# Patient Record
Sex: FEMALE | Race: WHITE | NOT HISPANIC OR LATINO | Employment: OTHER | URBAN - METROPOLITAN AREA
[De-identification: names, ages, dates, MRNs, and addresses within clinical notes are randomized per-mention and may not be internally consistent; named-entity substitution may affect disease eponyms.]

---

## 2017-01-12 ENCOUNTER — HOSPITAL ENCOUNTER (OUTPATIENT)
Dept: RADIOLOGY | Facility: MEDICAL CENTER | Age: 57
End: 2017-01-12
Attending: PHYSICIAN ASSISTANT
Payer: MEDICAID

## 2017-01-12 DIAGNOSIS — I85.00 ESOPHAGEAL VARICES WITHOUT MENTION OF BLEEDING: ICD-10-CM

## 2017-01-12 DIAGNOSIS — K70.30 ALCOHOLIC CIRRHOSIS OF LIVER WITHOUT ASCITES (HCC): ICD-10-CM

## 2017-01-12 DIAGNOSIS — B18.2 CHRONIC HEPATITIS C WITH HEPATIC COMA (HCC): ICD-10-CM

## 2017-01-12 PROCEDURE — 76705 ECHO EXAM OF ABDOMEN: CPT

## 2017-03-05 ENCOUNTER — APPOINTMENT (OUTPATIENT)
Dept: RADIOLOGY | Facility: MEDICAL CENTER | Age: 57
End: 2017-03-05
Attending: EMERGENCY MEDICINE
Payer: MEDICAID

## 2017-03-05 ENCOUNTER — HOSPITAL ENCOUNTER (EMERGENCY)
Facility: MEDICAL CENTER | Age: 57
End: 2017-03-06
Attending: EMERGENCY MEDICINE
Payer: MEDICAID

## 2017-03-05 VITALS
DIASTOLIC BLOOD PRESSURE: 68 MMHG | SYSTOLIC BLOOD PRESSURE: 135 MMHG | BODY MASS INDEX: 31.67 KG/M2 | RESPIRATION RATE: 18 BRPM | OXYGEN SATURATION: 98 % | WEIGHT: 209 LBS | HEART RATE: 68 BPM | HEIGHT: 68 IN | TEMPERATURE: 98 F

## 2017-03-05 DIAGNOSIS — S86.911A KNEE STRAIN, RIGHT, INITIAL ENCOUNTER: ICD-10-CM

## 2017-03-05 DIAGNOSIS — S86.912A STRAIN OF KNEE, LEFT, INITIAL ENCOUNTER: ICD-10-CM

## 2017-03-05 PROCEDURE — 99284 EMERGENCY DEPT VISIT MOD MDM: CPT

## 2017-03-05 PROCEDURE — 73700 CT LOWER EXTREMITY W/O DYE: CPT | Mod: RT

## 2017-03-05 PROCEDURE — 73564 X-RAY EXAM KNEE 4 OR MORE: CPT | Mod: RT

## 2017-03-05 PROCEDURE — 700102 HCHG RX REV CODE 250 W/ 637 OVERRIDE(OP): Performed by: EMERGENCY MEDICINE

## 2017-03-05 PROCEDURE — 73564 X-RAY EXAM KNEE 4 OR MORE: CPT | Mod: LT

## 2017-03-05 PROCEDURE — A9270 NON-COVERED ITEM OR SERVICE: HCPCS | Performed by: EMERGENCY MEDICINE

## 2017-03-05 RX ORDER — HYDROCODONE BITARTRATE AND ACETAMINOPHEN 5; 325 MG/1; MG/1
1 TABLET ORAL ONCE
Status: COMPLETED | OUTPATIENT
Start: 2017-03-05 | End: 2017-03-05

## 2017-03-05 RX ORDER — HYDROCODONE BITARTRATE AND ACETAMINOPHEN 5; 325 MG/1; MG/1
2 TABLET ORAL ONCE
Status: COMPLETED | OUTPATIENT
Start: 2017-03-06 | End: 2017-03-06

## 2017-03-05 RX ADMIN — HYDROCODONE BITARTRATE AND ACETAMINOPHEN 1 TABLET: 5; 325 TABLET ORAL at 20:58

## 2017-03-05 ASSESSMENT — PAIN SCALES - GENERAL: PAINLEVEL_OUTOF10: 10

## 2017-03-05 NOTE — ED AVS SNAPSHOT
Instabug Access Code: Activation code not generated  Current Instabug Status: Patient Declined    Your email address is not on file at [facility].  Email Addresses are required for you to sign up for Instabug, please contact [phone] to verify your personal information and to provide your email address prior to attempting to register for Instabug.    April Alicia  [address] [place] [address]    Instabug  A secure, online tool to manage your health information     [facility]’s Instabug® is a secure, online tool that connects you to your personalized health information from the privacy of your home -- day or night - making it very easy for you to manage your healthcare. Once the activation process is completed, you can even access your medical information using the Instabug luz elena, which is available for free in the Apple Luz Elena store or Google Play store.     To learn more about Instabug, visit [email or web address]    There are two levels of access available (as shown below):   My Chart Features  [facility] Primary Care Doctor [facility]  Specialists [facility]  Urgent  Care Non-[facility] Primary Care Doctor   Email your healthcare team securely and privately 24/7 X X X    Manage appointments: schedule your next appointment; view details of past/upcoming appointments X      Request prescription refills. X      View recent personal medical records, including lab and immunizations X X X X   View health record, including health history, allergies, medications X X X X   Read reports about your outpatient visits, procedures, consult and ER notes X X X X   See your discharge summary, which is a recap of your hospital and/or ER visit that includes your diagnosis, lab results, and care plan X X  X     How to register for Contentfult:  Once your e-mail address has been verified, follow the following steps to sign up for Contentfult.     1. Go to  [email or web address]  2. Click on the Sign Up Now box, which takes you to the  New Member Sign Up page. You will need to provide the following information:  a. Enter your Lema21 Access Code exactly as it appears at the top of this page. (You will not need to use this code after you’ve completed the sign-up process. If you do not sign up before the expiration date, you must request a new code.)   b. Enter your date of birth.   c. Enter your home email address.   d. Click Submit, and follow the next screen’s instructions.  3. Create a Lema21 ID. This will be your Lema21 login ID and cannot be changed, so think of one that is secure and easy to remember.  4. Create a Lema21 password. You can change your password at any time.  5. Enter your Password Reset Question and Answer. This can be used at a later time if you forget your password.   6. Enter your e-mail address. This allows you to receive e-mail notifications when new information is available in Lema21.  7. Click Sign Up. You can now view your health information.    For assistance activating your Lema21 account, call [phone]

## 2017-03-05 NOTE — ED AVS SNAPSHOT
Home Care Instructions                                                                                                                April Alicia   MRN: [id number]    Department:  [facility], Emergency Dept   Date of Visit:  3/5/2017            [facility], Emergency Dept    [address]    [place] [address]    Phone:  [phone]      You were seen by     Damian Bloom M.D.      Your Diagnosis Was     Strain of knee, left, initial encounter     S86.942A       These are the medications you received during your hospitalization from 03/05/2017 1912 to 03/06/2017 0019     Date/Time Order Dose Route Action    03/05/2017 2058 hydrocodone-acetaminophen (NORCO) 5-325 MG per tablet 1 Tab 1 Tab Oral Given      Follow-up Information     1. Follow up with Damian Earl M.D.. Call in 1 day.    Specialty:  Orthopaedics    Contact information    [address]  [address]  [facility] [address] [phone]        Medication Information     Review all of your home medications and newly ordered medications with your primary doctor and/or pharmacist as soon as possible. Follow medication instructions as directed by your doctor and/or pharmacist.     Please keep your complete medication list with you and share with your physician. Update the information when medications are discontinued, doses are changed, or new medications (including over-the-counter products) are added; and carry medication information at all times in the event of emergency situations.               Medication List      START taking these medications        Instructions    hydrocodone-acetaminophen 5-325 MG Tabs per tablet   Commonly known as:  NORCO    Take 1 Tab by mouth every four hours as needed.   Dose:  1 Tab         ASK your doctor about these medications        Instructions    albuterol 108 (90 BASE) MCG/ACT Aers inhalation aerosol    Inhale 2 Puffs by mouth every 6 hours as needed for  Shortness of Breath.   Dose:  2 Puff       AMBIEN 10 MG Tabs   Generic drug:  zolpidem    Take 10 mg by mouth at bedtime as needed for Sleep.   Dose:  10 mg       ascorbic acid 500 MG Tabs   Commonly known as:  ascorbic acid    Take 500 mg by mouth every day.   Dose:  500 mg       cetirizine 10 MG Tabs   Commonly known as:  ZYRTEC    Take 10 mg by mouth every day.   Dose:  10 mg       felodipine 10 MG Tb24   Commonly known as:  PLENDIL    Take 10 mg by mouth every day.   Dose:  10 mg       fish oil 1000 MG Caps capsule    Take 1,000 mg by mouth every day.   Dose:  1000 mg       fluticasone 50 MCG/ACT nasal spray   Commonly known as:  FLONASE    Spray 1 Spray in nose every day.   Dose:  1 Spray       gabapentin 300 MG Caps   Commonly known as:  NEURONTIN    Take 300 mg by mouth 3 times a day.   Dose:  300 mg       hydrochlorothiazide 25 MG Tabs   Commonly known as:  HYDRODIURIL    Take 25 mg by mouth every day.   Dose:  25 mg       IRON PO    Take 1 Tab by mouth 2 Times a Day.   Dose:  1 Tab       levofloxacin 500 MG tablet   Commonly known as:  LEVAQUIN    Take 500 mg by mouth every day. 10 day course.   Dose:  500 mg       lidocaine-prilocaine 2.5-2.5 % Crea   Commonly known as:  EMLA    Apply 1 Each to affected area(s) as needed (Knee pain).   Dose:  1 Each       losartan 100 MG Tabs   Commonly known as:  COZAAR    Take 100 mg by mouth every day.   Dose:  100 mg       magnesium oxide 400 MG Tabs   Commonly known as:  MAG-OX    Take 400 mg by mouth every day.   Dose:  400 mg       metformin 1000 MG tablet   Commonly known as:  GLUCOPHAGE    Take 1,000 mg by mouth 2 times a day, with meals.   Dose:  1000 mg       MULTI-VITAMIN PO    Take 1 Tab by mouth every day.   Dose:  1 Tab       omeprazole 10 MG Cpdr   Commonly known as:  PRILOSEC    Take 10 mg by mouth 2 Times a Day.   Dose:  10 mg       ondansetron 4 MG Tbdp   Commonly known as:  ZOFRAN ODT    Take 4 mg by mouth every 8 hours as needed for Nausea/Vomiting.      Dose:  4 mg       oxycodone-acetaminophen  MG Tabs   Commonly known as:  PERCOCET-10    Take 1-2 Tabs by mouth every 6 hours as needed for Severe Pain.   Dose:  1-2 Tab       OYST-DAILY PO    Take 1 Tab by mouth every day.   Dose:  1 Tab       potassium chloride SA 10 MEQ Tbcr   Commonly known as:  K-DUR    Take 10 mEq by mouth 2 times a day.   Dose:  10 mEq       VOLTAREN 1 % Gel   Generic drug:  Diclofenac Sodium    Apply 1 Each to skin as directed 2 times a day as needed (Knee pain).   Dose:  1 Each               Procedures and tests performed during your visit     CT-KNEE W/O PLUS RECONS RIGHT    DX-KNEE COMPLETE 4+ LEFT    DX-KNEE COMPLETE 4+ RIGHT        Discharge Instructions       Joint Sprain  A sprain is a tear or stretch in the ligaments that hold a joint together. Severe sprains may need as long as 3-6 weeks of immobilization and/or exercises to heal completely. Sprained joints should be rested and protected. If not, they can become unstable and prone to re-injury. Proper treatment can reduce your pain, shorten the period of disability, and reduce the risk of repeated injuries.  TREATMENT   · Rest and elevate the injured joint to reduce pain and swelling.  · Apply ice packs to the injury for 20-30 minutes every 2-3 hours for the next 2-3 days.  · Keep the injury wrapped in a compression bandage or splint as long as the joint is painful or as instructed by your caregiver.  · Do not use the injured joint until it is completely healed to prevent re-injury and chronic instability. Follow the instructions of your caregiver.  · Long-term sprain management may require exercises and/or treatment by a physical therapist. Taping or special braces may help stabilize the joint until it is completely better.  SEEK MEDICAL CARE IF:   · You develop increased pain or swelling of the joint.  · You develop increasing redness and warmth of the joint.  · You develop a fever.  · It becomes stiff.  · Your hand or foot  gets cold or numb.  Document Released: 01/25/2006 Document Revised: 03/11/2013 Document Reviewed: 01/04/2010  ExitCare® Patient Information ©2014 [employer].            Patient Information     Patient Information    Following emergency treatment: all patient requiring follow-up care must return either to a private physician or a clinic if your condition worsens before you are able to obtain further medical attention, please return to the emergency room.     Billing Information    At [facility], we work to make the billing process streamlined for our patients.  Our Representatives are here to answer any questions you may have regarding your hospital bill.  If you have insurance coverage and have supplied your insurance information to us, we will submit a claim to your insurer on your behalf.  Should you have any questions regarding your bill, we can be reached online or by phone as follows:  Online: You are able pay your bills online or live chat with our representatives about any billing questions you may have. We are here to help Monday - Friday from 8:00am to 7:30pm and 9:00am - 12:00pm on Saturdays.  Please visit [email or web address]  for more information.   Phone:  [phone] or [phone]    Please note that your emergency physician, surgeon, pathologist, radiologist, anesthesiologist, and other specialists are not employed by [facility] and will therefore bill separately for their services.  Please contact them directly for any questions concerning their bills at the numbers below:     Emergency Physician Services:  [phone]  [facility]:  [phone]  [facility]:  [phone]  [facility]:  [phone]    1. Your final bill may vary from the amount quoted upon discharge if all procedures are not complete at that time, or if your doctor has additional procedures of which we are not aware. You will receive an  additional bill if you return to the Emergency Department at [facility] for suture removal regardless of the facility of which the sutures were placed.     2. Please arrange for settlement of this account at the emergency registration.    3. All self-pay accounts are due in full at the time of treatment.  If you are unable to meet this obligation then payment is expected within 4-5 days.     4. If you have had radiology studies (CT, X-ray, Ultrasound, MRI), you have received a preliminary result during your emergency department visit. Please contact the radiology department [phone] to receive a copy of your final result. Please discuss the Final result with your primary physician or with the follow up physician provided.     Crisis Hotline:  [place] Crisis Hotline:  4-061-FKMBERX or 1-917.805.7356  [place] Crisis Hotline:    1-494.555.3369 or 366-603-0162         ED Discharge Follow Up Questions    1. In order to provide you with very good care, we would like to follow up with a phone call in the next few days.  May we have your permission to contact you?     YES /  NO    2. What is the best phone number to call you? (       )_____-__________    3. What is the best time to call you?      Morning  /  Afternoon  /  Evening                   Patient Signature:  ____________________________________________________________    Date:  ____________________________________________________________

## 2017-03-05 NOTE — ED AVS SNAPSHOT
3/6/2017          April Alicia  [address] [facility] [address]    Dear April:    [facility] wants to ensure your discharge home is safe and you or your loved ones have had all your questions answered regarding your care after you leave the hospital.    You may receive a telephone call within two days of your discharge.  This call is to make certain you understand your discharge instructions as well as ensure we provided you with the best care possible during your stay with us.     The call will only last approximately 3-5 minutes and will be done by a nurse.    Once again, we want to ensure your discharge home is safe and that you have a clear understanding of any next steps in your care.  If you have any questions or concerns, please do not hesitate to contact us, we are here for you.  Thank you for choosing [facility] for your healthcare needs.    Sincerely,    Clemente Young    [facility]

## 2017-03-06 PROCEDURE — 700102 HCHG RX REV CODE 250 W/ 637 OVERRIDE(OP): Performed by: EMERGENCY MEDICINE

## 2017-03-06 PROCEDURE — A9270 NON-COVERED ITEM OR SERVICE: HCPCS | Performed by: EMERGENCY MEDICINE

## 2017-03-06 RX ORDER — HYDROCODONE BITARTRATE AND ACETAMINOPHEN 5; 325 MG/1; MG/1
1 TABLET ORAL EVERY 4 HOURS PRN
Qty: 16 TAB | Refills: 0 | Status: SHIPPED | OUTPATIENT
Start: 2017-03-05 | End: 2017-09-03

## 2017-03-06 RX ADMIN — HYDROCODONE BITARTRATE AND ACETAMINOPHEN 2 TABLET: 5; 325 TABLET ORAL at 00:22

## 2017-03-06 NOTE — ED NOTES
April Sargent [facility]  Chief Complaint   Patient presents with   • Knee Pain     (B) L>R,  increasing over last 2 days after slipping on ice.     • GLF     2 days ago     Pt ambulatory to triage with cane for above complaint. VSS.  Pt returned to lobby, educated on triage process, and to inform staff of any changes or concerns.

## 2017-03-06 NOTE — ED PROVIDER NOTES
ED Provider Note    HPI: Patient is a 56-year-old female who presented to the emergency department March 5, 2017 at 7:12 PM with chief complaint of bilateral knee pain.    Patient fell on the ice couple of days ago landing on her knees. She has pain in the anterior aspect of both knees left greater than right. She denied any hip or ankle pain. No numbness or tingling of her lower extremities. No other somatic complaints. She does note that she felt a pop in the left knee when she fell.    Review of Systems: Positive for bilateral knee pain negative hip or ankle pain negative for numbness tingling of her extremities.    Past medical/surgical history: Heart valve disease liver disease diabetes seizure disorder hypertension hysterectomy    Medications: Felodipine Glucophage Neurontin Cozaar potassium chloride Zyrtec    Allergies: None    Social History: Previous history smoking no alcohol use      Physical exam: Constitutional: Pleasant female awake and alert  Vital signs: Temperature 98.0 pulse 68 respirations 18 blood pressure 135/68 pulse oximetry 98% on room air  Musculoskeletal: Pain with palpation lateral aspect both knees. I cannot palpate an effusion but the exam is problematic due to the patient's body habitus. No pain to palpation of hip or ankle bilaterally.  Neurologic: alert and awake answers questions appropriately. Decreased range of motion both lower extremities due to pain in the knee area. She is able to flex and extend her ankles bilaterally with no difficulty.  Skin: no rash or lesion seen, no palpable dermatologic lesions identified.  Psychiatric: not anxious, delusional, or hallucinating.    Medical decision making:  Patient given a Norco tablet for pain    Bilateral knee x-rays obtained; severe arthritic changes are noted. The radiologist was concerned there might be a tibial plateau fracture present on the right and suggested CT imaging be obtained. This was done and no fracture was  seen.    Patient placed in the left knee immobilizer. Patient discharged on Norco (drug database reviewed, no evidence of inappropriate utilization) patient is referred to follow-up with Dr. Earl our orthopedist on call. Patient is carefully counseled to call the orthopedist tomorrow. I also explained as well as encouraging her imaging shows no evidence of acute fracture this does not rule out a ligamentous or cartilaginous injury and orthopedic follow-up is indicated. Additionally there is always a small but real risk of occult fracture    Patient verbalized understanding of the above instructions and states she will comply    Impression #1) left knee strain  2) right knee strain

## 2017-03-06 NOTE — ED NOTES
D/c instructions and rx given to pt. Pt verbalizes she understands d/c instructions and denies further questions, needs, or concerns. Encouraged follow up and to return to ER if symptoms return or worsen. Pt resp even unlabored, skin pink warm dry, alert and oriented x3, ambulates with steady gait using cane.

## 2017-03-06 NOTE — DISCHARGE INSTRUCTIONS
Joint Sprain  A sprain is a tear or stretch in the ligaments that hold a joint together. Severe sprains may need as long as 3-6 weeks of immobilization and/or exercises to heal completely. Sprained joints should be rested and protected. If not, they can become unstable and prone to re-injury. Proper treatment can reduce your pain, shorten the period of disability, and reduce the risk of repeated injuries.  TREATMENT   · Rest and elevate the injured joint to reduce pain and swelling.  · Apply ice packs to the injury for 20-30 minutes every 2-3 hours for the next 2-3 days.  · Keep the injury wrapped in a compression bandage or splint as long as the joint is painful or as instructed by your caregiver.  · Do not use the injured joint until it is completely healed to prevent re-injury and chronic instability. Follow the instructions of your caregiver.  · Long-term sprain management may require exercises and/or treatment by a physical therapist. Taping or special braces may help stabilize the joint until it is completely better.  SEEK MEDICAL CARE IF:   · You develop increased pain or swelling of the joint.  · You develop increasing redness and warmth of the joint.  · You develop a fever.  · It becomes stiff.  · Your hand or foot gets cold or numb.  Document Released: 01/25/2006 Document Revised: 03/11/2013 Document Reviewed: 01/04/2010  Ratio® Patient Information ©2014 [employer].

## 2017-05-11 ENCOUNTER — APPOINTMENT (OUTPATIENT)
Dept: NEUROLOGY | Facility: MEDICAL CENTER | Age: 57
End: 2017-05-11
Payer: MEDICAID

## 2017-05-31 ENCOUNTER — APPOINTMENT (OUTPATIENT)
Dept: RADIOLOGY | Facility: MEDICAL CENTER | Age: 57
End: 2017-05-31
Attending: FAMILY MEDICINE
Payer: MEDICAID

## 2017-05-31 DIAGNOSIS — R22.32 MASS OF LEFT FOREARM: ICD-10-CM

## 2017-05-31 PROCEDURE — 700117 HCHG RX CONTRAST REV CODE 255: Performed by: FAMILY MEDICINE

## 2017-05-31 PROCEDURE — 73220 MRI UPPR EXTREMITY W/O&W/DYE: CPT | Mod: LT

## 2017-05-31 PROCEDURE — A9579 GAD-BASE MR CONTRAST NOS,1ML: HCPCS | Performed by: FAMILY MEDICINE

## 2017-05-31 RX ADMIN — GADODIAMIDE 19 ML: 287 INJECTION INTRAVENOUS at 16:56

## 2017-07-24 ENCOUNTER — APPOINTMENT (OUTPATIENT)
Dept: RADIOLOGY | Facility: MEDICAL CENTER | Age: 57
End: 2017-07-24
Attending: PHYSICIAN ASSISTANT
Payer: MEDICAID

## 2017-07-25 ENCOUNTER — HOSPITAL ENCOUNTER (OUTPATIENT)
Dept: RADIOLOGY | Facility: MEDICAL CENTER | Age: 57
End: 2017-07-25
Attending: PHYSICIAN ASSISTANT
Payer: MEDICAID

## 2017-07-25 DIAGNOSIS — B18.2 CHRONIC HEPATITIS C WITH HEPATIC COMA (HCC): ICD-10-CM

## 2017-07-25 PROCEDURE — 76705 ECHO EXAM OF ABDOMEN: CPT

## 2017-08-15 ENCOUNTER — OFFICE VISIT (OUTPATIENT)
Dept: NEUROLOGY | Facility: MEDICAL CENTER | Age: 57
End: 2017-08-15
Payer: MEDICAID

## 2017-08-15 VITALS
SYSTOLIC BLOOD PRESSURE: 110 MMHG | OXYGEN SATURATION: 93 % | BODY MASS INDEX: 30.16 KG/M2 | RESPIRATION RATE: 16 BRPM | TEMPERATURE: 98.1 F | HEIGHT: 68 IN | HEART RATE: 61 BPM | WEIGHT: 199 LBS | DIASTOLIC BLOOD PRESSURE: 78 MMHG

## 2017-08-15 DIAGNOSIS — G43.109 MIGRAINE WITH AURA AND WITHOUT STATUS MIGRAINOSUS, NOT INTRACTABLE: ICD-10-CM

## 2017-08-15 DIAGNOSIS — G44.40 MEDICATION OVERUSE HEADACHE: ICD-10-CM

## 2017-08-15 PROCEDURE — 99204 OFFICE O/P NEW MOD 45 MIN: CPT | Performed by: PHYSICIAN ASSISTANT

## 2017-08-15 RX ORDER — ONDANSETRON 8 MG/1
8 TABLET, ORALLY DISINTEGRATING ORAL EVERY 8 HOURS PRN
Qty: 15 TAB | Refills: 0 | Status: SHIPPED | OUTPATIENT
Start: 2017-08-15 | End: 2017-09-03

## 2017-08-15 RX ORDER — SUMATRIPTAN 50 MG/1
50 TABLET, FILM COATED ORAL
Qty: 10 TAB | Refills: 3 | Status: SHIPPED | OUTPATIENT
Start: 2017-08-15 | End: 2017-09-03

## 2017-08-15 RX ORDER — PANTOPRAZOLE SODIUM 40 MG/1
40 TABLET, DELAYED RELEASE ORAL DAILY
COMMUNITY
End: 2020-01-27

## 2017-08-15 RX ORDER — OXYCODONE HYDROCHLORIDE 10 MG/1
TABLET ORAL
Refills: 0 | COMMUNITY
Start: 2017-07-26 | End: 2017-09-03

## 2017-08-15 ASSESSMENT — PATIENT HEALTH QUESTIONNAIRE - PHQ9
SUM OF ALL RESPONSES TO PHQ QUESTIONS 1-9: 13
CLINICAL INTERPRETATION OF PHQ2 SCORE: 1
5. POOR APPETITE OR OVEREATING: 2 - MORE THAN HALF THE DAYS

## 2017-08-15 NOTE — PATIENT INSTRUCTIONS
Plan: Daily headache/migraine with aura    Acute/Rescue Medications: max use 9 days monthly - use calendar to track and bring to next visit    Triptan - sumatriptan 50 mg - begin with just 1/2 tablet the first.  Take at onset of bad headache.   Nausea medication - zofran to use when you have bad nausea with the headache.  NSAID - OK to take tylenol or advil/aleve same day or even same time that you take the triptan.    Daily Preventative Treatment:  Daily medicine - to consider if daily dull headache doesn't improve once you are off narcotics for your knee    Other:  ONB and/or Trigger point injection - handout provided and available if you have a headache that doesn't go away    Additional Testing:  MRI - pending    Recommend moving up eye doctor appointment if possible.  Need dilated eye exam that is scheduled currently for October.    Return appointment in 3 months
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
## 2017-08-15 NOTE — MR AVS SNAPSHOT
"April Alicia   8/15/2017 10:20 AM   Office Visit   MRN: [id number]    Department:  [facility]   Dept Phone:  [phone]    Description:  Female : 1960   Provider:  Ondina Turpin PA-C           Reason for Visit     New Patient Headache      Allergies as of 8/15/2017     Allergen Noted Reactions    Nkda [No Known Drug Allergy] 09/10/2008         You were diagnosed with     Migraine with aura and without status migrainosus, not intractable   [[id number]]         Vital Signs     Blood Pressure Pulse Temperature Respirations Height Weight    110/78 mmHg 61 36.7 °C (98.1 °F) 16 1.715 m (5' 7.5\") 90.266 kg (199 lb)    Body Mass Index Oxygen Saturation Last Menstrual Period Smoking Status          30.69 kg/m2 93% 2008 Former Smoker        Basic Information     Date Of Birth Sex Race Ethnicity Preferred Language    1960 Female  or  Non- English      Your appointments     2017 10:20 AM   Follow Up Visit with Ondina Turpin PA-C   [facility] (--)    [address] [place] [address], [address]  [facility] [id number] [phone]           You will be receiving a confirmation call a few days before your appointment from our automated call confirmation system.              Problem List              ICD-10-CM Priority Class Noted - Resolved    BRBPR (bright red blood per rectum) K62.5   10/27/2015 - Present      Health Maintenance        Date Due Completion Dates    IMM HEP B VACCINE (1 of 3 - Primary Series) 1960 ---    IMM DTaP/Tdap/Td Vaccine (1 - Tdap) 1979 ---    PAP SMEAR 1981 ---    MAMMOGRAM 2015    IMM INFLUENZA (1) 2017 ---    COLONOSCOPY 10/28/2025 10/28/2015, 10/28/2015, 10/28/2015            Current Immunizations     No immunizations on file.      Below and/or attached are the medications your provider expects you to take. Review all of your home medications and newly ordered medications with your " provider and/or pharmacist. Follow medication instructions as directed by your provider and/or pharmacist. Please keep your medication list with you and share with your provider. Update the information when medications are discontinued, doses are changed, or new medications (including over-the-counter products) are added; and carry medication information at all times in the event of emergency situations     Allergies:  NKDA - (reactions not documented)               Medications  Valid as of: August 15, 2017 - 11:01 AM    Generic Name Brand Name Tablet Size Instructions for use    Albuterol Sulfate (Aero Soln) albuterol 108 (90 BASE) MCG/ACT Inhale 2 Puffs by mouth every 6 hours as needed for Shortness of Breath.        Ascorbic Acid (Tab) ascorbic acid 500 MG Take 500 mg by mouth every day.        Cetirizine HCl (Tab) ZYRTEC 10 MG Take 10 mg by mouth every day.        Diclofenac Sodium (Gel) Diclofenac Sodium 1 % Apply 1 Each to skin as directed 2 times a day as needed (Knee pain).        Docusate Sodium (Cap) COLACE 50 MG Take 50 mg by mouth 2 times a day.        Felodipine (TABLET SR 24 HR) PLENDIL 10 MG Take 10 mg by mouth every day.        Fluticasone Propionate (Suspension) FLONASE 50 MCG/ACT Spray 1 Spray in nose every day.        Gabapentin (Cap) NEURONTIN 300 MG Take 300 mg by mouth 3 times a day.        HydroCHLOROthiazide (Tab) HYDRODIURIL 25 MG Take 25 mg by mouth every day.        Hydrocodone-Acetaminophen (Tab) NORCO 5-325 MG Take 1 Tab by mouth every four hours as needed.        Iron   Take 1 Tab by mouth 2 Times a Day.        LevoFLOXacin (Tab) LEVAQUIN 500 MG Take 500 mg by mouth every day. 10 day course.        Lidocaine-Prilocaine (Cream) EMLA 2.5-2.5 % Apply 1 Each to affected area(s) as needed (Knee pain).        Losartan Potassium (Tab) COZAAR 100 MG Take 100 mg by mouth every day.        Magnesium Oxide (Tab) MAG- MG Take 400 mg by mouth every day.        MetFORMIN HCl (Tab) GLUCOPHAGE  1000 MG Take 1,000 mg by mouth 2 times a day, with meals.        Multiple Vitamin   Take 1 Tab by mouth every day.        Omega-3 Fatty Acids (Cap) fish oil 1000 MG Take 1,000 mg by mouth every day.        Omeprazole (CAPSULE DELAYED RELEASE) PRILOSEC 10 MG Take 10 mg by mouth 2 Times a Day.        Ondansetron (TABLET DISPERSIBLE) ZOFRAN ODT 4 MG Take 4 mg by mouth every 8 hours as needed for Nausea/Vomiting.        Ondansetron (TABLET DISPERSIBLE) ZOFRAN ODT 8 MG Take 1 Tab by mouth every 8 hours as needed for Nausea.        OxyCODONE HCl (Tab) ROXICODONE 10 MG TK 1 T PO TID PRN P        Oxycodone-Acetaminophen (Tab) PERCOCET-10  MG Take 1-2 Tabs by mouth every 6 hours as needed for Severe Pain.        Oyster Shell   Take 1 Tab by mouth every day.        Pantoprazole Sodium (Tablet Delayed Response) PROTONIX 40 MG Take 40 mg by mouth every day.        Potassium Chloride Jackelin CR (Tab CR) K-DUR 10 MEQ Take 10 mEq by mouth 2 times a day.        SUMAtriptan Succinate (Tab) IMITREX 50 MG Take 1 Tab by mouth Once PRN for Migraine for up to 1 dose.        Zolpidem Tartrate (Tab) AMBIEN 10 MG Take 10 mg by mouth at bedtime as needed for Sleep.        .                 Medicines prescribed today were sent to:     [facility] [address] [place] [address] [address] [facility] [address] [place]    [address] [facility] [address]    Phone: [phone] Fax: [phone]    Open 24 Hours?: Yes      Medication refill instructions:       If your prescription bottle indicates you have medication refills left, it is not necessary to call your provider’s office. Please contact your pharmacy and they will refill your medication.    If your prescription bottle indicates you do not have any refills left, you may request refills at any time through one of the following ways: The online [employer] system (except Urgent Care), by calling your provider’s office, or by asking your pharmacy to contact your provider’s office with  a refill request. Medication refills are processed only during regular business hours and may not be available until the next business day. Your provider may request additional information or to have a follow-up visit with you prior to refilling your medication.   *Please Note: Medication refills are assigned a new Rx number when refilled electronically. Your pharmacy may indicate that no refills were authorized even though a new prescription for the same medication is available at the pharmacy. Please request the medicine by name with the pharmacy before contacting your provider for a refill.        Instructions    Plan: Daily headache/migraine with aura    Acute/Rescue Medications: max use 9 days monthly - use calendar to track and bring to next visit    Triptan - sumatriptan 50 mg - begin with just 1/2 tablet the first.  Take at onset of bad headache.   Nausea medication - zofran to use when you have bad nausea with the headache.  NSAID - OK to take tylenol or advil/aleve same day or even same time that you take the triptan.    Daily Preventative Treatment:  Daily medicine - to consider if daily dull headache doesn't improve once you are off narcotics for your knee    Other:  ONB and/or Trigger point injection - handout provided and available if you have a headache that doesn't go away    Additional Testing:  MRI - pending    Recommend moving up eye doctor appointment if possible.  Need dilated eye exam that is scheduled currently for October.    Return appointment in 3 months              [facility] Status: Patient Declined

## 2017-09-03 ENCOUNTER — HOSPITAL ENCOUNTER (INPATIENT)
Facility: MEDICAL CENTER | Age: 57
LOS: 8 days | DRG: 464 | End: 2017-09-12
Attending: EMERGENCY MEDICINE | Admitting: INTERNAL MEDICINE
Payer: MEDICAID

## 2017-09-03 ENCOUNTER — APPOINTMENT (OUTPATIENT)
Dept: RADIOLOGY | Facility: MEDICAL CENTER | Age: 57
DRG: 464 | End: 2017-09-03
Attending: EMERGENCY MEDICINE
Payer: MEDICAID

## 2017-09-03 DIAGNOSIS — B99.9 INFECTION: ICD-10-CM

## 2017-09-03 DIAGNOSIS — T84.59XD INFECTED PROSTHETIC KNEE JOINT, SUBSEQUENT ENCOUNTER: ICD-10-CM

## 2017-09-03 DIAGNOSIS — Z96.659 INFECTED PROSTHETIC KNEE JOINT, SUBSEQUENT ENCOUNTER: ICD-10-CM

## 2017-09-03 DIAGNOSIS — M00.9 PYOGENIC ARTHRITIS OF RIGHT KNEE JOINT, DUE TO UNSPECIFIED ORGANISM (HCC): ICD-10-CM

## 2017-09-03 LAB
ALBUMIN SERPL BCP-MCNC: 3.5 G/DL (ref 3.2–4.9)
ALBUMIN/GLOB SERPL: 0.9 G/DL
ALP SERPL-CCNC: 79 U/L (ref 30–99)
ALT SERPL-CCNC: 11 U/L (ref 2–50)
ANION GAP SERPL CALC-SCNC: 9 MMOL/L (ref 0–11.9)
APPEARANCE FLD: NORMAL
AST SERPL-CCNC: 20 U/L (ref 12–45)
BASOPHILS # BLD AUTO: 0.9 % (ref 0–1.8)
BASOPHILS # BLD: 0.04 K/UL (ref 0–0.12)
BILIRUB SERPL-MCNC: 1.3 MG/DL (ref 0.1–1.5)
BODY FLD TYPE: NORMAL
BODY FLD TYPE: NORMAL
BUN SERPL-MCNC: 11 MG/DL (ref 8–22)
CALCIUM SERPL-MCNC: 9.3 MG/DL (ref 8.5–10.5)
CHLORIDE SERPL-SCNC: 105 MMOL/L (ref 96–112)
CO2 SERPL-SCNC: 22 MMOL/L (ref 20–33)
COLOR FLD: NORMAL
CREAT SERPL-MCNC: 0.68 MG/DL (ref 0.5–1.4)
CRP SERPL HS-MCNC: 1.35 MG/DL (ref 0–0.75)
CRP SERPL HS-MCNC: 14.2 MG/L (ref 0–7.5)
CRYSTALS FLD MICRO: NORMAL
EOSINOPHIL # BLD AUTO: 0.16 K/UL (ref 0–0.51)
EOSINOPHIL NFR BLD: 3.6 % (ref 0–6.9)
ERYTHROCYTE [DISTWIDTH] IN BLOOD BY AUTOMATED COUNT: 44.8 FL (ref 35.9–50)
ERYTHROCYTE [SEDIMENTATION RATE] IN BLOOD BY WESTERGREN METHOD: 62 MM/HOUR (ref 0–30)
GFR SERPL CREATININE-BSD FRML MDRD: >60 ML/MIN/1.73 M 2
GLOBULIN SER CALC-MCNC: 4 G/DL (ref 1.9–3.5)
GLUCOSE BLD-MCNC: 107 MG/DL (ref 65–99)
GLUCOSE BLD-MCNC: 99 MG/DL (ref 65–99)
GLUCOSE SERPL-MCNC: 102 MG/DL (ref 65–99)
GRAM STN SPEC: NORMAL
HCT VFR BLD AUTO: 30.9 % (ref 37–47)
HGB BLD-MCNC: 10.4 G/DL (ref 12–16)
IMM GRANULOCYTES # BLD AUTO: 0.01 K/UL (ref 0–0.11)
IMM GRANULOCYTES NFR BLD AUTO: 0.2 % (ref 0–0.9)
LYMPHOCYTES # BLD AUTO: 1.01 K/UL (ref 1–4.8)
LYMPHOCYTES NFR BLD: 23 % (ref 22–41)
LYMPHOCYTES NFR FLD: 1 %
MCH RBC QN AUTO: 29.7 PG (ref 27–33)
MCHC RBC AUTO-ENTMCNC: 33.7 G/DL (ref 33.6–35)
MCV RBC AUTO: 88.3 FL (ref 81.4–97.8)
MONOCYTES # BLD AUTO: 0.31 K/UL (ref 0–0.85)
MONOCYTES NFR BLD AUTO: 7.1 % (ref 0–13.4)
NEUTROPHILS # BLD AUTO: 2.86 K/UL (ref 2–7.15)
NEUTROPHILS NFR BLD: 65.2 % (ref 44–72)
NEUTROPHILS NFR FLD: 99 %
NRBC # BLD AUTO: 0 K/UL
NRBC BLD AUTO-RTO: 0 /100 WBC
PLATELET # BLD AUTO: 185 K/UL (ref 164–446)
PMV BLD AUTO: 9.4 FL (ref 9–12.9)
POTASSIUM SERPL-SCNC: 3.8 MMOL/L (ref 3.6–5.5)
PROT SERPL-MCNC: 7.5 G/DL (ref 6–8.2)
RBC # BLD AUTO: 3.5 M/UL (ref 4.2–5.4)
RBC # FLD: NORMAL CELLS/UL
SIGNIFICANT IND 70042: NORMAL
SITE SITE: NORMAL
SODIUM SERPL-SCNC: 136 MMOL/L (ref 135–145)
SOURCE SOURCE: NORMAL
WBC # BLD AUTO: 4.4 K/UL (ref 4.8–10.8)
WBC # FLD: NORMAL CELLS/UL

## 2017-09-03 PROCEDURE — 85025 COMPLETE CBC W/AUTO DIFF WBC: CPT

## 2017-09-03 PROCEDURE — 500122 HCHG BOVIE, BLADE: Performed by: ORTHOPAEDIC SURGERY

## 2017-09-03 PROCEDURE — 87205 SMEAR GRAM STAIN: CPT

## 2017-09-03 PROCEDURE — 500367 HCHG DRAIN KIT, HEMOVAC: Performed by: ORTHOPAEDIC SURGERY

## 2017-09-03 PROCEDURE — 80053 COMPREHEN METABOLIC PANEL: CPT

## 2017-09-03 PROCEDURE — 700105 HCHG RX REV CODE 258: Performed by: EMERGENCY MEDICINE

## 2017-09-03 PROCEDURE — 99291 CRITICAL CARE FIRST HOUR: CPT

## 2017-09-03 PROCEDURE — 502000 HCHG MISC OR IMPLANTS RC 0278: Performed by: ORTHOPAEDIC SURGERY

## 2017-09-03 PROCEDURE — 501486 HCHG STRYKER IRRIG SET HC W/TUBING: Performed by: ORTHOPAEDIC SURGERY

## 2017-09-03 PROCEDURE — 87070 CULTURE OTHR SPECIMN AEROBIC: CPT

## 2017-09-03 PROCEDURE — 87015 SPECIMEN INFECT AGNT CONCNTJ: CPT

## 2017-09-03 PROCEDURE — 500054 HCHG BANDAGE, ELASTIC 6: Performed by: ORTHOPAEDIC SURGERY

## 2017-09-03 PROCEDURE — 160002 HCHG RECOVERY MINUTES (STAT): Performed by: ORTHOPAEDIC SURGERY

## 2017-09-03 PROCEDURE — 160035 HCHG PACU - 1ST 60 MINS PHASE I: Performed by: ORTHOPAEDIC SURGERY

## 2017-09-03 PROCEDURE — 700111 HCHG RX REV CODE 636 W/ 250 OVERRIDE (IP)

## 2017-09-03 PROCEDURE — 82962 GLUCOSE BLOOD TEST: CPT

## 2017-09-03 PROCEDURE — A6454 SELF-ADHER BAND W>=3" <5"/YD: HCPCS | Performed by: ORTHOPAEDIC SURGERY

## 2017-09-03 PROCEDURE — 160048 HCHG OR STATISTICAL LEVEL 1-5: Performed by: ORTHOPAEDIC SURGERY

## 2017-09-03 PROCEDURE — 86141 C-REACTIVE PROTEIN HS: CPT

## 2017-09-03 PROCEDURE — 501445 HCHG STAPLER, SKIN DISP: Performed by: ORTHOPAEDIC SURGERY

## 2017-09-03 PROCEDURE — A6222 GAUZE <=16 IN NO W/SAL W/O B: HCPCS | Performed by: ORTHOPAEDIC SURGERY

## 2017-09-03 PROCEDURE — A6223 GAUZE >16<=48 NO W/SAL W/O B: HCPCS | Performed by: ORTHOPAEDIC SURGERY

## 2017-09-03 PROCEDURE — 73562 X-RAY EXAM OF KNEE 3: CPT | Mod: RT

## 2017-09-03 PROCEDURE — 160009 HCHG ANES TIME/MIN: Performed by: ORTHOPAEDIC SURGERY

## 2017-09-03 PROCEDURE — 160027 HCHG SURGERY MINUTES - 1ST 30 MINS LEVEL 2: Performed by: ORTHOPAEDIC SURGERY

## 2017-09-03 PROCEDURE — 700101 HCHG RX REV CODE 250

## 2017-09-03 PROCEDURE — 89051 BODY FLUID CELL COUNT: CPT

## 2017-09-03 PROCEDURE — 500881 HCHG PACK, EXTREMITY: Performed by: ORTHOPAEDIC SURGERY

## 2017-09-03 PROCEDURE — 160038 HCHG SURGERY MINUTES - EA ADDL 1 MIN LEVEL 2: Performed by: ORTHOPAEDIC SURGERY

## 2017-09-03 PROCEDURE — 87077 CULTURE AEROBIC IDENTIFY: CPT

## 2017-09-03 PROCEDURE — 160036 HCHG PACU - EA ADDL 30 MINS PHASE I: Performed by: ORTHOPAEDIC SURGERY

## 2017-09-03 PROCEDURE — 89060 EXAM SYNOVIAL FLUID CRYSTALS: CPT

## 2017-09-03 PROCEDURE — 85652 RBC SED RATE AUTOMATED: CPT

## 2017-09-03 PROCEDURE — 87075 CULTR BACTERIA EXCEPT BLOOD: CPT | Mod: 91

## 2017-09-03 PROCEDURE — 501487 HCHG STRYKER TIP: Performed by: ORTHOPAEDIC SURGERY

## 2017-09-03 PROCEDURE — 87186 SC STD MICRODIL/AGAR DIL: CPT

## 2017-09-03 PROCEDURE — 700111 HCHG RX REV CODE 636 W/ 250 OVERRIDE (IP): Performed by: ORTHOPAEDIC SURGERY

## 2017-09-03 PROCEDURE — 86140 C-REACTIVE PROTEIN: CPT

## 2017-09-03 PROCEDURE — 87040 BLOOD CULTURE FOR BACTERIA: CPT

## 2017-09-03 PROCEDURE — 501838 HCHG SUTURE GENERAL: Performed by: ORTHOPAEDIC SURGERY

## 2017-09-03 PROCEDURE — 96374 THER/PROPH/DIAG INJ IV PUSH: CPT

## 2017-09-03 DEVICE — BEADS STIMULAN CALCIUM SULFATE: Type: IMPLANTABLE DEVICE | Status: FUNCTIONAL

## 2017-09-03 DEVICE — IMPLANTABLE DEVICE: Type: IMPLANTABLE DEVICE | Status: FUNCTIONAL

## 2017-09-03 RX ORDER — SODIUM CHLORIDE 9 MG/ML
1000 INJECTION, SOLUTION INTRAVENOUS ONCE
Status: COMPLETED | OUTPATIENT
Start: 2017-09-03 | End: 2017-09-03

## 2017-09-03 RX ORDER — MORPHINE SULFATE 4 MG/ML
2 INJECTION, SOLUTION INTRAMUSCULAR; INTRAVENOUS ONCE
Status: COMPLETED | OUTPATIENT
Start: 2017-09-03 | End: 2017-09-03

## 2017-09-03 RX ORDER — MAGNESIUM HYDROXIDE 1200 MG/15ML
LIQUID ORAL
Status: DISCONTINUED | OUTPATIENT
Start: 2017-09-03 | End: 2017-09-04 | Stop reason: HOSPADM

## 2017-09-03 RX ORDER — DOCUSATE SODIUM 100 MG/1
100 CAPSULE, LIQUID FILLED ORAL 2 TIMES DAILY
COMMUNITY
End: 2022-02-24

## 2017-09-03 RX ORDER — MIDAZOLAM HYDROCHLORIDE 1 MG/ML
INJECTION INTRAMUSCULAR; INTRAVENOUS
Status: DISPENSED
Start: 2017-09-03 | End: 2017-09-04

## 2017-09-03 RX ORDER — FERROUS SULFATE 325(65) MG
325 TABLET ORAL DAILY
COMMUNITY
End: 2018-06-03

## 2017-09-03 RX ORDER — TOBRAMYCIN 1.2 G/30ML
INJECTION, POWDER, LYOPHILIZED, FOR SOLUTION INTRAVENOUS
Status: DISCONTINUED | OUTPATIENT
Start: 2017-09-03 | End: 2017-09-04 | Stop reason: HOSPADM

## 2017-09-03 RX ORDER — ACETAMINOPHEN 650 MG
TABLET, EXTENDED RELEASE ORAL
Status: DISCONTINUED | OUTPATIENT
Start: 2017-09-03 | End: 2017-09-04 | Stop reason: HOSPADM

## 2017-09-03 RX ADMIN — MORPHINE SULFATE 2 MG: 4 INJECTION INTRAVENOUS at 19:54

## 2017-09-03 RX ADMIN — SODIUM CHLORIDE 1000 ML: 9 INJECTION, SOLUTION INTRAVENOUS at 22:12

## 2017-09-03 ASSESSMENT — PAIN SCALES - GENERAL: PAINLEVEL_OUTOF10: 10

## 2017-09-03 NOTE — ED NOTES
Chief Complaint   Patient presents with   • Post-Op Complications     had right knee replacement on 8/21. pt states her staples are due to come out tuesday. pt states she noticed pus, redness and drainage to site. pt worried about healing due to diabetes.

## 2017-09-04 ENCOUNTER — RESOLUTE PROFESSIONAL BILLING HOSPITAL PROF FEE (OUTPATIENT)
Dept: HOSPITALIST | Facility: MEDICAL CENTER | Age: 57
End: 2017-09-04
Payer: MEDICAID

## 2017-09-04 PROBLEM — D64.9 NORMOCYTIC ANEMIA: Status: ACTIVE | Noted: 2017-09-04

## 2017-09-04 PROBLEM — E11.42 DIABETIC POLYNEUROPATHY ASSOCIATED WITH TYPE 2 DIABETES MELLITUS (HCC): Status: ACTIVE | Noted: 2017-09-04

## 2017-09-04 PROBLEM — Z86.79 HISTORY OF RHEUMATIC FEVER: Status: ACTIVE | Noted: 2017-09-04

## 2017-09-04 PROBLEM — E11.49 TYPE 2 DIABETES MELLITUS WITH NEUROLOGIC COMPLICATION (HCC): Status: ACTIVE | Noted: 2017-09-04

## 2017-09-04 PROBLEM — B99.9 INFECTION: Status: ACTIVE | Noted: 2017-09-04

## 2017-09-04 PROBLEM — K70.30 CIRRHOSIS, ALCOHOLIC (HCC): Status: ACTIVE | Noted: 2017-09-04

## 2017-09-04 PROBLEM — Z86.79 HISTORY OF HYPERTENSION: Status: ACTIVE | Noted: 2017-09-04

## 2017-09-04 PROBLEM — T84.59XA INFECTED PROSTHETIC KNEE JOINT (HCC): Status: ACTIVE | Noted: 2017-09-04

## 2017-09-04 PROBLEM — Z96.659 INFECTED PROSTHETIC KNEE JOINT (HCC): Status: ACTIVE | Noted: 2017-09-04

## 2017-09-04 PROBLEM — R30.0 DYSURIA: Status: ACTIVE | Noted: 2017-09-04

## 2017-09-04 LAB
APPEARANCE UR: CLEAR
BILIRUB UR QL STRIP.AUTO: NEGATIVE
CHOLEST SERPL-MCNC: 95 MG/DL (ref 100–199)
COLOR UR: YELLOW
EKG IMPRESSION: NORMAL
EST. AVERAGE GLUCOSE BLD GHB EST-MCNC: 111 MG/DL
FERRITIN SERPL-MCNC: 133 NG/ML (ref 10–291)
FOLATE SERPL-MCNC: >23.7 NG/ML
GLUCOSE BLD-MCNC: 113 MG/DL (ref 65–99)
GLUCOSE BLD-MCNC: 114 MG/DL (ref 65–99)
GLUCOSE BLD-MCNC: 118 MG/DL (ref 65–99)
GLUCOSE BLD-MCNC: 152 MG/DL (ref 65–99)
GLUCOSE BLD-MCNC: 198 MG/DL (ref 65–99)
GLUCOSE UR STRIP.AUTO-MCNC: NEGATIVE MG/DL
GRAM STN SPEC: NORMAL
GRAM STN SPEC: NORMAL
HBA1C MFR BLD: 5.5 % (ref 0–5.6)
HDLC SERPL-MCNC: 29 MG/DL
INR PPP: 1.27 (ref 0.87–1.13)
IRON SATN MFR SERPL: 17 % (ref 15–55)
IRON SERPL-MCNC: 34 UG/DL (ref 40–170)
KETONES UR STRIP.AUTO-MCNC: NEGATIVE MG/DL
LDLC SERPL CALC-MCNC: 52 MG/DL
LEUKOCYTE ESTERASE UR QL STRIP.AUTO: NEGATIVE
LV EJECT FRACT MOD 2C 99903: 62.49
LV EJECT FRACT MOD 4C 99902: 61.43
LV EJECT FRACT MOD BP 99901: 60.57
MICRO URNS: NORMAL
NITRITE UR QL STRIP.AUTO: NEGATIVE
PH UR STRIP.AUTO: 5.5 [PH]
PROT UR QL STRIP: NEGATIVE MG/DL
PROTHROMBIN TIME: 16.3 SEC (ref 12–14.6)
RBC UR QL AUTO: NEGATIVE
SIGNIFICANT IND 70042: NORMAL
SIGNIFICANT IND 70042: NORMAL
SITE SITE: NORMAL
SITE SITE: NORMAL
SOURCE SOURCE: NORMAL
SOURCE SOURCE: NORMAL
SP GR UR STRIP.AUTO: 1.02
TIBC SERPL-MCNC: 197 UG/DL (ref 250–450)
TRIGL SERPL-MCNC: 69 MG/DL (ref 0–149)
UROBILINOGEN UR STRIP.AUTO-MCNC: 1 MG/DL
VIT B12 SERPL-MCNC: >1500 PG/ML (ref 211–911)

## 2017-09-04 PROCEDURE — 80061 LIPID PANEL: CPT

## 2017-09-04 PROCEDURE — 700102 HCHG RX REV CODE 250 W/ 637 OVERRIDE(OP): Performed by: INTERNAL MEDICINE

## 2017-09-04 PROCEDURE — G8979 MOBILITY GOAL STATUS: HCPCS | Mod: CI

## 2017-09-04 PROCEDURE — 97165 OT EVAL LOW COMPLEX 30 MIN: CPT

## 2017-09-04 PROCEDURE — 0SUC09Z SUPPLEMENT RIGHT KNEE JOINT WITH LINER, OPEN APPROACH: ICD-10-PCS | Performed by: ORTHOPAEDIC SURGERY

## 2017-09-04 PROCEDURE — A9270 NON-COVERED ITEM OR SERVICE: HCPCS | Performed by: STUDENT IN AN ORGANIZED HEALTH CARE EDUCATION/TRAINING PROGRAM

## 2017-09-04 PROCEDURE — 700111 HCHG RX REV CODE 636 W/ 250 OVERRIDE (IP)

## 2017-09-04 PROCEDURE — 87040 BLOOD CULTURE FOR BACTERIA: CPT

## 2017-09-04 PROCEDURE — 82728 ASSAY OF FERRITIN: CPT

## 2017-09-04 PROCEDURE — A9270 NON-COVERED ITEM OR SERVICE: HCPCS | Performed by: INTERNAL MEDICINE

## 2017-09-04 PROCEDURE — 82746 ASSAY OF FOLIC ACID SERUM: CPT

## 2017-09-04 PROCEDURE — G8978 MOBILITY CURRENT STATUS: HCPCS | Mod: CK

## 2017-09-04 PROCEDURE — 93306 TTE W/DOPPLER COMPLETE: CPT | Mod: 26 | Performed by: INTERNAL MEDICINE

## 2017-09-04 PROCEDURE — 0SPC09Z REMOVAL OF LINER FROM RIGHT KNEE JOINT, OPEN APPROACH: ICD-10-PCS | Performed by: ORTHOPAEDIC SURGERY

## 2017-09-04 PROCEDURE — 85610 PROTHROMBIN TIME: CPT

## 2017-09-04 PROCEDURE — 700102 HCHG RX REV CODE 250 W/ 637 OVERRIDE(OP): Performed by: STUDENT IN AN ORGANIZED HEALTH CARE EDUCATION/TRAINING PROGRAM

## 2017-09-04 PROCEDURE — A9270 NON-COVERED ITEM OR SERVICE: HCPCS | Performed by: ORTHOPAEDIC SURGERY

## 2017-09-04 PROCEDURE — 700102 HCHG RX REV CODE 250 W/ 637 OVERRIDE(OP)

## 2017-09-04 PROCEDURE — 82962 GLUCOSE BLOOD TEST: CPT

## 2017-09-04 PROCEDURE — 700105 HCHG RX REV CODE 258

## 2017-09-04 PROCEDURE — 700111 HCHG RX REV CODE 636 W/ 250 OVERRIDE (IP): Performed by: STUDENT IN AN ORGANIZED HEALTH CARE EDUCATION/TRAINING PROGRAM

## 2017-09-04 PROCEDURE — 93005 ELECTROCARDIOGRAM TRACING: CPT | Performed by: INTERNAL MEDICINE

## 2017-09-04 PROCEDURE — 81003 URINALYSIS AUTO W/O SCOPE: CPT

## 2017-09-04 PROCEDURE — A9270 NON-COVERED ITEM OR SERVICE: HCPCS

## 2017-09-04 PROCEDURE — 82607 VITAMIN B-12: CPT

## 2017-09-04 PROCEDURE — 3E0U029 INTRODUCTION OF OTHER ANTI-INFECTIVE INTO JOINTS, OPEN APPROACH: ICD-10-PCS | Performed by: ORTHOPAEDIC SURGERY

## 2017-09-04 PROCEDURE — 770001 HCHG ROOM/CARE - MED/SURG/GYN PRIV*

## 2017-09-04 PROCEDURE — 36415 COLL VENOUS BLD VENIPUNCTURE: CPT

## 2017-09-04 PROCEDURE — G8987 SELF CARE CURRENT STATUS: HCPCS | Mod: CJ

## 2017-09-04 PROCEDURE — 83540 ASSAY OF IRON: CPT

## 2017-09-04 PROCEDURE — 83036 HEMOGLOBIN GLYCOSYLATED A1C: CPT

## 2017-09-04 PROCEDURE — 700102 HCHG RX REV CODE 250 W/ 637 OVERRIDE(OP): Performed by: ORTHOPAEDIC SURGERY

## 2017-09-04 PROCEDURE — 97162 PT EVAL MOD COMPLEX 30 MIN: CPT

## 2017-09-04 PROCEDURE — 93010 ELECTROCARDIOGRAM REPORT: CPT | Performed by: INTERNAL MEDICINE

## 2017-09-04 PROCEDURE — G8988 SELF CARE GOAL STATUS: HCPCS | Mod: CI

## 2017-09-04 PROCEDURE — 93306 TTE W/DOPPLER COMPLETE: CPT

## 2017-09-04 PROCEDURE — 99221 1ST HOSP IP/OBS SF/LOW 40: CPT | Mod: GC | Performed by: INTERNAL MEDICINE

## 2017-09-04 PROCEDURE — 83550 IRON BINDING TEST: CPT

## 2017-09-04 PROCEDURE — 302135 SEQUENTIAL COMPRESSION MACHINE: Performed by: ORTHOPAEDIC SURGERY

## 2017-09-04 PROCEDURE — 700111 HCHG RX REV CODE 636 W/ 250 OVERRIDE (IP): Performed by: ORTHOPAEDIC SURGERY

## 2017-09-04 RX ORDER — LOSARTAN POTASSIUM 50 MG/1
100 TABLET ORAL DAILY
Status: DISCONTINUED | OUTPATIENT
Start: 2017-09-04 | End: 2017-09-04

## 2017-09-04 RX ORDER — OXYCODONE HYDROCHLORIDE 10 MG/1
10 TABLET ORAL EVERY 4 HOURS PRN
Status: DISCONTINUED | OUTPATIENT
Start: 2017-09-04 | End: 2017-09-12 | Stop reason: HOSPADM

## 2017-09-04 RX ORDER — HYDROMORPHONE HYDROCHLORIDE 2 MG/ML
INJECTION, SOLUTION INTRAMUSCULAR; INTRAVENOUS; SUBCUTANEOUS
Status: COMPLETED
Start: 2017-09-04 | End: 2017-09-04

## 2017-09-04 RX ORDER — LOSARTAN POTASSIUM 50 MG/1
50 TABLET ORAL DAILY
Status: DISCONTINUED | OUTPATIENT
Start: 2017-09-05 | End: 2017-09-12 | Stop reason: HOSPADM

## 2017-09-04 RX ORDER — MORPHINE SULFATE 4 MG/ML
3 INJECTION, SOLUTION INTRAMUSCULAR; INTRAVENOUS
Status: DISCONTINUED | OUTPATIENT
Start: 2017-09-04 | End: 2017-09-12 | Stop reason: HOSPADM

## 2017-09-04 RX ORDER — POLYETHYLENE GLYCOL 3350 17 G/17G
1 POWDER, FOR SOLUTION ORAL
Status: DISCONTINUED | OUTPATIENT
Start: 2017-09-04 | End: 2017-09-12 | Stop reason: HOSPADM

## 2017-09-04 RX ORDER — OXYCODONE HYDROCHLORIDE 5 MG/1
15 TABLET ORAL EVERY 4 HOURS PRN
Status: DISCONTINUED | OUTPATIENT
Start: 2017-09-04 | End: 2017-09-12 | Stop reason: HOSPADM

## 2017-09-04 RX ORDER — BISACODYL 10 MG
10 SUPPOSITORY, RECTAL RECTAL
Status: DISCONTINUED | OUTPATIENT
Start: 2017-09-04 | End: 2017-09-12 | Stop reason: HOSPADM

## 2017-09-04 RX ORDER — GABAPENTIN 300 MG/1
300 CAPSULE ORAL 3 TIMES DAILY
Status: DISCONTINUED | OUTPATIENT
Start: 2017-09-04 | End: 2017-09-12 | Stop reason: HOSPADM

## 2017-09-04 RX ORDER — ACETAMINOPHEN 325 MG/1
650 TABLET ORAL EVERY 4 HOURS PRN
Status: DISCONTINUED | OUTPATIENT
Start: 2017-09-04 | End: 2017-09-12 | Stop reason: HOSPADM

## 2017-09-04 RX ORDER — CEFAZOLIN SODIUM 2 G/100ML
2 INJECTION, SOLUTION INTRAVENOUS EVERY 8 HOURS
Status: COMPLETED | OUTPATIENT
Start: 2017-09-04 | End: 2017-09-05

## 2017-09-04 RX ORDER — OXYCODONE HYDROCHLORIDE 5 MG/1
5 TABLET ORAL EVERY 4 HOURS PRN
Status: DISCONTINUED | OUTPATIENT
Start: 2017-09-04 | End: 2017-09-04

## 2017-09-04 RX ORDER — OXYCODONE HYDROCHLORIDE 5 MG/1
TABLET ORAL
Status: COMPLETED
Start: 2017-09-04 | End: 2017-09-04

## 2017-09-04 RX ORDER — POTASSIUM CHLORIDE 20 MEQ/1
10 TABLET, EXTENDED RELEASE ORAL 2 TIMES DAILY
Status: DISCONTINUED | OUTPATIENT
Start: 2017-09-04 | End: 2017-09-05

## 2017-09-04 RX ORDER — FERROUS SULFATE 325(65) MG
325 TABLET ORAL DAILY
Status: DISCONTINUED | OUTPATIENT
Start: 2017-09-04 | End: 2017-09-04

## 2017-09-04 RX ORDER — ZOLPIDEM TARTRATE 5 MG/1
10 TABLET ORAL NIGHTLY PRN
Status: DISCONTINUED | OUTPATIENT
Start: 2017-09-04 | End: 2017-09-12 | Stop reason: HOSPADM

## 2017-09-04 RX ORDER — FELODIPINE 10 MG/1
10 TABLET, EXTENDED RELEASE ORAL DAILY
Status: DISCONTINUED | OUTPATIENT
Start: 2017-09-04 | End: 2017-09-04

## 2017-09-04 RX ORDER — PROPRANOLOL HYDROCHLORIDE 10 MG/1
10 TABLET ORAL TWICE DAILY
Status: DISCONTINUED | OUTPATIENT
Start: 2017-09-04 | End: 2017-09-04

## 2017-09-04 RX ORDER — PANTOPRAZOLE SODIUM 40 MG/1
40 TABLET, DELAYED RELEASE ORAL DAILY
Status: DISCONTINUED | OUTPATIENT
Start: 2017-09-04 | End: 2017-09-04

## 2017-09-04 RX ORDER — DEXTROSE MONOHYDRATE 25 G/50ML
25 INJECTION, SOLUTION INTRAVENOUS
Status: DISCONTINUED | OUTPATIENT
Start: 2017-09-04 | End: 2017-09-12 | Stop reason: HOSPADM

## 2017-09-04 RX ORDER — AMOXICILLIN 250 MG
2 CAPSULE ORAL 2 TIMES DAILY
Status: DISCONTINUED | OUTPATIENT
Start: 2017-09-04 | End: 2017-09-12 | Stop reason: HOSPADM

## 2017-09-04 RX ORDER — OMEPRAZOLE 20 MG/1
20 CAPSULE, DELAYED RELEASE ORAL DAILY
Status: DISCONTINUED | OUTPATIENT
Start: 2017-09-04 | End: 2017-09-12 | Stop reason: HOSPADM

## 2017-09-04 RX ORDER — DOCUSATE SODIUM 100 MG/1
100 CAPSULE, LIQUID FILLED ORAL 2 TIMES DAILY
Status: DISCONTINUED | OUTPATIENT
Start: 2017-09-04 | End: 2017-09-04

## 2017-09-04 RX ORDER — FLUTICASONE PROPIONATE 50 MCG
1 SPRAY, SUSPENSION (ML) NASAL DAILY
Status: DISCONTINUED | OUTPATIENT
Start: 2017-09-04 | End: 2017-09-12 | Stop reason: HOSPADM

## 2017-09-04 RX ORDER — OXYCODONE HYDROCHLORIDE 10 MG/1
10 TABLET ORAL EVERY 4 HOURS PRN
Status: DISCONTINUED | OUTPATIENT
Start: 2017-09-04 | End: 2017-09-04

## 2017-09-04 RX ORDER — CETIRIZINE HYDROCHLORIDE 10 MG/1
10 TABLET ORAL DAILY
Status: DISCONTINUED | OUTPATIENT
Start: 2017-09-04 | End: 2017-09-12 | Stop reason: HOSPADM

## 2017-09-04 RX ADMIN — Medication 325 MG: at 09:49

## 2017-09-04 RX ADMIN — OXYCODONE HYDROCHLORIDE 10 MG: 10 TABLET ORAL at 09:47

## 2017-09-04 RX ADMIN — OXYCODONE HYDROCHLORIDE 10 MG: 10 TABLET ORAL at 05:53

## 2017-09-04 RX ADMIN — POTASSIUM CHLORIDE 10 MEQ: 1500 TABLET, EXTENDED RELEASE ORAL at 09:00

## 2017-09-04 RX ADMIN — HYDROMORPHONE HYDROCHLORIDE 0.5 MG: 2 INJECTION INTRAMUSCULAR; INTRAVENOUS; SUBCUTANEOUS at 01:00

## 2017-09-04 RX ADMIN — STANDARDIZED SENNA CONCENTRATE AND DOCUSATE SODIUM 2 TABLET: 8.6; 5 TABLET, FILM COATED ORAL at 09:49

## 2017-09-04 RX ADMIN — OXYCODONE HYDROCHLORIDE 15 MG: 5 TABLET ORAL at 13:37

## 2017-09-04 RX ADMIN — GABAPENTIN 300 MG: 300 CAPSULE ORAL at 20:17

## 2017-09-04 RX ADMIN — CEFAZOLIN SODIUM 2 G: 2 INJECTION, SOLUTION INTRAVENOUS at 07:23

## 2017-09-04 RX ADMIN — FLUTICASONE PROPIONATE 50 MCG: 50 SPRAY, METERED NASAL at 09:00

## 2017-09-04 RX ADMIN — VANCOMYCIN HYDROCHLORIDE 1300 MG: 100 INJECTION, POWDER, LYOPHILIZED, FOR SOLUTION INTRAVENOUS at 16:45

## 2017-09-04 RX ADMIN — FELODIPINE 10 MG: 10 TABLET, EXTENDED RELEASE ORAL at 09:48

## 2017-09-04 RX ADMIN — POTASSIUM CHLORIDE 10 MEQ: 1500 TABLET, EXTENDED RELEASE ORAL at 20:19

## 2017-09-04 RX ADMIN — PROPRANOLOL HYDROCHLORIDE 10 MG: 10 TABLET ORAL at 05:52

## 2017-09-04 RX ADMIN — GABAPENTIN 300 MG: 300 CAPSULE ORAL at 09:48

## 2017-09-04 RX ADMIN — INSULIN LISPRO 1 UNITS: 100 INJECTION, SOLUTION INTRAVENOUS; SUBCUTANEOUS at 11:27

## 2017-09-04 RX ADMIN — LOSARTAN POTASSIUM 100 MG: 50 TABLET, FILM COATED ORAL at 09:49

## 2017-09-04 RX ADMIN — MORPHINE SULFATE 3 MG: 4 INJECTION INTRAVENOUS at 11:21

## 2017-09-04 RX ADMIN — CEFAZOLIN SODIUM 2 G: 2 INJECTION, SOLUTION INTRAVENOUS at 13:41

## 2017-09-04 RX ADMIN — VANCOMYCIN HYDROCHLORIDE 2200 MG: 100 INJECTION, POWDER, LYOPHILIZED, FOR SOLUTION INTRAVENOUS at 04:09

## 2017-09-04 RX ADMIN — CEFAZOLIN SODIUM 2 G: 2 INJECTION, SOLUTION INTRAVENOUS at 21:30

## 2017-09-04 RX ADMIN — OXYCODONE HYDROCHLORIDE 10 MG: 5 TABLET ORAL at 00:38

## 2017-09-04 RX ADMIN — FENTANYL CITRATE 50 MCG: 50 INJECTION, SOLUTION INTRAMUSCULAR; INTRAVENOUS at 00:45

## 2017-09-04 RX ADMIN — GABAPENTIN 300 MG: 300 CAPSULE ORAL at 16:45

## 2017-09-04 RX ADMIN — STANDARDIZED SENNA CONCENTRATE AND DOCUSATE SODIUM 2 TABLET: 8.6; 5 TABLET, FILM COATED ORAL at 20:19

## 2017-09-04 RX ADMIN — OXYCODONE HYDROCHLORIDE 15 MG: 5 TABLET ORAL at 20:18

## 2017-09-04 RX ADMIN — FENTANYL CITRATE 50 MCG: 50 INJECTION, SOLUTION INTRAMUSCULAR; INTRAVENOUS at 00:50

## 2017-09-04 RX ADMIN — CETIRIZINE HYDROCHLORIDE 10 MG: 10 TABLET, FILM COATED ORAL at 09:49

## 2017-09-04 RX ADMIN — OMEPRAZOLE 20 MG: 20 CAPSULE, DELAYED RELEASE ORAL at 09:49

## 2017-09-04 ASSESSMENT — PAIN SCALES - GENERAL
PAINLEVEL_OUTOF10: 8
PAINLEVEL_OUTOF10: 9
PAINLEVEL_OUTOF10: 8
PAINLEVEL_OUTOF10: 2
PAINLEVEL_OUTOF10: 6
PAINLEVEL_OUTOF10: ASSUMED PAIN PRESENT
PAINLEVEL_OUTOF10: 6
PAINLEVEL_OUTOF10: 7
PAINLEVEL_OUTOF10: 10
PAINLEVEL_OUTOF10: 10
PAINLEVEL_OUTOF10: 7
PAINLEVEL_OUTOF10: 6

## 2017-09-04 ASSESSMENT — COGNITIVE AND FUNCTIONAL STATUS - GENERAL
SUGGESTED CMS G CODE MODIFIER DAILY ACTIVITY: CJ
STANDING UP FROM CHAIR USING ARMS: A LITTLE
SUGGESTED CMS G CODE MODIFIER MOBILITY: CK
MOVING TO AND FROM BED TO CHAIR: A LOT
WALKING IN HOSPITAL ROOM: A LITTLE
DRESSING REGULAR LOWER BODY CLOTHING: A LOT
MOVING FROM LYING ON BACK TO SITTING ON SIDE OF FLAT BED: A LITTLE
TURNING FROM BACK TO SIDE WHILE IN FLAT BAD: A LOT
MOBILITY SCORE: 15
HELP NEEDED FOR BATHING: A LITTLE
DAILY ACTIVITIY SCORE: 20
CLIMB 3 TO 5 STEPS WITH RAILING: A LOT
TOILETING: A LITTLE

## 2017-09-04 ASSESSMENT — PATIENT HEALTH QUESTIONNAIRE - PHQ9
4. FEELING TIRED OR HAVING LITTLE ENERGY: NOT AT ALL
9. THOUGHTS THAT YOU WOULD BE BETTER OFF DEAD, OR OF HURTING YOURSELF: NOT AT ALL
SUM OF ALL RESPONSES TO PHQ QUESTIONS 1-9: 2
8. MOVING OR SPEAKING SO SLOWLY THAT OTHER PEOPLE COULD HAVE NOTICED. OR THE OPPOSITE, BEING SO FIGETY OR RESTLESS THAT YOU HAVE BEEN MOVING AROUND A LOT MORE THAN USUAL: NOT AT ALL
6. FEELING BAD ABOUT YOURSELF - OR THAT YOU ARE A FAILURE OR HAVE LET YOURSELF OR YOUR FAMILY DOWN: NOT AL ALL
5. POOR APPETITE OR OVEREATING: NOT AT ALL
1. LITTLE INTEREST OR PLEASURE IN DOING THINGS: NOT AT ALL
7. TROUBLE CONCENTRATING ON THINGS, SUCH AS READING THE NEWSPAPER OR WATCHING TELEVISION: NOT AT ALL
SUM OF ALL RESPONSES TO PHQ9 QUESTIONS 1 AND 2: 1
3. TROUBLE FALLING OR STAYING ASLEEP OR SLEEPING TOO MUCH: SEVERAL DAYS
2. FEELING DOWN, DEPRESSED, IRRITABLE, OR HOPELESS: SEVERAL DAYS

## 2017-09-04 ASSESSMENT — ENCOUNTER SYMPTOMS
FEVER: 0
FLANK PAIN: 0
PND: 0
COUGH: 0
DIAPHORESIS: 1
HEADACHES: 1
HEMOPTYSIS: 0
NAUSEA: 1
FOCAL WEAKNESS: 0
SHORTNESS OF BREATH: 0
DEPRESSION: 0
CHILLS: 1
NERVOUS/ANXIOUS: 0
WEIGHT LOSS: 0
FEVER: 1
SPEECH CHANGE: 0
CONSTIPATION: 1
DIARRHEA: 0
PALPITATIONS: 0
ORTHOPNEA: 0
SENSORY CHANGE: 0
BLOOD IN STOOL: 0
MYALGIAS: 1
DOUBLE VISION: 0
WHEEZING: 0
WEAKNESS: 1
BLURRED VISION: 0
NAUSEA: 0
HEADACHES: 0
ABDOMINAL PAIN: 0
EYE PAIN: 0
DIZZINESS: 0
TINGLING: 0
SPUTUM PRODUCTION: 0
SORE THROAT: 0
CONSTIPATION: 0
DIAPHORESIS: 0
VOMITING: 0

## 2017-09-04 ASSESSMENT — GAIT ASSESSMENTS
DISTANCE (FEET): 10
ASSISTIVE DEVICE: FRONT WHEEL WALKER
GAIT LEVEL OF ASSIST: CONTACT GUARD ASSIST

## 2017-09-04 ASSESSMENT — ACTIVITIES OF DAILY LIVING (ADL): TOILETING: INDEPENDENT

## 2017-09-04 ASSESSMENT — LIFESTYLE VARIABLES
ALCOHOL_USE: NO
EVER_SMOKED: YES

## 2017-09-04 NOTE — CARE PLAN
Received report from PACU RN, assumed care at 0230; Pt A&Ox4, slightly drowsy; Pt pain is 6-8/10 on her Rt knee; CMS intact; Swelling, bruises intact; dressing CDI; w HV in place w minimal sanguinous drainage; PIV Rt AC assessed and is patent, CDI, KVO; Pt is on 1L NC w SaO2 >90%; Call light and personal possessions within reach, discussed safety interventions w pt; Reviewed recent notes, labs, diagnostics, MD orders; POC discussed        Safety  Goal: Will remain free from injury  Outcome: PROGRESSING AS EXPECTED  Place pt call light and belongings within reached, pt calls approriately; Instructed to call for assistance, Risk for fall education implemented; Non-skid socks on when OOB; Bed lowered and locked, upper siderails up. Hourly rounding in place      Problem: Venous Thromboembolism (VTW)/Deep Vein Thrombosis (DVT) Prevention:  Goal: Patient will participate in Venous Thrombosis (VTE)/Deep Vein Thrombosis (DVT)Prevention Measures  Outcome: PROGRESSING AS EXPECTED  SCD's On. Lovenox sched       Problem: Pain Management  Goal: Pain level will decrease to patient's comfort goal  Outcome: PROGRESSING AS EXPECTED  Had adductor canal block. Feet elevated. Cold pack applied.    PT/OT ordered.    ABx: Vanco and Cefazolin

## 2017-09-04 NOTE — PROGRESS NOTES
3 mg of Morphine given for pain, returned .25 ml instead of wasting it in the med select. Noelle MONTAÑO witnessed

## 2017-09-04 NOTE — PROGRESS NOTES
2 RN skin check done with Elvia MONTAÑO. Pt had Rt knee Sx. Bruises/sweeling/redness around the right knee. Otherwise, Skin is intact.

## 2017-09-04 NOTE — PROGRESS NOTES
"Pharmacy Kinetics 57 y.o. female on vancomycin day # 1 2017    Currently on Vancomycin 1300 q12 hours (04,16)     Indication for Treatment: Septic joint  ID consulted: Yes but no note yet     Pertinent history per medical record: Admitted on 9/3/2017 for infection.  Patient was taken to OR for irrigation and debridement of infected right total knee.  Vancomycin ordered to continue post-procedure and ID to consult.       Other antibiotics: cefazolin 2 gm q8h x2 doses     Allergies: Nkda [no known drug allergy]      List concerns for renal function: None     Pertinent cultures to date:     (9/3) Tissue culture -- NGTD    (9/3) Blood culture x2 -- NGTD    Recent Labs      17   WBC  4.4*   NEUTSPOLYS  65.20     Recent Labs      17   BUN  11   CREATININE  0.68   ALBUMIN  3.5     Intake/Output Summary (Last 24 hours) at 17 1458  Last data filed at 17 0400   Gross per 24 hour   Intake             1155 ml   Output              130 ml   Net             1025 ml      Blood pressure 118/73, pulse 70, temperature 37.4 °C (99.3 °F), resp. rate 15, height 1.702 m (5' 7\"), weight 86.9 kg (191 lb 9.3 oz), last menstrual period 2008, SpO2 96 %. Temp (24hrs), Av.1 °C (98.7 °F), Min:36.6 °C (97.9 °F), Max:37.4 °C (99.3 °F)    A/P   1. Next vancomycin level: 17 at 1330 (ordered)  2. Goal trough: 12-16 mcg/mL  3. Comments: ID to see the patient. Will order a level tomorrow to assess dosing. Minimal concern for accumulation/poor clearance. WBC WNL and patient is afebrile. Pharmacy will continue to monitor and adjust dosing as clinically appropriate.     Solange Orozco, PharmD  "

## 2017-09-04 NOTE — ED NOTES
Ortho notified of lab results. Pt to restroom via wheelchair, able to stand independently. Pt given warm blanket. FSBS obtained. Pt resting, states morphine helped with the pain.

## 2017-09-04 NOTE — OR NURSING
Pt received from or via bed report received from staff. Monitors on vs wnl.    C/o pain medicated with rx'd meds min results. Dr. Avila  Provided pnb with excellent results. Pt resting comfortably.    Call to pt's daughter per pt requests. Message left updated on poc.

## 2017-09-04 NOTE — ASSESSMENT & PLAN NOTE
TKR on 8/21, developed redness/swelling/pain x48hrs.   s/p I&D with poly exchange 9/4  Synovial fluid w/ 20,000 WBCs, 99% polys  Wound Cx grew Stenotrophomonas maltophilia; ID is following. As per ID, unusual organim but only finding. Strep from blood reported as contaminant by lab.  Currently afebrile. No subj. chills or myalgias.  Plan:  - Ertapenem once a day dosing as substitute for ancef on an outpatient basis. Anticipate 6 weeks of therapy from poly exchange.  - While on antibiotics will need CBC, CMP, ESR and CRP weekly and routine PICC care  - Note: while on Bactrim, may see elevation in Cr. This is an effective of the antibiotic on the testing process and my not reflect a worsening of renal function  -Target date: 10/18

## 2017-09-04 NOTE — THERAPY
"Physical Therapy Evaluation completed.   Bed Mobility:  Supine to Sit: Moderate Assist (For R LE, very painful)  Transfers: Sit to Stand: Minimal Assist  Gait: Level Of Assist: Contact Guard Assist with Front-Wheel Walker       Plan of Care: Will benefit from Physical Therapy 4 times per week  Discharge Recommendations: Equipment: No Equipment Needed. Post-acute therapy Discharge to home with outpatient or home health for additional skilled therapy services.(depending on pain control)    See \"Rehab Therapy-Acute\" Patient Summary Report for complete documentation.     "

## 2017-09-04 NOTE — ED NOTES
Med rec updated and complete.  Allergies reviewed.  Met with pt at bedside and dicussed current medications  And last doses taken  Pt denies antibiotic use in last 30 days.

## 2017-09-04 NOTE — PROGRESS NOTES
Internal Medicine Interval Note    Name April Alicia     1960   Age/Sex 57 y.o. female   MRN [id number]   Code Status FULL     After 5PM or if no immediate response to page, please call for cross-coverage  Attending/Team: Jacqueline/Green See Patient List for primary contact information  Call [phone] to page    1st Call - Day Intern (R1):   Dr. Wen 2nd Call - Day Sr. Resident (R2/R3):   Dr. 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 She also c/o dysuria x2 days.  No CP, SOB, abd pain, subj fever, HA, N/V, or diarrhea.  Fabi increased to 10-15mg q4h with IV morphine 3mg q3h      Review of Systems   Constitutional: Positive for chills. Negative for diaphoresis, fever and malaise/fatigue.   HENT: Negative for congestion and sore throat.    Eyes: Negative for blurred vision, double vision and pain.   Respiratory: Negative for cough, hemoptysis, shortness of breath and wheezing.    Cardiovascular: Negative for chest pain, palpitations, orthopnea, leg swelling and PND.   Gastrointestinal: Negative for abdominal pain, constipation, diarrhea, nausea and vomiting.   Genitourinary: Positive for dysuria.   Musculoskeletal: Positive for myalgias.   Skin: Negative for itching and rash.   Neurological: Negative for dizziness, sensory change, speech change, focal weakness and headaches.       Consultants/Specialty  Orthopedics  Infectious Disease    Disposition  Inpatient    Quality Measures    Reviewed items::  Labs reviewed, Medications reviewed and Radiology images reviewed  Amaro catheter::  No Amaro  DVT prophylaxis pharmacological::  Enoxaparin (Lovenox)          Physical Exam       Vitals:    17 0305 17 0335 17  0405 09/04/17 0759   BP: 118/73 117/67 116/71 122/72   Pulse: 70 67 71 (!) 56   Resp: 15 15 16 15   Temp: 37.2 °C (99 °F) 37.2 °C (98.9 °F) 37.3 °C (99.2 °F) 37.3 °C (99.2 °F)   SpO2: 97% 98% 98% 93%   Weight:       Height:         Body mass index is 30.01 kg/m². Weight: 86.9 kg (191 lb 9.3 oz)  Oxygen Therapy:  Pulse Oximetry: 93 %, O2 (LPM): 0, O2 Delivery: Nasal Cannula    Physical Exam   Constitutional: She is oriented to person, place, and time.   WDWN, moderate distress   HENT:   Head: Normocephalic and atraumatic.   Mouth/Throat: No oropharyngeal exudate.   Eyes: EOM are normal. Pupils are equal, round, and reactive to light.   Neck: No tracheal deviation present.   Cardiovascular: Normal rate and regular rhythm.    Murmur (3/6 systolic decrescendo, S2 present) heard.  Pulmonary/Chest: Effort normal. No respiratory distress. She has no wheezes. She has no rales.   Abdominal: Soft. Bowel sounds are normal. There is no tenderness. There is no rebound and no guarding.   Musculoskeletal:   Right knee dressed, SONY in place w/ serosanguinous drainage. (+)edema distal to surgical site.   Neurological: She is alert and oriented to person, place, and time. No cranial nerve deficit.         Lab Data Review:         9/4/2017  11:38 AM    Recent Labs      09/03/17 1918   SODIUM  136   POTASSIUM  3.8   CHLORIDE  105   CO2  22   BUN  11   CREATININE  0.68   CALCIUM  9.3       Recent Labs      09/03/17 1918   ALTSGPT  11   ASTSGOT  20   ALKPHOSPHAT  79   TBILIRUBIN  1.3   GLUCOSE  102*       Recent Labs      09/03/17 1918 09/04/17   0547  09/04/17   0548   RBC  3.50*   --    --    HEMOGLOBIN  10.4*   --    --    HEMATOCRIT  30.9*   --    --    PLATELETCT  185   --    --    PROTHROMBTM   --   16.3*   --    INR   --   1.27*   --    IRON   --    --   34*   FERRITIN   --   133.0   --    TOTIRONBC   --    --   197*       Recent Labs      09/03/17 1918   WBC  4.4*   NEUTSPOLYS  65.20   LYMPHOCYTES  23.00   MONOCYTES  7.10    EOSINOPHILS  3.60   BASOPHILS  0.90   ASTSGOT  20   ALTSGPT  11   ALKPHOSPHAT  79   TBILIRUBIN  1.3           Assessment/Plan     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CMS-HCC)- (present on admission)   Assessment & Plan    TKR on 8/21, developed redness/swelling/pain x48hrs.   s/p I&D with poly exchange 9/4  Recent low grade fever of 100.4. Afebrile overnight but w/ subj. chills and myalgias.  Synovial fluid w/ 20,000 WBCs, 99% polys  BCx pending; gram stain shows gram pos cocci ? strep pneumo, though staph is also likely.  Plan:  Currently on Cefazolin and vanc; ID to see.   Holding iron supplementation and antihypertensives in the setting of infection.   Pain rx increased from Fabi 5-10mg q4h, now 10-15mg, with IV morphine 3mg q3h PRN.         Dysuria- (present on admission)   Assessment & Plan    Pt reports dysuria x2 days  UA pending  Currently on Ancef and vanc for infected right knee arthroplasty.        History of rheumatic fever- (present on admission)   Assessment & Plan    H/o rheumatic fever at age 26.    3/6 systolic murmur on exam, likely MR.   Unclear when pt was last seen by cards. Last echo is from 2008 showed normal LV fxn, mitral valve w/ mild calcification with mild MR.  BCx pending; gram stain positive for gram pos cocci, ? strep pneumo, though staph is also likely given prosthetic joint infection.   Plan:  - Echo ordered  - If echo or BCx is positive, will need to consider the possibility of infective endocarditis.         Type 2 diabetes mellitus with neurologic complication (CMS-HCC)- (present on admission)   Assessment & Plan    Holding metformin to avoid metabolic acidosis.  Diabetic diet, ISS  A1c 5.5%        History of hypertension- (present on admission)   Assessment & Plan    As above, holding antihypertensives (losartan, felodipine) in the setting of infection.        Normocytic anemia- (present on admission)   Assessment & Plan    Hb 10.5, baseline ~12-14  Normocytic, likely anemia of  chronic inflammatory disease.  No obvious active bleed, and blood loss from surgery was minimal; will continue to monitor.        Cirrhosis, alcoholic (CMS-HCC)- (present on admission)   Assessment & Plan    Previous U/S showed findings of cirrhosis w/ portal HTN; pt quit drinking 3 years ago. Normal LFTs, Tbili, Alb, plt. Amanda class A.  Propranolol that was started for variceal ppx was d/c'd in the setting of infection.

## 2017-09-04 NOTE — ASSESSMENT & PLAN NOTE
Baseline Hb 12-13. Has stabilized around 8.   Normocytic, likely anemia of chronic inflammatory disease, ? Iron deficiency.  Normocytic, normal RDW. Corrected retic count normal.   No obvious active bleed, no SOB, CP, abd/back pain, thigh pain or P/E changes. Blood loss from surgery was minimal, no drainage from wound currently.  fobt negative.   Plan:  - f/u daily H&H while inpt, transfuse for <7.0  - Stopped lovenox ppx, SCD in place on the left.  - Restarted patient 's home PO iron 9/7/2017

## 2017-09-04 NOTE — ASSESSMENT & PLAN NOTE
Previous U/S showed findings of cirrhosis w/ portal HTN; pt quit drinking 3 years ago. Normal LFTs, Tbili, Alb, plt. Amanda class A.  Propranolol that was started for variceal ppx was d/c'd in the setting of infection.

## 2017-09-04 NOTE — ASSESSMENT & PLAN NOTE
Pt no longer c/o dysuria  UA totally clean  Currently on Ancef and vanc for infected right knee arthroplasty.

## 2017-09-04 NOTE — OR SURGEON
Operative Report    PreOp Diagnosis: right septic total knee     PostOp Diagnosis: same     Procedure(s):  IRRIGATION & DEBRIDEMENT ORTHO, POLY EXCHANGE - Wound Class: Dirty or Infected    Surgeon(s):  Jerome Russell M.D.    Anesthesiologist/Type of Anesthesia:  Anesthesiologist: Everett Gramajo M.D./General    Surgical Staff:  Circulator: Balbir Mcmahan R.N.; Felicia Baird R.N.  Scrub Person: Leopold von Garcia    Specimens:  Cultures and tissue    Estimated Blood Loss: 100 cc    Findings: hematoma     Complications: no apparent         9/4/2017 1:08 AM Jerome Russell

## 2017-09-04 NOTE — H&P
Internal Medicine Admitting History and Physical    Name April Alicia     1960   Age/Sex 57 y.o. female   MRN [id number]   Code Status FULL CODE     After 5PM or if no immediate response to page, please call for cross-coverage  Attending/Team: Ajcqueline/Green See Patient List for primary contact information  Call [phone] to page    1st Call - Day Intern (R1):   Bettye 2nd Call - Day Sr. Resident (R2/R3):   Radha       Chief Complaint:  Worsening knee pain, fevers, and generalized weakness status post right knee arthroplasty  On 2017.    HPI:  Mrs. Alicia is a pleasant 57-year-old woman with a history of diabetes mellitus type 2, hypertension, cirrhotic liver disease, and arthritis, who underwent a right knee replacement by Dr. Lindsey of [facility] on 2017.  The patient reports that she first noticed drainage from her incision last week while undergoing physical therapy.  Three days ago, she began to feel fevers and chills with worsening pain in her right knee.  She reports that the pain was so bad that it felt the same as before the surgery.  She also complains of a headache and intermittent diaphoresis as well as generalized weakness and fatigue.  She reports nausea but no vomiting.  She reports still taking narcotic pain medications and reports associated constipation but denies hematochezia or melena.  She denies chest pain or shortness of breath.  The patient reports that yesterday, she could not get up by herself at [employer] and so came to the ER.    An arthrocentesis was performed in the ER by Dr. Russell of [facility]. The aspirate showed few WBCs and no organisms.     Review of Systems   Constitutional: Positive for chills, diaphoresis, fever and malaise/fatigue. Negative for weight loss.   Respiratory: Negative for cough and shortness of breath.    Cardiovascular: Negative for chest pain and palpitations.   Gastrointestinal: Positive for  constipation and nausea. Negative for abdominal pain, blood in stool, diarrhea, melena and vomiting.   Genitourinary: Negative for dysuria and hematuria.   Musculoskeletal: Positive for joint pain (worsening right knee pain) and myalgias.   Neurological: Positive for weakness and headaches.             Past Medical History:   Past Medical History:   Diagnosis Date   • Infection 9/4/2017   • Arthritis    • ASTHMA    • Diabetes    • Heart abnormality     leakage in left valve   • Heart valve disease    • Hypertension    • Liver disease    • Seizure disorder (CMS-HCC)        Past Surgical History:  Past Surgical History:   Procedure Laterality Date   • COLONOSCOPY WITH CLIPPING  10/28/2015    Procedure: COLONOSCOPY WITH CLIPPING;  Surgeon: Ruben Colon M.D.;  Location: ENDOSCOPY [facility];  Service:    • COLONOSCOPY WITH SCLEROTHERAPY  10/28/2015    Procedure: COLONOSCOPY WITH SCLEROTHERAPY;  Surgeon: Ruben Colon M.D.;  Location: ENDOSCOPY [facility];  Service:    • COLONOSCOPY WITH TATTOOING  10/28/2015    Procedure: COLONOSCOPY WITH TATTOOING;  Surgeon: Ruben Colon M.D.;  Location: ENDOSCOPY [facility];  Service:    • GYN SURGERY  2003    hysteroscoopy   • GYN SURGERY  1982    tubal ligation       Current Outpatient Medications:  Home Medications     Reviewed by Demetrius Oconnor (Pharmacy Tech) on 09/03/17 at 2234  Med List Status: Complete   Medication Last Dose Status   albuterol (VENTOLIN OR PROVENTIL) 108 (90 BASE) MCG/ACT Aero Soln inhalation aerosol 9/3/2017 Active   cetirizine (ZYRTEC) 10 MG Tab 9/3/2017 Active   docusate sodium (COLACE) 100 MG Cap 9/3/2017 Active   felodipine (PLENDIL) 10 MG TABLET SR 24 HR 9/3/2017 Active   ferrous sulfate 325 (65 Fe) MG tablet 9/3/2017 Active   fluticasone (FLONASE) 50 MCG/ACT nasal spray 9/3/2017 Active   gabapentin (NEURONTIN) 300 MG Cap 9/3/2017 Active   losartan (COZAAR) 100 MG Tab 9/3/2017 Active   metformin (GLUCOPHAGE) 850 MG Tab 9/3/2017  "Active   Oxycodone-Acetaminophen (PERCOCET-10)  MG Tab 9/3/2017 Active   Oyster Shell (OYST-DAILY PO) 8/15/2017 Active   pantoprazole (PROTONIX) 40 MG Tablet Delayed Response 9/3/2017 Active   potassium chloride SA (K-DUR) 10 MEQ Tab CR 9/3/2017 Active   zolpidem (AMBIEN) 10 MG Tab 9/2/2017 Active                Medication Allergy/Sensitivities:  Allergies   Allergen Reactions   • Nkda [No Known Drug Allergy]          Family History:  No family history on file.    Social History:  Social History     Social History   • Marital status: Single     Spouse name: N/A   • Number of children: N/A   • Years of education: N/A     Occupational History   • Not on file.     Social History Main Topics   • Smoking status: Former Smoker   • Smokeless tobacco: Not on file      Comment: 10/2010   • Alcohol use No   • Drug use: No   • Sexual activity: Not on file     Other Topics Concern   • Not on file     Social History Narrative   • No narrative on file     Living situation: Live with daughter in [place].  PCP : Dr. Yashira Patel      Physical Exam     Vitals:    09/04/17 0235 09/04/17 0305 09/04/17 0335 09/04/17 0405   BP: 122/75 118/73 117/67 116/71   Pulse: 77 70 67 71   Resp: 14 15 15 16   Temp: 36.8 °C (98.2 °F) 37.2 °C (99 °F) 37.2 °C (98.9 °F) 37.3 °C (99.2 °F)   SpO2: 93% 97% 98% 98%   Weight:       Height:         Body mass index is 30.01 kg/m².  /71   Pulse 71   Temp 37.3 °C (99.2 °F)   Resp 16   Ht 1.702 m (5' 7\")   Wt 86.9 kg (191 lb 9.3 oz)   LMP 06/02/2008   SpO2 98%   BMI 30.01 kg/m²   O2 therapy: Pulse Oximetry: 98 %, O2 (LPM): 1, O2 Delivery: Nasal Cannula    Physical Exam   Constitutional: She is well-developed, well-nourished, and in no distress. No distress.   HENT:   Head: Normocephalic and atraumatic.   Mouth/Throat: Oropharynx is clear and moist. No oropharyngeal exudate.   Eyes: Conjunctivae and EOM are normal. Pupils are equal, round, and reactive to light. Right eye exhibits no discharge. " Left eye exhibits no discharge. No scleral icterus.   Neck: Neck supple. No JVD present. No tracheal deviation present. No thyromegaly present.   Cardiovascular: Normal rate, regular rhythm and intact distal pulses.  Exam reveals no gallop and no friction rub.    Murmur (Grade 3 systolic ejection murmur best heard in the left axilla) heard.  Pulmonary/Chest: Effort normal and breath sounds normal. No stridor. No respiratory distress. She has no wheezes. She has no rales.   Abdominal: Soft. Bowel sounds are normal. She exhibits no distension and no mass. There is tenderness (Mild direct tenderness to palpation in the epigastrum. No peritoneal signs.). There is no rebound and no guarding.   Musculoskeletal: She exhibits edema (Edema in the right lower extremity around the knee).   Lymphadenopathy:     She has no cervical adenopathy.   Neurological: She is alert. No cranial nerve deficit. She exhibits normal muscle tone.   Skin: Skin is warm and dry. Rash (right upper thigh) noted. She is not diaphoretic. There is erythema (right lower extremity). No pallor.   Psychiatric: Affect normal.   Nursing note and vitals reviewed.      Data Review       Old Records Request:   Deferred  Current Records review and summary: Completed    Lab Data Review:  Recent Results (from the past 24 hour(s))   ACCU-CHEK GLUCOSE    Collection Time: 09/03/17  4:32 PM   Result Value Ref Range    Glucose - Accu-Ck 99 65 - 99 mg/dL   FLUID CELL COUNT    Collection Time: 09/03/17  6:56 PM   Result Value Ref Range    Fluid Type Synovial     Color-Body Fluid Red     Character-Body Fluid Bloody     Total RBC Count 371328 cells/uL    Total WBC 27775 cells/uL    Polys 99 %    Lymphs 1 %   FLUID CRYSTALS    Collection Time: 09/03/17  6:56 PM   Result Value Ref Range    Fluid Type Synovial     Crystals, Body Fluid None Seen None Seen   GRAM STAIN    Collection Time: 09/03/17  6:56 PM   Result Value Ref Range    Significant Indicator .     Source SYNO      Site Right Knee     Gram Stain Result No organisms seen.  Few WBCs.      CBC WITH DIFFERENTIAL    Collection Time: 09/03/17  7:18 PM   Result Value Ref Range    WBC 4.4 (L) 4.8 - 10.8 K/uL    RBC 3.50 (L) 4.20 - 5.40 M/uL    Hemoglobin 10.4 (L) 12.0 - 16.0 g/dL    Hematocrit 30.9 (L) 37.0 - 47.0 %    MCV 88.3 81.4 - 97.8 fL    MCH 29.7 27.0 - 33.0 pg    MCHC 33.7 33.6 - 35.0 g/dL    RDW 44.8 35.9 - 50.0 fL    Platelet Count 185 164 - 446 K/uL    MPV 9.4 9.0 - 12.9 fL    Neutrophils-Polys 65.20 44.00 - 72.00 %    Lymphocytes 23.00 22.00 - 41.00 %    Monocytes 7.10 0.00 - 13.40 %    Eosinophils 3.60 0.00 - 6.90 %    Basophils 0.90 0.00 - 1.80 %    Immature Granulocytes 0.20 0.00 - 0.90 %    Nucleated RBC 0.00 /100 WBC    Neutrophils (Absolute) 2.86 2.00 - 7.15 K/uL    Lymphs (Absolute) 1.01 1.00 - 4.80 K/uL    Monos (Absolute) 0.31 0.00 - 0.85 K/uL    Eos (Absolute) 0.16 0.00 - 0.51 K/uL    Baso (Absolute) 0.04 0.00 - 0.12 K/uL    Immature Granulocytes (abs) 0.01 0.00 - 0.11 K/uL    NRBC (Absolute) 0.00 K/uL   CMP    Collection Time: 09/03/17  7:18 PM   Result Value Ref Range    Sodium 136 135 - 145 mmol/L    Potassium 3.8 3.6 - 5.5 mmol/L    Chloride 105 96 - 112 mmol/L    Co2 22 20 - 33 mmol/L    Anion Gap 9.0 0.0 - 11.9    Glucose 102 (H) 65 - 99 mg/dL    Bun 11 8 - 22 mg/dL    Creatinine 0.68 0.50 - 1.40 mg/dL    Calcium 9.3 8.5 - 10.5 mg/dL    AST(SGOT) 20 12 - 45 U/L    ALT(SGPT) 11 2 - 50 U/L    Alkaline Phosphatase 79 30 - 99 U/L    Total Bilirubin 1.3 0.1 - 1.5 mg/dL    Albumin 3.5 3.2 - 4.9 g/dL    Total Protein 7.5 6.0 - 8.2 g/dL    Globulin 4.0 (H) 1.9 - 3.5 g/dL    A-G Ratio 0.9 g/dL   WESTERGREN SED RATE    Collection Time: 09/03/17  7:18 PM   Result Value Ref Range    Sed Rate Armandoergfernandez 62 (H) 0 - 30 mm/hour   CRP HIGH SENSITIVE (CARDIAC)    Collection Time: 09/03/17  7:18 PM   Result Value Ref Range    C Reactive Protein High Sensitive 14.2 (H) 0.0 - 7.5 mg/L   ESTIMATED GFR    Collection Time:  09/03/17  7:18 PM   Result Value Ref Range    GFR If African American >60 >60 mL/min/1.73 m 2    GFR If Non African American >60 >60 mL/min/1.73 m 2   CRP QUANTITIVE (NON-CARDIAC)    Collection Time: 09/03/17  8:26 PM   Result Value Ref Range    Stat C-Reactive Protein 1.35 (H) 0.00 - 0.75 mg/dL   ACCU-CHEK GLUCOSE    Collection Time: 09/03/17  8:37 PM   Result Value Ref Range    Glucose - Accu-Ck 107 (H) 65 - 99 mg/dL   ACCU-CHEK GLUCOSE    Collection Time: 09/04/17 12:37 AM   Result Value Ref Range    Glucose - Accu-Ck 113 (H) 65 - 99 mg/dL       Imaging/Procedures Review:    ndependant Imaging Review: Completed  DX-KNEE 3 VIEWS RIGHT   Final Result      1.  RIGHT total knee arthroplasty with anterior skin staples indicate a recent placement.   2.  Large joint effusion and diffuse soft tissue edema.   3.  No fracture, dislocation or gross bony resorption.         EKG:   EKG Independant Review: Deferred    Records reviewed and summarized in current documentation           Assessment/Plan     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CMS-HCC)- (present on admission)   Assessment & Plan    - Admit patient to Orthopedics floor.  - History of low grade fever of 100.4, fevers, and chills and report of 20,000 WBCs in arthrocentesis suggests possibility of infection.  Elevated ESR and CRP could be reactive from surgery.  Normal white count and negative gram stain of aspirate argues against an infectious process.    - Consider procalcitonin level.  - Cefazolin and vancomycin has been covered per Orthopedics, which would cover gram positives including staph epidermidis and staph aureas.   - Orthopedics to assess patient today for possible debridement in OR.  - Check INR, PT, PTT, urinalysis, and EKG.        Type 2 diabetes mellitus with neurologic complication (CMS-HCC)- (present on admission)   Assessment & Plan    - Hold patient's metformin to avoid metabolic acidosis.  - Initiate lispro insulin sliding scale.  - Check hemoglobin  A1C.  Last 6.5 2 years ago.        History of hypertension- (present on admission)   Assessment & Plan    - Continue patient's home losartan 100 mg by mouth daily.        Normocytic anemia- (present on admission)   Assessment & Plan    - Hemoglobin of 10.5.  - Iron studies, ferritin, vitamin B12 level, and folate level has been ordered.        Cirrhosis, alcoholic (CMS-HCC)- (present on admission)   Assessment & Plan    - Patient has findings of cirrhotic liver disease with portal hypertension on previous ultrasound.  She quit drinking 3 years ago as a result of her diagnosis.  Her LFTs are normal.    - Consider propanolol 10 mg by mouth twice daily for portal hypertension and variceal prophylaxis.              Anticipated Hospital stay:  >2 midnights        Quality Measures    Reviewed items::  Labs reviewed, Medications reviewed and Radiology images reviewed  Amaro catheter::  No Amaro  DVT prophylaxis pharmacological::  Enoxaparin (Lovenox)

## 2017-09-04 NOTE — CONSULTS
DATE OF SERVICE:  09/04/2017    REQUESTING PHYSICIAN:  Dr. Devi in the emergency department.    REASON FOR CONSULTATION:  Right total knee with pain and drainage.    HISTORY OF PRESENT ILLNESS:  Patient is a 57-year-old female with diabetes   mellitus who had a right total knee arthroplasty performed on 08/21/2017 by   Dr. Víctor Faustin.  She reports that over the last 2 days, she has been   having fever as high as 104 degrees.  She and her daughter report that the   knee has been having some periodic drainage which they described as yellow,   but not mack purulent drainage.  She has had some general malaise and   anorexia.  Pain with ambulation.    PAST MEDICAL HISTORY:  Relevant for arthritis, asthma, diabetes, valvular   heart disease, hypertension, liver disease, and seizure disorder.    PAST SURGICAL HISTORY:  Includes gynecological procedures in 1982 and 2003.    Colonoscopy in 2015, and right total knee arthroplasty 08/21/2017.    SOCIAL HISTORY:  Former smoker.  Reports she lives with her daughter.    MEDICATIONS:  Percocet.    ALLERGIES:  No known drug allergies.    FAMILY HISTORY:  Significant for diabetes.    REVIEW OF SYSTEMS:  GENERAL:  Malaise.  MUSCULOSKELETAL:  As above.    PHYSICAL EXAMINATION:  VITAL SIGNS:  Temperature in the emergency department 37.4, pulse 72,   respirations 16, blood pressure 123/73, sating 98% on room air.  GENERAL:  She is alert, oriented, and appropriate.  No acute distress.  HEENT:  Normocephalic, atraumatic.  Cranial nerves II-XII symmetric and   intact.  NECK:  Supple, nontender to palpation.  CHEST:  Clear to auscultation.  HEART:  Regular rate and rhythm.  ABDOMEN:  Soft and benign.  NEUROLOGIC:  Appropriate.  PSYCHIATRIC:  Appropriate.  MUSCULOSKELETAL The right knee shows erythema and warmth.  Limited range of   motion with pain and some mild drainage on the dressing.  No active drainage.    Distally, she has motor and sensory intact.    IMAGING:  Shows a total  knee without any clear abnormalities other than the   soft tissue fusion.    LABORATORY DATA:  Show a C-reactive protein of 1.35.  Sed rate is elevated at   62, glucose 102, C-reactive protein 14.2, white blood cell count of 4.4,   hemoglobin of 10.4 and platelets of 185.    IMPRESSION:  A 57-year-old female with concerns for an infected right total   knee arthroplasty.  I discussed with her the risks, benefits, rationale of   knee aspiration, the knee was aspirated.  I was able to aspirate roughly 7 mL   of dark fluid that was sent for analysis, clear of crystals, total white blood   cell count was over 20,000 with 99% polys. The Gram stain was originally   negative with few white blood cells.    IMPRESSION:  A 57-year-old female with an acutely infected right total knee   arthroplasty.  I discussed with her the risks, benefits, rationale of   proceeding to the operating room for irrigation, debridement and poly   exchange.  She demonstrated understanding and wished to proceed.       ____________________________________     MD CASSIDY Gomez / SABINE    DD:  09/04/2017 01:16:52  DT:  09/04/2017 01:39:43    D#:  [id number]  Job#:  [id number]

## 2017-09-04 NOTE — ASSESSMENT & PLAN NOTE
H/o rheumatic fever at age 26.    3/6 systolic murmur on exam, likely MR.   Echo shows no changes from 2008.  BCx 9/3 No growth, final result.    Infective endocarditis is unlikely.

## 2017-09-04 NOTE — ED PROVIDER NOTES
ED Provider Note    Scribed for Clemente Devi M.D. by Cade Fontenot. 9/3/2017, 6:45 PM.    Primary care provider: Yashira Patel M.D.  Means of arrival: Walk In  History obtained from: Patient  History limited by: None    CHIEF COMPLAINT  Chief Complaint   Patient presents with   • Post-Op Complications     had right knee replacement on 8/21. pt states her staples are due to come out tuesday. pt states she noticed pus, redness and drainage to site. pt worried about healing due to diabetes.        HPI  April Alicia is a 57 y.o. female with right knee replacement on 8/21/2017 who presents to the Emergency Department complaining ofRight knee pain and swelling that began over the past 48 hours. She notes associated redness and warmth to surgical site.   Since her surgery, patient notes improving symptoms and has been compliant with physical therapy but states her pain became severe today and is exacerbated with any movement of her knee. Patient's surgery was performed with [facility] by Dr. Faustin. Her follow up appointment to have staple removal is scheduled for 9/5/2017. Per patient, she was experiencing recent fevers with highest recorded temperature of 100.4 F 2 days ago. She is afebrile today. Patient denies chest pain, shortness of breath, nausea, or vomiting. Patient is concerned of infection and healing process secondary to DM history.    REVIEW OF SYSTEMS  See HPI,  Negative for chest pain, shortness of breath, nausea, or vomiting. Remainder of ROS negative.  C.    PAST MEDICAL HISTORY   has a past medical history of Arthritis; ASTHMA; Diabetes; Heart abnormality; Heart valve disease; Hypertension; Infection (9/4/2017); Liver disease; and Seizure disorder (CMS-HCC).    SURGICAL HISTORY   has a past surgical history that includes gyn surgery (1982); gyn surgery (2003); colonoscopy with clipping (10/28/2015); colonoscopy with sclerotherapy (10/28/2015); and colonoscopy with  "tattooing (10/28/2015).    SOCIAL HISTORY  Social History   Substance Use Topics   • Smoking status: Former Smoker      Comment: 10/2010   • Alcohol use No      History   Drug Use No       FAMILY HISTORY  Noncontributory    CURRENT MEDICATIONS  Reviewed.  See Encounter Summary.     ALLERGIES  Allergies   Allergen Reactions   • Nkda [No Known Drug Allergy]        PHYSICAL EXAM  VITAL SIGNS: /65   Pulse 78   Temp 37.4 °C (99.3 °F)   Resp 16   Ht 1.702 m (5' 7\")   Wt 86.9 kg (191 lb 9.3 oz)   LMP 06/02/2008   SpO2 98%   BMI 30.01 kg/m²   Constitutional: Awake, alert in no apparent distress.  HENT: Normocephalic, Bilateral external ears normal. Nose normal.   Eyes: Conjunctiva normal, non-icteric, EOMI.    Thorax & Lungs: Easy unlabored respirations, Clear to auscultation bilaterally  Cardiovascular:  Regular rate and rhythm  Abdomen: Nondistended , obese  Skin: Visualized skin is  Dry, No rash.   Extremities: RLE: Slight increased warmth to right knee. 15 cm incision to right anterior knee, stables intact. Moderate surround erythema.  The patient is able to flex and extend the knee though she has some significant pain with range of motion.  Neurologic: Alert, Grossly non-focal.   Psychiatric: Affect and Mood normal    DIAGNOSTIC STUDIES / PROCEDURES    RADIOLOGY  DX-KNEE 3 VIEWS RIGHT   Final Result      1.  RIGHT total knee arthroplasty with anterior skin staples indicate a recent placement.   2.  Large joint effusion and diffuse soft tissue edema.   3.  No fracture, dislocation or gross bony resorption.        The radiologist's interpretation of all radiological studies have been reviewed by me.    COURSE & MEDICAL DECISION MAKING  Nursing notes and vital signs were reviewed. Pertinent Labs & Imaging studies reviewed. (See chart for details)    6:45 PM - Patient seen and examined at bedside. Ordered right knee x-ray, CBC with differential, CMP, westergren sed rate, CRP high sensitive, blood culture, " accu-chek glucose to evaluate her symptoms.     6:52 PM - Paged Dr. Faustin, [facility].     7:05 PM - I discussed patient's case and above findings with Dr. Russell, Orthopedics. He states he will evaluate patient in ED.     8:16 PM - Reevaluated patient.     8:23 PM - I consulted with Dr. Russell, Orthopedics, regarding his evaluation.     9:20 PM - Reviewed lab results. Paged Dr. Russell [facility].     9:35 PM - I consulted with Dr. Russell, Orthopedics. Informed patient states she ate around 11:00 AM and has had minimal water this evening. At this time he advises me to hold on antibiotics until decision of surgery is made.    9:38 PM - Paged [facility].     9:50 PM - I discussed patient's case with [facility] internal medicine. Informed I have consulted with Dr. Russell, Orthopedics, and awaiting decision on surgery today or tomorrow. [facility] agree's to admit the patient and care will be transferred at this time.     1:26 AM- discussed the case with Dr. Russell regarding the results of the washout.        Decision Making:  This is a 57 y.o. year old female who presents with increased swelling and redness at the knee. Overall the knee did not appear grossly infected. The lochia is either a wound infection or possible reactive erythema though the patient does report fevers and worsening range of motion. She has a large effusion on x-ray. Dr. Russell graciously evaluated the patient and performed a arthrocentesis which revealed 20,000 white blood cell.  In the setting of acute joint replacement, this is highly suspicious for infected hardware. The patient was strictly taken from the [facility] department to the operating room for a full washout. The washout revealed necrotic blood but no obvious pus. Cultures were obtained, the patient will now be started on antibiotics.    DISPOSITION:  Patient will be admitted to [facility] in guarded condition.    FINAL IMPRESSION  1. Pyogenic arthritis of right knee  joint, due to unspecified organism (CMS-[facility])          Cade ARGUETA (Scribe), am scribing for, and in the presence of, Clemente Devi M.D..    Electronically signed by: Cade Fontenot (Scribe), 9/3/2017    Clemente ARGUETA M.D. personally performed the services described in this documentation, as scribed by Cade Fontenot in my presence, and it is both accurate and complete.    The note accurately reflects work and decisions made by me.  Clemente Devi  9/4/2017  1:27 AM

## 2017-09-04 NOTE — PROGRESS NOTES
"Pharmacy Kinetics 57 y.o. female on vancomycin day # 1 2017    Currently on Vancomycin 2200 mg iv LD followed by Vancomycin 1300 q12 hours    Indication for Treatment: Septic joint    Pertinent history per medical record: Admitted on 9/3/2017 for infection.  Patient was taken to OR for irrigation and debridement of infected right total knee.  Vancomycin ordered to continue post-procedure and ID to consult.      Other antibiotics: Px ancef    Allergies: Nkda [no known drug allergy]     List concerns for renal function: None    Pertinent cultures to date:     Tissue culture -- in process    Blood culture x2 -- in process    Recent Labs      17   WBC  4.4*   NEUTSPOLYS  65.20     Recent Labs      17   BUN  11   CREATININE  0.68   ALBUMIN  3.5     No results for input(s): VANCOTROUGH, VANCOPEAK, VANCORANDOM in the last 72 hours.  Intake/Output Summary (Last 24 hours) at 17 0216  Last data filed at 17 0058   Gross per 24 hour   Intake             1035 ml   Output              100 ml   Net              935 ml      Blood pressure 144/81, pulse 72, temperature 36.8 °C (98.3 °F), resp. rate 17, height 1.702 m (5' 7\"), weight 86.9 kg (191 lb 9.3 oz), last menstrual period 2008, SpO2 98 %. Temp (24hrs), Av.9 °C (98.4 °F), Min:36.6 °C (97.9 °F), Max:37.4 °C (99.3 °F)      A/P   1. Vancomycin dose change: New start, LD followed by vancomycin 1300 mg q12 hours  2. Next vancomycin level: Prior to 4th dose (not ordered)  3. Goal trough: 12-16 mcg/mL  4. Comments: Vancomycin started per protocol.  Rounding pharmacist to follow and order trough level as appropriate.      Trung Sofia, PharmD      "

## 2017-09-04 NOTE — CARE PLAN
Problem: Communication  Goal: The ability to communicate needs accurately and effectively will improve    Intervention: Educate patient and significant other/support system about the plan of care, procedures, treatments, medications and allow for questions  White board updated with day staff and return time.  PT notified of hourly rounding and unit routine.   Appropriate signs in place at doorway for pt.       Problem: Safety  Goal: Will remain free from falls    Intervention: Implement fall precautions  Pt calls appropriately, treaded socks in use. Personal belongings and call light with in reach and bed is locked in lowest position.      Problem: Pain Management  Goal: Pain level will decrease to patient's comfort goal    Intervention: Follow pain managment plan developed in collaboration with patient and Interdisciplinary Team  Pain management per MAR. Oxycodone 15 mg now available as well as 10 mg. Pt requests appropriately

## 2017-09-04 NOTE — OP REPORT
DATE OF SERVICE:  09/04/2017    PREOPERATIVE DIAGNOSIS:  Infected right total knee arthroplasty.    POSTOPERATIVE DIAGNOSIS:  Infected right total knee arthroplasty.    PROCEDURE:  Incision, irrigation and debridement of infected right total knee   with poly exchange.    SURGEON:  Jerome Russell MD    ASSISTANT:  Grant Cline MS3.    ANESTHESIA:  General plus adductor canal block.    ANESTHESIOLOGIST:  Everett Gramajo MD    BLOOD LOSS:  100 mL    SPECIMENS:  Both aerobic, anaerobic cultures and tissue were sent for   evaluation.    COMPLICATIONS:  No apparent.    DISPOSITION:  PACU.    CONDITION:  Stable.    INDICATIONS:  The patient is a 57-year-old female who had a right total knee   arthroplasty performed by Dr. Víctor Faustin roughly 2 weeks ago on   08/21/2017, had 2 days of fevers at home and drainage of the knee.  Evaluation   of the knee showed it to be red, showed have significant erythema and warmth   limited range of motion and pain with range of motion.  Aspiration was   positive for 20,000 white blood cells and 99% polys.  I discussed with her the   risks, benefits, rationale proceeding to the operating room for I and D and   poly exchange.  Risks including but not limited to worsening infection,   stiffness, DVT, PE, complications of anesthesia, possible need for excision   and revision arthroplasty at some point.  Informed consent was signed and   placed on the chart.  All of her questions were answered.  No guarantees   implied or given.    TECHNIQUE:  Both patient and I agreed the correct operative extremity.  The   right leg was signed and marked in preoperative holding.  Antibiotics were   held until the case was started.  She was taken to the operative suite.  After   adequate anesthesia, the right leg was sterilely prepped and draped in   standard fashion.  Tourniquet was not used.  Staples removed.  A midline   incision was revised, parapatellar arthrotomy was again incised with a  knife   and suture was removed.  Cultures and tissue were taken and she was given 2 g   IV Ancef prophylaxis.    Findings were that of a very dark necrotic appearing thick hematoma, mild poor   tissue on some of the borders.  Wound was copiously irrigated and debrided   with a total of 12 liters sterile saline, 3 liters before the poly was   excised, it was then irrigated with 9 liters sterile saline.  A 15 mm high   congruent poly was placed similar to the one that was removed.  After this was   done and also before the poly was placed the wound was irrigated with dilute   Betadine solution followed by 6 more liters of sterile saline.  Stimulan beads   with antibiotics were assembled at the back table and placed within the   joint.  A medium Hemovac was taken throughout the lateral aspect of the wound.    The wound was closed with interrupted figure-of-eight #1 PDS, 2-0 Vicryl for   subcutaneous tissue and staples for the skin.  Xeroform soft dressing was   applied.  Counts correct.  No apparent complications.  Patient was transferred   to recovery in stable condition.  Toes are pink, warm with brisk capillary   refill and good dorsalis pedis pulse.  In recovery, she had significant pain.    I consulted Dr. Everett Gramajo for pain management.  He discussed with   the patient risks, benefits, rationale adductor canal blocks she consented   this was carried out under sterile technique without complication.  The   patient has been admitted to the [facility] Medicine Service.  Tomorrow the   infectious disease service will be consulted as well to help make arrangements   for PICC antibiotics and recommendations on treatment.       ____________________________________     MD CASSIDY Gomez / SABINE    DD:  09/04/2017 01:22:27  DT:  09/04/2017 01:51:53    D#:  [id number]  Job#:  [id number]

## 2017-09-04 NOTE — SENIOR ADMIT NOTE
"[facility] INTERNAL MEDICINE SENIOR ADMIT NOTE:     Patient ID:   Name:             April Alicia   YOB: 1960  Age:                 57 y.o.  female   MRN:               [id number]                                                          Chief Complaint:       R knee pain and swelling      History of Present Illness:    Mr. Alicia is a 57 y.o. female with a PMHx of HTN, DM II, alcohol use disorder (quit 2 years ago), rheumatic fever at age of 26, liver cirrhosis, portal hypertension and E.coli bacteremia, who presents with R knee pain and swelling. Patient had R total knee arthroplasty on 8/21/2017, was doing PT therapy, noticed knee swelling and drainage during the last week associated with pain and fever within the last 2 days.  In ER: ERP consulted orthopedic, performed washout of R knee    ROS: Positive for fever, R knee swelling and pain  EX: pan-systolic murmur  LABS: Positive for WBC 4.4, HB 10.4, ESR 62 and elevated CRP   IMAGING: Positive for effusion of R knee on Xray     Active Ambulatory Problems     Diagnosis Date Noted   • BRBPR (bright red blood per rectum) 10/27/2015   • Migraine with aura and without status migrainosus, not intractable 08/15/2017   • Medication overuse headache 08/15/2017     Resolved Ambulatory Problems     Diagnosis Date Noted   • No Resolved Ambulatory Problems     Past Medical History:   Diagnosis Date   • Arthritis    • ASTHMA    • Diabetes    • Heart abnormality    • Heart valve disease    • Hypertension    • Infection 9/4/2017   • Liver disease    • Seizure disorder (CMS-[facility])        PHYSICAL EXAM  Vitals:   Weight/BMI: Body mass index is 30.01 kg/m².  Blood pressure 116/71, pulse 71, temperature 37.3 °C (99.2 °F), resp. rate 16, height 1.702 m (5' 7\"), weight 86.9 kg (191 lb 9.3 oz), last menstrual period 06/02/2008, SpO2 98 %.  Vitals:    09/04/17 0235 09/04/17 0305 09/04/17 0335 09/04/17 0405   BP: 122/75 118/73 117/67 116/71   Pulse: 77 70 67 71 "   Resp: 14 15 15 16   Temp: 36.8 °C (98.2 °F) 37.2 °C (99 °F) 37.2 °C (98.9 °F) 37.3 °C (99.2 °F)   SpO2: 93% 97% 98% 98%   Weight:       Height:         Oxygen Therapy:  Pulse Oximetry: 98 %, O2 (LPM): 1, O2 Delivery: Nasal Cannula    ASSESSMENT:  - Infected R total knee arthroplasty   - Anemia   - HTN   - DM   - Cirrhosis with portal HTN   - Remote history of rheumatic fever     PLAN:  - Admit to ortho floor   - Continue anceph and vancomycin (per ortho)  - Pending blood and synovial cultures   - Continue home meds   - Start propranolol for variceal prophylaxis   - Check HbA1c, folate, B12, lipid and iron panel   - PT/OT consult   - Primary team to consider ID consult for antibiotic duration   - Consider Echo if blood cultures are positive to evaluate for endocarditis     Please refer to Interns Dr. Bowers H&P for complete admission details.

## 2017-09-04 NOTE — THERAPY
"Occupational Therapy Evaluation completed.   Functional Status:  Mod A supine>sit EOB, mod A LB dressing, min A sit>stand able to take steps w/fww and txf to chair, able to complete grooming seated w/set up, min A  sit>Stand txf back to EOB, mod A w/sit>supine BTB pt is limited by pain and fear of pain w/mobility, educated on need to continue OOB activity and participation in ADLs RN aware   Plan of Care: Will benefit from Occupational Therapy 3 times per week  Discharge Recommendations:  Equipment: Will Continue to Assess for Equipment Needs. Post-acute therapy Discharge to a transitional care facility for continued skilled therapy services.    See \"Rehab Therapy-Acute\" Patient Summary Report for complete documentation.    "

## 2017-09-04 NOTE — NON-PROVIDER
Internal Medicine Medical Student Note    Name April Alicia     1960   Age/Sex 57 y.o. female   MRN [id number]   Code Status Full     After 5PM or if no immediate response to page, please call for cross-coverage  Attending/Team: Jacqueline See Patient List for primary contact information  Call [phone] to page after hours   1st Call - Day Intern (R1):    2nd Call - Day Sr. Resident (R2/R3):            Reason for interval visit  (Principal Problem)   R knee septic arthritis s/p TKA and I&D    Interval Problem Daily Status Update  (24 hours)   No acute overnight events. Patient reports little to no sleep due to surgery and uncontrolled pain. Her main complaint this morning is 10/10 R knee pain. She appears very tired, chronically ill and in pain. IV morphine 3mg q3h PRN ordered at that time for breakthrough pain. Patient denies CP, SOB, palpitations, HA, abd pain, N/V, D/C. She claims dysuria for 2 days.  On return later in the morning she appears much more comfortable after the pain medications and more awake and alert. She would like to speak to the surgeon about her operation and prognosis for her knee s/p I&D with poly exchange.  Initial blood culture gram stain showed gram positive cocci, possible: strep pneumo      Review of Systems   Constitutional: Positive for chills and malaise/fatigue. Negative for fever and weight loss.   HENT: Negative for congestion, hearing loss, sore throat and tinnitus.    Eyes: Negative for blurred vision and double vision.   Respiratory: Negative for cough, sputum production, shortness of breath and wheezing.    Cardiovascular: Positive for leg swelling. Negative for chest pain, palpitations and orthopnea.   Gastrointestinal: Negative for abdominal pain, constipation, diarrhea, nausea and vomiting.   Genitourinary: Positive for dysuria. Negative for flank pain, frequency, hematuria and urgency.   Musculoskeletal: Positive for myalgias.   Neurological:  Negative for dizziness, tingling, focal weakness and headaches.   Psychiatric/Behavioral: Negative for depression. The patient is not nervous/anxious.            Physical Exam       Vitals:    09/04/17 0305 09/04/17 0335 09/04/17 0405 09/04/17 0759   BP: 118/73 117/67 116/71 122/72   Pulse: 70 67 71 (!) 56   Resp: 15 15 16 15   Temp: 37.2 °C (99 °F) 37.2 °C (98.9 °F) 37.3 °C (99.2 °F) 37.3 °C (99.2 °F)   SpO2: 97% 98% 98% 93%   Weight:       Height:         Body mass index is 30.01 kg/m². Weight: 86.9 kg (191 lb 9.3 oz)  Oxygen Therapy:  Pulse Oximetry: 93 %, O2 (LPM): 0, O2 Delivery: Nasal Cannula    Physical Exam   Constitutional: She is oriented to person, place, and time. She appears unhealthy. She has a sickly appearance. She appears distressed.   HENT:   Head: Normocephalic and atraumatic.   Mouth/Throat: Oropharynx is clear and moist.   Eyes: Conjunctivae and EOM are normal. Pupils are equal, round, and reactive to light.   Neck: Neck supple. No thyromegaly present.   Pulsating mass near R carotid   Cardiovascular: Normal rate, regular rhythm, S1 normal, S2 normal and intact distal pulses.    Murmur heard.   Systolic murmur is present with a grade of 3/6   Pulmonary/Chest: Effort normal and breath sounds normal. No respiratory distress. She has no wheezes. She has no rales.   Abdominal: Soft. Bowel sounds are normal. She exhibits no distension. There is no tenderness. There is no rebound and no guarding.   Musculoskeletal:        Right lower leg: She exhibits edema.   Lymphadenopathy:     She has no cervical adenopathy.   Neurological: She is alert and oriented to person, place, and time. No cranial nerve deficit.   Skin: Skin is warm and dry. Bruising noted. No rash noted. No cyanosis or erythema. Nails show no clubbing.   R proximal thigh bruising   Psychiatric: Mood and affect normal.       Past echo in 9/2008 read by Leigh Tyler M.D. as:  FINDINGS:  1. Technically difficult study.  2. Normal LV  function.  3. Mild concentric LVH.  4. Biatrial enlargement.  5. Mitral valve with mild mitral calcification, with mild MR.  6. Mild TR with Doppler 37 mmHg.  7. Aortic valve with aortic valve sclerosis, no significant AS or     ASPIRIN.  8. No significant PI.  9. No pericardial effusion or intracardiac masses, to the limitation     of this scan.      Assessment/Plan   1. R knee septic arthritis s/p TKA and washout  - Synovial fluid w/ 20,000 WBCs, 99% polys  - Continue vancomycin and cefazolin per pharmacy recommendations and await ID recs.   - Hold propranolol and felodipine to prevent hypotension  - Hold metformin due to acute illness possibly requiring imaging  - Hold iron supplements due to acute infection  - Increase dosage of oxycodone to 10-15mg q4h PRN and added morphine IV 3mg q3h PRN to control pain  - Blood culture every 2 days for bacteremia. Initial blood culture showed gram stain with gram positive cocci, possible strep pneumo.  - Possible infective endocarditis. Patient meets 3 of the [facility] minor criteria (fever, predisposing heart condition, microbiological evidence)  - Last echo was in 2008. Repeat echo and consult cardiology to compare previous echo for any new findings indicative of IE.  - DVT prophylaxis with enoxaparin   - PT/OT evaluation once cleared by surgery    2. Dysuria  - Patient reports dysuria for 2 days  - UA ordered to check for UTI  - Denies flank pain and hematuria  - Currently on vanco and cefazolin for other infection    3. Normocytic Anemia  - Fe deficiency vs anemia chronic disease (ACD) vs blood loss  - Blood loss 2/2 surgery possible. Reported 100mL blood loss during I&D. Last colonoscopy in 2015 and no abdominal symptoms makes GI bleed less likely.  - ACD possible due to h/o multiple chronic inflammatory disease  - Fe deficiency possible due to acute infections and recurrent surgical interventions. Fe 34, ferratin 133, TIBC 197.   - Hold Fe due to acute infection    4.  DM2  - Discontinue metformin due to acute illness and possible need for imaging.  - HbA1c 5.5%  - Glucose under good control, POC in 110s and chem panel 102. Maintain current ISS therapy    5. Cirrhosis  - US in 7/2017 showed no change in heterogeneous hepatic architecture and dilated main portal vein consistent with cirrhosis and portal HTN.  - Patient claims she quit drinking alcohol 3-4 years ago and has normal LFTs, Tbili and albumin.

## 2017-09-05 LAB
ANION GAP SERPL CALC-SCNC: 5 MMOL/L (ref 0–11.9)
BASOPHILS # BLD AUTO: 1.1 % (ref 0–1.8)
BASOPHILS # BLD: 0.07 K/UL (ref 0–0.12)
BUN SERPL-MCNC: 8 MG/DL (ref 8–22)
CALCIUM SERPL-MCNC: 8.4 MG/DL (ref 8.5–10.5)
CHLORIDE SERPL-SCNC: 107 MMOL/L (ref 96–112)
CO2 SERPL-SCNC: 24 MMOL/L (ref 20–33)
CREAT SERPL-MCNC: 0.59 MG/DL (ref 0.5–1.4)
EOSINOPHIL # BLD AUTO: 0.13 K/UL (ref 0–0.51)
EOSINOPHIL NFR BLD: 2 % (ref 0–6.9)
ERYTHROCYTE [DISTWIDTH] IN BLOOD BY AUTOMATED COUNT: 45.6 FL (ref 35.9–50)
GFR SERPL CREATININE-BSD FRML MDRD: >60 ML/MIN/1.73 M 2
GLUCOSE BLD-MCNC: 114 MG/DL (ref 65–99)
GLUCOSE BLD-MCNC: 150 MG/DL (ref 65–99)
GLUCOSE BLD-MCNC: 163 MG/DL (ref 65–99)
GLUCOSE BLD-MCNC: 182 MG/DL (ref 65–99)
GLUCOSE SERPL-MCNC: 106 MG/DL (ref 65–99)
HCT VFR BLD AUTO: 27 % (ref 37–47)
HGB BLD-MCNC: 8.8 G/DL (ref 12–16)
IMM GRANULOCYTES # BLD AUTO: 0.02 K/UL (ref 0–0.11)
IMM GRANULOCYTES NFR BLD AUTO: 0.3 % (ref 0–0.9)
LYMPHOCYTES # BLD AUTO: 1.66 K/UL (ref 1–4.8)
LYMPHOCYTES NFR BLD: 25 % (ref 22–41)
MCH RBC QN AUTO: 28.9 PG (ref 27–33)
MCHC RBC AUTO-ENTMCNC: 32.6 G/DL (ref 33.6–35)
MCV RBC AUTO: 88.8 FL (ref 81.4–97.8)
MONOCYTES # BLD AUTO: 0.66 K/UL (ref 0–0.85)
MONOCYTES NFR BLD AUTO: 10 % (ref 0–13.4)
NEUTROPHILS # BLD AUTO: 4.09 K/UL (ref 2–7.15)
NEUTROPHILS NFR BLD: 61.6 % (ref 44–72)
NRBC # BLD AUTO: 0 K/UL
NRBC BLD AUTO-RTO: 0 /100 WBC
PLATELET # BLD AUTO: 178 K/UL (ref 164–446)
PMV BLD AUTO: 8.8 FL (ref 9–12.9)
POTASSIUM SERPL-SCNC: 3.5 MMOL/L (ref 3.6–5.5)
RBC # BLD AUTO: 3.04 M/UL (ref 4.2–5.4)
SODIUM SERPL-SCNC: 136 MMOL/L (ref 135–145)
VANCOMYCIN TROUGH SERPL-MCNC: 17.7 UG/ML (ref 10–20)
WBC # BLD AUTO: 6.6 K/UL (ref 4.8–10.8)

## 2017-09-05 PROCEDURE — 770001 HCHG ROOM/CARE - MED/SURG/GYN PRIV*

## 2017-09-05 PROCEDURE — 85025 COMPLETE CBC W/AUTO DIFF WBC: CPT

## 2017-09-05 PROCEDURE — 700111 HCHG RX REV CODE 636 W/ 250 OVERRIDE (IP): Performed by: ORTHOPAEDIC SURGERY

## 2017-09-05 PROCEDURE — 80202 ASSAY OF VANCOMYCIN: CPT

## 2017-09-05 PROCEDURE — 700102 HCHG RX REV CODE 250 W/ 637 OVERRIDE(OP): Performed by: HOSPITALIST

## 2017-09-05 PROCEDURE — 306481 GARMENT,FOOT IMPAD VASO: Performed by: STUDENT IN AN ORGANIZED HEALTH CARE EDUCATION/TRAINING PROGRAM

## 2017-09-05 PROCEDURE — 700102 HCHG RX REV CODE 250 W/ 637 OVERRIDE(OP): Performed by: STUDENT IN AN ORGANIZED HEALTH CARE EDUCATION/TRAINING PROGRAM

## 2017-09-05 PROCEDURE — 700111 HCHG RX REV CODE 636 W/ 250 OVERRIDE (IP)

## 2017-09-05 PROCEDURE — A9270 NON-COVERED ITEM OR SERVICE: HCPCS | Performed by: HOSPITALIST

## 2017-09-05 PROCEDURE — 82962 GLUCOSE BLOOD TEST: CPT | Mod: 91

## 2017-09-05 PROCEDURE — 700111 HCHG RX REV CODE 636 W/ 250 OVERRIDE (IP): Performed by: STUDENT IN AN ORGANIZED HEALTH CARE EDUCATION/TRAINING PROGRAM

## 2017-09-05 PROCEDURE — 700102 HCHG RX REV CODE 250 W/ 637 OVERRIDE(OP)

## 2017-09-05 PROCEDURE — A9270 NON-COVERED ITEM OR SERVICE: HCPCS

## 2017-09-05 PROCEDURE — 700105 HCHG RX REV CODE 258

## 2017-09-05 PROCEDURE — 36415 COLL VENOUS BLD VENIPUNCTURE: CPT

## 2017-09-05 PROCEDURE — 80048 BASIC METABOLIC PNL TOTAL CA: CPT

## 2017-09-05 PROCEDURE — A9270 NON-COVERED ITEM OR SERVICE: HCPCS | Performed by: STUDENT IN AN ORGANIZED HEALTH CARE EDUCATION/TRAINING PROGRAM

## 2017-09-05 PROCEDURE — 99233 SBSQ HOSP IP/OBS HIGH 50: CPT | Mod: GC | Performed by: INTERNAL MEDICINE

## 2017-09-05 PROCEDURE — A9270 NON-COVERED ITEM OR SERVICE: HCPCS | Performed by: ORTHOPAEDIC SURGERY

## 2017-09-05 PROCEDURE — 700102 HCHG RX REV CODE 250 W/ 637 OVERRIDE(OP): Performed by: ORTHOPAEDIC SURGERY

## 2017-09-05 RX ORDER — SODIUM CHLORIDE 9 MG/ML
INJECTION, SOLUTION INTRAVENOUS
Status: COMPLETED
Start: 2017-09-05 | End: 2017-09-05

## 2017-09-05 RX ORDER — POTASSIUM CHLORIDE 20 MEQ/1
20 TABLET, EXTENDED RELEASE ORAL 2 TIMES DAILY
Status: DISCONTINUED | OUTPATIENT
Start: 2017-09-05 | End: 2017-09-12 | Stop reason: HOSPADM

## 2017-09-05 RX ADMIN — SODIUM CHLORIDE 500 ML: 9 INJECTION, SOLUTION INTRAVENOUS at 05:48

## 2017-09-05 RX ADMIN — INSULIN LISPRO 1 UNITS: 100 INJECTION, SOLUTION INTRAVENOUS; SUBCUTANEOUS at 12:34

## 2017-09-05 RX ADMIN — MORPHINE SULFATE 3 MG: 4 INJECTION INTRAVENOUS at 23:49

## 2017-09-05 RX ADMIN — OXYCODONE HYDROCHLORIDE 15 MG: 5 TABLET ORAL at 00:23

## 2017-09-05 RX ADMIN — POTASSIUM CHLORIDE 20 MEQ: 1500 TABLET, EXTENDED RELEASE ORAL at 08:22

## 2017-09-05 RX ADMIN — OXYCODONE HYDROCHLORIDE 15 MG: 5 TABLET ORAL at 05:47

## 2017-09-05 RX ADMIN — GABAPENTIN 300 MG: 300 CAPSULE ORAL at 13:34

## 2017-09-05 RX ADMIN — INSULIN LISPRO 1 UNITS: 100 INJECTION, SOLUTION INTRAVENOUS; SUBCUTANEOUS at 17:00

## 2017-09-05 RX ADMIN — POTASSIUM CHLORIDE 20 MEQ: 1500 TABLET, EXTENDED RELEASE ORAL at 20:32

## 2017-09-05 RX ADMIN — OXYCODONE HYDROCHLORIDE 15 MG: 5 TABLET ORAL at 08:21

## 2017-09-05 RX ADMIN — CEFAZOLIN SODIUM 2 G: 2 INJECTION, SOLUTION INTRAVENOUS at 05:48

## 2017-09-05 RX ADMIN — STANDARDIZED SENNA CONCENTRATE AND DOCUSATE SODIUM 2 TABLET: 8.6; 5 TABLET, FILM COATED ORAL at 20:32

## 2017-09-05 RX ADMIN — OMEPRAZOLE 20 MG: 20 CAPSULE, DELAYED RELEASE ORAL at 08:22

## 2017-09-05 RX ADMIN — CETIRIZINE HYDROCHLORIDE 10 MG: 10 TABLET, FILM COATED ORAL at 08:21

## 2017-09-05 RX ADMIN — VANCOMYCIN HYDROCHLORIDE 1100 MG: 100 INJECTION, POWDER, LYOPHILIZED, FOR SOLUTION INTRAVENOUS at 16:00

## 2017-09-05 RX ADMIN — VANCOMYCIN HYDROCHLORIDE 1300 MG: 100 INJECTION, POWDER, LYOPHILIZED, FOR SOLUTION INTRAVENOUS at 03:46

## 2017-09-05 RX ADMIN — CEFAZOLIN SODIUM 2 G: 2 INJECTION, SOLUTION INTRAVENOUS at 20:32

## 2017-09-05 RX ADMIN — GABAPENTIN 300 MG: 300 CAPSULE ORAL at 08:19

## 2017-09-05 RX ADMIN — OXYCODONE HYDROCHLORIDE 15 MG: 5 TABLET ORAL at 16:36

## 2017-09-05 RX ADMIN — MORPHINE SULFATE 3 MG: 4 INJECTION INTRAVENOUS at 03:47

## 2017-09-05 RX ADMIN — POLYETHYLENE GLYCOL 3350 1 PACKET: 17 POWDER, FOR SOLUTION ORAL at 20:33

## 2017-09-05 RX ADMIN — FLUTICASONE PROPIONATE 50 MCG: 50 SPRAY, METERED NASAL at 08:22

## 2017-09-05 RX ADMIN — STANDARDIZED SENNA CONCENTRATE AND DOCUSATE SODIUM 2 TABLET: 8.6; 5 TABLET, FILM COATED ORAL at 08:21

## 2017-09-05 RX ADMIN — OXYCODONE HYDROCHLORIDE 15 MG: 5 TABLET ORAL at 20:31

## 2017-09-05 RX ADMIN — OXYCODONE HYDROCHLORIDE 15 MG: 5 TABLET ORAL at 12:34

## 2017-09-05 RX ADMIN — ENOXAPARIN SODIUM 40 MG: 100 INJECTION SUBCUTANEOUS at 08:22

## 2017-09-05 RX ADMIN — CEFAZOLIN SODIUM 2 G: 2 INJECTION, SOLUTION INTRAVENOUS at 13:34

## 2017-09-05 RX ADMIN — GABAPENTIN 300 MG: 300 CAPSULE ORAL at 20:32

## 2017-09-05 ASSESSMENT — ENCOUNTER SYMPTOMS
CHILLS: 1
SPEECH CHANGE: 0
DIARRHEA: 0
PND: 0
ABDOMINAL PAIN: 0
NERVOUS/ANXIOUS: 0
DOUBLE VISION: 0
TREMORS: 0
SHORTNESS OF BREATH: 0
FEVER: 0
CARDIOVASCULAR NEGATIVE: 1
CHILLS: 0
SENSORY CHANGE: 0
MYALGIAS: 0
EYE PAIN: 0
VOMITING: 0
FEVER: 1
DIZZINESS: 0
WHEEZING: 0
ORTHOPNEA: 0
FOCAL WEAKNESS: 0
RESPIRATORY NEGATIVE: 1
DEPRESSION: 0
SORE THROAT: 0
COUGH: 0
MYALGIAS: 1
HEMOPTYSIS: 0
CONSTIPATION: 0
FLANK PAIN: 0
BLURRED VISION: 0
DIAPHORESIS: 0
HEADACHES: 0
PALPITATIONS: 0
WEAKNESS: 1
TINGLING: 0
NAUSEA: 0
EYES NEGATIVE: 1
GASTROINTESTINAL NEGATIVE: 1

## 2017-09-05 ASSESSMENT — PAIN SCALES - GENERAL
PAINLEVEL_OUTOF10: 7
PAINLEVEL_OUTOF10: 7
PAINLEVEL_OUTOF10: 6
PAINLEVEL_OUTOF10: 8
PAINLEVEL_OUTOF10: ASSUMED PAIN PRESENT
PAINLEVEL_OUTOF10: 8

## 2017-09-05 NOTE — PROGRESS NOTES
"Pharmacy Kinetics 57 y.o. female on vancomycin day # 2 2017    Currently on Vancomycin 1300 q12 hours ()     Indication for Treatment: Septic joint  ID consulted: Yes but no note yet     Pertinent history per medical record: Admitted on 9/3/2017 for infection.  Patient was taken to OR for irrigation and debridement of infected right total knee.  Vancomycin ordered to continue post-procedure and ID to consult.       Other antibiotics: cefazolin 2 gm q8h x2 doses     Allergies: Nkda [no known drug allergy]      List concerns for renal function: None     Pertinent cultures to date:    () Blood culture x2 -- NGTD   (9/3) Tissue/wound cultures -- many collected -- 9/3 one is growing Gram negative sneha    (9/3) Blood culture x2 --  bottle growing probable Viridans streptococcus group    Recent Labs      17   0221   WBC  4.4*  6.6   NEUTSPOLYS  65.20  61.60     Recent Labs      17   0221   BUN  11  8   CREATININE  0.68  0.59   ALBUMIN  3.5   --      Recent Labs      17   1334   VANCOTROUGH  17.7     Intake/Output Summary (Last 24 hours) at 17 1446  Last data filed at 17 0700   Gross per 24 hour   Intake              480 ml   Output               10 ml   Net              470 ml      Blood pressure (!) 97/62, pulse 69, temperature 37.8 °C (100.1 °F), resp. rate 16, height 1.702 m (5' 7\"), weight 86.9 kg (191 lb 9.3 oz), last menstrual period 2008, SpO2 95 %. Temp (24hrs), Av.2 °C (98.9 °F), Min:36.4 °C (97.6 °F), Max:37.8 °C (100.1 °F)    A/P   1. Vancomycin dose change: 1100 mg q12h ()  2. Next vancomycin level: ~2 days  3. Goal trough: 12-16 mcg/mL  4. Comments: ID is now following the patient. The level today was above goal so I will dose decrease. The patient is still expected to accumulated. New goal will target a trough closer to 14 mcg/mL. WBC WNL and patient is afebrile. Cultures from the knee without growth.  Blood cx with a " possible S viridans. Pharmacy will continue to monitor and adjust dosing as clinically appropriate.     Solange Orozco, JeremyD

## 2017-09-05 NOTE — PROGRESS NOTES
PT has declined setup of CPM machine at this time.  If any further assistance needed, please call extension [phone] or place order for Ortho Technician assistance as a communication order in [employer].

## 2017-09-05 NOTE — CONSULTS
DATE OF SERVICE:  09/04/2017    REASON FOR CONSULTATION:  Septic right total knee arthroplasty.    CONSULTING PHYSICIAN:  Med Max MD    HISTORY OF PRESENT ILLNESS:  The patient is a 57-year-old female with past   medical history significant for diabetes, osteoarthritis, valvular heart   disease, liver disease, noted to be hepatitis C status post treatment, who   presented to the hospital on 09/04/2017 with reports of pain and drainage from   her right knee in which she had an arthroplasty performed on 08/21/2017.    The patient initially had surgery on 08/21/2017.  She tolerated the procedure   well.  She reported as the healing process took place.  She started developing   high fevers, malaise as well as drainage from the surgical wound sites.    There, she reported the drainage was a yellowish clear drainage that turned   into a more cloudy-appearing material over time, pain then entered to the   right knee and increased in frequency and intensity, and ultimately brought   her to the emergency room for further care.    In the emergency room, the patient was evaluated and found to have what   appeared to be hardware examination of septic joint or prosthetic joint   infection.  Dr. Russell was contacted and conducted an arthrocentesis and   subsequently took the patient to the operating room for irrigation and   debridement, additional poly exchange and Stimulan bead placements.    Infectious disease was contacted for further care.  Cultures were taken and   empiric antibiotics of vancomycin and Ancef were initiated.    Upon my interview with the patient, she confirmed the information and already   reports feeling much better post procedure.  She denies any nausea, vomiting,   diarrhea, chest pain, palpitations, shortness of breath, productive cough,   abdominal pain, rash, unilateral lower extremity calf tenderness or unilateral   weakness.    PAST MEDICAL HISTORY:  1.  Diabetes mellitus type 2,  controlled.  2.  Diet control with hemoglobin A1c 5.5.  3.  Osteoarthritis, requiring right total knee replacement.  4.  Asthma.  5.  Valvular heart disease.  6.  Hypertension.  7.  Cirrhosis-secondary to, I believe hepatitis C, status post treatment.  8.  Seizure disorder.  9.  Gynecological procedures in 1982 and 2003.    FAMILY HISTORY:  Evident for diabetes.  No known early heart attack, strokes,   or cancer.    SOCIAL HISTORY:  Former smoker.  No illicit drugs or heavy alcohol.  Lives   with her daughter.    REVIEW OF SYSTEMS:  Please see the HPI, all other systems negative on 14-point   AMA/CMS criteria.    ALLERGIES:  No known drug allergies.    MEDICATIONS:  Percocet.    PHYSICAL EXAMINATION:  VITAL SIGNS:  Temperature 37.4, pulse 70, respiratory rate 15, blood pressure   118/73, saturating 96% on room air.  GENERAL:  Patient is in bed, in no apparent distress.  HEENT:  Pupils are equal, reactive.  Moist mucous membranes.  No   lymphadenopathy.  CARDIOVASCULAR:  Normal rate, regular rhythm.  No obvious murmur.  PULMONARY:  Clear to auscultation bilaterally.  No crackles or wheezes   appreciated.  ABDOMEN:  Nontender, nondistended.  Bowel sounds normoactive.  EXTREMITIES:  Right leg in dressing with Hemovac and there is serosanguineous   drainage, left leg.  NEUROLOGIC:  At baseline.  CENTRAL NERVOUS SYSTEM:  Nonfocal.  PSYCHIATRIC:  Appropriate mood and affect.    LABORATORY DATA:  WBC 4.4, hemoglobin 10.4, hematocrit 30.9, platelets 195.    Sedimentation rate 62.  Sodium 136, potassium 3.8, chloride 105, bicarbonate 22, BUN 11, creatinine   0.68, calcium 9.3, AST 20, ALT 11, alkaline phosphatase 79, total bilirubin   1.3, albumin 3.5, total protein 7.5, globulin 4.  C-reactive protein 14.2.  Synovial fluid:  Red, bloody.  WBC is 20,460, total RBC count 100,000, polys   99%.    MICROBIOLOGY:  Synovial fluid, no growth, no organisms seen, few wbc's.  On 09/03/2017, blood culture, possible streptococcus  Wound  and tissue   cultures, wbc, no organisms.    IMPRESSION:  1.  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  2.  Diabetes mellitus type 2, uncontrolled.  3.  Osteoarthritis.  4.  Asthma.  5.  History of hepatitis C, status post treatment with cirrhosis.    PLAN:  At this period of time, patient has had her procedure and currently   awaiting culture results.  I suspect it is going to be something in the range   of the gram-positive skin organisms such as MSSA, MRSA, coagulase-negative   staph, certainly vancomycin and Ancef are appropriate at this period of time   as streptococcus was seen in her  blood, and this will be   repeated, possibly related to the knee, possibly not.  We will continue to   monitor systemic inflammatory response syndrome signs and symptoms.  We will   work with orthopedics for wound care and Hemovac drainage evaluation.    Thank you for allowing me to take part in this patient's care.  Greater than   65 minutes of care time was used during this encounter.  Greater than 50% of   the time was in direct patient care, contact and face-to-face discussion.    Case was discussed with the RN and Dr. Russell.       ____________________________________     MD BREANN Jones / SABINE    DD:  09/05/2017 08:53:04  DT:  09/05/2017 10:09:06    D#:  [id number]  Job#:  [id number]

## 2017-09-05 NOTE — PROGRESS NOTES
Infectious Disease Progress Note    Author: Kori Lewis Date & Time created: 9/5/2017  12:24 PM    Interval History:  POD # 1L I&D right knee, poly exchange  Cefazolin/Vanco    Labs Reviewed, Medications Reviewed, Fluids Reviewed and GI Nutrition Reviewed    Review of Systems:  Review of Systems   Constitutional: Positive for fever. Negative for chills.   HENT: Negative.    Eyes: Negative.    Respiratory: Negative.    Cardiovascular: Negative.    Gastrointestinal: Negative.    Genitourinary: Negative.    Musculoskeletal: Negative for joint pain.        Right knee pain.   Neurological: Positive for weakness.       Physical Exam:  Physical Exam   Constitutional: She is oriented to person, place, and time. She appears well-developed and well-nourished.   Eyes: Pupils are equal, round, and reactive to light.   Cardiovascular: Normal rate and regular rhythm.    Pulmonary/Chest: Effort normal and breath sounds normal.   Abdominal: Soft. Bowel sounds are normal.   Musculoskeletal:   Right knee in surgical dressing with ACE wrap.  Good pedal pulses on right.   Neurological: She is alert and oriented to person, place, and time.       Labs:  Recent Results (from the past 24 hour(s))   ECHOCARDIOGRAM COMP W/O CONT    Collection Time: 09/04/17 12:43 PM   Result Value Ref Range    Eject.Frac. MOD BP 60.57     Eject.Frac. MOD 4C 61.43     Eject.Frac. MOD 2C 62.49    ACCU-CHEK GLUCOSE    Collection Time: 09/04/17  4:46 PM   Result Value Ref Range    Glucose - Accu-Ck 114 (H) 65 - 99 mg/dL   ACCU-CHEK GLUCOSE    Collection Time: 09/04/17  8:27 PM   Result Value Ref Range    Glucose - Accu-Ck 152 (H) 65 - 99 mg/dL   Basic Metabolic Panel (BMP)    Collection Time: 09/05/17  2:21 AM   Result Value Ref Range    Sodium 136 135 - 145 mmol/L    Potassium 3.5 (L) 3.6 - 5.5 mmol/L    Chloride 107 96 - 112 mmol/L    Co2 24 20 - 33 mmol/L    Glucose 106 (H) 65 - 99 mg/dL    Bun 8 8 - 22 mg/dL    Creatinine 0.59 0.50 - 1.40 mg/dL    Calcium  8.4 (L) 8.5 - 10.5 mg/dL    Anion Gap 5.0 0.0 - 11.9   CBC WITH DIFFERENTIAL    Collection Time: 09/05/17  2:21 AM   Result Value Ref Range    WBC 6.6 4.8 - 10.8 K/uL    RBC 3.04 (L) 4.20 - 5.40 M/uL    Hemoglobin 8.8 (L) 12.0 - 16.0 g/dL    Hematocrit 27.0 (L) 37.0 - 47.0 %    MCV 88.8 81.4 - 97.8 fL    MCH 28.9 27.0 - 33.0 pg    MCHC 32.6 (L) 33.6 - 35.0 g/dL    RDW 45.6 35.9 - 50.0 fL    Platelet Count 178 164 - 446 K/uL    MPV 8.8 (L) 9.0 - 12.9 fL    Neutrophils-Polys 61.60 44.00 - 72.00 %    Lymphocytes 25.00 22.00 - 41.00 %    Monocytes 10.00 0.00 - 13.40 %    Eosinophils 2.00 0.00 - 6.90 %    Basophils 1.10 0.00 - 1.80 %    Immature Granulocytes 0.30 0.00 - 0.90 %    Nucleated RBC 0.00 /100 WBC    Neutrophils (Absolute) 4.09 2.00 - 7.15 K/uL    Lymphs (Absolute) 1.66 1.00 - 4.80 K/uL    Monos (Absolute) 0.66 0.00 - 0.85 K/uL    Eos (Absolute) 0.13 0.00 - 0.51 K/uL    Baso (Absolute) 0.07 0.00 - 0.12 K/uL    Immature Granulocytes (abs) 0.02 0.00 - 0.11 K/uL    NRBC (Absolute) 0.00 K/uL   ESTIMATED GFR    Collection Time: 09/05/17  2:21 AM   Result Value Ref Range    GFR If African American >60 >60 mL/min/1.73 m 2    GFR If Non African American >60 >60 mL/min/1.73 m 2   ACCU-CHEK GLUCOSE    Collection Time: 09/05/17  5:50 AM   Result Value Ref Range    Glucose - Accu-Ck 114 (H) 65 - 99 mg/dL   ACCU-CHEK GLUCOSE    Collection Time: 09/05/17 11:50 AM   Result Value Ref Range    Glucose - Accu-Ck 163 (H) 65 - 99 mg/dL     Results     Procedure Component Value Units Date/Time    FLUID CULTURE W/GRAM STAIN [[id number]] Collected:  09/03/17 6508    Order Status:  Completed Specimen:  Synovial from Synovial Fluid Updated:  09/05/17 1223     Significant Indicator NEG     Source SYNO     Site Right Knee     Culture Result Bdf No growth at 48 hours.     Gram Stain Result --     No organisms seen.  Few WBCs.      Narrative:       Right knee stat synovial fluid gram stain and culture  - call  results to Dr Russell [phone]  "   BLOOD CULTURE x2 [[id number]]  (Abnormal) Collected:  09/03/17 1918    Order Status:  Completed Specimen:  Blood from Peripheral Updated:  09/05/17 1202     Significant Indicator POS (POS)     Source BLD     Site PERIPHERAL     Blood Culture Growth detected by Bactec instrument. (A)     Blood Culture -- (A)     Streptococcus species  probable Viridans streptococcus group      Narrative:       CALL  [place]  131 tel. [phone],  CALLED  131 tel. [phone] 09/04/2017, 07:23, RB PERF. RESULTS CALLED  TO:Lory Vidal36 Rn  Per Hospital Policy: Only change Specimen Src: to \"Line\" if  specified by physician order.    BLOOD CULTURE [[id number]] Collected:  09/04/17 1204    Order Status:  Completed Specimen:  Blood from Peripheral Updated:  09/05/17 0935     Significant Indicator NEG     Source BLD     Site PERIPHERAL     Blood Culture --     No Growth    Note: Blood cultures are incubated for 5 days and  are monitored continuously.Positive blood cultures  are called to the RN and reported as soon as  they are identified.      Narrative:       Per Hospital Policy: Only change Specimen Src: to \"Line\" if  specified by physician order.    BLOOD CULTURE [[id number]] Collected:  09/04/17 1213    Order Status:  Completed Specimen:  Blood from Peripheral Updated:  09/05/17 0935     Significant Indicator NEG     Source BLD     Site PERIPHERAL     Blood Culture --     No Growth    Note: Blood cultures are incubated for 5 days and  are monitored continuously.Positive blood cultures  are called to the RN and reported as soon as  they are identified.      Narrative:       Per Hospital Policy: Only change Specimen Src: to \"Line\" if  specified by physician order.    CULTURE TISSUE W/ GRM STAIN [[id number]] Collected:  09/03/17 2327    Order Status:  Completed Specimen:  Tissue Updated:  09/04/17 1448     Significant Indicator NEG     Source TISS     Site Right Knee     Tissue Culture No growth at 24 hours.     Gram Stain Result --     Few " "WBCs.  No organisms seen.      ANAEROBIC CULTURE [[id number]] Collected:  09/03/17 2327    Order Status:  Completed Specimen:  Tissue Updated:  09/04/17 1448     Significant Indicator NEG     Source TISS     Site Right Knee     Anaerobic Culture, Culture Res Culture in progress.    ANAEROBIC CULTURE [[id number]] Collected:  09/03/17 2324    Order Status:  Completed Specimen:  Wound Updated:  09/04/17 1447     Significant Indicator NEG     Source WND     Site Right Knee Synovium     Anaerobic Culture, Culture Res Culture in progress.    CULTURE WOUND W/ GRAM STAIN [[id number]] Collected:  09/03/17 2324    Order Status:  Completed Specimen:  Wound Updated:  09/04/17 1447     Significant Indicator NEG     Source WND     Site Right Knee Synovium     Culture Result Wound No growth at 24 hours.     Gram Stain Result --     Rare WBCs.  No organisms seen.      BLOOD CULTURE x2 [[id number]] Collected:  09/03/17 2026    Order Status:  Completed Specimen:  Blood from Peripheral Updated:  09/04/17 0831     Significant Indicator NEG     Source BLD     Site PERIPHERAL     Blood Culture --     No Growth    Note: Blood cultures are incubated for 5 days and  are monitored continuously.Positive blood cultures  are called to the RN and reported as soon as  they are identified.      Narrative:       Per Hospital Policy: Only change Specimen Src: to \"Line\" if  specified by physician order.    GRAM STAIN [[id number]] Collected:  09/03/17 2324    Order Status:  Completed Specimen:  Wound Updated:  09/04/17 0755     Significant Indicator .     Source WND     Site Right Knee Synovium     Gram Stain Result --     Rare WBCs.  No organisms seen.      GRAM STAIN [[id number]] Collected:  09/03/17 2327    Order Status:  Completed Specimen:  Tissue Updated:  09/04/17 0755     Significant Indicator .     Source TISS     Site Right Knee     Gram Stain Result --     Few WBCs.  No organisms seen.      URINALYSIS [[id number]] Collected:  09/04/17 0715    " Order Status:  Completed Specimen:  Urine from Urine, Clean Catch Updated:  17 0753     Color Yellow     Character Clear     Specific Gravity 1.016     Ph 5.5     Glucose Negative mg/dL      Ketones Negative mg/dL      Protein Negative mg/dL      Bilirubin Negative     Urobilinogen, Urine 1.0     Nitrite Negative     Leukocyte Esterase Negative     Occult Blood Negative     Micro Urine Req see below     Comment: Microscopic examination not performed when specimen is clear  and chemically negative for protein, blood, leukocyte esterase  and nitrite.         Narrative:       Collected By:[id number] ALEX COMER    GRAM STAIN [[id number]] Collected:  176    Order Status:  Completed Specimen:  Synovial Updated:  17     Significant Indicator .     Source SYNO     Site Right Knee     Gram Stain Result --     No organisms seen.  Few WBCs.      Narrative:       Right knee stat synovial fluid gram stain and culture  - call  results to Dr Russell [phone]        Hemodynamics:  Temp (24hrs), Av.2 °C (98.9 °F), Min:36.4 °C (97.6 °F), Max:37.8 °C (100.1 °F)  Temperature: 37.8 °C (100.1 °F)  Pulse  Av.9  Min: 56  Max: 79   Blood Pressure: (!) 97/62        Wound:  Surgical Incision  Incision Right Knee (Active)   Wound Bed Other (comment) 2017  8:00 AM   Drainage  None 2017  8:00 AM   Periwound Skin Normal 2017  8:00 AM   Daily - Wound Closure Not Assessed 2017  8:00 AM   Dressing Options Elastic Wrap 2017  8:00 AM   Dressing Status / Change Clean;Dry;Intact 2017  8:00 AM   Daily - Dressing Change Observed 2017  8:00 AM        Fluids:  Intake/Output       17 07 - 17 0659 17 07 - 17 0659 17 07 - 17 0659      3360-6150 1914-1988 Total 7691-1427 8433-1158 Total 0149-7833 5282-5125 Total       Intake    P.O.  --  130 130  --  360 360  120  -- 120    P.O. -- 130 130 -- 360 360 120 -- 120    I.V.  --  1025 1025  --  -- --  --  -- --     Crystalloid Intake -- 1000 1000 -- -- -- -- -- --    IV Volume -- 25 25 -- -- -- -- -- --    Total Intake -- 1155 1155 -- 360 360 120 -- 120       Output    Urine  --  -- --  --  -- --  --  -- --    Number of Times Voided -- 1 x 1 x -- 2 x 2 x 2 x -- 2 x    Drains  --  30 30  10  -- 10  --  -- --    Hemovac 1 -- 30 30 10 -- 10 -- -- --    Stool  --  -- --  --  -- --  --  -- --    Number of Times Stooled -- 0 x 0 x -- -- -- -- -- --    Blood  --  100 100  --  -- --  --  -- --    Est. Blood Loss (mL) -- 100 100 -- -- -- -- -- --    Total Output -- 130 130 10 -- 10 -- -- --       Net I/O     -- 1025 1025 -10 360 350 120 -- 120           GI/Nutrition:  Orders Placed This Encounter   Procedures   • DIET ORDER     Standing Status:   Standing     Number of Occurrences:   1     Order Specific Question:   Diet:     Answer:   Diabetic [3]     Medications:  Current Facility-Administered Medications   Medication Last Dose   • potassium chloride SA (Kdur) tablet 20 mEq 20 mEq at 09/05/17 0822   • cetirizine (ZYRTEC) tablet 10 mg 10 mg at 09/05/17 0821   • fluticasone (FLONASE) nasal spray 50 mcg 50 mcg at 09/05/17 0822   • gabapentin (NEURONTIN) capsule 300 mg 300 mg at 09/05/17 0819   • zolpidem (AMBIEN) tablet 10 mg     • ceFAZolin (ANCEF) IVPB 2 g 2 g at 09/05/17 0548   • MD ALERT... vancomycin per pharmacy protocol     • enoxaparin (LOVENOX) inj 40 mg 40 mg at 09/05/17 0822   • omeprazole (PRILOSEC) capsule 20 mg 20 mg at 09/05/17 0822   • vancomycin 1,300 mg in  mL IVPB Stopped at 09/05/17 0546   • acetaminophen (TYLENOL) tablet 650 mg     • senna-docusate (PERICOLACE or SENOKOT S) 8.6-50 MG per tablet 2 Tab 2 Tab at 09/05/17 0821    And   • polyethylene glycol/lytes (MIRALAX) PACKET 1 Packet      And   • magnesium hydroxide (MILK OF MAGNESIA) suspension 30 mL      And   • bisacodyl (DULCOLAX) suppository 10 mg     • insulin lispro (HUMALOG) injection 1-6 Units Stopped at 09/04/17 1700   • glucose 4 g chewable  tablet 16 g      And   • dextrose 50% (D50W) injection 25 mL     • oxycodone immediate release (ROXICODONE) tablet 10 mg      Or   • oxycodone immediate-release (ROXICODONE) tablet 15 mg 15 mg at 09/05/17 0821   • morphine (pf) 4 mg/ml injection 3 mg 3 mg at 09/05/17 0347   • losartan (COZAAR) tablet 50 mg       Medical Decision Making, by Problem:  Active Hospital Problems    Diagnosis   • Infection [B99.9]   • 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CMS-HCC) [T84.59XA, Z96.659]   • History of rheumatic fever [Z86.79]   • Dysuria [R30.0]   • Cirrhosis, alcoholic (CMS-HCC) [K70.30]   • Normocytic anemia [D64.9]   • History of hypertension [Z86.79]   • Type 2 diabetes mellitus with neurologic complication (CMS-[facility]) [E11.49]   • Diabetic polyneuropathy associated with type 2 diabetes mellitus (CMS-[facility]) [E11.42]       Plan:  Cultures from the knee without growth.  Blood cx with a possible S viridans.  She states the wound was clearly draining purulent fluid at first then was more bloody.  She lives with her dtr, but her dtr and son in law both work.  She is not certain she could arrange reliable transportation to [facility] for IV therapy.  ? If her PMD would be willing to manage her IV therapy as an outpatient.  ? If she would benefit from SNF vs LTAC.    Discussed with pt  30 min .>50% of time spent in coordination of care/counseling.

## 2017-09-05 NOTE — NON-PROVIDER
Internal Medicine Medical Student Note    Name April Alicia     1960   Age/Sex 57 y.o. female   MRN [id number]   Code Status Full     After 5PM or if no immediate response to page, please call for cross-coverage  Attending/Team: Smiley See Patient List for primary contact information  Call [phone] to page after hours   1st Call - Day Intern (R1):    2nd Call - Day Sr. Resident (R2/R3):            Reason for interval visit  (Principal Problem)   R knee septic arthritis s/p TKR and I&D    Interval Problem Daily Status Update  (24 hours)   No acute overnight events. Patient was up sitting in a chair when I saw her this morning and appears very comfortable. She reports sleeping well overnight and return of her appetite. Still claims 5/6 pain but is manageable with the current pain medications. She has only used the PRN morphine once. Denies SOB, CP, palpitations, numbness, tingling.  Echo yesterday showed no change since last echo in .  Cultures remain negative.  UA returned negative.    Review of Systems   Constitutional: Positive for chills. Negative for fever and malaise/fatigue.   HENT: Negative for congestion, hearing loss, sore throat and tinnitus.    Eyes: Negative for blurred vision and double vision.   Cardiovascular: Positive for leg swelling. Negative for chest pain, palpitations and orthopnea.   Gastrointestinal: Negative for abdominal pain, constipation, diarrhea, nausea and vomiting.   Genitourinary: Positive for dysuria. Negative for flank pain, frequency, hematuria and urgency.   Musculoskeletal: Positive for joint pain and myalgias.   Skin: Negative for rash.   Neurological: Negative for dizziness, tingling, tremors, sensory change, focal weakness and headaches.   Psychiatric/Behavioral: Negative for depression. The patient is not nervous/anxious.                Physical Exam       Vitals:    17 0027 17 0400 17 0746 17 1154   BP: 116/65 107/68  (!) 96/63 (!) 97/62   Pulse: 73 73 71 69   Resp: 18 16 17 16   Temp: 36.6 °C (97.8 °F) 36.4 °C (97.6 °F) 37.4 °C (99.4 °F) 37.8 °C (100.1 °F)   SpO2: 93% 96% 92% 95%   Weight:       Height:         Body mass index is 30.01 kg/m².    Oxygen Therapy:  Pulse Oximetry: 95 %, O2 (LPM): 0, O2 Delivery: None (Room Air)    Physical Exam   Constitutional: She is oriented to person, place, and time and well-developed, well-nourished, and in no distress.   HENT:   Head: Normocephalic and atraumatic.   Mouth/Throat: Oropharynx is clear and moist.   Eyes: Conjunctivae and EOM are normal. Pupils are equal, round, and reactive to light.   Neck: Neck supple. No JVD present. No thyromegaly present.   Cardiovascular: Normal rate, regular rhythm, S1 normal, S2 normal and intact distal pulses.  Exam reveals no gallop.    Murmur heard.   Systolic murmur is present with a grade of 3/6   Pulmonary/Chest: Effort normal. No respiratory distress. She has no wheezes. She has no rales.   Abdominal: Soft. Bowel sounds are normal. She exhibits no distension. There is no tenderness. There is no rebound and no guarding.   Musculoskeletal:        Right lower leg: She exhibits edema.   Lymphadenopathy:     She has no cervical adenopathy.   Neurological: She is alert and oriented to person, place, and time.   Skin: Skin is warm and dry. Bruising noted. No cyanosis or erythema. Nails show no clubbing.   R proximal thigh bruising   Psychiatric: Mood and affect normal.             Assessment/Plan   1. R knee septic arthritis s/p TKA and washout  - Synovial fluid w/ 20,000 WBCs, 99% polys  - Continue vancomycin and cefazolin per pharmacy recommendations and await ID recs.   - Hold propranolol and felodipine to prevent hypotension  - Hold metformin due to acute illness possibly requiring imaging  - Hold iron supplements due to acute infection  - Oxycodone 10-15mg q4h PRN and morphine IV 3mg q3h PRN to control pain  - Blood culture every 2 days for  bacteremia. Initial blood culture showed gram stain with gram positive cocci, possible strep pneumo. Cultures still negative  - Likely not infective endocarditis due to no change in echo since 2008.  - DVT prophylaxis with enoxaparin   - PT/OT evaluation recommend discharge to SNF  - PICC line placement and discharge tentatively planned for tomorrow     2. Dysuria  - Patient reports dysuria for 2 days  - UA returned clean with no signs of infection  - Denies flank pain and hematuria  - Currently on vanco and cefazolin for other infection     3. Normocytic Anemia  - Hgb 8.8 down from 10.4. Hct 27 down from 30.9  - Fe deficiency vs anemia chronic disease (ACD) vs blood loss  - Blood loss 2/2 surgery possible. Reported 100mL blood loss during I&D. Last colonoscopy in 2015 and no abdominal symptoms makes GI bleed less likely.  - ACD possible due to h/o multiple chronic inflammatory disease and current bacteremia  - Fe deficiency possible due to acute infections and recurrent surgical interventions. Fe 34, ferratin 133, TIBC 197.   - Hold Fe due to acute infection  - Transfuse RBCs if Hgb < 7  - Repeat Fe, B12, folate, ferratin, haptin, FOBT     4. DM2  - Discontinue metformin due to acute illness and possible need for imaging.  - HbA1c 5.5%  - Glucose under good control, POC in 110s and chem panel 106. Maintain current ISS therapy     5. Cirrhosis  - US in 7/2017 showed no change in heterogeneous hepatic architecture and dilated main portal vein consistent with cirrhosis and portal HTN.  - Patient claims she quit drinking alcohol 3-4 years ago and has normal LFTs, Tbili and albumin.

## 2017-09-05 NOTE — CARE PLAN
Problem: Safety  Goal: Will remain free from falls    Intervention: Implement fall precautions  Pt calls appropriately, treaded socks in use. Personal belongings and call light with in reach and bed is locked in lowest position.      Problem: Pain Management  Goal: Pain level will decrease to patient's comfort goal  Outcome: PROGRESSING AS EXPECTED  Pt able to sit up in the chair and ambulate moire frequently with adequate pain management  Intervention: Follow pain managment plan developed in collaboration with patient and Interdisciplinary Team  Pain is well managed withg 15 mg oxy Q4H with Morphine available for  breakthrough pain.

## 2017-09-05 NOTE — RESPIRATORY CARE
COPD EDUCATION by COPD CLINICAL EDUCATOR  9/5/2017 at 8:00 AM by Daniela Morris     Patient reviewed by COPD education team. Patient does not qualify for COPD program.

## 2017-09-05 NOTE — DISCHARGE PLANNING
MIMI met with patient at bedside to discuss her transitional care needs. Patient was previously going to outpatient therapy at [facility]. Patient states she has already talked to them and they are willing to come to her house if she cant make it to outpatient. Patient requested to return to services with them and handle setting up those services on her own once discharged from hospital.

## 2017-09-05 NOTE — CONSULTS
ID Note    Dictation to follow    Requesting Physician: Dr Russell    The patient is a 57-year-old female with PMH significant for diabetes   mellitus who had a right total knee arthroplasty performed on 08/21/2017 then developed fever, periodic yellow fluid drainage and general malaise.     Taken for washout today by Dr Russell after arthrocentesis suspicious for infection. Fluid with heavy blood. Gram stain negative for organisms. ID consulted.     Continue Vanco/Ancef for now. Await cultures.    Dr Max

## 2017-09-05 NOTE — CARE PLAN
Received report from Day RN, assumed care at 1915; Pt A&Ox4, lying on bed sleeping; Pt pain is 6-8/10 on her Rt knee; CMS intact; Swelling, bruises noted; dressing CDI; w HV in place w minimal serosanguinous drainage; PIV Rt AC assessed and is patent, CDI, KVO; Pt is on RA w SaO2 >90%; Call light and personal possessions within reach, discussed safety interventions w pt; Reviewed recent notes, labs, diagnostics, MD orders; POC discussed        Safety  Goal: Will remain free from injury  Outcome: PROGRESSING AS EXPECTED  Place pt call light and belongings within reached, pt calls approriately; Instructed to call for assistance, Risk for fall education implemented; Non-skid socks on when OOB; Bed lowered and locked, upper siderails up. Hourly rounding in place      Problem: Venous Thromboembolism (VTW)/Deep Vein Thrombosis (DVT) Prevention:  Goal: Patient will participate in Venous Thrombosis (VTE)/Deep Vein Thrombosis (DVT)Prevention Measures  Outcome: PROGRESSING AS EXPECTED  SCD's On. Lovenox sched       Problem: Pain Management  Goal: Pain level will decrease to patient's comfort goal  Outcome: PROGRESSING AS EXPECTED  Medicated per MAR. Cold pack applied. Feet elevated. Pt verbalize adequate relief of pain, able to sleep      ABx: Vanco and Cefazolin

## 2017-09-05 NOTE — DISCHARGE PLANNING
Care Transition Team Assessment    Met with pt at bedside, pt states she will discharge home with no needs. Pt lives with daughter and she provides transportation. Pt may have home health or have outpatient physical therapy come to her house. Pt has walker and a cane.    Information Source  Orientation : Oriented x 4  Information Given By: Patient  Informant's Name: April Alicia  Who is responsible for making decisions for patient? : Patient    Readmission Evaluation  Is this a readmission?: No    Elopement Risk  Legal Hold: No  Ambulatory or Self Mobile in Wheelchair: No-Not an Elopement Risk  Elopement Risk: Not at Risk for Elopement    Interdisciplinary Discharge Planning  Primary Care Physician: Yashira Patel  Lives with - Patient's Self Care Capacity: Adult Children  Support Systems: Family Member(s)  Housing / Facility: [address]  Do You Take your Prescribed Medications Regularly: Yes  Able to Return to Previous ADL's: Yes  Prior Services: None  Patient Expects to be Discharged to:: Home  Assistance Needed: Yes  Durable Medical Equipment: Walker    Discharge Preparedness  What is your plan after discharge?: Home with help  What are your discharge supports?: Child  Prior Functional Level: Ambulatory, Independent with Activities of Daily Living, Independent with Medication Management, Uses Cane, Uses Walker  Difficulity with ADLs: Walking  Difficulty with ADLs Comment: Pt has cane and walker  Difficulity with IADLs: Driving  Difficulity with IADL Comments: Pt doesn't drive    Functional Assesment  Prior Functional Level: Ambulatory, Independent with Activities of Daily Living, Independent with Medication Management, Uses Cane, Uses Walker    Finances  Financial Barriers to Discharge: No  Prescription Coverage: Yes (Pharmacy: [facility], [address] [place])    Vision / Hearing Impairment  Vision Impairment : Yes  Right Eye Vision: Wears Glasses  Left Eye Vision: Wears Glasses  Hearing Impairment :  Yes  Hearing Impairment: Left Ear, Hearing Device Not Available    Values / Beliefs / Concerns  Values / Beliefs Concerns : No    Advance Directive  Advance Directive?: None  Advance Directive offered?: AD Booklet refused    Domestic Abuse  Have you ever been the victim of abuse or violence?: No    Psychological Assessment  History of Substance Abuse: None  History of Psychiatric Problems: No  Non-compliant with Treatment: No  Newly Diagnosed Illness: No    Discharge Risks or Barriers  Discharge risks or barriers?: No    Anticipated Discharge Information  Anticipated discharge disposition: Home  Discharge Address: [address] [place] [address]  Discharge Contact Phone Number: [phone]

## 2017-09-05 NOTE — PROGRESS NOTES
"Post op day # 2    No New Complaints, pain well controlled    Blood pressure (!) 96/63, pulse 71, temperature 37.4 °C (99.4 °F), resp. rate 17, height 1.702 m (5' 7\"), weight 86.9 kg (191 lb 9.3 oz), last menstrual period 06/02/2008, SpO2 92 %.    Neurovascular intact  Wound Clean and Dry, hemovac with minimal bloody drainage  Wound cultures NG at 24 hours, blood culture with GPCs, possibly Strep    Recent Labs      09/03/17 1918 09/05/17   0221   WBC  4.4*  6.6   RBC  3.50*  3.04*   HEMOGLOBIN  10.4*  8.8*   HEMATOCRIT  30.9*  27.0*   MCV  88.3  88.8   MCH  29.7  28.9   MCHC  33.7  32.6*   RDW  44.8  45.6   PLATELETCT  185  178   MPV  9.4  8.8*     Recent Labs      09/03/17 1918 09/05/17 0221   SODIUM  136  136   POTASSIUM  3.8  3.5*   CHLORIDE  105  107   CO2  22  24   GLUCOSE  102*  106*   BUN  11  8   CREATININE  0.68  0.59   CALCIUM  9.3  8.4*       Plan: IV antibiotics per ID.  Will leave drain in today.  PT for ambulation, aggressive ROM.  Will get CPM to help with motion  "

## 2017-09-05 NOTE — PROGRESS NOTES
Internal Medicine Interval Note    Name April Alicia     1960   Age/Sex 57 y.o. female   MRN [id number]   Code Status FULL     After 5PM or if no immediate response to page, please call for cross-coverage  Attending/Team: Jo/Jovany See Patient List for primary contact information  Call [phone] to page    1st Call - Day Intern (R1):   Dr. Wen 2nd Call - Day Sr. Resident (R2/R3):   Dr. 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  Pt greatly improved from yesterday. Up in chair this morning eating breakfast.   Still c/o some knee pain but adequately controlled with PRNs.  No generalized pain, no CP, palp, SOB, N/V, diarrhea.         Review of Systems   Constitutional: Negative for chills, diaphoresis, fever and malaise/fatigue.   HENT: Negative for congestion and sore throat.    Eyes: Negative for blurred vision, double vision and pain.   Respiratory: Negative for cough, hemoptysis, shortness of breath and wheezing.    Cardiovascular: Negative for chest pain, palpitations, orthopnea, leg swelling and PND.   Gastrointestinal: Negative for abdominal pain, constipation, diarrhea, nausea and vomiting.   Genitourinary: Positive for dysuria.   Musculoskeletal: Negative for myalgias.   Skin: Negative for itching and rash.   Neurological: Negative for dizziness, sensory change, speech change, focal weakness and headaches.       Consultants/Specialty  Orthopedics  Infectious Disease    Disposition  Inpatient    Quality Measures    Reviewed items::  Labs reviewed, Medications reviewed and Radiology images reviewed  Amaro catheter::  No Amaro  DVT prophylaxis pharmacological::  Enoxaparin (Lovenox)          Physical Exam       Vitals:    17 0027 17 0400 17 0746 17 1154   BP: 116/65 107/68 (!) 96/63 (!) 97/62    Pulse: 73 73 71 69   Resp: 18 16 17 16   Temp: 36.6 °C (97.8 °F) 36.4 °C (97.6 °F) 37.4 °C (99.4 °F) 37.8 °C (100.1 °F)   SpO2: 93% 96% 92% 95%   Weight:       Height:         Body mass index is 30.01 kg/m².    Oxygen Therapy:  Pulse Oximetry: 95 %, O2 (LPM): 0, O2 Delivery: None (Room Air)    Physical Exam   Constitutional: She is oriented to person, place, and time.   WDWN, moderate distress   HENT:   Head: Normocephalic and atraumatic.   Mouth/Throat: No oropharyngeal exudate.   Eyes: EOM are normal. Pupils are equal, round, and reactive to light.   Neck: No tracheal deviation present.   Cardiovascular: Normal rate and regular rhythm.    Murmur (3/6 systolic decrescendo, S2 present) heard.  Pulmonary/Chest: Effort normal. No respiratory distress. She has no wheezes. She has no rales.   Abdominal: Soft. Bowel sounds are normal. There is no tenderness. There is no rebound and no guarding.   Musculoskeletal:   Right knee dressed, SONY in place w/ serosanguinous drainage. (+)edema distal to surgical site, unchanged from previous   Neurological: She is alert and oriented to person, place, and time. No cranial nerve deficit.         Lab Data Review:         9/4/2017  11:38 AM    Recent Labs      09/03/17 1918 09/05/17 0221   SODIUM  136  136   POTASSIUM  3.8  3.5*   CHLORIDE  105  107   CO2  22  24   BUN  11  8   CREATININE  0.68  0.59   CALCIUM  9.3  8.4*       Recent Labs      09/03/17 1918 09/05/17 0221   ALTSGPT  11   --    ASTSGOT  20   --    ALKPHOSPHAT  79   --    TBILIRUBIN  1.3   --    GLUCOSE  102*  106*       Recent Labs      09/03/17 1918 09/04/17 0547  09/04/17 0548 09/05/17 0221   RBC  3.50*   --    --   3.04*   HEMOGLOBIN  10.4*   --    --   8.8*   HEMATOCRIT  30.9*   --    --   27.0*   PLATELETCT  185   --    --   178   PROTHROMBTM   --   16.3*   --    --    INR   --   1.27*   --    --    IRON   --    --   34*   --    FERRITIN   --   133.0   --    --    TOTIRONBC   --    --   197*    --        Recent Labs      09/03/17 1918  09/05/17   0221   WBC  4.4*  6.6   NEUTSPOLYS  65.20  61.60   LYMPHOCYTES  23.00  25.00   MONOCYTES  7.10  10.00   EOSINOPHILS  3.60  2.00   BASOPHILS  0.90  1.10   ASTSGOT  20   --    ALTSGPT  11   --    ALKPHOSPHAT  79   --    TBILIRUBIN  1.3   --            Assessment/Plan     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CMS-HCC)- (present on admission)   Assessment & Plan    TKR on 8/21, developed redness/swelling/pain x48hrs.   s/p I&D with poly exchange 9/4  Tmax o/n of 100.0, currently afebrile. No subj. chills or myalgias.  Synovial fluid w/ 20,000 WBCs, 99% polys  Wound Cx NGTD.  Plan:  Currently on Cefazolin and vanc; ID is awaiting BCx.   Will likely need 4-6 of abx; will place PICC after 2 days of clean BCx. Pt denies any current IV drug use.  Holding iron supplementation and antihypertensives in the setting of infection.   Pain adequately controlled w/ Fabi 10-15mg q4h, with IV morphine 3mg q3h PRN.         Dysuria- (present on admission)   Assessment & Plan    Pt reports dysuria x2 days  UA totally clean  Currently on Ancef and vanc for infected right knee arthroplasty.        History of rheumatic fever- (present on admission)   Assessment & Plan    H/o rheumatic fever at age 26.    3/6 systolic murmur on exam, likely MR.   Echo shows no changes from 2008.  BCx pending; gram stain positive for gram pos cocci, ? strep pneumo, though staph is also likely given prosthetic joint infection.   Plan:  - f/u BCx, however given unchanged echo and clinical improvement, infective endocarditis is unlikely.        Type 2 diabetes mellitus with neurologic complication (CMS-HCC)- (present on admission)   Assessment & Plan    Holding metformin to avoid metabolic acidosis.  Diabetic diet, ISS  A1c 5.5%        History of hypertension- (present on admission)   Assessment & Plan    As above, holding antihypertensives (losartan, felodipine) in the setting of infection.        Normocytic  anemia- (present on admission)   Assessment & Plan    Hb 8.8 today down from 10.5, baseline ~12-14  Normocytic, likely anemia of chronic inflammatory disease.  No obvious active bleed, blood loss from surgery was minimal, wound now with only minimal serosanguinous drainage; will continue to monitor.        Cirrhosis, alcoholic (CMS-HCC)- (present on admission)   Assessment & Plan    Previous U/S showed findings of cirrhosis w/ portal HTN; pt quit drinking 3 years ago. Normal LFTs, Tbili, Alb, plt. Amanda class A.  Propranolol that was started for variceal ppx was d/c'd in the setting of infection.

## 2017-09-06 ENCOUNTER — APPOINTMENT (OUTPATIENT)
Dept: RADIOLOGY | Facility: MEDICAL CENTER | Age: 57
DRG: 464 | End: 2017-09-06
Attending: STUDENT IN AN ORGANIZED HEALTH CARE EDUCATION/TRAINING PROGRAM
Payer: MEDICAID

## 2017-09-06 PROBLEM — R30.0 DYSURIA: Status: RESOLVED | Noted: 2017-09-04 | Resolved: 2017-09-06

## 2017-09-06 LAB
ABO GROUP BLD: NORMAL
ANION GAP SERPL CALC-SCNC: 4 MMOL/L (ref 0–11.9)
BACTERIA BLD CULT: ABNORMAL
BACTERIA BLD CULT: ABNORMAL
BACTERIA FLD AEROBE CULT: NORMAL
BACTERIA TISS AEROBE CULT: NORMAL
BACTERIA WND AEROBE CULT: ABNORMAL
BACTERIA WND AEROBE CULT: ABNORMAL
BASOPHILS # BLD AUTO: 0.7 % (ref 0–1.8)
BASOPHILS # BLD: 0.03 K/UL (ref 0–0.12)
BILIRUB CONJ SERPL-MCNC: 0.3 MG/DL (ref 0.1–0.5)
BILIRUB INDIRECT SERPL-MCNC: 0.6 MG/DL (ref 0–1)
BILIRUB SERPL-MCNC: 0.9 MG/DL (ref 0.1–1.5)
BLD GP AB SCN SERPL QL: NORMAL
BUN SERPL-MCNC: 12 MG/DL (ref 8–22)
CALCIUM SERPL-MCNC: 8.2 MG/DL (ref 8.5–10.5)
CHLORIDE SERPL-SCNC: 108 MMOL/L (ref 96–112)
CO2 SERPL-SCNC: 22 MMOL/L (ref 20–33)
CREAT SERPL-MCNC: 0.68 MG/DL (ref 0.5–1.4)
EOSINOPHIL # BLD AUTO: 0.23 K/UL (ref 0–0.51)
EOSINOPHIL NFR BLD: 5.7 % (ref 0–6.9)
ERYTHROCYTE [DISTWIDTH] IN BLOOD BY AUTOMATED COUNT: 46.5 FL (ref 35.9–50)
FOLATE SERPL-MCNC: >23.7 NG/ML
GFR SERPL CREATININE-BSD FRML MDRD: >60 ML/MIN/1.73 M 2
GLUCOSE BLD-MCNC: 121 MG/DL (ref 65–99)
GLUCOSE BLD-MCNC: 124 MG/DL (ref 65–99)
GLUCOSE BLD-MCNC: 141 MG/DL (ref 65–99)
GLUCOSE BLD-MCNC: 167 MG/DL (ref 65–99)
GLUCOSE SERPL-MCNC: 115 MG/DL (ref 65–99)
GRAM STN SPEC: ABNORMAL
GRAM STN SPEC: NORMAL
GRAM STN SPEC: NORMAL
HCT VFR BLD AUTO: 24.1 % (ref 37–47)
HCT VFR BLD AUTO: 25.7 % (ref 37–47)
HGB BLD-MCNC: 7.7 G/DL (ref 12–16)
HGB BLD-MCNC: 8.4 G/DL (ref 12–16)
HGB RETIC QN AUTO: 29.6 PG/CELL (ref 29–35)
IMM GRANULOCYTES # BLD AUTO: 0.02 K/UL (ref 0–0.11)
IMM GRANULOCYTES NFR BLD AUTO: 0.5 % (ref 0–0.9)
IMM RETICS NFR: 25.4 % (ref 9.3–17.4)
LDH SERPL-CCNC: 204 U/L (ref 107–266)
LYMPHOCYTES # BLD AUTO: 1.25 K/UL (ref 1–4.8)
LYMPHOCYTES NFR BLD: 31.1 % (ref 22–41)
MCH RBC QN AUTO: 29.2 PG (ref 27–33)
MCHC RBC AUTO-ENTMCNC: 32 G/DL (ref 33.6–35)
MCV RBC AUTO: 91.3 FL (ref 81.4–97.8)
MONOCYTES # BLD AUTO: 0.46 K/UL (ref 0–0.85)
MONOCYTES NFR BLD AUTO: 11.4 % (ref 0–13.4)
NEUTROPHILS # BLD AUTO: 2.03 K/UL (ref 2–7.15)
NEUTROPHILS NFR BLD: 50.6 % (ref 44–72)
NRBC # BLD AUTO: 0 K/UL
NRBC BLD AUTO-RTO: 0 /100 WBC
PLATELET # BLD AUTO: 136 K/UL (ref 164–446)
PMV BLD AUTO: 9.8 FL (ref 9–12.9)
POTASSIUM SERPL-SCNC: 3.7 MMOL/L (ref 3.6–5.5)
RBC # BLD AUTO: 2.64 M/UL (ref 4.2–5.4)
RETICS # AUTO: 0.09 M/UL (ref 0.04–0.06)
RETICS/RBC NFR: 3.3 % (ref 0.8–2.1)
RH BLD: NORMAL
SIGNIFICANT IND 70042: ABNORMAL
SIGNIFICANT IND 70042: ABNORMAL
SIGNIFICANT IND 70042: NORMAL
SIGNIFICANT IND 70042: NORMAL
SITE SITE: ABNORMAL
SITE SITE: ABNORMAL
SITE SITE: NORMAL
SITE SITE: NORMAL
SODIUM SERPL-SCNC: 134 MMOL/L (ref 135–145)
SOURCE SOURCE: ABNORMAL
SOURCE SOURCE: ABNORMAL
SOURCE SOURCE: NORMAL
SOURCE SOURCE: NORMAL
VIT B12 SERPL-MCNC: >1500 PG/ML (ref 211–911)
WBC # BLD AUTO: 4 K/UL (ref 4.8–10.8)

## 2017-09-06 PROCEDURE — 770001 HCHG ROOM/CARE - MED/SURG/GYN PRIV*

## 2017-09-06 PROCEDURE — 700111 HCHG RX REV CODE 636 W/ 250 OVERRIDE (IP): Performed by: ORTHOPAEDIC SURGERY

## 2017-09-06 PROCEDURE — 99232 SBSQ HOSP IP/OBS MODERATE 35: CPT | Mod: GC | Performed by: INTERNAL MEDICINE

## 2017-09-06 PROCEDURE — 83615 LACTATE (LD) (LDH) ENZYME: CPT

## 2017-09-06 PROCEDURE — 97110 THERAPEUTIC EXERCISES: CPT

## 2017-09-06 PROCEDURE — 700105 HCHG RX REV CODE 258

## 2017-09-06 PROCEDURE — 36415 COLL VENOUS BLD VENIPUNCTURE: CPT

## 2017-09-06 PROCEDURE — A9270 NON-COVERED ITEM OR SERVICE: HCPCS | Performed by: INTERNAL MEDICINE

## 2017-09-06 PROCEDURE — 83010 ASSAY OF HAPTOGLOBIN QUANT: CPT

## 2017-09-06 PROCEDURE — 82247 BILIRUBIN TOTAL: CPT

## 2017-09-06 PROCEDURE — A9270 NON-COVERED ITEM OR SERVICE: HCPCS | Performed by: STUDENT IN AN ORGANIZED HEALTH CARE EDUCATION/TRAINING PROGRAM

## 2017-09-06 PROCEDURE — 700102 HCHG RX REV CODE 250 W/ 637 OVERRIDE(OP): Performed by: HOSPITALIST

## 2017-09-06 PROCEDURE — 85046 RETICYTE/HGB CONCENTRATE: CPT

## 2017-09-06 PROCEDURE — 82607 VITAMIN B-12: CPT

## 2017-09-06 PROCEDURE — 700102 HCHG RX REV CODE 250 W/ 637 OVERRIDE(OP): Performed by: STUDENT IN AN ORGANIZED HEALTH CARE EDUCATION/TRAINING PROGRAM

## 2017-09-06 PROCEDURE — 700102 HCHG RX REV CODE 250 W/ 637 OVERRIDE(OP): Performed by: ORTHOPAEDIC SURGERY

## 2017-09-06 PROCEDURE — 86901 BLOOD TYPING SEROLOGIC RH(D): CPT

## 2017-09-06 PROCEDURE — 86900 BLOOD TYPING SEROLOGIC ABO: CPT

## 2017-09-06 PROCEDURE — A9270 NON-COVERED ITEM OR SERVICE: HCPCS

## 2017-09-06 PROCEDURE — 700102 HCHG RX REV CODE 250 W/ 637 OVERRIDE(OP): Performed by: INTERNAL MEDICINE

## 2017-09-06 PROCEDURE — 97116 GAIT TRAINING THERAPY: CPT

## 2017-09-06 PROCEDURE — 82746 ASSAY OF FOLIC ACID SERUM: CPT

## 2017-09-06 PROCEDURE — 700102 HCHG RX REV CODE 250 W/ 637 OVERRIDE(OP)

## 2017-09-06 PROCEDURE — 700111 HCHG RX REV CODE 636 W/ 250 OVERRIDE (IP): Performed by: INTERNAL MEDICINE

## 2017-09-06 PROCEDURE — 86850 RBC ANTIBODY SCREEN: CPT

## 2017-09-06 PROCEDURE — 85014 HEMATOCRIT: CPT

## 2017-09-06 PROCEDURE — 82962 GLUCOSE BLOOD TEST: CPT | Mod: 91

## 2017-09-06 PROCEDURE — 85025 COMPLETE CBC W/AUTO DIFF WBC: CPT

## 2017-09-06 PROCEDURE — 97530 THERAPEUTIC ACTIVITIES: CPT

## 2017-09-06 PROCEDURE — 85018 HEMOGLOBIN: CPT

## 2017-09-06 PROCEDURE — 82248 BILIRUBIN DIRECT: CPT

## 2017-09-06 PROCEDURE — 80048 BASIC METABOLIC PNL TOTAL CA: CPT

## 2017-09-06 PROCEDURE — 700111 HCHG RX REV CODE 636 W/ 250 OVERRIDE (IP)

## 2017-09-06 PROCEDURE — 76937 US GUIDE VASCULAR ACCESS: CPT

## 2017-09-06 PROCEDURE — A9270 NON-COVERED ITEM OR SERVICE: HCPCS | Performed by: HOSPITALIST

## 2017-09-06 PROCEDURE — A9270 NON-COVERED ITEM OR SERVICE: HCPCS | Performed by: ORTHOPAEDIC SURGERY

## 2017-09-06 RX ORDER — SULFAMETHOXAZOLE AND TRIMETHOPRIM 800; 160 MG/1; MG/1
1 TABLET ORAL EVERY 12 HOURS
Status: DISCONTINUED | OUTPATIENT
Start: 2017-09-06 | End: 2017-09-08

## 2017-09-06 RX ORDER — SULFAMETHOXAZOLE AND TRIMETHOPRIM 800; 160 MG/1; MG/1
1 TABLET ORAL EVERY 12 HOURS
Status: CANCELLED | OUTPATIENT
Start: 2017-09-06

## 2017-09-06 RX ORDER — CEFAZOLIN SODIUM 2 G/100ML
2 INJECTION, SOLUTION INTRAVENOUS EVERY 8 HOURS
Status: DISCONTINUED | OUTPATIENT
Start: 2017-09-06 | End: 2017-09-09

## 2017-09-06 RX ADMIN — GABAPENTIN 300 MG: 300 CAPSULE ORAL at 09:46

## 2017-09-06 RX ADMIN — OXYCODONE HYDROCHLORIDE 15 MG: 5 TABLET ORAL at 14:19

## 2017-09-06 RX ADMIN — ENOXAPARIN SODIUM 40 MG: 100 INJECTION SUBCUTANEOUS at 09:47

## 2017-09-06 RX ADMIN — CEFAZOLIN SODIUM 2 G: 2 INJECTION, SOLUTION INTRAVENOUS at 18:57

## 2017-09-06 RX ADMIN — INSULIN LISPRO 1 UNITS: 100 INJECTION, SOLUTION INTRAVENOUS; SUBCUTANEOUS at 17:18

## 2017-09-06 RX ADMIN — OXYCODONE HYDROCHLORIDE 15 MG: 5 TABLET ORAL at 09:47

## 2017-09-06 RX ADMIN — OXYCODONE HYDROCHLORIDE 15 MG: 5 TABLET ORAL at 18:57

## 2017-09-06 RX ADMIN — SULFAMETHOXAZOLE AND TRIMETHOPRIM 1 TABLET: 800; 160 TABLET ORAL at 19:49

## 2017-09-06 RX ADMIN — VANCOMYCIN HYDROCHLORIDE 1100 MG: 100 INJECTION, POWDER, LYOPHILIZED, FOR SOLUTION INTRAVENOUS at 03:19

## 2017-09-06 RX ADMIN — STANDARDIZED SENNA CONCENTRATE AND DOCUSATE SODIUM 2 TABLET: 8.6; 5 TABLET, FILM COATED ORAL at 19:49

## 2017-09-06 RX ADMIN — POTASSIUM CHLORIDE 20 MEQ: 1500 TABLET, EXTENDED RELEASE ORAL at 19:50

## 2017-09-06 RX ADMIN — LOSARTAN POTASSIUM 50 MG: 50 TABLET, FILM COATED ORAL at 09:46

## 2017-09-06 RX ADMIN — GABAPENTIN 300 MG: 300 CAPSULE ORAL at 19:48

## 2017-09-06 RX ADMIN — OXYCODONE HYDROCHLORIDE 15 MG: 5 TABLET ORAL at 05:09

## 2017-09-06 RX ADMIN — MAGNESIUM HYDROXIDE 30 ML: 400 SUSPENSION ORAL at 19:52

## 2017-09-06 RX ADMIN — GABAPENTIN 300 MG: 300 CAPSULE ORAL at 14:20

## 2017-09-06 RX ADMIN — STANDARDIZED SENNA CONCENTRATE AND DOCUSATE SODIUM 2 TABLET: 8.6; 5 TABLET, FILM COATED ORAL at 09:46

## 2017-09-06 RX ADMIN — OMEPRAZOLE 20 MG: 20 CAPSULE, DELAYED RELEASE ORAL at 09:46

## 2017-09-06 RX ADMIN — CETIRIZINE HYDROCHLORIDE 10 MG: 10 TABLET, FILM COATED ORAL at 09:46

## 2017-09-06 RX ADMIN — OXYCODONE HYDROCHLORIDE 15 MG: 5 TABLET ORAL at 00:30

## 2017-09-06 RX ADMIN — POTASSIUM CHLORIDE 20 MEQ: 1500 TABLET, EXTENDED RELEASE ORAL at 09:00

## 2017-09-06 RX ADMIN — OXYCODONE HYDROCHLORIDE 15 MG: 5 TABLET ORAL at 23:16

## 2017-09-06 ASSESSMENT — COGNITIVE AND FUNCTIONAL STATUS - GENERAL
MOBILITY SCORE: 17
CLIMB 3 TO 5 STEPS WITH RAILING: A LOT
MOVING TO AND FROM BED TO CHAIR: A LITTLE
SUGGESTED CMS G CODE MODIFIER MOBILITY: CK
STANDING UP FROM CHAIR USING ARMS: A LITTLE
WALKING IN HOSPITAL ROOM: A LITTLE
MOVING FROM LYING ON BACK TO SITTING ON SIDE OF FLAT BED: A LITTLE
TURNING FROM BACK TO SIDE WHILE IN FLAT BAD: A LITTLE

## 2017-09-06 ASSESSMENT — ENCOUNTER SYMPTOMS
SHORTNESS OF BREATH: 0
FOCAL WEAKNESS: 0
FEVER: 0
NERVOUS/ANXIOUS: 0
SENSORY CHANGE: 0
CONSTIPATION: 0
PND: 0
EYE PAIN: 0
COUGH: 0
TREMORS: 0
MYALGIAS: 0
HEMOPTYSIS: 0
WEAKNESS: 1
CHILLS: 0
GASTROINTESTINAL NEGATIVE: 1
TINGLING: 0
SPUTUM PRODUCTION: 0
EYES NEGATIVE: 1
SPEECH CHANGE: 0
BLURRED VISION: 0
DOUBLE VISION: 0
ORTHOPNEA: 0
NAUSEA: 0
DIZZINESS: 0
DIARRHEA: 0
ABDOMINAL PAIN: 0
HEADACHES: 0
PALPITATIONS: 0
VOMITING: 0
WHEEZING: 0
SORE THROAT: 0
DEPRESSION: 0
DIAPHORESIS: 0
CARDIOVASCULAR NEGATIVE: 1
RESPIRATORY NEGATIVE: 1
FLANK PAIN: 0

## 2017-09-06 ASSESSMENT — GAIT ASSESSMENTS
ASSISTIVE DEVICE: FRONT WHEEL WALKER
GAIT LEVEL OF ASSIST: STAND BY ASSIST
DISTANCE (FEET): 150
DEVIATION: ANTALGIC

## 2017-09-06 ASSESSMENT — PAIN SCALES - GENERAL
PAINLEVEL_OUTOF10: 5
PAINLEVEL_OUTOF10: 4
PAINLEVEL_OUTOF10: 3
PAINLEVEL_OUTOF10: 6
PAINLEVEL_OUTOF10: 8
PAINLEVEL_OUTOF10: 6
PAINLEVEL_OUTOF10: 5
PAINLEVEL_OUTOF10: 8

## 2017-09-06 NOTE — PROGRESS NOTES
Infectious Disease Progress Note    Author: Eddie Max M.D. Date & Time created: 9/6/2017  4:20 PM    Interval History:  POD # 2 - L I&D right knee, poly exchange  Cefazolin/Vanco - Ancef dropped of abx list - only perioperative    Culture now with Stenotrophomonas and Strep reported as contaminant. No fevers. Knee pain controlled.      Labs Reviewed, Medications Reviewed, Fluids Reviewed and GI Nutrition Reviewed    Review of Systems:  Review of Systems   Constitutional: Negative for chills.   HENT: Negative.    Eyes: Negative.    Respiratory: Negative.    Cardiovascular: Negative.    Gastrointestinal: Negative.    Genitourinary: Negative.    Musculoskeletal: Negative for joint pain.        Right knee pain.   Neurological: Positive for weakness.       Physical Exam:  Physical Exam   Constitutional: She is oriented to person, place, and time. She appears well-developed and well-nourished.   Eyes: Pupils are equal, round, and reactive to light.   Cardiovascular: Normal rate and regular rhythm.    Pulmonary/Chest: Effort normal and breath sounds normal.   Abdominal: Soft. Bowel sounds are normal.   Musculoskeletal:   Right knee in surgical dressing with ACE wrap.  Good pedal pulses on right.   Neurological: She is alert and oriented to person, place, and time.       Labs:  Recent Results (from the past 24 hour(s))   ACCU-CHEK GLUCOSE    Collection Time: 09/05/17  4:42 PM   Result Value Ref Range    Glucose - Accu-Ck 182 (H) 65 - 99 mg/dL   ACCU-CHEK GLUCOSE    Collection Time: 09/05/17  8:36 PM   Result Value Ref Range    Glucose - Accu-Ck 150 (H) 65 - 99 mg/dL   ACCU-CHEK GLUCOSE    Collection Time: 09/06/17  5:10 AM   Result Value Ref Range    Glucose - Accu-Ck 121 (H) 65 - 99 mg/dL   RETICULOCYTES COUNT    Collection Time: 09/06/17  5:11 AM   Result Value Ref Range    Reticulocyte Count 3.3 (H) 0.8 - 2.1 %    Retic, Absolute 0.09 (H) 0.04 - 0.06 M/uL    Imm. Reticulocyte Fraction 25.4 (H) 9.3 - 17.4 %     Retic Hgb Equivalent 29.6 29.0 - 35.0 pg/cell   COD (ADULT)    Collection Time: 09/06/17  5:11 AM   Result Value Ref Range    ABO Grouping Only O     Rh Grouping Only POS     Antibody Screen-Cod NEG    CBC WITH DIFFERENTIAL    Collection Time: 09/06/17  5:13 AM   Result Value Ref Range    WBC 4.0 (L) 4.8 - 10.8 K/uL    RBC 2.64 (L) 4.20 - 5.40 M/uL    Hemoglobin 7.7 (L) 12.0 - 16.0 g/dL    Hematocrit 24.1 (L) 37.0 - 47.0 %    MCV 91.3 81.4 - 97.8 fL    MCH 29.2 27.0 - 33.0 pg    MCHC 32.0 (L) 33.6 - 35.0 g/dL    RDW 46.5 35.9 - 50.0 fL    Platelet Count 136 (L) 164 - 446 K/uL    MPV 9.8 9.0 - 12.9 fL    Neutrophils-Polys 50.60 44.00 - 72.00 %    Lymphocytes 31.10 22.00 - 41.00 %    Monocytes 11.40 0.00 - 13.40 %    Eosinophils 5.70 0.00 - 6.90 %    Basophils 0.70 0.00 - 1.80 %    Immature Granulocytes 0.50 0.00 - 0.90 %    Nucleated RBC 0.00 /100 WBC    Neutrophils (Absolute) 2.03 2.00 - 7.15 K/uL    Lymphs (Absolute) 1.25 1.00 - 4.80 K/uL    Monos (Absolute) 0.46 0.00 - 0.85 K/uL    Eos (Absolute) 0.23 0.00 - 0.51 K/uL    Baso (Absolute) 0.03 0.00 - 0.12 K/uL    Immature Granulocytes (abs) 0.02 0.00 - 0.11 K/uL    NRBC (Absolute) 0.00 K/uL   BASIC METABOLIC PANEL    Collection Time: 09/06/17  5:13 AM   Result Value Ref Range    Sodium 134 (L) 135 - 145 mmol/L    Potassium 3.7 3.6 - 5.5 mmol/L    Chloride 108 96 - 112 mmol/L    Co2 22 20 - 33 mmol/L    Glucose 115 (H) 65 - 99 mg/dL    Bun 12 8 - 22 mg/dL    Creatinine 0.68 0.50 - 1.40 mg/dL    Calcium 8.2 (L) 8.5 - 10.5 mg/dL    Anion Gap 4.0 0.0 - 11.9   ESTIMATED GFR    Collection Time: 09/06/17  5:13 AM   Result Value Ref Range    GFR If African American >60 >60 mL/min/1.73 m 2    GFR If Non African American >60 >60 mL/min/1.73 m 2   LDH    Collection Time: 09/06/17  7:48 AM   Result Value Ref Range    LDH Total 204 107 - 266 U/L   BILIRUBIN TOTAL    Collection Time: 09/06/17  7:48 AM   Result Value Ref Range    Total Bilirubin 0.9 0.1 - 1.5 mg/dL   BILIRUBIN  DIRECT    Collection Time: 09/06/17  7:48 AM   Result Value Ref Range    Direct Bilirubin 0.3 0.1 - 0.5 mg/dL   BILIRUBIN INDIRECT    Collection Time: 09/06/17  7:48 AM   Result Value Ref Range    Indirect Bilirubin 0.6 0.0 - 1.0 mg/dL   VITAMIN B12    Collection Time: 09/06/17  7:48 AM   Result Value Ref Range    Vitamin B12 -True Cobalamin >1500 (H) 211 - 911 pg/mL   FOLATE    Collection Time: 09/06/17  7:48 AM   Result Value Ref Range    Folate -Folic Acid >23.7 >4.0 ng/mL   ACCU-CHEK GLUCOSE    Collection Time: 09/06/17 11:01 AM   Result Value Ref Range    Glucose - Accu-Ck 124 (H) 65 - 99 mg/dL   HEMOGLOBIN AND HEMATOCRIT    Collection Time: 09/06/17  3:41 PM   Result Value Ref Range    Hemoglobin 8.4 (L) 12.0 - 16.0 g/dL    Hematocrit 25.7 (L) 37.0 - 47.0 %     Results     Procedure Component Value Units Date/Time    ANAEROBIC CULTURE [[id number]] Collected:  09/03/17 2327    Order Status:  Completed Specimen:  Tissue Updated:  09/06/17 1006     Significant Indicator NEG     Source TISS     Site Right Knee     Anaerobic Culture, Culture Res Culture in progress.    CULTURE TISSUE W/ GRM STAIN [[id number]] Collected:  09/03/17 2327    Order Status:  Completed Specimen:  Tissue Updated:  09/06/17 1006     Gram Stain Result --     Few WBCs.  No organisms seen.       Significant Indicator NEG     Source TISS     Site Right Knee     Tissue Culture No growth at 72 hours.    CULTURE WOUND W/ GRAM STAIN [[id number]]  (Abnormal)  (Susceptibility) Collected:  09/03/17 2324    Order Status:  Completed Specimen:  Wound Updated:  09/06/17 1005     Significant Indicator POS (POS)     Source WND     Site Right Knee Synovium     Culture Result Wound -- (A)     Growth noted after further incubation,see below for  organism identification.       Gram Stain Result --     Rare WBCs.  No organisms seen.       Culture Result Wound -- (A)     Stenotrophomonas (X.) maltophilia  Rare growth      Narrative:       CALL  [facility]  131 tel.  [phone],  CALLED  131 tel. [phone] 09/06/2017, 10:05, RB PERF. RESULTS CALLED TO:  [id number] RN    Culture & Susceptibility     STENOTROPHOMONAS (X.) MALTOPHILIA     Antibiotic Sensitivity Microscan Unit Status    Ceftazidime Resistant >16 mcg/mL Final    Levofloxacin Sensitive <=2 mcg/mL Final    Trimeth/Sulfa Sensitive <=2/38 mcg/mL Final                       ANAEROBIC CULTURE [[id number]] Collected:  09/03/17 2324    Order Status:  Completed Specimen:  Wound Updated:  09/06/17 1005     Significant Indicator NEG     Source WND     Site Right Knee Synovium     Anaerobic Culture, Culture Res Culture in progress.    Narrative:       CALL  [facility]  131 tel. [phone],  CALLED  131 tel. [phone] 09/06/2017, 10:05, RB PERF. RESULTS CALLED TO:  [id number] RN    FLUID CULTURE W/GRAM STAIN [[id number]] Collected:  09/03/17 1856    Order Status:  Completed Specimen:  Synovial from Synovial Fluid Updated:  09/06/17 0934     Gram Stain Result --     No organisms seen.  Few WBCs.       Significant Indicator NEG     Source SYNO     Site Right Knee     Culture Result Bdf No growth at 72 hours.    Narrative:       Right knee stat synovial fluid gram stain and culture  - call  results to Dr Russell [phone]    BLOOD CULTURE x2 [[id number]]  (Abnormal) Collected:  09/03/17 1918    Order Status:  Completed Specimen:  Blood from Peripheral Updated:  09/06/17 0823     Significant Indicator POS (POS)     Source BLD     Site PERIPHERAL     Blood Culture Growth detected by Bactec instrument. (A)     Blood Culture -- (A)     Viridans streptococcus - possible contaminant  Isolated from one bottle only, possible skin contaminant  please correlate with clinical condition.      Narrative:       CALL  [facility]  131 tel. [phone],  CALLED  131 tel. [phone] 09/04/2017, 07:23, RB PERF. RESULTS CALLED  TO:Lory [id number] Christopher  Per Hospital Policy: Only change Specimen Src: to \"Line\" if  specified by physician order.    BLOOD CULTURE [[id number]] Collected:  " "09/04/17 1204    Order Status:  Completed Specimen:  Blood from Peripheral Updated:  09/05/17 0935     Significant Indicator NEG     Source BLD     Site PERIPHERAL     Blood Culture --     No Growth    Note: Blood cultures are incubated for 5 days and  are monitored continuously.Positive blood cultures  are called to the RN and reported as soon as  they are identified.      Narrative:       Per Hospital Policy: Only change Specimen Src: to \"Line\" if  specified by physician order.    BLOOD CULTURE [[id number]] Collected:  09/04/17 1213    Order Status:  Completed Specimen:  Blood from Peripheral Updated:  09/05/17 0935     Significant Indicator NEG     Source BLD     Site PERIPHERAL     Blood Culture --     No Growth    Note: Blood cultures are incubated for 5 days and  are monitored continuously.Positive blood cultures  are called to the RN and reported as soon as  they are identified.      Narrative:       Per Hospital Policy: Only change Specimen Src: to \"Line\" if  specified by physician order.    BLOOD CULTURE x2 [[id number]] Collected:  09/03/17 2026    Order Status:  Completed Specimen:  Blood from Peripheral Updated:  09/04/17 0831     Significant Indicator NEG     Source BLD     Site PERIPHERAL     Blood Culture --     No Growth    Note: Blood cultures are incubated for 5 days and  are monitored continuously.Positive blood cultures  are called to the RN and reported as soon as  they are identified.      Narrative:       Per Hospital Policy: Only change Specimen Src: to \"Line\" if  specified by physician order.    GRAM STAIN [[id number]] Collected:  09/03/17 2324    Order Status:  Completed Specimen:  Wound Updated:  09/04/17 0755     Significant Indicator .     Source WND     Site Right Knee Synovium     Gram Stain Result --     Rare WBCs.  No organisms seen.      GRAM STAIN [[id number]] Collected:  09/03/17 2327    Order Status:  Completed Specimen:  Tissue Updated:  09/04/17 0755     Significant Indicator .     " Source TISS     Site Right Knee     Gram Stain Result --     Few WBCs.  No organisms seen.      URINALYSIS [[id number]] Collected:  17 0715    Order Status:  Completed Specimen:  Urine from Urine, Clean Catch Updated:  17 0753     Color Yellow     Character Clear     Specific Gravity 1.016     Ph 5.5     Glucose Negative mg/dL      Ketones Negative mg/dL      Protein Negative mg/dL      Bilirubin Negative     Urobilinogen, Urine 1.0     Nitrite Negative     Leukocyte Esterase Negative     Occult Blood Negative     Micro Urine Req see below     Comment: Microscopic examination not performed when specimen is clear  and chemically negative for protein, blood, leukocyte esterase  and nitrite.         Narrative:       Collected By:[id number] ALEX COMER    GRAM STAIN [[id number]] Collected:  17 1856    Order Status:  Completed Specimen:  Synovial Updated:  17     Significant Indicator .     Source SYNO     Site Right Knee     Gram Stain Result --     No organisms seen.  Few WBCs.      Narrative:       Right knee stat synovial fluid gram stain and culture  - call  results to Dr Russell [phone]        Hemodynamics:  Temp (24hrs), Av.8 °C (98.3 °F), Min:36.3 °C (97.3 °F), Max:37.3 °C (99.2 °F)  Temperature: 37.3 °C (99.2 °F)  Pulse  Av.2  Min: 56  Max: 79   Blood Pressure: (!) 98/66     PIV Group Left Forearm 22g Flexible Catheter (Active)   Line Secured Taped;Transparent 2017  8:30 AM   Site Condition / Description Assessed;Patent 2017  8:30 AM   Dressing Type / Description Clean;Dry;Intact 2017  8:30 AM   Dressing Status Observed 2017  8:30 AM   Saline Locked Yes 2017  8:30 AM   Infiltration Grading Used by [facility] 0 2017  8:30 AM   Phlebitis Scale (Used by [facility]) 0 2017  8:30 AM   Date Primary Tubing Changed 17  8:30 PM   Date Secondary Tubing Changed 17  8:30 PM   NEXT Primary Tubing Change  17  8:30  PM   NEXT Secondary Tubing Change  09/07/17 9/5/2017  8:30 PM     Wound:  Surgical Incision  Incision Right Knee (Active)   Wound Bed Other (comment) 9/6/2017  8:30 AM   Drainage  Minimal;Serosanguinous 9/6/2017  8:30 AM   Periwound Skin Normal 9/6/2017  8:30 AM   Daily - Wound Closure Not Assessed 9/6/2017  8:30 AM   Dressing Options Elastic Wrap 9/6/2017  8:30 AM   Dressing Status / Change Clean;Dry;Intact 9/6/2017  8:30 AM   Daily - Dressing Change Observed 9/6/2017  8:30 AM        Fluids:  Intake/Output       09/04/17 0700 - 09/05/17 0659 09/05/17 0700 - 09/06/17 0659 09/06/17 0700 - 09/07/17 0659      [id number] Total [id number] 3171-4879 Total 1294-8676 0300-5118 Total       Intake    P.O.  --  360 360  120  240 360  240  -- 240    P.O. -- 360 360 120 240 360 240 -- 240    Total Intake -- 360 360 120 240 360 240 -- 240       Output    Urine  --  -- --  --  -- --  --  -- --    Number of Times Voided -- 2 x 2 x 4 x 7 x 11 x 2 x -- 2 x    Drains  10  -- 10  --  10 10  --  -- --    Hemovac 1 10 -- 10 -- 10 10 -- -- --    Total Output 10 -- 10 -- 10 10 -- -- --       Net I/O     -10 360 350 120 230 350 240 -- 240           GI/Nutrition:  Orders Placed This Encounter   Procedures   • DIET ORDER     Standing Status:   Standing     Number of Occurrences:   1     Order Specific Question:   Diet:     Answer:   Diabetic [3]     Medications:  Current Facility-Administered Medications   Medication Last Dose   • potassium chloride SA (Kdur) tablet 20 mEq 20 mEq at 09/06/17 0900   • cetirizine (ZYRTEC) tablet 10 mg 10 mg at 09/06/17 0946   • fluticasone (FLONASE) nasal spray 50 mcg 50 mcg at 09/05/17 0822   • gabapentin (NEURONTIN) capsule 300 mg 300 mg at 09/06/17 1420   • zolpidem (AMBIEN) tablet 10 mg     • omeprazole (PRILOSEC) capsule 20 mg 20 mg at 09/06/17 0946   • acetaminophen (TYLENOL) tablet 650 mg     • senna-docusate (PERICOLACE or SENOKOT S) 8.6-50 MG per tablet 2 Tab 2 Tab at 09/06/17 0946    And   •  polyethylene glycol/lytes (MIRALAX) PACKET 1 Packet 1 Packet at 09/05/17 2033    And   • magnesium hydroxide (MILK OF MAGNESIA) suspension 30 mL      And   • bisacodyl (DULCOLAX) suppository 10 mg     • insulin lispro (HUMALOG) injection 1-6 Units Stopped at 09/05/17 2100   • glucose 4 g chewable tablet 16 g      And   • dextrose 50% (D50W) injection 25 mL     • oxycodone immediate release (ROXICODONE) tablet 10 mg      Or   • oxycodone immediate-release (ROXICODONE) tablet 15 mg 15 mg at 09/06/17 1419   • morphine (pf) 4 mg/ml injection 3 mg 3 mg at 09/05/17 2349   • losartan (COZAAR) tablet 50 mg 50 mg at 09/06/17 0946     Medical Decision Making, by Problem:  Active Hospital Problems    Diagnosis   • Infection [B99.9]   • 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CMS-[facility]) [T84.59XA, Z96.659]   • History of rheumatic fever [Z86.79]   • Dysuria [R30.0]   • Cirrhosis, alcoholic (CMS-[facility]) [K70.30]   • Normocytic anemia [D64.9]   • History of hypertension [Z86.79]   • Type 2 diabetes mellitus with neurologic complication (CMS-[facility]) [E11.49]   • Diabetic polyneuropathy associated with type 2 diabetes mellitus (CMS-[facility]) [E11.42]     1.  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  2.  Diabetes mellitus type 2, uncontrolled.  3.  Osteoarthritis.  4.  Asthma.  5.  History of hepatitis C, status post treatment with cirrhosis.    Plan:  Cultures from the knee with growth of Stenotrophomonas. Curious finding, but only finding. Strep from blood reported as contaminant by lab. Curious as well. Was not reproduced in repeat blood culture. Had been on therapy though.  She states the wound was clearly draining purulent fluid at first then was more bloody.  Must assume infection there. Will initiate Bactrim IV and continue ancef. Could consider Ertapenem for once a day dosing on an outpatient basis. Anticipate 6 weeks of therapy from poly exchange. PICC ok to place today. Will  order.    Dispo: Will be difficult. She lives with her dtr, but her dtr and son in law both work.  She is not certain she could arrange reliable transportation to [facility] for IV therapy.  ? If her PMD would be willing to manage her IV therapy as an outpatient.  ? If she would benefit from SNF vs LTAC. Medicaid FFS difficult for Outpatient abx approval. Needs further discussion with CM.     Discussed with pt and Resident Team.    > 40 min >50% of time spent in coordination of care/counseling and medication changes.    Dr Max

## 2017-09-06 NOTE — PROGRESS NOTES
"Post op day # 3    No New Complaints, pain well controlled    Blood pressure (!) 99/61, pulse 65, temperature 37.2 °C (99 °F), resp. rate 17, height 1.702 m (5' 7\"), weight 86.9 kg (191 lb 9.3 oz), last menstrual period 06/02/2008, SpO2 93 %.    Neurovascular intact  Wound Clean and Dry, hemovac removed  Minimal effusion  Blood cultures growing Strep  Tissue culture with ? Rare GNR    Recent Labs      09/03/17 1918 09/05/17 0221 09/06/17   0513   WBC  4.4*  6.6  4.0*   RBC  3.50*  3.04*  2.64*   HEMOGLOBIN  10.4*  8.8*  7.7*   HEMATOCRIT  30.9*  27.0*  24.1*   MCV  88.3  88.8  91.3   MCH  29.7  28.9  29.2   MCHC  33.7  32.6*  32.0*   RDW  44.8  45.6  46.5   PLATELETCT  185  178  136*   MPV  9.4  8.8*  9.8     Recent Labs      09/03/17 1918 09/05/17 0221 09/06/17   0513   SODIUM  136  136  134*   POTASSIUM  3.8  3.5*  3.7   CHLORIDE  105  107  108   CO2  22  24  22   GLUCOSE  102*  106*  115*   BUN  11  8  12   CREATININE  0.68  0.59  0.68   CALCIUM  9.3  8.4*  8.2*       Plan: Pt initially declined CPM for worries over cost.  Will reorder today.  Antibiotics per ID.  Hb 7.7, currently asymptomatic; may need transfusion if goes lower  "

## 2017-09-06 NOTE — PROGRESS NOTES
CPM knee machine has been applied and fitted to pt's R LE. Set at 0 to 50 degrees of flexion. Pt is tolerating well at this time. Pt has been informed to let us know when she is ready to be removed from cpm machine. If any assistance is needed with cpm please do not hesitate to contact ortho tech team at ext [phone].

## 2017-09-06 NOTE — THERAPY
"Pt agreeable to tx today. Pt presents w/functional limitations due to pain. Pt mobility slowly improving. Pt able to get in and OOB @ [facility] to manage R LE. Pt instucted on compensatory strategies to manage R LE. Pt able to mab further today and perfrom sit<-.>stands from various surfaces @ [facility]. Pt amb w/limited R knee flexion/ext and forward flexed posture. Pt required verbal cuing and postural facilitations to maintain upright posture. Pt educated on proper posture while amb and flexing and extending R knee w/swing through. Pt cont to amb w/limited ROM. Pt re-educated on importance of regaining ROM for efficent gait. Pt understood. Pt tolerated amb well. Pt able to perfrom LE therexs. Pt unable to perform SAQ however, due to quad weakness. Pt only able to flex knee to 79 degress and ext knee to 5 degrees. Pt educated on importance of LE therexs to improve ROM. Pt educated on importance of CPM. Pt agreeable, though worrisome of having to pay full price. Pt would benefit from further acute PT txs to progress towards goals and independence.     Physical Therapy Treatment completed.   Bed Mobility:  Supine to Sit: Minimal Assist  Transfers: Sit to Stand: Stand by Assist  Gait: Level Of Assist: Stand by Assist with Front-Wheel Walker       Plan of Care: Will benefit from Physical Therapy 4 times per week  Discharge Recommendations: Equipment: Will Continue to Assess for Equipment Needs. Post-acute therapy Discharge to home with outpatient or home health for additional skilled therapy services.     See \"Rehab Therapy-Acute\" Patient Summary Report for complete documentation.       "

## 2017-09-06 NOTE — NON-PROVIDER
Internal Medicine Medical Student Note    Name April Alicia     1960   Age/Sex 57 y.o. female   MRN [id number]   Code Status Full     After 5PM or if no immediate response to page, please call for cross-coverage  Attending/Team: Smiley See Patient List for primary contact information  Call [phone] to page after hours   1st Call - Day Intern (R1):    2nd Call - Day Sr. Resident (R2/R3):            Reason for interval visit  (Principal Problem)   R knee septic arthritis s/p TKR and I&D    Interval Problem Daily Status Update  (24 hours)   No acute overnight events. This morning patient reports feeling well with pain adequately controlled. When asked, patient claims one stool on Saturday with dark blood and then a normal stool on  with no blood. No BM since . Ambulating well with mild knee pain and no other complaints. She also claims that her urine is darker than normal. Denies CP, SOB, abd pain, F/C.    Ortho saw her this morning and removed the drain.  Blood cultures on 9/3 growing viridans strep isolated in one bottle only. Possible contaminant.  Wound culture on 9/3 growing rare Stenotrophomonas maltophilia.   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 Colonoscopy in 2016 showing 3mm polyp in cecum and 2 4-5mm polyps near splenic flexure. All were removed. The 2 polyps near splenic flexure revealed tubular adenoma in pathology.      Review of Systems   Constitutional: Negative for chills, fever and malaise/fatigue.   HENT: Negative for congestion, hearing loss, sore throat and tinnitus.    Eyes: Negative for blurred vision and double vision.   Respiratory: Negative for cough, sputum production, shortness of breath and wheezing.    Cardiovascular: Positive for leg swelling. Negative for chest pain, palpitations and orthopnea.   Gastrointestinal: Negative for abdominal pain,  constipation, diarrhea, nausea and vomiting.   Genitourinary: Positive for dysuria. Negative for flank pain, frequency and urgency.   Musculoskeletal: Positive for joint pain.   Neurological: Negative for dizziness, tingling, tremors, sensory change and headaches.   Psychiatric/Behavioral: Negative for depression. The patient is not nervous/anxious.              Physical Exam       Vitals:    09/05/17 1553 09/05/17 2000 09/06/17 0400 09/06/17 0721   BP: (!) 98/68 100/66 103/62 (!) 99/61   Pulse: 78 72 63 65   Resp: 17 16 18 17   Temp: 37.7 °C (99.8 °F) 36.7 °C (98.1 °F) 36.3 °C (97.3 °F) 37.2 °C (99 °F)   SpO2: 98% 96% 96% 93%   Weight:       Height:         Body mass index is 30.01 kg/m².    Oxygen Therapy:  Pulse Oximetry: 93 %, O2 (LPM): 0, O2 Delivery: None (Room Air)    Physical Exam   Constitutional: She is oriented to person, place, and time and well-developed, well-nourished, and in no distress.   HENT:   Head: Normocephalic and atraumatic.   Mouth/Throat: Oropharynx is clear and moist.   Eyes: Conjunctivae and EOM are normal. Pupils are equal, round, and reactive to light.   Neck: Neck supple. No thyromegaly present.   Cardiovascular: Normal rate, regular rhythm, S1 normal, S2 normal and intact distal pulses.  Exam reveals no gallop and no friction rub.    Murmur heard.   Systolic murmur is present with a grade of 3/6   Pulmonary/Chest: Effort normal and breath sounds normal. No respiratory distress. She has no wheezes. She has no rales.   Abdominal: Soft. Bowel sounds are normal. She exhibits no distension. There is no tenderness. There is no rebound and no guarding.   Musculoskeletal:        Right lower leg: She exhibits edema.   R knee surgical dressing s/p TKR   Lymphadenopathy:     She has no cervical adenopathy.   Neurological: She is alert and oriented to person, place, and time. No cranial nerve deficit. Coordination normal.   Skin: Skin is warm, dry and intact. Bruising noted. No cyanosis or  erythema. Nails show no clubbing.   R proximal thigh bruising   Psychiatric: Mood and affect normal.           Assessment/Plan   1. R knee septic arthritis s/p TKA and washout  - Synovial fluid intially w/ 20,000 WBCs, 99% polys  - Continue vancomycin and cefazolin per pharmacy recommendations and await ID recs.   - Hold propranolol and felodipine to prevent hypotension  - Hold metformin due to acute illness possibly requiring imaging  - Hold iron supplements due to acute infection  - Likely not infective endocarditis due to no change in echo since 2008.  - Oxycodone 10-15mg q4h PRN and morphine IV 3mg q3h PRN to control pain  - Blood culture shows possible contaminant with 1 of 2 bottles growing viridans strep  - Wound culture growing rare Stenotrophomonas maltophilia. Await ID recommendations.   - PT/OT evaluation recommend discharge to SNF and patient is agreeable     2. Normocytic Anemia  - Hgb 7.7 down from 8.8. Hct 24.1 down from 27, platelets 136 down from 178  - Fe 34, total bili 0.9, direct bili 0.3, indirect bili 0.6, , folate >23.7, B12 >1500, ferritin 133  - Fe deficiency vs anemia chronic disease (ACD) vs blood loss  - Blood loss 2/2 surgery possible. Reported 100mL blood loss during I&D. Last colonoscopy in 2015 and no abdominal symptoms makes GI bleed less likely.  - ACD possible due to h/o multiple chronic inflammatory disease and infection  - Fe deficiency possible due to acute infections and recurrent surgical interventions.   - Hold Fe due to acute infection  - Hold enoxaparin for DVT prophylaxis due to decreasing H&H. Use SCD prophylaxis  - Transfuse RBCs if Hgb < 7  - H&H q8hrs until stable  - Obtain FOBT    3. Dysuria  - Patient reports dysuria for 2 days  - UA returned clean with no signs of infection  - Denies flank pain and hematuria  - Currently on vanco and cefazolin for knee infection     4. DM2  - Discontinue metformin due to acute illness and possible need for imaging.  - HbA1c  5.5%  - Glucose under good control, POC in 120-150s and chem panel 115. Maintain current ISS therapy     5. Cirrhosis  - US in 7/2017 showed no change in heterogeneous hepatic architecture and dilated main portal vein consistent with cirrhosis and portal HTN.  - Patient claims she quit drinking alcohol 3-4 years ago and has normal LFTs, Tbili and albumin.

## 2017-09-06 NOTE — CARE PLAN
Received report from Day RN, assumed care at 1915; Pt A&Ox4, lying on bed sleeping; Pt pain is 6-8/10 on her Rt knee; CMS intact; Swelling, bruises noted; dressing CDI; w HV in place w minimal serosanguinous drainage; PIV Lt FA assessed and is patent, CDI, KVO; Pt is on RA w SaO2 >90%; Call light and personal possessions within reach, discussed safety interventions w pt; Reviewed recent notes, labs, diagnostics, MD orders; POC discussed        Safety  Goal: Will remain free from injury  Outcome: PROGRESSING AS EXPECTED  Place pt call light and belongings within reached, pt calls approriately; Instructed to call for assistance, Risk for fall education implemented; Non-skid socks on when OOB; Bed lowered and locked, upper siderails up. Hourly rounding in place      Problem: Venous Thromboembolism (VTW)/Deep Vein Thrombosis (DVT) Prevention:  Goal: Patient will participate in Venous Thrombosis (VTE)/Deep Vein Thrombosis (DVT)Prevention Measures  Outcome: PROGRESSING AS EXPECTED  SCD's On. On lovenox      Problem: Pain Management  Goal: Pain level will decrease to patient's comfort goal  Outcome: PROGRESSING AS EXPECTED  Medicated per MAR. Cold pack applied. Feet elevated. Pt verbalize adequate relief of pain, able to sleep      ABx: Vanco and Cefazolin

## 2017-09-06 NOTE — PROGRESS NOTES
Internal Medicine Interval Note    Name April Alicia     1960   Age/Sex 57 y.o. female   MRN [id number]   Code Status FULL     After 5PM or if no immediate response to page, please call for cross-coverage  Attending/Team: Nury See Patient List for primary contact information  Call [phone] to page    1st Call - Day Intern (R1):   Dr. Wen 2nd Call - Day Sr. Resident (R2/R3):   Dr. 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  Drain removed this morning.  Minimal pain today, well controlled w/ PRNs.   No fever/chills, CP, palp, SOB, N/V, diarrhea, abdominal or back pain.        Review of Systems   Constitutional: Negative for chills, diaphoresis, fever and malaise/fatigue.   HENT: Negative for congestion and sore throat.    Eyes: Negative for blurred vision, double vision and pain.   Respiratory: Negative for cough, hemoptysis, shortness of breath and wheezing.    Cardiovascular: Negative for chest pain, palpitations, orthopnea, leg swelling and PND.   Gastrointestinal: Negative for abdominal pain, constipation, diarrhea, nausea and vomiting.   Genitourinary: Positive for dysuria.   Musculoskeletal: Negative for myalgias.   Skin: Negative for itching and rash.   Neurological: Negative for dizziness, sensory change, speech change, focal weakness and headaches.       Consultants/Specialty  Orthopedics  Infectious Disease    Disposition  Inpatient    Quality Measures    Reviewed items::  Labs reviewed, Medications reviewed and Radiology images reviewed  Amaro catheter::  No Amaro  DVT prophylaxis pharmacological::  Enoxaparin (Lovenox)  DVT prophylaxis - mechanical:  SCDs (lovenox stopped d/t low Hb)          Physical Exam       Vitals:    17 2000 17 0400 17 0721 17 1127   BP: 100/66 103/62 (!) 99/61  107/65   Pulse: 72 63 65 64   Resp: 16 18 17 16   Temp: 36.7 °C (98.1 °F) 36.3 °C (97.3 °F) 37.2 °C (99 °F) 36.5 °C (97.7 °F)   SpO2: 96% 96% 93% 93%   Weight:       Height:         Body mass index is 30.01 kg/m².    Oxygen Therapy:  Pulse Oximetry: 93 %, O2 (LPM): 0, O2 Delivery: None (Room Air)    Physical Exam   Constitutional: She is oriented to person, place, and time.   WDWN, moderate distress   HENT:   Head: Normocephalic and atraumatic.   Mouth/Throat: No oropharyngeal exudate.   Eyes: EOM are normal. Pupils are equal, round, and reactive to light.   Neck: No tracheal deviation present.   Cardiovascular: Normal rate and regular rhythm.    Murmur (3/6 systolic decrescendo, S2 present) heard.  Pulmonary/Chest: Effort normal. No respiratory distress. She has no wheezes. She has no rales.   Abdominal: Soft. Bowel sounds are normal. There is no tenderness. There is no rebound and no guarding.   Musculoskeletal:   Right knee dressed, SONY in place w/ serosanguinous drainage. (+)edema distal to surgical site, unchanged from previous   Neurological: She is alert and oriented to person, place, and time. No cranial nerve deficit.         Lab Data Review:         9/4/2017  11:38 AM    Recent Labs      09/03/17 1918 09/05/17 0221 09/06/17   0513   SODIUM  136  136  134*   POTASSIUM  3.8  3.5*  3.7   CHLORIDE  105  107  108   CO2  22  24  22   BUN  11  8  12   CREATININE  0.68  0.59  0.68   CALCIUM  9.3  8.4*  8.2*       Recent Labs      09/03/17 1918 09/05/17 0221 09/06/17   0513  09/06/17   0748   ALTSGPT  11   --    --    --    ASTSGOT  20   --    --    --    ALKPHOSPHAT  79   --    --    --    TBILIRUBIN  1.3   --    --   0.9   DBILIRUBIN   --    --    --   0.3   GLUCOSE  102*  106*  115*   --        Recent Labs      09/03/17 1918 09/04/17   0547  09/04/17   0548  09/05/17   0221  09/06/17   0513   RBC  3.50*   --    --   3.04*  2.64*   HEMOGLOBIN  10.4*   --    --   8.8*  7.7*   HEMATOCRIT  30.9*   --     --   27.0*  24.1*   PLATELETCT  185   --    --   178  136*   PROTHROMBTM   --   16.3*   --    --    --    INR   --   1.27*   --    --    --    IRON   --    --   34*   --    --    FERRITIN   --   133.0   --    --    --    TOTIRONBC   --    --   197*   --    --        Recent Labs      09/03/17   1918  09/05/17   0221  09/06/17   0513  09/06/17   0748   WBC  4.4*  6.6  4.0*   --    NEUTSPOLYS  65.20  61.60  50.60   --    LYMPHOCYTES  23.00  25.00  31.10   --    MONOCYTES  7.10  10.00  11.40   --    EOSINOPHILS  3.60  2.00  5.70   --    BASOPHILS  0.90  1.10  0.70   --    ASTSGOT  20   --    --    --    ALTSGPT  11   --    --    --    ALKPHOSPHAT  79   --    --    --    TBILIRUBIN  1.3   --    --   0.9           Assessment/Plan     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CMS-[facility])- (present on admission)   Assessment & Plan    TKR on 8/21, developed redness/swelling/pain x48hrs.   s/p I&D with poly exchange 9/4  Tmax o/n of 100.1, currently afebrile. No subj. chills or myalgias.  Synovial fluid w/ 20,000 WBCs, 99% polys  Wound Cx grew Stenotrophomonas maltophilia; ID is following, appreciate recs.  Plan:  Currently on Cefazolin and vanc; ID following.   Will likely need 4-6 of abx; PICC ordered w/ negative BCx 48hrs.  Holding iron supplementation and antihypertensives in the setting of infection.   Pain adequately controlled w/ Fabi 10-15mg q4h, with IV morphine 3mg q3h PRN.         Normocytic anemia- (present on admission)   Assessment & Plan    Baseline Hb 12-13. Has been trending down from 10.4 >>8.8 >>7.7. Repeat this afternoon back up to 8.4.  Normocytic, likely anemia of chronic inflammatory disease, ? Iron deficiency.  Normocytic, normal RDW. Corrected retic count normal.   No obvious active bleed, no SOB, CP, abd/back pain, thigh pain or P/E changes. Blood loss from surgery was minimal, drain d/c'd from wound this am.  Plan:  - type and cross  - Hb q8h, transfuse for <7.0  - fobt. Stopped lovenox ppx, SCD in place on  the left.        History of rheumatic fever- (present on admission)   Assessment & Plan    H/o rheumatic fever at age 26.    3/6 systolic murmur on exam, likely MR.   Echo shows no changes from 2008.  BCx 9/3 NGTD   Infective endocarditis is unlikely.        Type 2 diabetes mellitus with neurologic complication (CMS-HCC)- (present on admission)   Assessment & Plan    Holding metformin to avoid metabolic acidosis.  Diabetic diet, ISS  A1c 5.5%        History of hypertension- (present on admission)   Assessment & Plan    As above, holding antihypertensives (losartan, felodipine) in the setting of infection.        Cirrhosis, alcoholic (CMS-HCC)- (present on admission)   Assessment & Plan    Previous U/S showed findings of cirrhosis w/ portal HTN; pt quit drinking 3 years ago. Normal LFTs, Tbili, Alb, plt. Amanda class A.  Propranolol that was started for variceal ppx was d/c'd in the setting of infection.

## 2017-09-07 ENCOUNTER — APPOINTMENT (OUTPATIENT)
Dept: RADIOLOGY | Facility: MEDICAL CENTER | Age: 57
DRG: 464 | End: 2017-09-07
Attending: HOSPITALIST
Payer: MEDICAID

## 2017-09-07 LAB
ANION GAP SERPL CALC-SCNC: 4 MMOL/L (ref 0–11.9)
BASOPHILS # BLD AUTO: 1.1 % (ref 0–1.8)
BASOPHILS # BLD: 0.04 K/UL (ref 0–0.12)
BUN SERPL-MCNC: 12 MG/DL (ref 8–22)
CALCIUM SERPL-MCNC: 8.5 MG/DL (ref 8.5–10.5)
CHLORIDE SERPL-SCNC: 108 MMOL/L (ref 96–112)
CO2 SERPL-SCNC: 23 MMOL/L (ref 20–33)
CREAT SERPL-MCNC: 0.62 MG/DL (ref 0.5–1.4)
EOSINOPHIL # BLD AUTO: 0.22 K/UL (ref 0–0.51)
EOSINOPHIL NFR BLD: 6.1 % (ref 0–6.9)
ERYTHROCYTE [DISTWIDTH] IN BLOOD BY AUTOMATED COUNT: 46.2 FL (ref 35.9–50)
GFR SERPL CREATININE-BSD FRML MDRD: >60 ML/MIN/1.73 M 2
GLUCOSE BLD-MCNC: 100 MG/DL (ref 65–99)
GLUCOSE BLD-MCNC: 127 MG/DL (ref 65–99)
GLUCOSE BLD-MCNC: 147 MG/DL (ref 65–99)
GLUCOSE SERPL-MCNC: 104 MG/DL (ref 65–99)
HAPTOGLOB SERPL-MCNC: 76 MG/DL (ref 30–200)
HCT VFR BLD AUTO: 23.9 % (ref 37–47)
HGB BLD-MCNC: 7.6 G/DL (ref 12–16)
IMM GRANULOCYTES # BLD AUTO: 0.02 K/UL (ref 0–0.11)
IMM GRANULOCYTES NFR BLD AUTO: 0.6 % (ref 0–0.9)
LYMPHOCYTES # BLD AUTO: 1.15 K/UL (ref 1–4.8)
LYMPHOCYTES NFR BLD: 31.9 % (ref 22–41)
MCH RBC QN AUTO: 29.1 PG (ref 27–33)
MCHC RBC AUTO-ENTMCNC: 31.8 G/DL (ref 33.6–35)
MCV RBC AUTO: 91.6 FL (ref 81.4–97.8)
MONOCYTES # BLD AUTO: 0.41 K/UL (ref 0–0.85)
MONOCYTES NFR BLD AUTO: 11.4 % (ref 0–13.4)
NEUTROPHILS # BLD AUTO: 1.77 K/UL (ref 2–7.15)
NEUTROPHILS NFR BLD: 48.9 % (ref 44–72)
NRBC # BLD AUTO: 0 K/UL
NRBC BLD AUTO-RTO: 0 /100 WBC
PLATELET # BLD AUTO: 148 K/UL (ref 164–446)
PMV BLD AUTO: 9.5 FL (ref 9–12.9)
POTASSIUM SERPL-SCNC: 4.3 MMOL/L (ref 3.6–5.5)
RBC # BLD AUTO: 2.61 M/UL (ref 4.2–5.4)
SODIUM SERPL-SCNC: 135 MMOL/L (ref 135–145)
WBC # BLD AUTO: 3.6 K/UL (ref 4.8–10.8)

## 2017-09-07 PROCEDURE — 700111 HCHG RX REV CODE 636 W/ 250 OVERRIDE (IP): Performed by: INTERNAL MEDICINE

## 2017-09-07 PROCEDURE — 700102 HCHG RX REV CODE 250 W/ 637 OVERRIDE(OP): Performed by: STUDENT IN AN ORGANIZED HEALTH CARE EDUCATION/TRAINING PROGRAM

## 2017-09-07 PROCEDURE — 700102 HCHG RX REV CODE 250 W/ 637 OVERRIDE(OP): Performed by: HOSPITALIST

## 2017-09-07 PROCEDURE — 700102 HCHG RX REV CODE 250 W/ 637 OVERRIDE(OP): Performed by: INTERNAL MEDICINE

## 2017-09-07 PROCEDURE — 80048 BASIC METABOLIC PNL TOTAL CA: CPT

## 2017-09-07 PROCEDURE — 97535 SELF CARE MNGMENT TRAINING: CPT

## 2017-09-07 PROCEDURE — 770001 HCHG ROOM/CARE - MED/SURG/GYN PRIV*

## 2017-09-07 PROCEDURE — A9270 NON-COVERED ITEM OR SERVICE: HCPCS | Performed by: STUDENT IN AN ORGANIZED HEALTH CARE EDUCATION/TRAINING PROGRAM

## 2017-09-07 PROCEDURE — A9270 NON-COVERED ITEM OR SERVICE: HCPCS | Performed by: ORTHOPAEDIC SURGERY

## 2017-09-07 PROCEDURE — 74000 DX-ABDOMEN-1 VIEW: CPT

## 2017-09-07 PROCEDURE — A9270 NON-COVERED ITEM OR SERVICE: HCPCS | Performed by: INTERNAL MEDICINE

## 2017-09-07 PROCEDURE — 700102 HCHG RX REV CODE 250 W/ 637 OVERRIDE(OP): Performed by: ORTHOPAEDIC SURGERY

## 2017-09-07 PROCEDURE — 97116 GAIT TRAINING THERAPY: CPT

## 2017-09-07 PROCEDURE — 82962 GLUCOSE BLOOD TEST: CPT

## 2017-09-07 PROCEDURE — 99232 SBSQ HOSP IP/OBS MODERATE 35: CPT | Mod: GC | Performed by: INTERNAL MEDICINE

## 2017-09-07 PROCEDURE — A9270 NON-COVERED ITEM OR SERVICE: HCPCS

## 2017-09-07 PROCEDURE — 700111 HCHG RX REV CODE 636 W/ 250 OVERRIDE (IP): Performed by: NURSE PRACTITIONER

## 2017-09-07 PROCEDURE — A9270 NON-COVERED ITEM OR SERVICE: HCPCS | Performed by: HOSPITALIST

## 2017-09-07 PROCEDURE — 85025 COMPLETE CBC W/AUTO DIFF WBC: CPT

## 2017-09-07 PROCEDURE — 700102 HCHG RX REV CODE 250 W/ 637 OVERRIDE(OP)

## 2017-09-07 RX ORDER — LACTULOSE 20 G/30ML
30 SOLUTION ORAL ONCE
Status: COMPLETED | OUTPATIENT
Start: 2017-09-07 | End: 2017-09-07

## 2017-09-07 RX ORDER — FERROUS SULFATE 325(65) MG
325 TABLET ORAL DAILY
Status: DISCONTINUED | OUTPATIENT
Start: 2017-09-07 | End: 2017-09-07

## 2017-09-07 RX ORDER — ENEMA 19; 7 G/133ML; G/133ML
1 ENEMA RECTAL ONCE
Status: ACTIVE | OUTPATIENT
Start: 2017-09-07 | End: 2017-09-08

## 2017-09-07 RX ORDER — FERROUS SULFATE 325(65) MG
325 TABLET ORAL 2 TIMES DAILY WITH MEALS
Status: DISCONTINUED | OUTPATIENT
Start: 2017-09-07 | End: 2017-09-12 | Stop reason: HOSPADM

## 2017-09-07 RX ADMIN — OXYCODONE HYDROCHLORIDE 15 MG: 5 TABLET ORAL at 06:24

## 2017-09-07 RX ADMIN — STANDARDIZED SENNA CONCENTRATE AND DOCUSATE SODIUM 2 TABLET: 8.6; 5 TABLET, FILM COATED ORAL at 19:48

## 2017-09-07 RX ADMIN — GABAPENTIN 300 MG: 300 CAPSULE ORAL at 15:36

## 2017-09-07 RX ADMIN — LACTULOSE 30 ML: 20 SOLUTION ORAL at 17:45

## 2017-09-07 RX ADMIN — CEFAZOLIN SODIUM 2 G: 2 INJECTION, SOLUTION INTRAVENOUS at 18:09

## 2017-09-07 RX ADMIN — CETIRIZINE HYDROCHLORIDE 10 MG: 10 TABLET, FILM COATED ORAL at 08:21

## 2017-09-07 RX ADMIN — ALTEPLASE 2 MG: 2.2 INJECTION, POWDER, LYOPHILIZED, FOR SOLUTION INTRAVENOUS at 22:28

## 2017-09-07 RX ADMIN — OXYCODONE HYDROCHLORIDE 10 MG: 10 TABLET ORAL at 11:01

## 2017-09-07 RX ADMIN — CEFAZOLIN SODIUM 2 G: 2 INJECTION, SOLUTION INTRAVENOUS at 11:01

## 2017-09-07 RX ADMIN — ACETAMINOPHEN 650 MG: 325 TABLET, FILM COATED ORAL at 11:01

## 2017-09-07 RX ADMIN — OXYCODONE HYDROCHLORIDE 15 MG: 5 TABLET ORAL at 15:36

## 2017-09-07 RX ADMIN — Medication 325 MG: at 18:09

## 2017-09-07 RX ADMIN — SULFAMETHOXAZOLE AND TRIMETHOPRIM 1 TABLET: 800; 160 TABLET ORAL at 08:21

## 2017-09-07 RX ADMIN — LOSARTAN POTASSIUM 50 MG: 50 TABLET, FILM COATED ORAL at 08:20

## 2017-09-07 RX ADMIN — POTASSIUM CHLORIDE 20 MEQ: 1500 TABLET, EXTENDED RELEASE ORAL at 19:48

## 2017-09-07 RX ADMIN — GABAPENTIN 300 MG: 300 CAPSULE ORAL at 19:49

## 2017-09-07 RX ADMIN — BISACODYL 10 MG: 10 SUPPOSITORY RECTAL at 11:01

## 2017-09-07 RX ADMIN — OXYCODONE HYDROCHLORIDE 10 MG: 10 TABLET ORAL at 19:47

## 2017-09-07 RX ADMIN — STANDARDIZED SENNA CONCENTRATE AND DOCUSATE SODIUM 2 TABLET: 8.6; 5 TABLET, FILM COATED ORAL at 08:21

## 2017-09-07 RX ADMIN — SULFAMETHOXAZOLE AND TRIMETHOPRIM 1 TABLET: 800; 160 TABLET ORAL at 19:48

## 2017-09-07 RX ADMIN — CEFAZOLIN SODIUM 2 G: 2 INJECTION, SOLUTION INTRAVENOUS at 00:05

## 2017-09-07 RX ADMIN — POTASSIUM CHLORIDE 20 MEQ: 1500 TABLET, EXTENDED RELEASE ORAL at 08:21

## 2017-09-07 RX ADMIN — OMEPRAZOLE 20 MG: 20 CAPSULE, DELAYED RELEASE ORAL at 08:21

## 2017-09-07 RX ADMIN — GABAPENTIN 300 MG: 300 CAPSULE ORAL at 08:20

## 2017-09-07 RX ADMIN — OXYCODONE HYDROCHLORIDE 15 MG: 5 TABLET ORAL at 02:50

## 2017-09-07 ASSESSMENT — ENCOUNTER SYMPTOMS
TREMORS: 0
DIZZINESS: 0
SENSORY CHANGE: 0
EYE PAIN: 0
NERVOUS/ANXIOUS: 0
WEAKNESS: 0
MYALGIAS: 0
NAUSEA: 0
CHILLS: 0
SHORTNESS OF BREATH: 0
PND: 0
WEIGHT LOSS: 0
FOCAL WEAKNESS: 0
WEAKNESS: 1
PALPITATIONS: 0
EYES NEGATIVE: 1
TINGLING: 0
CONSTIPATION: 0
CARDIOVASCULAR NEGATIVE: 1
HEMOPTYSIS: 0
ORTHOPNEA: 0
COUGH: 0
SORE THROAT: 0
BLURRED VISION: 0
VOMITING: 0
DOUBLE VISION: 0
GASTROINTESTINAL NEGATIVE: 1
CONSTIPATION: 1
SPEECH CHANGE: 0
HEADACHES: 0
SPUTUM PRODUCTION: 0
ABDOMINAL PAIN: 0
DIARRHEA: 0
FEVER: 0
RESPIRATORY NEGATIVE: 1
DIAPHORESIS: 0
WHEEZING: 0
DEPRESSION: 0

## 2017-09-07 ASSESSMENT — COGNITIVE AND FUNCTIONAL STATUS - GENERAL
MOVING TO AND FROM BED TO CHAIR: A LITTLE
TURNING FROM BACK TO SIDE WHILE IN FLAT BAD: A LITTLE
SUGGESTED CMS G CODE MODIFIER MOBILITY: CK
CLIMB 3 TO 5 STEPS WITH RAILING: A LITTLE
MOVING FROM LYING ON BACK TO SITTING ON SIDE OF FLAT BED: A LITTLE
MOBILITY SCORE: 18
WALKING IN HOSPITAL ROOM: A LITTLE
STANDING UP FROM CHAIR USING ARMS: A LITTLE

## 2017-09-07 ASSESSMENT — GAIT ASSESSMENTS
ASSISTIVE DEVICE: FRONT WHEEL WALKER
DISTANCE (FEET): 200
GAIT LEVEL OF ASSIST: STAND BY ASSIST
DEVIATION: ANTALGIC;STEP TO

## 2017-09-07 ASSESSMENT — PAIN SCALES - GENERAL
PAINLEVEL_OUTOF10: 5
PAINLEVEL_OUTOF10: 8
PAINLEVEL_OUTOF10: 4
PAINLEVEL_OUTOF10: 6

## 2017-09-07 NOTE — PROGRESS NOTES
"Post op day # 4    No New Complaints , pain stable    Blood pressure (!) 98/66, pulse 77, temperature 37.9 °C (100.2 °F), resp. rate 17, height 1.702 m (5' 7\"), weight 86.9 kg (191 lb 9.3 oz), last menstrual period 06/02/2008, SpO2 95 %.    Neurovascular intact  Wound Clean and Dry  PICC line now in place  Culture with rare growth Stenotropomonas  Hb stable from yesterday, remains asymptomatic    Recent Labs      09/05/17 0221 09/06/17 0513 09/06/17   1541  09/07/17   0300   WBC  6.6  4.0*   --   3.6*   RBC  3.04*  2.64*   --   2.61*   HEMOGLOBIN  8.8*  7.7*  8.4*  7.6*   HEMATOCRIT  27.0*  24.1*  25.7*  23.9*   MCV  88.8  91.3   --   91.6   MCH  28.9  29.2   --   29.1   MCHC  32.6*  32.0*   --   31.8*   RDW  45.6  46.5   --   46.2   PLATELETCT  178  136*   --   148*   MPV  8.8*  9.8   --   9.5     Recent Labs      09/05/17 0221 09/06/17   0513 09/07/17   0300   SODIUM  136  134*  135   POTASSIUM  3.5*  3.7  4.3   CHLORIDE  107  108  108   CO2  24  22  23   GLUCOSE  106*  115*  104*   BUN  8  12  12   CREATININE  0.59  0.68  0.62   CALCIUM  8.4*  8.2*  8.5       Plan: Continue with ROM right knee, abs per ID.    "

## 2017-09-07 NOTE — PROCEDURES
PICC Insertion Procedure Note    Consents confirmed, vessel patency confirmed with ultrasound. Risks and benefits of procedure explained to patient/family and education regarding central line associated bloodstream infections provided. Questions answered.     PICC placed in LUE per MD order with ultrasound guidance. 4 Fr, single lumen PICC placed in basilic vein after 1 attempt(s). 3 cc's of 1% lidocaine injected intradermally, 21 gauge microintroducer needle and modified Seldinger technique used. 49 cm total catheter length, 3 cm external measurement inserted with good blood return. Each lumen flushed without resistance with 10 mL 0.9% normal saline. PICC line secured with Biopatch and Tegaderm.    PICC tip location in the SVC confirmed by ECG technology. Pt tolerated procedure well.  Patient condition relayed to unit RN or ordering physician via this post procedure note in the EMR.     [employer] Power PICC ref # [id number], Lot # [id number]

## 2017-09-07 NOTE — PROGRESS NOTES
Removed cpm machine per patient request. Will reapply in the morning.  Any questions or concerns please call [place] at [phone]

## 2017-09-07 NOTE — PROGRESS NOTES
Internal Medicine Interval Note    Name April Alicia     1960   Age/Sex 57 y.o. female   MRN [id number]   Code Status FULL     After 5PM or if no immediate response to page, please call for cross-coverage  Attending/Team: Nury See Patient List for primary contact information  Call [phone] to page    1st Call - Day Intern (R1):   Dr. Wen 2nd Call - Day Sr. Resident (R2/R3):   Dr. 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  Continuing Cefazolin, and Bactrim    Review of Systems   Constitutional: Positive for malaise/fatigue. Negative for chills, diaphoresis and fever.   HENT: Negative for congestion and sore throat (improved).    Eyes: Negative for blurred vision, double vision and pain.   Respiratory: Negative for cough, hemoptysis, shortness of breath and wheezing.    Cardiovascular: Negative for chest pain, palpitations, orthopnea, leg swelling and PND.   Gastrointestinal: Negative for abdominal pain, constipation, diarrhea, nausea and vomiting.   Genitourinary: Negative for dysuria.   Musculoskeletal: Negative for myalgias.   Skin: Negative for rash.   Neurological: Negative for dizziness, sensory change, speech change, focal weakness and headaches.     Consultants/Specialty  Orthopedics  Infectious Disease    Disposition  Inpatient    Quality Measures    Reviewed items::  Labs reviewed, Medications reviewed and Radiology images reviewed  Amaro catheter::  No Amaro  DVT: Held for unexplained anemia   DVT prophylaxis - mechanical:  SCDs          Physical Exam       Vitals:    17 0400 17 0800 17 1100 17 1200   BP: 102/59 (!)  98/66 107/68    Pulse: 64 77 95    Resp: 16 17 18    Temp: 36.2 °C (97.1 °F) 37.9 °C (100.2 °F) (!) 38.1 °C (100.5 °F) 37.5 °C (99.5 °F)   SpO2:  95% 93%    Weight:       Height:         Body mass index is 30.01 kg/m².    Oxygen Therapy:  Pulse Oximetry: 93 %, O2 (LPM): 0, O2 Delivery: None (Room Air)    Physical Exam   Constitutional: She is oriented to person, place, and time.   WDWN, moderate distress   HENT:   Head: Normocephalic and atraumatic.   Mouth/Throat: No oropharyngeal exudate.   Eyes: EOM are normal. Pupils are equal, round, and reactive to light.   Neck: No tracheal deviation present.   Cardiovascular: Normal rate and regular rhythm.    Murmur (3/6 systolic decrescendo, S2 present) heard.  Pulmonary/Chest: Effort normal. No respiratory distress. She has no wheezes. She has no rales.   Abdominal: Soft. Bowel sounds are normal. There is no tenderness. There is no rebound and no guarding.   Musculoskeletal:   Right knee dressed, SONY in place w/ serosanguinous drainage. (+)edema distal to surgical site, unchanged from previous   Neurological: She is alert and oriented to person, place, and time. No cranial nerve deficit.         Lab Data Review:         9/4/2017  11:38 AM    Recent Labs      09/05/17 0221 09/06/17 0513  09/07/17   0300   SODIUM  136  134*  135   POTASSIUM  3.5*  3.7  4.3   CHLORIDE  107  108  108   CO2  24  22  23   BUN  8  12  12   CREATININE  0.59  0.68  0.62   CALCIUM  8.4*  8.2*  8.5       Recent Labs      09/05/17 0221 09/06/17   0513  09/06/17   0748  09/07/17   0300   TBILIRUBIN   --    --   0.9   --    DBILIRUBIN   --    --   0.3   --    GLUCOSE  106*  115*   --   104*       Recent Labs      09/05/17 0221 09/06/17   0513  09/06/17   1541  09/07/17   0300   RBC  3.04*  2.64*   --   2.61*   HEMOGLOBIN  8.8*  7.7*  8.4*  7.6*   HEMATOCRIT  27.0*  24.1*  25.7*  23.9*   PLATELETCT  178  136*   --   148*       Recent Labs      09/05/17 0221 09/06/17 0513  09/06/17   0748   09/07/17   0300   WBC  6.6  4.0*   --   3.6*   NEUTSPOLYS  61.60  50.60   --   48.90   LYMPHOCYTES  25.00  31.10   --   31.90   MONOCYTES  10.00  11.40   --   11.40   EOSINOPHILS  2.00  5.70   --   6.10   BASOPHILS  1.10  0.70   --   1.10   TBILIRUBIN   --    --   0.9   --            Assessment/Plan     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CMS-HCC)- (present on admission)   Assessment & Plan    TKR on 8/21, developed redness/swelling/pain x48hrs.   s/p I&D with poly exchange 9/4  Tmax o/n of 100.1, currently afebrile. No subj. chills or myalgias.  Synovial fluid w/ 20,000 WBCs, 99% polys  Wound Cx grew Stenotrophomonas maltophilia; ID is following, appreciate recs.  Plan:  Currently on Cefazolin and DS Bactrim; ID following.   Will likely need 6 of abx; PICC placed  Restarted iron supplementation 9/7/2017  Pain adequately controlled w/ Fabi 10-15mg q4h, with IV morphine 3mg q3h PRN.         Normocytic anemia- (present on admission)   Assessment & Plan    Baseline Hb 12-13. Has been trending down from 10.4 >>8.8 >>7.7 >>8.4   Normocytic, likely anemia of chronic inflammatory disease, ? Iron deficiency.  Normocytic, normal RDW. Corrected retic count normal.   No obvious active bleed, no SOB, CP, abd/back pain, thigh pain or P/E changes. Blood loss from surgery was minimal, drain d/c'd from wound this am.  Plan:  - type and cross  - Hb q8h, transfuse for <7.0  - fobt still pending, patient is constipated.   - Stopped lovenox ppx, SCD in place on the left.  - Restarted patient 's home PO iron 9/7/2017        History of rheumatic fever- (present on admission)   Assessment & Plan    H/o rheumatic fever at age 26.    3/6 systolic murmur on exam, likely MR.   Echo shows no changes from 2008.  BCx 9/3 NGTD    Infective endocarditis is unlikely.        Type 2 diabetes mellitus with neurologic complication (CMS-HCC)- (present on admission)   Assessment & Plan    Holding metformin to avoid metabolic acidosis.  Diabetic diet, ISS    A1c 5.5%        History of hypertension- (present on admission)   Assessment & Plan    As above, holding antihypertensives (losartan, felodipine) in the setting of infection.         Cirrhosis, alcoholic (CMS-HCC)- (present on admission)   Assessment & Plan    Previous U/S showed findings of cirrhosis w/ portal HTN; pt quit drinking 3 years ago. Normal LFTs, Tbili, Alb, plt. Amanda class A.  Propranolol that was started for variceal ppx was d/c'd in the setting of infection.

## 2017-09-07 NOTE — CARE PLAN
Received report from Day RN, assumed care at 1915; Pt A&Ox4, lying on bed sleeping; Pt pain is 5-6/10 on her Rt knee; CMS intact; Swelling, bruises noted; dressing CDI; PIV Lt FA  And PICC on LUE assessed and is patent, CDI, KVO; Pt is on RA w SaO2 >90%; Call light and personal possessions within reach, discussed safety interventions w pt; Reviewed recent notes, labs, diagnostics, MD orders; POC discussed        Safety  Goal: Will remain free from injury  Outcome: PROGRESSING AS EXPECTED  Place pt call light and belongings within reached, pt calls approriately; Instructed to call for assistance, Risk for fall education implemented; Non-skid socks on when OOB; Bed lowered and locked, upper siderails up. Hourly rounding in place      Problem: Venous Thromboembolism (VTW)/Deep Vein Thrombosis (DVT) Prevention:  Goal: Patient will participate in Venous Thrombosis (VTE)/Deep Vein Thrombosis (DVT)Prevention Measures  Outcome: PROGRESSING AS EXPECTED  SCD's On. On lovenox. Pt ambulating t bathroom; on CPM, off requested during night      Problem: Pain Management  Goal: Pain level will decrease to patient's comfort goal  Outcome: PROGRESSING AS EXPECTED  Medicated per MAR. Cold pack applied. Feet elevated. Pt verbalize adequate relief of pain, able to sleep      ABx: Cefazolin and Bactrim    D/C: SNF vs LTAC?

## 2017-09-07 NOTE — NON-PROVIDER
Internal Medicine Medical Student Note    Name April Alicia     1960   Age/Sex 57 y.o. female   MRN [id number]   Code Status Full     After 5PM or if no immediate response to page, please call for cross-coverage  Attending/Team: Smiley See Patient List for primary contact information  Call [phone] to page after hours   1st Call - Day Intern (R1):    2nd Call - Day Sr. Resident (R2/R3):            Reason for interval visit  (Principal Problem)   R knee septic arthritis s/p TKR    Interval Problem Daily Status Update  (24 hours)   No acute overnight events. Patient is feeling well and sitting out of bed on visit. Claims pain is controlled adequately and is able to ambulate. She would like to be discharged to a SNF for rehab. Discharge pending BM. Last BM was .  to discuss SNF.  CXR this morning showed mild air and fecal distension of colon.    Review of Systems   Constitutional: Negative for chills, fever and weight loss.   HENT: Negative for congestion, hearing loss, sore throat and tinnitus.    Eyes: Negative for blurred vision and double vision.   Respiratory: Negative for cough, sputum production, shortness of breath and wheezing.    Cardiovascular: Positive for leg swelling. Negative for chest pain, palpitations and orthopnea.   Gastrointestinal: Positive for constipation. Negative for abdominal pain, diarrhea, nausea and vomiting.   Genitourinary: Positive for dysuria. Negative for frequency, hematuria and urgency.   Musculoskeletal: Positive for joint pain.   Skin: Negative for rash.   Neurological: Negative for dizziness, tingling, tremors, sensory change, weakness and headaches.   Psychiatric/Behavioral: Negative for depression. The patient is not nervous/anxious.              Physical Exam       Vitals:    17 0400 17 0800 17 1100   BP: 123/74 102/59 (!) 98/66 107/68   Pulse: 76 64 77 95   Resp: 18  18   Temp: 37.4 °C (99.3  °F) 36.2 °C (97.1 °F) 37.9 °C (100.2 °F) (!) 38.1 °C (100.5 °F)   SpO2: 93%  95% 93%   Weight:       Height:         Body mass index is 30.01 kg/m².    Oxygen Therapy:  Pulse Oximetry: 93 %, O2 (LPM): 0, O2 Delivery: None (Room Air)    Physical Exam   Constitutional: She is oriented to person, place, and time and well-developed, well-nourished, and in no distress.   HENT:   Head: Normocephalic and atraumatic.   Mouth/Throat: Oropharynx is clear and moist.   Eyes: Conjunctivae and EOM are normal. Pupils are equal, round, and reactive to light.   Neck: Neck supple. No thyromegaly present.   Cardiovascular: Normal rate, regular rhythm, S1 normal, S2 normal and intact distal pulses.  Exam reveals gallop. Exam reveals no friction rub.    Murmur heard.   Systolic murmur is present with a grade of 3/6   Pulmonary/Chest: Effort normal and breath sounds normal. No respiratory distress. She has no wheezes. She has no rales.   Abdominal: Soft. Bowel sounds are normal. She exhibits no distension. There is no tenderness. There is no rebound and no guarding.   Musculoskeletal:        Right knee: She exhibits swelling.   R knee bandaged with mild bruising over R thigh s/p TKR   Lymphadenopathy:     She has no cervical adenopathy.   Neurological: She is alert and oriented to person, place, and time. No cranial nerve deficit. Coordination normal.   Skin: Skin is warm and dry. No rash noted. No erythema.   Psychiatric: Mood and affect normal.             Assessment/Plan   1. R knee septic arthritis s/p TKA and washout  - Synovial fluid intially w/ 20,000 WBCs, 99% polys  - Continue vancomycin and cefazolin per pharmacy recommendations and await ID recs.   - Hold propranolol and felodipine to prevent hypotension  - Hold metformin due to acute illness possibly requiring imaging  - Likely not infective endocarditis due to no change in echo since 2008.  - Oxycodone 10-15mg q4h PRN and morphine IV 3mg q3h PRN to control pain  - Blood  culture shows possible contaminant with 1 of 2 bottles growing viridans strep  - Wound culture growing rare Stenotrophomonas maltophilia. ID recommends bactrim and ancef for 6 week duration.  - PICC line placed in LUE yesterday  - PT/OT evaluation recommend discharge to SNF and patient is agreeable     2. Normocytic Anemia  - Hgb7.6, Hct 23.9, platelets 148  - CBC remains stable since yesterday  - Fe 34, total bili 0.9, direct bili 0.3, indirect bili 0.6, , folate >23.7, B12 >1500, ferritin 133  - Fe deficiency vs anemia chronic disease (ACD) vs blood loss  - Blood loss 2/2 surgery possible. Reported 100mL blood loss during I&D. Last colonoscopy in 2015 and no abdominal symptoms makes GI bleed less likely.  - ACD possible due to h/o multiple chronic inflammatory disease and infection  - Fe deficiency possible due to acute infections and recurrent surgical interventions.   - Restart home Fe supplementation   - Hold enoxaparin for DVT prophylaxis due to decreasing H&H. Use SCD prophylaxis  - Transfuse RBCs if Hgb < 7  - H&H q8hrs until stable  - Obtain FOBT    3. Constipation  - Last BM was 9/3  - Suppository given  - Progress to enema if no BM by this evening  - Discharge pending BM     4. Dysuria  - Patient reports dysuria for 2 days  - UA returned clean with no signs of infection  - Denies flank pain and hematuria  - Currently on vanco and cefazolin for knee infection     5. DM2  - Discontinue metformin due to acute illness and possible need for imaging.  - HbA1c 5.5%  - Glucose under good control, POC in 120-150s and chem panel 115. Maintain current ISS therapy     6. Cirrhosis  - US in 7/2017 showed no change in heterogeneous hepatic architecture and dilated main portal vein consistent with cirrhosis and portal HTN.  - Patient claims she quit drinking alcohol 3-4 years ago and has normal LFTs, Tbili and albumin.

## 2017-09-07 NOTE — DISCHARGE PLANNING
SUZI notified by bedside nurse that SNF order will be placed. SW went to pt's room, pt currently in restroom.     SW to meet with pt later.

## 2017-09-07 NOTE — THERAPY
"Pt agreeable to tx today. Pt mobility slightly improving. Pt unable to perform seated LE therexs and SAQ due to weak R quad strength. Pt ROM has improved since prev tx. Pt ROM: 3 degress ext-88 degress flexion, which is an improvement. Pt has been using CPM. pt stated she has been tolerating CPM well. Pt able to get OOB @ [facility] w/compemsatory strategies. Pt cont to require Ghislaine to get back to bed to manage R LE. Pt able to amb further today. pt required verbal cuing and postural facilitations as she cont to amb w/forward flexed posture and limited knee flexion w/swing through. Pt heel-toe is limited as well. Pt educated on accentuating R stepping w/DF-PF and bending her R knee. Pt tended to hinge @ hips and lean back to compensate. Pt re-educated on gait and proper posture. Pt receptive second bout of reinforcement. Pt tolerated well. Pt stated she would prefer to go to a facility vs home. She will not have help @ home and would pose a fall risk, as she presents w/R LE weakness and will not be able to get to her OP appointments or care for herself. Discussed w/pt benefits of placement @ a facility vs home w/HH or caregiver.  Will discuss w/supervising PT for decision.    Physical Therapy Treatment completed.   Bed Mobility:  Supine to Sit: Stand by Assist  Transfers: Sit to Stand: Supervised  Gait: Level Of Assist: Stand by Assist with Front-Wheel Walker       Plan of Care: Will benefit from Physical Therapy 4 times per week  Discharge Recommendations: Equipment: Will Continue to Assess for Equipment Needs. Post-acute therapy Discharge to home with outpatient or home health for additional skilled therapy services.     See \"Rehab Therapy-Acute\" Patient Summary Report for complete documentation.       "

## 2017-09-07 NOTE — CARE PLAN
Problem: Pain Management  Goal: Pain level will decrease to patient's comfort goal  Outcome: PROGRESSING AS EXPECTED  Pain was kept within tolerable levels throughout this shift    Problem: Mobility  Goal: Risk for activity intolerance will decrease  Patient ambulated the 3 times of 50 feet for this shift

## 2017-09-07 NOTE — DISCHARGE PLANNING
Met with pt at bedside to discuss choice. Pt signed choice form and chose 1.) [facility] 2.) [facility] 3.) [facility]. Form emailed to [facility].

## 2017-09-07 NOTE — PROGRESS NOTES
Infectious Disease Progress Note    Author: Kori Lewis M.D. Date & Time created: 9/7/2017  1:47 PM    Interval History:  POD # 3 - L I&D right knee, poly exchange  Cefazolin Day #3/ po Bactrim: Day #1 ( added 9/6)    Culture now with Stenotrophomonas and Strep reported as contaminant. No fevers. Knee pain controlled.  Niece in hospital with liver problems.  Has signed choice form for SNF    Labs Reviewed, Medications Reviewed, Fluids Reviewed and GI Nutrition Reviewed    Review of Systems:  Review of Systems   Constitutional: Negative for chills.   HENT: Negative.    Eyes: Negative.    Respiratory: Negative.    Cardiovascular: Negative.    Gastrointestinal: Negative.    Genitourinary: Negative.    Musculoskeletal: Negative for joint pain.        Right knee pain.   Neurological: Positive for weakness.       Physical Exam:  Physical Exam   Constitutional: She is oriented to person, place, and time. She appears well-developed and well-nourished.   Eyes: Pupils are equal, round, and reactive to light.   Cardiovascular: Normal rate and regular rhythm.    Pulmonary/Chest: Effort normal and breath sounds normal.   Abdominal: Soft. Bowel sounds are normal.   Musculoskeletal:   Right knee in surgical dressing with ACE wrap.  Staples in place.  Some edema and ecchymoses.  No drainage from incision.  Dry spots of blood on gauze: pinpoint.  Good pedal pulses on right.   Neurological: She is alert and oriented to person, place, and time.       Labs:  Recent Results (from the past 24 hour(s))   HEMOGLOBIN AND HEMATOCRIT    Collection Time: 09/06/17  3:41 PM   Result Value Ref Range    Hemoglobin 8.4 (L) 12.0 - 16.0 g/dL    Hematocrit 25.7 (L) 37.0 - 47.0 %   ACCU-CHEK GLUCOSE    Collection Time: 09/06/17  4:46 PM   Result Value Ref Range    Glucose - Accu-Ck 167 (H) 65 - 99 mg/dL   ACCU-CHEK GLUCOSE    Collection Time: 09/06/17  7:57 PM   Result Value Ref Range    Glucose - Accu-Ck 141 (H) 65 - 99 mg/dL   CBC WITH DIFFERENTIAL     Collection Time: 09/07/17  3:00 AM   Result Value Ref Range    WBC 3.6 (L) 4.8 - 10.8 K/uL    RBC 2.61 (L) 4.20 - 5.40 M/uL    Hemoglobin 7.6 (L) 12.0 - 16.0 g/dL    Hematocrit 23.9 (L) 37.0 - 47.0 %    MCV 91.6 81.4 - 97.8 fL    MCH 29.1 27.0 - 33.0 pg    MCHC 31.8 (L) 33.6 - 35.0 g/dL    RDW 46.2 35.9 - 50.0 fL    Platelet Count 148 (L) 164 - 446 K/uL    MPV 9.5 9.0 - 12.9 fL    Neutrophils-Polys 48.90 44.00 - 72.00 %    Lymphocytes 31.90 22.00 - 41.00 %    Monocytes 11.40 0.00 - 13.40 %    Eosinophils 6.10 0.00 - 6.90 %    Basophils 1.10 0.00 - 1.80 %    Immature Granulocytes 0.60 0.00 - 0.90 %    Nucleated RBC 0.00 /100 WBC    Neutrophils (Absolute) 1.77 (L) 2.00 - 7.15 K/uL    Lymphs (Absolute) 1.15 1.00 - 4.80 K/uL    Monos (Absolute) 0.41 0.00 - 0.85 K/uL    Eos (Absolute) 0.22 0.00 - 0.51 K/uL    Baso (Absolute) 0.04 0.00 - 0.12 K/uL    Immature Granulocytes (abs) 0.02 0.00 - 0.11 K/uL    NRBC (Absolute) 0.00 K/uL   BASIC METABOLIC PANEL    Collection Time: 09/07/17  3:00 AM   Result Value Ref Range    Sodium 135 135 - 145 mmol/L    Potassium 4.3 3.6 - 5.5 mmol/L    Chloride 108 96 - 112 mmol/L    Co2 23 20 - 33 mmol/L    Glucose 104 (H) 65 - 99 mg/dL    Bun 12 8 - 22 mg/dL    Creatinine 0.62 0.50 - 1.40 mg/dL    Calcium 8.5 8.5 - 10.5 mg/dL    Anion Gap 4.0 0.0 - 11.9   ESTIMATED GFR    Collection Time: 09/07/17  3:00 AM   Result Value Ref Range    GFR If African American >60 >60 mL/min/1.73 m 2    GFR If Non African American >60 >60 mL/min/1.73 m 2   ACCU-CHEK GLUCOSE    Collection Time: 09/07/17  6:31 AM   Result Value Ref Range    Glucose - Accu-Ck 100 (H) 65 - 99 mg/dL   ACCU-CHEK GLUCOSE    Collection Time: 09/07/17 11:02 AM   Result Value Ref Range    Glucose - Accu-Ck 127 (H) 65 - 99 mg/dL     Results     Procedure Component Value Units Date/Time    ANAEROBIC CULTURE [[id number]] Collected:  09/03/17 2017    Order Status:  Completed Specimen:  Tissue Updated:  09/06/17 1006     Significant  Indicator NEG     Source TISS     Site Right Knee     Anaerobic Culture, Culture Res Culture in progress.    CULTURE TISSUE W/ GRM STAIN [[id number]] Collected:  09/03/17 2327    Order Status:  Completed Specimen:  Tissue Updated:  09/06/17 1006     Gram Stain Result --     Few WBCs.  No organisms seen.       Significant Indicator NEG     Source TISS     Site Right Knee     Tissue Culture No growth at 72 hours.    CULTURE WOUND W/ GRAM STAIN [[id number]]  (Abnormal)  (Susceptibility) Collected:  09/03/17 2324    Order Status:  Completed Specimen:  Wound Updated:  09/06/17 1005     Significant Indicator POS (POS)     Source WND     Site Right Knee Synovium     Culture Result Wound -- (A)     Growth noted after further incubation,see below for  organism identification.       Gram Stain Result --     Rare WBCs.  No organisms seen.       Culture Result Wound -- (A)     Stenotrophomonas (X.) maltophilia  Rare growth      Narrative:       CALL  [place]  131 tel. [phone],  CALLED  131 tel. [phone] 09/06/2017, 10:05, RB PERF. RESULTS CALLED TO:  [id number] RN    Culture & Susceptibility     STENOTROPHOMONAS (X.) MALTOPHILIA     Antibiotic Sensitivity Microscan Unit Status    Ceftazidime Resistant >16 mcg/mL Final    Levofloxacin Sensitive <=2 mcg/mL Final    Trimeth/Sulfa Sensitive <=2/38 mcg/mL Final                       ANAEROBIC CULTURE [[id number]] Collected:  09/03/17 2324    Order Status:  Completed Specimen:  Wound Updated:  09/06/17 1005     Significant Indicator NEG     Source WND     Site Right Knee Synovium     Anaerobic Culture, Culture Res Culture in progress.    Narrative:       CALL  [place]  131 tel. [phone],  CALLED  131 tel. [phone] 09/06/2017, 10:05, RB PERF. RESULTS CALLED TO:  [id number] RN    FLUID CULTURE W/GRAM STAIN [[id number]] Collected:  09/03/17 1856    Order Status:  Completed Specimen:  Synovial from Synovial Fluid Updated:  09/06/17 0934     Gram Stain Result --     No organisms seen.  Few WBCs.    "    Significant Indicator NEG     Source SYNO     Site Right Knee     Culture Result Bdf No growth at 72 hours.    Narrative:       Right knee stat synovial fluid gram stain and culture  - call  results to Dr Russell [phone]    BLOOD CULTURE x2 [[id number]]  (Abnormal) Collected:  09/03/17 1918    Order Status:  Completed Specimen:  Blood from Peripheral Updated:  09/06/17 0823     Significant Indicator POS (POS)     Source BLD     Site PERIPHERAL     Blood Culture Growth detected by Bactec instrument. (A)     Blood Culture -- (A)     Viridans streptococcus - possible contaminant  Isolated from one bottle only, possible skin contaminant  please correlate with clinical condition.      Narrative:       CALL  [place]  131 tel. [phone],  CALLED  131 tel. [phone] 09/04/2017, 07:23, RB PERF. RESULTS CALLED  TO:Lory [id number] Rn  Per Hospital Policy: Only change Specimen Src: to \"Line\" if  specified by physician order.    BLOOD CULTURE [[id number]] Collected:  09/04/17 1204    Order Status:  Completed Specimen:  Blood from Peripheral Updated:  09/05/17 0935     Significant Indicator NEG     Source BLD     Site PERIPHERAL     Blood Culture --     No Growth    Note: Blood cultures are incubated for 5 days and  are monitored continuously.Positive blood cultures  are called to the RN and reported as soon as  they are identified.      Narrative:       Per Hospital Policy: Only change Specimen Src: to \"Line\" if  specified by physician order.    BLOOD CULTURE [[id number]] Collected:  09/04/17 1213    Order Status:  Completed Specimen:  Blood from Peripheral Updated:  09/05/17 0935     Significant Indicator NEG     Source BLD     Site PERIPHERAL     Blood Culture --     No Growth    Note: Blood cultures are incubated for 5 days and  are monitored continuously.Positive blood cultures  are called to the RN and reported as soon as  they are identified.      Narrative:       Per Hospital Policy: Only change Specimen Src: to \"Line\" " "if  specified by physician order.    BLOOD CULTURE x2 [[id number]] Collected:  09/03/17 2026    Order Status:  Completed Specimen:  Blood from Peripheral Updated:  09/04/17 0831     Significant Indicator NEG     Source BLD     Site PERIPHERAL     Blood Culture --     No Growth    Note: Blood cultures are incubated for 5 days and  are monitored continuously.Positive blood cultures  are called to the RN and reported as soon as  they are identified.      Narrative:       Per Hospital Policy: Only change Specimen Src: to \"Line\" if  specified by physician order.    GRAM STAIN [[id number]] Collected:  09/03/17 2324    Order Status:  Completed Specimen:  Wound Updated:  09/04/17 0755     Significant Indicator .     Source WND     Site Right Knee Synovium     Gram Stain Result --     Rare WBCs.  No organisms seen.      GRAM STAIN [[id number]] Collected:  09/03/17 2327    Order Status:  Completed Specimen:  Tissue Updated:  09/04/17 0755     Significant Indicator .     Source TISS     Site Right Knee     Gram Stain Result --     Few WBCs.  No organisms seen.      URINALYSIS [[id number]] Collected:  09/04/17 0715    Order Status:  Completed Specimen:  Urine from Urine, Clean Catch Updated:  09/04/17 0753     Color Yellow     Character Clear     Specific Gravity 1.016     Ph 5.5     Glucose Negative mg/dL      Ketones Negative mg/dL      Protein Negative mg/dL      Bilirubin Negative     Urobilinogen, Urine 1.0     Nitrite Negative     Leukocyte Esterase Negative     Occult Blood Negative     Micro Urine Req see below     Comment: Microscopic examination not performed when specimen is clear  and chemically negative for protein, blood, leukocyte esterase  and nitrite.         Narrative:       Collected By:[id number] ALEX COMER    GRAM STAIN [[id number]] Collected:  09/03/17 1856    Order Status:  Completed Specimen:  Synovial Updated:  09/03/17 2105     Significant Indicator .     Source SYNO     Site Right Knee     Gram Stain " Result --     No organisms seen.  Few WBCs.      Narrative:       Right knee stat synovial fluid gram stain and culture  - call  results to Dr Russell [phone]        Hemodynamics:  Temp (24hrs), Av.4 °C (99.3 °F), Min:36.2 °C (97.1 °F), Max:38.1 °C (100.5 °F)  Temperature: 37.5 °C (99.5 °F)  Pulse  Av.3  Min: 56  Max: 95   Blood Pressure: 107/68     Central Line Group 1 (A) Left;Basilic Single Lumen;PICC;Power Injection Catheter 4 (Active)   Line Secured Taped;Transparent 2017  8:00 PM   Patency and Function Check Performed at Beginning of Shift 2017  8:00 PM   Line Necessity Assessed Antibiotic Therapy Greater than 7 Days 2017  8:00 PM   Consider Removal of Femoral Line Not Applicable 2017  8:00 PM   Closed Tubing Set Up Yes 2017  8:00 PM   Hand Washing / Gloves Prior to Every Access Yes 2017  8:00 PM   Next Daily Chlorhexidine Bath Due (Regional ONLY) 17  8:00 PM   Site Condition / Description Assessed;Patent;Clean;Dry;Intact 2017  8:00 PM   Signs and Symptoms of Infection None Apparent at this Time 2017  8:00 PM   Dressing Type / Description Transparent;Clean;Dry;Intact 2017  8:00 PM   Dressing Status Initial Dressing 2017  8:00 PM   Next Dressing Change  17  8:00 PM   Date Primary Tubing Changed 17  8:00 PM   Date Secondary Tubing Changed 17  8:00 PM   NEXT Primary Tubing Change  17  8:00 PM   NEXT Secondary Tubing Change  17  8:00 PM   NEXT IV Connector(s) Change Date 17  8:00 PM     Wound:  Surgical Incision  Incision Right Knee (Active)   Wound Bed Other (comment) 2017  8:00 AM   Drainage  None 2017  8:00 AM   Periwound Skin Normal 2017  8:00 AM   Daily - Wound Closure Not Assessed 2017  8:00 AM   Dressing Options Elastic Wrap 2017  8:00 AM   Dressing Status / Change Clean;Dry;Intact 2017  8:00 AM   Daily - Dressing Change Observed  9/7/2017  8:00 AM        Fluids:  Intake/Output       09/05/17 0700 - 09/06/17 0659 09/06/17 0700 - 09/07/17 0659 09/07/17 0700 - 09/08/17 0659      0700-1859 1900-0659 Total 0700-1859 1900-0659 Total 0700-1859 1900-0659 Total       Intake    P.O.  120  240 360  240  720 960  240  -- 240    P.O. 120 240 360 240 720 960 240 -- 240    Total Intake 120 240 360 240 720 960 240 -- 240       Output    Urine  --  -- --  --  -- --  --  -- --    Number of Times Voided 4 x 7 x 11 x 4 x 3 x 7 x -- -- --    Drains  --  10 10  --  -- --  --  -- --    Hemovac 1 -- 10 10 -- -- -- -- -- --    Stool  --  -- --  --  -- --  --  -- --    Number of Times Stooled -- -- -- -- -- -- 1 x -- 1 x    Total Output -- 10 10 -- -- -- -- -- --       Net I/O     120 230 350 240 720 960 240 -- 240           GI/Nutrition:  Orders Placed This Encounter   Procedures   • DIET ORDER     Standing Status:   Standing     Number of Occurrences:   1     Order Specific Question:   Diet:     Answer:   Diabetic [3]     Medications:  Current Facility-Administered Medications   Medication Last Dose   • ceFAZolin (ANCEF) IVPB 2 g 2 g at 09/07/17 1101   • sulfamethoxazole-trimethoprim (BACTRIM DS) 800-160 MG tablet 1 Tab 1 Tab at 09/07/17 0821   • normal saline PF 10-20 mL     • potassium chloride SA (Kdur) tablet 20 mEq 20 mEq at 09/07/17 0821   • cetirizine (ZYRTEC) tablet 10 mg 10 mg at 09/07/17 0821   • fluticasone (FLONASE) nasal spray 50 mcg 50 mcg at 09/05/17 0822   • gabapentin (NEURONTIN) capsule 300 mg 300 mg at 09/07/17 0820   • zolpidem (AMBIEN) tablet 10 mg     • omeprazole (PRILOSEC) capsule 20 mg 20 mg at 09/07/17 0821   • acetaminophen (TYLENOL) tablet 650 mg 650 mg at 09/07/17 1101   • senna-docusate (PERICOLACE or SENOKOT S) 8.6-50 MG per tablet 2 Tab 2 Tab at 09/07/17 0821    And   • polyethylene glycol/lytes (MIRALAX) PACKET 1 Packet 1 Packet at 09/05/17 2033    And   • magnesium hydroxide (MILK OF MAGNESIA) suspension 30 mL 30 mL at 09/06/17  1952    And   • bisacodyl (DULCOLAX) suppository 10 mg 10 mg at 09/07/17 1101   • insulin lispro (HUMALOG) injection 1-6 Units Stopped at 09/06/17 2100   • glucose 4 g chewable tablet 16 g      And   • dextrose 50% (D50W) injection 25 mL     • oxycodone immediate release (ROXICODONE) tablet 10 mg 10 mg at 09/07/17 1101    Or   • oxycodone immediate-release (ROXICODONE) tablet 15 mg 15 mg at 09/07/17 0624   • morphine (pf) 4 mg/ml injection 3 mg 3 mg at 09/05/17 2349   • losartan (COZAAR) tablet 50 mg 50 mg at 09/07/17 0820     Medical Decision Making, by Problem:  Active Hospital Problems    Diagnosis   • Infection [B99.9]   • 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CMS-HCC) [T84.59XA, Z96.659]   • History of rheumatic fever [Z86.79]   • Dysuria [R30.0]   • Cirrhosis, alcoholic (CMS-HCC) [K70.30]   • Normocytic anemia [D64.9]   • History of hypertension [Z86.79]   • Type 2 diabetes mellitus with neurologic complication (CMS-HCC) [E11.49]   • Diabetic polyneuropathy associated with type 2 diabetes mellitus (CMS-HCC) [E11.42]     1.  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  2.  Diabetes mellitus type 2, uncontrolled.  3.  Osteoarthritis.  4.  Asthma.  5.  History of hepatitis C, status post treatment with cirrhosis.    Plan:  Cultures from the knee with growth of Stenotrophomonas. Unusual organim  but only finding. Strep from blood reported as contaminant by lab. Curious as well. Was not reproduced in repeat blood culture. Had been on therapy though.  She states the wound was clearly draining purulent fluid at first then was more bloody.  Must assume infection there. Will initiate Bactrim IV and continue ancef. Could consider Ertapenem for once a day dosing on an outpatient basis. Anticipate 6 weeks of therapy from poly exchange. PICC ok to place today. Will order.    Dispo: Will be difficult. She lives with her dtr, but her dtr and son in law both work.  She  is not certain she could arrange reliable transportation to [facility] for IV therapy.  ? If her PMD would be willing to manage her IV therapy as an outpatient.  ? If she would benefit from SNF vs LTAC.   Pt has signed choice form and various referrals made.    If friend/family able to use wheelchair, she could go visit niece in [facility]    Discussed with pt ,friend and RN    > 40 min >50% of time spent in coordination of care/counseling and medication changes.

## 2017-09-08 LAB
ANION GAP SERPL CALC-SCNC: 5 MMOL/L (ref 0–11.9)
BACTERIA BLD CULT: NORMAL
BACTERIA SPEC ANAEROBE CULT: NORMAL
BACTERIA SPEC ANAEROBE CULT: NORMAL
BUN SERPL-MCNC: 10 MG/DL (ref 8–22)
CALCIUM SERPL-MCNC: 8.9 MG/DL (ref 8.5–10.5)
CHLORIDE SERPL-SCNC: 110 MMOL/L (ref 96–112)
CO2 SERPL-SCNC: 23 MMOL/L (ref 20–33)
CREAT SERPL-MCNC: 0.73 MG/DL (ref 0.5–1.4)
ERYTHROCYTE [DISTWIDTH] IN BLOOD BY AUTOMATED COUNT: 44.4 FL (ref 35.9–50)
GFR SERPL CREATININE-BSD FRML MDRD: >60 ML/MIN/1.73 M 2
GLUCOSE BLD-MCNC: 103 MG/DL (ref 65–99)
GLUCOSE BLD-MCNC: 131 MG/DL (ref 65–99)
GLUCOSE BLD-MCNC: 171 MG/DL (ref 65–99)
GLUCOSE SERPL-MCNC: 109 MG/DL (ref 65–99)
HCT VFR BLD AUTO: 24.2 % (ref 37–47)
HEMOCCULT STL QL: NEGATIVE
HGB BLD-MCNC: 7.9 G/DL (ref 12–16)
MCH RBC QN AUTO: 29.2 PG (ref 27–33)
MCHC RBC AUTO-ENTMCNC: 32.6 G/DL (ref 33.6–35)
MCV RBC AUTO: 89.3 FL (ref 81.4–97.8)
PLATELET # BLD AUTO: 141 K/UL (ref 164–446)
PMV BLD AUTO: 9.5 FL (ref 9–12.9)
POTASSIUM SERPL-SCNC: 4.1 MMOL/L (ref 3.6–5.5)
RBC # BLD AUTO: 2.71 M/UL (ref 4.2–5.4)
SIGNIFICANT IND 70042: NORMAL
SITE SITE: NORMAL
SODIUM SERPL-SCNC: 138 MMOL/L (ref 135–145)
SOURCE SOURCE: NORMAL
WBC # BLD AUTO: 3.5 K/UL (ref 4.8–10.8)

## 2017-09-08 PROCEDURE — 97110 THERAPEUTIC EXERCISES: CPT

## 2017-09-08 PROCEDURE — 82272 OCCULT BLD FECES 1-3 TESTS: CPT

## 2017-09-08 PROCEDURE — 700102 HCHG RX REV CODE 250 W/ 637 OVERRIDE(OP): Performed by: STUDENT IN AN ORGANIZED HEALTH CARE EDUCATION/TRAINING PROGRAM

## 2017-09-08 PROCEDURE — 700102 HCHG RX REV CODE 250 W/ 637 OVERRIDE(OP)

## 2017-09-08 PROCEDURE — A9270 NON-COVERED ITEM OR SERVICE: HCPCS | Performed by: STUDENT IN AN ORGANIZED HEALTH CARE EDUCATION/TRAINING PROGRAM

## 2017-09-08 PROCEDURE — 700101 HCHG RX REV CODE 250: Performed by: INTERNAL MEDICINE

## 2017-09-08 PROCEDURE — A9270 NON-COVERED ITEM OR SERVICE: HCPCS | Performed by: HOSPITALIST

## 2017-09-08 PROCEDURE — 80048 BASIC METABOLIC PNL TOTAL CA: CPT

## 2017-09-08 PROCEDURE — 700105 HCHG RX REV CODE 258

## 2017-09-08 PROCEDURE — 99232 SBSQ HOSP IP/OBS MODERATE 35: CPT | Mod: GC | Performed by: INTERNAL MEDICINE

## 2017-09-08 PROCEDURE — 700102 HCHG RX REV CODE 250 W/ 637 OVERRIDE(OP): Performed by: ORTHOPAEDIC SURGERY

## 2017-09-08 PROCEDURE — 700102 HCHG RX REV CODE 250 W/ 637 OVERRIDE(OP): Performed by: HOSPITALIST

## 2017-09-08 PROCEDURE — A9270 NON-COVERED ITEM OR SERVICE: HCPCS

## 2017-09-08 PROCEDURE — A9270 NON-COVERED ITEM OR SERVICE: HCPCS | Performed by: INTERNAL MEDICINE

## 2017-09-08 PROCEDURE — 82962 GLUCOSE BLOOD TEST: CPT | Mod: 91

## 2017-09-08 PROCEDURE — A9270 NON-COVERED ITEM OR SERVICE: HCPCS | Performed by: ORTHOPAEDIC SURGERY

## 2017-09-08 PROCEDURE — 770001 HCHG ROOM/CARE - MED/SURG/GYN PRIV*

## 2017-09-08 PROCEDURE — 700105 HCHG RX REV CODE 258: Performed by: INTERNAL MEDICINE

## 2017-09-08 PROCEDURE — 85027 COMPLETE CBC AUTOMATED: CPT

## 2017-09-08 PROCEDURE — 700111 HCHG RX REV CODE 636 W/ 250 OVERRIDE (IP): Performed by: INTERNAL MEDICINE

## 2017-09-08 PROCEDURE — 97116 GAIT TRAINING THERAPY: CPT

## 2017-09-08 PROCEDURE — 97535 SELF CARE MNGMENT TRAINING: CPT

## 2017-09-08 PROCEDURE — 700102 HCHG RX REV CODE 250 W/ 637 OVERRIDE(OP): Performed by: INTERNAL MEDICINE

## 2017-09-08 RX ORDER — SODIUM CHLORIDE 9 MG/ML
INJECTION, SOLUTION INTRAVENOUS
Status: COMPLETED
Start: 2017-09-08 | End: 2017-09-08

## 2017-09-08 RX ADMIN — Medication 325 MG: at 17:23

## 2017-09-08 RX ADMIN — INSULIN LISPRO 1 UNITS: 100 INJECTION, SOLUTION INTRAVENOUS; SUBCUTANEOUS at 21:45

## 2017-09-08 RX ADMIN — SODIUM CHLORIDE 500 ML: 9 INJECTION, SOLUTION INTRAVENOUS at 00:20

## 2017-09-08 RX ADMIN — GABAPENTIN 300 MG: 300 CAPSULE ORAL at 15:36

## 2017-09-08 RX ADMIN — OXYCODONE HYDROCHLORIDE 10 MG: 10 TABLET ORAL at 13:04

## 2017-09-08 RX ADMIN — SULFAMETHOXAZOLE AND TRIMETHOPRIM 320 MG: 80; 16 INJECTION, SOLUTION, CONCENTRATE INTRAVENOUS at 21:30

## 2017-09-08 RX ADMIN — OMEPRAZOLE 20 MG: 20 CAPSULE, DELAYED RELEASE ORAL at 08:39

## 2017-09-08 RX ADMIN — CEFAZOLIN SODIUM 2 G: 2 INJECTION, SOLUTION INTRAVENOUS at 17:23

## 2017-09-08 RX ADMIN — POTASSIUM CHLORIDE 20 MEQ: 1500 TABLET, EXTENDED RELEASE ORAL at 08:38

## 2017-09-08 RX ADMIN — OXYCODONE HYDROCHLORIDE 10 MG: 10 TABLET ORAL at 17:23

## 2017-09-08 RX ADMIN — OXYCODONE HYDROCHLORIDE 15 MG: 5 TABLET ORAL at 00:05

## 2017-09-08 RX ADMIN — Medication 325 MG: at 06:24

## 2017-09-08 RX ADMIN — CEFAZOLIN SODIUM 2 G: 2 INJECTION, SOLUTION INTRAVENOUS at 01:11

## 2017-09-08 RX ADMIN — GABAPENTIN 300 MG: 300 CAPSULE ORAL at 08:38

## 2017-09-08 RX ADMIN — POTASSIUM CHLORIDE 20 MEQ: 1500 TABLET, EXTENDED RELEASE ORAL at 21:38

## 2017-09-08 RX ADMIN — LOSARTAN POTASSIUM 50 MG: 50 TABLET, FILM COATED ORAL at 08:39

## 2017-09-08 RX ADMIN — STANDARDIZED SENNA CONCENTRATE AND DOCUSATE SODIUM 2 TABLET: 8.6; 5 TABLET, FILM COATED ORAL at 08:39

## 2017-09-08 RX ADMIN — SULFAMETHOXAZOLE AND TRIMETHOPRIM 320 MG: 80; 16 INJECTION, SOLUTION, CONCENTRATE INTRAVENOUS at 11:27

## 2017-09-08 RX ADMIN — OXYCODONE HYDROCHLORIDE 10 MG: 10 TABLET ORAL at 21:38

## 2017-09-08 RX ADMIN — OXYCODONE HYDROCHLORIDE 10 MG: 10 TABLET ORAL at 04:35

## 2017-09-08 RX ADMIN — CETIRIZINE HYDROCHLORIDE 10 MG: 10 TABLET, FILM COATED ORAL at 08:39

## 2017-09-08 RX ADMIN — GABAPENTIN 300 MG: 300 CAPSULE ORAL at 21:37

## 2017-09-08 RX ADMIN — CEFAZOLIN SODIUM 2 G: 2 INJECTION, SOLUTION INTRAVENOUS at 08:38

## 2017-09-08 RX ADMIN — OXYCODONE HYDROCHLORIDE 10 MG: 10 TABLET ORAL at 08:39

## 2017-09-08 RX ADMIN — SULFAMETHOXAZOLE AND TRIMETHOPRIM 1 TABLET: 800; 160 TABLET ORAL at 08:38

## 2017-09-08 ASSESSMENT — COGNITIVE AND FUNCTIONAL STATUS - GENERAL
SUGGESTED CMS G CODE MODIFIER MOBILITY: CJ
MOBILITY SCORE: 21
WALKING IN HOSPITAL ROOM: A LITTLE
CLIMB 3 TO 5 STEPS WITH RAILING: A LITTLE
MOVING FROM LYING ON BACK TO SITTING ON SIDE OF FLAT BED: A LITTLE

## 2017-09-08 ASSESSMENT — ENCOUNTER SYMPTOMS
DIZZINESS: 0
HEADACHES: 0
NAUSEA: 0
SENSORY CHANGE: 0
PND: 0
PALPITATIONS: 0
EYE PAIN: 0
WHEEZING: 0
ABDOMINAL PAIN: 0
CONSTIPATION: 0
SHORTNESS OF BREATH: 0
DOUBLE VISION: 0
DIARRHEA: 0
HEADACHES: 1
GASTROINTESTINAL NEGATIVE: 1
DEPRESSION: 0
DIAPHORESIS: 0
NERVOUS/ANXIOUS: 0
SPUTUM PRODUCTION: 0
FOCAL WEAKNESS: 0
SORE THROAT: 0
SPEECH CHANGE: 0
MYALGIAS: 0
RESPIRATORY NEGATIVE: 1
FEVER: 0
COUGH: 0
BLURRED VISION: 0
BLOOD IN STOOL: 0
HEMOPTYSIS: 0
TREMORS: 0
WEAKNESS: 1
CARDIOVASCULAR NEGATIVE: 1
EYES NEGATIVE: 1
TINGLING: 0
ORTHOPNEA: 0
SEIZURES: 0
CHILLS: 0
VOMITING: 0

## 2017-09-08 ASSESSMENT — GAIT ASSESSMENTS
ASSISTIVE DEVICE: FRONT WHEEL WALKER
GAIT LEVEL OF ASSIST: SUPERVISED
DISTANCE (FEET): 250

## 2017-09-08 ASSESSMENT — PAIN SCALES - GENERAL
PAINLEVEL_OUTOF10: 8
PAINLEVEL_OUTOF10: 6
PAINLEVEL_OUTOF10: 8
PAINLEVEL_OUTOF10: 8
PAINLEVEL_OUTOF10: 6
PAINLEVEL_OUTOF10: 6
PAINLEVEL_OUTOF10: 4
PAINLEVEL_OUTOF10: 8
PAINLEVEL_OUTOF10: 7
PAINLEVEL_OUTOF10: 6

## 2017-09-08 NOTE — DISCHARGE PLANNING
Received notice from [facility] they have declined patient as no open Medicaid beds.

## 2017-09-08 NOTE — PROGRESS NOTES
Internal Medicine Interval Note    Name April Alicia     1960   Age/Sex 57 y.o. female   MRN [id number]   Code Status FULL     After 5PM or if no immediate response to page, please call for cross-coverage  Attending/Team: Nury See Patient List for primary contact information  Call [phone] to page    1st Call - Day Intern (R1):   Dr. Wen 2nd Call - Day Sr. Resident (R2/R3):   Dr. Garcia         Reason for interval visit  (Principal Problem)   Infected right knee total arthroplasty s/p I&D with poly exchange    Interval Problem Daily Status Update  (24 hours)   POD #4 from I&D.  Continued knee pain today, though controlled w/ PRNs.   No fever/chills, CP, palp, SOB, N/V, diarrhea, abdominal or back pain. Had BMx2 yesterday.  Pt verbalizes understanding re: discharge planning and need for continued IV abx.        Review of Systems   Constitutional: Negative for chills, diaphoresis, fever and malaise/fatigue.   HENT: Negative for congestion and sore throat.    Eyes: Negative for blurred vision, double vision and pain.   Respiratory: Negative for cough, hemoptysis, shortness of breath and wheezing.    Cardiovascular: Negative for chest pain, palpitations, orthopnea, leg swelling and PND.   Gastrointestinal: Negative for abdominal pain, constipation, diarrhea, nausea and vomiting.   Genitourinary: Positive for dysuria.   Musculoskeletal: Negative for myalgias.   Skin: Negative for itching and rash.   Neurological: Negative for dizziness, sensory change, speech change, focal weakness and headaches.       Consultants/Specialty  Orthopedics  Infectious Disease    Disposition  Inpatient awaiting SNF bed    Quality Measures    Reviewed items::  Labs reviewed, Medications reviewed and Radiology images reviewed  Amaro catheter::  No Amaro  DVT prophylaxis - mechanical:  SCDs (lovenox stopped d/t low Hb)          Physical Exam       Vitals:    17 0000 17 0400 17 0800  09/08/17 1131   BP: 116/75 101/64 118/60 101/60   Pulse: 76 71 74 67   Resp: 17 17 16 14   Temp: 37.4 °C (99.3 °F) 37.2 °C (98.9 °F) 37.2 °C (98.9 °F) 37.1 °C (98.8 °F)   SpO2: 91% 93% 99% 90%   Weight:       Height:         Body mass index is 30.01 kg/m².    Oxygen Therapy:  Pulse Oximetry: 90 %, O2 (LPM): 0, O2 Delivery: None (Room Air)    Physical Exam   Constitutional: She is oriented to person, place, and time and well-developed, well-nourished, and in no distress.   HENT:   Head: Normocephalic and atraumatic.   Mouth/Throat: No oropharyngeal exudate.   Eyes: EOM are normal. Pupils are equal, round, and reactive to light.   Neck: No tracheal deviation present.   Cardiovascular: Normal rate and regular rhythm.    Murmur (3/6 systolic decrescendo, S2 present) heard.  Pulmonary/Chest: Effort normal. No respiratory distress. She has no wheezes. She has no rales.   Abdominal: Soft. Bowel sounds are normal. There is no tenderness. There is no rebound and no guarding.   Musculoskeletal:   Right knee wrapped in ACE bandage. Minimal swelling. No drainage.   Neurological: She is alert and oriented to person, place, and time. No cranial nerve deficit.         Lab Data Review:         9/4/2017  11:38 AM    Recent Labs      09/06/17   0513  09/07/17   0300  09/08/17   0428   SODIUM  134*  135  138   POTASSIUM  3.7  4.3  4.1   CHLORIDE  108  108  110   CO2  22  23  23   BUN  12  12  10   CREATININE  0.68  0.62  0.73   CALCIUM  8.2*  8.5  8.9       Recent Labs      09/06/17   0513  09/06/17   0748  09/07/17   0300  09/08/17   0428   TBILIRUBIN   --   0.9   --    --    DBILIRUBIN   --   0.3   --    --    GLUCOSE  115*   --   104*  109*       Recent Labs      09/06/17   0513  09/06/17   1541  09/07/17   0300  09/08/17   0029   RBC  2.64*   --   2.61*  2.71*   HEMOGLOBIN  7.7*  8.4*  7.6*  7.9*   HEMATOCRIT  24.1*  25.7*  23.9*  24.2*   PLATELETCT  136*   --   148*  141*       Recent Labs      09/06/17   0513  09/06/17   0748   09/07/17   0300  09/08/17   0029   WBC  4.0*   --   3.6*  3.5*   NEUTSPOLYS  50.60   --   48.90   --    LYMPHOCYTES  31.10   --   31.90   --    MONOCYTES  11.40   --   11.40   --    EOSINOPHILS  5.70   --   6.10   --    BASOPHILS  0.70   --   1.10   --    TBILIRUBIN   --   0.9   --    --            Assessment/Plan     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CMS-HCC)- (present on admission)   Assessment & Plan    TKR on 8/21, developed redness/swelling/pain x48hrs.   s/p I&D with poly exchange 9/4  Synovial fluid w/ 20,000 WBCs, 99% polys  Wound Cx grew Stenotrophomonas maltophilia; ID is following, appreciate recs.  Currently afebrile. No subj. chills or myalgias.  Plan:  Pain adequately controlled w/ Fabi 10-15mg q4h, with IV morphine 3mg q3h PRN.   Currently on Ancef and Bactrim per ID recs.   Will need 4 weeks of abx; PICC in place.  Has been accepted at [facility], pending available medicaid bed. Per  she is accepted there for either IV Bactrim BID and IV Ancef TID, or IV Bactrim BID and Ertapenem qDay.          Normocytic anemia- (present on admission)   Assessment & Plan    Baseline Hb 12-13. Has stabilized around 8.   Normocytic, likely anemia of chronic inflammatory disease, ? Iron deficiency.  Normocytic, normal RDW. Corrected retic count normal.   No obvious active bleed, no SOB, CP, abd/back pain, thigh pain or P/E changes. Blood loss from surgery was minimal, no drainage from wound currently.  fobt negative.   Plan:  - CTM while inpt, transfuse for <7.0  - Stopped lovenox ppx, SCD in place on the left.  - Restarted patient 's home PO iron 9/7/2017        History of rheumatic fever- (present on admission)   Assessment & Plan    H/o rheumatic fever at age 26.    3/6 systolic murmur on exam, likely MR.   Echo shows no changes from 2008.  BCx 9/3 NGTD    Infective endocarditis is unlikely.        Type 2 diabetes mellitus with neurologic complication (CMS-HCC)- (present on admission)   Assessment & Plan     Holding metformin to avoid metabolic acidosis.  Diabetic diet, ISS   A1c 5.5%        History of hypertension- (present on admission)   Assessment & Plan    As above, holding antihypertensives (losartan, felodipine) in the setting of infection.         Cirrhosis, alcoholic (CMS-HCC)- (present on admission)   Assessment & Plan    Previous U/S showed findings of cirrhosis w/ portal HTN; pt quit drinking 3 years ago. Normal LFTs, Tbili, Alb, plt. Amanda class A.  Propranolol that was started for variceal ppx was d/c'd in the setting of infection.

## 2017-09-08 NOTE — PROGRESS NOTES
"Post op day # 5    No New Complaints , pain better.  Awaiting SNF bed    Blood pressure 101/60, pulse 67, temperature 37.1 °C (98.8 °F), resp. rate 14, height 1.702 m (5' 7\"), weight 86.9 kg (191 lb 9.3 oz), last menstrual period 06/02/2008, SpO2 90 %.    Neurovascular intact  Wound Clean and Dry  Minimal swelling, no drainage, motion improving    Recent Labs      09/06/17   0513  09/06/17   1541  09/07/17   0300  09/08/17   0029   WBC  4.0*   --   3.6*  3.5*   RBC  2.64*   --   2.61*  2.71*   HEMOGLOBIN  7.7*  8.4*  7.6*  7.9*   HEMATOCRIT  24.1*  25.7*  23.9*  24.2*   MCV  91.3   --   91.6  89.3   MCH  29.2   --   29.1  29.2   MCHC  32.0*   --   31.8*  32.6*   RDW  46.5   --   46.2  44.4   PLATELETCT  136*   --   148*  141*   MPV  9.8   --   9.5  9.5     Recent Labs      09/06/17   0513  09/07/17   0300  09/08/17   0428   SODIUM  134*  135  138   POTASSIUM  3.7  4.3  4.1   CHLORIDE  108  108  110   CO2  22  23  23   GLUCOSE  115*  104*  109*   BUN  12  12  10   CREATININE  0.68  0.62  0.73   CALCIUM  8.2*  8.5  8.9       Plan:Agree with SNF .  Continue with aggressive ROM, CPM, advance as tolerated.  WBAT on right.  May shower with incision covered.  Should f/u with me in 10-14 days ([phone]).  I will sign off.  Please call if questions or concerns.  "

## 2017-09-08 NOTE — DISCHARGE PLANNING
Received call from Svetlana at [facility] they have declined patient as no open Medicaid beds.

## 2017-09-08 NOTE — DISCHARGE PLANNING
SW met with MD. Pt will need Rocephin (3x a day) or Ertapenem (1x a day) and will also need Backdrim IV (2x a day) consecutively with one of the listed abx.  SW to have [facility] call SNF to see if these abx will be a barrier in placement.

## 2017-09-08 NOTE — CARE PLAN
Problem: Pain Management  Goal: Pain level will decrease to patient's comfort goal  Patient medicated twice for pain rated at 8/10.  Patient's pain relieved and she was able to rest comfortably.

## 2017-09-08 NOTE — DISCHARGE PLANNING
SW notified by [facility] that pt has been accepted to [facility], but no beds are available.  SW obtained verbal auth from pt to do blanket referral.

## 2017-09-08 NOTE — CARE PLAN
Problem: Safety  Goal: Will remain free from falls  Patient is very compliant with call bell and waits for staff assistance.  Patient is near nursing station and is monitored frequently.

## 2017-09-08 NOTE — DISCHARGE PLANNING
Received call from Pranav at [facility] they have declined patient due to no Medicaid beds.

## 2017-09-08 NOTE — DISCHARGE PLANNING
Spoke with Tg at [facility], they have accepted patient pending open Medicaid bed. Per Tg she is unsure when they will have an open bed.

## 2017-09-08 NOTE — NON-PROVIDER
Internal Medicine Medical Student Note    Name April Alicia     1960   Age/Sex 57 y.o. female   MRN [id number]   Code Status Full     After 5PM or if no immediate response to page, please call for cross-coverage  Attending/Team: Smiley See Patient List for primary contact information  Call [phone] to page after hours   1st Call - Day Intern (R1):    2nd Call - Day Sr. Resident (R2/R3):            Reason for interval visit  (Principal Problem)   R knee septic arthritis s/p TKR    Interval Problem Daily Status Update  (24 hours)   No acute overnight events. Patient was claiming HA this morning coming on which feels like previous migraines she has had. She has Imitrex that she takes at home. 50mg Imitrex ordered for her. Knee pain is under control. Placed in [facility] but awaiting bed. Tolerating CMP well. Had 2x BM yesterday so is clear to go medically.      Review of Systems   Constitutional: Negative for chills, fever and malaise/fatigue.   HENT: Negative for congestion, ear pain, hearing loss, sore throat and tinnitus.    Eyes: Negative for blurred vision and double vision.   Respiratory: Negative for cough, sputum production, shortness of breath and wheezing.    Cardiovascular: Positive for leg swelling. Negative for chest pain, palpitations and orthopnea.   Gastrointestinal: Negative for abdominal pain, blood in stool, constipation, diarrhea, nausea and vomiting.   Genitourinary: Negative for dysuria, frequency and urgency.   Musculoskeletal: Positive for joint pain.   Skin: Negative for rash.   Neurological: Positive for headaches. Negative for dizziness, tingling, tremors, sensory change and seizures.   Psychiatric/Behavioral: Negative for depression. The patient is not nervous/anxious.              Physical Exam       Vitals:    17 1944 17 0000 17 0400 17 0800   BP: 115/70 116/75 101/64 118/60   Pulse: 65 76 71 74   Resp: 18 17 17 16   Temp: 37 °C (98.6  °F) 37.4 °C (99.3 °F) 37.2 °C (98.9 °F) 37.2 °C (98.9 °F)   SpO2: 95% 91% 93% 99%   Weight:       Height:         Body mass index is 30.01 kg/m².    Oxygen Therapy:  Pulse Oximetry: 99 %, O2 (LPM): 0, O2 Delivery: None (Room Air)    Physical Exam   Constitutional: She is oriented to person, place, and time and well-developed, well-nourished, and in no distress.   HENT:   Head: Normocephalic and atraumatic.   Mouth/Throat: Oropharynx is clear and moist.   Eyes: Conjunctivae and EOM are normal. Pupils are equal, round, and reactive to light.   Neck: Neck supple. No thyromegaly present.   Cardiovascular: Regular rhythm, S1 normal and S2 normal.  Exam reveals no gallop and no friction rub.    Murmur heard.   Systolic murmur is present with a grade of 3/6   Pulmonary/Chest: Effort normal and breath sounds normal. No respiratory distress. She has no wheezes. She has no rales.   Abdominal: Soft. Bowel sounds are normal. She exhibits no distension. There is no tenderness. There is no rebound and no guarding.   Musculoskeletal:        Right knee: She exhibits swelling.   Lymphadenopathy:     She has no cervical adenopathy.   Neurological: She is alert and oriented to person, place, and time. No cranial nerve deficit. Coordination normal.   Skin: Skin is warm and dry. No erythema.   Bruising of R proximal thigh   Psychiatric: Mood and affect normal.           Assessment/Plan   1. R knee septic arthritis s/p TKA and washout  - Synovial fluid intially w/ 20,000 WBCs, 99% polys  - Continue ansef and bactrim per ID recommendations  - Hold propranolol and felodipine to prevent hypotension  - Hold metformin due to acute illness possibly requiring imaging  - Likely not infective endocarditis due to no change in echo since 2008.  - Oxycodone 10-15mg q4h PRN and morphine IV 3mg q3h PRN to control pain  - Blood culture shows possible contaminant with 1 of 2 bottles growing viridans strep  - Wound culture growing rare Stenotrophomonas  maltophilia. ID recommends IV bactrim bid and either ancef tid or irtapenem once daily for 4-6 week duration.  - PICC line placed in LUE   - PT/OT evaluation recommend discharge to SNF and patient is agreeable. Awaiting SNF placement     2. Normocytic Anemia  - Hgb7.9, Hct 24.2, platelets 141  - CBC remains stable   - Fe 34, total bili 0.9, direct bili 0.3, indirect bili 0.6, , folate >23.7, B12 >1500, ferritin 133, haptoglobin 76  - Fe deficiency vs anemia chronic disease (ACD) vs blood loss  - Blood loss 2/2 surgery possible. Reported 100mL blood loss during I&D. Last colonoscopy in 2015 and no abdominal symptoms makes GI bleed less likely.  - ACD possible due to h/o multiple chronic inflammatory disease and infection  - Fe deficiency possible due to acute infections and recurrent surgical interventions leading to chronic blood loss.   - Restart home Fe supplementation   - Hold enoxaparin for DVT prophylaxis due to decreasing H&H. Use SCD prophylaxis  - Transfuse RBCs if Hgb < 7     3. Constipation  - 2x BM yesterday after suppository  - Medically cleared for discharge. Awaiting SNF placement     4. Dysuria  - Patient reported dysuria for 2 days  - UA returned clean with no signs of infection  - Denies flank pain and hematuria  - Currently on bactrim and ansef for knee infection     5. DM2  - Discontinue metformin due to acute illness and possible need for imaging.  - HbA1c 5.5%  - Glucose under good control, POC in 120-150s and chem panel 109. Maintain current ISS therapy     6. Cirrhosis  - US in 7/2017 showed no change in heterogeneous hepatic architecture and dilated main portal vein consistent with cirrhosis and portal HTN.  - Patient claims she quit drinking alcohol 3-4 years ago and has normal LFTs, Tbili and albumin.

## 2017-09-08 NOTE — THERAPY
"Pt agreeable to tx todaty. Pt mobility is improving. Pt ROM slowing mproving. Pt AROM:  dgeress ext-80 degress flexion in supine. Pt PROM: 3 dgress ext-92 degrees flexion in supine. Pt tolerated well. Pt able to perofrm SAQ this tx and perform 1 rep of SLR. Pt c/o incresed pain w/SLR. Educated pt about weak quad strength, which leads ot quad lag, which causes more stress around incision. Pt receptive. Pt educated in depth about importance of improving her ROm for better gait. Pt encouraged to keep performing LE therexs and lead up to performing SLR when stronger. Pt encouraged to cont using CPM and manually flexing R knee. Pt able to amb further today and ascend/descend 2 steps w/B railing @ [facility]. Pt did not fatigue as easily this tx. Pt amb w/less stiff legged and able to perform maty-toe strike w/felxed R knee. Pt would benefit from further acute PT txs to progress pt strength and flexibility, and independence.   although pt has met all goals, pt cont to have R LE weakness, which poses a fall risk.    Physical Therapy Treatment completed.   Bed Mobility:  Supine to Sit: Supervised  Transfers: Sit to Stand: Supervised  Gait: Level Of Assist: Supervised with Front-Wheel Walker       Plan of Care: Will benefit from Physical Therapy 4 times per week  Discharge Recommendations: Equipment: Will Continue to Assess for Equipment Needs. Post-acute therapy Discharge to a transitional care facility for continued skilled therapy services.     See \"Rehab Therapy-Acute\" Patient Summary Report for complete documentation.       "

## 2017-09-08 NOTE — PROGRESS NOTES
Patient's hemoglobin for evening lab draw was 7.9.  Lab draw was late due to change of shift and clotted PICC line.  Cath-bello was ordered but I had to wait for pharmacy to deliver.

## 2017-09-08 NOTE — DISCHARGE PLANNING
Received choice form from [facility] Ingrid at 0841.  Referral sent to [facility] [place] and [facility] at 0922 on 09-08-17.

## 2017-09-08 NOTE — DISCHARGE PLANNING
Received choice form from [facility] Ingrid, referral sent to all local SNF at 1408 on 09/08/17.

## 2017-09-08 NOTE — PROGRESS NOTES
Infectious Disease Progress Note    Author: Eddie Max M.D. Date & Time created: 9/8/2017  9:43 AM    Interval History:  POD # 4 - L I&D right knee, poly exchange  Cefazolin Day #4/ po Bactrim: Day #2 ( added 9/6)    Culture now with Stenotrophomonas and Strep reported as contaminant. Knee pain controlled. Some soreness to site where hemovac was. Fever low grade yesterday at 11am 100.5. None since. Niece in hospital with liver problems. Has signed choice form for SNF    Labs Reviewed, Medications Reviewed, Fluids Reviewed and GI Nutrition Reviewed    Review of Systems:  Review of Systems   Constitutional: Negative for chills.   HENT: Negative.    Eyes: Negative.    Respiratory: Negative.    Cardiovascular: Negative.    Gastrointestinal: Negative.    Genitourinary: Negative.    Musculoskeletal: Negative for joint pain.        Right knee pain.   Neurological: Positive for weakness.       Physical Exam:  Physical Exam   Constitutional: She is oriented to person, place, and time. She appears well-developed and well-nourished.   Eyes: Pupils are equal, round, and reactive to light.   Cardiovascular: Normal rate and regular rhythm.    Pulmonary/Chest: Effort normal and breath sounds normal.   Abdominal: Soft. Bowel sounds are normal.   Musculoskeletal:   Right knee in surgical dressing with ACE wrap. No drainage from incision. Good pedal pulses on right.   Neurological: She is alert and oriented to person, place, and time.       Labs:  Recent Results (from the past 24 hour(s))   ACCU-CHEK GLUCOSE    Collection Time: 09/07/17 11:02 AM   Result Value Ref Range    Glucose - Accu-Ck 127 (H) 65 - 99 mg/dL   ACCU-CHEK GLUCOSE    Collection Time: 09/07/17  8:51 PM   Result Value Ref Range    Glucose - Accu-Ck 147 (H) 65 - 99 mg/dL   CBC WITHOUT DIFFERENTIAL    Collection Time: 09/08/17 12:29 AM   Result Value Ref Range    WBC 3.5 (L) 4.8 - 10.8 K/uL    RBC 2.71 (L) 4.20 - 5.40 M/uL    Hemoglobin 7.9 (L) 12.0 - 16.0 g/dL     Hematocrit 24.2 (L) 37.0 - 47.0 %    MCV 89.3 81.4 - 97.8 fL    MCH 29.2 27.0 - 33.0 pg    MCHC 32.6 (L) 33.6 - 35.0 g/dL    RDW 44.4 35.9 - 50.0 fL    Platelet Count 141 (L) 164 - 446 K/uL    MPV 9.5 9.0 - 12.9 fL   BASIC METABOLIC PANEL    Collection Time: 09/08/17  4:28 AM   Result Value Ref Range    Sodium 138 135 - 145 mmol/L    Potassium 4.1 3.6 - 5.5 mmol/L    Chloride 110 96 - 112 mmol/L    Co2 23 20 - 33 mmol/L    Glucose 109 (H) 65 - 99 mg/dL    Bun 10 8 - 22 mg/dL    Creatinine 0.73 0.50 - 1.40 mg/dL    Calcium 8.9 8.5 - 10.5 mg/dL    Anion Gap 5.0 0.0 - 11.9   ESTIMATED GFR    Collection Time: 09/08/17  4:28 AM   Result Value Ref Range    GFR If African American >60 >60 mL/min/1.73 m 2    GFR If Non African American >60 >60 mL/min/1.73 m 2   ACCU-CHEK GLUCOSE    Collection Time: 09/08/17  6:22 AM   Result Value Ref Range    Glucose - Accu-Ck 103 (H) 65 - 99 mg/dL     Results     Procedure Component Value Units Date/Time    ANAEROBIC CULTURE [[id number]] Collected:  09/03/17 2327    Order Status:  Completed Specimen:  Tissue Updated:  09/06/17 1006     Significant Indicator NEG     Source TISS     Site Right Knee     Anaerobic Culture, Culture Res Culture in progress.    CULTURE TISSUE W/ GRM STAIN [[id number]] Collected:  09/03/17 2327    Order Status:  Completed Specimen:  Tissue Updated:  09/06/17 1006     Gram Stain Result --     Few WBCs.  No organisms seen.       Significant Indicator NEG     Source TISS     Site Right Knee     Tissue Culture No growth at 72 hours.    CULTURE WOUND W/ GRAM STAIN [[id number]]  (Abnormal)  (Susceptibility) Collected:  09/03/17 2324    Order Status:  Completed Specimen:  Wound Updated:  09/06/17 1005     Significant Indicator POS (POS)     Source WND     Site Right Knee Synovium     Culture Result Wound -- (A)     Growth noted after further incubation,see below for  organism identification.       Gram Stain Result --     Rare WBCs.  No organisms seen.       Culture  Result Wound -- (A)     Stenotrophomonas (X.) maltophilia  Rare growth      Narrative:       CALL  [place]  131 tel. [phone],  CALLED  131 tel. [phone] 09/06/2017, 10:05, RB PERF. RESULTS CALLED TO:  [id number] RN    Culture & Susceptibility     STENOTROPHOMONAS (X.) MALTOPHILIA     Antibiotic Sensitivity Microscan Unit Status    Ceftazidime Resistant >16 mcg/mL Final    Levofloxacin Sensitive <=2 mcg/mL Final    Trimeth/Sulfa Sensitive <=2/38 mcg/mL Final                       ANAEROBIC CULTURE [[id number]] Collected:  09/03/17 2324    Order Status:  Completed Specimen:  Wound Updated:  09/06/17 1005     Significant Indicator NEG     Source WND     Site Right Knee Synovium     Anaerobic Culture, Culture Res Culture in progress.    Narrative:       CALL  [place]  131 tel. [phone],  CALLED  131 tel. [phone] 09/06/2017, 10:05, RB PERF. RESULTS CALLED TO:  [id number] RN    FLUID CULTURE W/GRAM STAIN [[id number]] Collected:  09/03/17 1856    Order Status:  Completed Specimen:  Synovial from Synovial Fluid Updated:  09/06/17 0934     Gram Stain Result --     No organisms seen.  Few WBCs.       Significant Indicator NEG     Source SYNO     Site Right Knee     Culture Result Bdf No growth at 72 hours.    Narrative:       Right knee stat synovial fluid gram stain and culture  - call  results to Dr Russell [phone]    BLOOD CULTURE x2 [[id number]]  (Abnormal) Collected:  09/03/17 1918    Order Status:  Completed Specimen:  Blood from Peripheral Updated:  09/06/17 0823     Significant Indicator POS (POS)     Source BLD     Site PERIPHERAL     Blood Culture Growth detected by Bactec instrument. (A)     Blood Culture -- (A)     Viridans streptococcus - possible contaminant  Isolated from one bottle only, possible skin contaminant  please correlate with clinical condition.      Narrative:       CALL  [place]  131 tel. [phone],  CALLED  131 tel. [phone] 09/04/2017, 07:23, RB PERF. RESULTS CALLED  TO:Lory 01929 Rn  Per Hospital Policy:  "Only change Specimen Src: to \"Line\" if  specified by physician order.    BLOOD CULTURE [[id number]] Collected:  09/04/17 1204    Order Status:  Completed Specimen:  Blood from Peripheral Updated:  09/05/17 0935     Significant Indicator NEG     Source BLD     Site PERIPHERAL     Blood Culture --     No Growth    Note: Blood cultures are incubated for 5 days and  are monitored continuously.Positive blood cultures  are called to the RN and reported as soon as  they are identified.      Narrative:       Per Hospital Policy: Only change Specimen Src: to \"Line\" if  specified by physician order.    BLOOD CULTURE [[id number]] Collected:  09/04/17 1213    Order Status:  Completed Specimen:  Blood from Peripheral Updated:  09/05/17 0935     Significant Indicator NEG     Source BLD     Site PERIPHERAL     Blood Culture --     No Growth    Note: Blood cultures are incubated for 5 days and  are monitored continuously.Positive blood cultures  are called to the RN and reported as soon as  they are identified.      Narrative:       Per Hospital Policy: Only change Specimen Src: to \"Line\" if  specified by physician order.    BLOOD CULTURE x2 [[id number]] Collected:  09/03/17 2026    Order Status:  Completed Specimen:  Blood from Peripheral Updated:  09/04/17 0831     Significant Indicator NEG     Source BLD     Site PERIPHERAL     Blood Culture --     No Growth    Note: Blood cultures are incubated for 5 days and  are monitored continuously.Positive blood cultures  are called to the RN and reported as soon as  they are identified.      Narrative:       Per Hospital Policy: Only change Specimen Src: to \"Line\" if  specified by physician order.    GRAM STAIN [[id number]] Collected:  09/03/17 2324    Order Status:  Completed Specimen:  Wound Updated:  09/04/17 0755     Significant Indicator .     Source WND     Site Right Knee Synovium     Gram Stain Result --     Rare WBCs.  No organisms seen.      GRAM STAIN [[id number]] Collected:  " 17 2327    Order Status:  Completed Specimen:  Tissue Updated:  17 0755     Significant Indicator .     Source TISS     Site Right Knee     Gram Stain Result --     Few WBCs.  No organisms seen.      URINALYSIS [[id number]] Collected:  17 0715    Order Status:  Completed Specimen:  Urine from Urine, Clean Catch Updated:  17 0753     Color Yellow     Character Clear     Specific Gravity 1.016     Ph 5.5     Glucose Negative mg/dL      Ketones Negative mg/dL      Protein Negative mg/dL      Bilirubin Negative     Urobilinogen, Urine 1.0     Nitrite Negative     Leukocyte Esterase Negative     Occult Blood Negative     Micro Urine Req see below     Comment: Microscopic examination not performed when specimen is clear  and chemically negative for protein, blood, leukocyte esterase  and nitrite.         Narrative:       Collected By:[id number] ALEX COMER    GRAM STAIN [[id number]] Collected:  17 1856    Order Status:  Completed Specimen:  Synovial Updated:  17 210     Significant Indicator .     Source SYNO     Site Right Knee     Gram Stain Result --     No organisms seen.  Few WBCs.      Narrative:       Right knee stat synovial fluid gram stain and culture  - call  results to Dr Russell [phone]        Hemodynamics:  Temp (24hrs), Av.3 °C (99.2 °F), Min:37 °C (98.6 °F), Max:38.1 °C (100.5 °F)  Temperature: 37.2 °C (98.9 °F)  Pulse  Av  Min: 56  Max: 95   Blood Pressure: 118/60     Central Line Group 1 (A) Left;Basilic Single Lumen;PICC;Power Injection Catheter 4 (Active)   Line Secured Taped;Transparent 2017  8:00 PM   Patency and Function Check Performed at Beginning of Shift 2017  8:00 PM   Line Necessity Assessed Antibiotic Therapy Greater than 7 Days 2017  8:00 PM   Consider Removal of Femoral Line Not Applicable 2017  8:00 PM   Closed Tubing Set Up Yes 2017  8:00 PM   Hand Washing / Gloves Prior to Every Access Yes 2017  8:00 PM   Next Daily  Chlorhexidine Bath Due (Regional ONLY) 09/07/17 9/6/2017  8:00 PM   Site Condition / Description Assessed;Patent;Clean;Dry;Intact 9/6/2017  8:00 PM   Signs and Symptoms of Infection None Apparent at this Time 9/6/2017  8:00 PM   Dressing Type / Description Transparent;Clean;Dry;Intact 9/6/2017  8:00 PM   Dressing Status Initial Dressing 9/6/2017  8:00 PM   Next Dressing Change  09/09/17 9/6/2017  8:00 PM   Date Primary Tubing Changed 09/06/17 9/6/2017  8:00 PM   Date Secondary Tubing Changed 09/07/17 9/6/2017  8:00 PM   NEXT Primary Tubing Change  09/09/17 9/6/2017  8:00 PM   NEXT Secondary Tubing Change  09/08/17 9/6/2017  8:00 PM   NEXT IV Connector(s) Change Date 09/09/17 9/6/2017  8:00 PM     Wound:  Surgical Incision  Incision Right Knee (Active)   Wound Bed Other (comment) 9/7/2017  8:00 AM   Drainage  None 9/7/2017  8:00 AM   Periwound Skin Normal 9/7/2017  8:00 AM   Daily - Wound Closure Not Assessed 9/7/2017  8:00 AM   Dressing Options Elastic Wrap 9/7/2017  8:00 AM   Dressing Status / Change Clean;Dry;Intact 9/7/2017  8:00 AM   Daily - Dressing Change Observed 9/7/2017  8:00 AM        Fluids:  Intake/Output       09/06/17 0700 - 09/07/17 0659 09/07/17 0700 - 09/08/17 0659 09/08/17 0700 - 09/09/17 0659      2620-1862 5337-8295 Total 6344-2107 7824-9203 Total 2728-2652 5981-6342 Total       Intake    P.O.  240  720 960  240  120 360  --  -- --    P.O. 240 720 960 240 120 360 -- -- --    I.V.  --  -- --  --  100 100  --  -- --    IV Piggyback Volume (IV Piggyback) -- -- -- -- 100 100 -- -- --    Total Intake 240 720 960 240 220 460 -- -- --       Output    Urine  --  -- --  --  -- --  --  -- --    Number of Times Voided 4 x 3 x 7 x -- 1 x 1 x 1 x -- 1 x    Stool  --  -- --  --  -- --  --  -- --    Number of Times Stooled -- -- -- 1 x 2 x 3 x 1 x -- 1 x    Total Output -- -- -- -- -- -- -- -- --       Net I/O     [phone] 240 336 750 -- -- --           GI/Nutrition:  Orders Placed This Encounter    Procedures   • DIET ORDER     Standing Status:   Standing     Number of Occurrences:   1     Order Specific Question:   Diet:     Answer:   Diabetic [3]     Medications:  Current Facility-Administered Medications   Medication Last Dose   • fleet enema 133 mL     • ferrous sulfate tablet 325 mg 325 mg at 09/08/17 0624   • ceFAZolin (ANCEF) IVPB 2 g 2 g at 09/08/17 0838   • sulfamethoxazole-trimethoprim (BACTRIM DS) 800-160 MG tablet 1 Tab 1 Tab at 09/08/17 0838   • normal saline PF 10-20 mL     • potassium chloride SA (Kdur) tablet 20 mEq 20 mEq at 09/08/17 0838   • cetirizine (ZYRTEC) tablet 10 mg 10 mg at 09/08/17 0839   • fluticasone (FLONASE) nasal spray 50 mcg 50 mcg at 09/05/17 0822   • gabapentin (NEURONTIN) capsule 300 mg 300 mg at 09/08/17 0838   • zolpidem (AMBIEN) tablet 10 mg     • omeprazole (PRILOSEC) capsule 20 mg 20 mg at 09/08/17 0839   • acetaminophen (TYLENOL) tablet 650 mg 650 mg at 09/07/17 1101   • senna-docusate (PERICOLACE or SENOKOT S) 8.6-50 MG per tablet 2 Tab 2 Tab at 09/08/17 0839    And   • polyethylene glycol/lytes (MIRALAX) PACKET 1 Packet 1 Packet at 09/05/17 2033    And   • magnesium hydroxide (MILK OF MAGNESIA) suspension 30 mL 30 mL at 09/06/17 1952    And   • bisacodyl (DULCOLAX) suppository 10 mg 10 mg at 09/07/17 1101   • insulin lispro (HUMALOG) injection 1-6 Units Stopped at 09/06/17 2100   • glucose 4 g chewable tablet 16 g      And   • dextrose 50% (D50W) injection 25 mL     • oxycodone immediate release (ROXICODONE) tablet 10 mg 10 mg at 09/08/17 0839    Or   • oxycodone immediate-release (ROXICODONE) tablet 15 mg 15 mg at 09/08/17 0005   • morphine (pf) 4 mg/ml injection 3 mg 3 mg at 09/05/17 2349   • losartan (COZAAR) tablet 50 mg 50 mg at 09/08/17 0839     Medical Decision Making, by Problem:  Active Hospital Problems    Diagnosis   • Infection [B99.9]   • 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CMS-[facility]) [T84.59XA, Z96.659]   • History of rheumatic fever [Z86.79]   • Dysuria  [R30.0]   • Cirrhosis, alcoholic (CMS-HCC) [K70.30]   • Normocytic anemia [D64.9]   • History of hypertension [Z86.79]   • Type 2 diabetes mellitus with neurologic complication (CMS-HCC) [E11.49]   • Diabetic polyneuropathy associated with type 2 diabetes mellitus (CMS-HCC) [E11.42]     1.  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  2.  Diabetes mellitus type 2, uncontrolled.  3.  Osteoarthritis.  4.  Asthma.  5.  History of hepatitis C, status post treatment with cirrhosis.    Plan:  Cultures from the knee with growth of Stenotrophomonas. Unusual organim but only finding. Strep from blood reported as contaminant by lab. Was additionally not reproduced in repeat blood culture. Had been on therapy though.  She states the wound was clearly draining purulent fluid at first then was more bloody.  Must assume infection there. Will initiate Bactrim IV and continue ancef. Could consider Ertapenem for once a day dosing as substitute foe ancef on an outpatient basis. Bactrim IV to remain as well.  Anticipate 6 weeks of therapy from poly exchange.    Dispo: Will be difficult. She lives with her dtr, but her dtr and son in law both work.  She is not certain she could arrange reliable transportation to [facility] for IV therapy.  Signed choice form to SNF.    If friend/family able to use wheelchair, she could go visit niece in [facility]    Discussed with pt ,friend and RN    > 40 min >50% of time spent in coordination of care/counseling and medication changes.    Dr Max

## 2017-09-09 LAB
BACTERIA BLD CULT: NORMAL
BACTERIA BLD CULT: NORMAL
GLUCOSE BLD-MCNC: 104 MG/DL (ref 65–99)
GLUCOSE BLD-MCNC: 127 MG/DL (ref 65–99)
GLUCOSE BLD-MCNC: 205 MG/DL (ref 65–99)
HCT VFR BLD AUTO: 23.3 % (ref 37–47)
HGB BLD-MCNC: 7.6 G/DL (ref 12–16)
SIGNIFICANT IND 70042: NORMAL
SIGNIFICANT IND 70042: NORMAL
SITE SITE: NORMAL
SITE SITE: NORMAL
SOURCE SOURCE: NORMAL
SOURCE SOURCE: NORMAL

## 2017-09-09 PROCEDURE — 700102 HCHG RX REV CODE 250 W/ 637 OVERRIDE(OP): Performed by: HOSPITALIST

## 2017-09-09 PROCEDURE — 85014 HEMATOCRIT: CPT

## 2017-09-09 PROCEDURE — 99232 SBSQ HOSP IP/OBS MODERATE 35: CPT | Mod: GC | Performed by: INTERNAL MEDICINE

## 2017-09-09 PROCEDURE — A9270 NON-COVERED ITEM OR SERVICE: HCPCS | Performed by: HOSPITALIST

## 2017-09-09 PROCEDURE — 700105 HCHG RX REV CODE 258: Performed by: INTERNAL MEDICINE

## 2017-09-09 PROCEDURE — 700102 HCHG RX REV CODE 250 W/ 637 OVERRIDE(OP)

## 2017-09-09 PROCEDURE — A9270 NON-COVERED ITEM OR SERVICE: HCPCS | Performed by: ORTHOPAEDIC SURGERY

## 2017-09-09 PROCEDURE — 700105 HCHG RX REV CODE 258

## 2017-09-09 PROCEDURE — 700111 HCHG RX REV CODE 636 W/ 250 OVERRIDE (IP): Performed by: STUDENT IN AN ORGANIZED HEALTH CARE EDUCATION/TRAINING PROGRAM

## 2017-09-09 PROCEDURE — 770001 HCHG ROOM/CARE - MED/SURG/GYN PRIV*

## 2017-09-09 PROCEDURE — 700102 HCHG RX REV CODE 250 W/ 637 OVERRIDE(OP): Performed by: STUDENT IN AN ORGANIZED HEALTH CARE EDUCATION/TRAINING PROGRAM

## 2017-09-09 PROCEDURE — 82962 GLUCOSE BLOOD TEST: CPT

## 2017-09-09 PROCEDURE — A9270 NON-COVERED ITEM OR SERVICE: HCPCS

## 2017-09-09 PROCEDURE — A9270 NON-COVERED ITEM OR SERVICE: HCPCS | Performed by: STUDENT IN AN ORGANIZED HEALTH CARE EDUCATION/TRAINING PROGRAM

## 2017-09-09 PROCEDURE — 700101 HCHG RX REV CODE 250: Performed by: INTERNAL MEDICINE

## 2017-09-09 PROCEDURE — 700102 HCHG RX REV CODE 250 W/ 637 OVERRIDE(OP): Performed by: ORTHOPAEDIC SURGERY

## 2017-09-09 PROCEDURE — 700111 HCHG RX REV CODE 636 W/ 250 OVERRIDE (IP): Performed by: INTERNAL MEDICINE

## 2017-09-09 PROCEDURE — 85018 HEMOGLOBIN: CPT

## 2017-09-09 RX ORDER — CEFAZOLIN SODIUM 1 G/3ML
2 INJECTION, POWDER, FOR SOLUTION INTRAMUSCULAR; INTRAVENOUS EVERY 8 HOURS
Status: DISCONTINUED | OUTPATIENT
Start: 2017-09-09 | End: 2017-09-09

## 2017-09-09 RX ORDER — CEFAZOLIN SODIUM 2 G/100ML
2 INJECTION, SOLUTION INTRAVENOUS EVERY 8 HOURS
Status: DISCONTINUED | OUTPATIENT
Start: 2017-09-09 | End: 2017-09-12

## 2017-09-09 RX ORDER — SODIUM CHLORIDE 9 MG/ML
INJECTION, SOLUTION INTRAVENOUS
Status: COMPLETED
Start: 2017-09-09 | End: 2017-09-09

## 2017-09-09 RX ADMIN — INSULIN LISPRO 2 UNITS: 100 INJECTION, SOLUTION INTRAVENOUS; SUBCUTANEOUS at 11:08

## 2017-09-09 RX ADMIN — GABAPENTIN 300 MG: 300 CAPSULE ORAL at 14:45

## 2017-09-09 RX ADMIN — SULFAMETHOXAZOLE AND TRIMETHOPRIM 320 MG: 80; 16 INJECTION, SOLUTION, CONCENTRATE INTRAVENOUS at 11:02

## 2017-09-09 RX ADMIN — Medication 325 MG: at 06:19

## 2017-09-09 RX ADMIN — OXYCODONE HYDROCHLORIDE 10 MG: 10 TABLET ORAL at 06:19

## 2017-09-09 RX ADMIN — CEFAZOLIN SODIUM 2 G: 2 INJECTION, SOLUTION INTRAVENOUS at 01:33

## 2017-09-09 RX ADMIN — Medication 325 MG: at 17:24

## 2017-09-09 RX ADMIN — OXYCODONE HYDROCHLORIDE 10 MG: 10 TABLET ORAL at 21:09

## 2017-09-09 RX ADMIN — OMEPRAZOLE 20 MG: 20 CAPSULE, DELAYED RELEASE ORAL at 10:02

## 2017-09-09 RX ADMIN — OXYCODONE HYDROCHLORIDE 10 MG: 10 TABLET ORAL at 10:03

## 2017-09-09 RX ADMIN — INSULIN LISPRO 2 UNITS: 100 INJECTION, SOLUTION INTRAVENOUS; SUBCUTANEOUS at 17:09

## 2017-09-09 RX ADMIN — MORPHINE SULFATE 3 MG: 4 INJECTION INTRAVENOUS at 17:29

## 2017-09-09 RX ADMIN — STANDARDIZED SENNA CONCENTRATE AND DOCUSATE SODIUM 2 TABLET: 8.6; 5 TABLET, FILM COATED ORAL at 10:02

## 2017-09-09 RX ADMIN — OXYCODONE HYDROCHLORIDE 10 MG: 10 TABLET ORAL at 14:07

## 2017-09-09 RX ADMIN — CEFAZOLIN SODIUM 2 G: 2 INJECTION, SOLUTION INTRAVENOUS at 10:07

## 2017-09-09 RX ADMIN — CEFAZOLIN SODIUM 2 G: 2 INJECTION, SOLUTION INTRAVENOUS at 19:09

## 2017-09-09 RX ADMIN — GABAPENTIN 300 MG: 300 CAPSULE ORAL at 10:02

## 2017-09-09 RX ADMIN — POTASSIUM CHLORIDE 20 MEQ: 1500 TABLET, EXTENDED RELEASE ORAL at 21:09

## 2017-09-09 RX ADMIN — POTASSIUM CHLORIDE 20 MEQ: 1500 TABLET, EXTENDED RELEASE ORAL at 10:04

## 2017-09-09 RX ADMIN — CETIRIZINE HYDROCHLORIDE 10 MG: 10 TABLET, FILM COATED ORAL at 10:03

## 2017-09-09 RX ADMIN — GABAPENTIN 300 MG: 300 CAPSULE ORAL at 21:09

## 2017-09-09 RX ADMIN — LOSARTAN POTASSIUM 50 MG: 50 TABLET, FILM COATED ORAL at 10:03

## 2017-09-09 RX ADMIN — SODIUM CHLORIDE 500 ML: 9 INJECTION, SOLUTION INTRAVENOUS at 19:14

## 2017-09-09 RX ADMIN — SULFAMETHOXAZOLE AND TRIMETHOPRIM 320 MG: 80; 16 INJECTION, SOLUTION, CONCENTRATE INTRAVENOUS at 22:07

## 2017-09-09 ASSESSMENT — PAIN SCALES - GENERAL
PAINLEVEL_OUTOF10: 9
PAINLEVEL_OUTOF10: 9
PAINLEVEL_OUTOF10: 3
PAINLEVEL_OUTOF10: 8
PAINLEVEL_OUTOF10: 3
PAINLEVEL_OUTOF10: 10
PAINLEVEL_OUTOF10: 7
PAINLEVEL_OUTOF10: 3

## 2017-09-09 ASSESSMENT — ENCOUNTER SYMPTOMS
DIAPHORESIS: 0
NAUSEA: 0
PND: 0
BLURRED VISION: 0
WEAKNESS: 1
MYALGIAS: 0
SENSORY CHANGE: 0
RESPIRATORY NEGATIVE: 1
HEMOPTYSIS: 0
PALPITATIONS: 0
FEVER: 0
DIZZINESS: 0
EYE PAIN: 0
WHEEZING: 0
SHORTNESS OF BREATH: 0
ABDOMINAL PAIN: 0
SPEECH CHANGE: 0
GASTROINTESTINAL NEGATIVE: 1
COUGH: 0
HEADACHES: 0
CHILLS: 0
EYES NEGATIVE: 1
SORE THROAT: 0
FOCAL WEAKNESS: 0
VOMITING: 0
DIARRHEA: 0
CARDIOVASCULAR NEGATIVE: 1
ORTHOPNEA: 0
DOUBLE VISION: 0
CONSTIPATION: 0

## 2017-09-09 NOTE — PROGRESS NOTES
Infectious Disease Progress Note    Author: Kori Lewis M.D. Date & Time created: 9/9/2017  1:25 PM    Interval History:  POD # 5 - L I&D right knee, poly exchange  Cefazolin Day #5/ po Bactrim: Day #3 ( added 9/6)    Referrals out to SNFS.  Denied by a few due to lack of Medicaid beds.  Niece passed away this am due to liver failure.    Labs Reviewed, Medications Reviewed, Fluids Reviewed and GI Nutrition Reviewed    Review of Systems:  Review of Systems   Constitutional: Negative for chills.   HENT: Negative.    Eyes: Negative.    Respiratory: Negative.    Cardiovascular: Negative.    Gastrointestinal: Negative.    Genitourinary: Negative.    Musculoskeletal: Negative for joint pain.        Right knee pain.   Neurological: Positive for weakness.       Physical Exam:  Physical Exam   Constitutional: She is oriented to person, place, and time. She appears well-developed and well-nourished.   Eyes: Pupils are equal, round, and reactive to light.   Cardiovascular: Normal rate and regular rhythm.    Pulmonary/Chest: Effort normal and breath sounds normal.   Abdominal: Soft. Bowel sounds are normal.   Musculoskeletal:   Right knee in surgical dressing with ACE wrap. No drainage from incision. Good pedal pulses on right.   Neurological: She is alert and oriented to person, place, and time.       Labs:  Recent Results (from the past 24 hour(s))   ACCU-CHEK GLUCOSE    Collection Time: 09/08/17  9:41 PM   Result Value Ref Range    Glucose - Accu-Ck 171 (H) 65 - 99 mg/dL   HEMOGLOBIN AND HEMATOCRIT    Collection Time: 09/09/17  3:55 AM   Result Value Ref Range    Hemoglobin 7.6 (L) 12.0 - 16.0 g/dL    Hematocrit 23.3 (L) 37.0 - 47.0 %   ACCU-CHEK GLUCOSE    Collection Time: 09/09/17  6:14 AM   Result Value Ref Range    Glucose - Accu-Ck 104 (H) 65 - 99 mg/dL     Results     Procedure Component Value Units Date/Time    BLOOD CULTURE x2 [[id number]] Collected:  09/03/17 2026    Order Status:  Completed Specimen:  Blood from  "Peripheral Updated:  09/08/17 2100     Significant Indicator NEG     Source BLD     Site PERIPHERAL     Blood Culture No growth after 5 days of incubation.    Narrative:       Per Hospital Policy: Only change Specimen Src: to \"Line\" if  specified by physician order.    CULTURE TISSUE W/ GRM STAIN [[id number]] Collected:  09/03/17 2327    Order Status:  Completed Specimen:  Tissue Updated:  09/08/17 1545     Gram Stain Result --     Few WBCs.  No organisms seen.       Significant Indicator NEG     Source TISS     Site Right Knee     Tissue Culture No growth at 72 hours.    ANAEROBIC CULTURE [[id number]] Collected:  09/03/17 2327    Order Status:  Completed Specimen:  Tissue Updated:  09/08/17 1545     Significant Indicator NEG     Source TISS     Site Right Knee     Anaerobic Culture, Culture Res No Anaerobes isolated.    CULTURE WOUND W/ GRAM STAIN [[id number]]  (Abnormal)  (Susceptibility) Collected:  09/03/17 2324    Order Status:  Completed Specimen:  Wound Updated:  09/08/17 1536     Significant Indicator POS (POS)     Source WND     Site Right Knee Synovium     Culture Result Wound -- (A)     Growth noted after further incubation,see below for  organism identification.       Gram Stain Result --     Rare WBCs.  No organisms seen.       Culture Result Wound -- (A)     Stenotrophomonas (X.) maltophilia  Rare growth      Narrative:       CALL  [place]  131 tel. [phone],  CALLED  131 tel. [phone] 09/06/2017, 10:05, RB PERF. RESULTS CALLED TO:  [id number] RN    Culture & Susceptibility     STENOTROPHOMONAS (X.) MALTOPHILIA     Antibiotic Sensitivity Microscan Unit Status    Ceftazidime Resistant >16 mcg/mL Final    Levofloxacin Sensitive <=2 mcg/mL Final    Trimeth/Sulfa Sensitive <=2/38 mcg/mL Final                       ANAEROBIC CULTURE [[id number]] Collected:  09/03/17 2324    Order Status:  Completed Specimen:  Wound Updated:  09/08/17 1536     Significant Indicator NEG     Source WND     Site Right Knee Synovium     " "Anaerobic Culture, Culture Res No Anaerobes isolated.    Narrative:       CALL  [place]  131 tel. [phone],  CALLED  131 tel. [phone] 09/06/2017, 10:05, RB PERF. RESULTS CALLED TO:  [id number] RN    FLUID CULTURE W/GRAM STAIN [[id number]] Collected:  09/03/17 1856    Order Status:  Completed Specimen:  Synovial from Synovial Fluid Updated:  09/06/17 0934     Gram Stain Result --     No organisms seen.  Few WBCs.       Significant Indicator NEG     Source SYNO     Site Right Knee     Culture Result Bdf No growth at 72 hours.    Narrative:       Right knee stat synovial fluid gram stain and culture  - call  results to Dr Russell [phone]    BLOOD CULTURE x2 [[id number]]  (Abnormal) Collected:  09/03/17 1918    Order Status:  Completed Specimen:  Blood from Peripheral Updated:  09/06/17 0823     Significant Indicator POS (POS)     Source BLD     Site PERIPHERAL     Blood Culture Growth detected by Bactec instrument. (A)     Blood Culture -- (A)     Viridans streptococcus - possible contaminant  Isolated from one bottle only, possible skin contaminant  please correlate with clinical condition.      Narrative:       CALL  [place]  131 tel. [phone],  CALLED  131 tel. [phone] 09/04/2017, 07:23, RB PERF. RESULTS CALLED  TO:Lory [id number] Rn  Per Hospital Policy: Only change Specimen Src: to \"Line\" if  specified by physician order.    BLOOD CULTURE [[id number]] Collected:  09/04/17 1204    Order Status:  Completed Specimen:  Blood from Peripheral Updated:  09/05/17 0935     Significant Indicator NEG     Source BLD     Site PERIPHERAL     Blood Culture --     No Growth    Note: Blood cultures are incubated for 5 days and  are monitored continuously.Positive blood cultures  are called to the RN and reported as soon as  they are identified.      Narrative:       Per Hospital Policy: Only change Specimen Src: to \"Line\" if  specified by physician order.    BLOOD CULTURE [[id number]] Collected:  09/04/17 1213    Order Status:  Completed " "Specimen:  Blood from Peripheral Updated:  09/05/17 0935     Significant Indicator NEG     Source BLD     Site PERIPHERAL     Blood Culture --     No Growth    Note: Blood cultures are incubated for 5 days and  are monitored continuously.Positive blood cultures  are called to the RN and reported as soon as  they are identified.      Narrative:       Per Hospital Policy: Only change Specimen Src: to \"Line\" if  specified by physician order.    GRAM STAIN [[id number]] Collected:  09/03/17 2324    Order Status:  Completed Specimen:  Wound Updated:  09/04/17 0755     Significant Indicator .     Source WND     Site Right Knee Synovium     Gram Stain Result --     Rare WBCs.  No organisms seen.      GRAM STAIN [[id number]] Collected:  09/03/17 2327    Order Status:  Completed Specimen:  Tissue Updated:  09/04/17 0755     Significant Indicator .     Source TISS     Site Right Knee     Gram Stain Result --     Few WBCs.  No organisms seen.      URINALYSIS [[id number]] Collected:  09/04/17 0715    Order Status:  Completed Specimen:  Urine from Urine, Clean Catch Updated:  09/04/17 0753     Color Yellow     Character Clear     Specific Gravity 1.016     Ph 5.5     Glucose Negative mg/dL      Ketones Negative mg/dL      Protein Negative mg/dL      Bilirubin Negative     Urobilinogen, Urine 1.0     Nitrite Negative     Leukocyte Esterase Negative     Occult Blood Negative     Micro Urine Req see below     Comment: Microscopic examination not performed when specimen is clear  and chemically negative for protein, blood, leukocyte esterase  and nitrite.         Narrative:       Collected By:[id number] ALEX COMER    GRAM STAIN [[id number]] Collected:  09/03/17 1856    Order Status:  Completed Specimen:  Synovial Updated:  09/03/17 2105     Significant Indicator .     Source SYNO     Site Right Knee     Gram Stain Result --     No organisms seen.  Few WBCs.      Narrative:       Right knee stat synovial fluid gram stain and culture  " - call  results to Dr Russell [phone]        Hemodynamics:  Temp (24hrs), Av.9 °C (98.5 °F), Min:36.4 °C (97.6 °F), Max:37.4 °C (99.4 °F)  Temperature: 36.7 °C (98.1 °F)  Pulse  Av.3  Min: 56  Max: 95   Blood Pressure: 103/59     Central Line Group 1 (A) Left;Basilic Single Lumen;PICC;Power Injection Catheter 4 (Active)   Line Secured Taped;Transparent 2017  8:00 AM   Patency and Function Check Performed at Beginning of Shift 2017  8:00 AM   Line Necessity Assessed Antibiotic Therapy Greater than 7 Days 2017  8:00 AM   Consider Removal of Femoral Line Not Applicable 2017  8:00 AM   Closed Tubing Set Up Yes 2017  8:00 AM   Hand Washing / Gloves Prior to Every Access Yes 2017  8:00 AM   Next Daily Chlorhexidine Bath Due (Regional ONLY) 17  8:00 AM   Site Condition / Description Assessed;Patent;Clean;Dry;Intact 2017  8:00 AM   Signs and Symptoms of Infection None Apparent at this Time 2017  8:00 AM   Dressing Type / Description Antimicrobial Patch (BioPatch);Tegaderm Advanced;Clean;Dry;Intact 2017  8:00 AM   Dressing Status Observed 2017  8:00 AM   Next Dressing Change  17  8:00 AM   Date Primary Tubing Changed 17  8:00 PM   Date Secondary Tubing Changed 17  8:00 AM   NEXT Primary Tubing Change  17  8:00 AM   NEXT Secondary Tubing Change  17  8:00 AM   NEXT IV Connector(s) Change Date 17  8:00 AM     Wound:  Surgical Incision  Incision Right Knee (Active)   Wound Bed Other (comment) 2017  8:00 AM   Drainage  None 2017  8:00 AM   Periwound Skin Normal 2017  8:00 AM   Daily - Wound Closure Not Assessed 2017  8:00 AM   Dressing Options Elastic Wrap 2017  8:00 AM   Dressing Status / Change Clean;Dry;Intact 2017  8:00 AM   Daily - Dressing Change Observed 2017  8:00 AM        Fluids:  Intake/Output       17 0700 - 17 0659 17  0700 - 09/09/17 0659 09/09/17 0700 - 09/10/17 0659      0700-1859 1900-0659 Total 0700-1859 1900-0659 Total 0700-1859 1900-0659 Total       Intake    P.O.  240  120 360  480  100 580  240  -- 240    P.O. 240 120 360 480 100 580 240 -- 240    I.V.  --  100 100  --  600 600  --  -- --    IV Piggyback Volume (IV Piggyback) -- 100 100 -- 100 100 -- -- --    IV Piggyback Volume (bactrim) -- -- -- -- 500 500 -- -- --    Total Intake 240 220 460  240 -- 240       Output    Urine  --  -- --  --  -- --  --  -- --    Number of Times Voided -- 1 x 1 x 2 x 4 x 6 x -- -- --    Stool  --  -- --  --  -- --  --  -- --    Number of Times Stooled 1 x 2 x 3 x 2 x -- 2 x -- -- --    Total Output -- -- -- -- -- -- -- -- --       Net I/O     240 220 460  240 -- 240           GI/Nutrition:  Orders Placed This Encounter   Procedures   • DIET ORDER     Standing Status:   Standing     Number of Occurrences:   1     Order Specific Question:   Diet:     Answer:   Diabetic [3]     Medications:  Current Facility-Administered Medications   Medication Last Dose   • ceFAZolin (ANCEF) IVPB 2 g     • sulfamethoxazole-trimethoprim (SEPTRA) 320 mg in D5W 500 mL IVPB Stopped at 09/09/17 1232   • ferrous sulfate tablet 325 mg 325 mg at 09/09/17 0619   • normal saline PF 10-20 mL     • potassium chloride SA (Kdur) tablet 20 mEq 20 mEq at 09/09/17 1004   • cetirizine (ZYRTEC) tablet 10 mg 10 mg at 09/09/17 1003   • fluticasone (FLONASE) nasal spray 50 mcg 50 mcg at 09/05/17 0822   • gabapentin (NEURONTIN) capsule 300 mg 300 mg at 09/09/17 1002   • zolpidem (AMBIEN) tablet 10 mg     • omeprazole (PRILOSEC) capsule 20 mg 20 mg at 09/09/17 1002   • acetaminophen (TYLENOL) tablet 650 mg 650 mg at 09/07/17 1101   • senna-docusate (PERICOLACE or SENOKOT S) 8.6-50 MG per tablet 2 Tab 2 Tab at 09/09/17 1002    And   • polyethylene glycol/lytes (MIRALAX) PACKET 1 Packet 1 Packet at 09/05/17 2033    And   • magnesium hydroxide (MILK OF  MAGNESIA) suspension 30 mL 30 mL at 09/06/17 1952    And   • bisacodyl (DULCOLAX) suppository 10 mg 10 mg at 09/07/17 1101   • insulin lispro (HUMALOG) injection 1-6 Units 2 Units at 09/09/17 1108   • glucose 4 g chewable tablet 16 g      And   • dextrose 50% (D50W) injection 25 mL     • oxycodone immediate release (ROXICODONE) tablet 10 mg 10 mg at 09/09/17 1003    Or   • oxycodone immediate-release (ROXICODONE) tablet 15 mg 15 mg at 09/08/17 0005   • morphine (pf) 4 mg/ml injection 3 mg 3 mg at 09/05/17 2349   • losartan (COZAAR) tablet 50 mg 50 mg at 09/09/17 1003     Medical Decision Making, by Problem:  Active Hospital Problems    Diagnosis   • Infection [B99.9]   • 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CMS-[facility]) [T84.59XA, Z96.659]   • History of rheumatic fever [Z86.79]   • Dysuria [R30.0]   • Cirrhosis, alcoholic (CMS-[facility]) [K70.30]   • Normocytic anemia [D64.9]   • History of hypertension [Z86.79]   • Type 2 diabetes mellitus with neurologic complication (CMS-[facility]) [E11.49]   • Diabetic polyneuropathy associated with type 2 diabetes mellitus (CMS-[facility]) [E11.42]     1.  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  2.  Diabetes mellitus type 2, uncontrolled.  3.  Osteoarthritis.  4.  Asthma.  5.  History of hepatitis C, status post treatment with cirrhosis.    Plan:  Cultures from the knee with growth of Stenotrophomonas. Unusual organim but only finding. Strep from blood reported as contaminant by lab. Was additionally not reproduced in repeat blood culture. Had been on therapy though.  She states the wound was clearly draining purulent fluid at first then was more bloody.  Must assume infection there. Will initiate Bactrim IV and continue ancef. Could consider Ertapenem for once a day dosing as substitute foe ancef on an outpatient basis. Bactrim IV to remain as well.  Anticipate 6 weeks of therapy from poly exchange.    Dispo: Will be difficult. She  lives with her dtr, but her dtr and son in law both work.  She is not certain she could arrange reliable transportation to [facility] for IV therapy.  Signed choice form to SNF.  Awaiting acceptance, but needs Medicaid bed.  This may be problematic    Discussed with pt and friend.  25 min >50% of time spent in coordination of care/counseling.

## 2017-09-09 NOTE — PROGRESS NOTES
Internal Medicine Interval Note    Name April Alicia     1960   Age/Sex 57 y.o. female   MRN [id number]   Code Status FULL     After 5PM or if no immediate response to page, please call for cross-coverage  Attending/Team: N[place] See Patient List for primary contact information  Call [phone] to page    1st Call - Day Intern (R1):   Dr. Wen 2nd Call - Day Sr. Resident (R2/R3):   Dr. Garcia         Reason for interval visit  (Principal Problem)   Infected right knee total arthroplasty s/p I&D with poly exchange    Interval Problem Daily Status Update  (24 hours)   POD #5 from I&D.  Knee pain well controlled w/ PRNs.   No fever/chills, CP, palp, SOB, N/V, diarrhea, abdominal or back pain.   Pt waiting for medicaid bed at [facility].        Review of Systems   Constitutional: Negative for chills, diaphoresis, fever and malaise/fatigue.   HENT: Negative for congestion and sore throat.    Eyes: Negative for blurred vision, double vision and pain.   Respiratory: Negative for cough, hemoptysis, shortness of breath and wheezing.    Cardiovascular: Negative for chest pain, palpitations, orthopnea, leg swelling and PND.   Gastrointestinal: Negative for abdominal pain, constipation, diarrhea, nausea and vomiting.   Genitourinary: Negative for dysuria.   Musculoskeletal: Negative for myalgias.   Skin: Negative for itching and rash.   Neurological: Negative for dizziness, sensory change, speech change, focal weakness and headaches.       Consultants/Specialty  Orthopedics  Infectious Disease    Disposition  Inpatient awaiting SNF bed    Quality Measures    Reviewed items::  Labs reviewed, Medications reviewed and Radiology images reviewed  Amaro catheter::  No Amaro  DVT prophylaxis - mechanical:  SCDs (lovenox stopped d/t low Hb)          Physical Exam       Vitals:    17 0400 17 0617 17 0800 17 1130   BP: (!) 99/51 102/61 107/55 103/59   Pulse: 71  80 63    Resp: 17 17 17   Temp: 36.6 °C (97.8 °F)  36.4 °C (97.6 °F) 36.7 °C (98.1 °F)   SpO2: 92%  95% 96%   Weight:       Height:         Body mass index is 30.01 kg/m².    Oxygen Therapy:  Pulse Oximetry: 96 %, O2 (LPM): 0, O2 Delivery: None (Room Air)    Physical Exam   Constitutional: She is oriented to person, place, and time and well-developed, well-nourished, and in no distress.   HENT:   Head: Normocephalic and atraumatic.   Mouth/Throat: No oropharyngeal exudate.   Eyes: EOM are normal. Pupils are equal, round, and reactive to light.   Neck: No tracheal deviation present.   Cardiovascular: Normal rate and regular rhythm.    Murmur (3/6 systolic decrescendo, S2 present) heard.  Pulmonary/Chest: Effort normal. No respiratory distress. She has no wheezes. She has no rales.   Abdominal: Soft. Bowel sounds are normal. There is no tenderness. There is no rebound and no guarding.   Musculoskeletal:   Right knee wrapped in ACE bandage. Minimal swelling. No drainage.   Neurological: She is alert and oriented to person, place, and time. No cranial nerve deficit.         Lab Data Review:         9/4/2017  11:38 AM    Recent Labs      09/07/17   0300 09/08/17   0428   SODIUM  135  138   POTASSIUM  4.3  4.1   CHLORIDE  108  110   CO2  23  23   BUN  12  10   CREATININE  0.62  0.73   CALCIUM  8.5  8.9       Recent Labs      09/07/17   0300  09/08/17   0428   GLUCOSE  104*  109*       Recent Labs      09/07/17   0300  09/08/17   0029  09/09/17   0355   RBC  2.61*  2.71*   --    HEMOGLOBIN  7.6*  7.9*  7.6*   HEMATOCRIT  23.9*  24.2*  23.3*   PLATELETCT  148*  141*   --        Recent Labs      09/07/17   0300  09/08/17   0029   WBC  3.6*  3.5*   NEUTSPOLYS  48.90   --    LYMPHOCYTES  31.90   --    MONOCYTES  11.40   --    EOSINOPHILS  6.10   --    BASOPHILS  1.10   --            Assessment/Plan     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CMS-HCC)- (present on admission)   Assessment & Plan    TKR on 8/21, developed  redness/swelling/pain x48hrs.   s/p I&D with poly exchange 9/4  Synovial fluid w/ 20,000 WBCs, 99% polys  Wound Cx grew Stenotrophomonas maltophilia; ID is following, appreciate recs.  Currently afebrile. No subj. chills or myalgias.  Plan:  Pain adequately controlled w/ Fabi 10-15mg q4h, with IV morphine 3mg q3h PRN.   Will need 6 weeks of abx; PICC in place.  Has been accepted at [facility], pending available medicaid bed. Per  she is accepted there for either IV Bactrim BID and IV Ancef TID, or IV Bactrim BID and Ertapenem qDay.  Currently on Ancef and double strength Bactrim; will change Ancef to Ertapenem as this is once per day and will be easier to give when pt goes to [facility].          Normocytic anemia- (present on admission)   Assessment & Plan    Baseline Hb 12-13. Has stabilized around 8.   Normocytic, likely anemia of chronic inflammatory disease, ? Iron deficiency.  Normocytic, normal RDW. Corrected retic count normal.   No obvious active bleed, no SOB, CP, abd/back pain, thigh pain or P/E changes. Blood loss from surgery was minimal, no drainage from wound currently.  fobt negative.   Plan:  - f/u daily H&H while inpt, transfuse for <7.0  - Stopped lovenox ppx, SCD in place on the left.  - Restarted patient 's home PO iron 9/7/2017        History of rheumatic fever- (present on admission)   Assessment & Plan    H/o rheumatic fever at age 26.    3/6 systolic murmur on exam, likely MR.   Echo shows no changes from 2008.  BCx 9/3 No growth, final result.    Infective endocarditis is unlikely.        Type 2 diabetes mellitus with neurologic complication (CMS-HCC)- (present on admission)   Assessment & Plan    Holding metformin to avoid metabolic acidosis.  Diabetic diet, ISS   A1c 5.5%        History of hypertension- (present on admission)   Assessment & Plan    As above, holding antihypertensives (losartan, felodipine).         Cirrhosis, alcoholic (CMS-HCC)- (present on admission)    Assessment & Plan    Previous U/S showed findings of cirrhosis w/ portal HTN; pt quit drinking 3 years ago. Normal LFTs, Tbili, Alb, plt. Amanda class A.  Propranolol that was started for variceal ppx was d/c'd in the setting of infection.

## 2017-09-09 NOTE — CARE PLAN
Problem: Pain Management  Goal: Pain level will decrease to patient's comfort goal  Patient complains of pain rated at 8/10.  She was medicated once and was able to rest comfortably.

## 2017-09-09 NOTE — CARE PLAN
Problem: Safety  Goal: Will remain free from falls  Patient is compliant with call bell and waits for staff assistance.

## 2017-09-10 LAB
GLUCOSE BLD-MCNC: 104 MG/DL (ref 65–99)
GLUCOSE BLD-MCNC: 121 MG/DL (ref 65–99)
GLUCOSE BLD-MCNC: 128 MG/DL (ref 65–99)
GLUCOSE BLD-MCNC: 142 MG/DL (ref 65–99)
HCT VFR BLD AUTO: 23.6 % (ref 37–47)
HGB BLD-MCNC: 7.8 G/DL (ref 12–16)

## 2017-09-10 PROCEDURE — 700102 HCHG RX REV CODE 250 W/ 637 OVERRIDE(OP): Performed by: STUDENT IN AN ORGANIZED HEALTH CARE EDUCATION/TRAINING PROGRAM

## 2017-09-10 PROCEDURE — A9270 NON-COVERED ITEM OR SERVICE: HCPCS | Performed by: ORTHOPAEDIC SURGERY

## 2017-09-10 PROCEDURE — 700111 HCHG RX REV CODE 636 W/ 250 OVERRIDE (IP): Performed by: STUDENT IN AN ORGANIZED HEALTH CARE EDUCATION/TRAINING PROGRAM

## 2017-09-10 PROCEDURE — 85018 HEMOGLOBIN: CPT

## 2017-09-10 PROCEDURE — 99232 SBSQ HOSP IP/OBS MODERATE 35: CPT | Mod: GC | Performed by: INTERNAL MEDICINE

## 2017-09-10 PROCEDURE — A9270 NON-COVERED ITEM OR SERVICE: HCPCS | Performed by: STUDENT IN AN ORGANIZED HEALTH CARE EDUCATION/TRAINING PROGRAM

## 2017-09-10 PROCEDURE — 82962 GLUCOSE BLOOD TEST: CPT | Mod: 91

## 2017-09-10 PROCEDURE — 700101 HCHG RX REV CODE 250: Performed by: INTERNAL MEDICINE

## 2017-09-10 PROCEDURE — 85014 HEMATOCRIT: CPT

## 2017-09-10 PROCEDURE — A9270 NON-COVERED ITEM OR SERVICE: HCPCS

## 2017-09-10 PROCEDURE — 770001 HCHG ROOM/CARE - MED/SURG/GYN PRIV*

## 2017-09-10 PROCEDURE — 700105 HCHG RX REV CODE 258: Performed by: INTERNAL MEDICINE

## 2017-09-10 PROCEDURE — 700102 HCHG RX REV CODE 250 W/ 637 OVERRIDE(OP): Performed by: HOSPITALIST

## 2017-09-10 PROCEDURE — A9270 NON-COVERED ITEM OR SERVICE: HCPCS | Performed by: HOSPITALIST

## 2017-09-10 PROCEDURE — 700102 HCHG RX REV CODE 250 W/ 637 OVERRIDE(OP)

## 2017-09-10 PROCEDURE — 700102 HCHG RX REV CODE 250 W/ 637 OVERRIDE(OP): Performed by: ORTHOPAEDIC SURGERY

## 2017-09-10 RX ADMIN — GABAPENTIN 300 MG: 300 CAPSULE ORAL at 09:37

## 2017-09-10 RX ADMIN — CETIRIZINE HYDROCHLORIDE 10 MG: 10 TABLET, FILM COATED ORAL at 09:38

## 2017-09-10 RX ADMIN — OMEPRAZOLE 20 MG: 20 CAPSULE, DELAYED RELEASE ORAL at 09:38

## 2017-09-10 RX ADMIN — SULFAMETHOXAZOLE AND TRIMETHOPRIM 320 MG: 80; 16 INJECTION, SOLUTION, CONCENTRATE INTRAVENOUS at 09:34

## 2017-09-10 RX ADMIN — CEFAZOLIN SODIUM 2 G: 2 INJECTION, SOLUTION INTRAVENOUS at 02:05

## 2017-09-10 RX ADMIN — OXYCODONE HYDROCHLORIDE 15 MG: 5 TABLET ORAL at 14:33

## 2017-09-10 RX ADMIN — Medication 325 MG: at 18:32

## 2017-09-10 RX ADMIN — GABAPENTIN 300 MG: 300 CAPSULE ORAL at 20:38

## 2017-09-10 RX ADMIN — OXYCODONE HYDROCHLORIDE 15 MG: 5 TABLET ORAL at 18:32

## 2017-09-10 RX ADMIN — Medication 325 MG: at 09:38

## 2017-09-10 RX ADMIN — CEFAZOLIN SODIUM 2 G: 2 INJECTION, SOLUTION INTRAVENOUS at 19:28

## 2017-09-10 RX ADMIN — GABAPENTIN 300 MG: 300 CAPSULE ORAL at 14:33

## 2017-09-10 RX ADMIN — OXYCODONE HYDROCHLORIDE 10 MG: 10 TABLET ORAL at 05:58

## 2017-09-10 RX ADMIN — OXYCODONE HYDROCHLORIDE 15 MG: 5 TABLET ORAL at 10:00

## 2017-09-10 RX ADMIN — POTASSIUM CHLORIDE 20 MEQ: 1500 TABLET, EXTENDED RELEASE ORAL at 09:38

## 2017-09-10 RX ADMIN — STANDARDIZED SENNA CONCENTRATE AND DOCUSATE SODIUM 2 TABLET: 8.6; 5 TABLET, FILM COATED ORAL at 09:38

## 2017-09-10 RX ADMIN — SULFAMETHOXAZOLE AND TRIMETHOPRIM 320 MG: 80; 16 INJECTION, SOLUTION, CONCENTRATE INTRAVENOUS at 21:32

## 2017-09-10 RX ADMIN — LOSARTAN POTASSIUM 50 MG: 50 TABLET, FILM COATED ORAL at 09:38

## 2017-09-10 RX ADMIN — STANDARDIZED SENNA CONCENTRATE AND DOCUSATE SODIUM 2 TABLET: 8.6; 5 TABLET, FILM COATED ORAL at 20:39

## 2017-09-10 RX ADMIN — POTASSIUM CHLORIDE 20 MEQ: 1500 TABLET, EXTENDED RELEASE ORAL at 20:39

## 2017-09-10 RX ADMIN — FLUTICASONE PROPIONATE 50 MCG: 50 SPRAY, METERED NASAL at 09:34

## 2017-09-10 RX ADMIN — OXYCODONE HYDROCHLORIDE 10 MG: 10 TABLET ORAL at 22:43

## 2017-09-10 RX ADMIN — CEFAZOLIN SODIUM 2 G: 2 INJECTION, SOLUTION INTRAVENOUS at 09:34

## 2017-09-10 RX ADMIN — OXYCODONE HYDROCHLORIDE 10 MG: 10 TABLET ORAL at 02:05

## 2017-09-10 ASSESSMENT — ENCOUNTER SYMPTOMS
BLURRED VISION: 0
VOMITING: 0
CHILLS: 0
WEAKNESS: 1
ABDOMINAL PAIN: 0
SPEECH CHANGE: 0
GASTROINTESTINAL NEGATIVE: 1
WHEEZING: 0
NAUSEA: 0
FOCAL WEAKNESS: 0
EYE PAIN: 0
FEVER: 0
EYES NEGATIVE: 1
SORE THROAT: 0
DIARRHEA: 0
CONSTIPATION: 0
DIAPHORESIS: 0
DIZZINESS: 0
CARDIOVASCULAR NEGATIVE: 1
PALPITATIONS: 0
DOUBLE VISION: 0
HEADACHES: 0
RESPIRATORY NEGATIVE: 1
ORTHOPNEA: 0
COUGH: 0
HEMOPTYSIS: 0
SHORTNESS OF BREATH: 0
SENSORY CHANGE: 0
MYALGIAS: 0
PND: 0

## 2017-09-10 ASSESSMENT — PATIENT HEALTH QUESTIONNAIRE - PHQ9
8. MOVING OR SPEAKING SO SLOWLY THAT OTHER PEOPLE COULD HAVE NOTICED. OR THE OPPOSITE, BEING SO FIGETY OR RESTLESS THAT YOU HAVE BEEN MOVING AROUND A LOT MORE THAN USUAL: NOT AT ALL
4. FEELING TIRED OR HAVING LITTLE ENERGY: NOT AT ALL
6. FEELING BAD ABOUT YOURSELF - OR THAT YOU ARE A FAILURE OR HAVE LET YOURSELF OR YOUR FAMILY DOWN: NOT AL ALL
SUM OF ALL RESPONSES TO PHQ9 QUESTIONS 1 AND 2: 0
5. POOR APPETITE OR OVEREATING: NOT AT ALL
3. TROUBLE FALLING OR STAYING ASLEEP OR SLEEPING TOO MUCH: NOT AT ALL
9. THOUGHTS THAT YOU WOULD BE BETTER OFF DEAD, OR OF HURTING YOURSELF: NOT AT ALL
1. LITTLE INTEREST OR PLEASURE IN DOING THINGS: NOT AT ALL
2. FEELING DOWN, DEPRESSED, IRRITABLE, OR HOPELESS: NOT AT ALL
SUM OF ALL RESPONSES TO PHQ QUESTIONS 1-9: 0
7. TROUBLE CONCENTRATING ON THINGS, SUCH AS READING THE NEWSPAPER OR WATCHING TELEVISION: NOT AT ALL

## 2017-09-10 ASSESSMENT — PAIN SCALES - GENERAL
PAINLEVEL_OUTOF10: 8
PAINLEVEL_OUTOF10: 7
PAINLEVEL_OUTOF10: 6
PAINLEVEL_OUTOF10: 8
PAINLEVEL_OUTOF10: 7

## 2017-09-10 ASSESSMENT — LIFESTYLE VARIABLES: DO YOU DRINK ALCOHOL: NO

## 2017-09-10 NOTE — PROGRESS NOTES
Infectious Disease Progress Note    Author: Kori Lewis M.D. Date & Time created: 9/10/2017  12:04 PM    Interval History:  POD # 6 - L I&D right knee, poly exchange  Cefazolin Day #6/ po Bactrim: Day #4( added 9/6)    Referrals out to SNFS.  Denied by a few due to lack of Medicaid beds.    Labs Reviewed, Medications Reviewed, Fluids Reviewed and GI Nutrition Reviewed    Review of Systems:  Review of Systems   Constitutional: Negative for chills.   HENT: Negative.    Eyes: Negative.    Respiratory: Negative.    Cardiovascular: Negative.    Gastrointestinal: Negative.    Genitourinary: Negative.    Musculoskeletal: Negative for joint pain.        Right knee pain.   Neurological: Positive for weakness.       Physical Exam:  Physical Exam   Constitutional: She is oriented to person, place, and time. She appears well-developed and well-nourished.   Eyes: Pupils are equal, round, and reactive to light.   Cardiovascular: Normal rate and regular rhythm.    Pulmonary/Chest: Effort normal and breath sounds normal.   Abdominal: Soft. Bowel sounds are normal.   Musculoskeletal:   Right knee in surgical dressing with ACE wrap. No drainage from incision. Good pedal pulses on right.   Neurological: She is alert and oriented to person, place, and time.       Labs:  Recent Results (from the past 24 hour(s))   ACCU-CHEK GLUCOSE    Collection Time: 09/09/17  5:05 PM   Result Value Ref Range    Glucose - Accu-Ck 205 (H) 65 - 99 mg/dL   ACCU-CHEK GLUCOSE    Collection Time: 09/09/17  9:08 PM   Result Value Ref Range    Glucose - Accu-Ck 127 (H) 65 - 99 mg/dL   HEMOGLOBIN AND HEMATOCRIT    Collection Time: 09/10/17  3:00 AM   Result Value Ref Range    Hemoglobin 7.8 (L) 12.0 - 16.0 g/dL    Hematocrit 23.6 (L) 37.0 - 47.0 %   ACCU-CHEK GLUCOSE    Collection Time: 09/10/17  5:57 AM   Result Value Ref Range    Glucose - Accu-Ck 104 (H) 65 - 99 mg/dL   ACCU-CHEK GLUCOSE    Collection Time: 09/10/17 11:00 AM   Result Value Ref Range     "Glucose - Accu-Ck 142 (H) 65 - 99 mg/dL     Results     Procedure Component Value Units Date/Time    BLOOD CULTURE [[id number]] Collected:  09/04/17 1204    Order Status:  Completed Specimen:  Blood from Peripheral Updated:  09/09/17 1500     Significant Indicator NEG     Source BLD     Site PERIPHERAL     Blood Culture No growth after 5 days of incubation.    Narrative:       Per Hospital Policy: Only change Specimen Src: to \"Line\" if  specified by physician order.    BLOOD CULTURE [[id number]] Collected:  09/04/17 1213    Order Status:  Completed Specimen:  Blood from Peripheral Updated:  09/09/17 1500     Significant Indicator NEG     Source BLD     Site PERIPHERAL     Blood Culture No growth after 5 days of incubation.    Narrative:       Per Hospital Policy: Only change Specimen Src: to \"Line\" if  specified by physician order.    BLOOD CULTURE x2 [[id number]] Collected:  09/03/17 2026    Order Status:  Completed Specimen:  Blood from Peripheral Updated:  09/08/17 2100     Significant Indicator NEG     Source BLD     Site PERIPHERAL     Blood Culture No growth after 5 days of incubation.    Narrative:       Per Hospital Policy: Only change Specimen Src: to \"Line\" if  specified by physician order.    CULTURE TISSUE W/ GRM STAIN [[id number]] Collected:  09/03/17 2327    Order Status:  Completed Specimen:  Tissue Updated:  09/08/17 1545     Gram Stain Result --     Few WBCs.  No organisms seen.       Significant Indicator NEG     Source TISS     Site Right Knee     Tissue Culture No growth at 72 hours.    ANAEROBIC CULTURE [[id number]] Collected:  09/03/17 2327    Order Status:  Completed Specimen:  Tissue Updated:  09/08/17 1545     Significant Indicator NEG     Source TISS     Site Right Knee     Anaerobic Culture, Culture Res No Anaerobes isolated.    CULTURE WOUND W/ GRAM STAIN [[id number]]  (Abnormal)  (Susceptibility) Collected:  09/03/17 2324    Order Status:  Completed Specimen:  Wound Updated:  09/08/17 1536     " Significant Indicator POS (POS)     Source WND     Site Right Knee Synovium     Culture Result Wound -- (A)     Growth noted after further incubation,see below for  organism identification.       Gram Stain Result --     Rare WBCs.  No organisms seen.       Culture Result Wound -- (A)     Stenotrophomonas (X.) maltophilia  Rare growth      Narrative:       CALL  [place]  131 tel. [phone],  CALLED  131 tel. [phone] 09/06/2017, 10:05, RB PERF. RESULTS CALLED TO:  [id number] RN    Culture & Susceptibility     STENOTROPHOMONAS (X.) MALTOPHILIA     Antibiotic Sensitivity Microscan Unit Status    Ceftazidime Resistant >16 mcg/mL Final    Levofloxacin Sensitive <=2 mcg/mL Final    Trimeth/Sulfa Sensitive <=2/38 mcg/mL Final                       ANAEROBIC CULTURE [[id number]] Collected:  09/03/17 2324    Order Status:  Completed Specimen:  Wound Updated:  09/08/17 1536     Significant Indicator NEG     Source WND     Site Right Knee Synovium     Anaerobic Culture, Culture Res No Anaerobes isolated.    Narrative:       CALL  [place]  131 tel. [phone],  CALLED  131 tel. [phone] 09/06/2017, 10:05, RB PERF. RESULTS CALLED TO:  [id number] RN    FLUID CULTURE W/GRAM STAIN [[id number]] Collected:  09/03/17 1856    Order Status:  Completed Specimen:  Synovial from Synovial Fluid Updated:  09/06/17 0934     Gram Stain Result --     No organisms seen.  Few WBCs.       Significant Indicator NEG     Source SYNO     Site Right Knee     Culture Result Bdf No growth at 72 hours.    Narrative:       Right knee stat synovial fluid gram stain and culture  - call  results to Dr Russell [phone]    BLOOD CULTURE x2 [[id number]]  (Abnormal) Collected:  09/03/17 1918    Order Status:  Completed Specimen:  Blood from Peripheral Updated:  09/06/17 0823     Significant Indicator POS (POS)     Source BLD     Site PERIPHERAL     Blood Culture Growth detected by Bactec instrument. (A)     Blood Culture -- (A)     Viridans streptococcus - possible  "contaminant  Isolated from one bottle only, possible skin contaminant  please correlate with clinical condition.      Narrative:       CALL  [place]  131 tel. [phone],  CALLED  131 tel. [phone] 2017, 07:23, RB PERF. RESULTS CALLED  TO:Lory [id number] Rn  Per Hospital Policy: Only change Specimen Src: to \"Line\" if  specified by physician order.    GRAM STAIN [[id number]] Collected:  17 2324    Order Status:  Completed Specimen:  Wound Updated:  17 075     Significant Indicator .     Source WND     Site Right Knee Synovium     Gram Stain Result --     Rare WBCs.  No organisms seen.      GRAM STAIN [[id number]] Collected:  17 232    Order Status:  Completed Specimen:  Tissue Updated:  17 075     Significant Indicator .     Source TISS     Site Right Knee     Gram Stain Result --     Few WBCs.  No organisms seen.      URINALYSIS [[id number]] Collected:  17 0715    Order Status:  Completed Specimen:  Urine from Urine, Clean Catch Updated:  17 0753     Color Yellow     Character Clear     Specific Gravity 1.016     Ph 5.5     Glucose Negative mg/dL      Ketones Negative mg/dL      Protein Negative mg/dL      Bilirubin Negative     Urobilinogen, Urine 1.0     Nitrite Negative     Leukocyte Esterase Negative     Occult Blood Negative     Micro Urine Req see below     Comment: Microscopic examination not performed when specimen is clear  and chemically negative for protein, blood, leukocyte esterase  and nitrite.         Narrative:       Collected By:[id number] ALEX COMER    GRAM STAIN [[id number]] Collected:  17 1856    Order Status:  Completed Specimen:  Synovial Updated:  17 210     Significant Indicator .     Source SYNO     Site Right Knee     Gram Stain Result --     No organisms seen.  Few WBCs.      Narrative:       Right knee stat synovial fluid gram stain and culture  - call  results to Dr Russell [phone]        Hemodynamics:  Temp (24hrs), Av.1 °C (98.7 " °F), Min:36.5 °C (97.7 °F), Max:37.7 °C (99.9 °F)  Temperature: 37.7 °C (99.9 °F)  Pulse  Av.4  Min: 56  Max: 95   Blood Pressure: 114/73     Central Line Group 1 (A) Left;Basilic Single Lumen;PICC;Power Injection Catheter 4 (Active)   Line Secured Taped;Transparent 9/10/2017  8:30 AM   Patency and Function Check Performed at Beginning of Shift 9/10/2017  8:30 AM   Line Necessity Assessed Antibiotic Therapy Greater than 7 Days 9/10/2017  8:30 AM   Consider Removal of Femoral Line Not Applicable 9/10/2017  8:30 AM   Closed Tubing Set Up Yes 9/10/2017  8:30 AM   Hand Washing / Gloves Prior to Every Access Yes 9/10/2017  8:30 AM   Next Daily Chlorhexidine Bath Due (Regional ONLY) 09/10/17 9/10/2017  8:30 AM   Site Condition / Description Assessed;Patent;Clean;Dry;Intact 9/10/2017  8:30 AM   Signs and Symptoms of Infection None Apparent at this Time 9/10/2017  8:30 AM   Dressing Type / Description Antimicrobial Patch (BioPatch);Tegaderm Advanced;Clean;Dry;Intact 9/10/2017  8:30 AM   Dressing Status Observed 9/10/2017  8:30 AM   Next Dressing Change  09/16/17 9/10/2017  8:30 AM   Date Primary Tubing Changed 17  8:00 PM   Date Secondary Tubing Changed 17  8:00 AM   NEXT Primary Tubing Change  17  8:00 AM   NEXT Secondary Tubing Change  17  8:00 AM   Date IV Connector(s) Changed 09/10/17 9/10/2017  6:14 AM   NEXT IV Connector(s) Change Date 09/14/17 9/10/2017  6:14 AM     Wound:  Surgical Incision  Incision Right Knee (Active)   Wound Bed Other (comment) 9/10/2017  8:10 AM   Drainage  None 9/10/2017  8:10 AM   Periwound Skin Other (Comments) 9/10/2017  8:10 AM   Daily - Wound Closure Not Assessed 9/10/2017  8:10 AM   Dressing Options Elastic Wrap 9/10/2017  8:10 AM   Dressing Status / Change Clean;Dry;Intact;Observed 9/10/2017  8:10 AM   Daily - Dressing Change Observed 9/10/2017  8:10 AM        Fluids:  Intake/Output       17 0700 - 17 0659  09/09/17 0700 - 09/10/17 0659 09/10/17 0700 - 09/11/17 0659      0700-1859 1900-0659 Total 0700-1859 1900-0659 Total 0700-1859 1900-0659 Total       Intake    P.O.  480  100 580  620  -- 620  --  -- --    P.O. 480 100 580 620 -- 620 -- -- --    I.V.  --  600 600  --  500 500  --  -- --    IV Piggyback Volume (IV Piggyback) -- 100 100 -- 500 500 -- -- --    IV Piggyback Volume (bactrim) -- 500 500 -- -- -- -- -- --    Total Intake   -- -- --       Output    Urine  --  -- --  --  -- --  --  -- --    Number of Times Voided 2 x 4 x 6 x 1 x 3 x 4 x -- -- --    Stool  --  -- --  --  -- --  --  -- --    Number of Times Stooled 2 x -- 2 x -- 0 x 0 x -- -- --    Total Output -- -- -- -- -- -- -- -- --       Net I/O       -- -- --           GI/Nutrition:  Orders Placed This Encounter   Procedures   • DIET ORDER     Standing Status:   Standing     Number of Occurrences:   1     Order Specific Question:   Diet:     Answer:   Diabetic [3]     Medications:  Current Facility-Administered Medications   Medication Last Dose   • ceFAZolin (ANCEF) IVPB 2 g 2 g at 09/10/17 0934   • sulfamethoxazole-trimethoprim (SEPTRA) 320 mg in D5W 500 mL IVPB Stopped at 09/10/17 1104   • ferrous sulfate tablet 325 mg 325 mg at 09/10/17 0938   • normal saline PF 10-20 mL     • potassium chloride SA (Kdur) tablet 20 mEq 20 mEq at 09/10/17 0938   • cetirizine (ZYRTEC) tablet 10 mg 10 mg at 09/10/17 0938   • fluticasone (FLONASE) nasal spray 50 mcg 50 mcg at 09/10/17 0934   • gabapentin (NEURONTIN) capsule 300 mg 300 mg at 09/10/17 0937   • zolpidem (AMBIEN) tablet 10 mg     • omeprazole (PRILOSEC) capsule 20 mg 20 mg at 09/10/17 0938   • acetaminophen (TYLENOL) tablet 650 mg 650 mg at 09/07/17 1101   • senna-docusate (PERICOLACE or SENOKOT S) 8.6-50 MG per tablet 2 Tab 2 Tab at 09/10/17 0938    And   • polyethylene glycol/lytes (MIRALAX) PACKET 1 Packet 1 Packet at 09/05/17 2033    And   • magnesium  hydroxide (MILK OF MAGNESIA) suspension 30 mL 30 mL at 09/06/17 1952    And   • bisacodyl (DULCOLAX) suppository 10 mg 10 mg at 09/07/17 1101   • insulin lispro (HUMALOG) injection 1-6 Units Stopped at 09/09/17 2100   • glucose 4 g chewable tablet 16 g      And   • dextrose 50% (D50W) injection 25 mL     • oxycodone immediate release (ROXICODONE) tablet 10 mg 10 mg at 09/10/17 0558    Or   • oxycodone immediate-release (ROXICODONE) tablet 15 mg 15 mg at 09/10/17 1000   • morphine (pf) 4 mg/ml injection 3 mg 3 mg at 09/09/17 1729   • losartan (COZAAR) tablet 50 mg 50 mg at 09/10/17 0938     Medical Decision Making, by Problem:  Active Hospital Problems    Diagnosis   • Infection [B99.9]   • 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CMS-[facility]) [T84.59XA, Z96.659]   • History of rheumatic fever [Z86.79]   • Dysuria [R30.0]   • Cirrhosis, alcoholic (CMS-[facility]) [K70.30]   • Normocytic anemia [D64.9]   • History of hypertension [Z86.79]   • Type 2 diabetes mellitus with neurologic complication (CMS-[facility]) [E11.49]   • Diabetic polyneuropathy associated with type 2 diabetes mellitus (CMS-[facility]) [E11.42]     1.  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  2.  Diabetes mellitus type 2, uncontrolled.  3.  Osteoarthritis.  4.  Asthma.  5.  History of hepatitis C, status post treatment with cirrhosis.    Plan:  Cultures from the knee with growth of Stenotrophomonas. Unusual organim but only finding. Strep from blood reported as contaminant by lab. Was additionally not reproduced in repeat blood culture. Had been on therapy though.  She states the wound was clearly draining purulent fluid at first then was more bloody.  Must assume infection there. Will initiate Bactrim  and continue ancef. Could consider Ertapenem for once a day dosing as substitute for ancef on an outpatient basis. ( Ertapenem is more expensive though ,and if it is a barrier to her going to SNF, keep the  Ancef)  Anticipate 6 weeks of therapy from poly exchange.    Dispo: Will be difficult. She lives with her dtr, but her dtr and son in law both work.  She is not certain she could arrange reliable transportation to [facility] for IV therapy.  Signed choice form to SNF.  Awaiting acceptance, but needs Medicaid bed.  This may be problematic    Discussed with pt and friend.  25 min >50% of time spent in coordination of care/counseling.

## 2017-09-10 NOTE — PROGRESS NOTES
Patient calls for assist. PICC left upper arm patent. PO pain medications. Plan will be going to skilled.

## 2017-09-10 NOTE — PROGRESS NOTES
Internal Medicine Interval Note    Name pAril Alicia     1960   Age/Sex 57 y.o. female   MRN [id number]   Code Status FULL     After 5PM or if no immediate response to page, please call for cross-coverage  Attending/Team: Nury See Patient List for primary contact information  Call [phone] to page    1st Call - Day Intern (R1):   Dr. Wen 2nd Call - Day Sr. Resident (R2/R3):   Dr. Garcia         Reason for interval visit  (Principal Problem)   Infected right knee total arthroplasty s/p I&D with poly exchange    Interval Problem Daily Status Update  (24 hours)   POD #6 from I&D.  Knee pain well controlled w/ PRNs.   No fever/chills, CP, palp, SOB, N/V, diarrhea, abdominal or back pain.   Pt waiting for medicaid bed at [facility].        Review of Systems   Constitutional: Negative for chills, diaphoresis, fever and malaise/fatigue.   HENT: Negative for congestion and sore throat.    Eyes: Negative for blurred vision, double vision and pain.   Respiratory: Negative for cough, hemoptysis, shortness of breath and wheezing.    Cardiovascular: Negative for chest pain, palpitations, orthopnea, leg swelling and PND.   Gastrointestinal: Negative for abdominal pain, constipation, diarrhea, nausea and vomiting.   Genitourinary: Negative for dysuria.   Musculoskeletal: Negative for myalgias.   Skin: Negative for itching and rash.   Neurological: Negative for dizziness, sensory change, speech change, focal weakness and headaches.       Consultants/Specialty  Orthopedics  Infectious Disease    Disposition  Inpatient awaiting SNF bed    Quality Measures    Reviewed items::  Labs reviewed, Medications reviewed and Radiology images reviewed  Amaro catheter::  No Amaro  DVT prophylaxis - mechanical:  SCDs (lovenox stopped d/t low Hb)          Physical Exam       Vitals:    17 1556 17 1927 09/10/17 0433 09/10/17 0733   BP: 139/68 111/62 (!) 95/54 (!) 99/65   Pulse: 75 75 74 68    Resp: 17 17 16 16   Temp: 36.5 °C (97.7 °F) 37.1 °C (98.8 °F) 36.8 °C (98.2 °F) 37.3 °C (99.1 °F)   SpO2: 95% 96% 91% 92%   Weight:       Height:         Body mass index is 30.01 kg/m².    Oxygen Therapy:  Pulse Oximetry: 92 %, O2 (LPM): 0, O2 Delivery: None (Room Air)    Physical Exam   Constitutional: She is oriented to person, place, and time and well-developed, well-nourished, and in no distress.   HENT:   Head: Normocephalic and atraumatic.   Mouth/Throat: No oropharyngeal exudate.   Eyes: EOM are normal. Pupils are equal, round, and reactive to light.   Neck: No tracheal deviation present.   Cardiovascular: Normal rate and regular rhythm.    Murmur (3/6 systolic decrescendo, S2 present) heard.  Pulmonary/Chest: Effort normal. No respiratory distress. She has no wheezes. She has no rales.   Abdominal: Soft. Bowel sounds are normal. There is no tenderness. There is no rebound and no guarding.   Musculoskeletal:   Right knee wrapped in ACE bandage. Minimal swelling. No drainage.   Neurological: She is alert and oriented to person, place, and time. No cranial nerve deficit.         Lab Data Review:         9/4/2017  11:38 AM    Recent Labs      09/08/17   0428   SODIUM  138   POTASSIUM  4.1   CHLORIDE  110   CO2  23   BUN  10   CREATININE  0.73   CALCIUM  8.9       Recent Labs      09/08/17   0428   GLUCOSE  109*       Recent Labs      09/08/17   0029  09/09/17   0355  09/10/17   0300   RBC  2.71*   --    --    HEMOGLOBIN  7.9*  7.6*  7.8*   HEMATOCRIT  24.2*  23.3*  23.6*   PLATELETCT  141*   --    --        Recent Labs      09/08/17   0029   WBC  3.5*           Assessment/Plan     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CMS-HCC)- (present on admission)   Assessment & Plan    TKR on 8/21, developed redness/swelling/pain x48hrs.   s/p I&D with poly exchange 9/4  Synovial fluid w/ 20,000 WBCs, 99% polys  Wound Cx grew Stenotrophomonas maltophilia; ID is following, appreciate recs.  Currently afebrile. No subj. chills or  myalgias.  Plan:  Pain adequately controlled w/ Fabi 10-15mg q4h, with IV morphine 3mg q3h PRN.   Will need 6 weeks of abx; PICC in place.  Has been accepted at [facility], pending available medicaid bed. Per CM she is accepted there for either IV Bactrim BID and IV Ancef TID, or IV Bactrim BID and Ertapenem qDay.  Currently on Ancef and double strength Bactrim; will change Ancef to Ertapenem as this is once per day and will be easier to give when pt goes to [facility].          Normocytic anemia- (present on admission)   Assessment & Plan    Baseline Hb 12-13. Has stabilized around 8.   Normocytic, likely anemia of chronic inflammatory disease, ? Iron deficiency.  Normocytic, normal RDW. Corrected retic count normal.   No obvious active bleed, no SOB, CP, abd/back pain, thigh pain or P/E changes. Blood loss from surgery was minimal, no drainage from wound currently.  fobt negative.   Plan:  - f/u daily H&H while inpt, transfuse for <7.0  - Stopped lovenox ppx, SCD in place on the left.  - Restarted patient 's home PO iron 9/7/2017        History of rheumatic fever- (present on admission)   Assessment & Plan    H/o rheumatic fever at age 26.    3/6 systolic murmur on exam, likely MR.   Echo shows no changes from 2008.  BCx 9/3 No growth, final result.    Infective endocarditis is unlikely.        Type 2 diabetes mellitus with neurologic complication (CMS-HCC)- (present on admission)   Assessment & Plan    Holding metformin to avoid metabolic acidosis.  Diabetic diet, ISS   A1c 5.5%        History of hypertension- (present on admission)   Assessment & Plan    As above, holding antihypertensives (losartan, felodipine).         Cirrhosis, alcoholic (CMS-HCC)- (present on admission)   Assessment & Plan    Previous U/S showed findings of cirrhosis w/ portal HTN; pt quit drinking 3 years ago. Normal LFTs, Tbili, Alb, plt. Amanda class A.  Propranolol that was started for variceal ppx was d/c'd in the setting of  infection.

## 2017-09-10 NOTE — DISCHARGE PLANNING
Medical Social Work    SW left  for [facility] to see if they have a bed open for pt today. Awaiting response.

## 2017-09-11 LAB
GLUCOSE BLD-MCNC: 101 MG/DL (ref 65–99)
GLUCOSE BLD-MCNC: 108 MG/DL (ref 65–99)
GLUCOSE BLD-MCNC: 156 MG/DL (ref 65–99)
HCT VFR BLD AUTO: 25.4 % (ref 37–47)
HGB BLD-MCNC: 8.1 G/DL (ref 12–16)

## 2017-09-11 PROCEDURE — 700111 HCHG RX REV CODE 636 W/ 250 OVERRIDE (IP): Performed by: STUDENT IN AN ORGANIZED HEALTH CARE EDUCATION/TRAINING PROGRAM

## 2017-09-11 PROCEDURE — 82962 GLUCOSE BLOOD TEST: CPT

## 2017-09-11 PROCEDURE — 700101 HCHG RX REV CODE 250: Performed by: INTERNAL MEDICINE

## 2017-09-11 PROCEDURE — 700105 HCHG RX REV CODE 258: Performed by: INTERNAL MEDICINE

## 2017-09-11 PROCEDURE — 700102 HCHG RX REV CODE 250 W/ 637 OVERRIDE(OP): Performed by: ORTHOPAEDIC SURGERY

## 2017-09-11 PROCEDURE — 700102 HCHG RX REV CODE 250 W/ 637 OVERRIDE(OP): Performed by: STUDENT IN AN ORGANIZED HEALTH CARE EDUCATION/TRAINING PROGRAM

## 2017-09-11 PROCEDURE — 700102 HCHG RX REV CODE 250 W/ 637 OVERRIDE(OP)

## 2017-09-11 PROCEDURE — A9270 NON-COVERED ITEM OR SERVICE: HCPCS | Performed by: STUDENT IN AN ORGANIZED HEALTH CARE EDUCATION/TRAINING PROGRAM

## 2017-09-11 PROCEDURE — A9270 NON-COVERED ITEM OR SERVICE: HCPCS

## 2017-09-11 PROCEDURE — 85014 HEMATOCRIT: CPT

## 2017-09-11 PROCEDURE — 770001 HCHG ROOM/CARE - MED/SURG/GYN PRIV*

## 2017-09-11 PROCEDURE — A9270 NON-COVERED ITEM OR SERVICE: HCPCS | Performed by: HOSPITALIST

## 2017-09-11 PROCEDURE — A9270 NON-COVERED ITEM OR SERVICE: HCPCS | Performed by: ORTHOPAEDIC SURGERY

## 2017-09-11 PROCEDURE — 700105 HCHG RX REV CODE 258

## 2017-09-11 PROCEDURE — 97535 SELF CARE MNGMENT TRAINING: CPT

## 2017-09-11 PROCEDURE — 99232 SBSQ HOSP IP/OBS MODERATE 35: CPT | Mod: GC | Performed by: INTERNAL MEDICINE

## 2017-09-11 PROCEDURE — 700102 HCHG RX REV CODE 250 W/ 637 OVERRIDE(OP): Performed by: HOSPITALIST

## 2017-09-11 PROCEDURE — 85018 HEMOGLOBIN: CPT

## 2017-09-11 RX ORDER — SODIUM CHLORIDE 9 MG/ML
INJECTION, SOLUTION INTRAVENOUS
Status: COMPLETED
Start: 2017-09-11 | End: 2017-09-11

## 2017-09-11 RX ORDER — DEXTROSE AND SODIUM CHLORIDE 5; .45 G/100ML; G/100ML
INJECTION, SOLUTION INTRAVENOUS
Status: COMPLETED
Start: 2017-09-11 | End: 2017-09-11

## 2017-09-11 RX ADMIN — POTASSIUM CHLORIDE 20 MEQ: 1500 TABLET, EXTENDED RELEASE ORAL at 20:05

## 2017-09-11 RX ADMIN — OXYCODONE HYDROCHLORIDE 15 MG: 5 TABLET ORAL at 15:28

## 2017-09-11 RX ADMIN — SODIUM CHLORIDE 500 ML: 9 INJECTION, SOLUTION INTRAVENOUS at 20:40

## 2017-09-11 RX ADMIN — LOSARTAN POTASSIUM 50 MG: 50 TABLET, FILM COATED ORAL at 10:35

## 2017-09-11 RX ADMIN — DEXTROSE AND SODIUM CHLORIDE: 5; .45 INJECTION, SOLUTION INTRAVENOUS at 13:30

## 2017-09-11 RX ADMIN — GABAPENTIN 300 MG: 300 CAPSULE ORAL at 10:35

## 2017-09-11 RX ADMIN — CETIRIZINE HYDROCHLORIDE 10 MG: 10 TABLET, FILM COATED ORAL at 10:35

## 2017-09-11 RX ADMIN — OXYCODONE HYDROCHLORIDE 10 MG: 10 TABLET ORAL at 07:05

## 2017-09-11 RX ADMIN — SULFAMETHOXAZOLE AND TRIMETHOPRIM 320 MG: 80; 16 INJECTION, SOLUTION, CONCENTRATE INTRAVENOUS at 10:37

## 2017-09-11 RX ADMIN — CEFAZOLIN SODIUM 2 G: 2 INJECTION, SOLUTION INTRAVENOUS at 03:01

## 2017-09-11 RX ADMIN — STANDARDIZED SENNA CONCENTRATE AND DOCUSATE SODIUM 2 TABLET: 8.6; 5 TABLET, FILM COATED ORAL at 20:06

## 2017-09-11 RX ADMIN — GABAPENTIN 300 MG: 300 CAPSULE ORAL at 17:27

## 2017-09-11 RX ADMIN — OMEPRAZOLE 20 MG: 20 CAPSULE, DELAYED RELEASE ORAL at 10:35

## 2017-09-11 RX ADMIN — OXYCODONE HYDROCHLORIDE 15 MG: 5 TABLET ORAL at 10:34

## 2017-09-11 RX ADMIN — STANDARDIZED SENNA CONCENTRATE AND DOCUSATE SODIUM 2 TABLET: 8.6; 5 TABLET, FILM COATED ORAL at 10:37

## 2017-09-11 RX ADMIN — CEFAZOLIN SODIUM 2 G: 2 INJECTION, SOLUTION INTRAVENOUS at 13:00

## 2017-09-11 RX ADMIN — Medication 325 MG: at 07:06

## 2017-09-11 RX ADMIN — GABAPENTIN 300 MG: 300 CAPSULE ORAL at 20:06

## 2017-09-11 RX ADMIN — OXYCODONE HYDROCHLORIDE 15 MG: 5 TABLET ORAL at 20:05

## 2017-09-11 RX ADMIN — SULFAMETHOXAZOLE AND TRIMETHOPRIM 320 MG: 80; 16 INJECTION, SOLUTION, CONCENTRATE INTRAVENOUS at 21:40

## 2017-09-11 RX ADMIN — Medication 325 MG: at 17:27

## 2017-09-11 RX ADMIN — CEFAZOLIN SODIUM 2 G: 2 INJECTION, SOLUTION INTRAVENOUS at 20:11

## 2017-09-11 RX ADMIN — POTASSIUM CHLORIDE 20 MEQ: 1500 TABLET, EXTENDED RELEASE ORAL at 10:35

## 2017-09-11 ASSESSMENT — COGNITIVE AND FUNCTIONAL STATUS - GENERAL
DRESSING REGULAR LOWER BODY CLOTHING: A LITTLE
DAILY ACTIVITIY SCORE: 22
HELP NEEDED FOR BATHING: A LITTLE
SUGGESTED CMS G CODE MODIFIER DAILY ACTIVITY: CJ

## 2017-09-11 ASSESSMENT — ENCOUNTER SYMPTOMS
DEPRESSION: 0
NERVOUS/ANXIOUS: 0
FEVER: 0
SPUTUM PRODUCTION: 0
PALPITATIONS: 0
DIAPHORESIS: 0
DIZZINESS: 0
ORTHOPNEA: 0
FOCAL WEAKNESS: 0
ABDOMINAL PAIN: 0
SHORTNESS OF BREATH: 0
BLURRED VISION: 0
SORE THROAT: 0
TINGLING: 0
DOUBLE VISION: 0
WHEEZING: 0
CONSTIPATION: 0
CARDIOVASCULAR NEGATIVE: 1
EYES NEGATIVE: 1
SENSORY CHANGE: 0
TREMORS: 0
MYALGIAS: 0
NAUSEA: 0
VOMITING: 0
CHILLS: 0
HEADACHES: 0
COUGH: 0
RESPIRATORY NEGATIVE: 1
DIARRHEA: 0
GASTROINTESTINAL NEGATIVE: 1
WEAKNESS: 1

## 2017-09-11 ASSESSMENT — PAIN SCALES - GENERAL
PAINLEVEL_OUTOF10: 7
PAINLEVEL_OUTOF10: 7
PAINLEVEL_OUTOF10: 8
PAINLEVEL_OUTOF10: 7
PAINLEVEL_OUTOF10: 3

## 2017-09-11 NOTE — CARE PLAN
Problem: Safety  Goal: Will remain free from falls  Patient is compliant with call bell.  Patient waits for staff assistance.  Patient has treaded socks on and is near nursing station.

## 2017-09-11 NOTE — NON-PROVIDER
Internal Medicine Medical Student Note    Name April Alicia     1960   Age/Sex 57 y.o. female   MRN [id number]   Code Status Full     After 5PM or if no immediate response to page, please call for cross-coverage  Attending/Team: Jacqueline See Patient List for primary contact information  Call [phone] to page after hours   1st Call - Day Intern (R1):    2nd Call - Day Sr. Resident (R2/R3):            Reason for interval visit  (Principal Problem)   R knee septic arthritis s/p TKR and I&D with poly exchange    Interval Problem Daily Status Update  (24 hours)   No acute overnight events. Patient claims more knee pain than usual this morning. Would appears to be healing well. Medically she is clear to go, just waiting on Medicaid bed to [facility]  spoke with [facility] and a bed should be available tomorrow . Patient is agreeable to be discharged to SNF. Will continue IV antibiotics until 6 weeks from poly exchange.    Review of Systems   Constitutional: Negative for chills, diaphoresis, fever and malaise/fatigue.   HENT: Negative for congestion, hearing loss, sore throat and tinnitus.    Eyes: Negative for blurred vision and double vision.   Respiratory: Negative for cough, sputum production, shortness of breath and wheezing.    Cardiovascular: Positive for leg swelling. Negative for chest pain, palpitations and orthopnea.   Gastrointestinal: Negative for abdominal pain, constipation, diarrhea, nausea and vomiting.   Genitourinary: Negative for dysuria, frequency and urgency.   Musculoskeletal: Positive for joint pain. Negative for myalgias.   Skin: Negative for rash.   Neurological: Negative for dizziness, tingling, tremors, sensory change, focal weakness and headaches.   Psychiatric/Behavioral: Negative for depression. The patient is not nervous/anxious.              Physical Exam       Vitals:    09/10/17 1152 09/10/17 1542 17 0549  09/11/17 0702   BP: 114/73 125/75 (!) 97/55 103/68   Pulse: 75 73 67 65   Resp: 16 16 16    Temp: 37.7 °C (99.9 °F) 37.3 °C (99.1 °F) 36.7 °C (98.1 °F) 36.7 °C (98 °F)   SpO2: 92% 97% 95% 96%   Weight:       Height:         Body mass index is 30.01 kg/m².    Oxygen Therapy:  Pulse Oximetry: 96 %, O2 (LPM): 0, O2 Delivery: None (Room Air)    Physical Exam   Constitutional: She is oriented to person, place, and time and well-developed, well-nourished, and in no distress.   HENT:   Head: Normocephalic and atraumatic.   Mouth/Throat: Oropharynx is clear and moist.   Eyes: Conjunctivae and EOM are normal. Pupils are equal, round, and reactive to light.   Neck: Neck supple. No thyromegaly present.   Cardiovascular: Normal rate, regular rhythm, S1 normal and S2 normal.  Exam reveals no gallop and no friction rub.    Murmur heard.   Systolic murmur is present with a grade of 3/6   Pulmonary/Chest: Effort normal and breath sounds normal. She has no wheezes. She has no rales.   Abdominal: Soft. Bowel sounds are normal. She exhibits no distension. There is no tenderness. There is no rebound and no guarding.   Musculoskeletal:        Right knee: She exhibits swelling.   R knee bandaged without drainage   Lymphadenopathy:     She has no cervical adenopathy.   Neurological: She is alert and oriented to person, place, and time. No cranial nerve deficit. Coordination normal.   Skin: Skin is warm and dry. No rash noted. No erythema.   Psychiatric: Mood and affect normal.           Assessment/Plan   1. R knee septic arthritis s/p TKA and washout  - Synovial fluid intially w/ 20,000 WBCs, 99% polys  - Oxycodone 10-15mg q4h PRN and morphine IV 3mg q3h PRN to control pain  - Wound culture growing rare Stenotrophomonas maltophilia. ID recommends IV bactrim bid and either ancef tid or irtapenem once daily for 6 week duration.  - PICC line placed in LUE   - PT/OT evaluation recommend discharge to SNF and patient is agreeable. [facility] should have bed available tomorrow 9/12.    2. DM2  - Discontinue metformin due to acute illness and possible need for imaging.  - HbA1c 5.5%  - Glucose under good control, POC in 100-120s. Maintain current ISS therapy    3. Normocytic Anemia  - Hgb 8.1, Hct 25.4  - CBC remains stable with slow improvement  - Fe 34, total bili 0.9, direct bili 0.3, indirect bili 0.6, , folate >23.7, B12 >1500, ferritin 133, haptoglobin 76  - ACD possible due to h/o multiple chronic inflammatory disease and infection  - Fe deficiency possible due to acute infections and recurrent surgical interventions leading to chronic blood loss.   - Restart home Fe supplementation   - Transfuse RBCs if Hgb < 7     4. Cirrhosis  - US in 7/2017 showed no change in heterogeneous hepatic architecture and dilated main portal vein consistent with cirrhosis and portal HTN.  - Patient claims she quit drinking alcohol 3-4 years ago and has normal LFTs, Tbili and albumin.

## 2017-09-11 NOTE — DISCHARGE PLANNING
Pt notified that she will d/c to [facility] tomorrow. Transport form completed and faxed to [facility].     Plan: Wait for transport time

## 2017-09-11 NOTE — PROGRESS NOTES
Internal Medicine Interval Note    Name April Alicia     1960   Age/Sex 57 y.o. female   MRN [id number]   Code Status Full     After 5PM or if no immediate response to page, please call for cross-coverage  Attending/Team: Dr. Phillips See Patient List for primary contact information  Call [phone] to page    1st Call - Day Intern (R1):   Dr. Melgar 2nd Call - Day Sr. Resident (R2/R3):   Dr. Garcia         Reason for interval visit  (Principal Problem)   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CMS-HCC)    Interval Problem Daily Status Update  (24 hours)   Pt seen and examined at bedside. Pt denies any fevers/chills. No N/V/D. No abdominal pain. Knee pain is well controlled. She is awaiting medicaid bed in [facility].    ROS  Constitutional: Negative for chills, diaphoresis, fever and malaise/fatigue.   HENT: Negative for congestion and sore throat.    Eyes: Negative for blurred vision, double vision and pain.   Respiratory: Negative for cough, hemoptysis, shortness of breath and wheezing.    Cardiovascular: Negative for chest pain, palpitations, orthopnea, leg swelling and PND.   Gastrointestinal: Negative for abdominal pain, constipation, diarrhea, nausea and vomiting.   Genitourinary: Negative for dysuria.   Musculoskeletal: Negative for myalgias.   Skin: Negative for itching and rash.   Neurological: Negative for dizziness, sensory change, speech change, focal weakness and headaches.      Consultants/Specialty  ID  Ortho    Disposition  Awaiting SNF bed    Quality Measures  Quality-Core Measures  Reviewed items::  Labs reviewed, Medications reviewed and Radiology images reviewed  Amaro catheter::  No Amaro  DVT prophylaxis - mechanical:  SCDs (lovenox stopped d/t low Hb)      Physical Exam       Vitals:    09/10/17 1542 17 0549 17 0702 17 1600   BP: 125/75 (!) 97/55 103/68 105/70   Pulse: 73 67 65 65   Resp: 16 16  16   Temp:  36.7 °C (98.1 °F) 36.7 °C (98 °F) 37.1 °C (98.7  °F)   SpO2: 97% 95% 96% 97%   Weight:       Height:         Body mass index is 30.01 kg/m².    Oxygen Therapy:  Pulse Oximetry: 97 %, O2 (LPM): 0, O2 Delivery: None (Room Air)    Physical Exam  Constitutional: AOx3.    HENT:   Head: Normocephalic and atraumatic.   Mouth/Throat: No oropharyngeal exudate.   Eyes: Normal conjunctiva.  Neck: Supple. No tracheal deviation present.   Cardiovascular: Normal rate and regular rhythm.    Murmur (3/6 systolic decrescendo, S2 present) heard.  Pulmonary/Chest: Effort normal. No respiratory distress. She has no wheezes. She has no rales.   Abdominal: Soft. Bowel sounds are normal. There is no tenderness. There is no rebound and no guarding.   Musculoskeletal:   Right knee wrapped in ACE bandage. Minimal swelling. No drainage.   Neurological: She is alert and oriented to person, place, and time. No cranial nerve deficit.     Lab Data Review:         9/11/2017  4:40 PM    No results for input(s): SODIUM, POTASSIUM, CHLORIDE, CO2, BUN, CREATININE, MAGNESIUM, PHOSPHORUS, CALCIUM in the last 72 hours.    No results for input(s): ALTSGPT, ASTSGOT, ALKPHOSPHAT, TBILIRUBIN, DBILIRUBIN, GAMMAGT, AMYLASE, LIPASE, ALB, PREALBUMIN, GLUCOSE in the last 72 hours.    Recent Labs      09/09/17   0355  09/10/17   0300  09/11/17   0414   HEMOGLOBIN  7.6*  7.8*  8.1*   HEMATOCRIT  23.3*  23.6*  25.4*                 Assessment/Plan     * 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CMS-[facility])- (present on admission)   Assessment & Plan    TKR on 8/21, developed redness/swelling/pain x48hrs.   s/p I&D with poly exchange 9/4  Synovial fluid w/ 20,000 WBCs, 99% polys  Wound Cx grew Stenotrophomonas maltophilia; ID is following. As per ID, unusual organim but only finding. Strep from blood reported as contaminant by lab.  Currently afebrile. No subj. chills or myalgias.  Plan:  - Ertapenem once a day dosing as substitute for ancef on an outpatient basis. Anticipate 6 weeks of therapy from poly exchange.  - While on  antibiotics will need CBC, CMP, ESR and CRP weekly and routine PICC care  - Note: while on Bactrim, may see elevation in Cr. This is an effective of the antibiotic on the testing process and my not reflect a worsening of renal function  -Target date: 10/18        Normocytic anemia- (present on admission)   Assessment & Plan    Baseline Hb 12-13. Has stabilized around 8.   Normocytic, likely anemia of chronic inflammatory disease, ? Iron deficiency.  Normocytic, normal RDW. Corrected retic count normal.   No obvious active bleed, no SOB, CP, abd/back pain, thigh pain or P/E changes. Blood loss from surgery was minimal, no drainage from wound currently.  fobt negative.   Plan:  - f/u daily H&H while inpt, transfuse for <7.0  - Stopped lovenox ppx, SCD in place on the left.  - Restarted patient 's home PO iron 9/7/2017        History of rheumatic fever- (present on admission)   Assessment & Plan    H/o rheumatic fever at age 26.    3/6 systolic murmur on exam, likely MR.   Echo shows no changes from 2008.  BCx 9/3 No growth, final result.    Infective endocarditis is unlikely.        Type 2 diabetes mellitus with neurologic complication (CMS-HCC)- (present on admission)   Assessment & Plan    Holding metformin to avoid metabolic acidosis.  Diabetic diet, ISS   A1c 5.5%        History of hypertension- (present on admission)   Assessment & Plan    As above, holding antihypertensives (losartan, felodipine).         Cirrhosis, alcoholic (CMS-HCC)- (present on admission)   Assessment & Plan    Previous U/S showed findings of cirrhosis w/ portal HTN; pt quit drinking 3 years ago. Normal LFTs, Tbili, Alb, plt. Amanda class A.  Propranolol that was started for variceal ppx was d/c'd in the setting of infection.

## 2017-09-11 NOTE — DISCHARGE PLANNING
[facility] spoke to Tg at [facility] a bed will be available to transfer the patient tomorrow 09/12/2017

## 2017-09-11 NOTE — CARE PLAN
Problem: Pain Management  Goal: Pain level will decrease to patient's comfort goal  Patient was medicated once for pain rated at 8/10.  She was then able to rest comfortably.

## 2017-09-12 VITALS
RESPIRATION RATE: 16 BRPM | TEMPERATURE: 98.2 F | OXYGEN SATURATION: 94 % | DIASTOLIC BLOOD PRESSURE: 61 MMHG | BODY MASS INDEX: 30.07 KG/M2 | HEIGHT: 67 IN | SYSTOLIC BLOOD PRESSURE: 94 MMHG | WEIGHT: 191.58 LBS | HEART RATE: 65 BPM

## 2017-09-12 LAB
GLUCOSE BLD-MCNC: 143 MG/DL (ref 65–99)
GLUCOSE BLD-MCNC: 97 MG/DL (ref 65–99)

## 2017-09-12 PROCEDURE — 82962 GLUCOSE BLOOD TEST: CPT

## 2017-09-12 PROCEDURE — 700111 HCHG RX REV CODE 636 W/ 250 OVERRIDE (IP): Performed by: STUDENT IN AN ORGANIZED HEALTH CARE EDUCATION/TRAINING PROGRAM

## 2017-09-12 PROCEDURE — 700101 HCHG RX REV CODE 250: Performed by: INTERNAL MEDICINE

## 2017-09-12 PROCEDURE — A9270 NON-COVERED ITEM OR SERVICE: HCPCS | Performed by: HOSPITALIST

## 2017-09-12 PROCEDURE — 700102 HCHG RX REV CODE 250 W/ 637 OVERRIDE(OP): Performed by: STUDENT IN AN ORGANIZED HEALTH CARE EDUCATION/TRAINING PROGRAM

## 2017-09-12 PROCEDURE — 700105 HCHG RX REV CODE 258: Performed by: INTERNAL MEDICINE

## 2017-09-12 PROCEDURE — 700102 HCHG RX REV CODE 250 W/ 637 OVERRIDE(OP): Performed by: HOSPITALIST

## 2017-09-12 PROCEDURE — A9270 NON-COVERED ITEM OR SERVICE: HCPCS | Performed by: STUDENT IN AN ORGANIZED HEALTH CARE EDUCATION/TRAINING PROGRAM

## 2017-09-12 PROCEDURE — 99238 HOSP IP/OBS DSCHRG MGMT 30/<: CPT | Mod: GC | Performed by: INTERNAL MEDICINE

## 2017-09-12 PROCEDURE — 700111 HCHG RX REV CODE 636 W/ 250 OVERRIDE (IP): Performed by: INTERNAL MEDICINE

## 2017-09-12 PROCEDURE — 700102 HCHG RX REV CODE 250 W/ 637 OVERRIDE(OP): Performed by: ORTHOPAEDIC SURGERY

## 2017-09-12 PROCEDURE — A9270 NON-COVERED ITEM OR SERVICE: HCPCS | Performed by: ORTHOPAEDIC SURGERY

## 2017-09-12 PROCEDURE — A9270 NON-COVERED ITEM OR SERVICE: HCPCS

## 2017-09-12 PROCEDURE — 700102 HCHG RX REV CODE 250 W/ 637 OVERRIDE(OP)

## 2017-09-12 RX ORDER — SULFAMETHOXAZOLE AND TRIMETHOPRIM 800; 160 MG/1; MG/1
1 TABLET ORAL 2 TIMES DAILY
Start: 2017-09-12 | End: 2020-01-27

## 2017-09-12 RX ADMIN — CETIRIZINE HYDROCHLORIDE 10 MG: 10 TABLET, FILM COATED ORAL at 08:22

## 2017-09-12 RX ADMIN — GABAPENTIN 300 MG: 300 CAPSULE ORAL at 08:28

## 2017-09-12 RX ADMIN — CEFAZOLIN SODIUM 2 G: 2 INJECTION, SOLUTION INTRAVENOUS at 05:16

## 2017-09-12 RX ADMIN — Medication 325 MG: at 08:22

## 2017-09-12 RX ADMIN — POTASSIUM CHLORIDE 20 MEQ: 1500 TABLET, EXTENDED RELEASE ORAL at 09:00

## 2017-09-12 RX ADMIN — OXYCODONE HYDROCHLORIDE 15 MG: 5 TABLET ORAL at 00:43

## 2017-09-12 RX ADMIN — OMEPRAZOLE 20 MG: 20 CAPSULE, DELAYED RELEASE ORAL at 08:22

## 2017-09-12 RX ADMIN — SULFAMETHOXAZOLE AND TRIMETHOPRIM 320 MG: 80; 16 INJECTION, SOLUTION, CONCENTRATE INTRAVENOUS at 11:00

## 2017-09-12 RX ADMIN — OXYCODONE HYDROCHLORIDE 15 MG: 5 TABLET ORAL at 12:40

## 2017-09-12 RX ADMIN — OXYCODONE HYDROCHLORIDE 15 MG: 5 TABLET ORAL at 09:29

## 2017-09-12 RX ADMIN — STANDARDIZED SENNA CONCENTRATE AND DOCUSATE SODIUM 2 TABLET: 8.6; 5 TABLET, FILM COATED ORAL at 08:22

## 2017-09-12 RX ADMIN — SODIUM CHLORIDE 1000 MG: 900 INJECTION INTRAVENOUS at 09:29

## 2017-09-12 RX ADMIN — LOSARTAN POTASSIUM 50 MG: 50 TABLET, FILM COATED ORAL at 08:22

## 2017-09-12 RX ADMIN — OXYCODONE HYDROCHLORIDE 15 MG: 5 TABLET ORAL at 05:17

## 2017-09-12 ASSESSMENT — ENCOUNTER SYMPTOMS
COUGH: 0
VOMITING: 0
NAUSEA: 0
ABDOMINAL PAIN: 0
CONSTIPATION: 0
DIARRHEA: 0
CHILLS: 0
FEVER: 0
SHORTNESS OF BREATH: 0

## 2017-09-12 ASSESSMENT — PAIN SCALES - GENERAL: PAINLEVEL_OUTOF10: 7

## 2017-09-12 NOTE — PROGRESS NOTES
Infectious Disease Progress Note    Author: Jose Guadalupe Villasenor Date & Time created: 9/12/2017  10:17 AM  POD # 8 - L I&D right knee, poly exchange  Cefazolin Day #7/ po Bactrim: Day #5( added 9/6)    Interval History:  Past 24 hrs reviewed with staff.  Labs Reviewed, Medications Reviewed, Radiology Reviewed, Wound Reviewed, Fluids Reviewed and GI Nutrition Reviewed    Review of Systems:  Review of Systems   Constitutional: Negative for chills and fever.   Respiratory: Negative for cough and shortness of breath.    Cardiovascular: Negative for chest pain.   Gastrointestinal: Negative for abdominal pain, constipation, diarrhea, nausea and vomiting.   Genitourinary: Negative for dysuria and frequency.   Musculoskeletal:        Right knee still sore.   Skin: Negative for itching and rash.       Physical Exam:  Physical Exam   Constitutional: She is oriented to person, place, and time. She appears well-developed.   HENT:   Head: Normocephalic and atraumatic.   Cardiovascular: Normal rate and regular rhythm.    Murmur heard.   Systolic murmur is present with a grade of 2/6   LSB and AR not new   Pulmonary/Chest: Effort normal. No respiratory distress. She has no wheezes.   Abdominal: Soft. She exhibits no distension. There is no tenderness. There is no guarding.   Musculoskeletal:   Right knee dressed.   Neurological: She is alert and oriented to person, place, and time.   Skin: Skin is warm and dry.   Psychiatric: She has a normal mood and affect. Her behavior is normal.   Nursing note and vitals reviewed.      Labs:  Recent Results (from the past 24 hour(s))   ACCU-CHEK GLUCOSE    Collection Time: 09/11/17  5:30 PM   Result Value Ref Range    Glucose - Accu-Ck 156 (H) 65 - 99 mg/dL   ACCU-CHEK GLUCOSE    Collection Time: 09/11/17  8:16 PM   Result Value Ref Range    Glucose - Accu-Ck 108 (H) 65 - 99 mg/dL   ACCU-CHEK GLUCOSE    Collection Time: 09/12/17  5:23 AM   Result Value Ref Range    Glucose - Accu-Ck 97 65 - 99  "mg/dL     Results     Procedure Component Value Units Date/Time    BLOOD CULTURE [[id number]] Collected:  09/04/17 1204    Order Status:  Completed Specimen:  Blood from Peripheral Updated:  09/09/17 1500     Significant Indicator NEG     Source BLD     Site PERIPHERAL     Blood Culture No growth after 5 days of incubation.    Narrative:       Per Hospital Policy: Only change Specimen Src: to \"Line\" if  specified by physician order.    BLOOD CULTURE [[id number]] Collected:  09/04/17 1213    Order Status:  Completed Specimen:  Blood from Peripheral Updated:  09/09/17 1500     Significant Indicator NEG     Source BLD     Site PERIPHERAL     Blood Culture No growth after 5 days of incubation.    Narrative:       Per Hospital Policy: Only change Specimen Src: to \"Line\" if  specified by physician order.    BLOOD CULTURE x2 [[id number]] Collected:  09/03/17 2026    Order Status:  Completed Specimen:  Blood from Peripheral Updated:  09/08/17 2100     Significant Indicator NEG     Source BLD     Site PERIPHERAL     Blood Culture No growth after 5 days of incubation.    Narrative:       Per Hospital Policy: Only change Specimen Src: to \"Line\" if  specified by physician order.    CULTURE TISSUE W/ GRM STAIN [[id number]] Collected:  09/03/17 2327    Order Status:  Completed Specimen:  Tissue Updated:  09/08/17 1545     Gram Stain Result --     Few WBCs.  No organisms seen.       Significant Indicator NEG     Source TISS     Site Right Knee     Tissue Culture No growth at 72 hours.    ANAEROBIC CULTURE [[id number]] Collected:  09/03/17 2327    Order Status:  Completed Specimen:  Tissue Updated:  09/08/17 1545     Significant Indicator NEG     Source TISS     Site Right Knee     Anaerobic Culture, Culture Res No Anaerobes isolated.    CULTURE WOUND W/ GRAM STAIN [[id number]]  (Abnormal)  (Susceptibility) Collected:  09/03/17 2324    Order Status:  Completed Specimen:  Wound Updated:  09/08/17 1536     Significant Indicator POS (POS) "     Source WND     Site Right Knee Synovium     Culture Result Wound -- (A)     Growth noted after further incubation,see below for  organism identification.       Gram Stain Result --     Rare WBCs.  No organisms seen.       Culture Result Wound -- (A)     Stenotrophomonas (X.) maltophilia  Rare growth      Narrative:       CALL  [place]  131 tel. [phone],  CALLED  131 tel. [phone] 09/06/2017, 10:05, RB PERF. RESULTS CALLED TO:  [id number] RN    Culture & Susceptibility     STENOTROPHOMONAS (X.) MALTOPHILIA     Antibiotic Sensitivity Microscan Unit Status    Ceftazidime Resistant >16 mcg/mL Final    Levofloxacin Sensitive <=2 mcg/mL Final    Trimeth/Sulfa Sensitive <=2/38 mcg/mL Final                       ANAEROBIC CULTURE [[id number]] Collected:  09/03/17 2324    Order Status:  Completed Specimen:  Wound Updated:  09/08/17 1536     Significant Indicator NEG     Source WND     Site Right Knee Synovium     Anaerobic Culture, Culture Res No Anaerobes isolated.    Narrative:       CALL  Monahan  131 tel. [phone],  CALLED  131 tel. [phone] 09/06/2017, 10:05, RB PERF. RESULTS CALLED TO:  [id number] RN    FLUID CULTURE W/GRAM STAIN [[id number]] Collected:  09/03/17 1856    Order Status:  Completed Specimen:  Synovial from Synovial Fluid Updated:  09/06/17 0934     Gram Stain Result --     No organisms seen.  Few WBCs.       Significant Indicator NEG     Source SYNO     Site Right Knee     Culture Result Bdf No growth at 72 hours.    Narrative:       Right knee stat synovial fluid gram stain and culture  - call  results to Dr Russell [phone]    BLOOD CULTURE x2 [[id number]]  (Abnormal) Collected:  09/03/17 1918    Order Status:  Completed Specimen:  Blood from Peripheral Updated:  09/06/17 0823     Significant Indicator POS (POS)     Source BLD     Site PERIPHERAL     Blood Culture Growth detected by Bactec instrument. (A)     Blood Culture -- (A)     Viridans streptococcus - possible contaminant  Isolated from one bottle only,  "possible skin contaminant  please correlate with clinical condition.      Narrative:       CALL  [place]ahan  131 tel. [phone],  CALLED  131 tel. [phone] 2017, 07:23, RB PERF. RESULTS CALLED  TO:Lory [phone] Rn  Per Hospital Policy: Only change Specimen Src: to \"Line\" if  specified by physician order.        Hemodynamics:  Temp (24hrs), Av °C (98.6 °F), Min:36.8 °C (98.2 °F), Max:37.3 °C (99.1 °F)  Temperature: 36.8 °C (98.2 °F)  Pulse  Av.7  Min: 56  Max: 95   Blood Pressure: (!) 94/61     Central Line Group 1 (A) Left;Basilic Single Lumen;PICC;Power Injection Catheter 4 (Active)   Line Secured Securing Device 2017  1:25 AM   Patency and Function Check Performed at Beginning of Shift 2017  1:25 AM   Line Necessity Assessed Antibiotic Therapy Greater than 7 Days 2017  1:25 AM   Consider Removal of Femoral Line Not Applicable 2017  1:25 AM   Closed Tubing Set Up Yes 2017  1:25 AM   Hand Washing / Gloves Prior to Every Access Yes 2017  1:25 AM   Next Daily Chlorhexidine Bath Due (Regional ONLY) 17  1:25 AM   Site Condition / Description Assessed;Patent 2017  1:25 AM   Signs and Symptoms of Infection None Apparent at this Time 2017  1:25 AM   Dressing Type / Description Antimicrobial Patch (BioPatch) 2017  1:25 AM   Dressing Status Observed 2017  1:25 AM   Next Dressing Change  17 10:00 AM   Date Primary Tubing Changed 09/08/17 9/10/2017  7:30 PM   Date Secondary Tubing Changed 09/10/17 9/10/2017  7:30 PM   NEXT Primary Tubing Change  09/12/17 9/10/2017  7:30 PM   NEXT Secondary Tubing Change  09/11/17 9/10/2017  7:30 PM   Date IV Connector(s) Changed 09/10/17 9/10/2017  7:30 PM   NEXT IV Connector(s) Change Date 09/14/17 9/10/2017  6:14 AM     Wound:  Surgical Incision  Incision Right Knee (Active)   Wound Bed Other (comment) 9/10/2017  7:30 PM   Drainage  None 2017  1:25 AM   Periwound Skin Other (Comments) 9/10/2017  " 7:30 PM   Daily - Wound Closure Not Assessed 9/12/2017  1:25 AM   Dressing Options Compression Wrap Two Layer;Dry Gauze 9/12/2017  1:25 AM   Dressing Status / Change Clean;Dry;Intact 9/12/2017  1:25 AM   Daily - Dressing Change Observed 9/12/2017  1:25 AM        Fluids:  Intake/Output       09/10/17 0700 - 09/11/17 0659 09/11/17 0700 - 09/12/17 0659 09/12/17 0700 - 09/13/17 0659      0700-1859 2831-0767 Total 0700-1859 2679-0426 Total 3809-4511 0009-1288 Total       Intake    P.O.  --  340 340  --  -- --  --  -- --    P.O. -- 340 340 -- -- -- -- -- --    I.V.  --  600 600  --  -- --  --  -- --    IV Piggyback Volume (IV Piggyback) -- 100 100 -- -- -- -- -- --    IV Piggyback Volume (bactrim) -- 500 500 -- -- -- -- -- --    Total Intake -- 940 940 -- -- -- -- -- --       Output    Urine  --  -- --  --  -- --  --  -- --    Number of Times Voided -- 4 x 4 x -- 3 x 3 x -- -- --    Stool  --  -- --  --  -- --  --  -- --    Number of Times Stooled -- -- -- -- 0 x 0 x -- -- --    Total Output -- -- -- -- -- -- -- -- --       Net I/O     -- 940 940 -- -- -- -- -- --           GI/Nutrition:  Orders Placed This Encounter   Procedures   • DIET ORDER     Standing Status:   Standing     Number of Occurrences:   1     Order Specific Question:   Diet:     Answer:   Diabetic [3]     Medications:  Current Facility-Administered Medications   Medication Last Dose   • sulfamethoxazole-trimethoprim (SEPTRA) 320 mg in D5W 500 mL IVPB Stopped at 09/11/17 2310   • ferrous sulfate tablet 325 mg 325 mg at 09/12/17 0822   • normal saline PF 10-20 mL     • potassium chloride SA (Kdur) tablet 20 mEq 20 mEq at 09/12/17 0900   • cetirizine (ZYRTEC) tablet 10 mg 10 mg at 09/12/17 0822   • fluticasone (FLONASE) nasal spray 50 mcg 50 mcg at 09/10/17 0934   • gabapentin (NEURONTIN) capsule 300 mg 300 mg at 09/12/17 0828   • zolpidem (AMBIEN) tablet 10 mg     • omeprazole (PRILOSEC) capsule 20 mg 20 mg at 09/12/17 0822   • acetaminophen (TYLENOL)  tablet 650 mg 650 mg at 09/07/17 1101   • senna-docusate (PERICOLACE or SENOKOT S) 8.6-50 MG per tablet 2 Tab 2 Tab at 09/12/17 0822    And   • polyethylene glycol/lytes (MIRALAX) PACKET 1 Packet 1 Packet at 09/05/17 2033    And   • magnesium hydroxide (MILK OF MAGNESIA) suspension 30 mL 30 mL at 09/06/17 1952    And   • bisacodyl (DULCOLAX) suppository 10 mg 10 mg at 09/07/17 1101   • insulin lispro (HUMALOG) injection 1-6 Units Stopped at 09/09/17 2100   • glucose 4 g chewable tablet 16 g      And   • dextrose 50% (D50W) injection 25 mL     • oxycodone immediate release (ROXICODONE) tablet 10 mg 10 mg at 09/11/17 0705    Or   • oxycodone immediate-release (ROXICODONE) tablet 15 mg 15 mg at 09/12/17 0929   • morphine (pf) 4 mg/ml injection 3 mg 3 mg at 09/09/17 1729   • losartan (COZAAR) tablet 50 mg 50 mg at 09/12/17 0822     Medical Decision Making, by Problem:  Active Hospital Problems    Diagnosis   • *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CMS-HCC) [T84.59XA, Z96.659]   • Infection [B99.9]   • History of rheumatic fever [Z86.79]   • Normocytic anemia [D64.9]   • Cirrhosis, alcoholic (CMS-HCC) [K70.30]   • History of hypertension [Z86.79]   • Type 2 diabetes mellitus with neurologic complication (CMS-HCC) [E11.49]   • Diabetic polyneuropathy associated with type 2 diabetes mellitus (CMS-HCC) [E11.42]       Plan:  I reviewed  D/C treatment plans with patient and answered all of her ID questions. We will sign off of her case when she leaves the hospital but be available if questions arise and we are called. Reviewed with patient, micro and staff ~ 30 min on floor in direct patient care/counseling today.

## 2017-09-12 NOTE — DISCHARGE PLANNING
Cobra signed and completed. Bedside nurse and Charge nurse notified of pt's transfer to [facility].

## 2017-09-12 NOTE — DISCHARGE PLANNING
Transportation has been arranged to transfer the patient to [facility] today at 12:00 via the [facility] task ID# [id number]. SW on floor Ingrid and Tg at [facility] has been notified.

## 2017-09-12 NOTE — PROGRESS NOTES
Pt up with walker , steady, had BM this morning per pt. Drsg changed to rt knee incision with staples c/d/i, report given to Juanita MONTAÑO at [facility]

## 2017-09-12 NOTE — CARE PLAN
Problem: Safety  Goal: Will remain free from injury  Outcome: PROGRESSING AS EXPECTED  No injury

## 2017-09-12 NOTE — DISCHARGE SUMMARY
Internal Medicine Discharge Summary      Admit Date:  9/3/2017       Discharge Date:   9/12/2017    Service:   [facility] Internal Medicine Green Team  Attending Physician(s):  Dr. Phillips       Senior Resident(s):  Dr. Garcia  Robin Resident(s):  Dr. Melgar      Primary Diagnosis:   Infected prosthetic joint    Secondary Diagnoses:                Principal Problem:    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CMS-HCC) POA: Yes  Active Problems:    Infection POA: Unknown    History of rheumatic fever POA: Yes    Normocytic anemia POA: Yes    Cirrhosis, alcoholic (CMS-HCC) POA: Yes    History of hypertension POA: Yes    Type 2 diabetes mellitus with neurologic complication (CMS-HCC) POA: Yes    Diabetic polyneuropathy associated with type 2 diabetes mellitus (CMS-HCC) POA: Yes      Overview: - Continue gabapentin for patient's neuropathic symptoms.  Resolved Problems:    Dysuria POA: Yes      Hospital Summary (Brief Narrative):       A 57-year-old woman with a past medical history of diabetes mellitus, hypertension, and alcohol liver cirrhosis, with portal hypertension, and recent past secondary to call history of right knee replacement by Dr. Lindsey of [facility] on 8/21/2017.       Admitted 9/4/2017 for a three days history of knee pain, drainage, fevers and chills. Patient underwent irrigation, debridement, and poly exchange. Patient was started on Ancef and vancomycin blood cultures grew Viridans streptococcus, synovitic fluid cultures grew Stenotrophomonas maltophilia.         Patient was noticed to have a drop in her hemoglobin following the surgery down to 7.7, from a baseline of 12-13. She didn't develop tachycardia, chest pain or have bloody bowel movements. Her anemia was normocytic, normal RDW and corrected retic count was normal. Her serum iron was low, and her ferritin was 133, indicating mixed iron deficiency anemia and inflammatory anemia patient was restarted on her home oral iron.     Regarding  diabetes mellitus metformin was held, and she was continued on an insulin sliding scale, her most recent glycated hemoglobin is A1c 5.5%.      Patient has a history of rheumatic fever at age 26, with a 3/6 systolic murmur on exam, Echo shows no changes compared to one done 2008.     Patient carries medical history of Alcoholic Cirrhosis, previous U/S showed findings of cirrhosis w/ portal HTN; patient report drinking 3 years ago. Her liver enzymes, INR, albumin, bilirubin were within normal limits.    Patient will be discharged for a skilled nursing facility to continue antibiotic therapy and rehabilitation. She has been discharged on double strength Bactrim BID, and Ertapenem 1 gm, to complete 6 weeks of therapy from the date of poly-exchange which was 9/04/2017.      She has been accepted at [facility].     Consultants:     Infectious disease  Surgery orthopedic    Procedures:        9/4: Incision, irrigation and debridement of infected right total knee   with poly exchange.  9/6: PICC Insertion    Imaging/ Testing:      [id number] VIEW   Final Result      Constipation pattern. No evidence small bowel obstruction.      IR-PICC LINE PLACEMENT > AGE 5   Final Result                  Ultrasound-guided PICC placement performed by qualified nursing staff as    above.          ECHOCARDIOGRAM COMP W/O CONT   Final Result      DX-KNEE 3 VIEWS RIGHT   Final Result      1.  RIGHT total knee arthroplasty with anterior skin staples indicate a recent placement.   2.  Large joint effusion and diffuse soft tissue edema.   3.  No fracture, dislocation or gross bony resorption.          Discharge Medications:         Medication Reconciliation: Completed       Medication List      ASK your doctor about these medications      Instructions   albuterol 108 (90 Base) MCG/ACT Aers inhalation aerosol   Inhale 2 Puffs by mouth every 6 hours as needed for Shortness of Breath.  Dose:  2 Puff     AMBIEN 10 MG Tabs  Generic drug:   zolpidem   Take 10 mg by mouth at bedtime as needed for Sleep.  Dose:  10 mg     cetirizine 10 MG Tabs  Commonly known as:  ZYRTEC   Take 10 mg by mouth every day.  Dose:  10 mg     docusate sodium 100 MG Caps  Commonly known as:  COLACE   Take 100 mg by mouth 2 times a day.  Dose:  100 mg     felodipine 10 MG Tb24  Commonly known as:  PLENDIL   Take 10 mg by mouth every day.  Dose:  10 mg     ferrous sulfate 325 (65 Fe) MG tablet   Take 325 mg by mouth every day.  Dose:  325 mg     fluticasone 50 MCG/ACT nasal spray  Commonly known as:  FLONASE   Spray 1 Spray in nose every day.  Dose:  1 Spray     gabapentin 300 MG Caps  Commonly known as:  NEURONTIN   Take 300 mg by mouth 3 times a day.  Dose:  300 mg     losartan 100 MG Tabs  Commonly known as:  COZAAR   Take 100 mg by mouth every day.  Dose:  100 mg     metformin 850 MG Tabs  Commonly known as:  GLUCOPHAGE   Take 850 mg by mouth 2 times a day, with meals.  Dose:  850 mg     oxycodone-acetaminophen  MG Tabs  Commonly known as:  PERCOCET-10   Take 1-2 Tabs by mouth every 6 hours as needed for Severe Pain.  Dose:  1-2 Tab     OYST-DAILY PO   Take 1 Tab by mouth every day.  Dose:  1 Tab     pantoprazole 40 MG Tbec  Commonly known as:  PROTONIX   Take 40 mg by mouth every day.  Dose:  40 mg     potassium chloride SA 10 MEQ Tbcr  Commonly known as:  K-DUR   Take 10 mEq by mouth 2 times a day.  Dose:  10 mEq            Can use .DISCHARGEMEDSLIST if going to another facility         Disposition:  [facility]     Diet:   Diabetic    Activity:  As tolerated. Will benefit from Physical Therapy 4 times per week    Instructions:        The patient was instructed to return to the ER in the event of worsening symptoms. I have counseled the patient on the importance of compliance and the patient has agreed to proceed with all medical recommendations and follow up plan indicated above.   The patient understands that all medications come with benefits and risks. Risks may  include permanent injury or death and these risks can be minimized with close reassessment and monitoring.          Follow up appointment details :      Follow up with PMD    Pending Studies:        None    Time spent on discharge day patient visit, preparing discharge paperwork and arranging for patient follow up.    Summary of follow up issues:   Patient must be on antibiotics (ertapenem and bactrim) for a total of 6 weeks with a target date of 10/18.     Discharge Time (Minutes) :   60        Condition on Discharge    ______________________________________________________________________    Interval history/exam for day of discharge:    Pt is stable    Vitals:    09/11/17 1600 09/11/17 2108 09/12/17 0533 09/12/17 0800   BP: 105/70 100/66 119/69 (!) 94/61   Pulse: 65 71 67 65   Resp: 16 16 16 16   Temp: 37.1 °C (98.7 °F) 37.3 °C (99.1 °F) 36.9 °C (98.5 °F) 36.8 °C (98.2 °F)   SpO2: 97% 95% 93% 94%   Weight:       Height:         Weight/BMI: Body mass index is 30.01 kg/m².  Pulse Oximetry: 94 %, O2 (LPM): 0, O2 Delivery: None (Room Air)    General: NAD  CVS: RRR. Normal s1s2  PULM: CTAB    Most Recent Labs:    Lab Results   Component Value Date/Time    WBC 3.5 (L) 09/08/2017 12:29 AM    RBC 2.71 (L) 09/08/2017 12:29 AM    HEMOGLOBIN 8.1 (L) 09/11/2017 04:14 AM    HEMATOCRIT 25.4 (L) 09/11/2017 04:14 AM    MCV 89.3 09/08/2017 12:29 AM    MCH 29.2 09/08/2017 12:29 AM    MCHC 32.6 (L) 09/08/2017 12:29 AM    MPV 9.5 09/08/2017 12:29 AM    NEUTSPOLYS 48.90 09/07/2017 03:00 AM    LYMPHOCYTES 31.90 09/07/2017 03:00 AM    MONOCYTES 11.40 09/07/2017 03:00 AM    EOSINOPHILS 6.10 09/07/2017 03:00 AM    BASOPHILS 1.10 09/07/2017 03:00 AM    HYPOCHROMIA 1+ 03/12/2009 06:50 PM    ANISOCYTOSIS 1+ 06/11/2006 05:30 AM      Lab Results   Component Value Date/Time    SODIUM 138 09/08/2017 04:28 AM    POTASSIUM 4.1 09/08/2017 04:28 AM    CHLORIDE 110 09/08/2017 04:28 AM    CO2 23 09/08/2017 04:28 AM    GLUCOSE 109 (H) 09/08/2017 04:28  AM    BUN 10 09/08/2017 04:28 AM    CREATININE 0.73 09/08/2017 04:28 AM    CREATININE 0.9 03/12/2009 06:50 PM      Lab Results   Component Value Date/Time    ALTSGPT 11 09/03/2017 07:18 PM    ASTSGOT 20 09/03/2017 07:18 PM    ALKPHOSPHAT 79 09/03/2017 07:18 PM    TBILIRUBIN 0.9 09/06/2017 07:48 AM    DBILIRUBIN 0.3 09/06/2017 07:48 AM    LIPASE 58 10/27/2015 01:00 PM    ALBUMIN 3.5 09/03/2017 07:18 PM    GLOBULIN 4.0 (H) 09/03/2017 07:18 PM    INR 1.27 (H) 09/04/2017 05:47 AM     Lab Results   Component Value Date/Time    PROTHROMBTM 16.3 (H) 09/04/2017 05:47 AM    INR 1.27 (H) 09/04/2017 05:47 AM

## 2017-09-13 ENCOUNTER — HOSPITAL ENCOUNTER (OUTPATIENT)
Dept: LAB | Facility: MEDICAL CENTER | Age: 57
End: 2017-09-13
Attending: INTERNAL MEDICINE
Payer: COMMERCIAL

## 2017-09-13 LAB
ANION GAP SERPL CALC-SCNC: 5 MMOL/L (ref 0–11.9)
BASOPHILS # BLD AUTO: 1 % (ref 0–1.8)
BASOPHILS # BLD: 0.03 K/UL (ref 0–0.12)
BUN SERPL-MCNC: 10 MG/DL (ref 8–22)
CALCIUM SERPL-MCNC: 9.1 MG/DL (ref 8.5–10.5)
CHLORIDE SERPL-SCNC: 109 MMOL/L (ref 96–112)
CO2 SERPL-SCNC: 23 MMOL/L (ref 20–33)
CREAT SERPL-MCNC: 0.73 MG/DL (ref 0.5–1.4)
EOSINOPHIL # BLD AUTO: 0.2 K/UL (ref 0–0.51)
EOSINOPHIL NFR BLD: 6.8 % (ref 0–6.9)
ERYTHROCYTE [DISTWIDTH] IN BLOOD BY AUTOMATED COUNT: 46 FL (ref 35.9–50)
GFR SERPL CREATININE-BSD FRML MDRD: >60 ML/MIN/1.73 M 2
GLUCOSE SERPL-MCNC: 97 MG/DL (ref 65–99)
HCT VFR BLD AUTO: 26 % (ref 37–47)
HGB BLD-MCNC: 8.3 G/DL (ref 12–16)
IMM GRANULOCYTES # BLD AUTO: 0.02 K/UL (ref 0–0.11)
IMM GRANULOCYTES NFR BLD AUTO: 0.7 % (ref 0–0.9)
LYMPHOCYTES # BLD AUTO: 1.05 K/UL (ref 1–4.8)
LYMPHOCYTES NFR BLD: 35.5 % (ref 22–41)
MCH RBC QN AUTO: 28.5 PG (ref 27–33)
MCHC RBC AUTO-ENTMCNC: 31.9 G/DL (ref 33.6–35)
MCV RBC AUTO: 89.3 FL (ref 81.4–97.8)
MONOCYTES # BLD AUTO: 0.33 K/UL (ref 0–0.85)
MONOCYTES NFR BLD AUTO: 11.1 % (ref 0–13.4)
NEUTROPHILS # BLD AUTO: 1.33 K/UL (ref 2–7.15)
NEUTROPHILS NFR BLD: 44.9 % (ref 44–72)
NRBC # BLD AUTO: 0 K/UL
NRBC BLD AUTO-RTO: 0 /100 WBC
PLATELET # BLD AUTO: 120 K/UL (ref 164–446)
PMV BLD AUTO: 10.2 FL (ref 9–12.9)
POTASSIUM SERPL-SCNC: 4.8 MMOL/L (ref 3.6–5.5)
RBC # BLD AUTO: 2.91 M/UL (ref 4.2–5.4)
SODIUM SERPL-SCNC: 137 MMOL/L (ref 135–145)
WBC # BLD AUTO: 3 K/UL (ref 4.8–10.8)

## 2017-09-14 ENCOUNTER — APPOINTMENT (OUTPATIENT)
Dept: RADIOLOGY | Facility: IMAGING CENTER | Age: 57
End: 2017-09-14
Attending: INTERNAL MEDICINE
Payer: MEDICAID

## 2017-09-14 DIAGNOSIS — R76.11 PPD POSITIVE: ICD-10-CM

## 2017-09-20 ENCOUNTER — OUTPATIENT INFUSION SERVICES (OUTPATIENT)
Dept: ONCOLOGY | Facility: MEDICAL CENTER | Age: 57
End: 2017-09-20
Attending: INTERNAL MEDICINE
Payer: MEDICAID

## 2017-09-20 VITALS
HEART RATE: 71 BPM | WEIGHT: 189.6 LBS | TEMPERATURE: 97.9 F | BODY MASS INDEX: 29.76 KG/M2 | OXYGEN SATURATION: 93 % | SYSTOLIC BLOOD PRESSURE: 117 MMHG | RESPIRATION RATE: 18 BRPM | HEIGHT: 67 IN | DIASTOLIC BLOOD PRESSURE: 59 MMHG

## 2017-09-20 DIAGNOSIS — T84.59XD INFECTED PROSTHETIC KNEE JOINT, SUBSEQUENT ENCOUNTER: ICD-10-CM

## 2017-09-20 DIAGNOSIS — Z96.659 INFECTED PROSTHETIC KNEE JOINT, SUBSEQUENT ENCOUNTER: ICD-10-CM

## 2017-09-20 LAB
ALBUMIN SERPL BCP-MCNC: 3.3 G/DL (ref 3.2–4.9)
ALBUMIN/GLOB SERPL: 0.8 G/DL
ALP SERPL-CCNC: 92 U/L (ref 30–99)
ALT SERPL-CCNC: 8 U/L (ref 2–50)
ANION GAP SERPL CALC-SCNC: 7 MMOL/L (ref 0–11.9)
AST SERPL-CCNC: 19 U/L (ref 12–45)
BASOPHILS # BLD AUTO: 1.2 % (ref 0–1.8)
BASOPHILS # BLD: 0.04 K/UL (ref 0–0.12)
BILIRUB SERPL-MCNC: 0.7 MG/DL (ref 0.1–1.5)
BUN SERPL-MCNC: 11 MG/DL (ref 8–22)
CALCIUM SERPL-MCNC: 9.3 MG/DL (ref 8.5–10.5)
CHLORIDE SERPL-SCNC: 103 MMOL/L (ref 96–112)
CO2 SERPL-SCNC: 22 MMOL/L (ref 20–33)
CREAT SERPL-MCNC: 0.84 MG/DL (ref 0.5–1.4)
EOSINOPHIL # BLD AUTO: 0.17 K/UL (ref 0–0.51)
EOSINOPHIL NFR BLD: 5.2 % (ref 0–6.9)
ERYTHROCYTE [DISTWIDTH] IN BLOOD BY AUTOMATED COUNT: 45.8 FL (ref 35.9–50)
GFR SERPL CREATININE-BSD FRML MDRD: >60 ML/MIN/1.73 M 2
GLOBULIN SER CALC-MCNC: 3.9 G/DL (ref 1.9–3.5)
GLUCOSE SERPL-MCNC: 146 MG/DL (ref 65–99)
HCT VFR BLD AUTO: 30.9 % (ref 37–47)
HGB BLD-MCNC: 9.9 G/DL (ref 12–16)
IMM GRANULOCYTES # BLD AUTO: 0.02 K/UL (ref 0–0.11)
IMM GRANULOCYTES NFR BLD AUTO: 0.6 % (ref 0–0.9)
LYMPHOCYTES # BLD AUTO: 1.01 K/UL (ref 1–4.8)
LYMPHOCYTES NFR BLD: 30.9 % (ref 22–41)
MCH RBC QN AUTO: 28.4 PG (ref 27–33)
MCHC RBC AUTO-ENTMCNC: 32 G/DL (ref 33.6–35)
MCV RBC AUTO: 88.5 FL (ref 81.4–97.8)
MONOCYTES # BLD AUTO: 0.2 K/UL (ref 0–0.85)
MONOCYTES NFR BLD AUTO: 6.1 % (ref 0–13.4)
NEUTROPHILS # BLD AUTO: 1.83 K/UL (ref 2–7.15)
NEUTROPHILS NFR BLD: 56 % (ref 44–72)
NRBC # BLD AUTO: 0 K/UL
NRBC BLD AUTO-RTO: 0 /100 WBC
PLATELET # BLD AUTO: 150 K/UL (ref 164–446)
PMV BLD AUTO: 10 FL (ref 9–12.9)
POTASSIUM SERPL-SCNC: 4.3 MMOL/L (ref 3.6–5.5)
PROT SERPL-MCNC: 7.2 G/DL (ref 6–8.2)
RBC # BLD AUTO: 3.49 M/UL (ref 4.2–5.4)
SODIUM SERPL-SCNC: 132 MMOL/L (ref 135–145)
WBC # BLD AUTO: 3.3 K/UL (ref 4.8–10.8)

## 2017-09-20 PROCEDURE — 700105 HCHG RX REV CODE 258: Performed by: INTERNAL MEDICINE

## 2017-09-20 PROCEDURE — 36592 COLLECT BLOOD FROM PICC: CPT

## 2017-09-20 PROCEDURE — 700111 HCHG RX REV CODE 636 W/ 250 OVERRIDE (IP): Performed by: INTERNAL MEDICINE

## 2017-09-20 PROCEDURE — 96365 THER/PROPH/DIAG IV INF INIT: CPT

## 2017-09-20 PROCEDURE — 85025 COMPLETE CBC W/AUTO DIFF WBC: CPT

## 2017-09-20 PROCEDURE — 80053 COMPREHEN METABOLIC PANEL: CPT

## 2017-09-20 RX ADMIN — SODIUM CHLORIDE 1000 MG: 900 INJECTION INTRAVENOUS at 09:59

## 2017-09-20 ASSESSMENT — PAIN SCALES - GENERAL: PAINLEVEL: 7=MODERATE-SEVERE PAIN

## 2017-09-20 NOTE — PROGRESS NOTES
Pt arrives ambulatory via walker for first antibiotic infusion.  PICC line flushed with saline, blood return noted, and labs collected per MD order.  Medication infused per orders with no complications or adverse reactions.  Pt to return tomorrow for next appt, confirmed with patient.  PICC line flushed per protocol, blood return noted, gauze and arm wrap applied to site.  Patient left ambulatory via walker in no distress.

## 2017-09-21 ENCOUNTER — OUTPATIENT INFUSION SERVICES (OUTPATIENT)
Dept: ONCOLOGY | Facility: MEDICAL CENTER | Age: 57
End: 2017-09-21
Attending: INTERNAL MEDICINE
Payer: MEDICAID

## 2017-09-21 VITALS
SYSTOLIC BLOOD PRESSURE: 107 MMHG | DIASTOLIC BLOOD PRESSURE: 59 MMHG | TEMPERATURE: 98.6 F | HEART RATE: 65 BPM | RESPIRATION RATE: 18 BRPM | OXYGEN SATURATION: 96 %

## 2017-09-21 PROCEDURE — 96365 THER/PROPH/DIAG IV INF INIT: CPT

## 2017-09-21 PROCEDURE — 700111 HCHG RX REV CODE 636 W/ 250 OVERRIDE (IP)

## 2017-09-21 PROCEDURE — 700105 HCHG RX REV CODE 258

## 2017-09-21 RX ADMIN — SODIUM CHLORIDE 1000 MG: 900 INJECTION INTRAVENOUS at 11:04

## 2017-09-21 ASSESSMENT — PAIN SCALES - GENERAL: PAINLEVEL: 7=MODERATE-SEVERE PAIN

## 2017-09-21 NOTE — PROGRESS NOTES
Pt arrives ambulatory with walker to IS accompanied by self.  She is here for daily IV ABX.  Pt denies gastric upset.  IV ABX infused as ordered, pt tolerated well, no evidence of adverse effects noted or expressed.  PICC flushes well, brisk blood return noted.  Pt dc'd to self care in no apparent distress.  Returns daily.

## 2017-09-22 ENCOUNTER — OUTPATIENT INFUSION SERVICES (OUTPATIENT)
Dept: ONCOLOGY | Facility: MEDICAL CENTER | Age: 57
End: 2017-09-22
Attending: INTERNAL MEDICINE
Payer: MEDICAID

## 2017-09-22 ENCOUNTER — APPOINTMENT (OUTPATIENT)
Dept: RADIOLOGY | Facility: MEDICAL CENTER | Age: 57
End: 2017-09-22
Attending: EMERGENCY MEDICINE
Payer: MEDICAID

## 2017-09-22 ENCOUNTER — HOSPITAL ENCOUNTER (EMERGENCY)
Facility: MEDICAL CENTER | Age: 57
End: 2017-09-22
Attending: EMERGENCY MEDICINE
Payer: MEDICAID

## 2017-09-22 VITALS
OXYGEN SATURATION: 92 % | BODY MASS INDEX: 29.41 KG/M2 | DIASTOLIC BLOOD PRESSURE: 59 MMHG | HEART RATE: 50 BPM | HEIGHT: 67 IN | WEIGHT: 187.39 LBS | RESPIRATION RATE: 18 BRPM | TEMPERATURE: 98.6 F | SYSTOLIC BLOOD PRESSURE: 107 MMHG

## 2017-09-22 VITALS
HEART RATE: 64 BPM | DIASTOLIC BLOOD PRESSURE: 64 MMHG | OXYGEN SATURATION: 93 % | TEMPERATURE: 97.8 F | HEIGHT: 67 IN | WEIGHT: 185 LBS | BODY MASS INDEX: 29.03 KG/M2 | RESPIRATION RATE: 11 BRPM | SYSTOLIC BLOOD PRESSURE: 134 MMHG

## 2017-09-22 DIAGNOSIS — R00.1 BRADYCARDIA: ICD-10-CM

## 2017-09-22 LAB
ALBUMIN SERPL BCP-MCNC: 3.4 G/DL (ref 3.2–4.9)
ALBUMIN/GLOB SERPL: 0.9 G/DL
ALP SERPL-CCNC: 81 U/L (ref 30–99)
ALT SERPL-CCNC: 8 U/L (ref 2–50)
ANION GAP SERPL CALC-SCNC: 9 MMOL/L (ref 0–11.9)
AST SERPL-CCNC: 18 U/L (ref 12–45)
BASOPHILS # BLD AUTO: 0.9 % (ref 0–1.8)
BASOPHILS # BLD: 0.03 K/UL (ref 0–0.12)
BILIRUB SERPL-MCNC: 0.6 MG/DL (ref 0.1–1.5)
BNP SERPL-MCNC: 144 PG/ML (ref 0–100)
BUN SERPL-MCNC: 10 MG/DL (ref 8–22)
CALCIUM SERPL-MCNC: 9.1 MG/DL (ref 8.5–10.5)
CHLORIDE SERPL-SCNC: 104 MMOL/L (ref 96–112)
CO2 SERPL-SCNC: 20 MMOL/L (ref 20–33)
CREAT SERPL-MCNC: 0.93 MG/DL (ref 0.5–1.4)
EKG IMPRESSION: NORMAL
EOSINOPHIL # BLD AUTO: 0.13 K/UL (ref 0–0.51)
EOSINOPHIL NFR BLD: 3.7 % (ref 0–6.9)
ERYTHROCYTE [DISTWIDTH] IN BLOOD BY AUTOMATED COUNT: 45.9 FL (ref 35.9–50)
GFR SERPL CREATININE-BSD FRML MDRD: >60 ML/MIN/1.73 M 2
GLOBULIN SER CALC-MCNC: 3.8 G/DL (ref 1.9–3.5)
GLUCOSE SERPL-MCNC: 93 MG/DL (ref 65–99)
HCT VFR BLD AUTO: 30.7 % (ref 37–47)
HGB BLD-MCNC: 9.9 G/DL (ref 12–16)
IMM GRANULOCYTES # BLD AUTO: 0.01 K/UL (ref 0–0.11)
IMM GRANULOCYTES NFR BLD AUTO: 0.3 % (ref 0–0.9)
LYMPHOCYTES # BLD AUTO: 1.29 K/UL (ref 1–4.8)
LYMPHOCYTES NFR BLD: 36.8 % (ref 22–41)
MCH RBC QN AUTO: 28.9 PG (ref 27–33)
MCHC RBC AUTO-ENTMCNC: 32.2 G/DL (ref 33.6–35)
MCV RBC AUTO: 89.8 FL (ref 81.4–97.8)
MONOCYTES # BLD AUTO: 0.25 K/UL (ref 0–0.85)
MONOCYTES NFR BLD AUTO: 7.1 % (ref 0–13.4)
NEUTROPHILS # BLD AUTO: 1.8 K/UL (ref 2–7.15)
NEUTROPHILS NFR BLD: 51.2 % (ref 44–72)
NRBC # BLD AUTO: 0 K/UL
NRBC BLD AUTO-RTO: 0 /100 WBC
PLATELET # BLD AUTO: 137 K/UL (ref 164–446)
PMV BLD AUTO: 10.2 FL (ref 9–12.9)
POTASSIUM SERPL-SCNC: 4.2 MMOL/L (ref 3.6–5.5)
PROT SERPL-MCNC: 7.2 G/DL (ref 6–8.2)
RBC # BLD AUTO: 3.42 M/UL (ref 4.2–5.4)
SODIUM SERPL-SCNC: 133 MMOL/L (ref 135–145)
TROPONIN I SERPL-MCNC: <0.01 NG/ML (ref 0–0.04)
WBC # BLD AUTO: 3.5 K/UL (ref 4.8–10.8)

## 2017-09-22 PROCEDURE — 700111 HCHG RX REV CODE 636 W/ 250 OVERRIDE (IP): Performed by: EMERGENCY MEDICINE

## 2017-09-22 PROCEDURE — 94760 N-INVAS EAR/PLS OXIMETRY 1: CPT

## 2017-09-22 PROCEDURE — 80053 COMPREHEN METABOLIC PANEL: CPT

## 2017-09-22 PROCEDURE — 71010 DX-CHEST-PORTABLE (1 VIEW): CPT

## 2017-09-22 PROCEDURE — 93005 ELECTROCARDIOGRAM TRACING: CPT

## 2017-09-22 PROCEDURE — 85025 COMPLETE CBC W/AUTO DIFF WBC: CPT

## 2017-09-22 PROCEDURE — 700105 HCHG RX REV CODE 258: Performed by: EMERGENCY MEDICINE

## 2017-09-22 PROCEDURE — 99284 EMERGENCY DEPT VISIT MOD MDM: CPT

## 2017-09-22 PROCEDURE — 700102 HCHG RX REV CODE 250 W/ 637 OVERRIDE(OP): Performed by: EMERGENCY MEDICINE

## 2017-09-22 PROCEDURE — 84484 ASSAY OF TROPONIN QUANT: CPT

## 2017-09-22 PROCEDURE — 700105 HCHG RX REV CODE 258: Performed by: INTERNAL MEDICINE

## 2017-09-22 PROCEDURE — 36415 COLL VENOUS BLD VENIPUNCTURE: CPT

## 2017-09-22 PROCEDURE — 93005 ELECTROCARDIOGRAM TRACING: CPT | Performed by: EMERGENCY MEDICINE

## 2017-09-22 PROCEDURE — 700111 HCHG RX REV CODE 636 W/ 250 OVERRIDE (IP): Performed by: INTERNAL MEDICINE

## 2017-09-22 PROCEDURE — A9270 NON-COVERED ITEM OR SERVICE: HCPCS | Performed by: EMERGENCY MEDICINE

## 2017-09-22 PROCEDURE — 96365 THER/PROPH/DIAG IV INF INIT: CPT

## 2017-09-22 PROCEDURE — 96374 THER/PROPH/DIAG INJ IV PUSH: CPT | Mod: XU

## 2017-09-22 PROCEDURE — 83880 ASSAY OF NATRIURETIC PEPTIDE: CPT

## 2017-09-22 RX ORDER — OXYCODONE AND ACETAMINOPHEN 10; 325 MG/1; MG/1
1 TABLET ORAL ONCE
Status: COMPLETED | OUTPATIENT
Start: 2017-09-22 | End: 2017-09-22

## 2017-09-22 RX ORDER — SODIUM CHLORIDE 9 MG/ML
INJECTION, SOLUTION INTRAVENOUS CONTINUOUS
Status: DISCONTINUED | OUTPATIENT
Start: 2017-09-22 | End: 2017-09-22 | Stop reason: HOSPADM

## 2017-09-22 RX ORDER — ONDANSETRON 2 MG/ML
4 INJECTION INTRAMUSCULAR; INTRAVENOUS ONCE
Status: COMPLETED | OUTPATIENT
Start: 2017-09-22 | End: 2017-09-22

## 2017-09-22 RX ORDER — OXYCODONE HYDROCHLORIDE AND ACETAMINOPHEN 5; 325 MG/1; MG/1
1 TABLET ORAL ONCE
Status: DISCONTINUED | OUTPATIENT
Start: 2017-09-22 | End: 2017-09-22

## 2017-09-22 RX ADMIN — SODIUM CHLORIDE: 9 INJECTION, SOLUTION INTRAVENOUS at 14:10

## 2017-09-22 RX ADMIN — OXYCODONE HYDROCHLORIDE AND ACETAMINOPHEN 1 TABLET: 10; 325 TABLET ORAL at 14:10

## 2017-09-22 RX ADMIN — SODIUM CHLORIDE 1000 MG: 900 INJECTION INTRAVENOUS at 11:12

## 2017-09-22 RX ADMIN — ONDANSETRON 4 MG: 2 INJECTION INTRAMUSCULAR; INTRAVENOUS at 14:10

## 2017-09-22 ASSESSMENT — PAIN SCALES - GENERAL
PAINLEVEL: 7=MODERATE-SEVERE PAIN
PAINLEVEL_OUTOF10: 4

## 2017-09-22 NOTE — PROGRESS NOTES
"Pt arrives ambulatory to [facility] for Invanz infusion. Hypotension and bradycardia noted upon arrival. Pt reports that she felt \"dizzy this morning and had to sit back down so I didn't fall\". RN educated pt about importance of hydration with antibiotic infusions, she verbalized understanding and reported that she \"hadn't had much water today\". PICC accessed and blood return noted. Invanz infused per orders without complications or adverse reactions. Pt drank fluids while receiving infusion total of 40 oz. RN re-evaluated BP, improvement noted however HR remained bradycardiac on pulse ox 46-52. HR manually 48, abnormal beats noted. Pt reported that she felt nauseous and vomited 1L of fluid. Pt denies any chest pain at this time, she does report feeling \"very tired\". PICC flushed per protocol, gauze with arm wrap applied. RN escorted pt to the ER via wheelchair and report given to Jennifer KRUGER with triage desk, she assumed care at 1222 of pt and was bringing pt back for STAT EKG.   "

## 2017-09-22 NOTE — ED NOTES
Chief Complaint   Patient presents with   • Nausea   • Head Ache   • Other     pt states, sent by infusion center for low heart rate     Agree with triage RN. States dizziness, nausea and low HR. Currently being treated for knee infection.

## 2017-09-22 NOTE — ED NOTES
56 y/o female to triage by w/c  Chief Complaint   Patient presents with   • Nausea   • Head Ache   • Other     pt states, sent by infusion center for low heart rate     Pt states she had infection in her legs

## 2017-09-22 NOTE — ED PROVIDER NOTES
"ED Provider Note    CHIEF COMPLAINT  Chief Complaint   Patient presents with   • Nausea   • Head Ache   • Other     pt states, sent by infusion center for low heart rate       HPI  April Alicia is a 57 y.o. female with history of migraine headaches, seizure disorder, diabetes mellitus, asthma who presents with complaints of a headache along with nausea and vomiting for the past day. The patient says she's been having a headache for the past several days, along with nausea, had an episode of vomiting this morning. She is currently being treated for a left leg infection and has been going to the infusion center. She was at the infusion center and was sent to the ER because of a low heart rate. The patient says that she could not find her \"tryptan\" headache medication today. The patient had a right total knee arthroplasty with Dr. Faustin orthopedics on August 21, 2017. This was complicated by development of an infection, and the patient was admitted in September for a washout of the knee and antibiotics. The patient was discharged from the hospital on September 12, 2017. She went to the infusion center today to receive her antibiotics, and was found to be bradycardic with a heart rate in the 40s, and sent to the emergency department.    REVIEW OF SYSTEMS  See HPI for further details. All other systems are negative.     PAST MEDICAL HISTORY  Past Medical History:   Diagnosis Date   • Infection 9/4/2017   • Arthritis    • ASTHMA    • Diabetes    • Heart abnormality     leakage in left valve   • Heart valve disease    • Hypertension    • Liver disease    • Seizure disorder (CMS-[facility])        FAMILY HISTORY  No family history on file.    SOCIAL HISTORY  Social History     Social History   • Marital status: Single     Spouse name: N/A   • Number of children: N/A   • Years of education: N/A     Social History Main Topics   • Smoking status: Former Smoker   • Smokeless tobacco: Not on file      Comment: 10/2010   • " Alcohol use No   • Drug use: No   • Sexual activity: Not on file     Other Topics Concern   • Not on file     Social History Narrative   • No narrative on file       SURGICAL HISTORY  Past Surgical History:   Procedure Laterality Date   • IRRIGATION & DEBRIDEMENT ORTHO Right 9/3/2017    Procedure: IRRIGATION & DEBRIDEMENT ORTHO, POLY EXCHANGE;  Surgeon: Jerome Russell M.D.;  Location: SURGERY [facility];  Service: Orthopedics   • COLONOSCOPY WITH CLIPPING  10/28/2015    Procedure: COLONOSCOPY WITH CLIPPING;  Surgeon: Ruben Colon M.D.;  Location: ENDOSCOPY [facility];  Service:    • COLONOSCOPY WITH SCLEROTHERAPY  10/28/2015    Procedure: COLONOSCOPY WITH SCLEROTHERAPY;  Surgeon: Ruben Colon M.D.;  Location: ENDOSCOPY [facility];  Service:    • COLONOSCOPY WITH TATTOOING  10/28/2015    Procedure: COLONOSCOPY WITH TATTOOING;  Surgeon: Ruben Colon M.D.;  Location: ENDOSCOPY [facility];  Service:    • GYN SURGERY  2003    hysteroscoopy   • GYN SURGERY  1982    tubal ligation       CURRENT MEDICATIONS  Home Medications     Reviewed by Yaz Perez R.N. (Registered Nurse) on 09/22/17 at 1312  Med List Status: Partial   Medication Last Dose Status   albuterol (VENTOLIN OR PROVENTIL) 108 (90 BASE) MCG/ACT Aero Soln inhalation aerosol 8/23/2017 Active   cetirizine (ZYRTEC) 10 MG Tab 8/23/2017 Active   docusate sodium (COLACE) 100 MG Cap 8/23/2017 Active   ertapenem (INVANZ) 1 g Recon Soln injection 9/22/2017 Active   felodipine (PLENDIL) 10 MG TABLET SR 24 HR 9/22/2017 Active   ferrous sulfate 325 (65 Fe) MG tablet 9/22/2017 Active   fluticasone (FLONASE) 50 MCG/ACT nasal spray 9/15/2017 Active   gabapentin (NEURONTIN) 300 MG Cap 9/22/2017 Active   losartan (COZAAR) 100 MG Tab 9/22/2017 Active   metformin (GLUCOPHAGE) 850 MG Tab 9/22/2017 Active   ondansetron (ZOFRAN ODT) 8 MG TABLET DISPERSIBLE 9/15/2017 Active   Oxycodone-Acetaminophen (PERCOCET-10)  MG Tab 9/21/2017  "Active   Oyster Shell (OYST-DAILY PO) 9/15/2017 Active   pantoprazole (PROTONIX) 40 MG Tablet Delayed Response 9/22/2017 Active   potassium chloride SA (K-DUR) 10 MEQ Tab CR 9/22/2017 Active   sulfamethoxazole-trimethoprim (BACTRIM DS) 800-160 MG tablet 9/22/2017 Active   sumatriptan (IMITREX) 50 MG Tab 8/23/2017 Active   zolpidem (AMBIEN) 10 MG Tab 9/15/2017 Active                ALLERGIES  Allergies   Allergen Reactions   • Nkda [No Known Drug Allergy]        PHYSICAL EXAM  VITAL SIGNS: /64   Pulse (!) 52   Temp 36.6 °C (97.8 °F) (Temporal)   Resp 18   Ht 1.702 m (5' 7\")   Wt 83.9 kg (185 lb)   LMP 06/02/2008   SpO2 95%   BMI 28.98 kg/m²   Constitutional: Awake, alert, in no acute distress, Non-toxic appearance.   HENT: Atraumatic. Bilateral external ears normal, mucous membranesMildly dry, throat nonerythematous without exudates, nose is normal.  Eyes: PERRL, EOMI, conjunctiva moist, noninjected.  Neck: Nontender, Normal range of motion, No nuchal rigidity, No stridor.   Lymphatic: No lymphadenopathy noted.   Cardiovascular: Regular rate and rhythm, 3/6 systolic ejection murmur, no rubs, gallops.  Thorax & Lungs:  Good breath sounds bilaterally, no wheezes, rales, or retractions.  No chest tenderness.  Abdomen: Bowel sounds normal, Soft, nontender, nondistended, no rebound, guarding, masses.  Back: No CVA or spinal tenderness.  Extremities: Intact distal pulses, No edema, there is an ace wrap and dressing over the right knee, with an ace wrap was removed, though the small dressing over the wound incision was left in place. There is surrounding erythema, induration, increased warmth, or fluctuance. No evidence of cellulitis. There is good range of motion on flexion/extension of the knee.  Skin: Warm, Dry, No rashes.   Musculoskeletal: No joint swelling or tenderness.  Neurologic: Alert & oriented x 3, cranial nerves II through XII intact, sensory and motor function normal. No focal deficits. "   Psychiatric: Affect normal, Judgment normal, Mood normal.     EKG  12-lead EKG shows a sinus bradycardia, rate of 50, normal intervals, normal axis, no acute ST-T wave elevations or ST depressions, no pathologic Q waves, no evidence of an acute injury or ischemic pattern on my reading, in comparison to previous EKG from September 4, 2017, there are no acute changes other than the rate. The patient was in a sinus bradycardia with a rate of 58 previously. The patient is not on any rate controlling medications or beta blockers. Given that she is asymptomatic at this time she will be discharged with referral to cardiology. I have discussed this with the ER  and the patient was scheduled for an appointment next week on Friday. Patient is told to return to the ER if he has any worsening symptoms, dizziness, fever, feelings of passing out, or any other problems. She is to call her PCP on Monday.        RADIOLOGY/PROCEDURES  DX-CHEST-PORTABLE (1 VIEW)   Final Result      No acute cardiopulmonary process is seen.      Sequelae of prior granulomatous exposure.      Atherosclerotic plaque.            COURSE & MEDICAL DECISION MAKING  Pertinent Labs & Imaging studies reviewed. (See chart for details)  The patient presents with the above complaints. EKG shows a sinus bradycardia. Chest x-ray shows no acute cardiopulmonary abnormalities. The patient complained of some lightheadedness and dizziness, and was given IV fluids. She complains of a migraine headache and was given Percocet and Zofran. The patient has no fever, no meningismus signs. CBC showed white count 3500, hemoglobin 9.9, normal differential, chemistry sodium 133, otherwise normal. Troponin less than 0.01, . On reexamination, the patient was doing much better. Her heart rate was in the 60s and 70s and appears to be a sinus rhythm.    FINAL IMPRESSION  1. Bradycardia  2. Headache  3.         Electronically signed by: Martín Piña, 9/22/2017 2:06  PM

## 2017-09-23 ENCOUNTER — OUTPATIENT INFUSION SERVICES (OUTPATIENT)
Dept: ONCOLOGY | Facility: MEDICAL CENTER | Age: 57
End: 2017-09-23
Attending: INTERNAL MEDICINE
Payer: MEDICAID

## 2017-09-23 VITALS
WEIGHT: 184.97 LBS | SYSTOLIC BLOOD PRESSURE: 99 MMHG | OXYGEN SATURATION: 96 % | BODY MASS INDEX: 28.97 KG/M2 | HEART RATE: 64 BPM | RESPIRATION RATE: 18 BRPM | DIASTOLIC BLOOD PRESSURE: 52 MMHG | TEMPERATURE: 98.2 F

## 2017-09-23 PROCEDURE — 96365 THER/PROPH/DIAG IV INF INIT: CPT

## 2017-09-23 PROCEDURE — 700105 HCHG RX REV CODE 258

## 2017-09-23 PROCEDURE — 700111 HCHG RX REV CODE 636 W/ 250 OVERRIDE (IP)

## 2017-09-23 RX ADMIN — SODIUM CHLORIDE 1000 MG: 900 INJECTION INTRAVENOUS at 13:51

## 2017-09-23 ASSESSMENT — PAIN SCALES - GENERAL: PAINLEVEL: NO PAIN

## 2017-09-23 NOTE — ED NOTES
All discharge instructions given to pt.  Pt advised not to drink or drive after taking narcotic medications.  Pt verbalized understanding of all discharge instructions. All questions answered.

## 2017-09-24 ENCOUNTER — OUTPATIENT INFUSION SERVICES (OUTPATIENT)
Dept: ONCOLOGY | Facility: MEDICAL CENTER | Age: 57
End: 2017-09-24
Attending: INTERNAL MEDICINE
Payer: MEDICAID

## 2017-09-24 VITALS
BODY MASS INDEX: 28.97 KG/M2 | SYSTOLIC BLOOD PRESSURE: 113 MMHG | HEART RATE: 58 BPM | OXYGEN SATURATION: 96 % | RESPIRATION RATE: 18 BRPM | WEIGHT: 184.97 LBS | TEMPERATURE: 98 F | DIASTOLIC BLOOD PRESSURE: 65 MMHG

## 2017-09-24 PROCEDURE — 700111 HCHG RX REV CODE 636 W/ 250 OVERRIDE (IP)

## 2017-09-24 PROCEDURE — 700105 HCHG RX REV CODE 258

## 2017-09-24 PROCEDURE — 96365 THER/PROPH/DIAG IV INF INIT: CPT

## 2017-09-24 RX ADMIN — SODIUM CHLORIDE 1000 MG: 900 INJECTION INTRAVENOUS at 13:38

## 2017-09-24 ASSESSMENT — PAIN SCALES - GENERAL: PAINLEVEL: 4=SLIGHT-MODERATE PAIN

## 2017-09-24 NOTE — PROGRESS NOTES
Patient arrived ambulatory with FWW to the [facility] for IV Ertapenem. Reviewed vital signs, labs, and physician order. Patient reports feeling better, denies CP or heart palpitations. Visualized brisk blood return from SL PICC to LUE, medication infused, no adverse reactions noted. PICC line dressing changed with sterile technique, placed clave cover, 4X4 gauze, and mesh sleeve for protection. Patient able to state date and time of next infusion apt. Patient left the [facility] ambulatory with walker in no signs of distress.

## 2017-09-24 NOTE — PROGRESS NOTES
Patient arrived with FWW to [facility] for IV Invanz. Reviewed vital signs, labs, and physician order. Reports upcoming apt with surgeon on 9/27/17 for removal of staples to RLE. Visualized brisk blood return to  PICC in LUE. Administered medication, no adverse reaction noted. Flushed PICC line per protocol, placed clave covers, 4X4 gauze and mesh sleeve for protection. Patient able to state date and time of upcoming apt. [facility] in no signs of distress.

## 2017-09-25 ENCOUNTER — OUTPATIENT INFUSION SERVICES (OUTPATIENT)
Dept: ONCOLOGY | Facility: MEDICAL CENTER | Age: 57
End: 2017-09-25
Attending: INTERNAL MEDICINE
Payer: MEDICAID

## 2017-09-25 VITALS
DIASTOLIC BLOOD PRESSURE: 65 MMHG | WEIGHT: 185.19 LBS | HEIGHT: 67 IN | BODY MASS INDEX: 29.07 KG/M2 | SYSTOLIC BLOOD PRESSURE: 123 MMHG | RESPIRATION RATE: 18 BRPM | TEMPERATURE: 98 F | HEART RATE: 60 BPM | OXYGEN SATURATION: 98 %

## 2017-09-25 PROCEDURE — 96365 THER/PROPH/DIAG IV INF INIT: CPT

## 2017-09-25 PROCEDURE — 700105 HCHG RX REV CODE 258: Performed by: INTERNAL MEDICINE

## 2017-09-25 PROCEDURE — 700111 HCHG RX REV CODE 636 W/ 250 OVERRIDE (IP): Performed by: INTERNAL MEDICINE

## 2017-09-25 RX ADMIN — SODIUM CHLORIDE 1000 MG: 900 INJECTION INTRAVENOUS at 15:24

## 2017-09-25 ASSESSMENT — PAIN SCALES - GENERAL: PAINLEVEL: NO PAIN

## 2017-09-25 NOTE — PROGRESS NOTES
Pt is here for her scheduled IVABX. FWW in use. Pain controlled. Pt also has a L elbow mass - according to the patient growing slowly bigger during two years - she has a MD appointment tomorrow and will discuss the possibility of removing this growth tomorrow. Infusion completed without an incident. Discharged home to self care. Returns daily.

## 2017-09-26 ENCOUNTER — OUTPATIENT INFUSION SERVICES (OUTPATIENT)
Dept: ONCOLOGY | Facility: MEDICAL CENTER | Age: 57
End: 2017-09-26
Attending: INTERNAL MEDICINE
Payer: MEDICAID

## 2017-09-26 VITALS
OXYGEN SATURATION: 98 % | SYSTOLIC BLOOD PRESSURE: 117 MMHG | TEMPERATURE: 99 F | DIASTOLIC BLOOD PRESSURE: 54 MMHG | RESPIRATION RATE: 18 BRPM | HEART RATE: 69 BPM | WEIGHT: 185.19 LBS | BODY MASS INDEX: 29.07 KG/M2

## 2017-09-26 PROCEDURE — 700111 HCHG RX REV CODE 636 W/ 250 OVERRIDE (IP): Performed by: INTERNAL MEDICINE

## 2017-09-26 PROCEDURE — 96365 THER/PROPH/DIAG IV INF INIT: CPT

## 2017-09-26 PROCEDURE — 700105 HCHG RX REV CODE 258: Performed by: INTERNAL MEDICINE

## 2017-09-26 RX ADMIN — SODIUM CHLORIDE 1000 MG: 900 INJECTION INTRAVENOUS at 15:32

## 2017-09-26 ASSESSMENT — PAIN SCALES - GENERAL: PAINLEVEL: 5=MODERATE PAIN

## 2017-09-26 NOTE — PROGRESS NOTES
Pt presents for daily Invanz infusion, pt reports feeling well, she states she has been drinking a lot of water, she denies dizziness or lightheadedness, her VS are stable today.  LUE PICC line in place; brisk blood return noted.  Invanz infused without incident.  PICC line flushed per protocol, clave changed, and new swab cap applied.  Next appointment confirmed.  Pt discharged from IS in NAD under self care.

## 2017-09-27 ENCOUNTER — OUTPATIENT INFUSION SERVICES (OUTPATIENT)
Dept: ONCOLOGY | Facility: MEDICAL CENTER | Age: 57
End: 2017-09-27
Attending: INTERNAL MEDICINE
Payer: MEDICAID

## 2017-09-27 VITALS
OXYGEN SATURATION: 97 % | RESPIRATION RATE: 18 BRPM | SYSTOLIC BLOOD PRESSURE: 100 MMHG | BODY MASS INDEX: 29.07 KG/M2 | WEIGHT: 185.19 LBS | TEMPERATURE: 98.1 F | DIASTOLIC BLOOD PRESSURE: 48 MMHG | HEART RATE: 56 BPM

## 2017-09-27 PROCEDURE — 96365 THER/PROPH/DIAG IV INF INIT: CPT

## 2017-09-27 PROCEDURE — 700105 HCHG RX REV CODE 258: Performed by: INTERNAL MEDICINE

## 2017-09-27 PROCEDURE — 700111 HCHG RX REV CODE 636 W/ 250 OVERRIDE (IP): Performed by: INTERNAL MEDICINE

## 2017-09-27 RX ADMIN — SODIUM CHLORIDE 1000 MG: 900 INJECTION INTRAVENOUS at 10:34

## 2017-09-27 ASSESSMENT — PAIN SCALES - GENERAL: PAINLEVEL: NO PAIN

## 2017-09-27 NOTE — PROGRESS NOTES
April returns for IVABX. Invanz infused as ordered. April tolerated well and without incident. PICC line in good condition and has positive blood return. PICC line flushed with saline per protocol. April DC'd home in good condition. Returns daily.

## 2017-09-28 ENCOUNTER — OUTPATIENT INFUSION SERVICES (OUTPATIENT)
Dept: ONCOLOGY | Facility: MEDICAL CENTER | Age: 57
End: 2017-09-28
Attending: INTERNAL MEDICINE
Payer: MEDICAID

## 2017-09-28 VITALS
WEIGHT: 185.19 LBS | RESPIRATION RATE: 18 BRPM | DIASTOLIC BLOOD PRESSURE: 79 MMHG | TEMPERATURE: 98.2 F | OXYGEN SATURATION: 95 % | SYSTOLIC BLOOD PRESSURE: 118 MMHG | BODY MASS INDEX: 29.07 KG/M2 | HEART RATE: 56 BPM

## 2017-09-28 PROCEDURE — 700105 HCHG RX REV CODE 258: Performed by: INTERNAL MEDICINE

## 2017-09-28 PROCEDURE — 96365 THER/PROPH/DIAG IV INF INIT: CPT

## 2017-09-28 PROCEDURE — 700111 HCHG RX REV CODE 636 W/ 250 OVERRIDE (IP): Performed by: INTERNAL MEDICINE

## 2017-09-28 RX ADMIN — SODIUM CHLORIDE 1000 MG: 900 INJECTION INTRAVENOUS at 13:36

## 2017-09-28 ASSESSMENT — PAIN SCALES - GENERAL: PAINLEVEL: NO PAIN

## 2017-09-29 ENCOUNTER — OUTPATIENT INFUSION SERVICES (OUTPATIENT)
Dept: ONCOLOGY | Facility: MEDICAL CENTER | Age: 57
End: 2017-09-29
Attending: INTERNAL MEDICINE
Payer: MEDICAID

## 2017-09-29 ENCOUNTER — OFFICE VISIT (OUTPATIENT)
Dept: CARDIOLOGY | Facility: MEDICAL CENTER | Age: 57
End: 2017-09-29
Payer: MEDICAID

## 2017-09-29 VITALS
DIASTOLIC BLOOD PRESSURE: 62 MMHG | OXYGEN SATURATION: 94 % | WEIGHT: 181.2 LBS | BODY MASS INDEX: 28.44 KG/M2 | SYSTOLIC BLOOD PRESSURE: 118 MMHG | HEIGHT: 67 IN | HEART RATE: 56 BPM

## 2017-09-29 VITALS
RESPIRATION RATE: 18 BRPM | BODY MASS INDEX: 29.07 KG/M2 | WEIGHT: 185.19 LBS | HEART RATE: 58 BPM | TEMPERATURE: 98 F | OXYGEN SATURATION: 98 % | SYSTOLIC BLOOD PRESSURE: 101 MMHG | DIASTOLIC BLOOD PRESSURE: 53 MMHG

## 2017-09-29 DIAGNOSIS — Z86.79 HISTORY OF HYPERTENSION: ICD-10-CM

## 2017-09-29 DIAGNOSIS — R00.1 BRADYCARDIA: ICD-10-CM

## 2017-09-29 PROCEDURE — 700105 HCHG RX REV CODE 258: Performed by: INTERNAL MEDICINE

## 2017-09-29 PROCEDURE — 99204 OFFICE O/P NEW MOD 45 MIN: CPT | Performed by: INTERNAL MEDICINE

## 2017-09-29 PROCEDURE — 96365 THER/PROPH/DIAG IV INF INIT: CPT

## 2017-09-29 PROCEDURE — 700111 HCHG RX REV CODE 636 W/ 250 OVERRIDE (IP): Performed by: INTERNAL MEDICINE

## 2017-09-29 RX ORDER — OXYCODONE HYDROCHLORIDE 10 MG/1
TABLET ORAL
Refills: 0 | COMMUNITY
Start: 2017-07-26 | End: 2018-06-03

## 2017-09-29 RX ORDER — POTASSIUM CHLORIDE 750 MG/1
10 TABLET, FILM COATED, EXTENDED RELEASE ORAL 2 TIMES DAILY
COMMUNITY
Start: 2017-09-12 | End: 2019-02-14

## 2017-09-29 RX ORDER — OXYCODONE HYDROCHLORIDE 5 MG/1
TABLET ORAL
Refills: 0 | COMMUNITY
Start: 2017-08-23 | End: 2017-09-29

## 2017-09-29 RX ORDER — ERTAPENEM SODIUM 1 G/1
INJECTION, POWDER, LYOPHILIZED, FOR SOLUTION INTRAMUSCULAR; INTRAVENOUS
COMMUNITY
Start: 2017-09-18 | End: 2017-10-17

## 2017-09-29 RX ADMIN — SODIUM CHLORIDE 1000 MG: 900 INJECTION INTRAVENOUS at 11:26

## 2017-09-29 ASSESSMENT — ENCOUNTER SYMPTOMS
SHORTNESS OF BREATH: 1
LOSS OF CONSCIOUSNESS: 0
PND: 0
NAUSEA: 1
VOMITING: 1
DIZZINESS: 1
PALPITATIONS: 0
ABDOMINAL PAIN: 0
FALLS: 0
ORTHOPNEA: 0
BRUISES/BLEEDS EASILY: 1

## 2017-09-29 ASSESSMENT — LIFESTYLE VARIABLES: SUBSTANCE_ABUSE: 0

## 2017-09-29 ASSESSMENT — PAIN SCALES - GENERAL: PAINLEVEL: NO PAIN

## 2017-09-29 NOTE — PROGRESS NOTES
Subjective:   April Alicia is a 57 y.o. Female With history of hypertension, diabetes, alcoholic liver cirrhosis and history of rheumatic feverAt the age of 26 who was referred to cardiology clinic for evaluation of bradycardia.     Patient underwent a knee replacement in August 2017. Subsequently was found to have infection of the prosthetic joint and is now on antibiotics till late October. She was in the infusion clinic last week receiving her antibiotics when she was noted to be bradycardic into the 40s. She also reported having nausea and vomiting along with dizziness. She was brought into the ER where she was given fluids. Patient reported symptomatic improvement and then was discharged.    Today, the patient continues to complain about nausea. She has been feeling fatigued since her surgery. Reports occasional palpitations with the nausea. No associated dizziness. No history of syncope.    Reports chest discomfort one episode about 2 months ago when she was extremely anxious and was running up and down stairs a few times. Has never had the symptoms before or since.    Past Medical History:   Diagnosis Date   • Infection 9/4/2017   • Arthritis    • ASTHMA    • Diabetes    • Heart abnormality     leakage in left valve   • Heart valve disease    • Hypertension    • Liver disease    • Seizure disorder (CMS-HCC)      Past Surgical History:   Procedure Laterality Date   • IRRIGATION & DEBRIDEMENT ORTHO Right 9/3/2017    Procedure: IRRIGATION & DEBRIDEMENT ORTHO, POLY EXCHANGE;  Surgeon: Jerome Russell M.D.;  Location: SURGERY [facility];  Service: Orthopedics   • COLONOSCOPY WITH CLIPPING  10/28/2015    Procedure: COLONOSCOPY WITH CLIPPING;  Surgeon: Ruben Colon M.D.;  Location: ENDOSCOPY [facility];  Service:    • COLONOSCOPY WITH SCLEROTHERAPY  10/28/2015    Procedure: COLONOSCOPY WITH SCLEROTHERAPY;  Surgeon: Ruben Colon M.D.;  Location: ENDOSCOPY [facility];  Service:     • COLONOSCOPY WITH TATTOOING  10/28/2015    Procedure: COLONOSCOPY WITH TATTOOING;  Surgeon: Ruben Colon M.D.;  Location: ENDOSCOPY [facility];  Service:    • GYN SURGERY  2003    hysteroscoopy   • GYN SURGERY  1982    tubal ligation     History reviewed. No pertinent family history.  History   Smoking Status   • Former Smoker   • Quit date: 12/29/2016   Smokeless Tobacco   • Never Used     Comment: 10/2010     Quit smoking about 2 years ago. Relapsed earlier this year, quit in March 2017.  Used to drink heavily, quit 7 years ago.  Used to snort cocaine, quit 9 years ago.    Allergies   Allergen Reactions   • Nkda [No Known Drug Allergy]      Outpatient Encounter Prescriptions as of 9/29/2017   Medication Sig Dispense Refill   • INVANZ 1 g Recon Soln      • oxycodone immediate release (ROXICODONE) 10 MG immediate release tablet TK 1 T PO TID PRN P  0   • potassium chloride ER (KLOR-CON) 10 MEQ tablet      • sumatriptan (IMITREX) 50 MG Tab TK 1 T PO  QD PRF MIGRAINE  3   • ertapenem (INVANZ) 1 g Recon Soln injection 1,000 mg by Intramuscular route every day for 36 days. 10 Each    • sulfamethoxazole-trimethoprim (BACTRIM DS) 800-160 MG tablet Take 1 Tab by mouth 2 times a day.     • docusate sodium (COLACE) 100 MG Cap Take 100 mg by mouth 2 times a day.     • ferrous sulfate 325 (65 Fe) MG tablet Take 325 mg by mouth every day.     • metformin (GLUCOPHAGE) 850 MG Tab Take 850 mg by mouth 2 times a day, with meals.     • pantoprazole (PROTONIX) 40 MG Tablet Delayed Response Take 40 mg by mouth every day.     • felodipine (PLENDIL) 10 MG TABLET SR 24 HR Take 10 mg by mouth every day.     • gabapentin (NEURONTIN) 300 MG Cap Take 300 mg by mouth 3 times a day.     • losartan (COZAAR) 100 MG Tab Take 100 mg by mouth every day.     • cetirizine (ZYRTEC) 10 MG Tab Take 10 mg by mouth every day.     • albuterol (VENTOLIN OR PROVENTIL) 108 (90 BASE) MCG/ACT Aero Soln inhalation aerosol Inhale 2 Puffs by mouth every  "6 hours as needed for Shortness of Breath.     • Oxycodone-Acetaminophen (PERCOCET-10)  MG Tab Take 1-2 Tabs by mouth every 6 hours as needed for Severe Pain.     • zolpidem (AMBIEN) 10 MG Tab Take 10 mg by mouth at bedtime as needed for Sleep.     • [DISCONTINUED] oxycodone immediate-release (ROXICODONE) 5 MG Tab TK 1 T PO  Q FOUR H PRN P  0   • ondansetron (ZOFRAN ODT) 8 MG TABLET DISPERSIBLE DIS 1 T ON THE TONGUE Q 8 H PRF NAUSEA  0   • [DISCONTINUED] potassium chloride SA (K-DUR) 10 MEQ Tab CR Take 10 mEq by mouth 2 times a day.     • [DISCONTINUED] fluticasone (FLONASE) 50 MCG/ACT nasal spray Spray 1 Spray in nose every day.     • [DISCONTINUED] Oyster Shell (OYST-DAILY PO) Take 1 Tab by mouth every day.       Facility-Administered Encounter Medications as of 9/29/2017   Medication Dose Route Frequency Provider Last Rate Last Dose   • ertapenem (INVANZ) 1,000 mg in  mL IVPB  1 g Intravenous Q24HRS Deborah Wheeler M.D.         Review of Systems   Constitutional: Positive for malaise/fatigue.   HENT: Positive for hearing loss.    Respiratory: Positive for shortness of breath.    Cardiovascular: Positive for chest pain. Negative for palpitations, orthopnea, leg swelling and PND.   Gastrointestinal: Positive for nausea and vomiting. Negative for abdominal pain.   Musculoskeletal: Negative for falls.   Skin: Negative.    Neurological: Positive for dizziness. Negative for loss of consciousness.   Endo/Heme/Allergies: Bruises/bleeds easily.   Psychiatric/Behavioral: Negative for substance abuse.   All other systems reviewed and are negative.       Objective:   /62   Pulse (!) 56   Ht 1.7 m (5' 6.93\")   Wt 82.2 kg (181 lb 3.2 oz)   LMP 06/02/2008   SpO2 94%   BMI 28.44 kg/m²     Physical Exam   Constitutional: She is oriented to person, place, and time. No distress.   HENT:   Head: Normocephalic and atraumatic.   Eyes: Conjunctivae are normal.   Neck: Normal range of motion. Neck supple. " No JVD present.   Cardiovascular: Regular rhythm.  Bradycardia present.  Exam reveals no gallop and no friction rub.    Murmur heard.   Systolic murmur is present with a grade of 2/6   Holosystolic   Pulmonary/Chest: Effort normal and breath sounds normal. No respiratory distress. She has no wheezes. She has no rales.   Abdominal: Soft. There is no tenderness.   Musculoskeletal: She exhibits no edema.   Neurological: She is alert and oriented to person, place, and time.   Skin: Skin is warm and dry. She is not diaphoretic.   Psychiatric: She has a normal mood and affect.   Nursing note and vitals reviewed.    Labs from September 2017 were reviewed. Hemoglobin 9.9. Creatinine 0.93.    EKG from September 2017 was reviewed. Shows sinus bradycardia 50 bpm, otherwise normal.    Echocardiogram performed in September 2017 was reviewed  CONCLUSIONS  Left ventricular ejection fraction is visually estimated to be 60%.   Grade II diastolic dysfunction.  Severely dilated left atrium. Elevated left atrial pressures.  Mild mitral regurgitation.  Mild-moderate tricuspid regurgitation. Estimated right ventricular   systolic pressure  is 38 mmHg.  Compared to the images of the prior study done 9/12/2008 there has been   no significant change.     Assessment:     1. History of hypertension     2. Bradycardia  HOLTER MONITOR STUDY       Medical Decision Making:  Today's Assessment / Status / Plan:     Patient continues to be bradycardic on exam. Her symptoms could be associated with this bradycardia. I will refer her for a Holter monitor for further evaluation.    Patient has a history of rheumatic fever. No evidence of mitral stenosis on the echocardiogram. Has mild mitral regurgitation. She will need a repeat echocardiogram in a few years for reevaluation.    Her blood pressure is at goal. Continue current medication regimen.      Return to clinic in 1 month or earlier if needed.    Thank you for allowing me to participate in the  care of this patient. Please do not hesitate to contact me with any questions.    Padma Cooper MD  Cardiologist  [facility]      PLEASE NOTE: This dictation was created using voice recognition software.

## 2017-09-29 NOTE — PROGRESS NOTES
Patient arrived for daily Invanz; denied any changes or concerns.  Went to Cardiologist earlier today, states they want her to wear a heart monitor over the weekend to evaluate her brachycardia better.  Pt currently reports no chest pain/palpatations.  PICC line flushed with brisk blood return; Invanz infusing.  Pt called for nurse to give her emesis bag when infusion was almost completed. Pt abruptly vomited at least 1L of green, thin emesis.  Then reported no further nausea or issues.  Strongly encouraged patient to follow up with her PCP; pt states she has discussed nausea/loss appetite with her doctors but they think she is reacting to her medications.  Encouraged pt again for further follow up, as she states she randomly vomits throughout the day, and has little desire to eat.  Pt agreed.  Invanz infused without further incident.  PICC line flushed, new gauze dressing applied. Confirmed tomorrow's appt time; discharged home in no acute distress.

## 2017-09-29 NOTE — LETTER
[facility]   [address] [place] [address]  [place] [address]  Phone: [phone]  Fax: [phone]              April Alicia  1960    Encounter Date: 9/29/2017    Padma Cooper M.D.          PROGRESS NOTE:  Subjective:   April Alicia is a 57 y.o. Female With history of hypertension, diabetes, alcoholic liver cirrhosis and history of rheumatic feverAt the age of 26 who was referred to cardiology clinic for evaluation of bradycardia.     Patient underwent a knee replacement in August 2017. Subsequently was found to have infection of the prosthetic joint and is now on antibiotics till late October. She was in the infusion clinic last week receiving her antibiotics when she was noted to be bradycardic into the 40s. She also reported having nausea and vomiting along with dizziness. She was brought into the ER where she was given fluids. Patient reported symptomatic improvement and then was discharged.    Today, the patient continues to complain about nausea. She has been feeling fatigued since her surgery. Reports occasional palpitations with the nausea. No associated dizziness. No history of syncope.    Reports chest discomfort one episode about 2 months ago when she was extremely anxious and was running up and down stairs a few times. Has never had the symptoms before or since.    Past Medical History:   Diagnosis Date   • Infection 9/4/2017   • Arthritis    • ASTHMA    • Diabetes    • Heart abnormality     leakage in left valve   • Heart valve disease    • Hypertension    • Liver disease    • Seizure disorder (CMS-HCC)      Past Surgical History:   Procedure Laterality Date   • IRRIGATION & DEBRIDEMENT ORTHO Right 9/3/2017    Procedure: IRRIGATION & DEBRIDEMENT ORTHO, POLY EXCHANGE;  Surgeon: Jerome Russell M.D.;  Location: SURGERY [facility];  Service: Orthopedics   • COLONOSCOPY WITH CLIPPING  10/28/2015    Procedure: COLONOSCOPY WITH  CLIPPING;  Surgeon: Ruben Cloon M.D.;  Location: ENDOSCOPY [facility];  Service:    • COLONOSCOPY WITH SCLEROTHERAPY  10/28/2015    Procedure: COLONOSCOPY WITH SCLEROTHERAPY;  Surgeon: Ruben Colon M.D.;  Location: ENDOSCOPY [facility];  Service:    • COLONOSCOPY WITH TATTOOING  10/28/2015    Procedure: COLONOSCOPY WITH TATTOOING;  Surgeon: Ruben Colon M.D.;  Location: ENDOSCOPY [facility];  Service:    • GYN SURGERY  2003    hysteroscoopy   • GYN SURGERY  1982    tubal ligation     History reviewed. No pertinent family history.  History   Smoking Status   • Former Smoker   • Quit date: 12/29/2016   Smokeless Tobacco   • Never Used     Comment: 10/2010     Quit smoking about 2 years ago. Relapsed earlier this year, quit in March 2017.  Used to drink heavily, quit 7 years ago.  Used to snort cocaine, quit 9 years ago.    Allergies   Allergen Reactions   • Nkda [No Known Drug Allergy]      Outpatient Encounter Prescriptions as of 9/29/2017   Medication Sig Dispense Refill   • INVANZ 1 g Recon Soln      • oxycodone immediate release (ROXICODONE) 10 MG immediate release tablet TK 1 T PO TID PRN P  0   • potassium chloride ER (KLOR-CON) 10 MEQ tablet      • sumatriptan (IMITREX) 50 MG Tab TK 1 T PO  QD PRF MIGRAINE  3   • ertapenem (INVANZ) 1 g Recon Soln injection 1,000 mg by Intramuscular route every day for 36 days. 10 Each    • sulfamethoxazole-trimethoprim (BACTRIM DS) 800-160 MG tablet Take 1 Tab by mouth 2 times a day.     • docusate sodium (COLACE) 100 MG Cap Take 100 mg by mouth 2 times a day.     • ferrous sulfate 325 (65 Fe) MG tablet Take 325 mg by mouth every day.     • metformin (GLUCOPHAGE) 850 MG Tab Take 850 mg by mouth 2 times a day, with meals.     • pantoprazole (PROTONIX) 40 MG Tablet Delayed Response Take 40 mg by mouth every day.     • felodipine (PLENDIL) 10 MG TABLET SR 24 HR Take 10 mg by mouth every day.     • gabapentin (NEURONTIN) 300 MG Cap Take 300 mg by mouth 3  "times a day.     • losartan (COZAAR) 100 MG Tab Take 100 mg by mouth every day.     • cetirizine (ZYRTEC) 10 MG Tab Take 10 mg by mouth every day.     • albuterol (VENTOLIN OR PROVENTIL) 108 (90 BASE) MCG/ACT Aero Soln inhalation aerosol Inhale 2 Puffs by mouth every 6 hours as needed for Shortness of Breath.     • Oxycodone-Acetaminophen (PERCOCET-10)  MG Tab Take 1-2 Tabs by mouth every 6 hours as needed for Severe Pain.     • zolpidem (AMBIEN) 10 MG Tab Take 10 mg by mouth at bedtime as needed for Sleep.     • [DISCONTINUED] oxycodone immediate-release (ROXICODONE) 5 MG Tab TK 1 T PO  Q FOUR H PRN P  0   • ondansetron (ZOFRAN ODT) 8 MG TABLET DISPERSIBLE DIS 1 T ON THE TONGUE Q 8 H PRF NAUSEA  0   • [DISCONTINUED] potassium chloride SA (K-DUR) 10 MEQ Tab CR Take 10 mEq by mouth 2 times a day.     • [DISCONTINUED] fluticasone (FLONASE) 50 MCG/ACT nasal spray Spray 1 Spray in nose every day.     • [DISCONTINUED] Oyster Shell (OYST-DAILY PO) Take 1 Tab by mouth every day.       Facility-Administered Encounter Medications as of 9/29/2017   Medication Dose Route Frequency Provider Last Rate Last Dose   • ertapenem (INVANZ) 1,000 mg in  mL IVPB  1 g Intravenous Q24HRS Deborah Wheeler M.D.         Review of Systems   Constitutional: Positive for malaise/fatigue.   HENT: Positive for hearing loss.    Respiratory: Positive for shortness of breath.    Cardiovascular: Positive for chest pain. Negative for palpitations, orthopnea, leg swelling and PND.   Gastrointestinal: Positive for nausea and vomiting. Negative for abdominal pain.   Musculoskeletal: Negative for falls.   Skin: Negative.    Neurological: Positive for dizziness. Negative for loss of consciousness.   Endo/Heme/Allergies: Bruises/bleeds easily.   Psychiatric/Behavioral: Negative for substance abuse.   All other systems reviewed and are negative.       Objective:   /62   Pulse (!) 56   Ht 1.7 m (5' 6.93\")   Wt 82.2 kg (181 lb 3.2 oz) "   LMP 06/02/2008   SpO2 94%   BMI 28.44 kg/m²      Physical Exam   Constitutional: She is oriented to person, place, and time. No distress.   HENT:   Head: Normocephalic and atraumatic.   Eyes: Conjunctivae are normal.   Neck: Normal range of motion. Neck supple. No JVD present.   Cardiovascular: Regular rhythm.  Bradycardia present.  Exam reveals no gallop and no friction rub.    Murmur heard.   Systolic murmur is present with a grade of 2/6   Holosystolic   Pulmonary/Chest: Effort normal and breath sounds normal. No respiratory distress. She has no wheezes. She has no rales.   Abdominal: Soft. There is no tenderness.   Musculoskeletal: She exhibits no edema.   Neurological: She is alert and oriented to person, place, and time.   Skin: Skin is warm and dry. She is not diaphoretic.   Psychiatric: She has a normal mood and affect.   Nursing note and vitals reviewed.    Labs from September 2017 were reviewed. Hemoglobin 9.9. Creatinine 0.93.    EKG from September 2017 was reviewed. Shows sinus bradycardia 50 bpm, otherwise normal.    Echocardiogram performed in September 2017 was reviewed  CONCLUSIONS  Left ventricular ejection fraction is visually estimated to be 60%.   Grade II diastolic dysfunction.  Severely dilated left atrium. Elevated left atrial pressures.  Mild mitral regurgitation.  Mild-moderate tricuspid regurgitation. Estimated right ventricular   systolic pressure  is 38 mmHg.  Compared to the images of the prior study done 9/12/2008 there has been   no significant change.     Assessment:     1. History of hypertension     2. Bradycardia  HOLTER MONITOR STUDY       Medical Decision Making:  Today's Assessment / Status / Plan:     Patient continues to be bradycardic on exam. Her symptoms could be associated with this bradycardia. I will refer her for a Holter monitor for further evaluation.    Patient has a history of rheumatic fever. No evidence of mitral stenosis on the echocardiogram. Has mild  mitral regurgitation. She will need a repeat echocardiogram in a few years for reevaluation.    Her blood pressure is at goal. Continue current medication regimen.      Return to clinic in 1 month or earlier if needed.    Thank you for allowing me to participate in the care of this patient. Please do not hesitate to contact me with any questions.    Padma Cooper MD  Cardiologist  [facility]      PLEASE NOTE: This dictation was created using voice recognition software.         Altagracia Rehman M.D.  [facility] [address]  VIA In Basket

## 2017-09-30 ENCOUNTER — OUTPATIENT INFUSION SERVICES (OUTPATIENT)
Dept: ONCOLOGY | Facility: MEDICAL CENTER | Age: 57
End: 2017-09-30
Attending: INTERNAL MEDICINE
Payer: MEDICAID

## 2017-09-30 VITALS
OXYGEN SATURATION: 97 % | TEMPERATURE: 98.5 F | HEART RATE: 81 BPM | SYSTOLIC BLOOD PRESSURE: 107 MMHG | DIASTOLIC BLOOD PRESSURE: 71 MMHG | RESPIRATION RATE: 18 BRPM

## 2017-09-30 DIAGNOSIS — T84.59XD INFECTED PROSTHETIC KNEE JOINT, SUBSEQUENT ENCOUNTER: ICD-10-CM

## 2017-09-30 DIAGNOSIS — Z96.659 INFECTED PROSTHETIC KNEE JOINT, SUBSEQUENT ENCOUNTER: ICD-10-CM

## 2017-09-30 PROCEDURE — 700111 HCHG RX REV CODE 636 W/ 250 OVERRIDE (IP): Performed by: INTERNAL MEDICINE

## 2017-09-30 PROCEDURE — 700105 HCHG RX REV CODE 258: Performed by: INTERNAL MEDICINE

## 2017-09-30 PROCEDURE — 302043 TAPE, HYPAFIX: Performed by: INTERNAL MEDICINE

## 2017-09-30 PROCEDURE — 96365 THER/PROPH/DIAG IV INF INIT: CPT

## 2017-09-30 RX ADMIN — SODIUM CHLORIDE 1000 MG: 900 INJECTION INTRAVENOUS at 11:26

## 2017-09-30 ASSESSMENT — PAIN SCALES - GENERAL: PAINLEVEL: NO PAIN

## 2017-09-30 NOTE — PROGRESS NOTES
"Pt is here for her scheduled IVABX. R knee with dressing intact - no discharge. Pt states her knee is more sore today - she states she was \"running after\" her grandchildren yesterday and thinks she was on her feet too much. The importance of appropriate rest discussed. Pt advised to contact her MD if her knee starts hurting more than her baseline or gets swollen. No nausea today. PICC dressing changed per protocol. Infusion completed without an incident. Discharged home to self care. Returns tomorrow.  "

## 2017-10-01 ENCOUNTER — OUTPATIENT INFUSION SERVICES (OUTPATIENT)
Dept: ONCOLOGY | Facility: MEDICAL CENTER | Age: 57
End: 2017-10-01
Attending: INTERNAL MEDICINE
Payer: MEDICAID

## 2017-10-01 VITALS
TEMPERATURE: 98.7 F | SYSTOLIC BLOOD PRESSURE: 111 MMHG | RESPIRATION RATE: 18 BRPM | OXYGEN SATURATION: 93 % | HEART RATE: 58 BPM | DIASTOLIC BLOOD PRESSURE: 69 MMHG

## 2017-10-01 PROCEDURE — 700111 HCHG RX REV CODE 636 W/ 250 OVERRIDE (IP): Performed by: INTERNAL MEDICINE

## 2017-10-01 PROCEDURE — 700105 HCHG RX REV CODE 258: Performed by: INTERNAL MEDICINE

## 2017-10-01 PROCEDURE — 96365 THER/PROPH/DIAG IV INF INIT: CPT

## 2017-10-01 RX ADMIN — SODIUM CHLORIDE 1000 MG: 900 INJECTION INTRAVENOUS at 11:47

## 2017-10-01 ASSESSMENT — PAIN SCALES - GENERAL: PAINLEVEL: NO PAIN

## 2017-10-01 NOTE — PROGRESS NOTES
Pt is here for her scheduled IVABX. Ambulates using her walker. Infusion completed without an incident. Discharged home to self care. Returns tomorrow.

## 2017-10-02 ENCOUNTER — OUTPATIENT INFUSION SERVICES (OUTPATIENT)
Dept: ONCOLOGY | Facility: MEDICAL CENTER | Age: 57
End: 2017-10-02
Attending: INTERNAL MEDICINE
Payer: MEDICAID

## 2017-10-02 VITALS
RESPIRATION RATE: 18 BRPM | DIASTOLIC BLOOD PRESSURE: 54 MMHG | TEMPERATURE: 97.9 F | HEART RATE: 57 BPM | BODY MASS INDEX: 28.75 KG/M2 | SYSTOLIC BLOOD PRESSURE: 104 MMHG | OXYGEN SATURATION: 96 % | WEIGHT: 183.2 LBS | HEIGHT: 67 IN

## 2017-10-02 PROCEDURE — 700111 HCHG RX REV CODE 636 W/ 250 OVERRIDE (IP)

## 2017-10-02 PROCEDURE — 96365 THER/PROPH/DIAG IV INF INIT: CPT

## 2017-10-02 PROCEDURE — 700105 HCHG RX REV CODE 258

## 2017-10-02 RX ADMIN — SODIUM CHLORIDE 1000 MG: 900 INJECTION INTRAVENOUS at 11:16

## 2017-10-02 ASSESSMENT — PAIN SCALES - GENERAL: PAINLEVEL: NO PAIN

## 2017-10-02 NOTE — PROGRESS NOTES
Pt is here for her scheduled IVABX. Pt states having had some nausea after infusions - she has used zofran before and will have it refilled to be used prn.  Ambulates using her cane today. Infusion completed without an incident. Discharged home to self care. Returns tomorrow.

## 2017-10-03 ENCOUNTER — OUTPATIENT INFUSION SERVICES (OUTPATIENT)
Dept: ONCOLOGY | Facility: MEDICAL CENTER | Age: 57
End: 2017-10-03
Attending: INTERNAL MEDICINE
Payer: MEDICAID

## 2017-10-03 VITALS
DIASTOLIC BLOOD PRESSURE: 60 MMHG | RESPIRATION RATE: 18 BRPM | SYSTOLIC BLOOD PRESSURE: 117 MMHG | OXYGEN SATURATION: 95 % | TEMPERATURE: 97.5 F | HEART RATE: 54 BPM

## 2017-10-03 PROCEDURE — 96365 THER/PROPH/DIAG IV INF INIT: CPT

## 2017-10-03 PROCEDURE — 700111 HCHG RX REV CODE 636 W/ 250 OVERRIDE (IP): Performed by: INTERNAL MEDICINE

## 2017-10-03 PROCEDURE — 700105 HCHG RX REV CODE 258: Performed by: INTERNAL MEDICINE

## 2017-10-03 RX ADMIN — SODIUM CHLORIDE 1000 MG: 900 INJECTION INTRAVENOUS at 11:25

## 2017-10-03 ASSESSMENT — PAIN SCALES - GENERAL: PAINLEVEL: NO PAIN

## 2017-10-03 NOTE — PROGRESS NOTES
Patient presents for daily IV antibiotics. Reviewed plan of care, patient verbalizes no new concerns from yesterday. PICC flushes well with brisk blood return. Invanz infused as ordered, line flushed clear. PICC flushed per protocol and site secured. Patient returns tomorrow and released in no acute distress.

## 2017-10-04 ENCOUNTER — OUTPATIENT INFUSION SERVICES (OUTPATIENT)
Dept: ONCOLOGY | Facility: MEDICAL CENTER | Age: 57
End: 2017-10-04
Attending: INTERNAL MEDICINE
Payer: MEDICAID

## 2017-10-04 VITALS
RESPIRATION RATE: 18 BRPM | SYSTOLIC BLOOD PRESSURE: 123 MMHG | DIASTOLIC BLOOD PRESSURE: 69 MMHG | OXYGEN SATURATION: 94 % | TEMPERATURE: 97.9 F | HEART RATE: 63 BPM

## 2017-10-04 PROCEDURE — 700111 HCHG RX REV CODE 636 W/ 250 OVERRIDE (IP): Performed by: INTERNAL MEDICINE

## 2017-10-04 PROCEDURE — 700105 HCHG RX REV CODE 258: Performed by: INTERNAL MEDICINE

## 2017-10-04 PROCEDURE — 96365 THER/PROPH/DIAG IV INF INIT: CPT

## 2017-10-04 RX ADMIN — SODIUM CHLORIDE 1000 MG: 900 INJECTION INTRAVENOUS at 10:14

## 2017-10-04 ASSESSMENT — PAIN SCALES - GENERAL: PAINLEVEL: 8=MODERATE-SEVERE PAIN

## 2017-10-04 NOTE — PROGRESS NOTES
Patient arrived to Infusion for daily Invanz infusion; reports no significant changes or concerns.  PICC line flushed with brisk blood return noted; Invanz infused per MAR without incident.  PICC line flushed, saline capped and new gauze dressing applied. Confirmed tomorrow's appt; discharged home in good spirits under no apparent distress.

## 2017-10-05 ENCOUNTER — OUTPATIENT INFUSION SERVICES (OUTPATIENT)
Dept: ONCOLOGY | Facility: MEDICAL CENTER | Age: 57
End: 2017-10-05
Attending: INTERNAL MEDICINE
Payer: MEDICAID

## 2017-10-05 VITALS
SYSTOLIC BLOOD PRESSURE: 116 MMHG | TEMPERATURE: 98 F | DIASTOLIC BLOOD PRESSURE: 71 MMHG | OXYGEN SATURATION: 97 % | RESPIRATION RATE: 18 BRPM | HEART RATE: 52 BPM

## 2017-10-05 PROCEDURE — 700105 HCHG RX REV CODE 258: Performed by: INTERNAL MEDICINE

## 2017-10-05 PROCEDURE — 96365 THER/PROPH/DIAG IV INF INIT: CPT

## 2017-10-05 PROCEDURE — 700111 HCHG RX REV CODE 636 W/ 250 OVERRIDE (IP): Performed by: INTERNAL MEDICINE

## 2017-10-05 RX ADMIN — SODIUM CHLORIDE 1000 MG: 900 INJECTION INTRAVENOUS at 15:51

## 2017-10-05 ASSESSMENT — PAIN SCALES - GENERAL: PAINLEVEL: 7=MODERATE-SEVERE PAIN

## 2017-10-05 NOTE — PROGRESS NOTES
Pt arrives ambulatory for antibiotic infusion.  PICC line flushed with saline and blood return noted.  Medication infused per orders with no complications or adverse reactions.  Pt to return tomorrow for next appt, confirmed with patient.  At the end of infusion patient stated she felt nauseous, given emesis bag and patient vomited approximately 1 L of brown thin emesis.  Patient stated she felt fine after and that she has been having nausea off and on during infusions and has zofran but didn't take today.  Patient encouraged to follow up with her PCP regarding the nausea.  Patient observed for about 15 minutes after and had no further nausea or adverse effects.  PICC line flushed per protocol, blood return noted, gauze and arm wrap applied to site.  Patient left ambulatory in no distress.

## 2017-10-06 ENCOUNTER — OUTPATIENT INFUSION SERVICES (OUTPATIENT)
Dept: ONCOLOGY | Facility: MEDICAL CENTER | Age: 57
End: 2017-10-06
Attending: INTERNAL MEDICINE
Payer: MEDICAID

## 2017-10-06 VITALS
DIASTOLIC BLOOD PRESSURE: 70 MMHG | OXYGEN SATURATION: 98 % | RESPIRATION RATE: 18 BRPM | TEMPERATURE: 98.3 F | HEART RATE: 62 BPM | SYSTOLIC BLOOD PRESSURE: 117 MMHG

## 2017-10-06 PROCEDURE — 96365 THER/PROPH/DIAG IV INF INIT: CPT

## 2017-10-06 PROCEDURE — 700105 HCHG RX REV CODE 258

## 2017-10-06 PROCEDURE — 700111 HCHG RX REV CODE 636 W/ 250 OVERRIDE (IP)

## 2017-10-06 RX ADMIN — SODIUM CHLORIDE 1000 MG: 900 INJECTION INTRAVENOUS at 16:27

## 2017-10-06 ASSESSMENT — PAIN SCALES - GENERAL: PAINLEVEL: 5=MODERATE PAIN

## 2017-10-07 ENCOUNTER — OUTPATIENT INFUSION SERVICES (OUTPATIENT)
Dept: ONCOLOGY | Facility: MEDICAL CENTER | Age: 57
End: 2017-10-07
Attending: INTERNAL MEDICINE
Payer: MEDICAID

## 2017-10-07 VITALS
SYSTOLIC BLOOD PRESSURE: 111 MMHG | BODY MASS INDEX: 28.75 KG/M2 | HEART RATE: 58 BPM | DIASTOLIC BLOOD PRESSURE: 67 MMHG | RESPIRATION RATE: 18 BRPM | OXYGEN SATURATION: 96 % | WEIGHT: 183.2 LBS | TEMPERATURE: 97.5 F

## 2017-10-07 PROCEDURE — 96365 THER/PROPH/DIAG IV INF INIT: CPT

## 2017-10-07 PROCEDURE — 700111 HCHG RX REV CODE 636 W/ 250 OVERRIDE (IP)

## 2017-10-07 PROCEDURE — 700105 HCHG RX REV CODE 258

## 2017-10-07 RX ADMIN — SODIUM CHLORIDE 1000 MG: 900 INJECTION INTRAVENOUS at 13:57

## 2017-10-07 ASSESSMENT — PAIN SCALES - GENERAL: PAINLEVEL: 5=MODERATE PAIN

## 2017-10-07 NOTE — PROGRESS NOTES
Pt here for daily IV abx.  Pt states no changes from last visit.  PICC patent with brisk blood return.  abx infused without issue.  No nausea or emesis noted today.  Dressing changed today.  Pt left IC, no s/s of distress.  Next apt confirmed.

## 2017-10-07 NOTE — PROGRESS NOTES
Pt arrives ambulatory for antibiotic infusion.  PICC line flushed with saline and blood return noted.  Medication infused per orders with no complications or adverse reactions.  Pt to return tomorrow for next appt, confirmed with patient.  PICC line flushed per protocol, blood return noted, gauze and arm wrap applied to site.  Patient left ambulatory in no distress.    Prior to patient's arrival phone call made to prescribing MD to report patient's previous episodes of emesis and reported nausea.  Unable to reach MD via phone.  Fax sent to [facility] asking for call back.  Still awaiting call back.

## 2017-10-08 ENCOUNTER — OUTPATIENT INFUSION SERVICES (OUTPATIENT)
Dept: ONCOLOGY | Facility: MEDICAL CENTER | Age: 57
End: 2017-10-08
Attending: INTERNAL MEDICINE
Payer: MEDICAID

## 2017-10-08 VITALS
OXYGEN SATURATION: 98 % | HEART RATE: 64 BPM | SYSTOLIC BLOOD PRESSURE: 112 MMHG | WEIGHT: 183.2 LBS | BODY MASS INDEX: 28.75 KG/M2 | TEMPERATURE: 97.8 F | RESPIRATION RATE: 18 BRPM | DIASTOLIC BLOOD PRESSURE: 71 MMHG

## 2017-10-08 PROCEDURE — 700111 HCHG RX REV CODE 636 W/ 250 OVERRIDE (IP)

## 2017-10-08 PROCEDURE — 700105 HCHG RX REV CODE 258

## 2017-10-08 PROCEDURE — 96365 THER/PROPH/DIAG IV INF INIT: CPT

## 2017-10-08 RX ADMIN — SODIUM CHLORIDE 1000 MG: 900 INJECTION INTRAVENOUS at 15:02

## 2017-10-08 ASSESSMENT — PAIN SCALES - GENERAL: PAINLEVEL: 4=SLIGHT-MODERATE PAIN

## 2017-10-08 NOTE — PROGRESS NOTES
"Patient arrived ambulatory to the [facility] for IV Invanz. Reviewed vital signs, and physician order. Patient reports \"remembering that the pain medication I took prior to coming to infusion is causing the nausea during the infusion\". Patient denies taking pain medication prior to this infusion, and has no c/o nausea. Flushed SL PICC to LUE, visualized brisk blood return. Medication administered, no adverse reaction noted. Confirmed upcoming apt date and time with patient. Patient left the [facility] ambulatory in no signs of distress.   "

## 2017-10-09 ENCOUNTER — OUTPATIENT INFUSION SERVICES (OUTPATIENT)
Dept: ONCOLOGY | Facility: MEDICAL CENTER | Age: 57
End: 2017-10-09
Attending: INTERNAL MEDICINE
Payer: MEDICAID

## 2017-10-09 VITALS
TEMPERATURE: 98 F | DIASTOLIC BLOOD PRESSURE: 55 MMHG | HEART RATE: 66 BPM | WEIGHT: 182.98 LBS | BODY MASS INDEX: 28.72 KG/M2 | OXYGEN SATURATION: 98 % | SYSTOLIC BLOOD PRESSURE: 106 MMHG | RESPIRATION RATE: 18 BRPM

## 2017-10-09 PROCEDURE — 96365 THER/PROPH/DIAG IV INF INIT: CPT

## 2017-10-09 PROCEDURE — 700111 HCHG RX REV CODE 636 W/ 250 OVERRIDE (IP)

## 2017-10-09 PROCEDURE — 700105 HCHG RX REV CODE 258

## 2017-10-09 RX ADMIN — SODIUM CHLORIDE 1000 MG: 900 INJECTION INTRAVENOUS at 15:09

## 2017-10-09 ASSESSMENT — PAIN SCALES - GENERAL: PAINLEVEL: 3=SLIGHT PAIN

## 2017-10-09 NOTE — PROGRESS NOTES
Pt returns to infusion center for IV antibiotics as treatment for septic knee infection post replacement.  PICC line in place, brisk blood return noted.  Pt tolerated infusion without incident.  PICC line flushed with saline per policy, orange cap and gauze/net placed.  Pt left infusion center ambulatory and in good condition, returns daily.

## 2017-10-10 ENCOUNTER — OUTPATIENT INFUSION SERVICES (OUTPATIENT)
Dept: ONCOLOGY | Facility: MEDICAL CENTER | Age: 57
End: 2017-10-10
Attending: INTERNAL MEDICINE
Payer: MEDICAID

## 2017-10-10 VITALS
OXYGEN SATURATION: 97 % | RESPIRATION RATE: 18 BRPM | HEIGHT: 67 IN | HEART RATE: 58 BPM | TEMPERATURE: 98.8 F | DIASTOLIC BLOOD PRESSURE: 58 MMHG | WEIGHT: 182.98 LBS | SYSTOLIC BLOOD PRESSURE: 103 MMHG | BODY MASS INDEX: 28.72 KG/M2

## 2017-10-10 PROCEDURE — 96365 THER/PROPH/DIAG IV INF INIT: CPT

## 2017-10-10 PROCEDURE — 700105 HCHG RX REV CODE 258

## 2017-10-10 PROCEDURE — 700111 HCHG RX REV CODE 636 W/ 250 OVERRIDE (IP)

## 2017-10-10 RX ADMIN — SODIUM CHLORIDE 1000 MG: 900 INJECTION INTRAVENOUS at 13:54

## 2017-10-10 ASSESSMENT — PAIN SCALES - GENERAL: PAINLEVEL: 3=SLIGHT PAIN

## 2017-10-10 NOTE — PROGRESS NOTES
Pt here for daily IV abx.  Pt states no changes from yesterday.  PICC patent.  abx infused without issue.  PICC flushed per policy.  Currently trying to arrange follow up with pt primary physician.  Pt left IC, no s/s of distress.

## 2017-10-11 ENCOUNTER — OUTPATIENT INFUSION SERVICES (OUTPATIENT)
Dept: ONCOLOGY | Facility: MEDICAL CENTER | Age: 57
End: 2017-10-11
Attending: INTERNAL MEDICINE
Payer: MEDICAID

## 2017-10-11 VITALS
WEIGHT: 182.98 LBS | TEMPERATURE: 98 F | OXYGEN SATURATION: 98 % | BODY MASS INDEX: 28.72 KG/M2 | HEART RATE: 61 BPM | DIASTOLIC BLOOD PRESSURE: 63 MMHG | SYSTOLIC BLOOD PRESSURE: 108 MMHG | RESPIRATION RATE: 18 BRPM

## 2017-10-11 PROCEDURE — 96365 THER/PROPH/DIAG IV INF INIT: CPT

## 2017-10-11 PROCEDURE — 700105 HCHG RX REV CODE 258

## 2017-10-11 PROCEDURE — 700111 HCHG RX REV CODE 636 W/ 250 OVERRIDE (IP)

## 2017-10-11 RX ADMIN — SODIUM CHLORIDE 1000 MG: 900 INJECTION INTRAVENOUS at 14:52

## 2017-10-11 ASSESSMENT — PAIN SCALES - GENERAL: PAINLEVEL: 8=MODERATE-SEVERE PAIN

## 2017-10-12 ENCOUNTER — OUTPATIENT INFUSION SERVICES (OUTPATIENT)
Dept: ONCOLOGY | Facility: MEDICAL CENTER | Age: 57
End: 2017-10-12
Attending: INTERNAL MEDICINE
Payer: MEDICAID

## 2017-10-12 VITALS
WEIGHT: 182.98 LBS | DIASTOLIC BLOOD PRESSURE: 56 MMHG | SYSTOLIC BLOOD PRESSURE: 103 MMHG | RESPIRATION RATE: 18 BRPM | HEART RATE: 60 BPM | BODY MASS INDEX: 28.72 KG/M2 | TEMPERATURE: 97.6 F | OXYGEN SATURATION: 95 %

## 2017-10-12 PROCEDURE — 700105 HCHG RX REV CODE 258: Performed by: INTERNAL MEDICINE

## 2017-10-12 PROCEDURE — 96366 THER/PROPH/DIAG IV INF ADDON: CPT

## 2017-10-12 PROCEDURE — 700111 HCHG RX REV CODE 636 W/ 250 OVERRIDE (IP): Performed by: INTERNAL MEDICINE

## 2017-10-12 RX ADMIN — SODIUM CHLORIDE 1000 MG: 900 INJECTION INTRAVENOUS at 14:54

## 2017-10-12 ASSESSMENT — PAIN SCALES - GENERAL: PAINLEVEL: NO PAIN

## 2017-10-12 NOTE — PROGRESS NOTES
"Late entry:    Pt arrives for daily IV Invanz therapy. Per Colleen MONTAÑO the pt vomited approximately 500mL of brown fluid upon arrival and pt reported to her that she had coffee and oatmeal prior to arrival. Assessment complete. POC reviewed. Pt reports that she \"feels okay\" now and is unsure if it was something she ate that cause her nausea. Denies alcohol consumption and states she will contact her established PCP if sx's do not improve or worsen. Pt states if PCP is closed she will present to ER. L PICC w/ brisk blood return, abx administered as ordered & w/o adverse s/s reported or observed. PICC flushed per policy, new swab cap applied, and secured w/ gauze & mesh sleeve. D/c'd in great spirits.   "

## 2017-10-13 ENCOUNTER — OUTPATIENT INFUSION SERVICES (OUTPATIENT)
Dept: ONCOLOGY | Facility: MEDICAL CENTER | Age: 57
End: 2017-10-13
Attending: INTERNAL MEDICINE
Payer: MEDICAID

## 2017-10-13 VITALS
OXYGEN SATURATION: 97 % | HEART RATE: 60 BPM | DIASTOLIC BLOOD PRESSURE: 71 MMHG | SYSTOLIC BLOOD PRESSURE: 108 MMHG | RESPIRATION RATE: 18 BRPM | TEMPERATURE: 98.7 F | BODY MASS INDEX: 28.72 KG/M2 | WEIGHT: 182.98 LBS

## 2017-10-13 PROCEDURE — 700105 HCHG RX REV CODE 258: Performed by: INTERNAL MEDICINE

## 2017-10-13 PROCEDURE — 700111 HCHG RX REV CODE 636 W/ 250 OVERRIDE (IP): Performed by: INTERNAL MEDICINE

## 2017-10-13 PROCEDURE — 96365 THER/PROPH/DIAG IV INF INIT: CPT

## 2017-10-13 RX ADMIN — SODIUM CHLORIDE 1000 MG: 900 INJECTION INTRAVENOUS at 15:35

## 2017-10-13 ASSESSMENT — PAIN SCALES - GENERAL: PAINLEVEL: NO PAIN

## 2017-10-13 NOTE — PROGRESS NOTES
Pt here for daily IV Invanz therapy. Reports she is feeling well today and w/o nausea or pain. Assessment complete. POC reviewed. L PICC w/ brisk blood return observed, abx administered as ordered. Tomorrow's appt confirmed w/ pt. Pt d/c'd by Julieth MONTAÑO.

## 2017-10-13 NOTE — PROGRESS NOTES
Pt ambulated into department for her daily Invanz infusion. Pt declined having any new symptoms since yesterday's infusion, denied fatigue or GI distress at this time. PICC had + blood return prior, flushed briskly. Invanz infused as prescribed, Pt tolerated well. PICC had + blood return after, flushed per [facility] policy, clave changed, swab cap applied, line secured to arm with 4x4's and tube gauze. Pt told RN that she has an appointment with Dr. Vazquez on Monday. Tomorrow's appointment at 15:00 confirmed with Pt prior to leaving, left department by self using cane appearing in good spirits and NAD.

## 2017-10-14 ENCOUNTER — OUTPATIENT INFUSION SERVICES (OUTPATIENT)
Dept: ONCOLOGY | Facility: MEDICAL CENTER | Age: 57
End: 2017-10-14
Attending: INTERNAL MEDICINE
Payer: MEDICAID

## 2017-10-14 VITALS
RESPIRATION RATE: 18 BRPM | SYSTOLIC BLOOD PRESSURE: 112 MMHG | HEART RATE: 63 BPM | OXYGEN SATURATION: 98 % | TEMPERATURE: 97.6 F | DIASTOLIC BLOOD PRESSURE: 72 MMHG

## 2017-10-14 PROCEDURE — 700111 HCHG RX REV CODE 636 W/ 250 OVERRIDE (IP): Performed by: INTERNAL MEDICINE

## 2017-10-14 PROCEDURE — 96365 THER/PROPH/DIAG IV INF INIT: CPT

## 2017-10-14 PROCEDURE — 700105 HCHG RX REV CODE 258: Performed by: INTERNAL MEDICINE

## 2017-10-14 RX ADMIN — SODIUM CHLORIDE 1000 MG: 900 INJECTION INTRAVENOUS at 15:12

## 2017-10-14 ASSESSMENT — PAIN SCALES - GENERAL: PAINLEVEL: 4=SLIGHT-MODERATE PAIN

## 2017-10-14 NOTE — PROGRESS NOTES
Pt arrived to IS ambulatory, here for daily IV abx for infected R knee. Left PICC in place, patent with brisk blood return observed. Invanz infused as prescribed. Dressing to PICC changed in sterile field. Pt jim well. Line flushed clear. PICC flushed and new alcohol cap placed. Pt knows to cont to return for daily treatment. Discharged home under self care in no apparent distress.

## 2017-10-15 ENCOUNTER — OUTPATIENT INFUSION SERVICES (OUTPATIENT)
Dept: ONCOLOGY | Facility: MEDICAL CENTER | Age: 57
End: 2017-10-15
Attending: INTERNAL MEDICINE
Payer: MEDICAID

## 2017-10-15 VITALS
DIASTOLIC BLOOD PRESSURE: 78 MMHG | OXYGEN SATURATION: 97 % | TEMPERATURE: 98.5 F | RESPIRATION RATE: 18 BRPM | HEART RATE: 76 BPM | SYSTOLIC BLOOD PRESSURE: 134 MMHG

## 2017-10-15 PROCEDURE — 96365 THER/PROPH/DIAG IV INF INIT: CPT

## 2017-10-15 PROCEDURE — 700111 HCHG RX REV CODE 636 W/ 250 OVERRIDE (IP): Performed by: INTERNAL MEDICINE

## 2017-10-15 PROCEDURE — 700105 HCHG RX REV CODE 258: Performed by: INTERNAL MEDICINE

## 2017-10-15 RX ADMIN — SODIUM CHLORIDE 1000 MG: 900 INJECTION INTRAVENOUS at 15:07

## 2017-10-16 ENCOUNTER — OFFICE VISIT (OUTPATIENT)
Dept: INFECTIOUS DISEASES | Facility: MEDICAL CENTER | Age: 57
End: 2017-10-16
Payer: MEDICAID

## 2017-10-16 ENCOUNTER — OUTPATIENT INFUSION SERVICES (OUTPATIENT)
Dept: ONCOLOGY | Facility: MEDICAL CENTER | Age: 57
End: 2017-10-16
Attending: INTERNAL MEDICINE
Payer: MEDICAID

## 2017-10-16 VITALS
DIASTOLIC BLOOD PRESSURE: 60 MMHG | SYSTOLIC BLOOD PRESSURE: 90 MMHG | TEMPERATURE: 98.4 F | WEIGHT: 188.4 LBS | HEIGHT: 67 IN | BODY MASS INDEX: 29.57 KG/M2 | OXYGEN SATURATION: 96 % | HEART RATE: 55 BPM

## 2017-10-16 VITALS
WEIGHT: 190.26 LBS | DIASTOLIC BLOOD PRESSURE: 65 MMHG | OXYGEN SATURATION: 100 % | HEART RATE: 64 BPM | BODY MASS INDEX: 29.86 KG/M2 | TEMPERATURE: 97.8 F | RESPIRATION RATE: 18 BRPM | SYSTOLIC BLOOD PRESSURE: 111 MMHG | HEIGHT: 67 IN

## 2017-10-16 DIAGNOSIS — T84.59XD INFECTION OF PROSTHETIC KNEE JOINT, SUBSEQUENT ENCOUNTER: ICD-10-CM

## 2017-10-16 DIAGNOSIS — E11.40 TYPE 2 DIABETES MELLITUS WITH DIABETIC NEUROPATHY, UNSPECIFIED LONG TERM INSULIN USE STATUS: Chronic | ICD-10-CM

## 2017-10-16 DIAGNOSIS — Z96.659 INFECTION OF PROSTHETIC KNEE JOINT, SUBSEQUENT ENCOUNTER: ICD-10-CM

## 2017-10-16 DIAGNOSIS — B18.2 CHRONIC HEPATITIS C WITHOUT HEPATIC COMA (HCC): Chronic | ICD-10-CM

## 2017-10-16 DIAGNOSIS — T84.59XA INFECTION OF PROSTHETIC KNEE JOINT, INITIAL ENCOUNTER (HCC): ICD-10-CM

## 2017-10-16 DIAGNOSIS — K70.30 ALCOHOLIC CIRRHOSIS OF LIVER WITHOUT ASCITES (HCC): Chronic | ICD-10-CM

## 2017-10-16 DIAGNOSIS — Z96.659 INFECTION OF PROSTHETIC KNEE JOINT, INITIAL ENCOUNTER (HCC): ICD-10-CM

## 2017-10-16 LAB
ALBUMIN SERPL BCP-MCNC: 3.5 G/DL (ref 3.2–4.9)
ALP SERPL-CCNC: 138 U/L (ref 30–99)
ALT SERPL-CCNC: 26 U/L (ref 2–50)
AST SERPL-CCNC: 35 U/L (ref 12–45)
BASOPHILS # BLD AUTO: 1.5 % (ref 0–1.8)
BASOPHILS # BLD: 0.04 K/UL (ref 0–0.12)
BILIRUB CONJ SERPL-MCNC: 0.1 MG/DL (ref 0.1–0.5)
BILIRUB INDIRECT SERPL-MCNC: 0.3 MG/DL (ref 0–1)
BILIRUB SERPL-MCNC: 0.4 MG/DL (ref 0.1–1.5)
BUN SERPL-MCNC: 13 MG/DL (ref 8–22)
CALCIUM SERPL-MCNC: 8.8 MG/DL (ref 8.5–10.5)
CHLORIDE SERPL-SCNC: 107 MMOL/L (ref 96–112)
CO2 SERPL-SCNC: 21 MMOL/L (ref 20–33)
CREAT SERPL-MCNC: 0.71 MG/DL (ref 0.5–1.4)
EOSINOPHIL # BLD AUTO: 0.12 K/UL (ref 0–0.51)
EOSINOPHIL NFR BLD: 4.4 % (ref 0–6.9)
ERYTHROCYTE [DISTWIDTH] IN BLOOD BY AUTOMATED COUNT: 44.4 FL (ref 35.9–50)
GFR SERPL CREATININE-BSD FRML MDRD: >60 ML/MIN/1.73 M 2
GLUCOSE SERPL-MCNC: 108 MG/DL (ref 65–99)
HCT VFR BLD AUTO: 31 % (ref 37–47)
HGB BLD-MCNC: 10.2 G/DL (ref 12–16)
IMM GRANULOCYTES # BLD AUTO: 0.01 K/UL (ref 0–0.11)
IMM GRANULOCYTES NFR BLD AUTO: 0.4 % (ref 0–0.9)
LYMPHOCYTES # BLD AUTO: 0.9 K/UL (ref 1–4.8)
LYMPHOCYTES NFR BLD: 33.2 % (ref 22–41)
MCH RBC QN AUTO: 28.1 PG (ref 27–33)
MCHC RBC AUTO-ENTMCNC: 32.9 G/DL (ref 33.6–35)
MCV RBC AUTO: 85.4 FL (ref 81.4–97.8)
MONOCYTES # BLD AUTO: 0.21 K/UL (ref 0–0.85)
MONOCYTES NFR BLD AUTO: 7.7 % (ref 0–13.4)
NEUTROPHILS # BLD AUTO: 1.43 K/UL (ref 2–7.15)
NEUTROPHILS NFR BLD: 52.8 % (ref 44–72)
NRBC # BLD AUTO: 0 K/UL
NRBC BLD AUTO-RTO: 0 /100 WBC
PHOSPHATE SERPL-MCNC: 2.8 MG/DL (ref 2.5–4.5)
PLATELET # BLD AUTO: 101 K/UL (ref 164–446)
PMV BLD AUTO: 10.3 FL (ref 9–12.9)
POTASSIUM SERPL-SCNC: 4.7 MMOL/L (ref 3.6–5.5)
PROT SERPL-MCNC: 6.9 G/DL (ref 6–8.2)
RBC # BLD AUTO: 3.63 M/UL (ref 4.2–5.4)
SODIUM SERPL-SCNC: 133 MMOL/L (ref 135–145)
WBC # BLD AUTO: 2.7 K/UL (ref 4.8–10.8)

## 2017-10-16 PROCEDURE — 85025 COMPLETE CBC W/AUTO DIFF WBC: CPT

## 2017-10-16 PROCEDURE — 700105 HCHG RX REV CODE 258

## 2017-10-16 PROCEDURE — 80076 HEPATIC FUNCTION PANEL: CPT | Mod: 91

## 2017-10-16 PROCEDURE — 80069 RENAL FUNCTION PANEL: CPT

## 2017-10-16 PROCEDURE — 96365 THER/PROPH/DIAG IV INF INIT: CPT

## 2017-10-16 PROCEDURE — 700111 HCHG RX REV CODE 636 W/ 250 OVERRIDE (IP)

## 2017-10-16 PROCEDURE — 36592 COLLECT BLOOD FROM PICC: CPT

## 2017-10-16 PROCEDURE — 99214 OFFICE O/P EST MOD 30 MIN: CPT | Performed by: INTERNAL MEDICINE

## 2017-10-16 RX ADMIN — SODIUM CHLORIDE 1000 MG: 900 INJECTION INTRAVENOUS at 14:04

## 2017-10-16 ASSESSMENT — PAIN SCALES - GENERAL: PAINLEVEL: 4=SLIGHT-MODERATE PAIN

## 2017-10-16 NOTE — PROGRESS NOTES
"[facility]. Reviewed vital signs, labs, and physician order. Patient reports seeing ID physician today, \"has plan to see surgeon on Tuesday for possible aspiration of right knee r/t increased pain\". Visualized brisk blood return from SL PICC to LUE, labs obtained per MD order, flushed with no resistance. Medication administered, no adverse reaction observed.  PICC line flushed per protocol, clave cover place, 4X4 gauze and mesh sleeve for protection. Reviewed time for upcoming apt on 10/17/17. Patient left the [facility] ambulatory with cane in no signs of distress.  Lab results faxed to physician for review.   "

## 2017-10-16 NOTE — PROGRESS NOTES
"Chief Complaint   Patient presents with   • New Patient     Infected Prosthetic Knee joint         HISTORY OF THE PRESENT ILLNESS: Patient is a 57 y.o. female. This pleasant patient is here today for infected right knee arthroplasty  S/p I and D with poly exchange 9/4/2017  Seen by Oksana GOODWIN in the hospital last month but will not see her in f/u due to insurance-states she needs orders to continue her Infusion  Op cxs with steno; one blood cxs with Strep viridans  At [facility]  Had been getting ertapenem infusion-due to stop 10/24. C/o nausea with infusions  Tolerating oral Bactrim-denies SE  Primary c/o increased pain and swelling in knee last 2 weeks-sometimes \"hot\"  Feels like it gives out-aapt with Ortho not until 11/26. States her PCP won't aspirate it        Allergies: Nkda [no known drug allergy]    Current Outpatient Prescriptions Ordered in [facility]   Medication Sig Dispense Refill   • oxycodone immediate release (ROXICODONE) 10 MG immediate release tablet TK 1 T PO TID PRN P  0   • potassium chloride ER (KLOR-CON) 10 MEQ tablet      • sumatriptan (IMITREX) 50 MG Tab TK 1 T PO  QD PRF MIGRAINE  3   • ertapenem (INVANZ) 1 g Recon Soln injection 1,000 mg by Intramuscular route every day for 36 days. 10 Each    • sulfamethoxazole-trimethoprim (BACTRIM DS) 800-160 MG tablet Take 1 Tab by mouth 2 times a day.     • docusate sodium (COLACE) 100 MG Cap Take 100 mg by mouth 2 times a day.     • ferrous sulfate 325 (65 Fe) MG tablet Take 325 mg by mouth every day.     • metformin (GLUCOPHAGE) 850 MG Tab Take 850 mg by mouth 2 times a day, with meals.     • pantoprazole (PROTONIX) 40 MG Tablet Delayed Response Take 40 mg by mouth every day.     • felodipine (PLENDIL) 10 MG TABLET SR 24 HR Take 10 mg by mouth every day.     • gabapentin (NEURONTIN) 300 MG Cap Take 300 mg by mouth 3 times a day.     • losartan (COZAAR) 100 MG Tab Take 100 mg by mouth every day.     • cetirizine (ZYRTEC) 10 MG Tab Take " 10 mg by mouth every day.     • INVANZ 1 g Recon Soln      • ondansetron (ZOFRAN ODT) 8 MG TABLET DISPERSIBLE DIS 1 T ON THE TONGUE Q 8 H PRF NAUSEA  0   • albuterol (VENTOLIN OR PROVENTIL) 108 (90 BASE) MCG/ACT Aero Soln inhalation aerosol Inhale 2 Puffs by mouth every 6 hours as needed for Shortness of Breath.     • Oxycodone-Acetaminophen (PERCOCET-10)  MG Tab Take 1-2 Tabs by mouth every 6 hours as needed for Severe Pain.     • zolpidem (AMBIEN) 10 MG Tab Take 10 mg by mouth at bedtime as needed for Sleep.       Current Facility-Administered Medications Ordered in Epic   Medication Dose Route Frequency Provider Last Rate Last Dose   • ertapenem (INVANZ) 1,000 mg in  mL IVPB  1 g Intravenous Q24HRS Trung Sofia, PharmD           Past Medical History:   Diagnosis Date   • Infection 9/4/2017   • Arthritis    • ASTHMA    • Diabetes    • Heart abnormality     leakage in left valve   • Heart valve disease    • Hypertension    • Liver disease    • Seizure disorder (CMS-HCC)        Past Surgical History:   Procedure Laterality Date   • IRRIGATION & DEBRIDEMENT ORTHO Right 9/3/2017    Procedure: IRRIGATION & DEBRIDEMENT ORTHO, POLY EXCHANGE;  Surgeon: Jerome Russell M.D.;  Location: SURGERY [facility];  Service: Orthopedics   • COLONOSCOPY WITH CLIPPING  10/28/2015    Procedure: COLONOSCOPY WITH CLIPPING;  Surgeon: Ruben Colon M.D.;  Location: ENDOSCOPY [facility];  Service:    • COLONOSCOPY WITH SCLEROTHERAPY  10/28/2015    Procedure: COLONOSCOPY WITH SCLEROTHERAPY;  Surgeon: Ruben Colon M.D.;  Location: ENDOSCOPY [facility];  Service:    • COLONOSCOPY WITH TATTOOING  10/28/2015    Procedure: COLONOSCOPY WITH TATTOOING;  Surgeon: Ruben Colon M.D.;  Location: ENDOSCOPY [facility];  Service:    • GYN SURGERY  2003    hysteroscoopy   • GYN SURGERY  1982    tubal ligation       Social History   Substance Use Topics   • Smoking status: Former Smoker     Quit date:  "12/29/2016   • Smokeless tobacco: Never Used      Comment: 10/2010   • Alcohol use No       Family history denied    Review of Systems   Constitutional: Negative for fever, chills, weight loss and malaise/fatigue.        All other systems reviewed and are negative except as in HPI.      Exam: Blood pressure (!) 90/60, pulse (!) 55, temperature 36.9 °C (98.4 °F), height 1.7 m (5' 6.93\"), weight 85.5 kg (188 lb 6.4 oz), last menstrual period 06/02/2008, SpO2 96 %.  General: Normal appearing. No distress.  HEENT: NCAT. PERRLA, EOMI, conjunctiva clear, oropharynx is without erythema, edema or exudates. Dentures  Neck: Supple without JVD or bruit.  Pulmonary: Clear to ausculation.  No rales, rhonchi, or wheezing. Unlabored  Cardiovascular: Regular rate and rhythm without murmur. No ectopy  Abdomen: Soft, nontender, nondistended.  Extremities: No cyanosis, clubbing, or edema +PICC  Neurologic: Alert and oriented X3.  No tremor. Speech normal without dysarthria.  CN intact WALKER.  Lymph: No cervical or supraclavicular lymph nodes are palpable  No rash  Musculoskeletal: right knee incision approx-scabbing.    Warm +effusion  Psych: Normal mood and affect.  Judgment and insight is normal.      Assessment/Plan  1. Infection of prosthetic knee joint, initial encounter (CMS-HCC)      Cxs with Steno. Possible strep viridans-continue Bactrim.  Complete ertapenem. New pain and swelling concerning for abx failure-needs aspiration.   2. Type 2 diabetes mellitus with diabetic neuropathy, unspecified long term insulin use status (CMS-HCC)      Keep BS controlled to help with healing-states usually normal range   3. Alcoholic cirrhosis of liver without ascites (CMS-HCC)      will impair wound healing   4. Chronic hepatitis C without hepatic coma (CMS-HCC)      treated   Orders faxed to Infusion today  Will call Ortho to try to facilitate earlier appt    "

## 2017-10-17 ENCOUNTER — OUTPATIENT INFUSION SERVICES (OUTPATIENT)
Dept: ONCOLOGY | Facility: MEDICAL CENTER | Age: 57
End: 2017-10-17
Attending: INTERNAL MEDICINE
Payer: MEDICAID

## 2017-10-17 VITALS
OXYGEN SATURATION: 100 % | SYSTOLIC BLOOD PRESSURE: 123 MMHG | DIASTOLIC BLOOD PRESSURE: 70 MMHG | TEMPERATURE: 98 F | BODY MASS INDEX: 29.86 KG/M2 | HEIGHT: 67 IN | HEART RATE: 58 BPM | RESPIRATION RATE: 18 BRPM | WEIGHT: 190.26 LBS

## 2017-10-17 PROCEDURE — 700111 HCHG RX REV CODE 636 W/ 250 OVERRIDE (IP): Performed by: INTERNAL MEDICINE

## 2017-10-17 PROCEDURE — 700105 HCHG RX REV CODE 258: Performed by: INTERNAL MEDICINE

## 2017-10-17 PROCEDURE — 96365 THER/PROPH/DIAG IV INF INIT: CPT

## 2017-10-17 RX ORDER — MAGNESIUM OXIDE 400 MG/1
400 TABLET ORAL DAILY
COMMUNITY
End: 2022-07-10

## 2017-10-17 RX ADMIN — SODIUM CHLORIDE 1000 MG: 900 INJECTION INTRAVENOUS at 14:08

## 2017-10-17 ASSESSMENT — PAIN SCALES - GENERAL: PAINLEVEL: 8=MODERATE-SEVERE PAIN

## 2017-10-17 NOTE — PROGRESS NOTES
"Pt presents ambulatory via cane for Invanz infusion. Pt reports that she saw the \"doctor yesterday\" and they are going to \"drain the knee in a week if symptom does not improve\". Pt reports that \"she can't wait that long to have knee drained\", she reports that swelling is impeding ROM and ambulation. Pt reports that she plans to go to the ER to have knee evaluated after her infusion today. PICC accessed and blood return noted. Medication infused per orders without complications or adverse reactions. PICC flushed with saline and gauze with arm wrap applied by Madelin MONTAÑO. Pt left ambulatory via cane at 1445   "

## 2017-10-18 ENCOUNTER — OUTPATIENT INFUSION SERVICES (OUTPATIENT)
Dept: ONCOLOGY | Facility: MEDICAL CENTER | Age: 57
End: 2017-10-18
Attending: INTERNAL MEDICINE
Payer: MEDICAID

## 2017-10-18 VITALS
RESPIRATION RATE: 18 BRPM | SYSTOLIC BLOOD PRESSURE: 118 MMHG | DIASTOLIC BLOOD PRESSURE: 61 MMHG | TEMPERATURE: 99.2 F | OXYGEN SATURATION: 96 % | HEART RATE: 76 BPM

## 2017-10-18 PROCEDURE — 96365 THER/PROPH/DIAG IV INF INIT: CPT

## 2017-10-18 PROCEDURE — 700105 HCHG RX REV CODE 258: Performed by: INTERNAL MEDICINE

## 2017-10-18 PROCEDURE — 700111 HCHG RX REV CODE 636 W/ 250 OVERRIDE (IP): Performed by: INTERNAL MEDICINE

## 2017-10-18 RX ADMIN — SODIUM CHLORIDE 1000 MG: 900 INJECTION INTRAVENOUS at 14:17

## 2017-10-18 ASSESSMENT — PAIN SCALES - GENERAL: PAINLEVEL: 4=SLIGHT-MODERATE PAIN

## 2017-10-18 NOTE — PROGRESS NOTES
Pt presents ambulatory via cane for Invanz infusion.Pt reports that she did not go to ER yesterday and decided to rest and elevate leg, improvement noted and she will wait until appt next week to evaluate if knee aspiration will be needed. PICC accessed and blood return noted. Medication infused per orders without complications or adverse reactions. PICC flushed with saline and gauze with arm wrap applied by Madelin MONTAÑO. Pt left ambulatory via cane at 1455

## 2017-10-19 ENCOUNTER — OUTPATIENT INFUSION SERVICES (OUTPATIENT)
Dept: ONCOLOGY | Facility: MEDICAL CENTER | Age: 57
End: 2017-10-19
Attending: INTERNAL MEDICINE
Payer: MEDICAID

## 2017-10-19 VITALS
DIASTOLIC BLOOD PRESSURE: 54 MMHG | HEART RATE: 63 BPM | TEMPERATURE: 98.6 F | RESPIRATION RATE: 18 BRPM | SYSTOLIC BLOOD PRESSURE: 112 MMHG | OXYGEN SATURATION: 95 %

## 2017-10-19 PROCEDURE — 700111 HCHG RX REV CODE 636 W/ 250 OVERRIDE (IP): Performed by: INTERNAL MEDICINE

## 2017-10-19 PROCEDURE — 96365 THER/PROPH/DIAG IV INF INIT: CPT

## 2017-10-19 PROCEDURE — 700105 HCHG RX REV CODE 258: Performed by: INTERNAL MEDICINE

## 2017-10-19 RX ADMIN — SODIUM CHLORIDE 1000 MG: 900 INJECTION INTRAVENOUS at 14:01

## 2017-10-19 ASSESSMENT — PAIN SCALES - GENERAL: PAINLEVEL: 3=SLIGHT PAIN

## 2017-10-19 NOTE — PROGRESS NOTES
"Patient arrived to Infusion for daily Invanz; reports getting \"sick\" this morning after drinking her coffee too fast.  Denies any other significant changes or concerns.  PICC line flushed with brisk blood return noted. Invanz infused per MAR without incident; upon discharge, pt requested emesis bag, although did not vomit.  Pt stated perhaps she \"drank her orange juice too fast.\" Encouraged pt to follow up with PCP with these symptoms, pt stated she has, and believes it to be side effect to \"medications.\"  PICC line flushed, saline capped and new gauze dressing applied. Pt discharged home in good spirits under no apparent distress.   "

## 2017-10-20 ENCOUNTER — OUTPATIENT INFUSION SERVICES (OUTPATIENT)
Dept: ONCOLOGY | Facility: MEDICAL CENTER | Age: 57
End: 2017-10-20
Attending: INTERNAL MEDICINE
Payer: MEDICAID

## 2017-10-20 VITALS
DIASTOLIC BLOOD PRESSURE: 58 MMHG | TEMPERATURE: 98 F | WEIGHT: 190.26 LBS | BODY MASS INDEX: 29.86 KG/M2 | SYSTOLIC BLOOD PRESSURE: 120 MMHG | RESPIRATION RATE: 18 BRPM | OXYGEN SATURATION: 98 % | HEART RATE: 65 BPM

## 2017-10-20 PROCEDURE — 700111 HCHG RX REV CODE 636 W/ 250 OVERRIDE (IP)

## 2017-10-20 PROCEDURE — 700105 HCHG RX REV CODE 258

## 2017-10-20 PROCEDURE — 96365 THER/PROPH/DIAG IV INF INIT: CPT

## 2017-10-20 RX ADMIN — SODIUM CHLORIDE 1000 MG: 900 INJECTION INTRAVENOUS at 13:32

## 2017-10-20 ASSESSMENT — PAIN SCALES - GENERAL: PAINLEVEL: 4=SLIGHT-MODERATE PAIN

## 2017-10-20 NOTE — PROGRESS NOTES
Patient arrived to Infusion for daily Invanz; reports feeling a little dizzy, but went outside and has since resolved. Denies any other significant changes or concerns.  PICC line flushed with brisk blood return noted. Invanz infused per MAR without incident;  PICC line flushed, saline capped and new gauze dressing applied. Pt discharged home in good spirits under no apparent distress.

## 2017-10-21 ENCOUNTER — OUTPATIENT INFUSION SERVICES (OUTPATIENT)
Dept: ONCOLOGY | Facility: MEDICAL CENTER | Age: 57
End: 2017-10-21
Attending: INTERNAL MEDICINE
Payer: MEDICAID

## 2017-10-21 VITALS
OXYGEN SATURATION: 93 % | DIASTOLIC BLOOD PRESSURE: 72 MMHG | SYSTOLIC BLOOD PRESSURE: 124 MMHG | TEMPERATURE: 98.8 F | HEART RATE: 66 BPM | BODY MASS INDEX: 29.86 KG/M2 | RESPIRATION RATE: 18 BRPM | WEIGHT: 190.26 LBS

## 2017-10-21 PROCEDURE — 700105 HCHG RX REV CODE 258: Performed by: INTERNAL MEDICINE

## 2017-10-21 PROCEDURE — 96365 THER/PROPH/DIAG IV INF INIT: CPT

## 2017-10-21 PROCEDURE — 700111 HCHG RX REV CODE 636 W/ 250 OVERRIDE (IP): Performed by: INTERNAL MEDICINE

## 2017-10-21 RX ADMIN — SODIUM CHLORIDE 1000 MG: 900 INJECTION INTRAVENOUS at 15:30

## 2017-10-21 ASSESSMENT — PAIN SCALES - GENERAL: PAINLEVEL: 3=SLIGHT PAIN

## 2017-10-21 NOTE — PROGRESS NOTES
Pt is here for her daily IVABX. In good spirits. No concerns. PICC dressing changes in sterile fashion. Infusion completed without an incident. Discharged home to self care. Returns daily.

## 2017-10-22 ENCOUNTER — OUTPATIENT INFUSION SERVICES (OUTPATIENT)
Dept: ONCOLOGY | Facility: MEDICAL CENTER | Age: 57
End: 2017-10-22
Attending: INTERNAL MEDICINE
Payer: MEDICAID

## 2017-10-22 VITALS
OXYGEN SATURATION: 94 % | RESPIRATION RATE: 18 BRPM | SYSTOLIC BLOOD PRESSURE: 114 MMHG | DIASTOLIC BLOOD PRESSURE: 73 MMHG | TEMPERATURE: 98.5 F | WEIGHT: 190.26 LBS | HEART RATE: 61 BPM | BODY MASS INDEX: 29.86 KG/M2

## 2017-10-22 PROCEDURE — 700111 HCHG RX REV CODE 636 W/ 250 OVERRIDE (IP)

## 2017-10-22 PROCEDURE — 96365 THER/PROPH/DIAG IV INF INIT: CPT

## 2017-10-22 PROCEDURE — 700105 HCHG RX REV CODE 258

## 2017-10-22 RX ADMIN — SODIUM CHLORIDE 1000 MG: 900 INJECTION INTRAVENOUS at 13:46

## 2017-10-22 ASSESSMENT — PAIN SCALES - GENERAL: PAINLEVEL: 3=SLIGHT PAIN

## 2017-10-22 NOTE — PROGRESS NOTES
Pt is here for her daily IVABX. In good spirits. No concerns. Infusion completed without an incident. Discharged home to self care. Returns daily.

## 2017-10-23 ENCOUNTER — OUTPATIENT INFUSION SERVICES (OUTPATIENT)
Dept: ONCOLOGY | Facility: MEDICAL CENTER | Age: 57
End: 2017-10-23
Attending: INTERNAL MEDICINE
Payer: MEDICAID

## 2017-10-23 VITALS
RESPIRATION RATE: 18 BRPM | HEART RATE: 69 BPM | WEIGHT: 188.27 LBS | BODY MASS INDEX: 29.55 KG/M2 | DIASTOLIC BLOOD PRESSURE: 58 MMHG | SYSTOLIC BLOOD PRESSURE: 115 MMHG | TEMPERATURE: 97.7 F | OXYGEN SATURATION: 93 % | HEIGHT: 67 IN

## 2017-10-23 DIAGNOSIS — T84.59XS INFECTION OF PROSTHETIC KNEE JOINT, SEQUELA: ICD-10-CM

## 2017-10-23 DIAGNOSIS — Z96.659 INFECTION OF PROSTHETIC KNEE JOINT, SEQUELA: ICD-10-CM

## 2017-10-23 LAB
ALBUMIN SERPL BCP-MCNC: 3.5 G/DL (ref 3.2–4.9)
ALP SERPL-CCNC: 117 U/L (ref 30–99)
ALT SERPL-CCNC: 15 U/L (ref 2–50)
AST SERPL-CCNC: 23 U/L (ref 12–45)
BASOPHILS # BLD AUTO: 0.7 % (ref 0–1.8)
BASOPHILS # BLD: 0.02 K/UL (ref 0–0.12)
BILIRUB CONJ SERPL-MCNC: 0.2 MG/DL (ref 0.1–0.5)
BILIRUB INDIRECT SERPL-MCNC: 0.2 MG/DL (ref 0–1)
BILIRUB SERPL-MCNC: 0.4 MG/DL (ref 0.1–1.5)
BUN SERPL-MCNC: 10 MG/DL (ref 8–22)
CALCIUM SERPL-MCNC: 9.3 MG/DL (ref 8.5–10.5)
CHLORIDE SERPL-SCNC: 105 MMOL/L (ref 96–112)
CO2 SERPL-SCNC: 23 MMOL/L (ref 20–33)
CREAT SERPL-MCNC: 0.85 MG/DL (ref 0.5–1.4)
EOSINOPHIL # BLD AUTO: 0.11 K/UL (ref 0–0.51)
EOSINOPHIL NFR BLD: 3.6 % (ref 0–6.9)
ERYTHROCYTE [DISTWIDTH] IN BLOOD BY AUTOMATED COUNT: 45.4 FL (ref 35.9–50)
GFR SERPL CREATININE-BSD FRML MDRD: >60 ML/MIN/1.73 M 2
GLUCOSE SERPL-MCNC: 147 MG/DL (ref 65–99)
HCT VFR BLD AUTO: 31.5 % (ref 37–47)
HGB BLD-MCNC: 10.2 G/DL (ref 12–16)
IMM GRANULOCYTES # BLD AUTO: 0.01 K/UL (ref 0–0.11)
IMM GRANULOCYTES NFR BLD AUTO: 0.3 % (ref 0–0.9)
LYMPHOCYTES # BLD AUTO: 1.05 K/UL (ref 1–4.8)
LYMPHOCYTES NFR BLD: 34.7 % (ref 22–41)
MCH RBC QN AUTO: 27.6 PG (ref 27–33)
MCHC RBC AUTO-ENTMCNC: 32.4 G/DL (ref 33.6–35)
MCV RBC AUTO: 85.4 FL (ref 81.4–97.8)
MONOCYTES # BLD AUTO: 0.2 K/UL (ref 0–0.85)
MONOCYTES NFR BLD AUTO: 6.6 % (ref 0–13.4)
NEUTROPHILS # BLD AUTO: 1.64 K/UL (ref 2–7.15)
NEUTROPHILS NFR BLD: 54.1 % (ref 44–72)
NRBC # BLD AUTO: 0 K/UL
NRBC BLD AUTO-RTO: 0 /100 WBC
PHOSPHATE SERPL-MCNC: 3.6 MG/DL (ref 2.5–4.5)
PLATELET # BLD AUTO: 124 K/UL (ref 164–446)
PMV BLD AUTO: 9.8 FL (ref 9–12.9)
POTASSIUM SERPL-SCNC: 4.3 MMOL/L (ref 3.6–5.5)
PROT SERPL-MCNC: 7.1 G/DL (ref 6–8.2)
RBC # BLD AUTO: 3.69 M/UL (ref 4.2–5.4)
SODIUM SERPL-SCNC: 135 MMOL/L (ref 135–145)
WBC # BLD AUTO: 3 K/UL (ref 4.8–10.8)

## 2017-10-23 PROCEDURE — 700105 HCHG RX REV CODE 258: Performed by: INTERNAL MEDICINE

## 2017-10-23 PROCEDURE — 700111 HCHG RX REV CODE 636 W/ 250 OVERRIDE (IP): Performed by: INTERNAL MEDICINE

## 2017-10-23 PROCEDURE — 80076 HEPATIC FUNCTION PANEL: CPT | Mod: 91

## 2017-10-23 PROCEDURE — 96365 THER/PROPH/DIAG IV INF INIT: CPT

## 2017-10-23 PROCEDURE — 80069 RENAL FUNCTION PANEL: CPT

## 2017-10-23 PROCEDURE — 85025 COMPLETE CBC W/AUTO DIFF WBC: CPT

## 2017-10-23 PROCEDURE — 36592 COLLECT BLOOD FROM PICC: CPT

## 2017-10-23 RX ADMIN — SODIUM CHLORIDE 1000 MG: 900 INJECTION INTRAVENOUS at 14:18

## 2017-10-23 ASSESSMENT — PAIN SCALES - GENERAL: PAINLEVEL: 3=SLIGHT PAIN

## 2017-10-23 NOTE — PROGRESS NOTES
Pt returns to infusion center for IV antibiotics as treatment for Rt knee infection.  PICC line in place, brisk blood return noted.  Labs drawn as ordered.  Pt tolerated infusion without incident.  PICC line flushed with saline per policy, orange cap and gauze/net placed.  Pt left infusion center ambulatory and in good condition, returns daily, appointment confirmed.

## 2017-10-24 ENCOUNTER — OUTPATIENT INFUSION SERVICES (OUTPATIENT)
Dept: ONCOLOGY | Facility: MEDICAL CENTER | Age: 57
End: 2017-10-24
Attending: INTERNAL MEDICINE
Payer: MEDICAID

## 2017-10-24 VITALS
OXYGEN SATURATION: 92 % | BODY MASS INDEX: 29.55 KG/M2 | SYSTOLIC BLOOD PRESSURE: 121 MMHG | DIASTOLIC BLOOD PRESSURE: 62 MMHG | WEIGHT: 188.27 LBS | RESPIRATION RATE: 18 BRPM | TEMPERATURE: 98.4 F | HEART RATE: 65 BPM

## 2017-10-24 PROCEDURE — 96365 THER/PROPH/DIAG IV INF INIT: CPT

## 2017-10-24 PROCEDURE — 700105 HCHG RX REV CODE 258

## 2017-10-24 PROCEDURE — 700111 HCHG RX REV CODE 636 W/ 250 OVERRIDE (IP)

## 2017-10-24 RX ADMIN — SODIUM CHLORIDE 1000 MG: 900 INJECTION INTRAVENOUS at 13:46

## 2017-10-24 ASSESSMENT — PAIN SCALES - GENERAL: PAINLEVEL: 4=SLIGHT-MODERATE PAIN

## 2017-10-24 NOTE — PROGRESS NOTES
Pt arrived to IS ambulatory, here for daily IV abx for infected R knee. Left PICC in place, patent with brisk blood return observed. Pt under impression today was last infusion. Reviewed orders and informed pt that she had been extended until 10/27. Pt verbalized understanding. Invanz infused as prescribed. Pt jim well. Line flushed clear. PICC flushed, clave changed, and new alcohol cap placed. Pt knows to cont to return for daily treatment. Appt for Holter monitor attempted to be moved for pt convenience however, Holter appt unable to be moved. Discharged home under self care in no apparent distress.

## 2017-10-25 ENCOUNTER — OUTPATIENT INFUSION SERVICES (OUTPATIENT)
Dept: ONCOLOGY | Facility: MEDICAL CENTER | Age: 57
End: 2017-10-25
Attending: INTERNAL MEDICINE
Payer: MEDICAID

## 2017-10-25 VITALS
OXYGEN SATURATION: 92 % | SYSTOLIC BLOOD PRESSURE: 116 MMHG | HEART RATE: 65 BPM | RESPIRATION RATE: 18 BRPM | DIASTOLIC BLOOD PRESSURE: 72 MMHG | TEMPERATURE: 98.7 F

## 2017-10-25 PROCEDURE — 700105 HCHG RX REV CODE 258: Performed by: INTERNAL MEDICINE

## 2017-10-25 PROCEDURE — 700111 HCHG RX REV CODE 636 W/ 250 OVERRIDE (IP): Performed by: INTERNAL MEDICINE

## 2017-10-25 PROCEDURE — 96365 THER/PROPH/DIAG IV INF INIT: CPT

## 2017-10-25 RX ADMIN — SODIUM CHLORIDE 1000 MG: 900 INJECTION INTRAVENOUS at 15:02

## 2017-10-25 ASSESSMENT — PAIN SCALES - GENERAL: PAINLEVEL: 3=SLIGHT PAIN

## 2017-10-25 NOTE — PROGRESS NOTES
Pt arrived to IS ambulatory, here for daily IV abx for infected R knee. Left PICC in place, patent with brisk blood return observed. Invanz infused as prescribed. Pt jim well. Line flushed clear. PICC flushed and new alcohol cap placed. Pt knows to cont to return for daily treatment. Appt for Holter monitor unable to be moved. Suggested to pt to call IS to see if she could be taken in early to avoid two trips to [facility] in one day. Pt verbalized understanding. Discharged home under self care in no apparent distress.

## 2017-10-26 ENCOUNTER — NON-PROVIDER VISIT (OUTPATIENT)
Dept: CARDIOLOGY | Facility: MEDICAL CENTER | Age: 57
End: 2017-10-26
Payer: MEDICAID

## 2017-10-26 ENCOUNTER — OUTPATIENT INFUSION SERVICES (OUTPATIENT)
Dept: ONCOLOGY | Facility: MEDICAL CENTER | Age: 57
End: 2017-10-26
Attending: INTERNAL MEDICINE
Payer: MEDICAID

## 2017-10-26 VITALS
BODY MASS INDEX: 29.55 KG/M2 | OXYGEN SATURATION: 94 % | RESPIRATION RATE: 18 BRPM | HEART RATE: 60 BPM | WEIGHT: 188.27 LBS | TEMPERATURE: 98.7 F | SYSTOLIC BLOOD PRESSURE: 114 MMHG | DIASTOLIC BLOOD PRESSURE: 66 MMHG

## 2017-10-26 DIAGNOSIS — I49.1 PREMATURE ATRIAL CONTRACTION: ICD-10-CM

## 2017-10-26 DIAGNOSIS — R00.1 BRADYCARDIA: ICD-10-CM

## 2017-10-26 DIAGNOSIS — I49.3 PVC (PREMATURE VENTRICULAR CONTRACTION): ICD-10-CM

## 2017-10-26 PROCEDURE — 700111 HCHG RX REV CODE 636 W/ 250 OVERRIDE (IP)

## 2017-10-26 PROCEDURE — 700105 HCHG RX REV CODE 258

## 2017-10-26 PROCEDURE — 96365 THER/PROPH/DIAG IV INF INIT: CPT

## 2017-10-26 RX ADMIN — SODIUM CHLORIDE 1000 MG: 900 INJECTION INTRAVENOUS at 16:13

## 2017-10-26 ASSESSMENT — PAIN SCALES - GENERAL: PAINLEVEL: NO PAIN

## 2017-10-26 NOTE — PROGRESS NOTES
Patient arrived to Infusion for daily Invanz; pt reports no changes or concerns.  PICC line flushed, blood return noted.  Invanz infused per MAR without incident.  PICC flushed, saline capped and new gauze dressing applied.  Confirmed pt's appt time tomorrow.  Pt discharged home in great spirits under no apparent distress.

## 2017-10-27 ENCOUNTER — OUTPATIENT INFUSION SERVICES (OUTPATIENT)
Dept: ONCOLOGY | Facility: MEDICAL CENTER | Age: 57
End: 2017-10-27
Attending: INTERNAL MEDICINE
Payer: MEDICAID

## 2017-10-27 VITALS
BODY MASS INDEX: 29.55 KG/M2 | SYSTOLIC BLOOD PRESSURE: 128 MMHG | TEMPERATURE: 98 F | WEIGHT: 188.27 LBS | DIASTOLIC BLOOD PRESSURE: 73 MMHG | OXYGEN SATURATION: 96 % | HEART RATE: 56 BPM | RESPIRATION RATE: 18 BRPM

## 2017-10-27 PROCEDURE — 700111 HCHG RX REV CODE 636 W/ 250 OVERRIDE (IP): Performed by: INTERNAL MEDICINE

## 2017-10-27 PROCEDURE — 306780 HCHG STAT FOR TRANSFUSION PER CASE

## 2017-10-27 PROCEDURE — 96365 THER/PROPH/DIAG IV INF INIT: CPT

## 2017-10-27 PROCEDURE — 700105 HCHG RX REV CODE 258: Performed by: INTERNAL MEDICINE

## 2017-10-27 RX ADMIN — SODIUM CHLORIDE 1000 MG: 900 INJECTION INTRAVENOUS at 14:28

## 2017-10-27 ASSESSMENT — PAIN SCALES - GENERAL: PAINLEVEL: NO PAIN

## 2017-10-27 NOTE — PROGRESS NOTES
Pt is here for her last IVABX. Voices no concerns. She states having a f/u appointment set up. Infusion completed without an incident. PICC line removed upon completion of the treatment. Pt discharged home after 30min monitoring - no s/s SOB/no bleeding at the site. Discharged home to self care. Treatment completed.

## 2017-10-31 LAB — EKG IMPRESSION: NORMAL

## 2017-10-31 PROCEDURE — 93224 XTRNL ECG REC UP TO 48 HRS: CPT | Performed by: INTERNAL MEDICINE

## 2017-11-02 ENCOUNTER — OFFICE VISIT (OUTPATIENT)
Dept: INFECTIOUS DISEASES | Facility: MEDICAL CENTER | Age: 57
End: 2017-11-02
Payer: MEDICAID

## 2017-11-02 VITALS
TEMPERATURE: 98.5 F | DIASTOLIC BLOOD PRESSURE: 68 MMHG | SYSTOLIC BLOOD PRESSURE: 120 MMHG | HEART RATE: 78 BPM | WEIGHT: 181.8 LBS | BODY MASS INDEX: 28.53 KG/M2 | HEIGHT: 67 IN | OXYGEN SATURATION: 95 %

## 2017-11-02 DIAGNOSIS — E11.40 TYPE 2 DIABETES MELLITUS WITH DIABETIC NEUROPATHY, UNSPECIFIED LONG TERM INSULIN USE STATUS: Chronic | ICD-10-CM

## 2017-11-02 DIAGNOSIS — Z96.659 INFECTION OF PROSTHETIC KNEE JOINT, INITIAL ENCOUNTER (HCC): ICD-10-CM

## 2017-11-02 DIAGNOSIS — T84.59XA INFECTION OF PROSTHETIC KNEE JOINT, INITIAL ENCOUNTER (HCC): ICD-10-CM

## 2017-11-02 PROCEDURE — 99213 OFFICE O/P EST LOW 20 MIN: CPT | Performed by: INTERNAL MEDICINE

## 2017-11-02 NOTE — PROGRESS NOTES
"Chief Complaint   Patient presents with   • Follow-Up     Infection of prosthetic knee joint       HISTORY OF PRESENT ILLNESS: Patient is a 57 y.o. female established patient who presents today for f/u  infected right knee arthroplasty  S/p I and D with poly exchange 9/4/2017  Seen by Oksana GOODWIN in the hospital last month but will not see her in f/u due to insurance-states she needs orders to continue her Infusion. Op cxs with steno; one blood cxs with Strep viridans  At [facility] Skilled for about a week  Had been getting ertapenem infusion-due to stop 10/24. C/o nausea with infusions  Tolerating oral Bactrim-denies SE  Primary c/o increased pain and swelling in knee last 2 weeks-sometimes \"hot\"  Feels like it gives out-aapt with Ortho not until 11/26. States her PCP won't aspirate it    11/2  Continued in Infusion through 10/27-on oral Bactrim now  Knee not aspirated  PICC out  Has stiffness in knee-not able to extend well  Tolerated abx well-denies SE  Has one spot she wants to chseck-therapist told her it might be a stple under the skin    Patient Active Problem List    Diagnosis Date Noted   • Infection 09/04/2017     Priority: High   • 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CMS-HCC) 09/04/2017     Priority: High   • History of rheumatic fever 09/04/2017     Priority: Medium   • Normocytic anemia 09/04/2017     Priority: Medium   • Cirrhosis, alcoholic (CMS-HCC) 09/04/2017     Priority: Low   • History of hypertension 09/04/2017     Priority: Low   • Type 2 diabetes mellitus with neurologic complication (CMS-HCC) 09/04/2017     Priority: Low   • Diabetic polyneuropathy associated with type 2 diabetes mellitus (CMS-HCC) 09/04/2017     Priority: Low   • Migraine with aura and without status migrainosus, not intractable 08/15/2017   • Medication overuse headache 08/15/2017   • BRBPR (bright red blood per rectum) 10/27/2015       Allergies:Nkda [no known drug allergy]    Current Outpatient Prescriptions   Medication Sig " Dispense Refill   • magnesium oxide (MAG-OX) 400 MG Tab Take 400 mg by mouth every day.     • potassium chloride ER (KLOR-CON) 10 MEQ tablet      • ondansetron (ZOFRAN ODT) 8 MG TABLET DISPERSIBLE DIS 1 T ON THE TONGUE Q 8 H PRF NAUSEA  0   • sumatriptan (IMITREX) 50 MG Tab TK 1 T PO  QD PRF MIGRAINE  3   • sulfamethoxazole-trimethoprim (BACTRIM DS) 800-160 MG tablet Take 1 Tab by mouth 2 times a day.     • docusate sodium (COLACE) 100 MG Cap Take 100 mg by mouth 2 times a day.     • ferrous sulfate 325 (65 Fe) MG tablet Take 325 mg by mouth every day.     • metformin (GLUCOPHAGE) 850 MG Tab Take 850 mg by mouth 2 times a day, with meals.     • pantoprazole (PROTONIX) 40 MG Tablet Delayed Response Take 40 mg by mouth every day.     • felodipine (PLENDIL) 10 MG TABLET SR 24 HR Take 10 mg by mouth every day.     • gabapentin (NEURONTIN) 300 MG Cap Take 300 mg by mouth 3 times a day.     • losartan (COZAAR) 100 MG Tab Take 100 mg by mouth every day.     • cetirizine (ZYRTEC) 10 MG Tab Take 10 mg by mouth every day.     • zolpidem (AMBIEN) 10 MG Tab Take 10 mg by mouth at bedtime as needed for Sleep.     • oxycodone immediate release (ROXICODONE) 10 MG immediate release tablet TK 1 T PO TID PRN P  0   • albuterol (VENTOLIN OR PROVENTIL) 108 (90 BASE) MCG/ACT Aero Soln inhalation aerosol Inhale 2 Puffs by mouth every 6 hours as needed for Shortness of Breath.     • Oxycodone-Acetaminophen (PERCOCET-10)  MG Tab Take 1-2 Tabs by mouth every 6 hours as needed for Severe Pain.       No current facility-administered medications for this visit.        Social History   Substance Use Topics   • Smoking status: Former Smoker     Quit date: 12/29/2016   • Smokeless tobacco: Never Used      Comment: 10/2010   • Alcohol use No       ROS:  Review of Systems   Constitutional: Negative for fever, chills, weight loss and malaise/fatigue.   All other systems reviewed and are negative except as in HPI.      Exam:  Blood pressure  "120/68, pulse 78, temperature 36.9 °C (98.5 °F), height 1.7 m (5' 6.93\"), weight 82.5 kg (181 lb 12.8 oz), last menstrual period 06/02/2008, SpO2 95 %.  General:  Well nourished, well developed female in NAD. Pleasant and cooperative  Head: NCAT, Pupils are equal round reactive to light. Extraocular movements intact. Oropharynx is clear.  Neck: Supple.  No LAD  Pulmonary: Clear to ausculation.  No rales, rhonchi, or wheezing. Unlabored  Cardiovascular: Regular rate and rhythm without murmur. No ectopy  Abdominal: Soft, nontender, nondistended. Positive bowel sounds.   Skin: No rashes.Well healed scar right knee-0.5 cm lesion lat to scar-no palpable, stitch, staple, or fluid  Extremities: no clubbing, cyanosis, or edema.  Neurologic: Alert and oriented x4. Speech is fluent without dysarthria. Cranial nerves intact. Moving all extremities. Walking with cane    Assessment/Plan:  1. Infection of prosthetic knee joint, initial encounter (CMS-HCC)      Completed IV abx-complete course Bactrim.  Monitor lesion closely ?stitch, not flucuant-contiguous with scar-if drains call Ortho ASAP   2. Type 2 diabetes mellitus with diabetic neuropathy, unspecified long term insulin use status (CMS-HCC)      Control BS to help treat infection   FU as needed  Continue PT   FU Ortho as schedued    Griselda Vazquez M.D.    "

## 2017-11-03 ENCOUNTER — TELEPHONE (OUTPATIENT)
Dept: CARDIOLOGY | Facility: MEDICAL CENTER | Age: 57
End: 2017-11-03

## 2017-11-03 NOTE — TELEPHONE ENCOUNTER
----- Message from Padma Cooper M.D. sent at 11/3/2017  4:14 PM PDT -----  Holter looks good. No significant bradycardia noted. Occasional ectopy. Please advise the patient to avoid caffeine as much as possible.   Follow-up as previously discussed.  Thank you  AA

## 2017-11-03 NOTE — TELEPHONE ENCOUNTER
Called patient, advised her of Dr. Cooper's holter monitor interpretation and instructions. Patient verbalized understanding and agrees with plan.    LEXIE MONTAÑO

## 2018-01-17 ENCOUNTER — HOSPITAL ENCOUNTER (OUTPATIENT)
Dept: RADIOLOGY | Facility: MEDICAL CENTER | Age: 58
End: 2018-01-17
Attending: PHYSICIAN ASSISTANT
Payer: MEDICAID

## 2018-01-17 DIAGNOSIS — K74.60 CIRRHOSIS OF LIVER WITHOUT ASCITES, UNSPECIFIED HEPATIC CIRRHOSIS TYPE (HCC): ICD-10-CM

## 2018-01-17 PROCEDURE — 76705 ECHO EXAM OF ABDOMEN: CPT

## 2018-03-20 ENCOUNTER — HOSPITAL ENCOUNTER (OUTPATIENT)
Dept: RADIOLOGY | Facility: MEDICAL CENTER | Age: 58
End: 2018-03-20
Attending: FAMILY MEDICINE
Payer: MEDICAID

## 2018-03-20 DIAGNOSIS — N95.0 POSTMENOPAUSAL BLEEDING: ICD-10-CM

## 2018-03-20 PROCEDURE — 76830 TRANSVAGINAL US NON-OB: CPT

## 2018-06-03 ENCOUNTER — APPOINTMENT (OUTPATIENT)
Dept: RADIOLOGY | Facility: MEDICAL CENTER | Age: 58
End: 2018-06-03
Attending: EMERGENCY MEDICINE
Payer: MEDICAID

## 2018-06-03 ENCOUNTER — HOSPITAL ENCOUNTER (EMERGENCY)
Facility: MEDICAL CENTER | Age: 58
End: 2018-06-03
Attending: EMERGENCY MEDICINE
Payer: MEDICAID

## 2018-06-03 VITALS
BODY MASS INDEX: 29.07 KG/M2 | RESPIRATION RATE: 16 BRPM | SYSTOLIC BLOOD PRESSURE: 130 MMHG | TEMPERATURE: 98.1 F | DIASTOLIC BLOOD PRESSURE: 81 MMHG | WEIGHT: 185.19 LBS | HEIGHT: 67 IN | HEART RATE: 54 BPM | OXYGEN SATURATION: 99 %

## 2018-06-03 DIAGNOSIS — S09.90XA CLOSED HEAD INJURY, INITIAL ENCOUNTER: ICD-10-CM

## 2018-06-03 DIAGNOSIS — S83.8X2A SPRAIN OF OTHER LIGAMENT OF LEFT KNEE, INITIAL ENCOUNTER: ICD-10-CM

## 2018-06-03 DIAGNOSIS — S70.02XA CONTUSION OF LEFT HIP, INITIAL ENCOUNTER: ICD-10-CM
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
## 2018-06-03 PROCEDURE — 72128 CT CHEST SPINE W/O DYE: CPT

## 2018-06-03 PROCEDURE — 99284 EMERGENCY DEPT VISIT MOD MDM: CPT

## 2018-06-03 PROCEDURE — 70450 CT HEAD/BRAIN W/O DYE: CPT

## 2018-06-03 PROCEDURE — 302875 HCHG BANDAGE ACE 4 OR 6""

## 2018-06-03 PROCEDURE — A9270 NON-COVERED ITEM OR SERVICE: HCPCS | Performed by: EMERGENCY MEDICINE

## 2018-06-03 PROCEDURE — 72125 CT NECK SPINE W/O DYE: CPT

## 2018-06-03 PROCEDURE — 73562 X-RAY EXAM OF KNEE 3: CPT | Mod: LT

## 2018-06-03 PROCEDURE — 73502 X-RAY EXAM HIP UNI 2-3 VIEWS: CPT | Mod: LT

## 2018-06-03 PROCEDURE — 700102 HCHG RX REV CODE 250 W/ 637 OVERRIDE(OP): Performed by: EMERGENCY MEDICINE

## 2018-06-03 RX ORDER — HYDROCODONE BITARTRATE AND ACETAMINOPHEN 5; 325 MG/1; MG/1
1 TABLET ORAL EVERY 6 HOURS PRN
Qty: 10 TAB | Refills: 0 | Status: SHIPPED | OUTPATIENT
Start: 2018-06-03 | End: 2018-06-06

## 2018-06-03 RX ORDER — OXYCODONE HYDROCHLORIDE 10 MG/1
10 TABLET ORAL EVERY 4 HOURS PRN
Status: SHIPPED | COMMUNITY
End: 2020-01-27

## 2018-06-03 RX ORDER — HYDROCODONE BITARTRATE AND ACETAMINOPHEN 5; 325 MG/1; MG/1
1 TABLET ORAL ONCE
Status: COMPLETED | OUTPATIENT
Start: 2018-06-03 | End: 2018-06-03

## 2018-06-03 RX ADMIN — HYDROCODONE BITARTRATE AND ACETAMINOPHEN 1 TABLET: 5; 325 TABLET ORAL at 12:41

## 2018-06-03 ASSESSMENT — PAIN SCALES - GENERAL
PAINLEVEL_OUTOF10: 10
PAINLEVEL_OUTOF10: 4
PAINLEVEL_OUTOF10: 10

## 2018-06-03 NOTE — ED NOTES
Medicated post return from radiology. Rounded on post pain medication - states pain unchanged. Friend at bedside. Patient ambulated to restroom and back.

## 2018-06-03 NOTE — ED NOTES
Assisted with pt care. Pt D/C to home. D/C instructions and prescriptions given. Pt v/u. Controlled substance consent signed and on chart. Pt leaves ED with no acute changes, complaints or concerns.

## 2018-06-03 NOTE — ED PROVIDER NOTES
ED Provider Note    CHIEF COMPLAINT  Chief Complaint   Patient presents with   • T-5000 FALL   • Headache   • Leg Pain       HPI  April Alicia is a 58 y.o. female who presents complaining of headache, left hip and left knee pain after slipping at the grocery store on some yogurt that was in the aisle and falling on her head and left side. The patient has a right knee replacement that is still healing from a few months ago. She states she is due to get a left knee replacement in the next few months. She states that her left leg was bent back behind her when she fell. She was able to get up and walk. She has a headache but denies any loss of consciousness or vision changes. She also has some neck pain. She denies any extremity numbness or weakness. She denies any abdominal pain, chest pain or shortness of breath. She was able to ambulate after the accident. She states that she does take a blood thinning medication prescribed by her cardiologist. The patient states that she also normally takes chronic pain medication, but her doctor has retired and she is waiting to see a new primary care physician to get her medications represcribed.    REVIEW OF SYSTEMS    HEENT:  No ear pain, positive headache or dizziness, no dental or facial pain no loss of consciousness  EYES: no disharge or vision changes  CARDIAC: no chest pain, palpitaions   PULMONARY: no dyspnea, hematemesis or chest wall contusions  GI: no vomiting abdominal pain or contusions  BACK: Positive lower back pain, no extremity numbness or weakness  Neuro: no weakness, numbness aphasia. Positive headache  Musculoskeletal: no swelling deformity or pain no joint swelling. Positive left knee pain  SKIN: no erythema or contusions     See history of present illness all other systems are negative        PAST MEDICAL HISTORY  Past Medical History:   Diagnosis Date   • Arthritis    • ASTHMA    • Diabetes    • Heart abnormality     leakage in left valve   •  Heart valve disease    • Hypertension    • Infection 9/4/2017   • Liver disease    • Seizure disorder (HCC)        FAMILY HISTORY  History reviewed. No pertinent family history.    SOCIAL HISTORY  Social History     Social History   • Marital status: Single     Spouse name: N/A   • Number of children: N/A   • Years of education: N/A     Social History Main Topics   • Smoking status: Former Smoker     Quit date: 12/29/2016   • Smokeless tobacco: Never Used      Comment: 10/2010   • Alcohol use No   • Drug use: No   • Sexual activity: Not on file     Other Topics Concern   • Not on file     Social History Narrative   • No narrative on file       SURGICAL HISTORY  Past Surgical History:   Procedure Laterality Date   • IRRIGATION & DEBRIDEMENT ORTHO Right 9/3/2017    Procedure: IRRIGATION & DEBRIDEMENT ORTHO, POLY EXCHANGE;  Surgeon: Jerome Russell M.D.;  Location: SURGERY [facility];  Service: Orthopedics   • COLONOSCOPY WITH CLIPPING  10/28/2015    Procedure: COLONOSCOPY WITH CLIPPING;  Surgeon: Ruben Colon M.D.;  Location: ENDOSCOPY [facility];  Service:    • COLONOSCOPY WITH SCLEROTHERAPY  10/28/2015    Procedure: COLONOSCOPY WITH SCLEROTHERAPY;  Surgeon: Ruben Colon M.D.;  Location: ENDOSCOPY [facility];  Service:    • COLONOSCOPY WITH TATTOOING  10/28/2015    Procedure: COLONOSCOPY WITH TATTOOING;  Surgeon: Ruben Colon M.D.;  Location: ENDOSCOPY [facility];  Service:    • GYN SURGERY  2003    hysteroscoopy   • GYN SURGERY  1982    tubal ligation       CURRENT MEDICATIONS  Home Medications     Reviewed by Demetrius Canales (Pharmacy Tech) on 06/03/18 at 1159  Med List Status: Complete   Medication Last Dose Status   albuterol (VENTOLIN OR PROVENTIL) 108 (90 BASE) MCG/ACT Aero Soln inhalation aerosol 6/2/2018 Active   cetirizine (ZYRTEC) 10 MG Tab 6/2/2018 Active   docusate sodium (COLACE) 100 MG Cap 6/3/2018 Active   Edoxaban Tosylate (SAVAYSA) 30 MG Tab 6/1/2018 Active  "  felodipine (PLENDIL) 10 MG TABLET SR 24 HR 6/2/2018 Active   gabapentin (NEURONTIN) 300 MG Cap 6/3/2018 Active   losartan (COZAAR) 100 MG Tab 6/3/2018 Active   magnesium oxide (MAG-OX) 400 MG Tab 6/3/2018 Active   metformin (GLUCOPHAGE) 850 MG Tab 6/3/2018 Active   oxyCODONE immediate release (ROXICODONE) 10 MG immediate release tablet > 3 days Active   pantoprazole (PROTONIX) 40 MG Tablet Delayed Response 6/3/2018 Active   potassium chloride ER (KLOR-CON) 10 MEQ tablet 6/3/2018 Active   sulfamethoxazole-trimethoprim (BACTRIM DS) 800-160 MG tablet 6/3/2018 Active                ALLERGIES  Allergies   Allergen Reactions   • Nkda [No Known Drug Allergy]        PHYSICAL EXAM  VITAL SIGNS: /81   Pulse (!) 56   Temp 36.7 °C (98.1 °F)   Resp 18   Ht 1.702 m (5' 7\") Comment: Stated  Wt 84 kg (185 lb 3 oz)   LMP 06/02/2008   SpO2 98%   BMI 29.00 kg/m²   [id number]: GCS 15, ABC's intact   HENT: Normocephalic, atraumatic. No step-offs or crepitance. No scalp hematoma  Eyes: PERRLA, EOMI, Conjunctiva normal, No discharge.   Neck: Positive paraspinous muscle tenderness to palpation without bony step-offs or crepitance of the C-spine   Cardiovascular: Normal heart rate, Normal rhythm, No murmurs, No rubs, No gallops.   Thorax & Lungs: Normal breath sounds, No respiratory distress, No wheezing, No chest tenderness. No subcutaneous emphysema or paradoxical chest wall movements  Abdomen: Bowel sounds normal, Soft, No tenderness, No masses, No pulsatile masses, no abdominal wall contusions  Skin: Warm, Dry, No erythema, No rash no contusions or abrasions   Back: Positive right sided low back pain in the paraspinous musculature of the LS spine without bony step-offs or crepitance  Extremities: Intact distal pulses, No edema, No tenderness, No cyanosis, No clubbing.   Musculoskeletal: Redness to palpation of the left knee and the left hip without bony step-offs or crepitance. She does have full range of motion " with some tenderness. She has no instability on ligamentous stress of the knee. She is able to do a straight leg raise without difficulty. Distally, she is neurovascularly intact with normal sensation, normal cap refill. Normal tendon function and no ankle instability or tenderness  Neurologic: Alert & oriented x 3, Normal motor function, Normal sensory function, No focal deficits noted.     RADIOLOGY/PROCEDURES  DX-KNEE 3 VIEWS LEFT   Final Result      Severe osteoarthritis with joint effusion.      No evidence of acute bony injury.               INTERPRETING LOCATION:  [address] [facility], [place], [address]      CT-TSPINE W/O PLUS RECONS   Final Result         No acute fracture or subluxation of the thoracic spine.      CT-CSPINE WITHOUT PLUS RECONS   Final Result      No evidence of cervical spine fracture.      CT-HEAD W/O   Final Result      Head CT without contrast within normal limits. No evidence of acute cerebral infarction, hemorrhage or mass lesion.      DX-HIP-COMPLETE - UNILATERAL 2+ LEFT   Final Result      No radiographic evidence of acute traumatic injury.            COURSE & MEDICAL DECISION MAKING  Pertinent Labs & Imaging studies reviewed. (See chart for details)  Eventual diagnosis: In cranial hemorrhage versus concussion, cervical strain versus fracture. Low back strain versus fracture, hip and knee sprain versus fracture    . I gave the patient and Norco here for the pain. I informed her that her workup was negative for any acute fractures. She keep her follow up appointment with her orthopedist regarding her knee, and until then. We'll place her on crutches and an Ace wrap. I did prescribe her some narcotics for her acute pain from this injury. She'll be discharged home in the care of family in stable condition.      In prescribing controlled substances to this patient, I certify that I have obtained and reviewed the medical history of April Alicia. I have also made a good lyle effort to  obtain applicable records from other providers who have treated the patient and records demonstrating the following: an elevated score of 471. However, the patient is on chronic medications and now has an acute painful condition.     I have conducted a physical exam and documented it. I have reviewed Ms. Alicia’s prescription history as maintained by the [place] Prescription Monitoring Program.     I have assessed the patient’s risk for abuse, dependency, and addiction using the validated Opioid Risk Tool available at https://www.mdcalc.com/fehgct-jqti-eljk-ort-narcotic-abuse.     Given the above, I believe the benefits of controlled substance therapy outweigh the risks. The reasons for prescribing controlled substances include in my professional opinion, controlled substances are the only reasonable choice for this patient because she has had an acute fall on the joint that is about to be replaced in the next several weeks. Accordingly, I have discussed the risk and benefits, treatment plan, and alternative therapies with the patient.     Yashira Patel M.D.  [address] [facility] [address]  [phone]    Call in 1 day  to establish care, for recheck    Current Outpatient Prescriptions   Medication Sig Dispense Refill   • oxyCODONE immediate release (ROXICODONE) 10 MG immediate release tablet Take 10 mg by mouth every 6 hours as needed for Severe Pain.     • Edoxaban Tosylate (SAVAYSA) 30 MG Tab Take 1 Tab by mouth every morning.     • HYDROcodone-acetaminophen (NORCO) 5-325 MG Tab per tablet Take 1 Tab by mouth every 6 hours as needed for up to 3 days. 10 Tab 0   • magnesium oxide (MAG-OX) 400 MG Tab Take 400 mg by mouth every day.     • potassium chloride ER (KLOR-CON) 10 MEQ tablet Take 10 mEq by mouth 2 times a day.     • sulfamethoxazole-trimethoprim (BACTRIM DS) 800-160 MG tablet Take 1 Tab by mouth 2 times a day.     • docusate sodium (COLACE) 100 MG Cap Take 100 mg by mouth 2 times a day.     •  metformin (GLUCOPHAGE) 850 MG Tab Take 850 mg by mouth 2 times a day, with meals.     • pantoprazole (PROTONIX) 40 MG Tablet Delayed Response Take 40 mg by mouth every day.     • felodipine (PLENDIL) 10 MG TABLET SR 24 HR Take 10 mg by mouth every day.     • gabapentin (NEURONTIN) 300 MG Cap Take 300 mg by mouth 3 times a day.     • losartan (COZAAR) 100 MG Tab Take 100 mg by mouth every day.     • cetirizine (ZYRTEC) 10 MG Tab Take 10 mg by mouth every day.     • albuterol (VENTOLIN OR PROVENTIL) 108 (90 BASE) MCG/ACT Aero Soln inhalation aerosol Inhale 2 Puffs by mouth every 6 hours as needed for Shortness of Breath.             FINAL IMPRESSION  1. Fall, initial encounter    2. Closed head injury, initial encounter    3. Sprain of other ligament of left knee, initial encounter    4. Contusion of left hip, initial encounter            Electronically signed by: Tamica Howell, 6/3/2018

## 2018-06-03 NOTE — ED NOTES
Med Rec complete per PT at bedside   Allergies Reviewed    Pt reports taking BACTRIM BID since 08/2017    Pt reports not taking SAVAYSA 30 mg since 6/1 because she is out.

## 2018-06-04 ENCOUNTER — PATIENT OUTREACH (OUTPATIENT)
Dept: HEALTH INFORMATION MANAGEMENT | Facility: OTHER | Age: 58
End: 2018-06-04

## 2018-06-04 NOTE — PROGRESS NOTES
Placed discharge outreach phone call to pt s/p ER discharge 6/3/18.  Received recording stating that pt's voice mailbox is full and no further messages can be left.  Discharge outreach letter mailed to pt.

## 2018-06-04 NOTE — LETTER
April Alicia  [address] [place] [address]    June 4, 2018      Dear April Alicia,    [facility] wants to ensure your discharge home is safe and you or your loved ones have had all of your questions answered regarding your care after you leave the hospital.    Our discharge team was unsuccessful in our attempts to contact you telephonically and we wanted to be sure that you had a list of resources and contact information should you have any questions regarding your hospital stay, follow-up instructions, or active medical symptoms.    Questions or Concerns Regarding… Contact   Medical Questions Related to Your Discharge  (7 days a week, 8am-5pm) Contact a Nurse Care Coordinator   [phone]   Medical Questions Not Related to Your Discharge  (24 hours a day / 7 days a week)  Contact the Nurse Health Line   [phone]    Medications or Discharge Instructions Refer to your discharge packet   or contact your -[phone]   Follow-up Appointment(s) Schedule your appointment via [employer]   or contact Scheduling [phone]   Billing Review your statement via [employer]  or contact Billing [phone]   Medical Records Review your records via [employer]   or contact Medical Records [phone]     You can also easily access your medical information, test results and upcoming appointments via the [employer] free online health management tool. You can learn more and sign up at [employer]. For assistance setting up your [employer] account, please call [phone].    Once again, we want to ensure your discharge home is safe and that you have a clear understanding of any next steps in your care. If you have any questions or concerns, please do not hesitate to contact us, we are here for you. Thank you for choosing [facility] for your healthcare needs.    Sincerely,      Your [facility] Healthcare Team

## 2018-06-22 ENCOUNTER — HOSPITAL ENCOUNTER (EMERGENCY)
Facility: MEDICAL CENTER | Age: 58
End: 2018-06-22
Payer: MEDICAID

## 2018-06-22 VITALS
SYSTOLIC BLOOD PRESSURE: 118 MMHG | RESPIRATION RATE: 16 BRPM | TEMPERATURE: 99.4 F | DIASTOLIC BLOOD PRESSURE: 66 MMHG | HEART RATE: 75 BPM | OXYGEN SATURATION: 94 %

## 2018-06-22 PROCEDURE — 302449 STATCHG TRIAGE ONLY (STATISTIC)

## 2018-06-27 ENCOUNTER — HOSPITAL ENCOUNTER (EMERGENCY)
Facility: MEDICAL CENTER | Age: 58
End: 2018-06-27
Attending: EMERGENCY MEDICINE
Payer: MEDICAID

## 2018-06-27 ENCOUNTER — APPOINTMENT (OUTPATIENT)
Dept: RADIOLOGY | Facility: MEDICAL CENTER | Age: 58
End: 2018-06-27
Attending: EMERGENCY MEDICINE
Payer: MEDICAID

## 2018-06-27 VITALS
RESPIRATION RATE: 17 BRPM | BODY MASS INDEX: 28.58 KG/M2 | DIASTOLIC BLOOD PRESSURE: 82 MMHG | HEART RATE: 68 BPM | WEIGHT: 182.1 LBS | HEIGHT: 67 IN | TEMPERATURE: 98.3 F | SYSTOLIC BLOOD PRESSURE: 146 MMHG | OXYGEN SATURATION: 96 %

## 2018-06-27 DIAGNOSIS — S09.90XA CLOSED HEAD INJURY, INITIAL ENCOUNTER: ICD-10-CM

## 2018-06-27 DIAGNOSIS — S00.03XA HEMATOMA OF SCALP, INITIAL ENCOUNTER: ICD-10-CM

## 2018-06-27 PROCEDURE — 99284 EMERGENCY DEPT VISIT MOD MDM: CPT

## 2018-06-27 PROCEDURE — 70450 CT HEAD/BRAIN W/O DYE: CPT

## 2018-06-27 RX ORDER — ONDANSETRON 4 MG/1
4 TABLET, FILM COATED ORAL EVERY 6 HOURS PRN
Qty: 10 TAB | Refills: 1 | Status: SHIPPED | OUTPATIENT
Start: 2018-06-27 | End: 2018-06-30

## 2018-06-27 RX ORDER — HYDROCODONE BITARTRATE AND ACETAMINOPHEN 5; 325 MG/1; MG/1
1 TABLET ORAL EVERY 6 HOURS PRN
Qty: 14 TAB | Refills: 0 | Status: SHIPPED | OUTPATIENT
Start: 2018-06-27 | End: 2018-07-01

## 2018-06-27 NOTE — ED TRIAGE NOTES
Chief Complaint   Patient presents with   • T-5000 Head Injury     Pt was seen here on 6/3/2018 after slipping and falling and hitting head.  Pt states she fell again several days ago and left before being seen here which appears to be on 6/22/2018.     • Headache     x 5 days.    Pt to triage in NAD.  Pt reports she takes Savaysa.  Pt AA&Ox4.  Pt educated on triage process and instructed to notify triage RN of any change in status.

## 2018-06-28 NOTE — ED NOTES
Pt verbalized understanding of discharge and follow up instructions.  Pt given Rx.  VSS.  All questions answered, ambulates with steady gait to discharge.

## 2018-06-28 NOTE — ED NOTES
Assisted pt to and from restroom, stand by assist. Pt requested additional gown to cover her backside, gown provided.

## 2018-06-28 NOTE — DISCHARGE INSTRUCTIONS
"Mild Traumatic Brain Injury  Mild traumatic brain injury (TBI) is damage to brain tissue from a blow to the head or to the body. This blow causes the brain to rapidly move back and forth within the skull. The injury changes the way your brain normally works.  CAUSES  Falls are the most common cause of mild traumatic brain injury. Other causes include motor vehicle accidents and sports-related injuries.  SYMPTOMS  Symptoms depend on the type and extent of the injury. Symptoms can last minutes to hours and may include:  · Scalp swelling. A large bump may develop under the skin.  · Loss of consciousness.  · Fatigue or drowsiness.  · Sleep disturbances including sleeping more or less than usual or having trouble falling asleep.  · Headache.  · Being unable to remember events surrounding the injury (amnesia).  · Confusion, disorientation, or feeling mentally foggy.  · Concentration or memory problems.  · Nausea or vomiting.  · Dizziness.  · Irritability or feeling more emotional.  · Balance problems.  · Visual problems including sensitivity to light.  · Sensitivity to noise.  · Difficulty speaking. You may have slurred speech or a delay when following directions or answering questions.  · Twitching or shaking (seizures).  · Numbness or tingling.  In a few cases, someone with a mild TBI will experience \"post-concussion syndrome.\" Post-concussion syndrome is a group of symptoms that can occur after a head injury. It is characterized by headaches, dizziness, difficulty with concentration or thinking, and problems with mood. These symptoms occur for a few weeks to a few months and usually go away without treatment.   DIAGNOSIS  Your caregiver can usually make the diagnosis of mild TBI by asking you what happened and by your exam. If your caregiver is concerned about a more serious TBI, he or she may ask for testing. Testing may include getting a CT (computed tomography) scan of the brain.   TREATMENT   · Only take medicine " for pain or other symptoms as directed by your caregiver.  · Review your current medicines with your caregiver to make sure it is okay to keep taking them. Do not stop regular medicines unless told to do so.  · If there was a direct blow to your head, you may apply an ice pack to the injured area to reduce pain and swelling.  · Put ice in a plastic bag.  · Place a towel between your skin and the bag.  · Leave the ice on for 10 to 15 minutes every hour while you are awake for up to 48 hours after the injury. Ask your caregiver if you should use ice longer than 48 hours.  HOME CARE INSTRUCTIONS   Almost everyone recovers completely from a mild TBI. You must give your brain and body enough time for recovery. As symptoms decrease, you may begin to gradually return to your daily activities. If symptoms worsen or return, lessen your activities, then try again to increase your activities slowly.   Rest  · Get plenty of sleep at night.  · Avoid staying up late at night.  · Keep the same bedtime hours on weekends and weekdays.  · Rest during the day as needed. Take daytime naps or rest breaks when you feel tired or fatigued.  Brain (Cognitive) Rest  Rest your brain. Limit activities that require a lot of thought or concentration. Those activities can make symptoms worse. Avoid or minimize:  · Computer work.  · Homework or job-related work.  · Watching TV.  · Playing video games.  · Talking on the phone.  · Text messaging.  · Listening to loud music.  · Activities such as balancing a checkbook.  · Making important decisions. If you need to make an important decision, get help from a trusted family member or friend.  Activity  Talk to your caregiver about activities you should avoid until you recover. You may need to avoid some or all of your common activities, such as:  · School.  · Work.  · Driving.  · Air travel.  · Recreation, such as:  · Contact sports.  · Running.  · Riding roller coasters and other high-speed amusement  park rides.  · Bicycling.  · Skiing.  · Ice or inline skating.  · Horseback riding.  · Skateboarding.  · Swimming. If you do go swimming, do not swim by yourself.  · Physical exercise, physical education class, working out, weight training, weightlifting, or heavy lifting.  Nutrition  · Follow a normal diet and fluid intake.  · Avoid or limit alcoholic beverages.  Follow-up Appointments  Keep all follow-up appointments. Repeated evaluation of your symptoms is recommended for your recovery. Ask your caregiver when it will be safe to return to your regular activities. Ask your caregiver for help with written recommendations for your employer. It may be helpful to return to your job gradually.  Return to School or Work  · Inform your teachers, school nurse, school counselor, , , or  about your injury, symptoms, and restrictions. They should be instructed to report:  · Increased problems with attention or concentration.  · Increased problems remembering or learning new information.  · Increased time needed to complete tasks or assignments.  · Increased irritability or decreased ability to cope with stress.  · Increased symptoms.  PREVENTION  Protect your head from future injury. It is very important to avoid another head or brain injury before you have recovered. In rare cases, another injury can lead to permanent brain damage, brain swelling, or death.  · Get a helmet that is fitted correctly. Wear your helmet during activities such as bicycling or horseback riding.  · Wear a seat belt when driving and when you are a passenger.  · Prevent falls in the home by:  · Removing clutter and tripping hazards from floors and stairways.  · Using grab bars in bathrooms and handrails by stairs.  · Placing non-slip mats on floors and in bathtubs.  · Improving lighting in dim areas.  SEEK IMMEDIATE MEDICAL CARE IF:   · You have severe or worsening headaches.  · You have worsening drowsiness or  confusion.  · You cannot recognize people or places.  · You have unusual behavior changes.  · You have unusual restlessness or unsteadiness, or increasing irritability.  · You have a seizure.  · You have vision problems.  · You develop a fever or repeated vomiting.  · You have neck pain or a stiff neck.  · You lose bowel or bladder control.  · You have weakness or numbness in any part of the body.  · You have slurred speech.  MAKE SURE YOU:  · Understand these instructions.  · Will watch your condition.  · Will get help right away if you are not doing well or get worse.  Document Released: 01/20/2012 Document Revised: 03/11/2013 Document Reviewed: 01/20/2012  [employer]® Patient Information ©2014 [employer], [employer].

## 2018-06-28 NOTE — ED PROVIDER NOTES
ED Provider Note    HPI: Patient is a 58-year-old female who presented to the emergency department June 27, 2018 at 4:33 PM with a chief complaint of head injury.    Patient was seen here 4 weeks ago after head injury. At that time CT imaging of the head and C-spine and T-spine were obtained which were all unrevealing. The patient presents due to persistent headaches. She's had some nausea but no vomiting. No extremity pain numbness or tingling no chest pain or shortness of breath. No weakness. She does note a scalp hematoma. No other somatic complaints    Review of Systems: Positive for headache, somewhat painful scalp hematoma negative for extremity pain numbness tingling chest pain shortness of breath weakness. Review of systems reviewed with patient, all other systems negative    Past medical/surgical history: Alcoholic cirrhosis hypertension diabetes    Medications: Albuterol Zyrtec Cozaar Neurontin Plendil Protonix Glucophage Savaysa       Allergies: None    Social History: Previous history smoking positive alcohol use history      Physical exam: Constitutional: Somewhat obese female awake alert appeared anxious  Vital signs:  Temperature 98.3 pulse 71 respirations 20 blood pressure 146/75  EYES: PERRL, EOMI, Conjunctivae and sclera normal, eyelids normal bilaterally.  Neck: Trachea midline. No cervical masses seen or palpated. Normal range of motion, supple. No meningeal signs elicited.  Cardiac: Regular rate and rhythm. S1-S2 present. No S3 or S4 present. No murmurs, rubs, or gallops heard. No edema or varicosities were seen.   Lungs: Clear to auscultation with good aeration throughout. No wheezes, rales, or rhonchi heard. Patient's chest wall moved symmetrically with each respiratory effort. Patient was not making use of accessory muscles of respiration in breathing.  Abdomen: Soft nontender to palpation. No rebound or guarding elicited. No organomegaly identified. No pulsatile abdominal masses identified.    Musculoskeletal:  no  pain with palpitation or movement of muscle, bone or joint , no obvious musculoskeletal deformities identified.  Neurologic: alert and awake answers questions appropriately. Moves all four extremities independently, no gross focal abnormalities identified. Normal strength and motor.  Skin: Approximately 1 inch in diameter scalp hematoma is noted in the occipital region. No other rash or lesion seen, no other palpable dermatologic lesions identified.  Psychiatric: Patient appeared to be somewhat anxious. She was not delusional or hallucinating    Medical decision making:  CT scan of the head obtained. Scalp hematoma seen. There is no evidence of intracranial bleeding.    Patient discharged on Zofran and a small amount of Norco (see below) patient appears to have a concussion. She'll be given follow-up with her primary care provider. She is carefully counseled return to ED for his increasing visual disturbance vomiting or any other problems    Patient verbalized understanding of these instructions and states she will complyI reviewed prescription monitoring program for patient's narcotic use before prescribing a scheduled drug.The patient will not drink alcohol nor drive with prescribed medications. The patient will return for new or worsening symptoms and is stable at the time of discharge.        In prescribing controlled substances to this patient, I certify that I have obtained and reviewed the medical history of Ms. April Alicia. Review of this does show a prescription history for a significant number of narcotics from a single provider a prolonged period time but none recently. This would appear to be treatment of chronic medical condition and she now has an acute problem. I have also made a good lyle effort to obtain applicable records from other providers who have treated the patient and records did not demonstrate any increased risk of substance abuse that would prevent me from  prescribing controlled substances.      I have conducted a physical exam and documented it. I have reviewed M prescription history as maintained by the [place] Prescription Monitoring Program.      I have assessed the patient’s risk for abuse, dependency, and addiction using the validated Opioid Risk Tool available at https://www.mdcalc.com/ahufwd-fkpl-sgmh-ort-narcotic-abuse.      Given the above, I believe the benefits of controlled substance therapy outweigh the risks. The reasons for prescribing controlled substances include in my professional opinion, controlled substances are the only reasonable choice for this patientAccordingly, I have discussed the risk and benefits, treatment plan, and alternative therapies with the patient.       Impression closed head injury  2) scalp hematoma

## 2018-07-03 ENCOUNTER — APPOINTMENT (OUTPATIENT)
Dept: RADIOLOGY | Facility: MEDICAL CENTER | Age: 58
End: 2018-07-03
Attending: PHYSICIAN ASSISTANT
Payer: MEDICAID

## 2018-08-28 ENCOUNTER — HOSPITAL ENCOUNTER (OUTPATIENT)
Dept: RADIOLOGY | Facility: MEDICAL CENTER | Age: 58
End: 2018-08-28
Attending: PHYSICIAN ASSISTANT
Payer: MEDICAID

## 2018-08-28 DIAGNOSIS — K29.70 GASTRITIS WITHOUT BLEEDING, UNSPECIFIED CHRONICITY, UNSPECIFIED GASTRITIS TYPE: ICD-10-CM

## 2018-08-28 DIAGNOSIS — I85.00 ESOPHAGEAL VARICES WITHOUT BLEEDING, UNSPECIFIED ESOPHAGEAL VARICES TYPE (HCC): ICD-10-CM

## 2018-08-28 DIAGNOSIS — K70.30 ALCOHOLIC CIRRHOSIS, UNSPECIFIED WHETHER ASCITES PRESENT (HCC): ICD-10-CM

## 2018-08-28 PROCEDURE — 76705 ECHO EXAM OF ABDOMEN: CPT

## 2019-02-08 ENCOUNTER — HOSPITAL ENCOUNTER (OUTPATIENT)
Dept: RADIOLOGY | Facility: MEDICAL CENTER | Age: 59
End: 2019-02-08
Attending: PHYSICIAN ASSISTANT
Payer: MEDICAID

## 2019-02-08 DIAGNOSIS — K74.60 CIRRHOSIS OF LIVER WITHOUT ASCITES, UNSPECIFIED HEPATIC CIRRHOSIS TYPE (HCC): ICD-10-CM

## 2019-02-08 DIAGNOSIS — I85.00 IDIOPATHIC ESOPHAGEAL VARICES WITHOUT BLEEDING (HCC): ICD-10-CM

## 2019-02-08 PROCEDURE — 76705 ECHO EXAM OF ABDOMEN: CPT

## 2019-02-14 ENCOUNTER — HOSPITAL ENCOUNTER (EMERGENCY)
Facility: MEDICAL CENTER | Age: 59
End: 2019-02-14
Attending: EMERGENCY MEDICINE
Payer: MEDICAID

## 2019-02-14 ENCOUNTER — APPOINTMENT (OUTPATIENT)
Dept: RADIOLOGY | Facility: MEDICAL CENTER | Age: 59
End: 2019-02-14
Attending: EMERGENCY MEDICINE
Payer: MEDICAID

## 2019-02-14 VITALS
OXYGEN SATURATION: 94 % | HEART RATE: 50 BPM | RESPIRATION RATE: 18 BRPM | DIASTOLIC BLOOD PRESSURE: 86 MMHG | SYSTOLIC BLOOD PRESSURE: 156 MMHG | TEMPERATURE: 97.7 F | WEIGHT: 191.58 LBS | HEIGHT: 67 IN | BODY MASS INDEX: 30.07 KG/M2

## 2019-02-14 DIAGNOSIS — E87.6 HYPOKALEMIA: ICD-10-CM

## 2019-02-14 LAB
ALBUMIN SERPL BCP-MCNC: 3.9 G/DL (ref 3.2–4.9)
ALBUMIN/GLOB SERPL: 1 G/DL
ALP SERPL-CCNC: 127 U/L (ref 30–99)
ALT SERPL-CCNC: 20 U/L (ref 2–50)
ANION GAP SERPL CALC-SCNC: 6 MMOL/L (ref 0–11.9)
AST SERPL-CCNC: 27 U/L (ref 12–45)
BASOPHILS # BLD AUTO: 1.2 % (ref 0–1.8)
BASOPHILS # BLD: 0.05 K/UL (ref 0–0.12)
BILIRUB SERPL-MCNC: 0.9 MG/DL (ref 0.1–1.5)
BUN SERPL-MCNC: 14 MG/DL (ref 8–22)
CALCIUM SERPL-MCNC: 9.7 MG/DL (ref 8.5–10.5)
CHLORIDE SERPL-SCNC: 106 MMOL/L (ref 96–112)
CO2 SERPL-SCNC: 23 MMOL/L (ref 20–33)
CREAT SERPL-MCNC: 0.73 MG/DL (ref 0.5–1.4)
EOSINOPHIL # BLD AUTO: 0.22 K/UL (ref 0–0.51)
EOSINOPHIL NFR BLD: 5.1 % (ref 0–6.9)
ERYTHROCYTE [DISTWIDTH] IN BLOOD BY AUTOMATED COUNT: 49.1 FL (ref 35.9–50)
GLOBULIN SER CALC-MCNC: 4.1 G/DL (ref 1.9–3.5)
GLUCOSE SERPL-MCNC: 107 MG/DL (ref 65–99)
HCT VFR BLD AUTO: 39.1 % (ref 37–47)
HGB BLD-MCNC: 12.9 G/DL (ref 12–16)
IMM GRANULOCYTES # BLD AUTO: 0 K/UL (ref 0–0.11)
IMM GRANULOCYTES NFR BLD AUTO: 0 % (ref 0–0.9)
LYMPHOCYTES # BLD AUTO: 1.39 K/UL (ref 1–4.8)
LYMPHOCYTES NFR BLD: 32.5 % (ref 22–41)
MCH RBC QN AUTO: 28.6 PG (ref 27–33)
MCHC RBC AUTO-ENTMCNC: 33 G/DL (ref 33.6–35)
MCV RBC AUTO: 86.7 FL (ref 81.4–97.8)
MONOCYTES # BLD AUTO: 0.33 K/UL (ref 0–0.85)
MONOCYTES NFR BLD AUTO: 7.7 % (ref 0–13.4)
NEUTROPHILS # BLD AUTO: 2.29 K/UL (ref 2–7.15)
NEUTROPHILS NFR BLD: 53.5 % (ref 44–72)
NRBC # BLD AUTO: 0 K/UL
NRBC BLD-RTO: 0 /100 WBC
PLATELET # BLD AUTO: 137 K/UL (ref 164–446)
PMV BLD AUTO: 10.9 FL (ref 9–12.9)
POTASSIUM SERPL-SCNC: 3.2 MMOL/L (ref 3.6–5.5)
PROT SERPL-MCNC: 8 G/DL (ref 6–8.2)
RBC # BLD AUTO: 4.51 M/UL (ref 4.2–5.4)
SODIUM SERPL-SCNC: 135 MMOL/L (ref 135–145)
TROPONIN I SERPL-MCNC: <0.01 NG/ML (ref 0–0.04)
WBC # BLD AUTO: 4.3 K/UL (ref 4.8–10.8)

## 2019-02-14 PROCEDURE — 700102 HCHG RX REV CODE 250 W/ 637 OVERRIDE(OP): Performed by: EMERGENCY MEDICINE

## 2019-02-14 PROCEDURE — 96365 THER/PROPH/DIAG IV INF INIT: CPT

## 2019-02-14 PROCEDURE — 80053 COMPREHEN METABOLIC PANEL: CPT

## 2019-02-14 PROCEDURE — 85025 COMPLETE CBC W/AUTO DIFF WBC: CPT

## 2019-02-14 PROCEDURE — 700111 HCHG RX REV CODE 636 W/ 250 OVERRIDE (IP): Performed by: EMERGENCY MEDICINE

## 2019-02-14 PROCEDURE — 84484 ASSAY OF TROPONIN QUANT: CPT

## 2019-02-14 PROCEDURE — 71046 X-RAY EXAM CHEST 2 VIEWS: CPT

## 2019-02-14 PROCEDURE — 99284 EMERGENCY DEPT VISIT MOD MDM: CPT

## 2019-02-14 PROCEDURE — A9270 NON-COVERED ITEM OR SERVICE: HCPCS | Performed by: EMERGENCY MEDICINE

## 2019-02-14 PROCEDURE — 71046 X-RAY EXAM CHEST 2 VIEWS: CPT | Performed by: RADIOLOGY

## 2019-02-14 RX ORDER — POTASSIUM CHLORIDE 20 MEQ/1
20 TABLET, EXTENDED RELEASE ORAL 2 TIMES DAILY
Qty: 10 TAB | Refills: 0 | Status: SHIPPED | OUTPATIENT
Start: 2019-02-14 | End: 2019-02-19

## 2019-02-14 RX ORDER — POTASSIUM CHLORIDE 7.45 MG/ML
10 INJECTION INTRAVENOUS ONCE
Status: COMPLETED | OUTPATIENT
Start: 2019-02-14 | End: 2019-02-14

## 2019-02-14 RX ORDER — POTASSIUM CHLORIDE 20 MEQ/1
40 TABLET, EXTENDED RELEASE ORAL ONCE
Status: COMPLETED | OUTPATIENT
Start: 2019-02-14 | End: 2019-02-14

## 2019-02-14 RX ADMIN — POTASSIUM CHLORIDE 10 MEQ: 7.46 INJECTION, SOLUTION INTRAVENOUS at 16:18

## 2019-02-14 RX ADMIN — POTASSIUM CHLORIDE 40 MEQ: 1500 TABLET, EXTENDED RELEASE ORAL at 16:18

## 2019-02-14 ASSESSMENT — LIFESTYLE VARIABLES: DO YOU DRINK ALCOHOL: NO

## 2019-02-14 NOTE — ED PROVIDER NOTES
ED Provider Note    Scribed for Cade Weiss M.D. by Laith Davila. 2/14/2019  3:47 PM    Primary care provider: Senia Baer M.D.  Means of arrival: Walk in  History obtained from: Patient  History limited by: None    CHIEF COMPLAINT  Chief Complaint   Patient presents with   • Sent by MD     Sent by [facility] for K 2.7       HPI  April Alicia is a 58 y.o. female who presents to the Emergency Department for evaluation of low potassium levels that were found earlier today at her clinic. Patient has been feeling weak since Friday, and on Saturday, had three episodes of dizziness. Patient denies any diarrhea or vomiting. She has had a cough, congestion and a sore throat. April is also complaining of chronic knee pain and hip pain from her arthritis.    REVIEW OF SYSTEMS  Pertinent positives include weakness, dizziness, cough, congestion, sore throat. Pertinent negatives include no diarrhea, vomiting.  All other systems reviewed and negative.    PAST MEDICAL HISTORY   has a past medical history of Arthritis; ASTHMA; Diabetes; Heart abnormality; Heart valve disease; Hypertension; Infection (9/4/2017); Liver disease; and Seizure disorder (HCC).    SURGICAL HISTORY   has a past surgical history that includes gyn surgery (1982); gyn surgery (2003); colonoscopy with clipping (10/28/2015); colonoscopy with sclerotherapy (10/28/2015); colonoscopy with tattooing (10/28/2015); and irrigation & debridement ortho (Right, 9/3/2017).    SOCIAL HISTORY  Social History   Substance Use Topics   • Smoking status: Former Smoker     Quit date: 12/29/2016   • Smokeless tobacco: Never Used      Comment: 10/2010   • Alcohol use No      History   Drug Use No       FAMILY HISTORY  History reviewed. No pertinent family history.    CURRENT MEDICATIONS  Home Medications     Reviewed by Laura Weiner R.N. (Registered Nurse) on 02/14/19 at 1401  Med List Status: Partial   Medication Last Dose Status   albuterol  "(VENTOLIN OR PROVENTIL) 108 (90 BASE) MCG/ACT Aero Soln inhalation aerosol  Active   cetirizine (ZYRTEC) 10 MG Tab 2/14/2019 Active   docusate sodium (COLACE) 100 MG Cap 2/14/2019 Active   Edoxaban Tosylate (SAVAYSA) 30 MG Tab  Active   felodipine (PLENDIL) 10 MG TABLET SR 24 HR 2/14/2019 Active   gabapentin (NEURONTIN) 300 MG Cap 2/14/2019 Active   losartan (COZAAR) 100 MG Tab PRN Active   magnesium oxide (MAG-OX) 400 MG Tab 2/14/2019 Active   metformin (GLUCOPHAGE) 850 MG Tab 2/14/2019 Active   oxyCODONE immediate release (ROXICODONE) 10 MG immediate release tablet 2/14/2019 Active   pantoprazole (PROTONIX) 40 MG Tablet Delayed Response  Active   potassium chloride ER (KLOR-CON) 10 MEQ tablet  Active   sulfamethoxazole-trimethoprim (BACTRIM DS) 800-160 MG tablet  Active                ALLERGIES  Allergies   Allergen Reactions   • Nkda [No Known Drug Allergy]        PHYSICAL EXAM  VITAL SIGNS: /86   Pulse 60   Temp 36.5 °C (97.7 °F) (Temporal)   Resp 18   Ht 1.702 m (5' 7\")   Wt 86.9 kg (191 lb 9.3 oz)   LMP 06/02/2008   SpO2 94%   Breastfeeding? No   BMI 30.01 kg/m²     Vital signs reviewed.  Constitutional:  Appears well-developed and well-nourished. No distress.   Head: Normocephalic.   Neck: Normal range of motion. Neck supple.   Cardiovascular: Normal rate, regular rhythm and normal heart sounds.    Pulmonary/Chest: Effort normal and breath sounds normal. No wheezes.   Abdominal: Soft. There is no tenderness. There is no rebound and no guarding.   Musculoskeletal: Exhibits no edema.   Lymphadenopathy: No cervical adenopathy.   Neurological: Patient is alert and oriented to person, place, and time. CNs II - XII intact. DTRs intact. Normal sensation and strength.  Skin: Skin is warm and dry.   Psychiatric: Patient has a normal mood and affect. Behavior is normal.     LABS  Results for orders placed or performed during the hospital encounter of 02/14/19   CBC WITH DIFFERENTIAL   Result Value Ref " Range    WBC 4.3 (L) 4.8 - 10.8 K/uL    RBC 4.51 4.20 - 5.40 M/uL    Hemoglobin 12.9 12.0 - 16.0 g/dL    Hematocrit 39.1 37.0 - 47.0 %    MCV 86.7 81.4 - 97.8 fL    MCH 28.6 27.0 - 33.0 pg    MCHC 33.0 (L) 33.6 - 35.0 g/dL    RDW 49.1 35.9 - 50.0 fL    Platelet Count 137 (L) 164 - 446 K/uL    MPV 10.9 9.0 - 12.9 fL    Neutrophils-Polys 53.50 44.00 - 72.00 %    Lymphocytes 32.50 22.00 - 41.00 %    Monocytes 7.70 0.00 - 13.40 %    Eosinophils 5.10 0.00 - 6.90 %    Basophils 1.20 0.00 - 1.80 %    Immature Granulocytes 0.00 0.00 - 0.90 %    Nucleated RBC 0.00 /100 WBC    Neutrophils (Absolute) 2.29 2.00 - 7.15 K/uL    Lymphs (Absolute) 1.39 1.00 - 4.80 K/uL    Monos (Absolute) 0.33 0.00 - 0.85 K/uL    Eos (Absolute) 0.22 0.00 - 0.51 K/uL    Baso (Absolute) 0.05 0.00 - 0.12 K/uL    Immature Granulocytes (abs) 0.00 0.00 - 0.11 K/uL    NRBC (Absolute) 0.00 K/uL   CMP   Result Value Ref Range    Sodium 135 135 - 145 mmol/L    Potassium 3.2 (L) 3.6 - 5.5 mmol/L    Chloride 106 96 - 112 mmol/L    Co2 23 20 - 33 mmol/L    Anion Gap 6.0 0.0 - 11.9    Glucose 107 (H) 65 - 99 mg/dL    Bun 14 8 - 22 mg/dL    Creatinine 0.73 0.50 - 1.40 mg/dL    Calcium 9.7 8.5 - 10.5 mg/dL    AST(SGOT) 27 12 - 45 U/L    ALT(SGPT) 20 2 - 50 U/L    Alkaline Phosphatase 127 (H) 30 - 99 U/L    Total Bilirubin 0.9 0.1 - 1.5 mg/dL    Albumin 3.9 3.2 - 4.9 g/dL    Total Protein 8.0 6.0 - 8.2 g/dL    Globulin 4.1 (H) 1.9 - 3.5 g/dL    A-G Ratio 1.0 g/dL   TROPONIN   Result Value Ref Range    Troponin I <0.01 0.00 - 0.04 ng/mL   ESTIMATED GFR   Result Value Ref Range    GFR If African American >60 >60 mL/min/1.73 m 2    GFR If Non African American >60 >60 mL/min/1.73 m 2      All labs reviewed by me.      RADIOLOGY  DX-CHEST-2 VIEWS   Final Result      No acute cardiopulmonary abnormality.        The radiologist's interpretation of all radiological studies have been reviewed by me.    COURSE & MEDICAL DECISION MAKING  Pertinent Labs & Imaging studies  reviewed. (See chart for details) The patient's [facility] and past medical  records were reviewed.  Patient has not had similar complaints.    3:47 PM - Patient seen and examined at bedside. Patient will be treated with potassium chloride. Ordered chest xray, CBC, CMP, troponin, estimated GFR to evaluate her symptoms. The differential diagnoses include but are not limited to: pneumonia vs viral syndrome vs metabolic abnormality.  Patient's lab work is really quite reassuring.  Her potassium is already up to 3.2.  She was given both a K rider as well as p.o. potassium here.  She will be discharged home on p.o. potassium and asked to follow-up with her regular physician within the week for repeat blood test.    Patient was discharged home in stable condition    FINAL IMPRESSION  1. Hypokalemia          Liath ARGUETA (Scribe), am scribing for, and in the presence of, Cade Weiss M.D..    Electronically signed by: Laith Davila (Elizabethe), 2/14/2019    Cade ARGUETA M.D. personally performed the services described in this documentation, as scribed by Laith Davila in my presence, and it is both accurate and complete. C.    The note accurately reflects work and decisions made by me.  Cade Weiss  2/14/2019  5:58 PM

## 2019-02-14 NOTE — ED TRIAGE NOTES
"Chief Complaint   Patient presents with   • Sent by MD     Sent by [facility] for K 2.7     /86   Pulse 60   Temp 36.5 °C (97.7 °F) (Temporal)   Resp 18   Ht 1.702 m (5' 7\")   Wt 86.9 kg (191 lb 9.3 oz)   LMP 06/02/2008   SpO2 94%   Breastfeeding? No   BMI 30.01 kg/m²     Pt ambulated into triage, complaints as above. VS as above, NAD, encouraged to return to the triage nurse or tech with any new complaints or symptoms.   "

## 2019-07-08 ENCOUNTER — HOSPITAL ENCOUNTER (OUTPATIENT)
Dept: RADIOLOGY | Facility: MEDICAL CENTER | Age: 59
End: 2019-07-08
Attending: NURSE PRACTITIONER
Payer: MEDICAID

## 2019-07-08 DIAGNOSIS — R10.2 ADNEXAL TENDERNESS, RIGHT: ICD-10-CM

## 2019-07-08 PROCEDURE — 76830 TRANSVAGINAL US NON-OB: CPT

## 2019-10-07 ENCOUNTER — APPOINTMENT (OUTPATIENT)
Dept: RADIOLOGY | Facility: MEDICAL CENTER | Age: 59
End: 2019-10-07
Attending: EMERGENCY MEDICINE
Payer: MEDICAID

## 2019-10-07 ENCOUNTER — HOSPITAL ENCOUNTER (EMERGENCY)
Facility: MEDICAL CENTER | Age: 59
End: 2019-10-07
Attending: EMERGENCY MEDICINE
Payer: MEDICAID

## 2019-10-07 VITALS
HEART RATE: 68 BPM | HEIGHT: 67 IN | TEMPERATURE: 98.6 F | RESPIRATION RATE: 16 BRPM | OXYGEN SATURATION: 97 % | DIASTOLIC BLOOD PRESSURE: 80 MMHG | SYSTOLIC BLOOD PRESSURE: 148 MMHG | BODY MASS INDEX: 29.03 KG/M2 | WEIGHT: 185 LBS

## 2019-10-07 DIAGNOSIS — S89.92XA INJURY OF LEFT KNEE, INITIAL ENCOUNTER: ICD-10-CM

## 2019-10-07 DIAGNOSIS — M25.462 KNEE EFFUSION, LEFT: ICD-10-CM

## 2019-10-07 DIAGNOSIS — Z76.5 DRUG-SEEKING BEHAVIOR: ICD-10-CM

## 2019-10-07 DIAGNOSIS — Z91.89 AT RISK FOR ABUSE OF OPIATES: ICD-10-CM

## 2019-10-07 PROCEDURE — 73564 X-RAY EXAM KNEE 4 OR MORE: CPT | Mod: LT

## 2019-10-07 PROCEDURE — 700102 HCHG RX REV CODE 250 W/ 637 OVERRIDE(OP): Performed by: EMERGENCY MEDICINE

## 2019-10-07 PROCEDURE — A9270 NON-COVERED ITEM OR SERVICE: HCPCS | Performed by: EMERGENCY MEDICINE

## 2019-10-07 PROCEDURE — 99284 EMERGENCY DEPT VISIT MOD MDM: CPT

## 2019-10-07 RX ORDER — HYDROCODONE BITARTRATE AND ACETAMINOPHEN 5; 325 MG/1; MG/1
1 TABLET ORAL ONCE
Status: DISCONTINUED | OUTPATIENT
Start: 2019-10-07 | End: 2019-10-07 | Stop reason: HOSPADM

## 2019-10-07 RX ORDER — OXYCODONE HYDROCHLORIDE 5 MG/1
5 TABLET ORAL ONCE
Status: COMPLETED | OUTPATIENT
Start: 2019-10-07 | End: 2019-10-07

## 2019-10-07 RX ADMIN — OXYCODONE HYDROCHLORIDE 5 MG: 5 TABLET ORAL at 13:06

## 2019-10-07 NOTE — ED TRIAGE NOTES
Chief Complaint   Patient presents with   • Knee Pain     L knee pain, scheduled for total knee 11/18     Pt states pain worse recently.  Unable to ambulate

## 2019-10-07 NOTE — ED PROVIDER NOTES
"ED Provider Note    Chief Complaint:   Right knee injury    HPI:  April Alicia is a 59 y.o. female who presents with chief complaint of left knee pain.  Symptoms began yesterday when she was walking and her knee suddenly gave out.  She fell striking the knee and causing and twisted in abnormal position.  Pain is localized to the anterior portion of the knee, exacerbated by flexing and extending the knee. She has associated swelling, and pain is alleviated by holding the knee in gentle flexion. She denies any other injuries, she did not strike her head, did not injure her hands. She denies any associated lacerations nor abrasions.    She is currently anticoagulated on apixaban for atrial fibrillation.    Review of Systems:  See HPI for pertinent positives and negatives.    Past Medical History:   has a past medical history of Arthritis, ASTHMA, Diabetes, Heart abnormality, Heart valve disease, Hypertension, Infection (2017), Liver disease, and Seizure disorder (HCC).    Social History:  Social History     Tobacco Use   • Smoking status: Former Smoker     Last attempt to quit: 2016     Years since quittin.7   • Smokeless tobacco: Never Used   • Tobacco comment: 10/2010   Substance and Sexual Activity   • Alcohol use: No   • Drug use: No   • Sexual activity: Not on file       Surgical History:   has a past surgical history that includes gyn surgery (); gyn surgery (); colonoscopy with clipping (10/28/2015); colonoscopy with sclerotherapy (10/28/2015); colonoscopy with tattooing (10/28/2015); and irrigation & debridement ortho (Right, 9/3/2017).    Current Medications:  Home Medications    **Home medications have not yet been reviewed for this encounter**         Allergies:  Allergies   Allergen Reactions   • Nkda [No Known Drug Allergy]        Physical Exam:  Vital Signs: /80   Pulse 68   Temp 37 °C (98.6 °F) (Temporal)   Resp 16   Ht 1.702 m (5' 7\")   Wt 83.9 kg (185 lb)   " "LMP 06/02/2008   SpO2 97%   BMI 28.98 kg/m²   Constitutional: Alert, no acute distress  HENT: Atraumatic  Neck: Normal range of motion  Cardiovascular: Left lower extremity warm, well perfused  Pulmonary: Normal work of breathing  Skin: Left knee with normal appearing overlying skin, no redness, no cellulitis, no evidence of abscess, no lacerations nor abrasions, no ecchymosis  Musculoskeletal: Left knee with visible swelling and palpable effusion, active and passive range of motion increases pain, ligamentous stability exam is limited by pain  Neurologic: left lower extremity with normal sensory and motor function    Medical records reviewed for continuity of care.  No recent visits for similar symptoms.  Patient is established with [facility] orthopedics.    Radiology:  DX-KNEE COMPLETE 4+ LEFT   Final Result      Marked tricompartment degenerative changes and large knee joint effusion.   No acute fracture identified.           ED Medications Administered:  Medications   oxyCODONE immediate-release (ROXICODONE) tablet 5 mg (5 mg Oral Given 10/7/19 1306)     MDM:  Plain film is negative for fracture or dislocation. She is scheduled for a left total knee replacement on 11/8/2019 with Dr. Faustin, orthopedic surgeon.  Call placed to [facility]. I discussed the case with Dr. Faustin' nurse who recommends crutches, WBAT and outpatient follow up. Ms. Alicia is instructed to contact the clinic in the morning for a follow up appointment in the next two weeks. She states she will be able to get around on crutches and is requesting discharge home. Return precautions were discussed with the patient, and provided in written form with the patient's discharge instructions.       She is also requesting a prescription for narcotic pain medications. She reports a \"bad liver\" that prevents her from taking tylenol, and states that anti-inflammatories interfere with her anticoagulation. Due to her acute injury, I " considered this prescription, however  database query reveals that she filled a prescription for #84 10 mg oxycodone tablets four days ago. I explained that it was illegal for her to request narcotic prescriptions without disclosing her current prescriptions, and informed her that we would not provide any further narcotics today.    Blood pressure today is greater than 120/80, patient is instructed to follow up with primary care provider for blood pressure recheck.    Disposition:  Discharge home in stable condition      Final Impression:  1. Injury of left knee, initial encounter    2. Knee effusion, left    3. Drug-seeking behavior    4. At risk for abuse of opiates        Electronically signed by: Sherry Farfan, 10/7/2019 9:21 PM

## 2019-10-07 NOTE — DISCHARGE INSTRUCTIONS
Please contact Dr. Faustin clinic today to schedule a follow-up appointment within the next 1 to 2 weeks.  Return to the emergency department if you develop any new or worsening symptoms, this includes worsening pain, swelling, numbness or tingling of the leg, fevers, or if you have any further concerns.  Additionally, please return to the emergency department for recheck if you are not able to schedule a follow-up appointment.

## 2019-12-09 ENCOUNTER — APPOINTMENT (OUTPATIENT)
Dept: PHYSICAL THERAPY | Facility: REHABILITATION | Age: 59
End: 2019-12-09
Attending: NURSE PRACTITIONER
Payer: MEDICAID

## 2020-01-27 ENCOUNTER — APPOINTMENT (OUTPATIENT)
Dept: RADIOLOGY | Facility: MEDICAL CENTER | Age: 60
End: 2020-01-27
Payer: MEDICAID

## 2020-01-27 ENCOUNTER — HOSPITAL ENCOUNTER (OUTPATIENT)
Facility: MEDICAL CENTER | Age: 60
End: 2020-01-30
Attending: EMERGENCY MEDICINE | Admitting: INTERNAL MEDICINE
Payer: MEDICAID

## 2020-01-27 ENCOUNTER — APPOINTMENT (OUTPATIENT)
Dept: RADIOLOGY | Facility: MEDICAL CENTER | Age: 60
End: 2020-01-27
Attending: INTERNAL MEDICINE
Payer: MEDICAID

## 2020-01-27 ENCOUNTER — APPOINTMENT (OUTPATIENT)
Dept: RADIOLOGY | Facility: MEDICAL CENTER | Age: 60
End: 2020-01-27
Attending: EMERGENCY MEDICINE
Payer: MEDICAID

## 2020-01-27 DIAGNOSIS — J18.9 PNEUMONIA OF LEFT LOWER LOBE DUE TO INFECTIOUS ORGANISM: ICD-10-CM

## 2020-01-27 DIAGNOSIS — J44.1 ACUTE EXACERBATION OF CHRONIC OBSTRUCTIVE PULMONARY DISEASE (COPD) (HCC): ICD-10-CM

## 2020-01-27 LAB
ALBUMIN SERPL BCP-MCNC: 3.5 G/DL (ref 3.2–4.9)
ALBUMIN/GLOB SERPL: 0.9 G/DL
ALP SERPL-CCNC: 87 U/L (ref 30–99)
ALT SERPL-CCNC: 12 U/L (ref 2–50)
ANION GAP SERPL CALC-SCNC: 9 MMOL/L (ref 0–11.9)
AST SERPL-CCNC: 18 U/L (ref 12–45)
BASOPHILS # BLD AUTO: 0.7 % (ref 0–1.8)
BASOPHILS # BLD: 0.03 K/UL (ref 0–0.12)
BILIRUB SERPL-MCNC: 0.7 MG/DL (ref 0.1–1.5)
BLOOD CULTURE HOLD CXBCH: NORMAL
BUN SERPL-MCNC: 8 MG/DL (ref 8–22)
CALCIUM SERPL-MCNC: 9.3 MG/DL (ref 8.5–10.5)
CHLORIDE SERPL-SCNC: 106 MMOL/L (ref 96–112)
CO2 SERPL-SCNC: 23 MMOL/L (ref 20–33)
CREAT SERPL-MCNC: 0.73 MG/DL (ref 0.5–1.4)
EOSINOPHIL # BLD AUTO: 0.07 K/UL (ref 0–0.51)
EOSINOPHIL NFR BLD: 1.7 % (ref 0–6.9)
ERYTHROCYTE [DISTWIDTH] IN BLOOD BY AUTOMATED COUNT: 43.8 FL (ref 35.9–50)
GLOBULIN SER CALC-MCNC: 3.8 G/DL (ref 1.9–3.5)
GLUCOSE BLD-MCNC: 152 MG/DL (ref 65–99)
GLUCOSE BLD-MCNC: 173 MG/DL (ref 65–99)
GLUCOSE SERPL-MCNC: 150 MG/DL (ref 65–99)
HCT VFR BLD AUTO: 36.9 % (ref 37–47)
HGB BLD-MCNC: 12.2 G/DL (ref 12–16)
IMM GRANULOCYTES # BLD AUTO: 0.01 K/UL (ref 0–0.11)
IMM GRANULOCYTES NFR BLD AUTO: 0.2 % (ref 0–0.9)
LYMPHOCYTES # BLD AUTO: 0.8 K/UL (ref 1–4.8)
LYMPHOCYTES NFR BLD: 19.4 % (ref 22–41)
MCH RBC QN AUTO: 28.8 PG (ref 27–33)
MCHC RBC AUTO-ENTMCNC: 33.1 G/DL (ref 33.6–35)
MCV RBC AUTO: 87 FL (ref 81.4–97.8)
MONOCYTES # BLD AUTO: 0.35 K/UL (ref 0–0.85)
MONOCYTES NFR BLD AUTO: 8.5 % (ref 0–13.4)
NEUTROPHILS # BLD AUTO: 2.86 K/UL (ref 2–7.15)
NEUTROPHILS NFR BLD: 69.5 % (ref 44–72)
NRBC # BLD AUTO: 0 K/UL
NRBC BLD-RTO: 0 /100 WBC
NT-PROBNP SERPL IA-MCNC: 658 PG/ML (ref 0–125)
PLATELET # BLD AUTO: 106 K/UL (ref 164–446)
PMV BLD AUTO: 10.4 FL (ref 9–12.9)
POTASSIUM SERPL-SCNC: 3.4 MMOL/L (ref 3.6–5.5)
PROCALCITONIN SERPL-MCNC: <0.05 NG/ML
PROT SERPL-MCNC: 7.3 G/DL (ref 6–8.2)
RBC # BLD AUTO: 4.24 M/UL (ref 4.2–5.4)
SODIUM SERPL-SCNC: 138 MMOL/L (ref 135–145)
TROPONIN T SERPL-MCNC: 7 NG/L (ref 6–19)
WBC # BLD AUTO: 4.1 K/UL (ref 4.8–10.8)

## 2020-01-27 PROCEDURE — 96372 THER/PROPH/DIAG INJ SC/IM: CPT | Mod: XU

## 2020-01-27 PROCEDURE — 700111 HCHG RX REV CODE 636 W/ 250 OVERRIDE (IP): Performed by: INTERNAL MEDICINE

## 2020-01-27 PROCEDURE — 99285 EMERGENCY DEPT VISIT HI MDM: CPT

## 2020-01-27 PROCEDURE — 700111 HCHG RX REV CODE 636 W/ 250 OVERRIDE (IP): Performed by: EMERGENCY MEDICINE

## 2020-01-27 PROCEDURE — 96365 THER/PROPH/DIAG IV INF INIT: CPT

## 2020-01-27 PROCEDURE — 85025 COMPLETE CBC W/AUTO DIFF WBC: CPT

## 2020-01-27 PROCEDURE — 84484 ASSAY OF TROPONIN QUANT: CPT

## 2020-01-27 PROCEDURE — 80053 COMPREHEN METABOLIC PANEL: CPT

## 2020-01-27 PROCEDURE — 82962 GLUCOSE BLOOD TEST: CPT | Mod: 91

## 2020-01-27 PROCEDURE — G0378 HOSPITAL OBSERVATION PER HR: HCPCS

## 2020-01-27 PROCEDURE — 96375 TX/PRO/DX INJ NEW DRUG ADDON: CPT

## 2020-01-27 PROCEDURE — 99220 PR INITIAL OBSERVATION CARE,LEVL III: CPT | Performed by: INTERNAL MEDICINE

## 2020-01-27 PROCEDURE — 700105 HCHG RX REV CODE 258: Performed by: EMERGENCY MEDICINE

## 2020-01-27 PROCEDURE — 700102 HCHG RX REV CODE 250 W/ 637 OVERRIDE(OP): Performed by: INTERNAL MEDICINE

## 2020-01-27 PROCEDURE — 94640 AIRWAY INHALATION TREATMENT: CPT

## 2020-01-27 PROCEDURE — 700102 HCHG RX REV CODE 250 W/ 637 OVERRIDE(OP): Performed by: EMERGENCY MEDICINE

## 2020-01-27 PROCEDURE — A9270 NON-COVERED ITEM OR SERVICE: HCPCS | Performed by: EMERGENCY MEDICINE

## 2020-01-27 PROCEDURE — 700101 HCHG RX REV CODE 250: Performed by: INTERNAL MEDICINE

## 2020-01-27 PROCEDURE — A9270 NON-COVERED ITEM OR SERVICE: HCPCS | Performed by: INTERNAL MEDICINE

## 2020-01-27 PROCEDURE — 94760 N-INVAS EAR/PLS OXIMETRY 1: CPT

## 2020-01-27 PROCEDURE — 83880 ASSAY OF NATRIURETIC PEPTIDE: CPT

## 2020-01-27 PROCEDURE — 71045 X-RAY EXAM CHEST 1 VIEW: CPT

## 2020-01-27 PROCEDURE — 84145 PROCALCITONIN (PCT): CPT

## 2020-01-27 RX ORDER — GUAIFENESIN 600 MG/1
1200 TABLET, EXTENDED RELEASE ORAL 2 TIMES DAILY
Status: DISCONTINUED | OUTPATIENT
Start: 2020-01-27 | End: 2020-01-30 | Stop reason: HOSPADM

## 2020-01-27 RX ORDER — PROMETHAZINE HYDROCHLORIDE 25 MG/1
12.5-25 TABLET ORAL EVERY 4 HOURS PRN
Status: DISCONTINUED | OUTPATIENT
Start: 2020-01-27 | End: 2020-01-30 | Stop reason: HOSPADM

## 2020-01-27 RX ORDER — POLYETHYLENE GLYCOL 3350 17 G/17G
1 POWDER, FOR SOLUTION ORAL
Status: DISCONTINUED | OUTPATIENT
Start: 2020-01-27 | End: 2020-01-30 | Stop reason: HOSPADM

## 2020-01-27 RX ORDER — PROCHLORPERAZINE EDISYLATE 5 MG/ML
5-10 INJECTION INTRAMUSCULAR; INTRAVENOUS EVERY 4 HOURS PRN
Status: DISCONTINUED | OUTPATIENT
Start: 2020-01-27 | End: 2020-01-30 | Stop reason: HOSPADM

## 2020-01-27 RX ORDER — ACETYLCYSTEINE 200 MG/ML
3 SOLUTION ORAL; RESPIRATORY (INHALATION) EVERY 4 HOURS
Status: DISCONTINUED | OUTPATIENT
Start: 2020-01-27 | End: 2020-01-28 | Stop reason: ALTCHOICE

## 2020-01-27 RX ORDER — AZITHROMYCIN 250 MG/1
500 TABLET, FILM COATED ORAL DAILY
Status: COMPLETED | OUTPATIENT
Start: 2020-01-28 | End: 2020-01-30

## 2020-01-27 RX ORDER — BISACODYL 10 MG
10 SUPPOSITORY, RECTAL RECTAL
Status: DISCONTINUED | OUTPATIENT
Start: 2020-01-27 | End: 2020-01-30 | Stop reason: HOSPADM

## 2020-01-27 RX ORDER — LOSARTAN POTASSIUM 50 MG/1
100 TABLET ORAL DAILY
Status: DISCONTINUED | OUTPATIENT
Start: 2020-01-28 | End: 2020-01-30 | Stop reason: HOSPADM

## 2020-01-27 RX ORDER — PROMETHAZINE HYDROCHLORIDE 25 MG/1
12.5-25 SUPPOSITORY RECTAL EVERY 4 HOURS PRN
Status: DISCONTINUED | OUTPATIENT
Start: 2020-01-27 | End: 2020-01-30 | Stop reason: HOSPADM

## 2020-01-27 RX ORDER — OXYCODONE HYDROCHLORIDE 5 MG/1
5 TABLET ORAL EVERY 4 HOURS PRN
COMMUNITY
End: 2022-02-24

## 2020-01-27 RX ORDER — OXYCODONE HYDROCHLORIDE 5 MG/1
5 TABLET ORAL EVERY 4 HOURS PRN
Status: DISCONTINUED | OUTPATIENT
Start: 2020-01-27 | End: 2020-01-30 | Stop reason: HOSPADM

## 2020-01-27 RX ORDER — DOXYCYCLINE 100 MG/1
100 TABLET ORAL ONCE
Status: COMPLETED | OUTPATIENT
Start: 2020-01-27 | End: 2020-01-27

## 2020-01-27 RX ORDER — AMOXICILLIN 250 MG
2 CAPSULE ORAL 2 TIMES DAILY
Status: DISCONTINUED | OUTPATIENT
Start: 2020-01-27 | End: 2020-01-30 | Stop reason: HOSPADM

## 2020-01-27 RX ORDER — OXYCODONE AND ACETAMINOPHEN 10; 325 MG/1; MG/1
1-2 TABLET ORAL EVERY 4 HOURS PRN
Status: ON HOLD | COMMUNITY
Start: 2019-11-13 | End: 2020-01-30

## 2020-01-27 RX ORDER — GABAPENTIN 300 MG/1
300 CAPSULE ORAL 2 TIMES DAILY
Status: DISCONTINUED | OUTPATIENT
Start: 2020-01-27 | End: 2020-01-30 | Stop reason: HOSPADM

## 2020-01-27 RX ORDER — IPRATROPIUM BROMIDE AND ALBUTEROL SULFATE 2.5; .5 MG/3ML; MG/3ML
3 SOLUTION RESPIRATORY (INHALATION)
Status: DISCONTINUED | OUTPATIENT
Start: 2020-01-27 | End: 2020-01-28

## 2020-01-27 RX ORDER — ONDANSETRON 4 MG/1
4 TABLET, ORALLY DISINTEGRATING ORAL EVERY 4 HOURS PRN
Status: DISCONTINUED | OUTPATIENT
Start: 2020-01-27 | End: 2020-01-30 | Stop reason: HOSPADM

## 2020-01-27 RX ORDER — ALBUTEROL SULFATE 90 UG/1
2 AEROSOL, METERED RESPIRATORY (INHALATION)
Status: DISCONTINUED | OUTPATIENT
Start: 2020-01-27 | End: 2020-01-30 | Stop reason: HOSPADM

## 2020-01-27 RX ORDER — METHYLPREDNISOLONE SODIUM SUCCINATE 40 MG/ML
40 INJECTION, POWDER, LYOPHILIZED, FOR SOLUTION INTRAMUSCULAR; INTRAVENOUS EVERY 6 HOURS
Status: DISCONTINUED | OUTPATIENT
Start: 2020-01-27 | End: 2020-01-28 | Stop reason: DRUGHIGH

## 2020-01-27 RX ORDER — AMLODIPINE BESYLATE 5 MG/1
5 TABLET ORAL DAILY
Status: DISCONTINUED | OUTPATIENT
Start: 2020-01-28 | End: 2020-01-30 | Stop reason: HOSPADM

## 2020-01-27 RX ORDER — ONDANSETRON 2 MG/ML
4 INJECTION INTRAMUSCULAR; INTRAVENOUS EVERY 4 HOURS PRN
Status: DISCONTINUED | OUTPATIENT
Start: 2020-01-27 | End: 2020-01-30 | Stop reason: HOSPADM

## 2020-01-27 RX ORDER — AMLODIPINE BESYLATE 5 MG/1
5 TABLET ORAL DAILY
COMMUNITY
End: 2020-07-09

## 2020-01-27 RX ORDER — ACETAMINOPHEN 325 MG/1
650 TABLET ORAL EVERY 6 HOURS PRN
Status: DISCONTINUED | OUTPATIENT
Start: 2020-01-27 | End: 2020-01-30 | Stop reason: HOSPADM

## 2020-01-27 RX ORDER — PREDNISONE 20 MG/1
60 TABLET ORAL DAILY
Status: DISCONTINUED | OUTPATIENT
Start: 2020-01-27 | End: 2020-01-28

## 2020-01-27 RX ADMIN — INSULIN HUMAN 1 UNITS: 100 INJECTION, SOLUTION PARENTERAL at 21:31

## 2020-01-27 RX ADMIN — METHYLPREDNISOLONE SODIUM SUCCINATE 40 MG: 40 INJECTION, POWDER, FOR SOLUTION INTRAMUSCULAR; INTRAVENOUS at 20:10

## 2020-01-27 RX ADMIN — PREDNISONE 60 MG: 20 TABLET ORAL at 17:06

## 2020-01-27 RX ADMIN — CEFTRIAXONE SODIUM 2 G: 2 INJECTION, POWDER, FOR SOLUTION INTRAMUSCULAR; INTRAVENOUS at 17:13

## 2020-01-27 RX ADMIN — GUAIFENESIN 1200 MG: 600 TABLET, EXTENDED RELEASE ORAL at 20:16

## 2020-01-27 RX ADMIN — DOXYCYCLINE 100 MG: 100 TABLET, FILM COATED ORAL at 17:06

## 2020-01-27 RX ADMIN — APIXABAN 5 MG: 5 TABLET, FILM COATED ORAL at 22:50

## 2020-01-27 RX ADMIN — ACETYLCYSTEINE 3 ML: 200 INHALANT RESPIRATORY (INHALATION) at 23:20

## 2020-01-27 RX ADMIN — SENNOSIDES AND DOCUSATE SODIUM 2 TABLET: 8.6; 5 TABLET ORAL at 20:16

## 2020-01-27 RX ADMIN — GABAPENTIN 300 MG: 300 CAPSULE ORAL at 20:15

## 2020-01-27 RX ADMIN — IPRATROPIUM BROMIDE AND ALBUTEROL SULFATE 3 ML: .5; 3 SOLUTION RESPIRATORY (INHALATION) at 23:20

## 2020-01-27 RX ADMIN — OXYCODONE HYDROCHLORIDE 5 MG: 5 TABLET ORAL at 20:15

## 2020-01-27 ASSESSMENT — COGNITIVE AND FUNCTIONAL STATUS - GENERAL
MOBILITY SCORE: 24
SUGGESTED CMS G CODE MODIFIER DAILY ACTIVITY: CH
DAILY ACTIVITIY SCORE: 24
SUGGESTED CMS G CODE MODIFIER MOBILITY: CH
CLIMB 3 TO 5 STEPS WITH RAILING: A LITTLE
SUGGESTED CMS G CODE MODIFIER DAILY ACTIVITY: CH
DAILY ACTIVITIY SCORE: 24

## 2020-01-27 ASSESSMENT — LIFESTYLE VARIABLES
AVERAGE NUMBER OF DAYS PER WEEK YOU HAVE A DRINK CONTAINING ALCOHOL: 0
EVER HAD A DRINK FIRST THING IN THE MORNING TO STEADY YOUR NERVES TO GET RID OF A HANGOVER: NO
TOTAL SCORE: 0
ON A TYPICAL DAY WHEN YOU DRINK ALCOHOL HOW MANY DRINKS DO YOU HAVE: 0
DOES PATIENT WANT TO STOP DRINKING: NO
EVER_SMOKED: YES
EVER FELT BAD OR GUILTY ABOUT YOUR DRINKING: NO
ALCOHOL_USE: NO
DOES PATIENT WANT TO STOP DRINKING: YES
HAVE PEOPLE ANNOYED YOU BY CRITICIZING YOUR DRINKING: NO
TOTAL SCORE: 0
HAVE PEOPLE ANNOYED YOU BY CRITICIZING YOUR DRINKING: NO
EVER FELT BAD OR GUILTY ABOUT YOUR DRINKING: NO
ALCOHOL_USE: NO
HAVE YOU EVER FELT YOU SHOULD CUT DOWN ON YOUR DRINKING: NO
CONSUMPTION TOTAL: INCOMPLETE
DOES PATIENT WANT TO TALK TO SOMEONE ABOUT QUITTING: NO
TOTAL SCORE: 0
TOTAL SCORE: 0
CONSUMPTION TOTAL: NEGATIVE
HOW MANY TIMES IN THE PAST YEAR HAVE YOU HAD 5 OR MORE DRINKS IN A DAY: 0
HAVE YOU EVER FELT YOU SHOULD CUT DOWN ON YOUR DRINKING: NO
TOTAL SCORE: 0
EVER HAD A DRINK FIRST THING IN THE MORNING TO STEADY YOUR NERVES TO GET RID OF A HANGOVER: NO
TOTAL SCORE: 0

## 2020-01-27 ASSESSMENT — ENCOUNTER SYMPTOMS
ORTHOPNEA: 0
HEARTBURN: 0
WEIGHT LOSS: 0
SPUTUM PRODUCTION: 1
FOCAL WEAKNESS: 0
BLURRED VISION: 0
CHILLS: 1
VOMITING: 0
NERVOUS/ANXIOUS: 0
BACK PAIN: 0
MYALGIAS: 0
PALPITATIONS: 0
DIZZINESS: 0
SHORTNESS OF BREATH: 1
EYE PAIN: 0
COUGH: 1
DIARRHEA: 0
HEADACHES: 0
DEPRESSION: 0
FEVER: 1
INSOMNIA: 0
NECK PAIN: 0
EYE REDNESS: 0
SEIZURES: 0
STRIDOR: 0
NAUSEA: 0
EYE DISCHARGE: 0
ABDOMINAL PAIN: 0

## 2020-01-27 ASSESSMENT — COPD QUESTIONNAIRES
DO YOU EVER COUGH UP ANY MUCUS OR PHLEGM?: YES, EVERY DAY
COPD SCREENING SCORE: 8
HAVE YOU SMOKED AT LEAST 100 CIGARETTES IN YOUR ENTIRE LIFE: YES
DURING THE PAST 4 WEEKS HOW MUCH DID YOU FEEL SHORT OF BREATH: MOST  OR ALL OF THE TIME

## 2020-01-27 ASSESSMENT — PATIENT HEALTH QUESTIONNAIRE - PHQ9
1. LITTLE INTEREST OR PLEASURE IN DOING THINGS: NOT AT ALL
SUM OF ALL RESPONSES TO PHQ9 QUESTIONS 1 AND 2: 0
2. FEELING DOWN, DEPRESSED, IRRITABLE, OR HOPELESS: NOT AT ALL

## 2020-01-27 NOTE — ED NOTES
Rechecked vitals. VSS. Tech apologized for wait, patient back in ER Lobby with no new needs. Patient instructed to notify Triage RN or ER Tech of any changes.

## 2020-01-27 NOTE — ED TRIAGE NOTES
Chief Complaint   Patient presents with   • Cough     x1 week   • Sent by MD     Seen at [facility] and sent for pneumonia after 2 view CXR.      BIB [facility] to triage for above. VSS. Explained triage process, to waiting room. Asked to inform RN if questions or concerns arise.

## 2020-01-28 ENCOUNTER — PATIENT OUTREACH (OUTPATIENT)
Dept: HEALTH INFORMATION MANAGEMENT | Facility: OTHER | Age: 60
End: 2020-01-28

## 2020-01-28 PROBLEM — T84.59XA INFECTED PROSTHETIC KNEE JOINT (HCC): Status: RESOLVED | Noted: 2017-09-04 | Resolved: 2020-01-28

## 2020-01-28 PROBLEM — E11.65 TYPE 2 DIABETES MELLITUS WITH HYPERGLYCEMIA (HCC): Status: ACTIVE | Noted: 2017-09-04

## 2020-01-28 PROBLEM — D64.9 NORMOCYTIC ANEMIA: Status: RESOLVED | Noted: 2017-09-04 | Resolved: 2020-01-28

## 2020-01-28 PROBLEM — Z96.659 INFECTED PROSTHETIC KNEE JOINT (HCC): Status: RESOLVED | Noted: 2017-09-04 | Resolved: 2020-01-28

## 2020-01-28 PROBLEM — D69.6 THROMBOCYTOPENIA (HCC): Status: ACTIVE | Noted: 2020-01-28

## 2020-01-28 PROBLEM — D72.819 LEUKOPENIA: Status: ACTIVE | Noted: 2020-01-28

## 2020-01-28 PROBLEM — E11.65 CONTROLLED TYPE 2 DIABETES MELLITUS WITH HYPERGLYCEMIA, WITHOUT LONG-TERM CURRENT USE OF INSULIN (HCC): Chronic | Status: ACTIVE | Noted: 2020-01-28

## 2020-01-28 PROBLEM — B99.9 INFECTION: Status: RESOLVED | Noted: 2017-09-04 | Resolved: 2020-01-28

## 2020-01-28 PROBLEM — I10 HYPERTENSION: Chronic | Status: ACTIVE | Noted: 2020-01-28

## 2020-01-28 PROBLEM — D68.318 ACQUIRED CIRCULATING ANTICOAGULANTS (HCC): Status: ACTIVE | Noted: 2020-01-28

## 2020-01-28 LAB
ALBUMIN SERPL BCP-MCNC: 3.3 G/DL (ref 3.2–4.9)
ALBUMIN/GLOB SERPL: 0.9 G/DL
ALP SERPL-CCNC: 92 U/L (ref 30–99)
ALT SERPL-CCNC: 12 U/L (ref 2–50)
ANION GAP SERPL CALC-SCNC: 10 MMOL/L (ref 0–11.9)
AST SERPL-CCNC: 19 U/L (ref 12–45)
BASOPHILS # BLD AUTO: 0 % (ref 0–1.8)
BASOPHILS # BLD: 0 K/UL (ref 0–0.12)
BILIRUB SERPL-MCNC: 0.5 MG/DL (ref 0.1–1.5)
BUN SERPL-MCNC: 12 MG/DL (ref 8–22)
CALCIUM SERPL-MCNC: 8.9 MG/DL (ref 8.5–10.5)
CHLORIDE SERPL-SCNC: 109 MMOL/L (ref 96–112)
CO2 SERPL-SCNC: 23 MMOL/L (ref 20–33)
CREAT SERPL-MCNC: 0.78 MG/DL (ref 0.5–1.4)
EKG IMPRESSION: NORMAL
EOSINOPHIL # BLD AUTO: 0 K/UL (ref 0–0.51)
EOSINOPHIL NFR BLD: 0 % (ref 0–6.9)
ERYTHROCYTE [DISTWIDTH] IN BLOOD BY AUTOMATED COUNT: 44.2 FL (ref 35.9–50)
ERYTHROCYTE [DISTWIDTH] IN BLOOD BY AUTOMATED COUNT: 44.6 FL (ref 35.9–50)
GLOBULIN SER CALC-MCNC: 3.6 G/DL (ref 1.9–3.5)
GLUCOSE BLD-MCNC: 152 MG/DL (ref 65–99)
GLUCOSE BLD-MCNC: 159 MG/DL (ref 65–99)
GLUCOSE BLD-MCNC: 201 MG/DL (ref 65–99)
GLUCOSE BLD-MCNC: 217 MG/DL (ref 65–99)
GLUCOSE SERPL-MCNC: 262 MG/DL (ref 65–99)
HCT VFR BLD AUTO: 34.3 % (ref 37–47)
HCT VFR BLD AUTO: 36.4 % (ref 37–47)
HGB BLD-MCNC: 11.3 G/DL (ref 12–16)
HGB BLD-MCNC: 12.3 G/DL (ref 12–16)
IMM GRANULOCYTES # BLD AUTO: 0.02 K/UL (ref 0–0.11)
IMM GRANULOCYTES NFR BLD AUTO: 0.5 % (ref 0–0.9)
LYMPHOCYTES # BLD AUTO: 0.36 K/UL (ref 1–4.8)
LYMPHOCYTES NFR BLD: 9.7 % (ref 22–41)
MCH RBC QN AUTO: 28.8 PG (ref 27–33)
MCH RBC QN AUTO: 29.3 PG (ref 27–33)
MCHC RBC AUTO-ENTMCNC: 32.9 G/DL (ref 33.6–35)
MCHC RBC AUTO-ENTMCNC: 33.8 G/DL (ref 33.6–35)
MCV RBC AUTO: 86.7 FL (ref 81.4–97.8)
MCV RBC AUTO: 87.3 FL (ref 81.4–97.8)
MONOCYTES # BLD AUTO: 0.04 K/UL (ref 0–0.85)
MONOCYTES NFR BLD AUTO: 1.1 % (ref 0–13.4)
NEUTROPHILS # BLD AUTO: 3.28 K/UL (ref 2–7.15)
NEUTROPHILS NFR BLD: 88.7 % (ref 44–72)
NRBC # BLD AUTO: 0 K/UL
NRBC BLD-RTO: 0 /100 WBC
PLATELET # BLD AUTO: 79 K/UL (ref 164–446)
PLATELET # BLD AUTO: 91 K/UL (ref 164–446)
PMV BLD AUTO: 10.8 FL (ref 9–12.9)
PMV BLD AUTO: 11 FL (ref 9–12.9)
POTASSIUM SERPL-SCNC: 3.6 MMOL/L (ref 3.6–5.5)
PROT SERPL-MCNC: 6.9 G/DL (ref 6–8.2)
RBC # BLD AUTO: 3.93 M/UL (ref 4.2–5.4)
RBC # BLD AUTO: 4.2 M/UL (ref 4.2–5.4)
SODIUM SERPL-SCNC: 142 MMOL/L (ref 135–145)
WBC # BLD AUTO: 2.1 K/UL (ref 4.8–10.8)
WBC # BLD AUTO: 3.7 K/UL (ref 4.8–10.8)

## 2020-01-28 PROCEDURE — 94667 MNPJ CHEST WALL 1ST: CPT

## 2020-01-28 PROCEDURE — 94664 DEMO&/EVAL PT USE INHALER: CPT

## 2020-01-28 PROCEDURE — 82962 GLUCOSE BLOOD TEST: CPT

## 2020-01-28 PROCEDURE — 700111 HCHG RX REV CODE 636 W/ 250 OVERRIDE (IP): Performed by: EMERGENCY MEDICINE

## 2020-01-28 PROCEDURE — 700101 HCHG RX REV CODE 250: Performed by: INTERNAL MEDICINE

## 2020-01-28 PROCEDURE — 700111 HCHG RX REV CODE 636 W/ 250 OVERRIDE (IP): Performed by: INTERNAL MEDICINE

## 2020-01-28 PROCEDURE — 93005 ELECTROCARDIOGRAM TRACING: CPT | Performed by: NURSE PRACTITIONER

## 2020-01-28 PROCEDURE — 80053 COMPREHEN METABOLIC PANEL: CPT

## 2020-01-28 PROCEDURE — 700102 HCHG RX REV CODE 250 W/ 637 OVERRIDE(OP): Performed by: INTERNAL MEDICINE

## 2020-01-28 PROCEDURE — 700102 HCHG RX REV CODE 250 W/ 637 OVERRIDE(OP): Performed by: NURSE PRACTITIONER

## 2020-01-28 PROCEDURE — 94669 MECHANICAL CHEST WALL OSCILL: CPT

## 2020-01-28 PROCEDURE — 93010 ELECTROCARDIOGRAM REPORT: CPT | Performed by: INTERNAL MEDICINE

## 2020-01-28 PROCEDURE — 96372 THER/PROPH/DIAG INJ SC/IM: CPT

## 2020-01-28 PROCEDURE — G0378 HOSPITAL OBSERVATION PER HR: HCPCS

## 2020-01-28 PROCEDURE — 36415 COLL VENOUS BLD VENIPUNCTURE: CPT

## 2020-01-28 PROCEDURE — A9270 NON-COVERED ITEM OR SERVICE: HCPCS | Performed by: INTERNAL MEDICINE

## 2020-01-28 PROCEDURE — 94640 AIRWAY INHALATION TREATMENT: CPT

## 2020-01-28 PROCEDURE — A9270 NON-COVERED ITEM OR SERVICE: HCPCS | Performed by: NURSE PRACTITIONER

## 2020-01-28 PROCEDURE — 85025 COMPLETE CBC W/AUTO DIFF WBC: CPT

## 2020-01-28 PROCEDURE — 99226 PR SUBSEQUENT OBSERVATION CARE,LEVEL III: CPT | Performed by: HOSPITALIST

## 2020-01-28 PROCEDURE — 85027 COMPLETE CBC AUTOMATED: CPT | Mod: XU

## 2020-01-28 PROCEDURE — 94668 MNPJ CHEST WALL SBSQ: CPT

## 2020-01-28 PROCEDURE — 96376 TX/PRO/DX INJ SAME DRUG ADON: CPT

## 2020-01-28 PROCEDURE — 94760 N-INVAS EAR/PLS OXIMETRY 1: CPT

## 2020-01-28 RX ORDER — IPRATROPIUM BROMIDE AND ALBUTEROL SULFATE 2.5; .5 MG/3ML; MG/3ML
3 SOLUTION RESPIRATORY (INHALATION)
Status: DISCONTINUED | OUTPATIENT
Start: 2020-01-29 | End: 2020-01-29

## 2020-01-28 RX ORDER — IPRATROPIUM BROMIDE AND ALBUTEROL SULFATE 2.5; .5 MG/3ML; MG/3ML
3 SOLUTION RESPIRATORY (INHALATION)
Status: DISCONTINUED | OUTPATIENT
Start: 2020-01-28 | End: 2020-01-30 | Stop reason: HOSPADM

## 2020-01-28 RX ORDER — PREDNISONE 20 MG/1
40 TABLET ORAL DAILY
Status: DISCONTINUED | OUTPATIENT
Start: 2020-01-29 | End: 2020-01-30 | Stop reason: HOSPADM

## 2020-01-28 RX ADMIN — IPRATROPIUM BROMIDE AND ALBUTEROL SULFATE 3 ML: .5; 3 SOLUTION RESPIRATORY (INHALATION) at 11:35

## 2020-01-28 RX ADMIN — AZITHROMYCIN 500 MG: 250 TABLET, FILM COATED ORAL at 06:22

## 2020-01-28 RX ADMIN — INSULIN HUMAN 2 UNITS: 100 INJECTION, SOLUTION PARENTERAL at 12:15

## 2020-01-28 RX ADMIN — GABAPENTIN 300 MG: 300 CAPSULE ORAL at 18:12

## 2020-01-28 RX ADMIN — IPRATROPIUM BROMIDE AND ALBUTEROL SULFATE 3 ML: .5; 3 SOLUTION RESPIRATORY (INHALATION) at 07:31

## 2020-01-28 RX ADMIN — ACETYLCYSTEINE 3 ML: 200 INHALANT RESPIRATORY (INHALATION) at 03:16

## 2020-01-28 RX ADMIN — ACETYLCYSTEINE 3 ML: 200 INHALANT RESPIRATORY (INHALATION) at 19:49

## 2020-01-28 RX ADMIN — APIXABAN 5 MG: 5 TABLET, FILM COATED ORAL at 18:12

## 2020-01-28 RX ADMIN — GUAIFENESIN 1200 MG: 600 TABLET, EXTENDED RELEASE ORAL at 06:20

## 2020-01-28 RX ADMIN — INSULIN HUMAN 2 UNITS: 100 INJECTION, SOLUTION PARENTERAL at 06:28

## 2020-01-28 RX ADMIN — IPRATROPIUM BROMIDE AND ALBUTEROL SULFATE 3 ML: .5; 3 SOLUTION RESPIRATORY (INHALATION) at 13:55

## 2020-01-28 RX ADMIN — INSULIN HUMAN 1 UNITS: 100 INJECTION, SOLUTION PARENTERAL at 21:27

## 2020-01-28 RX ADMIN — AMLODIPINE BESYLATE 5 MG: 5 TABLET ORAL at 06:22

## 2020-01-28 RX ADMIN — ACETYLCYSTEINE 3 ML: 200 INHALANT RESPIRATORY (INHALATION) at 11:35

## 2020-01-28 RX ADMIN — IPRATROPIUM BROMIDE AND ALBUTEROL SULFATE 3 ML: .5; 3 SOLUTION RESPIRATORY (INHALATION) at 03:15

## 2020-01-28 RX ADMIN — IPRATROPIUM BROMIDE AND ALBUTEROL SULFATE 3 ML: .5; 3 SOLUTION RESPIRATORY (INHALATION) at 19:49

## 2020-01-28 RX ADMIN — OXYCODONE HYDROCHLORIDE 5 MG: 5 TABLET ORAL at 11:18

## 2020-01-28 RX ADMIN — GABAPENTIN 300 MG: 300 CAPSULE ORAL at 06:22

## 2020-01-28 RX ADMIN — ACETYLCYSTEINE 3 ML: 200 INHALANT RESPIRATORY (INHALATION) at 13:54

## 2020-01-28 RX ADMIN — METHYLPREDNISOLONE SODIUM SUCCINATE 40 MG: 40 INJECTION, POWDER, FOR SOLUTION INTRAMUSCULAR; INTRAVENOUS at 01:24

## 2020-01-28 RX ADMIN — APIXABAN 5 MG: 5 TABLET, FILM COATED ORAL at 06:23

## 2020-01-28 RX ADMIN — OXYCODONE HYDROCHLORIDE 5 MG: 5 TABLET ORAL at 18:14

## 2020-01-28 RX ADMIN — PREDNISONE 60 MG: 20 TABLET ORAL at 06:21

## 2020-01-28 RX ADMIN — LOSARTAN POTASSIUM 100 MG: 50 TABLET, FILM COATED ORAL at 06:23

## 2020-01-28 RX ADMIN — GUAIFENESIN 1200 MG: 600 TABLET, EXTENDED RELEASE ORAL at 18:12

## 2020-01-28 RX ADMIN — SENNOSIDES AND DOCUSATE SODIUM 2 TABLET: 8.6; 5 TABLET ORAL at 18:12

## 2020-01-28 RX ADMIN — Medication 400 MG: at 12:17

## 2020-01-28 RX ADMIN — INSULIN HUMAN 1 UNITS: 100 INJECTION, SOLUTION PARENTERAL at 16:49

## 2020-01-28 SDOH — ECONOMIC STABILITY: TRANSPORTATION INSECURITY
IN THE PAST 12 MONTHS, HAS THE LACK OF TRANSPORTATION KEPT YOU FROM MEDICAL APPOINTMENTS OR FROM GETTING MEDICATIONS?: NO

## 2020-01-28 SDOH — ECONOMIC STABILITY: TRANSPORTATION INSECURITY
IN THE PAST 12 MONTHS, HAS LACK OF TRANSPORTATION KEPT YOU FROM MEETINGS, WORK, OR FROM GETTING THINGS NEEDED FOR DAILY LIVING?: NO

## 2020-01-28 SDOH — ECONOMIC STABILITY: FOOD INSECURITY: WITHIN THE PAST 12 MONTHS, YOU WORRIED THAT YOUR FOOD WOULD RUN OUT BEFORE YOU GOT MONEY TO BUY MORE.: NEVER TRUE

## 2020-01-28 SDOH — ECONOMIC STABILITY: INCOME INSECURITY: HOW HARD IS IT FOR YOU TO PAY FOR THE VERY BASICS LIKE FOOD, HOUSING, MEDICAL CARE, AND HEATING?: NOT HARD AT ALL

## 2020-01-28 SDOH — ECONOMIC STABILITY: FOOD INSECURITY: WITHIN THE PAST 12 MONTHS, THE FOOD YOU BOUGHT JUST DIDN'T LAST AND YOU DIDN'T HAVE MONEY TO GET MORE.: NEVER TRUE

## 2020-01-28 ASSESSMENT — ENCOUNTER SYMPTOMS
CLAUDICATION: 0
NAUSEA: 0
DIARRHEA: 0
COUGH: 1
ABDOMINAL PAIN: 0
DIZZINESS: 0
WEAKNESS: 0
FEVER: 0
PND: 0
SENSORY CHANGE: 0
BLOOD IN STOOL: 0
ORTHOPNEA: 0
INSOMNIA: 0
HEADACHES: 0
WHEEZING: 0
SPUTUM PRODUCTION: 0
CONSTIPATION: 0
DEPRESSION: 0
DIAPHORESIS: 0
SHORTNESS OF BREATH: 1
FOCAL WEAKNESS: 0
HEMOPTYSIS: 0
NERVOUS/ANXIOUS: 0
VOMITING: 0
SPEECH CHANGE: 0
PALPITATIONS: 0

## 2020-01-28 ASSESSMENT — CHA2DS2 SCORE
HYPERTENSION: YES
DIABETES: YES
AGE 65 TO 74: NO
CHA2DS2 VASC SCORE: 2
VASCULAR DISEASE: NO
SEX: MALE
CHF OR LEFT VENTRICULAR DYSFUNCTION: NO
PRIOR STROKE OR TIA OR THROMBOEMBOLISM: NO
AGE 75 OR GREATER: NO

## 2020-01-28 NOTE — CARE PLAN
Problem: Communication  Goal: The ability to communicate needs accurately and effectively will improve  Outcome: PROGRESSING AS EXPECTED  Note:   Instructed pt to tell staff if she cannot hear or understand when we are talking to her     Problem: Safety  Goal: Will remain free from injury  Outcome: PROGRESSING AS EXPECTED  Note:   Instructed pt to call for assistance when getting out of bed     Problem: Infection  Goal: Will remain free from infection  Outcome: PROGRESSING AS EXPECTED  Note:   Give abx as ordered

## 2020-01-28 NOTE — PROGRESS NOTES
Rm from Lab called with critical result of WBC 2.1 at 0215. Critical lab result read back to Rm.   Dr. Angulo notified of critical lab result at 0220.  Critical lab result read back by Dr. Angulo.

## 2020-01-28 NOTE — ED NOTES
Med rec updated and complete. Allergies reviewed.  Met  With pt at bedside. Dicussed current medications.and last doses taken. Pt denies antibiotic use in last 14 days.    Home pharmacy [facility].    Temo complete.

## 2020-01-28 NOTE — CARE PLAN
Problem: Safety  Goal: Will remain free from falls  Outcome: PROGRESSING AS EXPECTED  Note:   Pt demonstrates correct understanding of call light system.     Problem: Knowledge Deficit  Goal: Knowledge of disease process/condition, treatment plan, diagnostic tests, and medications will improve  Outcome: PROGRESSING AS EXPECTED  Note:   Discussed POC with pt. Pt verbalizes understanding and is agreeable.

## 2020-01-28 NOTE — PROGRESS NOTES
Community Health Worker Intake completed at bedside 1/28/20 by PERLITA Thompson.  • Social determinates of health intake completed.   • No barriers identified.  • Contact information provided to April Alicia.  • Pt. Stated she will be doing a walk-in with PCP post d/c.  • Referral to RN or SW declined at this time.   • Pt. Provided permission to text.    Pt. Lives in a house, has a support system, and feels confident managing her health.    Plan:  PERLITA Thompson will TC post d/c for follow-up questions.

## 2020-01-28 NOTE — ED PROVIDER NOTES
ED Provider Note    Scribed for Nereyda Hope M.D. by Abdoulaye Marion. 1/27/2020, 4:13 PM.    Primary care provider: Juanita King N.P.  Means of arrival: Wheel chair  History obtained from: Patient  History limited by: None    CHIEF COMPLAINT  Chief Complaint   Patient presents with   • Cough     x1 week   • Sent by MD     Seen at [facility] and sent for pneumonia after 2 view CXR.        HPI  April Alicia is a 59 y.o. female who presents to the Emergency Department for a worsening sputum producing cough onset 2 weeks ago. Patient reports associated symptoms of congestion, heart pain/pressure, aching, fever, vomiting, and diarrhea. Patient states that vomiting and diarrhea has dissipated at this time. Patient adds that last week she was given amoxicillin at the [facility] but when her primary care physician returned today she advised the patient not to take the amoxicillin. The patient reports having a chest x-ray performed this morning at the clinic which may have indicated that the patient has pneumonia prompting ED visit today. Patient denies any shortness of breath. She adds that she is a type 2 diabetic which she currently takes Metformin for. Patient notes she has COPD and recently quit smoking in December. Patient notes that she had to use an oxygen tank a few years ago while sick but currently does not. Patient regularly follows up with cardiology and has appointment scheduled for next month.    REVIEW OF SYSTEMS  Pertinent positives include sputum producing cough, congestion, heart pain/pressure, aching, fever, vomiting, and diarrhea..   Pertinent negatives include no shortness of breath.   As above, all other systems reviewed and are negative.   See HPI for further details.     PAST MEDICAL HISTORY  Past Medical History:   Diagnosis Date   • Arthritis    • ASTHMA    • Diabetes    • Heart abnormality     leakage in left valve   • Heart valve disease    • Hypertension    •  Infection 9/4/2017   • Liver disease    • Seizure disorder (HCC)        SURGICAL HISTORY  Past Surgical History:   Procedure Laterality Date   • IRRIGATION & DEBRIDEMENT ORTHO Right 9/3/2017    Procedure: IRRIGATION & DEBRIDEMENT ORTHO, POLY EXCHANGE;  Surgeon: Jerome Russell M.D.;  Location: SURGERY [facility];  Service: Orthopedics   • COLONOSCOPY WITH CLIPPING  10/28/2015    Procedure: COLONOSCOPY WITH CLIPPING;  Surgeon: Ruben Colon M.D.;  Location: ENDOSCOPY [facility];  Service:    • COLONOSCOPY WITH SCLEROTHERAPY  10/28/2015    Procedure: COLONOSCOPY WITH SCLEROTHERAPY;  Surgeon: Ruben Colon M.D.;  Location: ENDOSCOPY [facility];  Service:    • COLONOSCOPY WITH TATTOOING  10/28/2015    Procedure: COLONOSCOPY WITH TATTOOING;  Surgeon: Ruben Colon M.D.;  Location: ENDOSCOPY [facility];  Service:    • GYN SURGERY  2003    hysteroscoopy   • GYN SURGERY  1982    tubal ligation       SOCIAL HISTORY  Social History     Tobacco Use   • Smoking status: Former Smoker     Last attempt to quit: 12/29/2016     Years since quitting: 3.0   • Smokeless tobacco: Never Used   • Tobacco comment: 10/2010   Substance Use Topics   • Alcohol use: No   • Drug use: No      Social History     Substance and Sexual Activity   Drug Use No       FAMILY HISTORY  None noted     MEDICATIONS  Current Facility-Administered Medications:   •  cefTRIAXone (ROCEPHIN) 2 g in  mL IVPB, 2 g, Intravenous, Once, Nereyda Hope M.D.  •  doxycycline monohydrate (ADOXA) tablet 100 mg, 100 mg, Oral, Once, Nereyda Hope M.D.  •  predniSONE (DELTASONE) tablet 60 mg, 60 mg, Oral, DAILY, Nereyda Hope M.D.    Current Outpatient Medications:   •  oxyCODONE immediate release (ROXICODONE) 10 MG immediate release tablet, Take 10 mg by mouth every 6 hours as needed for Severe Pain., Disp: , Rfl:   •  Edoxaban Tosylate (SAVAYSA) 30 MG Tab, Take 1 Tab by mouth every morning., Disp: , Rfl:   •  magnesium oxide (MAG-OX)  "400 MG Tab, Take 400 mg by mouth every day., Disp: , Rfl:   •  sulfamethoxazole-trimethoprim (BACTRIM DS) 800-160 MG tablet, Take 1 Tab by mouth 2 times a day., Disp: , Rfl:   •  docusate sodium (COLACE) 100 MG Cap, Take 100 mg by mouth 2 times a day., Disp: , Rfl:   •  metformin (GLUCOPHAGE) 850 MG Tab, Take 850 mg by mouth 2 times a day, with meals., Disp: , Rfl:   •  pantoprazole (PROTONIX) 40 MG Tablet Delayed Response, Take 40 mg by mouth every day., Disp: , Rfl:   •  felodipine (PLENDIL) 10 MG TABLET SR 24 HR, Take 10 mg by mouth every day., Disp: , Rfl:   •  gabapentin (NEURONTIN) 300 MG Cap, Take 300 mg by mouth 3 times a day., Disp: , Rfl:   •  losartan (COZAAR) 100 MG Tab, Take 100 mg by mouth every day., Disp: , Rfl:   •  cetirizine (ZYRTEC) 10 MG Tab, Take 10 mg by mouth every day., Disp: , Rfl:   •  albuterol (VENTOLIN OR PROVENTIL) 108 (90 BASE) MCG/ACT Aero Soln inhalation aerosol, Inhale 2 Puffs by mouth every 6 hours as needed for Shortness of Breath., Disp: , Rfl:     ALLERGIES  Allergies   Allergen Reactions   • Nkda [No Known Drug Allergy]        PHYSICAL EXAM  VITAL SIGNS: /86   Pulse 60   Temp 37.3 °C (99.1 °F) (Temporal)   Resp 17   Ht 1.715 m (5' 7.5\")   Wt 83.9 kg (185 lb)   LMP 06/02/2008   SpO2 93% on room air which is slightly hypoxic by my interpretation  BMI 28.55 kg/m²   Vitals reviewed.    Pulse Oximetry was obtained. It showed a reading of 98%.  I interpreted this as normal.    Consitutional: Well-developed, well-nourished. Negative for: distress.  HENT: Normocephalic, right external ear normal, left external ear normal, oropharynx clear and moist.  Eyes: Conjunctivae normal, extraocular movements normal. Negative for: discharge in right and left eye, icterus.  Neck: Range of motion normal, supple. Negative for cervical adenopathy.  Cardiovascular: Multiple extra systoles, Positive jugular vein distention, heart sounds normal, intact distal pulses. Negative for: murmur, " rub, gallop.  Pulmonary/Chest Wall: Rhonchi heard in left base, decreased breath sounds throughout, effort normal. Negative for: respiratory distress, wheezes, rales.  Abdominal: Soft, bowel sounds normal. Negative for: distention, tenderness, rebound, guarding.  Musculoskeletal: Trace pitting edema bilaterally in feet and ankles, Normal range of motion.   Neurological: Alert and oriented x3. No focal deficits.  Skin: Warm, dry. Negative for rash.  Psych: Mood/affect normal, behavior normal, judgment normal.    DIAGNOSTIC STUDIES / PROCEDURES    LABS  Results for orders placed or performed during the hospital encounter of 01/27/20   CBC WITH DIFFERENTIAL   Result Value Ref Range    WBC 4.1 (L) 4.8 - 10.8 K/uL    RBC 4.24 4.20 - 5.40 M/uL    Hemoglobin 12.2 12.0 - 16.0 g/dL    Hematocrit 36.9 (L) 37.0 - 47.0 %    MCV 87.0 81.4 - 97.8 fL    MCH 28.8 27.0 - 33.0 pg    MCHC 33.1 (L) 33.6 - 35.0 g/dL    RDW 43.8 35.9 - 50.0 fL    Platelet Count 106 (L) 164 - 446 K/uL    MPV 10.4 9.0 - 12.9 fL    Neutrophils-Polys 69.50 44.00 - 72.00 %    Lymphocytes 19.40 (L) 22.00 - 41.00 %    Monocytes 8.50 0.00 - 13.40 %    Eosinophils 1.70 0.00 - 6.90 %    Basophils 0.70 0.00 - 1.80 %    Immature Granulocytes 0.20 0.00 - 0.90 %    Nucleated RBC 0.00 /100 WBC    Neutrophils (Absolute) 2.86 2.00 - 7.15 K/uL    Lymphs (Absolute) 0.80 (L) 1.00 - 4.80 K/uL    Monos (Absolute) 0.35 0.00 - 0.85 K/uL    Eos (Absolute) 0.07 0.00 - 0.51 K/uL    Baso (Absolute) 0.03 0.00 - 0.12 K/uL    Immature Granulocytes (abs) 0.01 0.00 - 0.11 K/uL    NRBC (Absolute) 0.00 K/uL   COMP METABOLIC PANEL   Result Value Ref Range    Sodium 138 135 - 145 mmol/L    Potassium 3.4 (L) 3.6 - 5.5 mmol/L    Chloride 106 96 - 112 mmol/L    Co2 23 20 - 33 mmol/L    Anion Gap 9.0 0.0 - 11.9    Glucose 150 (H) 65 - 99 mg/dL    Bun 8 8 - 22 mg/dL    Creatinine 0.73 0.50 - 1.40 mg/dL    Calcium 9.3 8.5 - 10.5 mg/dL    AST(SGOT) 18 12 - 45 U/L    ALT(SGPT) 12 2 - 50 U/L     Alkaline Phosphatase 87 30 - 99 U/L    Total Bilirubin 0.7 0.1 - 1.5 mg/dL    Albumin 3.5 3.2 - 4.9 g/dL    Total Protein 7.3 6.0 - 8.2 g/dL    Globulin 3.8 (H) 1.9 - 3.5 g/dL    A-G Ratio 0.9 g/dL   ESTIMATED GFR   Result Value Ref Range    GFR If African American >60 >60 mL/min/1.73 m 2    GFR If Non African American >60 >60 mL/min/1.73 m 2   TROPONIN   Result Value Ref Range    Troponin T 7 6 - 19 ng/L   proBrain Natriuretic Peptide, NT   Result Value Ref Range    NT-proBNP 658 (H) 0 - 125 pg/mL   Blood Culture,Hold   Result Value Ref Range    Blood Culture Hold Collected      All labs reviewed by me.      COURSE & MEDICAL DECISION MAKING  Nursing notes, VS, PMSFHx reviewed in chart.    4:13 PM Patient seen and examined at bedside. The patient presents with productive cough and the differential diagnosis includes but is not limited to COPD exacerbation, pneumonia, bronchitis, CHF. Ordered DX-chest, troponin, proBrain Natriuretic peptide, estimated GFR, CBC with differential, CMP, and EKG. Patient will be treated with Rocephin 2 g, Adoxa 100 mg, Deltasone 60 mg for her symptoms.      5:27 PM I spoke with the hospitalist about admitting the patient for further observation and treatment.    DISPOSITION:  Patient will be hospitalized by Dr. Beaver in fair condition.      FINAL IMPRESSION  1. Pneumonia of left lower lobe due to infectious organism (HCC)    2. Acute exacerbation of chronic obstructive pulmonary disease (COPD) (HCC)          Abdoulaye ARGUETA (Lisandro), am scribing for, and in the presence of, Nereyda Hpoe M.D..    Electronically signed by: Abdoulaye Marion (Lisandro), 1/27/2020    Nereyda ARGUETA M.D. personally performed the services described in this documentation, as scribed by Abdoulaye Marion in my presence, and it is both accurate and complete. C    The note accurately reflects work and decisions made by me.  Nereyda Hope M.D.  1/27/2020  5:27 PM

## 2020-01-28 NOTE — PROGRESS NOTES
Pt arrived to unit at 1805 via wheelchair.  /68, HR 51 SpO2 97% in room air.  Assessment complete. No signs of distress noted at this time.  Pt reports 8/10 left knee pain, will adminisiter pain medication when available.Fall precautions and appropriate signs in place.    Pt educated on fall precautions, aware of risks, bed alarm refused, pt calls for assistance appropriately.  Pt educated on call light use, phone use and ext numbers provided to pt.  Pt denies any additional needs at this time.  Call light within reach. Will continue to monitor.

## 2020-01-28 NOTE — PROGRESS NOTES
Assumed care at 1900, new admit from ED transferred just prior to shift change.  Patient alert and oriented.  Resting in bed.  Requesting pian medication and food.  Call light in reach, bed in low lock position.

## 2020-01-28 NOTE — PROGRESS NOTES
Hospital Medicine Daily Progress Note    Date of Service  1/28/2020    Chief Complaint  Cough, fever, chills    Hospital Course   Ms. Alicia is a 59-year-old female who presented to the emergency department on 1/27/2020 with worsening productive cough x2 weeks accompanied by fevers and chills.  She initially presented to her [facility] clinic 1 week ago and was given amoxicillin but returned to the clinic just prior to ED admission where a chest x-ray was performed and indicated that she has pneumonia.  She was subsequently referred here where she was also found to have a COPD exacerbation.  She was subsequently started on oral azithromycin, RT protocol, systemic steroids, and admitted to the hospital for further monitoring and treatment.      Interval Problem Update  Still short of breath & coughing. Feels ill. Obtaining echocardiogram & EKG given physical assessment findings.     WBC critically low at 2.1 today.  3.7 on recheck, which is her baseline. CMP unremarkable.  Chest x-ray done here showed no acute cardiopulmonary abnormalities.  BNP yesterday elevated at 658.    Afebrile overnight, HR 50s-70s, SBP 100s-140s, O2 saturations within the normal range on room air.    Consultants/Specialty  None    Code Status  Full code    Disposition  Clinical for now    Review of Systems  Review of Systems   Constitutional: Positive for malaise/fatigue. Negative for diaphoresis and fever.   Respiratory: Positive for cough and shortness of breath. Negative for hemoptysis, sputum production and wheezing.    Cardiovascular: Negative for chest pain, palpitations, orthopnea, claudication, leg swelling and PND.   Gastrointestinal: Negative for abdominal pain, blood in stool, constipation, diarrhea, melena, nausea and vomiting.   Genitourinary: Negative for dysuria, frequency, hematuria and urgency.   Neurological: Negative for dizziness, sensory change, speech change, focal weakness, weakness and headaches.    Psychiatric/Behavioral: Negative for depression. The patient is not nervous/anxious and does not have insomnia.    All other systems reviewed and are negative.     Physical Exam  Temp:  [36.8 °C (98.2 °F)-37.3 °C (99.1 °F)] 36.8 °C (98.3 °F)  Pulse:  [57-93] 69  Resp:  [16-18] 18  BP: (109-142)/(60-86) 126/75  SpO2:  [93 %-98 %] 96 %    Physical Exam  Vitals signs and nursing note reviewed.   Constitutional:       General: She is awake. She is not in acute distress.     Appearance: Normal appearance. She is well-developed and overweight. She is ill-appearing.   HENT:      Head: Normocephalic and atraumatic.      Mouth/Throat:      Lips: Pink.      Mouth: Mucous membranes are moist.   Eyes:      Conjunctiva/sclera: Conjunctivae normal.      Pupils: Pupils are equal, round, and reactive to light.   Neck:      Musculoskeletal: Normal range of motion and neck supple.   Cardiovascular:      Rate and Rhythm: Normal rate. Rhythm irregular.      Pulses: Normal pulses.      Heart sounds: Murmur present.   Pulmonary:      Effort: Pulmonary effort is normal.      Breath sounds: Examination of the right-lower field reveals decreased breath sounds. Examination of the left-lower field reveals decreased breath sounds. Decreased breath sounds present.   Abdominal:      General: There is no distension or abdominal bruit.      Palpations: Abdomen is soft.      Tenderness: There is no tenderness.   Musculoskeletal:      Right lower leg: No edema.      Left lower leg: No edema.   Skin:     General: Skin is warm and dry.   Neurological:      General: No focal deficit present.      Mental Status: She is alert and oriented to person, place, and time.      GCS: GCS eye subscore is 4. GCS verbal subscore is 5. GCS motor subscore is 6.      Cranial Nerves: Cranial nerves are intact.      Sensory: Sensation is intact.      Motor: Motor function is intact.      Coordination: Coordination is intact.      Gait: Gait is intact.   Psychiatric:          Attention and Perception: Attention and perception normal.         Mood and Affect: Mood and affect normal.         Speech: Speech normal.         Behavior: Behavior normal. Behavior is cooperative.         Thought Content: Thought content normal.         Cognition and Memory: Cognition and memory normal.         Judgment: Judgment normal.     Fluids    Intake/Output Summary (Last 24 hours) at 1/28/2020 1043  Last data filed at 1/28/2020 0900  Gross per 24 hour   Intake 340 ml   Output no documentation   Net 340 ml     Laboratory  Recent Labs     01/27/20  1415 01/28/20  0105 01/28/20  0822   WBC 4.1* 2.1* 3.7*   RBC 4.24 3.93* 4.20   HEMOGLOBIN 12.2 11.3* 12.3   HEMATOCRIT 36.9* 34.3* 36.4*   MCV 87.0 87.3 86.7   MCH 28.8 28.8 29.3   MCHC 33.1* 32.9* 33.8   RDW 43.8 44.6 44.2   PLATELETCT 106* 79* 91*   MPV 10.4 10.8 11.0     Recent Labs     01/27/20  1415 01/28/20  0105   SODIUM 138 142   POTASSIUM 3.4* 3.6   CHLORIDE 106 109   CO2 23 23   GLUCOSE 150* 262*   BUN 8 12   CREATININE 0.73 0.78   CALCIUM 9.3 8.9     Imaging  DX-CHEST-LIMITED (1 VIEW)   Final Result      No acute cardiopulmonary abnormality identified.      EC-ECHOCARDIOGRAM COMPLETE W/O CONT    (Results Pending)      Assessment/Plan  Acute exacerbation of chronic obstructive pulmonary disease (COPD) (HCC)- (present on admission)  Assessment & Plan  -Continue azithromycin, RT protocol, prednisone.   -Procal & cxr negative.   -SOB with elevated BNP & murmur, check echo.    Acquired circulating anticoagulants (HCC)- (present on admission)  Assessment & Plan  -Unclear about why she is on this. Irregular heart rhythm on exam, suspect a-fib? Check EKG.   -Continue home eliquis. Monitor for bleeding.     Hypertension- (present on admission)  Assessment & Plan  -Relatively well controlled on current regimen. Continue home losartan & amlodipine.   -Trend per unit protocol.     Controlled type 2 diabetes mellitus with hyperglycemia, without long-term  current use of insulin (HCC)- (present on admission)  Assessment & Plan  -Resume home metformin this evening.   -Continue accuchecks ACHS with SSI as required.   -Hyperglycemia influenced by steroids.   -Check A1c tomorrow.   -Hypoglycemia protocol in place if needed.     Thrombocytopenia (HCC)- (present on admission)  Assessment & Plan  -Chronic. Just below baseline. Recheck level tomorrow. No evidence of bleeding.     Leukopenia- (present on admission)  Assessment & Plan  -Chronic. At baseline on recheck this morning.      Diabetic polyneuropathy associated with type 2 diabetes mellitus (HCC)- (present on admission)  Assessment & Plan  -Chronic & stable. Continue home gabapentin.     Cirrhosis, alcoholic (HCC)- (present on admission)  Assessment & Plan  -History of.      VTE prophylaxis: Eliquis      Electronically signed by:  Kerline Mason, MSN, RN, APRN, ACNPC-AG, CCRN  Nurse Practitioner, [facility]  Work # [phone]    1/28/2020    10:45 AM

## 2020-01-28 NOTE — DISCHARGE PLANNING
Patient is eligible for Medicaid Meds to Beds at discharge if they have coverage with [place] Medicaid, Medicaid FFS, Medicaid HMO ([facility]), or [place]. This service is provided through the [facility] if orders are received by the pharmacy prior to 4pm Monday through Friday excluding holidays. Preferred pharmacy has been changed to [facility]. Please call [phone] prior to discharge.

## 2020-01-28 NOTE — ASSESSMENT & PLAN NOTE
-Continue azithromycin, RT protocol, prednisone.   -Procal & cxr negative.   -SOB with elevated BNP & murmur.  Echocardiogram without clear cardiac cause.

## 2020-01-28 NOTE — CARE PLAN
Problem: Bronchoconstriction:  Goal: Improve in air movement and diminished wheezing  Outcome: PROGRESSING AS EXPECTED   Duo Q4    Problem: Bronchopulmonary Hygiene:  Goal: Increase mobilization of retained secretions  Outcome: PROGRESSING AS EXPECTED   Mucomyst Q4  Flutter QID

## 2020-01-28 NOTE — ED NOTES
Attempted to call report, was transferred to RN but no answer, will try again or can be reached at [phone]

## 2020-01-28 NOTE — ASSESSMENT & PLAN NOTE
-Resume home metformin this evening.   -Continue accuchecks ACHS with SSI as required.   -Hyperglycemia influenced by steroids.   -Check A1c tomorrow.   -Hypoglycemia protocol in place if needed.

## 2020-01-28 NOTE — ASSESSMENT & PLAN NOTE
-Resume home metformin.  -Continue accuchecks ACHS with SSI as required.   -Hyperglycemia influenced by steroids.   -A1c in process.  -Hypoglycemia protocol in place if needed.

## 2020-01-28 NOTE — CARE PLAN
Problem: Bronchoconstriction:  Goal: Improve in air movement and diminished wheezing  Outcome: PROGRESSING AS EXPECTED  Note:   DUO Q4     Problem: Bronchopulmonary Hygiene:  Goal: Increase mobilization of retained secretions  Outcome: PROGRESSING AS EXPECTED  Note:   MUCO Q4

## 2020-01-28 NOTE — PROGRESS NOTES
· 2 RN skin check complete.   · Devices in place none.  · Skin assessed under devices na.  · Confirmed pressure ulcers found on none.  · New potential pressure ulcers noted on na. Wound consult placed? na. Photo uploaded? na.   · The following interventions are in place educated to turn self.

## 2020-01-28 NOTE — ASSESSMENT & PLAN NOTE
-Unclear about why she is on this. Irregular heart rhythm on exam but an EKG showed PVC's, no a-fib. Review of records did not lend any help. RN checking with patient to see if she knows why.    -Continue home eliquis. Monitor for bleeding.

## 2020-01-28 NOTE — RESPIRATORY CARE
COPD EDUCATION by COPD CLINICAL EDUCATOR  1/28/2020  at  2:26 PM by Sun Jones, RRT     Patient interviewed by COPD education team.  Patient unable to participate in full program.  Short intervention has been conducted.  A comprehensive packet including information about COPD and home treatments with spacer instruct. Patient refuses smoking cessation at this time; states she quit smoking 12/19/2020. [facility] referral placed

## 2020-01-28 NOTE — H&P
Hospital Medicine History & Physical Note    Date of Service  1/27/2020    Primary Care Physician  Juanita King N.P.    Consultants  None    Code Status  full    Chief Complaint  SOB    History of Presenting Illness  59 y.o. female with past medical history of COPD, diabetes mellitus type 2, essential hypertension who presented 1/27/2020 with worsening cough and shortness of breath over the past 10 days.  She also complained about cough with yellowish-greenish sputum production.  Associated with fever and chill.  She initially presented to outside facility where she was found to have COPD exacerbation and pneumonia and then she was transferred here for further evaluation and management.    Review of Systems  Review of Systems   Constitutional: Positive for chills and fever. Negative for weight loss.   HENT: Negative for congestion and nosebleeds.    Eyes: Negative for blurred vision, pain, discharge and redness.   Respiratory: Positive for cough, sputum production and shortness of breath. Negative for stridor.    Cardiovascular: Negative for chest pain, palpitations and orthopnea.   Gastrointestinal: Negative for abdominal pain, diarrhea, heartburn, nausea and vomiting.   Genitourinary: Negative for dysuria, frequency and urgency.   Musculoskeletal: Negative for back pain, myalgias and neck pain.   Skin: Negative for itching and rash.   Neurological: Negative for dizziness, focal weakness, seizures and headaches.   Psychiatric/Behavioral: Negative for depression. The patient is not nervous/anxious and does not have insomnia.        Past Medical History   has a past medical history of Arthritis, ASTHMA, Diabetes, Heart abnormality, Heart valve disease, Hypertension, Infection (9/4/2017), Liver disease, and Seizure disorder (HCC). She also has no past medical history of CAD (coronary artery disease), Congestive heart failure (HCC), COPD, Psychiatric disorder, Renal disorder, or Stroke (HCC).    Surgical History   has  a past surgical history that includes gyn surgery (1982); gyn surgery (2003); colonoscopy with clipping (10/28/2015); colonoscopy with sclerotherapy (10/28/2015); colonoscopy with tattooing (10/28/2015); and irrigation & debridement ortho (Right, 9/3/2017).     Family History  Reviewed and no pertinent past family history    Social History   reports that she quit smoking about 3 years ago. She has never used smokeless tobacco. She reports that she does not drink alcohol or use drugs.    Allergies  Allergies   Allergen Reactions   • Nkda [No Known Drug Allergy]        Medications  Prior to Admission Medications   Prescriptions Last Dose Informant Patient Reported? Taking?   albuterol (VENTOLIN OR PROVENTIL) 108 (90 BASE) MCG/ACT Aero Soln inhalation aerosol 1/26/2020 at 1500 Patient Yes No   Sig: Inhale 2 Puffs by mouth every 6 hours as needed for Shortness of Breath.   amLODIPine (NORVASC) 5 MG Tab 1/27/2020 at 0730  Yes No   Sig: Take 5 mg by mouth every day.   apixaban (ELIQUIS) 5mg Tab 1/27/2020 at 0730  Yes No   Sig: Take 5 mg by mouth 2 Times a Day.   cetirizine (ZYRTEC) 10 MG Tab 1/27/2020 at 0730 Patient Yes No   Sig: Take 10 mg by mouth every day.   docusate sodium (COLACE) 100 MG Cap 1/27/2020 at 0730 Patient Yes No   Sig: Take 100 mg by mouth 2 times a day.   felodipine (PLENDIL) 10 MG TABLET SR 24 HR 1/26/2020 at 2100 Patient Yes No   Sig: Take 10 mg by mouth every bedtime.   gabapentin (NEURONTIN) 300 MG Cap 1/27/2020 at 0730 Patient Yes No   Sig: Take 300 mg by mouth 2 Times a Day.   losartan (COZAAR) 100 MG Tab 1/27/2020 at 0730 Patient Yes No   Sig: Take 100 mg by mouth every day.   magnesium oxide (MAG-OX) 400 MG Tab 1/27/2020 at 0730 Patient Yes No   Sig: Take 400 mg by mouth every day.   metformin (GLUCOPHAGE) 850 MG Tab 1/26/2020 at 1800 Patient Yes No   Sig: Take 850 mg by mouth every evening.   oxyCODONE immediate-release (ROXICODONE) 5 MG Tab 1/27/2020 at 0730  Yes Yes   Sig: Take 5 mg by mouth  every four hours as needed for Severe Pain.   oxyCODONE-acetaminophen (PERCOCET-10)  MG Tab 1/26/2020 at 2100  Yes No   Sig: Take 1-2 Tabs by mouth every four hours as needed for Severe Pain.      Facility-Administered Medications: None       Physical Exam  Temp:  [36.8 °C (98.2 °F)-37.3 °C (99.1 °F)] 37.3 °C (99.1 °F)  Pulse:  [60-81] 71  Resp:  [16-17] 17  BP: (119-133)/(74-86) 125/83  SpO2:  [93 %-97 %] 96 %    Physical Exam  Vitals signs reviewed.   Constitutional:       General: She is not in acute distress.     Appearance: Normal appearance.   HENT:      Head: Normocephalic and atraumatic.      Nose: No congestion or rhinorrhea.   Eyes:      Extraocular Movements: Extraocular movements intact.      Pupils: Pupils are equal, round, and reactive to light.   Neck:      Musculoskeletal: Normal range of motion and neck supple.   Cardiovascular:      Rate and Rhythm: Normal rate and regular rhythm.      Pulses: Normal pulses.   Pulmonary:      Effort: Pulmonary effort is normal. No respiratory distress.      Breath sounds: Wheezing present.   Abdominal:      General: Bowel sounds are normal. There is no distension.      Palpations: Abdomen is soft.      Tenderness: There is no tenderness.   Musculoskeletal:         General: No swelling or tenderness.   Skin:     General: Skin is warm.      Findings: No erythema.   Neurological:      General: No focal deficit present.      Mental Status: She is alert and oriented to person, place, and time.         Laboratory:  Recent Labs     01/27/20  1415   WBC 4.1*   RBC 4.24   HEMOGLOBIN 12.2   HEMATOCRIT 36.9*   MCV 87.0   MCH 28.8   MCHC 33.1*   RDW 43.8   PLATELETCT 106*   MPV 10.4     Recent Labs     01/27/20  1415   SODIUM 138   POTASSIUM 3.4*   CHLORIDE 106   CO2 23   GLUCOSE 150*   BUN 8   CREATININE 0.73   CALCIUM 9.3     Recent Labs     01/27/20  1415   ALTSGPT 12   ASTSGOT 18   ALKPHOSPHAT 87   TBILIRUBIN 0.7   GLUCOSE 150*         Recent Labs     01/27/20  1415    NTPROBNP 658*         Recent Labs     01/27/20  1415   TROPONINT 7       Urinalysis:    No results found     Imaging:  DX-CHEST-LIMITED (1 VIEW)    (Results Pending)         Assessment/Plan:  I anticipate this patient is appropriate for observation status at this time.    Acute exacerbation of chronic obstructive pulmonary disease (COPD) (HCC)- (present on admission)  Assessment & Plan  Started on DuoNeb nebulizer, albuterol inhaler, steroid  Azithromycin  Check procalcitonin  RT protocol    Type 2 diabetes mellitus with neurologic complication (HCC)- (present on admission)  Assessment & Plan  On sliding scale insulin    History of hypertension- (present on admission)  Assessment & Plan  Continue outpatient medications    Cirrhosis, alcoholic (HCC)- (present on admission)  Assessment & Plan  History of      VTE prophylaxis: eliquis

## 2020-01-28 NOTE — PROGRESS NOTES
Order clarified, solumedrol and prednisone order for this morning, Discussed with Dr Víctor Angulo, order received to discontinue solumedrol.

## 2020-01-29 ENCOUNTER — APPOINTMENT (OUTPATIENT)
Dept: CARDIOLOGY | Facility: MEDICAL CENTER | Age: 60
End: 2020-01-29
Attending: NURSE PRACTITIONER
Payer: MEDICAID

## 2020-01-29 PROBLEM — D72.819 LEUKOPENIA: Status: RESOLVED | Noted: 2020-01-28 | Resolved: 2020-01-29

## 2020-01-29 LAB
ANION GAP SERPL CALC-SCNC: 8 MMOL/L (ref 0–11.9)
BASOPHILS # BLD AUTO: 0.1 % (ref 0–1.8)
BASOPHILS # BLD: 0.01 K/UL (ref 0–0.12)
BUN SERPL-MCNC: 13 MG/DL (ref 8–22)
CALCIUM SERPL-MCNC: 9.2 MG/DL (ref 8.5–10.5)
CHLORIDE SERPL-SCNC: 110 MMOL/L (ref 96–112)
CO2 SERPL-SCNC: 24 MMOL/L (ref 20–33)
CREAT SERPL-MCNC: 0.67 MG/DL (ref 0.5–1.4)
EKG IMPRESSION: NORMAL
EOSINOPHIL # BLD AUTO: 0.01 K/UL (ref 0–0.51)
EOSINOPHIL NFR BLD: 0.1 % (ref 0–6.9)
ERYTHROCYTE [DISTWIDTH] IN BLOOD BY AUTOMATED COUNT: 43.6 FL (ref 35.9–50)
EST. AVERAGE GLUCOSE BLD GHB EST-MCNC: 126 MG/DL
GLUCOSE BLD-MCNC: 110 MG/DL (ref 65–99)
GLUCOSE BLD-MCNC: 119 MG/DL (ref 65–99)
GLUCOSE BLD-MCNC: 201 MG/DL (ref 65–99)
GLUCOSE SERPL-MCNC: 125 MG/DL (ref 65–99)
HBA1C MFR BLD: 6 % (ref 0–5.6)
HCT VFR BLD AUTO: 33.1 % (ref 37–47)
HGB BLD-MCNC: 11.1 G/DL (ref 12–16)
IMM GRANULOCYTES # BLD AUTO: 0.03 K/UL (ref 0–0.11)
IMM GRANULOCYTES NFR BLD AUTO: 0.4 % (ref 0–0.9)
LV EJECT FRACT  99904: 60
LV EJECT FRACT MOD 2C 99903: 62.85
LV EJECT FRACT MOD 4C 99902: 66.64
LV EJECT FRACT MOD BP 99901: 65.71
LYMPHOCYTES # BLD AUTO: 0.97 K/UL (ref 1–4.8)
LYMPHOCYTES NFR BLD: 14.1 % (ref 22–41)
MAGNESIUM SERPL-MCNC: 1.8 MG/DL (ref 1.5–2.5)
MCH RBC QN AUTO: 28.7 PG (ref 27–33)
MCHC RBC AUTO-ENTMCNC: 33.5 G/DL (ref 33.6–35)
MCV RBC AUTO: 85.5 FL (ref 81.4–97.8)
MONOCYTES # BLD AUTO: 0.42 K/UL (ref 0–0.85)
MONOCYTES NFR BLD AUTO: 6.1 % (ref 0–13.4)
NEUTROPHILS # BLD AUTO: 5.46 K/UL (ref 2–7.15)
NEUTROPHILS NFR BLD: 79.2 % (ref 44–72)
NRBC # BLD AUTO: 0 K/UL
NRBC BLD-RTO: 0 /100 WBC
PLATELET # BLD AUTO: 88 K/UL (ref 164–446)
PMV BLD AUTO: 9.6 FL (ref 9–12.9)
POTASSIUM SERPL-SCNC: 3.4 MMOL/L (ref 3.6–5.5)
RBC # BLD AUTO: 3.87 M/UL (ref 4.2–5.4)
SODIUM SERPL-SCNC: 142 MMOL/L (ref 135–145)
TROPONIN T SERPL-MCNC: 9 NG/L (ref 6–19)
WBC # BLD AUTO: 6.9 K/UL (ref 4.8–10.8)

## 2020-01-29 PROCEDURE — 93005 ELECTROCARDIOGRAM TRACING: CPT | Performed by: HOSPITALIST

## 2020-01-29 PROCEDURE — 700102 HCHG RX REV CODE 250 W/ 637 OVERRIDE(OP): Performed by: NURSE PRACTITIONER

## 2020-01-29 PROCEDURE — 93306 TTE W/DOPPLER COMPLETE: CPT | Mod: 26 | Performed by: INTERNAL MEDICINE

## 2020-01-29 PROCEDURE — 700101 HCHG RX REV CODE 250: Performed by: HOSPITALIST

## 2020-01-29 PROCEDURE — 94668 MNPJ CHEST WALL SBSQ: CPT | Mod: XU

## 2020-01-29 PROCEDURE — 700111 HCHG RX REV CODE 636 W/ 250 OVERRIDE (IP): Performed by: NURSE PRACTITIONER

## 2020-01-29 PROCEDURE — 93010 ELECTROCARDIOGRAM REPORT: CPT | Performed by: INTERNAL MEDICINE

## 2020-01-29 PROCEDURE — 700102 HCHG RX REV CODE 250 W/ 637 OVERRIDE(OP): Performed by: INTERNAL MEDICINE

## 2020-01-29 PROCEDURE — 82962 GLUCOSE BLOOD TEST: CPT | Mod: 91

## 2020-01-29 PROCEDURE — 94640 AIRWAY INHALATION TREATMENT: CPT

## 2020-01-29 PROCEDURE — 96372 THER/PROPH/DIAG INJ SC/IM: CPT

## 2020-01-29 PROCEDURE — 80048 BASIC METABOLIC PNL TOTAL CA: CPT

## 2020-01-29 PROCEDURE — A9270 NON-COVERED ITEM OR SERVICE: HCPCS | Performed by: NURSE PRACTITIONER

## 2020-01-29 PROCEDURE — 99225 PR SUBSEQUENT OBSERVATION CARE,LEVEL II: CPT | Performed by: HOSPITALIST

## 2020-01-29 PROCEDURE — 85025 COMPLETE CBC W/AUTO DIFF WBC: CPT

## 2020-01-29 PROCEDURE — G0378 HOSPITAL OBSERVATION PER HR: HCPCS

## 2020-01-29 PROCEDURE — 93306 TTE W/DOPPLER COMPLETE: CPT

## 2020-01-29 PROCEDURE — 84484 ASSAY OF TROPONIN QUANT: CPT

## 2020-01-29 PROCEDURE — 94760 N-INVAS EAR/PLS OXIMETRY 1: CPT

## 2020-01-29 PROCEDURE — 36415 COLL VENOUS BLD VENIPUNCTURE: CPT

## 2020-01-29 PROCEDURE — 83036 HEMOGLOBIN GLYCOSYLATED A1C: CPT

## 2020-01-29 PROCEDURE — A9270 NON-COVERED ITEM OR SERVICE: HCPCS | Performed by: INTERNAL MEDICINE

## 2020-01-29 PROCEDURE — 83735 ASSAY OF MAGNESIUM: CPT

## 2020-01-29 RX ORDER — POTASSIUM CHLORIDE 20 MEQ/1
20 TABLET, EXTENDED RELEASE ORAL ONCE
Status: COMPLETED | OUTPATIENT
Start: 2020-01-29 | End: 2020-01-29

## 2020-01-29 RX ORDER — CETIRIZINE HYDROCHLORIDE 10 MG/1
10 TABLET ORAL DAILY
Status: DISCONTINUED | OUTPATIENT
Start: 2020-01-29 | End: 2020-01-30 | Stop reason: HOSPADM

## 2020-01-29 RX ORDER — FELODIPINE 10 MG/1
10 TABLET, EXTENDED RELEASE ORAL
Status: DISCONTINUED | OUTPATIENT
Start: 2020-01-29 | End: 2020-01-29

## 2020-01-29 RX ADMIN — AZITHROMYCIN 500 MG: 250 TABLET, FILM COATED ORAL at 06:05

## 2020-01-29 RX ADMIN — SENNOSIDES AND DOCUSATE SODIUM 2 TABLET: 8.6; 5 TABLET ORAL at 19:30

## 2020-01-29 RX ADMIN — Medication 400 MG: at 06:02

## 2020-01-29 RX ADMIN — CETIRIZINE HYDROCHLORIDE 10 MG: 10 TABLET, FILM COATED ORAL at 16:06

## 2020-01-29 RX ADMIN — APIXABAN 5 MG: 5 TABLET, FILM COATED ORAL at 06:02

## 2020-01-29 RX ADMIN — POTASSIUM CHLORIDE 20 MEQ: 1500 TABLET, EXTENDED RELEASE ORAL at 09:33

## 2020-01-29 RX ADMIN — METFORMIN HYDROCHLORIDE 850 MG: 850 TABLET ORAL at 19:31

## 2020-01-29 RX ADMIN — IPRATROPIUM BROMIDE AND ALBUTEROL SULFATE 3 ML: .5; 3 SOLUTION RESPIRATORY (INHALATION) at 02:16

## 2020-01-29 RX ADMIN — GUAIFENESIN 1200 MG: 600 TABLET, EXTENDED RELEASE ORAL at 19:30

## 2020-01-29 RX ADMIN — PREDNISONE 40 MG: 20 TABLET ORAL at 06:02

## 2020-01-29 RX ADMIN — GABAPENTIN 300 MG: 300 CAPSULE ORAL at 06:02

## 2020-01-29 RX ADMIN — LOSARTAN POTASSIUM 100 MG: 50 TABLET, FILM COATED ORAL at 06:01

## 2020-01-29 RX ADMIN — OXYCODONE HYDROCHLORIDE 5 MG: 5 TABLET ORAL at 16:06

## 2020-01-29 RX ADMIN — GABAPENTIN 300 MG: 300 CAPSULE ORAL at 19:31

## 2020-01-29 RX ADMIN — AMLODIPINE BESYLATE 5 MG: 5 TABLET ORAL at 06:02

## 2020-01-29 RX ADMIN — OXYCODONE HYDROCHLORIDE 5 MG: 5 TABLET ORAL at 02:22

## 2020-01-29 RX ADMIN — INSULIN HUMAN 2 UNITS: 100 INJECTION, SOLUTION PARENTERAL at 11:11

## 2020-01-29 RX ADMIN — GUAIFENESIN 1200 MG: 600 TABLET, EXTENDED RELEASE ORAL at 06:02

## 2020-01-29 RX ADMIN — SENNOSIDES AND DOCUSATE SODIUM 2 TABLET: 8.6; 5 TABLET ORAL at 06:02

## 2020-01-29 RX ADMIN — IPRATROPIUM BROMIDE AND ALBUTEROL SULFATE 3 ML: .5; 3 SOLUTION RESPIRATORY (INHALATION) at 12:45

## 2020-01-29 RX ADMIN — OXYCODONE HYDROCHLORIDE 5 MG: 5 TABLET ORAL at 08:06

## 2020-01-29 RX ADMIN — APIXABAN 5 MG: 5 TABLET, FILM COATED ORAL at 19:31

## 2020-01-29 RX ADMIN — IPRATROPIUM BROMIDE AND ALBUTEROL SULFATE 3 ML: .5; 3 SOLUTION RESPIRATORY (INHALATION) at 08:28

## 2020-01-29 RX ADMIN — IPRATROPIUM BROMIDE AND ALBUTEROL SULFATE 3 ML: .5; 3 SOLUTION RESPIRATORY (INHALATION) at 15:23

## 2020-01-29 ASSESSMENT — ENCOUNTER SYMPTOMS
SPEECH CHANGE: 0
HEMOPTYSIS: 0
INSOMNIA: 0
WEAKNESS: 0
CONSTIPATION: 0
FEVER: 0
SPUTUM PRODUCTION: 0
DIZZINESS: 0
PND: 0
ABDOMINAL PAIN: 0
COUGH: 0
DIARRHEA: 0
CLAUDICATION: 0
NERVOUS/ANXIOUS: 0
SHORTNESS OF BREATH: 0
DEPRESSION: 0
DIAPHORESIS: 0
FOCAL WEAKNESS: 0
PALPITATIONS: 0
WHEEZING: 0
ORTHOPNEA: 0
HEADACHES: 0
VOMITING: 0
NAUSEA: 0
SENSORY CHANGE: 0

## 2020-01-29 NOTE — PROGRESS NOTES
Pt c/o left sided chest pain/pressure radiating to left shoulder and head ache.  Called [facility] RN to assess, Called oncall physician to update, orders received for repeat EKG and troponin.  Will continue to monitor.

## 2020-01-29 NOTE — PROGRESS NOTES
Assumed care at 1900.  Patient alert and oriented.  Up ad phuc, walking in the halls with FWW.  No new complaints.

## 2020-01-29 NOTE — CARE PLAN
Problem: Communication  Goal: The ability to communicate needs accurately and effectively will improve  Outcome: PROGRESSING AS EXPECTED  Note:   Patient is hard of hearing, but can verbalize her needs effectively.     Problem: Pain Management  Goal: Pain level will decrease to patient's comfort goal  Outcome: PROGRESSING AS EXPECTED  Flowsheets (Taken 1/29/2020 0800)  Pain Rating Scale (NPRS): 8  Note:   Patient reported pain 8/10 in knee that is controlled with prescribed PRN medication. Patient has walked around the unit with a front wheel walker twice today with no complaint of increased pain with ambulation.

## 2020-01-29 NOTE — PROGRESS NOTES
Assumed care of patient at 0700. Report received from DANYEL Nayak. Patient A&Ox4 but hard of hearing, 8/10 pain in left knee, other vitals stable on room oxygen. Patient was put on 1L O2 via NC for comfort after walking to and from the bathroom per OJ Churchill. Patient is self assist with front wheel walker and able to turn self. No complaints. Call light within reach; will continue to monitor.

## 2020-01-29 NOTE — PROGRESS NOTES
[facility] Medicine Daily Progress Note    Date of Service  1/29/2020    Chief Complaint  Cough, fever, chills    Hospital Course   Ms. Alicia is a 59-year-old female who presented to the emergency department on 1/27/2020 with worsening productive cough x2 weeks accompanied by fevers and chills.  She initially presented to her [facility] clinic 1 week ago and was given amoxicillin but returned to the clinic just prior to ED admission where a chest x-ray was performed and indicated that she has pneumonia.  She was subsequently referred here where she was also found to have a COPD exacerbation.  She was subsequently started on oral azithromycin, RT protocol, systemic steroids, and admitted to the hospital for further monitoring and treatment.      Interval Problem Update  Very tachypneic at time of exam after hobbling back from the shower, says she usually gets like this after showering.  This largely subsided prior to my departure from the room.     Complaining of left knee pain which is chonic, says she is scheduled for a knee replacement on 2/17/20.     WBC improved today, up to 6.9.  H/H down 1 g overnight.  BMP showed potassium level of 3.4 which has been repleted.  Magnesium level 1.8.  Troponin negative.  Blood sugars over the last 24 hours ranged 119-217.  Echocardiogram showed a normal ejection fraction of 60% with mild TR and MR and RVSP of 37 mmHg.    Afebrile overnight, HR 60s-70s, SBP 130s-140s, O2 saturations 94-97% on room air.    Consultants/Specialty  None    Code Status  Full code    Disposition  Clinical for now. Potential discharge home tomorrow.     Review of Systems  Review of Systems   Constitutional: Negative for diaphoresis, fever and malaise/fatigue.   Respiratory: Negative for cough, hemoptysis, sputum production, shortness of breath and wheezing.    Cardiovascular: Negative for chest pain, palpitations, orthopnea, claudication, leg swelling and PND.   Gastrointestinal: Negative for abdominal  pain, constipation, diarrhea, nausea and vomiting.   Genitourinary: Negative for dysuria, frequency and urgency.   Musculoskeletal: Positive for joint pain (Left knee, chronic).   Neurological: Negative for dizziness, sensory change, speech change, focal weakness, weakness and headaches.   Psychiatric/Behavioral: Negative for depression. The patient is not nervous/anxious and does not have insomnia.    All other systems reviewed and are negative.     Physical Exam  Temp:  [36.4 °C (97.6 °F)-37.1 °C (98.7 °F)] 36.9 °C (98.5 °F)  Pulse:  [63-77] 72  Resp:  [14-18] 16  BP: (114-147)/(62-76) 147/76  SpO2:  [92 %-98 %] 97 %    Physical Exam  Vitals signs and nursing note reviewed.   Constitutional:       General: She is awake. She is not in acute distress.     Appearance: Normal appearance. She is well-developed and overweight. She is not ill-appearing.   HENT:      Head: Normocephalic and atraumatic.      Mouth/Throat:      Lips: Pink.      Mouth: Mucous membranes are moist.   Eyes:      Conjunctiva/sclera: Conjunctivae normal.      Pupils: Pupils are equal, round, and reactive to light.   Neck:      Musculoskeletal: Normal range of motion and neck supple.   Cardiovascular:      Rate and Rhythm: Normal rate and regular rhythm. Occasional extrasystoles are present.     Pulses: Normal pulses.      Heart sounds: Murmur present.   Pulmonary:      Effort: Pulmonary effort is normal.      Breath sounds: Normal breath sounds.   Abdominal:      General: Bowel sounds are decreased. There is no distension or abdominal bruit.      Palpations: Abdomen is soft.      Tenderness: There is no tenderness.   Musculoskeletal:      Right lower leg: No edema.      Left lower leg: No edema.   Skin:     General: Skin is warm and dry.   Neurological:      General: No focal deficit present.      Mental Status: She is alert and oriented to person, place, and time.      GCS: GCS eye subscore is 4. GCS verbal subscore is 5. GCS motor subscore is  6.      Cranial Nerves: Cranial nerves are intact.      Sensory: Sensation is intact.      Motor: Motor function is intact.      Coordination: Coordination is intact.      Gait: Gait is intact.   Psychiatric:         Attention and Perception: Attention and perception normal.         Mood and Affect: Mood and affect normal.         Speech: Speech normal.         Behavior: Behavior normal. Behavior is cooperative.         Thought Content: Thought content normal.         Cognition and Memory: Cognition and memory normal.         Judgment: Judgment normal.     Fluids    Intake/Output Summary (Last 24 hours) at 1/29/2020 1312  Last data filed at 1/29/2020 1017  Gross per 24 hour   Intake 660 ml   Output no documentation   Net 660 ml     Laboratory  Recent Labs     01/28/20  0105 01/28/20  0822 01/29/20  0202   WBC 2.1* 3.7* 6.9   RBC 3.93* 4.20 3.87*   HEMOGLOBIN 11.3* 12.3 11.1*   HEMATOCRIT 34.3* 36.4* 33.1*   MCV 87.3 86.7 85.5   MCH 28.8 29.3 28.7   MCHC 32.9* 33.8 33.5*   RDW 44.6 44.2 43.6   PLATELETCT 79* 91* 88*   MPV 10.8 11.0 9.6     Recent Labs     01/27/20  1415 01/28/20  0105 01/29/20  0202   SODIUM 138 142 142   POTASSIUM 3.4* 3.6 3.4*   CHLORIDE 106 109 110   CO2 23 23 24   GLUCOSE 150* 262* 125*   BUN 8 12 13   CREATININE 0.73 0.78 0.67   CALCIUM 9.3 8.9 9.2     Imaging  EC-ECHOCARDIOGRAM COMPLETE W/O CONT   Final Result      DX-CHEST-LIMITED (1 VIEW)   Final Result      No acute cardiopulmonary abnormality identified.         Assessment/Plan  Acute exacerbation of chronic obstructive pulmonary disease (COPD) (HCC)- (present on admission)  Assessment & Plan  -Continue azithromycin, RT protocol, prednisone.   -Procal & cxr negative.   -SOB with elevated BNP & murmur.  Echocardiogram without clear cardiac cause.    Acquired circulating anticoagulants (HCC)- (present on admission)  Assessment & Plan  -Unclear about why she is on this. Irregular heart rhythm on exam but an EKG showed PVC's, no a-fib. Review  of records did not lend any help. RN checking with patient to see if she knows why.    -Continue home eliquis. Monitor for bleeding.     Hypertension- (present on admission)  Assessment & Plan  -Controlled on current regimen. Continue home losartan & amlodipine.   -Trend per unit protocol.     Controlled type 2 diabetes mellitus with hyperglycemia, without long-term current use of insulin (HCC)- (present on admission)  Assessment & Plan  -Resume home metformin.  -Continue accuchecks ACHS with SSI as required.   -Hyperglycemia influenced by steroids.   -A1c in process.  -Hypoglycemia protocol in place if needed.     Thrombocytopenia (HCC)- (present on admission)  Assessment & Plan  -Chronic. Stable today. No evidence of bleeding.     Diabetic polyneuropathy associated with type 2 diabetes mellitus (HCC)- (present on admission)  Assessment & Plan  -Chronic & stable. Continue home gabapentin.     Cirrhosis, alcoholic (HCC)- (present on admission)  Assessment & Plan  -History of.      VTE prophylaxis: Eliquis      Electronically signed by:  Kerline Mason, MSN, RN, APRN, ACNPC-AG, CCRN  Nurse Practitioner, [facility]  Work # [phone]    1/29/2020    1:12 PM

## 2020-01-30 ENCOUNTER — PATIENT OUTREACH (OUTPATIENT)
Dept: HEALTH INFORMATION MANAGEMENT | Facility: OTHER | Age: 60
End: 2020-01-30

## 2020-01-30 VITALS
HEIGHT: 68 IN | OXYGEN SATURATION: 96 % | RESPIRATION RATE: 18 BRPM | TEMPERATURE: 98 F | DIASTOLIC BLOOD PRESSURE: 68 MMHG | WEIGHT: 182.32 LBS | HEART RATE: 74 BPM | BODY MASS INDEX: 27.63 KG/M2 | SYSTOLIC BLOOD PRESSURE: 113 MMHG

## 2020-01-30 LAB
ANION GAP SERPL CALC-SCNC: 6 MMOL/L (ref 0–11.9)
BASOPHILS # BLD AUTO: 0.2 % (ref 0–1.8)
BASOPHILS # BLD: 0.01 K/UL (ref 0–0.12)
BUN SERPL-MCNC: 14 MG/DL (ref 8–22)
CALCIUM SERPL-MCNC: 8.9 MG/DL (ref 8.5–10.5)
CHLORIDE SERPL-SCNC: 110 MMOL/L (ref 96–112)
CO2 SERPL-SCNC: 25 MMOL/L (ref 20–33)
CREAT SERPL-MCNC: 0.62 MG/DL (ref 0.5–1.4)
EOSINOPHIL # BLD AUTO: 0.02 K/UL (ref 0–0.51)
EOSINOPHIL NFR BLD: 0.4 % (ref 0–6.9)
ERYTHROCYTE [DISTWIDTH] IN BLOOD BY AUTOMATED COUNT: 45.6 FL (ref 35.9–50)
GLUCOSE BLD-MCNC: 103 MG/DL (ref 65–99)
GLUCOSE SERPL-MCNC: 113 MG/DL (ref 65–99)
HCT VFR BLD AUTO: 33.7 % (ref 37–47)
HGB BLD-MCNC: 11.2 G/DL (ref 12–16)
IMM GRANULOCYTES # BLD AUTO: 0.02 K/UL (ref 0–0.11)
IMM GRANULOCYTES NFR BLD AUTO: 0.4 % (ref 0–0.9)
LYMPHOCYTES # BLD AUTO: 1.14 K/UL (ref 1–4.8)
LYMPHOCYTES NFR BLD: 22.8 % (ref 22–41)
MCH RBC QN AUTO: 28.9 PG (ref 27–33)
MCHC RBC AUTO-ENTMCNC: 33.2 G/DL (ref 33.6–35)
MCV RBC AUTO: 86.9 FL (ref 81.4–97.8)
MONOCYTES # BLD AUTO: 0.3 K/UL (ref 0–0.85)
MONOCYTES NFR BLD AUTO: 6 % (ref 0–13.4)
NEUTROPHILS # BLD AUTO: 3.52 K/UL (ref 2–7.15)
NEUTROPHILS NFR BLD: 70.2 % (ref 44–72)
NRBC # BLD AUTO: 0 K/UL
NRBC BLD-RTO: 0 /100 WBC
PLATELET # BLD AUTO: 97 K/UL (ref 164–446)
PMV BLD AUTO: 10.3 FL (ref 9–12.9)
POTASSIUM SERPL-SCNC: 3.8 MMOL/L (ref 3.6–5.5)
RBC # BLD AUTO: 3.88 M/UL (ref 4.2–5.4)
SODIUM SERPL-SCNC: 141 MMOL/L (ref 135–145)
WBC # BLD AUTO: 5 K/UL (ref 4.8–10.8)

## 2020-01-30 PROCEDURE — A9270 NON-COVERED ITEM OR SERVICE: HCPCS | Performed by: NURSE PRACTITIONER

## 2020-01-30 PROCEDURE — 85025 COMPLETE CBC W/AUTO DIFF WBC: CPT

## 2020-01-30 PROCEDURE — 700102 HCHG RX REV CODE 250 W/ 637 OVERRIDE(OP): Performed by: INTERNAL MEDICINE

## 2020-01-30 PROCEDURE — G0378 HOSPITAL OBSERVATION PER HR: HCPCS

## 2020-01-30 PROCEDURE — 700111 HCHG RX REV CODE 636 W/ 250 OVERRIDE (IP): Performed by: NURSE PRACTITIONER

## 2020-01-30 PROCEDURE — 82962 GLUCOSE BLOOD TEST: CPT

## 2020-01-30 PROCEDURE — 36415 COLL VENOUS BLD VENIPUNCTURE: CPT

## 2020-01-30 PROCEDURE — 99217 PR OBSERVATION CARE DISCHARGE: CPT | Performed by: HOSPITALIST

## 2020-01-30 PROCEDURE — A9270 NON-COVERED ITEM OR SERVICE: HCPCS | Performed by: INTERNAL MEDICINE

## 2020-01-30 PROCEDURE — 80048 BASIC METABOLIC PNL TOTAL CA: CPT

## 2020-01-30 PROCEDURE — 700102 HCHG RX REV CODE 250 W/ 637 OVERRIDE(OP): Performed by: NURSE PRACTITIONER

## 2020-01-30 RX ORDER — GUAIFENESIN 1200 MG/1
1200 TABLET, EXTENDED RELEASE ORAL 2 TIMES DAILY
Qty: 10 TAB | Refills: 0 | Status: SHIPPED | OUTPATIENT
Start: 2020-01-30 | End: 2020-02-04

## 2020-01-30 RX ORDER — AZITHROMYCIN 250 MG/1
250 TABLET, FILM COATED ORAL DAILY
Qty: 1 TAB | Refills: 0 | Status: SHIPPED | OUTPATIENT
Start: 2020-01-31 | End: 2020-02-01

## 2020-01-30 RX ORDER — PREDNISONE 20 MG/1
40 TABLET ORAL DAILY
Qty: 6 TAB | Refills: 0 | Status: SHIPPED | OUTPATIENT
Start: 2020-01-30 | End: 2020-02-02

## 2020-01-30 RX ADMIN — AZITHROMYCIN 500 MG: 250 TABLET, FILM COATED ORAL at 05:59

## 2020-01-30 RX ADMIN — APIXABAN 5 MG: 5 TABLET, FILM COATED ORAL at 05:59

## 2020-01-30 RX ADMIN — GABAPENTIN 300 MG: 300 CAPSULE ORAL at 05:59

## 2020-01-30 RX ADMIN — CETIRIZINE HYDROCHLORIDE 10 MG: 10 TABLET, FILM COATED ORAL at 06:00

## 2020-01-30 RX ADMIN — OXYCODONE HYDROCHLORIDE 5 MG: 5 TABLET ORAL at 06:04

## 2020-01-30 RX ADMIN — SENNOSIDES AND DOCUSATE SODIUM 2 TABLET: 8.6; 5 TABLET ORAL at 05:58

## 2020-01-30 RX ADMIN — LOSARTAN POTASSIUM 100 MG: 50 TABLET, FILM COATED ORAL at 05:59

## 2020-01-30 RX ADMIN — OXYCODONE HYDROCHLORIDE 5 MG: 5 TABLET ORAL at 11:12

## 2020-01-30 RX ADMIN — AMLODIPINE BESYLATE 5 MG: 5 TABLET ORAL at 05:59

## 2020-01-30 RX ADMIN — PREDNISONE 40 MG: 20 TABLET ORAL at 05:59

## 2020-01-30 RX ADMIN — GUAIFENESIN 1200 MG: 600 TABLET, EXTENDED RELEASE ORAL at 05:58

## 2020-01-30 RX ADMIN — Medication 400 MG: at 05:58

## 2020-01-30 NOTE — DISCHARGE SUMMARY
Discharge Summary    CHIEF COMPLAINT ON ADMISSION  Chief Complaint   Patient presents with   • Cough     x1 week   • Sent by MD     Seen at [facility] and sent for pneumonia after 2 view CXR.      Reason for Admission  Productive cough, sent by [place] [facility]     Admission Date  1/27/2020    CODE STATUS  Full Code    HPI & HOSPITAL COURSE  Ms. Alicia is a 59-year-old female who presented to the emergency department on 1/27/2020 with worsening productive cough x2 weeks accompanied by fevers and chills.  She initially presented to her [place] clinic 1 week ago and was given amoxicillin but returned to the clinic just prior to ED admission where a chest x-ray was performed and indicated that she had pneumonia.  She was subsequently referred here where she was also found to have a COPD exacerbation.  She was started on oral azithromycin, RT protocol, systemic steroids, and admitted to the hospital for further monitoring and treatment. A chest xray was repeated and was negative. Procalcitonin levels were also normal. An echocardiogram was done to rule out cardiac causes and was largely unremarkable, showing only mild TR & MR with a normal ejection fraction. She was quickly weaned off oxygen, which she has tolerated well, and on the day of discharge is comfortable and with normal vital signs and lab work.     Therefore, she is discharged in good and stable condition to home with close outpatient follow-up.    Discharge Date  1/30/2020    FOLLOW UP ITEMS POST DISCHARGE  [place] clinic within 1 week.     DISCHARGE DIAGNOSES  Active Problems:    Acute exacerbation of chronic obstructive pulmonary disease (COPD) (HCC) POA: Yes    Acquired circulating anticoagulants (HCC) POA: Yes    Cirrhosis, alcoholic (HCC) POA: Yes    Diabetic polyneuropathy associated with type 2 diabetes mellitus (HCC) POA: Yes      Overview: - Continue gabapentin for patient's neuropathic symptoms.    Thrombocytopenia (HCC) POA: Yes     Controlled type 2 diabetes mellitus with hyperglycemia, without long-term current use of insulin (HCC) (Chronic) POA: Yes    Hypertension (Chronic) POA: Yes  Resolved Problems:    Leukopenia POA: Yes    FOLLOW UP  Future Appointments   Date Time Provider Department Center   6/4/2020  9:15 AM [facility] [address]     Juanita King, N.MAXIMINO.  [address] [place] [address]  [phone]      [facility]  called office and left a voicemail requesting office to call to schedule your appointment . If you do not hear from them please call to schedule your appointment. Thank you    MEDICATIONS ON DISCHARGE     Medication List      Start taking these medications      Instructions   azithromycin 250 MG Tabs  Start taking on:  January 31, 2020  Commonly known as:  ZITHROMAX   Take 1 Tab by mouth every day for 1 day.  Dose:  250 mg     Guaifenesin 1200 MG Tb12   Take 1 Tab by mouth 2 Times a Day for 5 days.  Dose:  1,200 mg     predniSONE 20 MG Tabs  Commonly known as:  DELTASONE   Take 2 Tabs by mouth every day for 3 days.  Dose:  40 mg        Continue taking these medications      Instructions   albuterol 108 (90 Base) MCG/ACT Aers inhalation aerosol   Inhale 2 Puffs by mouth every 6 hours as needed for Shortness of Breath.  Dose:  2 Puff     amLODIPine 5 MG Tabs  Commonly known as:  NORVASC   Take 5 mg by mouth every day.  Dose:  5 mg     cetirizine 10 MG Tabs  Commonly known as:  ZYRTEC   Take 10 mg by mouth every day.  Dose:  10 mg     docusate sodium 100 MG Caps  Commonly known as:  COLACE   Take 100 mg by mouth 2 times a day.  Dose:  100 mg     Eliquis 5mg Tabs  Generic drug:  apixaban   Take 5 mg by mouth 2 Times a Day.  Dose:  5 mg     gabapentin 300 MG Caps  Commonly known as:  NEURONTIN   Take 300 mg by mouth 2 Times a Day.  Dose:  300 mg     losartan 100 MG Tabs  Commonly known as:  COZAAR   Take 100 mg by mouth every day.  Dose:  100 mg     magnesium oxide 400 MG Tabs tablet  Commonly known as:   MAG-OX   Take 400 mg by mouth every day.  Dose:  400 mg     metFORMIN 850 MG Tabs  Commonly known as:  GLUCOPHAGE   Take 850 mg by mouth every evening.  Dose:  850 mg     oxyCODONE immediate-release 5 MG Tabs  Commonly known as:  ROXICODONE   Take 5 mg by mouth every four hours as needed for Severe Pain.  Dose:  5 mg        Stop taking these medications    felodipine 10 MG Tb24  Commonly known as:  PLENDIL     oxyCODONE-acetaminophen  MG Tabs  Commonly known as:  PERCOCET-10          Allergies  Allergies   Allergen Reactions   • Nkda [No Known Drug Allergy]      DIET  Orders Placed This Encounter   Procedures   • Diet Order Diabetic     Standing Status:   Standing     Number of Occurrences:   1     Order Specific Question:   Diet:     Answer:   Diabetic [3]     ACTIVITY  As tolerated.  Exercise encouraged.  Weight bearing as tolerated    CONSULTATIONS  None    PROCEDURES  None    LABORATORY  Lab Results   Component Value Date    SODIUM 141 01/30/2020    POTASSIUM 3.8 01/30/2020    CHLORIDE 110 01/30/2020    CO2 25 01/30/2020    GLUCOSE 113 (H) 01/30/2020    BUN 14 01/30/2020    CREATININE 0.62 01/30/2020    CREATININE 0.9 03/12/2009      Lab Results   Component Value Date    WBC 5.0 01/30/2020    HEMOGLOBIN 11.2 (L) 01/30/2020    HEMATOCRIT 33.7 (L) 01/30/2020    PLATELETCT 97 (L) 01/30/2020      Total time of the discharge process exceeds 32 minutes.      Electronically signed by:  Kerline Mason, MSN, RN, APRN, ACNPC-AG, CCRN  Nurse Practitioner, [facility]  Work # [phone]    1/30/2020    10:11 AM

## 2020-01-30 NOTE — CARE PLAN
Problem: Safety  Goal: Will remain free from injury  Outcome: PROGRESSING AS EXPECTED  Note:   Pt up SBA  fww, fww out of sight, pt reminded to call prior to getting oob, bed alarm on, bed in lowest and locked position, nonskid socks on.      Problem: Discharge Barriers/Planning  Goal: Patient's continuum of care needs will be met  Outcome: PROGRESSING AS EXPECTED  Note:   Awaiting for meds to beds; pt feels ready to d/c home

## 2020-01-30 NOTE — DISCHARGE PLANNING
Medication reconcilliation completed. Medications delivered to patient at bedside. Patient counseled.       April Alicia   Home Medication Instructions CHEMA:[id number]    Printed on:01/30/20 1117   Medication Information                      azithromycin (ZITHROMAX) 250 MG Tab  Take 1 Tab by mouth every day for 1 day.             guaiFENesin ER 1200 MG TABLET SR 12 HR  Take 1 Tab by mouth 2 Times a Day for 5 days.             predniSONE (DELTASONE) 20 MG Tab  Take 2 Tabs by mouth every day for 3 days.

## 2020-01-30 NOTE — PROGRESS NOTES
Assumed care at 0700, report received from NOC RN.  Pt A&O x 4, states pain is 4/10-- declines any intervention. Bed locked, 2 rails up, bed in lowest position. Call light in place, belongings at bedside, no needs at this time and hourly rounding in place.  Pt up SBA to bathroom with FWW, unsteady gait. Pt to dc home today. Scheduling and Meds to Beds notified.

## 2020-01-30 NOTE — PROGRESS NOTES
Assumed care at 1900.  Patient alert and oriented.  Up ad phuc.  No new complaints.  Resting in bed.

## 2020-01-30 NOTE — DISCHARGE INSTRUCTIONS
Discharge Instructions    Discharged to home by car with relative. Discharged via wheelchair, hospital escort: Yes.  Special equipment needed: Not Applicable    Be sure to schedule a follow-up appointment with your primary care doctor or any specialists as instructed.     Discharge Plan:   Diet Plan: Discussed  Activity Level: Discussed  Smoking Cessation Offered: Patient Refused  Confirmed Follow up Appointment: Patient to Call and Schedule Appointment  Confirmed Symptoms Management: Discussed  Medication Reconciliation Updated: Yes  Influenza Vaccine Indication: Not indicated: Previously immunized this influenza season and > 8 years of age    I understand that a diet low in cholesterol, fat, and sodium is recommended for good health. Unless I have been given specific instructions below for another diet, I accept this instruction as my diet prescription.   Other diet: Regular    Special Instructions: None    · Is patient discharged on Warfarin / Coumadin?   No     Depression / Suicide Risk    As you are discharged from this [facility] facility, it is important to learn how to keep safe from harming yourself.    Recognize the warning signs:  · Abrupt changes in personality, positive or negative- including increase in energy   · Giving away possessions  · Change in eating patterns- significant weight changes-  positive or negative  · Change in sleeping patterns- unable to sleep or sleeping all the time   · Unwillingness or inability to communicate  · Depression  · Unusual sadness, discouragement and loneliness  · Talk of wanting to die  · Neglect of personal appearance   · Rebelliousness- reckless behavior  · Withdrawal from people/activities they love  · Confusion- inability to concentrate     If you or a loved one observes any of these behaviors or has concerns about self-harm, here's what you can do:  · Talk about it- your feelings and reasons for harming yourself  · Remove any means that you might use to hurt  yourself (examples: pills, rope, extension cords, firearm)  · Get professional help from the community (Mental Health, Substance Abuse, psychological counseling)  · Do not be alone:Call your Safe Contact- someone whom you trust who will be there for you.  · Call your local CRISIS HOTLINE 163-5142 or 373-489-0707  · Call your local Children's Mobile Crisis Response Team [place] (655) 756-5121 or www.[employer]  · Call the toll free National Suicide Prevention Hotlines   · National Suicide Prevention Lifeline 588-759-TWIA (0578)  · National Hope Line Network 800-SUICIDE (313-8477)

## 2020-02-03 ENCOUNTER — PATIENT OUTREACH (OUTPATIENT)
Dept: HEALTH INFORMATION MANAGEMENT | Facility: OTHER | Age: 60
End: 2020-02-03

## 2020-02-20 ENCOUNTER — HOSPITAL ENCOUNTER (OUTPATIENT)
Dept: PULMONOLOGY | Facility: MEDICAL CENTER | Age: 60
End: 2020-02-20
Attending: NURSE PRACTITIONER
Payer: MEDICAID

## 2020-02-20 PROCEDURE — 94726 PLETHYSMOGRAPHY LUNG VOLUMES: CPT

## 2020-02-20 PROCEDURE — 94729 DIFFUSING CAPACITY: CPT

## 2020-02-20 PROCEDURE — 94729 DIFFUSING CAPACITY: CPT | Mod: 26 | Performed by: INTERNAL MEDICINE

## 2020-02-20 PROCEDURE — 94060 EVALUATION OF WHEEZING: CPT | Mod: 26 | Performed by: INTERNAL MEDICINE

## 2020-02-20 PROCEDURE — 94060 EVALUATION OF WHEEZING: CPT

## 2020-02-20 PROCEDURE — 94726 PLETHYSMOGRAPHY LUNG VOLUMES: CPT | Mod: 26 | Performed by: INTERNAL MEDICINE

## 2020-02-20 ASSESSMENT — PULMONARY FUNCTION TESTS
FVC_PREDICTED: 3.43
FEV1/FVC_PERCENT_LLN: 66.13
FEV1_PREDICTED: 2.7
FVC_LLN: 2.86
FEV1/FVC: 79.90
FEV1_PERCENT_CHANGE: 2
FEV1/FVC: 81.95
FEV1/FVC_PERCENT_PREDICTED: 102
FVC: 3.99
FEV1_LLN: 2.25
FEV1/FVC: 81.85
FEV1/FVC_PERCENT_PREDICTED: 79
FEV1_PERCENT_PREDICTED: 121
FEV1/FVC_PERCENT_PREDICTED: 104
FEV1/FVC_PERCENT_LLN: 66.13
FVC: 3.99
FEV1_PERCENT_CHANGE: 0
FEV1/FVC_PERCENT_PREDICTED: 100
FEV1/FVC: 80
FEV1_PERCENT_PREDICTED: 118
FEV1/FVC_PERCENT_PREDICTED: 103
FEV1/FVC_PERCENT_CHANGE: 2
FEV1/FVC_PREDICTED: 79.19
FVC_LLN: 2.86
FEV1_LLN: 2.25
FVC_PERCENT_PREDICTED: 116
FEV1: 3.27
FEV1: 3.19
FVC_PERCENT_PREDICTED: 116

## 2020-02-23 NOTE — PROCEDURES
PULMONARY FUNCTION TEST INTERPRETATION    REQUESTING PROVIDER:  Juantia King NP    REASON FOR REQUEST:  COPD.    FINDINGS:  1.  Acceptable and reproducible.  2.  FEV1 3.19 liters (118%), FVC 3.99 liters (116%), ratio 79.9%.  3.  Flow volume loops normal appearing.  4.  TLC 6.3 liters (117%).  5.  DLCO 27.49 mL/min/mmHg (126%).    IMPRESSION:  Normal pulmonary function testing with no response to   bronchodilator.       ____________________________________     MD WEN Ramirez / SABINE    DD:  02/23/2020 12:16:00  DT:  02/23/2020 12:32:10    D#:  [id number]  Job#:  [id number]    cc: Juanita King NP

## 2020-03-09 ENCOUNTER — APPOINTMENT (OUTPATIENT)
Dept: PHYSICAL THERAPY | Facility: REHABILITATION | Age: 60
End: 2020-03-09
Attending: NURSE PRACTITIONER
Payer: MEDICAID

## 2020-03-09 NOTE — OP THERAPY EVALUATION
Outpatient Physical Therapy  INITIAL EVALUATION    [facility] [address]  [address]  [place] [address]  Phone:  [phone]  Fax:  [phone]    Date of Evaluation: 03/09/2020    Patient: April Alicia  YOB: 1960  MRN: [id number]     Referring Provider: Juanita King N.P.  [address] [place] [place] [address]   Referring Diagnosis Osteoarthritis of knee, unspecified [M17.9]     Time Calculation               Physical Therapy Occurrence Codes    Date physical therapy care plan established or reviewed:  3/9/20   Date physical therapy treatment started:  3/9/20          Chief Complaint: No chief complaint on file.    Visit Diagnoses     ICD-10-CM   1. Osteoarthritis of knee, unspecified M17.9         Subjective    Past Medical History:   Diagnosis Date   • Arthritis    • ASTHMA    • Diabetes    • Heart abnormality     leakage in left valve   • Heart valve disease    • Hypertension    • Infection 9/4/2017   • Liver disease    • Seizure disorder (HCC)      Past Surgical History:   Procedure Laterality Date   • IRRIGATION & DEBRIDEMENT ORTHO Right 9/3/2017    Procedure: IRRIGATION & DEBRIDEMENT ORTHO, POLY EXCHANGE;  Surgeon: Jerome Russell M.D.;  Location: SURGERY [facility];  Service: Orthopedics   • COLONOSCOPY WITH CLIPPING  10/28/2015    Procedure: COLONOSCOPY WITH CLIPPING;  Surgeon: Ruben Colon M.D.;  Location: ENDOSCOPY [facility];  Service:    • COLONOSCOPY WITH SCLEROTHERAPY  10/28/2015    Procedure: COLONOSCOPY WITH SCLEROTHERAPY;  Surgeon: Ruben Colon M.D.;  Location: ENDOSCOPY [facility];  Service:    • COLONOSCOPY WITH TATTOOING  10/28/2015    Procedure: COLONOSCOPY WITH TATTOOING;  Surgeon: Ruben Colon M.D.;  Location: ENDOSCOPY [facility];  Service:    • GYN SURGERY  2003    hysteroscoopy   • GYN SURGERY  1982    tubal ligation     Social History     Tobacco Use   • Smoking status: Former Smoker     Last  attempt to quit: 12/29/2016     Years since quitting: 3.1   • Smokeless tobacco: Never Used   • Tobacco comment: 10/2010   Substance Use Topics   • Alcohol use: No     Family and Occupational History     Socioeconomic History   • Marital status: Single     Spouse name: Not on file   • Number of children: Not on file   • Years of education: Not on file   • Highest education level: Not on file   Occupational History   • Not on file       Objective    Exercises/Treatment  Time-based treatments/modalities:          Assessment/Response/Plan    Functional Assessment Used        Referring provider co-signature:  I have reviewed this plan of care and my co-signature certifies the need for services.  Certification Dates:   From 03/09/20   To 05/04/20    Physician Signature: ________________________________ Date: ______________

## 2020-06-08 ENCOUNTER — HOSPITAL ENCOUNTER (OUTPATIENT)
Facility: MEDICAL CENTER | Age: 60
End: 2020-06-08
Attending: ORTHOPAEDIC SURGERY | Admitting: ORTHOPAEDIC SURGERY
Payer: MEDICAID

## 2020-06-16 ENCOUNTER — APPOINTMENT (OUTPATIENT)
Dept: ADMISSIONS | Facility: MEDICAL CENTER | Age: 60
End: 2020-06-16
Payer: MEDICAID

## 2020-06-24 ENCOUNTER — APPOINTMENT (OUTPATIENT)
Dept: ADMISSIONS | Facility: MEDICAL CENTER | Age: 60
End: 2020-06-24
Payer: MEDICAID

## 2020-06-25 ENCOUNTER — APPOINTMENT (OUTPATIENT)
Dept: ADMISSIONS | Facility: MEDICAL CENTER | Age: 60
End: 2020-06-25
Payer: MEDICAID

## 2020-07-09 ENCOUNTER — APPOINTMENT (OUTPATIENT)
Dept: RADIOLOGY | Facility: MEDICAL CENTER | Age: 60
End: 2020-07-09
Attending: EMERGENCY MEDICINE
Payer: MEDICAID

## 2020-07-09 ENCOUNTER — OFFICE VISIT (OUTPATIENT)
Dept: ADMISSIONS | Facility: MEDICAL CENTER | Age: 60
End: 2020-07-09
Attending: ORTHOPAEDIC SURGERY
Payer: MEDICAID

## 2020-07-09 ENCOUNTER — HOSPITAL ENCOUNTER (EMERGENCY)
Facility: MEDICAL CENTER | Age: 60
End: 2020-07-09
Attending: EMERGENCY MEDICINE
Payer: MEDICAID

## 2020-07-09 VITALS
OXYGEN SATURATION: 96 % | HEIGHT: 68 IN | SYSTOLIC BLOOD PRESSURE: 139 MMHG | RESPIRATION RATE: 16 BRPM | BODY MASS INDEX: 27.9 KG/M2 | HEART RATE: 60 BPM | TEMPERATURE: 98.2 F | DIASTOLIC BLOOD PRESSURE: 85 MMHG | WEIGHT: 184.08 LBS

## 2020-07-09 VITALS — WEIGHT: 179.01 LBS | HEIGHT: 68 IN | BODY MASS INDEX: 27.13 KG/M2

## 2020-07-09 DIAGNOSIS — S22.32XA CLOSED FRACTURE OF ONE RIB OF LEFT SIDE, INITIAL ENCOUNTER: ICD-10-CM

## 2020-07-09 DIAGNOSIS — Z01.812 PRE-OPERATIVE LABORATORY EXAMINATION: ICD-10-CM

## 2020-07-09 DIAGNOSIS — R07.89 CHEST WALL PAIN: ICD-10-CM

## 2020-07-09 LAB
COVID ORDER STATUS COVID19: NORMAL
EKG IMPRESSION: NORMAL

## 2020-07-09 PROCEDURE — 93005 ELECTROCARDIOGRAM TRACING: CPT | Performed by: EMERGENCY MEDICINE

## 2020-07-09 PROCEDURE — 99283 EMERGENCY DEPT VISIT LOW MDM: CPT

## 2020-07-09 PROCEDURE — 71101 X-RAY EXAM UNILAT RIBS/CHEST: CPT | Mod: LT

## 2020-07-09 PROCEDURE — 93005 ELECTROCARDIOGRAM TRACING: CPT

## 2020-07-09 PROCEDURE — U0003 INFECTIOUS AGENT DETECTION BY NUCLEIC ACID (DNA OR RNA); SEVERE ACUTE RESPIRATORY SYNDROME CORONAVIRUS 2 (SARS-COV-2) (CORONAVIRUS DISEASE [COVID-19]), AMPLIFIED PROBE TECHNIQUE, MAKING USE OF HIGH THROUGHPUT TECHNOLOGIES AS DESCRIBED BY CMS-2020-01-R: HCPCS

## 2020-07-09 RX ORDER — ASCORBIC ACID 500 MG
500 TABLET ORAL DAILY
Status: SHIPPED | COMMUNITY
Start: 2020-07-08 | End: 2022-07-10

## 2020-07-09 RX ORDER — GABAPENTIN 400 MG/1
400 CAPSULE ORAL 2 TIMES DAILY
Status: SHIPPED | COMMUNITY
Start: 2020-07-08 | End: 2023-07-06

## 2020-07-09 RX ORDER — SUCCINYLCHOLINE CHLORIDE 20 MG/ML
INJECTION INTRAMUSCULAR; INTRAVENOUS
Status: SHIPPED | COMMUNITY
End: 2020-07-09

## 2020-07-09 RX ORDER — POTASSIUM CHLORIDE 750 MG/1
10 TABLET, FILM COATED, EXTENDED RELEASE ORAL DAILY
Status: SHIPPED | COMMUNITY
Start: 2020-07-08 | End: 2022-01-25

## 2020-07-09 RX ORDER — TRANEXAMIC ACID 100 MG/ML
INJECTION, SOLUTION INTRAVENOUS
Status: SHIPPED | COMMUNITY
End: 2020-07-09

## 2020-07-09 RX ORDER — LIDOCAINE 50 MG/G
PATCH TOPICAL
Status: SHIPPED | COMMUNITY
Start: 2020-07-08 | End: 2020-07-09

## 2020-07-09 RX ORDER — CELECOXIB 100 MG/1
100 CAPSULE ORAL DAILY
Status: SHIPPED | COMMUNITY
End: 2020-07-09

## 2020-07-09 RX ORDER — METFORMIN HYDROCHLORIDE 500 MG/1
500 TABLET, EXTENDED RELEASE ORAL EVERY EVENING
Status: ON HOLD | COMMUNITY
Start: 2020-07-08 | End: 2022-12-27

## 2020-07-09 RX ORDER — DIAPER,BRIEF,INFANT-TODD,DISP
1 EACH MISCELLANEOUS 2 TIMES DAILY PRN
Status: SHIPPED | COMMUNITY
Start: 2020-07-08 | End: 2020-09-13

## 2020-07-09 RX ORDER — LANOLIN ALCOHOL/MO/W.PET/CERES
1 CREAM (GRAM) TOPICAL 2 TIMES DAILY PRN
Status: SHIPPED | COMMUNITY
Start: 2020-07-08 | End: 2020-09-13

## 2020-07-09 RX ORDER — DRONEDARONE 400 MG/1
400 TABLET, FILM COATED ORAL 2 TIMES DAILY WITH MEALS
Status: ON HOLD | COMMUNITY
End: 2022-12-27

## 2020-07-09 RX ORDER — LIDOCAINE AND PRILOCAINE 25; 25 MG/G; MG/G
CREAM TOPICAL
Status: SHIPPED | COMMUNITY
Start: 2020-07-08 | End: 2020-07-09

## 2020-07-09 RX ORDER — IBUPROFEN 600 MG/1
TABLET ORAL
Status: SHIPPED | COMMUNITY
Start: 2020-07-08 | End: 2020-07-09

## 2020-07-09 RX ORDER — FLUTICASONE PROPIONATE 50 MCG
SPRAY, SUSPENSION (ML) NASAL
Status: SHIPPED | COMMUNITY
Start: 2020-04-28 | End: 2020-07-09

## 2020-07-09 RX ORDER — FELODIPINE 5 MG/1
5 TABLET, EXTENDED RELEASE ORAL EVERY EVENING
Status: ON HOLD | COMMUNITY
Start: 2020-07-08 | End: 2023-01-10

## 2020-07-09 RX ORDER — TIOTROPIUM BROMIDE 18 UG/1
18 CAPSULE ORAL; RESPIRATORY (INHALATION) DAILY
Status: SHIPPED | COMMUNITY
End: 2022-07-10

## 2020-07-09 RX ORDER — GUAIFENESIN 1200 MG/1
TABLET, EXTENDED RELEASE ORAL
Status: SHIPPED | COMMUNITY
End: 2020-07-09

## 2020-07-09 RX ORDER — PANTOPRAZOLE SODIUM 40 MG/1
TABLET, DELAYED RELEASE ORAL
Status: SHIPPED | COMMUNITY
Start: 2020-07-08 | End: 2020-07-09

## 2020-07-09 RX ORDER — ONDANSETRON 4 MG/1
TABLET, FILM COATED ORAL
Status: SHIPPED | COMMUNITY
Start: 2020-07-08 | End: 2020-07-09

## 2020-07-09 RX ORDER — BUPIVACAINE HYDROCHLORIDE AND EPINEPHRINE 5; 5 MG/ML; UG/ML
INJECTION, SOLUTION EPIDURAL; INTRACAUDAL; PERINEURAL
Status: SHIPPED | COMMUNITY
End: 2020-07-09

## 2020-07-09 ASSESSMENT — FIBROSIS 4 INDEX
FIB4 SCORE: 3.39
FIB4 SCORE: 3.39

## 2020-07-09 NOTE — ED NOTES
Patient verbalized understanding to plan of care and discharge information. Patient in stable condition. No signs of distress. Patient pain free. Patient wheeled out of ED to pre admit per request. Patient leaving with her crutches from home.

## 2020-07-09 NOTE — ED NOTES
Med Rec completed per patient   Allergies reviewed  No ORAL antibiotics in last 14 days    Patient stated she D/C'd Eliquis Sunday because she is having surgery 7-13-20

## 2020-07-09 NOTE — ED PROVIDER NOTES
ED Provider Note    CHIEF COMPLAINT  Chief Complaint   Patient presents with   • GLF     monday, crutch slipped and fell onto L side   • Chest Wall Pain       HPI  April Alicia is a 60 y.o. female who presents sent from preop for left-sided chest pain.  [place]utches because she has a bad knee and she was in the bathroom at the casino and her crutch slipped and she fell.  The crutch went into the left side of her lower ribs and she has been having pain secondary to that since Monday.  She went to preop for her knee today and was sent here because of this chest pain.  She says she does not feel like it is of broken ribs she has had 1 of those before.  She just has pain when she moves or takes a deep breath.  She denies any fevers or cough.  She also reports some wrist pain but is able to use her wrist and has no deformities.      REVIEW OF SYSTEMS  Positive for left-sided rib pain, negative for shortness of breath.     PAST MEDICAL HISTORY   has a past medical history of Arthritis, ASTHMA, Diabetes, Heart abnormality, Heart valve disease, Hypertension, Infection (9/4/2017), Liver disease, and Seizure disorder (HCC).    SOCIAL HISTORY  Social History     Tobacco Use   • Smoking status: Former Smoker     Last attempt to quit: 12/29/2016     Years since quitting: 3.5   • Smokeless tobacco: Never Used   • Tobacco comment: 10/2010   Substance and Sexual Activity   • Alcohol use: No   • Drug use: No   • Sexual activity: Not on file       SURGICAL HISTORY   has a past surgical history that includes gyn surgery (1982); gyn surgery (2003); colonoscopy with clipping (10/28/2015); colonoscopy with sclerotherapy (10/28/2015); colonoscopy with tattooing (10/28/2015); and irrigation & debridement ortho (Right, 9/3/2017).    CURRENT MEDICATIONS  Home Medications     Reviewed by Demetrius Kam (Pharmacy Tech) on 07/09/20 at 1138  Med List Status: Complete   Medication  "Last Dose Status   albuterol (VENTOLIN OR PROVENTIL) 108 (90 BASE) MCG/ACT Aero Soln inhalation aerosol 7/8/2020 Active   apixaban (ELIQUIS) 5mg Tab 7/5/2020 Active   ascorbic acid (ASCORBIC ACID) 500 MG Tab 7/9/2020 Active   docusate sodium (COLACE) 100 MG Cap 7/9/2020 Active   felodipine (PLENDIL) 5 MG TABLET SR 24 HR 7/8/2020 Active   gabapentin (NEURONTIN) 400 MG Cap 7/9/2020 Active   hydrocortisone 1 % Ointment 7/8/2020 Active   losartan (COZAAR) 100 MG Tab 7/9/2020 Active   magnesium oxide (MAG-OX) 400 MG Tab 7/9/2020 Active   metFORMIN ER (GLUCOPHAGE XR) 500 MG TABLET SR 24 HR 7/8/2020 Active   MULTAQ 400 MG Tab 7/9/2020 Active   oxyCODONE immediate-release (ROXICODONE) 5 MG Tab 7/9/2020 Active   oyster shell calcium/D (OS-DAILY) 500-200 MG-UNIT Tab 7/9/2020 Active   potassium chloride ER (KLOR-CON) 10 MEQ tablet 7/9/2020 Active   Skin Protectants, Misc. (MINERIN) Cream 7/8/2020 Active   tiotropium (SPIRIVA HANDIHALER) 18 MCG Cap 7/9/2020 Active                ALLERGIES  Allergies   Allergen Reactions   • Nkda [No Known Drug Allergy]        PHYSICAL EXAM  VITAL SIGNS: /85   Pulse 60   Temp 36.8 °C (98.2 °F) (Temporal)   Resp 16   Ht 1.727 m (5' 8\")   Wt 83.5 kg (184 lb 1.4 oz)   LMP 06/02/2008   SpO2 96%   BMI 27.99 kg/m²   Constitutional: Alert in no apparent distress.  HENT: Normocephalic, Atraumatic, Bilateral external ears normal. Nose normal.   Eyes: Pupils are equal and reactive. Conjunctiva normal, non-icteric.   Heart: Regular rate and rythm, no murmurs.    Lungs: Clear to auscultation bilaterally.  Mild tenderness on her left lower anterior ribs  Skin: Warm, Dry, No erythema, No rash.   Neurologic: Alert, Grossly non-focal.   Psychiatric: Affect normal, Judgment normal, Mood normal, Appears appropriate and not intoxicated.   Extremities: Intact distal pulses, No edema, No tenderness    DIAGNOSTIC STUDIES / PROCEDURES  Results for orders placed or performed during the hospital encounter of " 20   EKG   Result Value Ref Range    Report       [facility].    Test Date:  2020  Pt Name:    FRANCISCA TURNER          Department: EDSM  MRN:        [id number]                      Room:  Gender:     Female                       Technician: [id number]  :        1960                   Requested By:ER TRIAGE PROTOCOL  Order #:    [id number]                    Reading MD: SEDA THURSTON    Measurements  Intervals                                Axis  Rate:       54                           P:          48  MA:         149                          QRS:        -14  QRSD:       113                          T:          10  QT:         482  QTc:        457    Interpretive Statements  Sinus rhythm  Probable left ventricular hypertrophy  Borderline T abnormalities, anterior leads  Compared to ECG 2020 02:06:13  unchanged.  no evidence of ischemia  Electronically Signed On 2020 11:00:03 PDT by SEDA THURSTON       FX-DILC-SLRVBXOTLO (WITH 1-VIEW CXR) LEFT   Final Result      Mildly displaced fracture of the left anterior sixth rib            COURSE & MEDICAL DECISION MAKING  Pertinent Labs & Imaging studies reviewed. (See chart for details)    This is a 60-year-old that presents for left-sided chest pain.  It does seem quite musculoskeletal given the history of the fall.  I do not think this is cardiac in nature.  Chest x-ray will be performed to evaluate for any rib fractures or pneumonia.    Patient does have evidence of a mildly displaced fracture of the left anterior sixth rib.  I do think this explains her pain.  She has adequate pain control as she takes oxycodone for her knee.  Her oxygen saturation is normal there is no evidence of pneumonia she will be discharged.     The patient will return for new or worsening symptoms and is stable at the time of discharge. Patient was given return precautions. Patient and/or family member verbalizes understanding and  will comply.    DISPOSITION:  Patient will be discharged home in stable condition.    FOLLOW UP:  Juanita King N.P.  [address] [id number] [phone]      As needed    [facility], Emergency Dept  [address]  [place] [id number] [phone]    Return for worsening pain, shortness of breath or other concerns        OUTPATIENT MEDICATIONS:  Discharge Medication List as of 7/9/2020 12:14 PM          Your blood pressure is elevated here in the emergency department. Please monitor your blood pressure over the next several days. If your blood pressure continues to be 120/80 or higher please contact your physician for blood pressure management.       FINAL IMPRESSION  1. Chest wall pain     2. Closed fracture of one rib of left side, initial encounter         2.   3.     This dictation has been creating using voice recognition software. The accuracy of the dictation is limited the abilities of the software.  I expect there may be some errors of grammar and possibly content. I made every attempt to manually correct the errors within my dictation. However errors related to this voice recognition software may still exist and should be interpreted within the appropriate context.        The note accurately reflects work and decisions made by me.  Poonam Mai M.D.  7/9/2020  4:44 PM

## 2020-07-09 NOTE — OR NURSING
Pt arrived for PAT appt. Began to c/o chest pain. Asked if SOB and states yes. Pt told we would take her to the ED and reschedule this appointment. Then states she was not SOB. Thinks the chest pain is r/t to a recent fall where she hit her ribs. To ED in W/C with Dodie MONTAÑO accompanying pt.LM at  Dr Faustin office

## 2020-07-09 NOTE — ED TRIAGE NOTES
Pt to triage in wheelchair  Sent from pre op check in for chest pain  Chief Complaint   Patient presents with   • Chest Pain     L side since wednesday   • GLF     monday, crutch slipped and fell onto L side   • Wrist Pain   Pt A & 0 x 4, speech clear  COVID-19 screening criteria completed, pt denies high risk travel and denies contact with COVID-19 positive pt  Pt updated on triage process and asked to inform RN of any changes while waiting in lobby.

## 2020-07-10 ENCOUNTER — APPOINTMENT (OUTPATIENT)
Dept: ADMISSIONS | Facility: MEDICAL CENTER | Age: 60
End: 2020-07-10
Attending: ORTHOPAEDIC SURGERY
Payer: MEDICAID

## 2020-07-10 LAB
SARS-COV-2 RNA RESP QL NAA+PROBE: NOTDETECTED
SPECIMEN SOURCE: NORMAL

## 2020-07-16 ENCOUNTER — APPOINTMENT (OUTPATIENT)
Dept: PHYSICAL THERAPY | Facility: REHABILITATION | Age: 60
End: 2020-07-16
Attending: NURSE PRACTITIONER
Payer: MEDICAID

## 2020-07-24 ENCOUNTER — PHYSICAL THERAPY (OUTPATIENT)
Dept: PHYSICAL THERAPY | Facility: REHABILITATION | Age: 60
End: 2020-07-24
Attending: NURSE PRACTITIONER
Payer: MEDICAID

## 2020-07-24 DIAGNOSIS — M17.9 OSTEOARTHRITIS OF KNEE, UNSPECIFIED: ICD-10-CM

## 2020-07-24 PROCEDURE — 97161 PT EVAL LOW COMPLEX 20 MIN: CPT

## 2020-07-24 SDOH — ECONOMIC STABILITY: GENERAL: QUALITY OF LIFE: GOOD

## 2020-07-24 ASSESSMENT — ENCOUNTER SYMPTOMS
PAIN SCALE AT HIGHEST: 8
PAIN SCALE AT LOWEST: 7
QUALITY: ACHING
QUALITY: STABBING
PAIN SCALE: 7

## 2020-07-24 ASSESSMENT — ACTIVITIES OF DAILY LIVING (ADL): POOR_BALANCE: 1

## 2020-07-24 NOTE — OP THERAPY EVALUATION
Outpatient Physical Therapy  INITIAL EVALUATION    [facility] Physical Therapy [address].  [address]  [place] [address]  Phone:  [phone]  Fax:  [phone]    Date of Evaluation: 2020    Patient: April Alicia  YOB: 1960  MRN: [id number]     Referring Provider: Juanita King N.P.  [address] [facility] [place] [address]   Referring Diagnosis Osteoarthritis of knee, unspecified [M17.9]     Time Calculation                   Chief Complaint: No chief complaint on file.    Visit Diagnoses     ICD-10-CM   1. Pain in right wrist  M25.531         Subjective:   History of Present Illness:     Mechanism of injury:  The patient with h/o diabetes and chronic anticoagulation is scheduled for a L TKA on 2020. H/o R knee TKA in . She is here to start PT for L knee prior to her surgery.       Patient has been ambulating with bilateral crutches, WBAT L, since 2019. Prior to that, she was using a cane.    The patient has had numerous falls, most recently falling and breaking a rib in early July, which led to postponement of TKA surgery to currently scheduled date. The patient notes that when she falls, she gets dizzy, and sometimes the room spins - it affects her vision. The patient reports that she has had about 10 falls in the last year. She has also fallen from her L knee buckling.     In late 2019, the patient was diagnosed with pneumonia and spent time in Acute Care and in Acute Rehab for approximately a month.   Quality of life:  Good  Prior level of function:  Retired. Spending time with grandkids. Would like to do more walking.   Sleep disturbance:  Interrupted sleep  Pain:     Current pain ratin    At best pain ratin    At worst pain ratin    Location:  L knee along joint line and more diffusely     Quality:  Aching and stabbing    Progression:  Worsening  Social Support:     Lives in:  One-story house (small step to enter no  railing)    Lives with:  Adult children (daughter and 13 year old grandaughter)  Activities of Daily Living:     Patient reported ADL status: Grab bar in shower. Had a shower chair, but it broke. Family member is acquiring another one.   Regular height toilet - low  - has difficulty getting on/off toilet  Regular bed  Independent with ADLs/IADLs    Patient Goals:     Patient goals for therapy:  Decreased edema, increased strength, improved balance, decreased pain, increased motion and independence with ADLs/IADLs    Other patient goals:  To get back to walking. To stop falling.       Past Medical History:   Diagnosis Date   • Arthritis     OA in knees   • ASTHMA    • Diabetes    • Heart abnormality     leakage in left valve   • Heart valve disease    • Hypertension    • Infection 9/4/2017   • Liver disease    • Seizure disorder (HCC)      Past Surgical History:   Procedure Laterality Date   • IRRIGATION & DEBRIDEMENT ORTHO Right 9/3/2017    Procedure: IRRIGATION & DEBRIDEMENT ORTHO, POLY EXCHANGE;  Surgeon: Jerome Russell M.D.;  Location: SURGERY [facility];  Service: Orthopedics   • COLONOSCOPY WITH CLIPPING  10/28/2015    Procedure: COLONOSCOPY WITH CLIPPING;  Surgeon: Ruben Colon M.D.;  Location: ENDOSCOPY [facility];  Service:    • COLONOSCOPY WITH SCLEROTHERAPY  10/28/2015    Procedure: COLONOSCOPY WITH SCLEROTHERAPY;  Surgeon: Ruben Colon M.D.;  Location: ENDOSCOPY [facility];  Service:    • COLONOSCOPY WITH TATTOOING  10/28/2015    Procedure: COLONOSCOPY WITH TATTOOING;  Surgeon: Ruben Colon M.D.;  Location: ENDOSCOPY [facility];  Service:    • GYN SURGERY  2003    hysteroscoopy   • GYN SURGERY  1982    tubal ligation     Social History     Tobacco Use   • Smoking status: Former Smoker     Last attempt to quit: 12/29/2016     Years since quitting: 3.5   • Smokeless tobacco: Never Used   • Tobacco comment: 10/2010   Substance Use Topics   • Alcohol use: No     Family and  "Occupational History     Socioeconomic History   • Marital status: Single     Spouse name: Not on file   • Number of children: Not on file   • Years of education: Not on file   • Highest education level: Not on file   Occupational History   • Not on file       Objective     Neurological Testing     Sensation     Knee   Left Knee   Intact: light touch    Right Knee   Intact: light touch     Reflexes   Left   Patellar (L4): trace (1+)  Achilles (S1): trace (1+)    Right   Patellar (L4): trace (1+)  Achilles (S1): trace (1+)    Tenderness     Additional Tenderness Details  TTP medial TFJ line, lateral TFJ line, myofascia surrounding L knee    Active Range of Motion   Left Knee   Flexion: 110 degrees   Extension: -15 degrees     Right Knee   Flexion: 140 degrees   Extension: 0 degrees     Passive Range of Motion   Left Knee   Flexion: 112 degrees with pain  Extension: -10 degrees with pain    Right Knee   Flexion: 112 degrees with pain  Extension: -10 degrees with pain    Strength:      Left Knee   Flexion: 4-  Extension: 4-  Quadriceps contraction: poor    Right Knee   Flexion: 4  Extension: 4  Quadriceps contraction: fair        Therapeutic Exercises (CPT 40826):       Therapeutic Exercise Summary: HEP no charge - perform until surgery on 8/24/2020 to \"prehab\". Patient expressed understanding. Handout provided.  Access Code: [email or web address]   URL: [email or web address]   Date: 07/24/2020   Prepared by: Sophia Collins     Exercises  Supine Ankle Pumps - 10 reps - 3 sets - 1x daily - 7x weekly  Supine Quad Set - 15 reps - 3 sets - 1x daily - 7x weekly  Clamshell - 15 reps - 3 sets - 1x daily - 7x weekly  Prone Hip Extension - 15 reps - 3 sets - 1x daily - 7x weekly      Time-based treatments/modalities:           Assessment, Response and Plan:   Impairments: abnormal gait, abnormal or restricted ROM, impaired functional mobility, impaired balance, impaired physical strength, lacks appropriate home exercise program, " "limited mobility, pain with function, safety issue and swelling    Assessment details:  60 year old female with fall history and multiple medical co-morbidities presents with chief complaint of L knee pain prior to her scheduled L TKA on 8/24/2020. Exam findings show impairments including ROM, gait, weight bearing tolerance, balance, muscle performance at hip and knee that impact the patient's safety and function. The patient will benefit from skilled PT to address the above. Patient provided with   \"prehab\" exercises at this visit. She will return for first follow up after her surgery.  Barriers to therapy:  None  Goals:   Short Term Goals:   Patient is independent/compliant with HEP  Short term goal time span:  2-4 weeks      Long Term Goals:    WOMAC score shows <40%   Patient ambulates with least restrictive AD 6MWT WFL for age gender norms  L knee ROM 0-140 deg  L quad/hip extensor, abductor, er >/= 4/5 MMT  Patient reports no falls  Long term goal time span:  6-8 weeks    Plan:   Therapy options:  Physical therapy treatment to continue  Planned therapy interventions:  Neuromuscular Re-education (CPT 17193), Therapeutic Exercise (CPT 26602), Gait Training (CPT 72212) and E Stim Unattended (CPT 27686)  Frequency:  2x week  Duration in visits:  16  Discussed with:  Patient      Functional Assessment Used        Referring provider co-signature:  I have reviewed this plan of care and my co-signature certifies the need for services.    Certification Period: 07/24/2020 to  10/24/2020    Physician Signature: ________________________________ Date: ______________               "

## 2020-07-30 ENCOUNTER — APPOINTMENT (OUTPATIENT)
Dept: PHYSICAL THERAPY | Facility: REHABILITATION | Age: 60
End: 2020-07-30
Attending: NURSE PRACTITIONER
Payer: MEDICAID

## 2020-08-05 ENCOUNTER — APPOINTMENT (OUTPATIENT)
Dept: PHYSICAL THERAPY | Facility: REHABILITATION | Age: 60
End: 2020-08-05
Attending: NURSE PRACTITIONER
Payer: MEDICAID

## 2020-08-07 ENCOUNTER — APPOINTMENT (OUTPATIENT)
Dept: PHYSICAL THERAPY | Facility: REHABILITATION | Age: 60
End: 2020-08-07
Attending: NURSE PRACTITIONER
Payer: MEDICAID

## 2020-08-11 ENCOUNTER — APPOINTMENT (OUTPATIENT)
Dept: PHYSICAL THERAPY | Facility: REHABILITATION | Age: 60
End: 2020-08-11
Attending: NURSE PRACTITIONER
Payer: MEDICAID

## 2020-08-13 ENCOUNTER — APPOINTMENT (OUTPATIENT)
Dept: PHYSICAL THERAPY | Facility: REHABILITATION | Age: 60
End: 2020-08-13
Attending: NURSE PRACTITIONER
Payer: MEDICAID

## 2020-08-19 ENCOUNTER — APPOINTMENT (OUTPATIENT)
Dept: PHYSICAL THERAPY | Facility: REHABILITATION | Age: 60
End: 2020-08-19
Attending: NURSE PRACTITIONER
Payer: MEDICAID

## 2020-08-19 DIAGNOSIS — Z01.812 PRE-OPERATIVE LABORATORY EXAMINATION: ICD-10-CM

## 2020-08-19 LAB
ANION GAP SERPL CALC-SCNC: 7 MMOL/L (ref 7–16)
BUN SERPL-MCNC: 12 MG/DL (ref 8–22)
CALCIUM SERPL-MCNC: 8.5 MG/DL (ref 8.4–10.2)
CHLORIDE SERPL-SCNC: 107 MMOL/L (ref 96–112)
CO2 SERPL-SCNC: 26 MMOL/L (ref 20–33)
COVID ORDER STATUS COVID19: NORMAL
CREAT SERPL-MCNC: 0.64 MG/DL (ref 0.5–1.4)
ERYTHROCYTE [DISTWIDTH] IN BLOOD BY AUTOMATED COUNT: 49.1 FL (ref 35.9–50)
EST. AVERAGE GLUCOSE BLD GHB EST-MCNC: 108 MG/DL
GLUCOSE SERPL-MCNC: 132 MG/DL (ref 65–99)
HBA1C MFR BLD: 5.4 % (ref 0–5.6)
HCT VFR BLD AUTO: 36.5 % (ref 37–47)
HGB BLD-MCNC: 11.2 G/DL (ref 12–16)
MCH RBC QN AUTO: 27.1 PG (ref 27–33)
MCHC RBC AUTO-ENTMCNC: 30.7 G/DL (ref 33.6–35)
MCV RBC AUTO: 88.4 FL (ref 81.4–97.8)
PLATELET # BLD AUTO: 98 K/UL (ref 164–446)
PMV BLD AUTO: 10.6 FL (ref 9–12.9)
POTASSIUM SERPL-SCNC: 3.4 MMOL/L (ref 3.6–5.5)
RBC # BLD AUTO: 4.13 M/UL (ref 4.2–5.4)
SARS-COV-2 RNA RESP QL NAA+PROBE: NOTDETECTED
SODIUM SERPL-SCNC: 140 MMOL/L (ref 135–145)
SPECIMEN SOURCE: NORMAL
WBC # BLD AUTO: 3 K/UL (ref 4.8–10.8)

## 2020-08-19 PROCEDURE — 87641 MR-STAPH DNA AMP PROBE: CPT

## 2020-08-19 PROCEDURE — 85027 COMPLETE CBC AUTOMATED: CPT

## 2020-08-19 PROCEDURE — 36415 COLL VENOUS BLD VENIPUNCTURE: CPT

## 2020-08-19 PROCEDURE — U0003 INFECTIOUS AGENT DETECTION BY NUCLEIC ACID (DNA OR RNA); SEVERE ACUTE RESPIRATORY SYNDROME CORONAVIRUS 2 (SARS-COV-2) (CORONAVIRUS DISEASE [COVID-19]), AMPLIFIED PROBE TECHNIQUE, MAKING USE OF HIGH THROUGHPUT TECHNOLOGIES AS DESCRIBED BY CMS-2020-01-R: HCPCS

## 2020-08-19 PROCEDURE — 80048 BASIC METABOLIC PNL TOTAL CA: CPT

## 2020-08-19 PROCEDURE — 87640 STAPH A DNA AMP PROBE: CPT

## 2020-08-19 PROCEDURE — 83036 HEMOGLOBIN GLYCOSYLATED A1C: CPT

## 2020-08-19 RX ORDER — OMEGA-3/DHA/EPA/FISH OIL 200-300 MG
CAPSULE ORAL
COMMUNITY
Start: 2020-08-04 | End: 2020-09-13

## 2020-08-19 RX ORDER — PANTOPRAZOLE SODIUM 40 MG/1
TABLET, DELAYED RELEASE ORAL
COMMUNITY
Start: 2020-08-04 | End: 2020-09-13

## 2020-08-19 RX ORDER — SULFAMETHOXAZOLE AND TRIMETHOPRIM 800; 160 MG/1; MG/1
TABLET ORAL
COMMUNITY
Start: 2020-08-04 | End: 2020-09-13

## 2020-08-19 RX ORDER — PRENATAL VIT/IRON FUM/FOLIC AC 27MG-0.8MG
TABLET ORAL
COMMUNITY
Start: 2020-08-06 | End: 2022-01-25

## 2020-08-19 RX ORDER — IBUPROFEN 600 MG/1
TABLET ORAL
Status: ON HOLD | COMMUNITY
Start: 2020-08-04 | End: 2020-08-25

## 2020-08-19 RX ORDER — LIDOCAINE AND PRILOCAINE 25; 25 MG/G; MG/G
CREAM TOPICAL
COMMUNITY
Start: 2020-08-04 | End: 2020-09-13

## 2020-08-19 RX ORDER — ONDANSETRON 4 MG/1
TABLET, FILM COATED ORAL
COMMUNITY
Start: 2020-08-04 | End: 2020-09-13

## 2020-08-19 RX ORDER — LIDOCAINE 50 MG/G
PATCH TOPICAL
COMMUNITY
Start: 2020-08-04 | End: 2020-09-13

## 2020-08-19 ASSESSMENT — FIBROSIS 4 INDEX: FIB4 SCORE: 3.39

## 2020-08-19 NOTE — OR NURSING
"Pre-admit appointment completed. \"Preparing for your Procedure\" sheet given to Pt with verbal and written instructions. Pt states all instructions given are understood and to call pre-admit or Dr's office for additional questions or any symptoms of illness/covid develop prior to DOS.  Medications the patient will take the morning of surgery per anesthesia protocol: Albuterol, Gabapentin, Oxycodone, Pantoprazole, Spiriva    Surgery preparation checklist for Joint Replacement Program given to Pt with verbal instructions.     Denies anesthesia complications  "

## 2020-08-19 NOTE — DISCHARGE PLANNING
DISCHARGE PLANNING NOTE - TOTAL JOINT     Procedure: Procedure(s):  ARTHROPLASTY, KNEE, TOTAL  Procedure Date: 8/24/2020  Insurance:  Payor: MEDICAID FFS / Plan: [place] MEDICAID / Product Type: *No Product type* /   Equipment currently available at home? crutches  Steps into the home? 2  Steps within the home? 0  Toilet height? Standard  Type of shower? walk-in shower  Who will be with you during your recovery? Daughter.  Is Outpatient Physical Therapy set up after surgery? Yes  Did you take the Total Joint Class and where? Yes, at [facility] book and info on classes given to pt.   Planning on same day surgery? No.     This writer met with pt during her preadmission appointment. Pt has crutches and states she is planning on going to a snf for rehab. Pt states Dr. Faustin advised her to tell staff at todays appointment. Choice obtained for snf and choice obtained for a walker. Both scanned in to [employer]. Home safety checklist reviewed and copy given to pt. All questions answered. Pt verbalizes understanding of all instructions. Pt uses transportation services via the [facility]. No further dc needs identified. Anticipate dc to snf or to home without barriers.

## 2020-08-20 LAB
SCCMEC + MECA PNL NOSE NAA+PROBE: NEGATIVE
SCCMEC + MECA PNL NOSE NAA+PROBE: POSITIVE

## 2020-08-21 ENCOUNTER — APPOINTMENT (OUTPATIENT)
Dept: PHYSICAL THERAPY | Facility: REHABILITATION | Age: 60
End: 2020-08-21
Attending: NURSE PRACTITIONER
Payer: MEDICAID

## 2020-08-23 ENCOUNTER — ANESTHESIA EVENT (OUTPATIENT)
Dept: SURGERY | Facility: MEDICAL CENTER | Age: 60
DRG: 470 | End: 2020-08-23
Payer: MEDICAID

## 2020-08-23 PROBLEM — K74.60 CIRRHOSIS (HCC): Chronic | Status: ACTIVE | Noted: 2020-08-23

## 2020-08-23 NOTE — ANESTHESIA PREPROCEDURE EVALUATION
Relevant Problems   PULMONARY   (+) History of rheumatic fever      NEURO   (+) History of rheumatic fever   (+) Medication overuse headache      CARDIAC   (+) Hypertension   (+) Migraine with aura and without status migrainosus, not intractable         (+) Cirrhosis, alcoholic (HCC)      ENDO   (+) Diabetic polyneuropathy associated with type 2 diabetes mellitus (HCC)      Other   (+) Controlled type 2 diabetes mellitus with hyperglycemia, without long-term current use of insulin (HCC)   (+) Thrombocytopenia (HCC)       Physical Exam    Airway   Mallampati: II  TM distance: >3 FB  Neck ROM: full       Cardiovascular - normal exam  Rhythm: regular  Rate: normal  (-) murmur     Dental - normal exam  (+) upper dentures, lower dentures           Pulmonary - normal exam  Breath sounds clear to auscultation     Abdominal    Neurological - normal exam               Anesthesia Plan    ASA 4   ASA physical status 4 criteria: other (comment)    Plan - general and peripheral nerve block     Peripheral nerve block will be post-op pain control  Airway plan will be LMA        Induction: intravenous    Postoperative Plan: Postoperative administration of opioids is intended.    Pertinent diagnostic labs and testing reviewed    Informed Consent:    Anesthetic plan and risks discussed with patient.    Use of blood products discussed with: patient whom consented to blood products.

## 2020-08-24 ENCOUNTER — ANESTHESIA (OUTPATIENT)
Dept: SURGERY | Facility: MEDICAL CENTER | Age: 60
DRG: 470 | End: 2020-08-24
Payer: MEDICAID

## 2020-08-24 ENCOUNTER — HOSPITAL ENCOUNTER (INPATIENT)
Facility: MEDICAL CENTER | Age: 60
LOS: 3 days | DRG: 470 | End: 2020-08-27
Attending: ORTHOPAEDIC SURGERY | Admitting: ORTHOPAEDIC SURGERY
Payer: MEDICAID

## 2020-08-24 ENCOUNTER — APPOINTMENT (OUTPATIENT)
Dept: RADIOLOGY | Facility: MEDICAL CENTER | Age: 60
DRG: 470 | End: 2020-08-24
Attending: ORTHOPAEDIC SURGERY
Payer: MEDICAID

## 2020-08-24 DIAGNOSIS — M17.12 PRIMARY OSTEOARTHRITIS OF LEFT KNEE: ICD-10-CM

## 2020-08-24 LAB — GLUCOSE BLD-MCNC: 93 MG/DL (ref 65–99)

## 2020-08-24 PROCEDURE — 160002 HCHG RECOVERY MINUTES (STAT): Performed by: ORTHOPAEDIC SURGERY

## 2020-08-24 PROCEDURE — 502000 HCHG MISC OR IMPLANTS RC 0278: Performed by: ORTHOPAEDIC SURGERY

## 2020-08-24 PROCEDURE — 160036 HCHG PACU - EA ADDL 30 MINS PHASE I: Performed by: ORTHOPAEDIC SURGERY

## 2020-08-24 PROCEDURE — 94760 N-INVAS EAR/PLS OXIMETRY 1: CPT

## 2020-08-24 PROCEDURE — 3E0T3BZ INTRODUCTION OF ANESTHETIC AGENT INTO PERIPHERAL NERVES AND PLEXI, PERCUTANEOUS APPROACH: ICD-10-PCS | Performed by: ANESTHESIOLOGY

## 2020-08-24 PROCEDURE — 700101 HCHG RX REV CODE 250: Performed by: ORTHOPAEDIC SURGERY

## 2020-08-24 PROCEDURE — 82962 GLUCOSE BLOOD TEST: CPT

## 2020-08-24 PROCEDURE — 700105 HCHG RX REV CODE 258: Performed by: ORTHOPAEDIC SURGERY

## 2020-08-24 PROCEDURE — 700101 HCHG RX REV CODE 250: Performed by: ANESTHESIOLOGY

## 2020-08-24 PROCEDURE — 700111 HCHG RX REV CODE 636 W/ 250 OVERRIDE (IP): Performed by: ANESTHESIOLOGY

## 2020-08-24 PROCEDURE — 160041 HCHG SURGERY MINUTES - EA ADDL 1 MIN LEVEL 4: Performed by: ORTHOPAEDIC SURGERY

## 2020-08-24 PROCEDURE — 0SRD0J9 REPLACEMENT OF LEFT KNEE JOINT WITH SYNTHETIC SUBSTITUTE, CEMENTED, OPEN APPROACH: ICD-10-PCS | Performed by: ORTHOPAEDIC SURGERY

## 2020-08-24 PROCEDURE — 700102 HCHG RX REV CODE 250 W/ 637 OVERRIDE(OP): Performed by: ORTHOPAEDIC SURGERY

## 2020-08-24 PROCEDURE — 700102 HCHG RX REV CODE 250 W/ 637 OVERRIDE(OP): Performed by: ANESTHESIOLOGY

## 2020-08-24 PROCEDURE — 700111 HCHG RX REV CODE 636 W/ 250 OVERRIDE (IP): Performed by: ORTHOPAEDIC SURGERY

## 2020-08-24 PROCEDURE — 770001 HCHG ROOM/CARE - MED/SURG/GYN PRIV*

## 2020-08-24 PROCEDURE — 160035 HCHG PACU - 1ST 60 MINS PHASE I: Performed by: ORTHOPAEDIC SURGERY

## 2020-08-24 PROCEDURE — 160048 HCHG OR STATISTICAL LEVEL 1-5: Performed by: ORTHOPAEDIC SURGERY

## 2020-08-24 PROCEDURE — A9270 NON-COVERED ITEM OR SERVICE: HCPCS | Performed by: ORTHOPAEDIC SURGERY

## 2020-08-24 PROCEDURE — 160009 HCHG ANES TIME/MIN: Performed by: ORTHOPAEDIC SURGERY

## 2020-08-24 PROCEDURE — 73560 X-RAY EXAM OF KNEE 1 OR 2: CPT | Mod: LT

## 2020-08-24 PROCEDURE — L8699 PROSTHETIC IMPLANT NOS: HCPCS | Performed by: ORTHOPAEDIC SURGERY

## 2020-08-24 PROCEDURE — 501838 HCHG SUTURE GENERAL: Performed by: ORTHOPAEDIC SURGERY

## 2020-08-24 PROCEDURE — 64447 NJX AA&/STRD FEMORAL NRV IMG: CPT | Performed by: ORTHOPAEDIC SURGERY

## 2020-08-24 PROCEDURE — 160029 HCHG SURGERY MINUTES - 1ST 30 MINS LEVEL 4: Performed by: ORTHOPAEDIC SURGERY

## 2020-08-24 PROCEDURE — A9270 NON-COVERED ITEM OR SERVICE: HCPCS | Performed by: ANESTHESIOLOGY

## 2020-08-24 DEVICE — IMPLANTABLE DEVICE: Type: IMPLANTABLE DEVICE | Site: KNEE | Status: FUNCTIONAL

## 2020-08-24 DEVICE — CEMENT ORTHOPEDIC HV US  (10/PK): Type: IMPLANTABLE DEVICE | Site: KNEE | Status: FUNCTIONAL

## 2020-08-24 DEVICE — PATELLA GII OVAL RESURFACING PAT 38MM: Type: IMPLANTABLE DEVICE | Site: KNEE | Status: FUNCTIONAL

## 2020-08-24 DEVICE — IMPLANT GNS II C/R FEM SIZE 5 LEFT: Type: IMPLANTABLE DEVICE | Site: KNEE | Status: FUNCTIONAL

## 2020-08-24 DEVICE — IMPLANT GNS II CMT TIB SIZE 6 LEFT (1EA): Type: IMPLANTABLE DEVICE | Site: KNEE | Status: FUNCTIONAL

## 2020-08-24 RX ORDER — HALOPERIDOL 5 MG/ML
1 INJECTION INTRAMUSCULAR
Status: DISCONTINUED | OUTPATIENT
Start: 2020-08-24 | End: 2020-08-24 | Stop reason: HOSPADM

## 2020-08-24 RX ORDER — IPRATROPIUM BROMIDE AND ALBUTEROL SULFATE 2.5; .5 MG/3ML; MG/3ML
3 SOLUTION RESPIRATORY (INHALATION)
Status: DISCONTINUED | OUTPATIENT
Start: 2020-08-24 | End: 2020-08-24 | Stop reason: HOSPADM

## 2020-08-24 RX ORDER — LIDOCAINE HYDROCHLORIDE 20 MG/ML
INJECTION, SOLUTION EPIDURAL; INFILTRATION; INTRACAUDAL; PERINEURAL PRN
Status: DISCONTINUED | OUTPATIENT
Start: 2020-08-24 | End: 2020-08-24 | Stop reason: HOSPADM

## 2020-08-24 RX ORDER — SODIUM CHLORIDE, SODIUM LACTATE, POTASSIUM CHLORIDE, CALCIUM CHLORIDE 600; 310; 30; 20 MG/100ML; MG/100ML; MG/100ML; MG/100ML
INJECTION, SOLUTION INTRAVENOUS CONTINUOUS
Status: DISCONTINUED | OUTPATIENT
Start: 2020-08-24 | End: 2020-08-24 | Stop reason: HOSPADM

## 2020-08-24 RX ORDER — SCOLOPAMINE TRANSDERMAL SYSTEM 1 MG/1
1 PATCH, EXTENDED RELEASE TRANSDERMAL
Status: DISCONTINUED | OUTPATIENT
Start: 2020-08-24 | End: 2020-08-27 | Stop reason: HOSPADM

## 2020-08-24 RX ORDER — FELODIPINE 5 MG/1
5 TABLET, EXTENDED RELEASE ORAL EVERY EVENING
Status: DISCONTINUED | OUTPATIENT
Start: 2020-08-24 | End: 2020-08-27 | Stop reason: HOSPADM

## 2020-08-24 RX ORDER — DEXAMETHASONE SODIUM PHOSPHATE 4 MG/ML
4 INJECTION, SOLUTION INTRA-ARTICULAR; INTRALESIONAL; INTRAMUSCULAR; INTRAVENOUS; SOFT TISSUE
Status: DISCONTINUED | OUTPATIENT
Start: 2020-08-24 | End: 2020-08-27 | Stop reason: HOSPADM

## 2020-08-24 RX ORDER — HYDROMORPHONE HYDROCHLORIDE 2 MG/ML
INJECTION, SOLUTION INTRAMUSCULAR; INTRAVENOUS; SUBCUTANEOUS PRN
Status: DISCONTINUED | OUTPATIENT
Start: 2020-08-24 | End: 2020-08-24 | Stop reason: SURG

## 2020-08-24 RX ORDER — LABETALOL HYDROCHLORIDE 5 MG/ML
5 INJECTION, SOLUTION INTRAVENOUS
Status: DISCONTINUED | OUTPATIENT
Start: 2020-08-24 | End: 2020-08-24 | Stop reason: HOSPADM

## 2020-08-24 RX ORDER — OXYCODONE HYDROCHLORIDE 10 MG/1
10 TABLET ORAL
Status: DISCONTINUED | OUTPATIENT
Start: 2020-08-24 | End: 2020-08-27 | Stop reason: HOSPADM

## 2020-08-24 RX ORDER — ONDANSETRON 2 MG/ML
INJECTION INTRAMUSCULAR; INTRAVENOUS PRN
Status: DISCONTINUED | OUTPATIENT
Start: 2020-08-24 | End: 2020-08-24 | Stop reason: SURG

## 2020-08-24 RX ORDER — DIPHENHYDRAMINE HYDROCHLORIDE 50 MG/ML
25 INJECTION INTRAMUSCULAR; INTRAVENOUS EVERY 6 HOURS PRN
Status: DISCONTINUED | OUTPATIENT
Start: 2020-08-24 | End: 2020-08-27 | Stop reason: HOSPADM

## 2020-08-24 RX ORDER — HALOPERIDOL 5 MG/ML
1 INJECTION INTRAMUSCULAR EVERY 6 HOURS PRN
Status: DISCONTINUED | OUTPATIENT
Start: 2020-08-24 | End: 2020-08-27 | Stop reason: HOSPADM

## 2020-08-24 RX ORDER — SODIUM CHLORIDE, SODIUM LACTATE, POTASSIUM CHLORIDE, CALCIUM CHLORIDE 600; 310; 30; 20 MG/100ML; MG/100ML; MG/100ML; MG/100ML
INJECTION, SOLUTION INTRAVENOUS CONTINUOUS
Status: ACTIVE | OUTPATIENT
Start: 2020-08-24 | End: 2020-08-25

## 2020-08-24 RX ORDER — TOBRAMYCIN 1.2 G/30ML
INJECTION, POWDER, LYOPHILIZED, FOR SOLUTION INTRAVENOUS
Status: DISCONTINUED | OUTPATIENT
Start: 2020-08-24 | End: 2020-08-24 | Stop reason: HOSPADM

## 2020-08-24 RX ORDER — DIPHENHYDRAMINE HCL 25 MG
25 TABLET ORAL EVERY 6 HOURS PRN
Status: DISCONTINUED | OUTPATIENT
Start: 2020-08-24 | End: 2020-08-27 | Stop reason: HOSPADM

## 2020-08-24 RX ORDER — SODIUM CHLORIDE 9 MG/ML
INJECTION, SOLUTION INTRAMUSCULAR; INTRAVENOUS; SUBCUTANEOUS
Status: DISCONTINUED | OUTPATIENT
Start: 2020-08-24 | End: 2020-08-24 | Stop reason: HOSPADM

## 2020-08-24 RX ORDER — ALBUTEROL SULFATE 90 UG/1
2 AEROSOL, METERED RESPIRATORY (INHALATION) EVERY 6 HOURS PRN
Status: DISCONTINUED | OUTPATIENT
Start: 2020-08-24 | End: 2020-08-27 | Stop reason: HOSPADM

## 2020-08-24 RX ORDER — ROCURONIUM BROMIDE 10 MG/ML
INJECTION, SOLUTION INTRAVENOUS PRN
Status: DISCONTINUED | OUTPATIENT
Start: 2020-08-24 | End: 2020-08-24 | Stop reason: SURG

## 2020-08-24 RX ORDER — ROPIVACAINE HYDROCHLORIDE 5 MG/ML
INJECTION, SOLUTION EPIDURAL; INFILTRATION; PERINEURAL
Status: DISCONTINUED | OUTPATIENT
Start: 2020-08-24 | End: 2020-08-24 | Stop reason: HOSPADM

## 2020-08-24 RX ORDER — OXYCODONE HYDROCHLORIDE 5 MG/1
5 TABLET ORAL
Status: DISCONTINUED | OUTPATIENT
Start: 2020-08-24 | End: 2020-08-27 | Stop reason: HOSPADM

## 2020-08-24 RX ORDER — ENEMA 19; 7 G/133ML; G/133ML
1 ENEMA RECTAL
Status: DISCONTINUED | OUTPATIENT
Start: 2020-08-24 | End: 2020-08-27 | Stop reason: HOSPADM

## 2020-08-24 RX ORDER — OMEPRAZOLE 20 MG/1
40 CAPSULE, DELAYED RELEASE ORAL DAILY
Status: DISCONTINUED | OUTPATIENT
Start: 2020-08-25 | End: 2020-08-27 | Stop reason: HOSPADM

## 2020-08-24 RX ORDER — LOSARTAN POTASSIUM 25 MG/1
100 TABLET ORAL DAILY
Status: DISCONTINUED | OUTPATIENT
Start: 2020-08-25 | End: 2020-08-27 | Stop reason: HOSPADM

## 2020-08-24 RX ORDER — MEPERIDINE HYDROCHLORIDE 25 MG/ML
6.25 INJECTION INTRAMUSCULAR; INTRAVENOUS; SUBCUTANEOUS
Status: DISCONTINUED | OUTPATIENT
Start: 2020-08-24 | End: 2020-08-24 | Stop reason: HOSPADM

## 2020-08-24 RX ORDER — HYDRALAZINE HYDROCHLORIDE 20 MG/ML
5 INJECTION INTRAMUSCULAR; INTRAVENOUS
Status: DISCONTINUED | OUTPATIENT
Start: 2020-08-24 | End: 2020-08-24 | Stop reason: HOSPADM

## 2020-08-24 RX ORDER — POTASSIUM CHLORIDE 20 MEQ/1
10 TABLET, EXTENDED RELEASE ORAL DAILY
Status: DISCONTINUED | OUTPATIENT
Start: 2020-08-25 | End: 2020-08-27 | Stop reason: HOSPADM

## 2020-08-24 RX ORDER — HYDROMORPHONE HYDROCHLORIDE 1 MG/ML
0.1 INJECTION, SOLUTION INTRAMUSCULAR; INTRAVENOUS; SUBCUTANEOUS
Status: DISCONTINUED | OUTPATIENT
Start: 2020-08-24 | End: 2020-08-24 | Stop reason: HOSPADM

## 2020-08-24 RX ORDER — TRANEXAMIC ACID 100 MG/ML
INJECTION, SOLUTION INTRAVENOUS PRN
Status: DISCONTINUED | OUTPATIENT
Start: 2020-08-24 | End: 2020-08-24 | Stop reason: SURG

## 2020-08-24 RX ORDER — LIDOCAINE HYDROCHLORIDE 40 MG/ML
SOLUTION TOPICAL PRN
Status: DISCONTINUED | OUTPATIENT
Start: 2020-08-24 | End: 2020-08-24 | Stop reason: SURG

## 2020-08-24 RX ORDER — EPINEPHRINE 1 MG/ML(1)
AMPUL (ML) INJECTION
Status: DISCONTINUED | OUTPATIENT
Start: 2020-08-24 | End: 2020-08-24 | Stop reason: HOSPADM

## 2020-08-24 RX ORDER — METFORMIN HYDROCHLORIDE 500 MG/1
500 TABLET, EXTENDED RELEASE ORAL EVERY EVENING
Status: DISCONTINUED | OUTPATIENT
Start: 2020-08-24 | End: 2020-08-27 | Stop reason: HOSPADM

## 2020-08-24 RX ORDER — HYDROMORPHONE HYDROCHLORIDE 1 MG/ML
0.2 INJECTION, SOLUTION INTRAMUSCULAR; INTRAVENOUS; SUBCUTANEOUS
Status: DISCONTINUED | OUTPATIENT
Start: 2020-08-24 | End: 2020-08-24 | Stop reason: HOSPADM

## 2020-08-24 RX ORDER — HYDROMORPHONE HYDROCHLORIDE 1 MG/ML
0.5 INJECTION, SOLUTION INTRAMUSCULAR; INTRAVENOUS; SUBCUTANEOUS
Status: DISCONTINUED | OUTPATIENT
Start: 2020-08-24 | End: 2020-08-27 | Stop reason: HOSPADM

## 2020-08-24 RX ORDER — DEXAMETHASONE SODIUM PHOSPHATE 4 MG/ML
INJECTION, SOLUTION INTRA-ARTICULAR; INTRALESIONAL; INTRAMUSCULAR; INTRAVENOUS; SOFT TISSUE PRN
Status: DISCONTINUED | OUTPATIENT
Start: 2020-08-24 | End: 2020-08-24 | Stop reason: SURG

## 2020-08-24 RX ORDER — AMOXICILLIN 250 MG
1 CAPSULE ORAL
Status: DISCONTINUED | OUTPATIENT
Start: 2020-08-24 | End: 2020-08-27 | Stop reason: HOSPADM

## 2020-08-24 RX ORDER — OXYCODONE HCL 5 MG/5 ML
5 SOLUTION, ORAL ORAL
Status: COMPLETED | OUTPATIENT
Start: 2020-08-24 | End: 2020-08-24

## 2020-08-24 RX ORDER — ONDANSETRON 2 MG/ML
4 INJECTION INTRAMUSCULAR; INTRAVENOUS
Status: DISCONTINUED | OUTPATIENT
Start: 2020-08-24 | End: 2020-08-24 | Stop reason: HOSPADM

## 2020-08-24 RX ORDER — BISACODYL 10 MG
10 SUPPOSITORY, RECTAL RECTAL
Status: DISCONTINUED | OUTPATIENT
Start: 2020-08-24 | End: 2020-08-27 | Stop reason: HOSPADM

## 2020-08-24 RX ORDER — ACETAMINOPHEN 325 MG/1
650 TABLET ORAL EVERY 6 HOURS
Status: DISCONTINUED | OUTPATIENT
Start: 2020-08-24 | End: 2020-08-27 | Stop reason: HOSPADM

## 2020-08-24 RX ORDER — SODIUM CHLORIDE, SODIUM LACTATE, POTASSIUM CHLORIDE, CALCIUM CHLORIDE 600; 310; 30; 20 MG/100ML; MG/100ML; MG/100ML; MG/100ML
INJECTION, SOLUTION INTRAVENOUS CONTINUOUS
Status: DISCONTINUED | OUTPATIENT
Start: 2020-08-24 | End: 2020-08-27 | Stop reason: HOSPADM

## 2020-08-24 RX ORDER — ONDANSETRON 2 MG/ML
4 INJECTION INTRAMUSCULAR; INTRAVENOUS EVERY 4 HOURS PRN
Status: DISCONTINUED | OUTPATIENT
Start: 2020-08-24 | End: 2020-08-27 | Stop reason: HOSPADM

## 2020-08-24 RX ORDER — DIPHENHYDRAMINE HCL 25 MG
25 TABLET ORAL NIGHTLY PRN
Status: DISCONTINUED | OUTPATIENT
Start: 2020-08-25 | End: 2020-08-27 | Stop reason: HOSPADM

## 2020-08-24 RX ORDER — GABAPENTIN 300 MG/1
300 CAPSULE ORAL ONCE
Status: DISCONTINUED | OUTPATIENT
Start: 2020-08-24 | End: 2020-08-24 | Stop reason: HOSPADM

## 2020-08-24 RX ORDER — OXYCODONE HCL 5 MG/5 ML
10 SOLUTION, ORAL ORAL
Status: COMPLETED | OUTPATIENT
Start: 2020-08-24 | End: 2020-08-24

## 2020-08-24 RX ORDER — ROPIVACAINE HYDROCHLORIDE 5 MG/ML
INJECTION, SOLUTION EPIDURAL; INFILTRATION; PERINEURAL
Status: COMPLETED | OUTPATIENT
Start: 2020-08-24 | End: 2020-08-24

## 2020-08-24 RX ORDER — DOCUSATE SODIUM 100 MG/1
100 CAPSULE, LIQUID FILLED ORAL 2 TIMES DAILY
Status: DISCONTINUED | OUTPATIENT
Start: 2020-08-24 | End: 2020-08-27 | Stop reason: HOSPADM

## 2020-08-24 RX ORDER — CEFAZOLIN SODIUM 1 G/3ML
INJECTION, POWDER, FOR SOLUTION INTRAMUSCULAR; INTRAVENOUS PRN
Status: DISCONTINUED | OUTPATIENT
Start: 2020-08-24 | End: 2020-08-24 | Stop reason: SURG

## 2020-08-24 RX ORDER — OXYCODONE HCL 10 MG/1
10 TABLET, FILM COATED, EXTENDED RELEASE ORAL ONCE
Status: COMPLETED | OUTPATIENT
Start: 2020-08-24 | End: 2020-08-24

## 2020-08-24 RX ORDER — AMOXICILLIN 250 MG
1 CAPSULE ORAL NIGHTLY
Status: DISCONTINUED | OUTPATIENT
Start: 2020-08-24 | End: 2020-08-27 | Stop reason: HOSPADM

## 2020-08-24 RX ORDER — POLYETHYLENE GLYCOL 3350 17 G/17G
1 POWDER, FOR SOLUTION ORAL 2 TIMES DAILY PRN
Status: DISCONTINUED | OUTPATIENT
Start: 2020-08-24 | End: 2020-08-27 | Stop reason: HOSPADM

## 2020-08-24 RX ORDER — HYDROMORPHONE HYDROCHLORIDE 1 MG/ML
0.4 INJECTION, SOLUTION INTRAMUSCULAR; INTRAVENOUS; SUBCUTANEOUS
Status: DISCONTINUED | OUTPATIENT
Start: 2020-08-24 | End: 2020-08-24 | Stop reason: HOSPADM

## 2020-08-24 RX ADMIN — OXYCODONE HYDROCHLORIDE 10 MG: 10 TABLET ORAL at 19:43

## 2020-08-24 RX ADMIN — FENTANYL CITRATE 50 MCG: 50 INJECTION, SOLUTION INTRAMUSCULAR; INTRAVENOUS at 16:50

## 2020-08-24 RX ADMIN — LIDOCAINE HYDROCHLORIDE 4 ML: 40 SOLUTION TOPICAL at 14:57

## 2020-08-24 RX ADMIN — GABAPENTIN 400 MG: 100 CAPSULE ORAL at 18:42

## 2020-08-24 RX ADMIN — GLYCOPYRROLATE 1 CAPSULE: 15.6 CAPSULE RESPIRATORY (INHALATION) at 18:42

## 2020-08-24 RX ADMIN — ONDANSETRON 4 MG: 2 INJECTION INTRAMUSCULAR; INTRAVENOUS at 16:45

## 2020-08-24 RX ADMIN — HYDROMORPHONE HYDROCHLORIDE 0.5 MG: 1 INJECTION, SOLUTION INTRAMUSCULAR; INTRAVENOUS; SUBCUTANEOUS at 21:26

## 2020-08-24 RX ADMIN — SODIUM CHLORIDE, POTASSIUM CHLORIDE, SODIUM LACTATE AND CALCIUM CHLORIDE 1000 ML: 600; 310; 30; 20 INJECTION, SOLUTION INTRAVENOUS at 13:49

## 2020-08-24 RX ADMIN — PROPOFOL 180 MG: 10 INJECTION, EMULSION INTRAVENOUS at 14:53

## 2020-08-24 RX ADMIN — METFORMIN HYDROCHLORIDE 500 MG: 500 TABLET, EXTENDED RELEASE ORAL at 18:42

## 2020-08-24 RX ADMIN — TRANEXAMIC ACID 1000 MG: 100 INJECTION, SOLUTION INTRAVENOUS at 15:06

## 2020-08-24 RX ADMIN — ROPIVACAINE HYDROCHLORIDE 20 ML: 5 INJECTION, SOLUTION EPIDURAL; INFILTRATION; PERINEURAL at 14:33

## 2020-08-24 RX ADMIN — FENTANYL CITRATE 50 MCG: 50 INJECTION, SOLUTION INTRAMUSCULAR; INTRAVENOUS at 17:04

## 2020-08-24 RX ADMIN — OXYCODONE HYDROCHLORIDE 10 MG: 10 TABLET, FILM COATED, EXTENDED RELEASE ORAL at 13:50

## 2020-08-24 RX ADMIN — ROCURONIUM BROMIDE 50 MG: 10 INJECTION, SOLUTION INTRAVENOUS at 14:56

## 2020-08-24 RX ADMIN — HYDROMORPHONE HYDROCHLORIDE 0.5 MG: 2 INJECTION, SOLUTION INTRAMUSCULAR; INTRAVENOUS; SUBCUTANEOUS at 16:45

## 2020-08-24 RX ADMIN — HYDROMORPHONE HYDROCHLORIDE 0.5 MG: 2 INJECTION, SOLUTION INTRAMUSCULAR; INTRAVENOUS; SUBCUTANEOUS at 16:01

## 2020-08-24 RX ADMIN — FENTANYL CITRATE 50 MCG: 50 INJECTION, SOLUTION INTRAMUSCULAR; INTRAVENOUS at 14:50

## 2020-08-24 RX ADMIN — LIDOCAINE HYDROCHLORIDE 100 MG: 20 INJECTION, SOLUTION EPIDURAL; INFILTRATION; INTRACAUDAL; PERINEURAL at 14:53

## 2020-08-24 RX ADMIN — DEXAMETHASONE SODIUM PHOSPHATE 4 MG: 4 INJECTION, SOLUTION INTRAMUSCULAR; INTRAVENOUS at 15:08

## 2020-08-24 RX ADMIN — SODIUM CHLORIDE, POTASSIUM CHLORIDE, SODIUM LACTATE AND CALCIUM CHLORIDE: 600; 310; 30; 20 INJECTION, SOLUTION INTRAVENOUS at 18:42

## 2020-08-24 RX ADMIN — SODIUM CHLORIDE, POTASSIUM CHLORIDE, SODIUM LACTATE AND CALCIUM CHLORIDE: 600; 310; 30; 20 INJECTION, SOLUTION INTRAVENOUS at 16:12

## 2020-08-24 RX ADMIN — OXYCODONE HYDROCHLORIDE 10 MG: 5 SOLUTION ORAL at 17:03

## 2020-08-24 RX ADMIN — ONDANSETRON 4 MG: 2 INJECTION INTRAMUSCULAR; INTRAVENOUS at 16:11

## 2020-08-24 RX ADMIN — FELODIPINE 5 MG: 5 TABLET, EXTENDED RELEASE ORAL at 18:42

## 2020-08-24 RX ADMIN — POVIDONE-IODINE 15 ML: 10 SOLUTION TOPICAL at 13:50

## 2020-08-24 RX ADMIN — HYDRALAZINE HYDROCHLORIDE 5 MG: 20 INJECTION INTRAMUSCULAR; INTRAVENOUS at 17:19

## 2020-08-24 RX ADMIN — CEFAZOLIN 2 G: 1 INJECTION, POWDER, FOR SOLUTION INTRAVENOUS at 15:05

## 2020-08-24 RX ADMIN — DOCUSATE SODIUM 100 MG: 100 CAPSULE, LIQUID FILLED ORAL at 18:41

## 2020-08-24 RX ADMIN — FENTANYL CITRATE 50 MCG: 50 INJECTION, SOLUTION INTRAMUSCULAR; INTRAVENOUS at 17:23

## 2020-08-24 RX ADMIN — TRANEXAMIC ACID 1000 MG: 100 INJECTION, SOLUTION INTRAVENOUS at 16:37

## 2020-08-24 RX ADMIN — FENTANYL CITRATE 50 MCG: 50 INJECTION, SOLUTION INTRAMUSCULAR; INTRAVENOUS at 15:17

## 2020-08-24 RX ADMIN — LIDOCAINE HYDROCHLORIDE 0.5 ML: 10 INJECTION, SOLUTION INFILTRATION; PERINEURAL at 13:50

## 2020-08-24 RX ADMIN — HYDROMORPHONE HYDROCHLORIDE 0.5 MG: 2 INJECTION, SOLUTION INTRAMUSCULAR; INTRAVENOUS; SUBCUTANEOUS at 16:37

## 2020-08-24 RX ADMIN — CEFAZOLIN 2 G: 10 INJECTION, POWDER, FOR SOLUTION INTRAVENOUS; PARENTERAL at 23:40

## 2020-08-24 RX ADMIN — FENTANYL CITRATE 50 MCG: 50 INJECTION, SOLUTION INTRAMUSCULAR; INTRAVENOUS at 14:33

## 2020-08-24 ASSESSMENT — FIBROSIS 4 INDEX: FIB4 SCORE: 3.36

## 2020-08-24 NOTE — OR NURSING
1647: To PACU from OR via bed, drowsy, c/o pain and nausea, medicated by anesthesiologist for same, respirations spontaneous and non-labored. Plan to keep pt in PACU for full hour per [facility] protocol. Icepack applied over c/d/i L knee surgical dressings.   1656: Xrays in progress  1700: Plan further analgesia for severe pain. Tolerates po water, full dentures returned to mouth.  1715: Pain persists, plan analgesia and antihypetensive as pt states she did not take her BP meds today.  1730: Pt verbalizes decreasing pain, now dozing intermittently so not medicated further.    1745: No change in surgical site assessment.  1747: Meets criteria to transfer to [facility]. Awaiting return call from [facility] RN

## 2020-08-24 NOTE — OR SURGEON
Immediate Post OP Note    PreOp Diagnosis: Severe OA left knee    PostOp Diagnosis: same    Procedure(s):  ARTHROPLASTY, KNEE, TOTAL - Wound Class: Clean    Surgeon(s):  Víctor Faustin M.D.  Assist Janina/Judith    Anesthesiologist/Type of Anesthesia:  Anesthesiologist: Marija Mederos M.D./General    Surgical Staff:  Circulator: Kaylee Haider R.N.  Limb Ramos: Azar Rucker  Scrub Person: Desean Arcos Assist: Cade Wong, C.N.A.    Specimens removed if any:  * No specimens in log *    Estimated Blood Loss: 300 cc    Findings: OA    Complications: none        8/24/2020 4:47 PM Víctor Faustin M.D.

## 2020-08-24 NOTE — ANESTHESIA TIME REPORT
Anesthesia Start and Stop Event Times     Date Time Event    8/24/2020 1435 Ready for Procedure     1449 Anesthesia Start     1650 Anesthesia Stop        Responsible Staff  08/24/20    Name Role Begin End    Marija Mederos M.D. Anesth 1449 1650        Preop Diagnosis (Free Text):  Pre-op Diagnosis     OSTEOARTHRITIS OF KNEE        Preop Diagnosis (Codes):    Post op Diagnosis  Osteoarthritis of knee      Premium Reason  A. 3PM - 7AM    Comments:

## 2020-08-24 NOTE — ANESTHESIA PROCEDURE NOTES
Peripheral Block    Date/Time: 8/24/2020 2:33 PM  Performed by: Marija Mederos M.D.  Authorized by: Marija Mederos M.D.     Start Time:  8/24/2020 2:33 PM  End Time:  8/24/2020 2:37 PM  Reason for Block: at surgeon's request and post-op pain management    patient identified, IV checked, site marked, risks and benefits discussed, surgical consent, monitors and equipment checked, pre-op evaluation and timeout performed    Patient Position:  Supine  Prep: ChloraPrep    Monitoring:  Heart rate, continuous pulse ox and cardiac monitor  Block Region:  Lower Extremity  Lower Extremity - Block Type:  Selective FEMORAL nerve block at the Adductor Canal    Laterality:  Left  Procedures: ultrasound guided  Image captured, interpreted and electronically stored.  Local Infiltration:  Lidocaine  Strength:  1 %  Dose:  3 ml  Block Type:  Single-shot  Needle Length:  100mm  Needle Gauge:  21 G  Needle Localization:  Ultrasound guidance  Injection Assessment:  Negative aspiration for heme, incremental injection, local visualized surrounding nerve on ultrasound and paresthesia-transient/resolved  Evidence of intravascular injection: No

## 2020-08-24 NOTE — ANESTHESIA QCDR
2019 [facility] Clinical Data Registry (for Quality Improvement)     Postoperative nausea/vomiting risk protocol (Adult = 18 yrs and Pediatric 3-17 yrs)- (430 and 463)  General inhalation anesthetic (NOT TIVA) with PONV risk factors: Yes  Provision of anti-emetic therapy with at least 2 different classes of agents: Yes   Patient DID NOT receive anti-emetic therapy and reason is documented in Medical Record:  N/A    Multimodal Pain Management- (477)  Non-emergent surgery AND patient age >= 18: Yes  Use of Multimodal Pain Management, two or more drugs and/or interventions, NOT including systemic opioids: Yes  Exception: Documented allergy to multiple classes of analgesics: N/A    Smoking Abstinence (404)  Patient is current smoker (cigarette, pipe, e-cig, marijuanna): No  Elective Surgery:   Abstinence instructions provided prior to day of surgery:   Patient abstained from smoking on day of surgery:     Pre-Op Beta-Blocker in Isolated CABG (44)  Isolated CABG AND patient age >= 18: No  Beta-blocker admin within 24 hours of surgical incision:   Exception:of medical reason(s) for not administering beta blocker within 24 hours prior to surgical incision (e.g., not  indicated,other medical reason):     PACU assessment of acute postoperative pain prior to Anesthesia Care End- Applies to Patients Age = 18- (ABG7)  Initial PACU pain score is which of the following: >= 7/10  Patient unable to report pain score: N/A    Post-anesthetic transfer of care checklist/protocol to PACU/ICU- (426 and 427)  Upon conclusion of case, patient transferred to which of the following locations: PACU/Non-ICU  Use of transfer checklist/protocol: Yes  Exclusion: Service Performed in Patient Hospital Room (and thus did not require transfer): N/A  Unplanned admission to ICU related to anesthesia service up through end of PACU care- (MD51)  Unplanned admission to ICU (not initially anticipated at anesthesia start time): No

## 2020-08-24 NOTE — ANESTHESIA POSTPROCEDURE EVALUATION
Patient: April Alicia    Procedure Summary     Date: 08/24/20 Room / Location:  OR  / SURGERY [facility]    Anesthesia Start: 1449 Anesthesia Stop:     Procedure: ARTHROPLASTY, KNEE, TOTAL (Left ) Diagnosis: (OSTEOARTHRITIS OF KNEE)    Surgeon: Víctor Faustin M.D. Responsible Provider: Marija Mederos M.D.    Anesthesia Type: general, peripheral nerve block ASA Status: 4          Final Anesthesia Type: general, peripheral nerve block  Last vitals  BP   Blood Pressure: 129/89    Temp   36.7 °C (98.1 °F)    Pulse   Pulse: (!) 59   Resp   16    SpO2   98 %      Anesthesia Post Evaluation    Patient location during evaluation: PACU  Patient participation: complete - patient participated  Level of consciousness: awake and alert    Airway patency: patent  Anesthetic complications: no  Cardiovascular status: hemodynamically stable  Respiratory status: acceptable  Hydration status: euvolemic    PONV: none    patient able to participate, but full recovery from regional anesthesia has not occurred and is not expected within the stipulated timeframe for the completion of the evaluation       Nurse Pain Score: 8 (NPRS)

## 2020-08-24 NOTE — ANESTHESIA PROCEDURE NOTES
Airway    Date/Time: 8/24/2020 2:57 PM  Performed by: Marija Mederos M.D.  Authorized by: Marija Mederos M.D.     Location:  OR  Urgency:  Elective  Difficult Airway: No    Indications for Airway Management:  Anesthesia      Spontaneous Ventilation: absent    Sedation Level:  Deep  Preoxygenated: Yes    Patient Position:  Sniffing  Mask Difficulty Assessment:  2 - vent by mask + OA or adjuvant +/- NMBA  Final Airway Type:  Endotracheal airway  Final Endotracheal Airway:  ETT  Cuffed: Yes    Technique Used for Successful ETT Placement:  Video laryngoscopy  Devices/Methods Used in Placement:  Intubating stylet    Insertion Site:  Oral  Blade Type:  Glide  Laryngoscope Blade/Videolaryngoscope Blade Size:  3  ETT Size (mm):  7.0  Measured from:  Gums  ETT to Gums (cm):  21  Placement Verified by: auscultation and capnometry    Cormack-Lehane Classification:  Grade I - full view of glottis  Number of Attempts at Approach:  1  Ventilation Between Attempts:  BVM  Number of Other Approaches Attempted:  1  Unsuccessful Airway(s) Attempted:  SGA   Poor fit for both LMA size 4 and 5, easy BMV with OPA.

## 2020-08-24 NOTE — OR NURSING
Patient allergies and NPO status verified, home medication reconciliation completed and belongings secured. Patient verbalizes understanding of pain scale, expected course of stay and plan of care. Surgical site verified with patient.  IV access established. Sequentials placed on right leg.  Pt is not a good historian, and very focused on her phone.  Unable to obtain information about being prescribed Mupirocin  Nose ointment for a positive staph swab.

## 2020-08-25 PROBLEM — M17.12 PRIMARY OSTEOARTHRITIS OF LEFT KNEE: Status: ACTIVE | Noted: 2020-08-25

## 2020-08-25 LAB
ANION GAP SERPL CALC-SCNC: 8 MMOL/L (ref 7–16)
BUN SERPL-MCNC: 11 MG/DL (ref 8–22)
CALCIUM SERPL-MCNC: 7.9 MG/DL (ref 8.4–10.2)
CHLORIDE SERPL-SCNC: 106 MMOL/L (ref 96–112)
CO2 SERPL-SCNC: 24 MMOL/L (ref 20–33)
CREAT SERPL-MCNC: 0.62 MG/DL (ref 0.5–1.4)
ERYTHROCYTE [DISTWIDTH] IN BLOOD BY AUTOMATED COUNT: 50.9 FL (ref 35.9–50)
GLUCOSE SERPL-MCNC: 105 MG/DL (ref 65–99)
HCT VFR BLD AUTO: 27.2 % (ref 37–47)
HGB BLD-MCNC: 8.5 G/DL (ref 12–16)
MCH RBC QN AUTO: 27.5 PG (ref 27–33)
MCHC RBC AUTO-ENTMCNC: 30.9 G/DL (ref 33.6–35)
MCV RBC AUTO: 89.2 FL (ref 81.4–97.8)
PLATELET # BLD AUTO: 102 K/UL (ref 164–446)
PMV BLD AUTO: 10.8 FL (ref 9–12.9)
POTASSIUM SERPL-SCNC: 4.2 MMOL/L (ref 3.6–5.5)
RBC # BLD AUTO: 3.05 M/UL (ref 4.2–5.4)
SODIUM SERPL-SCNC: 138 MMOL/L (ref 135–145)
WBC # BLD AUTO: 7 K/UL (ref 4.8–10.8)

## 2020-08-25 PROCEDURE — 36415 COLL VENOUS BLD VENIPUNCTURE: CPT

## 2020-08-25 PROCEDURE — 80048 BASIC METABOLIC PNL TOTAL CA: CPT

## 2020-08-25 PROCEDURE — 700105 HCHG RX REV CODE 258: Performed by: ORTHOPAEDIC SURGERY

## 2020-08-25 PROCEDURE — 85027 COMPLETE CBC AUTOMATED: CPT

## 2020-08-25 PROCEDURE — 700111 HCHG RX REV CODE 636 W/ 250 OVERRIDE (IP): Performed by: ORTHOPAEDIC SURGERY

## 2020-08-25 PROCEDURE — 94664 DEMO&/EVAL PT USE INHALER: CPT

## 2020-08-25 PROCEDURE — 97162 PT EVAL MOD COMPLEX 30 MIN: CPT

## 2020-08-25 PROCEDURE — 97535 SELF CARE MNGMENT TRAINING: CPT

## 2020-08-25 PROCEDURE — 97110 THERAPEUTIC EXERCISES: CPT

## 2020-08-25 PROCEDURE — A9270 NON-COVERED ITEM OR SERVICE: HCPCS | Performed by: ORTHOPAEDIC SURGERY

## 2020-08-25 PROCEDURE — 97165 OT EVAL LOW COMPLEX 30 MIN: CPT

## 2020-08-25 PROCEDURE — 770001 HCHG ROOM/CARE - MED/SURG/GYN PRIV*

## 2020-08-25 PROCEDURE — 700102 HCHG RX REV CODE 250 W/ 637 OVERRIDE(OP): Performed by: ORTHOPAEDIC SURGERY

## 2020-08-25 RX ORDER — DIAZEPAM 5 MG/1
5 TABLET ORAL EVERY 8 HOURS PRN
Status: DISCONTINUED | OUTPATIENT
Start: 2020-08-25 | End: 2020-08-27 | Stop reason: HOSPADM

## 2020-08-25 RX ADMIN — OXYCODONE HYDROCHLORIDE 10 MG: 10 TABLET ORAL at 21:43

## 2020-08-25 RX ADMIN — ONDANSETRON 4 MG: 2 INJECTION INTRAMUSCULAR; INTRAVENOUS at 19:47

## 2020-08-25 RX ADMIN — GABAPENTIN 400 MG: 100 CAPSULE ORAL at 18:15

## 2020-08-25 RX ADMIN — HYDROMORPHONE HYDROCHLORIDE 0.5 MG: 1 INJECTION, SOLUTION INTRAMUSCULAR; INTRAVENOUS; SUBCUTANEOUS at 19:47

## 2020-08-25 RX ADMIN — LOSARTAN POTASSIUM 100 MG: 25 TABLET, FILM COATED ORAL at 06:17

## 2020-08-25 RX ADMIN — OXYCODONE HYDROCHLORIDE 10 MG: 10 TABLET ORAL at 14:53

## 2020-08-25 RX ADMIN — ONDANSETRON 4 MG: 2 INJECTION INTRAMUSCULAR; INTRAVENOUS at 08:22

## 2020-08-25 RX ADMIN — OXYCODONE HYDROCHLORIDE 10 MG: 10 TABLET ORAL at 11:24

## 2020-08-25 RX ADMIN — GABAPENTIN 400 MG: 100 CAPSULE ORAL at 06:17

## 2020-08-25 RX ADMIN — APIXABAN 5 MG: 5 TABLET, FILM COATED ORAL at 06:17

## 2020-08-25 RX ADMIN — DIAZEPAM 5 MG: 5 TABLET ORAL at 12:25

## 2020-08-25 RX ADMIN — FELODIPINE 5 MG: 5 TABLET, EXTENDED RELEASE ORAL at 18:15

## 2020-08-25 RX ADMIN — HYDROMORPHONE HYDROCHLORIDE 0.5 MG: 1 INJECTION, SOLUTION INTRAMUSCULAR; INTRAVENOUS; SUBCUTANEOUS at 06:26

## 2020-08-25 RX ADMIN — OXYCODONE HYDROCHLORIDE 10 MG: 10 TABLET ORAL at 08:23

## 2020-08-25 RX ADMIN — OXYCODONE HYDROCHLORIDE 10 MG: 10 TABLET ORAL at 18:14

## 2020-08-25 RX ADMIN — POTASSIUM CHLORIDE 10 MEQ: 1500 TABLET, EXTENDED RELEASE ORAL at 06:17

## 2020-08-25 RX ADMIN — OXYCODONE HYDROCHLORIDE 10 MG: 10 TABLET ORAL at 04:41

## 2020-08-25 RX ADMIN — MAGNESIUM GLUCONATE 500 MG ORAL TABLET 400 MG: 500 TABLET ORAL at 06:18

## 2020-08-25 RX ADMIN — ACETAMINOPHEN 650 MG: 325 TABLET, FILM COATED ORAL at 11:25

## 2020-08-25 RX ADMIN — METFORMIN HYDROCHLORIDE 500 MG: 500 TABLET, EXTENDED RELEASE ORAL at 18:15

## 2020-08-25 RX ADMIN — CEFAZOLIN 2 G: 10 INJECTION, POWDER, FOR SOLUTION INTRAVENOUS; PARENTERAL at 06:16

## 2020-08-25 RX ADMIN — SENNOSIDES-DOCUSATE SODIUM TAB 8.6-50 MG 1 TABLET: 8.6-5 TAB at 21:43

## 2020-08-25 RX ADMIN — ACETAMINOPHEN 650 MG: 325 TABLET, FILM COATED ORAL at 18:15

## 2020-08-25 RX ADMIN — OMEPRAZOLE 40 MG: 20 CAPSULE, DELAYED RELEASE ORAL at 06:17

## 2020-08-25 RX ADMIN — DOCUSATE SODIUM 100 MG: 100 CAPSULE, LIQUID FILLED ORAL at 06:17

## 2020-08-25 RX ADMIN — OXYCODONE HYDROCHLORIDE 10 MG: 10 TABLET ORAL at 00:17

## 2020-08-25 ASSESSMENT — COGNITIVE AND FUNCTIONAL STATUS - GENERAL
PERSONAL GROOMING: A LITTLE
TOILETING: A LITTLE
MOVING FROM LYING ON BACK TO SITTING ON SIDE OF FLAT BED: A LITTLE
EATING MEALS: A LITTLE
MOBILITY SCORE: 16
SUGGESTED CMS G CODE MODIFIER MOBILITY: CK
DAILY ACTIVITIY SCORE: 19
MOVING TO AND FROM BED TO CHAIR: A LOT
CLIMB 3 TO 5 STEPS WITH RAILING: A LOT
TURNING FROM BACK TO SIDE WHILE IN FLAT BAD: A LITTLE
WALKING IN HOSPITAL ROOM: A LITTLE
DRESSING REGULAR LOWER BODY CLOTHING: A LITTLE
STANDING UP FROM CHAIR USING ARMS: A LITTLE
HELP NEEDED FOR BATHING: A LITTLE
SUGGESTED CMS G CODE MODIFIER DAILY ACTIVITY: CK

## 2020-08-25 ASSESSMENT — GAIT ASSESSMENTS
DEVIATION: ANTALGIC;STEP TO
GAIT LEVEL OF ASSIST: MINIMAL ASSIST
ASSISTIVE DEVICE: FRONT WHEEL WALKER
DISTANCE (FEET): 100

## 2020-08-25 ASSESSMENT — ACTIVITIES OF DAILY LIVING (ADL): TOILETING: INDEPENDENT

## 2020-08-25 NOTE — PROGRESS NOTES
"Post op day # 1    No New Complaints, mild pain, well controlled.  Ambulating well    Blood pressure 127/64, pulse (!) 57, temperature 37 °C (98.6 °F), temperature source Oral, resp. rate 18, height 1.702 m (5' 7\"), weight 89.2 kg (196 lb 10.4 oz), last menstrual period 06/02/2008, SpO2 97 %, not currently breastfeeding.    Neurovascular intact  Wound Clean and Dry  ROM 0/2/80    Recent Labs     08/25/20  0418   WBC 7.0   RBC 3.05*   HEMOGLOBIN 8.5*   HEMATOCRIT 27.2*   MCV 89.2   MCH 27.5   MCHC 30.9*   RDW 50.9*   PLATELETCT 102*   MPV 10.8     Recent Labs     08/25/20  0418   SODIUM 138   POTASSIUM 4.2   CHLORIDE 106   CO2 24   GLUCOSE 105*   BUN 11   CREATININE 0.62   CALCIUM 7.9*       Plan: Home after pm PT.  WBAT on left, aggressive ROM left knee.  Please remove IV and change dressing before discharge.  I will arrange outpt PT.  May shower with incision covered. Will resume preop Eliquis.  "

## 2020-08-25 NOTE — CARE PLAN
Problem: Safety  Goal: Will remain free from falls  Outcome: PROGRESSING AS EXPECTED  Intervention: Implement fall precautions  Flowsheets  Taken 8/25/2020 0932  Bed Alarm: Yes - Alarm On  IV Pole on Same Side of Bed as Bathroom: Yes  Taken 8/25/2020 0830  Environmental Precautions:   Treaded Slipper Socks on Patient   Personal Belongings, Wastebasket, Call Bell etc. in Easy Reach   Report Given to Other Health Care Providers Regarding Fall Risk   Bed in Low Position   Communication Sign for Patients & Families   Mobility Assessed & Appropriate Sign Placed  Note: Patient educated and understands the fall precautions in place to prevent falls.  Bed alarm is on, IV pole closest to bathroom, treaded slipper socks on, and bedrails closest to bathroom down.  Patient also educated and understands the use of the call button for any assistance with mobility.      Problem: Venous Thromboembolism (VTW)/Deep Vein Thrombosis (DVT) Prevention:  Goal: Patient will participate in Venous Thrombosis (VTE)/Deep Vein Thrombosis (DVT)Prevention Measures  Outcome: PROGRESSING AS EXPECTED  Intervention: Ensure patient wears graduated elastic stockings (THEODORE hose) and/or SCDs, if ordered, when in bed or chair (Remove at least once per shift for skin check)  Flowsheets (Taken 8/25/2020 0830)  Mechanical Prophylaxis:   THEODORE Hose (Graduated Compression Stockings)   SCDs, Sequential Compression Device  THEODORE Hose (Graduated Compression Stockings): On  SCDs, Sequential Compression Device: On  Note: Patient is wearing THEODORE hose and SCDs while in bed or chair.  Restarted home eliquis dosing this AM for DVT prophylaxis.

## 2020-08-25 NOTE — PROGRESS NOTES
Received report from Mark/RN.  Assumed patient care.  Patient is A&Ox4, resting comfortably in chair.  Patient on RA.  No signs of SOB/respiratory distress.  Denied pain.  Assessment completed, labs noted, VSS.  Surgical dressing to L knee in place CDI, +CMS, + pulse, able to wiggle toes and dorsi/plantar flex.  Polar ice & SCDs on.  Educated patient regarding the importance to call for assistance. Fall precautions in place. Bed locked & at lowest position. Call light and personal belongings within reach. Continue to monitor.

## 2020-08-25 NOTE — CARE PLAN
Problem: Communication  Goal: The ability to communicate needs accurately and effectively will improve  Outcome: PROGRESSING AS EXPECTED     Problem: Safety  Goal: Will remain free from falls  Outcome: PROGRESSING AS EXPECTED     Problem: Venous Thromboembolism (VTW)/Deep Vein Thrombosis (DVT) Prevention:  Goal: Patient will participate in Venous Thrombosis (VTE)/Deep Vein Thrombosis (DVT)Prevention Measures  Outcome: PROGRESSING AS EXPECTED     Problem: Knowledge Deficit  Goal: Knowledge of disease process/condition, treatment plan, diagnostic tests, and medications will improve  Outcome: PROGRESSING AS EXPECTED  Goal: Knowledge of the prescribed therapeutic regimen will improve  Outcome: PROGRESSING AS EXPECTED

## 2020-08-25 NOTE — THERAPY
Physical Therapy   Initial Evaluation     Patient Name: April Alicia  Age:  60 y.o., Sex:  female  Medical Record #: [id number]  Today's Date: 8/25/2020     Precautions: Weight Bearing As Tolerated Left Lower Extremity    Assessment  Patient is 60 y.o. female s/p L TKA POD#1. Pt is having significant pain, primarily L quads limiting safe mobility and affecting pts ability to tolerate L knee ROM exercises. L quads with very weak contraction noted, pt only able to tolerate 35 deg knee flex L LE. Pt has a flight of stairs to enter apt at home, pt unable to perform a step/steps today. Pt also reports spouse at home is not very helpful and she is concerned she won't be able to take care of herself. Given all of these factors, pt may benefit from SNF prior to DC home to maximize independence and ROM L knee.    Plan    Recommend Physical Therapy daily until therapy goals are met for the following treatments:  Bed Mobility, Gait Training, Neuro Re-Education / Balance, Stair Training, Therapeutic Activities and Therapeutic Exercises    DC Equipment Recommendations: Front-Wheel Walker  Discharge Recommendations: Recommend post-acute placement for additional physical therapy services prior to discharge home(may need SNF if mobility does not improve)        08/25/20 1108   Prior Living Situation   Housing / Facility 2 Story Apartment / Condo   Steps Into Home 15   Steps In Home 0   Rail Both Rail (Steps into Home)   Equipment Owned Crutches;Tub / Shower Seat;Raised Toilet Seat With Arms   Lives with - Patient's Self Care Capacity Spouse   Comments Pt is in small studio apt with spouse, reports spouse is generally not very helpful   Prior Level of Functional Mobility   Bed Mobility Independent   Transfer Status Independent   Ambulation Independent   Assistive Devices Used Crutches   Stairs Independent   Gait Analysis   Gait Level Of Assist Minimal Assist   Assistive Device Front Wheel Walker   Distance (Feet) 100   # of  Times Distance was Traveled 1   Deviation Antalgic;Step To   Level of Assist with Stairs Unable to Participate   Weight Bearing Status WBAT L LE   Bed Mobility    Supine to Sit Minimal Assist   Sit to Supine Minimal Assist   Functional Mobility   Sit to Stand Supervised   Short Term Goals    Short Term Goal # 1 Pt will be able to perform bed mobility and sup <> sit Alyse in 6 visits.   Short Term Goal # 2 Pt will be able to perform sit <> stand and transfer Alyse in 6 visits so can DC home safely.   Short Term Goal # 3 Pt will be able to ambulate 150 ft with FWW Alyse in 6 visits so can DC home safely.   Short Term Goal # 4 Pt will be able to go up/down flight of steps SBA in 6 visits so can DC home safely.

## 2020-08-25 NOTE — RESPIRATORY CARE
COPD EDUCATION by COPD CLINICAL EDUCATOR  8/25/2020  at  1:34 PM by Mary Jerome, RRT     Patient interviewed by COPD education team.  Patient unable to participate in full program.  Short intervention has been conducted.  A comprehensive packet including information about COPD, treatments what is COPD (how the lungs work), daily medications rescue and maintenance, breathing techniques, infection prevention and oxygen safety were covered in detail.  A comprehensive packet including information about COPD, treatments, and oxygen safety was given.  Provided spacer with instruction for use, care, and cleaning.  Patient states quit smoking.

## 2020-08-25 NOTE — FACE TO FACE
Face to Face Note  -  Durable Medical Equipment    Víctor Faustin M.D. - NPI: [id number]  I certify that this patient is under my care and that they had a durable medical equipment(DME)face to face encounter by myself that meets the physician DME face-to-face encounter requirements with this patient on:    Date of encounter:   Patient:                    MRN:                       YOB: 2020  April Alicia  [id number]  1960     The encounter with the patient was in whole, or in part, for the following medical condition, which is the primary reason for durable medical equipment:  Total Joint Replacement    I certify that, based on my findings, the following durable medical equipment is medically necessary:  Walkers.    HOME O2 Saturation Measurements:(Values must be present for Home Oxygen orders)         ,     ,         My Clinical findings support the need for the above equipment due to:  Abnormal Gait    Supporting Symptoms: Post left TKA

## 2020-08-25 NOTE — PROGRESS NOTES
Spoke with Dr. Faustin, updated that patient will stay tonight, let him know that we will reassess in the AM and that a SNF referral may be needed tomorrow.  Received a telephone order for valium.

## 2020-08-25 NOTE — PROGRESS NOTES
2000: pt is awake in bed. VSS. Pt c/o 7/10 pain in R knee. No n/v. Medicated per MAR. Pt's dressing is clean, dry, and intact. CMS is intact. Pt has ambulated and voided post-op. Fall precautions in place.

## 2020-08-25 NOTE — PROGRESS NOTES
Delivered and adjusted walker for pt, if pt has any questions regarding the walker or requires adjustments contact traction at [phone]

## 2020-08-25 NOTE — OP REPORT
DATE OF SERVICE:  08/24/2020    PREOPERATIVE DIAGNOSIS:  Severe osteoarthritis, left knee.    POSTOPERATIVE DIAGNOSIS:  Severe osteoarthritis, left knee.    PROCEDURE:  Left total knee replacement using a [employer] K[employer]ber[employer] II   system with a cemented size 5 femur, cemented size 6 tibia, a 13 mm spacer and   a cemented 38 mm oval patella.    SURGEON:  Víctor Faustin MD    ASSISTANT:  Barber. Of note, the assistants played a crucial   role in this procedure by helping with exposure and instrumentation for this   complex arthroplasty.    ANESTHESIA:  General per LMA.    ANESTHESIOLOGIST:  Marija Mederos MD    OPERATIVE INDICATIONS:  The patient is a 60-year-old  female   with a long history of severe osteoarthritis in both knees and is status post   right total knee replacement.  She has severe osteoarthritis in the left knee   and is no longer responding to conservative program, this included activity   modification, physical therapy, nonsteroidal anti-inflammatories and   corticosteroid injections.  She now is severely limited in her ability to   ambulate and uses crutches to get around and it is felt that a total knee   replacement on the left knee is indicated to maintain activity in this woman.    PROCEDURE IN DETAIL:  The patient was brought to the operating room and placed   on table in a supine position where a satisfactory general anesthetic agent   was administered per endotracheal tube.  The patient was given 2 grams of   Ancef, 1 gram of tranexamic acid IV prior to beginning the procedure.  The   left lower extremity was prepped with ChloraPrep, draped free in a sterile   fashion.  Leg was elevated for exsanguination and tourniquet inflated to 250   mmHg.  An 18 cm longitudinal incision was made, carried sharply through skin   and subcutaneous tissue and down to the extensor mechanism, which was exposed.    A medial parapatellar incision was made.  There was  marked degenerative   change within the joint with large osteophytes diffusely.  The fat pad was   excised.  A minimal medial release was performed.  The anterior horn of the   lateral meniscus was resected.  There was no medial meniscus present and there   was no ACL present.  The distal femur was cannulated with a drill and a long   intramedullary guide sneha was placed with the 5-degree valgus alignment guide.    This was appropriately positioned and secured.  The size 5 femur was felt   appropriate from intraoperative measurements.  The initial anterior femoral   cut was made.  The distal femoral cutting guide was placed and secured and a   distal femoral cut was made.  The size 5, 4-in-1 cutting guide was placed and   secured and final anterior and posterior as well as chamfer cuts were made.  A   size 5 trial was placed with excellent fit.  A PCL retractor was placed.  The   posterior horns of the lateral meniscus was resected.  The extramedullary   tibial guide was placed appropriately positioned and secured and a tibial cut   was made.  There were large osteophytes medially on the tibia and these were   removed with a rongeur.  A size 6 tibia was felt to be appropriate and the   tibial guide was placed and secured and the tibia was cannulated.  The tibial   trial was placed along with the femoral trial and 11 mm spacer.  The   appropriate rotation was obtained and marked.  The holes for the femoral lugs   were punched and the fins for the tibia were cut.  The tourniquet was released   and initial tourniquet time of 27 minutes.  There was moderate bleeding from   pretty much all of her tissues.  Hemostasis was obtained with electrocautery.    The patella was measured with calipers and patellar cutting guide was placed.    A patellar cut was made.  A 38 mm oval patella was felt to be appropriate   and the patellar guide was placed with the alignment holes drilled.  The wound   was copiously irrigated with normal  saline and hemostasis was again obtained   with the electrocautery.  The knee was re-elevated for exsanguination and   tourniquet reinflated to 250 mmHg.  A joint solution was created with 40 mL of   0.5% ropivacaine, 1 mL of epinephrine diluted in 90 mL with normal saline.  A   60 mL were placed in the periarticular tissues at this time with remaining 30   mL placed in the subcutaneous tissue at the time of closure.  A drill was   used to drill cement fixation holes through the sclerotic medial portion of   the tibial plateau.  The wound was copiously irrigated with normal saline   using a waterpik.  Two batches of high viscosity cement were mixed with 1 gram   of gentamicin per batch.  This was applied first to the tibia and tibial   component, which was placed and fully impacted with excess cement removed.  It   was then applied to the femur and femoral component, which was placed and   fully impacted with excess cement removed.  An 11 mm spacer was placed and the   knee was placed in full extension to compress the components.  Cement was   applied to the patella and patellar component and this was placed and held   with patellar clamp with excess cement removed.  The cement was allowed to   fully cure.  All excess cement was then carefully removed.  Trial reductions   with the 11 and 13 mm spacers were performed.  The 13 mm spacer gave full   extension, full flexion, and excellent stability to varus/valgus in both   flexion and extension.  The permanent 13 mm spacer was placed and seated.  The   patella tracked well and lateral release was not performed.  The wound was   again irrigated first with Betadine, then with normal saline using a waterpik.    The extensor mechanism was closed with #2 PDO Quill suture, subcutaneous   tissue with 2-0 Monocryl, and the skin with staples.  A sterile Aquacel   dressing was applied, held in place with an Ace wrap.  The tourniquet was   released.  Total tourniquet time of 71  minutes.  The patient was given a   second dose of tranexamic acid IV prior at the conclusion of the procedure.    The patient was awakened, extubated in the operating room, taken to recovery   room in stable condition.  Sponge and needle counts were correct.  There were   no identified intraoperative complications.       ____________________________________     MD VARGAS Hartman / SABINE    DD:  08/24/2020 16:46:45  DT:  08/24/2020 20:33:40    D#:  [id number]  Job#:  [id number]    cc: NAKIA Rubin CST FA

## 2020-08-25 NOTE — THERAPY
"Occupational Therapy   Initial Evaluation     Patient Name: April Alicia  Age:  60 y.o., Sex:  female  Medical Record #: [id number]  Today's Date: 8/25/2020     Precautions  Precautions: Weight Bearing As Tolerated Left Lower Extremity    Assessment  Patient is 60 y.o. female admit for L TKA.  Pt's discharge complicated by pt having to go stay at a second story apt with a flight of stairs to enter instead of to her daughters as originally planned.  Will have assist from spouse who pt states is NOT very helpful.  Pt overall is tolerating simple ADl's and limited mobility safely with FWW, needs cues and repetition.  Advised to sponge bathe as long as possible for safety with bathing.  D/c will be dependent on how pt does with PT.  No apparent OT needs at this time in this setting.    Plan    Recommend Occupational Therapy for Evaluation only    DC Equipment Recommendations: (SOULEYMANE Esparza)  Discharge Recommendations: Anticipate that the patient will have no further occupational therapy needs after discharge from the hospital        08/25/20 1000   Prior Living Situation   Prior Services Intermittent Physical Support for ADL Per Family   Housing / Facility 2 Story Apartment / Condo   Steps Into Home 15   Bathroom Set up Bathtub / Shower Combination;Shower Chair   Equipment Owned Front-Wheel Walker;Crutches;Tub / Shower Seat;Raised Toilet Seat With Arms  (FWW issued here today)   Lives with - Patient's Self Care Capacity Spouse   Comments Pt states she has been living with dtr but they are having home remodeled, so she will be staying with \"my man\" in his apartment.  (her \"man\" is her spouse but she has been living with dtr as spouse is \"not helpful\" per pt   Prior Level of ADL Function   Self Feeding Independent   Grooming / Hygiene Independent   Bathing Independent   Dressing Independent   Toileting Independent   Comments Pt has been using crutches for mobility for the last year   Prior Level of IADL " Function   Medication Management Independent   Laundry Requires Assist   Kitchen Mobility Requires Assist   Finances Requires Assist   Home Management Requires Assist   Shopping Requires Assist   Prior Level Of Mobility Supervision With Device in Home   Driving / Transportation Relatives / Others Provide Transportation   Occupation (Pre-Hospital Vocational) Unable To Determine At This Time   Leisure Interests Unable To Determine At This Time   Comments Dtr has been helping with IADLs because she cant do them because of using crutches   Cognition    Cognition / Consciousness X   Speech/ Communication Hard of Hearing   Level of Consciousness Alert   Safety Awareness Impaired   New Learning Impaired   Comments Pt needs repetition of instructions multiple times.  Asking for odd things like a bedpan for home.  Offered a female urinal instead.  Pt had previous knee surgery but doesn't appear confident at this time   ADL Assessment   Grooming Supervision;Standing   Bathing   (educated, but advised to sponge bathe a while)   Upper Body Dressing Modified Independent   Lower Body Dressing Minimal Assist   Toileting   (NT- reports urinary incontinence, requested bedpan)   Functional Mobility   Sit to Stand Supervised   Bed, Chair, Wheelchair Transfer Supervised   Transfer Method Stand Step   Mobility Close supervision from 1 side of bed around to chair on other side

## 2020-08-25 NOTE — DISCHARGE INSTRUCTIONS
Home after pm PT.  WBAT on left, aggressive ROM left knee.  I will arrange outpt PT.  May shower with incision covered. Will resume preop Eliquis.    Discharge Instructions    Discharged to home by car with relative. Discharged via wheelchair, hospital escort: Yes.  Special equipment needed: Walker    Be sure to schedule a follow-up appointment with your primary care doctor or any specialists as instructed.     Discharge Plan:        I understand that a diet low in cholesterol, fat, and sodium is recommended for good health. Unless I have been given specific instructions below for another diet, I accept this instruction as my diet prescription.   Other diet: Regular    Special Instructions: Discharge instructions for the Orthopedic Patient    Follow up with Primary Care Physician within 2 weeks of discharge to home, regarding:  Review of medications and diagnostic testing.  Surveillance for medical complications.  Workup and treatment of osteoporosis, if appropriate.     -Is this a Hip/Knee/Shoulder Joint Replacement patient? Yes   TOTAL KNEE REPLACEMENT, AFTER-CARE GUIDELINES     These instructions provide you with information on caring for yourself and your knee after surgery. Your health care provider may also give you instructions that are more specific. Your treatment was planned and performed according to current medical practices but problems sometimes occur. Call your health care provider if you have any problems or questions.     WHAT TO EXPECT AFTER THE PROCEDURE   After your procedure, your knee will typically be stiff, sore, and bruised. This will improve over time.     Pain   · Follow your home pain management plan as discussed with your nurse and as directed by your provider.   · It is important to follow any scheduled pain medications for maximal pain relief.   · If prescribed opioid medication, the goal is to use opioids only as needed and to wean off prescription pain medicine as soon as possible.    · Ice can be used for pain control.   · Put ice in a plastic bag.   · Place a towel between your skin and the bag.   · Leave the ice on for 20 minutes, 2-3 times a day at a minimum.   · Most patients are off the pain pills by 3 weeks. If your pain continues to be severe, follow up with your provider.     Infection   Knee joint infections occur in fewer than 2% of patients. The most common causes of infection following total knee replacement surgery are from bacteria that enter the bloodstream during dental procedures, urinary tract infections, or skin infections. These bacteria can lodge around your knee replacement and cause an infection.   · Keep the incision as clean and dry as possible.   · Always wash your hands before touching your incision.   · Avoid dental care for 3 months after surgery. Your provider may recommend taking a dose of antibiotics an hour prior to any dental procedure. After 2 years, most providers recommend antibiotics only before an extensive procedure. Ask your provider what they recommend.   · Signs and symptoms of infection include low-grade fever, redness, pain, swelling and drainage from your incision. Notify your provider IMMEDIATELY if you develop ANY of these symptoms.     Post op Disturbances   · Bowel Habits - Constipation is extremely common and caused by a combination of anesthesia, lack of mobility, dehydration and pain medicine. Use stool softeners or laxatives if necessary. It is important not to ignore this problem as bowel obstructions can be a serious complication after joint replacement surgery.   · Mood/Energy Level - Many patients experience a lack of energy and endurance for up to 2-3 months after surgery. Some people feel down and can even become depressed. This is likely due to postoperative anemia, change in activity level, lack of sleep, pain medicine and just the emotional reaction to the surgery itself that is a big disruption in a person’s life. This usually  passes. If symptoms persist, follow up with your primary care provider.  · Returning to Work - Your provider will give you specific instructions based on your profession. Generally, if you work a sedentary job requiring little standing or walking, most patients may return within 2-6 weeks. Manual labor jobs involving walking, lifting and standing may take 3-4 months. Your provider’s office can provide a release to part-time or light duty work early on in your recovery and progress you to full duty as able.   · Driving - You can begin driving once cleared by your provider, provided you are no longer taking narcotic pain medication or any other medications that impair driving. Discuss the length of time expected with your provider as returning to driving depends on things such as your vehicle, which knee was replaced (right or left), and knee motion, strength and reflexes returning appropriately.   · Avoiding falls - A fall during the first few weeks after surgery can damage your new knee and may result in a need for further surgery.  throw rugs and tack down loose carpeting. Be aware of floor hazards such as pets, small objects or uneven surfaces. Notify your provider of any falls.   · Airport Metal Detectors - The sensitivity of metal detectors varies and it is likely that your prosthesis will cause an alarm. Inform the  of your artificial joint.     Diet   · Resume your normal diet as tolerated.   · It is important to achieve a healthy nutritional status by eating a well-balanced diet on a regular basis.   · Your provider may recommend that you take iron and vitamin supplements.   · Continue to drink plenty of fluids.     Shower/Bathing   · You may shower as soon as you get home from the hospital unless otherwise instructed.   · Keep your incision out of water to prevent infection. To keep the incision dry when showering, cover it with a plastic bag or plastic wrap. If your bandage is  waterproof, this may not be necessary. o Pat incision dry if it gets wet. Do not rub. Notify your provider.   · Do not submerge in a bath until cleared by your provider. Your staples must be out and the incision completely healed.     Dressing Change: Only change your dressing if directed by your provider.   · Wash hands.   · Open all dressing change materials.   · Remove old dressing and discard.   · Inspect incision for signs of irritation or infection including redness, increase in clear drainage, yellow/green drainage, odor and surrounding skin hot to touch. Notify your provider if present.   ·  the new dressing by one corner and lay over the incision. Be careful not to touch the inside of the dressing that will lay over the incision.   · Secure in place as instructed. Swelling/Bruising   · Swelling is normal after knee replacement and can involve the thigh, knee, calf and foot.   · Swelling can last from 3-6 months.   · To reduce swelling, elevate your leg higher than your heart while reclining. The first week you are home you should elevate your leg an equal amount of time as you are active.   · The swelling is usually worse after you go home since you are upright for longer periods of time.   · Bruising often does not appear until after you arrive home and can be quite dramatic- appearing purple, black, or green. Bruising is typically not concerning and will subside without any treatment.     Blood Clot Prevention   Your treatment plan includes multiple preventative measures to decrease the risk of blood clots in the legs (DVTs) and the less common, but serious, clots that travel to the lungs (pulmonary emboli). Most patients are at standard risk for them, but people who are at higher risk include those who have had previous clots, a family history of clotting, smoking, diabetes, obesity, advanced age, use estrogen and/or live a sedentary lifestyle.     · Signs of blood clots in legs include - Swelling  in thigh, calf or ankle that does not go down with elevation. Pain, heat and tenderness in calf, back of calf or groin area. NOTE: blood clots can occur in either leg.   · Signs of blood clots in lungs include - Sudden increased shortness of breath, sudden onset of chest pain, and localized chest pain with coughing.   · If you experience any of the above symptoms, notify your provider and seek medical attention immediately.   · You received anticoagulant therapy (blood thinners) in the hospital. Continue the prescribed blood-thinning medication at home, as directed by your provider.   · Your risk for developing a clot continues for up to 2-3 months after surgery. Avoid prolonged sitting and dehydration (long air trips and car trips). If you do take a trip during this time, please get up, move around every 1-1.5 hours, and discuss all travel plans with your provider.     Activity   Once home, stay active. The key is not to overdo it. While you can expect some good days and some bad days, you should notice a gradual improvement and a gradual increase in your endurance over the next 6 to 12 months. Exercise is a critical component of recovery, particularly during the first few weeks after surgery.     · Normal activities of daily living - Expect to resume most within 3 to 6 weeks following surgery. Some pain with activity and at night is common for several weeks after surgery. Walk as much as you like once your doctor gives permission to proceed, but remember that walking is no substitute for the exercises your doctor and physical therapist prescribe. Use a walker, crutches or cane to assist with walking until you can walk smoothly (minimal or no limp) without assistance.   · Physical Therapy Exercises - Follow your home exercise program as instructed by your physical therapist during your hospital stay. Call and set up outpatient physical therapy appointments per your provider’s recommendations. Physical therapy after  the hospital stay focuses on increasing your range of motion, strengthening your muscles and improving your gait/walking pattern. Contact your provider for the referral to outpatient physical therapy if you have not yet received this. -   · Riding a stationary bicycle can help maintain muscle tone and keep your knee flexible. Begin stationary bicycling as directed by your physical therapist or provider.   · Sexual Activity - Your provider can tell you when it safe to resume sexual activity.   · Sleeping Positions - You can safely sleep on your back, on either side, or on your stomach.   · Other Activities - Lower impact activities are preferred. Consult your provider if you have specific questions.     When to Call the Doctor   Call the provider if you experience:   · Fever over 100.5° F   · Increased pain, drainage, redness, odor or heat around the incision area   · Shaking chills   · Increased knee pain with activity and rest   · Increased pain in calf, tenderness or redness above or below the knee   · Increased swelling of calf, ankle, foot   · Sudden increased shortness of breath, sudden onset of chest pain, localized chest pain with coughing   · Incision opening   Or, if there are any questions or concerns about medications or care.     Infection statistic resource:   https://www.[facility]/contents/prosthetic-joint-infection-epidemiology-microbiology-clinical-manifestations-and-diagnosis     -Is this patient being discharged with medication to prevent blood clots?  No    · Is patient discharged on Warfarin / Coumadin?   No     Depression / Suicide Risk    As you are discharged from this [facility] facility, it is important to learn how to keep safe from harming yourself.    Recognize the warning signs:  · Abrupt changes in personality, positive or negative- including increase in energy   · Giving away possessions  · Change in eating patterns- significant weight changes-  positive or negative  · Change in  sleeping patterns- unable to sleep or sleeping all the time   · Unwillingness or inability to communicate  · Depression  · Unusual sadness, discouragement and loneliness  · Talk of wanting to die  · Neglect of personal appearance   · Rebelliousness- reckless behavior  · Withdrawal from people/activities they love  · Confusion- inability to concentrate     If you or a loved one observes any of these behaviors or has concerns about self-harm, here's what you can do:  · Talk about it- your feelings and reasons for harming yourself  · Remove any means that you might use to hurt yourself (examples: pills, rope, extension cords, firearm)  · Get professional help from the community (Mental Health, Substance Abuse, psychological counseling)  · Do not be alone:Call your Safe Contact- someone whom you trust who will be there for you.  · Call your local CRISIS HOTLINE 714-3146 or 379-032-0642  · Call your local Children's Mobile Crisis Response Team [place] (003) 544-2307 or www.GHash.IO  · Call the toll free National Suicide Prevention Hotlines   · National Suicide Prevention Lifeline 502-871-VTUR (9847)  · National Hope Line Network 800-SUICIDE (216-0120)            Discharge Education for patients on SHAYNE (Obstructive Sleep Apnea) Protocol    Prior to receiving sedation or anesthesia, we screen all patients for Obstructive Sleep Apnea.  During your screening, you were identified as having Obstructive Sleep Apnea(SHAYNE).    What is Obstructive Sleep Apnea?  Sleep apnea (AP-ne-ah) is a common disorder which involves breathing pauses that occur during sleep.  These can last from 10 seconds to a minute or longer.  Normal breathing resumes often with a loud snort or choking sound.    Sleep apnea occurs in all age groups and both genders but is more common in men and people over 40 years of age.  It has been estimated that as many as 18 million Americans have sleep apnea.  Most people who have sleep apnea don’t know  they have it because it only occurs during sleep.  A family member and/or bed partner may first notice the signs of sleep apnea.  Sleep apnea is a chronic (ongoing) condition that disrupts the quality and quantity of your sleep repeatedly throughout the night.  This often results in excessive daytime sleepiness or fatigue during the day.  It may also contribute to high blood pressure, heart problems, and complications following medications used for surgery and procedures.     We recommend that you should be with an adult observer for at least 24 hours after your sedation/anesthesia.  If you have a CPAP machine, you should wear it during any sleep period (day or night) for the week following your procedure.  We encourage you to sleep on your side or in a sitting position, even with napping.  Lying flat on your back increases the risk of apnea and airway obstruction during your post procedure recovery period.    It is important to prevent over-sedation that could increase your risk for apnea.  Please take all pain medication as directed by your physician.  If you are not getting pain relief, please contact your physician to discuss possible approaches to relieving pain while minimizing medications that can affect your breathing and oxygen levels.

## 2020-08-25 NOTE — PROGRESS NOTES
"Pt arrived to room on [facility]. Awake, alert, appropriate and pleasant. Pt reports pain controlled at this time.  Aside from food, pt denies needs currently. VSS. /88   Pulse 63   Temp 36.7 °C (98 °F) (Axillary)   Resp 16   Ht 1.702 m (5' 7\")   Wt 89.2 kg (196 lb 10.4 oz)   LMP 06/02/2008   SpO2 98%   BMI 30.80 kg/m²      Diet ordered placed. Oriented to room. Call light in reach.   "

## 2020-08-26 PROCEDURE — 700111 HCHG RX REV CODE 636 W/ 250 OVERRIDE (IP): Performed by: ORTHOPAEDIC SURGERY

## 2020-08-26 PROCEDURE — 97110 THERAPEUTIC EXERCISES: CPT

## 2020-08-26 PROCEDURE — 94640 AIRWAY INHALATION TREATMENT: CPT

## 2020-08-26 PROCEDURE — 700102 HCHG RX REV CODE 250 W/ 637 OVERRIDE(OP): Performed by: ORTHOPAEDIC SURGERY

## 2020-08-26 PROCEDURE — 770001 HCHG ROOM/CARE - MED/SURG/GYN PRIV*

## 2020-08-26 PROCEDURE — A9270 NON-COVERED ITEM OR SERVICE: HCPCS | Performed by: ORTHOPAEDIC SURGERY

## 2020-08-26 PROCEDURE — 97116 GAIT TRAINING THERAPY: CPT

## 2020-08-26 PROCEDURE — 700105 HCHG RX REV CODE 258: Performed by: ORTHOPAEDIC SURGERY

## 2020-08-26 RX ADMIN — LOSARTAN POTASSIUM 100 MG: 25 TABLET, FILM COATED ORAL at 06:28

## 2020-08-26 RX ADMIN — METFORMIN HYDROCHLORIDE 500 MG: 500 TABLET, EXTENDED RELEASE ORAL at 18:31

## 2020-08-26 RX ADMIN — ACETAMINOPHEN 650 MG: 325 TABLET, FILM COATED ORAL at 06:27

## 2020-08-26 RX ADMIN — DOCUSATE SODIUM 100 MG: 100 CAPSULE, LIQUID FILLED ORAL at 18:31

## 2020-08-26 RX ADMIN — ACETAMINOPHEN 650 MG: 325 TABLET, FILM COATED ORAL at 00:53

## 2020-08-26 RX ADMIN — SENNOSIDES-DOCUSATE SODIUM TAB 8.6-50 MG 1 TABLET: 8.6-5 TAB at 19:56

## 2020-08-26 RX ADMIN — OXYCODONE HYDROCHLORIDE 10 MG: 10 TABLET ORAL at 04:40

## 2020-08-26 RX ADMIN — GABAPENTIN 400 MG: 100 CAPSULE ORAL at 18:31

## 2020-08-26 RX ADMIN — OXYCODONE HYDROCHLORIDE 10 MG: 10 TABLET ORAL at 10:04

## 2020-08-26 RX ADMIN — FELODIPINE 5 MG: 5 TABLET, EXTENDED RELEASE ORAL at 18:31

## 2020-08-26 RX ADMIN — ACETAMINOPHEN 650 MG: 325 TABLET, FILM COATED ORAL at 23:27

## 2020-08-26 RX ADMIN — SODIUM CHLORIDE, POTASSIUM CHLORIDE, SODIUM LACTATE AND CALCIUM CHLORIDE: 600; 310; 30; 20 INJECTION, SOLUTION INTRAVENOUS at 20:01

## 2020-08-26 RX ADMIN — GLYCOPYRROLATE 1 CAPSULE: 15.6 CAPSULE RESPIRATORY (INHALATION) at 19:56

## 2020-08-26 RX ADMIN — OXYCODONE HYDROCHLORIDE 10 MG: 10 TABLET ORAL at 13:09

## 2020-08-26 RX ADMIN — OXYCODONE HYDROCHLORIDE 10 MG: 10 TABLET ORAL at 19:02

## 2020-08-26 RX ADMIN — POTASSIUM CHLORIDE 10 MEQ: 1500 TABLET, EXTENDED RELEASE ORAL at 06:28

## 2020-08-26 RX ADMIN — ONDANSETRON 4 MG: 2 INJECTION INTRAMUSCULAR; INTRAVENOUS at 04:49

## 2020-08-26 RX ADMIN — OXYCODONE HYDROCHLORIDE 10 MG: 10 TABLET ORAL at 15:51

## 2020-08-26 RX ADMIN — ACETAMINOPHEN 650 MG: 325 TABLET, FILM COATED ORAL at 18:31

## 2020-08-26 RX ADMIN — OXYCODONE HYDROCHLORIDE 10 MG: 10 TABLET ORAL at 00:53

## 2020-08-26 RX ADMIN — OXYCODONE HYDROCHLORIDE 10 MG: 10 TABLET ORAL at 23:31

## 2020-08-26 RX ADMIN — ACETAMINOPHEN 650 MG: 325 TABLET, FILM COATED ORAL at 13:09

## 2020-08-26 RX ADMIN — APIXABAN 5 MG: 5 TABLET, FILM COATED ORAL at 10:04

## 2020-08-26 RX ADMIN — GABAPENTIN 400 MG: 100 CAPSULE ORAL at 06:27

## 2020-08-26 RX ADMIN — MAGNESIUM GLUCONATE 500 MG ORAL TABLET 400 MG: 500 TABLET ORAL at 06:27

## 2020-08-26 RX ADMIN — GLYCOPYRROLATE 1 CAPSULE: 15.6 CAPSULE RESPIRATORY (INHALATION) at 10:55

## 2020-08-26 RX ADMIN — OMEPRAZOLE 40 MG: 20 CAPSULE, DELAYED RELEASE ORAL at 06:27

## 2020-08-26 ASSESSMENT — COGNITIVE AND FUNCTIONAL STATUS - GENERAL
STANDING UP FROM CHAIR USING ARMS: A LITTLE
MOBILITY SCORE: 17
MOVING FROM LYING ON BACK TO SITTING ON SIDE OF FLAT BED: A LITTLE
CLIMB 3 TO 5 STEPS WITH RAILING: A LOT
TURNING FROM BACK TO SIDE WHILE IN FLAT BAD: A LITTLE
SUGGESTED CMS G CODE MODIFIER MOBILITY: CK
MOVING TO AND FROM BED TO CHAIR: A LITTLE
WALKING IN HOSPITAL ROOM: A LITTLE

## 2020-08-26 ASSESSMENT — GAIT ASSESSMENTS
GAIT LEVEL OF ASSIST: MINIMAL ASSIST
DEVIATION: ANTALGIC;DECREASED HEEL STRIKE;DECREASED TOE OFF
ASSISTIVE DEVICE: FRONT WHEEL WALKER
DISTANCE (FEET): 120

## 2020-08-26 NOTE — CARE PLAN
Problem: Communication  Goal: The ability to communicate needs accurately and effectively will improve  Outcome: PROGRESSING AS EXPECTED  Intervention: [place] patient and significant other/support system to call light to alert staff of needs  Note: Pt calls appropriately. Call light within reach.     Problem: Safety  Goal: Will remain free from falls  Outcome: PROGRESSING AS EXPECTED  Intervention: Assess risk factors for falls  Note: SBA with FWW. Pt calls appropriately     Problem: Pain Management  Goal: Pain level will decrease to patient's comfort goal  Outcome: PROGRESSING AS EXPECTED  Intervention: Follow pain managment plan developed in collaboration with patient and Interdisciplinary Team  Note: Providing pain medication per MAR

## 2020-08-26 NOTE — THERAPY
CYCLOBENZAPRINE REFILL  [facility]     Physical Therapy   Daily Treatment     Patient Name: April Alicia  Age:  60 y.o., Sex:  female  Medical Record #: [id number]  Today's Date: 8/26/2020     Precautions: Weight Bearing As Tolerated Left Lower Extremity    Assessment    Pt making slow progress but overall less pain today, improved gait quality and tolerance with FWW, improved ROM L knee although still only able to achieve ~55 deg flex L knee. Pt plans to DC home tomorrow, she will need to be able to perform a flight of steps to get into apartment.    Plan    Continue current treatment plan.    DC Equipment Recommendations: Front-Wheel Walker  Discharge Recommendations: Recommend home with outpt PT provided pt is able to perform stairs tomorrow       08/26/20 1141   Gait Analysis   Gait Level Of Assist Minimal Assist   Assistive Device Front Wheel Walker   Distance (Feet) 120   # of Times Distance was Traveled 1   Deviation Antalgic;Decreased Heel Strike;Decreased Toe Off   Level of Assist with Stairs Unable to Participate   Weight Bearing Status WBAT L LE   Skilled Intervention Verbal Cuing;Sequencing;Postural Facilitation   Bed Mobility    Supine to Sit   (NT, pt sitting up in chair)   Functional Mobility   Sit to Stand Supervised   Bed, Chair, Wheelchair Transfer Supervised   Transfer Method Stand Step  (with FWW)   Short Term Goals    Short Term Goal # 1 Pt will be able to perform bed mobility and sup <> sit Alyse in 6 visits.   Goal Outcome # 1 Progressing as expected   Short Term Goal # 2 Pt will be able to perform sit <> stand and transfer Alyse in 6 visits so can DC home safely.   Goal Outcome # 2 Progressing as expected   Short Term Goal # 3 Pt will be able to ambulate 150 ft with FWW Alyse in 6 visits so can DC home safely.   Goal Outcome # 3 Progressing as expected   Short Term Goal # 4 Pt will be able to go up/down flight of steps SBA in 6 visits so can DC home safely.   Goal Outcome # 4 Goal not met

## 2020-08-26 NOTE — DISCHARGE SUMMARY
DATE OF ADMISSION:  8/24/2020    DATE OF DISCHARGE:  8/25/2020    ADMITTING DIAGNOSIS:  Severe osteoarthritis, left knee.    DISCHARGE DIAGNOSIS:  Severe osteoarthritis, left knee.    PROCEDURE:  Left total knee replacement on 8/24/2020.    INFECTIONS:  None.    COMPLICATIONS:  None.    CONDITION ON DISCHARGE:  Stable.    ADMITTING INFORMATION:  Please see dictated note.    HOSPITAL COURSE:  After appropriate preoperative evaluation, obtaining   informed consent, the patient was taken to the operating room on 8/24/2020   where the above-listed procedure was performed under general anesthetic agent.    The patient tolerated the procedure well and was taken to recovery room and   then to the floor in stable condition.  She received 24 hours of perioperative   Ancef and was placed on her preoperative Eliquis as well as use of mechanical   prophylaxis for DVT prevention.  She was begun on ambulation and range of   motion on the afternoon of surgery; and by postop day #1, she is comfortable   on p.o. pain medication and independently ambulatory with her walker.  She has   a range of 0/2/80 in her left knee with good quad control.  She has a soft,   nontender calf and intact distal neurologic and vascular exam, clean and dry   wound and stable labs.  She will be discharged to home following afternoon   therapy on postop day #1.    DISPOSITION:  The patient will be discharged to home today and will follow up   with me in 2 weeks for a wound check and staple removal.  She has been   instructed to work aggressively on range of motion of the left knee, both   flexion and extension, and maybe weightbearing as tolerated on the left knee.    Arrangements have been made for outpatient physical therapy to begin this   week.  She has been given a walker for home use.  She may shower with her   Aquacel dressing in place.  This should be removed in 5-6 days and wound   should then be kept covered when showering.  The wound should be  kept clean   and dry and we should be notified immediately if there are problems or   increasing pain, drainage, evidence of infection, neurovascular compromise or   other significant concern.  She will continue her normal preoperative home   medications including her home Eliquis for her DVT prevention.       ____________________________________     MD VARGAS Hartman / SABINE    DD:  08/25/2020 08:28:38  DT:  08/26/2020 00:06:45    D#:  [id number]  Job#:  [id number]

## 2020-08-26 NOTE — PROGRESS NOTES
Received report from day shift. Assumed care of pt. Pt a&ox 4. VSS. Pt complains of 10/10 pain in left knee, medicated per MAR. Left knee dressing CDI. Polar ice applied. Nausea reported, provided zofran per MAR. POC discussed with pt. Pt aware of needing to be off IV pain medications by midnight to be able to go home tomorrow. No further needs at this time. Bed locked and in lowest position. Call light within reach. Will continue to monitor.

## 2020-08-26 NOTE — PROGRESS NOTES
Pt is alert and oriented x 4, L knee dressing with scan old sanguinous drainage, +CMS, +dorsiflexion, +plantar flexion, cap refill less than 3 seconds, +pulses, denies N/V, reports she had nausea last night that has resolved, tolerated small breakfast, unlabored breathing, denies SOB, SCD's in use, oriented to use of call light and importance of calling for assistance, bed alarm in use, bed in lowest position, call light within reach, updated on plan of care

## 2020-08-26 NOTE — PROGRESS NOTES
"Post op day # 2    Pt with increased pain, some nausea yesterday, poor mobility.  PT/nursing felt unsafe for discharge yesterday.    Blood pressure 130/90, pulse 71, temperature 37.1 °C (98.8 °F), temperature source Oral, resp. rate 18, height 1.702 m (5' 7\"), weight 89.2 kg (196 lb 10.4 oz), last menstrual period 06/02/2008, SpO2 91 %, not currently breastfeeding.    Neurovascular intact  Wound Clean and Dry  ROM 0/2/50, poor effort    Recent Labs     08/25/20  0418   WBC 7.0   RBC 3.05*   HEMOGLOBIN 8.5*   HEMATOCRIT 27.2*   MCV 89.2   MCH 27.5   MCHC 30.9*   RDW 50.9*   PLATELETCT 102*   MPV 10.8     Recent Labs     08/25/20  0418   SODIUM 138   POTASSIUM 4.2   CHLORIDE 106   CO2 24   GLUCOSE 105*   BUN 11   CREATININE 0.62   CALCIUM 7.9*       Plan: Continue with mobilization, WBAT on left.  Aggressive ROM left knee.  Should be out of bed majority of day today.  Had temp to 101.5 yesterday.  Encourage incentive spirometry.  Home today if safe with ambulation  "

## 2020-08-27 VITALS
TEMPERATURE: 100.4 F | SYSTOLIC BLOOD PRESSURE: 140 MMHG | OXYGEN SATURATION: 90 % | HEIGHT: 67 IN | BODY MASS INDEX: 30.87 KG/M2 | HEART RATE: 72 BPM | WEIGHT: 196.65 LBS | RESPIRATION RATE: 19 BRPM | DIASTOLIC BLOOD PRESSURE: 66 MMHG

## 2020-08-27 PROCEDURE — 97116 GAIT TRAINING THERAPY: CPT

## 2020-08-27 PROCEDURE — 97110 THERAPEUTIC EXERCISES: CPT

## 2020-08-27 PROCEDURE — 700105 HCHG RX REV CODE 258: Performed by: ORTHOPAEDIC SURGERY

## 2020-08-27 PROCEDURE — 97140 MANUAL THERAPY 1/> REGIONS: CPT

## 2020-08-27 PROCEDURE — 700102 HCHG RX REV CODE 250 W/ 637 OVERRIDE(OP): Performed by: ORTHOPAEDIC SURGERY

## 2020-08-27 PROCEDURE — A9270 NON-COVERED ITEM OR SERVICE: HCPCS | Performed by: ORTHOPAEDIC SURGERY

## 2020-08-27 PROCEDURE — 700101 HCHG RX REV CODE 250: Performed by: ORTHOPAEDIC SURGERY

## 2020-08-27 RX ADMIN — ACETAMINOPHEN 650 MG: 325 TABLET, FILM COATED ORAL at 04:54

## 2020-08-27 RX ADMIN — POLYETHYLENE GLYCOL 3350 1 PACKET: 17 POWDER, FOR SOLUTION ORAL at 04:53

## 2020-08-27 RX ADMIN — SODIUM CHLORIDE, POTASSIUM CHLORIDE, SODIUM LACTATE AND CALCIUM CHLORIDE: 600; 310; 30; 20 INJECTION, SOLUTION INTRAVENOUS at 04:02

## 2020-08-27 RX ADMIN — OXYCODONE HYDROCHLORIDE 10 MG: 10 TABLET ORAL at 07:56

## 2020-08-27 RX ADMIN — APIXABAN 5 MG: 5 TABLET, FILM COATED ORAL at 04:55

## 2020-08-27 RX ADMIN — POTASSIUM CHLORIDE 10 MEQ: 1500 TABLET, EXTENDED RELEASE ORAL at 04:54

## 2020-08-27 RX ADMIN — LOSARTAN POTASSIUM 100 MG: 25 TABLET, FILM COATED ORAL at 04:54

## 2020-08-27 RX ADMIN — DOCUSATE SODIUM 100 MG: 100 CAPSULE, LIQUID FILLED ORAL at 04:55

## 2020-08-27 RX ADMIN — MAGNESIUM GLUCONATE 500 MG ORAL TABLET 400 MG: 500 TABLET ORAL at 04:55

## 2020-08-27 RX ADMIN — DIAZEPAM 5 MG: 5 TABLET ORAL at 10:26

## 2020-08-27 RX ADMIN — OMEPRAZOLE 40 MG: 20 CAPSULE, DELAYED RELEASE ORAL at 04:54

## 2020-08-27 RX ADMIN — GABAPENTIN 400 MG: 100 CAPSULE ORAL at 04:53

## 2020-08-27 RX ADMIN — OXYCODONE HYDROCHLORIDE 10 MG: 10 TABLET ORAL at 03:14

## 2020-08-27 ASSESSMENT — GAIT ASSESSMENTS
GAIT LEVEL OF ASSIST: SUPERVISED
DEVIATION: ANTALGIC;STEP TO
DISTANCE (FEET): 200
ASSISTIVE DEVICE: FRONT WHEEL WALKER

## 2020-08-27 NOTE — THERAPY
Physical Therapy   Daily Treatment     Patient Name: April Alicia  Age:  60 y.o., Sex:  female  Medical Record #: [id number]  Today's Date: 8/27/2020     Precautions: Weight Bearing As Tolerated Left Lower Extremity    Assessment    Pt doing much better today appears safe and ready for home    Plan    Discharge secondary to goals met.      08/27/20 0900   Balance   Sitting Balance (Static) Good   Sitting Balance (Dynamic) Good   Standing Balance (Static) Fair +   Standing Balance (Dynamic) Fair +   Weight Shift Sitting Good   Weight Shift Standing Good   Gait Analysis   Gait Level Of Assist Supervised   Assistive Device Front Wheel Walker   Distance (Feet) 200   # of Times Distance was Traveled 1   Deviation Antalgic;Step To   # of Stairs Climbed 3   Level of Assist with Stairs Minimal Assist   Weight Bearing Status wbat L   Bed Mobility    Supine to Sit Modified Independent   Sit to Supine Modified Independent   Scooting Modified Independent   Functional Mobility   Sit to Stand Supervised   Bed, Chair, Wheelchair Transfer Supervised   Transfer Method Stand Step   Activity Tolerance   Sitting Edge of Bed 10   Standing 10   Short Term Goals    Goal Outcome # 1 Goal met   Goal Outcome # 2 Progressing as expected   Goal Outcome # 3 Goal met   Goal Outcome # 4 Progressing as expected   Anticipated Discharge Equipment and Recommendations   Discharge Recommendations Recommend outpatient physical therapy services to address higher level deficits       DC Equipment Recommendations: Front-Wheel Walker  Discharge Recommendations: (P) Recommend outpatient physical therapy services to address higher level deficits

## 2020-08-27 NOTE — PROGRESS NOTES
"Total Joint Replacement Program Rounding     Inpatient bedside rounding completed to address quality of care provided by total joint replacement program IDT and overall patient experience at [facility].    Patient Satisfaction addressed. Patient/family updated on POC during hospital stay.  Thorough safety education completed including use of call light prior to all mobility throughout the entirety of the hospital stay. Also educated on ankle pumps and incentive inspirometry use to prevent post-op complications. SCDs in place.     No further questions/concerns at this time. Please see \"MyRounding\" for further details of rounding discussion. RN updated.         "

## 2020-08-27 NOTE — PROGRESS NOTES
Received report from noc shift nurse. Assumed pt care at 0715. Pt is A&Ox4, resting comfortably in bed. Pt on room air. No signs of SOB/respiratory distress. Pt c/o L knee pain 9/10 at this moment. Given Oxycodone-10mg pain med per MAR. Assessment completed, Labs noted, VSS. Surgical dressing to L knee in place scant old drainage observed, dry, intact, +CMS, + pulse, able to wiggle toes and dorsi/plantar flex. Polar ice & enoch hose & SCDs on.  Educated pt the importance to call for assistance. Fall precautions in place. Bed at lowest position. Bed locked. Call light and personal belongings within reach. Continue to monitor.

## 2020-08-27 NOTE — PROGRESS NOTES
Discharging Patient home per physician order.  Discharged with self via transport.  Demonstrated understanding of discharge instructions, follow up appointments, home medications, prescriptions, home care for surgical wound.  Ambulating with FWW and standby assistance, voiding without difficulty, pain well controlled, pt states she has an appointment at the [facility] for pain medication, tolerating oral medications, oxygen saturation greater than 90% , tolerating diet.   Educational handouts for total knee replacement given and discussed.  Verbalized understanding of discharge instructions and educational handouts.  All questions answered.  Belongings with patient at time of discharge.

## 2020-08-27 NOTE — PROGRESS NOTES
Received report from day shift RN. Assumed care of patient. Patient is currently AOx4 but had some confusion over the medications that were just given to her. Reporting pain with her L knee, see MAR. Reporting no SOB or chest pain. No N/V. Dressing to L knee is CDI. CMS intact. Patient has voided and walked. POC reviewed with the patient and plan for discharge in the AM. VSS. Hourly rounding in place. Bed locked and in lowest position, call light and belongings within reach.

## 2020-08-27 NOTE — CARE PLAN
Problem: Safety  Goal: Will remain free from injury  Outcome: PROGRESSING AS EXPECTED  Note: Safety precautions in place. Patient educated to call when needing to get up and use the restroom and when needing assistance, patient verbalized understanding.     Problem: Venous Thromboembolism (VTW)/Deep Vein Thrombosis (DVT) Prevention:  Goal: Patient will participate in Venous Thrombosis (VTE)/Deep Vein Thrombosis (DVT)Prevention Measures  Outcome: PROGRESSING AS EXPECTED  Note: SCDs on and eliquis being utilized.

## 2020-08-27 NOTE — PROGRESS NOTES
Patient unable to tolerate stairs with PT today.  Dr. Faustin notified, patient will stay over night.

## 2020-08-28 ENCOUNTER — APPOINTMENT (OUTPATIENT)
Dept: RADIOLOGY | Facility: MEDICAL CENTER | Age: 60
End: 2020-08-28
Attending: EMERGENCY MEDICINE
Payer: MEDICAID

## 2020-08-28 ENCOUNTER — HOSPITAL ENCOUNTER (EMERGENCY)
Facility: MEDICAL CENTER | Age: 60
End: 2020-08-28
Attending: EMERGENCY MEDICINE | Admitting: EMERGENCY MEDICINE
Payer: MEDICAID

## 2020-08-28 VITALS
OXYGEN SATURATION: 99 % | BODY MASS INDEX: 30.8 KG/M2 | DIASTOLIC BLOOD PRESSURE: 60 MMHG | RESPIRATION RATE: 19 BRPM | SYSTOLIC BLOOD PRESSURE: 112 MMHG | WEIGHT: 196.65 LBS | TEMPERATURE: 97.3 F | HEART RATE: 60 BPM

## 2020-08-28 DIAGNOSIS — G89.18 POST-OPERATIVE PAIN: ICD-10-CM

## 2020-08-28 DIAGNOSIS — Z96.652 TOTAL KNEE REPLACEMENT STATUS, LEFT: ICD-10-CM

## 2020-08-28 LAB
ALBUMIN SERPL BCP-MCNC: 3.2 G/DL (ref 3.2–4.9)
ALBUMIN/GLOB SERPL: 1 G/DL
ALP SERPL-CCNC: 102 U/L (ref 30–99)
ALT SERPL-CCNC: 13 U/L (ref 2–50)
ANION GAP SERPL CALC-SCNC: 10 MMOL/L (ref 7–16)
AST SERPL-CCNC: 13 U/L (ref 12–45)
BASOPHILS # BLD AUTO: 0.9 % (ref 0–1.8)
BASOPHILS # BLD: 0.05 K/UL (ref 0–0.12)
BILIRUB SERPL-MCNC: 1.3 MG/DL (ref 0.1–1.5)
BUN SERPL-MCNC: 16 MG/DL (ref 8–22)
CALCIUM SERPL-MCNC: 8.5 MG/DL (ref 8.5–10.5)
CHLORIDE SERPL-SCNC: 97 MMOL/L (ref 96–112)
CO2 SERPL-SCNC: 24 MMOL/L (ref 20–33)
CREAT SERPL-MCNC: 0.93 MG/DL (ref 0.5–1.4)
EOSINOPHIL # BLD AUTO: 0.3 K/UL (ref 0–0.51)
EOSINOPHIL NFR BLD: 5.6 % (ref 0–6.9)
ERYTHROCYTE [DISTWIDTH] IN BLOOD BY AUTOMATED COUNT: 47.8 FL (ref 35.9–50)
GLOBULIN SER CALC-MCNC: 3.2 G/DL (ref 1.9–3.5)
GLUCOSE SERPL-MCNC: 101 MG/DL (ref 65–99)
HCT VFR BLD AUTO: 24.2 % (ref 37–47)
HGB BLD-MCNC: 7.7 G/DL (ref 12–16)
IMM GRANULOCYTES # BLD AUTO: 0.03 K/UL (ref 0–0.11)
IMM GRANULOCYTES NFR BLD AUTO: 0.6 % (ref 0–0.9)
LYMPHOCYTES # BLD AUTO: 1.11 K/UL (ref 1–4.8)
LYMPHOCYTES NFR BLD: 20.7 % (ref 22–41)
MCH RBC QN AUTO: 27.9 PG (ref 27–33)
MCHC RBC AUTO-ENTMCNC: 31.8 G/DL (ref 33.6–35)
MCV RBC AUTO: 87.7 FL (ref 81.4–97.8)
MONOCYTES # BLD AUTO: 0.6 K/UL (ref 0–0.85)
MONOCYTES NFR BLD AUTO: 11.2 % (ref 0–13.4)
NEUTROPHILS # BLD AUTO: 3.27 K/UL (ref 2–7.15)
NEUTROPHILS NFR BLD: 61 % (ref 44–72)
NRBC # BLD AUTO: 0 K/UL
NRBC BLD-RTO: 0 /100 WBC
PLATELET # BLD AUTO: 164 K/UL (ref 164–446)
PMV BLD AUTO: 10.2 FL (ref 9–12.9)
POTASSIUM SERPL-SCNC: 3.9 MMOL/L (ref 3.6–5.5)
PROT SERPL-MCNC: 6.4 G/DL (ref 6–8.2)
RBC # BLD AUTO: 2.76 M/UL (ref 4.2–5.4)
SODIUM SERPL-SCNC: 131 MMOL/L (ref 135–145)
WBC # BLD AUTO: 5.4 K/UL (ref 4.8–10.8)

## 2020-08-28 PROCEDURE — 85025 COMPLETE CBC W/AUTO DIFF WBC: CPT

## 2020-08-28 PROCEDURE — 99284 EMERGENCY DEPT VISIT MOD MDM: CPT

## 2020-08-28 PROCEDURE — 93971 EXTREMITY STUDY: CPT | Mod: LT

## 2020-08-28 PROCEDURE — 96375 TX/PRO/DX INJ NEW DRUG ADDON: CPT

## 2020-08-28 PROCEDURE — 80053 COMPREHEN METABOLIC PANEL: CPT

## 2020-08-28 PROCEDURE — 96374 THER/PROPH/DIAG INJ IV PUSH: CPT

## 2020-08-28 PROCEDURE — 700111 HCHG RX REV CODE 636 W/ 250 OVERRIDE (IP): Performed by: EMERGENCY MEDICINE

## 2020-08-28 RX ORDER — HYDROMORPHONE HYDROCHLORIDE 1 MG/ML
0.5 INJECTION, SOLUTION INTRAMUSCULAR; INTRAVENOUS; SUBCUTANEOUS ONCE
Status: COMPLETED | OUTPATIENT
Start: 2020-08-28 | End: 2020-08-28

## 2020-08-28 RX ORDER — ONDANSETRON 2 MG/ML
4 INJECTION INTRAMUSCULAR; INTRAVENOUS ONCE
Status: COMPLETED | OUTPATIENT
Start: 2020-08-28 | End: 2020-08-28

## 2020-08-28 RX ORDER — OXYCODONE HYDROCHLORIDE AND ACETAMINOPHEN 5; 325 MG/1; MG/1
1 TABLET ORAL EVERY 4 HOURS PRN
Qty: 15 TAB | Refills: 0 | Status: SHIPPED | OUTPATIENT
Start: 2020-08-28 | End: 2020-09-01

## 2020-08-28 RX ADMIN — ONDANSETRON 4 MG: 2 INJECTION INTRAMUSCULAR; INTRAVENOUS at 19:58

## 2020-08-28 RX ADMIN — HYDROMORPHONE HYDROCHLORIDE 0.5 MG: 1 INJECTION, SOLUTION INTRAMUSCULAR; INTRAVENOUS; SUBCUTANEOUS at 19:58

## 2020-08-28 ASSESSMENT — FIBROSIS 4 INDEX: FIB4 SCORE: 3.23

## 2020-08-29 PROCEDURE — 93971 EXTREMITY STUDY: CPT | Mod: 26,LT | Performed by: INTERNAL MEDICINE

## 2020-08-29 NOTE — ED NOTES
Pt assisted to wheelchair by walker without new complaint or distress. Pt placed from wheelchair to bed, rails up, call light within reach.

## 2020-08-29 NOTE — ED TRIAGE NOTES
"Pt to triage with walker, states she was dc'd from [facility] yesterday after having knee surgery. States they \" didn't give her any prescriptions for pain, and she is having pain\" . Also c/o having a hard time sleeping   "

## 2020-08-29 NOTE — ED PROVIDER NOTES
ED Provider Note    CHIEF COMPLAINT  Chief Complaint   Patient presents with   • Post-Op Pain       HPI  April Alicia is a 60 y.o. female who presents for evaluation of circumfrential left knee calf pain and leg swelling.  The patient is around 4 days postop from a left total knee replacement.  She has a history of blood clots in the right leg.  The surgery was performed by Dr. Faustin.  The patient was apparently placed on apixaban postoperatively but did not start until last night.  She has been taking her postoperative pain medication with marginal relief.  She denies high fevers or chills no chest pain or shortness of breath.  She denies any fevers or chills.  She does report some serosanguineous drainage from the anterior aspect of the left knee    REVIEW OF SYSTEMS  See HPI for further details.  No high fevers chills night sweats weight loss all other systems are negative.     PAST MEDICAL HISTORY  Past Medical History:   Diagnosis Date   • Pneumonia 02/2020   • Blood clotting disorder (HCC) 2018    right leg blood clot   • Infection 2018    Right knee after total knee   • Infection 9/4/2017   • Arrhythmia    • Arthritis     OA in knees   • ASTHMA    • Dental disorder     upper and lower dentures   • Diabetes    • Emphysema of lung (HCC)    • Heart abnormality     leakage in left valve   • Heart burn     left knee   • Heart valve disease    • Hypertension    • Liver disease    • Seizure disorder (HCC)        FAMILY HISTORY  Noncontributory    SOCIAL HISTORY  Social History     Socioeconomic History   • Marital status: Single     Spouse name: Not on file   • Number of children: Not on file   • Years of education: Not on file   • Highest education level: Not on file   Occupational History   • Not on file   Social Needs   • Financial resource strain: Not hard at all   • Food insecurity     Worry: Never true     Inability: Never true   • Transportation needs     Medical: No     Non-medical: No    Tobacco Use   • Smoking status: Former Smoker     Quit date: 12/29/2016     Years since quitting: 3.6   • Smokeless tobacco: Never Used   • Tobacco comment: 10/2010; smoked 2 cigarettes per day for 1 year and quit last week   Substance and Sexual Activity   • Alcohol use: No   • Drug use: No   • Sexual activity: Not on file   Lifestyle   • Physical activity     Days per week: Not on file     Minutes per session: Not on file   • Stress: Not on file   Relationships   • Social connections     Talks on phone: Not on file     Gets together: Not on file     Attends Gnosticist service: Not on file     Active member of club or organization: Not on file     Attends meetings of clubs or organizations: Not on file     Relationship status: Not on file   • Intimate partner violence     Fear of current or ex partner: Not on file     Emotionally abused: Not on file     Physically abused: Not on file     Forced sexual activity: Not on file   Other Topics Concern   • Not on file   Social History Narrative   • Not on file     Denies IV drugs  SURGICAL HISTORY  Past Surgical History:   Procedure Laterality Date   • PB TOTAL KNEE ARTHROPLASTY Left 8/24/2020    Procedure: ARTHROPLASTY, KNEE, TOTAL;  Surgeon: Víctor Faustin M.D.;  Location: SURGERY [facility];  Service: Orthopedics   • KNEE ARTHROPLASTY TOTAL Right 2018   • IRRIGATION & DEBRIDEMENT ORTHO Right 9/3/2017    Procedure: IRRIGATION & DEBRIDEMENT ORTHO, POLY EXCHANGE;  Surgeon: Jerome Russell M.D.;  Location: SURGERY [facility];  Service: Orthopedics   • COLONOSCOPY WITH CLIPPING  10/28/2015    Procedure: COLONOSCOPY WITH CLIPPING;  Surgeon: Ruben Colon M.D.;  Location: ENDOSCOPY [facility];  Service:    • COLONOSCOPY WITH SCLEROTHERAPY  10/28/2015    Procedure: COLONOSCOPY WITH SCLEROTHERAPY;  Surgeon: Ruben Colon M.D.;  Location: ENDOSCOPY [facility];  Service:    • COLONOSCOPY WITH TATTOOING  10/28/2015    Procedure: COLONOSCOPY WITH  TATTOOING;  Surgeon: Ruben Colon M.D.;  Location: [facility];  Service:    • GYN SURGERY  2003    hysteroscoopy   • GYN SURGERY  1982    tubal ligation       CURRENT MEDICATIONS  Home Medications    **Home medications have not yet been reviewed for this encounter**         Current Facility-Administered Medications:   •  ondansetron (ZOFRAN) syringe/vial injection 4 mg, 4 mg, Intravenous, Once, Tyrell Jones M.D.  •  HYDROmorphone pf (DILAUDID) injection 0.5 mg, 0.5 mg, Intravenous, Once, Tyrell Jones M.D.    Current Outpatient Medications:   •  sulfamethoxazole-trimethoprim (BACTRIM DS) 800-160 MG tablet, , Disp: , Rfl:   •  Prenatal Vit-Fe Fumarate-FA (PRENATAL VITAMIN WITH FE/FA) 27-0.8 MG Tab, , Disp: , Rfl:   •  pantoprazole (PROTONIX) 40 MG Tablet Delayed Response, , Disp: , Rfl:   •  ondansetron (ZOFRAN) 4 MG Tab tablet, , Disp: , Rfl:   •  Omega-3 Fatty Acids (ULTRA OMEGA 3) 1000 MG Cap, , Disp: , Rfl:   •  lidocaine-prilocaine (EMLA) 2.5-2.5 % Cream, , Disp: , Rfl:   •  lidocaine (LIDODERM) 5 % Patch, , Disp: , Rfl:   •  Diclofenac Sodium 1 % Gel, , Disp: , Rfl:   •  oyster shell calcium/D (OS-DAILY) 500-200 MG-UNIT Tab, Take 1 Tab by mouth 2 times a day, with meals., Disp: , Rfl:   •  MULTAQ 400 MG Tab, Take 400 mg by mouth 2 times a day, with meals., Disp: , Rfl:   •  felodipine (PLENDIL) 5 MG TABLET SR 24 HR, Take 5 mg by mouth every evening., Disp: , Rfl:   •  gabapentin (NEURONTIN) 400 MG Cap, Take 400 mg by mouth 2 times a day., Disp: , Rfl:   •  hydrocortisone 1 % Ointment, Apply 1 Application to affected area(s) 2 times a day as needed., Disp: , Rfl:   •  metFORMIN ER (GLUCOPHAGE XR) 500 MG TABLET SR 24 HR, Take 500 mg by mouth every evening., Disp: , Rfl:   •  potassium chloride ER (KLOR-CON) 10 MEQ tablet, Take 10 mEq by mouth every day., Disp: , Rfl:   •  Skin Protectants, Misc. (MINERIN) Cream, Apply 1 Application to affected area(s) 2 times a day as needed. FEET, Disp: ,  Rfl:   •  tiotropium (SPIRIVA HANDIHALER) 18 MCG Cap, Inhale 18 mcg by mouth every day., Disp: , Rfl:   •  ascorbic acid (ASCORBIC ACID) 500 MG Tab, Take 500 mg by mouth every day., Disp: , Rfl:   •  apixaban (ELIQUIS) 5mg Tab, Take 5 mg by mouth 2 Times a Day., Disp: , Rfl:   •  oxyCODONE immediate-release (ROXICODONE) 5 MG Tab, Take 5 mg by mouth every four hours as needed for Severe Pain., Disp: , Rfl:   •  magnesium oxide (MAG-OX) 400 MG Tab, Take 400 mg by mouth every day., Disp: , Rfl:   •  docusate sodium (COLACE) 100 MG Cap, Take 100 mg by mouth 2 times a day., Disp: , Rfl:   •  losartan (COZAAR) 100 MG Tab, Take 100 mg by mouth every day., Disp: , Rfl:   •  albuterol (VENTOLIN OR PROVENTIL) 108 (90 BASE) MCG/ACT Aero Soln inhalation aerosol, Inhale 2 Puffs by mouth every 6 hours as needed for Shortness of Breath., Disp: , Rfl:     ALLERGIES  Allergies   Allergen Reactions   • Nkda [No Known Drug Allergy]        PHYSICAL EXAM  VITAL SIGNS: BP (!) 97/53   Pulse 73   Temp 36.2 °C (97.2 °F) (Temporal)   Resp 16   Wt 89.2 kg (196 lb 10.4 oz)   LMP 06/02/2008   SpO2 92%   BMI 30.80 kg/m²       Constitutional: Well developed, Well nourished, No acute distress, Non-toxic appearance.   HENT: Normocephalic, Atraumatic, Bilateral external ears normal, Oropharynx moist, No oral exudates, Nose normal.   Eyes: PERRLA, EOMI, Conjunctiva normal, No discharge.   Neck: Normal range of motion, No tenderness, Supple, No stridor.   Cardiovascular: Normal heart rate, Normal rhythm, No murmurs, No rubs, No gallops.   Thorax & Lungs: Normal breath sounds, No respiratory distress, No wheezing, No chest tenderness.   Abdomen: Bowel sounds normal, Soft, No tenderness, No masses, No pulsatile masses.   Skin: Warm, Dry, No erythema, No rash.   Back: No tenderness, No CVA tenderness.   Extremities: Lower extremity exam is notable for ecchymosis bruising soft tissue swelling without redness warmth or erythema.  The surgical  incision on the anterior knee has some serosanguineous drainage without dehiscence.  Dorsalis pedal pulses intact   neurologic: Alert & oriented x 3, Normal motor function, Normal sensory function, No focal deficits noted.   Psychiatric: Affect normal, Judgment normal, Mood normal.     US-EXTREMITY VENOUS LOWER UNILAT LEFT           Results for orders placed or performed during the hospital encounter of 08/28/20   CBC WITH DIFFERENTIAL   Result Value Ref Range    WBC 5.4 4.8 - 10.8 K/uL    RBC 2.76 (L) 4.20 - 5.40 M/uL    Hemoglobin 7.7 (L) 12.0 - 16.0 g/dL    Hematocrit 24.2 (L) 37.0 - 47.0 %    MCV 87.7 81.4 - 97.8 fL    MCH 27.9 27.0 - 33.0 pg    MCHC 31.8 (L) 33.6 - 35.0 g/dL    RDW 47.8 35.9 - 50.0 fL    Platelet Count 164 164 - 446 K/uL    MPV 10.2 9.0 - 12.9 fL    Neutrophils-Polys 61.00 44.00 - 72.00 %    Lymphocytes 20.70 (L) 22.00 - 41.00 %    Monocytes 11.20 0.00 - 13.40 %    Eosinophils 5.60 0.00 - 6.90 %    Basophils 0.90 0.00 - 1.80 %    Immature Granulocytes 0.60 0.00 - 0.90 %    Nucleated RBC 0.00 /100 WBC    Neutrophils (Absolute) 3.27 2.00 - 7.15 K/uL    Lymphs (Absolute) 1.11 1.00 - 4.80 K/uL    Monos (Absolute) 0.60 0.00 - 0.85 K/uL    Eos (Absolute) 0.30 0.00 - 0.51 K/uL    Baso (Absolute) 0.05 0.00 - 0.12 K/uL    Immature Granulocytes (abs) 0.03 0.00 - 0.11 K/uL    NRBC (Absolute) 0.00 K/uL   Comp Metabolic Panel   Result Value Ref Range    Sodium 131 (L) 135 - 145 mmol/L    Potassium 3.9 3.6 - 5.5 mmol/L    Chloride 97 96 - 112 mmol/L    Co2 24 20 - 33 mmol/L    Anion Gap 10.0 7.0 - 16.0    Glucose 101 (H) 65 - 99 mg/dL    Bun 16 8 - 22 mg/dL    Creatinine 0.93 0.50 - 1.40 mg/dL    Calcium 8.5 8.5 - 10.5 mg/dL    AST(SGOT) 13 12 - 45 U/L    ALT(SGPT) 13 2 - 50 U/L    Alkaline Phosphatase 102 (H) 30 - 99 U/L    Total Bilirubin 1.3 0.1 - 1.5 mg/dL    Albumin 3.2 3.2 - 4.9 g/dL    Total Protein 6.4 6.0 - 8.2 g/dL    Globulin 3.2 1.9 - 3.5 g/dL    A-G Ratio 1.0 g/dL   ESTIMATED GFR   Result Value  Ref Range    GFR If African American >60 >60 mL/min/1.73 m 2    GFR If Non African American >60 >60 mL/min/1.73 m 2      Vascular Laboratory   CONCLUSIONS         FRANCISCA TURNER      Exam Date:     2020 20:41      Room #:     Inpatient      Priority:     Call Back      Ht (in):             Wt (lb):      Ordering Physician:        YOKASTA NORRIS      Referring Physician:       [id number] Moore      Sonographer:               Marlon Yoo RVT      Study Type:                Complete Unilateral      Technical Quality:         Adequate      Age:    60    Gender:     F      MRN:    [id number]      :    1960      BSA:      Indications:     Localized swelling, mass and lump, left lower limb, Edema      CPT Codes:       16780      ICD Codes:       R22.42  7823      History:         left total knee replacement 2020      Limitations:      PROCEDURES:   Venous duplex imaging was performed in only the left lower extremity    including serial compressions and spectral Doppler flow evaluation.      FINDINGS:   The veins of the left lower extremity are readily compressible with normal    venous flow dynamics including spontaneous flow and respiratory phasic    variation.  No evidence of deep venous thrombosis.           COURSE & MEDICAL DECISION MAKING  Pertinent Labs & Imaging studies reviewed. (See chart for details)  An IV is established.  I reviewed the patient's record.  Her operative course appears to be uncomplicated.  She does have prior history of postoperative blood clots and did not start her apixaban until around 36 hours ago.  Her laboratory studies are notable for anemia which she had postoperatively and it has come down small amount but is still above 7.  I her hemoglobin is dropped around one-point.  This is likely postoperative bleeding.  She has no leukocytosis.  Metabolic panel is unremarkable.  Ultrasound is negative for DVT.  She was given parenteral pain medication and feels improved.  The  dressing was removed and there is no dehiscence no erythema no pus.  There is some clear serosanguineous drainage.  I reapplied a dressing.  The patient apparently is out of pain medication which fits with her prescription and I will give her a small refill and have her follow-up with her orthopedist in 48 hours      FINAL IMPRESSION  1.  Left knee postoperative pain status post total knee arthroplasty  2.  Wound check for #1  3.  Postoperative edema related to #1         Electronically signed by: Tyrell Jones M.D., 8/28/2020 7:37 PM

## 2020-08-31 ENCOUNTER — APPOINTMENT (OUTPATIENT)
Dept: PHYSICAL THERAPY | Facility: REHABILITATION | Age: 60
End: 2020-08-31
Attending: NURSE PRACTITIONER
Payer: MEDICAID

## 2020-09-02 ENCOUNTER — PHYSICAL THERAPY (OUTPATIENT)
Dept: PHYSICAL THERAPY | Facility: REHABILITATION | Age: 60
End: 2020-09-02
Attending: NURSE PRACTITIONER
Payer: MEDICAID

## 2020-09-02 DIAGNOSIS — M17.9 OSTEOARTHRITIS OF KNEE, UNSPECIFIED: ICD-10-CM

## 2020-09-02 PROCEDURE — 97014 ELECTRIC STIMULATION THERAPY: CPT

## 2020-09-02 PROCEDURE — 97110 THERAPEUTIC EXERCISES: CPT

## 2020-09-02 PROCEDURE — 97116 GAIT TRAINING THERAPY: CPT

## 2020-09-02 NOTE — OP THERAPY DAILY TREATMENT
"  Outpatient Physical Therapy  DAILY TREATMENT     [facility] [address]  [address]  [place] [address]  Phone:  [phone]  Fax:  [phone]    Date: 09/02/2020    Patient: April Alicia  YOB: 1960  MRN: [id number]     Time Calculation    Start time: 1030  Stop time: 1135 Time Calculation (min): 65 minutes         Chief Complaint: Knee Problem    Visit #: 2    SUBJECTIVE:  Patient is s/p :L TKA on 8/24/2020. D/c home ambulating WBAT with walker. Visited ED on 8/28/2020 due to pain. US showed no DVT, patient given pain med refill. Presents to clinic using bilateral crutches.    OBJECTIVE:  Current objective measures:   L Knee AROM 10-65 deg  L knee PROM 5-72 deg  Gait: Flat foot pattern, equal stance time, proper use bilateral crutches - cueing for glute engagement, quad engagement in stance  Transfers: Leno with bilateral crutches  Pain: start session 10/10 end session 6/10    Dressing intact L knee. Bruising medial R thigh. Normal post-op swelling. No drainage through bandage.          Therapeutic Exercises (CPT 14472):     1. Nustepper seat at 8 , 8 min L0, Repeated at end of appointment for 10 additional minutes, no charge    2. AROM R knee flexion in sitting, seated with foot on skateboard AAROM x 5 min then seated with foot on pillowcase on floor x 3 min, AROM added HEP - perform hourly    3. R knee flexion stretch in sitting, moving hips forward once foot places, holding 1 min at a time, added HEP - perform hourly    4. R knee extension stretch foot on chair, 2 min, added HEP - perform hourly    5. Quad sets to pillow with heel on javier, 2 x 10 with 5 second hold , added HEP - perform 3-4 x per day    Therapeutic Treatments and Modalities:     1. E Stim Unattended (CPT 25099), Russian R quad with quad set to pillow with heel on pillow 10/10 10 min, \"feels good\"    2. Gait Training (CPT 28368), Step through pattern bilateral crutches, cuieng for " heel/toe, step through, glute/quad engagement 100' x 2    Time-based treatments/modalities:    Physical Therapy Timed Treatment Charges  Gait training minutes (CPT 29710): 10 minutes  Therapeutic exercise minutes (CPT 31404): 18 minutes          ASSESSMENT:   Response to treatment: Very limited ROM and lacking TKE. High pain levels. Improved ROM and pain with tx. HEP updated and handout provided. Patient education re: good pain vs bad pain and encouraged to perform HEP very frequently, as indicated on handout, in order to work on ROM. Emphasized importance of gaining TKE, and to avoid resting knee on pillow/flexed for extended periods. Patient expressed understanding.    PLAN/RECOMMENDATIONS:   Plan for treatment: therapy treatment to continue next visit.  Planned interventions for next visit: continue with current treatment.

## 2020-09-08 ENCOUNTER — PHYSICAL THERAPY (OUTPATIENT)
Dept: PHYSICAL THERAPY | Facility: REHABILITATION | Age: 60
End: 2020-09-08
Attending: NURSE PRACTITIONER
Payer: MEDICAID

## 2020-09-08 DIAGNOSIS — M17.9 OSTEOARTHRITIS OF KNEE, UNSPECIFIED: ICD-10-CM

## 2020-09-08 PROCEDURE — 97112 NEUROMUSCULAR REEDUCATION: CPT

## 2020-09-08 PROCEDURE — 97110 THERAPEUTIC EXERCISES: CPT
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
## 2020-09-09 ENCOUNTER — APPOINTMENT (OUTPATIENT)
Dept: RADIOLOGY | Facility: MEDICAL CENTER | Age: 60
End: 2020-09-09
Attending: EMERGENCY MEDICINE
Payer: MEDICAID

## 2020-09-09 ENCOUNTER — HOSPITAL ENCOUNTER (EMERGENCY)
Facility: MEDICAL CENTER | Age: 60
End: 2020-09-09
Attending: EMERGENCY MEDICINE
Payer: MEDICAID

## 2020-09-09 VITALS
HEART RATE: 63 BPM | SYSTOLIC BLOOD PRESSURE: 119 MMHG | DIASTOLIC BLOOD PRESSURE: 55 MMHG | TEMPERATURE: 98.9 F | RESPIRATION RATE: 16 BRPM | BODY MASS INDEX: 30.45 KG/M2 | WEIGHT: 194 LBS | HEIGHT: 67 IN | OXYGEN SATURATION: 94 %

## 2020-09-09 DIAGNOSIS — G89.18 POST-OP PAIN: ICD-10-CM

## 2020-09-09 DIAGNOSIS — Z96.652 STATUS POST LEFT KNEE REPLACEMENT: ICD-10-CM

## 2020-09-09 DIAGNOSIS — D72.819 LEUKOPENIA, UNSPECIFIED TYPE: ICD-10-CM

## 2020-09-09 DIAGNOSIS — D64.9 NORMOCYTIC ANEMIA: ICD-10-CM

## 2020-09-09 LAB
ANION GAP SERPL CALC-SCNC: 11 MMOL/L (ref 7–16)
BASOPHILS # BLD AUTO: 1.4 % (ref 0–1.8)
BASOPHILS # BLD: 0.04 K/UL (ref 0–0.12)
BUN SERPL-MCNC: 12 MG/DL (ref 8–22)
CALCIUM SERPL-MCNC: 8.3 MG/DL (ref 8.5–10.5)
CHLORIDE SERPL-SCNC: 106 MMOL/L (ref 96–112)
CO2 SERPL-SCNC: 21 MMOL/L (ref 20–33)
CREAT SERPL-MCNC: 0.64 MG/DL (ref 0.5–1.4)
EOSINOPHIL # BLD AUTO: 0.18 K/UL (ref 0–0.51)
EOSINOPHIL NFR BLD: 6.2 % (ref 0–6.9)
ERYTHROCYTE [DISTWIDTH] IN BLOOD BY AUTOMATED COUNT: 48.8 FL (ref 35.9–50)
GLUCOSE SERPL-MCNC: 114 MG/DL (ref 65–99)
HCT VFR BLD AUTO: 23.5 % (ref 37–47)
HGB BLD-MCNC: 7.1 G/DL (ref 12–16)
IMM GRANULOCYTES # BLD AUTO: 0.01 K/UL (ref 0–0.11)
IMM GRANULOCYTES NFR BLD AUTO: 0.3 % (ref 0–0.9)
LYMPHOCYTES # BLD AUTO: 0.65 K/UL (ref 1–4.8)
LYMPHOCYTES NFR BLD: 22.3 % (ref 22–41)
MCH RBC QN AUTO: 26 PG (ref 27–33)
MCHC RBC AUTO-ENTMCNC: 30.2 G/DL (ref 33.6–35)
MCV RBC AUTO: 86.1 FL (ref 81.4–97.8)
MONOCYTES # BLD AUTO: 0.32 K/UL (ref 0–0.85)
MONOCYTES NFR BLD AUTO: 11 % (ref 0–13.4)
NEUTROPHILS # BLD AUTO: 1.72 K/UL (ref 2–7.15)
NEUTROPHILS NFR BLD: 58.8 % (ref 44–72)
NRBC # BLD AUTO: 0 K/UL
NRBC BLD-RTO: 0 /100 WBC
PLATELET # BLD AUTO: 192 K/UL (ref 164–446)
PMV BLD AUTO: 9.7 FL (ref 9–12.9)
POTASSIUM SERPL-SCNC: 4.5 MMOL/L (ref 3.6–5.5)
RBC # BLD AUTO: 2.73 M/UL (ref 4.2–5.4)
SODIUM SERPL-SCNC: 138 MMOL/L (ref 135–145)
WBC # BLD AUTO: 2.9 K/UL (ref 4.8–10.8)

## 2020-09-09 PROCEDURE — 80048 BASIC METABOLIC PNL TOTAL CA: CPT

## 2020-09-09 PROCEDURE — 85025 COMPLETE CBC W/AUTO DIFF WBC: CPT

## 2020-09-09 PROCEDURE — 700102 HCHG RX REV CODE 250 W/ 637 OVERRIDE(OP): Performed by: EMERGENCY MEDICINE

## 2020-09-09 PROCEDURE — 93971 EXTREMITY STUDY: CPT | Mod: LT

## 2020-09-09 PROCEDURE — 71045 X-RAY EXAM CHEST 1 VIEW: CPT

## 2020-09-09 PROCEDURE — 87040 BLOOD CULTURE FOR BACTERIA: CPT

## 2020-09-09 PROCEDURE — 99284 EMERGENCY DEPT VISIT MOD MDM: CPT

## 2020-09-09 PROCEDURE — A9270 NON-COVERED ITEM OR SERVICE: HCPCS | Performed by: EMERGENCY MEDICINE

## 2020-09-09 PROCEDURE — 36415 COLL VENOUS BLD VENIPUNCTURE: CPT

## 2020-09-09 RX ORDER — OXYCODONE HYDROCHLORIDE 5 MG/1
5 TABLET ORAL ONCE
Status: COMPLETED | OUTPATIENT
Start: 2020-09-09 | End: 2020-09-09

## 2020-09-09 RX ADMIN — OXYCODONE HYDROCHLORIDE 5 MG: 5 TABLET ORAL at 11:55

## 2020-09-09 ASSESSMENT — FIBROSIS 4 INDEX: FIB4 SCORE: 1.32

## 2020-09-09 ASSESSMENT — LIFESTYLE VARIABLES: DO YOU DRINK ALCOHOL: NO
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
## 2020-09-09 NOTE — ED NOTES
Pt given discharge instruction and will follow up as instructed. Patient taken out via wheelchair to friends vehicle.

## 2020-09-09 NOTE — DISCHARGE INSTRUCTIONS
Follow-up with primary care in 1 to 2 days for reevaluation, medication management and close blood pressure monitoring.  Repeat labs within 1 week encouraged for trending.  Follow-up with orthopedics as well for reevaluation and wound check.    Continue home medications as previously indicated.  Continue physical therapy as previously prescribed.    Return to the emergency department for increased pain, swelling, redness, wound drainage, fever or other new concerns.

## 2020-09-09 NOTE — ED TRIAGE NOTES
"Chief Complaint   Patient presents with   • Post-Op Complications     pt had left knee replacement 2 weeks ago, 1 week after she developed yellow drainage with increased swelling and fevers up to 101.8 at home.      Pt given 500mg of tylenol by EMS. Patient had her surgery at [facility] by Dr Vanegas.     /65   Pulse (!) 59   Temp 37.1 °C (98.7 °F) (Temporal)   Resp 16   Ht 1.702 m (5' 7\")   Wt 88 kg (194 lb)   LMP 06/02/2008   SpO2 95%   BMI 30.38 kg/m²     "

## 2020-09-10 ENCOUNTER — PHYSICAL THERAPY (OUTPATIENT)
Dept: PHYSICAL THERAPY | Facility: REHABILITATION | Age: 60
End: 2020-09-10
Attending: NURSE PRACTITIONER
Payer: MEDICAID

## 2020-09-10 DIAGNOSIS — M17.9 OSTEOARTHRITIS OF KNEE, UNSPECIFIED: ICD-10-CM

## 2020-09-10 PROCEDURE — 97014 ELECTRIC STIMULATION THERAPY: CPT

## 2020-09-10 PROCEDURE — 97110 THERAPEUTIC EXERCISES: CPT

## 2020-09-10 PROCEDURE — 97112 NEUROMUSCULAR REEDUCATION: CPT

## 2020-09-10 NOTE — OP THERAPY DAILY TREATMENT
"  Outpatient Physical Therapy  DAILY TREATMENT     [facility] [address].  [address]  [place] [address]  Phone:  [phone]  Fax:  [phone]    Date: 09/10/2020    Patient: April Alicia  YOB: 1960  MRN: [id number]     Time Calculation    Start time: 1000  Stop time: 1059 Time Calculation (min): 59 minutes         Chief Complaint: Knee Problem    Visit #: 4    SUBJECTIVE:  Patient is s/p :L TKA on 8/24/2020. D/c home ambulating WBAT with walker. Presenting to therapy today with walker.    The patient went the the ER again yesterday. No fever, no DVT per US, no abnormal findings.     She cannot recall the home exercises. She has been performing \"leg lifts\" every day or two several times.       OBJECTIVE:  Current objective measures:   L Knee AROM 15-65 deg  L knee PROM 10-72 deg    Gait: Pt ambulating with FWW today; flat foot and lacking knee extension at initial contact. Forward flexed posture and weight bearing heavily on UE's.             Therapeutic Exercises (CPT 21709):     1. Nustepper seat at 8 , x4 min L0, Repeated at end of appointment for 10 additional minutes, no charge, with seat at 7    2. AROM L knee flexion in sitting, seated with foot on skateboard AAROM x 5 min then seated with foot on pillowcase on floor x 3 min    3. L knee flexion stretch in sitting, moving hips forward once foot places, holding 1 min at a time, reviewed - poor carryover    4. L knee extension stretch foot on chair, 2 min, reviewed - poor carryover    7. Pt education, Reminder to perform hep hourly; educated to avoid resting with pillow under knee    8. Pt education, pt provided with reprint of handout of HEP, , Strongly emphasized to patient that pain is normal post-op and that she should be working on ROM through her pain. Reviewed red flags, but emphasized that in the absence of flags, pain and swelling are normal and to be expected post-op. This did appear to " "reassure the patient and the expressed her intention to perform the HEP hourly when awake, as instructed.       Therapeutic Exercise Summary: Access Code: [email or web address]   URL: [email or web address]   Date: 09/10/2020   Prepared by: Sophia Collins     Exercises  Seated Active Assistive Knee Extension and Flexion Foot on Floor - 7x weekly  Seated Knee Flexion Stretch - 7x weekly  Seated Knee Extension Stretch with Chair - 7x weekly    Therapeutic Treatments and Modalities:     1. E Stim Unattended (CPT 77628), Russian L quad 10/10 heel lifted with cold pack, 15 min    2. Neuromuscular Re-education (CPT 97684)    Therapeutic Treatment and Modalities Summary: NMR: cueing to stand \"up tall\" closer to walker, to reduce forward flexed posture. Repetition of this 1 min x 3 for glute/quad engagement.  Ball roll in supine AAROM with assitance from therapist 2 x 30 for knee flexion  Knee flexion stretch in supine - holding behind thigh at 90 deg hip flexion, foot dropping towards mat - static flexion stretch 30 sec x 5    Time-based treatments/modalities:    Physical Therapy Timed Treatment Charges  Neuromusc re-ed, balance, coor, post minutes (CPT 50471): 10 minutes  Therapeutic exercise minutes (CPT 96084): 15 minutes  ASSESSMENT:   Response to treatment:   Patient is 2 weeks s/p :L TKA on 8/24/2020. D/c home ambulating WBAT with walker.     Pt ambulating with FWW today; continued limited ROM and lacking TKE. Patient has not been compliant with HEP to date and had difficulty recalling the HEP. Focus of session was encouraging hourly ROM and TKE stretch exercises, and discussing pain management and expectations. The patient did have a good understanding of the HEP by the end of the session. If minimal progress in ROM at next visit, will discuss with surgeon and possibly increase PT visits to 3 x per week to mitigate effects of poor carryover of exercises.           PLAN/RECOMMENDATIONS:   Plan for treatment: therapy " treatment to continue next visit.  Planned interventions for next visit: continue with current treatment. Revisit gait training; review hep and compliance to pt education/hrep; continue with POC per primary therapist.     *Return 1 x therex, 1 x NMR, 1 x e-stim unattended, 1 x gait training  per visit.

## 2020-09-13 ENCOUNTER — APPOINTMENT (OUTPATIENT)
Dept: RADIOLOGY | Facility: MEDICAL CENTER | Age: 60
DRG: 920 | End: 2020-09-13
Attending: EMERGENCY MEDICINE
Payer: MEDICAID

## 2020-09-13 ENCOUNTER — HOSPITAL ENCOUNTER (INPATIENT)
Facility: MEDICAL CENTER | Age: 60
LOS: 2 days | DRG: 920 | End: 2020-09-15
Attending: EMERGENCY MEDICINE | Admitting: HOSPITALIST
Payer: MEDICAID

## 2020-09-13 DIAGNOSIS — M25.469 KNEE SWELLING: ICD-10-CM

## 2020-09-13 PROBLEM — M25.462 KNEE EFFUSION, LEFT: Status: ACTIVE | Noted: 2020-09-13

## 2020-09-13 LAB
ALBUMIN SERPL BCP-MCNC: 3.1 G/DL (ref 3.2–4.9)
ALBUMIN/GLOB SERPL: 0.9 G/DL
ALP SERPL-CCNC: 98 U/L (ref 30–99)
ALT SERPL-CCNC: 7 U/L (ref 2–50)
ANION GAP SERPL CALC-SCNC: 10 MMOL/L (ref 7–16)
AST SERPL-CCNC: 15 U/L (ref 12–45)
BASOPHILS # BLD AUTO: 1.1 % (ref 0–1.8)
BASOPHILS # BLD: 0.04 K/UL (ref 0–0.12)
BILIRUB SERPL-MCNC: 0.9 MG/DL (ref 0.1–1.5)
BUN SERPL-MCNC: 10 MG/DL (ref 8–22)
CALCIUM SERPL-MCNC: 8.3 MG/DL (ref 8.5–10.5)
CHLORIDE SERPL-SCNC: 102 MMOL/L (ref 96–112)
CO2 SERPL-SCNC: 22 MMOL/L (ref 20–33)
COVID ORDER STATUS COVID19: NORMAL
CREAT SERPL-MCNC: 0.79 MG/DL (ref 0.5–1.4)
CRP SERPL HS-MCNC: 2.68 MG/DL (ref 0–0.75)
EOSINOPHIL # BLD AUTO: 0.14 K/UL (ref 0–0.51)
EOSINOPHIL NFR BLD: 3.9 % (ref 0–6.9)
ERYTHROCYTE [DISTWIDTH] IN BLOOD BY AUTOMATED COUNT: 49.2 FL (ref 35.9–50)
GLOBULIN SER CALC-MCNC: 3.5 G/DL (ref 1.9–3.5)
GLUCOSE SERPL-MCNC: 132 MG/DL (ref 65–99)
HCT VFR BLD AUTO: 25.1 % (ref 37–47)
HGB BLD-MCNC: 7.6 G/DL (ref 12–16)
IMM GRANULOCYTES # BLD AUTO: 0.01 K/UL (ref 0–0.11)
IMM GRANULOCYTES NFR BLD AUTO: 0.3 % (ref 0–0.9)
LYMPHOCYTES # BLD AUTO: 0.65 K/UL (ref 1–4.8)
LYMPHOCYTES NFR BLD: 18.2 % (ref 22–41)
MCH RBC QN AUTO: 26.3 PG (ref 27–33)
MCHC RBC AUTO-ENTMCNC: 30.3 G/DL (ref 33.6–35)
MCV RBC AUTO: 86.9 FL (ref 81.4–97.8)
MONOCYTES # BLD AUTO: 0.31 K/UL (ref 0–0.85)
MONOCYTES NFR BLD AUTO: 8.7 % (ref 0–13.4)
NEUTROPHILS # BLD AUTO: 2.42 K/UL (ref 2–7.15)
NEUTROPHILS NFR BLD: 67.8 % (ref 44–72)
NRBC # BLD AUTO: 0 K/UL
NRBC BLD-RTO: 0 /100 WBC
PLATELET # BLD AUTO: 138 K/UL (ref 164–446)
PMV BLD AUTO: 9.1 FL (ref 9–12.9)
POTASSIUM SERPL-SCNC: 4 MMOL/L (ref 3.6–5.5)
PROT SERPL-MCNC: 6.6 G/DL (ref 6–8.2)
RBC # BLD AUTO: 2.89 M/UL (ref 4.2–5.4)
SODIUM SERPL-SCNC: 134 MMOL/L (ref 135–145)
WBC # BLD AUTO: 3.6 K/UL (ref 4.8–10.8)

## 2020-09-13 PROCEDURE — 99223 1ST HOSP IP/OBS HIGH 75: CPT | Performed by: HOSPITALIST

## 2020-09-13 PROCEDURE — 99285 EMERGENCY DEPT VISIT HI MDM: CPT

## 2020-09-13 PROCEDURE — 36415 COLL VENOUS BLD VENIPUNCTURE: CPT

## 2020-09-13 PROCEDURE — 93971 EXTREMITY STUDY: CPT | Mod: LT

## 2020-09-13 PROCEDURE — C9803 HOPD COVID-19 SPEC COLLECT: HCPCS | Performed by: HOSPITALIST

## 2020-09-13 PROCEDURE — 73564 X-RAY EXAM KNEE 4 OR MORE: CPT | Mod: LT

## 2020-09-13 PROCEDURE — 80053 COMPREHEN METABOLIC PANEL: CPT

## 2020-09-13 PROCEDURE — 86140 C-REACTIVE PROTEIN: CPT

## 2020-09-13 PROCEDURE — 85025 COMPLETE CBC W/AUTO DIFF WBC: CPT

## 2020-09-13 PROCEDURE — 770001 HCHG ROOM/CARE - MED/SURG/GYN PRIV*

## 2020-09-13 PROCEDURE — U0003 INFECTIOUS AGENT DETECTION BY NUCLEIC ACID (DNA OR RNA); SEVERE ACUTE RESPIRATORY SYNDROME CORONAVIRUS 2 (SARS-COV-2) (CORONAVIRUS DISEASE [COVID-19]), AMPLIFIED PROBE TECHNIQUE, MAKING USE OF HIGH THROUGHPUT TECHNOLOGIES AS DESCRIBED BY CMS-2020-01-R: HCPCS

## 2020-09-13 RX ORDER — IBUPROFEN 200 MG
500 CAPSULE ORAL DAILY
COMMUNITY
End: 2022-07-10

## 2020-09-13 RX ORDER — ONDANSETRON 4 MG/1
4 TABLET, ORALLY DISINTEGRATING ORAL EVERY 6 HOURS PRN
COMMUNITY
End: 2022-07-10

## 2020-09-13 RX ORDER — PANTOPRAZOLE SODIUM 20 MG/1
20 TABLET, DELAYED RELEASE ORAL DAILY
Status: ON HOLD | COMMUNITY
End: 2023-01-10

## 2020-09-13 RX ORDER — LIDOCAINE 50 MG/G
1 PATCH TOPICAL EVERY 24 HOURS
COMMUNITY
End: 2022-07-10

## 2020-09-13 ASSESSMENT — FIBROSIS 4 INDEX: FIB4 SCORE: 1.13

## 2020-09-14 ENCOUNTER — APPOINTMENT (OUTPATIENT)
Dept: PHYSICAL THERAPY | Facility: REHABILITATION | Age: 60
End: 2020-09-14
Attending: NURSE PRACTITIONER
Payer: MEDICAID

## 2020-09-14 LAB
BACTERIA BLD CULT: NORMAL
BACTERIA BLD CULT: NORMAL
GLUCOSE BLD-MCNC: 101 MG/DL (ref 65–99)
GLUCOSE BLD-MCNC: 115 MG/DL (ref 65–99)
GLUCOSE BLD-MCNC: 185 MG/DL (ref 65–99)
GLUCOSE BLD-MCNC: 83 MG/DL (ref 65–99)
GLUCOSE BLD-MCNC: 91 MG/DL (ref 65–99)
GRAM STN SPEC: NORMAL
SARS-COV-2 RNA RESP QL NAA+PROBE: NOTDETECTED
SIGNIFICANT IND 70042: NORMAL
SITE SITE: NORMAL
SOURCE SOURCE: NORMAL
SPECIMEN SOURCE: NORMAL

## 2020-09-14 PROCEDURE — 700105 HCHG RX REV CODE 258

## 2020-09-14 PROCEDURE — 770001 HCHG ROOM/CARE - MED/SURG/GYN PRIV*

## 2020-09-14 PROCEDURE — A9270 NON-COVERED ITEM OR SERVICE: HCPCS | Performed by: HOSPITALIST

## 2020-09-14 PROCEDURE — 99232 SBSQ HOSP IP/OBS MODERATE 35: CPT | Performed by: HOSPITALIST

## 2020-09-14 PROCEDURE — 99406 BEHAV CHNG SMOKING 3-10 MIN: CPT

## 2020-09-14 PROCEDURE — 82962 GLUCOSE BLOOD TEST: CPT | Mod: 91

## 2020-09-14 PROCEDURE — 0S9D3ZX DRAINAGE OF LEFT KNEE JOINT, PERCUTANEOUS APPROACH, DIAGNOSTIC: ICD-10-PCS | Performed by: ORTHOPAEDIC SURGERY

## 2020-09-14 PROCEDURE — 700111 HCHG RX REV CODE 636 W/ 250 OVERRIDE (IP): Performed by: HOSPITALIST

## 2020-09-14 PROCEDURE — 87205 SMEAR GRAM STAIN: CPT

## 2020-09-14 PROCEDURE — 700102 HCHG RX REV CODE 250 W/ 637 OVERRIDE(OP): Performed by: HOSPITALIST

## 2020-09-14 PROCEDURE — 87070 CULTURE OTHR SPECIMN AEROBIC: CPT

## 2020-09-14 PROCEDURE — 700111 HCHG RX REV CODE 636 W/ 250 OVERRIDE (IP): Performed by: NURSE PRACTITIONER

## 2020-09-14 RX ORDER — MORPHINE SULFATE 4 MG/ML
2-4 INJECTION, SOLUTION INTRAMUSCULAR; INTRAVENOUS
Status: COMPLETED | OUTPATIENT
Start: 2020-09-14 | End: 2020-09-14

## 2020-09-14 RX ORDER — ONDANSETRON 2 MG/ML
4 INJECTION INTRAMUSCULAR; INTRAVENOUS EVERY 4 HOURS PRN
Status: DISCONTINUED | OUTPATIENT
Start: 2020-09-14 | End: 2020-09-15 | Stop reason: HOSPADM

## 2020-09-14 RX ORDER — ENALAPRILAT 1.25 MG/ML
1.25 INJECTION INTRAVENOUS EVERY 6 HOURS PRN
Status: DISCONTINUED | OUTPATIENT
Start: 2020-09-14 | End: 2020-09-15 | Stop reason: HOSPADM

## 2020-09-14 RX ORDER — PROMETHAZINE HYDROCHLORIDE 25 MG/1
12.5-25 TABLET ORAL EVERY 4 HOURS PRN
Status: DISCONTINUED | OUTPATIENT
Start: 2020-09-14 | End: 2020-09-15 | Stop reason: HOSPADM

## 2020-09-14 RX ORDER — SODIUM CHLORIDE 9 MG/ML
INJECTION, SOLUTION INTRAVENOUS
Status: COMPLETED
Start: 2020-09-14 | End: 2020-09-14

## 2020-09-14 RX ORDER — LOSARTAN POTASSIUM 50 MG/1
100 TABLET ORAL DAILY
Status: DISCONTINUED | OUTPATIENT
Start: 2020-09-14 | End: 2020-09-15 | Stop reason: HOSPADM

## 2020-09-14 RX ORDER — HEPARIN SODIUM 5000 [USP'U]/ML
5000 INJECTION, SOLUTION INTRAVENOUS; SUBCUTANEOUS EVERY 12 HOURS
Status: DISCONTINUED | OUTPATIENT
Start: 2020-09-14 | End: 2020-09-15 | Stop reason: HOSPADM

## 2020-09-14 RX ORDER — GABAPENTIN 400 MG/1
400 CAPSULE ORAL 2 TIMES DAILY
Status: DISCONTINUED | OUTPATIENT
Start: 2020-09-14 | End: 2020-09-15 | Stop reason: HOSPADM

## 2020-09-14 RX ORDER — DEXTROSE MONOHYDRATE 25 G/50ML
50 INJECTION, SOLUTION INTRAVENOUS
Status: DISCONTINUED | OUTPATIENT
Start: 2020-09-14 | End: 2020-09-15 | Stop reason: HOSPADM

## 2020-09-14 RX ORDER — OMEPRAZOLE 20 MG/1
40 CAPSULE, DELAYED RELEASE ORAL DAILY
Status: DISCONTINUED | OUTPATIENT
Start: 2020-09-14 | End: 2020-09-15 | Stop reason: HOSPADM

## 2020-09-14 RX ORDER — AMOXICILLIN 250 MG
2 CAPSULE ORAL 2 TIMES DAILY
Status: DISCONTINUED | OUTPATIENT
Start: 2020-09-14 | End: 2020-09-15 | Stop reason: HOSPADM

## 2020-09-14 RX ORDER — ONDANSETRON 4 MG/1
4 TABLET, ORALLY DISINTEGRATING ORAL EVERY 4 HOURS PRN
Status: DISCONTINUED | OUTPATIENT
Start: 2020-09-14 | End: 2020-09-15 | Stop reason: HOSPADM

## 2020-09-14 RX ORDER — PROMETHAZINE HYDROCHLORIDE 25 MG/1
12.5-25 SUPPOSITORY RECTAL EVERY 4 HOURS PRN
Status: DISCONTINUED | OUTPATIENT
Start: 2020-09-14 | End: 2020-09-15 | Stop reason: HOSPADM

## 2020-09-14 RX ORDER — POLYETHYLENE GLYCOL 3350 17 G/17G
1 POWDER, FOR SOLUTION ORAL
Status: DISCONTINUED | OUTPATIENT
Start: 2020-09-14 | End: 2020-09-15 | Stop reason: HOSPADM

## 2020-09-14 RX ORDER — PROCHLORPERAZINE EDISYLATE 5 MG/ML
5-10 INJECTION INTRAMUSCULAR; INTRAVENOUS EVERY 4 HOURS PRN
Status: DISCONTINUED | OUTPATIENT
Start: 2020-09-14 | End: 2020-09-15 | Stop reason: HOSPADM

## 2020-09-14 RX ORDER — BISACODYL 10 MG
10 SUPPOSITORY, RECTAL RECTAL
Status: DISCONTINUED | OUTPATIENT
Start: 2020-09-14 | End: 2020-09-15 | Stop reason: HOSPADM

## 2020-09-14 RX ORDER — OXYCODONE HYDROCHLORIDE 5 MG/1
5 TABLET ORAL EVERY 4 HOURS PRN
Status: DISCONTINUED | OUTPATIENT
Start: 2020-09-14 | End: 2020-09-15 | Stop reason: HOSPADM

## 2020-09-14 RX ORDER — ALBUTEROL SULFATE 90 UG/1
2 AEROSOL, METERED RESPIRATORY (INHALATION) EVERY 6 HOURS PRN
Status: DISCONTINUED | OUTPATIENT
Start: 2020-09-14 | End: 2020-09-15 | Stop reason: HOSPADM

## 2020-09-14 RX ORDER — ACETAMINOPHEN 325 MG/1
650 TABLET ORAL EVERY 6 HOURS PRN
Status: DISCONTINUED | OUTPATIENT
Start: 2020-09-14 | End: 2020-09-15 | Stop reason: HOSPADM

## 2020-09-14 RX ORDER — METFORMIN HYDROCHLORIDE 500 MG/1
500 TABLET, EXTENDED RELEASE ORAL EVERY EVENING
Status: DISCONTINUED | OUTPATIENT
Start: 2020-09-14 | End: 2020-09-15 | Stop reason: HOSPADM

## 2020-09-14 RX ORDER — FELODIPINE 5 MG/1
5 TABLET, EXTENDED RELEASE ORAL EVERY EVENING
Status: DISCONTINUED | OUTPATIENT
Start: 2020-09-14 | End: 2020-09-15 | Stop reason: HOSPADM

## 2020-09-14 RX ADMIN — GLYCOPYRROLATE 1 CAPSULE: 15.6 CAPSULE RESPIRATORY (INHALATION) at 21:11

## 2020-09-14 RX ADMIN — FELODIPINE 5 MG: 5 TABLET, EXTENDED RELEASE ORAL at 16:59

## 2020-09-14 RX ADMIN — GLYCOPYRROLATE 1 CAPSULE: 15.6 CAPSULE RESPIRATORY (INHALATION) at 08:52

## 2020-09-14 RX ADMIN — OXYCODONE HYDROCHLORIDE 5 MG: 5 TABLET ORAL at 00:41

## 2020-09-14 RX ADMIN — INSULIN HUMAN 1 UNITS: 100 INJECTION, SOLUTION PARENTERAL at 17:02

## 2020-09-14 RX ADMIN — METFORMIN HYDROCHLORIDE 500 MG: 500 TABLET, EXTENDED RELEASE ORAL at 16:59

## 2020-09-14 RX ADMIN — LOSARTAN POTASSIUM 100 MG: 50 TABLET, FILM COATED ORAL at 04:57

## 2020-09-14 RX ADMIN — OXYCODONE HYDROCHLORIDE 5 MG: 5 TABLET ORAL at 16:59

## 2020-09-14 RX ADMIN — DOCUSATE SODIUM 50 MG AND SENNOSIDES 8.6 MG 2 TABLET: 8.6; 5 TABLET, FILM COATED ORAL at 16:59

## 2020-09-14 RX ADMIN — GABAPENTIN 400 MG: 400 CAPSULE ORAL at 12:55

## 2020-09-14 RX ADMIN — OXYCODONE HYDROCHLORIDE 5 MG: 5 TABLET ORAL at 04:57

## 2020-09-14 RX ADMIN — MORPHINE SULFATE 4 MG: 4 INJECTION INTRAVENOUS at 02:43

## 2020-09-14 RX ADMIN — DRONEDARONE 400 MG: 400 TABLET, FILM COATED ORAL at 16:59

## 2020-09-14 RX ADMIN — OXYCODONE HYDROCHLORIDE 5 MG: 5 TABLET ORAL at 08:57

## 2020-09-14 RX ADMIN — HEPARIN SODIUM 5000 UNITS: 5000 INJECTION, SOLUTION INTRAVENOUS; SUBCUTANEOUS at 17:00

## 2020-09-14 RX ADMIN — ACETAMINOPHEN 650 MG: 325 TABLET, FILM COATED ORAL at 00:41

## 2020-09-14 RX ADMIN — GABAPENTIN 400 MG: 400 CAPSULE ORAL at 02:10

## 2020-09-14 RX ADMIN — SODIUM CHLORIDE 1000 ML: 9 INJECTION, SOLUTION INTRAVENOUS at 02:09

## 2020-09-14 RX ADMIN — OXYCODONE HYDROCHLORIDE 5 MG: 5 TABLET ORAL at 21:10

## 2020-09-14 RX ADMIN — GABAPENTIN 400 MG: 400 CAPSULE ORAL at 23:21

## 2020-09-14 RX ADMIN — OXYCODONE HYDROCHLORIDE 5 MG: 5 TABLET ORAL at 12:56

## 2020-09-14 RX ADMIN — DRONEDARONE 400 MG: 400 TABLET, FILM COATED ORAL at 08:52

## 2020-09-14 ASSESSMENT — PAIN DESCRIPTION - PAIN TYPE
TYPE: ACUTE PAIN

## 2020-09-14 ASSESSMENT — COGNITIVE AND FUNCTIONAL STATUS - GENERAL
MOVING TO AND FROM BED TO CHAIR: A LITTLE
MOBILITY SCORE: 18
SUGGESTED CMS G CODE MODIFIER MOBILITY: CK
MOVING FROM LYING ON BACK TO SITTING ON SIDE OF FLAT BED: A LITTLE
CLIMB 3 TO 5 STEPS WITH RAILING: A LOT
DRESSING REGULAR UPPER BODY CLOTHING: A LITTLE
TOILETING: A LITTLE
DAILY ACTIVITIY SCORE: 22
STANDING UP FROM CHAIR USING ARMS: A LITTLE
SUGGESTED CMS G CODE MODIFIER DAILY ACTIVITY: CJ
WALKING IN HOSPITAL ROOM: A LITTLE

## 2020-09-14 ASSESSMENT — PATIENT HEALTH QUESTIONNAIRE - PHQ9
SUM OF ALL RESPONSES TO PHQ QUESTIONS 1-9: 6
3. TROUBLE FALLING OR STAYING ASLEEP OR SLEEPING TOO MUCH: SEVERAL DAYS
8. MOVING OR SPEAKING SO SLOWLY THAT OTHER PEOPLE COULD HAVE NOTICED. OR THE OPPOSITE, BEING SO FIGETY OR RESTLESS THAT YOU HAVE BEEN MOVING AROUND A LOT MORE THAN USUAL: NOT AT ALL
4. FEELING TIRED OR HAVING LITTLE ENERGY: SEVERAL DAYS
7. TROUBLE CONCENTRATING ON THINGS, SUCH AS READING THE NEWSPAPER OR WATCHING TELEVISION: NOT AT ALL
9. THOUGHTS THAT YOU WOULD BE BETTER OFF DEAD, OR OF HURTING YOURSELF: NOT AT ALL
1. LITTLE INTEREST OR PLEASURE IN DOING THINGS: SEVERAL DAYS
5. POOR APPETITE OR OVEREATING: SEVERAL DAYS
2. FEELING DOWN, DEPRESSED, IRRITABLE, OR HOPELESS: SEVERAL DAYS
6. FEELING BAD ABOUT YOURSELF - OR THAT YOU ARE A FAILURE OR HAVE LET YOURSELF OR YOUR FAMILY DOWN: SEVERAL DAYS
SUM OF ALL RESPONSES TO PHQ9 QUESTIONS 1 AND 2: 2

## 2020-09-14 ASSESSMENT — LIFESTYLE VARIABLES
EVER FELT BAD OR GUILTY ABOUT YOUR DRINKING: NO
ALCOHOL_USE: NO
DOES PATIENT WANT TO STOP DRINKING: NO
TOTAL SCORE: 0
HAVE YOU EVER FELT YOU SHOULD CUT DOWN ON YOUR DRINKING: NO
CONSUMPTION TOTAL: NEGATIVE
ON A TYPICAL DAY WHEN YOU DRINK ALCOHOL HOW MANY DRINKS DO YOU HAVE: 0
TOTAL SCORE: 0
EVER HAD A DRINK FIRST THING IN THE MORNING TO STEADY YOUR NERVES TO GET RID OF A HANGOVER: NO
HOW MANY TIMES IN THE PAST YEAR HAVE YOU HAD 5 OR MORE DRINKS IN A DAY: 0
AVERAGE NUMBER OF DAYS PER WEEK YOU HAVE A DRINK CONTAINING ALCOHOL: 0
HAVE PEOPLE ANNOYED YOU BY CRITICIZING YOUR DRINKING: NO
TOTAL SCORE: 0

## 2020-09-14 ASSESSMENT — ENCOUNTER SYMPTOMS
SHORTNESS OF BREATH: 0
NERVOUS/ANXIOUS: 1
ABDOMINAL PAIN: 0
FALLS: 0
LOSS OF CONSCIOUSNESS: 0
PALPITATIONS: 0
HEADACHES: 0
COUGH: 0
FEVER: 1
VOMITING: 0
NAUSEA: 0
CHILLS: 0
DIZZINESS: 0

## 2020-09-14 NOTE — CARE PLAN
Problem: Safety  Goal: Will remain free from injury  Outcome: PROGRESSING AS EXPECTED  Patient calls appropriately.     Problem: Knowledge Deficit  Goal: Knowledge of disease process/condition, treatment plan, diagnostic tests, and medications will improve  Outcome: PROGRESSING AS EXPECTED   Patient aware of POC.  All questions answered.

## 2020-09-14 NOTE — H&P
Hospital Medicine History & Physical Note    Date of Service  9/14/2020    PCP: Juanita King N.P.    CC:  Knee pain    HPI:   This is a 60 y.o. female with Left total knee replacement on 8/24/2020 ([place]) who returns to hospital with complaints of pain swelling and redness at left knee surgical site. Denies fevers. Denies trauma to the knee since surgery, she has been doing wound care at home as directed. She is on Eliquis because of previous postoperative DVT.  She has not taken that today or yesterday because she was planning coming to the ER.     US:  Left lower extremity -.    Complete color filling and compressibility with normal venous flow dynamics    including spontaneous flow, response to augmentation maneuvers, and    respiratory phasicity.    No evidence of superficial or deep venous thrombosis.       Flow was evaluated in the contralateral common femoral vein and normal    venous flow dynamics including spontaneous flow, respiratory phasic    variation and augmentation were demonstrated.     I discussed this patient's case with Dr Harvey of the emergency department.     Consultants:   Ortho    ROS: As above. The remainder of a complete review of systems is negative in all systems except as noted.    PMHx:   has a past medical history of Arrhythmia, Arthritis, ASTHMA, Blood clotting disorder (HCC) (2018), Dental disorder, Diabetes, Emphysema of lung ([facility]), Heart abnormality, Heart burn, Heart valve disease, Hypertension, Infection (9/4/2017), Infection (2018), Liver disease, Pneumonia (02/2020), and Seizure disorder ([facility]). She also has no past medical history of CAD (coronary artery disease), COPD, Psychiatric disorder, or Stroke ([facility]).    PSHx:   has a past surgical history that includes gyn surgery (1982); gyn surgery (2003); colonoscopy with clipping (10/28/2015); colonoscopy with sclerotherapy (10/28/2015); colonoscopy with tattooing (10/28/2015); irrigation & debridement ortho (Right, 9/3/2017);  knee arthroplasty total (Right, 2018); and pr total knee arthroplasty (Left, 8/24/2020).    SHx:   reports that she has been smoking cigarettes. She has never used smokeless tobacco. She reports that she does not drink alcohol or use drugs.    FHx:  Reviewed with patient, no pertinent family history noted.    Allergies:  Allergies   Allergen Reactions   • Nkda [No Known Drug Allergy]        Medications:  No current facility-administered medications on file prior to encounter.      Current Outpatient Medications on File Prior to Encounter   Medication Sig Dispense Refill   • Diclofenac Sodium (VOLTAREN) 1 % Gel Apply  to skin as directed every 7 days.     • lidocaine (LIDODERM) 5 % Patch Apply 1 Patch to skin as directed every 24 hours. Apply 1 patch to feet or wrist q 24 h prn     • ondansetron (ZOFRAN ODT) 4 MG TABLET DISPERSIBLE Take 4 mg by mouth every 6 hours as needed for Nausea.     • calcium carbonate (OS-DAILY 500) 1250 (500 Ca) MG Tab Take 500 mg by mouth every day.     • pantoprazole (PROTONIX) 40 MG Tablet Delayed Response Take 40 mg by mouth every day.     • Prenatal Vit-Fe Fumarate-FA (PRENATAL VITAMIN WITH FE/FA) 27-0.8 MG Tab      • MULTAQ 400 MG Tab Take 400 mg by mouth 2 times a day, with meals.     • felodipine (PLENDIL) 5 MG TABLET SR 24 HR Take 5 mg by mouth every evening.     • gabapentin (NEURONTIN) 400 MG Cap Take 400 mg by mouth 2 times a day.     • metFORMIN ER (GLUCOPHAGE XR) 500 MG TABLET SR 24 HR Take 500 mg by mouth every evening.     • potassium chloride ER (KLOR-CON) 10 MEQ tablet Take 10 mEq by mouth every day.     • tiotropium (SPIRIVA HANDIHALER) 18 MCG Cap Inhale 18 mcg by mouth every day.     • ascorbic acid (ASCORBIC ACID) 500 MG Tab Take 500 mg by mouth every day.     • apixaban (ELIQUIS) 5mg Tab Take 5 mg by mouth 2 Times a Day.     • oxyCODONE immediate-release (ROXICODONE) 5 MG Tab Take 5 mg by mouth every four hours as needed for Severe Pain.     • magnesium oxide (MAG-OX) 400  "MG Tab Take 400 mg by mouth every day.     • docusate sodium (COLACE) 100 MG Cap Take 100 mg by mouth 2 times a day.     • losartan (COZAAR) 100 MG Tab Take 100 mg by mouth every day.     • albuterol (VENTOLIN OR PROVENTIL) 108 (90 BASE) MCG/ACT Aero Soln inhalation aerosol Inhale 2 Puffs by mouth every 6 hours as needed for Shortness of Breath.         Objective Exam:  Vitals:    09/13/20 1940 09/13/20 1951 09/13/20 2136   BP: 142/110  140/74   Pulse: 78  70   Resp: 16     Temp: 36.9 °C (98.4 °F)     TempSrc: Temporal     SpO2: 95%  96%   Weight:  84.9 kg (187 lb 2.7 oz)    Height:  1.702 m (5' 7\")        Physical Exam   Constitutional: She is oriented to person, place, and time. She appears distressed.   HENT:   Head: Normocephalic and atraumatic.   Mouth/Throat: Oropharynx is clear and moist.   Eyes: Conjunctivae and EOM are normal. No scleral icterus.   Neck: Normal range of motion.   Cardiovascular: Normal rate, regular rhythm, normal heart sounds and intact distal pulses.   Pulmonary/Chest: Effort normal and breath sounds normal. No stridor. No respiratory distress. She has no wheezes. She has no rales.   Abdominal: Soft. She exhibits no distension. There is no guarding.   Musculoskeletal:         General: Tenderness and edema present.      Comments: Well healed right knee scar    Left knee with redness and swelling, irregular healing suture line, tender to touch   Neurological: She is alert and oriented to person, place, and time. No cranial nerve deficit.   Skin: Skin is warm and dry. No rash noted. She is not diaphoretic. There is erythema.   Psychiatric: She has a normal mood and affect. Her behavior is normal.   Nursing note and vitals reviewed.      Laboratory--reviewed personally and are as follows:  Lab Results   Component Value Date/Time    WBC 3.6 (L) 09/13/2020 09:30 PM    RBC 2.89 (L) 09/13/2020 09:30 PM    HEMOGLOBIN 7.6 (L) 09/13/2020 09:30 PM    HEMATOCRIT 25.1 (L) 09/13/2020 09:30 PM    MCV " 86.9 09/13/2020 09:30 PM    MCH 26.3 (L) 09/13/2020 09:30 PM    MCHC 30.3 (L) 09/13/2020 09:30 PM    MPV 9.1 09/13/2020 09:30 PM    NEUTSPOLYS 67.80 09/13/2020 09:30 PM    LYMPHOCYTES 18.20 (L) 09/13/2020 09:30 PM    MONOCYTES 8.70 09/13/2020 09:30 PM    EOSINOPHILS 3.90 09/13/2020 09:30 PM    BASOPHILS 1.10 09/13/2020 09:30 PM    HYPOCHROMIA 1+ 03/12/2009 06:50 PM    ANISOCYTOSIS 1+ 06/11/2006 05:30 AM      Lab Results   Component Value Date/Time    SODIUM 134 (L) 09/13/2020 09:30 PM    POTASSIUM 4.0 09/13/2020 09:30 PM    CHLORIDE 102 09/13/2020 09:30 PM    CO2 22 09/13/2020 09:30 PM    GLUCOSE 132 (H) 09/13/2020 09:30 PM    BUN 10 09/13/2020 09:30 PM    CREATININE 0.79 09/13/2020 09:30 PM    CREATININE 0.9 03/12/2009 06:50 PM      Lab Results   Component Value Date/Time    PROTHROMBTM 16.3 (H) 09/04/2017 05:47 AM    INR 1.27 (H) 09/04/2017 05:47 AM        Radiology  DX-KNEE COMPLETE 4+ LEFT   Final Result      1.  Large knee joint effusion   2.  Total knee arthroplasty hardware in place      US-EXTREMITY VENOUS LOWER UNILAT LEFT   Final Result          Assessment/Plan:  * Knee effusion, left  Assessment & Plan  With recent surgery  Worsening with redness and swelling  Very painful  NPO @ [facility]  Plans for OR time in the morning with surgery, consulted from ED    Acquired circulating anticoagulants (HCC)- (present on admission)  Assessment & Plan  Hold AC due to surgery    Primary osteoarthritis of left knee- (present on admission)  Assessment & Plan  Hx BL knee replacements    Cirrhosis (HCC)- (present on admission)  Assessment & Plan  With pancytopenia    Hypertension- (present on admission)  Assessment & Plan  Resume home meds with holding parameters    Controlled type 2 diabetes mellitus with hyperglycemia, without long-term current use of insulin (HCC)- (present on admission)  Assessment & Plan  Resume metformin with ISS       It is expected patient will stay beyond 2 midnights.    VTE prophylaxis: heparin

## 2020-09-14 NOTE — OP THERAPY DAILY TREATMENT
"  Outpatient Physical Therapy  DAILY TREATMENT     [facility] [address]  [address]  [place] [address]  Phone:  [phone]  Fax:  [phone]    Date: 09/14/2020    Patient: April Alicia  YOB: 1960  MRN: [id number]     Time Calculation                   Chief Complaint: No chief complaint on file.    Visit #: 5    SUBJECTIVE:  Patient is s/p :L TKA on 8/24/2020. D/c home ambulating WBAT with walker. Presenting to therapy today with walker.    The patient went the the ER again yesterday. No fever, no DVT per US, no abnormal findings.     She cannot recall the home exercises. She has been performing \"leg lifts\" every day or two several times.       OBJECTIVE:  Current objective measures:   L Knee AROM 15-65 deg  L knee PROM 10-72 deg    Gait: Pt ambulating with FWW today; flat foot and lacking knee extension at initial contact. Forward flexed posture and weight bearing heavily on UE's.             Therapeutic Exercises (CPT 19292):     1. Nustepper seat at 8 , x4 min L0, Repeated at end of appointment for 10 additional minutes, no charge, with seat at 7    2. AROM R knee flexion in sitting, seated with foot on skateboard AAROM x 5 min then seated with foot on pillowcase on floor x 3 min    3. R knee flexion stretch in sitting, moving hips forward once foot places, holding 1 min at a time, reviewed - poor carryover    4. R knee extension stretch foot on chair, 2 min, reviewed - poor carryover    7. Pt education, Reminder to perform hep hourly; educated to avoid resting with pillow under knee    8. Pt education, pt provided with reprint of handout of HEP, , Strongly emphasized to patient that pain is normal post-op and that she should be working on ROM through her pain. Reviewed red flags, but emphasized that in the absence of flags, pain and swelling are normal and to be expected post-op. This did appear to reassure the patient and the expressed her " "intention to perform the HEP hourly when awake, as instructed.       Therapeutic Exercise Summary: Access Code: [email or web address]   URL: [email or web address]   Date: 09/10/2020   Prepared by: Sophia Collins     Exercises  Seated Active Assistive Knee Extension and Flexion Foot on Floor - 7x weekly  Seated Knee Flexion Stretch - 7x weekly  Seated Knee Extension Stretch with Chair - 7x weekly    Therapeutic Treatments and Modalities:     1. E Stim Unattended (CPT 99908), Russian L quad 10/10 heel lifted with cold pack, 15 min    2. Neuromuscular Re-education (CPT 58033)    Therapeutic Treatment and Modalities Summary: NMR: cueing to stand \"up tall\" closer to walker, to reduce forward flexed posture. Repetition of this 1 min x 3 for glute/quad engagement.  Ball roll in supine AAROM with assitance from therapist 2 x 30 for knee flexion  Knee flexion stretch in supine - holding behind thigh at 90 deg hip flexion, foot dropping towards mat - static flexion stretch 30 sec x 5    Time-based treatments/modalities:       ASSESSMENT:   Response to treatment:   Patient is 2 weeks s/p :L TKA on 8/24/2020. D/c home ambulating WBAT with walker.     Pt ambulating with FWW today; continued limited ROM and lacking TKE. Patient has not been compliant with HEP to date and had difficulty recalling the HEP. Focus of session was encouraging hourly ROM and TKE stretch exercises, and discussing pain management and expectations. The patient did have a good understanding of the HEP by the end of the session. If minimal progress in ROM at next visit, will discuss with surgeon and possibly increase PT visits to 3 x per week to mitigate effects of poor carryover of exercises.           PLAN/RECOMMENDATIONS:   Plan for treatment: therapy treatment to continue next visit.  Planned interventions for next visit: continue with current treatment. Revisit gait training; review hep and compliance to pt education/hrep; continue with POC per primary " therapist.     *Return 1 x therex, 1 x NMR, 1 x e-stim unattended, 1 x gait training  per visit.

## 2020-09-14 NOTE — ASSESSMENT & PLAN NOTE
BP intermittently elevated.   - continue home dronedarone, felodipine and losartan  - vasotec IV PRN

## 2020-09-14 NOTE — ED PROVIDER NOTES
ED Provider Note    CHIEF COMPLAINT  Chief Complaint   Patient presents with   • Post-Op Complications     L TKA - Dr. Faustin: 8/24/2020   • Wound Check     L knee, purulent drainage.       HPI  April Alicia is a 60 y.o. female who presents complaining of worsening pain and swelling of her left knee.  The patient is status post a total knee replacement on the 24th by Dr. Faustin.  This is now her third visit for pain and swelling.  She is not seen Dr. Faustin for a week and a half.  Her first visit here she was seen for calf pain had a negative DVT, second visit she had lab work and an ultrasound again which were unrevealing.  She is on Eliquis because of previous postoperative DVT.  She has not taken that today or yesterday because she was planning coming to the ER.  She states that the knee has gotten so swollen she cannot bend it very well.  She had some drainage from it the other day, none presently.  She does not know if she is had a fever or not.  There is been no chest pain or shortness of breath.  No leg pain or swelling on the other side.  She denies any syncope or near to me.  No cough or cold symptoms.  There is no other complaint.    PAST MEDICAL HISTORY  Past Medical History:   Diagnosis Date   • Arrhythmia    • Arthritis     OA in knees   • ASTHMA    • Blood clotting disorder (HCC) 2018    right leg blood clot   • Dental disorder     upper and lower dentures   • Diabetes    • Emphysema of lung (HCC)    • Heart abnormality     leakage in left valve   • Heart burn     left knee   • Heart valve disease    • Hypertension    • Infection 9/4/2017   • Infection 2018    Right knee after total knee   • Liver disease    • Pneumonia 02/2020   • Seizure disorder (HCC)        FAMILY HISTORY  No family history on file.    SOCIAL HISTORY  Social History     Tobacco Use   • Smoking status: Current Some Day Smoker     Types: Cigarettes     Last attempt to quit: 12/29/2016     Years since quitting: 3.7   •  "Smokeless tobacco: Never Used   • Tobacco comment: a few a day when I can   Substance Use Topics   • Alcohol use: No   • Drug use: No         SURGICAL HISTORY  Past Surgical History:   Procedure Laterality Date   • PB TOTAL KNEE ARTHROPLASTY Left 8/24/2020    Procedure: ARTHROPLASTY, KNEE, TOTAL;  Surgeon: Víctor Faustin M.D.;  Location: SURGERY [facility];  Service: Orthopedics   • KNEE ARTHROPLASTY TOTAL Right 2018   • IRRIGATION & DEBRIDEMENT ORTHO Right 9/3/2017    Procedure: IRRIGATION & DEBRIDEMENT ORTHO, POLY EXCHANGE;  Surgeon: Jerome Russell M.D.;  Location: SURGERY [facility];  Service: Orthopedics   • COLONOSCOPY WITH CLIPPING  10/28/2015    Procedure: COLONOSCOPY WITH CLIPPING;  Surgeon: Ruben Colon M.D.;  Location: ENDOSCOPY [facility];  Service:    • COLONOSCOPY WITH SCLEROTHERAPY  10/28/2015    Procedure: COLONOSCOPY WITH SCLEROTHERAPY;  Surgeon: Ruben Colon M.D.;  Location: ENDOSCOPY [facility];  Service:    • COLONOSCOPY WITH TATTOOING  10/28/2015    Procedure: COLONOSCOPY WITH TATTOOING;  Surgeon: Ruben Colon M.D.;  Location: ENDOSCOPY [facility];  Service:    • GYN SURGERY  2003    hysteroscoopy   • GYN SURGERY  1982    tubal ligation       CURRENT MEDICATIONS    I have reviewed the nurses notes and/or the list brought with the patient.    ALLERGIES  Allergies   Allergen Reactions   • Nkda [No Known Drug Allergy]        REVIEW OF SYSTEMS  See HPI for further details. Review of systems as above, otherwise all other systems are negative.     PHYSICAL EXAM  VITAL SIGNS: /74   Pulse 70   Temp 36.9 °C (98.4 °F) (Temporal)   Resp 16   Ht 1.702 m (5' 7\")   Wt 84.9 kg (187 lb 2.7 oz)   LMP 06/02/2008   SpO2 96%   BMI 29.32 kg/m²     Constitutional: Well appearing patient in no acute distress.  Not toxic, nor ill in appearance.  HENT: Mucus membranes moist.  Oropharynx is clear.  Eyes: Pupils equally round.  No scleral icterus.   Neck: Full " nontender range of motion.  Lymphatic: No cervical lymphadenopathy noted.   Cardiovascular: Regular heart rate and rhythm.  No murmurs, rubs, nor gallop appreciated.   Thorax & Lungs: Chest is nontender.  Lungs are clear to auscultation with good air movement bilaterally.  No wheeze, rhonchi, nor rales.   Abdomen: Soft, with no tenderness, rebound nor guarding.  No mass, pulsatile mass, nor hepatosplenomegaly appreciated.  Skin: No purpura nor petechia noted.  See below  Extremities/Musculoskeletal: The left knee and leg are quite swollen tender to touch.  Quite warm.  Suggestion of effusion.  The wound appears to be healing well, I cannot express any drainage.  She is tenderness with range of motion.  Neurologic: Alert & oriented.  Strength and sensation is intact all around.   Psychiatric: Normal affect appropriate for the clinical situation.  LABS  Labs Reviewed   CBC WITH DIFFERENTIAL - Abnormal; Notable for the following components:       Result Value    WBC 3.6 (*)     RBC 2.89 (*)     Hemoglobin 7.6 (*)     Hematocrit 25.1 (*)     MCH 26.3 (*)     MCHC 30.3 (*)     Platelet Count 138 (*)     Lymphocytes 18.20 (*)     Lymphs (Absolute) 0.65 (*)     All other components within normal limits   COMP METABOLIC PANEL - Abnormal; Notable for the following components:    Sodium 134 (*)     Glucose 132 (*)     Calcium 8.3 (*)     Albumin 3.1 (*)     All other components within normal limits   CRP QUANTITIVE (NON-CARDIAC) - Abnormal; Notable for the following components:    Stat C-Reactive Protein 2.68 (*)     All other components within normal limits   ESTIMATED GFR   COVID/SARS COV-2         RADIOLOGY/PROCEDURES  I have reviewed the patient's film interpretations myself, and they are read out by the radiologist as:   DX-KNEE COMPLETE 4+ LEFT   Final Result      1.  Large knee joint effusion   2.  Total knee arthroplasty hardware in place      US-EXTREMITY VENOUS LOWER UNILAT LEFT   Final Result        .    MEDICAL  RECORD  I have reviewed patient's medical record and pertinent results are listed above.    COURSE & MEDICAL DECISION MAKING  I have reviewed any medical record information, laboratory studies and radiographic results as noted above.  This patient presents with ongoing pain and swelling which sounds to be worse.  I did go ahead and repeat a sonogram but she is been off of the Eliquis for a few days.  There is no evidence of DVT.  Her laboratories as noted above showed an elevation of her C-reactive protein she is a persistent leukopenia, persistent anemia.  I did discuss the patient's case with Dr. Jon who is seen the patient.  He would like to put her in the hospital.  He like her to be n.p.o. after midnight, he is going to discuss with his partners the operative plan of care for tomorrow.  He also asked for her to not be put on antibiotics, they will get cultures intraoperatively.  Case discussed with the [facility] hospitalist, they will be seeing her.  Patient also is agreeable to the plan.    FINAL IMPRESSION  1. Knee swelling    2.  Anticoagulated but noncompliant       This dictation was created using voice recognition software.    Electronically signed by: Keegan Harvey M.D., 9/13/2020 11:24 PM

## 2020-09-14 NOTE — ASSESSMENT & PLAN NOTE
>>ASSESSMENT AND PLAN FOR CIRRHOSIS (HCC) WRITTEN ON 9/14/2020 12:17 AM BY CAMRON WISDOM M.D.    With pancytopenia

## 2020-09-14 NOTE — RESPIRATORY CARE
" COPD EDUCATION by COPD CLINICAL EDUCATOR  9/14/2020 at 1:41 PM by Sun Jones, RRT     Patient reviewed by COPD education team. Patient has a PFT on file from 2/2/2020 that states \"normal pulmonary function with no response to bronchodilators\". Smoking Cessation Intervention and education completed, 7 minutes spent on smoking cessation education with patient    Provided smoking cessation packet with \"Tips to Quit\" and flyer for \"Free Smoking Cessation Classes\".     "

## 2020-09-14 NOTE — ED NOTES
Med rec complete via interview with pt at bedside. Allergies reviewed. Pt denies antibiotic use in past 14 days.    Home pharmacy [facility]

## 2020-09-14 NOTE — ASSESSMENT & PLAN NOTE
A1C 5.4% a month prior.   - okay to continue metformin  - sensitive sliding scale  - DM diet and hypoglycemia protocol

## 2020-09-14 NOTE — CARE PLAN
Problem: Communication  Goal: The ability to communicate needs accurately and effectively will improve  Outcome: PROGRESSING AS EXPECTED  Note: Report given bedside. Pt updated on plan of care. Pt verbalizes understanding. Pt encouraged to voice needs and concerns. Communication board in use. Call light within reach. Pt calls appropriately. Hourly rounding in place. Pt has no needs or concerns at this time.       Problem: Knowledge Deficit  Goal: Knowledge of the prescribed therapeutic regimen will improve  Outcome: PROGRESSING AS EXPECTED  Note: Reviewed POC including safety, mobility, pain management, medication administration, DVT prophylaxis, and discharge. Call light within reach. Pt calls appropriately. Hourly rounding in place. Pt has no needs or concerns at this time.

## 2020-09-14 NOTE — ED TRIAGE NOTES
Chief Complaint   Patient presents with   • Post-Op Complications     L TKA - Dr. Faustin: 8/24/2020   • Wound Check     L knee, purulent drainage.     Patient to triage via wheelchair, patient A&O x4.  Patient reports total knee arthroplasty with Dr. Faustin on 08/24/2020, symptoms x2 weeks.     Explained wait time and triage process to pt. Pt placed back in lobby, told to notify ED tech or triage RN of any changes, verbalized understanding.

## 2020-09-14 NOTE — ASSESSMENT & PLAN NOTE
Recent left knee arthroplasty on 8/24 with Dr. Faustin. WBAT, per ortho.   - hold off on abx now as she afebrile and VSS  - ortho following, appreciate recs  - s/p arthrocentesis today; follow up cultures  - pain control  - PT/OT eval pending

## 2020-09-14 NOTE — PROGRESS NOTES
Hospital Medicine Daily Progress Note    Date of Service  9/14/2020    Chief Complaint  60 y.o. female admitted 9/13/2020 with knee pain and subjective fevers.     Hospital Course    61 yo F with recent left total knee replacement done by Dr. Faustin last month who presented with worsening knee pain. She endorses fevers at home and drainage from the left knee.        Interval Problem Update  Still with significant left knee pain. Ortho at the bedside, aspiration attempted with only a small amount of old blood withdrawn.     Consultants/Specialty  Orthopedic surgery    Code Status  Full Code    Disposition  Pending PT/OT eval. Home in 2-3 days?    Review of Systems  Review of Systems   Constitutional: Positive for fever and malaise/fatigue. Negative for chills.   Respiratory: Negative for cough and shortness of breath.    Cardiovascular: Negative for chest pain, palpitations and leg swelling.   Gastrointestinal: Negative for abdominal pain, nausea and vomiting.   Genitourinary: Negative for dysuria.   Musculoskeletal: Positive for joint pain. Negative for falls.   Skin: Negative for rash.   Neurological: Negative for dizziness, loss of consciousness and headaches.   Psychiatric/Behavioral: The patient is nervous/anxious.    All other systems reviewed and are negative.       Physical Exam  Temp:  [36.3 °C (97.4 °F)-36.9 °C (98.4 °F)] 36.7 °C (98.1 °F)  Pulse:  [60-93] 93  Resp:  [16-17] 17  BP: (117-162)/() 162/97  SpO2:  [91 %-98 %] 92 %    Physical Exam  Vitals signs reviewed.   Constitutional:       General: She is not in acute distress.     Appearance: She is not diaphoretic.   HENT:      Head: Normocephalic.      Mouth/Throat:      Mouth: Mucous membranes are moist.   Eyes:      General: No scleral icterus.     Extraocular Movements: Extraocular movements intact.      Pupils: Pupils are equal, round, and reactive to light.   Neck:      Musculoskeletal: Normal range of motion. No muscular tenderness.    Cardiovascular:      Rate and Rhythm: Normal rate and regular rhythm.      Pulses: Normal pulses.   Pulmonary:      Effort: Pulmonary effort is normal. No respiratory distress.      Breath sounds: No wheezing or rales.   Abdominal:      General: There is no distension.      Palpations: Abdomen is soft.      Tenderness: There is no abdominal tenderness. There is no guarding.   Musculoskeletal:         General: Swelling and tenderness present.   Skin:     General: Skin is dry.      Comments: Old vertical incision on right knee. Left knee with vertical incision with swelling but no significant erythema.    Neurological:      General: No focal deficit present.      Mental Status: She is alert and oriented to person, place, and time.   Psychiatric:         Mood and Affect: Mood normal.         Speech: Speech normal.         Behavior: Behavior normal. Behavior is cooperative.         Fluids  No intake or output data in the 24 hours ending 09/14/20 0904    Laboratory  Recent Labs     09/13/20  2130   WBC 3.6*   RBC 2.89*   HEMOGLOBIN 7.6*   HEMATOCRIT 25.1*   MCV 86.9   MCH 26.3*   MCHC 30.3*   RDW 49.2   PLATELETCT 138*   MPV 9.1     Recent Labs     09/13/20  2130   SODIUM 134*   POTASSIUM 4.0   CHLORIDE 102   CO2 22   GLUCOSE 132*   BUN 10   CREATININE 0.79   CALCIUM 8.3*                   Imaging  DX-KNEE COMPLETE 4+ LEFT   Final Result      1.  Large knee joint effusion   2.  Total knee arthroplasty hardware in place      US-EXTREMITY VENOUS LOWER UNILAT LEFT   Final Result           Assessment/Plan  * Knee effusion, left  Assessment & Plan  Recent left knee arthroplasty on 8/24 with Dr. Faustin. WBAT, per ortho.   - hold off on abx now as she afebrile and VSS  - ortho following, appreciate recs  - s/p arthrocentesis today; follow up cultures  - pain control  - PT/OT eval pending    Acquired circulating anticoagulants (HCC)- (present on admission)  Assessment & Plan  - eliquis on hold for possible intervention  -  resume when ok'd by surgery    Hypertension- (present on admission)  Assessment & Plan  BP intermittently elevated.   - continue home dronedarone, felodipine and losartan  - vasotec IV PRN     Controlled type 2 diabetes mellitus with hyperglycemia, without long-term current use of insulin (HCC)- (present on admission)  Assessment & Plan  A1C 5.4% a month prior.   - okay to continue metformin  - sensitive sliding scale  - DM diet and hypoglycemia protocol    Primary osteoarthritis of left knee- (present on admission)  Assessment & Plan  S/p bilateral knee replacements    Cirrhosis (HCC)- (present on admission)  Assessment & Plan  With pancytopenia       VTE prophylaxis: heparin.

## 2020-09-14 NOTE — CONSULTS
Orthopaedic Surgery Consult Note:    Jhon Jon M.D.  Date & Time note created:    9/14/2020   2:12 AM     Referring MD:  Dr. Mejia    Patient ID:   Name:             April Alicia   YOB: 1960  Age:                 60 y.o.  female   MRN:               [id number]                                                             Reason for Consult:      Left knee pain    History of Present Illness:    Pat is a very pleasant 60-year-old female now 3 to 4 weeks status post a left total knee done by Dr. Faustin.  Patient notes over the last 1 week she has been having intermittent pain over her left knee it has gotten more swollen.  She has seen Dr. Faustin at [facility]-2 week postop appointment and she was doing well.  However she now notes she has been having some drainage.  She denies any fevers or chills, with the exception of getting them at night.    Review of Systems:      Constitutional: Denies fevers, Denies weight changes  Eyes: Denies changes in vision, no eye pain  Ears/Nose/Throat/Mouth: Denies nasal congestion or sore throat   Cardiovascular: Denies chest pain   Respiratory: Denies shortness of breath , Denies cough  Gastrointestinal/Hepatic: Denies abdominal pain, nausea, vomiting, diarrhea, constipation or GI bleeding   Genitourinary: Denies dysuria or frequency  Musculoskeletal/Rheum: Left knee pain  Skin: Denies rash  Neurological: Denies headache, confusion, memory loss or focal weakness/parasthesias  Psychiatric: denies mood disorder   Endocrine: Rica thyroid problems  Heme/Oncology/Lymph Nodes: Denies enlarged lymph nodes, denies brusing or known bleeding disorder  All other systems were reviewed and are negative (AMA/CMS criteria)                Past Medical History:   Past Medical History:   Diagnosis Date   • Arrhythmia    • Arthritis     OA in knees   • ASTHMA    • Blood clotting disorder (HCC) 2018    right leg blood clot   • Dental disorder     upper and lower dentures    • Diabetes    • Emphysema of lung (HCC)    • Heart abnormality     leakage in left valve   • Heart burn     left knee   • Heart valve disease    • Hypertension    • Infection 9/4/2017   • Infection 2018    Right knee after total knee   • Liver disease    • Pneumonia 02/2020   • Seizure disorder (HCC)      Active Hospital Problems    Diagnosis   • Acquired circulating anticoagulants (HCC) [D68.318]     Priority: Medium   • Hypertension [I10]     Priority: Low   • Controlled type 2 diabetes mellitus with hyperglycemia, without long-term current use of insulin (HCC) [E11.65]     Priority: Low   • Knee effusion, left [M25.462]   • Primary osteoarthritis of left knee [M17.12]   • Cirrhosis (HCC) [K74.60]       Past Surgical History:  Past Surgical History:   Procedure Laterality Date   • PB TOTAL KNEE ARTHROPLASTY Left 8/24/2020    Procedure: ARTHROPLASTY, KNEE, TOTAL;  Surgeon: Víctor Faustin M.D.;  Location: SURGERY [facility];  Service: Orthopedics   • KNEE ARTHROPLASTY TOTAL Right 2018   • IRRIGATION & DEBRIDEMENT ORTHO Right 9/3/2017    Procedure: IRRIGATION & DEBRIDEMENT ORTHO, POLY EXCHANGE;  Surgeon: Jerome Russell M.D.;  Location: SURGERY [facility];  Service: Orthopedics   • COLONOSCOPY WITH CLIPPING  10/28/2015    Procedure: COLONOSCOPY WITH CLIPPING;  Surgeon: Ruben Colon M.D.;  Location: [facility];  Service:    • COLONOSCOPY WITH SCLEROTHERAPY  10/28/2015    Procedure: COLONOSCOPY WITH SCLEROTHERAPY;  Surgeon: Ruben Colon M.D.;  Location: [facility];  Service:    • COLONOSCOPY WITH TATTOOING  10/28/2015    Procedure: COLONOSCOPY WITH TATTOOING;  Surgeon: Ruben Colon M.D.;  Location: ENDOSCOPY [facility];  Service:    • GYN SURGERY  2003    hysteroscoopy   • GYN SURGERY  1982    tubal ligation       Hospital Medications:    Current Facility-Administered Medications:   •  albuterol inhaler 2 Puff, 2 Puff, Inhalation, Q6HRS PRN, Pablo B.  DENNIS Panchal  •  felodipine (PLENDIL) tablet 5 mg, 5 mg, Oral, Q EVENING, Pablo Panchal M.D.  •  gabapentin (NEURONTIN) capsule 400 mg, 400 mg, Oral, BID, Pablo Panchal M.D.  •  losartan (COZAAR) tablet 100 mg, 100 mg, Oral, DAILY, Pablo Panchal M.D.  •  metFORMIN ER (GLUCOPHAGE XR) tablet 500 mg, 500 mg, Oral, Q EVENING, Pablo Panchal M.D.  •  dronedarone (MULTAQ) tablet 400 mg, 400 mg, Oral, BID WITH MEALS, Pablo Panchal M.D.  •  oxyCODONE immediate-release (ROXICODONE) tablet 5 mg, 5 mg, Oral, Q4HRS PRN, Pablo Panchal M.D., 5 mg at 09/14/20 0041  •  glycopyrrolate (Seebri) 15.6 mcg inhalation capsule 1 Cap, 1 Cap, Inhalation, BID (RT), Pablo Panchal M.D.  •  omeprazole (PRILOSEC) capsule 40 mg, 40 mg, Oral, DAILY, Pablo Panchal M.D.  •  senna-docusate (PERICOLACE or SENOKOT S) 8.6-50 MG per tablet 2 Tab, 2 Tab, Oral, BID **AND** polyethylene glycol/lytes (MIRALAX) PACKET 1 Packet, 1 Packet, Oral, QDAY PRN **AND** magnesium hydroxide (MILK OF MAGNESIA) suspension 30 mL, 30 mL, Oral, QDAY PRN **AND** bisacodyl (DULCOLAX) suppository 10 mg, 10 mg, Rectal, QDAY PRN, Pablo Panchal M.D.  •  heparin injection 5,000 Units, 5,000 Units, Subcutaneous, Q12HRS, Pablo Panchal M.D.  •  acetaminophen (TYLENOL) tablet 650 mg, 650 mg, Oral, Q6HRS PRN, Pablo Panchal M.D., 650 mg at 09/14/20 0041  •  enalaprilat (VASOTEC) injection 1.25 mg, 1.25 mg, Intravenous, Q6HRS PRN, Pablo Panchal M.D.  •  ondansetron (ZOFRAN) syringe/vial injection 4 mg, 4 mg, Intravenous, Q4HRS PRN, Pablo Panchal M.D.  •  ondansetron (ZOFRAN ODT) dispertab 4 mg, 4 mg, Oral, Q4HRS PRN, Pablo Panchal M.D.  •  promethazine (PHENERGAN) tablet 12.5-25 mg, 12.5-25 mg, Oral, Q4HRS PRN, Pablo Panchal M.D.  •  promethazine (PHENERGAN) suppository 12.5-25 mg, 12.5-25 mg, Rectal, Q4HRS PRN, Pablo Panchal M.D.  •  prochlorperazine (COMPAZINE) injection 5-10 mg, 5-10 mg, Intravenous, Q4HRS PRN, Pablo Panchal M.D.  •   insulin regular (HumuLIN R,NovoLIN R) injection, 1-6 Units, Subcutaneous, Q6HRS **AND** POC Blood Glucose, , , Q6H **AND** NOTIFY MD and PharmD, , , Once **AND** glucose 4 g chewable tablet 16 g, 16 g, Oral, Q15 MIN PRN **AND** dextrose 50% (D50W) injection 50 mL, 50 mL, Intravenous, Q15 MIN PRN, Pablo Panchal M.D.    Current Outpatient Medications:  Medications Prior to Admission   Medication Sig Dispense Refill Last Dose   • Diclofenac Sodium (VOLTAREN) 1 % Gel Apply  to skin as directed every 7 days.   >5 days at unknown   • lidocaine (LIDODERM) 5 % Patch Apply 1 Patch to skin as directed every 24 hours. Apply 1 patch to feet or wrist q 24 h prn   >5 days at unknown   • ondansetron (ZOFRAN ODT) 4 MG TABLET DISPERSIBLE Take 4 mg by mouth every 6 hours as needed for Nausea.   9/13/2020 at 1100   • calcium carbonate (OS-DAILY 500) 1250 (500 Ca) MG Tab Take 500 mg by mouth every day.   9/13/2020 at 0800   • pantoprazole (PROTONIX) 40 MG Tablet Delayed Response Take 40 mg by mouth every day.   9/13/2020 at 0800   • Prenatal Vit-Fe Fumarate-FA (PRENATAL VITAMIN WITH FE/FA) 27-0.8 MG Tab    9/13/2020 at 0800   • MULTAQ 400 MG Tab Take 400 mg by mouth 2 times a day, with meals.   9/13/2020 at 0800   • felodipine (PLENDIL) 5 MG TABLET SR 24 HR Take 5 mg by mouth every evening.   9/12/2020 at 2000   • gabapentin (NEURONTIN) 400 MG Cap Take 400 mg by mouth 2 times a day.   9/13/2020 at 0800   • metFORMIN ER (GLUCOPHAGE XR) 500 MG TABLET SR 24 HR Take 500 mg by mouth every evening.   9/12/2020 at 2000   • potassium chloride ER (KLOR-CON) 10 MEQ tablet Take 10 mEq by mouth every day.   9/13/2020 at 0800   • tiotropium (SPIRIVA HANDIHALER) 18 MCG Cap Inhale 18 mcg by mouth every day.   9/13/2020 at 0800   • ascorbic acid (ASCORBIC ACID) 500 MG Tab Take 500 mg by mouth every day.   9/13/2020 at 0800   • apixaban (ELIQUIS) 5mg Tab Take 5 mg by mouth 2 Times a Day.   9/12/2020 at 0800   • oxyCODONE immediate-release (ROXICODONE)  5 MG Tab Take 5 mg by mouth every four hours as needed for Severe Pain.   9/13/2020 at 0600   • magnesium oxide (MAG-OX) 400 MG Tab Take 400 mg by mouth every day.   9/12/2020 at 2000   • docusate sodium (COLACE) 100 MG Cap Take 100 mg by mouth 2 times a day.   9/13/2020 at 0800   • losartan (COZAAR) 100 MG Tab Take 100 mg by mouth every day.   9/13/2020 at 0800   • albuterol (VENTOLIN OR PROVENTIL) 108 (90 BASE) MCG/ACT Aero Soln inhalation aerosol Inhale 2 Puffs by mouth every 6 hours as needed for Shortness of Breath.   9/12/2020 at 2030       Medication Allergy:  Allergies   Allergen Reactions   • Nkda [No Known Drug Allergy]        Family History:  No family history on file.    Social History:  Social History     Socioeconomic History   • Marital status: Single     Spouse name: Not on file   • Number of children: Not on file   • Years of education: Not on file   • Highest education level: Not on file   Occupational History   • Not on file   Social Needs   • Financial resource strain: Not hard at all   • Food insecurity     Worry: Never true     Inability: Never true   • Transportation needs     Medical: No     Non-medical: No   Tobacco Use   • Smoking status: Current Some Day Smoker     Types: Cigarettes     Last attempt to quit: 12/29/2016     Years since quitting: 3.7   • Smokeless tobacco: Never Used   • Tobacco comment: a few a day when I can   Substance and Sexual Activity   • Alcohol use: No   • Drug use: No   • Sexual activity: Not on file   Lifestyle   • Physical activity     Days per week: Not on file     Minutes per session: Not on file   • Stress: Not on file   Relationships   • Social connections     Talks on phone: Not on file     Gets together: Not on file     Attends [employer] service: Not on file     Active member of club or organization: Not on file     Attends meetings of clubs or organizations: Not on file     Relationship status: Not on file   • Intimate partner violence     Fear of current  "or ex partner: Not on file     Emotionally abused: Not on file     Physically abused: Not on file     Forced sexual activity: Not on file   Other Topics Concern   • Not on file   Social History Narrative   • Not on file         Physical Exam:  Vitals/ General Appearance:   Weight/BMI: Body mass index is 29.32 kg/m².  /66   Pulse 68   Temp 36.9 °C (98.4 °F) (Temporal)   Resp 16   Ht 1.702 m (5' 7\")   Wt 84.9 kg (187 lb 2.7 oz)   SpO2 95%   Vitals:    09/13/20 1940 09/13/20 1951 09/13/20 2136 09/14/20 0112   BP: 142/110  140/74 133/66   Pulse: 78  70 68   Resp: 16      Temp: 36.9 °C (98.4 °F)      TempSrc: Temporal      SpO2: 95%  96% 95%   Weight:  84.9 kg (187 lb 2.7 oz)     Height:  1.702 m (5' 7\")         Constitutional:   Well developed, Well nourished, No acute distress  HENMT:  Normocephalic, Atraumatic, Oropharynx moist mucous membranes, No oral exudates, Nose normal.  No thyromegaly.  Eyes:  EOMI, Conjunctiva normal, No discharge.  Neck:  Normal range of motion, No cervical tenderness,  no JVD.  Cardiovascular:  Regular rate and rhythm  Lungs:  Normal breathing  Abdomen: Soft, non-tender, non-distended.  Skin: Warm, Dry, No erythema, No rash, no induration.  Neurologic: Alert & oriented x 3, No focal deficits noted, cranial nerves II through X are grossly intact.  Psychiatric: Affect normal, Judgment normal, Mood normal.  Musculoskeletal:   Left knee: Mild swelling is noted, there is no gross purulence or drainage coming from the incision site.  There is no significant cellulitis.  Her knee is warm.  EHL FHL ~tibialis anterior function is intact.  Sensations intact over the medial lateral and first dorsal webspace.  No significant pain with passive range of motion of her left knee.    Lab Data Review:  Recent Results (from the past 24 hour(s))   CBC WITH DIFFERENTIAL    Collection Time: 09/13/20  9:30 PM   Result Value Ref Range    WBC 3.6 (L) 4.8 - 10.8 K/uL    RBC 2.89 (L) 4.20 - 5.40 M/uL    " Hemoglobin 7.6 (L) 12.0 - 16.0 g/dL    Hematocrit 25.1 (L) 37.0 - 47.0 %    MCV 86.9 81.4 - 97.8 fL    MCH 26.3 (L) 27.0 - 33.0 pg    MCHC 30.3 (L) 33.6 - 35.0 g/dL    RDW 49.2 35.9 - 50.0 fL    Platelet Count 138 (L) 164 - 446 K/uL    MPV 9.1 9.0 - 12.9 fL    Neutrophils-Polys 67.80 44.00 - 72.00 %    Lymphocytes 18.20 (L) 22.00 - 41.00 %    Monocytes 8.70 0.00 - 13.40 %    Eosinophils 3.90 0.00 - 6.90 %    Basophils 1.10 0.00 - 1.80 %    Immature Granulocytes 0.30 0.00 - 0.90 %    Nucleated RBC 0.00 /100 WBC    Neutrophils (Absolute) 2.42 2.00 - 7.15 K/uL    Lymphs (Absolute) 0.65 (L) 1.00 - 4.80 K/uL    Monos (Absolute) 0.31 0.00 - 0.85 K/uL    Eos (Absolute) 0.14 0.00 - 0.51 K/uL    Baso (Absolute) 0.04 0.00 - 0.12 K/uL    Immature Granulocytes (abs) 0.01 0.00 - 0.11 K/uL    NRBC (Absolute) 0.00 K/uL   COMP METABOLIC PANEL    Collection Time: 09/13/20  9:30 PM   Result Value Ref Range    Sodium 134 (L) 135 - 145 mmol/L    Potassium 4.0 3.6 - 5.5 mmol/L    Chloride 102 96 - 112 mmol/L    Co2 22 20 - 33 mmol/L    Anion Gap 10.0 7.0 - 16.0    Glucose 132 (H) 65 - 99 mg/dL    Bun 10 8 - 22 mg/dL    Creatinine 0.79 0.50 - 1.40 mg/dL    Calcium 8.3 (L) 8.5 - 10.5 mg/dL    AST(SGOT) 15 12 - 45 U/L    ALT(SGPT) 7 2 - 50 U/L    Alkaline Phosphatase 98 30 - 99 U/L    Total Bilirubin 0.9 0.1 - 1.5 mg/dL    Albumin 3.1 (L) 3.2 - 4.9 g/dL    Total Protein 6.6 6.0 - 8.2 g/dL    Globulin 3.5 1.9 - 3.5 g/dL    A-G Ratio 0.9 g/dL   CRP QUANTITIVE (NON-CARDIAC)    Collection Time: 09/13/20  9:30 PM   Result Value Ref Range    Stat C-Reactive Protein 2.68 (H) 0.00 - 0.75 mg/dL   ESTIMATED GFR    Collection Time: 09/13/20  9:30 PM   Result Value Ref Range    GFR If African American >60 >60 mL/min/1.73 m 2    GFR If Non African American >60 >60 mL/min/1.73 m 2   Routine (COVID/SARS COV-2 In-House PCR up to 24 hours)    Collection Time: 09/13/20 11:25 PM    Specimen: Nasopharyngeal; Respirate   Result Value Ref Range    COVID Order  Status Received    SARS-CoV-2, PCR (In-House)    Collection Time: 09/13/20 11:25 PM   Result Value Ref Range    SARS-CoV-2 Source NP Swab        Imaging:   DX-KNEE COMPLETE 4+ LEFT   Final Result      1.  Large knee joint effusion   2.  Total knee arthroplasty hardware in place      US-EXTREMITY VENOUS LOWER UNILAT LEFT   Final Result          Assessment: 60-year-old female with a left total knee concerning for contusion versus infection, versus swelling status post procedure    Plan:     The patient has presented to the hospital numerous of times secondary to her left knee pain.  As she has difficulty walking I think would be reasonable to keep her admitted secondary to her elevated CRP.    Patient will be admitted to the hospitalist service.  I will discuss the case with her primary surgeon, and a plan will be determined.  Patient to be n.p.o. at midnight in the event anything is necessary.  Hold off on antibiotics at this time until definitive cultures are obtained.          Jhon Jon M.D.  [facility]

## 2020-09-14 NOTE — PROGRESS NOTES
60yoF with recent left TKA.  Readmitted to hospitalist service with knee pain and subjective fevers.    S: Complaining of left knee pain    O:    Vitals:    09/14/20 0800   BP: (!) 162/97   Pulse: 93   Resp: 17   Temp: 36.7 °C (98.1 °F)   SpO2: 92%     Exam:  General-NAD, alert and following commands  LLE-knee swollen and ecchymotic but no wound dehiscence, extensor mechanism functionally intact. Limited knee flexion, NVI distally    Procedures:  Sterile arthrocentesis of knee performed supralaterally and minimal dark red blood was removed, consistent with hemarthrosis about 0.5cc and was sent to lab for culture given insufficient amount of fluid for cell count.    A: 60yoF with recent left TKA.  Readmitted to hospitalist service with knee pain and subjective fevers.  Arthrocentesis not consistent with infection, more consistent with postop hemarthrosis.      Recs:  --okay for WBAT LLE  --knee ROM and mobilization with PT  --fu fluid culture from knee, okay to hold off on abx therapy at this time

## 2020-09-14 NOTE — PROGRESS NOTES
"Ortho    Pt well known to me.  3 weeks post left TKA.  Came to ER again for pain in left knee.  No fevers.  States ERP told her a few days ago knee looked infected and to come back in 2 days.  Presented yesterday and admitted to hospitalist.  No fevers, no recent drainage, no pain with limited motion.    Blood pressure (!) 162/97, pulse 93, temperature 36.7 °C (98.1 °F), temperature source Temporal, resp. rate 17, height 1.702 m (5' 7\"), weight 84.9 kg (187 lb 2.7 oz), last menstrual period 06/02/2008, SpO2 92 %, not currently breastfeeding.    Neurovascular intact  Wound Clean and Dry  Moderate effusion.  Aspirated by Dr. Gray with minimal fluid obtained consistent with an organizing hematoma.  Gram stain shows no organisms, rare WBCs.    Recent Labs     09/13/20  2130   WBC 3.6*   RBC 2.89*   HEMOGLOBIN 7.6*   HEMATOCRIT 25.1*   MCV 86.9   MCH 26.3*   MCHC 30.3*   RDW 49.2   PLATELETCT 138*   MPV 9.1     Recent Labs     09/13/20  2130   SODIUM 134*   POTASSIUM 4.0   CHLORIDE 102   CO2 22   GLUCOSE 132*   BUN 10   CREATININE 0.79   CALCIUM 8.3*       Plan: No sign of infection.  No drainage, normal WBCs, poor motion.  No need to be in hospital from Ortho standpoint.  Should keep previously scheduled appointment with me.  I will keep an eye on her cultures.  Needs to work more aggressively on regaining motion.  "

## 2020-09-15 ENCOUNTER — PATIENT OUTREACH (OUTPATIENT)
Dept: HEALTH INFORMATION MANAGEMENT | Facility: OTHER | Age: 60
End: 2020-09-15

## 2020-09-15 VITALS
SYSTOLIC BLOOD PRESSURE: 145 MMHG | WEIGHT: 187.17 LBS | TEMPERATURE: 98.2 F | DIASTOLIC BLOOD PRESSURE: 80 MMHG | HEART RATE: 67 BPM | OXYGEN SATURATION: 93 % | BODY MASS INDEX: 29.38 KG/M2 | HEIGHT: 67 IN | RESPIRATION RATE: 16 BRPM

## 2020-09-15 LAB
ANION GAP SERPL CALC-SCNC: 11 MMOL/L (ref 7–16)
BASOPHILS # BLD AUTO: 1.1 % (ref 0–1.8)
BASOPHILS # BLD: 0.03 K/UL (ref 0–0.12)
BUN SERPL-MCNC: 7 MG/DL (ref 8–22)
CALCIUM SERPL-MCNC: 8.3 MG/DL (ref 8.5–10.5)
CHLORIDE SERPL-SCNC: 107 MMOL/L (ref 96–112)
CO2 SERPL-SCNC: 23 MMOL/L (ref 20–33)
CREAT SERPL-MCNC: 0.51 MG/DL (ref 0.5–1.4)
EOSINOPHIL # BLD AUTO: 0.13 K/UL (ref 0–0.51)
EOSINOPHIL NFR BLD: 4.7 % (ref 0–6.9)
ERYTHROCYTE [DISTWIDTH] IN BLOOD BY AUTOMATED COUNT: 48.6 FL (ref 35.9–50)
GLUCOSE BLD-MCNC: 110 MG/DL (ref 65–99)
GLUCOSE BLD-MCNC: 127 MG/DL (ref 65–99)
GLUCOSE SERPL-MCNC: 121 MG/DL (ref 65–99)
HCT VFR BLD AUTO: 24.9 % (ref 37–47)
HGB BLD-MCNC: 7.6 G/DL (ref 12–16)
IMM GRANULOCYTES # BLD AUTO: 0.01 K/UL (ref 0–0.11)
IMM GRANULOCYTES NFR BLD AUTO: 0.4 % (ref 0–0.9)
LYMPHOCYTES # BLD AUTO: 0.89 K/UL (ref 1–4.8)
LYMPHOCYTES NFR BLD: 32.5 % (ref 22–41)
MCH RBC QN AUTO: 26 PG (ref 27–33)
MCHC RBC AUTO-ENTMCNC: 30.5 G/DL (ref 33.6–35)
MCV RBC AUTO: 85.3 FL (ref 81.4–97.8)
MONOCYTES # BLD AUTO: 0.31 K/UL (ref 0–0.85)
MONOCYTES NFR BLD AUTO: 11.3 % (ref 0–13.4)
NEUTROPHILS # BLD AUTO: 1.37 K/UL (ref 2–7.15)
NEUTROPHILS NFR BLD: 50 % (ref 44–72)
NRBC # BLD AUTO: 0 K/UL
NRBC BLD-RTO: 0 /100 WBC
PLATELET # BLD AUTO: 143 K/UL (ref 164–446)
PMV BLD AUTO: 9.7 FL (ref 9–12.9)
POTASSIUM SERPL-SCNC: 3.9 MMOL/L (ref 3.6–5.5)
RBC # BLD AUTO: 2.92 M/UL (ref 4.2–5.4)
SODIUM SERPL-SCNC: 141 MMOL/L (ref 135–145)
WBC # BLD AUTO: 2.7 K/UL (ref 4.8–10.8)

## 2020-09-15 PROCEDURE — 700102 HCHG RX REV CODE 250 W/ 637 OVERRIDE(OP): Performed by: HOSPITALIST

## 2020-09-15 PROCEDURE — 85025 COMPLETE CBC W/AUTO DIFF WBC: CPT

## 2020-09-15 PROCEDURE — 97161 PT EVAL LOW COMPLEX 20 MIN: CPT

## 2020-09-15 PROCEDURE — 82962 GLUCOSE BLOOD TEST: CPT

## 2020-09-15 PROCEDURE — 99239 HOSP IP/OBS DSCHRG MGMT >30: CPT | Performed by: HOSPITALIST

## 2020-09-15 PROCEDURE — 700111 HCHG RX REV CODE 636 W/ 250 OVERRIDE (IP): Performed by: HOSPITALIST

## 2020-09-15 PROCEDURE — 97165 OT EVAL LOW COMPLEX 30 MIN: CPT

## 2020-09-15 PROCEDURE — 80048 BASIC METABOLIC PNL TOTAL CA: CPT

## 2020-09-15 PROCEDURE — 36415 COLL VENOUS BLD VENIPUNCTURE: CPT

## 2020-09-15 PROCEDURE — A9270 NON-COVERED ITEM OR SERVICE: HCPCS | Performed by: HOSPITALIST

## 2020-09-15 RX ADMIN — OXYCODONE HYDROCHLORIDE 5 MG: 5 TABLET ORAL at 13:34

## 2020-09-15 RX ADMIN — OXYCODONE HYDROCHLORIDE 5 MG: 5 TABLET ORAL at 05:32

## 2020-09-15 RX ADMIN — GABAPENTIN 400 MG: 400 CAPSULE ORAL at 12:32

## 2020-09-15 RX ADMIN — LOSARTAN POTASSIUM 100 MG: 50 TABLET, FILM COATED ORAL at 05:32

## 2020-09-15 RX ADMIN — OXYCODONE HYDROCHLORIDE 5 MG: 5 TABLET ORAL at 09:38

## 2020-09-15 RX ADMIN — DOCUSATE SODIUM 50 MG AND SENNOSIDES 8.6 MG 2 TABLET: 8.6; 5 TABLET, FILM COATED ORAL at 05:31

## 2020-09-15 RX ADMIN — HEPARIN SODIUM 5000 UNITS: 5000 INJECTION, SOLUTION INTRAVENOUS; SUBCUTANEOUS at 05:31

## 2020-09-15 RX ADMIN — DRONEDARONE 400 MG: 400 TABLET, FILM COATED ORAL at 06:21

## 2020-09-15 RX ADMIN — GLYCOPYRROLATE 1 CAPSULE: 15.6 CAPSULE RESPIRATORY (INHALATION) at 09:38

## 2020-09-15 RX ADMIN — OMEPRAZOLE 40 MG: 20 CAPSULE, DELAYED RELEASE ORAL at 05:32

## 2020-09-15 RX ADMIN — OXYCODONE HYDROCHLORIDE 5 MG: 5 TABLET ORAL at 01:24

## 2020-09-15 ASSESSMENT — COGNITIVE AND FUNCTIONAL STATUS - GENERAL
HELP NEEDED FOR BATHING: A LITTLE
SUGGESTED CMS G CODE MODIFIER MOBILITY: CH
MOBILITY SCORE: 24
SUGGESTED CMS G CODE MODIFIER DAILY ACTIVITY: CJ
DRESSING REGULAR LOWER BODY CLOTHING: A LITTLE
DAILY ACTIVITIY SCORE: 22

## 2020-09-15 ASSESSMENT — PAIN DESCRIPTION - PAIN TYPE
TYPE: ACUTE PAIN

## 2020-09-15 ASSESSMENT — ACTIVITIES OF DAILY LIVING (ADL): TOILETING: INDEPENDENT

## 2020-09-15 ASSESSMENT — GAIT ASSESSMENTS
DISTANCE (FEET): 300
ASSISTIVE DEVICE: FRONT WHEEL WALKER
GAIT LEVEL OF ASSIST: SUPERVISED

## 2020-09-15 NOTE — CARE PLAN
Problem: Safety  Goal: Will remain free from injury  Outcome: PROGRESSING AS EXPECTED  Note: Safety precautions in place. Bed in lowest and locked position. Call light and personal belongings within reach      Problem: Infection  Goal: Will remain free from infection  Outcome: PROGRESSING AS EXPECTED  Note: Discussed infection prevention. Standard precautions in place

## 2020-09-15 NOTE — DISCHARGE SUMMARY
Discharge Summary    CHIEF COMPLAINT ON ADMISSION  Chief Complaint   Patient presents with   • Post-Op Complications     L TKA - Dr. Faustin: 8/24/2020   • Wound Check     L knee, purulent drainage.       Reason for Admission  Knee Pain     Admission Date  9/13/2020    CODE STATUS  Full Code    HPI & HOSPITAL COURSE  Patient is a 60 year old female who underwent a left total knee replacement on 8/24/2020 with Dr. Vanegas.  She returned to the hospital with complaints of pain swelling and redness at left knee surgical site. She denied fevers.  She was seen by orthopedics and an aspiration of the left knee joint effusion was performed.  It showed no evidence of a bacterial infection and was consistent with post operative hemarthrosis.  She was cleared by PT and will continue using her walker. She will remain on elquis for post operative DVT prophylaxis and follow up with ortho in clinic.  Home bp monitoring and close follow up her chronic pancytopenia (likely from cirrhosis) with her pcp was also recommended and patient agrees to this.       Therefore, she is discharged in fair and stable condition to home with close outpatient follow-up.    The patient met 2-midnight criteria for an inpatient stay at the time of discharge.    Discharge Date  9/15/20    FOLLOW UP ITEMS POST DISCHARGE  Pcp  Ortho    DISCHARGE DIAGNOSES  Principal Problem:    Knee effusion, left POA: Unknown  Active Problems:    Controlled type 2 diabetes mellitus with hyperglycemia, without long-term current use of insulin (HCC) (Chronic) POA: Yes    Hypertension (Chronic) POA: Yes    Acquired circulating anticoagulants (HCC) POA: Yes    Primary osteoarthritis of left knee POA: Yes    Cirrhosis (HCC) (Chronic) POA: Yes  Resolved Problems:    * No resolved hospital problems. *      FOLLOW UP  No future appointments.  Víctor Faustin M.D.  [address]  [address]  [facility] [address]  [phone]    Schedule an appointment as soon as  possible for a visit in 2 weeks  If symptoms worsen, As needed, For suture removal, For wound re-check      MEDICATIONS ON DISCHARGE     Medication List      CONTINUE taking these medications      Instructions   albuterol 108 (90 Base) MCG/ACT Aers inhalation aerosol   Inhale 2 Puffs by mouth every 6 hours as needed for Shortness of Breath.  Dose: 2 Puff     ascorbic acid 500 MG Tabs  Commonly known as: ascorbic acid   Take 500 mg by mouth every day.  Dose: 500 mg     calcium carbonate 1250 (500 Ca) MG Tabs  Commonly known as: OS-DAILY 500   Take 500 mg by mouth every day.  Dose: 500 mg     docusate sodium 100 MG Caps  Commonly known as: COLACE   Take 100 mg by mouth 2 times a day.  Dose: 100 mg     Eliquis 5mg Tabs  Generic drug: apixaban   Take 5 mg by mouth 2 Times a Day.  Dose: 5 mg     felodipine 5 MG Tb24  Commonly known as: PLENDIL   Take 5 mg by mouth every evening.  Dose: 5 mg     gabapentin 400 MG Caps  Commonly known as: NEURONTIN   Take 400 mg by mouth 2 times a day.  Dose: 400 mg     lidocaine 5 % Ptch  Commonly known as: LIDODERM   Apply 1 Patch to skin as directed every 24 hours. Apply 1 patch to feet or wrist q 24 h prn  Dose: 1 Patch     losartan 100 MG Tabs  Commonly known as: COZAAR   Take 100 mg by mouth every day.  Dose: 100 mg     magnesium oxide 400 MG Tabs tablet  Commonly known as: MAG-OX   Take 400 mg by mouth every day.  Dose: 400 mg     metFORMIN  MG Tb24  Commonly known as: GLUCOPHAGE XR   Take 500 mg by mouth every evening.  Dose: 500 mg     Multaq 400 MG Tabs  Generic drug: dronedarone   Take 400 mg by mouth 2 times a day, with meals.  Dose: 400 mg     ondansetron 4 MG Tbdp  Commonly known as: ZOFRAN ODT   Take 4 mg by mouth every 6 hours as needed for Nausea.  Dose: 4 mg     oxyCODONE immediate-release 5 MG Tabs  Commonly known as: ROXICODONE   Take 5 mg by mouth every four hours as needed for Severe Pain.  Dose: 5 mg     pantoprazole 40 MG Tbec  Commonly known as: PROTONIX   Take  40 mg by mouth every day.  Dose: 40 mg     potassium chloride ER 10 MEQ tablet  Commonly known as: KLOR-CON   Take 10 mEq by mouth every day.  Dose: 10 mEq     prenatal vitamin with Fe/FA 27-0.8 MG Tabs      Spiriva HandiHaler 18 MCG Caps  Generic drug: tiotropium   Inhale 18 mcg by mouth every day.  Dose: 18 mcg     Voltaren 1 % Gel  Generic drug: Diclofenac Sodium   Apply  to skin as directed every 7 days.            Allergies  Allergies   Allergen Reactions   • Nkda [No Known Drug Allergy]        DIET  Orders Placed This Encounter   Procedures   • Diet Order Diabetic     Standing Status:   Standing     Number of Occurrences:   1     Order Specific Question:   Diet:     Answer:   Diabetic [3]       ACTIVITY  As tolerated.  Weight bearing as tolerated    CONSULTATIONS  ortho    PROCEDURES  DX-KNEE COMPLETE 4+ LEFT   Final Result      1.  Large knee joint effusion   2.  Total knee arthroplasty hardware in place      US-EXTREMITY VENOUS LOWER UNILAT LEFT   Final Result            LABORATORY  Lab Results   Component Value Date    SODIUM 141 09/15/2020    POTASSIUM 3.9 09/15/2020    CHLORIDE 107 09/15/2020    CO2 23 09/15/2020    GLUCOSE 121 (H) 09/15/2020    BUN 7 (L) 09/15/2020    CREATININE 0.51 09/15/2020    CREATININE 0.9 03/12/2009        Lab Results   Component Value Date    WBC 2.7 (L) 09/15/2020    HEMOGLOBIN 7.6 (L) 09/15/2020    HEMATOCRIT 24.9 (L) 09/15/2020    PLATELETCT 143 (L) 09/15/2020        Total time of the discharge process exceeds  minutes.

## 2020-09-15 NOTE — DISCHARGE INSTRUCTIONS
Joint Injection, Care After  Refer to this sheet in the next few days. These instructions provide you with information on caring for yourself after you have had a joint injection. Your caregiver also may give you more specific instructions. Your treatment has been planned according to current medical practices, but problems sometimes occur. Call your caregiver if you have any problems or questions after your procedure.  After any type of joint injection, it is not uncommon to experience:  · Soreness, swelling, or bruising around the injection site.  · Mild numbness, tingling, or weakness around the injection site caused by the numbing medicine used before or with the injection.  It also is possible to experience the following effects associated with the specific agent after injection:  · Iodine-based contrast agents:  ¨ Allergic reaction (itching, hives, widespread redness, and swelling beyond the injection site).  · Corticosteroids (These effects are rare.):  ¨ Allergic reaction.  ¨ Increased blood sugar levels (If you have diabetes and you notice that your blood sugar levels have increased, notify your caregiver).  ¨ Increased blood pressure levels.  ¨ Mood swings.  · Hyaluronic acid in the use of viscosupplementation.  ¨ Temporary heat or redness.  ¨ Temporary rash and itching.  ¨ Increased fluid accumulation in the injected joint.  These effects all should resolve within a day after your procedure.   HOME CARE INSTRUCTIONS  · Limit yourself to light activity the day of your procedure. Avoid lifting heavy objects, bending, stooping, or twisting.  · Take prescription or over-the-counter pain medication as directed by your caregiver.  · You may apply ice to your injection site to reduce pain and swelling the day of your procedure. Ice may be applied 03-04 times:  ¨ Put ice in a plastic bag.  ¨ Place a towel between your skin and the bag.  ¨ Leave the ice on for no longer than 15-20 minutes each time.  SEEK  IMMEDIATE MEDICAL CARE IF:   · Pain and swelling get worse rather than better or extend beyond the injection site.  · Numbness does not go away.  · Blood or fluid continues to leak from the injection site.  · You have chest pain.  · You have swelling of your face or tongue.  · You have trouble breathing or you become dizzy.  · You develop a fever, chills, or severe tenderness at the injection site that last longer than 1 day.  MAKE SURE YOU:  · Understand these instructions.  · Watch your condition.  · Get help right away if you are not doing well or if you get worse.     This information is not intended to replace advice given to you by your health care provider. Make sure you discuss any questions you have with your health care provider.     Document Released: 08/30/2012 Document Revised: 01/08/2016 Document Reviewed: 08/30/2012  [employer] Interactive Patient Education ©2016 [employer]  Discharge Instructions    Discharged to home by car with relative. Discharged via wheelchair, hospital escort: Yes.  Special equipment needed: Not Applicable    Be sure to schedule a follow-up appointment with your primary care doctor or any specialists as instructed.     Discharge Plan:        I understand that a diet low in cholesterol, fat, and sodium is recommended for good health. Unless I have been given specific instructions below for another diet, I accept this instruction as my diet prescription.   Other diet: Diabetic    Special Instructions: Discharge instructions for the Orthopedic Patient    Follow up with Primary Care Physician within 2 weeks of discharge to home, regarding:  Review of medications and diagnostic testing.  Surveillance for medical complications.  Workup and treatment of osteoporosis, if appropriate.     -Is this a Hip/Knee/Shoulder Joint Replacement patient? Yes   TOTAL KNEE REPLACEMENT, AFTER-CARE GUIDELINES     These instructions provide you with information on caring for yourself and your knee  after surgery. Your health care provider may also give you instructions that are more specific. Your treatment was planned and performed according to current medical practices but problems sometimes occur. Call your health care provider if you have any problems or questions.     WHAT TO EXPECT AFTER THE PROCEDURE   After your procedure, your knee will typically be stiff, sore, and bruised. This will improve over time.     Pain   · Follow your home pain management plan as discussed with your nurse and as directed by your provider.   · It is important to follow any scheduled pain medications for maximal pain relief.   · If prescribed opioid medication, the goal is to use opioids only as needed and to wean off prescription pain medicine as soon as possible.   · Ice can be used for pain control.   · Put ice in a plastic bag.   · Place a towel between your skin and the bag.   · Leave the ice on for 20 minutes, 2-3 times a day at a minimum.   · Most patients are off the pain pills by 3 weeks. If your pain continues to be severe, follow up with your provider.     Infection   Knee joint infections occur in fewer than 2% of patients. The most common causes of infection following total knee replacement surgery are from bacteria that enter the bloodstream during dental procedures, urinary tract infections, or skin infections. These bacteria can lodge around your knee replacement and cause an infection.   · Keep the incision as clean and dry as possible.   · Always wash your hands before touching your incision.   · Avoid dental care for 3 months after surgery. Your provider may recommend taking a dose of antibiotics an hour prior to any dental procedure. After 2 years, most providers recommend antibiotics only before an extensive procedure. Ask your provider what they recommend.   · Signs and symptoms of infection include low-grade fever, redness, pain, swelling and drainage from your incision. Notify your provider IMMEDIATELY  if you develop ANY of these symptoms.     Post op Disturbances   · Bowel Habits - Constipation is extremely common and caused by a combination of anesthesia, lack of mobility, dehydration and pain medicine. Use stool softeners or laxatives if necessary. It is important not to ignore this problem as bowel obstructions can be a serious complication after joint replacement surgery.   · Mood/Energy Level - Many patients experience a lack of energy and endurance for up to 2-3 months after surgery. Some people feel down and can even become depressed. This is likely due to postoperative anemia, change in activity level, lack of sleep, pain medicine and just the emotional reaction to the surgery itself that is a big disruption in a person’s life. This usually passes. If symptoms persist, follow up with your primary care provider.  · Returning to Work - Your provider will give you specific instructions based on your profession. Generally, if you work a sedentary job requiring little standing or walking, most patients may return within 2-6 weeks. Manual labor jobs involving walking, lifting and standing may take 3-4 months. Your provider’s office can provide a release to part-time or light duty work early on in your recovery and progress you to full duty as able.   · Driving - You can begin driving once cleared by your provider, provided you are no longer taking narcotic pain medication or any other medications that impair driving. Discuss the length of time expected with your provider as returning to driving depends on things such as your vehicle, which knee was replaced (right or left), and knee motion, strength and reflexes returning appropriately.   · Avoiding falls - A fall during the first few weeks after surgery can damage your new knee and may result in a need for further surgery.  throw rugs and tack down loose carpeting. Be aware of floor hazards such as pets, small objects or uneven surfaces. Notify your  provider of any falls.   · [employer] Detect[employer] - The sensitivity of metal detectors varies and it is likely that your prosthesis will cause an alarm. Inform the  of your artificial joint.     Diet   · Resume your normal diet as tolerated.   · It is important to achieve a healthy nutritional status by eating a well-balanced diet on a regular basis.   · Your provider may recommend that you take iron and vitamin supplements.   · Continue to drink plenty of fluids.     Shower/Bathing   · You may shower as soon as you get home from the hospital unless otherwise instructed.   · Keep your incision out of water to prevent infection. To keep the incision dry when showering, cover it with a plastic bag or plastic wrap. If your bandage is waterproof, this may not be necessary. o Pat incision dry if it gets wet. Do not rub. Notify your provider.   · Do not submerge in a bath until cleared by your provider. Your staples must be out and the incision completely healed.     Dressing Change: Only change your dressing if directed by your provider.   · Wash hands.   · Open all dressing change materials.   · Remove old dressing and discard.   · Inspect incision for signs of irritation or infection including redness, increase in clear drainage, yellow/green drainage, odor and surrounding skin hot to touch. Notify your provider if present.   ·  the new dressing by one corner and lay over the incision. Be careful not to touch the inside of the dressing that will lay over the incision.   · Secure in place as instructed. Swelling/Bruising   · Swelling is normal after knee replacement and can involve the thigh, knee, calf and foot.   · Swelling can last from 3-6 months.   · To reduce swelling, elevate your leg higher than your heart while reclining. The first week you are home you should elevate your leg an equal amount of time as you are active.   · The swelling is usually worse after you go home since you are  upright for longer periods of time.   · Bruising often does not appear until after you arrive home and can be quite dramatic- appearing purple, black, or green. Bruising is typically not concerning and will subside without any treatment.     Blood Clot Prevention   Your treatment plan includes multiple preventative measures to decrease the risk of blood clots in the legs (DVTs) and the less common, but serious, clots that travel to the lungs (pulmonary emboli). Most patients are at standard risk for them, but people who are at higher risk include those who have had previous clots, a family history of clotting, smoking, diabetes, obesity, advanced age, use estrogen and/or live a sedentary lifestyle.     · Signs of blood clots in legs include - Swelling in thigh, calf or ankle that does not go down with elevation. Pain, heat and tenderness in calf, back of calf or groin area. NOTE: blood clots can occur in either leg.   · Signs of blood clots in lungs include - Sudden increased shortness of breath, sudden onset of chest pain, and localized chest pain with coughing.   · If you experience any of the above symptoms, notify your provider and seek medical attention immediately.   · You received anticoagulant therapy (blood thinners) in the hospital. Continue the prescribed blood-thinning medication at home, as directed by your provider.   · Your risk for developing a clot continues for up to 2-3 months after surgery. Avoid prolonged sitting and dehydration (long air trips and car trips). If you do take a trip during this time, please get up, move around every 1-1.5 hours, and discuss all travel plans with your provider.     Activity   Once home, stay active. The key is not to overdo it. While you can expect some good days and some bad days, you should notice a gradual improvement and a gradual increase in your endurance over the next 6 to 12 months. Exercise is a critical component of recovery, particularly during the  first few weeks after surgery.     · Normal activities of daily living - Expect to resume most within 3 to 6 weeks following surgery. Some pain with activity and at night is common for several weeks after surgery. Walk as much as you like once your doctor gives permission to proceed, but remember that walking is no substitute for the exercises your doctor and physical therapist prescribe. Use a walker, crutches or cane to assist with walking until you can walk smoothly (minimal or no limp) without assistance.   · Physical Therapy Exercises - Follow your home exercise program as instructed by your physical therapist during your hospital stay. Call and set up outpatient physical therapy appointments per your provider’s recommendations. Physical therapy after the hospital stay focuses on increasing your range of motion, strengthening your muscles and improving your gait/walking pattern. Contact your provider for the referral to outpatient physical therapy if you have not yet received this. -   · Riding a stationary bicycle can help maintain muscle tone and keep your knee flexible. Begin stationary bicycling as directed by your physical therapist or provider.   · Sexual Activity - Your provider can tell you when it safe to resume sexual activity.   · Sleeping Positions - You can safely sleep on your back, on either side, or on your stomach.   · Other Activities - Lower impact activities are preferred. Consult your provider if you have specific questions.     When to Call the Doctor   Call the provider if you experience:   · Fever over 100.5° F   · Increased pain, drainage, redness, odor or heat around the incision area   · Shaking chills   · Increased knee pain with activity and rest   · Increased pain in calf, tenderness or redness above or below the knee   · Increased swelling of calf, ankle, foot   · Sudden increased shortness of breath, sudden onset of chest pain, localized chest pain with coughing   · Incision  opening   Or, if there are any questions or concerns about medications or care.     Infection statistic resource:   https://www.[employer]ers2020/contents/prosthetic-joint-infection-epidemiology-microbiology-clinical-manifestations-and-diagnosis     -Is this patient being discharged with medication to prevent blood clots?  No    · Is patient discharged on Warfarin / Coumadin?   No     Depression / Suicide Risk    As you are discharged from this [facility] facility, it is important to learn how to keep safe from harming yourself.    Recognize the warning signs:  · Abrupt changes in personality, positive or negative- including increase in energy   · Giving away possessions  · Change in eating patterns- significant weight changes-  positive or negative  · Change in sleeping patterns- unable to sleep or sleeping all the time   · Unwillingness or inability to communicate  · Depression  · Unusual sadness, discouragement and loneliness  · Talk of wanting to die  · Neglect of personal appearance   · Rebelliousness- reckless behavior  · Withdrawal from people/activities they love  · Confusion- inability to concentrate     If you or a loved one observes any of these behaviors or has concerns about self-harm, here's what you can do:  · Talk about it- your feelings and reasons for harming yourself  · Remove any means that you might use to hurt yourself (examples: pills, rope, extension cords, firearm)  · Get professional help from the community (Mental Health, Substance Abuse, psychological counseling)  · Do not be alone:Call your Safe Contact- someone whom you trust who will be there for you.  · Call your local CRISIS HOTLINE 706-4971 or 772-483-2173  · Call your local Children's Mobile Crisis Response Team [place] (696) 293-4611 or www.C-Note  · Call the toll free National Suicide Prevention Hotlines   · National Suicide Prevention Lifeline 599-808-LXXX (1015)  · National Hope Line Network 800-EQDCZNQ  (996-9082)

## 2020-09-15 NOTE — THERAPY
Physical Therapy   Initial Evaluation     Patient Name: April Alicia  Age:  60 y.o., Sex:  female  Medical Record #: [id number]  Today's Date: 9/15/2020     Precautions: Weight Bearing As Tolerated Left Lower Extremity    Assessment  Patient is 60 y.o. female with a diagnosis of pancytopenia.  Additional factors influencing patient status / progress: today, pt shows edema at L knee, but is supervised for activity, OOB, transfers, ambulation using FWW x 300 feet and up/down a flight of stairs. Pt is familiar with therex to improve knee flexion and extension and has been attending outpt PT and will continue with that. No further inpt PT needs.      Plan    Recommend Physical Therapy for Evaluation only     DC Equipment Recommendations: None  Discharge Recommendations: Recommend outpatient physical therapy services to address higher level deficits(continue with outpt PT)        Objective       09/15/20 1006   Pain 0 - 10 Group   Therapist Pain Assessment During Activity  (not rated, L knee painful.)   Prior Living Situation   Prior Services Home With Outpatient Therapy   Housing / Facility 2 Story Apartment / Condo   Steps Into Home   (flight)   Steps In Home 0   Rail Right Rail (Steps into Home)   Elevator No   Equipment Owned 4-Wheel Walker;Front-Wheel Walker   Lives with - Patient's Self Care Capacity Significant Other   Comments pt reports that she lives with her SO who is home as needed and can help as needed.    Prior Level of Functional Mobility   Bed Mobility Independent   Transfer Status Independent   Ambulation Independent   Assistive Devices Used Front-Wheel Walker   Stairs Independent   Active ROM Lower Body    Lt Knee Flexion Degrees 50   Lt Knee Extension Degrees (!) 10  (10 degrees short of full extension)   Gait Analysis   Gait Level Of Assist Supervised   Assistive Device Front Wheel Walker   Distance (Feet) 300   Deviation   (limps due to lack of full ROM L LE )   # of Stairs Climbed 12    Level of Assist with Stairs Supervised   Bed Mobility    Supine to Sit Supervised   Sit to Supine Supervised   Scooting Supervised   Rolling Supervised   Functional Mobility   Sit to Stand Supervised   Bed, Chair, Wheelchair Transfer Supervised   Education Group   Education Provided Exercises - Seated   Exercises - Seated Patient Response   (review of therex for improving knee flexion and extension. )   Additional Comments pt is familiar with therex and has been attending outpt PT.    Anticipated Discharge Equipment and Recommendations   DC Equipment Recommendations None   Discharge Recommendations Recommend outpatient physical therapy services to address higher level deficits  (continue with outpt PT)   Session Information   Date / Session Number  9/15-1x only

## 2020-09-15 NOTE — PROGRESS NOTES
PERLITA Thompson outbound TC to pt. For Community Health Worker Intake 9/15/20.  • Social determinates of health intake completed.   • Identified financial security barriers. Pt. Declined financial resources at this time.  • Contact information provided to April Alicia.  • Pt. Has upcoming appointment with PCP Juanita King 9/23/20.  • Outpatient assessment completed.  • No medications prescribed.     [facility] information provided to pt.   Pt. Reports no other needs or concerns at this time. CHW encouraged pt. To TC CCM if things change.    Outcome:  Pt. Has met all CCM goals, therefore she will be d/c'ed from CCM.

## 2020-09-15 NOTE — PROGRESS NOTES
Pt being dc'd to home with no needs. no brace. The following prescriptions given none. IV dc'd. Dc instructions discussed with patient. All questions answered.  Patient  agreeable to dc plan. Tegan Bedside RN to confirm that patient has all belongings at dc and that all home care needs have been arranged.

## 2020-09-15 NOTE — PROGRESS NOTES
Pt discharged home via wheelchair. IV d/c'd by Jayshree (HILARIO RN). All personal belongings sent with pt including personal walker. Discharge summary reviewed with pt by HILARIO RN and all questions answered. Pt agreeable to D/C plan.

## 2020-09-15 NOTE — THERAPY
"Occupational Therapy   Initial Evaluation     Patient Name: April Alicia  Age:  60 y.o., Sex:  female  Medical Record #: [id number]  Today's Date: 9/15/2020     Precautions  Precautions:  Weight Bearing As Tolerated Left Lower Extremity    Assessment  Patient is 60 y.o. female with post TKA pain and inflammation. Patient was slightly fatigued after standing for 5 minutes. Pain and stiffness did not limit her ability to perform ADLs.      Plan    Recommend Occupational Therapy for Evaluation only.    DC Equipment Recommendations: none  Discharge Recommendations: Anticipate no further OT needs     Subjective  \"I use this walker so I can sit down\"     Objective   09/15/20 0945   Initial Contact Note    Initial Contact Note Order Received and Verified, Evaluation Only - Patient Does Not Require Further Acute Occupational Therapy at this Time.  However, May Benefit from Post Acute Therapy for Higher Level Functional Deficits.   Prior Living Situation   Prior Services Home With Outpatient Therapy   Housing / Facility 2 Story Apartment / Condo   Steps Into Home   (flight)   Bathroom Set up Bathtub / Shower Combination   Equipment Owned Front-Wheel Walker;4-Wheel Walker;Bed Side Commode   Lives with - Patient's Self Care Capacity Alone and Able to Care For Self   Comments Stayed with daughter after surgery   Prior Level of ADL Function   Self Feeding Independent   Grooming / Hygiene Independent   Bathing Independent   Dressing Independent   Toileting Independent   Prior Level of IADL Function   Medication Management Independent   Kitchen Mobility Independent   Prior Level Of Mobility Independent With Device in Community  (Uses 4WW, so she can sit if feeling fatigued)   Pain 0 - 10 Group   Therapist Pain Assessment During Activity   Cognition    Cognition / Consciousness WDL   Balance Assessment   Sitting Balance (Static) Good   Sitting Balance (Dynamic) Fair +   Standing Balance (Static) Fair   Standing Balance " (Dynamic) Fair   Weight Shift Sitting Good   Weight Shift Standing Fair   Bed Mobility    Supine to Sit Supervised   Sit to Supine Supervised   Scooting Supervised   ADL Assessment   Grooming Supervision   Upper Body Dressing Supervision   Toileting Supervision   Comments Px was able to maintain balance standing at sink   Functional Mobility   Sit to Stand Supervised   Bed, Chair, Wheelchair Transfer Supervised   Toilet Transfers Supervised   Transfer Method Stand Pivot   Comments Px performed functional transfers with supervision and use of 4WW   Edema / Skin Assessment   Comments Edema around L knee   Activity Tolerance   Sitting in Chair 5 min   Sitting Edge of Bed 2 min   Standing 5 min   Comments Needed to sit down after standing at sink for 5 minutes   Education Group   Education Provided Activities of Daily Living;Home Safety   Role of Occupational Therapist Patient Response Patient;Acceptance;Explanation;Verbal Demonstration   Home Safety Patient Response Patient;Acceptance;Explanation;Verbal Demonstration   ADL Patient Response Patient;Acceptance;Explanation;Verbal Demonstration   Anticipated Discharge Equipment and Recommendations   DC Equipment Recommendations Unable to determine at this time   Discharge Recommendations Recommend outpatient occupational therapy services to address higher level deficits   Interdisciplinary Plan of Care Collaboration   Collaboration Comments RN updated on patients efforts   Session Information   Date / Session Number  9/15 #1  (1x only)   Priority 0

## 2020-09-15 NOTE — CONSULTS
"DATE OF SERVICE:  09/14/2020    COURTESY CONSULTATION NOTE    HISTORY OF PRESENT ILLNESS:  The patient is a 60-year-old white female well   known to me.  She is 3 weeks out from a left total knee replacement.  She   apparently presented to the emergency room for the third or fourth time since   her surgery last night and was admitted to the hospitalist service.  She first   came in a week after her surgery complaining of knee pain and chest pain to   [facility] with a clean wound and no unusual findings.  She was seen by   me last week in the office for her followup and was doing fine with moderate   swelling about her knee, but her staples were removed as her incision was   healed and she had acceptable range of motion of her knee with no complaints   other than some postoperative pain.  She apparently went to an emergency room.    I am not exactly sure where 2-3 days ago and was told there was pus coming   out of her \"knee\" and that her knee may be infected and she needed to go back   to the ER in 2 days to have this rechecked.  She apparently presented to the   emergency room yesterday and the ER physicians asked if the on-call physician   if the hospitalist could admit her, as I cannot keep seeing her in the   emergency room.  She comes in with no fever.  No true history of fevers.  She   states she had some previous drainage, but there is no evidence of drainage   about her wound now.  She states she is having difficulty walking, but cannot   state why.  She has decreased motion in her left knee, but states it is better   when she is sitting up.  She has no numbness or tingling.  No calf pain.  She   presented to the emergency room with a white count of 3.6, CRP of 2.58 and   afebrile.  She has been admitted by the hospitalist and Dr. Gray did an   aspiration on her left knee today and states she could get very little fluid   out and was just bloody fluid that looked like an organizing hemarthrosis.  "   This was sent to the lab for culture and Gram stain as there was not enough   for a cell count and Gram stain shows no organisms seen and very few white   cells.  Examination of the left knee shows moderate swelling in left knee,   although this is more boggy swelling with no obvious fluid collection.  There   is mild warmth in the left knee as would be expected in a postoperative knee,   her incision is well healed and there is no drainage whatsoever.  She has a   range of 0/1/50 today with good patellar mobility.  She has no pain at all and   midrange of motion and does complain about trying to go past 50-55 degrees in   her knee while she is lying in bed.  She has a soft, nontender calf with an   intact distal neurologic and vascular exam.  Her labs are as above.  X-rays of   the left knee including AP and lateral of the left knee that do show an   effusion, but a well-positioned total knee replacement with no evidence of   loosening or acute abnormalities.  She had an ultrasound in the emergency room   showing no evidence of a DVT.    IMPRESSION:  The patient has some swelling in her left knee and has limited   motion, which I think is causing her pain.  She has a history of not working   especially hard on regaining her motion.  I see no evidence of an infection   today and no reason for her to be in the hospital.  I have recommended that   she go home or offer that we can look into a skilled nursing facility if she   does not feel safe to go home and she states she does not want to go to a   skilled nursing facility.  We will notify the hospitalist that from an   orthopedic standpoint, there is no reason for her to be in the hospital and   will keep an eye on her culture to be sure there is nothing that grows late.    I see no indication whatsoever for surgical intervention at this time.       ____________________________________     MD VARGAS Hartman / SABINE    DD:  09/14/2020 16:52:36  DT:   09/14/2020 19:51:49    D#:  [id number]  Job#:  [id number]

## 2020-09-15 NOTE — CARE PLAN
Problem: Communication  Goal: The ability to communicate needs accurately and effectively will improve  Outcome: PROGRESSING AS EXPECTED  Note: Plan of Care discussed, all questions answered. Pt effectively communicates needs to staff.       Problem: Pain Management  Goal: Pain level will decrease to patient's comfort goal  Outcome: PROGRESSING AS EXPECTED  Note: Pt medicated with PRN oxycodone 5 mg, pt states this is not helpful for her pain. Pt was able to ambulate w/ both PT & OT well.

## 2020-09-16 ENCOUNTER — TELEPHONE (OUTPATIENT)
Dept: PHYSICAL THERAPY | Facility: REHABILITATION | Age: 60
End: 2020-09-16

## 2020-09-16 ENCOUNTER — APPOINTMENT (OUTPATIENT)
Dept: PHYSICAL THERAPY | Facility: REHABILITATION | Age: 60
End: 2020-09-16
Attending: NURSE PRACTITIONER
Payer: MEDICAID

## 2020-09-16 DIAGNOSIS — M17.0 PRIMARY OSTEOARTHRITIS OF BOTH KNEES: ICD-10-CM

## 2020-09-16 PROCEDURE — 97161 PT EVAL LOW COMPLEX 20 MIN: CPT

## 2020-09-16 ASSESSMENT — ACTIVITIES OF DAILY LIVING (ADL)
POOR_BALANCE: 1
AMBULATION_WITH_ASSISTIVE_DEVICE: INDEPENDENT

## 2020-09-16 NOTE — OP THERAPY EVALUATION
Outpatient Physical Therapy  INITIAL EVALUATION    [facility] Therapy [address].  [address]  [place] [address]  Phone:  [phone]  Fax:  [phone]    Date of Evaluation: 09/16/2020    Patient: April Alicia  YOB: 1960  MRN: [id number]     Referring Provider: Víctor Faustin M.D.  [address]  [address]  [place] [address]   Referring Diagnosis Osteoarthritis of knee, unspecified [M17.9]     Time Calculation    Start time: 1423  Stop time: 1459 Time Calculation (min): 36 minutes         Chief Complaint: Knee Problem    Visit Diagnoses     ICD-10-CM   1. Primary osteoarthritis of both knees  M17.0         Subjective:   History of Present Illness:     Mechanism of injury:  60 year old female s/p TKA 8/24/2020. Seen for one visit pre-op and 3 visits post op in this clinic, then discharged after re-admission to acute care setting. Since initial evaluation, has been worked up in ER and acute care setting on 3 occassions for pain and US showed no DVT.     Patient presents for new PT evaluation today. Discharged home from acute care yesterday afternoon. Chief complaint is L knee pain and stiffness. No n/t. No radiating pain. Ambulating with 2WW. Living with her partner. Considering moving back in with her daughter for for a few weeks, who lives locally. No falls.       Past Medical History:   Diagnosis Date   • Arrhythmia    • Arthritis     OA in knees   • ASTHMA    • Blood clotting disorder (HCC) 2018    right leg blood clot   • Dental disorder     upper and lower dentures   • Diabetes    • Emphysema of lung (HCC)    • Heart abnormality     leakage in left valve   • Heart burn     left knee   • Heart valve disease    • Hypertension    • Infection 9/4/2017   • Infection 2018    Right knee after total knee   • Liver disease    • Pneumonia 02/2020   • Seizure disorder (HCC)      Past Surgical History:   Procedure Laterality Date   • PB TOTAL KNEE  ARTHROPLASTY Left 8/24/2020    Procedure: ARTHROPLASTY, KNEE, TOTAL;  Surgeon: Víctor Faustin M.D.;  Location: SURGERY [facility];  Service: Orthopedics   • KNEE ARTHROPLASTY TOTAL Right 2018   • IRRIGATION & DEBRIDEMENT ORTHO Right 9/3/2017    Procedure: IRRIGATION & DEBRIDEMENT ORTHO, POLY EXCHANGE;  Surgeon: Jerome Russell M.D.;  Location: SURGERY [facility];  Service: Orthopedics   • COLONOSCOPY WITH CLIPPING  10/28/2015    Procedure: COLONOSCOPY WITH CLIPPING;  Surgeon: Ruben Colon M.D.;  Location: ENDOSCOPY [facility];  Service:    • COLONOSCOPY WITH SCLEROTHERAPY  10/28/2015    Procedure: COLONOSCOPY WITH SCLEROTHERAPY;  Surgeon: Ruben Colon M.D.;  Location: ENDOSCOPY [facility];  Service:    • COLONOSCOPY WITH TATTOOING  10/28/2015    Procedure: COLONOSCOPY WITH TATTOOING;  Surgeon: Ruben Colon M.D.;  Location: ENDOSCOPY [facility];  Service:    • GYN SURGERY  2003    hysteroscoopy   • GYN SURGERY  1982    tubal ligation     Social History     Tobacco Use   • Smoking status: Current Some Day Smoker     Types: Cigarettes     Last attempt to quit: 12/29/2016     Years since quitting: 3.7   • Smokeless tobacco: Never Used   • Tobacco comment: a few a day when I can   Substance Use Topics   • Alcohol use: No     Family and Occupational History     Socioeconomic History   • Marital status: Single     Spouse name: Not on file   • Number of children: Not on file   • Years of education: Not on file   • Highest education level: Not on file   Occupational History   • Not on file       Objective     Observations   Left Knee   Positive for adhesive scar, edema and incision.     Additional Observation Details  Bruising. Normal post-op swelling.     Active Range of Motion   Left Knee   Flexion: 65 degrees with pain  Extension: -12 degrees with pain  Extensor lag: -12 degrees     Right Knee   Flexion: WFL  Extension: WFL  Extensor lag: WFL    Passive Range of Motion   Left  Knee   Flexion: 78 degrees with pain  Extension: -5 degrees with pain    Patellar Mobility   Left Knee Hypomobile in the left medial, left lateral, left superior and left inferior patellar tendon(s).     Right Knee Patellar tendons within functional limits include the medial, lateral, superior and inferior.     Strength:      Left Knee   Flexion: 4  Extension: 4  Quadriceps contraction: poor    Right Knee   Flexion: 4+  Extension: 4+  Quadriceps contraction: good  Ambulation     Ambulation: Level Surfaces   Ambulation with assistive device: independent (2WW)    Observational Gait   Gait: antalgic   Decreased walking speed, stride length, left stance time and left step length.   Left foot contact pattern: foot flat    Quality of Movement During Gait   Trunk  Forward lean.         Therapeutic Exercises (CPT 35155):     1. Arcs upright stationary bike, 15 min , no charge    2. HEP as below, no charge    3. Gait: cueing for heel toe pattern reducing forward flexion, improved with faded cueing , 150' with 2WW , no charge      Therapeutic Exercise Summary: HEP no charge  Access Code: [email or web address]   URL: [email or web address]   Date: 09/16/2020   Prepared by: Sophia Collins     Exercises  Seated Knee Extension Stretch with Chair - 10 reps - 3 sets - 5x daily - 7x weekly  Seated Knee Flexion Slide - 10 reps - 3 sets - 5x daily - 7x weekly     Seated Knee Flexion Stretch - 5 reps - 30 second hold - 5x daily - 7x weekly  Mini Squat with Counter Support - 10 reps - 5x daily - 7x weekly      Time-based treatments/modalities:           Assessment, Response and Plan:   Impairments: abnormal gait, abnormal or restricted ROM, impaired functional mobility, hypersensitivity, impaired balance, impaired physical strength, lacks appropriate home exercise program, limited mobility, pain with function, safety issue and swelling    Assessment details:  60 year old female s/p TKA 8/24/2020. Seen for one visit pre-op and 3 visits post op  in this clinic, then discharged after re-admission to acute care setting. Since initial evaluation, has been worked up in ER and acute care setting on 3 occassions for pain and US showed no DVT. The patient has significant ROM restrictions, as well as gait/balance and muscle performance impairments, and has shown difficulty in her last episode of care retaining instructions/exercises. Will benefit from skilled PT to address the above at frequency of 3 x per week. Have also encouraged patient to bring her adult daughter with her to her next appointment to assist in carryover of HEP. Patient in agreement with this plan.   Barriers to therapy:  Poorly tolerated treatments, non-compliant with treatment regimen and psychosocial  Prognosis: good    Prognosis details:  Patient will be seen 3 x per week in therapy to mitigate these barriers  Goals:   Short Term Goals:   Patient is independent/compliant with HEP  ROM L knee 0-100 deg  Short term goal time span:  1-2 weeks      Long Term Goals:    Patient is independent/compiant with progressed HEP  ROM L knee 0-130 deg  Quad contraction L = good  Gait WFL with least restrictive AD    Long term goal time span:  4-6 weeks    Plan:   Therapy options:  Physical therapy treatment to continue  Planned therapy interventions:  Therapeutic Exercise (CPT 61135) and Neuromuscular Re-education (CPT 66967)  Frequency:  3x week  Duration in weeks:  4  Discussed with:  Patient      Functional Assessment Used  WOMAC Grand Total: 40.63     Referring provider co-signature:  I have reviewed this plan of care and my co-signature certifies the need for services.    Certification Period: 09/16/2020 to  10/29/20    Physician Signature: ________________________________ Date: ______________

## 2020-09-16 NOTE — OP THERAPY DISCHARGE SUMMARY
Outpatient Physical Therapy  DISCHARGE SUMMARY NOTE      [facility] Therapy [place] [id number]  [address]  [place] [address]  Phone:  [phone]  Fax:  [phone]    Date of Visit: 09/16/2020    Patient: April Alicia  YOB: 1960  MRN: [id number]     Referring Provider: No referring provider defined for this encounter.   Referring Diagnosis No admission diagnoses are documented for this encounter.         Functional Assessment Used        Your patient is being discharged from Physical Therapy with the following comments:   · Patient was admitted to acute care. Will be discharged from this episode of care and evaluated on new referral for greater frequency of visits.    Comments:  Patient will be evaluated on new Referral from Dr. Faustin for increased frequency of visits to 3 x per week.     Limitations Remaining:  Limited ROM. Impairments/limitations s/p L TKA. Impaired gait.     Recommendations:  Patient will return for a new evaluation, as she has been discharged from acute care.     Sophia Collins, PT, DPT    Date: 9/16/2020

## 2020-09-17 LAB
BACTERIA FLD AEROBE CULT: NORMAL
GRAM STN SPEC: NORMAL
SIGNIFICANT IND 70042: NORMAL
SITE SITE: NORMAL
SOURCE SOURCE: NORMAL

## 2020-09-21 ENCOUNTER — APPOINTMENT (OUTPATIENT)
Dept: PHYSICAL THERAPY | Facility: REHABILITATION | Age: 60
End: 2020-09-21
Attending: NURSE PRACTITIONER
Payer: MEDICAID

## 2020-09-23 ENCOUNTER — APPOINTMENT (OUTPATIENT)
Dept: PHYSICAL THERAPY | Facility: REHABILITATION | Age: 60
End: 2020-09-23
Attending: NURSE PRACTITIONER
Payer: MEDICAID

## 2020-09-25 ENCOUNTER — PHYSICAL THERAPY (OUTPATIENT)
Dept: PHYSICAL THERAPY | Facility: REHABILITATION | Age: 60
End: 2020-09-25
Attending: NURSE PRACTITIONER
Payer: MEDICAID

## 2020-09-25 DIAGNOSIS — M17.0 PRIMARY OSTEOARTHRITIS OF BOTH KNEES: ICD-10-CM

## 2020-09-25 DIAGNOSIS — M17.9 OSTEOARTHRITIS OF KNEE, UNSPECIFIED: ICD-10-CM

## 2020-09-25 PROCEDURE — 97014 ELECTRIC STIMULATION THERAPY: CPT

## 2020-09-25 PROCEDURE — 97110 THERAPEUTIC EXERCISES: CPT

## 2020-09-25 NOTE — OP THERAPY DAILY TREATMENT
Outpatient Physical Therapy  DAILY TREATMENT     [address]  [address]  [place] [address]  Phone:  [phone]  Fax:  [phone]    Date: 09/25/2020    Patient: April Alicia  YOB: 1960  MRN: [id number]     Time Calculation    Start time: 0832  Stop time: 0930 Time Calculation (min): 58 minutes         Chief Complaint: Knee Problem    Visit #: 2    SUBJECTIVE:  I've been doing the exercises 4 times a day at least.     OBJECTIVE:  Current objective measures:       AROM L knee 8-92 deg  PROM L knee 3--95 deg    Gait: knee flexed in stance, step to pattern, 2WW      Therapeutic Exercises (CPT 71251):     1. Arcs upright stationary bike, 5 min, repeated with PT tech for additional 15 min at end session, no charge    2. Shuttle, 4c LLE only backboard at 11 with ball behind knee to promote TKE , repeated with PT tech for 5 additional min at end session, no charge    3. TKE to balloon , 20 with 5 sec hold     4. Knee flexion stretch in sitting with OP from therapist, 30 sec x 5    5. PA glides TFJ grade III in sitting , 30 x 3 no charge    6. Knee extension in standing and in mid-stance gait, repeated cueing, 4 min while ambulating with 2WW      Therapeutic Exercise Summary:   Access Code: [email or web address]   URL: [email or web address]   Date: 09/16/2020   Prepared by: Sophia Collins     Exercises  Seated Knee Extension Stretch with Chair - 10 reps - 3 sets - 5x daily - 7x weekly  Seated Knee Flexion Slide - 10 reps - 3 sets - 5x daily - 7x weekly     Seated Knee Flexion Stretch - 5 reps - 30 second hold - 5x daily - 7x weekly  Mini Squat with Counter Support - 10 reps - 5x daily - 7x weekly    Therapeutic Treatments and Modalities:     1. E Stim Unattended (CPT 36287), Russian VMO 10/10 with balloon smash 15'    Time-based treatments/modalities:    Physical Therapy Timed Treatment Charges  Therapeutic exercise minutes (CPT 64598): 28  minutes          ASSESSMENT:   Response to treatment: Improved ROM from last visit. Patient requires frequent cueing. Improved carryover of HEP, and patient will be bringing her adult daughter to a future session to assist with this.    PLAN/RECOMMENDATIONS:   Plan for treatment: therapy treatment to continue next visit.  Planned interventions for next visit: continue with current treatment.

## 2020-09-28 ENCOUNTER — PHYSICAL THERAPY (OUTPATIENT)
Dept: PHYSICAL THERAPY | Facility: REHABILITATION | Age: 60
End: 2020-09-28
Attending: NURSE PRACTITIONER
Payer: MEDICAID

## 2020-09-28 DIAGNOSIS — M17.9 OSTEOARTHRITIS OF KNEE, UNSPECIFIED: ICD-10-CM

## 2020-09-28 DIAGNOSIS — M17.0 PRIMARY OSTEOARTHRITIS OF BOTH KNEES: ICD-10-CM

## 2020-09-28 PROCEDURE — 97110 THERAPEUTIC EXERCISES: CPT

## 2020-09-28 NOTE — OP THERAPY DAILY TREATMENT
Outpatient Physical Therapy  DAILY TREATMENT     [facility] Physical [address].  [address]  [place] [address]  Phone:  [phone]  Fax:  [phone]    Date: 09/28/2020    Patient: April Alicia  YOB: 1960  MRN: [id number]     Time Calculation    Start time: 1005  Stop time: 1120 Time Calculation (min): 75 minutes         Chief Complaint: Knee Problem    Visit #: 3    SUBJECTIVE:  I'm trying to bend it    OBJECTIVE:  Current objective measures:       AROM L knee 8-92 deg  PROM L knee0--105 deg    Gait: L knee flexed in stance, step to pattern, 2WW      Therapeutic Exercises (CPT 75933):     1. Arcs upright stationary bike, with tech 15 min no charge    2. Shuttle, 4c LLE only backboard at 11 with ball behind knee to promote TKE with tech 5 min no charge    3. TKE to balloon , 20 with 5 sec hold     4. Knee flexion AROM seated with skateboard with OP from therapist at end range flexion, 30 x 3    5. PA glides TFJ grade III in sitting , 30 x 3 no charge    6. Knee extension in standing and in mid-stance gait, repeated cueing, 4 min while ambulating with 2WW    7. Nustepper prior to appointment, L4 10 min no charge    8. Ball roll AROM , x 5 min    9. Prone knee extension stretch foot off table with 3# ankle weight, x 5 min    10. Standing TKE and patient education to straighten knee in stance phase of walking - 20' x 5 fair carryover with repeated cueing but tendency to return to flexed knee in stance with faded cueing      Therapeutic Exercise Summary:   Access Code: [email or web address]   URL: [email or web address]   Date: 09/16/2020   Prepared by: Sophia Collins     Exercises  Seated Knee Extension Stretch with Chair - 10 reps - 3 sets - 5x daily - 7x weekly  Seated Knee Flexion Slide - 10 reps - 3 sets - 5x daily - 7x weekly     Seated Knee Flexion Stretch - 5 reps - 30 second hold - 5x daily - 7x weekly  Mini Squat with Counter Support - 10 reps - 5x daily - 7x  weekly    Therapeutic Treatments and Modalities:     1. E Stim Unattended (CPT 04173), Russian VMO 10/10 with balloon smash 15'    Time-based treatments/modalities:    Physical Therapy Timed Treatment Charges  Therapeutic exercise minutes (CPT 87417): 27 minutes          ASSESSMENT:   Response to treatment: Improved ROM from last visit. Patient requires frequent cueing. Improved carryover of HEP, and patient will be bringing her adult daughter to a future session to assist with this.    PLAN/RECOMMENDATIONS:   Plan for treatment: therapy treatment to continue next visit.  Planned interventions for next visit: continue with current treatment.

## 2020-09-30 ENCOUNTER — APPOINTMENT (OUTPATIENT)
Dept: PHYSICAL THERAPY | Facility: REHABILITATION | Age: 60
End: 2020-09-30
Attending: NURSE PRACTITIONER
Payer: MEDICAID

## 2020-10-03 ENCOUNTER — HOSPITAL ENCOUNTER (EMERGENCY)
Facility: MEDICAL CENTER | Age: 60
End: 2020-10-03
Attending: EMERGENCY MEDICINE
Payer: MEDICAID

## 2020-10-03 VITALS
BODY MASS INDEX: 27.94 KG/M2 | SYSTOLIC BLOOD PRESSURE: 136 MMHG | DIASTOLIC BLOOD PRESSURE: 85 MMHG | OXYGEN SATURATION: 98 % | HEART RATE: 61 BPM | RESPIRATION RATE: 15 BRPM | TEMPERATURE: 98.8 F | WEIGHT: 178 LBS | HEIGHT: 67 IN

## 2020-10-03 DIAGNOSIS — T40.601A OPIATE OVERDOSE, ACCIDENTAL OR UNINTENTIONAL, INITIAL ENCOUNTER (HCC): ICD-10-CM

## 2020-10-03 DIAGNOSIS — G89.18 POST-OP PAIN: ICD-10-CM

## 2020-10-03 LAB
ANION GAP SERPL CALC-SCNC: 12 MMOL/L (ref 7–16)
APAP SERPL-MCNC: <5 UG/ML (ref 10–30)
BASOPHILS # BLD AUTO: 0.9 % (ref 0–1.8)
BASOPHILS # BLD: 0.02 K/UL (ref 0–0.12)
BLOOD CULTURE HOLD CXBCH: NORMAL
BUN SERPL-MCNC: 9 MG/DL (ref 8–22)
CALCIUM SERPL-MCNC: 8 MG/DL (ref 8.5–10.5)
CHLORIDE SERPL-SCNC: 107 MMOL/L (ref 96–112)
CO2 SERPL-SCNC: 19 MMOL/L (ref 20–33)
CREAT SERPL-MCNC: 0.56 MG/DL (ref 0.5–1.4)
EOSINOPHIL # BLD AUTO: 0.09 K/UL (ref 0–0.51)
EOSINOPHIL NFR BLD: 3.4 % (ref 0–6.9)
ERYTHROCYTE [DISTWIDTH] IN BLOOD BY AUTOMATED COUNT: 49.8 FL (ref 35.9–50)
GLUCOSE SERPL-MCNC: 140 MG/DL (ref 65–99)
HCT VFR BLD AUTO: 27.5 % (ref 37–47)
HGB BLD-MCNC: 8.2 G/DL (ref 12–16)
LYMPHOCYTES # BLD AUTO: 0.16 K/UL (ref 1–4.8)
LYMPHOCYTES NFR BLD: 6 % (ref 22–41)
MANUAL DIFF BLD: NORMAL
MCH RBC QN AUTO: 24.9 PG (ref 27–33)
MCHC RBC AUTO-ENTMCNC: 29.8 G/DL (ref 33.6–35)
MCV RBC AUTO: 83.6 FL (ref 81.4–97.8)
MONOCYTES # BLD AUTO: 0.07 K/UL (ref 0–0.85)
MONOCYTES NFR BLD AUTO: 2.6 % (ref 0–13.4)
MORPHOLOGY BLD-IMP: NORMAL
NEUTROPHILS # BLD AUTO: 2.35 K/UL (ref 2–7.15)
NEUTROPHILS NFR BLD: 87.1 % (ref 44–72)
NRBC # BLD AUTO: 0 K/UL
NRBC BLD-RTO: 0 /100 WBC
OVALOCYTES BLD QL SMEAR: NORMAL
PLATELET # BLD AUTO: 134 K/UL (ref 164–446)
PLATELET BLD QL SMEAR: NORMAL
PMV BLD AUTO: 9.3 FL (ref 9–12.9)
POIKILOCYTOSIS BLD QL SMEAR: NORMAL
POLYCHROMASIA BLD QL SMEAR: NORMAL
POTASSIUM SERPL-SCNC: 4.1 MMOL/L (ref 3.6–5.5)
RBC # BLD AUTO: 3.29 M/UL (ref 4.2–5.4)
RBC BLD AUTO: PRESENT
SCHISTOCYTES BLD QL SMEAR: NORMAL
SODIUM SERPL-SCNC: 138 MMOL/L (ref 135–145)
WBC # BLD AUTO: 2.7 K/UL (ref 4.8–10.8)

## 2020-10-03 PROCEDURE — 80048 BASIC METABOLIC PNL TOTAL CA: CPT

## 2020-10-03 PROCEDURE — 99285 EMERGENCY DEPT VISIT HI MDM: CPT

## 2020-10-03 PROCEDURE — 80307 DRUG TEST PRSMV CHEM ANLYZR: CPT

## 2020-10-03 PROCEDURE — 85027 COMPLETE CBC AUTOMATED: CPT

## 2020-10-03 PROCEDURE — 85007 BL SMEAR W/DIFF WBC COUNT: CPT

## 2020-10-03 RX ORDER — NALOXONE HYDROCHLORIDE 4 MG/.1ML
1 SPRAY NASAL PRN
Qty: 1 EACH | Refills: 0 | Status: SHIPPED | OUTPATIENT
Start: 2020-10-03 | End: 2020-10-03

## 2020-10-03 RX ORDER — NALOXONE HYDROCHLORIDE 4 MG/.1ML
SPRAY NASAL
Qty: 2 EACH | Refills: 0 | Status: SHIPPED
Start: 2020-10-03 | End: 2022-07-10

## 2020-10-03 ASSESSMENT — FIBROSIS 4 INDEX: FIB4 SCORE: 2.38

## 2020-10-03 NOTE — ED TRIAGE NOTES
"Chief Complaint   Patient presents with   • Drug Overdose     Pt arrives via EMS due to respiratory arrest. Pt was driving around with friends when she went unresponsive. EMS arrived on scene and had to bag patient. Pt was given 1mg of narcan and became responsive. Pt currently aox4, denies drug use. Pt does report taking 3 5/325 oxycodone today. Hx of dm. Blood sugar 163 with EMS.    /66   Pulse 76   Temp 37.5 °C (99.5 °F) (Oral)   Resp 18   Ht 1.702 m (5' 7\")   Wt 80.7 kg (178 lb)   LMP 06/02/2008   SpO2 98%   BMI 27.88 kg/m²     "

## 2020-10-04 NOTE — ED PROVIDER NOTES
"ED Provider Note    Scribed for Dorian Whitaker M.D. by Lauren Chapa. 10/3/2020  5:49 PM    Primary care provider: Juanita King N.P.  Means of arrival: EMS  History obtained from: Patient  History limited by: None    CHIEF COMPLAINT  Chief Complaint   Patient presents with   • Drug Overdose       HPI  April Alicia is a 60 y.o. female who presents to the Emergency Department via EMS for syncopal episode prior to arrival. She was driving with friends and passed out while sitting down. She has associated nausea, chills, some hematuria, some blood in diarrhea, and leg swelling that began yesterday. Patient denies any fever or vomiting. She reports \"not feeling well\" the past few days. Patient had a knee replacement in August and has been using oxycodone 5 mg to manage pain, which she took 3 of today just prior to meeting her friends. Patient took an extra 2 oxycodone because her knee \"was bothering her extra\" today. She denies any drug or alcohol use, but is a smoker. Patient was given narcan en route to the ED.  Patient has a history of blood clots and is currently on blood thinners. She denies any history of aneurysm, heart failure, or heart attack. Patient further denies using any other pain medication or alcohol use today.  Patient did not attempt an intentional overdose.  Patient was given Narcan in the field by paramedics and quickly awoke.    REVIEW OF SYSTEMS  Pertinent positives include: syncope, nausea, chills, some hematuria, some blood in diarrhea, and leg swelling   Pertinent negatives include: fever or vomiting.  10+ systems reviewed and negative.      PAST MEDICAL HISTORY  Past Medical History:   Diagnosis Date   • Arrhythmia    • Arthritis     OA in knees   • ASTHMA    • Blood clotting disorder (HCC) 2018    right leg blood clot   • Dental disorder     upper and lower dentures   • Diabetes    • Emphysema of lung (HCC)    • Heart abnormality     leakage in left valve   • Heart burn     left " knee   • Heart valve disease    • Hypertension    • Infection 9/4/2017   • Infection 2018    Right knee after total knee   • Liver disease    • Pneumonia 02/2020   • Seizure disorder (HCC)          SOCIAL HISTORY  Social History     Tobacco Use   • Smoking status: Current Some Day Smoker     Types: Cigarettes     Last attempt to quit: 12/29/2016     Years since quitting: 3.7   • Smokeless tobacco: Never Used   • Tobacco comment: a few a day when I can   Substance Use Topics   • Alcohol use: No   • Drug use: No     Social History     Substance and Sexual Activity   Drug Use No       SURGICAL HISTORY  Past Surgical History:   Procedure Laterality Date   • PB TOTAL KNEE ARTHROPLASTY Left 8/24/2020    Procedure: ARTHROPLASTY, KNEE, TOTAL;  Surgeon: Víctor Faustin M.D.;  Location: SURGERY [facility];  Service: Orthopedics   • KNEE ARTHROPLASTY TOTAL Right 2018   • IRRIGATION & DEBRIDEMENT ORTHO Right 9/3/2017    Procedure: IRRIGATION & DEBRIDEMENT ORTHO, POLY EXCHANGE;  Surgeon: Jerome Russell M.D.;  Location: SURGERY [facility];  Service: Orthopedics   • COLONOSCOPY WITH CLIPPING  10/28/2015    Procedure: COLONOSCOPY WITH CLIPPING;  Surgeon: Ruben Colon M.D.;  Location: ENDOSCOPY [facility];  Service:    • COLONOSCOPY WITH SCLEROTHERAPY  10/28/2015    Procedure: COLONOSCOPY WITH SCLEROTHERAPY;  Surgeon: Ruben Colon M.D.;  Location: ENDOSCOPY [facility];  Service:    • COLONOSCOPY WITH TATTOOING  10/28/2015    Procedure: COLONOSCOPY WITH TATTOOING;  Surgeon: Ruben Colon M.D.;  Location: ENDOSCOPY [facility];  Service:    • GYN SURGERY  2003    hysteroscoopy   • GYN SURGERY  1982    tubal ligation       CURRENT MEDICATIONS  Current Outpatient Medications:   •  Diclofenac Sodium (VOLTAREN) 1 % Gel, Apply  to skin as directed every 7 days., Disp: , Rfl:   •  lidocaine (LIDODERM) 5 % Patch, Apply 1 Patch to skin as directed every 24 hours. Apply 1 patch to feet or wrist q 24 h  prn, Disp: , Rfl:   •  ondansetron (ZOFRAN ODT) 4 MG TABLET DISPERSIBLE, Take 4 mg by mouth every 6 hours as needed for Nausea., Disp: , Rfl:   •  calcium carbonate (OS-DAILY 500) 1250 (500 Ca) MG Tab, Take 500 mg by mouth every day., Disp: , Rfl:   •  pantoprazole (PROTONIX) 40 MG Tablet Delayed Response, Take 40 mg by mouth every day., Disp: , Rfl:   •  Prenatal Vit-Fe Fumarate-FA (PRENATAL VITAMIN WITH FE/FA) 27-0.8 MG Tab, , Disp: , Rfl:   •  MULTAQ 400 MG Tab, Take 400 mg by mouth 2 times a day, with meals., Disp: , Rfl:   •  felodipine (PLENDIL) 5 MG TABLET SR 24 HR, Take 5 mg by mouth every evening., Disp: , Rfl:   •  gabapentin (NEURONTIN) 400 MG Cap, Take 400 mg by mouth 2 times a day., Disp: , Rfl:   •  metFORMIN ER (GLUCOPHAGE XR) 500 MG TABLET SR 24 HR, Take 500 mg by mouth every evening., Disp: , Rfl:   •  potassium chloride ER (KLOR-CON) 10 MEQ tablet, Take 10 mEq by mouth every day., Disp: , Rfl:   •  tiotropium (SPIRIVA HANDIHALER) 18 MCG Cap, Inhale 18 mcg by mouth every day., Disp: , Rfl:   •  ascorbic acid (ASCORBIC ACID) 500 MG Tab, Take 500 mg by mouth every day., Disp: , Rfl:   •  apixaban (ELIQUIS) 5mg Tab, Take 5 mg by mouth 2 Times a Day., Disp: , Rfl:   •  oxyCODONE immediate-release (ROXICODONE) 5 MG Tab, Take 5 mg by mouth every four hours as needed for Severe Pain., Disp: , Rfl:   •  magnesium oxide (MAG-OX) 400 MG Tab, Take 400 mg by mouth every day., Disp: , Rfl:   •  docusate sodium (COLACE) 100 MG Cap, Take 100 mg by mouth 2 times a day., Disp: , Rfl:   •  losartan (COZAAR) 100 MG Tab, Take 100 mg by mouth every day., Disp: , Rfl:   •  albuterol (VENTOLIN OR PROVENTIL) 108 (90 BASE) MCG/ACT Aero Soln inhalation aerosol, Inhale 2 Puffs by mouth every 6 hours as needed for Shortness of Breath., Disp: , Rfl:      ALLERGIES  Allergies   Allergen Reactions   • Nkda [No Known Drug Allergy]        PHYSICAL EXAM  VITAL SIGNS: /66   Pulse 76   Temp 37.5 °C (99.5 °F) (Oral)   Resp 18    "Ht 1.702 m (5' 7\")   Wt 80.7 kg (178 lb)   LMP 06/02/2008   SpO2 98%   BMI 27.88 kg/m²   Reviewed and high normal blood pressure, afebrile  Constitutional: Well developed, Well nourished, well-appearing.  HENT: Normocephalic, atraumatic, bilateral external ears normal, wearing a mask.   Eyes: PERRLA, conjunctiva pink, no scleral icterus.   Cardiovascular: 1/6 systolic murmur heard at the base. Regular rate and rhythm. No rubs or gallops.  No dependent edema or calf tenderness  Respiratory: Lungs clear to auscultation bilaterally. No wheezes, rales, or rhonchi.  Abdominal:  Abdomen soft, non-tender, non distended. No rebound, or guarding.    Skin: No erythema, no rash.   Genitourinary: No costovertebral angle tenderness.   Musculoskeletal: no deformities.   Neurologic: Alert & oriented x 3, cranial nerves 2-12 intact by passive exam.  No focal deficit noted.  Psychiatric: Affect normal, Judgment normal, Mood normal.     DIFFERENTIAL DIAGNOSIS:  Opiate overdose, dehydration, arrhythmia, subarachnoid hemorrhage, PE, sepsis, GI bleed.    LABORATORY:  Results for orders placed or performed during the hospital encounter of 10/03/20   CBC WITH DIFFERENTIAL   Result Value Ref Range    WBC 2.7 (L) 4.8 - 10.8 K/uL    RBC 3.29 (L) 4.20 - 5.40 M/uL    Hemoglobin 8.2 (L) 12.0 - 16.0 g/dL    Hematocrit 27.5 (L) 37.0 - 47.0 %    MCV 83.6 81.4 - 97.8 fL    MCH 24.9 (L) 27.0 - 33.0 pg    MCHC 29.8 (L) 33.6 - 35.0 g/dL    RDW 49.8 35.9 - 50.0 fL    Platelet Count 134 (L) 164 - 446 K/uL    MPV 9.3 9.0 - 12.9 fL    Neutrophils-Polys 87.10 (H) 44.00 - 72.00 %    Lymphocytes 6.00 (L) 22.00 - 41.00 %    Monocytes 2.60 0.00 - 13.40 %    Eosinophils 3.40 0.00 - 6.90 %    Basophils 0.90 0.00 - 1.80 %    Nucleated RBC 0.00 /100 WBC    Neutrophils (Absolute) 2.35 2.00 - 7.15 K/uL    Lymphs (Absolute) 0.16 (L) 1.00 - 4.80 K/uL    Monos (Absolute) 0.07 0.00 - 0.85 K/uL    Eos (Absolute) 0.09 0.00 - 0.51 K/uL    Baso (Absolute) 0.02 0.00 - " 0.12 K/uL    NRBC (Absolute) 0.00 K/uL   Basic Metabolic Panel   Result Value Ref Range    Sodium 138 135 - 145 mmol/L    Potassium 4.1 3.6 - 5.5 mmol/L    Chloride 107 96 - 112 mmol/L    Co2 19 (L) 20 - 33 mmol/L    Glucose 140 (H) 65 - 99 mg/dL    Bun 9 8 - 22 mg/dL    Creatinine 0.56 0.50 - 1.40 mg/dL    Calcium 8.0 (L) 8.5 - 10.5 mg/dL    Anion Gap 12.0 7.0 - 16.0   ACETAMINOPHEN   Result Value Ref Range    Acetaminophen -Tylenol <5 (L) 10 - 30 ug/mL   Hemoglobin is improving from her postop roxanne per chart review  Lab results reviewed by me.       ED COURSE:  Nursing notes, VS, PMSFHx reviewed in chart.     5:49 PM - Patient seen and examined at bedside. Ordered CBC with diff, BMP, and acetaminophen to evaluate.     8:00 PM - Patient will be discharged home with narcan. Patient verbalizes understanding and agreement to this plan of care.      MEDICAL DECISION MAKING:  Well-appearing patient presents with loss of consciousness that appears to be an accidental opiate overdose.  She was observed in the ER 3 hours and had no recurrent need for Narcan.  She is now 4 hours post accidental overdose with 3 tablets of Percocet.  There is no evidence of significant Tylenol overdose.  There is no suicidal ideation ideation.  There is no evidence of other cause of syncope.    PLAN:  New Prescriptions    NALOXONE (NARCAN) 4 MG/0.1ML LIQUID    Spray 4 mg in nose as needed (somnolence, unresponsiveness).     Overdose handout given  Wean off Percocet    Juanita King, N.P.  [address] [facility] [address]  [phone]    Schedule an appointment as soon as possible for a visit   As needed      CONDITION: good.     FINAL IMPRESSION  1. Opiate overdose, accidental or unintentional, initial encounter ([facility])    2. Post-op pain         Ms. Alicia was here with a confirmed overdose of T40.2 - Other opioids; she has no other known overdoses.       Lauren ARGUETA (Scribe), am scribing for, and in the presence of, Dorian Whitaker,  M.D..    Electronically signed by: Lauren Chapa (Scribe), 10/3/2020    I, Dorian Whitaker M.D. personally performed the services described in this documentation, as scribed by Lauren Chapa in my presence, and it is both accurate and complete. C    The note accurately reflects work and decisions made by me.  Dorian Whitaker M.D.  10/3/2020  9:40 PM

## 2020-10-04 NOTE — PROGRESS NOTES
Naloxone 4 mg/0.1 mL nasal spray (2 pack) was dispensed to the patient for prevention of potential opioid related overdose per protocol.     Counseling was provided regarding:  - Information relating to the recognition, prevention and responses to opioid-related drug overdoses  - How to safely administer the naloxone nasal spray  - Potential side effects and adverse events related to the naloxone nasal spray  - The importance of seeking immediate emergency medical assistance for a person experiencing an opioid-related drug overdose, even after the administration of naloxone  - The immunity from certain civil or criminal liabilities for seeking medical assistance for a person experiencing an opioid-related overdose    Naloxone was given to the patient.     Gloria Toro, PharmD, BCPS

## 2020-10-04 NOTE — DISCHARGE INSTRUCTIONS
You accidentally overdosed on your oxycodone.  It is time to start weaning off this medication.  Never take more than prescribed.  Do not mix with other sedatives.  Keep Narcan with you so that family or friends can administer it if you have a problem again.  Start taking ibuprofen and Tylenol instead for pain unless the ibuprofen upsets the stomach.    You had a borderline or high normal blood pressure reading today.  This does not necessarily mean you have hypertension.  Please followup with your/a primary physician for comprehensive blood pressure evaluation and yearly fasting cholesterol assessment.  BP Readings from Last 3 Encounters:   10/03/20 141/73   09/15/20 145/80   09/09/20 119/55

## 2020-10-04 NOTE — ED NOTES
D/C instructions provided to patient at bedside, verbalizes understanding and states plans for follow-up with PCP as recommended.   Narcan taken home by patient. Pt aware of use.   IV removed and dressed.   All belongings accounted for, all questions answered at this time.   Pt ambulated to lobby with mild limp, standby assistance provided by RN. Pt to take MTM home.   
Patient ambulatory with a steady gate to bathroom and around nurses station. RN aware.  
Pharmacist to bedside for Narcan education.   
Pharmacy notified patient is need of narcan to go home. Pharmacist working on obtaining some for D/C.   
Pt given water and food for PO challenge.   
Pt using bedpan in room.     
Report received from Camilla MONTAÑO. Pt placed on bed pan.   Pt educated on need to call for assistance, pt understanding of instruction. Call light within reach. Whiteboard updated.   
generalized

## 2020-10-05 ENCOUNTER — PHARMACY VISIT (OUTPATIENT)
Dept: PHARMACY | Facility: MEDICAL CENTER | Age: 60
End: 2020-10-05
Payer: COMMERCIAL

## 2020-10-05 ENCOUNTER — PHYSICAL THERAPY (OUTPATIENT)
Dept: PHYSICAL THERAPY | Facility: REHABILITATION | Age: 60
End: 2020-10-05
Attending: NURSE PRACTITIONER
Payer: MEDICAID

## 2020-10-05 DIAGNOSIS — M17.0 PRIMARY OSTEOARTHRITIS OF BOTH KNEES: ICD-10-CM

## 2020-10-05 DIAGNOSIS — M17.9 OSTEOARTHRITIS OF KNEE, UNSPECIFIED: ICD-10-CM

## 2020-10-05 PROCEDURE — 97110 THERAPEUTIC EXERCISES: CPT

## 2020-10-05 PROCEDURE — RXMED WILLOW AMBULATORY MEDICATION CHARGE: Performed by: EMERGENCY MEDICINE

## 2020-10-05 NOTE — OP THERAPY DAILY TREATMENT
Outpatient Physical Therapy  DAILY TREATMENT     [facility] Physical [address].  [address]  [place] [address]  Phone:  [phone]  Fax:  [phone]    Date: 10/05/2020    Patient: April Alicia  YOB: 1960  MRN: [id number]     Time Calculation    Start time: 1000  Stop time: 1048 Time Calculation (min): 48 minutes         Chief Complaint: Knee Problem    Visit #: 4    SUBJECTIVE:  The patient missed her last appointment due to her cab not arriving. Since her last visit, she unintentionally overdosed on Oxycodone. She was in the ER due to this on 10/30/2020, given Narcan by paramedics on scene.    OBJECTIVE:  Current objective measures:     Integument: increased brown shading and fairly superficial area of increased density L lateral patellar area      AROM L knee 3- 105 deg  PROM L knee 0-110 deg  PROM  Prone  0-95 deg    Gait: Improving gait pattern, increased extension in stance, standing more upright.    Gait with QC independent with good step through pattern. Advised patient to use her quad cane at home for ambulation.       Therapeutic Exercises (CPT 07310):     1. Full rotations on upright stationary bike, 5 min seat at 2, cueing from therapist for full rotation, decreased hip hike    2. Shuttle, 4c LLE only backboard at 11 with ball behind knee to promote TKE with tech 5 min no charge    3. Additional 10 minutes on bike at end appointment, no charge    4. Knee flexion AROM seated with skateboard with OP from therapist at end range flexion, 30 x 3    5. PA glides and AP TFJ grade III in supine with 20 deg knee flexion, no charge    6. Knee extension in standing and in mid-stance gait, repeated cueing, 4 min while ambulating with 2WW    7. Prone knee flexion stretch/AROM with OP from therapist, 10 with 5 sec hold    9. Prone knee extension stretch foot off table with 3# ankle weight, x 5 min    10. Standing TKE and patient education to straighten knee in  stance phase of walking - 20' x 5 fair carryover with repeated cueing but tendency to return to flexed knee in stance with faded cueing      Therapeutic Exercise Summary:   Access Code: [email or web address]   URL: [email or web address]   Date: 09/16/2020   Prepared by: Sophia Collins     Exercises  Seated Knee Extension Stretch with Chair - 10 reps - 3 sets - 5x daily - 7x weekly  Seated Knee Flexion Slide - 10 reps - 3 sets - 5x daily - 7x weekly     Seated Knee Flexion Stretch - 5 reps - 30 second hold - 5x daily - 7x weekly  Mini Squat with Counter Support - 10 reps - 5x daily - 7x weekly    Therapeutic Treatments and Modalities:     1. E Stim Unattended (CPT 20155), Russian VMO 10/10 with balloon smash 15'    Time-based treatments/modalities:    Physical Therapy Timed Treatment Charges  Therapeutic exercise minutes (CPT 22621): 27 minutes          ASSESSMENT:   Response to treatment: Improved ROM from last visit. Patient requires frequent cueing. Improved carryover of HEP, and patient will be bringing her adult daughter to a future session to assist with this.    PLAN/RECOMMENDATIONS:   Plan for treatment: therapy treatment to continue next visit.  Planned interventions for next visit: continue with current treatment.

## 2020-10-07 ENCOUNTER — PHYSICAL THERAPY (OUTPATIENT)
Dept: PHYSICAL THERAPY | Facility: REHABILITATION | Age: 60
End: 2020-10-07
Attending: NURSE PRACTITIONER
Payer: MEDICAID

## 2020-10-07 DIAGNOSIS — M17.9 OSTEOARTHRITIS OF KNEE, UNSPECIFIED: ICD-10-CM

## 2020-10-07 DIAGNOSIS — M17.0 PRIMARY OSTEOARTHRITIS OF BOTH KNEES: ICD-10-CM

## 2020-10-07 PROCEDURE — 97014 ELECTRIC STIMULATION THERAPY: CPT

## 2020-10-07 PROCEDURE — 97110 THERAPEUTIC EXERCISES: CPT

## 2020-10-07 NOTE — OP THERAPY DAILY TREATMENT
Outpatient Physical Therapy  DAILY TREATMENT     [facility] Physical [address]  [address]  [place] [address]  Phone:  [phone]  Fax:  [phone]    Date: 10/07/2020    Patient: April Alicia  YOB: 1960  MRN: [id number]     Time Calculation    Start time: 1015  Stop time: 1120 Time Calculation (min): 65 minutes         Chief Complaint: Knee Problem    Visit #: 5    SUBJECTIVE:  Mild discomfort and dark area lateral knee that has improved since last visit. Patient reports that this actually was due to her falling asleep with an ice pack on her knee.     OBJECTIVE:  Current objective measures:     Integument: increased brown shading and fairly superficial area of increased density L lateral patellar area      AROM L knee 3- 110 deg  PROM L knee 0-118 deg  PROM  Prone  0-98deg    Gait: Improving gait pattern, increased extension in stance, standing more upright.    Gait with QC independent with good step through pattern. Advised patient to use her quad cane at home for ambulation.       Therapeutic Exercises (CPT 40749):     1. Full rotations on upright stationary bike, 15 min prior to appointment, no charge    2. Shuttle, 4c LLE only backboard at 11 with ball behind knee to promote TKE with tech 5 min no charge    4. Knee flexion AROM seated with skateboard with OP from therapist at end range flexion, 30 x 3    5. PA glides and AP TFJ grade III d patellar glides caudal at end range flexion grade III x 20 in supine with 20 deg knee flexion, no charge    6. Knee extension in standing and in mid-stance gait, repeated cueing, 4 min while ambulating with 2WW    7. Prone knee flexion stretch/AROM with OP from therapist, 10 with 5 sec hold    8. STS , 10 x 3    9. Prone knee extension stretch foot off table with 3# ankle weight, x 5 min    10. Patient education: scar mobilization     11. Patient education: knee extension stretch prone hang or towel roll under heel       Therapeutic Exercise Summary:   Access Code: [email or web address]   URL: [email or web address]   Date: 09/16/2020   Prepared by: Sophia Collins     Exercises  Seated Knee Extension Stretch with Chair - 10 reps - 3 sets - 5x daily - 7x weekly  Seated Knee Flexion Slide - 10 reps - 3 sets - 5x daily - 7x weekly     Seated Knee Flexion Stretch - 5 reps - 30 second hold - 5x daily - 7x weekly  Mini Squat with Counter Support - 10 reps - 5x daily - 7x weekly    Therapeutic Treatments and Modalities:     1. E Stim Unattended (CPT 81468), Russian VMO 10/10 in prone with mhp 3# ankle weight to promote TKE, Patient only able to tolerate 2 min , Moroccan VMO in supine 10/10 with heel elevated, pillow behind knee with quad set 10/10 w/mhp, 10 min    Time-based treatments/modalities:    Physical Therapy Timed Treatment Charges  Therapeutic exercise minutes (CPT 98156): 28 minutes          ASSESSMENT:   Response to treatment: Improved flexion ROM but still having difficulty maintaining full extension between appointments. Ambulating safely with quad cane.     PLAN/RECOMMENDATIONS:   Plan for treatment: therapy treatment to continue next visit.  Planned interventions for next visit: continue with current treatment. Focus on home strategies for for TKE. Begin loading quad/HS. STS, bridging

## 2020-10-09 ENCOUNTER — PHYSICAL THERAPY (OUTPATIENT)
Dept: PHYSICAL THERAPY | Facility: REHABILITATION | Age: 60
End: 2020-10-09
Attending: NURSE PRACTITIONER
Payer: MEDICAID

## 2020-10-09 DIAGNOSIS — M17.0 PRIMARY OSTEOARTHRITIS OF BOTH KNEES: ICD-10-CM

## 2020-10-09 PROCEDURE — 97110 THERAPEUTIC EXERCISES: CPT

## 2020-10-09 NOTE — OP THERAPY DAILY TREATMENT
"  Outpatient Physical Therapy  DAILY TREATMENT     [facility] Physical Therapy [address].  [address]  [place] [address]  Phone:  [phone]  Fax:  [phone]    Date: 10/09/2020    Patient: April Alicia  YOB: 1960  MRN: [id number]     Time Calculation    Start time: 1017  Stop time: 1100 Time Calculation (min): 43 minutes         Chief Complaint: No chief complaint on file.    Visit #: 6    SUBJECTIVE:  Moved wrong in bed last night and felt a pop and it has been sore since  OBJECTIVE:  -5-90--erp          Therapeutic Treatments and Modalities:     Therapeutic Treatment and Modalities Summary: Bent knee a/p mobs at end range flexion gd 23-//\"better 95   Ball roll with focus on hamstring recruitment    Ball bridges 5-10 second holds  Balloon smash and bounce// instructed patient to perform balloon ex when she hurts at home--issued h/o    Russian 5/5 to vmo with contrast  X 15'    Time-based treatments/modalities:    Physical Therapy Timed Treatment Charges  Therapeutic exercise minutes (CPT 37288): 25 minutes      Pain rating (1-10) before treatment:  8  Pain rating (1-10) after treatment: 8 feels good but still sore\"  0-108  ASSESSMENT: limited rom prior to entering clinic with good increased arom after treatment--limited carryover between visits with questionable compliance of HEP         PLAN/RECOMMENDATIONS:     Progress post chain and arom as tolerated with focus on quad recruitment         "

## 2020-10-12 ENCOUNTER — PHYSICAL THERAPY (OUTPATIENT)
Dept: PHYSICAL THERAPY | Facility: REHABILITATION | Age: 60
End: 2020-10-12
Attending: NURSE PRACTITIONER
Payer: MEDICAID

## 2020-10-12 DIAGNOSIS — M17.0 PRIMARY OSTEOARTHRITIS OF BOTH KNEES: ICD-10-CM

## 2020-10-12 DIAGNOSIS — M17.9 OSTEOARTHRITIS OF KNEE, UNSPECIFIED: ICD-10-CM

## 2020-10-12 PROCEDURE — 97110 THERAPEUTIC EXERCISES: CPT

## 2020-10-12 PROCEDURE — 97014 ELECTRIC STIMULATION THERAPY: CPT

## 2020-10-12 NOTE — OP THERAPY DAILY TREATMENT
"  Outpatient Physical Therapy  DAILY TREATMENT     [facility] Physical [address].  [address]  [place] [address]  Phone:  [phone]  Fax:  [phone]    Date: 10/12/2020    Patient: April Alicia  YOB: 1960  MRN: [id number]     Time Calculation    Start time: 1030  Stop time: 1125 Time Calculation (min): 55 minutes         Chief Complaint: Knee Problem    Visit #: 7    SUBJECTIVE:  Both knees have been sore.  OBJECTIVE:  AROM   Start session: -5-108 deg  End session: 0-110 deg    PROM   Start session: -3-112 deg  End session: 2-118 deg      Therapeutic Exercises (CPT 29868):     1. Upright stationary bike seat L3, full rotations 5 min    2. STS from mat, x 10, with R foot on 4\" step to promote improved WB L 2 x 10    4. TKE to pink band with BUE support, 15 with 5 sec hold, good quad recruitment with tactile/verbal cueing    5. Ball roll in hooklying with focus on HS recruitment, x 25    6. Bridging in hooklying , 3 x 5 with L foot moved back to increase knee flexion with each set    7. Quad set in supine, 10 with 5 sec hold    8. Standing HS curl L BUE support, x 15    9. Gait: cueing for heel toe pattern, equal stance time, knee extension in stance    10. PA glides in prone at 25 deg knee flexion 2 x 30 grade III no charge    11. AP glides at end naz flexion 2 x 30 grade III no charge      Therapeutic Exercise Summary: Access Code: [email or web address]        URL: [email or web address]   Date: 09/16/2020   Prepared by: Sophia Colilns      Exercises  Seated Knee Extension Stretch with Chair - 10 reps - 3 sets - 5x daily - 7x weekly  Seated Knee Flexion Slide - 10 reps - 3 sets - 5x daily - 7x weekly                Seated Knee Flexion Stretch - 5 reps - 30 second hold - 5x daily - 7x weekly  STS- 10 reps - 5x daily - 7x weekly  Quad sets with balloon smash 10 reps - 3 sets - 5x daily - 7x weekly      Therapeutic Treatments and Modalities:     1. E Stim Unattended " "(CPT 62030), Namibian L quad w/balloon smash w/mhp 5/5 15'    Therapeutic Treatment and Modalities Summary: Bent knee a/p mobs at end range flexion gd 23-//\"better 95   Ball roll with focus on hamstring recruitment    Ball bridges 5-10 second holds  Balloon smash and bounce// instructed patient to perform balloon ex when she hurts at home--issued h/o    Russian 5/5 to vmo with contrast  X 15'    Time-based treatments/modalities:    Physical Therapy Timed Treatment Charges  Therapeutic exercise minutes (CPT 90806): 30 minutes      Pain rating (1-10) before treatment:  8  Pain rating (1-10) after treatment: 8 feels good but still sore\"  0-108  ASSESSMENT: limited rom prior to entering clinic with good increased arom after treatment--limited carryover between visits with questionable compliance of HEP         PLAN/RECOMMENDATIONS:     Progress post chain and arom as tolerated with focus on quad recruitment         "

## 2020-10-14 ENCOUNTER — PHYSICAL THERAPY (OUTPATIENT)
Dept: PHYSICAL THERAPY | Facility: REHABILITATION | Age: 60
End: 2020-10-14
Attending: NURSE PRACTITIONER
Payer: MEDICAID

## 2020-10-14 DIAGNOSIS — M17.0 PRIMARY OSTEOARTHRITIS OF BOTH KNEES: ICD-10-CM

## 2020-10-14 DIAGNOSIS — M17.9 OSTEOARTHRITIS OF KNEE, UNSPECIFIED: ICD-10-CM

## 2020-10-14 PROCEDURE — 999999 HB NO CHARGE

## 2020-10-14 NOTE — OP THERAPY DAILY TREATMENT
Patient arrived for appointment and was brought by PT tech, at direction of PT, to stationary bike where she biked for 10 min. She then informed PT that she was feeling unwell and that she had vomited several times yesterday. Patient sent home and advised to contact PCP. Patient intends to visit  clinic on way home.

## 2020-10-14 NOTE — OP THERAPY DAILY TREATMENT
"  Outpatient Physical Therapy  DAILY TREATMENT     [facility] Physical [address].  [address]  [place] [address]  Phone:  [phone]  Fax:  [phone]    Date: 10/14/2020    Patient: April Alicia  YOB: 1960  MRN: [id number]     Time Calculation                   Chief Complaint: No chief complaint on file.    Visit #: 8    SUBJECTIVE:  Both knees have been sore.  OBJECTIVE:  AROM   Start session: -5-108 deg  End session: 0-110 deg    PROM   Start session: -3-112 deg  End session: 2-118 deg      Therapeutic Exercises (CPT 71544):     1. Upright stationary bike seat L3, full rotations 5 min    2. STS from mat, x 10, with R foot on 4\" step to promote improved WB L 2 x 10    4. TKE to pink band with BUE support, 15 with 5 sec hold, good quad recruitment with tactile/verbal cueing    5. Ball roll in hooklying with focus on HS recruitment, x 25    6. Bridging in hooklying , 3 x 5 with L foot moved back to increase knee flexion with each set    7. Quad set in supine, 10 with 5 sec hold    8. Standing HS curl L BUE support, x 15    9. Gait: cueing for heel toe pattern, equal stance time, knee extension in stance    10. PA glides in prone at 25 deg knee flexion 2 x 30 grade III no charge    11. AP glides at end range flexion 2 x 30 grade III no charge      Therapeutic Exercise Summary: Access Code: [email or web address]   URL: [email or web address]   Date: 10/14/2020   Prepared by: Sophia Collins     Exercises  Seated Knee Extension Stretch with Chair - 10 reps - 3 sets - 5x daily - 7x weekly  Seated Knee Flexion Slide - 10 reps - 3 sets - 5x daily - 7x weekly       Supine Bridge - 10 reps - 3 sets - 1x daily - 7x weekly  Sit to Stand - 10 reps - 3 sets - 1x daily - 7x weekly  Standing Terminal Knee Extension with Resistance - 10 reps - 3 sets - 1x daily - 7x weekly      Therapeutic Treatments and Modalities:     1. E Stim Unattended (CPT 08936), Russian L quad w/balloon smash " "w/mhp 5/5 15'    Therapeutic Treatment and Modalities Summary: Bent knee a/p mobs at end range flexion gd 23-//\"better 95   Ball roll with focus on hamstring recruitment    Ball bridges 5-10 second holds  Balloon smash and bounce// instructed patient to perform balloon ex when she hurts at home--issued h/o    Russian 5/5 to vmo with contrast  X 15'    Time-based treatments/modalities:           Pain rating (1-10) before treatment:  8  Pain rating (1-10) after treatment: 8 feels good but still sore\"  0-108  ASSESSMENT: limited rom prior to entering clinic with good increased arom after treatment--limited carryover between visits with questionable compliance of HEP         PLAN/RECOMMENDATIONS:     Progress post chain and arom as tolerated with focus on quad recruitment         "

## 2020-10-16 ENCOUNTER — APPOINTMENT (OUTPATIENT)
Dept: PHYSICAL THERAPY | Facility: REHABILITATION | Age: 60
End: 2020-10-16
Attending: NURSE PRACTITIONER
Payer: MEDICAID

## 2020-10-19 ENCOUNTER — APPOINTMENT (OUTPATIENT)
Dept: PHYSICAL THERAPY | Facility: REHABILITATION | Age: 60
End: 2020-10-19
Attending: NURSE PRACTITIONER
Payer: MEDICAID

## 2020-10-21 ENCOUNTER — APPOINTMENT (OUTPATIENT)
Dept: PHYSICAL THERAPY | Facility: REHABILITATION | Age: 60
End: 2020-10-21
Attending: NURSE PRACTITIONER
Payer: MEDICAID

## 2020-10-23 ENCOUNTER — APPOINTMENT (OUTPATIENT)
Dept: PHYSICAL THERAPY | Facility: REHABILITATION | Age: 60
End: 2020-10-23
Attending: NURSE PRACTITIONER
Payer: MEDICAID

## 2021-01-12 ENCOUNTER — TELEPHONE (OUTPATIENT)
Dept: PHYSICAL THERAPY | Facility: REHABILITATION | Age: 61
End: 2021-01-12

## 2021-01-12 NOTE — OP THERAPY DISCHARGE SUMMARY
Outpatient Physical Therapy  DISCHARGE SUMMARY NOTE      [address]  [address]  [place] [address]  Phone:  [phone]  Fax:  [phone]    Date of Visit: 01/12/2021    Patient: April Alicia  YOB: 1960  MRN: [id number]     Referring Provider: No referring provider defined for this encounter.   Referring Diagnosis No admission diagnoses are documented for this encounter.         Functional Assessment Used        Your patient is being discharged from Physical Therapy with the following comments:   · Patient cancelled or missed more than 2 scheduled appointments/non-compliant    Comments:  Ms. Alicia was seen for PT in two separate episodes following her TKA, with increase in frequency from 2 x per week to 3 x per week for the second episode of care. Unfortunately, she late cancelled and no showed many appointments, missing several appointments to make ER trips for pain management. She also had difficulty recalling and performing the HEP - mitigated with minimal improvement with handouts, simplification of HEP, and numerous attempts to involve her adult daughter. She did make some progress in ROM and gait pattern, but did not return for additional follow ups after 10/14/2020.     Limitations Remaining:  ?    Recommendations:  D/c per clinic policy.     Sophia Collins, PT, DPT    Date: 1/12/2021

## 2021-03-15 DIAGNOSIS — Z23 NEED FOR VACCINATION: ICD-10-CM

## 2021-03-24 ENCOUNTER — PHYSICAL THERAPY (OUTPATIENT)
Dept: PHYSICAL THERAPY | Facility: REHABILITATION | Age: 61
End: 2021-03-24
Attending: ORTHOPAEDIC SURGERY
Payer: MEDICAID

## 2021-03-24 DIAGNOSIS — M17.0 PRIMARY OSTEOARTHRITIS OF BOTH KNEES: ICD-10-CM

## 2021-03-24 PROCEDURE — 97162 PT EVAL MOD COMPLEX 30 MIN: CPT

## 2021-03-24 NOTE — OP THERAPY EVALUATION
"  Outpatient Physical Therapy  INITIAL EVALUATION    [facility] Physical Therapy [address].  [address]  [place] [address]  Phone:  [phone]  Fax:  [phone]    Date of Evaluation: 03/24/2021    Patient: April Alicia  YOB: 1960  MRN: [id number]     Referring Provider: Víctor Faustin M.D.  [address]  [address]  [place] [address]   Referring Diagnosis Bilateral primary osteoarthritis of knee [M17.0]     Time Calculation    Start time: 1030  Stop time: 1130 Time Calculation (min): 60 minutes         Chief Complaint: Knee Problem    Visit Diagnoses     ICD-10-CM   1. Primary osteoarthritis of both knees  M17.0       No data found  Subjective:   History of Present Illness:     Date of surgery: L TKA 08/24/2021.    Mechanism of injury:  Pt had been seen in this clinic for PT s/p her L TKA initially. See notes in \"treatment\" session for details.    Pt \"Priyanka\" states she had a f/u with her doctor who said xrays looked good, just needs more therapy to decrease her stiffness in her knee. More stiffness on the L side, would like to work on the L knee ROM. Going up and down the stairs is difficult for her. Getting in and out of the car is challenging. Sometimes getting up from the toilet is hard, going to ask her manager for a bar.     Had a fall in her shower about a year ago, now has a shower chair and grab bars in the shower and keeps her phone near her. Daughter trying to get her set up with lift alert. Pt does get numbness/throbbing. Does have neuropathy in her feet.     She tripped and fell yesterday at the bus stop and landed on her knees and R arm. Knees are bruised and sore today, but has been able to walk. Her R shoulder is sore, she can move it, but in limited ROM. R pec/ribs have been more sore since the fall and has pain with deep breaths. Had a fall the previous week as well. Sometimes gets dizzy, comes out of nowhere. Used a walker and cane when " "she got out of the hospital, not using them recently.     PMHx: diabetes    Vitals:  O2 sat: 96%  HR: 60 bpm  BP: 126/72 mmHg  Sleep disturbance:  Difficulty falling asleep and non-restful sleep  Pain:     Pain scale: reports pain, unable to give it a number.  Treatments:     Treatment Comments:  Pt seen in therapy for s/p L TKA:    From previous d/c note: \"Ms. Alicia was seen for PT in two separate episodes following her TKA, with increase in frequency from 2 x per week to 3 x per week for the second episode of care. Unfortunately, she late cancelled and no showed many appointments, missing several appointments to make ER trips for pain management. She also had difficulty recalling and performing the HEP - mitigated with minimal improvement with handouts, simplification of HEP, and numerous attempts to involve her adult daughter. She did make some progress in ROM and gait pattern, but did not return for additional follow ups after 10/14/2020.\"   Activities of Daily Living:     Patient reported ADL status: Pt used to work as a nursing assistant or CNA. Would like to return to work part time, maybe as a  at [employer] or something. 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   Patient Goals:     Patient goals for therapy:  Increased strength, increased motion and decreased pain    Other patient goals:  Walk without stiffness, keep exercising up      Past Medical History:   Diagnosis Date   • Arrhythmia    • Arthritis     OA in knees   • ASTHMA    • Blood clotting disorder (HCC) 2018    right leg blood clot   • Dental disorder     upper and lower dentures   • Diabetes    • Emphysema of lung (HCC)    • Heart abnormality     leakage in left valve   • Heart burn     left knee   • Heart valve disease    • Hypertension    • Infection 9/4/2017   • Infection 2018    Right knee after total knee   • Liver disease    • Pneumonia 02/2020   • Seizure disorder (HCC)      Past Surgical History: "   Procedure Laterality Date   • PB TOTAL KNEE ARTHROPLASTY Left 2020    Procedure: ARTHROPLASTY, KNEE, TOTAL;  Surgeon: Víctor Faustin M.D.;  Location: SURGERY [facility];  Service: Orthopedics   • KNEE ARTHROPLASTY TOTAL Right 2018   • IRRIGATION & DEBRIDEMENT ORTHO Right 9/3/2017    Procedure: IRRIGATION & DEBRIDEMENT ORTHO, POLY EXCHANGE;  Surgeon: Jerome Russell M.D.;  Location: SURGERY [facility];  Service: Orthopedics   • COLONOSCOPY WITH CLIPPING  10/28/2015    Procedure: COLONOSCOPY WITH CLIPPING;  Surgeon: Ruben Colon M.D.;  Location: ENDOSCOPY [facility];  Service:    • COLONOSCOPY WITH SCLEROTHERAPY  10/28/2015    Procedure: COLONOSCOPY WITH SCLEROTHERAPY;  Surgeon: Ruben Colon M.D.;  Location: ENDOSCOPY [facility];  Service:    • COLONOSCOPY WITH TATTOOING  10/28/2015    Procedure: COLONOSCOPY WITH TATTOOING;  Surgeon: Ruben Colon M.D.;  Location: ENDOSCOPY [facility];  Service:    • GYN SURGERY      hysteroscoopy   • GYN SURGERY      tubal ligation     Social History     Tobacco Use   • Smoking status: Current Some Day Smoker     Types: Cigarettes     Last attempt to quit: 2016     Years since quittin.2   • Smokeless tobacco: Never Used   • Tobacco comment: a few a day when I can   Substance Use Topics   • Alcohol use: No     Family and Occupational History     Socioeconomic History   • Marital status: Single     Spouse name: Not on file   • Number of children: Not on file   • Years of education: Not on file   • Highest education level: Not on file   Occupational History   • Not on file       Objective     Observations     Additional Observation Details  Ecchymosis present on bilateral knees, L>R. Purple in color.     No open wounds/sores present on B feet. Increased nail thickness and discoloration present at B medial ankle    Neurological Testing     Sensation     Knee   Left Knee   Diminished: light touch     Right Knee  "  Diminished: light touch     Reflexes   Left   Patellar (L4): trace (1+)  Achilles (S1): trace (1+)  Clonus sign: negative    Right   Patellar (L4): trace (1+)  Achilles (S1): trace (1+)  Clonus sign: negative    Additional Neurological Details  Difficulty to thoroughly assess sensation due to difficulty following instructions. However, pt does subjectively report decreased sensation starting at the knees into her B feet which sometimes extends to her thigh.    Active Range of Motion   Left Knee   Flexion: 110 degrees   Extension: 3 (lacking 3 from 0) degrees     Right Knee   Flexion: 124 degrees   Extension: 0 degrees   Extensor la degrees     Passive Range of Motion   Left Knee   Flexion: 110 degrees   Extension: 3 degrees     Right Knee   Flexion: 124 degrees   Extension: 0 degrees     Strength:      Left Knee   Flexion: 5  Extension: 5  Quadriceps contraction: good    Right Knee   Flexion: 5  Extension: 5  Quadriceps contraction: good    Tests     Left Knee   Negative valgus stress test at 0 degrees, valgus stress test at 30 degrees, varus stress test at 0 degrees and varus stress test at 30 degrees.     Right Knee   Negative valgus stress test at 0 degrees, valgus stress test at 30 degrees, varus stress test at 0 degrees and varus stress test at 30 degrees.     Functional Assessment     Comments  Sit<>stand: able to perform without HHA from higher surface        Therapeutic Exercises (CPT 37873):     1. SKTC, 30\" x 5    2. Quad set - foot raised on towel, 5\" x5    3. Sit<>stand, 10x    4. Discussed using SPC while walking    20. UPOC: 2021      Therapeutic Exercise Summary: HEP: SKTC, quad set w/ foot raised, sit<>stand      Time-based treatments/modalities:  Physical Therapy Timed Treatment Charges  Therapeutic exercise minutes (CPT 64223): 5 minutes      Assessment, Response and Plan:   Impairments: abnormal or restricted ROM, activity intolerance, limited ADL's, limited mobility and safety issue  "   Assessment details:  Pt is a pleasant, cooperative 61 yo female who presents with L knee pain s/p TKA in August of 2020. Pt has decreased L knee ROM into flexion and extension compared to the R knee. Pt has decreased functional strength in getting out of car and up from the toilet and is at a high risk for recurrent falls given she reports falling approx 1x/week. Pt will benefit from skilled physical therapy in order to improve her L knee ROM, increase her L knee strength, improve her balance, increase her functional strength, and decrease her overall pain in order to return ADLs and iADLs with improved independence, decreased fall risk, and improved safety.  Barriers to therapy:  Cognition and comorbidities  Prognosis details:  Pt seen in PT prior but was limited in adherence to HEP due to cognition issues  Goals:   Short Term Goals:   1. Pt to start using SPC to decrease fall risk in gait  2. Pt to go to urgent care for assessment of R shoulder/chest after fall  Short term goal time span:  2-4 weeks      Long Term Goals:    1. Pt to report no falls in last 2 weeks  2. Pt is to be able to ambulate for 20 min with SPC without restriction  3. Pt is to be able to perform a sit<>stand from a lower surface without use of HHA  4. Pt is able to reach 120 deg into R knee flexion  5. Pt is able to reach 0 deg into R knee extension  Long term goal time span:  6-8 weeks    Plan:   Therapy options:  Physical therapy treatment to continue (sent to urgent care after this session for R shoulder/R rib assessment following fall)  Planned therapy interventions:  Therapeutic Exercise (CPT 03984) and Neuromuscular Re-education (CPT 37407)  Frequency:  2x week  Duration in weeks:  8  Discussed with:  Patient  Plan details:  1-2x/week for 8 weeks    2 neuro re ed (78157), 2 therapeutic exercise (95874) per session    Pt was instructed to go to urgent care after this session given her R shoulder pain with decreased shoulder ROM, R  pec/rib pain, and pain with deep breaths following her fall yesterday. Pt given HO of the address of the closest urgent care on [facility] to give to her /transportation.          Referring provider co-signature:  I have reviewed this plan of care and my co-signature certifies the need for services.    Certification Period: 03/24/2021 to  05/19/21    Physician Signature: ________________________________ Date: ______________

## 2021-04-19 ENCOUNTER — TELEPHONE (OUTPATIENT)
Dept: PHYSICAL THERAPY | Facility: REHABILITATION | Age: 61
End: 2021-04-19

## 2021-04-19 NOTE — OP THERAPY DISCHARGE SUMMARY
Outpatient Physical Therapy  DISCHARGE SUMMARY NOTE      [facility] Physical Therapy [address].  [address]  [place] [address]  Phone:  [phone]  Fax:  [phone]    Date of Visit: 04/19/2021    Patient: April Alicia  YOB: 1960  MRN: [id number]     Referring Provider: Víctor Faustin M.D.   Referring Diagnosis Bilateral primary osteoarthritis of knee [M17.0             Your patient is being discharged from Physical Therapy with the following comments:   · Pt had a fall and fractured her collar bone which is going to require surgery. Pt wishes to cancel physical therapy at this time until surgery is completed.      Limitations Remaining:  Unable to reassess    Recommendations:  Pt to d/c from this episode of care of physical therapy.    Yu Zhao, PT, DPT    Date: 4/19/2021

## 2021-04-20 ENCOUNTER — APPOINTMENT (OUTPATIENT)
Dept: PHYSICAL THERAPY | Facility: REHABILITATION | Age: 61
End: 2021-04-20
Attending: ORTHOPAEDIC SURGERY
Payer: MEDICAID

## 2021-04-23 ENCOUNTER — APPOINTMENT (OUTPATIENT)
Dept: PHYSICAL THERAPY | Facility: REHABILITATION | Age: 61
End: 2021-04-23
Attending: ORTHOPAEDIC SURGERY
Payer: MEDICAID

## 2021-04-27 ENCOUNTER — APPOINTMENT (OUTPATIENT)
Dept: PHYSICAL THERAPY | Facility: REHABILITATION | Age: 61
End: 2021-04-27
Attending: ORTHOPAEDIC SURGERY
Payer: MEDICAID

## 2021-04-30 ENCOUNTER — APPOINTMENT (OUTPATIENT)
Dept: PHYSICAL THERAPY | Facility: REHABILITATION | Age: 61
End: 2021-04-30
Attending: ORTHOPAEDIC SURGERY
Payer: MEDICAID

## 2022-01-20 ENCOUNTER — TELEPHONE (OUTPATIENT)
Dept: CARDIOLOGY | Facility: MEDICAL CENTER | Age: 62
End: 2022-01-20

## 2022-01-20 NOTE — TELEPHONE ENCOUNTER
LVM for pt to confirm NP with Kirstie CARMEN. Calling to confirm if pt has had any recent hospitalizations or cardiac work up done outside of [facility]. Office number given for call back.

## 2022-01-25 ENCOUNTER — OFFICE VISIT (OUTPATIENT)
Dept: CARDIOLOGY | Facility: MEDICAL CENTER | Age: 62
End: 2022-01-25
Payer: MEDICAID

## 2022-01-25 VITALS
SYSTOLIC BLOOD PRESSURE: 132 MMHG | DIASTOLIC BLOOD PRESSURE: 90 MMHG | HEART RATE: 66 BPM | RESPIRATION RATE: 14 BRPM | WEIGHT: 183.6 LBS | OXYGEN SATURATION: 97 % | BODY MASS INDEX: 27.83 KG/M2 | HEIGHT: 68 IN

## 2022-01-25 DIAGNOSIS — I20.89 STABLE ANGINA PECTORIS (HCC): ICD-10-CM

## 2022-01-25 DIAGNOSIS — E11.65 CONTROLLED TYPE 2 DIABETES MELLITUS WITH HYPERGLYCEMIA, WITHOUT LONG-TERM CURRENT USE OF INSULIN (HCC): ICD-10-CM

## 2022-01-25 DIAGNOSIS — R60.0 BILATERAL LOWER EXTREMITY EDEMA: ICD-10-CM

## 2022-01-25 DIAGNOSIS — I48.0 PAROXYSMAL A-FIB (HCC): ICD-10-CM

## 2022-01-25 PROBLEM — N28.9 RENAL INSUFFICIENCY SYNDROME: Status: ACTIVE | Noted: 2021-09-02

## 2022-01-25 PROBLEM — Z86.79 HISTORY OF HYPERTENSION: Status: ACTIVE | Noted: 2017-09-04

## 2022-01-25 PROBLEM — I48.91 ATRIAL FIBRILLATION (HCC): Status: ACTIVE | Noted: 2021-08-13

## 2022-01-25 PROBLEM — A41.9 SEPSIS (HCC): Status: ACTIVE | Noted: 2021-08-11

## 2022-01-25 PROCEDURE — 99204 OFFICE O/P NEW MOD 45 MIN: CPT | Performed by: NURSE PRACTITIONER

## 2022-01-25 RX ORDER — CETIRIZINE HYDROCHLORIDE 10 MG/1
10 TABLET ORAL PRN
COMMUNITY
Start: 2022-01-07 | End: 2022-07-10

## 2022-01-25 RX ORDER — LANOLIN ALCOHOL/MO/W.PET/CERES
CREAM (GRAM) TOPICAL
COMMUNITY
Start: 2022-01-07 | End: 2022-07-10

## 2022-01-25 RX ORDER — LIDOCAINE AND PRILOCAINE 25; 25 MG/G; MG/G
CREAM TOPICAL
COMMUNITY
Start: 2022-01-07 | End: 2022-07-10

## 2022-01-25 RX ORDER — ATORVASTATIN CALCIUM 20 MG/1
20 TABLET, FILM COATED ORAL NIGHTLY
Qty: 30 TABLET | Refills: 11 | Status: SHIPPED | OUTPATIENT
Start: 2022-01-25 | End: 2022-02-24 | Stop reason: SDUPTHER

## 2022-01-25 RX ORDER — IBUPROFEN 600 MG/1
600 TABLET ORAL EVERY 6 HOURS PRN
COMMUNITY
Start: 2022-01-24 | End: 2022-07-10

## 2022-01-25 RX ORDER — LANCETS
EACH MISCELLANEOUS
COMMUNITY
Start: 2022-01-07 | End: 2022-07-10

## 2022-01-25 RX ORDER — LOPERAMIDE HYDROCHLORIDE 2 MG/1
2 CAPSULE ORAL PRN
COMMUNITY
Start: 2022-01-07 | End: 2022-07-10

## 2022-01-25 RX ORDER — POTASSIUM CHLORIDE 1500 MG/1
20 TABLET, EXTENDED RELEASE ORAL DAILY
Qty: 30 TABLET | Refills: 1 | Status: SHIPPED | OUTPATIENT
Start: 2022-01-25 | End: 2022-03-29

## 2022-01-25 RX ORDER — FUROSEMIDE 20 MG/1
20 TABLET ORAL DAILY
Qty: 30 TABLET | Refills: 1 | Status: SHIPPED | OUTPATIENT
Start: 2022-01-25 | End: 2022-03-29

## 2022-01-25 RX ORDER — GINSENG 100 MG
CAPSULE ORAL
COMMUNITY
Start: 2022-01-07 | End: 2022-07-10

## 2022-01-25 ASSESSMENT — FIBROSIS 4 INDEX: FIB4 SCORE: 2.58

## 2022-01-25 NOTE — PROGRESS NOTES
Cardiology Clinic Follow-up Note    Date of note:    1/25/2022  Primary Care Provider: Juanita King N.P.    Name:             April Alicia  YOB: 1960  MRN:               [id number]    CC: Reestablish with cardiology    Primary Cardiologist: Dr Cooper    Patient HPI:   April Alicia is a 61 y.o. female with current medical problems including hypertension, diabetes, alcoholic liver cirrhosis and history of rheumatic fever At the age of 26.     Interim History:  Ms. Alicia was last seen in this cardiology office by Dr. Livingston in 2017, lost to follow-up.  During that visit she was placed on a Holter monitor for her bradycardia.    She has been seeing Dr. Dixon ([facility]), has not seen him in 1 yr. She says he diagnosed her with atrial fibrillation.    During this past summer in [place] she had complications with L knee surgery, infections, and needing skin grafts. Was in the hospital 3.5 months.    She was told she had sleep apnea, but then it improved and her machine was taken back by company.      Per patient she has stopped consuming alcohol.    She get chest pain/pressure when she cleans the house, she feels this exhausts her, also causes her to sweat and have SOB.  She does feel palpitations at times.  She has positive swelling to bilateral lower extremities, on furosemide after her discharge from the hospital in [place], ran out at the end of November and notices that her swelling is increasing.    She denies dizziness or syncopal episodes, orthopnea, PND, and recent weight gain.     Patient endorses medication compliance.  Has never been on a statin medication    Cardiovascular Risk Factors:  1. Smoking status: former, quit last year  2. Type II Diabetes Mellitus: yes  Lab Results   Component Value Date/Time    HBA1C 5.9 (H) 08/17/2021 12:11 AM    HBA1C 5.4 08/19/2020 10:23 AM    HBA1C 6.0 (H) 01/29/2020 02:02 AM     3. Hypertension: Yes  4. Dyslipidemia:    Cholesterol,Tot   Date Value Ref Range Status   09/04/2017 95 (L) 100 - 199 mg/dL Final     LDL   Date Value Ref Range Status   09/04/2017 52 <100 mg/dL Final     HDL   Date Value Ref Range Status   09/04/2017 29 (A) >=40 mg/dL Final     Triglycerides   Date Value Ref Range Status   09/04/2017 69 0 - 149 mg/dL Final     5. Family history of early Coronary Artery Disease in a first degree relative (Male less than 55 years of age; Female less than 65 years of age): no  6.  Obesity and/or Metabolic Syndrome: BMI 28  7. Sedentary lifestyle: does House chores    Review of systems:  All others systems reviewed and negative except for what is outlined in the above HPI    Past Medical History:   Diagnosis Date   • Arrhythmia    • Arthritis     OA in knees   • ASTHMA    • Blood clotting disorder (HCC) 2018    right leg blood clot   • Dental disorder     upper and lower dentures   • Diabetes    • Emphysema of lung (HCC)    • Heart abnormality     leakage in left valve   • Heart burn     left knee   • Heart valve disease    • Hypertension    • Infection 9/4/2017   • Infection 2018    Right knee after total knee   • Liver disease    • Pneumonia 02/2020   • Seizure disorder (HCC)      Past Surgical History:   Procedure Laterality Date   • PB TOTAL KNEE ARTHROPLASTY Left 8/24/2020    Procedure: ARTHROPLASTY, KNEE, TOTAL;  Surgeon: Víctor Faustin M.D.;  Location: SURGERY [facility];  Service: Orthopedics   • KNEE ARTHROPLASTY TOTAL Right 2018   • IRRIGATION & DEBRIDEMENT ORTHO Right 9/3/2017    Procedure: IRRIGATION & DEBRIDEMENT ORTHO, POLY EXCHANGE;  Surgeon: Jerome Russell M.D.;  Location: SURGERY [facility];  Service: Orthopedics   • COLONOSCOPY WITH CLIPPING  10/28/2015    Procedure: COLONOSCOPY WITH CLIPPING;  Surgeon: Ruben Colon M.D.;  Location: ENDOSCOPY [facility];  Service:    • COLONOSCOPY WITH SCLEROTHERAPY  10/28/2015    Procedure: COLONOSCOPY WITH SCLEROTHERAPY;  Surgeon: Ruben MANTILLA  DENNIS Colon;  Location: ENDOSCOPY [facility];  Service:    • COLONOSCOPY WITH TATTOOING  10/28/2015    Procedure: COLONOSCOPY WITH TATTOOING;  Surgeon: Ruben Colon M.D.;  Location: ENDOSCOPY [facility];  Service:    • GYN SURGERY      hysteroscoopy   • GYN SURGERY      tubal ligation     History reviewed. No pertinent family history.  Social History     Socioeconomic History   • Marital status: Single     Spouse name: Not on file   • Number of children: Not on file   • Years of education: Not on file   • Highest education level: Not on file   Occupational History   • Not on file   Tobacco Use   • Smoking status: Former Smoker     Types: Cigarettes     Quit date: 2016     Years since quittin.0   • Smokeless tobacco: Former User     Quit date: 2021   • Tobacco comment: a few a day when I can   Vaping Use   • Vaping Use: Never used   Substance and Sexual Activity   • Alcohol use: No   • Drug use: No   • Sexual activity: Not on file   Other Topics Concern   • Not on file   Social History Narrative   • Not on file     Social Determinants of Health     Financial Resource Strain:    • Difficulty of Paying Living Expenses: Not on file   Food Insecurity:    • Worried About Running Out of Food in the Last Year: Not on file   • Ran Out of Food in the Last Year: Not on file   Transportation Needs:    • Lack of Transportation (Medical): Not on file   • Lack of Transportation (Non-Medical): Not on file   Physical Activity:    • Days of Exercise per Week: Not on file   • Minutes of Exercise per Session: Not on file   Stress:    • Feeling of Stress : Not on file   Social Connections:    • Frequency of Communication with Friends and Family: Not on file   • Frequency of Social Gatherings with Friends and Family: Not on file   • Attends Cheondo[employer] Services: Not on file   • Active Member of Clubs or Organizations: Not on file   • Attends Club or Organization Meetings: Not on file   • Marital Status:  Not on file   Intimate Partner Violence:    • Fear of Current or Ex-Partner: Not on file   • Emotionally Abused: Not on file   • Physically Abused: Not on file   • Sexually Abused: Not on file   Housing Stability:    • Unable to Pay for Housing in the Last Year: Not on file   • Number of Places Lived in the Last Year: Not on file   • Unstable Housing in the Last Year: Not on file     Allergies   Allergen Reactions   • Nkda [No Known Drug Allergy]      Current Outpatient Medications   Medication Sig Dispense Refill   • Multiple Vitamins-Minerals (ONE-A-DAY WOMENS PO) Take  by mouth.     • diclofenac sodium (VOLTAREN) 1 % Gel      • lidocaine-prilocaine (EMLA) 2.5-2.5 % Cream      • atorvastatin (LIPITOR) 20 MG Tab Take 1 Tablet by mouth every evening. 30 Tablet 11   • furosemide (LASIX) 20 MG Tab Take 1 Tablet by mouth every day. 30 Tablet 1   • potassium chloride ER (K-TAB) 20 MEQ Tab CR tablet Take 1 Tablet by mouth every day. 30 Tablet 1   • Naloxone (NARCAN) 4 MG/0.1ML Liquid Administer a single spray of NARCAN Nasal [place] 4 mg/0.1 mL intranasally into one nostril, call 911 for emergency medical assistance. 2 Each 0   • Diclofenac Sodium (VOLTAREN) 1 % Gel Apply  to skin as directed every 7 days.     • lidocaine (LIDODERM) 5 % Patch Apply 1 Patch to skin as directed every 24 hours. Apply 1 patch to feet or wrist q 24 h prn     • ondansetron (ZOFRAN ODT) 4 MG TABLET DISPERSIBLE Take 4 mg by mouth every 6 hours as needed for Nausea.     • calcium carbonate (OS-DAILY 500) 1250 (500 Ca) MG Tab Take 500 mg by mouth every day.     • pantoprazole (PROTONIX) 40 MG Tablet Delayed Response Take 40 mg by mouth every day.     • MULTAQ 400 MG Tab Take 400 mg by mouth 2 times a day, with meals.     • felodipine (PLENDIL) 5 MG TABLET SR 24 HR Take 5 mg by mouth every evening.     • gabapentin (NEURONTIN) 400 MG Cap Take 400 mg by mouth 2 times a day.     • metFORMIN ER (GLUCOPHAGE XR) 500 MG TABLET SR 24 HR Take 500 mg by mouth  "every evening.     • tiotropium (SPIRIVA HANDIHALER) 18 MCG Cap Inhale 18 mcg by mouth every day.     • ascorbic acid (ASCORBIC ACID) 500 MG Tab Take 500 mg by mouth every day.     • apixaban (ELIQUIS) 5mg Tab Take 5 mg by mouth 2 Times a Day.     • magnesium oxide (MAG-OX) 400 MG Tab Take 400 mg by mouth every day.     • docusate sodium (COLACE) 100 MG Cap Take 100 mg by mouth 2 times a day.     • losartan (COZAAR) 100 MG Tab Take 100 mg by mouth every day.     • albuterol (VENTOLIN OR PROVENTIL) 108 (90 BASE) MCG/ACT Aero Soln inhalation aerosol Inhale 2 Puffs by mouth every 6 hours as needed for Shortness of Breath.     • oxyCODONE immediate-release (ROXICODONE) 5 MG Tab Take 5 mg by mouth every four hours as needed for Severe Pain.       No current facility-administered medications for this visit.       Physical Exam:  Ambulatory Vitals  /90 (BP Location: Left arm, Patient Position: Sitting, BP Cuff Size: Adult)   Pulse 66   Resp 14   Ht 1.715 m (5' 7.5\")   Wt 83.3 kg (183 lb 9.6 oz)   SpO2 97%    BP Readings from Last 4 Encounters:   01/25/22 132/90   10/03/20 136/85   09/15/20 145/80   09/09/20 119/55       Weight/BMI: Body mass index is 28.33 kg/m².  Wt Readings from Last 4 Encounters:   01/25/22 83.3 kg (183 lb 9.6 oz)   10/03/20 80.7 kg (178 lb)   09/13/20 84.9 kg (187 lb 2.7 oz)   09/09/20 88 kg (194 lb)       General: No apparent distress. Well nourished. [place]  Neck: No JVD. No caroid bruits, trachea midline. R carotid artery visibly pulsating, bilateral clavicles protruding  Lungs: CTAB. Normal effort, withouyt crackles/rhonchi, no wheezing  Heart: RRR, extrasystoles. Normal S1/S2, no murmur, no rub. no lower extremity edema. 2+ radial pulses, 2+ DT pulses  Ext: No clubbing or cyanosis.  Abdomen: soft, non tender, non distended, no mack hepatomegaly.  Neurological: No focal deficits, no facial asymmetry.  Normal speech.  Psychiatric: Appropriate affect, alert and oriented x 4.   Skin: Warm and " dry, no rash.    Lab Data Review:  Lab Results   Component Value Date/Time    CHOLSTRLTOT 95 (L) 2017 05:48 AM    LDL 52 2017 05:48 AM    HDL 29 (A) 2017 05:48 AM    TRIGLYCERIDE 69 2017 05:48 AM       Lab Results   Component Value Date/Time    SODIUM 138 10/03/2020 04:45 PM    POTASSIUM 4.1 10/03/2020 04:45 PM    CHLORIDE 107 10/03/2020 04:45 PM    CO2 19 (L) 10/03/2020 04:45 PM    GLUCOSE 140 (H) 10/03/2020 04:45 PM    BUN 9 10/03/2020 04:45 PM    CREATININE 0.56 10/03/2020 04:45 PM    CREATININE 0.9 2009 06:50 PM     Lab Results   Component Value Date/Time    ALKPHOSPHAT 98 2020 09:30 PM    ASTSGOT 15 2020 09:30 PM    ALTSGPT 7 2020 09:30 PM    TBILIRUBIN 0.9 2020 09:30 PM      Lab Results   Component Value Date/Time    WBC 2.7 (L) 10/03/2020 04:45 PM     Cardiac Imaging and Procedures Review:      EKG 2020: My Personal interpretation reveals SR 54,     Echo 20:  Left ventricular ejection fraction is visually estimated to be 60%.  Normal diastolic function.  Mild mitral regurgitation.  Mild tricuspid regurgitation.  Estimated right ventricular systolic pressure  is 37 mmHg.    Assessment and Clinical Decision Makin. Paroxysmal A-fib (HCC)     2. Controlled type 2 diabetes mellitus with hyperglycemia, without long-term current use of insulin ([facility])  Lipid Profile    atorvastatin (LIPITOR) 20 MG Tab   3. Stable angina pectoris (HCC)  NM-CARDIAC STRESS TEST   4. Bilateral lower extremity edema  furosemide (LASIX) 20 MG Tab    Basic Metabolic Panel    potassium chloride ER (K-TAB) 20 MEQ Tab CR tablet     The following treatment plan was discussed    Paroxysmal atrial fibrillation  -Gross by Dr. Dixon, requesting records from [facility]  -For now patient is to continue Multaq 400 mgbid and apixaban 5mg daily  -[id number] Score: 3     DM2  -Will initiate statin medication  -Atorvastatin 20 mg, medium intensity statin  -Check lipid  panel    Stable angina pectoris  -Symptoms of chest pressure, shortness of breath, diaphoresis with housework  -NM-treadmill stress test    Bilateral lower extremity edema  -Chronic problem post multiple knee and leg surgeries tear  -Furosemide 20 mg daily, increase potassium to 20 meq's daily  -Recheck BMP in 1 to 2 weeks      Plan reviewed in detail with the patient, verbalizes understanding and is in agreement.  Pt is to follow up with myself in 1 month after NM stress test has been completed.    Encouraged Pt to follow up with us over the phone or electronically using my RACTIVt as cardiac issues/concerns arise.      PLEASE NOTE: This dictation was created using voice recognition software. I have made every reasonable attempt to correct obvious errors, but I expect that there are errors of grammar and possibly content that I did not discover before finalizing the note.       EDITH Beach.   [facility]  [phone]    Collaborating Physician: Dr. Yuan.

## 2022-02-02 ENCOUNTER — APPOINTMENT (OUTPATIENT)
Dept: PHYSICAL THERAPY | Facility: REHABILITATION | Age: 62
End: 2022-02-02
Attending: PHYSICIAN ASSISTANT
Payer: MEDICAID

## 2022-02-02 ENCOUNTER — APPOINTMENT (OUTPATIENT)
Dept: RADIOLOGY | Facility: MEDICAL CENTER | Age: 62
End: 2022-02-02
Attending: NURSE PRACTITIONER
Payer: MEDICAID

## 2022-02-07 ENCOUNTER — TELEPHONE (OUTPATIENT)
Dept: CARDIOLOGY | Facility: MEDICAL CENTER | Age: 62
End: 2022-02-07

## 2022-02-09 NOTE — TELEPHONE ENCOUNTER
Called [facility], [phone], and received lab fax number from , [phone].  Attempted to fax labs, undelivered status received.  Called above number to review findings and call transferred to lab.  S/w Rodger (ext 1975) and clarified fax number.  He advised to try again and if it doesn't go through to contact him tomorrow.  2nd attempt to fax labs, undelivered status received.    Will attempt to fax at a later time.

## 2022-02-10 ENCOUNTER — APPOINTMENT (OUTPATIENT)
Dept: RADIOLOGY | Facility: MEDICAL CENTER | Age: 62
End: 2022-02-10
Attending: NURSE PRACTITIONER
Payer: MEDICAID

## 2022-02-10 NOTE — TELEPHONE ENCOUNTER
3rd attempt to fax to [facility], [phone], undelivered status received.    Called [facility], [phone], and call transferred to Lab unable to reach as call rang for 5 minutes without answer.  2nd attempt to call and was transferred to another extension to lab, unable to reach.  Left detailed voicemail and request to call this office with alternative fax number, awaiting response.

## 2022-02-14 NOTE — TELEPHONE ENCOUNTER
4th attempt to fax lab orders to [facility] and faxed to [phone], completed status received.     Fax confirmation receipt sent to [facility] to scan for reference, completed status received

## 2022-02-22 ENCOUNTER — PHYSICAL THERAPY (OUTPATIENT)
Dept: PHYSICAL THERAPY | Facility: REHABILITATION | Age: 62
End: 2022-02-22
Attending: PHYSICIAN ASSISTANT
Payer: MEDICAID

## 2022-02-22 DIAGNOSIS — R53.1 GENERALIZED WEAKNESS: ICD-10-CM

## 2022-02-22 DIAGNOSIS — R52 PAIN: ICD-10-CM

## 2022-02-22 DIAGNOSIS — M25.562 CHRONIC PAIN OF LEFT KNEE: ICD-10-CM

## 2022-02-22 DIAGNOSIS — G89.29 CHRONIC PAIN OF LEFT KNEE: ICD-10-CM

## 2022-02-22 PROCEDURE — 97163 PT EVAL HIGH COMPLEX 45 MIN: CPT

## 2022-02-22 SDOH — ECONOMIC STABILITY: GENERAL: QUALITY OF LIFE: FAIR

## 2022-02-22 ASSESSMENT — ENCOUNTER SYMPTOMS
PAIN SCALE: 10
PAIN SCALE AT HIGHEST: 10
PAIN SCALE AT LOWEST: 7

## 2022-02-22 ASSESSMENT — ACTIVITIES OF DAILY LIVING (ADL): POOR_BALANCE: 1

## 2022-02-22 NOTE — OP THERAPY EVALUATION
Outpatient Physical Therapy  INITIAL EVALUATION    [facility] [address]  [address]  [facility] [address]  Phone:  [phone]  Fax:  [phone]    Date of Evaluation: 2022    Patient: April Alicia  YOB: 1960  MRN: [id number]     Referring Provider: Karen Mcclure P.A.-C.  [address] [place] [address]   Referring Diagnosis Pain, unspecified [R52]     Time Calculation  Start time: 810  Stop time: 0845 Time Calculation (min): 35 minutes         Chief Complaint: Knee Problem, Post-Op Pain, Difficulty Walking, and Loss Of Balance    Visit Diagnoses     ICD-10-CM   1. Pain  R52   2. Chronic pain of left knee  M25.562    G89.29   3. Generalized weakness  R53.1       Date of onset of impairment: 2022    Subjective:   History of Present Illness:     Mechanism of injury:  Pt states that she fell out of bed and broke her R hand. She states that she had surgery on her L knee, a TKA in 2021. She never have PT after. She never got a call for her therapy. Her R knee was replaced several years ago. She states that she had blood poisoning in her L leg that spread to her R leg and she has bilat skin grafts. She no longer required wound care. She can't bend her knee and it wasn't to buckle. She almost falls often. She has fallen 4 times in the last year b/c her knee robbin. She has a lot of pn in her knee and hand. Her knee won't straighten. She can walk 1 block but has to stop and rest. She uses a cane all the time. She states she stays w/ her boyfriend a lot who has 12 stairs. She has 4 stairs to enter her single story home with her daughter where her primary residency is.   Quality of life:  Fair  Pain:     Current pain rating:  10    At best pain ratin    At worst pain rating:  10  Patient Goals:     Other patient goals:  Get strong to use knee. no more buckling      Past Medical History:   Diagnosis Date   • Arrhythmia    • Arthritis      OA in knees   • ASTHMA    • Blood clotting disorder (HCC) 2018    right leg blood clot   • Dental disorder     upper and lower dentures   • Diabetes    • Emphysema of lung (HCC)    • Heart abnormality     leakage in left valve   • Heart burn     left knee   • Heart valve disease    • Hypertension    • Infection 2017   • Infection 2018    Right knee after total knee   • Liver disease    • Pneumonia 2020   • Seizure disorder (HCC)      Past Surgical History:   Procedure Laterality Date   • PB TOTAL KNEE ARTHROPLASTY Left 2020    Procedure: ARTHROPLASTY, KNEE, TOTAL;  Surgeon: Víctor Faustin M.D.;  Location: SURGERY [facility];  Service: Orthopedics   • KNEE ARTHROPLASTY TOTAL Right 2018   • IRRIGATION & DEBRIDEMENT ORTHO Right 9/3/2017    Procedure: IRRIGATION & DEBRIDEMENT ORTHO, POLY EXCHANGE;  Surgeon: Jerome Russell M.D.;  Location: SURGERY [facility];  Service: Orthopedics   • COLONOSCOPY WITH CLIPPING  10/28/2015    Procedure: COLONOSCOPY WITH CLIPPING;  Surgeon: Ruben Colon M.D.;  Location: ENDOSCOPY [facility];  Service:    • COLONOSCOPY WITH SCLEROTHERAPY  10/28/2015    Procedure: COLONOSCOPY WITH SCLEROTHERAPY;  Surgeon: Ruben Colon M.D.;  Location: ENDOSCOPY [facility];  Service:    • COLONOSCOPY WITH TATTOOING  10/28/2015    Procedure: COLONOSCOPY WITH TATTOOING;  Surgeon: Ruben Colon M.D.;  Location: [facility];  Service:    • GYN SURGERY      hysteroscoopy   • GYN SURGERY      tubal ligation     Social History     Tobacco Use   • Smoking status: Former Smoker     Types: Cigarettes     Quit date: 2016     Years since quittin.1   • Smokeless tobacco: Former User     Quit date: 2021   • Tobacco comment: a few a day when I can   Substance Use Topics   • Alcohol use: No     Family and Occupational History     Socioeconomic History   • Marital status: Single     Spouse name: Not on file   • Number of children: Not on  "file   • Years of education: Not on file   • Highest education level: Not on file   Occupational History   • Not on file       Objective     General Comments     Knee Comments  Knee AROM:  Flex: L 115, R 120  Ext: L 0, R 0    MMT: gloablly 3+/5 bilat    Sig muscle atrophy on L>R LE greatest in quads and gastroc/soleus     5x sit to stand: 18 sec w/ UE assist    Mod bruising around L knee, states from recent fall    Narrow stance: WNL    Mod tandem stance: WNL  TU\" w/ SPC    Skin graphs bilat from mid shin down to ankle, grafts taken from ant thigh. Wears compression sock over graft. Sig edema noted around bilat ankles.    Objective assessment sig limited d/t hearing deficits           Therapeutic Exercises (CPT 71663):     2. SLR    3. LAQ    4. Bridge hold    5. Sit to stand    19. POC: 22      Therapeutic Exercise Summary: Access Code: [email or web address]  URL: [email or web address]  Date: 2022  Prepared by: Dolores Burrell    Exercises  Supine Bridge - 2 x daily - 7 x weekly - 10 reps - 10 seconds hold  Supine Active Straight Leg Raise - 2 x daily - 7 x weekly - 3 sets - 10 reps  Seated Long Arc Quad - 2 x daily - 7 x weekly - 2 reps - 1 minute hold  Sit to Stand Without Arm Support - 2 x daily - 7 x weekly - 3 sets - 10 reps        Time-based treatments/modalities:           Assessment, Response and Plan:   Impairments: abnormal ADL function, abnormal gait, abnormal muscle tone, abnormal or restricted ROM, activity intolerance, impaired functional mobility, impaired balance, impaired physical strength, lacks appropriate home exercise program, limited ADL's, limited mobility, pain with function, safety issue and swelling    Assessment details:  Ms. Alicia is a 62 y/o female who presents in PT w/ s/s consistent w/ possible global LE power coordination deficits that place her at a high fall risk. She presents w/ deficits in sig LE weakness, poor endurance, dec'd gait speed, frequent falls, " constant pain, and poor endurance that are preventing her from walking >1 block. Pt was provided HEP and educated in benefits of strengthening to fatigue to build strength. She was advised to use SPC at all times. Evaluation was limited d/t sig hearing deficits. Pt will cont to benefit from PT at this time in order to address her functional limitations and help her reach her functional goals.     Barriers to therapy:  Comorbidities, hearing and transportation  Prognosis: good    Goals:   Short Term Goals:   Pt will be able to walk 2 blocks w/o rest  Pt will demo good compliance to HEP  Pt will demo 5x sit to  < 14 sec  Short term goal time span:  4-6 weeks      Long Term Goals:    Pt will be able to walk for 20 min w/o rest break w/ SPC  Pt will demo TUG score in less than 13 sec w/ SPC  Pt will demo global 4+/5 LE MMT  Pt will report no fall in last month and improved subjective balance     Plan:   Therapy options:  Physical therapy treatment to continue (advised to receive referral for Occupational Therapy for R hand/wrist)  Planned therapy interventions:  Neuromuscular Re-education (CPT 80939) and Therapeutic Exercise (CPT 37832)  Frequency:  2x week  Duration in weeks:  12  Duration in visits:  16  Discussed with:  Patient  Plan details:  Pt very hard of hearing, speak loudly in R ear  Focus on global strength training to improve endurance. Dynamic balance training   Pt had bilat skin grafts on lower legs      Referring provider co-signature:  I have reviewed this plan of care and my co-signature certifies the need for services.    Certification Period: 02/22/2022 to  05/17/22    Physician Signature: ________________________________ Date: ______________

## 2022-02-24 ENCOUNTER — APPOINTMENT (OUTPATIENT)
Dept: PHYSICAL THERAPY | Facility: REHABILITATION | Age: 62
End: 2022-02-24
Attending: PHYSICIAN ASSISTANT
Payer: MEDICAID

## 2022-02-24 ENCOUNTER — OFFICE VISIT (OUTPATIENT)
Dept: CARDIOLOGY | Facility: MEDICAL CENTER | Age: 62
End: 2022-02-24
Payer: MEDICAID

## 2022-02-24 VITALS
WEIGHT: 175.2 LBS | RESPIRATION RATE: 14 BRPM | OXYGEN SATURATION: 97 % | HEART RATE: 53 BPM | HEIGHT: 68 IN | DIASTOLIC BLOOD PRESSURE: 90 MMHG | BODY MASS INDEX: 26.55 KG/M2 | SYSTOLIC BLOOD PRESSURE: 146 MMHG

## 2022-02-24 DIAGNOSIS — E11.65 CONTROLLED TYPE 2 DIABETES MELLITUS WITH HYPERGLYCEMIA, WITHOUT LONG-TERM CURRENT USE OF INSULIN (HCC): ICD-10-CM

## 2022-02-24 DIAGNOSIS — E11.42 DIABETIC POLYNEUROPATHY ASSOCIATED WITH TYPE 2 DIABETES MELLITUS (HCC): ICD-10-CM

## 2022-02-24 DIAGNOSIS — I20.89 STABLE ANGINA PECTORIS (HCC): ICD-10-CM

## 2022-02-24 DIAGNOSIS — I48.0 PAROXYSMAL ATRIAL FIBRILLATION (HCC): ICD-10-CM

## 2022-02-24 DIAGNOSIS — Z79.899 HIGH RISK MEDICATION USE: ICD-10-CM

## 2022-02-24 LAB — EKG IMPRESSION: NORMAL

## 2022-02-24 PROCEDURE — 99214 OFFICE O/P EST MOD 30 MIN: CPT | Performed by: NURSE PRACTITIONER

## 2022-02-24 PROCEDURE — 93000 ELECTROCARDIOGRAM COMPLETE: CPT | Performed by: INTERNAL MEDICINE

## 2022-02-24 RX ORDER — ACETAMINOPHEN 325 MG/1
325 TABLET ORAL PRN
COMMUNITY
Start: 2022-02-08 | End: 2022-07-10

## 2022-02-24 RX ORDER — ATORVASTATIN CALCIUM 20 MG/1
20 TABLET, FILM COATED ORAL NIGHTLY
Qty: 90 TABLET | Refills: 3 | Status: ON HOLD | OUTPATIENT
Start: 2022-02-24 | End: 2022-12-27

## 2022-02-24 RX ORDER — POTASSIUM CHLORIDE 20 MEQ/1
TABLET, EXTENDED RELEASE ORAL
COMMUNITY
End: 2022-02-24

## 2022-02-24 RX ORDER — DIAPER,BRIEF,INFANT-TODD,DISP
EACH MISCELLANEOUS PRN
COMMUNITY
End: 2022-07-10

## 2022-02-24 RX ORDER — PANTOPRAZOLE SODIUM 20 MG/1
TABLET, DELAYED RELEASE ORAL
COMMUNITY
Start: 2022-02-08 | End: 2022-02-24

## 2022-02-24 ASSESSMENT — FIBROSIS 4 INDEX: FIB4 SCORE: 2.58

## 2022-02-24 NOTE — PROGRESS NOTES
"      Cardiology Clinic Follow-up Note    Date of note:    2/24/2022  Primary Care Provider: Juanita King N.P.    Name:             April Alicia  YOB: 1960  MRN:               [id number]    CC: Reestablish with cardiology    Primary Cardiologist: Dr Dixon    Patient HPI:   April Alicai is a 61 y.o. female with current medical problems including hypertension, diabetes, alcoholic liver cirrhosis and history of rheumatic fever At the age of 26.     Interim History:  Pt presents today still feeling liker her \"heart hurts\" and feeling chest pressure.  She did not schedule her nuclear medicine stress test that was ordered at her last appointment.  She also feels much more tired today than usual.    She denies palpitations, shortness of breath, dyspnea on exertion, dizziness or syncopal episodes, orthopnea, PND, lower extremity swelling, and recent weight gain.     Per last office visit note on 1/25/22  Ms. Alicia was last seen in this cardiology office by Dr. Livingston in 2017, lost to follow-up.  During that visit she was placed on a Holter monitor for her bradycardia.    She has been seeing Dr. Dixon ([facility]), has not seen him in 1 yr. She says he diagnosed her with atrial fibrillation.    During this past summer in [place] she had complications with L knee surgery, infections, and needing skin grafts. Was in the hospital 3.5 months.    She was told she had sleep apnea, but then it improved and her machine was taken back by company.      Per patient she has stopped consuming alcohol.    She get chest pain/pressure when she cleans the house, she feels this exhausts her, also causes her to sweat and have SOB.  She does feel palpitations at times.  She has positive swelling to bilateral lower extremities, on furosemide after her discharge from the hospital in [place], ran out at the end of November and notices that her swelling is increasing.    She denies dizziness or " syncopal episodes, orthopnea, PND, and recent weight gain.     Patient endorses medication compliance.  Has never been on a statin medication    Cardiovascular Risk Factors:  1. Smoking status: former, quit last year  2. Type II Diabetes Mellitus: yes  Lab Results   Component Value Date/Time    HBA1C 5.9 (H) 08/17/2021 12:11 AM    HBA1C 5.4 08/19/2020 10:23 AM    HBA1C 6.0 (H) 01/29/2020 02:02 AM     3. Hypertension: Yes  4. Dyslipidemia:   Cholesterol,Tot   Date Value Ref Range Status   09/04/2017 95 (L) 100 - 199 mg/dL Final     LDL   Date Value Ref Range Status   09/04/2017 52 <100 mg/dL Final     HDL   Date Value Ref Range Status   09/04/2017 29 (A) >=40 mg/dL Final     Triglycerides   Date Value Ref Range Status   09/04/2017 69 0 - 149 mg/dL Final     5. Family history of early Coronary Artery Disease in a first degree relative (Male less than 55 years of age; Female less than 65 years of age): no  6.  Obesity and/or Metabolic Syndrome: BMI 28  7. Sedentary lifestyle: does House chores    Review of systems:  All others systems reviewed and negative except for what is outlined in the above HPI    Past Medical History:   Diagnosis Date   • Arrhythmia    • Arthritis     OA in knees   • ASTHMA    • Blood clotting disorder (HCC) 2018    right leg blood clot   • Dental disorder     upper and lower dentures   • Diabetes    • Emphysema of lung (HCC)    • Heart abnormality     leakage in left valve   • Heart burn     left knee   • Heart valve disease    • Hypertension    • Infection 9/4/2017   • Infection 2018    Right knee after total knee   • Liver disease    • Pneumonia 02/2020   • Seizure disorder (HCC)      Past Surgical History:   Procedure Laterality Date   • PB TOTAL KNEE ARTHROPLASTY Left 8/24/2020    Procedure: ARTHROPLASTY, KNEE, TOTAL;  Surgeon: Víctor Faustin M.D.;  Location: SURGERY [facility];  Service: Orthopedics   • KNEE ARTHROPLASTY TOTAL Right 2018   • IRRIGATION & DEBRIDEMENT ORTHO Right  9/3/2017    Procedure: IRRIGATION & DEBRIDEMENT ORTHO, POLY EXCHANGE;  Surgeon: Jerome Russell M.D.;  Location: SURGERY [facility];  Service: Orthopedics   • COLONOSCOPY WITH CLIPPING  10/28/2015    Procedure: COLONOSCOPY WITH CLIPPING;  Surgeon: Ruben Colon M.D.;  Location: ENDOSCOPY [facility];  Service:    • COLONOSCOPY WITH SCLEROTHERAPY  10/28/2015    Procedure: COLONOSCOPY WITH SCLEROTHERAPY;  Surgeon: Ruben Colon M.D.;  Location: ENDOSCOPY [facility];  Service:    • COLONOSCOPY WITH TATTOOING  10/28/2015    Procedure: COLONOSCOPY WITH TATTOOING;  Surgeon: Ruben Colon M.D.;  Location: ENDOSCOPY [facility];  Service:    • GYN SURGERY      hysteroscoopy   • GYN SURGERY      tubal ligation     History reviewed. No pertinent family history.  Social History     Socioeconomic History   • Marital status: Single     Spouse name: Not on file   • Number of children: Not on file   • Years of education: Not on file   • Highest education level: Not on file   Occupational History   • Not on file   Tobacco Use   • Smoking status: Former Smoker     Types: Cigarettes     Quit date: 2016     Years since quittin.1   • Smokeless tobacco: Former User     Quit date: 2021   • Tobacco comment: a few a day when I can   Vaping Use   • Vaping Use: Never used   Substance and Sexual Activity   • Alcohol use: No   • Drug use: No   • Sexual activity: Not on file   Other Topics Concern   • Not on file   Social History Narrative   • Not on file     Social Determinants of Health     Financial Resource Strain: Not on file   Food Insecurity: Not on file   Transportation Needs: Not on file   Physical Activity: Not on file   Stress: Not on file   Social Connections: Not on file   Intimate Partner Violence: Not on file   Housing Stability: Not on file     Allergies   Allergen Reactions   • Nkda [No Known Drug Allergy]      Current Outpatient Medications   Medication Sig Dispense Refill   •  acetaminophen (TYLENOL) 325 MG Tab Take 325 mg by mouth as needed.     • hydrocortisone 1 % Ointment Apply  topically as needed.     • atorvastatin (LIPITOR) 20 MG Tab Take 1 Tablet by mouth every evening. 90 Tablet 3   • Multiple Vitamins-Minerals (ONE-A-DAY WOMENS PO) Take  by mouth.     • lidocaine-prilocaine (EMLA) 2.5-2.5 % Cream      • furosemide (LASIX) 20 MG Tab Take 1 Tablet by mouth every day. 30 Tablet 1   • Skin Protectants, Misc. (MINERIN CREME) Cream      • loperamide (IMODIUM) 2 MG Cap Take 2 mg by mouth as needed.     • TechLite Lancets Misc      • ibuprofen (MOTRIN) 600 MG Tab Take 600 mg by mouth every 6 hours as needed.     • cetirizine (ZYRTEC) 10 MG Tab Take 10 mg by mouth as needed.     • bacitracin 500 UNIT/GM ointment      • Naloxone (NARCAN) 4 MG/0.1ML Liquid Administer a single spray of NARCAN Nasal [place] 4 mg/0.1 mL intranasally into one nostril, call 911 for emergency medical assistance. 2 Each 0   • Diclofenac Sodium 1 % Gel Apply  to skin as directed every 7 days.     • lidocaine (LIDODERM) 5 % Patch Place 1 Patch on the skin every 24 hours. Apply 1 patch to feet or wrist q 24 h prn     • ondansetron (ZOFRAN ODT) 4 MG TABLET DISPERSIBLE Take 4 mg by mouth every 6 hours as needed for Nausea.     • calcium carbonate (OS-DAILY 500) 1250 (500 Ca) MG Tab Take 500 mg by mouth every day.     • pantoprazole (PROTONIX) 40 MG Tablet Delayed Response Take 40 mg by mouth every day.     • MULTAQ 400 MG Tab Take 400 mg by mouth 2 times a day, with meals.     • felodipine (PLENDIL) 5 MG TABLET SR 24 HR Take 5 mg by mouth every evening.     • gabapentin (NEURONTIN) 400 MG Cap Take 400 mg by mouth 2 times a day.     • metFORMIN ER (GLUCOPHAGE XR) 500 MG TABLET SR 24 HR Take 500 mg by mouth every evening.     • tiotropium (SPIRIVA HANDIHALER) 18 MCG Cap Inhale 18 mcg by mouth every day.     • ascorbic acid (ASCORBIC ACID) 500 MG Tab Take 500 mg by mouth every day.     • apixaban (ELIQUIS) 5mg Tab Take 5 mg  "by mouth 2 Times a Day.     • magnesium oxide (MAG-OX) 400 MG Tab Take 400 mg by mouth every day.     • losartan (COZAAR) 100 MG Tab Take 100 mg by mouth every day.     • albuterol (VENTOLIN OR PROVENTIL) 108 (90 BASE) MCG/ACT Aero Soln inhalation aerosol Inhale 2 Puffs by mouth every 6 hours as needed for Shortness of Breath.     • potassium chloride ER (K-TAB) 20 MEQ Tab CR tablet Take 1 Tablet by mouth every day. 30 Tablet 1   • Calcium Carb-Cholecalciferol (OYSTER SHELL CALCIUM W/D) 500-200 MG-UNIT Tab  (Patient not taking: Reported on 2/24/2022)       No current facility-administered medications for this visit.       Physical Exam:  Ambulatory Vitals  /90 (BP Location: Left arm, Patient Position: Sitting, BP Cuff Size: Adult)   Pulse (!) 53   Resp 14   Ht 1.715 m (5' 7.5\")   Wt 79.5 kg (175 lb 3.2 oz)   SpO2 97%    BP Readings from Last 4 Encounters:   02/24/22 146/90   01/25/22 132/90   10/03/20 136/85   09/15/20 145/80       Weight/BMI: Body mass index is 27.04 kg/m².  Wt Readings from Last 4 Encounters:   02/24/22 79.5 kg (175 lb 3.2 oz)   01/25/22 83.3 kg (183 lb 9.6 oz)   10/03/20 80.7 kg (178 lb)   09/13/20 84.9 kg (187 lb 2.7 oz)     General: No apparent distress. Well nourished. [place]  Neck: No JVD. No caroid bruits, trachea midline. R carotid artery visibly pulsating, bilateral clavicles protruding  Lungs: CTAB. Normal effort, withouyt crackles/rhonchi, no wheezing  Heart: Bradycardia.  Normal S1/S2, no murmur, no rub. no lower extremity edema. 2+ radial pulses, 2+ DT pulses  Ext: No clubbing or cyanosis.  Abdomen: soft, non tender, non distended, no mack hepatomegaly.  Neurological: No focal deficits, no facial asymmetry.  Normal speech.  Psychiatric: Appropriate affect, alert and oriented x 4.   Skin: Warm and dry, no rash.    Lab Data Review:  Lab Results   Component Value Date/Time    CHOLSTRLTOT 95 (L) 09/04/2017 05:48 AM    LDL 52 09/04/2017 05:48 AM    HDL 29 (A) 09/04/2017 05:48 AM    " TRIGLYCERIDE 69 2017 05:48 AM       Lab Results   Component Value Date/Time    SODIUM 138 10/03/2020 04:45 PM    POTASSIUM 4.1 10/03/2020 04:45 PM    CHLORIDE 107 10/03/2020 04:45 PM    CO2 19 (L) 10/03/2020 04:45 PM    GLUCOSE 140 (H) 10/03/2020 04:45 PM    BUN 9 10/03/2020 04:45 PM    CREATININE 0.56 10/03/2020 04:45 PM    CREATININE 0.9 2009 06:50 PM     Lab Results   Component Value Date/Time    ALKPHOSPHAT 98 2020 09:30 PM    ASTSGOT 15 2020 09:30 PM    ALTSGPT 7 2020 09:30 PM    TBILIRUBIN 0.9 2020 09:30 PM      Lab Results   Component Value Date/Time    WBC 2.7 (L) 10/03/2020 04:45 PM     Cardiac Imaging and Procedures Review:      EKG 2020: My Personal interpretation reveals SR 54  EKG: My Personal interpretation reveals SB 49    Echo 20:  Left ventricular ejection fraction is visually estimated to be 60%.  Normal diastolic function.  Mild mitral regurgitation.  Mild tricuspid regurgitation.  Estimated right ventricular systolic pressure  is 37 mmHg.    Assessment and Clinical Decision Makin. Controlled type 2 diabetes mellitus with hyperglycemia, without long-term current use of insulin (HCC)  HEMOGLOBIN A1C (Glycohemoglobin GHB Total/A1C with MBG Estimate)    atorvastatin (LIPITOR) 20 MG Tab   2. High risk medication use  EKG    Comp Metabolic Panel     The following treatment plan was discussed    Paroxysmal atrial fibrillation  -diagnosed by Dr. Dixon, records not obtained from [facility] after last office visit  -continue Multaq 400 mgbid and apixaban 5mg daily  -If patient remains bradycardic and symptomatic with fatigue we can discuss stopping this medication  -[id number] Score: 3     DM2  -Continue atorvastatin 20 mg, medium intensity statin  -Check lipid panel, did not get checked after last visit    Stable angina pectoris  -Symptoms of chest pressure, shortness of breath, diaphoresis with housework  -Did not obtain her NM-treadmill  stress test after last visit, will schedule this today    Bilateral lower extremity edema  -Chronic problem post multiple knee and leg surgeries tear  -Furosemide 20 mg daily, increase potassium to 20 meq's daily  -Recheck CMP, reprinted labs for patient     Plan reviewed in detail with the patient, verbalizes understanding and is in agreement.  Pt is to follow up with myself in 1 month after NM stress test has been completed.     Encouraged Pt to follow up with us over the phone or electronically using my [employer] as cardiac issues/concerns arise.      PLEASE NOTE: This dictation was created using voice recognition software. I have made every reasonable attempt to correct obvious errors, but I expect that there are errors of grammar and possibly content that I did not discover before finalizing the note.       JAMEE BeachRELEANOR.   [facility]  [phone]    Collaborating Physician: Dr. Boston

## 2022-03-01 ENCOUNTER — APPOINTMENT (OUTPATIENT)
Dept: PHYSICAL THERAPY | Facility: REHABILITATION | Age: 62
End: 2022-03-01
Attending: PHYSICIAN ASSISTANT
Payer: MEDICAID

## 2022-03-01 DIAGNOSIS — Z79.899 HIGH RISK MEDICATION USE: ICD-10-CM

## 2022-03-03 ENCOUNTER — APPOINTMENT (OUTPATIENT)
Dept: PHYSICAL THERAPY | Facility: REHABILITATION | Age: 62
End: 2022-03-03
Attending: PHYSICIAN ASSISTANT
Payer: MEDICAID

## 2022-03-03 ENCOUNTER — TELEPHONE (OUTPATIENT)
Dept: CARDIOLOGY | Facility: MEDICAL CENTER | Age: 62
End: 2022-03-03
Payer: MEDICAID

## 2022-03-03 NOTE — LETTER
"March 3, 2022        April Alicia  [address] [address]          Dear April,    We have received the results of your recent: Lab Draw    Your test came back and Nurse Practitioner Kirstie Whipple states, \"Labs look good.  We can go over them at her appointment.\"      Please keep your follow up visit with Kirstie 4/4/2022.  Feel free to call us with any questions.        Sincerely,          Arlette FUENTES  Electronically Signed  "

## 2022-03-03 NOTE — TELEPHONE ENCOUNTER
----- Message from DANYELL Beach sent at 3/2/2022  3:07 PM PST -----  Labs look good. We can go over them at her appt.

## 2022-03-10 NOTE — THERAPY
"Occupational Therapy Treatment completed with focus on ADLs, ADL transfers and patient education.  Functional Status:  Pt seen for OT tx. Supervision supine to sit, supervision sit to stand. Pt tolerated amb to BR w/ FWW and supervision. Pt completed toilet hygiene and toilet transfer w/ supervision. Pt stood at sink to complete light grooming w/ supervision. Pt pleasant and cooperative during session. Pt accepting to education about safety and accepting to education regarding possible DME/equipment needs for home to assist w/ ADLs and ADL transfers.  Plan of Care: Will benefit from Occupational Therapy 3 times per week  Discharge Recommendations:  Equipment Will Continue to Assess for Equipment Needs.    Pt continues to require antibiotic tx upon appropriate medical d/c for extended amount of time. Pt planning to d/c to SNF for IV antibiotic tx.     See \"Rehab Therapy-Acute\" Patient Summary Report for complete documentation.   " Lot #: [id number] Lot #: [id number]

## 2022-03-24 ENCOUNTER — APPOINTMENT (OUTPATIENT)
Dept: PHYSICAL THERAPY | Facility: REHABILITATION | Age: 62
End: 2022-03-24
Attending: PHYSICIAN ASSISTANT
Payer: MEDICAID

## 2022-03-27 DIAGNOSIS — R60.0 BILATERAL LOWER EXTREMITY EDEMA: ICD-10-CM

## 2022-03-29 RX ORDER — POTASSIUM CHLORIDE 1500 MG/1
20 TABLET, EXTENDED RELEASE ORAL DAILY
Qty: 90 TABLET | Refills: 3 | Status: ON HOLD | OUTPATIENT
Start: 2022-03-29 | End: 2022-12-27

## 2022-03-29 RX ORDER — FUROSEMIDE 20 MG/1
20 TABLET ORAL DAILY
Qty: 90 TABLET | Refills: 3 | Status: ON HOLD | OUTPATIENT
Start: 2022-03-29 | End: 2022-12-27

## 2022-03-31 ENCOUNTER — TELEPHONE (OUTPATIENT)
Dept: CARDIOLOGY | Facility: MEDICAL CENTER | Age: 62
End: 2022-03-31
Payer: MEDICAID

## 2022-03-31 NOTE — TELEPHONE ENCOUNTER
Left VM in regards to lab work and stress test. Called to confirm if patient was able to have work up done before appt with Kirstie CARMEN on 04/04/22 at 1345.

## 2022-04-12 ENCOUNTER — TELEPHONE (OUTPATIENT)
Dept: PHYSICAL THERAPY | Facility: REHABILITATION | Age: 62
End: 2022-04-12
Payer: MEDICAID

## 2022-04-12 NOTE — OP THERAPY DISCHARGE SUMMARY
Outpatient Physical Therapy  DISCHARGE SUMMARY NOTE      [facility] [address]  [address]  [place] [address]  Phone:  [phone]  Fax:  [phone]    Date of Visit: 04/12/2022    Patient: April Alicia  YOB: 1960  MRN: [id number]     Referring Provider: Karen Mcclure P.A.-C.   Referring Diagnosis Pain, unspecified [R52]       Your patient is being discharged from Physical Therapy with the following comments:   · Patient cancelled or missed more than 2 scheduled appointments/non-compliant    Comments:  Pt is self discharged at this time d/t our late cancel or no show policy. They were advised in eval to cont compliance to HEP and contact PT as needed w/ any questions or concerns. Pt may return to PT in future as needed w/ new referral and check in w/referring provider.      Dolores Burrell, PT    Date: 4/12/2022

## 2022-06-16 NOTE — PROGRESS NOTES
For medication: levothyroxine 88 MCG tablet    90 Day supply requested? Yes    Comments: in script bin     Pharmacy loaded below: Yes     Patient arrived to unit from ED.  A&O*4, VSS, CMS to LLE intact, patient ambulated to bathroom from [facility] and to bed with standby assist and FWW, pain to LLE 8/10.    2 Rn skin check complete with Crystal MONTAÑO.  Left knee old surgical incision red, hot and tender to touch.  Bruise to right forearm.  No other skin issues noted.

## 2022-07-10 ENCOUNTER — APPOINTMENT (OUTPATIENT)
Dept: RADIOLOGY | Facility: MEDICAL CENTER | Age: 62
DRG: 466 | End: 2022-07-10
Attending: EMERGENCY MEDICINE
Payer: MEDICAID

## 2022-07-10 ENCOUNTER — HOSPITAL ENCOUNTER (INPATIENT)
Facility: MEDICAL CENTER | Age: 62
LOS: 25 days | DRG: 466 | End: 2022-08-04
Attending: EMERGENCY MEDICINE | Admitting: STUDENT IN AN ORGANIZED HEALTH CARE EDUCATION/TRAINING PROGRAM
Payer: MEDICAID

## 2022-07-10 DIAGNOSIS — E43 PROTEIN-CALORIE MALNUTRITION, SEVERE (HCC): ICD-10-CM

## 2022-07-10 DIAGNOSIS — K70.30 ALCOHOLIC CIRRHOSIS OF LIVER WITHOUT ASCITES (HCC): ICD-10-CM

## 2022-07-10 DIAGNOSIS — M15.4 EROSIVE OSTEOARTHRITIS OF RIGHT HAND: ICD-10-CM

## 2022-07-10 DIAGNOSIS — M00.9 PYOGENIC ARTHRITIS OF LEFT KNEE JOINT, DUE TO UNSPECIFIED ORGANISM (HCC): ICD-10-CM

## 2022-07-10 DIAGNOSIS — E11.42 DIABETIC POLYNEUROPATHY ASSOCIATED WITH TYPE 2 DIABETES MELLITUS (HCC): ICD-10-CM

## 2022-07-10 DIAGNOSIS — E55.9 VITAMIN D DEFICIENCY: ICD-10-CM

## 2022-07-10 DIAGNOSIS — G93.41 METABOLIC ENCEPHALOPATHY: ICD-10-CM

## 2022-07-10 DIAGNOSIS — A40.1: ICD-10-CM

## 2022-07-10 DIAGNOSIS — N17.9 ACUTE RENAL FAILURE, UNSPECIFIED ACUTE RENAL FAILURE TYPE (HCC): ICD-10-CM

## 2022-07-10 DIAGNOSIS — D61.811 DRUG-INDUCED PANCYTOPENIA (HCC): ICD-10-CM

## 2022-07-10 DIAGNOSIS — E11.65 CONTROLLED TYPE 2 DIABETES MELLITUS WITH HYPERGLYCEMIA, WITHOUT LONG-TERM CURRENT USE OF INSULIN (HCC): Chronic | ICD-10-CM

## 2022-07-10 DIAGNOSIS — I48.0 PAROXYSMAL ATRIAL FIBRILLATION (HCC): ICD-10-CM

## 2022-07-10 DIAGNOSIS — G93.41: ICD-10-CM

## 2022-07-10 DIAGNOSIS — D68.9 COAGULOPATHY (HCC): ICD-10-CM

## 2022-07-10 DIAGNOSIS — R78.81 BACTEREMIA: ICD-10-CM

## 2022-07-10 DIAGNOSIS — E87.1 HYPONATREMIA: ICD-10-CM

## 2022-07-10 DIAGNOSIS — B19.20 HEPATITIS C VIRUS INFECTION WITHOUT HEPATIC COMA, UNSPECIFIED CHRONICITY: ICD-10-CM

## 2022-07-10 DIAGNOSIS — N28.9 RENAL INSUFFICIENCY SYNDROME: ICD-10-CM

## 2022-07-10 DIAGNOSIS — D50.9 IRON DEFICIENCY ANEMIA, UNSPECIFIED IRON DEFICIENCY ANEMIA TYPE: ICD-10-CM

## 2022-07-10 DIAGNOSIS — B18.2 CHRONIC HEPATITIS C WITHOUT HEPATIC COMA (HCC): ICD-10-CM

## 2022-07-10 DIAGNOSIS — M00.9 PYOGENIC ARTHRITIS OF LEFT SHOULDER REGION, DUE TO UNSPECIFIED ORGANISM (HCC): ICD-10-CM

## 2022-07-10 DIAGNOSIS — Z86.79 HISTORY OF RHEUMATIC FEVER: ICD-10-CM

## 2022-07-10 DIAGNOSIS — R65.20: ICD-10-CM

## 2022-07-10 PROBLEM — E78.5 HYPERLIPIDEMIA: Status: ACTIVE | Noted: 2022-07-10

## 2022-07-10 LAB
ALBUMIN SERPL BCP-MCNC: 2.6 G/DL (ref 3.2–4.9)
ALBUMIN/GLOB SERPL: 0.7 G/DL
ALP SERPL-CCNC: 97 U/L (ref 30–99)
ALT SERPL-CCNC: 40 U/L (ref 2–50)
AMPHET UR QL SCN: NEGATIVE
ANION GAP SERPL CALC-SCNC: 12 MMOL/L (ref 7–16)
ANION GAP SERPL CALC-SCNC: 16 MMOL/L (ref 7–16)
APPEARANCE UR: ABNORMAL
AST SERPL-CCNC: 64 U/L (ref 12–45)
BACTERIA #/AREA URNS HPF: NEGATIVE /HPF
BARBITURATES UR QL SCN: NEGATIVE
BASOPHILS # BLD AUTO: 0 % (ref 0–1.8)
BASOPHILS # BLD: 0 K/UL (ref 0–0.12)
BENZODIAZ UR QL SCN: NEGATIVE
BILIRUB SERPL-MCNC: 0.5 MG/DL (ref 0.1–1.5)
BILIRUB UR QL STRIP.AUTO: ABNORMAL
BUN SERPL-MCNC: 55 MG/DL (ref 8–22)
BUN SERPL-MCNC: 60 MG/DL (ref 8–22)
BURR CELLS BLD QL SMEAR: NORMAL
BZE UR QL SCN: NEGATIVE
CALCIUM SERPL-MCNC: 6.2 MG/DL (ref 8.5–10.5)
CALCIUM SERPL-MCNC: 7.1 MG/DL (ref 8.5–10.5)
CANNABINOIDS UR QL SCN: NEGATIVE
CAOX CRY #/AREA URNS HPF: ABNORMAL /HPF
CHLORIDE SERPL-SCNC: 89 MMOL/L (ref 96–112)
CHLORIDE SERPL-SCNC: 99 MMOL/L (ref 96–112)
CO2 SERPL-SCNC: 15 MMOL/L (ref 20–33)
CO2 SERPL-SCNC: 16 MMOL/L (ref 20–33)
COLOR UR: ABNORMAL
CORTIS SERPL-MCNC: 11 UG/DL (ref 0–23)
CREAT SERPL-MCNC: 4.23 MG/DL (ref 0.5–1.4)
CREAT SERPL-MCNC: 5.35 MG/DL (ref 0.5–1.4)
CREAT UR-MCNC: 200.23 MG/DL
D DIMER PPP IA.FEU-MCNC: <0.27 UG/ML (FEU) (ref 0–0.5)
EKG IMPRESSION: NORMAL
EOSINOPHIL # BLD AUTO: 0 K/UL (ref 0–0.51)
EOSINOPHIL NFR BLD: 0 % (ref 0–6.9)
EPI CELLS #/AREA URNS HPF: ABNORMAL /HPF
ERYTHROCYTE [DISTWIDTH] IN BLOOD BY AUTOMATED COUNT: 48.1 FL (ref 35.9–50)
GFR SERPLBLD CREATININE-BSD FMLA CKD-EPI: 11 ML/MIN/1.73 M 2
GFR SERPLBLD CREATININE-BSD FMLA CKD-EPI: 8 ML/MIN/1.73 M 2
GLOBULIN SER CALC-MCNC: 3.5 G/DL (ref 1.9–3.5)
GLUCOSE BLD STRIP.AUTO-MCNC: 119 MG/DL (ref 65–99)
GLUCOSE BLD STRIP.AUTO-MCNC: 91 MG/DL (ref 65–99)
GLUCOSE SERPL-MCNC: 105 MG/DL (ref 65–99)
GLUCOSE SERPL-MCNC: 106 MG/DL (ref 65–99)
GLUCOSE UR STRIP.AUTO-MCNC: NEGATIVE MG/DL
HCT VFR BLD AUTO: 33.1 % (ref 37–47)
HGB BLD-MCNC: 10.7 G/DL (ref 12–16)
HYALINE CASTS #/AREA URNS LPF: ABNORMAL /LPF
KETONES UR STRIP.AUTO-MCNC: NEGATIVE MG/DL
LACTATE SERPL-SCNC: 1.9 MMOL/L (ref 0.5–2)
LACTATE SERPL-SCNC: 2.6 MMOL/L (ref 0.5–2)
LACTATE SERPL-SCNC: 2.7 MMOL/L (ref 0.5–2)
LEUKOCYTE ESTERASE UR QL STRIP.AUTO: ABNORMAL
LYMPHOCYTES # BLD AUTO: 0.35 K/UL (ref 1–4.8)
LYMPHOCYTES NFR BLD: 2.6 % (ref 22–41)
MANUAL DIFF BLD: NORMAL
MCH RBC QN AUTO: 27.9 PG (ref 27–33)
MCHC RBC AUTO-ENTMCNC: 32.3 G/DL (ref 33.6–35)
MCV RBC AUTO: 86.2 FL (ref 81.4–97.8)
METAMYELOCYTES NFR BLD MANUAL: 0.9 %
METHADONE UR QL SCN: NEGATIVE
MICRO URNS: ABNORMAL
MONOCYTES # BLD AUTO: 0.58 K/UL (ref 0–0.85)
MONOCYTES NFR BLD AUTO: 4.3 % (ref 0–13.4)
MORPHOLOGY BLD-IMP: NORMAL
MYELOCYTES NFR BLD MANUAL: 1.7 %
NEUTROPHILS # BLD AUTO: 12.22 K/UL (ref 2–7.15)
NEUTROPHILS NFR BLD: 87 % (ref 44–72)
NEUTS BAND NFR BLD MANUAL: 3.5 % (ref 0–10)
NITRITE UR QL STRIP.AUTO: NEGATIVE
NRBC # BLD AUTO: 0.02 K/UL
NRBC BLD-RTO: 0.1 /100 WBC
OPIATES UR QL SCN: POSITIVE
OVALOCYTES BLD QL SMEAR: NORMAL
OXYCODONE UR QL SCN: POSITIVE
PCP UR QL SCN: NEGATIVE
PH UR STRIP.AUTO: 5 [PH] (ref 5–8)
PLATELET # BLD AUTO: 130 K/UL (ref 164–446)
PLATELET BLD QL SMEAR: NORMAL
PMV BLD AUTO: 9.9 FL (ref 9–12.9)
POIKILOCYTOSIS BLD QL SMEAR: NORMAL
POTASSIUM SERPL-SCNC: 4.7 MMOL/L (ref 3.6–5.5)
POTASSIUM SERPL-SCNC: 5.2 MMOL/L (ref 3.6–5.5)
PROCALCITONIN SERPL-MCNC: 7.05 NG/ML
PROPOXYPH UR QL SCN: NEGATIVE
PROT SERPL-MCNC: 6.1 G/DL (ref 6–8.2)
PROT UR QL STRIP: 100 MG/DL
RBC # BLD AUTO: 3.84 M/UL (ref 4.2–5.4)
RBC # URNS HPF: ABNORMAL /HPF
RBC BLD AUTO: PRESENT
RBC UR QL AUTO: ABNORMAL
SODIUM SERPL-SCNC: 121 MMOL/L (ref 135–145)
SODIUM SERPL-SCNC: 126 MMOL/L (ref 135–145)
SODIUM UR-SCNC: 22 MMOL/L
SP GR UR STRIP.AUTO: 1.02
UROBILINOGEN UR STRIP.AUTO-MCNC: 1 MG/DL
WBC # BLD AUTO: 13.5 K/UL (ref 4.8–10.8)
WBC #/AREA URNS HPF: ABNORMAL /HPF

## 2022-07-10 PROCEDURE — 87040 BLOOD CULTURE FOR BACTERIA: CPT

## 2022-07-10 PROCEDURE — 700111 HCHG RX REV CODE 636 W/ 250 OVERRIDE (IP): Performed by: EMERGENCY MEDICINE

## 2022-07-10 PROCEDURE — 770000 HCHG ROOM/CARE - INTERMEDIATE ICU *

## 2022-07-10 PROCEDURE — 700105 HCHG RX REV CODE 258: Performed by: EMERGENCY MEDICINE

## 2022-07-10 PROCEDURE — 82570 ASSAY OF URINE CREATININE: CPT

## 2022-07-10 PROCEDURE — 93005 ELECTROCARDIOGRAM TRACING: CPT | Performed by: EMERGENCY MEDICINE

## 2022-07-10 PROCEDURE — 87077 CULTURE AEROBIC IDENTIFY: CPT

## 2022-07-10 PROCEDURE — 700102 HCHG RX REV CODE 250 W/ 637 OVERRIDE(OP): Performed by: STUDENT IN AN ORGANIZED HEALTH CARE EDUCATION/TRAINING PROGRAM

## 2022-07-10 PROCEDURE — 99291 CRITICAL CARE FIRST HOUR: CPT | Mod: 25 | Performed by: STUDENT IN AN ORGANIZED HEALTH CARE EDUCATION/TRAINING PROGRAM

## 2022-07-10 PROCEDURE — 700111 HCHG RX REV CODE 636 W/ 250 OVERRIDE (IP): Performed by: STUDENT IN AN ORGANIZED HEALTH CARE EDUCATION/TRAINING PROGRAM

## 2022-07-10 PROCEDURE — 85025 COMPLETE CBC W/AUTO DIFF WBC: CPT

## 2022-07-10 PROCEDURE — 81001 URINALYSIS AUTO W/SCOPE: CPT

## 2022-07-10 PROCEDURE — 80048 BASIC METABOLIC PNL TOTAL CA: CPT

## 2022-07-10 PROCEDURE — 87086 URINE CULTURE/COLONY COUNT: CPT

## 2022-07-10 PROCEDURE — 80307 DRUG TEST PRSMV CHEM ANLYZR: CPT

## 2022-07-10 PROCEDURE — 82962 GLUCOSE BLOOD TEST: CPT

## 2022-07-10 PROCEDURE — 83605 ASSAY OF LACTIC ACID: CPT | Mod: 91

## 2022-07-10 PROCEDURE — 85379 FIBRIN DEGRADATION QUANT: CPT

## 2022-07-10 PROCEDURE — 84300 ASSAY OF URINE SODIUM: CPT

## 2022-07-10 PROCEDURE — 76775 US EXAM ABDO BACK WALL LIM: CPT

## 2022-07-10 PROCEDURE — 85007 BL SMEAR W/DIFF WBC COUNT: CPT

## 2022-07-10 PROCEDURE — 700105 HCHG RX REV CODE 258: Performed by: STUDENT IN AN ORGANIZED HEALTH CARE EDUCATION/TRAINING PROGRAM

## 2022-07-10 PROCEDURE — 99223 1ST HOSP IP/OBS HIGH 75: CPT | Mod: 25 | Performed by: STUDENT IN AN ORGANIZED HEALTH CARE EDUCATION/TRAINING PROGRAM

## 2022-07-10 PROCEDURE — 82533 TOTAL CORTISOL: CPT

## 2022-07-10 PROCEDURE — 87186 SC STD MICRODIL/AGAR DIL: CPT

## 2022-07-10 PROCEDURE — 71045 X-RAY EXAM CHEST 1 VIEW: CPT

## 2022-07-10 PROCEDURE — 84145 PROCALCITONIN (PCT): CPT

## 2022-07-10 PROCEDURE — A9270 NON-COVERED ITEM OR SERVICE: HCPCS | Performed by: STUDENT IN AN ORGANIZED HEALTH CARE EDUCATION/TRAINING PROGRAM

## 2022-07-10 PROCEDURE — 80053 COMPREHEN METABOLIC PANEL: CPT

## 2022-07-10 PROCEDURE — 73030 X-RAY EXAM OF SHOULDER: CPT | Mod: LT

## 2022-07-10 PROCEDURE — 93005 ELECTROCARDIOGRAM TRACING: CPT

## 2022-07-10 PROCEDURE — 74176 CT ABD & PELVIS W/O CONTRAST: CPT

## 2022-07-10 RX ORDER — ACETAMINOPHEN 325 MG/1
650 TABLET ORAL EVERY 6 HOURS PRN
Status: DISCONTINUED | OUTPATIENT
Start: 2022-07-10 | End: 2022-08-04 | Stop reason: HOSPADM

## 2022-07-10 RX ORDER — NALOXONE HYDROCHLORIDE 0.4 MG/ML
0.4 INJECTION, SOLUTION INTRAMUSCULAR; INTRAVENOUS; SUBCUTANEOUS PRN
Status: DISCONTINUED | OUTPATIENT
Start: 2022-07-10 | End: 2022-07-10

## 2022-07-10 RX ORDER — SODIUM CHLORIDE, SODIUM LACTATE, POTASSIUM CHLORIDE, AND CALCIUM CHLORIDE .6; .31; .03; .02 G/100ML; G/100ML; G/100ML; G/100ML
30 INJECTION, SOLUTION INTRAVENOUS ONCE
Status: COMPLETED | OUTPATIENT
Start: 2022-07-10 | End: 2022-07-10

## 2022-07-10 RX ORDER — GABAPENTIN 300 MG/1
300 CAPSULE ORAL DAILY
Status: DISCONTINUED | OUTPATIENT
Start: 2022-07-11 | End: 2022-07-11

## 2022-07-10 RX ORDER — NALOXONE HYDROCHLORIDE 0.4 MG/ML
0.2 INJECTION, SOLUTION INTRAMUSCULAR; INTRAVENOUS; SUBCUTANEOUS ONCE
Status: COMPLETED | OUTPATIENT
Start: 2022-07-10 | End: 2022-07-10

## 2022-07-10 RX ORDER — SODIUM CHLORIDE 9 MG/ML
INJECTION, SOLUTION INTRAVENOUS CONTINUOUS
Status: DISCONTINUED | OUTPATIENT
Start: 2022-07-10 | End: 2022-07-11

## 2022-07-10 RX ORDER — ATORVASTATIN CALCIUM 20 MG/1
20 TABLET, FILM COATED ORAL NIGHTLY
Status: DISCONTINUED | OUTPATIENT
Start: 2022-07-10 | End: 2022-08-04 | Stop reason: HOSPADM

## 2022-07-10 RX ORDER — PSEUDOEPHEDRINE HCL 30 MG
100 TABLET ORAL 2 TIMES DAILY
Status: ON HOLD | COMMUNITY
Start: 2022-04-25 | End: 2022-12-27

## 2022-07-10 RX ORDER — ALBUTEROL SULFATE 90 UG/1
2 AEROSOL, METERED RESPIRATORY (INHALATION) EVERY 4 HOURS PRN
Status: DISCONTINUED | OUTPATIENT
Start: 2022-07-10 | End: 2022-08-04 | Stop reason: HOSPADM

## 2022-07-10 RX ADMIN — ACETAMINOPHEN 650 MG: 325 TABLET, FILM COATED ORAL at 20:28

## 2022-07-10 RX ADMIN — SODIUM CHLORIDE: 9 INJECTION, SOLUTION INTRAVENOUS at 20:27

## 2022-07-10 RX ADMIN — PIPERACILLIN AND TAZOBACTAM 3.38 G: 3; .375 INJECTION, POWDER, LYOPHILIZED, FOR SOLUTION INTRAVENOUS; PARENTERAL at 23:32

## 2022-07-10 RX ADMIN — APIXABAN 5 MG: 5 TABLET, FILM COATED ORAL at 20:28

## 2022-07-10 RX ADMIN — SODIUM CHLORIDE, POTASSIUM CHLORIDE, SODIUM LACTATE AND CALCIUM CHLORIDE 2382 ML: 600; 310; 30; 20 INJECTION, SOLUTION INTRAVENOUS at 16:07

## 2022-07-10 RX ADMIN — NALOXONE HYDROCHLORIDE 0.4 MG: 0.4 INJECTION, SOLUTION INTRAMUSCULAR; INTRAVENOUS; SUBCUTANEOUS at 22:40

## 2022-07-10 RX ADMIN — NALOXONE HYDROCHLORIDE 0.1 MG/HR: 1 INJECTION PARENTERAL at 23:56

## 2022-07-10 RX ADMIN — ATORVASTATIN CALCIUM 20 MG: 20 TABLET, FILM COATED ORAL at 20:27

## 2022-07-10 RX ADMIN — NALOXONE HYDROCHLORIDE 0.2 MG: 0.4 INJECTION, SOLUTION INTRAMUSCULAR; INTRAVENOUS; SUBCUTANEOUS at 16:26

## 2022-07-10 ASSESSMENT — PAIN DESCRIPTION - PAIN TYPE
TYPE: ACUTE PAIN;CHRONIC PAIN
TYPE: ACUTE PAIN;CHRONIC PAIN

## 2022-07-10 ASSESSMENT — CHA2DS2 SCORE
PRIOR STROKE OR TIA OR THROMBOEMBOLISM: NO
VASCULAR DISEASE: NO
AGE 65 TO 74: NO
DIABETES: YES
SEX: FEMALE
CHF OR LEFT VENTRICULAR DYSFUNCTION: NO
HYPERTENSION: YES
AGE 75 OR GREATER: NO
CHA2DS2 VASC SCORE: 3

## 2022-07-10 ASSESSMENT — LIFESTYLE VARIABLES
DOES PATIENT WANT TO STOP DRINKING: NO
EVER FELT BAD OR GUILTY ABOUT YOUR DRINKING: NO
EVER HAD A DRINK FIRST THING IN THE MORNING TO STEADY YOUR NERVES TO GET RID OF A HANGOVER: NO
ALCOHOL_USE: NO
ON A TYPICAL DAY WHEN YOU DRINK ALCOHOL HOW MANY DRINKS DO YOU HAVE: 0
HAVE YOU EVER FELT YOU SHOULD CUT DOWN ON YOUR DRINKING: NO
DOES PATIENT WANT TO STOP DRINKING: NO
TOTAL SCORE: 0
DO YOU DRINK ALCOHOL: NO
HAVE PEOPLE ANNOYED YOU BY CRITICIZING YOUR DRINKING: NO
TOTAL SCORE: 0
TOTAL SCORE: 0
CONSUMPTION TOTAL: NEGATIVE
AVERAGE NUMBER OF DAYS PER WEEK YOU HAVE A DRINK CONTAINING ALCOHOL: 0
HOW MANY TIMES IN THE PAST YEAR HAVE YOU HAD 5 OR MORE DRINKS IN A DAY: 0

## 2022-07-10 ASSESSMENT — ENCOUNTER SYMPTOMS
GASTROINTESTINAL NEGATIVE: 1
WEAKNESS: 1
MUSCULOSKELETAL NEGATIVE: 1
EYES NEGATIVE: 1
CARDIOVASCULAR NEGATIVE: 1
RESPIRATORY NEGATIVE: 1
PSYCHIATRIC NEGATIVE: 1

## 2022-07-10 ASSESSMENT — FIBROSIS 4 INDEX
FIB4 SCORE: 4.83
FIB4 SCORE: 2.62

## 2022-07-10 NOTE — ED PROVIDER NOTES
ED Provider Note    CHIEF COMPLAINT  Chief Complaint   Patient presents with   • Hypotension   • Fatigue   • Arm Pain       HPI  April Alicia is a 62 y.o. female who presents to the emergency department chief complaint of altered mental status and fatigue and arm pain.  The patient called EMS because she was complaining of arm pain.  They found her to be hypotensive and a little bit altered and confused.  Patient was recently seen at [place] for left shoulder subluxation.  She states that she is continued to have pain and she took the prescribed hydrocodone but on my examination she is a little repetitive she keeps falling asleep and she keeps asking her current questions and I keep having to wake her up.  She was found to be mildly hypoxic as well as hypotensive on arrival without any signs of fever.    REVIEW OF SYSTEMS  Positives as above. P Limited secondary to altered mental status nausea vomiting chest pain shortness of breath headache fevers chills  All other review of systems are negative    PAST MEDICAL HISTORY   has a past medical history of Arrhythmia, Arthritis, ASTHMA, Blood clotting disorder ([facility]) (2018), Dental disorder, Diabetes, Emphysema of lung ([facility]), Heart abnormality, Heart burn, Heart valve disease, Hypertension, Infection (2017), Infection (2018), Liver disease, Pneumonia (2020), and Seizure disorder ([facility]).    SOCIAL HISTORY  Social History     Tobacco Use   • Smoking status: Former Smoker     Types: Cigarettes     Quit date: 2016     Years since quittin.5   • Smokeless tobacco: Former User     Quit date: 2021   • Tobacco comment: a few a day when I can   Vaping Use   • Vaping Use: Never used   Substance and Sexual Activity   • Alcohol use: No   • Drug use: No   • Sexual activity: Not on file       SURGICAL HISTORY   has a past surgical history that includes gyn surgery (); gyn surgery (); colonoscopy with clipping (10/28/2015); colonoscopy with  "sclerotherapy (10/28/2015); colonoscopy with tattooing (10/28/2015); irrigation & debridement ortho (Right, 9/3/2017); knee arthroplasty total (Right, 2018); and total knee arthroplasty (Left, 8/24/2020).    CURRENT MEDICATIONS  Home Medications    **Home medications have not yet been reviewed for this encounter**         ALLERGIES  Allergies   Allergen Reactions   • Nkda [No Known Drug Allergy]        PHYSICAL EXAM  VITAL SIGNS: BP (!) 81/45   Pulse 87   Temp 37 °C (98.6 °F) (Temporal)   Resp (!) 28   Ht 1.702 m (5' 7\")   Wt 79.4 kg (175 lb)   LMP 06/02/2008   SpO2 90%   BMI 27.41 kg/m²    Pulse ox interpretation: I interpret this pulse ox as low  Constitutional: Alert in no apparent distress.  HENT: Normocephalic atraumatic, dry mucous membranes  Eyes: P pupils are pinpoint bilaterally, Conjunctiva normal, Non-icteric.   Neck: Normal range of motion, No tenderness, Supple, No stridor.   Cardiovascular: Regular rate and rhythm, no murmurs.   Thorax & Lungs: Normal breath sounds, No respiratory distress, No wheezing, No chest tenderness.   Abdomen: Bowel sounds normal, Soft, No tenderness, No pulsatile masses. No peritoneal signs.  Back: No bony tenderness, No CVA tenderness.   Extremities/MSK: Intact equal distal pulses, little bit of trace pitting edema bilateral ankles she has skin grafts on the anterior shins bilaterally, tenderness little bit of swelling in the left shoulder without redness or erythema no joint effusion no cyanosis, no major deformities noted  Neurologic: Alert and oriented to person and place but not year little repetitive cranial nerves II through XII intact after multiple attempts moving all extremities without difficulty      DIFFERENTIAL DIAGNOSIS AND WORK UP PLAN    This is a 62 y.o. female who presents with some altered mental status hypotension dry mucous membranes little bit hypoxia with pinpoint pupils and concern for opioid overdose, patient abdomen soft nontender she does " have some left shoulder swelling but no evidence of infection.  Sepsis protocol was ordered as well as IV fluid resuscitation chest x-ray I doubt pulmonary embolism doubt ACS  Any chest pain, most likely hypoxia and hypotension secondary to an acute opioid overdose plan for small amount of Narcan to be given    DIAGNOSTIC STUDIES / PROCEDURES    EKG  Results for orders placed or performed during the hospital encounter of 07/10/22   EKG   Result Value Ref Range    Report       [facility] Emergency Dept.    Test Date:  2022-07-10  Pt Name:    FRANCISCA TURNER          Department: ER  MRN:        [id number]                      Room:       RD 11  Gender:     Female                       Technician: [id number]  :        1960                   Requested By:ER TRIAGE PROTOCOL  Order #:    [id number]                    Reading MD:    Measurements  Intervals                                Axis  Rate:       86                           P:          21  NM:         164                          QRS:        -13  QRSD:       104                          T:          94  QT:         376  QTc:        450    Interpretive Statements  SINUS RHYTHM  ATRIAL PREMATURE COMPLEX  BORDERLINE T WAVE ABNORMALITIES  Compared to ECG 2022 14:53:13  Atrial premature complex(es) now present  T-wave abnormality now present  Sinus bradycardia no longer present  Intraventricular conduction delay no longer present  ST (T wave) deviation no longer present         LABS  Pertinent Lab Findings    Labs Reviewed   CBC WITH DIFFERENTIAL - Abnormal; Notable for the following components:       Result Value    WBC 13.5 (*)     RBC 3.84 (*)     Hemoglobin 10.7 (*)     Hematocrit 33.1 (*)     MCHC 32.3 (*)     Platelet Count 130 (*)     Neutrophils-Polys 87.00 (*)     Lymphocytes 2.60 (*)     Neutrophils (Absolute) 12.22 (*)     Lymphs (Absolute) 0.35 (*)     All other components within normal limits   COMP METABOLIC PANEL - Abnormal;  Notable for the following components:    Sodium 121 (*)     Chloride 89 (*)     Co2 16 (*)     Glucose 105 (*)     Bun 60 (*)     Creatinine 5.35 (*)     Calcium 7.1 (*)     AST(SGOT) 64 (*)     Albumin 2.6 (*)     All other components within normal limits   LACTIC ACID - Abnormal; Notable for the following components:    Lactic Acid 2.6 (*)     All other components within normal limits   LACTIC ACID - Abnormal; Notable for the following components:    Lactic Acid 2.7 (*)     All other components within normal limits   URINALYSIS - Abnormal; Notable for the following components:    Character Cloudy (*)     Protein 100 (*)     Bilirubin Small (*)     Leukocyte Esterase Trace (*)     Occult Blood Moderate (*)     All other components within normal limits    Narrative:     Indication for culture:->Evaluation for sepsis without a  clear source of infection   URINE DRUG SCREEN - Abnormal; Notable for the following components:    Opiates Positive (*)     Oxycodone Positive (*)     All other components within normal limits    Narrative:     Indication for culture:->Evaluation for sepsis without a  clear source of infection   ESTIMATED GFR - Abnormal; Notable for the following components:    GFR (CKD-EPI) 8 (*)     All other components within normal limits   URINE MICROSCOPIC (W/UA) - Abnormal; Notable for the following components:    WBC  (*)     RBC 2-5 (*)     Epithelial Cells Many (*)     All other components within normal limits    Narrative:     Indication for culture:->Evaluation for sepsis without a  clear source of infection   POCT GLUCOSE DEVICE RESULTS - Abnormal; Notable for the following components:    POC Glucose, Blood 119 (*)     All other components within normal limits   DIFFERENTIAL MANUAL   PERIPHERAL SMEAR REVIEW   PLATELET ESTIMATE   MORPHOLOGY   URINE CREATININE RANDOM    Narrative:     Indication for culture:->Evaluation for sepsis without a  clear source of infection   URINE SODIUM RANDOM     "Narrative:     Indication for culture:->Evaluation for sepsis without a  clear source of infection   LACTIC ACID   BLOOD CULTURE    Narrative:     1 of 2 for Blood Culture x 2 sites order. Per Hospital  Policy: Only change Specimen Src: to \"Line\" if specified by  physician order.   BLOOD CULTURE    Narrative:     2 of 2 blood culture x2  Sites order. Per Hospital Policy:  Only change Specimen Src: to \"Line\" if specified by physician  order.   URINE CULTURE(NEW)    Narrative:     Indication for culture:->Evaluation for sepsis without a  clear source of infection   URINE SODIUM RANDOM   URINE CREATININE RANDOM   LACTIC ACID   POCT GLUCOSE DEVICE RESULTS       RADIOLOGY  US-RENAL   Final Result      1.  Unremarkable kidneys.   2.  Limited evaluation of the bladder.      CT-ABDOMEN-PELVIS W/O   Final Result         Limited exam due to lack of IV contrast.         1. Mild diffuse wall thickening of the urinary bladder could relate to infection or inflammation. Correlate with UA. No hydronephrosis.   2. Nonspecific mildly prominent fluid-filled proximal small bowel in the left abdomen. This could be seen with enteritis, focal ileus or early obstruction.   3. Cirrhotic liver with portal hypertension and splenomegaly. Wall thickening of the ascending colon could relate to portal hypertension.   4. No ascites.      DX-SHOULDER 2+ LEFT   Final Result      1.  No evidence of acute fracture or dislocation.      2.  Old posttraumatic changes of the distal clavicle.      DX-CHEST-PORTABLE (1 VIEW)   Final Result      Cardiomegaly.        The radiologist's interpretation of all radiological studies have been reviewed by me.      COURSE & MEDICAL DECISION MAKING  Pertinent Labs & Imaging studies reviewed. (See chart for details)    4:57 PM  Unfortunately the patient has evidence of acute on chronic renal failure with electrolyte disturbances although potassium is normal at 5.2.  She did have a positive response to the Narcan is " "definitely more alert she states that she has had decreased urine output over the last 3 days and she was told by physician a few years ago that she had renal issues but she tried to follow-up with primary care and states \"no one is ever retested my kidney function\".  She denies any pain based on the shoulder pain currently no chest pain or abdominal pain but she has had decreased urine output and a little bit of leg swelling.    5:22 PM  Spoke with Dr. Gaston with the ICU as I believe this patient be a good candidate for the stepdown unit.  She agrees and I spoke with Dr. Patel with the hospitalist service who is excepted the patient to stepdown.  I suspect the patient likely had a prolonged effect of the opioid secondary to her acute renal failure    Patient is having a positive response to IV fluid although her blood pressure still is a little bit labile which is while she going to the stepdown unit.  She does not require emergent dialysis at this time is getting resuscitated and understands plan for hospitalization.    CRITICAL CARE  The very real possibilty of a deterioration of this patient's condition required the highest level of my preparedness for sudden, emergent intervention.  I provided critical care services, which included medication orders, frequent reevaluations of the patient's condition and response to treatment, ordering and reviewing test results, and discussing the case with various consultants.  The critical care time associated with the care of the patient was 40 minutes. Review chart for interventions. This time is exclusive of any other billable procedures.       I verified that the patient was wearing a mask and I was wearing appropriate PPE every time I entered the room. The patient's mask was on the patient at all times during my encounter except for a brief view of the oropharynx.          FINAL IMPRESSION  1.  Acute renal failure  2.  Unintentional opioid overdose  3.  Labile blood " pressure  4.  Hyponatremia    Critical care time 40 minutes    Electronically signed by: Yashira Smith M.D., 7/10/2022 3:58 PM    This dictation has been created using voice recognition software and/or scribes. The accuracy of the dictation is limited by the abilities of the software and the expertise of the scribes. I expect there may be some errors of grammar and possibly content. I made every attempt to manually correct the errors within my dictation. However, errors related to voice recognition software and/or scribes may still exist and should be interpreted within the appropriate context.

## 2022-07-10 NOTE — ED TRIAGE NOTES
Patient to ED with complaints of left arm pain and bilateral knee apin x3 days. EMS found her to by hypotensive with SBP in the 60. She is falling asleep. Say she has NOT take any of her home meds today No chest pain or pressure.

## 2022-07-10 NOTE — ED NOTES
Pt now able to stay awake to answer questions following narcan. Still complaints of double vision.   States she has only making small amounts of urine at home.

## 2022-07-10 NOTE — ED NOTES
Redness and swelling to left upper arm. States she was seen at [facility] orthopedic. She is not sure what her diagnosis was but had a sling in place on arrival.

## 2022-07-11 ENCOUNTER — APPOINTMENT (OUTPATIENT)
Dept: RADIOLOGY | Facility: MEDICAL CENTER | Age: 62
DRG: 466 | End: 2022-07-11
Attending: STUDENT IN AN ORGANIZED HEALTH CARE EDUCATION/TRAINING PROGRAM
Payer: MEDICAID

## 2022-07-11 PROBLEM — N30.00 ACUTE CYSTITIS WITHOUT HEMATURIA: Status: ACTIVE | Noted: 2022-07-11

## 2022-07-11 PROBLEM — E87.1 HYPONATREMIA: Status: ACTIVE | Noted: 2022-07-11

## 2022-07-11 PROBLEM — R78.81 BACTEREMIA: Status: ACTIVE | Noted: 2022-07-11

## 2022-07-11 LAB
ANION GAP SERPL CALC-SCNC: 11 MMOL/L (ref 7–16)
ANION GAP SERPL CALC-SCNC: 12 MMOL/L (ref 7–16)
ANION GAP SERPL CALC-SCNC: 13 MMOL/L (ref 7–16)
APTT PPP: 51.8 SEC (ref 24.7–36)
BASOPHILS # BLD AUTO: 0 % (ref 0–1.8)
BASOPHILS # BLD: 0 K/UL (ref 0–0.12)
BUN SERPL-MCNC: 55 MG/DL (ref 8–22)
BUN SERPL-MCNC: 58 MG/DL (ref 8–22)
BUN SERPL-MCNC: 58 MG/DL (ref 8–22)
BUN SERPL-MCNC: 59 MG/DL (ref 8–22)
BUN SERPL-MCNC: 61 MG/DL (ref 8–22)
BURR CELLS BLD QL SMEAR: NORMAL
CA-I SERPL-SCNC: 1 MMOL/L (ref 1.1–1.3)
CALCIUM SERPL-MCNC: 6.6 MG/DL (ref 8.5–10.5)
CALCIUM SERPL-MCNC: 7.1 MG/DL (ref 8.5–10.5)
CALCIUM SERPL-MCNC: 7.2 MG/DL (ref 8.5–10.5)
CHLORIDE SERPL-SCNC: 93 MMOL/L (ref 96–112)
CHLORIDE SERPL-SCNC: 94 MMOL/L (ref 96–112)
CHLORIDE SERPL-SCNC: 94 MMOL/L (ref 96–112)
CHLORIDE SERPL-SCNC: 95 MMOL/L (ref 96–112)
CHLORIDE SERPL-SCNC: 97 MMOL/L (ref 96–112)
CO2 SERPL-SCNC: 15 MMOL/L (ref 20–33)
CO2 SERPL-SCNC: 17 MMOL/L (ref 20–33)
CO2 SERPL-SCNC: 18 MMOL/L (ref 20–33)
CREAT SERPL-MCNC: 2.43 MG/DL (ref 0.5–1.4)
CREAT SERPL-MCNC: 2.93 MG/DL (ref 0.5–1.4)
CREAT SERPL-MCNC: 3.37 MG/DL (ref 0.5–1.4)
CREAT SERPL-MCNC: 4.3 MG/DL (ref 0.5–1.4)
CREAT SERPL-MCNC: 4.65 MG/DL (ref 0.5–1.4)
EOSINOPHIL # BLD AUTO: 0 K/UL (ref 0–0.51)
EOSINOPHIL NFR BLD: 0 % (ref 0–6.9)
ERYTHROCYTE [DISTWIDTH] IN BLOOD BY AUTOMATED COUNT: 47.8 FL (ref 35.9–50)
GFR SERPLBLD CREATININE-BSD FMLA CKD-EPI: 10 ML/MIN/1.73 M 2
GFR SERPLBLD CREATININE-BSD FMLA CKD-EPI: 11 ML/MIN/1.73 M 2
GFR SERPLBLD CREATININE-BSD FMLA CKD-EPI: 15 ML/MIN/1.73 M 2
GFR SERPLBLD CREATININE-BSD FMLA CKD-EPI: 18 ML/MIN/1.73 M 2
GFR SERPLBLD CREATININE-BSD FMLA CKD-EPI: 22 ML/MIN/1.73 M 2
GLUCOSE BLD STRIP.AUTO-MCNC: 104 MG/DL (ref 65–99)
GLUCOSE BLD STRIP.AUTO-MCNC: 130 MG/DL (ref 65–99)
GLUCOSE BLD STRIP.AUTO-MCNC: 181 MG/DL (ref 65–99)
GLUCOSE BLD STRIP.AUTO-MCNC: 236 MG/DL (ref 65–99)
GLUCOSE SERPL-MCNC: 114 MG/DL (ref 65–99)
GLUCOSE SERPL-MCNC: 127 MG/DL (ref 65–99)
GLUCOSE SERPL-MCNC: 152 MG/DL (ref 65–99)
GLUCOSE SERPL-MCNC: 185 MG/DL (ref 65–99)
GLUCOSE SERPL-MCNC: 229 MG/DL (ref 65–99)
HCT VFR BLD AUTO: 33.6 % (ref 37–47)
HGB BLD-MCNC: 11 G/DL (ref 12–16)
INR PPP: 1.8 (ref 0.87–1.13)
LYMPHOCYTES # BLD AUTO: 0.43 K/UL (ref 1–4.8)
LYMPHOCYTES NFR BLD: 2.6 % (ref 22–41)
MANUAL DIFF BLD: NORMAL
MCH RBC QN AUTO: 28.2 PG (ref 27–33)
MCHC RBC AUTO-ENTMCNC: 32.7 G/DL (ref 33.6–35)
MCV RBC AUTO: 86.2 FL (ref 81.4–97.8)
MONOCYTES # BLD AUTO: 0 K/UL (ref 0–0.85)
MONOCYTES NFR BLD AUTO: 0 % (ref 0–13.4)
MORPHOLOGY BLD-IMP: NORMAL
MYELOCYTES NFR BLD MANUAL: 1.7 %
NEUTROPHILS # BLD AUTO: 15.98 K/UL (ref 2–7.15)
NEUTROPHILS NFR BLD: 93.9 % (ref 44–72)
NEUTS BAND NFR BLD MANUAL: 1.8 % (ref 0–10)
NRBC # BLD AUTO: 0 K/UL
NRBC BLD-RTO: 0 /100 WBC
OVALOCYTES BLD QL SMEAR: NORMAL
PLATELET # BLD AUTO: 125 K/UL (ref 164–446)
PLATELET BLD QL SMEAR: NORMAL
PMV BLD AUTO: 9.6 FL (ref 9–12.9)
POIKILOCYTOSIS BLD QL SMEAR: NORMAL
POTASSIUM SERPL-SCNC: 4.7 MMOL/L (ref 3.6–5.5)
POTASSIUM SERPL-SCNC: 4.8 MMOL/L (ref 3.6–5.5)
POTASSIUM SERPL-SCNC: 5.3 MMOL/L (ref 3.6–5.5)
POTASSIUM SERPL-SCNC: 5.4 MMOL/L (ref 3.6–5.5)
POTASSIUM SERPL-SCNC: 5.4 MMOL/L (ref 3.6–5.5)
PROTHROMBIN TIME: 20.4 SEC (ref 12–14.6)
PTH-INTACT SERPL-MCNC: 123 PG/ML (ref 14–72)
RBC # BLD AUTO: 3.9 M/UL (ref 4.2–5.4)
RBC BLD AUTO: PRESENT
SCCMEC + MECA PNL NOSE NAA+PROBE: POSITIVE
SODIUM SERPL-SCNC: 122 MMOL/L (ref 135–145)
SODIUM SERPL-SCNC: 123 MMOL/L (ref 135–145)
SODIUM SERPL-SCNC: 124 MMOL/L (ref 135–145)
WBC # BLD AUTO: 16.7 K/UL (ref 4.8–10.8)

## 2022-07-11 PROCEDURE — 85007 BL SMEAR W/DIFF WBC COUNT: CPT

## 2022-07-11 PROCEDURE — A9270 NON-COVERED ITEM OR SERVICE: HCPCS | Performed by: STUDENT IN AN ORGANIZED HEALTH CARE EDUCATION/TRAINING PROGRAM

## 2022-07-11 PROCEDURE — 770000 HCHG ROOM/CARE - INTERMEDIATE ICU *

## 2022-07-11 PROCEDURE — 99285 EMERGENCY DEPT VISIT HI MDM: CPT

## 2022-07-11 PROCEDURE — 700111 HCHG RX REV CODE 636 W/ 250 OVERRIDE (IP): Performed by: STUDENT IN AN ORGANIZED HEALTH CARE EDUCATION/TRAINING PROGRAM

## 2022-07-11 PROCEDURE — 80048 BASIC METABOLIC PNL TOTAL CA: CPT | Mod: 91

## 2022-07-11 PROCEDURE — 87641 MR-STAPH DNA AMP PROBE: CPT

## 2022-07-11 PROCEDURE — 303105 HCHG CATHETER EXTRA

## 2022-07-11 PROCEDURE — 96374 THER/PROPH/DIAG INJ IV PUSH: CPT

## 2022-07-11 PROCEDURE — 85025 COMPLETE CBC W/AUTO DIFF WBC: CPT

## 2022-07-11 PROCEDURE — 700105 HCHG RX REV CODE 258: Performed by: STUDENT IN AN ORGANIZED HEALTH CARE EDUCATION/TRAINING PROGRAM

## 2022-07-11 PROCEDURE — 700102 HCHG RX REV CODE 250 W/ 637 OVERRIDE(OP): Performed by: STUDENT IN AN ORGANIZED HEALTH CARE EDUCATION/TRAINING PROGRAM

## 2022-07-11 PROCEDURE — 700101 HCHG RX REV CODE 250: Performed by: STUDENT IN AN ORGANIZED HEALTH CARE EDUCATION/TRAINING PROGRAM

## 2022-07-11 PROCEDURE — 36415 COLL VENOUS BLD VENIPUNCTURE: CPT

## 2022-07-11 PROCEDURE — 82962 GLUCOSE BLOOD TEST: CPT | Mod: 91

## 2022-07-11 PROCEDURE — 99291 CRITICAL CARE FIRST HOUR: CPT | Performed by: STUDENT IN AN ORGANIZED HEALTH CARE EDUCATION/TRAINING PROGRAM

## 2022-07-11 PROCEDURE — 82330 ASSAY OF CALCIUM: CPT

## 2022-07-11 PROCEDURE — 85610 PROTHROMBIN TIME: CPT

## 2022-07-11 PROCEDURE — A9270 NON-COVERED ITEM OR SERVICE: HCPCS | Performed by: HOSPITALIST

## 2022-07-11 PROCEDURE — 97163 PT EVAL HIGH COMPLEX 45 MIN: CPT

## 2022-07-11 PROCEDURE — 93971 EXTREMITY STUDY: CPT | Mod: LT

## 2022-07-11 PROCEDURE — 700102 HCHG RX REV CODE 250 W/ 637 OVERRIDE(OP): Performed by: HOSPITALIST

## 2022-07-11 PROCEDURE — 83970 ASSAY OF PARATHORMONE: CPT

## 2022-07-11 PROCEDURE — 51702 INSERT TEMP BLADDER CATH: CPT

## 2022-07-11 PROCEDURE — 85730 THROMBOPLASTIN TIME PARTIAL: CPT

## 2022-07-11 PROCEDURE — 700111 HCHG RX REV CODE 636 W/ 250 OVERRIDE (IP): Performed by: HOSPITALIST

## 2022-07-11 RX ORDER — NOREPINEPHRINE BITARTRATE 0.03 MG/ML
0-30 INJECTION, SOLUTION INTRAVENOUS CONTINUOUS
Status: DISCONTINUED | OUTPATIENT
Start: 2022-07-11 | End: 2022-07-11

## 2022-07-11 RX ORDER — LINEZOLID 600 MG/1
600 TABLET, FILM COATED ORAL EVERY 12 HOURS
Status: DISCONTINUED | OUTPATIENT
Start: 2022-07-11 | End: 2022-07-11

## 2022-07-11 RX ORDER — MIDODRINE HYDROCHLORIDE 5 MG/1
10 TABLET ORAL EVERY 8 HOURS
Status: DISCONTINUED | OUTPATIENT
Start: 2022-07-11 | End: 2022-07-11

## 2022-07-11 RX ORDER — CEFAZOLIN SODIUM 2 G/100ML
2 INJECTION, SOLUTION INTRAVENOUS EVERY 8 HOURS
Status: DISCONTINUED | OUTPATIENT
Start: 2022-07-11 | End: 2022-07-11

## 2022-07-11 RX ORDER — COSYNTROPIN 0.25 MG/ML
250 INJECTION, POWDER, FOR SOLUTION INTRAMUSCULAR; INTRAVENOUS ONCE
Status: DISCONTINUED | OUTPATIENT
Start: 2022-07-11 | End: 2022-07-11

## 2022-07-11 RX ORDER — MIDODRINE HYDROCHLORIDE 5 MG/1
5 TABLET ORAL EVERY 8 HOURS
Status: DISCONTINUED | OUTPATIENT
Start: 2022-07-11 | End: 2022-07-13

## 2022-07-11 RX ORDER — LIDOCAINE 50 MG/G
1 PATCH TOPICAL ONCE
Status: ACTIVE | OUTPATIENT
Start: 2022-07-11 | End: 2022-07-12

## 2022-07-11 RX ORDER — SODIUM CHLORIDE, SODIUM LACTATE, POTASSIUM CHLORIDE, CALCIUM CHLORIDE 600; 310; 30; 20 MG/100ML; MG/100ML; MG/100ML; MG/100ML
INJECTION, SOLUTION INTRAVENOUS CONTINUOUS
Status: DISCONTINUED | OUTPATIENT
Start: 2022-07-11 | End: 2022-07-12

## 2022-07-11 RX ORDER — CALCIUM GLUCONATE 20 MG/ML
1 INJECTION, SOLUTION INTRAVENOUS ONCE
Status: COMPLETED | OUTPATIENT
Start: 2022-07-11 | End: 2022-07-11

## 2022-07-11 RX ADMIN — LINEZOLID 600 MG: 600 TABLET, FILM COATED ORAL at 02:23

## 2022-07-11 RX ADMIN — ATORVASTATIN CALCIUM 20 MG: 20 TABLET, FILM COATED ORAL at 20:32

## 2022-07-11 RX ADMIN — DRONEDARONE 400 MG: 400 TABLET, FILM COATED ORAL at 12:30

## 2022-07-11 RX ADMIN — PIPERACILLIN AND TAZOBACTAM 3.38 G: 3; .375 INJECTION, POWDER, LYOPHILIZED, FOR SOLUTION INTRAVENOUS; PARENTERAL at 02:24

## 2022-07-11 RX ADMIN — INSULIN HUMAN 3 UNITS: 100 INJECTION, SOLUTION PARENTERAL at 18:04

## 2022-07-11 RX ADMIN — SODIUM CHLORIDE, POTASSIUM CHLORIDE, SODIUM LACTATE AND CALCIUM CHLORIDE: 600; 310; 30; 20 INJECTION, SOLUTION INTRAVENOUS at 09:18

## 2022-07-11 RX ADMIN — INSULIN HUMAN 6 UNITS: 100 INJECTION, SOLUTION PARENTERAL at 11:51

## 2022-07-11 RX ADMIN — DRONEDARONE 400 MG: 400 TABLET, FILM COATED ORAL at 17:48

## 2022-07-11 RX ADMIN — HYDROCORTISONE SODIUM SUCCINATE 100 MG: 100 INJECTION, POWDER, FOR SOLUTION INTRAMUSCULAR; INTRAVENOUS at 01:19

## 2022-07-11 RX ADMIN — CALCIUM GLUCONATE 1 G: 20 INJECTION, SOLUTION INTRAVENOUS at 01:31

## 2022-07-11 RX ADMIN — ACETAMINOPHEN 650 MG: 325 TABLET, FILM COATED ORAL at 06:37

## 2022-07-11 RX ADMIN — MIDODRINE HYDROCHLORIDE 10 MG: 5 TABLET ORAL at 01:19

## 2022-07-11 RX ADMIN — SODIUM BICARBONATE 50 MEQ: 84 INJECTION, SOLUTION INTRAVENOUS at 00:27

## 2022-07-11 RX ADMIN — PIPERACILLIN AND TAZOBACTAM 3.38 G: 3; .375 INJECTION, POWDER, LYOPHILIZED, FOR SOLUTION INTRAVENOUS; PARENTERAL at 17:48

## 2022-07-11 RX ADMIN — MIDODRINE HYDROCHLORIDE 5 MG: 5 TABLET ORAL at 13:52

## 2022-07-11 RX ADMIN — APIXABAN 5 MG: 5 TABLET, FILM COATED ORAL at 06:37

## 2022-07-11 RX ADMIN — HYDROCORTISONE SODIUM SUCCINATE 100 MG: 100 INJECTION, POWDER, FOR SOLUTION INTRAMUSCULAR; INTRAVENOUS at 06:37

## 2022-07-11 RX ADMIN — NOREPINEPHRINE BITARTRATE 5 MCG/MIN: 1 INJECTION, SOLUTION, CONCENTRATE INTRAVENOUS at 00:24

## 2022-07-11 RX ADMIN — APIXABAN 5 MG: 5 TABLET, FILM COATED ORAL at 17:48

## 2022-07-11 RX ADMIN — HYDROCORTISONE SODIUM SUCCINATE 50 MG: 100 INJECTION, POWDER, FOR SOLUTION INTRAMUSCULAR; INTRAVENOUS at 13:45

## 2022-07-11 RX ADMIN — MIDODRINE HYDROCHLORIDE 10 MG: 5 TABLET ORAL at 06:37

## 2022-07-11 RX ADMIN — MIDODRINE HYDROCHLORIDE 5 MG: 5 TABLET ORAL at 22:00

## 2022-07-11 RX ADMIN — HYDROCORTISONE SODIUM SUCCINATE 50 MG: 100 INJECTION, POWDER, FOR SOLUTION INTRAMUSCULAR; INTRAVENOUS at 21:11

## 2022-07-11 RX ADMIN — SODIUM CHLORIDE, POTASSIUM CHLORIDE, SODIUM LACTATE AND CALCIUM CHLORIDE: 600; 310; 30; 20 INJECTION, SOLUTION INTRAVENOUS at 03:11

## 2022-07-11 ASSESSMENT — PAIN DESCRIPTION - PAIN TYPE
TYPE: ACUTE PAIN
TYPE: ACUTE PAIN;CHRONIC PAIN

## 2022-07-11 ASSESSMENT — COGNITIVE AND FUNCTIONAL STATUS - GENERAL
CLIMB 3 TO 5 STEPS WITH RAILING: TOTAL
MOVING TO AND FROM BED TO CHAIR: UNABLE
MOVING FROM LYING ON BACK TO SITTING ON SIDE OF FLAT BED: UNABLE
TURNING FROM BACK TO SIDE WHILE IN FLAT BAD: UNABLE
WALKING IN HOSPITAL ROOM: A LOT
STANDING UP FROM CHAIR USING ARMS: A LOT
MOBILITY SCORE: 8
SUGGESTED CMS G CODE MODIFIER MOBILITY: CM

## 2022-07-11 ASSESSMENT — ENCOUNTER SYMPTOMS
GASTROINTESTINAL NEGATIVE: 1
HEADACHES: 0
WEAKNESS: 0
NERVOUS/ANXIOUS: 1
CARDIOVASCULAR NEGATIVE: 1
VOMITING: 0
CHILLS: 0
DEPRESSION: 0
MYALGIAS: 1
EYES NEGATIVE: 1
HEARTBURN: 0
NECK PAIN: 0
BACK PAIN: 1
NEUROLOGICAL NEGATIVE: 1
COUGH: 0
BRUISES/BLEEDS EASILY: 0
RESPIRATORY NEGATIVE: 1
POLYDIPSIA: 0
DIZZINESS: 0
FOCAL WEAKNESS: 0
NAUSEA: 0
PALPITATIONS: 0
FEVER: 0

## 2022-07-11 ASSESSMENT — FIBROSIS 4 INDEX: FIB4 SCORE: 5.02

## 2022-07-11 NOTE — PROGRESS NOTES
Hospital Medicine Daily Progress Note    Date of Service  7/11/2022    Chief Complaint  April Alicia is a 62 y.o. female admitted 7/10/2022 with altered mental status from home    Hospital Course  This is a 62-year-old female with a history of remote cocaine abuse, alcohol abuse, presents with reports of feeling fatigued, tired for few days and having generalized pain all over.  The patient apparently was taking some narcotic substance to control her pain, subsequently she felt more drowsy and lethargic, brought to the emergency room by family.  In the emergency room the patient was found hypotensive, hyponatremia, acute renal failure, chest x-ray with cardiomegaly, CT with urinary abnormality and UTI per labs, cirrhotic liver with portal hypertension and splenomegaly, admitted to IMCU after the patient had difficulty with ongoing hypotension and decreased respiratory drive, the patient placed on a Narcan drip as well as pressors were attempted to be initiated.  The patient exhibits significant leukocytosis, she does have some mild thrombocytopenia, severe ELIZABETH, slowly improving with IV fluids, sodium level correcting slowly, metabolic acidosis improving, significant coagulopathy appropriate likely secondary to liver disease, blood cultures positive for strep x2, DVT study negative for acute DVT, renal ultrasound unremarkable, shoulder x-ray without dislocation or fracture, chest x-ray with cardiomegaly    Interval Problem Update  Patient seen and examined today.  Data, Medication data reviewed.  Case discussed with nursing as available.  Plan of Care reviewed with patient and notified of changes.  7/11 the patient is improved, she still complains of generalized pain, no nausea vomiting, no chest pain, blood pressure has stabilized, the patient is easily arousable, alert and oriented x4, nonfocal, currently afebrile, heart rate in the 50s, the patient is on 1 L nasal cannula oxygen, respiration unlabored,  blood pressure 107/58.  The patient remains on stress dose steroids as well as IV fluids, midodrine and LR    I have discussed this patient's plan of care and discharge plan at IDT rounds today with Case Management, Nursing, Nursing leadership, and other members of the IDT team.    Consultants/Specialty  critical care    Code Status  Full Code    Disposition  Patient is not medically cleared for discharge.   Anticipate discharge to to home with close outpatient follow-up.  I have placed the appropriate orders for post-discharge needs.    Review of Systems  Review of Systems   Constitutional: Positive for malaise/fatigue. Negative for chills and fever.   HENT: Negative.    Eyes: Negative.    Respiratory: Negative.  Negative for cough.    Cardiovascular: Negative.  Negative for chest pain and palpitations.   Gastrointestinal: Negative.  Negative for heartburn, nausea and vomiting.   Genitourinary: Positive for dysuria. Negative for frequency.   Musculoskeletal: Positive for back pain, joint pain and myalgias. Negative for neck pain.   Skin: Negative.  Negative for itching and rash.   Neurological: Negative.  Negative for dizziness, focal weakness, weakness and headaches.   Endo/Heme/Allergies: Negative.  Negative for polydipsia. Does not bruise/bleed easily.   Psychiatric/Behavioral: Negative for depression. The patient is nervous/anxious.         Physical Exam  Temp:  [36.2 °C (97.2 °F)-37.1 °C (98.8 °F)] 36.2 °C (97.2 °F)  Pulse:  [65-94] 65  Resp:  [8-65] 11  BP: ()/(28-63) 98/57  SpO2:  [84 %-100 %] 96 %    Physical Exam  Vitals and nursing note reviewed.   Constitutional:       Appearance: She is well-developed and overweight. She is ill-appearing. She is not toxic-appearing or diaphoretic.      Interventions: Nasal cannula in place.   HENT:      Head: Normocephalic and atraumatic.      Nose: Nose normal.      Mouth/Throat:      Comments: Poor dentition  Eyes:      Conjunctiva/sclera: Conjunctivae normal.       Pupils: Pupils are equal, round, and reactive to light.   Neck:      Thyroid: No thyromegaly.      Vascular: No JVD.   Cardiovascular:      Rate and Rhythm: Normal rate and regular rhythm.      Heart sounds: Normal heart sounds.     No friction rub. No gallop.   Pulmonary:      Effort: Pulmonary effort is normal.      Breath sounds: Normal breath sounds. No wheezing or rales.   Abdominal:      General: Bowel sounds are normal. There is no distension.      Palpations: Abdomen is soft. There is no mass.      Tenderness: There is no abdominal tenderness. There is no guarding or rebound.      Comments: Protuberant   Musculoskeletal:         General: Tenderness present. Normal range of motion.      Cervical back: Normal range of motion and neck supple.   Lymphadenopathy:      Cervical: No cervical adenopathy.   Skin:     General: Skin is warm and dry.   Neurological:      Mental Status: She is oriented to person, place, and time. She is lethargic.      Cranial Nerves: No cranial nerve deficit.   Psychiatric:         Behavior: Behavior normal.         Fluids    Intake/Output Summary (Last 24 hours) at 7/11/2022 0737  Last data filed at 7/11/2022 0600  Gross per 24 hour   Intake 4505.43 ml   Output 500 ml   Net 4005.43 ml       Laboratory  Recent Labs     07/10/22  1535 07/11/22  0433   WBC 13.5* 16.7*   RBC 3.84* 3.90*   HEMOGLOBIN 10.7* 11.0*   HEMATOCRIT 33.1* 33.6*   MCV 86.2 86.2   MCH 27.9 28.2   MCHC 32.3* 32.7*   RDW 48.1 47.8   PLATELETCT 130* 125*   MPV 9.9 9.6     Recent Labs     07/10/22  2314 07/11/22  0030 07/11/22  0433   SODIUM 126* 123* 123*   POTASSIUM 4.7 5.3 5.4   CHLORIDE 99 95* 93*   CO2 15* 17* 17*   GLUCOSE 106* 114* 127*   BUN 55* 61* 59*   CREATININE 4.23* 4.65* 4.30*   CALCIUM 6.2* 6.6* 7.2*     Recent Labs     07/11/22  0433   APTT 51.8*   INR 1.80*               Imaging  US-EXTREMITY VENOUS UPPER UNILAT LEFT   Final Result      US-RENAL   Final Result      1.  Unremarkable kidneys.   2.   Limited evaluation of the bladder.      CT-ABDOMEN-PELVIS W/O   Final Result         Limited exam due to lack of IV contrast.         1. Mild diffuse wall thickening of the urinary bladder could relate to infection or inflammation. Correlate with UA. No hydronephrosis.   2. Nonspecific mildly prominent fluid-filled proximal small bowel in the left abdomen. This could be seen with enteritis, focal ileus or early obstruction.   3. Cirrhotic liver with portal hypertension and splenomegaly. Wall thickening of the ascending colon could relate to portal hypertension.   4. No ascites.      DX-SHOULDER 2+ LEFT   Final Result      1.  No evidence of acute fracture or dislocation.      2.  Old posttraumatic changes of the distal clavicle.      DX-CHEST-PORTABLE (1 VIEW)   Final Result      Cardiomegaly.      EC-ECHOCARDIOGRAM COMPLETE W/O CONT    (Results Pending)        Assessment/Plan  * ELIZABETH (acute kidney injury) (HCC)- (present on admission)  Assessment & Plan  Likely secondary dehydration  Fluids  Serial BMP    Hyponatremia  Assessment & Plan  Correct slowly with IV fluids, frequent monitoring    Bacteremia  Assessment & Plan  2 bottles with strep species, await sensitivity and further specification    Acute cystitis without hematuria  Assessment & Plan  Empiric treatment, await cultures    Metabolic encephalopathy  Assessment & Plan  Noted to be sedated and drowsy upon exam.    Likely multifactorial in conjunction with narcotic use  S/p Narcan treatment, improved  Oxygen as needed, keep O2 90%      Hyperlipidemia- (present on admission)  Assessment & Plan  Continue Lipitor    Atrial fibrillation (HCC)- (present on admission)  Assessment & Plan  Continue Eliquis, Multaq    Cirrhosis (HCC)- (present on admission)  Assessment & Plan  Monitor, avoid hepatotoxins, check ammonia level    Hypertension- (present on admission)  Assessment & Plan  Medication hold secondary to hypotension, shock    Controlled type 2 diabetes  mellitus with hyperglycemia, without long-term current use of insulin (HCC)- (present on admission)  Assessment & Plan  Sliding-scale insulin, diabetic diet,     Plan  Continue with empiric antibiotics,  Wean midodrine and hydrocortisone slowly  Continue IV fluid support  Correct sodium slowly  Ongoing anticoagulation, rate and rhythm control with Multaq  Await cultures  See orders  Medically complex high risk patient  Follow-up laboratory data including ammonia level    VTE prophylaxis: therapeutic anticoagulation with Apixaban    I have performed a physical exam and reviewed and updated ROS and Plan today (7/11/2022). In review of yesterday's note (7/10/2022), there are no changes except as documented above.      Please note that this dictation was created using voice recognition software. I have made every reasonable attempt to correct obvious errors, but I expect that there are errors of grammar and possibly context that I did not discover before finalizing the note.

## 2022-07-11 NOTE — PROGRESS NOTES
[facility] Acceptance Note    Called by Dr. Yashira Smith for admission to IMCU for ELIZABETH and hypotension responding to IVF.  Will admit to IMCU under the care of the hospitalist.      Reina Gaston MD  Pulmonary and Critical Care Medicine
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
## 2022-07-11 NOTE — ED NOTES
Med rec updated and complete. Allergies reviewed. Confirmed name and date of birth.  Pt denies antibiotic use in last 30 days.      Preferred home pharmacy [facility] [phone]   Pt also uses [facility] [phone]
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
## 2022-07-11 NOTE — ASSESSMENT & PLAN NOTE
On apixaban and dronedarone as outpt (apixaban held currently due to hbg 7)  7/28:  dc'd eliquis since INR 2.25 from cirrhosis liver and transfusion needed for Hg 6.8.  Rate controlled
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
## 2022-07-11 NOTE — ASSESSMENT & PLAN NOTE
>>ASSESSMENT AND PLAN FOR CIRRHOSIS (HCC) WRITTEN ON 8/2/2022  1:54 PM BY OLVIN FLAHERTY M.D.    Secondary to ETOH  Continue lactulose  following

## 2022-07-11 NOTE — PROGRESS NOTES
4 Eyes Skin Assessment Completed by DANYEL Phan and DANYEL Cordon.    Head WDL  Ears WDL  Nose WDL  Mouth WDL  Neck WDL  Breast/Chest WDL  Shoulder Blades WDL  Spine WDL  (R) Arm/Elbow/Hand WDL  (L) Arm/Elbow/Hand Redness, Swelling and Shiny  Abdomen WDL  Groin WDL  Scrotum/Coccyx/Buttocks WDL  (R) Leg Scar  (L) Leg Scar  (R) Heel/Foot/Toe WDL  (L) Heel/Foot/Toe WDL          Devices In Places ECG, Blood Pressure Cuff, Pulse Ox, Amaro and Nasal Cannula      Interventions In Place Pillows, Q2 Turns, Heels Loaded W/Pillows and Pressure Redistribution Mattress    Possible Skin Injury No    Pictures Uploaded Into Epic N/A  Wound Consult Placed N/A  RN Wound Prevention Protocol Ordered Yes

## 2022-07-11 NOTE — ASSESSMENT & PLAN NOTE
Likely multifactorial in conjunction with narcotic use  S/p Narcan treatment, improved  Start lacutlose  Cont to treat infection     resolved

## 2022-07-11 NOTE — ASSESSMENT & PLAN NOTE
123 on admission  High SG on UA: probable hypovolemic and alcohol use  Cont to monitor    Resolved
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
## 2022-07-11 NOTE — ASSESSMENT & PLAN NOTE
Likely secondary dehydration  4.7 on admission   Good UOP  DC IVFs  Cont to monitor  Renally dose medications as appropriate    Resolved  Following intermittently

## 2022-07-12 LAB
ALBUMIN SERPL BCP-MCNC: 2 G/DL (ref 3.2–4.9)
ALBUMIN/GLOB SERPL: 0.6 G/DL
ALP SERPL-CCNC: 127 U/L (ref 30–99)
ALT SERPL-CCNC: 37 U/L (ref 2–50)
AMMONIA PLAS-SCNC: 57 UMOL/L (ref 11–45)
ANION GAP SERPL CALC-SCNC: 12 MMOL/L (ref 7–16)
AST SERPL-CCNC: 25 U/L (ref 12–45)
BILIRUB SERPL-MCNC: 0.5 MG/DL (ref 0.1–1.5)
BUN SERPL-MCNC: 57 MG/DL (ref 8–22)
CALCIUM SERPL-MCNC: 7.5 MG/DL (ref 8.5–10.5)
CHLORIDE SERPL-SCNC: 98 MMOL/L (ref 96–112)
CO2 SERPL-SCNC: 17 MMOL/L (ref 20–33)
CREAT SERPL-MCNC: 1.93 MG/DL (ref 0.5–1.4)
ERYTHROCYTE [DISTWIDTH] IN BLOOD BY AUTOMATED COUNT: 44.7 FL (ref 35.9–50)
EST. AVERAGE GLUCOSE BLD GHB EST-MCNC: 117 MG/DL
GFR SERPLBLD CREATININE-BSD FMLA CKD-EPI: 29 ML/MIN/1.73 M 2
GLOBULIN SER CALC-MCNC: 3.5 G/DL (ref 1.9–3.5)
GLUCOSE BLD STRIP.AUTO-MCNC: 162 MG/DL (ref 65–99)
GLUCOSE BLD STRIP.AUTO-MCNC: 178 MG/DL (ref 65–99)
GLUCOSE BLD STRIP.AUTO-MCNC: 179 MG/DL (ref 65–99)
GLUCOSE BLD STRIP.AUTO-MCNC: 201 MG/DL (ref 65–99)
GLUCOSE SERPL-MCNC: 163 MG/DL (ref 65–99)
HBA1C MFR BLD: 5.7 % (ref 4–5.6)
HCT VFR BLD AUTO: 31.3 % (ref 37–47)
HGB BLD-MCNC: 10.8 G/DL (ref 12–16)
IRON SATN MFR SERPL: ABNORMAL % (ref 15–55)
IRON SERPL-MCNC: 15 UG/DL (ref 40–170)
MAGNESIUM SERPL-MCNC: 1.9 MG/DL (ref 1.5–2.5)
MCH RBC QN AUTO: 28.6 PG (ref 27–33)
MCHC RBC AUTO-ENTMCNC: 34.5 G/DL (ref 33.6–35)
MCV RBC AUTO: 82.8 FL (ref 81.4–97.8)
PHOSPHATE SERPL-MCNC: 2.7 MG/DL (ref 2.5–4.5)
PLATELET # BLD AUTO: 124 K/UL (ref 164–446)
PMV BLD AUTO: 9.4 FL (ref 9–12.9)
POTASSIUM SERPL-SCNC: 4.5 MMOL/L (ref 3.6–5.5)
PROT SERPL-MCNC: 5.5 G/DL (ref 6–8.2)
RBC # BLD AUTO: 3.78 M/UL (ref 4.2–5.4)
SODIUM SERPL-SCNC: 127 MMOL/L (ref 135–145)
TIBC SERPL-MCNC: 98 UG/DL (ref 250–450)
UIBC SERPL-MCNC: 83 UG/DL (ref 110–370)
WBC # BLD AUTO: 17.5 K/UL (ref 4.8–10.8)

## 2022-07-12 PROCEDURE — 700105 HCHG RX REV CODE 258: Performed by: HOSPITALIST

## 2022-07-12 PROCEDURE — 700111 HCHG RX REV CODE 636 W/ 250 OVERRIDE (IP): Performed by: HOSPITALIST

## 2022-07-12 PROCEDURE — 84100 ASSAY OF PHOSPHORUS: CPT

## 2022-07-12 PROCEDURE — 99233 SBSQ HOSP IP/OBS HIGH 50: CPT | Performed by: HOSPITALIST

## 2022-07-12 PROCEDURE — 85027 COMPLETE CBC AUTOMATED: CPT

## 2022-07-12 PROCEDURE — 700105 HCHG RX REV CODE 258: Performed by: STUDENT IN AN ORGANIZED HEALTH CARE EDUCATION/TRAINING PROGRAM

## 2022-07-12 PROCEDURE — 83036 HEMOGLOBIN GLYCOSYLATED A1C: CPT

## 2022-07-12 PROCEDURE — 700102 HCHG RX REV CODE 250 W/ 637 OVERRIDE(OP): Performed by: HOSPITALIST

## 2022-07-12 PROCEDURE — 770001 HCHG ROOM/CARE - MED/SURG/GYN PRIV*

## 2022-07-12 PROCEDURE — 82140 ASSAY OF AMMONIA: CPT

## 2022-07-12 PROCEDURE — 82962 GLUCOSE BLOOD TEST: CPT | Mod: 91

## 2022-07-12 PROCEDURE — 700111 HCHG RX REV CODE 636 W/ 250 OVERRIDE (IP): Performed by: STUDENT IN AN ORGANIZED HEALTH CARE EDUCATION/TRAINING PROGRAM

## 2022-07-12 PROCEDURE — 83540 ASSAY OF IRON: CPT

## 2022-07-12 PROCEDURE — 83550 IRON BINDING TEST: CPT

## 2022-07-12 PROCEDURE — A9270 NON-COVERED ITEM OR SERVICE: HCPCS | Performed by: HOSPITALIST

## 2022-07-12 PROCEDURE — 80053 COMPREHEN METABOLIC PANEL: CPT

## 2022-07-12 PROCEDURE — A9270 NON-COVERED ITEM OR SERVICE: HCPCS | Performed by: STUDENT IN AN ORGANIZED HEALTH CARE EDUCATION/TRAINING PROGRAM

## 2022-07-12 PROCEDURE — 700102 HCHG RX REV CODE 250 W/ 637 OVERRIDE(OP): Performed by: STUDENT IN AN ORGANIZED HEALTH CARE EDUCATION/TRAINING PROGRAM

## 2022-07-12 PROCEDURE — 83735 ASSAY OF MAGNESIUM: CPT

## 2022-07-12 RX ORDER — LACTULOSE 20 G/30ML
15 SOLUTION ORAL DAILY
Status: DISCONTINUED | OUTPATIENT
Start: 2022-07-12 | End: 2022-08-04 | Stop reason: HOSPADM

## 2022-07-12 RX ORDER — LACTULOSE 20 G/30ML
30 SOLUTION ORAL 3 TIMES DAILY
Status: DISCONTINUED | OUTPATIENT
Start: 2022-07-12 | End: 2022-07-12

## 2022-07-12 RX ADMIN — MIDODRINE HYDROCHLORIDE 5 MG: 5 TABLET ORAL at 20:45

## 2022-07-12 RX ADMIN — INSULIN HUMAN 3 UNITS: 100 INJECTION, SOLUTION PARENTERAL at 17:30

## 2022-07-12 RX ADMIN — SODIUM CHLORIDE, POTASSIUM CHLORIDE, SODIUM LACTATE AND CALCIUM CHLORIDE: 600; 310; 30; 20 INJECTION, SOLUTION INTRAVENOUS at 10:48

## 2022-07-12 RX ADMIN — SODIUM CHLORIDE 3 G: 900 INJECTION INTRAVENOUS at 17:24

## 2022-07-12 RX ADMIN — MIDODRINE HYDROCHLORIDE 5 MG: 5 TABLET ORAL at 13:24

## 2022-07-12 RX ADMIN — ATORVASTATIN CALCIUM 20 MG: 20 TABLET, FILM COATED ORAL at 20:45

## 2022-07-12 RX ADMIN — LACTULOSE 15 ML: 20 SOLUTION ORAL at 12:31

## 2022-07-12 RX ADMIN — SODIUM CHLORIDE 3 G: 900 INJECTION INTRAVENOUS at 12:32

## 2022-07-12 RX ADMIN — PIPERACILLIN AND TAZOBACTAM 3.38 G: 3; .375 INJECTION, POWDER, LYOPHILIZED, FOR SOLUTION INTRAVENOUS; PARENTERAL at 05:29

## 2022-07-12 RX ADMIN — HYDROCORTISONE SODIUM SUCCINATE 50 MG: 100 INJECTION, POWDER, FOR SOLUTION INTRAMUSCULAR; INTRAVENOUS at 05:29

## 2022-07-12 RX ADMIN — DRONEDARONE 400 MG: 400 TABLET, FILM COATED ORAL at 17:23

## 2022-07-12 RX ADMIN — ACETAMINOPHEN 650 MG: 325 TABLET, FILM COATED ORAL at 23:02

## 2022-07-12 RX ADMIN — INSULIN HUMAN 6 UNITS: 100 INJECTION, SOLUTION PARENTERAL at 12:35

## 2022-07-12 RX ADMIN — INSULIN HUMAN 3 UNITS: 100 INJECTION, SOLUTION PARENTERAL at 00:24

## 2022-07-12 RX ADMIN — APIXABAN 5 MG: 5 TABLET, FILM COATED ORAL at 05:29

## 2022-07-12 RX ADMIN — SODIUM CHLORIDE 3 G: 900 INJECTION INTRAVENOUS at 23:04

## 2022-07-12 RX ADMIN — MIDODRINE HYDROCHLORIDE 5 MG: 5 TABLET ORAL at 05:29

## 2022-07-12 RX ADMIN — APIXABAN 5 MG: 5 TABLET, FILM COATED ORAL at 17:23

## 2022-07-12 RX ADMIN — DRONEDARONE 400 MG: 400 TABLET, FILM COATED ORAL at 08:19

## 2022-07-12 RX ADMIN — INSULIN HUMAN 3 UNITS: 100 INJECTION, SOLUTION PARENTERAL at 23:52

## 2022-07-12 RX ADMIN — INSULIN HUMAN 3 UNITS: 100 INJECTION, SOLUTION PARENTERAL at 05:42

## 2022-07-12 ASSESSMENT — ENCOUNTER SYMPTOMS
BACK PAIN: 0
COUGH: 0
DOUBLE VISION: 0
BLURRED VISION: 0
LOSS OF CONSCIOUSNESS: 0
HEADACHES: 0
SHORTNESS OF BREATH: 0
ABDOMINAL PAIN: 0
NAUSEA: 0
CHILLS: 0
SORE THROAT: 0
DIARRHEA: 0
FEVER: 0
VOMITING: 0
PALPITATIONS: 0
DIZZINESS: 0

## 2022-07-12 NOTE — CARE PLAN
The patient is Watcher - Medium risk of patient condition declining or worsening    Shift Goals  Clinical Goals: Improved hemodynamics, and maintain mentaion  Patient Goals: Get better  Family Goals: Not present    Progress made toward(s) clinical / shift goals:  Progressing  Problem: Knowledge Deficit - Standard  Goal: Patient and family/care givers will demonstrate understanding of plan of care, disease process/condition, diagnostic tests and medications  Outcome: Progressing     Problem: Pain - Standard  Goal: Alleviation of pain or a reduction in pain to the patient’s comfort goal  Outcome: Progressing     Problem: Skin Integrity  Goal: Skin integrity is maintained or improved  Outcome: Progressing       Patient is not progressing towards the following goals:

## 2022-07-12 NOTE — CARE PLAN
The patient is Stable - Low risk of patient condition declining or worsening    Shift Goals  Clinical Goals: monitor hemodynamics, pain management, Monitor I/Os  Patient Goals: rest, comfort, pain control  Family Goals: EDMUND    Progress made toward(s) clinical / shift goals:     Education and updated patient and friend regarding future plan of nursing care, Pain was managed with Ice pack and active PRN medications, slept majority of night.

## 2022-07-12 NOTE — PROGRESS NOTES
"Hospital Medicine Daily Progress Note    Date of Service  7/12/2022    Chief Complaint  April Alicia is a 62 y.o. female admitted 7/10/2022 with hypotension, AMS    Hospital Course  61yo PMHx coccaine and ETOH abuse though sober x 8 years per her report, cirrhosis, asthma, AFib on apixaban, DM, HTN. Took an unknown narcotic.  In ED responded to narcan, labs showing Na 121, Cl 89, Creat 5.35, CXR showing cardiomegally.  Admitted to the floor with AMS, HypoNa, dehydration ?sepsis from urinary source.  Admitted to the floor.  Later on day of admission transferred to IMCU for hypotension.  Subsequent blood cultures + Strep Species    Interval Problem Update  ROS is limited by confusion.  Pt states she feels \"OK\".  Notes some pain in her L arm but otherwise has no complaints    ssinus vikki 40-50s  2 LNC  -130s  2 LNC  UOP 1.6 litres/24hrs  I have discussed this patient's plan of care and discharge plan at IDT rounds today with Case Management, Nursing, Nursing leadership, and other members of the IDT team.    Consultants/Specialty      Code Status  Full Code    Disposition  Patient is not medically cleared for discharge.   Anticipate discharge to to home with close outpatient follow-up.  I have placed the appropriate orders for post-discharge needs.    Review of Systems  Review of Systems   Unable to perform ROS: Mental status change   Constitutional: Negative for chills and fever.   HENT: Negative for nosebleeds and sore throat.    Eyes: Negative for blurred vision and double vision.   Respiratory: Negative for cough and shortness of breath.    Cardiovascular: Negative for chest pain, palpitations and leg swelling.   Gastrointestinal: Negative for abdominal pain, diarrhea, nausea and vomiting.   Genitourinary: Negative for dysuria and urgency.   Musculoskeletal: Negative for back pain.        L arm pain   Skin: Negative for rash.   Neurological: Negative for dizziness, loss of consciousness and " headaches.        Physical Exam  Pulse:  [47-70] 48  Resp:  [8-32] 10  BP: ()/(55-76) 125/58  SpO2:  [93 %-98 %] 96 %    Physical Exam  Vitals reviewed.   Constitutional:       General: She is not in acute distress.     Appearance: Normal appearance. She is well-developed. She is not diaphoretic.   HENT:      Head: Normocephalic and atraumatic.   Neck:      Vascular: No JVD.   Cardiovascular:      Rate and Rhythm: Normal rate and regular rhythm.      Heart sounds: Murmur heard.   Pulmonary:      Effort: Pulmonary effort is normal. No respiratory distress.      Breath sounds: No stridor. No wheezing or rales.   Abdominal:      Palpations: Abdomen is soft.      Tenderness: There is no abdominal tenderness. There is no guarding or rebound.   Musculoskeletal:         General: No tenderness.      Right lower leg: No edema.      Left lower leg: No edema.      Comments: Trace edema L arm  No TTP  Full ROM of shoulder, elbow and wrist   Skin:     General: Skin is warm and dry.      Findings: No rash.   Neurological:      Mental Status: She is alert.      Comments: O to person and place, does not know why she's here, will repeat questions every few minutes.    Non focal     Psychiatric:         Mood and Affect: Affect is flat.         Fluids    Intake/Output Summary (Last 24 hours) at 7/12/2022 0610  Last data filed at 7/12/2022 0400  Gross per 24 hour   Intake 1570 ml   Output 1683 ml   Net -113 ml       Laboratory  Recent Labs     07/10/22  1535 07/11/22  0433 07/12/22  0354   WBC 13.5* 16.7* 17.5*   RBC 3.84* 3.90* 3.78*   HEMOGLOBIN 10.7* 11.0* 10.8*   HEMATOCRIT 33.1* 33.6* 31.3*   MCV 86.2 86.2 82.8   MCH 27.9 28.2 28.6   MCHC 32.3* 32.7* 34.5   RDW 48.1 47.8 44.7   PLATELETCT 130* 125* 124*   MPV 9.9 9.6 9.4     Recent Labs     07/11/22  1351 07/11/22  1836 07/12/22  0354   SODIUM 122* 124* 127*   POTASSIUM 4.8 4.7 4.5   CHLORIDE 94* 97 98   CO2 17* 15* 17*   GLUCOSE 229* 185* 163*   BUN 58* 55* 57*   CREATININE  2.93* 2.43* 1.93*   CALCIUM 7.1* 7.2* 7.5*     Recent Labs     07/11/22  0433   APTT 51.8*   INR 1.80*               Imaging  US-EXTREMITY VENOUS UPPER UNILAT LEFT   Final Result      US-RENAL   Final Result      1.  Unremarkable kidneys.   2.  Limited evaluation of the bladder.      CT-ABDOMEN-PELVIS W/O   Final Result         Limited exam due to lack of IV contrast.         1. Mild diffuse wall thickening of the urinary bladder could relate to infection or inflammation. Correlate with UA. No hydronephrosis.   2. Nonspecific mildly prominent fluid-filled proximal small bowel in the left abdomen. This could be seen with enteritis, focal ileus or early obstruction.   3. Cirrhotic liver with portal hypertension and splenomegaly. Wall thickening of the ascending colon could relate to portal hypertension.   4. No ascites.      DX-SHOULDER 2+ LEFT   Final Result      1.  No evidence of acute fracture or dislocation.      2.  Old posttraumatic changes of the distal clavicle.      DX-CHEST-PORTABLE (1 VIEW)   Final Result      Cardiomegaly.      EC-ECHOCARDIOGRAM COMPLETE W/O CONT    (Results Pending)        Assessment/Plan  * ELIZABETH (acute kidney injury) (HCC)- (present on admission)  Assessment & Plan  Likely secondary dehydration  4.7 on admission now 1.9: unknown baseline  Good UOP  DC IVFs  Cont to monitor  Renally dose medications as appropriate    Hyponatremia  Assessment & Plan  123 on admission  High SG on UA: probable hypovolemic  Now up to 127 s/p IVFs  Cont to monitor    Bacteremia  Assessment & Plan  2 bottles with strep species, await sensitivity and further specification  Cont amp/sulbactam  Check TTE  Repeat blood cultures  Stop hydrocortisone, cont midodrine and taper off rapidly if pt is stable      Acute cystitis without hematuria  Assessment & Plan  Urine is equivocal  Follow culture    Metabolic encephalopathy  Assessment & Plan  Now has a normal level of alertness but poor short term recall  Likely  multifactorial in conjunction with narcotic use  S/p Narcan treatment, improved  Start lacutlose  Cont to treat infection      Hyperlipidemia- (present on admission)  Assessment & Plan  Continue Lipitor    Atrial fibrillation (HCC)- (present on admission)  Assessment & Plan  On apixaban and dronedarone as outpt    Cirrhosis (HCC)- (present on admission)  Assessment & Plan  Secondary to ETOH  Lactulose  monitor    Hypertension- (present on admission)  Assessment & Plan  Medication hold secondary to hypotension, shock    Controlled type 2 diabetes mellitus with hyperglycemia, without long-term current use of insulin (HCC)- (present on admission)  Assessment & Plan  Sliding-scale insulin, diabetic diet  Running mid to high 100s  Send A1c  On metformin  As outpt       VTE prophylaxis: enoxaparin ppx    I have performed a physical exam and reviewed and updated ROS and Plan today (7/12/2022). In review of yesterday's note (7/11/2022), there are no changes except as documented above.

## 2022-07-12 NOTE — CARE PLAN
The patient is Stable - Low risk of patient condition declining or worsening    Shift Goals  Clinical Goals: monitor hemodynamics, pain management, Monitor I/Os  Patient Goals: rest, comfort, pain control  Family Goals: EDMUND    Progress made toward(s) clinical / shift goals: BM 7/12    Patient is not progressing towards the following goals: ECHO pending     Problem: Knowledge Deficit - Standard  Goal: Patient and family/care givers will demonstrate understanding of plan of care, disease process/condition, diagnostic tests and medications  Outcome: Progressing  Note: ECHO pending, IV ABX, PT/OT, pt updated on POC, pt forgetful and needs frequent reminding of POC.      Problem: Skin Integrity  Goal: Skin integrity is maintained or improved  Outcome: Progressing  Note: Pt cleaned and changed PRN, martin in place, no signs of skin breakdown.

## 2022-07-13 ENCOUNTER — APPOINTMENT (OUTPATIENT)
Dept: RADIOLOGY | Facility: MEDICAL CENTER | Age: 62
DRG: 466 | End: 2022-07-13
Attending: STUDENT IN AN ORGANIZED HEALTH CARE EDUCATION/TRAINING PROGRAM
Payer: MEDICAID

## 2022-07-13 ENCOUNTER — APPOINTMENT (OUTPATIENT)
Dept: CARDIOLOGY | Facility: MEDICAL CENTER | Age: 62
DRG: 466 | End: 2022-07-13
Attending: STUDENT IN AN ORGANIZED HEALTH CARE EDUCATION/TRAINING PROGRAM
Payer: MEDICAID

## 2022-07-13 PROBLEM — D50.9 IRON DEFICIENCY ANEMIA: Status: ACTIVE | Noted: 2022-07-13

## 2022-07-13 LAB
ANION GAP SERPL CALC-SCNC: 9 MMOL/L (ref 7–16)
BACTERIA BLD CULT: ABNORMAL
BACTERIA UR CULT: NORMAL
BUN SERPL-MCNC: 46 MG/DL (ref 8–22)
CALCIUM SERPL-MCNC: 7.9 MG/DL (ref 8.5–10.5)
CHLORIDE SERPL-SCNC: 104 MMOL/L (ref 96–112)
CO2 SERPL-SCNC: 20 MMOL/L (ref 20–33)
CREAT SERPL-MCNC: 1.03 MG/DL (ref 0.5–1.4)
ERYTHROCYTE [DISTWIDTH] IN BLOOD BY AUTOMATED COUNT: 45.6 FL (ref 35.9–50)
GFR SERPLBLD CREATININE-BSD FMLA CKD-EPI: 61 ML/MIN/1.73 M 2
GLUCOSE BLD STRIP.AUTO-MCNC: 121 MG/DL (ref 65–99)
GLUCOSE BLD STRIP.AUTO-MCNC: 144 MG/DL (ref 65–99)
GLUCOSE BLD STRIP.AUTO-MCNC: 150 MG/DL (ref 65–99)
GLUCOSE BLD STRIP.AUTO-MCNC: 153 MG/DL (ref 65–99)
GLUCOSE BLD STRIP.AUTO-MCNC: 174 MG/DL (ref 65–99)
GLUCOSE SERPL-MCNC: 127 MG/DL (ref 65–99)
HCT VFR BLD AUTO: 35.8 % (ref 37–47)
HGB BLD-MCNC: 12.1 G/DL (ref 12–16)
MCH RBC QN AUTO: 28.1 PG (ref 27–33)
MCHC RBC AUTO-ENTMCNC: 33.8 G/DL (ref 33.6–35)
MCV RBC AUTO: 83.3 FL (ref 81.4–97.8)
PLATELET # BLD AUTO: 120 K/UL (ref 164–446)
PMV BLD AUTO: 8.8 FL (ref 9–12.9)
POTASSIUM SERPL-SCNC: 3.5 MMOL/L (ref 3.6–5.5)
RBC # BLD AUTO: 4.3 M/UL (ref 4.2–5.4)
SIGNIFICANT IND 70042: ABNORMAL
SIGNIFICANT IND 70042: ABNORMAL
SIGNIFICANT IND 70042: NORMAL
SITE SITE: ABNORMAL
SITE SITE: ABNORMAL
SITE SITE: NORMAL
SODIUM SERPL-SCNC: 133 MMOL/L (ref 135–145)
SOURCE SOURCE: ABNORMAL
SOURCE SOURCE: ABNORMAL
SOURCE SOURCE: NORMAL
WBC # BLD AUTO: 9.1 K/UL (ref 4.8–10.8)

## 2022-07-13 PROCEDURE — 36415 COLL VENOUS BLD VENIPUNCTURE: CPT

## 2022-07-13 PROCEDURE — 700105 HCHG RX REV CODE 258: Performed by: HOSPITALIST

## 2022-07-13 PROCEDURE — 97167 OT EVAL HIGH COMPLEX 60 MIN: CPT

## 2022-07-13 PROCEDURE — 700101 HCHG RX REV CODE 250

## 2022-07-13 PROCEDURE — 82962 GLUCOSE BLOOD TEST: CPT | Mod: 91

## 2022-07-13 PROCEDURE — A9270 NON-COVERED ITEM OR SERVICE: HCPCS | Performed by: HOSPITALIST

## 2022-07-13 PROCEDURE — 700102 HCHG RX REV CODE 250 W/ 637 OVERRIDE(OP): Performed by: STUDENT IN AN ORGANIZED HEALTH CARE EDUCATION/TRAINING PROGRAM

## 2022-07-13 PROCEDURE — 700102 HCHG RX REV CODE 250 W/ 637 OVERRIDE(OP): Performed by: HOSPITALIST

## 2022-07-13 PROCEDURE — 73560 X-RAY EXAM OF KNEE 1 OR 2: CPT | Mod: RT

## 2022-07-13 PROCEDURE — 87040 BLOOD CULTURE FOR BACTERIA: CPT

## 2022-07-13 PROCEDURE — 700111 HCHG RX REV CODE 636 W/ 250 OVERRIDE (IP): Performed by: HOSPITALIST

## 2022-07-13 PROCEDURE — 770001 HCHG ROOM/CARE - MED/SURG/GYN PRIV*

## 2022-07-13 PROCEDURE — A9270 NON-COVERED ITEM OR SERVICE: HCPCS | Performed by: STUDENT IN AN ORGANIZED HEALTH CARE EDUCATION/TRAINING PROGRAM

## 2022-07-13 PROCEDURE — 80048 BASIC METABOLIC PNL TOTAL CA: CPT

## 2022-07-13 PROCEDURE — 99233 SBSQ HOSP IP/OBS HIGH 50: CPT | Performed by: STUDENT IN AN ORGANIZED HEALTH CARE EDUCATION/TRAINING PROGRAM

## 2022-07-13 PROCEDURE — 85027 COMPLETE CBC AUTOMATED: CPT

## 2022-07-13 PROCEDURE — 700111 HCHG RX REV CODE 636 W/ 250 OVERRIDE (IP)

## 2022-07-13 PROCEDURE — 73560 X-RAY EXAM OF KNEE 1 OR 2: CPT | Mod: LT

## 2022-07-13 RX ORDER — DEXTROSE MONOHYDRATE 25 G/50ML
25 INJECTION, SOLUTION INTRAVENOUS
Status: DISCONTINUED | OUTPATIENT
Start: 2022-07-13 | End: 2022-08-03

## 2022-07-13 RX ORDER — POTASSIUM CHLORIDE 20 MEQ/1
40 TABLET, EXTENDED RELEASE ORAL ONCE
Status: COMPLETED | OUTPATIENT
Start: 2022-07-13 | End: 2022-07-13

## 2022-07-13 RX ORDER — LIDOCAINE 50 MG/G
1 PATCH TOPICAL EVERY 24 HOURS
Status: DISCONTINUED | OUTPATIENT
Start: 2022-07-13 | End: 2022-08-04 | Stop reason: HOSPADM

## 2022-07-13 RX ADMIN — FENTANYL CITRATE 25 MCG: 50 INJECTION, SOLUTION INTRAMUSCULAR; INTRAVENOUS at 03:23

## 2022-07-13 RX ADMIN — SODIUM CHLORIDE 3 G: 900 INJECTION INTRAVENOUS at 17:29

## 2022-07-13 RX ADMIN — MIDODRINE HYDROCHLORIDE 5 MG: 5 TABLET ORAL at 05:36

## 2022-07-13 RX ADMIN — ACETAMINOPHEN 650 MG: 325 TABLET, FILM COATED ORAL at 22:15

## 2022-07-13 RX ADMIN — SODIUM CHLORIDE 3 G: 900 INJECTION INTRAVENOUS at 05:40

## 2022-07-13 RX ADMIN — LIDOCAINE 1 PATCH: 700 PATCH TOPICAL at 03:24

## 2022-07-13 RX ADMIN — POTASSIUM CHLORIDE 40 MEQ: 1500 TABLET, EXTENDED RELEASE ORAL at 12:29

## 2022-07-13 RX ADMIN — APIXABAN 5 MG: 5 TABLET, FILM COATED ORAL at 05:36

## 2022-07-13 RX ADMIN — INSULIN HUMAN 3 UNITS: 100 INJECTION, SOLUTION PARENTERAL at 22:17

## 2022-07-13 RX ADMIN — ATORVASTATIN CALCIUM 20 MG: 20 TABLET, FILM COATED ORAL at 22:15

## 2022-07-13 RX ADMIN — DRONEDARONE 400 MG: 400 TABLET, FILM COATED ORAL at 17:29

## 2022-07-13 RX ADMIN — SODIUM CHLORIDE 3 G: 900 INJECTION INTRAVENOUS at 11:23

## 2022-07-13 RX ADMIN — LACTULOSE 15 ML: 20 SOLUTION ORAL at 05:37

## 2022-07-13 ASSESSMENT — COGNITIVE AND FUNCTIONAL STATUS - GENERAL
HELP NEEDED FOR BATHING: A LOT
MOVING FROM LYING ON BACK TO SITTING ON SIDE OF FLAT BED: UNABLE
MOVING TO AND FROM BED TO CHAIR: UNABLE
HELP NEEDED FOR BATHING: A LOT
DAILY ACTIVITIY SCORE: 14
DRESSING REGULAR LOWER BODY CLOTHING: A LOT
DRESSING REGULAR UPPER BODY CLOTHING: A LOT
SUGGESTED CMS G CODE MODIFIER DAILY ACTIVITY: CK
DRESSING REGULAR UPPER BODY CLOTHING: A LOT
DRESSING REGULAR LOWER BODY CLOTHING: A LOT
SUGGESTED CMS G CODE MODIFIER DAILY ACTIVITY: CK
CLIMB 3 TO 5 STEPS WITH RAILING: TOTAL
STANDING UP FROM CHAIR USING ARMS: TOTAL
DAILY ACTIVITIY SCORE: 16
TOILETING: A LOT
MOBILITY SCORE: 7
SUGGESTED CMS G CODE MODIFIER MOBILITY: CM
WALKING IN HOSPITAL ROOM: TOTAL
PERSONAL GROOMING: A LOT
TURNING FROM BACK TO SIDE WHILE IN FLAT BAD: A LOT
TOILETING: A LOT

## 2022-07-13 ASSESSMENT — PATIENT HEALTH QUESTIONNAIRE - PHQ9
2. FEELING DOWN, DEPRESSED, IRRITABLE, OR HOPELESS: NOT AT ALL
SUM OF ALL RESPONSES TO PHQ9 QUESTIONS 1 AND 2: 0
1. LITTLE INTEREST OR PLEASURE IN DOING THINGS: NOT AT ALL

## 2022-07-13 ASSESSMENT — ENCOUNTER SYMPTOMS
FEVER: 0
COUGH: 0
CHILLS: 0
PALPITATIONS: 0
ABDOMINAL PAIN: 1
LOSS OF CONSCIOUSNESS: 0
VOMITING: 0
SORE THROAT: 0
DIARRHEA: 0
BACK PAIN: 1
DIZZINESS: 0
HEADACHES: 0
NAUSEA: 0
DOUBLE VISION: 0
BLURRED VISION: 0
SHORTNESS OF BREATH: 0
MYALGIAS: 1

## 2022-07-13 ASSESSMENT — PAIN DESCRIPTION - PAIN TYPE
TYPE: ACUTE PAIN

## 2022-07-13 ASSESSMENT — ACTIVITIES OF DAILY LIVING (ADL): TOILETING: INDEPENDENT

## 2022-07-13 NOTE — PROGRESS NOTES
4 Eyes Skin Assessment Completed by DANYEL Hood and DANYEL Beal.    Head WDL  Ears WDL  Nose WDL  Mouth WDL  Neck WDL  Breast/Chest WDL  Shoulder Blades WDL  Spine WDL  (R) Arm/Elbow/Hand WDL  (L) Arm/Elbow/Hand WDL  Abdomen WDL  Groin WDL  Scrotum/Coccyx/Buttocks WDL  (R) Leg Scar from previous skin graft, healed  (L) Leg Scar from  Previous skin graft, healed  (R) Heel/Foot/Toe Discoloration  (L) Heel/Foot/Toe Discoloration          Devices In Places Pulse Ox and Nasal Cannula      Interventions In Place Sacral Mepilex, Q2 Turns and Pressure Redistribution Mattress    Possible Skin Injury No    Pictures Uploaded Into Epic N/A  Wound Consult Placed N/A  RN Wound Prevention Protocol Ordered No

## 2022-07-13 NOTE — CARE PLAN
The patient is Stable - Low risk of patient condition declining or worsening    Shift Goals  Clinical Goals: Pain control; monitor vital signs  Patient Goals: Comfort, rest  Family Goals: Updates on patient condition    Progress made toward(s) clinical / shift goals:    Problem: Knowledge Deficit - Standard  Goal: Patient and family/care givers will demonstrate understanding of plan of care, disease process/condition, diagnostic tests and medications  Outcome: Progressing     Problem: Pain - Standard  Goal: Alleviation of pain or a reduction in pain to the patient’s comfort goal  Outcome: Progressing     Problem: Skin Integrity  Goal: Skin integrity is maintained or improved  Outcome: Progressing       Patient is not progressing towards the following goals:

## 2022-07-13 NOTE — DISCHARGE INSTRUCTIONS
Discharge Instructions    Discharged to other by medical transportation with escort. Discharged via wheelchair, hospital escort: Yes.  Special equipment needed: Not Applicable    Be sure to schedule a follow-up appointment with your primary care doctor or any specialists as instructed.     Discharge Plan:        I understand that a diet low in cholesterol, fat, and sodium is recommended for good health. Unless I have been given specific instructions below for another diet, I accept this instruction as my diet prescription.   Other diet: n/a  -Is this patient being discharged with medication to prevent blood clots?  No    Is patient discharged on Warfarin / Coumadin?   No     Bacteremia, Adult  Bacteremia is the presence of bacteria in the blood. When bacteria enter the bloodstream, they can cause a life-threatening reaction called sepsis, which is a medical emergency. Bacteremia can spread to other parts of the body, including the heart, joints, and brain.  What are the causes?  This condition is caused by bacteria that get into the blood.  Bacteria can enter the blood:  From a skin infection or injury, such as a burn or a cut.  From a lung infection (pneumonia).  From an infection in your stomach or intestines (gastrointestinal infection).  From an infection in your bladder or urinary system (urinary tract infection).  During a dental or medical procedure.  From bleeding gums.  When a bacterial infection in another part of your body spreads to your blood.  Through an unclean (contaminated) needle.  What increases the risk?  This condition is more likely to develop in children, the elderly, and people who:  Have a long-term (chronic) disease or condition like diabetes or chronic kidney failure.  Have an artificial joint or heart valve.  Have heart valve disease.  Have a tube inserted to treat a medical condition, such as a urinary catheter or IV.  Have a weak disease-fighting system (immune system).  Inject illegal  drugs.  Have been hospitalized for more than 10 days in a row.  What are the signs or symptoms?  Symptoms of this condition include:  Fever.  Chills.  Fast heartbeat.  Shortness of breath.  Dizziness.  Weakness.  Confusion.  Nausea or vomiting.  Diarrhea.  Low blood pressure.  Decreased urine output.  Bacteremia that has spread to other parts of the body may cause symptoms in those areas. In some cases, there are no symptoms.  How is this diagnosed?  This condition may be diagnosed with a physical exam and tests, such as:  A complete blood count (CBC). This test checks for signs of infection.  Blood cultures. These check for bacteria in your blood.  Tests of any tubes that you have had inserted. These tests check for a source of infection.  Urine tests, including urine cultures. These check for bacteria in the urine that could be a source of infection.  Imaging tests, such as an X-ray, CT scan, MRI, or heart ultrasound. These check for a source of infection in other parts of your body, such as your lungs, heart valves, or joints.  How is this treated?  This condition is usually treated in the hospital. Treatment may involve:  Antibiotic medicines. These may be given by mouth (orally) or directly into your blood through an IV (infusion through your vein).  Depending on the source of infection, you may need antibiotics for several weeks.  At first, you may be given an antibiotic to kill most types of blood bacteria (broad-spectrum antibiotic). If your test results show that a certain kind of bacteria is causing the problem, you may be given a different antibiotic to kill that specific bacteria.  IV fluids.  Removing any catheter or device that could be a source of infection.  Blood pressure and breathing support, if needed.  Surgery to control the source or the spread of infection, such as surgery to remove an infected device, abscess, or tissue.  Having follow-up visits for medicines, blood tests, and further  evaluation.  Follow these instructions at home:  Medicines  Take over-the-counter and prescription medicines only as told by your health care provider.  If you were prescribed an antibiotic medicine, take it as told by your health care provider. Do not stop taking the antibiotic even if you start to feel better.  General instructions    Rest as needed. Ask your health care provider when you may return to normal activities.  Drink enough fluid to keep your urine pale yellow.  Do not use any products that contain nicotine or tobacco, such as cigarettes and e-cigarettes. If you need help quitting, ask your health care provider.  Keep all follow-up visits as told by your health care provider. This is important.  How is this prevented?    Wash your hands regularly with soap and water. If soap and water are not available, use hand .  You should wash your hands:  After using the toilet or changing a diaper.  Before preparing, cooking, or serving food.  While caring for a sick person or while visiting someone in a hospital.  Before and after changing bandages (dressings) over wounds.  Clean any scrapes or cuts with soap and water and cover them with clean dressings.  Get vaccinations as recommended by your health care provider.  Practice good oral hygiene. Brush your teeth two times a day, and floss regularly.  Take good care of your skin. This includes bathing and moisturizing on a regular basis.  Get help right away if you have:  Pain.  A fever or chills.  Trouble breathing.  A fast heart rate.  Skin that is blotchy, pale, or clammy.  Confusion.  Weakness.  Lack of energy (lethargy) or unusual sleepiness.  Diarrhea.  New symptoms that develop after treatment has started.  These symptoms may represent a serious problem that is an emergency. Do not wait to see if the symptoms will go away. Get medical help right away. Call your local emergency services (911 in the [place] Do not drive yourself to the  hospital.  Summary  Bacteremia is the presence of bacteria in the blood. When bacteria enter the bloodstream, they can cause a life-threatening reaction called sepsis.  Some symptoms of bacteremia include fever, chills, shortness of breath, confusion, nausea or vomiting, and diarrhea.  Tests may be done to find the source of infection that led to bacteremia. These tests may include blood tests, urine tests, and imaging tests.  Bacteremia is usually treated with antibiotic medicines in the hospital.  Get help right away if you have any new symptoms that develop after treatment has started.  This information is not intended to replace advice given to you by your health care provider. Make sure you discuss any questions you have with your health care provider.  Document Released: 10/01/2007 Document Revised: 2019 Document Reviewed: 2019  [employer] Patient Education ©  [employer]    Ceftriaxone injection  What is this medicine?  CEFTRIAXONE (sef try AX one) is a cephalosporin antibiotic. It is used to treat certain kinds of bacterial infections. It will not work for colds, flu, or other viral infections.  This medicine may be used for other purposes; ask your health care provider or pharmacist if you have questions.  COMMON BRAND NAME(S): Ceftrisol Plus, Rocephin  What should I tell my health care provider before I take this medicine?  They need to know if you have any of these conditions:  any chronic illness  bowel disease, like colitis  both kidney and liver disease  high bilirubin level in  patients  an unusual or allergic reaction to ceftriaxone, other cephalosporin or penicillin antibiotics, foods, dyes, or preservatives  pregnant or trying to get pregnant  breast-feeding  How should I use this medicine?  This medicine is injected into a muscle or infused it into a vein. It is usually given in a medical office or clinic. If you are to give this medicine you will be taught how to inject it.  Follow instructions carefully. Use your doses at regular intervals. Do not take your medicine more often than directed. Do not skip doses or stop your medicine early even if you feel better. Do not stop taking except on your doctor's advice.  Talk to your pediatrician regarding the use of this medicine in children. Special care may be needed.  Overdosage: If you think you have taken too much of this medicine contact a poison control center or emergency room at once.  NOTE: This medicine is only for you. Do not share this medicine with others.  What if I miss a dose?  If you miss a dose, take it as soon as you can. If it is almost time for your next dose, take only that dose. Do not take double or extra doses.  What may interact with this medicine?  Do not take this medicine with any of the following medications:  intravenous calcium  This medicine may also interact with the following medications:  birth control pills  This list may not describe all possible interactions. Give your health care provider a list of all the medicines, herbs, non-prescription drugs, or dietary supplements you use. Also tell them if you smoke, drink alcohol, or use illegal drugs. Some items may interact with your medicine.  What should I watch for while using this medicine?  Tell your doctor or health care provider if your symptoms do not improve or if they get worse.  This medicine may cause serious skin reactions. They can happen weeks to months after starting the medicine. Contact your health care provider right away if you notice fevers or flu-like symptoms with a rash. The rash may be red or purple and then turn into blisters or peeling of the skin. Or, you might notice a red rash with swelling of the face, lips or lymph nodes in your neck or under your arms.  Do not treat diarrhea with over the counter products. Contact your doctor if you have diarrhea that lasts more than 2 days or if it is severe and watery.  If you are being  treated for a sexually transmitted disease, avoid sexual contact until you have finished your treatment. Having sex can infect your sexual partner.  Calcium may bind to this medicine and cause lung or kidney problems. Avoid calcium products while taking this medicine and for 48 hours after taking the last dose of this medicine.  What side effects may I notice from receiving this medicine?  Side effects that you should report to your doctor or health care professional as soon as possible:  allergic reactions like skin rash, itching or hives, swelling of the face, lips, or tongue  breathing problems  fever, chills  irregular heartbeat  pain when passing urine  redness, blistering, peeling, or loosening of the skin, including inside the mouth  seizures  stomach pain, cramps  unusual bleeding, bruising  unusually weak or tired  Side effects that usually do not require medical attention (report to your doctor or health care professional if they continue or are bothersome):  diarrhea  dizzy, drowsy  headache  nausea, vomiting  pain, swelling, irritation where injected  stomach upset  sweating  This list may not describe all possible side effects. Call your doctor for medical advice about side effects. You may report side effects to FDA at 3-315-FDA-2587.  Where should I keep my medicine?  Keep out of the reach of children.  Store at room temperature below 25 degrees C (77 degrees F). Protect from light. Throw away any unused vials after the expiration date.  NOTE: This sheet is a summary. It may not cover all possible information. If you have questions about this medicine, talk to your doctor, pharmacist, or health care provider.  © 2020 [employer] (2020-03-20 10:10:06)

## 2022-07-13 NOTE — THERAPY
"Occupational Therapy   Initial Evaluation     Patient Name: April Alicia  Age:  62 y.o., Sex:  female  Medical Record #: [id number]  Today's Date: 7/13/2022    Precautions: (P) Fall Risk    Assessment  Patient is 62 y.o. female admitted with hypotension, hyponatremia, sepsis, and AMS with Hx of ETOH, cirrhosis, asthma, Afib seen for OT evaluation. Pt demonstrated poor insight, confusion, difficulty attending, poor pain control, weakness, and poor balance. Pt required max A to EOB 2/2 complaints of pain \"all over\", and max A for self cares. Difficult to obtain PLOF 2/2 confusion and fixation on pain, however per PT note, pt lives with a friend who can assist as needed (pt reported she lived alone). Recommend post acute placement at this time. Will follow for skilled OT services.    Plan    Recommend Occupational Therapy 3 times per week until therapy goals are met for the following treatments:  Adaptive Equipment, Cognitive Skill Development, Neuro Re-Education / Balance, Self Care/Activities of Daily Living, Sensory Integration Techniques, Therapeutic Activities and Therapeutic Exercises.    DC Equipment Recommendations: (P) Unable to determine at this time  Discharge Recommendations: (P) Recommend post-acute placement for additional occupational therapy services prior to discharge home        07/13/22 1101   Prior Living Situation   Prior Services Unable To Determine At This Time   Housing / Facility 2 Story Apartment / Condo   Bathroom Set up Walk In Shower   Equipment Owned Single Point Cane   Lives with - Patient's Self Care Capacity Alone and Able to Care For Self   Comments Pt unreliable historian. Pt reported she lives [facility] at [facility]   Prior Level of ADL Function   Self Feeding Independent   Grooming / Hygiene Independent   Bathing Independent   Dressing Independent   Toileting Independent   Prior Level of IADL Function   Medication Management Unable To Determine At This Time   Laundry Unable To " "Determine At This Time   Kitchen Mobility Unable To Determine At This Time   Finances Unable To Determine At This Time   Home Management Unable To Determine At This Time   Shopping Unable To Determine At This Time   Precautions   Precautions Fall Risk   Non Verbal Descriptors   Non Verbal Scale  Crying  (pain \"all over with movement\")   Cognition    Cognition / Consciousness X   Speech/ Communication Delayed Responses   Safety Awareness Impaired   New Learning Impaired   Attention Impaired   Comments Pt distracted by pain, lacked insight, appeared very confused   Active ROM Upper Body   Active ROM Upper Body  X   Comments limited LUE ROM   Strength Upper Body   Upper Body Strength  X   Comments RUE 3/5 strength, difficult to assess LUE 2/2 pain   Balance Assessment   Sitting Balance (Static) Fair -   Sitting Balance (Dynamic) Poor +   Weight Shift Sitting Poor   Bed Mobility    Supine to Sit Maximal Assist   Sit to Supine Maximal Assist   Scooting Maximal Assist   Comments yelling in pain during bed mobility, attempted EOB position 4x, able to sit EOB on 4th attempt with 2pa   ADL Assessment   Grooming Moderate Assist;Seated   Lower Body Dressing Maximal Assist   Toileting Maximal Assist   How much help from another person does the patient currently need...   6 Clicks Daily Activity Score 14   Functional Mobility   Comments declined STS 2/2 pain   Patient / Family Goals   Patient / Family Goal #1 to go back to bed   Short Term Goals   Short Term Goal # 1 Pt will tolerate seated ADLs for 10 min unsupported EOB   Short Term Goal # 2 Pt will demo functional txfs min A   Short Term Goal # 3 Pt will participate in HEP to increased BUE strength   Education Group   Role of Occupational Therapist Patient Response Patient;Acceptance;Reinforcement Needed   Problem List   Problem List Decreased Active Daily Living Skills;Decreased Homemaking Skills;Decreased Upper Extremity Strength Right;Decreased Upper Extremity Strength " Left;Decreased Upper Extremity AROM Left;Decreased Functional Mobility;Decreased Activity Tolerance;Safety Awareness Deficits / Cognition;Impaired Cognitive Function;Impaired Postural Control / Balance   Anticipated Discharge Equipment and Recommendations   DC Equipment Recommendations Unable to determine at this time   Discharge Recommendations Recommend post-acute placement for additional occupational therapy services prior to discharge home

## 2022-07-13 NOTE — ASSESSMENT & PLAN NOTE
Acute on chronic  Iron study consistent with iron deficiency  Continue  iron supplements  Check FOBT given remote hx of gastric ulcer and abdominal pain. On Eliquis. On PPI.  No melena or hematochezia  Repeat labs in am  Likely 2/2 to Rocephin given pancytopenia.

## 2022-07-13 NOTE — CONSULTS
Brief ID note:    ID consult for group B streptococcal bacteremia requested by hospitalist, Dr. Bishop    Chart reviewed.  Patient is a 62-year-old woman with a history of cocaine and alcohol abuse, alcoholic cirrhosis, atrial fibrillation on apixaban and type 2 diabetes mellitus admitted on 7/10/2022 secondary to altered mentation and hypotension.  Urinalysis was consistent with infection and blood cultures on admission are growing group B streptococcus.  Patient is currently on Unasyn.    -Continue IV Unasyn for now  - Follow repeat blood cultures x2  -Follow echo    Full consult to follow tomorrow

## 2022-07-13 NOTE — PROGRESS NOTES
Patient arrived to room on ICU hospital bed with transport.  Transferred patient over to regular hospital bed with slide board.  On 2L nasal cannula, maintaining adequate oxygenation.  Oriented patient to room.  Educated to use call light for any assistance.

## 2022-07-13 NOTE — CARE PLAN
The patient is Stable - Low risk of patient condition declining or worsening    Shift Goals  Clinical Goals: IV ABX, ECHO ordered, PT/OT  Patient Goals: Family to come visit her  Family Goals: EDMUND    Progress made toward(s) clinical / shift goals:        Problem: Knowledge Deficit - Standard  Goal: Patient and family/care givers will demonstrate understanding of plan of care, disease process/condition, diagnostic tests and medications  Outcome: Progressing     Problem: Pain - Standard  Goal: Alleviation of pain or a reduction in pain to the patient’s comfort goal  Outcome: Progressing     Problem: Skin Integrity  Goal: Skin integrity is maintained or improved  Outcome: Progressing     Problem: Fall Risk  Goal: Patient will remain free from falls  Outcome: Progressing       Patient is not progressing towards the following goals:

## 2022-07-13 NOTE — PROGRESS NOTES
Notified NELLA Borja that patient c/o 10/10 pain in her left shoulder.  Received order for lidocaine patch and fentanyl.  Also notified NELLA Borja that Na on 7/12 in the am was 127 and no current order to draw again this am.  Received order for BMP.

## 2022-07-13 NOTE — PROGRESS NOTES
[facility] Medicine Daily Progress Note    Date of Service  7/13/2022    Chief Complaint  April Alicia is a 62 y.o. female admitted 7/10/2022 with hypotension, AMS    Hospital Course  61yo PMHx coccaine and ETOH abuse though sober x 8 years per her report, cirrhosis, asthma, AFib on apixaban, DM, HTN. Took an unknown narcotic.  In ED responded to narcan, labs showing Na 121, Cl 89, Creat 5.35, CXR showing cardiomegally.  Admitted to the floor with AMS, HypoNa, dehydration, sepsis from urinary source.  Admitted to the floor.  Later on day of admission transferred to IMCU for hypotension.  Subsequent blood cultures + Strep Species    Interval Problem Update  Patient reported all over body pain, especially left upper arm, legs, back and abdomen.  Reported nausea vomiting.  No diarrhea or constipation.     Hyponatremia-improving  BP in higher range-discontinue midodrine  Potassium 3.5 -replaced  Bacteremia with group B -on Unasyn, follow-up sensitivity, repeated blood culture-consulted ID  ELIZABETH-resolved  Reported knee pain -ordered x-ray    Iron deficiency anemia -holding IV iron replacement given ongoing infection    Likely need SNF, ordered  LUE swelling -ultrasound negative for DVT    I have discussed this patient's plan of care and discharge plan at IDT rounds today with Case Management, Nursing, Nursing leadership, and other members of the IDT team.    Consultants/Specialty      Code Status  Full Code    Disposition  Patient is not medically cleared for discharge.   Anticipate discharge to to home with close outpatient follow-up.  I have placed the appropriate orders for post-discharge needs.    Review of Systems  Review of Systems   Unable to perform ROS: Mental status change   Constitutional: Positive for malaise/fatigue. Negative for chills and fever.   HENT: Negative for nosebleeds and sore throat.    Eyes: Negative for blurred vision and double vision.   Respiratory: Negative for cough and shortness  of breath.    Cardiovascular: Negative for chest pain, palpitations and leg swelling.   Gastrointestinal: Positive for abdominal pain. Negative for diarrhea, nausea and vomiting.   Genitourinary: Positive for dysuria.   Musculoskeletal: Positive for back pain, joint pain and myalgias.        L arm pain   Skin: Negative for rash.   Neurological: Negative for dizziness, loss of consciousness and headaches.        Physical Exam  Temp:  [36.4 °C (97.5 °F)-37 °C (98.6 °F)] 36.4 °C (97.5 °F)  Pulse:  [50-72] 51  Resp:  [11-26] 20  BP: (130-159)/(63-73) 159/73  SpO2:  [95 %-98 %] 95 %    Physical Exam  Vitals reviewed.   Constitutional:       General: She is not in acute distress.     Appearance: She is well-developed. She is ill-appearing. She is not diaphoretic.   HENT:      Head: Normocephalic and atraumatic.      Mouth/Throat:      Mouth: Mucous membranes are moist.   Eyes:      Extraocular Movements: Extraocular movements intact.      Conjunctiva/sclera: Conjunctivae normal.   Neck:      Vascular: No JVD.   Cardiovascular:      Rate and Rhythm: Normal rate and regular rhythm.      Pulses: Normal pulses.   Pulmonary:      Effort: Pulmonary effort is normal. No respiratory distress.      Breath sounds: No stridor. No wheezing or rales.   Abdominal:      Palpations: Abdomen is soft.      Tenderness: There is no abdominal tenderness. There is no guarding or rebound.   Musculoskeletal:         General: No tenderness.      Cervical back: Neck supple. No rigidity or tenderness.      Right lower leg: No edema.      Left lower leg: No edema.      Comments: edema L arm  No TTP   Skin:     General: Skin is warm and dry.      Comments: BL LE skin graft with pigmentation.  No erythema or swelling.    Neurological:      Mental Status: She is alert and oriented to person, place, and time. Mental status is at baseline.   Psychiatric:      Comments: Anxious         Fluids    Intake/Output Summary (Last 24 hours) at 7/13/2022  1219  Last data filed at 7/13/2022 0800  Gross per 24 hour   Intake 120 ml   Output --   Net 120 ml       Laboratory  Recent Labs     07/10/22  1535 07/11/22  0433 07/12/22  0354   WBC 13.5* 16.7* 17.5*   RBC 3.84* 3.90* 3.78*   HEMOGLOBIN 10.7* 11.0* 10.8*   HEMATOCRIT 33.1* 33.6* 31.3*   MCV 86.2 86.2 82.8   MCH 27.9 28.2 28.6   MCHC 32.3* 32.7* 34.5   RDW 48.1 47.8 44.7   PLATELETCT 130* 125* 124*   MPV 9.9 9.6 9.4     Recent Labs     07/11/22  1836 07/12/22  0354 07/13/22  0555   SODIUM 124* 127* 133*   POTASSIUM 4.7 4.5 3.5*   CHLORIDE 97 98 104   CO2 15* 17* 20   GLUCOSE 185* 163* 127*   BUN 55* 57* 46*   CREATININE 2.43* 1.93* 1.03   CALCIUM 7.2* 7.5* 7.9*     Recent Labs     07/11/22  0433   APTT 51.8*   INR 1.80*               Imaging  US-EXTREMITY VENOUS UPPER UNILAT LEFT   Final Result      US-RENAL   Final Result      1.  Unremarkable kidneys.   2.  Limited evaluation of the bladder.      CT-ABDOMEN-PELVIS W/O   Final Result         Limited exam due to lack of IV contrast.         1. Mild diffuse wall thickening of the urinary bladder could relate to infection or inflammation. Correlate with UA. No hydronephrosis.   2. Nonspecific mildly prominent fluid-filled proximal small bowel in the left abdomen. This could be seen with enteritis, focal ileus or early obstruction.   3. Cirrhotic liver with portal hypertension and splenomegaly. Wall thickening of the ascending colon could relate to portal hypertension.   4. No ascites.      DX-SHOULDER 2+ LEFT   Final Result      1.  No evidence of acute fracture or dislocation.      2.  Old posttraumatic changes of the distal clavicle.      DX-CHEST-PORTABLE (1 VIEW)   Final Result      Cardiomegaly.      EC-ECHOCARDIOGRAM COMPLETE W/O CONT    (Results Pending)   DX-KNEE 3 VIEWS LEFT    (Results Pending)   DX-KNEE 3 VIEWS RIGHT    (Results Pending)        Assessment/Plan  * Bacteremia  Assessment & Plan  2 bottles with group B strep await sensitivity and further  specification  Patient reported history of severe bacteremia a year ago after knee surgery last year, which required skin grafting of both lower extremity and was hospitalized at [place] when she visited her son there    Cont amp/sulbactam  Check TTE  Repeat blood cultures  Consulted ID      Hyponatremia  Assessment & Plan  123 on admission  High SG on UA: probable hypovolemic and alcohol use  Cont to monitor    Improving    Acute cystitis without hematuria  Assessment & Plan  Urine is equivocal  Follow culture    Iron deficiency anemia  Assessment & Plan  Iron study consistent with iron deficiency  Hgb 10.7 at admission, stable    holding IV iron replacement given ongoing infection    Metabolic encephalopathy  Assessment & Plan  Likely multifactorial in conjunction with narcotic use  S/p Narcan treatment, improved  Start lacutlose  Cont to treat infection     resolved      Hyperlipidemia- (present on admission)  Assessment & Plan  Continue Lipitor    ELIZABETH (acute kidney injury) (HCC)- (present on admission)  Assessment & Plan  Likely secondary dehydration  4.7 on admission   Good UOP  DC IVFs  Cont to monitor  Renally dose medications as appropriate    Resolved    Atrial fibrillation (HCC)- (present on admission)  Assessment & Plan  On apixaban and dronedarone as outpt    Cirrhosis (HCC)- (present on admission)  Assessment & Plan  Secondary to ETOH  Lactulose  monitor    Hypertension- (present on admission)  Assessment & Plan  Medication hold secondary to hypotension, shock    Controlled type 2 diabetes mellitus with hyperglycemia, without long-term current use of insulin (HCC)- (present on admission)  Assessment & Plan  Sliding-scale insulin, diabetic diet  Running mid to high 100s  Send A1c  On metformin  As outpt       VTE prophylaxis: enoxaparin ppx    I have performed a physical exam and reviewed and updated ROS and Plan today (7/13/2022). In review of yesterday's note (7/12/2022), there are no changes except as  documented above.

## 2022-07-13 NOTE — CARE PLAN
The patient is Stable - Low risk of patient condition declining or worsening    Shift Goals  Clinical Goals: iv abx, safety, comfort, [id number]  Patient Goals: work with PT to walk  Family Goals: n/a    Progress made toward(s) clinical / shift goals:    Problem: Skin Integrity  Goal: Skin integrity is maintained or improved  Outcome: Progressing  Note: Waffle overlay placed on bed.  Sacral mepilex in place.       Patient is not progressing towards the following goals:      Problem: Pain - Standard  Goal: Alleviation of pain or a reduction in pain to the patient’s comfort goal  Outcome: Not Progressing  Note: Patient received tylenol for pain at 2300.  Then called at 0300 that pain 10/10.  Received fentanyl per order, will reassess pain

## 2022-07-14 ENCOUNTER — APPOINTMENT (OUTPATIENT)
Dept: CARDIOLOGY | Facility: MEDICAL CENTER | Age: 62
DRG: 466 | End: 2022-07-14
Attending: STUDENT IN AN ORGANIZED HEALTH CARE EDUCATION/TRAINING PROGRAM
Payer: MEDICAID

## 2022-07-14 ENCOUNTER — APPOINTMENT (OUTPATIENT)
Dept: RADIOLOGY | Facility: MEDICAL CENTER | Age: 62
DRG: 466 | End: 2022-07-14
Payer: MEDICAID

## 2022-07-14 PROBLEM — M25.562 LEFT KNEE PAIN: Status: ACTIVE | Noted: 2022-07-14

## 2022-07-14 LAB
ANION GAP SERPL CALC-SCNC: 11 MMOL/L (ref 7–16)
BUN SERPL-MCNC: 28 MG/DL (ref 8–22)
C DIFF DNA SPEC QL NAA+PROBE: NEGATIVE
C DIFF TOX GENS STL QL NAA+PROBE: NEGATIVE
CALCIUM SERPL-MCNC: 8.1 MG/DL (ref 8.5–10.5)
CHLORIDE SERPL-SCNC: 105 MMOL/L (ref 96–112)
CO2 SERPL-SCNC: 18 MMOL/L (ref 20–33)
CREAT SERPL-MCNC: 0.69 MG/DL (ref 0.5–1.4)
ERYTHROCYTE [DISTWIDTH] IN BLOOD BY AUTOMATED COUNT: 44.4 FL (ref 35.9–50)
GFR SERPLBLD CREATININE-BSD FMLA CKD-EPI: 98 ML/MIN/1.73 M 2
GLUCOSE BLD STRIP.AUTO-MCNC: 144 MG/DL (ref 65–99)
GLUCOSE BLD STRIP.AUTO-MCNC: 181 MG/DL (ref 65–99)
GLUCOSE BLD STRIP.AUTO-MCNC: 187 MG/DL (ref 65–99)
GLUCOSE SERPL-MCNC: 151 MG/DL (ref 65–99)
HCT VFR BLD AUTO: 34.6 % (ref 37–47)
HGB BLD-MCNC: 11.8 G/DL (ref 12–16)
LV EJECT FRACT  99904: 60
LV EJECT FRACT MOD 2C 99903: 72.45
LV EJECT FRACT MOD 4C 99902: 65.67
LV EJECT FRACT MOD BP 99901: 68.19
MAGNESIUM SERPL-MCNC: 1.5 MG/DL (ref 1.5–2.5)
MCH RBC QN AUTO: 28.2 PG (ref 27–33)
MCHC RBC AUTO-ENTMCNC: 34.1 G/DL (ref 33.6–35)
MCV RBC AUTO: 82.8 FL (ref 81.4–97.8)
NT-PROBNP SERPL IA-MCNC: 2542 PG/ML (ref 0–125)
PHOSPHATE SERPL-MCNC: 2.4 MG/DL (ref 2.5–4.5)
PLATELET # BLD AUTO: 111 K/UL (ref 164–446)
PMV BLD AUTO: 9.2 FL (ref 9–12.9)
POTASSIUM SERPL-SCNC: 3.5 MMOL/L (ref 3.6–5.5)
RBC # BLD AUTO: 4.18 M/UL (ref 4.2–5.4)
SODIUM SERPL-SCNC: 134 MMOL/L (ref 135–145)
WBC # BLD AUTO: 9 K/UL (ref 4.8–10.8)

## 2022-07-14 PROCEDURE — 83735 ASSAY OF MAGNESIUM: CPT

## 2022-07-14 PROCEDURE — 700105 HCHG RX REV CODE 258: Performed by: HOSPITALIST

## 2022-07-14 PROCEDURE — 85027 COMPLETE CBC AUTOMATED: CPT

## 2022-07-14 PROCEDURE — 700102 HCHG RX REV CODE 250 W/ 637 OVERRIDE(OP): Performed by: STUDENT IN AN ORGANIZED HEALTH CARE EDUCATION/TRAINING PROGRAM

## 2022-07-14 PROCEDURE — 99232 SBSQ HOSP IP/OBS MODERATE 35: CPT | Performed by: STUDENT IN AN ORGANIZED HEALTH CARE EDUCATION/TRAINING PROGRAM

## 2022-07-14 PROCEDURE — 99255 IP/OBS CONSLTJ NEW/EST HI 80: CPT | Mod: GC | Performed by: INTERNAL MEDICINE

## 2022-07-14 PROCEDURE — 94640 AIRWAY INHALATION TREATMENT: CPT

## 2022-07-14 PROCEDURE — 71045 X-RAY EXAM CHEST 1 VIEW: CPT

## 2022-07-14 PROCEDURE — A9270 NON-COVERED ITEM OR SERVICE: HCPCS | Performed by: STUDENT IN AN ORGANIZED HEALTH CARE EDUCATION/TRAINING PROGRAM

## 2022-07-14 PROCEDURE — 700111 HCHG RX REV CODE 636 W/ 250 OVERRIDE (IP)

## 2022-07-14 PROCEDURE — 87493 C DIFF AMPLIFIED PROBE: CPT

## 2022-07-14 PROCEDURE — 93306 TTE W/DOPPLER COMPLETE: CPT

## 2022-07-14 PROCEDURE — 700101 HCHG RX REV CODE 250

## 2022-07-14 PROCEDURE — 80048 BASIC METABOLIC PNL TOTAL CA: CPT

## 2022-07-14 PROCEDURE — 770001 HCHG ROOM/CARE - MED/SURG/GYN PRIV*

## 2022-07-14 PROCEDURE — 700111 HCHG RX REV CODE 636 W/ 250 OVERRIDE (IP): Performed by: INTERNAL MEDICINE

## 2022-07-14 PROCEDURE — 84100 ASSAY OF PHOSPHORUS: CPT

## 2022-07-14 PROCEDURE — 36415 COLL VENOUS BLD VENIPUNCTURE: CPT

## 2022-07-14 PROCEDURE — 93306 TTE W/DOPPLER COMPLETE: CPT | Mod: 26 | Performed by: INTERNAL MEDICINE

## 2022-07-14 PROCEDURE — A9270 NON-COVERED ITEM OR SERVICE: HCPCS | Performed by: HOSPITALIST

## 2022-07-14 PROCEDURE — 700111 HCHG RX REV CODE 636 W/ 250 OVERRIDE (IP): Performed by: HOSPITALIST

## 2022-07-14 PROCEDURE — 700102 HCHG RX REV CODE 250 W/ 637 OVERRIDE(OP): Performed by: HOSPITALIST

## 2022-07-14 PROCEDURE — 82962 GLUCOSE BLOOD TEST: CPT | Mod: 91

## 2022-07-14 PROCEDURE — 83880 ASSAY OF NATRIURETIC PEPTIDE: CPT

## 2022-07-14 RX ORDER — KETOROLAC TROMETHAMINE 30 MG/ML
15 INJECTION, SOLUTION INTRAMUSCULAR; INTRAVENOUS
Status: COMPLETED | OUTPATIENT
Start: 2022-07-14 | End: 2022-07-14

## 2022-07-14 RX ORDER — OXYCODONE HYDROCHLORIDE 5 MG/1
5 TABLET ORAL EVERY 4 HOURS PRN
Status: DISCONTINUED | OUTPATIENT
Start: 2022-07-14 | End: 2022-07-25

## 2022-07-14 RX ORDER — LOPERAMIDE HYDROCHLORIDE 2 MG/1
2 CAPSULE ORAL 4 TIMES DAILY PRN
Status: DISCONTINUED | OUTPATIENT
Start: 2022-07-14 | End: 2022-08-04 | Stop reason: HOSPADM

## 2022-07-14 RX ADMIN — DIBASIC SODIUM PHOSPHATE, MONOBASIC POTASSIUM PHOSPHATE AND MONOBASIC SODIUM PHOSPHATE 500 MG: 852; 155; 130 TABLET ORAL at 12:20

## 2022-07-14 RX ADMIN — SODIUM CHLORIDE 3 G: 900 INJECTION INTRAVENOUS at 06:01

## 2022-07-14 RX ADMIN — INSULIN HUMAN 3 UNITS: 100 INJECTION, SOLUTION PARENTERAL at 17:41

## 2022-07-14 RX ADMIN — INSULIN HUMAN 3 UNITS: 100 INJECTION, SOLUTION PARENTERAL at 23:51

## 2022-07-14 RX ADMIN — DIBASIC SODIUM PHOSPHATE, MONOBASIC POTASSIUM PHOSPHATE AND MONOBASIC SODIUM PHOSPHATE 500 MG: 852; 155; 130 TABLET ORAL at 17:47

## 2022-07-14 RX ADMIN — DIBASIC SODIUM PHOSPHATE, MONOBASIC POTASSIUM PHOSPHATE AND MONOBASIC SODIUM PHOSPHATE 500 MG: 852; 155; 130 TABLET ORAL at 08:37

## 2022-07-14 RX ADMIN — DRONEDARONE 400 MG: 400 TABLET, FILM COATED ORAL at 17:42

## 2022-07-14 RX ADMIN — SODIUM CHLORIDE 3 G: 900 INJECTION INTRAVENOUS at 00:22

## 2022-07-14 RX ADMIN — CEFTRIAXONE SODIUM 2 G: 10 INJECTION, POWDER, FOR SOLUTION INTRAVENOUS at 12:21

## 2022-07-14 RX ADMIN — LIDOCAINE 1 PATCH: 700 PATCH TOPICAL at 03:22

## 2022-07-14 RX ADMIN — DRONEDARONE 400 MG: 400 TABLET, FILM COATED ORAL at 08:37

## 2022-07-14 RX ADMIN — LOPERAMIDE HYDROCHLORIDE 2 MG: 2 CAPSULE ORAL at 15:26

## 2022-07-14 RX ADMIN — KETOROLAC TROMETHAMINE 15 MG: 30 INJECTION, SOLUTION INTRAMUSCULAR at 12:21

## 2022-07-14 RX ADMIN — INSULIN HUMAN 3 UNITS: 100 INJECTION, SOLUTION PARENTERAL at 12:00

## 2022-07-14 RX ADMIN — LOPERAMIDE HYDROCHLORIDE 2 MG: 2 CAPSULE ORAL at 17:47

## 2022-07-14 RX ADMIN — ALBUTEROL SULFATE 2 PUFF: 90 AEROSOL, METERED RESPIRATORY (INHALATION) at 00:49

## 2022-07-14 ASSESSMENT — ENCOUNTER SYMPTOMS
CHILLS: 0
VOMITING: 0
LOSS OF CONSCIOUSNESS: 0
PALPITATIONS: 0
COUGH: 0
BACK PAIN: 1
ABDOMINAL PAIN: 1
DIARRHEA: 0
NAUSEA: 0
DIZZINESS: 0
DOUBLE VISION: 0
BLURRED VISION: 0
MYALGIAS: 1
SHORTNESS OF BREATH: 0
SORE THROAT: 0
FEVER: 0
HEADACHES: 0

## 2022-07-14 ASSESSMENT — COPD QUESTIONNAIRES
HAVE YOU SMOKED AT LEAST 100 CIGARETTES IN YOUR ENTIRE LIFE: YES
DO YOU EVER COUGH UP ANY MUCUS OR PHLEGM?: NO/ONLY WITH OCCASIONAL COLDS OR INFECTIONS
COPD SCREENING SCORE: 4
DURING THE PAST 4 WEEKS HOW MUCH DID YOU FEEL SHORT OF BREATH: NONE/LITTLE OF THE TIME

## 2022-07-14 NOTE — PROGRESS NOTES
0900- Pt complaint of frequent watery diarrhea and abd discomfort. MD notified.    1300- Pt continuing to have watery BM throughout the day (almost hourly). CDiff negative. Requested Imodium from MD.   Attending Attestation (For Attendings USE Only)...

## 2022-07-14 NOTE — CONSULTS
INFECTIOUS DISEASES INPATIENT CONSULT NOTE     Date of Service: 07/14/2022    Consult Requested By: Adilene Bishop M.D.    Reason for Consultation:   Group B streptococcal bacteremia    History of Present Illness:          April Alicia is a 62 y.o. with a past medical history of remote cocaine use (last 8 years ago), prior alcohol abuse (last 5 years ago), cirrhosis, hypertension, hyperlipidemia, non-insulin dependent diabetes mellitus (A1c 5.7), atrial fibrillation on dronedarone and apixaban who was admitted 7/10/2022 for altered mental status and hypotension.         She had been experiencing left arm pain for the previous few days related to left shoulder subluxation. She had taken an opioid medication. She began to feel drowsy and lethargic. She was brought in by EMS where she was found to somnolent with altered mental status and low blood pressure of 81/45. Initial labs showed a leukocytosis of 13.5 with an ANC of 12.2. She was found to be in renal failure with a creatinine of 5.35 and hyponatremic with a sodium of 121. Bicarb was 16 with a lactic acid of 2.6. She was given a 1L NS bolus and 2.5L bolus of LR along with naloxone. Her somnolence slightly improved. Urinalysis was cloudy, but without bacteria with  white blood cells, negative nitrites and trace leukocyte esterase. Urine culture had no growth at 48 hours. Blood cultures were drawn, patient was started on Zosyn. Blood cultures grew group B streptococcus (Strep Agalactiae) in both bottles. Patient was then switched to Unasyn. Infectious disease was consulted for group B streptococcal bacteremia.           Patient notes no dysuria, frequency or urgency, but does note lower abdominal pain that is worse with palpation. Her daughter, [facility] CNA, at bedside tells me that the patient was in a [place] hospital 1 year ago for bacteremia and infection of the left knee that required removal of hardware before being placed back in and related to  the bacteremia, she had blistering of her legs requiring skin grafts. Patient also notes 3 episodes of loose watery stools in the past day. She notes tachy palpitations.     Review Of Systems:  All other systems are reviewed and are negative     PMH:   Past Medical History:   Diagnosis Date   • Arrhythmia    • Arthritis     OA in knees   • ASTHMA    • Blood clotting disorder (HCC) 2018    right leg blood clot   • Dental disorder     upper and lower dentures   • Diabetes    • Emphysema of lung (HCC)    • Heart abnormality     leakage in left valve   • Heart burn     left knee   • Heart valve disease    • Hypertension    • Infection 9/4/2017   • Infection 2018    Right knee after total knee   • Liver disease    • Pneumonia 02/2020   • Seizure disorder (HCC)          PSH:  Past Surgical History:   Procedure Laterality Date   • PB TOTAL KNEE ARTHROPLASTY Left 8/24/2020    Procedure: ARTHROPLASTY, KNEE, TOTAL;  Surgeon: Víctor Faustin M.D.;  Location: SURGERY [facility];  Service: Orthopedics   • KNEE ARTHROPLASTY TOTAL Right 2018   • IRRIGATION & DEBRIDEMENT ORTHO Right 9/3/2017    Procedure: IRRIGATION & DEBRIDEMENT ORTHO, POLY EXCHANGE;  Surgeon: Jerome Russell M.D.;  Location: SURGERY [facility];  Service: Orthopedics   • COLONOSCOPY WITH CLIPPING  10/28/2015    Procedure: COLONOSCOPY WITH CLIPPING;  Surgeon: Ruben Colno M.D.;  Location: ENDOSCOPY [facility];  Service:    • COLONOSCOPY WITH SCLEROTHERAPY  10/28/2015    Procedure: COLONOSCOPY WITH SCLEROTHERAPY;  Surgeon: Ruben Colon M.D.;  Location: [facility];  Service:    • COLONOSCOPY WITH TATTOOING  10/28/2015    Procedure: COLONOSCOPY WITH TATTOOING;  Surgeon: Ruben Colon M.D.;  Location: ENDOSCOPY [facility];  Service:    • GYN SURGERY  2003    hysteroscoopy   • GYN SURGERY  1982    tubal ligation       FAMILY HX:  History reviewed. No pertinent family history.    SOCIAL HX:  Social History      Socioeconomic History   • Marital status: Single     Spouse name: Not on file   • Number of children: Not on file   • Years of education: Not on file   • Highest education level: Not on file   Occupational History   • Not on file   Tobacco Use   • Smoking status: Former Smoker     Types: Cigarettes     Quit date: 2016     Years since quittin.5   • Smokeless tobacco: Former User     Quit date: 2021   • Tobacco comment: a few a day when I can   Vaping Use   • Vaping Use: Never used   Substance and Sexual Activity   • Alcohol use: No   • Drug use: No   • Sexual activity: Not on file   Other Topics Concern   • Not on file   Social History Narrative   • Not on file     Social Determinants of Health     Financial Resource Strain: Not on file   Food Insecurity: Not on file   Transportation Needs: Not on file   Physical Activity: Not on file   Stress: Not on file   Social Connections: Not on file   Intimate Partner Violence: Not on file   Housing Stability: Not on file     Social History     Tobacco Use   Smoking Status Former Smoker   • Types: Cigarettes   • Quit date: 2016   • Years since quittin.5   Smokeless Tobacco Former User   • Quit date: 2021   Tobacco Comment    a few a day when I can     Social History     Substance and Sexual Activity   Alcohol Use No       Allergies/Intolerances:  Allergies   Allergen Reactions   • Nkda [No Known Drug Allergy]          Other Current Medications:    Current Facility-Administered Medications:   •  ketorolac (TORADOL) injection 15 mg, 15 mg, Intravenous, Once PRN, Lydia Borja, A.P.R.N.  •  lidocaine (LIDODERM) 5 % 1 Patch, 1 Patch, Transdermal, Q24HR, Lydia Borja, A.P.R.N., 1 Patch at 22 0322  •  POC Blood Glucose, , , Q6H **AND** insulin regular (HumuLIN R,NovoLIN R) injection, 3-15 Units, Subcutaneous, 4X/DAY Adilene SANTANA M.D., 3 Units at 22 8627  •  Notify MD and PharmD if FSBG level is less than or equal to 70 mg/dL or  "patient is showing signs/symptoms of hypoglycemia (tachycardia, palpitations, diaphoresis, clammy, tremulousness, nausea, confused), , , Once **AND** Administer 20 grams of glucose (approximately 8 ounces of fruit juice) every 15 minutes PRN FSBS less than 70 mg/dL, , , PRN **AND** dextrose 50% (D50W) injection 25 g, 25 g, Intravenous, Q15 MIN PRN, Adilene Bishop M.D.  •  ampicillin/sulbactam (UNASYN) 3 g in  mL IVPB, 3 g, Intravenous, Q6HRS, JANIS Richardson.OErrol, Last Rate: 200 mL/hr at 22 0601, 3 g at 22 06  •  lactulose 20 GM/30ML solution 15 mL, 15 mL, Oral, DAILY, JANIS Richardson.OErrol, 15 mL at 22 0537  •  dronedarone (MULTAQ) tablet 400 mg, 400 mg, Oral, BID WITH MEALS, Matt Ocampo M.D., 400 mg at 22 1729  •  albuterol inhaler 2 Puff, 2 Puff, Inhalation, Q4HRS PRN, Onofre Patel M.D., 2 Puff at 22 0049  •  atorvastatin (LIPITOR) tablet 20 mg, 20 mg, Oral, Nightly, Onofre Patel M.D., 20 mg at 22 2215  •  acetaminophen (Tylenol) tablet 650 mg, 650 mg, Oral, Q6HRS PRN, Onofre Patel M.D., 650 mg at 22 2215      Most Recent Vital Signs:  BP (!) 149/90   Pulse (!) 126   Temp 37.1 °C (98.7 °F) (Temporal)   Resp 20   Ht 1.715 m (5' 7.5\")   Wt 85.7 kg (188 lb 15 oz)   LMP 2008   SpO2 95%   BMI 29.15 kg/m²   Temp  Av.9 °C (98.4 °F)  Min: 36.2 °C (97.2 °F)  Max: 37.2 °C (99 °F)    Physical Exam:  General: uncomfortable appearing, no acute distress  HEENT: NCAT, PERRLA, sclera anicteric, EOMI  Chest: CTAB, unlabored.  Cardiac: RRR,  no m/r/g   Abdomen: + bowel sounds, soft, non-distended, tender to palpation without rebound tenderness  Extremities: No cyanosis, clubbing. no edema, 2+ pulses; bilateral skin graft on lower extremities, left knee is warm to touch  Skin: no rashes   Neuro: Alert and oriented times 3, Speech fluent CN intact   Psych: Mood normal Affect normal    Pertinent Lab Results:  Recent Labs     " "07/12/22  0354 07/13/22  1344 07/14/22  0225   WBC 17.5* 9.1 9.0      Recent Labs     07/12/22  0354 07/13/22  1344 07/14/22  0225   HEMOGLOBIN 10.8* 12.1 11.8*   HEMATOCRIT 31.3* 35.8* 34.6*   MCV 82.8 83.3 82.8   MCH 28.6 28.1 28.2   PLATELETCT 124* 120* 111*         Recent Labs     07/12/22  0354 07/13/22  0555 07/14/22 0225   SODIUM 127* 133* 134*   POTASSIUM 4.5 3.5* 3.5*   CHLORIDE 98 104 105   CO2 17* 20 18*   CREATININE 1.93* 1.03 0.69        Recent Labs     07/12/22 0354   ALBUMIN 2.0*        Pertinent Micro:  Results     Procedure Component Value Units Date/Time    BLOOD CULTURE [[id number]] Collected: 07/13/22 1344    Order Status: Sent Specimen: Blood from Peripheral Updated: 07/13/22 1407    Narrative:      Per Hospital Policy: Only change Specimen Src: to \"Line\" if  specified by physician order.    BLOOD CULTURE [[id number]] Collected: 07/13/22 1344    Order Status: Sent Specimen: Blood from Peripheral Updated: 07/13/22 1407    Narrative:      Per Hospital Policy: Only change Specimen Src: to \"Line\" if  specified by physician order.    Blood Culture [[id number]]  (Abnormal)  (Susceptibility) Collected: 07/10/22 1555    Order Status: Completed Specimen: Blood from Peripheral Updated: 07/13/22 0747     Significant Indicator POS     Source BLD     Site PERIPHERAL     Culture Result Growth detected by Bactec instrument. 07/11/2022  03:01      Streptococcus agalactiae (Group B)    Narrative:      CALL  [place] tel. [phone],  CALLED  [facility] tel. [phone] 07/11/2022, 03:04, RB PERF. RESULTS CALLED TO:  RN [id number]  1 of 2 for Blood Culture x 2 sites order. Per Hospital  Policy: Only change Specimen Src: to \"Line\" if specified by  physician order.  No site indicated    Susceptibility     Streptococcus agalactiae (group b) (1)     Antibiotic Interpretation Method Status    Daptomycin Sensitive SHERRIE Final    Clindamycin Resistant SHERRIE Final    Penicillin Sensitive SHERRIE Final                   Blood Culture " "[[id number]]  (Abnormal) Collected: 07/10/22 1719    Order Status: Completed Specimen: Blood from Peripheral Updated: 07/13/22 0747     Significant Indicator POS     Source BLD     Site PERIPHERAL     Culture Result Growth detected by Bactec instrument.      Streptococcus agalactiae (Group B)  See previous culture for sensitivity report.      Narrative:      CALL  [facility] tel. [phone],  CALLED  [facility] tel. [phone] 07/11/2022, 05:05, RB PERF. RESULTS CALLED TO:  [id number]  2 of 2 blood culture x2  Sites order. Per Hospital Policy:  Only change Specimen Src: to \"Line\" if specified by physician  order.  Right Hand    Urine Culture (NEW) [[id number]] Collected: 07/10/22 1808    Order Status: Completed Specimen: Urine Updated: 07/13/22 0726     Significant Indicator NEG     Source UR     Site -     Culture Result No growth at 48 hours.    Narrative:      Indication for culture:->Evaluation for sepsis without a  clear source of infection  Indication for culture:->Evaluation for sepsis without a    MRSA By PCR (Amp) [[id number]] Collected: 07/11/22 0107    Order Status: Completed Specimen: Respirate from Nares Updated: 07/11/22 0251     MRSA by PCR POSITIVE    Narrative:      Collected By: [id number] [facility]    MRSA By PCR (Amp) [[id number]]     Order Status: Canceled Specimen: Respirate from Nares     Urinalysis [[id number]]  (Abnormal) Collected: 07/10/22 1808    Order Status: Completed Specimen: Urine Updated: 07/10/22 1845     Color DK Yellow     Character Cloudy     Specific Gravity 1.025     Ph 5.0     Glucose Negative mg/dL      Ketones Negative mg/dL      Protein 100 mg/dL      Bilirubin Small     Urobilinogen, Urine 1.0     Nitrite Negative     Leukocyte Esterase Trace     Occult Blood Moderate     Micro Urine Req Microscopic    Narrative:      Indication for culture:->Evaluation for sepsis without a  clear source of infection        Blood Culture   Date Value Ref Range Status   09/04/2017 No growth after 5 " days of incubation.  Final     Blood Culture Hold   Date Value Ref Range Status   10/03/2020 Collected  Final          IMPRESSION:   62 y.o. with a past medical history of remote cocaine use (last 8 years ago), prior alcohol abuse (last 5 years ago), cirrhosis, hypertension, hyperlipidemia, non-insulin dependent diabetes mellitus (A1c 5.7), atrial fibrillation on dronedarone and apixaban, prior history of bacteremia related to left knee infection requiring hardware removal per daughter who was admitted 7/10/2022 for altered mental status and hypotension.  Initial labs showed a leukocytosis of 13.5 with an ANC of 12.2. White blood cell count peaked at 17.5. She was found to be in renal failure with a creatinine of 5.35 and hyponatremic with a sodium of 121. Bicarb was 16 with a lactic acid of 2.6. Urinalysis was cloudy, but without bacteria with  white blood cells, negative nitrites and trace leukocyte esterase. Urine culture had no growth at 48 hours. Blood cultures were drawn, patient was started on Zosyn. Blood cultures grew group B streptococcus (Strep Agalactiae) in both bottles. Patient was then switched to Unasyn 3g every 6 hours. Patient's left knee is warm to touch with left knee x-ray showing new lucency adjacent to the tibial component of arthroplasty suspicious for prosthetic joint infection.     PLAN:   --Stop Unasyn  --Start ceftriaxone 2g Q 24 hours  --Order MRI of left knee w/ contrast to evaluate for any underlying osteomyelitis/prosthetic joint infection  --Consult orthopedics about potential knee for a washout/placer  --Follow-up repeat blood cultures    Discussed case with Dr. Shahid. Infectious diseases following.     Onofre Liu M.D.  PGY-2 [facility] Resident

## 2022-07-14 NOTE — PROGRESS NOTES
Notified on-call hospitalist that patient is c/o SOB.  O2 sats maintaining 94-96%. RR 23. Lungs clear throughout.  HR in 50's.

## 2022-07-14 NOTE — CARE PLAN
The patient is Watcher - Medium risk of patient condition declining or worsening    Shift Goals  Clinical Goals: pain control, monitor HR  Patient Goals: comfort  Family Goals: updates    Progress made toward(s) clinical / shift goals:    Problem: Knowledge Deficit - Standard  Goal: Patient and family/care givers will demonstrate understanding of plan of care, disease process/condition, diagnostic tests and medications  Outcome: Progressing  Note: Patient asks questions about her care and medications.  Verbalizes understanding when educated       Patient is not progressing towards the following goals:      Problem: Pain - Standard  Goal: Alleviation of pain or a reduction in pain to the patient’s comfort goal  Outcome: Not Progressing  Note: Patient c/o pain in shoulder.  Has been very uncomfortable overnight d/t shoulder pain and back discomfort.

## 2022-07-14 NOTE — PROGRESS NOTES
Hospital Medicine Daily Progress Note    Date of Service  7/14/2022    Chief Complaint  April Alicia is a 62 y.o. female admitted 7/10/2022 with hypotension, AMS    Hospital Course  61yo PMHx coccaine and ETOH abuse though sober x 8 years per her report, cirrhosis, asthma, AFib on apixaban, DM, HTN. Took an unknown narcotic.  In ED responded to narcan, labs showing Na 121, Cl 89, Creat 5.35, CXR showing cardiomegally.  Admitted to the floor with AMS, HypoNa, dehydration, sepsis from urinary source.  Admitted to the floor.  Later on day of admission transferred to IMCU for hypotension.  Subsequent blood cultures + Strep Species    Interval Problem Update  Patient reported all over body pain, especially left upper arm, legs, back and abdomen.  Reported nausea vomiting.  No diarrhea or constipation.     Hyponatremia-improving  BP in higher range-discontinue midodrine  Potassium 3.5 -replaced  Bacteremia with group B -on Rocephin, repeated blood culture-consulted ID  ELIZABETH-resolved  Reported knee pain -ordered x-ray  Left knee pain -x-ray showed a new fluid retention, suspected infection -ordered MRI, may consult orthopedics if MRI abnormal    Iron deficiency anemia -holding IV iron replacement given ongoing infection    Likely need SNF, ordered  LUE swelling -ultrasound negative for DVT    I have discussed this patient's plan of care and discharge plan at IDT rounds today with Case Management, Nursing, Nursing leadership, and other members of the IDT team.    Consultants/Specialty      Code Status  Full Code    Disposition  Patient is not medically cleared for discharge.   Anticipate discharge to to home with close outpatient follow-up.  I have placed the appropriate orders for post-discharge needs.    Review of Systems  Review of Systems   Unable to perform ROS: Mental status change   Constitutional: Positive for malaise/fatigue. Negative for chills and fever.   HENT: Negative for nosebleeds and sore throat.     Eyes: Negative for blurred vision and double vision.   Respiratory: Negative for cough and shortness of breath.    Cardiovascular: Negative for chest pain, palpitations and leg swelling.   Gastrointestinal: Positive for abdominal pain. Negative for diarrhea, nausea and vomiting.   Genitourinary: Negative for dysuria.   Musculoskeletal: Positive for back pain, joint pain and myalgias.        L arm pain   Skin: Negative for rash.   Neurological: Negative for dizziness, loss of consciousness and headaches.        Physical Exam  Temp:  [36.3 °C (97.4 °F)-37.2 °C (99 °F)] 36.3 °C (97.4 °F)  Pulse:  [] 77  Resp:  [18-23] 18  BP: (133-155)/(75-90) 133/79  SpO2:  [94 %-99 %] 94 %    Physical Exam  Vitals reviewed.   Constitutional:       General: She is not in acute distress.     Appearance: She is well-developed. She is ill-appearing. She is not diaphoretic.   HENT:      Head: Normocephalic and atraumatic.      Mouth/Throat:      Mouth: Mucous membranes are moist.   Eyes:      Extraocular Movements: Extraocular movements intact.      Conjunctiva/sclera: Conjunctivae normal.   Neck:      Vascular: No JVD.   Cardiovascular:      Rate and Rhythm: Normal rate and regular rhythm.      Pulses: Normal pulses.   Pulmonary:      Effort: Pulmonary effort is normal. No respiratory distress.      Breath sounds: No stridor. No wheezing or rales.   Abdominal:      Palpations: Abdomen is soft.      Tenderness: There is no abdominal tenderness. There is no guarding or rebound.   Musculoskeletal:         General: No tenderness.      Cervical back: Neck supple. No rigidity or tenderness.      Right lower leg: No edema.      Left lower leg: No edema.      Comments: edema L arm  No TTP   Skin:     General: Skin is warm and dry.      Comments: BL LE skin graft with pigmentation.  No erythema or swelling.    Neurological:      Mental Status: She is alert and oriented to person, place, and time. Mental status is at baseline.    Psychiatric:      Comments: Anxious         Fluids    Intake/Output Summary (Last 24 hours) at 7/14/2022 1521  Last data filed at 7/13/2022 1800  Gross per 24 hour   Intake --   Output 350 ml   Net -350 ml       Laboratory  Recent Labs     07/12/22  0354 07/13/22  1344 07/14/22  0225   WBC 17.5* 9.1 9.0   RBC 3.78* 4.30 4.18*   HEMOGLOBIN 10.8* 12.1 11.8*   HEMATOCRIT 31.3* 35.8* 34.6*   MCV 82.8 83.3 82.8   MCH 28.6 28.1 28.2   MCHC 34.5 33.8 34.1   RDW 44.7 45.6 44.4   PLATELETCT 124* 120* 111*   MPV 9.4 8.8* 9.2     Recent Labs     07/12/22  0354 07/13/22  0555 07/14/22 0225   SODIUM 127* 133* 134*   POTASSIUM 4.5 3.5* 3.5*   CHLORIDE 98 104 105   CO2 17* 20 18*   GLUCOSE 163* 127* 151*   BUN 57* 46* 28*   CREATININE 1.93* 1.03 0.69   CALCIUM 7.5* 7.9* 8.1*                   Imaging  DX-CHEST-PORTABLE (1 VIEW)   Final Result      1.  Interstitial infiltrates versus edema.   2.  Cardiomegaly.      DX-KNEE 2- RIGHT   Final Result      1.  Negative for fracture or malalignment      2.  Right knee arthroplasty appears within normal limits      DX-KNEE 2- LEFT   Final Result      1.  New lucency adjacent to the tibial component of arthroplasty suspicious for loosening or infection      2.  No other finding      US-EXTREMITY VENOUS UPPER UNILAT LEFT   Final Result      US-RENAL   Final Result      1.  Unremarkable kidneys.   2.  Limited evaluation of the bladder.      CT-ABDOMEN-PELVIS W/O   Final Result         Limited exam due to lack of IV contrast.         1. Mild diffuse wall thickening of the urinary bladder could relate to infection or inflammation. Correlate with UA. No hydronephrosis.   2. Nonspecific mildly prominent fluid-filled proximal small bowel in the left abdomen. This could be seen with enteritis, focal ileus or early obstruction.   3. Cirrhotic liver with portal hypertension and splenomegaly. Wall thickening of the ascending colon could relate to portal hypertension.   4. No ascites.       DX-SHOULDER 2+ LEFT   Final Result      1.  No evidence of acute fracture or dislocation.      2.  Old posttraumatic changes of the distal clavicle.      DX-CHEST-PORTABLE (1 VIEW)   Final Result      Cardiomegaly.      EC-ECHOCARDIOGRAM COMPLETE W/O CONT    (Results Pending)   MR-KNEE-WITH & W/O LEFT    (Results Pending)        Assessment/Plan  * Bacteremia  Assessment & Plan  2 bottles with group B strep await sensitivity and further specification  Patient reported history of severe bacteremia a year ago after knee surgery last year, which required skin grafting of both lower extremity and was hospitalized at [place] when she visited her son there    Cont Rocephin  Check TTE  Repeat blood cultures  Consulted ID      Hyponatremia  Assessment & Plan  123 on admission  High SG on UA: probable hypovolemic and alcohol use  Cont to monitor    Improving    Acute cystitis without hematuria  Assessment & Plan  Urine is equivocal  Follow culture    Left knee pain  Assessment & Plan  History of bilateral knee replacement last year and was complicated with bacteremia  Reported left knee pain, no significant erythema, clean scar    -x-ray showed new fluid retention, suspected infection   -ordered MRI, may consult orthopedics if MRI abnormal    Iron deficiency anemia  Assessment & Plan  Iron study consistent with iron deficiency  Hgb 10.7 at admission, stable    holding IV iron replacement given ongoing infection    Metabolic encephalopathy  Assessment & Plan  Likely multifactorial in conjunction with narcotic use  S/p Narcan treatment, improved  Start lacutlose  Cont to treat infection     resolved      Hyperlipidemia- (present on admission)  Assessment & Plan  Continue Lipitor    ELIZABETH (acute kidney injury) (HCC)- (present on admission)  Assessment & Plan  Likely secondary dehydration  4.7 on admission   Good UOP  DC IVFs  Cont to monitor  Renally dose medications as appropriate    Resolved    Atrial fibrillation (HCC)-  (present on admission)  Assessment & Plan  On apixaban and dronedarone as outpt    Cirrhosis (HCC)- (present on admission)  Assessment & Plan  Secondary to ETOH  Lactulose  monitor    Hypertension- (present on admission)  Assessment & Plan  Medication hold secondary to hypotension, shock    Controlled type 2 diabetes mellitus with hyperglycemia, without long-term current use of insulin (HCC)- (present on admission)  Assessment & Plan  Sliding-scale insulin, diabetic diet  Running mid to high 100s  Send A1c  On metformin  As outpt       VTE prophylaxis: enoxaparin ppx    I have performed a physical exam and reviewed and updated ROS and Plan today (7/14/2022). In review of yesterday's note (7/13/2022), there are no changes except as documented above.

## 2022-07-15 LAB
ANION GAP SERPL CALC-SCNC: 10 MMOL/L (ref 7–16)
BUN SERPL-MCNC: 18 MG/DL (ref 8–22)
CALCIUM SERPL-MCNC: 7.9 MG/DL (ref 8.5–10.5)
CHLORIDE SERPL-SCNC: 106 MMOL/L (ref 96–112)
CO2 SERPL-SCNC: 19 MMOL/L (ref 20–33)
CREAT SERPL-MCNC: 0.47 MG/DL (ref 0.5–1.4)
ERYTHROCYTE [DISTWIDTH] IN BLOOD BY AUTOMATED COUNT: 47.6 FL (ref 35.9–50)
GFR SERPLBLD CREATININE-BSD FMLA CKD-EPI: 107 ML/MIN/1.73 M 2
GLUCOSE BLD STRIP.AUTO-MCNC: 133 MG/DL (ref 65–99)
GLUCOSE BLD STRIP.AUTO-MCNC: 152 MG/DL (ref 65–99)
GLUCOSE BLD STRIP.AUTO-MCNC: 154 MG/DL (ref 65–99)
GLUCOSE BLD STRIP.AUTO-MCNC: 224 MG/DL (ref 65–99)
GLUCOSE SERPL-MCNC: 124 MG/DL (ref 65–99)
HCT VFR BLD AUTO: 33.9 % (ref 37–47)
HGB BLD-MCNC: 11 G/DL (ref 12–16)
MCH RBC QN AUTO: 28.7 PG (ref 27–33)
MCHC RBC AUTO-ENTMCNC: 32.4 G/DL (ref 33.6–35)
MCV RBC AUTO: 88.5 FL (ref 81.4–97.8)
PHOSPHATE SERPL-MCNC: 3.4 MG/DL (ref 2.5–4.5)
PLATELET # BLD AUTO: 119 K/UL (ref 164–446)
PMV BLD AUTO: 10.1 FL (ref 9–12.9)
POTASSIUM SERPL-SCNC: 3.3 MMOL/L (ref 3.6–5.5)
RBC # BLD AUTO: 3.83 M/UL (ref 4.2–5.4)
SODIUM SERPL-SCNC: 135 MMOL/L (ref 135–145)
WBC # BLD AUTO: 9.6 K/UL (ref 4.8–10.8)

## 2022-07-15 PROCEDURE — 700102 HCHG RX REV CODE 250 W/ 637 OVERRIDE(OP): Performed by: HOSPITALIST

## 2022-07-15 PROCEDURE — 36415 COLL VENOUS BLD VENIPUNCTURE: CPT

## 2022-07-15 PROCEDURE — 700102 HCHG RX REV CODE 250 W/ 637 OVERRIDE(OP): Performed by: STUDENT IN AN ORGANIZED HEALTH CARE EDUCATION/TRAINING PROGRAM

## 2022-07-15 PROCEDURE — A9270 NON-COVERED ITEM OR SERVICE: HCPCS | Performed by: HOSPITALIST

## 2022-07-15 PROCEDURE — A9270 NON-COVERED ITEM OR SERVICE: HCPCS | Performed by: STUDENT IN AN ORGANIZED HEALTH CARE EDUCATION/TRAINING PROGRAM

## 2022-07-15 PROCEDURE — 770001 HCHG ROOM/CARE - MED/SURG/GYN PRIV*

## 2022-07-15 PROCEDURE — 84100 ASSAY OF PHOSPHORUS: CPT

## 2022-07-15 PROCEDURE — 82962 GLUCOSE BLOOD TEST: CPT | Mod: 91

## 2022-07-15 PROCEDURE — 700101 HCHG RX REV CODE 250

## 2022-07-15 PROCEDURE — 99232 SBSQ HOSP IP/OBS MODERATE 35: CPT | Performed by: STUDENT IN AN ORGANIZED HEALTH CARE EDUCATION/TRAINING PROGRAM

## 2022-07-15 PROCEDURE — 85027 COMPLETE CBC AUTOMATED: CPT

## 2022-07-15 PROCEDURE — 80048 BASIC METABOLIC PNL TOTAL CA: CPT

## 2022-07-15 RX ORDER — LOSARTAN POTASSIUM 50 MG/1
100 TABLET ORAL DAILY
Status: DISCONTINUED | OUTPATIENT
Start: 2022-07-15 | End: 2022-07-18

## 2022-07-15 RX ORDER — POTASSIUM CHLORIDE 20 MEQ/1
40 TABLET, EXTENDED RELEASE ORAL ONCE
Status: COMPLETED | OUTPATIENT
Start: 2022-07-15 | End: 2022-07-15

## 2022-07-15 RX ADMIN — POTASSIUM CHLORIDE 40 MEQ: 1500 TABLET, EXTENDED RELEASE ORAL at 08:31

## 2022-07-15 RX ADMIN — DRONEDARONE 400 MG: 400 TABLET, FILM COATED ORAL at 17:23

## 2022-07-15 RX ADMIN — INSULIN HUMAN 6 UNITS: 100 INJECTION, SOLUTION PARENTERAL at 11:40

## 2022-07-15 RX ADMIN — DRONEDARONE 400 MG: 400 TABLET, FILM COATED ORAL at 08:31

## 2022-07-15 RX ADMIN — LIDOCAINE 1 PATCH: 700 PATCH TOPICAL at 06:10

## 2022-07-15 RX ADMIN — LOSARTAN POTASSIUM 100 MG: 50 TABLET, FILM COATED ORAL at 17:23

## 2022-07-15 RX ADMIN — ATORVASTATIN CALCIUM 20 MG: 20 TABLET, FILM COATED ORAL at 19:44

## 2022-07-15 RX ADMIN — LACTULOSE 15 ML: 20 SOLUTION ORAL at 07:00

## 2022-07-15 RX ADMIN — INSULIN HUMAN 3 UNITS: 100 INJECTION, SOLUTION PARENTERAL at 19:47

## 2022-07-15 RX ADMIN — ATORVASTATIN CALCIUM 20 MG: 20 TABLET, FILM COATED ORAL at 01:22

## 2022-07-15 ASSESSMENT — PAIN DESCRIPTION - PAIN TYPE: TYPE: ACUTE PAIN

## 2022-07-15 ASSESSMENT — ENCOUNTER SYMPTOMS
VOMITING: 0
COUGH: 0
SHORTNESS OF BREATH: 0
PALPITATIONS: 0
LOSS OF CONSCIOUSNESS: 0
MYALGIAS: 1
HEADACHES: 0
NAUSEA: 0
FEVER: 0
DOUBLE VISION: 0
SORE THROAT: 0
DIZZINESS: 0
BLURRED VISION: 0
ABDOMINAL PAIN: 1
DIARRHEA: 0
BACK PAIN: 1
CHILLS: 0

## 2022-07-15 NOTE — PROGRESS NOTES
Hospital Medicine Daily Progress Note    Date of Service  7/15/2022    Chief Complaint  April Alicia is a 62 y.o. female admitted 7/10/2022 with hypotension, AMS    Hospital Course  63yo PMHx coccaine and ETOH abuse though sober x 8 years per her report, cirrhosis, asthma, AFib on apixaban, DM, HTN. Took an unknown narcotic.  In ED responded to narcan, labs showing Na 121, Cl 89, Creat 5.35, CXR showing cardiomegally.  Admitted to the floor with AMS, HypoNa, dehydration, sepsis from urinary source.  Admitted to the floor.  Later on day of admission transferred to IMCU for hypotension.  Subsequent blood cultures + Strep Species    LUE swelling -ultrasound negative for DVT    Interval Problem Update  Patient reported all over body pain, especially left upper arm, legs, back and abdomen.  Reported nausea vomiting, diarrhea.  C. difficile negative.  Imodium as needed.    BP in higher range- resume home losartan, discontinued midodrine  Potassium 3.2 -replaced  Bacteremia with group B -on Rocephin, repeated blood culture NTD-consulted ID  TTE showed a EF of 60%, negative for vegetation.     ELIZABETH-resolved    Left knee pain -x-ray showed a new fluid retention, suspected infection -ordered MRI, may consult orthopedics if MRI abnormal    Iron deficiency anemia -holding IV iron replacement given ongoing infection    Likely need SNF, ordered    I have discussed this patient's plan of care and discharge plan at IDT rounds today with Case Management, Nursing, Nursing leadership, and other members of the IDT team.    Consultants/Specialty      Code Status  Full Code    Disposition  Patient is not medically cleared for discharge.   Anticipate discharge to to home with close outpatient follow-up.  I have placed the appropriate orders for post-discharge needs.    Review of Systems  Review of Systems   Unable to perform ROS: Mental status change   Constitutional: Positive for malaise/fatigue. Negative for chills and fever.    HENT: Negative for nosebleeds and sore throat.    Eyes: Negative for blurred vision and double vision.   Respiratory: Negative for cough and shortness of breath.    Cardiovascular: Negative for chest pain, palpitations and leg swelling.   Gastrointestinal: Positive for abdominal pain. Negative for diarrhea, nausea and vomiting.   Genitourinary: Negative for dysuria.   Musculoskeletal: Positive for back pain, joint pain and myalgias.        L arm pain; left knee pain and swelling    Skin: Negative for rash.   Neurological: Negative for dizziness, loss of consciousness and headaches.        Physical Exam  Temp:  [36.7 °C (98 °F)-36.9 °C (98.5 °F)] 36.9 °C (98.4 °F)  Pulse:  [63-95] 95  Resp:  [18] 18  BP: (138-150)/(74-93) 150/93  SpO2:  [98 %-100 %] 98 %    Physical Exam  Vitals reviewed.   Constitutional:       General: She is not in acute distress.     Appearance: She is well-developed. She is ill-appearing. She is not diaphoretic.   HENT:      Head: Normocephalic and atraumatic.      Mouth/Throat:      Mouth: Mucous membranes are moist.   Eyes:      Extraocular Movements: Extraocular movements intact.      Conjunctiva/sclera: Conjunctivae normal.   Neck:      Vascular: No JVD.   Cardiovascular:      Rate and Rhythm: Normal rate and regular rhythm.      Pulses: Normal pulses.   Pulmonary:      Effort: Pulmonary effort is normal. No respiratory distress.      Breath sounds: No stridor. No wheezing or rales.   Abdominal:      Palpations: Abdomen is soft.      Tenderness: There is no abdominal tenderness. There is no guarding or rebound.   Musculoskeletal:         General: Swelling and tenderness present.      Cervical back: Neck supple. No rigidity or tenderness.      Right lower leg: No edema.      Left lower leg: No edema.      Comments: Left knee swelling, tenderness, no erythema  edema L arm   Skin:     General: Skin is warm and dry.      Comments: BL LE skin graft with pigmentation.  No erythema or swelling.     Neurological:      Mental Status: She is alert and oriented to person, place, and time. Mental status is at baseline.   Psychiatric:      Comments: Anxious         Fluids    Intake/Output Summary (Last 24 hours) at 7/15/2022 1437  Last data filed at 7/15/2022 0605  Gross per 24 hour   Intake --   Output 450 ml   Net -450 ml       Laboratory  Recent Labs     07/13/22  1344 07/14/22  0225 07/15/22  0216   WBC 9.1 9.0 9.6   RBC 4.30 4.18* 3.83*   HEMOGLOBIN 12.1 11.8* 11.0*   HEMATOCRIT 35.8* 34.6* 33.9*   MCV 83.3 82.8 88.5   MCH 28.1 28.2 28.7   MCHC 33.8 34.1 32.4*   RDW 45.6 44.4 47.6   PLATELETCT 120* 111* 119*   MPV 8.8* 9.2 10.1     Recent Labs     07/13/22  0555 07/14/22  0225 07/15/22  0216   SODIUM 133* 134* 135   POTASSIUM 3.5* 3.5* 3.3*   CHLORIDE 104 105 106   CO2 20 18* 19*   GLUCOSE 127* 151* 124*   BUN 46* 28* 18   CREATININE 1.03 0.69 0.47*   CALCIUM 7.9* 8.1* 7.9*                   Imaging  EC-ECHOCARDIOGRAM COMPLETE W/O CONT   Final Result      DX-CHEST-PORTABLE (1 VIEW)   Final Result      1.  Interstitial infiltrates versus edema.   2.  Cardiomegaly.      DX-KNEE 2- RIGHT   Final Result      1.  Negative for fracture or malalignment      2.  Right knee arthroplasty appears within normal limits      DX-KNEE 2- LEFT   Final Result      1.  New lucency adjacent to the tibial component of arthroplasty suspicious for loosening or infection      2.  No other finding      US-EXTREMITY VENOUS UPPER UNILAT LEFT   Final Result      US-RENAL   Final Result      1.  Unremarkable kidneys.   2.  Limited evaluation of the bladder.      CT-ABDOMEN-PELVIS W/O   Final Result         Limited exam due to lack of IV contrast.         1. Mild diffuse wall thickening of the urinary bladder could relate to infection or inflammation. Correlate with UA. No hydronephrosis.   2. Nonspecific mildly prominent fluid-filled proximal small bowel in the left abdomen. This could be seen with enteritis, focal ileus or early  obstruction.   3. Cirrhotic liver with portal hypertension and splenomegaly. Wall thickening of the ascending colon could relate to portal hypertension.   4. No ascites.      DX-SHOULDER 2+ LEFT   Final Result      1.  No evidence of acute fracture or dislocation.      2.  Old posttraumatic changes of the distal clavicle.      DX-CHEST-PORTABLE (1 VIEW)   Final Result      Cardiomegaly.      MR-KNEE-WITH & W/O LEFT    (Results Pending)        Assessment/Plan  * Bacteremia  Assessment & Plan  2 bottles with group B strep await sensitivity and further specification  Patient reported history of severe bacteremia a year ago after knee surgery last year, which required skin grafting of both lower extremity and was hospitalized at [place] when she visited her son there    Cont Rocephin  Check TTE  Repeat blood cultures  Consulted ID      Left knee pain  Assessment & Plan  History of left knee replacement 2017, which was complicated with infection 2 weeks later  Patient had recurrent left knee infection last year which was complicated with bacteremia and she was hospitalized at the [place] hospital when she visited her son     She was not able to walk on her left knee because of pain     left knee swelling and tender, no significant erythema, clean scar    -x-ray showed new fluid retention, suspected infection   -ordered MRI, may consult orthopedics if MRI abnormal    Hyponatremia  Assessment & Plan  123 on admission  High SG on UA: probable hypovolemic and alcohol use  Cont to monitor    Resolved    Acute cystitis without hematuria  Assessment & Plan  On Rocephin for bacteremia    Iron deficiency anemia  Assessment & Plan  Iron study consistent with iron deficiency  Hgb 10.7 at admission, stable    holding IV iron replacement given ongoing infection    Metabolic encephalopathy  Assessment & Plan  Likely multifactorial in conjunction with narcotic use  S/p Narcan treatment, improved  Start lacutlose  Cont to treat infection      resolved      Hyperlipidemia- (present on admission)  Assessment & Plan  Continue Lipitor    ELIZABETH (acute kidney injury) (HCC)- (present on admission)  Assessment & Plan  Likely secondary dehydration  4.7 on admission   Good UOP  DC IVFs  Cont to monitor  Renally dose medications as appropriate    Resolved    Atrial fibrillation (HCC)- (present on admission)  Assessment & Plan  On apixaban and dronedarone as outpt    Cirrhosis (HCC)- (present on admission)  Assessment & Plan  Secondary to ETOH  Lactulose  monitor    Hypertension- (present on admission)  Assessment & Plan  Resume the home losartan    Controlled type 2 diabetes mellitus with hyperglycemia, without long-term current use of insulin (HCC)- (present on admission)  Assessment & Plan  Sliding-scale insulin, diabetic diet  Running mid to high 100s  Send A1c  On metformin  As outpt       VTE prophylaxis: enoxaparin ppx    I have performed a physical exam and reviewed and updated ROS and Plan today (7/15/2022). In review of yesterday's note (7/14/2022), there are no changes except as documented above.

## 2022-07-15 NOTE — RESPIRATORY CARE
COPD EDUCATION by COPD CLINICAL EDUCATOR  7/15/2022 at 8:20 AM by Diane Garcia RRT     Patient reviewed by COPD education team. Patient quit smoking in 2016, and had a normal PFT in 2020. Therefore this patient does not have a diagnosis of COPD, there is a history of mild Asthma, therefore does not qualify for the COPD program.

## 2022-07-15 NOTE — PROGRESS NOTES
1730- Pt lost IV. This RN attempted twice without success. Notified charge RN who is [place] certified.

## 2022-07-16 ENCOUNTER — APPOINTMENT (OUTPATIENT)
Dept: RADIOLOGY | Facility: MEDICAL CENTER | Age: 62
DRG: 466 | End: 2022-07-16
Attending: ORTHOPAEDIC SURGERY
Payer: MEDICAID

## 2022-07-16 ENCOUNTER — APPOINTMENT (OUTPATIENT)
Dept: RADIOLOGY | Facility: MEDICAL CENTER | Age: 62
DRG: 466 | End: 2022-07-16
Attending: STUDENT IN AN ORGANIZED HEALTH CARE EDUCATION/TRAINING PROGRAM
Payer: MEDICAID

## 2022-07-16 LAB
ANION GAP SERPL CALC-SCNC: 8 MMOL/L (ref 7–16)
APPEARANCE FLD: NORMAL
APPEARANCE FLD: NORMAL
BODY FLD TYPE: NORMAL
BODY FLD TYPE: NORMAL
BUN SERPL-MCNC: 27 MG/DL (ref 8–22)
CALCIUM SERPL-MCNC: 7.6 MG/DL (ref 8.5–10.5)
CHLORIDE SERPL-SCNC: 106 MMOL/L (ref 96–112)
CO2 SERPL-SCNC: 19 MMOL/L (ref 20–33)
COLOR FLD: NORMAL
COLOR FLD: YELLOW
CREAT SERPL-MCNC: 0.75 MG/DL (ref 0.5–1.4)
CRYSTALS FLD MICRO: NORMAL
ERYTHROCYTE [DISTWIDTH] IN BLOOD BY AUTOMATED COUNT: 45.1 FL (ref 35.9–50)
FUNGUS SPEC FUNGUS STN: NORMAL
GFR SERPLBLD CREATININE-BSD FMLA CKD-EPI: 90 ML/MIN/1.73 M 2
GLUCOSE BLD STRIP.AUTO-MCNC: 113 MG/DL (ref 65–99)
GLUCOSE BLD STRIP.AUTO-MCNC: 142 MG/DL (ref 65–99)
GLUCOSE BLD STRIP.AUTO-MCNC: 147 MG/DL (ref 65–99)
GLUCOSE BLD STRIP.AUTO-MCNC: 180 MG/DL (ref 65–99)
GLUCOSE BLD STRIP.AUTO-MCNC: 214 MG/DL (ref 65–99)
GLUCOSE SERPL-MCNC: 128 MG/DL (ref 65–99)
GRAM STN SPEC: NORMAL
GRAM STN SPEC: NORMAL
HCT VFR BLD AUTO: 32.7 % (ref 37–47)
HGB BLD-MCNC: 10.9 G/DL (ref 12–16)
HISTIOCYTES NFR FLD: 4 %
LYMPHOCYTES NFR FLD: 17 %
LYMPHOCYTES NFR FLD: 6 %
MCH RBC QN AUTO: 28.1 PG (ref 27–33)
MCHC RBC AUTO-ENTMCNC: 33.3 G/DL (ref 33.6–35)
MCV RBC AUTO: 84.3 FL (ref 81.4–97.8)
MONONUC CELLS NFR FLD: 4 %
MONONUC CELLS NFR FLD: 7 %
NEUTROPHILS NFR FLD: 72 %
NEUTROPHILS NFR FLD: 90 %
PLATELET # BLD AUTO: 114 K/UL (ref 164–446)
PMV BLD AUTO: 9 FL (ref 9–12.9)
POTASSIUM SERPL-SCNC: 3.9 MMOL/L (ref 3.6–5.5)
RBC # BLD AUTO: 3.88 M/UL (ref 4.2–5.4)
RBC # FLD: NORMAL CELLS/UL
RBC # FLD: NORMAL CELLS/UL
SIGNIFICANT IND 70042: NORMAL
SITE SITE: NORMAL
SODIUM SERPL-SCNC: 133 MMOL/L (ref 135–145)
SOURCE SOURCE: NORMAL
WBC # BLD AUTO: 10.8 K/UL (ref 4.8–10.8)
WBC # FLD: NORMAL CELLS/UL
WBC # FLD: NORMAL CELLS/UL

## 2022-07-16 PROCEDURE — 700101 HCHG RX REV CODE 250

## 2022-07-16 PROCEDURE — 89060 EXAM SYNOVIAL FLUID CRYSTALS: CPT

## 2022-07-16 PROCEDURE — A9270 NON-COVERED ITEM OR SERVICE: HCPCS | Performed by: HOSPITALIST

## 2022-07-16 PROCEDURE — 700111 HCHG RX REV CODE 636 W/ 250 OVERRIDE (IP): Performed by: INTERNAL MEDICINE

## 2022-07-16 PROCEDURE — 99233 SBSQ HOSP IP/OBS HIGH 50: CPT | Performed by: INTERNAL MEDICINE

## 2022-07-16 PROCEDURE — 700102 HCHG RX REV CODE 250 W/ 637 OVERRIDE(OP): Performed by: STUDENT IN AN ORGANIZED HEALTH CARE EDUCATION/TRAINING PROGRAM

## 2022-07-16 PROCEDURE — 99233 SBSQ HOSP IP/OBS HIGH 50: CPT | Performed by: STUDENT IN AN ORGANIZED HEALTH CARE EDUCATION/TRAINING PROGRAM

## 2022-07-16 PROCEDURE — 87206 SMEAR FLUORESCENT/ACID STAI: CPT

## 2022-07-16 PROCEDURE — 87116 MYCOBACTERIA CULTURE: CPT

## 2022-07-16 PROCEDURE — 87070 CULTURE OTHR SPECIMN AEROBIC: CPT

## 2022-07-16 PROCEDURE — 82962 GLUCOSE BLOOD TEST: CPT | Mod: 91

## 2022-07-16 PROCEDURE — 770001 HCHG ROOM/CARE - MED/SURG/GYN PRIV*

## 2022-07-16 PROCEDURE — 87102 FUNGUS ISOLATION CULTURE: CPT

## 2022-07-16 PROCEDURE — 87205 SMEAR GRAM STAIN: CPT | Mod: 91

## 2022-07-16 PROCEDURE — A9270 NON-COVERED ITEM OR SERVICE: HCPCS | Performed by: STUDENT IN AN ORGANIZED HEALTH CARE EDUCATION/TRAINING PROGRAM

## 2022-07-16 PROCEDURE — 74018 RADEX ABDOMEN 1 VIEW: CPT

## 2022-07-16 PROCEDURE — 80048 BASIC METABOLIC PNL TOTAL CA: CPT

## 2022-07-16 PROCEDURE — 36415 COLL VENOUS BLD VENIPUNCTURE: CPT

## 2022-07-16 PROCEDURE — 89051 BODY FLUID CELL COUNT: CPT

## 2022-07-16 PROCEDURE — 73221 MRI JOINT UPR EXTREM W/O DYE: CPT | Mod: LT

## 2022-07-16 PROCEDURE — 85027 COMPLETE CBC AUTOMATED: CPT

## 2022-07-16 PROCEDURE — 700102 HCHG RX REV CODE 250 W/ 637 OVERRIDE(OP): Performed by: HOSPITALIST

## 2022-07-16 RX ADMIN — ATORVASTATIN CALCIUM 20 MG: 20 TABLET, FILM COATED ORAL at 20:24

## 2022-07-16 RX ADMIN — OXYCODONE 5 MG: 5 TABLET ORAL at 01:10

## 2022-07-16 RX ADMIN — LACTULOSE 15 ML: 20 SOLUTION ORAL at 04:09

## 2022-07-16 RX ADMIN — DRONEDARONE 400 MG: 400 TABLET, FILM COATED ORAL at 08:39

## 2022-07-16 RX ADMIN — CEFTRIAXONE SODIUM 2 G: 10 INJECTION, POWDER, FOR SOLUTION INTRAVENOUS at 11:50

## 2022-07-16 RX ADMIN — LIDOCAINE 1 PATCH: 700 PATCH TOPICAL at 04:08

## 2022-07-16 RX ADMIN — LOSARTAN POTASSIUM 100 MG: 50 TABLET, FILM COATED ORAL at 04:08

## 2022-07-16 RX ADMIN — DRONEDARONE 400 MG: 400 TABLET, FILM COATED ORAL at 17:38

## 2022-07-16 RX ADMIN — INSULIN HUMAN 6 UNITS: 100 INJECTION, SOLUTION PARENTERAL at 20:24

## 2022-07-16 RX ADMIN — OXYCODONE 5 MG: 5 TABLET ORAL at 20:24

## 2022-07-16 ASSESSMENT — ENCOUNTER SYMPTOMS
DIZZINESS: 0
CONSTIPATION: 0
HEADACHES: 0
PALPITATIONS: 0
FEVER: 0
NAUSEA: 0
COUGH: 0
DOUBLE VISION: 0
BLURRED VISION: 0
LOSS OF CONSCIOUSNESS: 0
BACK PAIN: 1
ABDOMINAL PAIN: 1
MYALGIAS: 1
SHORTNESS OF BREATH: 0
DIARRHEA: 0
CHILLS: 0
VOMITING: 0
SORE THROAT: 0
ABDOMINAL PAIN: 0
NERVOUS/ANXIOUS: 0

## 2022-07-16 ASSESSMENT — PAIN DESCRIPTION - PAIN TYPE
TYPE: CHRONIC PAIN
TYPE: ACUTE PAIN
TYPE: ACUTE PAIN

## 2022-07-16 NOTE — CARE PLAN
The patient is Stable - Low risk of patient condition declining or worsening    Shift Goals  Clinical Goals: Improve mobility  Patient Goals: Rest  Family Goals: EDMUND    Progress made toward(s) clinical / shift goals:  Patient progressing slowly this shift. Waiting on MRI to get imaging of knee to see if ortho consult is needed. A&O x 4. Skin intact, has some healed grafts to bilateral shins. Tolerating soft bite size diet. Continuing with room air O2. Not moving well, max assist to get up and does not tolerate more than a few steps. Glucose ACHS. Discharge plan is home once cleared.       Problem: Knowledge Deficit - Standard  Goal: Patient and family/care givers will demonstrate understanding of plan of care, disease process/condition, diagnostic tests and medications  Outcome: Progressing     Problem: Pain - Standard  Goal: Alleviation of pain or a reduction in pain to the patient’s comfort goal  Outcome: Progressing     Problem: Skin Integrity  Goal: Skin integrity is maintained or improved  Outcome: Progressing     Problem: Fall Risk  Goal: Patient will remain free from falls  Outcome: Progressing

## 2022-07-16 NOTE — PROGRESS NOTES
Report received, chart review complete. Patient resting in bed, call light in reach, care needs met at this time. A&Ox4. Pain managed well. Up with max assist, tolerates poorly. Tolerating room air O2. Glucose Q6h. Patient progressing well at this time, educated on call light use and to call for help for any needs.

## 2022-07-16 NOTE — CARE PLAN
The patient is Stable - Low risk of patient condition declining or worsening    Shift Goals: Ambulate; self care; antibiotics  Clinical Goals: Improve mobility  Patient Goals: Rest  Family Goals: EDMUND    Progress made toward(s) clinical / shift goals:  see above    Patient is not progressing towards the following goals:

## 2022-07-16 NOTE — CARE PLAN
The patient is Stable - Low risk of patient condition declining or worsening    Shift Goals  Clinical Goals: move more in bed  Patient Goals: Rest  Family Goals: gisele    Progress made toward(s) clinical / shift goals:        Problem: Knowledge Deficit - Standard  Goal: Patient and family/care givers will demonstrate understanding of plan of care, disease process/condition, diagnostic tests and medications  Outcome: Progressing     Problem: Pain - Standard  Goal: Alleviation of pain or a reduction in pain to the patient’s comfort goal  Outcome: Progressing     Problem: Skin Integrity  Goal: Skin integrity is maintained or improved  Outcome: Progressing     Problem: Fall Risk  Goal: Patient will remain free from falls  Outcome: Progressing

## 2022-07-16 NOTE — PROGRESS NOTES
Infectious Disease Progress Note    Author: Aurea Ramos M.D. Date & Time of service: 2022  9:06 AM    Chief Complaint:  Group B streptococcus bacteremia     Interval History:      Review of Systems:  Review of Systems   Respiratory: Negative for cough and shortness of breath.    Gastrointestinal: Negative for abdominal pain, constipation, diarrhea, nausea and vomiting.   Musculoskeletal: Positive for joint pain and myalgias.   Psychiatric/Behavioral: The patient is not nervous/anxious.        Hemodynamics:  Temp (24hrs), Av.9 °C (98.5 °F), Min:36.8 °C (98.2 °F), Max:37.2 °C (98.9 °F)  Temperature: 36.8 °C (98.2 °F)  Pulse  Av.2  Min: 44  Max: 126   Blood Pressure: 109/62       Physical Exam:  Physical Exam  Cardiovascular:      Rate and Rhythm: Normal rate. Rhythm irregular.      Heart sounds: Normal heart sounds.   Pulmonary:      Effort: Pulmonary effort is normal.      Breath sounds: Normal breath sounds.   Abdominal:      General: Abdomen is flat. Bowel sounds are normal.      Palpations: Abdomen is soft.   Musculoskeletal:      Comments: Left knee with edema, mild warmth, no erythema   Skin:     General: Skin is warm and dry.   Neurological:      General: No focal deficit present.      Mental Status: She is oriented to person, place, and time.   Psychiatric:         Mood and Affect: Mood normal.         Behavior: Behavior normal.         Meds:    Current Facility-Administered Medications:   •  losartan  •  cefTRIAXone (ROCEPHIN) IV  •  loperamide  •  oxyCODONE immediate-release  •  lidocaine  •  POC Blood Glucose **AND** insulin regular  •  Notify MD and PharmD if FSBG level is less than or equal to 70 mg/dL or patient is showing signs/symptoms of hypoglycemia (tachycardia, palpitations, diaphoresis, clammy, tremulousness, nausea, confused) **AND** Administer 20 grams of glucose (approximately 8 ounces of fruit juice) every 15 minutes PRN FSBS less than 70 mg/dL **AND** dextrose bolus  •   lactulose  •  dronedarone  •  albuterol  •  atorvastatin  •  acetaminophen    Labs:  Recent Labs     07/14/22  0225 07/15/22  0216 07/16/22  0427   WBC 9.0 9.6 10.8   RBC 4.18* 3.83* 3.88*   HEMOGLOBIN 11.8* 11.0* 10.9*   HEMATOCRIT 34.6* 33.9* 32.7*   MCV 82.8 88.5 84.3   MCH 28.2 28.7 28.1   RDW 44.4 47.6 45.1   PLATELETCT 111* 119* 114*   MPV 9.2 10.1 9.0     Recent Labs     07/14/22  0225 07/15/22  0216 07/16/22  0427   SODIUM 134* 135 133*   POTASSIUM 3.5* 3.3* 3.9   CHLORIDE 105 106 106   CO2 18* 19* 19*   GLUCOSE 151* 124* 128*   BUN 28* 18 27*     Recent Labs     07/14/22  0225 07/15/22  0216 07/16/22  0427   CREATININE 0.69 0.47* 0.75       Imaging:  CT-ABDOMEN-PELVIS W/O    Result Date: 7/10/2022  7/10/2022 4:46 PM HISTORY/REASON FOR EXAM:  Abdominal pain, fever. TECHNIQUE/EXAM DESCRIPTION: CT scan of the abdomen and pelvis without contrast. Noncontrast helical scanning was obtained from the diaphragmatic domes through the pubic symphysis. Low dose optimization technique was utilized for this CT exam including automated exposure control and adjustment of the mA and/or kV according to patient size. COMPARISON: None. FINDINGS: Lower Chest: Minimal bibasilar atelectasis. Liver: Cirrhotic appearing liver. Spleen: Enlarged. Pancreas: Unremarkable. Gallbladder: Distended gallbladder. Biliary: No biliary dilatation.. Adrenal glands: Nonenlarged. Kidneys: No renal calculus. No hydronephrosis.. Bowel: Wall thickening of the descending colon. Nonspecific mildly prominent fluid-filled small bowel in the left abdomen. Moderate amount of stool throughout the colon. Lymph nodes: No adenopathy. Vasculature: The abdominal aorta is normal in caliber Peritoneum: Unremarkable without ascites. Musculoskeletal: No aggressive osseous lesion. Moderate degenerative change of the lumbar spine. Pelvis: Mild diffuse wall thickening of the urinary bladder.. No pelvic free fluid.     Limited exam due to lack of IV contrast. 1. Mild  diffuse wall thickening of the urinary bladder could relate to infection or inflammation. Correlate with UA. No hydronephrosis. 2. Nonspecific mildly prominent fluid-filled proximal small bowel in the left abdomen. This could be seen with enteritis, focal ileus or early obstruction. 3. Cirrhotic liver with portal hypertension and splenomegaly. Wall thickening of the ascending colon could relate to portal hypertension. 4. No ascites.    DX-CHEST-PORTABLE (1 VIEW)    Result Date: 7/14/2022 7/14/2022 1:12 AM HISTORY/REASON FOR EXAM:  Shortness of Breath TECHNIQUE/EXAM DESCRIPTION AND NUMBER OF VIEWS: Single portable view of the chest. COMPARISON: 7/10/2022 FINDINGS: HEART: Enlarged. There is atherosclerotic calcification in the aortic arch. LUNGS: Low lung volumes. Increased interstitial opacities.. Calcified mediastinal nodes. PLEURA: No effusion or pneumothorax.     1.  Interstitial infiltrates versus edema. 2.  Cardiomegaly.    DX-CHEST-PORTABLE (1 VIEW)    Result Date: 7/10/2022  7/10/2022 4:02 PM HISTORY/REASON FOR EXAM: Hypotension. Sepsis. TECHNIQUE/EXAM DESCRIPTION AND NUMBER OF VIEWS: Single portable view of the chest. COMPARISON: 9/9/2020 FINDINGS: There is no evidence of focal consolidation or evidence of pulmonary edema. There is no pleural effusion. The heart is enlarged. There are calcified mediastinal lymph nodes.     Cardiomegaly.    DX-KNEE 2- RIGHT    Result Date: 7/13/2022 7/13/2022 1:17 PM HISTORY/REASON FOR EXAM:  Atraumatic Pain/Swelling/Deformity. Unable to bear weight on right leg TECHNIQUE/EXAM DESCRIPTION AND NUMBER OF VIEWS:  2 views of the RIGHT knee. COMPARISON: None FINDINGS: There is a right knee arthroplasty present. The arthroplasty appears within normal limits. There are no fracture.     1.  Negative for fracture or malalignment 2.  Right knee arthroplasty appears within normal limits    DX-KNEE 2- LEFT    Result Date: 7/13/2022 7/13/2022 12:55 PM HISTORY/REASON FOR EXAM:   Atraumatic Pain/Swelling/Deformity. Left leg pain TECHNIQUE/EXAM DESCRIPTION AND NUMBER OF VIEWS:  2 views of the LEFT knee. COMPARISON: Left knee x-ray 9/13/2020 FINDINGS: There is no fracture. Alignment is normal. There is a left knee arthroplasty. There are new areas of lucency adjacent to the tibial component both medially and laterally. This is suspicious for prosthetic loosening or infection.     1.  New lucency adjacent to the tibial component of arthroplasty suspicious for loosening or infection 2.  No other finding    DX-SHOULDER 2+ LEFT    Result Date: 7/10/2022  7/10/2022 4:38 PM HISTORY/REASON FOR EXAM: Left-sided shoulder pain. TECHNIQUE/EXAM DESCRIPTION AND NUMBER OF VIEWS:  3 views of the LEFT shoulder. COMPARISON: None FINDINGS: Bone mineralization is normal. No evidence of acute fracture. There is old posttraumatic change of the distal clavicle with a nonunified fracture fragment. Soft tissues are normal.     1.  No evidence of acute fracture or dislocation. 2.  Old posttraumatic changes of the distal clavicle.    US-RENAL    Result Date: 7/10/2022  7/10/2022 7:06 PM HISTORY/REASON FOR EXAM:  Abnormal Labs TECHNIQUE/EXAM DESCRIPTION: Renal ultrasound. COMPARISON:  CT abdomen and pelvis 7/10/2022 FINDINGS: The right kidney measures 12.4 cm.  The right kidney appears normal in contour and parenchymal echotexture. The corticomedullary differentiation is preserved. The right renal collecting system is not dilated. No hydronephrosis. There are no renal calculi. The left kidney measures 10.8 cm. The left kidney appears normal in contour and parenchymal echotexture. The corticomedullary differentiation is preserved. The left renal collecting system is not dilated. No hydronephrosis. There are no renal calculi. The bladder is decompressed around a Amaro catheter.     1.  Unremarkable kidneys. 2.  Limited evaluation of the bladder.    US-EXTREMITY VENOUS UPPER UNILAT LEFT    Result Date: 7/11/2022   Upper  Extremity  Venous Duplex Report  Vascular Laboratory  CONCLUSIONS  1. No evidence of deep venous thrombosis.  2. Trace amount of fluid along the distal biceps, nonspecific, possibly  posttraumatic. No drainable fluid collections.  FRANCISCA TURNER  Exam Date:     2022 01:05  Room #:     Inpatient  Priority:     Stat  Ht (in):             Wt (lb):  Ordering Physician:        GHADA RAMOS  Referring Physician:       [id number] [place]  Sonographer:               Yana Haney [id number]  Study Type:                Complete Unilateral  Technical Quality:         Adequate  Age:    62    Gender:     F  MRN:    [id number]  :    1960      BSA:  Indications:     Localized swelling, mass and lump, unspecified upper limb,                   Edema, unspecified  CPT Codes:       68119  ICD Codes:       R22.30  R60.9  History:         Left upper extremity swelling/edema. No priror exams.  Limitations:  PROCEDURES:  Left upper extremity venous duplex imaging.  The following venous structures were evaluated: internal jugular,  subclavian, axillary, brachial, cephalic, and basilic veins.  Serial compression, color, and spectral Doppler flow evaluations were  performed.  FINDINGS:  Left upper extremity-  No evidence of deep venous thrombosis.  Evidence of subacute to chronic superficial venous thrombosis at the distal  cephalic vein.  All other veins demonstrate complete color filling and compressibility with  normal venous flow dynamics including spontaneous flow and respiratory  phasicity.  Flow was evaluated in the contralateral subclavian vein and normal venous  flow dynamics including spontaneous flow and respiratory phasic variation  were demonstrated.  Incidental Finding:  Non-vascularized anechoic structure seen at the left mid bicep.  Santosh Stephenson MD  (Electronically Signed)  Final Date:      2022                   03:24    EC-ECHOCARDIOGRAM COMPLETE W/O CONT    Result Date:  2022  Transthoracic Echo Report Echocardiography Laboratory CONCLUSIONS Normal left ventricular systolic function. The left ventricular ejection fraction is visually estimated to be 60%. Moderate concentric left ventricular hypertrophy. Mild mitral regurgitation. Normal right ventricular size and systolic function. FRANCISCA TURNER Exam Date:         2022                    15:30 Exam Location:     Inpatient Priority:          Routine Ordering Physician:        GHADA RAMOS Referring Physician:       [id number] [place] Sonographer:               Marybeth DAS Age:    62     Gender:    F MRN:    [id number] :    1960 BSA:    1.99   Ht (in):    67     Wt (lb):    192 Exam Type:     Complete Indications:     Shortness of breath ICD Codes:       R06.02 CPT Codes:       51079 BP:   73     /   45     HR:   96 Technical Quality:       Fair MEASUREMENTS  (Male / Female) Normal Values 2D ECHO LV Diastolic Diameter PLAX        5.2 cm                4.2 - 5.9 / 3.9 - 5.3 cm LV Systolic Diameter PLAX         3.7 cm                2.1 - 4.0 cm IVS Diastolic Thickness           1.6 cm                LVPW Diastolic Thickness          1.2 cm                LVOT Diameter                     2.2 cm                Estimated LV Ejection Fraction    60 %                  LV Ejection Fraction MOD BP       68.2 %                >= 55  % LV Ejection Fraction MOD 4C       65.7 %                LV Ejection Fraction MOD 2C       72.4 %                IVC Diameter                      1 cm                  DOPPLER AV Peak Velocity                  1.6 m/s               AV Peak Gradient                  10.2 mmHg             AV Mean Gradient                  6 mmHg                LVOT Peak Velocity                0.82 m/s              AV Area Cont Eq vti               1.5 cm2               Mitral E Point Velocity           1.4 m/s               TR Peak Velocity                  275 cm/s              * Indicates values  subject to auto-interpretation LV EF:  60    % FINDINGS Left Ventricle Normal left ventricular chamber size. Moderate concentric left ventricular hypertrophy. Normal left ventricular systolic function. The left ventricular ejection fraction is visually estimated to be 60%. Normal diastolic function. Right Ventricle Normal right ventricular size and systolic function. Right Atrium Normal right atrial size. Normal inferior vena cava size and inspiratory collapse. Left Atrium Mildly dilated left atrium. Mitral Valve Structurally normal mitral valve. No mitral stenosis. Mild mitral regurgitation. Aortic Valve Structurally normal aortic valve without stenosis or regurgitation. The aortic valve appears trileaflet. Tricuspid Valve Structurally normal tricuspid valve. No tricuspid stenosis. Mild tricuspid regurgitation. Estimated right ventricular systolic pressure is 34  mmHg. Pulmonic Valve Structurally normal pulmonic valve without stenosis or regurgitation. Pericardium No pericardial effusion. Aorta Normal aortic root for body surface area. The ascending aorta diameter is 3.1 cm. Grant Jimenez M.D. (Electronically Signed) Final Date:     14 July 2022                 16:47    X-ray Shoulder 2+ View (Complete) Left    Result Date: 7/6/2022  CLINICAL DATA: Shoulder Pain, TECHNICAL: 3 views. COMPARISON: April 18, 2021 FINDINGS: The left humeral head is subluxed inferior relative to the typical anatomic relationship with the glenoid fossa.  There is increased tissue or fluid density in the subacromial space. Left glenohumeral joint space remains visible on frontal view. Old fracture deformity of the distal left clavicle.    INFERIOR SUBLUXATION OF THE LEFT HUMERAL HEAD RELATIVE TO THE GLENOID WITH INCREASED SUBACROMIAL DENSITY WHICH COULD BE RELATED TO SOFT TISSUE SWELLING OR EFFUSION. NO ACUTE FRACTURE. OLD FRACTURE DEFORMITY OF THE DISTAL LEFT CLAVICLE.      Micro:  Results     Procedure Component Value Units  "Date/Time    C Diff by PCR rflx Toxin [[id number]] Collected: 07/14/22 1024    Order Status: Completed Specimen: Stool Updated: 07/14/22 1203     C Diff by PCR Negative     Comment: C. difficile NOT detected by PCR.  Treatment not indicated per guidelines.  Repeat testing not indicated within 7 days.          027-NAP1-BI Presumptive Negative     Comment: Presumptive 027/NAP1/BI target DNA sequences are NOT DETECTED.       Narrative:      Special Contact Isolation  Collected By: [id number] EM ARIAS  Does this patient have risk factors for C-diff?->Yes  C-Diff Risk Factors->antibiotic exposure  ** recent stay at other hospital    BLOOD CULTURE [[id number]] Collected: 07/13/22 1344    Order Status: Completed Specimen: Blood from Peripheral Updated: 07/14/22 0832     Significant Indicator NEG     Source BLD     Site PERIPHERAL     Culture Result No Growth  Note: Blood cultures are incubated for 5 days and  are monitored continuously.Positive blood cultures  are called to the RN and reported as soon as  they are identified.      Narrative:      Per Hospital Policy: Only change Specimen Src: to \"Line\" if  specified by physician order.  Left Hand    BLOOD CULTURE [[id number]] Collected: 07/13/22 1344    Order Status: Completed Specimen: Blood from Peripheral Updated: 07/14/22 0832     Significant Indicator NEG     Source BLD     Site PERIPHERAL     Culture Result No Growth  Note: Blood cultures are incubated for 5 days and  are monitored continuously.Positive blood cultures  are called to the RN and reported as soon as  they are identified.      Narrative:      Per Hospital Policy: Only change Specimen Src: to \"Line\" if  specified by physician order.  Right AC    Blood Culture [[id number]]  (Abnormal)  (Susceptibility) Collected: 07/10/22 1555    Order Status: Completed Specimen: Blood from Peripheral Updated: 07/13/22 0747     Significant Indicator POS     Source BLD     Site PERIPHERAL     Culture Result Growth " "detected by Bactec instrument. 07/11/2022  03:01      Streptococcus agalactiae (Group B)    Narrative:      CALL  [place]  [facility] tel. [phone],  CALLED  [facility] tel. [phone] 07/11/2022, 03:04, RB PERF. RESULTS CALLED TO:  RN [id number]  1 of 2 for Blood Culture x 2 sites order. Per Hospital  Policy: Only change Specimen Src: to \"Line\" if specified by  physician order.  No site indicated    Susceptibility     Streptococcus agalactiae (group b) (1)     Antibiotic Interpretation Microscan   Method Status    Daptomycin Sensitive <=0.5 mcg/mL SHERRIE Final    Clindamycin Resistant >4 mcg/mL SHERRIE Final    Penicillin Sensitive <=0.03 mcg/mL SHERRIE Final                   Blood Culture [[id number]]  (Abnormal) Collected: 07/10/22 1719    Order Status: Completed Specimen: Blood from Peripheral Updated: 07/13/22 0747     Significant Indicator POS     Source BLD     Site PERIPHERAL     Culture Result Growth detected by Bactec instrument.      Streptococcus agalactiae (Group B)  See previous culture for sensitivity report.      Narrative:      [facility]  [place]  [phone],  [facility] [phone] 07/11/2022, 05:05, RB PERF. RESULTS CALLED TO:  [id number]  2 of 2 blood culture x2  Sites order. Per Hospital Policy:  Only change Specimen Src: to \"Line\" if specified by physician  order.  Right Hand    Urine Culture (NEW) [[id number]] Collected: 07/10/22 1808    Order Status: Completed Specimen: Urine Updated: 07/13/22 0726     Significant Indicator NEG     Source UR     Site -     Culture Result No growth at 48 hours.    Narrative:      Indication for culture:->Evaluation for sepsis without a  clear source of infection  Indication for culture:->Evaluation for sepsis without a    MRSA By PCR (Amp) [[id number]] Collected: 07/11/22 0107    Order Status: Completed Specimen: Respirate from Nares Updated: 07/11/22 0251     MRSA by PCR POSITIVE    Narrative:      Collected By: [id number] JUANCARLOS COLMENARES    MRSA By PCR (Amp) [[id number]]     Order " Status: Canceled Specimen: Respirate from Nares     Urinalysis [[id number]]  (Abnormal) Collected: 07/10/22 1808    Order Status: Completed Specimen: Urine Updated: 07/10/22 1845     Color DK Yellow     Character Cloudy     Specific Gravity 1.025     Ph 5.0     Glucose Negative mg/dL      Ketones Negative mg/dL      Protein 100 mg/dL      Bilirubin Small     Urobilinogen, Urine 1.0     Nitrite Negative     Leukocyte Esterase Trace     Occult Blood Moderate     Micro Urine Req Microscopic    Narrative:      Indication for culture:->Evaluation for sepsis without a  clear source of infection          Assessment:  Active Hospital Problems    Diagnosis    • *Bacteremia [R78.81]    • Left knee pain [M25.562]    • Iron deficiency anemia [D50.9]    • Acute cystitis without hematuria [N30.00]    • Hyponatremia [E87.1]    • ELIZABETH (acute kidney injury) (HCC) [N17.9]    • Hyperlipidemia [E78.5]    • Metabolic encephalopathy [G93.41]    • Atrial fibrillation (HCC) [I48.91]    • Cirrhosis (HCC) [K74.60]    • Hypertension [I10]    • Controlled type 2 diabetes mellitus with hyperglycemia, without long-term current use of insulin (HCC) [E11.65]      Interval 24 hours:      AF, O2 RA  Labs reviewed  Imaging personally reviewed both images and report.   Micro reviewed    Patient complaining of some pain in left knee.  Continued on antibiotics as below.    Assessment:  Patient is a 62-year-old woman with a history of cocaine and alcohol abuse, cirrhosis, type 2 diabetes mellitus, history of left knee arthroplasty secondary to infection with prior skin grafts, and atrial fibrillation on apixaban admitted on 7/10/2022 secondary to altered mentation and hypotension.  She was found to be in acute kidney injury with a creatinine of 5.35.  Urine drug screen was positive for opiates and oxycodone.  Urinalysis had significant pyuria.  CT scan of the abdomen revealed mild diffuse wall thickening of the urinary bladder related to infection versus  inflammation.  Blood cultures are now growing group B streptococcus.          Pertinent diagnoses:   Sepsis, improving   Group B streptococcus bacteremia   -Blood cultures on 7/10 positive GBS, penicillin sensitive  -Blood cultures on 7/13 are no growth to date  -TTE on 7/14 with no vegetations, trileaflet aortic valve, no significant valvular dysfunction, EF 60%  Urinary tract infection suspected due to pyuria, urine culture negative  Acute kidney injury, resolved   Left total knee arthroplasty, concern for infection   Chronic hyponatremia   Alcoholic cirrhosis   Chronic thrombocytopenia  Remote history of cocaine and alcohol abuse   Diarrhea  -C. difficile negative on 7/14      Plan:   -Continue IV ceftriaxone 2 g daily   -Follow repeat blood cultures - NGTD  - Check MRI of the left knee given findings of new lucency adjacent to the tibial component of the arthroplasty suspicious for loosening or infection in the setting of bacteremia   -Recommend orthopedic surgery evaluation     Dispo: Awaiting results of MRI knee and orthopedic evaluation  PICC: To be determined        Discussed with internal medicine, Dr. Bishop. ID will follow.

## 2022-07-16 NOTE — PROGRESS NOTES
[facility] Medicine Daily Progress Note    Date of Service  7/16/2022    Chief Complaint  April Alicia is a 62 y.o. female admitted 7/10/2022 with hypotension, AMS    Hospital Course  61yo PMHx coccaine and ETOH abuse though sober x 8 years per her report, cirrhosis, asthma, AFib on apixaban, DM, HTN. Took an unknown narcotic.  In ED responded to narcan, labs showing Na 121, Cl 89, Creat 5.35, CXR showing cardiomegally.  Admitted to the floor with AMS, HypoNa, dehydration, sepsis from urinary source.  Admitted to the floor.  Later on day of admission transferred to IMCU for hypotension.  Subsequent blood cultures + Strep Species    Bacteremia with group B -on Rocephin, repeated blood culture NTD-consulted ID  LUE swelling -ultrasound negative for DVT  Left knee pain -x-ray showed a new fluid retention, suspected infection -ordered MRI,   ELIZABETH-resolved  Iron deficiency anemia -holding IV iron replacement given ongoing infection    Interval Problem Update  Patient reported  left upper arm, legs, back and abdomen pain.  Reported nausea vomiting, diarrhea.  C. difficile negative.  Imodium as needed.    Patient still reports abdominal pain after treatment of UTI.  Reviewed CT abdomen 7/10, which showed mildly prominent fluid-filled left proximal small bowel, possible enteritis/focal ileus/ early obstruction.  Patient has diarrhea with negative C. Difficile.  Ordered KUB.  May add Flagyl if pain persist.     MRI knee still pending, consulted Ortho    BP in higher range- resume home losartan, discontinued midodrine    Bacteremia with group B -on Rocephin, repeated blood culture NTD-consulted ID  TTE showed a EF of 60%, negative for vegetation.     Likely need SNF, ordered    I have discussed this patient's plan of care and discharge plan at IDT rounds today with Case Management, Nursing, Nursing leadership, and other members of the IDT team.    Consultants/Specialty      Code Status  Full  Code    Disposition  Patient is not medically cleared for discharge.   Anticipate discharge to to home with close outpatient follow-up.  I have placed the appropriate orders for post-discharge needs.    Review of Systems  Review of Systems   Unable to perform ROS: Mental status change   Constitutional: Positive for malaise/fatigue. Negative for chills and fever.   HENT: Negative for nosebleeds and sore throat.    Eyes: Negative for blurred vision and double vision.   Respiratory: Negative for cough and shortness of breath.    Cardiovascular: Negative for chest pain, palpitations and leg swelling.   Gastrointestinal: Positive for abdominal pain. Negative for diarrhea, nausea and vomiting.   Genitourinary: Negative for dysuria.   Musculoskeletal: Positive for back pain, joint pain and myalgias.        L arm pain; left knee pain and swelling    Skin: Negative for rash.   Neurological: Negative for dizziness, loss of consciousness and headaches.        Physical Exam  Temp:  [36.8 °C (98.2 °F)-37.2 °C (98.9 °F)] 36.8 °C (98.2 °F)  Pulse:  [61-83] 77  Resp:  [16-18] 18  BP: (109-129)/(62-87) 109/62  SpO2:  [95 %-100 %] 96 %    Physical Exam  Vitals reviewed.   Constitutional:       General: She is not in acute distress.     Appearance: She is well-developed. She is ill-appearing. She is not diaphoretic.   HENT:      Head: Normocephalic and atraumatic.      Mouth/Throat:      Mouth: Mucous membranes are moist.   Eyes:      Extraocular Movements: Extraocular movements intact.      Conjunctiva/sclera: Conjunctivae normal.   Neck:      Vascular: No JVD.   Cardiovascular:      Rate and Rhythm: Normal rate and regular rhythm.      Pulses: Normal pulses.   Pulmonary:      Effort: Pulmonary effort is normal. No respiratory distress.      Breath sounds: No stridor. No wheezing or rales.   Abdominal:      Palpations: Abdomen is soft.      Tenderness: There is no abdominal tenderness. There is no guarding or rebound.    Musculoskeletal:         General: Swelling and tenderness present.      Cervical back: Neck supple. No rigidity or tenderness.      Right lower leg: No edema.      Left lower leg: No edema.      Comments: Left knee swelling, tenderness, no erythema  edema L arm   Skin:     General: Skin is warm and dry.      Comments: BL LE skin graft with pigmentation.  No erythema or swelling.    Neurological:      Mental Status: She is alert and oriented to person, place, and time. Mental status is at baseline.   Psychiatric:      Comments: Anxious         Fluids    Intake/Output Summary (Last 24 hours) at 7/16/2022 1258  Last data filed at 7/16/2022 0449  Gross per 24 hour   Intake 540 ml   Output 300 ml   Net 240 ml       Laboratory  Recent Labs     07/14/22  0225 07/15/22  0216 07/16/22  0427   WBC 9.0 9.6 10.8   RBC 4.18* 3.83* 3.88*   HEMOGLOBIN 11.8* 11.0* 10.9*   HEMATOCRIT 34.6* 33.9* 32.7*   MCV 82.8 88.5 84.3   MCH 28.2 28.7 28.1   MCHC 34.1 32.4* 33.3*   RDW 44.4 47.6 45.1   PLATELETCT 111* 119* 114*   MPV 9.2 10.1 9.0     Recent Labs     07/14/22  0225 07/15/22  0216 07/16/22  0427   SODIUM 134* 135 133*   POTASSIUM 3.5* 3.3* 3.9   CHLORIDE 105 106 106   CO2 18* 19* 19*   GLUCOSE 151* 124* 128*   BUN 28* 18 27*   CREATININE 0.69 0.47* 0.75   CALCIUM 8.1* 7.9* 7.6*                   Imaging  EC-ECHOCARDIOGRAM COMPLETE W/O CONT   Final Result      DX-CHEST-PORTABLE (1 VIEW)   Final Result      1.  Interstitial infiltrates versus edema.   2.  Cardiomegaly.      DX-KNEE 2- RIGHT   Final Result      1.  Negative for fracture or malalignment      2.  Right knee arthroplasty appears within normal limits      DX-KNEE 2- LEFT   Final Result      1.  New lucency adjacent to the tibial component of arthroplasty suspicious for loosening or infection      2.  No other finding      US-EXTREMITY VENOUS UPPER UNILAT LEFT   Final Result      US-RENAL   Final Result      1.  Unremarkable kidneys.   2.  Limited evaluation of the  bladder.      CT-ABDOMEN-PELVIS W/O   Final Result         Limited exam due to lack of IV contrast.         1. Mild diffuse wall thickening of the urinary bladder could relate to infection or inflammation. Correlate with UA. No hydronephrosis.   2. Nonspecific mildly prominent fluid-filled proximal small bowel in the left abdomen. This could be seen with enteritis, focal ileus or early obstruction.   3. Cirrhotic liver with portal hypertension and splenomegaly. Wall thickening of the ascending colon could relate to portal hypertension.   4. No ascites.      DX-SHOULDER 2+ LEFT   Final Result      1.  No evidence of acute fracture or dislocation.      2.  Old posttraumatic changes of the distal clavicle.      DX-CHEST-PORTABLE (1 VIEW)   Final Result      Cardiomegaly.      MR-KNEE-WITH & W/O LEFT    (Results Pending)   [id number] VIEW    (Results Pending)        Assessment/Plan  * Bacteremia  Assessment & Plan  2 bottles with group B strep await sensitivity and further specification  Patient reported history of severe bacteremia a year ago after knee surgery last year, which required skin grafting of both lower extremity and was hospitalized at [place] when she visited her son there    Cont Rocephin  Check TTE  Repeat blood cultures  Consulted ID      Left knee pain  Assessment & Plan  History of left knee replacement 2017, which was complicated with infection 2 weeks later  Patient had recurrent left knee infection last year which was complicated with bacteremia and she was hospitalized at the [place] hospital when she visited her son     She was not able to walk on her left knee because of pain     left knee swelling and tender, no significant erythema, clean scar    -x-ray showed new fluid retention, suspected infection   -ordered MRI  -Consulted Ortho    Hyponatremia  Assessment & Plan  123 on admission  High SG on UA: probable hypovolemic and alcohol use  Cont to monitor    Resolved    Acute cystitis without  hematuria  Assessment & Plan  On Rocephin for bacteremia    Iron deficiency anemia  Assessment & Plan  Iron study consistent with iron deficiency  Hgb 10.7 at admission, stable    holding IV iron replacement given ongoing infection    Metabolic encephalopathy  Assessment & Plan  Likely multifactorial in conjunction with narcotic use  S/p Narcan treatment, improved  Start lacutlose  Cont to treat infection     resolved      Hyperlipidemia- (present on admission)  Assessment & Plan  Continue Lipitor    ELIZABETH (acute kidney injury) (HCC)- (present on admission)  Assessment & Plan  Likely secondary dehydration  4.7 on admission   Good UOP  DC IVFs  Cont to monitor  Renally dose medications as appropriate    Resolved    Atrial fibrillation (HCC)- (present on admission)  Assessment & Plan  On apixaban and dronedarone as outpt    Cirrhosis (HCC)- (present on admission)  Assessment & Plan  Secondary to ETOH  Lactulose  monitor    Hypertension- (present on admission)  Assessment & Plan  Resume the home losartan    Controlled type 2 diabetes mellitus with hyperglycemia, without long-term current use of insulin (HCC)- (present on admission)  Assessment & Plan  Sliding-scale insulin, diabetic diet  Running mid to high 100s  Send A1c  On metformin  As outpt       VTE prophylaxis: enoxaparin ppx    I have performed a physical exam and reviewed and updated ROS and Plan today (7/16/2022). In review of yesterday's note (7/15/2022), there are no changes except as documented above.

## 2022-07-17 ENCOUNTER — ANESTHESIA (OUTPATIENT)
Dept: SURGERY | Facility: MEDICAL CENTER | Age: 62
DRG: 466 | End: 2022-07-17
Payer: MEDICAID

## 2022-07-17 ENCOUNTER — ANESTHESIA EVENT (OUTPATIENT)
Dept: SURGERY | Facility: MEDICAL CENTER | Age: 62
DRG: 466 | End: 2022-07-17
Payer: MEDICAID

## 2022-07-17 PROBLEM — M00.9 SEPTIC ARTHRITIS OF SHOULDER, LEFT (HCC): Status: ACTIVE | Noted: 2022-07-17

## 2022-07-17 LAB
ANION GAP SERPL CALC-SCNC: 7 MMOL/L (ref 7–16)
BUN SERPL-MCNC: 25 MG/DL (ref 8–22)
CALCIUM SERPL-MCNC: 7.8 MG/DL (ref 8.5–10.5)
CHLORIDE SERPL-SCNC: 107 MMOL/L (ref 96–112)
CO2 SERPL-SCNC: 18 MMOL/L (ref 20–33)
CREAT SERPL-MCNC: 0.68 MG/DL (ref 0.5–1.4)
ERYTHROCYTE [DISTWIDTH] IN BLOOD BY AUTOMATED COUNT: 46.5 FL (ref 35.9–50)
GFR SERPLBLD CREATININE-BSD FMLA CKD-EPI: 98 ML/MIN/1.73 M 2
GLUCOSE BLD STRIP.AUTO-MCNC: 100 MG/DL (ref 65–99)
GLUCOSE BLD STRIP.AUTO-MCNC: 109 MG/DL (ref 65–99)
GLUCOSE BLD STRIP.AUTO-MCNC: 350 MG/DL (ref 65–99)
GLUCOSE BLD STRIP.AUTO-MCNC: 94 MG/DL (ref 65–99)
GLUCOSE SERPL-MCNC: 90 MG/DL (ref 65–99)
GRAM STN SPEC: NORMAL
GRAM STN SPEC: NORMAL
HCT VFR BLD AUTO: 33.7 % (ref 37–47)
HGB BLD-MCNC: 11.1 G/DL (ref 12–16)
MCH RBC QN AUTO: 28.5 PG (ref 27–33)
MCHC RBC AUTO-ENTMCNC: 32.9 G/DL (ref 33.6–35)
MCV RBC AUTO: 86.4 FL (ref 81.4–97.8)
PLATELET # BLD AUTO: 126 K/UL (ref 164–446)
PMV BLD AUTO: 9.1 FL (ref 9–12.9)
POTASSIUM SERPL-SCNC: 4.4 MMOL/L (ref 3.6–5.5)
RBC # BLD AUTO: 3.9 M/UL (ref 4.2–5.4)
RHODAMINE-AURAMINE STN SPEC: NORMAL
SIGNIFICANT IND 70042: NORMAL
SITE SITE: NORMAL
SODIUM SERPL-SCNC: 132 MMOL/L (ref 135–145)
SOURCE SOURCE: NORMAL
WBC # BLD AUTO: 10.7 K/UL (ref 4.8–10.8)

## 2022-07-17 PROCEDURE — 87070 CULTURE OTHR SPECIMN AEROBIC: CPT

## 2022-07-17 PROCEDURE — A9270 NON-COVERED ITEM OR SERVICE: HCPCS | Performed by: ANESTHESIOLOGY

## 2022-07-17 PROCEDURE — A9270 NON-COVERED ITEM OR SERVICE: HCPCS | Performed by: STUDENT IN AN ORGANIZED HEALTH CARE EDUCATION/TRAINING PROGRAM

## 2022-07-17 PROCEDURE — 700101 HCHG RX REV CODE 250: Performed by: ANESTHESIOLOGY

## 2022-07-17 PROCEDURE — 87015 SPECIMEN INFECT AGNT CONCNTJ: CPT

## 2022-07-17 PROCEDURE — 160002 HCHG RECOVERY MINUTES (STAT): Performed by: ORTHOPAEDIC SURGERY

## 2022-07-17 PROCEDURE — A9270 NON-COVERED ITEM OR SERVICE: HCPCS | Performed by: HOSPITALIST

## 2022-07-17 PROCEDURE — 99233 SBSQ HOSP IP/OBS HIGH 50: CPT | Performed by: STUDENT IN AN ORGANIZED HEALTH CARE EDUCATION/TRAINING PROGRAM

## 2022-07-17 PROCEDURE — 87205 SMEAR GRAM STAIN: CPT | Mod: 91

## 2022-07-17 PROCEDURE — 700102 HCHG RX REV CODE 250 W/ 637 OVERRIDE(OP): Performed by: STUDENT IN AN ORGANIZED HEALTH CARE EDUCATION/TRAINING PROGRAM

## 2022-07-17 PROCEDURE — 160048 HCHG OR STATISTICAL LEVEL 1-5: Performed by: ORTHOPAEDIC SURGERY

## 2022-07-17 PROCEDURE — 99232 SBSQ HOSP IP/OBS MODERATE 35: CPT | Performed by: INTERNAL MEDICINE

## 2022-07-17 PROCEDURE — 700105 HCHG RX REV CODE 258: Performed by: ANESTHESIOLOGY

## 2022-07-17 PROCEDURE — 700111 HCHG RX REV CODE 636 W/ 250 OVERRIDE (IP): Performed by: INTERNAL MEDICINE

## 2022-07-17 PROCEDURE — 700102 HCHG RX REV CODE 250 W/ 637 OVERRIDE(OP): Performed by: ANESTHESIOLOGY

## 2022-07-17 PROCEDURE — 700101 HCHG RX REV CODE 250

## 2022-07-17 PROCEDURE — 0R9K0ZZ DRAINAGE OF LEFT SHOULDER JOINT, OPEN APPROACH: ICD-10-PCS | Performed by: ORTHOPAEDIC SURGERY

## 2022-07-17 PROCEDURE — 85027 COMPLETE CBC AUTOMATED: CPT

## 2022-07-17 PROCEDURE — 700111 HCHG RX REV CODE 636 W/ 250 OVERRIDE (IP): Performed by: ANESTHESIOLOGY

## 2022-07-17 PROCEDURE — 87206 SMEAR FLUORESCENT/ACID STAI: CPT

## 2022-07-17 PROCEDURE — 80048 BASIC METABOLIC PNL TOTAL CA: CPT

## 2022-07-17 PROCEDURE — 99140 ANES COMP EMERGENCY COND: CPT | Performed by: ANESTHESIOLOGY

## 2022-07-17 PROCEDURE — 00300 ANES ALL PX INTEG H/N/PTRUNK: CPT | Performed by: ANESTHESIOLOGY

## 2022-07-17 PROCEDURE — 82962 GLUCOSE BLOOD TEST: CPT | Mod: 91

## 2022-07-17 PROCEDURE — 770001 HCHG ROOM/CARE - MED/SURG/GYN PRIV*

## 2022-07-17 PROCEDURE — 160027 HCHG SURGERY MINUTES - 1ST 30 MINS LEVEL 2: Performed by: ORTHOPAEDIC SURGERY

## 2022-07-17 PROCEDURE — 87102 FUNGUS ISOLATION CULTURE: CPT

## 2022-07-17 PROCEDURE — 36415 COLL VENOUS BLD VENIPUNCTURE: CPT

## 2022-07-17 PROCEDURE — 160009 HCHG ANES TIME/MIN: Performed by: ORTHOPAEDIC SURGERY

## 2022-07-17 PROCEDURE — 160035 HCHG PACU - 1ST 60 MINS PHASE I: Performed by: ORTHOPAEDIC SURGERY

## 2022-07-17 PROCEDURE — 700102 HCHG RX REV CODE 250 W/ 637 OVERRIDE(OP): Performed by: HOSPITALIST

## 2022-07-17 PROCEDURE — 87075 CULTR BACTERIA EXCEPT BLOOD: CPT | Mod: 91

## 2022-07-17 PROCEDURE — 160038 HCHG SURGERY MINUTES - EA ADDL 1 MIN LEVEL 2: Performed by: ORTHOPAEDIC SURGERY

## 2022-07-17 PROCEDURE — 87116 MYCOBACTERIA CULTURE: CPT

## 2022-07-17 RX ORDER — ROCURONIUM BROMIDE 10 MG/ML
INJECTION, SOLUTION INTRAVENOUS PRN
Status: DISCONTINUED | OUTPATIENT
Start: 2022-07-17 | End: 2022-07-17 | Stop reason: SURG

## 2022-07-17 RX ORDER — HALOPERIDOL 5 MG/ML
1 INJECTION INTRAMUSCULAR
Status: DISCONTINUED | OUTPATIENT
Start: 2022-07-17 | End: 2022-07-17 | Stop reason: HOSPADM

## 2022-07-17 RX ORDER — SODIUM CHLORIDE 9 MG/ML
INJECTION, SOLUTION INTRAVENOUS
Status: DISCONTINUED | OUTPATIENT
Start: 2022-07-17 | End: 2022-07-17 | Stop reason: SURG

## 2022-07-17 RX ORDER — ONDANSETRON 2 MG/ML
4 INJECTION INTRAMUSCULAR; INTRAVENOUS
Status: DISCONTINUED | OUTPATIENT
Start: 2022-07-17 | End: 2022-07-17 | Stop reason: HOSPADM

## 2022-07-17 RX ORDER — ESMOLOL HYDROCHLORIDE 10 MG/ML
INJECTION INTRAVENOUS PRN
Status: DISCONTINUED | OUTPATIENT
Start: 2022-07-17 | End: 2022-07-17 | Stop reason: SURG

## 2022-07-17 RX ORDER — PHENYLEPHRINE HCL IN 0.9% NACL 0.5 MG/5ML
SYRINGE (ML) INTRAVENOUS PRN
Status: DISCONTINUED | OUTPATIENT
Start: 2022-07-17 | End: 2022-07-17 | Stop reason: SURG

## 2022-07-17 RX ORDER — LIDOCAINE HYDROCHLORIDE 20 MG/ML
INJECTION, SOLUTION EPIDURAL; INFILTRATION; INTRACAUDAL; PERINEURAL PRN
Status: DISCONTINUED | OUTPATIENT
Start: 2022-07-17 | End: 2022-07-17 | Stop reason: SURG

## 2022-07-17 RX ORDER — MEPERIDINE HYDROCHLORIDE 25 MG/ML
12.5 INJECTION INTRAMUSCULAR; INTRAVENOUS; SUBCUTANEOUS
Status: DISCONTINUED | OUTPATIENT
Start: 2022-07-17 | End: 2022-07-17 | Stop reason: HOSPADM

## 2022-07-17 RX ORDER — SODIUM CHLORIDE, SODIUM GLUCONATE, SODIUM ACETATE, POTASSIUM CHLORIDE AND MAGNESIUM CHLORIDE 526; 502; 368; 37; 30 MG/100ML; MG/100ML; MG/100ML; MG/100ML; MG/100ML
500 INJECTION, SOLUTION INTRAVENOUS CONTINUOUS
Status: DISCONTINUED | OUTPATIENT
Start: 2022-07-17 | End: 2022-07-17 | Stop reason: HOSPADM

## 2022-07-17 RX ORDER — HYDROMORPHONE HYDROCHLORIDE 1 MG/ML
0.4 INJECTION, SOLUTION INTRAMUSCULAR; INTRAVENOUS; SUBCUTANEOUS
Status: DISCONTINUED | OUTPATIENT
Start: 2022-07-17 | End: 2022-07-17 | Stop reason: HOSPADM

## 2022-07-17 RX ORDER — OXYCODONE HCL 5 MG/5 ML
10 SOLUTION, ORAL ORAL
Status: COMPLETED | OUTPATIENT
Start: 2022-07-17 | End: 2022-07-17

## 2022-07-17 RX ORDER — OXYCODONE HCL 5 MG/5 ML
5 SOLUTION, ORAL ORAL
Status: COMPLETED | OUTPATIENT
Start: 2022-07-17 | End: 2022-07-17

## 2022-07-17 RX ORDER — MIDAZOLAM HYDROCHLORIDE 1 MG/ML
1 INJECTION INTRAMUSCULAR; INTRAVENOUS
Status: DISCONTINUED | OUTPATIENT
Start: 2022-07-17 | End: 2022-07-17 | Stop reason: HOSPADM

## 2022-07-17 RX ORDER — ONDANSETRON 2 MG/ML
INJECTION INTRAMUSCULAR; INTRAVENOUS PRN
Status: DISCONTINUED | OUTPATIENT
Start: 2022-07-17 | End: 2022-07-17 | Stop reason: SURG

## 2022-07-17 RX ORDER — IPRATROPIUM BROMIDE AND ALBUTEROL SULFATE 2.5; .5 MG/3ML; MG/3ML
3 SOLUTION RESPIRATORY (INHALATION)
Status: DISCONTINUED | OUTPATIENT
Start: 2022-07-17 | End: 2022-07-17 | Stop reason: HOSPADM

## 2022-07-17 RX ORDER — DIPHENHYDRAMINE HYDROCHLORIDE 50 MG/ML
12.5 INJECTION INTRAMUSCULAR; INTRAVENOUS
Status: DISCONTINUED | OUTPATIENT
Start: 2022-07-17 | End: 2022-07-17 | Stop reason: HOSPADM

## 2022-07-17 RX ORDER — MIDAZOLAM HYDROCHLORIDE 1 MG/ML
INJECTION INTRAMUSCULAR; INTRAVENOUS PRN
Status: DISCONTINUED | OUTPATIENT
Start: 2022-07-17 | End: 2022-07-17 | Stop reason: SURG

## 2022-07-17 RX ORDER — HYDROMORPHONE HYDROCHLORIDE 1 MG/ML
1 INJECTION, SOLUTION INTRAMUSCULAR; INTRAVENOUS; SUBCUTANEOUS
Status: DISCONTINUED | OUTPATIENT
Start: 2022-07-17 | End: 2022-07-17 | Stop reason: HOSPADM

## 2022-07-17 RX ORDER — HYDROMORPHONE HYDROCHLORIDE 1 MG/ML
0.2 INJECTION, SOLUTION INTRAMUSCULAR; INTRAVENOUS; SUBCUTANEOUS
Status: DISCONTINUED | OUTPATIENT
Start: 2022-07-17 | End: 2022-07-17 | Stop reason: HOSPADM

## 2022-07-17 RX ADMIN — LOSARTAN POTASSIUM 100 MG: 50 TABLET, FILM COATED ORAL at 05:10

## 2022-07-17 RX ADMIN — Medication 100 MCG: at 13:27

## 2022-07-17 RX ADMIN — MIDAZOLAM HYDROCHLORIDE 1 MG: 1 INJECTION, SOLUTION INTRAMUSCULAR; INTRAVENOUS at 14:24

## 2022-07-17 RX ADMIN — FENTANYL CITRATE 25 MCG: 50 INJECTION, SOLUTION INTRAMUSCULAR; INTRAVENOUS at 13:48

## 2022-07-17 RX ADMIN — FENTANYL CITRATE 25 MCG: 50 INJECTION, SOLUTION INTRAMUSCULAR; INTRAVENOUS at 13:27

## 2022-07-17 RX ADMIN — SUGAMMADEX 343 MG: 100 INJECTION, SOLUTION INTRAVENOUS at 14:26

## 2022-07-17 RX ADMIN — DRONEDARONE 400 MG: 400 TABLET, FILM COATED ORAL at 17:27

## 2022-07-17 RX ADMIN — ATORVASTATIN CALCIUM 20 MG: 20 TABLET, FILM COATED ORAL at 20:48

## 2022-07-17 RX ADMIN — ESMOLOL HYDROCHLORIDE 20 MG: 10 INJECTION INTRAVENOUS at 14:24

## 2022-07-17 RX ADMIN — OXYCODONE 5 MG: 5 TABLET ORAL at 20:48

## 2022-07-17 RX ADMIN — INSULIN HUMAN 12 UNITS: 100 INJECTION, SOLUTION PARENTERAL at 20:46

## 2022-07-17 RX ADMIN — CEFTRIAXONE SODIUM 2 G: 10 INJECTION, POWDER, FOR SOLUTION INTRAVENOUS at 17:28

## 2022-07-17 RX ADMIN — ONDANSETRON 4 MG: 2 INJECTION INTRAMUSCULAR; INTRAVENOUS at 14:24

## 2022-07-17 RX ADMIN — PROPOFOL 100 MG: 10 INJECTION, EMULSION INTRAVENOUS at 13:27

## 2022-07-17 RX ADMIN — OXYCODONE 5 MG: 5 TABLET ORAL at 09:30

## 2022-07-17 RX ADMIN — LIDOCAINE 1 PATCH: 700 PATCH TOPICAL at 05:11

## 2022-07-17 RX ADMIN — EPHEDRINE SULFATE 5 MG: 50 INJECTION, SOLUTION INTRAVENOUS at 13:27

## 2022-07-17 RX ADMIN — ROCURONIUM BROMIDE 50 MG: 10 INJECTION, SOLUTION INTRAVENOUS at 13:27

## 2022-07-17 RX ADMIN — SODIUM CHLORIDE: 9 INJECTION, SOLUTION INTRAVENOUS at 13:15

## 2022-07-17 RX ADMIN — LACTULOSE 15 ML: 20 SOLUTION ORAL at 05:11

## 2022-07-17 RX ADMIN — LIDOCAINE HYDROCHLORIDE 20 MG: 20 INJECTION, SOLUTION EPIDURAL; INFILTRATION; INTRACAUDAL at 13:27

## 2022-07-17 RX ADMIN — OXYCODONE 5 MG: 5 TABLET ORAL at 05:10

## 2022-07-17 RX ADMIN — OXYCODONE HYDROCHLORIDE 10 MG: 5 SOLUTION ORAL at 15:06

## 2022-07-17 RX ADMIN — DRONEDARONE 400 MG: 400 TABLET, FILM COATED ORAL at 09:28

## 2022-07-17 RX ADMIN — ESMOLOL HYDROCHLORIDE 20 MG: 100 INJECTION, SOLUTION INTRAVENOUS at 13:49

## 2022-07-17 ASSESSMENT — ENCOUNTER SYMPTOMS
BACK PAIN: 1
BLURRED VISION: 0
ABDOMINAL PAIN: 1
CHILLS: 0
DIZZINESS: 0
DIARRHEA: 0
LOSS OF CONSCIOUSNESS: 0
NAUSEA: 0
SORE THROAT: 0
HEADACHES: 0
MYALGIAS: 1
SPUTUM PRODUCTION: 0
FEVER: 0
SHORTNESS OF BREATH: 0
COUGH: 0
ABDOMINAL PAIN: 0
CONSTIPATION: 0
VOMITING: 0
PALPITATIONS: 0
NERVOUS/ANXIOUS: 0
DOUBLE VISION: 0

## 2022-07-17 ASSESSMENT — PAIN DESCRIPTION - PAIN TYPE
TYPE: ACUTE PAIN
TYPE: CHRONIC PAIN
TYPE: ACUTE PAIN
TYPE: CHRONIC PAIN
TYPE: CHRONIC PAIN
TYPE: CHRONIC PAIN;SURGICAL PAIN

## 2022-07-17 NOTE — OR SURGEON
Immediate Post OP Note    PreOp Diagnosis: Left shoulder septic arthritis and associated subdeltoid abscess      PostOp Diagnosis: same      Procedure(s):  IRRIGATION AND DEBRIDEMENT, SHOULDER - Wound Class: Dirty or Infected    Surgeon(s):  Obdulio Gray M.D.    Anesthesiologist/Type of Anesthesia:  Anesthesiologist: Lian Washington M.D./General    Surgical Staff:  Circulator: Christina Bishop R.N.  Relief Circulator: Felicitas Hager R.N.  Scrub Person: Marek Palomares  First Assist: Laura Rojas P.A.-C.    Specimens removed if any:  ID Type Source Tests Collected by Time Destination   1 : LEFT SHOULDER ABSCESS SWAB Body Fluid Shoulder AFB CULTURE, FUNGAL CULTURE, AEROBIC/ANAEROBIC CULTURE (SURGERY) Obdulio Gray M.D. 7/17/2022  1:52 PM    2 : LEFT SHOULDER ABSCESS FLUID Body Fluid Shoulder AFB CULTURE, FUNGAL CULTURE, AEROBIC/ANAEROBIC CULTURE (SURGERY) Obdulio Gray M.D. 7/17/2022  1:52 PM        Estimated Blood Loss: 150cc    Findings: see dictation    Complications: none known    Plan:  --readmit medicine postop  --continue abx, fu ID recs and intraop cultures  --continue SONY drain          7/17/2022 2:34 PM Obdulio Gray M.D.

## 2022-07-17 NOTE — PROGRESS NOTES
62yoF with multiple medical issues admitted recently with sepsis.  Has infected left TKA and left shoulder septic arthritis with associated abscess.      S: NPO awaiting surgery.  Persistent pain in left shoulder.    O:    Vitals:    07/17/22 1233   BP: 121/67   Pulse: 78   Resp: 18   Temp: 36.8 °C (98.2 °F)   SpO2: 93%     Exam:  General-NAD, following commands  LUE-moderate swelling in shoulder, NVI distally  LLE-knee effusion, limited ROM, knee warm to touch, NVI distally    A: 62yoF with multiple medical issues admitted recently with sepsis.  Has infected left TKA based on fluid analysis radiographic findings and left shoulder septic arthritis with associated abscess confirmed on MRI.      Recs:  --Planning left shoulder incision and drainage for septic arthritis/abscess today  --will discuss with Dr. Luz regarding surgical management for left septic TKA  --continue abx, fu ID recs    Recs:

## 2022-07-17 NOTE — ANESTHESIA TIME REPORT
Anesthesia Start and Stop Event Times     Date Time Event    7/17/2022 1230 Ready for Procedure     1315 Anesthesia Start     1438 Anesthesia Stop        Responsible Staff  07/17/22    Name Role Begin End    Lian Washington M.D. Anesth 1315 1438        Overtime Reason:  no overtime (within assigned shift)    Comments:

## 2022-07-17 NOTE — CARE PLAN
The patient is Stable - Low risk of patient condition declining or worsening    Shift Goals  Clinical Goals: Improve mobility, BP management  Patient Goals: Mobility  Family Goals: EDMUND    Progress made toward(s) clinical / shift goals: Patient progressing slowly this shift. MRI of knee complete. A&O x 4. Skin intact, has some healed grafts to bilateral shins. NPO since midnight for possible procedure today. Continuing with room air O2. Not moving well, max assist to get up and does not tolerate more than a few steps. Glucose ACHS. Discharge plan is home once cleared.       Problem: Knowledge Deficit - Standard  Goal: Patient and family/care givers will demonstrate understanding of plan of care, disease process/condition, diagnostic tests and medications  Outcome: Progressing     Problem: Pain - Standard  Goal: Alleviation of pain or a reduction in pain to the patient’s comfort goal  Outcome: Progressing     Problem: Skin Integrity  Goal: Skin integrity is maintained or improved  Outcome: Progressing     Problem: Fall Risk  Goal: Patient will remain free from falls  Outcome: Progressing

## 2022-07-17 NOTE — CONSULTS
DATE OF SERVICE:  07/16/2022     ORTHOPEDIC CONSULTATION     REQUESTING PHYSICIAN:  Adilene Bishop MD, hospitalist service.     REASON FOR CONSULTATION:  Bacteremia and left knee pain and swelling, history   of knee arthroplasty.     HISTORY OF PRESENT ILLNESS:  The patient is a 62-year-old female.  She was   admitted to the hospitalist service on 07/10/2022.  She was admitted with   altered mental status, hyponatremia, dehydration, sepsis, thought to be from a   urinary source initially.  I was consulted today to provide treatment   recommendations given concern for left knee swelling and a history of knee   arthroplasty in the past by Dr. Faustin.  She is also complaining of left   shoulder pain for about a week and states she has not been able to move it.     PAST MEDICAL HISTORY:  ALLERGIES:  No known drug allergies.     OUTPATIENT MEDICATIONS:  Include Colace, Lasix, potassium chloride, Lipitor,   Protonix, Eliquis, metformin, felodipine, gabapentin, albuterol, losartan.     PAST MEDICAL DIAGNOSES:  Include history of DVT, COPD, cardiac valve disease,   hypertension, liver disease, seizure disorder, chronic pain, diabetes, asthma,   arrhythmia.     PAST SURGICAL HISTORY:  Includes hysteroscopy and tubal ligation, right knee   arthroplasty, left knee arthroplasty, abdominal surgery, cardiac surgery.     SOCIAL HISTORY:  The patient quit smoking in 2016.  Denies alcohol use, denies   illicit drug use.     PHYSICAL EXAMINATION:  VITAL SIGNS:  Temperature 98.2, heart rate 77, respiratory rate 18, blood   pressure 109/62, pulse oximetry 96% on room air.  GENERAL APPEARANCE:  The patient is alert, following commands.  MUSCULOSKELETAL:  Left lower extremity, she has a healed midline surgical   scar.  She has a large knee effusion and the knee is warm to touch.  She has   limited knee extension by about 20 degrees.  Knee flexion is limited to about   90 degrees.  She is able to dorsi and plantarflex her foot.  She has healed    skin grafts bilaterally to the lower extremities.  Left upper extremity, she   has minimal active shoulder range of motion.  She has pain with passive   internal and external rotation of the shoulder.  She is tender to palpation   anteriorly.  There are no obvious open wound, induration or erythema seen.    There is no obvious fluctuance noted.     PROCEDURE:  Using sterile technique, I was able to perform arthrocentesis of   the left knee suprapatellar area and I was able to aspirate 30 mL of cloudy   brownish-yellowish fluid from the knee joint.  This was sent to lab for   aerobic and anaerobic cultures, AFB and fungal cultures, total nucleated cell   count and crystal analysis.  I also using sterile technique performed   arthrocentesis of the left glenohumeral joint, was able to only yield about   0.5 mL of blood-tinged fluid.     ASSESSMENT:  A 62-year-old female with multiple medical comorbidities,   admitted with bacteremia, likely from a urologic source.  She does have a   history of left knee arthroplasty and some x-ray findings concerning for   osteolysis or infection.  Her knee arthrocentesis is by appearance consistent   with septic arthritis of her prosthesis.  The fluid was sent to lab for formal   evaluation.  She has left shoulder pain.  Arthrocentesis of the glenohumeral   joint is not consistent with septic arthritis, but cannot rule out any other   potential underlying infection in the shoulder area outside of the   glenohumeral joint.     RECOMMENDATIONS:  1.  I discussed these findings with the patient and I will follow up the final   fluid analysis, both the shoulder and the knee.  2.  I do have concern that she will likely need surgical intervention for her   left knee at some point, but she is medically stable at this point and I would   like to discuss with Dr. Luz, my colleague who is a joint replacement   specialist, treatment options with the patient at some point.  3.  I do feel she  would benefit from an MRI of the left shoulder just to rule   out any other potential source of infection in that area, but I canceled the   knee MRI as I do not feel that would be very beneficial at this point,   especially given likely would be limited from the metal artifact from her knee   arthroplasty.        ______________________________  MD MELBA Brady/LINNEA/IRENE    DD:  07/16/2022 15:32  DT:  07/16/2022 20:03    Job#:  [id number]

## 2022-07-17 NOTE — ANESTHESIA PROCEDURE NOTES
Peripheral IV    Date/Time: 7/17/2022 1:41 PM  Performed by: Lian Washington M.D.  Authorized by: Lain Washington M.D.     Size:  18 G  Laterality:  Right  Site Prep:  Alcohol  Technique:  Direct puncture  Attempts:  1

## 2022-07-17 NOTE — OR NURSING
Report given to april  RN via SBAR reviewed orders and patient history, no questions at this time.  Pt alert and oriented, resp even and nonlabored, in NAD, pt denies pain/nausea at this time. Pt moves all ext, follows commands and verbalized understanding  of poc and discharge/admission. Family notified via text/phone patient is moving to phase II/room. Will con't to monitor until patient has transitioned.  Belongings on bed-phone

## 2022-07-17 NOTE — ASSESSMENT & PLAN NOTE
MRI left shoulder showed septic arthritis/subdeltoid abscess    Ortho consulted,  s/p Left shoulder arthrotomy and lavage for infection with partial synovectomy. Left shoulder incision and drainage of the subdeltoid abscess 7/17.  SONY drain out 7/27.  fluid culture NTD  On daptomycin until 8/30.

## 2022-07-17 NOTE — CARE PLAN
The patient is Stable - Low risk of patient condition declining or worsening    Shift Goals: Antibiotics; pain control  Clinical Goals: Improve mobility; BP management; pain control  Patient Goals: Mobility; pain control  Family Goals: EDMUND    Progress made toward(s) clinical / shift goals:  see above    Patient is not progressing towards the following goals:

## 2022-07-17 NOTE — ANESTHESIA PROCEDURE NOTES
Airway    Date/Time: 7/17/2022 1:29 PM  Performed by: Lian Washington M.D.  Authorized by: Lian Washington M.D.     Location:  OR  Urgency:  Elective  Indications for Airway Management:  Respiratory failure and airway protection      Spontaneous Ventilation: absent    Sedation Level:  Deep  Preoxygenated: Yes    Patient Position:  Sniffing  Mask Difficulty Assessment:  0 - not attempted  Final Airway Type:  Endotracheal airway  Final Endotracheal Airway:  ETT  Cuffed: Yes    Technique Used for Successful ETT Placement:  Direct laryngoscopy    Insertion Site:  Oral  Blade Type:  Roni  Laryngoscope Blade/Videolaryngoscope Blade Size:  3  ETT Size (mm):  7.0  Measured from:  Gums  ETT to Gums (cm):  20  Placement Verified by: auscultation and capnometry    Cormack-Lehane Classification:  Grade I - full view of glottis  Number of Attempts at Approach:  1

## 2022-07-17 NOTE — ADDENDUM NOTE
Addendum  created 07/17/22 1550 by Lian Washington M.D.    Intraprocedure Event edited, Intraprocedure Meds edited

## 2022-07-17 NOTE — PROGRESS NOTES
Infectious Disease Progress Note    Author: Aurea Ramos M.D. Date & Time of service: 2022  8:11 AM    Chief Complaint:  Group B streptococcus bacteremia      Interval History:    AF, O2 RA, complaining of pain in left knee.      Review of Systems:  Review of Systems   Respiratory: Negative for cough, sputum production and shortness of breath.    Gastrointestinal: Negative for abdominal pain, constipation, diarrhea, nausea and vomiting.   Musculoskeletal: Positive for joint pain and myalgias.   Psychiatric/Behavioral: The patient is not nervous/anxious.        Hemodynamics:  Temp (24hrs), Av.8 °C (98.2 °F), Min:36.1 °C (97 °F), Max:37.7 °C (99.9 °F)  Temperature: 36.7 °C (98.1 °F)  Pulse  Av.6  Min: 44  Max: 126   Blood Pressure: 119/70       Physical Exam:  Physical Exam  Cardiovascular:      Rate and Rhythm: Normal rate and regular rhythm.      Heart sounds: Normal heart sounds.   Pulmonary:      Effort: Pulmonary effort is normal.      Breath sounds: Normal breath sounds.   Abdominal:      General: Abdomen is flat. Bowel sounds are normal.      Palpations: Abdomen is soft.   Musculoskeletal:      Comments: Left knee with edema, warmth and tenderness   Skin:     General: Skin is warm and dry.   Neurological:      General: No focal deficit present.      Mental Status: She is oriented to person, place, and time.   Psychiatric:         Mood and Affect: Mood normal.         Behavior: Behavior normal.         Meds:    Current Facility-Administered Medications:   •  losartan  •  cefTRIAXone (ROCEPHIN) IV  •  loperamide  •  oxyCODONE immediate-release  •  lidocaine  •  POC Blood Glucose **AND** insulin regular  •  Notify MD and PharmD if FSBG level is less than or equal to 70 mg/dL or patient is showing signs/symptoms of hypoglycemia (tachycardia, palpitations, diaphoresis, clammy, tremulousness, nausea, confused) **AND** Administer 20 grams of glucose (approximately 8 ounces of fruit juice)  every 15 minutes PRN FSBS less than 70 mg/dL **AND** dextrose bolus  •  lactulose  •  dronedarone  •  albuterol  •  atorvastatin  •  acetaminophen    Labs:  Recent Labs     07/15/22  0216 07/16/22  0427 07/17/22  0140   WBC 9.6 10.8 10.7   RBC 3.83* 3.88* 3.90*   HEMOGLOBIN 11.0* 10.9* 11.1*   HEMATOCRIT 33.9* 32.7* 33.7*   MCV 88.5 84.3 86.4   MCH 28.7 28.1 28.5   RDW 47.6 45.1 46.5   PLATELETCT 119* 114* 126*   MPV 10.1 9.0 9.1     Recent Labs     07/15/22  0216 07/16/22  0427 07/17/22  0140   SODIUM 135 133* 132*   POTASSIUM 3.3* 3.9 4.4   CHLORIDE 106 106 107   CO2 19* 19* 18*   GLUCOSE 124* 128* 90   BUN 18 27* 25*     Recent Labs     07/15/22  0216 07/16/22  0427 07/17/22  0140   CREATININE 0.47* 0.75 0.68       Imaging:  CT-ABDOMEN-PELVIS W/O    Result Date: 7/10/2022  7/10/2022 4:46 PM HISTORY/REASON FOR EXAM:  Abdominal pain, fever. TECHNIQUE/EXAM DESCRIPTION: CT scan of the abdomen and pelvis without contrast. Noncontrast helical scanning was obtained from the diaphragmatic domes through the pubic symphysis. Low dose optimization technique was utilized for this CT exam including automated exposure control and adjustment of the mA and/or kV according to patient size. COMPARISON: None. FINDINGS: Lower Chest: Minimal bibasilar atelectasis. Liver: Cirrhotic appearing liver. Spleen: Enlarged. Pancreas: Unremarkable. Gallbladder: Distended gallbladder. Biliary: No biliary dilatation.. Adrenal glands: Nonenlarged. Kidneys: No renal calculus. No hydronephrosis.. Bowel: Wall thickening of the descending colon. Nonspecific mildly prominent fluid-filled small bowel in the left abdomen. Moderate amount of stool throughout the colon. Lymph nodes: No adenopathy. Vasculature: The abdominal aorta is normal in caliber Peritoneum: Unremarkable without ascites. Musculoskeletal: No aggressive osseous lesion. Moderate degenerative change of the lumbar spine. Pelvis: Mild diffuse wall thickening of the urinary bladder.. No  pelvic free fluid.     Limited exam due to lack of IV contrast. 1. Mild diffuse wall thickening of the urinary bladder could relate to infection or inflammation. Correlate with UA. No hydronephrosis. 2. Nonspecific mildly prominent fluid-filled proximal small bowel in the left abdomen. This could be seen with enteritis, focal ileus or early obstruction. 3. Cirrhotic liver with portal hypertension and splenomegaly. Wall thickening of the ascending colon could relate to portal hypertension. 4. No ascites.    [id number] VIEW    Result Date: 7/16/2022 7/16/2022 2:08 PM HISTORY/REASON FOR EXAM:  Abdominal Pain. TECHNIQUE/EXAM DESCRIPTION AND NUMBER OF VIEWS:  1 view(s) of the abdomen. COMPARISON: CT 7/10/2022 FINDINGS: No free air under the diaphragm.  No significant dilated small bowel loops or air-fluid levels. There is air seen throughout the colon. Amaro catheter in place. No suspicious calcifications. No acute osseous abnormality. Spondylosis.     Nonobstructive bowel gas pattern.    DX-CHEST-PORTABLE (1 VIEW)    Result Date: 7/14/2022 7/14/2022 1:12 AM HISTORY/REASON FOR EXAM:  Shortness of Breath TECHNIQUE/EXAM DESCRIPTION AND NUMBER OF VIEWS: Single portable view of the chest. COMPARISON: 7/10/2022 FINDINGS: HEART: Enlarged. There is atherosclerotic calcification in the aortic arch. LUNGS: Low lung volumes. Increased interstitial opacities.. Calcified mediastinal nodes. PLEURA: No effusion or pneumothorax.     1.  Interstitial infiltrates versus edema. 2.  Cardiomegaly.    DX-CHEST-PORTABLE (1 VIEW)    Result Date: 7/10/2022  7/10/2022 4:02 PM HISTORY/REASON FOR EXAM: Hypotension. Sepsis. TECHNIQUE/EXAM DESCRIPTION AND NUMBER OF VIEWS: Single portable view of the chest. COMPARISON: 9/9/2020 FINDINGS: There is no evidence of focal consolidation or evidence of pulmonary edema. There is no pleural effusion. The heart is enlarged. There are calcified mediastinal lymph nodes.     Cardiomegaly.    DX-KNEE 2-  RIGHT    Result Date: 7/13/2022 7/13/2022 1:17 PM HISTORY/REASON FOR EXAM:  Atraumatic Pain/Swelling/Deformity. Unable to bear weight on right leg TECHNIQUE/EXAM DESCRIPTION AND NUMBER OF VIEWS:  2 views of the RIGHT knee. COMPARISON: None FINDINGS: There is a right knee arthroplasty present. The arthroplasty appears within normal limits. There are no fracture.     1.  Negative for fracture or malalignment 2.  Right knee arthroplasty appears within normal limits    DX-KNEE 2- LEFT    Result Date: 7/13/2022 7/13/2022 12:55 PM HISTORY/REASON FOR EXAM:  Atraumatic Pain/Swelling/Deformity. Left leg pain TECHNIQUE/EXAM DESCRIPTION AND NUMBER OF VIEWS:  2 views of the LEFT knee. COMPARISON: Left knee x-ray 9/13/2020 FINDINGS: There is no fracture. Alignment is normal. There is a left knee arthroplasty. There are new areas of lucency adjacent to the tibial component both medially and laterally. This is suspicious for prosthetic loosening or infection.     1.  New lucency adjacent to the tibial component of arthroplasty suspicious for loosening or infection 2.  No other finding    DX-SHOULDER 2+ LEFT    Result Date: 7/10/2022  7/10/2022 4:38 PM HISTORY/REASON FOR EXAM: Left-sided shoulder pain. TECHNIQUE/EXAM DESCRIPTION AND NUMBER OF VIEWS:  3 views of the LEFT shoulder. COMPARISON: None FINDINGS: Bone mineralization is normal. No evidence of acute fracture. There is old posttraumatic change of the distal clavicle with a nonunified fracture fragment. Soft tissues are normal.     1.  No evidence of acute fracture or dislocation. 2.  Old posttraumatic changes of the distal clavicle.    US-RENAL    Result Date: 7/10/2022  7/10/2022 7:06 PM HISTORY/REASON FOR EXAM:  Abnormal Labs TECHNIQUE/EXAM DESCRIPTION: Renal ultrasound. COMPARISON:  CT abdomen and pelvis 7/10/2022 FINDINGS: The right kidney measures 12.4 cm.  The right kidney appears normal in contour and parenchymal echotexture. The corticomedullary differentiation is  preserved. The right renal collecting system is not dilated. No hydronephrosis. There are no renal calculi. The left kidney measures 10.8 cm. The left kidney appears normal in contour and parenchymal echotexture. The corticomedullary differentiation is preserved. The left renal collecting system is not dilated. No hydronephrosis. There are no renal calculi. The bladder is decompressed around a Amaro catheter.     1.  Unremarkable kidneys. 2.  Limited evaluation of the bladder.    US-EXTREMITY VENOUS UPPER UNILAT LEFT    Result Date: 2022   Upper Extremity  Venous Duplex Report  Vascular Laboratory  CONCLUSIONS  1. No evidence of deep venous thrombosis.  2. Trace amount of fluid along the distal biceps, nonspecific, possibly  posttraumatic. No drainable fluid collections.  FRANCISCA TURNER  Exam Date:     2022 01:05  Room #:     Inpatient  Priority:     Stat  Ht (in):             Wt (lb):  Ordering Physician:        GHADA RAMOS  Referring Physician:       [id number] [place]  Sonographer:               Yana Haney [id number]  Study Type:                Complete Unilateral  Technical Quality:         Adequate  Age:    62    Gender:     F  MRN:    [id number]  :    1960      BSA:  Indications:     Localized swelling, mass and lump, unspecified upper limb,                   Edema, unspecified  CPT Codes:       40716  ICD Codes:       R22.30  R60.9  History:         Left upper extremity swelling/edema. No priror exams.  Limitations:  PROCEDURES:  Left upper extremity venous duplex imaging.  The following venous structures were evaluated: internal jugular,  subclavian, axillary, brachial, cephalic, and basilic veins.  Serial compression, color, and spectral Doppler flow evaluations were  performed.  FINDINGS:  Left upper extremity-  No evidence of deep venous thrombosis.  Evidence of subacute to chronic superficial venous thrombosis at the distal  cephalic vein.  All other veins demonstrate complete  color filling and compressibility with  normal venous flow dynamics including spontaneous flow and respiratory  phasicity.  Flow was evaluated in the contralateral subclavian vein and normal venous  flow dynamics including spontaneous flow and respiratory phasic variation  were demonstrated.  Incidental Finding:  Non-vascularized anechoic structure seen at the left mid bicep.  Santosh Stephenson MD  (Electronically Signed)  Final Date:      2022                   03:24    EC-ECHOCARDIOGRAM COMPLETE W/O CONT    Result Date: 2022  Transthoracic Echo Report Echocardiography Laboratory CONCLUSIONS Normal left ventricular systolic function. The left ventricular ejection fraction is visually estimated to be 60%. Moderate concentric left ventricular hypertrophy. Mild mitral regurgitation. Normal right ventricular size and systolic function. FRANCISCA TURNER Exam Date:         2022                    15:30 Exam Location:     Inpatient Priority:          Routine Ordering Physician:        GHADA RAMOS Referring Physician:       [id number] [place] Sonographer:               Marybeth DAS Age:    62     Gender:    F MRN:    [id number] :    1960 BSA:    1.99   Ht (in):    67     Wt (lb):    192 Exam Type:     Complete Indications:     Shortness of breath ICD Codes:       R06.02 CPT Codes:       46653 BP:   73     /   45     HR:   96 Technical Quality:       Fair MEASUREMENTS  (Male / Female) Normal Values 2D ECHO LV Diastolic Diameter PLAX        5.2 cm                4.2 - 5.9 / 3.9 - 5.3 cm LV Systolic Diameter PLAX         3.7 cm                2.1 - 4.0 cm IVS Diastolic Thickness           1.6 cm                LVPW Diastolic Thickness          1.2 cm                LVOT Diameter                     2.2 cm                Estimated LV Ejection Fraction    60 %                  LV Ejection Fraction MOD BP       68.2 %                >= 55  % LV Ejection Fraction MOD 4C       65.7 %                 LV Ejection Fraction MOD 2C       72.4 %                IVC Diameter                      1 cm                  DOPPLER AV Peak Velocity                  1.6 m/s               AV Peak Gradient                  10.2 mmHg             AV Mean Gradient                  6 mmHg                LVOT Peak Velocity                0.82 m/s              AV Area Cont Eq vti               1.5 cm2               Mitral E Point Velocity           1.4 m/s               TR Peak Velocity                  275 cm/s              * Indicates values subject to auto-interpretation LV EF:  60    % FINDINGS Left Ventricle Normal left ventricular chamber size. Moderate concentric left ventricular hypertrophy. Normal left ventricular systolic function. The left ventricular ejection fraction is visually estimated to be 60%. Normal diastolic function. Right Ventricle Normal right ventricular size and systolic function. Right Atrium Normal right atrial size. Normal inferior vena cava size and inspiratory collapse. Left Atrium Mildly dilated left atrium. Mitral Valve Structurally normal mitral valve. No mitral stenosis. Mild mitral regurgitation. Aortic Valve Structurally normal aortic valve without stenosis or regurgitation. The aortic valve appears trileaflet. Tricuspid Valve Structurally normal tricuspid valve. No tricuspid stenosis. Mild tricuspid regurgitation. Estimated right ventricular systolic pressure is 34  mmHg. Pulmonic Valve Structurally normal pulmonic valve without stenosis or regurgitation. Pericardium No pericardial effusion. Aorta Normal aortic root for body surface area. The ascending aorta diameter is 3.1 cm. Grant Jimenez M.D. (Electronically Signed) Final Date:     14 July 2022                 16:47    X-ray Shoulder 2+ View (Complete) Left    Result Date: 7/6/2022  CLINICAL DATA: Shoulder Pain, TECHNICAL: 3 views. COMPARISON: April 18, 2021 FINDINGS: The left humeral head is subluxed inferior relative to the  typical anatomic relationship with the glenoid fossa.  There is increased tissue or fluid density in the subacromial space. Left glenohumeral joint space remains visible on frontal view. Old fracture deformity of the distal left clavicle.    INFERIOR SUBLUXATION OF THE LEFT HUMERAL HEAD RELATIVE TO THE GLENOID WITH INCREASED SUBACROMIAL DENSITY WHICH COULD BE RELATED TO SOFT TISSUE SWELLING OR EFFUSION. NO ACUTE FRACTURE. OLD FRACTURE DEFORMITY OF THE DISTAL LEFT CLAVICLE.      Micro:  Results     Procedure Component Value Units Date/Time    GRAM STAIN [[id number]] Collected: 07/16/22 1500    Order Status: Completed Specimen: Synovial Updated: 07/16/22 2037     Significant Indicator .     Source SYNO     Site Left Knee     Gram Stain Result Many WBCs.  No organisms seen.      Narrative:      Previous comment was modified by ROLANDO at 17:15 on 07/16/22.  LEFT KNEE  ?ordered per hard req  LEFT KNEE    Fungal Smear [[id number]] Collected: 07/16/22 1500    Order Status: Completed Specimen: Synovial Updated: 07/16/22 2037     Significant Indicator NEG     Source SYNO     Site Left Knee     Fungal Smear Results No fungal elements seen.    Narrative:      Previous comment was modified by ROLANDO at 17:15 on 07/16/22.  LEFT KNEE  ?ordered per hard req  LEFT KNEE    AFB Culture [[id number]] Collected: 07/16/22 1500    Order Status: No result Specimen: Synovial Updated: 07/16/22 2037     Significant Indicator NEG     Source SYNO     Site Left Knee     Culture Result -     AFB Smear Results -    Narrative:      Previous comment was modified by ROLANDO at 17:15 on 07/16/22.  LEFT KNEE  ?ordered per hard req  LEFT KNEE    Fungal Culture [[id number]] Collected: 07/16/22 1500    Order Status: No result Specimen: Synovial Updated: 07/16/22 2037     Significant Indicator NEG     Source SYNO     Site Left Knee     Culture Result -     Fungal Smear Results No fungal elements seen.    Narrative:      Previous comment was modified by ROLANDO at  17:15 on 07/16/22.  LEFT KNEE  ?ordered per hard req  LEFT KNEE    FLUID CULTURE W/GRAM STAIN [[id number]] Collected: 07/16/22 1500    Order Status: No result Specimen: Synovial Updated: 07/16/22 2037     Significant Indicator NEG     Source SYNO     Site Left Knee     Culture Result -     Gram Stain Result Many WBCs.  No organisms seen.      Narrative:      Previous comment was modified by ROLANDO at 17:15 on 07/16/22.  LEFT KNEE  ?ordered per hard req  LEFT KNEE    GRAM STAIN [[id number]] Collected: 07/16/22 1500    Order Status: Completed Specimen: Synovial Updated: 07/16/22 1849     Significant Indicator .     Source SYNO     Site Right Shoulder     Gram Stain Result Many WBCs.  No organisms seen.      Narrative:      Special Contact Isolation  RIGHT SHOULDER    FLUID CULTURE W/GRAM STAIN [[id number]] Collected: 07/16/22 1500    Order Status: No result Specimen: Synovial Updated: 07/16/22 1849     Significant Indicator NEG     Source SYNO     Site Right Shoulder     Culture Result -     Gram Stain Result Many WBCs.  No organisms seen.      Narrative:      Special Contact Isolation  RIGHT SHOULDER    FLUID CULTURE W/GRAM STAIN [[id number]]     Order Status: No result Specimen: Synovial Fluid     FLUID CULTURE W/GRAM STAIN [[id number]]     Order Status: No result Specimen: Body Fluid from Synovial Fluid     AFB Culture [[id number]]     Order Status: No result Specimen: Synovial Fluid     Fungal Culture [[id number]]     Order Status: No result Specimen: Synovial Fluid     C Diff by PCR rflx Toxin [[id number]] Collected: 07/14/22 1024    Order Status: Completed Specimen: Stool Updated: 07/14/22 1203     C Diff by PCR Negative     Comment: C. difficile NOT detected by PCR.  Treatment not indicated per guidelines.  Repeat testing not indicated within 7 days.          027-NAP1-BI Presumptive Negative     Comment: Presumptive 027/NAP1/BI target DNA sequences are NOT DETECTED.       Narrative:      Special Contact  "Isolation  Collected By: [id number] EM KWAN M.  Does this patient have risk factors for C-diff?->Yes  C-Diff Risk Factors->antibiotic exposure  ** recent stay at other hospital    BLOOD CULTURE [[id number]] Collected: 07/13/22 1344    Order Status: Completed Specimen: Blood from Peripheral Updated: 07/14/22 0832     Significant Indicator NEG     Source BLD     Site PERIPHERAL     Culture Result No Growth  Note: Blood cultures are incubated for 5 days and  are monitored continuously.Positive blood cultures  are called to the RN and reported as soon as  they are identified.      Narrative:      Per Hospital Policy: Only change Specimen Src: to \"Line\" if  specified by physician order.  Left Hand    BLOOD CULTURE [[id number]] Collected: 07/13/22 1344    Order Status: Completed Specimen: Blood from Peripheral Updated: 07/14/22 0832     Significant Indicator NEG     Source BLD     Site PERIPHERAL     Culture Result No Growth  Note: Blood cultures are incubated for 5 days and  are monitored continuously.Positive blood cultures  are called to the RN and reported as soon as  they are identified.      Narrative:      Per Hospital Policy: Only change Specimen Src: to \"Line\" if  specified by physician order.  Right AC    Blood Culture [[id number]]  (Abnormal)  (Susceptibility) Collected: 07/10/22 1555    Order Status: Completed Specimen: Blood from Peripheral Updated: 07/13/22 0747     Significant Indicator POS     Source BLD     Site PERIPHERAL     Culture Result Growth detected by Bactec instrument. 07/11/2022  03:01      Streptococcus agalactiae (Group B)    Narrative:      CALL  [place]  [facility] tel. [phone],  CALLED  [facility] tel. [phone] 07/11/2022, 03:04, RB PERF. RESULTS CALLED TO:  RN [id number]  1 of 2 for Blood Culture x 2 sites order. Per Hospital  Policy: Only change Specimen Src: to \"Line\" if specified by  physician order.  No site indicated    Susceptibility     Streptococcus agalactiae (group b) (1)     Antibiotic " "Interpretation Microscan   Method Status    Daptomycin Sensitive <=0.5 mcg/mL SHERRIE Final    Clindamycin Resistant >4 mcg/mL SHERRIE Final    Penicillin Sensitive <=0.03 mcg/mL SHERRIE Final                   Blood Culture [[id number]]  (Abnormal) Collected: 07/10/22 1719    Order Status: Completed Specimen: Blood from Peripheral Updated: 07/13/22 0747     Significant Indicator POS     Source BLD     Site PERIPHERAL     Culture Result Growth detected by Bactec instrument.      Streptococcus agalactiae (Group B)  See previous culture for sensitivity report.      Narrative:      CALL  [place]  [facility] tel. [phone],  CALLED  [facility] tel. [phone] 07/11/2022, 05:05, RB PERF. RESULTS CALLED TO:  [id number]  2 of 2 blood culture x2  Sites order. Per Hospital Policy:  Only change Specimen Src: to \"Line\" if specified by physician  order.  Right Hand    Urine Culture (NEW) [[id number]] Collected: 07/10/22 1808    Order Status: Completed Specimen: Urine Updated: 07/13/22 0726     Significant Indicator NEG     Source UR     Site -     Culture Result No growth at 48 hours.    Narrative:      Indication for culture:->Evaluation for sepsis without a  clear source of infection  Indication for culture:->Evaluation for sepsis without a    MRSA By PCR (Amp) [[id number]] Collected: 07/11/22 0107    Order Status: Completed Specimen: Respirate from Nares Updated: 07/11/22 0251     MRSA by PCR POSITIVE    Narrative:      [id number] [facility]    MRSA By PCR (Amp) [[id number]]     Order Status: Canceled Specimen: Respirate from Nares     Urinalysis [[id number]]  (Abnormal) Collected: 07/10/22 1808    Order Status: Completed Specimen: Urine Updated: 07/10/22 1845     Color DK Yellow     Character Cloudy     Specific Gravity 1.025     Ph 5.0     Glucose Negative mg/dL      Ketones Negative mg/dL      Protein 100 mg/dL      Bilirubin Small     Urobilinogen, Urine 1.0     Nitrite Negative     Leukocyte Esterase Trace     Occult Blood Moderate     " Micro Urine Req Microscopic    Narrative:      Indication for culture:->Evaluation for sepsis without a  clear source of infection          Assessment:  Active Hospital Problems    Diagnosis    • *Bacteremia [R78.81]    • Left knee pain [M25.562]    • Iron deficiency anemia [D50.9]    • Acute cystitis without hematuria [N30.00]    • Hyponatremia [E87.1]    • ELIZABETH (acute kidney injury) (HCC) [N17.9]    • Hyperlipidemia [E78.5]    • Metabolic encephalopathy [G93.41]    • Atrial fibrillation (HCC) [I48.91]    • Cirrhosis (HCC) [K74.60]    • Hypertension [I10]    • Controlled type 2 diabetes mellitus with hyperglycemia, without long-term current use of insulin (HCC) [E11.65]      Interval 24 hours:      AF, O2 RA  Labs reviewed  Studies reviewed   Micro reviewed    Patient complaining of some pain in left knee but overall unchanged.  Continued on ceftriaxone as below.    Assessment:  Patient is a 62-year-old woman with a history of cocaine and alcohol abuse, cirrhosis, type 2 diabetes mellitus, history of left knee arthroplasty secondary to infection with prior skin grafts, and atrial fibrillation on apixaban admitted on 7/10/2022 secondary to altered mentation and hypotension.  She was found to be in acute kidney injury with a creatinine of 5.35.  Urine drug screen was positive for opiates and oxycodone.  Urinalysis had significant pyuria.  CT scan of the abdomen revealed mild diffuse wall thickening of the urinary bladder related to infection versus inflammation.  Blood cultures are now growing group B streptococcus.          Pertinent diagnoses:   Sepsis, improved  Group B streptococcus bacteremia   -Blood cultures on 7/10 positive GBS, penicillin sensitive  -Blood cultures on 7/13 are no growth to date  -TTE on 7/14 with no vegetations, trileaflet aortic valve, no significant valvular dysfunction, EF 60%  Urinary tract infection suspected due to pyuria, urine culture negative  Acute kidney injury,  resolved   Prosthetic joint infection, left knee septic arthritis   -Evaluated by orthopedics with synovial fluid aspirated on 7/16, ,310 and 72% polys consistent with infection  -Orthopedics following and will confer, canceled MRI as not thought to be useful  Right shoulder infection versus inflammation  -Synovial fluid aspirated on 7/16, Bloody tap, ,000, WBC 29,802, 90% polys   -MRI right shoulder ordered  Chronic hyponatremia   Alcoholic cirrhosis   Chronic thrombocytopenia  Remote history of cocaine and alcohol abuse   Diarrhea  -C. difficile negative on 7/14      Plan:   -Continue IV ceftriaxone 2 g daily   -Follow repeat blood cultures - NGTD  --Follow-up MRI of the right shoulder   --Ortho following, will likely need washout or hardware removal of left knee and potential procedure on right shoulder   --Follow-up 7/16 synovial fluid cultures from left knee and right shoulder     Dispo: Awaiting orthopedic surgical decisions and MRI of shoulder  PICC: To be determined         Discussed with internal medicine, Dr. Bishop. ID will follow.

## 2022-07-17 NOTE — ANESTHESIA PREPROCEDURE EVALUATION
Case: [id number] Date/Time: 07/17/22 1343    Procedure: IRRIGATION AND DEBRIDEMENT, SHOULDER (Left Shoulder)    [place] [address] [place] SURG[place] [facility]    Surgeons: Obdulio Gray M.D.          Relevant Problems   PULMONARY   (positive) Acute exacerbation of chronic obstructive pulmonary disease (COPD) (HCC)      NEURO   (positive) History of hypertension   (positive) History of rheumatic fever   (positive) Medication overuse headache   (positive) Migraine with aura and without status migrainosus, not intractable      CARDIAC   (positive) Atrial fibrillation (HCC)   (positive) Hypertension   (positive) Migraine with aura and without status migrainosus, not intractable         (positive) ELIZABETH (acute kidney injury) (HCC)   (positive) Cirrhosis (HCC)   (positive) Cirrhosis, alcoholic (HCC)   (positive) Renal insufficiency syndrome       Physical Exam    Airway   Mallampati: III  TM distance: >3 FB  Neck ROM: limited       Cardiovascular - normal exam  Rhythm: regular  Rate: normal  (-) murmur     Dental - normal exam           Pulmonary - normal exam  Breath sounds clear to auscultation     Abdominal    Neurological - normal exam         Other findings: Multiple medical problems, sepsis            Anesthesia Plan    ASA 3- EMERGENT       Plan - general       Airway plan will be LMA          Induction: intravenous    Postoperative Plan: Postoperative administration of opioids is intended.    Pertinent diagnostic labs and testing reviewed    Informed Consent:    Anesthetic plan and risks discussed with patient.    Use of blood products discussed with: patient whom consented to blood products.

## 2022-07-17 NOTE — PROGRESS NOTES
[facility] Medicine Daily Progress Note    Date of Service  7/17/2022    Chief Complaint  April Alicia is a 62 y.o. female admitted 7/10/2022 with hypotension, AMS    Hospital Course  63yo PMHx coccaine and ETOH abuse though sober x 8 years per her report, cirrhosis, asthma, AFib on apixaban, DM, HTN. Took an unknown narcotic.  In ED responded to narcan, labs showing Na 121, Cl 89, Creat 5.35, CXR showing cardiomegally.  Admitted to the floor with AMS, HypoNa, dehydration, sepsis from urinary source.  Admitted to the floor.  Later on day of admission transferred to IMCU for hypotension.  Subsequent blood cultures + Strep Species    Bacteremia with group B -on Rocephin, repeated blood culture NTD. TTE showed a EF of 60%, negative for vegetation.     ELIZABETH-resolved  Iron deficiency anemia -holding IV iron replacement given ongoing infection    Interval Problem Update  Patient reported  left upper arm, legs, back and abdomen pain.  Reported nausea vomiting, diarrhea.  C. difficile negative.  Imodium as needed.    Left knee and left shoulder pain/swelling -s/p drainage per Ortho 7/16 -fluid culture pending    MRI left shoulder showed possible septic arthritis/bursitis  Ortho consulted, Plan for left shoulder incision and drainage.  May consider surgical management of left septic knee     ID consulted, continue Rocephin for group B bacteremia, pending culture of synovial fluid  on 7/16.     Likely need SNF, ordered    I have discussed this patient's plan of care and discharge plan at IDT rounds today with Case Management, Nursing, Nursing leadership, and other members of the IDT team.    Consultants/Specialty      Code Status  Full Code    Disposition  Patient is not medically cleared for discharge.   Anticipate discharge to to home with close outpatient follow-up.  I have placed the appropriate orders for post-discharge needs.    Review of Systems  Review of Systems   Unable to perform ROS: Mental status change    Constitutional: Positive for malaise/fatigue. Negative for chills and fever.   HENT: Negative for nosebleeds and sore throat.    Eyes: Negative for blurred vision and double vision.   Respiratory: Negative for cough and shortness of breath.    Cardiovascular: Negative for chest pain, palpitations and leg swelling.   Gastrointestinal: Positive for abdominal pain. Negative for diarrhea, nausea and vomiting.   Genitourinary: Negative for dysuria.   Musculoskeletal: Positive for back pain, joint pain and myalgias.        L arm pain; left knee pain and swelling    Skin: Negative for rash.   Neurological: Negative for dizziness, loss of consciousness and headaches.        Physical Exam  Temp:  [36.1 °C (97 °F)-37.7 °C (99.9 °F)] 36.8 °C (98.2 °F)  Pulse:  [] 78  Resp:  [16-18] 18  BP: (114-134)/(67-75) 121/67  SpO2:  [93 %-96 %] 93 %    Physical Exam  Vitals reviewed.   Constitutional:       General: She is not in acute distress.     Appearance: She is well-developed. She is ill-appearing. She is not diaphoretic.   HENT:      Head: Normocephalic and atraumatic.      Mouth/Throat:      Mouth: Mucous membranes are moist.   Eyes:      Extraocular Movements: Extraocular movements intact.      Conjunctiva/sclera: Conjunctivae normal.   Neck:      Vascular: No JVD.   Cardiovascular:      Rate and Rhythm: Normal rate and regular rhythm.      Pulses: Normal pulses.   Pulmonary:      Effort: Pulmonary effort is normal. No respiratory distress.      Breath sounds: No stridor. No wheezing or rales.   Abdominal:      Palpations: Abdomen is soft.      Tenderness: There is no abdominal tenderness. There is no guarding or rebound.   Musculoskeletal:         General: Swelling and tenderness present.      Cervical back: Neck supple. No rigidity or tenderness.      Right lower leg: No edema.      Left lower leg: No edema.      Comments: Left knee swelling, tenderness, no erythema  edema L arm   Skin:     General: Skin is warm and  dry.      Comments: BL LE skin graft with pigmentation.  No erythema or swelling.    Neurological:      Mental Status: She is alert and oriented to person, place, and time. Mental status is at baseline.   Psychiatric:      Comments: Anxious         Fluids    Intake/Output Summary (Last 24 hours) at 7/17/2022 1257  Last data filed at 7/17/2022 1200  Gross per 24 hour   Intake 360 ml   Output 2150 ml   Net -1790 ml       Laboratory  Recent Labs     07/15/22  0216 07/16/22  0427 07/17/22  0140   WBC 9.6 10.8 10.7   RBC 3.83* 3.88* 3.90*   HEMOGLOBIN 11.0* 10.9* 11.1*   HEMATOCRIT 33.9* 32.7* 33.7*   MCV 88.5 84.3 86.4   MCH 28.7 28.1 28.5   MCHC 32.4* 33.3* 32.9*   RDW 47.6 45.1 46.5   PLATELETCT 119* 114* 126*   MPV 10.1 9.0 9.1     Recent Labs     07/15/22  0216 07/16/22  0427 07/17/22  0140   SODIUM 135 133* 132*   POTASSIUM 3.3* 3.9 4.4   CHLORIDE 106 106 107   CO2 19* 19* 18*   GLUCOSE 124* 128* 90   BUN 18 27* 25*   CREATININE 0.47* 0.75 0.68   CALCIUM 7.9* 7.6* 7.8*                   Imaging  MR-SHOULDER-W/O LEFT   Final Result      1.  Large joint effusion with a large amount of fluid seen extending into the subacromial/subdeltoid bursa with synovitis and small low signal foci within the bursa consistent with pockets of gas. These findings are very suspicious for septic arthritis    and septic bursitis.      2.  Complete full-thickness tear of the supraspinatus tendon with retraction to the bony glenoid.      3.  Full-thickness tear portion of the subscapularis tendon.      4.  Partial-thickness tear of the articular surface fibers of the infraspinatus tendon.      5.  Prominent muscle edema and atrophy involving the rotator cuff as well as all the muscles surrounding the shoulder which may represent myositis.      6.  Osteoarthritis of the glenohumeral joint.      7.  Diffuse tearing of the glenoid labrum.      8.  Subluxation of the long head of the biceps tendon medially from the bicipital groove with marked  thinning of the intra-articular portion consistent with tendinosis.      9.  Extensive soft tissue edema to include fat and muscle involving all of the imaged structures around the shoulder likely representing cellulitis.      10.  Widening of the acromioclavicular joint with fragmentation of the distal clavicle consistent with old posttraumatic change versus surgical change.      DL-ZIREYHC-4 VIEW   Final Result      Nonobstructive bowel gas pattern.      EC-ECHOCARDIOGRAM COMPLETE W/O CONT   Final Result      DX-CHEST-PORTABLE (1 VIEW)   Final Result      1.  Interstitial infiltrates versus edema.   2.  Cardiomegaly.      DX-KNEE 2- RIGHT   Final Result      1.  Negative for fracture or malalignment      2.  Right knee arthroplasty appears within normal limits      DX-KNEE 2- LEFT   Final Result      1.  New lucency adjacent to the tibial component of arthroplasty suspicious for loosening or infection      2.  No other finding      US-EXTREMITY VENOUS UPPER UNILAT LEFT   Final Result      US-RENAL   Final Result      1.  Unremarkable kidneys.   2.  Limited evaluation of the bladder.      CT-ABDOMEN-PELVIS W/O   Final Result         Limited exam due to lack of IV contrast.         1. Mild diffuse wall thickening of the urinary bladder could relate to infection or inflammation. Correlate with UA. No hydronephrosis.   2. Nonspecific mildly prominent fluid-filled proximal small bowel in the left abdomen. This could be seen with enteritis, focal ileus or early obstruction.   3. Cirrhotic liver with portal hypertension and splenomegaly. Wall thickening of the ascending colon could relate to portal hypertension.   4. No ascites.      DX-SHOULDER 2+ LEFT   Final Result      1.  No evidence of acute fracture or dislocation.      2.  Old posttraumatic changes of the distal clavicle.      DX-CHEST-PORTABLE (1 VIEW)   Final Result      Cardiomegaly.           Assessment/Plan  * Bacteremia  Assessment & Plan  2 bottles with  group B strep await sensitivity and further specification  Patient reported history of severe bacteremia a year ago after knee surgery last year, which required skin grafting of both lower extremity and was hospitalized at [place] when she visited her son there    Cont Rocephin  Check TTE  Repeat blood cultures  Consulted ID      Left knee pain  Assessment & Plan  History of left knee replacement 2017, which was complicated with infection 2 weeks later  Patient had recurrent left knee infection last year which was complicated with bacteremia and she was hospitalized at the [place] hospital when she visited her son     She was not able to walk on her left knee because of pain  -x-ray showed new fluid retention, suspected infection    Ortho consulted,   MRI knee camcelled per Ortho  S/p fluid drainage per Ortho 7/16   fluid culture pending  May consider surgical management of left septic knee     On Rocephin, ID following    Hyponatremia  Assessment & Plan  123 on admission  High SG on UA: probable hypovolemic and alcohol use  Cont to monitor    Resolved    Acute cystitis without hematuria  Assessment & Plan  On Rocephin for bacteremia    Septic arthritis of shoulder, left (HCC)  Assessment & Plan  Left shoulder pain and swelling  Ultrasound negative for DVT  MRI left shoulder showed possible septic arthritis/bursitis    Ortho consulted,   S/p fluid drainage per Ortho 7/16   Plan for left shoulder I/D 7/17    fluid culture pending  On Rocephin, ID following    Iron deficiency anemia  Assessment & Plan  Iron study consistent with iron deficiency  Hgb 10.7 at admission, stable    holding IV iron replacement given ongoing infection    Metabolic encephalopathy  Assessment & Plan  Likely multifactorial in conjunction with narcotic use  S/p Narcan treatment, improved  Start lacutlose  Cont to treat infection     resolved      Hyperlipidemia- (present on admission)  Assessment & Plan  Continue Lipitor    ELIZABETH (acute kidney  injury) (HCC)- (present on admission)  Assessment & Plan  Likely secondary dehydration  4.7 on admission   Good UOP  DC IVFs  Cont to monitor  Renally dose medications as appropriate    Resolved    Atrial fibrillation (HCC)- (present on admission)  Assessment & Plan  On apixaban and dronedarone as outpt    Cirrhosis (HCC)- (present on admission)  Assessment & Plan  Secondary to ETOH  Lactulose  monitor    Hypertension- (present on admission)  Assessment & Plan  Resume the home losartan    Controlled type 2 diabetes mellitus with hyperglycemia, without long-term current use of insulin (HCC)- (present on admission)  Assessment & Plan  Sliding-scale insulin, diabetic diet  Running mid to high 100s  Send A1c  On metformin  As outpt       VTE prophylaxis: enoxaparin ppx    I have performed a physical exam and reviewed and updated ROS and Plan today (7/17/2022). In review of yesterday's note (7/16/2022), there are no changes except as documented above.

## 2022-07-17 NOTE — ANESTHESIA POSTPROCEDURE EVALUATION
Patient: April Alicia    Procedure Summary     Date: 07/17/22 Room / Location: [place] [address] / SURGERY [facility]    Anesthesia Start: 1315 Anesthesia Stop: 1438    Procedure: IRRIGATION AND DEBRIDEMENT, SHOULDER (Left Shoulder) Diagnosis: (left shoulder septic arthritis )    Surgeons: Obdulio Gray M.D. Responsible Provider: Lian Washington M.D.    Anesthesia Type: general ASA Status: 3 - Emergent          Final Anesthesia Type: general  Last vitals  BP   Blood Pressure: 121/67    Temp   36.8 °C (98.2 °F)    Pulse   78   Resp   18    SpO2   93 %      Anesthesia Post Evaluation    Patient location during evaluation: PACU  Patient participation: complete - patient participated  Level of consciousness: awake and alert    Airway patency: patent  Anesthetic complications: no  Cardiovascular status: hemodynamically stable  Respiratory status: acceptable  Hydration status: euvolemic    PONV: none          No complications documented.     Nurse Pain Score: 9 (NPRS)

## 2022-07-18 LAB
ANION GAP SERPL CALC-SCNC: 8 MMOL/L (ref 7–16)
BACTERIA BLD CULT: NORMAL
BACTERIA BLD CULT: NORMAL
BUN SERPL-MCNC: 30 MG/DL (ref 8–22)
CALCIUM SERPL-MCNC: 7.2 MG/DL (ref 8.5–10.5)
CHLORIDE SERPL-SCNC: 107 MMOL/L (ref 96–112)
CO2 SERPL-SCNC: 18 MMOL/L (ref 20–33)
CREAT SERPL-MCNC: 0.92 MG/DL (ref 0.5–1.4)
ERYTHROCYTE [DISTWIDTH] IN BLOOD BY AUTOMATED COUNT: 46.5 FL (ref 35.9–50)
FUNGUS SPEC FUNGUS STN: NORMAL
FUNGUS SPEC FUNGUS STN: NORMAL
GFR SERPLBLD CREATININE-BSD FMLA CKD-EPI: 70 ML/MIN/1.73 M 2
GLUCOSE BLD STRIP.AUTO-MCNC: 228 MG/DL (ref 65–99)
GLUCOSE BLD STRIP.AUTO-MCNC: 293 MG/DL (ref 65–99)
GLUCOSE BLD STRIP.AUTO-MCNC: 305 MG/DL (ref 65–99)
GLUCOSE BLD STRIP.AUTO-MCNC: 323 MG/DL (ref 65–99)
GLUCOSE SERPL-MCNC: 294 MG/DL (ref 65–99)
HCT VFR BLD AUTO: 27.7 % (ref 37–47)
HGB BLD-MCNC: 9 G/DL (ref 12–16)
INR PPP: 1.49 (ref 0.87–1.13)
MCH RBC QN AUTO: 28.2 PG (ref 27–33)
MCHC RBC AUTO-ENTMCNC: 32.5 G/DL (ref 33.6–35)
MCV RBC AUTO: 86.8 FL (ref 81.4–97.8)
PLATELET # BLD AUTO: 108 K/UL (ref 164–446)
PMV BLD AUTO: 10.1 FL (ref 9–12.9)
POTASSIUM SERPL-SCNC: 4.7 MMOL/L (ref 3.6–5.5)
PROTHROMBIN TIME: 17.7 SEC (ref 12–14.6)
RBC # BLD AUTO: 3.19 M/UL (ref 4.2–5.4)
RHODAMINE-AURAMINE STN SPEC: NORMAL
SIGNIFICANT IND 70042: NORMAL
SITE SITE: NORMAL
SODIUM SERPL-SCNC: 133 MMOL/L (ref 135–145)
SOURCE SOURCE: NORMAL
WBC # BLD AUTO: 10.8 K/UL (ref 4.8–10.8)

## 2022-07-18 PROCEDURE — 700101 HCHG RX REV CODE 250

## 2022-07-18 PROCEDURE — 82962 GLUCOSE BLOOD TEST: CPT | Mod: 91

## 2022-07-18 PROCEDURE — 700102 HCHG RX REV CODE 250 W/ 637 OVERRIDE(OP): Performed by: HOSPITALIST

## 2022-07-18 PROCEDURE — 700102 HCHG RX REV CODE 250 W/ 637 OVERRIDE(OP): Performed by: STUDENT IN AN ORGANIZED HEALTH CARE EDUCATION/TRAINING PROGRAM

## 2022-07-18 PROCEDURE — 99232 SBSQ HOSP IP/OBS MODERATE 35: CPT | Performed by: STUDENT IN AN ORGANIZED HEALTH CARE EDUCATION/TRAINING PROGRAM

## 2022-07-18 PROCEDURE — 85027 COMPLETE CBC AUTOMATED: CPT

## 2022-07-18 PROCEDURE — 770001 HCHG ROOM/CARE - MED/SURG/GYN PRIV*

## 2022-07-18 PROCEDURE — A9270 NON-COVERED ITEM OR SERVICE: HCPCS | Performed by: HOSPITALIST

## 2022-07-18 PROCEDURE — 36415 COLL VENOUS BLD VENIPUNCTURE: CPT

## 2022-07-18 PROCEDURE — A9270 NON-COVERED ITEM OR SERVICE: HCPCS | Performed by: STUDENT IN AN ORGANIZED HEALTH CARE EDUCATION/TRAINING PROGRAM

## 2022-07-18 PROCEDURE — 85610 PROTHROMBIN TIME: CPT

## 2022-07-18 PROCEDURE — 700111 HCHG RX REV CODE 636 W/ 250 OVERRIDE (IP): Performed by: INTERNAL MEDICINE

## 2022-07-18 PROCEDURE — 97530 THERAPEUTIC ACTIVITIES: CPT

## 2022-07-18 PROCEDURE — 99233 SBSQ HOSP IP/OBS HIGH 50: CPT | Performed by: INTERNAL MEDICINE

## 2022-07-18 PROCEDURE — 80048 BASIC METABOLIC PNL TOTAL CA: CPT

## 2022-07-18 RX ORDER — LOSARTAN POTASSIUM 50 MG/1
25 TABLET ORAL DAILY
Status: DISCONTINUED | OUTPATIENT
Start: 2022-07-19 | End: 2022-07-29

## 2022-07-18 RX ADMIN — INSULIN HUMAN 12 UNITS: 100 INJECTION, SOLUTION PARENTERAL at 18:20

## 2022-07-18 RX ADMIN — OXYCODONE 5 MG: 5 TABLET ORAL at 22:19

## 2022-07-18 RX ADMIN — LACTULOSE 15 ML: 20 SOLUTION ORAL at 05:46

## 2022-07-18 RX ADMIN — LIDOCAINE 1 PATCH: 700 PATCH TOPICAL at 03:56

## 2022-07-18 RX ADMIN — INSULIN HUMAN 9 UNITS: 100 INJECTION, SOLUTION PARENTERAL at 13:20

## 2022-07-18 RX ADMIN — DRONEDARONE 400 MG: 400 TABLET, FILM COATED ORAL at 08:24

## 2022-07-18 RX ADMIN — OXYCODONE 5 MG: 5 TABLET ORAL at 13:27

## 2022-07-18 RX ADMIN — INSULIN HUMAN 12 UNITS: 100 INJECTION, SOLUTION PARENTERAL at 20:54

## 2022-07-18 RX ADMIN — OXYCODONE 5 MG: 5 TABLET ORAL at 09:26

## 2022-07-18 RX ADMIN — OXYCODONE 5 MG: 5 TABLET ORAL at 03:55

## 2022-07-18 RX ADMIN — CEFTRIAXONE SODIUM 2 G: 10 INJECTION, POWDER, FOR SOLUTION INTRAVENOUS at 18:19

## 2022-07-18 RX ADMIN — OXYCODONE 5 MG: 5 TABLET ORAL at 18:19

## 2022-07-18 RX ADMIN — LOSARTAN POTASSIUM 100 MG: 50 TABLET, FILM COATED ORAL at 05:46

## 2022-07-18 RX ADMIN — ATORVASTATIN CALCIUM 20 MG: 20 TABLET, FILM COATED ORAL at 20:54

## 2022-07-18 RX ADMIN — DRONEDARONE 400 MG: 400 TABLET, FILM COATED ORAL at 18:19

## 2022-07-18 ASSESSMENT — ENCOUNTER SYMPTOMS
FEVER: 0
PALPITATIONS: 0
SORE THROAT: 0
DOUBLE VISION: 0
CONSTIPATION: 0
COUGH: 0
VOMITING: 0
LOSS OF CONSCIOUSNESS: 0
SHORTNESS OF BREATH: 0
ABDOMINAL PAIN: 0
NAUSEA: 0
NERVOUS/ANXIOUS: 0
DIZZINESS: 0
BLURRED VISION: 0
ABDOMINAL PAIN: 1
HEADACHES: 0
BACK PAIN: 1
CHILLS: 0
MYALGIAS: 1
DIARRHEA: 0

## 2022-07-18 ASSESSMENT — PAIN DESCRIPTION - PAIN TYPE
TYPE: ACUTE PAIN

## 2022-07-18 ASSESSMENT — COGNITIVE AND FUNCTIONAL STATUS - GENERAL
MOVING FROM LYING ON BACK TO SITTING ON SIDE OF FLAT BED: UNABLE
MOVING TO AND FROM BED TO CHAIR: UNABLE
MOBILITY SCORE: 9
CLIMB 3 TO 5 STEPS WITH RAILING: TOTAL
STANDING UP FROM CHAIR USING ARMS: A LITTLE
WALKING IN HOSPITAL ROOM: A LOT
TURNING FROM BACK TO SIDE WHILE IN FLAT BAD: UNABLE
SUGGESTED CMS G CODE MODIFIER MOBILITY: CM

## 2022-07-18 ASSESSMENT — PATIENT HEALTH QUESTIONNAIRE - PHQ9
2. FEELING DOWN, DEPRESSED, IRRITABLE, OR HOPELESS: NOT AT ALL
1. LITTLE INTEREST OR PLEASURE IN DOING THINGS: NOT AT ALL
SUM OF ALL RESPONSES TO PHQ9 QUESTIONS 1 AND 2: 0

## 2022-07-18 ASSESSMENT — GAIT ASSESSMENTS: GAIT LEVEL OF ASSIST: UNABLE TO PARTICIPATE

## 2022-07-18 NOTE — CONSULTS
CC: Septic left total knee arthroplasty    HPI:   April Alicia is a 62-year-old female who has history of alcohol abuse with cirrhosis, asthma, A. fib on apixaban, diabetes type 2, hypertension who was admitted with urosepsis as well as septic shoulder now status post irrigation and debridement and newly diagnosed septic left total knee arthroplasty.  She was admitted with urosepsis and found to have positive blood cultures with strep species started on antibiotics.  She was found to have a very painful shoulder which was treated with irrigation and debridement in the operating room.  She was then noted to have left knee pain and her left knee was aspirated.  It demonstrated 103,000 cells with 72% polys.    Her history is very complicated with respect to her left knee.  She underwent primary total knee arthroplasty in August 2020 with Dr. Faustin.  Immediately following surgery, she did well.  Unfortunately, the following summer in August 2021 she was admitted in [place] for bacteremia with sepsis as well as bilateral lower extremity cellulitis with skin breakdown and concurrent left knee acute infection.  She underwent irrigation and debridement of her knee with polyethylene exchange as well as irrigation and debridement of her lower extremities with subsequent as skin graft.  At that time, she was treated with IV antibiotics.  It is unknown what bacteria and what antibiotic she had at that time.  She reports that she got better following that incident and was doing relatively well for the past 5 months.  She has not followed up with Dr. Faustin in that time.    ROS: Positive for musculoskeletal pain and what is stated in the HPI and PMH, otherwise negative review of systems    PMH:   Past Medical History:   Diagnosis Date   • Arrhythmia    • Arthritis     OA in knees   • ASTHMA    • Blood clotting disorder (HCC) 2018    right leg blood clot   • Dental disorder     upper and lower dentures   • Diabetes     • Emphysema of lung (HCC)    • Heart abnormality     leakage in left valve   • Heart burn     left knee   • Heart valve disease    • Hypertension    • Infection 9/4/2017   • Infection 2018    Right knee after total knee   • Liver disease    • Pneumonia 02/2020   • Seizure disorder (HCC)        PSH:   Past Surgical History:   Procedure Laterality Date   • IRRIGATION & DEBRIDEMENT GENERAL Left 7/17/2022    Procedure: IRRIGATION AND DEBRIDEMENT, SHOULDER;  Surgeon: Obdulio Gray M.D.;  Location: SURGERY [facility];  Service: Orthopedics   • PB TOTAL KNEE ARTHROPLASTY Left 8/24/2020    Procedure: ARTHROPLASTY, KNEE, TOTAL;  Surgeon: Víctor Faustin M.D.;  Location: SURGERY [facility];  Service: Orthopedics   • KNEE ARTHROPLASTY TOTAL Right 2018   • IRRIGATION & DEBRIDEMENT ORTHO Right 9/3/2017    Procedure: IRRIGATION & DEBRIDEMENT ORTHO, POLY EXCHANGE;  Surgeon: Jerome Russell M.D.;  Location: SURGERY [facility];  Service: Orthopedics   • COLONOSCOPY WITH CLIPPING  10/28/2015    Procedure: COLONOSCOPY WITH CLIPPING;  Surgeon: Ruben Colon M.D.;  Location: ENDOSCOPY [facility];  Service:    • COLONOSCOPY WITH SCLEROTHERAPY  10/28/2015    Procedure: COLONOSCOPY WITH SCLEROTHERAPY;  Surgeon: Ruben Colon M.D.;  Location: ENDOSCOPY [facility];  Service:    • COLONOSCOPY WITH TATTOOING  10/28/2015    Procedure: COLONOSCOPY WITH TATTOOING;  Surgeon: Ruben Colon M.D.;  Location: ENDOSCOPY [facility];  Service:    • GYN SURGERY  2003    hysteroscoopy   • GYN SURGERY  1982    tubal ligation       Meds:   Medications Prior to Admission   Medication Sig Dispense Refill Last Dose   • docusate sodium 100 MG Cap Take 100 mg by mouth 2 times a day.   7/9/2022 at 0830   • furosemide (LASIX) 20 MG Tab Take 1 Tablet by mouth every day. 90 Tablet 3 7/9/2022 at 0830   • potassium Chloride ER (K-TAB) 20 MEQ Tab CR tablet Take 1 Tablet by mouth every day. 90 Tablet 3 7/9/2022 at 0830   •  atorvastatin (LIPITOR) 20 MG Tab Take 1 Tablet by mouth every evening. 90 Tablet 3 2022 at 1930   • pantoprazole (PROTONIX) 40 MG Tablet Delayed Response Take 40 mg by mouth every day.   2022 at 0830   • MULTAQ 400 MG Tab Take 400 mg by mouth 2 times a day, with meals.   2022 at 0830   • felodipine (PLENDIL) 5 MG TABLET SR 24 HR Take 5 mg by mouth every evening.   2022 at 1930   • gabapentin (NEURONTIN) 400 MG Cap Take 400 mg by mouth 2 times a day.   2022 at 0830   • metFORMIN ER (GLUCOPHAGE XR) 500 MG TABLET SR 24 HR Take 500 mg by mouth every evening.   2022 at 1930   • apixaban (ELIQUIS) 5mg Tab Take 5 mg by mouth 2 Times a Day.   2022 at 0830   • losartan (COZAAR) 100 MG Tab Take 100 mg by mouth every day.   2022 at 0830   • albuterol (VENTOLIN OR PROVENTIL) 108 (90 BASE) MCG/ACT Aero Soln inhalation aerosol Inhale 2 Puffs every four hours as needed for Shortness of Breath.   2022 at 0830       Allergies:   Allergies   Allergen Reactions   • Nkda [No Known Drug Allergy]        SH:   Social History     Socioeconomic History   • Marital status: Single     Spouse name: Not on file   • Number of children: Not on file   • Years of education: Not on file   • Highest education level: Not on file   Occupational History   • Not on file   Tobacco Use   • Smoking status: Former Smoker     Types: Cigarettes     Quit date: 2016     Years since quittin.5   • Smokeless tobacco: Former User     Quit date: 2021   • Tobacco comment: a few a day when I can   Vaping Use   • Vaping Use: Never used   Substance and Sexual Activity   • Alcohol use: No   • Drug use: No   • Sexual activity: Not on file   Other Topics Concern   • Not on file   Social History Narrative   • Not on file     Social Determinants of Health     Financial Resource Strain: Not on file   Food Insecurity: Not on file   Transportation Needs: Not on file   Physical Activity: Not on file   Stress: Not on file   Social  Connections: Not on file   Intimate Partner Violence: Not on file   Housing Stability: Not on file       FH:   History reviewed. No pertinent family history.    Physical Exam:   General: AO x4  Eyes: non-icteric  Breathing: nonlabored  MSK:   Evaluation of the right knee demonstrates significant pain with any range of motion of the knee.  She is able to tolerate 15 to 30 degrees of motion of the knee.  She has 3+ effusion with erythema over the anterior aspect of her knee.  She is able to demonstrate EHL, FHL, tib and gastrocsoleus.  She has grossly intact to sensation in L2-S1 with diminished sensation up to the level of the knees which is her baseline.  She has a palpable pulse in her DP.    She has no pain with range of motion of the contralateral knee ankle or hip.  She does have significant range of motion pain with her left shoulder    Imaging:   X-rays including AP and lateral of the left knee demonstrate concern for loosening of the tibial component with Turpin [employer] Nephew, Kymberly components in place    Assessment/Plan:   April Murcia is a 62-year-old female with bacteremia status post left shoulder irrigation and debridement and now with recently diagnosed infection in her left total knee arthroplasty.  Given her complex history with prior history of I&D and polyexchange of that knee, continued pain and lucency about the tibial component, I am concerned for chronic infection.  I discussed with the patient chronic infection versus acute infection.  If this were an acute infection she may be a candidate for irrigation and debridement and polyexchange.  However given the concerns we have for chronic infection, she is a candidate for irrigation and debridement and resection with antibiotic spacer.  I discussed with her that given her prior failure and comorbidities as well as lab findings she has a less than 50% chance of success of irrigation and debridement and polyexchange.  She certainly still has a  risk of failure of antibiotic spacer, but higher chances of eradication of her infection, particularly with her continued bacteremia.  Patient understood the options and wished to proceed with resection and antibiotic spacer. We discussed the risks of surgery including recurrent infection, fracture, pain, nerve damage, need for repeat surgery, and risks of anesthesia including stroke, heart attack and death and she wished to proceed with surgery.   1. Consent for L knee resection and antibiotic spacer  2. NPO midnight for OR tomorrow      Wild Luz M.D.

## 2022-07-18 NOTE — PROGRESS NOTES
"   Orthopaedic Progress Note    Interval changes:  Patient doing well  SONY in place left shoulder 75cc over last 24 hours- in place until output is 0 for 48 hours  To OR tomorrow for prosthetic removal and abx spacer placement  NPO after midnight  LUE dressing CDI, DNVI    ROS - Patient denies any new issues.  Pain well controlled.    /58   Pulse 73   Temp 36.2 °C (97.2 °F) (Temporal)   Resp 16   Ht 1.715 m (5' 7.5\")   Wt 85.7 kg (188 lb 15 oz)   SpO2 97%       Patient seen and examined  No acute distress  Breathing non labored  RRR  LUE dressing CDI, DNVI, moves all fingers, cap refill <2 sec.      Recent Labs     07/16/22  0427 07/17/22  0140 07/18/22  0325   WBC 10.8 10.7 10.8   RBC 3.88* 3.90* 3.19*   HEMOGLOBIN 10.9* 11.1* 9.0*   HEMATOCRIT 32.7* 33.7* 27.7*   MCV 84.3 86.4 86.8   MCH 28.1 28.5 28.2   MCHC 33.3* 32.9* 32.5*   RDW 45.1 46.5 46.5   PLATELETCT 114* 126* 108*   MPV 9.0 9.1 10.1       Active Hospital Problems    Diagnosis    • Hypertension [I10]      Priority: Medium   • Controlled type 2 diabetes mellitus with hyperglycemia, without long-term current use of insulin (formerly [facility]) [E11.65]      Priority: Medium   • Cirrhosis (HCC) [K74.60]      Priority: Low   • Septic arthritis of shoulder, left (HCC) [M00.9]    • Left knee pain [M25.562]    • Iron deficiency anemia [D50.9]    • Acute cystitis without hematuria [N30.00]    • Bacteremia [R78.81]    • Hyponatremia [E87.1]    • ELIZABETH (acute kidney injury) (formerly [facility]) [N17.9]    • Hyperlipidemia [E78.5]    • Metabolic encephalopathy [G93.41]    • Atrial fibrillation (formerly [facility]) [I48.91]        Assessment/Plan:  Patient doing well  SONY in place left shoulder 75cc over last 24 hours- in place until output is 0 for 48 hours  To OR tomorrow for prosthetic removal and abx spacer placement  NPO after midnight  LUE dressing CDI, DNVI  POD#1 S/P   1.  Left shoulder arthrotomy and lavage for infection with partial synovectomy.  2.  Left shoulder incision and drainage of the " subdeltoid abscess.  Wt bearing status - WBAT  Wound care/Drains - Dressings to be changed every other day by nursing  Future Procedures - none planned   Sutures/Staples out- 14 days post operatively  PT/OT-initiated  Antibiotics: rocephin 2g IV qd     DVT Prophylaxis- TEDS/SCDs/Foot pumps  Amaro-none  Case Coordination for Discharge Planning - Disposition pending abx needs

## 2022-07-18 NOTE — CARE PLAN
The patient is Stable - Low risk of patient condition declining or worsening    Shift Goals: Pain control, improve mobility, antibiotics   Clinical Goals: Improve mobility, infection control and prevention, pain control  Patient Goals: Rest, improve mobility, go home with home health care  Family Goals: EDMUND    Progress made toward(s) clinical / shift goals:  see above    Patient is not progressing towards the following goals:

## 2022-07-18 NOTE — CARE PLAN
The patient is Stable - Low risk of patient condition declining or worsening    Shift Goals  Clinical Goals: Improve mobility, infection control and prevention, pain control  Patient Goals: Rest, improve mobility, go home with home health care  Family Goals: EDMUND    Progress made toward(s) clinical / shift goals:  Patient progressing slowly this shift. Has I&D L shoulder yesterday (post op day 1). SONY drain 50cc out last night. A&O x 4. Skin intact, has some healed grafts to bilateral shins. Tolerating regular diet - consider switch to consistent carbs as patient is diabetic. Continuing with room air O2. Not moving well, max assist to get up and does not tolerate more than a few steps. Very limited range of motion to left arm. Glucose ACHS. Discharge plan is home once cleared.      Problem: Knowledge Deficit - Standard  Goal: Patient and family/care givers will demonstrate understanding of plan of care, disease process/condition, diagnostic tests and medications  Outcome: Progressing     Problem: Pain - Standard  Goal: Alleviation of pain or a reduction in pain to the patient’s comfort goal  Outcome: Progressing     Problem: Skin Integrity  Goal: Skin integrity is maintained or improved  Outcome: Progressing     Problem: Fall Risk  Goal: Patient will remain free from falls  Outcome: Progressing

## 2022-07-18 NOTE — PROGRESS NOTES
[facility] Medicine Daily Progress Note    Date of Service  7/18/2022    Chief Complaint  April Alicia is a 62 y.o. female admitted 7/10/2022 with hypotension, AMS    Hospital Course  61yo PMHx coccaine and ETOH abuse though sober x 8 years per her report, cirrhosis, asthma, AFib on apixaban, DM, HTN. Took an unknown narcotic.  In ED responded to narcan, labs showing Na 121, Cl 89, Creat 5.35, CXR showing cardiomegally.  Admitted to the floor with AMS, HypoNa, dehydration, sepsis from urinary source.  Admitted to the floor.  Later on day of admission transferred to IMCU for hypotension.  Subsequent blood cultures + Strep Species    Bacteremia with group B -on Rocephin, repeated blood culture NTD. TTE showed  EF of 60%, negative for vegetation.     ELIZABETH-resolved  Iron deficiency anemia -holding IV iron replacement given ongoing infection  MRI left shoulder showed possible septic arthritis/bursitis  Septic left shoulder  -s/p drainage per Ortho 7/16 -fluid culture pending    Interval Problem Update  Patient feels better today, sitting on chair comfortably.  Left shoulder and knee pain are better.    Septic left shoulder  -s/p drainage per Ortho 7/16 -fluid culture pending, SONY drain in place  fluid culture pending, NTD    Septic left knee -Ortho plan to  have further surgical intervention tomorrow    BP in the lower range, decreased losartan to 25 mg    ID consulted, continue Rocephin for group B bacteremia, pending culture of synovial fluid  on 7/16.     Likely need SNF, ordered    I have discussed this patient's plan of care and discharge plan at IDT rounds today with Case Management, Nursing, Nursing leadership, and other members of the IDT team.    Consultants/Specialty      Code Status  Full Code    Disposition  Patient is not medically cleared for discharge.   Anticipate discharge to to home with close outpatient follow-up.  I have placed the appropriate orders for post-discharge needs.    Review of  Systems  Review of Systems   Unable to perform ROS: Mental status change   Constitutional: Positive for malaise/fatigue. Negative for chills and fever.   HENT: Negative for nosebleeds and sore throat.    Eyes: Negative for blurred vision and double vision.   Respiratory: Negative for cough and shortness of breath.    Cardiovascular: Negative for chest pain, palpitations and leg swelling.   Gastrointestinal: Positive for abdominal pain. Negative for diarrhea, nausea and vomiting.   Genitourinary: Negative for dysuria.   Musculoskeletal: Positive for back pain, joint pain and myalgias.        L arm pain; left knee pain and swelling    Skin: Negative for rash.   Neurological: Negative for dizziness, loss of consciousness and headaches.        Physical Exam  Temp:  [36.1 °C (96.9 °F)-36.9 °C (98.4 °F)] 36.2 °C (97.2 °F)  Pulse:  [] 73  Resp:  [15-19] 16  BP: ()/(50-77) 107/58  SpO2:  [91 %-99 %] 97 %    Physical Exam  Vitals reviewed.   Constitutional:       General: She is not in acute distress.     Appearance: She is well-developed. She is ill-appearing. She is not diaphoretic.   HENT:      Head: Normocephalic and atraumatic.      Mouth/Throat:      Mouth: Mucous membranes are moist.   Eyes:      Extraocular Movements: Extraocular movements intact.      Conjunctiva/sclera: Conjunctivae normal.   Neck:      Vascular: No JVD.   Cardiovascular:      Rate and Rhythm: Normal rate and regular rhythm.      Pulses: Normal pulses.   Pulmonary:      Effort: Pulmonary effort is normal. No respiratory distress.      Breath sounds: No stridor. No wheezing or rales.   Abdominal:      Palpations: Abdomen is soft.      Tenderness: There is no abdominal tenderness. There is no guarding or rebound.   Musculoskeletal:         General: Swelling and tenderness present.      Cervical back: Neck supple. No rigidity or tenderness.      Right lower leg: No edema.      Left lower leg: No edema.      Comments: Left knee swelling,  tenderness  Left shoulder clean dressing, SONY drain in place   Skin:     General: Skin is warm and dry.      Comments: BL LE skin graft with pigmentation.  No erythema or swelling.    Neurological:      Mental Status: She is alert and oriented to person, place, and time. Mental status is at baseline.   Psychiatric:      Comments: Anxious         Fluids    Intake/Output Summary (Last 24 hours) at 7/18/2022 1237  Last data filed at 7/18/2022 0558  Gross per 24 hour   Intake 1340 ml   Output 625 ml   Net 715 ml       Laboratory  Recent Labs     07/16/22 0427 07/17/22  0140 07/18/22  0325   WBC 10.8 10.7 10.8   RBC 3.88* 3.90* 3.19*   HEMOGLOBIN 10.9* 11.1* 9.0*   HEMATOCRIT 32.7* 33.7* 27.7*   MCV 84.3 86.4 86.8   MCH 28.1 28.5 28.2   MCHC 33.3* 32.9* 32.5*   RDW 45.1 46.5 46.5   PLATELETCT 114* 126* 108*   MPV 9.0 9.1 10.1     Recent Labs     07/16/22 0427 07/17/22  0140 07/18/22  0325   SODIUM 133* 132* 133*   POTASSIUM 3.9 4.4 4.7   CHLORIDE 106 107 107   CO2 19* 18* 18*   GLUCOSE 128* 90 294*   BUN 27* 25* 30*   CREATININE 0.75 0.68 0.92   CALCIUM 7.6* 7.8* 7.2*     Recent Labs     07/18/22  1020   INR 1.49*               Imaging  MR-SHOULDER-W/O LEFT   Final Result      1.  Large joint effusion with a large amount of fluid seen extending into the subacromial/subdeltoid bursa with synovitis and small low signal foci within the bursa consistent with pockets of gas. These findings are very suspicious for septic arthritis    and septic bursitis.      2.  Complete full-thickness tear of the supraspinatus tendon with retraction to the bony glenoid.      3.  Full-thickness tear portion of the subscapularis tendon.      4.  Partial-thickness tear of the articular surface fibers of the infraspinatus tendon.      5.  Prominent muscle edema and atrophy involving the rotator cuff as well as all the muscles surrounding the shoulder which may represent myositis.      6.  Osteoarthritis of the glenohumeral joint.      7.   Diffuse tearing of the glenoid labrum.      8.  Subluxation of the long head of the biceps tendon medially from the bicipital groove with marked thinning of the intra-articular portion consistent with tendinosis.      9.  Extensive soft tissue edema to include fat and muscle involving all of the imaged structures around the shoulder likely representing cellulitis.      10.  Widening of the acromioclavicular joint with fragmentation of the distal clavicle consistent with old posttraumatic change versus surgical change.      [id number] VIEW   Final Result      Nonobstructive bowel gas pattern.      EC-ECHOCARDIOGRAM COMPLETE W/O CONT   Final Result      DX-CHEST-PORTABLE (1 VIEW)   Final Result      1.  Interstitial infiltrates versus edema.   2.  Cardiomegaly.      DX-KNEE 2- RIGHT   Final Result      1.  Negative for fracture or malalignment      2.  Right knee arthroplasty appears within normal limits      DX-KNEE 2- LEFT   Final Result      1.  New lucency adjacent to the tibial component of arthroplasty suspicious for loosening or infection      2.  No other finding      US-EXTREMITY VENOUS UPPER UNILAT LEFT   Final Result      US-RENAL   Final Result      1.  Unremarkable kidneys.   2.  Limited evaluation of the bladder.      CT-ABDOMEN-PELVIS W/O   Final Result         Limited exam due to lack of IV contrast.         1. Mild diffuse wall thickening of the urinary bladder could relate to infection or inflammation. Correlate with UA. No hydronephrosis.   2. Nonspecific mildly prominent fluid-filled proximal small bowel in the left abdomen. This could be seen with enteritis, focal ileus or early obstruction.   3. Cirrhotic liver with portal hypertension and splenomegaly. Wall thickening of the ascending colon could relate to portal hypertension.   4. No ascites.      DX-SHOULDER 2+ LEFT   Final Result      1.  No evidence of acute fracture or dislocation.      2.  Old posttraumatic changes of the distal  clavicle.      DX-CHEST-PORTABLE (1 VIEW)   Final Result      Cardiomegaly.           Assessment/Plan  * Bacteremia  Assessment & Plan  2 bottles with group B strep await sensitivity and further specification  Patient reported history of severe bacteremia a year ago after knee surgery last year, which required skin grafting of both lower extremity and was hospitalized at [place] when she visited her son there    Cont Rocephin  Check TTE  Repeat blood cultures  Consulted ID      Left knee pain  Assessment & Plan  History of left knee replacement 2017, which was complicated with infection 2 weeks later  Patient had recurrent left knee infection last year which was complicated with bacteremia and she was hospitalized at the [place] hospital when she visited her son     She was not able to walk on her left knee because of pain  -x-ray showed new fluid retention, suspected infection    Ortho consulted, considered considered infected TKA  S/p fluid drainage per Ortho 7/16   fluid culture pending  Ortho plan to  have further surgical intervention tomorrow    On Rocephin, ID following    Hyponatremia  Assessment & Plan  123 on admission  High SG on UA: probable hypovolemic and alcohol use  Cont to monitor    Resolved    Acute cystitis without hematuria  Assessment & Plan  On Rocephin for bacteremia    Septic arthritis of shoulder, left (HCC)  Assessment & Plan  MRI left shoulder showed septic arthritis  subdeltoid abscess    Ortho consulted,   S/p  left shoulder I/D 7/17, SONY drain in place  fluid culture pending  On Rocephin, ID following    Iron deficiency anemia  Assessment & Plan  Iron study consistent with iron deficiency  Hgb 10.7 at admission, stable    holding IV iron replacement given ongoing infection    Metabolic encephalopathy  Assessment & Plan  Likely multifactorial in conjunction with narcotic use  S/p Narcan treatment, improved  Start lacutlose  Cont to treat infection     resolved      Hyperlipidemia- (present  on admission)  Assessment & Plan  Continue Lipitor    ELIZABETH (acute kidney injury) (HCC)- (present on admission)  Assessment & Plan  Likely secondary dehydration  4.7 on admission   Good UOP  DC IVFs  Cont to monitor  Renally dose medications as appropriate    Resolved    Atrial fibrillation (HCC)- (present on admission)  Assessment & Plan  On apixaban and dronedarone as outpt    Cirrhosis (HCC)- (present on admission)  Assessment & Plan  Secondary to ETOH  Lactulose  monitor    Hypertension- (present on admission)  Assessment & Plan  Resume the home losartan    Controlled type 2 diabetes mellitus with hyperglycemia, without long-term current use of insulin (HCC)- (present on admission)  Assessment & Plan  Sliding-scale insulin, diabetic diet  Running mid to high 100s  Send A1c  On metformin  As outpt       VTE prophylaxis: enoxaparin ppx    I have performed a physical exam and reviewed and updated ROS and Plan today (7/18/2022). In review of yesterday's note (7/17/2022), there are no changes except as documented above.

## 2022-07-18 NOTE — THERAPY
Physical Therapy   Daily Treatment     Patient Name: April Alicia  Age:  62 y.o., Sex:  female  Medical Record #: [id number]  Today's Date: 7/18/2022     Precautions  Precautions: Fall Risk;Weight Bearing As Tolerated Left Upper Extremity  Comments: s/p I&D L shoulder given abscess and septic arthritis; also septic L TKA pending sx    Assessment    Patient progressing with functional mobility. She reported improvement in L shoulder pain but still demonstrated limited functional use of LUE. She was able to stand from EOB with CGA and used FWW for support with RUE (unable to lift LUE to grasp walker handle), she maintained L knee flexion in standing and had limited ability to weight shift into LLE for side steps from EOB to chair. She is motivated to progress to PLOF. Ortho PA in room during session and reported plan for L knee surgery 7/19. Will continue to follow.    Plan    Continue current treatment plan.    DC Equipment Recommendations: Unable to determine at this time  Discharge Recommendations: Recommend post-acute placement for additional physical therapy services prior to discharge home     Objective       07/18/22 1021   Charge Group   Charges  Yes   PT Therapeutic Activities 3  (40 min)   Precautions   Precautions Fall Risk;Weight Bearing As Tolerated Left Upper Extremity   Comments s/p I&D L shoulder given abscess and septic arthritis; also septic L TKA pending sx   Vitals   O2 (LPM) 0   O2 Delivery Device None - Room Air   Pain 0 - 10 Group   Location Shoulder;Knee   Location Orientation Left   Therapist Pain Assessment During Activity;Nurse Notified   Cognition    Cognition / Consciousness WDL   Level of Consciousness Alert   Comments much improved from prior. Slight [place] but appears to be at/near baseline   Balance   Sitting Balance (Static) Fair   Sitting Balance (Dynamic) Fair   Standing Balance (Static) Fair -   Standing Balance (Dynamic) Fair -   Weight Shift Sitting Fair   Weight Shift  Standing Poor   Skilled Intervention Verbal Cuing;Compensatory Strategies;Facilitation   Comments with FWW in standing and no overt LOB, pain and ability to fully weight bear through L extremities limiting balance   Gait Analysis   Gait Level Of Assist Unable to Participate  (pre gait with side steps EOB)   Weight Bearing Status WBAT LUE   Vision Deficits Impacting Mobility NT   Bed Mobility    Supine to Sit Minimal Assist   Sit to Supine   (NT, left in chair)   Scooting Supervised   Skilled Intervention Verbal Cuing;Compensatory Strategies;Sequencing   Comments asked therapist to hold LUE for leverage moving supine>sitting, used RUE to move to EOB   Functional Mobility   Sit to Stand Contact Guard Assist   Bed, Chair, Wheelchair Transfer Contact Guard Assist   Transfer Method Stand Step   Skilled Intervention Verbal Cuing;Compensatory Strategies;Sequencing   How much difficulty does the patient currently have...   Turning over in bed (including adjusting bedclothes, sheets and blankets)? 1   Sitting down on and standing up from a chair with arms (e.g., wheelchair, bedside commode, etc.) 1   Moving from lying on back to sitting on the side of the bed? 1   How much help from another person does the patient currently need...   Moving to and from a bed to a chair (including a wheelchair)? 3   Need to walk in a hospital room? 2   Climbing 3-5 steps with a railing? 1   6 clicks Mobility Score 9   Activity Tolerance   Sitting in Chair left in chair   Sitting Edge of Bed 20 min   Standing 3 min x2   Comments limited by pain, weakness   Patient / Family Goals    Patient / Family Goal #1 walk again   Goal #1 Outcome Progressing as expected   Short Term Goals    Short Term Goal # 1 Patient will move supine<>sitting EOB without bed features with supervision within 6tx in order to get in/out of bed   Goal Outcome # 1 Progressing as expected   Short Term Goal # 2 Patient will move sitting<>standing with supervision within 6tx in  order to initiate transfers and gait   Goal Outcome # 2 Progressing as expected   Short Term Goal # 3 Patient will ambulate 150ft with supervision within 6tx in order to access environment   Goal Outcome # 3 Goal not met   Short Term Goal # 4 Patient will ascend/descend FOS with supervision within 6tx in order to access apartment   Goal Outcome # 4 Goal not met   Anticipated Discharge Equipment and Recommendations   DC Equipment Recommendations Unable to determine at this time   Discharge Recommendations Recommend post-acute placement for additional physical therapy services prior to discharge home   Interdisciplinary Plan of Care Collaboration   IDT Collaboration with  Nursing   Patient Position at End of Therapy Seated;Call Light within Reach;Phone within Reach  (phlebotomist at bedside for blood draw)   Collaboration Comments RN aware of visit, response   Session Information   Date / Session Number  7/18-2 (1/3, 7/24)

## 2022-07-18 NOTE — OP REPORT
DATE OF SERVICE:  07/17/2022     PREOPERATIVE DIAGNOSES:  1.  Left shoulder septic arthritis.  2.  Left shoulder subdeltoid abscess.     POSTOPERATIVE DIAGNOSES:  1.  Left shoulder septic arthritis.  2.  Left shoulder subdeltoid abscess.     PROCEDURES PERFORMED:  1.  Left shoulder arthrotomy and lavage for infection with partial   synovectomy.  2.  Left shoulder incision and drainage of the subdeltoid abscess.     SURGEON:  Obdulio Gray MD     ANESTHESIOLOGIST:  Lian Washington MD     ANESTHESIA:  General.     ASSISTANT:  Laura Rojas PA-C     SPECIMENS:  Fluid from the fluid collection deep within the shoulder was sent   to lab for aerobic and anaerobic cultures as well as a fluid swab.     DRAINS:  A 10-[place] SONY drain was applied to the left shoulder joint and   subdeltoid abscess region.     INDICATIONS FOR PROCEDURE:  The patient is a 62-year-old female.  She was   admitted to the hospitalist service on 07/10/2022. Ultimately, she was found   to have sepsis and this was thought to be from a urological source.  She was   also bacteremic.  She has been on Eliquis for atrial fibrillation, has   multiple medical comorbidities including type 2 diabetes and acute kidney   injury.  I was asked to consult provide treatment recommendations for her left   knee, which was painful, swollen and she has a history of knee arthroplasty.    She also is complaining of left shoulder pain.  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  She had left shoulder pain.  I attempted arthrocentesis,   but was only able to get a small amount of fluid, which was borderline   concerning for infection, but I did obtain an MRI, which showed a large fluid   collection consistent with septic arthritis and associated subdeltoid abscess.    I recommended going to the operating room for formal incision and drainage.    She signed informed consent  preoperatively and wished to proceed with surgery   as outlined above. The plan with her knee was to touch base with my colleague   who is joint replacement specialist to consider further surgical treatment   options.     DESCRIPTION OF PROCEDURE:  The patient was met in the preoperative holding   area.  Her surgical site was signed.  Her consent was confirmed to be   accurate.  She was taken back to the operating room and general anesthesia was   induced.  Left lower extremity was prepped and draped in the usual sterile   fashion.  A formal timeout was performed to confirm the patient's correct   name, correct surgical site, correct procedure and correct laterality.  A   deltopectoral approach was performed to the shoulder joint with a scalpel down   through skin.  Dissection was carried with Bovie cautery down to the   deltopectoral interval.  The cephalic vein was mobilized and protected.  Blunt   dissection was carried down to the subdeltoid area where there was copious   amounts of cloudy fluid expressed.  This was collected and sent to lab for   aerobic and anaerobic cultures.  This communicated with her glenohumeral joint   through a large full-thickness retracted supraspinatus rotator cuff tear   consistent with septic arthritis.  Thorough lavage of the glenohumeral joint   as well as the subdeltoid abscess area was performed with the Pulsavac using   normal saline.  I excised loculated fibrinous tissue from the subdeltoid   recess with a rongeur and excised some synovium off of the glenohumeral joint   and the footprint of the greater tuberosity where the rotator cuff tear was   with a rongeur.  Once sufficient irrigation and debridement was achieved, I   then placed a 10-[place] SONY drain within the subdeltoid recess as well as the   glenohumeral joint and the subacromial space, which was communicating and   reapproximated deltopectoral interval loosely with 0 PDS, subcutaneous layers   with 0 PDS, 2-0 PDS  and the skin edges with staples.  Wounds were thoroughly   cleansed, dried and sterile dressings applied.  She was awoken from anesthesia   and transferred on the gurney and taken to postanesthesia care unit in stable   condition.     PLAN:  1.  The patient will be readmitted to the medical service postop.  2.  Recommend continuing antibiotics, following up intraoperative cultures and   following up infectious disease recommendations.  3.  Recommend continuing her SONY drain for now until it was pretty much dry.  4.  I will discuss her infected knee arthroplasty with my colleague, Dr. Luz, a joint replacement specialist, to help guide further treatment   recommendations for that particular issue.    Laura Rojas PA-C was present and essential for the duration of the procedure.   Required assistance included positioning, draping, retracting, and wound closure.       ______________________________  MD MELBA Brady/WILMER    DD:  07/17/2022 14:44  DT:  07/17/2022 18:14    Job#:  [id number]

## 2022-07-18 NOTE — PROGRESS NOTES
Report received, chart review complete. Patient resting in bed, call light in reach, care needs met at this time. A&Ox4. Pain managed well. Up with max assist, tolerates poorly. Tolerating room air O2. Glucose ACHS. Patient progressing well at this time, educated on call light use and to call for help for any needs

## 2022-07-18 NOTE — PROGRESS NOTES
62yoF with multiple medical issues admitted recently with sepsis.  Has infected left TKA.  Also has left shoulder septic arthritis with associated abscess s/p I&D 7/17.      S: Shoulder is painful but better than before surgery.    O:    Vitals:    07/18/22 0355   BP: 102/68   Pulse:    Resp:    Temp:    SpO2:      Exam:  General-NAD, following commands  LUE-shoulder dressing c/d/i, SONY drain with serosanguinous drainage NVI distally  LLE-knee effusion, limited ROM, knee warm to touch, NVI distally    A: 62yoF with multiple medical issues admitted recently with sepsis.  Has infected left TKA.  Also has left shoulder septic arthritis with associated abscess s/p I&D 7/17.       Recs:  --continue abx per ID recs   --will discuss with Dr. Luz regarding surgical management for left septic TKA  --continue abx, fu ID recs

## 2022-07-18 NOTE — PROGRESS NOTES
Infectious Disease Progress Note    Author: Aurea Ramos M.D. Date & Time of service: 2022  8:37 AM    Chief Complaint:  Group B streptococcus bacteremia      Interval History:    AF, O2 RA, complaining of pain in left knee.     AF, O2 RA, complaining of some pain in left knee but overall unchanged.      Review of Systems:  Review of Systems   Respiratory: Negative for cough and shortness of breath.    Gastrointestinal: Negative for abdominal pain, constipation, diarrhea, nausea and vomiting.   Genitourinary: Negative for dysuria.   Musculoskeletal: Positive for joint pain and myalgias.   Psychiatric/Behavioral: The patient is not nervous/anxious.        Hemodynamics:  Temp (24hrs), Av.3 °C (97.3 °F), Min:36.1 °C (96.9 °F), Max:36.9 °C (98.4 °F)  Temperature: 36.2 °C (97.2 °F)  Pulse  Av.1  Min: 44  Max: 126   Blood Pressure: 107/58       Physical Exam:  Physical Exam  Cardiovascular:      Rate and Rhythm: Normal rate and regular rhythm.      Heart sounds: Normal heart sounds.   Pulmonary:      Effort: Pulmonary effort is normal.      Breath sounds: Normal breath sounds.   Abdominal:      General: Bowel sounds are normal. There is no distension.      Palpations: Abdomen is soft.   Musculoskeletal:      Comments: Bilateral knee pain, left arm and shoulder with edema, surgical dressings in place   Skin:     General: Skin is warm and dry.   Neurological:      General: No focal deficit present.      Mental Status: She is oriented to person, place, and time.   Psychiatric:         Mood and Affect: Mood normal.         Behavior: Behavior normal.         Meds:    Current Facility-Administered Medications:   •  [START ON 2022] losartan  •  cefTRIAXone (ROCEPHIN) IV  •  loperamide  •  oxyCODONE immediate-release  •  lidocaine  •  POC Blood Glucose **AND** insulin regular  •  Notify MD and PharmD if FSBG level is less than or equal to 70 mg/dL or patient is showing signs/symptoms of  hypoglycemia (tachycardia, palpitations, diaphoresis, clammy, tremulousness, nausea, confused) **AND** Administer 20 grams of glucose (approximately 8 ounces of fruit juice) every 15 minutes PRN FSBS less than 70 mg/dL **AND** dextrose bolus  •  lactulose  •  dronedarone  •  albuterol  •  atorvastatin  •  acetaminophen    Labs:  Recent Labs     07/16/22 0427 07/17/22  0140 07/18/22  0325   WBC 10.8 10.7 10.8   RBC 3.88* 3.90* 3.19*   HEMOGLOBIN 10.9* 11.1* 9.0*   HEMATOCRIT 32.7* 33.7* 27.7*   MCV 84.3 86.4 86.8   MCH 28.1 28.5 28.2   RDW 45.1 46.5 46.5   PLATELETCT 114* 126* 108*   MPV 9.0 9.1 10.1     Recent Labs     07/16/22 0427 07/17/22  0140 07/18/22  0325   SODIUM 133* 132* 133*   POTASSIUM 3.9 4.4 4.7   CHLORIDE 106 107 107   CO2 19* 18* 18*   GLUCOSE 128* 90 294*   BUN 27* 25* 30*     Recent Labs     07/16/22 0427 07/17/22  0140 07/18/22  0325   CREATININE 0.75 0.68 0.92       Imaging:  CT-ABDOMEN-PELVIS W/O    Result Date: 7/10/2022  7/10/2022 4:46 PM HISTORY/REASON FOR EXAM:  Abdominal pain, fever. TECHNIQUE/EXAM DESCRIPTION: CT scan of the abdomen and pelvis without contrast. Noncontrast helical scanning was obtained from the diaphragmatic domes through the pubic symphysis. Low dose optimization technique was utilized for this CT exam including automated exposure control and adjustment of the mA and/or kV according to patient size. COMPARISON: None. FINDINGS: Lower Chest: Minimal bibasilar atelectasis. Liver: Cirrhotic appearing liver. Spleen: Enlarged. Pancreas: Unremarkable. Gallbladder: Distended gallbladder. Biliary: No biliary dilatation.. Adrenal glands: Nonenlarged. Kidneys: No renal calculus. No hydronephrosis.. Bowel: Wall thickening of the descending colon. Nonspecific mildly prominent fluid-filled small bowel in the left abdomen. Moderate amount of stool throughout the colon. Lymph nodes: No adenopathy. Vasculature: The abdominal aorta is normal in caliber Peritoneum: Unremarkable without  ascites. Musculoskeletal: No aggressive osseous lesion. Moderate degenerative change of the lumbar spine. Pelvis: Mild diffuse wall thickening of the urinary bladder.. No pelvic free fluid.     Limited exam due to lack of IV contrast. 1. Mild diffuse wall thickening of the urinary bladder could relate to infection or inflammation. Correlate with UA. No hydronephrosis. 2. Nonspecific mildly prominent fluid-filled proximal small bowel in the left abdomen. This could be seen with enteritis, focal ileus or early obstruction. 3. Cirrhotic liver with portal hypertension and splenomegaly. Wall thickening of the ascending colon could relate to portal hypertension. 4. No ascites.    [id number] VIEW    Result Date: 7/16/2022 7/16/2022 2:08 PM HISTORY/REASON FOR EXAM:  Abdominal Pain. TECHNIQUE/EXAM DESCRIPTION AND NUMBER OF VIEWS:  1 view(s) of the abdomen. COMPARISON: CT 7/10/2022 FINDINGS: No free air under the diaphragm.  No significant dilated small bowel loops or air-fluid levels. There is air seen throughout the colon. Amaro catheter in place. No suspicious calcifications. No acute osseous abnormality. Spondylosis.     Nonobstructive bowel gas pattern.    DX-CHEST-PORTABLE (1 VIEW)    Result Date: 7/14/2022 7/14/2022 1:12 AM HISTORY/REASON FOR EXAM:  Shortness of Breath TECHNIQUE/EXAM DESCRIPTION AND NUMBER OF VIEWS: Single portable view of the chest. COMPARISON: 7/10/2022 FINDINGS: HEART: Enlarged. There is atherosclerotic calcification in the aortic arch. LUNGS: Low lung volumes. Increased interstitial opacities.. Calcified mediastinal nodes. PLEURA: No effusion or pneumothorax.     1.  Interstitial infiltrates versus edema. 2.  Cardiomegaly.    DX-CHEST-PORTABLE (1 VIEW)    Result Date: 7/10/2022  7/10/2022 4:02 PM HISTORY/REASON FOR EXAM: Hypotension. Sepsis. TECHNIQUE/EXAM DESCRIPTION AND NUMBER OF VIEWS: Single portable view of the chest. COMPARISON: 9/9/2020 FINDINGS: There is no evidence of focal  consolidation or evidence of pulmonary edema. There is no pleural effusion. The heart is enlarged. There are calcified mediastinal lymph nodes.     Cardiomegaly.    DX-KNEE 2- RIGHT    Result Date: 7/13/2022 7/13/2022 1:17 PM HISTORY/REASON FOR EXAM:  Atraumatic Pain/Swelling/Deformity. Unable to bear weight on right leg TECHNIQUE/EXAM DESCRIPTION AND NUMBER OF VIEWS:  2 views of the RIGHT knee. COMPARISON: None FINDINGS: There is a right knee arthroplasty present. The arthroplasty appears within normal limits. There are no fracture.     1.  Negative for fracture or malalignment 2.  Right knee arthroplasty appears within normal limits    DX-KNEE 2- LEFT    Result Date: 7/13/2022 7/13/2022 12:55 PM HISTORY/REASON FOR EXAM:  Atraumatic Pain/Swelling/Deformity. Left leg pain TECHNIQUE/EXAM DESCRIPTION AND NUMBER OF VIEWS:  2 views of the LEFT knee. COMPARISON: Left knee x-ray 9/13/2020 FINDINGS: There is no fracture. Alignment is normal. There is a left knee arthroplasty. There are new areas of lucency adjacent to the tibial component both medially and laterally. This is suspicious for prosthetic loosening or infection.     1.  New lucency adjacent to the tibial component of arthroplasty suspicious for loosening or infection 2.  No other finding    DX-SHOULDER 2+ LEFT    Result Date: 7/10/2022  7/10/2022 4:38 PM HISTORY/REASON FOR EXAM: Left-sided shoulder pain. TECHNIQUE/EXAM DESCRIPTION AND NUMBER OF VIEWS:  3 views of the LEFT shoulder. COMPARISON: None FINDINGS: Bone mineralization is normal. No evidence of acute fracture. There is old posttraumatic change of the distal clavicle with a nonunified fracture fragment. Soft tissues are normal.     1.  No evidence of acute fracture or dislocation. 2.  Old posttraumatic changes of the distal clavicle.    MR-SHOULDER-W/O LEFT    Result Date: 7/17/2022 7/16/2022 6:29 PM HISTORY/REASON FOR EXAM: Left shoulder infection. TECHNIQUE/EXAM DESCRIPTION: MRI of the LEFT  shoulder without contrast. Using a M87 Signa 1.5 Suzanne MRI scanner, T1 sagittal, fast spin-echo T2 fat-suppressed oblique coronal, sagittal, and axial and intermediate fast spin-echo oblique coronal images were obtained. COMPARISON: None. FINDINGS: The study is limited by patient motion artifact. Osseous acromial outlet: The acromion demonstrates a curved undersurface. There is no lateral downsloping. There is no evidence of an inferiorly directed subacromial enthesophyte. The acromioclavicular joint is widened with fragmentation of the distal clavicle possibly related to prior surgery or trauma. There is a large amount of fluid seen within the subacromial/subdeltoid bursa. Large joint effusion extends into the subacromial/subdeltoid bursa. There are pockets of low signal gas and the subdeltoid bursa. There is synovitis within the bursa. Rotator cuff: There is complete full-thickness tear of the supraspinatus tendon with retraction to the bony glenoid. There is prominent muscle atrophy and edema. There is partial thickness tear of the articular surface fibers of the infraspinatus with prominent muscle atrophy and edema. There is full-thickness tear of some of the fibers of the subscapularis tendon. There is prominent muscle edema and atrophy. Glenoid labrum: There is diffuse tearing of the glenoid labrum include the biceps anchor. Osseous structures/Cartilaginous surfaces: There is cartilage loss involving the glenohumeral joint with osteophytic spurring. There is mild marrow edema involving the humeral head and edema involving the bony glenoid posteriorly. There is some posterior  subluxation of the humeral head with respect to the bony glenoid. Miscellaneous: There is a large joint effusion with synovitis. Fluid is again noted to extend into the subacromial/subdeltoid bursa where there is gas within the bursa. The long head of the biceps tendon is dislocated medially from the bicipital groove and is diffusely  thickened. There is extensive edema/induration of all of the soft tissues surrounding the shoulder. This extends into the axilla and proximal humerus.     1.  Large joint effusion with a large amount of fluid seen extending into the subacromial/subdeltoid bursa with synovitis and small low signal foci within the bursa consistent with pockets of gas. These findings are very suspicious for septic arthritis and septic bursitis. 2.  Complete full-thickness tear of the supraspinatus tendon with retraction to the bony glenoid. 3.  Full-thickness tear portion of the subscapularis tendon. 4.  Partial-thickness tear of the articular surface fibers of the infraspinatus tendon. 5.  Prominent muscle edema and atrophy involving the rotator cuff as well as all the muscles surrounding the shoulder which may represent myositis. 6.  Osteoarthritis of the glenohumeral joint. 7.  Diffuse tearing of the glenoid labrum. 8.  Subluxation of the long head of the biceps tendon medially from the bicipital groove with marked thinning of the intra-articular portion consistent with tendinosis. 9.  Extensive soft tissue edema to include fat and muscle involving all of the imaged structures around the shoulder likely representing cellulitis. 10.  Widening of the acromioclavicular joint with fragmentation of the distal clavicle consistent with old posttraumatic change versus surgical change.    US-RENAL    Result Date: 7/10/2022  7/10/2022 7:06 PM HISTORY/REASON FOR EXAM:  Abnormal Labs TECHNIQUE/EXAM DESCRIPTION: Renal ultrasound. COMPARISON:  CT abdomen and pelvis 7/10/2022 FINDINGS: The right kidney measures 12.4 cm.  The right kidney appears normal in contour and parenchymal echotexture. The corticomedullary differentiation is preserved. The right renal collecting system is not dilated. No hydronephrosis. There are no renal calculi. The left kidney measures 10.8 cm. The left kidney appears normal in contour and parenchymal echotexture. The  corticomedullary differentiation is preserved. The left renal collecting system is not dilated. No hydronephrosis. There are no renal calculi. The bladder is decompressed around a Amaro catheter.     1.  Unremarkable kidneys. 2.  Limited evaluation of the bladder.    US-EXTREMITY VENOUS UPPER UNILAT LEFT    Result Date: 2022   Upper Extremity  Venous Duplex Report  Vascular Laboratory  CONCLUSIONS  1. No evidence of deep venous thrombosis.  2. Trace amount of fluid along the distal biceps, nonspecific, possibly  posttraumatic. No drainable fluid collections.  FRANCISCA TURNER  Exam Date:     2022 01:05  Room #:     Inpatient  Priority:     Stat  Ht (in):             Wt (lb):  Ordering Physician:        GHADA RAMOS  Referring Physician:       [id number] [place]  Sonographer:               Yana Haney [email or web address]  Study Type:                Complete Unilateral  Technical Quality:         Adequate  Age:    62    Gender:     F  MRN:    [id number]  :    1960      BSA:  Indications:     Localized swelling, mass and lump, unspecified upper limb,                   Edema, unspecified  CPT Codes:       35545  ICD Codes:       R22.30  R60.9  History:         Left upper extremity swelling/edema. No priror exams.  Limitations:  PROCEDURES:  Left upper extremity venous duplex imaging.  The following venous structures were evaluated: internal jugular,  subclavian, axillary, brachial, cephalic, and basilic veins.  Serial compression, color, and spectral Doppler flow evaluations were  performed.  FINDINGS:  Left upper extremity-  No evidence of deep venous thrombosis.  Evidence of subacute to chronic superficial venous thrombosis at the distal  cephalic vein.  All other veins demonstrate complete color filling and compressibility with  normal venous flow dynamics including spontaneous flow and respiratory  phasicity.  Flow was evaluated in the contralateral subclavian vein and normal venous  flow dynamics  including spontaneous flow and respiratory phasic variation  were demonstrated.  Incidental Finding:  Non-vascularized anechoic structure seen at the left mid bicep.  Santosh Stephenson MD  (Electronically Signed)  Final Date:      2022                   03:24    EC-ECHOCARDIOGRAM COMPLETE W/O CONT    Result Date: 2022  Transthoracic Echo Report Echocardiography Laboratory CONCLUSIONS Normal left ventricular systolic function. The left ventricular ejection fraction is visually estimated to be 60%. Moderate concentric left ventricular hypertrophy. Mild mitral regurgitation. Normal right ventricular size and systolic function. FRANCISCA TURNER Exam Date:         2022                    15:30 Exam Location:     Inpatient Priority:          Routine Ordering Physician:        GHADA RAMOS Referring Physician:       [id number] [place] Sonographer:               Marybeth DAS Age:    62     Gender:    F MRN:    [id number] :    1960 BSA:    1.99   Ht (in):    67     Wt (lb):    192 Exam Type:     Complete Indications:     Shortness of breath ICD Codes:       R06.02 CPT Codes:       56327 BP:   73     /   45     HR:   96 Technical Quality:       Fair MEASUREMENTS  (Male / Female) Normal Values 2D ECHO LV Diastolic Diameter PLAX        5.2 cm                4.2 - 5.9 / 3.9 - 5.3 cm LV Systolic Diameter PLAX         3.7 cm                2.1 - 4.0 cm IVS Diastolic Thickness           1.6 cm                LVPW Diastolic Thickness          1.2 cm                LVOT Diameter                     2.2 cm                Estimated LV Ejection Fraction    60 %                  LV Ejection Fraction MOD BP       68.2 %                >= 55  % LV Ejection Fraction MOD 4C       65.7 %                LV Ejection Fraction MOD 2C       72.4 %                IVC Diameter                      1 cm                  DOPPLER AV Peak Velocity                  1.6 m/s               AV Peak Gradient                   10.2 mmHg             AV Mean Gradient                  6 mmHg                LVOT Peak Velocity                0.82 m/s              AV Area Cont Eq vti               1.5 cm2               Mitral E Point Velocity           1.4 m/s               TR Peak Velocity                  275 cm/s              * Indicates values subject to auto-interpretation LV EF:  60    % FINDINGS Left Ventricle Normal left ventricular chamber size. Moderate concentric left ventricular hypertrophy. Normal left ventricular systolic function. The left ventricular ejection fraction is visually estimated to be 60%. Normal diastolic function. Right Ventricle Normal right ventricular size and systolic function. Right Atrium Normal right atrial size. Normal inferior vena cava size and inspiratory collapse. Left Atrium Mildly dilated left atrium. Mitral Valve Structurally normal mitral valve. No mitral stenosis. Mild mitral regurgitation. Aortic Valve Structurally normal aortic valve without stenosis or regurgitation. The aortic valve appears trileaflet. Tricuspid Valve Structurally normal tricuspid valve. No tricuspid stenosis. Mild tricuspid regurgitation. Estimated right ventricular systolic pressure is 34  mmHg. Pulmonic Valve Structurally normal pulmonic valve without stenosis or regurgitation. Pericardium No pericardial effusion. Aorta Normal aortic root for body surface area. The ascending aorta diameter is 3.1 cm. Grant Jimenez M.D. (Electronically Signed) Final Date:     14 July 2022                 16:47    X-ray Shoulder 2+ View (Complete) Left    Result Date: 7/6/2022  CLINICAL DATA: Shoulder Pain, TECHNICAL: 3 views. COMPARISON: April 18, 2021 FINDINGS: The left humeral head is subluxed inferior relative to the typical anatomic relationship with the glenoid fossa.  There is increased tissue or fluid density in the subacromial space. Left glenohumeral joint space remains visible on frontal view. Old fracture deformity of the  distal left clavicle.    INFERIOR SUBLUXATION OF THE LEFT HUMERAL HEAD RELATIVE TO THE GLENOID WITH INCREASED SUBACROMIAL DENSITY WHICH COULD BE RELATED TO SOFT TISSUE SWELLING OR EFFUSION. NO ACUTE FRACTURE. OLD FRACTURE DEFORMITY OF THE DISTAL LEFT CLAVICLE.      Micro:  Results     Procedure Component Value Units Date/Time    GRAM STAIN [[id number]] Collected: 07/17/22 1352    Order Status: Completed Specimen: Wound Updated: 07/18/22 0142     Significant Indicator .     Source WND     Site Left shoulder abscess SWAB     Gram Stain Result Many WBCs.  No organisms seen.      Narrative:      AFB unacceptable on e-swab  07/17/2022  14:53  Surgery - swabs received    GRAM STAIN [[id number]] Collected: 07/17/22 1352    Order Status: Completed Specimen: Wound Updated: 07/18/22 0142     Significant Indicator .     Source WND     Site Left Shoulder Abscess     Gram Stain Result Many WBCs.  No organisms seen.      Narrative:      Surgery Specimen    Fungal Smear [[id number]] Collected: 07/17/22 1352    Order Status: Completed Specimen: Wound Updated: 07/18/22 0142     Significant Indicator NEG     Source WND     Site Left Shoulder Abscess     Fungal Smear Results No fungal elements seen.    Narrative:      Surgery Specimen    Fungal Smear [[id number]] Collected: 07/17/22 1352    Order Status: Completed Specimen: Wound Updated: 07/18/22 0142     Significant Indicator NEG     Source WND     Site Left shoulder abscess SWAB     Fungal Smear Results No fungal elements seen.    Narrative:      AFB unacceptable on e-swab  07/17/2022  14:53  Surgery - swabs received    CULTURE WOUND W/ GRAM STAIN [[id number]] Collected: 07/17/22 1352    Order Status: No result Specimen: Wound Updated: 07/18/22 0142     Significant Indicator NEG     Source WND     Site Left shoulder abscess SWAB     Culture Result -     Gram Stain Result Many WBCs.  No organisms seen.      Narrative:      AFB unacceptable on e-swab  07/17/2022  14:53  Surgery - swabs  received    Anaerobic Culture [[id number]] Collected: 07/17/22 1352    Order Status: No result Specimen: Wound Updated: 07/18/22 0142     Significant Indicator NEG     Source WND     Site Left shoulder abscess SWAB     Culture Result -    Narrative:      AFB unacceptable on e-swab  07/17/2022  14:53  Surgery - swabs received    Fungal Culture [[id number]] Collected: 07/17/22 1352    Order Status: No result Specimen: Wound Updated: 07/18/22 0142     Significant Indicator NEG     Source WND     Site Left shoulder abscess SWAB     Culture Result -     Fungal Smear Results No fungal elements seen.    Narrative:      AFB unacceptable on e-swab  07/17/2022  14:53  Surgery - swabs received    CULTURE WOUND W/ GRAM STAIN [[id number]] Collected: 07/17/22 1352    Order Status: No result Specimen: Wound Updated: 07/18/22 0142     Significant Indicator NEG     Source WND     Site Left Shoulder Abscess     Culture Result -     Gram Stain Result Many WBCs.  No organisms seen.      Narrative:      Surgery Specimen    Anaerobic Culture [[id number]] Collected: 07/17/22 1352    Order Status: No result Specimen: Wound Updated: 07/18/22 0142     Significant Indicator NEG     Source WND     Site Left Shoulder Abscess     Culture Result -    Narrative:      Surgery Specimen    Fungal Culture [[id number]] Collected: 07/17/22 1352    Order Status: No result Specimen: Wound Updated: 07/18/22 0142     Significant Indicator NEG     Source WND     Site Left Shoulder Abscess     Culture Result -     Fungal Smear Results No fungal elements seen.    Narrative:      Surgery Specimen    AFB Culture [[id number]] Collected: 07/17/22 1352    Order Status: No result Specimen: Wound Updated: 07/18/22 0142     Significant Indicator NEG     Source WND     Site Left Shoulder Abscess     Culture Result -     AFB Smear Results -    Narrative:      Surgery Specimen    AFB Culture [[id number]] Collected: 07/16/22 1500    Order Status: Completed Specimen: Synovial  Updated: 07/17/22 1837     Significant Indicator NEG     Source SYNO     Site Left Knee     Culture Result Culture in progress.     AFB Smear Results No acid fast bacilli seen.    Narrative:      Previous comment was modified by ROLANDO at 17:15 on 07/16/22.  LEFT KNEE  ?ordered per hard req  LEFT KNEE    Fungal Culture [[id number]] Collected: 07/16/22 1500    Order Status: Completed Specimen: Synovial Updated: 07/17/22 1837     Significant Indicator NEG     Source SYNO     Site Left Knee     Culture Result Culture in progress.     Fungal Smear Results No fungal elements seen.    Narrative:      Previous comment was modified by ROLANDO at 17:15 on 07/16/22.  LEFT KNEE  ?ordered per hard req  LEFT KNEE    FLUID CULTURE W/GRAM STAIN [[id number]] Collected: 07/16/22 1500    Order Status: Completed Specimen: Synovial Updated: 07/17/22 1837     Significant Indicator NEG     Source SYNO     Site Left Knee     Culture Result No growth at 24 hours.     Gram Stain Result Many WBCs.  No organisms seen.      Narrative:      Previous comment was modified by ROLANDO at 17:15 on 07/16/22.  LEFT KNEE  ?ordered per hard req  LEFT KNEE    FLUID CULTURE W/GRAM STAIN [[id number]] Collected: 07/16/22 1500    Order Status: Completed Specimen: Synovial Updated: 07/17/22 1828     Significant Indicator NEG     Source SYNO     Site Right Shoulder     Culture Result No growth at 24 hours.     Gram Stain Result Many WBCs.  No organisms seen.      Narrative:      Special Contact Isolation  RIGHT SHOULDER    GRAM STAIN [[id number]] Collected: 07/16/22 1500    Order Status: Completed Specimen: Synovial Updated: 07/17/22 1642     Significant Indicator .     Source SYNO     Site Left Knee     Gram Stain Result Many WBCs.  No organisms seen.      Narrative:      Previous comment was modified by ROLANDO at 17:15 on 07/16/22.  LEFT KNEE  ?ordered per hard req  LEFT KNEE    Fungal Smear [[id number]] Collected: 07/16/22 1500    Order Status: Completed Specimen:  Synovial Updated: 07/17/22 1642     Significant Indicator NEG     Source SYNO     Site Left Knee     Fungal Smear Results No fungal elements seen.    Narrative:      Previous comment was modified by ROLANDO at 17:15 on 07/16/22.  LEFT KNEE  ?ordered per hard req  LEFT KNEE    Acid Fast Stain [[id number]] Collected: 07/16/22 1500    Order Status: Completed Specimen: Synovial Updated: 07/17/22 1642     Significant Indicator NEG     Source SYNO     Site Left Knee     AFB Smear Results No acid fast bacilli seen.    Narrative:      Previous comment was modified by ROLANDO at 17:15 on 07/16/22.  LEFT KNEE  ?ordered per hard req  LEFT KNEE    GRAM STAIN [[id number]] Collected: 07/16/22 1500    Order Status: Completed Specimen: Synovial Updated: 07/16/22 1849     Significant Indicator .     Source SYNO     Site Right Shoulder     Gram Stain Result Many WBCs.  No organisms seen.      Narrative:      Special Contact Isolation  RIGHT SHOULDER    FLUID CULTURE W/GRAM STAIN [[id number]]     Order Status: No result Specimen: Synovial Fluid     FLUID CULTURE W/GRAM STAIN [[id number]]     Order Status: No result Specimen: Body Fluid from Synovial Fluid     AFB Culture [[id number]]     Order Status: No result Specimen: Synovial Fluid     Fungal Culture [[id number]]     Order Status: No result Specimen: Synovial Fluid     C Diff by PCR rflx Toxin [[id number]] Collected: 07/14/22 1024    Order Status: Completed Specimen: Stool Updated: 07/14/22 1203     C Diff by PCR Negative     Comment: C. difficile NOT detected by PCR.  Treatment not indicated per guidelines.  Repeat testing not indicated within 7 days.          027-NAP1-BI Presumptive Negative     Comment: Presumptive 027/NAP1/BI target DNA sequences are NOT DETECTED.       Narrative:      Special Contact Isolation  Collected By: [id number] EM ARIAS  Does this patient have risk factors for C-diff?->Yes  C-Diff Risk Factors->antibiotic exposure  ** recent stay at other hospital     "BLOOD CULTURE [[id number]] Collected: 07/13/22 1344    Order Status: Completed Specimen: Blood from Peripheral Updated: 07/14/22 0832     Significant Indicator NEG     Source BLD     Site PERIPHERAL     Culture Result No Growth  Note: Blood cultures are incubated for 5 days and  are monitored continuously.Positive blood cultures  are called to the RN and reported as soon as  they are identified.      Narrative:      Per Hospital Policy: Only change Specimen Src: to \"Line\" if  specified by physician order.  Left Hand    BLOOD CULTURE [[id number]] Collected: 07/13/22 1344    Order Status: Completed Specimen: Blood from Peripheral Updated: 07/14/22 0832     Significant Indicator NEG     Source BLD     Site PERIPHERAL     Culture Result No Growth  Note: Blood cultures are incubated for 5 days and  are monitored continuously.Positive blood cultures  are called to the RN and reported as soon as  they are identified.      Narrative:      Per Hospital Policy: Only change Specimen Src: to \"Line\" if  specified by physician order.  Right AC    Blood Culture [[id number]]  (Abnormal)  (Susceptibility) Collected: 07/10/22 1551    Order Status: Completed Specimen: Blood from Peripheral Updated: 07/13/22 0747     Significant Indicator POS     Source BLD     Site PERIPHERAL     Culture Result Growth detected by Bactec instrument. 07/11/2022  03:01      Streptococcus agalactiae (Group B)    Narrative:      CALL  [place]  [facility] tel. [phone],  CALLED  [facility] tel. [phone] 07/11/2022, 03:04, RB PERF. RESULTS CALLED TO:  RN [id number]  1 of 2 for Blood Culture x 2 sites order. Per Hospital  Policy: Only change Specimen Src: to \"Line\" if specified by  physician order.  No site indicated    Susceptibility     Streptococcus agalactiae (group b) (1)     Antibiotic Interpretation Microscan   Method Status    Daptomycin Sensitive <=0.5 mcg/mL SHERRIE Final    Clindamycin Resistant >4 mcg/mL SHERRIE Final    Penicillin Sensitive <=0.03 mcg/mL SHERRIE Final       " "            Blood Culture [[id number]]  (Abnormal) Collected: 07/10/22 1719    Order Status: Completed Specimen: Blood from Peripheral Updated: 07/13/22 0747     Significant Indicator POS     Source BLD     Site PERIPHERAL     Culture Result Growth detected by Bactec instrument.      Streptococcus agalactiae (Group B)  See previous culture for sensitivity report.      Narrative:      CALL  [place] tel. [phone],  CALLED  [facility] tel. [phone] 07/11/2022, 05:05, RB PERF. RESULTS CALLED TO:  [id number]  2 of 2 blood culture x2  Sites order. Per Hospital Policy:  Only change Specimen Src: to \"Line\" if specified by physician  order.  Right Hand    Urine Culture (NEW) [[id number]] Collected: 07/10/22 1808    Order Status: Completed Specimen: Urine Updated: 07/13/22 0726     Significant Indicator NEG     Source UR     Site -     Culture Result No growth at 48 hours.    Narrative:      Indication for culture:->Evaluation for sepsis without a  clear source of infection  Indication for culture:->Evaluation for sepsis without a          Assessment:  Active Hospital Problems    Diagnosis    • *Bacteremia [R78.81]    • Septic arthritis of shoulder, left (HCC) [M00.9]    • Left knee pain [M25.562]    • Iron deficiency anemia [D50.9]    • Acute cystitis without hematuria [N30.00]    • Hyponatremia [E87.1]    • ELIZABETH (acute kidney injury) (HCC) [N17.9]    • Hyperlipidemia [E78.5]    • Metabolic encephalopathy [G93.41]    • Atrial fibrillation (HCC) [I48.91]    • Cirrhosis (HCC) [K74.60]    • Hypertension [I10]    • Controlled type 2 diabetes mellitus with hyperglycemia, without long-term current use of insulin (HCC) [E11.65]      Interval 24 hours:      AF, O2 RA  Labs reviewed  Imaging personally reviewed both images and report.  Micro reviewed    Patient went to the OR yesterday for left shoulder I&D.  Discussed below.  Continued on antibiotics as below.    Assessment:  Patient is a 62-year-old woman with a history of cocaine and " alcohol abuse, cirrhosis, type 2 diabetes mellitus, history of left knee arthroplasty secondary to infection with prior skin grafts, and atrial fibrillation on apixaban admitted on 7/10/2022 secondary to altered mentation and hypotension.  She was found to be in acute kidney injury with a creatinine of 5.35.  Urine drug screen was positive for opiates and oxycodone.  Urinalysis had significant pyuria.  CT scan of the abdomen revealed mild diffuse wall thickening of the urinary bladder related to infection versus inflammation.  Blood cultures are now growing group B streptococcus.          Pertinent diagnoses:   Sepsis, improved  Group B streptococcus bacteremia   -Blood cultures on 7/10 positive GBS, penicillin sensitive  -Blood cultures on 7/13 are no growth to date  -TTE on 7/14 with no vegetations, trileaflet aortic valve, no significant valvular dysfunction, EF 60%  Urinary tract infection suspected due to pyuria, urine culture negative  Acute kidney injury, resolved   Prosthetic joint infection, left knee septic arthritis   -Evaluated by orthopedics with synovial fluid aspirated on 7/16, ,310 and 72% polys consistent with infection  -Orthopedics following and will confer, canceled MRI as not thought to be useful.  Plan is to continue discussion regarding surgical intervention for the left knee.  Right shoulder inflammation  -Synovial fluid aspirated on 7/16, Bloody tap, ,000, WBC 29,802, 90% polys   Left shoulder septic arthritis, subdeltoid abscess  - MRI left shoulder with large joint effusion, synovitis and pockets of gas suspicious for septic arthritis and septic bursitis.  Also with full-thickness tear of the supraspinatus tendon and portion of subscapularis tendon.  Partial tear of infraspinatus tendon.  -OR on 7/17 left shoulder arthrotomy and lavage with partial synovectomy, left shoulder incision and drainage of subdeltoid abscess, per op note: copious amounts of cloudy fluid expressed.  Cx sent.   Chronic hyponatremia   Alcoholic cirrhosis   Chronic thrombocytopenia  Remote history of cocaine and alcohol abuse   Diarrhea  -C. difficile negative on 7/14      Plan:   -Continue IV ceftriaxone 2 g daily   -Follow repeat blood cultures - NGTD  --Follow-up cultures from left knee aspirate, no growth to date  --Follow-up left shoulder OR cultures, no growth to date  --Ortho following, will likely need washout or hardware removal of left knee -Per patient this is planned for tomorrow       Dispo: Awaiting orthopedic surgical decisions regarding left knee   PICC: To be determined         Discussed with internal medicine, Dr. Bishop.  ID will follow.

## 2022-07-19 ENCOUNTER — ANESTHESIA (OUTPATIENT)
Dept: SURGERY | Facility: MEDICAL CENTER | Age: 62
DRG: 466 | End: 2022-07-19
Payer: MEDICAID

## 2022-07-19 ENCOUNTER — ANESTHESIA EVENT (OUTPATIENT)
Dept: SURGERY | Facility: MEDICAL CENTER | Age: 62
DRG: 466 | End: 2022-07-19
Payer: MEDICAID

## 2022-07-19 ENCOUNTER — APPOINTMENT (OUTPATIENT)
Dept: RADIOLOGY | Facility: MEDICAL CENTER | Age: 62
DRG: 466 | End: 2022-07-19
Attending: ORTHOPAEDIC SURGERY
Payer: MEDICAID

## 2022-07-19 PROBLEM — M00.9 SEPTIC ARTHRITIS OF KNEE, LEFT (HCC): Status: ACTIVE | Noted: 2022-07-19

## 2022-07-19 PROBLEM — M25.562 LEFT KNEE PAIN: Status: RESOLVED | Noted: 2022-07-14 | Resolved: 2022-07-19

## 2022-07-19 LAB
BASOPHILS # BLD AUTO: 0.1 % (ref 0–1.8)
BASOPHILS # BLD: 0.02 K/UL (ref 0–0.12)
EOSINOPHIL # BLD AUTO: 0.05 K/UL (ref 0–0.51)
EOSINOPHIL NFR BLD: 0.4 % (ref 0–6.9)
ERYTHROCYTE [DISTWIDTH] IN BLOOD BY AUTOMATED COUNT: 44.5 FL (ref 35.9–50)
FUNGUS SPEC FUNGUS STN: NORMAL
GLUCOSE BLD STRIP.AUTO-MCNC: 111 MG/DL (ref 65–99)
GLUCOSE BLD STRIP.AUTO-MCNC: 126 MG/DL (ref 65–99)
GLUCOSE BLD STRIP.AUTO-MCNC: 136 MG/DL (ref 65–99)
GLUCOSE BLD STRIP.AUTO-MCNC: 152 MG/DL (ref 65–99)
GLUCOSE BLD STRIP.AUTO-MCNC: 240 MG/DL (ref 65–99)
GRAM STN SPEC: NORMAL
HCT VFR BLD AUTO: 27.2 % (ref 37–47)
HGB BLD-MCNC: 8.9 G/DL (ref 12–16)
IMM GRANULOCYTES # BLD AUTO: 0.15 K/UL (ref 0–0.11)
IMM GRANULOCYTES NFR BLD AUTO: 1.1 % (ref 0–0.9)
LYMPHOCYTES # BLD AUTO: 1.15 K/UL (ref 1–4.8)
LYMPHOCYTES NFR BLD: 8.1 % (ref 22–41)
MCH RBC QN AUTO: 27.9 PG (ref 27–33)
MCHC RBC AUTO-ENTMCNC: 32.7 G/DL (ref 33.6–35)
MCV RBC AUTO: 85.3 FL (ref 81.4–97.8)
MONOCYTES # BLD AUTO: 0.71 K/UL (ref 0–0.85)
MONOCYTES NFR BLD AUTO: 5 % (ref 0–13.4)
NEUTROPHILS # BLD AUTO: 12.09 K/UL (ref 2–7.15)
NEUTROPHILS NFR BLD: 85.3 % (ref 44–72)
NRBC # BLD AUTO: 0 K/UL
NRBC BLD-RTO: 0 /100 WBC
PLATELET # BLD AUTO: 166 K/UL (ref 164–446)
PMV BLD AUTO: 9.7 FL (ref 9–12.9)
RBC # BLD AUTO: 3.19 M/UL (ref 4.2–5.4)
SIGNIFICANT IND 70042: NORMAL
SITE SITE: NORMAL
SOURCE SOURCE: NORMAL
WBC # BLD AUTO: 14.2 K/UL (ref 4.8–10.8)

## 2022-07-19 PROCEDURE — 160048 HCHG OR STATISTICAL LEVEL 1-5: Performed by: ORTHOPAEDIC SURGERY

## 2022-07-19 PROCEDURE — 700111 HCHG RX REV CODE 636 W/ 250 OVERRIDE (IP): Performed by: ANESTHESIOLOGY

## 2022-07-19 PROCEDURE — 160042 HCHG SURGERY MINUTES - EA ADDL 1 MIN LEVEL 5: Performed by: ORTHOPAEDIC SURGERY

## 2022-07-19 PROCEDURE — A9270 NON-COVERED ITEM OR SERVICE: HCPCS | Performed by: ORTHOPAEDIC SURGERY

## 2022-07-19 PROCEDURE — 87015 SPECIMEN INFECT AGNT CONCNTJ: CPT | Mod: 91

## 2022-07-19 PROCEDURE — 700102 HCHG RX REV CODE 250 W/ 637 OVERRIDE(OP): Performed by: ANESTHESIOLOGY

## 2022-07-19 PROCEDURE — A9270 NON-COVERED ITEM OR SERVICE: HCPCS | Performed by: STUDENT IN AN ORGANIZED HEALTH CARE EDUCATION/TRAINING PROGRAM

## 2022-07-19 PROCEDURE — 01402 ANES OPN/ARTH TOT KNE ARTHRP: CPT | Performed by: ANESTHESIOLOGY

## 2022-07-19 PROCEDURE — 64447 NJX AA&/STRD FEMORAL NRV IMG: CPT | Performed by: ORTHOPAEDIC SURGERY

## 2022-07-19 PROCEDURE — 110371 HCHG SHELL REV 272: Performed by: ORTHOPAEDIC SURGERY

## 2022-07-19 PROCEDURE — 99233 SBSQ HOSP IP/OBS HIGH 50: CPT | Performed by: STUDENT IN AN ORGANIZED HEALTH CARE EDUCATION/TRAINING PROGRAM

## 2022-07-19 PROCEDURE — C1713 ANCHOR/SCREW BN/BN,TIS/BN: HCPCS | Performed by: ORTHOPAEDIC SURGERY

## 2022-07-19 PROCEDURE — 700102 HCHG RX REV CODE 250 W/ 637 OVERRIDE(OP): Performed by: STUDENT IN AN ORGANIZED HEALTH CARE EDUCATION/TRAINING PROGRAM

## 2022-07-19 PROCEDURE — 82962 GLUCOSE BLOOD TEST: CPT

## 2022-07-19 PROCEDURE — 73560 X-RAY EXAM OF KNEE 1 OR 2: CPT | Mod: LT

## 2022-07-19 PROCEDURE — 87116 MYCOBACTERIA CULTURE: CPT

## 2022-07-19 PROCEDURE — 0SRD0J9 REPLACEMENT OF LEFT KNEE JOINT WITH SYNTHETIC SUBSTITUTE, CEMENTED, OPEN APPROACH: ICD-10-PCS | Performed by: ORTHOPAEDIC SURGERY

## 2022-07-19 PROCEDURE — 700101 HCHG RX REV CODE 250: Performed by: ANESTHESIOLOGY

## 2022-07-19 PROCEDURE — 0SPD0JZ REMOVAL OF SYNTHETIC SUBSTITUTE FROM LEFT KNEE JOINT, OPEN APPROACH: ICD-10-PCS | Performed by: ORTHOPAEDIC SURGERY

## 2022-07-19 PROCEDURE — 700105 HCHG RX REV CODE 258: Performed by: ANESTHESIOLOGY

## 2022-07-19 PROCEDURE — 160031 HCHG SURGERY MINUTES - 1ST 30 MINS LEVEL 5: Performed by: ORTHOPAEDIC SURGERY

## 2022-07-19 PROCEDURE — 85025 COMPLETE CBC W/AUTO DIFF WBC: CPT

## 2022-07-19 PROCEDURE — 160035 HCHG PACU - 1ST 60 MINS PHASE I: Performed by: ORTHOPAEDIC SURGERY

## 2022-07-19 PROCEDURE — 87075 CULTR BACTERIA EXCEPT BLOOD: CPT

## 2022-07-19 PROCEDURE — 87102 FUNGUS ISOLATION CULTURE: CPT | Mod: 91

## 2022-07-19 PROCEDURE — 160009 HCHG ANES TIME/MIN: Performed by: ORTHOPAEDIC SURGERY

## 2022-07-19 PROCEDURE — 502240 HCHG MISC OR SUPPLY RC 0272: Performed by: ORTHOPAEDIC SURGERY

## 2022-07-19 PROCEDURE — C1776 JOINT DEVICE (IMPLANTABLE): HCPCS | Performed by: ORTHOPAEDIC SURGERY

## 2022-07-19 PROCEDURE — A9270 NON-COVERED ITEM OR SERVICE: HCPCS | Performed by: ANESTHESIOLOGY

## 2022-07-19 PROCEDURE — 502000 HCHG MISC OR IMPLANTS RC 0278: Performed by: ORTHOPAEDIC SURGERY

## 2022-07-19 PROCEDURE — 160002 HCHG RECOVERY MINUTES (STAT): Performed by: ORTHOPAEDIC SURGERY

## 2022-07-19 PROCEDURE — 87205 SMEAR GRAM STAIN: CPT | Mod: 91

## 2022-07-19 PROCEDURE — 64447 NJX AA&/STRD FEMORAL NRV IMG: CPT | Mod: 59 | Performed by: ANESTHESIOLOGY

## 2022-07-19 PROCEDURE — 36415 COLL VENOUS BLD VENIPUNCTURE: CPT

## 2022-07-19 PROCEDURE — 700111 HCHG RX REV CODE 636 W/ 250 OVERRIDE (IP): Performed by: ORTHOPAEDIC SURGERY

## 2022-07-19 PROCEDURE — 700102 HCHG RX REV CODE 250 W/ 637 OVERRIDE(OP): Performed by: ORTHOPAEDIC SURGERY

## 2022-07-19 PROCEDURE — 160036 HCHG PACU - EA ADDL 30 MINS PHASE I: Performed by: ORTHOPAEDIC SURGERY

## 2022-07-19 PROCEDURE — 770001 HCHG ROOM/CARE - MED/SURG/GYN PRIV*

## 2022-07-19 PROCEDURE — 87206 SMEAR FLUORESCENT/ACID STAI: CPT

## 2022-07-19 PROCEDURE — 3E0T3BZ INTRODUCTION OF ANESTHETIC AGENT INTO PERIPHERAL NERVES AND PLEXI, PERCUTANEOUS APPROACH: ICD-10-PCS | Performed by: ANESTHESIOLOGY

## 2022-07-19 PROCEDURE — 87070 CULTURE OTHR SPECIMN AEROBIC: CPT | Mod: 91

## 2022-07-19 DEVICE — BONE CEMENT SIMPLEX ANTIBIO - (10/PK): Type: IMPLANTABLE DEVICE | Site: KNEE | Status: FUNCTIONAL

## 2022-07-19 DEVICE — IMPLANTABLE DEVICE: Type: IMPLANTABLE DEVICE | Site: KNEE | Status: FUNCTIONAL

## 2022-07-19 RX ORDER — ONDANSETRON 2 MG/ML
INJECTION INTRAMUSCULAR; INTRAVENOUS PRN
Status: DISCONTINUED | OUTPATIENT
Start: 2022-07-19 | End: 2022-07-19 | Stop reason: SURG

## 2022-07-19 RX ORDER — GENTAMICIN SULFATE 40 MG/ML
INJECTION, SOLUTION INTRAMUSCULAR; INTRAVENOUS
Status: DISCONTINUED | OUTPATIENT
Start: 2022-07-19 | End: 2022-07-19 | Stop reason: HOSPADM

## 2022-07-19 RX ORDER — LIDOCAINE HYDROCHLORIDE 20 MG/ML
INJECTION, SOLUTION EPIDURAL; INFILTRATION; INTRACAUDAL; PERINEURAL PRN
Status: DISCONTINUED | OUTPATIENT
Start: 2022-07-19 | End: 2022-07-19 | Stop reason: SURG

## 2022-07-19 RX ORDER — HYDROMORPHONE HYDROCHLORIDE 1 MG/ML
0.4 INJECTION, SOLUTION INTRAMUSCULAR; INTRAVENOUS; SUBCUTANEOUS
Status: DISCONTINUED | OUTPATIENT
Start: 2022-07-19 | End: 2022-07-19 | Stop reason: HOSPADM

## 2022-07-19 RX ORDER — HALOPERIDOL 5 MG/ML
1 INJECTION INTRAMUSCULAR
Status: DISCONTINUED | OUTPATIENT
Start: 2022-07-19 | End: 2022-07-19 | Stop reason: HOSPADM

## 2022-07-19 RX ORDER — HYDROMORPHONE HYDROCHLORIDE 1 MG/ML
0.2 INJECTION, SOLUTION INTRAMUSCULAR; INTRAVENOUS; SUBCUTANEOUS
Status: DISCONTINUED | OUTPATIENT
Start: 2022-07-19 | End: 2022-07-19 | Stop reason: HOSPADM

## 2022-07-19 RX ORDER — HYDRALAZINE HYDROCHLORIDE 20 MG/ML
5 INJECTION INTRAMUSCULAR; INTRAVENOUS
Status: DISCONTINUED | OUTPATIENT
Start: 2022-07-19 | End: 2022-07-19 | Stop reason: HOSPADM

## 2022-07-19 RX ORDER — TRANEXAMIC ACID 100 MG/ML
INJECTION, SOLUTION INTRAVENOUS PRN
Status: DISCONTINUED | OUTPATIENT
Start: 2022-07-19 | End: 2022-07-19 | Stop reason: SURG

## 2022-07-19 RX ORDER — METOCLOPRAMIDE HYDROCHLORIDE 5 MG/ML
INJECTION INTRAMUSCULAR; INTRAVENOUS PRN
Status: DISCONTINUED | OUTPATIENT
Start: 2022-07-19 | End: 2022-07-19 | Stop reason: SURG

## 2022-07-19 RX ORDER — OXYCODONE HCL 5 MG/5 ML
10 SOLUTION, ORAL ORAL
Status: COMPLETED | OUTPATIENT
Start: 2022-07-19 | End: 2022-07-19

## 2022-07-19 RX ORDER — METOPROLOL TARTRATE 1 MG/ML
INJECTION, SOLUTION INTRAVENOUS PRN
Status: DISCONTINUED | OUTPATIENT
Start: 2022-07-19 | End: 2022-07-19 | Stop reason: SURG

## 2022-07-19 RX ORDER — VANCOMYCIN HYDROCHLORIDE 1 G/20ML
INJECTION, POWDER, LYOPHILIZED, FOR SOLUTION INTRAVENOUS
Status: COMPLETED | OUTPATIENT
Start: 2022-07-19 | End: 2022-07-19

## 2022-07-19 RX ORDER — DEXAMETHASONE SODIUM PHOSPHATE 4 MG/ML
INJECTION, SOLUTION INTRA-ARTICULAR; INTRALESIONAL; INTRAMUSCULAR; INTRAVENOUS; SOFT TISSUE PRN
Status: DISCONTINUED | OUTPATIENT
Start: 2022-07-19 | End: 2022-07-19 | Stop reason: SURG

## 2022-07-19 RX ORDER — KETOROLAC TROMETHAMINE 30 MG/ML
INJECTION, SOLUTION INTRAMUSCULAR; INTRAVENOUS PRN
Status: DISCONTINUED | OUTPATIENT
Start: 2022-07-19 | End: 2022-07-19 | Stop reason: SURG

## 2022-07-19 RX ORDER — ROPIVACAINE HYDROCHLORIDE 5 MG/ML
INJECTION, SOLUTION EPIDURAL; INFILTRATION; PERINEURAL PRN
Status: DISCONTINUED | OUTPATIENT
Start: 2022-07-19 | End: 2022-07-19 | Stop reason: SURG

## 2022-07-19 RX ORDER — ONDANSETRON 2 MG/ML
4 INJECTION INTRAMUSCULAR; INTRAVENOUS
Status: DISCONTINUED | OUTPATIENT
Start: 2022-07-19 | End: 2022-07-19 | Stop reason: HOSPADM

## 2022-07-19 RX ORDER — ALBUTEROL SULFATE 2.5 MG/3ML
2.5 SOLUTION RESPIRATORY (INHALATION)
Status: DISCONTINUED | OUTPATIENT
Start: 2022-07-19 | End: 2022-07-19 | Stop reason: HOSPADM

## 2022-07-19 RX ORDER — LABETALOL HYDROCHLORIDE 5 MG/ML
5 INJECTION, SOLUTION INTRAVENOUS
Status: DISCONTINUED | OUTPATIENT
Start: 2022-07-19 | End: 2022-07-19 | Stop reason: HOSPADM

## 2022-07-19 RX ORDER — HYDROMORPHONE HYDROCHLORIDE 1 MG/ML
0.1 INJECTION, SOLUTION INTRAMUSCULAR; INTRAVENOUS; SUBCUTANEOUS
Status: DISCONTINUED | OUTPATIENT
Start: 2022-07-19 | End: 2022-07-19 | Stop reason: HOSPADM

## 2022-07-19 RX ORDER — MAGNESIUM CARB/ALUMINUM HYDROX 105-160MG
TABLET,CHEWABLE ORAL
Status: DISCONTINUED | OUTPATIENT
Start: 2022-07-19 | End: 2022-07-19 | Stop reason: HOSPADM

## 2022-07-19 RX ORDER — OXYCODONE HCL 5 MG/5 ML
5 SOLUTION, ORAL ORAL
Status: COMPLETED | OUTPATIENT
Start: 2022-07-19 | End: 2022-07-19

## 2022-07-19 RX ORDER — SODIUM CHLORIDE, SODIUM LACTATE, POTASSIUM CHLORIDE, CALCIUM CHLORIDE 600; 310; 30; 20 MG/100ML; MG/100ML; MG/100ML; MG/100ML
INJECTION, SOLUTION INTRAVENOUS
Status: DISCONTINUED | OUTPATIENT
Start: 2022-07-19 | End: 2022-07-19 | Stop reason: SURG

## 2022-07-19 RX ORDER — MEPERIDINE HYDROCHLORIDE 25 MG/ML
12.5 INJECTION INTRAMUSCULAR; INTRAVENOUS; SUBCUTANEOUS
Status: DISCONTINUED | OUTPATIENT
Start: 2022-07-19 | End: 2022-07-19 | Stop reason: HOSPADM

## 2022-07-19 RX ORDER — CEFAZOLIN SODIUM 1 G/3ML
INJECTION, POWDER, FOR SOLUTION INTRAMUSCULAR; INTRAVENOUS PRN
Status: DISCONTINUED | OUTPATIENT
Start: 2022-07-19 | End: 2022-07-19 | Stop reason: SURG

## 2022-07-19 RX ORDER — ROCURONIUM BROMIDE 10 MG/ML
INJECTION, SOLUTION INTRAVENOUS PRN
Status: DISCONTINUED | OUTPATIENT
Start: 2022-07-19 | End: 2022-07-19 | Stop reason: SURG

## 2022-07-19 RX ORDER — PHENYLEPHRINE HYDROCHLORIDE 10 MG/ML
INJECTION, SOLUTION INTRAMUSCULAR; INTRAVENOUS; SUBCUTANEOUS PRN
Status: DISCONTINUED | OUTPATIENT
Start: 2022-07-19 | End: 2022-07-19 | Stop reason: SURG

## 2022-07-19 RX ORDER — SODIUM CHLORIDE 9 MG/ML
INJECTION, SOLUTION INTRAVENOUS CONTINUOUS
Status: DISCONTINUED | OUTPATIENT
Start: 2022-07-19 | End: 2022-07-19 | Stop reason: HOSPADM

## 2022-07-19 RX ORDER — DEXTROSE MONOHYDRATE 25 G/50ML
12.5 INJECTION, SOLUTION INTRAVENOUS
Status: DISCONTINUED | OUTPATIENT
Start: 2022-07-19 | End: 2022-07-19 | Stop reason: HOSPADM

## 2022-07-19 RX ADMIN — KETOROLAC TROMETHAMINE 30 MG: 30 INJECTION, SOLUTION INTRAMUSCULAR at 17:45

## 2022-07-19 RX ADMIN — OXYCODONE 5 MG: 5 TABLET ORAL at 22:34

## 2022-07-19 RX ADMIN — OXYCODONE 5 MG: 5 TABLET ORAL at 08:48

## 2022-07-19 RX ADMIN — FENTANYL CITRATE 25 MCG: 50 INJECTION, SOLUTION INTRAMUSCULAR; INTRAVENOUS at 18:45

## 2022-07-19 RX ADMIN — PROPOFOL 200 MG: 10 INJECTION, EMULSION INTRAVENOUS at 15:34

## 2022-07-19 RX ADMIN — METOPROLOL TARTRATE 2 MG: 5 INJECTION, SOLUTION INTRAVENOUS at 17:59

## 2022-07-19 RX ADMIN — ONDANSETRON 4 MG: 2 INJECTION INTRAMUSCULAR; INTRAVENOUS at 17:45

## 2022-07-19 RX ADMIN — MIDAZOLAM 2 MG: 1 INJECTION INTRAMUSCULAR; INTRAVENOUS at 15:23

## 2022-07-19 RX ADMIN — INSULIN HUMAN 6 UNITS: 100 INJECTION, SOLUTION PARENTERAL at 22:28

## 2022-07-19 RX ADMIN — LACTULOSE 15 ML: 20 SOLUTION ORAL at 05:22

## 2022-07-19 RX ADMIN — OXYCODONE 5 MG: 5 TABLET ORAL at 13:36

## 2022-07-19 RX ADMIN — ROCURONIUM BROMIDE 50 MG: 10 INJECTION, SOLUTION INTRAVENOUS at 15:34

## 2022-07-19 RX ADMIN — METOPROLOL TARTRATE 1 MG: 5 INJECTION, SOLUTION INTRAVENOUS at 17:56

## 2022-07-19 RX ADMIN — CEFAZOLIN 2 G: 330 INJECTION, POWDER, FOR SOLUTION INTRAMUSCULAR; INTRAVENOUS at 15:34

## 2022-07-19 RX ADMIN — ATORVASTATIN CALCIUM 20 MG: 20 TABLET, FILM COATED ORAL at 22:32

## 2022-07-19 RX ADMIN — DEXAMETHASONE SODIUM PHOSPHATE 4 MG: 4 INJECTION, SOLUTION INTRA-ARTICULAR; INTRALESIONAL; INTRAMUSCULAR; INTRAVENOUS; SOFT TISSUE at 15:32

## 2022-07-19 RX ADMIN — SODIUM CHLORIDE, POTASSIUM CHLORIDE, SODIUM LACTATE AND CALCIUM CHLORIDE: 600; 310; 30; 20 INJECTION, SOLUTION INTRAVENOUS at 15:23

## 2022-07-19 RX ADMIN — LIDOCAINE HYDROCHLORIDE 100 MG: 20 INJECTION, SOLUTION EPIDURAL; INFILTRATION; INTRACAUDAL at 15:34

## 2022-07-19 RX ADMIN — OXYCODONE 5 MG: 5 TABLET ORAL at 03:21

## 2022-07-19 RX ADMIN — FENTANYL CITRATE 50 MCG: 50 INJECTION, SOLUTION INTRAMUSCULAR; INTRAVENOUS at 16:04

## 2022-07-19 RX ADMIN — FENTANYL CITRATE 50 MCG: 50 INJECTION, SOLUTION INTRAMUSCULAR; INTRAVENOUS at 17:46

## 2022-07-19 RX ADMIN — PHENYLEPHRINE HYDROCHLORIDE 200 MCG: 10 INJECTION INTRAVENOUS at 15:42

## 2022-07-19 RX ADMIN — FENTANYL CITRATE 50 MCG: 50 INJECTION, SOLUTION INTRAMUSCULAR; INTRAVENOUS at 16:12

## 2022-07-19 RX ADMIN — METOCLOPRAMIDE 10 MG: 5 INJECTION, SOLUTION INTRAMUSCULAR; INTRAVENOUS at 17:45

## 2022-07-19 RX ADMIN — TRANEXAMIC ACID 1000 MG: 100 INJECTION, SOLUTION INTRAVENOUS at 17:45

## 2022-07-19 RX ADMIN — TRANEXAMIC ACID 1000 MG: 100 INJECTION, SOLUTION INTRAVENOUS at 15:34

## 2022-07-19 RX ADMIN — ROPIVACAINE HYDROCHLORIDE 20 ML: 5 INJECTION, SOLUTION EPIDURAL; INFILTRATION; PERINEURAL at 15:32

## 2022-07-19 RX ADMIN — METOPROLOL TARTRATE 1 MG: 5 INJECTION, SOLUTION INTRAVENOUS at 17:53

## 2022-07-19 RX ADMIN — FENTANYL CITRATE 100 MCG: 50 INJECTION, SOLUTION INTRAMUSCULAR; INTRAVENOUS at 17:55

## 2022-07-19 RX ADMIN — OXYCODONE HYDROCHLORIDE 5 MG: 5 SOLUTION ORAL at 18:44

## 2022-07-19 RX ADMIN — METOPROLOL TARTRATE 1 MG: 5 INJECTION, SOLUTION INTRAVENOUS at 17:50

## 2022-07-19 RX ADMIN — FENTANYL CITRATE 50 MCG: 50 INJECTION, SOLUTION INTRAMUSCULAR; INTRAVENOUS at 15:23

## 2022-07-19 ASSESSMENT — PATIENT HEALTH QUESTIONNAIRE - PHQ9
1. LITTLE INTEREST OR PLEASURE IN DOING THINGS: NOT AT ALL
2. FEELING DOWN, DEPRESSED, IRRITABLE, OR HOPELESS: NOT AT ALL
SUM OF ALL RESPONSES TO PHQ9 QUESTIONS 1 AND 2: 0

## 2022-07-19 ASSESSMENT — COGNITIVE AND FUNCTIONAL STATUS - GENERAL
DRESSING REGULAR UPPER BODY CLOTHING: A LITTLE
WALKING IN HOSPITAL ROOM: TOTAL
DRESSING REGULAR LOWER BODY CLOTHING: A LOT
MOBILITY SCORE: 10
MOVING FROM LYING ON BACK TO SITTING ON SIDE OF FLAT BED: UNABLE
STANDING UP FROM CHAIR USING ARMS: A LOT
DAILY ACTIVITIY SCORE: 17
TOILETING: A LOT
HELP NEEDED FOR BATHING: A LOT
SUGGESTED CMS G CODE MODIFIER MOBILITY: CL
TURNING FROM BACK TO SIDE WHILE IN FLAT BAD: A LOT
SUGGESTED CMS G CODE MODIFIER DAILY ACTIVITY: CK
MOVING TO AND FROM BED TO CHAIR: A LOT
CLIMB 3 TO 5 STEPS WITH RAILING: A LOT

## 2022-07-19 ASSESSMENT — ENCOUNTER SYMPTOMS
VOMITING: 0
PALPITATIONS: 0
SORE THROAT: 0
SHORTNESS OF BREATH: 0
BLURRED VISION: 0
DOUBLE VISION: 0
ABDOMINAL PAIN: 1
DIZZINESS: 0
MYALGIAS: 1
HEADACHES: 0
NAUSEA: 0
COUGH: 0
LOSS OF CONSCIOUSNESS: 0
CHILLS: 0
DIARRHEA: 0
FEVER: 0
BACK PAIN: 1

## 2022-07-19 ASSESSMENT — PAIN DESCRIPTION - PAIN TYPE
TYPE: SURGICAL PAIN

## 2022-07-19 NOTE — THERAPY
Occupational Therapy Contact Note      Patient Name: April Alicia  Age:  62 y.o., Sex:  female  Medical Record #: [id number]  Today's Date: 7/19/2022 07/19/22 0778   Interdisciplinary Plan of Care Collaboration   Collaboration Comments Per EMR plans for L knee resection and spacer today. Will HOLD OT session and re approach post op as able and appropriate, FRANK

## 2022-07-19 NOTE — PROGRESS NOTES
Subjective:   April Alicia is a 62-year-old female who has history of alcohol abuse with cirrhosis, asthma, A. fib on apixaban, diabetes type 2, hypertension who was admitted with urosepsis as well as septic shoulder now status post irrigation and debridement and newly diagnosed septic left total knee arthroplasty.  She was admitted with urosepsis and found to have positive blood cultures with strep species started on antibiotics.  She was found to have a very painful shoulder which was treated with irrigation and debridement in the operating room.  She was then noted to have left knee pain and her left knee was aspirated.  It demonstrated 103,000 cells with 72% polys.    She has been indicated for L knee resection with antibiotic spacer and is eager to get to the OR today. Shoulder pain improved since yesterday.     Objective:   General: AO x4  Eyes: non-icteric  Breathing: nonlabored  MSK:   Evaluation of the right knee demonstrates significant pain with any range of motion of the knee.  She is able to tolerate 15 to 30 degrees of motion of the knee.  She has 3+ effusion with erythema over the anterior aspect of her knee.  She is able to demonstrate EHL, FHL, tib and gastrocsoleus.  She has grossly intact to sensation in L2-S1 with diminished sensation up to the level of the knees which is her baseline.  She has a palpable pulse in her DP.     She has no pain with range of motion of the contralateral knee ankle or hip.  She does have significant range of motion pain with her left shoulder        Labs:   Lab Results   Component Value Date/Time    WBC 14.2 (H) 07/19/2022 01:05 AM    RBC 3.19 (L) 07/19/2022 01:05 AM    HEMOGLOBIN 8.9 (L) 07/19/2022 01:05 AM    HEMATOCRIT 27.2 (L) 07/19/2022 01:05 AM    MCV 85.3 07/19/2022 01:05 AM    MCH 27.9 07/19/2022 01:05 AM    MCHC 32.7 (L) 07/19/2022 01:05 AM    MPV 9.7 07/19/2022 01:05 AM    NEUTSPOLYS 85.30 (H) 07/19/2022 01:05 AM    LYMPHOCYTES 8.10 (L) 07/19/2022  01:05 AM    MONOCYTES 5.00 07/19/2022 01:05 AM    EOSINOPHILS 0.40 07/19/2022 01:05 AM    BASOPHILS 0.10 07/19/2022 01:05 AM    HYPOCHROMIA 1+ 03/12/2009 06:50 PM    ANISOCYTOSIS 1+ 06/11/2006 05:30 AM        April Murcia is a 62-year-old female with bacteremia status post left shoulder irrigation and debridement and now with recently diagnosed infection in her left total knee arthroplasty.  Given her complex history with prior history of I&D and polyexchange of that knee, continued pain and lucency about the tibial component, I am concerned for chronic infection.  I discussed with the patient chronic infection versus acute infection.  If this were an acute infection she may be a candidate for irrigation and debridement and polyexchange.  However given the concerns we have for chronic infection, she is a candidate for irrigation and debridement and resection with antibiotic spacer.  I discussed with her that given her prior failure and comorbidities as well as lab findings she has a less than 50% chance of success of irrigation and debridement and polyexchange.  She certainly still has a risk of failure of antibiotic spacer, but higher chances of eradication of her infection, particularly with her continued bacteremia.  Patient understood the options and wished to proceed with resection and antibiotic spacer. We discussed the risks of surgery including recurrent infection, fracture, pain, nerve damage, need for repeat surgery, and risks of anesthesia including stroke, heart attack and death and she wished to proceed with surgery.   1. L knee resection and antibiotic spacer today  2. NPO for OR today

## 2022-07-19 NOTE — CARE PLAN
The patient is Stable - Low risk of patient condition declining or worsening    Shift Goals  Clinical Goals: pain mgmt, I&D of left knee  Patient Goals: pain mgmt  Family Goals: EDMUND    Progress made toward(s) clinical / shift goals:    Problem: Knowledge Deficit - Standard  Goal: Patient and family/care givers will demonstrate understanding of plan of care, disease process/condition, diagnostic tests and medications  Outcome: Progressing     Problem: Pain - Standard  Goal: Alleviation of pain or a reduction in pain to the patient’s comfort goal  Outcome: Progressing     Problem: Skin Integrity  Goal: Skin integrity is maintained or improved  Outcome: Progressing     Problem: Fall Risk  Goal: Patient will remain free from falls  Outcome: Progressing

## 2022-07-19 NOTE — ANESTHESIA PREPROCEDURE EVALUATION
Case: [id number] Anesthesia Start Date/Time: 07/19/22 1523    Procedure: REVISION, TOTAL ARTHROPLASTY, KNEE, ALL COMPONENTS - ANTIBIOTIC SPACER (Left )    Location: [facility] OR  / SURGERY [facility]    Surgeons: Wild Luz M.D.      Chronic anticoagulation  Emphysema  Illicit substance use    Relevant Problems   NEURO   (positive) History of hypertension      CARDIAC   (positive) Atrial fibrillation (HCC)   (positive) Hypertension         (positive) Cirrhosis, alcoholic (HCC)      Other   (positive) Diabetic polyneuropathy associated with type 2 diabetes mellitus (HCC)   (positive) Hyperlipidemia   (positive) Sepsis (HCC)   (positive) Septic arthritis of knee, left (HCC)   (positive) Septic arthritis of shoulder, left (HCC)       Physical Exam    Airway   Mallampati: II  TM distance: >3 FB  Neck ROM: full       Cardiovascular - normal exam  Rhythm: irregular  Rate: normal  (-) murmur     Dental   (+) upper dentures, lower dentures           Pulmonary - normal exam  Breath sounds clear to auscultation     Abdominal    Neurological - normal exam                 Anesthesia Plan    ASA 3   ASA physical status 3 criteria: COPD and alcohol and/or substance dependence or abuse    Plan - general and peripheral nerve block     Peripheral nerve block will be post-op pain control  Airway plan will be ETT          Induction: intravenous    Postoperative Plan: Postoperative administration of opioids is intended.    Pertinent diagnostic labs and testing reviewed    Informed Consent:    Anesthetic plan and risks discussed with patient.

## 2022-07-19 NOTE — ASSESSMENT & PLAN NOTE
History of left knee replacement 2017, which was complicated with infection 2 weeks later  Patient had recurrent left knee infection last year which was complicated with bacteremia and she was hospitalized at the [place] [facility] when she visited her son     Xray notes New lucency adjacent to the tibial component of arthroplasty suspicious for loosening or infection.   Orthopedics consulted, s/p L knee arthrocentesis 7/16 c/w infection.   s/p L knee resection and antibiotic spacer 7/19  ID rec cont daptomycin IV daily end 8/30/2022 and then follow up in ID clinic at the end of antibiotics course. Need potential aspiration of the knee to ensure no infection prior to replacing the hardware.   TTWB LLE per ortho.

## 2022-07-19 NOTE — PROGRESS NOTES
Hospital Medicine Daily Progress Note    Date of Service  7/19/2022    Chief Complaint  April Alicia is a 62 y.o. female admitted 7/10/2022 with hypotension, AMS    Hospital Course  61yo PMHx coccaine and ETOH abuse though sober x 8 years per her report, cirrhosis, asthma, AFib on apixaban, DM, HTN. Took an unknown narcotic.  In ED responded to narcan, labs showing Na 121, Cl 89, Creat 5.35, CXR showing cardiomegally.  Admitted to the floor with AMS, HypoNa, dehydration, sepsis from urinary source.  Subsequent blood cultures + Strep Species    Bacteremia with group B streptococcal bacteremia. ID consulted. -on Rocephin, repeated blood culture NTD. TTE showed  EF of 60%, negative for vegetation.     ELIZABETH-resolved    Hx of L knee arthroplasty, left knee pain and swelling. Xray notes New lucency adjacent to the tibial component of arthroplasty suspicious for loosening or infection. Orthopedics consulted, s/p L knee arthrocentesis 7/16 c/w infection. Ortho plans L knee resection and antibiotic spacer 7/19     MRI left shoulder showed possible septic arthritis/bursitis. s/p Left shoulder arthrotomy and lavage for infection with partial   Synovectomy. Left shoulder incision and drainage of the subdeltoid abscess 7/17.    Interval Problem Update  No acute overnight events.   S/p L shoulder drainage on 7/17. Culture negative to date.   Ortho plans L knee surgery later today.     I have discussed this patient's plan of care and discharge plan at IDT rounds today with Case Management, Nursing, Nursing leadership, and other members of the IDT team.    Consultants/Specialty  ID  ortho    Code Status  Full Code    Disposition  Patient is not medically cleared for discharge.   Anticipate discharge to to skilled nursing facility.  I have placed the appropriate orders for post-discharge needs.    Review of Systems  Review of Systems   Constitutional: Positive for malaise/fatigue. Negative for chills and fever.   HENT:  Negative for nosebleeds and sore throat.    Eyes: Negative for blurred vision and double vision.   Respiratory: Negative for cough and shortness of breath.    Cardiovascular: Negative for chest pain, palpitations and leg swelling.   Gastrointestinal: Positive for abdominal pain. Negative for diarrhea, nausea and vomiting.   Genitourinary: Negative for dysuria.   Musculoskeletal: Positive for back pain, joint pain and myalgias.        L arm pain; left knee pain and swelling    Skin: Negative for rash.   Neurological: Negative for dizziness, loss of consciousness and headaches.        Physical Exam  Temp:  [36.5 °C (97.7 °F)-36.7 °C (98.1 °F)] 36.7 °C (98.1 °F)  Pulse:  [66-76] 67  Resp:  [18] 18  BP: ()/(55-71) 102/71  SpO2:  [93 %-98 %] 97 %    Physical Exam  Vitals reviewed.   Constitutional:       General: She is not in acute distress.     Appearance: She is well-developed. She is ill-appearing. She is not diaphoretic.   HENT:      Head: Normocephalic and atraumatic.      Mouth/Throat:      Mouth: Mucous membranes are moist.   Eyes:      Extraocular Movements: Extraocular movements intact.      Conjunctiva/sclera: Conjunctivae normal.   Neck:      Vascular: No JVD.   Cardiovascular:      Rate and Rhythm: Normal rate and regular rhythm.      Pulses: Normal pulses.   Pulmonary:      Effort: Pulmonary effort is normal. No respiratory distress.      Breath sounds: No stridor. No wheezing or rales.   Abdominal:      Palpations: Abdomen is soft.      Tenderness: There is no abdominal tenderness. There is no guarding or rebound.   Musculoskeletal:         General: Swelling and tenderness present.      Cervical back: Neck supple. No rigidity or tenderness.      Right lower leg: No edema.      Left lower leg: No edema.      Comments: Left knee swelling, tenderness  Left shoulder clean dressing, SONY drain in place   Skin:     General: Skin is warm and dry.      Comments: BL LE skin graft with pigmentation.  No erythema  or swelling.    Neurological:      Mental Status: She is alert and oriented to person, place, and time. Mental status is at baseline.   Psychiatric:      Comments: Anxious         Fluids    Intake/Output Summary (Last 24 hours) at 7/19/2022 1111  Last data filed at 7/19/2022 0800  Gross per 24 hour   Intake --   Output 20 ml   Net -20 ml       Laboratory  Recent Labs     07/17/22  0140 07/18/22  0325 07/19/22  0105   WBC 10.7 10.8 14.2*   RBC 3.90* 3.19* 3.19*   HEMOGLOBIN 11.1* 9.0* 8.9*   HEMATOCRIT 33.7* 27.7* 27.2*   MCV 86.4 86.8 85.3   MCH 28.5 28.2 27.9   MCHC 32.9* 32.5* 32.7*   RDW 46.5 46.5 44.5   PLATELETCT 126* 108* 166   MPV 9.1 10.1 9.7     Recent Labs     07/17/22  0140 07/18/22  0325   SODIUM 132* 133*   POTASSIUM 4.4 4.7   CHLORIDE 107 107   CO2 18* 18*   GLUCOSE 90 294*   BUN 25* 30*   CREATININE 0.68 0.92   CALCIUM 7.8* 7.2*     Recent Labs     07/18/22  1020   INR 1.49*               Imaging  MR-SHOULDER-W/O LEFT   Final Result      1.  Large joint effusion with a large amount of fluid seen extending into the subacromial/subdeltoid bursa with synovitis and small low signal foci within the bursa consistent with pockets of gas. These findings are very suspicious for septic arthritis    and septic bursitis.      2.  Complete full-thickness tear of the supraspinatus tendon with retraction to the bony glenoid.      3.  Full-thickness tear portion of the subscapularis tendon.      4.  Partial-thickness tear of the articular surface fibers of the infraspinatus tendon.      5.  Prominent muscle edema and atrophy involving the rotator cuff as well as all the muscles surrounding the shoulder which may represent myositis.      6.  Osteoarthritis of the glenohumeral joint.      7.  Diffuse tearing of the glenoid labrum.      8.  Subluxation of the long head of the biceps tendon medially from the bicipital groove with marked thinning of the intra-articular portion consistent with tendinosis.      9.   Extensive soft tissue edema to include fat and muscle involving all of the imaged structures around the shoulder likely representing cellulitis.      10.  Widening of the acromioclavicular joint with fragmentation of the distal clavicle consistent with old posttraumatic change versus surgical change.      EB-AFOHGDU-8 VIEW   Final Result      Nonobstructive bowel gas pattern.      EC-ECHOCARDIOGRAM COMPLETE W/O CONT   Final Result      DX-CHEST-PORTABLE (1 VIEW)   Final Result      1.  Interstitial infiltrates versus edema.   2.  Cardiomegaly.      DX-KNEE 2- RIGHT   Final Result      1.  Negative for fracture or malalignment      2.  Right knee arthroplasty appears within normal limits      DX-KNEE 2- LEFT   Final Result      1.  New lucency adjacent to the tibial component of arthroplasty suspicious for loosening or infection      2.  No other finding      US-EXTREMITY VENOUS UPPER UNILAT LEFT   Final Result      US-RENAL   Final Result      1.  Unremarkable kidneys.   2.  Limited evaluation of the bladder.      CT-ABDOMEN-PELVIS W/O   Final Result         Limited exam due to lack of IV contrast.         1. Mild diffuse wall thickening of the urinary bladder could relate to infection or inflammation. Correlate with UA. No hydronephrosis.   2. Nonspecific mildly prominent fluid-filled proximal small bowel in the left abdomen. This could be seen with enteritis, focal ileus or early obstruction.   3. Cirrhotic liver with portal hypertension and splenomegaly. Wall thickening of the ascending colon could relate to portal hypertension.   4. No ascites.      DX-SHOULDER 2+ LEFT   Final Result      1.  No evidence of acute fracture or dislocation.      2.  Old posttraumatic changes of the distal clavicle.      DX-CHEST-PORTABLE (1 VIEW)   Final Result      Cardiomegaly.           Assessment/Plan  * Bacteremia  Assessment & Plan  2 bottles with group B strep   Patient reported history of severe bacteremia a year ago after  knee surgery last year, which required skin grafting of both lower extremity and was hospitalized at [place] when she visited her son there  Repeat blood cultures NTD    Cont Rocephin  Check TTE negative for vegetation  ID following      Septic arthritis of knee, left ([facility])  Assessment & Plan  History of left knee replacement 2017, which was complicated with infection 2 weeks later  Patient had recurrent left knee infection last year which was complicated with bacteremia and she was hospitalized at the [place] hospital when she visited her son     Xray notes New lucency adjacent to the tibial component of arthroplasty suspicious for loosening or infection.   Orthopedics consulted, s/p L knee arthrocentesis 7/16 c/w infection.   Ortho plans L knee resection and antibiotic spacer 7/19  Cont Ceftriaxone  ID and Ortho following    Septic arthritis of shoulder, left ([facility])  Assessment & Plan  MRI left shoulder showed septic arthritis/subdeltoid abscess    Ortho consulted,  s/p Left shoulder arthrotomy and lavage for infection with partial synovectomy. Left shoulder incision and drainage of the subdeltoid abscess 7/17.  SONY drain in place  fluid culture NTD  On Rocephin, ID following    Iron deficiency anemia  Assessment & Plan  Iron study consistent with iron deficiency  Hgb 10.7 at admission, stable    holding IV iron replacement given ongoing infection    Hyponatremia  Assessment & Plan  123 on admission  High SG on UA: probable hypovolemic and alcohol use  Cont to monitor    Resolved    Acute cystitis without hematuria  Assessment & Plan  On Rocephin for bacteremia    Metabolic encephalopathy  Assessment & Plan  Likely multifactorial in conjunction with narcotic use  S/p Narcan treatment, improved  Start lacutlose  Cont to treat infection     resolved      Hyperlipidemia- (present on admission)  Assessment & Plan  Continue Lipitor    ELIZABETH (acute kidney injury) ([facility])- (present on admission)  Assessment & Plan  Likely  secondary dehydration  4.7 on admission   Good UOP  DC IVFs  Cont to monitor  Renally dose medications as appropriate    Resolved    Atrial fibrillation (HCC)- (present on admission)  Assessment & Plan  On apixaban and dronedarone as outpt    Cirrhosis (HCC)- (present on admission)  Assessment & Plan  Secondary to ETOH  Lactulose  monitor    Hypertension- (present on admission)  Assessment & Plan  Resume the home losartan    Controlled type 2 diabetes mellitus with hyperglycemia, without long-term current use of insulin (HCC)- (present on admission)  Assessment & Plan  Sliding-scale insulin, diabetic diet  A1c 5.7%         VTE prophylaxis: SCDs/TEDs    I have performed a physical exam and reviewed and updated ROS and Plan today (7/19/2022). In review of yesterday's note (7/18/2022), there are no changes except as documented above.

## 2022-07-19 NOTE — CARE PLAN
The patient is Stable - Low risk of patient condition declining or worsening    Shift Goals  Clinical Goals: Pain management  Patient Goals: pain mgmt  Family Goals: EDMUND    Progress made toward(s) clinical / shift goals:  Pain medication prn, monitor pain, repositioning, ice/heat prn, surgery today    Patient is not progressing towards the following goals:

## 2022-07-19 NOTE — ANESTHESIA PROCEDURE NOTES
Peripheral Block    Date/Time: 7/19/2022 3:31 PM  Performed by: Matthieu Curtis M.D.  Authorized by: Matthieu Curtis M.D.     Patient Location:  OR  Start Time:  7/19/2022 3:31 PM  End Time:  7/19/2022 3:33 PM  Reason for Block: at surgeon's request and post-op pain management ONLY    patient identified, IV checked, site marked, risks and benefits discussed, surgical consent, monitors and equipment checked, pre-op evaluation and timeout performed    Patient Position:  Supine  Prep: ChloraPrep    Monitoring:  Heart rate, continuous pulse ox and cardiac monitor  Block Region:  Lower Extremity  Lower Extremity - Block Type:  Selective FEMORAL nerve block at the Adductor Canal    Laterality:  Left  Procedures: ultrasound guided  Image captured, interpreted and electronically stored.  Local Infiltration:  Lidocaine  Strength:  1 %  Dose:  3 ml  Block Type:  Single-shot  Needle Length:  100mm  Needle Gauge:  21 G  Needle Localization:  Ultrasound guidance  Injection Assessment:  Negative aspiration for heme, no paresthesia on injection, incremental injection and local visualized surrounding nerve on ultrasound  Evidence of intravascular injection: No

## 2022-07-20 ENCOUNTER — APPOINTMENT (OUTPATIENT)
Dept: RADIOLOGY | Facility: MEDICAL CENTER | Age: 62
DRG: 466 | End: 2022-07-20
Attending: STUDENT IN AN ORGANIZED HEALTH CARE EDUCATION/TRAINING PROGRAM
Payer: MEDICAID

## 2022-07-20 LAB
ANION GAP SERPL CALC-SCNC: 7 MMOL/L (ref 7–16)
BACTERIA FLD AEROBE CULT: NORMAL
BACTERIA FLD AEROBE CULT: NORMAL
BACTERIA WND AEROBE CULT: NORMAL
BACTERIA WND AEROBE CULT: NORMAL
BUN SERPL-MCNC: 44 MG/DL (ref 8–22)
CALCIUM SERPL-MCNC: 7.3 MG/DL (ref 8.5–10.5)
CHLORIDE SERPL-SCNC: 108 MMOL/L (ref 96–112)
CO2 SERPL-SCNC: 17 MMOL/L (ref 20–33)
CREAT SERPL-MCNC: 1.06 MG/DL (ref 0.5–1.4)
ERYTHROCYTE [DISTWIDTH] IN BLOOD BY AUTOMATED COUNT: 47.5 FL (ref 35.9–50)
GFR SERPLBLD CREATININE-BSD FMLA CKD-EPI: 59 ML/MIN/1.73 M 2
GLUCOSE BLD STRIP.AUTO-MCNC: 152 MG/DL (ref 65–99)
GLUCOSE BLD STRIP.AUTO-MCNC: 193 MG/DL (ref 65–99)
GLUCOSE BLD STRIP.AUTO-MCNC: 196 MG/DL (ref 65–99)
GLUCOSE BLD STRIP.AUTO-MCNC: 241 MG/DL (ref 65–99)
GLUCOSE SERPL-MCNC: 297 MG/DL (ref 65–99)
GRAM STN SPEC: NORMAL
HCT VFR BLD AUTO: 28.2 % (ref 37–47)
HGB BLD-MCNC: 8.9 G/DL (ref 12–16)
MCH RBC QN AUTO: 28 PG (ref 27–33)
MCHC RBC AUTO-ENTMCNC: 31.6 G/DL (ref 33.6–35)
MCV RBC AUTO: 88.7 FL (ref 81.4–97.8)
PLATELET # BLD AUTO: 168 K/UL (ref 164–446)
PMV BLD AUTO: 9.1 FL (ref 9–12.9)
POTASSIUM SERPL-SCNC: 5.5 MMOL/L (ref 3.6–5.5)
RBC # BLD AUTO: 3.18 M/UL (ref 4.2–5.4)
RHODAMINE-AURAMINE STN SPEC: NORMAL
SIGNIFICANT IND 70042: NORMAL
SITE SITE: NORMAL
SODIUM SERPL-SCNC: 132 MMOL/L (ref 135–145)
SOURCE SOURCE: NORMAL
WBC # BLD AUTO: 11.5 K/UL (ref 4.8–10.8)

## 2022-07-20 PROCEDURE — 99232 SBSQ HOSP IP/OBS MODERATE 35: CPT | Performed by: STUDENT IN AN ORGANIZED HEALTH CARE EDUCATION/TRAINING PROGRAM

## 2022-07-20 PROCEDURE — 97535 SELF CARE MNGMENT TRAINING: CPT

## 2022-07-20 PROCEDURE — 80048 BASIC METABOLIC PNL TOTAL CA: CPT

## 2022-07-20 PROCEDURE — A9270 NON-COVERED ITEM OR SERVICE: HCPCS | Performed by: HOSPITALIST

## 2022-07-20 PROCEDURE — 700102 HCHG RX REV CODE 250 W/ 637 OVERRIDE(OP): Performed by: STUDENT IN AN ORGANIZED HEALTH CARE EDUCATION/TRAINING PROGRAM

## 2022-07-20 PROCEDURE — 700102 HCHG RX REV CODE 250 W/ 637 OVERRIDE(OP): Performed by: HOSPITALIST

## 2022-07-20 PROCEDURE — 36415 COLL VENOUS BLD VENIPUNCTURE: CPT

## 2022-07-20 PROCEDURE — 770001 HCHG ROOM/CARE - MED/SURG/GYN PRIV*

## 2022-07-20 PROCEDURE — 85027 COMPLETE CBC AUTOMATED: CPT

## 2022-07-20 PROCEDURE — 97530 THERAPEUTIC ACTIVITIES: CPT

## 2022-07-20 PROCEDURE — 700111 HCHG RX REV CODE 636 W/ 250 OVERRIDE (IP): Performed by: STUDENT IN AN ORGANIZED HEALTH CARE EDUCATION/TRAINING PROGRAM

## 2022-07-20 PROCEDURE — 700111 HCHG RX REV CODE 636 W/ 250 OVERRIDE (IP): Performed by: INTERNAL MEDICINE

## 2022-07-20 PROCEDURE — 97164 PT RE-EVAL EST PLAN CARE: CPT

## 2022-07-20 PROCEDURE — 02HV33Z INSERTION OF INFUSION DEVICE INTO SUPERIOR VENA CAVA, PERCUTANEOUS APPROACH: ICD-10-PCS | Performed by: STUDENT IN AN ORGANIZED HEALTH CARE EDUCATION/TRAINING PROGRAM

## 2022-07-20 PROCEDURE — A9270 NON-COVERED ITEM OR SERVICE: HCPCS | Performed by: STUDENT IN AN ORGANIZED HEALTH CARE EDUCATION/TRAINING PROGRAM

## 2022-07-20 PROCEDURE — 700101 HCHG RX REV CODE 250

## 2022-07-20 PROCEDURE — C1751 CATH, INF, PER/CENT/MIDLINE: HCPCS

## 2022-07-20 PROCEDURE — 82962 GLUCOSE BLOOD TEST: CPT | Mod: 91

## 2022-07-20 RX ORDER — ENOXAPARIN SODIUM 100 MG/ML
40 INJECTION SUBCUTANEOUS DAILY
Status: DISCONTINUED | OUTPATIENT
Start: 2022-07-20 | End: 2022-07-22

## 2022-07-20 RX ADMIN — OXYCODONE 5 MG: 5 TABLET ORAL at 19:37

## 2022-07-20 RX ADMIN — OXYCODONE 5 MG: 5 TABLET ORAL at 15:45

## 2022-07-20 RX ADMIN — LIDOCAINE 1 PATCH: 700 PATCH TOPICAL at 04:19

## 2022-07-20 RX ADMIN — INSULIN HUMAN 3 UNITS: 100 INJECTION, SOLUTION PARENTERAL at 17:06

## 2022-07-20 RX ADMIN — DRONEDARONE 400 MG: 400 TABLET, FILM COATED ORAL at 17:07

## 2022-07-20 RX ADMIN — CEFTRIAXONE SODIUM 2 G: 10 INJECTION, POWDER, FOR SOLUTION INTRAVENOUS at 15:46

## 2022-07-20 RX ADMIN — INSULIN HUMAN 3 UNITS: 100 INJECTION, SOLUTION PARENTERAL at 12:43

## 2022-07-20 RX ADMIN — LOSARTAN POTASSIUM 25 MG: 50 TABLET, FILM COATED ORAL at 06:11

## 2022-07-20 RX ADMIN — LACTULOSE 15 ML: 20 SOLUTION ORAL at 06:11

## 2022-07-20 RX ADMIN — INSULIN HUMAN 3 UNITS: 100 INJECTION, SOLUTION PARENTERAL at 19:36

## 2022-07-20 RX ADMIN — OXYCODONE 5 MG: 5 TABLET ORAL at 11:38

## 2022-07-20 RX ADMIN — INSULIN HUMAN 6 UNITS: 100 INJECTION, SOLUTION PARENTERAL at 07:51

## 2022-07-20 RX ADMIN — OXYCODONE 5 MG: 5 TABLET ORAL at 04:19

## 2022-07-20 RX ADMIN — ENOXAPARIN SODIUM 40 MG: 40 INJECTION SUBCUTANEOUS at 17:07

## 2022-07-20 RX ADMIN — ATORVASTATIN CALCIUM 20 MG: 20 TABLET, FILM COATED ORAL at 19:37

## 2022-07-20 RX ADMIN — ACETAMINOPHEN 650 MG: 325 TABLET, FILM COATED ORAL at 22:44

## 2022-07-20 RX ADMIN — OXYCODONE 5 MG: 5 TABLET ORAL at 23:45

## 2022-07-20 RX ADMIN — DRONEDARONE 400 MG: 400 TABLET, FILM COATED ORAL at 07:55

## 2022-07-20 ASSESSMENT — COGNITIVE AND FUNCTIONAL STATUS - GENERAL
DRESSING REGULAR LOWER BODY CLOTHING: A LITTLE
DRESSING REGULAR LOWER BODY CLOTHING: A LOT
SUGGESTED CMS G CODE MODIFIER MOBILITY: CK
STANDING UP FROM CHAIR USING ARMS: TOTAL
DRESSING REGULAR UPPER BODY CLOTHING: A LOT
PERSONAL GROOMING: A LITTLE
MOVING FROM LYING ON BACK TO SITTING ON SIDE OF FLAT BED: A LITTLE
DAILY ACTIVITIY SCORE: 19
TURNING FROM BACK TO SIDE WHILE IN FLAT BAD: UNABLE
EATING MEALS: A LITTLE
CLIMB 3 TO 5 STEPS WITH RAILING: TOTAL
MOBILITY SCORE: 15
DAILY ACTIVITIY SCORE: 14
WALKING IN HOSPITAL ROOM: A LITTLE
HELP NEEDED FOR BATHING: A LOT
HELP NEEDED FOR BATHING: A LITTLE
SUGGESTED CMS G CODE MODIFIER DAILY ACTIVITY: CK
DRESSING REGULAR UPPER BODY CLOTHING: A LITTLE
MOBILITY SCORE: 6
MOVING FROM LYING ON BACK TO SITTING ON SIDE OF FLAT BED: UNABLE
TOILETING: A LOT
CLIMB 3 TO 5 STEPS WITH RAILING: TOTAL
STANDING UP FROM CHAIR USING ARMS: A LOT
MOVING TO AND FROM BED TO CHAIR: UNABLE
TURNING FROM BACK TO SIDE WHILE IN FLAT BAD: A LITTLE
TOILETING: A LITTLE
MOVING TO AND FROM BED TO CHAIR: A LITTLE
SUGGESTED CMS G CODE MODIFIER DAILY ACTIVITY: CK
WALKING IN HOSPITAL ROOM: TOTAL
PERSONAL GROOMING: A LITTLE
SUGGESTED CMS G CODE MODIFIER MOBILITY: CN

## 2022-07-20 ASSESSMENT — PAIN DESCRIPTION - PAIN TYPE
TYPE: SURGICAL PAIN

## 2022-07-20 ASSESSMENT — ENCOUNTER SYMPTOMS
SORE THROAT: 0
DOUBLE VISION: 0
PALPITATIONS: 0
NAUSEA: 0
COUGH: 0
LOSS OF CONSCIOUSNESS: 0
ABDOMINAL PAIN: 1
DIZZINESS: 0
DIARRHEA: 0
MYALGIAS: 1
FEVER: 0
SHORTNESS OF BREATH: 0
BLURRED VISION: 0
BACK PAIN: 1
CHILLS: 0
HEADACHES: 0
VOMITING: 0

## 2022-07-20 ASSESSMENT — GAIT ASSESSMENTS: GAIT LEVEL OF ASSIST: UNABLE TO PARTICIPATE

## 2022-07-20 NOTE — PROGRESS NOTES
ID Brief Note     Reviewed chart including vitals and labs.  Noted surgery yesterday with stage I of planned 2-stage procedure.    --- Continue ceftriaxone  --- Will follow OR cultures for the next 1 to 2 days and then can make final plan-anticipate will need a 6-week IV antibiotic course  --- Okay to place PICC line     ID will follow.     Aurea Ramos MD

## 2022-07-20 NOTE — OR SURGEON
Immediate Post OP Note    PreOp Diagnosis: left septic KTA      PostOp Diagnosis: left septic TKA      Procedure(s):  REVISION, TOTAL ARTHROPLASTY, KNEE, ALL COMPONENTS - ANTIBIOTIC SPACER - Wound Class: Contaminated    Surgeon(s):  Wild Luz M.D.    Anesthesiologist/Type of Anesthesia:  Anesthesiologist: Matthieu Curtis M.D./General    Surgical Staff:  Circulator: Monique Connelly R.N.  Limb Ramos: Tosha Oconnor  Relief Circulator: Mirna Callahan R.N.  Scrub Person: Nayeli Hodgson  First Assist: Laura Rojas P.A.-C.    Specimens removed if any:  ID Type Source Tests Collected by Time Destination   1 : Left medial gutter Tissue Knee AFB CULTURE, FUNGAL CULTURE, AEROBIC/ANAEROBIC CULTURE (SURGERY) Wild Luz M.D. 7/19/2022  3:55 PM    2 : Left lateral gutter Tissue Knee AFB CULTURE, FUNGAL CULTURE, AEROBIC/ANAEROBIC CULTURE (SURGERY) Wild Luz M.D. 7/19/2022  3:57 PM    3 : Left anterior tibia Bone Knee AFB CULTURE, FUNGAL CULTURE, AEROBIC/ANAEROBIC CULTURE (SURGERY) Wild Luz M.D. 7/19/2022  3:58 PM    4 : Left femoral canal Bone Knee AFB CULTURE, FUNGAL CULTURE, AEROBIC/ANAEROBIC CULTURE (SURGERY) Wild Luz M.D. 7/19/2022  4:26 PM    5 : Left tibial canal Bone Knee AEROBIC/ANAEROBIC CULTURE (SURGERY) Wild Luz M.D. 7/19/2022  4:27 PM        Estimated Blood Loss: 300 cc    Findings: Obvious purulent drainage with purulence under implants; loose implants    Complications: none    Disposition:   1. TTWB, knee immobilizer  2. Preveena dressing to stay 7 days  3. Ok to remove hemovac dressing POD2  4. Abx per ID  5. Post op xrays  6. DVT ppx per primary  7. Call me with any questions/concerns        7/19/2022 6:00 PM Wild Luz M.D.

## 2022-07-20 NOTE — CARE PLAN
The patient is Stable - Low risk of patient condition declining or worsening    Shift Goals  Clinical Goals: pain mgmt, rest  Patient Goals: pain mgmt, rest  Family Goals: EDMUND    Progress made toward(s) clinical / shift goals:    Problem: Knowledge Deficit - Standard  Goal: Patient and family/care givers will demonstrate understanding of plan of care, disease process/condition, diagnostic tests and medications  Outcome: Progressing     Problem: Pain - Standard  Goal: Alleviation of pain or a reduction in pain to the patient’s comfort goal  Outcome: Progressing     Problem: Skin Integrity  Goal: Skin integrity is maintained or improved  Outcome: Progressing     Problem: Fall Risk  Goal: Patient will remain free from falls  Outcome: Progressing

## 2022-07-20 NOTE — PROGRESS NOTES
Surgery:  Left TKA resection and antibiotic spacer  Date of surgery: 7/19/22    Subjective: POD1 s/p L knee resection and antibiotic spacer. She is having drainage around her drain site; HV is holding suction but no active drainage. Pain well controlled. Dressing was reinforced.    Objective:   General: awake, alert, no distress  MSK:   LLE demonstrates dressing is reinforced over left knee. Preveena dressing is holding suction. She demonstrates EHL, FHL, TA and GSC> Grossly intact sensation in L2-S1 distribution. Toes warm and well perfused.     Cultures: NGTD from knee          Labs:   Lab Results   Component Value Date/Time    WBC 11.5 (H) 07/20/2022 03:23 AM    RBC 3.18 (L) 07/20/2022 03:23 AM    HEMOGLOBIN 8.9 (L) 07/20/2022 03:23 AM    HEMATOCRIT 28.2 (L) 07/20/2022 03:23 AM    MCV 88.7 07/20/2022 03:23 AM    MCH 28.0 07/20/2022 03:23 AM    MCHC 31.6 (L) 07/20/2022 03:23 AM    MPV 9.1 07/20/2022 03:23 AM    NEUTSPOLYS 85.30 (H) 07/19/2022 01:05 AM    LYMPHOCYTES 8.10 (L) 07/19/2022 01:05 AM    MONOCYTES 5.00 07/19/2022 01:05 AM    EOSINOPHILS 0.40 07/19/2022 01:05 AM    BASOPHILS 0.10 07/19/2022 01:05 AM    HYPOCHROMIA 1+ 03/12/2009 06:50 PM    ANISOCYTOSIS 1+ 06/11/2006 05:30 AM        Assessment/Plan:  POD1 s/p L knee resection and antibiotic spacer. Doing well.   1. TTWB LLE in knee immobilizer  2. Preveena dressing to remain in place x7 days then transition to dry dressing  3. Staples to stay until 14 days post op  4. DVT ppx: per primary; likely lovenox 40 mg daily  5. Further procedures pending resolution of infection.   6. Appreciate ID recs  7. Patient will likely have extended course of antibiotic spacer with possible exchange spacer versus reimplantation at 3 months post-op  8. Call me with any questions/concerns in the interim. Patient will need follow up in my clinic at 2 weeks if she is discharged prior to that.

## 2022-07-20 NOTE — THERAPY
"Physical Therapy   Daily Treatment     Patient Name: April Alicia  Age:  62 y.o., Sex:  female  Medical Record #: [id number]  Today's Date: 7/20/2022     Precautions  Precautions: Fall Risk;Toe Touch Weight Bearing Left Lower Extremity  Comments: s/p L TKA explantation and antiobiotic spacer implantation    Assessment    Pt is 1 day s/p L TKA explantation and implantation of antibiotic spacer secondary to septic arthritis. She was initially resistant to performing mobility but eventually became cooperative with persistent encouragement. She was able to adhere to L LE TTWB restrictions during mobility with significant verbal cueing and facilitation, which is described below. Upon returning to supine, significant drainage was noted in her L LE dressings. The nurse was notified. The pt demonstrated good knowledge of her precautions and agreed to attempt to mobilize with staff more frequently. Will continue to follow.     Plan    Continue current treatment plan.    DC Equipment Recommendations: Unable to determine at this time  Discharge Recommendations: Recommend post-acute placement for additional physical therapy services prior to discharge home      Subjective    \"I don't want to get up. I ordered applesauce but I didn't get any.\"      Objective       07/20/22 1418   Precautions   Precautions Fall Risk;Toe Touch Weight Bearing Left Lower Extremity   Comments s/p L TKA explantation and antiobiotic spacer implantation   Vitals   O2 Delivery Device Silicone Nasal Cannula   Pain 0 - 10 Group   Location Knee;Shoulder   Location Orientation Left   Therapist Pain Assessment Post Activity Pain Same as Prior to Activity;Nurse Notified  (Pt reported pain in L s/p TKA revision. Significant drainage noted in L LE dressings. Nurse notified.)   Cognition    Cognition / Consciousness X   Safety Awareness Impaired   New Learning Impaired   Attention Impaired   Comments Pt was initially apprehensive of mobility but " cooperated with persistent encouragement   Passive ROM Lower Body   Passive ROM Lower Body X   Comments L LE in immobilizer s/p L knee sx; RLE WFL   Active ROM Lower Body    Active ROM Lower Body  X   Comments as above   Strength Lower Body   Lower Body Strength  X   Comments as above   Sensation Lower Body   Lower Extremity Sensation   Not Tested   Balance Assessment   Sitting Balance (Static) Fair -   Sitting Balance (Dynamic) Poor +   Standing Balance (Static) Trace +   Weight Shift Sitting Fair   Weight Shift Standing Poor  (with RLE and BUE on FWW; TTWB LLE)   Gait Analysis   Gait Level Of Assist Unable to Participate  (s/p L knee surgery)   Weight Bearing Status WBAT LUE and TTWB L LE   Bed Mobility    Supine to Sit Moderate Assist  (x 2 with support to L LE)   Sit to Supine Moderate Assist  (x 2 with support to LE)   Scooting Standby Assist   Comments required support to L LE during supine>sitting. no support to LE once EOB   Functional Mobility   Sit to Stand Maximal Assist  (x2 with facilitation to ensure L LE TTWB)   Comments Pt only able to tolerate STS with max assist x 2 for 1 minute   How much difficulty does the patient currently have...   Turning over in bed (including adjusting bedclothes, sheets and blankets)? 1   Sitting down on and standing up from a chair with arms (e.g., wheelchair, bedside commode, etc.) 1   Moving from lying on back to sitting on the side of the bed? 1   How much help from another person does the patient currently need...   Moving to and from a bed to a chair (including a wheelchair)? 1   Need to walk in a hospital room? 1   Climbing 3-5 steps with a railing? 1   6 clicks Mobility Score 6   Activity Tolerance   Sitting Edge of Bed 12 minutes   Standing 1min   Comments limited by L LE pain and impaired endurance   Patient / Family Goals    Patient / Family Goal #1 walk again   Goal #1 Outcome Goal not met   Short Term Goals    Short Term Goal # 1 Patient will move  supine<>sitting EOB without bed features with supervision within 6tx in order to get in/out of bed   Goal Outcome # 1 goal not met   Short Term Goal # 2 Patient will move sitting<>standing with supervision within 6tx in order to initiate transfers  (revised 7/20)   Goal Outcome # 2 Goal not met   Short Term Goal # 3 Patient will self propel WC 50ft with supervision within 6tx in order to access environment   Goal Outcome # 3   (added 7/20)   Short Term Goal # 4 Patient will ascend/descend FOS with supervision using depression lift/buttock bump up method within 6tx in order to access apartment   Goal Outcome # 4   (revised 7/20)   Education Group   Education Provided Weight Bearing Precautions;Weight Bearing Status;Role of Physical Therapist   Role of Physical Therapist Patient Response Patient;Acceptance;Explanation;Verbal Demonstration   Weight Bearing Status Patient Response Patient;Acceptance;Explanation;Verbal Demonstration   Weight Bearing Precautions Patient Response Patient;Acceptance;Explanation;Demonstration;Verbal Demonstration;Action Demonstration   Anticipated Discharge Equipment and Recommendations   DC Equipment Recommendations Unable to determine at this time   Discharge Recommendations Recommend post-acute placement for additional physical therapy services prior to discharge home   Interdisciplinary Plan of Care Collaboration   IDT Collaboration with  Nursing;Occupational Therapist   Patient Position at End of Therapy In Bed;Bed Alarm On;Call Light within Reach;Tray Table within Reach;Phone within Reach   Collaboration Comments With OT. RN aware of visit, response.   Session Information   Date / Session Number  7/20 - 3 (2/3, 7/24)

## 2022-07-20 NOTE — PROGRESS NOTES
Patient came back from PACU following surgery. I gave her some dinner and now she is resting comfortably.

## 2022-07-20 NOTE — OR NURSING
1815: Pt arrives to PACU asleep and calm. VSS. Left leg is ace bandaged with knee immobilizer in place. Dressing CDI.     1930: Pt resting comfortably and states pain is tolerable. Denies nausea. Dressing CDI. Little to no ouput from prevena and hemovac. Report called to Jordin MONTAÑO.

## 2022-07-20 NOTE — PROGRESS NOTES
Hospital Medicine Daily Progress Note    Date of Service  7/20/2022    Chief Complaint  April Alicia is a 62 y.o. female admitted 7/10/2022 with hypotension, AMS    Hospital Course  63yo PMHx coccaine and ETOH abuse though sober x 8 years per her report, cirrhosis, asthma, AFib on apixaban, DM, HTN. Took an unknown narcotic.  In ED responded to narcan, labs showing Na 121, Cl 89, Creat 5.35, CXR showing cardiomegally.  Admitted to the floor with AMS, HypoNa, dehydration, sepsis from urinary source.  Subsequent blood cultures + Strep Species    Bacteremia with group B streptococcal bacteremia. ID consulted. -on Rocephin, repeated blood culture NTD. TTE showed  EF of 60%, negative for vegetation.     ELIZABETH-resolved    Hx of L knee arthroplasty, left knee pain and swelling. Xray notes New lucency adjacent to the tibial component of arthroplasty suspicious for loosening or infection. Orthopedics consulted, s/p L knee arthrocentesis 7/16 c/w infection. s/p L knee revision, total arthroplasty with antibiotic spacer and wound vac 7/19.     MRI left shoulder showed possible septic arthritis/bursitis. s/p Left shoulder arthrotomy and lavage for infection with partial   Synovectomy. Left shoulder incision and drainage of the subdeltoid abscess 7/17.    Interval Problem Update  No acute overnight events.   S/p surgery yesterday.   Reports pain improving. On wound vac L knee and SONY drain L shoulder  Follow up OR culture  Cont ceftriaxone   PICC line ordered     I have discussed this patient's plan of care and discharge plan at IDT rounds today with Case Management, Nursing, Nursing leadership, and other members of the IDT team.    Consultants/Specialty  ID  ortho    Code Status  Full Code    Disposition  Patient is not medically cleared for discharge.   Anticipate discharge to to skilled nursing facility.  I have placed the appropriate orders for post-discharge needs.    Review of Systems  Review of Systems    Constitutional: Positive for malaise/fatigue. Negative for chills and fever.   HENT: Negative for nosebleeds and sore throat.    Eyes: Negative for blurred vision and double vision.   Respiratory: Negative for cough and shortness of breath.    Cardiovascular: Negative for chest pain, palpitations and leg swelling.   Gastrointestinal: Positive for abdominal pain. Negative for diarrhea, nausea and vomiting.   Genitourinary: Negative for dysuria.   Musculoskeletal: Positive for back pain, joint pain and myalgias.        L arm pain; left knee pain and swelling    Skin: Negative for rash.   Neurological: Negative for dizziness, loss of consciousness and headaches.        Physical Exam  Temp:  [36.2 °C (97.2 °F)-37.3 °C (99.1 °F)] 36.5 °C (97.7 °F)  Pulse:  [] 72  Resp:  [11-18] 16  BP: ()/(54-77) 96/54  SpO2:  [95 %-100 %] 99 %    Physical Exam  Vitals reviewed.   Constitutional:       General: She is not in acute distress.     Appearance: She is well-developed. She is ill-appearing. She is not diaphoretic.   HENT:      Head: Normocephalic and atraumatic.      Mouth/Throat:      Mouth: Mucous membranes are moist.   Eyes:      Extraocular Movements: Extraocular movements intact.      Conjunctiva/sclera: Conjunctivae normal.   Neck:      Vascular: No JVD.   Cardiovascular:      Rate and Rhythm: Normal rate and regular rhythm.      Pulses: Normal pulses.   Pulmonary:      Effort: Pulmonary effort is normal. No respiratory distress.      Breath sounds: No stridor. No wheezing or rales.   Abdominal:      Palpations: Abdomen is soft.      Tenderness: There is no abdominal tenderness. There is no guarding or rebound.   Musculoskeletal:         General: Swelling and tenderness present.      Cervical back: Neck supple. No rigidity or tenderness.      Right lower leg: No edema.      Left lower leg: No edema.      Comments: Left knee swelling, tenderness  Left shoulder clean dressing, SONY drain in place  L shoulder:  hemovac noted in place, dressing clean and dry   Skin:     General: Skin is warm and dry.      Comments: BL LE skin graft with pigmentation.  No erythema or swelling.    Neurological:      Mental Status: She is alert and oriented to person, place, and time. Mental status is at baseline.   Psychiatric:      Comments: Anxious         Fluids    Intake/Output Summary (Last 24 hours) at 7/20/2022 1017  Last data filed at 7/20/2022 0600  Gross per 24 hour   Intake 1200 ml   Output 890 ml   Net 310 ml       Laboratory  Recent Labs     07/18/22  0325 07/19/22  0105 07/20/22  0323   WBC 10.8 14.2* 11.5*   RBC 3.19* 3.19* 3.18*   HEMOGLOBIN 9.0* 8.9* 8.9*   HEMATOCRIT 27.7* 27.2* 28.2*   MCV 86.8 85.3 88.7   MCH 28.2 27.9 28.0   MCHC 32.5* 32.7* 31.6*   RDW 46.5 44.5 47.5   PLATELETCT 108* 166 168   MPV 10.1 9.7 9.1     Recent Labs     07/18/22  0325 07/20/22  0323   SODIUM 133* 132*   POTASSIUM 4.7 5.5   CHLORIDE 107 108   CO2 18* 17*   GLUCOSE 294* 297*   BUN 30* 44*   CREATININE 0.92 1.06   CALCIUM 7.2* 7.3*     Recent Labs     07/18/22  1020   INR 1.49*               Imaging  DX-KNEE 2- LEFT   Final Result      Status post removal of the tibial component of the left knee arthroplasty      MR-SHOULDER-W/O LEFT   Final Result      1.  Large joint effusion with a large amount of fluid seen extending into the subacromial/subdeltoid bursa with synovitis and small low signal foci within the bursa consistent with pockets of gas. These findings are very suspicious for septic arthritis    and septic bursitis.      2.  Complete full-thickness tear of the supraspinatus tendon with retraction to the bony glenoid.      3.  Full-thickness tear portion of the subscapularis tendon.      4.  Partial-thickness tear of the articular surface fibers of the infraspinatus tendon.      5.  Prominent muscle edema and atrophy involving the rotator cuff as well as all the muscles surrounding the shoulder which may represent myositis.      6.   Osteoarthritis of the glenohumeral joint.      7.  Diffuse tearing of the glenoid labrum.      8.  Subluxation of the long head of the biceps tendon medially from the bicipital groove with marked thinning of the intra-articular portion consistent with tendinosis.      9.  Extensive soft tissue edema to include fat and muscle involving all of the imaged structures around the shoulder likely representing cellulitis.      10.  Widening of the acromioclavicular joint with fragmentation of the distal clavicle consistent with old posttraumatic change versus surgical change.      [id number] VIEW   Final Result      Nonobstructive bowel gas pattern.      EC-ECHOCARDIOGRAM COMPLETE W/O CONT   Final Result      DX-CHEST-PORTABLE (1 VIEW)   Final Result      1.  Interstitial infiltrates versus edema.   2.  Cardiomegaly.      DX-KNEE 2- RIGHT   Final Result      1.  Negative for fracture or malalignment      2.  Right knee arthroplasty appears within normal limits      DX-KNEE 2- LEFT   Final Result      1.  New lucency adjacent to the tibial component of arthroplasty suspicious for loosening or infection      2.  No other finding      US-EXTREMITY VENOUS UPPER UNILAT LEFT   Final Result      US-RENAL   Final Result      1.  Unremarkable kidneys.   2.  Limited evaluation of the bladder.      CT-ABDOMEN-PELVIS W/O   Final Result         Limited exam due to lack of IV contrast.         1. Mild diffuse wall thickening of the urinary bladder could relate to infection or inflammation. Correlate with UA. No hydronephrosis.   2. Nonspecific mildly prominent fluid-filled proximal small bowel in the left abdomen. This could be seen with enteritis, focal ileus or early obstruction.   3. Cirrhotic liver with portal hypertension and splenomegaly. Wall thickening of the ascending colon could relate to portal hypertension.   4. No ascites.      DX-SHOULDER 2+ LEFT   Final Result      1.  No evidence of acute fracture or dislocation.       2.  Old posttraumatic changes of the distal clavicle.      DX-CHEST-PORTABLE (1 VIEW)   Final Result      Cardiomegaly.      IR-PICC LINE PLACEMENT W/ GUIDANCE > AGE 5    (Results Pending)        Assessment/Plan  * Bacteremia  Assessment & Plan  2 bottles with group B strep   Patient reported history of severe bacteremia a year ago after knee surgery last year, which required skin grafting of both lower extremity and was hospitalized at [place] when she visited her son there  Repeat blood cultures NTD    Cont Rocephin  Check TTE negative for vegetation  ID following      Septic arthritis of knee, left ([facility])  Assessment & Plan  History of left knee replacement 2017, which was complicated with infection 2 weeks later  Patient had recurrent left knee infection last year which was complicated with bacteremia and she was hospitalized at the [place] hospital when she visited her son     Xray notes New lucency adjacent to the tibial component of arthroplasty suspicious for loosening or infection.   Orthopedics consulted, s/p L knee arthrocentesis 7/16 c/w infection.   s/p L knee resection and antibiotic spacer 7/19  Cont Ceftriaxone  ID and Ortho following    Septic arthritis of shoulder, left (HCC)  Assessment & Plan  MRI left shoulder showed septic arthritis/subdeltoid abscess    Ortho consulted,  s/p Left shoulder arthrotomy and lavage for infection with partial synovectomy. Left shoulder incision and drainage of the subdeltoid abscess 7/17.  SONY drain in place  fluid culture NTD  On Rocephin, ID following    Iron deficiency anemia  Assessment & Plan  Iron study consistent with iron deficiency  Hgb 10.7 at admission, stable    holding IV iron replacement given ongoing infection    Hyponatremia  Assessment & Plan  123 on admission  High SG on UA: probable hypovolemic and alcohol use  Cont to monitor    Resolved    Acute cystitis without hematuria  Assessment & Plan  On Rocephin for bacteremia    Metabolic  encephalopathy  Assessment & Plan  Likely multifactorial in conjunction with narcotic use  S/p Narcan treatment, improved  Start lacutlose  Cont to treat infection     resolved      Hyperlipidemia- (present on admission)  Assessment & Plan  Continue Lipitor    ELIZABETH (acute kidney injury) (HCC)- (present on admission)  Assessment & Plan  Likely secondary dehydration  4.7 on admission   Good UOP  DC IVFs  Cont to monitor  Renally dose medications as appropriate    Resolved    Atrial fibrillation (HCC)- (present on admission)  Assessment & Plan  On apixaban and dronedarone as outpt  On hold eliquis given upcoming procedure, switch to DVT prophylaxis due to she had surgery yesterday  Rate controlled     Cirrhosis (HCC)- (present on admission)  Assessment & Plan  Secondary to ETOH  Lactulose  monitor    Hypertension- (present on admission)  Assessment & Plan  Resume the home losartan    Controlled type 2 diabetes mellitus with hyperglycemia, without long-term current use of insulin (HCC)- (present on admission)  Assessment & Plan  Sliding-scale insulin, diabetic diet  A1c 5.7%         VTE prophylaxis: SCDs/TEDs and enoxaparin ppx    I have performed a physical exam and reviewed and updated ROS and Plan today (7/20/2022). In review of yesterday's note (7/19/2022), there are no changes except as documented above.

## 2022-07-20 NOTE — THERAPY
"Occupational Therapy  Daily Treatment     Patient Name: April Alicia  Age:  62 y.o., Sex:  female  Medical Record #: [id number]  Today's Date: 7/20/2022     Precautions: Fall Risk, Toe Touch Weight Bearing Left Lower Extremity, Weight Bearing As Tolerated Left Upper Extremity, Immobilizer Left Lower Extremity  Comments: s/p shoulder I&D (7/16) and s/p L knee revision, spacer and clean out (7/19)    Assessment    Pt seen for OT tx after undergoing L shoulder I&D with drain on 7/17 and s/p L knee revision, spacer, I&D on 7/19. Pt able to tolerate WB via LUE today and demonstrated 1x STS at EOB with max 2pa. She required max A for LB dressing, total A for toileting, and mod A for meal set up. Provided continued education on benefits of OOB activity and UE ROM. Pt limited by poor safety, limited attention, poor insight, poor balance, and weakness. Complete co-treat with PT 2/2 said deficits and high complexity requiring two skilled therapists. OT facilitated self cares and UE exercises while PT simultaneously treat pt according to POC.  Will continue to follow.    Plan    Continue current treatment plan.    DC Equipment Recommendations: (P) Unable to determine at this time  Discharge Recommendations: (P) Recommend post-acute placement for additional occupational therapy services prior to discharge home       07/20/22 1345   Total Time Spent   OT Total Time Spent (Calculated) 25   Cognition    Safety Awareness Impaired   New Learning Impaired   Attention Impaired   Comments Pt fixated on \"my meal didn't come on time\" and required max cues/encouragement to participate. Pt eventually agreeable. Continues to be tangential and highly distractable   Active ROM Upper Body   Active ROM Upper Body  X   Comments improved LUE ROM to 90 degrees flexion   Strength Upper Body   Upper Body Strength  WDL   Comments LUE improved strength 3+/5   Balance   Sitting Balance (Static) Fair   Sitting Balance (Dynamic) Fair - "   Standing Balance (Static) Poor +   Weight Shift Sitting Poor   Weight Shift Standing Absent   Skilled Intervention Verbal Cuing;Tactile Cuing;Sequencing;Postural Facilitation   Comments with 2pa HHA   Bed Mobility    Supine to Sit Minimal Assist   Sit to Supine Moderate Assist   Skilled Intervention Verbal Cuing;Tactile Cuing;Sequencing   Activities of Daily Living   Lower Body Dressing Maximal Assist  (socks and brace)   Toileting Maximal Assist  (cleaning urination)   Skilled Intervention Tactile Cuing;Verbal Cuing   How much help from another person does the patient currently need...   6 Clicks Daily Activity Score 14   Functional Mobility   Sit to Stand Maximal Assist  (2pa)   Patient / Family Goals   Patient / Family Goal #1 to walk again   Short Term Goals   Short Term Goal # 1 Pt will tolerate seated ADLs for 10 min unsupported EOB   Goal Outcome # 1 Progressing as expected   Short Term Goal # 2 Pt will demo functional txfs min A   Goal Outcome # 2 Progressing slower than expected   Short Term Goal # 3 Pt will participate in HEP to increased BUE strength   Goal Outcome # 3 Progressing as expected   Education Group   Role of Occupational Therapist Patient Response Patient;Acceptance;Reinforcement Needed   Anticipated Discharge Equipment and Recommendations   DC Equipment Recommendations Unable to determine at this time   Discharge Recommendations Recommend post-acute placement for additional occupational therapy services prior to discharge home

## 2022-07-20 NOTE — PROCEDURES
"Vascular Access Team     Date of Insertion: 7/20/22  Arm Circumference: 34.5  Internal length: 38  External Length: 0  Vein Occupancy %: 29   Reason for PICC: Antibiotic Therapy  Labs: WBC 11.5, , BUN 44, Cr 1.06, GFR 59, INR 1.49     Consents confirmed, vessel patency confirmed with ultrasound. Risks and benefits of procedure explained to patient and education regarding central line associated bloodstream infections provided. Questions answered.      PICC placed in RUE per licensed provider order with ultrasound guidance.  4 Fr, single lumen PICC placed in basilic vein after 1 attempt(s). 2 mL of 1% lidocaine injected intradermally at the insertion site. A 21 gauge microintroducer needle was visualized entering the vein and modified Seldinger technique was used to obtain access to the vein. 38 cm catheter inserted and brisk blood return was observed from each lumen upon aspiration. Line secured at the 0 cm marker. TCS stylet removed and observed to be fully intact. Each lumen flushed using pulsatile method without resistance with 10 mL 0.9% normal saline. PICC line secured with Biopatch and Tegaderm.     Per chest x-ray resullts, \"A peripherally inserted catheter has been placed.  The tip projects appropriately over the superior vena cava.\"  PICC line is appropriate for use at this time. Patient tolerated procedure well, without complications.  Patient condition relayed to primary RN or ordering physician via this post procedure note in the EMR.      Ultrasound images uploaded to PACS and viewable in the EMR - yes  Ultrasound imaged printed and placed in paper chart - no     EGEN Power PICC ref # [id number], Lot # [id number], Expiration Date 9/30/23  "

## 2022-07-20 NOTE — DISCHARGE PLANNING
PERLITA Nayak introduced community care management to the patient.     Pt reports that she is currently living with her daughter but would like to apply for the [facility] wait list. Pt reports that she receives roughly $741 from SS/disability per month.     Pt receives transportation from her daughter and the [facility] when she has a scheduled appt.     Pt receives SNAP benefits.     Pt has a scheduled primary care appointment on 8/19/2022 at 2pm.     Plan: CHW will monitor the chart to see if the patient discharges to home. If so, CHW will call the patient post DC.

## 2022-07-20 NOTE — OP REPORT
TKA Explant    PCP:  Karen Mcclure P.A.-C.    Name:  April Alicia    MRN: [id number]    DATE OF SURGERY: 07/19/22    SURGEON: Wild Luz MD    PREOPERATIVE DIAGNOSIS:  Left knee periprosthetic infection    POSTOPERATIVE DIAGNOSIS:  left knee periprosthetic infection    PROCEDURE(s) PERFORMED:  1.  left total knee arthroplasty explantation  2. left knee insertion of articulating spacer   3. left knee application of incisional wound vacuum     Assistants:  Circulator: Monique Connelly RRONDA  Limb Ramos: Tosha Oconnor  Relief Circulator: Mirna Callahan R.N.  Scrub Person: Nayeli Hodgson  First Assist: Laura Rojas P.A.-C.  1st assist: Laura Rojas PA-C    Assistants were mandatory to provide adequate exposure for accurate implantation of prosthetic components, while protecting vital neurovascular structures    ANESTHESIOLOGIST:  Anesthesiologist: Matthieu Curtis M.D.    ANESTHESIA: General    ESTIMATED BLOOD LOSS: 400 cc    TOURNIQUET TIME: 110 minutes    ANTIBIOTICS:  2 g ancef given prior to incision    COMPLICATIONS: None    IMPLANTS:   6 left CR femur  8x16mm attune revision polyethylene insert    SPECIMENS: Synovial fluid, anterior synovium, lateral gutter tissue, medial gutter tissue, tibial and femoral canal tissue as well.    AORI CLASSIFICATION:  1.  Femoral: type 2A  2.  Tibial:  type 2A    PATELLAR THICKNESS AFTER EXPLANT:  15    FIXATION:  1.  Femoral:  loose  2.  Tibial:  loose  3.  Patella: well-fixed     INDICATION FOR PROCEDURE: April Alicia is a 62 y.o.  female  who presented with a periprosthetic knee infection. Please refer back to previous notes regarding this. She has history of infection about 1 year ago in [place] and seems to have been treated with I&D and poly exchange. She presented to [facility] over the weekend with urosepsis, left septic shoulder and left septic total knee. Given her complex history I discussed treatment options with her  regarding her left septic total knee. Given her prior failure and lucencies on xrays, I discussed with her the high risk of failure with a I&D and poly exchange. I also discussed the risks of 2 stage including continued pain, recurrent infection, failure of the spacer and those listed below. Risks and benefits of surgery were reviewed which included but were not limited to PE, DVT, infection, hardware failure or loosening, reoperation, the need for transfusion and death. Alternatives to surgery had previously been reviewed with the patient as well.  The patient wished to proceed with the above procedure.    PROCEDURE IN DETAIL: The patient was met in the preoperative holding area. Informed consent was previously obtained in clinic and we reviewed this again. Appropriate site was marked. The patient was taken to the operative theater where the above anesthesia was performed without complication.  Abundant soft roll and a tourniquet were placed high on the thigh of the operative extremity.  The operative lower extremity was then prepped and draped in normal sterile fashion. A formal time-out was performed, identifying correct patient, correct site, and correct procedure.  The patient antibiotics received as above before incision.     At that point, an incision was made through the prior incision down through subcutaneous tissue to fascia. The knee was aspirated and this fluid was sent for culture. The knee was exposed with a medial parapatellar arthrotomy.   Significant purulence was noted in the joint. I then performed reconstitution of the medial and lateral gutters. This tissue was sent for culture. I did an anterior synovectomy and this tissue was sent for culture as well.    I then measured the maximal thickness of the patella of 23 mm.  A free hand cut of the patella was performed to remove the patella. I then removed all cemented associated with this.      I then remove the polyethylene component without  complication. Attention was then turned to the femur. With a combination of a saw and osteotomes I was able to remove the femoral component. The femoral component fixation was as noted above.  Femoral bone loss was noted and classified as noted above.  I freshened my femoral cuts in all planes. I meticulously removed cement as well. I sent tissue culture from the femoral canal.    I then performed a posterior capsulectomy.  My attention was then turned to the tibia. With a combination of a saw and osteotomes I was able to remove the tibia. The  tibial component fixation was as noted above.  Tibial bone loss was noted and classified as stated above.  I freshened my tibial cut. I meticulously removed all cement as well. I did send tissue culture from the tibial canal.    At that point, I began irrigation and soaks. I irrigated the wound and canals with 3L sterile saline. Then, a 3 minute soak of dilute betadine using scrubbing brushes to scrub the bony surfaces. Another 3L sterile saline was irrigated through the wound followed by a 3 minute soak of dilute hydrogen peroxide. Finally an additional 3L sterile saline wash.    I then changed my gloves as well as placed a new split sheet over the surgical field area. I then placed my trial femoral and tibial component for fit.  I did leave these components and a little looser than I typically do so that we can have adequate scarring and will be back for the revision surgery. This will allow more options on both the tibial and femoral side. I removed these components and I soaked the wound and both the tibial and femoral canal and the wound with a Betadine solution.     I then removed all instruments from the field. My whole team regowned and draped as well. I then removed the trial components. I then copiously irrigated the wound with another 3 L of fluid. I placed antibiotic cement dowels in the femur and tibia. I then cemented my femoral and tibial components in place  and allowed the cement to cure in extension.  The arthrotomy was closed with #1 Monocryl, subcutaneous tissue closed with 2-0 Monocryl, and I used staples for the skin.  A sterile dressing was placed at the end of the case consisting of a Preveena.  All counts were correct at the end of the case. I was present for all critical portions of the case. The patient was awoken from anesthesia and seemed to tolerate procedure well.     DISPOSTION: Stable to the PACU.  The patient will be toe touch weight bearing. The patient will need a hinged knee brace.  The patient will need PT/OT while in-house. Appreciate internal medicine and ID cares. Will need long term IV antibiotics. I will continue to follow cultures

## 2022-07-20 NOTE — PROGRESS NOTES
"   Orthopaedic PA Progress Note    Interval changes:Rev. TKA with spacer yesterday Dr. Luz.Drainage discovered this afternoon. Dressing changed. No HV output postop.[place] peritubular drainage. Reinforced.  States sore but pain controllable with meds. Very [place]. S/PLeft shoulder arthrotomy and lavage for infection with partial synovectomy, and 2.  Left shoulder incision and drainage of the subdeltoid abscess.    ROS - Patient denies any new issues. No chest pain, dyspnea, or fever.  Pain well controlled.    BP (!) 96/54   Pulse 72   Temp 36.5 °C (97.7 °F) (Temporal)   Resp 16   Ht 1.715 m (5' 7.5\")   Wt 85.7 kg (188 lb 15 oz)   SpO2 99%     Patient seen and examined  No acute distress  Breathing non labored  RRR  Surgical dressing is clean, dry, and intact. Patient clearly fires forearm flexors, forearm extensors, and moves all five fingers without issue . Sensation is intact to light touch throughout median, ulnar, and radial nerve distributions. Strong and palpable radial pulses with capillary refill less than 2 seconds. No arm or hand discomfort.  Surgical dressing is clean, dry, and intact. Patient clearly fires tibialis anterior, EHL, and gastrocnemius/soleus. Sensation is intact to light touch throughout superficial peroneal, deep peroneal, tibial, saphenous, and sural nerve distributions. Strong and palpable 2+ dorsalis pedis and posterior tibial pulses with capillary refill less than 2 seconds. No lower leg tenderness or discomfort.    HV: Shoulder 40 mL / 24; knee 0    Recent Labs     07/18/22  0325 07/19/22  0105 07/20/22  0323   WBC 10.8 14.2* 11.5*   RBC 3.19* 3.19* 3.18*   HEMOGLOBIN 9.0* 8.9* 8.9*   HEMATOCRIT 27.7* 27.2* 28.2*   MCV 86.8 85.3 88.7   MCH 28.2 27.9 28.0   MCHC 32.5* 32.7* 31.6*   RDW 46.5 44.5 47.5   PLATELETCT 108* 166 168   MPV 10.1 9.7 9.1       Active Hospital Problems    Diagnosis    • Hypertension [I10]      Priority: Medium   • Controlled type 2 diabetes mellitus with " hyperglycemia, without long-term current use of insulin (HCC) [E11.65]      Priority: Medium   • Cirrhosis (HCC) [K74.60]      Priority: Low   • Septic arthritis of knee, left (HCC) [M00.9]    • Septic arthritis of shoulder, left (HCC) [M00.9]    • Iron deficiency anemia [D50.9]    • Acute cystitis without hematuria [N30.00]    • Bacteremia [R78.81]    • Hyponatremia [E87.1]    • ELIZABETH (acute kidney injury) (HCC) [N17.9]    • Hyperlipidemia [E78.5]    • Metabolic encephalopathy [G93.41]    • Atrial fibrillation (HCC) [I48.91]        Assessment/Plan:  POD#1/3  Wt bearing status - WBAT UE, TTWB LLE  PT/OT-initiated  Wound care:dressings left in place  Drains - HVs to spring, left in  Amaro-no  Sutures/Staples out- 10-14 days post operatively  Antibiotics: per ID  DVT Prophylaxis- TEDS/SCDs/Foot pumps.   Lovenox: Start 40 mg daily, Duration-until ambulatory > 150'  Future Procedures - pending resolution of infection + 2 wks minimum  Case Coordination for Discharge Planning - Disposition per Med/ ID

## 2022-07-21 LAB
GLUCOSE BLD STRIP.AUTO-MCNC: 117 MG/DL (ref 65–99)
GLUCOSE BLD STRIP.AUTO-MCNC: 158 MG/DL (ref 65–99)
GLUCOSE BLD STRIP.AUTO-MCNC: 174 MG/DL (ref 65–99)
GLUCOSE BLD STRIP.AUTO-MCNC: 180 MG/DL (ref 65–99)

## 2022-07-21 PROCEDURE — 770001 HCHG ROOM/CARE - MED/SURG/GYN PRIV*

## 2022-07-21 PROCEDURE — 700111 HCHG RX REV CODE 636 W/ 250 OVERRIDE (IP): Performed by: STUDENT IN AN ORGANIZED HEALTH CARE EDUCATION/TRAINING PROGRAM

## 2022-07-21 PROCEDURE — A9270 NON-COVERED ITEM OR SERVICE: HCPCS | Performed by: STUDENT IN AN ORGANIZED HEALTH CARE EDUCATION/TRAINING PROGRAM

## 2022-07-21 PROCEDURE — 700102 HCHG RX REV CODE 250 W/ 637 OVERRIDE(OP): Performed by: STUDENT IN AN ORGANIZED HEALTH CARE EDUCATION/TRAINING PROGRAM

## 2022-07-21 PROCEDURE — 700102 HCHG RX REV CODE 250 W/ 637 OVERRIDE(OP): Performed by: HOSPITALIST

## 2022-07-21 PROCEDURE — 99233 SBSQ HOSP IP/OBS HIGH 50: CPT | Performed by: INTERNAL MEDICINE

## 2022-07-21 PROCEDURE — 99232 SBSQ HOSP IP/OBS MODERATE 35: CPT | Performed by: STUDENT IN AN ORGANIZED HEALTH CARE EDUCATION/TRAINING PROGRAM

## 2022-07-21 PROCEDURE — A9270 NON-COVERED ITEM OR SERVICE: HCPCS | Performed by: HOSPITALIST

## 2022-07-21 PROCEDURE — 700101 HCHG RX REV CODE 250

## 2022-07-21 PROCEDURE — 700111 HCHG RX REV CODE 636 W/ 250 OVERRIDE (IP): Performed by: INTERNAL MEDICINE

## 2022-07-21 PROCEDURE — 82962 GLUCOSE BLOOD TEST: CPT | Mod: 91

## 2022-07-21 RX ORDER — 0.9 % SODIUM CHLORIDE 0.9 %
VIAL (ML) INJECTION PRN
Status: CANCELLED | OUTPATIENT
Start: 2022-07-22

## 2022-07-21 RX ORDER — 0.9 % SODIUM CHLORIDE 0.9 %
3 VIAL (ML) INJECTION PRN
Status: CANCELLED | OUTPATIENT
Start: 2022-07-22

## 2022-07-21 RX ORDER — CALCIUM CARBONATE 500 MG/1
500 TABLET, CHEWABLE ORAL 4 TIMES DAILY PRN
Status: DISCONTINUED | OUTPATIENT
Start: 2022-07-21 | End: 2022-08-04 | Stop reason: HOSPADM

## 2022-07-21 RX ORDER — 0.9 % SODIUM CHLORIDE 0.9 %
10 VIAL (ML) INJECTION PRN
Status: CANCELLED | OUTPATIENT
Start: 2022-07-22

## 2022-07-21 RX ORDER — HEPARIN SODIUM (PORCINE) LOCK FLUSH IV SOLN 100 UNIT/ML 100 UNIT/ML
500 SOLUTION INTRAVENOUS PRN
Status: CANCELLED | OUTPATIENT
Start: 2022-07-22

## 2022-07-21 RX ADMIN — ENOXAPARIN SODIUM 40 MG: 40 INJECTION SUBCUTANEOUS at 17:11

## 2022-07-21 RX ADMIN — OXYCODONE 5 MG: 5 TABLET ORAL at 10:15

## 2022-07-21 RX ADMIN — ATORVASTATIN CALCIUM 20 MG: 20 TABLET, FILM COATED ORAL at 20:33

## 2022-07-21 RX ADMIN — INSULIN HUMAN 3 UNITS: 100 INJECTION, SOLUTION PARENTERAL at 12:28

## 2022-07-21 RX ADMIN — INSULIN HUMAN 3 UNITS: 100 INJECTION, SOLUTION PARENTERAL at 20:36

## 2022-07-21 RX ADMIN — ACETAMINOPHEN 650 MG: 325 TABLET, FILM COATED ORAL at 22:09

## 2022-07-21 RX ADMIN — DRONEDARONE 400 MG: 400 TABLET, FILM COATED ORAL at 17:10

## 2022-07-21 RX ADMIN — INSULIN HUMAN 3 UNITS: 100 INJECTION, SOLUTION PARENTERAL at 17:23

## 2022-07-21 RX ADMIN — LIDOCAINE 1 PATCH: 700 PATCH TOPICAL at 02:30

## 2022-07-21 RX ADMIN — CEFTRIAXONE SODIUM 2 G: 10 INJECTION, POWDER, FOR SOLUTION INTRAVENOUS at 17:10

## 2022-07-21 RX ADMIN — OXYCODONE 5 MG: 5 TABLET ORAL at 15:18

## 2022-07-21 RX ADMIN — OXYCODONE 5 MG: 5 TABLET ORAL at 05:36

## 2022-07-21 RX ADMIN — OXYCODONE 5 MG: 5 TABLET ORAL at 20:32

## 2022-07-21 RX ADMIN — LOSARTAN POTASSIUM 25 MG: 50 TABLET, FILM COATED ORAL at 05:36

## 2022-07-21 RX ADMIN — LACTULOSE 15 ML: 20 SOLUTION ORAL at 05:36

## 2022-07-21 RX ADMIN — DRONEDARONE 400 MG: 400 TABLET, FILM COATED ORAL at 08:21

## 2022-07-21 ASSESSMENT — PAIN DESCRIPTION - PAIN TYPE
TYPE: SURGICAL PAIN

## 2022-07-21 ASSESSMENT — ENCOUNTER SYMPTOMS
DIARRHEA: 0
FEVER: 0
VOMITING: 0
HEADACHES: 0
ABDOMINAL PAIN: 1
DIZZINESS: 0
COUGH: 0
WEAKNESS: 0
CHILLS: 0
NERVOUS/ANXIOUS: 0
BACK PAIN: 1
CONSTIPATION: 0
SHORTNESS OF BREATH: 0
DOUBLE VISION: 0
SPUTUM PRODUCTION: 0
BLURRED VISION: 0
SORE THROAT: 0
NAUSEA: 0
PALPITATIONS: 0
LOSS OF CONSCIOUSNESS: 0
ABDOMINAL PAIN: 0
MYALGIAS: 1

## 2022-07-21 NOTE — PROGRESS NOTES
[facility] Medicine Daily Progress Note    Date of Service  7/21/2022    Chief Complaint  April Alicia is a 62 y.o. female admitted 7/10/2022 with hypotension, AMS    Hospital Course  61yo PMHx coccaine and ETOH abuse though sober x 8 years per her report, cirrhosis, asthma, AFib on apixaban, DM, HTN. Took an unknown narcotic.  In ED responded to narcan, labs showing Na 121, Cl 89, Creat 5.35, CXR showing cardiomegally.  Admitted to the floor with AMS, HypoNa, dehydration, sepsis from urinary source.  Subsequent blood cultures + Strep Species    Bacteremia with group B streptococcal bacteremia. ID consulted. -on Rocephin, repeated blood culture NTD. TTE showed  EF of 60%, negative for vegetation.     ELIZABETH-resolved    Hx of L knee arthroplasty, left knee pain and swelling. Xray notes New lucency adjacent to the tibial component of arthroplasty suspicious for loosening or infection. Orthopedics consulted, s/p L knee arthrocentesis 7/16 c/w infection. s/p L knee revision, total arthroplasty with antibiotic spacer and wound vac 7/19.     MRI left shoulder showed possible septic arthritis/bursitis. s/p Left shoulder arthrotomy and lavage for infection with partial   Synovectomy. Left shoulder incision and drainage of the subdeltoid abscess 7/17.    Interval Problem Update  No acute overnight events.   PICC line placed yesterday.   Follow OR culture  On ceftriaxone    I have discussed this patient's plan of care and discharge plan at IDT rounds today with Case Management, Nursing, Nursing leadership, and other members of the IDT team.    Consultants/Specialty  ID  ortho    Code Status  Full Code    Disposition  Patient is not medically cleared for discharge.   Anticipate discharge to to skilled nursing facility.  I have placed the appropriate orders for post-discharge needs.    Review of Systems  Review of Systems   Constitutional: Positive for malaise/fatigue. Negative for chills and fever.   HENT: Negative for  nosebleeds and sore throat.    Eyes: Negative for blurred vision and double vision.   Respiratory: Negative for cough and shortness of breath.    Cardiovascular: Negative for chest pain, palpitations and leg swelling.   Gastrointestinal: Positive for abdominal pain. Negative for diarrhea, nausea and vomiting.   Genitourinary: Negative for dysuria.   Musculoskeletal: Positive for back pain, joint pain and myalgias.        L arm pain; left knee pain and swelling    Skin: Negative for rash.   Neurological: Negative for dizziness, loss of consciousness and headaches.        Physical Exam  Temp:  [36.5 °C (97.7 °F)-37.2 °C (98.9 °F)] 36.8 °C (98.2 °F)  Pulse:  [64-86] 86  Resp:  [18] 18  BP: ()/(60-72) 103/72  SpO2:  [96 %-100 %] 98 %    Physical Exam  Vitals reviewed.   Constitutional:       General: She is not in acute distress.     Appearance: She is well-developed. She is ill-appearing. She is not diaphoretic.   HENT:      Head: Normocephalic and atraumatic.      Mouth/Throat:      Mouth: Mucous membranes are moist.   Eyes:      Extraocular Movements: Extraocular movements intact.      Conjunctiva/sclera: Conjunctivae normal.   Neck:      Vascular: No JVD.   Cardiovascular:      Rate and Rhythm: Normal rate and regular rhythm.      Pulses: Normal pulses.   Pulmonary:      Effort: Pulmonary effort is normal. No respiratory distress.      Breath sounds: No stridor. No wheezing or rales.   Abdominal:      Palpations: Abdomen is soft.      Tenderness: There is no abdominal tenderness. There is no guarding or rebound.   Musculoskeletal:         General: Swelling and tenderness present.      Cervical back: Neck supple. No rigidity or tenderness.      Right lower leg: No edema.      Left lower leg: No edema.      Comments: Left knee swelling, tenderness, status post surgery, on dressing, clean  Left shoulder clean dressing, SONY drain in place     Skin:     General: Skin is warm and dry.   Neurological:      Mental  Status: She is alert and oriented to person, place, and time. Mental status is at baseline.   Psychiatric:      Comments: Anxious         Fluids    Intake/Output Summary (Last 24 hours) at 7/21/2022 1017  Last data filed at 7/21/2022 0600  Gross per 24 hour   Intake 600 ml   Output 967 ml   Net -367 ml       Laboratory  Recent Labs     07/19/22  0105 07/20/22  0323   WBC 14.2* 11.5*   RBC 3.19* 3.18*   HEMOGLOBIN 8.9* 8.9*   HEMATOCRIT 27.2* 28.2*   MCV 85.3 88.7   MCH 27.9 28.0   MCHC 32.7* 31.6*   RDW 44.5 47.5   PLATELETCT 166 168   MPV 9.7 9.1     Recent Labs     07/20/22  0323   SODIUM 132*   POTASSIUM 5.5   CHLORIDE 108   CO2 17*   GLUCOSE 297*   BUN 44*   CREATININE 1.06   CALCIUM 7.3*     Recent Labs     07/18/22  1020   INR 1.49*               Imaging  IR-PICC LINE PLACEMENT W/ GUIDANCE > AGE 5   Final Result                  Ultrasound-guided PICC placement performed by qualified nursing staff as    above.          DX-CHEST-FOR LINE PLACEMENT Perform procedure in: Patient's Room   Final Result      1.  Peripherally inserted catheter has been placed and the tip projects appropriately over the superior vena cava.   2.  Stable enlargement of the cardiomediastinal silhouette.   3.  Stable patchy bilateral interstitial opacities.      DX-KNEE 2- LEFT   Final Result      Status post removal of the tibial component of the left knee arthroplasty      MR-SHOULDER-W/O LEFT   Final Result      1.  Large joint effusion with a large amount of fluid seen extending into the subacromial/subdeltoid bursa with synovitis and small low signal foci within the bursa consistent with pockets of gas. These findings are very suspicious for septic arthritis    and septic bursitis.      2.  Complete full-thickness tear of the supraspinatus tendon with retraction to the bony glenoid.      3.  Full-thickness tear portion of the subscapularis tendon.      4.  Partial-thickness tear of the articular surface fibers of the infraspinatus  tendon.      5.  Prominent muscle edema and atrophy involving the rotator cuff as well as all the muscles surrounding the shoulder which may represent myositis.      6.  Osteoarthritis of the glenohumeral joint.      7.  Diffuse tearing of the glenoid labrum.      8.  Subluxation of the long head of the biceps tendon medially from the bicipital groove with marked thinning of the intra-articular portion consistent with tendinosis.      9.  Extensive soft tissue edema to include fat and muscle involving all of the imaged structures around the shoulder likely representing cellulitis.      10.  Widening of the acromioclavicular joint with fragmentation of the distal clavicle consistent with old posttraumatic change versus surgical change.      KT-IGRPHAZ-0 VIEW   Final Result      Nonobstructive bowel gas pattern.      EC-ECHOCARDIOGRAM COMPLETE W/O CONT   Final Result      DX-CHEST-PORTABLE (1 VIEW)   Final Result      1.  Interstitial infiltrates versus edema.   2.  Cardiomegaly.      DX-KNEE 2- RIGHT   Final Result      1.  Negative for fracture or malalignment      2.  Right knee arthroplasty appears within normal limits      DX-KNEE 2- LEFT   Final Result      1.  New lucency adjacent to the tibial component of arthroplasty suspicious for loosening or infection      2.  No other finding      US-EXTREMITY VENOUS UPPER UNILAT LEFT   Final Result      US-RENAL   Final Result      1.  Unremarkable kidneys.   2.  Limited evaluation of the bladder.      CT-ABDOMEN-PELVIS W/O   Final Result         Limited exam due to lack of IV contrast.         1. Mild diffuse wall thickening of the urinary bladder could relate to infection or inflammation. Correlate with UA. No hydronephrosis.   2. Nonspecific mildly prominent fluid-filled proximal small bowel in the left abdomen. This could be seen with enteritis, focal ileus or early obstruction.   3. Cirrhotic liver with portal hypertension and splenomegaly. Wall thickening of the  ascending colon could relate to portal hypertension.   4. No ascites.      DX-SHOULDER 2+ LEFT   Final Result      1.  No evidence of acute fracture or dislocation.      2.  Old posttraumatic changes of the distal clavicle.      DX-CHEST-PORTABLE (1 VIEW)   Final Result      Cardiomegaly.           Assessment/Plan  * Bacteremia  Assessment & Plan  2 bottles with group B strep   Patient reported history of severe bacteremia a year ago after knee surgery last year, which required skin grafting of both lower extremity and was hospitalized at [place] when she visited her son there  Repeat blood cultures NTD    Cont Rocephin  Check TTE negative for vegetation  ID following      Septic arthritis of knee, left ([facility])  Assessment & Plan  History of left knee replacement 2017, which was complicated with infection 2 weeks later  Patient had recurrent left knee infection last year which was complicated with bacteremia and she was hospitalized at the [place] hospital when she visited her son     Xray notes New lucency adjacent to the tibial component of arthroplasty suspicious for loosening or infection.   Orthopedics consulted, s/p L knee arthrocentesis 7/16 c/w infection.   s/p L knee resection and antibiotic spacer 7/19  Cont Ceftriaxone  ID and Ortho following    Septic arthritis of shoulder, left (HCC)  Assessment & Plan  MRI left shoulder showed septic arthritis/subdeltoid abscess    Ortho consulted,  s/p Left shoulder arthrotomy and lavage for infection with partial synovectomy. Left shoulder incision and drainage of the subdeltoid abscess 7/17.  SONY drain in place  fluid culture NTD  On Rocephin, ID following    Iron deficiency anemia  Assessment & Plan  Iron study consistent with iron deficiency  Hgb 10.7 at admission, stable    holding IV iron replacement given ongoing infection    Hyponatremia  Assessment & Plan  123 on admission  High SG on UA: probable hypovolemic and alcohol use  Cont to monitor    Resolved    Acute  cystitis without hematuria  Assessment & Plan  On Rocephin for bacteremia    Metabolic encephalopathy  Assessment & Plan  Likely multifactorial in conjunction with narcotic use  S/p Narcan treatment, improved  Start lacutlose  Cont to treat infection     resolved      Hyperlipidemia- (present on admission)  Assessment & Plan  Continue Lipitor    ELIZABETH (acute kidney injury) (HCC)- (present on admission)  Assessment & Plan  Likely secondary dehydration  4.7 on admission   Good UOP  DC IVFs  Cont to monitor  Renally dose medications as appropriate    Resolved    Atrial fibrillation (HCC)- (present on admission)  Assessment & Plan  On apixaban and dronedarone as outpt  On hold eliquis given upcoming procedure, switch to DVT prophylaxis due to she had surgery yesterday  Rate controlled     Cirrhosis (HCC)- (present on admission)  Assessment & Plan  Secondary to ETOH  Lactulose  monitor    Hypertension- (present on admission)  Assessment & Plan  Resume the home losartan    Controlled type 2 diabetes mellitus with hyperglycemia, without long-term current use of insulin (HCC)- (present on admission)  Assessment & Plan  Sliding-scale insulin, diabetic diet  A1c 5.7%         VTE prophylaxis: SCDs/TEDs and enoxaparin ppx    I have performed a physical exam and reviewed and updated ROS and Plan today (7/21/2022). In review of yesterday's note (7/20/2022), there are no changes except as documented above.

## 2022-07-21 NOTE — PROGRESS NOTES
"   Orthopaedic PA Progress Note    Interval changes:Rev. TKA with spacer 7/19 Dr. Luz.HV drainage : Knee: scant; shoulder; 17 so far today.. Knee drain out today.Shoulder drain likely out tomorrow or the next day when drainage scant or nil.    Sleeping comfortably, easily awakens.. Very [place]. S/PLeft shoulder arthrotomy and lavage for infection with partial synovectomy, and 2.  Left shoulder incision and drainage of the subdeltoid abscess Dr. Gray on 7/17    ROS - Patient denies any new issues. No chest pain, dyspnea, or fever.  Pain well controlled.    /72   Pulse 86   Temp 36.8 °C (98.2 °F) (Temporal)   Resp 18   Ht 1.715 m (5' 7.5\")   Wt 85.7 kg (188 lb 15 oz)   SpO2 98%     Patient seen and examined  No acute distress  Breathing non labored  RRR  Surgical dressing is clean, dry, and intact. Patient clearly fires forearm flexors, forearm extensors, and moves all five fingers without issue . Sensation is intact to light touch throughout median, ulnar, and radial nerve distributions. Strong and palpable radial pulses with capillary refill less than 2 seconds. No arm or hand discomfort.  Surgical dressing is clean, dry, and intact. Patient clearly fires tibialis anterior, EHL, and gastrocnemius/soleus. Sensation is intact to light touch throughout superficial peroneal, deep peroneal, tibial, saphenous, and sural nerve distributions. Strong and palpable 2+ dorsalis pedis and posterior tibial pulses with capillary refill less than 2 seconds. No lower leg tenderness or discomfort.    HV: Shoulder 17 mL / 24; knee 0    Recent Labs     07/19/22  0105 07/20/22  0323   WBC 14.2* 11.5*   RBC 3.19* 3.18*   HEMOGLOBIN 8.9* 8.9*   HEMATOCRIT 27.2* 28.2*   MCV 85.3 88.7   MCH 27.9 28.0   MCHC 32.7* 31.6*   RDW 44.5 47.5   PLATELETCT 166 168   MPV 9.7 9.1       Active Hospital Problems    Diagnosis    • Hypertension [I10]      Priority: Medium   • Controlled type 2 diabetes mellitus with hyperglycemia, without " long-term current use of insulin (HCC) [E11.65]      Priority: Medium   • Cirrhosis (HCC) [K74.60]      Priority: Low   • Septic arthritis of knee, left (HCC) [M00.9]    • Septic arthritis of shoulder, left (HCC) [M00.9]    • Iron deficiency anemia [D50.9]    • Acute cystitis without hematuria [N30.00]    • Bacteremia [R78.81]    • Hyponatremia [E87.1]    • ELIZABETH (acute kidney injury) (HCC) [N17.9]    • Hyperlipidemia [E78.5]    • Metabolic encephalopathy [G93.41]    • Atrial fibrillation (HCC) [I48.91]        Assessment/Plan:  POD#2/4  Wt bearing status - WBAT UE, TTWB LLE  PT/OT-initiated  Wound care:dressings left in place  Drains - Knee - removed, shoulder - left in place  Amaro-no  Sutures/Staples out- 10-14 days post operatively  Antibiotics: per ID  DVT Prophylaxis- TEDS/SCDs/Foot pumps.   Lovenox: Start 40 mg daily, Duration-until ambulatory > 150'  Future Procedures - pending resolution of infection + 2 wks minimum  Case Coordination for Discharge Planning - Disposition per Med/ ID  Clear for disposition (home/SNF/IRF) from Orthopaedic team standpoint.

## 2022-07-21 NOTE — CARE PLAN
The patient is Stable - Low risk of patient condition declining or worsening    Shift Goals  Clinical Goals: Surgical recovery, Pain control, infection tx and prevention  Patient Goals: Rest, pain control  Family Goals: Rest    Progress made toward(s) clinical / shift goals: Patient progressing slowly this shift. Post op day 4 I&D L shoulder. Post op day 2 I&D L knee. SONY drain remains in left shoulder site covered with aquacel Ag. Hemovac and Pravena wound vac to L knee in place site covered with ace wrap. A&O x 4. Skin intact, has some healed grafts to bilateral shins. Tolerating regular diet - consider switch to consistent carbs as patient is diabetic. Continuing with room air O2. Not moving well, max assist to get up and does not tolerate more than a few steps. Very limited range of motion to left arm. Glucose ACHS. Discharge plan is home once cleared.       Problem: Knowledge Deficit - Standard  Goal: Patient and family/care givers will demonstrate understanding of plan of care, disease process/condition, diagnostic tests and medications  Outcome: Progressing     Problem: Pain - Standard  Goal: Alleviation of pain or a reduction in pain to the patient’s comfort goal  Outcome: Progressing     Problem: Skin Integrity  Goal: Skin integrity is maintained or improved  Outcome: Progressing     Problem: Fall Risk  Goal: Patient will remain free from falls  Outcome: Progressing

## 2022-07-21 NOTE — CARE PLAN
The patient is Stable - Low risk of patient condition declining or worsening    Shift Goals: Pain management; monitor drain output; mobility  Clinical Goals: Surgical recovery, Pain control, infection tx and prevention  Patient Goals: Rest, pain control  Family Goals: Rest    Progress made toward(s) clinical / shift goals:  see above    Patient is not progressing towards the following goals:

## 2022-07-21 NOTE — DISCHARGE PLANNING
MSW met with Pt at bedside to discuss Pt choice. Pt chose [facility] for a first choice and [facility] for a second choice. Pt signed this form and MSW faxed it to [facility] for processing.    Per [facility] Pt was declined by [facility] and [facility]. MSW attempted contact with Pt's daughter, a message was left for a return call.

## 2022-07-21 NOTE — PROGRESS NOTES
Infectious Disease Progress Note    Author: Aurea Ramos M.D. Date & Time of service: 2022  12:10 PM    Chief Complaint:  Group B streptococcus bacteremia      Interval History:    AF, O2 RA, complaining of pain in left knee.     AF, O2 RA, complaining of some pain in left knee but overall unchanged.     AF, O2 RA, OR yesterday for left shoulder I&D.  Discussed below.      Review of Systems:  Review of Systems   Respiratory: Negative for cough, sputum production and shortness of breath.    Gastrointestinal: Negative for abdominal pain, constipation, diarrhea, nausea and vomiting.   Genitourinary: Negative for dysuria.   Musculoskeletal: Positive for joint pain and myalgias.   Neurological: Negative for weakness.   Psychiatric/Behavioral: The patient is not nervous/anxious.        Hemodynamics:  Temp (24hrs), Av.8 °C (98.2 °F), Min:36.5 °C (97.7 °F), Max:37.2 °C (98.9 °F)  Temperature: 36.8 °C (98.2 °F)  Pulse  Av.2  Min: 44  Max: 126   Blood Pressure: 103/72       Physical Exam:  Physical Exam  Cardiovascular:      Rate and Rhythm: Normal rate and regular rhythm.      Heart sounds: Normal heart sounds.   Pulmonary:      Effort: Pulmonary effort is normal.      Breath sounds: Normal breath sounds.   Abdominal:      General: Abdomen is flat. Bowel sounds are normal.      Palpations: Abdomen is soft.   Musculoskeletal:         General: Tenderness and signs of injury present.      Left lower leg: Edema present.      Comments: Left shoulder with drain in place, mild edema and tenderness   Skin:     General: Skin is warm and dry.   Neurological:      General: No focal deficit present.      Mental Status: She is oriented to person, place, and time.   Psychiatric:         Mood and Affect: Mood normal.         Behavior: Behavior normal.         Meds:    Current Facility-Administered Medications:   •  enoxaparin (LOVENOX) injection  •  losartan  •  cefTRIAXone (ROCEPHIN) IV  •  loperamide  •   oxyCODONE immediate-release  •  lidocaine  •  POC Blood Glucose **AND** insulin regular  •  Notify MD and PharmD if FSBG level is less than or equal to 70 mg/dL or patient is showing signs/symptoms of hypoglycemia (tachycardia, palpitations, diaphoresis, clammy, tremulousness, nausea, confused) **AND** Administer 20 grams of glucose (approximately 8 ounces of fruit juice) every 15 minutes PRN FSBS less than 70 mg/dL **AND** dextrose bolus  •  lactulose  •  dronedarone  •  albuterol  •  atorvastatin  •  acetaminophen    Labs:  Recent Labs     07/19/22  0105 07/20/22 0323   WBC 14.2* 11.5*   RBC 3.19* 3.18*   HEMOGLOBIN 8.9* 8.9*   HEMATOCRIT 27.2* 28.2*   MCV 85.3 88.7   MCH 27.9 28.0   RDW 44.5 47.5   PLATELETCT 166 168   MPV 9.7 9.1   NEUTSPOLYS 85.30*  --    LYMPHOCYTES 8.10*  --    MONOCYTES 5.00  --    EOSINOPHILS 0.40  --    BASOPHILS 0.10  --      Recent Labs     07/20/22 0323   SODIUM 132*   POTASSIUM 5.5   CHLORIDE 108   CO2 17*   GLUCOSE 297*   BUN 44*     Recent Labs     07/20/22  0323   CREATININE 1.06       Imaging:  CT-ABDOMEN-PELVIS W/O    Result Date: 7/10/2022  7/10/2022 4:46 PM HISTORY/REASON FOR EXAM:  Abdominal pain, fever. TECHNIQUE/EXAM DESCRIPTION: CT scan of the abdomen and pelvis without contrast. Noncontrast helical scanning was obtained from the diaphragmatic domes through the pubic symphysis. Low dose optimization technique was utilized for this CT exam including automated exposure control and adjustment of the mA and/or kV according to patient size. COMPARISON: None. FINDINGS: Lower Chest: Minimal bibasilar atelectasis. Liver: Cirrhotic appearing liver. Spleen: Enlarged. Pancreas: Unremarkable. Gallbladder: Distended gallbladder. Biliary: No biliary dilatation.. Adrenal glands: Nonenlarged. Kidneys: No renal calculus. No hydronephrosis.. Bowel: Wall thickening of the descending colon. Nonspecific mildly prominent fluid-filled small bowel in the left abdomen. Moderate amount of stool  throughout the colon. Lymph nodes: No adenopathy. Vasculature: The abdominal aorta is normal in caliber Peritoneum: Unremarkable without ascites. Musculoskeletal: No aggressive osseous lesion. Moderate degenerative change of the lumbar spine. Pelvis: Mild diffuse wall thickening of the urinary bladder.. No pelvic free fluid.     Limited exam due to lack of IV contrast. 1. Mild diffuse wall thickening of the urinary bladder could relate to infection or inflammation. Correlate with UA. No hydronephrosis. 2. Nonspecific mildly prominent fluid-filled proximal small bowel in the left abdomen. This could be seen with enteritis, focal ileus or early obstruction. 3. Cirrhotic liver with portal hypertension and splenomegaly. Wall thickening of the ascending colon could relate to portal hypertension. 4. No ascites.    [id number] VIEW    Result Date: 7/16/2022 7/16/2022 2:08 PM HISTORY/REASON FOR EXAM:  Abdominal Pain. TECHNIQUE/EXAM DESCRIPTION AND NUMBER OF VIEWS:  1 view(s) of the abdomen. COMPARISON: CT 7/10/2022 FINDINGS: No free air under the diaphragm.  No significant dilated small bowel loops or air-fluid levels. There is air seen throughout the colon. Amaro catheter in place. No suspicious calcifications. No acute osseous abnormality. Spondylosis.     Nonobstructive bowel gas pattern.    DX-CHEST-FOR LINE PLACEMENT Perform procedure in: Patient's Room    Result Date: 7/20/2022 7/20/2022 11:10 AM HISTORY/REASON FOR EXAM:  confirmation of PICC line. TECHNIQUE/EXAM DESCRIPTION AND NUMBER OF VIEWS: Single view of the chest. COMPARISON: None FINDINGS: A peripherally inserted catheter has been placed.  The tip projects appropriately over the superior vena cava. Heart size is enlarged. Stable patchy bilateral interstitial opacities. No pleural abnormalities are noted.     1.  Peripherally inserted catheter has been placed and the tip projects appropriately over the superior vena cava. 2.  Stable enlargement of the  cardiomediastinal silhouette. 3.  Stable patchy bilateral interstitial opacities.    DX-CHEST-PORTABLE (1 VIEW)    Result Date: 7/14/2022 7/14/2022 1:12 AM HISTORY/REASON FOR EXAM:  Shortness of Breath TECHNIQUE/EXAM DESCRIPTION AND NUMBER OF VIEWS: Single portable view of the chest. COMPARISON: 7/10/2022 FINDINGS: HEART: Enlarged. There is atherosclerotic calcification in the aortic arch. LUNGS: Low lung volumes. Increased interstitial opacities.. Calcified mediastinal nodes. PLEURA: No effusion or pneumothorax.     1.  Interstitial infiltrates versus edema. 2.  Cardiomegaly.    DX-CHEST-PORTABLE (1 VIEW)    Result Date: 7/10/2022  7/10/2022 4:02 PM HISTORY/REASON FOR EXAM: Hypotension. Sepsis. TECHNIQUE/EXAM DESCRIPTION AND NUMBER OF VIEWS: Single portable view of the chest. COMPARISON: 9/9/2020 FINDINGS: There is no evidence of focal consolidation or evidence of pulmonary edema. There is no pleural effusion. The heart is enlarged. There are calcified mediastinal lymph nodes.     Cardiomegaly.    DX-KNEE 2- LEFT    Result Date: 7/19/2022 7/19/2022 8:13 PM HISTORY/REASON FOR EXAM:  Pain/Deformity Following Trauma Hardware removal TECHNIQUE/EXAM DESCRIPTION AND NUMBER OF VIEWS:  2 views of the LEFT knee. COMPARISON: 7/13/2022 FINDINGS: Tibial component of the left knee arthroplasty has been removed. There are postoperative changes in the soft tissues with staples in the overlying skin. A drain projects over the soft tissues lateral to the femoral condyle     Status post removal of the tibial component of the left knee arthroplasty    DX-KNEE 2- RIGHT    Result Date: 7/13/2022 7/13/2022 1:17 PM HISTORY/REASON FOR EXAM:  Atraumatic Pain/Swelling/Deformity. Unable to bear weight on right leg TECHNIQUE/EXAM DESCRIPTION AND NUMBER OF VIEWS:  2 views of the RIGHT knee. COMPARISON: None FINDINGS: There is a right knee arthroplasty present. The arthroplasty appears within normal limits. There are no fracture.     1.   Negative for fracture or malalignment 2.  Right knee arthroplasty appears within normal limits    DX-KNEE 2- LEFT    Result Date: 7/13/2022 7/13/2022 12:55 PM HISTORY/REASON FOR EXAM:  Atraumatic Pain/Swelling/Deformity. Left leg pain TECHNIQUE/EXAM DESCRIPTION AND NUMBER OF VIEWS:  2 views of the LEFT knee. COMPARISON: Left knee x-ray 9/13/2020 FINDINGS: There is no fracture. Alignment is normal. There is a left knee arthroplasty. There are new areas of lucency adjacent to the tibial component both medially and laterally. This is suspicious for prosthetic loosening or infection.     1.  New lucency adjacent to the tibial component of arthroplasty suspicious for loosening or infection 2.  No other finding    DX-SHOULDER 2+ LEFT    Result Date: 7/10/2022  7/10/2022 4:38 PM HISTORY/REASON FOR EXAM: Left-sided shoulder pain. TECHNIQUE/EXAM DESCRIPTION AND NUMBER OF VIEWS:  3 views of the LEFT shoulder. COMPARISON: None FINDINGS: Bone mineralization is normal. No evidence of acute fracture. There is old posttraumatic change of the distal clavicle with a nonunified fracture fragment. Soft tissues are normal.     1.  No evidence of acute fracture or dislocation. 2.  Old posttraumatic changes of the distal clavicle.    MR-SHOULDER-W/O LEFT    Result Date: 7/17/2022 7/16/2022 6:29 PM HISTORY/REASON FOR EXAM: Left shoulder infection. TECHNIQUE/EXAM DESCRIPTION: MRI of the LEFT shoulder without contrast. Using a [employer] Signa 1.5 Suzanne MRI scanner, T1 sagittal, fast spin-echo T2 fat-suppressed oblique coronal, sagittal, and axial and intermediate fast spin-echo oblique coronal images were obtained. COMPARISON: None. FINDINGS: The study is limited by patient motion artifact. Osseous acromial outlet: The acromion demonstrates a curved undersurface. There is no lateral downsloping. There is no evidence of an inferiorly directed subacromial enthesophyte. The acromioclavicular joint is widened with fragmentation of the distal  clavicle possibly related to prior surgery or trauma. There is a large amount of fluid seen within the subacromial/subdeltoid bursa. Large joint effusion extends into the subacromial/subdeltoid bursa. There are pockets of low signal gas and the subdeltoid bursa. There is synovitis within the bursa. Rotator cuff: There is complete full-thickness tear of the supraspinatus tendon with retraction to the bony glenoid. There is prominent muscle atrophy and edema. There is partial thickness tear of the articular surface fibers of the infraspinatus with prominent muscle atrophy and edema. There is full-thickness tear of some of the fibers of the subscapularis tendon. There is prominent muscle edema and atrophy. Glenoid labrum: There is diffuse tearing of the glenoid labrum include the biceps anchor. Osseous structures/Cartilaginous surfaces: There is cartilage loss involving the glenohumeral joint with osteophytic spurring. There is mild marrow edema involving the humeral head and edema involving the bony glenoid posteriorly. There is some posterior  subluxation of the humeral head with respect to the bony glenoid. Miscellaneous: There is a large joint effusion with synovitis. Fluid is again noted to extend into the subacromial/subdeltoid bursa where there is gas within the bursa. The long head of the biceps tendon is dislocated medially from the bicipital groove and is diffusely thickened. There is extensive edema/induration of all of the soft tissues surrounding the shoulder. This extends into the axilla and proximal humerus.     1.  Large joint effusion with a large amount of fluid seen extending into the subacromial/subdeltoid bursa with synovitis and small low signal foci within the bursa consistent with pockets of gas. These findings are very suspicious for septic arthritis and septic bursitis. 2.  Complete full-thickness tear of the supraspinatus tendon with retraction to the bony glenoid. 3.  Full-thickness tear  portion of the subscapularis tendon. 4.  Partial-thickness tear of the articular surface fibers of the infraspinatus tendon. 5.  Prominent muscle edema and atrophy involving the rotator cuff as well as all the muscles surrounding the shoulder which may represent myositis. 6.  Osteoarthritis of the glenohumeral joint. 7.  Diffuse tearing of the glenoid labrum. 8.  Subluxation of the long head of the biceps tendon medially from the bicipital groove with marked thinning of the intra-articular portion consistent with tendinosis. 9.  Extensive soft tissue edema to include fat and muscle involving all of the imaged structures around the shoulder likely representing cellulitis. 10.  Widening of the acromioclavicular joint with fragmentation of the distal clavicle consistent with old posttraumatic change versus surgical change.    US-RENAL    Result Date: 7/10/2022  7/10/2022 7:06 PM HISTORY/REASON FOR EXAM:  Abnormal Labs TECHNIQUE/EXAM DESCRIPTION: Renal ultrasound. COMPARISON:  CT abdomen and pelvis 7/10/2022 FINDINGS: The right kidney measures 12.4 cm.  The right kidney appears normal in contour and parenchymal echotexture. The corticomedullary differentiation is preserved. The right renal collecting system is not dilated. No hydronephrosis. There are no renal calculi. The left kidney measures 10.8 cm. The left kidney appears normal in contour and parenchymal echotexture. The corticomedullary differentiation is preserved. The left renal collecting system is not dilated. No hydronephrosis. There are no renal calculi. The bladder is decompressed around a Amaro catheter.     1.  Unremarkable kidneys. 2.  Limited evaluation of the bladder.    US-EXTREMITY VENOUS UPPER UNILAT LEFT    Result Date: 7/11/2022   Upper Extremity  Venous Duplex Report  Vascular Laboratory  CONCLUSIONS  1. No evidence of deep venous thrombosis.  2. Trace amount of fluid along the distal biceps, nonspecific, possibly  posttraumatic. No drainable  fluid collections.  FRANCISCA TURNER  Exam Date:     2022 01:05  Room #:     Inpatient  Priority:     Stat  Ht (in):             Wt (lb):  Ordering Physician:        GHADA RAMOS  Referring Physician:       [id number] [place]  Sonographer:               Yana Haney RVT  Study Type:                Complete Unilateral  Technical Quality:         Adequate  Age:    62    Gender:     F  MRN:    [id number]  :    1960      BSA:  Indications:     Localized swelling, mass and lump, unspecified upper limb,                   Edema, unspecified  CPT Codes:       38635  ICD Codes:       R22.30  R60.9  History:         Left upper extremity swelling/edema. No priror exams.  Limitations:  PROCEDURES:  Left upper extremity venous duplex imaging.  The following venous structures were evaluated: internal jugular,  subclavian, axillary, brachial, cephalic, and basilic veins.  Serial compression, color, and spectral Doppler flow evaluations were  performed.  FINDINGS:  Left upper extremity-  No evidence of deep venous thrombosis.  Evidence of subacute to chronic superficial venous thrombosis at the distal  cephalic vein.  All other veins demonstrate complete color filling and compressibility with  normal venous flow dynamics including spontaneous flow and respiratory  phasicity.  Flow was evaluated in the contralateral subclavian vein and normal venous  flow dynamics including spontaneous flow and respiratory phasic variation  were demonstrated.  Incidental Finding:  Non-vascularized anechoic structure seen at the left mid bicep.  Santosh Stephenson MD  (Electronically Signed)  Final Date:      2022                   03:24    EC-ECHOCARDIOGRAM COMPLETE W/O CONT    Result Date: 2022  Transthoracic Echo Report Echocardiography Laboratory CONCLUSIONS Normal left ventricular systolic function. The left ventricular ejection fraction is visually estimated to be 60%. Moderate concentric left ventricular  hypertrophy. Mild mitral regurgitation. Normal right ventricular size and systolic function. FRANCISCA TURNER Exam Date:         2022                    15:30 Exam Location:     Inpatient Priority:          Routine Ordering Physician:        GHADA RAMOS Referring Physician:       [id number] [place] Sonographer:               Marybeth DAS Age:    62     Gender:    F MRN:    [id number] :    1960 BSA:    1.99   Ht (in):    67     Wt (lb):    192 Exam Type:     Complete Indications:     Shortness of breath ICD Codes:       R06.02 CPT Codes:       84717 BP:   73     /   45     HR:   96 Technical Quality:       Fair MEASUREMENTS  (Male / Female) Normal Values 2D ECHO LV Diastolic Diameter PLAX        5.2 cm                4.2 - 5.9 / 3.9 - 5.3 cm LV Systolic Diameter PLAX         3.7 cm                2.1 - 4.0 cm IVS Diastolic Thickness           1.6 cm                LVPW Diastolic Thickness          1.2 cm                LVOT Diameter                     2.2 cm                Estimated LV Ejection Fraction    60 %                  LV Ejection Fraction MOD BP       68.2 %                >= 55  % LV Ejection Fraction MOD 4C       65.7 %                LV Ejection Fraction MOD 2C       72.4 %                IVC Diameter                      1 cm                  DOPPLER AV Peak Velocity                  1.6 m/s               AV Peak Gradient                  10.2 mmHg             AV Mean Gradient                  6 mmHg                LVOT Peak Velocity                0.82 m/s              AV Area Cont Eq vti               1.5 cm2               Mitral E Point Velocity           1.4 m/s               TR Peak Velocity                  275 cm/s              * Indicates values subject to auto-interpretation LV EF:  60    % FINDINGS Left Ventricle Normal left ventricular chamber size. Moderate concentric left ventricular hypertrophy. Normal left ventricular systolic function. The left ventricular  ejection fraction is visually estimated to be 60%. Normal diastolic function. Right Ventricle Normal right ventricular size and systolic function. Right Atrium Normal right atrial size. Normal inferior vena cava size and inspiratory collapse. Left Atrium Mildly dilated left atrium. Mitral Valve Structurally normal mitral valve. No mitral stenosis. Mild mitral regurgitation. Aortic Valve Structurally normal aortic valve without stenosis or regurgitation. The aortic valve appears trileaflet. Tricuspid Valve Structurally normal tricuspid valve. No tricuspid stenosis. Mild tricuspid regurgitation. Estimated right ventricular systolic pressure is 34  mmHg. Pulmonic Valve Structurally normal pulmonic valve without stenosis or regurgitation. Pericardium No pericardial effusion. Aorta Normal aortic root for body surface area. The ascending aorta diameter is 3.1 cm. Grant Jimenez M.D. (Electronically Signed) Final Date:     14 July 2022                 16:47    IR-PICC LINE PLACEMENT W/ GUIDANCE > AGE 5    Result Date: 7/20/2022  HISTORY/REASON FOR EXAM:   PICC placement. TECHNIQUE/EXAM DESCRIPTION AND NUMBER OF VIEWS:   PICC line insertion with ultrasound guidance.  The procedure was performed using maximal sterile barrier technique including sterile gown, mask, cap, and donning of sterile gloves following appropriate hand hygiene and/or sterile scrub. Patient skin site was prepped with 2% Chlorhexidine solution. FINDINGS:  PICC line insertion with Ultrasound Guidance was performed by qualified nursing staff without the assistance of a Radiologist. PICC positioning appropriateness confirmed by 3CG technology; chest xray only needed in the instance 3CG unable to confirm placement.              Ultrasound-guided PICC placement performed by qualified nursing staff as above.     X-ray Shoulder 2+ View (Complete) Left    Result Date: 7/6/2022  CLINICAL DATA: Shoulder Pain, TECHNICAL: 3 views. COMPARISON: April 18, 2021  FINDINGS: The left humeral head is subluxed inferior relative to the typical anatomic relationship with the glenoid fossa.  There is increased tissue or fluid density in the subacromial space. Left glenohumeral joint space remains visible on frontal view. Old fracture deformity of the distal left clavicle.    INFERIOR SUBLUXATION OF THE LEFT HUMERAL HEAD RELATIVE TO THE GLENOID WITH INCREASED SUBACROMIAL DENSITY WHICH COULD BE RELATED TO SOFT TISSUE SWELLING OR EFFUSION. NO ACUTE FRACTURE. OLD FRACTURE DEFORMITY OF THE DISTAL LEFT CLAVICLE.      Micro:  Results     Procedure Component Value Units Date/Time    CULTURE TISSUE W/ GRM STAIN [[id number]] Collected: 07/19/22 1627    Order Status: Completed Specimen: Tissue Updated: 07/21/22 1117     Significant Indicator NEG     Source TISS     Site Left Tibia Canal     Culture Result No growth at 48 hours.     Gram Stain Result Rare WBCs.  No organisms seen.      Narrative:      Surgery Specimen    Anaerobic Culture [[id number]] Collected: 07/19/22 1627    Order Status: Completed Specimen: Tissue Updated: 07/21/22 1117     Significant Indicator NEG     Source TISS     Site Left Tibia Canal     Culture Result Culture in progress.    Narrative:      Surgery Specimen    Fungal Culture [[id number]] Collected: 07/19/22 1627    Order Status: Completed Specimen: Tissue Updated: 07/21/22 1117     Significant Indicator NEG     Source TISS     Site Left Tibia Canal     Culture Result Culture in progress.     Fungal Smear Results No fungal elements seen.    Narrative:      Surgery Specimen    AFB Culture [[id number]] Collected: 07/19/22 1627    Order Status: Completed Specimen: Tissue Updated: 07/21/22 1117     Significant Indicator NEG     Source TISS     Site Left Tibia Canal     Culture Result Culture in progress.     AFB Smear Results No acid fast bacilli seen.    Narrative:      Surgery Specimen    Anaerobic Culture [[id number]] Collected: 07/19/22 1626    Order Status: Completed  Specimen: Tissue Updated: 07/21/22 1116     Significant Indicator NEG     Source TISS     Site Left Femoral Canal     Culture Result Culture in progress.    Narrative:      Surgery Specimen    Fungal Culture [[id number]] Collected: 07/19/22 1626    Order Status: Completed Specimen: Tissue Updated: 07/21/22 1116     Significant Indicator NEG     Source TISS     Site Left Femoral Canal     Culture Result Culture in progress.     Fungal Smear Results No fungal elements seen.    Narrative:      Surgery Specimen    AFB Culture [[id number]] Collected: 07/19/22 1626    Order Status: Completed Specimen: Tissue Updated: 07/21/22 1116     Significant Indicator NEG     Source TISS     Site Left Femoral Canal     Culture Result Culture in progress.     AFB Smear Results No acid fast bacilli seen.    Narrative:      Surgery Specimen    CULTURE TISSUE W/ GRM STAIN [[id number]] Collected: 07/19/22 1626    Order Status: Completed Specimen: Tissue Updated: 07/21/22 1116     Significant Indicator NEG     Source TISS     Site Left Femoral Canal     Culture Result No growth at 48 hours.     Gram Stain Result No organisms seen.    Narrative:      Surgery Specimen    AFB Culture [[id number]] Collected: 07/19/22 1558    Order Status: Completed Specimen: Tissue Updated: 07/21/22 1116     Significant Indicator NEG     Source TISS     Site Left Anterior Tibia     Culture Result Culture in progress.     AFB Smear Results No acid fast bacilli seen.    Narrative:      Surgery Specimen    CULTURE TISSUE W/ GRM STAIN [[id number]] Collected: 07/19/22 1558    Order Status: Completed Specimen: Tissue Updated: 07/21/22 1116     Significant Indicator NEG     Source TISS     Site Left Anterior Tibia     Culture Result No growth at 48 hours.     Gram Stain Result Rare WBCs.  No organisms seen.      Narrative:      Surgery Specimen    Anaerobic Culture [[id number]] Collected: 07/19/22 1558    Order Status: Completed Specimen: Tissue Updated: 07/21/22 1116      Significant Indicator NEG     Source TISS     Site Left Anterior Tibia     Culture Result Culture in progress.    Narrative:      Surgery Specimen    Fungal Culture [[id number]] Collected: 07/19/22 1558    Order Status: Completed Specimen: Tissue Updated: 07/21/22 1116     Significant Indicator NEG     Source TISS     Site Left Anterior Tibia     Culture Result Culture in progress.     Fungal Smear Results No fungal elements seen.    Narrative:      Surgery Specimen    CULTURE TISSUE W/ GRM STAIN [[id number]] Collected: 07/19/22 1557    Order Status: Completed Specimen: Tissue Updated: 07/21/22 1115     Significant Indicator NEG     Source TISS     Site Left Lateral Gutter     Culture Result No growth at 48 hours.     Gram Stain Result Rare WBCs.  No organisms seen.      Narrative:      Surgery Specimen    Anaerobic Culture [[id number]] Collected: 07/19/22 1557    Order Status: Completed Specimen: Tissue Updated: 07/21/22 1115     Significant Indicator NEG     Source TISS     Site Left Lateral Gutter     Culture Result Culture in progress.    Narrative:      Surgery Specimen    Fungal Culture [[id number]] Collected: 07/19/22 1557    Order Status: Completed Specimen: Tissue Updated: 07/21/22 1115     Significant Indicator NEG     Source TISS     Site Left Lateral Gutter     Culture Result Culture in progress.     Fungal Smear Results No fungal elements seen.    Narrative:      Surgery Specimen    AFB Culture [[id number]] Collected: 07/19/22 1557    Order Status: Completed Specimen: Tissue Updated: 07/21/22 1115     Significant Indicator NEG     Source TISS     Site Left Lateral Gutter     Culture Result Culture in progress.     AFB Smear Results No acid fast bacilli seen.    Narrative:      Surgery Specimen    Anaerobic Culture [[id number]] Collected: 07/19/22 1555    Order Status: Completed Specimen: Tissue Updated: 07/21/22 1113     Significant Indicator NEG     Source TISS     Site Left Medial Gutter     Culture Result  Culture in progress.    Narrative:      Surgery Specimen    Fungal Culture [[id number]] Collected: 07/19/22 1555    Order Status: Completed Specimen: Tissue Updated: 07/21/22 1113     Significant Indicator NEG     Source TISS     Site Left Medial Gutter     Culture Result Culture in progress.     Fungal Smear Results No fungal elements seen.    Narrative:      Surgery Specimen    AFB Culture [[id number]] Collected: 07/19/22 1555    Order Status: Completed Specimen: Tissue Updated: 07/21/22 1113     Significant Indicator NEG     Source TISS     Site Left Medial Gutter     Culture Result Culture in progress.     AFB Smear Results No acid fast bacilli seen.    Narrative:      Surgery Specimen    CULTURE TISSUE W/ GRM STAIN [[id number]] Collected: 07/19/22 1555    Order Status: Completed Specimen: Tissue Updated: 07/21/22 1113     Significant Indicator NEG     Source TISS     Site Left Medial Gutter     Culture Result No growth at 48 hours.     Gram Stain Result Rare WBCs.  No organisms seen.      Narrative:      Surgery Specimen    Fungal Smear [[id number]] Collected: 07/19/22 1555    Order Status: Completed Specimen: Tissue Updated: 07/20/22 1808     Significant Indicator NEG     Source TISS     Site Left Medial Gutter     Fungal Smear Results No fungal elements seen.    Narrative:      Surgery Specimen    Fungal Smear [[id number]] Collected: 07/19/22 1557    Order Status: Completed Specimen: Tissue Updated: 07/20/22 1808     Significant Indicator NEG     Source TISS     Site Left Lateral Gutter     Fungal Smear Results No fungal elements seen.    Narrative:      Surgery Specimen    GRAM STAIN [[id number]] Collected: 07/19/22 1555    Order Status: Completed Specimen: Tissue Updated: 07/20/22 1808     Significant Indicator .     Source TISS     Site Left Medial Gutter     Gram Stain Result Rare WBCs.  No organisms seen.      Narrative:      Surgery Specimen    GRAM STAIN [[id number]] Collected: 07/19/22 1557    Order Status:  Completed Specimen: Tissue Updated: 07/20/22 1808     Significant Indicator .     Source TISS     Site Left Lateral Gutter     Gram Stain Result Rare WBCs.  No organisms seen.      Narrative:      Surgery Specimen    Acid Fast Stain [[id number]] Collected: 07/19/22 1557    Order Status: Completed Specimen: Tissue Updated: 07/20/22 1808     Significant Indicator NEG     Source TISS     Site Left Lateral Gutter     AFB Smear Results No acid fast bacilli seen.    Narrative:      Surgery Specimen    Acid Fast Stain [[id number]] Collected: 07/19/22 1555    Order Status: Completed Specimen: Tissue Updated: 07/20/22 1808     Significant Indicator NEG     Source TISS     Site Left Medial Gutter     AFB Smear Results No acid fast bacilli seen.    Narrative:      Surgery Specimen    Fungal Smear [[id number]] Collected: 07/19/22 1558    Order Status: Completed Specimen: Tissue Updated: 07/20/22 1808     Significant Indicator NEG     Source TISS     Site Left Anterior [place]     Fungal Smear Results No fungal elements seen.    Narrative:      Surgery Specimen    Fungal Smear [[id number]] Collected: 07/19/22 1626    Order Status: Completed Specimen: Tissue Updated: 07/20/22 1808     Significant Indicator NEG     Source TISS     Site Left Femoral Canal     Fungal Smear Results No fungal elements seen.    Narrative:      Surgery Specimen    GRAM STAIN [[id number]] Collected: 07/19/22 1558    Order Status: Completed Specimen: Tissue Updated: 07/20/22 1808     Significant Indicator .     Source TISS     Site Left Anterior [place]     Gram Stain Result Rare WBCs.  No organisms seen.      Narrative:      Surgery Specimen    GRAM STAIN [[id number]] Collected: 07/19/22 1626    Order Status: Completed Specimen: Tissue Updated: 07/20/22 1808     Significant Indicator .     Source TISS     Site Left Femoral Canal     Gram Stain Result No organisms seen.    Narrative:      Surgery Specimen    Acid Fast Stain [[id number]] Collected: 07/19/22 1626     Order Status: Completed Specimen: Tissue Updated: 07/20/22 1808     Significant Indicator NEG     Source TISS     Site Left Femoral Canal     AFB Smear Results No acid fast bacilli seen.    Narrative:      Surgery Specimen    Acid Fast Stain [[id number]] Collected: 07/19/22 1558    Order Status: Completed Specimen: Tissue Updated: 07/20/22 1808     Significant Indicator NEG     Source TISS     Site Left Anterior [place]     AFB Smear Results No acid fast bacilli seen.    Narrative:      Surgery Specimen    Fungal Smear [[id number]] Collected: 07/19/22 1627    Order Status: Completed Specimen: Tissue Updated: 07/20/22 1808     Significant Indicator NEG     Source TISS     Site Left [place] Canal     Fungal Smear Results No fungal elements seen.    Narrative:      Surgery Specimen    GRAM STAIN [[id number]] Collected: 07/19/22 1627    Order Status: Completed Specimen: Tissue Updated: 07/20/22 1808     Significant Indicator .     Source TISS     Site Left [place] Canal     Gram Stain Result Rare WBCs.  No organisms seen.      Narrative:      Surgery Specimen    Acid Fast Stain [[id number]] Collected: 07/19/22 1627    Order Status: Completed Specimen: Tissue Updated: 07/20/22 1808     Significant Indicator NEG     Source TISS     Site Left [place] Canal     AFB Smear Results No acid fast bacilli seen.    Narrative:      Surgery Specimen    CULTURE WOUND W/ GRAM STAIN [[id number]] Collected: 07/17/22 1352    Order Status: Completed Specimen: Wound Updated: 07/20/22 1310     Significant Indicator NEG     Source WND     Site Left shoulder abscess SWAB     Culture Result No growth at 72 hours.     Gram Stain Result Many WBCs.  No organisms seen.      Narrative:      AFB unacceptable on e-swab  07/17/2022  14:53  Surgery - swabs received    Anaerobic Culture [[id number]] Collected: 07/17/22 1352    Order Status: Completed Specimen: Wound Updated: 07/20/22 1310     Significant Indicator NEG     Source WND     Site Left shoulder abscess  SWAB     Culture Result Culture in progress.    Narrative:      AFB unacceptable on e-swab  07/17/2022  14:53  Surgery - swabs received    Fungal Culture [[id number]] Collected: 07/17/22 1352    Order Status: Completed Specimen: Wound Updated: 07/20/22 1310     Significant Indicator NEG     Source WND     Site Left shoulder abscess SWAB     Culture Result No fungal growth to date.     Fungal Smear Results No fungal elements seen.    Narrative:      AFB unacceptable on e-swab  07/17/2022  14:53  Surgery - swabs received    CULTURE WOUND W/ GRAM STAIN [[id number]] Collected: 07/17/22 1352    Order Status: Completed Specimen: Wound Updated: 07/20/22 1309     Significant Indicator NEG     Source WND     Site Left Shoulder Abscess     Culture Result No growth at 72 hours.     Gram Stain Result Many WBCs.  No organisms seen.      Narrative:      Surgery Specimen    Anaerobic Culture [[id number]] Collected: 07/17/22 1352    Order Status: Completed Specimen: Wound Updated: 07/20/22 1309     Significant Indicator NEG     Source WND     Site Left Shoulder Abscess     Culture Result Culture in progress.    Narrative:      Surgery Specimen    Fungal Culture [[id number]] Collected: 07/17/22 1352    Order Status: Completed Specimen: Wound Updated: 07/20/22 1309     Significant Indicator NEG     Source WND     Site Left Shoulder Abscess     Culture Result No fungal growth to date.     Fungal Smear Results No fungal elements seen.    Narrative:      Surgery Specimen    AFB Culture [[id number]] Collected: 07/17/22 1352    Order Status: Completed Specimen: Wound Updated: 07/20/22 1309     Significant Indicator NEG     Source WND     Site Left Shoulder Abscess     Culture Result Culture in progress.     AFB Smear Results No acid fast bacilli seen.    Narrative:      Surgery Specimen    AFB Culture [[id number]] Collected: 07/16/22 1500    Order Status: Completed Specimen: Synovial Updated: 07/20/22 1141     Significant Indicator NEG      Source SYNO     Site Left Knee     Culture Result Culture in progress.     AFB Smear Results No acid fast bacilli seen.    Narrative:      Previous comment was modified by ROLANDO at 17:15 on 07/16/22.  LEFT KNEE  ?ordered per hard req  LEFT KNEE    Fungal Culture [[id number]] Collected: 07/16/22 1500    Order Status: Completed Specimen: Synovial Updated: 07/20/22 1141     Significant Indicator NEG     Source SYNO     Site Left Knee     Culture Result No fungal growth to date.     Fungal Smear Results No fungal elements seen.    Narrative:      Previous comment was modified by ROLANDO at 17:15 on 07/16/22.  LEFT KNEE  ?ordered per hard req  LEFT KNEE    FLUID CULTURE W/GRAM STAIN [[id number]] Collected: 07/16/22 1500    Order Status: Completed Specimen: Synovial Updated: 07/20/22 1141     Significant Indicator NEG     Source SYNO     Site Left Knee     Culture Result No growth at 4 days.     Gram Stain Result Many WBCs.  No organisms seen.      Narrative:      Previous comment was modified by ROLANDO at 17:15 on 07/16/22.  LEFT KNEE  ?ordered per hard req  LEFT KNEE    FLUID CULTURE W/GRAM STAIN [[id number]] Collected: 07/16/22 1500    Order Status: Completed Specimen: Synovial Updated: 07/20/22 1047     Significant Indicator NEG     Source SYNO     Site Right Shoulder     Culture Result No growth at 4 days.     Gram Stain Result Many WBCs.  No organisms seen.      Narrative:      Special Contact Isolation  RIGHT SHOULDER    GRAM STAIN [[id number]] Collected: 07/17/22 1352    Order Status: Completed Specimen: Wound Updated: 07/18/22 1713     Significant Indicator .     Source WND     Site Left Shoulder Abscess     Gram Stain Result Many WBCs.  No organisms seen.      Narrative:      Surgery Specimen    Fungal Smear [[id number]] Collected: 07/17/22 1352    Order Status: Completed Specimen: Wound Updated: 07/18/22 1713     Significant Indicator NEG     Source WND     Site Left Shoulder Abscess     Fungal Smear Results No fungal  "elements seen.    Narrative:      Surgery Specimen    Acid Fast Stain [[id number]] Collected: 07/17/22 1352    Order Status: Completed Specimen: Wound Updated: 07/18/22 1713     Significant Indicator NEG     Source WND     Site Left Shoulder Abscess     AFB Smear Results No acid fast bacilli seen.    Narrative:      Surgery Specimen    BLOOD CULTURE [[id number]] Collected: 07/13/22 1344    Order Status: Completed Specimen: Blood from Peripheral Updated: 07/18/22 1500     Significant Indicator NEG     Source BLD     Site PERIPHERAL     Culture Result No growth after 5 days of incubation.    Narrative:      Per Hospital Policy: Only change Specimen Src: to \"Line\" if  specified by physician order.  Left Hand    BLOOD CULTURE [[id number]] Collected: 07/13/22 1344    Order Status: Completed Specimen: Blood from Peripheral Updated: 07/18/22 1500     Significant Indicator NEG     Source BLD     Site PERIPHERAL     Culture Result No growth after 5 days of incubation.    Narrative:      Per Hospital Policy: Only change Specimen Src: to \"Line\" if  specified by physician order.  Right AC    GRAM STAIN [[id number]] Collected: 07/17/22 1352    Order Status: Completed Specimen: Wound Updated: 07/18/22 0142     Significant Indicator .     Source WND     Site Left shoulder abscess SWAB     Gram Stain Result Many WBCs.  No organisms seen.      Narrative:      AFB unacceptable on e-swab  07/17/2022  14:53  Surgery - swabs received    Fungal Smear [[id number]] Collected: 07/17/22 1352    Order Status: Completed Specimen: Wound Updated: 07/18/22 0142     Significant Indicator NEG     Source WND     Site Left shoulder abscess SWAB     Fungal Smear Results No fungal elements seen.    Narrative:      AFB unacceptable on e-swab  07/17/2022  14:53  Surgery - swabs received    GRAM STAIN [[id number]] Collected: 07/16/22 1500    Order Status: Completed Specimen: Synovial Updated: 07/17/22 1642     Significant Indicator .     Source SYNO     Site " Left Knee     Gram Stain Result Many WBCs.  No organisms seen.      Narrative:      Previous comment was modified by ROLANDO at 17:15 on 07/16/22.  LEFT KNEE  ?ordered per hard req  LEFT KNEE    Fungal Smear [[id number]] Collected: 07/16/22 1500    Order Status: Completed Specimen: Synovial Updated: 07/17/22 1642     Significant Indicator NEG     Source SYNO     Site Left Knee     Fungal Smear Results No fungal elements seen.    Narrative:      Previous comment was modified by ROLANDO at 17:15 on 07/16/22.  LEFT KNEE  ?ordered per hard req  LEFT KNEE    Acid Fast Stain [[id number]] Collected: 07/16/22 1500    Order Status: Completed Specimen: Synovial Updated: 07/17/22 1642     Significant Indicator NEG     Source SYNO     Site Left Knee     AFB Smear Results No acid fast bacilli seen.    Narrative:      Previous comment was modified by ROLANDO at 17:15 on 07/16/22.  LEFT KNEE  ?ordered per hard req  LEFT KNEE    GRAM STAIN [[id number]] Collected: 07/16/22 1500    Order Status: Completed Specimen: Synovial Updated: 07/16/22 1849     Significant Indicator .     Source SYNO     Site Right Shoulder     Gram Stain Result Many WBCs.  No organisms seen.      Narrative:      Special Contact Isolation  RIGHT SHOULDER    FLUID CULTURE W/GRAM STAIN [[id number]]     Order Status: No result Specimen: Synovial Fluid     FLUID CULTURE W/GRAM STAIN [[id number]]     Order Status: No result Specimen: Body Fluid from Synovial Fluid     AFB Culture [[id number]]     Order Status: No result Specimen: Synovial Fluid     Fungal Culture [[id number]]     Order Status: No result Specimen: Synovial Fluid           Assessment:  Active Hospital Problems    Diagnosis    • *Bacteremia [R78.81]    • Septic arthritis of knee, left ([facility]) [M00.9]    • Septic arthritis of shoulder, left ([facility]) [M00.9]    • Iron deficiency anemia [D50.9]    • Acute cystitis without hematuria [N30.00]    • Hyponatremia [E87.1]    • ELIZABETH (acute kidney injury) ([facility]) [N17.9]    •  Hyperlipidemia [E78.5]    • Metabolic encephalopathy [G93.41]    • Atrial fibrillation ([facility]CC) [I48.91]    • Cirrhosis (HCC) [K74.60]    • Hypertension [I10]    • Controlled type 2 diabetes mellitus with hyperglycemia, without long-term current use of insulin (HCC) [E11.65]      Interval 24 hours:      AF, O2 RA  Labs reviewed  Imaging personally reviewed both images and report.   Micro reviewed    Patient with some ongoing pain in left knee.  Continued on antibiotics as below.    Assessment:  Patient is a 62-year-old woman with a history of cocaine and alcohol abuse, cirrhosis, type 2 diabetes mellitus, history of left knee arthroplasty secondary to infection with prior skin grafts, and atrial fibrillation on apixaban admitted on 7/10/2022 secondary to altered mentation and hypotension.  She was found to be in acute kidney injury with a creatinine of 5.35.  Urine drug screen was positive for opiates and oxycodone.  Urinalysis had significant pyuria.  CT scan of the abdomen revealed mild diffuse wall thickening of the urinary bladder related to infection versus inflammation.  Blood cultures are now growing group B streptococcus.          Pertinent diagnoses:   Sepsis, improved  Leukocytosis, ongoing, secondary to procedures  Bacteremia bacteremia   -Blood cultures on 7/10 positive GBS, penicillin sensitive  -Blood cultures on 7/13 are no growth to date  -TTE on 7/14 with no vegetations, trileaflet aortic valve, no significant valvular dysfunction, EF 60%  Urinary tract infection suspected due to pyuria, urine culture negative  Acute kidney injury, resolved   Prosthetic joint infection, left knee septic arthritis   -Evaluated by orthopedics with synovial fluid aspirated on 7/16, ,310 and 72% polys consistent with infection  -OR on 7/19 for left total knee arthroplasty explantation and insertion of an articulating spacer, cultures obtained (stage I of planned 2-stage procedure)  Right shoulder  inflammation  -Synovial fluid aspirated on 7/16, Bloody tap, ,000, WBC 29,802, 90% polys   Left shoulder septic arthritis, subdeltoid abscess  - MRI left shoulder with large joint effusion, synovitis and pockets of gas suspicious for septic arthritis and septic bursitis.  Also with full-thickness tear of the supraspinatus tendon and portion of subscapularis tendon.  Partial tear of infraspinatus tendon.  -OR on 7/17 left shoulder arthrotomy and lavage with partial synovectomy, left shoulder incision and drainage of subdeltoid abscess, per op note: copious amounts of cloudy fluid expressed. Cx sent.   Chronic hyponatremia   Alcoholic cirrhosis   Chronic thrombocytopenia  Remote history of cocaine and alcohol abuse   Diarrhea  -C. difficile negative on 7/14      Plan:   -Continue IV ceftriaxone 2 g daily -will continue to follow operative cultures but so far all are negative.  As she had a streptococcal bacteremia will presume this is the same organism causing infection in both the shoulder and the knee  -Follow repeat blood cultures - NGTD  --Follow-up cultures from left knee aspirate, no growth to date  --Follow-up left shoulder OR cultures, no growth to date  -- She will need a 6-week IV antibiotic course from date of knee surgery,  and then stop antibiotics with potential aspiration of the knee to ensure no infection prior to replacing the hardware.  Continue ceftriaxone 2 grams daily at [facility] end 8//30/22 - Patient wishes to do IV antibiotics through the [facility] -orders were written and placed in [facility] under therapy plan.  -Follow-up in ID clinic at the end of antibiotic course     Dispo: Approval for [facility] antibiotics and first appointment scheduled, surgical clearance  PICC: Placed        Discussed with internal medicine, Dr. Lerma.  ID will follow peripherally.

## 2022-07-21 NOTE — PROGRESS NOTES
Report received, chart review complete. Patient resting in bed, call light in reach, care needs met at this time. A&Ox4. Pain managed with oxy, tylenol and ice pack. Tolerating room air O2. Glucose ACHS. Patient progressing well at this time, educated on call light use and to call for help for any needs.

## 2022-07-21 NOTE — ANESTHESIA POSTPROCEDURE EVALUATION
Patient: April Alicia    Procedure Summary     Date: 07/19/22 Room / Location: [place] [address] / SURGERY [facility]    Anesthesia Start: 1523 Anesthesia Stop: 1817    Procedure: REVISION, TOTAL ARTHROPLASTY, KNEE, ALL COMPONENTS - ANTIBIOTIC SPACER (Left Knee) Diagnosis: ( infection in her left total knee arthroplasty)    Surgeons: Wild Luz M.D. Responsible Provider: Matthieu Curtis M.D.    Anesthesia Type: general, peripheral nerve block ASA Status: 3          Final Anesthesia Type: general, peripheral nerve block  Last vitals  BP   Blood Pressure: 103/72    Temp   36.8 °C (98.2 °F)    Pulse   86   Resp   18    SpO2   98 %      Anesthesia Post Evaluation    Patient location during evaluation: PACU  Patient participation: complete - patient participated  Level of consciousness: awake and alert    Airway patency: patent  Anesthetic complications: no  Cardiovascular status: hemodynamically stable  Respiratory status: acceptable  Hydration status: euvolemic    PONV: none          No complications documented.     Nurse Pain Score: 5 (NPRS)

## 2022-07-21 NOTE — CARE PLAN
The patient is Stable - Low risk of patient condition declining or worsening    Shift Goals  Clinical Goals: pain control, monitor wound  Patient Goals: rest  Family Goals: EDMUND    Patient A/OX4, VSS. Oxycodone administered prn for pain. Patient worked with PT. Dressing to LLE saturated. Ortho PA notified and at bedside. Ortho PA changed dressing. Drains to LLE intact.       Progress made toward(s) clinical / shift goals:    Problem: Knowledge Deficit - Standard  Goal: Patient and family/care givers will demonstrate understanding of plan of care, disease process/condition, diagnostic tests and medications  Outcome: Progressing     Problem: Pain - Standard  Goal: Alleviation of pain or a reduction in pain to the patient’s comfort goal  Outcome: Progressing     Problem: Skin Integrity  Goal: Skin integrity is maintained or improved  Outcome: Progressing     Problem: Fall Risk  Goal: Patient will remain free from falls  Outcome: Progressing       Patient is not progressing towards the following goals:

## 2022-07-21 NOTE — DISCHARGE PLANNING
Received Choice form at 1205  Agency/Facility Name: [facility]  Referral sent per Choice form @ 1210     1510:  Agency/Facility Name: [facility], [place]  Referral sent per SW request @ 1511

## 2022-07-22 LAB
BACTERIA SPEC ANAEROBE CULT: NORMAL
BACTERIA SPEC ANAEROBE CULT: NORMAL
BACTERIA TISS AEROBE CULT: NORMAL
GLUCOSE BLD STRIP.AUTO-MCNC: 111 MG/DL (ref 65–99)
GLUCOSE BLD STRIP.AUTO-MCNC: 126 MG/DL (ref 65–99)
GLUCOSE BLD STRIP.AUTO-MCNC: 240 MG/DL (ref 65–99)
GRAM STN SPEC: NORMAL
SIGNIFICANT IND 70042: NORMAL
SITE SITE: NORMAL
SOURCE SOURCE: NORMAL

## 2022-07-22 PROCEDURE — XW023U6 INTRODUCTION OF COVID-19 VACCINE INTO MUSCLE, PERCUTANEOUS APPROACH, NEW TECHNOLOGY GROUP 6: ICD-10-PCS | Performed by: STUDENT IN AN ORGANIZED HEALTH CARE EDUCATION/TRAINING PROGRAM

## 2022-07-22 PROCEDURE — 700102 HCHG RX REV CODE 250 W/ 637 OVERRIDE(OP): Performed by: STUDENT IN AN ORGANIZED HEALTH CARE EDUCATION/TRAINING PROGRAM

## 2022-07-22 PROCEDURE — A9270 NON-COVERED ITEM OR SERVICE: HCPCS | Performed by: STUDENT IN AN ORGANIZED HEALTH CARE EDUCATION/TRAINING PROGRAM

## 2022-07-22 PROCEDURE — 700101 HCHG RX REV CODE 250

## 2022-07-22 PROCEDURE — 700111 HCHG RX REV CODE 636 W/ 250 OVERRIDE (IP): Performed by: STUDENT IN AN ORGANIZED HEALTH CARE EDUCATION/TRAINING PROGRAM

## 2022-07-22 PROCEDURE — A9270 NON-COVERED ITEM OR SERVICE: HCPCS | Performed by: HOSPITALIST

## 2022-07-22 PROCEDURE — A9270 NON-COVERED ITEM OR SERVICE: HCPCS

## 2022-07-22 PROCEDURE — 82962 GLUCOSE BLOOD TEST: CPT | Mod: 91

## 2022-07-22 PROCEDURE — 0054A HCHG COVID-19 30MCG/0.3ML TRIS-SUC BOOSTER: CPT

## 2022-07-22 PROCEDURE — 97530 THERAPEUTIC ACTIVITIES: CPT

## 2022-07-22 PROCEDURE — 700111 HCHG RX REV CODE 636 W/ 250 OVERRIDE (IP): Performed by: INTERNAL MEDICINE

## 2022-07-22 PROCEDURE — 97110 THERAPEUTIC EXERCISES: CPT

## 2022-07-22 PROCEDURE — 700102 HCHG RX REV CODE 250 W/ 637 OVERRIDE(OP): Performed by: HOSPITALIST

## 2022-07-22 PROCEDURE — 700102 HCHG RX REV CODE 250 W/ 637 OVERRIDE(OP)

## 2022-07-22 PROCEDURE — 770001 HCHG ROOM/CARE - MED/SURG/GYN PRIV*

## 2022-07-22 PROCEDURE — 91305 HCHG RX REV CODE 636 W/ 250 OVERRIDE (IP): CPT | Performed by: STUDENT IN AN ORGANIZED HEALTH CARE EDUCATION/TRAINING PROGRAM

## 2022-07-22 PROCEDURE — 99232 SBSQ HOSP IP/OBS MODERATE 35: CPT | Performed by: STUDENT IN AN ORGANIZED HEALTH CARE EDUCATION/TRAINING PROGRAM

## 2022-07-22 RX ADMIN — OXYCODONE 5 MG: 5 TABLET ORAL at 08:21

## 2022-07-22 RX ADMIN — ACETAMINOPHEN 650 MG: 325 TABLET, FILM COATED ORAL at 04:30

## 2022-07-22 RX ADMIN — LOSARTAN POTASSIUM 25 MG: 50 TABLET, FILM COATED ORAL at 04:30

## 2022-07-22 RX ADMIN — OXYCODONE 5 MG: 5 TABLET ORAL at 00:35

## 2022-07-22 RX ADMIN — DRONEDARONE 400 MG: 400 TABLET, FILM COATED ORAL at 08:21

## 2022-07-22 RX ADMIN — ACETAMINOPHEN 650 MG: 325 TABLET, FILM COATED ORAL at 20:35

## 2022-07-22 RX ADMIN — BNT162B2 0.3 ML: 0.23 INJECTION, SUSPENSION INTRAMUSCULAR at 18:30

## 2022-07-22 RX ADMIN — APIXABAN 5 MG: 5 TABLET, FILM COATED ORAL at 12:19

## 2022-07-22 RX ADMIN — LACTULOSE 15 ML: 20 SOLUTION ORAL at 04:32

## 2022-07-22 RX ADMIN — OXYCODONE 5 MG: 5 TABLET ORAL at 22:15

## 2022-07-22 RX ADMIN — CALCIUM CARBONATE 500 MG: 500 TABLET, CHEWABLE ORAL at 00:36

## 2022-07-22 RX ADMIN — LIDOCAINE 1 PATCH: 700 PATCH TOPICAL at 14:00

## 2022-07-22 RX ADMIN — DRONEDARONE 400 MG: 400 TABLET, FILM COATED ORAL at 17:45

## 2022-07-22 RX ADMIN — INSULIN HUMAN 6 UNITS: 100 INJECTION, SOLUTION PARENTERAL at 17:46

## 2022-07-22 RX ADMIN — ATORVASTATIN CALCIUM 20 MG: 20 TABLET, FILM COATED ORAL at 20:35

## 2022-07-22 RX ADMIN — INSULIN HUMAN 3 UNITS: 100 INJECTION, SOLUTION PARENTERAL at 20:38

## 2022-07-22 RX ADMIN — APIXABAN 5 MG: 5 TABLET, FILM COATED ORAL at 20:35

## 2022-07-22 RX ADMIN — OXYCODONE 5 MG: 5 TABLET ORAL at 14:00

## 2022-07-22 RX ADMIN — CEFTRIAXONE SODIUM 2 G: 10 INJECTION, POWDER, FOR SOLUTION INTRAVENOUS at 17:46

## 2022-07-22 ASSESSMENT — CHA2DS2 SCORE
SEX: FEMALE
CHF OR LEFT VENTRICULAR DYSFUNCTION: NO
PRIOR STROKE OR TIA OR THROMBOEMBOLISM: NO
CHA2DS2 VASC SCORE: 3
AGE 75 OR GREATER: NO
VASCULAR DISEASE: NO
DIABETES: YES
HYPERTENSION: YES
AGE 65 TO 74: NO

## 2022-07-22 ASSESSMENT — ENCOUNTER SYMPTOMS
PALPITATIONS: 0
ABDOMINAL PAIN: 1
BLURRED VISION: 0
SORE THROAT: 0
SHORTNESS OF BREATH: 0
DIZZINESS: 0
COUGH: 0
HEADACHES: 0
NAUSEA: 0
DOUBLE VISION: 0
CHILLS: 0
DIARRHEA: 0
VOMITING: 0
FEVER: 0
BACK PAIN: 1
MYALGIAS: 1
LOSS OF CONSCIOUSNESS: 0

## 2022-07-22 ASSESSMENT — GAIT ASSESSMENTS: GAIT LEVEL OF ASSIST: UNABLE TO PARTICIPATE

## 2022-07-22 ASSESSMENT — PAIN DESCRIPTION - PAIN TYPE
TYPE: SURGICAL PAIN

## 2022-07-22 ASSESSMENT — COGNITIVE AND FUNCTIONAL STATUS - GENERAL
STANDING UP FROM CHAIR USING ARMS: TOTAL
WALKING IN HOSPITAL ROOM: TOTAL
MOVING FROM LYING ON BACK TO SITTING ON SIDE OF FLAT BED: UNABLE
MOVING TO AND FROM BED TO CHAIR: UNABLE
TURNING FROM BACK TO SIDE WHILE IN FLAT BAD: UNABLE
CLIMB 3 TO 5 STEPS WITH RAILING: TOTAL
MOBILITY SCORE: 6
SUGGESTED CMS G CODE MODIFIER MOBILITY: CN

## 2022-07-22 NOTE — PROGRESS NOTES
Hospital Medicine Daily Progress Note    Date of Service  7/22/2022    Chief Complaint  April Alicia is a 62 y.o. female admitted 7/10/2022 with hypotension, AMS    Hospital Course  63yo PMHx coccaine and ETOH abuse though sober x 8 years per her report, cirrhosis, asthma, AFib on apixaban, DM, HTN. Took an unknown narcotic.  In ED responded to narcan, labs showing Na 121, Cl 89, Creat 5.35, CXR showing cardiomegally.  Admitted to the floor with AMS, HypoNa, dehydration, sepsis from urinary source.  Subsequent blood cultures + Strep Species    Bacteremia with group B streptococcal bacteremia. ID consulted. -on Rocephin, repeated blood culture NTD. TTE showed  EF of 60%, negative for vegetation.     ELIZABETH-resolved    Hx of L knee arthroplasty, left knee pain and swelling. Xray notes New lucency adjacent to the tibial component of arthroplasty suspicious for loosening or infection. Orthopedics consulted, s/p L knee arthrocentesis 7/16 c/w infection. s/p L knee revision, total arthroplasty with antibiotic spacer and wound vac 7/19.     MRI left shoulder showed possible septic arthritis/bursitis. s/p Left shoulder arthrotomy and lavage for infection with partial   Synovectomy. Left shoulder incision and drainage of the subdeltoid abscess 7/17.    ID rec cont ceftriaxone 2gm daily end 8/30/2022 and then follow up in ID clinic at the end of antibiotics course. Need potential aspiration of the knee to ensure no infection prior to replacing the hardware.     Interval Problem Update  No acute overnight events.   On SONY drain 25cc out  ID rec cont ceftriaxone 8/30/22  Ortho following  SNF pending    I have discussed this patient's plan of care and discharge plan at IDT rounds today with Case Management, Nursing, Nursing leadership, and other members of the IDT team.    Consultants/Specialty  ID  ortho    Code Status  Full Code    Disposition  Patient is not medically cleared for discharge.   Anticipate discharge to  to skilled nursing facility.  I have placed the appropriate orders for post-discharge needs.    Review of Systems  Review of Systems   Constitutional: Positive for malaise/fatigue. Negative for chills and fever.   HENT: Negative for nosebleeds and sore throat.    Eyes: Negative for blurred vision and double vision.   Respiratory: Negative for cough and shortness of breath.    Cardiovascular: Negative for chest pain, palpitations and leg swelling.   Gastrointestinal: Positive for abdominal pain. Negative for diarrhea, nausea and vomiting.   Genitourinary: Negative for dysuria.   Musculoskeletal: Positive for back pain, joint pain and myalgias.        L arm pain; left knee pain and swelling    Skin: Negative for rash.   Neurological: Negative for dizziness, loss of consciousness and headaches.        Physical Exam  Temp:  [36.2 °C (97.1 °F)-37.1 °C (98.8 °F)] 36.2 °C (97.1 °F)  Pulse:  [60-91] 60  Resp:  [18] 18  BP: (106-122)/(62-74) 116/74  SpO2:  [97 %-99 %] 98 %    Physical Exam  Vitals reviewed.   Constitutional:       General: She is not in acute distress.     Appearance: She is well-developed. She is ill-appearing. She is not diaphoretic.   HENT:      Head: Normocephalic and atraumatic.      Mouth/Throat:      Mouth: Mucous membranes are moist.   Eyes:      Extraocular Movements: Extraocular movements intact.      Conjunctiva/sclera: Conjunctivae normal.   Neck:      Vascular: No JVD.   Cardiovascular:      Rate and Rhythm: Normal rate and regular rhythm.      Pulses: Normal pulses.   Pulmonary:      Effort: Pulmonary effort is normal. No respiratory distress.      Breath sounds: No stridor. No wheezing or rales.   Abdominal:      Palpations: Abdomen is soft.      Tenderness: There is no abdominal tenderness. There is no guarding or rebound.   Musculoskeletal:         General: Swelling and tenderness present.      Cervical back: Neck supple. No rigidity or tenderness.      Right lower leg: No edema.      Left  lower leg: No edema.      Comments: Left knee swelling, tenderness, status post surgery, on dressing, clean  Left shoulder clean dressing, SONY drain in place     Skin:     General: Skin is warm and dry.   Neurological:      Mental Status: She is alert and oriented to person, place, and time. Mental status is at baseline.   Psychiatric:      Comments: Anxious         Fluids    Intake/Output Summary (Last 24 hours) at 7/22/2022 1110  Last data filed at 7/22/2022 0921  Gross per 24 hour   Intake 120 ml   Output 1120 ml   Net -1000 ml       Laboratory  Recent Labs     07/20/22  0323   WBC 11.5*   RBC 3.18*   HEMOGLOBIN 8.9*   HEMATOCRIT 28.2*   MCV 88.7   MCH 28.0   MCHC 31.6*   RDW 47.5   PLATELETCT 168   MPV 9.1     Recent Labs     07/20/22  0323   SODIUM 132*   POTASSIUM 5.5   CHLORIDE 108   CO2 17*   GLUCOSE 297*   BUN 44*   CREATININE 1.06   CALCIUM 7.3*                   Imaging  IR-PICC LINE PLACEMENT W/ GUIDANCE > AGE 5   Final Result                  Ultrasound-guided PICC placement performed by qualified nursing staff as    above.          DX-CHEST-FOR LINE PLACEMENT Perform procedure in: Patient's Room   Final Result      1.  Peripherally inserted catheter has been placed and the tip projects appropriately over the superior vena cava.   2.  Stable enlargement of the cardiomediastinal silhouette.   3.  Stable patchy bilateral interstitial opacities.      DX-KNEE 2- LEFT   Final Result      Status post removal of the tibial component of the left knee arthroplasty      MR-SHOULDER-W/O LEFT   Final Result      1.  Large joint effusion with a large amount of fluid seen extending into the subacromial/subdeltoid bursa with synovitis and small low signal foci within the bursa consistent with pockets of gas. These findings are very suspicious for septic arthritis    and septic bursitis.      2.  Complete full-thickness tear of the supraspinatus tendon with retraction to the bony glenoid.      3.  Full-thickness tear  portion of the subscapularis tendon.      4.  Partial-thickness tear of the articular surface fibers of the infraspinatus tendon.      5.  Prominent muscle edema and atrophy involving the rotator cuff as well as all the muscles surrounding the shoulder which may represent myositis.      6.  Osteoarthritis of the glenohumeral joint.      7.  Diffuse tearing of the glenoid labrum.      8.  Subluxation of the long head of the biceps tendon medially from the bicipital groove with marked thinning of the intra-articular portion consistent with tendinosis.      9.  Extensive soft tissue edema to include fat and muscle involving all of the imaged structures around the shoulder likely representing cellulitis.      10.  Widening of the acromioclavicular joint with fragmentation of the distal clavicle consistent with old posttraumatic change versus surgical change.      [id number] VIEW   Final Result      Nonobstructive bowel gas pattern.      EC-ECHOCARDIOGRAM COMPLETE W/O CONT   Final Result      DX-CHEST-PORTABLE (1 VIEW)   Final Result      1.  Interstitial infiltrates versus edema.   2.  Cardiomegaly.      DX-KNEE 2- RIGHT   Final Result      1.  Negative for fracture or malalignment      2.  Right knee arthroplasty appears within normal limits      DX-KNEE 2- LEFT   Final Result      1.  New lucency adjacent to the tibial component of arthroplasty suspicious for loosening or infection      2.  No other finding      US-EXTREMITY VENOUS UPPER UNILAT LEFT   Final Result      US-RENAL   Final Result      1.  Unremarkable kidneys.   2.  Limited evaluation of the bladder.      CT-ABDOMEN-PELVIS W/O   Final Result         Limited exam due to lack of IV contrast.         1. Mild diffuse wall thickening of the urinary bladder could relate to infection or inflammation. Correlate with UA. No hydronephrosis.   2. Nonspecific mildly prominent fluid-filled proximal small bowel in the left abdomen. This could be seen with enteritis,  focal ileus or early obstruction.   3. Cirrhotic liver with portal hypertension and splenomegaly. Wall thickening of the ascending colon could relate to portal hypertension.   4. No ascites.      DX-SHOULDER 2+ LEFT   Final Result      1.  No evidence of acute fracture or dislocation.      2.  Old posttraumatic changes of the distal clavicle.      DX-CHEST-PORTABLE (1 VIEW)   Final Result      Cardiomegaly.           Assessment/Plan  * Bacteremia  Assessment & Plan  2 bottles with group B strep   Patient reported history of severe bacteremia a year ago after knee surgery last year, which required skin grafting of both lower extremity and was hospitalized at [place] when she visited her son there  Repeat blood cultures NTD    Cont Rocephin  Check TTE negative for vegetation  ID rec cont ceftriaxone 2gm daily end 8/30/2022 and then follow up in ID clinic at the end of antibiotics course. Need potential aspiration of the knee to ensure no infection prior to replacing the hardware.       Septic arthritis of knee, left ([facility])  Assessment & Plan  History of left knee replacement 2017, which was complicated with infection 2 weeks later  Patient had recurrent left knee infection last year which was complicated with bacteremia and she was hospitalized at the [place] hospital when she visited her son     Xray notes New lucency adjacent to the tibial component of arthroplasty suspicious for loosening or infection.   Orthopedics consulted, s/p L knee arthrocentesis 7/16 c/w infection.   s/p L knee resection and antibiotic spacer 7/19  ID rec cont ceftriaxone 2gm daily end 8/30/2022 and then follow up in ID clinic at the end of antibiotics course. Need potential aspiration of the knee to ensure no infection prior to replacing the hardware.     Septic arthritis of shoulder, left (HCC)  Assessment & Plan  MRI left shoulder showed septic arthritis/subdeltoid abscess    Ortho consulted,  s/p Left shoulder arthrotomy and lavage for  infection with partial synovectomy. Left shoulder incision and drainage of the subdeltoid abscess 7/17.  SONY drain in place  fluid culture NTD  On Rocephin, ID following    Iron deficiency anemia  Assessment & Plan  Iron study consistent with iron deficiency  Hgb 10.7 at admission, stable    holding IV iron replacement given ongoing infection    Hyponatremia  Assessment & Plan  123 on admission  High SG on UA: probable hypovolemic and alcohol use  Cont to monitor    Resolved    Acute cystitis without hematuria  Assessment & Plan  On Rocephin for bacteremia    Metabolic encephalopathy  Assessment & Plan  Likely multifactorial in conjunction with narcotic use  S/p Narcan treatment, improved  Start lacutlose  Cont to treat infection     resolved      Hyperlipidemia- (present on admission)  Assessment & Plan  Continue Lipitor    ELIZABETH (acute kidney injury) (HCC)- (present on admission)  Assessment & Plan  Likely secondary dehydration  4.7 on admission   Good UOP  DC IVFs  Cont to monitor  Renally dose medications as appropriate    Resolved    Atrial fibrillation (HCC)- (present on admission)  Assessment & Plan  On apixaban and dronedarone as outpt  On hold eliquis given upcoming procedure, switch to DVT prophylaxis due to she had surgery yesterday  Rate controlled     Cirrhosis (HCC)- (present on admission)  Assessment & Plan  Secondary to ETOH  Lactulose  monitor    Hypertension- (present on admission)  Assessment & Plan  Resume the home losartan    Controlled type 2 diabetes mellitus with hyperglycemia, without long-term current use of insulin (HCC)- (present on admission)  Assessment & Plan  Sliding-scale insulin, diabetic diet  A1c 5.7%         VTE prophylaxis: therapeutic anticoagulation with Eliquis    I have performed a physical exam and reviewed and updated ROS and Plan today (7/22/2022). In review of yesterday's note (7/21/2022), there are no changes except as documented above.

## 2022-07-22 NOTE — THERAPY
Physical Therapy   Daily Treatment     Patient Name: April Alicia  Age:  62 y.o., Sex:  female  Medical Record #: [id number]  Today's Date: 7/22/2022     Precautions  Precautions: Fall Risk;Toe Touch Weight Bearing Left Lower Extremity;Weight Bearing As Tolerated Left Upper Extremity  Comments: hinged knee brace LLE (no orders)    Assessment    The pt presents today agreeable to participate in tx session.  She was instructed on supine and seated therapeutic exercise w/ assistance to complete LLE exercises and good demonstration of each by the pt.  The pt performed bed mobility modA for UE leverage and LE positioning w/ hob elevated and no bed rails.  Pt attempted STSx2 eob w/ FWW and maxA, however unable to maintain LLE TTWB 2/2 inadequate strength LUE to offload limited by pain.  Recommend placement given pt's inability to care for self and high risk of falls at this time.  Will follow.     Plan    Continue current treatment plan.    DC Equipment Recommendations: Unable to determine at this time  Discharge Recommendations: Recommend post-acute placement for additional physical therapy services prior to discharge home      Subjective/Objective       07/22/22 1311   Cognition    Cognition / Consciousness WDL   Level of Consciousness Alert   Comments pt pleasant and cooperative   Passive ROM Lower Body   Passive ROM Lower Body X   Comments LLE knee flexion limited by pain and apprehension, BLE DF limited by joint stiffness (L>R), otherwise WFL BLE   Active ROM Lower Body    Active ROM Lower Body  X   Comments as above   Strength Lower Body   Lower Body Strength  X   Comments LLE generalized weakness 2/2 pain, RLE WFL   Sensation Lower Body   Lower Extremity Sensation   Not Tested   Supine Lower Body Exercise   Supine Lower Body Exercises Yes   Heel Slide 1 set of 10;Bilateral  (min manual resistance RLE, assisted AROM LLE)   Sitting Lower Body Exercises   Sitting Lower Body Exercises Yes   Ankle Pumps 1 set  of 10;Bilateral   Long Arc Quad 1 set of 10;Bilateral  (min manual resistance RLE during extension)   Other Treatments   Other Treatments Provided bed mobility, functional endurance, attempt STS   Balance   Sitting Balance (Static) Fair   Sitting Balance (Dynamic) Fair -   Weight Shift Sitting Fair   Skilled Intervention Verbal Cuing;Tactile Cuing   Comments seated eob only, unable to stand and maintain TTWB 2/2 inability to offload w/ LUE d/t pain   Gait Analysis   Gait Level Of Assist Unable to Participate   Weight Bearing Status WBAT LUE, TTWB LLE   Bed Mobility    Supine to Sit Moderate Assist   Sit to Supine Moderate Assist   Scooting Standby Assist   Skilled Intervention Verbal Cuing;Tactile Cuing;Sequencing;Facilitation;Compensatory Strategies   Comments hob elevated, assist for UE leverage and LE positioning   Functional Mobility   Sit to Stand Unable to Participate   Bed, Chair, Wheelchair Transfer Unable to Participate   Mobility attempt STS eobx2, unable 2/2 inability to offload LLE for TTWB d/t LUE pain   Activity Tolerance   Sitting Edge of Bed 25min   Patient / Family Goals    Patient / Family Goal #1 walk again   Goal #1 Outcome Goal not met   Short Term Goals    Short Term Goal # 1 Patient will move supine<>sitting EOB without bed features with supervision within 6tx in order to get in/out of bed   Goal Outcome # 1 goal not met   Short Term Goal # 2 Patient will move sitting<>standing with supervision within 6tx in order to initiate transfers   Goal Outcome # 2 Goal not met   Short Term Goal # 3 Patient will self propel WC 50ft with supervision within 6tx in order to access environment   Goal Outcome # 3 Goal not met   Short Term Goal # 4 Patient will ascend/descend FOS with supervision using depression lift/buttock bump up method within 6tx in order to access apartment   Goal Outcome # 4 Goal not met   Education Group   Education Provided Weight Bearing Status;Role of Physical Therapist;Exercises -  Supine;Exercises - Seated   Role of Physical Therapist Patient Response Patient;Acceptance;Explanation;Demonstration;Action Demonstration   Exercises - Supine Patient Response Patient;Acceptance;Explanation;Demonstration;Action Demonstration   Exercises - Seated Patient Response Patient;Acceptance;Explanation;Demonstration;Action Demonstration   Weight Bearing Status Patient Response Patient;Acceptance;Explanation;Demonstration;Action Demonstration   Additional Comments pt receptive of edu provided   Interdisciplinary Plan of Care Collaboration   IDT Collaboration with  Nursing   Patient Position at End of Therapy In Bed;Bed Alarm On;Call Light within Reach;Tray Table within Reach;Phone within Reach   Collaboration Comments regarding outcome of tx session   Session Information   Date / Session Number  7/22- 4 (3/3, 7/24)

## 2022-07-22 NOTE — DISCHARGE PLANNING
Case Management Discharge Planning    Admission Date: 7/10/2022  GMLOS: 9.6  ALOS: 12    6-Clicks ADL Score: 14  6-Clicks Mobility Score: 6  PT and/or OT Eval ordered: Yes  Post-acute Referrals Ordered: Yes  Post-acute Choice Obtained: Yes  Has referral(s) been sent to post-acute provider:  Yes      Anticipated Discharge Dispo:  SNF, maybe lease bed    DME Needed: No     Action(s) Taken: Patient has been declined at all SNF because of insurance. CM contacted leadership to inquire about Lease bed. Asked MD for an order for a second Covid booster.    Escalations Completed: Leadership    Medically Clear: Yes    Next Steps: placement    Barriers to Discharge: Pending Placement    Is the patient up for discharge tomorrow: No

## 2022-07-22 NOTE — PROGRESS NOTES
"   Orthopaedic PA Progress Note    Interval changes: Left revision TKA with spacer on 7/19 with Dr. Luz. Drain removed today. Wound vac in place with hinged knee brace applied earlier today. She reports having pain controlled with pain meds. Also s/p left shoulder I&D with Dr. Gray on 7/17 for septic arthritis with associated abscess. SONY drain remains in place with small amount of serosanguinous drainage. Still no growth on cultures from knee or shoulder.    ROS - Patient denies any new issues. No chest pain, dyspnea, or fever.  Pain controlled.    Objective:  /74   Pulse 78   Temp 37.1 °C (98.8 °F) (Temporal)   Resp 18   Ht 1.715 m (5' 7.5\")   Wt 85.7 kg (188 lb 15 oz)   SpO2 99%   General: NAD, alert. Easily awakes and follows commands.   LUE: Shoulder dressing c/d/i. Serosanguinous drainage from SONY drain. Neurovascularly intact distally.   LLE: Preveena holding suction. Hinged knee brace in place. Neurovascularly intact distally.     Active Hospital Problems    Diagnosis    • Hypertension [I10]      Priority: Medium   • Controlled type 2 diabetes mellitus with hyperglycemia, without long-term current use of insulin (HCC) [E11.65]      Priority: Medium   • Cirrhosis (HCC) [K74.60]      Priority: Low   • Septic arthritis of knee, left (HCC) [M00.9]    • Septic arthritis of shoulder, left (HCC) [M00.9]    • Iron deficiency anemia [D50.9]    • Acute cystitis without hematuria [N30.00]    • Bacteremia [R78.81]    • Hyponatremia [E87.1]    • ELIZABETH (acute kidney injury) (HCC) [N17.9]    • Hyperlipidemia [E78.5]    • Metabolic encephalopathy [G93.41]    • Atrial fibrillation (HCC) [I48.91]        Assessment/Plan:  POD#1/3  Wt bearing status - WBAT UE, TTWB LLE  PT/OT-initiated  Wound care:dressings left in place  Drains - HVs to spring, Preveena to remain in place for 7 days total then transition to dry dressing  Amaro-no  Sutures/Staples out- 10-14 days post operatively  Antibiotics: appreciate ID " recommendations  DVT Prophylaxis- TEDS/SCDs/Foot pumps.   Lovenox: Start 40 mg daily, Duration-until ambulatory > 150'  Future Procedures - pending resolution of infection + 2 wks minimum, Knee: Patient will likely have extended course of antibiotic spacer with possible exchange spacer versus reimplantation at 3 months post-op  Case Coordination for Discharge Planning - Disposition per Med/ ID  May call Dr. Luz with any questions regarding the knee revision.    Supervising Physicians, Dr. Wild Luz and Dr. Obdulio Gray

## 2022-07-22 NOTE — CARE PLAN
"The patient is Stable - Low risk of patient condition declining or worsening    Shift Goals  Clinical Goals: pain management  Patient Goals: rest, pain management  Family Goals: n/a    Progress made toward(s) clinical / shift goals:    Problem: Fall Risk  Goal: Patient will remain free from falls  Outcome: Progressing  Note: Patient calls appropriately and doesn't attemtp to get OOB.       Patient is not progressing towards the following goals:      Problem: Pain - Standard  Goal: Alleviation of pain or a reduction in pain to the patient’s comfort goal  Outcome: Not Progressing  Note: Patient reported oxy and tylenol helped \"a little\" but stated pain 10/10.  Patient stated \"During the day I'm only going to take tylenol for pain so I can get myself better\".     "

## 2022-07-22 NOTE — PROGRESS NOTES
"   Orthopaedic PA Progress Note    Interval changes: Left revision TKA with spacer on 7/19 with Dr. Luz.Prevena in place with hinged knee brace to left knee. Also s/p left shoulder I&D with Dr. Gray on 7/17 for septic arthritis with associated abscess. SONY drain remains in place with small amount of serosanguinous drainage. Still no growth on cultures from knee or shoulder.   Her pain was 10/10 last night according to the nurse but she reports it has improved this morning. She would like to limit her pain medicine to tylenol only, but has not been able to fully transition to that yet.     ROS - Patient denies any new issues. No chest pain, dyspnea, or fever.  Pain sometimes controlled.    Objective:  /74   Pulse 60   Temp 36.2 °C (97.1 °F) (Temporal)   Resp 18   Ht 1.715 m (5' 7.5\")   Wt 85.7 kg (188 lb 15 oz)   SpO2 98%   General: NAD, alert. Easily awakes and follows commands.   LUE: Shoulder dressing c/d/i. Serosanguinous drainage from SONY drain. Neurovascularly intact distally.   LLE: Preveena holding suction. Hinged knee brace in place. Neurovascularly intact distally.     Active Hospital Problems    Diagnosis    • Hypertension [I10]      Priority: Medium   • Controlled type 2 diabetes mellitus with hyperglycemia, without long-term current use of insulin (HCC) [E11.65]      Priority: Medium   • Cirrhosis (HCC) [K74.60]      Priority: Low   • Septic arthritis of knee, left (HCC) [M00.9]    • Septic arthritis of shoulder, left (HCC) [M00.9]    • Iron deficiency anemia [D50.9]    • Acute cystitis without hematuria [N30.00]    • Bacteremia [R78.81]    • Hyponatremia [E87.1]    • ELIZABETH (acute kidney injury) (HCC) [N17.9]    • Hyperlipidemia [E78.5]    • Metabolic encephalopathy [G93.41]    • Atrial fibrillation (HCC) [I48.91]        Assessment/Plan:  POD#3/5  Wt bearing status - WBAT UE, TTWB LLE  PT/OT-initiated/pending eval  Wound care:dressings left in place  Drains - HVs to spring, Preveena to " remain in place for 7 days total then transition to dry dressing  Amaro-no  Sutures/Staples out- 10-14 days post operatively  Antibiotics: appreciate ID recommendations  DVT Prophylaxis- TEDS/SCDs/Foot pumps.   Lovenox: Start 40 mg daily, Duration-until ambulatory > 150'  Future Procedures - pending resolution of infection + 2 wks minimum, Knee: Patient will likely have extended course of antibiotic spacer with possible exchange spacer versus reimplantation at 3 months post-op  Case Coordination for Discharge Planning - Disposition per Med/ ID  May call Dr. Luz with any questions regarding the knee revision.    Supervising Physicians, Dr. Wild Luz and Dr. Obdulio Gray

## 2022-07-22 NOTE — PROGRESS NOTES
62yoF with multiple medical issues admitted recently with sepsis.  Has infected left TKA s/p abx spacer by Dr. Luz 7/19.  Also has left shoulder septic arthritis with associated abscess s/p I&D 7/17.      S: Says she is doing okay, trying to minimize taking pain meds.    O:    Vitals:    07/22/22 0743   BP: 116/74   Pulse: 60   Resp: 18   Temp: 36.2 °C (97.1 °F)   SpO2: 98%     Exam:  General-NAD, following commands  LUE-shoulder dressing c/d/i, SONY drain with serosanguinous drainage NVI distally  LLE-knee dressing c/d/i, brace in place, NVI distally    A: 62yoF with multiple medical issues admitted recently with sepsis.  Has infected left TKA s/p abx spacer by Dr. Luz 7/19.  Also has left shoulder septic arthritis with associated abscess s/p I&D 7/17.      Recs:  --continue abx per ID recs   --continue SONY drain shoulder for now  --TTWB LLE, WBAT LUE  --fu 2 weeks postop

## 2022-07-23 LAB
ALBUMIN SERPL BCP-MCNC: 1.7 G/DL (ref 3.2–4.9)
ALBUMIN SERPL BCP-MCNC: 1.8 G/DL (ref 3.2–4.9)
ALBUMIN/GLOB SERPL: 0.5 G/DL
ALBUMIN/GLOB SERPL: 0.5 G/DL
ALP SERPL-CCNC: 122 U/L (ref 30–99)
ALP SERPL-CCNC: 131 U/L (ref 30–99)
ALT SERPL-CCNC: 21 U/L (ref 2–50)
ALT SERPL-CCNC: 24 U/L (ref 2–50)
ANION GAP SERPL CALC-SCNC: 6 MMOL/L (ref 7–16)
ANION GAP SERPL CALC-SCNC: 6 MMOL/L (ref 7–16)
ANION GAP SERPL CALC-SCNC: 9 MMOL/L (ref 7–16)
AST SERPL-CCNC: 20 U/L (ref 12–45)
AST SERPL-CCNC: 20 U/L (ref 12–45)
BILIRUB SERPL-MCNC: 0.4 MG/DL (ref 0.1–1.5)
BILIRUB SERPL-MCNC: 0.5 MG/DL (ref 0.1–1.5)
BUN SERPL-MCNC: 29 MG/DL (ref 8–22)
BUN SERPL-MCNC: 31 MG/DL (ref 8–22)
BUN SERPL-MCNC: 31 MG/DL (ref 8–22)
CALCIUM SERPL-MCNC: 7.4 MG/DL (ref 8.5–10.5)
CALCIUM SERPL-MCNC: 7.4 MG/DL (ref 8.5–10.5)
CALCIUM SERPL-MCNC: 7.7 MG/DL (ref 8.5–10.5)
CHLORIDE SERPL-SCNC: 109 MMOL/L (ref 96–112)
CO2 SERPL-SCNC: 16 MMOL/L (ref 20–33)
CO2 SERPL-SCNC: 18 MMOL/L (ref 20–33)
CO2 SERPL-SCNC: 18 MMOL/L (ref 20–33)
CREAT SERPL-MCNC: 0.82 MG/DL (ref 0.5–1.4)
ERYTHROCYTE [DISTWIDTH] IN BLOOD BY AUTOMATED COUNT: 46.2 FL (ref 35.9–50)
GFR SERPLBLD CREATININE-BSD FMLA CKD-EPI: 81 ML/MIN/1.73 M 2
GFR SERPLBLD CREATININE-BSD FMLA CKD-EPI: 81 ML/MIN/1.73 M 2
GLOBULIN SER CALC-MCNC: 3.5 G/DL (ref 1.9–3.5)
GLOBULIN SER CALC-MCNC: 3.6 G/DL (ref 1.9–3.5)
GLUCOSE BLD STRIP.AUTO-MCNC: 105 MG/DL (ref 65–99)
GLUCOSE BLD STRIP.AUTO-MCNC: 130 MG/DL (ref 65–99)
GLUCOSE BLD STRIP.AUTO-MCNC: 146 MG/DL (ref 65–99)
GLUCOSE BLD STRIP.AUTO-MCNC: 167 MG/DL (ref 65–99)
GLUCOSE BLD STRIP.AUTO-MCNC: 202 MG/DL (ref 65–99)
GLUCOSE SERPL-MCNC: 120 MG/DL (ref 65–99)
GLUCOSE SERPL-MCNC: 120 MG/DL (ref 65–99)
GLUCOSE SERPL-MCNC: 143 MG/DL (ref 65–99)
HCT VFR BLD AUTO: 24.6 % (ref 37–47)
HGB BLD-MCNC: 8.1 G/DL (ref 12–16)
MCH RBC QN AUTO: 28.4 PG (ref 27–33)
MCHC RBC AUTO-ENTMCNC: 32.9 G/DL (ref 33.6–35)
MCV RBC AUTO: 86.3 FL (ref 81.4–97.8)
PLATELET # BLD AUTO: 212 K/UL (ref 164–446)
PMV BLD AUTO: 9 FL (ref 9–12.9)
POTASSIUM SERPL-SCNC: 4.4 MMOL/L (ref 3.6–5.5)
PROT SERPL-MCNC: 5.2 G/DL (ref 6–8.2)
PROT SERPL-MCNC: 5.4 G/DL (ref 6–8.2)
RBC # BLD AUTO: 2.85 M/UL (ref 4.2–5.4)
SODIUM SERPL-SCNC: 133 MMOL/L (ref 135–145)
SODIUM SERPL-SCNC: 133 MMOL/L (ref 135–145)
SODIUM SERPL-SCNC: 134 MMOL/L (ref 135–145)
WBC # BLD AUTO: 8 K/UL (ref 4.8–10.8)

## 2022-07-23 PROCEDURE — A9270 NON-COVERED ITEM OR SERVICE: HCPCS

## 2022-07-23 PROCEDURE — 700102 HCHG RX REV CODE 250 W/ 637 OVERRIDE(OP): Performed by: STUDENT IN AN ORGANIZED HEALTH CARE EDUCATION/TRAINING PROGRAM

## 2022-07-23 PROCEDURE — 700102 HCHG RX REV CODE 250 W/ 637 OVERRIDE(OP): Performed by: HOSPITALIST

## 2022-07-23 PROCEDURE — 36415 COLL VENOUS BLD VENIPUNCTURE: CPT

## 2022-07-23 PROCEDURE — 700111 HCHG RX REV CODE 636 W/ 250 OVERRIDE (IP): Performed by: INTERNAL MEDICINE

## 2022-07-23 PROCEDURE — 99232 SBSQ HOSP IP/OBS MODERATE 35: CPT | Performed by: STUDENT IN AN ORGANIZED HEALTH CARE EDUCATION/TRAINING PROGRAM

## 2022-07-23 PROCEDURE — 80053 COMPREHEN METABOLIC PANEL: CPT

## 2022-07-23 PROCEDURE — A9270 NON-COVERED ITEM OR SERVICE: HCPCS | Performed by: STUDENT IN AN ORGANIZED HEALTH CARE EDUCATION/TRAINING PROGRAM

## 2022-07-23 PROCEDURE — 700101 HCHG RX REV CODE 250

## 2022-07-23 PROCEDURE — 82962 GLUCOSE BLOOD TEST: CPT | Mod: 91

## 2022-07-23 PROCEDURE — 770001 HCHG ROOM/CARE - MED/SURG/GYN PRIV*

## 2022-07-23 PROCEDURE — 85027 COMPLETE CBC AUTOMATED: CPT

## 2022-07-23 PROCEDURE — 700102 HCHG RX REV CODE 250 W/ 637 OVERRIDE(OP)

## 2022-07-23 PROCEDURE — A9270 NON-COVERED ITEM OR SERVICE: HCPCS | Performed by: HOSPITALIST

## 2022-07-23 RX ORDER — OMEPRAZOLE 20 MG/1
20 CAPSULE, DELAYED RELEASE ORAL DAILY
Status: DISCONTINUED | OUTPATIENT
Start: 2022-07-24 | End: 2022-07-25

## 2022-07-23 RX ORDER — PANTOPRAZOLE SODIUM 40 MG/1
40 TABLET, DELAYED RELEASE ORAL DAILY
Status: DISCONTINUED | OUTPATIENT
Start: 2022-07-23 | End: 2022-07-23

## 2022-07-23 RX ADMIN — APIXABAN 5 MG: 5 TABLET, FILM COATED ORAL at 20:25

## 2022-07-23 RX ADMIN — ACETAMINOPHEN 650 MG: 325 TABLET, FILM COATED ORAL at 02:26

## 2022-07-23 RX ADMIN — LIDOCAINE 1 PATCH: 700 PATCH TOPICAL at 14:53

## 2022-07-23 RX ADMIN — CEFTRIAXONE SODIUM 2 G: 10 INJECTION, POWDER, FOR SOLUTION INTRAVENOUS at 16:12

## 2022-07-23 RX ADMIN — CALCIUM CARBONATE 500 MG: 500 TABLET, CHEWABLE ORAL at 17:43

## 2022-07-23 RX ADMIN — ACETAMINOPHEN 650 MG: 325 TABLET, FILM COATED ORAL at 14:57

## 2022-07-23 RX ADMIN — OXYCODONE 5 MG: 5 TABLET ORAL at 08:52

## 2022-07-23 RX ADMIN — CALCIUM CARBONATE 500 MG: 500 TABLET, CHEWABLE ORAL at 07:51

## 2022-07-23 RX ADMIN — ACETAMINOPHEN 650 MG: 325 TABLET, FILM COATED ORAL at 08:52

## 2022-07-23 RX ADMIN — APIXABAN 5 MG: 5 TABLET, FILM COATED ORAL at 08:00

## 2022-07-23 RX ADMIN — OXYCODONE 5 MG: 5 TABLET ORAL at 14:53

## 2022-07-23 RX ADMIN — OXYCODONE 5 MG: 5 TABLET ORAL at 04:26

## 2022-07-23 RX ADMIN — ACETAMINOPHEN 650 MG: 325 TABLET, FILM COATED ORAL at 22:17

## 2022-07-23 RX ADMIN — INSULIN HUMAN 6 UNITS: 100 INJECTION, SOLUTION PARENTERAL at 20:40

## 2022-07-23 RX ADMIN — ATORVASTATIN CALCIUM 20 MG: 20 TABLET, FILM COATED ORAL at 20:25

## 2022-07-23 RX ADMIN — DRONEDARONE 400 MG: 400 TABLET, FILM COATED ORAL at 07:48

## 2022-07-23 RX ADMIN — OXYCODONE 5 MG: 5 TABLET ORAL at 20:23

## 2022-07-23 RX ADMIN — LOSARTAN POTASSIUM 25 MG: 50 TABLET, FILM COATED ORAL at 04:26

## 2022-07-23 RX ADMIN — CALCIUM CARBONATE 500 MG: 500 TABLET, CHEWABLE ORAL at 20:33

## 2022-07-23 RX ADMIN — LACTULOSE 15 ML: 20 SOLUTION ORAL at 04:26

## 2022-07-23 ASSESSMENT — PAIN DESCRIPTION - PAIN TYPE
TYPE: CHRONIC PAIN
TYPE: SURGICAL PAIN
TYPE: CHRONIC PAIN
TYPE: CHRONIC PAIN

## 2022-07-23 ASSESSMENT — ENCOUNTER SYMPTOMS
VOMITING: 0
DOUBLE VISION: 0
NAUSEA: 0
CHILLS: 0
MYALGIAS: 1
FEVER: 0
ABDOMINAL PAIN: 1
LOSS OF CONSCIOUSNESS: 0
DIARRHEA: 0
SHORTNESS OF BREATH: 0
BACK PAIN: 1
SORE THROAT: 0
BLURRED VISION: 0
HEADACHES: 0
DIZZINESS: 0
PALPITATIONS: 0
COUGH: 0

## 2022-07-23 ASSESSMENT — FIBROSIS 4 INDEX: FIB4 SCORE: 1.19

## 2022-07-23 NOTE — PROGRESS NOTES
Hospital Medicine Daily Progress Note    Date of Service  7/23/2022    Chief Complaint  April Alicia is a 62 y.o. female admitted 7/10/2022 with hypotension, AMS    Hospital Course  61yo PMHx coccaine and ETOH abuse though sober x 8 years per her report, cirrhosis, asthma, AFib on apixaban, DM, HTN. Took an unknown narcotic.  In ED responded to narcan, labs showing Na 121, Cl 89, Creat 5.35, CXR showing cardiomegally.  Admitted to the floor with AMS, HypoNa, dehydration, sepsis from urinary source.  Subsequent blood cultures + Strep Species    Bacteremia with group B streptococcal bacteremia. ID consulted. -on Rocephin, repeated blood culture NTD. TTE showed  EF of 60%, negative for vegetation.     ELIZABETH-resolved    Hx of L knee arthroplasty, left knee pain and swelling. Xray notes New lucency adjacent to the tibial component of arthroplasty suspicious for loosening or infection. Orthopedics consulted, s/p L knee arthrocentesis 7/16 c/w infection. s/p L knee revision, total arthroplasty with antibiotic spacer and wound vac 7/19.     MRI left shoulder showed possible septic arthritis/bursitis. s/p Left shoulder arthrotomy and lavage for infection with partial   Synovectomy. Left shoulder incision and drainage of the subdeltoid abscess 7/17.    ID rec cont ceftriaxone 2gm daily end 8/30/2022 and then follow up in ID clinic at the end of antibiotics course. Need potential aspiration of the knee to ensure no infection prior to replacing the hardware.     Interval Problem Update  No acute overnight events. VS stable. Daily bowel movements  On SONY drain 25cc out  ID rec cont ceftriaxone 8/30/22  Ortho following  SNF pending    I have discussed this patient's plan of care and discharge plan at IDT rounds today with Case Management, Nursing, Nursing leadership, and other members of the IDT team.    Consultants/Specialty  ID  ortho    Code Status  Full Code    Disposition  Patient is not medically cleared for  discharge.   Anticipate discharge to to skilled nursing facility.  I have placed the appropriate orders for post-discharge needs.    Review of Systems  Review of Systems   Constitutional: Positive for malaise/fatigue. Negative for chills and fever.   HENT: Negative for nosebleeds and sore throat.    Eyes: Negative for blurred vision and double vision.   Respiratory: Negative for cough and shortness of breath.    Cardiovascular: Negative for chest pain, palpitations and leg swelling.   Gastrointestinal: Positive for abdominal pain. Negative for diarrhea, nausea and vomiting.   Genitourinary: Negative for dysuria.   Musculoskeletal: Positive for back pain, joint pain and myalgias.        L arm pain; left knee pain and swelling    Skin: Negative for rash.   Neurological: Negative for dizziness, loss of consciousness and headaches.        Physical Exam  Temp:  [36.4 °C (97.6 °F)-37.6 °C (99.7 °F)] 36.4 °C (97.6 °F)  Pulse:  [73-95] 82  Resp:  [16-18] 16  BP: (100-107)/(57-82) 100/64  SpO2:  [96 %-98 %] 96 %    Physical Exam  Vitals reviewed.   Constitutional:       General: She is not in acute distress.     Appearance: She is well-developed. She is ill-appearing. She is not diaphoretic.   HENT:      Head: Normocephalic and atraumatic.      Mouth/Throat:      Mouth: Mucous membranes are moist.   Eyes:      Extraocular Movements: Extraocular movements intact.      Conjunctiva/sclera: Conjunctivae normal.   Neck:      Vascular: No JVD.   Cardiovascular:      Rate and Rhythm: Normal rate and regular rhythm.      Pulses: Normal pulses.   Pulmonary:      Effort: Pulmonary effort is normal. No respiratory distress.      Breath sounds: No stridor. No wheezing or rales.   Abdominal:      Palpations: Abdomen is soft.      Tenderness: There is no abdominal tenderness. There is no guarding or rebound.   Musculoskeletal:         General: Swelling and tenderness present.      Cervical back: Neck supple. No rigidity or tenderness.       Right lower leg: No edema.      Left lower leg: No edema.      Comments: Left knee swelling, tenderness, status post surgery, on dressing, clean  Left shoulder clean dressing, SONY drain in place     Skin:     General: Skin is warm and dry.   Neurological:      Mental Status: She is alert and oriented to person, place, and time. Mental status is at baseline.   Psychiatric:      Comments: Anxious         Fluids    Intake/Output Summary (Last 24 hours) at 7/23/2022 1012  Last data filed at 7/23/2022 0852  Gross per 24 hour   Intake 570 ml   Output 40 ml   Net 530 ml       Laboratory  Recent Labs     07/23/22  0230   WBC 8.0   RBC 2.85*   HEMOGLOBIN 8.1*   HEMATOCRIT 24.6*   MCV 86.3   MCH 28.4   MCHC 32.9*   RDW 46.2   PLATELETCT 212   MPV 9.0     Recent Labs     07/23/22  0230   SODIUM 133*  133*   POTASSIUM 4.4  4.4   CHLORIDE 109  109   CO2 18*  18*   GLUCOSE 120*  120*   BUN 31*  31*   CREATININE 0.82  0.82   CALCIUM 7.4*  7.4*                   Imaging  IR-PICC LINE PLACEMENT W/ GUIDANCE > AGE 5   Final Result                  Ultrasound-guided PICC placement performed by qualified nursing staff as    above.          DX-CHEST-FOR LINE PLACEMENT Perform procedure in: Patient's Room   Final Result      1.  Peripherally inserted catheter has been placed and the tip projects appropriately over the superior vena cava.   2.  Stable enlargement of the cardiomediastinal silhouette.   3.  Stable patchy bilateral interstitial opacities.      DX-KNEE 2- LEFT   Final Result      Status post removal of the tibial component of the left knee arthroplasty      MR-SHOULDER-W/O LEFT   Final Result      1.  Large joint effusion with a large amount of fluid seen extending into the subacromial/subdeltoid bursa with synovitis and small low signal foci within the bursa consistent with pockets of gas. These findings are very suspicious for septic arthritis    and septic bursitis.      2.  Complete full-thickness tear of the  supraspinatus tendon with retraction to the bony glenoid.      3.  Full-thickness tear portion of the subscapularis tendon.      4.  Partial-thickness tear of the articular surface fibers of the infraspinatus tendon.      5.  Prominent muscle edema and atrophy involving the rotator cuff as well as all the muscles surrounding the shoulder which may represent myositis.      6.  Osteoarthritis of the glenohumeral joint.      7.  Diffuse tearing of the glenoid labrum.      8.  Subluxation of the long head of the biceps tendon medially from the bicipital groove with marked thinning of the intra-articular portion consistent with tendinosis.      9.  Extensive soft tissue edema to include fat and muscle involving all of the imaged structures around the shoulder likely representing cellulitis.      10.  Widening of the acromioclavicular joint with fragmentation of the distal clavicle consistent with old posttraumatic change versus surgical change.      IE-[id number] VIEW   Final Result      Nonobstructive bowel gas pattern.      EC-ECHOCARDIOGRAM COMPLETE W/O CONT   Final Result      DX-CHEST-PORTABLE (1 VIEW)   Final Result      1.  Interstitial infiltrates versus edema.   2.  Cardiomegaly.      DX-KNEE 2- RIGHT   Final Result      1.  Negative for fracture or malalignment      2.  Right knee arthroplasty appears within normal limits      DX-KNEE 2- LEFT   Final Result      1.  New lucency adjacent to the tibial component of arthroplasty suspicious for loosening or infection      2.  No other finding      US-EXTREMITY VENOUS UPPER UNILAT LEFT   Final Result      US-RENAL   Final Result      1.  Unremarkable kidneys.   2.  Limited evaluation of the bladder.      CT-ABDOMEN-PELVIS W/O   Final Result         Limited exam due to lack of IV contrast.         1. Mild diffuse wall thickening of the urinary bladder could relate to infection or inflammation. Correlate with UA. No hydronephrosis.   2. Nonspecific mildly prominent  fluid-filled proximal small bowel in the left abdomen. This could be seen with enteritis, focal ileus or early obstruction.   3. Cirrhotic liver with portal hypertension and splenomegaly. Wall thickening of the ascending colon could relate to portal hypertension.   4. No ascites.      DX-SHOULDER 2+ LEFT   Final Result      1.  No evidence of acute fracture or dislocation.      2.  Old posttraumatic changes of the distal clavicle.      DX-CHEST-PORTABLE (1 VIEW)   Final Result      Cardiomegaly.           Assessment/Plan  * Bacteremia  Assessment & Plan  2 bottles with group B strep   Patient reported history of severe bacteremia a year ago after knee surgery last year, which required skin grafting of both lower extremity and was hospitalized at [place] when she visited her son there  Repeat blood cultures NTD    Cont Rocephin  Check TTE negative for vegetation  ID rec cont ceftriaxone 2gm daily end 8/30/2022 and then follow up in ID clinic at the end of antibiotics course. Need potential aspiration of the knee to ensure no infection prior to replacing the hardware.       Septic arthritis of knee, left ([facility], [facility])  Assessment & Plan  History of left knee replacement 2017, which was complicated with infection 2 weeks later  Patient had recurrent left knee infection last year which was complicated with bacteremia and she was hospitalized at the [place] hospital when she visited her son     Xray notes New lucency adjacent to the tibial component of arthroplasty suspicious for loosening or infection.   Orthopedics consulted, s/p L knee arthrocentesis 7/16 c/w infection.   s/p L knee resection and antibiotic spacer 7/19  ID rec cont ceftriaxone 2gm daily end 8/30/2022 and then follow up in ID clinic at the end of antibiotics course. Need potential aspiration of the knee to ensure no infection prior to replacing the hardware.     Septic arthritis of shoulder, left (HCC)  Assessment & Plan  MRI left shoulder showed septic  arthritis/subdeltoid abscess    Ortho consulted,  s/p Left shoulder arthrotomy and lavage for infection with partial synovectomy. Left shoulder incision and drainage of the subdeltoid abscess 7/17.  SONY drain in place  fluid culture NTD  On Rocephin.     Iron deficiency anemia  Assessment & Plan  Iron study consistent with iron deficiency  Hgb 10.7 at admission, stable    holding IV iron replacement given ongoing infection    Hyponatremia  Assessment & Plan  123 on admission  High SG on UA: probable hypovolemic and alcohol use  Cont to monitor    Resolved    Acute cystitis without hematuria  Assessment & Plan  On Rocephin for bacteremia    Metabolic encephalopathy  Assessment & Plan  Likely multifactorial in conjunction with narcotic use  S/p Narcan treatment, improved  Start lacutlose  Cont to treat infection     resolved      Hyperlipidemia- (present on admission)  Assessment & Plan  Continue Lipitor    ELIZABETH (acute kidney injury) (HCC)- (present on admission)  Assessment & Plan  Likely secondary dehydration  4.7 on admission   Good UOP  DC IVFs  Cont to monitor  Renally dose medications as appropriate    Resolved    Atrial fibrillation (HCC)- (present on admission)  Assessment & Plan  On apixaban and dronedarone as outpt  Eliquis resumed post surgery  Rate controlled     Cirrhosis (HCC)- (present on admission)  Assessment & Plan  Secondary to ETOH  Lactulose  monitor    Hypertension- (present on admission)  Assessment & Plan  Resume the home losartan    Controlled type 2 diabetes mellitus with hyperglycemia, without long-term current use of insulin (HCC)- (present on admission)  Assessment & Plan  Sliding-scale insulin, diabetic diet  A1c 5.7%         VTE prophylaxis: therapeutic anticoagulation with Eliquis    I have performed a physical exam and reviewed and updated ROS and Plan today (7/23/2022). In review of yesterday's note (7/22/2022), there are no changes except as documented above.

## 2022-07-23 NOTE — CARE PLAN
The patient is Stable - Low risk of patient condition declining or worsening    Shift Goals  Clinical Goals: pain management  Patient Goals: pain management, rest  Family Goals: n/a    Progress made toward(s) clinical / shift goals:    Problem: Knowledge Deficit - Standard  Goal: Patient and family/care givers will demonstrate understanding of plan of care, disease process/condition, diagnostic tests and medications  Outcome: Progressing  Note: Patient verbalizes that she is very motivated to work with PT to get better and stronger.       Problem: Pain - Standard  Goal: Alleviation of pain or a reduction in pain to the patient’s comfort goal  Outcome: Progressing  Note: Pain tolerable with oxycodone and tylenol

## 2022-07-23 NOTE — PROGRESS NOTES
Surgery:  Left TKA resection and antibiotic spacer  Date of surgery: 7/19/22    Subjective: s/p L knee resection and antibiotic spacer. Pain relatively well controlled today. Hemovac removed.      Objective:   General: awake, alert, no distress  MSK:   LLE demonstrates dressing is intact and clean and dry. Preveena dressing is holding suction. She demonstrates EHL, FHL, TA and GSC> Grossly intact sensation in L2-S1 distribution. Toes warm and well perfused.     Cultures: NGTD from knee          Labs:   Lab Results   Component Value Date/Time    WBC 11.5 (H) 07/20/2022 03:23 AM    RBC 3.18 (L) 07/20/2022 03:23 AM    HEMOGLOBIN 8.9 (L) 07/20/2022 03:23 AM    HEMATOCRIT 28.2 (L) 07/20/2022 03:23 AM    MCV 88.7 07/20/2022 03:23 AM    MCH 28.0 07/20/2022 03:23 AM    MCHC 31.6 (L) 07/20/2022 03:23 AM    MPV 9.1 07/20/2022 03:23 AM    NEUTSPOLYS 85.30 (H) 07/19/2022 01:05 AM    LYMPHOCYTES 8.10 (L) 07/19/2022 01:05 AM    MONOCYTES 5.00 07/19/2022 01:05 AM    EOSINOPHILS 0.40 07/19/2022 01:05 AM    BASOPHILS 0.10 07/19/2022 01:05 AM    HYPOCHROMIA 1+ 03/12/2009 06:50 PM    ANISOCYTOSIS 1+ 06/11/2006 05:30 AM        Assessment/Plan:   s/p L knee resection and antibiotic spacer. Doing well.   1. TTWB LLE in knee immobilizer  2. Preveena dressing to remain in place x7 days then transition to dry dressing  3. Staples to stay until 14 days post op  4. DVT ppx: per primary; likely lovenox 40 mg daily  5. Further procedures pending resolution of infection.   6. Appreciate ID recs  7. Patient will likely have extended course of antibiotic spacer with possible exchange spacer versus reimplantation at 3 months post-op  8. Call me with any questions/concerns in the interim. Patient will need follow up in my clinic at 2 weeks if she is discharged prior to that.

## 2022-07-23 NOTE — CARE PLAN
The patient is Stable - Low risk of patient condition declining or worsening    Shift Goals  Clinical Goals: adequate pain control; continue measures for infection improvement/healing; encourage activity per MD ordered limits.  Patient Goals: pain control; heartburn medicine.  Family Goals: n/a    Progress made toward(s) clinical / shift goals:  April does continue to report pain level 9-10/10 with prn tylenol and oxycodone, but is still able to rest and fall asleep. SONY to left shoulder/arm with only 20 ml of serous-serosanguineous drainage out today. No falls or injuries.     Patient is not progressing towards the following goals: April's mobility is compromised due to her left leg weight bearing limits, and with pain, as well as the pain and limited movement to her left arm. She has not attempted to get out of bed today, and prefers to only do so with PT staff. Left arm with 2+ edema; MD aware and ordered venous duplex to rule out DVT.       Problem: Mobility  Goal: Patient's capacity to carry out activities will improve  Outcome: Not Progressing   Mobility compromised due to her left leg weight bearing limits, and with pain, as well as the pain and limited movement to her left arm. She has not attempted to get out of bed today, and prefers to only do so with PT staff. Left leg with prevena wound vac, wrapped with ACE bandage, and immobilizer on; toe touch weight bearing only to LLE.  Left arm swollen and has limited movement; MD aware, and ordered venous duplex to LUE.    Problem: Self Care  Goal: Patient will have the ability to perform ADLs independently or with assistance (bathe, groom, dress, toilet and feed)  Outcome: Not Progressing   April is dependent for assistance with her ADLs due to her mobility limitations.       Problem: Fall Risk  Goal: Patient will remain free from falls  Outcome: Progressing   No falls or injuries; precautions engaged.    Problem: Hemodynamics  Goal: Patient's  hemodynamics, fluid balance and neurologic status will be stable or improve  Outcome: Progressing   B/p stable; receiving eliquis, dronedarone, and losartan; left arm with 2+ edema, MD ordered venous duplex, waiting to be performed.    Problem: Nutrition  Goal: Patient's nutritional and fluid intake will be adequate or improve  Outcome: Progressing   Good po intake.  Did report some heartburn this morning; prn tums given; April mentions that she normally takes protonix daily when at home, MD ordered prilosec.    Problem: Wound/ / Incision Healing  Goal: Patient's wound/surgical incision will decrease in size and heals properly  Outcome: Progressing   Left leg with prevena wound vac, wrapped with ACE bandage, and immobilizer on.  Left arm/shoulder incision covered with dressing; SONY drain intact with total of 20 ml serous-serosanguineous drainage out today.

## 2022-07-23 NOTE — PROGRESS NOTES
"   Orthopaedic PA Progress Note    Interval changes:  S/P  1. Rev. TKA with spacer 7/19 Dr. Luz.   2. Left shoulder arthrotomy and lavage for infection with partial synovectomy, and 3.  Left shoulder incision and drainage of the subdeltoid abscess Dr. Gray on 7/17.     Shoulder HV 40 mL overnight, left in.    ROS - Patient denies any new issues. No chest pain, dyspnea, or fever.  Pain well controlled.    /64   Pulse 82   Temp 36.4 °C (97.6 °F) (Temporal)   Resp 16   Ht 1.715 m (5' 7.5\")   Wt 85.7 kg (188 lb 15 oz)   SpO2 96%     Patient seen and examined  No acute distress  Breathing non labored  RRR  Surgical dressing is clean, dry, and intact. Patient clearly fires forearm flexors, forearm extensors, and moves all five fingers without issue . Sensation is intact to light touch throughout median, ulnar, and radial nerve distributions. Strong and palpable radial pulses with capillary refill less than 2 seconds. No arm or hand discomfort.  Surgical dressing is clean, dry, and intact. Patient clearly fires tibialis anterior, EHL, and gastrocnemius/soleus. Sensation is intact to light touch throughout superficial peroneal, deep peroneal, tibial, saphenous, and sural nerve distributions. Strong and palpable 2+ dorsalis pedis and posterior tibial pulses with capillary refill less than 2 seconds. No lower leg tenderness or discomfort.    HV: Shoulder 17 mL / 24; knee 0    Recent Labs     07/23/22  0230   WBC 8.0   RBC 2.85*   HEMOGLOBIN 8.1*   HEMATOCRIT 24.6*   MCV 86.3   MCH 28.4   MCHC 32.9*   RDW 46.2   PLATELETCT 212   MPV 9.0       Active Hospital Problems    Diagnosis    • Hypertension [I10]      Priority: Medium   • Controlled type 2 diabetes mellitus with hyperglycemia, without long-term current use of insulin (HCC) [E11.65]      Priority: Medium   • Cirrhosis (HCC) [K74.60]      Priority: Low   • Septic arthritis of knee, left (HCC) [M00.9]    • Septic arthritis of shoulder, left (HCC) [M00.9]  "   • Iron deficiency anemia [D50.9]    • Acute cystitis without hematuria [N30.00]    • Bacteremia [R78.81]    • Hyponatremia [E87.1]    • ELIZABETH (acute kidney injury) (HCC) [N17.9]    • Hyperlipidemia [E78.5]    • Metabolic encephalopathy [G93.41]    • Atrial fibrillation (HCC) [I48.91]        Assessment/Plan:  POD# 4/6  Wt bearing status - WBAT UE, TTWB LLE  PT/OT-initiated  Wound care:dressings left in place  Drains - Knee - removed, shoulder - left in place  Amaro-no  Sutures/Staples out- 14 days post operatively  Antibiotics: per ID  DVT Prophylaxis- TEDS/SCDs/Foot pumps.   Lovenox: Start 40 mg daily, Duration-until ambulatory > 150'  Future Procedures - pending resolution of infection + 2 wks minimum  Case Coordination for Discharge Planning - Disposition per Med/ ID  Clear for disposition (home/SNF/IRF) from Orthopaedic team standpoint.  Follow-up Dr. Luz or Dr. Gray 2 wks postop

## 2022-07-24 ENCOUNTER — APPOINTMENT (OUTPATIENT)
Dept: RADIOLOGY | Facility: MEDICAL CENTER | Age: 62
DRG: 466 | End: 2022-07-24
Attending: STUDENT IN AN ORGANIZED HEALTH CARE EDUCATION/TRAINING PROGRAM
Payer: MEDICAID

## 2022-07-24 LAB
BACTERIA SPEC ANAEROBE CULT: NORMAL
GLUCOSE BLD STRIP.AUTO-MCNC: 101 MG/DL (ref 65–99)
GLUCOSE BLD STRIP.AUTO-MCNC: 127 MG/DL (ref 65–99)
GLUCOSE BLD STRIP.AUTO-MCNC: 141 MG/DL (ref 65–99)
GLUCOSE BLD STRIP.AUTO-MCNC: 164 MG/DL (ref 65–99)
SIGNIFICANT IND 70042: NORMAL
SITE SITE: NORMAL
SOURCE SOURCE: NORMAL

## 2022-07-24 PROCEDURE — 700102 HCHG RX REV CODE 250 W/ 637 OVERRIDE(OP): Performed by: HOSPITALIST

## 2022-07-24 PROCEDURE — 770001 HCHG ROOM/CARE - MED/SURG/GYN PRIV*

## 2022-07-24 PROCEDURE — A9270 NON-COVERED ITEM OR SERVICE: HCPCS

## 2022-07-24 PROCEDURE — 700102 HCHG RX REV CODE 250 W/ 637 OVERRIDE(OP): Performed by: STUDENT IN AN ORGANIZED HEALTH CARE EDUCATION/TRAINING PROGRAM

## 2022-07-24 PROCEDURE — A9270 NON-COVERED ITEM OR SERVICE: HCPCS | Performed by: STUDENT IN AN ORGANIZED HEALTH CARE EDUCATION/TRAINING PROGRAM

## 2022-07-24 PROCEDURE — 93971 EXTREMITY STUDY: CPT | Mod: LT

## 2022-07-24 PROCEDURE — 700102 HCHG RX REV CODE 250 W/ 637 OVERRIDE(OP)

## 2022-07-24 PROCEDURE — 700101 HCHG RX REV CODE 250

## 2022-07-24 PROCEDURE — 700111 HCHG RX REV CODE 636 W/ 250 OVERRIDE (IP): Performed by: INTERNAL MEDICINE

## 2022-07-24 PROCEDURE — 99232 SBSQ HOSP IP/OBS MODERATE 35: CPT | Performed by: STUDENT IN AN ORGANIZED HEALTH CARE EDUCATION/TRAINING PROGRAM

## 2022-07-24 PROCEDURE — 82962 GLUCOSE BLOOD TEST: CPT | Mod: 91

## 2022-07-24 PROCEDURE — A9270 NON-COVERED ITEM OR SERVICE: HCPCS | Performed by: HOSPITALIST

## 2022-07-24 RX ADMIN — ACETAMINOPHEN 650 MG: 325 TABLET, FILM COATED ORAL at 06:29

## 2022-07-24 RX ADMIN — ATORVASTATIN CALCIUM 20 MG: 20 TABLET, FILM COATED ORAL at 20:28

## 2022-07-24 RX ADMIN — OMEPRAZOLE 20 MG: 20 CAPSULE, DELAYED RELEASE ORAL at 05:11

## 2022-07-24 RX ADMIN — OXYCODONE 5 MG: 5 TABLET ORAL at 04:50

## 2022-07-24 RX ADMIN — APIXABAN 5 MG: 5 TABLET, FILM COATED ORAL at 20:28

## 2022-07-24 RX ADMIN — LACTULOSE 15 ML: 20 SOLUTION ORAL at 04:51

## 2022-07-24 RX ADMIN — OXYCODONE 5 MG: 5 TABLET ORAL at 00:27

## 2022-07-24 RX ADMIN — CALCIUM CARBONATE 500 MG: 500 TABLET, CHEWABLE ORAL at 04:08

## 2022-07-24 RX ADMIN — OXYCODONE 5 MG: 5 TABLET ORAL at 18:09

## 2022-07-24 RX ADMIN — ACETAMINOPHEN 650 MG: 325 TABLET, FILM COATED ORAL at 13:59

## 2022-07-24 RX ADMIN — APIXABAN 5 MG: 5 TABLET, FILM COATED ORAL at 08:17

## 2022-07-24 RX ADMIN — OXYCODONE 5 MG: 5 TABLET ORAL at 09:40

## 2022-07-24 RX ADMIN — INSULIN HUMAN 3 UNITS: 100 INJECTION, SOLUTION PARENTERAL at 17:09

## 2022-07-24 RX ADMIN — CEFTRIAXONE SODIUM 2 G: 10 INJECTION, POWDER, FOR SOLUTION INTRAVENOUS at 15:59

## 2022-07-24 RX ADMIN — OXYCODONE 5 MG: 5 TABLET ORAL at 13:59

## 2022-07-24 RX ADMIN — DRONEDARONE 400 MG: 400 TABLET, FILM COATED ORAL at 08:17

## 2022-07-24 RX ADMIN — LIDOCAINE 1 PATCH: 700 PATCH TOPICAL at 13:59

## 2022-07-24 RX ADMIN — CALCIUM CARBONATE 500 MG: 500 TABLET, CHEWABLE ORAL at 20:28

## 2022-07-24 RX ADMIN — OXYCODONE 5 MG: 5 TABLET ORAL at 22:23

## 2022-07-24 RX ADMIN — LOSARTAN POTASSIUM 25 MG: 50 TABLET, FILM COATED ORAL at 04:50

## 2022-07-24 ASSESSMENT — PAIN DESCRIPTION - PAIN TYPE
TYPE: ACUTE PAIN
TYPE: ACUTE PAIN
TYPE: SURGICAL PAIN
TYPE: ACUTE PAIN;CHRONIC PAIN
TYPE: ACUTE PAIN
TYPE: CHRONIC PAIN
TYPE: SURGICAL PAIN

## 2022-07-24 ASSESSMENT — ENCOUNTER SYMPTOMS
LOSS OF CONSCIOUSNESS: 0
NAUSEA: 0
DIZZINESS: 0
VOMITING: 0
HEADACHES: 0
ABDOMINAL PAIN: 1
DIARRHEA: 0
MYALGIAS: 1
CHILLS: 0
COUGH: 0
SORE THROAT: 0
FEVER: 0
PALPITATIONS: 0
DOUBLE VISION: 0
SHORTNESS OF BREATH: 0
BLURRED VISION: 0
BACK PAIN: 1

## 2022-07-24 NOTE — CARE PLAN
The patient is Stable - Low risk of patient condition declining or worsening    Shift Goals  Clinical Goals: continue supportive measures for healing and infection reduction; remain free of falls or injuries; continue with ordered diagnostic tests.  Patient Goals: to sit up at side of bed and try to stand.  Family Goals: n/a    Progress made toward(s) clinical / shift goals:  April was able to sit up at the side of the bed for a couple of hours today.  No falls or injuries. Good appetite.      Patient is not progressing towards the following goals: Still has SONY drain to left shoulder and prevena wound vac to left leg.  Still requires assistance with ADLs due to limited mobility.         Problem: Pain - Standard  Goal: Alleviation of pain or a reduction in pain to the patient’s comfort goal  Outcome: Progressing   Continues to report 8-10/10 pain to either left shoulder, abdomen, or left leg, despite prn oxycodone, tylenol, and lidocaine patch.    Problem: Skin Integrity  Goal: Skin integrity is maintained or improved  Outcome: Progressing   SONY to left shoulder intact with total of 50 ml of serosanguineous drainage out today; incision site covered with dressing, clean-dry-intact. Left leg with prevena wound vac, secured with ACE wrap; clean-dry-intact.    Problem: Fall Risk  Goal: Patient will remain free from falls  Outcome: Progressing   No falls or injuries; precautions engaged.    Problem: Nutrition  Goal: Patient's nutritional and fluid intake will be adequate or improve  Outcome: Progressing   Good appetite.    Problem: Mobility  Goal: Patient's capacity to carry out activities will improve  Outcome: Progressing   Mobility limited due to pain and ordered limitations (toe-touch weight bearing) to left leg.  Needs assistance to dangle at bedside, lie back down, or any out of bed activities.    Problem: Self Care  Goal: Patient will have the ability to perform ADLs independently or with assistance (bathe,  groom, dress, toilet and feed)  Outcome: Progressing   Mobility limited; dependent for assistance with all ADLs.    Problem: Wound/ / Incision Healing  Goal: Patient's wound/surgical incision will decrease in size and heals properly  Outcome: Progressing   SONY to left shoulder intact with total of 50 ml of serosanguineous drainage out today; incision site covered with dressing, clean-dry-intact. Left leg with prevena wound vac, secured with ACE wrap; clean-dry-intact.  Receiving ceftriaxone.

## 2022-07-24 NOTE — CARE PLAN
The patient is Stable - Low risk of patient condition declining or worsening    Shift Goals  Clinical Goals: Pain control, continue measures for infection healing  Patient Goals: Pain control, heart burn medicine  Family Goals: n/a    Progress made toward(s) clinical / shift goals:    Problem: Fall Risk  Goal: Patient will remain free from falls  Outcome: Progressing  Note: Pt educated on use of call light; bed is in lowest position; call light within reach.     Problem: Mobility  Goal: Patient's capacity to carry out activities will improve  Outcome: Progressing  Flowsheets (Taken 7/24/2022 0057)  Level of Mobility: Level IV  Activity Performed: Sitting up in bed  Assistance: Assistance of One       Patient is not progressing towards the following goals:

## 2022-07-24 NOTE — PROGRESS NOTES
Hospital Medicine Daily Progress Note    Date of Service  7/24/2022    Chief Complaint  April Alicia is a 62 y.o. female admitted 7/10/2022 with hypotension, AMS    Hospital Course  61yo PMHx coccaine and ETOH abuse though sober x 8 years per her report, cirrhosis, asthma, AFib on apixaban, DM, HTN. Took an unknown narcotic.  In ED responded to narcan, labs showing Na 121, Cl 89, Creat 5.35, CXR showing cardiomegally.  Admitted to the floor with AMS, HypoNa, dehydration, sepsis from urinary source.  Subsequent blood cultures + Strep Species    Bacteremia with group B streptococcal bacteremia. ID consulted. -on Rocephin, repeated blood culture NTD. TTE showed  EF of 60%, negative for vegetation.     ELIZABETH-resolved    Hx of L knee arthroplasty, left knee pain and swelling. Xray notes New lucency adjacent to the tibial component of arthroplasty suspicious for loosening or infection. Orthopedics consulted, s/p L knee arthrocentesis 7/16 c/w infection. s/p L knee revision, total arthroplasty with antibiotic spacer and wound vac 7/19.     MRI left shoulder showed possible septic arthritis/bursitis. s/p Left shoulder arthrotomy and lavage for infection with partial   Synovectomy. Left shoulder incision and drainage of the subdeltoid abscess 7/17.    ID rec cont ceftriaxone 2gm daily end 8/30/2022 and then follow up in ID clinic at the end of antibiotics course. Need potential aspiration of the knee to ensure no infection prior to replacing the hardware.     Interval Problem Update  No acute overnight events. VS stable. Daily bowel movements  ID rec cont ceftriaxone 8/30/22  Pending placement. Difficult placement due to insurance reason    I have discussed this patient's plan of care and discharge plan at IDT rounds today with Case Management, Nursing, Nursing leadership, and other members of the IDT team.    Consultants/Specialty  ID  ortho    Code Status  Full Code    Disposition  Patient is medically cleared  for discharge.   Anticipate discharge to to skilled nursing facility.  I have placed the appropriate orders for post-discharge needs.    Review of Systems  Review of Systems   Constitutional: Positive for malaise/fatigue. Negative for chills and fever.   HENT: Negative for nosebleeds and sore throat.    Eyes: Negative for blurred vision and double vision.   Respiratory: Negative for cough and shortness of breath.    Cardiovascular: Negative for chest pain, palpitations and leg swelling.   Gastrointestinal: Positive for abdominal pain. Negative for diarrhea, nausea and vomiting.   Genitourinary: Negative for dysuria.   Musculoskeletal: Positive for back pain, joint pain and myalgias.        L arm pain; left knee pain and swelling    Skin: Negative for rash.   Neurological: Negative for dizziness, loss of consciousness and headaches.        Physical Exam  Temp:  [36.8 °C (98.2 °F)-37.6 °C (99.7 °F)] 37.3 °C (99.1 °F)  Pulse:  [82-91] 85  Resp:  [18] 18  BP: ()/(54-81) 133/81  SpO2:  [95 %-98 %] 98 %    Physical Exam  Vitals reviewed.   Constitutional:       General: She is not in acute distress.     Appearance: She is well-developed. She is ill-appearing. She is not diaphoretic.   HENT:      Head: Normocephalic and atraumatic.      Mouth/Throat:      Mouth: Mucous membranes are moist.   Eyes:      Extraocular Movements: Extraocular movements intact.      Conjunctiva/sclera: Conjunctivae normal.   Neck:      Vascular: No JVD.   Cardiovascular:      Rate and Rhythm: Normal rate and regular rhythm.      Pulses: Normal pulses.   Pulmonary:      Effort: Pulmonary effort is normal. No respiratory distress.      Breath sounds: No stridor. No wheezing or rales.   Abdominal:      Palpations: Abdomen is soft.      Tenderness: There is no abdominal tenderness. There is no guarding or rebound.   Musculoskeletal:         General: Swelling and tenderness present.      Cervical back: Neck supple. No rigidity or tenderness.       Right lower leg: No edema.      Left lower leg: No edema.      Comments: Left knee swelling, tenderness, status post surgery, on dressing, clean  Left shoulder clean dressing, SONY drain in place     Skin:     General: Skin is warm and dry.   Neurological:      Mental Status: She is alert and oriented to person, place, and time. Mental status is at baseline.   Psychiatric:      Comments: Anxious         Fluids    Intake/Output Summary (Last 24 hours) at 7/24/2022 1046  Last data filed at 7/24/2022 0823  Gross per 24 hour   Intake 750 ml   Output 1845 ml   Net -1095 ml       Laboratory  Recent Labs     07/23/22  0230   WBC 8.0   RBC 2.85*   HEMOGLOBIN 8.1*   HEMATOCRIT 24.6*   MCV 86.3   MCH 28.4   MCHC 32.9*   RDW 46.2   PLATELETCT 212   MPV 9.0     Recent Labs     07/23/22  0230 07/23/22  1130   SODIUM 133*  133* 134*   POTASSIUM 4.4  4.4 4.4   CHLORIDE 109  109 109   CO2 18*  18* 16*   GLUCOSE 120*  120* 143*   BUN 31*  31* 29*   CREATININE 0.82  0.82 0.82   CALCIUM 7.4*  7.4* 7.7*                   Imaging  IR-PICC LINE PLACEMENT W/ GUIDANCE > AGE 5   Final Result                  Ultrasound-guided PICC placement performed by qualified nursing staff as    above.          DX-CHEST-FOR LINE PLACEMENT Perform procedure in: Patient's Room   Final Result      1.  Peripherally inserted catheter has been placed and the tip projects appropriately over the superior vena cava.   2.  Stable enlargement of the cardiomediastinal silhouette.   3.  Stable patchy bilateral interstitial opacities.      DX-KNEE 2- LEFT   Final Result      Status post removal of the tibial component of the left knee arthroplasty      MR-SHOULDER-W/O LEFT   Final Result      1.  Large joint effusion with a large amount of fluid seen extending into the subacromial/subdeltoid bursa with synovitis and small low signal foci within the bursa consistent with pockets of gas. These findings are very suspicious for septic arthritis    and septic  bursitis.      2.  Complete full-thickness tear of the supraspinatus tendon with retraction to the bony glenoid.      3.  Full-thickness tear portion of the subscapularis tendon.      4.  Partial-thickness tear of the articular surface fibers of the infraspinatus tendon.      5.  Prominent muscle edema and atrophy involving the rotator cuff as well as all the muscles surrounding the shoulder which may represent myositis.      6.  Osteoarthritis of the glenohumeral joint.      7.  Diffuse tearing of the glenoid labrum.      8.  Subluxation of the long head of the biceps tendon medially from the bicipital groove with marked thinning of the intra-articular portion consistent with tendinosis.      9.  Extensive soft tissue edema to include fat and muscle involving all of the imaged structures around the shoulder likely representing cellulitis.      10.  Widening of the acromioclavicular joint with fragmentation of the distal clavicle consistent with old posttraumatic change versus surgical change.      KH-MQUZTOP-5 VIEW   Final Result      Nonobstructive bowel gas pattern.      EC-ECHOCARDIOGRAM COMPLETE W/O CONT   Final Result      DX-CHEST-PORTABLE (1 VIEW)   Final Result      1.  Interstitial infiltrates versus edema.   2.  Cardiomegaly.      DX-KNEE 2- RIGHT   Final Result      1.  Negative for fracture or malalignment      2.  Right knee arthroplasty appears within normal limits      DX-KNEE 2- LEFT   Final Result      1.  New lucency adjacent to the tibial component of arthroplasty suspicious for loosening or infection      2.  No other finding      US-EXTREMITY VENOUS UPPER UNILAT LEFT   Final Result      US-RENAL   Final Result      1.  Unremarkable kidneys.   2.  Limited evaluation of the bladder.      CT-ABDOMEN-PELVIS W/O   Final Result         Limited exam due to lack of IV contrast.         1. Mild diffuse wall thickening of the urinary bladder could relate to infection or inflammation. Correlate with UA.  No hydronephrosis.   2. Nonspecific mildly prominent fluid-filled proximal small bowel in the left abdomen. This could be seen with enteritis, focal ileus or early obstruction.   3. Cirrhotic liver with portal hypertension and splenomegaly. Wall thickening of the ascending colon could relate to portal hypertension.   4. No ascites.      DX-SHOULDER 2+ LEFT   Final Result      1.  No evidence of acute fracture or dislocation.      2.  Old posttraumatic changes of the distal clavicle.      DX-CHEST-PORTABLE (1 VIEW)   Final Result      Cardiomegaly.      US-EXTREMITY VENOUS UPPER UNILAT LEFT    (Results Pending)        Assessment/Plan  * Bacteremia  Assessment & Plan  2 bottles with group B strep   Patient reported history of severe bacteremia a year ago after knee surgery last year, which required skin grafting of both lower extremity and was hospitalized at [place] when she visited her son there  Repeat blood cultures NTD    Cont Rocephin  Check TTE negative for vegetation  ID rec cont ceftriaxone 2gm daily end 8/30/2022 and then follow up in ID clinic at the end of antibiotics course. Need potential aspiration of the knee to ensure no infection prior to replacing the hardware.       Septic arthritis of knee, left (HCC)  Assessment & Plan  History of left knee replacement 2017, which was complicated with infection 2 weeks later  Patient had recurrent left knee infection last year which was complicated with bacteremia and she was hospitalized at the [place] hospital when she visited her son     Xray notes New lucency adjacent to the tibial component of arthroplasty suspicious for loosening or infection.   Orthopedics consulted, s/p L knee arthrocentesis 7/16 c/w infection.   s/p L knee resection and antibiotic spacer 7/19  ID rec cont ceftriaxone 2gm daily end 8/30/2022 and then follow up in ID clinic at the end of antibiotics course. Need potential aspiration of the knee to ensure no infection prior to replacing the  hardware.     Septic arthritis of shoulder, left (HCC)  Assessment & Plan  MRI left shoulder showed septic arthritis/subdeltoid abscess    Ortho consulted,  s/p Left shoulder arthrotomy and lavage for infection with partial synovectomy. Left shoulder incision and drainage of the subdeltoid abscess 7/17.  SONY drain in place  fluid culture NTD  On Rocephin.     Iron deficiency anemia  Assessment & Plan  Iron study consistent with iron deficiency  Hgb 10.7 at admission, stable    holding IV iron replacement given ongoing infection    Hyponatremia  Assessment & Plan  123 on admission  High SG on UA: probable hypovolemic and alcohol use  Cont to monitor    Resolved    Acute cystitis without hematuria  Assessment & Plan  On Rocephin for bacteremia    Metabolic encephalopathy  Assessment & Plan  Likely multifactorial in conjunction with narcotic use  S/p Narcan treatment, improved  Start lacutlose  Cont to treat infection     resolved      Hyperlipidemia- (present on admission)  Assessment & Plan  Continue Lipitor    ELIZABETH (acute kidney injury) (HCC)- (present on admission)  Assessment & Plan  Likely secondary dehydration  4.7 on admission   Good UOP  DC IVFs  Cont to monitor  Renally dose medications as appropriate    Resolved    Atrial fibrillation (HCC)- (present on admission)  Assessment & Plan  On apixaban and dronedarone as outpt  Eliquis resumed post surgery  Rate controlled     Cirrhosis (HCC)- (present on admission)  Assessment & Plan  Secondary to ETOH  Lactulose  monitor    Hypertension- (present on admission)  Assessment & Plan  Resume the home losartan    Controlled type 2 diabetes mellitus with hyperglycemia, without long-term current use of insulin (HCC)- (present on admission)  Assessment & Plan  Sliding-scale insulin, diabetic diet  A1c 5.7%         VTE prophylaxis: therapeutic anticoagulation with Eliquis    I have performed a physical exam and reviewed and updated ROS and Plan today (7/24/2022). In review  of yesterday's note (7/23/2022), there are no changes except as documented above.

## 2022-07-25 LAB
ANION GAP SERPL CALC-SCNC: 7 MMOL/L (ref 7–16)
BUN SERPL-MCNC: 19 MG/DL (ref 8–22)
CALCIUM SERPL-MCNC: 7.5 MG/DL (ref 8.5–10.5)
CHLORIDE SERPL-SCNC: 107 MMOL/L (ref 96–112)
CO2 SERPL-SCNC: 18 MMOL/L (ref 20–33)
CREAT SERPL-MCNC: 0.67 MG/DL (ref 0.5–1.4)
ERYTHROCYTE [DISTWIDTH] IN BLOOD BY AUTOMATED COUNT: 47.6 FL (ref 35.9–50)
FERRITIN SERPL-MCNC: 218 NG/ML (ref 10–291)
GFR SERPLBLD CREATININE-BSD FMLA CKD-EPI: 99 ML/MIN/1.73 M 2
GLUCOSE BLD STRIP.AUTO-MCNC: 121 MG/DL (ref 65–99)
GLUCOSE BLD STRIP.AUTO-MCNC: 121 MG/DL (ref 65–99)
GLUCOSE BLD STRIP.AUTO-MCNC: 171 MG/DL (ref 65–99)
GLUCOSE BLD STRIP.AUTO-MCNC: 94 MG/DL (ref 65–99)
GLUCOSE SERPL-MCNC: 109 MG/DL (ref 65–99)
HCT VFR BLD AUTO: 21.9 % (ref 37–47)
HGB BLD-MCNC: 7.2 G/DL (ref 12–16)
MCH RBC QN AUTO: 28.2 PG (ref 27–33)
MCHC RBC AUTO-ENTMCNC: 32.9 G/DL (ref 33.6–35)
MCV RBC AUTO: 85.9 FL (ref 81.4–97.8)
PLATELET # BLD AUTO: 125 K/UL (ref 164–446)
PMV BLD AUTO: 9.4 FL (ref 9–12.9)
POTASSIUM SERPL-SCNC: 4.3 MMOL/L (ref 3.6–5.5)
RBC # BLD AUTO: 2.55 M/UL (ref 4.2–5.4)
SODIUM SERPL-SCNC: 132 MMOL/L (ref 135–145)
WBC # BLD AUTO: 4.3 K/UL (ref 4.8–10.8)

## 2022-07-25 PROCEDURE — 700102 HCHG RX REV CODE 250 W/ 637 OVERRIDE(OP): Performed by: HOSPITALIST

## 2022-07-25 PROCEDURE — 770001 HCHG ROOM/CARE - MED/SURG/GYN PRIV*

## 2022-07-25 PROCEDURE — 700102 HCHG RX REV CODE 250 W/ 637 OVERRIDE(OP)

## 2022-07-25 PROCEDURE — 97530 THERAPEUTIC ACTIVITIES: CPT

## 2022-07-25 PROCEDURE — 700102 HCHG RX REV CODE 250 W/ 637 OVERRIDE(OP): Performed by: STUDENT IN AN ORGANIZED HEALTH CARE EDUCATION/TRAINING PROGRAM

## 2022-07-25 PROCEDURE — A9270 NON-COVERED ITEM OR SERVICE: HCPCS | Performed by: STUDENT IN AN ORGANIZED HEALTH CARE EDUCATION/TRAINING PROGRAM

## 2022-07-25 PROCEDURE — 700111 HCHG RX REV CODE 636 W/ 250 OVERRIDE (IP): Performed by: STUDENT IN AN ORGANIZED HEALTH CARE EDUCATION/TRAINING PROGRAM

## 2022-07-25 PROCEDURE — 85027 COMPLETE CBC AUTOMATED: CPT

## 2022-07-25 PROCEDURE — 99233 SBSQ HOSP IP/OBS HIGH 50: CPT | Performed by: STUDENT IN AN ORGANIZED HEALTH CARE EDUCATION/TRAINING PROGRAM

## 2022-07-25 PROCEDURE — 700111 HCHG RX REV CODE 636 W/ 250 OVERRIDE (IP): Performed by: INTERNAL MEDICINE

## 2022-07-25 PROCEDURE — 700101 HCHG RX REV CODE 250

## 2022-07-25 PROCEDURE — A9270 NON-COVERED ITEM OR SERVICE: HCPCS

## 2022-07-25 PROCEDURE — A9270 NON-COVERED ITEM OR SERVICE: HCPCS | Performed by: HOSPITALIST

## 2022-07-25 PROCEDURE — 36415 COLL VENOUS BLD VENIPUNCTURE: CPT

## 2022-07-25 PROCEDURE — 80048 BASIC METABOLIC PNL TOTAL CA: CPT

## 2022-07-25 PROCEDURE — 82962 GLUCOSE BLOOD TEST: CPT | Mod: 91

## 2022-07-25 PROCEDURE — 82728 ASSAY OF FERRITIN: CPT

## 2022-07-25 PROCEDURE — C9113 INJ PANTOPRAZOLE SODIUM, VIA: HCPCS | Performed by: STUDENT IN AN ORGANIZED HEALTH CARE EDUCATION/TRAINING PROGRAM

## 2022-07-25 RX ORDER — PANTOPRAZOLE SODIUM 40 MG/10ML
40 INJECTION, POWDER, LYOPHILIZED, FOR SOLUTION INTRAVENOUS 2 TIMES DAILY
Status: DISCONTINUED | OUTPATIENT
Start: 2022-07-25 | End: 2022-07-27

## 2022-07-25 RX ORDER — OXYCODONE HYDROCHLORIDE 10 MG/1
10 TABLET ORAL EVERY 6 HOURS PRN
Status: DISCONTINUED | OUTPATIENT
Start: 2022-07-25 | End: 2022-08-04 | Stop reason: HOSPADM

## 2022-07-25 RX ORDER — FERROUS SULFATE 325(65) MG
325 TABLET ORAL
Status: DISCONTINUED | OUTPATIENT
Start: 2022-07-26 | End: 2022-08-04 | Stop reason: HOSPADM

## 2022-07-25 RX ADMIN — OXYCODONE 5 MG: 5 TABLET ORAL at 08:47

## 2022-07-25 RX ADMIN — OMEPRAZOLE 20 MG: 20 CAPSULE, DELAYED RELEASE ORAL at 05:42

## 2022-07-25 RX ADMIN — LOSARTAN POTASSIUM 25 MG: 50 TABLET, FILM COATED ORAL at 05:42

## 2022-07-25 RX ADMIN — OXYCODONE HYDROCHLORIDE 10 MG: 10 TABLET ORAL at 22:26

## 2022-07-25 RX ADMIN — LIDOCAINE 1 PATCH: 700 PATCH TOPICAL at 15:22

## 2022-07-25 RX ADMIN — CALCIUM CARBONATE 500 MG: 500 TABLET, CHEWABLE ORAL at 19:55

## 2022-07-25 RX ADMIN — ACETAMINOPHEN 650 MG: 325 TABLET, FILM COATED ORAL at 19:55

## 2022-07-25 RX ADMIN — DRONEDARONE 400 MG: 400 TABLET, FILM COATED ORAL at 08:48

## 2022-07-25 RX ADMIN — CEFTRIAXONE SODIUM 2 G: 10 INJECTION, POWDER, FOR SOLUTION INTRAVENOUS at 15:23

## 2022-07-25 RX ADMIN — DRONEDARONE 400 MG: 400 TABLET, FILM COATED ORAL at 17:27

## 2022-07-25 RX ADMIN — OXYCODONE HYDROCHLORIDE 10 MG: 10 TABLET ORAL at 15:22

## 2022-07-25 RX ADMIN — ACETAMINOPHEN 650 MG: 325 TABLET, FILM COATED ORAL at 01:41

## 2022-07-25 RX ADMIN — CALCIUM CARBONATE 500 MG: 500 TABLET, CHEWABLE ORAL at 15:22

## 2022-07-25 RX ADMIN — CALCIUM CARBONATE 500 MG: 500 TABLET, CHEWABLE ORAL at 01:41

## 2022-07-25 RX ADMIN — ATORVASTATIN CALCIUM 20 MG: 20 TABLET, FILM COATED ORAL at 19:55

## 2022-07-25 RX ADMIN — PANTOPRAZOLE SODIUM 40 MG: 40 INJECTION, POWDER, FOR SOLUTION INTRAVENOUS at 17:27

## 2022-07-25 RX ADMIN — OXYCODONE 5 MG: 5 TABLET ORAL at 03:11

## 2022-07-25 RX ADMIN — LACTULOSE 15 ML: 20 SOLUTION ORAL at 05:43

## 2022-07-25 RX ADMIN — INSULIN HUMAN 3 UNITS: 100 INJECTION, SOLUTION PARENTERAL at 20:08

## 2022-07-25 RX ADMIN — PANTOPRAZOLE SODIUM 40 MG: 40 INJECTION, POWDER, FOR SOLUTION INTRAVENOUS at 10:39

## 2022-07-25 ASSESSMENT — COGNITIVE AND FUNCTIONAL STATUS - GENERAL
MOVING TO AND FROM BED TO CHAIR: UNABLE
CLIMB 3 TO 5 STEPS WITH RAILING: TOTAL
MOBILITY SCORE: 7
MOVING FROM LYING ON BACK TO SITTING ON SIDE OF FLAT BED: UNABLE
STANDING UP FROM CHAIR USING ARMS: A LOT
WALKING IN HOSPITAL ROOM: TOTAL
TURNING FROM BACK TO SIDE WHILE IN FLAT BAD: UNABLE
SUGGESTED CMS G CODE MODIFIER MOBILITY: CM

## 2022-07-25 ASSESSMENT — ENCOUNTER SYMPTOMS
BACK PAIN: 1
LOSS OF CONSCIOUSNESS: 0
ABDOMINAL PAIN: 1
FEVER: 0
CHILLS: 0
HEADACHES: 0
VOMITING: 0
BLURRED VISION: 0
DIARRHEA: 0
SORE THROAT: 0
NAUSEA: 0
SHORTNESS OF BREATH: 0
MYALGIAS: 1
PALPITATIONS: 0
COUGH: 0
DIZZINESS: 0
DOUBLE VISION: 0

## 2022-07-25 ASSESSMENT — PAIN DESCRIPTION - PAIN TYPE
TYPE: ACUTE PAIN

## 2022-07-25 ASSESSMENT — GAIT ASSESSMENTS: GAIT LEVEL OF ASSIST: UNABLE TO PARTICIPATE

## 2022-07-25 NOTE — PROGRESS NOTES
"   Orthopaedic PA Progress Note    Interval changes:  S/P  1. Rev. TKA with spacer 7/19 Dr. Luz.   2. Left shoulder arthrotomy and lavage for infection with partial synovectomy, and   3. Left shoulder incision and drainage of the subdeltoid abscess Dr. Gray on 7/17.     Shoulder HV 45 mL last 24h, left in.    ROS - Patient denies any new issues. No chest pain, dyspnea, or fever.  Pain well controlled.    /76   Pulse 78   Temp 37.4 °C (99.3 °F) (Temporal)   Resp 17   Ht 1.715 m (5' 7.5\")   Wt 95.1 kg (209 lb 10.5 oz)   SpO2 98%     Patient seen and examined  No acute distress  Breathing non labored  RRR  Surgical dressing is clean, dry, and intact. Patient clearly fires forearm flexors, forearm extensors, and moves all five fingers without issue . Sensation is intact to light touch throughout median, ulnar, and radial nerve distributions. Strong and palpable radial pulses with capillary refill less than 2 seconds. No arm or hand discomfort.  Surgical dressing is clean, dry, and intact. Patient clearly fires tibialis anterior, EHL, and gastrocnemius/soleus. Sensation is intact to light touch throughout superficial peroneal, deep peroneal, tibial, saphenous, and sural nerve distributions. Strong and palpable 2+ dorsalis pedis and posterior tibial pulses with capillary refill less than 2 seconds. No lower leg tenderness or discomfort.    HV: Shoulder 17 mL / 24; knee 0    Recent Labs     07/23/22  0230 07/25/22  0315   WBC 8.0 4.3*   RBC 2.85* 2.55*   HEMOGLOBIN 8.1* 7.2*   HEMATOCRIT 24.6* 21.9*   MCV 86.3 85.9   MCH 28.4 28.2   MCHC 32.9* 32.9*   RDW 46.2 47.6   PLATELETCT 212 125*   MPV 9.0 9.4       Active Hospital Problems    Diagnosis    • Hypertension [I10]      Priority: Medium   • Controlled type 2 diabetes mellitus with hyperglycemia, without long-term current use of insulin (HCC) [E11.65]      Priority: Medium   • Cirrhosis (HCC) [K74.60]      Priority: Low   • Septic arthritis of knee, left " ([facility]) [M00.9]    • Septic arthritis of shoulder, left ([facility]) [M00.9]    • Iron deficiency anemia [D50.9]    • Acute cystitis without hematuria [N30.00]    • Bacteremia [R78.81]    • Hyponatremia [E87.1]    • ELIZABETH (acute kidney injury) ([facility]) [N17.9]    • Hyperlipidemia [E78.5]    • Metabolic encephalopathy [G93.41]    • Atrial fibrillation ([facility]) [I48.91]        Assessment/Plan:  POD# 5/7  Wt bearing status - WBAT UE, TTWB LLE  PT/OT-initiated  Wound care:dressings left in place  Drains - Knee - removed, shoulder - left in place  Amaro-no  Sutures/Staples out- 14 days post operatively  Antibiotics: per ID  DVT Prophylaxis- TEDS/SCDs/Foot pumps.   Lovenox: Start 40 mg daily, Duration-until ambulatory > 150'  Future Procedures - pending resolution of infection + 2 wks minimum  Case Coordination for Discharge Planning - Disposition per Med/ ID  Clear for disposition (home/SNF/IRF) from Orthopaedic team standpoint.  Follow-up Dr. Luz or Dr. Gray 2 wks postop

## 2022-07-25 NOTE — PROGRESS NOTES
"   Orthopaedic PA Progress Note    Interval changes:  S/P  1. Rev. TKA with spacer 7/19 Dr. Luz.   2. Left shoulder arthrotomy and lavage for infection with partial synovectomy, and 3.  Left shoulder incision and drainage of the subdeltoid abscess Dr. Gray on 7/17.     Shoulder HV 50 mL last 24h, left in.    ROS - Patient denies any new issues. No chest pain, dyspnea, or fever.  Pain well controlled.    BP (!) 99/59   Pulse 72   Temp 36.9 °C (98.5 °F) (Temporal)   Resp 18   Ht 1.715 m (5' 7.5\")   Wt 95.1 kg (209 lb 10.5 oz)   SpO2 97%     Patient seen and examined  No acute distress  Breathing non labored  RRR  Surgical dressing is clean, dry, and intact. Patient clearly fires forearm flexors, forearm extensors, and moves all five fingers without issue . Sensation is intact to light touch throughout median, ulnar, and radial nerve distributions. Strong and palpable radial pulses with capillary refill less than 2 seconds. No arm or hand discomfort.  Surgical dressing is clean, dry, and intact. Patient clearly fires tibialis anterior, EHL, and gastrocnemius/soleus. Sensation is intact to light touch throughout superficial peroneal, deep peroneal, tibial, saphenous, and sural nerve distributions. Strong and palpable 2+ dorsalis pedis and posterior tibial pulses with capillary refill less than 2 seconds. No lower leg tenderness or discomfort.    HV: Shoulder 17 mL / 24; knee 0    Recent Labs     07/23/22  0230   WBC 8.0   RBC 2.85*   HEMOGLOBIN 8.1*   HEMATOCRIT 24.6*   MCV 86.3   MCH 28.4   MCHC 32.9*   RDW 46.2   PLATELETCT 212   MPV 9.0       Active Hospital Problems    Diagnosis    • Hypertension [I10]      Priority: Medium   • Controlled type 2 diabetes mellitus with hyperglycemia, without long-term current use of insulin (HCC) [E11.65]      Priority: Medium   • Cirrhosis (HCC) [K74.60]      Priority: Low   • Septic arthritis of knee, left (HCC) [M00.9]    • Septic arthritis of shoulder, left (HCC) " [M00.9]    • Iron deficiency anemia [D50.9]    • Acute cystitis without hematuria [N30.00]    • Bacteremia [R78.81]    • Hyponatremia [E87.1]    • ELIZABETH (acute kidney injury) (HCC) [N17.9]    • Hyperlipidemia [E78.5]    • Metabolic encephalopathy [G93.41]    • Atrial fibrillation (HCC) [I48.91]        Assessment/Plan:  POD# 4/6  Wt bearing status - WBAT UE, TTWB LLE  PT/OT-initiated  Wound care:dressings left in place  Drains - Knee - removed, shoulder - left in place  Amaro-no  Sutures/Staples out- 14 days post operatively  Antibiotics: per ID  DVT Prophylaxis- TEDS/SCDs/Foot pumps.   Lovenox: Start 40 mg daily, Duration-until ambulatory > 150'  Future Procedures - pending resolution of infection + 2 wks minimum  Case Coordination for Discharge Planning - Disposition per Med/ ID  Clear for disposition (home/SNF/IRF) from Orthopaedic team standpoint.  Follow-up Dr. Luz or Dr. Gray 2 wks postop

## 2022-07-25 NOTE — PROGRESS NOTES
Hospital Medicine Daily Progress Note    Date of Service  7/25/2022    Chief Complaint  April Alicia is a 62 y.o. female admitted 7/10/2022 with hypotension, AMS    Hospital Course  63yo PMHx coccaine and ETOH abuse though sober x 8 years per her report, cirrhosis, asthma, AFib on apixaban, DM, HTN. Took an unknown narcotic.  In ED responded to narcan, labs showing Na 121, Cl 89, Creat 5.35, CXR showing cardiomegally.  Admitted to the floor with AMS, HypoNa, dehydration, sepsis from urinary source.  Subsequent blood cultures + Strep Species    Bacteremia with group B streptococcal bacteremia. ID consulted. -on Rocephin, repeated blood culture NTD. TTE showed  EF of 60%, negative for vegetation.     ELIZABETH-resolved    Hx of L knee arthroplasty, left knee pain and swelling. Xray notes New lucency adjacent to the tibial component of arthroplasty suspicious for loosening or infection. Orthopedics consulted, s/p L knee arthrocentesis 7/16 c/w infection. s/p L knee revision, total arthroplasty with antibiotic spacer and wound vac 7/19.     MRI left shoulder showed possible septic arthritis/bursitis. s/p Left shoulder arthrotomy and lavage for infection with partial   Synovectomy. Left shoulder incision and drainage of the subdeltoid abscess 7/17.    ID rec cont ceftriaxone 2gm daily end 8/30/2022 and then follow up in ID clinic at the end of antibiotics course. Need potential aspiration of the knee to ensure no infection prior to replacing the hardware.     Interval Problem Update  No acute overnight events. VS stable. Daily bowel movements. Reports abdominal pain. Hb drop to 7.2 am. On hold eliquis.  Check FOBT, iron profile, on iv PPI (she reports she has remote history of gastric ulcer)  ID rec cont ceftriaxone 8/30/22  Pending placement. Difficult placement due to insurance reason    I have discussed this patient's plan of care and discharge plan at IDT rounds today with Case Management, Nursing, Nursing  leadership, and other members of the IDT team.    Consultants/Specialty  ID  ortho    Code Status  Full Code    Disposition  Patient is not medically cleared for discharge.   Anticipate discharge to to skilled nursing facility.  I have placed the appropriate orders for post-discharge needs.    Review of Systems  Review of Systems   Constitutional: Positive for malaise/fatigue. Negative for chills and fever.   HENT: Negative for nosebleeds and sore throat.    Eyes: Negative for blurred vision and double vision.   Respiratory: Negative for cough and shortness of breath.    Cardiovascular: Negative for chest pain, palpitations and leg swelling.   Gastrointestinal: Positive for abdominal pain. Negative for diarrhea, nausea and vomiting.   Genitourinary: Negative for dysuria.   Musculoskeletal: Positive for back pain, joint pain and myalgias.        L arm pain; left knee pain and swelling    Skin: Negative for rash.   Neurological: Negative for dizziness, loss of consciousness and headaches.        Physical Exam  Temp:  [36.6 °C (97.9 °F)-37.4 °C (99.4 °F)] 37.4 °C (99.3 °F)  Pulse:  [67-78] 78  Resp:  [17-18] 17  BP: ()/(57-76) 129/76  SpO2:  [96 %-99 %] 98 %    Physical Exam  Vitals reviewed.   Constitutional:       General: She is not in acute distress.     Appearance: She is well-developed. She is ill-appearing. She is not diaphoretic.   HENT:      Head: Normocephalic and atraumatic.      Mouth/Throat:      Mouth: Mucous membranes are moist.   Eyes:      Extraocular Movements: Extraocular movements intact.      Conjunctiva/sclera: Conjunctivae normal.   Neck:      Vascular: No JVD.   Cardiovascular:      Rate and Rhythm: Normal rate and regular rhythm.      Pulses: Normal pulses.   Pulmonary:      Effort: Pulmonary effort is normal. No respiratory distress.      Breath sounds: No stridor. No wheezing or rales.   Abdominal:      Palpations: Abdomen is soft.      Tenderness: There is no abdominal tenderness.  There is no guarding or rebound.   Musculoskeletal:         General: Swelling and tenderness present.      Cervical back: Neck supple. No rigidity or tenderness.      Right lower leg: No edema.      Left lower leg: No edema.      Comments: Left knee swelling, tenderness, status post surgery, on dressing, clean  Left shoulder clean dressing, SONY drain in place     Skin:     General: Skin is warm and dry.   Neurological:      Mental Status: She is alert and oriented to person, place, and time. Mental status is at baseline.   Psychiatric:      Comments: Anxious         Fluids    Intake/Output Summary (Last 24 hours) at 7/25/2022 1022  Last data filed at 7/25/2022 0809  Gross per 24 hour   Intake 480 ml   Output 1145 ml   Net -665 ml       Laboratory  Recent Labs     07/23/22  0230 07/25/22  0315   WBC 8.0 4.3*   RBC 2.85* 2.55*   HEMOGLOBIN 8.1* 7.2*   HEMATOCRIT 24.6* 21.9*   MCV 86.3 85.9   MCH 28.4 28.2   MCHC 32.9* 32.9*   RDW 46.2 47.6   PLATELETCT 212 125*   MPV 9.0 9.4     Recent Labs     07/23/22  0230 07/23/22  1130 07/25/22  0315   SODIUM 133*  133* 134* 132*   POTASSIUM 4.4  4.4 4.4 4.3   CHLORIDE 109  109 109 107   CO2 18*  18* 16* 18*   GLUCOSE 120*  120* 143* 109*   BUN 31*  31* 29* 19   CREATININE 0.82  0.82 0.82 0.67   CALCIUM 7.4*  7.4* 7.7* 7.5*                   Imaging  US-EXTREMITY VENOUS UPPER UNILAT LEFT   Final Result      IR-PICC LINE PLACEMENT W/ GUIDANCE > AGE 5   Final Result                  Ultrasound-guided PICC placement performed by qualified nursing staff as    above.          DX-CHEST-FOR LINE PLACEMENT Perform procedure in: Patient's Room   Final Result      1.  Peripherally inserted catheter has been placed and the tip projects appropriately over the superior vena cava.   2.  Stable enlargement of the cardiomediastinal silhouette.   3.  Stable patchy bilateral interstitial opacities.      DX-KNEE 2- LEFT   Final Result      Status post removal of the tibial component of the  left knee arthroplasty      MR-SHOULDER-W/O LEFT   Final Result      1.  Large joint effusion with a large amount of fluid seen extending into the subacromial/subdeltoid bursa with synovitis and small low signal foci within the bursa consistent with pockets of gas. These findings are very suspicious for septic arthritis    and septic bursitis.      2.  Complete full-thickness tear of the supraspinatus tendon with retraction to the bony glenoid.      3.  Full-thickness tear portion of the subscapularis tendon.      4.  Partial-thickness tear of the articular surface fibers of the infraspinatus tendon.      5.  Prominent muscle edema and atrophy involving the rotator cuff as well as all the muscles surrounding the shoulder which may represent myositis.      6.  Osteoarthritis of the glenohumeral joint.      7.  Diffuse tearing of the glenoid labrum.      8.  Subluxation of the long head of the biceps tendon medially from the bicipital groove with marked thinning of the intra-articular portion consistent with tendinosis.      9.  Extensive soft tissue edema to include fat and muscle involving all of the imaged structures around the shoulder likely representing cellulitis.      10.  Widening of the acromioclavicular joint with fragmentation of the distal clavicle consistent with old posttraumatic change versus surgical change.      SH-XIPFSPY-3 VIEW   Final Result      Nonobstructive bowel gas pattern.      EC-ECHOCARDIOGRAM COMPLETE W/O CONT   Final Result      DX-CHEST-PORTABLE (1 VIEW)   Final Result      1.  Interstitial infiltrates versus edema.   2.  Cardiomegaly.      DX-KNEE 2- RIGHT   Final Result      1.  Negative for fracture or malalignment      2.  Right knee arthroplasty appears within normal limits      DX-KNEE 2- LEFT   Final Result      1.  New lucency adjacent to the tibial component of arthroplasty suspicious for loosening or infection      2.  No other finding      US-EXTREMITY VENOUS UPPER UNILAT  LEFT   Final Result      US-RENAL   Final Result      1.  Unremarkable kidneys.   2.  Limited evaluation of the bladder.      CT-ABDOMEN-PELVIS W/O   Final Result         Limited exam due to lack of IV contrast.         1. Mild diffuse wall thickening of the urinary bladder could relate to infection or inflammation. Correlate with UA. No hydronephrosis.   2. Nonspecific mildly prominent fluid-filled proximal small bowel in the left abdomen. This could be seen with enteritis, focal ileus or early obstruction.   3. Cirrhotic liver with portal hypertension and splenomegaly. Wall thickening of the ascending colon could relate to portal hypertension.   4. No ascites.      DX-SHOULDER 2+ LEFT   Final Result      1.  No evidence of acute fracture or dislocation.      2.  Old posttraumatic changes of the distal clavicle.      DX-CHEST-PORTABLE (1 VIEW)   Final Result      Cardiomegaly.           Assessment/Plan  * Bacteremia  Assessment & Plan  2 bottles with group B strep   Patient reported history of severe bacteremia a year ago after knee surgery last year, which required skin grafting of both lower extremity and was hospitalized at [place] when she visited her son there  Repeat blood cultures NTD    Cont Rocephin  Check TTE negative for vegetation  ID rec cont ceftriaxone 2gm daily end 8/30/2022 and then follow up in ID clinic at the end of antibiotics course. Need potential aspiration of the knee to ensure no infection prior to replacing the hardware.       Septic arthritis of knee, left (HCC)  Assessment & Plan  History of left knee replacement 2017, which was complicated with infection 2 weeks later  Patient had recurrent left knee infection last year which was complicated with bacteremia and she was hospitalized at the [place] hospital when she visited her son     Xray notes New lucency adjacent to the tibial component of arthroplasty suspicious for loosening or infection.   Orthopedics consulted, s/p L knee  arthrocentesis 7/16 c/w infection.   s/p L knee resection and antibiotic spacer 7/19  ID rec cont ceftriaxone 2gm daily end 8/30/2022 and then follow up in ID clinic at the end of antibiotics course. Need potential aspiration of the knee to ensure no infection prior to replacing the hardware.     Septic arthritis of shoulder, left (HCC)  Assessment & Plan  MRI left shoulder showed septic arthritis/subdeltoid abscess    Ortho consulted,  s/p Left shoulder arthrotomy and lavage for infection with partial synovectomy. Left shoulder incision and drainage of the subdeltoid abscess 7/17.  SONY drain in place  fluid culture NTD  On Rocephin.     Iron deficiency anemia  Assessment & Plan  Acute on chronic  Iron study consistent with iron deficiency  Start po iron supplements  Check FOBT given remote hx of gastric ulcer and abdominal pain. On hold Eliquis. On PPI iv    Hyponatremia  Assessment & Plan  123 on admission  High SG on UA: probable hypovolemic and alcohol use  Cont to monitor    Resolved    Acute cystitis without hematuria  Assessment & Plan  On Rocephin for bacteremia    Metabolic encephalopathy  Assessment & Plan  Likely multifactorial in conjunction with narcotic use  S/p Narcan treatment, improved  Start lacutlose  Cont to treat infection     resolved      Hyperlipidemia- (present on admission)  Assessment & Plan  Continue Lipitor    ELIZABETH (acute kidney injury) (HCC)- (present on admission)  Assessment & Plan  Likely secondary dehydration  4.7 on admission   Good UOP  DC IVFs  Cont to monitor  Renally dose medications as appropriate    Resolved    Atrial fibrillation (HCC)- (present on admission)  Assessment & Plan  On apixaban and dronedarone as outpt  Eliquis resumed post surgery ( on hold due to dropping Hb)  Rate controlled     Cirrhosis (HCC)- (present on admission)  Assessment & Plan  Secondary to ETOH  Lactulose  monitor    Hypertension- (present on admission)  Assessment & Plan  Resume the home  losartan    Controlled type 2 diabetes mellitus with hyperglycemia, without long-term current use of insulin (HCC)- (present on admission)  Assessment & Plan  Sliding-scale insulin, diabetic diet  A1c 5.7%         VTE prophylaxis: SCDs/TEDs and therapeutic anticoagulation with Eliquis (on hold)    I have performed a physical exam and reviewed and updated ROS and Plan today (7/25/2022). In review of yesterday's note (7/24/2022), there are no changes except as documented above.

## 2022-07-25 NOTE — PROGRESS NOTES
0803: Patient A&Ox4, [place], sitting upright in bed, endorses pain to L knee, wound dressing(s) assessed, L shoulder clean/dry/intact, SONY drained 25mL--see flowsheet, Preveena wound dressing in place, Clean/dry/intact. No abnormalities to note. PICC MARRY assessed, flushes without difficulty, good blood return. Patient repositioned for comfort at this time.     0847: Pain medication administered as per order--see MAR.    1035: Pain reassessed, pt states pain is 7-8/10 on numeric scale. Discussed with provider, orders to follow.    COLIN Sravani [address] 1960, 61 y o  female MRN: [id number]  Unit/Bed#: ICU 07 Encounter: [id number]  Primary Care Provider: Corbin Whitley DO   Date and time admitted to hospital: 3/27/2021  4:49 AM    * Acute respiratory failure with hypoxia [facility])  Assessment & Plan  · Per her PCP has not been feeling well for 2 weeks  Activated EMS due to shortness of breath  Found tripoding with O2 saturation in the 70's   Placed on BiPAP and received nebulizers and steroids in the ER for wheezing without improvement  · Intubated at [facility] and transferred to [facility]  · Multifactorial related to sepsis concerning for pneumonia vs atelectasis vs pericardial effusion  · Received Cefepime and Vancomycin in the ER transitioned to Ceftriaxone and Azithromycin  · Continue on mechanical ventilation  · Check ABG  · Maintain O2 saturation >90%  · Bronchodilators      Sepsis (HCC)  Assessment & Plan  · Likely secondary to pneumonia now with thick ET secretions  · Obtain sputum culture and blood cultures  · Check procalcitonin  · Given Cefepime and Vancomycin in the ER --- Transitioned to Ceftriaxone and Azithromycin  · Urine negative     Pericardial effusion  Assessment & Plan  · Troponin 0 08  · BNP 1016  · Check Echo this am  · May need to consult cardiology on echo result    Hypertension  Assessment & Plan  · Found to have calcification on renal arteries on CTA  · Hypertension noted on previous ER visits but not on outpatient medication  · May need to consult nephrology for FABIOLA workup    Hyperglycemia  Assessment & Plan  · May be related to steroids in the ER  · Check hemoglobin A1C  · Place on SSI coverage    Chronic pain  Assessment & Plan  · Given doses of Fentanyl and now on Propofol  · Will hold for now     Elevated troponin  Assessment & Plan  · Likely secondary to sepsis vs pericardial effusion  · Trend troponins  · Bilateral pleural effusions noted on CTA with no previous echocardiogram  · Obtain echo      -------------------------------------------------------------------------------------------------------------  Chief Complaint: Shortness of breath    History of Present Illness   HX and PE limited by:  intubated  Rashawn Montemayor is a 61 y o  female who presents with a past medical history significant for chronic pain, fibromyalgia, and anxiety who presented to the ER in respiratory distress  Per her PCP she hasn't been feeling well for 2 weeks  However, yesterday she was short of breath and activated EMS  They found her tripoding with O2 sat in the 70%  Placed on BiPAP and intubated in the ER at [facility]  Per the ER she was wheezing and received multiple nebulizers and steroids  On CTA was found to have pericardial effusion  She was transferred to [facility] for further management  History obtained from chart review  -------------------------------------------------------------------------------------------------------------  Dispo: Admit to Critical Care     Code Status: Level 1 - Full Code  --------------------------------------------------------------------------------------------------------------  Review of Systems   Unable to perform ROS: Intubated       A 12-point, complete review of systems was reviewed and negative except as stated above     Physical Exam  Vitals signs reviewed  Constitutional:       General: She is not in acute distress  Appearance: Normal appearance  HENT:      Head: Normocephalic and atraumatic  Nose: Nose normal  No congestion  Mouth/Throat:      Mouth: Mucous membranes are moist       Pharynx: Oropharynx is clear  No oropharyngeal exudate  Eyes:      General: No scleral icterus  Conjunctiva/sclera: Conjunctivae normal       Pupils: Pupils are equal, round, and reactive to light  Neck:      Musculoskeletal: Normal range of motion and neck supple  Cardiovascular:      Rate and Rhythm: Tachycardia present        Pulses: Normal pulses  Heart sounds: Normal heart sounds  No murmur  Pulmonary:      Effort: Pulmonary effort is normal       Comments: Coarse base of lungs  Tan thick ET secretions  Abdominal:      General: Abdomen is flat  Bowel sounds are normal  There is no distension  Musculoskeletal: Normal range of motion  General: No swelling  Skin:     General: Skin is warm and dry  Capillary Refill: Capillary refill takes less than 2 seconds  Neurological:      Comments: Able to follow commands       --------------------------------------------------------------------------------------------------------------  Vitals: There were no vitals filed for this visit  Temp  Min: 96 4 °F (35 8 °C)  Max: 97 5 °F (36 4 °C)        There is no height or weight on file to calculate BMI  Laboratory and Diagnostics:  Results from last 7 days   Lab Units 03/27/21  0012   WBC Thousand/uL 21 90*   HEMOGLOBIN g/dL 13 5   HEMATOCRIT % 42 4   PLATELETS Thousands/uL 543*   MONO PCT % 5     Results from last 7 days   Lab Units 03/27/21  0011   SODIUM mmol/L 129*   POTASSIUM mmol/L 3 2*   CHLORIDE mmol/L 93*   CO2 mmol/L 24   ANION GAP mmol/L 12   BUN mg/dL 14   CREATININE mg/dL 1 04   CALCIUM mg/dL 9 6   GLUCOSE RANDOM mg/dL 390*   ALT U/L 9   AST U/L 13   ALK PHOS U/L 92   ALBUMIN g/dL 4 1   TOTAL BILIRUBIN mg/dL 0 50          Results from last 7 days   Lab Units 03/27/21  0011   INR  1 32*   PTT seconds 31      Results from last 7 days   Lab Units 03/27/21  0011   TROPONIN I ng/mL 0 08*     Results from last 7 days   Lab Units 03/27/21  0326 03/27/21  0011   LACTIC ACID mmol/L 1 5 4 9*     ABG:  Results from last 7 days   Lab Units 03/27/21  0526   PH ART  7 313*   PCO2 ART mm Hg 51 6*   PO2 ART mm Hg 82 8   HCO3 ART mmol/L 25 6   BASE EXC ART mmol/L -1 3   ABG SOURCE  Radial, Left     VBG:  Results from last 7 days   Lab Units 03/27/21  0526   ABG SOURCE  Radial, Left           Micro:        EKG: Sinus tachycardia  Imaging:  I have personally reviewed pertinent films in PACS      Historical Information   Past Medical History:   Diagnosis Date    Fatty liver      Past Surgical History:   Procedure Laterality Date     SECTION      CHOLECYSTECTOMY      ROTATOR CUFF REPAIR W/ DISTAL CLAVICLE EXCISION Right     TONSILLECTOMY       Social History   Social History     Substance and Sexual Activity   Alcohol Use No     Social History     Substance and Sexual Activity   Drug Use No     Social History     Tobacco Use   Smoking Status Current Every Day Smoker    Packs/day: 1 00    Types: Cigarettes   Smokeless Tobacco Never Used     Exercise History:    Family History:   No family history on file  I have reviewed this patient's family history and commented on sigificant items within the HPI      Medications:  Current Facility-Administered Medications   Medication Dose Route Frequency    azithromycin (ZITHROMAX) 500 mg in sodium chloride 0 9% 250mL IVPB 500 mg  500 mg Intravenous Q24H    cefTRIAXone (ROCEPHIN) 1,000 mg in dextrose 5 % 50 mL IVPB  1,000 mg Intravenous Q24H    chlorhexidine (PERIDEX) 0 12 % oral rinse 15 mL  15 mL Swish & Spit Q12H [facility]    heparin (porcine) subcutaneous injection 5,000 Units  5,000 Units Subcutaneous Q8H [facility]    insulin lispro (HumaLOG) 100 units/mL subcutaneous injection 1-5 Units  1-5 Units Subcutaneous Q6H [facility]    ipratropium (ATROVENT) 0 02 % inhalation solution 0 5 mg  0 5 mg Nebulization Q6H    levalbuterol (XOPENEX) inhalation solution 1 25 mg  1 25 mg Nebulization Q6H    potassium chloride 20 mEq IVPB (premix)  20 mEq Intravenous Once    potassium chloride oral solution 40 mEq  40 mEq Oral Once    propofol (DIPRIVAN) 1000 mg in 100 mL infusion (premix)  5-50 mcg/kg/min Intravenous Titrated    sodium chloride 0 9 % bolus 448 mL  448 mL Intravenous Once     Home medications:  Prior to Admission Medications   Prescriptions Last Dose Informant Patient Reported? Taking?    clonazePAM (KlonoPIN) 0 5 mg tablet   Yes No   Sig: Take 0 5 mg by mouth daily at bedtime   gabapentin (NEURONTIN) 300 mg capsule   Yes No   Sig: Take 100 mg by mouth as needed   ibuprofen (MOTRIN) 600 mg tablet   Yes No   Sig: Take 600 mg by mouth 2 (two) times a day   morphine (MS CONTIN) 30 mg 12 hr tablet   Yes No   Sig: Take 30 mg by mouth 2 (two) times a day   oxyCODONE-acetaminophen (PERCOCET) 5-325 mg per tablet   Yes No   Sig: Take 1 tablet by mouth every 4 (four) hours as needed for moderate pain   tiZANidine (ZANAFLEX) 4 mg tablet   Yes No   Sig: Take 4 mg by mouth every 8 (eight) hours as needed for muscle spasms      Facility-Administered Medications: None     Allergies: Allergies   Allergen Reactions    Wellbutrin [Bupropion] Arthralgia       ------------------------------------------------------------------------------------------------------------  Advance Directive and Living Will:      Power of :    POLST:    ------------------------------------------------------------------------------------------------------------  Anticipated Length of Stay is > 2 midnights    Care Time Delivered:   No Critical Care time spent       ESTHER Coles        Portions of the record may have been created with voice recognition software  Occasional wrong word or "sound a like" substitutions may have occurred due to the inherent limitations of voice recognition software    Read the chart carefully and recognize, using context, where substitutions have occurred

## 2022-07-25 NOTE — CARE PLAN
The patient is Stable - Low risk of patient condition declining or worsening    Shift Goals  Clinical Goals: pain management  Patient Goals: pain control and rest`  Family Goals: n/a    Progress made toward(s) clinical / shift goals:    Problem: Pain - Standard  Goal: Alleviation of pain or a reduction in pain to the patient’s comfort goal  Outcome: Progressing  Note: PRN pain medications in use for pain control.       Problem: Fall Risk  Goal: Patient will remain free from falls  Outcome: Progressing  Note: Fall risk assessed, fall precautions in place, bed alarm on  pt verbalizes understanding of fall risk.        Patient is not progressing towards the following goals:

## 2022-07-26 PROBLEM — D61.811 DRUG-INDUCED PANCYTOPENIA (HCC): Status: ACTIVE | Noted: 2022-07-26

## 2022-07-26 LAB
ANION GAP SERPL CALC-SCNC: 8 MMOL/L (ref 7–16)
BUN SERPL-MCNC: 17 MG/DL (ref 8–22)
CALCIUM SERPL-MCNC: 7.3 MG/DL (ref 8.5–10.5)
CHLORIDE SERPL-SCNC: 106 MMOL/L (ref 96–112)
CO2 SERPL-SCNC: 19 MMOL/L (ref 20–33)
CREAT SERPL-MCNC: 0.65 MG/DL (ref 0.5–1.4)
ERYTHROCYTE [DISTWIDTH] IN BLOOD BY AUTOMATED COUNT: 48 FL (ref 35.9–50)
GFR SERPLBLD CREATININE-BSD FMLA CKD-EPI: 99 ML/MIN/1.73 M 2
GLUCOSE BLD STRIP.AUTO-MCNC: 118 MG/DL (ref 65–99)
GLUCOSE BLD STRIP.AUTO-MCNC: 140 MG/DL (ref 65–99)
GLUCOSE BLD STRIP.AUTO-MCNC: 156 MG/DL (ref 65–99)
GLUCOSE BLD STRIP.AUTO-MCNC: 97 MG/DL (ref 65–99)
GLUCOSE SERPL-MCNC: 115 MG/DL (ref 65–99)
HCT VFR BLD AUTO: 21.5 % (ref 37–47)
HGB BLD-MCNC: 7 G/DL (ref 12–16)
MCH RBC QN AUTO: 28.3 PG (ref 27–33)
MCHC RBC AUTO-ENTMCNC: 32.6 G/DL (ref 33.6–35)
MCV RBC AUTO: 87 FL (ref 81.4–97.8)
PLATELET # BLD AUTO: 128 K/UL (ref 164–446)
PMV BLD AUTO: 9.8 FL (ref 9–12.9)
POTASSIUM SERPL-SCNC: 3.9 MMOL/L (ref 3.6–5.5)
RBC # BLD AUTO: 2.47 M/UL (ref 4.2–5.4)
SODIUM SERPL-SCNC: 133 MMOL/L (ref 135–145)
WBC # BLD AUTO: 3.9 K/UL (ref 4.8–10.8)

## 2022-07-26 PROCEDURE — C9113 INJ PANTOPRAZOLE SODIUM, VIA: HCPCS | Performed by: STUDENT IN AN ORGANIZED HEALTH CARE EDUCATION/TRAINING PROGRAM

## 2022-07-26 PROCEDURE — 97110 THERAPEUTIC EXERCISES: CPT

## 2022-07-26 PROCEDURE — A9270 NON-COVERED ITEM OR SERVICE: HCPCS | Performed by: STUDENT IN AN ORGANIZED HEALTH CARE EDUCATION/TRAINING PROGRAM

## 2022-07-26 PROCEDURE — 770001 HCHG ROOM/CARE - MED/SURG/GYN PRIV*

## 2022-07-26 PROCEDURE — 80048 BASIC METABOLIC PNL TOTAL CA: CPT

## 2022-07-26 PROCEDURE — 700102 HCHG RX REV CODE 250 W/ 637 OVERRIDE(OP): Performed by: STUDENT IN AN ORGANIZED HEALTH CARE EDUCATION/TRAINING PROGRAM

## 2022-07-26 PROCEDURE — 700111 HCHG RX REV CODE 636 W/ 250 OVERRIDE (IP): Performed by: STUDENT IN AN ORGANIZED HEALTH CARE EDUCATION/TRAINING PROGRAM

## 2022-07-26 PROCEDURE — 700105 HCHG RX REV CODE 258: Performed by: HOSPITALIST

## 2022-07-26 PROCEDURE — A9270 NON-COVERED ITEM OR SERVICE: HCPCS | Performed by: HOSPITALIST

## 2022-07-26 PROCEDURE — 700102 HCHG RX REV CODE 250 W/ 637 OVERRIDE(OP): Performed by: HOSPITALIST

## 2022-07-26 PROCEDURE — A9270 NON-COVERED ITEM OR SERVICE: HCPCS

## 2022-07-26 PROCEDURE — 85027 COMPLETE CBC AUTOMATED: CPT

## 2022-07-26 PROCEDURE — 82962 GLUCOSE BLOOD TEST: CPT | Mod: 91

## 2022-07-26 PROCEDURE — 700101 HCHG RX REV CODE 250

## 2022-07-26 PROCEDURE — 99233 SBSQ HOSP IP/OBS HIGH 50: CPT | Performed by: HOSPITALIST

## 2022-07-26 PROCEDURE — 700111 HCHG RX REV CODE 636 W/ 250 OVERRIDE (IP): Performed by: HOSPITALIST

## 2022-07-26 PROCEDURE — 97535 SELF CARE MNGMENT TRAINING: CPT

## 2022-07-26 PROCEDURE — 700102 HCHG RX REV CODE 250 W/ 637 OVERRIDE(OP)

## 2022-07-26 RX ADMIN — LOSARTAN POTASSIUM 25 MG: 50 TABLET, FILM COATED ORAL at 05:32

## 2022-07-26 RX ADMIN — APIXABAN 5 MG: 5 TABLET, FILM COATED ORAL at 20:26

## 2022-07-26 RX ADMIN — INSULIN HUMAN 3 UNITS: 100 INJECTION, SOLUTION PARENTERAL at 20:26

## 2022-07-26 RX ADMIN — ACETAMINOPHEN 650 MG: 325 TABLET, FILM COATED ORAL at 20:26

## 2022-07-26 RX ADMIN — CALCIUM CARBONATE 500 MG: 500 TABLET, CHEWABLE ORAL at 02:08

## 2022-07-26 RX ADMIN — ACETAMINOPHEN 650 MG: 325 TABLET, FILM COATED ORAL at 02:08

## 2022-07-26 RX ADMIN — OXYCODONE HYDROCHLORIDE 10 MG: 10 TABLET ORAL at 11:47

## 2022-07-26 RX ADMIN — DRONEDARONE 400 MG: 400 TABLET, FILM COATED ORAL at 17:04

## 2022-07-26 RX ADMIN — DRONEDARONE 400 MG: 400 TABLET, FILM COATED ORAL at 05:33

## 2022-07-26 RX ADMIN — PANTOPRAZOLE SODIUM 40 MG: 40 INJECTION, POWDER, FOR SOLUTION INTRAVENOUS at 17:04

## 2022-07-26 RX ADMIN — LACTULOSE 15 ML: 20 SOLUTION ORAL at 05:33

## 2022-07-26 RX ADMIN — LIDOCAINE 1 PATCH: 700 PATCH TOPICAL at 13:56

## 2022-07-26 RX ADMIN — PANTOPRAZOLE SODIUM 40 MG: 40 INJECTION, POWDER, FOR SOLUTION INTRAVENOUS at 05:34

## 2022-07-26 RX ADMIN — DAPTOMYCIN 570 MG: 500 INJECTION, POWDER, LYOPHILIZED, FOR SOLUTION INTRAVENOUS at 13:56

## 2022-07-26 RX ADMIN — OXYCODONE HYDROCHLORIDE 10 MG: 10 TABLET ORAL at 17:04

## 2022-07-26 RX ADMIN — FERROUS SULFATE TAB 325 MG (65 MG ELEMENTAL FE) 325 MG: 325 (65 FE) TAB at 08:11

## 2022-07-26 RX ADMIN — OXYCODONE HYDROCHLORIDE 10 MG: 10 TABLET ORAL at 05:32

## 2022-07-26 RX ADMIN — CALCIUM CARBONATE 500 MG: 500 TABLET, CHEWABLE ORAL at 14:40

## 2022-07-26 RX ADMIN — ATORVASTATIN CALCIUM 20 MG: 20 TABLET, FILM COATED ORAL at 20:26

## 2022-07-26 ASSESSMENT — COGNITIVE AND FUNCTIONAL STATUS - GENERAL
TOILETING: A LOT
PERSONAL GROOMING: A LITTLE
EATING MEALS: A LITTLE
DRESSING REGULAR LOWER BODY CLOTHING: A LOT
DRESSING REGULAR UPPER BODY CLOTHING: A LOT
SUGGESTED CMS G CODE MODIFIER DAILY ACTIVITY: CK
HELP NEEDED FOR BATHING: A LOT
DAILY ACTIVITIY SCORE: 14

## 2022-07-26 ASSESSMENT — ENCOUNTER SYMPTOMS
VOMITING: 0
COUGH: 0
BACK PAIN: 1
DOUBLE VISION: 0
HEADACHES: 0
NAUSEA: 0
DIZZINESS: 0
LOSS OF CONSCIOUSNESS: 0
FEVER: 0
CHILLS: 0
DIARRHEA: 0
MYALGIAS: 1
BLURRED VISION: 0
SHORTNESS OF BREATH: 0
PALPITATIONS: 0
ABDOMINAL PAIN: 0
SORE THROAT: 0

## 2022-07-26 ASSESSMENT — PAIN DESCRIPTION - PAIN TYPE: TYPE: ACUTE PAIN

## 2022-07-26 NOTE — THERAPY
"Physical Therapy   Daily Treatment     Patient Name: April Alicia  Age:  62 y.o., Sex:  female  Medical Record #: [id number]  Today's Date: 7/25/2022     Precautions  Precautions: Fall Risk;Toe Touch Weight Bearing Left Lower Extremity  Comments: hinged knee brace LLE (no orders)    Assessment    Pt was cooperative and eager to progress with therapy. She mobilized as detailed below. Despite verbalizing understanding of L LE TTWB precautions, she required reminders to shift weight off of L LE to R LE and facilitation to maintain TTWB LLE. She stated she sat on EOB with daughter for 3 hours the day prior. Due to her difficulty with standing, pt was open to attempting slideboard transfers for next session. Will continue to follow.       Plan    Continue current treatment plan.    DC Equipment Recommendations: Unable to determine at this time  Discharge Recommendations: Recommend post-acute placement for additional physical therapy services prior to discharge home      Subjective    \"I don't need to answer the phone right now. This is more important.\"      Objective       07/25/22 1440   Cosignature   Documentation Review Approved with modifications made by preceptor in flowsheet   Charge Group   Charges  Yes   PT Therapeutic Activities 3  (45 min)   Precautions   Precautions Fall Risk;Toe Touch Weight Bearing Left Lower Extremity   Comments hinged knee brace LLE (no orders)   Vitals   O2 (LPM) 0   O2 Delivery Device Silicone Nasal Cannula   Pain 0 - 10 Group   Location Arm   Location Orientation Right   Therapist Pain Assessment Post Activity Pain Same as Prior to Activity  (Pt stated pain level in L LE improved with mobility, reported pain in RUE due to \"COVID booster\")   Cognition    Cognition / Consciousness X   Speech/ Communication Hard of Hearing   Level of Consciousness Alert   Safety Awareness Impaired   New Learning Impaired   Attention Impaired   Comments Pt is pleasant, cooperative, and motivated to " progress. Requires encouragement, verbal cues, facilitation to adhere to L LE TTWB precautions   Balance   Sitting Balance (Static) Fair   Sitting Balance (Dynamic) Fair -   Standing Balance (Static) Trace +   Weight Shift Sitting Poor   Weight Shift Standing Absent   Skilled Intervention Compensatory Strategies;Verbal Cuing;Tactile Cuing;Facilitation;Sequencing;Postural Facilitation   Comments no LOB sitting EOB. Attempted standing with FWW and 2 person assist but patient unable to maintain TTWB LLE, unable to weight bear through BUE/FWW, unable to achieve full extension in standing.   Gait Analysis   Gait Level Of Assist Unable to Participate   Weight Bearing Status TTWB LLE   Vision Deficits Impacting Mobility NT   Bed Mobility    Supine to Sit Moderate Assist   Sit to Supine Moderate Assist   Scooting Standby Assist   Skilled Intervention Compensatory Strategies;Sequencing;Tactile Cuing;Verbal Cuing;Facilitation   Comments increased time and effort. used bed features with HOB elevated for sitting upright. needed mod assist with L LE positioning   Functional Mobility   Sit to Stand Moderate Assist  (to max x2 with faciliation of TTWB precautions)   Bed, Chair, Wheelchair Transfer Unable to Participate   Comments Pt performed STS x2 with mod assist x 2, but only able to tolerate for approximately 15 sec each time   How much difficulty does the patient currently have...   Turning over in bed (including adjusting bedclothes, sheets and blankets)? 1   Sitting down on and standing up from a chair with arms (e.g., wheelchair, bedside commode, etc.) 1   Moving from lying on back to sitting on the side of the bed? 1   How much help from another person does the patient currently need...   Moving to and from a bed to a chair (including a wheelchair)? 2   Need to walk in a hospital room? 1   Climbing 3-5 steps with a railing? 1   6 clicks Mobility Score 7   Activity Tolerance   Sitting Edge of Bed 10 mins   Standing 1 min    Comments limited by pain, generalized weakness and L LE TTWB precautions   Patient / Family Goals    Patient / Family Goal #1 walk again   Goal #1 Outcome Goal not met   Short Term Goals    Short Term Goal # 1 Patient will move supine<>sitting EOB without bed features with supervision within 6tx in order to get in/out of bed   Goal Outcome # 1 goal not met   Short Term Goal # 2 Patient will move sitting<>standing with supervision within 6tx in order to initiate transfers   Goal Outcome # 2 Progressing slower than expected   Short Term Goal # 3 Patient will self propel WC 50ft with supervision within 6tx in order to access environment   Goal Outcome # 3 Goal not met   Short Term Goal # 4 Patient will ascend/descend FOS with supervision using depression lift/buttock bump up method within 6tx in order to access apartment   Goal Outcome # 4 Goal not met   Education Group   Education Provided Weight Bearing Status;Weight Bearing Precautions;Role of Physical Therapist   Role of Physical Therapist Patient Response Patient;Acceptance;Explanation;Verbal Demonstration   Weight Bearing Status Patient Response Patient;Acceptance;Explanation;Verbal Demonstration;Action Demonstration   Weight Bearing Precautions Patient Response Patient;Acceptance;Explanation;Verbal Demonstration;Action Demonstration   Anticipated Discharge Equipment and Recommendations   DC Equipment Recommendations Unable to determine at this time   Discharge Recommendations Recommend post-acute placement for additional physical therapy services prior to discharge home   Interdisciplinary Plan of Care Collaboration   IDT Collaboration with  Nursing   Patient Position at End of Therapy In Bed;Bed Alarm On;Call Light within Reach;Tray Table within Reach;Phone within Reach   Collaboration Comments RN aware of visit   Session Information   Date / Session Number  7/25 - 5 (1/3, 7/31)

## 2022-07-26 NOTE — PROGRESS NOTES
Hospital Medicine Daily Progress Note    Date of Service  7/26/2022    Chief Complaint  April Alicia is a 62 y.o. female admitted 7/10/2022 with hypotension, AMS    Hospital Course  63yo PMHx coccaine and ETOH abuse though sober x 8 years per her report, cirrhosis, asthma, AFib on apixaban, DM, HTN. Took an unknown narcotic.  In ED responded to narcan, labs showing Na 121, Cl 89, Creat 5.35, CXR showing cardiomegally.  Admitted to the floor with AMS, HypoNa, dehydration, sepsis from urinary source.  Subsequent blood cultures + Strep Species    Bacteremia with group B streptococcal bacteremia. ID consulted. -on Rocephin, repeated blood culture NTD. TTE showed  EF of 60%, negative for vegetation.     ELIZABETH-resolved    Hx of L knee arthroplasty, left knee pain and swelling. Xray notes New lucency adjacent to the tibial component of arthroplasty suspicious for loosening or infection. Orthopedics consulted, s/p L knee arthrocentesis 7/16 c/w infection. s/p L knee revision, total arthroplasty with antibiotic spacer and wound vac 7/19.     MRI left shoulder showed possible septic arthritis/bursitis. s/p Left shoulder arthrotomy and lavage for infection with partial   Synovectomy. Left shoulder incision and drainage of the subdeltoid abscess 7/17.    ID rec cont ceftriaxone 2gm daily end 8/30/2022 and then follow up in ID clinic at the end of antibiotics course. Need potential aspiration of the knee to ensure no infection prior to replacing the hardware.     Interval Problem Update  7/26: Patient seen sleeping soundly.  Hemovac dc'd.  She did have a temp of 38.1 at 7/25.  She is also noted to have pancytopenia while on Rocephin.  ID contact infectious disease who did agree and switch to daptomycin to continue through 8/30/2022.  Patient is not medically cleared until stabilization of her hemoglobin.  CBC daily and CPK weekly ordered while on daptomycin.    I have discussed this patient's plan of care and discharge  plan at IDT rounds today with Case Management, Nursing, Nursing leadership, and other members of the IDT team.    Consultants/Specialty  ID  ortho    Code Status  Full Code    Disposition  Patient is not medically cleared for discharge.  Needs stabilization of hemoglobin prior to discharge.  Changed Rocephin to daptomycin.  Occult blood stool pending.  Anticipate discharge to to skilled nursing facility.  I have placed the appropriate orders for post-discharge needs.    Review of Systems  Review of Systems   Constitutional: Positive for malaise/fatigue. Negative for chills and fever.   HENT: Negative for nosebleeds and sore throat.    Eyes: Negative for blurred vision and double vision.   Respiratory: Negative for cough and shortness of breath.    Cardiovascular: Negative for chest pain, palpitations and leg swelling.   Gastrointestinal: Negative for abdominal pain, diarrhea, nausea and vomiting.   Genitourinary: Negative for dysuria.   Musculoskeletal: Positive for back pain, joint pain and myalgias.        L arm pain; left knee pain and swelling    Skin: Negative for rash.   Neurological: Negative for dizziness, loss of consciousness and headaches.        Physical Exam  Temp:  [36.8 °C (98.2 °F)-37.7 °C (99.9 °F)] 36.9 °C (98.5 °F)  Pulse:  [67-80] 67  Resp:  [18-20] 20  BP: (103-115)/(58-64) 115/64  SpO2:  [96 %-98 %] 98 %    Physical Exam  Vitals reviewed.   Constitutional:       General: She is not in acute distress.     Appearance: She is well-developed. She is ill-appearing. She is not diaphoretic.   HENT:      Head: Normocephalic and atraumatic.      Mouth/Throat:      Mouth: Mucous membranes are moist.   Eyes:      Extraocular Movements: Extraocular movements intact.      Conjunctiva/sclera: Conjunctivae normal.   Neck:      Vascular: No JVD.   Cardiovascular:      Rate and Rhythm: Normal rate and regular rhythm.      Pulses: Normal pulses.   Pulmonary:      Effort: Pulmonary effort is normal. No  respiratory distress.      Breath sounds: No stridor. No wheezing or rales.   Abdominal:      Palpations: Abdomen is soft.      Tenderness: There is no abdominal tenderness. There is no guarding or rebound.   Musculoskeletal:         General: Swelling and tenderness present.      Cervical back: Neck supple. No rigidity or tenderness.      Right lower leg: No edema.      Left lower leg: No edema.      Comments: Left knee swelling, tenderness, status post surgery, on dressing, clean  Left shoulder clean dressing, SONY drain in place     Skin:     General: Skin is warm and dry.   Neurological:      Mental Status: She is alert and oriented to person, place, and time. Mental status is at baseline.   Psychiatric:         Mood and Affect: Mood normal.         Behavior: Behavior normal.         Thought Content: Thought content normal.         Fluids    Intake/Output Summary (Last 24 hours) at 7/26/2022 1607  Last data filed at 7/26/2022 1446  Gross per 24 hour   Intake 350 ml   Output 950 ml   Net -600 ml       Laboratory  Recent Labs     07/25/22 0315 07/26/22 0315   WBC 4.3* 3.9*   RBC 2.55* 2.47*   HEMOGLOBIN 7.2* 7.0*   HEMATOCRIT 21.9* 21.5*   MCV 85.9 87.0   MCH 28.2 28.3   MCHC 32.9* 32.6*   RDW 47.6 48.0   PLATELETCT 125* 128*   MPV 9.4 9.8     Recent Labs     07/25/22 0315 07/26/22 0315   SODIUM 132* 133*   POTASSIUM 4.3 3.9   CHLORIDE 107 106   CO2 18* 19*   GLUCOSE 109* 115*   BUN 19 17   CREATININE 0.67 0.65   CALCIUM 7.5* 7.3*                   Imaging  US-EXTREMITY VENOUS UPPER UNILAT LEFT   Final Result      IR-PICC LINE PLACEMENT W/ GUIDANCE > AGE 5   Final Result                  Ultrasound-guided PICC placement performed by qualified nursing staff as    above.          DX-CHEST-FOR LINE PLACEMENT Perform procedure in: Patient's Room   Final Result      1.  Peripherally inserted catheter has been placed and the tip projects appropriately over the superior vena cava.   2.  Stable enlargement of the  cardiomediastinal silhouette.   3.  Stable patchy bilateral interstitial opacities.      DX-KNEE 2- LEFT   Final Result      Status post removal of the tibial component of the left knee arthroplasty      MR-SHOULDER-W/O LEFT   Final Result      1.  Large joint effusion with a large amount of fluid seen extending into the subacromial/subdeltoid bursa with synovitis and small low signal foci within the bursa consistent with pockets of gas. These findings are very suspicious for septic arthritis    and septic bursitis.      2.  Complete full-thickness tear of the supraspinatus tendon with retraction to the bony glenoid.      3.  Full-thickness tear portion of the subscapularis tendon.      4.  Partial-thickness tear of the articular surface fibers of the infraspinatus tendon.      5.  Prominent muscle edema and atrophy involving the rotator cuff as well as all the muscles surrounding the shoulder which may represent myositis.      6.  Osteoarthritis of the glenohumeral joint.      7.  Diffuse tearing of the glenoid labrum.      8.  Subluxation of the long head of the biceps tendon medially from the bicipital groove with marked thinning of the intra-articular portion consistent with tendinosis.      9.  Extensive soft tissue edema to include fat and muscle involving all of the imaged structures around the shoulder likely representing cellulitis.      10.  Widening of the acromioclavicular joint with fragmentation of the distal clavicle consistent with old posttraumatic change versus surgical change.      VY-[id number]-3 VIEW   Final Result      Nonobstructive bowel gas pattern.      EC-ECHOCARDIOGRAM COMPLETE W/O CONT   Final Result      DX-CHEST-PORTABLE (1 VIEW)   Final Result      1.  Interstitial infiltrates versus edema.   2.  Cardiomegaly.      DX-KNEE 2- RIGHT   Final Result      1.  Negative for fracture or malalignment      2.  Right knee arthroplasty appears within normal limits      DX-KNEE 2- LEFT   Final  Result      1.  New lucency adjacent to the tibial component of arthroplasty suspicious for loosening or infection      2.  No other finding      US-EXTREMITY VENOUS UPPER UNILAT LEFT   Final Result      US-RENAL   Final Result      1.  Unremarkable kidneys.   2.  Limited evaluation of the bladder.      CT-ABDOMEN-PELVIS W/O   Final Result         Limited exam due to lack of IV contrast.         1. Mild diffuse wall thickening of the urinary bladder could relate to infection or inflammation. Correlate with UA. No hydronephrosis.   2. Nonspecific mildly prominent fluid-filled proximal small bowel in the left abdomen. This could be seen with enteritis, focal ileus or early obstruction.   3. Cirrhotic liver with portal hypertension and splenomegaly. Wall thickening of the ascending colon could relate to portal hypertension.   4. No ascites.      DX-SHOULDER 2+ LEFT   Final Result      1.  No evidence of acute fracture or dislocation.      2.  Old posttraumatic changes of the distal clavicle.      DX-CHEST-PORTABLE (1 VIEW)   Final Result      Cardiomegaly.           Assessment/Plan  * Bacteremia- (present on admission)  Assessment & Plan  2 bottles with group B strep   Patient reported history of severe bacteremia a year ago after knee surgery last year, which required skin grafting of both lower extremity and was hospitalized at [place] when she visited her son there  Repeat blood cultures NTD    Cont Rocephin  Check TTE negative for vegetation  ID rec cont ceftriaxone 2gm daily end 8/30/2022 and then follow up in ID clinic at the end of antibiotics course. Need potential aspiration of the knee to ensure no infection prior to replacing the hardware.       Drug-induced pancytopenia (HCC)  Assessment & Plan  Secondary to Rocephin.  7/26:  Changed rocephin to daptomycin IV.  Check daily CBC.  CPK and CBC weekly while on daptomycin.    Septic arthritis of knee, left (HCC)  Assessment & Plan  History of left knee  replacement 2017, which was complicated with infection 2 weeks later  Patient had recurrent left knee infection last year which was complicated with bacteremia and she was hospitalized at the [place] hospital when she visited her son     Xray notes New lucency adjacent to the tibial component of arthroplasty suspicious for loosening or infection.   Orthopedics consulted, s/p L knee arthrocentesis 7/16 c/w infection.   s/p L knee resection and antibiotic spacer 7/19  ID rec cont ceftriaxone 2gm daily end 8/30/2022 and then follow up in ID clinic at the end of antibiotics course. Need potential aspiration of the knee to ensure no infection prior to replacing the hardware.   TTWB LLE per ortho.    Septic arthritis of shoulder, left (HCC)- (present on admission)  Assessment & Plan  MRI left shoulder showed septic arthritis/subdeltoid abscess    Ortho consulted,  s/p Left shoulder arthrotomy and lavage for infection with partial synovectomy. Left shoulder incision and drainage of the subdeltoid abscess 7/17.  SONY drain in place  fluid culture NTD  On Rocephin.     Iron deficiency anemia  Assessment & Plan  Acute on chronic  Iron study consistent with iron deficiency  Start po iron supplements  Check FOBT given remote hx of gastric ulcer and abdominal pain. On Eliquis. On PPI iv.  Per bedside RN, stool does not appear melanotic  Likely 2/2 to Rocephin given pancytopenia.    Hyponatremia- (present on admission)  Assessment & Plan  123 on admission  High SG on UA: probable hypovolemic and alcohol use  Cont to monitor    Resolved    Acute cystitis without hematuria  Assessment & Plan  On Rocephin/now daptomycin for bacteremia    Metabolic encephalopathy- (present on admission)  Assessment & Plan  Likely multifactorial in conjunction with narcotic use  S/p Narcan treatment, improved  Start lacutlose  Cont to treat infection     resolved      Hyperlipidemia- (present on admission)  Assessment & Plan  Continue Lipitor    ELIZABETH  (acute kidney injury) (HCC)- (present on admission)  Assessment & Plan  Likely secondary dehydration  4.7 on admission   Good UOP  DC IVFs  Cont to monitor  Renally dose medications as appropriate    Resolved    Atrial fibrillation (HCC)- (present on admission)  Assessment & Plan  On apixaban and dronedarone as outpt  Eliquis resumed post surgery ( on hold due to dropping Hb)  Rate controlled     Cirrhosis (HCC)- (present on admission)  Assessment & Plan  Secondary to ETOH  Lactulose  monitor    Hypertension- (present on admission)  Assessment & Plan  Resume the home losartan    Controlled type 2 diabetes mellitus with hyperglycemia, without long-term current use of insulin (HCC)- (present on admission)  Assessment & Plan  Sliding-scale insulin, diabetic diet  A1c 5.7%         VTE prophylaxis: SCDs/TEDs and therapeutic anticoagulation with Eliquis (on hold)    I have performed a physical exam and reviewed and updated ROS and Plan today (7/26/2022). In review of yesterday's note (7/25/2022), there are no changes except as documented above.

## 2022-07-26 NOTE — THERAPY
Occupational Therapy  Daily Treatment     Patient Name: April Alicia  Age:  62 y.o., Sex:  female  Medical Record #: [id number]  Today's Date: 7/26/2022    Precautions: Fall Risk, Toe Touch Weight Bearing Left Lower Extremity  Comments: hinged knee brace at bedside (no orders)    Assessment    Pt agreeable with encouragement. Hinged knee brace, unlocked (with no orders) and immobilizer at bedside. Donned immobilizer (need clarification via ortho). Pt continues to be limited by cog, weakness, UE pain/weakness, poor balance, and poor tolerance. Pt required max A for donning brace and Lb dressing, min A for bed mob, and mod A for attempted STS. Provided shoulder ROM as tolerated. Will continue to follow.    Plan    Continue current treatment plan.    DC Equipment Recommendations: (P) Unable to determine at this time  Discharge Recommendations: (P) Recommend post-acute placement for additional occupational therapy services prior to discharge home       07/26/22 1206   Cognition    Comments Pt agreeable with encouragement   Other Treatments   Other Treatments Provided Education on continued benefits of OOB activity ROM as tolerated for healing, blood flow   Balance   Sitting Balance (Static) Fair   Sitting Balance (Dynamic) Fair -   Standing Balance (Static) Trace +   Standing Balance (Dynamic) Fair -   Weight Shift Sitting Poor   Weight Shift Standing Absent   Skilled Intervention Verbal Cuing;Tactile Cuing;Sequencing;Compensatory Strategies   Comments max cues for hand placement   Bed Mobility    Supine to Sit Moderate Assist   Sit to Supine Contact Guard Assist   Skilled Intervention Verbal Cuing;Tactile Cuing;Sequencing   Activities of Daily Living   Lower Body Dressing Maximal Assist  (socks and brace)   Skilled Intervention Verbal Cuing;Tactile Cuing;Sequencing   How much help from another person does the patient currently need...   6 Clicks Daily Activity Score 14   Functional Mobility   Sit to Stand  Moderate Assist   Bed, Chair, Wheelchair Transfer Unable to Participate   Mobility attempted STS x2, unable to clear buttocks   Patient / Family Goals   Patient / Family Goal #1 to walk again   Short Term Goals   Short Term Goal # 1 Pt will tolerate seated ADLs for 10 min unsupported EOB   Goal Outcome # 1 Progressing as expected   Short Term Goal # 2 Pt will demo functional txfs min A   Goal Outcome # 2 Goal not met   Short Term Goal # 3 Pt will participate in HEP to increased BUE strength   Goal Outcome # 3 Progressing slower than expected   Education Group   Education Provided Upper Extremity Range of Motion   Role of Occupational Therapist Patient Response Patient;Acceptance;Reinforcement Needed   Upper Ext ROM Patient Response Patient;Acceptance;Explanation   Anticipated Discharge Equipment and Recommendations   DC Equipment Recommendations Unable to determine at this time   Discharge Recommendations Recommend post-acute placement for additional occupational therapy services prior to discharge home

## 2022-07-26 NOTE — PROGRESS NOTES
Brief ID note:    Contacted by hospitalist regarding patient's pancytopenia while on ceftriaxone.  She is being treated for left total knee prosthetic joint infection and group B strep bacteremia.    -Discontinue IV ceftriaxone given worsening pancytopenia  - Start IV daptomycin 6 mg/kg daily through original stop date of 8/30/2022  -Monitor CBC  -Monitor CPK weekly while on IV daptomycin    Please contact ID if patient will go home versus [facility] for her antibiotics as new antibiotic orders will need to be written    Discussed with hospitalist, Dr. Rosado

## 2022-07-26 NOTE — PROGRESS NOTES
"Patient resting in bed at this time, A&Ox4, RR WDL on RA, no acute distress, dressing to L shoulder observed to be clean/dry/intact, dressing to L leg clean/dry/intact, immobilizer in place, pt verbalizes pain is \"improved\", rating 8/10 on numeric scale. Safety instructions given, pt verbalizes understanding. Safety precautions maintained. Call light and personal items within reach.   "

## 2022-07-26 NOTE — ASSESSMENT & PLAN NOTE
Secondary to Rocephin.  7/26:  Changed rocephin to daptomycin IV.  Check daily CBC.  CPK and CBC weekly while on daptomycin.

## 2022-07-26 NOTE — PROGRESS NOTES
"   Orthopaedic PA Progress Note    Interval changes:  S/P  1. Rev. TKA with spacer 7/19 Dr. Luz.   2. Left shoulder arthrotomy and lavage for infection with partial synovectomy, and   3. Left shoulder incision and drainage of the subdeltoid abscess Dr. Gray on 7/17.     Shoulder HV fell out last night. Dressing CDI. OK to commence/continue gentle shoulder AROM exercises AT.    ROS - Patient denies any new issues. No chest pain, dyspnea, or fever.  Pain well controlled.    /64   Pulse 67   Temp 36.9 °C (98.5 °F) (Temporal)   Resp 20   Ht 1.715 m (5' 7.5\")   Wt 95.1 kg (209 lb 10.5 oz)   SpO2 98%     Patient seen and examined  No acute distress  Breathing non labored  RRR  Surgical dressing is clean, dry, and intact. Patient clearly fires forearm flexors, forearm extensors, and moves all five fingers without issue . Sensation is intact to light touch throughout median, ulnar, and radial nerve distributions. Strong and palpable radial pulses with capillary refill less than 2 seconds. No arm or hand discomfort.  Surgical dressing is clean, dry, and intact. Patient clearly fires tibialis anterior, EHL, and gastrocnemius/soleus. Sensation is intact to light touch throughout superficial peroneal, deep peroneal, tibial, saphenous, and sural nerve distributions. Strong and palpable 2+ dorsalis pedis and posterior tibial pulses with capillary refill less than 2 seconds. No lower leg tenderness or discomfort.    Recent Labs     07/25/22  0315 07/26/22  0315   WBC 4.3* 3.9*   RBC 2.55* 2.47*   HEMOGLOBIN 7.2* 7.0*   HEMATOCRIT 21.9* 21.5*   MCV 85.9 87.0   MCH 28.2 28.3   MCHC 32.9* 32.6*   RDW 47.6 48.0   PLATELETCT 125* 128*   MPV 9.4 9.8       Active Hospital Problems    Diagnosis    • Hypertension [I10]      Priority: Medium   • Controlled type 2 diabetes mellitus with hyperglycemia, without long-term current use of insulin (HCC) [E11.65]      Priority: Medium   • Cirrhosis (HCC) [K74.60]      Priority: Low "   • Septic arthritis of knee, left (HCC) [M00.9]    • Septic arthritis of shoulder, left (HCC) [M00.9]    • Iron deficiency anemia [D50.9]    • Acute cystitis without hematuria [N30.00]    • Bacteremia [R78.81]    • Hyponatremia [E87.1]    • ELIZABETH (acute kidney injury) (HCC) [N17.9]    • Hyperlipidemia [E78.5]    • Metabolic encephalopathy [G93.41]    • Atrial fibrillation ([facility]) [I48.91]        Assessment/Plan:  POD# 6/8  Wt bearing status - WBAT UE, TTWB LLE  PT/OT-initiated  Wound care:dressings left in place  Drains - Knee - removed, shoulder - left in place  Amaro-no  Sutures/Staples out- 14 days post operatively  Antibiotics: per ID  DVT Prophylaxis- TEDS/SCDs/Foot pumps.   Lovenox: Start 40 mg daily, Duration-until ambulatory > 150'  Future Procedures - pending resolution of infection + 2 wks minimum  Case Coordination for Discharge Planning - Disposition per Med/ ID  Clear for disposition (home/SNF/IRF) from Orthopaedic team standpoint.  Follow-up Dr. Luz or Dr. Gray 2 wks postop

## 2022-07-27 LAB
BASOPHILS # BLD AUTO: 0.7 % (ref 0–1.8)
BASOPHILS # BLD: 0.03 K/UL (ref 0–0.12)
CK SERPL-CCNC: 26 U/L (ref 0–154)
EOSINOPHIL # BLD AUTO: 0.12 K/UL (ref 0–0.51)
EOSINOPHIL NFR BLD: 2.8 % (ref 0–6.9)
ERYTHROCYTE [DISTWIDTH] IN BLOOD BY AUTOMATED COUNT: 46.7 FL (ref 35.9–50)
GLUCOSE BLD STRIP.AUTO-MCNC: 122 MG/DL (ref 65–99)
GLUCOSE BLD STRIP.AUTO-MCNC: 131 MG/DL (ref 65–99)
GLUCOSE BLD STRIP.AUTO-MCNC: 172 MG/DL (ref 65–99)
GLUCOSE BLD STRIP.AUTO-MCNC: 97 MG/DL (ref 65–99)
HCT VFR BLD AUTO: 22.3 % (ref 37–47)
HGB BLD-MCNC: 7.3 G/DL (ref 12–16)
IMM GRANULOCYTES # BLD AUTO: 0.02 K/UL (ref 0–0.11)
IMM GRANULOCYTES NFR BLD AUTO: 0.5 % (ref 0–0.9)
LYMPHOCYTES # BLD AUTO: 1.02 K/UL (ref 1–4.8)
LYMPHOCYTES NFR BLD: 23.7 % (ref 22–41)
MCH RBC QN AUTO: 28 PG (ref 27–33)
MCHC RBC AUTO-ENTMCNC: 32.7 G/DL (ref 33.6–35)
MCV RBC AUTO: 85.4 FL (ref 81.4–97.8)
MONOCYTES # BLD AUTO: 0.55 K/UL (ref 0–0.85)
MONOCYTES NFR BLD AUTO: 12.8 % (ref 0–13.4)
NEUTROPHILS # BLD AUTO: 2.56 K/UL (ref 2–7.15)
NEUTROPHILS NFR BLD: 59.5 % (ref 44–72)
NRBC # BLD AUTO: 0 K/UL
NRBC BLD-RTO: 0 /100 WBC
PLATELET # BLD AUTO: 122 K/UL (ref 164–446)
PMV BLD AUTO: 9.3 FL (ref 9–12.9)
RBC # BLD AUTO: 2.61 M/UL (ref 4.2–5.4)
WBC # BLD AUTO: 4.3 K/UL (ref 4.8–10.8)

## 2022-07-27 PROCEDURE — A9270 NON-COVERED ITEM OR SERVICE: HCPCS | Performed by: STUDENT IN AN ORGANIZED HEALTH CARE EDUCATION/TRAINING PROGRAM

## 2022-07-27 PROCEDURE — 82550 ASSAY OF CK (CPK): CPT

## 2022-07-27 PROCEDURE — A9270 NON-COVERED ITEM OR SERVICE: HCPCS | Performed by: HOSPITALIST

## 2022-07-27 PROCEDURE — 770001 HCHG ROOM/CARE - MED/SURG/GYN PRIV*

## 2022-07-27 PROCEDURE — 700102 HCHG RX REV CODE 250 W/ 637 OVERRIDE(OP)

## 2022-07-27 PROCEDURE — 85025 COMPLETE CBC W/AUTO DIFF WBC: CPT

## 2022-07-27 PROCEDURE — 700105 HCHG RX REV CODE 258: Performed by: HOSPITALIST

## 2022-07-27 PROCEDURE — 82962 GLUCOSE BLOOD TEST: CPT

## 2022-07-27 PROCEDURE — 700102 HCHG RX REV CODE 250 W/ 637 OVERRIDE(OP): Performed by: STUDENT IN AN ORGANIZED HEALTH CARE EDUCATION/TRAINING PROGRAM

## 2022-07-27 PROCEDURE — 700101 HCHG RX REV CODE 250

## 2022-07-27 PROCEDURE — 700111 HCHG RX REV CODE 636 W/ 250 OVERRIDE (IP): Performed by: STUDENT IN AN ORGANIZED HEALTH CARE EDUCATION/TRAINING PROGRAM

## 2022-07-27 PROCEDURE — 99232 SBSQ HOSP IP/OBS MODERATE 35: CPT | Performed by: HOSPITALIST

## 2022-07-27 PROCEDURE — 700111 HCHG RX REV CODE 636 W/ 250 OVERRIDE (IP): Performed by: HOSPITALIST

## 2022-07-27 PROCEDURE — 700102 HCHG RX REV CODE 250 W/ 637 OVERRIDE(OP): Performed by: HOSPITALIST

## 2022-07-27 PROCEDURE — A9270 NON-COVERED ITEM OR SERVICE: HCPCS

## 2022-07-27 PROCEDURE — C9113 INJ PANTOPRAZOLE SODIUM, VIA: HCPCS | Performed by: STUDENT IN AN ORGANIZED HEALTH CARE EDUCATION/TRAINING PROGRAM

## 2022-07-27 PROCEDURE — 97530 THERAPEUTIC ACTIVITIES: CPT

## 2022-07-27 RX ORDER — OMEPRAZOLE 20 MG/1
20 CAPSULE, DELAYED RELEASE ORAL EVERY 12 HOURS
Status: DISCONTINUED | OUTPATIENT
Start: 2022-07-27 | End: 2022-08-04 | Stop reason: HOSPADM

## 2022-07-27 RX ADMIN — CALCIUM CARBONATE 500 MG: 500 TABLET, CHEWABLE ORAL at 00:34

## 2022-07-27 RX ADMIN — APIXABAN 5 MG: 5 TABLET, FILM COATED ORAL at 21:34

## 2022-07-27 RX ADMIN — LOSARTAN POTASSIUM 25 MG: 50 TABLET, FILM COATED ORAL at 04:57

## 2022-07-27 RX ADMIN — OXYCODONE HYDROCHLORIDE 10 MG: 10 TABLET ORAL at 08:12

## 2022-07-27 RX ADMIN — OXYCODONE HYDROCHLORIDE 10 MG: 10 TABLET ORAL at 15:33

## 2022-07-27 RX ADMIN — PANTOPRAZOLE SODIUM 40 MG: 40 INJECTION, POWDER, FOR SOLUTION INTRAVENOUS at 04:58

## 2022-07-27 RX ADMIN — LACTULOSE 15 ML: 20 SOLUTION ORAL at 04:58

## 2022-07-27 RX ADMIN — DRONEDARONE 400 MG: 400 TABLET, FILM COATED ORAL at 08:09

## 2022-07-27 RX ADMIN — LIDOCAINE 1 PATCH: 700 PATCH TOPICAL at 15:33

## 2022-07-27 RX ADMIN — ATORVASTATIN CALCIUM 20 MG: 20 TABLET, FILM COATED ORAL at 21:34

## 2022-07-27 RX ADMIN — CALCIUM CARBONATE 500 MG: 500 TABLET, CHEWABLE ORAL at 11:48

## 2022-07-27 RX ADMIN — OMEPRAZOLE 20 MG: 20 CAPSULE, DELAYED RELEASE ORAL at 17:34

## 2022-07-27 RX ADMIN — OXYCODONE HYDROCHLORIDE 10 MG: 10 TABLET ORAL at 21:34

## 2022-07-27 RX ADMIN — CALCIUM CARBONATE 500 MG: 500 TABLET, CHEWABLE ORAL at 19:43

## 2022-07-27 RX ADMIN — FERROUS SULFATE TAB 325 MG (65 MG ELEMENTAL FE) 325 MG: 325 (65 FE) TAB at 08:09

## 2022-07-27 RX ADMIN — INSULIN HUMAN 3 UNITS: 100 INJECTION, SOLUTION PARENTERAL at 21:34

## 2022-07-27 RX ADMIN — LOPERAMIDE HYDROCHLORIDE 2 MG: 2 CAPSULE ORAL at 21:34

## 2022-07-27 RX ADMIN — OXYCODONE HYDROCHLORIDE 10 MG: 10 TABLET ORAL at 00:34

## 2022-07-27 RX ADMIN — DRONEDARONE 400 MG: 400 TABLET, FILM COATED ORAL at 17:34

## 2022-07-27 RX ADMIN — APIXABAN 5 MG: 5 TABLET, FILM COATED ORAL at 08:09

## 2022-07-27 RX ADMIN — DAPTOMYCIN 570 MG: 500 INJECTION, POWDER, LYOPHILIZED, FOR SOLUTION INTRAVENOUS at 04:58

## 2022-07-27 ASSESSMENT — ENCOUNTER SYMPTOMS
ABDOMINAL PAIN: 1
DOUBLE VISION: 0
COUGH: 0
BLURRED VISION: 0
NAUSEA: 0
FEVER: 0
MYALGIAS: 1
DIARRHEA: 0
SORE THROAT: 0
DIZZINESS: 0
LOSS OF CONSCIOUSNESS: 0
CHILLS: 0
HEADACHES: 0
SHORTNESS OF BREATH: 0
PALPITATIONS: 0
VOMITING: 0
BACK PAIN: 1

## 2022-07-27 ASSESSMENT — COGNITIVE AND FUNCTIONAL STATUS - GENERAL
STANDING UP FROM CHAIR USING ARMS: A LOT
SUGGESTED CMS G CODE MODIFIER MOBILITY: CM
WALKING IN HOSPITAL ROOM: TOTAL
MOVING TO AND FROM BED TO CHAIR: UNABLE
MOBILITY SCORE: 7
MOVING FROM LYING ON BACK TO SITTING ON SIDE OF FLAT BED: UNABLE
CLIMB 3 TO 5 STEPS WITH RAILING: TOTAL
TURNING FROM BACK TO SIDE WHILE IN FLAT BAD: UNABLE

## 2022-07-27 ASSESSMENT — GAIT ASSESSMENTS: GAIT LEVEL OF ASSIST: UNABLE TO PARTICIPATE

## 2022-07-27 ASSESSMENT — PAIN DESCRIPTION - PAIN TYPE
TYPE: ACUTE PAIN

## 2022-07-27 NOTE — THERAPY
"Physical Therapy   Daily Treatment     Patient Name: April Alicia  Age:  62 y.o., Sex:  female  Medical Record #: [id number]  Today's Date: 7/27/2022     Precautions  Precautions: Fall Risk;Toe Touch Weight Bearing Left Lower Extremity  Comments: hinged knee brace LLE, STILL NO ORDERS IN CHART    Assessment    Pt continues to demonstrate difficulty with adhering to LLE TTWB. Lateral scooting on EOB was inititiated in preparation for slideboard wheelchair tranfers but limited by B UE weakness and impaired learning. Pt remains eager to progress and is consistently receptive to mobility. Will continue to follow.       Plan    Continue current treatment plan.    DC Equipment Recommendations: Unable to determine at this time  Discharge Recommendations: Recommend post-acute placement for additional physical therapy services prior to discharge home      Subjective    \"I can move my legs off the bed by myself now.\"      Objective       07/27/22 0950   Cosignature   Documentation Review Approved with modifications made by preceptor in flowsheet   Charge Group   Charges  Yes   PT Therapeutic Activities 3  (39 min)   Precautions   Precautions Fall Risk;Toe Touch Weight Bearing Left Lower Extremity   Comments hinged knee brace LLE, STILL NO ORDERS IN CHART   Vitals   O2 (LPM) 0   O2 Delivery Device None - Room Air   Pain 0 - 10 Group   Location Abdomen   Location Orientation Right   Therapist Pain Assessment Nurse Notified;During Activity  (pt stated pain R abdomen. pt also stated R arm still sore from \"COVID booster.\" Erythema noted on L thigh above wound dressing, nurse notified.)   Cognition    Cognition / Consciousness X   Speech/ Communication Hard of Hearing   Level of Consciousness Alert   Safety Awareness Impaired   New Learning Impaired   Attention Impaired   Comments Pt agreeable and motivated to progress, but demonstrates continued difficulty with adhering to L LE TTWB precautions   Balance   Sitting Balance " (Static) Fair   Sitting Balance (Dynamic) Fair   Weight Shift Sitting Poor   Skilled Intervention Compensatory Strategies;Sequencing;Verbal Cuing;Tactile Cuing   Comments used bed features to achieve sitting upright. no overt LOB.   Gait Analysis   Gait Level Of Assist Unable to Participate   Weight Bearing Status TTWB LLE   Vision Deficits Impacting Mobility NT   Bed Mobility    Supine to Sit Minimal Assist   Sit to Supine Minimal Assist  (with LE support)   Scooting Standby Assist   Skilled Intervention Verbal Cuing;Compensatory Strategies;Sequencing;Tactile Cuing   Comments increased time and effort. used bed features and HOB elevated. Able to independently move L LE from supine > sit but not sit > supine   Functional Mobility   Sit to Stand   (NT)   Mobility EOB lateral scooting with significant difficulty, including being unable to adhere to TTWB LLE.   Comments Pt performed EOB lateral scoots in preparation for SB transfers to wheelchair. Limited by weakness and fatigue, even with edu on head-hips relationship   How much difficulty does the patient currently have...   Turning over in bed (including adjusting bedclothes, sheets and blankets)? 1   Sitting down on and standing up from a chair with arms (e.g., wheelchair, bedside commode, etc.) 1   Moving from lying on back to sitting on the side of the bed? 1   How much help from another person does the patient currently need...   Moving to and from a bed to a chair (including a wheelchair)? 2   Need to walk in a hospital room? 1   Climbing 3-5 steps with a railing? 1   6 clicks Mobility Score 7   Activity Tolerance   Sitting Edge of Bed 25mins+   Comments limited by therapist given poor adherence to TTWB LLE and patient fatigue   Patient / Family Goals    Patient / Family Goal #1 walk again   Goal #1 Outcome Goal not met   Short Term Goals    Short Term Goal # 1 Patient will move supine<>sitting EOB without bed features with supervision within 6tx in order to  get in/out of bed   Goal Outcome # 1 goal not met   Short Term Goal # 2 Patient will move sitting<>standing with supervision within 6tx in order to initiate transfers   Goal Outcome # 2   (NT this tx)   Short Term Goal # 3 Patient will self propel WC 50ft with supervision within 6tx in order to access environment   Goal Outcome # 3 Goal not met   Short Term Goal # 4 Patient will ascend/descend FOS with supervision using depression lift/buttock bump up method within 6tx in order to access apartment   Goal Outcome # 4 Goal not met   Education Group   Education Provided Weight Bearing Precautions;Weight Bearing Status;Role of Physical Therapist   Role of Physical Therapist Patient Response Patient;Acceptance;Explanation;Verbal Demonstration   Weight Bearing Status Patient Response Patient;Acceptance;Explanation;Verbal Demonstration   Weight Bearing Precautions Patient Response Patient;Acceptance;Explanation;Demonstration;Reinforcement Needed;No Learning Evidence;Verbal Demonstration   Anticipated Discharge Equipment and Recommendations   DC Equipment Recommendations Unable to determine at this time   Discharge Recommendations Recommend post-acute placement for additional physical therapy services prior to discharge home   Interdisciplinary Plan of Care Collaboration   IDT Collaboration with  Nursing   Patient Position at End of Therapy In Bed;Bed Alarm On;Call Light within Reach;Tray Table within Reach;Phone within Reach   Collaboration Comments RN aware of visit, response.   Session Information   Date / Session Number  7/27 - 6 (2/3, 7/31)

## 2022-07-27 NOTE — PROGRESS NOTES
Evening assessment of patient complete. Patient is A&Ox4 on room air. Reports mild pain to her abdomen and knee, medicated with acetominophen. Patient denies any other needs at this time. Call light within reach.

## 2022-07-27 NOTE — CARE PLAN
The patient is Stable - Low risk of patient condition declining or worsening    Shift Goals  Clinical Goals: Continue supportive measures for wound healing; remain free of falls.  Patient Goals: To sit up at side of bed, maybe stand; get stronger.  Family Goals: n/a    Progress made toward(s) clinical / shift goals:  Left shoulder dressing intact; left leg dressing intact with prevena wound vac. Good appetite.    Patient is not progressing towards the following goals: Awaiting for appropriate discharge arrangements.      Problem: Self Care  Goal: Patient will have the ability to perform ADLs independently or with assistance (bathe, groom, dress, toilet and feed)  Outcome: Not Progressing   April is dependent on staff for ADLs due to limited mobility.      Problem: Pain - Standard  Goal: Alleviation of pain or a reduction in pain to the patient’s comfort goal  Outcome: Progressing   Pain treated with prn oxycodone and lidocaine patch, which allow April pain relief that allow her to sleep/relax.    Problem: Skin Integrity  Goal: Skin integrity is maintained or improved  Outcome: Progressing   Left shoulder dressing intact; left leg dressing intact with prevena wound vac.     Problem: Fall Risk  Goal: Patient will remain free from falls  Outcome: Progressing   No falls or injuries.    Problem: Hemodynamics  Goal: Patient's hemodynamics, fluid balance and neurologic status will be stable or improve  Outcome: Progressing   B/p stable; receiving eliquis, dronedarone, and losartan.    Problem: Nutrition  Goal: Patient's nutritional and fluid intake will be adequate or improve  Outcome: Progressing   Good appetite.    Problem: Mobility  Goal: Patient's capacity to carry out activities will improve  Outcome: Progressing   Mobility limited due to ordered toe-touch weight bearing to LLE, and to pain.    Problem: Wound/ / Incision Healing  Goal: Patient's wound/surgical incision will decrease in size and heals  properly  Outcome: Progressing   Left shoulder dressing intact; left leg dressing intact with prevena wound vac.  Receiving daptomycin.

## 2022-07-27 NOTE — PROGRESS NOTES
Hospital Medicine Daily Progress Note    Date of Service  7/27/2022    Chief Complaint  April Alicia is a 62 y.o. female admitted 7/10/2022 with hypotension, AMS    Hospital Course  61yo PMHx coccaine and ETOH abuse though sober x 8 years per her report, cirrhosis, asthma, AFib on apixaban, DM, HTN. Took an unknown narcotic.  In ED responded to narcan, labs showing Na 121, Cl 89, Creat 5.35, CXR showing cardiomegally.  Admitted to the floor with AMS, HypoNa, dehydration, sepsis from urinary source.  Subsequent blood cultures + Strep Species    Bacteremia with group B streptococcal bacteremia. ID consulted. -on Rocephin, repeated blood culture NTD. TTE showed  EF of 60%, negative for vegetation.     ELIZABETH-resolved    Hx of L knee arthroplasty, left knee pain and swelling. Xray notes New lucency adjacent to the tibial component of arthroplasty suspicious for loosening or infection. Orthopedics consulted, s/p L knee arthrocentesis 7/16 c/w infection. s/p L knee revision, total arthroplasty with antibiotic spacer and wound vac 7/19.     MRI left shoulder showed possible septic arthritis/bursitis. s/p Left shoulder arthrotomy and lavage for infection with partial   Synovectomy. Left shoulder incision and drainage of the subdeltoid abscess 7/17.    ID rec cont ceftriaxone 2gm daily end 8/30/2022 and then follow up in ID clinic at the end of antibiotics course. Need potential aspiration of the knee to ensure no infection prior to replacing the hardware.     Interval Problem Update  7/26: Patient seen sleeping soundly.  Hemovac dc'd.  She did have a temp of 38.1 at 7/25.  She is also noted to have pancytopenia while on Rocephin.  ID contact infectious disease who did agree and switch to daptomycin to continue through 8/30/2022.  Patient is not medically cleared until stabilization of her hemoglobin.  CBC daily and CPK weekly ordered while on daptomycin.  7/27: Patient feeling improved.  She is still complaining of  gastric pain.  She has been on Protonix IV twice daily I did switch it to Prilosec twice daily with Tums.  Her hemoglobin has increased from 7-7.3 after switching from Rocephin to daptomycin yesterday.  No obvious source of bleeding.  I have added a vitamin B12 level.  Ferritin was 213 and normal.  SONY drain fell out of left shoulder but otherwise shoulder incision appears clean dry and intact.  Left knee Prevena in place.    I have discussed this patient's plan of care and discharge plan at IDT rounds today with Case Management, Nursing, Nursing leadership, and other members of the IDT team.    Consultants/Specialty  ID  ortho    Code Status  Full Code    Disposition  Patient is medically cleared for discharge.    Changed Rocephin to daptomycin.  Occult blood stool pending.  Anticipate discharge to to skilled nursing facility.  TTWB LLE.  I have placed the appropriate orders for post-discharge needs.    Review of Systems  Review of Systems   Constitutional: Positive for malaise/fatigue. Negative for chills and fever.   HENT: Negative for nosebleeds and sore throat.    Eyes: Negative for blurred vision and double vision.   Respiratory: Negative for cough and shortness of breath.    Cardiovascular: Negative for chest pain, palpitations and leg swelling.   Gastrointestinal: Positive for abdominal pain (epigastric). Negative for diarrhea, nausea and vomiting.   Genitourinary: Negative for dysuria.   Musculoskeletal: Positive for back pain, joint pain and myalgias.        L arm pain; left knee pain and swelling    Skin: Negative for rash.   Neurological: Negative for dizziness, loss of consciousness and headaches.        Physical Exam  Temp:  [36.8 °C (98.2 °F)-37.6 °C (99.6 °F)] 37.3 °C (99.1 °F)  Pulse:  [68-74] 74  Resp:  [16-18] 16  BP: (105-126)/(62-71) 112/71  SpO2:  [97 %-100 %] 97 %    Physical Exam  Vitals reviewed.   Constitutional:       General: She is not in acute distress.     Appearance: She is  well-developed. She is ill-appearing. She is not diaphoretic.   HENT:      Head: Normocephalic and atraumatic.      Mouth/Throat:      Mouth: Mucous membranes are moist.   Eyes:      Extraocular Movements: Extraocular movements intact.      Conjunctiva/sclera: Conjunctivae normal.   Neck:      Vascular: No JVD.   Cardiovascular:      Rate and Rhythm: Normal rate and regular rhythm.      Pulses: Normal pulses.   Pulmonary:      Effort: Pulmonary effort is normal. No respiratory distress.      Breath sounds: No stridor. No wheezing or rales.   Abdominal:      Palpations: Abdomen is soft.      Tenderness: There is no abdominal tenderness. There is no guarding or rebound.   Musculoskeletal:         General: Swelling and tenderness present.      Cervical back: Neck supple. No rigidity or tenderness.      Right lower leg: No edema.      Left lower leg: No edema.      Comments: Left knee swelling, tenderness, status post surgery, on dressing, clean  Left shoulder clean dressing, SONY drain in place     Skin:     General: Skin is warm and dry.   Neurological:      Mental Status: She is alert and oriented to person, place, and time. Mental status is at baseline.   Psychiatric:         Mood and Affect: Mood normal.         Behavior: Behavior normal.         Thought Content: Thought content normal.         Fluids    Intake/Output Summary (Last 24 hours) at 7/27/2022 1329  Last data filed at 7/27/2022 0800  Gross per 24 hour   Intake --   Output 1400 ml   Net -1400 ml       Laboratory  Recent Labs     07/25/22 0315 07/26/22 0315 07/27/22  0207   WBC 4.3* 3.9* 4.3*   RBC 2.55* 2.47* 2.61*   HEMOGLOBIN 7.2* 7.0* 7.3*   HEMATOCRIT 21.9* 21.5* 22.3*   MCV 85.9 87.0 85.4   MCH 28.2 28.3 28.0   MCHC 32.9* 32.6* 32.7*   RDW 47.6 48.0 46.7   PLATELETCT 125* 128* 122*   MPV 9.4 9.8 9.3     Recent Labs     07/25/22 0315 07/26/22 0315   SODIUM 132* 133*   POTASSIUM 4.3 3.9   CHLORIDE 107 106   CO2 18* 19*   GLUCOSE 109* 115*   BUN 19 17    CREATININE 0.67 0.65   CALCIUM 7.5* 7.3*                   Imaging  US-EXTREMITY VENOUS UPPER UNILAT LEFT   Final Result      IR-PICC LINE PLACEMENT W/ GUIDANCE > AGE 5   Final Result                  Ultrasound-guided PICC placement performed by qualified nursing staff as    above.          DX-CHEST-FOR LINE PLACEMENT Perform procedure in: Patient's Room   Final Result      1.  Peripherally inserted catheter has been placed and the tip projects appropriately over the superior vena cava.   2.  Stable enlargement of the cardiomediastinal silhouette.   3.  Stable patchy bilateral interstitial opacities.      DX-KNEE 2- LEFT   Final Result      Status post removal of the tibial component of the left knee arthroplasty      MR-SHOULDER-W/O LEFT   Final Result      1.  Large joint effusion with a large amount of fluid seen extending into the subacromial/subdeltoid bursa with synovitis and small low signal foci within the bursa consistent with pockets of gas. These findings are very suspicious for septic arthritis    and septic bursitis.      2.  Complete full-thickness tear of the supraspinatus tendon with retraction to the bony glenoid.      3.  Full-thickness tear portion of the subscapularis tendon.      4.  Partial-thickness tear of the articular surface fibers of the infraspinatus tendon.      5.  Prominent muscle edema and atrophy involving the rotator cuff as well as all the muscles surrounding the shoulder which may represent myositis.      6.  Osteoarthritis of the glenohumeral joint.      7.  Diffuse tearing of the glenoid labrum.      8.  Subluxation of the long head of the biceps tendon medially from the bicipital groove with marked thinning of the intra-articular portion consistent with tendinosis.      9.  Extensive soft tissue edema to include fat and muscle involving all of the imaged structures around the shoulder likely representing cellulitis.      10.  Widening of the acromioclavicular joint with  fragmentation of the distal clavicle consistent with old posttraumatic change versus surgical change.      FO-PCLKZKY-0 VIEW   Final Result      Nonobstructive bowel gas pattern.      EC-ECHOCARDIOGRAM COMPLETE W/O CONT   Final Result      DX-CHEST-PORTABLE (1 VIEW)   Final Result      1.  Interstitial infiltrates versus edema.   2.  Cardiomegaly.      DX-KNEE 2- RIGHT   Final Result      1.  Negative for fracture or malalignment      2.  Right knee arthroplasty appears within normal limits      DX-KNEE 2- LEFT   Final Result      1.  New lucency adjacent to the tibial component of arthroplasty suspicious for loosening or infection      2.  No other finding      US-EXTREMITY VENOUS UPPER UNILAT LEFT   Final Result      US-RENAL   Final Result      1.  Unremarkable kidneys.   2.  Limited evaluation of the bladder.      CT-ABDOMEN-PELVIS W/O   Final Result         Limited exam due to lack of IV contrast.         1. Mild diffuse wall thickening of the urinary bladder could relate to infection or inflammation. Correlate with UA. No hydronephrosis.   2. Nonspecific mildly prominent fluid-filled proximal small bowel in the left abdomen. This could be seen with enteritis, focal ileus or early obstruction.   3. Cirrhotic liver with portal hypertension and splenomegaly. Wall thickening of the ascending colon could relate to portal hypertension.   4. No ascites.      DX-SHOULDER 2+ LEFT   Final Result      1.  No evidence of acute fracture or dislocation.      2.  Old posttraumatic changes of the distal clavicle.      DX-CHEST-PORTABLE (1 VIEW)   Final Result      Cardiomegaly.           Assessment/Plan  * Bacteremia- (present on admission)  Assessment & Plan  2 bottles with group B strep   Patient reported history of severe bacteremia a year ago after knee surgery last year, which required skin grafting of both lower extremity and was hospitalized at [place] when she visited her son there  Repeat blood cultures NTD    Cont  Rocephin  Check TTE negative for vegetation  ID rec cont daptomycin daily end 8/30/2022 and then follow up in ID clinic at the end of antibiotics course. Need potential aspiration of the knee to ensure no infection prior to replacing the hardware.       Drug-induced pancytopenia (HCC)  Assessment & Plan  Secondary to Rocephin.  7/26:  Changed rocephin to daptomycin IV.  Check daily CBC.  CPK and CBC weekly while on daptomycin.    Septic arthritis of knee, left (HCC)  Assessment & Plan  History of left knee replacement 2017, which was complicated with infection 2 weeks later  Patient had recurrent left knee infection last year which was complicated with bacteremia and she was hospitalized at the [place] hospital when she visited her son     Xray notes New lucency adjacent to the tibial component of arthroplasty suspicious for loosening or infection.   Orthopedics consulted, s/p L knee arthrocentesis 7/16 c/w infection.   s/p L knee resection and antibiotic spacer 7/19  ID rec cont daptomycin IV daily end 8/30/2022 and then follow up in ID clinic at the end of antibiotics course. Need potential aspiration of the knee to ensure no infection prior to replacing the hardware.   TTWB LLE per ortho.    Septic arthritis of shoulder, left (HCC)- (present on admission)  Assessment & Plan  MRI left shoulder showed septic arthritis/subdeltoid abscess    Ortho consulted,  s/p Left shoulder arthrotomy and lavage for infection with partial synovectomy. Left shoulder incision and drainage of the subdeltoid abscess 7/17.  SONY drain out 7/27.  fluid culture NTD  On daptomycin until 8/30.    Iron deficiency anemia  Assessment & Plan  Acute on chronic  Iron study consistent with iron deficiency  Start po iron supplements  Check FOBT given remote hx of gastric ulcer and abdominal pain. On Eliquis. On PPI.  Per bedside RN, stool does not appear melanotic  Likely 2/2 to Rocephin given pancytopenia.    Hyponatremia- (present on  admission)  Assessment & Plan  123 on admission  High SG on UA: probable hypovolemic and alcohol use  Cont to monitor    Resolved    Acute cystitis without hematuria  Assessment & Plan  On Rocephin/now daptomycin for bacteremia    Metabolic encephalopathy- (present on admission)  Assessment & Plan  Likely multifactorial in conjunction with narcotic use  S/p Narcan treatment, improved  Start lacutlose  Cont to treat infection     resolved      Hyperlipidemia- (present on admission)  Assessment & Plan  Continue Lipitor    ELIZABETH (acute kidney injury) (HCC)- (present on admission)  Assessment & Plan  Likely secondary dehydration  4.7 on admission   Good UOP  DC IVFs  Cont to monitor  Renally dose medications as appropriate    Resolved    Atrial fibrillation (HCC)- (present on admission)  Assessment & Plan  On apixaban and dronedarone as outpt  Eliquis resumed post surgery   Rate controlled     Cirrhosis (HCC)- (present on admission)  Assessment & Plan  Secondary to ETOH  Lactulose  monitor    Hypertension- (present on admission)  Assessment & Plan  Resume the home losartan    Controlled type 2 diabetes mellitus with hyperglycemia, without long-term current use of insulin (HCC)- (present on admission)  Assessment & Plan  Sliding-scale insulin, diabetic diet  A1c 5.7%         VTE prophylaxis: SCDs/TEDs and therapeutic anticoagulation with Eliquis (on hold)    I have performed a physical exam and reviewed and updated ROS and Plan today (7/27/2022). In review of yesterday's note (7/26/2022), there are no changes except as documented above.

## 2022-07-27 NOTE — CARE PLAN
Problem: Pain - Standard  Goal: Alleviation of pain or a reduction in pain to the patient’s comfort goal  Outcome: Progressing     Problem: Nutrition  Goal: Patient's nutritional and fluid intake will be adequate or improve  Outcome: Progressing   The patient is Stable - Low risk of patient condition declining or worsening    Shift Goals  Clinical Goals: pain control, safety  Patient Goals: pain control, sleep  Family Goals: n/a    Progress made toward(s) clinical / shift goals:  patient encouraged to report pain as necessary, medicated per MAR, patient engages in pain relief techniques; patient encouraged to finish meal, provided snacks, provided water     Patient is not progressing towards the following goals:

## 2022-07-27 NOTE — PROGRESS NOTES
Surgery:  Left TKA resection and antibiotic spacer  Date of surgery: 7/19/22    Subjective: s/p L knee resection and antibiotic spacer. Pain has been relatively well controlled. Pancytopenia with CTX and she was transitioned to Dapto. Hgb still <8.       Objective:   General: awake, alert, no distress  MSK:   LLE demonstrates dressing is intact and clean and dry. Preveena dressing is holding suction. She demonstrates EHL, FHL, TA and GSC> Grossly intact sensation in L2-S1 distribution. Toes warm and well perfused.     Cultures: NGTD from knee          Labs:   Lab Results   Component Value Date/Time    WBC 3.9 (L) 07/26/2022 03:15 AM    RBC 2.47 (L) 07/26/2022 03:15 AM    HEMOGLOBIN 7.0 (L) 07/26/2022 03:15 AM    HEMATOCRIT 21.5 (L) 07/26/2022 03:15 AM    MCV 87.0 07/26/2022 03:15 AM    MCH 28.3 07/26/2022 03:15 AM    MCHC 32.6 (L) 07/26/2022 03:15 AM    MPV 9.8 07/26/2022 03:15 AM    NEUTSPOLYS 85.30 (H) 07/19/2022 01:05 AM    LYMPHOCYTES 8.10 (L) 07/19/2022 01:05 AM    MONOCYTES 5.00 07/19/2022 01:05 AM    EOSINOPHILS 0.40 07/19/2022 01:05 AM    BASOPHILS 0.10 07/19/2022 01:05 AM    HYPOCHROMIA 1+ 03/12/2009 06:50 PM    ANISOCYTOSIS 1+ 06/11/2006 05:30 AM        Assessment/Plan:   s/p L knee resection and antibiotic spacer. Doing well.   1. TTWB LLE in knee immobilizer  2. Preveena dressing to remain in place x7 days then transition to dry dressing  3. Staples to stay until 14 days post op  4. DVT ppx: per primary; likely lovenox 40 mg daily  5. Further procedures pending resolution of infection.   6. Appreciate ID recs  7. Patient will likely have extended course of antibiotic spacer with possible exchange spacer versus reimplantation at 3 months post-op  8. Call me with any questions/concerns in the interim. Patient will need follow up in my clinic at 2 weeks if she is discharged prior to that.

## 2022-07-27 NOTE — DISCHARGE PLANNING
Agency/Facility Name: [facility]  Spoke To: Rory  Outcome: DPA  was notified referral is still under review. Rory to call this DPA back once Svetlana reviews referral.     1020:  Agency/Facility Name: [facility]  Outcome: DPA left a voicemail regarding referral status. DPA requesting a call back.     1120:  Agency/Facility Name: [facility]  Spoke To: Daisy  Outcome: DPA was notified Pt was declined due to bed not being available.     1125:  Agency/Facility Name: [facility]  Spoke To: Rory  Outcome: DPA was notified Pt has been denied due to insurance not covering dapto.     SW notified.

## 2022-07-27 NOTE — DISCHARGE PLANNING
Case Management Discharge Planning    Admission Date: 7/10/2022  GMLOS: 9.6  ALOS: 17    6-Clicks ADL Score: 14  6-Clicks Mobility Score: 7  PT and/or OT Eval ordered: Yes  Post-acute Referrals Ordered: Yes  Post-acute Choice Obtained: Yes  Has referral(s) been sent to post-acute provider:  Yes      Anticipated Discharge Dispo:      DME Needed: No    Action(s) Taken: OTHER  Spoke with patient at bedside.  2 patient identifiers used to confirm patient ID.  Discussed SNF declinations.  Patient gave choice to widen search.  Referrals sent.  She asks me to call and update her daughter Ava.  Called.  to reply.  Generic VM left requesting call back    Escalations Completed: None    Medically Clear: No    Next Steps: Follow up on new SNF referrals    Barriers to Discharge: Medical clearance and Pending Placement    Is the patient up for discharge tomorrow: No

## 2022-07-27 NOTE — PROGRESS NOTES
"   Orthopaedic Progress Note    Interval changes:  Patient doing well  Vac in place to left knee without leak  LUE dressings CDI     ROS - Patient denies any new issues.  Pain well controlled.    /71   Pulse 74   Temp 37.3 °C (99.1 °F) (Temporal)   Resp 16   Ht 1.715 m (5' 7.5\")   Wt 95.1 kg (209 lb 10.5 oz)   SpO2 97%       Patient seen and examined  No acute distress  Breathing non labored  RRR  LUE dressing CDI, DNVI, moves all fingers, cap refill <2 sec.  LLE vac in place without leak CDI, DNVI, moves all toes, cap refill <2 sec.     Recent Labs     07/25/22  0315 07/26/22  0315 07/27/22  0207   WBC 4.3* 3.9* 4.3*   RBC 2.55* 2.47* 2.61*   HEMOGLOBIN 7.2* 7.0* 7.3*   HEMATOCRIT 21.9* 21.5* 22.3*   MCV 85.9 87.0 85.4   MCH 28.2 28.3 28.0   MCHC 32.9* 32.6* 32.7*   RDW 47.6 48.0 46.7   PLATELETCT 125* 128* 122*   MPV 9.4 9.8 9.3       Active Hospital Problems    Diagnosis    • Hypertension [I10]      Priority: Medium   • Controlled type 2 diabetes mellitus with hyperglycemia, without long-term current use of insulin ([facility]) [E11.65]      Priority: Medium   • Cirrhosis ([facility]) [K74.60]      Priority: Low   • Drug-induced pancytopenia ([facility]) [D61.811]    • Septic arthritis of knee, left ([facility]) [M00.9]    • Septic arthritis of shoulder, left (HCC) [M00.9]    • Iron deficiency anemia [D50.9]    • Acute cystitis without hematuria [N30.00]    • Bacteremia [R78.81]    • Hyponatremia [E87.1]    • ELIZABETH (acute kidney injury) ([facility]) [N17.9]    • Hyperlipidemia [E78.5]    • Metabolic encephalopathy [G93.41]    • Atrial fibrillation ([facility]) [I48.91]        Assessment/Plan:  Patient doing well  POD#8 S/P  1.  left total knee arthroplasty explantation  2. left knee insertion of articulating spacer   3. left knee application of incisional wound vacuum   POD#10 S/P   1.  Left shoulder arthrotomy and lavage for infection with partial synovectomy.  2.  Left shoulder incision and drainage of the subdeltoid abscess.  Wt bearing " status - WBAT  Wound care/Drains - Dressings to be changed every other day by nursing  Future Procedures - none planned   Sutures/Staples out- 14 days post operatively  PT/OT-initiated  Antibiotics: dapto 570mg IV qd,      DVT Prophylaxis- TEDS/SCDs/Foot pumps  Amaro-none  Case Coordination for Discharge Planning - Disposition pending abx needs

## 2022-07-28 ENCOUNTER — APPOINTMENT (OUTPATIENT)
Dept: RADIOLOGY | Facility: MEDICAL CENTER | Age: 62
DRG: 466 | End: 2022-07-28
Attending: HOSPITALIST
Payer: MEDICAID

## 2022-07-28 PROBLEM — D68.9 COAGULOPATHY (HCC): Status: ACTIVE | Noted: 2022-07-28

## 2022-07-28 PROBLEM — B19.20 HEPATITIS C INFECTION: Status: ACTIVE | Noted: 2022-07-28

## 2022-07-28 LAB
ABO GROUP BLD: NORMAL
BARCODED ABORH UBTYP: 9500
BARCODED PRD CODE UBPRD: NORMAL
BARCODED UNIT NUM UBUNT: NORMAL
BASOPHILS # BLD AUTO: 0.8 % (ref 0–1.8)
BASOPHILS # BLD: 0.03 K/UL (ref 0–0.12)
BLD GP AB SCN SERPL QL: NORMAL
COMPONENT R 8504R: NORMAL
EOSINOPHIL # BLD AUTO: 0.14 K/UL (ref 0–0.51)
EOSINOPHIL NFR BLD: 3.6 % (ref 0–6.9)
ERYTHROCYTE [DISTWIDTH] IN BLOOD BY AUTOMATED COUNT: 46.5 FL (ref 35.9–50)
GLUCOSE BLD STRIP.AUTO-MCNC: 134 MG/DL (ref 65–99)
GLUCOSE BLD STRIP.AUTO-MCNC: 136 MG/DL (ref 65–99)
GLUCOSE BLD STRIP.AUTO-MCNC: 140 MG/DL (ref 65–99)
GLUCOSE BLD STRIP.AUTO-MCNC: 189 MG/DL (ref 65–99)
HCT VFR BLD AUTO: 20.7 % (ref 37–47)
HGB BLD-MCNC: 6.8 G/DL (ref 12–16)
HGB BLD-MCNC: 7.9 G/DL (ref 12–16)
IMM GRANULOCYTES # BLD AUTO: 0.02 K/UL (ref 0–0.11)
IMM GRANULOCYTES NFR BLD AUTO: 0.5 % (ref 0–0.9)
INR PPP: 2.25 (ref 0.87–1.13)
LYMPHOCYTES # BLD AUTO: 1.16 K/UL (ref 1–4.8)
LYMPHOCYTES NFR BLD: 30.2 % (ref 22–41)
MCH RBC QN AUTO: 28.2 PG (ref 27–33)
MCHC RBC AUTO-ENTMCNC: 32.9 G/DL (ref 33.6–35)
MCV RBC AUTO: 85.9 FL (ref 81.4–97.8)
MONOCYTES # BLD AUTO: 0.54 K/UL (ref 0–0.85)
MONOCYTES NFR BLD AUTO: 14.1 % (ref 0–13.4)
NEUTROPHILS # BLD AUTO: 1.95 K/UL (ref 2–7.15)
NEUTROPHILS NFR BLD: 50.8 % (ref 44–72)
NRBC # BLD AUTO: 0 K/UL
NRBC BLD-RTO: 0 /100 WBC
PLATELET # BLD AUTO: 105 K/UL (ref 164–446)
PMV BLD AUTO: 10.1 FL (ref 9–12.9)
PRODUCT TYPE UPROD: NORMAL
PROTHROMBIN TIME: 24.3 SEC (ref 12–14.6)
RBC # BLD AUTO: 2.41 M/UL (ref 4.2–5.4)
RH BLD: NORMAL
UNIT STATUS USTAT: NORMAL
VIT B12 SERPL-MCNC: 1567 PG/ML (ref 211–911)
WBC # BLD AUTO: 3.8 K/UL (ref 4.8–10.8)

## 2022-07-28 PROCEDURE — 700102 HCHG RX REV CODE 250 W/ 637 OVERRIDE(OP): Performed by: STUDENT IN AN ORGANIZED HEALTH CARE EDUCATION/TRAINING PROGRAM

## 2022-07-28 PROCEDURE — 85025 COMPLETE CBC W/AUTO DIFF WBC: CPT

## 2022-07-28 PROCEDURE — A9270 NON-COVERED ITEM OR SERVICE: HCPCS | Performed by: STUDENT IN AN ORGANIZED HEALTH CARE EDUCATION/TRAINING PROGRAM

## 2022-07-28 PROCEDURE — 700105 HCHG RX REV CODE 258: Performed by: HOSPITALIST

## 2022-07-28 PROCEDURE — 82607 VITAMIN B-12: CPT

## 2022-07-28 PROCEDURE — 86901 BLOOD TYPING SEROLOGIC RH(D): CPT

## 2022-07-28 PROCEDURE — 36430 TRANSFUSION BLD/BLD COMPNT: CPT

## 2022-07-28 PROCEDURE — A9270 NON-COVERED ITEM OR SERVICE: HCPCS | Performed by: HOSPITALIST

## 2022-07-28 PROCEDURE — 700101 HCHG RX REV CODE 250

## 2022-07-28 PROCEDURE — 700102 HCHG RX REV CODE 250 W/ 637 OVERRIDE(OP): Performed by: HOSPITALIST

## 2022-07-28 PROCEDURE — 770001 HCHG ROOM/CARE - MED/SURG/GYN PRIV*

## 2022-07-28 PROCEDURE — 99232 SBSQ HOSP IP/OBS MODERATE 35: CPT | Performed by: HOSPITALIST

## 2022-07-28 PROCEDURE — 700105 HCHG RX REV CODE 258

## 2022-07-28 PROCEDURE — 85018 HEMOGLOBIN: CPT

## 2022-07-28 PROCEDURE — P9016 RBC LEUKOCYTES REDUCED: HCPCS

## 2022-07-28 PROCEDURE — 82962 GLUCOSE BLOOD TEST: CPT

## 2022-07-28 PROCEDURE — 700102 HCHG RX REV CODE 250 W/ 637 OVERRIDE(OP)

## 2022-07-28 PROCEDURE — 86900 BLOOD TYPING SEROLOGIC ABO: CPT

## 2022-07-28 PROCEDURE — 85610 PROTHROMBIN TIME: CPT

## 2022-07-28 PROCEDURE — A9270 NON-COVERED ITEM OR SERVICE: HCPCS

## 2022-07-28 PROCEDURE — 700111 HCHG RX REV CODE 636 W/ 250 OVERRIDE (IP): Performed by: HOSPITALIST

## 2022-07-28 PROCEDURE — 86923 COMPATIBILITY TEST ELECTRIC: CPT

## 2022-07-28 PROCEDURE — 73120 X-RAY EXAM OF HAND: CPT | Mod: RT

## 2022-07-28 PROCEDURE — 36415 COLL VENOUS BLD VENIPUNCTURE: CPT

## 2022-07-28 PROCEDURE — 86850 RBC ANTIBODY SCREEN: CPT

## 2022-07-28 PROCEDURE — 82306 VITAMIN D 25 HYDROXY: CPT

## 2022-07-28 RX ORDER — SODIUM CHLORIDE 9 MG/ML
INJECTION, SOLUTION INTRAVENOUS CONTINUOUS
Status: ACTIVE | OUTPATIENT
Start: 2022-07-28 | End: 2022-07-28

## 2022-07-28 RX ADMIN — INSULIN HUMAN 3 UNITS: 100 INJECTION, SOLUTION PARENTERAL at 17:14

## 2022-07-28 RX ADMIN — CALCIUM CARBONATE 500 MG: 500 TABLET, CHEWABLE ORAL at 15:25

## 2022-07-28 RX ADMIN — OXYCODONE HYDROCHLORIDE 10 MG: 10 TABLET ORAL at 04:14

## 2022-07-28 RX ADMIN — CALCIUM CARBONATE 500 MG: 500 TABLET, CHEWABLE ORAL at 21:03

## 2022-07-28 RX ADMIN — OMEPRAZOLE 20 MG: 20 CAPSULE, DELAYED RELEASE ORAL at 04:14

## 2022-07-28 RX ADMIN — ATORVASTATIN CALCIUM 20 MG: 20 TABLET, FILM COATED ORAL at 21:03

## 2022-07-28 RX ADMIN — DRONEDARONE 400 MG: 400 TABLET, FILM COATED ORAL at 08:25

## 2022-07-28 RX ADMIN — OXYCODONE HYDROCHLORIDE 10 MG: 10 TABLET ORAL at 21:37

## 2022-07-28 RX ADMIN — SODIUM CHLORIDE: 9 INJECTION, SOLUTION INTRAVENOUS at 10:50

## 2022-07-28 RX ADMIN — LACTULOSE 15 ML: 20 SOLUTION ORAL at 04:14

## 2022-07-28 RX ADMIN — LIDOCAINE 1 PATCH: 700 PATCH TOPICAL at 15:25

## 2022-07-28 RX ADMIN — OXYCODONE HYDROCHLORIDE 10 MG: 10 TABLET ORAL at 15:23

## 2022-07-28 RX ADMIN — CALCIUM CARBONATE 500 MG: 500 TABLET, CHEWABLE ORAL at 08:25

## 2022-07-28 RX ADMIN — DAPTOMYCIN 570 MG: 500 INJECTION, POWDER, LYOPHILIZED, FOR SOLUTION INTRAVENOUS at 06:18

## 2022-07-28 RX ADMIN — DRONEDARONE 400 MG: 400 TABLET, FILM COATED ORAL at 17:13

## 2022-07-28 RX ADMIN — LOSARTAN POTASSIUM 25 MG: 50 TABLET, FILM COATED ORAL at 04:15

## 2022-07-28 RX ADMIN — OMEPRAZOLE 20 MG: 20 CAPSULE, DELAYED RELEASE ORAL at 17:13

## 2022-07-28 RX ADMIN — FERROUS SULFATE TAB 325 MG (65 MG ELEMENTAL FE) 325 MG: 325 (65 FE) TAB at 08:25

## 2022-07-28 ASSESSMENT — ENCOUNTER SYMPTOMS
SORE THROAT: 0
NAUSEA: 0
FEVER: 0
COUGH: 0
PALPITATIONS: 0
VOMITING: 0
ABDOMINAL PAIN: 1
BLURRED VISION: 0
CHILLS: 0
HEADACHES: 0
DIARRHEA: 0
DOUBLE VISION: 0
DIZZINESS: 0
SHORTNESS OF BREATH: 0
BACK PAIN: 1
LOSS OF CONSCIOUSNESS: 0
MYALGIAS: 1

## 2022-07-28 ASSESSMENT — PAIN DESCRIPTION - PAIN TYPE
TYPE: ACUTE PAIN
TYPE: CHRONIC PAIN;ACUTE PAIN
TYPE: ACUTE PAIN

## 2022-07-28 NOTE — ASSESSMENT & PLAN NOTE
INR 2.25.  H/o alcoholic cirrhosis of liver and Hepatitis C  7/28 dc'd eliquis for afib since requiring blood transfusion for Hg 6.8.

## 2022-07-28 NOTE — PROGRESS NOTES
Hospital Medicine Daily Progress Note    Date of Service  7/28/2022    Chief Complaint  April Alicia is a 62 y.o. female admitted 7/10/2022 with hypotension, AMS    Hospital Course  61yo PMHx coccaine and ETOH abuse though sober x 8 years per her report, cirrhosis, asthma, AFib on apixaban, DM, HTN. Took an unknown narcotic.  In ED responded to narcan, labs showing Na 121, Cl 89, Creat 5.35, CXR showing cardiomegally.  Admitted to the floor with AMS, HypoNa, dehydration, sepsis from urinary source.  Subsequent blood cultures + Strep Species    Bacteremia with group B streptococcal bacteremia. ID consulted. -on Rocephin, repeated blood culture NTD. TTE showed  EF of 60%, negative for vegetation.     ELIZABETH-resolved    Hx of L knee arthroplasty, left knee pain and swelling. Xray notes New lucency adjacent to the tibial component of arthroplasty suspicious for loosening or infection. Orthopedics consulted, s/p L knee arthrocentesis 7/16 c/w infection. s/p L knee revision, total arthroplasty with antibiotic spacer and wound vac 7/19.     MRI left shoulder showed possible septic arthritis/bursitis. s/p Left shoulder arthrotomy and lavage for infection with partial   Synovectomy. Left shoulder incision and drainage of the subdeltoid abscess 7/17.    ID rec cont ceftriaxone 2gm daily end 8/30/2022 and then follow up in ID clinic at the end of antibiotics course. Need potential aspiration of the knee to ensure no infection prior to replacing the hardware.     Interval Problem Update  7/26: Patient seen sleeping soundly.  Hemovac dc'd.  She did have a temp of 38.1 at 7/25.  She is also noted to have pancytopenia while on Rocephin.  ID contact infectious disease who did agree and switch to daptomycin to continue through 8/30/2022.  Patient is not medically cleared until stabilization of her hemoglobin.  CBC daily and CPK weekly ordered while on daptomycin.  7/27: Patient feeling improved.  She is still complaining of  gastric pain.  She has been on Protonix IV twice daily I did switch it to Prilosec twice daily with Tums.  Her hemoglobin has increased from 7-7.3 after switching from Rocephin to daptomycin yesterday.  No obvious source of bleeding.  I have added a vitamin B12 level.  Ferritin was 213 and normal.  SONY drain fell out of left shoulder but otherwise shoulder incision appears clean dry and intact.  Left knee Prevena in place.  7/28: Patient states she was treated for hepatitis C in [place] previously.  She was a previous heavy alcohol user as well.  She has been clean and sober for over a year.  Blood hemoglobin dropped to 6.8 this AM.  Status post 1 unit packed red blood cells.  INR 2.25 from liver disease.  I have ordered repeat hemoglobin posttransfusion and discontinued Eliquis since she is only taking it for atrial fibrillation.    I have discussed this patient's plan of care and discharge plan at IDT rounds today with Case Management, Nursing, Nursing leadership, and other members of the IDT team.    Consultants/Specialty  ID  ortho    Code Status  Full Code    Disposition  Patient is medically cleared for discharge.    Changed Rocephin to daptomycin.  Occult blood stool pending.  Anticipate discharge to to skilled nursing facility.  TTWB LLE.  I have placed the appropriate orders for post-discharge needs.    Review of Systems  Review of Systems   Constitutional: Positive for malaise/fatigue. Negative for chills and fever.   HENT: Negative for nosebleeds and sore throat.    Eyes: Negative for blurred vision and double vision.   Respiratory: Negative for cough and shortness of breath.    Cardiovascular: Negative for chest pain, palpitations and leg swelling.   Gastrointestinal: Positive for abdominal pain (epigastric). Negative for diarrhea, nausea and vomiting.   Genitourinary: Negative for dysuria.   Musculoskeletal: Positive for back pain, joint pain and myalgias.        L arm pain; left knee pain and  swelling    Skin: Negative for rash.   Neurological: Negative for dizziness, loss of consciousness and headaches.        Physical Exam  Temp:  [36.7 °C (98.1 °F)-37.7 °C (99.9 °F)] 37.5 °C (99.5 °F)  Pulse:  [67-89] 73  Resp:  [16-18] 18  BP: ()/(55-74) 102/62  SpO2:  [96 %-100 %] 97 %    Physical Exam  Vitals reviewed.   Constitutional:       General: She is not in acute distress.     Appearance: She is well-developed. She is ill-appearing. She is not diaphoretic.   HENT:      Head: Normocephalic and atraumatic.      Mouth/Throat:      Mouth: Mucous membranes are moist.   Eyes:      Extraocular Movements: Extraocular movements intact.      Conjunctiva/sclera: Conjunctivae normal.   Neck:      Vascular: No JVD.   Cardiovascular:      Rate and Rhythm: Normal rate and regular rhythm.      Pulses: Normal pulses.   Pulmonary:      Effort: Pulmonary effort is normal. No respiratory distress.      Breath sounds: No stridor. No wheezing or rales.   Abdominal:      Palpations: Abdomen is soft.      Tenderness: There is no abdominal tenderness. There is no guarding or rebound.   Musculoskeletal:         General: Swelling and tenderness present.      Cervical back: Neck supple. No rigidity or tenderness.      Right lower leg: No edema.      Left lower leg: No edema.      Comments: Left knee swelling, tenderness, status post surgery, on dressing, clean  Left shoulder clean dressing, SONY drain in place     Skin:     General: Skin is warm and dry.   Neurological:      Mental Status: She is alert and oriented to person, place, and time. Mental status is at baseline.   Psychiatric:         Mood and Affect: Mood normal.         Behavior: Behavior normal.         Thought Content: Thought content normal.         Fluids    Intake/Output Summary (Last 24 hours) at 7/28/2022 1551  Last data filed at 7/28/2022 1301  Gross per 24 hour   Intake 934.5 ml   Output 700 ml   Net 234.5 ml       Laboratory  Recent Labs     07/26/22  3366  07/27/22  0207 07/28/22  0303   WBC 3.9* 4.3* 3.8*   RBC 2.47* 2.61* 2.41*   HEMOGLOBIN 7.0* 7.3* 6.8*   HEMATOCRIT 21.5* 22.3* 20.7*   MCV 87.0 85.4 85.9   MCH 28.3 28.0 28.2   MCHC 32.6* 32.7* 32.9*   RDW 48.0 46.7 46.5   PLATELETCT 128* 122* 105*   MPV 9.8 9.3 10.1     Recent Labs     07/26/22  0315   SODIUM 133*   POTASSIUM 3.9   CHLORIDE 106   CO2 19*   GLUCOSE 115*   BUN 17   CREATININE 0.65   CALCIUM 7.3*     Recent Labs     07/28/22  0654   INR 2.25*               Imaging  US-EXTREMITY VENOUS UPPER UNILAT LEFT   Final Result      IR-PICC LINE PLACEMENT W/ GUIDANCE > AGE 5   Final Result                  Ultrasound-guided PICC placement performed by qualified nursing staff as    above.          DX-CHEST-FOR LINE PLACEMENT Perform procedure in: Patient's Room   Final Result      1.  Peripherally inserted catheter has been placed and the tip projects appropriately over the superior vena cava.   2.  Stable enlargement of the cardiomediastinal silhouette.   3.  Stable patchy bilateral interstitial opacities.      DX-KNEE 2- LEFT   Final Result      Status post removal of the tibial component of the left knee arthroplasty      MR-SHOULDER-W/O LEFT   Final Result      1.  Large joint effusion with a large amount of fluid seen extending into the subacromial/subdeltoid bursa with synovitis and small low signal foci within the bursa consistent with pockets of gas. These findings are very suspicious for septic arthritis    and septic bursitis.      2.  Complete full-thickness tear of the supraspinatus tendon with retraction to the bony glenoid.      3.  Full-thickness tear portion of the subscapularis tendon.      4.  Partial-thickness tear of the articular surface fibers of the infraspinatus tendon.      5.  Prominent muscle edema and atrophy involving the rotator cuff as well as all the muscles surrounding the shoulder which may represent myositis.      6.  Osteoarthritis of the glenohumeral joint.      7.  Diffuse  tearing of the glenoid labrum.      8.  Subluxation of the long head of the biceps tendon medially from the bicipital groove with marked thinning of the intra-articular portion consistent with tendinosis.      9.  Extensive soft tissue edema to include fat and muscle involving all of the imaged structures around the shoulder likely representing cellulitis.      10.  Widening of the acromioclavicular joint with fragmentation of the distal clavicle consistent with old posttraumatic change versus surgical change.      IQ-HUTLVGF-1 VIEW   Final Result      Nonobstructive bowel gas pattern.      EC-ECHOCARDIOGRAM COMPLETE W/O CONT   Final Result      DX-CHEST-PORTABLE (1 VIEW)   Final Result      1.  Interstitial infiltrates versus edema.   2.  Cardiomegaly.      DX-KNEE 2- RIGHT   Final Result      1.  Negative for fracture or malalignment      2.  Right knee arthroplasty appears within normal limits      DX-KNEE 2- LEFT   Final Result      1.  New lucency adjacent to the tibial component of arthroplasty suspicious for loosening or infection      2.  No other finding      US-EXTREMITY VENOUS UPPER UNILAT LEFT   Final Result      US-RENAL   Final Result      1.  Unremarkable kidneys.   2.  Limited evaluation of the bladder.      CT-ABDOMEN-PELVIS W/O   Final Result         Limited exam due to lack of IV contrast.         1. Mild diffuse wall thickening of the urinary bladder could relate to infection or inflammation. Correlate with UA. No hydronephrosis.   2. Nonspecific mildly prominent fluid-filled proximal small bowel in the left abdomen. This could be seen with enteritis, focal ileus or early obstruction.   3. Cirrhotic liver with portal hypertension and splenomegaly. Wall thickening of the ascending colon could relate to portal hypertension.   4. No ascites.      DX-SHOULDER 2+ LEFT   Final Result      1.  No evidence of acute fracture or dislocation.      2.  Old posttraumatic changes of the distal clavicle.       DX-CHEST-PORTABLE (1 VIEW)   Final Result      Cardiomegaly.      DX-HAND 2- RIGHT    (Results Pending)        Assessment/Plan  * Bacteremia- (present on admission)  Assessment & Plan  2 bottles with group B strep   Patient reported history of severe bacteremia a year ago after knee surgery last year, which required skin grafting of both lower extremity and was hospitalized at [place] when she visited her son there  Repeat blood cultures NTD    Cont Rocephin  Check TTE negative for vegetation  ID rec cont daptomycin daily end 8/30/2022 and then follow up in ID clinic at the end of antibiotics course. Need potential aspiration of the knee to ensure no infection prior to replacing the hardware.       Coagulopathy (HCC)  Assessment & Plan  INR 2.25.  H/o alcoholic cirrhosis of liver and Hepatitis C  7/28 dc'd eliquis for afib since requiring blood transfusion for Hg 6.8.    Hepatitis C infection  Assessment & Plan  Patient states she has been treated in [place] previously.      Drug-induced pancytopenia (HCC)  Assessment & Plan  Secondary to Rocephin.  7/26:  Changed rocephin to daptomycin IV.  Check daily CBC.  CPK and CBC weekly while on daptomycin.    Septic arthritis of knee, left (HCC)  Assessment & Plan  History of left knee replacement 2017, which was complicated with infection 2 weeks later  Patient had recurrent left knee infection last year which was complicated with bacteremia and she was hospitalized at the [place] hospital when she visited her son     Xray notes New lucency adjacent to the tibial component of arthroplasty suspicious for loosening or infection.   Orthopedics consulted, s/p L knee arthrocentesis 7/16 c/w infection.   s/p L knee resection and antibiotic spacer 7/19  ID rec cont daptomycin IV daily end 8/30/2022 and then follow up in ID clinic at the end of antibiotics course. Need potential aspiration of the knee to ensure no infection prior to replacing the hardware.   TTWB LLE per  ortho.    Septic arthritis of shoulder, left (HCC)- (present on admission)  Assessment & Plan  MRI left shoulder showed septic arthritis/subdeltoid abscess    Ortho consulted,  s/p Left shoulder arthrotomy and lavage for infection with partial synovectomy. Left shoulder incision and drainage of the subdeltoid abscess 7/17.  SONY drain out 7/27.  fluid culture NTD  On daptomycin until 8/30.    Iron deficiency anemia  Assessment & Plan  Acute on chronic  Iron study consistent with iron deficiency  Start po iron supplements  Check FOBT given remote hx of gastric ulcer and abdominal pain. On Eliquis. On PPI.  Per bedside RN, stool does not appear melanotic  Likely 2/2 to Rocephin given pancytopenia.    Hyponatremia- (present on admission)  Assessment & Plan  123 on admission  High SG on UA: probable hypovolemic and alcohol use  Cont to monitor    Resolved    Acute cystitis without hematuria  Assessment & Plan  On Rocephin/now daptomycin for bacteremia    Metabolic encephalopathy- (present on admission)  Assessment & Plan  Likely multifactorial in conjunction with narcotic use  S/p Narcan treatment, improved  Start lacutlose  Cont to treat infection     resolved      Hyperlipidemia- (present on admission)  Assessment & Plan  Continue Lipitor    ELIZABETH (acute kidney injury) (HCC)- (present on admission)  Assessment & Plan  Likely secondary dehydration  4.7 on admission   Good UOP  DC IVFs  Cont to monitor  Renally dose medications as appropriate    Resolved    Atrial fibrillation (HCC)- (present on admission)  Assessment & Plan  On apixaban and dronedarone as outpt  7/28:  dc'd eliquis since INR 2.25 from cirrhosis liver and transfusion needed for Hg 6.8.  Rate controlled     Cirrhosis (HCC)- (present on admission)  Assessment & Plan  Secondary to ETOH  Lactulose  monitor    Hypertension- (present on admission)  Assessment & Plan  Resume the home losartan    Controlled type 2 diabetes mellitus with hyperglycemia, without  long-term current use of insulin (HCC)- (present on admission)  Assessment & Plan  Sliding-scale insulin, diabetic diet  A1c 5.7%         VTE prophylaxis: SCDs/TEDs and therapeutic anticoagulation with Eliquis (on hold)    I have performed a physical exam and reviewed and updated ROS and Plan today (7/28/2022). In review of yesterday's note (7/27/2022), there are no changes except as documented above.

## 2022-07-28 NOTE — CARE PLAN
The patient is Stable - Low risk of patient condition declining or worsening    Shift Goals  Clinical Goals: increase mobility, safety, pain management  Patient Goals: rest  Family Goals: EDMUND    Progress made toward(s) clinical / shift goals:    Problem: Pain - Standard  Goal: Alleviation of pain or a reduction in pain to the patient’s comfort goal  Outcome: Progressing  Note: PRN pain medications in use for pain control.       Problem: Hemodynamics  Goal: Patient's hemodynamics, fluid balance and neurologic status will be stable or improve  Outcome: Progressing  Note: VSS, UOP adequate       Patient is not progressing towards the following goals:

## 2022-07-28 NOTE — CARE PLAN
The patient is Stable - Low risk of patient condition declining or worsening    Shift Goals  Clinical Goals: Continue supportive measures for wound healing and pain control.  Patient Goals: Rest and heal.  Family Goals: EDMUND    Progress made toward(s) clinical / shift goals:  Wound vac off left leg, now cleansed with aqualcel ag dressing applied; staples intact; approximated and without drainage.  Receiving prn oxycodone and lidocaine applied to left shoulder; continues to report pain level 9-10/10, but is able to fall asleep intermittently during the day.    Patient is not progressing towards the following goals: Awaiting for appropriate discharge arrangements to be secured.      Problem: Self Care  Goal: Patient will have the ability to perform ADLs independently or with assistance (bathe, groom, dress, toilet and feed)  Outcome: Not Progressing      April requires assistance with ADLs; mobility impaired due to limitations to LLE weight bearing, and due to pain.    Problem: Pain - Standard  Goal: Alleviation of pain or a reduction in pain to the patient’s comfort goal  Outcome: Progressing   Receiving prn oxycodone and lidocaine applied to left shoulder; continues to report pain level 9-10/10, but is able to fall asleep intermittently during the day.    Problem: Skin Integrity  Goal: Skin integrity is maintained or improved  Outcome: Progressing   Wound vac off left leg, now cleansed with aqualcel ag dressing applied; staples intact; approximated and without drainage.  Left shoulder dressing intact, and no drainage.    Problem: Fall Risk  Goal: Patient will remain free from falls  Outcome: Progressing    No falls or injuries. Precautions engaged.    Problem: Nutrition  Goal: Patient's nutritional and fluid intake will be adequate or improve  Outcome: Progressing   Good appetite.    Problem: Mobility  Goal: Patient's capacity to carry out activities will improve  Outcome: Progressing   Mobility impaired due to  ordered toe-touch weight bearing to LLE, and due to pain.    Problem: Wound/ / Incision Healing  Goal: Patient's wound/surgical incision will decrease in size and heals properly  Outcome: Progressing

## 2022-07-28 NOTE — WOUND TEAM
Wound team consulted for replacement of prevena.  Patient chart reviewed, patient has surgery on 7/19 with Dr. Luz.  Ulises DIAMOND following patient, reached out to him regarding patient.  Ulises reached out to Dr. Luz and followed up with this wound care RN.  Dr. Luz would like a Silver island dressing.  Silver island dressing was given to bedside RN to place.  Wound consult completed at this time.

## 2022-07-28 NOTE — PROGRESS NOTES
Bedside report received and patient care assumed. Pt is resting in bed, A&Ox4, with complaints of pain to left shoulder and leg, and is on RA.  All fall precautions are in place, belongings at bedside table.  Pt was updated on POC, no questions or concerns. Pt educated on use of call light for assistance.

## 2022-07-28 NOTE — PROGRESS NOTES
This RN noticed prevena was not on or suctioning correctly. Attempted to change batteries, although prevena unsuccessful to turn back on. Prevena and dressing taken off, and left bedside. Wet to dry dressing applied (NS soaked in dry gauze, dry roll gauze over). Site clean, dry, intact, pink, staples intact. Old scant purulent drainage on prevena dressing.

## 2022-07-28 NOTE — PROGRESS NOTES
"   Orthopaedic Progress Note    Interval changes:  Patient doing well  Vac converted to mepilex ag dressing   LUE dressings CDI     ROS - Patient denies any new issues.  Pain well controlled.    /66   Pulse 78   Temp 37.7 °C (99.9 °F) (Temporal)   Resp 18   Ht 1.715 m (5' 7.5\")   Wt 95.1 kg (209 lb 10.5 oz)   SpO2 97%       Patient seen and examined  No acute distress  Breathing non labored  RRR  LUE dressing CDI, DNVI, moves all fingers, cap refill <2 sec.  LLE dressing CDI, DNVI, moves all toes, cap refill <2 sec.     Recent Labs     07/26/22  0315 07/27/22  0207 07/28/22  0303   WBC 3.9* 4.3* 3.8*   RBC 2.47* 2.61* 2.41*   HEMOGLOBIN 7.0* 7.3* 6.8*   HEMATOCRIT 21.5* 22.3* 20.7*   MCV 87.0 85.4 85.9   MCH 28.3 28.0 28.2   MCHC 32.6* 32.7* 32.9*   RDW 48.0 46.7 46.5   PLATELETCT 128* 122* 105*   MPV 9.8 9.3 10.1       Active Hospital Problems    Diagnosis    • Hypertension [I10]      Priority: Medium   • Controlled type 2 diabetes mellitus with hyperglycemia, without long-term current use of insulin ([facility]) [E11.65]      Priority: Medium   • Cirrhosis ([facility]) [K74.60]      Priority: Low   • Drug-induced pancytopenia ([facility]) [D61.811]    • Septic arthritis of knee, left ([facility]) [M00.9]    • Septic arthritis of shoulder, left ([facility]) [M00.9]    • Iron deficiency anemia [D50.9]    • Acute cystitis without hematuria [N30.00]    • Bacteremia [R78.81]    • Hyponatremia [E87.1]    • ELIZABETH (acute kidney injury) ([facility]) [N17.9]    • Hyperlipidemia [E78.5]    • Metabolic encephalopathy [G93.41]    • Atrial fibrillation ([facility]) [I48.91]        Assessment/Plan:  Patient doing well  POD#9 S/P  1.  left total knee arthroplasty explantation  2. left knee insertion of articulating spacer   3. left knee application of incisional wound vacuum   POD#11 S/P   1.  Left shoulder arthrotomy and lavage for infection with partial synovectomy.  2.  Left shoulder incision and drainage of the subdeltoid abscess.  Wt bearing status - WBAT  Wound " care/Drains - Dressings to be left in place  Future Procedures - none planned   Sutures/Staples out- 14 days post operatively  PT/OT-initiated  Antibiotics: dapto 570mg IV qd  DVT Prophylaxis- TEDS/SCDs/Foot pumps  Amaro-none  Case Coordination for Discharge Planning - Disposition pending abx needs

## 2022-07-29 PROBLEM — M15.4 EROSIVE OSTEOARTHRITIS OF RIGHT HAND: Status: ACTIVE | Noted: 2022-07-29

## 2022-07-29 LAB
25(OH)D3 SERPL-MCNC: 9 NG/ML (ref 30–100)
BASOPHILS # BLD AUTO: 1 % (ref 0–1.8)
BASOPHILS # BLD: 0.04 K/UL (ref 0–0.12)
EOSINOPHIL # BLD AUTO: 0.19 K/UL (ref 0–0.51)
EOSINOPHIL NFR BLD: 4.9 % (ref 0–6.9)
ERYTHROCYTE [DISTWIDTH] IN BLOOD BY AUTOMATED COUNT: 47 FL (ref 35.9–50)
GLUCOSE BLD STRIP.AUTO-MCNC: 148 MG/DL (ref 65–99)
HAV IGM SERPL QL IA: ABNORMAL
HBV CORE IGM SER QL: ABNORMAL
HBV SURFACE AG SER QL: ABNORMAL
HCT VFR BLD AUTO: 22.8 % (ref 37–47)
HCV AB SER QL: REACTIVE
HGB BLD-MCNC: 7.5 G/DL (ref 12–16)
IMM GRANULOCYTES # BLD AUTO: 0.03 K/UL (ref 0–0.11)
IMM GRANULOCYTES NFR BLD AUTO: 0.8 % (ref 0–0.9)
LYMPHOCYTES # BLD AUTO: 1.07 K/UL (ref 1–4.8)
LYMPHOCYTES NFR BLD: 27.9 % (ref 22–41)
MCH RBC QN AUTO: 28.2 PG (ref 27–33)
MCHC RBC AUTO-ENTMCNC: 32.9 G/DL (ref 33.6–35)
MCV RBC AUTO: 85.7 FL (ref 81.4–97.8)
MONOCYTES # BLD AUTO: 0.47 K/UL (ref 0–0.85)
MONOCYTES NFR BLD AUTO: 12.2 % (ref 0–13.4)
NEUTROPHILS # BLD AUTO: 2.04 K/UL (ref 2–7.15)
NEUTROPHILS NFR BLD: 53.2 % (ref 44–72)
NRBC # BLD AUTO: 0 K/UL
NRBC BLD-RTO: 0 /100 WBC
PLATELET # BLD AUTO: 103 K/UL (ref 164–446)
PMV BLD AUTO: 9.8 FL (ref 9–12.9)
RBC # BLD AUTO: 2.66 M/UL (ref 4.2–5.4)
WBC # BLD AUTO: 3.8 K/UL (ref 4.8–10.8)

## 2022-07-29 PROCEDURE — 700101 HCHG RX REV CODE 250

## 2022-07-29 PROCEDURE — 82962 GLUCOSE BLOOD TEST: CPT | Mod: 91

## 2022-07-29 PROCEDURE — 700111 HCHG RX REV CODE 636 W/ 250 OVERRIDE (IP): Performed by: HOSPITALIST

## 2022-07-29 PROCEDURE — 770001 HCHG ROOM/CARE - MED/SURG/GYN PRIV*

## 2022-07-29 PROCEDURE — A9270 NON-COVERED ITEM OR SERVICE: HCPCS | Performed by: STUDENT IN AN ORGANIZED HEALTH CARE EDUCATION/TRAINING PROGRAM

## 2022-07-29 PROCEDURE — 99232 SBSQ HOSP IP/OBS MODERATE 35: CPT | Performed by: HOSPITALIST

## 2022-07-29 PROCEDURE — 700102 HCHG RX REV CODE 250 W/ 637 OVERRIDE(OP): Performed by: HOSPITALIST

## 2022-07-29 PROCEDURE — A9270 NON-COVERED ITEM OR SERVICE: HCPCS | Performed by: HOSPITALIST

## 2022-07-29 PROCEDURE — 700102 HCHG RX REV CODE 250 W/ 637 OVERRIDE(OP): Performed by: STUDENT IN AN ORGANIZED HEALTH CARE EDUCATION/TRAINING PROGRAM

## 2022-07-29 PROCEDURE — 87522 HEPATITIS C REVRS TRNSCRPJ: CPT

## 2022-07-29 PROCEDURE — A9270 NON-COVERED ITEM OR SERVICE: HCPCS

## 2022-07-29 PROCEDURE — 80074 ACUTE HEPATITIS PANEL: CPT

## 2022-07-29 PROCEDURE — 85025 COMPLETE CBC W/AUTO DIFF WBC: CPT

## 2022-07-29 PROCEDURE — 700105 HCHG RX REV CODE 258: Performed by: HOSPITALIST

## 2022-07-29 PROCEDURE — 700102 HCHG RX REV CODE 250 W/ 637 OVERRIDE(OP)

## 2022-07-29 RX ADMIN — INSULIN HUMAN 3 UNITS: 100 INJECTION, SOLUTION PARENTERAL at 21:32

## 2022-07-29 RX ADMIN — CALCIUM CARBONATE 500 MG: 500 TABLET, CHEWABLE ORAL at 12:14

## 2022-07-29 RX ADMIN — FERROUS SULFATE TAB 325 MG (65 MG ELEMENTAL FE) 325 MG: 325 (65 FE) TAB at 08:25

## 2022-07-29 RX ADMIN — CALCIUM CARBONATE 500 MG: 500 TABLET, CHEWABLE ORAL at 17:31

## 2022-07-29 RX ADMIN — DAPTOMYCIN 570 MG: 500 INJECTION, POWDER, LYOPHILIZED, FOR SOLUTION INTRAVENOUS at 06:03

## 2022-07-29 RX ADMIN — CALCIUM CARBONATE 500 MG: 500 TABLET, CHEWABLE ORAL at 17:27

## 2022-07-29 RX ADMIN — LOSARTAN POTASSIUM 25 MG: 50 TABLET, FILM COATED ORAL at 06:04

## 2022-07-29 RX ADMIN — DRONEDARONE 400 MG: 400 TABLET, FILM COATED ORAL at 08:29

## 2022-07-29 RX ADMIN — OXYCODONE HYDROCHLORIDE 10 MG: 10 TABLET ORAL at 21:30

## 2022-07-29 RX ADMIN — OXYCODONE HYDROCHLORIDE 10 MG: 10 TABLET ORAL at 06:05

## 2022-07-29 RX ADMIN — ACETAMINOPHEN 650 MG: 325 TABLET, FILM COATED ORAL at 03:21

## 2022-07-29 RX ADMIN — LIDOCAINE 1 PATCH: 700 PATCH TOPICAL at 14:37

## 2022-07-29 RX ADMIN — ATORVASTATIN CALCIUM 20 MG: 20 TABLET, FILM COATED ORAL at 21:30

## 2022-07-29 RX ADMIN — OMEPRAZOLE 20 MG: 20 CAPSULE, DELAYED RELEASE ORAL at 17:27

## 2022-07-29 RX ADMIN — DRONEDARONE 400 MG: 400 TABLET, FILM COATED ORAL at 17:31

## 2022-07-29 RX ADMIN — LACTULOSE 15 ML: 20 SOLUTION ORAL at 06:03

## 2022-07-29 RX ADMIN — ACETAMINOPHEN 650 MG: 325 TABLET, FILM COATED ORAL at 12:14

## 2022-07-29 RX ADMIN — OMEPRAZOLE 20 MG: 20 CAPSULE, DELAYED RELEASE ORAL at 06:05

## 2022-07-29 RX ADMIN — OXYCODONE HYDROCHLORIDE 10 MG: 10 TABLET ORAL at 14:37

## 2022-07-29 ASSESSMENT — ENCOUNTER SYMPTOMS
LOSS OF CONSCIOUSNESS: 0
HEADACHES: 0
CHILLS: 0
ABDOMINAL PAIN: 1
COUGH: 0
DIARRHEA: 0
MYALGIAS: 1
DOUBLE VISION: 0
PALPITATIONS: 0
BACK PAIN: 1
SHORTNESS OF BREATH: 0
VOMITING: 0
SORE THROAT: 0
DIZZINESS: 0
NAUSEA: 0
BLURRED VISION: 0
FEVER: 0

## 2022-07-29 ASSESSMENT — PAIN DESCRIPTION - PAIN TYPE
TYPE: ACUTE PAIN

## 2022-07-29 ASSESSMENT — PAIN SCALES - WONG BAKER
WONGBAKER_NUMERICALRESPONSE: HURTS JUST A LITTLE BIT
WONGBAKER_NUMERICALRESPONSE: HURTS JUST A LITTLE BIT

## 2022-07-29 NOTE — PROGRESS NOTES
Bedside report received and patient care assumed. Pt is resting in bed, A&Ox4, with complaints of pain, and is on RA. Encouraging independence. All fall precautions are in place, belongings at bedside table.  Pt was updated on POC, no questions or concerns. Pt educated on use of call light for assistance.

## 2022-07-29 NOTE — CARE PLAN
The patient is Stable - Low risk of patient condition declining or worsening    Shift Goals  Clinical Goals: Monitor hgb, pain management, encourage independence  Patient Goals: rest  Family Goals: EDMUND    Progress made toward(s) clinical / shift goals:    Problem: Pain - Standard  Goal: Alleviation of pain or a reduction in pain to the patient’s comfort goal  Outcome: Progressing  Note: PRN pain medications in use for pain control.       Problem: Wound/ / Incision Healing  Goal: Patient's wound/surgical incision will decrease in size and heals properly  Outcome: Progressing  Note: Incision site clean, dry, intact with staples and dressing       Patient is not progressing towards the following goals:

## 2022-07-29 NOTE — PROGRESS NOTES
62yoF with multiple medical issues admitted recently with sepsis.  Has infected left TKA s/p abx spacer by Dr. Luz 7/19.  Also has left shoulder septic arthritis with associated abscess s/p I&D 7/17.      S: No complaints at the moment.    O:    Vitals:    07/29/22 0840   BP: 101/66   Pulse: 74   Resp: 18   Temp: 36.8 °C (98.3 °F)   SpO2: 98%     Exam:  General-NAD, following commands  LUE-shoulder dressing c/d/i, SONY drain out, mepilex dressing c/d/i, NVI distally, limited active shoulder ROM  LLE-knee dressing aquacel dressing c/d/i, brace off, NVI distally    A: 62yoF with multiple medical issues admitted recently with sepsis.  Has infected left TKA s/p abx spacer by Dr. Luz 7/19.  Also has left shoulder septic arthritis with associated abscess s/p I&D 7/17.      Recs:  --aquacel in place to left knee but prevena incisional vac would be preferable until sutures removed  --continue abx per ID recs   --PT for left shoulder ROM and mobilization  --TTWB LLE, WBAT LUE  --fu 2 weeks postop

## 2022-07-29 NOTE — CARE PLAN
The patient is Stable - Low risk of patient condition declining or worsening    Shift Goals  Clinical Goals: mon. hgb  Patient Goals: rest  Family Goals: gisele      Problem: Knowledge Deficit - Standard  Goal: Patient and family/care givers will demonstrate understanding of plan of care, disease process/condition, diagnostic tests and medications  Outcome: Progressing     Problem: Pain - Standard  Goal: Alleviation of pain or a reduction in pain to the patient’s comfort goal  Outcome: Progressing     Problem: Skin Integrity  Goal: Skin integrity is maintained or improved  Outcome: Progressing     Problem: Fall Risk  Goal: Patient will remain free from falls  Outcome: Progressing     Problem: Hemodynamics  Goal: Patient's hemodynamics, fluid balance and neurologic status will be stable or improve  Outcome: Progressing     Problem: Nutrition  Goal: Patient's nutritional and fluid intake will be adequate or improve  Outcome: Progressing     Problem: Mobility  Goal: Patient's capacity to carry out activities will improve  Outcome: Progressing     Problem: Self Care  Goal: Patient will have the ability to perform ADLs independently or with assistance (bathe, groom, dress, toilet and feed)  Outcome: Progressing     Problem: Wound/ / Incision Healing  Goal: Patient's wound/surgical incision will decrease in size and heals properly  Outcome: Progressing     Problem: Wound/ / Incision Healing  Goal: Patient's wound/surgical incision will decrease in size and heals properly  Outcome: Progressing     Problem: Nutrition  Goal: Patient's nutritional and fluid intake will be adequate or improve  Outcome: Progressing     Problem: Mobility  Goal: Patient's capacity to carry out activities will improve  Outcome: Progressing     Problem: Self Care  Goal: Patient will have the ability to perform ADLs independently or with assistance (bathe, groom, dress, toilet and feed)  Outcome: Progressing

## 2022-07-30 PROBLEM — E55.9 VITAMIN D DEFICIENCY: Status: ACTIVE | Noted: 2022-07-30

## 2022-07-30 LAB
BASOPHILS # BLD AUTO: 0.9 % (ref 0–1.8)
BASOPHILS # BLD: 0.04 K/UL (ref 0–0.12)
EOSINOPHIL # BLD AUTO: 0.22 K/UL (ref 0–0.51)
EOSINOPHIL NFR BLD: 5 % (ref 0–6.9)
ERYTHROCYTE [DISTWIDTH] IN BLOOD BY AUTOMATED COUNT: 46.7 FL (ref 35.9–50)
GLUCOSE BLD STRIP.AUTO-MCNC: 169 MG/DL (ref 65–99)
HCT VFR BLD AUTO: 22.9 % (ref 37–47)
HGB BLD-MCNC: 7.6 G/DL (ref 12–16)
IMM GRANULOCYTES # BLD AUTO: 0.04 K/UL (ref 0–0.11)
IMM GRANULOCYTES NFR BLD AUTO: 0.9 % (ref 0–0.9)
LYMPHOCYTES # BLD AUTO: 1.22 K/UL (ref 1–4.8)
LYMPHOCYTES NFR BLD: 27.5 % (ref 22–41)
MCH RBC QN AUTO: 28.4 PG (ref 27–33)
MCHC RBC AUTO-ENTMCNC: 33.2 G/DL (ref 33.6–35)
MCV RBC AUTO: 85.4 FL (ref 81.4–97.8)
MONOCYTES # BLD AUTO: 0.47 K/UL (ref 0–0.85)
MONOCYTES NFR BLD AUTO: 10.6 % (ref 0–13.4)
NEUTROPHILS # BLD AUTO: 2.44 K/UL (ref 2–7.15)
NEUTROPHILS NFR BLD: 55.1 % (ref 44–72)
NRBC # BLD AUTO: 0 K/UL
NRBC BLD-RTO: 0 /100 WBC
PLATELET # BLD AUTO: 130 K/UL (ref 164–446)
PMV BLD AUTO: 10 FL (ref 9–12.9)
RBC # BLD AUTO: 2.68 M/UL (ref 4.2–5.4)
WBC # BLD AUTO: 4.4 K/UL (ref 4.8–10.8)

## 2022-07-30 PROCEDURE — A9270 NON-COVERED ITEM OR SERVICE: HCPCS | Performed by: HOSPITALIST

## 2022-07-30 PROCEDURE — 85025 COMPLETE CBC W/AUTO DIFF WBC: CPT

## 2022-07-30 PROCEDURE — A9270 NON-COVERED ITEM OR SERVICE: HCPCS | Performed by: STUDENT IN AN ORGANIZED HEALTH CARE EDUCATION/TRAINING PROGRAM

## 2022-07-30 PROCEDURE — A9270 NON-COVERED ITEM OR SERVICE: HCPCS

## 2022-07-30 PROCEDURE — 82962 GLUCOSE BLOOD TEST: CPT | Mod: 91

## 2022-07-30 PROCEDURE — 700111 HCHG RX REV CODE 636 W/ 250 OVERRIDE (IP): Performed by: HOSPITALIST

## 2022-07-30 PROCEDURE — 700102 HCHG RX REV CODE 250 W/ 637 OVERRIDE(OP): Performed by: STUDENT IN AN ORGANIZED HEALTH CARE EDUCATION/TRAINING PROGRAM

## 2022-07-30 PROCEDURE — 700102 HCHG RX REV CODE 250 W/ 637 OVERRIDE(OP): Performed by: HOSPITALIST

## 2022-07-30 PROCEDURE — 99232 SBSQ HOSP IP/OBS MODERATE 35: CPT | Performed by: HOSPITALIST

## 2022-07-30 PROCEDURE — 770001 HCHG ROOM/CARE - MED/SURG/GYN PRIV*

## 2022-07-30 PROCEDURE — 700105 HCHG RX REV CODE 258: Performed by: HOSPITALIST

## 2022-07-30 PROCEDURE — 700101 HCHG RX REV CODE 250

## 2022-07-30 PROCEDURE — 700102 HCHG RX REV CODE 250 W/ 637 OVERRIDE(OP)

## 2022-07-30 RX ORDER — VITAMIN B COMPLEX
4000 TABLET ORAL DAILY
Status: DISCONTINUED | OUTPATIENT
Start: 2022-07-30 | End: 2022-08-02

## 2022-07-30 RX ADMIN — Medication 4000 UNITS: at 17:28

## 2022-07-30 RX ADMIN — FERROUS SULFATE TAB 325 MG (65 MG ELEMENTAL FE) 325 MG: 325 (65 FE) TAB at 08:14

## 2022-07-30 RX ADMIN — OXYCODONE HYDROCHLORIDE 10 MG: 10 TABLET ORAL at 04:05

## 2022-07-30 RX ADMIN — DRONEDARONE 400 MG: 400 TABLET, FILM COATED ORAL at 08:15

## 2022-07-30 RX ADMIN — INSULIN HUMAN 3 UNITS: 100 INJECTION, SOLUTION PARENTERAL at 20:51

## 2022-07-30 RX ADMIN — ACETAMINOPHEN 650 MG: 325 TABLET, FILM COATED ORAL at 01:35

## 2022-07-30 RX ADMIN — INSULIN HUMAN 3 UNITS: 100 INJECTION, SOLUTION PARENTERAL at 17:25

## 2022-07-30 RX ADMIN — DAPTOMYCIN 570 MG: 500 INJECTION, POWDER, LYOPHILIZED, FOR SOLUTION INTRAVENOUS at 04:30

## 2022-07-30 RX ADMIN — CALCIUM CARBONATE 500 MG: 500 TABLET, CHEWABLE ORAL at 01:34

## 2022-07-30 RX ADMIN — ATORVASTATIN CALCIUM 20 MG: 20 TABLET, FILM COATED ORAL at 20:50

## 2022-07-30 RX ADMIN — DRONEDARONE 400 MG: 400 TABLET, FILM COATED ORAL at 17:25

## 2022-07-30 RX ADMIN — LACTULOSE 15 ML: 20 SOLUTION ORAL at 04:06

## 2022-07-30 RX ADMIN — OXYCODONE HYDROCHLORIDE 10 MG: 10 TABLET ORAL at 22:04

## 2022-07-30 RX ADMIN — OMEPRAZOLE 20 MG: 20 CAPSULE, DELAYED RELEASE ORAL at 04:06

## 2022-07-30 RX ADMIN — CALCIUM CARBONATE 500 MG: 500 TABLET, CHEWABLE ORAL at 17:31

## 2022-07-30 RX ADMIN — ACETAMINOPHEN 650 MG: 325 TABLET, FILM COATED ORAL at 20:51

## 2022-07-30 RX ADMIN — OMEPRAZOLE 20 MG: 20 CAPSULE, DELAYED RELEASE ORAL at 17:28

## 2022-07-30 RX ADMIN — OXYCODONE HYDROCHLORIDE 10 MG: 10 TABLET ORAL at 16:03

## 2022-07-30 RX ADMIN — LIDOCAINE 1 PATCH: 700 PATCH TOPICAL at 15:00

## 2022-07-30 ASSESSMENT — PAIN SCALES - PAIN ASSESSMENT IN ADVANCED DEMENTIA (PAINAD)
CONSOLABILITY: NO NEED TO CONSOLE
BODYLANGUAGE: TENSE, DISTRESSED PACING, FIDGETING
FACIALEXPRESSION: SAD, FRIGHTENED, FROWN

## 2022-07-30 ASSESSMENT — ENCOUNTER SYMPTOMS
FEVER: 0
BACK PAIN: 1
DIZZINESS: 0
DIARRHEA: 0
NAUSEA: 0
CHILLS: 0
ABDOMINAL PAIN: 1
SHORTNESS OF BREATH: 0
BLURRED VISION: 0
DOUBLE VISION: 0
LOSS OF CONSCIOUSNESS: 0
PALPITATIONS: 0
COUGH: 0
MYALGIAS: 1
SORE THROAT: 0
VOMITING: 0
HEADACHES: 0

## 2022-07-30 ASSESSMENT — PAIN DESCRIPTION - PAIN TYPE
TYPE: ACUTE PAIN

## 2022-07-30 NOTE — PROGRESS NOTES
Bedside report received and patient care assumed. Pt is resting in bed, A&Ox4, with complaints of pain, and is on RA. Encouraging ROM and getting edge of bed. All fall precautions are in place, belongings at bedside table.  Pt was updated on POC, no questions or concerns. Pt educated on use of call light for assistance.

## 2022-07-30 NOTE — PROGRESS NOTES
Hospital Medicine Daily Progress Note    Date of Service  7/30/2022    Chief Complaint  April Alicia is a 62 y.o. female admitted 7/10/2022 with hypotension, AMS    Hospital Course  61yo PMHx coccaine and ETOH abuse though sober x 8 years per her report, cirrhosis, asthma, AFib on apixaban, DM, HTN. Took an unknown narcotic.  In ED responded to narcan, labs showing Na 121, Cl 89, Creat 5.35, CXR showing cardiomegally.  Admitted to the floor with AMS, HypoNa, dehydration, sepsis from urinary source.  Subsequent blood cultures + Strep Species    Bacteremia with group B streptococcal bacteremia. ID consulted. -on Rocephin, repeated blood culture NTD. TTE showed  EF of 60%, negative for vegetation.     ELIZABETH-resolved    Hx of L knee arthroplasty, left knee pain and swelling. Xray notes New lucency adjacent to the tibial component of arthroplasty suspicious for loosening or infection. Orthopedics consulted, s/p L knee arthrocentesis 7/16 c/w infection. s/p L knee revision, total arthroplasty with antibiotic spacer and wound vac 7/19.     MRI left shoulder showed possible septic arthritis/bursitis. s/p Left shoulder arthrotomy and lavage for infection with partial   Synovectomy. Left shoulder incision and drainage of the subdeltoid abscess 7/17.    ID rec cont ceftriaxone 2gm daily end 8/30/2022 and then follow up in ID clinic at the end of antibiotics course. Need potential aspiration of the knee to ensure no infection prior to replacing the hardware.     Interval Problem Update  7/26: Patient seen sleeping soundly.  Hemovac dc'd.  She did have a temp of 38.1 at 7/25.  She is also noted to have pancytopenia while on Rocephin.  ID contact infectious disease who did agree and switch to daptomycin to continue through 8/30/2022.  Patient is not medically cleared until stabilization of her hemoglobin.  CBC daily and CPK weekly ordered while on daptomycin.  7/27: Patient feeling improved.  She is still complaining of  gastric pain.  She has been on Protonix IV twice daily I did switch it to Prilosec twice daily with Tums.  Her hemoglobin has increased from 7-7.3 after switching from Rocephin to daptomycin yesterday.  No obvious source of bleeding.  I have added a vitamin B12 level.  Ferritin was 213 and normal.  SONY drain fell out of left shoulder but otherwise shoulder incision appears clean dry and intact.  Left knee Prevena in place.  7/28: Patient states she was treated for hepatitis C in [place] previously.  She was a previous heavy alcohol user as well.  She has been clean and sober for over a year.  Blood hemoglobin dropped to 6.8 this AM.  Status post 1 unit packed red blood cells.  INR 2.25 from liver disease.  I have ordered repeat hemoglobin posttransfusion and discontinued Eliquis since she is only taking it for atrial fibrillation.  7/29: Hemoglobin stabilizing off Eliquis.  Holding anticoagulation until hemoglobin stabilizes.  Currently at 7.5.  Patient has pancytopenia from hepatitis C and cirrhosis of the liver.  She is hypotensive I have discontinued her Cozaar 25 mg daily.  X-ray reviewed left hand showing erosive osteoarthritis only no new fracture.  Check vitamin D level.  7/30: Hemoglobin stable at 7.6.  Off Eliquis.  Right hand still painful.  Checked vitamin D level low at 9 started 4000 units D3 daily.    I have discussed this patient's plan of care and discharge plan at IDT rounds today with Case Management, Nursing, Nursing leadership, and other members of the IDT team.    Consultants/Specialty  ID  ortho    Code Status  Full Code    Disposition  Patient is medically cleared for discharge.    Changed Rocephin to daptomycin.  Occult blood stool negative.  Anticipate discharge to to skilled nursing facility.  Needs [facility]. [facility] LLE.  I have placed the appropriate orders for post-discharge needs.    Review of Systems  Review of Systems   Constitutional: Positive for malaise/fatigue. Negative for chills  and fever.   HENT: Negative for nosebleeds and sore throat.    Eyes: Negative for blurred vision and double vision.   Respiratory: Negative for cough and shortness of breath.    Cardiovascular: Negative for chest pain, palpitations and leg swelling.   Gastrointestinal: Positive for abdominal pain (epigastric). Negative for diarrhea, nausea and vomiting.   Genitourinary: Negative for dysuria.   Musculoskeletal: Positive for back pain, joint pain and myalgias.        L arm pain; left knee pain and swelling    Skin: Negative for rash.   Neurological: Negative for dizziness, loss of consciousness and headaches.        Physical Exam  Temp:  [36.4 °C (97.6 °F)-37 °C (98.6 °F)] 36.9 °C (98.4 °F)  Pulse:  [66-82] 70  Resp:  [18-20] 18  BP: ()/(57-69) 104/62  SpO2:  [95 %-98 %] 96 %    Physical Exam  Vitals reviewed.   Constitutional:       General: She is not in acute distress.     Appearance: She is well-developed. She is ill-appearing. She is not diaphoretic.   HENT:      Head: Normocephalic and atraumatic.      Mouth/Throat:      Mouth: Mucous membranes are moist.   Eyes:      Extraocular Movements: Extraocular movements intact.      Conjunctiva/sclera: Conjunctivae normal.   Neck:      Vascular: No JVD.   Cardiovascular:      Rate and Rhythm: Normal rate and regular rhythm.      Pulses: Normal pulses.   Pulmonary:      Effort: Pulmonary effort is normal. No respiratory distress.      Breath sounds: No stridor. No wheezing or rales.   Abdominal:      Palpations: Abdomen is soft.      Tenderness: There is no abdominal tenderness. There is no guarding or rebound.   Musculoskeletal:         General: Swelling and tenderness present.      Cervical back: Neck supple. No rigidity or tenderness.      Right lower leg: No edema.      Left lower leg: No edema.      Comments: Left knee swelling, tenderness, status post surgery, on dressing, clean  Left shoulder clean dressing, SONY drain in place     Skin:     General: Skin is  warm and dry.   Neurological:      Mental Status: She is alert and oriented to person, place, and time. Mental status is at baseline.   Psychiatric:         Mood and Affect: Mood normal.         Behavior: Behavior normal.         Thought Content: Thought content normal.         Fluids    Intake/Output Summary (Last 24 hours) at 7/30/2022 1348  Last data filed at 7/30/2022 0800  Gross per 24 hour   Intake 490 ml   Output --   Net 490 ml       Laboratory  Recent Labs     07/28/22  0303 07/28/22  2108 07/29/22  0316 07/30/22  0211   WBC 3.8*  --  3.8* 4.4*   RBC 2.41*  --  2.66* 2.68*   HEMOGLOBIN 6.8* 7.9* 7.5* 7.6*   HEMATOCRIT 20.7*  --  22.8* 22.9*   MCV 85.9  --  85.7 85.4   MCH 28.2  --  28.2 28.4   MCHC 32.9*  --  32.9* 33.2*   RDW 46.5  --  47.0 46.7   PLATELETCT 105*  --  103* 130*   MPV 10.1  --  9.8 10.0         Recent Labs     07/28/22  0654   INR 2.25*               Imaging  DX-HAND 2- RIGHT   Final Result      1. Interval development of osteopenia.   2. Marked erosive changes involving the right wrist and right fingers, most compatible with erosive osteoarthritis. Involvement is greatest involving the right first carpometacarpal joint.      US-EXTREMITY VENOUS UPPER UNILAT LEFT   Final Result      IR-PICC LINE PLACEMENT W/ GUIDANCE > AGE 5   Final Result                  Ultrasound-guided PICC placement performed by qualified nursing staff as    above.          DX-CHEST-FOR LINE PLACEMENT Perform procedure in: Patient's Room   Final Result      1.  Peripherally inserted catheter has been placed and the tip projects appropriately over the superior vena cava.   2.  Stable enlargement of the cardiomediastinal silhouette.   3.  Stable patchy bilateral interstitial opacities.      DX-KNEE 2- LEFT   Final Result      Status post removal of the tibial component of the left knee arthroplasty      MR-SHOULDER-W/O LEFT   Final Result      1.  Large joint effusion with a large amount of fluid seen extending into the  subacromial/subdeltoid bursa with synovitis and small low signal foci within the bursa consistent with pockets of gas. These findings are very suspicious for septic arthritis    and septic bursitis.      2.  Complete full-thickness tear of the supraspinatus tendon with retraction to the bony glenoid.      3.  Full-thickness tear portion of the subscapularis tendon.      4.  Partial-thickness tear of the articular surface fibers of the infraspinatus tendon.      5.  Prominent muscle edema and atrophy involving the rotator cuff as well as all the muscles surrounding the shoulder which may represent myositis.      6.  Osteoarthritis of the glenohumeral joint.      7.  Diffuse tearing of the glenoid labrum.      8.  Subluxation of the long head of the biceps tendon medially from the bicipital groove with marked thinning of the intra-articular portion consistent with tendinosis.      9.  Extensive soft tissue edema to include fat and muscle involving all of the imaged structures around the shoulder likely representing cellulitis.      10.  Widening of the acromioclavicular joint with fragmentation of the distal clavicle consistent with old posttraumatic change versus surgical change.      TI-AYMBFGG-1 VIEW   Final Result      Nonobstructive bowel gas pattern.      EC-ECHOCARDIOGRAM COMPLETE W/O CONT   Final Result      DX-CHEST-PORTABLE (1 VIEW)   Final Result      1.  Interstitial infiltrates versus edema.   2.  Cardiomegaly.      DX-KNEE 2- RIGHT   Final Result      1.  Negative for fracture or malalignment      2.  Right knee arthroplasty appears within normal limits      DX-KNEE 2- LEFT   Final Result      1.  New lucency adjacent to the tibial component of arthroplasty suspicious for loosening or infection      2.  No other finding      US-EXTREMITY VENOUS UPPER UNILAT LEFT   Final Result      US-RENAL   Final Result      1.  Unremarkable kidneys.   2.  Limited evaluation of the bladder.      CT-ABDOMEN-PELVIS W/O    Final Result         Limited exam due to lack of IV contrast.         1. Mild diffuse wall thickening of the urinary bladder could relate to infection or inflammation. Correlate with UA. No hydronephrosis.   2. Nonspecific mildly prominent fluid-filled proximal small bowel in the left abdomen. This could be seen with enteritis, focal ileus or early obstruction.   3. Cirrhotic liver with portal hypertension and splenomegaly. Wall thickening of the ascending colon could relate to portal hypertension.   4. No ascites.      DX-SHOULDER 2+ LEFT   Final Result      1.  No evidence of acute fracture or dislocation.      2.  Old posttraumatic changes of the distal clavicle.      DX-CHEST-PORTABLE (1 VIEW)   Final Result      Cardiomegaly.           Assessment/Plan  * Bacteremia- (present on admission)  Assessment & Plan  2 bottles with group B strep   Patient reported history of severe bacteremia a year ago after knee surgery last year, which required skin grafting of both lower extremity and was hospitalized at [place] when she visited her son there  Repeat blood cultures NTD    Cont Rocephin  Check TTE negative for vegetation  ID rec cont daptomycin daily end 8/30/2022 and then follow up in ID clinic at the end of antibiotics course. Need potential aspiration of the knee to ensure no infection prior to replacing the hardware.       Vitamin D deficiency  Assessment & Plan  Vitamin D level 9,  Started D3 4000 units daily.    Erosive osteoarthritis of right hand  Assessment & Plan  Noted on xray 7/28.  Check vitamin D level.    Coagulopathy (HCC)  Assessment & Plan  INR 2.25.  H/o alcoholic cirrhosis of liver and Hepatitis C  7/28 dc'd eliquis for afib since requiring blood transfusion for Hg 6.8.    Hepatitis C infection  Assessment & Plan  Patient states she has been treated in [place] previously.      Drug-induced pancytopenia (HCC)  Assessment & Plan  Secondary to Rocephin.  7/26:  Changed rocephin to daptomycin  IV.  Check daily CBC.  CPK and CBC weekly while on daptomycin.    Septic arthritis of knee, left (HCC)  Assessment & Plan  History of left knee replacement 2017, which was complicated with infection 2 weeks later  Patient had recurrent left knee infection last year which was complicated with bacteremia and she was hospitalized at the [place] hospital when she visited her son     Xray notes New lucency adjacent to the tibial component of arthroplasty suspicious for loosening or infection.   Orthopedics consulted, s/p L knee arthrocentesis 7/16 c/w infection.   s/p L knee resection and antibiotic spacer 7/19  ID rec cont daptomycin IV daily end 8/30/2022 and then follow up in ID clinic at the end of antibiotics course. Need potential aspiration of the knee to ensure no infection prior to replacing the hardware.   TTWB LLE per ortho.    Septic arthritis of shoulder, left (HCC)- (present on admission)  Assessment & Plan  MRI left shoulder showed septic arthritis/subdeltoid abscess    Ortho consulted,  s/p Left shoulder arthrotomy and lavage for infection with partial synovectomy. Left shoulder incision and drainage of the subdeltoid abscess 7/17.  SONY drain out 7/27.  fluid culture NTD  On daptomycin until 8/30.    Iron deficiency anemia  Assessment & Plan  Acute on chronic  Iron study consistent with iron deficiency  Start po iron supplements  Check FOBT given remote hx of gastric ulcer and abdominal pain. On Eliquis. On PPI.  Per bedside RN, stool does not appear melanotic  Likely 2/2 to Rocephin given pancytopenia.    Hyponatremia- (present on admission)  Assessment & Plan  123 on admission  High SG on UA: probable hypovolemic and alcohol use  Cont to monitor    Resolved    Acute cystitis without hematuria  Assessment & Plan  On Rocephin/now daptomycin for bacteremia    Metabolic encephalopathy- (present on admission)  Assessment & Plan  Likely multifactorial in conjunction with narcotic use  S/p Narcan treatment,  improved  Start lacutlose  Cont to treat infection     resolved      Hyperlipidemia- (present on admission)  Assessment & Plan  Continue Lipitor    ELIZABETH (acute kidney injury) (HCC)- (present on admission)  Assessment & Plan  Likely secondary dehydration  4.7 on admission   Good UOP  DC IVFs  Cont to monitor  Renally dose medications as appropriate    Resolved    Atrial fibrillation (HCC)- (present on admission)  Assessment & Plan  On apixaban and dronedarone as outpt  7/28:  dc'd eliquis since INR 2.25 from cirrhosis liver and transfusion needed for Hg 6.8.  Rate controlled     Cirrhosis (HCC)- (present on admission)  Assessment & Plan  Secondary to ETOH  Lactulose  monitor    Hypertension- (present on admission)  Assessment & Plan  Resume the home losartan    Controlled type 2 diabetes mellitus with hyperglycemia, without long-term current use of insulin (HCC)- (present on admission)  Assessment & Plan  Sliding-scale insulin, diabetic diet  A1c 5.7%         VTE prophylaxis: SCDs/TEDs and therapeutic anticoagulation with Eliquis (on hold)    I have performed a physical exam and reviewed and updated ROS and Plan today (7/30/2022). In review of yesterday's note (7/29/2022), there are no changes except as documented above.

## 2022-07-30 NOTE — CARE PLAN
The patient is Stable - Low risk of patient condition declining or worsening    Shift Goals  Clinical Goals: monitor hgb, increase ROM, pain management  Patient Goals: rest, pain management  Family Goals: EDMUND    Progress made toward(s) clinical / shift goals:    Problem: Knowledge Deficit - Standard  Goal: Patient and family/care givers will demonstrate understanding of plan of care, disease process/condition, diagnostic tests and medications  Outcome: Progressing  Note: Patient updated on POC, activity orders and plan for future discharge     Problem: Pain - Standard  Goal: Alleviation of pain or a reduction in pain to the patient’s comfort goal  Outcome: Progressing  Note: PRN pain medications in use for pain control.         Patient is not progressing towards the following goals:

## 2022-07-30 NOTE — WOUND TEAM
Wound team consulted to place prevena on L TKA incision.  Placed and achieved good seal with customizable package.

## 2022-07-30 NOTE — PROGRESS NOTES
Hospital Medicine Daily Progress Note    Date of Service  7/29/2022    Chief Complaint  April Alicia is a 62 y.o. female admitted 7/10/2022 with hypotension, AMS    Hospital Course  61yo PMHx coccaine and ETOH abuse though sober x 8 years per her report, cirrhosis, asthma, AFib on apixaban, DM, HTN. Took an unknown narcotic.  In ED responded to narcan, labs showing Na 121, Cl 89, Creat 5.35, CXR showing cardiomegally.  Admitted to the floor with AMS, HypoNa, dehydration, sepsis from urinary source.  Subsequent blood cultures + Strep Species    Bacteremia with group B streptococcal bacteremia. ID consulted. -on Rocephin, repeated blood culture NTD. TTE showed  EF of 60%, negative for vegetation.     ELIZABETH-resolved    Hx of L knee arthroplasty, left knee pain and swelling. Xray notes New lucency adjacent to the tibial component of arthroplasty suspicious for loosening or infection. Orthopedics consulted, s/p L knee arthrocentesis 7/16 c/w infection. s/p L knee revision, total arthroplasty with antibiotic spacer and wound vac 7/19.     MRI left shoulder showed possible septic arthritis/bursitis. s/p Left shoulder arthrotomy and lavage for infection with partial   Synovectomy. Left shoulder incision and drainage of the subdeltoid abscess 7/17.    ID rec cont ceftriaxone 2gm daily end 8/30/2022 and then follow up in ID clinic at the end of antibiotics course. Need potential aspiration of the knee to ensure no infection prior to replacing the hardware.     Interval Problem Update  7/26: Patient seen sleeping soundly.  Hemovac dc'd.  She did have a temp of 38.1 at 7/25.  She is also noted to have pancytopenia while on Rocephin.  ID contact infectious disease who did agree and switch to daptomycin to continue through 8/30/2022.  Patient is not medically cleared until stabilization of her hemoglobin.  CBC daily and CPK weekly ordered while on daptomycin.  7/27: Patient feeling improved.  She is still complaining of  gastric pain.  She has been on Protonix IV twice daily I did switch it to Prilosec twice daily with Tums.  Her hemoglobin has increased from 7-7.3 after switching from Rocephin to daptomycin yesterday.  No obvious source of bleeding.  I have added a vitamin B12 level.  Ferritin was 213 and normal.  SONY drain fell out of left shoulder but otherwise shoulder incision appears clean dry and intact.  Left knee Prevena in place.  7/28: Patient states she was treated for hepatitis C in [place] previously.  She was a previous heavy alcohol user as well.  She has been clean and sober for over a year.  Blood hemoglobin dropped to 6.8 this AM.  Status post 1 unit packed red blood cells.  INR 2.25 from liver disease.  I have ordered repeat hemoglobin posttransfusion and discontinued Eliquis since she is only taking it for atrial fibrillation.  7/29: Hemoglobin stabilizing off Eliquis.  Holding anticoagulation until hemoglobin stabilizes.  Currently at 7.5.  Patient has pancytopenia from hepatitis C and cirrhosis of the liver.  She is hypotensive I have discontinued her Cozaar 25 mg daily.  X-ray reviewed left hand showing erosive osteoarthritis only no new fracture.  Check vitamin D level.    I have discussed this patient's plan of care and discharge plan at IDT rounds today with Case Management, Nursing, Nursing leadership, and other members of the IDT team.    Consultants/Specialty  ID  ortho    Code Status  Full Code    Disposition  Patient is medically cleared for discharge.    Changed Rocephin to daptomycin.  Occult blood stool pending.  Anticipate discharge to to skilled nursing facility.  TTWB LLE.  I have placed the appropriate orders for post-discharge needs.    Review of Systems  Review of Systems   Constitutional: Positive for malaise/fatigue. Negative for chills and fever.   HENT: Negative for nosebleeds and sore throat.    Eyes: Negative for blurred vision and double vision.   Respiratory: Negative for cough  and shortness of breath.    Cardiovascular: Negative for chest pain, palpitations and leg swelling.   Gastrointestinal: Positive for abdominal pain (epigastric). Negative for diarrhea, nausea and vomiting.   Genitourinary: Negative for dysuria.   Musculoskeletal: Positive for back pain, joint pain and myalgias.        L arm pain; left knee pain and swelling    Skin: Negative for rash.   Neurological: Negative for dizziness, loss of consciousness and headaches.        Physical Exam  Temp:  [36.4 °C (97.6 °F)-37.1 °C (98.7 °F)] 36.4 °C (97.6 °F)  Pulse:  [63-74] 66  Resp:  [18] 18  BP: ()/(56-66) 98/57  SpO2:  [98 %-99 %] 98 %    Physical Exam  Vitals reviewed.   Constitutional:       General: She is not in acute distress.     Appearance: She is well-developed. She is ill-appearing. She is not diaphoretic.   HENT:      Head: Normocephalic and atraumatic.      Mouth/Throat:      Mouth: Mucous membranes are moist.   Eyes:      Extraocular Movements: Extraocular movements intact.      Conjunctiva/sclera: Conjunctivae normal.   Neck:      Vascular: No JVD.   Cardiovascular:      Rate and Rhythm: Normal rate and regular rhythm.      Pulses: Normal pulses.   Pulmonary:      Effort: Pulmonary effort is normal. No respiratory distress.      Breath sounds: No stridor. No wheezing or rales.   Abdominal:      Palpations: Abdomen is soft.      Tenderness: There is no abdominal tenderness. There is no guarding or rebound.   Musculoskeletal:         General: Swelling and tenderness present.      Cervical back: Neck supple. No rigidity or tenderness.      Right lower leg: No edema.      Left lower leg: No edema.      Comments: Left knee swelling, tenderness, status post surgery, on dressing, clean  Left shoulder clean dressing, SONY drain in place     Skin:     General: Skin is warm and dry.   Neurological:      Mental Status: She is alert and oriented to person, place, and time. Mental status is at baseline.   Psychiatric:          Mood and Affect: Mood normal.         Behavior: Behavior normal.         Thought Content: Thought content normal.         Fluids    Intake/Output Summary (Last 24 hours) at 7/29/2022 1750  Last data filed at 7/29/2022 0800  Gross per 24 hour   Intake 200 ml   Output --   Net 200 ml       Laboratory  Recent Labs     07/27/22  0207 07/28/22  0303 07/28/22  2108 07/29/22  0316   WBC 4.3* 3.8*  --  3.8*   RBC 2.61* 2.41*  --  2.66*   HEMOGLOBIN 7.3* 6.8* 7.9* 7.5*   HEMATOCRIT 22.3* 20.7*  --  22.8*   MCV 85.4 85.9  --  85.7   MCH 28.0 28.2  --  28.2   MCHC 32.7* 32.9*  --  32.9*   RDW 46.7 46.5  --  47.0   PLATELETCT 122* 105*  --  103*   MPV 9.3 10.1  --  9.8         Recent Labs     07/28/22  0654   INR 2.25*               Imaging  DX-HAND 2- RIGHT   Final Result      1. Interval development of osteopenia.   2. Marked erosive changes involving the right wrist and right fingers, most compatible with erosive osteoarthritis. Involvement is greatest involving the right first carpometacarpal joint.      US-EXTREMITY VENOUS UPPER UNILAT LEFT   Final Result      IR-PICC LINE PLACEMENT W/ GUIDANCE > AGE 5   Final Result                  Ultrasound-guided PICC placement performed by qualified nursing staff as    above.          DX-CHEST-FOR LINE PLACEMENT Perform procedure in: Patient's Room   Final Result      1.  Peripherally inserted catheter has been placed and the tip projects appropriately over the superior vena cava.   2.  Stable enlargement of the cardiomediastinal silhouette.   3.  Stable patchy bilateral interstitial opacities.      DX-KNEE 2- LEFT   Final Result      Status post removal of the tibial component of the left knee arthroplasty      MR-SHOULDER-W/O LEFT   Final Result      1.  Large joint effusion with a large amount of fluid seen extending into the subacromial/subdeltoid bursa with synovitis and small low signal foci within the bursa consistent with pockets of gas. These findings are very  suspicious for septic arthritis    and septic bursitis.      2.  Complete full-thickness tear of the supraspinatus tendon with retraction to the bony glenoid.      3.  Full-thickness tear portion of the subscapularis tendon.      4.  Partial-thickness tear of the articular surface fibers of the infraspinatus tendon.      5.  Prominent muscle edema and atrophy involving the rotator cuff as well as all the muscles surrounding the shoulder which may represent myositis.      6.  Osteoarthritis of the glenohumeral joint.      7.  Diffuse tearing of the glenoid labrum.      8.  Subluxation of the long head of the biceps tendon medially from the bicipital groove with marked thinning of the intra-articular portion consistent with tendinosis.      9.  Extensive soft tissue edema to include fat and muscle involving all of the imaged structures around the shoulder likely representing cellulitis.      10.  Widening of the acromioclavicular joint with fragmentation of the distal clavicle consistent with old posttraumatic change versus surgical change.      [id number] VIEW   Final Result      Nonobstructive bowel gas pattern.      EC-ECHOCARDIOGRAM COMPLETE W/O CONT   Final Result      DX-CHEST-PORTABLE (1 VIEW)   Final Result      1.  Interstitial infiltrates versus edema.   2.  Cardiomegaly.      DX-KNEE 2- RIGHT   Final Result      1.  Negative for fracture or malalignment      2.  Right knee arthroplasty appears within normal limits      DX-KNEE 2- LEFT   Final Result      1.  New lucency adjacent to the tibial component of arthroplasty suspicious for loosening or infection      2.  No other finding      US-EXTREMITY VENOUS UPPER UNILAT LEFT   Final Result      US-RENAL   Final Result      1.  Unremarkable kidneys.   2.  Limited evaluation of the bladder.      CT-ABDOMEN-PELVIS W/O   Final Result         Limited exam due to lack of IV contrast.         1. Mild diffuse wall thickening of the urinary bladder could relate to  infection or inflammation. Correlate with UA. No hydronephrosis.   2. Nonspecific mildly prominent fluid-filled proximal small bowel in the left abdomen. This could be seen with enteritis, focal ileus or early obstruction.   3. Cirrhotic liver with portal hypertension and splenomegaly. Wall thickening of the ascending colon could relate to portal hypertension.   4. No ascites.      DX-SHOULDER 2+ LEFT   Final Result      1.  No evidence of acute fracture or dislocation.      2.  Old posttraumatic changes of the distal clavicle.      DX-CHEST-PORTABLE (1 VIEW)   Final Result      Cardiomegaly.           Assessment/Plan  * Bacteremia- (present on admission)  Assessment & Plan  2 bottles with group B strep   Patient reported history of severe bacteremia a year ago after knee surgery last year, which required skin grafting of both lower extremity and was hospitalized at [place] when she visited her son there  Repeat blood cultures NTD    Cont Rocephin  Check TTE negative for vegetation  ID rec cont daptomycin daily end 8/30/2022 and then follow up in ID clinic at the end of antibiotics course. Need potential aspiration of the knee to ensure no infection prior to replacing the hardware.       Erosive osteoarthritis of right hand  Assessment & Plan  Noted on xray 7/28.  Check vitamin D level.    Coagulopathy (HCC)  Assessment & Plan  INR 2.25.  H/o alcoholic cirrhosis of liver and Hepatitis C  7/28 dc'd eliquis for afib since requiring blood transfusion for Hg 6.8.    Hepatitis C infection  Assessment & Plan  Patient states she has been treated in [place] previously.      Drug-induced pancytopenia (HCC)  Assessment & Plan  Secondary to Rocephin.  7/26:  Changed rocephin to daptomycin IV.  Check daily CBC.  CPK and CBC weekly while on daptomycin.    Septic arthritis of knee, left (HCC)  Assessment & Plan  History of left knee replacement 2017, which was complicated with infection 2 weeks later  Patient had recurrent  left knee infection last year which was complicated with bacteremia and she was hospitalized at the [place] hospital when she visited her son     Xray notes New lucency adjacent to the tibial component of arthroplasty suspicious for loosening or infection.   Orthopedics consulted, s/p L knee arthrocentesis 7/16 c/w infection.   s/p L knee resection and antibiotic spacer 7/19  ID rec cont daptomycin IV daily end 8/30/2022 and then follow up in ID clinic at the end of antibiotics course. Need potential aspiration of the knee to ensure no infection prior to replacing the hardware.   TTWB LLE per ortho.    Septic arthritis of shoulder, left (HCC)- (present on admission)  Assessment & Plan  MRI left shoulder showed septic arthritis/subdeltoid abscess    Ortho consulted,  s/p Left shoulder arthrotomy and lavage for infection with partial synovectomy. Left shoulder incision and drainage of the subdeltoid abscess 7/17.  SONY drain out 7/27.  fluid culture NTD  On daptomycin until 8/30.    Iron deficiency anemia  Assessment & Plan  Acute on chronic  Iron study consistent with iron deficiency  Start po iron supplements  Check FOBT given remote hx of gastric ulcer and abdominal pain. On Eliquis. On PPI.  Per bedside RN, stool does not appear melanotic  Likely 2/2 to Rocephin given pancytopenia.    Hyponatremia- (present on admission)  Assessment & Plan  123 on admission  High SG on UA: probable hypovolemic and alcohol use  Cont to monitor    Resolved    Acute cystitis without hematuria  Assessment & Plan  On Rocephin/now daptomycin for bacteremia    Metabolic encephalopathy- (present on admission)  Assessment & Plan  Likely multifactorial in conjunction with narcotic use  S/p Narcan treatment, improved  Start lacutlose  Cont to treat infection     resolved      Hyperlipidemia- (present on admission)  Assessment & Plan  Continue Lipitor    ELIZABETH (acute kidney injury) (HCC)- (present on admission)  Assessment & Plan  Likely secondary  dehydration  4.7 on admission   Good UOP  DC IVFs  Cont to monitor  Renally dose medications as appropriate    Resolved    Atrial fibrillation (HCC)- (present on admission)  Assessment & Plan  On apixaban and dronedarone as outpt  7/28:  dc'd eliquis since INR 2.25 from cirrhosis liver and transfusion needed for Hg 6.8.  Rate controlled     Cirrhosis (HCC)- (present on admission)  Assessment & Plan  Secondary to ETOH  Lactulose  monitor    Hypertension- (present on admission)  Assessment & Plan  Resume the home losartan    Controlled type 2 diabetes mellitus with hyperglycemia, without long-term current use of insulin (HCC)- (present on admission)  Assessment & Plan  Sliding-scale insulin, diabetic diet  A1c 5.7%         VTE prophylaxis: SCDs/TEDs and therapeutic anticoagulation with Eliquis (on hold)    I have performed a physical exam and reviewed and updated ROS and Plan today (7/29/2022). In review of yesterday's note (7/28/2022), there are no changes except as documented above.

## 2022-07-30 NOTE — PROGRESS NOTES
"   Orthopaedic Progress Note    Interval changes:  Patient doing well  Vac to to be placed today  LUE dressings CDI     ROS - Patient denies any new issues.  Pain well controlled.    /62   Pulse 70   Temp 36.9 °C (98.4 °F) (Temporal)   Resp 18   Ht 1.715 m (5' 7.5\")   Wt 95.1 kg (209 lb 10.5 oz)   SpO2 96%       Patient seen and examined  No acute distress  Breathing non labored  RRR  LUE dressing CDI, DNVI, moves all fingers, cap refill <2 sec.  LLE dressing CDI, DNVI, moves all toes, cap refill <2 sec.     Recent Labs     07/28/22  0303 07/28/22  2108 07/29/22  0316 07/30/22  0211   WBC 3.8*  --  3.8* 4.4*   RBC 2.41*  --  2.66* 2.68*   HEMOGLOBIN 6.8* 7.9* 7.5* 7.6*   HEMATOCRIT 20.7*  --  22.8* 22.9*   MCV 85.9  --  85.7 85.4   MCH 28.2  --  28.2 28.4   MCHC 32.9*  --  32.9* 33.2*   RDW 46.5  --  47.0 46.7   PLATELETCT 105*  --  103* 130*   MPV 10.1  --  9.8 10.0       Active Hospital Problems    Diagnosis    • Hypertension [I10]      Priority: Medium   • Controlled type 2 diabetes mellitus with hyperglycemia, without long-term current use of insulin (HCC) [E11.65]      Priority: Medium   • Cirrhosis (HCC) [K74.60]      Priority: Low   • Vitamin D deficiency [E55.9]    • Erosive osteoarthritis of right hand [M15.4]    • Hepatitis C infection [B19.20]    • Coagulopathy (HCC) [D68.9]    • Drug-induced pancytopenia (HCC) [D61.811]    • Septic arthritis of knee, left (HCC) [M00.9]    • Septic arthritis of shoulder, left (HCC) [M00.9]    • Iron deficiency anemia [D50.9]    • Acute cystitis without hematuria [N30.00]    • Bacteremia [R78.81]    • Hyponatremia [E87.1]    • ELIZABETH (acute kidney injury) (HCC) [N17.9]    • Hyperlipidemia [E78.5]    • Metabolic encephalopathy [G93.41]    • Atrial fibrillation (HCC) [I48.91]        Assessment/Plan:  Patient doing well  POD#10 S/P  1.  left total knee arthroplasty explantation  2. left knee insertion of articulating spacer   3. left knee application of incisional " wound vacuum   POD#12 S/P   1.  Left shoulder arthrotomy and lavage for infection with partial synovectomy.  2.  Left shoulder incision and drainage of the subdeltoid abscess.  Wt bearing status - WBAT  Wound care/Drains - Dressings to be left in place  Future Procedures - none planned   Sutures/Staples out- 14 days post operatively  PT/OT-initiated  Antibiotics: dapto 570mg IV qd  DVT Prophylaxis- TEDS/SCDs/Foot pumps  Amaro-none  Case Coordination for Discharge Planning - Disposition pending abx needs

## 2022-07-31 LAB
BASOPHILS # BLD AUTO: 0.5 % (ref 0–1.8)
BASOPHILS # BLD: 0.02 K/UL (ref 0–0.12)
EOSINOPHIL # BLD AUTO: 0.19 K/UL (ref 0–0.51)
EOSINOPHIL NFR BLD: 4.7 % (ref 0–6.9)
ERYTHROCYTE [DISTWIDTH] IN BLOOD BY AUTOMATED COUNT: 46.5 FL (ref 35.9–50)
GLUCOSE BLD STRIP.AUTO-MCNC: 108 MG/DL (ref 65–99)
GLUCOSE BLD STRIP.AUTO-MCNC: 145 MG/DL (ref 65–99)
GLUCOSE BLD STRIP.AUTO-MCNC: 154 MG/DL (ref 65–99)
GLUCOSE BLD STRIP.AUTO-MCNC: 163 MG/DL (ref 65–99)
HCT VFR BLD AUTO: 22.1 % (ref 37–47)
HGB BLD-MCNC: 7.2 G/DL (ref 12–16)
IMM GRANULOCYTES # BLD AUTO: 0.03 K/UL (ref 0–0.11)
IMM GRANULOCYTES NFR BLD AUTO: 0.7 % (ref 0–0.9)
LYMPHOCYTES # BLD AUTO: 0.99 K/UL (ref 1–4.8)
LYMPHOCYTES NFR BLD: 24.7 % (ref 22–41)
MCH RBC QN AUTO: 27.8 PG (ref 27–33)
MCHC RBC AUTO-ENTMCNC: 32.6 G/DL (ref 33.6–35)
MCV RBC AUTO: 85.3 FL (ref 81.4–97.8)
MONOCYTES # BLD AUTO: 0.51 K/UL (ref 0–0.85)
MONOCYTES NFR BLD AUTO: 12.7 % (ref 0–13.4)
NEUTROPHILS # BLD AUTO: 2.27 K/UL (ref 2–7.15)
NEUTROPHILS NFR BLD: 56.7 % (ref 44–72)
NRBC # BLD AUTO: 0 K/UL
NRBC BLD-RTO: 0 /100 WBC
PLATELET # BLD AUTO: 126 K/UL (ref 164–446)
PMV BLD AUTO: 9.5 FL (ref 9–12.9)
RBC # BLD AUTO: 2.59 M/UL (ref 4.2–5.4)
WBC # BLD AUTO: 4 K/UL (ref 4.8–10.8)

## 2022-07-31 PROCEDURE — 700102 HCHG RX REV CODE 250 W/ 637 OVERRIDE(OP)

## 2022-07-31 PROCEDURE — 700111 HCHG RX REV CODE 636 W/ 250 OVERRIDE (IP): Performed by: HOSPITALIST

## 2022-07-31 PROCEDURE — 700101 HCHG RX REV CODE 250

## 2022-07-31 PROCEDURE — 700102 HCHG RX REV CODE 250 W/ 637 OVERRIDE(OP): Performed by: STUDENT IN AN ORGANIZED HEALTH CARE EDUCATION/TRAINING PROGRAM

## 2022-07-31 PROCEDURE — 700102 HCHG RX REV CODE 250 W/ 637 OVERRIDE(OP): Performed by: HOSPITALIST

## 2022-07-31 PROCEDURE — A9270 NON-COVERED ITEM OR SERVICE: HCPCS | Performed by: HOSPITALIST

## 2022-07-31 PROCEDURE — A9270 NON-COVERED ITEM OR SERVICE: HCPCS | Performed by: STUDENT IN AN ORGANIZED HEALTH CARE EDUCATION/TRAINING PROGRAM

## 2022-07-31 PROCEDURE — 99232 SBSQ HOSP IP/OBS MODERATE 35: CPT | Performed by: HOSPITALIST

## 2022-07-31 PROCEDURE — 82962 GLUCOSE BLOOD TEST: CPT | Mod: 91

## 2022-07-31 PROCEDURE — 770001 HCHG ROOM/CARE - MED/SURG/GYN PRIV*

## 2022-07-31 PROCEDURE — 700105 HCHG RX REV CODE 258: Performed by: HOSPITALIST

## 2022-07-31 PROCEDURE — A9270 NON-COVERED ITEM OR SERVICE: HCPCS

## 2022-07-31 PROCEDURE — 85025 COMPLETE CBC W/AUTO DIFF WBC: CPT

## 2022-07-31 RX ORDER — ASCORBIC ACID 500 MG
500 TABLET ORAL DAILY
Status: DISCONTINUED | OUTPATIENT
Start: 2022-07-31 | End: 2022-08-04 | Stop reason: HOSPADM

## 2022-07-31 RX ADMIN — CALCIUM CARBONATE 500 MG: 500 TABLET, CHEWABLE ORAL at 15:36

## 2022-07-31 RX ADMIN — ATORVASTATIN CALCIUM 20 MG: 20 TABLET, FILM COATED ORAL at 20:20

## 2022-07-31 RX ADMIN — OXYCODONE HYDROCHLORIDE 10 MG: 10 TABLET ORAL at 04:28

## 2022-07-31 RX ADMIN — DAPTOMYCIN 570 MG: 500 INJECTION, POWDER, LYOPHILIZED, FOR SOLUTION INTRAVENOUS at 04:28

## 2022-07-31 RX ADMIN — FERROUS SULFATE TAB 325 MG (65 MG ELEMENTAL FE) 325 MG: 325 (65 FE) TAB at 07:43

## 2022-07-31 RX ADMIN — OMEPRAZOLE 20 MG: 20 CAPSULE, DELAYED RELEASE ORAL at 04:28

## 2022-07-31 RX ADMIN — CALCIUM CARBONATE 500 MG: 500 TABLET, CHEWABLE ORAL at 21:29

## 2022-07-31 RX ADMIN — INSULIN HUMAN 3 UNITS: 100 INJECTION, SOLUTION PARENTERAL at 16:38

## 2022-07-31 RX ADMIN — OXYCODONE HYDROCHLORIDE 10 MG: 10 TABLET ORAL at 20:19

## 2022-07-31 RX ADMIN — DRONEDARONE 400 MG: 400 TABLET, FILM COATED ORAL at 16:38

## 2022-07-31 RX ADMIN — OXYCODONE HYDROCHLORIDE 10 MG: 10 TABLET ORAL at 13:22

## 2022-07-31 RX ADMIN — OMEPRAZOLE 20 MG: 20 CAPSULE, DELAYED RELEASE ORAL at 16:54

## 2022-07-31 RX ADMIN — Medication 4000 UNITS: at 16:55

## 2022-07-31 RX ADMIN — OXYCODONE HYDROCHLORIDE AND ACETAMINOPHEN 500 MG: 500 TABLET ORAL at 08:51

## 2022-07-31 RX ADMIN — CALCIUM CARBONATE 500 MG: 500 TABLET, CHEWABLE ORAL at 04:00

## 2022-07-31 RX ADMIN — LIDOCAINE 1 PATCH: 700 PATCH TOPICAL at 13:22

## 2022-07-31 RX ADMIN — DRONEDARONE 400 MG: 400 TABLET, FILM COATED ORAL at 07:43

## 2022-07-31 RX ADMIN — LACTULOSE 15 ML: 20 SOLUTION ORAL at 06:00

## 2022-07-31 ASSESSMENT — ENCOUNTER SYMPTOMS
BACK PAIN: 1
ABDOMINAL PAIN: 1
VOMITING: 0
MYALGIAS: 1
DIARRHEA: 0
LOSS OF CONSCIOUSNESS: 0
COUGH: 0
FEVER: 0
CHILLS: 0
BLURRED VISION: 0
DIZZINESS: 0
SORE THROAT: 0
SHORTNESS OF BREATH: 0
HEADACHES: 0
NAUSEA: 0
DOUBLE VISION: 0
PALPITATIONS: 0

## 2022-07-31 ASSESSMENT — PAIN SCALES - PAIN ASSESSMENT IN ADVANCED DEMENTIA (PAINAD)
TOTALSCORE: 0
FACIALEXPRESSION: SMILING OR INEXPRESSIVE
CONSOLABILITY: NO NEED TO CONSOLE
BODYLANGUAGE: RELAXED
BODYLANGUAGE: RELAXED
CONSOLABILITY: NO NEED TO CONSOLE
BREATHING: NORMAL
FACIALEXPRESSION: SMILING OR INEXPRESSIVE

## 2022-07-31 ASSESSMENT — PAIN SCALES - WONG BAKER
WONGBAKER_NUMERICALRESPONSE: DOESN'T HURT AT ALL
WONGBAKER_NUMERICALRESPONSE: DOESN'T HURT AT ALL

## 2022-07-31 ASSESSMENT — PAIN DESCRIPTION - PAIN TYPE
TYPE: ACUTE PAIN
TYPE: ACUTE PAIN

## 2022-07-31 NOTE — CARE PLAN
The patient is Stable - Low risk of patient condition declining or worsening    Problem: Pain - Standard  Goal: Alleviation of pain or a reduction in pain to the patient’s comfort goal  Outcome: Progressing     Problem: Skin Integrity  Goal: Skin integrity is maintained or improved  Outcome: Progressing     Problem: Fall Risk  Goal: Patient will remain free from falls  Outcome: Progressing     Shift Goals  Clinical Goals: Pain management  Patient Goals: Sleep  Family Goals: EDMUND

## 2022-07-31 NOTE — PROGRESS NOTES
Hospital Medicine Daily Progress Note    Date of Service  7/31/2022    Chief Complaint  April Alicia is a 62 y.o. female admitted 7/10/2022 with hypotension, AMS    Hospital Course  63yo PMHx coccaine and ETOH abuse though sober x 8 years per her report, cirrhosis, asthma, AFib on apixaban, DM, HTN. Took an unknown narcotic.  In ED responded to narcan, labs showing Na 121, Cl 89, Creat 5.35, CXR showing cardiomegally.  Admitted to the floor with AMS, HypoNa, dehydration, sepsis from urinary source.  Subsequent blood cultures + Strep Species    Bacteremia with group B streptococcal bacteremia. ID consulted. -on Rocephin, repeated blood culture NTD. TTE showed  EF of 60%, negative for vegetation.     ELIZABETH-resolved    Hx of L knee arthroplasty, left knee pain and swelling. Xray notes New lucency adjacent to the tibial component of arthroplasty suspicious for loosening or infection. Orthopedics consulted, s/p L knee arthrocentesis 7/16 c/w infection. s/p L knee revision, total arthroplasty with antibiotic spacer and wound vac 7/19.     MRI left shoulder showed possible septic arthritis/bursitis. s/p Left shoulder arthrotomy and lavage for infection with partial   Synovectomy. Left shoulder incision and drainage of the subdeltoid abscess 7/17.    ID rec cont ceftriaxone 2gm daily end 8/30/2022 and then follow up in ID clinic at the end of antibiotics course. Need potential aspiration of the knee to ensure no infection prior to replacing the hardware.     Interval Problem Update  7/26: Patient seen sleeping soundly.  Hemovac dc'd.  She did have a temp of 38.1 at 7/25.  She is also noted to have pancytopenia while on Rocephin.  ID contact infectious disease who did agree and switch to daptomycin to continue through 8/30/2022.  Patient is not medically cleared until stabilization of her hemoglobin.  CBC daily and CPK weekly ordered while on daptomycin.  7/27: Patient feeling improved.  She is still complaining of  gastric pain.  She has been on Protonix IV twice daily I did switch it to Prilosec twice daily with Tums.  Her hemoglobin has increased from 7-7.3 after switching from Rocephin to daptomycin yesterday.  No obvious source of bleeding.  I have added a vitamin B12 level.  Ferritin was 213 and normal.  SONY drain fell out of left shoulder but otherwise shoulder incision appears clean dry and intact.  Left knee Prevena in place.  7/28: Patient states she was treated for hepatitis C in [place] previously.  She was a previous heavy alcohol user as well.  She has been clean and sober for over a year.  Blood hemoglobin dropped to 6.8 this AM.  Status post 1 unit packed red blood cells.  INR 2.25 from liver disease.  I have ordered repeat hemoglobin posttransfusion and discontinued Eliquis since she is only taking it for atrial fibrillation.  7/29: Hemoglobin stabilizing off Eliquis.  Holding anticoagulation until hemoglobin stabilizes.  Currently at 7.5.  Patient has pancytopenia from hepatitis C and cirrhosis of the liver.  She is hypotensive I have discontinued her Cozaar 25 mg daily.  X-ray reviewed left hand showing erosive osteoarthritis only no new fracture.  Check vitamin D level.  7/30: Hemoglobin stable at 7.6.  Off Eliquis.  Right hand still painful.  Checked vitamin D level low at 9 started 4000 units D3 daily.  7/31:  No changes.  Patient tolerating pain well.  Eating well, Hg 7.2.    I have discussed this patient's plan of care and discharge plan at IDT rounds today with Case Management, Nursing, Nursing leadership, and other members of the IDT team.    Consultants/Specialty  ID  ortho    Code Status  Full Code    Disposition  Patient is medically cleared for discharge.    Changed Rocephin to daptomycin.  Occult blood stool negative.  Anticipate discharge to to skilled nursing facility.  Needs LEOBARDO. [facility] LLE.  I have placed the appropriate orders for post-discharge needs.    Review of Systems  Review of  Systems   Constitutional: Positive for malaise/fatigue. Negative for chills and fever.   HENT: Negative for nosebleeds and sore throat.    Eyes: Negative for blurred vision and double vision.   Respiratory: Negative for cough and shortness of breath.    Cardiovascular: Negative for chest pain, palpitations and leg swelling.   Gastrointestinal: Positive for abdominal pain (epigastric). Negative for diarrhea, nausea and vomiting.   Genitourinary: Negative for dysuria.   Musculoskeletal: Positive for back pain, joint pain and myalgias.        L arm pain; left knee pain and swelling    Skin: Negative for rash.   Neurological: Negative for dizziness, loss of consciousness and headaches.        Physical Exam  Temp:  [37 °C (98.6 °F)-37.4 °C (99.4 °F)] 37 °C (98.6 °F)  Pulse:  [62-89] 72  Resp:  [18] 18  BP: (108-121)/(58-74) 121/74  SpO2:  [98 %] 98 %    Physical Exam  Vitals reviewed.   Constitutional:       General: She is not in acute distress.     Appearance: She is well-developed. She is ill-appearing. She is not diaphoretic.   HENT:      Head: Normocephalic and atraumatic.      Mouth/Throat:      Mouth: Mucous membranes are moist.   Eyes:      Extraocular Movements: Extraocular movements intact.      Conjunctiva/sclera: Conjunctivae normal.   Neck:      Vascular: No JVD.   Cardiovascular:      Rate and Rhythm: Normal rate and regular rhythm.      Pulses: Normal pulses.   Pulmonary:      Effort: Pulmonary effort is normal. No respiratory distress.      Breath sounds: No stridor. No wheezing or rales.   Abdominal:      Palpations: Abdomen is soft.      Tenderness: There is no abdominal tenderness. There is no guarding or rebound.   Musculoskeletal:         General: Swelling and tenderness present.      Cervical back: Neck supple. No rigidity or tenderness.      Right lower leg: No edema.      Left lower leg: No edema.      Comments: Left knee swelling, tenderness, status post surgery, on dressing, clean  Left shoulder  clean dressing, SONY drain in place     Skin:     General: Skin is warm and dry.   Neurological:      Mental Status: She is alert and oriented to person, place, and time. Mental status is at baseline.   Psychiatric:         Mood and Affect: Mood normal.         Behavior: Behavior normal.         Thought Content: Thought content normal.         Fluids    Intake/Output Summary (Last 24 hours) at 7/31/2022 1633  Last data filed at 7/31/2022 1400  Gross per 24 hour   Intake 270 ml   Output 0 ml   Net 270 ml       Laboratory  Recent Labs     07/29/22  0316 07/30/22  0211 07/31/22  0430   WBC 3.8* 4.4* 4.0*   RBC 2.66* 2.68* 2.59*   HEMOGLOBIN 7.5* 7.6* 7.2*   HEMATOCRIT 22.8* 22.9* 22.1*   MCV 85.7 85.4 85.3   MCH 28.2 28.4 27.8   MCHC 32.9* 33.2* 32.6*   RDW 47.0 46.7 46.5   PLATELETCT 103* 130* 126*   MPV 9.8 10.0 9.5                       Imaging  DX-HAND 2- RIGHT   Final Result      1. Interval development of osteopenia.   2. Marked erosive changes involving the right wrist and right fingers, most compatible with erosive osteoarthritis. Involvement is greatest involving the right first carpometacarpal joint.      US-EXTREMITY VENOUS UPPER UNILAT LEFT   Final Result      IR-PICC LINE PLACEMENT W/ GUIDANCE > AGE 5   Final Result                  Ultrasound-guided PICC placement performed by qualified nursing staff as    above.          DX-CHEST-FOR LINE PLACEMENT Perform procedure in: Patient's Room   Final Result      1.  Peripherally inserted catheter has been placed and the tip projects appropriately over the superior vena cava.   2.  Stable enlargement of the cardiomediastinal silhouette.   3.  Stable patchy bilateral interstitial opacities.      DX-KNEE 2- LEFT   Final Result      Status post removal of the tibial component of the left knee arthroplasty      MR-SHOULDER-W/O LEFT   Final Result      1.  Large joint effusion with a large amount of fluid seen extending into the subacromial/subdeltoid bursa with  synovitis and small low signal foci within the bursa consistent with pockets of gas. These findings are very suspicious for septic arthritis    and septic bursitis.      2.  Complete full-thickness tear of the supraspinatus tendon with retraction to the bony glenoid.      3.  Full-thickness tear portion of the subscapularis tendon.      4.  Partial-thickness tear of the articular surface fibers of the infraspinatus tendon.      5.  Prominent muscle edema and atrophy involving the rotator cuff as well as all the muscles surrounding the shoulder which may represent myositis.      6.  Osteoarthritis of the glenohumeral joint.      7.  Diffuse tearing of the glenoid labrum.      8.  Subluxation of the long head of the biceps tendon medially from the bicipital groove with marked thinning of the intra-articular portion consistent with tendinosis.      9.  Extensive soft tissue edema to include fat and muscle involving all of the imaged structures around the shoulder likely representing cellulitis.      10.  Widening of the acromioclavicular joint with fragmentation of the distal clavicle consistent with old posttraumatic change versus surgical change.      ET-MUJKGEA-3 VIEW   Final Result      Nonobstructive bowel gas pattern.      EC-ECHOCARDIOGRAM COMPLETE W/O CONT   Final Result      DX-CHEST-PORTABLE (1 VIEW)   Final Result      1.  Interstitial infiltrates versus edema.   2.  Cardiomegaly.      DX-KNEE 2- RIGHT   Final Result      1.  Negative for fracture or malalignment      2.  Right knee arthroplasty appears within normal limits      DX-KNEE 2- LEFT   Final Result      1.  New lucency adjacent to the tibial component of arthroplasty suspicious for loosening or infection      2.  No other finding      US-EXTREMITY VENOUS UPPER UNILAT LEFT   Final Result      US-RENAL   Final Result      1.  Unremarkable kidneys.   2.  Limited evaluation of the bladder.      CT-ABDOMEN-PELVIS W/O   Final Result         Limited exam  due to lack of IV contrast.         1. Mild diffuse wall thickening of the urinary bladder could relate to infection or inflammation. Correlate with UA. No hydronephrosis.   2. Nonspecific mildly prominent fluid-filled proximal small bowel in the left abdomen. This could be seen with enteritis, focal ileus or early obstruction.   3. Cirrhotic liver with portal hypertension and splenomegaly. Wall thickening of the ascending colon could relate to portal hypertension.   4. No ascites.      DX-SHOULDER 2+ LEFT   Final Result      1.  No evidence of acute fracture or dislocation.      2.  Old posttraumatic changes of the distal clavicle.      DX-CHEST-PORTABLE (1 VIEW)   Final Result      Cardiomegaly.           Assessment/Plan  * Bacteremia- (present on admission)  Assessment & Plan  2 bottles with group B strep   Patient reported history of severe bacteremia a year ago after knee surgery last year, which required skin grafting of both lower extremity and was hospitalized at [place] when she visited her son there  Repeat blood cultures NTD    Cont Rocephin  Check TTE negative for vegetation  ID rec cont daptomycin daily end 8/30/2022 and then follow up in ID clinic at the end of antibiotics course. Need potential aspiration of the knee to ensure no infection prior to replacing the hardware.       Vitamin D deficiency  Assessment & Plan  Vitamin D level 9,  Started D3 4000 units daily.    Erosive osteoarthritis of right hand  Assessment & Plan  Noted on xray 7/28.  Check vitamin D level.    Coagulopathy (HCC)  Assessment & Plan  INR 2.25.  H/o alcoholic cirrhosis of liver and Hepatitis C  7/28 dc'd eliquis for afib since requiring blood transfusion for Hg 6.8.    Hepatitis C infection  Assessment & Plan  Patient states she has been treated in [place] previously.      Drug-induced pancytopenia (HCC)  Assessment & Plan  Secondary to Rocephin.  7/26:  Changed rocephin to daptomycin IV.  Check daily CBC.  CPK and CBC  weekly while on daptomycin.    Septic arthritis of knee, left (HCC)  Assessment & Plan  History of left knee replacement 2017, which was complicated with infection 2 weeks later  Patient had recurrent left knee infection last year which was complicated with bacteremia and she was hospitalized at the [place] hospital when she visited her son     Xray notes New lucency adjacent to the tibial component of arthroplasty suspicious for loosening or infection.   Orthopedics consulted, s/p L knee arthrocentesis 7/16 c/w infection.   s/p L knee resection and antibiotic spacer 7/19  ID rec cont daptomycin IV daily end 8/30/2022 and then follow up in ID clinic at the end of antibiotics course. Need potential aspiration of the knee to ensure no infection prior to replacing the hardware.   TTWB LLE per ortho.    Septic arthritis of shoulder, left (HCC)- (present on admission)  Assessment & Plan  MRI left shoulder showed septic arthritis/subdeltoid abscess    Ortho consulted,  s/p Left shoulder arthrotomy and lavage for infection with partial synovectomy. Left shoulder incision and drainage of the subdeltoid abscess 7/17.  SONY drain out 7/27.  fluid culture NTD  On daptomycin until 8/30.    Iron deficiency anemia  Assessment & Plan  Acute on chronic  Iron study consistent with iron deficiency  Start po iron supplements  Check FOBT given remote hx of gastric ulcer and abdominal pain. On Eliquis. On PPI.  Per bedside RN, stool does not appear melanotic  Likely 2/2 to Rocephin given pancytopenia.    Hyponatremia- (present on admission)  Assessment & Plan  123 on admission  High SG on UA: probable hypovolemic and alcohol use  Cont to monitor    Resolved    Acute cystitis without hematuria  Assessment & Plan  On Rocephin/now daptomycin for bacteremia    Metabolic encephalopathy- (present on admission)  Assessment & Plan  Likely multifactorial in conjunction with narcotic use  S/p Narcan treatment, improved  Start lacutlose  Cont to  treat infection     resolved      Hyperlipidemia- (present on admission)  Assessment & Plan  Continue Lipitor    ELIZABETH (acute kidney injury) (HCC)- (present on admission)  Assessment & Plan  Likely secondary dehydration  4.7 on admission   Good UOP  DC IVFs  Cont to monitor  Renally dose medications as appropriate    Resolved    Atrial fibrillation (HCC)- (present on admission)  Assessment & Plan  On apixaban and dronedarone as outpt  7/28:  dc'd eliquis since INR 2.25 from cirrhosis liver and transfusion needed for Hg 6.8.  Rate controlled     Cirrhosis (HCC)- (present on admission)  Assessment & Plan  Secondary to ETOH  Lactulose  monitor    Hypertension- (present on admission)  Assessment & Plan  Resume the home losartan    Controlled type 2 diabetes mellitus with hyperglycemia, without long-term current use of insulin (HCC)- (present on admission)  Assessment & Plan  Sliding-scale insulin, diabetic diet  A1c 5.7%         VTE prophylaxis: SCDs/TEDs and therapeutic anticoagulation with Eliquis (on hold)    I have performed a physical exam and reviewed and updated ROS and Plan today (7/31/2022). In review of yesterday's note (7/30/2022), there are no changes except as documented above.

## 2022-08-01 PROBLEM — E43 PROTEIN-CALORIE MALNUTRITION, SEVERE (HCC): Status: ACTIVE | Noted: 2022-08-01

## 2022-08-01 LAB
ALBUMIN SERPL BCP-MCNC: 1.6 G/DL (ref 3.2–4.9)
ALBUMIN/GLOB SERPL: 0.5 G/DL
ALP SERPL-CCNC: 151 U/L (ref 30–99)
ALT SERPL-CCNC: 27 U/L (ref 2–50)
ANION GAP SERPL CALC-SCNC: 8 MMOL/L (ref 7–16)
AST SERPL-CCNC: 31 U/L (ref 12–45)
BASOPHILS # BLD AUTO: 0.5 % (ref 0–1.8)
BASOPHILS # BLD: 0.02 K/UL (ref 0–0.12)
BILIRUB SERPL-MCNC: 0.5 MG/DL (ref 0.1–1.5)
BUN SERPL-MCNC: 12 MG/DL (ref 8–22)
CALCIUM SERPL-MCNC: 6.7 MG/DL (ref 8.5–10.5)
CHLORIDE SERPL-SCNC: 110 MMOL/L (ref 96–112)
CO2 SERPL-SCNC: 17 MMOL/L (ref 20–33)
CREAT SERPL-MCNC: 0.54 MG/DL (ref 0.5–1.4)
EOSINOPHIL # BLD AUTO: 0.17 K/UL (ref 0–0.51)
EOSINOPHIL NFR BLD: 4.3 % (ref 0–6.9)
ERYTHROCYTE [DISTWIDTH] IN BLOOD BY AUTOMATED COUNT: 47.3 FL (ref 35.9–50)
GFR SERPLBLD CREATININE-BSD FMLA CKD-EPI: 104 ML/MIN/1.73 M 2
GLOBULIN SER CALC-MCNC: 3.3 G/DL (ref 1.9–3.5)
GLUCOSE BLD STRIP.AUTO-MCNC: 114 MG/DL (ref 65–99)
GLUCOSE BLD STRIP.AUTO-MCNC: 132 MG/DL (ref 65–99)
GLUCOSE BLD STRIP.AUTO-MCNC: 148 MG/DL (ref 65–99)
GLUCOSE BLD STRIP.AUTO-MCNC: 163 MG/DL (ref 65–99)
GLUCOSE BLD STRIP.AUTO-MCNC: 189 MG/DL (ref 65–99)
GLUCOSE BLD STRIP.AUTO-MCNC: 94 MG/DL (ref 65–99)
GLUCOSE BLD STRIP.AUTO-MCNC: 98 MG/DL (ref 65–99)
GLUCOSE SERPL-MCNC: 98 MG/DL (ref 65–99)
HCT VFR BLD AUTO: 21.2 % (ref 37–47)
HCV RNA SERPL NAA+PROBE-ACNC: NOT DETECTED IU/ML
HCV RNA SERPL NAA+PROBE-LOG IU: NOT DETECTED LOG IU/ML
HCV RNA SERPL QL NAA+PROBE: NOT DETECTED
HGB BLD-MCNC: 7 G/DL (ref 12–16)
IMM GRANULOCYTES # BLD AUTO: 0.04 K/UL (ref 0–0.11)
IMM GRANULOCYTES NFR BLD AUTO: 1 % (ref 0–0.9)
LYMPHOCYTES # BLD AUTO: 1.06 K/UL (ref 1–4.8)
LYMPHOCYTES NFR BLD: 27 % (ref 22–41)
MCH RBC QN AUTO: 28.5 PG (ref 27–33)
MCHC RBC AUTO-ENTMCNC: 33 G/DL (ref 33.6–35)
MCV RBC AUTO: 86.2 FL (ref 81.4–97.8)
MONOCYTES # BLD AUTO: 0.44 K/UL (ref 0–0.85)
MONOCYTES NFR BLD AUTO: 11.2 % (ref 0–13.4)
NEUTROPHILS # BLD AUTO: 2.19 K/UL (ref 2–7.15)
NEUTROPHILS NFR BLD: 56 % (ref 44–72)
NRBC # BLD AUTO: 0 K/UL
NRBC BLD-RTO: 0 /100 WBC
PLATELET # BLD AUTO: 133 K/UL (ref 164–446)
PMV BLD AUTO: 9.9 FL (ref 9–12.9)
POTASSIUM SERPL-SCNC: 3.5 MMOL/L (ref 3.6–5.5)
PROT SERPL-MCNC: 4.9 G/DL (ref 6–8.2)
RBC # BLD AUTO: 2.46 M/UL (ref 4.2–5.4)
SODIUM SERPL-SCNC: 135 MMOL/L (ref 135–145)
WBC # BLD AUTO: 3.9 K/UL (ref 4.8–10.8)

## 2022-08-01 PROCEDURE — 700102 HCHG RX REV CODE 250 W/ 637 OVERRIDE(OP): Performed by: STUDENT IN AN ORGANIZED HEALTH CARE EDUCATION/TRAINING PROGRAM

## 2022-08-01 PROCEDURE — A9270 NON-COVERED ITEM OR SERVICE: HCPCS | Performed by: HOSPITALIST

## 2022-08-01 PROCEDURE — 85025 COMPLETE CBC W/AUTO DIFF WBC: CPT

## 2022-08-01 PROCEDURE — 700102 HCHG RX REV CODE 250 W/ 637 OVERRIDE(OP): Performed by: HOSPITALIST

## 2022-08-01 PROCEDURE — 99232 SBSQ HOSP IP/OBS MODERATE 35: CPT | Performed by: HOSPITALIST

## 2022-08-01 PROCEDURE — A9270 NON-COVERED ITEM OR SERVICE: HCPCS | Performed by: STUDENT IN AN ORGANIZED HEALTH CARE EDUCATION/TRAINING PROGRAM

## 2022-08-01 PROCEDURE — 80053 COMPREHEN METABOLIC PANEL: CPT

## 2022-08-01 PROCEDURE — 700102 HCHG RX REV CODE 250 W/ 637 OVERRIDE(OP)

## 2022-08-01 PROCEDURE — 700111 HCHG RX REV CODE 636 W/ 250 OVERRIDE (IP): Performed by: HOSPITALIST

## 2022-08-01 PROCEDURE — 700105 HCHG RX REV CODE 258: Performed by: HOSPITALIST

## 2022-08-01 PROCEDURE — A9270 NON-COVERED ITEM OR SERVICE: HCPCS

## 2022-08-01 PROCEDURE — 82962 GLUCOSE BLOOD TEST: CPT | Mod: 91

## 2022-08-01 PROCEDURE — 770001 HCHG ROOM/CARE - MED/SURG/GYN PRIV*

## 2022-08-01 PROCEDURE — 97530 THERAPEUTIC ACTIVITIES: CPT

## 2022-08-01 RX ORDER — POTASSIUM CHLORIDE 20 MEQ/1
40 TABLET, EXTENDED RELEASE ORAL ONCE
Status: COMPLETED | OUTPATIENT
Start: 2022-08-01 | End: 2022-08-01

## 2022-08-01 RX ADMIN — CALCIUM CARBONATE 500 MG: 500 TABLET, CHEWABLE ORAL at 02:25

## 2022-08-01 RX ADMIN — OXYCODONE HYDROCHLORIDE 10 MG: 10 TABLET ORAL at 08:24

## 2022-08-01 RX ADMIN — CALCIUM CARBONATE 500 MG: 500 TABLET, CHEWABLE ORAL at 20:20

## 2022-08-01 RX ADMIN — DRONEDARONE 400 MG: 400 TABLET, FILM COATED ORAL at 16:55

## 2022-08-01 RX ADMIN — INSULIN HUMAN 3 UNITS: 100 INJECTION, SOLUTION PARENTERAL at 16:54

## 2022-08-01 RX ADMIN — CALCIUM CARBONATE 500 MG: 500 TABLET, CHEWABLE ORAL at 14:19

## 2022-08-01 RX ADMIN — OXYCODONE HYDROCHLORIDE 10 MG: 10 TABLET ORAL at 14:19

## 2022-08-01 RX ADMIN — ATORVASTATIN CALCIUM 20 MG: 20 TABLET, FILM COATED ORAL at 20:20

## 2022-08-01 RX ADMIN — OXYCODONE HYDROCHLORIDE 10 MG: 10 TABLET ORAL at 02:25

## 2022-08-01 RX ADMIN — Medication 4000 UNITS: at 16:57

## 2022-08-01 RX ADMIN — OXYCODONE HYDROCHLORIDE AND ACETAMINOPHEN 500 MG: 500 TABLET ORAL at 05:13

## 2022-08-01 RX ADMIN — FERROUS SULFATE TAB 325 MG (65 MG ELEMENTAL FE) 325 MG: 325 (65 FE) TAB at 08:15

## 2022-08-01 RX ADMIN — ACETAMINOPHEN 650 MG: 325 TABLET, FILM COATED ORAL at 00:57

## 2022-08-01 RX ADMIN — LACTULOSE 15 ML: 20 SOLUTION ORAL at 05:14

## 2022-08-01 RX ADMIN — DAPTOMYCIN 570 MG: 500 INJECTION, POWDER, LYOPHILIZED, FOR SOLUTION INTRAVENOUS at 05:14

## 2022-08-01 RX ADMIN — OMEPRAZOLE 20 MG: 20 CAPSULE, DELAYED RELEASE ORAL at 16:56

## 2022-08-01 RX ADMIN — DRONEDARONE 400 MG: 400 TABLET, FILM COATED ORAL at 08:16

## 2022-08-01 RX ADMIN — OXYCODONE HYDROCHLORIDE 10 MG: 10 TABLET ORAL at 20:19

## 2022-08-01 RX ADMIN — POTASSIUM CHLORIDE 40 MEQ: 1500 TABLET, EXTENDED RELEASE ORAL at 16:58

## 2022-08-01 RX ADMIN — ACETAMINOPHEN 650 MG: 325 TABLET, FILM COATED ORAL at 17:01

## 2022-08-01 RX ADMIN — OMEPRAZOLE 20 MG: 20 CAPSULE, DELAYED RELEASE ORAL at 05:14

## 2022-08-01 ASSESSMENT — PAIN SCALES - PAIN ASSESSMENT IN ADVANCED DEMENTIA (PAINAD)
TOTALSCORE: 0
BREATHING: NORMAL
BODYLANGUAGE: RELAXED
BREATHING: NORMAL
BODYLANGUAGE: RELAXED
CONSOLABILITY: NO NEED TO CONSOLE
CONSOLABILITY: NO NEED TO CONSOLE
FACIALEXPRESSION: SMILING OR INEXPRESSIVE
TOTALSCORE: 0
FACIALEXPRESSION: SMILING OR INEXPRESSIVE

## 2022-08-01 ASSESSMENT — ENCOUNTER SYMPTOMS
BACK PAIN: 1
DOUBLE VISION: 0
LOSS OF CONSCIOUSNESS: 0
MYALGIAS: 1
PALPITATIONS: 0
HEADACHES: 0
DIZZINESS: 0
COUGH: 0
VOMITING: 0
SORE THROAT: 0
ABDOMINAL PAIN: 1
CHILLS: 0
DIARRHEA: 0
BLURRED VISION: 0
SHORTNESS OF BREATH: 0
NAUSEA: 0
FEVER: 0

## 2022-08-01 ASSESSMENT — GAIT ASSESSMENTS: GAIT LEVEL OF ASSIST: UNABLE TO PARTICIPATE

## 2022-08-01 ASSESSMENT — COGNITIVE AND FUNCTIONAL STATUS - GENERAL
SUGGESTED CMS G CODE MODIFIER MOBILITY: CM
MOVING TO AND FROM BED TO CHAIR: A LOT
STANDING UP FROM CHAIR USING ARMS: TOTAL
MOBILITY SCORE: 9
MOVING FROM LYING ON BACK TO SITTING ON SIDE OF FLAT BED: A LOT
CLIMB 3 TO 5 STEPS WITH RAILING: TOTAL
TURNING FROM BACK TO SIDE WHILE IN FLAT BAD: A LOT
WALKING IN HOSPITAL ROOM: TOTAL

## 2022-08-01 ASSESSMENT — PAIN DESCRIPTION - PAIN TYPE: TYPE: ACUTE PAIN

## 2022-08-01 ASSESSMENT — PAIN SCALES - WONG BAKER
WONGBAKER_NUMERICALRESPONSE: DOESN'T HURT AT ALL
WONGBAKER_NUMERICALRESPONSE: DOESN'T HURT AT ALL

## 2022-08-01 NOTE — CARE PLAN
The patient is Stable - Low risk of patient condition declining or worsening    Shift Goals  Clinical Goals: Pain control  Patient Goals: Rest  Family Goals: EDMUND    Progress made toward(s) clinical / shift goals:    Problem: Pain - Standard  Goal: Alleviation of pain or a reduction in pain to the patient’s comfort goal  Outcome: Progressing     Problem: Skin Integrity  Goal: Skin integrity is maintained or improved  Outcome: Progressing

## 2022-08-01 NOTE — PROGRESS NOTES
Hospital Medicine Daily Progress Note    Date of Service  8/1/2022    Chief Complaint  April Alicia is a 62 y.o. female admitted 7/10/2022 with hypotension, AMS    Hospital Course  61yo PMHx coccaine and ETOH abuse though sober x 8 years per her report, cirrhosis, asthma, AFib on apixaban, DM, HTN. Took an unknown narcotic.  In ED responded to narcan, labs showing Na 121, Cl 89, Creat 5.35, CXR showing cardiomegally.  Admitted to the floor with AMS, HypoNa, dehydration, sepsis from urinary source.  Subsequent blood cultures + Strep Species    Bacteremia with group B streptococcal bacteremia. ID consulted. -on Rocephin, repeated blood culture NTD. TTE showed  EF of 60%, negative for vegetation.     ELIZABETH-resolved    Hx of L knee arthroplasty, left knee pain and swelling. Xray notes New lucency adjacent to the tibial component of arthroplasty suspicious for loosening or infection. Orthopedics consulted, s/p L knee arthrocentesis 7/16 c/w infection. s/p L knee revision, total arthroplasty with antibiotic spacer and wound vac 7/19.     MRI left shoulder showed possible septic arthritis/bursitis. s/p Left shoulder arthrotomy and lavage for infection with partial   Synovectomy. Left shoulder incision and drainage of the subdeltoid abscess 7/17.    ID rec cont ceftriaxone 2gm daily end 8/30/2022 and then follow up in ID clinic at the end of antibiotics course. Need potential aspiration of the knee to ensure no infection prior to replacing the hardware.     Interval Problem Update  7/26: Patient seen sleeping soundly.  Hemovac dc'd.  She did have a temp of 38.1 at 7/25.  She is also noted to have pancytopenia while on Rocephin.  ID contact infectious disease who did agree and switch to daptomycin to continue through 8/30/2022.  Patient is not medically cleared until stabilization of her hemoglobin.  CBC daily and CPK weekly ordered while on daptomycin.  7/27: Patient feeling improved.  She is still complaining of  gastric pain.  She has been on Protonix IV twice daily I did switch it to Prilosec twice daily with Tums.  Her hemoglobin has increased from 7-7.3 after switching from Rocephin to daptomycin yesterday.  No obvious source of bleeding.  I have added a vitamin B12 level.  Ferritin was 213 and normal.  SONY drain fell out of left shoulder but otherwise shoulder incision appears clean dry and intact.  Left knee Prevena in place.  7/28: Patient states she was treated for hepatitis C in [place] previously.  She was a previous heavy alcohol user as well.  She has been clean and sober for over a year.  Blood hemoglobin dropped to 6.8 this AM.  Status post 1 unit packed red blood cells.  INR 2.25 from liver disease.  I have ordered repeat hemoglobin posttransfusion and discontinued Eliquis since she is only taking it for atrial fibrillation.  7/29: Hemoglobin stabilizing off Eliquis.  Holding anticoagulation until hemoglobin stabilizes.  Currently at 7.5.  Patient has pancytopenia from hepatitis C and cirrhosis of the liver.  She is hypotensive I have discontinued her Cozaar 25 mg daily.  X-ray reviewed left hand showing erosive osteoarthritis only no new fracture.  Check vitamin D level.  7/30: Hemoglobin stable at 7.6.  Off Eliquis.  Right hand still painful.  Checked vitamin D level low at 9 started 4000 units D3 daily.  7/31:  No changes.  Patient tolerating pain well.  Eating well, Hg 7.2.  8/1: Hemoglobin 7.2-7.0.  Patient has wound vac on left knee incision likely source of slow blood loss.  Patient's albumin 1.6.  I have started protein supplements 3 times daily.    I have discussed this patient's plan of care and discharge plan at IDT rounds today with Case Management, Nursing, Nursing leadership, and other members of the IDT team.    Consultants/Specialty  ID  ortho    Code Status  Full Code    Disposition  Patient is medically cleared for discharge.    Changed Rocephin to daptomycin.  Occult blood stool  negative.  Anticipate discharge to to skilled nursing facility.  Needs LE[facility]ARDO.   May qualify for LTAC given rehab prolonged antibiotics stop date 8/31.  TTWB LLE.  I have placed the appropriate orders for post-discharge needs.    Review of Systems  Review of Systems   Constitutional: Positive for malaise/fatigue. Negative for chills and fever.   HENT: Negative for nosebleeds and sore throat.    Eyes: Negative for blurred vision and double vision.   Respiratory: Negative for cough and shortness of breath.    Cardiovascular: Negative for chest pain, palpitations and leg swelling.   Gastrointestinal: Positive for abdominal pain (epigastric). Negative for diarrhea, nausea and vomiting.   Genitourinary: Negative for dysuria.   Musculoskeletal: Positive for back pain, joint pain and myalgias.        L arm pain; left knee pain and swelling    Skin: Negative for rash.   Neurological: Negative for dizziness, loss of consciousness and headaches.        Physical Exam  Temp:  [36.5 °C (97.7 °F)-37.3 °C (99.1 °F)] 36.7 °C (98.1 °F)  Pulse:  [66-76] 71  Resp:  [16-18] 18  BP: (100-126)/(67-76) 124/74  SpO2:  [97 %-99 %] 98 %    Physical Exam  Vitals reviewed.   Constitutional:       General: She is not in acute distress.     Appearance: She is well-developed. She is ill-appearing. She is not diaphoretic.   HENT:      Head: Normocephalic and atraumatic.      Mouth/Throat:      Mouth: Mucous membranes are moist.   Eyes:      Extraocular Movements: Extraocular movements intact.      Conjunctiva/sclera: Conjunctivae normal.   Neck:      Vascular: No JVD.   Cardiovascular:      Rate and Rhythm: Normal rate and regular rhythm.      Pulses: Normal pulses.   Pulmonary:      Effort: Pulmonary effort is normal. No respiratory distress.      Breath sounds: No stridor. No wheezing or rales.   Abdominal:      Palpations: Abdomen is soft.      Tenderness: There is no abdominal tenderness. There is no guarding or rebound.   Musculoskeletal:          General: Swelling and tenderness present.      Cervical back: Neck supple. No rigidity or tenderness.      Right lower leg: No edema.      Left lower leg: No edema.      Comments: Left knee swelling, tenderness, status post surgery, on dressing, clean  Left shoulder clean dressing, SONY drain in place     Skin:     General: Skin is warm and dry.   Neurological:      Mental Status: She is alert and oriented to person, place, and time. Mental status is at baseline.   Psychiatric:         Mood and Affect: Mood normal.         Behavior: Behavior normal.         Thought Content: Thought content normal.         Fluids    Intake/Output Summary (Last 24 hours) at 8/1/2022 1426  Last data filed at 8/1/2022 1000  Gross per 24 hour   Intake 270 ml   Output 1700 ml   Net -1430 ml       Laboratory  Recent Labs     07/30/22  0211 07/31/22  0430 08/01/22  0241   WBC 4.4* 4.0* 3.9*   RBC 2.68* 2.59* 2.46*   HEMOGLOBIN 7.6* 7.2* 7.0*   HEMATOCRIT 22.9* 22.1* 21.2*   MCV 85.4 85.3 86.2   MCH 28.4 27.8 28.5   MCHC 33.2* 32.6* 33.0*   RDW 46.7 46.5 47.3   PLATELETCT 130* 126* 133*   MPV 10.0 9.5 9.9     Recent Labs     08/01/22  0241   SODIUM 135   POTASSIUM 3.5*   CHLORIDE 110   CO2 17*   GLUCOSE 98   BUN 12   CREATININE 0.54   CALCIUM 6.7*                   Imaging  DX-HAND 2- RIGHT   Final Result      1. Interval development of osteopenia.   2. Marked erosive changes involving the right wrist and right fingers, most compatible with erosive osteoarthritis. Involvement is greatest involving the right first carpometacarpal joint.      US-EXTREMITY VENOUS UPPER UNILAT LEFT   Final Result      IR-PICC LINE PLACEMENT W/ GUIDANCE > AGE 5   Final Result                  Ultrasound-guided PICC placement performed by qualified nursing staff as    above.          DX-CHEST-FOR LINE PLACEMENT Perform procedure in: Patient's Room   Final Result      1.  Peripherally inserted catheter has been placed and the tip projects appropriately over the  superior vena cava.   2.  Stable enlargement of the cardiomediastinal silhouette.   3.  Stable patchy bilateral interstitial opacities.      DX-KNEE 2- LEFT   Final Result      Status post removal of the tibial component of the left knee arthroplasty      MR-SHOULDER-W/O LEFT   Final Result      1.  Large joint effusion with a large amount of fluid seen extending into the subacromial/subdeltoid bursa with synovitis and small low signal foci within the bursa consistent with pockets of gas. These findings are very suspicious for septic arthritis    and septic bursitis.      2.  Complete full-thickness tear of the supraspinatus tendon with retraction to the bony glenoid.      3.  Full-thickness tear portion of the subscapularis tendon.      4.  Partial-thickness tear of the articular surface fibers of the infraspinatus tendon.      5.  Prominent muscle edema and atrophy involving the rotator cuff as well as all the muscles surrounding the shoulder which may represent myositis.      6.  Osteoarthritis of the glenohumeral joint.      7.  Diffuse tearing of the glenoid labrum.      8.  Subluxation of the long head of the biceps tendon medially from the bicipital groove with marked thinning of the intra-articular portion consistent with tendinosis.      9.  Extensive soft tissue edema to include fat and muscle involving all of the imaged structures around the shoulder likely representing cellulitis.      10.  Widening of the acromioclavicular joint with fragmentation of the distal clavicle consistent with old posttraumatic change versus surgical change.      NT-MSMEJYE-3 VIEW   Final Result      Nonobstructive bowel gas pattern.      EC-ECHOCARDIOGRAM COMPLETE W/O CONT   Final Result      DX-CHEST-PORTABLE (1 VIEW)   Final Result      1.  Interstitial infiltrates versus edema.   2.  Cardiomegaly.      DX-KNEE 2- RIGHT   Final Result      1.  Negative for fracture or malalignment      2.  Right knee arthroplasty appears  within normal limits      DX-KNEE 2- LEFT   Final Result      1.  New lucency adjacent to the tibial component of arthroplasty suspicious for loosening or infection      2.  No other finding      US-EXTREMITY VENOUS UPPER UNILAT LEFT   Final Result      US-RENAL   Final Result      1.  Unremarkable kidneys.   2.  Limited evaluation of the bladder.      CT-ABDOMEN-PELVIS W/O   Final Result         Limited exam due to lack of IV contrast.         1. Mild diffuse wall thickening of the urinary bladder could relate to infection or inflammation. Correlate with UA. No hydronephrosis.   2. Nonspecific mildly prominent fluid-filled proximal small bowel in the left abdomen. This could be seen with enteritis, focal ileus or early obstruction.   3. Cirrhotic liver with portal hypertension and splenomegaly. Wall thickening of the ascending colon could relate to portal hypertension.   4. No ascites.      DX-SHOULDER 2+ LEFT   Final Result      1.  No evidence of acute fracture or dislocation.      2.  Old posttraumatic changes of the distal clavicle.      DX-CHEST-PORTABLE (1 VIEW)   Final Result      Cardiomegaly.           Assessment/Plan  * Bacteremia- (present on admission)  Assessment & Plan  2 bottles with group B strep   Patient reported history of severe bacteremia a year ago after knee surgery last year, which required skin grafting of both lower extremity and was hospitalized at [place] when she visited her son there  Repeat blood cultures NTD    Cont Rocephin  Check TTE negative for vegetation  ID rec cont daptomycin daily end 8/30/2022 and then follow up in ID clinic at the end of antibiotics course. Need potential aspiration of the knee to ensure no infection prior to replacing the hardware.       Protein-calorie malnutrition, severe (HCC)  Assessment & Plan  Albumin 1.6.  Patient has prolonged chronic infections on antibiotics.  She also has a history of hepatitis C which was treated as well as cirrhosis of the  liver from alcohol abuse.  Place patient on boost plus 3 times daily for albumin 1.6.  Ongoing wound VAC.      Vitamin D deficiency  Assessment & Plan  Vitamin D level 9,  Started D3 4000 units daily.    Erosive osteoarthritis of right hand  Assessment & Plan  Noted on xray 7/28.  Check vitamin D level.    Coagulopathy (HCC)  Assessment & Plan  INR 2.25.  H/o alcoholic cirrhosis of liver and Hepatitis C  7/28 dc'd eliquis for afib since requiring blood transfusion for Hg 6.8.    Hepatitis C infection  Assessment & Plan  Patient states she has been treated in [place] previously.      Drug-induced pancytopenia (HCC)  Assessment & Plan  Secondary to Rocephin.  7/26:  Changed rocephin to daptomycin IV.  Check daily CBC.  CPK and CBC weekly while on daptomycin.    Septic arthritis of knee, left (HCC)  Assessment & Plan  History of left knee replacement 2017, which was complicated with infection 2 weeks later  Patient had recurrent left knee infection last year which was complicated with bacteremia and she was hospitalized at the [place] hospital when she visited her son     Xray notes New lucency adjacent to the tibial component of arthroplasty suspicious for loosening or infection.   Orthopedics consulted, s/p L knee arthrocentesis 7/16 c/w infection.   s/p L knee resection and antibiotic spacer 7/19  ID rec cont daptomycin IV daily end 8/30/2022 and then follow up in ID clinic at the end of antibiotics course. Need potential aspiration of the knee to ensure no infection prior to replacing the hardware.   TTWB LLE per ortho.    Septic arthritis of shoulder, left (HCC)- (present on admission)  Assessment & Plan  MRI left shoulder showed septic arthritis/subdeltoid abscess    Ortho consulted,  s/p Left shoulder arthrotomy and lavage for infection with partial synovectomy. Left shoulder incision and drainage of the subdeltoid abscess 7/17.  SONY drain out 7/27.  fluid culture NTD  On daptomycin until 8/30.    Iron  deficiency anemia  Assessment & Plan  Acute on chronic  Iron study consistent with iron deficiency  Start po iron supplements  Check FOBT given remote hx of gastric ulcer and abdominal pain. On Eliquis. On PPI.  Per bedside RN, stool does not appear melanotic  Likely 2/2 to Rocephin given pancytopenia.    Hyponatremia- (present on admission)  Assessment & Plan  123 on admission  High SG on UA: probable hypovolemic and alcohol use  Cont to monitor    Resolved    Acute cystitis without hematuria  Assessment & Plan  On Rocephin/now daptomycin for bacteremia    Metabolic encephalopathy- (present on admission)  Assessment & Plan  Likely multifactorial in conjunction with narcotic use  S/p Narcan treatment, improved  Start lacutlose  Cont to treat infection     resolved      Hyperlipidemia- (present on admission)  Assessment & Plan  Continue Lipitor    ELIZABETH (acute kidney injury) (HCC)- (present on admission)  Assessment & Plan  Likely secondary dehydration  4.7 on admission   Good UOP  DC IVFs  Cont to monitor  Renally dose medications as appropriate    Resolved    Atrial fibrillation (HCC)- (present on admission)  Assessment & Plan  On apixaban and dronedarone as outpt  7/28:  dc'd eliquis since INR 2.25 from cirrhosis liver and transfusion needed for Hg 6.8.  Rate controlled     Cirrhosis (HCC)- (present on admission)  Assessment & Plan  Secondary to ETOH  Lactulose  monitor    Hypertension- (present on admission)  Assessment & Plan  Resume the home losartan    Controlled type 2 diabetes mellitus with hyperglycemia, without long-term current use of insulin (HCC)- (present on admission)  Assessment & Plan  Sliding-scale insulin, diabetic diet  A1c 5.7%         VTE prophylaxis: SCDs/TEDs and therapeutic anticoagulation with Eliquis (on hold)    I have performed a physical exam and reviewed and updated ROS and Plan today (8/1/2022). In review of yesterday's note (7/31/2022), there are no changes except as documented  above.

## 2022-08-01 NOTE — CARE PLAN
Problem: Pain - Standard  Goal: Alleviation of pain or a reduction in pain to the patient’s comfort goal  Outcome: Progressing     Problem: Self Care  Goal: Patient will have the ability to perform ADLs independently or with assistance (bathe, groom, dress, toilet and feed)  Outcome: Progressing   The patient is Stable - Low risk of patient condition declining or worsening    Shift Goals  Clinical Goals: Pain control  Patient Goals: Rest  Family Goals: EDMUND    Progress made toward(s) clinical / shift goals:  Progressing    Patient is not progressing towards the following goals:

## 2022-08-01 NOTE — ASSESSMENT & PLAN NOTE
Albumin 1.6.  Patient has prolonged chronic infections on antibiotics.  She also has a history of hepatitis C which was treated as well as cirrhosis of the liver from alcohol abuse.  Place patient on boost plus 3 times daily for albumin 1.6.  Ongoing wound VAC.

## 2022-08-01 NOTE — DISCHARGE PLANNING
Case Management Discharge Planning    Admission Date: 7/10/2022  GMLOS: 7  ALOS: 22    6-Clicks ADL Score: 14  6-Clicks Mobility Score: 7  PT and/or OT Eval ordered: Yes  Post-acute Referrals Ordered: Yes  Post-acute Choice Obtained: Yes  Has referral(s) been sent to post-acute provider:  Yes      Anticipated Discharge Dispo: Discharge Disposition: D/T to Medicaid only Nursing home (64)    DME Needed: No    Action(s) Taken: This case was discussed today during IDT Rounds. Per MD, this patient is a candidate for LTAC, order received.    LSW met with patient at bedside and issued choice. Patient authorized referral to [facility] in [place]; however, LSW met with patient for a second time and explained, [facility] does not contract with Medicaid FFS.     The patient authorized the referral to [facility] although her preference is [facility]. LSW faxed the updated choice form to [facility].    3:00pm - Per [facility], [facility]. LSW sent a message via Teams to Juan  Supervisor and [facility] Case Management Director regarding the SNF Lease Bed.     Escalations Completed: None    Medically Clear: No    Next Steps: Awaiting medical clearance and response from [facility].    Barriers to Discharge: Medical clearance. IV Dapto.    Is the patient up for discharge tomorrow: No

## 2022-08-01 NOTE — DISCHARGE PLANNING
Received Choice form at 1225  Agency/Facility Name: [facility]  Referral sent per Choice form @ [phone]:  Agency/Facility Name: [facility]  Spoke To: Thu  Outcome: DPA was notified Pt does not qualify for LTAC since dapto is only being given once daily. IV ABX need to be given multiple times a day or needs to be taking more than 1 ABX to qualify.     SW notified.

## 2022-08-01 NOTE — PROGRESS NOTES
Attention order for hinged knee brace to be delivered to pt. Confirmed that pt has already been delivered a hinged knee brace for L LE. Brace is currently at pt's bedside ready to wear as needed.

## 2022-08-01 NOTE — PROGRESS NOTES
"   Orthopaedic Progress Note    Interval changes:  Patient doing well  Prevena vac dressing in place to LUE without leak, LUE dressings CDI     ROS - Patient denies any new issues.  Pain well controlled.    /71   Pulse 79   Temp 37.3 °C (99.1 °F) (Temporal)   Resp 18   Ht 1.715 m (5' 7.5\")   Wt 95.1 kg (209 lb 10.5 oz)   SpO2 99%       Patient seen and examined  No acute distress  Breathing non labored  RRR  LUE dressing CDI, DNVI, moves all fingers, cap refill <2 sec.  LLE vac dressing CDI without leak, DNVI, moves all toes, cap refill <2 sec.     Recent Labs     07/30/22  0211 07/31/22  0430 08/01/22  0241   WBC 4.4* 4.0* 3.9*   RBC 2.68* 2.59* 2.46*   HEMOGLOBIN 7.6* 7.2* 7.0*   HEMATOCRIT 22.9* 22.1* 21.2*   MCV 85.4 85.3 86.2   MCH 28.4 27.8 28.5   MCHC 33.2* 32.6* 33.0*   RDW 46.7 46.5 47.3   PLATELETCT 130* 126* 133*   MPV 10.0 9.5 9.9       Active Hospital Problems    Diagnosis    • Hypertension [I10]      Priority: Medium   • Controlled type 2 diabetes mellitus with hyperglycemia, without long-term current use of insulin (HCC) [E11.65]      Priority: Medium   • Cirrhosis (HCC) [K74.60]      Priority: Low   • Protein-calorie malnutrition, severe (HCC) [E43]    • Vitamin D deficiency [E55.9]    • Erosive osteoarthritis of right hand [M15.4]    • Hepatitis C infection [B19.20]    • Coagulopathy (HCC) [D68.9]    • Drug-induced pancytopenia (HCC) [D61.811]    • Septic arthritis of knee, left (HCC) [M00.9]    • Septic arthritis of shoulder, left (HCC) [M00.9]    • Iron deficiency anemia [D50.9]    • Acute cystitis without hematuria [N30.00]    • Bacteremia [R78.81]    • Hyponatremia [E87.1]    • ELIZABETH (acute kidney injury) (HCC) [N17.9]    • Hyperlipidemia [E78.5]    • Metabolic encephalopathy [G93.41]    • Atrial fibrillation (HCC) [I48.91]        Assessment/Plan:  Patient doing well  POD#12 S/P  1.  left total knee arthroplasty explantation  2. left knee insertion of articulating spacer   3. left knee " application of incisional wound vacuum   POD#14 S/P   1.  Left shoulder arthrotomy and lavage for infection with partial synovectomy.  2.  Left shoulder incision and drainage of the subdeltoid abscess.  Wt bearing status - WBAT  Wound care/Drains - Dressings to be left in place  Future Procedures - none planned   Sutures/Staples out- 14 days post operatively  PT/OT-initiated  Antibiotics: dapto 570mg IV qd  DVT Prophylaxis- TEDS/SCDs/Foot pumps  Amaro-none  Case Coordination for Discharge Planning - Disposition pending abx needs

## 2022-08-01 NOTE — PROGRESS NOTES
"   Orthopaedic Progress Note    Interval changes:  Patient doing well  Prevena vac dressing in place to LUE without leak, LUE dressings CDI     ROS - Patient denies any new issues.  Pain well controlled.    /74   Pulse 71   Temp 36.7 °C (98.1 °F) (Temporal)   Resp 18   Ht 1.715 m (5' 7.5\")   Wt 95.1 kg (209 lb 10.5 oz)   SpO2 98%       Patient seen and examined  No acute distress  Breathing non labored  RRR  LUE dressing CDI, DNVI, moves all fingers, cap refill <2 sec.  LLE vac dressing CDI without leak, DNVI, moves all toes, cap refill <2 sec.     Recent Labs     07/30/22  0211 07/31/22  0430 08/01/22  0241   WBC 4.4* 4.0* 3.9*   RBC 2.68* 2.59* 2.46*   HEMOGLOBIN 7.6* 7.2* 7.0*   HEMATOCRIT 22.9* 22.1* 21.2*   MCV 85.4 85.3 86.2   MCH 28.4 27.8 28.5   MCHC 33.2* 32.6* 33.0*   RDW 46.7 46.5 47.3   PLATELETCT 130* 126* 133*   MPV 10.0 9.5 9.9       Active Hospital Problems    Diagnosis    • Hypertension [I10]      Priority: Medium   • Controlled type 2 diabetes mellitus with hyperglycemia, without long-term current use of insulin (HCC) [E11.65]      Priority: Medium   • Cirrhosis (HCC) [K74.60]      Priority: Low   • Vitamin D deficiency [E55.9]    • Erosive osteoarthritis of right hand [M15.4]    • Hepatitis C infection [B19.20]    • Coagulopathy (HCC) [D68.9]    • Drug-induced pancytopenia (HCC) [D61.811]    • Septic arthritis of knee, left (HCC) [M00.9]    • Septic arthritis of shoulder, left (HCC) [M00.9]    • Iron deficiency anemia [D50.9]    • Acute cystitis without hematuria [N30.00]    • Bacteremia [R78.81]    • Hyponatremia [E87.1]    • ELIZABETH (acute kidney injury) (HCC) [N17.9]    • Hyperlipidemia [E78.5]    • Metabolic encephalopathy [G93.41]    • Atrial fibrillation (HCC) [I48.91]        Assessment/Plan:  Patient doing well  POD#11 S/P  1.  left total knee arthroplasty explantation  2. left knee insertion of articulating spacer   3. left knee application of incisional wound vacuum   POD#13 S/P "   1.  Left shoulder arthrotomy and lavage for infection with partial synovectomy.  2.  Left shoulder incision and drainage of the subdeltoid abscess.  Wt bearing status - WBAT  Wound care/Drains - Dressings to be left in place  Future Procedures - none planned   Sutures/Staples out- 14 days post operatively  PT/OT-initiated  Antibiotics: dapto 570mg IV qd  DVT Prophylaxis- TEDS/SCDs/Foot pumps  Amaro-none  Case Coordination for Discharge Planning - Disposition pending abx needs

## 2022-08-02 LAB
GLUCOSE BLD STRIP.AUTO-MCNC: 134 MG/DL (ref 65–99)
GLUCOSE BLD STRIP.AUTO-MCNC: 174 MG/DL (ref 65–99)
GLUCOSE BLD STRIP.AUTO-MCNC: 99 MG/DL (ref 65–99)

## 2022-08-02 PROCEDURE — 82962 GLUCOSE BLOOD TEST: CPT | Mod: 91

## 2022-08-02 PROCEDURE — A9270 NON-COVERED ITEM OR SERVICE: HCPCS | Performed by: HOSPITALIST

## 2022-08-02 PROCEDURE — 700105 HCHG RX REV CODE 258: Performed by: HOSPITALIST

## 2022-08-02 PROCEDURE — 770001 HCHG ROOM/CARE - MED/SURG/GYN PRIV*

## 2022-08-02 PROCEDURE — 700102 HCHG RX REV CODE 250 W/ 637 OVERRIDE(OP)

## 2022-08-02 PROCEDURE — 700102 HCHG RX REV CODE 250 W/ 637 OVERRIDE(OP): Performed by: STUDENT IN AN ORGANIZED HEALTH CARE EDUCATION/TRAINING PROGRAM

## 2022-08-02 PROCEDURE — 700111 HCHG RX REV CODE 636 W/ 250 OVERRIDE (IP): Performed by: HOSPITALIST

## 2022-08-02 PROCEDURE — 700102 HCHG RX REV CODE 250 W/ 637 OVERRIDE(OP): Performed by: HOSPITALIST

## 2022-08-02 PROCEDURE — A9270 NON-COVERED ITEM OR SERVICE: HCPCS

## 2022-08-02 PROCEDURE — 700101 HCHG RX REV CODE 250

## 2022-08-02 PROCEDURE — A9270 NON-COVERED ITEM OR SERVICE: HCPCS | Performed by: STUDENT IN AN ORGANIZED HEALTH CARE EDUCATION/TRAINING PROGRAM

## 2022-08-02 PROCEDURE — 99232 SBSQ HOSP IP/OBS MODERATE 35: CPT | Performed by: HOSPITALIST

## 2022-08-02 RX ORDER — ERGOCALCIFEROL 1.25 MG/1
50000 CAPSULE ORAL
Status: DISCONTINUED | OUTPATIENT
Start: 2022-08-02 | End: 2022-08-04 | Stop reason: HOSPADM

## 2022-08-02 RX ADMIN — LACTULOSE 15 ML: 20 SOLUTION ORAL at 04:49

## 2022-08-02 RX ADMIN — ACETAMINOPHEN 650 MG: 325 TABLET, FILM COATED ORAL at 17:30

## 2022-08-02 RX ADMIN — OXYCODONE HYDROCHLORIDE AND ACETAMINOPHEN 500 MG: 500 TABLET ORAL at 04:50

## 2022-08-02 RX ADMIN — ACETAMINOPHEN 650 MG: 325 TABLET, FILM COATED ORAL at 01:28

## 2022-08-02 RX ADMIN — INSULIN HUMAN 3 UNITS: 100 INJECTION, SOLUTION PARENTERAL at 17:23

## 2022-08-02 RX ADMIN — DRONEDARONE 400 MG: 400 TABLET, FILM COATED ORAL at 07:48

## 2022-08-02 RX ADMIN — DAPTOMYCIN 570 MG: 500 INJECTION, POWDER, LYOPHILIZED, FOR SOLUTION INTRAVENOUS at 04:50

## 2022-08-02 RX ADMIN — DRONEDARONE 400 MG: 400 TABLET, FILM COATED ORAL at 17:30

## 2022-08-02 RX ADMIN — CALCIUM CARBONATE 500 MG: 500 TABLET, CHEWABLE ORAL at 17:30

## 2022-08-02 RX ADMIN — OXYCODONE HYDROCHLORIDE 10 MG: 10 TABLET ORAL at 13:25

## 2022-08-02 RX ADMIN — OXYCODONE HYDROCHLORIDE 10 MG: 10 TABLET ORAL at 04:50

## 2022-08-02 RX ADMIN — OXYCODONE HYDROCHLORIDE 10 MG: 10 TABLET ORAL at 19:30

## 2022-08-02 RX ADMIN — LIDOCAINE 1 PATCH: 700 PATCH TOPICAL at 01:28

## 2022-08-02 RX ADMIN — FERROUS SULFATE TAB 325 MG (65 MG ELEMENTAL FE) 325 MG: 325 (65 FE) TAB at 07:48

## 2022-08-02 RX ADMIN — ERGOCALCIFEROL 50000 UNITS: 1.25 CAPSULE ORAL at 17:30

## 2022-08-02 RX ADMIN — ACETAMINOPHEN 650 MG: 325 TABLET, FILM COATED ORAL at 07:48

## 2022-08-02 RX ADMIN — OMEPRAZOLE 20 MG: 20 CAPSULE, DELAYED RELEASE ORAL at 17:30

## 2022-08-02 RX ADMIN — OMEPRAZOLE 20 MG: 20 CAPSULE, DELAYED RELEASE ORAL at 04:50

## 2022-08-02 RX ADMIN — ATORVASTATIN CALCIUM 20 MG: 20 TABLET, FILM COATED ORAL at 21:28

## 2022-08-02 ASSESSMENT — ENCOUNTER SYMPTOMS
NEUROLOGICAL NEGATIVE: 1
ABDOMINAL PAIN: 1
PSYCHIATRIC NEGATIVE: 1
CHILLS: 0
DOUBLE VISION: 0
VOMITING: 0
SHORTNESS OF BREATH: 0
BRUISES/BLEEDS EASILY: 0
RESPIRATORY NEGATIVE: 1
BLURRED VISION: 0
SORE THROAT: 0
FEVER: 0
NAUSEA: 0
PALPITATIONS: 0
DEPRESSION: 0
DIZZINESS: 0
HEADACHES: 0
BACK PAIN: 1
MYALGIAS: 0
WEAKNESS: 0
WEIGHT LOSS: 0
CARDIOVASCULAR NEGATIVE: 1
DIARRHEA: 0
FOCAL WEAKNESS: 0
EYES NEGATIVE: 1
DIAPHORESIS: 0
LOSS OF CONSCIOUSNESS: 0
COUGH: 0

## 2022-08-02 ASSESSMENT — PAIN SCALES - PAIN ASSESSMENT IN ADVANCED DEMENTIA (PAINAD)
BREATHING: NORMAL
FACIALEXPRESSION: SMILING OR INEXPRESSIVE
BODYLANGUAGE: RELAXED

## 2022-08-02 ASSESSMENT — LIFESTYLE VARIABLES: SUBSTANCE_ABUSE: 0

## 2022-08-02 ASSESSMENT — PAIN DESCRIPTION - PAIN TYPE
TYPE: ACUTE PAIN
TYPE: ACUTE PAIN

## 2022-08-02 ASSESSMENT — PAIN SCALES - WONG BAKER: WONGBAKER_NUMERICALRESPONSE: DOESN'T HURT AT ALL

## 2022-08-02 NOTE — CARE PLAN
The patient is Stable - Low risk of patient condition declining or worsening    Shift Goals  Clinical Goals: Pain managment  Patient Goals: pain managment  Family Goals: EDMUND    Progress made toward(s) clinical / shift goals:  see above    Patient is not progressing towards the following goals:

## 2022-08-02 NOTE — THERAPY
Physical Therapy   Daily Treatment     Patient Name: April Alicia  Age:  62 y.o., Sex:  female  Medical Record #: [id number]  Today's Date: 8/1/2022     Precautions  Precautions: Fall Risk;Toe Touch Weight Bearing Left Lower Extremity  Comments: hinged knee brace L LE    Assessment    Today, L shoulder pain limits any attempt at seated scoot alone edge of bed in preparation of trying seated slide board. Pt tolerated sitting up only, not able to attempt standing. See details below.     Plan    Continue current treatment plan.    DC Equipment Recommendations: Unable to determine at this time  Discharge Recommendations: Recommend post-acute placement for additional physical therapy services prior to discharge home         Objective       08/01/22 1726   Charge Group   Charges  Yes   PT Therapeutic Activities 2   Total Time Spent   PT Total Time Yes   PT Therapeutic Activities Time Spent (Mins) 25   PT Total Time Spent (Calculated) 25   Precautions   Precautions Fall Risk;Toe Touch Weight Bearing Left Lower Extremity   Comments hinged knee brace L LE   Vitals   O2 Delivery Device None - Room Air   Pain 0 - 10 Group   Therapist Pain Assessment During Activity;Nurse Notified  (L shoulder, L knee painful, nsg in with meds)   Cognition    Speech/ Communication Hard of Hearing   Comments agreeable, motivated.   Active ROM Lower Body    Active ROM Lower Body  X   Comments L LE limited by pain, extension most comfortable.   Strength Lower Body   Lower Body Strength  X   Comments needs assist to move L LE across mattress.   Balance   Sitting Balance (Static) Fair   Sitting Balance (Dynamic) Fair   Comments unable to attempt standing   Gait Analysis   Gait Level Of Assist Unable to Participate   Bed Mobility    Supine to Sit Moderate Assist   Sit to Supine Moderate Assist   Scooting Minimal Assist   Skilled Intervention Verbal Cuing   Comments increased assist needed due to L shoulder pain   Functional Mobility   Sit to  Stand Unable to Participate   How much difficulty does the patient currently have...   Turning over in bed (including adjusting bedclothes, sheets and blankets)? 2   Sitting down on and standing up from a chair with arms (e.g., wheelchair, bedside commode, etc.) 2   Moving from lying on back to sitting on the side of the bed? 2   How much help from another person does the patient currently need...   Moving to and from a bed to a chair (including a wheelchair)? 1   Need to walk in a hospital room? 1   Climbing 3-5 steps with a railing? 1   6 clicks Mobility Score 9   Activity Tolerance   Sitting Edge of Bed 15   Short Term Goals    Short Term Goal # 1 Patient will move supine<>sitting EOB without bed features with supervision within 6tx in order to get in/out of bed   Goal Outcome # 1 goal not met   Short Term Goal # 2 Patient will move sitting<>standing with supervision within 6tx in order to initiate transfers   Goal Outcome # 2 Goal not met   Short Term Goal # 3 Patient will self propel WC 50ft with supervision within 6tx in order to access environment   Goal Outcome # 3 Goal not met   Short Term Goal # 4 Patient will ascend/descend FOS with supervision using depression lift/buttock bump up method within 6tx in order to access apartment   Goal Outcome # 4 Goal not met   Education Group   Role of Physical Therapist Patient Response Patient;Acceptance;Explanation;Verbal Demonstration   Anticipated Discharge Equipment and Recommendations   DC Equipment Recommendations Unable to determine at this time   Discharge Recommendations Recommend post-acute placement for additional physical therapy services prior to discharge home   Interdisciplinary Plan of Care Collaboration   IDT Collaboration with  Nursing   Patient Position at End of Therapy In Bed;Tray Table within Reach;Phone within Reach;Call Light within Reach   Collaboration Comments nsg updated.   Session Information   Date / Session Number  8/1-7 (1/3, 8/6)

## 2022-08-02 NOTE — CARE PLAN
The patient is Stable - Low risk of patient condition declining or worsening    Shift Goals  Clinical Goals: Pain management  Patient Goals: Rest  Family Goals: EDMUND    Progress made toward(s) clinical / shift goals:    Problem: Pain - Standard  Goal: Alleviation of pain or a reduction in pain to the patient’s comfort goal  Outcome: Progressing     Problem: Skin Integrity  Goal: Skin integrity is maintained or improved  Outcome: Progressing

## 2022-08-02 NOTE — PROGRESS NOTES
Hospital Medicine Daily Progress Note    Date of Service  8/2/2022    Chief Complaint  April Alicia is a 62 y.o. female admitted 7/10/2022 with hypotension, AMS    Hospital Course  Patient is a 62 year old female with history of coccaine and ETOH abuse (though sober x 8 years per her report), cirrhosis, asthma, AFib on apixaban, DM, HTN. She presented to [facility] on 7/10/22 after reportedly taking an unknown narcotic. She was found to be minimally responsive and in the ER, she responded to narcan. Labs revealed a Na 121, Cl 89, Creat 5.35, CXR showing cardiomegally.  She was admitted to the floor with AMS, Hyponatremia, dehydration,and sepsis from urinary source.  Subsequent blood cultures + Strep Species    Bacteremia with group B streptococcal bacteremia. ID consulted. -on Rocephin, repeated blood culture NTD. TTE showed  EF of 60%, negative for vegetation.     ELIZABETH-resolved    Hx of L knee arthroplasty, left knee pain and swelling. Xray notes New lucency adjacent to the tibial component of arthroplasty suspicious for loosening or infection. Orthopedics consulted, s/p L knee arthrocentesis 7/16 c/w infection. s/p L knee revision, total arthroplasty with antibiotic spacer and wound vac 7/19.     MRI left shoulder showed possible septic arthritis/bursitis. s/p Left shoulder arthrotomy and lavage for infection with partial   Synovectomy. Left shoulder incision and drainage of the subdeltoid abscess 7/17.    ID rec cont ceftriaxone 2gm daily end 8/30/2022 and then follow up in ID clinic at the end of antibiotics course. Need potential aspiration of the knee to ensure no infection prior to replacing the hardware.     Interval Problem Update  Axox3, she reports chronic abdominal pain, she rates it at 3/10 and states it is unchanged, she has had it for several years. No sob, ros otherwise negative.     I have discussed this patient's plan of care and discharge plan at IDT rounds today with Case Management,  Nursing, Nursing leadership, and other members of the IDT team.    Consultants/Specialty  ID  ortho    Code Status  Full Code    Disposition  Patient is medically cleared for discharge.     Anticipate discharge to to skilled nursing facility.  Needs LEOBARDO.   May qualify for LTAC given rehab prolonged antibiotics stop date 8/31.  TTWB LLE.  I have placed the appropriate orders for post-discharge needs.    Review of Systems  Review of Systems   Constitutional: Positive for malaise/fatigue. Negative for chills, diaphoresis, fever and weight loss.   HENT: Negative.  Negative for nosebleeds and sore throat.    Eyes: Negative.  Negative for blurred vision and double vision.   Respiratory: Negative.  Negative for cough and shortness of breath.    Cardiovascular: Negative.  Negative for chest pain, palpitations and leg swelling.   Gastrointestinal: Positive for abdominal pain (epigastric). Negative for diarrhea, nausea and vomiting.   Genitourinary: Negative.  Negative for dysuria.   Musculoskeletal: Positive for back pain and joint pain. Negative for myalgias.        L arm pain; left knee pain and swelling    Skin: Negative.  Negative for itching and rash.   Neurological: Negative.  Negative for dizziness, focal weakness, loss of consciousness, weakness and headaches.   Endo/Heme/Allergies: Negative.  Does not bruise/bleed easily.   Psychiatric/Behavioral: Negative.  Negative for depression, substance abuse and suicidal ideas.   All other systems reviewed and are negative.       Physical Exam  Temp:  [36.6 °C (97.9 °F)-37.3 °C (99.1 °F)] 36.6 °C (97.9 °F)  Pulse:  [66-79] 70  Resp:  [17-18] 18  BP: ()/(53-71) 102/66  SpO2:  [97 %-99 %] 97 %    Physical Exam  Vitals reviewed.   Constitutional:       General: She is not in acute distress.     Appearance: She is well-developed. She is ill-appearing. She is not diaphoretic.   HENT:      Head: Normocephalic and atraumatic.      Mouth/Throat:      Mouth: Mucous membranes are  moist.   Eyes:      Extraocular Movements: Extraocular movements intact.      Conjunctiva/sclera: Conjunctivae normal.   Neck:      Vascular: No JVD.   Cardiovascular:      Rate and Rhythm: Normal rate and regular rhythm.      Pulses: Normal pulses.   Pulmonary:      Effort: Pulmonary effort is normal. No respiratory distress.      Breath sounds: No stridor. No wheezing or rales.   Abdominal:      Palpations: Abdomen is soft.      Tenderness: There is no abdominal tenderness. There is no guarding or rebound.   Musculoskeletal:         General: Swelling and tenderness present.      Cervical back: Neck supple. No rigidity or tenderness.      Right lower leg: No edema.      Left lower leg: No edema.      Comments: Left knee swelling, tenderness, status post surgery, on dressing, clean  Left shoulder clean dressing, SONY drain in place     Skin:     General: Skin is warm and dry.   Neurological:      Mental Status: She is alert and oriented to person, place, and time. Mental status is at baseline.   Psychiatric:         Mood and Affect: Mood normal.         Behavior: Behavior normal.         Thought Content: Thought content normal.         Fluids    Intake/Output Summary (Last 24 hours) at 8/2/2022 1355  Last data filed at 8/1/2022 2000  Gross per 24 hour   Intake 150 ml   Output 0 ml   Net 150 ml       Laboratory  Recent Labs     07/31/22  0430 08/01/22  0241   WBC 4.0* 3.9*   RBC 2.59* 2.46*   HEMOGLOBIN 7.2* 7.0*   HEMATOCRIT 22.1* 21.2*   MCV 85.3 86.2   MCH 27.8 28.5   MCHC 32.6* 33.0*   RDW 46.5 47.3   PLATELETCT 126* 133*   MPV 9.5 9.9     Recent Labs     08/01/22  0241   SODIUM 135   POTASSIUM 3.5*   CHLORIDE 110   CO2 17*   GLUCOSE 98   BUN 12   CREATININE 0.54   CALCIUM 6.7*                   Imaging  DX-HAND 2- RIGHT   Final Result      1. Interval development of osteopenia.   2. Marked erosive changes involving the right wrist and right fingers, most compatible with erosive osteoarthritis. Involvement is  greatest involving the right first carpometacarpal joint.      US-EXTREMITY VENOUS UPPER UNILAT LEFT   Final Result      IR-PICC LINE PLACEMENT W/ GUIDANCE > AGE 5   Final Result                  Ultrasound-guided PICC placement performed by qualified nursing staff as    above.          DX-CHEST-FOR LINE PLACEMENT Perform procedure in: Patient's Room   Final Result      1.  Peripherally inserted catheter has been placed and the tip projects appropriately over the superior vena cava.   2.  Stable enlargement of the cardiomediastinal silhouette.   3.  Stable patchy bilateral interstitial opacities.      DX-KNEE 2- LEFT   Final Result      Status post removal of the tibial component of the left knee arthroplasty      MR-SHOULDER-W/O LEFT   Final Result      1.  Large joint effusion with a large amount of fluid seen extending into the subacromial/subdeltoid bursa with synovitis and small low signal foci within the bursa consistent with pockets of gas. These findings are very suspicious for septic arthritis    and septic bursitis.      2.  Complete full-thickness tear of the supraspinatus tendon with retraction to the bony glenoid.      3.  Full-thickness tear portion of the subscapularis tendon.      4.  Partial-thickness tear of the articular surface fibers of the infraspinatus tendon.      5.  Prominent muscle edema and atrophy involving the rotator cuff as well as all the muscles surrounding the shoulder which may represent myositis.      6.  Osteoarthritis of the glenohumeral joint.      7.  Diffuse tearing of the glenoid labrum.      8.  Subluxation of the long head of the biceps tendon medially from the bicipital groove with marked thinning of the intra-articular portion consistent with tendinosis.      9.  Extensive soft tissue edema to include fat and muscle involving all of the imaged structures around the shoulder likely representing cellulitis.      10.  Widening of the acromioclavicular joint with  fragmentation of the distal clavicle consistent with old posttraumatic change versus surgical change.      II-MFIERYB-3 VIEW   Final Result      Nonobstructive bowel gas pattern.      EC-ECHOCARDIOGRAM COMPLETE W/O CONT   Final Result      DX-CHEST-PORTABLE (1 VIEW)   Final Result      1.  Interstitial infiltrates versus edema.   2.  Cardiomegaly.      DX-KNEE 2- RIGHT   Final Result      1.  Negative for fracture or malalignment      2.  Right knee arthroplasty appears within normal limits      DX-KNEE 2- LEFT   Final Result      1.  New lucency adjacent to the tibial component of arthroplasty suspicious for loosening or infection      2.  No other finding      US-EXTREMITY VENOUS UPPER UNILAT LEFT   Final Result      US-RENAL   Final Result      1.  Unremarkable kidneys.   2.  Limited evaluation of the bladder.      CT-ABDOMEN-PELVIS W/O   Final Result         Limited exam due to lack of IV contrast.         1. Mild diffuse wall thickening of the urinary bladder could relate to infection or inflammation. Correlate with UA. No hydronephrosis.   2. Nonspecific mildly prominent fluid-filled proximal small bowel in the left abdomen. This could be seen with enteritis, focal ileus or early obstruction.   3. Cirrhotic liver with portal hypertension and splenomegaly. Wall thickening of the ascending colon could relate to portal hypertension.   4. No ascites.      DX-SHOULDER 2+ LEFT   Final Result      1.  No evidence of acute fracture or dislocation.      2.  Old posttraumatic changes of the distal clavicle.      DX-CHEST-PORTABLE (1 VIEW)   Final Result      Cardiomegaly.           Assessment/Plan  * Bacteremia- (present on admission)  Assessment & Plan  2 bottles with group B strep   Patient reported history of severe bacteremia a year ago after knee surgery last year, which required skin grafting of both lower extremity and was hospitalized at [place] when she visited her son there  Repeat blood cultures NTD    Cont  Rocephin  Check TTE negative for vegetation  ID rec cont daptomycin daily end 8/30/2022 and then follow up in ID clinic at the end of antibiotics course. Need potential aspiration of the knee to ensure no infection prior to replacing the hardware.       Protein-calorie malnutrition, severe (HCC)  Assessment & Plan  Albumin 1.6.  Patient has prolonged chronic infections on antibiotics.  She also has a history of hepatitis C which was treated as well as cirrhosis of the liver from alcohol abuse.  Place patient on boost plus 3 times daily for albumin 1.6.  Ongoing wound VAC.      Vitamin D deficiency  Assessment & Plan  Vitamin D level 9,  Started 50,000 units weekly    Erosive osteoarthritis of right hand  Assessment & Plan  Noted on xray 7/28.  vitamin D level 9 - will start high dose replacement    Coagulopathy (HCC)  Assessment & Plan  INR 2.25.  H/o alcoholic cirrhosis of liver and Hepatitis C  7/28 dc'd eliquis for afib since requiring blood transfusion for Hg 6.8.    Hepatitis C infection  Assessment & Plan  Patient states she has been treated in [place] previously.      Drug-induced pancytopenia (HCC)  Assessment & Plan  Secondary to Rocephin.  7/26:  Changed rocephin to daptomycin IV.  Check daily CBC.  CPK and CBC weekly while on daptomycin.    Septic arthritis of knee, left (HCC)  Assessment & Plan  History of left knee replacement 2017, which was complicated with infection 2 weeks later  Patient had recurrent left knee infection last year which was complicated with bacteremia and she was hospitalized at the [place] hospital when she visited her son     Xray notes New lucency adjacent to the tibial component of arthroplasty suspicious for loosening or infection.   Orthopedics consulted, s/p L knee arthrocentesis 7/16 c/w infection.   s/p L knee resection and antibiotic spacer 7/19  ID rec cont daptomycin IV daily end 8/30/2022 and then follow up in ID clinic at the end of antibiotics course. Need  potential aspiration of the knee to ensure no infection prior to replacing the hardware.   TTWB LLE per ortho.    Septic arthritis of shoulder, left (HCC)- (present on admission)  Assessment & Plan  MRI left shoulder showed septic arthritis/subdeltoid abscess    Ortho consulted,  s/p Left shoulder arthrotomy and lavage for infection with partial synovectomy. Left shoulder incision and drainage of the subdeltoid abscess 7/17.  SONY drain out 7/27.  fluid culture NTD  On daptomycin until 8/30.    Iron deficiency anemia  Assessment & Plan  Acute on chronic  Iron study consistent with iron deficiency  Start po iron supplements  Check FOBT given remote hx of gastric ulcer and abdominal pain. On Eliquis. On PPI.  Per bedside RN, stool does not appear melanotic  Likely 2/2 to Rocephin given pancytopenia.    Hyponatremia- (present on admission)  Assessment & Plan  123 on admission  High SG on UA: probable hypovolemic and alcohol use  Cont to monitor    Resolved    Acute cystitis without hematuria  Assessment & Plan  On Rocephin/now daptomycin for bacteremia    Metabolic encephalopathy- (present on admission)  Assessment & Plan  Likely multifactorial in conjunction with narcotic use  S/p Narcan treatment, improved  Start lacutlose  Cont to treat infection     resolved      Hyperlipidemia- (present on admission)  Assessment & Plan  Continue Lipitor    ELIZABETH (acute kidney injury) (HCC)- (present on admission)  Assessment & Plan  Likely secondary dehydration  4.7 on admission   Good UOP  DC IVFs  Cont to monitor  Renally dose medications as appropriate    Resolved  Following intermittently    Atrial fibrillation (HCC)- (present on admission)  Assessment & Plan  On apixaban and dronedarone as outpt (apixaban held currently due to hbg 7)  7/28:  dc'd eliquis since INR 2.25 from cirrhosis liver and transfusion needed for Hg 6.8.  Rate controlled     Cirrhosis (HCC)- (present on admission)  Assessment & Plan  Secondary to  ETOH  Continue lactulose  following    Hypertension- (present on admission)  Assessment & Plan  Resume the home losartan    Controlled type 2 diabetes mellitus with hyperglycemia, without long-term current use of insulin (HCC)- (present on admission)  Assessment & Plan  Sliding-scale insulin, diabetic diet  A1c 5.7%         VTE prophylaxis: SCDs/TEDs and therapeutic anticoagulation with Eliquis (on hold)    I have performed a physical exam and reviewed and updated ROS and Plan today (8/2/2022). In review of yesterday's note (8/1/2022), there are no changes except as documented above.

## 2022-08-02 NOTE — PROGRESS NOTES
"   Orthopaedic Progress Note    Interval changes:  Patient doing well  Staples out from left shoulder today  Staples to be removed thurs from L knee  Hinge knee brace to remain unlocked   Prevena vac dressing in place to LUE without leak, LUE dressings CDI     ROS - Patient denies any new issues.  Pain well controlled.    /66   Pulse 70   Temp 36.6 °C (97.9 °F) (Temporal)   Resp 18   Ht 1.715 m (5' 7.5\")   Wt 95.1 kg (209 lb 10.5 oz)   SpO2 97%       Patient seen and examined  No acute distress  Breathing non labored  RRR  LUE dressings and staple removed and seristrips placed with benzoin, DNVI, moves all fingers, cap refill <2 sec.  LLE vac dressing CDI without leak, DNVI, moves all toes, cap refill <2 sec.     Recent Labs     07/31/22  0430 08/01/22  0241   WBC 4.0* 3.9*   RBC 2.59* 2.46*   HEMOGLOBIN 7.2* 7.0*   HEMATOCRIT 22.1* 21.2*   MCV 85.3 86.2   MCH 27.8 28.5   MCHC 32.6* 33.0*   RDW 46.5 47.3   PLATELETCT 126* 133*   MPV 9.5 9.9       Active Hospital Problems    Diagnosis    • Hypertension [I10]      Priority: Medium   • Controlled type 2 diabetes mellitus with hyperglycemia, without long-term current use of insulin (HCC) [E11.65]      Priority: Medium   • Cirrhosis (HCC) [K74.60]      Priority: Low   • Protein-calorie malnutrition, severe (HCC) [E43]    • Vitamin D deficiency [E55.9]    • Erosive osteoarthritis of right hand [M15.4]    • Hepatitis C infection [B19.20]    • Coagulopathy (HCC) [D68.9]    • Drug-induced pancytopenia (HCC) [D61.811]    • Septic arthritis of knee, left (HCC) [M00.9]    • Septic arthritis of shoulder, left (HCC) [M00.9]    • Iron deficiency anemia [D50.9]    • Acute cystitis without hematuria [N30.00]    • Bacteremia [R78.81]    • Hyponatremia [E87.1]    • ELIZABETH (acute kidney injury) (HCC) [N17.9]    • Hyperlipidemia [E78.5]    • Metabolic encephalopathy [G93.41]    • Atrial fibrillation (HCC) [I48.91]        Assessment/Plan:  Patient doing well  POD#13 S/P  1. left " total knee arthroplasty explantation  2. left knee insertion of articulating spacer   3. left knee application of incisional wound vacuum   POD#15 S/P   1.  Left shoulder arthrotomy and lavage for infection with partial synovectomy.  2.  Left shoulder incision and drainage of the subdeltoid abscess.  Wt bearing status - WBAT  Wound care/Drains - Dressings to be left in place  Future Procedures - none planned   Sutures/Staples out- L knee in 2 days  PT/OT-initiated  Antibiotics: dapto 570mg IV qd  DVT Prophylaxis- TEDS/SCDs/Foot pumps  Amaro-none  Case Coordination for Discharge Planning - Disposition pending abx needs

## 2022-08-03 LAB
GLUCOSE BLD STRIP.AUTO-MCNC: 101 MG/DL (ref 65–99)
GLUCOSE BLD STRIP.AUTO-MCNC: 135 MG/DL (ref 65–99)
GLUCOSE BLD STRIP.AUTO-MCNC: 138 MG/DL (ref 65–99)
GLUCOSE BLD STRIP.AUTO-MCNC: 206 MG/DL (ref 65–99)

## 2022-08-03 PROCEDURE — 700102 HCHG RX REV CODE 250 W/ 637 OVERRIDE(OP): Performed by: STUDENT IN AN ORGANIZED HEALTH CARE EDUCATION/TRAINING PROGRAM

## 2022-08-03 PROCEDURE — A9270 NON-COVERED ITEM OR SERVICE: HCPCS | Performed by: STUDENT IN AN ORGANIZED HEALTH CARE EDUCATION/TRAINING PROGRAM

## 2022-08-03 PROCEDURE — A9270 NON-COVERED ITEM OR SERVICE: HCPCS

## 2022-08-03 PROCEDURE — 82962 GLUCOSE BLOOD TEST: CPT | Mod: 91

## 2022-08-03 PROCEDURE — 700102 HCHG RX REV CODE 250 W/ 637 OVERRIDE(OP)

## 2022-08-03 PROCEDURE — 700105 HCHG RX REV CODE 258: Performed by: HOSPITALIST

## 2022-08-03 PROCEDURE — 97110 THERAPEUTIC EXERCISES: CPT

## 2022-08-03 PROCEDURE — 700111 HCHG RX REV CODE 636 W/ 250 OVERRIDE (IP): Performed by: HOSPITALIST

## 2022-08-03 PROCEDURE — 770001 HCHG ROOM/CARE - MED/SURG/GYN PRIV*

## 2022-08-03 PROCEDURE — A9270 NON-COVERED ITEM OR SERVICE: HCPCS | Performed by: HOSPITALIST

## 2022-08-03 PROCEDURE — 700102 HCHG RX REV CODE 250 W/ 637 OVERRIDE(OP): Performed by: HOSPITALIST

## 2022-08-03 PROCEDURE — 99232 SBSQ HOSP IP/OBS MODERATE 35: CPT | Performed by: HOSPITALIST

## 2022-08-03 PROCEDURE — 97535 SELF CARE MNGMENT TRAINING: CPT

## 2022-08-03 PROCEDURE — 97530 THERAPEUTIC ACTIVITIES: CPT

## 2022-08-03 PROCEDURE — 700101 HCHG RX REV CODE 250

## 2022-08-03 RX ADMIN — FERROUS SULFATE TAB 325 MG (65 MG ELEMENTAL FE) 325 MG: 325 (65 FE) TAB at 08:36

## 2022-08-03 RX ADMIN — ACETAMINOPHEN 650 MG: 325 TABLET, FILM COATED ORAL at 14:31

## 2022-08-03 RX ADMIN — DRONEDARONE 400 MG: 400 TABLET, FILM COATED ORAL at 18:14

## 2022-08-03 RX ADMIN — CALCIUM CARBONATE 500 MG: 500 TABLET, CHEWABLE ORAL at 08:38

## 2022-08-03 RX ADMIN — ATORVASTATIN CALCIUM 20 MG: 20 TABLET, FILM COATED ORAL at 20:37

## 2022-08-03 RX ADMIN — LACTULOSE 15 ML: 20 SOLUTION ORAL at 05:10

## 2022-08-03 RX ADMIN — CALCIUM CARBONATE 500 MG: 500 TABLET, CHEWABLE ORAL at 14:31

## 2022-08-03 RX ADMIN — DAPTOMYCIN 570 MG: 500 INJECTION, POWDER, LYOPHILIZED, FOR SOLUTION INTRAVENOUS at 05:10

## 2022-08-03 RX ADMIN — OXYCODONE HYDROCHLORIDE AND ACETAMINOPHEN 500 MG: 500 TABLET ORAL at 05:09

## 2022-08-03 RX ADMIN — LIDOCAINE 1 PATCH: 700 PATCH TOPICAL at 03:08

## 2022-08-03 RX ADMIN — OMEPRAZOLE 20 MG: 20 CAPSULE, DELAYED RELEASE ORAL at 18:14

## 2022-08-03 RX ADMIN — OXYCODONE HYDROCHLORIDE 10 MG: 10 TABLET ORAL at 10:29

## 2022-08-03 RX ADMIN — ACETAMINOPHEN 650 MG: 325 TABLET, FILM COATED ORAL at 21:34

## 2022-08-03 RX ADMIN — INSULIN HUMAN 6 UNITS: 100 INJECTION, SOLUTION PARENTERAL at 17:00

## 2022-08-03 RX ADMIN — CALCIUM CARBONATE 500 MG: 500 TABLET, CHEWABLE ORAL at 18:23

## 2022-08-03 RX ADMIN — OXYCODONE HYDROCHLORIDE 10 MG: 10 TABLET ORAL at 18:23

## 2022-08-03 RX ADMIN — DRONEDARONE 400 MG: 400 TABLET, FILM COATED ORAL at 08:35

## 2022-08-03 RX ADMIN — OMEPRAZOLE 20 MG: 20 CAPSULE, DELAYED RELEASE ORAL at 05:09

## 2022-08-03 RX ADMIN — CALCIUM CARBONATE 500 MG: 500 TABLET, CHEWABLE ORAL at 21:34

## 2022-08-03 RX ADMIN — ACETAMINOPHEN 650 MG: 325 TABLET, FILM COATED ORAL at 03:14

## 2022-08-03 ASSESSMENT — COGNITIVE AND FUNCTIONAL STATUS - GENERAL
EATING MEALS: A LITTLE
PERSONAL GROOMING: A LITTLE
STANDING UP FROM CHAIR USING ARMS: TOTAL
DAILY ACTIVITIY SCORE: 14
MOBILITY SCORE: 7
TOILETING: A LOT
DRESSING REGULAR UPPER BODY CLOTHING: A LOT
DRESSING REGULAR LOWER BODY CLOTHING: A LOT
TURNING FROM BACK TO SIDE WHILE IN FLAT BAD: A LOT
HELP NEEDED FOR BATHING: A LOT
MOVING TO AND FROM BED TO CHAIR: UNABLE
MOVING FROM LYING ON BACK TO SITTING ON SIDE OF FLAT BED: UNABLE
WALKING IN HOSPITAL ROOM: TOTAL
SUGGESTED CMS G CODE MODIFIER MOBILITY: CM
CLIMB 3 TO 5 STEPS WITH RAILING: TOTAL
SUGGESTED CMS G CODE MODIFIER DAILY ACTIVITY: CK

## 2022-08-03 ASSESSMENT — ENCOUNTER SYMPTOMS
DIZZINESS: 0
MYALGIAS: 0
EYES NEGATIVE: 1
BACK PAIN: 0
WEAKNESS: 0
WEIGHT LOSS: 0
DIAPHORESIS: 0
COUGH: 0
BLURRED VISION: 0
DEPRESSION: 0
CHILLS: 0
SHORTNESS OF BREATH: 0
DIARRHEA: 0
NEUROLOGICAL NEGATIVE: 1
CARDIOVASCULAR NEGATIVE: 1
HEADACHES: 0
PALPITATIONS: 0
VOMITING: 0
LOSS OF CONSCIOUSNESS: 0
PSYCHIATRIC NEGATIVE: 1
NAUSEA: 0
BRUISES/BLEEDS EASILY: 0
SORE THROAT: 0
DOUBLE VISION: 0
FOCAL WEAKNESS: 0
FEVER: 0
RESPIRATORY NEGATIVE: 1
ABDOMINAL PAIN: 1

## 2022-08-03 ASSESSMENT — LIFESTYLE VARIABLES: SUBSTANCE_ABUSE: 0

## 2022-08-03 ASSESSMENT — GAIT ASSESSMENTS: GAIT LEVEL OF ASSIST: UNABLE TO PARTICIPATE

## 2022-08-03 ASSESSMENT — PAIN SCALES - WONG BAKER: WONGBAKER_NUMERICALRESPONSE: HURTS JUST A LITTLE BIT

## 2022-08-03 ASSESSMENT — PAIN DESCRIPTION - PAIN TYPE
TYPE: CHRONIC PAIN
TYPE: ACUTE PAIN
TYPE: CHRONIC PAIN
TYPE: CHRONIC PAIN

## 2022-08-03 NOTE — THERAPY
Occupational Therapy  Daily Treatment     Patient Name: April Alicia  Age:  62 y.o., Sex:  female  Medical Record #: [id number]  Today's Date: 8/3/2022     Precautions: Fall Risk, Toe Touch Weight Bearing Left Lower Extremity, WBAT R/L UE  Comments: hinged knee brace at bedside NO ORDERS IN CHART    Assessment    Pt seen for OT tx session to progress functional participation in self cares and mobility. Pt progressing slowly with therapy but demonstrated slightly improved activity tolerance and standing tolerance for self cares. Pt required max A to don brace/socks, sup seated EOB for G/H, min A for STS at EOB. Will continue to follow for skilled OT services.     Plan    Continue current treatment plan.    DC Equipment Recommendations: (P) Unable to determine at this time  Discharge Recommendations: (P) Recommend post-acute placement for additional occupational therapy services prior to discharge home       08/03/22 1126   Cognition    Comments agreeable with encouaragement   Other Treatments   Other Treatments Provided increased LUE pain today with limited ability to tolerate ROM/WB   Balance   Sitting Balance (Static) Fair   Sitting Balance (Dynamic) Fair -   Standing Balance (Static) Poor +   Standing Balance (Dynamic) Poor   Weight Shift Sitting Poor   Weight Shift Standing Absent   Skilled Intervention Verbal Cuing;Tactile Cuing;Sequencing;Postural Facilitation   Bed Mobility    Sit to Supine Moderate Assist   Scooting Moderate Assist   Skilled Intervention Verbal Cuing   Activities of Daily Living   Grooming Supervision;Seated  (G/H seated EOB)   Lower Body Dressing Maximal Assist  (socks and brace)   Toileting Total Assist  (cleaning urination)   Skilled Intervention Verbal Cuing;Sequencing;Tactile Cuing   How much help from another person does the patient currently need...   6 Clicks Daily Activity Score 14   Functional Mobility   Sit to Stand Minimal Assist   Mobility STS x2 EOB   Patient / Family  Goals   Patient / Family Goal #1 to walk again   Short Term Goals   Short Term Goal # 1 Pt will tolerate seated ADLs for 10 min unsupported EOB   Goal Outcome # 1 Progressing as expected   Short Term Goal # 2 Pt will demo functional txfs min A   Goal Outcome # 2 Goal not met   Short Term Goal # 3 Pt will participate in HEP to increased BUE strength   Goal Outcome # 3 Progressing slower than expected   Education Group   Role of Occupational Therapist Patient Response Patient;Acceptance;Reinforcement Needed   Upper Ext ROM Patient Response Patient;Acceptance;Explanation   Anticipated Discharge Equipment and Recommendations   DC Equipment Recommendations Unable to determine at this time   Discharge Recommendations Recommend post-acute placement for additional occupational therapy services prior to discharge home

## 2022-08-03 NOTE — CARE PLAN
The patient is Stable - Low risk of patient condition declining or worsening    Shift Goals  Clinical Goals: pain management  Patient Goals: pain management  Family Goals: n/a    Progress made toward(s) clinical / shift goals:    Problem: Pain - Standard  Goal: Alleviation of pain or a reduction in pain to the patient’s comfort goal  Outcome: Progressing  Note: Pain tolerable with oxycodone and tylenol PRN.     Problem: Wound/ / Incision Healing  Goal: Patient's wound/surgical incision will decrease in size and heals properly  Outcome: Progressing  Note: L shoulder incision shows proper healing w/ no redness, swelling, or warm to touch.
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
## 2022-08-03 NOTE — DISCHARGE PLANNING
Case Management Discharge Planning    Admission Date: 7/10/2022  GMLOS: 7  ALOS: 24    6-Clicks ADL Score: 14  6-Clicks Mobility Score: 7  PT and/or OT Eval ordered: Yes  Post-acute Referrals Ordered: Yes  Post-acute Choice Obtained: Yes  Has referral(s) been sent to post-acute provider:  Yes      Anticipated Discharge Dispo: Discharge Disposition: D/T to Medicaid only Nursing home (64)    DME Needed: No    Action(s) Taken: LSW received update from Yamileth Escobar stating that [facility] might be able to take patient and asked LSW to resend referral to [facility]. Referral has been resent. L[facility] to [facility] tomorrow.     Escalations Completed: Long Length of Stay Committee     Medically Clear: Yes    Next Steps: f/u with SNFs    Barriers to Discharge: Pending Placement    Is the patient up for discharge tomorrow: yes

## 2022-08-03 NOTE — CARE PLAN
Problem: Pain - Standard  Goal: Alleviation of pain or a reduction in pain to the patient’s comfort goal  Outcome: Progressing   The patient is Stable - Low risk of patient condition declining or worsening    Shift Goals  Clinical Goals: Pain management  Patient Goals: Rest, pain mgmt  Family Goals: n/a    Progress made toward(s) clinical / shift goals:  Patient communicating pain level and asking for pain medication for PT.     Patient is not progressing towards the following goals: Patient states she is still having constant pain.

## 2022-08-03 NOTE — THERAPY
Physical Therapy   Daily Treatment     Patient Name: April Alicia  Age:  62 y.o., Sex:  female  Medical Record #: [id number]  Today's Date: 8/3/2022     Precautions  Precautions: Fall Risk;Toe Touch Weight Bearing Left Lower Extremity;Weight Bearing As Tolerated Left Upper Extremity  Comments: hinged knee brace (no orders)    Assessment    The pt presents today agreeable to participate in tx session.  She demo'd bed mobility modA for LLE positioning and trunk support w/ HOB flat and no bed rails.  The pt performed STS x2 eob w/ BUE support and modA x2 person for extension and stability, while the pt adequately maintained LLE TTWB throughout.  Instructed pt on seated therapeutic exercise w/ good demonstration of each.  Recommend placement given pt's inability to care for self and high risk of future falls.  Will follow.      Plan    Continue current treatment plan.    DC Equipment Recommendations: Unable to determine at this time  Discharge Recommendations: Recommend post-acute placement for additional physical therapy services prior to discharge home      Subjective/Objective       08/03/22 1119   Cognition    Cognition / Consciousness WDL   Level of Consciousness Alert   Comments pt pleasant and cooperative   Passive ROM Lower Body   Passive ROM Lower Body X   Comments LLE knee immobilized in brace locked at 0deg flex/ext, otherwise BLE grossly WFL   Active ROM Lower Body    Active ROM Lower Body  X   Comments as above   Strength Lower Body   Lower Body Strength  X   Comments LLE not formally assessed, however limited hip flexion/adduction noted during bed mobility; RLE grossly WFL   Sensation Lower Body   Lower Extremity Sensation   WDL   Comments sensation intact to LT/DP BLE   Sitting Lower Body Exercises   Sitting Lower Body Exercises Yes   Ankle Pumps 1 set of 10;Bilateral   Hip Abduction 1 set of 10;Bilateral  (mod manual resistance, 3s holds)   Long Arc Quad 1 set of 10;Right   Marching 1 set of  10;Reciprocal   Neuro-Muscular Treatments   Neuro-Muscular Treatments Anterior weight shift;Weight Shift Right;Weight Shift Left   Comments stand w/ BUE support and maxA   Other Treatments   Other Treatments Provided Bed mobility, STS, functional endurance, pt seen in collaboration w/ 2nd skilled clinician to facilitate balance tasks during completion of ADL's   Balance   Sitting Balance (Static) Fair   Sitting Balance (Dynamic) Fair   Standing Balance (Static) Poor -   Standing Balance (Dynamic) Trace +   Weight Shift Sitting Fair   Weight Shift Standing Absent   Skilled Intervention Verbal Cuing;Tactile Cuing;Facilitation;Compensatory Strategies   Comments stand w/ BUE support and 2 person assist   Gait Analysis   Gait Level Of Assist Unable to Participate   Weight Bearing Status TTWB LLE, WBAT LUE   Comments pt unable offload w/ LUE using AD 2/2 pain and weakness   Bed Mobility    Supine to Sit Moderate Assist   Scooting Moderate Assist   Skilled Intervention Verbal Cuing;Tactile Cuing;Sequencing;Facilitation;Compensatory Strategies   Comments hob flat, no bed rails   Functional Mobility   Sit to Stand Moderate Assist  (x2 person)   Transfer Method Stand Step   Mobility STSx2 EOB w/ BUE support   Skilled Intervention Verbal Cuing;Tactile Cuing;Sequencing;Postural Facilitation;Facilitation;Compensatory Strategies   Activity Tolerance   Sitting Edge of Bed 20min   Standing 2min total   Patient / Family Goals    Patient / Family Goal #1 walk again   Goal #1 Outcome Goal not met   Short Term Goals    Short Term Goal # 1 Patient will move supine<>sitting EOB without bed features with supervision within 6tx in order to get in/out of bed   Goal Outcome # 1 goal not met   Short Term Goal # 2 Patient will move sitting<>standing with supervision within 6tx in order to initiate transfers   Goal Outcome # 2 Goal not met   Short Term Goal # 3 Patient will self propel WC 50ft with supervision within 6tx in order to access  environment   Goal Outcome # 3 Goal not met   Short Term Goal # 4 Patient will ascend/descend FOS with supervision using depression lift/buttock bump up method within 6tx in order to access apartment   Goal Outcome # 4 Goal not met   Education Group   Education Provided Role of Physical Therapist;Exercises - Seated   Role of Physical Therapist Patient Response Patient;Acceptance;Explanation;Demonstration;Action Demonstration   Exercises - Seated Patient Response Patient;Acceptance;Explanation;Demonstration;Action Demonstration   Additional Comments pt receptive of edu provided   Interdisciplinary Plan of Care Collaboration   IDT Collaboration with  Nursing;Occupational Therapist   Patient Position at End of Therapy Seated;Edge of Bed;Tray Table within Reach;Call Light within Reach;Phone within Reach   Collaboration Comments regarding outcome of tx session   Session Information   Date / Session Number  8/3- 8 (2/3, 8/7)

## 2022-08-04 VITALS
TEMPERATURE: 98 F | HEART RATE: 62 BPM | BODY MASS INDEX: 31.78 KG/M2 | WEIGHT: 209.66 LBS | DIASTOLIC BLOOD PRESSURE: 70 MMHG | SYSTOLIC BLOOD PRESSURE: 122 MMHG | OXYGEN SATURATION: 97 % | HEIGHT: 68 IN | RESPIRATION RATE: 18 BRPM

## 2022-08-04 PROBLEM — N17.9 AKI (ACUTE KIDNEY INJURY) (HCC): Status: RESOLVED | Noted: 2022-07-10 | Resolved: 2022-08-04

## 2022-08-04 PROBLEM — N30.00 ACUTE CYSTITIS WITHOUT HEMATURIA: Status: RESOLVED | Noted: 2022-07-11 | Resolved: 2022-08-04

## 2022-08-04 PROBLEM — E87.1 HYPONATREMIA: Status: RESOLVED | Noted: 2022-07-11 | Resolved: 2022-08-04

## 2022-08-04 PROBLEM — G93.41 METABOLIC ENCEPHALOPATHY: Status: RESOLVED | Noted: 2022-07-10 | Resolved: 2022-08-04

## 2022-08-04 LAB
ALBUMIN SERPL BCP-MCNC: 1.6 G/DL (ref 3.2–4.9)
ALBUMIN/GLOB SERPL: 0.4 G/DL
ALP SERPL-CCNC: 192 U/L (ref 30–99)
ALT SERPL-CCNC: 30 U/L (ref 2–50)
ANION GAP SERPL CALC-SCNC: 6 MMOL/L (ref 7–16)
AST SERPL-CCNC: 30 U/L (ref 12–45)
BASOPHILS # BLD AUTO: 0.9 % (ref 0–1.8)
BASOPHILS # BLD: 0.04 K/UL (ref 0–0.12)
BILIRUB SERPL-MCNC: 0.4 MG/DL (ref 0.1–1.5)
BUN SERPL-MCNC: 16 MG/DL (ref 8–22)
CALCIUM SERPL-MCNC: 7.3 MG/DL (ref 8.5–10.5)
CHLORIDE SERPL-SCNC: 111 MMOL/L (ref 96–112)
CK SERPL-CCNC: 202 U/L (ref 0–154)
CO2 SERPL-SCNC: 18 MMOL/L (ref 20–33)
CREAT SERPL-MCNC: 0.64 MG/DL (ref 0.5–1.4)
EOSINOPHIL # BLD AUTO: 0.14 K/UL (ref 0–0.51)
EOSINOPHIL NFR BLD: 3.3 % (ref 0–6.9)
ERYTHROCYTE [DISTWIDTH] IN BLOOD BY AUTOMATED COUNT: 48.3 FL (ref 35.9–50)
GFR SERPLBLD CREATININE-BSD FMLA CKD-EPI: 100 ML/MIN/1.73 M 2
GLOBULIN SER CALC-MCNC: 3.7 G/DL (ref 1.9–3.5)
GLUCOSE BLD STRIP.AUTO-MCNC: 101 MG/DL (ref 65–99)
GLUCOSE BLD STRIP.AUTO-MCNC: 130 MG/DL (ref 65–99)
GLUCOSE BLD STRIP.AUTO-MCNC: 134 MG/DL (ref 65–99)
GLUCOSE BLD STRIP.AUTO-MCNC: 135 MG/DL (ref 65–99)
GLUCOSE BLD STRIP.AUTO-MCNC: 96 MG/DL (ref 65–99)
GLUCOSE SERPL-MCNC: 111 MG/DL (ref 65–99)
HCT VFR BLD AUTO: 22.9 % (ref 37–47)
HGB BLD-MCNC: 7.3 G/DL (ref 12–16)
IMM GRANULOCYTES # BLD AUTO: 0.04 K/UL (ref 0–0.11)
IMM GRANULOCYTES NFR BLD AUTO: 0.9 % (ref 0–0.9)
LYMPHOCYTES # BLD AUTO: 1.08 K/UL (ref 1–4.8)
LYMPHOCYTES NFR BLD: 25.5 % (ref 22–41)
MCH RBC QN AUTO: 27.7 PG (ref 27–33)
MCHC RBC AUTO-ENTMCNC: 31.9 G/DL (ref 33.6–35)
MCV RBC AUTO: 86.7 FL (ref 81.4–97.8)
MONOCYTES # BLD AUTO: 0.4 K/UL (ref 0–0.85)
MONOCYTES NFR BLD AUTO: 9.5 % (ref 0–13.4)
NEUTROPHILS # BLD AUTO: 2.53 K/UL (ref 2–7.15)
NEUTROPHILS NFR BLD: 59.9 % (ref 44–72)
NRBC # BLD AUTO: 0 K/UL
NRBC BLD-RTO: 0 /100 WBC
PLATELET # BLD AUTO: 145 K/UL (ref 164–446)
PMV BLD AUTO: 9.7 FL (ref 9–12.9)
POTASSIUM SERPL-SCNC: 4.1 MMOL/L (ref 3.6–5.5)
PROT SERPL-MCNC: 5.3 G/DL (ref 6–8.2)
RBC # BLD AUTO: 2.64 M/UL (ref 4.2–5.4)
SARS-COV+SARS-COV-2 AG RESP QL IA.RAPID: NOTDETECTED
SODIUM SERPL-SCNC: 135 MMOL/L (ref 135–145)
SPECIMEN SOURCE: NORMAL
WBC # BLD AUTO: 4.2 K/UL (ref 4.8–10.8)

## 2022-08-04 PROCEDURE — 700105 HCHG RX REV CODE 258: Performed by: HOSPITALIST

## 2022-08-04 PROCEDURE — 700101 HCHG RX REV CODE 250

## 2022-08-04 PROCEDURE — 700111 HCHG RX REV CODE 636 W/ 250 OVERRIDE (IP): Performed by: HOSPITALIST

## 2022-08-04 PROCEDURE — 80053 COMPREHEN METABOLIC PANEL: CPT

## 2022-08-04 PROCEDURE — A9270 NON-COVERED ITEM OR SERVICE: HCPCS | Performed by: STUDENT IN AN ORGANIZED HEALTH CARE EDUCATION/TRAINING PROGRAM

## 2022-08-04 PROCEDURE — A9270 NON-COVERED ITEM OR SERVICE: HCPCS | Performed by: HOSPITALIST

## 2022-08-04 PROCEDURE — 82550 ASSAY OF CK (CPK): CPT

## 2022-08-04 PROCEDURE — 700102 HCHG RX REV CODE 250 W/ 637 OVERRIDE(OP): Performed by: HOSPITALIST

## 2022-08-04 PROCEDURE — 99239 HOSP IP/OBS DSCHRG MGMT >30: CPT | Performed by: HOSPITALIST

## 2022-08-04 PROCEDURE — 99233 SBSQ HOSP IP/OBS HIGH 50: CPT | Performed by: INTERNAL MEDICINE

## 2022-08-04 PROCEDURE — 700102 HCHG RX REV CODE 250 W/ 637 OVERRIDE(OP): Performed by: STUDENT IN AN ORGANIZED HEALTH CARE EDUCATION/TRAINING PROGRAM

## 2022-08-04 PROCEDURE — 85025 COMPLETE CBC W/AUTO DIFF WBC: CPT

## 2022-08-04 PROCEDURE — 87426 SARSCOV CORONAVIRUS AG IA: CPT

## 2022-08-04 PROCEDURE — 82962 GLUCOSE BLOOD TEST: CPT | Mod: 91

## 2022-08-04 RX ORDER — ERGOCALCIFEROL 1.25 MG/1
50000 CAPSULE ORAL
Qty: 5 CAPSULE | Status: ON HOLD
Start: 2022-08-09 | End: 2022-12-27

## 2022-08-04 RX ORDER — LACTULOSE 20 G/30ML
15 SOLUTION ORAL DAILY
Qty: 450 ML | Status: ON HOLD
Start: 2022-08-05 | End: 2022-12-27

## 2022-08-04 RX ORDER — OMEPRAZOLE 20 MG/1
20 CAPSULE, DELAYED RELEASE ORAL EVERY 12 HOURS
Qty: 30 CAPSULE | Status: ON HOLD
Start: 2022-08-04 | End: 2022-12-27

## 2022-08-04 RX ORDER — LIDOCAINE 50 MG/G
1 PATCH TOPICAL EVERY 24 HOURS
Qty: 10 PATCH | Status: ON HOLD
Start: 2022-08-05 | End: 2023-01-10

## 2022-08-04 RX ORDER — CALCIUM CARBONATE 500 MG/1
500 TABLET, CHEWABLE ORAL 4 TIMES DAILY PRN
Qty: 30 TABLET | Status: ON HOLD
Start: 2022-08-04 | End: 2022-12-27

## 2022-08-04 RX ORDER — FERROUS SULFATE 325(65) MG
325 TABLET ORAL
Qty: 30 TABLET | Status: ON HOLD
Start: 2022-08-05 | End: 2022-12-27

## 2022-08-04 RX ORDER — ACETAMINOPHEN 325 MG/1
650 TABLET ORAL EVERY 6 HOURS PRN
Qty: 30 TABLET | Refills: 0 | Status: ON HOLD
Start: 2022-08-04 | End: 2022-12-27

## 2022-08-04 RX ORDER — ASCORBIC ACID 500 MG
500 TABLET ORAL DAILY
Qty: 30 TABLET | Status: ON HOLD
Start: 2022-08-05 | End: 2023-01-10

## 2022-08-04 RX ORDER — LOPERAMIDE HYDROCHLORIDE 2 MG/1
2 CAPSULE ORAL 4 TIMES DAILY PRN
Qty: 30 CAPSULE | Status: ON HOLD
Start: 2022-08-04 | End: 2022-12-27

## 2022-08-04 RX ORDER — CEFAZOLIN SODIUM 2 G/100ML
2 INJECTION, SOLUTION INTRAVENOUS EVERY 8 HOURS
Status: DISCONTINUED | OUTPATIENT
Start: 2022-08-04 | End: 2022-08-04

## 2022-08-04 RX ADMIN — LACTULOSE 15 ML: 20 SOLUTION ORAL at 05:39

## 2022-08-04 RX ADMIN — ACETAMINOPHEN 650 MG: 325 TABLET, FILM COATED ORAL at 06:24

## 2022-08-04 RX ADMIN — OXYCODONE HYDROCHLORIDE 10 MG: 10 TABLET ORAL at 10:33

## 2022-08-04 RX ADMIN — DAPTOMYCIN 570 MG: 500 INJECTION, POWDER, LYOPHILIZED, FOR SOLUTION INTRAVENOUS at 05:39

## 2022-08-04 RX ADMIN — OXYCODONE HYDROCHLORIDE AND ACETAMINOPHEN 500 MG: 500 TABLET ORAL at 05:39

## 2022-08-04 RX ADMIN — OXYCODONE HYDROCHLORIDE 10 MG: 10 TABLET ORAL at 01:07

## 2022-08-04 RX ADMIN — LIDOCAINE 1 PATCH: 700 PATCH TOPICAL at 01:07

## 2022-08-04 RX ADMIN — FERROUS SULFATE TAB 325 MG (65 MG ELEMENTAL FE) 325 MG: 325 (65 FE) TAB at 08:20

## 2022-08-04 RX ADMIN — DRONEDARONE 400 MG: 400 TABLET, FILM COATED ORAL at 08:20

## 2022-08-04 RX ADMIN — OMEPRAZOLE 20 MG: 20 CAPSULE, DELAYED RELEASE ORAL at 05:39

## 2022-08-04 ASSESSMENT — ENCOUNTER SYMPTOMS
ABDOMINAL PAIN: 0
DIARRHEA: 0
SPUTUM PRODUCTION: 0
NAUSEA: 0
NERVOUS/ANXIOUS: 0
MYALGIAS: 0
SHORTNESS OF BREATH: 0
VOMITING: 0
CONSTIPATION: 0
COUGH: 0

## 2022-08-04 ASSESSMENT — PAIN DESCRIPTION - PAIN TYPE
TYPE: CHRONIC PAIN

## 2022-08-04 NOTE — DISCHARGE SUMMARY
Discharge Summary    CHIEF COMPLAINT ON ADMISSION  Chief Complaint   Patient presents with   • Hypotension   • Fatigue   • Arm Pain       Reason for Admission  ems    Admission Date  7/10/2022     CODE STATUS  Full Code    HPI & HOSPITAL COURSE  Patient is a 62 year old female with history of cocaine and ETOH abuse (though sober x 8 years per her report), cirrhosis, asthma, AFib on apixaban, DM, and HTN. She presented to [facility] on 7/10/22 after reportedly taking an unknown narcotic. She was found to be minimally responsive and in the ER, she responded to narcan. Labs revealed a Na 121, Cl 89, Creat 5.35, CXR showing cardiomegally.  She was admitted to the floor with AMS, Hyponatremia, dehydration,and sepsis from urinary source.  Subsequent blood cultures + Strep Species     Bacteremia with group B streptococcal bacteremia was noted and. ID was consulted. Repeat blood culture were negative and  TTE showed  EF of 60%, negative for vegetation.      Her acute kidney injury resolved    She reported acute on chronic left knee pain and an Xray revealed a lucency adjacent to the tibial component of arthroplasty suspicious for loosening or infection. Orthopedics was consulted. She is status post a L knee arthrocentesis on 7/16 which was consistent with an infection. She then underwent a L knee revision, total arthroplasty with antibiotic spacer and wound vac placement on 7/19.                MRI left shoulder showed possible septic arthritis/bursitis. s/p Left shoulder arthrotomy and lavage for infection with partial   Synovectomy. Left shoulder incision and drainage of the subdeltoid abscess 7/17.     ID recommended that  cont ceftriaxone 2gm daily end 8/30/2022 and then follow up in ID clinic at the end of antibiotics course. Need potential aspiration of the knee to ensure no infection prior to replacing the hardware. Plan is to continue 2 grams of ceftriaxone daily through 8/30/22 and then follow up in the ID clinic after  completing these antibiotics. After that she will follow up with Dr. Gray of [facility].    Her pancytopenia will also require ongoing monitoring, currently we are checking labs twice a week. Her chronic eliquis was held due to anemia, with hgb in the high 6's, now stable in the 7 range, can consider restarting eliquis if hbg continues to improve.     Therefore, she is discharged in guarded and stable condition to home with organized home healthcare and close outpatient follow-up.    The patient met 2-midnight criteria for an inpatient stay at the time of discharge.      FOLLOW UP ITEMS POST DISCHARGE  [facility] ID clinic after 8/30/22  Dr. Marina dodge after 8/30/22    DISCHARGE DIAGNOSES  Principal Problem:    Bacteremia POA: Yes  Active Problems:    Controlled type 2 diabetes mellitus with hyperglycemia, without long-term current use of insulin (HCC) (Chronic) POA: Yes    Hypertension (Chronic) POA: Yes    Cirrhosis (HCC) (Chronic) POA: Yes    Atrial fibrillation (HCC) POA: Yes    Hyperlipidemia POA: Yes    Iron deficiency anemia POA: Unknown    Septic arthritis of shoulder, left (HCC) POA: Yes    Septic arthritis of knee, left (HCC) POA: Unknown    Drug-induced pancytopenia (HCC) POA: No    Hepatitis C infection POA: Unknown    Coagulopathy (HCC) POA: Unknown    Erosive osteoarthritis of right hand POA: Unknown    Vitamin D deficiency POA: Unknown    Protein-calorie malnutrition, severe (HCC) POA: Unknown  Resolved Problems:    ELIZABETH (acute kidney injury) (HCC) POA: Yes    Metabolic encephalopathy POA: Yes    Acute cystitis without hematuria POA: Unknown    Hyponatremia POA: Yes    Left knee pain POA: Unknown      FOLLOW UP  No future appointments.  VENICE Curtis[facility]CErrol  [address] [facility] [address]  [phone]    Go on 8/19/2022  Please attend a primary care appointment at 2pm.      MEDICATIONS ON DISCHARGE     Medication List      START taking these medications      Instructions   acetaminophen 325 MG  Tabs  Commonly known as: Tylenol   Take 2 Tablets by mouth every 6 hours as needed for Fever or Moderate Pain.  Dose: 650 mg     ascorbic acid 500 MG tablet  Start taking on: August 5, 2022  Commonly known as: Vitamin C   Take 1 Tablet by mouth every day.  Dose: 500 mg     calcium carbonate 500 MG Chew  Commonly known as: Tums   Chew 1 Tablet 4 times a day as needed (Heartburn).  Dose: 500 mg     cefTRIAXone 2 g/20 mL   Start taking on: August 5, 2022  Commonly known as: Rocephin   Infuse 20 mL into a venous catheter every 24 hours for 25 days.  Dose: 2 g     ferrous sulfate 325 (65 Fe) MG tablet  Start taking on: August 5, 2022   Take 1 Tablet by mouth every morning with breakfast.  Dose: 325 mg     insulin regular 100 Unit/mL Soln  Commonly known as: HumuLIN R   Inject 3-15 Units under the skin 4 Times a Day,Before Meals and at Bedtime.  Dose: 3-15 Units     lactulose 20 GM/30ML Soln  Start taking on: August 5, 2022   Take 15 mL by mouth every day.  Dose: 15 mL     lidocaine 5 % Ptch  Start taking on: August 5, 2022  Commonly known as: LIDODERM   Place 1 Patch on the skin every 24 hours.  Dose: 1 Patch     loperamide 2 MG Caps  Commonly known as: IMODIUM   Take 1 Capsule by mouth 4 times a day as needed for Diarrhea.  Dose: 2 mg     omeprazole 20 MG delayed-release capsule  Commonly known as: PRILOSEC   Take 1 Capsule by mouth every 12 hours.  Dose: 20 mg     vitamin D2 (Ergocalciferol) 1.25 MG (11209 UT) Caps capsule  Start taking on: August 9, 2022  Commonly known as: Drisdol   Take 1 Capsule by mouth every 7 days.  Dose: 50,000 Units        CONTINUE taking these medications      Instructions   albuterol 108 (90 Base) MCG/ACT Aers inhalation aerosol   Inhale 2 Puffs every four hours as needed for Shortness of Breath.  Dose: 2 Puff     atorvastatin 20 MG Tabs  Commonly known as: LIPITOR   Take 1 Tablet by mouth every evening.  Dose: 20 mg     docusate sodium 100 MG Caps   Take 100 mg by mouth 2 times a  day.  Dose: 100 mg     felodipine ER 5 MG Tb24  Commonly known as: Plendil   Take 5 mg by mouth every evening.  Dose: 5 mg     furosemide 20 MG Tabs  Commonly known as: LASIX   Take 1 Tablet by mouth every day.  Dose: 20 mg     gabapentin 400 MG Caps  Commonly known as: NEURONTIN   Take 400 mg by mouth 2 times a day.  Dose: 400 mg     losartan 100 MG Tabs  Commonly known as: COZAAR   Take 100 mg by mouth every day.  Dose: 100 mg     metFORMIN  MG Tb24  Commonly known as: GLUCOPHAGE XR   Take 500 mg by mouth every evening.  Dose: 500 mg     Multaq 400 MG Tabs  Generic drug: dronedarone   Take 400 mg by mouth 2 times a day, with meals.  Dose: 400 mg     pantoprazole 40 MG Tbec  Commonly known as: PROTONIX   Take 40 mg by mouth every day.  Dose: 40 mg     potassium Chloride ER 20 MEQ Tbcr tablet  Commonly known as: K-TAB   Take 1 Tablet by mouth every day.  Dose: 20 mEq        STOP taking these medications    apixaban 5mg Tabs  Commonly known as: ELIQUIS            Allergies  Allergies   Allergen Reactions   • Nkda [No Known Drug Allergy]        DIET  Orders Placed This Encounter   Procedures   • Diet Order Diet: Consistent CHO (Diabetic)     Standing Status:   Standing     Number of Occurrences:   1     Order Specific Question:   Diet:     Answer:   Consistent CHO (Diabetic) [4]       ACTIVITY  As tolerated and directed by skilled nursing.  Weight bearing as tolerated    LINES, DRAINS, AND WOUNDS  This is an automated list. Peripheral IVs will be removed prior to discharge.  PICC Single Lumen 07/20/22 Right Basilic (Active)   Site Assessment Clean;Dry;Intact 08/04/22 0733   Line Status Scrubbed the hub prior to access;Saline locked 08/04/22 0733   Line Secured at (cm) 0 cm 07/20/22 1100   Extremity Circumference (cm) 34.5 cm 07/20/22 1100   Dressing Type Transparent;Biopatch 08/04/22 0733   Dressing Status Clean;Intact;Dry 08/04/22 0733   Dressing Intervention N/A 08/02/22 1915   Dressing Change Due 08/06/22  08/04/22 0733   Date Primary Tubing Changed 07/30/22 07/30/22 2100   Date IV Connector(s) Changed 07/23/22 07/23/22 2230   NEXT Primary Tubing Change  08/06/22 07/31/22 0900   NEXT IV Connector(s) Change 07/31/22 07/30/22 2100   Line Necessity Assessed Antibiotic Therapy Greater than 7 Days 07/30/22 2100   $ Single Lumen PICC Charge Single kit used 07/20/22 1100       Wound 08/24/20 Incision Knee Left aquacel, ace bandage (Active)       Wound 09/14/20 Incision Knee Left (Active)       Wound 07/17/22 Incision Arm Left STAPLES, MEPILEX AG (Active)   Site Assessment Dry;Intact;Clean 08/04/22 0733   Periwound Assessment Clean;Dry;Intact 08/04/22 0733   Margins EDMUND 08/03/22 0906   Closure Staples 08/01/22 2000   Drainage Amount None 08/02/22 1915   Drainage Description EDMUND 07/27/22 2000   Dressing Options Steri-Strip 08/02/22 1915   Dressing Changed Observed 08/02/22 1915   Dressing Status Open to Air 08/04/22 0733       Wound 07/25/22 Other (comment) Coccyx;Perineum;Labia Bilateral moisture associated breakdown/IAD (Active)   Wound Image   07/25/22 2000   Site Assessment Red;Excoriated 08/04/22 0733   Periwound Assessment Maceration;Excoriated;Red 08/04/22 0733   Closure Open to air 08/03/22 2000   Drainage Amount None 08/03/22 0906   Treatments Cleansed 08/02/22 1915   Wound Cleansing Foam Cleanser/Washcloth 08/02/22 1915   Periwound Protectant Barrier Paste;Moisture Barrier 08/04/22 0733   Dressing Cleansing/Solutions Not Applicable 07/29/22 2130   Dressing Options Mepilex 08/04/22 0733   Dressing Changed Observed 08/04/22 0733   Dressing Status Clean;Dry;Intact 08/04/22 0733       Wound 07/28/22 Incision Knee Anterior;Midline;Mid Left Surgical site (Active)   Site Assessment EDMUND 08/04/22 0733   Periwound Assessment EDMUND 08/04/22 0733   Margins EDMUND 08/02/22 1915   Closure Bovey 07/28/22 2000   Drainage Amount None 07/28/22 2000   Drainage Description Serosanguineous 08/02/22 1915   Treatments Cleansed 07/28/22 1300    Wound Cleansing Normal Saline Irrigation 07/28/22 1300   Dressing Options Hydrofiber Silver;Hydrocolloid Thick 07/29/22 2130   Dressing Changed Observed 08/02/22 1915   Dressing Status Clean;Dry;Intact 08/04/22 0733   Dressing Change/Treatment Frequency As Needed 07/31/22 0900      PICC Single Lumen 07/20/22 Right Basilic (Active)   Site Assessment Clean;Dry;Intact 08/04/22 0733   Line Status Scrubbed the hub prior to access;Saline locked 08/04/22 0733   Line Secured at (cm) 0 cm 07/20/22 1100   Extremity Circumference (cm) 34.5 cm 07/20/22 1100   Dressing Type Transparent;Biopatch 08/04/22 0733   Dressing Status Clean;Intact;Dry 08/04/22 0733   Dressing Intervention N/A 08/02/22 1915   Dressing Change Due 08/06/22 08/04/22 0733   Date Primary Tubing Changed 07/30/22 07/30/22 2100   Date IV Connector(s) Changed 07/23/22 07/23/22 2230   NEXT Primary Tubing Change  08/06/22 07/31/22 0900   NEXT IV Connector(s) Change 07/31/22 07/30/22 2100   Line Necessity Assessed Antibiotic Therapy Greater than 7 Days 07/30/22 2100   $ Single Lumen PICC Charge Single kit used 07/20/22 1100                MENTAL STATUS ON TRANSFER   axox3       CONSULTATIONS  [facility] ID    PROCEDURES  DX-HAND 2- RIGHT   Final Result      1. Interval development of osteopenia.   2. Marked erosive changes involving the right wrist and right fingers, most compatible with erosive osteoarthritis. Involvement is greatest involving the right first carpometacarpal joint.      US-EXTREMITY VENOUS UPPER UNILAT LEFT   Final Result      IR-PICC LINE PLACEMENT W/ GUIDANCE > AGE 5   Final Result                  Ultrasound-guided PICC placement performed by qualified nursing staff as    above.          DX-CHEST-FOR LINE PLACEMENT Perform procedure in: Patient's Room   Final Result      1.  Peripherally inserted catheter has been placed and the tip projects appropriately over the superior vena cava.   2.  Stable enlargement of the cardiomediastinal  silhouette.   3.  Stable patchy bilateral interstitial opacities.      DX-KNEE 2- LEFT   Final Result      Status post removal of the tibial component of the left knee arthroplasty      MR-SHOULDER-W/O LEFT   Final Result      1.  Large joint effusion with a large amount of fluid seen extending into the subacromial/subdeltoid bursa with synovitis and small low signal foci within the bursa consistent with pockets of gas. These findings are very suspicious for septic arthritis    and septic bursitis.      2.  Complete full-thickness tear of the supraspinatus tendon with retraction to the bony glenoid.      3.  Full-thickness tear portion of the subscapularis tendon.      4.  Partial-thickness tear of the articular surface fibers of the infraspinatus tendon.      5.  Prominent muscle edema and atrophy involving the rotator cuff as well as all the muscles surrounding the shoulder which may represent myositis.      6.  Osteoarthritis of the glenohumeral joint.      7.  Diffuse tearing of the glenoid labrum.      8.  Subluxation of the long head of the biceps tendon medially from the bicipital groove with marked thinning of the intra-articular portion consistent with tendinosis.      9.  Extensive soft tissue edema to include fat and muscle involving all of the imaged structures around the shoulder likely representing cellulitis.      10.  Widening of the acromioclavicular joint with fragmentation of the distal clavicle consistent with old posttraumatic change versus surgical change.      [id number] VIEW   Final Result      Nonobstructive bowel gas pattern.      EC-ECHOCARDIOGRAM COMPLETE W/O CONT   Final Result      DX-CHEST-PORTABLE (1 VIEW)   Final Result      1.  Interstitial infiltrates versus edema.   2.  Cardiomegaly.      DX-KNEE 2- RIGHT   Final Result      1.  Negative for fracture or malalignment      2.  Right knee arthroplasty appears within normal limits      DX-KNEE 2- LEFT   Final Result      1.  New  lucency adjacent to the tibial component of arthroplasty suspicious for loosening or infection      2.  No other finding      US-EXTREMITY VENOUS UPPER UNILAT LEFT   Final Result      US-RENAL   Final Result      1.  Unremarkable kidneys.   2.  Limited evaluation of the bladder.      CT-ABDOMEN-PELVIS W/O   Final Result         Limited exam due to lack of IV contrast.         1. Mild diffuse wall thickening of the urinary bladder could relate to infection or inflammation. Correlate with UA. No hydronephrosis.   2. Nonspecific mildly prominent fluid-filled proximal small bowel in the left abdomen. This could be seen with enteritis, focal ileus or early obstruction.   3. Cirrhotic liver with portal hypertension and splenomegaly. Wall thickening of the ascending colon could relate to portal hypertension.   4. No ascites.      DX-SHOULDER 2+ LEFT   Final Result      1.  No evidence of acute fracture or dislocation.      2.  Old posttraumatic changes of the distal clavicle.      DX-CHEST-PORTABLE (1 VIEW)   Final Result      Cardiomegaly.            LABORATORY  Lab Results   Component Value Date    SODIUM 135 08/04/2022    POTASSIUM 4.1 08/04/2022    CHLORIDE 111 08/04/2022    CO2 18 (L) 08/04/2022    GLUCOSE 111 (H) 08/04/2022    BUN 16 08/04/2022    CREATININE 0.64 08/04/2022    CREATININE 0.9 03/12/2009        Lab Results   Component Value Date    WBC 4.2 (L) 08/04/2022    HEMOGLOBIN 7.3 (L) 08/04/2022    HEMATOCRIT 22.9 (L) 08/04/2022    PLATELETCT 145 (L) 08/04/2022        Total time of the discharge process exceeds 45 minutes.

## 2022-08-04 NOTE — DISCHARGE PLANNING
Case Management Discharge Planning    Admission Date: 7/10/2022  GMLOS: 7  ALOS: 25    6-Clicks ADL Score: 14  6-Clicks Mobility Score: 7  PT and/or OT Eval ordered: Yes  Post-acute Referrals Ordered: Yes  Post-acute Choice Obtained: Yes  Has referral(s) been sent to post-acute provider:  Yes      Anticipated Discharge Dispo: Discharge Disposition: D/T to Medicaid only Nursing home (64)    DME Needed: No    Action(s) Taken: [facility] has bed for patient. They are requesting updated PASRR/LOC and tomorrow's dose of dapto to go with patient.     LSW spoke to RN manager. Dapto can be ordered [id number] but [facility] has to compound it. LSW left a VM for Rory, in admissions at [facility] to inquire about this.     [facility] director to assist with updating LOC/PASRR.    MD has ordered covid test.    LSW spoke to RN. Patient needs gurney transport. LSW to request gurney transport for patient via [facility].     Escalations Completed: Long Length of Stay Committee     Medically Clear: Yes    Next Steps: f/u with [facility], arrange transport, and complete transfer packet    Barriers to Discharge: transport, LOC needed, dapto     Is the patient up for discharge tomorrow: No- discharge today    Addendum 1200: ABX changed to ceftriaxone. LSW spoke to Rory with [facility] who confirmed this is fine. Patient can still dc to them today. LSW requested transport for 1500.    Addendum 1220: LSW met with patient at bedside. LSW updated her on dc for 1500. Patient requests LSW call her daughter. LSW called Ava Laureano and spoke to her about dc plan. Ava consented to transfer. Ava reports she will be at the hospital in about 30 minutes. Transport confirmed for 1500 via [facility]. LSW to complete transfer packet once dc summary is placed.    Addendum 1343: Packet complete and left at nursing station. RN informed via voalte.

## 2022-08-04 NOTE — PROGRESS NOTES
"   Orthopaedic Progress Note    Interval changes:  Patient doing well  Staples out from left shoulder yesterday  Staples to be removed thurs from L knee  Hinge knee brace to remain unlocked   Prevena vac dressing in place to LUE without leak, LUE dressings CDI     ROS - Patient denies any new issues.  Pain well controlled.    /65   Pulse 74   Temp 37.5 °C (99.5 °F) (Temporal)   Resp 18   Ht 1.715 m (5' 7.5\")   Wt 95.1 kg (209 lb 10.5 oz)   SpO2 98%       Patient seen and examined  Sleeping  No acute distress  Breathing non labored  RRR  LUE dressings and staple removed and seristrips placed with benzoin, DNVI, moves all fingers, cap refill <2 sec.  LLE vac dressing CDI without leak, DNVI, moves all toes, cap refill <2 sec.     Recent Labs     08/01/22  0241   WBC 3.9*   RBC 2.46*   HEMOGLOBIN 7.0*   HEMATOCRIT 21.2*   MCV 86.2   MCH 28.5   MCHC 33.0*   RDW 47.3   PLATELETCT 133*   MPV 9.9       Active Hospital Problems    Diagnosis    • Hypertension [I10]      Priority: Medium   • Controlled type 2 diabetes mellitus with hyperglycemia, without long-term current use of insulin (HCC) [E11.65]      Priority: Medium   • Cirrhosis (HCC) [K74.60]      Priority: Low   • Protein-calorie malnutrition, severe (HCC) [E43]    • Vitamin D deficiency [E55.9]    • Erosive osteoarthritis of right hand [M15.4]    • Hepatitis C infection [B19.20]    • Coagulopathy (HCC) [D68.9]    • Drug-induced pancytopenia (HCC) [D61.811]    • Septic arthritis of knee, left (HCC) [M00.9]    • Septic arthritis of shoulder, left (HCC) [M00.9]    • Iron deficiency anemia [D50.9]    • Acute cystitis without hematuria [N30.00]    • Bacteremia [R78.81]    • Hyponatremia [E87.1]    • ELIZABETH (acute kidney injury) (HCC) [N17.9]    • Hyperlipidemia [E78.5]    • Metabolic encephalopathy [G93.41]    • Atrial fibrillation (HCC) [I48.91]        Assessment/Plan:  Patient doing well  POD#14 S/P  1. left total knee arthroplasty explantation  2. left knee " insertion of articulating spacer   3. left knee application of incisional wound vacuum   POD#16 S/P   1.  Left shoulder arthrotomy and lavage for infection with partial synovectomy.  2.  Left shoulder incision and drainage of the subdeltoid abscess.  Wt bearing status - WBAT  Wound care/Drains - Dressings to be left in place  Future Procedures - none planned   Sutures/Staples out- L knee in 2 days  PT/OT-initiated  Antibiotics: dapto 570mg IV qd  DVT Prophylaxis- TEDS/SCDs/Foot pumps  Amaro-none  Case Coordination for Discharge Planning - Disposition pending abx needs

## 2022-08-04 NOTE — DISCHARGE PLANNING
DC Transport Scheduled    Received request at: 1203    Transport Company Scheduled:  [employer]  Spoke with Beny at [facility] to schedule transport.  [facility] Trip #:  [id number]     Scheduled Date: 08/04/2022  Scheduled Time: 1500    Destination: [facility]     Notified care team of scheduled transport via [employer].     If there are any changes needed to the DC transportation scheduled, please contact [facility] at ext. [phone] between the hours of 3615-5170 Mon-Fri. If outside those hours, contact the ED Case Manager at ext. [phone].

## 2022-08-04 NOTE — PROGRESS NOTES
Attempted to call report on patient transferring to [facility]. Per , unable to page RN to receive report/RN unavailable.

## 2022-08-04 NOTE — PROGRESS NOTES
"CNA notified this RN that while patient was getting dressed, she raised bilateral arms above head and heard \"something pop\".     On assessment patient is crying, stating \"thank you so much for helping me. I want to get well and leave but I feel like I cant do anything\".     Notified Dr. Fry, per provider, update Grant Chiang PA-C.     Called patients daughter, leela to update.  "

## 2022-08-04 NOTE — PROGRESS NOTES
Infectious Disease Progress Note    Author: Aurea Ramos M.D. Date & Time of service: 2022  10:08 AM    Chief Complaint:  Group B streptococcus bacteremia, PJI knee       Interval History:    AF, O2 RA, complaining of pain in left knee.     AF, O2 RA, complaining of some pain in left knee but overall unchanged.     AF, O2 RA, OR yesterday for left shoulder I&D.  Discussed below.      Review of Systems:  Review of Systems   Respiratory: Negative for cough, sputum production and shortness of breath.    Gastrointestinal: Negative for abdominal pain, constipation, diarrhea, nausea and vomiting.   Musculoskeletal: Negative for joint pain and myalgias.   Psychiatric/Behavioral: The patient is not nervous/anxious.        Hemodynamics:  Temp (24hrs), Av.9 °C (98.5 °F), Min:36.4 °C (97.5 °F), Max:37.5 °C (99.5 °F)  Temperature: 36.7 °C (98 °F)  Pulse  Av.8  Min: 44  Max: 126   Blood Pressure: 122/70       Physical Exam:  Physical Exam  Cardiovascular:      Rate and Rhythm: Normal rate and regular rhythm.      Heart sounds: Normal heart sounds.   Pulmonary:      Effort: Pulmonary effort is normal.      Breath sounds: Normal breath sounds.   Abdominal:      General: Abdomen is flat. Bowel sounds are normal.      Palpations: Abdomen is soft.   Musculoskeletal:         General: Tenderness and signs of injury present.      Left lower leg: Edema present.   Skin:     General: Skin is warm and dry.   Neurological:      General: No focal deficit present.      Mental Status: She is oriented to person, place, and time.   Psychiatric:         Mood and Affect: Mood normal.         Behavior: Behavior normal.         Meds:    Current Facility-Administered Medications:   •  ceFAZolin  •  Notify MD and PharmD if FSBG level is less than or equal to 70 mg/dL or patient is showing signs/symptoms of hypoglycemia (tachycardia, palpitations, diaphoresis, clammy, tremulousness, nausea, confused) **AND** Administer  20 grams of glucose (approximately 8 ounces of fruit juice) every 15 minutes PRN FSBS less than 70 mg/dL **AND** dextrose bolus  •  vitamin D2 (Ergocalciferol)  •  ascorbic acid  •  omeprazole  •  ferrous sulfate  •  oxyCODONE immediate-release  •  calcium carbonate  •  loperamide  •  lidocaine  •  POC Blood Glucose **AND** insulin regular  •  lactulose  •  dronedarone  •  albuterol  •  atorvastatin  •  acetaminophen    Labs:  Recent Labs     08/04/22 0317   WBC 4.2*   RBC 2.64*   HEMOGLOBIN 7.3*   HEMATOCRIT 22.9*   MCV 86.7   MCH 27.7   RDW 48.3   PLATELETCT 145*   MPV 9.7   NEUTSPOLYS 59.90   LYMPHOCYTES 25.50   MONOCYTES 9.50   EOSINOPHILS 3.30   BASOPHILS 0.90     Recent Labs     08/04/22 0317   SODIUM 135   POTASSIUM 4.1   CHLORIDE 111   CO2 18*   GLUCOSE 111*   BUN 16     Recent Labs     08/04/22 0317   ALBUMIN 1.6*   TBILIRUBIN 0.4   ALKPHOSPHAT 192*   TOTPROTEIN 5.3*   ALTSGPT 30   ASTSGOT 30   CREATININE 0.64       Imaging:  CT-ABDOMEN-PELVIS W/O    Result Date: 7/10/2022  7/10/2022 4:46 PM HISTORY/REASON FOR EXAM:  Abdominal pain, fever. TECHNIQUE/EXAM DESCRIPTION: CT scan of the abdomen and pelvis without contrast. Noncontrast helical scanning was obtained from the diaphragmatic domes through the pubic symphysis. Low dose optimization technique was utilized for this CT exam including automated exposure control and adjustment of the mA and/or kV according to patient size. COMPARISON: None. FINDINGS: Lower Chest: Minimal bibasilar atelectasis. Liver: Cirrhotic appearing liver. Spleen: Enlarged. Pancreas: Unremarkable. Gallbladder: Distended gallbladder. Biliary: No biliary dilatation.. Adrenal glands: Nonenlarged. Kidneys: No renal calculus. No hydronephrosis.. Bowel: Wall thickening of the descending colon. Nonspecific mildly prominent fluid-filled small bowel in the left abdomen. Moderate amount of stool throughout the colon. Lymph nodes: No adenopathy. Vasculature: The abdominal aorta is normal in  caliber Peritoneum: Unremarkable without ascites. Musculoskeletal: No aggressive osseous lesion. Moderate degenerative change of the lumbar spine. Pelvis: Mild diffuse wall thickening of the urinary bladder.. No pelvic free fluid.     Limited exam due to lack of IV contrast. 1. Mild diffuse wall thickening of the urinary bladder could relate to infection or inflammation. Correlate with UA. No hydronephrosis. 2. Nonspecific mildly prominent fluid-filled proximal small bowel in the left abdomen. This could be seen with enteritis, focal ileus or early obstruction. 3. Cirrhotic liver with portal hypertension and splenomegaly. Wall thickening of the ascending colon could relate to portal hypertension. 4. No ascites.    [id number] VIEW    Result Date: 7/16/2022 7/16/2022 2:08 PM HISTORY/REASON FOR EXAM:  Abdominal Pain. TECHNIQUE/EXAM DESCRIPTION AND NUMBER OF VIEWS:  1 view(s) of the abdomen. COMPARISON: CT 7/10/2022 FINDINGS: No free air under the diaphragm.  No significant dilated small bowel loops or air-fluid levels. There is air seen throughout the colon. Amaro catheter in place. No suspicious calcifications. No acute osseous abnormality. Spondylosis.     Nonobstructive bowel gas pattern.    DX-CHEST-FOR LINE PLACEMENT Perform procedure in: Patient's Room    Result Date: 7/20/2022 7/20/2022 11:10 AM HISTORY/REASON FOR EXAM:  confirmation of PICC line. TECHNIQUE/EXAM DESCRIPTION AND NUMBER OF VIEWS: Single view of the chest. COMPARISON: None FINDINGS: A peripherally inserted catheter has been placed.  The tip projects appropriately over the superior vena cava. Heart size is enlarged. Stable patchy bilateral interstitial opacities. No pleural abnormalities are noted.     1.  Peripherally inserted catheter has been placed and the tip projects appropriately over the superior vena cava. 2.  Stable enlargement of the cardiomediastinal silhouette. 3.  Stable patchy bilateral interstitial opacities.    DX-CHEST-PORTABLE  (1 VIEW)    Result Date: 7/14/2022 7/14/2022 1:12 AM HISTORY/REASON FOR EXAM:  Shortness of Breath TECHNIQUE/EXAM DESCRIPTION AND NUMBER OF VIEWS: Single portable view of the chest. COMPARISON: 7/10/2022 FINDINGS: HEART: Enlarged. There is atherosclerotic calcification in the aortic arch. LUNGS: Low lung volumes. Increased interstitial opacities.. Calcified mediastinal nodes. PLEURA: No effusion or pneumothorax.     1.  Interstitial infiltrates versus edema. 2.  Cardiomegaly.    DX-CHEST-PORTABLE (1 VIEW)    Result Date: 7/10/2022  7/10/2022 4:02 PM HISTORY/REASON FOR EXAM: Hypotension. Sepsis. TECHNIQUE/EXAM DESCRIPTION AND NUMBER OF VIEWS: Single portable view of the chest. COMPARISON: 9/9/2020 FINDINGS: There is no evidence of focal consolidation or evidence of pulmonary edema. There is no pleural effusion. The heart is enlarged. There are calcified mediastinal lymph nodes.     Cardiomegaly.    DX-KNEE 2- LEFT    Result Date: 7/19/2022 7/19/2022 8:13 PM HISTORY/REASON FOR EXAM:  Pain/Deformity Following Trauma Hardware removal TECHNIQUE/EXAM DESCRIPTION AND NUMBER OF VIEWS:  2 views of the LEFT knee. COMPARISON: 7/13/2022 FINDINGS: Tibial component of the left knee arthroplasty has been removed. There are postoperative changes in the soft tissues with staples in the overlying skin. A drain projects over the soft tissues lateral to the femoral condyle     Status post removal of the tibial component of the left knee arthroplasty    DX-KNEE 2- RIGHT    Result Date: 7/13/2022 7/13/2022 1:17 PM HISTORY/REASON FOR EXAM:  Atraumatic Pain/Swelling/Deformity. Unable to bear weight on right leg TECHNIQUE/EXAM DESCRIPTION AND NUMBER OF VIEWS:  2 views of the RIGHT knee. COMPARISON: None FINDINGS: There is a right knee arthroplasty present. The arthroplasty appears within normal limits. There are no fracture.     1.  Negative for fracture or malalignment 2.  Right knee arthroplasty appears within normal limits    DX-KNEE  2- LEFT    Result Date: 7/13/2022 7/13/2022 12:55 PM HISTORY/REASON FOR EXAM:  Atraumatic Pain/Swelling/Deformity. Left leg pain TECHNIQUE/EXAM DESCRIPTION AND NUMBER OF VIEWS:  2 views of the LEFT knee. COMPARISON: Left knee x-ray 9/13/2020 FINDINGS: There is no fracture. Alignment is normal. There is a left knee arthroplasty. There are new areas of lucency adjacent to the tibial component both medially and laterally. This is suspicious for prosthetic loosening or infection.     1.  New lucency adjacent to the tibial component of arthroplasty suspicious for loosening or infection 2.  No other finding    DX-SHOULDER 2+ LEFT    Result Date: 7/10/2022  7/10/2022 4:38 PM HISTORY/REASON FOR EXAM: Left-sided shoulder pain. TECHNIQUE/EXAM DESCRIPTION AND NUMBER OF VIEWS:  3 views of the LEFT shoulder. COMPARISON: None FINDINGS: Bone mineralization is normal. No evidence of acute fracture. There is old posttraumatic change of the distal clavicle with a nonunified fracture fragment. Soft tissues are normal.     1.  No evidence of acute fracture or dislocation. 2.  Old posttraumatic changes of the distal clavicle.    MR-SHOULDER-W/O LEFT    Result Date: 7/17/2022 7/16/2022 6:29 PM HISTORY/REASON FOR EXAM: Left shoulder infection. TECHNIQUE/EXAM DESCRIPTION: MRI of the LEFT shoulder without contrast. Using a [employer] Signa 1.5 Suzanne MRI scanner, T1 sagittal, fast spin-echo T2 fat-suppressed oblique coronal, sagittal, and axial and intermediate fast spin-echo oblique coronal images were obtained. COMPARISON: None. FINDINGS: The study is limited by patient motion artifact. Osseous acromial outlet: The acromion demonstrates a curved undersurface. There is no lateral downsloping. There is no evidence of an inferiorly directed subacromial enthesophyte. The acromioclavicular joint is widened with fragmentation of the distal clavicle possibly related to prior surgery or trauma. There is a large amount of fluid seen within the  subacromial/subdeltoid bursa. Large joint effusion extends into the subacromial/subdeltoid bursa. There are pockets of low signal gas and the subdeltoid bursa. There is synovitis within the bursa. Rotator cuff: There is complete full-thickness tear of the supraspinatus tendon with retraction to the bony glenoid. There is prominent muscle atrophy and edema. There is partial thickness tear of the articular surface fibers of the infraspinatus with prominent muscle atrophy and edema. There is full-thickness tear of some of the fibers of the subscapularis tendon. There is prominent muscle edema and atrophy. Glenoid labrum: There is diffuse tearing of the glenoid labrum include the biceps anchor. Osseous structures/Cartilaginous surfaces: There is cartilage loss involving the glenohumeral joint with osteophytic spurring. There is mild marrow edema involving the humeral head and edema involving the bony glenoid posteriorly. There is some posterior  subluxation of the humeral head with respect to the bony glenoid. Miscellaneous: There is a large joint effusion with synovitis. Fluid is again noted to extend into the subacromial/subdeltoid bursa where there is gas within the bursa. The long head of the biceps tendon is dislocated medially from the bicipital groove and is diffusely thickened. There is extensive edema/induration of all of the soft tissues surrounding the shoulder. This extends into the axilla and proximal humerus.     1.  Large joint effusion with a large amount of fluid seen extending into the subacromial/subdeltoid bursa with synovitis and small low signal foci within the bursa consistent with pockets of gas. These findings are very suspicious for septic arthritis and septic bursitis. 2.  Complete full-thickness tear of the supraspinatus tendon with retraction to the bony glenoid. 3.  Full-thickness tear portion of the subscapularis tendon. 4.  Partial-thickness tear of the articular surface fibers of the  infraspinatus tendon. 5.  Prominent muscle edema and atrophy involving the rotator cuff as well as all the muscles surrounding the shoulder which may represent myositis. 6.  Osteoarthritis of the glenohumeral joint. 7.  Diffuse tearing of the glenoid labrum. 8.  Subluxation of the long head of the biceps tendon medially from the bicipital groove with marked thinning of the intra-articular portion consistent with tendinosis. 9.  Extensive soft tissue edema to include fat and muscle involving all of the imaged structures around the shoulder likely representing cellulitis. 10.  Widening of the acromioclavicular joint with fragmentation of the distal clavicle consistent with old posttraumatic change versus surgical change.    US-RENAL    Result Date: 7/10/2022  7/10/2022 7:06 PM HISTORY/REASON FOR EXAM:  Abnormal Labs TECHNIQUE/EXAM DESCRIPTION: Renal ultrasound. COMPARISON:  CT abdomen and pelvis 7/10/2022 FINDINGS: The right kidney measures 12.4 cm.  The right kidney appears normal in contour and parenchymal echotexture. The corticomedullary differentiation is preserved. The right renal collecting system is not dilated. No hydronephrosis. There are no renal calculi. The left kidney measures 10.8 cm. The left kidney appears normal in contour and parenchymal echotexture. The corticomedullary differentiation is preserved. The left renal collecting system is not dilated. No hydronephrosis. There are no renal calculi. The bladder is decompressed around a Amaro catheter.     1.  Unremarkable kidneys. 2.  Limited evaluation of the bladder.    DX-HAND 2- RIGHT    Result Date: 7/28/2022 7/28/2022 4:05 PM HISTORY/REASON FOR EXAM:  Atraumatic Pain/Swelling/Deformity. TECHNIQUE/EXAM DESCRIPTION AND NUMBER OF VIEWS:  2 views of the RIGHT hand. COMPARISON: Right hand radiography, 12/4/2010 FINDINGS: Bones: Decreased bone mineralization. No acute fracture. No focal bone lesion. Joints: Marked degenerative changes involving the  radiocarpal, intercarpal and carpometacarpal joints, as well as marginal erosions involving the radial aspects of the second, third and fifth distal interphalangeal joints. Erosive changes are greatest involving the right first carpometacarpal joint. Soft tissues: Arteriosclerosis.     1. Interval development of osteopenia. 2. Marked erosive changes involving the right wrist and right fingers, most compatible with erosive osteoarthritis. Involvement is greatest involving the right first carpometacarpal joint.    US-EXTREMITY VENOUS UPPER UNILAT LEFT    Result Date: 2022   Upper Extremity  Venous Duplex Report  Vascular Laboratory  CONCLUSIONS  No obvious evidence of deep venous thrombosis in left upper extremity.  FRANCISCA TURNER  Exam Date:     2022 10:56  Room #:     Inpatient  Priority:     Routine  Ht (in):             Wt (lb):  Ordering Physician:        GILLIAN ANTUNEZ  Referring Physician:       [id number] Carlisle  Sonographer:               David Estes RVT                             JERICA  Study Type:                Complete Unilateral  Technical Quality:         Adequate  Age:    62    Gender:     F  MRN:    [id number]  :    1960      BSA:  Indications:     Swelling of Limb  CPT Codes:       81802  ICD Codes:       729.81  History:         Swelling of left upper extremity / drain in place within                   medial bicep.  Limitations:     Bandaging in place at the mid lateral arm - unable to                   visualized proximal cephalic vein.  PROCEDURES:  Left upper extremity venous duplex imaging.  The following venous structures were evaluated: internal jugular,  subclavian, axillary, brachial, cephalic, and basilic veins.  Serial compression, color, and spectral Doppler flow evaluations were  performed.  FINDINGS:  Left upper extremity -  No obvious evidence of deep venous thrombosis.  All veins, where visualized, demonstrate complete color filling and  compressibility with  normal venous flow dynamics including spontaneous flow  and respiratory phasicity.  Flow was evaluated in the contralateral subclavian vein and normal venous  flow dynamics including spontaneous flow and respiratory phasic variation  were demonstrated.  Donn Mullen MD  (Electronically Signed)  Final Date:      2022                   11:38    US-EXTREMITY VENOUS UPPER UNILAT LEFT    Result Date: 2022   Upper Extremity  Venous Duplex Report  Vascular Laboratory  CONCLUSIONS  1. No evidence of deep venous thrombosis.  2. Trace amount of fluid along the distal biceps, nonspecific, possibly  posttraumatic. No drainable fluid collections.  FRANCISCA TURNER  Exam Date:     2022 01:05  Room #:     Inpatient  Priority:     Stat  Ht (in):             Wt (lb):  Ordering Physician:        GHADA RAMOS  Referring Physician:       [id number] [place]  Sonographer:               Yana Haney [id number]  Study Type:                Complete Unilateral  Technical Quality:         Adequate  Age:    62    Gender:     F  MRN:    [id number]  :    1960      BSA:  Indications:     Localized swelling, mass and lump, unspecified upper limb,                   Edema, unspecified  CPT Codes:       88554  ICD Codes:       R22.30  R60.9  History:         Left upper extremity swelling/edema. No priror exams.  Limitations:  PROCEDURES:  Left upper extremity venous duplex imaging.  The following venous structures were evaluated: internal jugular,  subclavian, axillary, brachial, cephalic, and basilic veins.  Serial compression, color, and spectral Doppler flow evaluations were  performed.  FINDINGS:  Left upper extremity-  No evidence of deep venous thrombosis.  Evidence of subacute to chronic superficial venous thrombosis at the distal  cephalic vein.  All other veins demonstrate complete color filling and compressibility with  normal venous flow dynamics including spontaneous flow and respiratory  phasicity.  Flow was  evaluated in the contralateral subclavian vein and normal venous  flow dynamics including spontaneous flow and respiratory phasic variation  were demonstrated.  Incidental Finding:  Non-vascularized anechoic structure seen at the left mid bicep.  Santosh Stephenson MD  (Electronically Signed)  Final Date:      2022                   03:24    EC-ECHOCARDIOGRAM COMPLETE W/O CONT    Result Date: 2022  Transthoracic Echo Report Echocardiography Laboratory CONCLUSIONS Normal left ventricular systolic function. The left ventricular ejection fraction is visually estimated to be 60%. Moderate concentric left ventricular hypertrophy. Mild mitral regurgitation. Normal right ventricular size and systolic function. FRANCISCA TURNER Exam Date:         2022                    15:30 Exam Location:     Inpatient Priority:          Routine Ordering Physician:        GHADA RAMOS Referring Physician:       [id number] Main Sonographer:               Marybeth DAS Age:    62     Gender:    F MRN:    [id number] :    1960 BSA:    1.99   Ht (in):    67     Wt (lb):    192 Exam Type:     Complete Indications:     Shortness of breath ICD Codes:       R06.02 CPT Codes:       00055 BP:   73     /   45     HR:   96 Technical Quality:       Fair MEASUREMENTS  (Male / Female) Normal Values 2D ECHO LV Diastolic Diameter PLAX        5.2 cm                4.2 - 5.9 / 3.9 - 5.3 cm LV Systolic Diameter PLAX         3.7 cm                2.1 - 4.0 cm IVS Diastolic Thickness           1.6 cm                LVPW Diastolic Thickness          1.2 cm                LVOT Diameter                     2.2 cm                Estimated LV Ejection Fraction    60 %                  LV Ejection Fraction MOD BP       68.2 %                >= 55  % LV Ejection Fraction MOD 4C       65.7 %                LV Ejection Fraction MOD 2C       72.4 %                IVC Diameter                      1 cm                  DOPPLER AV  Peak Velocity                  1.6 m/s               AV Peak Gradient                  10.2 mmHg             AV Mean Gradient                  6 mmHg                LVOT Peak Velocity                0.82 m/s              AV Area Cont Eq vti               1.5 cm2               Mitral E Point Velocity           1.4 m/s               TR Peak Velocity                  275 cm/s              * Indicates values subject to auto-interpretation LV EF:  60    % FINDINGS Left Ventricle Normal left ventricular chamber size. Moderate concentric left ventricular hypertrophy. Normal left ventricular systolic function. The left ventricular ejection fraction is visually estimated to be 60%. Normal diastolic function. Right Ventricle Normal right ventricular size and systolic function. Right Atrium Normal right atrial size. Normal inferior vena cava size and inspiratory collapse. Left Atrium Mildly dilated left atrium. Mitral Valve Structurally normal mitral valve. No mitral stenosis. Mild mitral regurgitation. Aortic Valve Structurally normal aortic valve without stenosis or regurgitation. The aortic valve appears trileaflet. Tricuspid Valve Structurally normal tricuspid valve. No tricuspid stenosis. Mild tricuspid regurgitation. Estimated right ventricular systolic pressure is 34  mmHg. Pulmonic Valve Structurally normal pulmonic valve without stenosis or regurgitation. Pericardium No pericardial effusion. Aorta Normal aortic root for body surface area. The ascending aorta diameter is 3.1 cm. Grant Jimenez M.D. (Electronically Signed) Final Date:     14 July 2022                 16:47    IR-PICC LINE PLACEMENT W/ GUIDANCE > AGE 5    Result Date: 7/20/2022  HISTORY/REASON FOR EXAM:   PICC placement. TECHNIQUE/EXAM DESCRIPTION AND NUMBER OF VIEWS:   PICC line insertion with ultrasound guidance.  The procedure was performed using maximal sterile barrier technique including sterile gown, mask, cap, and donning of sterile gloves  following appropriate hand hygiene and/or sterile scrub. Patient skin site was prepped with 2% Chlorhexidine solution. FINDINGS:  PICC line insertion with Ultrasound Guidance was performed by qualified nursing staff without the assistance of a Radiologist. PICC positioning appropriateness confirmed by 3CG technology; chest xray only needed in the instance 3CG unable to confirm placement.              Ultrasound-guided PICC placement performed by qualified nursing staff as above.     X-ray Shoulder 2+ View (Complete) Left    Result Date: 7/6/2022  CLINICAL DATA: Shoulder Pain, TECHNICAL: 3 views. COMPARISON: April 18, 2021 FINDINGS: The left humeral head is subluxed inferior relative to the typical anatomic relationship with the glenoid fossa.  There is increased tissue or fluid density in the subacromial space. Left glenohumeral joint space remains visible on frontal view. Old fracture deformity of the distal left clavicle.    INFERIOR SUBLUXATION OF THE LEFT HUMERAL HEAD RELATIVE TO THE GLENOID WITH INCREASED SUBACROMIAL DENSITY WHICH COULD BE RELATED TO SOFT TISSUE SWELLING OR EFFUSION. NO ACUTE FRACTURE. OLD FRACTURE DEFORMITY OF THE DISTAL LEFT CLAVICLE.      Micro:  Results     ** No results found for the last 168 hours. **          Assessment:  Active Hospital Problems    Diagnosis    • *Bacteremia [R78.81]    • Protein-calorie malnutrition, severe ([facility]) [E43]    • Vitamin D deficiency [E55.9]    • Erosive osteoarthritis of right hand [M15.4]    • Hepatitis C infection [B19.20]    • Coagulopathy ([facility]) [D68.9]    • Drug-induced pancytopenia ([facility]) [D61.811]    • Septic arthritis of knee, left ([facility]) [M00.9]    • Septic arthritis of shoulder, left ([facility]) [M00.9]    • Iron deficiency anemia [D50.9]    • Acute cystitis without hematuria [N30.00]    • Hyponatremia [E87.1]    • ELIZABETH (acute kidney injury) ([facility]) [N17.9]    • Hyperlipidemia [E78.5]    • Metabolic encephalopathy [G93.41]    • Atrial fibrillation ([facility])  [I48.91]    • Cirrhosis ([facility]) [K74.60]    • Hypertension [I10]    • Controlled type 2 diabetes mellitus with hyperglycemia, without long-term current use of insulin ([facility]) [E11.65]      Interval 24 hours:      AF, O2 RA  Labs reviewed  Micro reviewed    Pt doing well overall, no significant pain in knee. Plan is for stabel removal at 1:00 pm today.  Abx as below.     Assessment:  Patient is a 62-year-old woman with a history of cocaine and alcohol abuse, cirrhosis, type 2 diabetes mellitus, history of left knee arthroplasty secondary to infection with prior skin grafts, and atrial fibrillation on apixaban admitted on 7/10/2022 secondary to altered mentation and hypotension.  She was found to be in acute kidney injury with a creatinine of 5.35.  Urine drug screen was positive for opiates and oxycodone.  Urinalysis had significant pyuria.  CT scan of the abdomen revealed mild diffuse wall thickening of the urinary bladder related to infection versus inflammation.  Blood cultures are now growing group B streptococcus.          Pertinent diagnoses:  Pancytopenia, ongoing with no significant changes since stopping ceftriaxone and transitioning to daptomycin.   Bacteremia   -Blood cultures on 7/10 positive GBS, penicillin sensitive  -Blood cultures on 7/13 are no growth to date  -TTE on 7/14 with no vegetations, trileaflet aortic valve, no significant valvular dysfunction, EF 60%  Urinary tract infection suspected due to pyuria, urine culture negative  Acute kidney injury, resolved   Prosthetic joint infection, left knee septic arthritis   -Evaluated by orthopedics with synovial fluid aspirated on 7/16, ,310 and 72% polys consistent with infection  -OR on 7/19 for left total knee arthroplasty explantation and insertion of an articulating spacer, cultures obtained (stage I of planned 2-stage procedure)  Right shoulder inflammation  -Synovial fluid aspirated on 7/16, Bloody tap, ,000, WBC 29,802, 90%  polys   Left shoulder septic arthritis, subdeltoid abscess  - MRI left shoulder with large joint effusion, synovitis and pockets of gas suspicious for septic arthritis and septic bursitis.  Also with full-thickness tear of the supraspinatus tendon and portion of subscapularis tendon.  Partial tear of infraspinatus tendon.  -OR on 7/17 left shoulder arthrotomy and lavage with partial synovectomy, left shoulder incision and drainage of subdeltoid abscess, per op note: copious amounts of cloudy fluid expressed. Cx sent.   Chronic hyponatremia   Alcoholic cirrhosis   Chronic thrombocytopenia  Remote history of cocaine and alcohol abuse   Diarrhea  -C. difficile negative on 7/14      Plan:   -Stop daptomycin as cell counts have not recovered since transition from ceftriaxone and her pancytopenia is more likely related to her cirrhosis rather than antibiotic - restart Ceftriaxone and will follow labs (if new significant decline can use Levofloxacin 750 mg daily to complete course)  --Repeat CBC on 8/8 & 8/11 and then at least weekly - will request MA call  ([facility]) for lab results -  to confirm agreement with labs with SNF  --Weekly CMP   -- She will need a 6-week IV antibiotic course from date of knee surgery,  and then stop antibiotics with potential aspiration of the knee to ensure no infection prior to replacing the hardware.  Continue ceftriaxone 2 grams daily at SNF end 8//30/22   -Follow-up in ID clinic at the end of antibiotic course     Dispo: Discharge to skilled facility   PICC: Placed        Discussed with ID pharmacy. ID will sign off.

## 2022-08-04 NOTE — PROGRESS NOTES
"Pt disgruntled at time of BG assessment stating acuchecks unnecessary as she has not required insulin coverage \"in awhile\"... per MAR, pt given 6 units at 1700 yesterday for . Education provided.     BG 96mg/dL at this time. Patient c/o breakfast being incorrect. Message sent to nutrition services, Suyapa Wyatt for request for sausage.     Patient repositioned for comfort at this time. Safety precautions maintained. Call light and personal items within reach.   "

## 2022-08-04 NOTE — PROGRESS NOTES
"   Orthopaedic Progress Note    Interval changes:  Patient doing well  Staples out from left shoulder Tuesday  I will remove staples from L knee later today  Hinge knee brace to remain unlocked   Prevena vac dressing in place to LUE without leak,will change to DSD; LUE dressings CDI     ROS - Patient denies any new issues.  Pain well controlled.    /70   Pulse 62   Temp 36.7 °C (98 °F) (Temporal)   Resp 18   Ht 1.715 m (5' 7.5\")   Wt 95.1 kg (209 lb 10.5 oz)   SpO2 97%     Patient seen and examined  [place]  No acute distress  Breathing non labored  RRR  LUE dressings and staple removed and seristrips placed with benzoin, DNVI, moves all fingers, cap refill <2 sec.  LLE vac dressing CDI without leak, DNVI, moves all toes, cap refill <2 sec.     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 left total knee arthroplasty explantation  2. " left knee insertion of articulating spacer   3. left knee application of incisional wound vacuum   POD#17 S/P   1.  Left shoulder arthrotomy and lavage for infection with partial synovectomy.  2.  Left shoulder incision and drainage of the subdeltoid abscess.  Wt bearing status - WBAT  Wound care/Drains - Dressings to be left in place  Future Procedures - none planned   Sutures/Staples out- L knee in 2 days  PT/OT-initiated  Antibiotics: dapto 570mg IV qd  DVT Prophylaxis- TEDS/SCDs/Foot pumps  Amaro-none  Case Coordination for Discharge Planning - Disposition pending abx needs

## 2022-08-04 NOTE — PROGRESS NOTES
Assumed care of patient who is observed to be sleeping at this time, RR WDL on RA, no acute distress. Safety precautions maintained with bed in lowest position, wheels locked, call light and personal items within reach.

## 2022-08-04 NOTE — DISCHARGE PLANNING
Agency/Facility Name: [facility]  Spoke To: Rory   Outcome: Just needs updated PASSR and LOC, can take her today once these are completed. Rory also stated that they will need today and tomorrows dose of daptomycin 700 mg. After this they can take it from here.      @1205  Agency/Facility Name: [facility]  Spoke To: Rory   Outcome: Requested pt get swabbed for Covid before discharging from hospital.     @1400  Agency/Facility Name: [facility]  Spoke To: Rory   Outcome: Called to inform DPA that PASSR and LOC still not running, stated that it is inmanual. Escalated to leadership.

## 2022-08-04 NOTE — DOCUMENTATION QUERY
[facility]                                                                       Query Response Note      PATIENT:               FRANCISCA TURNER  ACCT #:                  [id number]  MRN:                     [id number]  :                      1960  ADMIT DATE:       7/10/2022 3:35 PM  DISCH DATE:          RESPONDING  PROVIDER #:        [id number]           QUERY TEXT:    Please provide additional clinical indicators supportive of your documented diagnosis of Severe Protein calorie malnutrition.            The patient's Clinical Indicators include:  BMI: 32.35  ROS: Negative for weight loss   Dietary/nutrition not assessed   Protein supplements  Diabetic diet  Chronic illness  ETOH abuse     Thank you,  Felicitas Edwards RN, CCS  Clinical Documentation Integrity  Options provided:   -- Severe protein calorie malnutrition is valid, please document additional clinical indicators   -- Severe protein calorie malnutrition does not exist and amended documentation provided in the medical record   -- Other explanation, (Pls specify)   -- Unable to determine      Query created by: Felicitas Edwards on 8/3/2022 3:29 PM    RESPONSE TEXT:    Severe protein calorie malnutrition does not exist and amended documentation provided in the medical record          Electronically signed by:  OLVIN FLAHERTY MD 2022 7:08 AM

## 2022-08-10 ENCOUNTER — TELEPHONE (OUTPATIENT)
Dept: INFECTIOUS DISEASES | Facility: MEDICAL CENTER | Age: 62
End: 2022-08-10
Payer: MEDICAID

## 2022-08-10 NOTE — TELEPHONE ENCOUNTER
Pt in [facility] skilled nursing, message left for MANDY Dale to get a repeat CBC and also to schedule HFV.

## 2022-08-12 NOTE — TELEPHONE ENCOUNTER
Spoke with MANDY today, states pt refused blood draw earlier in the week and they did not have an RN on site to draw from her line.  She also states they have been having issues with their lab showing up on time so things have been delayed.    Advised they will try to draw today and if they need to draw from her line she will go do it herself.    Will send labs for review when received.

## 2022-08-16 LAB
FUNGUS SPEC CULT: NORMAL
FUNGUS SPEC FUNGUS STN: NORMAL
SIGNIFICANT IND 70042: NORMAL
SITE SITE: NORMAL
SOURCE SOURCE: NORMAL

## 2022-08-31 LAB
MYCOBACTERIUM SPEC CULT: NORMAL
RHODAMINE-AURAMINE STN SPEC: NORMAL
SIGNIFICANT IND 70042: NORMAL
SITE SITE: NORMAL
SOURCE SOURCE: NORMAL

## 2022-10-17 ENCOUNTER — APPOINTMENT (OUTPATIENT)
Dept: PHYSICAL THERAPY | Facility: REHABILITATION | Age: 62
End: 2022-10-17
Attending: NURSE PRACTITIONER
Payer: MEDICAID

## 2022-10-20 ENCOUNTER — PHYSICAL THERAPY (OUTPATIENT)
Dept: PHYSICAL THERAPY | Facility: REHABILITATION | Age: 62
End: 2022-10-20
Attending: NURSE PRACTITIONER
Payer: MEDICAID

## 2022-10-20 DIAGNOSIS — G89.29 CHRONIC PAIN OF LEFT KNEE: ICD-10-CM

## 2022-10-20 DIAGNOSIS — M25.562 CHRONIC PAIN OF LEFT KNEE: ICD-10-CM

## 2022-10-20 PROCEDURE — 97162 PT EVAL MOD COMPLEX 30 MIN: CPT

## 2022-10-20 ASSESSMENT — ENCOUNTER SYMPTOMS
QUALITY: STABBING
PAIN SCALE: 10
PAIN SCALE AT HIGHEST: 10
QUALITY: THROBBING
PAIN SCALE AT LOWEST: 4

## 2022-10-20 ASSESSMENT — ACTIVITIES OF DAILY LIVING (ADL): POOR_BALANCE: 1

## 2022-10-20 NOTE — OP THERAPY EVALUATION
Outpatient Physical Therapy  INITIAL EVALUATION    [facility] [address]  [address]  [facility] [address]  Phone:  [phone]  Fax:  [phone]    Date of Evaluation: 10/20/2022    Patient: April Alicia  YOB: 1960  MRN: [id number]     Referring Provider: Karen Mcclure P.A.-C.  [address] [place] [address]   Referring Diagnosis Pain in left knee [M25.562]     Time Calculation  Start time: 0900  Stop time: 0940 Time Calculation (min): 40 minutes         Chief Complaint: Knee Problem    Visit Diagnoses     ICD-10-CM   1. Chronic pain of left knee  M25.562    G89.29       Date of onset of impairment: 2022    Subjective:   History of Present Illness:     Mechanism of injury:  Pt is a 61 yo female presenting to PT for L knee pain. Reports L TKA in  with poor results, long hx of infection. Per pt was admitted to [facility] in July for infection, then went to a SNF to continue antibiotics. Reports multiple falls in the last 6 months. Reports having a fall yesterday, toe catching on carpet. Landed on R shoulder and wasn't able to get up. Eventually was able to get up independently. Reports R shoulder soreness, but no other apparent injuries. Reports difficulty standing up from chairs, and navigating 4 steps to enter home. No handrail, but uses SPC. Uses step-to pattern  Pain:     Current pain rating:  10    At best pain ratin    At worst pain rating:  10    Quality:  Throbbing and stabbing  Social Support:     Lives in:  One-story house (4 steps to enter, no handrail)    Lives with:  Adult children (Home alone during the day)  Patient Goals:     Patient goals for therapy:  Decreased pain    Past Medical History:   Diagnosis Date    Arrhythmia     Arthritis     OA in knees    ASTHMA     Blood clotting disorder (HCC) 2018    right leg blood clot    Dental disorder     upper and lower dentures    Diabetes     Emphysema of lung ([facility])     Heart  abnormality     leakage in left valve    Heart burn     left knee    Heart valve disease     Hypertension     Infection 2017    Infection 2018    Right knee after total knee    Liver disease     Pneumonia 2020    Seizure disorder (HCC)      Past Surgical History:   Procedure Laterality Date    PB REVISE KNEE JOINT REPLACE,ALL PARTS Left 2022    Procedure: REVISION, TOTAL ARTHROPLASTY, KNEE, ALL COMPONENTS - ANTIBIOTIC SPACER;  Surgeon: Wild Luz M.D.;  Location: SURGERY [facility];  Service: Orthopedics    IRRIGATION & DEBRIDEMENT GENERAL Left 2022    Procedure: IRRIGATION AND DEBRIDEMENT, SHOULDER;  Surgeon: Obdulio Gray M.D.;  Location: SURGERY [facility];  Service: Orthopedics    PB TOTAL KNEE ARTHROPLASTY Left 2020    Procedure: ARTHROPLASTY, KNEE, TOTAL;  Surgeon: Víctor Faustin M.D.;  Location: SURGERY [facility];  Service: Orthopedics    KNEE ARTHROPLASTY TOTAL Right 2018    IRRIGATION & DEBRIDEMENT ORTHO Right 9/3/2017    Procedure: IRRIGATION & DEBRIDEMENT ORTHO, POLY EXCHANGE;  Surgeon: Jerome Russell M.D.;  Location: SURGERY [facility];  Service: Orthopedics    COLONOSCOPY WITH CLIPPING  10/28/2015    Procedure: COLONOSCOPY WITH CLIPPING;  Surgeon: Ruben Colon M.D.;  Location: ENDOSCOPY [facility];  Service:     COLONOSCOPY WITH SCLEROTHERAPY  10/28/2015    Procedure: COLONOSCOPY WITH SCLEROTHERAPY;  Surgeon: Ruben Colon M.D.;  Location: ENDOSCOPY [facility];  Service:     COLONOSCOPY WITH TATTOOING  10/28/2015    Procedure: COLONOSCOPY WITH TATTOOING;  Surgeon: Ruben Colon M.D.;  Location: ENDOSCOPY [facility];  Service:     GYN SURGERY      hysteroscoopy    GYN SURGERY  1982    tubal ligation     Social History     Tobacco Use    Smoking status: Former     Types: Cigarettes     Quit date: 2016     Years since quittin.8    Smokeless tobacco: Former     Quit date: 2021    Tobacco comments:     a few a day  when I can   Substance Use Topics    Alcohol use: No     Family and Occupational History     Socioeconomic History    Marital status: Single     Spouse name: Not on file    Number of children: Not on file    Years of education: Not on file    Highest education level: Not on file   Occupational History    Not on file       Objective     Observations   Left Knee   Positive for edema.     Additional Observation Details  Initially donning lidocaine patch.  Warm to touch. Bruising lateral aspect    Palpation     Additional Palpation Details  TTP to incision scar, diffuse TTP to L knee    Active Range of Motion   Left Knee   Flexion: 98 degrees   Extension: -9 degrees   Ambulation     Comments   Uses SPC at baseline, decreased L hip flexion    Functional Assessment     Comments  5XSTS: 32 seconds  Demo max BUE support, quad avoidant strategy      Therapeutic Exercises (CPT 98381):       Therapeutic Exercise Summary: HEP: 1x/daily  Heel slides, seated knee flexion, quad sets    Time-based treatments/modalities:           Assessment, Response and Plan:   Impairments: abnormal gait, activity intolerance, impaired functional mobility, impaired balance, lacks appropriate home exercise program, pain with function and swelling    Assessment details:  Pt is a 61 yo female presenting to PT w/ L knee pain secondary to L TKA and recent infection. Pertinent clinical findings include significant decreased L knee ROM, decreased proximal hip and quad strength as seen by sit<>stand mechanics, and altered gait kinematics. Pt is at high fall risks based on hx of falls and 5xSTS assessment. Impairments are associated w/ difficulty walking, navigating stairs, and performing basic bed mobility and transfers. The patient would benefit from skilled PT to address strength and ROM deficits in order to improve functional mobility and decrease risk of falls. The patient states she understands and agrees with the plan of care. HEP w/ handout was  reviewed and provided to the pt.   Barriers to therapy:  None  Goals:   Short Term Goals:   1. Pt will be independent with HEP 3-5x per week for improving strength, ROM and decreasing pain  2. Pt will demo proper sit<>stand mechanics and sequencing for max force production for improved functional mobility  3. Pt will complete most appropriate balance assessment  Short term goal time span:  4-6 weeks      Long Term Goals:    1. Pt will demo L knee AROM 0-120 for improved functional mobility and to navigate stairs independently  2. Pt will demo improved functional mobility w/ 5XSTS 12 seconds or better  3. Pt will demo min fall risk based on most appropriate balance assessment  4. Pt will demo global hip strength at least 4/5 or greater for improved stability  Long term goal time span:  6-8 weeks    Plan:   Therapy options:  Physical therapy treatment to continue  Planned therapy interventions:  Therapeutic Activities (CPT 40126) and Therapeutic Exercise (CPT 17678)  Other planned therapy interventions:  3 units 73941, 1 units 29001  Frequency:  2x week  Duration in visits:  16  Discussed with:  Patient    Functional Assessment Used  WOMAC Grand Total: 48.96     Referring provider co-signature:  I have reviewed this plan of care and my co-signature certifies the need for services.    Certification Period: 10/20/2022 to  12/29/22    Physician Signature: ________________________________ Date: ______________

## 2022-10-27 ENCOUNTER — APPOINTMENT (OUTPATIENT)
Dept: RADIOLOGY | Facility: MEDICAL CENTER | Age: 62
End: 2022-10-27
Attending: EMERGENCY MEDICINE
Payer: MEDICAID

## 2022-10-27 ENCOUNTER — HOSPITAL ENCOUNTER (EMERGENCY)
Facility: MEDICAL CENTER | Age: 62
End: 2022-10-27
Attending: EMERGENCY MEDICINE
Payer: MEDICAID

## 2022-10-27 VITALS
WEIGHT: 171.74 LBS | DIASTOLIC BLOOD PRESSURE: 74 MMHG | RESPIRATION RATE: 15 BRPM | TEMPERATURE: 97.2 F | HEART RATE: 65 BPM | SYSTOLIC BLOOD PRESSURE: 144 MMHG | HEIGHT: 68 IN | OXYGEN SATURATION: 96 % | BODY MASS INDEX: 26.03 KG/M2

## 2022-10-27 DIAGNOSIS — D72.819 LEUKOPENIA, UNSPECIFIED TYPE: ICD-10-CM

## 2022-10-27 DIAGNOSIS — D64.9 ANEMIA, UNSPECIFIED TYPE: ICD-10-CM
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
## 2022-10-27 DIAGNOSIS — S80.02XA CONTUSION OF LEFT KNEE, INITIAL ENCOUNTER: ICD-10-CM

## 2022-10-27 DIAGNOSIS — S80.01XA CONTUSION OF RIGHT KNEE, INITIAL ENCOUNTER: ICD-10-CM

## 2022-10-27 LAB
ALBUMIN SERPL BCP-MCNC: 2.9 G/DL (ref 3.2–4.9)
ALBUMIN/GLOB SERPL: 0.8 G/DL
ALP SERPL-CCNC: 141 U/L (ref 30–99)
ALT SERPL-CCNC: 9 U/L (ref 2–50)
ANION GAP SERPL CALC-SCNC: 8 MMOL/L (ref 7–16)
AST SERPL-CCNC: 20 U/L (ref 12–45)
BASOPHILS # BLD AUTO: 1.1 % (ref 0–1.8)
BASOPHILS # BLD: 0.04 K/UL (ref 0–0.12)
BILIRUB SERPL-MCNC: 0.4 MG/DL (ref 0.1–1.5)
BUN SERPL-MCNC: 13 MG/DL (ref 8–22)
CALCIUM SERPL-MCNC: 8.5 MG/DL (ref 8.5–10.5)
CHLORIDE SERPL-SCNC: 110 MMOL/L (ref 96–112)
CO2 SERPL-SCNC: 20 MMOL/L (ref 20–33)
CREAT SERPL-MCNC: 0.9 MG/DL (ref 0.5–1.4)
EOSINOPHIL # BLD AUTO: 0.13 K/UL (ref 0–0.51)
EOSINOPHIL NFR BLD: 3.5 % (ref 0–6.9)
ERYTHROCYTE [DISTWIDTH] IN BLOOD BY AUTOMATED COUNT: 53.6 FL (ref 35.9–50)
GFR SERPLBLD CREATININE-BSD FMLA CKD-EPI: 72 ML/MIN/1.73 M 2
GLOBULIN SER CALC-MCNC: 3.7 G/DL (ref 1.9–3.5)
GLUCOSE SERPL-MCNC: 112 MG/DL (ref 65–99)
HCT VFR BLD AUTO: 29.4 % (ref 37–47)
HGB BLD-MCNC: 9.4 G/DL (ref 12–16)
IMM GRANULOCYTES # BLD AUTO: 0.01 K/UL (ref 0–0.11)
IMM GRANULOCYTES NFR BLD AUTO: 0.3 % (ref 0–0.9)
LYMPHOCYTES # BLD AUTO: 1.24 K/UL (ref 1–4.8)
LYMPHOCYTES NFR BLD: 33.7 % (ref 22–41)
MCH RBC QN AUTO: 26.3 PG (ref 27–33)
MCHC RBC AUTO-ENTMCNC: 32 G/DL (ref 33.6–35)
MCV RBC AUTO: 82.4 FL (ref 81.4–97.8)
MONOCYTES # BLD AUTO: 0.29 K/UL (ref 0–0.85)
MONOCYTES NFR BLD AUTO: 7.9 % (ref 0–13.4)
NEUTROPHILS # BLD AUTO: 1.97 K/UL (ref 2–7.15)
NEUTROPHILS NFR BLD: 53.5 % (ref 44–72)
NRBC # BLD AUTO: 0 K/UL
NRBC BLD-RTO: 0 /100 WBC
PLATELET # BLD AUTO: 108 K/UL (ref 164–446)
PMV BLD AUTO: 9.7 FL (ref 9–12.9)
POTASSIUM SERPL-SCNC: 4.3 MMOL/L (ref 3.6–5.5)
PROT SERPL-MCNC: 6.6 G/DL (ref 6–8.2)
RBC # BLD AUTO: 3.57 M/UL (ref 4.2–5.4)
SODIUM SERPL-SCNC: 138 MMOL/L (ref 135–145)
WBC # BLD AUTO: 3.7 K/UL (ref 4.8–10.8)

## 2022-10-27 PROCEDURE — 85025 COMPLETE CBC W/AUTO DIFF WBC: CPT

## 2022-10-27 PROCEDURE — 36415 COLL VENOUS BLD VENIPUNCTURE: CPT

## 2022-10-27 PROCEDURE — 73564 X-RAY EXAM KNEE 4 OR MORE: CPT | Mod: LT

## 2022-10-27 PROCEDURE — 80053 COMPREHEN METABOLIC PANEL: CPT

## 2022-10-27 PROCEDURE — 700102 HCHG RX REV CODE 250 W/ 637 OVERRIDE(OP): Performed by: EMERGENCY MEDICINE

## 2022-10-27 PROCEDURE — 99283 EMERGENCY DEPT VISIT LOW MDM: CPT

## 2022-10-27 PROCEDURE — 73564 X-RAY EXAM KNEE 4 OR MORE: CPT | Mod: RT

## 2022-10-27 PROCEDURE — A9270 NON-COVERED ITEM OR SERVICE: HCPCS | Performed by: EMERGENCY MEDICINE

## 2022-10-27 RX ORDER — OXYCODONE HYDROCHLORIDE AND ACETAMINOPHEN 5; 325 MG/1; MG/1
1 TABLET ORAL ONCE
Status: COMPLETED | OUTPATIENT
Start: 2022-10-27 | End: 2022-10-27

## 2022-10-27 RX ORDER — HYDROCODONE BITARTRATE AND ACETAMINOPHEN 5; 325 MG/1; MG/1
1 TABLET ORAL EVERY 6 HOURS PRN
Qty: 12 TABLET | Refills: 0 | Status: SHIPPED | OUTPATIENT
Start: 2022-10-27 | End: 2022-10-31

## 2022-10-27 RX ADMIN — OXYCODONE AND ACETAMINOPHEN 1 TABLET: 5; 325 TABLET ORAL at 17:52

## 2022-10-27 ASSESSMENT — FIBROSIS 4 INDEX: FIB4 SCORE: 2.34

## 2022-10-27 NOTE — ED TRIAGE NOTES
"April [facility]  62 y.o. female  Chief Complaint   Patient presents with   • T-5000 GLF     Patient fell forward after tripping on a sidewalk onto both knees and right side. Pain in bilateral knees, R arm, R cheek. -LOC, -thinners, -ASA       Pt ambulatory to triage with steady gait for above complaint. Per patient \"I've been falling more often lately. The last time I was here they told me I'm a fall risk and that I'm supposed to be walking with a walker.\" Patient using a cane in triage. Patient states she cannot afford a walker. Patient is hard of hearing.     Pt is GCS 15, speaking in full sentences, follows commands and responds appropriately to questions. Resp are even and unlabored. Patient denies pain.     Pt placed in lobby Pt educated on triage process. Pt encouraged to alert staff for any changes.     This RN masked and in appropriate PPE during encounter.     Vitals:    10/27/22 1607   BP: 127/72   Pulse: 67   Resp: 16   Temp: 36.4 °C (97.6 °F)   SpO2: 96%      "

## 2022-10-28 NOTE — ED NOTES
Pt provided with discharge instructions. Pt verbalized understanding. Pt assisted out of ed via WC.

## 2022-10-28 NOTE — ED PROVIDER NOTES
ED Provider Note  CHIEF COMPLAINT  Chief Complaint   Patient presents with    T-5000 GLF     Patient fell forward after tripping on a sidewalk onto both knees and right side. Pain in bilateral knees, R arm, R cheek. -LOC, -thinners, -ASA       HPI  April Alicia is a 62 y.o. female who presents after a ground-level fall.  Patient fell 2 days ago.  She tripped on the sidewalk and landed on her knees and then her right side.  Patient is not on any blood thinners.  Patient does have issues ambulating and does have an unsteady gait.  She is waiting for her walker which was ordered but she has not picked it up yet.  She has a history of bilateral knee surgeries.  She states she has had increasing falls lately which is why she got the walker prescribed.  She denies any fevers.  No dizziness or syncope.  She has having pain in her right shoulder right arm right hand and right hip area.  However she has been able to ambulate.  No numbness or tingling or coolness to her legs.  She has bruising to both knees.  Denies any midline neck or low back pain.  No chest pain or trouble breathing.  No headache.  Denies hitting her head or loss of consciousness.    REVIEW OF SYSTEMS  Positive for knee pain, fall, right side pain, Negative for loss of consciousness, shortness of breath, neck or low back pain, headache, nausea or vomiting, numbness, tingling, weakness, coolness, denies recent illness.    PAST MEDICAL HISTORY   has a past medical history of Arrhythmia, Arthritis, ASTHMA, Blood clotting disorder (HCC) (2018), Dental disorder, Diabetes, Emphysema of lung (HCC), Heart abnormality, Heart burn, Heart valve disease, Hypertension, Infection (2017), Infection (2018), Liver disease, Pneumonia (2020), and Seizure disorder ([facility]).    SOCIAL HISTORY  Social History     Tobacco Use    Smoking status: Former     Types: Cigarettes     Quit date: 2016     Years since quittin.8    Smokeless tobacco: Former      "Quit date: 05/2021    Tobacco comments:     a few a day when I can   Vaping Use    Vaping Use: Never used   Substance and Sexual Activity    Alcohol use: No    Drug use: No    Sexual activity: Not on file       SURGICAL HISTORY   has a past surgical history that includes gyn surgery (1982); gyn surgery (2003); colonoscopy with clipping (10/28/2015); colonoscopy with sclerotherapy (10/28/2015); colonoscopy with tattooing (10/28/2015); irrigation & debridement ortho (Right, 9/3/2017); knee arthroplasty total (Right, 2018); total knee arthroplasty (Left, 8/24/2020); irrigation & debridement general (Left, 7/17/2022); and revise knee joint replace,all parts (Left, 7/19/2022).    CURRENT MEDICATIONS  Reviewed.  See Encounter Summary.      ALLERGIES  Allergies   Allergen Reactions    Nkda [No Known Drug Allergy]        PHYSICAL EXAM  VITAL SIGNS: /72   Pulse 67   Temp 36.4 °C (97.6 °F) (Temporal)   Resp 16   Ht 1.715 m (5' 7.5\")   Wt 77.9 kg (171 lb 11.8 oz)   LMP 06/02/2008   SpO2 96%   BMI 26.50 kg/m²   Constitutional:Alert in no apparent distress.  HENT: Normocephalic, atraumatic, no skull deformities or tenderness palpated, bilateral external ears normal. Nose normal. No facial trauma  Eyes: Pupils are equal and reactive. Conjunctiva normal, non-icteric.   Neck: Normal range of motion, no midline C-spine tenderness  Thorax & Lungs: Easy unlabored respirations, clear to auscultation throughout, no rib tenderness to palpation  Cardiovascular: Regular rate and rhythm  Abdomen:  No gross signs of peritonitis, no pain with movement   Skin: Visualized skin is  Dry, ecchymosis to bilateral knees, no other abrasions noted  Extremities:   Mild swelling and tenderness to bilateral knees, old midline scars to bilateral knees from previous surgery, normal range of motion of the right hip, able to weight-bear without pain, mild pain with movement of her right shoulder but no anterior fullness or evidence of " dislocation, normal range of motion of the elbow, some mild tenderness to the right fourth digit but no swelling or ecchymosis and able to  without pain, no other joint pain noted  Neurologic: Alert, normal strength in all extremities, normal speech, intact sensation throughout, ANO x4, normal gait with cane  Psychiatric: Affect and Mood normal    Labs  Results for orders placed or performed during the hospital encounter of 10/27/22   COMP METABOLIC PANEL   Result Value Ref Range    Sodium 138 135 - 145 mmol/L    Potassium 4.3 3.6 - 5.5 mmol/L    Chloride 110 96 - 112 mmol/L    Co2 20 20 - 33 mmol/L    Anion Gap 8.0 7.0 - 16.0    Glucose 112 (H) 65 - 99 mg/dL    Bun 13 8 - 22 mg/dL    Creatinine 0.90 0.50 - 1.40 mg/dL    Calcium 8.5 8.5 - 10.5 mg/dL    AST(SGOT) 20 12 - 45 U/L    ALT(SGPT) 9 2 - 50 U/L    Alkaline Phosphatase 141 (H) 30 - 99 U/L    Total Bilirubin 0.4 0.1 - 1.5 mg/dL    Albumin 2.9 (L) 3.2 - 4.9 g/dL    Total Protein 6.6 6.0 - 8.2 g/dL    Globulin 3.7 (H) 1.9 - 3.5 g/dL    A-G Ratio 0.8 g/dL   CBC WITH DIFFERENTIAL   Result Value Ref Range    WBC 3.7 (L) 4.8 - 10.8 K/uL    RBC 3.57 (L) 4.20 - 5.40 M/uL    Hemoglobin 9.4 (L) 12.0 - 16.0 g/dL    Hematocrit 29.4 (L) 37.0 - 47.0 %    MCV 82.4 81.4 - 97.8 fL    MCH 26.3 (L) 27.0 - 33.0 pg    MCHC 32.0 (L) 33.6 - 35.0 g/dL    RDW 53.6 (H) 35.9 - 50.0 fL    Platelet Count 108 (L) 164 - 446 K/uL    MPV 9.7 9.0 - 12.9 fL    Neutrophils-Polys 53.50 44.00 - 72.00 %    Lymphocytes 33.70 22.00 - 41.00 %    Monocytes 7.90 0.00 - 13.40 %    Eosinophils 3.50 0.00 - 6.90 %    Basophils 1.10 0.00 - 1.80 %    Immature Granulocytes 0.30 0.00 - 0.90 %    Nucleated RBC 0.00 /100 WBC    Neutrophils (Absolute) 1.97 (L) 2.00 - 7.15 K/uL    Lymphs (Absolute) 1.24 1.00 - 4.80 K/uL    Monos (Absolute) 0.29 0.00 - 0.85 K/uL    Eos (Absolute) 0.13 0.00 - 0.51 K/uL    Baso (Absolute) 0.04 0.00 - 0.12 K/uL    Immature Granulocytes (abs) 0.01 0.00 - 0.11 K/uL    NRBC (Absolute)  0.00 K/uL   ESTIMATED GFR   Result Value Ref Range    GFR (CKD-EPI) 72 >60 mL/min/1.73 m 2      Radiology  DX-KNEE COMPLETE 4+ RIGHT   Final Result      1.  Negative for right knee fracture or malalignment      2.  Right knee arthroplasty within normal limits      DX-KNEE COMPLETE 4+ LEFT   Final Result      1.  Negative for fracture or malalignment      2.  Left knee arthroplasty that is likely a revision            COURSE & MEDICAL DECISION MAKING  Nursing notes and vital signs were reviewed. (See chart for details)    The patient presents to the Emergency Department with pain and bilateral knees from a fall.  History does not suggest syncope.  No head trauma.  No loss of consciousness.  Not on anticoagulation.  No neck or back pain.  No hypoxia or respiratory distress.  No chest pain.  Contusions noted to the right arm.  However normal range of motion although she does have some slight pain and I do not suspect fracture.  We discussed imaging but at this point its her knees are causing her most of her pain.  We will check laboratory studies.  I do not feel she needs CT imaging of her head at this time as there is no evidence of head trauma.    X-rays have returned and show no acute fracture.  Labs show a low white count.  This appears chronic for the patient since July.  Patient is also anemic and this is slightly improved from previous levels.  I do not see any evidence of acute changes in her labs.  I believe she stable for outpatient management.  She promised me she will go get her walker.  She will follow-up with her orthopedic surgeon for recheck of her knees.  I did advise patient that she develops fever or has any weakness or worsening symptoms she needs to return for recheck in 12 to 24 hours due to her low white count.  She has not had any fevers here and overall looks well at discharge.    In prescribing controlled substances to this patient, I certify that I have obtained and reviewed the medical  history of April Alicia. I have also made a good lyle effort to obtain applicable records from other providers who have treated the patient and records did not demonstrate any increased risk of substance abuse that would prevent me from prescribing controlled substances.     I have conducted a physical exam and documented it. I have reviewed Ms. Alicia’s prescription history as maintained by the [place] Prescription Monitoring Program.     I have assessed the patient’s risk for abuse, dependency, and addiction using the validated Opioid Risk Tool available at https://www.mdcalc.com/clkfyp-sjzv-mvme-ort-narcotic-abuse.     Given the above, I believe the benefits of controlled substance therapy outweigh the risks. The reasons for prescribing controlled substances include non-narcotic, oral analgesic alternatives have been inadequate for pain control. Accordingly, I have discussed the risk and benefits, treatment plan, and alternative therapies with the patient.     The patient verbally agreed to the discharge precautions and follow-up plan which is documented in EPIC.    FINAL IMPRESSION  1. Fall, initial encounter        2. Contusion of right knee, initial encounter  HYDROcodone-acetaminophen (NORCO) 5-325 MG Tab per tablet      3. Contusion of left knee, initial encounter        4. Leukopenia, unspecified type        5. Anemia, unspecified type

## 2022-10-28 NOTE — DISCHARGE INSTRUCTIONS
Ice your knees.  Take the pain medication as directed.  Follow-up with your regular doctor.  If you feel unwell, or develop a fever, return for recheck.  Try to follow-up with your knee surgeon.  Make sure you go  your walker as I think it will help you getting around and avoid falling.

## 2022-11-14 ENCOUNTER — APPOINTMENT (OUTPATIENT)
Dept: PHYSICAL THERAPY | Facility: REHABILITATION | Age: 62
End: 2022-11-14
Attending: PHYSICIAN ASSISTANT
Payer: MEDICAID

## 2022-11-22 ENCOUNTER — APPOINTMENT (OUTPATIENT)
Dept: PHYSICAL THERAPY | Facility: REHABILITATION | Age: 62
End: 2022-11-22
Attending: PHYSICIAN ASSISTANT
Payer: MEDICAID

## 2022-12-01 ENCOUNTER — APPOINTMENT (OUTPATIENT)
Dept: PHYSICAL THERAPY | Facility: REHABILITATION | Age: 62
End: 2022-12-01
Attending: PHYSICIAN ASSISTANT
Payer: MEDICAID

## 2022-12-12 ENCOUNTER — PHYSICAL THERAPY (OUTPATIENT)
Dept: PHYSICAL THERAPY | Facility: REHABILITATION | Age: 62
End: 2022-12-12
Attending: PHYSICIAN ASSISTANT
Payer: MEDICAID

## 2022-12-12 DIAGNOSIS — G89.29 CHRONIC PAIN OF LEFT KNEE: ICD-10-CM

## 2022-12-12 DIAGNOSIS — M25.562 CHRONIC PAIN OF LEFT KNEE: ICD-10-CM

## 2022-12-12 PROCEDURE — 97530 THERAPEUTIC ACTIVITIES: CPT

## 2022-12-12 PROCEDURE — 97110 THERAPEUTIC EXERCISES: CPT

## 2022-12-12 NOTE — OP THERAPY DAILY TREATMENT
"  Outpatient Physical Therapy  DAILY TREATMENT     [facility] Physical [address]  [address]  [place] [address]  Phone:  [phone]  Fax:  [phone]    Date: 12/12/2022    Patient: April Alicia  YOB: 1960  MRN: [id number]     Time Calculation    Start time: 1401  Stop time: 1445 Time Calculation (min): 44 minutes         Chief Complaint: Knee Problem    Visit #: 2    SUBJECTIVE:  Pt reports no changes in symptoms. Has not been compliant with exercises d/t depression. Reports miscommunication w/ ortho, thought she was suppose to \"stay off of knee\".    OBJECTIVE:  Current objective measures:   L knee ROM  Flex: 110          Therapeutic Exercises (CPT 79229):     1. SLR, x10, Added to HEP    2. SB heel slides, x2.5', VCs to achieve full end-range    3. Bridges, 10x3\" hold, Added to HEP    4. LAQ, 10x5\" hold, Added to HEP      Therapeutic Exercise Summary: HEP: 1x/daily  Heel slides, seated knee flexion, quad sets    Therapeutic Treatments and Modalities:     1. Therapeutic Activities (CPT 36337)    Therapeutic Treatment and Modalities Summary: Ther Act:  -Heel raises, x12 - Added to HEP  -Step ups 4\", x12  -Lat step ups, x12  -Floor transfer x1, qped to 1/2 kneel  -Sit<>stand 23\" seat height, 2x10, staggered stance, L bias  Time-based treatments/modalities:    Physical Therapy Timed Treatment Charges  Therapeutic activity minutes (CPT 57093): 23 minutes  Therapeutic exercise minutes (CPT 70898): 21 minutes        ASSESSMENT:   Response to treatment: Progressed functional strengthening exercises. Pt demo significant R side proximal hip/LE weakness, affecting gait/step kinematics. Demo mod quad avoidant strategies during sit<>stand and step activities. Improved with VCs. Pt demo high fear avoidant behaviors. Pt ed conducted regarding activity dosing, safe to be as active as tolerated. Pt gave verbal understanding. Progressed HEP to reflect new exercises, handout " provided.    PLAN/RECOMMENDATIONS:   Plan for treatment: therapy treatment to continue next visit.  Planned interventions for next visit: continue with current treatment.

## 2022-12-14 ENCOUNTER — APPOINTMENT (OUTPATIENT)
Dept: PHYSICAL THERAPY | Facility: REHABILITATION | Age: 62
End: 2022-12-14
Attending: PHYSICIAN ASSISTANT
Payer: MEDICAID

## 2022-12-19 ENCOUNTER — PHYSICAL THERAPY (OUTPATIENT)
Dept: PHYSICAL THERAPY | Facility: REHABILITATION | Age: 62
End: 2022-12-19
Attending: PHYSICIAN ASSISTANT
Payer: MEDICAID

## 2022-12-19 DIAGNOSIS — M25.562 CHRONIC PAIN OF LEFT KNEE: ICD-10-CM

## 2022-12-19 DIAGNOSIS — G89.29 CHRONIC PAIN OF LEFT KNEE: ICD-10-CM

## 2022-12-19 PROCEDURE — 97530 THERAPEUTIC ACTIVITIES: CPT

## 2022-12-19 PROCEDURE — 97110 THERAPEUTIC EXERCISES: CPT

## 2022-12-19 NOTE — OP THERAPY DAILY TREATMENT
"  Outpatient Physical Therapy  DAILY TREATMENT     [facility] Physical [address]  [address]  [place] [address]  Phone:  [phone]  Fax:  [phone]    Date: 12/19/2022    Patient: April Alicia  YOB: 1960  MRN: [id number]     Time Calculation    Start time: 1402  Stop time: 1444 Time Calculation (min): 42 minutes         Chief Complaint: Knee Problem    Visit #: 3    SUBJECTIVE:  Pt reports L knee has been sore and painful. Reports using treadmill over the weekend with her daughter.    OBJECTIVE:  Current objective measures:   L knee flex: 111 degrees          Therapeutic Exercises (CPT 62726):     1. Nustep L4, x5'    2. SLR, x15 ea    3. Bridges, 10x3\" hold    4. Heel slides, x2', Added to HEP    5. SB heel slides, x2'      Therapeutic Exercise Summary: HEP: 1x/daily  Heel slides, seated knee flexion, quad sets    Therapeutic Treatments and Modalities:     1. Therapeutic Activities (CPT 02604)    Therapeutic Treatment and Modalities Summary: Ther Act:  -Heel raises, x12  -Step ups 4\", x15 (trialed w/ 6\", unable to complete d/t weakness)  -Step up and overs 4\", x10  -Lat step downs 2\", x15 - unable to complete w/o Trendelenburg compensation  -Lat step ups 4\", x15  -Sit<>stand 23\" seat height, 2x10, staggered stance, L bias - not today  -Shuttle L4->L5, x20 total  -SL shuttle L3, x10  Time-based treatments/modalities:    Physical Therapy Timed Treatment Charges  Therapeutic activity minutes (CPT 90123): 27 minutes  Therapeutic exercise minutes (CPT 29205): 15 minutes        ASSESSMENT:   Response to treatment: Progressed functional strengthening exercises. Pt tolerated exercises well. Demo difficulty performing step down exercise d/t weakness and limited flexion ROM. Demo bilat glute med weakness, L>>R. Reviewed HEP, reprinted heel slide exercises.    PLAN/RECOMMENDATIONS:   Plan for treatment: therapy treatment to continue next visit.  Planned interventions for " next visit: continue with current treatment.

## 2022-12-21 ENCOUNTER — APPOINTMENT (OUTPATIENT)
Dept: PHYSICAL THERAPY | Facility: REHABILITATION | Age: 62
End: 2022-12-21
Attending: PHYSICIAN ASSISTANT
Payer: MEDICAID

## 2022-12-26 ENCOUNTER — APPOINTMENT (OUTPATIENT)
Dept: RADIOLOGY | Facility: MEDICAL CENTER | Age: 62
DRG: 486 | End: 2022-12-26
Attending: EMERGENCY MEDICINE
Payer: MEDICAID

## 2022-12-26 ENCOUNTER — HOSPITAL ENCOUNTER (INPATIENT)
Facility: MEDICAL CENTER | Age: 62
LOS: 15 days | DRG: 486 | End: 2023-01-10
Attending: EMERGENCY MEDICINE | Admitting: STUDENT IN AN ORGANIZED HEALTH CARE EDUCATION/TRAINING PROGRAM
Payer: MEDICAID

## 2022-12-26 DIAGNOSIS — E43 PROTEIN-CALORIE MALNUTRITION, SEVERE (HCC): ICD-10-CM

## 2022-12-26 DIAGNOSIS — E11.65 CONTROLLED TYPE 2 DIABETES MELLITUS WITH HYPERGLYCEMIA, WITHOUT LONG-TERM CURRENT USE OF INSULIN (HCC): ICD-10-CM

## 2022-12-26 DIAGNOSIS — M00.262 STREPTOCOCCAL ARTHRITIS OF LEFT KNEE (HCC): ICD-10-CM

## 2022-12-26 DIAGNOSIS — B18.2 CHRONIC HEPATITIS C WITHOUT HEPATIC COMA (HCC): ICD-10-CM

## 2022-12-26 DIAGNOSIS — N17.9 AKI (ACUTE KIDNEY INJURY) (HCC): ICD-10-CM

## 2022-12-26 DIAGNOSIS — G43.109 MIGRAINE WITH AURA AND WITHOUT STATUS MIGRAINOSUS, NOT INTRACTABLE: ICD-10-CM

## 2022-12-26 PROBLEM — M00.9 SEPTIC JOINT (HCC): Status: ACTIVE | Noted: 2022-12-26

## 2022-12-26 LAB
ALBUMIN SERPL BCP-MCNC: 3 G/DL (ref 3.2–4.9)
ALBUMIN/GLOB SERPL: 0.9 G/DL
ALP SERPL-CCNC: 126 U/L (ref 30–99)
ALT SERPL-CCNC: 22 U/L (ref 2–50)
AMMONIA PLAS-SCNC: 43 UMOL/L (ref 11–45)
ANION GAP SERPL CALC-SCNC: 8 MMOL/L (ref 7–16)
ANISOCYTOSIS BLD QL SMEAR: ABNORMAL
APPEARANCE UR: ABNORMAL
AST SERPL-CCNC: 26 U/L (ref 12–45)
BACTERIA #/AREA URNS HPF: ABNORMAL /HPF
BASOPHILS # BLD AUTO: 1 % (ref 0–1.8)
BASOPHILS # BLD: 0.07 K/UL (ref 0–0.12)
BILIRUB SERPL-MCNC: 0.9 MG/DL (ref 0.1–1.5)
BILIRUB UR QL STRIP.AUTO: NEGATIVE
BUN SERPL-MCNC: 25 MG/DL (ref 8–22)
CALCIUM ALBUM COR SERPL-MCNC: 9.2 MG/DL (ref 8.5–10.5)
CALCIUM SERPL-MCNC: 8.4 MG/DL (ref 8.5–10.5)
CHLORIDE SERPL-SCNC: 103 MMOL/L (ref 96–112)
CO2 SERPL-SCNC: 19 MMOL/L (ref 20–33)
COLOR UR: YELLOW
CREAT SERPL-MCNC: 0.84 MG/DL (ref 0.5–1.4)
CRP SERPL HS-MCNC: 10.5 MG/DL (ref 0–0.75)
DOHLE BOD BLD QL SMEAR: NORMAL
EOSINOPHIL # BLD AUTO: 0 K/UL (ref 0–0.51)
EOSINOPHIL NFR BLD: 0 % (ref 0–6.9)
EPI CELLS #/AREA URNS HPF: ABNORMAL /HPF
ERYTHROCYTE [DISTWIDTH] IN BLOOD BY AUTOMATED COUNT: 57.9 FL (ref 35.9–50)
ETHANOL BLD-MCNC: <10.1 MG/DL
FLUAV RNA SPEC QL NAA+PROBE: NEGATIVE
FLUBV RNA SPEC QL NAA+PROBE: NEGATIVE
GFR SERPLBLD CREATININE-BSD FMLA CKD-EPI: 78 ML/MIN/1.73 M 2
GLOBULIN SER CALC-MCNC: 3.4 G/DL (ref 1.9–3.5)
GLUCOSE SERPL-MCNC: 128 MG/DL (ref 65–99)
GLUCOSE UR STRIP.AUTO-MCNC: NEGATIVE MG/DL
HCT VFR BLD AUTO: 30.7 % (ref 37–47)
HGB BLD-MCNC: 9.8 G/DL (ref 12–16)
HYALINE CASTS #/AREA URNS LPF: ABNORMAL /LPF
HYPOCHROMIA BLD QL SMEAR: ABNORMAL
KETONES UR STRIP.AUTO-MCNC: NEGATIVE MG/DL
LACTATE SERPL-SCNC: 1.6 MMOL/L (ref 0.5–2)
LEUKOCYTE ESTERASE UR QL STRIP.AUTO: ABNORMAL
LYMPHOCYTES # BLD AUTO: 0.7 K/UL (ref 1–4.8)
LYMPHOCYTES NFR BLD: 10 % (ref 22–41)
MANUAL DIFF BLD: ABNORMAL
MCH RBC QN AUTO: 28.7 PG (ref 27–33)
MCHC RBC AUTO-ENTMCNC: 31.9 G/DL (ref 33.6–35)
MCV RBC AUTO: 90 FL (ref 81.4–97.8)
MICRO URNS: ABNORMAL
MICROCYTES BLD QL SMEAR: ABNORMAL
MONOCYTES # BLD AUTO: 0.35 K/UL (ref 0–0.85)
MONOCYTES NFR BLD AUTO: 5 % (ref 0–13.4)
MORPHOLOGY BLD-IMP: NORMAL
NEUTROPHILS # BLD AUTO: 5.88 K/UL (ref 2–7.15)
NEUTROPHILS NFR BLD: 62 % (ref 44–72)
NEUTS BAND NFR BLD MANUAL: 22 % (ref 0–10)
NITRITE UR QL STRIP.AUTO: POSITIVE
NRBC # BLD AUTO: 0 K/UL
NRBC BLD-RTO: 0 /100 WBC
OVALOCYTES BLD QL SMEAR: NORMAL
PH UR STRIP.AUTO: 6 [PH] (ref 5–8)
PLATELET # BLD AUTO: 58 K/UL (ref 164–446)
PLATELET BLD QL SMEAR: NORMAL
PMV BLD AUTO: 10.1 FL (ref 9–12.9)
POIKILOCYTOSIS BLD QL SMEAR: NORMAL
POLYCHROMASIA BLD QL SMEAR: NORMAL
POTASSIUM SERPL-SCNC: 4.3 MMOL/L (ref 3.6–5.5)
PROT SERPL-MCNC: 6.4 G/DL (ref 6–8.2)
PROT UR QL STRIP: 100 MG/DL
RBC # BLD AUTO: 3.41 M/UL (ref 4.2–5.4)
RBC # URNS HPF: >150 /HPF
RBC BLD AUTO: PRESENT
RBC UR QL AUTO: ABNORMAL
RSV RNA SPEC QL NAA+PROBE: NEGATIVE
SARS-COV-2 RNA RESP QL NAA+PROBE: NOTDETECTED
SODIUM SERPL-SCNC: 130 MMOL/L (ref 135–145)
SP GR UR STRIP.AUTO: 1.02
SPECIMEN SOURCE: NORMAL
TOXIC GRANULES BLD QL SMEAR: NORMAL
UROBILINOGEN UR STRIP.AUTO-MCNC: 1 MG/DL
WBC # BLD AUTO: 7 K/UL (ref 4.8–10.8)
WBC #/AREA URNS HPF: ABNORMAL /HPF

## 2022-12-26 PROCEDURE — 82945 GLUCOSE OTHER FLUID: CPT

## 2022-12-26 PROCEDURE — 700102 HCHG RX REV CODE 250 W/ 637 OVERRIDE(OP): Performed by: EMERGENCY MEDICINE

## 2022-12-26 PROCEDURE — 84157 ASSAY OF PROTEIN OTHER: CPT

## 2022-12-26 PROCEDURE — 96365 THER/PROPH/DIAG IV INF INIT: CPT

## 2022-12-26 PROCEDURE — 87205 SMEAR GRAM STAIN: CPT

## 2022-12-26 PROCEDURE — 36415 COLL VENOUS BLD VENIPUNCTURE: CPT

## 2022-12-26 PROCEDURE — 96375 TX/PRO/DX INJ NEW DRUG ADDON: CPT

## 2022-12-26 PROCEDURE — 83605 ASSAY OF LACTIC ACID: CPT

## 2022-12-26 PROCEDURE — 73562 X-RAY EXAM OF KNEE 3: CPT | Mod: LT

## 2022-12-26 PROCEDURE — 85025 COMPLETE CBC W/AUTO DIFF WBC: CPT

## 2022-12-26 PROCEDURE — 85652 RBC SED RATE AUTOMATED: CPT

## 2022-12-26 PROCEDURE — 87040 BLOOD CULTURE FOR BACTERIA: CPT

## 2022-12-26 PROCEDURE — 700111 HCHG RX REV CODE 636 W/ 250 OVERRIDE (IP): Performed by: EMERGENCY MEDICINE

## 2022-12-26 PROCEDURE — 700105 HCHG RX REV CODE 258: Performed by: EMERGENCY MEDICINE

## 2022-12-26 PROCEDURE — 82140 ASSAY OF AMMONIA: CPT

## 2022-12-26 PROCEDURE — 20610 DRAIN/INJ JOINT/BURSA W/O US: CPT

## 2022-12-26 PROCEDURE — 81001 URINALYSIS AUTO W/SCOPE: CPT

## 2022-12-26 PROCEDURE — 85007 BL SMEAR W/DIFF WBC COUNT: CPT

## 2022-12-26 PROCEDURE — 87186 SC STD MICRODIL/AGAR DIL: CPT

## 2022-12-26 PROCEDURE — 82077 ASSAY SPEC XCP UR&BREATH IA: CPT

## 2022-12-26 PROCEDURE — 71045 X-RAY EXAM CHEST 1 VIEW: CPT

## 2022-12-26 PROCEDURE — 99285 EMERGENCY DEPT VISIT HI MDM: CPT

## 2022-12-26 PROCEDURE — 80307 DRUG TEST PRSMV CHEM ANLYZR: CPT

## 2022-12-26 PROCEDURE — 0241U HCHG SARS-COV-2 COVID-19 NFCT DS RESP RNA 4 TRGT MIC: CPT

## 2022-12-26 PROCEDURE — 87077 CULTURE AEROBIC IDENTIFY: CPT

## 2022-12-26 PROCEDURE — 86140 C-REACTIVE PROTEIN: CPT

## 2022-12-26 PROCEDURE — C9803 HOPD COVID-19 SPEC COLLECT: HCPCS | Performed by: EMERGENCY MEDICINE

## 2022-12-26 PROCEDURE — 80053 COMPREHEN METABOLIC PANEL: CPT

## 2022-12-26 PROCEDURE — 99221 1ST HOSP IP/OBS SF/LOW 40: CPT | Mod: GC | Performed by: STUDENT IN AN ORGANIZED HEALTH CARE EDUCATION/TRAINING PROGRAM

## 2022-12-26 PROCEDURE — 89051 BODY FLUID CELL COUNT: CPT

## 2022-12-26 PROCEDURE — A9270 NON-COVERED ITEM OR SERVICE: HCPCS | Performed by: EMERGENCY MEDICINE

## 2022-12-26 PROCEDURE — 770001 HCHG ROOM/CARE - MED/SURG/GYN PRIV*

## 2022-12-26 PROCEDURE — 87070 CULTURE OTHR SPECIMN AEROBIC: CPT

## 2022-12-26 PROCEDURE — 87086 URINE CULTURE/COLONY COUNT: CPT

## 2022-12-26 RX ORDER — ACETAMINOPHEN 325 MG/1
650 TABLET ORAL EVERY 6 HOURS PRN
Status: DISCONTINUED | OUTPATIENT
Start: 2022-12-26 | End: 2023-01-10 | Stop reason: HOSPADM

## 2022-12-26 RX ORDER — ENOXAPARIN SODIUM 100 MG/ML
40 INJECTION SUBCUTANEOUS DAILY
Status: DISCONTINUED | OUTPATIENT
Start: 2022-12-27 | End: 2022-12-30

## 2022-12-26 RX ORDER — MORPHINE SULFATE 4 MG/ML
4 INJECTION INTRAVENOUS ONCE
Status: COMPLETED | OUTPATIENT
Start: 2022-12-26 | End: 2022-12-26

## 2022-12-26 RX ORDER — BISACODYL 10 MG
10 SUPPOSITORY, RECTAL RECTAL
Status: DISCONTINUED | OUTPATIENT
Start: 2022-12-26 | End: 2023-01-10 | Stop reason: HOSPADM

## 2022-12-26 RX ORDER — OXYCODONE HYDROCHLORIDE 5 MG/1
5 TABLET ORAL ONCE
Status: COMPLETED | OUTPATIENT
Start: 2022-12-26 | End: 2022-12-26

## 2022-12-26 RX ORDER — NICOTINE 21 MG/24HR
14 PATCH, TRANSDERMAL 24 HOURS TRANSDERMAL
Status: DISCONTINUED | OUTPATIENT
Start: 2022-12-27 | End: 2023-01-10 | Stop reason: HOSPADM

## 2022-12-26 RX ORDER — AMOXICILLIN 250 MG
2 CAPSULE ORAL 2 TIMES DAILY
Status: DISCONTINUED | OUTPATIENT
Start: 2022-12-27 | End: 2023-01-10 | Stop reason: HOSPADM

## 2022-12-26 RX ORDER — POLYETHYLENE GLYCOL 3350 17 G/17G
1 POWDER, FOR SOLUTION ORAL
Status: DISCONTINUED | OUTPATIENT
Start: 2022-12-26 | End: 2023-01-10 | Stop reason: HOSPADM

## 2022-12-26 RX ADMIN — MORPHINE SULFATE 4 MG: 4 INJECTION INTRAVENOUS at 22:06

## 2022-12-26 RX ADMIN — OXYCODONE 5 MG: 5 TABLET ORAL at 21:37

## 2022-12-26 RX ADMIN — SODIUM CHLORIDE 3 G: 900 INJECTION INTRAVENOUS at 23:04

## 2022-12-26 ASSESSMENT — FIBROSIS 4 INDEX: FIB4 SCORE: 3.83

## 2022-12-27 PROBLEM — R41.82 ALTERED MENTAL STATE: Status: ACTIVE | Noted: 2022-12-27

## 2022-12-27 PROBLEM — D63.8 ANEMIA OF CHRONIC DISEASE: Status: ACTIVE | Noted: 2022-12-27

## 2022-12-27 PROBLEM — K21.9 GERD (GASTROESOPHAGEAL REFLUX DISEASE): Status: ACTIVE | Noted: 2022-12-27

## 2022-12-27 PROBLEM — R82.71 ASYMPTOMATIC BACTERIURIA: Status: ACTIVE | Noted: 2022-12-27

## 2022-12-27 PROBLEM — E11.9 TYPE 2 DIABETES MELLITUS (HCC): Status: ACTIVE | Noted: 2022-12-27

## 2022-12-27 PROBLEM — G93.40 ACUTE ENCEPHALOPATHY: Status: ACTIVE | Noted: 2022-12-27

## 2022-12-27 PROBLEM — K76.82 HEPATIC ENCEPHALOPATHY (HCC): Status: ACTIVE | Noted: 2022-12-27

## 2022-12-27 PROBLEM — R09.89 ABNORMAL FINDING OF LUNG: Status: ACTIVE | Noted: 2022-12-27

## 2022-12-27 LAB
AMPHET UR QL SCN: NEGATIVE
ANION GAP SERPL CALC-SCNC: 10 MMOL/L (ref 7–16)
ANISOCYTOSIS BLD QL SMEAR: ABNORMAL
APPEARANCE FLD: NORMAL
BARBITURATES UR QL SCN: NEGATIVE
BASOPHILS # BLD AUTO: 0 % (ref 0–1.8)
BASOPHILS # BLD: 0 K/UL (ref 0–0.12)
BENZODIAZ UR QL SCN: NEGATIVE
BODY FLD TYPE: NORMAL
BODY FLD TYPE: NORMAL
BUN SERPL-MCNC: 24 MG/DL (ref 8–22)
BZE UR QL SCN: NEGATIVE
CALCIUM SERPL-MCNC: 8.1 MG/DL (ref 8.5–10.5)
CANNABINOIDS UR QL SCN: NEGATIVE
CHLORIDE SERPL-SCNC: 103 MMOL/L (ref 96–112)
CO2 SERPL-SCNC: 17 MMOL/L (ref 20–33)
COLOR FLD: NORMAL
CREAT SERPL-MCNC: 0.69 MG/DL (ref 0.5–1.4)
EOSINOPHIL # BLD AUTO: 0.05 K/UL (ref 0–0.51)
EOSINOPHIL NFR BLD: 0.7 % (ref 0–6.9)
ERYTHROCYTE [DISTWIDTH] IN BLOOD BY AUTOMATED COUNT: 57.8 FL (ref 35.9–50)
ERYTHROCYTE [SEDIMENTATION RATE] IN BLOOD BY WESTERGREN METHOD: 31 MM/HOUR (ref 0–25)
GFR SERPLBLD CREATININE-BSD FMLA CKD-EPI: 98 ML/MIN/1.73 M 2
GLUCOSE BLD STRIP.AUTO-MCNC: 110 MG/DL (ref 65–99)
GLUCOSE BLD STRIP.AUTO-MCNC: 118 MG/DL (ref 65–99)
GLUCOSE BLD STRIP.AUTO-MCNC: 133 MG/DL (ref 65–99)
GLUCOSE FLD-MCNC: <2 MG/DL
GLUCOSE SERPL-MCNC: 114 MG/DL (ref 65–99)
GRAM STN SPEC: NORMAL
HCT VFR BLD AUTO: 29.8 % (ref 37–47)
HGB BLD-MCNC: 9.8 G/DL (ref 12–16)
HISTIOCYTES NFR FLD: 0 %
LACTATE SERPL-SCNC: 1.9 MMOL/L (ref 0.5–2)
LYMPHOCYTES # BLD AUTO: 0.53 K/UL (ref 1–4.8)
LYMPHOCYTES NFR BLD: 7.4 % (ref 22–41)
LYMPHOCYTES NFR FLD: 10 %
MAGNESIUM SERPL-MCNC: 1.4 MG/DL (ref 1.5–2.5)
MANUAL DIFF BLD: ABNORMAL
MCH RBC QN AUTO: 29.4 PG (ref 27–33)
MCHC RBC AUTO-ENTMCNC: 32.9 G/DL (ref 33.6–35)
MCV RBC AUTO: 89.5 FL (ref 81.4–97.8)
METAMYELOCYTES NFR BLD MANUAL: 8.1 %
METHADONE UR QL SCN: NEGATIVE
MICROCYTES BLD QL SMEAR: ABNORMAL
MONOCYTES # BLD AUTO: 0.21 K/UL (ref 0–0.85)
MONOCYTES NFR BLD AUTO: 2.9 % (ref 0–13.4)
MONONUC CELLS NFR FLD: 4 %
MORPHOLOGY BLD-IMP: NORMAL
NEUTROPHILS # BLD AUTO: 5.74 K/UL (ref 2–7.15)
NEUTROPHILS NFR BLD: 69.9 % (ref 44–72)
NEUTROPHILS NFR FLD: 86 %
NEUTS BAND NFR BLD MANUAL: 11 % (ref 0–10)
NRBC # BLD AUTO: 0 K/UL
NRBC BLD-RTO: 0 /100 WBC
OPIATES UR QL SCN: NEGATIVE
OVALOCYTES BLD QL SMEAR: NORMAL
OXYCODONE UR QL SCN: POSITIVE
PCP UR QL SCN: NEGATIVE
PLATELET # BLD AUTO: 59 K/UL (ref 164–446)
PLATELET BLD QL SMEAR: NORMAL
PMV BLD AUTO: 10.7 FL (ref 9–12.9)
POIKILOCYTOSIS BLD QL SMEAR: NORMAL
POTASSIUM SERPL-SCNC: 4.1 MMOL/L (ref 3.6–5.5)
PROPOXYPH UR QL SCN: NEGATIVE
PROT FLD-MCNC: 4.6 G/DL
RBC # BLD AUTO: 3.33 M/UL (ref 4.2–5.4)
RBC # FLD: NORMAL CELLS/UL
RBC BLD AUTO: PRESENT
SIGNIFICANT IND 70042: NORMAL
SITE SITE: NORMAL
SODIUM SERPL-SCNC: 130 MMOL/L (ref 135–145)
SOURCE SOURCE: NORMAL
WBC # BLD AUTO: 7.1 K/UL (ref 4.8–10.8)
WBC # FLD: NORMAL CELLS/UL

## 2022-12-27 PROCEDURE — 36415 COLL VENOUS BLD VENIPUNCTURE: CPT

## 2022-12-27 PROCEDURE — 700111 HCHG RX REV CODE 636 W/ 250 OVERRIDE (IP): Performed by: INTERNAL MEDICINE

## 2022-12-27 PROCEDURE — 99233 SBSQ HOSP IP/OBS HIGH 50: CPT | Performed by: INTERNAL MEDICINE

## 2022-12-27 PROCEDURE — 770001 HCHG ROOM/CARE - MED/SURG/GYN PRIV*

## 2022-12-27 PROCEDURE — 80048 BASIC METABOLIC PNL TOTAL CA: CPT

## 2022-12-27 PROCEDURE — 700102 HCHG RX REV CODE 250 W/ 637 OVERRIDE(OP): Performed by: STUDENT IN AN ORGANIZED HEALTH CARE EDUCATION/TRAINING PROGRAM

## 2022-12-27 PROCEDURE — 82962 GLUCOSE BLOOD TEST: CPT | Mod: 91

## 2022-12-27 PROCEDURE — 83605 ASSAY OF LACTIC ACID: CPT

## 2022-12-27 PROCEDURE — 0S9D3ZX DRAINAGE OF LEFT KNEE JOINT, PERCUTANEOUS APPROACH, DIAGNOSTIC: ICD-10-PCS | Performed by: EMERGENCY MEDICINE

## 2022-12-27 PROCEDURE — A9270 NON-COVERED ITEM OR SERVICE: HCPCS | Performed by: INTERNAL MEDICINE

## 2022-12-27 PROCEDURE — 700111 HCHG RX REV CODE 636 W/ 250 OVERRIDE (IP): Performed by: STUDENT IN AN ORGANIZED HEALTH CARE EDUCATION/TRAINING PROGRAM

## 2022-12-27 PROCEDURE — 83735 ASSAY OF MAGNESIUM: CPT

## 2022-12-27 PROCEDURE — 700105 HCHG RX REV CODE 258: Performed by: EMERGENCY MEDICINE

## 2022-12-27 PROCEDURE — A9270 NON-COVERED ITEM OR SERVICE: HCPCS | Performed by: STUDENT IN AN ORGANIZED HEALTH CARE EDUCATION/TRAINING PROGRAM

## 2022-12-27 PROCEDURE — 84630 ASSAY OF ZINC: CPT

## 2022-12-27 PROCEDURE — 700111 HCHG RX REV CODE 636 W/ 250 OVERRIDE (IP): Performed by: EMERGENCY MEDICINE

## 2022-12-27 PROCEDURE — 85007 BL SMEAR W/DIFF WBC COUNT: CPT

## 2022-12-27 PROCEDURE — 700102 HCHG RX REV CODE 250 W/ 637 OVERRIDE(OP): Performed by: INTERNAL MEDICINE

## 2022-12-27 PROCEDURE — 85025 COMPLETE CBC W/AUTO DIFF WBC: CPT

## 2022-12-27 PROCEDURE — 87641 MR-STAPH DNA AMP PROBE: CPT

## 2022-12-27 PROCEDURE — 700105 HCHG RX REV CODE 258: Performed by: STUDENT IN AN ORGANIZED HEALTH CARE EDUCATION/TRAINING PROGRAM

## 2022-12-27 RX ORDER — OMEPRAZOLE 20 MG/1
20 CAPSULE, DELAYED RELEASE ORAL EVERY 12 HOURS
Status: DISCONTINUED | OUTPATIENT
Start: 2022-12-27 | End: 2023-01-10 | Stop reason: HOSPADM

## 2022-12-27 RX ORDER — FELODIPINE 5 MG/1
5 TABLET, EXTENDED RELEASE ORAL EVERY EVENING
Status: DISCONTINUED | OUTPATIENT
Start: 2022-12-27 | End: 2023-01-05

## 2022-12-27 RX ORDER — TIOTROPIUM BROMIDE 18 UG/1
1 CAPSULE ORAL; RESPIRATORY (INHALATION) DAILY
COMMUNITY
Start: 2022-12-20

## 2022-12-27 RX ORDER — OXYCODONE HYDROCHLORIDE 10 MG/1
10 TABLET ORAL
Status: DISCONTINUED | OUTPATIENT
Start: 2022-12-27 | End: 2022-12-30

## 2022-12-27 RX ORDER — FUROSEMIDE 40 MG/1
20 TABLET ORAL DAILY
Status: DISCONTINUED | OUTPATIENT
Start: 2022-12-27 | End: 2022-12-27

## 2022-12-27 RX ORDER — ATORVASTATIN CALCIUM 20 MG/1
20 TABLET, FILM COATED ORAL NIGHTLY
Status: DISCONTINUED | OUTPATIENT
Start: 2022-12-27 | End: 2023-01-10 | Stop reason: HOSPADM

## 2022-12-27 RX ORDER — SODIUM CHLORIDE 9 MG/ML
INJECTION, SOLUTION INTRAVENOUS CONTINUOUS
Status: DISCONTINUED | OUTPATIENT
Start: 2022-12-27 | End: 2022-12-28

## 2022-12-27 RX ORDER — LACTULOSE 20 G/30ML
15 SOLUTION ORAL DAILY
Status: DISCONTINUED | OUTPATIENT
Start: 2022-12-27 | End: 2022-12-31

## 2022-12-27 RX ORDER — CHOLECALCIFEROL (VITAMIN D3) 125 MCG
2000 CAPSULE ORAL DAILY
Status: ON HOLD | COMMUNITY
Start: 2022-12-12 | End: 2023-01-10

## 2022-12-27 RX ORDER — OXYCODONE HYDROCHLORIDE 5 MG/1
5 TABLET ORAL
Status: DISCONTINUED | OUTPATIENT
Start: 2022-12-27 | End: 2022-12-30

## 2022-12-27 RX ORDER — GABAPENTIN 100 MG/1
200 CAPSULE ORAL 2 TIMES DAILY
Status: DISCONTINUED | OUTPATIENT
Start: 2022-12-27 | End: 2023-01-10 | Stop reason: HOSPADM

## 2022-12-27 RX ORDER — ZINC SULFATE 50(220)MG
220 CAPSULE ORAL DAILY
Status: DISCONTINUED | OUTPATIENT
Start: 2022-12-27 | End: 2023-01-04

## 2022-12-27 RX ORDER — HYDROMORPHONE HYDROCHLORIDE 1 MG/ML
0.5 INJECTION, SOLUTION INTRAMUSCULAR; INTRAVENOUS; SUBCUTANEOUS
Status: DISCONTINUED | OUTPATIENT
Start: 2022-12-27 | End: 2022-12-30

## 2022-12-27 RX ORDER — LOSARTAN POTASSIUM 50 MG/1
100 TABLET ORAL DAILY
Status: DISCONTINUED | OUTPATIENT
Start: 2022-12-27 | End: 2023-01-05

## 2022-12-27 RX ORDER — CETIRIZINE HYDROCHLORIDE 10 MG/1
10 TABLET ORAL DAILY
Status: ON HOLD | COMMUNITY
End: 2023-01-10

## 2022-12-27 RX ORDER — IBUPROFEN 400 MG/1
200 TABLET ORAL EVERY 6 HOURS PRN
Status: DISCONTINUED | OUTPATIENT
Start: 2022-12-27 | End: 2022-12-27

## 2022-12-27 RX ORDER — GABAPENTIN 400 MG/1
400 CAPSULE ORAL 2 TIMES DAILY
Status: DISCONTINUED | OUTPATIENT
Start: 2022-12-27 | End: 2022-12-27

## 2022-12-27 RX ORDER — PRENATAL VIT/IRON FUM/FOLIC AC 27MG-0.8MG
1 TABLET ORAL DAILY
Status: ON HOLD | COMMUNITY
Start: 2022-11-04 | End: 2023-01-10

## 2022-12-27 RX ORDER — DIAPER,BRIEF,INFANT-TODD,DISP
1 EACH MISCELLANEOUS 2 TIMES DAILY
Status: ON HOLD | COMMUNITY
Start: 2022-12-20 | End: 2023-01-10

## 2022-12-27 RX ORDER — ACETAMINOPHEN 325 MG/1
650 TABLET ORAL EVERY 6 HOURS PRN
Status: DISCONTINUED | OUTPATIENT
Start: 2022-12-27 | End: 2022-12-27

## 2022-12-27 RX ORDER — SODIUM CHLORIDE, SODIUM LACTATE, POTASSIUM CHLORIDE, CALCIUM CHLORIDE 600; 310; 30; 20 MG/100ML; MG/100ML; MG/100ML; MG/100ML
INJECTION, SOLUTION INTRAVENOUS CONTINUOUS
Status: DISCONTINUED | OUTPATIENT
Start: 2022-12-27 | End: 2022-12-27

## 2022-12-27 RX ORDER — ERGOCALCIFEROL 1.25 MG/1
50000 CAPSULE ORAL
Status: DISCONTINUED | OUTPATIENT
Start: 2022-12-27 | End: 2023-01-10 | Stop reason: HOSPADM

## 2022-12-27 RX ORDER — PSYLLIUM HUSK 3 G/5.8 G
1 POWDER (GRAM) ORAL DAILY
Status: ON HOLD | COMMUNITY
Start: 2022-12-20 | End: 2023-01-10

## 2022-12-27 RX ORDER — LANOLIN ALCOHOL/MO/W.PET/CERES
1 CREAM (GRAM) TOPICAL 2 TIMES DAILY PRN
Status: ON HOLD | COMMUNITY
Start: 2022-12-20 | End: 2023-01-10

## 2022-12-27 RX ORDER — FERROUS SULFATE 325(65) MG
325 TABLET ORAL 2 TIMES DAILY
Status: ON HOLD | COMMUNITY
End: 2023-01-10

## 2022-12-27 RX ORDER — MAGNESIUM OXIDE 400 MG/1
400 TABLET ORAL DAILY
Status: ON HOLD | COMMUNITY
End: 2023-01-10

## 2022-12-27 RX ORDER — POTASSIUM CHLORIDE 750 MG/1
20 TABLET, FILM COATED, EXTENDED RELEASE ORAL DAILY
Status: DISCONTINUED | OUTPATIENT
Start: 2022-12-27 | End: 2022-12-27

## 2022-12-27 RX ORDER — SODIUM CHLORIDE 9 MG/ML
2000 INJECTION, SOLUTION INTRAVENOUS ONCE
Status: COMPLETED | OUTPATIENT
Start: 2022-12-27 | End: 2022-12-27

## 2022-12-27 RX ORDER — MAGNESIUM SULFATE HEPTAHYDRATE 40 MG/ML
4 INJECTION, SOLUTION INTRAVENOUS ONCE
Status: COMPLETED | OUTPATIENT
Start: 2022-12-27 | End: 2022-12-27

## 2022-12-27 RX ADMIN — AMPICILLIN AND SULBACTAM 3 G: 1; 2 INJECTION, POWDER, FOR SOLUTION INTRAMUSCULAR; INTRAVENOUS at 13:40

## 2022-12-27 RX ADMIN — DRONEDARONE 400 MG: 400 TABLET, FILM COATED ORAL at 17:23

## 2022-12-27 RX ADMIN — OMEPRAZOLE 20 MG: 20 CAPSULE, DELAYED RELEASE ORAL at 06:48

## 2022-12-27 RX ADMIN — AMPICILLIN AND SULBACTAM 3 G: 1; 2 INJECTION, POWDER, FOR SOLUTION INTRAMUSCULAR; INTRAVENOUS at 06:46

## 2022-12-27 RX ADMIN — Medication 220 MG: at 06:35

## 2022-12-27 RX ADMIN — OXYCODONE HYDROCHLORIDE 10 MG: 10 TABLET ORAL at 13:37

## 2022-12-27 RX ADMIN — ACETAMINOPHEN 650 MG: 325 TABLET ORAL at 09:08

## 2022-12-27 RX ADMIN — INSULIN HUMAN 2 UNITS: 100 INJECTION, SOLUTION PARENTERAL at 21:02

## 2022-12-27 RX ADMIN — SODIUM CHLORIDE 2000 ML: 9 INJECTION, SOLUTION INTRAVENOUS at 20:48

## 2022-12-27 RX ADMIN — AMPICILLIN AND SULBACTAM 3 G: 1; 2 INJECTION, POWDER, FOR SOLUTION INTRAMUSCULAR; INTRAVENOUS at 17:26

## 2022-12-27 RX ADMIN — FUROSEMIDE 20 MG: 40 TABLET ORAL at 06:36

## 2022-12-27 RX ADMIN — LACTULOSE 15 ML: 20 SOLUTION ORAL at 06:35

## 2022-12-27 RX ADMIN — OXYCODONE HYDROCHLORIDE 10 MG: 10 TABLET ORAL at 19:00

## 2022-12-27 RX ADMIN — DOCUSATE SODIUM 50 MG AND SENNOSIDES 8.6 MG 2 TABLET: 8.6; 5 TABLET, FILM COATED ORAL at 06:36

## 2022-12-27 RX ADMIN — ATORVASTATIN CALCIUM 20 MG: 20 TABLET, FILM COATED ORAL at 20:45

## 2022-12-27 RX ADMIN — DRONEDARONE 400 MG: 400 TABLET, FILM COATED ORAL at 09:08

## 2022-12-27 RX ADMIN — IBUPROFEN 200 MG: 400 TABLET, FILM COATED ORAL at 04:31

## 2022-12-27 RX ADMIN — MAGNESIUM SULFATE HEPTAHYDRATE 4 G: 40 INJECTION, SOLUTION INTRAVENOUS at 09:14

## 2022-12-27 RX ADMIN — ACETAMINOPHEN 650 MG: 325 TABLET ORAL at 19:00

## 2022-12-27 RX ADMIN — POLYETHYLENE GLYCOL 3350 1 PACKET: 17 POWDER, FOR SOLUTION ORAL at 03:06

## 2022-12-27 RX ADMIN — FELODIPINE 5 MG: 5 TABLET, EXTENDED RELEASE ORAL at 17:23

## 2022-12-27 RX ADMIN — NICOTINE 14 MG: 14 PATCH TRANSDERMAL at 06:36

## 2022-12-27 RX ADMIN — VANCOMYCIN HYDROCHLORIDE 2000 MG: 500 INJECTION, POWDER, LYOPHILIZED, FOR SOLUTION INTRAVENOUS at 01:44

## 2022-12-27 RX ADMIN — GABAPENTIN 200 MG: 100 CAPSULE ORAL at 17:22

## 2022-12-27 RX ADMIN — LOSARTAN POTASSIUM 100 MG: 50 TABLET, FILM COATED ORAL at 06:35

## 2022-12-27 RX ADMIN — GABAPENTIN 400 MG: 400 CAPSULE ORAL at 06:36

## 2022-12-27 RX ADMIN — POTASSIUM CHLORIDE 20 MEQ: 750 TABLET, FILM COATED, EXTENDED RELEASE ORAL at 06:35

## 2022-12-27 RX ADMIN — OMEPRAZOLE 20 MG: 20 CAPSULE, DELAYED RELEASE ORAL at 17:23

## 2022-12-27 ASSESSMENT — ENCOUNTER SYMPTOMS
COUGH: 0
NAUSEA: 0
ORTHOPNEA: 0
SHORTNESS OF BREATH: 0
BLURRED VISION: 0
PALPITATIONS: 0
MYALGIAS: 0
SPUTUM PRODUCTION: 0
ABDOMINAL PAIN: 0
FEVER: 1
DOUBLE VISION: 0
SORE THROAT: 0
LOSS OF CONSCIOUSNESS: 0
HEMOPTYSIS: 0
HEARTBURN: 0
DIZZINESS: 0
VOMITING: 0
CHILLS: 1
EYE REDNESS: 0

## 2022-12-27 ASSESSMENT — PAIN DESCRIPTION - PAIN TYPE
TYPE: ACUTE PAIN

## 2022-12-27 ASSESSMENT — LIFESTYLE VARIABLES: SUBSTANCE_ABUSE: 0

## 2022-12-27 NOTE — ASSESSMENT & PLAN NOTE
Chest XRAY:  1.  No acute cardiopulmonary abnormality.  2.  Prior granulomatous exposure.  -Patient not currently short of breath  -To be further worked up in the outpatient setting follow up

## 2022-12-27 NOTE — ASSESSMENT & PLAN NOTE
-History of group B streptococcal bacteremia secondary to infection of tibial component of LEFT arthroplasty hardware  -Orthopedics consulted ([facility])  -LEFT Knee was aspirated: Fluid culture with gram stain; cell count; total protein; glucose pending  -Status post Vancomycin and Unasyn; continuing pending speciation and sensitivities  -Patient pending recommendations if will be undergoing procedure

## 2022-12-27 NOTE — ASSESSMENT & PLAN NOTE
-Continue with home Felodipine (Plendil) 5mg daily  -Continue with home Furosemide 20mg daily  -Continue with home Losartan 100mg daily  -Continue with home KCl 20mg daily

## 2022-12-27 NOTE — ASSESSMENT & PLAN NOTE
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

## 2022-12-27 NOTE — ED NOTES
PIV established. Blood collected and sent to lab. Pt medicated by assist RN for pain. Lab at bedside for second set of blood cultures.

## 2022-12-27 NOTE — ASSESSMENT & PLAN NOTE
>>ASSESSMENT AND PLAN FOR CIRRHOSIS (HCC) WRITTEN ON 12/27/2022  1:17 AM BY OPAL HARRIS M.D.    -Continue with home Lactulose 15ml daily

## 2022-12-27 NOTE — ASSESSMENT & PLAN NOTE
Patient has a history of group B strep bacteremia secondary to infection of tibial component of left arthroplasty hardware  Status post antibiotic spacer placement by orthopedics (Dr. Luz)   --Comes in with left knee pain and swelling and L shoulder pain   --Arthrocentesis in the ER concerning for septic knee; Orthopedics consulted: I&D 12/28, cultures growing GBS  Infectious disease consulted: recommending Ancef x 6 weeks, end date 2/7/23, needs weekly labs CBC, CMP  MR Shoulder effusion status post arthrocentesis, fluid analysis didn't show any infection    IV rocephin thru 02/07  PICC placed  SNF placement

## 2022-12-27 NOTE — ASSESSMENT & PLAN NOTE
Continue home dronedarone 400 mg twice daily  Holding eliquis d/t anemia requiring transfusions   -- Discussed adverse effect of holding Eliquis especially in the light of atrial fibrillation including stroke, patient states understanding

## 2022-12-27 NOTE — PROGRESS NOTES
0134 Report received from Rory RN.    0155 Pt arrived to unit from ER. Pt transferred from [facility] to hospital bed with slideboard. Pt AAOx4, VSS, 94% on room air. Pt oriented to room and to call light. Discussed plan of care. Bed locked and in lowest position. Bed alarm on. All needs met at this time.

## 2022-12-27 NOTE — PROGRESS NOTES
Pharmacy Vancomycin Kinetics Note for 12/27/2022     62 y.o. female on Vancomycin day # 1     Vancomycin Indication (AUC Dosing): Sepsis (septic joint)    Provider specified end date: 01/01/23    Active Antibiotics (From admission, onward)      Ordered     Ordering Provider       Tue Dec 27, 2022  2:06 AM    12/27/22 0206  vancomycin (VANCOCIN) 1,750 mg in  mL IVPB  (vancomycin (VANCOCIN) IV (LD + Maintenance))  EVERY 24 HOURS         Marlon Mayer M.D.       Tue Dec 27, 2022  1:15 AM    12/27/22 0115  ampicillin/sulbactam (UNASYN) 3 g in  mL IVPB  EVERY 6 HOURS         Marlon Mayer M.D.    12/27/22 0115  MD Alert...Vancomycin per Pharmacy  PHARMACY TO DOSE        Question:  Indication(s) for vancomycin?  Answer:  Other (comments)  Comment:  Septic Joint    Marlon Mayer M.D.       Mon Dec 26, 2022 10:46 PM    12/26/22 2246  vancomycin (VANCOCIN) 2,000 mg in  mL IVPB  (vancomycin (VANCOCIN) IV (LD + Maintenance))  ONCE         Cade Masters M.D.            Dosing Weight: 77.1 kg (169 lb 15.6 oz)      Admission History: Admitted on 12/26/2022 for Septic joint (HCC) [M00.9]  Pertinent history: Patient with hx of septic arthritis of left knee requiring redo arthroplasty and antibiotic spacer placement in July 2022. Knee pain worsening, red to touch and patient is febrile. hardware is in place. empiric abx initiated.    Allergies:     Nkda [no known drug allergy]     Pertinent cultures to date:     Results       Procedure Component Value Units Date/Time    MRSA By PCR (Amp) [[id number]]     Order Status: Sent Specimen: Respirate from Nares     GRAM STAIN [[id number]] Collected: 12/26/22 2226    Order Status: Completed Specimen: Synovial Updated: 12/27/22 0022     Significant Indicator .     Source SYNO     Site Left Knee     Gram Stain Result Many WBCs.  Rare Gram positive cocci.      FLUID CULTURE W/GRAM STAIN [[id number]] Collected: 12/26/22 2226    Order Status: Sent Specimen:  "Synovial Fluid Updated: 12/27/22 0020     Significant Indicator NEG     Source SYNO     Site Left Knee     Culture Result -     Gram Stain Result Many WBCs.  Rare Gram positive cocci.      Blood Culture [[id number]] Collected: 12/26/22 2225    Order Status: Sent Specimen: Blood from Peripheral Updated: 12/26/22 2231    Narrative:      2 of 2 blood culture x2  Sites order. Per Hospital Policy:  Only change Specimen Src: to \"Line\" if specified by physician  order.    Urinalysis [[id number]]  (Abnormal) Collected: 12/26/22 2149    Order Status: Completed Specimen: Urine Updated: 12/26/22 2227     Color Yellow     Character Cloudy     Specific Gravity 1.016     Ph 6.0     Glucose Negative mg/dL      Ketones Negative mg/dL      Protein 100 mg/dL      Bilirubin Negative     Urobilinogen, Urine 1.0     Nitrite Positive     Leukocyte Esterase Small     Occult Blood Large     Micro Urine Req Microscopic    Narrative:      Indication for culture:->Evaluation for sepsis without a  clear source of infection    Blood Culture [[id number]] Collected: 12/26/22 2202    Order Status: Sent Specimen: Blood from Peripheral Updated: 12/26/22 2224    Narrative:      1 of 2 for Blood Culture x 2 sites order. Per Hospital  Policy: Only change Specimen Src: to \"Line\" if specified by  physician order.    CoV-2, FLU A/B, and RSV by PCR (2-4 Hours pluriSelectID) : Collect NP swab in VTM [[id number]] Collected: 12/26/22 2128    Order Status: Completed Specimen: Respirate Updated: 12/26/22 2222     Influenza virus A RNA Negative     Influenza virus B, PCR Negative     RSV, PCR Negative     SARS-CoV-2 by PCR NotDetected     Comment: [facility] providers: PLEASE REFER TO DE-ESCALATION AND RETESTING PROTOCOL  on [email or web address]    **The [employer] GeneXpert Xpress SARS-CoV-2 RT-PCR Test has been made  available for use under the Emergency Use Authorization (EUA) only.          SARS-CoV-2 Source NP Swab    Urine Culture (NEW) [[id number]] Collected: 12/26/22 2149    " "Order Status: Sent Specimen: Urine Updated: 22    Narrative:      Indication for culture:->Evaluation for sepsis without a  clear source of infection            Labs:     Estimated Creatinine Clearance: 75.9 mL/min (by C-G formula based on SCr of 0.84 mg/dL).  Recent Labs     22   WBC 7.0   NEUTSPOLYS 62.00   BANDSSTABS 22.00*     Recent Labs     22   BUN 25*   CREATININE 0.84   ALBUMIN 3.0*     No intake or output data in the 24 hours ending 22 0207   /61   Pulse 75   Temp (!) 38.3 °C (101 °F) (Oral)   Resp 18   Ht 1.727 m (5' 8\")   Wt 77.1 kg (170 lb)   SpO2 97%  Temp (24hrs), Av.9 °C (100.3 °F), Min:37.2 °C (99 °F), Max:38.3 °C (101 °F)      List concerns for Vancomycin clearance:     BUN/Scr ratio greater than 20:1;Malnutrition/Low albumin;Hypermetabolic State (SIRS)    Pharmacokinetics:     AUC kinetics:   Ke (hr ^-1): 0.0674 hr^-1  Half life: 10.28 hr  Clearance: 3.378  Estimated TDD: 1689  Estimated Dose: 866  Estimated interval: 12.3    A/P:     -  Vancomycin dose: 1750 mg IV q24    -  Next vancomycin level(s):    -TBD     -  Predicted vancomycin AUC from initial AUC test calculator: 518 mg·hr/L    -  Comments: empiric abx for septic joint. Concerns for accumulation given above however, anticipate patient to adequately clear vanco.  Peak and trough to be determined once patient closer to steady state if abx are to continue. MRSA nares ordered for de-escalation purposes. Pharmacy to adjust dosing based on clearance concerns, levels and cultures.       Hamilton Markham, PharmD    "

## 2022-12-27 NOTE — ED NOTES
Pt WC to T-4 from the [facility]. Pt changed into a gown and placed on the monitor. Pt stated she is normally able to ambulate short distances by herself and longer distances with a walker/wheelchair. Agree with triage note. Chart up for ERP.

## 2022-12-27 NOTE — H&P
[facility] History & Physical Note    Date of Service  12/27/2022    Attending: John Wyatt M.d.  Senior Resident: Dr. Mayer  Contact Number: [phone]    Primary Care Physician  Karen Mcclure P.A.-C.    Consultants  orthopedics    Code Status  Full Code    Chief Complaint  Chief Complaint   Patient presents with    Knee Pain     Pt states she woke up this morning with severe pain to L knee. Pt states this is ongoing ailment.       History of Presenting Illness (HPI):   Mrs. Alicia is our 62 year old female patient with a past medical history significant for cocaine and ETOH abuse, cirrhosis, asthma, atrial fibrillation on apixaban, diabetes mellitus, hypertension who presents to the hospital with chief complaint of increased pain in her LEFT knee associated with fevers and chills that began today. Patient describes difficulty ambulating even short distances due to the pain which is atypical for her. Denies any nausea, vomiting, tremors, falls, loss of consciousness, or recent trauma to the knee. Patient is not a reliable historian; tangential when answering to questions. History procurement substantiated by thorough chart review.    Patient with history of bilateral knee surgeries. In 7/22, was found to have group B streptococcal bacteremia secondary to infection of tibial component of LEFT arthroplasty hardware, at which point orthopedics was consulted, arthrocentesis performed which confirmed infection, underwent LEFT knee revision, total arthroplasty with antibiotic spacer placed. Plan was then to continue with CTX daily through 8/30/22. Followed with Dr. Gray (of [facility]).    Patient endorses she has not drank alcohol for weeks, and denies recent cocaine use.    In the ED, patient febrile with T of 100.8F    Blood culture pending  UA - cloudy, 100 protein, nitrite positive, leukocyte esterase small, occult blood large  Urine culture pending    Orthopedics  ([facility]) was consulted    LEFT Knee was aspirated:  Fluid culture with gram stain pending  Fluid cell count pending  Fluid total protein pending  Fluid glucose pending    Status post Vanc and Unasyn    I discussed the plan of care with patient.    Review of Systems  Review of Systems   Constitutional:  Positive for chills, fever and malaise/fatigue.   HENT:  Negative for hearing loss and sore throat.    Eyes:  Negative for blurred vision and double vision.   Respiratory:  Negative for cough, hemoptysis, sputum production and shortness of breath.    Cardiovascular:  Negative for chest pain, palpitations and orthopnea.   Gastrointestinal:  Negative for abdominal pain, heartburn, nausea and vomiting.   Genitourinary:  Negative for dysuria, frequency and urgency.   Musculoskeletal:  Positive for joint pain. Negative for myalgias.   Skin:  Negative for itching and rash.   Neurological:  Negative for loss of consciousness.     Past Medical History   has a past medical history of Arrhythmia, Arthritis, ASTHMA, Blood clotting disorder (HCC) (2018), Dental disorder, Diabetes, Emphysema of lung ([facility]), Heart abnormality, Heart burn, Heart valve disease, Hypertension, Infection (9/4/2017), Infection (2018), Liver disease, Pneumonia (02/2020), and Seizure disorder ([facility]).    Surgical History   has a past surgical history that includes gyn surgery (1982); gyn surgery (2003); colonoscopy with clipping (10/28/2015); colonoscopy with sclerotherapy (10/28/2015); colonoscopy with tattooing (10/28/2015); irrigation & debridement ortho (Right, 9/3/2017); knee arthroplasty total (Right, 2018); pr total knee arthroplasty (Left, 8/24/2020); irrigation & debridement general (Left, 7/17/2022); and pr revise knee joint replace,all parts (Left, 7/19/2022).     Family History  family history is not on file.   Family history reviewed with patient.     Allergies  Allergies   Allergen Reactions    Nkda [No Known Drug Allergy]         Medications  Prior to Admission Medications   Prescriptions Last Dose Informant Patient Reported? Taking?   MULTAQ 400 MG Tab  Patient Yes No   Sig: Take 400 mg by mouth 2 times a day, with meals.   acetaminophen (TYLENOL) 325 MG Tab   No No   Sig: Take 2 Tablets by mouth every 6 hours as needed for Fever or Moderate Pain.   albuterol (VENTOLIN OR PROVENTIL) 108 (90 BASE) MCG/ACT Aero Soln inhalation aerosol  Patient Yes No   Sig: Inhale 2 Puffs every four hours as needed for Shortness of Breath.   ascorbic acid (VITAMIN C) 500 MG tablet   No No   Sig: Take 1 Tablet by mouth every day.   atorvastatin (LIPITOR) 20 MG Tab  Patient No No   Sig: Take 1 Tablet by mouth every evening.   calcium carbonate (TUMS) 500 MG Chew Tab   No No   Sig: Chew 1 Tablet 4 times a day as needed (Heartburn).   docusate sodium 100 MG Cap  Patient Yes No   Sig: Take 100 mg by mouth 2 times a day.   felodipine (PLENDIL) 5 MG TABLET SR 24 HR  Patient Yes No   Sig: Take 5 mg by mouth every evening.   ferrous sulfate 325 (65 Fe) MG tablet   No No   Sig: Take 1 Tablet by mouth every morning with breakfast.   furosemide (LASIX) 20 MG Tab  Patient No No   Sig: Take 1 Tablet by mouth every day.   gabapentin (NEURONTIN) 400 MG Cap  Patient Yes No   Sig: Take 400 mg by mouth 2 times a day.   insulin regular (HUMULIN R) 100 Unit/mL Solution   No No   Sig: Inject 3-15 Units under the skin 4 Times a Day,Before Meals and at Bedtime.   lactulose 20 GM/30ML Solution   No No   Sig: Take 15 mL by mouth every day.   lidocaine (LIDODERM) 5 % Patch   No No   Sig: Place 1 Patch on the skin every 24 hours.   loperamide (IMODIUM) 2 MG Cap   No No   Sig: Take 1 Capsule by mouth 4 times a day as needed for Diarrhea.   losartan (COZAAR) 100 MG Tab  Patient Yes No   Sig: Take 100 mg by mouth every day.   metFORMIN ER (GLUCOPHAGE XR) 500 MG TABLET SR 24 HR  Patient Yes No   Sig: Take 500 mg by mouth every evening.   omeprazole (PRILOSEC) 20 MG delayed-release  capsule   No No   Sig: Take 1 Capsule by mouth every 12 hours.   pantoprazole (PROTONIX) 40 MG Tablet Delayed Response  Patient Yes No   Sig: Take 40 mg by mouth every day.   potassium Chloride ER (K-TAB) 20 MEQ Tab CR tablet  Patient No No   Sig: Take 1 Tablet by mouth every day.   vitamin D2, Ergocalciferol, (DRISDOL) 1.25 MG (50893 UT) Cap capsule   No No   Sig: Take 1 Capsule by mouth every 7 days.      Facility-Administered Medications: None       Physical Exam  Temp:  [37.2 °C (99 °F)-38.3 °C (101 °F)] 38.3 °C (101 °F)  Pulse:  [70-83] 75  Resp:  [18] 18  BP: (117-140)/(58-72) 117/61  SpO2:  [96 %-100 %] 97 %  Blood Pressure: 117/60   Temperature: (!) 38.3 °C (101 °F)   Pulse: 74   Respiration: 18   Pulse Oximetry: 98 %       Physical Exam  Constitutional:       Appearance: She is obese. She is ill-appearing.   HENT:      Head: Normocephalic and atraumatic.      Nose: No congestion or rhinorrhea.      Mouth/Throat:      Pharynx: No oropharyngeal exudate or posterior oropharyngeal erythema.   Cardiovascular:      Rate and Rhythm: Regular rhythm.      Heart sounds: No murmur heard.    No friction rub. No gallop.   Pulmonary:      Effort: Pulmonary effort is normal. No respiratory distress.      Breath sounds: Normal breath sounds. No stridor. No wheezing, rhonchi or rales.   Abdominal:      General: Abdomen is flat. There is no distension.      Palpations: Abdomen is soft.      Tenderness: There is no abdominal tenderness. There is no guarding.   Musculoskeletal:         General: Swelling and tenderness present.      Cervical back: No rigidity.      Comments: LEFT Knee incision clean/dry/intact; joint erythematous, swollen, hot, tender to palpation with limited ROM; RIGHT Knee incision clean/dry/intact, erythematous warm skin up to level of knee suspicious for cellulitis   Neurological:      Mental Status: She is disoriented.       Laboratory:  Recent Labs     12/26/22  2202   WBC 7.0   RBC 3.41*   HEMOGLOBIN  9.8*   HEMATOCRIT 30.7*   MCV 90.0   MCH 28.7   MCHC 31.9*   RDW 57.9*   PLATELETCT 58*   MPV 10.1     Recent Labs     12/26/22 2202   SODIUM 130*   POTASSIUM 4.3   CHLORIDE 103   CO2 19*   GLUCOSE 128*   BUN 25*   CREATININE 0.84   CALCIUM 8.4*     Recent Labs     12/26/22 2202   ALTSGPT 22   ASTSGOT 26   ALKPHOSPHAT 126*   TBILIRUBIN 0.9   GLUCOSE 128*         No results for input(s): NTPROBNP in the last 72 hours.      No results for input(s): TROPONINT in the last 72 hours.    Imaging:  DX-CHEST-PORTABLE (1 VIEW)   Final Result      1.  No acute cardiopulmonary abnormality.   2.  Prior granulomatous exposure.         DX-KNEE 3 VIEWS LEFT   Final Result      1.  Revised left knee arthroplasty is unchanged since prior. No evidence of hardware complication.   2.  No acute fracture or dislocation.   3.  Marked soft tissue swelling and suspected knee effusion.          no X-Ray or EKG requiring interpretation    Assessment/Plan:  Mrs. Alicia is our 62 year old female patient with a past medical history significant for cocaine and ETOH abuse, cirrhosis, asthma, atrial fibrillation on apixaban, diabetes mellitus, hypertension who presents to the hospital with chief complaint of increased pain in her LEFT knee associated with fevers and chills.    I anticipate this patient will require at least two midnights for appropriate medical management, necessitating inpatient admission because patient have an infection that will likely require IV antibiotics with further workup    Patient will need a Med/Surg bed on MEDICAL service .  The need is secondary to septic arthritis.    * Septic joint (HCC)- (present on admission)  Assessment & Plan  -History of group B streptococcal bacteremia secondary to infection of tibial component of LEFT arthroplasty hardware  -Orthopedics consulted ([facility])  -LEFT Knee was aspirated: Fluid culture with gram stain; cell count; total protein; glucose pending  -Status post Vancomycin  and Unasyn; continuing pending speciation and sensitivities  -Patient pending recommendations if will be undergoing procedure    Hepatic encephalopathy  Assessment & Plan  -Patient on Lactulose, Cirrhosis secondary to chronic alcoholism  -Has not had bowel movement in three days per patient  -Baseline 2 or more bowel movements per day  -Aterixis on physical exam    Abnormal finding of lung  Assessment & Plan  Chest XRAY:  1.  No acute cardiopulmonary abnormality.  2.  Prior granulomatous exposure.  -Patient not currently short of breath  -To be further worked up in the outpatient setting follow up    Altered mental state  Assessment & Plan  -May be related to acute infection; blood cultures pending  -History of cocaine use; urine drug screen pending  -Possible Hepatic encephalopathy; ammonia 46  -Follow up Zinc level, and consider supplementing if low      GERD (gastroesophageal reflux disease)  Assessment & Plan  -Continue with home Omeprazole 20mg    Type 2 diabetes mellitus (HCC)  Assessment & Plan  -Sliding Scale Insulin  -Hypoglycemia protocol  -Home regimen humulin, Metformin 500mg daily    Anemia of chronic disease  Assessment & Plan  -Anemia of Chronic Disease  -Hgb 9.8    Asymptomatic bacteriuria  Assessment & Plan  -Patient denies dysuria, frequency, suprapubic tenderness  -UA - cloudy, 100 protein, nitrite positive, leukocyte esterase small, occult blood large  -Urine culture pending    Hyperlipidemia- (present on admission)  Assessment & Plan  -Continue with home Lipitor 20mg    Atrial fibrillation (HCC)- (present on admission)  Assessment & Plan  -Continue with home Multaq 400 mg BID    Cirrhosis (HCC)- (present on admission)  Assessment & Plan  -Continue with home Lactulose 15ml daily    Hypertension- (present on admission)  Assessment & Plan  -Continue with home Felodipine (Plendil) 5mg daily  -Continue with home Furosemide 20mg daily  -Continue with home Losartan 100mg daily  -Continue with home KCl  20mg daily    Thrombocytopenia (HCC)- (present on admission)  Assessment & Plan  -Holding eliquis as patient may need surgery with Orthopedics  -Platelets 58; no need to transfuse at this time, consider if going for procedure        VTE prophylaxis: enoxaparin ppx

## 2022-12-27 NOTE — ED TRIAGE NOTES
Chief Complaint   Patient presents with    Knee Pain     Pt states she woke up this morning with severe pain to L knee. Pt states this is ongoing ailment.     Pt brought to triage via w/c. Pt appears lethargic and 2mm pupils. Pt complains about L knee pain with mild swelling. Pt states she goes to physical therapy once a week but has had trouble getting around her house today    Pt is alert/oriented and follows commands. Pt speaking in full sentences and responds appropriately to questions. No acute distress noted in triage and respirations are even and unlabored.     Pt placed in lobby and educated on triage process. Pt encouraged to alert staff for any changes in condition.

## 2022-12-27 NOTE — RESPIRATORY CARE
" COPD EDUCATION by COPD CLINICAL EDUCATOR  12/27/2022 at 7:17 AM by Daniela Morris, RRT     Patient reviewed by COPD education team. Patient does not have a history or diagnosis of COPD and is a non-smoker.  Therefore, patient does not qualify for the COPD program. PFT 2/20/2020 \"Normal\" in EMR  "

## 2022-12-27 NOTE — OP REPORT
CC: infected antibiotic spacer    HPI:   61yo F with chronic infected L TKA. She underwent resection and antibioci spacer in 7/2022 after failed one stage a year prior in [place], [place]. She had ipsilateral native infected shoulder at the time as well that was washed out in the OR. Since July, unfortunately, she has not been able to make many of her follow up appointments. However, she was treated with 6 weeks IV antibiotics followed by an antibiotic holiday. Given concern for her chronic infection, we aspirated her knee in clinic about 6 weeks after stopping antibiotics and inflammatory labs and TNC were both down. However, she was unable to make her next follow up. She presented to the ER yesterday with painful swollen L TKA.     She has history of cocaine, ETOH abuse as well as cirrhosis, afib on apixaban, DM, HTN who has 1 day of increased L knee pain. She notes that she was doing well up until yesterday morning when she developed acute pain. She has not been on abx for about 2 months.    ROS: Positive for musculoskeletal pain and what is stated in the HPI and PMH, otherwise negative review of systems    PMH:   Past Medical History:   Diagnosis Date    Arrhythmia     Arthritis     OA in knees    ASTHMA     Blood clotting disorder (HCC) 2018    right leg blood clot    Dental disorder     upper and lower dentures    Diabetes     Emphysema of lung (HCC)     Heart abnormality     leakage in left valve    Heart burn     left knee    Heart valve disease     Hypertension     Infection 9/4/2017    Infection 2018    Right knee after total knee    Liver disease     Pneumonia 02/2020    Seizure disorder (HCC)        PSH:   Past Surgical History:   Procedure Laterality Date    PB REVISE KNEE JOINT REPLACE,ALL PARTS Left 7/19/2022    Procedure: REVISION, TOTAL ARTHROPLASTY, KNEE, ALL COMPONENTS - ANTIBIOTIC SPACER;  Surgeon: Widl Luz M.D.;  Location: SURGERY [facility];  Service: Orthopedics    IRRIGATION &  DEBRIDEMENT GENERAL Left 7/17/2022    Procedure: IRRIGATION AND DEBRIDEMENT, SHOULDER;  Surgeon: Obdulio Gray M.D.;  Location: SURGERY [facility];  Service: Orthopedics    PB TOTAL KNEE ARTHROPLASTY Left 8/24/2020    Procedure: ARTHROPLASTY, KNEE, TOTAL;  Surgeon: Víctor Faustin M.D.;  Location: SURGERY [facility];  Service: Orthopedics    KNEE ARTHROPLASTY TOTAL Right 2018    IRRIGATION & DEBRIDEMENT ORTHO Right 9/3/2017    Procedure: IRRIGATION & DEBRIDEMENT ORTHO, POLY EXCHANGE;  Surgeon: Jerome Russell M.D.;  Location: SURGERY [facility];  Service: Orthopedics    COLONOSCOPY WITH CLIPPING  10/28/2015    Procedure: COLONOSCOPY WITH CLIPPING;  Surgeon: Ruben Colon M.D.;  Location: ENDOSCOPY [facility];  Service:     COLONOSCOPY WITH SCLEROTHERAPY  10/28/2015    Procedure: COLONOSCOPY WITH SCLEROTHERAPY;  Surgeon: Ruben Colon M.D.;  Location: ENDOSCOPY [facility];  Service:     COLONOSCOPY WITH TATTOOING  10/28/2015    Procedure: COLONOSCOPY WITH TATTOOING;  Surgeon: Ruben Colon M.D.;  Location: ENDOSCOPY [facility];  Service:     GYN SURGERY  2003    hysteroscoopy    GYN SURGERY  1982    tubal ligation       Meds:   Medications Prior to Admission   Medication Sig Dispense Refill Last Dose    magnesium oxide (MAG-OX) 400 MG Tab tablet Take 400 mg by mouth every day.   UNK at UNK    cetirizine (ZYRTEC) 10 MG Tab Take 10 mg by mouth every day.   UNK at UNK    ferrous sulfate 325 (65 Fe) MG tablet Take 325 mg by mouth 2 times a day.   UNK at UNK    Calcium Carb-Cholecalciferol (OYSTER SHELL CALCIUM W/D) 500-5 MG-MCG Tab Take 1 Tablet by mouth every day.   UNK at UNK    Cholecalciferol (VITAMIN D3) 50 MCG (2000 UT) Tab Take 2,000 Units by mouth every day.   UNK at UNK    hydrocortisone 1 % Ointment Apply 1 Application topically 2 times a day.   UNK at UNK    Prenatal Vit-Fe Fumarate-FA (PRENATAL VITAMIN WITH FE/FA) 27-0.8 MG Tab Take 1 Tablet by mouth every day.    UNK at UNK    REGULOID 57.6 % Powder Take 1 Dose by mouth every day.   UNK at UNK    Skin Protectants, Misc. (MINERIN CREME) Cream Apply 1 Dose topically 2 times a day as needed.   UNK at UNK    SPIRIVA HANDIHALER 18 MCG Cap Place 1 Capsule into inhaler and inhale every day.   UNK at UNK    ascorbic acid (VITAMIN C) 500 MG tablet Take 1 Tablet by mouth every day. 30 Tablet  UNK at UNK    lidocaine (LIDODERM) 5 % Patch Place 1 Patch on the skin every 24 hours. 10 Patch  UNK at UNK    pantoprazole (PROTONIX) 20 MG tablet Take 20 mg by mouth every day.   UNK at UNK    felodipine (PLENDIL) 5 MG TABLET SR 24 HR Take 5 mg by mouth every evening.   UNK at UNK    gabapentin (NEURONTIN) 400 MG Cap Take 400 mg by mouth 2 times a day.   UNK at UNK    losartan (COZAAR) 100 MG Tab Take 100 mg by mouth every day.   UNK at UNK    albuterol (VENTOLIN OR PROVENTIL) 108 (90 BASE) MCG/ACT Aero Soln inhalation aerosol Inhale 2 Puffs every four hours as needed for Shortness of Breath.   UNK at UNK       Allergies:   Allergies   Allergen Reactions    Nkda [No Known Drug Allergy]        SH:   Social History     Socioeconomic History    Marital status: Single     Spouse name: Not on file    Number of children: Not on file    Years of education: Not on file    Highest education level: Not on file   Occupational History    Not on file   Tobacco Use    Smoking status: Former     Types: Cigarettes     Quit date: 2016     Years since quittin.9    Smokeless tobacco: Former     Quit date: 2021    Tobacco comments:     a few a day when I can   Vaping Use    Vaping Use: Never used   Substance and Sexual Activity    Alcohol use: No    Drug use: No    Sexual activity: Not on file   Other Topics Concern    Not on file   Social History Narrative    Not on file     Social Determinants of Health     Financial Resource Strain: Not on file   Food Insecurity: Not on file   Transportation Needs: Not on file   Physical Activity: Not on file    Stress: Not on file   Social Connections: Not on file   Intimate Partner Violence: Not on file   Housing Stability: Not on file       FH:   History reviewed. No pertinent family history.    Physical Exam:   General: AO x1  Eyes: non-icteric  Breathing: nonlabored  MSK:   L knee demonstrates healed incision. Pain with any ROM. 3+ effusion. Fires EHL, FHL, TA and GSC. SILT in L2-S1 distribution    Imaging:   Xrays demonstrate spacer in place    Assessment/Plan:   63 yo F with chronically infected TKA now with recurrent infected antibiotic spacer. I discussed the diagnosis with the patient. We repeated the conversations we have previously had about the gravity of this diagnosis. She has had this spacer for about 5 months and we have not successfully cleared infected. This appears to be the same Group B strep from prior infection. She has a complex history with Group B strep  infection >3 years ago as well in [place]. I discussed with her the risks and benefits of intervention. Certainly it will be very difficult to erradicate infection with IV antibiotics alone. Operative intervention includes exchange antibiotic spacer with irrigation and debridement and another 6 weeks course of antibiotics. However, I gave her a very guarded prognosis of erradication of infectiona dn also discussed with her that above knee amputation is a real possibility given the difficulty with treating her infection. I discussed the importance of following through with follow up as well as completing her antibiotic course. Additionally discussed that reimplantation is not an option at this point given the active infection. We discussed risks of surgery including but not limited to fracture, recurrent infection, pain, bleeding, nerve damage, stroke, heart attack and death. The patient wisehd to proceed with surgery. Will plan for tomorrow. NPO midnight for OR.      Wild Luz M.D.

## 2022-12-27 NOTE — ASSESSMENT & PLAN NOTE
-Patient denies dysuria, frequency, suprapubic tenderness  -UA - cloudy, 100 protein, nitrite positive, leukocyte esterase small, occult blood large  -Urine culture pending

## 2022-12-27 NOTE — PROGRESS NOTES
Per Microbiology, pt has group B Strep from Synovial fluids collection on L knee. Hospitalist informed via Voalte.

## 2022-12-27 NOTE — HOSPITAL COURSE
This is a 62-year-old female with a past medical history significant for cocaine abuse, ethanol abuse complicated by liver cirrhosis, asthma, atrial fibrillation, second hypercoagulable state on Eliquis, diabetes mellitus, hypertension presented to ER on 12/ planes of increased pain in her left knee associated with fever, chills.    She had hx of bilateral knee surgery In 7/22, was found to have group B streptococcal bacteremia secondary to infection of tibial component of LEFT arthroplasty hardware; she underwent LEFT knee revision, total arthroplasty with antibiotic spacer placed. Plan was then to continue with CTX daily through 8/30/22. Followed with Dr. Gray (of [facility]).    In ER, underwent arthrocentesis, WBC 21,150; culture growing group B strep orthopedic and ID were consulted. Patient was started on broad spectrum antibiotics. Ortho performed left total knee arthroplasty explantation, left knee insertion of articulating spacer, left knee application of incisional wound vacuum. PICC was placed. Patient is to be on IV antibiotics until 02/07. Wound vac in place. Case management assisted with skilled nursing facility placement. Patient was determined satisfactory for discharge with appropriate follow up.   I will START or STAY ON the medications listed below when I get home from the hospital:    Aspirin Enteric Coated 81 mg oral delayed release tablet  -- 1 tab(s) by mouth once a day  -- Indication: For Cardioprotective    irbesartan 150 mg oral tablet  -- 1 tab(s) by mouth once a day  -- Indication: For Hypertension    atorvastatin 40 mg oral tablet  -- 1 tab(s) by mouth once a day (at bedtime)  -- Indication: For Hyperlipemia

## 2022-12-27 NOTE — ED PROVIDER NOTES
ED Provider Note    CHIEF COMPLAINT  Chief Complaint   Patient presents with    Knee Pain     Pt states she woke up this morning with severe pain to L knee. Pt states this is ongoing ailment.       HPI  April Alicia is a 62 y.o. female who presents with chief complaint of worsening knee pain.  Patient reports she has had a longstanding history of knee pain but it was significantly worse this morning.  She reports that she is unable to walk secondary to the pain.  Patient has a history of bilateral knee replacement.   Patient also reports she has felt very hot, like she has had a fever all day.  She denies any associated cough, sore throat, abdominal pain, neck pain or neck stiffness.  She reports she has been sober for weeks now and has not used cocaine recently.  On EHR patient has a longstanding history of chronic alcohol abuse and alcoholic liver cirrhosis.  Patient denies any associated abdominal pain.  Patient reports her knee started hurting this morning, she denies any recent trauma.  Patient denies any neck or back pain.  EHR review reveals patient has had a prior history of Streptococcus bacteremia, and septic arthritis of her left knee requiring revision and washout.  This most recently occurred in July of this year.  Patient follows with Dr. Luz of orthopedics.    REVIEW OF SYSTEMS  ROS        PAST MEDICAL HISTORY   has a past medical history of Arrhythmia, Arthritis, ASTHMA, Blood clotting disorder (HCC) (2018), Dental disorder, Diabetes, Emphysema of lung (HCC), Heart abnormality, Heart burn, Heart valve disease, Hypertension, Infection (2017), Infection (2018), Liver disease, Pneumonia (2020), and Seizure disorder (HCC).    SOCIAL HISTORY  Social History     Tobacco Use    Smoking status: Former     Types: Cigarettes     Quit date: 2016     Years since quittin.9    Smokeless tobacco: Former     Quit date: 2021    Tobacco comments:     a few a day when I can   Vaping  Use    Vaping Use: Never used   Substance and Sexual Activity    Alcohol use: No    Drug use: No    Sexual activity: Not on file       SURGICAL HISTORY   has a past surgical history that includes gyn surgery (1982); gyn surgery (2003); colonoscopy with clipping (10/28/2015); colonoscopy with sclerotherapy (10/28/2015); colonoscopy with tattooing (10/28/2015); irrigation & debridement ortho (Right, 9/3/2017); knee arthroplasty total (Right, 2018); total knee arthroplasty (Left, 8/24/2020); irrigation & debridement general (Left, 7/17/2022); and revise knee joint replace,all parts (Left, 7/19/2022).    CURRENT MEDICATIONS  Home Medications       Reviewed by Felipe Rosenberg R.N. (Registered Nurse) on 12/26/22 at 1946  Med List Status: <None>     Medication Last Dose Status   acetaminophen (TYLENOL) 325 MG Tab  Active   albuterol (VENTOLIN OR PROVENTIL) 108 (90 BASE) MCG/ACT Aero Soln inhalation aerosol  Active   ascorbic acid (VITAMIN C) 500 MG tablet  Active   atorvastatin (LIPITOR) 20 MG Tab  Active   calcium carbonate (TUMS) 500 MG Chew Tab  Active   docusate sodium 100 MG Cap  Active   felodipine (PLENDIL) 5 MG TABLET SR 24 HR  Active   ferrous sulfate 325 (65 Fe) MG tablet  Active   furosemide (LASIX) 20 MG Tab  Active   gabapentin (NEURONTIN) 400 MG Cap  Active   insulin regular (HUMULIN R) 100 Unit/mL Solution  Active   lactulose 20 GM/30ML Solution  Active   lidocaine (LIDODERM) 5 % Patch  Active   loperamide (IMODIUM) 2 MG Cap  Active   losartan (COZAAR) 100 MG Tab  Active   metFORMIN ER (GLUCOPHAGE XR) 500 MG TABLET SR 24 HR  Active   MULTAQ 400 MG Tab  Active   omeprazole (PRILOSEC) 20 MG delayed-release capsule  Active   pantoprazole (PROTONIX) 40 MG Tablet Delayed Response  Active   potassium Chloride ER (K-TAB) 20 MEQ Tab CR tablet  Active   vitamin D2, Ergocalciferol, (DRISDOL) 1.25 MG (59691 UT) Cap capsule  Active                    ALLERGIES  Allergies   Allergen Reactions    Nkda [No Known Drug  Allergy]        PHYSICAL EXAM  Vitals:    12/26/22 2108   BP: (!) 140/72   Pulse: 76   Resp: 18   Temp: (!) 38.2 °C (100.8 °F)   SpO2: 96%       Physical Exam  Constitutional:       Appearance: She is well-developed.   HENT:      Head: Normocephalic and atraumatic.   Eyes:      Conjunctiva/sclera: Conjunctivae normal.   Cardiovascular:      Rate and Rhythm: Normal rate and regular rhythm.   Pulmonary:      Effort: Pulmonary effort is normal.      Breath sounds: Normal breath sounds.   Abdominal:      General: Bowel sounds are normal. There is no distension.      Palpations: Abdomen is soft.      Tenderness: There is no abdominal tenderness. There is no rebound.   Musculoskeletal:      Cervical back: Normal range of motion and neck supple.      Comments: Left knee with associated effusion.  Considerable pain on range of motion of left knee.  No associated acute bony abnormality.  Multiple prior surgical scars.  There is no overlying erythema or induration   Skin:     General: Skin is warm and dry.      Findings: No rash.   Neurological:      Mental Status: She is alert and oriented to person, place, and time.   Psychiatric:         Behavior: Behavior normal.         DIAGNOSTIC STUDIES / PROCEDURES      LABS  Results for orders placed or performed during the hospital encounter of 12/26/22   CoV-2, FLU A/B, and RSV by PCR (2-4 Hours CEPHEID) : Collect NP swab in VTM    Specimen: Respirate   Result Value Ref Range    Influenza virus A RNA Negative Negative    Influenza virus B, PCR Negative Negative    RSV, PCR Negative Negative    SARS-CoV-2 by PCR NotDetected     SARS-CoV-2 Source NP Swab    CBC With Differential   Result Value Ref Range    WBC 7.0 4.8 - 10.8 K/uL    RBC 3.41 (L) 4.20 - 5.40 M/uL    Hemoglobin 9.8 (L) 12.0 - 16.0 g/dL    Hematocrit 30.7 (L) 37.0 - 47.0 %    MCV 90.0 81.4 - 97.8 fL    MCH 28.7 27.0 - 33.0 pg    MCHC 31.9 (L) 33.6 - 35.0 g/dL    RDW 57.9 (H) 35.9 - 50.0 fL    Platelet Count 58 (L) 164 -  446 K/uL    MPV 10.1 9.0 - 12.9 fL    Neutrophils-Polys 62.00 44.00 - 72.00 %    Lymphocytes 10.00 (L) 22.00 - 41.00 %    Monocytes 5.00 0.00 - 13.40 %    Eosinophils 0.00 0.00 - 6.90 %    Basophils 1.00 0.00 - 1.80 %    Nucleated RBC 0.00 /100 WBC    Neutrophils (Absolute) 5.88 2.00 - 7.15 K/uL    Lymphs (Absolute) 0.70 (L) 1.00 - 4.80 K/uL    Monos (Absolute) 0.35 0.00 - 0.85 K/uL    Eos (Absolute) 0.00 0.00 - 0.51 K/uL    Baso (Absolute) 0.07 0.00 - 0.12 K/uL    NRBC (Absolute) 0.00 K/uL    Hypochromia 1+     Anisocytosis 1+     Microcytosis 1+    Comp Metabolic Panel   Result Value Ref Range    Sodium 130 (L) 135 - 145 mmol/L    Potassium 4.3 3.6 - 5.5 mmol/L    Chloride 103 96 - 112 mmol/L    Co2 19 (L) 20 - 33 mmol/L    Anion Gap 8.0 7.0 - 16.0    Glucose 128 (H) 65 - 99 mg/dL    Bun 25 (H) 8 - 22 mg/dL    Creatinine 0.84 0.50 - 1.40 mg/dL    Calcium 8.4 (L) 8.5 - 10.5 mg/dL    AST(SGOT) 26 12 - 45 U/L    ALT(SGPT) 22 2 - 50 U/L    Alkaline Phosphatase 126 (H) 30 - 99 U/L    Total Bilirubin 0.9 0.1 - 1.5 mg/dL    Albumin 3.0 (L) 3.2 - 4.9 g/dL    Total Protein 6.4 6.0 - 8.2 g/dL    Globulin 3.4 1.9 - 3.5 g/dL    A-G Ratio 0.9 g/dL   Lactic Acid   Result Value Ref Range    Lactic Acid 1.6 0.5 - 2.0 mmol/L   Urinalysis    Specimen: Urine   Result Value Ref Range    Color Yellow     Character Cloudy (A)     Specific Gravity 1.016 <1.035    Ph 6.0 5.0 - 8.0    Glucose Negative Negative mg/dL    Ketones Negative Negative mg/dL    Protein 100 (A) Negative mg/dL    Bilirubin Negative Negative    Urobilinogen, Urine 1.0 Negative    Nitrite Positive (A) Negative    Leukocyte Esterase Small (A) Negative    Occult Blood Large (A) Negative    Micro Urine Req Microscopic    C Reactive Protein Quantitative (Non-Cardiac)   Result Value Ref Range    Stat C-Reactive Protein 10.50 (H) 0.00 - 0.75 mg/dL   AMMONIA   Result Value Ref Range    Ammonia 43 11 - 45 umol/L   Fluid Cell Count   Result Value Ref Range    Fluid Type  Synovial     Color-Body Fluid Red     Character-Body Fluid Bloody     Total RBC Count 2,880,000 cells/uL    Total WBC 64092 cells/uL   DIAGNOSTIC ALCOHOL   Result Value Ref Range    Diagnostic Alcohol <10.1 <10.1 mg/dL   URINE MICROSCOPIC (W/UA)   Result Value Ref Range    WBC 5-10 (A) /hpf    RBC >150 (A) /hpf    Bacteria Many (A) None /hpf    Epithelial Cells Few /hpf    Hyaline Cast 0-2 /lpf   ESTIMATED GFR   Result Value Ref Range    GFR (CKD-EPI) 78 >60 mL/min/1.73 m 2   CORRECTED CALCIUM   Result Value Ref Range    Correct Calcium 9.2 8.5 - 10.5 mg/dL   DIFFERENTIAL MANUAL   Result Value Ref Range    Bands-Stabs 22.00 (H) 0.00 - 10.00 %    Manual Diff Status PERFORMED    PERIPHERAL SMEAR REVIEW   Result Value Ref Range    Peripheral Smear Review see below    PLATELET ESTIMATE   Result Value Ref Range    Plt Estimation Marked Decrease    MORPHOLOGY   Result Value Ref Range    RBC Morphology Present     Polychromia 1+     Poikilocytosis 1+     Ovalocytes 1+     Toxic Gran Moderate     Dohle Bodies Few          RADIOLOGY  DX-CHEST-PORTABLE (1 VIEW)   Final Result      1.  No acute cardiopulmonary abnormality.   2.  Prior granulomatous exposure.         DX-KNEE 3 VIEWS LEFT   Final Result      1.  Revised left knee arthroplasty is unchanged since prior. No evidence of hardware complication.   2.  No acute fracture or dislocation.   3.  Marked soft tissue swelling and suspected knee effusion.          Arthrocentesis    Date/Time: 12/27/2022 12:19 AM  Performed by: Cade Masters M.D.  Authorized by: Cade Masters M.D.   Consent: Verbal consent obtained.  Risks and benefits: risks, benefits and alternatives were discussed  Consent given by: patient  Indications: possible septic joint   Body area: knee  Joint: left knee  Local anesthesia used: yes    Anesthesia:  Local anesthesia used: yes  Local Anesthetic: lidocaine 1% with epinephrine  Preparation: Patient was prepped and draped in the usual sterile fashion  (Betadine was used to prep the skin, this was allowed to dry, sterile technique was employed throughout the procedure).  Needle size: 18 G  Approach: lateral  Aspirate: bloody and cloudy  Aspirate amount: 30 mL  Patient tolerance: patient tolerated the procedure well with no immediate complications          40 minutes of critical care spent with this patient, this does not include the time spent on the procedure above    COURSE & MEDICAL DECISION MAKING  Pertinent Labs & Imaging studies reviewed. (See chart for details)    Patient here with possible recurrent sepsis secondary to septic joint.  Will check sepsis orders, and discussed case with orthopedics to see if they are comfortable with me tapping the prosthetic joint.  We will also check chest x-ray and alternative causes of possible sepsis including urinalysis though my suspicion in this febrile patient with acute on chronic knee patient is that she does have septic arthritis.  Patient case discussed with orthopedics who is comfortable with me tapping the joint; they also alerted me that patient does have a history of septic arthritis of her shoulder as well.  Patient has forage motion of her right shoulder, she is unable to bring her left arm all the way above her head but has no associated tenderness here and I am able to passively range the patient through an entire range of motion without any pain evoked.  I have a low suspicion of a concurrent septic arthritis here.  I have tapped the knee as above, sterilely.  Patient was then given empiric antibiotics, Unasyn and vancomycin.  Patient will be admitted for ongoing work-up.  Patient given IV fluids.  I discussed case with [facility] who has agreed to admit.  Viral testing is negative        FINAL IMPRESSION  1.  Sepsis, fever of unclear origin, acute knee pain    Electronically signed by: Cade Masters M.D., 12/26/2022 9:17 PM

## 2022-12-27 NOTE — PROGRESS NOTES
4 Eyes Skin Assessment Completed by DANYEL Leblanc and DANYEL Amezcua.    Head WDL  Ears WDL  Nose WDL  Mouth WDL  Neck WDL  Breast/Chest WDL  Shoulder Blades WDL  Spine WDL  (R) Arm/Elbow/Hand WDL  (L) Arm/Elbow/Hand WDL  Abdomen WDL  Groin WDL  Scrotum/Coccyx/Buttocks WDL  (R) Leg Redness, Blanching, Scar, and Swelling to knee, discoloration to lower leg from healed skin graft site  (L) Leg Scar and Swelling, discoloration to lower leg from healed skin graft site  (R) Heel/Foot/Toe Swelling  (L) Heel/Foot/Toe Swelling          Devices In Places Blood Pressure Cuff, Pulse Ox, and SCD's      Interventions In Place Pillows    Possible Skin Injury No    Pictures Uploaded Into Epic N/A  Wound Consult Placed N/A  RN Wound Prevention Protocol Ordered Yes

## 2022-12-27 NOTE — ED NOTES
Report called to T3 RN  Transport at   Pt transported upstairs in stable condition, A&O x4 w/ all belongings

## 2022-12-27 NOTE — ASSESSMENT & PLAN NOTE
Home regimen: Losartan 100 mg daily, felodipine 5 mg q. Evening  Hold as patient Bp is noted to eb on the lower side

## 2022-12-27 NOTE — ASSESSMENT & PLAN NOTE
-May be related to acute infection; blood cultures pending  -History of cocaine use; urine drug screen pending  -Possible Hepatic encephalopathy; ammonia 46  -Follow up Zinc level, and consider supplementing if low

## 2022-12-27 NOTE — ED NOTES
Staff rounded on pt. Pt sleeping on gurney in no apparent distress. UDS collected and sent to lab.

## 2022-12-27 NOTE — PROGRESS NOTES
Hospital Medicine Daily Progress Note    Date of Service  12/27/2022    Chief Complaint  April Alicia is a 62 y.o. female admitted 12/26/2022 with left knee pain    Hospital Course  Mrs. Alicia is our 62 year old female patient with a past medical history significant for cocaine and ETOH abuse, cirrhosis, asthma, atrial fibrillation on apixaban, diabetes mellitus, hypertension who presents to the hospital with chief complaint of increased pain in her LEFT knee associated with fevers and chills that began today. Patient describes difficulty ambulating even short distances due to the pain which is atypical for her. Denies any nausea, vomiting, tremors, falls, loss of consciousness, or recent trauma to the knee. Patient is not a reliable historian; tangential when answering to questions. History procurement substantiated by thorough chart review.     Patient with history of bilateral knee surgeries. In 7/22, was found to have group B streptococcal bacteremia secondary to infection of tibial component of LEFT arthroplasty hardware, at which point orthopedics was consulted, arthrocentesis performed which confirmed infection, underwent LEFT knee revision, total arthroplasty with antibiotic spacer placed. Plan was then to continue with CTX daily through 8/30/22. Followed with Dr. Gray (of [facility]).     Patient endorses she has not drank alcohol for weeks, and denies recent cocaine use.    In the ER the patient underwent arthrocentesis of the left knee which showed 21,150 WBCs, 86% PMNs.  Culture grew group B strep.  Orthopedics was consulted.    Interval Problem Update  -Patient febrile yesterday to 38.3  -Synovial fluid growing group B strep  -Orthopedics consulted, taking for washout tomorrow 12/28  -Patient having severe left knee pain and left shoulder pain    I have discussed this patient's plan of care and discharge plan at IDT rounds today with Case Management, Nursing, Nursing  leadership, and other members of the IDT team.    Consultants/Specialty  orthopedics    Code Status  Full Code    Disposition  Patient is not medically cleared for discharge.   Anticipate discharge to  TBD .  I have placed the appropriate orders for post-discharge needs.    Review of Systems  Review of Systems   Constitutional:  Positive for fever and malaise/fatigue.   HENT:  Positive for hearing loss.    Eyes:  Negative for redness.   Respiratory:  Negative for shortness of breath.    Cardiovascular:  Positive for leg swelling. Negative for chest pain.   Gastrointestinal:  Negative for nausea and vomiting.   Musculoskeletal:  Positive for joint pain.   Skin: Negative.    Neurological:  Negative for dizziness.   Psychiatric/Behavioral:  Negative for substance abuse.       Physical Exam  Temp:  [36.7 °C (98.1 °F)-38.3 °C (101 °F)] 36.8 °C (98.2 °F)  Pulse:  [70-83] 74  Resp:  [15-18] 15  BP: (117-142)/(58-72) 117/66  SpO2:  [93 %-100 %] 93 %    Physical Exam  Constitutional:       General: She is not in acute distress.     Appearance: She is not toxic-appearing or diaphoretic.   HENT:      Head: Normocephalic and atraumatic.      Nose: Nose normal.      Mouth/Throat:      Mouth: Mucous membranes are moist.   Eyes:      General: No scleral icterus.     Conjunctiva/sclera: Conjunctivae normal.   Cardiovascular:      Rate and Rhythm: Normal rate and regular rhythm.      Heart sounds: Murmur heard.     No friction rub. No gallop.   Pulmonary:      Effort: Pulmonary effort is normal.      Breath sounds: Normal breath sounds.   Abdominal:      General: Bowel sounds are normal. There is no distension.      Palpations: Abdomen is soft.      Tenderness: There is no abdominal tenderness.   Musculoskeletal:      Cervical back: Normal range of motion.      Left knee: Swelling and effusion present. Decreased range of motion. Tenderness present.   Skin:     Findings: No erythema.      Comments: Skin graft scars b/l LEs    Neurological:      Mental Status: She is alert and oriented to person, place, and time. Mental status is at baseline.   Psychiatric:         Mood and Affect: Mood normal.         Behavior: Behavior normal.         Thought Content: Thought content normal.       Fluids    Intake/Output Summary (Last 24 hours) at 12/27/2022 1256  Last data filed at 12/27/2022 0622  Gross per 24 hour   Intake 553.04 ml   Output --   Net 553.04 ml       Laboratory  Recent Labs     12/26/22 2202 12/27/22  0316   WBC 7.0 7.1   RBC 3.41* 3.33*   HEMOGLOBIN 9.8* 9.8*   HEMATOCRIT 30.7* 29.8*   MCV 90.0 89.5   MCH 28.7 29.4   MCHC 31.9* 32.9*   RDW 57.9* 57.8*   PLATELETCT 58* 59*   MPV 10.1 10.7     Recent Labs     12/26/22 2202 12/27/22 0316   SODIUM 130* 130*   POTASSIUM 4.3 4.1   CHLORIDE 103 103   CO2 19* 17*   GLUCOSE 128* 114*   BUN 25* 24*   CREATININE 0.84 0.69   CALCIUM 8.4* 8.1*                   Imaging  DX-CHEST-PORTABLE (1 VIEW)   Final Result      1.  No acute cardiopulmonary abnormality.   2.  Prior granulomatous exposure.         DX-KNEE 3 VIEWS LEFT   Final Result      1.  Revised left knee arthroplasty is unchanged since prior. No evidence of hardware complication.   2.  No acute fracture or dislocation.   3.  Marked soft tissue swelling and suspected knee effusion.           Assessment/Plan  * Septic joint (HCC)- (present on admission)  Assessment & Plan  Patient has a history of group B strep bacteremia secondary to infection of tibial component of left arthroplasty hardware  Also with septic L shoulder  Status post antibiotic spacer placement by orthopedics (Dr. Luz)  Comes in with left knee pain and swelling and L shoulder pain  Arthrocentesis in the ER concerning for septic knee  Synovial cultures growing group B strep  Orthopedics consulted  Going for washout 12/28  N.p.o. at midnight  Pain mgmt  PT/OT  MRI Shoulder    Acute encephalopathy  Assessment & Plan  Likely in setting of infection +/- hepatic  encephalopathy  Ammonia normal  Treat infection    GERD (gastroesophageal reflux disease)  Assessment & Plan  Continue home omeprazole    Type 2 diabetes mellitus (HCC)  Assessment & Plan  Diet controlled  SSI    Atrial fibrillation (HCC)- (present on admission)  Assessment & Plan  Continue home dronedarone 400 mg twice daily  Holding Eliquis    Cirrhosis (HCC)- (present on admission)  Assessment & Plan  In setting of alcohol use however no EtOH in over a year  Continue home lactulose    Hypertension- (present on admission)  Assessment & Plan  Home regimen: Losartan 100 mg daily, felodipine 5 mg q. evening, Lasix 20 mg daily  Inpatient regimen: continue home meds    Thrombocytopenia (HCC)- (present on admission)  Assessment & Plan  Likely in setting of cirrhosis  Holding Eliquis  Platelets stable  Continue to monitor           VTE prophylaxis: SCDs/TEDs    I have performed a physical exam and reviewed and updated ROS and Plan today (12/27/2022). In review of yesterday's note (12/26/2022), there are no changes except as documented above.

## 2022-12-27 NOTE — ASSESSMENT & PLAN NOTE
-Holding eliquis as patient may need surgery with Orthopedics  -Platelets 58; no need to transfuse at this time, consider if going for procedure

## 2022-12-28 ENCOUNTER — ANESTHESIA (OUTPATIENT)
Dept: SURGERY | Facility: MEDICAL CENTER | Age: 62
DRG: 486 | End: 2022-12-28
Payer: MEDICAID

## 2022-12-28 ENCOUNTER — ANESTHESIA EVENT (OUTPATIENT)
Dept: SURGERY | Facility: MEDICAL CENTER | Age: 62
DRG: 486 | End: 2022-12-28
Payer: MEDICAID

## 2022-12-28 ENCOUNTER — APPOINTMENT (OUTPATIENT)
Dept: RADIOLOGY | Facility: MEDICAL CENTER | Age: 62
DRG: 486 | End: 2022-12-28
Attending: INTERNAL MEDICINE
Payer: MEDICAID

## 2022-12-28 ENCOUNTER — APPOINTMENT (OUTPATIENT)
Dept: RADIOLOGY | Facility: MEDICAL CENTER | Age: 62
DRG: 486 | End: 2022-12-28
Attending: ORTHOPAEDIC SURGERY
Payer: MEDICAID

## 2022-12-28 PROBLEM — R82.71 ASYMPTOMATIC BACTERIURIA: Status: RESOLVED | Noted: 2022-12-27 | Resolved: 2022-12-28

## 2022-12-28 PROBLEM — E87.20 METABOLIC ACIDOSIS: Status: ACTIVE | Noted: 2022-12-28

## 2022-12-28 LAB
ANION GAP SERPL CALC-SCNC: 9 MMOL/L (ref 7–16)
ANISOCYTOSIS BLD QL SMEAR: ABNORMAL
BACTERIA FLD AEROBE CULT: ABNORMAL
BACTERIA FLD AEROBE CULT: ABNORMAL
BACTERIA UR CULT: ABNORMAL
BACTERIA UR CULT: ABNORMAL
BASOPHILS # BLD AUTO: 0.9 % (ref 0–1.8)
BASOPHILS # BLD: 0.07 K/UL (ref 0–0.12)
BUN SERPL-MCNC: 22 MG/DL (ref 8–22)
BURR CELLS BLD QL SMEAR: NORMAL
CALCIUM SERPL-MCNC: 7.8 MG/DL (ref 8.5–10.5)
CHLORIDE SERPL-SCNC: 113 MMOL/L (ref 96–112)
CO2 SERPL-SCNC: 16 MMOL/L (ref 20–33)
CREAT SERPL-MCNC: 0.72 MG/DL (ref 0.5–1.4)
EOSINOPHIL # BLD AUTO: 0 K/UL (ref 0–0.51)
EOSINOPHIL NFR BLD: 0 % (ref 0–6.9)
ERYTHROCYTE [DISTWIDTH] IN BLOOD BY AUTOMATED COUNT: 59.4 FL (ref 35.9–50)
GFR SERPLBLD CREATININE-BSD FMLA CKD-EPI: 94 ML/MIN/1.73 M 2
GLUCOSE BLD STRIP.AUTO-MCNC: 103 MG/DL (ref 65–99)
GLUCOSE BLD STRIP.AUTO-MCNC: 116 MG/DL (ref 65–99)
GLUCOSE BLD STRIP.AUTO-MCNC: 126 MG/DL (ref 65–99)
GLUCOSE BLD STRIP.AUTO-MCNC: 146 MG/DL (ref 65–99)
GLUCOSE BLD STRIP.AUTO-MCNC: 174 MG/DL (ref 65–99)
GLUCOSE SERPL-MCNC: 114 MG/DL (ref 65–99)
GRAM STN SPEC: ABNORMAL
GRAM STN SPEC: NORMAL
HCT VFR BLD AUTO: 31.7 % (ref 37–47)
HGB BLD-MCNC: 10 G/DL (ref 12–16)
LYMPHOCYTES # BLD AUTO: 0.51 K/UL (ref 1–4.8)
LYMPHOCYTES NFR BLD: 6.1 % (ref 22–41)
MAGNESIUM SERPL-MCNC: 2.1 MG/DL (ref 1.5–2.5)
MANUAL DIFF BLD: NORMAL
MCH RBC QN AUTO: 29.2 PG (ref 27–33)
MCHC RBC AUTO-ENTMCNC: 31.5 G/DL (ref 33.6–35)
MCV RBC AUTO: 92.4 FL (ref 81.4–97.8)
MICROCYTES BLD QL SMEAR: ABNORMAL
MONOCYTES # BLD AUTO: 0.29 K/UL (ref 0–0.85)
MONOCYTES NFR BLD AUTO: 3.5 % (ref 0–13.4)
MORPHOLOGY BLD-IMP: NORMAL
NEUTROPHILS # BLD AUTO: 7.43 K/UL (ref 2–7.15)
NEUTROPHILS NFR BLD: 89.5 % (ref 44–72)
NRBC # BLD AUTO: 0 K/UL
NRBC BLD-RTO: 0 /100 WBC
OVALOCYTES BLD QL SMEAR: NORMAL
PLATELET # BLD AUTO: 82 K/UL (ref 164–446)
PLATELET BLD QL SMEAR: NORMAL
PMV BLD AUTO: 11.7 FL (ref 9–12.9)
POIKILOCYTOSIS BLD QL SMEAR: NORMAL
POTASSIUM SERPL-SCNC: 3.9 MMOL/L (ref 3.6–5.5)
RBC # BLD AUTO: 3.43 M/UL (ref 4.2–5.4)
RBC BLD AUTO: PRESENT
SCCMEC + MECA PNL NOSE NAA+PROBE: NEGATIVE
SIGNIFICANT IND 70042: ABNORMAL
SIGNIFICANT IND 70042: ABNORMAL
SIGNIFICANT IND 70042: NORMAL
SITE SITE: ABNORMAL
SITE SITE: ABNORMAL
SITE SITE: NORMAL
SODIUM SERPL-SCNC: 138 MMOL/L (ref 135–145)
SOURCE SOURCE: ABNORMAL
SOURCE SOURCE: ABNORMAL
SOURCE SOURCE: NORMAL
WBC # BLD AUTO: 8.3 K/UL (ref 4.8–10.8)

## 2022-12-28 PROCEDURE — 700102 HCHG RX REV CODE 250 W/ 637 OVERRIDE(OP): Performed by: ANESTHESIOLOGY

## 2022-12-28 PROCEDURE — 80048 BASIC METABOLIC PNL TOTAL CA: CPT

## 2022-12-28 PROCEDURE — 82962 GLUCOSE BLOOD TEST: CPT | Mod: 91

## 2022-12-28 PROCEDURE — C1729 CATH, DRAINAGE: HCPCS | Performed by: ORTHOPAEDIC SURGERY

## 2022-12-28 PROCEDURE — C1713 ANCHOR/SCREW BN/BN,TIS/BN: HCPCS | Performed by: ORTHOPAEDIC SURGERY

## 2022-12-28 PROCEDURE — 700102 HCHG RX REV CODE 250 W/ 637 OVERRIDE(OP): Performed by: ORTHOPAEDIC SURGERY

## 2022-12-28 PROCEDURE — 87070 CULTURE OTHR SPECIMN AEROBIC: CPT | Mod: 91

## 2022-12-28 PROCEDURE — 700111 HCHG RX REV CODE 636 W/ 250 OVERRIDE (IP): Performed by: ORTHOPAEDIC SURGERY

## 2022-12-28 PROCEDURE — 770001 HCHG ROOM/CARE - MED/SURG/GYN PRIV*

## 2022-12-28 PROCEDURE — 160002 HCHG RECOVERY MINUTES (STAT): Performed by: ORTHOPAEDIC SURGERY

## 2022-12-28 PROCEDURE — 700105 HCHG RX REV CODE 258

## 2022-12-28 PROCEDURE — 76942 ECHO GUIDE FOR BIOPSY: CPT | Mod: 26 | Performed by: ANESTHESIOLOGY

## 2022-12-28 PROCEDURE — 700111 HCHG RX REV CODE 636 W/ 250 OVERRIDE (IP): Performed by: STUDENT IN AN ORGANIZED HEALTH CARE EDUCATION/TRAINING PROGRAM

## 2022-12-28 PROCEDURE — 160048 HCHG OR STATISTICAL LEVEL 1-5: Performed by: ORTHOPAEDIC SURGERY

## 2022-12-28 PROCEDURE — 160036 HCHG PACU - EA ADDL 30 MINS PHASE I: Performed by: ORTHOPAEDIC SURGERY

## 2022-12-28 PROCEDURE — 64447 NJX AA&/STRD FEMORAL NRV IMG: CPT | Performed by: ORTHOPAEDIC SURGERY

## 2022-12-28 PROCEDURE — A9270 NON-COVERED ITEM OR SERVICE: HCPCS | Performed by: STUDENT IN AN ORGANIZED HEALTH CARE EDUCATION/TRAINING PROGRAM

## 2022-12-28 PROCEDURE — 99255 IP/OBS CONSLTJ NEW/EST HI 80: CPT | Performed by: INTERNAL MEDICINE

## 2022-12-28 PROCEDURE — 3E0T3BZ INTRODUCTION OF ANESTHETIC AGENT INTO PERIPHERAL NERVES AND PLEXI, PERCUTANEOUS APPROACH: ICD-10-PCS | Performed by: ANESTHESIOLOGY

## 2022-12-28 PROCEDURE — 85007 BL SMEAR W/DIFF WBC COUNT: CPT

## 2022-12-28 PROCEDURE — 700102 HCHG RX REV CODE 250 W/ 637 OVERRIDE(OP): Performed by: STUDENT IN AN ORGANIZED HEALTH CARE EDUCATION/TRAINING PROGRAM

## 2022-12-28 PROCEDURE — 700111 HCHG RX REV CODE 636 W/ 250 OVERRIDE (IP): Performed by: INTERNAL MEDICINE

## 2022-12-28 PROCEDURE — 700101 HCHG RX REV CODE 250: Performed by: ANESTHESIOLOGY

## 2022-12-28 PROCEDURE — 0SRD0EZ REPLACEMENT OF LEFT KNEE JOINT WITH ARTICULATING SPACER, OPEN APPROACH: ICD-10-PCS | Performed by: ORTHOPAEDIC SURGERY

## 2022-12-28 PROCEDURE — 87077 CULTURE AEROBIC IDENTIFY: CPT | Mod: 91

## 2022-12-28 PROCEDURE — 83735 ASSAY OF MAGNESIUM: CPT

## 2022-12-28 PROCEDURE — 700111 HCHG RX REV CODE 636 W/ 250 OVERRIDE (IP)

## 2022-12-28 PROCEDURE — 73560 X-RAY EXAM OF KNEE 1 OR 2: CPT | Mod: LT

## 2022-12-28 PROCEDURE — 700105 HCHG RX REV CODE 258: Performed by: STUDENT IN AN ORGANIZED HEALTH CARE EDUCATION/TRAINING PROGRAM

## 2022-12-28 PROCEDURE — 87075 CULTR BACTERIA EXCEPT BLOOD: CPT | Mod: 91

## 2022-12-28 PROCEDURE — A9270 NON-COVERED ITEM OR SERVICE: HCPCS | Performed by: ORTHOPAEDIC SURGERY

## 2022-12-28 PROCEDURE — 87015 SPECIMEN INFECT AGNT CONCNTJ: CPT | Mod: 91

## 2022-12-28 PROCEDURE — C1776 JOINT DEVICE (IMPLANTABLE): HCPCS | Performed by: ORTHOPAEDIC SURGERY

## 2022-12-28 PROCEDURE — 700105 HCHG RX REV CODE 258: Performed by: ANESTHESIOLOGY

## 2022-12-28 PROCEDURE — 87205 SMEAR GRAM STAIN: CPT | Mod: 91

## 2022-12-28 PROCEDURE — 160042 HCHG SURGERY MINUTES - EA ADDL 1 MIN LEVEL 5: Performed by: ORTHOPAEDIC SURGERY

## 2022-12-28 PROCEDURE — 73030 X-RAY EXAM OF SHOULDER: CPT | Mod: RT

## 2022-12-28 PROCEDURE — 0SPD0EZ REMOVAL OF ARTICULATING SPACER FROM LEFT KNEE JOINT, OPEN APPROACH: ICD-10-PCS | Performed by: ORTHOPAEDIC SURGERY

## 2022-12-28 PROCEDURE — 01214 ANES OPEN PX TOT HIP ARTHRP: CPT | Performed by: ANESTHESIOLOGY

## 2022-12-28 PROCEDURE — 85025 COMPLETE CBC W/AUTO DIFF WBC: CPT

## 2022-12-28 PROCEDURE — 160035 HCHG PACU - 1ST 60 MINS PHASE I: Performed by: ORTHOPAEDIC SURGERY

## 2022-12-28 PROCEDURE — 700111 HCHG RX REV CODE 636 W/ 250 OVERRIDE (IP): Performed by: ANESTHESIOLOGY

## 2022-12-28 PROCEDURE — 64447 NJX AA&/STRD FEMORAL NRV IMG: CPT | Mod: 59,LT | Performed by: ANESTHESIOLOGY

## 2022-12-28 PROCEDURE — 700102 HCHG RX REV CODE 250 W/ 637 OVERRIDE(OP): Performed by: INTERNAL MEDICINE

## 2022-12-28 PROCEDURE — 700101 HCHG RX REV CODE 250: Performed by: ORTHOPAEDIC SURGERY

## 2022-12-28 PROCEDURE — A9270 NON-COVERED ITEM OR SERVICE: HCPCS | Performed by: ANESTHESIOLOGY

## 2022-12-28 PROCEDURE — 99233 SBSQ HOSP IP/OBS HIGH 50: CPT | Performed by: INTERNAL MEDICINE

## 2022-12-28 PROCEDURE — 36415 COLL VENOUS BLD VENIPUNCTURE: CPT

## 2022-12-28 PROCEDURE — A9270 NON-COVERED ITEM OR SERVICE: HCPCS | Performed by: INTERNAL MEDICINE

## 2022-12-28 PROCEDURE — 160009 HCHG ANES TIME/MIN: Performed by: ORTHOPAEDIC SURGERY

## 2022-12-28 PROCEDURE — 700105 HCHG RX REV CODE 258: Performed by: INTERNAL MEDICINE

## 2022-12-28 PROCEDURE — 502000 HCHG MISC OR IMPLANTS RC 0278: Performed by: ORTHOPAEDIC SURGERY

## 2022-12-28 PROCEDURE — 160031 HCHG SURGERY MINUTES - 1ST 30 MINS LEVEL 5: Performed by: ORTHOPAEDIC SURGERY

## 2022-12-28 DEVICE — BONE CEMENT SIMPLEX ANTIBIO - (10/PK): Type: IMPLANTABLE DEVICE | Site: KNEE | Status: FUNCTIONAL

## 2022-12-28 DEVICE — BEADS STIMULAN CALCIUM SULFATE: Type: IMPLANTABLE DEVICE | Site: KNEE | Status: FUNCTIONAL

## 2022-12-28 DEVICE — IMPLANTABLE DEVICE: Type: IMPLANTABLE DEVICE | Site: KNEE | Status: FUNCTIONAL

## 2022-12-28 RX ORDER — SODIUM CHLORIDE, SODIUM LACTATE, POTASSIUM CHLORIDE, CALCIUM CHLORIDE 600; 310; 30; 20 MG/100ML; MG/100ML; MG/100ML; MG/100ML
INJECTION, SOLUTION INTRAVENOUS
Status: DISCONTINUED | OUTPATIENT
Start: 2022-12-28 | End: 2022-12-28 | Stop reason: SURG

## 2022-12-28 RX ORDER — OXYCODONE HCL 5 MG/5 ML
10 SOLUTION, ORAL ORAL
Status: COMPLETED | OUTPATIENT
Start: 2022-12-28 | End: 2022-12-28

## 2022-12-28 RX ORDER — SODIUM CHLORIDE 9 MG/ML
INJECTION, SOLUTION INTRAMUSCULAR; INTRAVENOUS; SUBCUTANEOUS
Status: DISCONTINUED | OUTPATIENT
Start: 2022-12-28 | End: 2022-12-28 | Stop reason: HOSPADM

## 2022-12-28 RX ORDER — HYDROMORPHONE HYDROCHLORIDE 1 MG/ML
0.1 INJECTION, SOLUTION INTRAMUSCULAR; INTRAVENOUS; SUBCUTANEOUS
Status: DISCONTINUED | OUTPATIENT
Start: 2022-12-28 | End: 2022-12-28 | Stop reason: HOSPADM

## 2022-12-28 RX ORDER — PHENYLEPHRINE HCL IN 0.9% NACL 0.5 MG/5ML
SYRINGE (ML) INTRAVENOUS PRN
Status: DISCONTINUED | OUTPATIENT
Start: 2022-12-28 | End: 2022-12-28 | Stop reason: SURG

## 2022-12-28 RX ORDER — MAGNESIUM CARB/ALUMINUM HYDROX 105-160MG
TABLET,CHEWABLE ORAL
Status: DISCONTINUED | OUTPATIENT
Start: 2022-12-28 | End: 2022-12-28 | Stop reason: HOSPADM

## 2022-12-28 RX ORDER — OXYCODONE HCL 5 MG/5 ML
5 SOLUTION, ORAL ORAL
Status: COMPLETED | OUTPATIENT
Start: 2022-12-28 | End: 2022-12-28

## 2022-12-28 RX ORDER — HYDROMORPHONE HYDROCHLORIDE 1 MG/ML
0.4 INJECTION, SOLUTION INTRAMUSCULAR; INTRAVENOUS; SUBCUTANEOUS
Status: DISCONTINUED | OUTPATIENT
Start: 2022-12-28 | End: 2022-12-28 | Stop reason: HOSPADM

## 2022-12-28 RX ORDER — HYDROMORPHONE HYDROCHLORIDE 2 MG/ML
INJECTION, SOLUTION INTRAMUSCULAR; INTRAVENOUS; SUBCUTANEOUS PRN
Status: DISCONTINUED | OUTPATIENT
Start: 2022-12-28 | End: 2022-12-28 | Stop reason: SURG

## 2022-12-28 RX ORDER — SODIUM CHLORIDE, SODIUM LACTATE, POTASSIUM CHLORIDE, CALCIUM CHLORIDE 600; 310; 30; 20 MG/100ML; MG/100ML; MG/100ML; MG/100ML
INJECTION, SOLUTION INTRAVENOUS CONTINUOUS
Status: DISCONTINUED | OUTPATIENT
Start: 2022-12-28 | End: 2022-12-28 | Stop reason: HOSPADM

## 2022-12-28 RX ORDER — HYDROMORPHONE HYDROCHLORIDE 1 MG/ML
0.2 INJECTION, SOLUTION INTRAMUSCULAR; INTRAVENOUS; SUBCUTANEOUS
Status: DISCONTINUED | OUTPATIENT
Start: 2022-12-28 | End: 2022-12-28 | Stop reason: HOSPADM

## 2022-12-28 RX ORDER — HALOPERIDOL 5 MG/ML
1 INJECTION INTRAMUSCULAR
Status: DISCONTINUED | OUTPATIENT
Start: 2022-12-28 | End: 2022-12-28 | Stop reason: HOSPADM

## 2022-12-28 RX ORDER — MIDAZOLAM HYDROCHLORIDE 1 MG/ML
INJECTION INTRAMUSCULAR; INTRAVENOUS PRN
Status: DISCONTINUED | OUTPATIENT
Start: 2022-12-28 | End: 2022-12-28 | Stop reason: SURG

## 2022-12-28 RX ORDER — DIPHENHYDRAMINE HYDROCHLORIDE 50 MG/ML
12.5 INJECTION INTRAMUSCULAR; INTRAVENOUS
Status: DISCONTINUED | OUTPATIENT
Start: 2022-12-28 | End: 2022-12-28 | Stop reason: HOSPADM

## 2022-12-28 RX ORDER — EPINEPHRINE 1 MG/ML(1)
AMPUL (ML) INJECTION
Status: DISCONTINUED | OUTPATIENT
Start: 2022-12-28 | End: 2022-12-28 | Stop reason: HOSPADM

## 2022-12-28 RX ORDER — GENTAMICIN SULFATE 40 MG/ML
INJECTION, SOLUTION INTRAMUSCULAR; INTRAVENOUS
Status: DISCONTINUED | OUTPATIENT
Start: 2022-12-28 | End: 2022-12-28 | Stop reason: HOSPADM

## 2022-12-28 RX ORDER — TRANEXAMIC ACID 100 MG/ML
INJECTION, SOLUTION INTRAVENOUS PRN
Status: DISCONTINUED | OUTPATIENT
Start: 2022-12-28 | End: 2022-12-28 | Stop reason: SURG

## 2022-12-28 RX ORDER — BUPIVACAINE HYDROCHLORIDE 2.5 MG/ML
INJECTION, SOLUTION EPIDURAL; INFILTRATION; INTRACAUDAL PRN
Status: DISCONTINUED | OUTPATIENT
Start: 2022-12-28 | End: 2022-12-28 | Stop reason: SURG

## 2022-12-28 RX ORDER — VANCOMYCIN HCL 900 MCG/MG
POWDER (GRAM) MISCELLANEOUS
Status: DISCONTINUED | OUTPATIENT
Start: 2022-12-28 | End: 2022-12-28 | Stop reason: HOSPADM

## 2022-12-28 RX ORDER — ONDANSETRON 2 MG/ML
4 INJECTION INTRAMUSCULAR; INTRAVENOUS
Status: DISCONTINUED | OUTPATIENT
Start: 2022-12-28 | End: 2022-12-28 | Stop reason: HOSPADM

## 2022-12-28 RX ORDER — ROCURONIUM BROMIDE 10 MG/ML
INJECTION, SOLUTION INTRAVENOUS PRN
Status: DISCONTINUED | OUTPATIENT
Start: 2022-12-28 | End: 2022-12-28 | Stop reason: SURG

## 2022-12-28 RX ORDER — LIDOCAINE HYDROCHLORIDE 40 MG/ML
SOLUTION TOPICAL PRN
Status: DISCONTINUED | OUTPATIENT
Start: 2022-12-28 | End: 2022-12-28 | Stop reason: SURG

## 2022-12-28 RX ORDER — ROPIVACAINE HYDROCHLORIDE 5 MG/ML
INJECTION, SOLUTION EPIDURAL; INFILTRATION; PERINEURAL
Status: DISCONTINUED | OUTPATIENT
Start: 2022-12-28 | End: 2022-12-28 | Stop reason: HOSPADM

## 2022-12-28 RX ADMIN — SODIUM CHLORIDE, POTASSIUM CHLORIDE, SODIUM LACTATE AND CALCIUM CHLORIDE: 600; 310; 30; 20 INJECTION, SOLUTION INTRAVENOUS at 14:25

## 2022-12-28 RX ADMIN — GABAPENTIN 200 MG: 100 CAPSULE ORAL at 17:25

## 2022-12-28 RX ADMIN — DRONEDARONE 400 MG: 400 TABLET, FILM COATED ORAL at 08:34

## 2022-12-28 RX ADMIN — ACETAMINOPHEN 650 MG: 325 TABLET ORAL at 02:21

## 2022-12-28 RX ADMIN — PROPOFOL 150 MG: 10 INJECTION, EMULSION INTRAVENOUS at 13:11

## 2022-12-28 RX ADMIN — OXYCODONE HYDROCHLORIDE 10 MG: 10 TABLET ORAL at 17:22

## 2022-12-28 RX ADMIN — OMEPRAZOLE 20 MG: 20 CAPSULE, DELAYED RELEASE ORAL at 17:24

## 2022-12-28 RX ADMIN — TRANEXAMIC ACID 1000 MG: 100 INJECTION, SOLUTION INTRAVENOUS at 15:06

## 2022-12-28 RX ADMIN — HYDROMORPHONE HYDROCHLORIDE 1 MG: 2 INJECTION INTRAMUSCULAR; INTRAVENOUS; SUBCUTANEOUS at 15:06

## 2022-12-28 RX ADMIN — Medication 220 MG: at 05:11

## 2022-12-28 RX ADMIN — HYDROMORPHONE HYDROCHLORIDE 1 MG: 2 INJECTION INTRAMUSCULAR; INTRAVENOUS; SUBCUTANEOUS at 13:35

## 2022-12-28 RX ADMIN — SUGAMMADEX 200 MG: 100 INJECTION, SOLUTION INTRAVENOUS at 15:39

## 2022-12-28 RX ADMIN — OXYCODONE HYDROCHLORIDE 10 MG: 10 TABLET ORAL at 20:58

## 2022-12-28 RX ADMIN — Medication 100 MCG: at 15:20

## 2022-12-28 RX ADMIN — HYDROMORPHONE HYDROCHLORIDE 1 MG: 2 INJECTION INTRAMUSCULAR; INTRAVENOUS; SUBCUTANEOUS at 13:11

## 2022-12-28 RX ADMIN — GABAPENTIN 200 MG: 100 CAPSULE ORAL at 05:11

## 2022-12-28 RX ADMIN — OXYCODONE HYDROCHLORIDE 10 MG: 10 TABLET ORAL at 11:52

## 2022-12-28 RX ADMIN — SODIUM BICARBONATE 75 MEQ: 84 INJECTION, SOLUTION INTRAVENOUS at 10:30

## 2022-12-28 RX ADMIN — FELODIPINE 5 MG: 5 TABLET, EXTENDED RELEASE ORAL at 17:24

## 2022-12-28 RX ADMIN — NICOTINE 14 MG: 14 PATCH TRANSDERMAL at 05:12

## 2022-12-28 RX ADMIN — DRONEDARONE 400 MG: 400 TABLET, FILM COATED ORAL at 17:24

## 2022-12-28 RX ADMIN — ROCURONIUM BROMIDE 50 MG: 10 INJECTION, SOLUTION INTRAVENOUS at 13:11

## 2022-12-28 RX ADMIN — SODIUM CHLORIDE, POTASSIUM CHLORIDE, SODIUM LACTATE AND CALCIUM CHLORIDE: 600; 310; 30; 20 INJECTION, SOLUTION INTRAVENOUS at 13:09

## 2022-12-28 RX ADMIN — OXYCODONE HYDROCHLORIDE 10 MG: 10 TABLET ORAL at 05:16

## 2022-12-28 RX ADMIN — BUPIVACAINE HYDROCHLORIDE 15 ML: 2.5 INJECTION, SOLUTION EPIDURAL; INFILTRATION; INTRACAUDAL; PERINEURAL at 15:32

## 2022-12-28 RX ADMIN — VANCOMYCIN HYDROCHLORIDE 1750 MG: 500 INJECTION, POWDER, LYOPHILIZED, FOR SOLUTION INTRAVENOUS at 02:19

## 2022-12-28 RX ADMIN — LACTULOSE 15 ML: 20 SOLUTION ORAL at 05:10

## 2022-12-28 RX ADMIN — ATORVASTATIN CALCIUM 20 MG: 20 TABLET, FILM COATED ORAL at 20:58

## 2022-12-28 RX ADMIN — OXYCODONE HYDROCHLORIDE 10 MG: 10 TABLET ORAL at 00:23

## 2022-12-28 RX ADMIN — Medication 200 MCG: at 15:23

## 2022-12-28 RX ADMIN — LIDOCAINE HYDROCHLORIDE 4 ML: 40 SOLUTION TOPICAL at 13:13

## 2022-12-28 RX ADMIN — OMEPRAZOLE 20 MG: 20 CAPSULE, DELAYED RELEASE ORAL at 05:11

## 2022-12-28 RX ADMIN — MIDAZOLAM HYDROCHLORIDE 2 MG: 1 INJECTION, SOLUTION INTRAMUSCULAR; INTRAVENOUS at 13:11

## 2022-12-28 RX ADMIN — SODIUM CHLORIDE: 9 INJECTION, SOLUTION INTRAVENOUS at 00:30

## 2022-12-28 RX ADMIN — AMPICILLIN AND SULBACTAM 3 G: 1; 2 INJECTION, POWDER, FOR SOLUTION INTRAMUSCULAR; INTRAVENOUS at 00:32

## 2022-12-28 RX ADMIN — AMPICILLIN AND SULBACTAM 3 G: 1; 2 INJECTION, POWDER, FOR SOLUTION INTRAMUSCULAR; INTRAVENOUS at 05:55

## 2022-12-28 RX ADMIN — OXYCODONE HYDROCHLORIDE 10 MG: 5 SOLUTION ORAL at 16:06

## 2022-12-28 RX ADMIN — Medication 200 MCG: at 15:32

## 2022-12-28 RX ADMIN — PROPOFOL 50 MG: 10 INJECTION, EMULSION INTRAVENOUS at 15:27

## 2022-12-28 ASSESSMENT — PAIN DESCRIPTION - PAIN TYPE
TYPE: SURGICAL PAIN
TYPE: ACUTE PAIN
TYPE: SURGICAL PAIN
TYPE: ACUTE PAIN
TYPE: SURGICAL PAIN
TYPE: SURGICAL PAIN

## 2022-12-28 ASSESSMENT — ENCOUNTER SYMPTOMS
EYE REDNESS: 0
VOMITING: 0
NAUSEA: 0
DIZZINESS: 0
FEVER: 1
SHORTNESS OF BREATH: 0

## 2022-12-28 ASSESSMENT — LIFESTYLE VARIABLES: SUBSTANCE_ABUSE: 0

## 2022-12-28 ASSESSMENT — PAIN SCALES - GENERAL: PAIN_LEVEL: 3

## 2022-12-28 NOTE — ASSESSMENT & PLAN NOTE
Patient c/o pain with urination and lower abd discomfort  UA c/f infection  ucx growing e.coli resistant to PCN  S/p completion of treatment     Recheck UA today- continues to be febrile

## 2022-12-28 NOTE — PROGRESS NOTES
Assumed care of patient at 0645. Bedside report received. Assessment complete.  AA&Ox4. Denies CP/SOB.  Reporting 0/10 pain. Declined intervention at this time.  Educated patient regarding pharmacologic and non pharmacologic modalities for pain management.  Skin per flowsheets  NPO at this time pending procedure. Denies N/V.  + void. + BM. Last BM 12/28  Pt refusing to ambulate d/t pain.  All needs met at this time. Call light within reach. Pt calls appropriately. Bed low and locked, non skid socks in place. Hourly rounding in place.

## 2022-12-28 NOTE — CONSULTS
[facility] INFECTIOUS DISEASES INPATIENT CONSULT NOTE     Date of Service: 12/28/2022    Consult Requested By: Isatu Ortiz M.D.    Reason for Consultation: Left knee prosthetic joint infection    Chief Complaint: Left knee infection    History of Present Illness:     April Alicia is a 62 y.o. female admitted 12/26/2022.  Patient with history of alcohol and cocaine abuse, cirrhosis, asthma, A. fib on apixaban, type 2 diabetes, hypertension, recently admitted in July 2022 with group B strep bacteremia and left knee prosthetic joint infection and left shoulder septic arthritis, subdeltoid abscess.  Plan was for discharge to a skilled nursing facility to continue ceftriaxone for 6 weeks through 8/30/2022.  As far as we know, this course was completed, however patient seems to report that the gave her only 4 weeks.  Patient is now readmitted after 1 day of recurrent left knee pain and swelling, fevers.  Arthrocentesis was bloody, showed 21,000 white cells, neutrophil predominant.  Patient will be taken back to the OR today.    Review of Systems:  All other systems reviewed and are negative expect as noted in HPI    Past Medical History:   Diagnosis Date    Arrhythmia     Arthritis     OA in knees    ASTHMA     Blood clotting disorder (HCC) 2018    right leg blood clot    Dental disorder     upper and lower dentures    Diabetes     Emphysema of lung (HCC)     Heart abnormality     leakage in left valve    Heart burn     left knee    Heart valve disease     Hypertension     Infection 9/4/2017    Infection 2018    Right knee after total knee    Liver disease     Pneumonia 02/2020    Seizure disorder (HCC)        Past Surgical History:   Procedure Laterality Date    PB REVISE KNEE JOINT REPLACE,ALL PARTS Left 7/19/2022    Procedure: REVISION, TOTAL ARTHROPLASTY, KNEE, ALL COMPONENTS - ANTIBIOTIC SPACER;  Surgeon: Wild Luz M.D.;  Location: SURGERY [facility];  Service: Orthopedics    IRRIGATION &  DEBRIDEMENT GENERAL Left 2022    Procedure: IRRIGATION AND DEBRIDEMENT, SHOULDER;  Surgeon: Obdulio Gray M.D.;  Location: SURGERY [facility];  Service: Orthopedics    PB TOTAL KNEE ARTHROPLASTY Left 2020    Procedure: ARTHROPLASTY, KNEE, TOTAL;  Surgeon: Víctor Faustin M.D.;  Location: SURGERY [facility];  Service: Orthopedics    KNEE ARTHROPLASTY TOTAL Right 2018    IRRIGATION & DEBRIDEMENT ORTHO Right 9/3/2017    Procedure: IRRIGATION & DEBRIDEMENT ORTHO, POLY EXCHANGE;  Surgeon: Jerome Russell M.D.;  Location: SURGERY [facility];  Service: Orthopedics    COLONOSCOPY WITH CLIPPING  10/28/2015    Procedure: COLONOSCOPY WITH CLIPPING;  Surgeon: Ruben Colon M.D.;  Location: ENDOSCOPY [facility];  Service:     COLONOSCOPY WITH SCLEROTHERAPY  10/28/2015    Procedure: COLONOSCOPY WITH SCLEROTHERAPY;  Surgeon: Ruben Colon M.D.;  Location: ENDOSCOPY [facility];  Service:     COLONOSCOPY WITH TATTOOING  10/28/2015    Procedure: COLONOSCOPY WITH TATTOOING;  Surgeon: Ruben Colon M.D.;  Location: ENDOSCOPY [facility];  Service:     GYN SURGERY      hysteroscoopy    GYN SURGERY      tubal ligation       History reviewed. No pertinent family history.    Social History     Socioeconomic History    Marital status: Single     Spouse name: Not on file    Number of children: Not on file    Years of education: Not on file    Highest education level: Not on file   Occupational History    Not on file   Tobacco Use    Smoking status: Former     Types: Cigarettes     Quit date: 2016     Years since quittin.0    Smokeless tobacco: Former     Quit date: 2021    Tobacco comments:     a few a day when I can   Vaping Use    Vaping Use: Never used   Substance and Sexual Activity    Alcohol use: No    Drug use: No    Sexual activity: Not on file   Other Topics Concern    Not on file   Social History Narrative    Not on file     Social Determinants of Health      Financial Resource Strain: Not on file   Food Insecurity: Not on file   Transportation Needs: Not on file   Physical Activity: Not on file   Stress: Not on file   Social Connections: Not on file   Intimate Partner Violence: Not on file   Housing Stability: Not on file       Allergies   Allergen Reactions    Nkda [No Known Drug Allergy]        Medications:    Current Facility-Administered Medications:     sodium bicarbonate 75 mEq in 1/2 NS 1,000 mL infusion, 75 mEq, Intravenous, Continuous, Isatu Ortiz M.D.    [START ON 12/29/2022] cefTRIAXone (Rocephin) syringe 2,000 mg, 2,000 mg, Intravenous, Q24HRS, Carmelo Amezquita M.D.    atorvastatin (LIPITOR) tablet 20 mg, 20 mg, Oral, Nightly, Marlon Mayer M.D., 20 mg at 12/27/22 2045    felodipine ER (Plendil) tablet 5 mg, 5 mg, Oral, Q EVENING, Marlon Mayer M.D., 5 mg at 12/27/22 1723    lactulose 20 GM/30ML solution 15 mL, 15 mL, Oral, DAILY, Marlon Mayer M.D., 15 mL at 12/28/22 0510    losartan (COZAAR) tablet 100 mg, 100 mg, Oral, DAILY, Marlon Mayer M.D., 100 mg at 12/27/22 0635    dronedarone (MULTAQ) tablet 400 mg, 400 mg, Oral, BID WITH MEALS, Marlon Mayer M.D., 400 mg at 12/28/22 0834    omeprazole (PRILOSEC) capsule 20 mg, 20 mg, Oral, Q12HRS, Marlon Mayer M.D., 20 mg at 12/28/22 0511    vitamin D2 (Ergocalciferol) (Drisdol) capsule 50,000 Units, 50,000 Units, Oral, Q7 DAYS, Marlon Myaer M.D.    zinc sulfate (ZINCATE) capsule 220 mg, 220 mg, Oral, DAILY, John Wyatt M.D., 220 mg at 12/28/22 0511    Pharmacy Consult Request ...Pain Management Review 1 Each, 1 Each, Other, PHARMACY TO DOSE, Isatu S Angel, M.D.    oxyCODONE immediate-release (ROXICODONE) tablet 5 mg, 5 mg, Oral, Q3HRS PRN **OR** oxyCODONE immediate release (ROXICODONE) tablet 10 mg, 10 mg, Oral, Q3HRS PRN, 10 mg at 12/28/22 0516 **OR** HYDROmorphone (Dilaudid) injection 0.5 mg, 0.5 mg, Intravenous, Q3HRS PRN, Isatu STEELE  "DENNIS Ortiz    gabapentin (NEURONTIN) capsule 200 mg, 200 mg, Oral, BID, Isatu Ortiz M.D., 200 mg at 22 0511    senna-docusate (PERICOLACE or SENOKOT S) 8.6-50 MG per tablet 2 Tablet, 2 Tablet, Oral, BID, 2 Tablet at 22 0636 **AND** polyethylene glycol/lytes (MIRALAX) PACKET 1 Packet, 1 Packet, Oral, QDAY PRN, 1 Packet at 22 0306 **AND** magnesium hydroxide (MILK OF MAGNESIA) suspension 30 mL, 30 mL, Oral, QDAY PRN **AND** bisacodyl (DULCOLAX) suppository 10 mg, 10 mg, Rectal, QDAY PRN, Marlon Mayer M.D.    [Held by provider] enoxaparin (Lovenox) inj 40 mg, 40 mg, Subcutaneous, DAILY AT 1800, Marlon Mayer M.D.    acetaminophen (Tylenol) tablet 650 mg, 650 mg, Oral, Q6HRS PRN, Marlon Mayer M.D., 650 mg at 22 0221    nicotine (NICODERM) 14 MG/24HR 14 mg, 14 mg, Transdermal, Daily-0600, 14 mg at 22 0512 **AND** Nicotine Replacement Patient Education Materials, , , Once **AND** nicotine polacrilex (NICORETTE) 2 MG piece 2 mg, 2 mg, Oral, Q HOUR PRN, Marlon Mayer M.D.    insulin regular (HumuLIN R,NovoLIN R) injection, 2-9 Units, Subcutaneous, 4X/DAY ACHS, 2 Units at 22 2102 **AND** POC blood glucose manual result, , , Q AC AND BEDTIME(S) **AND** NOTIFY MD and PharmD, , , Once **AND** Administer 20 grams of glucose (approximately 8 ounces of fruit juice) every 15 minutes PRN FSBG less than 70 mg/dL, , , PRN **AND** dextrose 10 % BOLUS 25 g, 25 g, Intravenous, Q15 MIN PRN, Marlon Mayer M.D.    Physical Exam:   Vital Signs: /66   Pulse 79   Temp 36.7 °C (98 °F) (Temporal)   Resp 15   Ht 1.727 m (5' 8\")   Wt 77.1 kg (170 lb)   LMP 2008   SpO2 93%   BMI 25.85 kg/m²   Temp  Av.4 °C (99.3 °F)  Min: 36.2 °C (97.2 °F)  Max: 38.7 °C (101.7 °F)  Vital signs reviewed  Constitutional: Patient is oriented to person, place, and time. Appears well-developed and well-nourished. No distress  Head: Atraumatic, " normocephalic  Eyes: Conjunctivae normal, EOM intact.   Mouth/Throat: Lips without lesions  Neck: Neck supple. No masses/lymphadenopathy  Cardiovascular: Normal rate, regular rhythm, normal S1S2 and intact distal pulses. No murmur, gallop, or friction rub. No pedal edema.  Pulmonary/Chest: No respiratory distress. Unlabored respiratory effort, lungs clear to auscultation. No wheezes or rales.   Abdominal: Soft, non tender. BS + x 4. No masses or hepatosplenomegaly.   Musculoskeletal: Left shoulder with pain with motion but well-healed incision, no swelling or erythema or increased warmth.  Left knee with swelling and increased warmth, well-healed anterior incision  Neurological: Alert and oriented to person, place, and time.  Hard of hearing  Skin: Skin is warm and dry. No rashes or embolic phenomena noted on exposed skin  Psychiatric: Normal mood and affect. Behavior is normal.     LABS:  Recent Labs     12/26/22 2202 12/27/22 0316 12/28/22  0747   WBC 7.0 7.1 8.3      Recent Labs     12/26/22 2202 12/27/22 0316 12/28/22  0747   HEMOGLOBIN 9.8* 9.8* 10.0*   HEMATOCRIT 30.7* 29.8* 31.7*   MCV 90.0 89.5 92.4   MCH 28.7 29.4 29.2   ANISOCYTOSIS 1+ 1+ 1+   PLATELETCT 58* 59* 82*       Recent Labs     12/26/22 2202 12/27/22 0316 12/28/22  0747   SODIUM 130* 130* 138   POTASSIUM 4.3 4.1 3.9   CHLORIDE 103 103 113*   CO2 19* 17* 16*   CREATININE 0.84 0.69 0.72        Recent Labs     12/26/22 2202   ALBUMIN 3.0*        MICRO:  Blood Culture   Date Value Ref Range Status   09/04/2017 No growth after 5 days of incubation.  Final     Blood Culture Hold   Date Value Ref Range Status   10/03/2020 Collected  Final        Latest pertinent labs were reviewed    IMAGING STUDIES:  As above    Hospital Course/Assessment:   April Alicia is a 62 y.o. female patient with history of alcohol and cocaine abuse, recently admitted with left knee prosthetic joint infection and left shoulder septic arthritis status post I&D  of the left shoulder and removal of left knee hardware with placement of antibiotic spacer, completed 6 weeks of IV ceftriaxone at a facility on 8/30/2022.  She unfortunately was lost to follow-up and could not make several follow-up appointments, either with ID or orthopedics.  She is now readmitted with recurrent infection of the left knee spacer    Pertinent Diagnoses:  Left knee chronic prosthetic joint infection  Group B strep infection  Recent left shoulder septic arthritis  History of alcohol and cocaine abuse    Plan:   -Okay to continue IV ceftriaxone for now, plan for OR today, will follow along    Disposition: Anticipate discharge to facility and another 6 weeks of IV antibiotics  Need for PICC line: Yes, okay to place    Plan was discussed with the primary team, Dr. Angel Amezquita M.D.    Please note that this dictation was created using voice recognition software. I have worked with technical experts from [facility] to optimize the interface.  I have made every reasonable attempt to correct obvious errors, but there may be errors of grammar and possibly content that I did not discover before finalizing the note.

## 2022-12-28 NOTE — ANESTHESIA PREPROCEDURE EVALUATION
Case: [id number] Date/Time: 12/28/22 1215    Procedures:       REVISION, TOTAL ARTHROPLASTY, KNEE, ALL COMPONENTS-LEFT      IRRIGATION AND DEBRIDEMENT, WOUND    Location: [facility] OR 12 / SURGERY [facility]    Surgeons: Wild Luz M.D.          Relevant Problems   PULMONARY   (positive) Acute exacerbation of chronic obstructive pulmonary disease (COPD) (HCC)      NEURO   (positive) Medication overuse headache   (positive) Migraine with aura and without status migrainosus, not intractable      CARDIAC   (positive) Atrial fibrillation (HCC)   (positive) Hypertension   (positive) Migraine with aura and without status migrainosus, not intractable      GI   (positive) GERD (gastroesophageal reflux disease)         (positive) Cirrhosis (HCC)   (positive) Cirrhosis, alcoholic (HCC)   (positive) Hepatitis C infection   (positive) Renal insufficiency syndrome      ENDO   (positive) Type 2 diabetes mellitus (HCC)      Other   (positive) Septic arthritis of knee, left (HCC)   (positive) Septic arthritis of shoulder, left (HCC)   (positive) Septic joint (HCC)       Physical Exam    Airway   Mallampati: II  TM distance: >3 FB  Neck ROM: full       Cardiovascular - normal exam  Rhythm: regular  Rate: normal  (-) murmur     Dental - normal exam           Pulmonary - normal exam  Breath sounds clear to auscultation     Abdominal    Neurological - normal exam                 Anesthesia Plan    ASA 2       Plan - general       Airway plan will be ETT        Plan Factors:   Patient was previously instructed to abstain from smoking on day of procedure.  Patient did not smoke on day of procedure.      Induction: intravenous    Postoperative Plan: Postoperative administration of opioids is intended.    Pertinent diagnostic labs and testing reviewed    Informed Consent:    Anesthetic plan and risks discussed with patient.    Use of blood products discussed with: patient whom consented to blood products.

## 2022-12-28 NOTE — CARE PLAN
Problem: Pain - Standard  Goal: Alleviation of pain or a reduction in pain to the patient’s comfort goal  Outcome: Progressing   The patient is Stable - Low risk of patient condition declining or worsening    Shift Goals  Clinical Goals: Pain control  Patient Goals: Pain control    Progress made toward(s) clinical / shift goals:  Pt verbalizes pain control.    Patient is not progressing towards the following goals:

## 2022-12-28 NOTE — ANESTHESIA POSTPROCEDURE EVALUATION
Patient: April Alicia    Procedure Summary     Date: 12/28/22 Room / Location: [place] [address] / SURGERY [facility]    Anesthesia Start: 1309 Anesthesia Stop: 1549    Procedures:       REVISION, TOTAL ARTHROPLASTY, KNEE, ALL COMPONENTS-LEFT (Left: Knee)      IRRIGATION AND DEBRIDEMENT, WOUND (Left: Knee) Diagnosis: (Chronic infection)    Surgeons: Wild Luz M.D. Responsible Provider: Cade Orellana M.D.    Anesthesia Type: general ASA Status: 2          Final Anesthesia Type: general  Last vitals  BP   Blood Pressure: 119/70    Temp   36.7 °C (98 °F)    Pulse   71   Resp   18    SpO2   98 %      Anesthesia Post Evaluation    Patient location during evaluation: PACU  Patient participation: complete - patient participated  Level of consciousness: awake and alert  Pain score: 3    Airway patency: patent  Anesthetic complications: no  Cardiovascular status: hemodynamically stable  Respiratory status: acceptable  Hydration status: euvolemic    PONV: none          No notable events documented.     Nurse Pain Score: 9 (NPRS)

## 2022-12-28 NOTE — DISCHARGE PLANNING
Care Transition Team Assessment    Spoke to daughter Ava via phone, patient lives with her SO in 2nd floor apt, 1 flight stairs to get up. Ava lives about 10 mins away and able to help out as needed. Patient has been having great difficulty with getting down stairs due to increased knee pain. OR procedure scheduled for tomorrow, PT recs pending. Ava thinks patient might have [employer] and will check and let this CM know. CM will continue to monitor.     Information Source  Orientation Level: Oriented X4  Elopement Risk  Legal Hold: No  Elopement Risk: Not at Risk for Elopement    Interdisciplinary Discharge Planning  Lives with - Patient's Self Care Capacity: Significant Other  Patient or legal guardian wants to designate a caregiver: No  Support Systems: Children  Housing / Facility: 2 Story Apartment / Condo  Able to Return to Previous ADL's: Future Time w/Therapy  Mobility Issues: Yes  Prior Services: None  Durable Medical Equipment:  (fww, cane, 4wwm WC with elevating footrests, BSC.)  DME Provider / Phone: [facility]    Discharge Preparedness  What is your plan after discharge?: Skilled nursing facility  What are your discharge supports?: Child, Spouse  Prior Functional Level: Needs Assist with Activities of Daily Living  Difficulity with ADLs: Bathing, Walking    Functional Assesment  Prior Functional Level: Needs Assist with Activities of Daily Living  Vision / Hearing Impairment  Right Eye Vision: Impaired, Wears Glasses  Left Eye Vision: Impaired, Wears Glasses  Hearing Impairment: Both Ears, Hearing Device Not Available  Domestic Abuse  Have you ever been the victim of abuse or violence?: No  Anticipated Discharge Information  Discharge Disposition: D/T to SNF with Medicare cert in anticipation of skilled care (03)

## 2022-12-28 NOTE — CARE PLAN
Problem: Knowledge Deficit - Standard  Goal: Patient and family/care givers will demonstrate understanding of plan of care, disease process/condition, diagnostic tests and medications  Outcome: Progressing     Problem: Pain - Standard  Goal: Alleviation of pain or a reduction in pain to the patient’s comfort goal  Outcome: Progressing   The patient is Stable - Low risk of patient condition declining or worsening    Shift Goals  Clinical Goals: Procedure Performed  Patient Goals: Procedure Performed  Family Goals: N/A    Progress made toward(s) clinical / shift goals:  Procedure performed by physican this shift, educated patient on plan of care this shift     Patient is not progressing towards the following goals:

## 2022-12-28 NOTE — ANESTHESIA PROCEDURE NOTES
Airway    Date/Time: 12/28/2022 1:13 PM  Performed by: Cade Orellana M.D.  Authorized by: Cade Orellana M.D.     Location:  OR  Urgency:  Elective  Indications for Airway Management:  Anesthesia      Spontaneous Ventilation: absent    Sedation Level:  Deep  Preoxygenated: Yes    Patient Position:  Sniffing  Final Airway Type:  Endotracheal airway  Final Endotracheal Airway:  ETT  Cuffed: Yes    Technique Used for Successful ETT Placement:  Direct laryngoscopy    Insertion Site:  Oral  Blade Type:  Roni  Laryngoscope Blade/Videolaryngoscope Blade Size:  3  ETT Size (mm):  6.5  Measured from:  Teeth  ETT to Teeth (cm):  22  Placement Verified by: auscultation and capnometry    Cormack-Lehane Classification:  Grade I - full view of glottis  Number of Attempts at Approach:  1

## 2022-12-28 NOTE — OP REPORT
TKA Explant    Referring Provider: No ref. provider found     PCP:  Karen Mcclure P.A.-C.    Name:  April Alicia    MRN: [id number]    DATE OF SURGERY: 12/28/22    SURGEON: Wild Luz MD    PREOPERATIVE DIAGNOSIS:  left knee periprosthetic infection    POSTOPERATIVE DIAGNOSIS:  left knee periprosthetic infection    PROCEDURE(s) PERFORMED:  1.  left total knee arthroplasty explantation  2. left knee insertion of articulating spacer   3. left knee application of incisional wound vacuum     Assistants:  Circulator: Radha Sandoval R.N.; Rosetta Crespo R.N.  Limb Ramos: Marek Palomares  Scrub Person: Yaya Payne  1st assist:Poonam Gonzales, First assist student    Assistants were mandatory to provide adequate exposure for accurate implantation of prosthetic components, while protecting vital neurovascular structures    ANESTHESIOLOGIST:  Anesthesiologist: Cade Orellana M.D.    ANESTHESIA: General    ESTIMATED BLOOD LOSS: 300 cc    TOURNIQUET TIME:    Total Tourniquet Time Documented:  Leg Upper (Left) - 69 minutes  Total: Leg Upper (Left) - 69 minutes      ANTIBIOTICS:  from the floor + 2g ancef prior to incision    COMPLICATIONS: * No complications entered in OR log *    IMPLANTS:   6L CR  8 x 18 mm RP revision tibia insert    SPECIMENS: Synovial fluid, anterior synovium, lateral gutter tissue, medial gutter tissue, tibial and femoral canal tissue as well.    AORI CLASSIFICATION:  1.  Femoral: type 2B  2.  Tibial:  type 2B    PATELLAR THICKNESS:  15    FIXATION:  1.  Femoral:  loose  2.  Tibial:  loose  3.  Patella:  N/A      INDICATION FOR PROCEDURE: April Alicia is a 62 y.o.  female  who presented with a periprosthetic knee infection. Please refer back to previous notes regarding this. Risks and benefits of surgery were reviewed which included but were not limited to PE, DVT, infection, hardware failure or loosening, reoperation, the need for transfusion and death.  Alternatives to surgery had previously been reviewed with the patient as well.  The patient wished to proceed with the above procedure.    PROCEDURE IN DETAIL: The patient was met in the preoperative holding area. Informed consent was previously obtained in clinic and we reviewed this again. Appropriate site was marked. The patient was taken to the operative theater where the above anesthesia was performed without complication.  Abundant soft roll and a tourniquet were placed high on the thigh of the operative extremity.  The operative lower extremity was then prepped and draped in normal sterile fashion. A formal time-out was performed, identifying correct patient, correct site, and correct procedure.  The patient antibiotics received as above before incision.     At that point, an incision was made through the prior incision down through subcutaneous tissue to fascia. The knee was aspirated and this fluid was sent for culture. The knee was exposed with a medial parapatellar arthrotomy.   Purulence was noted in the joint. I then performed reconstitution of the medial and lateral gutters. This tissue was sent for culture. I did an anterior synovectomy and this tissue was sent for culture as well.    I then remove the polyethylene component without complication. Attention was then turned to the femur. With a combination of a saw and osteotomes I was able to remove the femoral component. The femoral component fixation was as noted above.  Femoral bone loss was noted and classified as noted above.  I freshened my femoral cuts in all planes. I meticulously removed cement as well. I sent tissue culture from the femoral canal.    I then performed a posterior capsulectomy.  My attention was then turned to the tibia. With a combination of a saw and osteotomes I was able to remove the tibia. The  tibial component fixation was as noted above.  Tibial bone loss was noted and classified as stated above.  I freshened my tibial  cut. I meticulously removed all cement as well. I did send tissue culture from the tibial canal.    At that point I then irrigated the wound with approximately 6 L of fluid. I then changed my gloves as well as placed a new split sheet over the surgical field area I then placed my trial femoral and tibial component for fit.  I did leave these components and a little looser than I typically do so that we can have adequate scarring and will become back for the revision surgery this will allow more options on both the tibial and femoral side. I removed these components and I soaked the wound and both the tibial and femoral canal and the wound with a Betadine solution. I then placed the trial components once again. I closed the skin layer with a running 2-0 nylon.    I then removed all instruments from the field. I also reprepped and draped the patient and had new clean instruments. My whole team regowned and draped as well. I then removed the trial components once the wound was reopened. I then copiously irrigated the wound with another 3 L of fluid. I then cemented my femoral and tibial components in place and allowed the cement to cure in extension.  The arthrotomy was closed with #1 Monocryl, subcutaneous tissue closed with 2-0 Monocryl, and I used staples for the skin.  A sterile dressing was placed at the end of the case consisting of a wound vac.  All counts were correct at the end of the case. I was present for all critical portions of the case. The patient was awoken from anesthesia and seemed to tolerate procedure well.     DISPOSTION: Stable to the PACU.  The patient will be weight bearing as tolerated. The patient will be in a hinged knee brace. Anticipated length of hospitalization will be several nights and this will depend on PICC line placement as well as antibiotic treatment and set up with home care.  The patient will have PT/OT while in-house.  Our internal medicine team will assist with medical  management.  DVT prophylaxis will begin POD #1.  Follow-up in my clinic has been set up for a wound check already.

## 2022-12-28 NOTE — CARE PLAN
Problem: Knowledge Deficit - Standard  Goal: Patient and family/care givers will demonstrate understanding of plan of care, disease process/condition, diagnostic tests and medications  Outcome: Progressing     Problem: Pain - Standard  Goal: Alleviation of pain or a reduction in pain to the patient’s comfort goal  Outcome: Progressing       The patient is Stable - Low risk of patient condition declining or worsening    Shift Goals  Clinical Goals: Pain control  Patient Goals: Pain control    Progress made toward(s) clinical / shift goals:  Taught pt 0-10 pain scale and non-pharmacological method of pain management, encouraged to verbalize when in pain. Administered PRN pain meds as needed.      Patient is not progressing towards the following goals:

## 2022-12-28 NOTE — ANESTHESIA PROCEDURE NOTES
Peripheral Block    Date/Time: 12/28/2022 3:32 PM  Performed by: Cade Orellana M.D.  Authorized by: Cade Orellana M.D.     Patient Location:  OR  Start Time:  12/28/2022 3:32 PM  Reason for Block: at surgeon's request and post-op pain management ONLY    patient identified, IV checked, site marked, risks and benefits discussed, surgical consent, monitors and equipment checked, pre-op evaluation and timeout performed    Patient Position:  Supine  Prep: ChloraPrep    Monitoring:  Heart rate, continuous pulse ox and cardiac monitor  Block Region:  Lower Extremity  Lower Extremity - Block Type:  Selective FEMORAL nerve block at the Adductor Canal    Laterality:  Left  Procedures: ultrasound guided  Image captured, interpreted and electronically stored.  Local Infiltration:  Lidocaine  Strength:  1 %  Dose:  3 ml  Block Type:  Single-shot  Needle Length:  100mm  Needle Gauge:  21 G  Needle Localization:  Ultrasound guidance  Injection Assessment:  Negative aspiration for heme, no paresthesia on injection, incremental injection and local visualized surrounding nerve on ultrasound

## 2022-12-29 ENCOUNTER — APPOINTMENT (OUTPATIENT)
Dept: RADIOLOGY | Facility: MEDICAL CENTER | Age: 62
DRG: 486 | End: 2022-12-29
Attending: INTERNAL MEDICINE
Payer: MEDICAID

## 2022-12-29 LAB
ALBUMIN SERPL BCP-MCNC: 2.1 G/DL (ref 3.2–4.9)
ALBUMIN/GLOB SERPL: 0.8 G/DL
ALP SERPL-CCNC: 118 U/L (ref 30–99)
ALT SERPL-CCNC: 12 U/L (ref 2–50)
ANION GAP SERPL CALC-SCNC: 7 MMOL/L (ref 7–16)
AST SERPL-CCNC: 19 U/L (ref 12–45)
BILIRUB SERPL-MCNC: 0.5 MG/DL (ref 0.1–1.5)
BUN SERPL-MCNC: 27 MG/DL (ref 8–22)
CALCIUM ALBUM COR SERPL-MCNC: 8.8 MG/DL (ref 8.5–10.5)
CALCIUM SERPL-MCNC: 7.3 MG/DL (ref 8.5–10.5)
CHLORIDE SERPL-SCNC: 106 MMOL/L (ref 96–112)
CO2 SERPL-SCNC: 18 MMOL/L (ref 20–33)
CREAT SERPL-MCNC: 1.12 MG/DL (ref 0.5–1.4)
ERYTHROCYTE [DISTWIDTH] IN BLOOD BY AUTOMATED COUNT: 56 FL (ref 35.9–50)
GFR SERPLBLD CREATININE-BSD FMLA CKD-EPI: 55 ML/MIN/1.73 M 2
GLOBULIN SER CALC-MCNC: 2.7 G/DL (ref 1.9–3.5)
GLUCOSE BLD STRIP.AUTO-MCNC: 118 MG/DL (ref 65–99)
GLUCOSE BLD STRIP.AUTO-MCNC: 143 MG/DL (ref 65–99)
GLUCOSE BLD STRIP.AUTO-MCNC: 148 MG/DL (ref 65–99)
GLUCOSE BLD STRIP.AUTO-MCNC: 159 MG/DL (ref 65–99)
GLUCOSE SERPL-MCNC: 118 MG/DL (ref 65–99)
HCT VFR BLD AUTO: 22.2 % (ref 37–47)
HGB BLD-MCNC: 7.1 G/DL (ref 12–16)
MCH RBC QN AUTO: 28.7 PG (ref 27–33)
MCHC RBC AUTO-ENTMCNC: 32 G/DL (ref 33.6–35)
MCV RBC AUTO: 89.9 FL (ref 81.4–97.8)
PLATELET # BLD AUTO: 69 K/UL (ref 164–446)
PMV BLD AUTO: 10.2 FL (ref 9–12.9)
POTASSIUM SERPL-SCNC: 4.2 MMOL/L (ref 3.6–5.5)
PROT SERPL-MCNC: 4.8 G/DL (ref 6–8.2)
RBC # BLD AUTO: 2.47 M/UL (ref 4.2–5.4)
SODIUM SERPL-SCNC: 131 MMOL/L (ref 135–145)
WBC # BLD AUTO: 6.9 K/UL (ref 4.8–10.8)
ZINC SERPL-MCNC: 22 UG/DL (ref 60–120)

## 2022-12-29 PROCEDURE — 36415 COLL VENOUS BLD VENIPUNCTURE: CPT

## 2022-12-29 PROCEDURE — 770001 HCHG ROOM/CARE - MED/SURG/GYN PRIV*

## 2022-12-29 PROCEDURE — 97162 PT EVAL MOD COMPLEX 30 MIN: CPT

## 2022-12-29 PROCEDURE — A9270 NON-COVERED ITEM OR SERVICE: HCPCS | Performed by: INTERNAL MEDICINE

## 2022-12-29 PROCEDURE — 700102 HCHG RX REV CODE 250 W/ 637 OVERRIDE(OP): Performed by: INTERNAL MEDICINE

## 2022-12-29 PROCEDURE — 700102 HCHG RX REV CODE 250 W/ 637 OVERRIDE(OP)

## 2022-12-29 PROCEDURE — A9270 NON-COVERED ITEM OR SERVICE: HCPCS | Performed by: STUDENT IN AN ORGANIZED HEALTH CARE EDUCATION/TRAINING PROGRAM

## 2022-12-29 PROCEDURE — 82962 GLUCOSE BLOOD TEST: CPT | Mod: 91

## 2022-12-29 PROCEDURE — 700102 HCHG RX REV CODE 250 W/ 637 OVERRIDE(OP): Performed by: STUDENT IN AN ORGANIZED HEALTH CARE EDUCATION/TRAINING PROGRAM

## 2022-12-29 PROCEDURE — 85027 COMPLETE CBC AUTOMATED: CPT

## 2022-12-29 PROCEDURE — 700111 HCHG RX REV CODE 636 W/ 250 OVERRIDE (IP): Performed by: INTERNAL MEDICINE

## 2022-12-29 PROCEDURE — 80053 COMPREHEN METABOLIC PANEL: CPT

## 2022-12-29 PROCEDURE — 99233 SBSQ HOSP IP/OBS HIGH 50: CPT | Performed by: INTERNAL MEDICINE

## 2022-12-29 PROCEDURE — A9270 NON-COVERED ITEM OR SERVICE: HCPCS

## 2022-12-29 PROCEDURE — 97167 OT EVAL HIGH COMPLEX 60 MIN: CPT

## 2022-12-29 RX ORDER — IPRATROPIUM BROMIDE AND ALBUTEROL SULFATE 2.5; .5 MG/3ML; MG/3ML
3 SOLUTION RESPIRATORY (INHALATION)
Status: DISCONTINUED | OUTPATIENT
Start: 2022-12-29 | End: 2022-12-29

## 2022-12-29 RX ORDER — ALBUTEROL SULFATE 90 UG/1
2 AEROSOL, METERED RESPIRATORY (INHALATION)
Status: DISCONTINUED | OUTPATIENT
Start: 2022-12-29 | End: 2022-12-29

## 2022-12-29 RX ORDER — ALBUTEROL SULFATE 90 UG/1
2 AEROSOL, METERED RESPIRATORY (INHALATION) EVERY 4 HOURS PRN
Status: DISCONTINUED | OUTPATIENT
Start: 2022-12-29 | End: 2023-01-10 | Stop reason: HOSPADM

## 2022-12-29 RX ORDER — ALBUTEROL SULFATE 90 UG/1
2 AEROSOL, METERED RESPIRATORY (INHALATION) EVERY 4 HOURS
Status: DISCONTINUED | OUTPATIENT
Start: 2022-12-29 | End: 2022-12-29

## 2022-12-29 RX ORDER — ALBUTEROL SULFATE 90 UG/1
2 AEROSOL, METERED RESPIRATORY (INHALATION) EVERY 4 HOURS PRN
Status: DISCONTINUED | OUTPATIENT
Start: 2022-12-29 | End: 2022-12-29

## 2022-12-29 RX ADMIN — ATORVASTATIN CALCIUM 20 MG: 20 TABLET, FILM COATED ORAL at 20:35

## 2022-12-29 RX ADMIN — DRONEDARONE 400 MG: 400 TABLET, FILM COATED ORAL at 09:04

## 2022-12-29 RX ADMIN — DOCUSATE SODIUM 50 MG AND SENNOSIDES 8.6 MG 2 TABLET: 8.6; 5 TABLET, FILM COATED ORAL at 17:41

## 2022-12-29 RX ADMIN — CEFTRIAXONE SODIUM 2000 MG: 10 INJECTION, POWDER, FOR SOLUTION INTRAVENOUS at 05:42

## 2022-12-29 RX ADMIN — OXYCODONE HYDROCHLORIDE 10 MG: 10 TABLET ORAL at 20:41

## 2022-12-29 RX ADMIN — Medication 220 MG: at 05:41

## 2022-12-29 RX ADMIN — ACETAMINOPHEN 650 MG: 325 TABLET ORAL at 06:00

## 2022-12-29 RX ADMIN — DRONEDARONE 400 MG: 400 TABLET, FILM COATED ORAL at 17:39

## 2022-12-29 RX ADMIN — OMEPRAZOLE 20 MG: 20 CAPSULE, DELAYED RELEASE ORAL at 05:42

## 2022-12-29 RX ADMIN — INSULIN HUMAN 2 UNITS: 100 INJECTION, SOLUTION PARENTERAL at 20:24

## 2022-12-29 RX ADMIN — GABAPENTIN 200 MG: 100 CAPSULE ORAL at 17:40

## 2022-12-29 RX ADMIN — OXYCODONE 5 MG: 5 TABLET ORAL at 17:40

## 2022-12-29 RX ADMIN — OXYCODONE HYDROCHLORIDE 10 MG: 10 TABLET ORAL at 00:08

## 2022-12-29 RX ADMIN — OXYCODONE HYDROCHLORIDE 10 MG: 10 TABLET ORAL at 09:04

## 2022-12-29 RX ADMIN — NICOTINE 14 MG: 14 PATCH TRANSDERMAL at 05:42

## 2022-12-29 RX ADMIN — OMEPRAZOLE 20 MG: 20 CAPSULE, DELAYED RELEASE ORAL at 17:40

## 2022-12-29 RX ADMIN — GABAPENTIN 200 MG: 100 CAPSULE ORAL at 05:42

## 2022-12-29 ASSESSMENT — ENCOUNTER SYMPTOMS
CONSTIPATION: 0
DIZZINESS: 0
EYE REDNESS: 0
FEVER: 1
NAUSEA: 0
SHORTNESS OF BREATH: 0
VOMITING: 0

## 2022-12-29 ASSESSMENT — COGNITIVE AND FUNCTIONAL STATUS - GENERAL
HELP NEEDED FOR BATHING: TOTAL
CLIMB 3 TO 5 STEPS WITH RAILING: TOTAL
DRESSING REGULAR UPPER BODY CLOTHING: A LOT
WALKING IN HOSPITAL ROOM: TOTAL
DRESSING REGULAR UPPER BODY CLOTHING: A LITTLE
STANDING UP FROM CHAIR USING ARMS: TOTAL
TOILETING: TOTAL
WALKING IN HOSPITAL ROOM: TOTAL
DRESSING REGULAR LOWER BODY CLOTHING: A LOT
DAILY ACTIVITIY SCORE: 18
DRESSING REGULAR LOWER BODY CLOTHING: TOTAL
TOILETING: A LITTLE
MOVING TO AND FROM BED TO CHAIR: A LITTLE
SUGGESTED CMS G CODE MODIFIER MOBILITY: CL
SUGGESTED CMS G CODE MODIFIER DAILY ACTIVITY: CK
PERSONAL GROOMING: A LITTLE
TURNING FROM BACK TO SIDE WHILE IN FLAT BAD: UNABLE
CLIMB 3 TO 5 STEPS WITH RAILING: TOTAL
MOBILITY SCORE: 10
HELP NEEDED FOR BATHING: A LOT
SUGGESTED CMS G CODE MODIFIER DAILY ACTIVITY: CL
DAILY ACTIVITIY SCORE: 12
SUGGESTED CMS G CODE MODIFIER MOBILITY: CM
MOVING FROM LYING ON BACK TO SITTING ON SIDE OF FLAT BED: UNABLE
STANDING UP FROM CHAIR USING ARMS: A LOT
MOVING TO AND FROM BED TO CHAIR: UNABLE
MOBILITY SCORE: 7
MOVING FROM LYING ON BACK TO SITTING ON SIDE OF FLAT BED: UNABLE
TURNING FROM BACK TO SIDE WHILE IN FLAT BAD: A LITTLE

## 2022-12-29 ASSESSMENT — PAIN DESCRIPTION - PAIN TYPE
TYPE: ACUTE PAIN
TYPE: ACUTE PAIN;SURGICAL PAIN
TYPE: ACUTE PAIN
TYPE: SURGICAL PAIN
TYPE: ACUTE PAIN

## 2022-12-29 ASSESSMENT — ACTIVITIES OF DAILY LIVING (ADL): TOILETING: INDEPENDENT

## 2022-12-29 ASSESSMENT — GAIT ASSESSMENTS
GAIT LEVEL OF ASSIST: MAXIMAL ASSIST
ASSISTIVE DEVICE: NONE;HAND HELD ASSIST

## 2022-12-29 ASSESSMENT — LIFESTYLE VARIABLES: SUBSTANCE_ABUSE: 0

## 2022-12-29 NOTE — ASSESSMENT & PLAN NOTE
S/p PRBC transfusion x 1.  Iron studies consistent with iron deficiency, continue IV iron   -- hemoglobin at 7.4  If less than 7, will transfuse

## 2022-12-29 NOTE — THERAPY
Physical Therapy   Initial Evaluation     Patient Name: April Alicia  Age:  62 y.o., Sex:  female  Medical Record #: [id number]  Today's Date: 12/29/2022     Precautions  Precautions: Weight Bearing As Tolerated Left Lower Extremity;Immobilizer Left Lower Extremity    Assessment  Patient is 62 y.o. female w/ hx of ETOH cirrhosis, hepatic encephalopathy.  Admitted w/ fever and increased left knee pain/infected left knee hardware.  S/p lefet knee aspiration and TKA w/ spacer, WBAT, in a hinged knee brace locked in full extension.  She reports living w/ her SO and adult daughter in a single story house w/ two steps to ener.  She owns a cane, fww and w/c.  Today, she needed significant assist for all mobility.  Max assist x2 to sit eob, stand and take two side steps.  She has poor insight, stating that she will have her transport people pick her up and take her to out patient therapy.  PT will follow and address impairments noted below.  Plan    Physical Therapy Initial Treatment Plan   Treatment Plan : Bed Mobility, Gait Training, Therapeutic Activities  Treatment Frequency: 3 Times per Week  Duration: Until Therapy Goals Met    DC Equipment Recommendations: Unable to determine at this time  Discharge Recommendations: Recommend post-acute placement for additional physical therapy services prior to discharge home         Objective       12/29/22 1111   Prior Living Situation   Housing / Facility 1 Story House   Steps Into Home 2   Steps In Home 0   Rail None   Equipment Owned Front-Wheel Walker;Single Point Cane;Wheelchair   Lives with - Patient's Self Care Capacity Significant Other;Adult Children   Prior Level of Functional Mobility   Bed Mobility Independent   Transfer Status Independent   Ambulation Independent   Assistive Devices Used Front-Wheel Walker   Stairs Independent   Cognition    Level of Consciousness Alert   Strength Upper Body   Comments   (grossly 3+/5)   Strength Lower Body   Comments NT due  to pain   Balance Assessment   Sitting Balance (Static) Fair   Sitting Balance (Dynamic) Fair   Standing Balance (Static) Trace +   Standing Balance (Dynamic) Trace +   Weight Shift Sitting Poor   Weight Shift Standing Poor   Bed Mobility    Supine to Sit Maximal Assist   Sit to Supine Maximal Assist   Scooting Total Assist   Comments max x2   Gait Analysis   Gait Level Of Assist Maximal Assist   Assistive Device None;Hand Held Assist   Distance (Feet)   (two steps left)   Weight Bearing Status WBAT LLE   Comments max x2   Functional Mobility   Sit to Stand Maximal Assist   Bed, Chair, Wheelchair Transfer Unable to Participate   Comments max x2   Short Term Goals    Short Term Goal # 1 Pt to move supine to/from eob w/ spv in 6 visits to improve fxl indep   Short Term Goal # 2 Pt to move sit to/from stand w/ spv in 6 visits to improve fxl indep   Short Term Goal # 3 Pt to ambulate 75 ft w/ fww and spv in 6 visits to improve fxl indep   Physical Therapy Initial Treatment Plan    Treatment Plan  Bed Mobility;Gait Training;Therapeutic Activities   Treatment Frequency 3 Times per Week   Duration Until Therapy Goals Met   Problem List    Problems Pain;Impaired Bed Mobility;Impaired Transfers;Impaired Ambulation;Functional Strength Deficit;Decreased Activity Tolerance   Anticipated Discharge Equipment and Recommendations   DC Equipment Recommendations Unable to determine at this time   Discharge Recommendations Recommend post-acute placement for additional physical therapy services prior to discharge home

## 2022-12-29 NOTE — PROGRESS NOTES
Hospital Medicine Daily Progress Note    Date of Service  12/29/2022    Chief Complaint  April Alicia is a 62 y.o. female admitted 12/26/2022 with left knee pain    Hospital Course  Mrs. Alicia is our 62 year old female patient with a past medical history significant for cocaine and ETOH abuse, cirrhosis, asthma, atrial fibrillation on apixaban, diabetes mellitus, hypertension who presents to the hospital with chief complaint of increased pain in her LEFT knee associated with fevers and chills that began today. Patient describes difficulty ambulating even short distances due to the pain which is atypical for her. Denies any nausea, vomiting, tremors, falls, loss of consciousness, or recent trauma to the knee. Patient is not a reliable historian; tangential when answering to questions. History procurement substantiated by thorough chart review.     Patient with history of bilateral knee surgeries. In 7/22, was found to have group B streptococcal bacteremia secondary to infection of tibial component of LEFT arthroplasty hardware, at which point orthopedics was consulted, arthrocentesis performed which confirmed infection, underwent LEFT knee revision, total arthroplasty with antibiotic spacer placed. Plan was then to continue with CTX daily through 8/30/22. Followed with Dr. Gray (of [facility]).     Patient endorses she has not drank alcohol for weeks, and denies recent cocaine use.    In the ER the patient underwent arthrocentesis of the left knee which showed 21,150 WBCs, 86% PMNs.  Culture grew group B strep.  Orthopedics and ID were consulted. She went to surgery with Dr. Luz on 12/28/22. She had symptoms of UTI and ucx grew E. Coli.  She was started on unasyn/vanc then switched to ceftriaxone.     Interval Problem Update  - febrile overnight 38.2  - POD#1 s/p I&P L knee, cultures pending  - reports L knee pain and b/l shoulder pain  - having BMs  - hemoglobin dropped 10-->7  -  BP soft today 70s-90s      I have discussed this patient's plan of care and discharge plan at IDT rounds today with Case Management, Nursing, Nursing leadership, and other members of the IDT team.    Consultants/Specialty  infectious disease and orthopedics    Code Status  Full Code    Disposition  Patient is not medically cleared for discharge.   Anticipate discharge to  TBD .  I have placed the appropriate orders for post-discharge needs.    Review of Systems  Review of Systems   Constitutional:  Positive for fever and malaise/fatigue.   HENT:  Positive for hearing loss.    Eyes:  Negative for redness.   Respiratory:  Negative for shortness of breath.    Cardiovascular:  Positive for leg swelling. Negative for chest pain.   Gastrointestinal:  Negative for constipation, nausea and vomiting.   Genitourinary:  Positive for dysuria.   Musculoskeletal:  Positive for joint pain.   Skin: Negative.    Neurological:  Negative for dizziness.   Psychiatric/Behavioral:  Negative for substance abuse.       Physical Exam  Temp:  [36.1 °C (97 °F)-38.2 °C (100.8 °F)] 37.3 °C (99.1 °F)  Pulse:  [61-82] 66  Resp:  [13-18] 15  BP: ()/(45-83) 98/57  SpO2:  [91 %-100 %] 91 %    Physical Exam  Constitutional:       General: She is not in acute distress.     Appearance: She is not toxic-appearing or diaphoretic.   HENT:      Head: Normocephalic and atraumatic.      Nose: Nose normal.      Mouth/Throat:      Mouth: Mucous membranes are moist.   Eyes:      General: No scleral icterus.     Conjunctiva/sclera: Conjunctivae normal.   Cardiovascular:      Rate and Rhythm: Normal rate and regular rhythm.      Heart sounds: Murmur heard.     No friction rub. No gallop.   Pulmonary:      Effort: Pulmonary effort is normal.      Breath sounds: Normal breath sounds.   Abdominal:      General: Bowel sounds are normal. There is no distension.      Palpations: Abdomen is soft.      Tenderness: There is no abdominal tenderness.    Musculoskeletal:      Cervical back: Normal range of motion.      Left knee: Tenderness present.      Right lower leg: Edema present.      Left lower leg: Edema present.      Comments: L knee wrapped, drain in place   Skin:     Findings: No erythema.      Comments: Skin graft scars b/l LEs   Neurological:      Mental Status: She is alert and oriented to person, place, and time. Mental status is at baseline.   Psychiatric:         Mood and Affect: Mood normal.         Behavior: Behavior normal.         Thought Content: Thought content normal.       Fluids    Intake/Output Summary (Last 24 hours) at 12/29/2022 0916  Last data filed at 12/28/2022 1541  Gross per 24 hour   Intake 1500 ml   Output 250 ml   Net 1250 ml         Laboratory  Recent Labs     12/27/22  0316 12/28/22  0747 12/29/22  0818   WBC 7.1 8.3 6.9   RBC 3.33* 3.43* 2.47*   HEMOGLOBIN 9.8* 10.0* 7.1*   HEMATOCRIT 29.8* 31.7* 22.2*   MCV 89.5 92.4 89.9   MCH 29.4 29.2 28.7   MCHC 32.9* 31.5* 32.0*   RDW 57.8* 59.4* 56.0*   PLATELETCT 59* 82* 69*   MPV 10.7 11.7 10.2       Recent Labs     12/27/22 0316 12/28/22  0747 12/29/22  0818   SODIUM 130* 138 131*   POTASSIUM 4.1 3.9 4.2   CHLORIDE 103 113* 106   CO2 17* 16* 18*   GLUCOSE 114* 114* 118*   BUN 24* 22 27*   CREATININE 0.69 0.72 1.12   CALCIUM 8.1* 7.8* 7.3*                     Imaging  DX-KNEE 2- LEFT   Final Result      Antibiotic impregnated radiopaque beads within and about the knee arthroplasty.      DX-SHOULDER 2+ RIGHT   Final Result      1.  Moderately advanced acromioclavicular degenerative changes.   2.  Acromiohumeral narrowing suggesting chronic rotator cuff tears.   3.  No acute fracture or dislocation.      DX-CHEST-PORTABLE (1 VIEW)   Final Result      1.  No acute cardiopulmonary abnormality.   2.  Prior granulomatous exposure.         DX-KNEE 3 VIEWS LEFT   Final Result      1.  Revised left knee arthroplasty is unchanged since prior. No evidence of hardware complication.   2.  No  acute fracture or dislocation.   3.  Marked soft tissue swelling and suspected knee effusion.      MR-SHOULDER-W/O LEFT    (Results Pending)          Assessment/Plan  * Septic joint (HCC)- (present on admission)  Assessment & Plan  Patient has a history of group B strep bacteremia secondary to infection of tibial component of left arthroplasty hardware  Also with septic L shoulder  Status post antibiotic spacer placement by orthopedics (Dr. Luz)  Comes in with left knee pain and swelling and L shoulder pain  Arthrocentesis in the ER concerning for septic knee  Synovial cultures growing group B strep  Orthopedics consulted: I&D 12/28, cultures pending  Infectious disease consulted    Pain mgmt  PT/OT  MRI Shoulder    Metabolic acidosis  Assessment & Plan  Normal AG, likely in setting of multiple BMs from lactulose  Improved  DC IVF    Acute encephalopathy  Assessment & Plan  Likely in setting of infection +/- hepatic encephalopathy  Ammonia normal  Treat infection  Decreased gabapentin  Improved today, thinking more clearly  Having multiple BMs    GERD (gastroesophageal reflux disease)  Assessment & Plan  Continue home omeprazole    Type 2 diabetes mellitus (HCC)  Assessment & Plan  Diet controlled  SSI    Anemia of chronic disease  Assessment & Plan  H/H dropped 10-->7  Soft BPs today  Recheck today or tomorrow am  Transfuse if hemoglobin < 7    Acute cystitis without hematuria- (present on admission)  Assessment & Plan  Patient c/o pain with urination and lower abd discomfort  UA c/f infection  ucx growing e.coli resistant to PCN  Switched from unasyn to CTX    Atrial fibrillation (HCC)- (present on admission)  Assessment & Plan  Continue home dronedarone 400 mg twice daily  Holding Eliquis    Cirrhosis (HCC)- (present on admission)  Assessment & Plan  In setting of alcohol use however no EtOH in over a year  Continue home lactulose    Hypertension- (present on admission)  Assessment & Plan  Home regimen:  Losartan 100 mg daily, felodipine 5 mg q. evening  Inpatient regimen: continue home meds  BP soft today, recheck H/H, holding losartan (also Cr bumped slightly)    Thrombocytopenia (HCC)- (present on admission)  Assessment & Plan  Likely in setting of cirrhosis  Holding Eliquis  Platelets stable  Continue to monitor           VTE prophylaxis: SCDs/TEDs    I have performed a physical exam and reviewed and updated ROS and Plan today (12/29/2022). In review of yesterday's note (12/28/2022), there are no changes except as documented above.

## 2022-12-29 NOTE — CARE PLAN
The patient is Stable - Low risk of patient condition declining or worsening    Shift Goals  Clinical Goals: Pain control  Patient Goals: Pain control  Family Goals: N/A    Progress made toward(s) clinical / shift goals:  Pain controlled, using call light    Patient is not progressing towards the following goals:      Problem: Fall Risk  Goal: Patient will remain free from falls  Outcome: Progressing  Note: Educated about using the call light, bed locked, in lowest position, treaded socks in place, weight bearing precautions     Problem: Pain - Standard  Goal: Alleviation of pain or a reduction in pain to the patient’s comfort goal  Outcome: Progressing  Note: Educated about the pain scale and non pharmacological pain methods, check the MAR

## 2022-12-29 NOTE — CARE PLAN
Problem: Knowledge Deficit - Standard  Goal: Patient and family/care givers will demonstrate understanding of plan of care, disease process/condition, diagnostic tests and medications  Outcome: Progressing     Problem: Pain - Standard  Goal: Alleviation of pain or a reduction in pain to the patient’s comfort goal  Outcome: Progressing       The patient is Watcher - Medium risk of patient condition declining or worsening    Shift Goals  Clinical Goals: Pain control  Patient Goals: Pain control  Family Goals: N/A    Progress made toward(s) clinical / shift goals:  Taught pt 0-10 pain scale and non-pharmacological method of pain management, encouraged to verbalize when in pain. Administered PRN pain meds as needed.      Patient is not progressing towards the following goals:

## 2022-12-29 NOTE — PROGRESS NOTES
4 Eyes Skin Assessment Completed by Evan RN and DANYEL Law.    Head WDL  Ears WDL  Nose WDL  Mouth WDL  Neck WDL  Breast/Chest WDL  Shoulder Blades WDL  Spine WDL  (R) Arm/Elbow/Hand WDL  (L) Arm/Elbow/Hand WDL  Abdomen WDL  Groin WDL  Scrotum/Coccyx/Buttocks WDL  (R) Leg WDL  (L) Leg Surgical Incision Site Covered by Ace Wrap, Immobilizer Boot in place    (R) Heel/Foot/Toe WDL  (L) Heel/Foot/Toe WDL          Devices In Places Blood Pressure Cuff, Pulse Ox, SCD's, and Nasal Cannula, Immobilizer Boot, Prevena [place] to LLE       Interventions In Place Gray Ear Foams, Heels Loaded W/Pillows, and Pressure Redistribution Mattress    Possible Skin Injury No    Pictures Uploaded Into Epic N/A  Wound Consult Placed N/A  RN Wound Prevention Protocol Ordered No

## 2022-12-30 ENCOUNTER — APPOINTMENT (OUTPATIENT)
Dept: RADIOLOGY | Facility: MEDICAL CENTER | Age: 62
DRG: 486 | End: 2022-12-30
Attending: INTERNAL MEDICINE
Payer: MEDICAID

## 2022-12-30 LAB
ANION GAP SERPL CALC-SCNC: 8 MMOL/L (ref 7–16)
BACTERIA TISS AEROBE CULT: ABNORMAL
BUN SERPL-MCNC: 29 MG/DL (ref 8–22)
CALCIUM SERPL-MCNC: 7.7 MG/DL (ref 8.5–10.5)
CHLORIDE SERPL-SCNC: 106 MMOL/L (ref 96–112)
CO2 SERPL-SCNC: 18 MMOL/L (ref 20–33)
CREAT SERPL-MCNC: 1.23 MG/DL (ref 0.5–1.4)
ERYTHROCYTE [DISTWIDTH] IN BLOOD BY AUTOMATED COUNT: 54.1 FL (ref 35.9–50)
GFR SERPLBLD CREATININE-BSD FMLA CKD-EPI: 49 ML/MIN/1.73 M 2
GLUCOSE BLD STRIP.AUTO-MCNC: 110 MG/DL (ref 65–99)
GLUCOSE BLD STRIP.AUTO-MCNC: 129 MG/DL (ref 65–99)
GLUCOSE BLD STRIP.AUTO-MCNC: 141 MG/DL (ref 65–99)
GLUCOSE BLD STRIP.AUTO-MCNC: 150 MG/DL (ref 65–99)
GLUCOSE SERPL-MCNC: 118 MG/DL (ref 65–99)
GRAM STN SPEC: ABNORMAL
HCT VFR BLD AUTO: 22.7 % (ref 37–47)
HGB BLD-MCNC: 7.3 G/DL (ref 12–16)
MCH RBC QN AUTO: 28.4 PG (ref 27–33)
MCHC RBC AUTO-ENTMCNC: 32.2 G/DL (ref 33.6–35)
MCV RBC AUTO: 88.3 FL (ref 81.4–97.8)
PLATELET # BLD AUTO: 85 K/UL (ref 164–446)
PMV BLD AUTO: 12 FL (ref 9–12.9)
POTASSIUM SERPL-SCNC: 4.4 MMOL/L (ref 3.6–5.5)
RBC # BLD AUTO: 2.57 M/UL (ref 4.2–5.4)
SIGNIFICANT IND 70042: ABNORMAL
SITE SITE: ABNORMAL
SODIUM SERPL-SCNC: 132 MMOL/L (ref 135–145)
SOURCE SOURCE: ABNORMAL
WBC # BLD AUTO: 6 K/UL (ref 4.8–10.8)

## 2022-12-30 PROCEDURE — A9270 NON-COVERED ITEM OR SERVICE: HCPCS | Performed by: STUDENT IN AN ORGANIZED HEALTH CARE EDUCATION/TRAINING PROGRAM

## 2022-12-30 PROCEDURE — 700111 HCHG RX REV CODE 636 W/ 250 OVERRIDE (IP): Performed by: INTERNAL MEDICINE

## 2022-12-30 PROCEDURE — 99233 SBSQ HOSP IP/OBS HIGH 50: CPT | Performed by: INTERNAL MEDICINE

## 2022-12-30 PROCEDURE — 700105 HCHG RX REV CODE 258: Performed by: INTERNAL MEDICINE

## 2022-12-30 PROCEDURE — A9270 NON-COVERED ITEM OR SERVICE: HCPCS | Performed by: INTERNAL MEDICINE

## 2022-12-30 PROCEDURE — 80048 BASIC METABOLIC PNL TOTAL CA: CPT

## 2022-12-30 PROCEDURE — 82962 GLUCOSE BLOOD TEST: CPT

## 2022-12-30 PROCEDURE — 700102 HCHG RX REV CODE 250 W/ 637 OVERRIDE(OP): Performed by: STUDENT IN AN ORGANIZED HEALTH CARE EDUCATION/TRAINING PROGRAM

## 2022-12-30 PROCEDURE — 700102 HCHG RX REV CODE 250 W/ 637 OVERRIDE(OP): Performed by: INTERNAL MEDICINE

## 2022-12-30 PROCEDURE — 85027 COMPLETE CBC AUTOMATED: CPT

## 2022-12-30 PROCEDURE — 99232 SBSQ HOSP IP/OBS MODERATE 35: CPT | Performed by: INTERNAL MEDICINE

## 2022-12-30 PROCEDURE — 770001 HCHG ROOM/CARE - MED/SURG/GYN PRIV*

## 2022-12-30 PROCEDURE — 36415 COLL VENOUS BLD VENIPUNCTURE: CPT

## 2022-12-30 RX ORDER — SODIUM CHLORIDE, SODIUM LACTATE, POTASSIUM CHLORIDE, AND CALCIUM CHLORIDE .6; .31; .03; .02 G/100ML; G/100ML; G/100ML; G/100ML
500 INJECTION, SOLUTION INTRAVENOUS ONCE
Status: COMPLETED | OUTPATIENT
Start: 2022-12-30 | End: 2022-12-30

## 2022-12-30 RX ORDER — ONDANSETRON 2 MG/ML
4 INJECTION INTRAMUSCULAR; INTRAVENOUS EVERY 4 HOURS PRN
Status: DISCONTINUED | OUTPATIENT
Start: 2022-12-30 | End: 2023-01-10 | Stop reason: HOSPADM

## 2022-12-30 RX ORDER — TRAMADOL HYDROCHLORIDE 50 MG/1
50 TABLET ORAL EVERY 6 HOURS PRN
Status: DISCONTINUED | OUTPATIENT
Start: 2022-12-30 | End: 2023-01-01

## 2022-12-30 RX ADMIN — CEFTRIAXONE SODIUM 2000 MG: 10 INJECTION, POWDER, FOR SOLUTION INTRAVENOUS at 05:31

## 2022-12-30 RX ADMIN — DOCUSATE SODIUM 50 MG AND SENNOSIDES 8.6 MG 2 TABLET: 8.6; 5 TABLET, FILM COATED ORAL at 05:33

## 2022-12-30 RX ADMIN — TRAMADOL HYDROCHLORIDE 50 MG: 50 TABLET, COATED ORAL at 23:33

## 2022-12-30 RX ADMIN — SODIUM CHLORIDE, POTASSIUM CHLORIDE, SODIUM LACTATE AND CALCIUM CHLORIDE 500 ML: 600; 310; 30; 20 INJECTION, SOLUTION INTRAVENOUS at 10:18

## 2022-12-30 RX ADMIN — ACETAMINOPHEN 650 MG: 325 TABLET ORAL at 05:32

## 2022-12-30 RX ADMIN — LACTULOSE 15 ML: 20 SOLUTION ORAL at 05:32

## 2022-12-30 RX ADMIN — ATORVASTATIN CALCIUM 20 MG: 20 TABLET, FILM COATED ORAL at 20:49

## 2022-12-30 RX ADMIN — OMEPRAZOLE 20 MG: 20 CAPSULE, DELAYED RELEASE ORAL at 05:33

## 2022-12-30 RX ADMIN — OMEPRAZOLE 20 MG: 20 CAPSULE, DELAYED RELEASE ORAL at 16:29

## 2022-12-30 RX ADMIN — OXYCODONE 5 MG: 5 TABLET ORAL at 05:32

## 2022-12-30 RX ADMIN — DRONEDARONE 400 MG: 400 TABLET, FILM COATED ORAL at 07:57

## 2022-12-30 RX ADMIN — Medication 220 MG: at 05:33

## 2022-12-30 RX ADMIN — TRAMADOL HYDROCHLORIDE 50 MG: 50 TABLET, COATED ORAL at 16:29

## 2022-12-30 RX ADMIN — GABAPENTIN 200 MG: 100 CAPSULE ORAL at 05:33

## 2022-12-30 RX ADMIN — OXYCODONE HYDROCHLORIDE 10 MG: 10 TABLET ORAL at 02:22

## 2022-12-30 RX ADMIN — DRONEDARONE 400 MG: 400 TABLET, FILM COATED ORAL at 16:29

## 2022-12-30 RX ADMIN — NICOTINE 14 MG: 14 PATCH TRANSDERMAL at 06:00

## 2022-12-30 RX ADMIN — ONDANSETRON 4 MG: 2 INJECTION INTRAMUSCULAR; INTRAVENOUS at 09:03

## 2022-12-30 RX ADMIN — GABAPENTIN 200 MG: 100 CAPSULE ORAL at 16:29

## 2022-12-30 RX ADMIN — ONDANSETRON 4 MG: 2 INJECTION INTRAMUSCULAR; INTRAVENOUS at 13:46

## 2022-12-30 ASSESSMENT — ENCOUNTER SYMPTOMS
VOMITING: 1
NAUSEA: 1
ABDOMINAL PAIN: 0
FEVER: 1
EYE REDNESS: 0
SHORTNESS OF BREATH: 0
CHILLS: 0
DIZZINESS: 0
CONSTIPATION: 0
NAUSEA: 0
DIARRHEA: 0
VOMITING: 0
DIARRHEA: 1
FEVER: 0

## 2022-12-30 ASSESSMENT — PAIN DESCRIPTION - PAIN TYPE
TYPE: ACUTE PAIN
TYPE: ACUTE PAIN

## 2022-12-30 ASSESSMENT — LIFESTYLE VARIABLES: SUBSTANCE_ABUSE: 0

## 2022-12-30 NOTE — PROGRESS NOTES
Infectious Disease Progress Note    Author: Carmelo Amezquita M.D. Date & Time of service: 2022  1:17 PM    Chief Complaint:  Follow-up for prosthetic knee joint infection    Interval History:   T-max 100.9, white count 6000, antibiotic switched to ceftriaxone due to complaining dysuria and positive urine culture for Unasyn intermediate E. coli.  OR cultures positive as below    Labs Reviewed and Medications Reviewed.    Review of Systems:  Review of Systems   Constitutional:  Negative for chills and fever.   Gastrointestinal:  Negative for abdominal pain, diarrhea, nausea and vomiting.   Musculoskeletal:  Positive for joint pain.   Skin:  Negative for itching and rash.   All other systems reviewed and are negative.    Hemodynamics:  Temp (24hrs), Av.8 °C (100 °F), Min:37.3 °C (99.1 °F), Max:38.3 °C (100.9 °F)  Temperature: 37.5 °C (99.5 °F)  Pulse  Av.3  Min: 61  Max: 83   Blood Pressure: 125/64       Physical Exam:  Physical Exam  Vitals and nursing note reviewed.   Constitutional:       General: She is not in acute distress.     Appearance: She is not ill-appearing.   HENT:      Mouth/Throat:      Pharynx: No oropharyngeal exudate.   Eyes:      General: No scleral icterus.        Right eye: No discharge.         Left eye: No discharge.      Conjunctiva/sclera: Conjunctivae normal.   Cardiovascular:      Rate and Rhythm: Normal rate.      Heart sounds: No murmur heard.  Pulmonary:      Effort: Pulmonary effort is normal. No respiratory distress.      Breath sounds: No stridor.   Abdominal:      General: Abdomen is flat. There is no distension.      Tenderness: There is no abdominal tenderness.   Musculoskeletal:         General: Swelling and tenderness present.      Comments:  Left shoulder with pain with motion but well-healed incision, no swelling or erythema or increased warmth.  Left knee with surgical dressing in place   Neurological:      General: No focal deficit present.      Mental  Status: She is alert and oriented to person, place, and time.   Psychiatric:         Mood and Affect: Mood normal.         Behavior: Behavior normal.       Meds:    Current Facility-Administered Medications:     ondansetron    traMADol    albuterol    cefTRIAXone (ROCEPHIN) IV    atorvastatin    [Held by provider] felodipine ER    [Held by provider] lactulose    [Held by provider] losartan    dronedarone    omeprazole    vitamin D2 (Ergocalciferol)    zinc sulfate    Pharmacy Consult Request    gabapentin    senna-docusate **AND** polyethylene glycol/lytes **AND** magnesium hydroxide **AND** bisacodyl    acetaminophen    nicotine **AND** Nicotine Replacement Patient Education Materials **AND** nicotine polacrilex    insulin regular **AND** POC blood glucose manual result **AND** NOTIFY MD and PharmD **AND** Administer 20 grams of glucose (approximately 8 ounces of fruit juice) every 15 minutes PRN FSBG less than 70 mg/dL **AND** dextrose bolus    Labs:  Recent Labs     12/28/22  0747 12/29/22  0818 12/30/22  0421   WBC 8.3 6.9 6.0   RBC 3.43* 2.47* 2.57*   HEMOGLOBIN 10.0* 7.1* 7.3*   HEMATOCRIT 31.7* 22.2* 22.7*   MCV 92.4 89.9 88.3   MCH 29.2 28.7 28.4   RDW 59.4* 56.0* 54.1*   PLATELETCT 82* 69* 85*   MPV 11.7 10.2 12.0   NEUTSPOLYS 89.50*  --   --    LYMPHOCYTES 6.10*  --   --    MONOCYTES 3.50  --   --    EOSINOPHILS 0.00  --   --    BASOPHILS 0.90  --   --    RBCMORPHOLO Present  --   --      Recent Labs     12/28/22  0747 12/29/22  0818 12/30/22  0421   SODIUM 138 131* 132*   POTASSIUM 3.9 4.2 4.4   CHLORIDE 113* 106 106   CO2 16* 18* 18*   GLUCOSE 114* 118* 118*   BUN 22 27* 29*     Recent Labs     12/28/22  0747 12/29/22  0818 12/30/22  0421   ALBUMIN  --  2.1*  --    TBILIRUBIN  --  0.5  --    ALKPHOSPHAT  --  118*  --    TOTPROTEIN  --  4.8*  --    ALTSGPT  --  12  --    ASTSGOT  --  19  --    CREATININE 0.72 1.12 1.23       Imaging:  DX-CHEST-PORTABLE (1 VIEW)    Result Date: 12/26/2022 12/26/2022 9:38  PM HISTORY/REASON FOR EXAM:  Sepsis. TECHNIQUE/EXAM DESCRIPTION AND NUMBER OF VIEWS: Single portable view of the chest. COMPARISON:  7/20/2022. FINDINGS: LUNGS: Clear. No effusions. PNEUMOTHORAX: None. LINES AND TUBES: None. MEDIASTINUM: No cardiomegaly. Atherosclerosis. Several mediastinal granulomas. MUSCULOSKELETAL STRUCTURES: No acute displaced fracture.     1.  No acute cardiopulmonary abnormality. 2.  Prior granulomatous exposure.     DX-KNEE 2- LEFT    Result Date: 12/28/2022 12/28/2022 4:07 PM HISTORY/REASON FOR EXAM: . Knee arthroplasty TECHNIQUE/EXAM DESCRIPTION AND NUMBER OF VIEWS:  2 views of the LEFT knee. COMPARISON: 2/26/2022 FINDINGS: Knee arthroplasty is again noted. Interval placement of radiopaque presumed antibiotic impregnated beads within and around the knee. There is soft tissue gas. Soft tissue swelling and presumed joint effusion is likely improved..     Antibiotic impregnated radiopaque beads within and about the knee arthroplasty.    DX-KNEE 3 VIEWS LEFT    Result Date: 12/26/2022 12/26/2022 9:38 PM HISTORY/REASON FOR EXAM:  Left knee pain TECHNIQUE/EXAM DESCRIPTION AND NUMBER OF VIEWS:  3 views of the LEFT knee. COMPARISON: 10/27/2022 FINDINGS: BONE MINERALIZATION: Normal. JOINTS: Suspected knee effusion. Revised left knee arthroplasty is unchanged. No evidence of hardware complication. FRACTURE: None. DISLOCATION: None. SOFT TISSUES: Marked soft tissue swelling. Atherosclerosis.     1.  Revised left knee arthroplasty is unchanged since prior. No evidence of hardware complication. 2.  No acute fracture or dislocation. 3.  Marked soft tissue swelling and suspected knee effusion.    DX-SHOULDER 2+ RIGHT    Result Date: 12/28/2022 12/28/2022 9:43 AM HISTORY/REASON FOR EXAM:  Pain/Deformity Following Trauma. TECHNIQUE/EXAM DESCRIPTION AND NUMBER OF VIEWS:  3 views of the RIGHT shoulder. COMPARISON: None FINDINGS: There is no fracture or dislocation. Narrowing of the acromiohumeral  interval. At least moderate degenerative changes of the acromioclavicular joint, mild of the glenohumeral joint. Bone mineralization is decreased..     1.  Moderately advanced acromioclavicular degenerative changes. 2.  Acromiohumeral narrowing suggesting chronic rotator cuff tears. 3.  No acute fracture or dislocation.      Micro:  Results       Procedure Component Value Units Date/Time    Anaerobic Culture [[id number]] Collected: 12/28/22 1351    Order Status: Completed Specimen: Tissue Updated: 12/30/22 1019     Significant Indicator NEG     Source TISS     Site Left anterior tibia     Culture Result Culture in progress.    Narrative:      Surgery Specimen    CULTURE TISSUE W/ GRM STAIN [[id number]]  (Abnormal) Collected: 12/28/22 1351    Order Status: Completed Specimen: Tissue Updated: 12/30/22 1019     Significant Indicator POS     Source TISS     Site Left anterior tibia     Culture Result -     Gram Stain Result Rare WBCs.  No organisms seen.       Culture Result Streptococcus agalactiae (Group B)  Light growth      Narrative:      Surgery Specimen    Anaerobic Culture [[id number]] Collected: 12/28/22 1350    Order Status: Completed Specimen: Tissue Updated: 12/30/22 1019     Significant Indicator NEG     Source TISS     Site Left knee lateral gutter     Culture Result Culture in progress.    Narrative:      Surgery Specimen    CULTURE TISSUE W/ GRM STAIN [[id number]]  (Abnormal) Collected: 12/28/22 1350    Order Status: Completed Specimen: Tissue Updated: 12/30/22 1019     Significant Indicator POS     Source TISS     Site Left knee lateral gutter     Culture Result -     Gram Stain Result Many WBCs.  No organisms seen.       Culture Result Streptococcus agalactiae (Group B)  Light growth      Narrative:      Surgery Specimen    CULTURE TISSUE W/ GRM STAIN [[id number]]  (Abnormal) Collected: 12/28/22 1432    Order Status: Completed Specimen: Tissue Updated: 12/30/22 1019     Significant Indicator POS     Source  TISS     Site Left femur     Culture Result -     Gram Stain Result Rare WBCs.  No organisms seen.       Culture Result Streptococcus agalactiae (Group B)  Light growth      Narrative:      Surgery Specimen    Anaerobic Culture [[id number]] Collected: 12/28/22 1432    Order Status: Completed Specimen: Tissue Updated: 12/30/22 1019     Significant Indicator NEG     Source TISS     Site Left femur     Culture Result Culture in progress.    Narrative:      Surgery Specimen    CULTURE TISSUE W/ GRM STAIN [[id number]]  (Abnormal) Collected: 12/28/22 1433    Order Status: Completed Specimen: Tissue Updated: 12/30/22 1018     Significant Indicator POS     Source TISS     Site Left Tibia     Culture Result -     Gram Stain Result Many WBCs.  No organisms seen.       Culture Result Streptococcus agalactiae (Group B)  Light growth      Narrative:      Surgery Specimen    Anaerobic Culture [[id number]] Collected: 12/28/22 1433    Order Status: Completed Specimen: Tissue Updated: 12/30/22 1018     Significant Indicator NEG     Source TISS     Site Left Tibia     Culture Result Culture in progress.    Narrative:      Surgery Specimen    CULTURE TISSUE W/ GRM STAIN [[id number]]  (Abnormal) Collected: 12/28/22 1349    Order Status: Completed Specimen: Tissue Updated: 12/30/22 1017     Significant Indicator POS     Source TISS     Site Left Knee Medial Gutter     Culture Result -     Gram Stain Result Rare WBCs.  No organisms seen.       Culture Result Streptococcus agalactiae (Group B)  Light growth      Narrative:      Surgery Specimen    Anaerobic Culture [[id number]] Collected: 12/28/22 1349    Order Status: Completed Specimen: Tissue Updated: 12/30/22 1017     Significant Indicator NEG     Source TISS     Site Left Knee Medial Gutter     Culture Result Culture in progress.    Narrative:      Surgery Specimen    GRAM STAIN [[id number]] Collected: 12/28/22 1351    Order Status: Completed Specimen: Tissue Updated: 12/28/22 2102      Significant Indicator .     Source TISS     Site Left anterior tibia     Gram Stain Result Rare WBCs.  No organisms seen.      Narrative:      Surgery Specimen    GRAM STAIN [[id number]] Collected: 12/28/22 1350    Order Status: Completed Specimen: Tissue Updated: 12/28/22 2102     Significant Indicator .     Source TISS     Site Left knee lateral gutter     Gram Stain Result Many WBCs.  No organisms seen.      Narrative:      Surgery Specimen    GRAM STAIN [[id number]] Collected: 12/28/22 1432    Order Status: Completed Specimen: Tissue Updated: 12/28/22 2101     Significant Indicator .     Source TISS     Site Left femur     Gram Stain Result Rare WBCs.  No organisms seen.      Narrative:      Surgery Specimen    GRAM STAIN [[id number]] Collected: 12/28/22 1433    Order Status: Completed Specimen: Tissue Updated: 12/28/22 2100     Significant Indicator .     Source TISS     Site Left Tibia     Gram Stain Result Many WBCs.  No organisms seen.      Narrative:      Surgery Specimen    GRAM STAIN [[id number]] Collected: 12/28/22 1349    Order Status: Completed Specimen: Tissue Updated: 12/28/22 2100     Significant Indicator .     Source TISS     Site Left Knee Medial Gutter     Gram Stain Result Rare WBCs.  No organisms seen.      Narrative:      Surgery Specimen    Urine Culture (NEW) [[id number]]  (Abnormal)  (Susceptibility) Collected: 12/26/22 2149    Order Status: Completed Specimen: Urine Updated: 12/28/22 0757     Significant Indicator POS     Source UR     Site -     Culture Result -      Escherichia coli  >100,000 cfu/mL      Narrative:      Indication for culture:->Evaluation for sepsis without a  clear source of infection  Indication for culture:->Evaluation for sepsis without a    Susceptibility       Escherichia coli (1)       Antibiotic Interpretation Microscan   Method Status    Ampicillin Resistant >16 mcg/mL SHERRIE Final    Ceftriaxone Sensitive <=1 mcg/mL SHERRIE Final    Cefazolin Sensitive <=2 mcg/mL SHERRIE  Final     Breakpoints when Cefazolin is used for therapy of infections  other than uncomplicated UTIs due to Enterobacterales are as  follows:  SHERRIE and Interpretation:  <=2 S  4 I  >=8 R         Ciprofloxacin Sensitive <=0.25 mcg/mL SHERRIE Final    Cefepime Sensitive <=2 mcg/mL SHERRIE Final    Cefuroxime Sensitive <=4 mcg/mL SHERRIE Final    Ampicillin/sulbactam Intermediate 16/8 mcg/mL SHERRIE Final    Tobramycin Sensitive <=2 mcg/mL SHERRIE Final    Nitrofurantoin Sensitive <=32 mcg/mL SHERRIE Final    Gentamicin Sensitive <=2 mcg/mL SHERRIE Final    Levofloxacin Sensitive <=0.5 mcg/mL SHERRIE Final    Minocycline Sensitive <=4 mcg/mL SHERRIE Final    Pip/Tazobactam Sensitive <=8 mcg/mL SHERRIE Final    Trimeth/Sulfa Sensitive <=0.5/9.5 mcg/mL SHERRIE Final    Tigecycline Sensitive <=2 mcg/mL SHERRIE Final                       FLUID CULTURE W/GRAM STAIN [[id number]]  (Abnormal)  (Susceptibility) Collected: 12/26/22 2226    Order Status: Completed Specimen: Synovial Fluid Updated: 12/28/22 0735     Significant Indicator POS     Source SYNO     Site Left Knee     Culture Result -     Gram Stain Result Many WBCs.  Rare Gram positive cocci.       Culture Result Streptococcus agalactiae (Group B)  Light growth  This isolate does NOT demonstrate inducible Clindamycin  resistance in vitro.      Narrative:      CALL  [place]  131 tel. [phone],  CALLED  131 tel. [phone] 12/27/2022, 13:30, RB PERF. RESULTS CALLED  TO:Anjali [id number] Rn    Susceptibility       Streptococcus agalactiae (group b) (1)       Antibiotic Interpretation Microscan   Method Status    Daptomycin Sensitive <=0.5 mcg/mL SHERRIE Final    Clindamycin Sensitive <=0.25 mcg/mL SHERRIE Final    Penicillin Sensitive <=0.03 mcg/mL SHERRIE Final                       MRSA By PCR (Amp) [[id number]] Collected: 12/27/22 0034    Order Status: Completed Specimen: Respirate from Nares Updated: 12/28/22 0710     MRSA by PCR Negative    Blood Culture [[id number]] Collected: 12/26/22 2202    Order Status: Completed Specimen:  "Blood from Peripheral Updated: 12/27/22 0747     Significant Indicator NEG     Source BLD     Site PERIPHERAL     Culture Result No Growth  Note: Blood cultures are incubated for 5 days and  are monitored continuously.Positive blood cultures  are called to the RN and reported as soon as  they are identified.      Narrative:      1 of 2 for Blood Culture x 2 sites order. Per Hospital  Policy: Only change Specimen Src: to \"Line\" if specified by  physician order.  No site indicated    Blood Culture [[id number]] Collected: 12/26/22 2225    Order Status: Completed Specimen: Blood from Peripheral Updated: 12/27/22 0747     Significant Indicator NEG     Source BLD     Site PERIPHERAL     Culture Result No Growth  Note: Blood cultures are incubated for 5 days and  are monitored continuously.Positive blood cultures  are called to the RN and reported as soon as  they are identified.      Narrative:      2 of 2 blood culture x2  Sites order. Per Hospital Policy:  Only change Specimen Src: to \"Line\" if specified by physician  order.  Left Hand    GRAM STAIN [[id number]] Collected: 12/26/22 2226    Order Status: Completed Specimen: Synovial Updated: 12/27/22 0022     Significant Indicator .     Source SYNO     Site Left Knee     Gram Stain Result Many WBCs.  Rare Gram positive cocci.      Urinalysis [[id number]]  (Abnormal) Collected: 12/26/22 2149    Order Status: Completed Specimen: Urine Updated: 12/26/22 2227     Color Yellow     Character Cloudy     Specific Gravity 1.016     Ph 6.0     Glucose Negative mg/dL      Ketones Negative mg/dL      Protein 100 mg/dL      Bilirubin Negative     Urobilinogen, Urine 1.0     Nitrite Positive     Leukocyte Esterase Small     Occult Blood Large     Micro Urine Req Microscopic    Narrative:      Indication for culture:->Evaluation for sepsis without a  clear source of infection    CoV-2, FLU A/B, and RSV by PCR (2-4 Hours CEPHEID) : Collect NP swab in VTM [[id number]] Collected: 12/26/22 " 2128    Order Status: Completed Specimen: Respirate Updated: 12/26/22 2222     Influenza virus A RNA Negative     Influenza virus B, PCR Negative     RSV, PCR Negative     SARS-CoV-2 by PCR NotDetected     Comment: [facility] providers: PLEASE REFER TO DE-ESCALATION AND RETESTING PROTOCOL  on [email or web address]    **The [facility] GeneXpert Xpress SARS-CoV-2 RT-PCR Test has been made  available for use under the Emergency Use Authorization (EUA) only.          SARS-CoV-2 Source NP Swab            Assessment:  April Alicia is a 62 y.o. female patient with history of alcohol and cocaine abuse, recently admitted with left knee prosthetic joint infection and left shoulder septic arthritis status post I&D of the left shoulder and removal of left knee hardware with placement of antibiotic spacer, completed 6 weeks of IV ceftriaxone at a facility on 8/30/2022.  She unfortunately was lost to follow-up and could not make several follow-up appointments, either with ID or orthopedics.  She is now readmitted with recurrent infection of the left knee spacer     Pertinent Diagnoses:  Left knee chronic prosthetic joint infection  Group B strep infection  UTI, E. coli  Recent left shoulder septic arthritis  History of alcohol and cocaine abuse    Plan:  -Patient was taken to the OR on 12/28, underwent explantation and placement of articulating spacer, wound VAC.  Multiple OR cultures again growing group B Strep  -Okay to complete a 3-day course of IV ceftriaxone to cover the E. coli UTI, and then can go to IV Ancef 2 g every 8 hours  -Anticipate holding antibiotics after completion of above course in preparation for repeat aspiration off of antibiotics and second stage reimplantation by orthopedics.  Patient may benefit from a course of oral antibiotics after second stage reimplantation    Disposition: Recommend completing 6 weeks of IV Ancef 2 g every 8 hours at a facility through 2/7/2023  At least weekly CBC with  differential, CMP while on IV antibiotics  Need for PICC line: Yes, okay to place    Discussed with internal medicine, Dr. Ortiz    ID will sign off.  Please call with questions.  ID clinic follow-up in 4 to 6 weeks

## 2022-12-30 NOTE — PROGRESS NOTES
4 Eyes Skin Assessment Completed by Susie RN and Ca MONTAÑO.     Head WDL  Ears WDL  Nose WDL  Mouth WDL  Neck WDL  Breast/Chest WDL  Shoulder Blades WDL  Spine WDL  (R) Arm/Elbow/Hand WDL  (L) Arm/Elbow/Hand WDL  Abdomen WDL  Groin WDL  Scrotum/Coccyx/Buttocks WDL  (R) Leg discoloration otherwise intact  (L) Leg Surgical Incision Site Covered by Ace Wrap, Immobilizer   (R) Heel/Foot/Toe edema  (L) Heel/Foot/Toe edema           Devices In Places Blood Pressure Cuff, Pulse Ox, SCD's, and Nasal Cannula, Immobilizer Boot, Prevena [place] to LLE, Purewick        Interventions In Place KIMMY, TAPS, Dri-Shalom sheets, Gray Ear Foams, Heels Loaded W/Pillows    Possible Skin Injury No     Pictures Uploaded Into Epic N/A  Wound Consult Placed N/A  RN Wound Prevention Protocol Ordered No

## 2022-12-30 NOTE — DISCHARGE PLANNING
Case Management Discharge Planning    Admission Date: 12/26/2022  GMLOS: 1.7  ALOS: 4    6-Clicks ADL Score: 12  6-Clicks Mobility Score: 7  PT and/or OT Eval ordered: Yes  Post-acute Referrals Ordered: No   Post-acute Choice Obtained: No  Has referral(s) been sent to post-acute provider:  No      Anticipated Discharge Dispo: Discharge Disposition: D/T to Medicaid only Nursing home (64)    DME Needed: No    Action(s) Taken: MDR discussion, fever and nausea and vomiting, not stable for discharge yet.     Escalations Completed: None    Medically Clear: No    Next Steps: Medical stability     Barriers to Discharge: Medical clearance    Is the patient up for discharge tomorrow: No

## 2022-12-30 NOTE — CARE PLAN
The patient is Stable - Low risk of patient condition declining or worsening    Shift Goals  Clinical Goals: safety, pain management  Patient Goals: pain control, rest  Family Goals: N/A    Progress made toward(s) clinical / shift goals:    Problem: Pain - Standard  Goal: Alleviation of pain or a reduction in pain to the patient’s comfort goal  Outcome: Progressing   Reports pain mark shoulders and knee (sx site), medicated per MAR.  Non pharmacologic comfort measure includes rest, repositioning.    Problem: Fall Risk  Goal: Patient will remain free from falls  Outcome: Progressing    Fall Precautions in place: Non-skid socks on, bed locked and in lowest position, upper bed rails up, bed alarm on, DME in bathroom, call light within reach.        Problem: Skin Integrity  Goal: Skin integrity is maintained or improved  Outcome: Progressing  See 4 eyes skin note.    Patient is not progressing towards the following goals:

## 2022-12-30 NOTE — PROGRESS NOTES
Hospital Medicine Daily Progress Note    Date of Service  12/30/2022    Chief Complaint  April Alicia is a 62 y.o. female admitted 12/26/2022 with left knee pain    Hospital Course  Mrs. Alicia is our 62 year old female patient with a past medical history significant for cocaine and ETOH abuse, cirrhosis, asthma, atrial fibrillation on apixaban, diabetes mellitus, hypertension who presents to the hospital with chief complaint of increased pain in her LEFT knee associated with fevers and chills that began today. Patient describes difficulty ambulating even short distances due to the pain which is atypical for her. Denies any nausea, vomiting, tremors, falls, loss of consciousness, or recent trauma to the knee. Patient is not a reliable historian; tangential when answering to questions. History procurement substantiated by thorough chart review.     Patient with history of bilateral knee surgeries. In 7/22, was found to have group B streptococcal bacteremia secondary to infection of tibial component of LEFT arthroplasty hardware, at which point orthopedics was consulted, arthrocentesis performed which confirmed infection, underwent LEFT knee revision, total arthroplasty with antibiotic spacer placed. Plan was then to continue with CTX daily through 8/30/22. Followed with Dr. Gray (of [facility]).     Patient endorses she has not drank alcohol for weeks, and denies recent cocaine use.    In the ER the patient underwent arthrocentesis of the left knee which showed 21,150 WBCs, 86% PMNs.  Culture grew group B strep.  Orthopedics and ID were consulted. She went to surgery with Dr. Luz on 12/28/22. She had symptoms of UTI and ucx grew E. Coli.  She was started on unasyn/vanc then switched to ceftriaxone.     Interval Problem Update  - febrile overnight 38.3  - POD#2 s/p I&P L knee, cultures pending  - developed n/v overnight, diarrhea  - H/H stable  - BP stable      I have discussed this  patient's plan of care and discharge plan at IDT rounds today with Case Management, Nursing, Nursing leadership, and other members of the IDT team.    Consultants/Specialty  infectious disease and orthopedics    Code Status  Full Code    Disposition  Patient is not medically cleared for discharge.   Anticipate discharge to  TBD .  I have placed the appropriate orders for post-discharge needs.    Review of Systems  Review of Systems   Constitutional:  Positive for fever and malaise/fatigue.   HENT:  Positive for hearing loss.    Eyes:  Negative for redness.   Respiratory:  Negative for shortness of breath.    Cardiovascular:  Negative for chest pain.   Gastrointestinal:  Positive for diarrhea, nausea and vomiting. Negative for constipation.   Genitourinary:  Positive for dysuria.   Musculoskeletal:  Positive for joint pain.   Skin: Negative.    Neurological:  Negative for dizziness.   Psychiatric/Behavioral:  Negative for substance abuse.       Physical Exam  Temp:  [37.3 °C (99.1 °F)-38.3 °C (100.9 °F)] 37.5 °C (99.5 °F)  Pulse:  [62-73] 73  Resp:  [12-16] 16  BP: ()/(56-64) 125/64  SpO2:  [91 %-97 %] 91 %    Physical Exam  Constitutional:       General: She is not in acute distress.     Appearance: She is not toxic-appearing or diaphoretic.   HENT:      Head: Normocephalic and atraumatic.      Nose: Nose normal.      Mouth/Throat:      Mouth: Mucous membranes are moist.   Eyes:      General: No scleral icterus.     Conjunctiva/sclera: Conjunctivae normal.   Cardiovascular:      Rate and Rhythm: Normal rate and regular rhythm.      Heart sounds: Murmur heard.     No friction rub. No gallop.   Pulmonary:      Effort: Pulmonary effort is normal.      Breath sounds: Normal breath sounds.   Abdominal:      General: Bowel sounds are normal. There is no distension.      Palpations: Abdomen is soft.      Tenderness: There is no abdominal tenderness.   Musculoskeletal:      Cervical back: Normal range of motion.       Left knee: Tenderness present.      Right lower leg: Edema (ankles) present.      Left lower leg: Edema (ankles) present.      Comments: L knee wrapped, drain in place   Skin:     Findings: No erythema.      Comments: Skin graft scars b/l LEs   Neurological:      Mental Status: She is alert and oriented to person, place, and time. Mental status is at baseline.   Psychiatric:         Mood and Affect: Mood normal.         Behavior: Behavior normal.         Thought Content: Thought content normal.       Fluids    Intake/Output Summary (Last 24 hours) at 12/30/2022 0956  Last data filed at 12/30/2022 0800  Gross per 24 hour   Intake 240 ml   Output 1200 ml   Net -960 ml         Laboratory  Recent Labs     12/28/22  0747 12/29/22  0818 12/30/22  0421   WBC 8.3 6.9 6.0   RBC 3.43* 2.47* 2.57*   HEMOGLOBIN 10.0* 7.1* 7.3*   HEMATOCRIT 31.7* 22.2* 22.7*   MCV 92.4 89.9 88.3   MCH 29.2 28.7 28.4   MCHC 31.5* 32.0* 32.2*   RDW 59.4* 56.0* 54.1*   PLATELETCT 82* 69* 85*   MPV 11.7 10.2 12.0       Recent Labs     12/28/22  0747 12/29/22  0818 12/30/22  0421   SODIUM 138 131* 132*   POTASSIUM 3.9 4.2 4.4   CHLORIDE 113* 106 106   CO2 16* 18* 18*   GLUCOSE 114* 118* 118*   BUN 22 27* 29*   CREATININE 0.72 1.12 1.23   CALCIUM 7.8* 7.3* 7.7*                     Imaging  DX-KNEE 2- LEFT   Final Result      Antibiotic impregnated radiopaque beads within and about the knee arthroplasty.      DX-SHOULDER 2+ RIGHT   Final Result      1.  Moderately advanced acromioclavicular degenerative changes.   2.  Acromiohumeral narrowing suggesting chronic rotator cuff tears.   3.  No acute fracture or dislocation.      DX-CHEST-PORTABLE (1 VIEW)   Final Result      1.  No acute cardiopulmonary abnormality.   2.  Prior granulomatous exposure.         DX-KNEE 3 VIEWS LEFT   Final Result      1.  Revised left knee arthroplasty is unchanged since prior. No evidence of hardware complication.   2.  No acute fracture or dislocation.   3.  Marked soft  tissue swelling and suspected knee effusion.      MR-SHOULDER-W/O LEFT    (Results Pending)          Assessment/Plan  * Septic joint (HCC)- (present on admission)  Assessment & Plan  Patient has a history of group B strep bacteremia secondary to infection of tibial component of left arthroplasty hardware  Also with septic L shoulder  Status post antibiotic spacer placement by orthopedics (Dr. Luz)  Comes in with left knee pain and swelling and L shoulder pain  Arthrocentesis in the ER concerning for septic knee  Synovial cultures growing group B strep  Orthopedics consulted: I&D 12/28, cultures growing GBS  Infectious disease consulted    Pain mgmt  PT/OT  MRI Shoulder    Metabolic acidosis  Assessment & Plan  Normal AG, likely in setting of multiple BMs from lactulose  Improved with IVF      Acute encephalopathy  Assessment & Plan  Likely in setting of infection +/- hepatic encephalopathy  Ammonia normal  Treat infection  Decreased gabapentin  Improved today, thinking more clearly  Having multiple BMs    GERD (gastroesophageal reflux disease)  Assessment & Plan  Continue home omeprazole    Type 2 diabetes mellitus (HCC)  Assessment & Plan  Diet controlled  SSI    Anemia of chronic disease  Assessment & Plan  H/H stable 10-->7-->7.3  CTM  Transfuse if hemoglobin < 7    Acute cystitis without hematuria- (present on admission)  Assessment & Plan  Patient c/o pain with urination and lower abd discomfort  UA c/f infection  ucx growing e.coli resistant to PCN  Switched from unasyn to CTX    ELIZABETH (acute kidney injury) (HCC)  Assessment & Plan  Cr bumped today 1.2  May have been from low BPs yesterday and n/v/d  Will give small bolus today  CTM    Atrial fibrillation (HCC)- (present on admission)  Assessment & Plan  Continue home dronedarone 400 mg twice daily  Holding Eliquis    Cirrhosis (HCC)- (present on admission)  Assessment & Plan  In setting of alcohol use however no EtOH in over a year  Continue home  lactulose    Hypertension- (present on admission)  Assessment & Plan  Home regimen: Losartan 100 mg daily, felodipine 5 mg q. evening  Inpatient regimen: continue home meds  BP soft today, recheck H/H, holding losartan (also Cr bumped slightly)    Thrombocytopenia (HCC)- (present on admission)  Assessment & Plan  Likely in setting of cirrhosis  Holding Eliquis  Platelets stable  Continue to monitor           VTE prophylaxis: SCDs/TEDs, holding home eliquis    I have performed a physical exam and reviewed and updated ROS and Plan today (12/30/2022). In review of yesterday's note (12/29/2022), there are no changes except as documented above.

## 2022-12-31 LAB
ANION GAP SERPL CALC-SCNC: 8 MMOL/L (ref 7–16)
BACTERIA BLD CULT: NORMAL
BACTERIA BLD CULT: NORMAL
BACTERIA SPEC ANAEROBE CULT: NORMAL
BUN SERPL-MCNC: 20 MG/DL (ref 8–22)
CALCIUM SERPL-MCNC: 8.2 MG/DL (ref 8.5–10.5)
CHLORIDE SERPL-SCNC: 110 MMOL/L (ref 96–112)
CO2 SERPL-SCNC: 17 MMOL/L (ref 20–33)
CREAT SERPL-MCNC: 0.76 MG/DL (ref 0.5–1.4)
ERYTHROCYTE [DISTWIDTH] IN BLOOD BY AUTOMATED COUNT: 54.9 FL (ref 35.9–50)
GFR SERPLBLD CREATININE-BSD FMLA CKD-EPI: 88 ML/MIN/1.73 M 2
GLUCOSE BLD STRIP.AUTO-MCNC: 113 MG/DL (ref 65–99)
GLUCOSE BLD STRIP.AUTO-MCNC: 130 MG/DL (ref 65–99)
GLUCOSE BLD STRIP.AUTO-MCNC: 165 MG/DL (ref 65–99)
GLUCOSE BLD STRIP.AUTO-MCNC: 184 MG/DL (ref 65–99)
GLUCOSE SERPL-MCNC: 109 MG/DL (ref 65–99)
HCT VFR BLD AUTO: 24.3 % (ref 37–47)
HGB BLD-MCNC: 7.7 G/DL (ref 12–16)
MCH RBC QN AUTO: 28.6 PG (ref 27–33)
MCHC RBC AUTO-ENTMCNC: 31.7 G/DL (ref 33.6–35)
MCV RBC AUTO: 90.3 FL (ref 81.4–97.8)
PLATELET # BLD AUTO: 99 K/UL (ref 164–446)
PMV BLD AUTO: 9.5 FL (ref 9–12.9)
POTASSIUM SERPL-SCNC: 4.3 MMOL/L (ref 3.6–5.5)
RBC # BLD AUTO: 2.69 M/UL (ref 4.2–5.4)
SIGNIFICANT IND 70042: NORMAL
SITE SITE: NORMAL
SODIUM SERPL-SCNC: 135 MMOL/L (ref 135–145)
SOURCE SOURCE: NORMAL
WBC # BLD AUTO: 5.3 K/UL (ref 4.8–10.8)

## 2022-12-31 PROCEDURE — 80048 BASIC METABOLIC PNL TOTAL CA: CPT

## 2022-12-31 PROCEDURE — 770001 HCHG ROOM/CARE - MED/SURG/GYN PRIV*

## 2022-12-31 PROCEDURE — A9270 NON-COVERED ITEM OR SERVICE: HCPCS | Performed by: INTERNAL MEDICINE

## 2022-12-31 PROCEDURE — A9270 NON-COVERED ITEM OR SERVICE: HCPCS | Performed by: STUDENT IN AN ORGANIZED HEALTH CARE EDUCATION/TRAINING PROGRAM

## 2022-12-31 PROCEDURE — 700102 HCHG RX REV CODE 250 W/ 637 OVERRIDE(OP): Performed by: INTERNAL MEDICINE

## 2022-12-31 PROCEDURE — 700102 HCHG RX REV CODE 250 W/ 637 OVERRIDE(OP): Performed by: STUDENT IN AN ORGANIZED HEALTH CARE EDUCATION/TRAINING PROGRAM

## 2022-12-31 PROCEDURE — 99232 SBSQ HOSP IP/OBS MODERATE 35: CPT | Performed by: INTERNAL MEDICINE

## 2022-12-31 PROCEDURE — 82962 GLUCOSE BLOOD TEST: CPT | Mod: 91

## 2022-12-31 PROCEDURE — 36415 COLL VENOUS BLD VENIPUNCTURE: CPT

## 2022-12-31 PROCEDURE — 85027 COMPLETE CBC AUTOMATED: CPT

## 2022-12-31 PROCEDURE — 700111 HCHG RX REV CODE 636 W/ 250 OVERRIDE (IP): Performed by: INTERNAL MEDICINE

## 2022-12-31 RX ORDER — LACTULOSE 20 G/30ML
15 SOLUTION ORAL DAILY
Status: DISCONTINUED | OUTPATIENT
Start: 2023-01-01 | End: 2023-01-10 | Stop reason: HOSPADM

## 2022-12-31 RX ADMIN — ACETAMINOPHEN 650 MG: 325 TABLET ORAL at 08:24

## 2022-12-31 RX ADMIN — GABAPENTIN 200 MG: 100 CAPSULE ORAL at 04:54

## 2022-12-31 RX ADMIN — CEFTRIAXONE SODIUM 2000 MG: 10 INJECTION, POWDER, FOR SOLUTION INTRAVENOUS at 04:53

## 2022-12-31 RX ADMIN — ACETAMINOPHEN 650 MG: 325 TABLET ORAL at 16:55

## 2022-12-31 RX ADMIN — TRAMADOL HYDROCHLORIDE 50 MG: 50 TABLET, COATED ORAL at 20:02

## 2022-12-31 RX ADMIN — Medication 220 MG: at 04:53

## 2022-12-31 RX ADMIN — DRONEDARONE 400 MG: 400 TABLET, FILM COATED ORAL at 08:24

## 2022-12-31 RX ADMIN — TRAMADOL HYDROCHLORIDE 50 MG: 50 TABLET, COATED ORAL at 12:52

## 2022-12-31 RX ADMIN — ATORVASTATIN CALCIUM 20 MG: 20 TABLET, FILM COATED ORAL at 20:02

## 2022-12-31 RX ADMIN — GABAPENTIN 200 MG: 100 CAPSULE ORAL at 16:55

## 2022-12-31 RX ADMIN — DRONEDARONE 400 MG: 400 TABLET, FILM COATED ORAL at 16:54

## 2022-12-31 RX ADMIN — INSULIN HUMAN 2 UNITS: 100 INJECTION, SOLUTION PARENTERAL at 21:05

## 2022-12-31 RX ADMIN — OMEPRAZOLE 20 MG: 20 CAPSULE, DELAYED RELEASE ORAL at 18:00

## 2022-12-31 RX ADMIN — TRAMADOL HYDROCHLORIDE 50 MG: 50 TABLET, COATED ORAL at 06:07

## 2022-12-31 RX ADMIN — INSULIN HUMAN 2 UNITS: 100 INJECTION, SOLUTION PARENTERAL at 17:03

## 2022-12-31 RX ADMIN — NICOTINE 14 MG: 14 PATCH TRANSDERMAL at 04:54

## 2022-12-31 RX ADMIN — OMEPRAZOLE 20 MG: 20 CAPSULE, DELAYED RELEASE ORAL at 04:54

## 2022-12-31 ASSESSMENT — ENCOUNTER SYMPTOMS
EYE REDNESS: 0
DIZZINESS: 0
VOMITING: 0
FEVER: 0
SHORTNESS OF BREATH: 0
DIARRHEA: 0
CONSTIPATION: 0
CHILLS: 0
WEAKNESS: 1
NAUSEA: 0

## 2022-12-31 ASSESSMENT — PAIN DESCRIPTION - PAIN TYPE
TYPE: SURGICAL PAIN
TYPE: SURGICAL PAIN
TYPE: ACUTE PAIN
TYPE: SURGICAL PAIN

## 2022-12-31 ASSESSMENT — LIFESTYLE VARIABLES: SUBSTANCE_ABUSE: 0

## 2022-12-31 NOTE — PROGRESS NOTES
Hospital Medicine Daily Progress Note    Date of Service  12/31/2022    Chief Complaint  April Alicia is a 62 y.o. female admitted 12/26/2022 with left knee pain    Hospital Course  Mrs. Alicia is our 62 year old female patient with a past medical history significant for cocaine and ETOH abuse, cirrhosis, asthma, atrial fibrillation on apixaban, diabetes mellitus, hypertension who presents to the hospital with chief complaint of increased pain in her LEFT knee associated with fevers and chills that began today. Patient describes difficulty ambulating even short distances due to the pain which is atypical for her. Denies any nausea, vomiting, tremors, falls, loss of consciousness, or recent trauma to the knee. Patient is not a reliable historian; tangential when answering to questions. History procurement substantiated by thorough chart review.     Patient with history of bilateral knee surgeries. In 7/22, was found to have group B streptococcal bacteremia secondary to infection of tibial component of LEFT arthroplasty hardware, at which point orthopedics was consulted, arthrocentesis performed which confirmed infection, underwent LEFT knee revision, total arthroplasty with antibiotic spacer placed. Plan was then to continue with CTX daily through 8/30/22. Followed with Dr. Gray (of [facility]).     Patient endorses she has not drank alcohol for weeks, and denies recent cocaine use.    In the ER the patient underwent arthrocentesis of the left knee which showed 21,150 WBCs, 86% PMNs.  Culture grew group B strep.  Orthopedics and ID were consulted. She went to surgery with Dr. Luz on 12/28/22.   Surgery cultures grew GBS.  She had symptoms of UTI and ucx grew E. Coli.  She was started on unasyn/vanc then switched to ceftriaxone x 3 days then switched to ancef to complete 6 week course, end date 2/7/23, needs weekly CBC,CMP.     Interval Problem Update  - afebrile  - POD#3 s/p I&P L  knee, cultures pending  - n/v resolved, tolerating PO, asking for regular diet  - Cr improved  - H/H stable  - finished CTX for UTI, switching back to Ancef      I have discussed this patient's plan of care and discharge plan at IDT rounds today with Case Management, Nursing, Nursing leadership, and other members of the IDT team.    Consultants/Specialty  infectious disease and orthopedics    Code Status  Full Code    Disposition  Patient is not medically cleared for discharge.   Anticipate discharge to  D .  I have placed the appropriate orders for post-discharge needs.    Review of Systems  Review of Systems   Constitutional:  Positive for malaise/fatigue. Negative for chills and fever.   HENT:  Positive for hearing loss.    Eyes:  Negative for redness.   Respiratory:  Negative for shortness of breath.    Cardiovascular:  Negative for chest pain.   Gastrointestinal:  Negative for constipation, diarrhea, nausea and vomiting.   Genitourinary:  Negative for dysuria.   Musculoskeletal:  Positive for joint pain.   Skin: Negative.    Neurological:  Positive for weakness. Negative for dizziness.   Psychiatric/Behavioral:  Negative for substance abuse.       Physical Exam  Temp:  [37.1 °C (98.8 °F)-37.4 °C (99.3 °F)] 37.1 °C (98.8 °F)  Pulse:  [60-70] 66  Resp:  [16-17] 16  BP: (102-124)/(61-68) 124/64  SpO2:  [90 %-96 %] 90 %    Physical Exam  Constitutional:       General: She is not in acute distress.     Appearance: She is not toxic-appearing or diaphoretic.   HENT:      Head: Normocephalic and atraumatic.      Nose: Nose normal.      Mouth/Throat:      Mouth: Mucous membranes are moist.   Eyes:      General: No scleral icterus.     Conjunctiva/sclera: Conjunctivae normal.   Cardiovascular:      Rate and Rhythm: Normal rate and regular rhythm.      Heart sounds: Murmur heard.     No friction rub. No gallop.   Pulmonary:      Effort: Pulmonary effort is normal.      Breath sounds: Normal breath sounds.   Abdominal:       General: Bowel sounds are normal. There is no distension.      Palpations: Abdomen is soft.      Tenderness: There is no abdominal tenderness.   Musculoskeletal:      Cervical back: Normal range of motion.      Left knee: Tenderness present.      Right lower leg: Edema (ankles) present.      Left lower leg: Edema (ankles) present.      Comments: L knee wrapped, drain in place   Skin:     Findings: No erythema.      Comments: Skin graft scars b/l LEs   Neurological:      Mental Status: She is alert and oriented to person, place, and time. Mental status is at baseline.   Psychiatric:         Mood and Affect: Mood normal.         Behavior: Behavior normal.         Thought Content: Thought content normal.       Fluids    Intake/Output Summary (Last 24 hours) at 12/31/2022 1252  Last data filed at 12/31/2022 0420  Gross per 24 hour   Intake 860 ml   Output 3400 ml   Net -2540 ml         Laboratory  Recent Labs     12/29/22  0818 12/30/22  0421 12/31/22  0801   WBC 6.9 6.0 5.3   RBC 2.47* 2.57* 2.69*   HEMOGLOBIN 7.1* 7.3* 7.7*   HEMATOCRIT 22.2* 22.7* 24.3*   MCV 89.9 88.3 90.3   MCH 28.7 28.4 28.6   MCHC 32.0* 32.2* 31.7*   RDW 56.0* 54.1* 54.9*   PLATELETCT 69* 85* 99*   MPV 10.2 12.0 9.5       Recent Labs     12/29/22  0818 12/30/22  0421 12/31/22  0801   SODIUM 131* 132* 135   POTASSIUM 4.2 4.4 4.3   CHLORIDE 106 106 110   CO2 18* 18* 17*   GLUCOSE 118* 118* 109*   BUN 27* 29* 20   CREATININE 1.12 1.23 0.76   CALCIUM 7.3* 7.7* 8.2*                     Imaging  DX-KNEE 2- LEFT   Final Result      Antibiotic impregnated radiopaque beads within and about the knee arthroplasty.      DX-SHOULDER 2+ RIGHT   Final Result      1.  Moderately advanced acromioclavicular degenerative changes.   2.  Acromiohumeral narrowing suggesting chronic rotator cuff tears.   3.  No acute fracture or dislocation.      DX-CHEST-PORTABLE (1 VIEW)   Final Result      1.  No acute cardiopulmonary abnormality.   2.  Prior granulomatous  exposure.         DX-KNEE 3 VIEWS LEFT   Final Result      1.  Revised left knee arthroplasty is unchanged since prior. No evidence of hardware complication.   2.  No acute fracture or dislocation.   3.  Marked soft tissue swelling and suspected knee effusion.      MR-SHOULDER-W/O LEFT    (Results Pending)          Assessment/Plan  * Septic joint (HCC)- (present on admission)  Assessment & Plan  Patient has a history of group B strep bacteremia secondary to infection of tibial component of left arthroplasty hardware  Also with septic L shoulder  Status post antibiotic spacer placement by orthopedics (Dr. Luz)  Comes in with left knee pain and swelling and L shoulder pain  Arthrocentesis in the ER concerning for septic knee  Synovial cultures growing group B strep  Orthopedics consulted: I&D 12/28, cultures growing GBS  Infectious disease consulted: recommending Ancef x 6 weeks, end date 2/7/23, needs weekly labs CBC, CMP    Pain mgmt  PT/OT  MRI Shoulder    Metabolic acidosis  Assessment & Plan  Normal AG, likely in setting of multiple BMs from lactulose  Improved with IVF      Acute encephalopathy  Assessment & Plan  Likely in setting of infection +/- hepatic encephalopathy  Ammonia normal  Treat infection  Decreased gabapentin  Improved today, thinking more clearly  Having multiple BMs    GERD (gastroesophageal reflux disease)  Assessment & Plan  Continue home omeprazole    Type 2 diabetes mellitus (HCC)  Assessment & Plan  Diet controlled  SSI  Will check A1c, if normal will dc SSI and diabetic diet since patient asking to order her own food.     Anemia of chronic disease  Assessment & Plan  H/H stable 10-->7-->7.3-->7.7  Check iron studies  CTM  Transfuse if hemoglobin < 7    Acute cystitis without hematuria- (present on admission)  Assessment & Plan  Patient c/o pain with urination and lower abd discomfort  UA c/f infection  ucx growing e.coli resistant to PCN  Switched from unasyn to CTX x 3 days    ELIZABETH  (acute kidney injury) (HCC)  Assessment & Plan  Improved with small bolus  Cr bumped 1.2  May have been from low BPs yesterday and n/v/d  CTM    Atrial fibrillation (HCC)- (present on admission)  Assessment & Plan  Continue home dronedarone 400 mg twice daily  Holding Eliquis    Cirrhosis (HCC)- (present on admission)  Assessment & Plan  In setting of alcohol use however no EtOH in over a year  Continue home lactulose    Hypertension- (present on admission)  Assessment & Plan  Home regimen: Losartan 100 mg daily, felodipine 5 mg q. evening  Inpatient regimen: continue home meds  BP soft today, recheck H/H, holding losartan (also Cr bumped slightly)    Thrombocytopenia (HCC)- (present on admission)  Assessment & Plan  Likely in setting of cirrhosis  Holding Eliquis  Platelets stable  Continue to monitor           VTE prophylaxis: SCDs/TEDs, holding home eliquis    I have performed a physical exam and reviewed and updated ROS and Plan today (12/31/2022). In review of yesterday's note (12/30/2022), there are no changes except as documented above.

## 2022-12-31 NOTE — CARE PLAN
The patient is Stable - Low risk of patient condition declining or worsening    Shift Goals  Clinical Goals: pain control, mobility  Patient Goals: pain control  Family Goals: N/A    Progress made toward(s) clinical / shift goals:  Pt progressing overall. Emesis and loose stools today along with being weaker today.

## 2022-12-31 NOTE — PROGRESS NOTES
Surgery:  L knee exchange antibiotic spacer  Date of surgery: 12/28/22    Subjective: s/p L knee I&D and exchange antibiotic spacer    Objective:   General: awake, alert, no distress  MSK:   L knee in knee immobilizer. Fires EHL, Fhl< TA and GSC. SILT in dorsal and plantar foot.           Labs:   Lab Results   Component Value Date/Time    WBC 5.3 12/31/2022 08:01 AM    RBC 2.69 (L) 12/31/2022 08:01 AM    HEMOGLOBIN 7.7 (L) 12/31/2022 08:01 AM    HEMATOCRIT 24.3 (L) 12/31/2022 08:01 AM    MCV 90.3 12/31/2022 08:01 AM    MCH 28.6 12/31/2022 08:01 AM    MCHC 31.7 (L) 12/31/2022 08:01 AM    MPV 9.5 12/31/2022 08:01 AM    NEUTSPOLYS 89.50 (H) 12/28/2022 07:47 AM    LYMPHOCYTES 6.10 (L) 12/28/2022 07:47 AM    MONOCYTES 3.50 12/28/2022 07:47 AM    EOSINOPHILS 0.00 12/28/2022 07:47 AM    BASOPHILS 0.90 12/28/2022 07:47 AM    HYPOCHROMIA 1+ 12/26/2022 10:02 PM    ANISOCYTOSIS 1+ 12/28/2022 07:47 AM        Assessment/Plan: cultures +GBS  Weight bearing: TTWB, no knee ROM  DVT ppx: per primary  Diet: per primary  Antibiotics: per ID; will need 6 weeks IV antibiotics  Mobilize with PT/OT  Dressing: preveena dressing x7 days then transition to dry dressing  Disposition: likely SNF; will need f/u in clinic in 2 weeks for wound check

## 2022-12-31 NOTE — PROGRESS NOTES
4 Eyes Skin Assessment Completed by DANYEL Mina and DANYEL Braun.    Head WDL  Ears WDL  Nose WDL  Mouth WDL  Neck WDL  Breast/Chest WDL  Shoulder Blades WDL  Spine WDL  (R) Arm/Elbow/Hand WDL  (L) Arm/Elbow/Hand Bruising  Abdomen WDL  Groin WDL  Scrotum/Coccyx/Buttocks WDL  (R) Leg Discoloration, intact  (L) Leg Incision site covered by ace wrap, immobilizer. Discoloration, intact  (R) Heel/Foot/Toe Swelling  (L) Heel/Foot/Toe Swelling          Devices In Places Blood Pressure Cuff, Pulse Ox, and Nasal Cannula, Immobilizer, Prevena [place] to LLE, purewick      Interventions In Place Gray Ear Foams, TAP System, Pillows, Low Air Loss Mattress, and Dri-Shalom Pads    Possible Skin Injury No    Pictures Uploaded Into Epic N/A  Wound Consult Placed N/A  RN Wound Prevention Protocol Ordered No

## 2022-12-31 NOTE — CARE PLAN
Problem: Pain - Standard  Goal: Alleviation of pain or a reduction in pain to the patient’s comfort goal  Outcome: Progressing     Problem: Fall Risk  Goal: Patient will remain free from falls  Outcome: Progressing   The patient is Stable - Low risk of patient condition declining or worsening    Shift Goals  Clinical Goals: Pain control, safety  Patient Goals: Pain control  Family Goals: N/A  Progress made toward(s) clinical / shift goals:  yes    Patient is not progressing towards the following goals: n/a

## 2022-12-31 NOTE — CARE PLAN
The patient is Stable - Low risk of patient condition declining or worsening    Shift Goals  Clinical Goals: Pain control, safety  Patient Goals: Pain control  Family Goals: N/A    Progress made toward(s) clinical / shift goals:    Problem: Pain - Standard  Goal: Alleviation of pain or a reduction in pain to the patient’s comfort goal  Outcome: Progressing  Note: Patient states pain is relieved with current pain medication regimen. Patient medicated per MAR. Pt educated on nonpharmacologic methods to relieve pain such as distraction, repositioning, and ice packs.      Problem: Fall Risk  Goal: Patient will remain free from falls  Outcome: Progressing  Note: Patient educated on importance of use of call light when needing assistance. Bed locked and in lowest position. Bed alarm on. No DME at bedside. Call light and bedside table within reach. Hourly rounding in place.

## 2023-01-01 ENCOUNTER — APPOINTMENT (OUTPATIENT)
Dept: RADIOLOGY | Facility: MEDICAL CENTER | Age: 63
DRG: 486 | End: 2023-01-01
Attending: INTERNAL MEDICINE
Payer: MEDICAID

## 2023-01-01 LAB
ANION GAP SERPL CALC-SCNC: 7 MMOL/L (ref 7–16)
BUN SERPL-MCNC: 15 MG/DL (ref 8–22)
CALCIUM SERPL-MCNC: 8.1 MG/DL (ref 8.5–10.5)
CHLORIDE SERPL-SCNC: 110 MMOL/L (ref 96–112)
CO2 SERPL-SCNC: 18 MMOL/L (ref 20–33)
CREAT SERPL-MCNC: 0.69 MG/DL (ref 0.5–1.4)
ERYTHROCYTE [DISTWIDTH] IN BLOOD BY AUTOMATED COUNT: 52 FL (ref 35.9–50)
EST. AVERAGE GLUCOSE BLD GHB EST-MCNC: 108 MG/DL
FERRITIN SERPL-MCNC: 131 NG/ML (ref 10–291)
GFR SERPLBLD CREATININE-BSD FMLA CKD-EPI: 98 ML/MIN/1.73 M 2
GLUCOSE BLD STRIP.AUTO-MCNC: 108 MG/DL (ref 65–99)
GLUCOSE BLD STRIP.AUTO-MCNC: 98 MG/DL (ref 65–99)
GLUCOSE SERPL-MCNC: 100 MG/DL (ref 65–99)
HBA1C MFR BLD: 5.4 % (ref 4–5.6)
HCT VFR BLD AUTO: 22.6 % (ref 37–47)
HGB BLD-MCNC: 7.4 G/DL (ref 12–16)
IRON SATN MFR SERPL: 10 % (ref 15–55)
IRON SERPL-MCNC: 12 UG/DL (ref 40–170)
MCH RBC QN AUTO: 28.5 PG (ref 27–33)
MCHC RBC AUTO-ENTMCNC: 32.7 G/DL (ref 33.6–35)
MCV RBC AUTO: 86.9 FL (ref 81.4–97.8)
PLATELET # BLD AUTO: 121 K/UL (ref 164–446)
PMV BLD AUTO: 10 FL (ref 9–12.9)
POTASSIUM SERPL-SCNC: 4 MMOL/L (ref 3.6–5.5)
RBC # BLD AUTO: 2.6 M/UL (ref 4.2–5.4)
SODIUM SERPL-SCNC: 135 MMOL/L (ref 135–145)
TIBC SERPL-MCNC: 121 UG/DL (ref 250–450)
UIBC SERPL-MCNC: 109 UG/DL (ref 110–370)
WBC # BLD AUTO: 4.7 K/UL (ref 4.8–10.8)

## 2023-01-01 PROCEDURE — 700102 HCHG RX REV CODE 250 W/ 637 OVERRIDE(OP): Performed by: INTERNAL MEDICINE

## 2023-01-01 PROCEDURE — A9270 NON-COVERED ITEM OR SERVICE: HCPCS | Performed by: STUDENT IN AN ORGANIZED HEALTH CARE EDUCATION/TRAINING PROGRAM

## 2023-01-01 PROCEDURE — 82962 GLUCOSE BLOOD TEST: CPT | Mod: 91

## 2023-01-01 PROCEDURE — 83550 IRON BINDING TEST: CPT

## 2023-01-01 PROCEDURE — 770001 HCHG ROOM/CARE - MED/SURG/GYN PRIV*

## 2023-01-01 PROCEDURE — 700111 HCHG RX REV CODE 636 W/ 250 OVERRIDE (IP): Performed by: INTERNAL MEDICINE

## 2023-01-01 PROCEDURE — 82728 ASSAY OF FERRITIN: CPT

## 2023-01-01 PROCEDURE — 700102 HCHG RX REV CODE 250 W/ 637 OVERRIDE(OP): Performed by: STUDENT IN AN ORGANIZED HEALTH CARE EDUCATION/TRAINING PROGRAM

## 2023-01-01 PROCEDURE — 83540 ASSAY OF IRON: CPT

## 2023-01-01 PROCEDURE — 83036 HEMOGLOBIN GLYCOSYLATED A1C: CPT

## 2023-01-01 PROCEDURE — 36415 COLL VENOUS BLD VENIPUNCTURE: CPT

## 2023-01-01 PROCEDURE — 99232 SBSQ HOSP IP/OBS MODERATE 35: CPT | Performed by: INTERNAL MEDICINE

## 2023-01-01 PROCEDURE — 700105 HCHG RX REV CODE 258: Performed by: INTERNAL MEDICINE

## 2023-01-01 PROCEDURE — 73221 MRI JOINT UPR EXTREM W/O DYE: CPT | Mod: LT

## 2023-01-01 PROCEDURE — 85027 COMPLETE CBC AUTOMATED: CPT

## 2023-01-01 PROCEDURE — A9270 NON-COVERED ITEM OR SERVICE: HCPCS | Performed by: INTERNAL MEDICINE

## 2023-01-01 PROCEDURE — 80048 BASIC METABOLIC PNL TOTAL CA: CPT

## 2023-01-01 RX ORDER — OXYCODONE HYDROCHLORIDE 10 MG/1
10 TABLET ORAL EVERY 4 HOURS PRN
Status: DISCONTINUED | OUTPATIENT
Start: 2023-01-01 | End: 2023-01-10 | Stop reason: HOSPADM

## 2023-01-01 RX ORDER — OXYCODONE HYDROCHLORIDE 5 MG/1
5 TABLET ORAL EVERY 4 HOURS PRN
Status: DISCONTINUED | OUTPATIENT
Start: 2023-01-01 | End: 2023-01-10 | Stop reason: HOSPADM

## 2023-01-01 RX ADMIN — GABAPENTIN 200 MG: 100 CAPSULE ORAL at 17:16

## 2023-01-01 RX ADMIN — DRONEDARONE 400 MG: 400 TABLET, FILM COATED ORAL at 08:07

## 2023-01-01 RX ADMIN — OXYCODONE HYDROCHLORIDE 10 MG: 10 TABLET ORAL at 22:04

## 2023-01-01 RX ADMIN — TRAMADOL HYDROCHLORIDE 50 MG: 50 TABLET, COATED ORAL at 03:22

## 2023-01-01 RX ADMIN — OXYCODONE HYDROCHLORIDE 10 MG: 10 TABLET ORAL at 17:15

## 2023-01-01 RX ADMIN — SODIUM CHLORIDE 250 MG: 9 INJECTION, SOLUTION INTRAVENOUS at 18:34

## 2023-01-01 RX ADMIN — Medication 220 MG: at 05:42

## 2023-01-01 RX ADMIN — DRONEDARONE 400 MG: 400 TABLET, FILM COATED ORAL at 17:16

## 2023-01-01 RX ADMIN — OMEPRAZOLE 20 MG: 20 CAPSULE, DELAYED RELEASE ORAL at 05:42

## 2023-01-01 RX ADMIN — OMEPRAZOLE 20 MG: 20 CAPSULE, DELAYED RELEASE ORAL at 17:16

## 2023-01-01 RX ADMIN — ATORVASTATIN CALCIUM 20 MG: 20 TABLET, FILM COATED ORAL at 20:43

## 2023-01-01 RX ADMIN — APIXABAN 5 MG: 5 TABLET, FILM COATED ORAL at 17:16

## 2023-01-01 RX ADMIN — OXYCODONE HYDROCHLORIDE 5 MG: 5 TABLET ORAL at 08:06

## 2023-01-01 RX ADMIN — GABAPENTIN 200 MG: 100 CAPSULE ORAL at 05:42

## 2023-01-01 RX ADMIN — OXYCODONE HYDROCHLORIDE 10 MG: 10 TABLET ORAL at 13:58

## 2023-01-01 ASSESSMENT — PAIN DESCRIPTION - PAIN TYPE
TYPE: SURGICAL PAIN

## 2023-01-01 ASSESSMENT — ENCOUNTER SYMPTOMS
DIARRHEA: 0
NAUSEA: 0
CONSTIPATION: 0
WEAKNESS: 1
FEVER: 0
SHORTNESS OF BREATH: 0
CHILLS: 0
VOMITING: 0
DIZZINESS: 0
EYE REDNESS: 0

## 2023-01-01 ASSESSMENT — LIFESTYLE VARIABLES: SUBSTANCE_ABUSE: 0

## 2023-01-01 NOTE — PROGRESS NOTES
4 Eyes Skin Assessment Completed by DANYEL Mina and DANYEL Martell.    Head WDL  Ears WDL  Nose WDL  Mouth WDL  Neck WDL  Breast/Chest WDL  Shoulder Blades WDL  Spine WDL  (R) Arm/Elbow/Hand WDL  (L) Arm/Elbow/Hand Bruising  Abdomen WDL  Groin WDL  Scrotum/Coccyx/Buttocks WDL  (R) Leg Discoloration, intact  (L) Leg Incision site covered by ace wrap, immobilizer. Discoloration, intact.   (R) Heel/Foot/Toe Swelling  (L) Heel/Foot/Toe Swelling          Devices In Places Blood Pressure Cuff, Pulse Ox, and Leg Immobilizer, Prevena [place] to LLE, purewick      Interventions In Place Pillows, Low Air Loss Mattress, and Dri-Shalom Pads, Mepilex under immobilizer    Possible Skin Injury No    Pictures Uploaded Into Epic N/A  Wound Consult Placed N/A  RN Wound Prevention Protocol Ordered No

## 2023-01-01 NOTE — CARE PLAN
The patient is Stable - Low risk of patient condition declining or worsening    Shift Goals  Clinical Goals: Pain control, safety  Patient Goals: Pain control  Family Goals: N/A    Progress made toward(s) clinical / shift goals:    Problem: Pain - Standard  Goal: Alleviation of pain or a reduction in pain to the patient’s comfort goal  Outcome: Progressing  Note: Patient states pain is relieved with current pain medication regimen. Patient medicated per MAR. Patient provided with ice packs and pillows for comfort and repositioning.      Problem: Fall Risk  Goal: Patient will remain free from falls  Outcome: Progressing  Note: Patient educated on importance of use of call light when needing assistance. Bed locked and in lowest position, no DME at bedside, treaded socks on, call light and side table within reach, hourly rounding in place.

## 2023-01-01 NOTE — PROGRESS NOTES
Hospital Medicine Daily Progress Note    Date of Service  1/1/2023    Chief Complaint  April Alicia is a 62 y.o. female admitted 12/26/2022 with left knee pain    Hospital Course  Mrs. Alicia is our 62 year old female patient with a past medical history significant for cocaine and ETOH abuse, cirrhosis, asthma, atrial fibrillation on apixaban, diabetes mellitus, hypertension who presents to the hospital with chief complaint of increased pain in her LEFT knee associated with fevers and chills that began today. Patient describes difficulty ambulating even short distances due to the pain which is atypical for her. Denies any nausea, vomiting, tremors, falls, loss of consciousness, or recent trauma to the knee. Patient is not a reliable historian; tangential when answering to questions. History procurement substantiated by thorough chart review.     Patient with history of bilateral knee surgeries. In 7/22, was found to have group B streptococcal bacteremia secondary to infection of tibial component of LEFT arthroplasty hardware, at which point orthopedics was consulted, arthrocentesis performed which confirmed infection, underwent LEFT knee revision, total arthroplasty with antibiotic spacer placed. Plan was then to continue with CTX daily through 8/30/22. Followed with Dr. Gray (of [facility]).     Patient endorses she has not drank alcohol for weeks, and denies recent cocaine use.    In the ER the patient underwent arthrocentesis of the left knee which showed 21,150 WBCs, 86% PMNs.  Culture grew group B strep.  Orthopedics and ID were consulted. She went to surgery with Dr. Luz on 12/28/22.   Surgery cultures grew GBS.  She had symptoms of UTI and ucx grew E. Coli.  She was started on unasyn/vanc then switched to ceftriaxone x 3 days then switched to ancef to complete 6 week course, end date 2/7/23, needs weekly CBC,CMP.     Interval Problem Update  - afebrile  - POD#4 s/p I&P L  knee, cultures growing GBS  - having more pain, changed back to oxycodone  - wants to go back to [facility]      I have discussed this patient's plan of care and discharge plan at IDT rounds today with Case Management, Nursing, Nursing leadership, and other members of the IDT team.    Consultants/Specialty  infectious disease and orthopedics    Code Status  Full Code    Disposition  Patient is not medically cleared for discharge.   Anticipate discharge to  TBD .  I have placed the appropriate orders for post-discharge needs.    Review of Systems  Review of Systems   Constitutional:  Positive for malaise/fatigue. Negative for chills and fever.   HENT:  Positive for hearing loss.    Eyes:  Negative for redness.   Respiratory:  Negative for shortness of breath.    Cardiovascular:  Negative for chest pain.   Gastrointestinal:  Negative for constipation, diarrhea, nausea and vomiting.   Genitourinary:  Negative for dysuria.   Musculoskeletal:  Positive for joint pain.   Skin: Negative.    Neurological:  Positive for weakness. Negative for dizziness.   Psychiatric/Behavioral:  Negative for substance abuse.       Physical Exam  Temp:  [36.2 °C (97.2 °F)-37.7 °C (99.9 °F)] 37.7 °C (99.9 °F)  Pulse:  [62-67] 67  Resp:  [14-20] 16  BP: (107-128)/(61-70) 128/70  SpO2:  [93 %-95 %] 93 %    Physical Exam  Constitutional:       General: She is not in acute distress.     Appearance: She is not toxic-appearing or diaphoretic.   HENT:      Head: Normocephalic and atraumatic.      Nose: Nose normal.      Mouth/Throat:      Mouth: Mucous membranes are moist.   Eyes:      General: No scleral icterus.     Conjunctiva/sclera: Conjunctivae normal.   Cardiovascular:      Rate and Rhythm: Normal rate and regular rhythm.      Heart sounds: Murmur heard.     No friction rub. No gallop.   Pulmonary:      Effort: Pulmonary effort is normal.      Breath sounds: Normal breath sounds.   Abdominal:      General: Bowel sounds are normal. There is  no distension.      Palpations: Abdomen is soft.      Tenderness: There is no abdominal tenderness.   Musculoskeletal:      Cervical back: Normal range of motion.      Left knee: Tenderness present.      Right lower leg: Edema (ankles) present.      Left lower leg: Edema (ankles) present.      Comments: L knee wrapped, drain in place   Skin:     Findings: No erythema.      Comments: Skin graft scars b/l LEs   Neurological:      Mental Status: She is alert and oriented to person, place, and time. Mental status is at baseline.   Psychiatric:         Mood and Affect: Mood normal.         Behavior: Behavior normal.         Thought Content: Thought content normal.       Fluids    Intake/Output Summary (Last 24 hours) at 1/1/2023 1456  Last data filed at 1/1/2023 0400  Gross per 24 hour   Intake 360 ml   Output 900 ml   Net -540 ml         Laboratory  Recent Labs     12/30/22  0421 12/31/22  0801 01/01/23  0323   WBC 6.0 5.3 4.7*   RBC 2.57* 2.69* 2.60*   HEMOGLOBIN 7.3* 7.7* 7.4*   HEMATOCRIT 22.7* 24.3* 22.6*   MCV 88.3 90.3 86.9   MCH 28.4 28.6 28.5   MCHC 32.2* 31.7* 32.7*   RDW 54.1* 54.9* 52.0*   PLATELETCT 85* 99* 121*   MPV 12.0 9.5 10.0       Recent Labs     12/30/22  0421 12/31/22  0801 01/01/23  0323   SODIUM 132* 135 135   POTASSIUM 4.4 4.3 4.0   CHLORIDE 106 110 110   CO2 18* 17* 18*   GLUCOSE 118* 109* 100*   BUN 29* 20 15   CREATININE 1.23 0.76 0.69   CALCIUM 7.7* 8.2* 8.1*                     Imaging  DX-KNEE 2- LEFT   Final Result      Antibiotic impregnated radiopaque beads within and about the knee arthroplasty.      DX-SHOULDER 2+ RIGHT   Final Result      1.  Moderately advanced acromioclavicular degenerative changes.   2.  Acromiohumeral narrowing suggesting chronic rotator cuff tears.   3.  No acute fracture or dislocation.      DX-CHEST-PORTABLE (1 VIEW)   Final Result      1.  No acute cardiopulmonary abnormality.   2.  Prior granulomatous exposure.         DX-KNEE 3 VIEWS LEFT   Final Result       1.  Revised left knee arthroplasty is unchanged since prior. No evidence of hardware complication.   2.  No acute fracture or dislocation.   3.  Marked soft tissue swelling and suspected knee effusion.      MR-SHOULDER-W/O LEFT    (Results Pending)          Assessment/Plan  * Septic joint (HCC)- (present on admission)  Assessment & Plan  Patient has a history of group B strep bacteremia secondary to infection of tibial component of left arthroplasty hardware  Also with septic L shoulder  Status post antibiotic spacer placement by orthopedics (Dr. Luz)  Comes in with left knee pain and swelling and L shoulder pain  Arthrocentesis in the ER concerning for septic knee  Synovial cultures growing group B strep  Orthopedics consulted: I&D 12/28, cultures growing GBS  Infectious disease consulted: recommending Ancef x 6 weeks, end date 2/7/23, needs weekly labs CBC, CMP    Pain mgmt: worsening pain today, switched back to oxycodone  PT/OT  MRI Shoulder    Metabolic acidosis  Assessment & Plan  Normal AG, likely in setting of multiple BMs from lactulose  Improved with IVF      Acute encephalopathy  Assessment & Plan  Likely in setting of infection +/- hepatic encephalopathy  Ammonia normal  Treat infection  Decreased gabapentin  Improved today, thinking more clearly  Having multiple BMs    GERD (gastroesophageal reflux disease)  Assessment & Plan  Continue home omeprazole    Type 2 diabetes mellitus (HCC)  Assessment & Plan  Diet controlled  a1c 5.4  Dc SSI  Regular diet per patient request     Anemia of chronic disease  Assessment & Plan  H/H stable 10-->7-->7.3-->7.7  Iron studies low, will order iron replacement  CTM  Transfuse if hemoglobin < 7    Acute cystitis without hematuria- (present on admission)  Assessment & Plan  Patient c/o pain with urination and lower abd discomfort  UA c/f infection  ucx growing e.coli resistant to PCN  Switched from unasyn to CTX x 3 days    ELIZABETH (acute kidney injury) ([facility])  Assessment  & Plan  Improved with small bolus  Cr bumped 1.2  May have been from low BPs yesterday and n/v/d  CTM    Atrial fibrillation (HCC)- (present on admission)  Assessment & Plan  Continue home dronedarone 400 mg twice daily  Can likely restart eliquis today given stable H/H and increasing plt    Cirrhosis (HCC)- (present on admission)  Assessment & Plan  In setting of alcohol use however no EtOH in over a year  Continue home lactulose    Hypertension- (present on admission)  Assessment & Plan  Home regimen: Losartan 100 mg daily, felodipine 5 mg q. evening  Inpatient regimen: continue home meds  BP soft today, recheck H/H, holding losartan (also Cr bumped slightly)    Thrombocytopenia (HCC)- (present on admission)  Assessment & Plan  Likely in setting of cirrhosis  Holding Eliquis  Platelets stable/improving  Continue to monitor           VTE prophylaxis: SCDs/TEDs and therapeutic anticoagulation with eliquis    I have performed a physical exam and reviewed and updated ROS and Plan today (1/1/2023). In review of yesterday's note (12/31/2022), there are no changes except as documented above.

## 2023-01-02 ENCOUNTER — APPOINTMENT (OUTPATIENT)
Dept: RADIOLOGY | Facility: MEDICAL CENTER | Age: 63
DRG: 486 | End: 2023-01-02
Attending: INTERNAL MEDICINE
Payer: MEDICAID

## 2023-01-02 PROBLEM — E83.42 HYPOMAGNESEMIA: Status: ACTIVE | Noted: 2023-01-02

## 2023-01-02 LAB
ABO GROUP BLD: NORMAL
ALBUMIN SERPL BCP-MCNC: 2.1 G/DL (ref 3.2–4.9)
ALBUMIN/GLOB SERPL: 0.7 G/DL
ALP SERPL-CCNC: 164 U/L (ref 30–99)
ALT SERPL-CCNC: 13 U/L (ref 2–50)
ANION GAP SERPL CALC-SCNC: 7 MMOL/L (ref 7–16)
APPEARANCE FLD: NORMAL
AST SERPL-CCNC: 12 U/L (ref 12–45)
BARCODED ABORH UBTYP: 5100
BARCODED PRD CODE UBPRD: NORMAL
BARCODED UNIT NUM UBUNT: NORMAL
BASOPHILS # BLD AUTO: 1 % (ref 0–1.8)
BASOPHILS # BLD: 0.05 K/UL (ref 0–0.12)
BILIRUB SERPL-MCNC: 0.5 MG/DL (ref 0.1–1.5)
BLD GP AB SCN SERPL QL: NORMAL
BODY FLD TYPE: NORMAL
BUN SERPL-MCNC: 11 MG/DL (ref 8–22)
CALCIUM ALBUM COR SERPL-MCNC: 9.3 MG/DL (ref 8.5–10.5)
CALCIUM SERPL-MCNC: 7.8 MG/DL (ref 8.5–10.5)
CHLORIDE SERPL-SCNC: 110 MMOL/L (ref 96–112)
CO2 SERPL-SCNC: 18 MMOL/L (ref 20–33)
COLOR FLD: NORMAL
COMPONENT R 8504R: NORMAL
CREAT SERPL-MCNC: 0.79 MG/DL (ref 0.5–1.4)
CSF COMMENTS 1658: NORMAL
EOSINOPHIL # BLD AUTO: 0.11 K/UL (ref 0–0.51)
EOSINOPHIL NFR BLD: 2.3 % (ref 0–6.9)
EOSINOPHIL NFR FLD: 2 %
ERYTHROCYTE [DISTWIDTH] IN BLOOD BY AUTOMATED COUNT: 52.3 FL (ref 35.9–50)
GFR SERPLBLD CREATININE-BSD FMLA CKD-EPI: 84 ML/MIN/1.73 M 2
GLOBULIN SER CALC-MCNC: 2.9 G/DL (ref 1.9–3.5)
GLUCOSE SERPL-MCNC: 102 MG/DL (ref 65–99)
GRAM STN SPEC: NORMAL
HCT VFR BLD AUTO: 21.1 % (ref 37–47)
HGB BLD-MCNC: 6.9 G/DL (ref 12–16)
IMM GRANULOCYTES # BLD AUTO: 0.21 K/UL (ref 0–0.11)
IMM GRANULOCYTES NFR BLD AUTO: 4.3 % (ref 0–0.9)
LYMPHOCYTES # BLD AUTO: 1.18 K/UL (ref 1–4.8)
LYMPHOCYTES NFR BLD: 24.2 % (ref 22–41)
LYMPHOCYTES NFR FLD: 43 %
MAGNESIUM SERPL-MCNC: 1.4 MG/DL (ref 1.5–2.5)
MCH RBC QN AUTO: 28.8 PG (ref 27–33)
MCHC RBC AUTO-ENTMCNC: 32.7 G/DL (ref 33.6–35)
MCV RBC AUTO: 87.9 FL (ref 81.4–97.8)
MONOCYTES # BLD AUTO: 0.43 K/UL (ref 0–0.85)
MONOCYTES NFR BLD AUTO: 8.8 % (ref 0–13.4)
MONONUC CELLS NFR FLD: 12 %
NEUTROPHILS # BLD AUTO: 2.9 K/UL (ref 2–7.15)
NEUTROPHILS NFR BLD: 59.4 % (ref 44–72)
NEUTROPHILS NFR FLD: 43 %
NRBC # BLD AUTO: 0 K/UL
NRBC BLD-RTO: 0 /100 WBC
PLATELET # BLD AUTO: 123 K/UL (ref 164–446)
PMV BLD AUTO: 9.8 FL (ref 9–12.9)
POTASSIUM SERPL-SCNC: 3.9 MMOL/L (ref 3.6–5.5)
PRODUCT TYPE UPROD: NORMAL
PROT SERPL-MCNC: 5 G/DL (ref 6–8.2)
RBC # BLD AUTO: 2.4 M/UL (ref 4.2–5.4)
RBC # FLD: NORMAL CELLS/UL
RH BLD: NORMAL
SIGNIFICANT IND 70042: NORMAL
SITE SITE: NORMAL
SODIUM SERPL-SCNC: 135 MMOL/L (ref 135–145)
SOURCE SOURCE: NORMAL
UNIT STATUS USTAT: NORMAL
WBC # BLD AUTO: 4.9 K/UL (ref 4.8–10.8)
WBC # FLD: 1051 CELLS/UL

## 2023-01-02 PROCEDURE — 700102 HCHG RX REV CODE 250 W/ 637 OVERRIDE(OP): Performed by: STUDENT IN AN ORGANIZED HEALTH CARE EDUCATION/TRAINING PROGRAM

## 2023-01-02 PROCEDURE — 80053 COMPREHEN METABOLIC PANEL: CPT

## 2023-01-02 PROCEDURE — 700105 HCHG RX REV CODE 258: Performed by: INTERNAL MEDICINE

## 2023-01-02 PROCEDURE — 0R9K3ZX DRAINAGE OF LEFT SHOULDER JOINT, PERCUTANEOUS APPROACH, DIAGNOSTIC: ICD-10-PCS | Performed by: RADIOLOGY

## 2023-01-02 PROCEDURE — 89051 BODY FLUID CELL COUNT: CPT

## 2023-01-02 PROCEDURE — 87205 SMEAR GRAM STAIN: CPT

## 2023-01-02 PROCEDURE — 85025 COMPLETE CBC W/AUTO DIFF WBC: CPT

## 2023-01-02 PROCEDURE — 36415 COLL VENOUS BLD VENIPUNCTURE: CPT

## 2023-01-02 PROCEDURE — 700102 HCHG RX REV CODE 250 W/ 637 OVERRIDE(OP): Performed by: INTERNAL MEDICINE

## 2023-01-02 PROCEDURE — A9270 NON-COVERED ITEM OR SERVICE: HCPCS | Performed by: STUDENT IN AN ORGANIZED HEALTH CARE EDUCATION/TRAINING PROGRAM

## 2023-01-02 PROCEDURE — 20605 DRAIN/INJ JOINT/BURSA W/O US: CPT | Mod: LT

## 2023-01-02 PROCEDURE — 86900 BLOOD TYPING SEROLOGIC ABO: CPT

## 2023-01-02 PROCEDURE — 700111 HCHG RX REV CODE 636 W/ 250 OVERRIDE (IP): Performed by: INTERNAL MEDICINE

## 2023-01-02 PROCEDURE — 86923 COMPATIBILITY TEST ELECTRIC: CPT

## 2023-01-02 PROCEDURE — 36430 TRANSFUSION BLD/BLD COMPNT: CPT

## 2023-01-02 PROCEDURE — P9016 RBC LEUKOCYTES REDUCED: HCPCS

## 2023-01-02 PROCEDURE — 99233 SBSQ HOSP IP/OBS HIGH 50: CPT | Performed by: INTERNAL MEDICINE

## 2023-01-02 PROCEDURE — 87070 CULTURE OTHR SPECIMN AEROBIC: CPT

## 2023-01-02 PROCEDURE — A9270 NON-COVERED ITEM OR SERVICE: HCPCS | Performed by: INTERNAL MEDICINE

## 2023-01-02 PROCEDURE — 86850 RBC ANTIBODY SCREEN: CPT

## 2023-01-02 PROCEDURE — 770001 HCHG ROOM/CARE - MED/SURG/GYN PRIV*

## 2023-01-02 PROCEDURE — 83735 ASSAY OF MAGNESIUM: CPT

## 2023-01-02 PROCEDURE — 86901 BLOOD TYPING SEROLOGIC RH(D): CPT

## 2023-01-02 PROCEDURE — 30233N1 TRANSFUSION OF NONAUTOLOGOUS RED BLOOD CELLS INTO PERIPHERAL VEIN, PERCUTANEOUS APPROACH: ICD-10-PCS | Performed by: INTERNAL MEDICINE

## 2023-01-02 RX ORDER — SODIUM CHLORIDE 9 MG/ML
INJECTION, SOLUTION INTRAVENOUS CONTINUOUS
Status: ACTIVE | OUTPATIENT
Start: 2023-01-02 | End: 2023-01-02

## 2023-01-02 RX ORDER — MAGNESIUM SULFATE HEPTAHYDRATE 40 MG/ML
4 INJECTION, SOLUTION INTRAVENOUS ONCE
Status: COMPLETED | OUTPATIENT
Start: 2023-01-02 | End: 2023-01-02

## 2023-01-02 RX ADMIN — OXYCODONE HYDROCHLORIDE 10 MG: 10 TABLET ORAL at 08:03

## 2023-01-02 RX ADMIN — ATORVASTATIN CALCIUM 20 MG: 20 TABLET, FILM COATED ORAL at 20:58

## 2023-01-02 RX ADMIN — OXYCODONE HYDROCHLORIDE 10 MG: 10 TABLET ORAL at 03:03

## 2023-01-02 RX ADMIN — DRONEDARONE 400 MG: 400 TABLET, FILM COATED ORAL at 17:03

## 2023-01-02 RX ADMIN — Medication 220 MG: at 04:26

## 2023-01-02 RX ADMIN — SODIUM CHLORIDE 250 MG: 9 INJECTION, SOLUTION INTRAVENOUS at 04:30

## 2023-01-02 RX ADMIN — OMEPRAZOLE 20 MG: 20 CAPSULE, DELAYED RELEASE ORAL at 17:03

## 2023-01-02 RX ADMIN — DRONEDARONE 400 MG: 400 TABLET, FILM COATED ORAL at 08:02

## 2023-01-02 RX ADMIN — SODIUM CHLORIDE 250 MG: 9 INJECTION, SOLUTION INTRAVENOUS at 17:05

## 2023-01-02 RX ADMIN — MAGNESIUM SULFATE HEPTAHYDRATE 4 G: 40 INJECTION, SOLUTION INTRAVENOUS at 11:17

## 2023-01-02 RX ADMIN — OXYCODONE HYDROCHLORIDE 10 MG: 10 TABLET ORAL at 16:24

## 2023-01-02 RX ADMIN — OMEPRAZOLE 20 MG: 20 CAPSULE, DELAYED RELEASE ORAL at 04:26

## 2023-01-02 RX ADMIN — OXYCODONE HYDROCHLORIDE 10 MG: 10 TABLET ORAL at 12:21

## 2023-01-02 RX ADMIN — ERGOCALCIFEROL 50000 UNITS: 1.25 CAPSULE ORAL at 22:55

## 2023-01-02 RX ADMIN — APIXABAN 5 MG: 5 TABLET, FILM COATED ORAL at 04:26

## 2023-01-02 RX ADMIN — GABAPENTIN 200 MG: 100 CAPSULE ORAL at 17:03

## 2023-01-02 RX ADMIN — GABAPENTIN 200 MG: 100 CAPSULE ORAL at 04:25

## 2023-01-02 RX ADMIN — OXYCODONE HYDROCHLORIDE 10 MG: 10 TABLET ORAL at 20:59

## 2023-01-02 RX ADMIN — SODIUM CHLORIDE: 9 INJECTION, SOLUTION INTRAVENOUS at 09:15

## 2023-01-02 ASSESSMENT — LIFESTYLE VARIABLES: SUBSTANCE_ABUSE: 0

## 2023-01-02 ASSESSMENT — ENCOUNTER SYMPTOMS
DIZZINESS: 0
NAUSEA: 0
FEVER: 0
SHORTNESS OF BREATH: 0
DIARRHEA: 0
WEAKNESS: 1
VOMITING: 0
EYE REDNESS: 0
CONSTIPATION: 0
CHILLS: 0

## 2023-01-02 ASSESSMENT — PAIN DESCRIPTION - PAIN TYPE
TYPE: SURGICAL PAIN
TYPE: ACUTE PAIN
TYPE: ACUTE PAIN;SURGICAL PAIN
TYPE: SURGICAL PAIN
TYPE: ACUTE PAIN
TYPE: SURGICAL PAIN;ACUTE PAIN

## 2023-01-02 NOTE — CARE PLAN
The patient is Stable - Low risk of patient condition declining or worsening    Shift Goals  Clinical Goals: pain control  Patient Goals: pain control  Family Goals: N/A    Progress made toward(s) clinical / shift goals:  yes    Patient is not progressing towards the following goals: n/a      Problem: Knowledge Deficit - Standard  Goal: Patient and family/care givers will demonstrate understanding of plan of care, disease process/condition, diagnostic tests and medications  Description: Target End Date:  1-3 days or as soon as patient condition allows    Document in Patient Education    1.  Patient and family/caregiver oriented to unit, equipment, visitation policy and means for communicating concern  2.  Complete/review Learning Assessment  3.  Assess knowledge level of disease process/condition, treatment plan, diagnostic tests and medications  4.  Explain disease process/condition, treatment plan, diagnostic tests and medications  Outcome: Progressing     Problem: Pain - Standard  Goal: Alleviation of pain or a reduction in pain to the patient’s comfort goal  Description: Target End Date:  Prior to discharge or change in level of care    Document on Vitals flowsheet    1.  Document pain using the appropriate pain scale per order or unit policy  2.  Educate and implement non-pharmacologic comfort measures (i.e. relaxation, distraction, massage, cold/heat therapy, etc.)  3.  Pain management medications as ordered  4.  Reassess pain after pain med administration per policy  5.  If opiods administered assess patient's response to pain medication is appropriate per POSS sedation scale  6.  Follow pain management plan developed in collaboration with patient and interdisciplinary team (including palliative care or pain specialists if applicable)  Outcome: Progressing

## 2023-01-02 NOTE — DISCHARGE PLANNING
[facility] Transitional Care Coordination    Referral from: Dr Ortiz  Insurance Provider on Facesheet: Medicaid  Potential Rehab Diagnosis: Septic joint    Chart review indicates patient may have on going medical management and may have therapy needs to possibly meet inpatient rehab facility criteria with the goal of returning to community.    D/C support: Lives w/ s/o in 2nd floor apt, daughter lives nearby.     Physiatry consultation pended per protocol.     Septic knee - patient is currently max/total assist and will need to negotiate a flight of stairs to return home. Patient is not a candidate for IPR at this time, please reach out if patient's function improves or alternative dc disposition can be identified [phone].     Thank you for the referral.

## 2023-01-02 NOTE — CARE PLAN
The patient is Stable - Low risk of patient condition declining or worsening    Shift Goals  Clinical Goals: Pain control, safety  Patient Goals: Pain control  Family Goals: N/A    Progress made toward(s) clinical / shift goals:    Problem: Pain - Standard  Goal: Alleviation of pain or a reduction in pain to the patient’s comfort goal  Outcome: Progressing  Note: Patient states pain is relieved with newly changed pain medication regimen. Pt medicated per MAR. Pt repositioned and provided ice packs for comfort.      Problem: Skin Integrity  Goal: Skin integrity is maintained or improved  Outcome: Progressing  Note: Patient shows no signs of new or worsening skin integrity. Mepilex and KIMMY mattress in use

## 2023-01-02 NOTE — PROGRESS NOTES
Hospital Medicine Daily Progress Note    Date of Service  1/2/2023    Chief Complaint  April Alicia is a 62 y.o. female admitted 12/26/2022 with left knee pain    Hospital Course  Mrs. Alicia is our 62 year old female patient with a past medical history significant for cocaine and ETOH abuse, cirrhosis, asthma, atrial fibrillation on apixaban, diabetes mellitus, hypertension who presents to the hospital with chief complaint of increased pain in her LEFT knee associated with fevers and chills that began today. Patient describes difficulty ambulating even short distances due to the pain which is atypical for her. Denies any nausea, vomiting, tremors, falls, loss of consciousness, or recent trauma to the knee. Patient is not a reliable historian; tangential when answering to questions. History procurement substantiated by thorough chart review.     Patient with history of bilateral knee surgeries. In 7/22, was found to have group B streptococcal bacteremia secondary to infection of tibial component of LEFT arthroplasty hardware, at which point orthopedics was consulted, arthrocentesis performed which confirmed infection, underwent LEFT knee revision, total arthroplasty with antibiotic spacer placed. Plan was then to continue with CTX daily through 8/30/22. Followed with Dr. Gray (of [facility]).     Patient endorses she has not drank alcohol for weeks, and denies recent cocaine use.    In the ER the patient underwent arthrocentesis of the left knee which showed 21,150 WBCs, 86% PMNs.  Culture grew group B strep.  Orthopedics and ID were consulted. She went to surgery with Dr. Luz on 12/28/22.   Surgery cultures grew GBS.  She had symptoms of UTI and ucx grew E. Coli.  She was started on unasyn/vanc then switched to ceftriaxone x 3 days then switched to ancef to complete 6 week course, end date 2/7/23, needs weekly CBC,CMP.     Hospital course complicated by anemia requiring RBC  transfusion (1/2), ELIZABETH and metabolic acidosis which improved with IV fluids.  Patient also had left shoulder pain, MRI obtained which showed effusion.    Interval Problem Update  - afebrile  - POD#5 s/p I&P L knee, cultures growing GBS  - H/H dropped again, RBC transfusion today  -MRI shoulder performed which showed effusion, contacted Dr. Gray who is unavailable until tomorrow, recommended stat IR aspiration  -Patient reports she is eating better today and has her appetite back      I have discussed this patient's plan of care and discharge plan at IDT rounds today with Case Management, Nursing, Nursing leadership, and other members of the IDT team.    Consultants/Specialty  infectious disease and orthopedics    Code Status  Full Code    Disposition  Patient is not medically cleared for discharge.   Anticipate discharge to to an inpatient rehabilitation hospital versus SNF  I have placed the appropriate orders for post-discharge needs.    Review of Systems  Review of Systems   Constitutional:  Negative for chills and fever.   HENT:  Positive for hearing loss.    Eyes:  Negative for redness.   Respiratory:  Negative for shortness of breath.    Cardiovascular:  Negative for chest pain.   Gastrointestinal:  Negative for constipation, diarrhea, nausea and vomiting.   Genitourinary:  Negative for dysuria.   Musculoskeletal:  Positive for joint pain.   Skin: Negative.    Neurological:  Positive for weakness. Negative for dizziness.   Psychiatric/Behavioral:  Negative for substance abuse.       Physical Exam  Temp:  [36.3 °C (97.3 °F)-37.3 °C (99.2 °F)] 37.2 °C (99 °F)  Pulse:  [60-70] 63  Resp:  [14-20] 17  BP: (105-156)/(3-73) 112/61  SpO2:  [93 %-97 %] 95 %    Physical Exam  Constitutional:       General: She is not in acute distress.     Appearance: She is not toxic-appearing or diaphoretic.   HENT:      Head: Normocephalic and atraumatic.      Nose: Nose normal.      Mouth/Throat:      Mouth: Mucous membranes are  moist.   Eyes:      General: No scleral icterus.     Conjunctiva/sclera: Conjunctivae normal.   Cardiovascular:      Rate and Rhythm: Normal rate and regular rhythm.      Heart sounds: Murmur heard.     No friction rub. No gallop.   Pulmonary:      Effort: Pulmonary effort is normal.      Breath sounds: Normal breath sounds.   Abdominal:      General: Bowel sounds are normal. There is no distension.      Palpations: Abdomen is soft.      Tenderness: There is no abdominal tenderness.   Musculoskeletal:      Cervical back: Normal range of motion.      Left knee: Tenderness present.      Right lower leg: Edema (ankles) present.      Left lower leg: Edema (ankles) present.      Comments: L knee wrapped, drain in place   Skin:     Findings: No erythema.      Comments: Skin graft scars b/l LEs   Neurological:      Mental Status: She is alert and oriented to person, place, and time. Mental status is at baseline.   Psychiatric:         Mood and Affect: Mood normal.         Behavior: Behavior normal.         Thought Content: Thought content normal.       Fluids  No intake or output data in the 24 hours ending 01/02/23 1058      Laboratory  Recent Labs     12/31/22  0801 01/01/23  0323 01/02/23  0752   WBC 5.3 4.7* 4.9   RBC 2.69* 2.60* 2.40*   HEMOGLOBIN 7.7* 7.4* 6.9*   HEMATOCRIT 24.3* 22.6* 21.1*   MCV 90.3 86.9 87.9   MCH 28.6 28.5 28.8   MCHC 31.7* 32.7* 32.7*   RDW 54.9* 52.0* 52.3*   PLATELETCT 99* 121* 123*   MPV 9.5 10.0 9.8       Recent Labs     12/31/22  0801 01/01/23  0323 01/02/23  0752   SODIUM 135 135 135   POTASSIUM 4.3 4.0 3.9   CHLORIDE 110 110 110   CO2 17* 18* 18*   GLUCOSE 109* 100* 102*   BUN 20 15 11   CREATININE 0.76 0.69 0.79   CALCIUM 8.2* 8.1* 7.8*                     Imaging  MR-SHOULDER-W/O LEFT   Final Result      Diminished medium sized joint effusion communicates with the subacromial space and there are marginal erosions. This could indicate recurrent septic arthropathy and osteomyelitis but  the appearance is improved from comparison and it could also just be    degenerative      Severe glenohumeral cartilage loss with moderate osteophyte formation and evidence of prior posterior glenoid fracture, chronic posterior humeral head subluxation without dislocation      Posterior and superior glenoid labrum tears, chronic      Complete supraspinatus, subscapularis tears with fatty atrophy      Nonvisualization of the long head biceps intra-articular segment indicating likely chronic tear and retraction      Considerably degraded by motion artifact      DX-KNEE 2- LEFT   Final Result      Antibiotic impregnated radiopaque beads within and about the knee arthroplasty.      DX-SHOULDER 2+ RIGHT   Final Result      1.  Moderately advanced acromioclavicular degenerative changes.   2.  Acromiohumeral narrowing suggesting chronic rotator cuff tears.   3.  No acute fracture or dislocation.      DX-CHEST-PORTABLE (1 VIEW)   Final Result      1.  No acute cardiopulmonary abnormality.   2.  Prior granulomatous exposure.         DX-KNEE 3 VIEWS LEFT   Final Result      1.  Revised left knee arthroplasty is unchanged since prior. No evidence of hardware complication.   2.  No acute fracture or dislocation.   3.  Marked soft tissue swelling and suspected knee effusion.      IR-CONSULT AND TREAT    (Results Pending)          Assessment/Plan  * Septic joint (HCC)- (present on admission)  Assessment & Plan  Patient has a history of group B strep bacteremia secondary to infection of tibial component of left arthroplasty hardware  Also with septic L shoulder  Status post antibiotic spacer placement by orthopedics (Dr. Luz)  Comes in with left knee pain and swelling and L shoulder pain  Arthrocentesis in the ER concerning for septic knee  Synovial cultures growing group B strep  Orthopedics consulted: I&D 12/28, cultures growing GBS  Infectious disease consulted: recommending Ancef x 6 weeks, end date 2/7/23, needs weekly labs  CBC, CMP    Pain mgmt: worsening pain today, switched back to oxycodone  PT/OT  MRI Shoulder reviewed: Showed effusion, talked to Dr. Gray who recommended IR aspiration today    Metabolic acidosis  Assessment & Plan  Normal AG, likely in setting of multiple BMs from lactulose  Improved with IVF      Acute encephalopathy  Assessment & Plan  Likely in setting of infection +/- hepatic encephalopathy  Ammonia normal  Treat infection  Decreased gabapentin  Improved today, thinking more clearly  Having multiple BMs    GERD (gastroesophageal reflux disease)  Assessment & Plan  Continue home omeprazole    Type 2 diabetes mellitus (HCC)  Assessment & Plan  Diet controlled  a1c 5.4  Dc SSI  Regular diet per patient request     Anemia of chronic disease  Assessment & Plan  H/H stable 10-->7-->7.3-->7.7-->6.9  1 unit pRBC transfusion 1/2  Iron low, s/p iron transfusion  CTM  Transfuse if hemoglobin < 7    Acute cystitis without hematuria- (present on admission)  Assessment & Plan  Patient c/o pain with urination and lower abd discomfort  UA c/f infection  ucx growing e.coli resistant to PCN  Switched from unasyn to CTX x 3 days    ELIZABETH (acute kidney injury) (HCC)  Assessment & Plan  Improved with small bolus  Cr bumped 1.2  May have been from low BPs yesterday and n/v/d  CTM    Atrial fibrillation (HCC)- (present on admission)  Assessment & Plan  Continue home dronedarone 400 mg twice daily  Holding eliquis d/t anemia requiring transfusions    Cirrhosis (HCC)- (present on admission)  Assessment & Plan  In setting of alcohol use however no EtOH in over a year  Continue home lactulose    Hypertension- (present on admission)  Assessment & Plan  Home regimen: Losartan 100 mg daily, felodipine 5 mg q. evening  Inpatient regimen: continue home meds  BP soft, holding losartan (also Cr bumped slightly)    Thrombocytopenia (HCC)- (present on admission)  Assessment & Plan  Likely in setting of cirrhosis  Holding Eliquis  Platelets  stable/improving  Continue to monitor           VTE prophylaxis: SCDs/TEDs and pharmacologic prophylaxis contraindicated due to severe anemia    I have performed a physical exam and reviewed and updated ROS and Plan today (1/2/2023). In review of yesterday's note (1/1/2023), there are no changes except as documented above.

## 2023-01-03 LAB
ANION GAP SERPL CALC-SCNC: 8 MMOL/L (ref 7–16)
APPEARANCE UR: CLEAR
BACTERIA #/AREA URNS HPF: NEGATIVE /HPF
BILIRUB UR QL STRIP.AUTO: NEGATIVE
BUN SERPL-MCNC: 11 MG/DL (ref 8–22)
CALCIUM SERPL-MCNC: 7.8 MG/DL (ref 8.5–10.5)
CHLORIDE SERPL-SCNC: 109 MMOL/L (ref 96–112)
CO2 SERPL-SCNC: 17 MMOL/L (ref 20–33)
COLOR UR: YELLOW
CREAT SERPL-MCNC: 0.77 MG/DL (ref 0.5–1.4)
EKG IMPRESSION: NORMAL
EPI CELLS #/AREA URNS HPF: NEGATIVE /HPF
ERYTHROCYTE [DISTWIDTH] IN BLOOD BY AUTOMATED COUNT: 53.1 FL (ref 35.9–50)
GFR SERPLBLD CREATININE-BSD FMLA CKD-EPI: 87 ML/MIN/1.73 M 2
GLUCOSE SERPL-MCNC: 119 MG/DL (ref 65–99)
GLUCOSE UR STRIP.AUTO-MCNC: NEGATIVE MG/DL
HCT VFR BLD AUTO: 23.5 % (ref 37–47)
HGB BLD-MCNC: 7.6 G/DL (ref 12–16)
HYALINE CASTS #/AREA URNS LPF: ABNORMAL /LPF
KETONES UR STRIP.AUTO-MCNC: NEGATIVE MG/DL
LEUKOCYTE ESTERASE UR QL STRIP.AUTO: NEGATIVE
MAGNESIUM SERPL-MCNC: 1.9 MG/DL (ref 1.5–2.5)
MCH RBC QN AUTO: 28.7 PG (ref 27–33)
MCHC RBC AUTO-ENTMCNC: 32.3 G/DL (ref 33.6–35)
MCV RBC AUTO: 88.7 FL (ref 81.4–97.8)
MICRO URNS: ABNORMAL
NITRITE UR QL STRIP.AUTO: NEGATIVE
PH UR STRIP.AUTO: 5.5 [PH] (ref 5–8)
PLATELET # BLD AUTO: 130 K/UL (ref 164–446)
PMV BLD AUTO: 9.3 FL (ref 9–12.9)
POTASSIUM SERPL-SCNC: 4 MMOL/L (ref 3.6–5.5)
PROT UR QL STRIP: NEGATIVE MG/DL
RBC # BLD AUTO: 2.65 M/UL (ref 4.2–5.4)
RBC # URNS HPF: ABNORMAL /HPF
RBC UR QL AUTO: ABNORMAL
SODIUM SERPL-SCNC: 134 MMOL/L (ref 135–145)
SP GR UR STRIP.AUTO: 1.02
UROBILINOGEN UR STRIP.AUTO-MCNC: 0.2 MG/DL
WBC # BLD AUTO: 6.6 K/UL (ref 4.8–10.8)
WBC #/AREA URNS HPF: ABNORMAL /HPF

## 2023-01-03 PROCEDURE — 85027 COMPLETE CBC AUTOMATED: CPT

## 2023-01-03 PROCEDURE — 87040 BLOOD CULTURE FOR BACTERIA: CPT

## 2023-01-03 PROCEDURE — 700102 HCHG RX REV CODE 250 W/ 637 OVERRIDE(OP): Performed by: INTERNAL MEDICINE

## 2023-01-03 PROCEDURE — 83735 ASSAY OF MAGNESIUM: CPT

## 2023-01-03 PROCEDURE — A9270 NON-COVERED ITEM OR SERVICE: HCPCS | Performed by: STUDENT IN AN ORGANIZED HEALTH CARE EDUCATION/TRAINING PROGRAM

## 2023-01-03 PROCEDURE — 97530 THERAPEUTIC ACTIVITIES: CPT | Mod: CO

## 2023-01-03 PROCEDURE — 700111 HCHG RX REV CODE 636 W/ 250 OVERRIDE (IP): Performed by: HOSPITALIST

## 2023-01-03 PROCEDURE — 700111 HCHG RX REV CODE 636 W/ 250 OVERRIDE (IP): Performed by: INTERNAL MEDICINE

## 2023-01-03 PROCEDURE — 93005 ELECTROCARDIOGRAM TRACING: CPT | Performed by: HOSPITALIST

## 2023-01-03 PROCEDURE — 700102 HCHG RX REV CODE 250 W/ 637 OVERRIDE(OP): Performed by: STUDENT IN AN ORGANIZED HEALTH CARE EDUCATION/TRAINING PROGRAM

## 2023-01-03 PROCEDURE — 93010 ELECTROCARDIOGRAM REPORT: CPT | Performed by: INTERNAL MEDICINE

## 2023-01-03 PROCEDURE — 36415 COLL VENOUS BLD VENIPUNCTURE: CPT

## 2023-01-03 PROCEDURE — 80048 BASIC METABOLIC PNL TOTAL CA: CPT

## 2023-01-03 PROCEDURE — 81001 URINALYSIS AUTO W/SCOPE: CPT

## 2023-01-03 PROCEDURE — A9270 NON-COVERED ITEM OR SERVICE: HCPCS | Performed by: INTERNAL MEDICINE

## 2023-01-03 PROCEDURE — 700105 HCHG RX REV CODE 258: Performed by: HOSPITALIST

## 2023-01-03 PROCEDURE — 770001 HCHG ROOM/CARE - MED/SURG/GYN PRIV*

## 2023-01-03 PROCEDURE — 99233 SBSQ HOSP IP/OBS HIGH 50: CPT | Performed by: HOSPITALIST

## 2023-01-03 PROCEDURE — 700105 HCHG RX REV CODE 258: Performed by: INTERNAL MEDICINE

## 2023-01-03 PROCEDURE — 97535 SELF CARE MNGMENT TRAINING: CPT | Mod: CO

## 2023-01-03 RX ORDER — MAGNESIUM SULFATE 1 G/100ML
1 INJECTION INTRAVENOUS ONCE
Status: COMPLETED | OUTPATIENT
Start: 2023-01-03 | End: 2023-01-03

## 2023-01-03 RX ADMIN — ACETAMINOPHEN 650 MG: 325 TABLET ORAL at 02:45

## 2023-01-03 RX ADMIN — GABAPENTIN 200 MG: 100 CAPSULE ORAL at 04:55

## 2023-01-03 RX ADMIN — DRONEDARONE 400 MG: 400 TABLET, FILM COATED ORAL at 08:49

## 2023-01-03 RX ADMIN — DRONEDARONE 400 MG: 400 TABLET, FILM COATED ORAL at 16:47

## 2023-01-03 RX ADMIN — OMEPRAZOLE 20 MG: 20 CAPSULE, DELAYED RELEASE ORAL at 04:55

## 2023-01-03 RX ADMIN — LACTULOSE 15 ML: 20 SOLUTION ORAL at 13:07

## 2023-01-03 RX ADMIN — CEFAZOLIN 2 G: 2 INJECTION, POWDER, FOR SOLUTION INTRAMUSCULAR; INTRAVENOUS at 10:17

## 2023-01-03 RX ADMIN — ATORVASTATIN CALCIUM 20 MG: 20 TABLET, FILM COATED ORAL at 20:25

## 2023-01-03 RX ADMIN — Medication 220 MG: at 04:55

## 2023-01-03 RX ADMIN — MAGNESIUM SULFATE HEPTAHYDRATE 1 G: 1 INJECTION, SOLUTION INTRAVENOUS at 08:58

## 2023-01-03 RX ADMIN — SODIUM CHLORIDE 250 MG: 9 INJECTION, SOLUTION INTRAVENOUS at 04:55

## 2023-01-03 RX ADMIN — GABAPENTIN 200 MG: 100 CAPSULE ORAL at 16:47

## 2023-01-03 RX ADMIN — OXYCODONE HYDROCHLORIDE 10 MG: 10 TABLET ORAL at 05:00

## 2023-01-03 RX ADMIN — CEFAZOLIN 2 G: 2 INJECTION, POWDER, FOR SOLUTION INTRAMUSCULAR; INTRAVENOUS at 16:58

## 2023-01-03 RX ADMIN — OXYCODONE HYDROCHLORIDE 10 MG: 10 TABLET ORAL at 08:49

## 2023-01-03 RX ADMIN — OXYCODONE HYDROCHLORIDE 10 MG: 10 TABLET ORAL at 20:25

## 2023-01-03 RX ADMIN — OXYCODONE HYDROCHLORIDE 10 MG: 10 TABLET ORAL at 01:03

## 2023-01-03 RX ADMIN — ACETAMINOPHEN 650 MG: 325 TABLET ORAL at 13:06

## 2023-01-03 RX ADMIN — OXYCODONE HYDROCHLORIDE 10 MG: 10 TABLET ORAL at 13:01

## 2023-01-03 RX ADMIN — CEFAZOLIN 2 G: 2 INJECTION, POWDER, FOR SOLUTION INTRAMUSCULAR; INTRAVENOUS at 20:28

## 2023-01-03 RX ADMIN — OMEPRAZOLE 20 MG: 20 CAPSULE, DELAYED RELEASE ORAL at 16:47

## 2023-01-03 RX ADMIN — ACETAMINOPHEN 650 MG: 325 TABLET ORAL at 20:25

## 2023-01-03 RX ADMIN — DOCUSATE SODIUM 50 MG AND SENNOSIDES 8.6 MG 2 TABLET: 8.6; 5 TABLET, FILM COATED ORAL at 16:47

## 2023-01-03 ASSESSMENT — COGNITIVE AND FUNCTIONAL STATUS - GENERAL
TOILETING: A LOT
DRESSING REGULAR LOWER BODY CLOTHING: A LOT
SUGGESTED CMS G CODE MODIFIER DAILY ACTIVITY: CK
PERSONAL GROOMING: A LOT
DRESSING REGULAR UPPER BODY CLOTHING: A LITTLE
HELP NEEDED FOR BATHING: A LOT
DAILY ACTIVITIY SCORE: 15

## 2023-01-03 ASSESSMENT — PAIN DESCRIPTION - PAIN TYPE
TYPE: ACUTE PAIN
TYPE: ACUTE PAIN;SURGICAL PAIN

## 2023-01-03 ASSESSMENT — ENCOUNTER SYMPTOMS
FEVER: 0
CHILLS: 0
VOMITING: 0
EYE REDNESS: 0
DIZZINESS: 0
NAUSEA: 0
ABDOMINAL PAIN: 0
MYALGIAS: 1
SHORTNESS OF BREATH: 0
WEAKNESS: 1

## 2023-01-03 ASSESSMENT — LIFESTYLE VARIABLES: SUBSTANCE_ABUSE: 0

## 2023-01-03 ASSESSMENT — GAIT ASSESSMENTS: DISTANCE (FEET): 5

## 2023-01-03 NOTE — CARE PLAN
The patient is Stable - Low risk of patient condition declining or worsening    Shift Goals  Clinical Goals: Pain Management; Safety; Mobility; Stable Hemodynamics  Patient Goals: Pain Management; Comfort  Family Goals: N/A    Progress made toward(s) clinical / shift goals:    Problem: Knowledge Deficit - Standard  Goal: Patient and family/care givers will demonstrate understanding of plan of care, disease process/condition, diagnostic tests and medications  Outcome: Progressing   Plan of care discussed with patient, verbalizes understanding. Encouraged to voice feelings or concerns.     Problem: Pain - Standard  Goal: Alleviation of pain or a reduction in pain to the patient’s comfort goal  Outcome: Progressing   Available pain medications reviewed with patient. Pain managed by PRN and scheduled medications. Encouraged to call if pain is no longer managed.     Problem: Fall Risk  Goal: Patient will remain free from falls  Outcome: Progressing   Fall precautions in place. Patient rounded on hourly. Clutter-free environment maintained. Bed alarm on, bed locked and in lowest position. Call light in reach.     Problem: Skin Integrity  Goal: Skin integrity is maintained or improved  Outcome: Progressing

## 2023-01-03 NOTE — THERAPY
"Occupational Therapy  Daily Treatment     Patient Name: April Alicia  Age:  62 y.o., Sex:  female  Medical Record #: [id number]  Today's Date: 1/3/2023     Precautions  Precautions: Weight Bearing As Tolerated Left Lower Extremity, Immobilizer Left Lower Extremity, Prevena placed in LLE. Very [place], \"hearing aids at home. \"     Assessment    Pt was seen for OT treatment X2 today. In AM, pt refused stating her \"pain was an issue and will need pain meds before attempting  therapy. Pt requested OT \"come back later\".  This PM,  pt agreeable to attempt simple self care tasks seated EOB. Pt required Max A for supine to sit EOB with HOB elevated and use of bed rail . Max A for scooting forward to EOB. LLE needed to be completely assisted from bed to floor. Immobilizer on and pt did demo the ability to don in bed, using closures with Min A to don correctly. Pt demo Min A for UB dressing changes and Max A for LB dressing to doff and don socks and to don pants using AE. Mod A for clothing management in standing using FWW for support. Pt decided not to keep pants on due to the Pure Wick in place. Mod A to remove pants using AE.  Required 2 person Max A and FWW for transfers to chair at the bedside. Cues for hand placements. Pt can be slightly impulsive with transfers attempting to sit before completley in front of chair. Min A for H/G seated base post set up.  Pt gave good effort and is motivated in therapy. Pt was given a handout of where to purchase sock aid and reacher and reviewed with pt. Pt agreed to sit up in chair. Pt will need assist at home for most self care tasks at this time.  RN updated on OT treatment findings and recommendations .       Plan    Occupational Therapy Treatment Plan  O.T. Treatment Plan: (P) Continue Current Treatment Plan    DC Equipment Recommendations: (P) Unable to determine at this time  Discharge Recommendations: (P) Recommend post-acute placement for additional occupational therapy " "services prior to discharge home    Subjective    \"OK, I'll try to dress myself but I will need help\".      Objective       01/03/23 1411   Cognition    Cognition / Consciousness WDL   Level of Consciousness Alert   Safety Awareness Impaired   Comments very [place]   Passive ROM Upper Body   Passive ROM Upper Body X   Comments RUE can move up to 90 degrees of shoulder flexion before c/o pain. LUE limited throughout   Active ROM Upper Body   Active ROM Upper Body  X   Dominant Hand Right   Comments RUE can move up to 90 degrees of shoulder flexion before c/o pain. LUE limited throughout   Strength Upper Body   Upper Body Strength  X   Gross Strength Generalized Weakness, Equal Bilaterally.    Comments RUE slighty stronger than LUE   Other Treatments   Other Treatments Provided Psychosocial intervention addressed.   Balance   Sitting Balance (Static) Fair   Sitting Balance (Dynamic) Fair   Standing Balance (Static) Poor   Standing Balance (Dynamic) Poor -   Weight Shift Sitting Fair   Weight Shift Standing Poor   Comments with FWW   Bed Mobility    Supine to Sit Maximal Assist   Sit to Supine   (Pt left up in chair post OT session.)   Scooting Maximal Assist   Rolling Moderate Assist to Rt.;Moderate Assist to Lt.   Comments HOB elevated and bed raised up from floor for STS with FWW   Activities of Daily Living   Grooming Moderate Assist;Seated  (due to limited AROM BUE's)   Bathing   (declined , washed face only.)   Upper Body Dressing Minimal Assist   Lower Body Dressing Maximal Assist  (using AE seated EOB with extended time to complete.)   Toileting Maximal Assist   Skilled Intervention Verbal Cuing;Tactile Cuing;Sequencing;Compensatory Strategies   Comments Pt will need assist for most self care tasks at this time.   Functional Mobility   Sit to Stand Maximal Assist  (X2 using FWW)   Bed, Chair, Wheelchair Transfer Maximal Assist  (X2 persons and FWW)   Toilet Transfers   (did not attempt.)   Transfer Method Stand " Step   Comments with FWW and 2 person Max assist   Activity Tolerance   Comments limited by pain.   Patient / Family Goals   Patient / Family Goal #1 to return home   Goal #1 Outcome Progressing slower than expected   Short Term Goals   Short Term Goal # 1 supervised with UB dressing   Goal Outcome # 1 Progressing as expected   Short Term Goal # 2 min A with LB dressing   Goal Outcome # 2 Progressing slower than expected   Short Term Goal # 3 min A with ADL txfs   Goal Outcome # 3 Progressing slower than expected   Occupational Therapy Treatment Plan   O.T. Treatment Plan Continue Current Treatment Plan   Anticipated Discharge Equipment and Recommendations   DC Equipment Recommendations Unable to determine at this time   Discharge Recommendations Recommend post-acute placement for additional occupational therapy services prior to discharge home   Interdisciplinary Plan of Care Collaboration   IDT Collaboration with  Nursing   Collaboration Comments RN updated

## 2023-01-03 NOTE — PROGRESS NOTES
Hospital Medicine Daily Progress Note    Date of Service  1/3/2023    Chief Complaint  April Alicia is a 62 y.o. female admitted 12/26/2022 with left knee pain    Hospital Course      This is a 62-year-old female with a past medical history significant for cocaine abuse, ethanol abuse complicated by liver cirrhosis, asthma, atrial fibrillation, second hypercoagulable state on Eliquis, diabetes mellitus, hypertension presented to ER on 12/ planes of increased pain in her left knee associated with fever, chills.    She had hx of bilateral knee surgery In 7/22, was found to have group B streptococcal bacteremia secondary to infection of tibial component of LEFT arthroplasty hardware; she underwent LEFT knee revision, total arthroplasty with antibiotic spacer placed. Plan was then to continue with CTX daily through 8/30/22. Followed with Dr. Gray (of [facility]).    In ER, underwent arthrocentesis, WBC 21,150; culture growing group B strep orthopedic and ID were consulted, underwent surgery with Dr. Mcelroy on 12/20/2022 surgical culture growing group B strep.    She was started on broad-spectrum antibiotic including vancomycin and Unasyn later switched to Rocephin for 3 days for urinary tract infection.  ID was consulted, recommended ancef 7/2023.  While patient remains on IV antibiotics, need CBC, CMP, ESR, CRP.    Hospital course admitted with acute renal failure, which has been improving.  She received PRBC transfusion during the stay in the hospital.    Patient continued to complain of the pain in her left shoulder, MR showing Diminished medium sized joint effusion communicates with the subacromial space and there are marginal erosions. This could indicate recurrent septic arthropathy and osteomyelitis.  Ultrasound noted to have WBC 1051, RBC [id number].    Interval events:  -- Patient continued to remain febrile with a temperature as high as 101.8, blood cultures x2, chest x-ray,  UA ordered.  Patient continued to complain of the pain over shoulder worse on the left side  --Sodium is noted to be 134, bicarb of 17, hemoglobin 7.6, monitor, present 7, transfuse.    -- It is noted that patient has not been on any IV antibiotics within 48 hours, that why she has been having fever  -- will start patient on IV abx    Plan of been discussed with the patient, nursing staff, case management.  We will contact orthopedic surgery tomorrow    I have discussed this patient's plan of care and discharge plan at IDT rounds today with Case Management, Nursing, Nursing leadership, and other members of the IDT team.    Consultants/Specialty  infectious disease and orthopedics    Code Status  Full Code    Disposition  Patient is not medically cleared for discharge.   Anticipate discharge to to an inpatient rehabilitation hospital versus SNF  I have placed the appropriate orders for post-discharge needs.    Review of Systems  Review of Systems   Constitutional:  Positive for malaise/fatigue. Negative for chills and fever.   HENT:  Positive for hearing loss. Negative for congestion.    Eyes:  Negative for redness.   Respiratory:  Negative for shortness of breath.    Cardiovascular:  Negative for chest pain.   Gastrointestinal:  Negative for abdominal pain, nausea and vomiting.   Genitourinary:  Negative for dysuria.   Musculoskeletal:  Positive for joint pain and myalgias.   Skin: Negative.    Neurological:  Positive for weakness. Negative for dizziness.   Psychiatric/Behavioral:  Negative for substance abuse.    All other systems reviewed and are negative.     Physical Exam  Temp:  [36.4 °C (97.6 °F)-38.8 °C (101.8 °F)] 36.4 °C (97.6 °F)  Pulse:  [61-88] 83  Resp:  [16] 16  BP: (107-120)/(54-65) 110/65  SpO2:  [93 %-96 %] 96 %    Physical Exam  Vitals and nursing note reviewed.   Constitutional:       Appearance: Normal appearance.   HENT:      Head: Normocephalic and atraumatic.      Nose: Nose normal.   Eyes:       Extraocular Movements: Extraocular movements intact.      Conjunctiva/sclera: Conjunctivae normal.   Cardiovascular:      Rate and Rhythm: Normal rate and regular rhythm.      Heart sounds: Murmur heard.     No gallop.   Pulmonary:      Effort: Pulmonary effort is normal.      Breath sounds: Rales present.   Abdominal:      General: Bowel sounds are normal. There is no distension.      Palpations: Abdomen is soft.      Tenderness: There is no abdominal tenderness.   Musculoskeletal:      Cervical back: Neck supple.      Left knee: Tenderness present.      Right lower leg: Edema (ankles) present.      Left lower leg: Edema (ankles) present.      Comments: L knee wrapped, drain in place   Skin:     Findings: No erythema.      Comments: Skin graft scars b/l LEs   Neurological:      Mental Status: She is alert and oriented to person, place, and time. Mental status is at baseline.   Psychiatric:         Mood and Affect: Mood normal.         Behavior: Behavior normal.         Thought Content: Thought content normal.       Fluids    Intake/Output Summary (Last 24 hours) at 1/3/2023 1556  Last data filed at 1/3/2023 1047  Gross per 24 hour   Intake 753.04 ml   Output --   Net 753.04 ml         Laboratory  Recent Labs     01/01/23  0323 01/02/23  0752 01/03/23  0019   WBC 4.7* 4.9 6.6   RBC 2.60* 2.40* 2.65*   HEMOGLOBIN 7.4* 6.9* 7.6*   HEMATOCRIT 22.6* 21.1* 23.5*   MCV 86.9 87.9 88.7   MCH 28.5 28.8 28.7   MCHC 32.7* 32.7* 32.3*   RDW 52.0* 52.3* 53.1*   PLATELETCT 121* 123* 130*   MPV 10.0 9.8 9.3       Recent Labs     01/01/23  0323 01/02/23  0752 01/03/23  0019   SODIUM 135 135 134*   POTASSIUM 4.0 3.9 4.0   CHLORIDE 110 110 109   CO2 18* 18* 17*   GLUCOSE 100* 102* 119*   BUN 15 11 11   CREATININE 0.69 0.79 0.77   CALCIUM 8.1* 7.8* 7.8*                     Imaging  DX-INJECT OR ASPIRATE W/O US GUIDANCE-MED JOINT LEFT   Final Result      Successful fluoroscopically-guided aspiration of the left glenohumeral joint  with 5 mL bloody fluid sent to the lab.      MR-SHOULDER-W/O LEFT   Final Result      Diminished medium sized joint effusion communicates with the subacromial space and there are marginal erosions. This could indicate recurrent septic arthropathy and osteomyelitis but the appearance is improved from comparison and it could also just be    degenerative      Severe glenohumeral cartilage loss with moderate osteophyte formation and evidence of prior posterior glenoid fracture, chronic posterior humeral head subluxation without dislocation      Posterior and superior glenoid labrum tears, chronic      Complete supraspinatus, subscapularis tears with fatty atrophy      Nonvisualization of the long head biceps intra-articular segment indicating likely chronic tear and retraction      Considerably degraded by motion artifact      DX-KNEE 2- LEFT   Final Result      Antibiotic impregnated radiopaque beads within and about the knee arthroplasty.      DX-SHOULDER 2+ RIGHT   Final Result      1.  Moderately advanced acromioclavicular degenerative changes.   2.  Acromiohumeral narrowing suggesting chronic rotator cuff tears.   3.  No acute fracture or dislocation.      DX-CHEST-PORTABLE (1 VIEW)   Final Result      1.  No acute cardiopulmonary abnormality.   2.  Prior granulomatous exposure.         DX-KNEE 3 VIEWS LEFT   Final Result      1.  Revised left knee arthroplasty is unchanged since prior. No evidence of hardware complication.   2.  No acute fracture or dislocation.   3.  Marked soft tissue swelling and suspected knee effusion.             Assessment/Plan  * Septic joint (HCC)- (present on admission)  Assessment & Plan  Patient has a history of group B strep bacteremia secondary to infection of tibial component of left arthroplasty hardware  Also with septic L shoulder  Status post antibiotic spacer placement by orthopedics (Dr. Luz)  Comes in with left knee pain and swelling and L shoulder pain  Arthrocentesis in  the ER concerning for septic knee  Synovial cultures growing group B strep  Orthopedics consulted: I&D 12/28, cultures growing GBS  Infectious disease consulted: recommending Ancef x 6 weeks, end date 2/7/23, needs weekly labs CBC, CMP    Pain mgmt: worsening pain today, switched back to oxycodone  PT/OT  MRI Shoulder reviewed: Showed effusion, talked to Dr. Gray who recommended IR aspiration  On 1/2   --  Updated the result of IR aspiration     Hypomagnesemia  Assessment & Plan  Replete and monitor    Metabolic acidosis  Assessment & Plan  Normal AG, likely in setting of multiple BMs from lactulose  Improved with IVF      Hepatic encephalopathy  Assessment & Plan  Resolved    Acute encephalopathy  Assessment & Plan  Likely in setting of infection +/- hepatic encephalopathy  Ammonia normal  Treat infection  Decreased gabapentin   Resolved and patient in alert and orineted    GERD (gastroesophageal reflux disease)  Assessment & Plan  Continue home omeprazole    Type 2 diabetes mellitus (HCC)  Assessment & Plan  Diet controlled  a1c 5.4  Dc SSI  Regular diet per patient request     Anemia of chronic disease  Assessment & Plan  S/p PRBC transfusion x 1.  Iron studies consistent with iron deficiency, continue IV iron    Acute cystitis without hematuria- (present on admission)  Assessment & Plan  Patient c/o pain with urination and lower abd discomfort  UA c/f infection  ucx growing e.coli resistant to PCN  Switched from unasyn to CTX x 3 days    ELIZABETH (acute kidney injury) (HCC)  Assessment & Plan  Improving   Avoid nephrotoxin  Likely pre-renal in etiology    Atrial fibrillation (HCC)- (present on admission)  Assessment & Plan  Continue home dronedarone 400 mg twice daily  Holding eliquis d/t anemia requiring transfusions   -- Discussed adverse effect of holding Eliquis especially in the light of atrial fibrillation including stroke, patient states understanding    Cirrhosis (HCC)- (present on admission)  Assessment &  Plan  In setting of alcohol use however no EtOH in over a year  Continue home lactulose    Hypertension- (present on admission)  Assessment & Plan  Home regimen: Losartan 100 mg daily, felodipine 5 mg q. Evening  Hold as patient Bp is noted to eb on the lower side    Thrombocytopenia (HCC)- (present on admission)  Assessment & Plan  Likely in setting of cirrhosis  Holding Eliquis  Monitor         VTE prophylaxis: SCDs/TEDs and pharmacologic prophylaxis contraindicated due to severe anemia

## 2023-01-03 NOTE — DISCHARGE PLANNING
Case Management Discharge Planning    Admission Date: 12/26/2022  GMLOS: 1.7  ALOS: 8    6-Clicks ADL Score: 12  6-Clicks Mobility Score: 7  PT and/or OT Eval ordered: Yes  Post-acute Referrals Ordered: No  Post-acute Choice Obtained: No  Has referral(s) been sent to post-acute provider:  No      Anticipated Discharge Dispo: Discharge Disposition: Disch to  rehab facility or distinct part unit (62)    DME Needed: No    Action(s) Taken: MDR discussion, chart review, Temp 38.8 C at 3am, cultures pending, continued on IV antibiotics.     Escalations Completed: None    Medically Clear: No    Next Steps: Culture results.     Barriers to Discharge: Medical clearance    Is the patient up for discharge tomorrow: No

## 2023-01-04 LAB
ALBUMIN SERPL BCP-MCNC: 2.2 G/DL (ref 3.2–4.9)
BUN SERPL-MCNC: 13 MG/DL (ref 8–22)
CALCIUM ALBUM COR SERPL-MCNC: 9.2 MG/DL (ref 8.5–10.5)
CALCIUM SERPL-MCNC: 7.8 MG/DL (ref 8.5–10.5)
CHLORIDE SERPL-SCNC: 112 MMOL/L (ref 96–112)
CO2 SERPL-SCNC: 18 MMOL/L (ref 20–33)
CREAT SERPL-MCNC: 0.86 MG/DL (ref 0.5–1.4)
ERYTHROCYTE [DISTWIDTH] IN BLOOD BY AUTOMATED COUNT: 52.5 FL (ref 35.9–50)
GFR SERPLBLD CREATININE-BSD FMLA CKD-EPI: 76 ML/MIN/1.73 M 2
GLUCOSE SERPL-MCNC: 132 MG/DL (ref 65–99)
HCT VFR BLD AUTO: 22.7 % (ref 37–47)
HGB BLD-MCNC: 7.3 G/DL (ref 12–16)
MCH RBC QN AUTO: 28.5 PG (ref 27–33)
MCHC RBC AUTO-ENTMCNC: 32.2 G/DL (ref 33.6–35)
MCV RBC AUTO: 88.7 FL (ref 81.4–97.8)
PHOSPHATE SERPL-MCNC: 3.5 MG/DL (ref 2.5–4.5)
PLATELET # BLD AUTO: 117 K/UL (ref 164–446)
PMV BLD AUTO: 9.5 FL (ref 9–12.9)
POTASSIUM SERPL-SCNC: 4.1 MMOL/L (ref 3.6–5.5)
RBC # BLD AUTO: 2.56 M/UL (ref 4.2–5.4)
SODIUM SERPL-SCNC: 136 MMOL/L (ref 135–145)
TSH SERPL DL<=0.005 MIU/L-ACNC: 2.37 UIU/ML (ref 0.38–5.33)
VIT B12 SERPL-MCNC: 1213 PG/ML (ref 211–911)
WBC # BLD AUTO: 5.5 K/UL (ref 4.8–10.8)

## 2023-01-04 PROCEDURE — A9270 NON-COVERED ITEM OR SERVICE: HCPCS | Performed by: INTERNAL MEDICINE

## 2023-01-04 PROCEDURE — 700102 HCHG RX REV CODE 250 W/ 637 OVERRIDE(OP): Performed by: STUDENT IN AN ORGANIZED HEALTH CARE EDUCATION/TRAINING PROGRAM

## 2023-01-04 PROCEDURE — 80069 RENAL FUNCTION PANEL: CPT

## 2023-01-04 PROCEDURE — 36415 COLL VENOUS BLD VENIPUNCTURE: CPT

## 2023-01-04 PROCEDURE — A9270 NON-COVERED ITEM OR SERVICE: HCPCS | Performed by: STUDENT IN AN ORGANIZED HEALTH CARE EDUCATION/TRAINING PROGRAM

## 2023-01-04 PROCEDURE — 85027 COMPLETE CBC AUTOMATED: CPT

## 2023-01-04 PROCEDURE — 97530 THERAPEUTIC ACTIVITIES: CPT

## 2023-01-04 PROCEDURE — 700111 HCHG RX REV CODE 636 W/ 250 OVERRIDE (IP): Performed by: INTERNAL MEDICINE

## 2023-01-04 PROCEDURE — 82607 VITAMIN B-12: CPT

## 2023-01-04 PROCEDURE — 97116 GAIT TRAINING THERAPY: CPT

## 2023-01-04 PROCEDURE — 99233 SBSQ HOSP IP/OBS HIGH 50: CPT | Performed by: HOSPITALIST

## 2023-01-04 PROCEDURE — A9270 NON-COVERED ITEM OR SERVICE: HCPCS | Performed by: NURSE PRACTITIONER

## 2023-01-04 PROCEDURE — 770001 HCHG ROOM/CARE - MED/SURG/GYN PRIV*

## 2023-01-04 PROCEDURE — 700105 HCHG RX REV CODE 258: Performed by: HOSPITALIST

## 2023-01-04 PROCEDURE — 84443 ASSAY THYROID STIM HORMONE: CPT

## 2023-01-04 PROCEDURE — 700102 HCHG RX REV CODE 250 W/ 637 OVERRIDE(OP): Performed by: INTERNAL MEDICINE

## 2023-01-04 PROCEDURE — 700102 HCHG RX REV CODE 250 W/ 637 OVERRIDE(OP): Performed by: NURSE PRACTITIONER

## 2023-01-04 PROCEDURE — 700111 HCHG RX REV CODE 636 W/ 250 OVERRIDE (IP): Performed by: HOSPITALIST

## 2023-01-04 RX ORDER — ZINC SULFATE 50(220)MG
220 CAPSULE ORAL DAILY
Status: DISCONTINUED | OUTPATIENT
Start: 2023-01-05 | End: 2023-01-10 | Stop reason: HOSPADM

## 2023-01-04 RX ORDER — CALCIUM CARBONATE 500 MG/1
1000 TABLET, CHEWABLE ORAL 3 TIMES DAILY PRN
Status: DISCONTINUED | OUTPATIENT
Start: 2023-01-04 | End: 2023-01-10 | Stop reason: HOSPADM

## 2023-01-04 RX ADMIN — OXYCODONE HYDROCHLORIDE 10 MG: 10 TABLET ORAL at 13:47

## 2023-01-04 RX ADMIN — OXYCODONE HYDROCHLORIDE 10 MG: 10 TABLET ORAL at 09:56

## 2023-01-04 RX ADMIN — CALCIUM CARBONATE 1000 MG: 500 TABLET, CHEWABLE ORAL at 22:40

## 2023-01-04 RX ADMIN — OXYCODONE HYDROCHLORIDE 10 MG: 10 TABLET ORAL at 18:05

## 2023-01-04 RX ADMIN — DRONEDARONE 400 MG: 400 TABLET, FILM COATED ORAL at 17:17

## 2023-01-04 RX ADMIN — GABAPENTIN 200 MG: 100 CAPSULE ORAL at 04:10

## 2023-01-04 RX ADMIN — GABAPENTIN 200 MG: 100 CAPSULE ORAL at 17:17

## 2023-01-04 RX ADMIN — DOCUSATE SODIUM 50 MG AND SENNOSIDES 8.6 MG 2 TABLET: 8.6; 5 TABLET, FILM COATED ORAL at 17:17

## 2023-01-04 RX ADMIN — OMEPRAZOLE 20 MG: 20 CAPSULE, DELAYED RELEASE ORAL at 04:10

## 2023-01-04 RX ADMIN — CEFAZOLIN 2 G: 2 INJECTION, POWDER, FOR SOLUTION INTRAMUSCULAR; INTRAVENOUS at 04:12

## 2023-01-04 RX ADMIN — ATORVASTATIN CALCIUM 20 MG: 20 TABLET, FILM COATED ORAL at 21:14

## 2023-01-04 RX ADMIN — CEFAZOLIN 2 G: 2 INJECTION, POWDER, FOR SOLUTION INTRAMUSCULAR; INTRAVENOUS at 15:34

## 2023-01-04 RX ADMIN — Medication 220 MG: at 04:10

## 2023-01-04 RX ADMIN — OXYCODONE HYDROCHLORIDE 10 MG: 10 TABLET ORAL at 22:40

## 2023-01-04 RX ADMIN — OMEPRAZOLE 20 MG: 20 CAPSULE, DELAYED RELEASE ORAL at 17:17

## 2023-01-04 RX ADMIN — DRONEDARONE 400 MG: 400 TABLET, FILM COATED ORAL at 09:56

## 2023-01-04 RX ADMIN — ONDANSETRON 4 MG: 2 INJECTION INTRAMUSCULAR; INTRAVENOUS at 04:55

## 2023-01-04 RX ADMIN — DOCUSATE SODIUM 50 MG AND SENNOSIDES 8.6 MG 2 TABLET: 8.6; 5 TABLET, FILM COATED ORAL at 04:10

## 2023-01-04 RX ADMIN — LACTULOSE 15 ML: 20 SOLUTION ORAL at 15:33

## 2023-01-04 RX ADMIN — OXYCODONE HYDROCHLORIDE 10 MG: 10 TABLET ORAL at 03:19

## 2023-01-04 RX ADMIN — CEFAZOLIN 2 G: 2 INJECTION, POWDER, FOR SOLUTION INTRAMUSCULAR; INTRAVENOUS at 21:16

## 2023-01-04 ASSESSMENT — PAIN DESCRIPTION - PAIN TYPE
TYPE: ACUTE PAIN;SURGICAL PAIN
TYPE: ACUTE PAIN;SURGICAL PAIN
TYPE: ACUTE PAIN

## 2023-01-04 ASSESSMENT — ENCOUNTER SYMPTOMS
WEAKNESS: 1
ABDOMINAL PAIN: 0
EYE REDNESS: 0
FEVER: 0
NAUSEA: 0
MYALGIAS: 1
VOMITING: 0
CHILLS: 0
SHORTNESS OF BREATH: 0
DIZZINESS: 0

## 2023-01-04 ASSESSMENT — COGNITIVE AND FUNCTIONAL STATUS - GENERAL
CLIMB 3 TO 5 STEPS WITH RAILING: A LOT
WALKING IN HOSPITAL ROOM: A LITTLE
TURNING FROM BACK TO SIDE WHILE IN FLAT BAD: UNABLE
SUGGESTED CMS G CODE MODIFIER MOBILITY: CL
STANDING UP FROM CHAIR USING ARMS: A LOT
MOVING FROM LYING ON BACK TO SITTING ON SIDE OF FLAT BED: UNABLE
MOVING TO AND FROM BED TO CHAIR: UNABLE
MOBILITY SCORE: 10

## 2023-01-04 ASSESSMENT — GAIT ASSESSMENTS
DEVIATION: BRADYKINETIC;ANTALGIC
GAIT LEVEL OF ASSIST: MINIMAL ASSIST
ASSISTIVE DEVICE: FRONT WHEEL WALKER

## 2023-01-04 ASSESSMENT — LIFESTYLE VARIABLES: SUBSTANCE_ABUSE: 0

## 2023-01-04 NOTE — DOCUMENTATION QUERY
"                                                                         [facility]                                                                       Query Response Note      PATIENT:               FRANCISCA TURNER  ACCT #:                  [id number]  MRN:                     [id number]  :                      1960  ADMIT DATE:       2022 8:56 PM  DISCH DATE:          RESPONDING  PROVIDER #:        [id number]           QUERY TEXT:    Anemia is documented in the Medical Record. Please specify the underlying cause of the anemia.    NOTE:  If the appropriate response is not listed below, please respond with a new note.      The patient's Clinical Indicators include:  \"Hospital course complicated by anemia requiring RBC transfusion (), ELIZABETH and metabolic acidosis which improved with IV fluids\" is documented in the Progress Notes on . H/H: 9.8/30.7 on admission with a drop to 6.9/21.1 on .    Treatments include: Transfused 1u pRBC, LR Bolus, NS Bolus, Omeprazole, and Daily CBC.    Risk factors include: dx L-TKA Spacer Infection s/p Exchange, dx UTI, dx Acidosis, dx ELIZABETH, and hx Anemia.    Thank you,  Naveed Tompkins RN, BSN  Clinical   Connect via [employer]  Options provided:   -- Postoperative acute blood loss anemia   -- Blood loss anemia, acute, Please specify source of bleed, if known   -- Blood loss anemia, chronic-, Please specify source of bleed, if known   -- Other explanation, Please specify   -- Unable to determine      Query created by: Naveed Tompkins on 1/3/2023 6:50 AM    RESPONSE TEXT:    Postoperative acute blood loss anemia          Electronically signed by:  JAYLYN WILKES MD 1/3/2023 4:08 PM              "

## 2023-01-04 NOTE — DISCHARGE PLANNING
Case Management Discharge Planning    Admission Date: 12/26/2022  GMLOS: 1.7  ALOS: 9    6-Clicks ADL Score: 15  6-Clicks Mobility Score: 7  PT and/or OT Eval ordered: Yes  Post-acute Referrals Ordered: Yes  Post-acute Choice Obtained: Yes  Has referral(s) been sent to post-acute provider:  Yes      Anticipated Discharge Dispo: Discharge Disposition: D/T to Medicaid only Nursing home (64)    DME Needed: No    Action(s) Taken: Met with patient at bedside, discussed possible plan for discharge to snf tomorrow if stable. Patient was at [facility] and [facility] previously and does not want to return to [facility], okay with [facility] or [facility]. Might have Med[employer] as she thinks that she is charged monthly for this and applied for it last october.   CM spoke to [facility] who confirmed that there was no Med[employer]Care showing up under patients SS number. CM spoke to daughter Ava via phone, she will contact patient to discuss further and follow up with social security office regarding Med[employer]. CM will check in with patient and daughter later today.  SNF choices sent to [facility].   1pm: Patient confirmed that she does not have MediCare following her call to SS office. Is hoping for snf acceptance at [facility] as she was there previously.     Escalations Completed: None    Medically Clear: No    Next Steps: SNF acceptance, patient to confirm if MediCare in place.     Barriers to Discharge: Medical clearance    Is the patient up for discharge tomorrow: No

## 2023-01-04 NOTE — CARE PLAN
The patient is Watcher - Medium risk of patient condition declining or worsening    Shift Goals  Clinical Goals: Pain Management; Safety; Stable Vitals; Antibiotic Therapy  Patient Goals: Pain Management; Comfort  Family Goals: N/A    Progress made toward(s) clinical / shift goals:    Problem: Knowledge Deficit - Standard  Goal: Patient and family/care givers will demonstrate understanding of plan of care, disease process/condition, diagnostic tests and medications  Outcome: Progressing   Plan of care discussed with patient, verbalizes understanding. Encouraged to voice feelings or concerns.     Problem: Pain - Standard  Goal: Alleviation of pain or a reduction in pain to the patient’s comfort goal  Outcome: Progressing   Available pain medications reviewed with patient. Pain managed by PRN and scheduled medications. Encouraged to call if pain is no longer managed.     Problem: Fall Risk  Goal: Patient will remain free from falls  Outcome: Progressing   Fall precautions in place. Patient rounded on hourly. Clutter-free environment maintained. Bed alarm on, bed locked and in lowest position. Call light in reach.     Problem: Skin Integrity  Goal: Skin integrity is maintained or improved  Outcome: Progressing

## 2023-01-04 NOTE — PROGRESS NOTES
Hospital Medicine Daily Progress Note    Date of Service  1/4/2023    Chief Complaint  April Alicia is a 62 y.o. female admitted 12/26/2022 with left knee pain    Hospital Course      This is a 62-year-old female with a past medical history significant for cocaine abuse, ethanol abuse complicated by liver cirrhosis, asthma, atrial fibrillation, second hypercoagulable state on Eliquis, diabetes mellitus, hypertension presented to ER on 12/ planes of increased pain in her left knee associated with fever, chills.    She had hx of bilateral knee surgery In 7/22, was found to have group B streptococcal bacteremia secondary to infection of tibial component of LEFT arthroplasty hardware; she underwent LEFT knee revision, total arthroplasty with antibiotic spacer placed. Plan was then to continue with CTX daily through 8/30/22. Followed with Dr. Gray (of [facility]).    In ER, underwent arthrocentesis, WBC 21,150; culture growing group B strep orthopedic and ID were consulted, underwent surgery with Dr. Mcelroy on 12/20/2022 surgical culture growing group B strep.    She was started on broad-spectrum antibiotic including vancomycin and Unasyn later switched to Rocephin for 3 days for urinary tract infection.  ID was consulted, recommended ancef 7/2023.  While patient remains on IV antibiotics, need CBC, CMP, ESR, CRP.    Hospital course admitted with acute renal failure, which has been improving.  She received PRBC transfusion during the stay in the hospital.    Patient continued to complain of the pain in her left shoulder, MR showing Diminished medium sized joint effusion communicates with the subacromial space and there are marginal erosions. This could indicate recurrent septic arthropathy and osteomyelitis.  Ultrasound noted to have WBC 1051, RBC 124489.    Interval events:  -- Patient continued to remain febrile with a temperature as high as 101.8, blood cultures x2, chest x-ray,  UA ordered.  Patient continued to complain of the pain over shoulder worse on the left side  --Sodium is noted to be 134, bicarb of 17, hemoglobin 7.6, monitor, present 7, transfuse.    -- It is noted that patient has not been on any IV antibiotics within 48 hours, that why she has been having fever  -- will start patient on IV abx    Plan of been discussed with the patient, nursing staff, case management.  We will contact orthopedic surgery tomorrow    1/4:  1/4:  -- Patient continued to have fever, last fever on 1/3/2025 800.6, blood cultures x2 ordered, pending.  Continue IV antibiotics as per infectious disease recommendation.  --Arthrocentesis fluid from the left shoulder  joint reviewed, not infectious as per orthopedic surgery  --PT/OT has evaluate the patient medical postacute.  At this time patient not medically stable to discharge considering her having fever.  Do not provide Tylenol until patient spikes of temperature  -Hemoglobin is noted to be 7.3, monitor, this is considered iron deficiency, will provide IV iron.    I have discussed this patient's plan of care and discharge plan at IDT rounds today with Case Management, Nursing, Nursing leadership, and other members of the IDT team.    Consultants/Specialty  infectious disease and orthopedics    Code Status  Full Code    Disposition  Patient is not medically cleared for discharge.   Anticipate discharge to to an inpatient rehabilitation hospital versus SNF  I have placed the appropriate orders for post-discharge needs.    Review of Systems  Review of Systems   Constitutional:  Positive for malaise/fatigue. Negative for chills and fever.   HENT:  Positive for hearing loss. Negative for congestion.    Eyes:  Negative for redness.   Respiratory:  Negative for shortness of breath.    Cardiovascular:  Negative for chest pain.   Gastrointestinal:  Negative for abdominal pain, nausea and vomiting.   Genitourinary:  Negative for dysuria.   Musculoskeletal:   Positive for joint pain and myalgias.   Skin: Negative.    Neurological:  Positive for weakness. Negative for dizziness.   Psychiatric/Behavioral:  Negative for substance abuse.    All other systems reviewed and are negative.     Physical Exam  Temp:  [36.4 °C (97.6 °F)-38.6 °C (101.5 °F)] 37.2 °C (99 °F)  Pulse:  [56-83] 56  Resp:  [16] 16  BP: (109-113)/(58-65) 113/61  SpO2:  [95 %-97 %] 96 %    Physical Exam  Vitals and nursing note reviewed.   Constitutional:       Appearance: Normal appearance.   HENT:      Head: Normocephalic and atraumatic.      Nose: Nose normal.   Eyes:      Extraocular Movements: Extraocular movements intact.      Conjunctiva/sclera: Conjunctivae normal.   Cardiovascular:      Rate and Rhythm: Normal rate and regular rhythm.      Heart sounds: Murmur heard.     No gallop.   Pulmonary:      Effort: Pulmonary effort is normal.      Breath sounds: Rales present.   Abdominal:      General: Bowel sounds are normal. There is no distension.      Palpations: Abdomen is soft.      Tenderness: There is no abdominal tenderness.   Musculoskeletal:      Cervical back: Neck supple.      Left knee: Tenderness present.      Right lower leg: Edema (ankles) present.      Left lower leg: Edema (ankles) present.      Comments: L knee wrapped, drain in place   Skin:     Findings: No erythema.      Comments: Skin graft scars b/l LEs   Neurological:      Mental Status: She is alert and oriented to person, place, and time. Mental status is at baseline.   Psychiatric:         Mood and Affect: Mood normal.         Behavior: Behavior normal.         Thought Content: Thought content normal.       Fluids    Intake/Output Summary (Last 24 hours) at 1/4/2023 1218  Last data filed at 1/4/2023 0605  Gross per 24 hour   Intake 731.66 ml   Output 200 ml   Net 531.66 ml         Laboratory  Recent Labs     01/02/23  0752 01/03/23  0019 01/04/23  0257   WBC 4.9 6.6 5.5   RBC 2.40* 2.65* 2.56*   HEMOGLOBIN 6.9* 7.6* 7.3*    HEMATOCRIT 21.1* 23.5* 22.7*   MCV 87.9 88.7 88.7   MCH 28.8 28.7 28.5   MCHC 32.7* 32.3* 32.2*   RDW 52.3* 53.1* 52.5*   PLATELETCT 123* 130* 117*   MPV 9.8 9.3 9.5       Recent Labs     01/02/23  0752 01/03/23  0019 01/04/23  0257   SODIUM 135 134* 136   POTASSIUM 3.9 4.0 4.1   CHLORIDE 110 109 112   CO2 18* 17* 18*   GLUCOSE 102* 119* 132*   BUN 11 11 13   CREATININE 0.79 0.77 0.86   CALCIUM 7.8* 7.8* 7.8*                     Imaging  DX-INJECT OR ASPIRATE W/O US GUIDANCE-MED JOINT LEFT   Final Result      Successful fluoroscopically-guided aspiration of the left glenohumeral joint with 5 mL bloody fluid sent to the lab.      MR-SHOULDER-W/O LEFT   Final Result      Diminished medium sized joint effusion communicates with the subacromial space and there are marginal erosions. This could indicate recurrent septic arthropathy and osteomyelitis but the appearance is improved from comparison and it could also just be    degenerative      Severe glenohumeral cartilage loss with moderate osteophyte formation and evidence of prior posterior glenoid fracture, chronic posterior humeral head subluxation without dislocation      Posterior and superior glenoid labrum tears, chronic      Complete supraspinatus, subscapularis tears with fatty atrophy      Nonvisualization of the long head biceps intra-articular segment indicating likely chronic tear and retraction      Considerably degraded by motion artifact      DX-KNEE 2- LEFT   Final Result      Antibiotic impregnated radiopaque beads within and about the knee arthroplasty.      DX-SHOULDER 2+ RIGHT   Final Result      1.  Moderately advanced acromioclavicular degenerative changes.   2.  Acromiohumeral narrowing suggesting chronic rotator cuff tears.   3.  No acute fracture or dislocation.      DX-CHEST-PORTABLE (1 VIEW)   Final Result      1.  No acute cardiopulmonary abnormality.   2.  Prior granulomatous exposure.         DX-KNEE 3 VIEWS LEFT   Final Result      1.   Revised left knee arthroplasty is unchanged since prior. No evidence of hardware complication.   2.  No acute fracture or dislocation.   3.  Marked soft tissue swelling and suspected knee effusion.             Assessment/Plan  * Septic joint (HCC)- (present on admission)  Assessment & Plan  Patient has a history of group B strep bacteremia secondary to infection of tibial component of left arthroplasty hardware  Status post antibiotic spacer placement by orthopedics (Dr. Luz)  Comes in with left knee pain and swelling and L shoulder pain  Arthrocentesis in the ER concerning for septic knee  Synovial cultures growing group B strep  Orthopedics consulted: I&D 12/28, cultures growing GBS  Infectious disease consulted: recommending Ancef x 6 weeks, end date 2/7/23, needs weekly labs CBC, CMP    Pain mgmt: worsening pain today, switched back to oxycodone  PT/OT  MRI Shoulder reviewed: Showed effusion, talked to Dr. Gray who recommended IR aspiration  On 1/2   --  Updated the result of IR aspiration and less likely infectious, most likely osteoarthritis, no need for surgery    Hypomagnesemia  Assessment & Plan  Replete and monitor    Metabolic acidosis  Assessment & Plan  Normal AG, likely in setting of multiple BMs from lactulose  Improved with IVF      Hepatic encephalopathy  Assessment & Plan  Resolved    Acute encephalopathy  Assessment & Plan  Likely in setting of infection +/- hepatic encephalopathy  Ammonia normal  Treat infection  Decreased gabapentin   Resolved and patient in alert and orineted    GERD (gastroesophageal reflux disease)  Assessment & Plan  Continue home omeprazole  Obtain colton    Type 2 diabetes mellitus (HCC)  Assessment & Plan  Diet controlled  a1c 5.4  Dc SSI  Regular diet per patient request     Anemia of chronic disease  Assessment & Plan  S/p PRBC transfusion x 1.  Iron studies consistent with iron deficiency, continue IV iron    Acute cystitis without hematuria- (present on  admission)  Assessment & Plan  Patient c/o pain with urination and lower abd discomfort  UA c/f infection  ucx growing e.coli resistant to PCN  Switched from unasyn to CTX x 3 days    ELIZABETH (acute kidney injury) (HCC)  Assessment & Plan  Improving   Avoid nephrotoxin  Likely pre-renal in etiology    Atrial fibrillation (HCC)- (present on admission)  Assessment & Plan  Continue home dronedarone 400 mg twice daily  Holding eliquis d/t anemia requiring transfusions   -- Discussed adverse effect of holding Eliquis especially in the light of atrial fibrillation including stroke, patient states understanding    Cirrhosis (HCC)- (present on admission)  Assessment & Plan  In setting of alcohol use however no EtOH in over a year  Continue home lactulose    Hypertension- (present on admission)  Assessment & Plan  Home regimen: Losartan 100 mg daily, felodipine 5 mg q. Evening  Hold as patient Bp is noted to eb on the lower side    Thrombocytopenia (HCC)- (present on admission)  Assessment & Plan  Likely in setting of cirrhosis  Holding Eliquis  Monitor         VTE prophylaxis: SCDs/TEDs and pharmacologic prophylaxis contraindicated due to severe anemia

## 2023-01-04 NOTE — DISCHARGE PLANNING
Received Choice form at [phone]  Agency/Facility Name: [facility]  Referral sent per Choice form @ [phone]

## 2023-01-04 NOTE — CARE PLAN
The patient is Watcher - Medium risk of patient condition declining or worsening    Shift Goals  Clinical Goals: pain management, monitor and treat fever  Patient Goals: pain management  Family Goals: N/A    Progress made toward(s) clinical / shift goals:  increased mobility    Patient is not progressing towards the following goals: maintaining temp WNL      Problem: Pain - Standard  Goal: Alleviation of pain or a reduction in pain to the patient’s comfort goal  Description: Target End Date:  Prior to discharge or change in level of care    Document on Vitals flowsheet    1.  Document pain using the appropriate pain scale per order or unit policy  2.  Educate and implement non-pharmacologic comfort measures (i.e. relaxation, distraction, massage, cold/heat therapy, etc.)  3.  Pain management medications as ordered  4.  Reassess pain after pain med administration per policy  5.  If opiods administered assess patient's response to pain medication is appropriate per POSS sedation scale  6.  Follow pain management plan developed in collaboration with patient and interdisciplinary team (including palliative care or pain specialists if applicable)  Outcome: Progressing

## 2023-01-05 ENCOUNTER — APPOINTMENT (OUTPATIENT)
Dept: RADIOLOGY | Facility: MEDICAL CENTER | Age: 63
DRG: 486 | End: 2023-01-05
Attending: HOSPITALIST
Payer: MEDICAID

## 2023-01-05 LAB
ALBUMIN SERPL BCP-MCNC: 2.2 G/DL (ref 3.2–4.9)
ALBUMIN/GLOB SERPL: 0.6 G/DL
ALP SERPL-CCNC: 158 U/L (ref 30–99)
ALT SERPL-CCNC: 8 U/L (ref 2–50)
AMMONIA PLAS-SCNC: 52 UMOL/L (ref 11–45)
ANION GAP SERPL CALC-SCNC: 8 MMOL/L (ref 7–16)
APPEARANCE UR: CLEAR
AST SERPL-CCNC: 16 U/L (ref 12–45)
BACTERIA #/AREA URNS HPF: NEGATIVE /HPF
BACTERIA FLD AEROBE CULT: NORMAL
BILIRUB SERPL-MCNC: 0.5 MG/DL (ref 0.1–1.5)
BILIRUB UR QL STRIP.AUTO: NEGATIVE
BUN SERPL-MCNC: 13 MG/DL (ref 8–22)
CALCIUM ALBUM COR SERPL-MCNC: 9.3 MG/DL (ref 8.5–10.5)
CALCIUM SERPL-MCNC: 7.9 MG/DL (ref 8.5–10.5)
CHLORIDE SERPL-SCNC: 108 MMOL/L (ref 96–112)
CO2 SERPL-SCNC: 16 MMOL/L (ref 20–33)
COLOR UR: YELLOW
CREAT SERPL-MCNC: 0.74 MG/DL (ref 0.5–1.4)
EPI CELLS #/AREA URNS HPF: NEGATIVE /HPF
ERYTHROCYTE [DISTWIDTH] IN BLOOD BY AUTOMATED COUNT: 55.9 FL (ref 35.9–50)
FLUAV RNA SPEC QL NAA+PROBE: NEGATIVE
FLUBV RNA SPEC QL NAA+PROBE: NEGATIVE
GFR SERPLBLD CREATININE-BSD FMLA CKD-EPI: 91 ML/MIN/1.73 M 2
GLOBULIN SER CALC-MCNC: 3.4 G/DL (ref 1.9–3.5)
GLUCOSE SERPL-MCNC: 111 MG/DL (ref 65–99)
GLUCOSE UR STRIP.AUTO-MCNC: NEGATIVE MG/DL
GRAM STN SPEC: NORMAL
HCT VFR BLD AUTO: 23.2 % (ref 37–47)
HGB BLD-MCNC: 7.3 G/DL (ref 12–16)
HYALINE CASTS #/AREA URNS LPF: ABNORMAL /LPF
KETONES UR STRIP.AUTO-MCNC: NEGATIVE MG/DL
LEUKOCYTE ESTERASE UR QL STRIP.AUTO: NEGATIVE
MAGNESIUM SERPL-MCNC: 1.6 MG/DL (ref 1.5–2.5)
MCH RBC QN AUTO: 29 PG (ref 27–33)
MCHC RBC AUTO-ENTMCNC: 31.5 G/DL (ref 33.6–35)
MCV RBC AUTO: 92.1 FL (ref 81.4–97.8)
MICRO URNS: ABNORMAL
NITRITE UR QL STRIP.AUTO: NEGATIVE
PH UR STRIP.AUTO: 6.5 [PH] (ref 5–8)
PHOSPHATE SERPL-MCNC: 3 MG/DL (ref 2.5–4.5)
PLATELET # BLD AUTO: 116 K/UL (ref 164–446)
PMV BLD AUTO: 10.2 FL (ref 9–12.9)
POTASSIUM SERPL-SCNC: 4.1 MMOL/L (ref 3.6–5.5)
PROT SERPL-MCNC: 5.6 G/DL (ref 6–8.2)
PROT UR QL STRIP: NEGATIVE MG/DL
RBC # BLD AUTO: 2.52 M/UL (ref 4.2–5.4)
RBC # URNS HPF: ABNORMAL /HPF
RBC UR QL AUTO: ABNORMAL
RSV RNA SPEC QL NAA+PROBE: NEGATIVE
SARS-COV-2 RNA RESP QL NAA+PROBE: NOTDETECTED
SIGNIFICANT IND 70042: NORMAL
SITE SITE: NORMAL
SODIUM SERPL-SCNC: 132 MMOL/L (ref 135–145)
SOURCE SOURCE: NORMAL
SP GR UR STRIP.AUTO: 1.01
SPECIMEN SOURCE: NORMAL
UROBILINOGEN UR STRIP.AUTO-MCNC: 0.2 MG/DL
WBC # BLD AUTO: 5.2 K/UL (ref 4.8–10.8)
WBC #/AREA URNS HPF: ABNORMAL /HPF

## 2023-01-05 PROCEDURE — 700105 HCHG RX REV CODE 258: Performed by: HOSPITALIST

## 2023-01-05 PROCEDURE — 82140 ASSAY OF AMMONIA: CPT

## 2023-01-05 PROCEDURE — 80053 COMPREHEN METABOLIC PANEL: CPT

## 2023-01-05 PROCEDURE — 99233 SBSQ HOSP IP/OBS HIGH 50: CPT | Performed by: HOSPITALIST

## 2023-01-05 PROCEDURE — 71045 X-RAY EXAM CHEST 1 VIEW: CPT

## 2023-01-05 PROCEDURE — 99233 SBSQ HOSP IP/OBS HIGH 50: CPT | Performed by: INTERNAL MEDICINE

## 2023-01-05 PROCEDURE — 0241U HCHG SARS-COV-2 COVID-19 NFCT DS RESP RNA 4 TRGT MIC: CPT

## 2023-01-05 PROCEDURE — 770001 HCHG ROOM/CARE - MED/SURG/GYN PRIV*

## 2023-01-05 PROCEDURE — 700102 HCHG RX REV CODE 250 W/ 637 OVERRIDE(OP): Performed by: HOSPITALIST

## 2023-01-05 PROCEDURE — 83735 ASSAY OF MAGNESIUM: CPT

## 2023-01-05 PROCEDURE — A9270 NON-COVERED ITEM OR SERVICE: HCPCS | Performed by: HOSPITALIST

## 2023-01-05 PROCEDURE — 700102 HCHG RX REV CODE 250 W/ 637 OVERRIDE(OP): Performed by: NURSE PRACTITIONER

## 2023-01-05 PROCEDURE — A9270 NON-COVERED ITEM OR SERVICE: HCPCS | Performed by: NURSE PRACTITIONER

## 2023-01-05 PROCEDURE — 700102 HCHG RX REV CODE 250 W/ 637 OVERRIDE(OP): Performed by: INTERNAL MEDICINE

## 2023-01-05 PROCEDURE — 700111 HCHG RX REV CODE 636 W/ 250 OVERRIDE (IP): Performed by: HOSPITALIST

## 2023-01-05 PROCEDURE — 85027 COMPLETE CBC AUTOMATED: CPT

## 2023-01-05 PROCEDURE — 36415 COLL VENOUS BLD VENIPUNCTURE: CPT

## 2023-01-05 PROCEDURE — 84100 ASSAY OF PHOSPHORUS: CPT

## 2023-01-05 PROCEDURE — C9803 HOPD COVID-19 SPEC COLLECT: HCPCS | Performed by: HOSPITALIST

## 2023-01-05 PROCEDURE — 81001 URINALYSIS AUTO W/SCOPE: CPT

## 2023-01-05 PROCEDURE — A9270 NON-COVERED ITEM OR SERVICE: HCPCS | Performed by: INTERNAL MEDICINE

## 2023-01-05 PROCEDURE — 700102 HCHG RX REV CODE 250 W/ 637 OVERRIDE(OP): Performed by: STUDENT IN AN ORGANIZED HEALTH CARE EDUCATION/TRAINING PROGRAM

## 2023-01-05 PROCEDURE — A9270 NON-COVERED ITEM OR SERVICE: HCPCS | Performed by: STUDENT IN AN ORGANIZED HEALTH CARE EDUCATION/TRAINING PROGRAM

## 2023-01-05 PROCEDURE — 700111 HCHG RX REV CODE 636 W/ 250 OVERRIDE (IP): Performed by: INTERNAL MEDICINE

## 2023-01-05 RX ORDER — SODIUM BICARBONATE 650 MG/1
1300 TABLET ORAL 2 TIMES DAILY
Status: DISCONTINUED | OUTPATIENT
Start: 2023-01-05 | End: 2023-01-10 | Stop reason: HOSPADM

## 2023-01-05 RX ORDER — MAGNESIUM SULFATE HEPTAHYDRATE 40 MG/ML
2 INJECTION, SOLUTION INTRAVENOUS ONCE
Status: COMPLETED | OUTPATIENT
Start: 2023-01-05 | End: 2023-01-05

## 2023-01-05 RX ORDER — ENOXAPARIN SODIUM 100 MG/ML
40 INJECTION SUBCUTANEOUS DAILY
Status: DISCONTINUED | OUTPATIENT
Start: 2023-01-05 | End: 2023-01-10 | Stop reason: HOSPADM

## 2023-01-05 RX ADMIN — CALCIUM CARBONATE 1000 MG: 500 TABLET, CHEWABLE ORAL at 20:40

## 2023-01-05 RX ADMIN — Medication 220 MG: at 05:03

## 2023-01-05 RX ADMIN — DOCUSATE SODIUM 50 MG AND SENNOSIDES 8.6 MG 2 TABLET: 8.6; 5 TABLET, FILM COATED ORAL at 17:58

## 2023-01-05 RX ADMIN — SODIUM BICARBONATE 1300 MG: 650 TABLET ORAL at 12:34

## 2023-01-05 RX ADMIN — OXYCODONE HYDROCHLORIDE 10 MG: 10 TABLET ORAL at 08:22

## 2023-01-05 RX ADMIN — DOCUSATE SODIUM 50 MG AND SENNOSIDES 8.6 MG 2 TABLET: 8.6; 5 TABLET, FILM COATED ORAL at 05:03

## 2023-01-05 RX ADMIN — GABAPENTIN 200 MG: 100 CAPSULE ORAL at 05:03

## 2023-01-05 RX ADMIN — DRONEDARONE 400 MG: 400 TABLET, FILM COATED ORAL at 08:21

## 2023-01-05 RX ADMIN — OXYCODONE HYDROCHLORIDE 10 MG: 10 TABLET ORAL at 02:57

## 2023-01-05 RX ADMIN — OMEPRAZOLE 20 MG: 20 CAPSULE, DELAYED RELEASE ORAL at 17:58

## 2023-01-05 RX ADMIN — OXYCODONE HYDROCHLORIDE 10 MG: 10 TABLET ORAL at 23:28

## 2023-01-05 RX ADMIN — DRONEDARONE 400 MG: 400 TABLET, FILM COATED ORAL at 17:58

## 2023-01-05 RX ADMIN — ACETAMINOPHEN 650 MG: 325 TABLET ORAL at 20:40

## 2023-01-05 RX ADMIN — MAGNESIUM SULFATE HEPTAHYDRATE 2 G: 40 INJECTION, SOLUTION INTRAVENOUS at 08:23

## 2023-01-05 RX ADMIN — CEFTRIAXONE SODIUM 2000 MG: 10 INJECTION, POWDER, FOR SOLUTION INTRAVENOUS at 15:21

## 2023-01-05 RX ADMIN — OXYCODONE HYDROCHLORIDE 10 MG: 10 TABLET ORAL at 12:35

## 2023-01-05 RX ADMIN — OMEPRAZOLE 20 MG: 20 CAPSULE, DELAYED RELEASE ORAL at 05:03

## 2023-01-05 RX ADMIN — ACETAMINOPHEN 650 MG: 325 TABLET ORAL at 01:37

## 2023-01-05 RX ADMIN — LACTULOSE 15 ML: 20 SOLUTION ORAL at 15:21

## 2023-01-05 RX ADMIN — CALCIUM CARBONATE 1000 MG: 500 TABLET, CHEWABLE ORAL at 05:03

## 2023-01-05 RX ADMIN — ATORVASTATIN CALCIUM 20 MG: 20 TABLET, FILM COATED ORAL at 20:40

## 2023-01-05 RX ADMIN — CEFAZOLIN 2 G: 2 INJECTION, POWDER, FOR SOLUTION INTRAMUSCULAR; INTRAVENOUS at 05:25

## 2023-01-05 RX ADMIN — GABAPENTIN 200 MG: 100 CAPSULE ORAL at 17:58

## 2023-01-05 RX ADMIN — SODIUM BICARBONATE 1300 MG: 650 TABLET ORAL at 18:00

## 2023-01-05 RX ADMIN — ENOXAPARIN SODIUM 40 MG: 40 INJECTION SUBCUTANEOUS at 17:58

## 2023-01-05 RX ADMIN — OXYCODONE HYDROCHLORIDE 10 MG: 10 TABLET ORAL at 17:58

## 2023-01-05 RX ADMIN — NICOTINE 14 MG: 14 PATCH TRANSDERMAL at 05:02

## 2023-01-05 ASSESSMENT — LIFESTYLE VARIABLES: SUBSTANCE_ABUSE: 0

## 2023-01-05 ASSESSMENT — ENCOUNTER SYMPTOMS
CHILLS: 0
DIZZINESS: 0
SHORTNESS OF BREATH: 0
DIARRHEA: 0
NECK PAIN: 0
SPUTUM PRODUCTION: 0
COUGH: 0
ABDOMINAL PAIN: 0
MYALGIAS: 1
BACK PAIN: 0
CONSTIPATION: 0
NERVOUS/ANXIOUS: 0
NAUSEA: 0
VOMITING: 0
FEVER: 0
WEAKNESS: 1
EYE REDNESS: 0

## 2023-01-05 ASSESSMENT — PAIN DESCRIPTION - PAIN TYPE
TYPE: ACUTE PAIN
TYPE: ACUTE PAIN;SURGICAL PAIN

## 2023-01-05 NOTE — THERAPY
Physical Therapy   Daily Treatment     Patient Name: April Alicia  Age:  62 y.o., Sex:  female  Medical Record #: [id number]  Today's Date: 1/4/2023    Precautions: Weight Bearing As Tolerated Left Lower Extremity;Fall Risk;Immobilizer Left Lower Extremity    Assessment  Pt motivated to participate and demos significant improvement today. Pt able to initiate gait, ambulated 4ft x 10ft with FWW, min A for stability and cues for stepping pattern and posture. Encouraged continued up to chair with nursing to promote incr activity tolerance.     Plan  Continue Current Treatment Plan  DC Equipment Recommendations: Unable to determine at this time  Discharge Recommendations: Recommend post-acute placement for additional physical therapy services prior to discharge home     01/04/23 1411   Cognition    Level of Consciousness Alert   Comments [place], motivated. can be particular but is receptive to edu   Balance   Sitting Balance (Static) Fair +   Sitting Balance (Dynamic) Fair   Standing Balance (Static) Fair -   Standing Balance (Dynamic) Poor +   Weight Shift Sitting Fair   Weight Shift Standing Fair   Skilled Intervention Verbal Cuing;Tactile Cuing;Postural Facilitation   Comments standing with FWW   Bed Mobility    Supine to Sit Moderate Assist   Scooting Moderate Assist   Skilled Intervention Verbal Cuing;Sequencing   Gait Analysis   Gait Level Of Assist Minimal Assist   Assistive Device Front Wheel Walker   Distance (Feet) 4ft x 10ft   Deviation Bradykinetic;Antalgic   Weight Bearing Status WBAT LLE in KI   Skilled Intervention Verbal Cuing;Sequencing;Postural Facilitation   Comments able to initiate gait today, required rest break, improved gait mechanics and tolerance on second trial. needs min A for stability and intermittent VCs for sequencing.   Functional Mobility   Sit to Stand Moderate Assist   Bed, Chair, Wheelchair Transfer Moderate Assist   Skilled Intervention Verbal Cuing;Sequencing   Comments mod A  for lift off and controled descent to sitting, needs help at LLE.   Short Term Goals    Short Term Goal # 1 Pt to move supine to/from eob w/ spv in 6 visits to improve fxl indep   Goal Outcome # 1 goal not met   Short Term Goal # 2 Pt to move sit to/from stand w/ spv in 6 visits to improve fxl indep   Goal Outcome # 2 Goal not met   Short Term Goal # 3 Pt to ambulate 75 ft w/ fww and spv in 6 visits to improve fxl indep   Goal Outcome # 3 Goal not met

## 2023-01-05 NOTE — PROGRESS NOTES
Hospital Medicine Daily Progress Note    Date of Service  1/5/2023    Chief Complaint  April Alicia is a 62 y.o. female admitted 12/26/2022 with left knee pain    Hospital Course  This is a 62-year-old female with a past medical history significant for cocaine abuse, ethanol abuse complicated by liver cirrhosis, asthma, atrial fibrillation, second hypercoagulable state on Eliquis, diabetes mellitus, hypertension presented to ER on 12/ planes of increased pain in her left knee associated with fever, chills.    She had hx of bilateral knee surgery In 7/22, was found to have group B streptococcal bacteremia secondary to infection of tibial component of LEFT arthroplasty hardware; she underwent LEFT knee revision, total arthroplasty with antibiotic spacer placed. Plan was then to continue with CTX daily through 8/30/22. Followed with Dr. Gray (of [facility]).    In ER, underwent arthrocentesis, WBC 21,150; culture growing group B strep orthopedic and ID were consulted, underwent surgery with Dr. Mcelroy on 12/20/2022 surgical culture growing group B strep.    She was started on broad-spectrum antibiotic including vancomycin and Unasyn later switched to Rocephin for 3 days for urinary tract infection.  ID was consulted, recommended ancef 7/2023.  While patient remains on IV antibiotics, need CBC, CMP, ESR, CRP.    Hospital course admitted with acute renal failure, which has been improving.  She received PRBC transfusion during the stay in the hospital.    Patient continued to complain of the pain in her left shoulder, MR showing Diminished medium sized joint effusion communicates with the subacromial space and there are marginal erosions. This could indicate recurrent septic arthropathy and osteomyelitis.  Ultrasound noted to have WBC 1051, RBC [id number].    Interval events:  -- Patient continued to remain febrile with a temperature as high as 101.8, blood cultures x2, chest x-ray, UA  ordered.  Patient continued to complain of the pain over shoulder worse on the left side  --Sodium is noted to be 134, bicarb of 17, hemoglobin 7.6, monitor, present 7, transfuse.    -- It is noted that patient has not been on any IV antibiotics within 48 hours, that why she has been having fever  -- will start patient on IV abx    Plan of been discussed with the patient, nursing staff, case management.  We will contact orthopedic surgery tomorrow      1/4:  -- Patient continued to have fever, last fever on 1/3/2025 800.6, blood cultures x2 ordered, pending.  Continue IV antibiotics as per infectious disease recommendation.  --Arthrocentesis fluid from the left shoulder  joint reviewed, not infectious as per orthopedic surgery  --PT/OT has evaluate the patient medical postacute.  At this time patient not medically stable to discharge considering her having fever.  Do not provide Tylenol until patient spikes of temperature  -Hemoglobin is noted to be 7.3, monitor, this is considered iron deficiency, will provide IV iron.      1/5:  -- Went to the  bedside and evaluated the patient, evaluate the patient, limited insight including nursing note.  Patient is alert, awake, answering questions appropriately patient seen noted to be in better mood.  She reports that her shoulder pain has gotten significantly better.  We will continue to complain of the pain in her knee.  --Continues to have fever as high as 101.3, blood cultures x2, chest x-ray, UA ordered.  Influenza, RSV, COVID ordered  --Currently on Ancef, will continue    I have discussed this patient's plan of care and discharge plan at IDT rounds today with Case Management, Nursing, Nursing leadership, and other members of the IDT team.    Consultants/Specialty  infectious disease and orthopedics    Code Status  Full Code    Disposition  Patient is not medically cleared for discharge.   Anticipate discharge to to an inpatient rehabilitation hospital versus SNF  I  have placed the appropriate orders for post-discharge needs.    Review of Systems  Review of Systems   Constitutional:  Positive for malaise/fatigue. Negative for chills and fever.   HENT:  Negative for congestion and hearing loss.    Eyes:  Negative for redness.   Respiratory:  Negative for shortness of breath.    Cardiovascular:  Negative for chest pain.   Gastrointestinal:  Negative for abdominal pain, nausea and vomiting.   Genitourinary:  Negative for dysuria.   Musculoskeletal:  Positive for joint pain and myalgias.   Skin: Negative.    Neurological:  Positive for weakness. Negative for dizziness.   Psychiatric/Behavioral:  Negative for substance abuse.    All other systems reviewed and are negative.     Physical Exam  Temp:  [37.5 °C (99.5 °F)-38.5 °C (101.3 °F)] 38.1 °C (100.6 °F)  Pulse:  [52-61] 52  Resp:  [16] 16  BP: (101-107)/(55-61) 101/55  SpO2:  [96 %-97 %] 96 %    Physical Exam  Vitals and nursing note reviewed.   Constitutional:       Appearance: Normal appearance.   HENT:      Head: Normocephalic and atraumatic.   Eyes:      Extraocular Movements: Extraocular movements intact.   Cardiovascular:      Rate and Rhythm: Normal rate and regular rhythm.      Heart sounds: Murmur heard.   Pulmonary:      Effort: Pulmonary effort is normal.      Breath sounds: Rales present.   Abdominal:      General: Bowel sounds are normal. There is no distension.      Palpations: Abdomen is soft.      Tenderness: There is no abdominal tenderness.   Musculoskeletal:      Cervical back: Neck supple.      Left knee: Tenderness present.      Right lower leg: Edema (ankles) present.      Left lower leg: Edema (ankles) present.      Comments: L knee wrapped, drain in place   Skin:     Findings: No erythema.      Comments: Skin graft scars b/l LEs   Neurological:      Mental Status: She is alert and oriented to person, place, and time. Mental status is at baseline.      Cranial Nerves: No cranial nerve deficit.        Fluids    Intake/Output Summary (Last 24 hours) at 1/5/2023 0953  Last data filed at 1/4/2023 2000  Gross per 24 hour   Intake 240 ml   Output 1200 ml   Net -960 ml         Laboratory  Recent Labs     01/03/23 0019 01/04/23 0257 01/05/23  0518   WBC 6.6 5.5 5.2   RBC 2.65* 2.56* 2.52*   HEMOGLOBIN 7.6* 7.3* 7.3*   HEMATOCRIT 23.5* 22.7* 23.2*   MCV 88.7 88.7 92.1   MCH 28.7 28.5 29.0   MCHC 32.3* 32.2* 31.5*   RDW 53.1* 52.5* 55.9*   PLATELETCT 130* 117* 116*   MPV 9.3 9.5 10.2       Recent Labs     01/03/23 0019 01/04/23 0257 01/05/23 0518   SODIUM 134* 136 132*   POTASSIUM 4.0 4.1 4.1   CHLORIDE 109 112 108   CO2 17* 18* 16*   GLUCOSE 119* 132* 111*   BUN 11 13 13   CREATININE 0.77 0.86 0.74   CALCIUM 7.8* 7.8* 7.9*                     Imaging  DX-INJECT OR ASPIRATE W/O US GUIDANCE-MED JOINT LEFT   Final Result      Successful fluoroscopically-guided aspiration of the left glenohumeral joint with 5 mL bloody fluid sent to the lab.      MR-SHOULDER-W/O LEFT   Final Result      Diminished medium sized joint effusion communicates with the subacromial space and there are marginal erosions. This could indicate recurrent septic arthropathy and osteomyelitis but the appearance is improved from comparison and it could also just be    degenerative      Severe glenohumeral cartilage loss with moderate osteophyte formation and evidence of prior posterior glenoid fracture, chronic posterior humeral head subluxation without dislocation      Posterior and superior glenoid labrum tears, chronic      Complete supraspinatus, subscapularis tears with fatty atrophy      Nonvisualization of the long head biceps intra-articular segment indicating likely chronic tear and retraction      Considerably degraded by motion artifact      DX-KNEE 2- LEFT   Final Result      Antibiotic impregnated radiopaque beads within and about the knee arthroplasty.      DX-SHOULDER 2+ RIGHT   Final Result      1.  Moderately advanced  acromioclavicular degenerative changes.   2.  Acromiohumeral narrowing suggesting chronic rotator cuff tears.   3.  No acute fracture or dislocation.      DX-CHEST-PORTABLE (1 VIEW)   Final Result      1.  No acute cardiopulmonary abnormality.   2.  Prior granulomatous exposure.         DX-KNEE 3 VIEWS LEFT   Final Result      1.  Revised left knee arthroplasty is unchanged since prior. No evidence of hardware complication.   2.  No acute fracture or dislocation.   3.  Marked soft tissue swelling and suspected knee effusion.      DX-CHEST-LIMITED (1 VIEW)    (Results Pending)          Assessment/Plan  * Septic joint (HCC)- (present on admission)  Assessment & Plan  Patient has a history of group B strep bacteremia secondary to infection of tibial component of left arthroplasty hardware  Status post antibiotic spacer placement by orthopedics (Dr. Luz)   --Comes in with left knee pain and swelling and L shoulder pain   --Arthrocentesis in the ER concerning for septic knee; Orthopedics consulted: I&D 12/28, cultures growing GBS  Infectious disease consulted: recommending Ancef x 6 weeks, end date 2/7/23, needs weekly labs CBC, CMP  MR Shoulder effusion status post arthrocentesis, fluid analysis didn't show any infection    Hypomagnesemia  Assessment & Plan  Replete and monitor    Metabolic acidosis  Assessment & Plan  Normal AG, likely in setting of multiple BMs from lactulose  Improved with IVF      Hepatic encephalopathy  Assessment & Plan  Resolved    Acute encephalopathy  Assessment & Plan  Likely in setting of infection +/- hepatic encephalopathy  Ammonia normal  Treat infection  Decreased gabapentin   Resolved and patient in alert and orineted    GERD (gastroesophageal reflux disease)  Assessment & Plan  Continue home omeprazole  Obtain amlovely    Type 2 diabetes mellitus (HCC)  Assessment & Plan  Diet controlled  a1c 5.4  Dc SSI  Regular diet per patient request     Anemia of chronic disease  Assessment &  Plan  S/p PRBC transfusion x 1.  Iron studies consistent with iron deficiency, continue IV iron   -- hemoglobin at 7.3    Acute cystitis without hematuria- (present on admission)  Assessment & Plan  Patient c/o pain with urination and lower abd discomfort  UA c/f infection  ucx growing e.coli resistant to PCN  Switched from unasyn to CTX x 3 days; s/p completion of treatment    ELIZABETH (acute kidney injury) (HCC)  Assessment & Plan  Resolved    Atrial fibrillation (HCC)- (present on admission)  Assessment & Plan  Continue home dronedarone 400 mg twice daily  Holding eliquis d/t anemia requiring transfusions   -- Discussed adverse effect of holding Eliquis especially in the light of atrial fibrillation including stroke, patient states understanding    Cirrhosis (HCC)- (present on admission)  Assessment & Plan  In setting of alcohol use however no EtOH in over a year  Continue home lactulose    Hypertension- (present on admission)  Assessment & Plan  Home regimen: Losartan 100 mg daily, felodipine 5 mg q. Evening  Hold as patient Bp is noted to eb on the lower side    Thrombocytopenia (HCC)- (present on admission)  Assessment & Plan  Likely in setting of cirrhosis  Holding Eliquis  Monitor         VTE prophylaxis: lovenox  I have performed a physical exam and reviewed and updated ROS and Plan today (1/5/2023). In review of yesterday's note (1/4/2023), there are no changes except as documented above.

## 2023-01-05 NOTE — CONSULTS
"Reason for PC Consult: Advance Care Planning    Consulted by:   Dr. Somers    Assessment:  General:   62 year old female admitted for septic joint on 12/26/22. Pt has a history of chronic knee pain, cocaine use, EtOH use, Liver Cirrhosis, arthritis, asthma, diabetes, emphysema, remote rheumatic fever, heart valve disease, HTN, seizures, A-fib, chronic anticoagulation, left ear totally deaf, partially deaf in right (hearing aids getting fixed), vertigo, and falls. Pt presented to [facility] ED with worsening knee pain.    Prior PC Consult:   None on File    Social:   Pt lives with her daughter, Ava in [facility]. She dose visit her son, Vernon, often, usually 1 month in the summer and 1 major holiday every year. She is independent with ADLs, she does endorse either stand-by assistance or mild assist with showering. She does get rides from family.    Dyspnea: No, 93% on RA  Last BM: 01/04/23    Pain: Yes, Chronic pain management, medicated per MAR  Depression: Mood appropriate for situation   Dementia: No       Spiritual:  Is Mu-ism or spirituality important for coping with this illness? Yes, Pt declined visit from   Has a  or spiritual provider visit been requested? No    Palliative Performance Scale: 60%    Advance Directive: None on File   DPOA: None on File  POLST: None on File    To locate the AD/POLST, please hover the cursor over the patient's code status to find all linked ACP documents.    Code Status: Full    Outcome:  PC RN visited pt at bedside, introduced self and role of PC. Discussed pt's understanding of clinical picture, she verbalized good insight and understanding. Pt reports she has had issues with her knee and infections for a while. She is hopeful that she can get it resolved at some point. She is also pending a consult with a \"bone specialist\".     Discussed pending SNF for therapies, pt verbalized agreement. She states she has been getting OP therapies for her knees which she feels " "is going well.    Pt currently lives with her daughter, she will also travel to [place] to visit her son for a month over the summer and also for 1 major holiday. She reports being mostly independent with all ADLs, her daughter will provide standby assist with showering mainly due to her limited mobility in her arms. Pt also expressed concern regarding her social security checks, her most recent check was not as much as it should have been.       Advanced Care Planning Documents: Discussed advanced directive, addressed questions and concerns. Educated on the importance of completing ACP documents to appoint someone the patient trusts to be an advocate as well as establish healthcare preferences. POLST form discussed, code status discussed and educated on potential situation. Framed discussion within current clinical setting, explored pt's goals with CPR and discussed how interventions and treatments are intended to bridge pt into wellness.     Pt choose dtr Avaalexa Laureano as DPOA and 1st Alt son Jaime Laureano, statements of desires #2, 3, 5, & 6, living arrangements #1. AD awaiting notary.    POLST completed for DNR, selective treatment and trial period of feeding tubes \"only while I am medically improving\". POLST completed, will scan into EMR, original will be given back to pt.     To locate the AD/POLST, please hover the cursor over the patient's code status to find all linked ACP documents.    Active listening, education, validation, normalization, therapeutic touch, and emotional support provided throughout encounter.    Updated:   Dr. Somers    Plan:   Pt agreeable with DNAR/DNI, AD awaiting notary, POLST completed. Pending dc to SNF for therapies and 6 weeks antibiotics.     Thank you for allowing Palliative Care to participate in this patient's care. Please feel free to call [phone] with any questions or concerns.   "

## 2023-01-05 NOTE — DISCHARGE PLANNING
Case Management Discharge Planning    Admission Date: 12/26/2022    [facility] followed up SNF's for admission.

## 2023-01-05 NOTE — PROGRESS NOTES
Infectious Disease Progress Note    Author: Aurea Ramos M.D. Date & Time of service: 2023  11:15 AM    Chief Complaint:  Follow-up for prosthetic knee joint infection     Interval History:   T-max 100.9, white count 6000, antibiotic switched to ceftriaxone due to complaining dysuria and positive urine culture for Unasyn intermediate E. coli.  OR cultures positive as below    Review of Systems:  Review of Systems   Respiratory:  Negative for cough, sputum production and shortness of breath.    Cardiovascular:  Negative for chest pain.   Gastrointestinal:  Negative for abdominal pain, constipation, diarrhea, nausea and vomiting.   Genitourinary:  Positive for dysuria.   Musculoskeletal:  Positive for joint pain and myalgias. Negative for back pain and neck pain.   Neurological:  Positive for weakness.   Psychiatric/Behavioral:  The patient is not nervous/anxious.      Hemodynamics:  Temp (24hrs), Av.8 °C (100.1 °F), Min:37.1 °C (98.8 °F), Max:38.5 °C (101.3 °F)  Temperature: 37.1 °C (98.8 °F)  Pulse  Av.5  Min: 51  Max: 88   Blood Pressure: 102/53       Physical Exam:  Physical Exam  Cardiovascular:      Rate and Rhythm: Normal rate and regular rhythm.      Heart sounds: Normal heart sounds.   Pulmonary:      Effort: Pulmonary effort is normal.      Breath sounds: Normal breath sounds.   Abdominal:      General: Abdomen is flat. Bowel sounds are normal. There is no distension.      Palpations: Abdomen is soft.      Tenderness: There is no abdominal tenderness.   Musculoskeletal:      Right lower leg: Edema present.      Comments: Left knee in surgical bandages and brace   Skin:     General: Skin is warm and dry.   Neurological:      General: No focal deficit present.      Mental Status: She is oriented to person, place, and time.   Psychiatric:         Mood and Affect: Mood normal.         Behavior: Behavior normal.       Meds:    Current Facility-Administered Medications:     sodium  bicarbonate    enoxaparin (LOVENOX) injection    zinc sulfate    calcium carbonate    ceFAZolin    oxyCODONE immediate-release **OR** oxyCODONE immediate-release    lactulose    ondansetron    albuterol    atorvastatin    dronedarone    omeprazole    vitamin D2 (Ergocalciferol)    Pharmacy Consult Request    gabapentin    senna-docusate **AND** polyethylene glycol/lytes **AND** magnesium hydroxide **AND** bisacodyl    acetaminophen    nicotine **AND** Nicotine Replacement Patient Education Materials **AND** nicotine polacrilex    Labs:  Recent Labs     01/02/23  1545 01/03/23  0019 01/04/23  0257 01/05/23  0518   WBC  --  6.6 5.5 5.2   RBC  --  2.65* 2.56* 2.52*   HEMOGLOBIN  --  7.6* 7.3* 7.3*   HEMATOCRIT  --  23.5* 22.7* 23.2*   MCV  --  88.7 88.7 92.1   MCH  --  28.7 28.5 29.0   RDW  --  53.1* 52.5* 55.9*   PLATELETCT  --  130* 117* 116*   MPV  --  9.3 9.5 10.2   EOSINOPHILS 2  --   --   --      Recent Labs     01/03/23  0019 01/04/23 0257 01/05/23  0518   SODIUM 134* 136 132*   POTASSIUM 4.0 4.1 4.1   CHLORIDE 109 112 108   CO2 17* 18* 16*   GLUCOSE 119* 132* 111*   BUN 11 13 13     Recent Labs     01/03/23  0019 01/04/23 0257 01/05/23  0518   ALBUMIN  --  2.2* 2.2*   TBILIRUBIN  --   --  0.5   ALKPHOSPHAT  --   --  158*   TOTPROTEIN  --   --  5.6*   ALTSGPT  --   --  8   ASTSGOT  --   --  16   CREATININE 0.77 0.86 0.74       Imaging:  DX-CHEST-LIMITED (1 VIEW)    Result Date: 1/5/2023 1/5/2023 10:08 AM HISTORY/REASON FOR EXAM:  Shortness of Breath TECHNIQUE/EXAM DESCRIPTION AND NUMBER OF VIEWS: Single portable view of the chest. COMPARISON: 12/26/2022 FINDINGS: Cardiac mediastinal contour is unchanged. Lungs show hypoinflation. Calcified RIGHT hilar lymph nodes. No focal consolidation. No pleural fluid collection or pneumothorax. No major bony abnormality is seen.     1.  Hypoinflation without other evidence for acute cardiopulmonary disease. 2.  Evidence of old granulomatous disease.    DX-CHEST-PORTABLE  (1 VIEW)    Result Date: 12/26/2022 12/26/2022 9:38 PM HISTORY/REASON FOR EXAM:  Sepsis. TECHNIQUE/EXAM DESCRIPTION AND NUMBER OF VIEWS: Single portable view of the chest. COMPARISON:  7/20/2022. FINDINGS: LUNGS: Clear. No effusions. PNEUMOTHORAX: None. LINES AND TUBES: None. MEDIASTINUM: No cardiomegaly. Atherosclerosis. Several mediastinal granulomas. MUSCULOSKELETAL STRUCTURES: No acute displaced fracture.     1.  No acute cardiopulmonary abnormality. 2.  Prior granulomatous exposure.     DX-INJECT OR ASPIRATE W/O US GUIDANCE-MED JOINT LEFT    Result Date: 1/2/2023 1/2/2023 4:16 PM HISTORY/REASON FOR EXAM:  Pain. Joint effusion TECHNIQUE/EXAM DESCRIPTION: Fluoroscopic guidance used for left glenohumeral joint aspiration.  Total fluoroscopy time was 0.3 minutes The joint was localized using fluoroscopic guidance for injection after obtaining informed consent. A timeout was taken. After sterile preparation and drape procedures were followed, local anesthesia was achieved with 1% lidocaine.  A 20-gauge spinal needle was advanced into the joint. Approximately 5 mL bloody fluid was aspirated. Total fluoroscopic images: 1 fluoroscopic images obtained. Fluoroscopy dose(DAP): 0.107 Gy*cm^2     Successful fluoroscopically-guided aspiration of the left glenohumeral joint with 5 mL bloody fluid sent to the lab.    DX-KNEE 2- LEFT    Result Date: 12/28/2022 12/28/2022 4:07 PM HISTORY/REASON FOR EXAM: . Knee arthroplasty TECHNIQUE/EXAM DESCRIPTION AND NUMBER OF VIEWS:  2 views of the LEFT knee. COMPARISON: 2/26/2022 FINDINGS: Knee arthroplasty is again noted. Interval placement of radiopaque presumed antibiotic impregnated beads within and around the knee. There is soft tissue gas. Soft tissue swelling and presumed joint effusion is likely improved..     Antibiotic impregnated radiopaque beads within and about the knee arthroplasty.    DX-KNEE 3 VIEWS LEFT    Result Date: 12/26/2022 12/26/2022 9:38 PM HISTORY/REASON FOR  EXAM:  Left knee pain TECHNIQUE/EXAM DESCRIPTION AND NUMBER OF VIEWS:  3 views of the LEFT knee. COMPARISON: 10/27/2022 FINDINGS: BONE MINERALIZATION: Normal. JOINTS: Suspected knee effusion. Revised left knee arthroplasty is unchanged. No evidence of hardware complication. FRACTURE: None. DISLOCATION: None. SOFT TISSUES: Marked soft tissue swelling. Atherosclerosis.     1.  Revised left knee arthroplasty is unchanged since prior. No evidence of hardware complication. 2.  No acute fracture or dislocation. 3.  Marked soft tissue swelling and suspected knee effusion.    DX-SHOULDER 2+ RIGHT    Result Date: 12/28/2022 12/28/2022 9:43 AM HISTORY/REASON FOR EXAM:  Pain/Deformity Following Trauma. TECHNIQUE/EXAM DESCRIPTION AND NUMBER OF VIEWS:  3 views of the RIGHT shoulder. COMPARISON: None FINDINGS: There is no fracture or dislocation. Narrowing of the acromiohumeral interval. At least moderate degenerative changes of the acromioclavicular joint, mild of the glenohumeral joint. Bone mineralization is decreased..     1.  Moderately advanced acromioclavicular degenerative changes. 2.  Acromiohumeral narrowing suggesting chronic rotator cuff tears. 3.  No acute fracture or dislocation.    MR-SHOULDER-W/O LEFT    Result Date: 1/1/2023 1/1/2023 3:44 PM HISTORY/REASON FOR EXAM: TECHNIQUE/EXAM DESCRIPTION: MRI of the LEFT shoulder without contrast. The study was performed on a [employer] 1.5 Suzanne MRI scanner. T1 sagittal, fast spin-echo T2 fat-suppressed oblique coronal, sagittal, and axial and intermediate fast spin-echo oblique coronal images were obtained. COMPARISON: 7/16/2022 FINDINGS: There is severe motion artifact Glenohumeral joint space: There is a medium sized joint effusion. This represents improvement.  This communicates with the subdeltoid space. Rotator cuff: Complete supraspinatus tendon tear with retraction and fatty atrophy. Complete subscapularis tear with retraction and fatty atrophy. Infraspinatus and  subscapularis do not demonstrate fatty atrophy. No gross tear is seen but there is edema involving the muscle bellies Labrum: The superior and posterior labrum is heterogeneous with increased signal and some volume loss. The long head biceps is seen below the bicipital groove. It is not confirmed above. Osseous structures/cartilage: Severe cartilage loss with probable articulation areas of subchondral edema. There are erosions which are marginal. Again the humeral head is dorsally subluxed with some dorsal glenoid spurring. A chronic posterior glenoid fracture is likely. Humerus is high riding. There appears to have been prior acromioplasty and Shania procedures favored over chronic grade 2 separation.     Diminished medium sized joint effusion communicates with the subacromial space and there are marginal erosions. This could indicate recurrent septic arthropathy and osteomyelitis but the appearance is improved from comparison and it could also just be degenerative Severe glenohumeral cartilage loss with moderate osteophyte formation and evidence of prior posterior glenoid fracture, chronic posterior humeral head subluxation without dislocation Posterior and superior glenoid labrum tears, chronic Complete supraspinatus, subscapularis tears with fatty atrophy Nonvisualization of the long head biceps intra-articular segment indicating likely chronic tear and retraction Considerably degraded by motion artifact      Micro:  Results       Procedure Component Value Units Date/Time    URINALYSIS [[id number]]     Order Status: No result Specimen: Urine, Clean Catch     FLUID CULTURE W/GRAM STAIN [[id number]] Collected: 01/02/23 1545    Order Status: Completed Specimen: Synovial Updated: 01/05/23 0945     Significant Indicator NEG     Source SYNO     Site Left Shoulder     Culture Result No growth at 72 hours.     Gram Stain Result Rare WBCs.  No organisms seen.      COV-2, FLU A/B, AND RSV BY PCR (2-4 HOURS CEPHEID):  "Collect NP swab in [facility] [[id number]]     Order Status: No result Specimen: Respirate from Nasopharyngeal     BLOOD CULTURE [[id number]] Collected: 01/03/23 0842    Order Status: Completed Specimen: Blood from Peripheral Updated: 01/04/23 0728     Significant Indicator NEG     Source BLD     Site PERIPHERAL     Culture Result No Growth  Note: Blood cultures are incubated for 5 days and  are monitored continuously.Positive blood cultures  are called to the RN and reported as soon as  they are identified.      Narrative:      Per Hospital Policy: Only change Specimen Src: to \"Line\" if  specified by physician order.  Right Hand    BLOOD CULTURE [[id number]] Collected: 01/03/23 0842    Order Status: Completed Specimen: Blood from Peripheral Updated: 01/04/23 0728     Significant Indicator NEG     Source BLD     Site PERIPHERAL     Culture Result No Growth  Note: Blood cultures are incubated for 5 days and  are monitored continuously.Positive blood cultures  are called to the RN and reported as soon as  they are identified.      Narrative:      Per Hospital Policy: Only change Specimen Src: to \"Line\" if  specified by physician order.  Left Forearm/Arm    URINALYSIS [[id number]]  (Abnormal) Collected: 01/03/23 1952    Order Status: Completed Specimen: Urine, Cath Updated: 01/03/23 2013     Color Yellow     Character Clear     Specific Gravity 1.020     Ph 5.5     Glucose Negative mg/dL      Ketones Negative mg/dL      Protein Negative mg/dL      Bilirubin Negative     Urobilinogen, Urine 0.2     Nitrite Negative     Leukocyte Esterase Negative     Occult Blood Small     Micro Urine Req Microscopic    Narrative:      Collected By: [id number] CECI BRANDT    GRAM STAIN [[id number]] Collected: 01/02/23 1545    Order Status: Completed Specimen: Synovial Updated: 01/02/23 1836     Significant Indicator .     Source SYNO     Site Left Shoulder     Gram Stain Result Rare WBCs.  No organisms seen.      FLUID CULTURE W/GRAM STAIN [[id number]] " "    Order Status: No result Specimen: Body Fluid from Synovial Fluid     Blood Culture [[id number]] Collected: 12/26/22 2202    Order Status: Completed Specimen: Blood from Peripheral Updated: 12/31/22 2300     Significant Indicator NEG     Source BLD     Site PERIPHERAL     Culture Result No growth after 5 days of incubation.    Narrative:      1 of 2 for Blood Culture x 2 sites order. Per Hospital  Policy: Only change Specimen Src: to \"Line\" if specified by  physician order.  No site indicated    Blood Culture [[id number]] Collected: 12/26/22 2225    Order Status: Completed Specimen: Blood from Peripheral Updated: 12/31/22 2300     Significant Indicator NEG     Source BLD     Site PERIPHERAL     Culture Result No growth after 5 days of incubation.    Narrative:      2 of 2 blood culture x2  Sites order. Per Hospital Policy:  Only change Specimen Src: to \"Line\" if specified by physician  order.  Left Hand    CULTURE TISSUE W/ GRM STAIN [[id number]]  (Abnormal) Collected: 12/28/22 1351    Order Status: Completed Specimen: Tissue Updated: 12/31/22 1205     Significant Indicator POS     Source TISS     Site Left anterior tibia     Culture Result -     Gram Stain Result Rare WBCs.  No organisms seen.       Culture Result Streptococcus agalactiae (Group B)  Light growth      Narrative:      Surgery Specimen    Anaerobic Culture [[id number]] Collected: 12/28/22 1351    Order Status: Completed Specimen: Tissue Updated: 12/31/22 1205     Significant Indicator NEG     Source TISS     Site Left anterior tibia     Culture Result No Anaerobes isolated.    Narrative:      Surgery Specimen    CULTURE TISSUE W/ GRM STAIN [[id number]]  (Abnormal) Collected: 12/28/22 1350    Order Status: Completed Specimen: Tissue Updated: 12/31/22 1201     Significant Indicator POS     Source TISS     Site Left knee lateral gutter     Culture Result -     Gram Stain Result Many WBCs.  No organisms seen.       Culture Result Streptococcus agalactiae " (Group B)  Light growth      Narrative:      Surgery Specimen    Anaerobic Culture [[id number]] Collected: 12/28/22 1350    Order Status: Completed Specimen: Tissue Updated: 12/31/22 1201     Significant Indicator NEG     Source TISS     Site Left knee lateral gutter     Culture Result No Anaerobes isolated.    Narrative:      Surgery Specimen    CULTURE TISSUE W/ GRM STAIN [[id number]]  (Abnormal) Collected: 12/28/22 1432    Order Status: Completed Specimen: Tissue Updated: 12/31/22 1200     Significant Indicator POS     Source TISS     Site Left femur     Culture Result -     Gram Stain Result Rare WBCs.  No organisms seen.       Culture Result Streptococcus agalactiae (Group B)  Light growth      Narrative:      Surgery Specimen    Anaerobic Culture [[id number]] Collected: 12/28/22 1432    Order Status: Completed Specimen: Tissue Updated: 12/31/22 1200     Significant Indicator NEG     Source TISS     Site Left femur     Culture Result No Anaerobes isolated.    Narrative:      Surgery Specimen    CULTURE TISSUE W/ GRM STAIN [[id number]]  (Abnormal) Collected: 12/28/22 1433    Order Status: Completed Specimen: Tissue Updated: 12/31/22 1159     Significant Indicator POS     Source TISS     Site Left Tibia     Culture Result -     Gram Stain Result Many WBCs.  No organisms seen.       Culture Result Streptococcus agalactiae (Group B)  Light growth      Narrative:      Surgery Specimen    Anaerobic Culture [[id number]] Collected: 12/28/22 1433    Order Status: Completed Specimen: Tissue Updated: 12/31/22 1159     Significant Indicator NEG     Source TISS     Site Left Tibia     Culture Result No Anaerobes isolated.    Narrative:      Surgery Specimen    CULTURE TISSUE W/ GRM STAIN [[id number]]  (Abnormal) Collected: 12/28/22 1349    Order Status: Completed Specimen: Tissue Updated: 12/31/22 1159     Significant Indicator POS     Source TISS     Site Left Knee Medial Gutter     Culture Result -     Gram Stain Result Rare  WBCs.  No organisms seen.       Culture Result Streptococcus agalactiae (Group B)  Light growth      Narrative:      Surgery Specimen    Anaerobic Culture [[id number]] Collected: 12/28/22 1349    Order Status: Completed Specimen: Tissue Updated: 12/31/22 1159     Significant Indicator NEG     Source TISS     Site Left Knee Medial Gutter     Culture Result No Anaerobes isolated.    Narrative:      Surgery Specimen            Assessment:  Active Hospital Problems    Diagnosis     *Septic joint (HCC) [M00.9]     Hypomagnesemia [E83.42]     Metabolic acidosis [E87.20]     Anemia of chronic disease [D63.8]     Type 2 diabetes mellitus (HCC) [E11.9]     GERD (gastroesophageal reflux disease) [K21.9]     Acute encephalopathy [G93.40]     Abnormal finding of lung [R09.89]     Hepatic encephalopathy [K76.82]     Acute cystitis without hematuria [N30.00]     Hyperlipidemia [E78.5]     ELIZABETH (acute kidney injury) (HCC) [N17.9]     Atrial fibrillation (HCC) [I48.91]     Cirrhosis (HCC) [K74.60]     Hypertension [I10]     Thrombocytopenia (HCC) [D69.6]      Interval 24 hours:      Fevers, O2 RA  Labs reviewed  Imaging personally reviewed both images and report.   Micro reviewed    Continued on antibiotics as below.    Assessment:  April Alicia is a 62 y.o. female patient with history of alcohol and cocaine abuse, recently admitted with left knee prosthetic joint infection and left shoulder septic arthritis status post I&D of the left shoulder and removal of left knee hardware with placement of antibiotic spacer, completed 6 weeks of IV ceftriaxone at a facility on 8/30/2022.  She unfortunately was lost to follow-up and could not make several follow-up appointments, either with ID or orthopedics.  She is now readmitted with recurrent infection of the left knee spacer     Pertinent Diagnoses:  Fevers, started on 1/2 and ongoing  - Patient still with shoulder pain and some mild discomfort in knee.  Denies any headache,  sinus pain, mouth or tooth pain, cough or shortness of breath.  Denying any current abdominal pain or nausea.  No diarrhea.  She is still complaining of some dysuria.  No rash.    Left knee chronic prosthetic joint infection  Group B strep infection  UTI, E. coli  Recent left shoulder septic arthritis  History of alcohol and cocaine abuse     Plan:  -Patient was taken to the OR on 12/28, underwent explantation and placement of articulating spacer, wound VAC.  Multiple OR cultures again growing group B Strep including from tissue and femoral canal.  -Unclear source of the ongoing fevers, will trial stopping  cefazolin and transition to ceftriaxone   -Will attempt to visualize knee during next dressing change   -If fevers continue will recommend CT chest, abdomen & pelvis and repeat Urine cx as still with some dysuria   --- Unfortunately the patient missed 2 days of antibiotics from 1/1-1/2 and then was placed on cefazolin on 1/3.  Fever started 1/2 but has continued despite antibiotics   --- Has been complaining of shoulder pain, MRI left shoulder without contrast shows diminished medium size joint effusion which communicates with the subacromial space and marginal erosions.  Underwent synovial fluid removal from the left shoulder on 1/2/2022 WBC 1051, 40% polys, 40% lymphs, not consistent with infection.  Are no growth.  --- Blood cultures on 1/3 of no growth to date     Disposition: Recommend completing 6 weeks of IV antibiotics through 2/7/2023. At least weekly CBC with differential, CMP while on IV antibiotics.  Not cleared for discharge.     Need for PICC line: Yes, okay to place     Discussed with internal medicine, Dr. Somers.      ID will follow. Please call with questions.  ID clinic follow-up in 4 to 6 weeks

## 2023-01-05 NOTE — DISCHARGE PLANNING
Agency/Facility Name: [facility]   Spoke To: Svetlana   Outcome: Pt has been accepted and has a bed today, however pt's first choice is [facility]. If [facility] cannot accept then will proceed with transferring to [facility].     @1030  Agency/Facility Name: [facility]   Outcome: Left a voicemail with Vick in admissions in regards to referral status. Informed her they are pt's first choice. Waiting for callback.

## 2023-01-05 NOTE — CARE PLAN
The patient is Stable - Low risk of patient condition declining or worsening      Problem: Fall Risk  Goal: Patient will remain free from falls  Outcome: Progressing  Pt is instructed to call when in need of assistance.   Bed alarm & treaded socks in place.     Problem: Bowel Elimination  Goal: Establish and maintain regular bowel function  Outcome: Progressing   Patient had BM 1/4

## 2023-01-06 ENCOUNTER — APPOINTMENT (OUTPATIENT)
Dept: RADIOLOGY | Facility: MEDICAL CENTER | Age: 63
DRG: 486 | End: 2023-01-06
Attending: HOSPITALIST
Payer: MEDICAID

## 2023-01-06 LAB
ALBUMIN SERPL BCP-MCNC: 1.9 G/DL (ref 3.2–4.9)
BUN SERPL-MCNC: 11 MG/DL (ref 8–22)
CALCIUM ALBUM COR SERPL-MCNC: 9.6 MG/DL (ref 8.5–10.5)
CALCIUM SERPL-MCNC: 7.9 MG/DL (ref 8.5–10.5)
CHLORIDE SERPL-SCNC: 108 MMOL/L (ref 96–112)
CO2 SERPL-SCNC: 17 MMOL/L (ref 20–33)
CREAT SERPL-MCNC: 0.78 MG/DL (ref 0.5–1.4)
ERYTHROCYTE [DISTWIDTH] IN BLOOD BY AUTOMATED COUNT: 55.3 FL (ref 35.9–50)
GFR SERPLBLD CREATININE-BSD FMLA CKD-EPI: 85 ML/MIN/1.73 M 2
GLUCOSE SERPL-MCNC: 139 MG/DL (ref 65–99)
HCT VFR BLD AUTO: 23.9 % (ref 37–47)
HGB BLD-MCNC: 7.4 G/DL (ref 12–16)
MCH RBC QN AUTO: 28.5 PG (ref 27–33)
MCHC RBC AUTO-ENTMCNC: 31 G/DL (ref 33.6–35)
MCV RBC AUTO: 91.9 FL (ref 81.4–97.8)
PHOSPHATE SERPL-MCNC: 2.8 MG/DL (ref 2.5–4.5)
PLATELET # BLD AUTO: 123 K/UL (ref 164–446)
PMV BLD AUTO: 10.3 FL (ref 9–12.9)
POTASSIUM SERPL-SCNC: 3.9 MMOL/L (ref 3.6–5.5)
RBC # BLD AUTO: 2.6 M/UL (ref 4.2–5.4)
SODIUM SERPL-SCNC: 133 MMOL/L (ref 135–145)
WBC # BLD AUTO: 4.9 K/UL (ref 4.8–10.8)

## 2023-01-06 PROCEDURE — A9270 NON-COVERED ITEM OR SERVICE: HCPCS | Performed by: NURSE PRACTITIONER

## 2023-01-06 PROCEDURE — 770001 HCHG ROOM/CARE - MED/SURG/GYN PRIV*

## 2023-01-06 PROCEDURE — 74018 RADEX ABDOMEN 1 VIEW: CPT

## 2023-01-06 PROCEDURE — 80069 RENAL FUNCTION PANEL: CPT

## 2023-01-06 PROCEDURE — 700102 HCHG RX REV CODE 250 W/ 637 OVERRIDE(OP): Performed by: HOSPITALIST

## 2023-01-06 PROCEDURE — 700111 HCHG RX REV CODE 636 W/ 250 OVERRIDE (IP): Performed by: HOSPITALIST

## 2023-01-06 PROCEDURE — A9270 NON-COVERED ITEM OR SERVICE: HCPCS | Performed by: HOSPITALIST

## 2023-01-06 PROCEDURE — A9270 NON-COVERED ITEM OR SERVICE: HCPCS | Performed by: INTERNAL MEDICINE

## 2023-01-06 PROCEDURE — 99233 SBSQ HOSP IP/OBS HIGH 50: CPT | Performed by: HOSPITALIST

## 2023-01-06 PROCEDURE — 97530 THERAPEUTIC ACTIVITIES: CPT

## 2023-01-06 PROCEDURE — 700102 HCHG RX REV CODE 250 W/ 637 OVERRIDE(OP): Performed by: NURSE PRACTITIONER

## 2023-01-06 PROCEDURE — 85027 COMPLETE CBC AUTOMATED: CPT

## 2023-01-06 PROCEDURE — 97116 GAIT TRAINING THERAPY: CPT

## 2023-01-06 PROCEDURE — 700102 HCHG RX REV CODE 250 W/ 637 OVERRIDE(OP): Performed by: STUDENT IN AN ORGANIZED HEALTH CARE EDUCATION/TRAINING PROGRAM

## 2023-01-06 PROCEDURE — 700102 HCHG RX REV CODE 250 W/ 637 OVERRIDE(OP): Performed by: INTERNAL MEDICINE

## 2023-01-06 PROCEDURE — 700111 HCHG RX REV CODE 636 W/ 250 OVERRIDE (IP): Performed by: INTERNAL MEDICINE

## 2023-01-06 PROCEDURE — 97535 SELF CARE MNGMENT TRAINING: CPT

## 2023-01-06 PROCEDURE — 36415 COLL VENOUS BLD VENIPUNCTURE: CPT

## 2023-01-06 PROCEDURE — A9270 NON-COVERED ITEM OR SERVICE: HCPCS | Performed by: STUDENT IN AN ORGANIZED HEALTH CARE EDUCATION/TRAINING PROGRAM

## 2023-01-06 RX ADMIN — SODIUM BICARBONATE 1300 MG: 650 TABLET ORAL at 17:38

## 2023-01-06 RX ADMIN — GABAPENTIN 200 MG: 100 CAPSULE ORAL at 04:08

## 2023-01-06 RX ADMIN — CALCIUM CARBONATE 1000 MG: 500 TABLET, CHEWABLE ORAL at 05:57

## 2023-01-06 RX ADMIN — CEFTRIAXONE SODIUM 2000 MG: 10 INJECTION, POWDER, FOR SOLUTION INTRAVENOUS at 05:58

## 2023-01-06 RX ADMIN — DRONEDARONE 400 MG: 400 TABLET, FILM COATED ORAL at 08:42

## 2023-01-06 RX ADMIN — CALCIUM CARBONATE 1000 MG: 500 TABLET, CHEWABLE ORAL at 19:50

## 2023-01-06 RX ADMIN — OXYCODONE HYDROCHLORIDE 10 MG: 10 TABLET ORAL at 04:08

## 2023-01-06 RX ADMIN — NICOTINE 14 MG: 14 PATCH TRANSDERMAL at 04:09

## 2023-01-06 RX ADMIN — OXYCODONE HYDROCHLORIDE 10 MG: 10 TABLET ORAL at 19:49

## 2023-01-06 RX ADMIN — DRONEDARONE 400 MG: 400 TABLET, FILM COATED ORAL at 17:38

## 2023-01-06 RX ADMIN — OXYCODONE HYDROCHLORIDE 10 MG: 10 TABLET ORAL at 09:37

## 2023-01-06 RX ADMIN — SODIUM BICARBONATE 1300 MG: 650 TABLET ORAL at 04:08

## 2023-01-06 RX ADMIN — GABAPENTIN 200 MG: 100 CAPSULE ORAL at 17:37

## 2023-01-06 RX ADMIN — OMEPRAZOLE 20 MG: 20 CAPSULE, DELAYED RELEASE ORAL at 17:37

## 2023-01-06 RX ADMIN — ATORVASTATIN CALCIUM 20 MG: 20 TABLET, FILM COATED ORAL at 21:57

## 2023-01-06 RX ADMIN — ENOXAPARIN SODIUM 40 MG: 40 INJECTION SUBCUTANEOUS at 17:37

## 2023-01-06 RX ADMIN — ACETAMINOPHEN 650 MG: 325 TABLET ORAL at 05:57

## 2023-01-06 RX ADMIN — OMEPRAZOLE 20 MG: 20 CAPSULE, DELAYED RELEASE ORAL at 04:08

## 2023-01-06 RX ADMIN — LACTULOSE 15 ML: 20 SOLUTION ORAL at 14:35

## 2023-01-06 RX ADMIN — DOCUSATE SODIUM 50 MG AND SENNOSIDES 8.6 MG 2 TABLET: 8.6; 5 TABLET, FILM COATED ORAL at 04:08

## 2023-01-06 RX ADMIN — OXYCODONE HYDROCHLORIDE 10 MG: 10 TABLET ORAL at 14:35

## 2023-01-06 RX ADMIN — Medication 220 MG: at 04:08

## 2023-01-06 RX ADMIN — ACETAMINOPHEN 650 MG: 325 TABLET ORAL at 19:50

## 2023-01-06 RX ADMIN — DOCUSATE SODIUM 50 MG AND SENNOSIDES 8.6 MG 2 TABLET: 8.6; 5 TABLET, FILM COATED ORAL at 17:38

## 2023-01-06 ASSESSMENT — COGNITIVE AND FUNCTIONAL STATUS - GENERAL
SUGGESTED CMS G CODE MODIFIER MOBILITY: CL
STANDING UP FROM CHAIR USING ARMS: A LITTLE
MOVING FROM LYING ON BACK TO SITTING ON SIDE OF FLAT BED: UNABLE
HELP NEEDED FOR BATHING: A LOT
MOVING TO AND FROM BED TO CHAIR: UNABLE
TOILETING: A LOT
DRESSING REGULAR LOWER BODY CLOTHING: A LOT
CLIMB 3 TO 5 STEPS WITH RAILING: A LOT
PERSONAL GROOMING: A LITTLE
DAILY ACTIVITIY SCORE: 16
MOBILITY SCORE: 12
WALKING IN HOSPITAL ROOM: A LITTLE
DRESSING REGULAR UPPER BODY CLOTHING: A LITTLE
SUGGESTED CMS G CODE MODIFIER DAILY ACTIVITY: CK
TURNING FROM BACK TO SIDE WHILE IN FLAT BAD: A LOT

## 2023-01-06 ASSESSMENT — ENCOUNTER SYMPTOMS
VOMITING: 0
WEAKNESS: 1
DIZZINESS: 0
CHILLS: 0
NAUSEA: 0
FEVER: 0
SHORTNESS OF BREATH: 0
EYE REDNESS: 0
ABDOMINAL PAIN: 0
MYALGIAS: 1

## 2023-01-06 ASSESSMENT — GAIT ASSESSMENTS
GAIT LEVEL OF ASSIST: CONTACT GUARD ASSIST
ASSISTIVE DEVICE: FRONT WHEEL WALKER
DEVIATION: ANTALGIC;BRADYKINETIC;DECREASED HEEL STRIKE;DECREASED TOE OFF
DISTANCE (FEET): 20
DISTANCE (FEET): 15

## 2023-01-06 ASSESSMENT — PAIN DESCRIPTION - PAIN TYPE
TYPE: ACUTE PAIN;SURGICAL PAIN
TYPE: ACUTE PAIN;SURGICAL PAIN

## 2023-01-06 ASSESSMENT — LIFESTYLE VARIABLES: SUBSTANCE_ABUSE: 0

## 2023-01-06 NOTE — THERAPY
Occupational Therapy  Daily Treatment     Patient Name: April Alicia  Age:  62 y.o., Sex:  female  Medical Record #: [id number]  Today's Date: 1/6/2023     Precautions: Weight Bearing As Tolerated Left Lower Extremity, Fall Risk, Immobilizer Left Lower Extremity  Comments: [place]    Assessment    Pt seen for OT tx session. Pt agreeable, but reported increased pain after recent session with physical therapy. Pt agreeable to attempt toilet txf, however upon ambulation to bathroom, pt's pain in knee and elbow increased significantly requiring return to bed. Encouraged pt to attempt toilet txf tomorrow with staff. Pt primarily limited by pain, weakness, poor balance, and poor insight. Will continue to follow.    Plan    Occupational Therapy Treatment Plan  O.T. Treatment Plan: (P) Modify Current Treatment Plan  Modified Plan: (P) Change to Three Times per Week, Self Care / Activities of Daily Living, Adaptive Equipment, Cognitive Skill Development, Neuro Re-Education / Balance, Therapeutic Exercises, Therapeutic Activity    DC Equipment Recommendations: (P) Unable to determine at this time  Discharge Recommendations: (P) Recommend post-acute placement for additional occupational therapy services prior to discharge home       01/06/23 1455   Cognition    Comments agreeable, [place]   Balance   Sitting Balance (Static) Fair   Sitting Balance (Dynamic) Fair   Standing Balance (Static) Fair -   Standing Balance (Dynamic) Fair -   Weight Shift Sitting Fair   Weight Shift Standing Poor   Skilled Intervention Verbal Cuing   Comments limited by pain   Bed Mobility    Sit to Supine Minimal Assist   Scooting Supervised   Skilled Intervention Verbal Cuing   Activities of Daily Living   Lower Body Dressing Moderate Assist   Toileting Maximal Assist   Skilled Intervention Verbal Cuing;Tactile Cuing   Comments attempted mobility to bathroom for toileting, unable to make it 2/2 pain, requested return to bed and required use of bed  pan, agreeable to attempt toilet txf with therapy or RN tmr   How much help from another person does the patient currently need...   6 Clicks Daily Activity Score 16   Functional Mobility   Sit to Stand Moderate Assist   Bed, Chair, Wheelchair Transfer Minimal Assist   Mobility short room distances, return to bed   Distance (Feet) 15   Skilled Intervention Verbal Cuing;Tactile Cuing   Patient / Family Goals   Patient / Family Goal #1 to return home   Short Term Goals   Short Term Goal # 1 supervised with UB dressing   Goal Outcome # 1 Progressing as expected   Short Term Goal # 2 min A with LB dressing   Goal Outcome # 2 Progressing slower than expected   Short Term Goal # 3 min A with ADL txfs   Goal Outcome # 3 Progressing as expected   Education Group   Role of Occupational Therapist Patient Response Patient;Acceptance;Explanation;Reinforcement Needed   Transfers Patient Response Patient;Acceptance;Explanation;Demonstration;Verbal Demonstration;Action Demonstration;Reinforcement Needed   Occupational Therapy Treatment Plan   O.T. Treatment Plan Modify Current Treatment Plan   Modified Plan Change to Three Times per Week;Self Care / Activities of Daily Living;Adaptive Equipment;Cognitive Skill Development;Neuro Re-Education / Balance;Therapeutic Exercises;Therapeutic Activity   Anticipated Discharge Equipment and Recommendations   DC Equipment Recommendations Unable to determine at this time   Discharge Recommendations Recommend post-acute placement for additional occupational therapy services prior to discharge home

## 2023-01-06 NOTE — CARE PLAN
The patient is Stable - Low risk of patient condition declining or worsening    Shift Goals: Pain management; monitor vitals; IV antibiotics and PICC line placement  Clinical Goals: Pain Management; Safety; Stable Vitals; Antibiotic Therapy  Patient Goals: Pain Management; Comfort  Family Goals: N/A    Progress made toward(s) clinical / shift goals:  see above    Problem: Knowledge Deficit - Standard  Goal: Patient and family/care givers will demonstrate understanding of plan of care, disease process/condition, diagnostic tests and medications  Outcome: Progressing     Problem: Fall Risk  Goal: Patient will remain free from falls  Outcome: Progressing     Problem: Skin Integrity  Goal: Skin integrity is maintained or improved  Outcome: Progressing     Problem: Communication  Goal: The ability to communicate needs accurately and effectively will improve  Outcome: Progressing       Patient is not progressing towards the following goals:      Problem: Pain - Standard  Goal: Alleviation of pain or a reduction in pain to the patient’s comfort goal  Outcome: Not Progressing

## 2023-01-06 NOTE — PROGRESS NOTES
Hospital Medicine Daily Progress Note    Date of Service  1/6/2023    Chief Complaint  April Alicia is a 62 y.o. female admitted 12/26/2022 with left knee pain    Hospital Course  This is a 62-year-old female with a past medical history significant for cocaine abuse, ethanol abuse complicated by liver cirrhosis, asthma, atrial fibrillation, second hypercoagulable state was on Eliquis, diabetes mellitus, hypertension presented to ER on 12/ planes of increased pain in her left knee associated with fever, chills.    She had hx of bilateral knee surgery In 7/22, was found to have group B streptococcal bacteremia secondary to infection of tibial component of LEFT arthroplasty hardware; she underwent LEFT knee revision, total arthroplasty with antibiotic spacer placed. Plan was then to continue with CTX daily through 8/30/22. Followed with Dr. Gray (of [facility]).    In ER, underwent arthrocentesis, WBC 21,150; culture growing group B strep orthopedic and ID were consulted, underwent surgery with Dr. Mcelroy on 12/20/2022 surgical culture growing group B strep.    She was started on broad-spectrum antibiotic including vancomycin and Unasyn later switched to Rocephin for 3 days for urinary tract infection.  ID was consulted, recommended ancef 7/2023.  While patient remains on IV antibiotics, need CBC, CMP, ESR, CRP.    Hospital course admitted with acute renal failure, which has been improving.  She received PRBC transfusion during the stay in the hospital.    Patient continued to complain of the pain in her left shoulder, MR showing Diminished medium sized joint effusion communicates with the subacromial space and there are marginal erosions. This could indicate recurrent septic arthropathy and osteomyelitis.  Ultrasound noted to have WBC 1051, RBC [id number].    Interval events:  -- Patient continued to remain febrile with a temperature as high as 101.8, blood cultures x2, chest x-ray,  UA ordered.  Patient continued to complain of the pain over shoulder worse on the left side  --Sodium is noted to be 134, bicarb of 17, hemoglobin 7.6, monitor, present 7, transfuse.    -- It is noted that patient has not been on any IV antibiotics within 48 hours, that why she has been having fever  -- will start patient on IV abx    Plan of been discussed with the patient, nursing staff, case management.  We will contact orthopedic surgery tomorrow      1/4:  -- Patient continued to have fever, last fever on 1/3/2025 800.6, blood cultures x2 ordered, pending.  Continue IV antibiotics as per infectious disease recommendation.  --Arthrocentesis fluid from the left shoulder  joint reviewed, not infectious as per orthopedic surgery  --PT/OT has evaluate the patient medical postacute.  At this time patient not medically stable to discharge considering her having fever.  Do not provide Tylenol until patient spikes of temperature  -Hemoglobin is noted to be 7.3, monitor, this is considered iron deficiency, will provide IV iron.      1/5:  -- Went to the  bedside and evaluated the patient, evaluate the patient, limited insight including nursing note.  Patient is alert, awake, answering questions appropriately patient seen noted to be in better mood.  She reports that her shoulder pain has gotten significantly better.  We will continue to complain of the pain in her knee.  --Continues to have fever as high as 101.3, blood cultures x2, chest x-ray, UA ordered.  Influenza, RSV, COVID ordered  --Currently on Ancef, will continue    1/6:  -- Overnight, patient remained afebrile, last temperature on 1/5 at 6 PM.  Otherwise medicine has been stable.  Patient started feeling nauseated, will obtain KUB  --Patient is spiking temperature despite being on IV Ancef, ID was reconsulted, patient was started on IV Rocephin we will continue  -- na at 133 , monitor  --hemoglobin at 7.4, monitor, if less than 7, transfuse.  Considering  patient being anemic and requiring blood transfusion, she would not discharge on Eliquis, she understand the risk of not being on Eliquis including stroke      I have discussed this patient's plan of care and discharge plan at IDT rounds today with Case Management, Nursing, Nursing leadership, and other members of the IDT team.    Consultants/Specialty  infectious disease and orthopedics    Code Status  DNAR/DNI    Disposition  Patient is not medically cleared for discharge.   Anticipate discharge to to an inpatient rehabilitation hospital versus SNF  I have placed the appropriate orders for post-discharge needs.    Review of Systems  Review of Systems   Constitutional:  Positive for malaise/fatigue. Negative for chills and fever.   HENT:  Negative for congestion and hearing loss.    Eyes:  Negative for redness.   Respiratory:  Negative for shortness of breath.    Cardiovascular:  Negative for chest pain.   Gastrointestinal:  Negative for abdominal pain, nausea and vomiting.   Genitourinary:  Negative for dysuria.   Musculoskeletal:  Positive for joint pain and myalgias.   Skin: Negative.    Neurological:  Positive for weakness. Negative for dizziness.   Psychiatric/Behavioral:  Negative for substance abuse.    All other systems reviewed and are negative.     Physical Exam  Temp:  [36.2 °C (97.2 °F)-38 °C (100.4 °F)] 36.2 °C (97.2 °F)  Pulse:  [52-67] 60  Resp:  [14-18] 18  BP: (110-129)/(51-77) 118/77  SpO2:  [93 %-95 %] 95 %    Physical Exam  Vitals and nursing note reviewed.   Constitutional:       Appearance: Normal appearance.   HENT:      Head: Normocephalic and atraumatic.   Eyes:      Extraocular Movements: Extraocular movements intact.   Cardiovascular:      Rate and Rhythm: Normal rate and regular rhythm.      Heart sounds: Murmur heard.   Pulmonary:      Effort: Pulmonary effort is normal.      Breath sounds: Rales present.   Abdominal:      General: Bowel sounds are normal. There is no distension.       Palpations: Abdomen is soft.      Tenderness: There is no abdominal tenderness.   Musculoskeletal:      Cervical back: Neck supple.      Left knee: Tenderness present.      Right lower leg: Edema (ankles) present.      Left lower leg: Edema (ankles) present.      Comments: L knee wrapped, drain in place   Skin:     Findings: No erythema.      Comments: Skin graft scars b/l LEs   Neurological:      Mental Status: She is alert and oriented to person, place, and time. Mental status is at baseline.      Cranial Nerves: No cranial nerve deficit.       Fluids    Intake/Output Summary (Last 24 hours) at 1/6/2023 1019  Last data filed at 1/5/2023 1521  Gross per 24 hour   Intake 420 ml   Output 800 ml   Net -380 ml         Laboratory  Recent Labs     01/04/23 0257 01/05/23 0518 01/06/23  0122   WBC 5.5 5.2 4.9   RBC 2.56* 2.52* 2.60*   HEMOGLOBIN 7.3* 7.3* 7.4*   HEMATOCRIT 22.7* 23.2* 23.9*   MCV 88.7 92.1 91.9   MCH 28.5 29.0 28.5   MCHC 32.2* 31.5* 31.0*   RDW 52.5* 55.9* 55.3*   PLATELETCT 117* 116* 123*   MPV 9.5 10.2 10.3       Recent Labs     01/04/23 0257 01/05/23 0518 01/06/23  0122   SODIUM 136 132* 133*   POTASSIUM 4.1 4.1 3.9   CHLORIDE 112 108 108   CO2 18* 16* 17*   GLUCOSE 132* 111* 139*   BUN 13 13 11   CREATININE 0.86 0.74 0.78   CALCIUM 7.8* 7.9* 7.9*                     Imaging  DX-CHEST-LIMITED (1 VIEW)   Final Result      1.  Hypoinflation without other evidence for acute cardiopulmonary disease.   2.  Evidence of old granulomatous disease.      DX-INJECT OR ASPIRATE W/O US GUIDANCE-MED JOINT LEFT   Final Result      Successful fluoroscopically-guided aspiration of the left glenohumeral joint with 5 mL bloody fluid sent to the lab.      MR-SHOULDER-W/O LEFT   Final Result      Diminished medium sized joint effusion communicates with the subacromial space and there are marginal erosions. This could indicate recurrent septic arthropathy and osteomyelitis but the appearance is improved from comparison  and it could also just be    degenerative      Severe glenohumeral cartilage loss with moderate osteophyte formation and evidence of prior posterior glenoid fracture, chronic posterior humeral head subluxation without dislocation      Posterior and superior glenoid labrum tears, chronic      Complete supraspinatus, subscapularis tears with fatty atrophy      Nonvisualization of the long head biceps intra-articular segment indicating likely chronic tear and retraction      Considerably degraded by motion artifact      DX-KNEE 2- LEFT   Final Result      Antibiotic impregnated radiopaque beads within and about the knee arthroplasty.      DX-SHOULDER 2+ RIGHT   Final Result      1.  Moderately advanced acromioclavicular degenerative changes.   2.  Acromiohumeral narrowing suggesting chronic rotator cuff tears.   3.  No acute fracture or dislocation.      DX-CHEST-PORTABLE (1 VIEW)   Final Result      1.  No acute cardiopulmonary abnormality.   2.  Prior granulomatous exposure.         DX-KNEE 3 VIEWS LEFT   Final Result      1.  Revised left knee arthroplasty is unchanged since prior. No evidence of hardware complication.   2.  No acute fracture or dislocation.   3.  Marked soft tissue swelling and suspected knee effusion.      IR-US GUIDED PIV    (Results Pending)          Assessment/Plan  * Septic joint (HCC)- (present on admission)  Assessment & Plan  Patient has a history of group B strep bacteremia secondary to infection of tibial component of left arthroplasty hardware  Status post antibiotic spacer placement by orthopedics (Dr. Luz)   --Comes in with left knee pain and swelling and L shoulder pain   --Arthrocentesis in the ER concerning for septic knee; Orthopedics consulted: I&D 12/28, cultures growing GBS  Infectious disease consulted: recommending Ancef x 6 weeks, end date 2/7/23, needs weekly labs CBC, CMP  MR Shoulder effusion status post arthrocentesis, fluid analysis didn't show any  infection      Considering patient spiking up temp despite her being on IV antibiotics, ID was reconsulted 1/5/2022, started on Rocephin on1/5    Hepatic encephalopathy  Assessment & Plan  Resolved    Anemia of chronic disease  Assessment & Plan  S/p PRBC transfusion x 1.  Iron studies consistent with iron deficiency, continue IV iron   -- hemoglobin at 7.4  If less than 7, will transfuse    ELIZABETH (acute kidney injury) (HCC)  Assessment & Plan  Resolved  Avoid nephrotoxin, creatinine at 0.78, monitor    Thrombocytopenia (HCC)- (present on admission)  Assessment & Plan  Likely in setting of cirrhosis  Holding Eliquis  Monitor    Metabolic acidosis  Assessment & Plan  Normal AG, likely in setting of multiple BMs from lactulose  Improving     Atrial fibrillation (HCC)- (present on admission)  Assessment & Plan  Continue home dronedarone 400 mg twice daily  Holding eliquis d/t anemia requiring transfusions   -- Discussed adverse effect of holding Eliquis especially in the light of atrial fibrillation including stroke, patient states understanding    Cirrhosis (HCC)- (present on admission)  Assessment & Plan  In setting of alcohol use however no EtOH in over a year  Continue home lactulose    Hypertension- (present on admission)  Assessment & Plan  Home regimen: Losartan 100 mg daily, felodipine 5 mg q. Evening  Hold as patient Bp is noted to eb on the lower side    Hypomagnesemia  Assessment & Plan  Replete and monitor    Acute encephalopathy  Assessment & Plan  Likely in setting of infection +/- hepatic encephalopathy  Ammonia normal  Treat infection  Decreased gabapentin   Resolved and patient in alert and orineted    GERD (gastroesophageal reflux disease)  Assessment & Plan  Continue home omeprazole      Type 2 diabetes mellitus (HCC)  Assessment & Plan  Diet controlled  a1c 5.4  Dc SSI  Regular diet per patient request     Acute cystitis without hematuria- (present on admission)  Assessment & Plan  Patient c/o pain  with urination and lower abd discomfort  UA c/f infection  ucx growing e.coli resistant to PCN  S/p completion of treatment          VTE prophylaxis: lovenox  I have performed a physical exam and reviewed and updated ROS and Plan today (1/6/2023). In review of yesterday's note (1/5/2023), there are no changes except as documented above.

## 2023-01-06 NOTE — THERAPY
Physical Therapy   Daily Treatment     Patient Name: April Alicia  Age:  62 y.o., Sex:  female  Medical Record #: [id number]  Today's Date: 1/6/2023     Precautions: Weight Bearing As Tolerated Left Lower Extremity;Fall Risk;Immobilizer Left Lower Extremity  Comments: [place]    Assessment    Pt conts to demonstrate improved mob. She was umesh to progress amb distance x20ft with FWW and CGA, slow antalgic gait. She conts to require physical assistance to negotiate LLE off EOB and for STS. Up in chair at end of session.     Plan    Physical Therapy Treatment Plan: Continue Current Treatment Plan    DC Equipment Recommendations: Unable to determine at this time  Discharge Recommendations: Recommend post-acute placement for additional physical therapy services prior to discharge home     Objective    Cognition    Cognition / Consciousness X   Level of Consciousness Alert   Safety Awareness Impaired   Attention Impaired   Sequencing Impaired   Comments [place], directing care/sequencing   Passive ROM Lower Body   Comments LLE immobilized   Strength Lower Body   Comments able to bear wt on LLE in immobilizer   Balance   Sitting Balance (Static) Fair +   Sitting Balance (Dynamic) Fair   Standing Balance (Static) Fair -   Standing Balance (Dynamic) Fair -   Weight Shift Sitting Fair   Weight Shift Standing Fair   Skilled Intervention Verbal Cuing;Sequencing;Postural Facilitation   Comments w/ FWW   Bed Mobility    Supine to Sit Minimal Assist   Sit to Supine   (UIC post)   Skilled Intervention Verbal Cuing;Sequencing   Comments assist to negotiate LLE, pt very fearful of LLE falling off bed d/t pain   Gait Analysis   Gait Level Of Assist Contact Guard Assist   Assistive Device Front Wheel Walker   Distance (Feet) 20   # of Times Distance was Traveled 1   Deviation Antalgic;Bradykinetic;Decreased Heel Strike;Decreased Toe Off   Weight Bearing Status WBAT in knee immobilizer   Skilled Intervention Verbal  Cuing;Sequencing;Postural Facilitation   Functional Mobility   Sit to Stand Moderate Assist   Bed, Chair, Wheelchair Transfer Minimal Assist   Transfer Method Stand Step   Skilled Intervention Verbal Cuing;Sequencing;Postural Facilitation   Short Term Goals    Short Term Goal # 1 Pt to move supine to/from eob w/ spv in 6 visits to improve fxl indep   Goal Outcome # 1 Progressing as expected   Short Term Goal # 2 Pt to move sit to/from stand w/ spv in 6 visits to improve fxl indep   Goal Outcome # 2 Progressing as expected   Short Term Goal # 3 Pt to ambulate 75 ft w/ fww and spv in 6 visits to improve fxl indep   Goal Outcome # 3 Progressing as expected

## 2023-01-06 NOTE — PROGRESS NOTES
0900: Went in to see patient this morning and the patient was alert and orientated x4. Patient was to check to see when PT was going to come by and see patient before receiving pain meds.    1110: Spoke with attending MD about plan of care for patient and long term IV antibiotics. MD ordered PICC line for patient.    1400: PT worked with patient and pain meds were given.

## 2023-01-06 NOTE — DISCHARGE PLANNING
Agency/Facility Name: [facility]   Outcome: Left a message for Vick in admissions in regards to referral status.    @[id number]  Agency/Facility Name: [facility]   Spoke To: Vick   Outcome: Left a voicemail with Vick in admissions. Earlier stated pt was accepted but informed her that pt is not medically clear

## 2023-01-07 ENCOUNTER — APPOINTMENT (OUTPATIENT)
Dept: RADIOLOGY | Facility: MEDICAL CENTER | Age: 63
DRG: 486 | End: 2023-01-07
Attending: HOSPITALIST
Payer: MEDICAID

## 2023-01-07 ENCOUNTER — APPOINTMENT (OUTPATIENT)
Dept: RADIOLOGY | Facility: MEDICAL CENTER | Age: 63
DRG: 486 | End: 2023-01-07
Attending: INTERNAL MEDICINE
Payer: MEDICAID

## 2023-01-07 PROBLEM — R50.9 FEVER: Status: ACTIVE | Noted: 2023-01-07

## 2023-01-07 LAB
ALBUMIN SERPL BCP-MCNC: 2 G/DL (ref 3.2–4.9)
BUN SERPL-MCNC: 12 MG/DL (ref 8–22)
CALCIUM ALBUM COR SERPL-MCNC: 9.4 MG/DL (ref 8.5–10.5)
CALCIUM SERPL-MCNC: 7.8 MG/DL (ref 8.5–10.5)
CHLORIDE SERPL-SCNC: 109 MMOL/L (ref 96–112)
CO2 SERPL-SCNC: 19 MMOL/L (ref 20–33)
CREAT SERPL-MCNC: 0.75 MG/DL (ref 0.5–1.4)
ERYTHROCYTE [DISTWIDTH] IN BLOOD BY AUTOMATED COUNT: 54.9 FL (ref 35.9–50)
GFR SERPLBLD CREATININE-BSD FMLA CKD-EPI: 90 ML/MIN/1.73 M 2
GLUCOSE SERPL-MCNC: 221 MG/DL (ref 65–99)
HCT VFR BLD AUTO: 23.5 % (ref 37–47)
HGB BLD-MCNC: 7.5 G/DL (ref 12–16)
MCH RBC QN AUTO: 28.8 PG (ref 27–33)
MCHC RBC AUTO-ENTMCNC: 31.9 G/DL (ref 33.6–35)
MCV RBC AUTO: 90.4 FL (ref 81.4–97.8)
PHOSPHATE SERPL-MCNC: 3 MG/DL (ref 2.5–4.5)
PLATELET # BLD AUTO: 107 K/UL (ref 164–446)
PMV BLD AUTO: 10 FL (ref 9–12.9)
POTASSIUM SERPL-SCNC: 4 MMOL/L (ref 3.6–5.5)
RBC # BLD AUTO: 2.6 M/UL (ref 4.2–5.4)
SODIUM SERPL-SCNC: 134 MMOL/L (ref 135–145)
WBC # BLD AUTO: 4.5 K/UL (ref 4.8–10.8)

## 2023-01-07 PROCEDURE — A9270 NON-COVERED ITEM OR SERVICE: HCPCS | Performed by: INTERNAL MEDICINE

## 2023-01-07 PROCEDURE — 700102 HCHG RX REV CODE 250 W/ 637 OVERRIDE(OP): Performed by: HOSPITALIST

## 2023-01-07 PROCEDURE — 770001 HCHG ROOM/CARE - MED/SURG/GYN PRIV*

## 2023-01-07 PROCEDURE — 700111 HCHG RX REV CODE 636 W/ 250 OVERRIDE (IP): Performed by: HOSPITALIST

## 2023-01-07 PROCEDURE — 700102 HCHG RX REV CODE 250 W/ 637 OVERRIDE(OP): Performed by: INTERNAL MEDICINE

## 2023-01-07 PROCEDURE — 700117 HCHG RX CONTRAST REV CODE 255: Performed by: INTERNAL MEDICINE

## 2023-01-07 PROCEDURE — A9270 NON-COVERED ITEM OR SERVICE: HCPCS | Performed by: STUDENT IN AN ORGANIZED HEALTH CARE EDUCATION/TRAINING PROGRAM

## 2023-01-07 PROCEDURE — 85027 COMPLETE CBC AUTOMATED: CPT

## 2023-01-07 PROCEDURE — 700102 HCHG RX REV CODE 250 W/ 637 OVERRIDE(OP): Performed by: STUDENT IN AN ORGANIZED HEALTH CARE EDUCATION/TRAINING PROGRAM

## 2023-01-07 PROCEDURE — A9270 NON-COVERED ITEM OR SERVICE: HCPCS | Performed by: HOSPITALIST

## 2023-01-07 PROCEDURE — 71260 CT THORAX DX C+: CPT

## 2023-01-07 PROCEDURE — A9270 NON-COVERED ITEM OR SERVICE: HCPCS | Performed by: NURSE PRACTITIONER

## 2023-01-07 PROCEDURE — 700111 HCHG RX REV CODE 636 W/ 250 OVERRIDE (IP): Performed by: INTERNAL MEDICINE

## 2023-01-07 PROCEDURE — 99233 SBSQ HOSP IP/OBS HIGH 50: CPT | Performed by: INTERNAL MEDICINE

## 2023-01-07 PROCEDURE — 99232 SBSQ HOSP IP/OBS MODERATE 35: CPT | Performed by: INTERNAL MEDICINE

## 2023-01-07 PROCEDURE — 36415 COLL VENOUS BLD VENIPUNCTURE: CPT

## 2023-01-07 PROCEDURE — 700102 HCHG RX REV CODE 250 W/ 637 OVERRIDE(OP): Performed by: NURSE PRACTITIONER

## 2023-01-07 PROCEDURE — 80069 RENAL FUNCTION PANEL: CPT

## 2023-01-07 RX ADMIN — IOHEXOL 95 ML: 350 INJECTION, SOLUTION INTRAVENOUS at 13:15

## 2023-01-07 RX ADMIN — OMEPRAZOLE 20 MG: 20 CAPSULE, DELAYED RELEASE ORAL at 17:39

## 2023-01-07 RX ADMIN — ATORVASTATIN CALCIUM 20 MG: 20 TABLET, FILM COATED ORAL at 21:40

## 2023-01-07 RX ADMIN — CEFTRIAXONE SODIUM 2000 MG: 10 INJECTION, POWDER, FOR SOLUTION INTRAVENOUS at 06:06

## 2023-01-07 RX ADMIN — DOCUSATE SODIUM 50 MG AND SENNOSIDES 8.6 MG 2 TABLET: 8.6; 5 TABLET, FILM COATED ORAL at 17:39

## 2023-01-07 RX ADMIN — GABAPENTIN 200 MG: 100 CAPSULE ORAL at 17:39

## 2023-01-07 RX ADMIN — DRONEDARONE 400 MG: 400 TABLET, FILM COATED ORAL at 17:39

## 2023-01-07 RX ADMIN — SODIUM BICARBONATE 1300 MG: 650 TABLET ORAL at 17:39

## 2023-01-07 RX ADMIN — NICOTINE 14 MG: 14 PATCH TRANSDERMAL at 06:05

## 2023-01-07 RX ADMIN — ENOXAPARIN SODIUM 40 MG: 40 INJECTION SUBCUTANEOUS at 17:39

## 2023-01-07 RX ADMIN — OXYCODONE HYDROCHLORIDE 10 MG: 10 TABLET ORAL at 21:40

## 2023-01-07 RX ADMIN — DOCUSATE SODIUM 50 MG AND SENNOSIDES 8.6 MG 2 TABLET: 8.6; 5 TABLET, FILM COATED ORAL at 06:08

## 2023-01-07 RX ADMIN — OXYCODONE HYDROCHLORIDE 10 MG: 10 TABLET ORAL at 14:12

## 2023-01-07 RX ADMIN — GABAPENTIN 200 MG: 100 CAPSULE ORAL at 06:05

## 2023-01-07 RX ADMIN — DRONEDARONE 400 MG: 400 TABLET, FILM COATED ORAL at 09:08

## 2023-01-07 RX ADMIN — CALCIUM CARBONATE 1000 MG: 500 TABLET, CHEWABLE ORAL at 08:08

## 2023-01-07 RX ADMIN — Medication 220 MG: at 06:05

## 2023-01-07 RX ADMIN — LACTULOSE 15 ML: 20 SOLUTION ORAL at 14:12

## 2023-01-07 RX ADMIN — OXYCODONE HYDROCHLORIDE 10 MG: 10 TABLET ORAL at 06:09

## 2023-01-07 RX ADMIN — OXYCODONE HYDROCHLORIDE 10 MG: 10 TABLET ORAL at 17:39

## 2023-01-07 RX ADMIN — OMEPRAZOLE 20 MG: 20 CAPSULE, DELAYED RELEASE ORAL at 06:05

## 2023-01-07 RX ADMIN — SODIUM BICARBONATE 1300 MG: 650 TABLET ORAL at 06:05

## 2023-01-07 ASSESSMENT — PAIN DESCRIPTION - PAIN TYPE
TYPE: ACUTE PAIN;SURGICAL PAIN
TYPE: ACUTE PAIN;SURGICAL PAIN
TYPE: SURGICAL PAIN
TYPE: ACUTE PAIN;SURGICAL PAIN
TYPE: ACUTE PAIN;SURGICAL PAIN

## 2023-01-07 ASSESSMENT — ENCOUNTER SYMPTOMS
NECK PAIN: 0
WEAKNESS: 1
SHORTNESS OF BREATH: 0
VOMITING: 0
DOUBLE VISION: 0
MYALGIAS: 1
FEVER: 1
DIZZINESS: 0
FEVER: 0
BACK PAIN: 0
BLURRED VISION: 0
CHILLS: 0
DIARRHEA: 0
HEADACHES: 0
SPUTUM PRODUCTION: 0
COUGH: 0
NERVOUS/ANXIOUS: 0
HALLUCINATIONS: 0
PALPITATIONS: 0
NAUSEA: 0
CONSTIPATION: 0
ABDOMINAL PAIN: 0

## 2023-01-07 ASSESSMENT — LIFESTYLE VARIABLES
CONSUMPTION TOTAL: NEGATIVE
SUBSTANCE_ABUSE: 0
TOTAL SCORE: 0
EVER FELT BAD OR GUILTY ABOUT YOUR DRINKING: NO
EVER HAD A DRINK FIRST THING IN THE MORNING TO STEADY YOUR NERVES TO GET RID OF A HANGOVER: NO
TOTAL SCORE: 0
HAVE PEOPLE ANNOYED YOU BY CRITICIZING YOUR DRINKING: NO
HOW MANY TIMES IN THE PAST YEAR HAVE YOU HAD 5 OR MORE DRINKS IN A DAY: 0
HAVE YOU EVER FELT YOU SHOULD CUT DOWN ON YOUR DRINKING: NO
AVERAGE NUMBER OF DAYS PER WEEK YOU HAVE A DRINK CONTAINING ALCOHOL: 0
ALCOHOL_USE: NO
TOTAL SCORE: 0
ON A TYPICAL DAY WHEN YOU DRINK ALCOHOL HOW MANY DRINKS DO YOU HAVE: 0

## 2023-01-07 ASSESSMENT — FIBROSIS 4 INDEX: FIB4 SCORE: 3.28

## 2023-01-07 NOTE — CARE PLAN
The patient is Stable - Low risk of patient condition declining or worsening    Shift Goals  Clinical Goals: Pain Management; Safety; Stable Vitals; Antibiotic Therapy  Patient Goals: Pain Management; Comfort  Family Goals: N/A    Progress made toward(s) clinical / shift goals:      Problem: Pain - Standard  Goal: Alleviation of pain or a reduction in pain to the patient’s comfort goal  Outcome: Progressing     Problem: Fall Risk  Goal: Patient will remain free from falls  Outcome: Progressing     Problem: Skin Integrity  Goal: Skin integrity is maintained or improved  Outcome: Progressing       Patient is not progressing towards the following goals:

## 2023-01-07 NOTE — PROGRESS NOTES
Assumed patient care and received report from Kevin MONTAÑO Assessment completed. Pt A&Ox 4. Respirations are even and unlabored on room air. Pt denies/reports pain at this time. Medical patient, VS stable, call light and belongings within reach. POC updated (IV ABX). Pt educated on room and call light, pt verbalized understanding. Communication board updated. Needs met.

## 2023-01-07 NOTE — PROGRESS NOTES
Hospital Medicine Daily Progress Note    Date of Service  1/7/2023    Chief Complaint  April Alicia is a 62 y.o. female admitted 12/26/2022 with left knee pain    Hospital Course  This is a 62-year-old female with a past medical history significant for cocaine abuse, ethanol abuse complicated by liver cirrhosis, asthma, atrial fibrillation, second hypercoagulable state was on Eliquis, diabetes mellitus, hypertension presented to ER on 12/ planes of increased pain in her left knee associated with fever, chills.    She had hx of bilateral knee surgery In 7/22, was found to have group B streptococcal bacteremia secondary to infection of tibial component of LEFT arthroplasty hardware; she underwent LEFT knee revision, total arthroplasty with antibiotic spacer placed. Plan was then to continue with CTX daily through 8/30/22. Followed with Dr. Gray (of [facility]).    In ER, underwent arthrocentesis, WBC 21,150; culture growing group B strep orthopedic and ID were consulted, underwent surgery with Dr. Mcelroy on 12/20/2022 surgical culture growing group B strep.    She was started on broad-spectrum antibiotic including vancomycin and Unasyn later switched to Rocephin for 3 days for urinary tract infection.  ID was consulted, recommended ancef 7/2023.  While patient remains on IV antibiotics, need CBC, CMP, ESR, CRP.    Hospital course admitted with acute renal failure, which has been improving.  She received PRBC transfusion during the stay in the hospital.    Patient continued to complain of the pain in her left shoulder, MR showing Diminished medium sized joint effusion communicates with the subacromial space and there are marginal erosions. This could indicate recurrent septic arthropathy and osteomyelitis.  Ultrasound noted to have WBC 1051, RBC 815099.    Interval events:  Patient was seen and examined at bedside.  No acute events overnight. Patient is resting comfortably in bed  and in no acute distress.     S/p left total knee explant with spacer and wound vac 12/28  Tmax 101.1 in last 24 hours  Check UA and CT chest, abdomen, pelvis  ID recs   - currently on Rocephin  Hb stable   - Considering patient being anemic and requiring blood transfusion, she would not discharge on Eliquis, she understand the risk of not being on Eliquis including stroke      I have discussed this patient's plan of care and discharge plan at IDT rounds today with Case Management, Nursing, Nursing leadership, and other members of the IDT team.    Consultants/Specialty  infectious disease and orthopedics    Code Status  DNAR/DNI    Disposition  Patient is not medically cleared for discharge.   Anticipate discharge to to an inpatient rehabilitation hospital versus SNF  I have placed the appropriate orders for post-discharge needs.    Review of Systems  Review of Systems   Constitutional:  Positive for malaise/fatigue. Negative for chills and fever.   HENT:  Negative for congestion and hearing loss.    Eyes:  Negative for blurred vision and double vision.   Respiratory:  Negative for cough and shortness of breath.    Cardiovascular:  Negative for chest pain and palpitations.   Gastrointestinal:  Negative for abdominal pain, diarrhea, nausea and vomiting.   Genitourinary:  Negative for dysuria and frequency.   Musculoskeletal:  Positive for joint pain and myalgias.   Skin: Negative.    Neurological:  Positive for weakness. Negative for dizziness and headaches.   Psychiatric/Behavioral:  Negative for hallucinations and substance abuse.    All other systems reviewed and are negative.     Physical Exam  Temp:  [36.9 °C (98.4 °F)-38.4 °C (101.1 °F)] 36.9 °C (98.4 °F)  Pulse:  [59-67] 67  Resp:  [14-17] 17  BP: (100-115)/(50-67) 114/60  SpO2:  [95 %-97 %] 95 %    Physical Exam  Vitals and nursing note reviewed.   Constitutional:       General: She is not in acute distress.     Appearance: Normal appearance. She is not  toxic-appearing.   HENT:      Head: Normocephalic and atraumatic.      Right Ear: External ear normal.      Left Ear: External ear normal.      Nose: No congestion.      Mouth/Throat:      Pharynx: No oropharyngeal exudate.   Eyes:      General: No scleral icterus.     Extraocular Movements: Extraocular movements intact.   Cardiovascular:      Rate and Rhythm: Normal rate and regular rhythm.      Heart sounds: Murmur heard.   Pulmonary:      Effort: Pulmonary effort is normal.      Breath sounds: Rales present. No wheezing.   Abdominal:      General: Bowel sounds are normal.      Palpations: Abdomen is soft.      Tenderness: There is no abdominal tenderness. There is no guarding or rebound.   Musculoskeletal:         General: No swelling or deformity.      Cervical back: Neck supple.      Left knee: Tenderness present.      Comments: L knee wrapped, drain in place   Skin:     Coloration: Skin is not jaundiced.      Findings: No erythema.      Comments: Skin graft scars b/l LEs   Neurological:      Mental Status: She is alert and oriented to person, place, and time. Mental status is at baseline.      Cranial Nerves: No cranial nerve deficit.   Psychiatric:         Mood and Affect: Mood normal.         Behavior: Behavior normal.       Fluids    Intake/Output Summary (Last 24 hours) at 1/7/2023 1122  Last data filed at 1/7/2023 0413  Gross per 24 hour   Intake --   Output 1000 ml   Net -1000 ml       Laboratory  Recent Labs     01/05/23 0518 01/06/23 0122 01/07/23 0147   WBC 5.2 4.9 4.5*   RBC 2.52* 2.60* 2.60*   HEMOGLOBIN 7.3* 7.4* 7.5*   HEMATOCRIT 23.2* 23.9* 23.5*   MCV 92.1 91.9 90.4   MCH 29.0 28.5 28.8   MCHC 31.5* 31.0* 31.9*   RDW 55.9* 55.3* 54.9*   PLATELETCT 116* 123* 107*   MPV 10.2 10.3 10.0     Recent Labs     01/05/23 0518 01/06/23 0122 01/07/23 0147   SODIUM 132* 133* 134*   POTASSIUM 4.1 3.9 4.0   CHLORIDE 108 108 109   CO2 16* 17* 19*   GLUCOSE 111* 139* 221*   BUN 13 11 12   CREATININE 0.74  0.78 0.75   CALCIUM 7.9* 7.9* 7.8*                   Imaging  [id number] VIEW   Final Result      No evidence of bowel obstruction.      DX-CHEST-LIMITED (1 VIEW)   Final Result      1.  Hypoinflation without other evidence for acute cardiopulmonary disease.   2.  Evidence of old granulomatous disease.      DX-INJECT OR ASPIRATE W/O US GUIDANCE-MED JOINT LEFT   Final Result      Successful fluoroscopically-guided aspiration of the left glenohumeral joint with 5 mL bloody fluid sent to the lab.      MR-SHOULDER-W/O LEFT   Final Result      Diminished medium sized joint effusion communicates with the subacromial space and there are marginal erosions. This could indicate recurrent septic arthropathy and osteomyelitis but the appearance is improved from comparison and it could also just be    degenerative      Severe glenohumeral cartilage loss with moderate osteophyte formation and evidence of prior posterior glenoid fracture, chronic posterior humeral head subluxation without dislocation      Posterior and superior glenoid labrum tears, chronic      Complete supraspinatus, subscapularis tears with fatty atrophy      Nonvisualization of the long head biceps intra-articular segment indicating likely chronic tear and retraction      Considerably degraded by motion artifact      DX-KNEE 2- LEFT   Final Result      Antibiotic impregnated radiopaque beads within and about the knee arthroplasty.      DX-SHOULDER 2+ RIGHT   Final Result      1.  Moderately advanced acromioclavicular degenerative changes.   2.  Acromiohumeral narrowing suggesting chronic rotator cuff tears.   3.  No acute fracture or dislocation.      DX-CHEST-PORTABLE (1 VIEW)   Final Result      1.  No acute cardiopulmonary abnormality.   2.  Prior granulomatous exposure.         DX-KNEE 3 VIEWS LEFT   Final Result      1.  Revised left knee arthroplasty is unchanged since prior. No evidence of hardware complication.   2.  No acute fracture or dislocation.    3.  Marked soft tissue swelling and suspected knee effusion.      IR-PICC LINE PLACEMENT W/ GUIDANCE > AGE 5    (Results Pending)   CT-CHEST,ABDOMEN,PELVIS WITH    (Results Pending)          Assessment/Plan  * Septic joint (HCC)- (present on admission)  Assessment & Plan  Patient has a history of group B strep bacteremia secondary to infection of tibial component of left arthroplasty hardware  Status post antibiotic spacer placement by orthopedics (Dr. Luz)   --Comes in with left knee pain and swelling and L shoulder pain   --Arthrocentesis in the ER concerning for septic knee; Orthopedics consulted: I&D 12/28, cultures growing GBS  Infectious disease consulted: recommending Ancef x 6 weeks, end date 2/7/23, needs weekly labs CBC, CMP  MR Shoulder effusion status post arthrocentesis, fluid analysis didn't show any infection      Considering patient spiking up temp despite her being on IV antibiotics, ID was reconsulted 1/5/2022, started on Rocephin on1/5    Fever  Assessment & Plan  Tmax 101.1 in last 24 hours  Check UA and CT chest, abdomen, pelvis  ID following  On Rocephin    Metabolic acidosis  Assessment & Plan  Normal AG, likely in setting of multiple BMs from lactulose  Improving     Anemia of chronic disease  Assessment & Plan  S/p PRBC transfusion x 1.  Iron studies consistent with iron deficiency, continue IV iron   -- hemoglobin at 7.4  If less than 7, will transfuse    Acute cystitis without hematuria- (present on admission)  Assessment & Plan  Patient c/o pain with urination and lower abd discomfort  UA c/f infection  ucx growing e.coli resistant to PCN  S/p completion of treatment     Recheck UA today- continues to be febrile     Atrial fibrillation (HCC)- (present on admission)  Assessment & Plan  Continue home dronedarone 400 mg twice daily  Holding eliquis d/t anemia requiring transfusions   -- Discussed adverse effect of holding Eliquis especially in the light of atrial fibrillation including  stroke, patient states understanding    Cirrhosis (HCC)- (present on admission)  Assessment & Plan  In setting of alcohol use however no EtOH in over a year  Continue home lactulose    Thrombocytopenia (HCC)- (present on admission)  Assessment & Plan  Likely in setting of cirrhosis  Holding Eliquis  Monitor    Acute encephalopathy  Assessment & Plan  improving    Hypomagnesemia  Assessment & Plan  Replete and monitor    Hepatic encephalopathy  Assessment & Plan  Resolved    GERD (gastroesophageal reflux disease)  Assessment & Plan  Continue home omeprazole      Type 2 diabetes mellitus (HCC)  Assessment & Plan  Diet controlled  a1c 5.4  Dc SSI  Regular diet per patient request     ELIZABETH (acute kidney injury) (HCC)  Assessment & Plan  Resolved  Avoid nephrotoxin, creatinine at 0.78, monitor    Hypertension- (present on admission)  Assessment & Plan  Home regimen: Losartan 100 mg daily, felodipine 5 mg q. Evening  Hold as patient Bp is noted to eb on the lower side         VTE prophylaxis: lovenox  I have performed a physical exam and reviewed and updated ROS and Plan today (1/7/2023). In review of yesterday's note (1/6/2023), there are no changes except as documented above.

## 2023-01-07 NOTE — CARE PLAN
Problem: Knowledge Deficit - Standard  Goal: Patient and family/care givers will demonstrate understanding of plan of care, disease process/condition, diagnostic tests and medications  Outcome: Progressing     Problem: Pain - Standard  Goal: Alleviation of pain or a reduction in pain to the patient’s comfort goal  Outcome: Progressing     Problem: Fall Risk  Goal: Patient will remain free from falls  Outcome: Progressing     Problem: Skin Integrity  Goal: Skin integrity is maintained or improved  Outcome: Progressing     Problem: Communication  Goal: The ability to communicate needs accurately and effectively will improve  Outcome: Progressing     Problem: Bowel Elimination  Goal: Establish and maintain regular bowel function  Outcome: Progressing   The patient is Stable - Low risk of patient condition declining or worsening    Shift Goals  Clinical Goals: pain control  Patient Goals: rest  Family Goals: EDMUND    Progress made toward(s) clinical / shift goals:  patient updated on POC    Patient is not progressing towards the following goals:

## 2023-01-07 NOTE — ASSESSMENT & PLAN NOTE
Afebrile last 24 hours  CT chest, abdomen, pelvis with no acute process  ID following  On Rocephin

## 2023-01-07 NOTE — PROGRESS NOTES
Infectious Disease Progress Note    Author: Shantal Shahid M.D. Date & Time of service: 2023  10:31 AM    Chief Complaint:  Follow-up for prosthetic knee joint infection     Interval History:   T-max 100.9, white count 6000, antibiotic switched to ceftriaxone due to complaining dysuria and positive urine culture for Unasyn intermediate E. coli.  OR cultures positive as below   TMax 101.1, WBC 4.5 patient not feeling well today.  Continues to feel fevers.  Has ongoing dysuria.    Review of Systems:  Review of Systems   Constitutional:  Positive for fever and malaise/fatigue. Negative for chills.   Respiratory:  Negative for cough, sputum production and shortness of breath.    Cardiovascular:  Negative for chest pain.   Gastrointestinal:  Negative for abdominal pain, constipation, diarrhea, nausea and vomiting.   Genitourinary:  Positive for dysuria.   Musculoskeletal:  Positive for joint pain and myalgias. Negative for back pain and neck pain.   Neurological:  Positive for weakness. Negative for dizziness and headaches.   Psychiatric/Behavioral:  The patient is not nervous/anxious.      Hemodynamics:  Temp (24hrs), Av.6 °C (99.7 °F), Min:36.9 °C (98.4 °F), Max:38.4 °C (101.1 °F)  Temperature: 36.9 °C (98.4 °F)  Pulse  Av.8  Min: 51  Max: 88   Blood Pressure: 114/60       Physical Exam:  Physical Exam  Vitals and nursing note reviewed.   HENT:      Mouth/Throat:      Mouth: Mucous membranes are moist.      Comments: Edentulous  Eyes:      Extraocular Movements: Extraocular movements intact.      Pupils: Pupils are equal, round, and reactive to light.   Cardiovascular:      Rate and Rhythm: Normal rate and regular rhythm.      Heart sounds: Normal heart sounds.   Pulmonary:      Effort: Pulmonary effort is normal.      Breath sounds: Normal breath sounds.   Abdominal:      General: Abdomen is flat. Bowel sounds are normal. There is no distension.      Palpations: Abdomen is soft.       Tenderness: There is no abdominal tenderness.   Musculoskeletal:      Right lower leg: Edema present.      Comments: Left knee in surgical bandages and brace   Skin:     General: Skin is warm and dry.   Neurological:      General: No focal deficit present.      Mental Status: She is alert and oriented to person, place, and time.   Psychiatric:         Mood and Affect: Mood normal.         Behavior: Behavior normal.       Meds:    Current Facility-Administered Medications:     sodium bicarbonate    enoxaparin (LOVENOX) injection    cefTRIAXone (ROCEPHIN) IV    zinc sulfate    calcium carbonate    oxyCODONE immediate-release **OR** oxyCODONE immediate-release    lactulose    ondansetron    albuterol    atorvastatin    dronedarone    omeprazole    vitamin D2 (Ergocalciferol)    Pharmacy Consult Request    gabapentin    senna-docusate **AND** polyethylene glycol/lytes **AND** magnesium hydroxide **AND** bisacodyl    acetaminophen    nicotine **AND** Nicotine Replacement Patient Education Materials **AND** nicotine polacrilex    Labs:  Recent Labs     01/05/23 0518 01/06/23 0122 01/07/23 0147   WBC 5.2 4.9 4.5*   RBC 2.52* 2.60* 2.60*   HEMOGLOBIN 7.3* 7.4* 7.5*   HEMATOCRIT 23.2* 23.9* 23.5*   MCV 92.1 91.9 90.4   MCH 29.0 28.5 28.8   RDW 55.9* 55.3* 54.9*   PLATELETCT 116* 123* 107*   MPV 10.2 10.3 10.0       Recent Labs     01/05/23 0518 01/06/23 0122 01/07/23  0147   SODIUM 132* 133* 134*   POTASSIUM 4.1 3.9 4.0   CHLORIDE 108 108 109   CO2 16* 17* 19*   GLUCOSE 111* 139* 221*   BUN 13 11 12       Recent Labs     01/05/23 0518 01/06/23  0122 01/07/23  0147   ALBUMIN 2.2* 1.9* 2.0*   TBILIRUBIN 0.5  --   --    ALKPHOSPHAT 158*  --   --    TOTPROTEIN 5.6*  --   --    ALTSGPT 8  --   --    ASTSGOT 16  --   --    CREATININE 0.74 0.78 0.75         Imaging:  DX-CHEST-LIMITED (1 VIEW)    Result Date: 1/5/2023 1/5/2023 10:08 AM HISTORY/REASON FOR EXAM:  Shortness of Breath TECHNIQUE/EXAM DESCRIPTION AND NUMBER OF  VIEWS: Single portable view of the chest. COMPARISON: 12/26/2022 FINDINGS: Cardiac mediastinal contour is unchanged. Lungs show hypoinflation. Calcified RIGHT hilar lymph nodes. No focal consolidation. No pleural fluid collection or pneumothorax. No major bony abnormality is seen.     1.  Hypoinflation without other evidence for acute cardiopulmonary disease. 2.  Evidence of old granulomatous disease.    DX-CHEST-PORTABLE (1 VIEW)    Result Date: 12/26/2022 12/26/2022 9:38 PM HISTORY/REASON FOR EXAM:  Sepsis. TECHNIQUE/EXAM DESCRIPTION AND NUMBER OF VIEWS: Single portable view of the chest. COMPARISON:  7/20/2022. FINDINGS: LUNGS: Clear. No effusions. PNEUMOTHORAX: None. LINES AND TUBES: None. MEDIASTINUM: No cardiomegaly. Atherosclerosis. Several mediastinal granulomas. MUSCULOSKELETAL STRUCTURES: No acute displaced fracture.     1.  No acute cardiopulmonary abnormality. 2.  Prior granulomatous exposure.     DX-INJECT OR ASPIRATE W/O US GUIDANCE-MED JOINT LEFT    Result Date: 1/2/2023 1/2/2023 4:16 PM HISTORY/REASON FOR EXAM:  Pain. Joint effusion TECHNIQUE/EXAM DESCRIPTION: Fluoroscopic guidance used for left glenohumeral joint aspiration.  Total fluoroscopy time was 0.3 minutes The joint was localized using fluoroscopic guidance for injection after obtaining informed consent. A timeout was taken. After sterile preparation and drape procedures were followed, local anesthesia was achieved with 1% lidocaine.  A 20-gauge spinal needle was advanced into the joint. Approximately 5 mL bloody fluid was aspirated. Total fluoroscopic images: 1 fluoroscopic images obtained. Fluoroscopy dose(DAP): 0.107 Gy*cm^2     Successful fluoroscopically-guided aspiration of the left glenohumeral joint with 5 mL bloody fluid sent to the lab.    DX-KNEE 2- LEFT    Result Date: 12/28/2022 12/28/2022 4:07 PM HISTORY/REASON FOR EXAM: . Knee arthroplasty TECHNIQUE/EXAM DESCRIPTION AND NUMBER OF VIEWS:  2 views of the LEFT knee.  COMPARISON: 2/26/2022 FINDINGS: Knee arthroplasty is again noted. Interval placement of radiopaque presumed antibiotic impregnated beads within and around the knee. There is soft tissue gas. Soft tissue swelling and presumed joint effusion is likely improved..     Antibiotic impregnated radiopaque beads within and about the knee arthroplasty.    DX-KNEE 3 VIEWS LEFT    Result Date: 12/26/2022 12/26/2022 9:38 PM HISTORY/REASON FOR EXAM:  Left knee pain TECHNIQUE/EXAM DESCRIPTION AND NUMBER OF VIEWS:  3 views of the LEFT knee. COMPARISON: 10/27/2022 FINDINGS: BONE MINERALIZATION: Normal. JOINTS: Suspected knee effusion. Revised left knee arthroplasty is unchanged. No evidence of hardware complication. FRACTURE: None. DISLOCATION: None. SOFT TISSUES: Marked soft tissue swelling. Atherosclerosis.     1.  Revised left knee arthroplasty is unchanged since prior. No evidence of hardware complication. 2.  No acute fracture or dislocation. 3.  Marked soft tissue swelling and suspected knee effusion.    DX-SHOULDER 2+ RIGHT    Result Date: 12/28/2022 12/28/2022 9:43 AM HISTORY/REASON FOR EXAM:  Pain/Deformity Following Trauma. TECHNIQUE/EXAM DESCRIPTION AND NUMBER OF VIEWS:  3 views of the RIGHT shoulder. COMPARISON: None FINDINGS: There is no fracture or dislocation. Narrowing of the acromiohumeral interval. At least moderate degenerative changes of the acromioclavicular joint, mild of the glenohumeral joint. Bone mineralization is decreased..     1.  Moderately advanced acromioclavicular degenerative changes. 2.  Acromiohumeral narrowing suggesting chronic rotator cuff tears. 3.  No acute fracture or dislocation.    MR-SHOULDER-W/O LEFT    Result Date: 1/1/2023 1/1/2023 3:44 PM HISTORY/REASON FOR EXAM: TECHNIQUE/EXAM DESCRIPTION: MRI of the LEFT shoulder without contrast. The study was performed on a [employer] 1.5 Suzanne MRI scanner. T1 sagittal, fast spin-echo T2 fat-suppressed oblique coronal, sagittal, and axial and  intermediate fast spin-echo oblique coronal images were obtained. COMPARISON: 7/16/2022 FINDINGS: There is severe motion artifact Glenohumeral joint space: There is a medium sized joint effusion. This represents improvement.  This communicates with the subdeltoid space. Rotator cuff: Complete supraspinatus tendon tear with retraction and fatty atrophy. Complete subscapularis tear with retraction and fatty atrophy. Infraspinatus and subscapularis do not demonstrate fatty atrophy. No gross tear is seen but there is edema involving the muscle bellies Labrum: The superior and posterior labrum is heterogeneous with increased signal and some volume loss. The long head biceps is seen below the bicipital groove. It is not confirmed above. Osseous structures/cartilage: Severe cartilage loss with probable articulation areas of subchondral edema. There are erosions which are marginal. Again the humeral head is dorsally subluxed with some dorsal glenoid spurring. A chronic posterior glenoid fracture is likely. Humerus is high riding. There appears to have been prior acromioplasty and Shania procedures favored over chronic grade 2 separation.     Diminished medium sized joint effusion communicates with the subacromial space and there are marginal erosions. This could indicate recurrent septic arthropathy and osteomyelitis but the appearance is improved from comparison and it could also just be degenerative Severe glenohumeral cartilage loss with moderate osteophyte formation and evidence of prior posterior glenoid fracture, chronic posterior humeral head subluxation without dislocation Posterior and superior glenoid labrum tears, chronic Complete supraspinatus, subscapularis tears with fatty atrophy Nonvisualization of the long head biceps intra-articular segment indicating likely chronic tear and retraction Considerably degraded by motion artifact      Micro:  Results       Procedure Component Value Units Date/Time    COV-2,  FLU A/B, AND RSV BY PCR (2-4 HOURS CEPHEID): Collect NP swab in VTM [[id number]] Collected: 01/05/23 1531    Order Status: Completed Specimen: Respirate from Nasopharyngeal Updated: 01/05/23 1629     Influenza virus A RNA Negative     Influenza virus B, PCR Negative     RSV, PCR Negative     SARS-CoV-2 by PCR NotDetected     Comment: [facility] providers: PLEASE REFER TO DE-ESCALATION AND RETESTING PROTOCOL  on [email or web address]    **The [employer] GeneXpert Xpress SARS-CoV-2 RT-PCR Test has been made  available for use under the Emergency Use Authorization (EUA) only.          SARS-CoV-2 Source NP Swab    Narrative:      Collected By: [id number] JUAN CARLOS RAMOS    URINALYSIS [[id number]]  (Abnormal) Collected: 01/05/23 1515    Order Status: Completed Specimen: Urine, Clean Catch Updated: 01/05/23 1603     Color Yellow     Character Clear     Specific Gravity 1.014     Ph 6.5     Glucose Negative mg/dL      Ketones Negative mg/dL      Protein Negative mg/dL      Bilirubin Negative     Urobilinogen, Urine 0.2     Nitrite Negative     Leukocyte Esterase Negative     Occult Blood Small     Micro Urine Req Microscopic    Narrative:      Collected By: [id number] JUAN CARLOS RAMOS    FLUID CULTURE W/GRAM STAIN [[id number]] Collected: 01/02/23 1545    Order Status: Completed Specimen: Synovial Updated: 01/05/23 0945     Significant Indicator NEG     Source SYNO     Site Left Shoulder     Culture Result No growth at 72 hours.     Gram Stain Result Rare WBCs.  No organisms seen.      BLOOD CULTURE [[id number]] Collected: 01/03/23 0842    Order Status: Completed Specimen: Blood from Peripheral Updated: 01/04/23 0728     Significant Indicator NEG     Source BLD     Site PERIPHERAL     Culture Result No Growth  Note: Blood cultures are incubated for 5 days and  are monitored continuously.Positive blood cultures  are called to the RN and reported as soon as  they are identified.      Narrative:      Per Hospital Policy: Only change Specimen  "Src: to \"Line\" if  specified by physician order.  Right Hand    BLOOD CULTURE [[id number]] Collected: 01/03/23 0842    Order Status: Completed Specimen: Blood from Peripheral Updated: 01/04/23 0728     Significant Indicator NEG     Source BLD     Site PERIPHERAL     Culture Result No Growth  Note: Blood cultures are incubated for 5 days and  are monitored continuously.Positive blood cultures  are called to the RN and reported as soon as  they are identified.      Narrative:      Per Hospital Policy: Only change Specimen Src: to \"Line\" if  specified by physician order.  Left Forearm/Arm    URINALYSIS [[id number]]  (Abnormal) Collected: 01/03/23 1952    Order Status: Completed Specimen: Urine, Cath Updated: 01/03/23 2013     Color Yellow     Character Clear     Specific Gravity 1.020     Ph 5.5     Glucose Negative mg/dL      Ketones Negative mg/dL      Protein Negative mg/dL      Bilirubin Negative     Urobilinogen, Urine 0.2     Nitrite Negative     Leukocyte Esterase Negative     Occult Blood Small     Micro Urine Req Microscopic    Narrative:      Collected By: [id number] CECI BRANDT    GRAM STAIN [[id number]] Collected: 01/02/23 1545    Order Status: Completed Specimen: Synovial Updated: 01/02/23 1836     Significant Indicator .     Source SYNO     Site Left Shoulder     Gram Stain Result Rare WBCs.  No organisms seen.      FLUID CULTURE W/GRAM STAIN [[id number]]     Order Status: No result Specimen: Body Fluid from Synovial Fluid     Blood Culture [[id number]] Collected: 12/26/22 2202    Order Status: Completed Specimen: Blood from Peripheral Updated: 12/31/22 2300     Significant Indicator NEG     Source BLD     Site PERIPHERAL     Culture Result No growth after 5 days of incubation.    Narrative:      1 of 2 for Blood Culture x 2 sites order. Per Hospital  Policy: Only change Specimen Src: to \"Line\" if specified by  physician order.  No site indicated    Blood Culture [[id number]] Collected: 12/26/22 2225    Order " "Status: Completed Specimen: Blood from Peripheral Updated: 12/31/22 2300     Significant Indicator NEG     Source BLD     Site PERIPHERAL     Culture Result No growth after 5 days of incubation.    Narrative:      2 of 2 blood culture x2  Sites order. Per Hospital Policy:  Only change Specimen Src: to \"Line\" if specified by physician  order.  Left Hand    CULTURE TISSUE W/ GRM STAIN [[id number]]  (Abnormal) Collected: 12/28/22 1351    Order Status: Completed Specimen: Tissue Updated: 12/31/22 1205     Significant Indicator POS     Source TISS     Site Left anterior tibia     Culture Result -     Gram Stain Result Rare WBCs.  No organisms seen.       Culture Result Streptococcus agalactiae (Group B)  Light growth      Narrative:      Surgery Specimen    Anaerobic Culture [[id number]] Collected: 12/28/22 1351    Order Status: Completed Specimen: Tissue Updated: 12/31/22 1205     Significant Indicator NEG     Source TISS     Site Left anterior tibia     Culture Result No Anaerobes isolated.    Narrative:      Surgery Specimen    CULTURE TISSUE W/ GRM STAIN [[id number]]  (Abnormal) Collected: 12/28/22 1350    Order Status: Completed Specimen: Tissue Updated: 12/31/22 1201     Significant Indicator POS     Source TISS     Site Left knee lateral gutter     Culture Result -     Gram Stain Result Many WBCs.  No organisms seen.       Culture Result Streptococcus agalactiae (Group B)  Light growth      Narrative:      Surgery Specimen    Anaerobic Culture [[id number]] Collected: 12/28/22 1350    Order Status: Completed Specimen: Tissue Updated: 12/31/22 1201     Significant Indicator NEG     Source TISS     Site Left knee lateral gutter     Culture Result No Anaerobes isolated.    Narrative:      Surgery Specimen    CULTURE TISSUE W/ GRM STAIN [[id number]]  (Abnormal) Collected: 12/28/22 1432    Order Status: Completed Specimen: Tissue Updated: 12/31/22 1200     Significant Indicator POS     Source TISS     Site Left femur     " Culture Result -     Gram Stain Result Rare WBCs.  No organisms seen.       Culture Result Streptococcus agalactiae (Group B)  Light growth      Narrative:      Surgery Specimen    Anaerobic Culture [[id number]] Collected: 12/28/22 1432    Order Status: Completed Specimen: Tissue Updated: 12/31/22 1200     Significant Indicator NEG     Source TISS     Site Left femur     Culture Result No Anaerobes isolated.    Narrative:      Surgery Specimen    CULTURE TISSUE W/ GRM STAIN [[id number]]  (Abnormal) Collected: 12/28/22 1433    Order Status: Completed Specimen: Tissue Updated: 12/31/22 1159     Significant Indicator POS     Source TISS     Site Left Tibia     Culture Result -     Gram Stain Result Many WBCs.  No organisms seen.       Culture Result Streptococcus agalactiae (Group B)  Light growth      Narrative:      Surgery Specimen    Anaerobic Culture [[id number]] Collected: 12/28/22 1433    Order Status: Completed Specimen: Tissue Updated: 12/31/22 1159     Significant Indicator NEG     Source TISS     Site Left Tibia     Culture Result No Anaerobes isolated.    Narrative:      Surgery Specimen    CULTURE TISSUE W/ GRM STAIN [[id number]]  (Abnormal) Collected: 12/28/22 1349    Order Status: Completed Specimen: Tissue Updated: 12/31/22 1159     Significant Indicator POS     Source TISS     Site Left Knee Medial Gutter     Culture Result -     Gram Stain Result Rare WBCs.  No organisms seen.       Culture Result Streptococcus agalactiae (Group B)  Light growth      Narrative:      Surgery Specimen    Anaerobic Culture [[id number]] Collected: 12/28/22 1349    Order Status: Completed Specimen: Tissue Updated: 12/31/22 1159     Significant Indicator NEG     Source TISS     Site Left Knee Medial Gutter     Culture Result No Anaerobes isolated.    Narrative:      Surgery Specimen            Assessment:  April Alicia is a 62 y.o. female patient with history of alcohol and cocaine abuse, recently admitted with left  knee prosthetic joint infection and left shoulder septic arthritis status post I&D of the left shoulder and removal of left knee hardware with placement of antibiotic spacer, completed 6 weeks of IV ceftriaxone at a facility on 8/30/2022.  She unfortunately was lost to follow-up and could not make several follow-up appointments, either with ID or orthopedics.  She is now readmitted with recurrent infection of the left knee spacer     Pertinent Diagnoses:  Fevers, started on 1/2 and ongoing  - Patient still with shoulder pain and some mild discomfort in knee.  Denies any headache, sinus pain, mouth or tooth pain, cough or shortness of breath.  Denying any current abdominal pain or nausea.  No diarrhea.  She is still complaining of some dysuria.  No rash.    Left knee chronic prosthetic joint infection  Group B strep infection  UTI, E. coli  Recent left shoulder septic arthritis  History of alcohol and cocaine abuse     Plan:  -Patient was taken to the OR on 12/28, underwent explantation and placement of articulating spacer, wound VAC.  Multiple OR cultures again growing group B Strep including from tissue and femoral canal.  -Continue IV ceftriaxone 2 g daily  -Recommend CT chest, abdomen & pelvis and repeat urinalysis with culture --- Unfortunately the patient missed 2 days of antibiotics from 1/1-1/2 and then was placed on cefazolin on 1/3.  Fever started 1/2 but has continued despite antibiotics   --- Has been complaining of shoulder pain, MRI left shoulder without contrast shows diminished medium size joint effusion which communicates with the subacromial space and marginal erosions.  Underwent synovial fluid removal from the left shoulder on 1/2/2022 WBC 1051, 40% polys, 40% lymphs, not consistent with infection.  Cultures-no growth.  --- Blood cultures on 1/3 of no growth to date  -PICC line ordered today     Disposition: Recommend completing 6 weeks of IV antibiotics through 2/7/2023. At least weekly CBC with  differential, CMP while on IV antibiotics.  Not cleared for discharge.     Need for PICC line: Yes     Discussed with internal medicine, Dr. Martínez.      ID will follow. Please call with questions.  ID clinic follow-up in 4 to 6 weeks

## 2023-01-08 LAB
ALBUMIN SERPL BCP-MCNC: 2.3 G/DL (ref 3.2–4.9)
BACTERIA BLD CULT: NORMAL
BACTERIA BLD CULT: NORMAL
BASOPHILS # BLD AUTO: 1.2 % (ref 0–1.8)
BASOPHILS # BLD: 0.06 K/UL (ref 0–0.12)
BUN SERPL-MCNC: 11 MG/DL (ref 8–22)
CALCIUM ALBUM COR SERPL-MCNC: 9.3 MG/DL (ref 8.5–10.5)
CALCIUM SERPL-MCNC: 7.9 MG/DL (ref 8.5–10.5)
CHLORIDE SERPL-SCNC: 106 MMOL/L (ref 96–112)
CO2 SERPL-SCNC: 18 MMOL/L (ref 20–33)
CREAT SERPL-MCNC: 0.65 MG/DL (ref 0.5–1.4)
EOSINOPHIL # BLD AUTO: 0.1 K/UL (ref 0–0.51)
EOSINOPHIL NFR BLD: 2 % (ref 0–6.9)
ERYTHROCYTE [DISTWIDTH] IN BLOOD BY AUTOMATED COUNT: 55.9 FL (ref 35.9–50)
GFR SERPLBLD CREATININE-BSD FMLA CKD-EPI: 99 ML/MIN/1.73 M 2
GLUCOSE SERPL-MCNC: 140 MG/DL (ref 65–99)
HCT VFR BLD AUTO: 24.7 % (ref 37–47)
HGB BLD-MCNC: 7.8 G/DL (ref 12–16)
IMM GRANULOCYTES # BLD AUTO: 0.03 K/UL (ref 0–0.11)
IMM GRANULOCYTES NFR BLD AUTO: 0.6 % (ref 0–0.9)
LYMPHOCYTES # BLD AUTO: 1 K/UL (ref 1–4.8)
LYMPHOCYTES NFR BLD: 20.5 % (ref 22–41)
MAGNESIUM SERPL-MCNC: 1.5 MG/DL (ref 1.5–2.5)
MCH RBC QN AUTO: 28.9 PG (ref 27–33)
MCHC RBC AUTO-ENTMCNC: 31.6 G/DL (ref 33.6–35)
MCV RBC AUTO: 91.5 FL (ref 81.4–97.8)
MONOCYTES # BLD AUTO: 0.37 K/UL (ref 0–0.85)
MONOCYTES NFR BLD AUTO: 7.6 % (ref 0–13.4)
NEUTROPHILS # BLD AUTO: 3.32 K/UL (ref 2–7.15)
NEUTROPHILS NFR BLD: 68.1 % (ref 44–72)
NRBC # BLD AUTO: 0 K/UL
NRBC BLD-RTO: 0 /100 WBC
PHOSPHATE SERPL-MCNC: 2.9 MG/DL (ref 2.5–4.5)
PLATELET # BLD AUTO: 124 K/UL (ref 164–446)
PMV BLD AUTO: 10.8 FL (ref 9–12.9)
POTASSIUM SERPL-SCNC: 4.2 MMOL/L (ref 3.6–5.5)
RBC # BLD AUTO: 2.7 M/UL (ref 4.2–5.4)
SIGNIFICANT IND 70042: NORMAL
SIGNIFICANT IND 70042: NORMAL
SITE SITE: NORMAL
SITE SITE: NORMAL
SODIUM SERPL-SCNC: 133 MMOL/L (ref 135–145)
SOURCE SOURCE: NORMAL
SOURCE SOURCE: NORMAL
WBC # BLD AUTO: 4.9 K/UL (ref 4.8–10.8)

## 2023-01-08 PROCEDURE — 770001 HCHG ROOM/CARE - MED/SURG/GYN PRIV*

## 2023-01-08 PROCEDURE — 99232 SBSQ HOSP IP/OBS MODERATE 35: CPT | Performed by: INTERNAL MEDICINE

## 2023-01-08 PROCEDURE — 80069 RENAL FUNCTION PANEL: CPT

## 2023-01-08 PROCEDURE — A9270 NON-COVERED ITEM OR SERVICE: HCPCS | Performed by: STUDENT IN AN ORGANIZED HEALTH CARE EDUCATION/TRAINING PROGRAM

## 2023-01-08 PROCEDURE — A9270 NON-COVERED ITEM OR SERVICE: HCPCS | Performed by: NURSE PRACTITIONER

## 2023-01-08 PROCEDURE — 700111 HCHG RX REV CODE 636 W/ 250 OVERRIDE (IP): Performed by: INTERNAL MEDICINE

## 2023-01-08 PROCEDURE — 700111 HCHG RX REV CODE 636 W/ 250 OVERRIDE (IP): Performed by: HOSPITALIST

## 2023-01-08 PROCEDURE — 700102 HCHG RX REV CODE 250 W/ 637 OVERRIDE(OP): Performed by: NURSE PRACTITIONER

## 2023-01-08 PROCEDURE — A9270 NON-COVERED ITEM OR SERVICE: HCPCS | Performed by: HOSPITALIST

## 2023-01-08 PROCEDURE — 36415 COLL VENOUS BLD VENIPUNCTURE: CPT

## 2023-01-08 PROCEDURE — A9270 NON-COVERED ITEM OR SERVICE: HCPCS | Performed by: INTERNAL MEDICINE

## 2023-01-08 PROCEDURE — 700102 HCHG RX REV CODE 250 W/ 637 OVERRIDE(OP): Performed by: STUDENT IN AN ORGANIZED HEALTH CARE EDUCATION/TRAINING PROGRAM

## 2023-01-08 PROCEDURE — 700102 HCHG RX REV CODE 250 W/ 637 OVERRIDE(OP): Performed by: HOSPITALIST

## 2023-01-08 PROCEDURE — 85025 COMPLETE CBC W/AUTO DIFF WBC: CPT

## 2023-01-08 PROCEDURE — 700102 HCHG RX REV CODE 250 W/ 637 OVERRIDE(OP): Performed by: INTERNAL MEDICINE

## 2023-01-08 PROCEDURE — 83735 ASSAY OF MAGNESIUM: CPT

## 2023-01-08 RX ADMIN — NICOTINE 14 MG: 14 PATCH TRANSDERMAL at 05:03

## 2023-01-08 RX ADMIN — SODIUM BICARBONATE 1300 MG: 650 TABLET ORAL at 05:03

## 2023-01-08 RX ADMIN — GABAPENTIN 200 MG: 100 CAPSULE ORAL at 16:55

## 2023-01-08 RX ADMIN — GABAPENTIN 200 MG: 100 CAPSULE ORAL at 05:03

## 2023-01-08 RX ADMIN — DOCUSATE SODIUM 50 MG AND SENNOSIDES 8.6 MG 2 TABLET: 8.6; 5 TABLET, FILM COATED ORAL at 16:55

## 2023-01-08 RX ADMIN — DRONEDARONE 400 MG: 400 TABLET, FILM COATED ORAL at 16:55

## 2023-01-08 RX ADMIN — OXYCODONE HYDROCHLORIDE 10 MG: 10 TABLET ORAL at 04:37

## 2023-01-08 RX ADMIN — CEFTRIAXONE SODIUM 2000 MG: 10 INJECTION, POWDER, FOR SOLUTION INTRAVENOUS at 05:10

## 2023-01-08 RX ADMIN — LACTULOSE 15 ML: 20 SOLUTION ORAL at 14:38

## 2023-01-08 RX ADMIN — DOCUSATE SODIUM 50 MG AND SENNOSIDES 8.6 MG 2 TABLET: 8.6; 5 TABLET, FILM COATED ORAL at 05:03

## 2023-01-08 RX ADMIN — CALCIUM CARBONATE 1000 MG: 500 TABLET, CHEWABLE ORAL at 09:05

## 2023-01-08 RX ADMIN — OXYCODONE HYDROCHLORIDE 10 MG: 10 TABLET ORAL at 09:04

## 2023-01-08 RX ADMIN — Medication 220 MG: at 05:03

## 2023-01-08 RX ADMIN — CALCIUM CARBONATE 1000 MG: 500 TABLET, CHEWABLE ORAL at 04:38

## 2023-01-08 RX ADMIN — ENOXAPARIN SODIUM 40 MG: 40 INJECTION SUBCUTANEOUS at 16:55

## 2023-01-08 RX ADMIN — SODIUM BICARBONATE 1300 MG: 650 TABLET ORAL at 16:55

## 2023-01-08 RX ADMIN — OMEPRAZOLE 20 MG: 20 CAPSULE, DELAYED RELEASE ORAL at 05:04

## 2023-01-08 RX ADMIN — DRONEDARONE 400 MG: 400 TABLET, FILM COATED ORAL at 08:58

## 2023-01-08 RX ADMIN — OXYCODONE HYDROCHLORIDE 10 MG: 10 TABLET ORAL at 16:55

## 2023-01-08 RX ADMIN — OXYCODONE HYDROCHLORIDE 10 MG: 10 TABLET ORAL at 20:55

## 2023-01-08 RX ADMIN — ATORVASTATIN CALCIUM 20 MG: 20 TABLET, FILM COATED ORAL at 20:55

## 2023-01-08 RX ADMIN — OMEPRAZOLE 20 MG: 20 CAPSULE, DELAYED RELEASE ORAL at 16:55

## 2023-01-08 RX ADMIN — CALCIUM CARBONATE 1000 MG: 500 TABLET, CHEWABLE ORAL at 20:55

## 2023-01-08 ASSESSMENT — ENCOUNTER SYMPTOMS
SHORTNESS OF BREATH: 0
HALLUCINATIONS: 0
ABDOMINAL PAIN: 1
WEAKNESS: 1
DOUBLE VISION: 0
VOMITING: 0
CHILLS: 0
PALPITATIONS: 0
DIZZINESS: 0
FEVER: 0
BLURRED VISION: 0
COUGH: 0
MYALGIAS: 1
NAUSEA: 0
HEADACHES: 0
DIARRHEA: 0

## 2023-01-08 ASSESSMENT — PAIN DESCRIPTION - PAIN TYPE
TYPE: SURGICAL PAIN

## 2023-01-08 ASSESSMENT — LIFESTYLE VARIABLES: SUBSTANCE_ABUSE: 0

## 2023-01-08 NOTE — CARE PLAN
The patient is Stable - Low risk of patient condition declining or worsening    Shift Goals  Clinical Goals: Pain control, safety  Patient Goals: Pain control, rest  Family Goals: Not present    Progress made toward(s) clinical / shift goals:  Patient educated on the pain scale and to notify RN if pain remains uncontrolled. Patient verbalizes pain control using PRN oxycodone. Patient medicated per MAR.    Patient is not progressing towards the following goals:

## 2023-01-08 NOTE — ASSESSMENT & PLAN NOTE
>>ASSESSMENT AND PLAN FOR CIRRHOSIS, ALCOHOLIC ([facility]CC) WRITTEN ON 1/8/2023  7:09 AM BY MEGAN CABA D.O.    As confirmed by imaging    >>ASSESSMENT AND PLAN FOR CIRRHOSIS ([facility]CC) WRITTEN ON 12/27/2022  2:35 PM BY RADHA FONTENOT M.D.    In setting of alcohol use however no EtOH in over a year  Continue home lactulose

## 2023-01-08 NOTE — PROGRESS NOTES
Hospital Medicine Daily Progress Note    Date of Service  1/8/2023    Chief Complaint  April Alicia is a 62 y.o. female admitted 12/26/2022 with left knee pain    Hospital Course  This is a 62-year-old female with a past medical history significant for cocaine abuse, ethanol abuse complicated by liver cirrhosis, asthma, atrial fibrillation, second hypercoagulable state was on Eliquis, diabetes mellitus, hypertension presented to ER on 12/ planes of increased pain in her left knee associated with fever, chills.    She had hx of bilateral knee surgery In 7/22, was found to have group B streptococcal bacteremia secondary to infection of tibial component of LEFT arthroplasty hardware; she underwent LEFT knee revision, total arthroplasty with antibiotic spacer placed. Plan was then to continue with CTX daily through 8/30/22. Followed with Dr. Gray (of [facility]).    In ER, underwent arthrocentesis, WBC 21,150; culture growing group B strep orthopedic and ID were consulted, underwent surgery with Dr. Mcelroy on 12/20/2022 surgical culture growing group B strep.    She was started on broad-spectrum antibiotic including vancomycin and Unasyn later switched to Rocephin for 3 days for urinary tract infection.  ID was consulted, recommended ancef 7/2023.  While patient remains on IV antibiotics, need CBC, CMP, ESR, CRP.    Hospital course admitted with acute renal failure, which has been improving.  She received PRBC transfusion during the stay in the hospital.    Patient continued to complain of the pain in her left shoulder, MR showing Diminished medium sized joint effusion communicates with the subacromial space and there are marginal erosions. This could indicate recurrent septic arthropathy and osteomyelitis.  Ultrasound noted to have WBC 1051, RBC 026520.    Interval events:  Patient was seen and examined at bedside.  No acute events overnight. Patient is resting comfortably in bed  and in no acute distress.     S/p left total knee explant with spacer and wound vac 12/28  Tmax 101.1 in last 24 hours  ID recs   - currently on Rocephin - continue thru 02/07  Hb stable   - Considering patient being anemic and requiring blood transfusion, she would not discharge on Eliquis, she understand the risk of not being on Eliquis including stroke  PICC to be placed tomorrow  SNF placement    I have discussed this patient's plan of care and discharge plan at IDT rounds today with Case Management, Nursing, Nursing leadership, and other members of the IDT team.    Consultants/Specialty  infectious disease and orthopedics    Code Status  DNAR/DNI    Disposition  Patient is not medically cleared for discharge.   Anticipate discharge to to an inpatient rehabilitation hospital versus SNF  I have placed the appropriate orders for post-discharge needs.    Review of Systems  Review of Systems   Constitutional:  Positive for malaise/fatigue. Negative for chills and fever.   HENT:  Negative for congestion and hearing loss.    Eyes:  Negative for blurred vision and double vision.   Respiratory:  Negative for cough and shortness of breath.    Cardiovascular:  Negative for chest pain and palpitations.   Gastrointestinal:  Positive for abdominal pain. Negative for diarrhea, nausea and vomiting.   Genitourinary:  Negative for dysuria and frequency.   Musculoskeletal:  Positive for joint pain and myalgias.   Skin:  Negative for rash.   Neurological:  Positive for weakness. Negative for dizziness and headaches.   Psychiatric/Behavioral:  Negative for hallucinations and substance abuse.    All other systems reviewed and are negative.     Physical Exam  Temp:  [36.9 °C (98.4 °F)-37.7 °C (99.9 °F)] 36.9 °C (98.4 °F)  Pulse:  [59-63] 60  Resp:  [17] 17  BP: (119-129)/(66-73) 119/68  SpO2:  [95 %-96 %] 96 %    Physical Exam  Vitals and nursing note reviewed.   Constitutional:       General: She is not in acute distress.      Appearance: Normal appearance. She is not toxic-appearing.   HENT:      Head: Normocephalic and atraumatic.      Right Ear: External ear normal.      Left Ear: External ear normal.      Nose: No congestion.      Mouth/Throat:      Pharynx: No oropharyngeal exudate.   Eyes:      General: No scleral icterus.     Extraocular Movements: Extraocular movements intact.   Cardiovascular:      Rate and Rhythm: Normal rate and regular rhythm.      Heart sounds: Murmur heard.   Pulmonary:      Effort: Pulmonary effort is normal.      Breath sounds: No wheezing.   Abdominal:      General: Bowel sounds are normal.      Palpations: Abdomen is soft.      Tenderness: There is no abdominal tenderness. There is no guarding or rebound.   Musculoskeletal:         General: No swelling or deformity.      Cervical back: Neck supple.      Left knee: Tenderness present.      Comments: L knee wrapped, drain in place   Skin:     Coloration: Skin is not jaundiced.      Findings: No bruising.      Comments: Skin graft scars b/l LEs   Neurological:      Mental Status: She is alert and oriented to person, place, and time. Mental status is at baseline.      Cranial Nerves: No cranial nerve deficit.   Psychiatric:         Mood and Affect: Mood normal.         Behavior: Behavior normal.       Fluids    Intake/Output Summary (Last 24 hours) at 1/8/2023 1058  Last data filed at 1/7/2023 1928  Gross per 24 hour   Intake --   Output 800 ml   Net -800 ml       Laboratory  Recent Labs     01/06/23  0122 01/07/23 0147 01/08/23  0049   WBC 4.9 4.5* 4.9   RBC 2.60* 2.60* 2.70*   HEMOGLOBIN 7.4* 7.5* 7.8*   HEMATOCRIT 23.9* 23.5* 24.7*   MCV 91.9 90.4 91.5   MCH 28.5 28.8 28.9   MCHC 31.0* 31.9* 31.6*   RDW 55.3* 54.9* 55.9*   PLATELETCT 123* 107* 124*   MPV 10.3 10.0 10.8     Recent Labs     01/06/23  0122 01/07/23 0147 01/08/23  0049   SODIUM 133* 134* 133*   POTASSIUM 3.9 4.0 4.2   CHLORIDE 108 109 106   CO2 17* 19* 18*   GLUCOSE 139* 221* 140*   BUN 11  12 11   CREATININE 0.78 0.75 0.65   CALCIUM 7.9* 7.8* 7.9*                   Imaging  CT-CHEST,ABDOMEN,PELVIS WITH   Final Result      1.  No acute abnormality within the abdomen or pelvis      2.  Cirrhosis with portal hypertension      3.  Associated splenomegaly and ascites      4.  Peripheral pulmonary interstitial densities consistent with fibrosis      5.  Mediastinal and bilateral hilar calcification consistent with granuloma      BV-[id number] VIEW   Final Result      No evidence of bowel obstruction.      DX-CHEST-LIMITED (1 VIEW)   Final Result      1.  Hypoinflation without other evidence for acute cardiopulmonary disease.   2.  Evidence of old granulomatous disease.      DX-INJECT OR ASPIRATE W/O US GUIDANCE-MED JOINT LEFT   Final Result      Successful fluoroscopically-guided aspiration of the left glenohumeral joint with 5 mL bloody fluid sent to the lab.      MR-SHOULDER-W/O LEFT   Final Result      Diminished medium sized joint effusion communicates with the subacromial space and there are marginal erosions. This could indicate recurrent septic arthropathy and osteomyelitis but the appearance is improved from comparison and it could also just be    degenerative      Severe glenohumeral cartilage loss with moderate osteophyte formation and evidence of prior posterior glenoid fracture, chronic posterior humeral head subluxation without dislocation      Posterior and superior glenoid labrum tears, chronic      Complete supraspinatus, subscapularis tears with fatty atrophy      Nonvisualization of the long head biceps intra-articular segment indicating likely chronic tear and retraction      Considerably degraded by motion artifact      DX-KNEE 2- LEFT   Final Result      Antibiotic impregnated radiopaque beads within and about the knee arthroplasty.      DX-SHOULDER 2+ RIGHT   Final Result      1.  Moderately advanced acromioclavicular degenerative changes.   2.  Acromiohumeral narrowing suggesting  chronic rotator cuff tears.   3.  No acute fracture or dislocation.      DX-CHEST-PORTABLE (1 VIEW)   Final Result      1.  No acute cardiopulmonary abnormality.   2.  Prior granulomatous exposure.         DX-KNEE 3 VIEWS LEFT   Final Result      1.  Revised left knee arthroplasty is unchanged since prior. No evidence of hardware complication.   2.  No acute fracture or dislocation.   3.  Marked soft tissue swelling and suspected knee effusion.      IR-PICC LINE PLACEMENT W/ GUIDANCE > AGE 5    (Results Pending)          Assessment/Plan  * Septic joint (HCC)- (present on admission)  Assessment & Plan  Patient has a history of group B strep bacteremia secondary to infection of tibial component of left arthroplasty hardware  Status post antibiotic spacer placement by orthopedics (Dr. Luz)   --Comes in with left knee pain and swelling and L shoulder pain   --Arthrocentesis in the ER concerning for septic knee; Orthopedics consulted: I&D 12/28, cultures growing GBS  Infectious disease consulted: recommending Ancef x 6 weeks, end date 2/7/23, needs weekly labs CBC, CMP  MR Shoulder effusion status post arthrocentesis, fluid analysis didn't show any infection    IV rocephin thru 02/07  Await PICC  SNF placement    Fever  Assessment & Plan  Afebrile last 24 hours  CT chest, abdomen, pelvis with no acute process  ID following  On Rocephin    Metabolic acidosis  Assessment & Plan  Normal AG, likely in setting of multiple BMs from lactulose  Improving     Anemia of chronic disease  Assessment & Plan  S/p PRBC transfusion x 1.  Iron studies consistent with iron deficiency, continue IV iron   -- hemoglobin at 7.4  If less than 7, will transfuse    Acute cystitis without hematuria- (present on admission)  Assessment & Plan  Patient c/o pain with urination and lower abd discomfort  UA c/f infection  ucx growing e.coli resistant to PCN  S/p completion of treatment     Recheck UA today- continues to be febrile     Atrial  fibrillation (HCC)- (present on admission)  Assessment & Plan  Continue home dronedarone 400 mg twice daily  Holding eliquis d/t anemia requiring transfusions   -- Discussed adverse effect of holding Eliquis especially in the light of atrial fibrillation including stroke, patient states understanding    Cirrhosis (HCC)- (present on admission)  Assessment & Plan  In setting of alcohol use however no EtOH in over a year  Continue home lactulose    Thrombocytopenia (HCC)- (present on admission)  Assessment & Plan  Likely in setting of cirrhosis  Holding Eliquis  Monitor    Acute encephalopathy  Assessment & Plan  improving    Cirrhosis, alcoholic (HCC)- (present on admission)  Assessment & Plan  As confirmed by imaging    Hypomagnesemia  Assessment & Plan  Replete and monitor    Hepatic encephalopathy  Assessment & Plan  Resolved    GERD (gastroesophageal reflux disease)  Assessment & Plan  Continue home omeprazole      Type 2 diabetes mellitus (HCC)  Assessment & Plan  Diet controlled  a1c 5.4  Dc SSI  Regular diet per patient request     ELIZABETH (acute kidney injury) (HCC)  Assessment & Plan  Resolved  Avoid nephrotoxin, creatinine at 0.78, monitor    Hypertension- (present on admission)  Assessment & Plan  Home regimen: Losartan 100 mg daily, felodipine 5 mg q. Evening  Hold as patient Bp is noted to eb on the lower side         VTE prophylaxis: lovenox  I have performed a physical exam and reviewed and updated ROS and Plan today (1/8/2023). In review of yesterday's note (1/7/2023), there are no changes except as documented above.

## 2023-01-08 NOTE — PROGRESS NOTES
Brief ID note:    Chart reviewed.  Patient's fever curve improved.  Patient afebrile overnight with a T-max of 99.9.  WBC 4.9.  CT chest, abdomen and pelvis with contrast with no evidence of infection    Assessment:  April Alicia is a 62 y.o. female patient with history of alcohol and cocaine abuse, recently admitted with left knee prosthetic joint infection and left shoulder septic arthritis status post I&D of the left shoulder and removal of left knee hardware with placement of antibiotic spacer, completed 6 weeks of IV ceftriaxone at a facility on 8/30/2022.  She unfortunately was lost to follow-up and could not make several follow-up appointments, either with ID or orthopedics.  She is now readmitted with recurrent infection of the left knee spacer     Pertinent Diagnoses:  Fevers, started on 1/2, improving overall  - Patient still with shoulder pain and some mild discomfort in knee.  Denies any headache, sinus pain, mouth or tooth pain, cough or shortness of breath.  Denying any current abdominal pain or nausea.  No diarrhea.  She is still complaining of some dysuria.  No rash.    Left knee chronic prosthetic joint infection  Group B strep infection  UTI, E. coli  Recent left shoulder septic arthritis  History of alcohol and cocaine abuse     Plan:  -Patient was taken to the OR on 12/28, underwent explantation and placement of articulating spacer, wound VAC.  Multiple OR cultures again growing group B Strep including from tissue and femoral canal.  -Continue IV ceftriaxone 2 g daily  - CT chest, abdomen and pelvis with contrast on 1/7-no evidence of infection  --- Has been complaining of shoulder pain, MRI left shoulder without contrast shows diminished medium size joint effusion which communicates with the subacromial space and marginal erosions.  Underwent synovial fluid removal from the left shoulder on 1/2/2022 WBC 1051, 40% polys, 40% lymphs, not consistent with infection.  Cultures-no  growth.  --- Blood cultures on 1/3 of no growth to date  -PICC line ordered   -Stop date of antibiotics 2/7/2023     Disposition: Recommend completing 6 weeks of IV antibiotics through 2/7/2023 a skilled facility. At least weekly CBC with differential, CMP while on IV antibiotics.      Need for PICC line: Yes     Follow-up in ID clinic    Discussed with internal medicine, Dr. Martínez.  ID signing off.  Please reconsult if needed

## 2023-01-08 NOTE — CARE PLAN
The patient is Stable - Low risk of patient condition declining or worsening    Shift Goals  Clinical Goals: Pain control  Patient Goals: Pain control, comfort  Family Goals: EDMUND    Progress made toward(s) clinical / shift goals:      Problem: Knowledge Deficit - Standard  Goal: Patient and family/care givers will demonstrate understanding of plan of care, disease process/condition, diagnostic tests and medications  Description: Target End Date:  1-3 days or as soon as patient condition allows    Document in Patient Education    1.  Patient and family/caregiver oriented to unit, equipment, visitation policy and means for communicating concern  2.  Complete/review Learning Assessment  3.  Assess knowledge level of disease process/condition, treatment plan, diagnostic tests and medications  4.  Explain disease process/condition, treatment plan, diagnostic tests and medications  Outcome: Progressing     Problem: Pain - Standard  Goal: Alleviation of pain or a reduction in pain to the patient’s comfort goal  Description: Target End Date:  Prior to discharge or change in level of care    Document on Vitals flowsheet    1.  Document pain using the appropriate pain scale per order or unit policy  2.  Educate and implement non-pharmacologic comfort measures (i.e. relaxation, distraction, massage, cold/heat therapy, etc.)  3.  Pain management medications as ordered  4.  Reassess pain after pain med administration per policy  5.  If opiods administered assess patient's response to pain medication is appropriate per POSS sedation scale  6.  Follow pain management plan developed in collaboration with patient and interdisciplinary team (including palliative care or pain specialists if applicable)  Outcome: Progressing     Problem: Fall Risk  Goal: Patient will remain free from falls  Description: Target End Date:  Prior to discharge or change in level of care    Document interventions on the [facility] Fall Risk  Assessment    1.  Assess for fall risk factors  2.  Implement fall precautions  Outcome: Progressing     Problem: Skin Integrity  Goal: Skin integrity is maintained or improved  Description: Target End Date:  Prior to discharge or change in level of care    Document interventions on Skin Risk/[facility] flowsheet groups and corresponding LDA    1.  Assess and monitor skin integrity, appearance and/or temperature  2.  Assess risk factors for impaired skin integrity and/or pressures ulcers  3.  Implement precautions to protect skin integrity in collaboration with interdisciplinary team  4.  Implement pressure ulcer prevention protocol if at risk for skin breakdown  5.  Confirm wound care consult if at risk for skin breakdown  6.  Ensure patient use of pressure relieving devices  (Low air loss bed, waffle overlay, heel protectors, ROHO cushion, etc)  Outcome: Progressing     Problem: Communication  Goal: The ability to communicate needs accurately and effectively will improve  Description: Target End Date:  End of day 1    1.  Assess ability to communicate and understand  2.  Provide augmentative or alternative methods of communication devices  3.  Use /language line as appropriate  4.  Collaborate with Speech Therapy as needed  Outcome: Progressing     Problem: Bowel Elimination  Goal: Establish and maintain regular bowel function  Description: Target End Date:  Prior to discharge or change in level of care    1.   Note date of last BM  2.   Educate about diet, fluid intake, medication and activity to promote bowel function  3.   Educate signs and symptoms of constipation and interventions to implement  4.   Pharmacologic bowel management per provider order  5.   Regular toileting schedule  6.   Upright position for toileting  7.   High fiber diet  8.   Encourage hydration  9.   Collaborate with Clinical Dietician  10. Care and maintenance of ostomy if applicable  Outcome: Progressing

## 2023-01-09 ENCOUNTER — APPOINTMENT (OUTPATIENT)
Dept: RADIOLOGY | Facility: MEDICAL CENTER | Age: 63
DRG: 486 | End: 2023-01-09
Attending: INTERNAL MEDICINE
Payer: MEDICAID

## 2023-01-09 LAB
SARS-COV+SARS-COV-2 AG RESP QL IA.RAPID: NOTDETECTED
SPECIMEN SOURCE: NORMAL

## 2023-01-09 PROCEDURE — 87426 SARSCOV CORONAVIRUS AG IA: CPT

## 2023-01-09 PROCEDURE — A9270 NON-COVERED ITEM OR SERVICE: HCPCS | Performed by: NURSE PRACTITIONER

## 2023-01-09 PROCEDURE — A9270 NON-COVERED ITEM OR SERVICE: HCPCS | Performed by: HOSPITALIST

## 2023-01-09 PROCEDURE — 02HV33Z INSERTION OF INFUSION DEVICE INTO SUPERIOR VENA CAVA, PERCUTANEOUS APPROACH: ICD-10-PCS | Performed by: INTERNAL MEDICINE

## 2023-01-09 PROCEDURE — 97530 THERAPEUTIC ACTIVITIES: CPT

## 2023-01-09 PROCEDURE — 700102 HCHG RX REV CODE 250 W/ 637 OVERRIDE(OP): Performed by: NURSE PRACTITIONER

## 2023-01-09 PROCEDURE — 700111 HCHG RX REV CODE 636 W/ 250 OVERRIDE (IP): Performed by: HOSPITALIST

## 2023-01-09 PROCEDURE — A9270 NON-COVERED ITEM OR SERVICE: HCPCS | Performed by: STUDENT IN AN ORGANIZED HEALTH CARE EDUCATION/TRAINING PROGRAM

## 2023-01-09 PROCEDURE — 700111 HCHG RX REV CODE 636 W/ 250 OVERRIDE (IP): Performed by: INTERNAL MEDICINE

## 2023-01-09 PROCEDURE — 700102 HCHG RX REV CODE 250 W/ 637 OVERRIDE(OP): Performed by: HOSPITALIST

## 2023-01-09 PROCEDURE — A9270 NON-COVERED ITEM OR SERVICE: HCPCS | Performed by: INTERNAL MEDICINE

## 2023-01-09 PROCEDURE — 97535 SELF CARE MNGMENT TRAINING: CPT

## 2023-01-09 PROCEDURE — 99231 SBSQ HOSP IP/OBS SF/LOW 25: CPT | Performed by: INTERNAL MEDICINE

## 2023-01-09 PROCEDURE — 700102 HCHG RX REV CODE 250 W/ 637 OVERRIDE(OP): Performed by: INTERNAL MEDICINE

## 2023-01-09 PROCEDURE — 770001 HCHG ROOM/CARE - MED/SURG/GYN PRIV*

## 2023-01-09 PROCEDURE — 700102 HCHG RX REV CODE 250 W/ 637 OVERRIDE(OP): Performed by: STUDENT IN AN ORGANIZED HEALTH CARE EDUCATION/TRAINING PROGRAM

## 2023-01-09 PROCEDURE — C1751 CATH, INF, PER/CENT/MIDLINE: HCPCS

## 2023-01-09 RX ADMIN — OXYCODONE HYDROCHLORIDE 10 MG: 10 TABLET ORAL at 05:04

## 2023-01-09 RX ADMIN — OXYCODONE HYDROCHLORIDE 10 MG: 10 TABLET ORAL at 13:44

## 2023-01-09 RX ADMIN — CALCIUM CARBONATE 1000 MG: 500 TABLET, CHEWABLE ORAL at 09:35

## 2023-01-09 RX ADMIN — LACTULOSE 15 ML: 20 SOLUTION ORAL at 13:44

## 2023-01-09 RX ADMIN — DRONEDARONE 400 MG: 400 TABLET, FILM COATED ORAL at 07:44

## 2023-01-09 RX ADMIN — ENOXAPARIN SODIUM 40 MG: 40 INJECTION SUBCUTANEOUS at 17:51

## 2023-01-09 RX ADMIN — OXYCODONE HYDROCHLORIDE 10 MG: 10 TABLET ORAL at 01:00

## 2023-01-09 RX ADMIN — DOCUSATE SODIUM 50 MG AND SENNOSIDES 8.6 MG 2 TABLET: 8.6; 5 TABLET, FILM COATED ORAL at 17:50

## 2023-01-09 RX ADMIN — DRONEDARONE 400 MG: 400 TABLET, FILM COATED ORAL at 17:30

## 2023-01-09 RX ADMIN — GABAPENTIN 200 MG: 100 CAPSULE ORAL at 17:50

## 2023-01-09 RX ADMIN — CEFTRIAXONE SODIUM 2000 MG: 10 INJECTION, POWDER, FOR SOLUTION INTRAVENOUS at 05:11

## 2023-01-09 RX ADMIN — ATORVASTATIN CALCIUM 20 MG: 20 TABLET, FILM COATED ORAL at 21:48

## 2023-01-09 RX ADMIN — OMEPRAZOLE 20 MG: 20 CAPSULE, DELAYED RELEASE ORAL at 17:50

## 2023-01-09 RX ADMIN — SODIUM BICARBONATE 1300 MG: 650 TABLET ORAL at 17:50

## 2023-01-09 RX ADMIN — OXYCODONE HYDROCHLORIDE 10 MG: 10 TABLET ORAL at 17:50

## 2023-01-09 RX ADMIN — OMEPRAZOLE 20 MG: 20 CAPSULE, DELAYED RELEASE ORAL at 05:04

## 2023-01-09 RX ADMIN — SODIUM BICARBONATE 1300 MG: 650 TABLET ORAL at 05:04

## 2023-01-09 RX ADMIN — DOCUSATE SODIUM 50 MG AND SENNOSIDES 8.6 MG 2 TABLET: 8.6; 5 TABLET, FILM COATED ORAL at 05:04

## 2023-01-09 RX ADMIN — GABAPENTIN 200 MG: 100 CAPSULE ORAL at 05:04

## 2023-01-09 RX ADMIN — OXYCODONE HYDROCHLORIDE 10 MG: 10 TABLET ORAL at 21:48

## 2023-01-09 RX ADMIN — OXYCODONE HYDROCHLORIDE 10 MG: 10 TABLET ORAL at 09:32

## 2023-01-09 RX ADMIN — NICOTINE 14 MG: 14 PATCH TRANSDERMAL at 05:04

## 2023-01-09 RX ADMIN — Medication 220 MG: at 05:04

## 2023-01-09 ASSESSMENT — ENCOUNTER SYMPTOMS
FEVER: 0
COUGH: 0
VOMITING: 0
SHORTNESS OF BREATH: 0
PALPITATIONS: 0
HEADACHES: 0
HALLUCINATIONS: 0
DIARRHEA: 0
NAUSEA: 0
ABDOMINAL PAIN: 1
CHILLS: 0
DOUBLE VISION: 0
DIZZINESS: 0
BLURRED VISION: 0
WEAKNESS: 1
MYALGIAS: 1

## 2023-01-09 ASSESSMENT — COGNITIVE AND FUNCTIONAL STATUS - GENERAL
DAILY ACTIVITIY SCORE: 18
DRESSING REGULAR UPPER BODY CLOTHING: A LITTLE
TOILETING: A LITTLE
SUGGESTED CMS G CODE MODIFIER DAILY ACTIVITY: CK
HELP NEEDED FOR BATHING: A LOT
DRESSING REGULAR LOWER BODY CLOTHING: A LOT

## 2023-01-09 ASSESSMENT — PAIN DESCRIPTION - PAIN TYPE
TYPE: SURGICAL PAIN
TYPE: ACUTE PAIN;SURGICAL PAIN
TYPE: SURGICAL PAIN;ACUTE PAIN
TYPE: ACUTE PAIN;SURGICAL PAIN
TYPE: SURGICAL PAIN
TYPE: ACUTE PAIN;SURGICAL PAIN
TYPE: ACUTE PAIN;SURGICAL PAIN
TYPE: SURGICAL PAIN
TYPE: ACUTE PAIN;SURGICAL PAIN

## 2023-01-09 ASSESSMENT — LIFESTYLE VARIABLES: SUBSTANCE_ABUSE: 0

## 2023-01-09 ASSESSMENT — GAIT ASSESSMENTS: DISTANCE (FEET): 30

## 2023-01-09 NOTE — PROCEDURES
Vascular Access Team     Date of Insertion: 1/9/23  Arm Circumference: 32  Internal length: 44  External Length: 2  Vein Occupancy %: 34   Reason for PICC: Antibiotic Therapy  Labs: WBC 4.9, , BUN 0.65, Cr 99, GFR 99, INR NA     Consents confirmed, vessel patency confirmed with ultrasound. Risks and benefits of procedure explained to patient and education regarding central line associated bloodstream infections provided. Questions answered.      PICC placed in RUE per licensed provider order with ultrasound guidance.  4 Fr, single lumen PICC placed in basilic vein after 1 attempt(s). 2 mL of 1% lidocaine injected intradermally at the insertion site. A 21 gauge microintroducer needle was visualized entering the vein and modified Seldinger technique was used to obtain access to the vein. 44 cm catheter inserted and brisk blood return was observed from each lumen upon aspiration. Line secured at the 2 cm marker. TCS stylet removed and observed to be fully intact. Each lumen flushed using pulsatile method without resistance with 10 mL 0.9% normal saline. PICC line secured with Biopatch and Tegaderm.     PICC tip placement location is confirmed by nurse to be in the Superior Vena Cava (SVC) utilizing 3CG technology. PICC line is appropriate for use at this time. Patient tolerated procedure well, without complications.  Patient condition relayed to primary RN or ordering physician via this post procedure note in the EMR.      Ultrasound images uploaded to PACS and viewable in the EMR - yes  Ultrasound imaged printed and placed in paper chart - no     BARD Power PICC ref # [id number], Lot # [id number], Expiration Date 11/30/23

## 2023-01-09 NOTE — CARE PLAN
The patient is Stable - Low risk of patient condition declining or worsening    Shift Goals  Clinical Goals: Pain control, safety  Patient Goals: Pain control, comfort  Family Goals: Not present    Progress made toward(s) clinical / shift goals:  Patient educated on the pain scale and to notify RN if pain remains uncontrolled.  Patient verbalizes pain control using PRN oxycodone. Patient medicated per MAR.    Patient is not progressing towards the following goals:

## 2023-01-09 NOTE — DISCHARGE PLANNING
Agency/Facility Name: [facility]   Spoke To: Vick   Outcome: Left a voicemail for Vick in admissions. Informed her pt is getting a PICC placed this morning and then will be ready. Asked if bed will available and what time would work. Waiting for callback.     @1529  Agency/Facility Name: [facility]   Outcome: Left another voicemail with admissions in regards to bed availability possibly for tomorrow. Waiting for callback.     @1532  Agency/Facility Name: [facility]   Spoke To: Vick   Outcome: Requested transportation for around 1100 tomorrow. Pt has been there previously so [facility] not necessary. DPA will call to confirm transport time once it becomes available.

## 2023-01-09 NOTE — THERAPY
Occupational Therapy  Daily Treatment     Patient Name: April Alicia  Age:  62 y.o., Sex:  female  Medical Record #: [id number]  Today's Date: 1/9/2023     Precautions: Weight Bearing As Tolerated Left Lower Extremity, Fall Risk, Immobilizer Left Lower Extremity  Comments: [place]    Assessment    Pt agreeable to OT tx session. She continues to be limited by pain, weakness, and poor endurance. Pt demonstrated improved activity tolerance today and participation in LB dressing. Continue to recommend OOB activity daily with RN including mobility to bathroom with FWW and up in chair for all meals (pt is a 1 person assist). Will continue to follow.    Plan    Occupational Therapy Treatment Plan  O.T. Treatment Plan: (P) Continue Current Treatment Plan  Modified Plan: Change to Three Times per Week, Self Care / Activities of Daily Living, Adaptive Equipment, Cognitive Skill Development, Neuro Re-Education / Balance, Therapeutic Exercises, Therapeutic Activity    DC Equipment Recommendations: (P) Unable to determine at this time  Discharge Recommendations: (P) Recommend post-acute placement for additional occupational therapy services prior to discharge home       01/09/23 1215   Cognition    Comments agreeable with encouragement   Balance   Sitting Balance (Static) Fair   Sitting Balance (Dynamic) Fair   Standing Balance (Static) Fair   Standing Balance (Dynamic) Fair -   Weight Shift Sitting Fair   Weight Shift Standing Fair   Skilled Intervention Verbal Cuing   Comments with FWW   Bed Mobility    Supine to Sit Minimal Assist   Scooting Supervised   Skilled Intervention Verbal Cuing   Activities of Daily Living   Grooming Minimal Assist;Seated   Lower Body Dressing Maximal Assist   Toileting Moderate Assist   Skilled Intervention Verbal Cuing;Tactile Cuing;Compensatory Strategies   Comments assist with donning LLE immobilizer and BLE tennis shoes   How much help from another person does the patient currently need...    6 Clicks Daily Activity Score 18   Functional Mobility   Sit to Stand Minimal Assist   Bed, Chair, Wheelchair Transfer Minimal Assist   Mobility room and short hallway distances   Distance (Feet) 30   # of Times Distance was Traveled 1   Patient / Family Goals   Patient / Family Goal #1 to return home   Short Term Goals   Short Term Goal # 1 supervised with UB dressing   Goal Outcome # 1 Progressing as expected   Short Term Goal # 2 min A with LB dressing   Goal Outcome # 2 Progressing as expected   Short Term Goal # 3 min A with ADL txfs   Goal Outcome # 3 Goal met, new goal added   Short Term Goal # 3 B Pt will demo SPV for ADL txfs   Education Group   Education Provided Pathology of bedrest   Pathology of Bedrest Patient Response Patient;Acceptance;Explanation   Occupational Therapy Treatment Plan   O.T. Treatment Plan Continue Current Treatment Plan   Anticipated Discharge Equipment and Recommendations   DC Equipment Recommendations Unable to determine at this time   Discharge Recommendations Recommend post-acute placement for additional occupational therapy services prior to discharge home

## 2023-01-10 ENCOUNTER — PATIENT OUTREACH (OUTPATIENT)
Dept: SCHEDULING | Facility: IMAGING CENTER | Age: 63
End: 2023-01-10
Payer: MEDICAID

## 2023-01-10 VITALS
TEMPERATURE: 97.6 F | WEIGHT: 170 LBS | HEIGHT: 68 IN | DIASTOLIC BLOOD PRESSURE: 64 MMHG | RESPIRATION RATE: 16 BRPM | BODY MASS INDEX: 25.76 KG/M2 | HEART RATE: 60 BPM | OXYGEN SATURATION: 96 % | SYSTOLIC BLOOD PRESSURE: 117 MMHG

## 2023-01-10 PROCEDURE — 700102 HCHG RX REV CODE 250 W/ 637 OVERRIDE(OP): Performed by: NURSE PRACTITIONER

## 2023-01-10 PROCEDURE — 700102 HCHG RX REV CODE 250 W/ 637 OVERRIDE(OP): Performed by: INTERNAL MEDICINE

## 2023-01-10 PROCEDURE — 700111 HCHG RX REV CODE 636 W/ 250 OVERRIDE (IP): Performed by: INTERNAL MEDICINE

## 2023-01-10 PROCEDURE — A9270 NON-COVERED ITEM OR SERVICE: HCPCS | Performed by: INTERNAL MEDICINE

## 2023-01-10 PROCEDURE — A9270 NON-COVERED ITEM OR SERVICE: HCPCS | Performed by: STUDENT IN AN ORGANIZED HEALTH CARE EDUCATION/TRAINING PROGRAM

## 2023-01-10 PROCEDURE — 700102 HCHG RX REV CODE 250 W/ 637 OVERRIDE(OP): Performed by: HOSPITALIST

## 2023-01-10 PROCEDURE — A9270 NON-COVERED ITEM OR SERVICE: HCPCS | Performed by: NURSE PRACTITIONER

## 2023-01-10 PROCEDURE — 700101 HCHG RX REV CODE 250: Performed by: INTERNAL MEDICINE

## 2023-01-10 PROCEDURE — 700102 HCHG RX REV CODE 250 W/ 637 OVERRIDE(OP): Performed by: STUDENT IN AN ORGANIZED HEALTH CARE EDUCATION/TRAINING PROGRAM

## 2023-01-10 PROCEDURE — A9270 NON-COVERED ITEM OR SERVICE: HCPCS | Performed by: HOSPITALIST

## 2023-01-10 PROCEDURE — 99239 HOSP IP/OBS DSCHRG MGMT >30: CPT | Performed by: INTERNAL MEDICINE

## 2023-01-10 RX ORDER — LACTULOSE 20 G/30ML
15 SOLUTION ORAL DAILY
Qty: 450 ML | Status: SHIPPED
Start: 2023-01-10 | End: 2024-01-02

## 2023-01-10 RX ORDER — ZINC SULFATE 50(220)MG
220 CAPSULE ORAL DAILY
Qty: 30 CAPSULE | Refills: 3 | Status: SHIPPED | OUTPATIENT
Start: 2023-01-11 | End: 2024-01-02

## 2023-01-10 RX ORDER — SODIUM BICARBONATE 650 MG/1
1300 TABLET ORAL 2 TIMES DAILY
Qty: 120 TABLET | Refills: 3 | Status: SHIPPED | OUTPATIENT
Start: 2023-01-10 | End: 2024-01-02

## 2023-01-10 RX ORDER — ERGOCALCIFEROL 1.25 MG/1
50000 CAPSULE ORAL
Qty: 5 CAPSULE | Status: SHIPPED
Start: 2023-01-10 | End: 2023-04-18

## 2023-01-10 RX ORDER — OXYCODONE HYDROCHLORIDE 10 MG/1
10 TABLET ORAL EVERY 6 HOURS PRN
Qty: 8 TABLET | Refills: 0 | Status: SHIPPED | OUTPATIENT
Start: 2023-01-10 | End: 2023-01-12

## 2023-01-10 RX ORDER — OMEPRAZOLE 20 MG/1
20 CAPSULE, DELAYED RELEASE ORAL EVERY 12 HOURS
Qty: 30 CAPSULE | Status: SHIPPED
Start: 2023-01-10 | End: 2023-04-18

## 2023-01-10 RX ORDER — DRONEDARONE 400 MG/1
400 TABLET, FILM COATED ORAL 2 TIMES DAILY WITH MEALS
Qty: 60 TABLET | Status: ON HOLD
Start: 2023-01-10 | End: 2023-04-22

## 2023-01-10 RX ORDER — ATORVASTATIN CALCIUM 20 MG/1
20 TABLET, FILM COATED ORAL NIGHTLY
Qty: 90 TABLET | Refills: 3 | Status: SHIPPED | OUTPATIENT
Start: 2023-01-10

## 2023-01-10 RX ADMIN — GABAPENTIN 200 MG: 100 CAPSULE ORAL at 04:17

## 2023-01-10 RX ADMIN — OXYCODONE HYDROCHLORIDE 10 MG: 10 TABLET ORAL at 12:09

## 2023-01-10 RX ADMIN — OMEPRAZOLE 20 MG: 20 CAPSULE, DELAYED RELEASE ORAL at 04:17

## 2023-01-10 RX ADMIN — DRONEDARONE 400 MG: 400 TABLET, FILM COATED ORAL at 08:14

## 2023-01-10 RX ADMIN — CALCIUM CARBONATE 1000 MG: 500 TABLET, CHEWABLE ORAL at 01:06

## 2023-01-10 RX ADMIN — SODIUM BICARBONATE 1300 MG: 650 TABLET ORAL at 04:17

## 2023-01-10 RX ADMIN — NICOTINE 14 MG: 14 PATCH TRANSDERMAL at 04:17

## 2023-01-10 RX ADMIN — POLYETHYLENE GLYCOL 3350 1 PACKET: 17 POWDER, FOR SOLUTION ORAL at 04:17

## 2023-01-10 RX ADMIN — Medication 220 MG: at 04:16

## 2023-01-10 RX ADMIN — DOCUSATE SODIUM 50 MG AND SENNOSIDES 8.6 MG 2 TABLET: 8.6; 5 TABLET, FILM COATED ORAL at 04:17

## 2023-01-10 RX ADMIN — CEFTRIAXONE SODIUM 2000 MG: 10 INJECTION, POWDER, FOR SOLUTION INTRAVENOUS at 04:18

## 2023-01-10 RX ADMIN — OXYCODONE HYDROCHLORIDE 10 MG: 10 TABLET ORAL at 04:16

## 2023-01-10 RX ADMIN — ACETAMINOPHEN 650 MG: 325 TABLET ORAL at 01:06

## 2023-01-10 RX ADMIN — ERGOCALCIFEROL 50000 UNITS: 1.25 CAPSULE ORAL at 04:17

## 2023-01-10 RX ADMIN — OXYCODONE HYDROCHLORIDE 10 MG: 10 TABLET ORAL at 08:14

## 2023-01-10 ASSESSMENT — PAIN DESCRIPTION - PAIN TYPE
TYPE: ACUTE PAIN
TYPE: ACUTE PAIN;SURGICAL PAIN
TYPE: ACUTE PAIN;SURGICAL PAIN

## 2023-01-10 NOTE — DISCHARGE PLANNING
Transport Request Received: 1/10/2023 at 1550    Transport Company: [facility]    Transport Date: 1/10/2023  Time: 3352-4714    MTM: Spoke with Maris - Trip # is [id number]    Destination:[facility] at [address] [facility] [address]    Notified care team of scheduled transport via [employer].      If there are any changes needed to the DC transportation scheduled, please contact [facility] at ext. [phone] between the hours of 3870-4209 Mon-Fri. If outside those hours, contact the ED Case Manager at ext. [phone].

## 2023-01-10 NOTE — DISCHARGE PLANNING
Agency/Facility Name: [facility]   Spoke To: Vick   Outcome: Informed Vick that transport is set for [id number] today.

## 2023-01-10 NOTE — DISCHARGE SUMMARY
Discharge Summary    CHIEF COMPLAINT ON ADMISSION  Chief Complaint   Patient presents with    Knee Pain     Pt states she woke up this morning with severe pain to L knee. Pt states this is ongoing ailment.       Reason for Admission  Knee Pain     Admission Date  12/26/2022    CODE STATUS  DNAR/DNI    HPI & HOSPITAL COURSE  This is a 62-year-old female with a past medical history significant for cocaine abuse, ethanol abuse complicated by liver cirrhosis, asthma, atrial fibrillation, second hypercoagulable state on Eliquis, diabetes mellitus, hypertension presented to ER on 12/ planes of increased pain in her left knee associated with fever, chills.    She had hx of bilateral knee surgery In 7/22, was found to have group B streptococcal bacteremia secondary to infection of tibial component of LEFT arthroplasty hardware; she underwent LEFT knee revision, total arthroplasty with antibiotic spacer placed. Plan was then to continue with CTX daily through 8/30/22. Followed with Dr. Gray (of [facility]).    In ER, underwent arthrocentesis, WBC 21,150; culture growing group B strep orthopedic and ID were consulted. Patient was started on broad spectrum antibiotics. Ortho performed left total knee arthroplasty explantation, left knee insertion of articulating spacer, left knee application of incisional wound vacuum. PICC was placed. Patient is to be on IV antibiotics until 02/07. Wound vac in place. Case management assisted with skilled nursing facility placement. Patient was determined satisfactory for discharge with appropriate follow up.    Therefore, she is discharged in fair and stable condition to skilled nursing facility.    The patient met 2-midnight criteria for an inpatient stay at the time of discharge.    Discharge Date  01/10/2023    FOLLOW UP ITEMS POST DISCHARGE  Please follow up with PCP in 3-5 days for post hospitalization follow up and medication reconciliation.       DISCHARGE  DIAGNOSES  Principal Problem:    Septic joint (HCC) POA: Yes  Active Problems:    Thrombocytopenia (HCC) POA: Yes    Cirrhosis (HCC) (Chronic) POA: Yes    Atrial fibrillation (HCC) POA: Yes    Acute cystitis without hematuria POA: Yes    Anemia of chronic disease POA: Unknown    Metabolic acidosis POA: Unknown    Fever POA: Unknown    Cirrhosis, alcoholic (HCC) POA: Yes    Acute encephalopathy POA: Unknown    Abnormal finding of lung POA: Unknown    Hypertension (Chronic) POA: Yes    ELIZABETH (acute kidney injury) (HCC) POA: Unknown    Type 2 diabetes mellitus (HCC) POA: Unknown    GERD (gastroesophageal reflux disease) POA: Unknown    Hepatic encephalopathy POA: Unknown    Hypomagnesemia POA: Unknown  Resolved Problems:    Asymptomatic bacteriuria POA: Unknown      FOLLOW UP  Future Appointments   Date Time Provider Department Center   1/23/2023 12:45 PM [facility] None     No follow-up provider specified.    MEDICATIONS ON DISCHARGE     Medication List        START taking these medications        Instructions   Calcium Carbonate Antacid 1000 MG Chew   Chew 1 Tablet 3 times a day as needed (heartburn).  Dose: 1,000 mg     cefTRIAXone 2 g/20 mL   Start taking on: January 11, 2023  Commonly known as: Rocephin   Infuse 20 mL into a venous catheter every 24 hours for 27 days.  Dose: 2,000 mg     oxyCODONE immediate release 10 MG immediate release tablet  Commonly known as: ROXICODONE   Take 1 Tablet by mouth every 6 hours as needed for Moderate Pain or Severe Pain for up to 2 days.  Dose: 10 mg     sodium bicarbonate 650 MG Tabs  Commonly known as: SODIUM BICARBONATE   Take 2 Tablets by mouth 2 times a day.  Dose: 1,300 mg     zinc sulfate 220 (50 Zn) MG Caps  Start taking on: January 11, 2023  Commonly known as: ZINCATE   Take 1 Capsule by mouth every day.  Dose: 220 mg            CONTINUE taking these medications        Instructions   albuterol 108 (90 Base) MCG/ACT Aers inhalation aerosol   Inhale 2  Puffs every four hours as needed for Shortness of Breath.  Dose: 2 Puff     atorvastatin 20 MG Tabs  Commonly known as: LIPITOR   Take 1 Tablet by mouth every evening.  Dose: 20 mg     gabapentin 400 MG Caps  Commonly known as: NEURONTIN   Take 400 mg by mouth 2 times a day.  Dose: 400 mg     lactulose 20 GM/30ML Soln   Take 15 mL by mouth every day.  Dose: 15 mL     Multaq 400 MG Tabs  Generic drug: dronedarone   Take 1 Tablet by mouth 2 times a day with meals.  Dose: 400 mg     omeprazole 20 MG delayed-release capsule  Commonly known as: PRILOSEC   Take 1 Capsule by mouth every 12 hours.  Dose: 20 mg     Spiriva HandiHaler 18 MCG Caps  Generic drug: tiotropium   Place 1 Capsule into inhaler and inhale every day.  Dose: 1 Capsule     vitamin D2 (Ergocalciferol) 1.25 MG (52353 UT) Caps capsule  Commonly known as: Drisdol   Take 1 Capsule by mouth every 7 days.  Dose: 50,000 Units            STOP taking these medications      ascorbic acid 500 MG tablet  Commonly known as: Vitamin C     cetirizine 10 MG Tabs  Commonly known as: ZYRTEC     felodipine ER 5 MG Tb24  Commonly known as: Plendil     ferrous sulfate 325 (65 Fe) MG tablet     hydrocortisone 1 % Oint     lidocaine 5 % Ptch  Commonly known as: LIDODERM     losartan 100 MG Tabs  Commonly known as: COZAAR     magnesium oxide 400 MG Tabs tablet  Commonly known as: MAG-OX     Minerin Creme Crea     Oyster Shell Calcium w/D 500-5 MG-MCG Tabs     pantoprazole 20 MG tablet  Commonly known as: PROTONIX     prenatal vitamin with Fe/FA 27-0.8 MG Tabs     Reguloid 57.6 % Powd  Generic drug: Psyllium     Vitamin D3 50 MCG (2000 UT) Tabs              Allergies  Allergies   Allergen Reactions    Nkda [No Known Drug Allergy]        DIET  Orders Placed This Encounter   Procedures    Diet Order Diet: Regular     Standing Status:   Standing     Number of Occurrences:   1     Order Specific Question:   Diet:     Answer:   Regular [1]       ACTIVITY  As tolerated.  Weight bearing  as tolerated    CONSULTATIONS  ID    PROCEDURES  1.  left total knee arthroplasty explantation  2. left knee insertion of articulating spacer   3. left knee application of incisional wound vacuum    LABORATORY  Lab Results   Component Value Date    SODIUM 133 (L) 01/08/2023    POTASSIUM 4.2 01/08/2023    CHLORIDE 106 01/08/2023    CO2 18 (L) 01/08/2023    GLUCOSE 140 (H) 01/08/2023    BUN 11 01/08/2023    CREATININE 0.65 01/08/2023    CREATININE 0.9 03/12/2009        Lab Results   Component Value Date    WBC 4.9 01/08/2023    HEMOGLOBIN 7.8 (L) 01/08/2023    HEMATOCRIT 24.7 (L) 01/08/2023    PLATELETCT 124 (L) 01/08/2023        Total time of the discharge process exceeds 49 minutes.

## 2023-01-10 NOTE — DISCHARGE INSTRUCTIONS
Total Knee Replacement, Care After  This sheet gives you information about how to care for yourself after your procedure. Your health care provider may also give you more specific instructions. If you have problems or questions, contact your health care provider.  What can I expect after the procedure?  After the procedure, it is common to have:  Pain.  Swelling.  A small amount of blood or clear fluid coming from your incision.  Limited range of motion.  Follow these instructions at home:  Medicines  Take over-the-counter and prescription medicines only as told by your health care provider.  If you were prescribed a blood thinner (anticoagulant), take it as told by your health care provider.  Ask your health care provider if the medicine prescribed to you:  Requires you to avoid driving or using heavy machinery.  Can cause constipation. You may need to take actions to prevent or treat constipation, such as:  Drink enough fluid to keep your urine pale yellow.  Take over-the-counter or prescription medicines.  Eat foods that are high in fiber, such as beans, whole grains, and fresh fruits and vegetables.  Limit foods that are high in fat and processed sugars, such as fried or sweet foods.  Bathing  Do not take baths, swim, or use a hot tub until your health care provider approves. Ask your health care provider if you may take showers. You may only be allowed to take sponge baths.  Keep your bandage (dressing) dry until your health care provider says it can be removed.  Incision care and drain care    Follow instructions from your health care provider about how to take care of your incision. Make sure you:  Wash your hands with soap and water before and after you change your dressing. If soap and water are not available, use hand .  Change your dressing as told by your health care provider.  Leave stitches (sutures), skin glue, or adhesive strips in place. These skin closures may need to stay in place for 2  weeks or longer. If adhesive strip edges start to loosen and curl up, you may trim the loose edges. Do not remove adhesive strips completely unless your health care provider tells you to do that.  Check your incision area and drain site every day for signs of infection. Check for:  More redness, swelling, or pain.  More fluid or blood.  Warmth.  Pus or a bad smell.  If you have a drain, follow instructions from your health care provider about caring for it.  Managing pain, stiffness, and swelling         If directed, put ice on your knee.  Put ice in a plastic bag or use the icing device (cold flow pad or cryocuff) that you were given. Follow instructions from your health care provider about how to use the icing device.  Place a towel between your skin and the bag or between your skin and the icing device.  Leave the ice on for 20 minutes, 2-3 times per day.  If directed, apply heat to the affected area before you exercise. Use the heat source that your health care provider recommends, such as a moist heat pack or a heating pad.  Place a towel between your skin and the heat source.  Leave the heat on for 20-30 minutes.  Remove the heat if your skin turns bright red. This is especially important if you are unable to feel pain, heat, or cold. You may have a greater risk of getting burned.  Move your toes often to avoid stiffness and to lessen swelling.  Raise (elevate) your leg above the level of your heart while you are sitting or lying down.  Use several pillows to keep your leg straight.  Do not put a pillow just under the knee. If the knee is bent for a long time, this may lead to stiffness.  Wear elastic knee support as told by your health care provider.  Activity  Rest as told by your health care provider.  Avoid sitting for a long time without moving. Get up to take short walks every 1-2 hours. This is important to improve blood flow and breathing. Ask for help if you feel weak or unsteady.  Ask your health  care provider what activities are safe for you.  Avoid high-impact activities, including running, jumping rope, and jumping jacks.  Do not play contact sports until your health care provider approves.  Do exercises as told by your physical therapist.  If you have been sent home with a continuous passive motion machine, use it as told by your health care provider.  Safety    Do not use your leg to support your body weight until your health care provider approves. Use crutches or a walker as told by your health care provider.  Do not drive until your health care provider approves. Ask your health care provider when it is safe to drive.  General instructions  Do not use any products that contain nicotine or tobacco, such as cigarettes, e-cigarettes, and chewing tobacco. These can delay healing after surgery. If you need help quitting, ask your health care provider.  Wear compression stockings as told by your health care provider.  Tell your health care provider if you plan to have dental work. Also, tell your dentist about your joint replacement.  Keep all follow-up visits as told by your health care provider. This is important.  Contact a health care provider if you have:  More redness, swelling, or pain around your incision or drain.  More fluid or blood coming from your incision or drain.  Pus or a bad smell coming from your incision or drain.  Warmth on your incision or drain site.  A fever.  An incision that breaks open.  Knee pain that does not go away.  Range of motion in your knee that is getting worse.  A prosthesis that feels loose.  Get help right away if you have:  Pain or swelling in your calf or thigh.  Shortness of breath or difficulty breathing.  Chest pain.  Summary  After the procedure, it is common to have pain and swelling, blood or fluid coming from your incision, and limited range of motion.  Follow instructions from your health care provider about how to take care of your incision.  Use crutches  "or a walker as told by your health care provider.  If you were prescribed a blood thinner (anticoagulant), take it as told by your health care provider.  Keep all follow-up visits as told by your health care provider. This is important.  This information is not intended to replace advice given to you by your health care provider. Make sure you discuss any questions you have with your health care provider.  Document Released: 07/07/2006 Document Revised: 01/10/2020 Document Reviewed: 08/01/2019  [employer] Interactive Patient Education © 2020 [employer]    PICC Line Instructions    How to Care for your PICC  Do not take a bath, swim, or use hot tubs when you have a PICC. Cover PICC line with clear plastic wrap and tape to keep it dry while showering  Check the PICC insertion site daily for leakage, redness, swelling, or pain.  Flush the PICC as directed by your health care provider. Let your health care provider know right away if the PICC is difficult to flush or does not flush. Do not use force to flush the PICC.  Do not use a syringe that is less than 10 mL to flush the PICC  Avoid blood pressure checks on the arm with the PICC.  Do not take the PICC out yourself. Only a trained clinical professional should remove the PICC  Make sure you or anyone who access your PICC Line washes their hands before using the line  Make sure the hub of the line is \"scrubbed\" prior to using the line  Dressing Changes  Change the PICC dressing as instructed by your health care provider.  Change your PICC dressing if it becomes loose or wet.  When to seek medical attention  PICC is accidentally pulled all the way out  There is any type of drainage, redness, or swelling where the PICC enters the skin.  You cannot flush the PICC, it is difficult to flush, or the PICC leaks around the insertion site when it is flushed.  You hear a \"flushing\" sound when the PICC is flushed.  You notice a hole or tear in the PICC.  You develop chills or a " fever

## 2023-01-10 NOTE — CARE PLAN
Problem: Knowledge Deficit - Standard  Goal: Patient and family/care givers will demonstrate understanding of plan of care, disease process/condition, diagnostic tests and medications  Outcome: Progressing   Pt educated on discharge tomorrow. Pt educated on PICC line care.   Problem: Pain - Standard  Goal: Alleviation of pain or a reduction in pain to the patient’s comfort goal  Outcome: Progressing     Problem: Fall Risk  Goal: Patient will remain free from falls  Outcome: Progressing  Fall precautions in place. Pt educated to call for assistance as needed.   The patient is Stable - Low risk of patient condition declining or worsening    Shift Goals  Clinical Goals: Pain control, safety  Patient Goals: Pain control, comfort  Family Goals: Not present    Progress made toward(s) clinical / shift goals:  Pt verbalizing understanding of discharge plan. All questions answered.    Patient is not progressing towards the following goals:

## 2023-01-10 NOTE — PROGRESS NOTES
Hospital Medicine Daily Progress Note    Date of Service  1/9/2023    Chief Complaint  April Alicia is a 62 y.o. female admitted 12/26/2022 with left knee pain    Hospital Course  This is a 62-year-old female with a past medical history significant for cocaine abuse, ethanol abuse complicated by liver cirrhosis, asthma, atrial fibrillation, second hypercoagulable state was on Eliquis, diabetes mellitus, hypertension presented to ER on 12/ planes of increased pain in her left knee associated with fever, chills.    She had hx of bilateral knee surgery In 7/22, was found to have group B streptococcal bacteremia secondary to infection of tibial component of LEFT arthroplasty hardware; she underwent LEFT knee revision, total arthroplasty with antibiotic spacer placed. Plan was then to continue with CTX daily through 8/30/22. Followed with Dr. Gray (of [facility]).    In ER, underwent arthrocentesis, WBC 21,150; culture growing group B strep orthopedic and ID were consulted, underwent surgery with Dr. Mcelroy on 12/20/2022 surgical culture growing group B strep.    She was started on broad-spectrum antibiotic including vancomycin and Unasyn later switched to Rocephin for 3 days for urinary tract infection.  ID was consulted, recommended ancef 7/2023.  While patient remains on IV antibiotics, need CBC, CMP, ESR, CRP.    Hospital course admitted with acute renal failure, which has been improving.  She received PRBC transfusion during the stay in the hospital.    Patient continued to complain of the pain in her left shoulder, MR showing Diminished medium sized joint effusion communicates with the subacromial space and there are marginal erosions. This could indicate recurrent septic arthropathy and osteomyelitis.  Ultrasound noted to have WBC 1051, RBC [id number].    Interval events:  Patient was seen and examined at bedside.  No acute events overnight. Patient is resting comfortably in bed  and in no acute distress.     S/p left total knee explant with spacer and wound vac 12/28  Tmax 101.1 in last 24 hours  ID recs   - currently on Rocephin - continue thru 02/07  Hb stable   - Considering patient being anemic and requiring blood transfusion, she would not discharge on Eliquis, she understand the risk of not being on Eliquis including stroke  PICC placed  SNF in AM    I have discussed this patient's plan of care and discharge plan at IDT rounds today with Case Management, Nursing, Nursing leadership, and other members of the IDT team.    Consultants/Specialty  infectious disease and orthopedics    Code Status  DNAR/DNI    Disposition  Patient is not medically cleared for discharge.   Anticipate discharge to to an inpatient rehabilitation hospital versus SNF  I have placed the appropriate orders for post-discharge needs.    Review of Systems  Review of Systems   Constitutional:  Positive for malaise/fatigue. Negative for chills and fever.   HENT:  Negative for congestion and hearing loss.    Eyes:  Negative for blurred vision and double vision.   Respiratory:  Negative for cough and shortness of breath.    Cardiovascular:  Negative for chest pain and palpitations.   Gastrointestinal:  Positive for abdominal pain. Negative for diarrhea, nausea and vomiting.   Genitourinary:  Negative for dysuria and frequency.   Musculoskeletal:  Positive for joint pain and myalgias.   Skin:  Negative for rash.   Neurological:  Positive for weakness. Negative for dizziness and headaches.   Psychiatric/Behavioral:  Negative for hallucinations and substance abuse.    All other systems reviewed and are negative.     Physical Exam  Temp:  [37.3 °C (99.1 °F)-37.9 °C (100.2 °F)] 37.4 °C (99.3 °F)  Pulse:  [60-62] 61  Resp:  [15-17] 17  BP: (111-117)/(58-63) 117/61  SpO2:  [93 %-97 %] 95 %    Physical Exam  Vitals and nursing note reviewed.   Constitutional:       General: She is not in acute distress.     Appearance: Normal  appearance. She is not toxic-appearing.   HENT:      Head: Normocephalic and atraumatic.      Right Ear: External ear normal.      Left Ear: External ear normal.      Nose: No congestion.      Mouth/Throat:      Pharynx: No oropharyngeal exudate.   Eyes:      General: No scleral icterus.     Extraocular Movements: Extraocular movements intact.   Cardiovascular:      Rate and Rhythm: Normal rate and regular rhythm.      Heart sounds: Murmur heard.   Pulmonary:      Effort: Pulmonary effort is normal.      Breath sounds: No wheezing.   Abdominal:      General: Bowel sounds are normal.      Palpations: Abdomen is soft.      Tenderness: There is no abdominal tenderness. There is no guarding or rebound.   Musculoskeletal:         General: No swelling or deformity.      Cervical back: Neck supple.      Left knee: Tenderness present.      Comments: L knee wrapped, drain in place   Skin:     Coloration: Skin is not jaundiced.      Findings: No bruising.      Comments: Skin graft scars b/l LEs   Neurological:      Mental Status: She is alert and oriented to person, place, and time. Mental status is at baseline.      Cranial Nerves: No cranial nerve deficit.   Psychiatric:         Mood and Affect: Mood normal.         Behavior: Behavior normal.   Patient was seen and examined at bedside. No changes in physical exam from prior evaluation.    Fluids    Intake/Output Summary (Last 24 hours) at 1/9/2023 1626  Last data filed at 1/8/2023 1918  Gross per 24 hour   Intake --   Output 900 ml   Net -900 ml       Laboratory  Recent Labs     01/07/23  0147 01/08/23  0049   WBC 4.5* 4.9   RBC 2.60* 2.70*   HEMOGLOBIN 7.5* 7.8*   HEMATOCRIT 23.5* 24.7*   MCV 90.4 91.5   MCH 28.8 28.9   MCHC 31.9* 31.6*   RDW 54.9* 55.9*   PLATELETCT 107* 124*   MPV 10.0 10.8     Recent Labs     01/07/23  0147 01/08/23  0049   SODIUM 134* 133*   POTASSIUM 4.0 4.2   CHLORIDE 109 106   CO2 19* 18*   GLUCOSE 221* 140*   BUN 12 11   CREATININE 0.75 0.65    CALCIUM 7.8* 7.9*                   Imaging  IR-PICC LINE PLACEMENT W/ GUIDANCE > AGE 5   Final Result                  Ultrasound-guided PICC placement performed by qualified nursing staff as    above.          CT-CHEST,ABDOMEN,PELVIS WITH   Final Result      1.  No acute abnormality within the abdomen or pelvis      2.  Cirrhosis with portal hypertension      3.  Associated splenomegaly and ascites      4.  Peripheral pulmonary interstitial densities consistent with fibrosis      5.  Mediastinal and bilateral hilar calcification consistent with granuloma      [id number] VIEW   Final Result      No evidence of bowel obstruction.      DX-CHEST-LIMITED (1 VIEW)   Final Result      1.  Hypoinflation without other evidence for acute cardiopulmonary disease.   2.  Evidence of old granulomatous disease.      DX-INJECT OR ASPIRATE W/O US GUIDANCE-MED JOINT LEFT   Final Result      Successful fluoroscopically-guided aspiration of the left glenohumeral joint with 5 mL bloody fluid sent to the lab.      MR-SHOULDER-W/O LEFT   Final Result      Diminished medium sized joint effusion communicates with the subacromial space and there are marginal erosions. This could indicate recurrent septic arthropathy and osteomyelitis but the appearance is improved from comparison and it could also just be    degenerative      Severe glenohumeral cartilage loss with moderate osteophyte formation and evidence of prior posterior glenoid fracture, chronic posterior humeral head subluxation without dislocation      Posterior and superior glenoid labrum tears, chronic      Complete supraspinatus, subscapularis tears with fatty atrophy      Nonvisualization of the long head biceps intra-articular segment indicating likely chronic tear and retraction      Considerably degraded by motion artifact      DX-KNEE 2- LEFT   Final Result      Antibiotic impregnated radiopaque beads within and about the knee arthroplasty.      DX-SHOULDER 2+ RIGHT    Final Result      1.  Moderately advanced acromioclavicular degenerative changes.   2.  Acromiohumeral narrowing suggesting chronic rotator cuff tears.   3.  No acute fracture or dislocation.      DX-CHEST-PORTABLE (1 VIEW)   Final Result      1.  No acute cardiopulmonary abnormality.   2.  Prior granulomatous exposure.         DX-KNEE 3 VIEWS LEFT   Final Result      1.  Revised left knee arthroplasty is unchanged since prior. No evidence of hardware complication.   2.  No acute fracture or dislocation.   3.  Marked soft tissue swelling and suspected knee effusion.             Assessment/Plan  * Septic joint (HCC)- (present on admission)  Assessment & Plan  Patient has a history of group B strep bacteremia secondary to infection of tibial component of left arthroplasty hardware  Status post antibiotic spacer placement by orthopedics (Dr. Luz)   --Comes in with left knee pain and swelling and L shoulder pain   --Arthrocentesis in the ER concerning for septic knee; Orthopedics consulted: I&D 12/28, cultures growing GBS  Infectious disease consulted: recommending Ancef x 6 weeks, end date 2/7/23, needs weekly labs CBC, CMP  MR Shoulder effusion status post arthrocentesis, fluid analysis didn't show any infection    IV rocephin thru 02/07  PICC placed  SNF placement    Fever  Assessment & Plan  Afebrile last 24 hours  CT chest, abdomen, pelvis with no acute process  ID following  On Rocephin    Metabolic acidosis  Assessment & Plan  Normal AG, likely in setting of multiple BMs from lactulose  Improving     Anemia of chronic disease  Assessment & Plan  S/p PRBC transfusion x 1.  Iron studies consistent with iron deficiency, continue IV iron   -- hemoglobin at 7.4  If less than 7, will transfuse    Acute cystitis without hematuria- (present on admission)  Assessment & Plan  Patient c/o pain with urination and lower abd discomfort  UA c/f infection  ucx growing e.coli resistant to PCN  S/p completion of treatment      Recheck UA today- continues to be febrile     Atrial fibrillation (HCC)- (present on admission)  Assessment & Plan  Continue home dronedarone 400 mg twice daily  Holding eliquis d/t anemia requiring transfusions   -- Discussed adverse effect of holding Eliquis especially in the light of atrial fibrillation including stroke, patient states understanding    Cirrhosis (HCC)- (present on admission)  Assessment & Plan  In setting of alcohol use however no EtOH in over a year  Continue home lactulose    Thrombocytopenia (HCC)- (present on admission)  Assessment & Plan  Likely in setting of cirrhosis  Holding Eliquis  Monitor    Acute encephalopathy  Assessment & Plan  improving    Cirrhosis, alcoholic (HCC)- (present on admission)  Assessment & Plan  As confirmed by imaging    Hypomagnesemia  Assessment & Plan  Replete and monitor    Hepatic encephalopathy  Assessment & Plan  Resolved    GERD (gastroesophageal reflux disease)  Assessment & Plan  Continue home omeprazole      Type 2 diabetes mellitus (HCC)  Assessment & Plan  Diet controlled  a1c 5.4  Dc SSI  Regular diet per patient request     ELIZABETH (acute kidney injury) (HCC)  Assessment & Plan  Resolved  Avoid nephrotoxin, creatinine at 0.78, monitor    Hypertension- (present on admission)  Assessment & Plan  Home regimen: Losartan 100 mg daily, felodipine 5 mg q. Evening  Hold as patient Bp is noted to eb on the lower side         VTE prophylaxis: lovenox  I have performed a physical exam and reviewed and updated ROS and Plan today (1/9/2023). In review of yesterday's note (1/8/2023), there are no changes except as documented above.

## 2023-01-10 NOTE — PROGRESS NOTES
Pt discharged to [facility] with medical transportation. AVS printed and reviewed with patient. All questions answered. Pt medicated per MAR prior to discharge. Narcotic script sent with [facility] [employer] paperwork. Report called to Kamila at [facility]. All questions answered. All personal belongings taken by patient.

## 2023-01-10 NOTE — DISCHARGE PLANNING
Discharge location has been changed to [facility] at [address] [place] [address] Johnny ROSALES- Spoke with Catherine at [facility].   SLC

## 2023-01-19 ENCOUNTER — TELEPHONE (OUTPATIENT)
Dept: PHYSICAL THERAPY | Facility: REHABILITATION | Age: 63
End: 2023-01-19
Payer: MEDICAID

## 2023-01-20 ENCOUNTER — HOSPITAL ENCOUNTER (OUTPATIENT)
Facility: MEDICAL CENTER | Age: 63
End: 2023-01-20
Attending: INTERNAL MEDICINE
Payer: MEDICAID

## 2023-01-20 LAB
CRP SERPL HS-MCNC: 4.55 MG/DL (ref 0–0.75)
ERYTHROCYTE [SEDIMENTATION RATE] IN BLOOD BY WESTERGREN METHOD: >140 MM/HOUR (ref 0–25)

## 2023-01-20 PROCEDURE — 85652 RBC SED RATE AUTOMATED: CPT

## 2023-01-20 PROCEDURE — 86140 C-REACTIVE PROTEIN: CPT

## 2023-01-20 NOTE — OP THERAPY DISCHARGE SUMMARY
Outpatient Physical Therapy  DISCHARGE SUMMARY NOTE      [facility] [place] [id number]  [address]  [facility] [address]  Phone:  [phone]  Fax:  [phone]    Date of Visit: 01/19/2023    Patient: April Alicia  YOB: 1960  MRN: [id number]     Referring Provider: Karen Mcclure P.A.-C.  [address] [place] [address]   Referring Diagnosis Pain in left knee [M25.562]         Functional Assessment Used        Your patient is being discharged from Physical Therapy with the following comments:   Change in status    Comments:  Pt has attended 3 visits from 10/20/22 to 12/19/22. Pt was admitted to hospital, will require new referral and clearance for new PT episode of care. Unable to assess goals.      Jeramie Garduno, PT, DPT    Date: 1/19/2023

## 2023-01-23 ENCOUNTER — OFFICE VISIT (OUTPATIENT)
Dept: CARDIOLOGY | Facility: MEDICAL CENTER | Age: 63
End: 2023-01-23
Payer: MEDICAID

## 2023-01-23 ENCOUNTER — NON-PROVIDER VISIT (OUTPATIENT)
Dept: CARDIOLOGY | Facility: MEDICAL CENTER | Age: 63
End: 2023-01-23
Attending: NURSE PRACTITIONER
Payer: MEDICAID

## 2023-01-23 VITALS
HEART RATE: 60 BPM | OXYGEN SATURATION: 98 % | WEIGHT: 168 LBS | SYSTOLIC BLOOD PRESSURE: 128 MMHG | BODY MASS INDEX: 25.46 KG/M2 | DIASTOLIC BLOOD PRESSURE: 78 MMHG | RESPIRATION RATE: 12 BRPM | HEIGHT: 68 IN

## 2023-01-23 DIAGNOSIS — I10 PRIMARY HYPERTENSION: Chronic | ICD-10-CM

## 2023-01-23 DIAGNOSIS — E78.2 MIXED HYPERLIPIDEMIA: ICD-10-CM

## 2023-01-23 DIAGNOSIS — I49.3 PVC'S (PREMATURE VENTRICULAR CONTRACTIONS): ICD-10-CM

## 2023-01-23 DIAGNOSIS — I44.1 MOBITZ (TYPE) I (WENCKEBACH'S) ATRIOVENTRICULAR BLOCK: ICD-10-CM

## 2023-01-23 DIAGNOSIS — I48.0 PAROXYSMAL ATRIAL FIBRILLATION (HCC): ICD-10-CM

## 2023-01-23 DIAGNOSIS — I47.29 NSVT (NONSUSTAINED VENTRICULAR TACHYCARDIA) (HCC): ICD-10-CM

## 2023-01-23 DIAGNOSIS — Z86.79 HISTORY OF RHEUMATIC FEVER: ICD-10-CM

## 2023-01-23 DIAGNOSIS — I49.1 APC (ATRIAL PREMATURE CONTRACTIONS): ICD-10-CM

## 2023-01-23 DIAGNOSIS — R60.0 BILATERAL LEG EDEMA: ICD-10-CM

## 2023-01-23 DIAGNOSIS — Z86.79 HISTORY OF HYPERTENSION: ICD-10-CM

## 2023-01-23 PROBLEM — H52.00 HYPERMETROPIA: Status: ACTIVE | Noted: 2020-01-22

## 2023-01-23 PROBLEM — E87.6 HYPOKALEMIA: Status: ACTIVE | Noted: 2020-06-10

## 2023-01-23 PROBLEM — H35.039 HYPERTENSIVE RETINOPATHY: Status: ACTIVE | Noted: 2020-01-22

## 2023-01-23 PROBLEM — H25.9 AGE-RELATED CATARACT OF BOTH EYES: Status: ACTIVE | Noted: 2017-11-16

## 2023-01-23 PROBLEM — Z90.710 H/O: HYSTERECTOMY: Status: ACTIVE | Noted: 2019-12-05

## 2023-01-23 PROBLEM — M51.36 DEGENERATION OF LUMBAR INTERVERTEBRAL DISC: Status: ACTIVE | Noted: 2023-01-23

## 2023-01-23 PROBLEM — H04.123 DRY EYES: Status: ACTIVE | Noted: 2017-11-16

## 2023-01-23 PROBLEM — H52.4 PRESBYOPIA: Status: ACTIVE | Noted: 2020-01-22

## 2023-01-23 PROBLEM — E11.40 TYPE 2 DIABETES MELLITUS WITH DIABETIC NEUROPATHY, UNSPECIFIED (HCC): Status: ACTIVE | Noted: 2022-08-04

## 2023-01-23 PROBLEM — R26.2 DIFFICULTY IN WALKING, NOT ELSEWHERE CLASSIFIED: Status: ACTIVE | Noted: 2022-08-04

## 2023-01-23 PROBLEM — J45.909 UNSPECIFIED ASTHMA, UNCOMPLICATED: Status: ACTIVE | Noted: 2022-08-04

## 2023-01-23 PROBLEM — R53.81 OTHER MALAISE: Status: ACTIVE | Noted: 2022-08-04

## 2023-01-23 PROBLEM — B95.1 STREPTOCOCCUS, GROUP B, AS THE CAUSE OF DISEASES CLASSIFIED ELSEWHERE: Status: ACTIVE | Noted: 2022-08-04

## 2023-01-23 PROBLEM — M23.52 CHRONIC INSTABILITY OF KNEE, LEFT KNEE: Status: ACTIVE | Noted: 2022-08-04

## 2023-01-23 PROBLEM — R01.1 MURMUR: Status: ACTIVE | Noted: 2019-04-24

## 2023-01-23 PROBLEM — Z79.891 LONG-TERM CURRENT USE OF OPIATE ANALGESIC: Status: ACTIVE | Noted: 2023-01-23

## 2023-01-23 PROBLEM — I34.0 MITRAL VALVE REGURGITATION: Status: ACTIVE | Noted: 2019-06-07

## 2023-01-23 PROBLEM — M51.369 DEGENERATION OF LUMBAR INTERVERTEBRAL DISC: Status: ACTIVE | Noted: 2023-01-23

## 2023-01-23 PROBLEM — R52 PAIN, UNSPECIFIED: Status: ACTIVE | Noted: 2022-08-04

## 2023-01-23 PROBLEM — H11.009 PTERYGIUM: Status: ACTIVE | Noted: 2022-03-03

## 2023-01-23 PROCEDURE — 99214 OFFICE O/P EST MOD 30 MIN: CPT | Performed by: NURSE PRACTITIONER

## 2023-01-23 RX ORDER — MAGNESIUM OXIDE 400 MG/1
400 TABLET ORAL DAILY
COMMUNITY
End: 2024-01-02

## 2023-01-23 RX ORDER — LANOLIN ALCOHOL/MO/W.PET/CERES
CREAM (GRAM) TOPICAL
COMMUNITY
Start: 2023-01-20 | End: 2023-04-18

## 2023-01-23 RX ORDER — PANTOPRAZOLE SODIUM 20 MG/1
TABLET, DELAYED RELEASE ORAL
COMMUNITY
Start: 2023-01-20 | End: 2023-01-23

## 2023-01-23 RX ORDER — FUROSEMIDE 20 MG/1
20 TABLET ORAL DAILY
Qty: 30 TABLET | Refills: 0 | Status: SHIPPED | OUTPATIENT
Start: 2023-01-23 | End: 2023-04-28 | Stop reason: SDUPTHER

## 2023-01-23 RX ORDER — CETIRIZINE HYDROCHLORIDE 10 MG/1
TABLET ORAL
COMMUNITY
Start: 2023-01-20 | End: 2023-04-18

## 2023-01-23 RX ORDER — POTASSIUM CHLORIDE 750 MG/1
10 CAPSULE, EXTENDED RELEASE ORAL DAILY
Qty: 30 CAPSULE | Refills: 0 | Status: SHIPPED | OUTPATIENT
Start: 2023-01-23 | End: 2023-05-08 | Stop reason: SDUPTHER

## 2023-01-23 RX ORDER — FERROUS SULFATE 325(65) MG
325 TABLET ORAL DAILY
COMMUNITY
End: 2024-01-02

## 2023-01-23 RX ORDER — ACETAMINOPHEN 325 MG/1
650 TABLET ORAL EVERY 4 HOURS PRN
COMMUNITY
End: 2023-04-18

## 2023-01-23 RX ORDER — BISACODYL 10 MG
10 SUPPOSITORY, RECTAL RECTAL DAILY
COMMUNITY
End: 2023-04-18

## 2023-01-23 RX ORDER — LIDOCAINE AND PRILOCAINE 25; 25 MG/G; MG/G
CREAM TOPICAL
COMMUNITY
Start: 2023-01-20 | End: 2023-04-18

## 2023-01-23 ASSESSMENT — FIBROSIS 4 INDEX: FIB4 SCORE: 2.828427124746190097

## 2023-01-23 NOTE — PROGRESS NOTES
"      Cardiology Clinic Follow-up Note    Date of note:    1/23/2023  Primary Care Provider: Juanita King N.P.    Name:             April Alicia  YOB: 1960  MRN:               [id number]    CC: Reestablish with cardiology    Primary Cardiologist: Previously Dr Dixon    Patient HPI:   April Alicia is a 61 y.o. female with current medical problems including hypertension, diabetes, alcoholic liver cirrhosis and history of rheumatic fever At the age of 26.     Interim History:  Since last office visit patient was seen in the ED on 12/26 for a septic joint at the left knee.   Currently at a SNF since discharge from hospital.  Gets dizzy with palpitations   Was taken off of apixaban during last hospitalization due to low H&H.  Noticed more swelling to her bilateral lower extremities as she spends most of her time sitting in a wheelchair.  She denies chest pain, shortness of breath, dyspnea on exertion, syncopal episodes, orthopnea, PND, and recent weight gain.     Per my last office visit note on 2/24/2022  Pt presents today still feeling liker her \"heart hurts\" and feeling chest pressure.  She did not schedule her nuclear medicine stress test that was ordered at her last appointment.  She also feels much more tired today than usual.    She denies palpitations, shortness of breath, dyspnea on exertion, dizziness or syncopal episodes, orthopnea, PND, lower extremity swelling, and recent weight gain.     Per last office visit note on 1/25/22  Ms. Alicia was last seen in this cardiology office by Dr. Livingston in 2017, lost to follow-up.  During that visit she was placed on a Holter monitor for her bradycardia.    She has been seeing Dr. Dixon ( Clinic), has not seen him in 1 yr. She says he diagnosed her with atrial fibrillation.    During this past summer in [place] she had complications with L knee surgery, infections, and needing skin grafts. Was in the hospital 3.5 " months.  Never noted to be in A. fib    She was told she had sleep apnea, but then it improved and her machine was taken back by company.      Per patient she has stopped consuming alcohol.    She get chest pain/pressure when she cleans the house, she feels this exhausts her, also causes her to sweat and have SOB.  She does feel palpitations at times.  She has positive swelling to bilateral lower extremities, on furosemide after her discharge from the hospital in [place], ran out at the end of November and notices that her swelling is increasing.    She denies dizziness or syncopal episodes, orthopnea, PND, and recent weight gain.     Patient endorses medication compliance.  Has never been on a statin medication    Cardiovascular Risk Factors:  1. Smoking status: former, quit last year  2. Type II Diabetes Mellitus: yes  Lab Results   Component Value Date/Time    HBA1C 5.4 01/01/2023 03:23 AM    HBA1C 5.7 (H) 07/12/2022 03:54 AM     3. Hypertension: Yes  4. Dyslipidemia:   Cholesterol,Tot   Date Value Ref Range Status   09/04/2017 95 (L) 100 - 199 mg/dL Final     LDL   Date Value Ref Range Status   09/04/2017 52 <100 mg/dL Final     HDL   Date Value Ref Range Status   09/04/2017 29 (A) >=40 mg/dL Final     Triglycerides   Date Value Ref Range Status   09/04/2017 69 0 - 149 mg/dL Final     5. Family history of early Coronary Artery Disease in a first degree relative (Male less than 55 years of age; Female less than 65 years of age): no  6.  Obesity and/or Metabolic Syndrome: BMI 28  7. Sedentary lifestyle: does House chores    Review of systems:  All others systems reviewed and negative except for what is outlined in the above HPI    Past Medical History:   Diagnosis Date    Arrhythmia     Arthritis     OA in knees    ASTHMA     Blood clotting disorder (HCC) 2018    right leg blood clot    Dental disorder     upper and lower dentures    Diabetes     Emphysema of lung (HCC)     Heart abnormality     leakage in left  valve    Heart burn     left knee    Heart valve disease     Hypertension     Infection 9/4/2017    Infection 2018    Right knee after total knee    Liver disease     Pneumonia 02/2020    Seizure disorder (HCC)      Past Surgical History:   Procedure Laterality Date    PB REVISE KNEE JOINT REPLACE,ALL PARTS Left 12/28/2022    Procedure: REVISION, TOTAL ARTHROPLASTY, KNEE, ALL COMPONENTS-LEFT;  Surgeon: Wild Luz M.D.;  Location: SURGERY [facility];  Service: Orthopedics    IRRIGATION & DEBRIDEMENT GENERAL Left 12/28/2022    Procedure: IRRIGATION AND DEBRIDEMENT, WOUND;  Surgeon: Wild Luz M.D.;  Location: [facility];  Service: Orthopedics    PB REVISE KNEE JOINT REPLACE,ALL PARTS Left 7/19/2022    Procedure: REVISION, TOTAL ARTHROPLASTY, KNEE, ALL COMPONENTS - ANTIBIOTIC SPACER;  Surgeon: Wild Luz M.D.;  Location: [facility];  Service: Orthopedics    IRRIGATION & DEBRIDEMENT GENERAL Left 7/17/2022    Procedure: IRRIGATION AND DEBRIDEMENT, SHOULDER;  Surgeon: Obdulio Gray M.D.;  Location: [facility];  Service: Orthopedics    PB TOTAL KNEE ARTHROPLASTY Left 8/24/2020    Procedure: ARTHROPLASTY, KNEE, TOTAL;  Surgeon: Víctor Faustin M.D.;  Location: [facility];  Service: Orthopedics    KNEE ARTHROPLASTY TOTAL Right 2018    IRRIGATION & DEBRIDEMENT ORTHO Right 9/3/2017    Procedure: IRRIGATION & DEBRIDEMENT ORTHO, POLY EXCHANGE;  Surgeon: Jerome Russell M.D.;  Location: [facility];  Service: Orthopedics    COLONOSCOPY WITH CLIPPING  10/28/2015    Procedure: COLONOSCOPY WITH CLIPPING;  Surgeon: Ruben Colon M.D.;  Location: [facility];  Service:     COLONOSCOPY WITH SCLEROTHERAPY  10/28/2015    Procedure: COLONOSCOPY WITH SCLEROTHERAPY;  Surgeon: Ruben Colon M.D.;  Location: [facility]COPY [facility];  Service:     COLONOSCOPY WITH TATTOOING  10/28/2015    Procedure: COLONOSCOPY WITH TATTOOING;  Surgeon:  Ruben Colon M.D.;  Location: ENDOSCOPY [facility];  Service:     GYN SURGERY      hysteroscoopy    GYN SURGERY      tubal ligation     History reviewed. No pertinent family history.  Social History     Socioeconomic History    Marital status: Single     Spouse name: Not on file    Number of children: Not on file    Years of education: Not on file    Highest education level: Not on file   Occupational History    Not on file   Tobacco Use    Smoking status: Former     Types: Cigarettes     Quit date: 2016     Years since quittin.0    Smokeless tobacco: Former     Quit date: 2021    Tobacco comments:     a few a day when I can   Vaping Use    Vaping Use: Never used   Substance and Sexual Activity    Alcohol use: No    Drug use: No    Sexual activity: Not on file   Other Topics Concern    Not on file   Social History Narrative    Not on file     Social Determinants of Health     Financial Resource Strain: Not on file   Food Insecurity: Not on file   Transportation Needs: Not on file   Physical Activity: Not on file   Stress: Not on file   Social Connections: Not on file   Intimate Partner Violence: Not on file   Housing Stability: Not on file     Allergies   Allergen Reactions    Nkda [No Known Drug Allergy]      Current Outpatient Medications   Medication Sig Dispense Refill    acetaminophen (TYLENOL) 325 MG Tab Take 650 mg by mouth every four hours as needed.      bisacodyl (BISACODYL LAXATIVE) 10 MG Suppos Insert 10 mg into the rectum every day.      furosemide (LASIX) 20 MG Tab Take 1 Tablet by mouth every day. 30 Tablet 0    potassium chloride (MICRO-K) 10 MEQ capsule Take 1 Capsule by mouth every day. 30 Capsule 0    zinc sulfate (ZINCATE) 220 (50 Zn) MG Cap Take 1 Capsule by mouth every day. 30 Capsule 3    calcium carbonate 1000 MG Chew Tab Chew 1 Tablet 3 times a day as needed (heartburn). 30 Tablet     vitamin D2, Ergocalciferol, (DRISDOL) 1.25 MG (11805 UT) Cap capsule Take 1  "Capsule by mouth every 7 days. 5 Capsule     lactulose 20 GM/30ML Solution Take 15 mL by mouth every day. 450 mL     MULTAQ 400 MG Tab Take 1 Tablet by mouth 2 times a day with meals. 60 Tablet     omeprazole (PRILOSEC) 20 MG delayed-release capsule Take 1 Capsule by mouth every 12 hours. 30 Capsule     atorvastatin (LIPITOR) 20 MG Tab Take 1 Tablet by mouth every evening. 90 Tablet 3    cefTRIAXone (ROCEPHIN) 2 g/20 mL Infuse 20 mL into a venous catheter every 24 hours for 27 days. 540 mL 0    SPIRIVA HANDIHALER 18 MCG Cap Place 1 Capsule into inhaler and inhale every day.      gabapentin (NEURONTIN) 400 MG Cap Take 400 mg by mouth 2 times a day.      albuterol (VENTOLIN OR PROVENTIL) 108 (90 BASE) MCG/ACT Aero Soln inhalation aerosol Inhale 2 Puffs every four hours as needed for Shortness of Breath.      sodium bicarbonate (SODIUM BICARBONATE) 650 MG Tab Take 2 Tablets by mouth 2 times a day. 120 Tablet 3     No current facility-administered medications for this visit.       Physical Exam:  Ambulatory Vitals  /78 (BP Location: Left arm, Patient Position: Sitting, BP Cuff Size: Adult)   Pulse 60   Resp 12   Ht 1.715 m (5' 7.5\")   Wt 76.2 kg (168 lb)   SpO2 98%    BP Readings from Last 4 Encounters:   01/23/23 128/78   01/10/23 117/64   10/27/22 (!) 144/74   08/04/22 122/70       Weight/BMI: Body mass index is 25.92 kg/m².  Wt Readings from Last 4 Encounters:   01/23/23 76.2 kg (168 lb)   01/07/23 77.1 kg (170 lb)   10/27/22 77.9 kg (171 lb 11.8 oz)   07/23/22 95.1 kg (209 lb 10.5 oz)     General: No apparent distress. Well nourished. [place]  Neck: No JVD. No caroid bruits, trachea midline. R carotid artery visibly pulsating, bilateral clavicles protruding  Lungs: CTAB. Normal effort, withouyt crackles/rhonchi, no wheezing  Heart: Bradycardia.  Normal S1/S2, no murmur, no rub. no lower extremity edema. 2+ radial pulses, 2+ DT pulses  Ext: No clubbing or cyanosis.  Abdomen: soft, non tender, non distended, no " mack hepatomegaly.  Neurological: No focal deficits, no facial asymmetry.  Normal speech.  Psychiatric: Appropriate affect, alert and oriented x 4.   Skin: Warm and dry, no rash.    Lab Data Review:  Lab Results   Component Value Date/Time    CHOLSTRLTOT 95 (L) 2017 05:48 AM    LDL 52 2017 05:48 AM    HDL 29 (A) 2017 05:48 AM    TRIGLYCERIDE 69 2017 05:48 AM       Lab Results   Component Value Date/Time    SODIUM 133 (L) 2023 12:49 AM    POTASSIUM 4.2 2023 12:49 AM    CHLORIDE 106 2023 12:49 AM    CO2 18 (L) 2023 12:49 AM    GLUCOSE 140 (H) 2023 12:49 AM    BUN 11 2023 12:49 AM    CREATININE 0.65 2023 12:49 AM    CREATININE 0.9 2009 06:50 PM    BUNCREATRAT 12.0 2022 10:02 PM     Lab Results   Component Value Date/Time    ALKPHOSPHAT 158 (H) 2023 05:18 AM    ASTSGOT 16 2023 05:18 AM    ALTSGPT 8 2023 05:18 AM    TBILIRUBIN 0.5 2023 05:18 AM      Lab Results   Component Value Date/Time    WBC 4.9 2023 12:49 AM     Cardiac Imaging and Procedures Review:      EKG 2020: My Personal interpretation reveals SR 54  EKG: My Personal interpretation reveals SB 49    Echo 20:  Left ventricular ejection fraction is visually estimated to be 60%.  Normal diastolic function.  Mild mitral regurgitation.  Mild tricuspid regurgitation.  Estimated right ventricular systolic pressure  is 37 mmHg.    Echo 22  Normal left ventricular systolic function.  The left ventricular ejection fraction is visually estimated to be 60%.  Moderate concentric left ventricular hypertrophy.  Mild mitral regurgitation.  Normal right ventricular size and systolic function.     Assessment and Clinical Decision Makin. Paroxysmal atrial fibrillation (HCC)  Holter Monitor Study      2. History of hypertension        3. History of rheumatic fever        4. Mixed hyperlipidemia        5. Bilateral leg edema  furosemide (LASIX) 20 MG Tab     potassium chloride (MICRO-K) 10 MEQ capsule      6. Primary hypertension            The following treatment plan was discussed    Paroxysmal atrial fibrillation  -Diagnosed by Dr. Dixon, records unable to obtained from [facility] after last office visit, will reattempt  -Continue Multaq 400 mg bid   -Her apixaban was stopped d/t bleeding, ok to remain off of currently  -[id number] Score: 3   -Will place patient on a 30 day Biotel to assess for Afib  -Hx frequent PAC's per 24 hr Holter in 2017, not Afib noted    DM2  -Continue atorvastatin 20 mg, medium intensity statin  -Check lipid panel, did not get checked after last visit  -Hemoglobin A1c at goal of 5.4 currently  -Medications per PCP    HLD  -No recent lipid panel  -Continue on atorvastatin 20 mg  -Obtain a lipid panel with PCP    Bilateral lower extremity edema  -Chronic problem post multiple knee and leg surgeries tear  -Furosemide 20 mg daily, potassium to 10 meq's daily x 1 week then prn for bilateral lower extremity increasing edema or increase in weight    Hx HTN  -BP well controlled today at appointment  -No current antihypertensive medications  Plan reviewed in detail with the patient, verbalizes understanding and is in agreement.    Encouraged Pt to follow up with us over the phone or electronically using my [employer] as cardiac issues/concerns arise.      PLEASE NOTE: This dictation was created using voice recognition software. I have made every reasonable attempt to correct obvious errors, but I expect that there are errors of grammar and possibly content that I did not discover before finalizing the note.       EDITH Beach.   [facility]  [phone]    Collaborating Physician: Dr. Boston

## 2023-01-23 NOTE — PATIENT INSTRUCTIONS
Start furosemide 20mg daily and furosemide 10- meqs daily x 1 week. Then take as needed for increased swelling to legs.   Ok to stay off of apixaban, we will assess for Afib on the 30 days heart monitor.

## 2023-01-23 NOTE — PROGRESS NOTES
Patient enrolled in the 30 day [employer] heart monitoring program, per Kirstie Whipple A.P.R.N.  >In clinic hook up, monitor S/N [id number].  >Baseline Transmitted.  >Pending EOS.

## 2023-01-25 ENCOUNTER — HOSPITAL ENCOUNTER (OUTPATIENT)
Facility: MEDICAL CENTER | Age: 63
End: 2023-01-25
Attending: FAMILY MEDICINE
Payer: MEDICAID

## 2023-01-25 LAB
CRP SERPL HS-MCNC: 1.49 MG/DL (ref 0–0.75)
ERYTHROCYTE [SEDIMENTATION RATE] IN BLOOD BY WESTERGREN METHOD: 42 MM/HOUR (ref 0–25)

## 2023-01-25 PROCEDURE — 85652 RBC SED RATE AUTOMATED: CPT

## 2023-01-25 PROCEDURE — 86140 C-REACTIVE PROTEIN: CPT

## 2023-02-01 ENCOUNTER — HOSPITAL ENCOUNTER (OUTPATIENT)
Facility: MEDICAL CENTER | Age: 63
End: 2023-02-01
Attending: FAMILY MEDICINE
Payer: MEDICAID

## 2023-02-01 LAB
CRP SERPL HS-MCNC: 1.17 MG/DL (ref 0–0.75)
ERYTHROCYTE [SEDIMENTATION RATE] IN BLOOD BY WESTERGREN METHOD: 28 MM/HOUR (ref 0–25)

## 2023-02-01 PROCEDURE — 85652 RBC SED RATE AUTOMATED: CPT

## 2023-02-01 PROCEDURE — 86140 C-REACTIVE PROTEIN: CPT

## 2023-02-02 ENCOUNTER — TELEPHONE (OUTPATIENT)
Dept: CARDIOLOGY | Facility: MEDICAL CENTER | Age: 63
End: 2023-02-02
Payer: MEDICAID

## 2023-02-02 ENCOUNTER — TELEPHONE (OUTPATIENT)
Dept: INFECTIOUS DISEASES | Facility: MEDICAL CENTER | Age: 63
End: 2023-02-02
Payer: MEDICAID

## 2023-02-02 NOTE — TELEPHONE ENCOUNTER
Teena  You; Cade SULLIVAN, Med Ass't; Emma ARGUETA, Med Ass't 34 minutes ago (11:45 AM)     Thank you Cade, spoke with patient and her RN in rehab. Advised RN how to take off patch and charge sensor, then snap sensor back in new patch and place on patient.      Noted above per Teena.

## 2023-02-02 NOTE — TELEPHONE ENCOUNTER
MR    Caller: April    Topic/issue: Pt called and is wanting to speak to someone about her Biotel monitor. She is stating it keeps beeping and no longer wants to wear it. She said that she tried calling that company, but no one is answering.    Callback Number: [phone]

## 2023-02-08 ENCOUNTER — TELEPHONE (OUTPATIENT)
Dept: INFECTIOUS DISEASES | Facility: MEDICAL CENTER | Age: 63
End: 2023-02-08
Payer: MEDICAID

## 2023-02-08 ENCOUNTER — HOSPITAL ENCOUNTER (OUTPATIENT)
Facility: MEDICAL CENTER | Age: 63
End: 2023-02-08
Attending: FAMILY MEDICINE
Payer: MEDICAID

## 2023-02-08 LAB
CRP SERPL HS-MCNC: 11.2 MG/L (ref 0–3)
ERYTHROCYTE [SEDIMENTATION RATE] IN BLOOD BY WESTERGREN METHOD: 28 MM/HOUR (ref 0–25)

## 2023-02-08 PROCEDURE — 85652 RBC SED RATE AUTOMATED: CPT

## 2023-02-08 PROCEDURE — 86141 C-REACTIVE PROTEIN HS: CPT

## 2023-02-08 NOTE — TELEPHONE ENCOUNTER
Patient may be out of SNF, called to schedule appt this week to [facility] with our office no answer no voicemail set up

## 2023-02-10 ENCOUNTER — TELEPHONE (OUTPATIENT)
Dept: INFECTIOUS DISEASES | Facility: MEDICAL CENTER | Age: 63
End: 2023-02-10
Payer: MEDICAID

## 2023-02-11 NOTE — TELEPHONE ENCOUNTER
Received a phone call from [facility] NP in regards to appt and oral abx for patient. [facility]BO saw patient this week and mentioned she should be on long term oral abx that need to  be prescribed by ID. We have made 2 calls to get patient scheduled with no success. I was provided with [facility] phone number DON in charge I left her a message to fax notes and labs and return my call to bring patient in for appt next week. I also called [employer] again. PT's IV abx ended 2/7

## 2023-02-16 ENCOUNTER — TELEPHONE (OUTPATIENT)
Dept: INFECTIOUS DISEASES | Facility: MEDICAL CENTER | Age: 63
End: 2023-02-16

## 2023-02-16 ENCOUNTER — OFFICE VISIT (OUTPATIENT)
Dept: INFECTIOUS DISEASES | Facility: MEDICAL CENTER | Age: 63
End: 2023-02-16
Attending: NURSE PRACTITIONER
Payer: MEDICAID

## 2023-02-16 VITALS
OXYGEN SATURATION: 97 % | TEMPERATURE: 97.9 F | RESPIRATION RATE: 16 BRPM | WEIGHT: 168 LBS | SYSTOLIC BLOOD PRESSURE: 132 MMHG | BODY MASS INDEX: 25.46 KG/M2 | DIASTOLIC BLOOD PRESSURE: 80 MMHG | HEART RATE: 68 BPM | HEIGHT: 68 IN

## 2023-02-16 DIAGNOSIS — T84.59XD INFECTION OF PROSTHETIC KNEE JOINT, SUBSEQUENT ENCOUNTER: ICD-10-CM

## 2023-02-16 DIAGNOSIS — Z96.659 INFECTION OF PROSTHETIC KNEE JOINT, SUBSEQUENT ENCOUNTER: ICD-10-CM

## 2023-02-16 DIAGNOSIS — E11.9 TYPE 2 DIABETES MELLITUS WITHOUT COMPLICATION, WITHOUT LONG-TERM CURRENT USE OF INSULIN (HCC): ICD-10-CM

## 2023-02-16 DIAGNOSIS — B95.1 STREPTOCOCCUS, GROUP B, AS THE CAUSE OF DISEASES CLASSIFIED ELSEWHERE: ICD-10-CM

## 2023-02-16 DIAGNOSIS — F19.91 HISTORY OF ILLICIT DRUG USE: ICD-10-CM

## 2023-02-16 DIAGNOSIS — M00.262 STREPTOCOCCAL ARTHRITIS OF LEFT KNEE (HCC): ICD-10-CM

## 2023-02-16 PROCEDURE — 99213 OFFICE O/P EST LOW 20 MIN: CPT | Performed by: NURSE PRACTITIONER

## 2023-02-16 PROCEDURE — 99211 OFF/OP EST MAY X REQ PHY/QHP: CPT | Performed by: NURSE PRACTITIONER

## 2023-02-16 RX ORDER — AMOXICILLIN AND CLAVULANATE POTASSIUM 500; 125 MG/1; MG/1
1 TABLET, FILM COATED ORAL 2 TIMES DAILY
Qty: 120 TABLET | Refills: 1 | Status: SHIPPED | OUTPATIENT
Start: 2023-02-16 | End: 2023-03-16 | Stop reason: SDUPTHER

## 2023-02-16 ASSESSMENT — FIBROSIS 4 INDEX: FIB4 SCORE: 2.828427124746190097

## 2023-02-16 NOTE — PROGRESS NOTES
INFECTIOUS  DISEASE  OUTPATIENT CLINIC  NOTE   Subjective   Primary care provider: Karen Mcclure P.A.-C..     Reason for Follow Up:   Follow-up for   1. Type 2 diabetes mellitus without complication, without long-term current use of insulin (HCC)        2. Streptococcus, group b, as the cause of diseases classified elsewhere        3. Streptococcal arthritis of left knee (HCC)        4. Infection of prosthetic knee joint, subsequent encounter        5. History of illicit drug use            HPI: Patient previously seen and treated by ID team as inpatient during hospital admission.   April Alicia is a 62 y.o. female with a history of alcohol and cocaine abuse, cirrhosis, asthma, A-fib on apixaban, 2 diabetes, hypertension. Patient was admitted on July 2022 with a group B strep bacteremia and left knee prosthetic joint infection along with a left shoulder septic arthritis and some subdeltoid abscess.  Plan was for her to discharge to a skilled nursing facility and continue IV ceftriaxone for 6 weeks until 8/30/2022 due to some unknown reason patient only received 4 weeks of antibiotics.  Patient was readmitted on 12/26/2022 due to recurrent left knee pain and swelling.  She was taken back to the OR on 12/28 underwent explantation and placement of articulating spacer, wound VAC.  Multiple OR cultures again growing group B Strep.   Underwent synovial fluid removal from the left shoulder on 1/2/2022 WBC 1051, 40% polys, 40% lymphs, not consistent with infection.  Plan at discharge with 6 weeks of IV antibiotics ceftriaxone 2 g daily till 2/7/2023 02/16/23- Today Patient reports feeling well. Pt stating that the wound is healing well. Pt being followed by the [facility]. Wound pictures reviewed in EPIC. Denies drainage, pungent odor, redness, pain. Denies feeling generally ill, fevers/chills, general malaise, headache, n/v/d.     Current Antimicrobials: Augmentin 500/125 twice daily  Previous  Antimicrobials: Unasyn, ceftriaxone, Ancef, cefazolin    Other Current Medications:  Home Medications    Medication Sig Taking? Last Dose Authorizing Provider   acetaminophen (TYLENOL) 325 MG Tab Take 650 mg by mouth every four hours as needed.   Physician Outpatient   bisacodyl (BISACODYL LAXATIVE) 10 MG Suppos Insert 10 mg into the rectum every day.   Physician Outpatient   furosemide (LASIX) 20 MG Tab Take 1 Tablet by mouth every day.   ALEJO Beach.P.R.N.   potassium chloride (MICRO-K) 10 MEQ capsule Take 1 Capsule by mouth every day.   ALEJO Beach.P.R.N.   cetirizine (ZYRTEC) 10 MG Tab    Physician Outpatient   ferrous sulfate 325 (65 Fe) MG tablet Take  by mouth.   Physician Outpatient   lidocaine-prilocaine (EMLA) 2.5-2.5 % Cream    Physician Outpatient   magnesium oxide (MAG-OX) 400 MG Tab tablet    Physician Outpatient   Skin Protectants, Misc. (MINERIN CREME) Cream    Physician Outpatient   zinc sulfate (ZINCATE) 220 (50 Zn) MG Cap Take 1 Capsule by mouth every day.   Rhys Martínez D.O.   sodium bicarbonate (SODIUM BICARBONATE) 650 MG Tab Take 2 Tablets by mouth 2 times a day.   Rhys Martínez D.O.   calcium carbonate 1000 MG Chew Tab Chew 1 Tablet 3 times a day as needed (heartburn).   Rhys Martínez D.O.   vitamin D2, Ergocalciferol, (DRISDOL) 1.25 MG (74061 UT) Cap capsule Take 1 Capsule by mouth every 7 days.   Rhys Martínez D.O.   lactulose 20 GM/30ML Solution Take 15 mL by mouth every day.   Rhys Martínez D.O.   MULTAQ 400 MG Tab Take 1 Tablet by mouth 2 times a day with meals.   Rhys Martínez D.O.   omeprazole (PRILOSEC) 20 MG delayed-release capsule Take 1 Capsule by mouth every 12 hours.   Rhys Martínez D.O.   atorvastatin (LIPITOR) 20 MG Tab Take 1 Tablet by mouth every evening.   Rhys Martínez D.O.   SPIRIVA HANDIHALER 18 MCG Cap Place 1 Capsule into inhaler and inhale every day.   Physician Outpatient   gabapentin (NEURONTIN) 400 MG Cap Take 400 mg by mouth 2 times a  day.   Physician Outpatient   albuterol (VENTOLIN OR PROVENTIL) 108 (90 BASE) MCG/ACT Aero Soln inhalation aerosol Inhale 2 Puffs every four hours as needed for Shortness of Breath.   Physician Outpatient        PMH:  Past Medical History:   Diagnosis Date    Arrhythmia     Arthritis     OA in knees    ASTHMA     Blood clotting disorder (HCC) 2018    right leg blood clot    Dental disorder     upper and lower dentures    Diabetes     Emphysema of lung (HCC)     Heart abnormality     leakage in left valve    Heart burn     left knee    Heart valve disease     Hypertension     Infection 9/4/2017    Infection 2018    Right knee after total knee    Liver disease     Pneumonia 02/2020    Seizure disorder ([facility])      Past Surgical History:   Procedure Laterality Date    PB REVISE KNEE JOINT REPLACE,ALL PARTS Left 12/28/2022    Procedure: REVISION, TOTAL ARTHROPLASTY, KNEE, ALL COMPONENTS-LEFT;  Surgeon: Wild Luz M.D.;  Location: [facility];  Service: Orthopedics    IRRIGATION & DEBRIDEMENT GENERAL Left 12/28/2022    Procedure: IRRIGATION AND DEBRIDEMENT, WOUND;  Surgeon: Wild Luz M.D.;  Location: [facility];  Service: Orthopedics    PB REVISE KNEE JOINT REPLACE,ALL PARTS Left 7/19/2022    Procedure: REVISION, TOTAL ARTHROPLASTY, KNEE, ALL COMPONENTS - ANTIBIOTIC SPACER;  Surgeon: Wild Luz M.D.;  Location: [facility];  Service: Orthopedics    IRRIGATION & DEBRIDEMENT GENERAL Left 7/17/2022    Procedure: IRRIGATION AND DEBRIDEMENT, SHOULDER;  Surgeon: Obdulio Gray M.D.;  Location: [facility];  Service: Orthopedics    PB TOTAL KNEE ARTHROPLASTY Left 8/24/2020    Procedure: ARTHROPLASTY, KNEE, TOTAL;  Surgeon: Víctor Faustin M.D.;  Location: [facility];  Service: Orthopedics    KNEE ARTHROPLASTY TOTAL Right 2018    IRRIGATION & DEBRIDEMENT ORTHO Right 9/3/2017    Procedure: IRRIGATION & DEBRIDEMENT ORTHO, POLY EXCHANGE;  Surgeon: Jerome VALERIO  DENNIS Russell;  Location: SURGERY [facility];  Service: Orthopedics    COLONOSCOPY WITH CLIPPING  10/28/2015    Procedure: COLONOSCOPY WITH CLIPPING;  Surgeon: Ruben Colon M.D.;  Location: [facility];  Service:     COLONOSCOPY WITH SCLEROTHERAPY  10/28/2015    Procedure: COLONOSCOPY WITH SCLEROTHERAPY;  Surgeon: Ruben Colon M.D.;  Location: [facility];  Service:     COLONOSCOPY WITH TATTOOING  10/28/2015    Procedure: COLONOSCOPY WITH TATTOOING;  Surgeon: Ruben Colon M.D.;  Location: [facility];  Service:     GYN SURGERY      hysteroscoopy    GYN SURGERY      tubal ligation     No family history on file.  Social History     Socioeconomic History    Marital status: Single     Spouse name: Not on file    Number of children: Not on file    Years of education: Not on file    Highest education level: Not on file   Occupational History    Not on file   Tobacco Use    Smoking status: Former     Types: Cigarettes     Quit date: 2016     Years since quittin.1    Smokeless tobacco: Former     Quit date: 2021    Tobacco comments:     a few a day when I can   Vaping Use    Vaping Use: Never used   Substance and Sexual Activity    Alcohol use: No    Drug use: No    Sexual activity: Not on file   Other Topics Concern    Not on file   Social History Narrative    Not on file     Social Determinants of Health     Financial Resource Strain: Not on file   Food Insecurity: Not on file   Transportation Needs: Not on file   Physical Activity: Not on file   Stress: Not on file   Social Connections: Not on file   Intimate Partner Violence: Not on file   Housing Stability: Not on file           Allergies/Intolerances:  Allergies   Allergen Reactions    Nkda [No Known Drug Allergy]        ROS:   ROS   ROS was reviewed and were negative except as above.    Objective    Most Recent Vital Signs:  Last Menstrual Period 2008     Physical Exam:  Physical  Exam  Vitals and nursing note reviewed.   Constitutional:       General: She is not in acute distress.     Appearance: She is not ill-appearing.      Comments: Wheelchair   HENT:      Head: Normocephalic and atraumatic.      Nose: Nose normal.      Mouth/Throat:      Mouth: Mucous membranes are moist.   Eyes:      Pupils: Pupils are equal, round, and reactive to light.   Cardiovascular:      Rate and Rhythm: Normal rate and regular rhythm.   Pulmonary:      Effort: Pulmonary effort is normal. No respiratory distress.      Breath sounds: Normal breath sounds. No stridor.   Abdominal:      General: There is no distension.      Palpations: Abdomen is soft.   Musculoskeletal:         General: Normal range of motion.      Cervical back: Normal range of motion.      Comments:  Left knee nearly completely healed besides small 1 cm inferior wound along surgical scar.  Slight erythema, boggy to palpation, warmth noted.  No streaking redness   Skin:     General: Skin is warm and dry.      Coloration: Skin is not jaundiced or pale.      Findings: Rash (Left groin/pannus redness) present.   Neurological:      General: No focal deficit present.      Mental Status: She is alert and oriented to person, place, and time.      Cranial Nerves: No cranial nerve deficit.      Sensory: No sensory deficit.        Pertinent Lab/Imaging Results:  [unfilled]  @CMP@  WBC   Date/Time Value Ref Range Status   01/08/2023 12:49 AM 4.9 4.8 - 10.8 K/uL Final     RBC   Date/Time Value Ref Range Status   01/08/2023 12:49 AM 2.70 (L) 4.20 - 5.40 M/uL Final     Hemoglobin   Date/Time Value Ref Range Status   01/08/2023 12:49 AM 7.8 (L) 12.0 - 16.0 g/dL Final     Hematocrit   Date/Time Value Ref Range Status   01/08/2023 12:49 AM 24.7 (L) 37.0 - 47.0 % Final     MCV   Date/Time Value Ref Range Status   01/08/2023 12:49 AM 91.5 81.4 - 97.8 fL Final     MCH   Date/Time Value Ref Range Status   01/08/2023 12:49 AM 28.9 27.0 - 33.0 pg Final     MCHC    Date/Time Value Ref Range Status   01/08/2023 12:49 AM 31.6 (L) 33.6 - 35.0 g/dL Final     MPV   Date/Time Value Ref Range Status   01/08/2023 12:49 AM 10.8 9.0 - 12.9 fL Final      Sodium   Date/Time Value Ref Range Status   01/08/2023 12:49  (L) 135 - 145 mmol/L Final     Potassium   Date/Time Value Ref Range Status   01/08/2023 12:49 AM 4.2 3.6 - 5.5 mmol/L Final     Chloride   Date/Time Value Ref Range Status   01/08/2023 12:49  96 - 112 mmol/L Final     Co2   Date/Time Value Ref Range Status   01/08/2023 12:49 AM 18 (L) 20 - 33 mmol/L Final     Glucose   Date/Time Value Ref Range Status   01/08/2023 12:49  (H) 65 - 99 mg/dL Final     Bun   Date/Time Value Ref Range Status   01/08/2023 12:49 AM 11 8 - 22 mg/dL Final     Creatinine   Date/Time Value Ref Range Status   01/08/2023 12:49 AM 0.65 0.50 - 1.40 mg/dL Final   03/12/2009 06:50 PM 0.9 0.5 - 1.4 mg/dL Final     Bun-Creatinine Ratio   Date/Time Value Ref Range Status   03/08/2022 10:02 PM 12.0  Final     Alkaline Phosphatase   Date/Time Value Ref Range Status   01/05/2023 05:18  (H) 30 - 99 U/L Final     AST(SGOT)   Date/Time Value Ref Range Status   01/05/2023 05:18 AM 16 12 - 45 U/L Final     ALT(SGPT)   Date/Time Value Ref Range Status   01/05/2023 05:18 AM 8 2 - 50 U/L Final     Total Bilirubin   Date/Time Value Ref Range Status   01/05/2023 05:18 AM 0.5 0.1 - 1.5 mg/dL Final      CPK Total   Date/Time Value Ref Range Status   08/04/2022 03:17  (H) 0 - 154 U/L Final          Blood Culture   Date Value Ref Range Status   09/04/2017 No growth after 5 days of incubation.  Final     Blood Culture Hold   Date Value Ref Range Status   10/03/2020 Collected  Final       Blood Culture   Date Value Ref Range Status   09/04/2017 No growth after 5 days of incubation.  Final     Blood Culture Hold   Date Value Ref Range Status   10/03/2020 Collected  Final    Micro:  Results         Procedure Component Value Units Date/Time     COV-2,  FLU A/B, AND RSV BY PCR (2-4 HOURS CEPHEID): Collect NP swab in VTM [[id number]] Collected: 01/05/23 1531     Order Status: Completed Specimen: Respirate from Nasopharyngeal Updated: 01/05/23 1629       Influenza virus A RNA Negative       Influenza virus B, PCR Negative       RSV, PCR Negative       SARS-CoV-2 by PCR NotDetected       Comment: [facility] providers: PLEASE REFER TO DE-ESCALATION AND RETESTING PROTOCOL  on [email or web address]     **The [facility] GeneXpert Xpress SARS-CoV-2 RT-PCR Test has been made  available for use under the Emergency Use Authorization (EUA) only.             SARS-CoV-2 Source NP Swab     Narrative:       Collected By: [id number] JUAN CARLOS RAMOS     URINALYSIS [[id number]]  (Abnormal) Collected: 01/05/23 1515     Order Status: Completed Specimen: Urine, Clean Catch Updated: 01/05/23 1603       Color Yellow       Character Clear       Specific Gravity 1.014       Ph 6.5       Glucose Negative mg/dL         Ketones Negative mg/dL         Protein Negative mg/dL         Bilirubin Negative       Urobilinogen, Urine 0.2       Nitrite Negative       Leukocyte Esterase Negative       Occult Blood Small       Micro Urine Req Microscopic     Narrative:       Collected By: [id number] JUAN CARLOS RAMOS     FLUID CULTURE W/GRAM STAIN [[id number]] Collected: 01/02/23 1545     Order Status: Completed Specimen: Synovial Updated: 01/05/23 0945       Significant Indicator NEG       Source SYNO       Site Left Shoulder       Culture Result No growth at 72 hours.       Gram Stain Result Rare WBCs.  No organisms seen.        BLOOD CULTURE [[id number]] Collected: 01/03/23 0842     Order Status: Completed Specimen: Blood from Peripheral Updated: 01/04/23 0728       Significant Indicator NEG       Source BLD       Site PERIPHERAL       Culture Result No Growth  Note: Blood cultures are incubated for 5 days and  are monitored continuously.Positive blood cultures  are called to the RN and reported as soon as  they are  "identified.        Narrative:       Per Hospital Policy: Only change Specimen Src: to \"Line\" if  specified by physician order.  Right Hand     BLOOD CULTURE [[id number]] Collected: 01/03/23 0842     Order Status: Completed Specimen: Blood from Peripheral Updated: 01/04/23 0728       Significant Indicator NEG       Source BLD       Site PERIPHERAL       Culture Result No Growth  Note: Blood cultures are incubated for 5 days and  are monitored continuously.Positive blood cultures  are called to the RN and reported as soon as  they are identified.        Narrative:       Per Hospital Policy: Only change Specimen Src: to \"Line\" if  specified by physician order.  Left Forearm/Arm     URINALYSIS [[id number]]  (Abnormal) Collected: 01/03/23 1952     Order Status: Completed Specimen: Urine, Cath Updated: 01/03/23 2013       Color Yellow       Character Clear       Specific Gravity 1.020       Ph 5.5       Glucose Negative mg/dL         Ketones Negative mg/dL         Protein Negative mg/dL         Bilirubin Negative       Urobilinogen, Urine 0.2       Nitrite Negative       Leukocyte Esterase Negative       Occult Blood Small       Micro Urine Req Microscopic     Narrative:       Collected By: [id number] CECI BRANDT     GRAM STAIN [[id number]] Collected: 01/02/23 1545     Order Status: Completed Specimen: Synovial Updated: 01/02/23 1836       Significant Indicator .       Source SYNO       Site Left Shoulder       Gram Stain Result Rare WBCs.  No organisms seen.        FLUID CULTURE W/GRAM STAIN [[id number]]       Order Status: No result Specimen: Body Fluid from Synovial Fluid       Blood Culture [[id number]] Collected: 12/26/22 2202     Order Status: Completed Specimen: Blood from Peripheral Updated: 12/31/22 2300       Significant Indicator NEG       Source BLD       Site PERIPHERAL       Culture Result No growth after 5 days of incubation.     Narrative:       1 of 2 for Blood Culture x 2 sites order. Per Hospital  Policy: Only " "change Specimen Src: to \"Line\" if specified by  physician order.  No site indicated     Blood Culture [[id number]] Collected: 12/26/22 2225     Order Status: Completed Specimen: Blood from Peripheral Updated: 12/31/22 2300       Significant Indicator NEG       Source BLD       Site PERIPHERAL       Culture Result No growth after 5 days of incubation.     Narrative:       2 of 2 blood culture x2  Sites order. Per Hospital Policy:  Only change Specimen Src: to \"Line\" if specified by physician  order.  Left Hand     CULTURE TISSUE W/ GRM STAIN [[id number]]  (Abnormal) Collected: 12/28/22 1351     Order Status: Completed Specimen: Tissue Updated: 12/31/22 1205       Significant Indicator POS       Source TISS       Site Left anterior tibia       Culture Result -       Gram Stain Result Rare WBCs.  No organisms seen.          Culture Result Streptococcus agalactiae (Group B)  Light growth       Narrative:       Surgery Specimen     Anaerobic Culture [[id number]] Collected: 12/28/22 1351     Order Status: Completed Specimen: Tissue Updated: 12/31/22 1205       Significant Indicator NEG       Source TISS       Site Left anterior tibia       Culture Result No Anaerobes isolated.     Narrative:       Surgery Specimen     CULTURE TISSUE W/ GRM STAIN [[id number]]  (Abnormal) Collected: 12/28/22 1350     Order Status: Completed Specimen: Tissue Updated: 12/31/22 1201       Significant Indicator POS       Source TISS       Site Left knee lateral gutter       Culture Result -       Gram Stain Result Many WBCs.  No organisms seen.          Culture Result Streptococcus agalactiae (Group B)  Light growth       Narrative:       Surgery Specimen     Anaerobic Culture [[id number]] Collected: 12/28/22 1350     Order Status: Completed Specimen: Tissue Updated: 12/31/22 1201       Significant Indicator NEG       Source TISS       Site Left knee lateral gutter       Culture Result No Anaerobes isolated.     Narrative:       Surgery Specimen     " CULTURE TISSUE W/ GRM STAIN [[id number]]  (Abnormal) Collected: 12/28/22 1432     Order Status: Completed Specimen: Tissue Updated: 12/31/22 1200       Significant Indicator POS       Source TISS       Site Left femur       Culture Result -       Gram Stain Result Rare WBCs.  No organisms seen.          Culture Result Streptococcus agalactiae (Group B)  Light growth       Narrative:       Surgery Specimen     Anaerobic Culture [[id number]] Collected: 12/28/22 1432     Order Status: Completed Specimen: Tissue Updated: 12/31/22 1200       Significant Indicator NEG       Source TISS       Site Left femur       Culture Result No Anaerobes isolated.     Narrative:       Surgery Specimen     CULTURE TISSUE W/ GRM STAIN [[id number]]  (Abnormal) Collected: 12/28/22 1433     Order Status: Completed Specimen: Tissue Updated: 12/31/22 1159       Significant Indicator POS       Source TISS       Site Left Tibia       Culture Result -       Gram Stain Result Many WBCs.  No organisms seen.          Culture Result Streptococcus agalactiae (Group B)  Light growth       Narrative:       Surgery Specimen     Anaerobic Culture [[id number]] Collected: 12/28/22 1433     Order Status: Completed Specimen: Tissue Updated: 12/31/22 1159       Significant Indicator NEG       Source TISS       Site Left Tibia       Culture Result No Anaerobes isolated.     Narrative:       Surgery Specimen     CULTURE TISSUE W/ GRM STAIN [[id number]]  (Abnormal) Collected: 12/28/22 1349     Order Status: Completed Specimen: Tissue Updated: 12/31/22 1159       Significant Indicator POS       Source TISS       Site Left Knee Medial Gutter       Culture Result -       Gram Stain Result Rare WBCs.  No organisms seen.          Culture Result Streptococcus agalactiae (Group B)  Light growth       Narrative:       Surgery Specimen     Anaerobic Culture [[id number]] Collected: 12/28/22 1349     Order Status: Completed Specimen: Tissue Updated: 12/31/22 1159        Significant Indicator NEG       Source TISS       Site Left Knee Medial Gutter       Culture Result No Anaerobes isolated.     Narrative:       Surgery Specimen     FLUID CULTURE W/GRAM STAIN  Order: [id number]  Status: Final result     Visible to patient: No (inaccessible in MyChart)     Next appt: Today at 11:00 AM in Infectious Diseases (DANYELL Baez)     Specimen Information: Synovial Fluid   0 Result Notes      Component 1 mo ago   Significant Indicator POS Positive (POS)    Source SYNO    Site Left Knee    Culture Result - Abnormal     Gram Stain Result  Abnormal   Many WBCs.   Rare Gram positive cocci.     Culture Result  Abnormal   Streptococcus agalactiae (Group B)   Light growth   This isolate does NOT demonstrate inducible Clindamycin   resistance in vitro.     Resulting Agency M        Susceptibility     Streptococcus agalactiae (group b)     [facility]     Clindamycin <=0.25 mcg/mL Sensitive     Daptomycin <=0.5 mcg/mL Sensitive     Penicillin <=0.03 mcg/mL Sensitive                     Impression/Assessment      1. Type 2 diabetes mellitus without complication, without long-term current use of insulin ([facility])        2. Streptococcus, group b, as the cause of diseases classified elsewhere        3. Streptococcal arthritis of left knee ([facility])        4. Infection of prosthetic knee joint, subsequent encounter        5. History of illicit drug use          April Alicia is a 62 y.o. female with a history of alcohol and cocaine abuse, cirrhosis, asthma, A-fib on apixaban, 2 diabetes, hypertension. Patient was admitted on July 2022 with a group B strep bacteremia and left knee prosthetic joint infection along with a left shoulder septic arthritis and some subdeltoid abscess.  Plan was for her to discharge to a skilled nursing facility and continue IV ceftriaxone for 6 weeks until 8/30/2022 due to some unknown reason patient only received 4 weeks of antibiotics.  Patient was readmitted on  12/26/2022 due to recurrent left knee pain and swelling.  She was taken back to the OR on 12/28 underwent explantation and placement of articulating  spacer, wound VAC.  Multiple OR cultures again growing group B Strep.   Underwent synovial fluid removal from the left shoulder on 1/2/2022 WBC 1051, 40% polys, 40% lymphs, not consistent with infection.  Streptococcus group B (penicillin sensitive). Plan at discharge with 6 weeks of IV antibiotics ceftriaxone 2 g daily till 2/7/2023.     2/15-Orthopedic MD Garcia note recommending patient continue oral antibiotics for at least 1 year prior to reimplantation as she is undergone 2 I&D's in the past with 2 antibiotic spacers which were both failed.  She is at high risk for failing stage II reimplantation and will be subject to amputation if failed again.  We will continue with oral Augmentin 500/ 125 mg twice daily for an additional year.  Patient will need to follow-up with ID clinic in 1 month with repeat CBC/CMP.  Wound education provided to patient and to report to ER    Augmentin   -Side effects include nausea, vomiting, diarrhea, dark-colored urine, jaundice, rash, Jose Guadalupe Lev syndrome, ect  -Increase in INR on anticoagulation  -1 month Labs CBC/CMP liver and renal function     PLAN:   -Patient finished 6-week course of IV ceftriaxone on 2/7/2023  -Patient to start on Augmentin 500 / 125 mg twice daily with an indefinite end date, plan to attempt stage II reimplantation in 1 year per orthopedic note.   -CBC/CMP in  1 month before next visit  -Medication education provided  - Last A1C 5.4 from 1/1/23, diabetes well managed at this time.  Diet education provided  - Smoking sensation education provided  - History of illicit drug use, patient declines current use     Return visit: 1 month. Follow up with primary care physician for chronic medical problems      I have performed a physical exam,  updated ROS and plan today. I have reviewed previous images, labs, and  provider notes.      DANYELL Baez    All Patients should seek medical re-evaluation or report to the ER for new, increasing or worsening symptoms. In some circumstances medical conditions can change from the initial evaluation and may require emergent medical re-evaluation. This includes but is not limited to chest pain, shortness of breath, atypical abdominal pain, atypical headache, ALOC, fever >101, low blood pressure, high respiratory rate (above 30), low oxygen saturation (below 90%), acute delirium, abnormal bleeding, inability to tolerate any intake, weakness on one side of the body, any worsened or concerning conditions.    Please note that this dictation was created using voice recognition software. I have worked with technical experts from [facility] to optimize the interface.  I have made every reasonable attempt to correct obvious errors, but there may be errors of grammar and possibly content that I did not discover before finalizing the note.

## 2023-02-23 ENCOUNTER — TELEPHONE (OUTPATIENT)
Dept: CARDIOLOGY | Facility: MEDICAL CENTER | Age: 63
End: 2023-02-23
Payer: MEDICAID

## 2023-02-23 PROCEDURE — 93268 ECG RECORD/REVIEW: CPT | Performed by: INTERNAL MEDICINE

## 2023-02-23 NOTE — TELEPHONE ENCOUNTER
EOS in media dated today. MR care team unavailable.    To Dr. Erickson - please read and sign EOS. Thank you!  To Kirstie - please review. Thank you!

## 2023-02-28 ENCOUNTER — OFFICE VISIT (OUTPATIENT)
Dept: PHYSICAL MEDICINE AND REHAB | Facility: MEDICAL CENTER | Age: 63
End: 2023-02-28
Payer: MEDICAID

## 2023-02-28 VITALS
BODY MASS INDEX: 25.91 KG/M2 | WEIGHT: 171 LBS | SYSTOLIC BLOOD PRESSURE: 114 MMHG | OXYGEN SATURATION: 95 % | TEMPERATURE: 98.6 F | HEART RATE: 61 BPM | HEIGHT: 68 IN | DIASTOLIC BLOOD PRESSURE: 64 MMHG

## 2023-02-28 DIAGNOSIS — M25.511 RIGHT SHOULDER PAIN, UNSPECIFIED CHRONICITY: ICD-10-CM

## 2023-02-28 DIAGNOSIS — K70.30 ALCOHOLIC CIRRHOSIS OF LIVER WITHOUT ASCITES (HCC): Chronic | ICD-10-CM

## 2023-02-28 DIAGNOSIS — G89.29 CENTRAL SENSITIZATION TO PAIN: ICD-10-CM

## 2023-02-28 DIAGNOSIS — M00.9 PYOGENIC ARTHRITIS OF LEFT SHOULDER REGION, DUE TO UNSPECIFIED ORGANISM (HCC): ICD-10-CM

## 2023-02-28 DIAGNOSIS — M25.511 ACUTE PAIN OF RIGHT SHOULDER DUE TO TRAUMA: Primary | ICD-10-CM

## 2023-02-28 DIAGNOSIS — M00.262 STREPTOCOCCAL ARTHRITIS OF LEFT KNEE (HCC): ICD-10-CM

## 2023-02-28 DIAGNOSIS — G89.11 ACUTE PAIN OF RIGHT SHOULDER DUE TO TRAUMA: Primary | ICD-10-CM

## 2023-02-28 DIAGNOSIS — G89.29 OTHER CHRONIC PAIN: ICD-10-CM

## 2023-02-28 PROCEDURE — 99204 OFFICE O/P NEW MOD 45 MIN: CPT | Performed by: STUDENT IN AN ORGANIZED HEALTH CARE EDUCATION/TRAINING PROGRAM

## 2023-02-28 ASSESSMENT — FIBROSIS 4 INDEX: FIB4 SCORE: 2.828427124746190097

## 2023-02-28 ASSESSMENT — PAIN SCALES - GENERAL: PAINLEVEL: 8=MODERATE-SEVERE PAIN

## 2023-02-28 NOTE — Clinical Note
Please fax copy of note to Karen Reyes PA-C (referring provider-- contact info in referral notes in media tab)

## 2023-02-28 NOTE — PROGRESS NOTES
"New Patient Note    Interventional Pain and Spine  Physiatry (Physical Medicine and Rehabilitation)     Patient Name: April Alicia  : 1960  Date of Service: 2023  PCP: Karen Mcclure P.A.-C.  Referring Provider: Karen Mcclure P.A.-*    Chief Complaint:   Chief Complaint   Patient presents with    New Patient     Knee pain       HPI  HISTORY (2023):  April Alicia is a 62 y.o. female who presents today with pain at her left knee and bilateral shoulders. She notes she is currently being treated for a L TKR infection initially diagnosed 2022 and has hx of septic left shoulder; she is currently on abx, and actively being managed by ID and ortho Dr. Luz. She was referred by Karen Romero PA-C for evaluation and management of diffuse joint pain, neuropathy, and chronic pain syndrome.     She reports bad pain at her right shoulder especially after she fell on to her right shoulder in the past month. She has chronic right shoulder pain which worsened after her fall and is worried she has a right shoulder fracture, since this feels similar to pain at her left shoulder that was ultimately diagnosed as associated with a left shoulder fx. She has not had imaging done of her right shoulder since her fall.    Pain right now is 8/10 on the numeric pain scale. Her pain worsens with any movement and touch. Her pain improves with narcotics. Her pain significantly interferes with ADLs. The patient notes numbness and tingling in her bilateral legs from diabetes and pain from a skin graft at her right anterior leg from \"blood poisoning.\"     Notes she currently is staying at a rehab facility and is currently doing PT for shoulder pain and gait there. She does not know how long she is going to be at the rehab facility. Notes she is not able to ambulate on her own yet and that is one of the stated goals at the rehab facility.     Notes she had a pain management provider in [place] " before, unsure of their name.    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 Pt states she is no longer taking eliquis    Psychological testing for pain as depression and pain commonly coexist and need to both be treated.     Opioid Risk Score: 13    Interpretation of Opioid Risk Score   Score 0-3 = Low risk of abuse. Do UDS at least once per year.  Score 4-7 = Moderate risk of abuse. Do UDS 1-4 times per year.  Score 8+ = High risk of abuse. Refer to specialist.    PHQ      7/19/2022     9:00 PM 7/29/2022     8:00 AM 12/30/2022     8:20 PM   Depression Screen (PHQ-2/PHQ-9)   PHQ-2 Total Score 0 0 0       Interpretation of PHQ-9 Total Score   Score Severity   1-4 No Depression   5-9 Mild Depression   10-14 Moderate Depression   15-19 Moderately Severe Depression   20-27 Severe Depression      Medical records review:  I reviewed the note from the referring provider Karen Mcclure P.A.-* including the note dated 11/21/22.    ROS:   Red Flags ROS:   Fever, Chills, Sweats: Denies  Involuntary Weight Loss: Denies  Bladder Incontinence: Denies  Bowel Incontinence: denies  Saddle Anesthesia: Denies    All other systems reviewed and negative.     PMHx:   Past Medical History:   Diagnosis Date    Arrhythmia     Arthritis     OA in knees    ASTHMA     Blood clotting disorder (HCC) 2018    right leg blood clot    Dental disorder     upper and lower dentures    Diabetes     Emphysema of lung (HCC)     Heart  abnormality     leakage in left valve    Heart burn     left knee    Heart valve disease     Hypertension     Infection 9/4/2017    Infection 2018    Right knee after total knee    Liver disease     Pneumonia 02/2020    Seizure disorder (HCC)        PSHx:   Past Surgical History:   Procedure Laterality Date    PB REVISE KNEE JOINT REPLACE,ALL PARTS Left 12/28/2022    Procedure: REVISION, TOTAL ARTHROPLASTY, KNEE, ALL COMPONENTS-LEFT;  Surgeon: Wild Luz M.D.;  Location: SURGERY [facility];  Service: Orthopedics    IRRIGATION & DEBRIDEMENT GENERAL Left 12/28/2022    Procedure: IRRIGATION AND DEBRIDEMENT, WOUND;  Surgeon: Wild Luz M.D.;  Location: [facility];  Service: Orthopedics    PB REVISE KNEE JOINT REPLACE,ALL PARTS Left 7/19/2022    Procedure: REVISION, TOTAL ARTHROPLASTY, KNEE, ALL COMPONENTS - ANTIBIOTIC SPACER;  Surgeon: Wild Luz M.D.;  Location: [facility];  Service: Orthopedics    IRRIGATION & DEBRIDEMENT GENERAL Left 7/17/2022    Procedure: IRRIGATION AND DEBRIDEMENT, SHOULDER;  Surgeon: Obdulio Gray M.D.;  Location: [facility];  Service: Orthopedics    PB TOTAL KNEE ARTHROPLASTY Left 8/24/2020    Procedure: ARTHROPLASTY, KNEE, TOTAL;  Surgeon: Víctor Faustin M.D.;  Location: [facility];  Service: Orthopedics    KNEE ARTHROPLASTY TOTAL Right 2018    IRRIGATION & DEBRIDEMENT ORTHO Right 9/3/2017    Procedure: IRRIGATION & DEBRIDEMENT ORTHO, POLY EXCHANGE;  Surgeon: Jerome Russell M.D.;  Location: [facility];  Service: Orthopedics    COLONOSCOPY WITH CLIPPING  10/28/2015    Procedure: COLONOSCOPY WITH CLIPPING;  Surgeon: Ruben Colon M.D.;  Location: ENDOSCOPY [facility];  Service:     COLONOSCOPY WITH SCLEROTHERAPY  10/28/2015    Procedure: COLONOSCOPY WITH SCLEROTHERAPY;  Surgeon: Ruben Colon M.D.;  Location: ENDOSCOPY [facility];  Service:     COLONOSCOPY WITH TATTOOING  10/28/2015    Procedure:  COLONOSCOPY WITH TATTOOING;  Surgeon: Ruben Colon M.D.;  Location: ENDOSCOPY [facility];  Service:     GYN SURGERY      hysteroscoopy    GYN SURGERY      tubal ligation       Family Hx:   History reviewed. No pertinent family history.    Social Hx:  Social History     Socioeconomic History    Marital status: Single     Spouse name: Not on file    Number of children: Not on file    Years of education: Not on file    Highest education level: Not on file   Occupational History    Not on file   Tobacco Use    Smoking status: Former     Types: Cigarettes     Quit date: 2016     Years since quittin.1    Smokeless tobacco: Former     Quit date: 2021    Tobacco comments:     a few a day when I can   Vaping Use    Vaping Use: Never used   Substance and Sexual Activity    Alcohol use: No    Drug use: No    Sexual activity: Not on file   Other Topics Concern    Not on file   Social History Narrative    Not on file     Social Determinants of Health     Financial Resource Strain: Not on file   Food Insecurity: Not on file   Transportation Needs: Not on file   Physical Activity: Not on file   Stress: Not on file   Social Connections: Not on file   Intimate Partner Violence: Not on file   Housing Stability: Not on file       Allergies:  Allergies   Allergen Reactions    Nkda [No Known Drug Allergy]        Medications: reviewed on epic.   Outpatient Medications Marked as Taking for the 23 encounter (Office Visit) with Suze Naylor M.D.   Medication Sig Dispense Refill    amoxicillin-clavulanate (AUGMENTIN) 500-125 MG Tab Take 1 Tablet by mouth 2 times a day. 120 Tablet 1    acetaminophen (TYLENOL) 325 MG Tab Take 650 mg by mouth every four hours as needed.      bisacodyl (DULCOLAX) 10 MG Suppos Insert 10 mg into the rectum every day.      furosemide (LASIX) 20 MG Tab Take 1 Tablet by mouth every day. 30 Tablet 0    potassium chloride (MICRO-K) 10 MEQ capsule Take 1 Capsule by mouth every day.  30 Capsule 0    lidocaine-prilocaine (EMLA) 2.5-2.5 % Cream       magnesium oxide (MAG-OX) 400 MG Tab tablet       zinc sulfate (ZINCATE) 220 (50 Zn) MG Cap Take 1 Capsule by mouth every day. 30 Capsule 3    sodium bicarbonate (SODIUM BICARBONATE) 650 MG Tab Take 2 Tablets by mouth 2 times a day. 120 Tablet 3    calcium carbonate 1000 MG Chew Tab Chew 1 Tablet 3 times a day as needed (heartburn). 30 Tablet     vitamin D2, Ergocalciferol, (DRISDOL) 1.25 MG (96722 UT) Cap capsule Take 1 Capsule by mouth every 7 days. 5 Capsule     lactulose 20 GM/30ML Solution Take 15 mL by mouth every day. 450 mL     MULTAQ 400 MG Tab Take 1 Tablet by mouth 2 times a day with meals. 60 Tablet     omeprazole (PRILOSEC) 20 MG delayed-release capsule Take 1 Capsule by mouth every 12 hours. 30 Capsule     atorvastatin (LIPITOR) 20 MG Tab Take 1 Tablet by mouth every evening. 90 Tablet 3    SPIRIVA HANDIHALER 18 MCG Cap Place 1 Capsule into inhaler and inhale every day.      gabapentin (NEURONTIN) 400 MG Cap Take 400 mg by mouth 2 times a day.      albuterol (VENTOLIN OR PROVENTIL) 108 (90 BASE) MCG/ACT Aero Soln inhalation aerosol Inhale 2 Puffs every four hours as needed for Shortness of Breath.          Current Outpatient Medications on File Prior to Visit   Medication Sig Dispense Refill    amoxicillin-clavulanate (AUGMENTIN) 500-125 MG Tab Take 1 Tablet by mouth 2 times a day. 120 Tablet 1    acetaminophen (TYLENOL) 325 MG Tab Take 650 mg by mouth every four hours as needed.      bisacodyl (DULCOLAX) 10 MG Suppos Insert 10 mg into the rectum every day.      furosemide (LASIX) 20 MG Tab Take 1 Tablet by mouth every day. 30 Tablet 0    potassium chloride (MICRO-K) 10 MEQ capsule Take 1 Capsule by mouth every day. 30 Capsule 0    lidocaine-prilocaine (EMLA) 2.5-2.5 % Cream       magnesium oxide (MAG-OX) 400 MG Tab tablet       zinc sulfate (ZINCATE) 220 (50 Zn) MG Cap Take 1 Capsule by mouth every day. 30 Capsule 3    sodium bicarbonate  "(SODIUM BICARBONATE) 650 MG Tab Take 2 Tablets by mouth 2 times a day. 120 Tablet 3    calcium carbonate 1000 MG Chew Tab Chew 1 Tablet 3 times a day as needed (heartburn). 30 Tablet     vitamin D2, Ergocalciferol, (DRISDOL) 1.25 MG (30417 UT) Cap capsule Take 1 Capsule by mouth every 7 days. 5 Capsule     lactulose 20 GM/30ML Solution Take 15 mL by mouth every day. 450 mL     MULTAQ 400 MG Tab Take 1 Tablet by mouth 2 times a day with meals. 60 Tablet     omeprazole (PRILOSEC) 20 MG delayed-release capsule Take 1 Capsule by mouth every 12 hours. 30 Capsule     atorvastatin (LIPITOR) 20 MG Tab Take 1 Tablet by mouth every evening. 90 Tablet 3    SPIRIVA HANDIHALER 18 MCG Cap Place 1 Capsule into inhaler and inhale every day.      gabapentin (NEURONTIN) 400 MG Cap Take 400 mg by mouth 2 times a day.      albuterol (VENTOLIN OR PROVENTIL) 108 (90 BASE) MCG/ACT Aero Soln inhalation aerosol Inhale 2 Puffs every four hours as needed for Shortness of Breath.      cetirizine (ZYRTEC) 10 MG Tab  (Patient not taking: Reported on 2/16/2023)      ferrous sulfate 325 (65 Fe) MG tablet Take  by mouth. (Patient not taking: Reported on 2/16/2023)      Skin Protectants, Misc. (MINERIN CREME) Cream  (Patient not taking: Reported on 2/16/2023)       No current facility-administered medications on file prior to visit.         EXAMINATION     Physical Exam:   /64 (BP Location: Right arm, Patient Position: Sitting, BP Cuff Size: Adult)   Pulse 61   Temp 37 °C (98.6 °F) (Temporal)   Ht 1.715 m (5' 7.5\")   Wt 77.6 kg (171 lb)   SpO2 95%     Constitutional:   Body Habitus: Body mass index is 26.39 kg/m².  Cooperation: Fully cooperates with exam  Appearance: Well-groomed, well-nourished.    Eyes: No scleral icterus to suggest severe liver disease, no proptosis to suggest severe hyperthyroidism    ENT -no obvious auditory deficits, no noticeable facial droop . Hard of hearing    Skin -no rashes or lesions noted     Respiratory-  " breathing comfortably on room air, no audible wheezing    Cardiovascular-distal extremities warm and well perfused.  No lower extremity edema is noted.     Gastrointestinal - no obvious abdominal masses, non-distended    Psychiatric- alert and oriented ×3. Normal affect.     Gait - not formally assessed, pt uses WC for mobility    Musculoskeletal and Neuro -     Cervical spine   Inspection: No deformities of the skin over the cervical spine. No rashes or lesions.    Limited AROM of bilateral shoulders with FF, SA, IR, and ER, more limited on left. Tenderness to palpation diffusely over bilateral shoulders including AC joint, GH joint, upper trapezius, deltoid. Difficult to assess for focal tenderness to palpation as patient is diffusely tender to touch with hypersensitivity throughout her shoulders, arms, upper back, thighs, and legs.    Key points for the international standards for neurological classification of spinal cord injury (ISNCSCI) to light touch.     Dermatome R L   C4 2 2   C5 2 2   C6 2 2   C7 2 2   C8 2 2   T1 2 2   T2 2 2       Motor Exam Upper Extremities   ? Myotome R L   Shoulder abduction C5 4 4   Elbow flexion C5 4 4   Wrist extension C6 4+ 4+   Elbow extension C7 4 4   Finger flexion C8 4+ 4+   Finger abduction T1 4+ 4+       MEDICAL DECISION MAKING    Medical records review: see under HPI section.     DATA    Labs: Personally reviewed at today's visit:     Lab Results   Component Value Date/Time    SODIUM 133 (L) 01/08/2023 12:49 AM    POTASSIUM 4.2 01/08/2023 12:49 AM    CHLORIDE 106 01/08/2023 12:49 AM    CO2 18 (L) 01/08/2023 12:49 AM    ANION 8.0 01/05/2023 05:18 AM    GLUCOSE 140 (H) 01/08/2023 12:49 AM    BUN 11 01/08/2023 12:49 AM    CREATININE 0.65 01/08/2023 12:49 AM    CREATININE 0.9 03/12/2009 06:50 PM    CALCIUM 7.9 (L) 01/08/2023 12:49 AM    ASTSGOT 16 01/05/2023 05:18 AM    ALTSGPT 8 01/05/2023 05:18 AM    TBILIRUBIN 0.5 01/05/2023 05:18 AM    ALBUMIN 2.3 (L) 01/08/2023 12:49 AM     TOTPROTEIN 5.6 (L) 01/05/2023 05:18 AM    GLOBULIN 3.4 01/05/2023 05:18 AM    AGRATIO 0.6 01/05/2023 05:18 AM       Lab Results   Component Value Date/Time    PROTHROMBTM 24.3 (H) 07/28/2022 06:54 AM    INR 2.25 (H) 07/28/2022 06:54 AM        Lab Results   Component Value Date/Time    WBC 4.9 01/08/2023 12:49 AM    RBC 2.70 (L) 01/08/2023 12:49 AM    HEMOGLOBIN 7.8 (L) 01/08/2023 12:49 AM    HEMATOCRIT 24.7 (L) 01/08/2023 12:49 AM    MCV 91.5 01/08/2023 12:49 AM    MCH 28.9 01/08/2023 12:49 AM    MCHC 31.6 (L) 01/08/2023 12:49 AM    MPV 10.8 01/08/2023 12:49 AM    NEUTSPOLYS 68.10 01/08/2023 12:49 AM    LYMPHOCYTES 20.50 (L) 01/08/2023 12:49 AM    MONOCYTES 7.60 01/08/2023 12:49 AM    EOSINOPHILS 2.00 01/08/2023 12:49 AM    EOSINOPHILS 2 01/02/2023 03:45 PM    BASOPHILS 1.20 01/08/2023 12:49 AM    HYPOCHROMIA 1+ 12/26/2022 10:02 PM    ANISOCYTOSIS 1+ 12/28/2022 07:47 AM        Lab Results   Component Value Date/Time    HBA1C 5.4 01/01/2023 03:23 AM        Imaging:   I personally reviewed following images, these are my reads  XR R shoulder 12/28/22  Moderate OA of AC joint. Mild OA of GH joint. No acute fracture. See formal radiology report for further details.        IMAGING radiology reads. I reviewed the following radiology reads                                                         Results for orders placed during the hospital encounter of 12/26/22    DX-KNEE 2- LEFT    Impression  Antibiotic impregnated radiopaque beads within and about the knee arthroplasty.   Results for orders placed during the hospital encounter of 12/26/22    DX-KNEE 3 VIEWS LEFT    Impression  1.  Revised left knee arthroplasty is unchanged since prior. No evidence of hardware complication.  2.  No acute fracture or dislocation.  3.  Marked soft tissue swelling and suspected knee effusion.             Results for orders placed during the hospital encounter of 12/26/22    DX-SHOULDER 2+ RIGHT    Impression  1.  Moderately advanced  acromioclavicular degenerative changes.  2.  Acromiohumeral narrowing suggesting chronic rotator cuff tears.  3.  No acute fracture or dislocation.           MRI left shoulder 7/2022  FINDINGS: The study is limited by patient motion artifact.     Osseous acromial outlet: The acromion demonstrates a curved undersurface. There is no lateral downsloping.  There is no evidence of an inferiorly directed subacromial enthesophyte.  The acromioclavicular joint is widened with fragmentation of the distal clavicle possibly related to prior surgery or trauma.  There is a large amount of fluid seen within the subacromial/subdeltoid bursa. Large joint effusion extends into the subacromial/subdeltoid bursa. There are pockets of low signal gas and the subdeltoid bursa. There is synovitis within the bursa.     Rotator cuff: There is complete full-thickness tear of the supraspinatus tendon with retraction to the bony glenoid. There is prominent muscle atrophy and edema. There is partial thickness tear of the articular surface fibers of the infraspinatus with   prominent muscle atrophy and edema. There is full-thickness tear of some of the fibers of the subscapularis tendon. There is prominent muscle edema and atrophy.     Glenoid labrum: There is diffuse tearing of the glenoid labrum include the biceps anchor.     Osseous structures/Cartilaginous surfaces: There is cartilage loss involving the glenohumeral joint with osteophytic spurring. There is mild marrow edema involving the humeral head and edema involving the bony glenoid posteriorly. There is some posterior   subluxation of the humeral head with respect to the bony glenoid.     Miscellaneous: There is a large joint effusion with synovitis. Fluid is again noted to extend into the subacromial/subdeltoid bursa where there is gas within the bursa. The long head of the biceps tendon is dislocated medially from the bicipital groove   and is diffusely thickened. There is extensive  edema/induration of all of the soft tissues surrounding the shoulder. This extends into the axilla and proximal humerus.     IMPRESSION:     1.  Large joint effusion with a large amount of fluid seen extending into the subacromial/subdeltoid bursa with synovitis and small low signal foci within the bursa consistent with pockets of gas. These findings are very suspicious for septic arthritis   and septic bursitis.     2.  Complete full-thickness tear of the supraspinatus tendon with retraction to the bony glenoid.     3.  Full-thickness tear portion of the subscapularis tendon.     4.  Partial-thickness tear of the articular surface fibers of the infraspinatus tendon.     5.  Prominent muscle edema and atrophy involving the rotator cuff as well as all the muscles surrounding the shoulder which may represent myositis.     6.  Osteoarthritis of the glenohumeral joint.     7.  Diffuse tearing of the glenoid labrum.     8.  Subluxation of the long head of the biceps tendon medially from the bicipital groove with marked thinning of the intra-articular portion consistent with tendinosis.     9.  Extensive soft tissue edema to include fat and muscle involving all of the imaged structures around the shoulder likely representing cellulitis.     10.  Widening of the acromioclavicular joint with fragmentation of the distal clavicle consistent with old posttraumatic change versus surgical change.    Diagnosis  Visit Diagnoses     ICD-10-CM   1. Acute pain of right shoulder due to trauma  M25.511    G89.11   2. Other chronic pain  G89.29   3. 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 Alcoholic cirrhosis of liver without ascites ([facility])  K70.30   5. Pyogenic arthritis of left shoulder region, due to unspecified organism ([facility])  M00.9   6. Streptococcal arthritis of left knee ([facility])  M00.262   7. Right shoulder pain, acute on chronic  M25.511         ASSESSMENT AND PLAN:  April Alicia ( 1960) is a female presenting  with left shoulder pain and left knee pain with history of left septic shoulder and infected left TKR at these areas currently being managed by infectious disease and on antibiotics.  Infected left TKR is also being managed by Ortho.  Also with acute on chronic right shoulder pain with imaging findings of osteoarthritis.  She notes worsened right shoulder pain after a recent fall.    On exam it is difficult to assess for focal tenderness to palpation at her right shoulder as patient is diffusely tender to touch with hypersensitivity throughout her shoulders, arms, upper back, thighs, and legs.      April was seen today for new patient.    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    Diagnostic workup: as above to assess for acute fracture.  Patient notes her right shoulder pain feels similar to left shoulder pain that started after a fall and a XR showed a fracture at that time.  - Personally reviewed at today's visit:  XR R shoulder 12/28/22    Medications:   - discussed that I would recommend increasing gabapentin to 400mg TID for centralized component of pain. Currently taking 400mg BID for symptoms of neuropathy. Discussed that I will refer her back to her PCP for this as today's consult is intended for consideration of interventions.   - can consider NSAIDs or narcotics pending PCP discretion regarding risk/benefit profile.   - avoid tylenol due to cirrhosis    Interventions:   -Discussed that at this  time I believe it would be in her best interest to defer any injections for safety reasons as she is currently on antibiotics and being treated for infection.  Discussed that after her infection has resolved, I would consider performing injections of her right shoulder.    Other  - f/u with ID and ortho re: septic left shoulder and injected L TKA    Follow-up: as needed for injections -- pt to contact me when she is cleared of infection and would like to consider injections    Orders Placed This Encounter    DX-SHOULDER 2+ RIGHT       Suze Naylor MD  Interventional Pain and Spine  Physical Medicine and Rehabilitation  [facility]    CC Karen Mcclure, P.A.-*     The above note documents my personal evaluation of this patient. In addition, I have reviewed and confirmed with the patient and MA the supportive information documented in today's Patient Health Questionnaire and Office Note.     Please note that this dictation was created using voice recognition software. I have made every reasonable attempt to correct obvious errors, but I expect that there are errors of grammar and possibly content that I did not discover before finalizing the note.

## 2023-03-08 ENCOUNTER — TELEPHONE (OUTPATIENT)
Dept: CARDIOLOGY | Facility: MEDICAL CENTER | Age: 63
End: 2023-03-08
Payer: MEDICAID

## 2023-03-09 NOTE — TELEPHONE ENCOUNTER
Discussed 30-day Biotel monitor results with patient over the phone.  She does have frequent PACs, greater than 7% no atrial fibrillation was noted.  We can discuss the results further at her appointment next month

## 2023-03-16 ENCOUNTER — HOSPITAL ENCOUNTER (OUTPATIENT)
Dept: LAB | Facility: MEDICAL CENTER | Age: 63
End: 2023-03-16
Attending: NURSE PRACTITIONER
Payer: MEDICAID

## 2023-03-16 ENCOUNTER — OFFICE VISIT (OUTPATIENT)
Dept: INFECTIOUS DISEASES | Facility: MEDICAL CENTER | Age: 63
End: 2023-03-16
Attending: NURSE PRACTITIONER
Payer: MEDICAID

## 2023-03-16 VITALS
HEART RATE: 54 BPM | WEIGHT: 181 LBS | RESPIRATION RATE: 16 BRPM | DIASTOLIC BLOOD PRESSURE: 60 MMHG | SYSTOLIC BLOOD PRESSURE: 130 MMHG | BODY MASS INDEX: 27.43 KG/M2 | HEIGHT: 68 IN | OXYGEN SATURATION: 97 %

## 2023-03-16 DIAGNOSIS — M00.262 STREPTOCOCCAL ARTHRITIS OF LEFT KNEE (HCC): ICD-10-CM

## 2023-03-16 DIAGNOSIS — E11.9 TYPE 2 DIABETES MELLITUS WITHOUT COMPLICATION, WITHOUT LONG-TERM CURRENT USE OF INSULIN (HCC): ICD-10-CM

## 2023-03-16 DIAGNOSIS — T84.59XD INFECTION OF PROSTHETIC KNEE JOINT, SUBSEQUENT ENCOUNTER: ICD-10-CM

## 2023-03-16 DIAGNOSIS — F19.91 HISTORY OF ILLICIT DRUG USE: ICD-10-CM

## 2023-03-16 DIAGNOSIS — Z96.659 INFECTION OF PROSTHETIC KNEE JOINT, SUBSEQUENT ENCOUNTER: ICD-10-CM

## 2023-03-16 LAB
ALBUMIN SERPL BCP-MCNC: 2.9 G/DL (ref 3.2–4.9)
ALBUMIN/GLOB SERPL: 0.8 G/DL
ALP SERPL-CCNC: 185 U/L (ref 30–99)
ALT SERPL-CCNC: 18 U/L (ref 2–50)
ANION GAP SERPL CALC-SCNC: 8 MMOL/L (ref 7–16)
AST SERPL-CCNC: 35 U/L (ref 12–45)
BASOPHILS # BLD AUTO: 1.3 % (ref 0–1.8)
BASOPHILS # BLD: 0.04 K/UL (ref 0–0.12)
BILIRUB SERPL-MCNC: 0.7 MG/DL (ref 0.1–1.5)
BUN SERPL-MCNC: 16 MG/DL (ref 8–22)
CALCIUM ALBUM COR SERPL-MCNC: 9.3 MG/DL (ref 8.5–10.5)
CALCIUM SERPL-MCNC: 8.4 MG/DL (ref 8.5–10.5)
CHLORIDE SERPL-SCNC: 114 MMOL/L (ref 96–112)
CO2 SERPL-SCNC: 22 MMOL/L (ref 20–33)
CREAT SERPL-MCNC: 0.76 MG/DL (ref 0.5–1.4)
EOSINOPHIL # BLD AUTO: 0.15 K/UL (ref 0–0.51)
EOSINOPHIL NFR BLD: 5 % (ref 0–6.9)
ERYTHROCYTE [DISTWIDTH] IN BLOOD BY AUTOMATED COUNT: 60.6 FL (ref 35.9–50)
GFR SERPLBLD CREATININE-BSD FMLA CKD-EPI: 88 ML/MIN/1.73 M 2
GLOBULIN SER CALC-MCNC: 3.8 G/DL (ref 1.9–3.5)
GLUCOSE SERPL-MCNC: 205 MG/DL (ref 65–99)
HCT VFR BLD AUTO: 34.4 % (ref 37–47)
HGB BLD-MCNC: 10.5 G/DL (ref 12–16)
IMM GRANULOCYTES # BLD AUTO: 0.01 K/UL (ref 0–0.11)
IMM GRANULOCYTES NFR BLD AUTO: 0.3 % (ref 0–0.9)
LYMPHOCYTES # BLD AUTO: 0.78 K/UL (ref 1–4.8)
LYMPHOCYTES NFR BLD: 26.1 % (ref 22–41)
MCH RBC QN AUTO: 27.4 PG (ref 27–33)
MCHC RBC AUTO-ENTMCNC: 30.5 G/DL (ref 33.6–35)
MCV RBC AUTO: 89.8 FL (ref 81.4–97.8)
MONOCYTES # BLD AUTO: 0.29 K/UL (ref 0–0.85)
MONOCYTES NFR BLD AUTO: 9.7 % (ref 0–13.4)
NEUTROPHILS # BLD AUTO: 1.72 K/UL (ref 2–7.15)
NEUTROPHILS NFR BLD: 57.6 % (ref 44–72)
NRBC # BLD AUTO: 0 K/UL
NRBC BLD-RTO: 0 /100 WBC
PLATELET # BLD AUTO: 82 K/UL (ref 164–446)
PMV BLD AUTO: 10.6 FL (ref 9–12.9)
POTASSIUM SERPL-SCNC: 4.7 MMOL/L (ref 3.6–5.5)
PROT SERPL-MCNC: 6.7 G/DL (ref 6–8.2)
RBC # BLD AUTO: 3.83 M/UL (ref 4.2–5.4)
SODIUM SERPL-SCNC: 144 MMOL/L (ref 135–145)
WBC # BLD AUTO: 3 K/UL (ref 4.8–10.8)

## 2023-03-16 PROCEDURE — 99213 OFFICE O/P EST LOW 20 MIN: CPT | Performed by: NURSE PRACTITIONER

## 2023-03-16 PROCEDURE — 80053 COMPREHEN METABOLIC PANEL: CPT

## 2023-03-16 PROCEDURE — 36415 COLL VENOUS BLD VENIPUNCTURE: CPT

## 2023-03-16 PROCEDURE — 85025 COMPLETE CBC W/AUTO DIFF WBC: CPT

## 2023-03-16 PROCEDURE — 99212 OFFICE O/P EST SF 10 MIN: CPT | Performed by: NURSE PRACTITIONER

## 2023-03-16 RX ORDER — AMOXICILLIN AND CLAVULANATE POTASSIUM 500; 125 MG/1; MG/1
1 TABLET, FILM COATED ORAL 2 TIMES DAILY
Qty: 90 TABLET | Refills: 1 | Status: ON HOLD | OUTPATIENT
Start: 2023-03-16 | End: 2023-04-22

## 2023-03-16 ASSESSMENT — ENCOUNTER SYMPTOMS
SHORTNESS OF BREATH: 0
CHILLS: 0
VOMITING: 0
DIZZINESS: 0
ABDOMINAL PAIN: 0
PALPITATIONS: 0
HEADACHES: 0
FOCAL WEAKNESS: 0
NAUSEA: 0
DOUBLE VISION: 0
COUGH: 0
MYALGIAS: 0
NERVOUS/ANXIOUS: 0
FEVER: 0
BLURRED VISION: 0
BRUISES/BLEEDS EASILY: 0
WHEEZING: 0
SPUTUM PRODUCTION: 0
WEIGHT LOSS: 0

## 2023-03-16 ASSESSMENT — FIBROSIS 4 INDEX: FIB4 SCORE: 2.828427124746190097

## 2023-03-16 NOTE — PROGRESS NOTES
INFECTIOUS  DISEASE  OUTPATIENT CLINIC  NOTE   Subjective   Primary care provider: Karen Mcclure P.A.-C..     Reason for Follow Up:   Follow-up for   1. Type 2 diabetes mellitus without complication, without long-term current use of insulin (HCC)        2. Streptococcal arthritis of left knee (HCC)        3. Infection of prosthetic knee joint, subsequent encounter  amoxicillin-clavulanate (AUGMENTIN) 500-125 MG Tab      4. History of illicit drug use            HPI: Patient previously seen and treated by ID team as inpatient during hospital admission.   April Alicia is a 62 y.o. female with a history of alcohol and cocaine abuse, cirrhosis, asthma, A-fib on apixaban, 2 diabetes, hypertension. Patient was admitted on July 2022 with a group B strep bacteremia and left knee prosthetic joint infection along with a left shoulder septic arthritis and some subdeltoid abscess.  Plan was for her to discharge to a skilled nursing facility and continue IV ceftriaxone for 6 weeks until 8/30/2022 due to some unknown reason patient only received 4 weeks of antibiotics.  Patient was readmitted on 12/26/2022 due to recurrent left knee pain and swelling.  She was taken back to the OR on 12/28 underwent explantation and placement of articulating spacer, wound VAC.  Multiple OR cultures again growing group B Strep.   Underwent synovial fluid removal from the left shoulder on 1/2/2022 WBC 1051, 40% polys, 40% lymphs, not consistent with infection.  Plan at discharge with 6 weeks of IV antibiotics ceftriaxone 2 g daily till 2/7/2023    2/15-Orthopedic MD Garcia note recommending patient continue oral antibiotics for at least 1 year prior to reimplantation as she is undergone 2 I&D's in the past with 2 antibiotic spacers which were both failed.  She is at high risk for failing stage II reimplantation and will be subject to amputation if failed again.  We will continue with oral Augmentin 500/ 125 mg twice daily for an  additional year    02/16/23- Today Patient reports feeling well. Pt stating that the wound is healing well. Pt being followed by the [facility]. Wound pictures reviewed in EPIC. Denies drainage, pungent odor, redness, pain. Denies feeling generally ill, fevers/chills, general malaise, headache, n/v/d.     3/15/23- Today Patient reports feeling  good. Denies feeling generally ill, fevers/chills, general malaise, headache, n/v/d.  Patient states that she has not taken her Augmentin for the last 3 days as the care facility she was discharged from did not give her a supply.  Medication reordered and to be picked up at her pharmacy.  Tolerating the Augmentin with no complaints of side effects.       Current Antimicrobials: Augmentin 500/125 twice daily  Previous Antimicrobials: Unasyn, ceftriaxone, Ancef, cefazolin    Other Current Medications:  Home Medications    Medication Sig Taking? Last Dose Authorizing Provider   amoxicillin-clavulanate (AUGMENTIN) 500-125 MG Tab Take 1 Tablet by mouth 2 times a day. Yes  Mateus Shell A.P.R.N.   acetaminophen (TYLENOL) 325 MG Tab Take 650 mg by mouth every four hours as needed. Yes Taking Physician Outpatient   bisacodyl (DULCOLAX) 10 MG Suppos Insert 10 mg into the rectum every day. Yes Taking Physician Outpatient   furosemide (LASIX) 20 MG Tab Take 1 Tablet by mouth every day. Yes Taking Kirstie Whipple, A.P.R.N.   potassium chloride (MICRO-K) 10 MEQ capsule Take 1 Capsule by mouth every day. Yes Taking Kirstie Whipple, A.P.R.N.   cetirizine (ZYRTEC) 10 MG Tab  Yes Taking Physician Outpatient   ferrous sulfate 325 (65 Fe) MG tablet Take  by mouth. Yes Taking Physician Outpatient   lidocaine-prilocaine (EMLA) 2.5-2.5 % Cream  Yes Taking Physician Outpatient   magnesium oxide (MAG-OX) 400 MG Tab tablet  Yes Taking Physician Outpatient   Skin Protectants, Misc. (MINERIN CREME) Cream  Yes Taking Physician Outpatient   zinc sulfate (ZINCATE) 220 (50 Zn) MG Cap Take 1 Capsule  by mouth every day. Yes Taking Rhys Martínez D.O.   sodium bicarbonate (SODIUM BICARBONATE) 650 MG Tab Take 2 Tablets by mouth 2 times a day. Yes Taking Rhys Martínez D.O.   calcium carbonate 1000 MG Chew Tab Chew 1 Tablet 3 times a day as needed (heartburn). Yes Taking Rhys Martínez D.O.   vitamin D2, Ergocalciferol, (DRISDOL) 1.25 MG (14056 UT) Cap capsule Take 1 Capsule by mouth every 7 days. Yes Taking Rhys Martínez D.O.   lactulose 20 GM/30ML Solution Take 15 mL by mouth every day. Yes Taking Rhys Martínez D.O.   MULTAQ 400 MG Tab Take 1 Tablet by mouth 2 times a day with meals. Yes Taking Rhys Martínez D.O.   omeprazole (PRILOSEC) 20 MG delayed-release capsule Take 1 Capsule by mouth every 12 hours. Yes Taking Rhys Martínez D.O.   atorvastatin (LIPITOR) 20 MG Tab Take 1 Tablet by mouth every evening. Yes Taking Rhys Martínez D.O.   SPIRIVA HANDIHALER 18 MCG Cap Place 1 Capsule into inhaler and inhale every day. Yes Taking Physician Outpatient   gabapentin (NEURONTIN) 400 MG Cap Take 400 mg by mouth 2 times a day. Yes Taking Physician Outpatient   albuterol (VENTOLIN OR PROVENTIL) 108 (90 BASE) MCG/ACT Aero Soln inhalation aerosol Inhale 2 Puffs every four hours as needed for Shortness of Breath. Yes Taking Physician Outpatient        PMH:  Past Medical History:   Diagnosis Date    Arrhythmia     Arthritis     OA in knees    ASTHMA     Blood clotting disorder (HCC) 2018    right leg blood clot    Dental disorder     upper and lower dentures    Diabetes     Emphysema of lung (HCC)     Heart abnormality     leakage in left valve    Heart burn     left knee    Heart valve disease     Hypertension     Infection 9/4/2017    Infection 2018    Right knee after total knee    Liver disease     Pneumonia 02/2020    Seizure disorder (HCC)      Past Surgical History:   Procedure Laterality Date    PB REVISE KNEE JOINT REPLACE,ALL PARTS Left 12/28/2022    Procedure: REVISION, TOTAL ARTHROPLASTY, KNEE, ALL  COMPONENTS-LEFT;  Surgeon: Wild Luz M.D.;  Location: SURGERY [facility];  Service: Orthopedics    IRRIGATION & DEBRIDEMENT GENERAL Left 12/28/2022    Procedure: IRRIGATION AND DEBRIDEMENT, WOUND;  Surgeon: Wild Luz M.D.;  Location: SURGERY [facility];  Service: Orthopedics    PB REVISE KNEE JOINT REPLACE,ALL PARTS Left 7/19/2022    Procedure: REVISION, TOTAL ARTHROPLASTY, KNEE, ALL COMPONENTS - ANTIBIOTIC SPACER;  Surgeon: Wild Luz M.D.;  Location: SURGERY [facility];  Service: Orthopedics    IRRIGATION & DEBRIDEMENT GENERAL Left 7/17/2022    Procedure: IRRIGATION AND DEBRIDEMENT, SHOULDER;  Surgeon: Obdulio Gray M.D.;  Location: SURGERY [facility];  Service: Orthopedics    PB TOTAL KNEE ARTHROPLASTY Left 8/24/2020    Procedure: ARTHROPLASTY, KNEE, TOTAL;  Surgeon: Víctor Faustin M.D.;  Location: [facility];  Service: Orthopedics    KNEE ARTHROPLASTY TOTAL Right 2018    IRRIGATION & DEBRIDEMENT ORTHO Right 9/3/2017    Procedure: IRRIGATION & DEBRIDEMENT ORTHO, POLY EXCHANGE;  Surgeon: Jerome Russell M.D.;  Location: SURGERY [facility];  Service: Orthopedics    COLONOSCOPY WITH CLIPPING  10/28/2015    Procedure: COLONOSCOPY WITH CLIPPING;  Surgeon: Ruben Colon M.D.;  Location: [facility];  Service:     COLONOSCOPY WITH SCLEROTHERAPY  10/28/2015    Procedure: COLONOSCOPY WITH SCLEROTHERAPY;  Surgeon: Ruben Colon M.D.;  Location: [facility];  Service:     COLONOSCOPY WITH TATTOOING  10/28/2015    Procedure: COLONOSCOPY WITH TATTOOING;  Surgeon: Ruben Colon M.D.;  Location: ENDOSCOPY [facility];  Service:     GYN SURGERY  2003    hysteroscoopy    GYN SURGERY  1982    tubal ligation     History reviewed. No pertinent family history.  Social History     Socioeconomic History    Marital status: Single     Spouse name: Not on file    Number of children: Not on file    Years of education: Not on file    Highest  education level: Not on file   Occupational History    Not on file   Tobacco Use    Smoking status: Former     Types: Cigarettes     Quit date: 2016     Years since quittin.2    Smokeless tobacco: Former     Quit date: 2021    Tobacco comments:     a few a day when I can   Vaping Use    Vaping Use: Never used   Substance and Sexual Activity    Alcohol use: No    Drug use: No    Sexual activity: Not on file   Other Topics Concern    Not on file   Social History Narrative    Not on file     Social Determinants of Health     Financial Resource Strain: Not on file   Food Insecurity: Not on file   Transportation Needs: Not on file   Physical Activity: Not on file   Stress: Not on file   Social Connections: Not on file   Intimate Partner Violence: Not on file   Housing Stability: Not on file           Allergies/Intolerances:  Allergies   Allergen Reactions    Nkda [No Known Drug Allergy]        ROS:   Review of Systems   Constitutional:  Negative for chills, fever, malaise/fatigue and weight loss.   HENT:  Negative for congestion and hearing loss.    Eyes:  Negative for blurred vision and double vision.   Respiratory:  Negative for cough, sputum production, shortness of breath and wheezing.    Cardiovascular:  Negative for chest pain and palpitations.   Gastrointestinal:  Negative for abdominal pain, nausea and vomiting.   Genitourinary:  Negative for dysuria.   Musculoskeletal:  Positive for joint pain (Chronic left shoulder pain). Negative for myalgias.   Skin:  Negative for itching and rash.   Neurological:  Negative for dizziness, focal weakness and headaches.   Endo/Heme/Allergies:  Does not bruise/bleed easily.   Psychiatric/Behavioral:  The patient is not nervous/anxious.     ROS was reviewed and were negative except as above.    Objective    Most Recent Vital Signs:  Blood Pressure 130/60 (BP Location: Right arm, Patient Position: Sitting, BP Cuff Size: Adult)   Pulse (Abnormal) 54   Respiration 16   " Height 1.715 m (5' 7.5\")   Weight 82.1 kg (181 lb)   Last Menstrual Period 06/02/2008   Oxygen Saturation 97%   Body Mass Index 27.93 kg/m²     Physical Exam:  Physical Exam  Vitals and nursing note reviewed.   Constitutional:       General: She is not in acute distress.     Appearance: She is not ill-appearing.      Comments: Walker   HENT:      Head: Normocephalic and atraumatic.      Nose: Nose normal.      Mouth/Throat:      Mouth: Mucous membranes are moist.   Eyes:      Pupils: Pupils are equal, round, and reactive to light.   Cardiovascular:      Rate and Rhythm: Normal rate and regular rhythm.   Pulmonary:      Effort: Pulmonary effort is normal. No respiratory distress.      Breath sounds: Normal breath sounds. No stridor.   Abdominal:      General: There is no distension.      Palpations: Abdomen is soft.   Musculoskeletal:         General: Normal range of motion.      Cervical back: Normal range of motion.      Comments:  Left knee surgical scar, no erythema, swelling, or warmth on palpation   Skin:     General: Skin is warm and dry.      Coloration: Skin is not jaundiced or pale.   Neurological:      General: No focal deficit present.      Mental Status: She is alert and oriented to person, place, and time.      Cranial Nerves: No cranial nerve deficit.      Sensory: No sensory deficit.        Pertinent Lab/Imaging Results:  [unfilled]  @CMP@  WBC   Date/Time Value Ref Range Status   01/08/2023 12:49 AM 4.9 4.8 - 10.8 K/uL Final     RBC   Date/Time Value Ref Range Status   01/08/2023 12:49 AM 2.70 (L) 4.20 - 5.40 M/uL Final     Hemoglobin   Date/Time Value Ref Range Status   01/08/2023 12:49 AM 7.8 (L) 12.0 - 16.0 g/dL Final     Hematocrit   Date/Time Value Ref Range Status   01/08/2023 12:49 AM 24.7 (L) 37.0 - 47.0 % Final     MCV   Date/Time Value Ref Range Status   01/08/2023 12:49 AM 91.5 81.4 - 97.8 fL Final     MCH   Date/Time Value Ref Range Status   01/08/2023 12:49 AM 28.9 27.0 - 33.0 pg " Final     MCHC   Date/Time Value Ref Range Status   01/08/2023 12:49 AM 31.6 (L) 33.6 - 35.0 g/dL Final     MPV   Date/Time Value Ref Range Status   01/08/2023 12:49 AM 10.8 9.0 - 12.9 fL Final      Sodium   Date/Time Value Ref Range Status   01/08/2023 12:49  (L) 135 - 145 mmol/L Final     Potassium   Date/Time Value Ref Range Status   01/08/2023 12:49 AM 4.2 3.6 - 5.5 mmol/L Final     Chloride   Date/Time Value Ref Range Status   01/08/2023 12:49  96 - 112 mmol/L Final     Co2   Date/Time Value Ref Range Status   01/08/2023 12:49 AM 18 (L) 20 - 33 mmol/L Final     Glucose   Date/Time Value Ref Range Status   01/08/2023 12:49  (H) 65 - 99 mg/dL Final     Bun   Date/Time Value Ref Range Status   01/08/2023 12:49 AM 11 8 - 22 mg/dL Final     Creatinine   Date/Time Value Ref Range Status   01/08/2023 12:49 AM 0.65 0.50 - 1.40 mg/dL Final   03/12/2009 06:50 PM 0.9 0.5 - 1.4 mg/dL Final     Bun-Creatinine Ratio   Date/Time Value Ref Range Status   03/08/2022 10:02 PM 12.0  Final     Alkaline Phosphatase   Date/Time Value Ref Range Status   01/05/2023 05:18  (H) 30 - 99 U/L Final     AST(SGOT)   Date/Time Value Ref Range Status   01/05/2023 05:18 AM 16 12 - 45 U/L Final     ALT(SGPT)   Date/Time Value Ref Range Status   01/05/2023 05:18 AM 8 2 - 50 U/L Final     Total Bilirubin   Date/Time Value Ref Range Status   01/05/2023 05:18 AM 0.5 0.1 - 1.5 mg/dL Final      CPK Total   Date/Time Value Ref Range Status   08/04/2022 03:17  (H) 0 - 154 U/L Final          Blood Culture   Date Value Ref Range Status   09/04/2017 No growth after 5 days of incubation.  Final     Blood Culture Hold   Date Value Ref Range Status   10/03/2020 Collected  Final       Blood Culture   Date Value Ref Range Status   09/04/2017 No growth after 5 days of incubation.  Final     Blood Culture Hold   Date Value Ref Range Status   10/03/2020 Collected  Final    Micro:  Results         Procedure Component Value Units  Date/Time     COV-2, FLU A/B, AND RSV BY PCR (2-4 HOURS CEPHEID): Collect NP swab in VTM [[id number]] Collected: 01/05/23 1531     Order Status: Completed Specimen: Respirate from Nasopharyngeal Updated: 01/05/23 1629       Influenza virus A RNA Negative       Influenza virus B, PCR Negative       RSV, PCR Negative       SARS-CoV-2 by PCR NotDetected       Comment: [facility] providers: PLEASE REFER TO DE-ESCALATION AND RETESTING PROTOCOL  on [email or web address]     **The [employer] GeneXpert Xpress SARS-CoV-2 RT-PCR Test has been made  available for use under the Emergency Use Authorization (EUA) only.             SARS-CoV-2 Source NP Swab     Narrative:       Collected By: [id number] JUAN CARLSO RAMOS     URINALYSIS [[id number]]  (Abnormal) Collected: 01/05/23 1515     Order Status: Completed Specimen: Urine, Clean Catch Updated: 01/05/23 1603       Color Yellow       Character Clear       Specific Gravity 1.014       Ph 6.5       Glucose Negative mg/dL         Ketones Negative mg/dL         Protein Negative mg/dL         Bilirubin Negative       Urobilinogen, Urine 0.2       Nitrite Negative       Leukocyte Esterase Negative       Occult Blood Small       Micro Urine Req Microscopic     Narrative:       Collected By: [id number] JUAN CARLOS RAMOS     FLUID CULTURE W/GRAM STAIN [[id number]] Collected: 01/02/23 1545     Order Status: Completed Specimen: Synovial Updated: 01/05/23 0945       Significant Indicator NEG       Source SYNO       Site Left Shoulder       Culture Result No growth at 72 hours.       Gram Stain Result Rare WBCs.  No organisms seen.        BLOOD CULTURE [[id number]] Collected: 01/03/23 0842     Order Status: Completed Specimen: Blood from Peripheral Updated: 01/04/23 0728       Significant Indicator NEG       Source BLD       Site PERIPHERAL       Culture Result No Growth  Note: Blood cultures are incubated for 5 days and  are monitored continuously.Positive blood cultures  are called to the RN and reported  "as soon as  they are identified.        Narrative:       Per Hospital Policy: Only change Specimen Src: to \"Line\" if  specified by physician order.  Right Hand     BLOOD CULTURE [[id number]] Collected: 01/03/23 0842     Order Status: Completed Specimen: Blood from Peripheral Updated: 01/04/23 0728       Significant Indicator NEG       Source BLD       Site PERIPHERAL       Culture Result No Growth  Note: Blood cultures are incubated for 5 days and  are monitored continuously.Positive blood cultures  are called to the RN and reported as soon as  they are identified.        Narrative:       Per Hospital Policy: Only change Specimen Src: to \"Line\" if  specified by physician order.  Left Forearm/Arm     URINALYSIS [[id number]]  (Abnormal) Collected: 01/03/23 1952     Order Status: Completed Specimen: Urine, Cath Updated: 01/03/23 2013       Color Yellow       Character Clear       Specific Gravity 1.020       Ph 5.5       Glucose Negative mg/dL         Ketones Negative mg/dL         Protein Negative mg/dL         Bilirubin Negative       Urobilinogen, Urine 0.2       Nitrite Negative       Leukocyte Esterase Negative       Occult Blood Small       Micro Urine Req Microscopic     Narrative:       Collected By: [id number] CECI BRANDT     GRAM STAIN [[id number]] Collected: 01/02/23 1545     Order Status: Completed Specimen: Synovial Updated: 01/02/23 1836       Significant Indicator .       Source SYNO       Site Left Shoulder       Gram Stain Result Rare WBCs.  No organisms seen.        FLUID CULTURE W/GRAM STAIN [[id number]]       Order Status: No result Specimen: Body Fluid from Synovial Fluid       Blood Culture [[id number]] Collected: 12/26/22 2202     Order Status: Completed Specimen: Blood from Peripheral Updated: 12/31/22 2300       Significant Indicator NEG       Source BLD       Site PERIPHERAL       Culture Result No growth after 5 days of incubation.     Narrative:       1 of 2 for Blood Culture x 2 sites order. Per " "Hospital  Policy: Only change Specimen Src: to \"Line\" if specified by  physician order.  No site indicated     Blood Culture [[id number]] Collected: 12/26/22 2225     Order Status: Completed Specimen: Blood from Peripheral Updated: 12/31/22 2300       Significant Indicator NEG       Source BLD       Site PERIPHERAL       Culture Result No growth after 5 days of incubation.     Narrative:       2 of 2 blood culture x2  Sites order. Per Hospital Policy:  Only change Specimen Src: to \"Line\" if specified by physician  order.  Left Hand     CULTURE TISSUE W/ GRM STAIN [[id number]]  (Abnormal) Collected: 12/28/22 1351     Order Status: Completed Specimen: Tissue Updated: 12/31/22 1205       Significant Indicator POS       Source TISS       Site Left anterior tibia       Culture Result -       Gram Stain Result Rare WBCs.  No organisms seen.          Culture Result Streptococcus agalactiae (Group B)  Light growth       Narrative:       Surgery Specimen     Anaerobic Culture [[id number]] Collected: 12/28/22 1351     Order Status: Completed Specimen: Tissue Updated: 12/31/22 1205       Significant Indicator NEG       Source TISS       Site Left anterior tibia       Culture Result No Anaerobes isolated.     Narrative:       Surgery Specimen     CULTURE TISSUE W/ GRM STAIN [[id number]]  (Abnormal) Collected: 12/28/22 1350     Order Status: Completed Specimen: Tissue Updated: 12/31/22 1201       Significant Indicator POS       Source TISS       Site Left knee lateral gutter       Culture Result -       Gram Stain Result Many WBCs.  No organisms seen.          Culture Result Streptococcus agalactiae (Group B)  Light growth       Narrative:       Surgery Specimen     Anaerobic Culture [[id number]] Collected: 12/28/22 1350     Order Status: Completed Specimen: Tissue Updated: 12/31/22 1201       Significant Indicator NEG       Source TISS       Site Left knee lateral gutter       Culture Result No Anaerobes isolated.     Narrative:   "     Surgery Specimen     CULTURE TISSUE W/ GRM STAIN [[id number]]  (Abnormal) Collected: 12/28/22 1432     Order Status: Completed Specimen: Tissue Updated: 12/31/22 1200       Significant Indicator POS       Source TISS       Site Left femur       Culture Result -       Gram Stain Result Rare WBCs.  No organisms seen.          Culture Result Streptococcus agalactiae (Group B)  Light growth       Narrative:       Surgery Specimen     Anaerobic Culture [[id number]] Collected: 12/28/22 1432     Order Status: Completed Specimen: Tissue Updated: 12/31/22 1200       Significant Indicator NEG       Source TISS       Site Left femur       Culture Result No Anaerobes isolated.     Narrative:       Surgery Specimen     CULTURE TISSUE W/ GRM STAIN [[id number]]  (Abnormal) Collected: 12/28/22 1433     Order Status: Completed Specimen: Tissue Updated: 12/31/22 1159       Significant Indicator POS       Source TISS       Site Left Tibia       Culture Result -       Gram Stain Result Many WBCs.  No organisms seen.          Culture Result Streptococcus agalactiae (Group B)  Light growth       Narrative:       Surgery Specimen     Anaerobic Culture [[id number]] Collected: 12/28/22 1433     Order Status: Completed Specimen: Tissue Updated: 12/31/22 1159       Significant Indicator NEG       Source TISS       Site Left Tibia       Culture Result No Anaerobes isolated.     Narrative:       Surgery Specimen     CULTURE TISSUE W/ GRM STAIN [[id number]]  (Abnormal) Collected: 12/28/22 1349     Order Status: Completed Specimen: Tissue Updated: 12/31/22 1159       Significant Indicator POS       Source TISS       Site Left Knee Medial Gutter       Culture Result -       Gram Stain Result Rare WBCs.  No organisms seen.          Culture Result Streptococcus agalactiae (Group B)  Light growth       Narrative:       Surgery Specimen     Anaerobic Culture [[id number]] Collected: 12/28/22 1349     Order Status: Completed Specimen: Tissue Updated:  12/31/22 1159       Significant Indicator NEG       Source TISS       Site Left Knee Medial Gutter       Culture Result No Anaerobes isolated.     Narrative:       Surgery Specimen     FLUID CULTURE W/GRAM STAIN  Order: [id number]  Status: Final result     Visible to patient: No (inaccessible in MyChart)     Next appt: Today at 11:00 AM in Infectious Diseases ([email or web address])     Specimen Information: Synovial Fluid   0 Result Notes      Component 1 mo ago   Significant Indicator POS Positive (POS)    Source SYNO    Site Left Knee    Culture Result - Abnormal     Gram Stain Result  Abnormal   Many WBCs.   Rare Gram positive cocci.     Culture Result  Abnormal   Streptococcus agalactiae (Group B)   Light growth   This isolate does NOT demonstrate inducible Clindamycin   resistance in vitro.     Resulting Agency M        Susceptibility     Streptococcus agalactiae (group b)     [facility]     Clindamycin <=0.25 mcg/mL Sensitive     Daptomycin <=0.5 mcg/mL Sensitive     Penicillin <=0.03 mcg/mL Sensitive                     Impression/Assessment      1. Type 2 diabetes mellitus without complication, without long-term current use of insulin (formerly [facility])        2. Streptococcal arthritis of left knee (formerly [facility])        3. Infection of prosthetic knee joint, subsequent encounter  amoxicillin-clavulanate (AUGMENTIN) 500-125 MG Tab      4. History of illicit drug use          April Alicia is a 62 y.o. female with a history of alcohol and cocaine abuse, cirrhosis, asthma, A-fib on apixaban, 2 diabetes, hypertension. Patient was admitted on July 2022 with a group B strep bacteremia and left knee prosthetic joint infection along with a left shoulder septic arthritis and some subdeltoid abscess.  Plan was for her to discharge to a skilled nursing facility and continue IV ceftriaxone for 6 weeks until 8/30/2022 due to some unknown reason patient only received 4 weeks of antibiotics.  Patient was readmitted on 12/26/2022 due to  recurrent left knee pain and swelling.  She was taken back to the OR on 12/28 underwent explantation and placement of articulating  spacer, wound VAC.  Multiple OR cultures again growing group B Strep.   Underwent synovial fluid removal from the left shoulder on 1/2/2022 WBC 1051, 40% polys, 40% lymphs, not consistent with infection.  Streptococcus group B (penicillin sensitive). Plan at discharge with 6 weeks of IV antibiotics ceftriaxone 2 g daily till 2/7/2023. Orthopedic MD Garcia note recommending patient continue oral antibiotics for at least 1 year prior to reimplantation as she is undergone 2 I&D's in the past with 2 antibiotic spacers which were both failed.  She is at high risk for failing stage II reimplantation and will be subject to amputation if failed again.  We will continue with oral Augmentin 500/ 125 mg twice daily for an additional year.  Patient will need to follow-up with ID clinic in 1 month with repeat CBC/CMP.  Wound education provided to patient and to report to ER    PLAN:   - Continue  Augmentin 500 / 125 mg twice daily with an indefinite end date, plan to attempt stage II reimplantation in 1 year per orthopedic note.   - CBC/CMP pending   -Medication education provided  - Last A1C 5.4 from 1/1/23, controlled with diet alone  - Smoking sensation education provided  - History of illicit drug use, patient declines current use     Return visit: 2 month. Follow up with primary care physician for chronic medical problems      I have performed a physical exam,  updated ROS and plan today. I have reviewed previous images, labs, and provider notes.      EDITH Baez.    All Patients should seek medical re-evaluation or report to the ER for new, increasing or worsening symptoms. In some circumstances medical conditions can change from the initial evaluation and may require emergent medical re-evaluation. This includes but is not limited to chest pain, shortness of breath, atypical  abdominal pain, atypical headache, ALOC, fever >101, low blood pressure, high respiratory rate (above 30), low oxygen saturation (below 90%), acute delirium, abnormal bleeding, inability to tolerate any intake, weakness on one side of the body, any worsened or concerning conditions.    Please note that this dictation was created using voice recognition software. I have worked with technical experts from [facility] to optimize the interface.  I have made every reasonable attempt to correct obvious errors, but there may be errors of grammar and possibly content that I did not discover before finalizing the note.

## 2023-03-30 ENCOUNTER — OFFICE VISIT (OUTPATIENT)
Dept: PHYSICAL MEDICINE AND REHAB | Facility: MEDICAL CENTER | Age: 63
End: 2023-03-30
Payer: MEDICAID

## 2023-03-30 VITALS
DIASTOLIC BLOOD PRESSURE: 84 MMHG | HEIGHT: 68 IN | HEART RATE: 71 BPM | BODY MASS INDEX: 27.93 KG/M2 | WEIGHT: 184.3 LBS | TEMPERATURE: 98.7 F | SYSTOLIC BLOOD PRESSURE: 124 MMHG | OXYGEN SATURATION: 92 %

## 2023-03-30 DIAGNOSIS — M25.562 CHRONIC PAIN OF BOTH KNEES: Primary | ICD-10-CM

## 2023-03-30 DIAGNOSIS — Z13.31 POSITIVE DEPRESSION SCREENING: ICD-10-CM

## 2023-03-30 DIAGNOSIS — G89.29 CHRONIC PAIN OF BOTH KNEES: Primary | ICD-10-CM

## 2023-03-30 DIAGNOSIS — M25.561 CHRONIC PAIN OF BOTH KNEES: Primary | ICD-10-CM

## 2023-03-30 DIAGNOSIS — Z96.653 HISTORY OF BILATERAL KNEE REPLACEMENT: ICD-10-CM

## 2023-03-30 DIAGNOSIS — G89.29 OTHER CHRONIC PAIN: ICD-10-CM

## 2023-03-30 DIAGNOSIS — B99.9 INFECTION: ICD-10-CM

## 2023-03-30 DIAGNOSIS — K70.30 ALCOHOLIC CIRRHOSIS OF LIVER WITHOUT ASCITES (HCC): ICD-10-CM

## 2023-03-30 DIAGNOSIS — Z91.81 RISK FOR FALLS: ICD-10-CM

## 2023-03-30 DIAGNOSIS — G89.29 CENTRAL SENSITIZATION TO PAIN: ICD-10-CM

## 2023-03-30 PROCEDURE — 99213 OFFICE O/P EST LOW 20 MIN: CPT | Performed by: STUDENT IN AN ORGANIZED HEALTH CARE EDUCATION/TRAINING PROGRAM

## 2023-03-30 ASSESSMENT — FIBROSIS 4 INDEX: FIB4 SCORE: 6.24

## 2023-03-30 ASSESSMENT — PATIENT HEALTH QUESTIONNAIRE - PHQ9
CLINICAL INTERPRETATION OF PHQ2 SCORE: 4
5. POOR APPETITE OR OVEREATING: 2 - MORE THAN HALF THE DAYS
SUM OF ALL RESPONSES TO PHQ QUESTIONS 1-9: 16

## 2023-03-30 ASSESSMENT — PAIN SCALES - GENERAL: PAINLEVEL: 10=SEVERE PAIN

## 2023-03-30 NOTE — PROGRESS NOTES
"Follow-up patient Note    Interventional Pain and Spine  Physiatry (Physical Medicine and Rehabilitation)     Patient Name: April Alicia  : 1960  Date of service: 3/30/2023    Chief Complaint:   Chief Complaint   Patient presents with    Follow-Up     Acute pain of right shoulder due to trauma       HISTORY  Please see new patient note by Dr. Naylor for more details.     HPI  Today's visit   April Alicia ( 1960) is a female with The primary encounter diagnosis was Chronic pain of both knees. 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    Today follow-up notes ongoing pain in both knees. Notes swelling of both knees associated with increased warmth to touch. Right knee started swelling 1 month ago. Has not mentioned this to her ortho surgeon Dr. Luz at [facility]. 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 She is unsure if she has an infection of her right knee.    Pain severity 10/10 currently  Pt denies new numbness, tingling, or weakness.    Medical records reviewed by me at today's visit: ID note 3/16/23 indicating ongoing Augmentin 500/125 twice daily, and reference to Orthopedic MD Garcia note \"recommending patient continue oral antibiotics for at least 1 year prior to reimplantation as she is undergone 2 I&D's in the past with 2 antibiotic spacers which were both failed.  She is at high risk for failing stage II reimplantation and will be subject to amputation if failed again.  We will continue with oral Augmentin 500/ 125 mg twice daily for an additional year\"       ROS:   Red Flags ROS:   Fever, Chills, Sweats: Denies  Involuntary Weight Loss: Denies  Bladder Incontinence: Denies  Bowel Incontinence: denies  Saddle Anesthesia: Denies    All other systems reviewed and " negative.     PMHx:   Past Medical History:   Diagnosis Date    Arrhythmia     Arthritis     OA in knees    ASTHMA     Blood clotting disorder (HCC) 2018    right leg blood clot    Dental disorder     upper and lower dentures    Diabetes     Emphysema of lung (HCC)     Heart abnormality     leakage in left valve    Heart burn     left knee    Heart valve disease     Hypertension     Infection 9/4/2017    Infection 2018    Right knee after total knee    Liver disease     Pneumonia 02/2020    Seizure disorder ([facility])        PSHx:   Past Surgical History:   Procedure Laterality Date    PB REVISE KNEE JOINT REPLACE,ALL PARTS Left 12/28/2022    Procedure: REVISION, TOTAL ARTHROPLASTY, KNEE, ALL COMPONENTS-LEFT;  Surgeon: Wild Luz M.D.;  Location: [facility];  Service: Orthopedics    IRRIGATION & DEBRIDEMENT GENERAL Left 12/28/2022    Procedure: IRRIGATION AND DEBRIDEMENT, WOUND;  Surgeon: Wild Luz M.D.;  Location: [facility];  Service: Orthopedics    PB REVISE KNEE JOINT REPLACE,ALL PARTS Left 7/19/2022    Procedure: REVISION, TOTAL ARTHROPLASTY, KNEE, ALL COMPONENTS - ANTIBIOTIC SPACER;  Surgeon: Wild Luz M.D.;  Location: [facility];  Service: Orthopedics    IRRIGATION & DEBRIDEMENT GENERAL Left 7/17/2022    Procedure: IRRIGATION AND DEBRIDEMENT, SHOULDER;  Surgeon: Obdulio Gray M.D.;  Location: [facility];  Service: Orthopedics    PB TOTAL KNEE ARTHROPLASTY Left 8/24/2020    Procedure: ARTHROPLASTY, KNEE, TOTAL;  Surgeon: Víctor Faustin M.D.;  Location: [facility];  Service: Orthopedics    KNEE ARTHROPLASTY TOTAL Right 2018    IRRIGATION & DEBRIDEMENT ORTHO Right 9/3/2017    Procedure: IRRIGATION & DEBRIDEMENT ORTHO, POLY EXCHANGE;  Surgeon: Jerome Russell M.D.;  Location: [facility];  Service: Orthopedics    COLONOSCOPY WITH CLIPPING  10/28/2015    Procedure: COLONOSCOPY WITH CLIPPING;  Surgeon: Ruben Colon M.D.;   Location: [facility];  Service:     COLONOSCOPY WITH SCLEROTHERAPY  10/28/2015    Procedure: COLONOSCOPY WITH SCLEROTHERAPY;  Surgeon: Ruben Colon M.D.;  Location: [facility];  Service:     COLONOSCOPY WITH TATTOOING  10/28/2015    Procedure: COLONOSCOPY WITH TATTOOING;  Surgeon: Ruben Colon M.D.;  Location: [facility]OSCOPY [facility];  Service:     GYN SURGERY      hysteroscoopy    GYN SURGERY  1982    tubal ligation       Family Hx:   History reviewed. No pertinent family history.    Social Hx:  Social History     Socioeconomic History    Marital status: Single     Spouse name: Not on file    Number of children: Not on file    Years of education: Not on file    Highest education level: Not on file   Occupational History    Not on file   Tobacco Use    Smoking status: Former     Types: Cigarettes     Quit date: 2016     Years since quittin.2    Smokeless tobacco: Former     Quit date: 2021    Tobacco comments:     a few a day when I can   Vaping Use    Vaping Use: Never used   Substance and Sexual Activity    Alcohol use: No    Drug use: No    Sexual activity: Not on file   Other Topics Concern    Not on file   Social History Narrative    Not on file     Social Determinants of Health     Financial Resource Strain: Not on file   Food Insecurity: Not on file   Transportation Needs: Not on file   Physical Activity: Not on file   Stress: Not on file   Social Connections: Not on file   Intimate Partner Violence: Not on file   Housing Stability: Not on file       Allergies:  Allergies   Allergen Reactions    Nkda [No Known Drug Allergy]        Medications: reviewed on epic.   Outpatient Medications Marked as Taking for the 3/30/23 encounter (Office Visit) with Suze Naylor M.D.   Medication Sig Dispense Refill    REFRESH TEARS 0.5 % Solution       Cholecalciferol (VITAMIN D3) 50 MCG ( UT) Tab       TRUE METRIX BLOOD GLUCOSE TEST strip       hydrocortisone 1 %  Ointment       TechLite Lancets Misc       nystatin/triamcinolone (MYCOLOG) 773707-9.1 UNIT/GM-% Cream nystatin-triamcinolone 100,000 unit/g-0.1 % topical cream      nystatin/triamcinolone (MYCOLOG) 634658-5.1 UNIT/GM-% Cream       REGULOID 57.6 % Powder       Sodium Chloride (NS) Solution       dronedarone (MULTAQ) 400 MG Tab Take 1 Tablet by mouth.      potassium chloride ER (KLOR-CON) 10 MEQ tablet       amoxicillin-clavulanate (AUGMENTIN) 500-125 MG Tab Take 1 Tablet by mouth 2 times a day. 90 Tablet 1    acetaminophen (TYLENOL) 325 MG Tab Take 650 mg by mouth every four hours as needed.      bisacodyl (DULCOLAX) 10 MG Suppos Insert 10 mg into the rectum every day.      furosemide (LASIX) 20 MG Tab Take 1 Tablet by mouth every day. 30 Tablet 0    potassium chloride (MICRO-K) 10 MEQ capsule Take 1 Capsule by mouth every day. 30 Capsule 0    cetirizine (ZYRTEC) 10 MG Tab       ferrous sulfate 325 (65 Fe) MG tablet Take  by mouth.      lidocaine-prilocaine (EMLA) 2.5-2.5 % Cream       magnesium oxide (MAG-OX) 400 MG Tab tablet       Skin Protectants, Misc. (MINERIN CREME) Cream       zinc sulfate (ZINCATE) 220 (50 Zn) MG Cap Take 1 Capsule by mouth every day. 30 Capsule 3    sodium bicarbonate (SODIUM BICARBONATE) 650 MG Tab Take 2 Tablets by mouth 2 times a day. 120 Tablet 3    calcium carbonate 1000 MG Chew Tab Chew 1 Tablet 3 times a day as needed (heartburn). 30 Tablet     vitamin D2, Ergocalciferol, (DRISDOL) 1.25 MG (50942 UT) Cap capsule Take 1 Capsule by mouth every 7 days. 5 Capsule     lactulose 20 GM/30ML Solution Take 15 mL by mouth every day. 450 mL     MULTAQ 400 MG Tab Take 1 Tablet by mouth 2 times a day with meals. 60 Tablet     omeprazole (PRILOSEC) 20 MG delayed-release capsule Take 1 Capsule by mouth every 12 hours. 30 Capsule     atorvastatin (LIPITOR) 20 MG Tab Take 1 Tablet by mouth every evening. 90 Tablet 3    SPIRIVA HANDIHALER 18 MCG Cap Place 1 Capsule into inhaler and inhale every day.       gabapentin (NEURONTIN) 400 MG Cap Take 400 mg by mouth 2 times a day.      albuterol (VENTOLIN OR PROVENTIL) 108 (90 BASE) MCG/ACT Aero Soln inhalation aerosol Inhale 2 Puffs every four hours as needed for Shortness of Breath.          Current Outpatient Medications on File Prior to Visit   Medication Sig Dispense Refill    REFRESH TEARS 0.5 % Solution       Cholecalciferol (VITAMIN D3) 50 MCG (2000 UT) Tab       TRUE METRIX BLOOD GLUCOSE TEST strip       hydrocortisone 1 % Ointment       TechLite Lancets Misc       nystatin/triamcinolone (MYCOLOG) 149628-1.1 UNIT/GM-% Cream nystatin-triamcinolone 100,000 unit/g-0.1 % topical cream      nystatin/triamcinolone (MYCOLOG) 515172-5.1 UNIT/GM-% Cream       REGULOID 57.6 % Powder       Sodium Chloride (NS) Solution       dronedarone (MULTAQ) 400 MG Tab Take 1 Tablet by mouth.      potassium chloride ER (KLOR-CON) 10 MEQ tablet       amoxicillin-clavulanate (AUGMENTIN) 500-125 MG Tab Take 1 Tablet by mouth 2 times a day. 90 Tablet 1    acetaminophen (TYLENOL) 325 MG Tab Take 650 mg by mouth every four hours as needed.      bisacodyl (DULCOLAX) 10 MG Suppos Insert 10 mg into the rectum every day.      furosemide (LASIX) 20 MG Tab Take 1 Tablet by mouth every day. 30 Tablet 0    potassium chloride (MICRO-K) 10 MEQ capsule Take 1 Capsule by mouth every day. 30 Capsule 0    cetirizine (ZYRTEC) 10 MG Tab       ferrous sulfate 325 (65 Fe) MG tablet Take  by mouth.      lidocaine-prilocaine (EMLA) 2.5-2.5 % Cream       magnesium oxide (MAG-OX) 400 MG Tab tablet       Skin Protectants, Misc. (MINERIN CREME) Cream       zinc sulfate (ZINCATE) 220 (50 Zn) MG Cap Take 1 Capsule by mouth every day. 30 Capsule 3    sodium bicarbonate (SODIUM BICARBONATE) 650 MG Tab Take 2 Tablets by mouth 2 times a day. 120 Tablet 3    calcium carbonate 1000 MG Chew Tab Chew 1 Tablet 3 times a day as needed (heartburn). 30 Tablet     vitamin D2, Ergocalciferol, (DRISDOL) 1.25 MG (03282 UT) Cap  "capsule Take 1 Capsule by mouth every 7 days. 5 Capsule     lactulose 20 GM/30ML Solution Take 15 mL by mouth every day. 450 mL     MULTAQ 400 MG Tab Take 1 Tablet by mouth 2 times a day with meals. 60 Tablet     omeprazole (PRILOSEC) 20 MG delayed-release capsule Take 1 Capsule by mouth every 12 hours. 30 Capsule     atorvastatin (LIPITOR) 20 MG Tab Take 1 Tablet by mouth every evening. 90 Tablet 3    SPIRIVA HANDIHALER 18 MCG Cap Place 1 Capsule into inhaler and inhale every day.      gabapentin (NEURONTIN) 400 MG Cap Take 400 mg by mouth 2 times a day.      albuterol (VENTOLIN OR PROVENTIL) 108 (90 BASE) MCG/ACT Aero Soln inhalation aerosol Inhale 2 Puffs every four hours as needed for Shortness of Breath.       No current facility-administered medications on file prior to visit.         EXAMINATION     Physical Exam:   /84 (BP Location: Right arm, Patient Position: Sitting, BP Cuff Size: Adult)   Pulse 71   Temp 37.1 °C (98.7 °F) (Temporal)   Ht 1.727 m (5' 8\")   Wt 83.6 kg (184 lb 4.9 oz)   SpO2 92%     Constitutional:   Body Habitus: Body mass index is 28.02 kg/m².  Cooperation: Fully cooperates with exam  Appearance: Well-groomed, well-nourished.    Eyes: No scleral icterus to suggest severe liver disease, no proptosis to suggest severe hyperthyroidism    ENT -no obvious auditory deficits, no noticeable facial droop     Skin -no rashes or lesions noted     Respiratory-  breathing comfortably on room air, no audible wheezing    Cardiovascular-distal extremities warm and well perfused.  No lower extremity edema is noted.     Gastrointestinal - no obvious abdominal masses, non-distended    Psychiatric- alert and oriented ×3. Normal affect.     Gait not formally assessed, pt uses WC for mobility    Thoracic/Lumbar Spine/Sacral Spine/Hips   Inspection: No evidence of atrophy in bilateral lower extremities throughout     Increased warmth to touch at bilateral knees, worse on left than right.  Swelling " notable at bilateral knees, worse on left than right.  Generalized tenderness to palpation at bilateral knees.  Surgical incisions appear well-healed.  No increased redness or significant skin changes at right knee.      Key points for the international standards for neurological classification of spinal cord injury (ISNCSCI) to light touch.   Dermatome R L   L2 2 2   L3 2 2   L4 2 2   L5 2 2   S1 2 2   S2 2 2       Motor Exam Lower Extremities  ? Myotome R L   Hip flexion L2 5 5   Knee extension L3 5 5   Ankle dorsiflexion L4 5 5   Toe extension L5 5 5   Ankle plantarflexion S1 5 5         MEDICAL DECISION MAKING    Medical records review: see under HPI section.     DATA    Labs: Personally reviewed at today's visit:     Lab Results   Component Value Date/Time    SODIUM 144 03/16/2023 01:24 PM    POTASSIUM 4.7 03/16/2023 01:24 PM    CHLORIDE 114 (H) 03/16/2023 01:24 PM    CO2 22 03/16/2023 01:24 PM    ANION 8.0 03/16/2023 01:24 PM    GLUCOSE 205 (H) 03/16/2023 01:24 PM    BUN 16 03/16/2023 01:24 PM    CREATININE 0.76 03/16/2023 01:24 PM    CREATININE 0.9 03/12/2009 06:50 PM    CALCIUM 8.4 (L) 03/16/2023 01:24 PM    ASTSGOT 35 03/16/2023 01:24 PM    ALTSGPT 18 03/16/2023 01:24 PM    TBILIRUBIN 0.7 03/16/2023 01:24 PM    ALBUMIN 2.9 (L) 03/16/2023 01:24 PM    TOTPROTEIN 6.7 03/16/2023 01:24 PM    GLOBULIN 3.8 (H) 03/16/2023 01:24 PM    AGRATIO 0.8 03/16/2023 01:24 PM       Lab Results   Component Value Date/Time    PROTHROMBTM 24.3 (H) 07/28/2022 06:54 AM    INR 2.25 (H) 07/28/2022 06:54 AM        Lab Results   Component Value Date/Time    WBC 3.0 (L) 03/16/2023 01:24 PM    RBC 3.83 (L) 03/16/2023 01:24 PM    HEMOGLOBIN 10.5 (L) 03/16/2023 01:24 PM    HEMATOCRIT 34.4 (L) 03/16/2023 01:24 PM    MCV 89.8 03/16/2023 01:24 PM    MCH 27.4 03/16/2023 01:24 PM    MCHC 30.5 (L) 03/16/2023 01:24 PM    MPV 10.6 03/16/2023 01:24 PM    NEUTSPOLYS 57.60 03/16/2023 01:24 PM    LYMPHOCYTES 26.10 03/16/2023 01:24 PM    MONOCYTES  9.70 03/16/2023 01:24 PM    EOSINOPHILS 5.00 03/16/2023 01:24 PM    EOSINOPHILS 2 01/02/2023 03:45 PM    BASOPHILS 1.30 03/16/2023 01:24 PM    HYPOCHROMIA 1+ 12/26/2022 10:02 PM    ANISOCYTOSIS 1+ 12/28/2022 07:47 AM        Lab Results   Component Value Date/Time    HBA1C 5.4 01/01/2023 03:23 AM        Imaging:   I personally reviewed following images, these are my reads  XR R shoulder 12/28/22  Moderate OA of AC joint. Mild OA of GH joint. No acute fracture. See formal radiology report for further details.     X-ray right and left knee 10/27/2022  Evidence of bilateral TKA.  Hardware appears intact. See formal radiology report for further details.     IMAGING radiology reads. I reviewed the following radiology reads                                                         Results for orders placed during the hospital encounter of 12/26/22     DX-KNEE 2- LEFT     Impression  Antibiotic impregnated radiopaque beads within and about the knee arthroplasty.   Results for orders placed during the hospital encounter of 12/26/22     DX-KNEE 3 VIEWS LEFT     Impression  1.  Revised left knee arthroplasty is unchanged since prior. No evidence of hardware complication.  2.  No acute fracture or dislocation.  3.  Marked soft tissue swelling and suspected knee effusion.             Results for orders placed during the hospital encounter of 12/26/22     DX-SHOULDER 2+ RIGHT     Impression  1.  Moderately advanced acromioclavicular degenerative changes.  2.  Acromiohumeral narrowing suggesting chronic rotator cuff tears.  3.  No acute fracture or dislocation.           MRI left shoulder 7/2022  FINDINGS: The study is limited by patient motion artifact.     Osseous acromial outlet: The acromion demonstrates a curved undersurface. There is no lateral downsloping.  There is no evidence of an inferiorly directed subacromial enthesophyte.  The acromioclavicular joint is widened with fragmentation of the distal clavicle possibly related  to prior surgery or trauma.  There is a large amount of fluid seen within the subacromial/subdeltoid bursa. Large joint effusion extends into the subacromial/subdeltoid bursa. There are pockets of low signal gas and the subdeltoid bursa. There is synovitis within the bursa.     Rotator cuff: There is complete full-thickness tear of the supraspinatus tendon with retraction to the bony glenoid. There is prominent muscle atrophy and edema. There is partial thickness tear of the articular surface fibers of the infraspinatus with   prominent muscle atrophy and edema. There is full-thickness tear of some of the fibers of the subscapularis tendon. There is prominent muscle edema and atrophy.     Glenoid labrum: There is diffuse tearing of the glenoid labrum include the biceps anchor.     Osseous structures/Cartilaginous surfaces: There is cartilage loss involving the glenohumeral joint with osteophytic spurring. There is mild marrow edema involving the humeral head and edema involving the bony glenoid posteriorly. There is some posterior   subluxation of the humeral head with respect to the bony glenoid.     Miscellaneous: There is a large joint effusion with synovitis. Fluid is again noted to extend into the subacromial/subdeltoid bursa where there is gas within the bursa. The long head of the biceps tendon is dislocated medially from the bicipital groove   and is diffusely thickened. There is extensive edema/induration of all of the soft tissues surrounding the shoulder. This extends into the axilla and proximal humerus.     IMPRESSION:     1.  Large joint effusion with a large amount of fluid seen extending into the subacromial/subdeltoid bursa with synovitis and small low signal foci within the bursa consistent with pockets of gas. These findings are very suspicious for septic arthritis   and septic bursitis.     2.  Complete full-thickness tear of the supraspinatus tendon with retraction to the bony glenoid.     3.   Full-thickness tear portion of the subscapularis tendon.     4.  Partial-thickness tear of the articular surface fibers of the infraspinatus tendon.     5.  Prominent muscle edema and atrophy involving the rotator cuff as well as all the muscles surrounding the shoulder which may represent myositis.     6.  Osteoarthritis of the glenohumeral joint.     7.  Diffuse tearing of the glenoid labrum.     8.  Subluxation of the long head of the biceps tendon medially from the bicipital groove with marked thinning of the intra-articular portion consistent with tendinosis.     9.  Extensive soft tissue edema to include fat and muscle involving all of the imaged structures around the shoulder likely representing cellulitis.     10.  Widening of the acromioclavicular joint with fragmentation of the distal clavicle consistent with old posttraumatic change versus surgical change.       Diagnosis  Visit Diagnoses     ICD-10-CM   1. Chronic pain of both knees  M25.561    M25.562    G89.29   2. History of bilateral knee replacement  Z96.653   3. Infection left TKA  B99.9   4. Other chronic pain  G89.29   5. 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 Alcoholic cirrhosis of liver without ascites (HCC)  K70.30         ASSESSMENT AND PLAN:  April Alicia (: 1960) is a female presenting with acute on chronic pain at bilateral knees where she has had TKAs.  She is currently being treated for infected left TKA and has antibiotics spacer in place, and is currently followed by ID and orthopedics.  Today she notes 1 month history of new onset swelling and increased warmth to touch at her right knee.  I have concern for infection of right TKA today. She notes her new R knee symptoms have not been evaluated by her ID physician or her orthopedist.  She denies systemic signs of infection such as fevers and chills.     April was seen today for follow-up.    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 Currently taking 400mg BID for symptoms of neuropathy. I previously referred her back to her PCP for this as our consult is intended for consideration of interventions.   - can consider NSAIDs or narcotics pending PCP discretion regarding risk/benefit profile.   - avoid tylenol due to cirrhosis     Interventions:   -Discussed that at this time I believe it would be in her best interest to defer any injections in the interest of her safety as she is currently on antibiotics and being treated for infection.  Her current pain is worst at her bilateral knees, with known infection of her left TKA and possible infection of her right TKA based on my impression today, as the patient notes significant increased swelling and warmth of her right knee x 1 month with no inciting trauma with slightly increased warmth to touch on exam.  She notes her new R knee symptoms have not been evaluated by her ID physician or her orthopedist.   -Discussed that for chronic knee pain after TKA, diagnostic genicular nerve blocks could be considered.  However I discussed that the efficacy of this procedure would be limited if she is having a concurrent process of her right knee that also contributes to acute pain, such as infection, and as such should be deferred at this time given limited anticipated benefit that would prohibit a possibly efficacious genicular nerve block from being performed in the future.   - Discussed that we would require clearance for  diagnostic genicular nerve blocks for right knee pain from orthopedics and infectious disease prior to proceeding with diagnostic genicular nerve blocks.     Other  - f/u with ID and ortho re: infected L TKA and to assess new symptoms at right TKA in my opinion are concerning for infection  -I have messaged her ID provider NELLA Baez to inform him of new concern for right knee infection  -I we will have my office send a copy of my note to her orthopedist Dr. Wild Luz at [facility]  -The patient asked for pain medications today.  She has mentioned that her PCP does not prescribe narcotics.  Discussed that my practice is not accepting new referrals for chronic narcotic management at this time and referrals for pain related to active infection.  Discussed that I would contact her PCP with the patient's interest in obtaining a referral to a different pain management practice that may prescribe medications.     Follow-up: as needed for injections -- pt to contact me when she is cleared of infection and would like to consider injections    No orders of the defined types were placed in this encounter.      Suze Naylor MD  Interventional Pain and Spine  Physical Medicine and Rehabilitation  [facility]      The above note documents my personal evaluation of this patient. In addition, I have reviewed and confirmed with the patient and MA the supportive information documented in today's Patient Health Questionnaire and Office Note.     Please note that this dictation was created using voice recognition software. I have made every reasonable attempt to correct obvious errors, but I expect that there are errors of grammar and possibly content that I did not discover before finalizing the note.

## 2023-03-30 NOTE — Clinical Note
Gera Ignacio,  I saw Priyanka today.  She has noticed 1 month of new swelling and increased warmth to touch in her right knee.  This is also apparent on exam today.  I discussed that I am concerned for a new infection of her right TKA hardware and I would message you regarding any further follow up and evaluation that needs to be done.  I will also send my note to her orthopedist Dr. Wild Luz at [facility].  She will follow-up with me as needed for injections.    I discussed that for chronic knee pain, I would consider diagnostic genicular nerve block injections.  However the utility of this would be limited with treating acute knee pain, such as in her situation.  Additionally for safety reasons I would be hesitant to perform injections in the context of active infection.  I will follow-up with her as needed for injections.  Thanks, Suze Naylor MD Inter[facility] Medicine and [facility]

## 2023-03-30 NOTE — PATIENT INSTRUCTIONS
We discussed diagnostic genicular nerve blocks for her right knee pain. 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 Concern for new infection of right knee.    We require clearance for diagnostic genicular nerve blocks for right knee pain from orthopedics and infectious disease prior to proceeding with diagnostic genicular nerve blocks.    Discussed that diagnostic genicular nerve blocks likely would not help if there is presence of infection.

## 2023-04-18 ENCOUNTER — APPOINTMENT (OUTPATIENT)
Dept: RADIOLOGY | Facility: MEDICAL CENTER | Age: 63
DRG: 560 | End: 2023-04-18
Attending: EMERGENCY MEDICINE
Payer: MEDICAID

## 2023-04-18 ENCOUNTER — OFFICE VISIT (OUTPATIENT)
Dept: INFECTIOUS DISEASES | Facility: MEDICAL CENTER | Age: 63
DRG: 560 | End: 2023-04-18
Attending: NURSE PRACTITIONER
Payer: MEDICAID

## 2023-04-18 ENCOUNTER — HOSPITAL ENCOUNTER (INPATIENT)
Facility: MEDICAL CENTER | Age: 63
LOS: 3 days | DRG: 560 | End: 2023-04-23
Attending: EMERGENCY MEDICINE | Admitting: STUDENT IN AN ORGANIZED HEALTH CARE EDUCATION/TRAINING PROGRAM
Payer: MEDICAID

## 2023-04-18 VITALS
WEIGHT: 173.06 LBS | DIASTOLIC BLOOD PRESSURE: 90 MMHG | BODY MASS INDEX: 26.23 KG/M2 | RESPIRATION RATE: 16 BRPM | SYSTOLIC BLOOD PRESSURE: 114 MMHG | HEIGHT: 68 IN | OXYGEN SATURATION: 95 % | TEMPERATURE: 99 F | HEART RATE: 72 BPM

## 2023-04-18 DIAGNOSIS — M25.562 ACUTE PAIN OF LEFT KNEE: ICD-10-CM

## 2023-04-18 DIAGNOSIS — R50.9 FEVER, UNSPECIFIED FEVER CAUSE: ICD-10-CM

## 2023-04-18 DIAGNOSIS — M00.262 STREPTOCOCCAL ARTHRITIS OF LEFT KNEE (HCC): ICD-10-CM

## 2023-04-18 DIAGNOSIS — R10.13 EPIGASTRIC PAIN: ICD-10-CM

## 2023-04-18 DIAGNOSIS — E11.9 TYPE 2 DIABETES MELLITUS WITHOUT COMPLICATION, WITHOUT LONG-TERM CURRENT USE OF INSULIN (HCC): ICD-10-CM

## 2023-04-18 DIAGNOSIS — Z96.659 INFECTION OF PROSTHETIC KNEE JOINT, SUBSEQUENT ENCOUNTER: ICD-10-CM

## 2023-04-18 DIAGNOSIS — F19.91 HISTORY OF ILLICIT DRUG USE: ICD-10-CM

## 2023-04-18 DIAGNOSIS — T84.59XD INFECTION OF PROSTHETIC KNEE JOINT, SUBSEQUENT ENCOUNTER: ICD-10-CM

## 2023-04-18 PROBLEM — I48.0 PAROXYSMAL ATRIAL FIBRILLATION (HCC): Status: ACTIVE | Noted: 2021-08-13

## 2023-04-18 PROBLEM — E66.3 OVERWEIGHT: Status: ACTIVE | Noted: 2023-04-18

## 2023-04-18 PROBLEM — D61.818 PANCYTOPENIA (HCC): Status: ACTIVE | Noted: 2022-07-26

## 2023-04-18 LAB
ALBUMIN SERPL BCP-MCNC: 2.8 G/DL (ref 3.2–4.9)
ALBUMIN/GLOB SERPL: 0.7 G/DL
ALP SERPL-CCNC: 258 U/L (ref 30–99)
ALT SERPL-CCNC: 25 U/L (ref 2–50)
ANION GAP SERPL CALC-SCNC: 7 MMOL/L (ref 7–16)
APPEARANCE UR: CLEAR
AST SERPL-CCNC: 31 U/L (ref 12–45)
BASOPHILS # BLD AUTO: 1.1 % (ref 0–1.8)
BASOPHILS # BLD: 0.05 K/UL (ref 0–0.12)
BILIRUB SERPL-MCNC: 0.5 MG/DL (ref 0.1–1.5)
BILIRUB UR QL STRIP.AUTO: NEGATIVE
BUN SERPL-MCNC: 20 MG/DL (ref 8–22)
CALCIUM ALBUM COR SERPL-MCNC: 9.5 MG/DL (ref 8.5–10.5)
CALCIUM SERPL-MCNC: 8.5 MG/DL (ref 8.5–10.5)
CHLORIDE SERPL-SCNC: 108 MMOL/L (ref 96–112)
CO2 SERPL-SCNC: 24 MMOL/L (ref 20–33)
COLOR UR: YELLOW
CREAT SERPL-MCNC: 1.02 MG/DL (ref 0.5–1.4)
CRP SERPL HS-MCNC: 0.3 MG/DL (ref 0–0.75)
EOSINOPHIL # BLD AUTO: 0.15 K/UL (ref 0–0.51)
EOSINOPHIL NFR BLD: 3.2 % (ref 0–6.9)
ERYTHROCYTE [DISTWIDTH] IN BLOOD BY AUTOMATED COUNT: 59.2 FL (ref 35.9–50)
ERYTHROCYTE [SEDIMENTATION RATE] IN BLOOD BY WESTERGREN METHOD: 23 MM/HOUR (ref 0–25)
GFR SERPLBLD CREATININE-BSD FMLA CKD-EPI: 62 ML/MIN/1.73 M 2
GLOBULIN SER CALC-MCNC: 3.8 G/DL (ref 1.9–3.5)
GLUCOSE BLD STRIP.AUTO-MCNC: 119 MG/DL (ref 65–99)
GLUCOSE SERPL-MCNC: 102 MG/DL (ref 65–99)
GLUCOSE UR STRIP.AUTO-MCNC: NEGATIVE MG/DL
HCT VFR BLD AUTO: 36.2 % (ref 37–47)
HGB BLD-MCNC: 11.5 G/DL (ref 12–16)
IMM GRANULOCYTES # BLD AUTO: 0.02 K/UL (ref 0–0.11)
IMM GRANULOCYTES NFR BLD AUTO: 0.4 % (ref 0–0.9)
KETONES UR STRIP.AUTO-MCNC: NEGATIVE MG/DL
LACTATE SERPL-SCNC: 0.8 MMOL/L (ref 0.5–2)
LEUKOCYTE ESTERASE UR QL STRIP.AUTO: NEGATIVE
LIPASE SERPL-CCNC: 27 U/L (ref 11–82)
LYMPHOCYTES # BLD AUTO: 1.32 K/UL (ref 1–4.8)
LYMPHOCYTES NFR BLD: 28.3 % (ref 22–41)
MCH RBC QN AUTO: 27.9 PG (ref 27–33)
MCHC RBC AUTO-ENTMCNC: 31.8 G/DL (ref 33.6–35)
MCV RBC AUTO: 87.9 FL (ref 81.4–97.8)
MICRO URNS: NORMAL
MONOCYTES # BLD AUTO: 0.37 K/UL (ref 0–0.85)
MONOCYTES NFR BLD AUTO: 7.9 % (ref 0–13.4)
NEUTROPHILS # BLD AUTO: 2.75 K/UL (ref 2–7.15)
NEUTROPHILS NFR BLD: 59.1 % (ref 44–72)
NITRITE UR QL STRIP.AUTO: NEGATIVE
NRBC # BLD AUTO: 0 K/UL
NRBC BLD-RTO: 0 /100 WBC
PH UR STRIP.AUTO: 8 [PH] (ref 5–8)
PLATELET # BLD AUTO: 87 K/UL (ref 164–446)
PMV BLD AUTO: 10.1 FL (ref 9–12.9)
POTASSIUM SERPL-SCNC: 4.6 MMOL/L (ref 3.6–5.5)
PROCALCITONIN SERPL-MCNC: 0.06 NG/ML
PROT SERPL-MCNC: 6.6 G/DL (ref 6–8.2)
PROT UR QL STRIP: NEGATIVE MG/DL
RBC # BLD AUTO: 4.12 M/UL (ref 4.2–5.4)
RBC UR QL AUTO: NEGATIVE
SODIUM SERPL-SCNC: 139 MMOL/L (ref 135–145)
SP GR UR STRIP.AUTO: 1.01
UROBILINOGEN UR STRIP.AUTO-MCNC: 0.2 MG/DL
WBC # BLD AUTO: 4.7 K/UL (ref 4.8–10.8)

## 2023-04-18 PROCEDURE — 87040 BLOOD CULTURE FOR BACTERIA: CPT | Mod: 91

## 2023-04-18 PROCEDURE — 99213 OFFICE O/P EST LOW 20 MIN: CPT | Performed by: NURSE PRACTITIONER

## 2023-04-18 PROCEDURE — 76705 ECHO EXAM OF ABDOMEN: CPT

## 2023-04-18 PROCEDURE — 700111 HCHG RX REV CODE 636 W/ 250 OVERRIDE (IP): Performed by: EMERGENCY MEDICINE

## 2023-04-18 PROCEDURE — 74177 CT ABD & PELVIS W/CONTRAST: CPT

## 2023-04-18 PROCEDURE — 96365 THER/PROPH/DIAG IV INF INIT: CPT

## 2023-04-18 PROCEDURE — 700102 HCHG RX REV CODE 250 W/ 637 OVERRIDE(OP): Performed by: STUDENT IN AN ORGANIZED HEALTH CARE EDUCATION/TRAINING PROGRAM

## 2023-04-18 PROCEDURE — 36415 COLL VENOUS BLD VENIPUNCTURE: CPT

## 2023-04-18 PROCEDURE — 80053 COMPREHEN METABOLIC PANEL: CPT

## 2023-04-18 PROCEDURE — 83605 ASSAY OF LACTIC ACID: CPT

## 2023-04-18 PROCEDURE — 71045 X-RAY EXAM CHEST 1 VIEW: CPT

## 2023-04-18 PROCEDURE — 85025 COMPLETE CBC W/AUTO DIFF WBC: CPT

## 2023-04-18 PROCEDURE — 85652 RBC SED RATE AUTOMATED: CPT

## 2023-04-18 PROCEDURE — 81003 URINALYSIS AUTO W/O SCOPE: CPT

## 2023-04-18 PROCEDURE — 87641 MR-STAPH DNA AMP PROBE: CPT

## 2023-04-18 PROCEDURE — 99214 OFFICE O/P EST MOD 30 MIN: CPT | Performed by: SURGERY

## 2023-04-18 PROCEDURE — 99285 EMERGENCY DEPT VISIT HI MDM: CPT

## 2023-04-18 PROCEDURE — 87086 URINE CULTURE/COLONY COUNT: CPT

## 2023-04-18 PROCEDURE — G0378 HOSPITAL OBSERVATION PER HR: HCPCS

## 2023-04-18 PROCEDURE — A9270 NON-COVERED ITEM OR SERVICE: HCPCS | Performed by: STUDENT IN AN ORGANIZED HEALTH CARE EDUCATION/TRAINING PROGRAM

## 2023-04-18 PROCEDURE — 700117 HCHG RX CONTRAST REV CODE 255: Performed by: EMERGENCY MEDICINE

## 2023-04-18 PROCEDURE — 700105 HCHG RX REV CODE 258: Performed by: EMERGENCY MEDICINE

## 2023-04-18 PROCEDURE — 99223 1ST HOSP IP/OBS HIGH 75: CPT | Mod: GC | Performed by: STUDENT IN AN ORGANIZED HEALTH CARE EDUCATION/TRAINING PROGRAM

## 2023-04-18 PROCEDURE — 86140 C-REACTIVE PROTEIN: CPT

## 2023-04-18 PROCEDURE — 83690 ASSAY OF LIPASE: CPT

## 2023-04-18 PROCEDURE — 73701 CT LOWER EXTREMITY W/DYE: CPT | Mod: RT

## 2023-04-18 PROCEDURE — 99211 OFF/OP EST MAY X REQ PHY/QHP: CPT | Mod: 25 | Performed by: NURSE PRACTITIONER

## 2023-04-18 PROCEDURE — 82962 GLUCOSE BLOOD TEST: CPT

## 2023-04-18 PROCEDURE — 84145 PROCALCITONIN (PCT): CPT

## 2023-04-18 RX ORDER — OXYCODONE HYDROCHLORIDE 5 MG/1
5 TABLET ORAL
Status: DISCONTINUED | OUTPATIENT
Start: 2023-04-18 | End: 2023-04-23 | Stop reason: HOSPADM

## 2023-04-18 RX ORDER — AMOXICILLIN 250 MG
2 CAPSULE ORAL 2 TIMES DAILY
Status: DISCONTINUED | OUTPATIENT
Start: 2023-04-18 | End: 2023-04-23 | Stop reason: HOSPADM

## 2023-04-18 RX ORDER — ONDANSETRON 2 MG/ML
4 INJECTION INTRAMUSCULAR; INTRAVENOUS EVERY 4 HOURS PRN
Status: DISCONTINUED | OUTPATIENT
Start: 2023-04-18 | End: 2023-04-23 | Stop reason: HOSPADM

## 2023-04-18 RX ORDER — FELODIPINE 5 MG/1
5 TABLET, EXTENDED RELEASE ORAL DAILY
COMMUNITY
End: 2023-07-06

## 2023-04-18 RX ORDER — HYDRALAZINE HYDROCHLORIDE 20 MG/ML
10 INJECTION INTRAMUSCULAR; INTRAVENOUS EVERY 4 HOURS PRN
Status: DISCONTINUED | OUTPATIENT
Start: 2023-04-18 | End: 2023-04-23 | Stop reason: HOSPADM

## 2023-04-18 RX ORDER — PROMETHAZINE HYDROCHLORIDE 25 MG/1
12.5-25 TABLET ORAL EVERY 4 HOURS PRN
Status: DISCONTINUED | OUTPATIENT
Start: 2023-04-18 | End: 2023-04-23 | Stop reason: HOSPADM

## 2023-04-18 RX ORDER — FELODIPINE 10 MG/1
10 TABLET, EXTENDED RELEASE ORAL DAILY
Status: SHIPPED | COMMUNITY
End: 2023-04-18

## 2023-04-18 RX ORDER — PROCHLORPERAZINE EDISYLATE 5 MG/ML
5-10 INJECTION INTRAMUSCULAR; INTRAVENOUS EVERY 4 HOURS PRN
Status: DISCONTINUED | OUTPATIENT
Start: 2023-04-18 | End: 2023-04-23 | Stop reason: HOSPADM

## 2023-04-18 RX ORDER — POLYETHYLENE GLYCOL 3350 17 G/17G
1 POWDER, FOR SOLUTION ORAL
Status: DISCONTINUED | OUTPATIENT
Start: 2023-04-18 | End: 2023-04-23 | Stop reason: HOSPADM

## 2023-04-18 RX ORDER — HYDROMORPHONE HYDROCHLORIDE 1 MG/ML
0.25 INJECTION, SOLUTION INTRAMUSCULAR; INTRAVENOUS; SUBCUTANEOUS
Status: DISCONTINUED | OUTPATIENT
Start: 2023-04-18 | End: 2023-04-23 | Stop reason: HOSPADM

## 2023-04-18 RX ORDER — ACETAMINOPHEN 325 MG/1
650 TABLET ORAL EVERY 6 HOURS PRN
Status: DISCONTINUED | OUTPATIENT
Start: 2023-04-18 | End: 2023-04-23 | Stop reason: HOSPADM

## 2023-04-18 RX ORDER — VITAMIN E 268 MG
400 CAPSULE ORAL DAILY
COMMUNITY
End: 2023-10-05

## 2023-04-18 RX ORDER — MULTIVIT WITH MINERALS/LUTEIN
1000 TABLET ORAL DAILY
COMMUNITY
End: 2023-07-06

## 2023-04-18 RX ORDER — PROMETHAZINE HYDROCHLORIDE 25 MG/1
12.5-25 SUPPOSITORY RECTAL EVERY 4 HOURS PRN
Status: DISCONTINUED | OUTPATIENT
Start: 2023-04-18 | End: 2023-04-23 | Stop reason: HOSPADM

## 2023-04-18 RX ORDER — OXYCODONE HYDROCHLORIDE 5 MG/1
2.5 TABLET ORAL
Status: DISCONTINUED | OUTPATIENT
Start: 2023-04-18 | End: 2023-04-23 | Stop reason: HOSPADM

## 2023-04-18 RX ORDER — SODIUM CHLORIDE, SODIUM LACTATE, POTASSIUM CHLORIDE, AND CALCIUM CHLORIDE .6; .31; .03; .02 G/100ML; G/100ML; G/100ML; G/100ML
30 INJECTION, SOLUTION INTRAVENOUS ONCE
Status: COMPLETED | OUTPATIENT
Start: 2023-04-18 | End: 2023-04-18

## 2023-04-18 RX ORDER — ATORVASTATIN CALCIUM 20 MG/1
20 TABLET, FILM COATED ORAL NIGHTLY
Status: DISCONTINUED | OUTPATIENT
Start: 2023-04-18 | End: 2023-04-23 | Stop reason: HOSPADM

## 2023-04-18 RX ORDER — CELECOXIB 100 MG/1
200 CAPSULE ORAL 2 TIMES DAILY PRN
Status: DISCONTINUED | OUTPATIENT
Start: 2023-04-23 | End: 2023-04-20 | Stop reason: ALTCHOICE

## 2023-04-18 RX ORDER — PANTOPRAZOLE SODIUM 20 MG/1
20 TABLET, DELAYED RELEASE ORAL DAILY
COMMUNITY

## 2023-04-18 RX ORDER — ONDANSETRON 4 MG/1
4 TABLET, ORALLY DISINTEGRATING ORAL EVERY 4 HOURS PRN
Status: DISCONTINUED | OUTPATIENT
Start: 2023-04-18 | End: 2023-04-23 | Stop reason: HOSPADM

## 2023-04-18 RX ORDER — BISACODYL 10 MG
10 SUPPOSITORY, RECTAL RECTAL
Status: DISCONTINUED | OUTPATIENT
Start: 2023-04-18 | End: 2023-04-23 | Stop reason: HOSPADM

## 2023-04-18 RX ORDER — CELECOXIB 100 MG/1
200 CAPSULE ORAL 2 TIMES DAILY
Status: COMPLETED | OUTPATIENT
Start: 2023-04-18 | End: 2023-04-23

## 2023-04-18 RX ORDER — LOSARTAN POTASSIUM 100 MG/1
100 TABLET ORAL DAILY
COMMUNITY
End: 2023-07-06

## 2023-04-18 RX ADMIN — OXYCODONE 5 MG: 5 TABLET ORAL at 21:27

## 2023-04-18 RX ADMIN — CELECOXIB 200 MG: 200 CAPSULE ORAL at 22:07

## 2023-04-18 RX ADMIN — IOHEXOL 100 ML: 350 INJECTION, SOLUTION INTRAVENOUS at 17:56

## 2023-04-18 RX ADMIN — PIPERACILLIN AND TAZOBACTAM 3.38 G: 3; .375 INJECTION, POWDER, LYOPHILIZED, FOR SOLUTION INTRAVENOUS; PARENTERAL at 19:54

## 2023-04-18 RX ADMIN — DOCUSATE SODIUM 50 MG AND SENNOSIDES 8.6 MG 2 TABLET: 8.6; 5 TABLET, FILM COATED ORAL at 21:28

## 2023-04-18 RX ADMIN — SODIUM CHLORIDE, POTASSIUM CHLORIDE, SODIUM LACTATE AND CALCIUM CHLORIDE 2361 ML: 600; 310; 30; 20 INJECTION, SOLUTION INTRAVENOUS at 15:48

## 2023-04-18 RX ADMIN — ATORVASTATIN CALCIUM 20 MG: 20 TABLET, FILM COATED ORAL at 21:28

## 2023-04-18 ASSESSMENT — LIFESTYLE VARIABLES
HAVE YOU EVER FELT YOU SHOULD CUT DOWN ON YOUR DRINKING: NO
ALCOHOL_USE: NO
CONSUMPTION TOTAL: NEGATIVE
AVERAGE NUMBER OF DAYS PER WEEK YOU HAVE A DRINK CONTAINING ALCOHOL: 0
HOW MANY TIMES IN THE PAST YEAR HAVE YOU HAD 5 OR MORE DRINKS IN A DAY: 0
TOTAL SCORE: 0
TOTAL SCORE: 0
HAVE PEOPLE ANNOYED YOU BY CRITICIZING YOUR DRINKING: NO
TOTAL SCORE: 0
SUBSTANCE_ABUSE: 0
EVER HAD A DRINK FIRST THING IN THE MORNING TO STEADY YOUR NERVES TO GET RID OF A HANGOVER: NO
ON A TYPICAL DAY WHEN YOU DRINK ALCOHOL HOW MANY DRINKS DO YOU HAVE: 0
EVER FELT BAD OR GUILTY ABOUT YOUR DRINKING: NO

## 2023-04-18 ASSESSMENT — FIBROSIS 4 INDEX
FIB4 SCORE: 6.34
FIB4 SCORE: 6.34
FIB4 SCORE: 4.49

## 2023-04-18 ASSESSMENT — ENCOUNTER SYMPTOMS
DIZZINESS: 0
NAUSEA: 0
WEIGHT LOSS: 1
BLURRED VISION: 0
CHILLS: 1
DOUBLE VISION: 0
ABDOMINAL PAIN: 0
VOMITING: 0
WEIGHT LOSS: 0
COUGH: 0
CONSTIPATION: 0
NERVOUS/ANXIOUS: 1
DIARRHEA: 0
FOCAL WEAKNESS: 0
DIZZINESS: 0
CHILLS: 1
HEADACHES: 0
BRUISES/BLEEDS EASILY: 0
FEVER: 1
BLURRED VISION: 0
HEADACHES: 0
SEIZURES: 0
MYALGIAS: 1
PALPITATIONS: 0
MYALGIAS: 0
VOMITING: 0
SHORTNESS OF BREATH: 0
WEAKNESS: 1
ABDOMINAL PAIN: 1
PALPITATIONS: 0
FEVER: 0
DIAPHORESIS: 0
SHORTNESS OF BREATH: 0
NAUSEA: 1
SENSORY CHANGE: 0
WHEEZING: 0
COUGH: 0
SPUTUM PRODUCTION: 0
DOUBLE VISION: 0

## 2023-04-18 NOTE — PROGRESS NOTES
INFECTIOUS  DISEASE  OUTPATIENT CLINIC  NOTE   Subjective   Primary care provider: Karen Mcclure P.A.-C..     Reason for Follow Up:   Follow-up for   1. Type 2 diabetes mellitus without complication, without long-term current use of insulin (HCC)        2. Streptococcal arthritis of left knee (HCC)        3. History of illicit drug use        4. Infection of prosthetic knee joint, subsequent encounter            HPI: Patient previously seen and treated by ID team as inpatient during hospital admission.   April Alicia is a 62 y.o. female with a history of alcohol and cocaine abuse, cirrhosis, asthma, A-fib on apixaban, 2 diabetes, hypertension. Patient was admitted on July 2022 with a group B strep bacteremia and left knee prosthetic joint infection along with a left shoulder septic arthritis and some subdeltoid abscess.  Plan was for her to discharge to a skilled nursing facility and continue IV ceftriaxone for 6 weeks until 8/30/2022 due to some unknown reason patient only received 4 weeks of antibiotics.  Patient was readmitted on 12/26/2022 due to recurrent left knee pain and swelling.  She was taken back to the OR on 12/28 underwent explantation and placement of articulating spacer, wound VAC.  Multiple OR cultures again growing group B Strep.   Underwent synovial fluid removal from the left shoulder on 1/2/2022 WBC 1051, 40% polys, 40% lymphs, not consistent with infection.  Plan at discharge with 6 weeks of IV antibiotics ceftriaxone 2 g daily till 2/7/2023    2/15-Orthopedic MD Garcai note recommending patient continue oral antibiotics for at least 1 year prior to reimplantation as she is undergone 2 I&D's in the past with 2 antibiotic spacers which were both failed.  She is at high risk for failing stage II reimplantation and will be subject to amputation if failed again.  We will continue with oral Augmentin 500/ 125 mg twice daily for an additional year    02/16/23- Today Patient reports  feeling well. Pt stating that the wound is healing well. Pt being followed by the [facility]. Wound pictures reviewed in EPIC. Denies drainage, pungent odor, redness, pain. Denies feeling generally ill, fevers/chills, general malaise, headache, n/v/d.     3/15/23- Today Patient reports feeling  good. Denies feeling generally ill, fevers/chills, general malaise, headache, n/v/d.  Patient states that she has not taken her Augmentin for the last 3 days as the care facility she was discharged from did not give her a supply.  Medication reordered and to be picked up at her pharmacy.  Tolerating the Augmentin with no complaints of side effects.     4/18/23-patient comes in today with complaints of not feeling well. She reports increasing fatigue, trouble staying awake, and decreasing appetite. Her symptoms started about 2 weeks ago when started to notice increased swelling of her left knee with warmth despite treatment with chronic dosing of Augmentin.  I am concerned that she may have possibly failed outpatient treatment and needs additional services to rule out worsening infection.  Patient escorted to ER      Current Antimicrobials: Augmentin 500/125 twice daily  Previous Antimicrobials: Unasyn, ceftriaxone, Ancef, cefazolin    Other Current Medications:  Home Medications    Medication Sig Taking? Last Dose Authorizing Provider   REFRESH TEARS 0.5 % Solution  Yes Taking Physician Outpatient   Cholecalciferol (VITAMIN D3) 50 MCG (2000 UT) Tab  Yes Taking Physician Outpatient   TRUE METRIX BLOOD GLUCOSE TEST strip  Yes Taking Physician Outpatient   hydrocortisone 1 % Ointment  Yes Taking Physician Outpatient   TechLite Lancets Misc  Yes Taking Physician Outpatient   nystatin/triamcinolone (MYCOLOG) 326381-6.1 UNIT/GM-% Cream nystatin-triamcinolone 100,000 unit/g-0.1 % topical cream Yes Taking Physician Outpatient   nystatin/triamcinolone (MYCOLOG) 958705-6.1 UNIT/GM-% Cream  Yes Taking Physician Outpatient    REGULOID 57.6 % Powder  Yes Taking Physician Outpatient   dronedarone (MULTAQ) 400 MG Tab Take 1 Tablet by mouth. Yes Taking Physician Outpatient   amoxicillin-clavulanate (AUGMENTIN) 500-125 MG Tab Take 1 Tablet by mouth 2 times a day. Yes Taking ALEJO Baez.P.R.N.   acetaminophen (TYLENOL) 325 MG Tab Take 650 mg by mouth every four hours as needed. Yes Taking Physician Outpatient   bisacodyl (DULCOLAX) 10 MG Suppos Insert 10 mg into the rectum every day. Yes Taking Physician Outpatient   furosemide (LASIX) 20 MG Tab Take 1 Tablet by mouth every day. Yes Taking Kirstie Whipple A.P.R.N.   potassium chloride (MICRO-K) 10 MEQ capsule Take 1 Capsule by mouth every day. Yes Taking Kirstie Whipple A.P.R.N.   cetirizine (ZYRTEC) 10 MG Tab  Yes Taking Physician Outpatient   ferrous sulfate 325 (65 Fe) MG tablet Take  by mouth. Yes Taking Physician Outpatient   lidocaine-prilocaine (EMLA) 2.5-2.5 % Cream  Yes Taking Physician Outpatient   magnesium oxide (MAG-OX) 400 MG Tab tablet  Yes Taking Physician Outpatient   Skin Protectants, Misc. (MINERIN CREME) Cream  Yes Taking Physician Outpatient   zinc sulfate (ZINCATE) 220 (50 Zn) MG Cap Take 1 Capsule by mouth every day. Yes Taking Rhys Martínez D.O.   sodium bicarbonate (SODIUM BICARBONATE) 650 MG Tab Take 2 Tablets by mouth 2 times a day. Yes Taking Rhys Martínez D.O.   calcium carbonate 1000 MG Chew Tab Chew 1 Tablet 3 times a day as needed (heartburn). Yes Taking Rhys Martínez D.O.   vitamin D2, Ergocalciferol, (DRISDOL) 1.25 MG (85651 UT) Cap capsule Take 1 Capsule by mouth every 7 days. Yes Taking Rhys Martínez D.O.   lactulose 20 GM/30ML Solution Take 15 mL by mouth every day. Yes Taking Rhys Martínez D.O.   MULTAQ 400 MG Tab Take 1 Tablet by mouth 2 times a day with meals. Yes Taking Rhys Martínez D.O.   omeprazole (PRILOSEC) 20 MG delayed-release capsule Take 1 Capsule by mouth every 12 hours. Yes Taking Rhys Martínez D.O.   atorvastatin  (LIPITOR) 20 MG Tab Take 1 Tablet by mouth every evening. Yes Taking Rhys Martínez D.O.   SPIRIVA HANDIHALER 18 MCG Cap Place 1 Capsule into inhaler and inhale every day. Yes Taking Physician Outpatient   gabapentin (NEURONTIN) 400 MG Cap Take 400 mg by mouth 2 times a day. Yes Taking Physician Outpatient   albuterol (VENTOLIN OR PROVENTIL) 108 (90 BASE) MCG/ACT Aero Soln inhalation aerosol Inhale 2 Puffs every four hours as needed for Shortness of Breath. Yes Taking Physician Outpatient        PMH:  Past Medical History:   Diagnosis Date    Arrhythmia     Arthritis     OA in knees    ASTHMA     Blood clotting disorder (HCC) 2018    right leg blood clot    Dental disorder     upper and lower dentures    Diabetes     Emphysema of lung (HCC)     Heart abnormality     leakage in left valve    Heart burn     left knee    Heart valve disease     Hypertension     Infection 9/4/2017    Infection 2018    Right knee after total knee    Liver disease     Pneumonia 02/2020    Seizure disorder (HCC)      Past Surgical History:   Procedure Laterality Date    PB REVISE KNEE JOINT REPLACE,ALL PARTS Left 12/28/2022    Procedure: REVISION, TOTAL ARTHROPLASTY, KNEE, ALL COMPONENTS-LEFT;  Surgeon: Wild Luz M.D.;  Location: [facility];  Service: Orthopedics    IRRIGATION & DEBRIDEMENT GENERAL Left 12/28/2022    Procedure: IRRIGATION AND DEBRIDEMENT, WOUND;  Surgeon: Wild Luz M.D.;  Location: [facility];  Service: Orthopedics    PB REVISE KNEE JOINT REPLACE,ALL PARTS Left 7/19/2022    Procedure: REVISION, TOTAL ARTHROPLASTY, KNEE, ALL COMPONENTS - ANTIBIOTIC SPACER;  Surgeon: Wild Luz M.D.;  Location: [facility];  Service: Orthopedics    IRRIGATION & DEBRIDEMENT GENERAL Left 7/17/2022    Procedure: IRRIGATION AND DEBRIDEMENT, SHOULDER;  Surgeon: Obdulio Gray M.D.;  Location: [facility];  Service: Orthopedics    PB TOTAL KNEE ARTHROPLASTY Left 8/24/2020    Procedure:  ARTHROPLASTY, KNEE, TOTAL;  Surgeon: Víctor Faustin M.D.;  Location: SURGERY [facility];  Service: Orthopedics    KNEE ARTHROPLASTY TOTAL Right 2018    IRRIGATION & DEBRIDEMENT ORTHO Right 9/3/2017    Procedure: IRRIGATION & DEBRIDEMENT ORTHO, POLY EXCHANGE;  Surgeon: Jerome Russell M.D.;  Location: SURGERY [facility];  Service: Orthopedics    COLONOSCOPY WITH CLIPPING  10/28/2015    Procedure: COLONOSCOPY WITH CLIPPING;  Surgeon: Ruben Colon M.D.;  Location: ENDOSCOPY [facility];  Service:     COLONOSCOPY WITH SCLEROTHERAPY  10/28/2015    Procedure: COLONOSCOPY WITH SCLEROTHERAPY;  Surgeon: Ruben Colon M.D.;  Location: ENDOSCOPY [facility];  Service:     COLONOSCOPY WITH TATTOOING  10/28/2015    Procedure: COLONOSCOPY WITH TATTOOING;  Surgeon: Ruben Colon M.D.;  Location: ENDOSCOPY [facility];  Service:     GYN SURGERY      hysteroscoopy    GYN SURGERY      tubal ligation     No family history on file.  Social History     Socioeconomic History    Marital status: Single     Spouse name: Not on file    Number of children: Not on file    Years of education: Not on file    Highest education level: Not on file   Occupational History    Not on file   Tobacco Use    Smoking status: Former     Types: Cigarettes     Quit date: 2016     Years since quittin.3    Smokeless tobacco: Former     Quit date: 2021    Tobacco comments:     a few a day when I can   Vaping Use    Vaping Use: Never used   Substance and Sexual Activity    Alcohol use: No    Drug use: No    Sexual activity: Not on file   Other Topics Concern    Not on file   Social History Narrative    Not on file     Social Determinants of Health     Financial Resource Strain: Not on file   Food Insecurity: Not on file   Transportation Needs: Not on file   Physical Activity: Not on file   Stress: Not on file   Social Connections: Not on file   Intimate Partner Violence: Not on file   Housing Stability:  "Not on file           Allergies/Intolerances:  Allergies   Allergen Reactions    Nkda [No Known Drug Allergy]        ROS:   Review of Systems   Constitutional:  Positive for chills and malaise/fatigue. Negative for fever and weight loss.   HENT:  Negative for congestion and hearing loss.    Eyes:  Negative for blurred vision and double vision.   Respiratory:  Negative for cough, sputum production, shortness of breath and wheezing.    Cardiovascular:  Negative for chest pain and palpitations.   Gastrointestinal:  Negative for abdominal pain, nausea and vomiting.   Genitourinary:  Negative for dysuria.   Musculoskeletal:  Positive for joint pain (Chronic left shoulder pain). Negative for myalgias.   Skin:  Negative for itching and rash.   Neurological:  Positive for weakness. Negative for dizziness, focal weakness and headaches.   Endo/Heme/Allergies:  Does not bruise/bleed easily.   Psychiatric/Behavioral:  The patient is nervous/anxious.     ROS was reviewed and were negative except as above.    Objective    Most Recent Vital Signs:  Blood Pressure (Abnormal) 114/90 (BP Location: Left arm, Patient Position: Sitting, BP Cuff Size: Adult)   Pulse 72   Temperature 37.2 °C (99 °F) (Temporal)   Respiration 16   Height 1.727 m (5' 8\")   Weight 78.5 kg (173 lb 1 oz)   Last Menstrual Period 06/02/2008   Oxygen Saturation 95%   Body Mass Index 26.31 kg/m²     Physical Exam:  Physical Exam  Vitals and nursing note reviewed.   Constitutional:       General: She is not in acute distress.     Appearance: She is ill-appearing.      Comments: Walker   HENT:      Head: Normocephalic and atraumatic.      Nose: Nose normal.      Mouth/Throat:      Mouth: Mucous membranes are moist.   Eyes:      Pupils: Pupils are equal, round, and reactive to light.   Cardiovascular:      Rate and Rhythm: Normal rate and regular rhythm.   Pulmonary:      Effort: Pulmonary effort is normal. No respiratory distress.      Breath sounds: Normal " breath sounds. No stridor.   Abdominal:      General: There is no distension.      Palpations: Abdomen is soft.   Musculoskeletal:         General: Swelling and tenderness present.      Cervical back: Normal range of motion.      Comments: Left knee surgical scar, no erythema  Increased swelling and  warmth on palpation   Skin:     General: Skin is warm and dry.      Coloration: Skin is not jaundiced or pale.   Neurological:      General: No focal deficit present.      Mental Status: She is oriented to person, place, and time. She is lethargic.      Cranial Nerves: No cranial nerve deficit.      Sensory: No sensory deficit.   Psychiatric:         Mood and Affect: Mood normal.         Behavior: Behavior normal.        Pertinent Lab/Imaging Results:  [unfilled]  @CMP@  WBC   Date/Time Value Ref Range Status   03/16/2023 01:24 PM 3.0 (L) 4.8 - 10.8 K/uL Final     RBC   Date/Time Value Ref Range Status   03/16/2023 01:24 PM 3.83 (L) 4.20 - 5.40 M/uL Final     Hemoglobin   Date/Time Value Ref Range Status   03/16/2023 01:24 PM 10.5 (L) 12.0 - 16.0 g/dL Final     Hematocrit   Date/Time Value Ref Range Status   03/16/2023 01:24 PM 34.4 (L) 37.0 - 47.0 % Final     MCV   Date/Time Value Ref Range Status   03/16/2023 01:24 PM 89.8 81.4 - 97.8 fL Final     MCH   Date/Time Value Ref Range Status   03/16/2023 01:24 PM 27.4 27.0 - 33.0 pg Final     MCHC   Date/Time Value Ref Range Status   03/16/2023 01:24 PM 30.5 (L) 33.6 - 35.0 g/dL Final     MPV   Date/Time Value Ref Range Status   03/16/2023 01:24 PM 10.6 9.0 - 12.9 fL Final      Sodium   Date/Time Value Ref Range Status   03/16/2023 01:24  135 - 145 mmol/L Final     Potassium   Date/Time Value Ref Range Status   03/16/2023 01:24 PM 4.7 3.6 - 5.5 mmol/L Final     Chloride   Date/Time Value Ref Range Status   03/16/2023 01:24  (H) 96 - 112 mmol/L Final     Co2   Date/Time Value Ref Range Status   03/16/2023 01:24 PM 22 20 - 33 mmol/L Final     Glucose   Date/Time  Value Ref Range Status   03/16/2023 01:24  (H) 65 - 99 mg/dL Final     Bun   Date/Time Value Ref Range Status   03/16/2023 01:24 PM 16 8 - 22 mg/dL Final     Creatinine   Date/Time Value Ref Range Status   03/16/2023 01:24 PM 0.76 0.50 - 1.40 mg/dL Final   03/12/2009 06:50 PM 0.9 0.5 - 1.4 mg/dL Final     Bun-Creatinine Ratio   Date/Time Value Ref Range Status   03/08/2022 10:02 PM 12.0  Final     Alkaline Phosphatase   Date/Time Value Ref Range Status   03/16/2023 01:24  (H) 30 - 99 U/L Final     AST(SGOT)   Date/Time Value Ref Range Status   03/16/2023 01:24 PM 35 12 - 45 U/L Final     ALT(SGPT)   Date/Time Value Ref Range Status   03/16/2023 01:24 PM 18 2 - 50 U/L Final     Total Bilirubin   Date/Time Value Ref Range Status   03/16/2023 01:24 PM 0.7 0.1 - 1.5 mg/dL Final      CPK Total   Date/Time Value Ref Range Status   08/04/2022 03:17  (H) 0 - 154 U/L Final          Blood Culture   Date Value Ref Range Status   09/04/2017 No growth after 5 days of incubation.  Final     Blood Culture Hold   Date Value Ref Range Status   10/03/2020 Collected  Final       Blood Culture   Date Value Ref Range Status   09/04/2017 No growth after 5 days of incubation.  Final     Blood Culture Hold   Date Value Ref Range Status   10/03/2020 Collected  Final    Micro:  Results         Procedure Component Value Units Date/Time     COV-2, FLU A/B, AND RSV BY PCR (2-4 HOURS [employer]): Collect NP swab in VTM [[id number]] Collected: 01/05/23 1531     Order Status: Completed Specimen: Respirate from Nasopharyngeal Updated: 01/05/23 1629       Influenza virus A RNA Negative       Influenza virus B, PCR Negative       RSV, PCR Negative       SARS-CoV-2 by PCR NotDetected       Comment: [facility] providers: PLEASE REFER TO DE-ESCALATION AND RETESTING PROTOCOL  on [email or web address]     **The [employer] GeneXpert Xpress SARS-CoV-2 RT-PCR Test has been made  available for use under the Emergency Use Authorization (EUA) only.             " SARS-CoV-2 Source NP Swab     Narrative:       Collected By: [id number] JUAN CARLOS RAMOS     URINALYSIS [[id number]]  (Abnormal) Collected: 01/05/23 1515     Order Status: Completed Specimen: Urine, Clean Catch Updated: 01/05/23 1603       Color Yellow       Character Clear       Specific Gravity 1.014       Ph 6.5       Glucose Negative mg/dL         Ketones Negative mg/dL         Protein Negative mg/dL         Bilirubin Negative       Urobilinogen, Urine 0.2       Nitrite Negative       Leukocyte Esterase Negative       Occult Blood Small       Micro Urine Req Microscopic     Narrative:       Collected By: [id number] JUAN CARLOS RAMOS     FLUID CULTURE W/GRAM STAIN [[id number]] Collected: 01/02/23 1545     Order Status: Completed Specimen: Synovial Updated: 01/05/23 0945       Significant Indicator NEG       Source SYNO       Site Left Shoulder       Culture Result No growth at 72 hours.       Gram Stain Result Rare WBCs.  No organisms seen.        BLOOD CULTURE [[id number]] Collected: 01/03/23 0842     Order Status: Completed Specimen: Blood from Peripheral Updated: 01/04/23 0728       Significant Indicator NEG       Source BLD       Site PERIPHERAL       Culture Result No Growth  Note: Blood cultures are incubated for 5 days and  are monitored continuously.Positive blood cultures  are called to the RN and reported as soon as  they are identified.        Narrative:       Per Hospital Policy: Only change Specimen Src: to \"Line\" if  specified by physician order.  Right Hand     BLOOD CULTURE [[id number]] Collected: 01/03/23 0842     Order Status: Completed Specimen: Blood from Peripheral Updated: 01/04/23 0728       Significant Indicator NEG       Source BLD       Site PERIPHERAL       Culture Result No Growth  Note: Blood cultures are incubated for 5 days and  are monitored continuously.Positive blood cultures  are called to the RN and reported as soon as  they are identified.        Narrative:       Per Hospital " "Policy: Only change Specimen Src: to \"Line\" if  specified by physician order.  Left Forearm/Arm     URINALYSIS [[id number]]  (Abnormal) Collected: 01/03/23 1952     Order Status: Completed Specimen: Urine, Cath Updated: 01/03/23 2013       Color Yellow       Character Clear       Specific Gravity 1.020       Ph 5.5       Glucose Negative mg/dL         Ketones Negative mg/dL         Protein Negative mg/dL         Bilirubin Negative       Urobilinogen, Urine 0.2       Nitrite Negative       Leukocyte Esterase Negative       Occult Blood Small       Micro Urine Req Microscopic     Narrative:       Collected By: [id number] CECI BRANDT     GRAM STAIN [[id number]] Collected: 01/02/23 1545     Order Status: Completed Specimen: Synovial Updated: 01/02/23 1836       Significant Indicator .       Source SYNO       Site Left Shoulder       Gram Stain Result Rare WBCs.  No organisms seen.        FLUID CULTURE W/GRAM STAIN [[id number]]       Order Status: No result Specimen: Body Fluid from Synovial Fluid       Blood Culture [[id number]] Collected: 12/26/22 2202     Order Status: Completed Specimen: Blood from Peripheral Updated: 12/31/22 2300       Significant Indicator NEG       Source BLD       Site PERIPHERAL       Culture Result No growth after 5 days of incubation.     Narrative:       1 of 2 for Blood Culture x 2 sites order. Per Hospital  Policy: Only change Specimen Src: to \"Line\" if specified by  physician order.  No site indicated     Blood Culture [[id number]] Collected: 12/26/22 2225     Order Status: Completed Specimen: Blood from Peripheral Updated: 12/31/22 2300       Significant Indicator NEG       Source BLD       Site PERIPHERAL       Culture Result No growth after 5 days of incubation.     Narrative:       2 of 2 blood culture x2  Sites order. Per Hospital Policy:  Only change Specimen Src: to \"Line\" if specified by physician  order.  Left Hand     CULTURE TISSUE W/ GRM STAIN [[id number]]  (Abnormal) Collected: " 12/28/22 1351     Order Status: Completed Specimen: Tissue Updated: 12/31/22 1205       Significant Indicator POS       Source TISS       Site Left anterior tibia       Culture Result -       Gram Stain Result Rare WBCs.  No organisms seen.          Culture Result Streptococcus agalactiae (Group B)  Light growth       Narrative:       Surgery Specimen     Anaerobic Culture [[id number]] Collected: 12/28/22 1351     Order Status: Completed Specimen: Tissue Updated: 12/31/22 1205       Significant Indicator NEG       Source TISS       Site Left anterior tibia       Culture Result No Anaerobes isolated.     Narrative:       Surgery Specimen     CULTURE TISSUE W/ GRM STAIN [[id number]]  (Abnormal) Collected: 12/28/22 1350     Order Status: Completed Specimen: Tissue Updated: 12/31/22 1201       Significant Indicator POS       Source TISS       Site Left knee lateral gutter       Culture Result -       Gram Stain Result Many WBCs.  No organisms seen.          Culture Result Streptococcus agalactiae (Group B)  Light growth       Narrative:       Surgery Specimen     Anaerobic Culture [[id number]] Collected: 12/28/22 1350     Order Status: Completed Specimen: Tissue Updated: 12/31/22 1201       Significant Indicator NEG       Source TISS       Site Left knee lateral gutter       Culture Result No Anaerobes isolated.     Narrative:       Surgery Specimen     CULTURE TISSUE W/ GRM STAIN [[id number]]  (Abnormal) Collected: 12/28/22 1432     Order Status: Completed Specimen: Tissue Updated: 12/31/22 1200       Significant Indicator POS       Source TISS       Site Left femur       Culture Result -       Gram Stain Result Rare WBCs.  No organisms seen.          Culture Result Streptococcus agalactiae (Group B)  Light growth       Narrative:       Surgery Specimen     Anaerobic Culture [[id number]] Collected: 12/28/22 1432     Order Status: Completed Specimen: Tissue Updated: 12/31/22 1200       Significant Indicator NEG       Source  TISS       Site Left femur       Culture Result No Anaerobes isolated.     Narrative:       Surgery Specimen     CULTURE TISSUE W/ GRM STAIN [[id number]]  (Abnormal) Collected: 12/28/22 1433     Order Status: Completed Specimen: Tissue Updated: 12/31/22 1159       Significant Indicator POS       Source TISS       Site Left Tibia       Culture Result -       Gram Stain Result Many WBCs.  No organisms seen.          Culture Result Streptococcus agalactiae (Group B)  Light growth       Narrative:       Surgery Specimen     Anaerobic Culture [[id number]] Collected: 12/28/22 1433     Order Status: Completed Specimen: Tissue Updated: 12/31/22 1159       Significant Indicator NEG       Source TISS       Site Left Tibia       Culture Result No Anaerobes isolated.     Narrative:       Surgery Specimen     CULTURE TISSUE W/ GRM STAIN [[id number]]  (Abnormal) Collected: 12/28/22 1349     Order Status: Completed Specimen: Tissue Updated: 12/31/22 1159       Significant Indicator POS       Source TISS       Site Left Knee Medial Gutter       Culture Result -       Gram Stain Result Rare WBCs.  No organisms seen.          Culture Result Streptococcus agalactiae (Group B)  Light growth       Narrative:       Surgery Specimen     Anaerobic Culture [[id number]] Collected: 12/28/22 1349     Order Status: Completed Specimen: Tissue Updated: 12/31/22 1159       Significant Indicator NEG       Source TISS       Site Left Knee Medial Gutter       Culture Result No Anaerobes isolated.     Narrative:       Surgery Specimen     FLUID CULTURE W/GRAM STAIN  Order: [id number]  Status: Final result     Visible to patient: No (inaccessible in MyChart)     Next appt: Today at 11:00 AM in Infectious Diseases (Mateus Shell A.P.R.N.)     Specimen Information: Synovial Fluid   0 Result Notes      Component 1 mo ago   Significant Indicator POS Positive (POS)    Source SYNO    Site Left Knee    Culture Result - Abnormal     Gram Stain Result  Abnormal    Many WBCs.   Rare Gram positive cocci.     Culture Result  Abnormal   Streptococcus agalactiae (Group B)   Light growth   This isolate does NOT demonstrate inducible Clindamycin   resistance in vitro.     Resulting Agency M        Susceptibility     Streptococcus agalactiae (group b)     [facility]     Clindamycin <=0.25 mcg/mL Sensitive     Daptomycin <=0.5 mcg/mL Sensitive     Penicillin <=0.03 mcg/mL Sensitive                     Impression/Assessment      1. Type 2 diabetes mellitus without complication, without long-term current use of insulin ([facility])        2. Streptococcal arthritis of left knee ([facility])        3. History of illicit drug use        4. Infection of prosthetic knee joint, subsequent encounter          April Alicia is a 62 y.o. female with a history of alcohol and cocaine abuse, cirrhosis, asthma, A-fib on apixaban, 2 diabetes, hypertension. Patient was admitted on July 2022 with a group B strep bacteremia and left knee prosthetic joint infection along with a left shoulder septic arthritis and some subdeltoid abscess.  Plan was for her to discharge to a skilled nursing facility and continue IV ceftriaxone for 6 weeks until 8/30/2022 due to some unknown reason patient only received 4 weeks of antibiotics.  Patient was readmitted on 12/26/2022 due to recurrent left knee pain and swelling.  She was taken back to the OR on 12/28 underwent explantation and placement of articulating  spacer, wound VAC.  Multiple OR cultures again growing group B Strep.   Underwent synovial fluid removal from the left shoulder on 1/2/2022 WBC 1051, 40% polys, 40% lymphs, not consistent with infection.  Streptococcus group B (penicillin sensitive). Plan at discharge with 6 weeks of IV antibiotics ceftriaxone 2 g daily till 2/7/2023. Orthopedic MD Garcia note recommending patient continue oral antibiotics for at least 1 year prior to reimplantation as she is undergone 2 I&D's in the past with 2 antibiotic spacers which  were both failed.  She is at high risk for failing stage II reimplantation and will be subject to amputation if failed again.  We will continue with oral Augmentin 500/ 125 mg twice daily for an additional year.  Patient will need to follow-up with ID clinic in 1 month with repeat CBC/CMP.  Wound education provided to patient and to report to ER    PLAN:   -  Patient comes in today with complaints of not feeling well. Today she reports increasing fatigue, trouble staying awake, and decreasing appetite. Her symptoms started about 2 weeks ago when notice increased swelling of her left knee with warmth despite treatment with chronic dosing of Augmentin.  I am concerned that she may have possibly failed outpatient treatment and needs additional services to rule out worsening infection. She has failed outpatient antibiotics in the past  - Patient escorted to ER   - Recommend repeat CT imaging of left leg/knee and starting  IV Zosyn  - Recommending consulting orthopedic and inpatient ID team (may possibly need joint aspiration)        Return visit: After ER visit. follow up with primary care physician for chronic medical problems      I have performed a physical exam,  updated ROS and plan today. I have reviewed previous images, labs, and provider notes.      EDITH Baez.    All Patients should seek medical re-evaluation or report to the ER for new, increasing or worsening symptoms. In some circumstances medical conditions can change from the initial evaluation and may require emergent medical re-evaluation. This includes but is not limited to chest pain, shortness of breath, atypical abdominal pain, atypical headache, ALOC, fever >101, low blood pressure, high respiratory rate (above 30), low oxygen saturation (below 90%), acute delirium, abnormal bleeding, inability to tolerate any intake, weakness on one side of the body, any worsened or concerning conditions.    Please note that this dictation was created  using voice recognition software. I have worked with technical experts from [facility] to optimize the interface.  I have made every reasonable attempt to correct obvious errors, but there may be errors of grammar and possibly content that I did not discover before finalizing the note.

## 2023-04-18 NOTE — ED PROVIDER NOTES
"  ER Provider Note    Scribed for Vandana Major M.D. by Michelle Razo. 4/18/2023  3:09 PM    Primary Care Provider: Karen Mcclure P.A.-C.    CHIEF COMPLAINT  Chief Complaint   Patient presents with    Knee Pain    Fatigue     EXTERNAL RECORDS REVIEWED  Review of patient's past medical records show that the patient was admitted here at [facility] for 15 days late December to early January for Strep B bacteremia and was found to have a septic knee with hardware infection. She had the hardware removed with an antibiotics spacer inserted by Dr. Wild Luz (Ortho). She was seen by ID and was started on Rocephin through PICC line until February 7, 2023. She was then seen by orthopedic surgery who recommended she was placed on a year of Augmentin, which she is currently on. She was ID on March 16, 2023 and was doing well at that time. She was seen at the ID office today complaining of fatigue, malaise, and left knee swelling/pain for the last two weeks. She was advised to come to the ER for a CT scan of her knee and be put on Zosyn as well as an orthopedic and ID consult.     HPI/ROS  OUTSIDE HISTORIAN(S):  None    LIMITATION TO HISTORY   None    April Alicia is a 63 y.o. female who presents to the Emergency Department at the request of her infectious disease APRN who saw her earlier today for worsening left knee pain and swelling onset one month ago.  Concern is that patient may have a recurrent septic arthritis.  The patient has a history of group B strep bacteremia as well as septic knee with hardware infection late December 2022. She has been treated with Rocephin until February 7, 2023 and has been started on a year of Augmentin since February 2023. She reports that her left knee has been \"hot\" and swelling up that started since she left rehab on March 12, 2023. She has not seen Dr. Wild Luz recently about this knee issue. She has not had any physical therapy since she left rehab a month ago, but " she has been taking her oral abx as prescribed. She states that she has had intermittent low grade fever for two weeks with a tmax of 101.8 °F one week ago. She has been experiencing generalized fatigue and malaise for 2 weeks. She reports having some upper abdominal pain over the last week and a half with some associated nausea and vomiting and diarrhea, last emesis was 3 days ago.  She has one episode of chest pain about a week ago.  None since.  No coughing.  No sore throat.  No headache.. She is no longer on any anticoagulants.  No shortness of breath or rash.  No history of appendectomy or cholecystomy. The patient does not drink alcohol, do drugs, or smoke cigarettes.  She states she has been clean off drugs for the last 10 years.  She is no longer on any blood thinners that she was taken off of her Eliquis several months ago.    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    Liver disease     Pneumonia 02/2020    Seizure disorder ([facility])        SURGICAL HISTORY  Past Surgical History:   Procedure Laterality Date    PB REVISE KNEE JOINT REPLACE,ALL PARTS Left 12/28/2022    Procedure: REVISION, TOTAL ARTHROPLASTY, KNEE, ALL COMPONENTS-LEFT;  Surgeon: Wild Luz M.D.;  Location: SURGERY [facility];  Service: Orthopedics    IRRIGATION & DEBRIDEMENT GENERAL Left 12/28/2022    Procedure: IRRIGATION AND DEBRIDEMENT, WOUND;  Surgeon: Wild Luz M.D.;  Location: SURGERY [facility];  Service: Orthopedics    PB REVISE KNEE JOINT REPLACE,ALL PARTS Left 7/19/2022    Procedure: REVISION, TOTAL ARTHROPLASTY,  KNEE, ALL COMPONENTS - ANTIBIOTIC SPACER;  Surgeon: Wild Luz M.D.;  Location: SURGERY [facility];  Service: Orthopedics    IRRIGATION & DEBRIDEMENT GENERAL Left 7/17/2022    Procedure: IRRIGATION AND DEBRIDEMENT, SHOULDER;  Surgeon: Obdulio Gray M.D.;  Location: SURGERY [facility];  Service: Orthopedics    PB TOTAL KNEE ARTHROPLASTY Left 8/24/2020    Procedure: ARTHROPLASTY, KNEE, TOTAL;  Surgeon: Víctor Faustin M.D.;  Location: SURGERY [facility];  Service: Orthopedics    KNEE ARTHROPLASTY TOTAL Right 2018    IRRIGATION & DEBRIDEMENT ORTHO Right 9/3/2017    Procedure: IRRIGATION & DEBRIDEMENT ORTHO, POLY EXCHANGE;  Surgeon: Jerome Russell M.D.;  Location: SURGERY [facility];  Service: Orthopedics    COLONOSCOPY WITH CLIPPING  10/28/2015    Procedure: COLONOSCOPY WITH CLIPPING;  Surgeon: Ruben Colon M.D.;  Location: ENDOSCOPY [facility];  Service:     COLONOSCOPY WITH SCLEROTHERAPY  10/28/2015    Procedure: COLONOSCOPY WITH SCLEROTHERAPY;  Surgeon: Ruben Colon M.D.;  Location: ENDOSCOPY [facility];  Service:     COLONOSCOPY WITH TATTOOING  10/28/2015    Procedure: COLONOSCOPY WITH TATTOOING;  Surgeon: Ruben Colon M.D.;  Location: ENDOSCOPY [facility];  Service:     GYN SURGERY  2003    hysteroscoopy    GYN SURGERY  1982    tubal ligation       FAMILY HISTORY  History reviewed. No pertinent family history.    SOCIAL HISTORY   reports that she quit smoking about 6 years ago. Her smoking use included cigarettes. She quit smokeless tobacco use about 1 years ago. She reports that she does not currently use alcohol. She reports that she does not currently use drugs.    CURRENT MEDICATIONS  Current Discharge Medication List        CONTINUE these medications which have NOT CHANGED    Details   pantoprazole (PROTONIX) 20 MG tablet Take 20 mg by mouth every day.      losartan (COZAAR) 100 MG Tab Take 100 mg by mouth every day.      Ascorbic Acid (VITAMIN C) 1000  MG Tab Take 1,000 mg by mouth every day.      vitamin e (VITAMIN E) 400 UNIT Cap Take 400 Units by mouth every day.      felodipine ER (PLENDIL) 5 MG TABLET SR 24 HR Take 5 mg by mouth every day.      psyllium (METAMUCIL) 58.12 % Pack Take 1 Packet by mouth every day.      REFRESH TEARS 0.5 % Solution Administer 2 Drops into both eyes as needed (dry eyes).      Cholecalciferol (VITAMIN D3) 50 MCG (2000 UT) Tab Take 2,000 Units by mouth every day.      amoxicillin-clavulanate (AUGMENTIN) 500-125 MG Tab Take 1 Tablet by mouth 2 times a day.  Qty: 90 Tablet, Refills: 1    Associated Diagnoses: Infection of prosthetic knee joint, subsequent encounter      furosemide (LASIX) 20 MG Tab Take 1 Tablet by mouth every day.  Qty: 30 Tablet, Refills: 0    Associated Diagnoses: Bilateral leg edema      potassium chloride (MICRO-K) 10 MEQ capsule Take 1 Capsule by mouth every day.  Qty: 30 Capsule, Refills: 0    Associated Diagnoses: Bilateral leg edema      ferrous sulfate 325 (65 Fe) MG tablet Take 325 mg by mouth every day.      magnesium oxide (MAG-OX) 400 MG Tab tablet Take 400 mg by mouth every day.      zinc sulfate (ZINCATE) 220 (50 Zn) MG Cap Take 1 Capsule by mouth every day.  Qty: 30 Capsule, Refills: 3    Associated Diagnoses: Protein-calorie malnutrition, severe (HCC)      sodium bicarbonate (SODIUM BICARBONATE) 650 MG Tab Take 2 Tablets by mouth 2 times a day.  Qty: 120 Tablet, Refills: 3    Associated Diagnoses: ELIZABETH (acute kidney injury) (HCC)      lactulose 20 GM/30ML Solution Take 15 mL by mouth every day.  Qty: 450 mL    Associated Diagnoses: Chronic hepatitis C without hepatic coma (HCC)      MULTAQ 400 MG Tab Take 1 Tablet by mouth 2 times a day with meals.  Qty: 60 Tablet    Associated Diagnoses: Migraine with aura and without status migrainosus, not intractable      atorvastatin (LIPITOR) 20 MG Tab Take 1 Tablet by mouth every evening.  Qty: 90 Tablet, Refills: 3    Associated Diagnoses: Controlled type 2  "diabetes mellitus with hyperglycemia, without long-term current use of insulin ([facility])      SPIRIVA HANDIHALER 18 MCG Cap Place 1 Capsule into inhaler and inhale every day.      gabapentin (NEURONTIN) 400 MG Cap Take 400 mg by mouth 2 times a day.             ALLERGIES  Nkda [no known drug allergy]    PHYSICAL EXAM  /66   Pulse 64   Temp 36.7 °C (98.1 °F) (Temporal)   Resp 17   Ht 1.715 m (5' 7.5\")   Wt 78.7 kg (173 lb 8 oz)   LMP 06/02/2008   SpO2 96%   BMI 26.77 kg/m²     Constitutional: Well developed, well nourished; No acute distress; Non-toxic appearance.   HENT: Normocephalic, atraumatic; Bilateral external ears normal; Oropharynx with dry mucous membranes; No erythema or exudates in the posterior oropharynx.  Eyes: PERRL, EOMI, Conjunctiva normal. No discharge.   Neck:  Supple, nontender midline; No stridor; No nuchal rigidity. JVD.   Lymphatic: No cervical lymphadenopathy noted.   Cardiovascular: Regular rate and rhythm without murmurs, rubs, or gallop.   Thorax & Lungs: No respiratory distress, breath sounds clear to auscultation bilaterally without wheezing, rales or rhonchi. Nontender chest wall. No crepitus or subcutaneous air  Abdomen: Soft, Tender to the mid epigastrium, right upper quadrant, and left lower quadrant; Bowel sounds normal. No obvious masses; No pulsatile masses; no rebound, guarding, or peritoneal signs.   Skin: Good color; warm and dry without rash or petechia.  Back: Nontender, No CVA tenderness.   Extremities: Distal radial, dorsalis pedis, posterior tibial pulses are equal bilaterally; right lower extremity: There is healed surgical scar over the right knee.  Left lower extremity: Mild swelling/effusion to the left knee with some subtle increase in warmth to the left knee when compared to the right.  No significant erythema.  No induration.  Patient is able to partially flex her knee without significant pain.  No pretibial edema.  2+ pedal pulses equal bilaterally.  Full " range of motion to digits.  Musculoskeletal: Good range of motion in all major joints. No tenderness to palpation or major deformities noted.   Neurologic: Alert & oriented x 4, clear speech.    DIAGNOSTIC STUDIES & PROCEDURES    Labs:   Results for orders placed or performed during the hospital encounter of 04/18/23   CBC With Differential   Result Value Ref Range    WBC 4.7 (L) 4.8 - 10.8 K/uL    RBC 4.12 (L) 4.20 - 5.40 M/uL    Hemoglobin 11.5 (L) 12.0 - 16.0 g/dL    Hematocrit 36.2 (L) 37.0 - 47.0 %    MCV 87.9 81.4 - 97.8 fL    MCH 27.9 27.0 - 33.0 pg    MCHC 31.8 (L) 33.6 - 35.0 g/dL    RDW 59.2 (H) 35.9 - 50.0 fL    Platelet Count 87 (L) 164 - 446 K/uL    MPV 10.1 9.0 - 12.9 fL    Neutrophils-Polys 59.10 44.00 - 72.00 %    Lymphocytes 28.30 22.00 - 41.00 %    Monocytes 7.90 0.00 - 13.40 %    Eosinophils 3.20 0.00 - 6.90 %    Basophils 1.10 0.00 - 1.80 %    Immature Granulocytes 0.40 0.00 - 0.90 %    Nucleated RBC 0.00 /100 WBC    Neutrophils (Absolute) 2.75 2.00 - 7.15 K/uL    Lymphs (Absolute) 1.32 1.00 - 4.80 K/uL    Monos (Absolute) 0.37 0.00 - 0.85 K/uL    Eos (Absolute) 0.15 0.00 - 0.51 K/uL    Baso (Absolute) 0.05 0.00 - 0.12 K/uL    Immature Granulocytes (abs) 0.02 0.00 - 0.11 K/uL    NRBC (Absolute) 0.00 K/uL   Comp Metabolic Panel   Result Value Ref Range    Sodium 139 135 - 145 mmol/L    Potassium 4.6 3.6 - 5.5 mmol/L    Chloride 108 96 - 112 mmol/L    Co2 24 20 - 33 mmol/L    Anion Gap 7.0 7.0 - 16.0    Glucose 102 (H) 65 - 99 mg/dL    Bun 20 8 - 22 mg/dL    Creatinine 1.02 0.50 - 1.40 mg/dL    Calcium 8.5 8.5 - 10.5 mg/dL    AST(SGOT) 31 12 - 45 U/L    ALT(SGPT) 25 2 - 50 U/L    Alkaline Phosphatase 258 (H) 30 - 99 U/L    Total Bilirubin 0.5 0.1 - 1.5 mg/dL    Albumin 2.8 (L) 3.2 - 4.9 g/dL    Total Protein 6.6 6.0 - 8.2 g/dL    Globulin 3.8 (H) 1.9 - 3.5 g/dL    A-G Ratio 0.7 g/dL   Lactic Acid   Result Value Ref Range    Lactic Acid 0.8 0.5 - 2.0 mmol/L   Urinalysis    Specimen: Urine   Result  Value Ref Range    Color Yellow     Character Clear     Specific Gravity 1.012 <1.035    Ph 8.0 5.0 - 8.0    Glucose Negative Negative mg/dL    Ketones Negative Negative mg/dL    Protein Negative Negative mg/dL    Bilirubin Negative Negative    Urobilinogen, Urine 0.2 Negative    Nitrite Negative Negative    Leukocyte Esterase Negative Negative    Occult Blood Negative Negative    Micro Urine Req see below    Lipase   Result Value Ref Range    Lipase 27 11 - 82 U/L   Sed Rate   Result Value Ref Range    Sed Rate Westergren 23 0 - 25 mm/hour   C Reactive Protein Quantitative (Non-Cardiac)   Result Value Ref Range    Stat C-Reactive Protein 0.30 0.00 - 0.75 mg/dL   CORRECTED CALCIUM   Result Value Ref Range    Correct Calcium 9.5 8.5 - 10.5 mg/dL   ESTIMATED GFR   Result Value Ref Range    GFR (CKD-EPI) 62 >60 mL/min/1.73 m 2   PROCALCITONIN   Result Value Ref Range    Procalcitonin 0.06 <0.25 ng/mL   POCT glucose device results   Result Value Ref Range    POC Glucose, Blood 119 (H) 65 - 99 mg/dL      All labs reviewed by me.    Radiology:   The attending Emergency Physician has independently interpreted the diagnostic imaging associated with this visit and is awaiting the final reading from the radiologist, which will be displayed below.    Preliminary interpretation is a follows: I reviewed the patient's chest x-ray.  No obvious pneumonia.    Radiologist interpretation:     CT-EXTREMITY, LOWER WITH RIGHT   Final Result      1.  RIGHT knee prosthesis is normally aligned.   2.  No gross fracture or focal bone destruction.   3.  No significant joint effusion or soft tissue gas.   4.  Artifact limits exam.      CT-ABDOMEN-PELVIS WITH   Final Result      1.  No acute abnormality in the abdomen or pelvis.   2.  Cirrhosis and portal hypertension, including splenomegaly and portosystemic collaterals. No hepatic mass lesions detected.   3.  Cardiomegaly.      US-RUQ   Final Result      1.  Hepatic steatosis and likely  cirrhosis.      2.  Dilatation of the main portal vein suggestive of portal hypertension.      3.  Slight thickening of gallbladder wall 3.5 mm which may be secondary to hypoproteinemia or hypoalbuminemia      DX-CHEST-PORTABLE (1 VIEW)   Final Result      1.  No acute cardiac or pulmonary abnormalities are identified.      2.  Old granulomatous disease.         Of note, I inadvertently ordered a right lower extremity CT scan when patient actually needed a left lower extremity CT scan.  Unfortunately, this right lower extremity CT scan was done and read by radiology.  Patient has already been given IV contrast.  I spoke with the [facility] internal medicine residents.  At this time we will wait a little while to give the patient another dye load to do the left lower extremity CT scan as it would likely not change the management at this time.  We will let the dye load filtered through the kidneys before we contrasting her.  The [facility] internal medicine resident said they will go ahead and order the left lower extremity CT scan for a little later today or early tomorrow morning.    COURSE & MEDICAL DECISION MAKING    ED Observation Status? No; Patient does not meet criteria for ED Observation.     INITIAL ASSESSMENT AND PLAN  Care Narrative: Patient presents to the ER at the request of her infectious disease APRN who saw her in the office earlier.  The patient has been experiencing some low-grade fever, chills and myalgias over the last 2 weeks.  Patient has history of a left septic arthritis which was diagnosed at the end of December 2022.  She was admitted here for 15 days and was ultimately discharged with IV Rocephin via PICC line until February 7.  She was then transition to Augmentin which she is supposed to be on for the next 1 year.  Patient said her knee was doing pretty well at rehab.  However, she got out of rehab a month ago and has not done any physical therapy.  Upon discharge from  rehab she noticed some swelling and discomfort in her left knee.  Over the last 2 weeks she has had some low-grade fevers and chills and myalgias.  She saw her infectious disease APRN today who was concerned patient might have a recurrent septic arthritis and sent her down for sepsis evaluation and for rule out septic arthritis.  Patient tells me she has been having some upper abdominal pain over the last week and a half.  She is also been having some nausea and vomiting.  Patient was tender in the midepigastrium and right upper quadrant.  In search for reason for patient's low-grade fever, I did a ultrasound of the right upper quadrant and she does have some mild thickening of the gallbladder wall.  This could be secondary to her ascites and cirrhosis.  Her alkaline phosphatase is a little higher today than it has been previously.  Over concern for possible early cholecystitis, consulted Dr. Oviedo, general surgeon on-call.  He has kindly consulted on patient.  With respect to her knee, I did not feel that placing a needle in this patient's knee at this time was prudent without speaking to orthopedics first.  After speaking with Dr. Luz, patient's orthopedic surgeon.  She agrees that we are not to do an arthrocentesis at this time.  She does not want to introduce infection.  She says it is really not can to  even if it is recurrently infected.  She says no arthrocentesis needed right now.  She recommends IV antibiotics.  She will see the patient in consultation in the morning.  At this time patient is not febrile.  She is not septic or toxic and that she has normal vital signs.  She is not hypotensive or tachycardic.  She was given IV Zosyn per ID recommendations.  Chest x-ray is negative.  Evidence for pneumonia.  Urine is clear.  No evidence for UTI.  CT scan of the abdomen pelvis is negative for any acute pathology.  Again, the gallbladder does have a slightly thickened gallbladder wall,  etiology uncertain at this time.  Patient will be hospitalized for further evaluation and management.  Orthopedics will be consulted in the morning.  General surgery has consulted tonight.  Patient will need an inpatient ID consult per the ID APRN recommendations earlier today.  I spoke with [facility] internal medicine residents on-call and they will kindly evaluate the patient for hospitalization.      3:09 PM - Patient seen and examined at bedside. Discussed plan of care, including labs and imaging. Patient agrees to the plan of care. The patient will be resuscitated with LR Bolus. Ordered for labs and imaging to evaluate her symptoms.     6:38 PM - Paged Dr. Luz (Orthopedic Surgery).     6:49 PM - I discussed the patient's case and the above findings with Dr. Luz (Orthopedic Surgery) who will see the patient tomorrow morning. She advises against arthrocentesis at this time and states that she would not even do it herself.  At this time she only recommend IV antibiotics at this time.  She agrees with proceeding with IV Zosyn since no arthrocentesis will be performed at this time.    6:50 PM  She will be treated with Zosyn 3.375 g in NS. Paged General Surgery.    6:52 PM - I discussed the patient's case and the above findings with Dr. Oviedo (General Surgery) agrees to evaluate the patient. Paged Hospitalist.     7:20 PM - I discussed the patient's case and the above findings with [facility] internal medicine resident on-call (Hospitalist) who agrees to evaluate the patient for hospitalization.     HYDRATION: Based on the patient's presentation of fever and sepsis the patient was given IV fluids. IV Hydration was used because oral hydration was not adequate alone. Upon recheck following hydration, the patient was improving.    ADDITIONAL PROBLEM LIST AND DISPOSITION  Problem #1: Left knee pain and swelling x1 month  Problem #2: Low-grade fevers, myalgias and fatigue for the last 2 weeks  Problem  #3: Upper abdominal pain x1 and half weeks  Problem #4: Vomiting and diarrhea for the last 1-1/2 weeks               DISPOSITION AND DISCUSSIONS  I have discussed management of the patient with the following physicians and CANDIDO's: Dr. Luz (Orthopedic Surgery), Dr. Oviedo (General Surgery), [facility] internal medicine (Hospitalist)     Discussion of management with other [facility] or appropriate source(s): None.    Escalation of care considered, and ultimately not performed: Consider arthrocentesis, but after discussion with the patient's orthopedic surgeon, she advises against putting a needle in the knee at this time.  In fact, she says she does not even plan on putting a needle in the knee at this time.  She recommends IV antibiotics.  No arthrocentesis advised at this time per orthopedic surgeon.    Barriers to care at this time, including but not limited to:  None known .     Decision tools and prescription drugs considered including, but not limited to: Medication modification IV Zosyn given per ID recommendations as per the clinic note from earlier today .    DISPOSITION:  Patient will be hospitalized by Dr. Tillman and the internal medicine residents on call in guarded condition.     FINAL IMPRESSION   1. Fever, unspecified fever cause Acute   2. Acute pain of left knee Acute   3. Epigastric pain Acute       Michelle ARGUETA (Scribsoledad), am scribing for, and in the presence of, Vandana Major M.D..    Electronically signed by: Michelle Razo (Scribe), 4/18/2023    Vandana ARGUETA M.D. personally performed the services described in this documentation, as scribed by Michelle Razo in my presence, and it is both accurate and complete.     The note accurately reflects work and decisions made by me.  Vandana Major M.D.  4/18/2023  11:59 PM

## 2023-04-18 NOTE — ED TRIAGE NOTES
Amb to triage sent by ID for further eval.  Patient reports R knee pain/swelling x 2 weeks, fatigue and decreased appetite  since yesterday.  Hx of group B strep to L knee, taking Augmentin since February.  Concern that she has failed outpatient treatment and for worsening infection.

## 2023-04-18 NOTE — ED NOTES
Pt ambulated to the room with personal walker. Changed into a gown and placed on monitor. Call light within reach. Pt provided with multiple warm blankets per request. Chart up for ERP.

## 2023-04-19 ENCOUNTER — APPOINTMENT (OUTPATIENT)
Dept: RADIOLOGY | Facility: MEDICAL CENTER | Age: 63
DRG: 560 | End: 2023-04-19
Attending: STUDENT IN AN ORGANIZED HEALTH CARE EDUCATION/TRAINING PROGRAM
Payer: MEDICAID

## 2023-04-19 PROBLEM — E66.3 OVERWEIGHT: Status: RESOLVED | Noted: 2023-04-18 | Resolved: 2023-04-19

## 2023-04-19 PROBLEM — R10.13 EPIGASTRIC PAIN: Status: RESOLVED | Noted: 2023-04-18 | Resolved: 2023-04-19

## 2023-04-19 PROBLEM — K82.8 THICKENING OF WALL OF GALLBLADDER: Status: ACTIVE | Noted: 2023-04-19

## 2023-04-19 LAB
ALBUMIN SERPL BCP-MCNC: 2.7 G/DL (ref 3.2–4.9)
ALBUMIN/GLOB SERPL: 0.8 G/DL
ALP SERPL-CCNC: 216 U/L (ref 30–99)
ALT SERPL-CCNC: 21 U/L (ref 2–50)
ANION GAP SERPL CALC-SCNC: 6 MMOL/L (ref 7–16)
AST SERPL-CCNC: 27 U/L (ref 12–45)
BASOPHILS # BLD AUTO: 1.4 % (ref 0–1.8)
BASOPHILS # BLD: 0.05 K/UL (ref 0–0.12)
BILIRUB SERPL-MCNC: 0.5 MG/DL (ref 0.1–1.5)
BUN SERPL-MCNC: 16 MG/DL (ref 8–22)
CALCIUM ALBUM COR SERPL-MCNC: 9.4 MG/DL (ref 8.5–10.5)
CALCIUM SERPL-MCNC: 8.4 MG/DL (ref 8.5–10.5)
CHLORIDE SERPL-SCNC: 112 MMOL/L (ref 96–112)
CHOLEST SERPL-MCNC: 82 MG/DL (ref 100–199)
CO2 SERPL-SCNC: 22 MMOL/L (ref 20–33)
CREAT SERPL-MCNC: 0.88 MG/DL (ref 0.5–1.4)
EOSINOPHIL # BLD AUTO: 0.16 K/UL (ref 0–0.51)
EOSINOPHIL NFR BLD: 4.4 % (ref 0–6.9)
ERYTHROCYTE [DISTWIDTH] IN BLOOD BY AUTOMATED COUNT: 58.3 FL (ref 35.9–50)
GFR SERPLBLD CREATININE-BSD FMLA CKD-EPI: 74 ML/MIN/1.73 M 2
GLOBULIN SER CALC-MCNC: 3.2 G/DL (ref 1.9–3.5)
GLUCOSE BLD STRIP.AUTO-MCNC: 147 MG/DL (ref 65–99)
GLUCOSE BLD STRIP.AUTO-MCNC: 151 MG/DL (ref 65–99)
GLUCOSE BLD STRIP.AUTO-MCNC: 89 MG/DL (ref 65–99)
GLUCOSE BLD STRIP.AUTO-MCNC: 91 MG/DL (ref 65–99)
GLUCOSE SERPL-MCNC: 89 MG/DL (ref 65–99)
HCT VFR BLD AUTO: 33.8 % (ref 37–47)
HDLC SERPL-MCNC: 45 MG/DL
HGB BLD-MCNC: 10.8 G/DL (ref 12–16)
IMM GRANULOCYTES # BLD AUTO: 0.01 K/UL (ref 0–0.11)
IMM GRANULOCYTES NFR BLD AUTO: 0.3 % (ref 0–0.9)
INR PPP: 1.36 (ref 0.87–1.13)
LDLC SERPL CALC-MCNC: 30 MG/DL
LYMPHOCYTES # BLD AUTO: 1.44 K/UL (ref 1–4.8)
LYMPHOCYTES NFR BLD: 39.2 % (ref 22–41)
MCH RBC QN AUTO: 27.7 PG (ref 27–33)
MCHC RBC AUTO-ENTMCNC: 32 G/DL (ref 33.6–35)
MCV RBC AUTO: 86.7 FL (ref 81.4–97.8)
MONOCYTES # BLD AUTO: 0.35 K/UL (ref 0–0.85)
MONOCYTES NFR BLD AUTO: 9.5 % (ref 0–13.4)
NEUTROPHILS # BLD AUTO: 1.66 K/UL (ref 2–7.15)
NEUTROPHILS NFR BLD: 45.2 % (ref 44–72)
NRBC # BLD AUTO: 0 K/UL
NRBC BLD-RTO: 0 /100 WBC
PLATELET # BLD AUTO: 85 K/UL (ref 164–446)
PMV BLD AUTO: 10.5 FL (ref 9–12.9)
POTASSIUM SERPL-SCNC: 4.7 MMOL/L (ref 3.6–5.5)
PROT SERPL-MCNC: 5.9 G/DL (ref 6–8.2)
PROTHROMBIN TIME: 16.6 SEC (ref 12–14.6)
RBC # BLD AUTO: 3.9 M/UL (ref 4.2–5.4)
SCCMEC + MECA PNL NOSE NAA+PROBE: NEGATIVE
SODIUM SERPL-SCNC: 140 MMOL/L (ref 135–145)
TRIGL SERPL-MCNC: 37 MG/DL (ref 0–149)
WBC # BLD AUTO: 3.7 K/UL (ref 4.8–10.8)

## 2023-04-19 PROCEDURE — 99255 IP/OBS CONSLTJ NEW/EST HI 80: CPT | Performed by: INTERNAL MEDICINE

## 2023-04-19 PROCEDURE — 700105 HCHG RX REV CODE 258: Performed by: STUDENT IN AN ORGANIZED HEALTH CARE EDUCATION/TRAINING PROGRAM

## 2023-04-19 PROCEDURE — 96366 THER/PROPH/DIAG IV INF ADDON: CPT

## 2023-04-19 PROCEDURE — 700111 HCHG RX REV CODE 636 W/ 250 OVERRIDE (IP): Performed by: STUDENT IN AN ORGANIZED HEALTH CARE EDUCATION/TRAINING PROGRAM

## 2023-04-19 PROCEDURE — 99214 OFFICE O/P EST MOD 30 MIN: CPT | Performed by: NURSE PRACTITIONER

## 2023-04-19 PROCEDURE — 700102 HCHG RX REV CODE 250 W/ 637 OVERRIDE(OP): Performed by: STUDENT IN AN ORGANIZED HEALTH CARE EDUCATION/TRAINING PROGRAM

## 2023-04-19 PROCEDURE — G0378 HOSPITAL OBSERVATION PER HR: HCPCS

## 2023-04-19 PROCEDURE — 85025 COMPLETE CBC W/AUTO DIFF WBC: CPT

## 2023-04-19 PROCEDURE — 80053 COMPREHEN METABOLIC PANEL: CPT

## 2023-04-19 PROCEDURE — 80061 LIPID PANEL: CPT

## 2023-04-19 PROCEDURE — 99232 SBSQ HOSP IP/OBS MODERATE 35: CPT | Performed by: NURSE PRACTITIONER

## 2023-04-19 PROCEDURE — 700117 HCHG RX CONTRAST REV CODE 255: Performed by: STUDENT IN AN ORGANIZED HEALTH CARE EDUCATION/TRAINING PROGRAM

## 2023-04-19 PROCEDURE — 73701 CT LOWER EXTREMITY W/DYE: CPT | Mod: LT

## 2023-04-19 PROCEDURE — 96372 THER/PROPH/DIAG INJ SC/IM: CPT

## 2023-04-19 PROCEDURE — 36415 COLL VENOUS BLD VENIPUNCTURE: CPT

## 2023-04-19 PROCEDURE — 85610 PROTHROMBIN TIME: CPT

## 2023-04-19 PROCEDURE — A9270 NON-COVERED ITEM OR SERVICE: HCPCS | Performed by: STUDENT IN AN ORGANIZED HEALTH CARE EDUCATION/TRAINING PROGRAM

## 2023-04-19 PROCEDURE — 82962 GLUCOSE BLOOD TEST: CPT

## 2023-04-19 RX ORDER — LOSARTAN POTASSIUM 50 MG/1
100 TABLET ORAL DAILY
Status: DISCONTINUED | OUTPATIENT
Start: 2023-04-20 | End: 2023-04-23 | Stop reason: HOSPADM

## 2023-04-19 RX ADMIN — PIPERACILLIN AND TAZOBACTAM 3.38 G: 3; .375 INJECTION, POWDER, LYOPHILIZED, FOR SOLUTION INTRAVENOUS; PARENTERAL at 20:40

## 2023-04-19 RX ADMIN — OXYCODONE 5 MG: 5 TABLET ORAL at 10:35

## 2023-04-19 RX ADMIN — DOCUSATE SODIUM 50 MG AND SENNOSIDES 8.6 MG 2 TABLET: 8.6; 5 TABLET, FILM COATED ORAL at 05:56

## 2023-04-19 RX ADMIN — ACETAMINOPHEN 650 MG: 325 TABLET, FILM COATED ORAL at 02:02

## 2023-04-19 RX ADMIN — OXYCODONE 5 MG: 5 TABLET ORAL at 21:46

## 2023-04-19 RX ADMIN — PIPERACILLIN AND TAZOBACTAM 3.38 G: 3; .375 INJECTION, POWDER, LYOPHILIZED, FOR SOLUTION INTRAVENOUS; PARENTERAL at 13:58

## 2023-04-19 RX ADMIN — PIPERACILLIN AND TAZOBACTAM 3.38 G: 3; .375 INJECTION, POWDER, LYOPHILIZED, FOR SOLUTION INTRAVENOUS; PARENTERAL at 07:57

## 2023-04-19 RX ADMIN — INSULIN HUMAN 1 UNITS: 100 INJECTION, SOLUTION PARENTERAL at 10:38

## 2023-04-19 RX ADMIN — OXYCODONE 5 MG: 5 TABLET ORAL at 18:32

## 2023-04-19 RX ADMIN — IOHEXOL 75 ML: 350 INJECTION, SOLUTION INTRAVENOUS at 16:15

## 2023-04-19 RX ADMIN — CELECOXIB 200 MG: 200 CAPSULE ORAL at 05:56

## 2023-04-19 RX ADMIN — CELECOXIB 200 MG: 200 CAPSULE ORAL at 17:23

## 2023-04-19 RX ADMIN — ATORVASTATIN CALCIUM 20 MG: 20 TABLET, FILM COATED ORAL at 20:35

## 2023-04-19 RX ADMIN — PIPERACILLIN AND TAZOBACTAM 3.38 G: 3; .375 INJECTION, POWDER, LYOPHILIZED, FOR SOLUTION INTRAVENOUS; PARENTERAL at 04:15

## 2023-04-19 RX ADMIN — DOCUSATE SODIUM 50 MG AND SENNOSIDES 8.6 MG 2 TABLET: 8.6; 5 TABLET, FILM COATED ORAL at 17:23

## 2023-04-19 ASSESSMENT — ENCOUNTER SYMPTOMS
SORE THROAT: 0
WHEEZING: 0
DIARRHEA: 0
ABDOMINAL PAIN: 1
COUGH: 0
NAUSEA: 1
WEAKNESS: 0
CHILLS: 1
PALPITATIONS: 0
DEPRESSION: 0
HEADACHES: 0
VOMITING: 0
FEVER: 1
MYALGIAS: 1
INSOMNIA: 0
EYE PAIN: 0
SHORTNESS OF BREATH: 0
DIZZINESS: 0

## 2023-04-19 ASSESSMENT — COGNITIVE AND FUNCTIONAL STATUS - GENERAL
DAILY ACTIVITIY SCORE: 24
SUGGESTED CMS G CODE MODIFIER MOBILITY: CH
MOBILITY SCORE: 24
SUGGESTED CMS G CODE MODIFIER DAILY ACTIVITY: CH

## 2023-04-19 ASSESSMENT — PATIENT HEALTH QUESTIONNAIRE - PHQ9
SUM OF ALL RESPONSES TO PHQ9 QUESTIONS 1 AND 2: 0
2. FEELING DOWN, DEPRESSED, IRRITABLE, OR HOPELESS: NOT AT ALL
1. LITTLE INTEREST OR PLEASURE IN DOING THINGS: NOT AT ALL

## 2023-04-19 ASSESSMENT — COPD QUESTIONNAIRES
COPD SCREENING SCORE: 4
DO YOU EVER COUGH UP ANY MUCUS OR PHLEGM?: NO/ONLY WITH OCCASIONAL COLDS OR INFECTIONS
DURING THE PAST 4 WEEKS HOW MUCH DID YOU FEEL SHORT OF BREATH: NONE/LITTLE OF THE TIME
HAVE YOU SMOKED AT LEAST 100 CIGARETTES IN YOUR ENTIRE LIFE: YES

## 2023-04-19 ASSESSMENT — PAIN DESCRIPTION - PAIN TYPE
TYPE: ACUTE PAIN

## 2023-04-19 NOTE — ASSESSMENT & PLAN NOTE
>>ASSESSMENT AND PLAN FOR CIRRHOSIS (HCC) WRITTEN ON 4/22/2023  1:14 PM BY SEDA GALICIA D.O.    -Suspect secondary to past chronic alcohol drinking.    -CT A/P demonstrated cirrhosis, portal hypertension, splenomegaly, and portal systemic collaterals.   -Alk phos elevated.  -AST/ALT and tbili WNL.  -Pending INR, unable to calculate MELD.    4/21 INR 1.36  hida neg, abd soreness, monitor  Left >right  Ct neg for effusion or fx  Improving, chronic pain  Pt/ot cleared for dc home, no Dme    4/22 discharge home in a.m. if able to ambulate  Reports mild recurrent pain with abdominal, monitor oral intake

## 2023-04-19 NOTE — PROGRESS NOTES
Pt admitted to room T216 from Y66 at 2050.  Pt  a&o x4. Complaints of bilat knee pain. Ambulating with SBA and FWW.  WALKER.  On RA. Respirations equal and unlabored. Oriented to room call light and vitals frequency.  Reviewed plan of care: pain management, diet, fall precautions, skin care, labs,and pain management with patient. Visiting hours discussed. Admit profile done. Passed RN bedside swallow evaluation without s/sx of aspiration. All questions answered. White board updated. Verbalized understanding and agrees. Able to make needs known.

## 2023-04-19 NOTE — CONSULTS
"  DATE OF CONSULTATION:  4/18/2023     REFERRING PHYSICIAN:   Vandana Major M.D.     CONSULTING PHYSICIAN:  Evangelist Oviedo M.D.     REASON FOR CONSULTATION:  I have been asked by  to see the patient in surgical consultation for evaluation of upper abdominal pain.    HISTORY OF PRESENT ILLNESS: The patient is a 63 year-old woman with a complex medical history including cirrhosis and a left knee prosthetic joint infection who presents to the Emergency Department with an approximately one and a half to two - week history of  upper midline abdominal pain, malaise, fatigue, left knee warmth, and subjective fevers. She was seen in infectious disease clinic today and started on Zosyn and advised to come to the ER for imagining of her left knee. Due to her complaints of abdominal pain a CT scan of her abdomen and pelvis was completed and ACS was consulted. She endorses feeling a \"ball\" in her upper abdomen that has been present for the past week and half, this has been associated with nausea and emesis although this has improved over the past few days. She also notes diarrhea over this period of time as well. She has been admitted to the Medicine service for IV antibiotics. On review of her CT scan her liver appears nodular consistent with cirrhosis; there are significant collaterals present in the anterior abdominal wall; her gallbladder is contracted with a mildly thickened wall but there is no adjacent stranding or pericholecystic fluid; and there is no evidence of a ventral hernia. Ultrasound of the right-upper quadrant completed in the demonstrates mild thickening of the gallbladder wall without pericholecystic fluid or evidence of cholelithiasis.    PAST MEDICAL HISTORY:  has a past medical history of Arrhythmia, Arthritis, ASTHMA, Blood clotting disorder (HCC) (2018), Dental disorder, Diabetes, Emphysema of lung (HCC), Heart abnormality, Heart burn, Heart valve disease, Hypertension, Infection (9/4/2017), " Infection (2018), Liver disease, Pneumonia (02/2020), and Seizure disorder (HCC).    She has no past medical history of CAD (coronary artery disease), COPD, Psychiatric disorder, or Stroke (HCC).    PAST SURGICAL HISTORY:  has a past surgical history that includes gyn surgery (1982); gyn surgery (2003); colonoscopy with clipping (10/28/2015); colonoscopy with sclerotherapy (10/28/2015); colonoscopy with tattooing (10/28/2015); irrigation & debridement ortho (Right, 9/3/2017); knee arthroplasty total (Right, 2018); pr total knee arthroplasty (Left, 8/24/2020); irrigation & debridement general (Left, 7/17/2022); pr revise knee joint replace,all parts (Left, 7/19/2022); pr revise knee joint replace,all parts (Left, 12/28/2022); and irrigation & debridement general (Left, 12/28/2022).    ALLERGIES:   Allergies   Allergen Reactions    Nkda [No Known Drug Allergy]        CURRENT MEDICATIONS:    Home Medications       Reviewed by Demetrius Canales (Pharmacy Tech) on 04/18/23 at 1932  Med List Status: Complete     Medication Last Dose Status   amoxicillin-clavulanate (AUGMENTIN) 500-125 MG Tab 4/18/2023 Active   Ascorbic Acid (VITAMIN C) 1000 MG Tab 4/18/2023 Active   atorvastatin (LIPITOR) 20 MG Tab 4/17/2023 Active   Cholecalciferol (VITAMIN D3) 50 MCG (2000 UT) Tab 4/18/2023 Active   felodipine ER (PLENDIL) 5 MG TABLET SR 24 HR 4/18/2023 Active   ferrous sulfate 325 (65 Fe) MG tablet 4/18/2023 Active   furosemide (LASIX) 20 MG Tab 4/18/2023 Active   gabapentin (NEURONTIN) 400 MG Cap 4/18/2023 Active   lactulose 20 GM/30ML Solution 4/18/2023 Active   losartan (COZAAR) 100 MG Tab 4/18/2023 Active   magnesium oxide (MAG-OX) 400 MG Tab tablet 4/18/2023 Active   MULTAQ 400 MG Tab 4/18/2023 Active   pantoprazole (PROTONIX) 20 MG tablet 4/18/2023 Active   potassium chloride (MICRO-K) 10 MEQ capsule 4/18/2023 Active   psyllium (METAMUCIL) 58.12 % Pack 4/18/2023 Active   REFRESH TEARS 0.5 % Solution 4/18/2023 Active   sodium  bicarbonate (SODIUM BICARBONATE) 650 MG Tab 4/18/2023 Active   SPIRIVA HANDIHALER 18 MCG Cap 4/18/2023 Active   vitamin e (VITAMIN E) 400 UNIT Cap 4/18/2023 Active   zinc sulfate (ZINCATE) 220 (50 Zn) MG Cap 4/18/2023 Active                    FAMILY HISTORY: family history is not on file.    SOCIAL HISTORY:  reports that she quit smoking about 6 years ago. Her smoking use included cigarettes. She quit smokeless tobacco use about 1 years ago. She reports that she does not currently use alcohol. She reports that she does not currently use drugs.    REVIEW OF SYSTEMS: Comprehensive review of systems is negative with the exception of the aforementioned HPI, PMH, and PSH bullets in accordance with CMS guidelines.    PHYSICAL EXAMINATION:    Physical Exam  Vitals and nursing note reviewed.   Constitutional:       General: She is not in acute distress.     Appearance: She is not ill-appearing.   HENT:      Head: Normocephalic and atraumatic.      Right Ear: External ear normal.      Left Ear: External ear normal.      Nose: Nose normal.      Mouth/Throat:      Mouth: Mucous membranes are moist.      Pharynx: Oropharynx is clear.   Eyes:      General: No scleral icterus.     Pupils: Pupils are equal, round, and reactive to light.   Cardiovascular:      Rate and Rhythm: Normal rate and regular rhythm.   Pulmonary:      Effort: Pulmonary effort is normal. No respiratory distress.   Abdominal:      General: There is no distension.      Palpations: Abdomen is soft.      Tenderness: There is abdominal tenderness in the epigastric area. There is no guarding or rebound.      Hernia: No hernia is present.      Comments: Capute medusae present.   Genitourinary:     Comments: Deferred.  Musculoskeletal:      Cervical back: Normal range of motion and neck supple.      Left knee: Swelling present. Tenderness present.   Skin:     General: Skin is warm and dry.      Capillary Refill: Capillary refill takes less than 2 seconds.       Coloration: Skin is not jaundiced.   Neurological:      General: No focal deficit present.      Mental Status: She is alert and oriented to person, place, and time.   Psychiatric:         Mood and Affect: Mood normal.         Behavior: Behavior normal.       LABORATORY VALUES:   Recent Labs     04/18/23  1538   WBC 4.7*   RBC 4.12*   HEMOGLOBIN 11.5*   HEMATOCRIT 36.2*   MCV 87.9   MCH 27.9   MCHC 31.8*   RDW 59.2*   PLATELETCT 87*   MPV 10.1     Recent Labs     04/18/23  1538   SODIUM 139   POTASSIUM 4.6   CHLORIDE 108   CO2 24   GLUCOSE 102*   BUN 20   CREATININE 1.02   CALCIUM 8.5     Recent Labs     04/18/23  1538   ASTSGOT 31   ALTSGPT 25   TBILIRUBIN 0.5   ALKPHOSPHAT 258*   GLOBULIN 3.8*            IMAGING:   CT-EXTREMITY, LOWER WITH RIGHT   Final Result      1.  RIGHT knee prosthesis is normally aligned.   2.  No gross fracture or focal bone destruction.   3.  No significant joint effusion or soft tissue gas.   4.  Artifact limits exam.      CT-ABDOMEN-PELVIS WITH   Final Result      1.  No acute abnormality in the abdomen or pelvis.   2.  Cirrhosis and portal hypertension, including splenomegaly and portosystemic collaterals. No hepatic mass lesions detected.   3.  Cardiomegaly.      US-RUQ   Final Result      1.  Hepatic steatosis and likely cirrhosis.      2.  Dilatation of the main portal vein suggestive of portal hypertension.      3.  Slight thickening of gallbladder wall 3.5 mm which may be secondary to hypoproteinemia or hypoalbuminemia      DX-CHEST-PORTABLE (1 VIEW)   Final Result      1.  No acute cardiac or pulmonary abnormalities are identified.      2.  Old granulomatous disease.          ASSESSMENT AND PLAN:     Epigastric pain- (present on admission)  Assessment & Plan  1.5 weeks of abdominal pain associated with diarrhea, nausea, and vomiting.  Labs with elevation of alk phos, no leukocytosis.  Imaging with mild thickening of the gallbladder wall; favor cirrhosis as etiology; no  pericholecystic fluid or evidence of gallbladder inflammation; no cholelithiasis.  No indications for acute surgical intervention at this time.  Concern for cholecystitis low. Can obtain HIDA scan if persistent concern.  Unable to calculate MELD without INR however the presence of multiple large anterior abdominal collaterals makes abdominal surgery perilous in this patient.      DISPOSITION: Medical evaluation and admission. The patient was admitted to the Medical Service prior to surgical consultation. [facility] Service will follow.     ____________________________________     Evangelist Oviedo M.D.    DD: 4/18/2023  8:23 PM    ACS NSQIP Surgical Risk Calculator

## 2023-04-19 NOTE — ASSESSMENT & PLAN NOTE
-History of rheumatic fever at age 26.  -ECHO on 7/2022 demonstrated EF 60%, with moderate concentric left ventricular hypertrophy, mild mitral regurgitation.

## 2023-04-19 NOTE — CONSULTS
INFECTIOUS DISEASES INPATIENT CONSULT NOTE     Date of Service: 4/19/2023    Consult Requested By: Anitha De Anda D.N.P.    Reason for Consultation: infected knee artroplasty    History of Present Illness:   April Alicia is a 63 y.o. diabetic female admitted 4/18/2023 for abd pain X almost 2 weeks, knee pain and swelling. On Augmentin for suppression of chronic infection left knee arthroplasty (group B strep)  known history alcohol and cocaine abuse, cirrhosis, portal hypertension, atrial fibrillation on apixaban,  Previously admitted July 2022 with a group B strep bacteremia, left knee prosthetic joint infection,left shoulder septic arthritis and subdeltoid abscess.  Plan was for her to discharge to a skilled nursing facility and continue IV ceftriaxone for 6 weeks until 8/30/2022; due to some unknown reason patient only received 4 weeks of antibiotics.  Patient was readmitted on 12/26/2022 due to recurrent left knee pain and swelling  s/p OR on 12/28 underwent explantation and placement of articulating spacer, wound VAC.  Multiple OR cultures again growing group B Strep. Discharged with 6 weeks of IV antibiotics ceftriaxone 2 g daily till 2/7/2023 followed by oral antibiotics for 1 year prior to reimplantation (status post 2 I's and D's in the past with 2 antibiotic spacers which were both failed). Seen in ID clinic 4/18 with c/o malaise, fatigue, drowsiness, decreased appetite, and left knee pain/swelling for approximately 2 weeks.    In the ED, afebrile +leukopenia 4.7, platelet count 87.  ESR 23.  Elevated  Lactic acid 0.8.    UA neg.  CRP 0.3.(normal)  CT of the abdomen/pelvis demonstrated cirrhosis and portal hypertension, including splenomegaly and portal systemic collaterals; cardiomegaly.    Orthopedics consulted and recommends IV antibiotics at this point.  Dr. Oviedo from general surgery was contacted to evaluate patient's abdominal pain  Currently on Zosyn  CT of right not left knee  done  Infectious Diseases consulted for antibiotic recommendation and management      Review Of Systems:  Knee and abd pain  All other systems are reviewed and are negative     PMH:   Past Medical History:   Diagnosis Date    Arrhythmia     Arthritis     OA in knees    ASTHMA     Blood clotting disorder (HCC) 2018    right leg blood clot    Dental disorder     upper and lower dentures    Diabetes     Emphysema of lung (HCC)     Heart abnormality     leakage in left valve    Heart burn     left knee    Heart valve disease     Hypertension     Infection 9/4/2017    Infection 2018    Right knee after total knee    Liver disease     Pneumonia 02/2020    Seizure disorder ([facility])          PSH:  Past Surgical History:   Procedure Laterality Date    PB REVISE KNEE JOINT REPLACE,ALL PARTS Left 12/28/2022    Procedure: REVISION, TOTAL ARTHROPLASTY, KNEE, ALL COMPONENTS-LEFT;  Surgeon: Wild Luz M.D.;  Location: [facility];  Service: Orthopedics    IRRIGATION & DEBRIDEMENT GENERAL Left 12/28/2022    Procedure: IRRIGATION AND DEBRIDEMENT, WOUND;  Surgeon: Wild Luz M.D.;  Location: [facility];  Service: Orthopedics    PB REVISE KNEE JOINT REPLACE,ALL PARTS Left 7/19/2022    Procedure: REVISION, TOTAL ARTHROPLASTY, KNEE, ALL COMPONENTS - ANTIBIOTIC SPACER;  Surgeon: Wild Luz M.D.;  Location: [facility];  Service: Orthopedics    IRRIGATION & DEBRIDEMENT GENERAL Left 7/17/2022    Procedure: IRRIGATION AND DEBRIDEMENT, SHOULDER;  Surgeon: Obdulio Gray M.D.;  Location: [facility];  Service: Orthopedics    PB TOTAL KNEE ARTHROPLASTY Left 8/24/2020    Procedure: ARTHROPLASTY, KNEE, TOTAL;  Surgeon: Víctor Faustin M.D.;  Location: [facility];  Service: Orthopedics    KNEE ARTHROPLASTY TOTAL Right 2018    IRRIGATION & DEBRIDEMENT ORTHO Right 9/3/2017    Procedure: IRRIGATION & DEBRIDEMENT ORTHO, POLY EXCHANGE;  Surgeon: Jerome Russell M.D.;  [facility];  Service: Orthopedics    COLONOSCOPY WITH CLIPPING  10/28/2015    Procedure: COLONOSCOPY WITH CLIPPING;  Surgeon: Ruben Colon M.D.;  Location: ENDOSCOPY [facility];  Service:     COLONOSCOPY WITH SCLEROTHERAPY  10/28/2015    Procedure: COLONOSCOPY WITH SCLEROTHERAPY;  Surgeon: Ruben Colon M.D.;  Location: ENDOSCOPY [facility];  Service:     COLONOSCOPY WITH TATTOOING  10/28/2015    Procedure: COLONOSCOPY WITH TATTOOING;  Surgeon: Ruben Colon M.D.;  Location: ENDOSCOPY [facility];  Service:     GYN SURGERY      hysteroscoopy    GYN SURGERY      tubal ligation       FAMILY HX:  History reviewed. No pertinent family history.    SOCIAL HX:  Social History     Socioeconomic History    Marital status: Single     Spouse name: Not on file    Number of children: Not on file    Years of education: Not on file    Highest education level: Not on file   Occupational History    Not on file   Tobacco Use    Smoking status: Former     Types: Cigarettes     Quit date: 2016     Years since quittin.3    Smokeless tobacco: Former     Quit date: 2021    Tobacco comments:     a few a day when I can   Vaping Use    Vaping Use: Never used   Substance and Sexual Activity    Alcohol use: Not Currently    Drug use: Not Currently    Sexual activity: Not on file   Other Topics Concern    Not on file   Social History Narrative    Not on file     Social Determinants of Health     Financial Resource Strain: Not on file   Food Insecurity: Not on file   Transportation Needs: Not on file   Physical Activity: Not on file   Stress: Not on file   Social Connections: Not on file   Intimate Partner Violence: Not on file   Housing Stability: Not on file     Social History     Tobacco Use   Smoking Status Former    Types: Cigarettes    Quit date: 2016    Years since quittin.3   Smokeless Tobacco Former    Quit date: 2021   Tobacco Comments    a few a day when I can      Social History     Substance and Sexual Activity   Alcohol Use Not Currently       Allergies/Intolerances:  Allergies   Allergen Reactions    Nkda [No Known Drug Allergy]          Other Current Medications:    Current Facility-Administered Medications:     [COMPLETED] piperacillin-tazobactam (Zosyn) 3.375 g in  mL IVPB, 3.375 g, Intravenous, Once, Stopped at 04/19/23 0445 **AND** piperacillin-tazobactam (Zosyn) 3.375 g in  mL IVPB, 3.375 g, Intravenous, Q8HRS, Sonia Shahid M.D., Stopped at 04/19/23 1157    senna-docusate (PERICOLACE or SENOKOT S) 8.6-50 MG per tablet 2 Tablet, 2 Tablet, Oral, BID, 2 Tablet at 04/19/23 0556 **AND** polyethylene glycol/lytes (MIRALAX) PACKET 1 Packet, 1 Packet, Oral, QDAY PRN **AND** magnesium hydroxide (MILK OF MAGNESIA) suspension 30 mL, 30 mL, Oral, QDAY PRN **AND** bisacodyl (DULCOLAX) suppository 10 mg, 10 mg, Rectal, QDAY PRN, Sonia Shahid M.D.    Respiratory Therapy Consult, , Nebulization, Continuous RT, Sonia Shahid M.D.    acetaminophen (Tylenol) tablet 650 mg, 650 mg, Oral, Q6HRS PRN, Sonia Shahid M.D., 650 mg at 04/19/23 0202    hydrALAZINE (APRESOLINE) injection 10 mg, 10 mg, Intravenous, Q4HRS PRN, Sonia Shahid M.D.    ondansetron (ZOFRAN) syringe/vial injection 4 mg, 4 mg, Intravenous, Q4HRS PRN, Sonia Shahid M.D.    ondansetron (ZOFRAN ODT) dispertab 4 mg, 4 mg, Oral, Q4HRS PRN, Sonia Shahid M.D.    promethazine (PHENERGAN) tablet 12.5-25 mg, 12.5-25 mg, Oral, Q4HRS PRN, Sonia Shahid M.D.    promethazine (PHENERGAN) suppository 12.5-25 mg, 12.5-25 mg, Rectal, Q4HRS PRN, Sonia Shahid M.D.    prochlorperazine (COMPAZINE) injection 5-10 mg, 5-10 mg, Intravenous, Q4HRS PRN, Sonia Shahid M.D.    insulin regular (HumuLIN R,NovoLIN R) injection, 1-6 Units, Subcutaneous, 4X/DAY ACHS, 1 Units at 04/19/23 1038 **AND** POC  "blood glucose manual result, , , Q AC AND BEDTIME(S) **AND** NOTIFY MD and PharmD, , , Once **AND** Administer 20 grams of glucose (approximately 8 ounces of fruit juice) every 15 minutes PRN FSBG less than 70 mg/dL, , , PRN **AND** dextrose 10 % BOLUS 25 g, 25 g, Intravenous, Q15 MIN PRN, Sonia Shahid M.D.    Pharmacy Consult Request ...Pain Management Review 1 Each, 1 Each, Other, PHARMACY TO DOSE, Sonia Shahid M.D.    celecoxib (CELEBREX) capsule 200 mg, 200 mg, Oral, BID, 200 mg at 23 0556 **FOLLOWED BY** [START ON 2023] celecoxib (CELEBREX) capsule 200 mg, 200 mg, Oral, BID PRN, Sonia Shahid M.D.    oxyCODONE immediate-release (ROXICODONE) tablet 2.5 mg, 2.5 mg, Oral, Q3HRS PRN **OR** oxyCODONE immediate-release (ROXICODONE) tablet 5 mg, 5 mg, Oral, Q3HRS PRN, 5 mg at 23 1035 **OR** HYDROmorphone (Dilaudid) injection 0.25 mg, 0.25 mg, Intravenous, Q3HRS PRN, Sonia Shahid M.D.    atorvastatin (LIPITOR) tablet 20 mg, 20 mg, Oral, Nightly, Sonia Shahid M.D., 20 mg at 238      Most Recent Vital Signs:  /66   Pulse 60   Temp 36.9 °C (98.5 °F) (Temporal)   Resp 18   Ht 1.727 m (5' 8\")   Wt 61.1 kg (134 lb 11.2 oz)   LMP 2008   SpO2 94%   BMI 20.48 kg/m²   Temp  Av.8 °C (98.2 °F)  Min: 36.6 °C (97.8 °F)  Max: 37.1 °C (98.8 °F)    Physical Exam:  General:  no acute distress  HEENT: NCAT, PERRLA, sclera anicteric, PERRL, EOMI  Neck: supple  Chest: CTAB, unlabored.  Cardiac: RRR,  no m/r/g   Abdomen: + bowel sounds, soft,  mild tender, non-distended  Extremities: No cyanosis, clubbing query effusion left-no erythema bilateral well-healed scars  Skin: no rashes   Neuro: somnolent  Psych: Unable to assess    Pertinent Lab Results:  Recent Labs     23  1538 23  0336   WBC 4.7* 3.7*      Recent Labs     23  1538 23  0336   HEMOGLOBIN 11.5* 10.8*   HEMATOCRIT 36.2* 33.8*   MCV 87.9 " "86.7   MCH 27.9 27.7   PLATELETCT 87* 85*         Recent Labs     04/18/23  1538 04/19/23  0336   SODIUM 139 140   POTASSIUM 4.6 4.7   CHLORIDE 108 112   CO2 24 22   CREATININE 1.02 0.88        Recent Labs     04/18/23  1538 04/19/23  0336   ALBUMIN 2.8* 2.7*        Pertinent Micro:  Results       Procedure Component Value Units Date/Time    Blood Culture [[id number]] Collected: 04/18/23 1651    Order Status: Completed Specimen: Blood from Peripheral Updated: 04/19/23 0923     Significant Indicator NEG     Source BLD     Site PERIPHERAL     Culture Result No Growth  Note: Blood cultures are incubated for 5 days and  are monitored continuously.Positive blood cultures  are called to the RN and reported as soon as  they are identified.      Narrative:      1 of 2 for Blood Culture x 2 sites order. Per Hospital  Policy: Only change Specimen Src: to \"Line\" if specified by  physician order.  Left Forearm/Arm    Blood Culture [[id number]] Collected: 04/18/23 1538    Order Status: Completed Specimen: Blood from Peripheral Updated: 04/19/23 0923     Significant Indicator NEG     Source BLD     Site PERIPHERAL     Culture Result No Growth  Note: Blood cultures are incubated for 5 days and  are monitored continuously.Positive blood cultures  are called to the RN and reported as soon as  they are identified.      Narrative:      2 of 2 blood culture x2  Sites order. Per Hospital Policy:  Only change Specimen Src: to \"Line\" if specified by physician  order.  Left AC    MRSA By PCR (Amp) [[id number]] Collected: 04/18/23 2350    Order Status: Completed Specimen: Respirate from Nares Updated: 04/19/23 0717     MRSA by PCR Negative    Urinalysis [[id number]] Collected: 04/18/23 1702    Order Status: Completed Specimen: Urine Updated: 04/18/23 1724     Color Yellow     Character Clear     Specific Gravity 1.012     Ph 8.0     Glucose Negative mg/dL      Ketones Negative mg/dL      Protein Negative mg/dL      Bilirubin Negative     " Urobilinogen, Urine 0.2     Nitrite Negative     Leukocyte Esterase Negative     Occult Blood Negative     Micro Urine Req see below     Comment: Microscopic examination not performed when specimen is clear  and chemically negative for protein, blood, leukocyte esterase  and nitrite.         Narrative:      Indication for culture:->Evaluation for sepsis without a  clear source of infection    Urine Culture (NEW) [[id number]] Collected: 04/18/23 1702    Order Status: Sent Specimen: Urine Updated: 04/18/23 1716    Narrative:      Indication for culture:->Evaluation for sepsis without a  clear source of infection          Blood Culture   Date Value Ref Range Status   09/04/2017 No growth after 5 days of incubation.  Final     Blood Culture Hold   Date Value Ref Range Status   10/03/2020 Collected  Final        Studies:  CT-ABDOMEN-PELVIS WITH   Final Result       1.  No acute abnormality in the abdomen or pelvis.   2.  Cirrhosis and portal hypertension, including splenomegaly and portosystemic collaterals. No hepatic mass lesions detected.   3.  Cardiomegaly.       US-RUQ   Final Result       1.  Hepatic steatosis and likely cirrhosis.       2.  Dilatation of the main portal vein suggestive of portal hypertension.       3.  Slight thickening of gallbladder wall 3.5 mm which may be secondary to hypoproteinemia or hypoalbuminemia     IMPRESSION:   Chronic infection left knee arthroplasty, GBS-per patient more swollen prior to admission  Not much on exam currently and unclear if failure of suppression  ABd pain  Cirrhosis/portal HTN NO ascites  Leukopenia      PLAN:   FU blood cultures neg  FU HIDA  Continue Zosyn for now  Recommend synovial fluid analysis if effusion present  Final antibiotic recommendations per culture results and clinical course        Plan of care discussed with HUEY Jha.P.. Will continue to follow    Griselda Vazquez M.D.

## 2023-04-19 NOTE — RESPIRATORY CARE
COPD EDUCATION by COPD CLINICAL EDUCATOR  4/19/2023 at 6:17 AM by Sun Jones, RRT     Patient reviewed by COPD education team. Patient does not have a history/diagnosis of COPD. Patient has a PFT on file from 2/20/2020, 79% ratio. Quit smoking in 2016, therefore doers not qualify for COPD program.  
Monthly or less

## 2023-04-19 NOTE — ED NOTES
Pt requesting pain medication, ERP made aware.  Pt up to the BR with steady gait using personal walker at this time.

## 2023-04-19 NOTE — ED NOTES
Pt transported to CDU by transport with chart and all belongings including cell phone,  and personal walker in stable condition

## 2023-04-19 NOTE — ASSESSMENT & PLAN NOTE
-[id number] Score: 3.    -Was on apixaban which was discontinue due to bleeding.   -Last cardiology appt on 3/8/23.    -30-day Biotel monitor showed frequent PACs, greater than 7%, no atrial fibrillation was noted.

## 2023-04-19 NOTE — PROGRESS NOTES
[facility] Medicine Daily Progress Note    Date of Service  4/19/2023    Chief Complaint  April Alicia is a 63 y.o. female admitted 4/18/2023 with left knee pain.    Hospital Course  April Alicia is a 63 y.o. female with PMH of alcohol, cocaine abuse (currently does not use), cirrhosis, portal hypertension, atrial fibrillation on apixaban, type 2 diabetes mellitus, hypertension, and a complex left knee prosthetic joint infection who presented 4/18/2023 that presented to the ED for suspicion for failing outpatient antibiotic treatment.     Patient was admitted on July 2022 with a group B strep bacteremia, left knee prosthetic joint infection, left shoulder septic arthritis, and subdeltoid abscess.  Plan was for discharge to skilled nursing facility and continue IV ceftriaxone for 6 weeks until 8/30/2022.  Due to some unknown reason, patient only received 4 weeks of antibiotics.  Patient was readmitted on 12/26/2022 due to recurrent left knee pain and swelling.  Was taken back to the OR on 12/28 underwent explantation and placement of articulating spacer and wound VAC.  Multiple OR cultures grew group B Strep.  Underwent synovial fluid removal from the left shoulder on 1/2/2022 which showed WBC 1051, 40% polys, 40% lymphs, not consistent with infection.  Plan was to discharge with 6 weeks of IV antibiotics (ceftriaxone 2 g daily till 2/7/2023).  Per ID documentation, orthopedic MD Garcia recommended oral antibiotics for 1 year prior to reimplantation (status post 2 I's and D's in the past with 2 antibiotic spacers which failed).  Patient was started on Augmentin twice daily on 2/15.  Patient reporting compliance.  Patient had ID appointment on 4/18 and reported general malaise, fatigue, drowsiness, decreased appetite, and left knee swelling for approximately 2 weeks.  Due to concern of failed outpatient treatment, ID recommended presenting to ED ASAP.     In the ED, vital signs within normal  limits.  CBC remarkable for WBC 4.7, hemoglobin 11.5, platelet count 87.  ESR 23.  CHEM panel remarkable for Alk phos 258, albumin 2.8, globulin 3.8.  Lipase 27.  Lactic acid 0.8.  UA unremarkable.  CRP 0.3.  CT A/P demonstrated cirrhosis and portal hypertension, including splenomegaly and portal systemic collaterals; cardiomegaly.  CT left lower extremity pending.      General surgeon, Dr. Oviedo, consulted due to abdominal pain with CT findings.  No indication for acute surgical intervention.  HIDA scan pending.  Orthopedic surgeon, Dr. Luz, consulted who recommended IV antibiotics.  Placed on IV Zosyn.  ID consulted.  Awaiting recommendations.    Interval Problem Update  -Orthopedic surgery consulted.  -ID consulted.  -General surgery consulted.  -Pending HIDA scan.    I have discussed this patient's plan of care and discharge plan at IDT rounds today with Case Management, Nursing, Nursing leadership, and other members of the IDT team.    Consultants/Specialty  general surgery, infectious disease, and orthopedics    Code Status  Full Code    Disposition  Patient is not medically cleared for discharge.   Anticipate discharge to to home with organized home healthcare and close outpatient follow-up.  I have placed the appropriate orders for post-discharge needs.    Review of Systems  Review of Systems   Constitutional:  Positive for chills, fever and malaise/fatigue.   HENT:  Negative for congestion and sore throat.    Eyes:  Negative for pain.   Respiratory:  Negative for cough, shortness of breath and wheezing.    Cardiovascular:  Negative for chest pain, palpitations and leg swelling.   Gastrointestinal:  Positive for abdominal pain and nausea. Negative for diarrhea and vomiting.   Genitourinary:  Negative for dysuria, frequency and urgency.   Musculoskeletal:  Positive for joint pain and myalgias.   Skin:  Negative for rash.   Neurological:  Negative for dizziness, weakness and headaches.    Psychiatric/Behavioral:  Negative for depression. The patient does not have insomnia.       Physical Exam  Temp:  [36.6 °C (97.8 °F)-37.2 °C (99 °F)] 36.6 °C (97.8 °F)  Pulse:  [56-92] 60  Resp:  [16-18] 18  BP: (103-142)/(59-90) 123/70  SpO2:  [93 %-99 %] 96 %    Physical Exam  Vitals and nursing note reviewed.   Constitutional:       General: She is not in acute distress.     Appearance: Normal appearance. She is not toxic-appearing.   HENT:      Head: Normocephalic and atraumatic.      Right Ear: External ear normal.      Left Ear: External ear normal.      Nose: Nose normal.      Mouth/Throat:      Mouth: Mucous membranes are moist.      Pharynx: Oropharynx is clear.   Eyes:      General: No scleral icterus.     Conjunctiva/sclera: Conjunctivae normal.   Cardiovascular:      Rate and Rhythm: Normal rate and regular rhythm.      Pulses: Normal pulses.      Comments: Holosystolic murmur.  Pulmonary:      Effort: Pulmonary effort is normal. No respiratory distress.      Breath sounds: Normal breath sounds.   Abdominal:      General: Bowel sounds are normal. There is no distension.      Palpations: Abdomen is soft.      Tenderness: There is generalized abdominal tenderness. There is no guarding or rebound.   Musculoskeletal:         General: Normal range of motion.      Cervical back: Normal range of motion.      Right lower leg: No edema.      Left lower leg: Edema present.      Comments: Edema and tenderness to left knee.     Skin:     General: Skin is warm and dry.      Coloration: Skin is not jaundiced.   Neurological:      General: No focal deficit present.      Mental Status: She is alert and oriented to person, place, and time. Mental status is at baseline.   Psychiatric:         Mood and Affect: Mood normal.         Behavior: Behavior normal.         Thought Content: Thought content normal.         Judgment: Judgment normal.     Fluids    Intake/Output Summary (Last 24 hours) at 4/19/2023 1237  Last data  filed at 4/18/2023 2100  Gross per 24 hour   Intake 300 ml   Output --   Net 300 ml     Laboratory  Recent Labs     04/18/23  1538 04/19/23  0336   WBC 4.7* 3.7*   RBC 4.12* 3.90*   HEMOGLOBIN 11.5* 10.8*   HEMATOCRIT 36.2* 33.8*   MCV 87.9 86.7   MCH 27.9 27.7   MCHC 31.8* 32.0*   RDW 59.2* 58.3*   PLATELETCT 87* 85*   MPV 10.1 10.5     Recent Labs     04/18/23  1538 04/19/23  0336   SODIUM 139 140   POTASSIUM 4.6 4.7   CHLORIDE 108 112   CO2 24 22   GLUCOSE 102* 89   BUN 20 16   CREATININE 1.02 0.88   CALCIUM 8.5 8.4*           Recent Labs     04/19/23  0336   TRIGLYCERIDE 37   HDL 45   LDL 30     Imaging  CT-EXTREMITY, LOWER WITH RIGHT   Final Result      1.  RIGHT knee prosthesis is normally aligned.   2.  No gross fracture or focal bone destruction.   3.  No significant joint effusion or soft tissue gas.   4.  Artifact limits exam.      CT-ABDOMEN-PELVIS WITH   Final Result      1.  No acute abnormality in the abdomen or pelvis.   2.  Cirrhosis and portal hypertension, including splenomegaly and portosystemic collaterals. No hepatic mass lesions detected.   3.  Cardiomegaly.      US-RUQ   Final Result      1.  Hepatic steatosis and likely cirrhosis.      2.  Dilatation of the main portal vein suggestive of portal hypertension.      3.  Slight thickening of gallbladder wall 3.5 mm which may be secondary to hypoproteinemia or hypoalbuminemia      DX-CHEST-PORTABLE (1 VIEW)   Final Result      1.  No acute cardiac or pulmonary abnormalities are identified.      2.  Old granulomatous disease.      CT-EXTREMITY, LOWER WITH LEFT    (Results Pending)   NM-BILIARY (HIDA) SCAN WITH CCK    (Results Pending)      Assessment/Plan  * Left knee pain- (present on admission)  Assessment & Plan  -Status post left knee prosthetic joint infection 7/2022 + GBS bacteremia.     -On IV ceftriaxone for 4 weeks instead of 6.    -Readmitted 12/2022 due to recurrence.     -I&D 12/28th with for placement of articulating spacer, wound VAC;  cultures positive for GBS.     -Placed on IV ceftriaxone until 2/2023.    -Ortho recommended antibiotics for 1 year before reimplantation.     -Was on Augmentin twice daily since 2/15.    -Continue IV Zosyn.  -CT left lower extremity pending.  -Orthopedic surgery consulted.  -ID consulted.    Thickening of wall of gallbladder  Assessment & Plan  -Suspect secondary to cirrhosis.  -CT A/P showed gallbladder wall thickening, no calcified gallstones.  -General surgery consulted.   -No indication for acute surgical intervention.  -HIDA scan pending.    Pancytopenia (HCC)  Assessment & Plan  -Suspect secondary to cirrhosis.    -History of drug-induced (Rocephin in 2022).  -Transfusion if hemoglobin less than 7.  -Transfusion if platelets less than 20 or evidence of active bleeding.    Hyperlipidemia- (present on admission)  Assessment & Plan  -Lipid panel remarkable for total cholesterol 82.  -Continue outpatient atorvastatin.    Paroxysmal atrial fibrillation, hx of- (present on admission)  Assessment & Plan  -[id number] Score: 3.    -Was on apixaban which was discontinue due to bleeding.   -Last cardiology appt on 3/8/23.    -30-day Biotel monitor showed frequent PACs, greater than 7%, no atrial fibrillation was noted.    Cirrhosis (HCC)- (present on admission)  Assessment & Plan  -Suspect secondary to past chronic alcohol drinking.    -CT A/P demonstrated cirrhosis, portal hypertension, splenomegaly, and portal systemic collaterals.   -Alk phos elevated.  -AST/ALT and tbili WNL.  -Pending INR, unable to calculate MELD.    Hypertension- (present on admission)  Assessment & Plan  -Controlled.    -Restarted outpatient Losartan.  -Continue as needed hydralazine.    Controlled type 2 diabetes mellitus with hyperglycemia, without long-term current use of insulin (HCC)- (present on admission)  Assessment & Plan  -Hemoglobin A1c 5.4% on 1/2023.    -Continue sliding scale insulin with hypoglycemic protocol.    History of  rheumatic fever- (present on admission)  Assessment & Plan  -History of rheumatic fever at age 26.  -ECHO on 7/2022 demonstrated EF 60%, with moderate concentric left ventricular hypertrophy, mild mitral regurgitation.    VTE prophylaxis: SCDs/TEDs    I have performed a physical exam and reviewed and updated ROS and Plan today (4/19/2023). In review of yesterday's note (4/18/2023), there are no changes except as documented above.

## 2023-04-19 NOTE — ASSESSMENT & PLAN NOTE
1.5 weeks of abdominal pain associated with diarrhea, nausea, and vomiting.  Labs with elevation of alk phos, no leukocytosis.  Imaging with mild thickening of the gallbladder wall; favor cirrhosis as etiology; no pericholecystic fluid or evidence of gallbladder inflammation; no cholelithiasis.  No indications for acute surgical intervention at this time.  Concern for cholecystitis low.  Unable to calculate MELD without INR however the presence of multiple large anterior abdominal collaterals makes abdominal surgery perilous in this patient.  4/19 HIDA pending.  4/20 Pt received narcotics this morning. HIDA rescheduled for 4/21.  4/21 HIDA negative.  No plans for surgery.  [facility].

## 2023-04-19 NOTE — ED NOTES
Med rec updated and complete. Allergies reviewed.  Confirmed name and date of birth.   Pt is on Augmentin 500/125 mg. Pt reports that she has been on it long term. Pt stated that she has been currently been on it for 3 months.      Home pharmacy   LONG TERM = [facility] [phone]  SHORT TERM = [facility] [phone]

## 2023-04-19 NOTE — ED NOTES
Report given to May RN on CDU.   Pt ambulatory to bathroom with a steady gait with personal walker. Placed back on monitoring. Pending transport to floor.

## 2023-04-19 NOTE — HOSPITAL COURSE
April Alicia is a 63 y.o. female with PMH of alcohol, cocaine abuse (currently does not use), cirrhosis, portal hypertension, atrial fibrillation on apixaban, type 2 diabetes mellitus, hypertension, and a complex left knee prosthetic joint infection who presented 4/18/2023 that presented to the ED for suspicion for failing outpatient antibiotic treatment.     Patient was admitted on July 2022 with a group B strep bacteremia, left knee prosthetic joint infection, left shoulder septic arthritis, and subdeltoid abscess.  Plan was for discharge to skilled nursing facility and continue IV ceftriaxone for 6 weeks until 8/30/2022.  Due to some unknown reason, patient only received 4 weeks of antibiotics.  Patient was readmitted on 12/26/2022 due to recurrent left knee pain and swelling.  Was taken back to the OR on 12/28 underwent explantation and placement of articulating spacer and wound VAC.  Multiple OR cultures grew group B Strep.  Underwent synovial fluid removal from the left shoulder on 1/2/2022 which showed WBC 1051, 40% polys, 40% lymphs, not consistent with infection.  Plan was to discharge with 6 weeks of IV antibiotics (ceftriaxone 2 g daily till 2/7/2023).  Per ID documentation, orthopedic MD Garcia recommended oral antibiotics for 1 year prior to reimplantation (status post 2 I's and D's in the past with 2 antibiotic spacers which failed).  Patient was started on Augmentin twice daily on 2/15.  Patient reporting compliance.  Patient had ID appointment on 4/18 and reported general malaise, fatigue, drowsiness, decreased appetite, and left knee swelling for approximately 2 weeks.  Due to concern of failed outpatient treatment, ID recommended presenting to ED ASAP.     In the ED, vital signs within normal limits.  CBC remarkable for WBC 4.7, hemoglobin 11.5, platelet count 87.  ESR 23.  CMP remarkable for Alk phos 258, albumin 2.8, globulin 3.8.  Lipase 27.  Lactic acid 0.8.  UA unremarkable.  CRP  0.3.  CT A/P demonstrated cirrhosis and portal hypertension, including splenomegaly and portal systemic collaterals; cardiomegaly.  CT left lower extremity pending.      General surgeon, Dr. Oviedo, consulted due to abdominal pain with CT findings.  No indication for acute surgical intervention.  HIDA scan pending.  Orthopedic surgeon, Dr. Luz, consulted who recommended IV antibiotics.  Placed on IV Zosyn.  ID consulted.  Awaiting recommendations.

## 2023-04-19 NOTE — ASSESSMENT & PLAN NOTE
-Suspect secondary to cirrhosis.  -CT A/P showed gallbladder wall thickening, no calcified gallstones.  -General surgery consulted.   -No indication for acute surgical intervention.  -HIDA scan pending.

## 2023-04-19 NOTE — ED NOTES
Bedside report received from Negar MONTAÑO. Pt placed on all monitoring, on RA, VSS, call light within reach, bed in lowest position and side rails up. Pt requesting food and water, provided water per ok with ERP. Pt updated on her current POC.

## 2023-04-19 NOTE — PROGRESS NOTES
Received report from previous RN. Patient is alert and oriented x 4. Up to toilet, FWW x 1 assist. Elizabeth care done, changed undies and gown. Call light and personal items within reached. Kept safe and monitor. MRSA swab result pending.

## 2023-04-19 NOTE — PROGRESS NOTES
4 Eyes Skin Assessment Completed by DANYEL Magdaleno and DANYEL Kumar.    Head WDL  Ears WDL  Nose WDL  Mouth WDL  Neck WDL  Breast/Chest WDL  Shoulder Blades WDL  Spine WDL  (R) Arm/Elbow/Hand WDL  (L) Arm/Elbow/Hand WDL  Abdomen WDL  Groin WDL  Scrotum/Coccyx/Buttocks WDL  (R) Leg old incision healed, ruthy/brown color  (L) Leg old incision healed, ruthy/brown color  (R) Heel/Foot/Toe WDL  (L) Heel/Foot/Toe WDL          Devices In Places Pulse Ox      Interventions In Place Pressure Redistribution Mattress    Possible Skin Injury No    Pictures Uploaded Into Epic N/A  Wound Consult Placed N/A  RN Wound Prevention Protocol Ordered No

## 2023-04-19 NOTE — ASSESSMENT & PLAN NOTE
-Suspect secondary to past chronic alcohol drinking.    -CT A/P demonstrated cirrhosis, portal hypertension, splenomegaly, and portal systemic collaterals.   -Alk phos elevated.  -AST/ALT and tbili WNL.  -Pending INR, unable to calculate MELD.    4/21 INR 1.36  hida neg, abd soreness, monitor  Left >right  Ct neg for effusion or fx  Improving, chronic pain  Pt/ot cleared for dc home, no Dme    4/22 discharge home in a.m. if able to ambulate  Reports mild recurrent pain with abdominal, monitor oral intake

## 2023-04-19 NOTE — H&P
[facility] Internal Medicine History & Physical Note    Date of Service  4/19/2023    [facility] Team:CATARINO  Attending: Dougie Tillman M.d.  Senior Resident: Dr. Sonia Schulte  Contact Number: [phone]    Primary Care Physician  Karen Mcclure P.A.-C.    Consultants  general surgery and orthopedics    Specialist Names: Dr. Oviedo, Dr. Luz    Code Status  Full Code    Chief Complaint  Chief Complaint   Patient presents with    Knee Pain    Fatigue       History of Presenting Illness (HPI):   April Alicia is a 63 y.o. female who presented 4/18/2023 with a past medical history alcohol and cocaine abuse (currently does not use), cirrhosis, portal hypertension, atrial fibrillation on apixaban, type 2 diabetes mellitus, hypertension, and a complex left knee prosthetic joint infection that was admitted by the ID outpatient clinic due to potentially failing outpatient antibiotic treatment. Patient was admitted on July 2022 with a group B strep bacteremia and left knee prosthetic joint infection along with a left shoulder septic arthritis and some subdeltoid abscess.  Plan was for her to discharge to a skilled nursing facility and continue IV ceftriaxone for 6 weeks until 8/30/2022; due to some unknown reason patient only received 4 weeks of antibiotics.  Patient was readmitted on 12/26/2022 due to recurrent left knee pain and swelling.  She was taken back to the OR on 12/28 underwent explantation and placement of articulating spacer, wound VAC.  Multiple OR cultures again growing group B Strep. Underwent synovial fluid removal from the left shoulder on 1/2/2022 WBC 1051, 40% polys, 40% lymphs, not consistent with infection.  Plan at discharge with 6 weeks of IV antibiotics ceftriaxone 2 g daily till 2/7/2023.  Per ID documentation, orthopedic MD Garcia recommended oral antibiotics for 1 year prior to reimplantation (status post 2 I's and D's in the past with 2 antibiotic spacers which were both failed).  Patient  was started on Augmentin twice daily since 2/15; apparently has been compliant.  Patient had ID appointment today and remarked feeling general malaise, fatigue, drowsiness, decreased appetite, and left knee swelling for approximately 2 weeks.  ID was concern of failed outpatient treatment and recommended to go to ER ASAP.    In the ED, patient AAOx4, in no acute distress.  Vital signs within normal limits.  CBC remarkable for WBC 4.7, hemoglobin 11.5, platelet count 87.  ESR 23.  CHEM panel remarkable for , albumin 2.8, globulin 3.8.  Lipase 27.  Lactic acid 0.8.  UA unremarkable.  CRP 0.3.  CT of the abdomen/pelvis demonstrated cirrhosis and portal hypertension, including splenomegaly and portal systemic collaterals; cardiomegaly.  Patient accidentally got CT of her right knee instead of left knee; pending CT of left knee.  Patient was given LR bolus.  ERP contacted Dr. Luz (orthopedic surgeon) who will assess the patient in the a.m.; will probably not do any surgical intervention, recommends IV antibiotics at this point.  Dr. Oviedo from general surgery was contacted to evaluate patient's abdominal pain; pending recommendations.    I discussed the plan of care with patient.    Review of Systems  Review of Systems   Constitutional:  Positive for chills, fever, malaise/fatigue and weight loss. Negative for diaphoresis.   Eyes:  Negative for blurred vision and double vision.   Respiratory:  Negative for cough and shortness of breath.    Cardiovascular:  Negative for chest pain, palpitations and leg swelling.   Gastrointestinal:  Positive for abdominal pain and nausea. Negative for constipation, diarrhea and vomiting.   Genitourinary:  Negative for dysuria.   Musculoskeletal:  Positive for joint pain (Left knee) and myalgias.   Skin:  Negative for rash.   Neurological:  Negative for dizziness, sensory change, seizures and headaches.   Psychiatric/Behavioral:  Negative for substance abuse.      Past Medical  History   has a past medical history of Arrhythmia, Arthritis, ASTHMA, Blood clotting disorder (HCC) (2018), Dental disorder, Diabetes, Emphysema of lung (HCC), Heart abnormality, Heart burn, Heart valve disease, Hypertension, Infection (9/4/2017), Infection (2018), Liver disease, Pneumonia (02/2020), and Seizure disorder (HCC).    Surgical History   has a past surgical history that includes gyn surgery (1982); gyn surgery (2003); colonoscopy with clipping (10/28/2015); colonoscopy with sclerotherapy (10/28/2015); colonoscopy with tattooing (10/28/2015); irrigation & debridement ortho (Right, 9/3/2017); knee arthroplasty total (Right, 2018); pr total knee arthroplasty (Left, 8/24/2020); irrigation & debridement general (Left, 7/17/2022); pr revise knee joint replace,all parts (Left, 7/19/2022); pr revise knee joint replace,all parts (Left, 12/28/2022); and irrigation & debridement general (Left, 12/28/2022).     Family History  family history is not on file.   Family history reviewed with patient. Patient doesn't remember    Social History  Tobacco: Past user; quit approximately 7 years ago  Alcohol: Quit approximately 10 years ago; drank multiple shots daily  Recreational drugs (illegal or prescription): Past user; intranasal cocaine  Employment: Unemployed   Living Situation: Lives with daughter  Recent Travel: Denies  Primary Care Provider: Reviewed    Other (stressors, spirituality, exposures): Denies    Allergies  Allergies   Allergen Reactions    Nkda [No Known Drug Allergy]        Medications  Prior to Admission Medications   Prescriptions Last Dose Informant Patient Reported? Taking?   Ascorbic Acid (VITAMIN C) 1000 MG Tab 4/18/2023 at 0800 Patient Yes Yes   Sig: Take 1,000 mg by mouth every day.   Cholecalciferol (VITAMIN D3) 50 MCG (2000 UT) Tab 4/18/2023 at 0800 Patient Yes No   Sig: Take 2,000 Units by mouth every day.   MULTAQ 400 MG Tab 4/18/2023 at 0800 Patient No No   Sig: Take 1 Tablet by mouth 2  times a day with meals.   REFRESH TEARS 0.5 % Solution 4/18/2023 at 1100 Patient Yes No   Sig: Administer 2 Drops into both eyes as needed (dry eyes).   SPIRIVA HANDIHALER 18 MCG Cap 4/18/2023 at 0930 Patient Yes No   Sig: Place 1 Capsule into inhaler and inhale every day.   amoxicillin-clavulanate (AUGMENTIN) 500-125 MG Tab 4/18/2023 at 0800 Patient No No   Sig: Take 1 Tablet by mouth 2 times a day.   atorvastatin (LIPITOR) 20 MG Tab 4/17/2023 Patient No No   Sig: Take 1 Tablet by mouth every evening.   felodipine ER (PLENDIL) 5 MG TABLET SR 24 HR 4/18/2023 at 0800 Patient Yes Yes   Sig: Take 5 mg by mouth every day.   ferrous sulfate 325 (65 Fe) MG tablet 4/18/2023 at 0800 Patient Yes No   Sig: Take 325 mg by mouth every day.   furosemide (LASIX) 20 MG Tab 4/18/2023 at 0800 Patient No No   Sig: Take 1 Tablet by mouth every day.   gabapentin (NEURONTIN) 400 MG Cap 4/18/2023 at 0800 Patient Yes No   Sig: Take 400 mg by mouth 2 times a day.   lactulose 20 GM/30ML Solution 4/18/2023 at 0800 Patient No No   Sig: Take 15 mL by mouth every day.   losartan (COZAAR) 100 MG Tab 4/18/2023 at 0800 Patient Yes Yes   Sig: Take 100 mg by mouth every day.   magnesium oxide (MAG-OX) 400 MG Tab tablet 4/18/2023 at 0800 Patient Yes No   Sig: Take 400 mg by mouth every day.   pantoprazole (PROTONIX) 20 MG tablet 4/18/2023 at 0800 Patient Yes Yes   Sig: Take 20 mg by mouth every day.   potassium chloride (MICRO-K) 10 MEQ capsule 4/18/2023 at 0800 Patient No No   Sig: Take 1 Capsule by mouth every day.   psyllium (METAMUCIL) 58.12 % Pack 4/18/2023 at 0800 Patient Yes Yes   Sig: Take 1 Packet by mouth every day.   sodium bicarbonate (SODIUM BICARBONATE) 650 MG Tab 4/18/2023 at 0800 Patient No No   Sig: Take 2 Tablets by mouth 2 times a day.   vitamin e (VITAMIN E) 400 UNIT Cap 4/18/2023 at 0800 Patient Yes Yes   Sig: Take 400 Units by mouth every day.   zinc sulfate (ZINCATE) 220 (50 Zn) MG Cap 4/18/2023 at 0800 Patient No No   Sig:  Take 1 Capsule by mouth every day.      Facility-Administered Medications: None       Physical Exam  Temp:  [36.6 °C (97.8 °F)-37.2 °C (99 °F)] 36.6 °C (97.8 °F)  Pulse:  [56-92] 63  Resp:  [16-18] 18  BP: (103-142)/(59-90) 127/76  SpO2:  [93 %-99 %] 93 %  Blood Pressure: 126/70   Temperature: 37.1 °C (98.8 °F)   Pulse: 66   Respiration: 18   Pulse Oximetry: 99 %       Physical Exam  Vitals and nursing note reviewed.   Constitutional:       General: She is not in acute distress.     Appearance: Normal appearance. She is obese.   HENT:      Head: Normocephalic and atraumatic.      Nose: Nose normal.      Mouth/Throat:      Mouth: Mucous membranes are dry.   Eyes:      Extraocular Movements: Extraocular movements intact.      Conjunctiva/sclera: Conjunctivae normal.      Pupils: Pupils are equal, round, and reactive to light.   Cardiovascular:      Rate and Rhythm: Normal rate and regular rhythm.      Pulses: Normal pulses.      Heart sounds: Murmur heard.   Pulmonary:      Effort: Pulmonary effort is normal. No respiratory distress.      Breath sounds: Normal breath sounds.   Chest:      Chest wall: No tenderness.   Abdominal:      General: Bowel sounds are normal. There is no distension.      Palpations: Abdomen is soft.      Tenderness: There is no abdominal tenderness.   Musculoskeletal:         General: Swelling (left knee), tenderness (Left knee) and deformity (BL LE skin grafts) present. Normal range of motion.      Right lower leg: No edema.      Left lower leg: No edema.   Skin:     General: Skin is warm and dry.   Neurological:      General: No focal deficit present.      Mental Status: She is alert and oriented to person, place, and time. Mental status is at baseline.   Psychiatric:         Mood and Affect: Mood normal.         Behavior: Behavior normal.       Laboratory:  Recent Labs     04/18/23  1538   WBC 4.7*   RBC 4.12*   HEMOGLOBIN 11.5*   HEMATOCRIT 36.2*   MCV 87.9   MCH 27.9   MCHC 31.8*   RDW  59.2*   PLATELETCT 87*   MPV 10.1     Recent Labs     04/18/23  1538   SODIUM 139   POTASSIUM 4.6   CHLORIDE 108   CO2 24   GLUCOSE 102*   BUN 20   CREATININE 1.02   CALCIUM 8.5     Recent Labs     04/18/23  1538   ALTSGPT 25   ASTSGOT 31   ALKPHOSPHAT 258*   TBILIRUBIN 0.5   LIPASE 27   GLUCOSE 102*         No results for input(s): NTPROBNP in the last 72 hours.      No results for input(s): TROPONINT in the last 72 hours.    Imaging:  CT-EXTREMITY, LOWER WITH RIGHT   Final Result      1.  RIGHT knee prosthesis is normally aligned.   2.  No gross fracture or focal bone destruction.   3.  No significant joint effusion or soft tissue gas.   4.  Artifact limits exam.      CT-ABDOMEN-PELVIS WITH   Final Result      1.  No acute abnormality in the abdomen or pelvis.   2.  Cirrhosis and portal hypertension, including splenomegaly and portosystemic collaterals. No hepatic mass lesions detected.   3.  Cardiomegaly.      US-RUQ   Final Result      1.  Hepatic steatosis and likely cirrhosis.      2.  Dilatation of the main portal vein suggestive of portal hypertension.      3.  Slight thickening of gallbladder wall 3.5 mm which may be secondary to hypoproteinemia or hypoalbuminemia      DX-CHEST-PORTABLE (1 VIEW)   Final Result      1.  No acute cardiac or pulmonary abnormalities are identified.      2.  Old granulomatous disease.          X-Ray:  My impression is: No acute cardiopulmonary process    Assessment/Plan:  Problem Representation:   63-year-old female with a complex infectious disease history that includes GBS bacteremia and left knee prosthetic joint infection that has failed both antibiotics and I&D's since July 2022 admitted for further evaluation of left knee pain most likely secondary to probable failed outpatient treatment.    I anticipate this patient is appropriate for observation status at this time because patient hemodynamically stable; pending inpatient ID and general surgery evaluation.    Patient  will need a Med/Surg bed on MEDICAL service .  The need is secondary to left knee pain.    * Left knee pain- (present on admission)  Assessment & Plan  Status post left knee prosthetic joint infection 7/2022 + GBS bacteremia.  On IV ceftriaxone for only 4 weeks instead of 6.  Readmitted 12/2022 due to recurrence.  I&D 12/28th with for placement of articulating spacer, wound VAC; cultures positive for GBS.  IV ceftriaxone until 2/2023.  Ortho recommended antibiotics for 1 year before reimplantation.  Was on Augmentin twice daily since 2/15.  Today patient remarked generalized fatigue, weakness, decreased appetite, left knee swelling for approximately 2 weeks at her outpatient ID appointment.  Concerns for failed outpatient antibiotic regimen.  No leukocytosis, no lactic acidosis, no CRP; pending left knee CT.  - Admit for obs  - Continuous pulse ox  - Left knee CT: Pending  - Ortho: We will reassess in the a.m.; most likely only IV antibiotics  - IV Zosyn  - Recommend ID consult in the a.m. for further assessment    Cirrhosis (HCC)- (present on admission)  Assessment & Plan  Is likely secondary to past chronic alcohol drinking.  Pancytopenia, increased ALP.  Portal hypertension.  Patient remarks some diffuse abdominal pain.  ERP consulted general surgery: Pending recommendations.  No leukocytosis, lactic acidosis, CRP, nothing distinctive seen on CT abdomen.  - Pain management  - General surgery: Pending recommendations    Overweight  Assessment & Plan  - Recommend outpatient management    Pancytopenia (HCC)  Assessment & Plan  WBC: 4.7, hemoglobin 11.5, platelets 87.  History of drug-induced (Rocephin in 2022); ?In the setting of cirrhosis.  - Continue to monitor with CBC  - Consider transfusion if hemoglobin less than 7  - Consider transfusion if platelets less than 20 vs bleeding    Hyperlipidemia- (present on admission)  Assessment & Plan  - Continue home atorvastatin    Paroxysmal atrial fibrillation, hx of-  (present on admission)  Assessment & Plan  Was apparently on apixaban at some point (apparently d/isaak 2/2 bleeding); not currently on her med list. Last cardiology appt 3/8:  30-day Biotel monitor results with patient over the phone. She does have frequent PACs, greater than 7% no atrial fibrillation was noted.  -[id number] Score: 3     Hypertension- (present on admission)  Assessment & Plan  Patient currently normotensive  - Considering starting home meds in a.m.    Controlled type 2 diabetes mellitus with hyperglycemia, without long-term current use of insulin (HCC)- (present on admission)  Assessment & Plan  A1c 1/2023: 5.4%.  Does not use any home meds.    History of rheumatic fever- (present on admission)  Assessment & Plan  H/o rheumatic fever at age 26; systolic murmur on exam, likely MR.  Echo 7/2022 demonstrated EF 60%, with moderate concentric left ventricular hypertrophy, mild mitral regurgitation.        VTE prophylaxis: SCDs/TEDs

## 2023-04-19 NOTE — ASSESSMENT & PLAN NOTE
-Status post left knee prosthetic joint infection 7/2022 + GBS bacteremia.     -On IV ceftriaxone for 4 weeks instead of 6.    -Readmitted 12/2022 due to recurrence.     -I&D 12/28th with for placement of articulating spacer, wound VAC; cultures positive for GBS.     -Placed on IV ceftriaxone until 2/2023.    -Ortho recommended antibiotics for 1 year before reimplantation.     -Was on Augmentin twice daily since 2/15.    -Continue IV Zosyn.  -CT left lower extremity pending.  -Orthopedic surgery consulted.  -ID consulted.    4/21 pt /ot eval  CT neg for fx/infection  Pain control    4/22 cleared for discharge home with family  Encourage activity

## 2023-04-19 NOTE — PROGRESS NOTES
"  DATE: 4/19/2023    Hospital day 2 abdominal pain.    INTERVAL EVENTS:  ANC 1.66, plts 85    REVIEW OF SYSTEMS:  Review of Systems   Constitutional:  Positive for malaise/fatigue.   Gastrointestinal:  Positive for abdominal pain.       PHYSICAL EXAMINATION:    Vital Signs: /66   Pulse 60   Temp 36.9 °C (98.5 °F) (Temporal)   Resp 18   Ht 1.727 m (5' 8\")   Wt 61.1 kg (134 lb 11.2 oz)   SpO2 94%   Physical Exam  Constitutional:       General: She is sleeping. She is not in acute distress.     Appearance: She is overweight.   Abdominal:      Tenderness: There is abdominal tenderness. There is no guarding.   Neurological:      Mental Status: She is easily aroused.       LABORATORY VALUES:   Recent Labs     04/18/23  1538 04/19/23  0336   WBC 4.7* 3.7*   RBC 4.12* 3.90*   HEMOGLOBIN 11.5* 10.8*   HEMATOCRIT 36.2* 33.8*   MCV 87.9 86.7   MCH 27.9 27.7   MCHC 31.8* 32.0*   RDW 59.2* 58.3*   PLATELETCT 87* 85*   MPV 10.1 10.5     Recent Labs     04/18/23  1538 04/19/23  0336   SODIUM 139 140   POTASSIUM 4.6 4.7   CHLORIDE 108 112   CO2 24 22   GLUCOSE 102* 89   BUN 20 16   CREATININE 1.02 0.88   CALCIUM 8.5 8.4*     Recent Labs     04/18/23  1538 04/19/23  0336   ASTSGOT 31 27   ALTSGPT 25 21   TBILIRUBIN 0.5 0.5   ALKPHOSPHAT 258* 216*   GLOBULIN 3.8* 3.2            IMAGING:   CT-EXTREMITY, LOWER WITH RIGHT   Final Result      1.  RIGHT knee prosthesis is normally aligned.   2.  No gross fracture or focal bone destruction.   3.  No significant joint effusion or soft tissue gas.   4.  Artifact limits exam.      CT-ABDOMEN-PELVIS WITH   Final Result      1.  No acute abnormality in the abdomen or pelvis.   2.  Cirrhosis and portal hypertension, including splenomegaly and portosystemic collaterals. No hepatic mass lesions detected.   3.  Cardiomegaly.      US-RUQ   Final Result      1.  Hepatic steatosis and likely cirrhosis.      2.  Dilatation of the main portal vein suggestive of portal hypertension.      3.  " Slight thickening of gallbladder wall 3.5 mm which may be secondary to hypoproteinemia or hypoalbuminemia      DX-CHEST-PORTABLE (1 VIEW)   Final Result      1.  No acute cardiac or pulmonary abnormalities are identified.      2.  Old granulomatous disease.      CT-EXTREMITY, LOWER WITH LEFT    (Results Pending)   NM-BILIARY (HIDA) SCAN WITH CCK    (Results Pending)       ASSESSMENT AND PLAN:     Epigastric pain- (present on admission)  Assessment & Plan  1.5 weeks of abdominal pain associated with diarrhea, nausea, and vomiting.  Labs with elevation of alk phos, no leukocytosis.  Imaging with mild thickening of the gallbladder wall; favor cirrhosis as etiology; no pericholecystic fluid or evidence of gallbladder inflammation; no cholelithiasis.  No indications for acute surgical intervention at this time.  Concern for cholecystitis low.  Unable to calculate MELD without INR however the presence of multiple large anterior abdominal collaterals makes abdominal surgery perilous in this patient.  4/19 HIDA pending.  [facility].             ____________________________________     EDITH Griffin.    DD: 4/19/2023  11:04 AM

## 2023-04-19 NOTE — ASSESSMENT & PLAN NOTE
-Suspect secondary to cirrhosis.    -History of drug-induced (Rocephin in 2022).  -Transfusion if hemoglobin less than 7.  -Transfusion if platelets less than 20 or evidence of active bleeding.

## 2023-04-19 NOTE — CARE PLAN
The patient is Stable - Low risk of patient condition declining or worsening    Shift Goals  Clinical Goals: pain management, safety, abx  Patient Goals: rest, home soon    Progress made toward(s) clinical / shift goals:    Eating well.   Voiding without difficulty. Denies diarrhea.  Resting.    Patient is not progressing towards the following goals:      Problem: Pain - Standard  Goal: Alleviation of pain or a reduction in pain to the patient’s comfort goal  Outcome: Progressing  Note: Oxycodone given for knee pain with good result. Resting and calm.      Problem: Knowledge Deficit - Standard  Goal: Patient and family/care givers will demonstrate understanding of plan of care, disease process/condition, diagnostic tests and medications  Outcome: Progressing  Note: Plan of care reviewed including labs, medications and safety. Verbalized understanding and agrees. Able to make needs known.

## 2023-04-20 PROBLEM — R10.9 ABDOMINAL PAIN: Status: ACTIVE | Noted: 2023-04-20

## 2023-04-20 LAB
ANION GAP SERPL CALC-SCNC: 10 MMOL/L (ref 7–16)
BACTERIA UR CULT: NORMAL
BASOPHILS # BLD AUTO: 2.1 % (ref 0–1.8)
BASOPHILS # BLD: 0.08 K/UL (ref 0–0.12)
BUN SERPL-MCNC: 17 MG/DL (ref 8–22)
CALCIUM SERPL-MCNC: 8.1 MG/DL (ref 8.5–10.5)
CHLORIDE SERPL-SCNC: 109 MMOL/L (ref 96–112)
CO2 SERPL-SCNC: 19 MMOL/L (ref 20–33)
CREAT SERPL-MCNC: 1.04 MG/DL (ref 0.5–1.4)
EOSINOPHIL # BLD AUTO: 0.22 K/UL (ref 0–0.51)
EOSINOPHIL NFR BLD: 5.6 % (ref 0–6.9)
ERYTHROCYTE [DISTWIDTH] IN BLOOD BY AUTOMATED COUNT: 56.9 FL (ref 35.9–50)
GFR SERPLBLD CREATININE-BSD FMLA CKD-EPI: 60 ML/MIN/1.73 M 2
GLUCOSE BLD STRIP.AUTO-MCNC: 117 MG/DL (ref 65–99)
GLUCOSE BLD STRIP.AUTO-MCNC: 136 MG/DL (ref 65–99)
GLUCOSE BLD STRIP.AUTO-MCNC: 173 MG/DL (ref 65–99)
GLUCOSE BLD STRIP.AUTO-MCNC: 87 MG/DL (ref 65–99)
GLUCOSE SERPL-MCNC: 101 MG/DL (ref 65–99)
HCT VFR BLD AUTO: 35.3 % (ref 37–47)
HGB BLD-MCNC: 11.6 G/DL (ref 12–16)
IMM GRANULOCYTES # BLD AUTO: 0.01 K/UL (ref 0–0.11)
IMM GRANULOCYTES NFR BLD AUTO: 0.3 % (ref 0–0.9)
LYMPHOCYTES # BLD AUTO: 1.52 K/UL (ref 1–4.8)
LYMPHOCYTES NFR BLD: 39 % (ref 22–41)
MCH RBC QN AUTO: 28 PG (ref 27–33)
MCHC RBC AUTO-ENTMCNC: 32.9 G/DL (ref 33.6–35)
MCV RBC AUTO: 85.3 FL (ref 81.4–97.8)
MONOCYTES # BLD AUTO: 0.26 K/UL (ref 0–0.85)
MONOCYTES NFR BLD AUTO: 6.7 % (ref 0–13.4)
NEUTROPHILS # BLD AUTO: 1.81 K/UL (ref 2–7.15)
NEUTROPHILS NFR BLD: 46.3 % (ref 44–72)
NRBC # BLD AUTO: 0 K/UL
NRBC BLD-RTO: 0 /100 WBC
PLATELET # BLD AUTO: 94 K/UL (ref 164–446)
PMV BLD AUTO: 10.6 FL (ref 9–12.9)
POTASSIUM SERPL-SCNC: 4.3 MMOL/L (ref 3.6–5.5)
RBC # BLD AUTO: 4.14 M/UL (ref 4.2–5.4)
SIGNIFICANT IND 70042: NORMAL
SITE SITE: NORMAL
SODIUM SERPL-SCNC: 138 MMOL/L (ref 135–145)
SOURCE SOURCE: NORMAL
WBC # BLD AUTO: 3.9 K/UL (ref 4.8–10.8)

## 2023-04-20 PROCEDURE — 99232 SBSQ HOSP IP/OBS MODERATE 35: CPT | Performed by: INTERNAL MEDICINE

## 2023-04-20 PROCEDURE — A9270 NON-COVERED ITEM OR SERVICE: HCPCS | Performed by: STUDENT IN AN ORGANIZED HEALTH CARE EDUCATION/TRAINING PROGRAM

## 2023-04-20 PROCEDURE — 700102 HCHG RX REV CODE 250 W/ 637 OVERRIDE(OP): Performed by: STUDENT IN AN ORGANIZED HEALTH CARE EDUCATION/TRAINING PROGRAM

## 2023-04-20 PROCEDURE — 770006 HCHG ROOM/CARE - MED/SURG/GYN SEMI*

## 2023-04-20 PROCEDURE — 96366 THER/PROPH/DIAG IV INF ADDON: CPT

## 2023-04-20 PROCEDURE — 700111 HCHG RX REV CODE 636 W/ 250 OVERRIDE (IP): Performed by: STUDENT IN AN ORGANIZED HEALTH CARE EDUCATION/TRAINING PROGRAM

## 2023-04-20 PROCEDURE — 85025 COMPLETE CBC W/AUTO DIFF WBC: CPT

## 2023-04-20 PROCEDURE — A9270 NON-COVERED ITEM OR SERVICE: HCPCS | Performed by: NURSE PRACTITIONER

## 2023-04-20 PROCEDURE — 700102 HCHG RX REV CODE 250 W/ 637 OVERRIDE(OP): Performed by: NURSE PRACTITIONER

## 2023-04-20 PROCEDURE — 82962 GLUCOSE BLOOD TEST: CPT | Mod: 91

## 2023-04-20 PROCEDURE — 99232 SBSQ HOSP IP/OBS MODERATE 35: CPT | Performed by: NURSE PRACTITIONER

## 2023-04-20 PROCEDURE — 36415 COLL VENOUS BLD VENIPUNCTURE: CPT

## 2023-04-20 PROCEDURE — 80048 BASIC METABOLIC PNL TOTAL CA: CPT

## 2023-04-20 PROCEDURE — 700105 HCHG RX REV CODE 258: Performed by: STUDENT IN AN ORGANIZED HEALTH CARE EDUCATION/TRAINING PROGRAM

## 2023-04-20 RX ORDER — IBUPROFEN 600 MG/1
600 TABLET ORAL EVERY 6 HOURS PRN
Status: DISCONTINUED | OUTPATIENT
Start: 2023-04-20 | End: 2023-04-23 | Stop reason: HOSPADM

## 2023-04-20 RX ORDER — KETOROLAC TROMETHAMINE 30 MG/ML
15 INJECTION, SOLUTION INTRAMUSCULAR; INTRAVENOUS EVERY 6 HOURS PRN
Status: DISCONTINUED | OUTPATIENT
Start: 2023-04-20 | End: 2023-04-23 | Stop reason: HOSPADM

## 2023-04-20 RX ADMIN — OXYCODONE 5 MG: 5 TABLET ORAL at 02:09

## 2023-04-20 RX ADMIN — PIPERACILLIN AND TAZOBACTAM 3.38 G: 3; .375 INJECTION, POWDER, LYOPHILIZED, FOR SOLUTION INTRAVENOUS; PARENTERAL at 05:42

## 2023-04-20 RX ADMIN — CELECOXIB 200 MG: 200 CAPSULE ORAL at 17:56

## 2023-04-20 RX ADMIN — LOSARTAN POTASSIUM 100 MG: 50 TABLET, FILM COATED ORAL at 05:45

## 2023-04-20 RX ADMIN — PIPERACILLIN AND TAZOBACTAM 3.38 G: 3; .375 INJECTION, POWDER, LYOPHILIZED, FOR SOLUTION INTRAVENOUS; PARENTERAL at 21:15

## 2023-04-20 RX ADMIN — ACETAMINOPHEN 650 MG: 325 TABLET, FILM COATED ORAL at 12:47

## 2023-04-20 RX ADMIN — CELECOXIB 200 MG: 200 CAPSULE ORAL at 05:43

## 2023-04-20 RX ADMIN — DOCUSATE SODIUM 50 MG AND SENNOSIDES 8.6 MG 2 TABLET: 8.6; 5 TABLET, FILM COATED ORAL at 17:56

## 2023-04-20 RX ADMIN — OXYCODONE 5 MG: 5 TABLET ORAL at 08:16

## 2023-04-20 RX ADMIN — DOCUSATE SODIUM 50 MG AND SENNOSIDES 8.6 MG 2 TABLET: 8.6; 5 TABLET, FILM COATED ORAL at 05:42

## 2023-04-20 RX ADMIN — INSULIN HUMAN 1 UNITS: 100 INJECTION, SOLUTION PARENTERAL at 20:33

## 2023-04-20 RX ADMIN — PIPERACILLIN AND TAZOBACTAM 3.38 G: 3; .375 INJECTION, POWDER, LYOPHILIZED, FOR SOLUTION INTRAVENOUS; PARENTERAL at 15:09

## 2023-04-20 RX ADMIN — ATORVASTATIN CALCIUM 20 MG: 20 TABLET, FILM COATED ORAL at 20:36

## 2023-04-20 ASSESSMENT — ENCOUNTER SYMPTOMS
EYE PAIN: 0
ABDOMINAL PAIN: 1
DIZZINESS: 0
CHILLS: 0
PALPITATIONS: 0
DIARRHEA: 0
COUGH: 0
WEAKNESS: 0
FEVER: 0
HEADACHES: 0
INSOMNIA: 0
SHORTNESS OF BREATH: 0
NAUSEA: 0
MYALGIAS: 1
VOMITING: 0

## 2023-04-20 ASSESSMENT — PAIN SCALES - WONG BAKER: WONGBAKER_NUMERICALRESPONSE: HURTS JUST A LITTLE BIT

## 2023-04-20 ASSESSMENT — PAIN DESCRIPTION - PAIN TYPE
TYPE: ACUTE PAIN

## 2023-04-20 NOTE — DISCHARGE PLANNING
Care Transition Team Assessment    Information Source  Orientation Level: Oriented X4  Information Given By: Patient  Informant's Name: April Roldan  Who is responsible for making decisions for patient? : Patient    Readmission Evaluation  Is this a readmission?: No    Elopement Risk  Legal Hold: No  Ambulatory or Self Mobile in Wheelchair: Yes  Disoriented: No  Psychiatric Symptoms: None  History of Wandering: No  Elopement this Admit: No  Vocalizing Wanting to Leave: No  Displays Behaviors, Body Language Wanting to Leave: No-Not at Risk for Elopement  Elopement Risk: Not at Risk for Elopement    Interdisciplinary Discharge Planning  Primary Care Physician: Karen Mcclure PAC  Pharmacy: [facility]  Lives with - Patient's Self Care Capacity: Adult Children (Daughter Ava Laureano)  Patient or legal guardian wants to designate a caregiver: No  Support Systems: Family Member(s)  Housing / Facility: 1 Story House (2 stairs)  Do You Take your Prescribed Medications Regularly: Yes  Able to Return to Previous ADL's: Yes  Mobility Issues: Yes (rollator, cane)  Prior Services: None  Patient Prefers to be Discharged to:: Home  Assistance Needed: Yes  Durable Medical Equipment:  (cane, rollator)  DME Provider / Phone: [facility]    Discharge Preparedness  What is your plan after discharge?: Uncertain - pending medical team collaboration  What are your discharge supports?: Child  Prior Functional Level: Independent with Activities of Daily Living (daughter assist w/anything she needs help with)    Functional Assesment  Prior Functional Level: Independent with Activities of Daily Living (daughter assist w/anything she needs help with)    Finances  Financial Barriers to Discharge: No  Prescription Coverage: Yes    Vision / Hearing Impairment  Vision Impairment : Yes  Right Eye Vision: Wears Glasses  Left Eye Vision: Wears Glasses  Hearing Impairment : Yes  Hearing  Impairment: Left Ear    Values / Beliefs / Concerns  Values / Beliefs Concerns : Yes (Jewish)    Advance Directive  Advance Directive?: None  Advance Directive offered?: AD Booklet refused    Domestic Abuse  Have you ever been the victim of abuse or violence?: No  Physical Abuse or Sexual Abuse: No  Verbal Abuse or Emotional Abuse: No  Possible Abuse/Neglect Reported to:: Not Applicable       Anticipated Discharge Information  Discharge Disposition: Discharged to home/self care (01)  Discharge Address: [address] [place] [id number]  Discharge Contact Phone Number: [phone]      Patient lives with daughter Ava who helps her with any ADLS /IADLS. Patient gets transport from [facility] or [facility].  Patient does not drive.      ID following. Patient on IV Zosyn. Will change to oral abx upon discharge to home.    Elsa FORBES RN Case Manager  [phone]

## 2023-04-20 NOTE — PROGRESS NOTES
Assumed care of patient. AO x0. With no complaints of pain . Routine assessment done. Vital signs within normal limits. Call light within reach and personal belongings within reach. Bed locked and in lowest position. Policies and procedures reinforced patient verbalized understanding.Treaded socks in place. Will continue to monitor.

## 2023-04-20 NOTE — CARE PLAN
The patient is Stable - Low risk of patient condition declining or worsening    Shift Goals  Clinical Goals: Pain management  Patient Goals: Comfort    Progress made toward(s) clinical / shift goals:    Problem: Pain - Standard  Goal: Alleviation of pain or a reduction in pain to the patient’s comfort goal  Outcome: Progressing     Problem: Knowledge Deficit - Standard  Goal: Patient and family/care givers will demonstrate understanding of plan of care, disease process/condition, diagnostic tests and medications  Outcome: Progressing       Patient is not progressing towards the following goals:

## 2023-04-20 NOTE — PROGRESS NOTES
[facility] Medicine Daily Progress Note    Date of Service  4/20/2023    Chief Complaint  April Alicia is a 63 y.o. female admitted 4/18/2023 with left knee pain.    Hospital Course  April Alicia is a 63 y.o. female with PMH of alcohol, cocaine abuse (currently does not use), cirrhosis, portal hypertension, atrial fibrillation on apixaban, type 2 diabetes mellitus, hypertension, and a complex left knee prosthetic joint infection who presented 4/18/2023 that presented to the ED for suspicion for failing outpatient antibiotic treatment.     Patient was admitted on July 2022 with a group B strep bacteremia, left knee prosthetic joint infection, left shoulder septic arthritis, and subdeltoid abscess.  Plan was for discharge to skilled nursing facility and continue IV ceftriaxone for 6 weeks until 8/30/2022.  Due to some unknown reason, patient only received 4 weeks of antibiotics.  Patient was readmitted on 12/26/2022 due to recurrent left knee pain and swelling.  Was taken back to the OR on 12/28 underwent explantation and placement of articulating spacer and wound VAC.  Multiple OR cultures grew group B Strep.  Underwent synovial fluid removal from the left shoulder on 1/2/2022 which showed WBC 1051, 40% polys, 40% lymphs, not consistent with infection.  Plan was to discharge with 6 weeks of IV antibiotics (ceftriaxone 2 g daily till 2/7/2023).  Per ID documentation, orthopedic MD Garcia recommended oral antibiotics for 1 year prior to reimplantation (status post 2 I's and D's in the past with 2 antibiotic spacers which failed).  Patient was started on Augmentin twice daily on 2/15.  Patient reporting compliance.  Patient had ID appointment on 4/18 and reported general malaise, fatigue, drowsiness, decreased appetite, and left knee swelling for approximately 2 weeks.  Due to concern of failed outpatient treatment, ID recommended presenting to ED ASAP.     In the ED, vital signs within normal  limits.  CBC remarkable for WBC 4.7, hemoglobin 11.5, platelet count 87.  ESR 23.  CMP remarkable for Alk phos 258, albumin 2.8, globulin 3.8.  Lipase 27.  Lactic acid 0.8.  UA unremarkable.  CRP 0.3.  CT A/P demonstrated cirrhosis and portal hypertension, including splenomegaly and portal systemic collaterals; cardiomegaly.  CT left lower extremity pending.      General surgeon, Dr. Oviedo, consulted due to abdominal pain with CT findings.  No indication for acute surgical intervention.  HIDA scan pending.  Orthopedic surgeon, Dr. Luz, consulted who recommended IV antibiotics.  Placed on IV Zosyn.  ID consulted.  Awaiting recommendations.    Interval Problem Update  4/19 Orthopedic surgery consulted.  -ID consulted.  -General surgery consulted.  -Pending HIDA scan.    4/20 minimal abdominal pain  Ongoing knee pain  Avoid narcotics due to need for HIDA scan  Pending PT OT evaluation, may need placement    I have discussed this patient's plan of care and discharge plan at IDT rounds today with Case Management, Nursing, Nursing leadership, and other members of the IDT team.    Consultants/Specialty  general surgery, infectious disease, and orthopedics    Code Status  Full Code    Disposition  Patient is not medically cleared for discharge.   Anticipate discharge to to home with organized home healthcare and close outpatient follow-up.  I have placed the appropriate orders for post-discharge needs.    Review of Systems  Review of Systems   Constitutional:  Negative for chills, fever and malaise/fatigue.   HENT:  Negative for congestion.    Eyes:  Negative for pain.   Respiratory:  Negative for cough and shortness of breath.    Cardiovascular:  Negative for chest pain, palpitations and leg swelling.   Gastrointestinal:  Positive for abdominal pain. Negative for diarrhea, nausea and vomiting.   Genitourinary:  Negative for dysuria, frequency and urgency.   Musculoskeletal:  Positive for joint pain and myalgias.   Skin:   Negative for rash.   Neurological:  Negative for dizziness, weakness and headaches.   Psychiatric/Behavioral:  The patient does not have insomnia.       Physical Exam  Temp:  [36.6 °C (97.8 °F)-37.2 °C (99 °F)] 37.2 °C (99 °F)  Pulse:  [61-71] 70  Resp:  [16-18] 18  BP: (118-134)/(64-78) 134/72  SpO2:  [95 %-98 %] 95 %    Physical Exam  Vitals and nursing note reviewed.   Constitutional:       General: She is not in acute distress.     Appearance: Normal appearance. She is ill-appearing. She is not toxic-appearing or diaphoretic.   HENT:      Head: Normocephalic and atraumatic.      Right Ear: External ear normal.      Left Ear: External ear normal.      Nose: Nose normal.      Mouth/Throat:      Mouth: Mucous membranes are moist.      Pharynx: Oropharynx is clear.   Eyes:      General: No scleral icterus.     Extraocular Movements: Extraocular movements intact.      Conjunctiva/sclera: Conjunctivae normal.      Pupils: Pupils are equal, round, and reactive to light.   Cardiovascular:      Rate and Rhythm: Normal rate and regular rhythm.      Pulses: Normal pulses.      Comments: Holosystolic murmur.  Pulmonary:      Effort: Pulmonary effort is normal. No respiratory distress.      Breath sounds: Normal breath sounds.   Abdominal:      General: Bowel sounds are normal. There is no distension.      Palpations: Abdomen is soft. There is no mass.      Tenderness: There is generalized abdominal tenderness. There is no guarding or rebound.   Musculoskeletal:         General: No swelling or tenderness. Normal range of motion.      Cervical back: Normal range of motion.      Right lower leg: No edema.      Left lower leg: Edema present.      Comments: Edema and tenderness to left knee.     Skin:     General: Skin is warm and dry.      Coloration: Skin is not jaundiced or pale.      Findings: No bruising.   Neurological:      General: No focal deficit present.      Mental Status: She is alert and oriented to person, place,  and time. Mental status is at baseline.      Cranial Nerves: No cranial nerve deficit.      Sensory: No sensory deficit.      Motor: Weakness present.   Psychiatric:         Mood and Affect: Mood normal.         Behavior: Behavior normal.         Thought Content: Thought content normal.         Judgment: Judgment normal.     Fluids  No intake or output data in the 24 hours ending 04/20/23 1248    Laboratory  Recent Labs     04/18/23  1538 04/19/23  0336 04/20/23  0207   WBC 4.7* 3.7* 3.9*   RBC 4.12* 3.90* 4.14*   HEMOGLOBIN 11.5* 10.8* 11.6*   HEMATOCRIT 36.2* 33.8* 35.3*   MCV 87.9 86.7 85.3   MCH 27.9 27.7 28.0   MCHC 31.8* 32.0* 32.9*   RDW 59.2* 58.3* 56.9*   PLATELETCT 87* 85* 94*   MPV 10.1 10.5 10.6     Recent Labs     04/18/23  1538 04/19/23  0336 04/20/23  0207   SODIUM 139 140 138   POTASSIUM 4.6 4.7 4.3   CHLORIDE 108 112 109   CO2 24 22 19*   GLUCOSE 102* 89 101*   BUN 20 16 17   CREATININE 1.02 0.88 1.04   CALCIUM 8.5 8.4* 8.1*     Recent Labs     04/19/23  1302   INR 1.36*         Recent Labs     04/19/23  0336   TRIGLYCERIDE 37   HDL 45   LDL 30     Imaging  CT-KNEE WITH PLUS RECONS LEFT   Final Result      1.  Previous left total knee replacement and fusion. No evidence of periprosthetic fracture or loosening.      CT-EXTREMITY, LOWER WITH RIGHT   Final Result      1.  RIGHT knee prosthesis is normally aligned.   2.  No gross fracture or focal bone destruction.   3.  No significant joint effusion or soft tissue gas.   4.  Artifact limits exam.      CT-ABDOMEN-PELVIS WITH   Final Result      1.  No acute abnormality in the abdomen or pelvis.   2.  Cirrhosis and portal hypertension, including splenomegaly and portosystemic collaterals. No hepatic mass lesions detected.   3.  Cardiomegaly.      US-RUQ   Final Result      1.  Hepatic steatosis and likely cirrhosis.      2.  Dilatation of the main portal vein suggestive of portal hypertension.      3.  Slight thickening of gallbladder wall 3.5 mm which  may be secondary to hypoproteinemia or hypoalbuminemia      DX-CHEST-PORTABLE (1 VIEW)   Final Result      1.  No acute cardiac or pulmonary abnormalities are identified.      2.  Old granulomatous disease.      NM-BILIARY (HIDA) SCAN WITH CCK    (Results Pending)      Assessment/Plan  * Left knee pain- (present on admission)  Assessment & Plan  -Status post left knee prosthetic joint infection 7/2022 + GBS bacteremia.     -On IV ceftriaxone for 4 weeks instead of 6.    -Readmitted 12/2022 due to recurrence.     -I&D 12/28th with for placement of articulating spacer, wound VAC; cultures positive for GBS.     -Placed on IV ceftriaxone until 2/2023.    -Ortho recommended antibiotics for 1 year before reimplantation.     -Was on Augmentin twice daily since 2/15.    -Continue IV Zosyn.  -CT left lower extremity pending.  -Orthopedic surgery consulted.  -ID consulted.    4/20 continue IV antibiotics, ID following  PT OT evaluation, may need skilled placement    Thickening of wall of gallbladder  Assessment & Plan  -Suspect secondary to cirrhosis.  -CT A/P showed gallbladder wall thickening, no calcified gallstones.  -General surgery consulted.   -No indication for acute surgical intervention.  -HIDA scan pending.    4/20 HIDA scan pending, surgery to follow-up    Pancytopenia (HCC)  Assessment & Plan  -Suspect secondary to cirrhosis.    -History of drug-induced (Rocephin in 2022).  -Transfusion if hemoglobin less than 7.  -Transfusion if platelets less than 20 or evidence of active bleeding.    Hyperlipidemia- (present on admission)  Assessment & Plan  -Lipid panel remarkable for total cholesterol 82.  -Continue outpatient atorvastatin.    Paroxysmal atrial fibrillation, hx of- (present on admission)  Assessment & Plan  -[id number] Score: 3.    -Was on apixaban which was discontinue due to bleeding.   -Last cardiology appt on 3/8/23.    -30-day Biotel monitor showed frequent PACs, greater than 7%, no atrial  fibrillation was noted.    Cirrhosis (HCC)- (present on admission)  Assessment & Plan  -Suspect secondary to past chronic alcohol drinking.    -CT A/P demonstrated cirrhosis, portal hypertension, splenomegaly, and portal systemic collaterals.   -Alk phos elevated.  -AST/ALT and tbili WNL.  -Pending INR, unable to calculate MELD.    Hypertension- (present on admission)  Assessment & Plan  -Controlled.    -Restarted outpatient Losartan.  -Continue as needed hydralazine.    Controlled type 2 diabetes mellitus with hyperglycemia, without long-term current use of insulin (HCC)- (present on admission)  Assessment & Plan  -Hemoglobin A1c 5.4% on 1/2023.    -Continue sliding scale insulin with hypoglycemic protocol.    History of rheumatic fever- (present on admission)  Assessment & Plan  -History of rheumatic fever at age 26.  -ECHO on 7/2022 demonstrated EF 60%, with moderate concentric left ventricular hypertrophy, mild mitral regurgitation.    VTE prophylaxis: SCDs/TEDs    I have performed a physical exam and reviewed and updated ROS and Plan today (4/20/2023). In review of yesterday's note (4/19/2023), there are no changes except as documented above.

## 2023-04-20 NOTE — PROGRESS NOTES
Infectious Disease Progress Note    Author: Griselda Vazquez M.D. Date & Time of service: 2023  12:05 PM    Chief Complaint:   infected knee arthroplasty chronic    Interval History:  63 y.o. diabetic female admitted 2023 for abd pain X almost 2 weeks, knee pain and swelling. 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 No aspiration done  Labs Reviewed, Medications Reviewed, and Wound Reviewed.    Review of Systems:  Review of Systems   Constitutional:  Negative for fever.   Gastrointestinal:  Positive for abdominal pain. Negative for diarrhea, nausea and vomiting.   Musculoskeletal:  Positive for joint pain and myalgias.     Hemodynamics:  Temp (24hrs), Av.8 °C (98.3 °F), Min:36.6 °C (97.8 °F), Max:37.2 °C (99 °F)  Temperature: 37.2 °C (99 °F)  Pulse  Av  Min: 56  Max: 92   Blood Pressure: 134/72       Physical Exam:  Physical Exam  Vitals and nursing note reviewed.   Constitutional:       General: She is not in acute distress.     Appearance: She is not ill-appearing, toxic-appearing or diaphoretic.   Eyes:      General: No scleral icterus.  Abdominal:      General: There is no distension.      Palpations: Abdomen is soft.      Tenderness: There is abdominal tenderness. There is no guarding or rebound.   Musculoskeletal:         General: Swelling and deformity present.   Skin:     Comments: Ant scars bilateral knees       Meds:    Current Facility-Administered Medications:     [COMPLETED] piperacillin-tazobactam **AND** piperacillin-tazobactam    losartan    senna-docusate **AND** polyethylene glycol/lytes **AND** magnesium hydroxide **AND** bisacodyl    Respiratory Therapy Consult    acetaminophen    hydrALAZINE    ondansetron    ondansetron    promethazine    promethazine    prochlorperazine    insulin regular **AND** POC blood glucose manual result **AND** NOTIFY MD and PharmD **AND**  Administer 20 grams of glucose (approximately 8 ounces of fruit juice) every 15 minutes PRN FSBG less than 70 mg/dL **AND** dextrose bolus    Pharmacy Consult Request    celecoxib **FOLLOWED BY** [START ON 4/23/2023] celecoxib    [Held by provider] oxyCODONE immediate-release **OR** [Held by provider] oxyCODONE immediate-release **OR** [Held by provider] HYDROmorphone    atorvastatin    Labs:  Recent Labs     04/18/23 1538 04/19/23 0336 04/20/23  0207   WBC 4.7* 3.7* 3.9*   RBC 4.12* 3.90* 4.14*   HEMOGLOBIN 11.5* 10.8* 11.6*   HEMATOCRIT 36.2* 33.8* 35.3*   MCV 87.9 86.7 85.3   MCH 27.9 27.7 28.0   RDW 59.2* 58.3* 56.9*   PLATELETCT 87* 85* 94*   MPV 10.1 10.5 10.6   NEUTSPOLYS 59.10 45.20 46.30   LYMPHOCYTES 28.30 39.20 39.00   MONOCYTES 7.90 9.50 6.70   EOSINOPHILS 3.20 4.40 5.60   BASOPHILS 1.10 1.40 2.10*     Recent Labs     04/18/23  1538 04/19/23  0336 04/20/23  0207   SODIUM 139 140 138   POTASSIUM 4.6 4.7 4.3   CHLORIDE 108 112 109   CO2 24 22 19*   GLUCOSE 102* 89 101*   BUN 20 16 17     Recent Labs     04/18/23 1538 04/19/23  0336 04/20/23  0207   ALBUMIN 2.8* 2.7*  --    TBILIRUBIN 0.5 0.5  --    ALKPHOSPHAT 258* 216*  --    TOTPROTEIN 6.6 5.9*  --    ALTSGPT 25 21  --    ASTSGOT 31 27  --    CREATININE 1.02 0.88 1.04       Imaging:  CT-ABDOMEN-PELVIS WITH    Result Date: 4/18/2023 4/18/2023 5:29 PM HISTORY/REASON FOR EXAM:  Abdominal pain, fever, sepsis. TECHNIQUE/EXAM DESCRIPTION:   CT scan of the abdomen and pelvis with contrast. Contrast-enhanced helical scanning was obtained from the diaphragmatic domes through the pubic symphysis following the bolus administration of nonionic contrast without complication. 100 mL of Omnipaque 350 nonionic contrast was administered without complication. Low dose optimization technique was utilized for this CT exam including automated exposure control and adjustment of the mA and/or kV according to patient size. COMPARISON: 1/7/2023. FINDINGS: Lower chest: Lung  bases are clear. Cardiomegaly. Liver: Cirrhotic. Recanalized paraumbilical vein. No hepatic mass lesions are detected. No intrahepatic biliary dilatation. Gallbladder: Gallbladder wall thickening, related to cirrhosis. No calcified gallstones. Common bile duct: Nondilated. Pancreas: Unremarkable. Spleen: Measures 16 cm in the greatest AP dimension. No mass. Adrenals: No mass. Kidneys: No suspicious mass lesions. No hydronephrosis. Stomach, small bowel, colon: No bowel wall thickening or obstruction. Peritoneal cavity: No significant ascites. Lymph nodes: No enlarged nodes by size criteria. Aorta: No aneurysm. Pelvic organs: Hysterectomy. Musculoskeletal structures: No acute fracture or destructive lesion.     1.  No acute abnormality in the abdomen or pelvis. 2.  Cirrhosis and portal hypertension, including splenomegaly and portosystemic collaterals. No hepatic mass lesions detected. 3.  Cardiomegaly.    CT-EXTREMITY, LOWER WITH RIGHT    Result Date: 4/18/2023 4/18/2023 5:29 PM HISTORY/REASON FOR EXAM:  ct of knee/leg requested by Infectious Disease (pt with hx of septic joint/hardware removal/new fevers). TECHNIQUE/EXAM DESCRIPTION AND NUMBER OF VIEWS:  CT scan of the RIGHT lower extremity with contrast, with reconstructions. Thin helical 3 mm sections were obtained from the distal femur through the proximal tibia/fibula. Sagittal and coronal multiplanar reconstructions were generated from the axial images. A total of 100 mL of Omnipaque 350 nonionic contrast was administered  IV without complication. Up to date radiation dose reduction adjustments have been utilized to meet ALARA standards for radiation dose reduction. COMPARISON: None. FINDINGS: Knee prosthesis in place, normally aligned.  Artifact limits exam. No gross joint effusion. No focal abnormal fluid collection to indicate abscess. No soft tissue gas. Visualized arterial structures enhance normally.     1.  RIGHT knee prosthesis is normally aligned. 2.   No gross fracture or focal bone destruction. 3.  No significant joint effusion or soft tissue gas. 4.  Artifact limits exam.    CT-KNEE WITH PLUS RECONS LEFT    Result Date: 4/19/2023 4/19/2023 3:29 PM HISTORY/REASON FOR EXAM:  left knee pain. TECHNIQUE/ EXAM DESCRIPTION AND NUMBER OF VIEWS:  CT scan of the LEFT knee with contrast, with reconstructions. Thin helical sections were obtained from the distal femur through the proximal tibia/fibula. Sagittal and coronal reconstructions were generated from the axial images. A total of 75 mL of Omnipaque 350 nonionic contrast was administered IV without complication. Up to date radiation dose reduction adjustments have been utilized to meet ALARA standards for radiation dose reduction. COMPARISON:  None. FINDINGS: Left total knee replacement/fusion. There is no evidence of periprosthetic fracture or loosening. Shiley there are tubular areas of methylmethacrylate in the distal femur and mid and distal tibia.     1.  Previous left total knee replacement and fusion. No evidence of periprosthetic fracture or loosening.    DX-CHEST-PORTABLE (1 VIEW)    Result Date: 4/18/2023 4/18/2023 3:43 PM HISTORY/REASON FOR EXAM:  Sepsis. TECHNIQUE/EXAM DESCRIPTION AND NUMBER OF VIEWS: Single portable view of the chest. COMPARISON: Chest x-ray 1/5/2023 FINDINGS: Heart size is moderately enlarged. No pulmonary infiltrates or consolidations are noted. No pleural abnormalities are noted. There are coarse calcific densities in the right paratracheal region.     1.  No acute cardiac or pulmonary abnormalities are identified. 2.  Old granulomatous disease.    US-RUQ    Result Date: 4/18/2023 4/18/2023 4:16 PM HISTORY/REASON FOR EXAM:  Abdominal pain TECHNIQUE/EXAM DESCRIPTION AND NUMBER OF VIEWS:  Real-time sonography of the liver and biliary tree. COMPARISON: Right upper quadrant ultrasound 2/8/2019 FINDINGS: The liver is normal in contour with increased echogenicity. There is no evidence of  "solid mass lesion. The liver measures 14.13 cm. The gallbladder is normal. There is no evidence of cholelithiasis. The gallbladder wall thickness measures 0.35 cm. There is no pericholecystic fluid. The common duct measures 0.58 cm. The visualized pancreas is unremarkable. The visualized aorta is normal in caliber. Intrahepatic IVC is patent. The portal vein is patent with hepatopetal flow. The MPV measures 1.73 cm. The right kidney measures 10.26 cm. There is no ascites.     1.  Hepatic steatosis and likely cirrhosis. 2.  Dilatation of the main portal vein suggestive of portal hypertension. 3.  Slight thickening of gallbladder wall 3.5 mm which may be secondary to hypoproteinemia or hypoalbuminemia      Micro:  Results       Procedure Component Value Units Date/Time    Urine Culture (NEW) [[id number]] Collected: 04/18/23 1702    Order Status: Completed Specimen: Urine Updated: 04/20/23 0654     Significant Indicator NEG     Source UR     Site -     Culture Result Usual urogenital rubens <10,000 cfu/mL    Narrative:      Indication for culture:->Evaluation for sepsis without a  clear source of infection  Indication for culture:->Evaluation for sepsis without a    Blood Culture [[id number]] Collected: 04/18/23 1651    Order Status: Completed Specimen: Blood from Peripheral Updated: 04/19/23 0923     Significant Indicator NEG     Source BLD     Site PERIPHERAL     Culture Result No Growth  Note: Blood cultures are incubated for 5 days and  are monitored continuously.Positive blood cultures  are called to the RN and reported as soon as  they are identified.      Narrative:      1 of 2 for Blood Culture x 2 sites order. Per Hospital  Policy: Only change Specimen Src: to \"Line\" if specified by  physician order.  Left Forearm/Arm    Blood Culture [[id number]] Collected: 04/18/23 1538    Order Status: Completed Specimen: Blood from Peripheral Updated: 04/19/23 0923     Significant Indicator NEG     Source BLD     Site " "PERIPHERAL     Culture Result No Growth  Note: Blood cultures are incubated for 5 days and  are monitored continuously.Positive blood cultures  are called to the RN and reported as soon as  they are identified.      Narrative:      2 of 2 blood culture x2  Sites order. Per Hospital Policy:  Only change Specimen Src: to \"Line\" if specified by physician  order.  Left AC    MRSA By PCR (Amp) [[id number]] Collected: 04/18/23 2350    Order Status: Completed Specimen: Respirate from Nares Updated: 04/19/23 0717     MRSA by PCR Negative    Urinalysis [[id number]] Collected: 04/18/23 1702    Order Status: Completed Specimen: Urine Updated: 04/18/23 1724     Color Yellow     Character Clear     Specific Gravity 1.012     Ph 8.0     Glucose Negative mg/dL      Ketones Negative mg/dL      Protein Negative mg/dL      Bilirubin Negative     Urobilinogen, Urine 0.2     Nitrite Negative     Leukocyte Esterase Negative     Occult Blood Negative     Micro Urine Req see below     Comment: Microscopic examination not performed when specimen is clear  and chemically negative for protein, blood, leukocyte esterase  and nitrite.         Narrative:      Indication for culture:->Evaluation for sepsis without a  clear source of infection            Assessment:  Active Hospital Problems    Diagnosis     *Left knee pain [M25.562]     Thickening of wall of gallbladder [K82.8]     Epigastric pain [R10.13]     Pancytopenia (HCC) [D61.818]     Hyperlipidemia [E78.5]     Paroxysmal atrial fibrillation, hx of [I48.0]     Cirrhosis (HCC) [K74.60]     Controlled type 2 diabetes mellitus with hyperglycemia, without long-term current use of insulin (HCC) [E11.65]     Hypertension [I10]     History of rheumatic fever [Z86.79]      Chronic infection left knee arthroplasty, GBS-per patient more swollen prior to admission  Not much on exam currently and unclear if failure of suppression  ABd pain  Cirrhosis/portal HTN NO ascites  Leukopenia  DM     PLAN: "   FU blood cultures neg  FU HIDA  Continue Zosyn for now  Recommend synovial fluid analysis if effusion present  Keep BS under 150 to help control current infection  MIDLINE/PICC  no-Anticipate switch to PO at discharge  Final antibiotic recommendations per culture results and clinical course

## 2023-04-20 NOTE — PROGRESS NOTES
"  DATE: 4/20/2023    Hospital day 3 abdominal pain.    INTERVAL EVENTS:  HIDA pending, pt received narcotics this morning so HIDA rescheduled for tomorrow.    REVIEW OF SYSTEMS:  Ongoing epigastric pain, hungry and asking for breakfast. States voiding well and passing flatus.    PHYSICAL EXAMINATION:    Vital Signs: /72   Pulse 70   Temp 37.2 °C (99 °F) (Temporal)   Resp 18   Ht 1.727 m (5' 8\")   Wt 61.1 kg (134 lb 11.2 oz)   SpO2 95%   Physical Exam  Vitals and nursing note reviewed.   Constitutional:       General: She is awake. She is not in acute distress.     Appearance: She is well-developed.   Pulmonary:      Effort: Pulmonary effort is normal. No respiratory distress.   Abdominal:      Tenderness: There is abdominal tenderness.   Skin:     Coloration: Skin is jaundiced.   Neurological:      Mental Status: She is alert. Mental status is at baseline.   Psychiatric:         Behavior: Behavior is cooperative.       LABORATORY VALUES:   Recent Labs     04/18/23  1538 04/19/23  0336 04/20/23  0207   WBC 4.7* 3.7* 3.9*   RBC 4.12* 3.90* 4.14*   HEMOGLOBIN 11.5* 10.8* 11.6*   HEMATOCRIT 36.2* 33.8* 35.3*   MCV 87.9 86.7 85.3   MCH 27.9 27.7 28.0   MCHC 31.8* 32.0* 32.9*   RDW 59.2* 58.3* 56.9*   PLATELETCT 87* 85* 94*   MPV 10.1 10.5 10.6     Recent Labs     04/18/23  1538 04/19/23  0336 04/20/23  0207   SODIUM 139 140 138   POTASSIUM 4.6 4.7 4.3   CHLORIDE 108 112 109   CO2 24 22 19*   GLUCOSE 102* 89 101*   BUN 20 16 17   CREATININE 1.02 0.88 1.04   CALCIUM 8.5 8.4* 8.1*     Recent Labs     04/18/23  1538 04/19/23  0336 04/19/23  1302   ASTSGOT 31 27  --    ALTSGPT 25 21  --    TBILIRUBIN 0.5 0.5  --    ALKPHOSPHAT 258* 216*  --    GLOBULIN 3.8* 3.2  --    INR  --   --  1.36*     Recent Labs     04/19/23  1302   INR 1.36*        IMAGING:   CT-KNEE WITH PLUS RECONS LEFT   Final Result      1.  Previous left total knee replacement and fusion. No evidence of periprosthetic fracture or loosening.    "   CT-EXTREMITY, LOWER WITH RIGHT   Final Result      1.  RIGHT knee prosthesis is normally aligned.   2.  No gross fracture or focal bone destruction.   3.  No significant joint effusion or soft tissue gas.   4.  Artifact limits exam.      CT-ABDOMEN-PELVIS WITH   Final Result      1.  No acute abnormality in the abdomen or pelvis.   2.  Cirrhosis and portal hypertension, including splenomegaly and portosystemic collaterals. No hepatic mass lesions detected.   3.  Cardiomegaly.      US-RUQ   Final Result      1.  Hepatic steatosis and likely cirrhosis.      2.  Dilatation of the main portal vein suggestive of portal hypertension.      3.  Slight thickening of gallbladder wall 3.5 mm which may be secondary to hypoproteinemia or hypoalbuminemia      DX-CHEST-PORTABLE (1 VIEW)   Final Result      1.  No acute cardiac or pulmonary abnormalities are identified.      2.  Old granulomatous disease.      NM-BILIARY (HIDA) SCAN WITH CCK    (Results Pending)       ASSESSMENT AND PLAN:     Epigastric pain- (present on admission)  Assessment & Plan  1.5 weeks of abdominal pain associated with diarrhea, nausea, and vomiting.  Labs with elevation of alk phos, no leukocytosis.  Imaging with mild thickening of the gallbladder wall; favor cirrhosis as etiology; no pericholecystic fluid or evidence of gallbladder inflammation; no cholelithiasis.  No indications for acute surgical intervention at this time.  Concern for cholecystitis low.  Unable to calculate MELD without INR however the presence of multiple large anterior abdominal collaterals makes abdominal surgery perilous in this patient.  4/19 HIDA pending.  4/20 Pt received narcotics this morning. HIDA rescheduled for 4/21.  [facility] "[employer]             ____________________________________     EDITH Griffin.    DD: 4/20/2023  8:58 AM

## 2023-04-20 NOTE — CARE PLAN
The patient is Stable - Low risk of patient condition declining or worsening    Shift Goals  Clinical Goals: Rest  Patient Goals: Rest    Progress made toward(s) clinical / shift goals:  Patient resting.    Patient is not progressing towards the following goals:

## 2023-04-21 ENCOUNTER — APPOINTMENT (OUTPATIENT)
Dept: RADIOLOGY | Facility: MEDICAL CENTER | Age: 63
DRG: 560 | End: 2023-04-21
Attending: NURSE PRACTITIONER
Payer: MEDICAID

## 2023-04-21 PROBLEM — M25.561 CHRONIC PAIN OF BOTH KNEES: Status: ACTIVE | Noted: 2023-04-18

## 2023-04-21 PROBLEM — G89.29 CHRONIC PAIN OF BOTH KNEES: Status: ACTIVE | Noted: 2023-04-18

## 2023-04-21 LAB
GLUCOSE BLD STRIP.AUTO-MCNC: 107 MG/DL (ref 65–99)
GLUCOSE BLD STRIP.AUTO-MCNC: 123 MG/DL (ref 65–99)
GLUCOSE BLD STRIP.AUTO-MCNC: 135 MG/DL (ref 65–99)
GLUCOSE BLD STRIP.AUTO-MCNC: 85 MG/DL (ref 65–99)

## 2023-04-21 PROCEDURE — 700111 HCHG RX REV CODE 636 W/ 250 OVERRIDE (IP): Performed by: INTERNAL MEDICINE

## 2023-04-21 PROCEDURE — 700102 HCHG RX REV CODE 250 W/ 637 OVERRIDE(OP): Performed by: STUDENT IN AN ORGANIZED HEALTH CARE EDUCATION/TRAINING PROGRAM

## 2023-04-21 PROCEDURE — 700105 HCHG RX REV CODE 258: Performed by: STUDENT IN AN ORGANIZED HEALTH CARE EDUCATION/TRAINING PROGRAM

## 2023-04-21 PROCEDURE — A9270 NON-COVERED ITEM OR SERVICE: HCPCS | Performed by: NURSE PRACTITIONER

## 2023-04-21 PROCEDURE — 99233 SBSQ HOSP IP/OBS HIGH 50: CPT | Performed by: INTERNAL MEDICINE

## 2023-04-21 PROCEDURE — 99232 SBSQ HOSP IP/OBS MODERATE 35: CPT | Performed by: NURSE PRACTITIONER

## 2023-04-21 PROCEDURE — 82962 GLUCOSE BLOOD TEST: CPT

## 2023-04-21 PROCEDURE — 99231 SBSQ HOSP IP/OBS SF/LOW 25: CPT | Performed by: INTERNAL MEDICINE

## 2023-04-21 PROCEDURE — 78227 HEPATOBIL SYST IMAGE W/DRUG: CPT

## 2023-04-21 PROCEDURE — 700111 HCHG RX REV CODE 636 W/ 250 OVERRIDE (IP): Performed by: STUDENT IN AN ORGANIZED HEALTH CARE EDUCATION/TRAINING PROGRAM

## 2023-04-21 PROCEDURE — 700102 HCHG RX REV CODE 250 W/ 637 OVERRIDE(OP): Performed by: NURSE PRACTITIONER

## 2023-04-21 PROCEDURE — A9270 NON-COVERED ITEM OR SERVICE: HCPCS | Performed by: STUDENT IN AN ORGANIZED HEALTH CARE EDUCATION/TRAINING PROGRAM

## 2023-04-21 PROCEDURE — 97161 PT EVAL LOW COMPLEX 20 MIN: CPT

## 2023-04-21 PROCEDURE — 770006 HCHG ROOM/CARE - MED/SURG/GYN SEMI*

## 2023-04-21 PROCEDURE — 97165 OT EVAL LOW COMPLEX 30 MIN: CPT

## 2023-04-21 RX ORDER — FAMOTIDINE 20 MG/1
20 TABLET, FILM COATED ORAL DAILY
Status: DISCONTINUED | OUTPATIENT
Start: 2023-04-22 | End: 2023-04-23 | Stop reason: HOSPADM

## 2023-04-21 RX ADMIN — CELECOXIB 200 MG: 200 CAPSULE ORAL at 05:27

## 2023-04-21 RX ADMIN — ATORVASTATIN CALCIUM 20 MG: 20 TABLET, FILM COATED ORAL at 21:12

## 2023-04-21 RX ADMIN — OXYCODONE 5 MG: 5 TABLET ORAL at 13:16

## 2023-04-21 RX ADMIN — OXYCODONE 5 MG: 5 TABLET ORAL at 23:46

## 2023-04-21 RX ADMIN — CELECOXIB 200 MG: 200 CAPSULE ORAL at 18:26

## 2023-04-21 RX ADMIN — LOSARTAN POTASSIUM 100 MG: 50 TABLET, FILM COATED ORAL at 05:27

## 2023-04-21 RX ADMIN — PIPERACILLIN AND TAZOBACTAM 3.38 G: 3; .375 INJECTION, POWDER, LYOPHILIZED, FOR SOLUTION INTRAVENOUS; PARENTERAL at 21:17

## 2023-04-21 RX ADMIN — OXYCODONE 5 MG: 5 TABLET ORAL at 18:26

## 2023-04-21 RX ADMIN — DOCUSATE SODIUM 50 MG AND SENNOSIDES 8.6 MG 2 TABLET: 8.6; 5 TABLET, FILM COATED ORAL at 18:27

## 2023-04-21 RX ADMIN — PIPERACILLIN AND TAZOBACTAM 3.38 G: 3; .375 INJECTION, POWDER, LYOPHILIZED, FOR SOLUTION INTRAVENOUS; PARENTERAL at 05:26

## 2023-04-21 RX ADMIN — KETOROLAC TROMETHAMINE 15 MG: 30 INJECTION, SOLUTION INTRAMUSCULAR; INTRAVENOUS at 10:50

## 2023-04-21 RX ADMIN — DOCUSATE SODIUM 50 MG AND SENNOSIDES 8.6 MG 2 TABLET: 8.6; 5 TABLET, FILM COATED ORAL at 05:27

## 2023-04-21 RX ADMIN — PIPERACILLIN AND TAZOBACTAM 3.38 G: 3; .375 INJECTION, POWDER, LYOPHILIZED, FOR SOLUTION INTRAVENOUS; PARENTERAL at 14:43

## 2023-04-21 ASSESSMENT — COGNITIVE AND FUNCTIONAL STATUS - GENERAL
MOBILITY SCORE: 24
DAILY ACTIVITIY SCORE: 24
SUGGESTED CMS G CODE MODIFIER DAILY ACTIVITY: CH
SUGGESTED CMS G CODE MODIFIER MOBILITY: CH

## 2023-04-21 ASSESSMENT — ENCOUNTER SYMPTOMS
DIZZINESS: 0
DEPRESSION: 0
INSOMNIA: 0
WEAKNESS: 0
DIARRHEA: 0
PALPITATIONS: 0
FEVER: 0
COUGH: 0
NAUSEA: 0
MYALGIAS: 1
HEARTBURN: 1
HEADACHES: 0
CHILLS: 0
VOMITING: 0
SHORTNESS OF BREATH: 0
ABDOMINAL PAIN: 1
ROS GI COMMENTS: DECREASED

## 2023-04-21 ASSESSMENT — PATIENT HEALTH QUESTIONNAIRE - PHQ9
SUM OF ALL RESPONSES TO PHQ9 QUESTIONS 1 AND 2: 0
1. LITTLE INTEREST OR PLEASURE IN DOING THINGS: NOT AT ALL
2. FEELING DOWN, DEPRESSED, IRRITABLE, OR HOPELESS: NOT AT ALL

## 2023-04-21 ASSESSMENT — PAIN DESCRIPTION - PAIN TYPE
TYPE: CHRONIC PAIN

## 2023-04-21 ASSESSMENT — GAIT ASSESSMENTS
GAIT LEVEL OF ASSIST: SUPERVISED
ASSISTIVE DEVICE: 4 WHEEL WALKER
DISTANCE (FEET): 350

## 2023-04-21 ASSESSMENT — ACTIVITIES OF DAILY LIVING (ADL): TOILETING: INDEPENDENT

## 2023-04-21 NOTE — CARE PLAN
The patient is Stable - Low risk of patient condition declining or worsening    Shift Goals  Clinical Goals: Rest  Patient Goals: Rest    Progress made toward(s) clinical / shift goals:    Problem: Pain - Standard  Goal: Alleviation of pain or a reduction in pain to the patient’s comfort goal  Outcome: Progressing     Problem: Knowledge Deficit - Standard  Goal: Patient and family/care givers will demonstrate understanding of plan of care, disease process/condition, diagnostic tests and medications  Outcome: Progressing       Patient is not progressing towards the following goals:

## 2023-04-21 NOTE — PROGRESS NOTES
Infectious Disease Progress Note    Author: Griselda Vazquez M.D. Date & Time of service: 2023  12:31 PM    Chief Complaint:   infected knee arthroplasty chronic    Interval History:  63 y.o. diabetic female admitted 2023 for abd pain X almost 2 weeks, knee pain and swelling. 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 No aspiration done   AF swelling completely resolved knee HIDA neg  Labs Reviewed, Medications Reviewed, and Wound Reviewed.    Review of Systems:  Review of Systems   Constitutional:  Negative for fever.   Gastrointestinal:  Positive for abdominal pain. Negative for diarrhea, nausea and vomiting.        Decreased   Musculoskeletal:  Positive for joint pain and myalgias.        Baseline   All other systems reviewed and are negative.    Hemodynamics:  Temp (24hrs), Av.8 °C (98.2 °F), Min:36.4 °C (97.6 °F), Max:37 °C (98.6 °F)  Temperature: 37 °C (98.6 °F)  Pulse  Av.3  Min: 56  Max: 99   Blood Pressure: 138/87       Physical Exam:  Physical Exam  Vitals and nursing note reviewed.   Constitutional:       General: She is not in acute distress.     Appearance: She is not ill-appearing, toxic-appearing or diaphoretic.   Eyes:      General: No scleral icterus.  Abdominal:      General: There is no distension.      Palpations: Abdomen is soft.      Tenderness: There is no abdominal tenderness. There is no guarding or rebound.   Musculoskeletal:         General: Deformity present.   Skin:     Comments: Ant scars bilateral knees   Neurological:      General: No focal deficit present.      Mental Status: She is alert.      Cranial Nerves: Cranial nerve deficit present.      Comments: Very [place]       Meds:    Current Facility-Administered Medications:     ibuprofen    ketorolac    [COMPLETED] piperacillin-tazobactam **AND** piperacillin-tazobactam    losartan    senna-docusate **AND**  polyethylene glycol/lytes **AND** magnesium hydroxide **AND** bisacodyl    Respiratory Therapy Consult    acetaminophen    hydrALAZINE    ondansetron    ondansetron    promethazine    promethazine    prochlorperazine    insulin regular **AND** POC blood glucose manual result **AND** NOTIFY MD and PharmD **AND** Administer 20 grams of glucose (approximately 8 ounces of fruit juice) every 15 minutes PRN FSBG less than 70 mg/dL **AND** dextrose bolus    Pharmacy Consult Request    celecoxib **FOLLOWED BY** [DISCONTINUED] celecoxib    [Held by provider] oxyCODONE immediate-release **OR** [Held by provider] oxyCODONE immediate-release **OR** [Held by provider] HYDROmorphone    atorvastatin    Labs:  Recent Labs     04/18/23 1538 04/19/23 0336 04/20/23  0207   WBC 4.7* 3.7* 3.9*   RBC 4.12* 3.90* 4.14*   HEMOGLOBIN 11.5* 10.8* 11.6*   HEMATOCRIT 36.2* 33.8* 35.3*   MCV 87.9 86.7 85.3   MCH 27.9 27.7 28.0   RDW 59.2* 58.3* 56.9*   PLATELETCT 87* 85* 94*   MPV 10.1 10.5 10.6   NEUTSPOLYS 59.10 45.20 46.30   LYMPHOCYTES 28.30 39.20 39.00   MONOCYTES 7.90 9.50 6.70   EOSINOPHILS 3.20 4.40 5.60   BASOPHILS 1.10 1.40 2.10*       Recent Labs     04/18/23 1538 04/19/23 0336 04/20/23  0207   SODIUM 139 140 138   POTASSIUM 4.6 4.7 4.3   CHLORIDE 108 112 109   CO2 24 22 19*   GLUCOSE 102* 89 101*   BUN 20 16 17       Recent Labs     04/18/23 1538 04/19/23 0336 04/20/23  0207   ALBUMIN 2.8* 2.7*  --    TBILIRUBIN 0.5 0.5  --    ALKPHOSPHAT 258* 216*  --    TOTPROTEIN 6.6 5.9*  --    ALTSGPT 25 21  --    ASTSGOT 31 27  --    CREATININE 1.02 0.88 1.04         Imaging:  CT-ABDOMEN-PELVIS WITH    Result Date: 4/18/2023 4/18/2023 5:29 PM HISTORY/REASON FOR EXAM:  Abdominal pain, fever, sepsis. TECHNIQUE/EXAM DESCRIPTION:   CT scan of the abdomen and pelvis with contrast. Contrast-enhanced helical scanning was obtained from the diaphragmatic domes through the pubic symphysis following the bolus administration of nonionic contrast  without complication. 100 mL of Omnipaque 350 nonionic contrast was administered without complication. Low dose optimization technique was utilized for this CT exam including automated exposure control and adjustment of the mA and/or kV according to patient size. COMPARISON: 1/7/2023. FINDINGS: Lower chest: Lung bases are clear. Cardiomegaly. Liver: Cirrhotic. Recanalized paraumbilical vein. No hepatic mass lesions are detected. No intrahepatic biliary dilatation. Gallbladder: Gallbladder wall thickening, related to cirrhosis. No calcified gallstones. Common bile duct: Nondilated. Pancreas: Unremarkable. Spleen: Measures 16 cm in the greatest AP dimension. No mass. Adrenals: No mass. Kidneys: No suspicious mass lesions. No hydronephrosis. Stomach, small bowel, colon: No bowel wall thickening or obstruction. Peritoneal cavity: No significant ascites. Lymph nodes: No enlarged nodes by size criteria. Aorta: No aneurysm. Pelvic organs: Hysterectomy. Musculoskeletal structures: No acute fracture or destructive lesion.     1.  No acute abnormality in the abdomen or pelvis. 2.  Cirrhosis and portal hypertension, including splenomegaly and portosystemic collaterals. No hepatic mass lesions detected. 3.  Cardiomegaly.    CT-EXTREMITY, LOWER WITH RIGHT    Result Date: 4/18/2023 4/18/2023 5:29 PM HISTORY/REASON FOR EXAM:  ct of knee/leg requested by Infectious Disease (pt with hx of septic joint/hardware removal/new fevers). TECHNIQUE/EXAM DESCRIPTION AND NUMBER OF VIEWS:  CT scan of the RIGHT lower extremity with contrast, with reconstructions. Thin helical 3 mm sections were obtained from the distal femur through the proximal tibia/fibula. Sagittal and coronal multiplanar reconstructions were generated from the axial images. A total of 100 mL of Omnipaque 350 nonionic contrast was administered  IV without complication. Up to date radiation dose reduction adjustments have been utilized to meet ALARA standards for radiation  dose reduction. COMPARISON: None. FINDINGS: Knee prosthesis in place, normally aligned.  Artifact limits exam. No gross joint effusion. No focal abnormal fluid collection to indicate abscess. No soft tissue gas. Visualized arterial structures enhance normally.     1.  RIGHT knee prosthesis is normally aligned. 2.  No gross fracture or focal bone destruction. 3.  No significant joint effusion or soft tissue gas. 4.  Artifact limits exam.    CT-KNEE WITH PLUS RECONS LEFT    Result Date: 4/19/2023 4/19/2023 3:29 PM HISTORY/REASON FOR EXAM:  left knee pain. TECHNIQUE/ EXAM DESCRIPTION AND NUMBER OF VIEWS:  CT scan of the LEFT knee with contrast, with reconstructions. Thin helical sections were obtained from the distal femur through the proximal tibia/fibula. Sagittal and coronal reconstructions were generated from the axial images. A total of 75 mL of Omnipaque 350 nonionic contrast was administered IV without complication. Up to date radiation dose reduction adjustments have been utilized to meet ALARA standards for radiation dose reduction. COMPARISON:  None. FINDINGS: Left total knee replacement/fusion. There is no evidence of periprosthetic fracture or loosening. Shiley there are tubular areas of methylmethacrylate in the distal femur and mid and distal tibia.     1.  Previous left total knee replacement and fusion. No evidence of periprosthetic fracture or loosening.    DX-CHEST-PORTABLE (1 VIEW)    Result Date: 4/18/2023 4/18/2023 3:43 PM HISTORY/REASON FOR EXAM:  Sepsis. TECHNIQUE/EXAM DESCRIPTION AND NUMBER OF VIEWS: Single portable view of the chest. COMPARISON: Chest x-ray 1/5/2023 FINDINGS: Heart size is moderately enlarged. No pulmonary infiltrates or consolidations are noted. No pleural abnormalities are noted. There are coarse calcific densities in the right paratracheal region.     1.  No acute cardiac or pulmonary abnormalities are identified. 2.  Old granulomatous disease.    US-RUQ    Result Date:  4/18/2023 4/18/2023 4:16 PM HISTORY/REASON FOR EXAM:  Abdominal pain TECHNIQUE/EXAM DESCRIPTION AND NUMBER OF VIEWS:  Real-time sonography of the liver and biliary tree. COMPARISON: Right upper quadrant ultrasound 2/8/2019 FINDINGS: The liver is normal in contour with increased echogenicity. There is no evidence of solid mass lesion. The liver measures 14.13 cm. The gallbladder is normal. There is no evidence of cholelithiasis. The gallbladder wall thickness measures 0.35 cm. There is no pericholecystic fluid. The common duct measures 0.58 cm. The visualized pancreas is unremarkable. The visualized aorta is normal in caliber. Intrahepatic IVC is patent. The portal vein is patent with hepatopetal flow. The MPV measures 1.73 cm. The right kidney measures 10.26 cm. There is no ascites.     1.  Hepatic steatosis and likely cirrhosis. 2.  Dilatation of the main portal vein suggestive of portal hypertension. 3.  Slight thickening of gallbladder wall 3.5 mm which may be secondary to hypoproteinemia or hypoalbuminemia      Micro:  Results       Procedure Component Value Units Date/Time    Urine Culture (NEW) [[id number]] Collected: 04/18/23 1702    Order Status: Completed Specimen: Urine Updated: 04/20/23 0654     Significant Indicator NEG     Source UR     Site -     Culture Result Usual urogenital rubens <10,000 cfu/mL    Narrative:      Indication for culture:->Evaluation for sepsis without a  clear source of infection  Indication for culture:->Evaluation for sepsis without a    Blood Culture [[id number]] Collected: 04/18/23 1651    Order Status: Completed Specimen: Blood from Peripheral Updated: 04/19/23 0923     Significant Indicator NEG     Source BLD     Site PERIPHERAL     Culture Result No Growth  Note: Blood cultures are incubated for 5 days and  are monitored continuously.Positive blood cultures  are called to the RN and reported as soon as  they are identified.      Narrative:      1 of 2 for Blood Culture x 2  "sites order. Per Hospital  Policy: Only change Specimen Src: to \"Line\" if specified by  physician order.  Left Forearm/Arm    Blood Culture [[id number]] Collected: 04/18/23 1538    Order Status: Completed Specimen: Blood from Peripheral Updated: 04/19/23 0923     Significant Indicator NEG     Source BLD     Site PERIPHERAL     Culture Result No Growth  Note: Blood cultures are incubated for 5 days and  are monitored continuously.Positive blood cultures  are called to the RN and reported as soon as  they are identified.      Narrative:      2 of 2 blood culture x2  Sites order. Per Hospital Policy:  Only change Specimen Src: to \"Line\" if specified by physician  order.  Left AC    MRSA By PCR (Amp) [[id number]] Collected: 04/18/23 2350    Order Status: Completed Specimen: Respirate from Nares Updated: 04/19/23 0717     MRSA by PCR Negative    Urinalysis [[id number]] Collected: 04/18/23 1702    Order Status: Completed Specimen: Urine Updated: 04/18/23 1724     Color Yellow     Character Clear     Specific Gravity 1.012     Ph 8.0     Glucose Negative mg/dL      Ketones Negative mg/dL      Protein Negative mg/dL      Bilirubin Negative     Urobilinogen, Urine 0.2     Nitrite Negative     Leukocyte Esterase Negative     Occult Blood Negative     Micro Urine Req see below     Comment: Microscopic examination not performed when specimen is clear  and chemically negative for protein, blood, leukocyte esterase  and nitrite.         Narrative:      Indication for culture:->Evaluation for sepsis without a  clear source of infection            Assessment:  Active Hospital Problems    Diagnosis     *Left knee pain [M25.562]     Thickening of wall of gallbladder [K82.8]     Epigastric pain [R10.13]     Pancytopenia (HCC) [D61.818]     Hyperlipidemia [E78.5]     Paroxysmal atrial fibrillation, hx of [I48.0]     Cirrhosis (HCC) [K74.60]     Controlled type 2 diabetes mellitus with hyperglycemia, without long-term current use of " insulin (HCC) [E11.65]     Hypertension [I10]     History of rheumatic fever [Z86.79]      Chronic infection left knee arthroplasty, GBS-per patient more swollen prior to admission  Not much on exam currently and unclear if failure of suppression  ABd pain  Cirrhosis/portal HTN NO ascites  Leukopenia  DM     PLAN:   FU blood cultures neg  FU HIDA-negative-  Continue Zosyn for now  Recommended synovial fluid analysis not done-effusion/swelling now resolved  Keep BS under 150 to help control current infection  MIDLINE/PICC  no-Anticipate switch to PO at discharge    When ready for discharge  Give cefdinir 300 mg PO BID for suppression of chronic group B strep infection of TKA  FU ID clinic 1-2 weeks with Z Shell  WIll sign off

## 2023-04-21 NOTE — PROGRESS NOTES
Assumed care of patient. AO x4. With no complaints of pain. Routine assessment done. Vital signs within normal limits. Call light within reach and personal belongings within reach. Bed locked and in lowest position. Policies and procedures reinforced patient verbalized understanding.Treaded socks in place. Will continue to monitor.

## 2023-04-21 NOTE — PROGRESS NOTES
Patient returned from IR. No acute distress noted, A&Ox4. Would like to eat, otherwise all needs met. Will remain NPO except meds until HIDA scan results are available and reviewed by MD.   Colchicine Counseling:  Patient counseled regarding adverse effects including but not limited to stomach upset (nausea, vomiting, stomach pain, or diarrhea).  Patient instructed to limit alcohol consumption while taking this medication.  Colchicine may reduce blood counts especially with prolonged use.  The patient understands that monitoring of kidney function and blood counts may be required, especially at baseline. The patient verbalized understanding of the proper use and possible adverse effects of colchicine.  All of the patient's questions and concerns were addressed.

## 2023-04-21 NOTE — PROGRESS NOTES
"  DATE: 4/21/2023    Hospital day 4 abdominal and knee pain.    INTERVAL EVENTS:  HERBERTH completed this morning - negative.    REVIEW OF SYSTEMS:  Mild abdominal pain, hungry, denies N/V.    PHYSICAL EXAMINATION:    Vital Signs: /87   Pulse 65   Temp 37 °C (98.6 °F) (Temporal)   Resp 16   Ht 1.727 m (5' 8\")   Wt 61.1 kg (134 lb 11.2 oz)   SpO2 98%   Physical Exam  Vitals and nursing note reviewed.   Constitutional:       General: She is awake. She is not in acute distress.     Appearance: She is well-developed.   Pulmonary:      Effort: Pulmonary effort is normal. No respiratory distress.   Abdominal:      General: There is no distension.      Palpations: Abdomen is soft.      Tenderness: There is abdominal tenderness. There is no guarding.   Neurological:      Mental Status: She is alert.   Psychiatric:         Behavior: Behavior is cooperative.       LABORATORY VALUES:   Recent Labs     04/18/23  1538 04/19/23  0336 04/20/23  0207   WBC 4.7* 3.7* 3.9*   RBC 4.12* 3.90* 4.14*   HEMOGLOBIN 11.5* 10.8* 11.6*   HEMATOCRIT 36.2* 33.8* 35.3*   MCV 87.9 86.7 85.3   MCH 27.9 27.7 28.0   MCHC 31.8* 32.0* 32.9*   RDW 59.2* 58.3* 56.9*   PLATELETCT 87* 85* 94*   MPV 10.1 10.5 10.6     Recent Labs     04/18/23  1538 04/19/23  0336 04/20/23  0207   SODIUM 139 140 138   POTASSIUM 4.6 4.7 4.3   CHLORIDE 108 112 109   CO2 24 22 19*   GLUCOSE 102* 89 101*   BUN 20 16 17   CREATININE 1.02 0.88 1.04   CALCIUM 8.5 8.4* 8.1*     Recent Labs     04/18/23  1538 04/19/23  0336 04/19/23  1302   ASTSGOT 31 27  --    ALTSGPT 25 21  --    TBILIRUBIN 0.5 0.5  --    ALKPHOSPHAT 258* 216*  --    GLOBULIN 3.8* 3.2  --    INR  --   --  1.36*     Recent Labs     04/19/23  1302   INR 1.36*        IMAGING:   NM-BILIARY (HIDA) SCAN WITH CCK   Final Result      Normal hepatobiliary scan. No evidence of acute cholecystitis.  Normal gallbladder ejection fraction.      CT-KNEE WITH PLUS RECONS LEFT   Final Result      1.  Previous left total " knee replacement and fusion. No evidence of periprosthetic fracture or loosening.      CT-EXTREMITY, LOWER WITH RIGHT   Final Result      1.  RIGHT knee prosthesis is normally aligned.   2.  No gross fracture or focal bone destruction.   3.  No significant joint effusion or soft tissue gas.   4.  Artifact limits exam.      CT-ABDOMEN-PELVIS WITH   Final Result      1.  No acute abnormality in the abdomen or pelvis.   2.  Cirrhosis and portal hypertension, including splenomegaly and portosystemic collaterals. No hepatic mass lesions detected.   3.  Cardiomegaly.      US-RUQ   Final Result      1.  Hepatic steatosis and likely cirrhosis.      2.  Dilatation of the main portal vein suggestive of portal hypertension.      3.  Slight thickening of gallbladder wall 3.5 mm which may be secondary to hypoproteinemia or hypoalbuminemia      DX-CHEST-PORTABLE (1 VIEW)   Final Result      1.  No acute cardiac or pulmonary abnormalities are identified.      2.  Old granulomatous disease.          ASSESSMENT AND PLAN:     Epigastric pain- (present on admission)  Assessment & Plan  1.5 weeks of abdominal pain associated with diarrhea, nausea, and vomiting.  Labs with elevation of alk phos, no leukocytosis.  Imaging with mild thickening of the gallbladder wall; favor cirrhosis as etiology; no pericholecystic fluid or evidence of gallbladder inflammation; no cholelithiasis.  No indications for acute surgical intervention at this time.  Concern for cholecystitis low.  Unable to calculate MELD without INR however the presence of multiple large anterior abdominal collaterals makes abdominal surgery perilous in this patient.  4/19 HIDA pending.  4/20 Pt received narcotics this morning. HIDA rescheduled for 4/21.  4/21 HIDA negative.  No plans for surgery.  [facility].      Surgery will sign off at this time, please call with any further questions or concerns.       ____________________________________     Sonia Kang  DANYELL    DD: 4/21/2023  10:44 AM

## 2023-04-21 NOTE — DISCHARGE PLANNING
Case Management Discharge Planning    Admission Date: 4/18/2023  GMLOS: 3.7  ALOS: 1    6-Clicks ADL Score: 24  6-Clicks Mobility Score: 24      Anticipated Discharge Dispo: Discharge Disposition: Discharged to home/self care (01)  Discharge Address: [facility] [place] [address]  Discharge Contact Phone Number: [phone]    OT: No home needs  PT: Pending    DME Needed: No    Action(s) Taken: OTHER attended Multidisciplinary rounds.      ID following. Patient on Zosyn for now; likely transition to PO abx upon discharge.  HIDA negative and blood cultures negative so far.     Escalations Completed: Met with patient and she is upset as she did not receive breakfast.  Patient was NPO for procedure (this was canceled later). RN aware and she has ordered breakfast. RN to follow up.    Medically Clear: No    Next Steps: follow for PT recommendations    Barriers to Discharge: Medical clearance    Is the patient up for discharge tomorrow: Yes    Patient will call for [facility] or she will need a taxi voucher. Patient has a rollator in her room.    Elsa FORBES RN Case Manager  [phone]

## 2023-04-21 NOTE — THERAPY
"Occupational Therapy   Initial Evaluation     Patient Name: April Alicia  Age:  63 y.o., Sex:  female  Medical Record #: [id number]  Today's Date: 4/21/2023     Precautions: Fall Risk    Assessment  Patient is 63 y.o. female admitted for abdominal and knee pain, pt has a complex medical hx but this admission is being tx for epigastric pain for which is resolving. At this time pt is back to her baseline w/no ADL deficits.    Plan  Occupational Therapy Initial Treatment Plan   Duration: Evaluation only    DC Equipment Recommendations: None  Discharge Recommendations: Anticipate that the patient will have no further occupational therapy needs after discharge from the hospital     Subjective  \"I can go home I want to do outpt therapy\"      Objective     04/21/23 1042   Charge Group   OT Evaluation OT Evaluation Low   Total Time Spent   OT Time Spent Yes   OT Evaluation (Minutes) 14   OT Total Time Spent (Calculated) 14    Services   Is patient using  services for this encounter? No   Initial Contact Note    Initial Contact Note Order Received and Verified, Evaluation Only - Patient Does Not Require Further Acute Occupational Therapy at this Time.  However, May Benefit from Post Acute Therapy for Higher Level Functional Deficits.   Prior Living Situation   Prior Services None   Housing / Facility 1 Story House   Steps Into Home 2   Bathroom Set up Bathtub / Shower Combination;Tub Transfer Bench   Equipment Owned Front-Wheel Walker;4-Wheel Walker;Single Point Cane;Wheelchair   Lives with - Patient's Self Care Capacity Adult Children   Comments Pt reports dtr is able to assist when not at work   Prior Level of ADL Function   Self Feeding Independent   Grooming / Hygiene Independent   Bathing Independent   Dressing Independent   Toileting Independent   Prior Level of IADL Function   Medication Management Independent   Laundry Requires Assist   Kitchen Mobility Requires Assist   Finances " Requires Assist   Home Management Requires Assist   Shopping Requires Assist   Prior Level Of Mobility Independent With Device in Home;Independent With Device in Community   Precautions   Precautions Fall Risk   Pain 0 - 10 Group   Therapist Pain Assessment 0;Nurse Notified   Cognition    Cognition / Consciousness WDL   Passive ROM Upper Body   Passive ROM Upper Body WDL   Active ROM Upper Body   Active ROM Upper Body  WDL   Strength Upper Body   Upper Body Strength  WDL   Coordination Upper Body   Coordination WDL   Balance Assessment   Sitting Balance (Static) Fair   Sitting Balance (Dynamic) Fair   Standing Balance (Static) Fair   Standing Balance (Dynamic) Fair   Weight Shift Sitting Fair   Weight Shift Standing Fair   Comments 4ww   Bed Mobility    Supine to Sit Modified Independent   Comments up w/PT   ADL Assessment   Grooming Modified Independent;Standing   Upper Body Dressing Modified Independent   Lower Body Dressing Modified Independent   Toileting Modified Independent   Comments appears to be at baseline   How much help from another person does the patient currently need...   6 Clicks Daily Activity Score 24   Functional Mobility   Sit to Stand Supervised   Bed, Chair, Wheelchair Transfer Supervised   Toilet Transfers Supervised   Mobility walking in room 4ww   Activity Tolerance   Comments no overt c/o pain or fatigue   Patient / Family Goals   Patient / Family Goal #1 to go home   Short Term Goals   Short Term Goal # 1 pt will have no further acute OT needs   Education Group   Role of Occupational Therapist Patient Response Patient;Acceptance;Explanation;Demonstration   Occupational Therapy Initial Treatment Plan    Duration Evaluation only   Problem List   Problem List None   Anticipated Discharge Equipment and Recommendations   DC Equipment Recommendations None   Discharge Recommendations Anticipate that the patient will have no further occupational therapy needs after discharge from the hospital    Interdisciplinary Plan of Care Collaboration   IDT Collaboration with  Nursing   Patient Position at End of Therapy In Bed;Call Light within Reach;Tray Table within Reach;Phone within Reach   Collaboration Comments RN aware of OT eval and pts efforts   Session Information   Date / Session Number  4/21 eval only

## 2023-04-21 NOTE — THERAPY
Physical Therapy   Initial Evaluation     Patient Name: April Alicia  Age:  63 y.o., Sex:  female  Medical Record #: [id number]  Today's Date: 4/21/2023     Precautions  Precautions: Fall Risk    Assessment  Patient is 63 y.o. female that presented to acute with complaints of abdominal pain. PMHx significant for remote substance and ETOH abuse, cirrhosis, Afib, DM2, HTN. She presented to PT at or near baseline functional mobility and mobilized as detailed below. She reported no concerns regarding returning home following medical DC with support from daughter. Patient will not be actively followed for physical therapy services at this time, however may be seen if requested by physician for 1 more visit within 30 days to address any discharge or equipment needs.     Plan    DC Equipment Recommendations: None  Discharge Recommendations: Anticipate that the patient will have no further physical therapy needs after discharge from the hospital     Objective       04/21/23 1101   Charge Group   PT Evaluation PT Evaluation Low   Total Time Spent   PT Total Time Yes   PT Evaluation Time Spent (Mins) 20   PT Total Time Spent (Calculated) 20   Initial Contact Note    Initial Contact Note Order Received and Verified, Evaluation Only - Patient Does Not Require Further Acute Physical Therapy at this Time.  However, May Benefit from Post Acute Therapy for Higher Level Functional Deficits.   Precautions   Precautions Fall Risk   Vitals   O2 (LPM) 0   O2 Delivery Device None - Room Air   Pain 0 - 10 Group   Therapist Pain Assessment   (no pain complaint during session)   Prior Living Situation   Prior Services None   Housing / Facility 1 Story House   Steps Into Home 2   Steps In Home 0   Equipment Owned 4-Wheel Walker;Single Point Cane;Bed Side Commode;Wheelchair   Lives with - Patient's Self Care Capacity Adult Children   Comments Patient reported daughter can work from home and is able to assist   Prior Level of  Functional Mobility   Bed Mobility Independent   Transfer Status Independent   Ambulation Independent   Ambulation Distance community   Assistive Devices Used 4-Wheel Walker   Stairs Independent   Comments Patient reported she walks daily for exercise   History of Falls   History of Falls Yes   Cognition    Cognition / Consciousness X   Comments appears to be at/near baseline. pleasant, cooperative, but decreased insight   Active ROM Lower Body    Comments functional for mobility   Strength Lower Body   Comments as above   Coordination Lower Body    Coordination Lower Body  WDL   Balance Assessment   Sitting Balance (Static) Fair +   Sitting Balance (Dynamic) Fair   Standing Balance (Static) Fair   Standing Balance (Dynamic) Fair   Weight Shift Sitting Fair   Weight Shift Standing Fair   Comments with 4WW, no overt LOB   Bed Mobility    Supine to Sit Supervised   Sit to Supine Supervised   Scooting Supervised   Gait Analysis   Gait Level Of Assist Supervised   Assistive Device 4 Wheel Walker   Distance (Feet) 350   # of Times Distance was Traveled 1   # of Stairs Climbed 0   Weight Bearing Status no restrictions   Vision Deficits Impacting Mobility NT   Comments stairs not trialed due to IV pole but patient appears capable and reported no concerns   Functional Mobility   Sit to Stand Supervised   Bed, Chair, Wheelchair Transfer Supervised   Transfer Method Stand Step   How much difficulty does the patient currently have...   Turning over in bed (including adjusting bedclothes, sheets and blankets)? 4   Sitting down on and standing up from a chair with arms (e.g., wheelchair, bedside commode, etc.) 4   Moving from lying on back to sitting on the side of the bed? 4   How much help from another person does the patient currently need...   Moving to and from a bed to a chair (including a wheelchair)? 4   Need to walk in a hospital room? 4   Climbing 3-5 steps with a railing? 4   6 clicks Mobility Score 24   Education  Group   Education Provided Role of Physical Therapist   Role of Physical Therapist Patient Response Patient;Acceptance;Explanation;Verbal Demonstration   Anticipated Discharge Equipment and Recommendations   DC Equipment Recommendations None   Discharge Recommendations Anticipate that the patient will have no further physical therapy needs after discharge from the hospital   Interdisciplinary Plan of Care Collaboration   IDT Collaboration with  Nursing;Occupational Therapist   Patient Position at End of Therapy In Bed;Call Light within Reach;Tray Table within Reach;Phone within Reach   Collaboration Comments RN aware of visit, response   Session Information   Date / Session Number  4/21 - eval only

## 2023-04-22 LAB
ALBUMIN SERPL BCP-MCNC: 2.5 G/DL (ref 3.2–4.9)
ALBUMIN/GLOB SERPL: 0.7 G/DL
ALP SERPL-CCNC: 188 U/L (ref 30–99)
ALT SERPL-CCNC: 18 U/L (ref 2–50)
ANION GAP SERPL CALC-SCNC: 8 MMOL/L (ref 7–16)
AST SERPL-CCNC: 25 U/L (ref 12–45)
BASOPHILS # BLD AUTO: 2.4 % (ref 0–1.8)
BASOPHILS # BLD: 0.09 K/UL (ref 0–0.12)
BILIRUB SERPL-MCNC: 0.7 MG/DL (ref 0.1–1.5)
BUN SERPL-MCNC: 18 MG/DL (ref 8–22)
CALCIUM ALBUM COR SERPL-MCNC: 9.4 MG/DL (ref 8.5–10.5)
CALCIUM SERPL-MCNC: 8.2 MG/DL (ref 8.5–10.5)
CHLORIDE SERPL-SCNC: 112 MMOL/L (ref 96–112)
CO2 SERPL-SCNC: 18 MMOL/L (ref 20–33)
CREAT SERPL-MCNC: 0.76 MG/DL (ref 0.5–1.4)
EOSINOPHIL # BLD AUTO: 0.25 K/UL (ref 0–0.51)
EOSINOPHIL NFR BLD: 6.6 % (ref 0–6.9)
ERYTHROCYTE [DISTWIDTH] IN BLOOD BY AUTOMATED COUNT: 55.5 FL (ref 35.9–50)
GFR SERPLBLD CREATININE-BSD FMLA CKD-EPI: 88 ML/MIN/1.73 M 2
GLOBULIN SER CALC-MCNC: 3.7 G/DL (ref 1.9–3.5)
GLUCOSE BLD STRIP.AUTO-MCNC: 105 MG/DL (ref 65–99)
GLUCOSE BLD STRIP.AUTO-MCNC: 155 MG/DL (ref 65–99)
GLUCOSE BLD STRIP.AUTO-MCNC: 199 MG/DL (ref 65–99)
GLUCOSE BLD STRIP.AUTO-MCNC: 81 MG/DL (ref 65–99)
GLUCOSE SERPL-MCNC: 88 MG/DL (ref 65–99)
HCT VFR BLD AUTO: 36.1 % (ref 37–47)
HGB BLD-MCNC: 11.7 G/DL (ref 12–16)
IMM GRANULOCYTES # BLD AUTO: 0.01 K/UL (ref 0–0.11)
IMM GRANULOCYTES NFR BLD AUTO: 0.3 % (ref 0–0.9)
LYMPHOCYTES # BLD AUTO: 1.59 K/UL (ref 1–4.8)
LYMPHOCYTES NFR BLD: 42.1 % (ref 22–41)
MCH RBC QN AUTO: 27.9 PG (ref 27–33)
MCHC RBC AUTO-ENTMCNC: 32.4 G/DL (ref 33.6–35)
MCV RBC AUTO: 86.2 FL (ref 81.4–97.8)
MONOCYTES # BLD AUTO: 0.25 K/UL (ref 0–0.85)
MONOCYTES NFR BLD AUTO: 6.6 % (ref 0–13.4)
NEUTROPHILS # BLD AUTO: 1.59 K/UL (ref 2–7.15)
NEUTROPHILS NFR BLD: 42 % (ref 44–72)
NRBC # BLD AUTO: 0 K/UL
NRBC BLD-RTO: 0 /100 WBC
PLATELET # BLD AUTO: 75 K/UL (ref 164–446)
PMV BLD AUTO: 10 FL (ref 9–12.9)
POTASSIUM SERPL-SCNC: 4.5 MMOL/L (ref 3.6–5.5)
PROT SERPL-MCNC: 6.2 G/DL (ref 6–8.2)
RBC # BLD AUTO: 4.19 M/UL (ref 4.2–5.4)
SODIUM SERPL-SCNC: 138 MMOL/L (ref 135–145)
WBC # BLD AUTO: 3.8 K/UL (ref 4.8–10.8)

## 2023-04-22 PROCEDURE — 36415 COLL VENOUS BLD VENIPUNCTURE: CPT

## 2023-04-22 PROCEDURE — A9270 NON-COVERED ITEM OR SERVICE: HCPCS | Performed by: INTERNAL MEDICINE

## 2023-04-22 PROCEDURE — 700111 HCHG RX REV CODE 636 W/ 250 OVERRIDE (IP): Performed by: STUDENT IN AN ORGANIZED HEALTH CARE EDUCATION/TRAINING PROGRAM

## 2023-04-22 PROCEDURE — 82962 GLUCOSE BLOOD TEST: CPT | Mod: 91

## 2023-04-22 PROCEDURE — 80053 COMPREHEN METABOLIC PANEL: CPT

## 2023-04-22 PROCEDURE — 770006 HCHG ROOM/CARE - MED/SURG/GYN SEMI*

## 2023-04-22 PROCEDURE — 85025 COMPLETE CBC W/AUTO DIFF WBC: CPT

## 2023-04-22 PROCEDURE — A9270 NON-COVERED ITEM OR SERVICE: HCPCS | Performed by: NURSE PRACTITIONER

## 2023-04-22 PROCEDURE — 700102 HCHG RX REV CODE 250 W/ 637 OVERRIDE(OP): Performed by: INTERNAL MEDICINE

## 2023-04-22 PROCEDURE — 700105 HCHG RX REV CODE 258: Performed by: STUDENT IN AN ORGANIZED HEALTH CARE EDUCATION/TRAINING PROGRAM

## 2023-04-22 PROCEDURE — A9270 NON-COVERED ITEM OR SERVICE: HCPCS | Performed by: STUDENT IN AN ORGANIZED HEALTH CARE EDUCATION/TRAINING PROGRAM

## 2023-04-22 PROCEDURE — 700102 HCHG RX REV CODE 250 W/ 637 OVERRIDE(OP): Performed by: STUDENT IN AN ORGANIZED HEALTH CARE EDUCATION/TRAINING PROGRAM

## 2023-04-22 PROCEDURE — 700102 HCHG RX REV CODE 250 W/ 637 OVERRIDE(OP): Performed by: NURSE PRACTITIONER

## 2023-04-22 PROCEDURE — 99231 SBSQ HOSP IP/OBS SF/LOW 25: CPT | Performed by: INTERNAL MEDICINE

## 2023-04-22 RX ORDER — ACETAMINOPHEN 325 MG/1
650 TABLET ORAL EVERY 6 HOURS PRN
Qty: 100 TABLET | Refills: 0 | Status: SHIPPED | OUTPATIENT
Start: 2023-04-22 | End: 2023-10-05

## 2023-04-22 RX ORDER — IBUPROFEN 600 MG/1
600 TABLET ORAL EVERY 6 HOURS PRN
Qty: 30 TABLET | Refills: 0 | Status: SHIPPED | OUTPATIENT
Start: 2023-04-22 | End: 2023-10-05

## 2023-04-22 RX ORDER — CEFDINIR 300 MG/1
300 CAPSULE ORAL 2 TIMES DAILY
Qty: 60 CAPSULE | Refills: 1 | Status: ACTIVE | OUTPATIENT
Start: 2023-04-22 | End: 2023-05-16

## 2023-04-22 RX ORDER — AMOXICILLIN 250 MG
2 CAPSULE ORAL 2 TIMES DAILY
Qty: 30 TABLET | Refills: 0 | Status: SHIPPED | OUTPATIENT
Start: 2023-04-22 | End: 2024-01-02

## 2023-04-22 RX ADMIN — ATORVASTATIN CALCIUM 20 MG: 20 TABLET, FILM COATED ORAL at 20:23

## 2023-04-22 RX ADMIN — PIPERACILLIN AND TAZOBACTAM 3.38 G: 3; .375 INJECTION, POWDER, LYOPHILIZED, FOR SOLUTION INTRAVENOUS; PARENTERAL at 12:25

## 2023-04-22 RX ADMIN — PIPERACILLIN AND TAZOBACTAM 3.38 G: 3; .375 INJECTION, POWDER, LYOPHILIZED, FOR SOLUTION INTRAVENOUS; PARENTERAL at 22:58

## 2023-04-22 RX ADMIN — OXYCODONE 5 MG: 5 TABLET ORAL at 08:34

## 2023-04-22 RX ADMIN — OXYCODONE 5 MG: 5 TABLET ORAL at 22:07

## 2023-04-22 RX ADMIN — CELECOXIB 200 MG: 200 CAPSULE ORAL at 05:05

## 2023-04-22 RX ADMIN — FAMOTIDINE 20 MG: 20 TABLET, FILM COATED ORAL at 05:14

## 2023-04-22 RX ADMIN — OXYCODONE 5 MG: 5 TABLET ORAL at 03:41

## 2023-04-22 RX ADMIN — CELECOXIB 200 MG: 200 CAPSULE ORAL at 17:33

## 2023-04-22 RX ADMIN — DOCUSATE SODIUM 50 MG AND SENNOSIDES 8.6 MG 2 TABLET: 8.6; 5 TABLET, FILM COATED ORAL at 17:33

## 2023-04-22 RX ADMIN — INSULIN HUMAN 1 UNITS: 100 INJECTION, SOLUTION PARENTERAL at 20:20

## 2023-04-22 RX ADMIN — DOCUSATE SODIUM 50 MG AND SENNOSIDES 8.6 MG 2 TABLET: 8.6; 5 TABLET, FILM COATED ORAL at 05:05

## 2023-04-22 RX ADMIN — OXYCODONE 5 MG: 5 TABLET ORAL at 17:33

## 2023-04-22 RX ADMIN — INSULIN HUMAN 1 UNITS: 100 INJECTION, SOLUTION PARENTERAL at 12:26

## 2023-04-22 RX ADMIN — ONDANSETRON 4 MG: 2 INJECTION INTRAMUSCULAR; INTRAVENOUS at 22:18

## 2023-04-22 RX ADMIN — LOSARTAN POTASSIUM 100 MG: 50 TABLET, FILM COATED ORAL at 05:15

## 2023-04-22 RX ADMIN — PIPERACILLIN AND TAZOBACTAM 3.38 G: 3; .375 INJECTION, POWDER, LYOPHILIZED, FOR SOLUTION INTRAVENOUS; PARENTERAL at 05:05

## 2023-04-22 ASSESSMENT — PAIN SCALES - WONG BAKER
WONGBAKER_NUMERICALRESPONSE: HURTS JUST A LITTLE BIT

## 2023-04-22 ASSESSMENT — ENCOUNTER SYMPTOMS
CONSTIPATION: 0
DIARRHEA: 0
WEAKNESS: 0
FEVER: 0
SHORTNESS OF BREATH: 0
HEADACHES: 0
VOMITING: 0
BLURRED VISION: 0
HEARTBURN: 0
COUGH: 0
ABDOMINAL PAIN: 1
MYALGIAS: 1
CHILLS: 0
DOUBLE VISION: 0
INSOMNIA: 0
PALPITATIONS: 0

## 2023-04-22 ASSESSMENT — PAIN DESCRIPTION - PAIN TYPE
TYPE: CHRONIC PAIN

## 2023-04-22 ASSESSMENT — FIBROSIS 4 INDEX: FIB4 SCORE: 3.95

## 2023-04-22 NOTE — CARE PLAN
Problem: Pain - Standard  Goal: Alleviation of pain or a reduction in pain to the patient’s comfort goal  Outcome: Progressing     Problem: Knowledge Deficit - Standard  Goal: Patient and family/care givers will demonstrate understanding of plan of care, disease process/condition, diagnostic tests and medications  Outcome: Progressing       The patient is Stable - Low risk of patient condition declining or worsening    Shift Goals  Clinical Goals: iv abx, pain control  Patient Goals: comfort, discharge  Family Goals: no family present    Progress made toward(s) clinical / shift goals:  April is A&O x4. VSS on RA. OOB independently. Tolerating cardiac diet. Last BM 4/21. Voiding adequately. Continent of bowel and bladder. BG ACHS. C/o L knee pain medicated with prn oxy. IV abx infused per MAR. Discharge tomorrow. Safety maintained.

## 2023-04-22 NOTE — PROGRESS NOTES
[facility] Medicine Daily Progress Note    Date of Service  4/22/2023    Chief Complaint  April Alicia is a 63 y.o. female admitted 4/18/2023 with left knee pain.    Hospital Course  April Alicia is a 63 y.o. female with PMH of alcohol, cocaine abuse (currently does not use), cirrhosis, portal hypertension, atrial fibrillation on apixaban, type 2 diabetes mellitus, hypertension, and a complex left knee prosthetic joint infection who presented 4/18/2023 that presented to the ED for suspicion for failing outpatient antibiotic treatment.     Patient was admitted on July 2022 with a group B strep bacteremia, left knee prosthetic joint infection, left shoulder septic arthritis, and subdeltoid abscess.  Plan was for discharge to skilled nursing facility and continue IV ceftriaxone for 6 weeks until 8/30/2022.  Due to some unknown reason, patient only received 4 weeks of antibiotics.  Patient was readmitted on 12/26/2022 due to recurrent left knee pain and swelling.  Was taken back to the OR on 12/28 underwent explantation and placement of articulating spacer and wound VAC.  Multiple OR cultures grew group B Strep.  Underwent synovial fluid removal from the left shoulder on 1/2/2022 which showed WBC 1051, 40% polys, 40% lymphs, not consistent with infection.  Plan was to discharge with 6 weeks of IV antibiotics (ceftriaxone 2 g daily till 2/7/2023).  Per ID documentation, orthopedic MD Garcia recommended oral antibiotics for 1 year prior to reimplantation (status post 2 I's and D's in the past with 2 antibiotic spacers which failed).  Patient was started on Augmentin twice daily on 2/15.  Patient reporting compliance.  Patient had ID appointment on 4/18 and reported general malaise, fatigue, drowsiness, decreased appetite, and left knee swelling for approximately 2 weeks.  Due to concern of failed outpatient treatment, ID recommended presenting to ED ASAP.     In the ED, vital signs within normal  limits.  CBC remarkable for WBC 4.7, hemoglobin 11.5, platelet count 87.  ESR 23.  CMP remarkable for Alk phos 258, albumin 2.8, globulin 3.8.  Lipase 27.  Lactic acid 0.8.  UA unremarkable.  CRP 0.3.  CT A/P demonstrated cirrhosis and portal hypertension, including splenomegaly and portal systemic collaterals; cardiomegaly.  CT left lower extremity pending.      General surgeon, Dr. Oviedo, consulted due to abdominal pain with CT findings.  No indication for acute surgical intervention.  HIDA scan pending.  Orthopedic surgeon, Dr. Luz, consulted who recommended IV antibiotics.  Placed on IV Zosyn.  ID consulted.  Awaiting recommendations.    Interval Problem Update  4/19 Orthopedic surgery consulted.  -ID consulted.  -General surgery consulted.  -Pending HIDA scan.    4/20 minimal abdominal pain  Ongoing knee pain  Avoid narcotics due to need for HIDA scan  Pending PT OT evaluation, may need placement    4/21 hida neg, min abd soreness  Tolerating diet  Improved knee pain    4/22 patient reports increasing abdominal pain after oral intake  Advised to decrease oral intake with recurrent pain  Patient aware  Encourage activity, cleared by PT OT    I have discussed this patient's plan of care and discharge plan at IDT rounds today with Case Management, Nursing, Nursing leadership, and other members of the IDT team.    Consultants/Specialty  general surgery, infectious disease, and orthopedics    Code Status  Full Code    Disposition  Patient is not medically cleared for discharge.   Anticipate discharge to to home with organized home healthcare and close outpatient follow-up.  I have placed the appropriate orders for post-discharge needs.    Review of Systems  Review of Systems   Constitutional:  Negative for chills and fever.   HENT:  Negative for congestion.    Eyes:  Negative for blurred vision and double vision.   Respiratory:  Negative for cough and shortness of breath.    Cardiovascular:  Negative for  palpitations and leg swelling.   Gastrointestinal:  Positive for abdominal pain. Negative for constipation, diarrhea, heartburn and vomiting.   Genitourinary:  Negative for frequency.   Musculoskeletal:  Positive for joint pain and myalgias.   Skin:  Negative for itching and rash.   Neurological:  Negative for weakness and headaches.   Psychiatric/Behavioral:  The patient does not have insomnia.       Physical Exam  Temp:  [36.3 °C (97.4 °F)-36.8 °C (98.2 °F)] 36.4 °C (97.5 °F)  Pulse:  [62-76] 62  Resp:  [16-19] 16  BP: (100-149)/(54-85) 123/75  SpO2:  [92 %-99 %] 95 %    Physical Exam  Vitals and nursing note reviewed.   Constitutional:       General: She is not in acute distress.     Appearance: Normal appearance. She is ill-appearing.   HENT:      Head: Normocephalic and atraumatic.      Left Ear: Ear canal normal.      Nose: Nose normal.      Mouth/Throat:      Mouth: Mucous membranes are moist.      Pharynx: Oropharynx is clear.   Eyes:      General: No scleral icterus.     Extraocular Movements: Extraocular movements intact.      Conjunctiva/sclera: Conjunctivae normal.      Pupils: Pupils are equal, round, and reactive to light.   Cardiovascular:      Rate and Rhythm: Normal rate and regular rhythm.      Pulses: Normal pulses.      Comments: Holosystolic murmur.  Pulmonary:      Effort: Pulmonary effort is normal. No respiratory distress.      Breath sounds: Normal breath sounds.   Abdominal:      General: Bowel sounds are normal. There is no distension.      Palpations: Abdomen is soft. There is no mass.      Tenderness: There is generalized abdominal tenderness. There is no guarding or rebound.   Musculoskeletal:         General: No swelling or tenderness. Normal range of motion.      Cervical back: Normal range of motion.      Right lower leg: No edema.      Left lower leg: Edema present.      Comments: Edema and tenderness to left knee.     Skin:     General: Skin is warm and dry.      Coloration: Skin  is not jaundiced or pale.      Findings: No bruising.   Neurological:      General: No focal deficit present.      Mental Status: She is alert and oriented to person, place, and time. Mental status is at baseline.      Cranial Nerves: No cranial nerve deficit.      Sensory: No sensory deficit.      Motor: Weakness present.      Coordination: Coordination normal.   Psychiatric:         Mood and Affect: Mood normal.         Behavior: Behavior normal.     Fluids    Intake/Output Summary (Last 24 hours) at 4/22/2023 1315  Last data filed at 4/22/2023 0834  Gross per 24 hour   Intake 527 ml   Output --   Net 527 ml       Laboratory  Recent Labs     04/20/23  0207 04/22/23  0642   WBC 3.9* 3.8*   RBC 4.14* 4.19*   HEMOGLOBIN 11.6* 11.7*   HEMATOCRIT 35.3* 36.1*   MCV 85.3 86.2   MCH 28.0 27.9   MCHC 32.9* 32.4*   RDW 56.9* 55.5*   PLATELETCT 94* 75*   MPV 10.6 10.0     Recent Labs     04/20/23  0207 04/22/23  0642   SODIUM 138 138   POTASSIUM 4.3 4.5   CHLORIDE 109 112   CO2 19* 18*   GLUCOSE 101* 88   BUN 17 18   CREATININE 1.04 0.76   CALCIUM 8.1* 8.2*                     Imaging  NM-BILIARY (HIDA) SCAN WITH CCK   Final Result      Normal hepatobiliary scan. No evidence of acute cholecystitis.  Normal gallbladder ejection fraction.      CT-KNEE WITH PLUS RECONS LEFT   Final Result      1.  Previous left total knee replacement and fusion. No evidence of periprosthetic fracture or loosening.      CT-EXTREMITY, LOWER WITH RIGHT   Final Result      1.  RIGHT knee prosthesis is normally aligned.   2.  No gross fracture or focal bone destruction.   3.  No significant joint effusion or soft tissue gas.   4.  Artifact limits exam.      CT-ABDOMEN-PELVIS WITH   Final Result      1.  No acute abnormality in the abdomen or pelvis.   2.  Cirrhosis and portal hypertension, including splenomegaly and portosystemic collaterals. No hepatic mass lesions detected.   3.  Cardiomegaly.      US-RUQ   Final Result      1.  Hepatic steatosis  and likely cirrhosis.      2.  Dilatation of the main portal vein suggestive of portal hypertension.      3.  Slight thickening of gallbladder wall 3.5 mm which may be secondary to hypoproteinemia or hypoalbuminemia      DX-CHEST-PORTABLE (1 VIEW)   Final Result      1.  No acute cardiac or pulmonary abnormalities are identified.      2.  Old granulomatous disease.         Assessment/Plan  * Left knee pain- (present on admission)  Assessment & Plan  -Status post left knee prosthetic joint infection 7/2022 + GBS bacteremia.     -On IV ceftriaxone for 4 weeks instead of 6.    -Readmitted 12/2022 due to recurrence.     -I&D 12/28th with for placement of articulating spacer, wound VAC; cultures positive for GBS.     -Placed on IV ceftriaxone until 2/2023.    -Ortho recommended antibiotics for 1 year before reimplantation.     -Was on Augmentin twice daily since 2/15.    -Continue IV Zosyn.  -CT left lower extremity pending.  -Orthopedic surgery consulted.  -ID consulted.    4/20 continue IV antibiotics, ID following  PT OT evaluation, may need skilled placement    Thickening of wall of gallbladder  Assessment & Plan  -Suspect secondary to cirrhosis.  -CT A/P showed gallbladder wall thickening, no calcified gallstones.  -General surgery consulted.   -No indication for acute surgical intervention.  -HIDA scan pending.    4/20 HIDA scan pending, surgery to follow-up    Pancytopenia (HCC)  Assessment & Plan  -Suspect secondary to cirrhosis.    -History of drug-induced (Rocephin in 2022).  -Transfusion if hemoglobin less than 7.  -Transfusion if platelets less than 20 or evidence of active bleeding.    Hyperlipidemia- (present on admission)  Assessment & Plan  -Lipid panel remarkable for total cholesterol 82.  -Continue outpatient atorvastatin.    Paroxysmal atrial fibrillation, hx of- (present on admission)  Assessment & Plan  -[id number] Score: 3.    -Was on apixaban which was discontinue due to bleeding.   -Last  cardiology appt on 3/8/23.    -30-day Biotel monitor showed frequent PACs, greater than 7%, no atrial fibrillation was noted.    Cirrhosis (HCC)- (present on admission)  Assessment & Plan  -Suspect secondary to past chronic alcohol drinking.    -CT A/P demonstrated cirrhosis, portal hypertension, splenomegaly, and portal systemic collaterals.   -Alk phos elevated.  -AST/ALT and tbili WNL.  -Pending INR, unable to calculate MELD.    Hypertension- (present on admission)  Assessment & Plan  -Controlled.    -Restarted outpatient Losartan.  -Continue as needed hydralazine.    Controlled type 2 diabetes mellitus with hyperglycemia, without long-term current use of insulin (HCC)- (present on admission)  Assessment & Plan  -Hemoglobin A1c 5.4% on 1/2023.    -Continue sliding scale insulin with hypoglycemic protocol.    History of rheumatic fever- (present on admission)  Assessment & Plan  -History of rheumatic fever at age 26.  -ECHO on 7/2022 demonstrated EF 60%, with moderate concentric left ventricular hypertrophy, mild mitral regurgitation.    VTE prophylaxis: SCDs/TEDs    I have performed a physical exam and reviewed and updated ROS and Plan today (4/22/2023). In review of yesterday's note (4/21/2023), there are no changes except as documented above.

## 2023-04-22 NOTE — PROGRESS NOTES
[facility] Daily Progress Note    Date of Service  4/21/2023    Chief Complaint  April Alicia is a 63 y.o. female admitted 4/18/2023 with left knee pain.    Hospital Course  April Alicia is a 63 y.o. female with PMH of alcohol, cocaine abuse (currently does not use), cirrhosis, portal hypertension, atrial fibrillation on apixaban, type 2 diabetes mellitus, hypertension, and a complex left knee prosthetic joint infection who presented 4/18/2023 that presented to the ED for suspicion for failing outpatient antibiotic treatment.     Patient was admitted on July 2022 with a group B strep bacteremia, left knee prosthetic joint infection, left shoulder septic arthritis, and subdeltoid abscess.  Plan was for discharge to skilled nursing facility and continue IV ceftriaxone for 6 weeks until 8/30/2022.  Due to some unknown reason, patient only received 4 weeks of antibiotics.  Patient was readmitted on 12/26/2022 due to recurrent left knee pain and swelling.  Was taken back to the OR on 12/28 underwent explantation and placement of articulating spacer and wound VAC.  Multiple OR cultures grew group B Strep.  Underwent synovial fluid removal from the left shoulder on 1/2/2022 which showed WBC 1051, 40% polys, 40% lymphs, not consistent with infection.  Plan was to discharge with 6 weeks of IV antibiotics (ceftriaxone 2 g daily till 2/7/2023).  Per ID documentation, orthopedic MD Garcia recommended oral antibiotics for 1 year prior to reimplantation (status post 2 I's and D's in the past with 2 antibiotic spacers which failed).  Patient was started on Augmentin twice daily on 2/15.  Patient reporting compliance.  Patient had ID appointment on 4/18 and reported general malaise, fatigue, drowsiness, decreased appetite, and left knee swelling for approximately 2 weeks.  Due to concern of failed outpatient treatment, ID recommended presenting to ED ASAP.     In the ED, vital signs within normal  limits.  CBC remarkable for WBC 4.7, hemoglobin 11.5, platelet count 87.  ESR 23.  CMP remarkable for Alk phos 258, albumin 2.8, globulin 3.8.  Lipase 27.  Lactic acid 0.8.  UA unremarkable.  CRP 0.3.  CT A/P demonstrated cirrhosis and portal hypertension, including splenomegaly and portal systemic collaterals; cardiomegaly.  CT left lower extremity pending.      General surgeon, Dr. Oviedo, consulted due to abdominal pain with CT findings.  No indication for acute surgical intervention.  HIDA scan pending.  Orthopedic surgeon, Dr. Luz, consulted who recommended IV antibiotics.  Placed on IV Zosyn.  ID consulted.  Awaiting recommendations.    Interval Problem Update  4/19 Orthopedic surgery consulted.  -ID consulted.  -General surgery consulted.  -Pending HIDA scan.    4/20 minimal abdominal pain  Ongoing knee pain  Avoid narcotics due to need for HIDA scan  Pending PT OT evaluation, may need placement    4/21 hida neg, min abd soreness  Tolerating diet  Improved knee pain    I have discussed this patient's plan of care and discharge plan at IDT rounds today with Case Management, Nursing, Nursing leadership, and other members of the IDT team.    Consultants/Specialty  general surgery, infectious disease, and orthopedics    Code Status  Full Code    Disposition  Patient is not medically cleared for discharge.   Anticipate discharge to to home with organized home healthcare and close outpatient follow-up.  I have placed the appropriate orders for post-discharge needs.    Review of Systems  Review of Systems   Constitutional:  Negative for chills and fever.   HENT:  Negative for congestion.    Respiratory:  Negative for cough and shortness of breath.    Cardiovascular:  Negative for palpitations and leg swelling.   Gastrointestinal:  Positive for abdominal pain and heartburn. Negative for diarrhea and vomiting.   Genitourinary:  Negative for frequency.   Musculoskeletal:  Positive for joint pain and myalgias.   Skin:   Negative for rash.   Neurological:  Negative for dizziness, weakness and headaches.   Psychiatric/Behavioral:  Negative for depression. The patient does not have insomnia.       Physical Exam  Temp:  [36.8 °C (98.2 °F)-37 °C (98.6 °F)] 36.8 °C (98.2 °F)  Pulse:  [56-65] 63  Resp:  [16-18] 18  BP: (118-138)/(70-87) 120/70  SpO2:  [96 %-99 %] 99 %    Physical Exam  Vitals and nursing note reviewed.   Constitutional:       General: She is not in acute distress.     Appearance: Normal appearance. She is ill-appearing. She is not toxic-appearing or diaphoretic.   HENT:      Head: Normocephalic and atraumatic.      Right Ear: External ear normal.      Left Ear: External ear normal.      Nose: Nose normal.      Mouth/Throat:      Mouth: Mucous membranes are moist.      Pharynx: Oropharynx is clear.   Eyes:      General: No scleral icterus.     Extraocular Movements: Extraocular movements intact.      Conjunctiva/sclera: Conjunctivae normal.      Pupils: Pupils are equal, round, and reactive to light.   Cardiovascular:      Rate and Rhythm: Normal rate and regular rhythm.      Pulses: Normal pulses.      Comments: Holosystolic murmur.  Pulmonary:      Effort: Pulmonary effort is normal. No respiratory distress.      Breath sounds: Normal breath sounds.   Abdominal:      General: Bowel sounds are normal. There is no distension.      Palpations: Abdomen is soft. There is no mass.      Tenderness: There is generalized abdominal tenderness. There is no guarding or rebound.   Musculoskeletal:         General: No swelling or tenderness. Normal range of motion.      Cervical back: Normal range of motion.      Right lower leg: No edema.      Left lower leg: Edema present.      Comments: Edema and tenderness to left knee.     Skin:     General: Skin is warm and dry.      Coloration: Skin is not jaundiced or pale.      Findings: No bruising.   Neurological:      General: No focal deficit present.      Mental Status: She is alert and  oriented to person, place, and time. Mental status is at baseline.      Cranial Nerves: No cranial nerve deficit.      Sensory: No sensory deficit.      Motor: Weakness present.   Psychiatric:         Mood and Affect: Mood normal.         Behavior: Behavior normal.         Thought Content: Thought content normal.         Judgment: Judgment normal.     Fluids  No intake or output data in the 24 hours ending 04/21/23 2009    Laboratory  Recent Labs     04/19/23  0336 04/20/23  0207   WBC 3.7* 3.9*   RBC 3.90* 4.14*   HEMOGLOBIN 10.8* 11.6*   HEMATOCRIT 33.8* 35.3*   MCV 86.7 85.3   MCH 27.7 28.0   MCHC 32.0* 32.9*   RDW 58.3* 56.9*   PLATELETCT 85* 94*   MPV 10.5 10.6     Recent Labs     04/19/23  0336 04/20/23  0207   SODIUM 140 138   POTASSIUM 4.7 4.3   CHLORIDE 112 109   CO2 22 19*   GLUCOSE 89 101*   BUN 16 17   CREATININE 0.88 1.04   CALCIUM 8.4* 8.1*     Recent Labs     04/19/23  1302   INR 1.36*         Recent Labs     04/19/23  0336   TRIGLYCERIDE 37   HDL 45   LDL 30     Imaging  NM-BILIARY (HIDA) SCAN WITH CCK   Final Result      Normal hepatobiliary scan. No evidence of acute cholecystitis.  Normal gallbladder ejection fraction.      CT-KNEE WITH PLUS RECONS LEFT   Final Result      1.  Previous left total knee replacement and fusion. No evidence of periprosthetic fracture or loosening.      CT-EXTREMITY, LOWER WITH RIGHT   Final Result      1.  RIGHT knee prosthesis is normally aligned.   2.  No gross fracture or focal bone destruction.   3.  No significant joint effusion or soft tissue gas.   4.  Artifact limits exam.      CT-ABDOMEN-PELVIS WITH   Final Result      1.  No acute abnormality in the abdomen or pelvis.   2.  Cirrhosis and portal hypertension, including splenomegaly and portosystemic collaterals. No hepatic mass lesions detected.   3.  Cardiomegaly.      US-RUQ   Final Result      1.  Hepatic steatosis and likely cirrhosis.      2.  Dilatation of the main portal vein suggestive of portal  hypertension.      3.  Slight thickening of gallbladder wall 3.5 mm which may be secondary to hypoproteinemia or hypoalbuminemia      DX-CHEST-PORTABLE (1 VIEW)   Final Result      1.  No acute cardiac or pulmonary abnormalities are identified.      2.  Old granulomatous disease.         Assessment/Plan  * Left knee pain- (present on admission)  Assessment & Plan  -Status post left knee prosthetic joint infection 7/2022 + GBS bacteremia.     -On IV ceftriaxone for 4 weeks instead of 6.    -Readmitted 12/2022 due to recurrence.     -I&D 12/28th with for placement of articulating spacer, wound VAC; cultures positive for GBS.     -Placed on IV ceftriaxone until 2/2023.    -Ortho recommended antibiotics for 1 year before reimplantation.     -Was on Augmentin twice daily since 2/15.    -Continue IV Zosyn.  -CT left lower extremity pending.  -Orthopedic surgery consulted.  -ID consulted.    4/20 continue IV antibiotics, ID following  PT OT evaluation, may need skilled placement    Thickening of wall of gallbladder  Assessment & Plan  -Suspect secondary to cirrhosis.  -CT A/P showed gallbladder wall thickening, no calcified gallstones.  -General surgery consulted.   -No indication for acute surgical intervention.  -HIDA scan pending.    4/20 HIDA scan pending, surgery to follow-up    Pancytopenia (HCC)  Assessment & Plan  -Suspect secondary to cirrhosis.    -History of drug-induced (Rocephin in 2022).  -Transfusion if hemoglobin less than 7.  -Transfusion if platelets less than 20 or evidence of active bleeding.    Hyperlipidemia- (present on admission)  Assessment & Plan  -Lipid panel remarkable for total cholesterol 82.  -Continue outpatient atorvastatin.    Paroxysmal atrial fibrillation, hx of- (present on admission)  Assessment & Plan  -[id number] Score: 3.    -Was on apixaban which was discontinue due to bleeding.   -Last cardiology appt on 3/8/23.    -30-day Biotel monitor showed frequent PACs, greater than 7%, no  atrial fibrillation was noted.    Cirrhosis (HCC)- (present on admission)  Assessment & Plan  -Suspect secondary to past chronic alcohol drinking.    -CT A/P demonstrated cirrhosis, portal hypertension, splenomegaly, and portal systemic collaterals.   -Alk phos elevated.  -AST/ALT and tbili WNL.  -Pending INR, unable to calculate MELD.    Hypertension- (present on admission)  Assessment & Plan  -Controlled.    -Restarted outpatient Losartan.  -Continue as needed hydralazine.    Controlled type 2 diabetes mellitus with hyperglycemia, without long-term current use of insulin (HCC)- (present on admission)  Assessment & Plan  -Hemoglobin A1c 5.4% on 1/2023.    -Continue sliding scale insulin with hypoglycemic protocol.    History of rheumatic fever- (present on admission)  Assessment & Plan  -History of rheumatic fever at age 26.  -ECHO on 7/2022 demonstrated EF 60%, with moderate concentric left ventricular hypertrophy, mild mitral regurgitation.    VTE prophylaxis: SCDs/TEDs    I have performed a physical exam and reviewed and updated ROS and Plan today (4/21/2023). In review of yesterday's note (4/20/2023), there are no changes except as documented above.

## 2023-04-22 NOTE — CARE PLAN
The patient is Stable - Low risk of patient condition declining or worsening    Shift Goals  Clinical Goals: Discharge  Patient Goals: Comfort  Family Goals: no family present    Progress made toward(s) clinical / shift goals:    Problem: Pain - Standard  Goal: Alleviation of pain or a reduction in pain to the patient’s comfort goal  Outcome: Progressing     Problem: Knowledge Deficit - Standard  Goal: Patient and family/care givers will demonstrate understanding of plan of care, disease process/condition, diagnostic tests and medications  Outcome: Progressing       Patient is not progressing towards the following goals:

## 2023-04-23 VITALS
DIASTOLIC BLOOD PRESSURE: 74 MMHG | OXYGEN SATURATION: 96 % | SYSTOLIC BLOOD PRESSURE: 131 MMHG | HEART RATE: 60 BPM | HEIGHT: 68 IN | BODY MASS INDEX: 25.43 KG/M2 | TEMPERATURE: 97.6 F | WEIGHT: 167.77 LBS | RESPIRATION RATE: 17 BRPM

## 2023-04-23 LAB
BACTERIA BLD CULT: NORMAL
BACTERIA BLD CULT: NORMAL
GLUCOSE BLD STRIP.AUTO-MCNC: 88 MG/DL (ref 65–99)
SIGNIFICANT IND 70042: NORMAL
SIGNIFICANT IND 70042: NORMAL
SITE SITE: NORMAL
SITE SITE: NORMAL
SOURCE SOURCE: NORMAL
SOURCE SOURCE: NORMAL

## 2023-04-23 PROCEDURE — 700111 HCHG RX REV CODE 636 W/ 250 OVERRIDE (IP): Performed by: STUDENT IN AN ORGANIZED HEALTH CARE EDUCATION/TRAINING PROGRAM

## 2023-04-23 PROCEDURE — A9270 NON-COVERED ITEM OR SERVICE: HCPCS | Performed by: STUDENT IN AN ORGANIZED HEALTH CARE EDUCATION/TRAINING PROGRAM

## 2023-04-23 PROCEDURE — 700102 HCHG RX REV CODE 250 W/ 637 OVERRIDE(OP): Performed by: NURSE PRACTITIONER

## 2023-04-23 PROCEDURE — 99239 HOSP IP/OBS DSCHRG MGMT >30: CPT | Performed by: INTERNAL MEDICINE

## 2023-04-23 PROCEDURE — A9270 NON-COVERED ITEM OR SERVICE: HCPCS | Performed by: INTERNAL MEDICINE

## 2023-04-23 PROCEDURE — A9270 NON-COVERED ITEM OR SERVICE: HCPCS | Performed by: NURSE PRACTITIONER

## 2023-04-23 PROCEDURE — 82962 GLUCOSE BLOOD TEST: CPT

## 2023-04-23 PROCEDURE — 700105 HCHG RX REV CODE 258: Performed by: STUDENT IN AN ORGANIZED HEALTH CARE EDUCATION/TRAINING PROGRAM

## 2023-04-23 PROCEDURE — 700102 HCHG RX REV CODE 250 W/ 637 OVERRIDE(OP): Performed by: INTERNAL MEDICINE

## 2023-04-23 PROCEDURE — 700102 HCHG RX REV CODE 250 W/ 637 OVERRIDE(OP): Performed by: STUDENT IN AN ORGANIZED HEALTH CARE EDUCATION/TRAINING PROGRAM

## 2023-04-23 RX ADMIN — FAMOTIDINE 20 MG: 20 TABLET, FILM COATED ORAL at 05:10

## 2023-04-23 RX ADMIN — CELECOXIB 200 MG: 200 CAPSULE ORAL at 05:10

## 2023-04-23 RX ADMIN — OXYCODONE 5 MG: 5 TABLET ORAL at 05:16

## 2023-04-23 RX ADMIN — LOSARTAN POTASSIUM 100 MG: 50 TABLET, FILM COATED ORAL at 05:10

## 2023-04-23 RX ADMIN — OXYCODONE 5 MG: 5 TABLET ORAL at 10:12

## 2023-04-23 RX ADMIN — PIPERACILLIN AND TAZOBACTAM 3.38 G: 3; .375 INJECTION, POWDER, LYOPHILIZED, FOR SOLUTION INTRAVENOUS; PARENTERAL at 05:13

## 2023-04-23 ASSESSMENT — PAIN DESCRIPTION - PAIN TYPE
TYPE: CHRONIC PAIN

## 2023-04-23 NOTE — PROGRESS NOTES
Received report and assumed care of patient at change of shift. Patient is A&Ox4, on RA, and reports pain of 3/10 in her left knee at this time, but declines interventions. Patient assessment completed, bed in lowest position, and call light and personal belongings are within reach. Patient expressed no further needs at this time.

## 2023-04-23 NOTE — CARE PLAN
The patient is Stable - Low risk of patient condition declining or worsening    Shift Goals  Clinical Goals: IV antibiotic therapy  Patient Goals: Comfort, rest  Family Goals: no family present    Progress made toward(s) clinical / shift goals:  Patient received IV antibiotic therapy as ordered. Patient received pain medication as needed, which adequately reduced her pain level to her comfort goal of being able to sleep comfortably.       Problem: Pain - Standard  Goal: Alleviation of pain or a reduction in pain to the patient’s comfort goal  Outcome: Progressing     Problem: Knowledge Deficit - Standard  Goal: Patient and family/care givers will demonstrate understanding of plan of care, disease process/condition, diagnostic tests and medications  Outcome: Progressing       Patient is not progressing towards the following goals:

## 2023-04-23 NOTE — DISCHARGE SUMMARY
Discharge Summary    CHIEF COMPLAINT ON ADMISSION  Chief Complaint   Patient presents with    Knee Pain    Fatigue       Reason for Admission  Sent by MD     Admission Date  4/18/2023    CODE STATUS  Prior    HPI & HOSPITAL COURSE    April Alicia is a 63 y.o. female with PMH of alcohol, cocaine abuse (currently does not use), cirrhosis, portal hypertension, atrial fibrillation on apixaban, type 2 diabetes mellitus, hypertension, and a complex left knee prosthetic joint infection who presented 4/18/2023 that presented to the ED for suspicion for failing outpatient antibiotic treatment.     Patient was admitted on July 2022 with a group B strep bacteremia, left knee prosthetic joint infection, left shoulder septic arthritis, and subdeltoid abscess.  Plan was for discharge to skilled nursing facility and continue IV ceftriaxone for 6 weeks until 8/30/2022.  Due to some unknown reason, patient only received 4 weeks of antibiotics.  Patient was readmitted on 12/26/2022 due to recurrent left knee pain and swelling.  Was taken back to the OR on 12/28 underwent explantation and placement of articulating spacer and wound VAC.  Multiple OR cultures grew group B Strep.  Underwent synovial fluid removal from the left shoulder on 1/2/2022 which showed WBC 1051, 40% polys, 40% lymphs, not consistent with infection.  Plan was to discharge with 6 weeks of IV antibiotics (ceftriaxone 2 g daily till 2/7/2023).  Per ID documentation, orthopedic MD Garcia recommended oral antibiotics for 1 year prior to reimplantation (status post 2 I's and D's in the past with 2 antibiotic spacers which failed).  Patient was started on Augmentin twice daily on 2/15.  Patient reporting compliance.  Patient had ID appointment on 4/18 and reported general malaise, fatigue, drowsiness, decreased appetite, and left knee swelling for approximately 2 weeks.  Due to concern of failed outpatient treatment, ID recommended presenting to ED ASAP.     In  the ED, vital signs within normal limits.  CBC remarkable for WBC 4.7, hemoglobin 11.5, platelet count 87.  ESR 23.  CMP remarkable for Alk phos 258, albumin 2.8, globulin 3.8.  Lipase 27.  Lactic acid 0.8.  UA unremarkable.  CRP 0.3.  CT A/P demonstrated cirrhosis and portal hypertension, including splenomegaly and portal systemic collaterals; cardiomegaly.  CT left lower extremity pending.      General surgeon, Dr. Oviedo, consulted due to abdominal pain with CT findings.  No indication for acute surgical intervention.  HIDA scan negative, surgery, no surgical intervention at this time.  Abdominal pain improved.  Orthopedic surgeon, Dr. Luz, consulted who recommended IV antibiotics.  Placed on IV Zosyn.  CT previous left total knee replacement and fusion.  No evidence of periprosthetic fracture or loosening.  Right knee prosthesis is normally aligned.  No gross fracture or focal bone destruction.  No significant joint effusion or soft tissue gas.  Patient was advised of above findings.  She has been cleared for discharge by surgery as well as orthopedic surgery.    Per ID recommendation is to switch to cefdinir p.o. 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  Follow-up with ID in 1 to 2 weeks.  Patient has been taken off of IV antibiotics.  Repeat blood cultures remain negative.    Blood sugars appear to be improved.  She is cleared for discharge to home.    Therefore, she is discharged in good and stable condition to home with close outpatient follow-up.    The patient met 2-midnight criteria for an inpatient stay at the time of discharge.    Discharge Date  4/24/23    FOLLOW UP ITEMS POST DISCHARGE  Primary care provider in 1 week  ID in 1 to 2 weeks    DISCHARGE DIAGNOSES  Principal Problem:    Chronic pain of both knees POA: Unknown  Active Problems:    History of rheumatic fever POA: Yes    Controlled type 2 diabetes mellitus with hyperglycemia, without long-term current use of  insulin (HCC) (Chronic) POA: Yes    Hypertension (Chronic) POA: Yes    Cirrhosis (HCC) (Chronic) POA: Yes    Paroxysmal atrial fibrillation, hx of POA: Yes    Hyperlipidemia POA: Yes    Pancytopenia (HCC) POA: Unknown    Epigastric pain POA: Yes    Thickening of wall of gallbladder POA: Unknown    Abdominal pain POA: Yes  Resolved Problems:    Overweight POA: Unknown      FOLLOW UP  Future Appointments   Date Time Provider Department Center   4/25/2023 12:45 PM DANYELL Beach [facility] None   5/17/2023  1:00 PM DANYELL Baez [facility] None     Karen Mcclure P.A.-C.  [address] [facility] [address]  [phone]    Follow up        MEDICATIONS ON DISCHARGE     Medication List        START taking these medications        Instructions   acetaminophen 325 MG Tabs  Commonly known as: Tylenol   Take 2 Tablets by mouth every 6 hours as needed for Mild Pain, Moderate Pain or Fever.  Dose: 650 mg     cefdinir 300 MG Caps  Commonly known as: OMNICEF   Take 1 Capsule by mouth 2 times a day for 30 days.  Dose: 300 mg     ibuprofen 600 MG Tabs  Commonly known as: MOTRIN   Take 1 Tablet by mouth every 6 hours as needed for Moderate Pain.  Dose: 600 mg     senna-docusate 8.6-50 MG Tabs  Commonly known as: PERICOLACE or SENOKOT S   Take 2 Tablets by mouth 2 times a day.  Dose: 2 Tablet            CONTINUE taking these medications        Instructions   atorvastatin 20 MG Tabs  Commonly known as: LIPITOR   Take 1 Tablet by mouth every evening.  Dose: 20 mg     felodipine ER 5 MG Tb24  Commonly known as: Plendil   Take 5 mg by mouth every day.  Dose: 5 mg     ferrous sulfate 325 (65 Fe) MG tablet   Take 325 mg by mouth every day.  Dose: 325 mg     furosemide 20 MG Tabs  Commonly known as: LASIX   Take 1 Tablet by mouth every day.  Dose: 20 mg     gabapentin 400 MG Caps  Commonly known as: NEURONTIN   Take 400 mg by mouth 2 times a day.  Dose: 400 mg     lactulose 20 GM/30ML Soln   Take 15 mL by mouth every day.  Dose:  15 mL     losartan 100 MG Tabs  Commonly known as: COZAAR   Take 100 mg by mouth every day.  Dose: 100 mg     magnesium oxide 400 MG Tabs tablet  Commonly known as: MAG-OX   Take 400 mg by mouth every day.  Dose: 400 mg     pantoprazole 20 MG tablet  Commonly known as: PROTONIX   Take 20 mg by mouth every day.  Dose: 20 mg     potassium chloride 10 MEQ capsule  Commonly known as: MICRO-K   Take 1 Capsule by mouth every day.  Dose: 10 mEq     psyllium 58.12 % Pack  Commonly known as: METAMUCIL   Take 1 Packet by mouth every day.  Dose: 1 Packet     Refresh Tears 0.5 % Soln  Generic drug: carboxymethylcellulose   Administer 2 Drops into both eyes as needed (dry eyes).  Dose: 2 Drop     sodium bicarbonate 650 MG Tabs  Commonly known as: SODIUM BICARBONATE   Take 2 Tablets by mouth 2 times a day.  Dose: 1,300 mg     Spiriva HandiHaler 18 MCG Caps  Generic drug: tiotropium   Place 1 Capsule into inhaler and inhale every day.  Dose: 1 Capsule     Vitamin C 1000 MG Tabs   Take 1,000 mg by mouth every day.  Dose: 1,000 mg     Vitamin D3 50 MCG (2000 UT) Tabs   Take 2,000 Units by mouth every day.  Dose: 2,000 Units     vitamin e 400 UNIT Caps  Commonly known as: VITAMIN E   Take 400 Units by mouth every day.  Dose: 400 Units     zinc sulfate 220 (50 Zn) MG Caps  Commonly known as: ZINCATE   Take 1 Capsule by mouth every day.  Dose: 220 mg            STOP taking these medications      amoxicillin-clavulanate 500-125 MG Tabs  Commonly known as: AUGMENTIN     Multaq 400 MG Tabs  Generic drug: dronedarone              Allergies  Allergies   Allergen Reactions    Nkda [No Known Drug Allergy]        DIET  No orders of the defined types were placed in this encounter.      ACTIVITY  As tolerated.  Weight bearing as tolerated    CONSULTATIONS  ID  General surgery  Orthopedic surgery      PROCEDURES  None    LABORATORY  Lab Results   Component Value Date    SODIUM 138 04/22/2023    POTASSIUM 4.5 04/22/2023    CHLORIDE 112 04/22/2023     CO2 18 (L) 04/22/2023    GLUCOSE 88 04/22/2023    BUN 18 04/22/2023    CREATININE 0.76 04/22/2023    CREATININE 0.9 03/12/2009        Lab Results   Component Value Date    WBC 3.8 (L) 04/22/2023    HEMOGLOBIN 11.7 (L) 04/22/2023    HEMATOCRIT 36.1 (L) 04/22/2023    PLATELETCT 75 (L) 04/22/2023        Total time of the discharge process exceeds 42 minutes.

## 2023-04-23 NOTE — PROGRESS NOTES
April is A&O x4.  OOB independently.  PIV removed.  AVS printed and reviewed with pt.   All questions and concerns addressed.  Highly emphasized finishing all 30 days of abx.  Pt to  rx at [facility].   Pt ambulated off unit with all belongings and family.

## 2023-04-25 ENCOUNTER — APPOINTMENT (OUTPATIENT)
Dept: CARDIOLOGY | Facility: MEDICAL CENTER | Age: 63
End: 2023-04-25
Payer: MEDICAID

## 2023-04-28 DIAGNOSIS — R60.0 BILATERAL LEG EDEMA: ICD-10-CM

## 2023-04-28 RX ORDER — FUROSEMIDE 20 MG/1
20 TABLET ORAL DAILY
Qty: 90 TABLET | Refills: 3 | Status: SHIPPED | OUTPATIENT
Start: 2023-04-28

## 2023-05-08 ENCOUNTER — TELEPHONE (OUTPATIENT)
Dept: INFECTIOUS DISEASES | Facility: MEDICAL CENTER | Age: 63
End: 2023-05-08
Payer: MEDICAID

## 2023-05-08 ENCOUNTER — TELEPHONE (OUTPATIENT)
Dept: CARDIOLOGY | Facility: MEDICAL CENTER | Age: 63
End: 2023-05-08
Payer: MEDICAID

## 2023-05-08 DIAGNOSIS — R60.0 BILATERAL LEG EDEMA: ICD-10-CM

## 2023-05-08 DIAGNOSIS — R10.13 EPIGASTRIC PAIN: ICD-10-CM

## 2023-05-08 PROCEDURE — 1125F AMNT PAIN NOTED PAIN PRSNT: CPT | Performed by: INTERNAL MEDICINE

## 2023-05-08 RX ORDER — POTASSIUM CHLORIDE 750 MG/1
10 CAPSULE, EXTENDED RELEASE ORAL DAILY
Qty: 90 CAPSULE | Refills: 3 | Status: SHIPPED | OUTPATIENT
Start: 2023-05-08

## 2023-05-08 RX ORDER — CEFDINIR 300 MG/1
300 CAPSULE ORAL 2 TIMES DAILY
Qty: 60 CAPSULE | Refills: 1 | Status: CANCELLED | OUTPATIENT
Start: 2023-05-08 | End: 2023-06-07

## 2023-05-08 NOTE — TELEPHONE ENCOUNTER
LVM for patient updating her on Dr Kearney message. Also to return my call if she is able to arrange transportation this week for appt.

## 2023-05-08 NOTE — TELEPHONE ENCOUNTER
Griselda Vazquez M.D.  You 42 minutes ago (10:57 AM)       She was also supposed to FU 1-2 weeks with Wilber after discharge   At this point, schedule FU appt, preferably this week, and we will determine at that time whether or not to resume antibiotics

## 2023-05-08 NOTE — TELEPHONE ENCOUNTER
Patient is scheduled for follow up next Tuesday. Cannot make it this week since she needs to arrange transportation

## 2023-05-08 NOTE — TELEPHONE ENCOUNTER
Original request sent to MR on 5/3/2023, please advise.    Is the patient due for a refill? Yes    Was the patient seen the past year? Yes    Date of last office visit: 1/23/2023    Does the patient have an upcoming appointment?  Yes   If yes, When? 5/18/2023    Provider to refill:    Does the patients insurance require a 100 day supply?  No

## 2023-05-08 NOTE — TELEPHONE ENCOUNTER
MR    Caller: Haroldo from [facility]    Medication Name and Dosage:   potassium chloride (MICRO-K) 10 MEQ capsule    Medication amount left: 0    Preferred Pharmacy:  [facility] [address] [address]   Phone:  [phone]  Fax:  [phone]     Other questions (Topic): N/A    Callback Number (Will only call for issues): [phone]    Thank you,  -Griselda STEELE

## 2023-05-08 NOTE — TELEPHONE ENCOUNTER
Patient was seen in hospital 4/21 and discharged 4/23/23. Per patient she never picked up the Cefdinir abx from [facility] since she uses the [facility] for pharmacy because they deliver her meds. Patient never called the office to let us know until today that she has not been taking the ABX. Requesting we send RX to [facility] and the [facility]

## 2023-05-15 ASSESSMENT — ENCOUNTER SYMPTOMS
ABDOMINAL PAIN: 0
NAUSEA: 0
BLURRED VISION: 0
PALPITATIONS: 0
SHORTNESS OF BREATH: 0
MYALGIAS: 0
FOCAL WEAKNESS: 0
BRUISES/BLEEDS EASILY: 0
WHEEZING: 0
DIZZINESS: 0
HEADACHES: 0
COUGH: 0
FEVER: 0
DOUBLE VISION: 0
SPUTUM PRODUCTION: 0
WEIGHT LOSS: 0
VOMITING: 0

## 2023-05-16 ENCOUNTER — HOSPITAL ENCOUNTER (OUTPATIENT)
Dept: LAB | Facility: MEDICAL CENTER | Age: 63
End: 2023-05-16
Attending: NURSE PRACTITIONER
Payer: MEDICAID

## 2023-05-16 ENCOUNTER — OFFICE VISIT (OUTPATIENT)
Dept: INFECTIOUS DISEASES | Facility: MEDICAL CENTER | Age: 63
End: 2023-05-16
Attending: NURSE PRACTITIONER
Payer: MEDICAID

## 2023-05-16 VITALS
HEART RATE: 73 BPM | TEMPERATURE: 98.6 F | DIASTOLIC BLOOD PRESSURE: 90 MMHG | RESPIRATION RATE: 16 BRPM | OXYGEN SATURATION: 97 % | HEIGHT: 68 IN | WEIGHT: 181.44 LBS | SYSTOLIC BLOOD PRESSURE: 124 MMHG | BODY MASS INDEX: 27.5 KG/M2

## 2023-05-16 DIAGNOSIS — T84.59XD INFECTION OF PROSTHETIC KNEE JOINT, SUBSEQUENT ENCOUNTER: ICD-10-CM

## 2023-05-16 DIAGNOSIS — M00.262 STREPTOCOCCAL ARTHRITIS OF LEFT KNEE (HCC): ICD-10-CM

## 2023-05-16 DIAGNOSIS — B95.1 STREPTOCOCCUS, GROUP B, AS THE CAUSE OF DISEASES CLASSIFIED ELSEWHERE: ICD-10-CM

## 2023-05-16 DIAGNOSIS — Z11.4 SCREENING FOR HIV (HUMAN IMMUNODEFICIENCY VIRUS): ICD-10-CM

## 2023-05-16 DIAGNOSIS — Z96.659 INFECTION OF PROSTHETIC KNEE JOINT, SUBSEQUENT ENCOUNTER: ICD-10-CM

## 2023-05-16 DIAGNOSIS — E11.9 TYPE 2 DIABETES MELLITUS WITHOUT COMPLICATION, WITHOUT LONG-TERM CURRENT USE OF INSULIN (HCC): ICD-10-CM

## 2023-05-16 LAB
ALBUMIN SERPL BCP-MCNC: 3.3 G/DL (ref 3.2–4.9)
ALBUMIN/GLOB SERPL: 0.9 G/DL
ALP SERPL-CCNC: 277 U/L (ref 30–99)
ALT SERPL-CCNC: 31 U/L (ref 2–50)
ANION GAP SERPL CALC-SCNC: 8 MMOL/L (ref 7–16)
AST SERPL-CCNC: 44 U/L (ref 12–45)
BASOPHILS # BLD AUTO: 1.5 % (ref 0–1.8)
BASOPHILS # BLD: 0.07 K/UL (ref 0–0.12)
BILIRUB SERPL-MCNC: 0.9 MG/DL (ref 0.1–1.5)
BUN SERPL-MCNC: 13 MG/DL (ref 8–22)
CALCIUM ALBUM COR SERPL-MCNC: 9.3 MG/DL (ref 8.5–10.5)
CALCIUM SERPL-MCNC: 8.7 MG/DL (ref 8.5–10.5)
CHLORIDE SERPL-SCNC: 113 MMOL/L (ref 96–112)
CO2 SERPL-SCNC: 21 MMOL/L (ref 20–33)
CREAT SERPL-MCNC: 0.67 MG/DL (ref 0.5–1.4)
EOSINOPHIL # BLD AUTO: 0.21 K/UL (ref 0–0.51)
EOSINOPHIL NFR BLD: 4.4 % (ref 0–6.9)
ERYTHROCYTE [DISTWIDTH] IN BLOOD BY AUTOMATED COUNT: 57 FL (ref 35.9–50)
GFR SERPLBLD CREATININE-BSD FMLA CKD-EPI: 98 ML/MIN/1.73 M 2
GLOBULIN SER CALC-MCNC: 3.8 G/DL (ref 1.9–3.5)
GLUCOSE SERPL-MCNC: 107 MG/DL (ref 65–99)
HCT VFR BLD AUTO: 36.3 % (ref 37–47)
HGB BLD-MCNC: 11.7 G/DL (ref 12–16)
HIV 1+2 AB+HIV1 P24 AG SERPL QL IA: NORMAL
IMM GRANULOCYTES # BLD AUTO: 0.02 K/UL (ref 0–0.11)
IMM GRANULOCYTES NFR BLD AUTO: 0.4 % (ref 0–0.9)
LYMPHOCYTES # BLD AUTO: 1.58 K/UL (ref 1–4.8)
LYMPHOCYTES NFR BLD: 32.8 % (ref 22–41)
MCH RBC QN AUTO: 28.8 PG (ref 27–33)
MCHC RBC AUTO-ENTMCNC: 32.2 G/DL (ref 33.6–35)
MCV RBC AUTO: 89.4 FL (ref 81.4–97.8)
MONOCYTES # BLD AUTO: 0.43 K/UL (ref 0–0.85)
MONOCYTES NFR BLD AUTO: 8.9 % (ref 0–13.4)
NEUTROPHILS # BLD AUTO: 2.5 K/UL (ref 2–7.15)
NEUTROPHILS NFR BLD: 52 % (ref 44–72)
NRBC # BLD AUTO: 0 K/UL
NRBC BLD-RTO: 0 /100 WBC
PLATELET # BLD AUTO: 96 K/UL (ref 164–446)
PMV BLD AUTO: 10 FL (ref 9–12.9)
POTASSIUM SERPL-SCNC: 4.9 MMOL/L (ref 3.6–5.5)
PROT SERPL-MCNC: 7.1 G/DL (ref 6–8.2)
RBC # BLD AUTO: 4.06 M/UL (ref 4.2–5.4)
SODIUM SERPL-SCNC: 142 MMOL/L (ref 135–145)
WBC # BLD AUTO: 4.8 K/UL (ref 4.8–10.8)

## 2023-05-16 PROCEDURE — 99213 OFFICE O/P EST LOW 20 MIN: CPT | Performed by: NURSE PRACTITIONER

## 2023-05-16 PROCEDURE — 99211 OFF/OP EST MAY X REQ PHY/QHP: CPT | Performed by: NURSE PRACTITIONER

## 2023-05-16 PROCEDURE — 80053 COMPREHEN METABOLIC PANEL: CPT

## 2023-05-16 PROCEDURE — 3080F DIAST BP >= 90 MM HG: CPT | Performed by: NURSE PRACTITIONER

## 2023-05-16 PROCEDURE — 87389 HIV-1 AG W/HIV-1&-2 AB AG IA: CPT

## 2023-05-16 PROCEDURE — 85025 COMPLETE CBC W/AUTO DIFF WBC: CPT

## 2023-05-16 PROCEDURE — 36415 COLL VENOUS BLD VENIPUNCTURE: CPT

## 2023-05-16 PROCEDURE — 3074F SYST BP LT 130 MM HG: CPT | Performed by: NURSE PRACTITIONER

## 2023-05-16 RX ORDER — AMOXICILLIN AND CLAVULANATE POTASSIUM 875; 125 MG/1; MG/1
1 TABLET, FILM COATED ORAL 2 TIMES DAILY
Qty: 208 TABLET | Refills: 0 | Status: SHIPPED | OUTPATIENT
Start: 2023-05-16 | End: 2023-06-01

## 2023-05-16 ASSESSMENT — ENCOUNTER SYMPTOMS
CHILLS: 0
NERVOUS/ANXIOUS: 0
WEAKNESS: 0

## 2023-05-16 ASSESSMENT — FIBROSIS 4 INDEX: FIB4 SCORE: 4.95

## 2023-05-16 NOTE — PROGRESS NOTES
INFECTIOUS  DISEASE  OUTPATIENT CLINIC  NOTE   Subjective   Primary care provider: Karen Mcclure P.A.-C..     Reason for Follow Up:   Follow-up for   1. Infection of prosthetic knee joint, subsequent encounter  CBC WITH DIFFERENTIAL    Comp Metabolic Panel    amoxicillin-clavulanate (AUGMENTIN) 875-125 MG Tab      2. Streptococcal arthritis of left knee (HCC)        3. Streptococcus, group b, as the cause of diseases classified elsewhere        4. Type 2 diabetes mellitus without complication, without long-term current use of insulin (HCC)        5. Screening for HIV (human immunodeficiency virus)  HIV AG/AB COMBO ASSAY SCREENING          HPI: Patient previously seen and treated by ID team as inpatient during hospital admission.   April Alicia is a 62 y.o. female with a history of alcohol and cocaine abuse, cirrhosis, asthma, A-fib on apixaban, 2 diabetes, hypertension. Patient was admitted on July 2022 with a group B strep bacteremia and left knee prosthetic joint infection along with a left shoulder septic arthritis and some subdeltoid abscess.  Plan was for her to discharge to a skilled nursing facility and continue IV ceftriaxone for 6 weeks until 8/30/2022 due to some unknown reason patient only received 4 weeks of antibiotics.  Patient was readmitted on 12/26/2022 due to recurrent left knee pain and swelling.  She was taken back to the OR on 12/28 underwent explantation and placement of articulating spacer, wound VAC.  Multiple OR cultures again growing group B Strep.   Underwent synovial fluid removal from the left shoulder on 1/2/2022 WBC 1051, 40% polys, 40% lymphs, not consistent with infection.  Plan at discharge with 6 weeks of IV antibiotics ceftriaxone 2 g daily till 2/7/2023    2/15-Orthopedic MD Garcia note recommending patient continue oral antibiotics for at least 1 year prior to reimplantation as she is undergone 2 I&D's in the past with 2 antibiotic spacers which were both failed.   She is at high risk for failing stage II reimplantation and will be subject to amputation if failed again.  We will continue with oral Augmentin 500/ 125 mg twice daily for an additional year    02/16/23- Today Patient reports feeling well. Pt stating that the wound is healing well. Pt being followed by the [facility]. Wound pictures reviewed in EPIC. Denies drainage, pungent odor, redness, pain. Denies feeling generally ill, fevers/chills, general malaise, headache, n/v/d.     3/15/23- Today Patient reports feeling  good. Denies feeling generally ill, fevers/chills, general malaise, headache, n/v/d.  Patient states that she has not taken her Augmentin for the last 3 days as the care facility she was discharged from did not give her a supply.  Medication reordered and to be picked up at her pharmacy.  Tolerating the Augmentin with no complaints of side effects.     4/18/23-patient comes in today with complaints of not feeling well. She reports increasing fatigue, trouble staying awake, and decreasing appetite. Her symptoms started about 2 weeks ago when started to notice increased swelling of her left knee with warmth despite treatment with chronic dosing of Augmentin.  I am concerned that she may have possibly failed outpatient treatment and needs additional services to rule out worsening infection.  Patient escorted to ER    5/16/23- Previously admitted into the ER for further evaluation. Repeat CT  4/19 Previous left total knee replacement and fusion. No evidence of periprosthetic fracture or loosening. No significant joint effusion or soft tissue gas. 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  Plan to transition back to p.o. Augmentin 875/125 mg twice daily due to increased bioavailability.  Most recent labs reviewed from 4/22/2023, chronic leukopenia 3.8 stable, chronic anemia, hypoalbuminemia 2.5.  Patient continues to  have complaints of chronic fatigue, left knee warmth, and bouts of nausea.  HIV screening, repeat CBC/CMP.  Recommended close follow-up with PCP for further evaluation of chronic fatigue and leukopenia.  Previous HIV screening negative 2021      Current Antimicrobials: Augmentin 875/125 mg twice daily, stop date 12/28/23  Previous Antimicrobials: Unasyn, ceftriaxone, Ancef, cefazolin, Augmentin    Other Current Medications:  Home Medications    Medication Sig Taking? Last Dose Authorizing Provider   amoxicillin-clavulanate (AUGMENTIN) 875-125 MG Tab Take 1 Tablet by mouth 2 times a day for 104 days. Yes  Mateus Shell, A.P.R.N.   potassium chloride (MICRO-K) 10 MEQ capsule Take 1 Capsule by mouth every day. Yes Taking Kirstienuzhat Whipple, A.P.R.N.   furosemide (LASIX) 20 MG Tab Take 1 Tablet by mouth every day. Yes Taking Kirstie Whipple, A.P.R.N.   acetaminophen (TYLENOL) 325 MG Tab Take 2 Tablets by mouth every 6 hours as needed for Mild Pain, Moderate Pain or Fever. Yes Taking Poonam Patel D.O.   ibuprofen (MOTRIN) 600 MG Tab Take 1 Tablet by mouth every 6 hours as needed for Moderate Pain. Yes Taking Poonam Patel D.O.   senna-docusate (PERICOLACE OR SENOKOT S) 8.6-50 MG Tab Take 2 Tablets by mouth 2 times a day. Yes Taking Poonam Patel D.O.   pantoprazole (PROTONIX) 20 MG tablet Take 20 mg by mouth every day. Yes Taking Physician Outpatient   losartan (COZAAR) 100 MG Tab Take 100 mg by mouth every day. Yes Taking Physician Outpatient   Ascorbic Acid (VITAMIN C) 1000 MG Tab Take 1,000 mg by mouth every day. Yes Taking Physician Outpatient   vitamin e (VITAMIN E) 400 UNIT Cap Take 400 Units by mouth every day. Yes Taking Physician Outpatient   felodipine ER (PLENDIL) 5 MG TABLET SR 24 HR Take 5 mg by mouth every day. Yes Taking Physician Outpatient   psyllium (METAMUCIL) 58.12 % Pack Take 1 Packet by mouth every day. Yes Taking Physician Outpatient   REFRESH TEARS 0.5 % Solution Administer 2 Drops into both eyes as  needed (dry eyes). Yes Taking Physician Outpatient   Cholecalciferol (VITAMIN D3) 50 MCG (2000 UT) Tab Take 2,000 Units by mouth every day. Yes Taking Physician Outpatient   ferrous sulfate 325 (65 Fe) MG tablet Take 325 mg by mouth every day. Yes Taking Physician Outpatient   magnesium oxide (MAG-OX) 400 MG Tab tablet Take 400 mg by mouth every day. Yes Taking Physician Outpatient   zinc sulfate (ZINCATE) 220 (50 Zn) MG Cap Take 1 Capsule by mouth every day. Yes Taking Rhys Martínez D.O.   sodium bicarbonate (SODIUM BICARBONATE) 650 MG Tab Take 2 Tablets by mouth 2 times a day. Yes Taking JANIS Persaud.O.   lactulose 20 GM/30ML Solution Take 15 mL by mouth every day. Yes Taking Rhys Martínez D.O.   atorvastatin (LIPITOR) 20 MG Tab Take 1 Tablet by mouth every evening. Yes Taking JANIS Persaud.O.   SPIRIVA HANDIHALER 18 MCG Cap Place 1 Capsule into inhaler and inhale every day. Yes Taking Physician Outpatient   gabapentin (NEURONTIN) 400 MG Cap Take 400 mg by mouth 2 times a day. Yes Taking Physician Outpatient        PMH:  Past Medical History:   Diagnosis Date    Arrhythmia     Arthritis     OA in knees    ASTHMA     Blood clotting disorder (HCC) 2018    right leg blood clot    Dental disorder     upper and lower dentures    Diabetes     Emphysema of lung (HCC)     Heart abnormality     leakage in left valve    Heart burn     left knee    Heart valve disease     Hypertension     Infection 9/4/2017    Infection 2018    Right knee after total knee    Liver disease     Pneumonia 02/2020    Seizure disorder (HCC)      Past Surgical History:   Procedure Laterality Date    PB REVISE KNEE JOINT REPLACE,ALL PARTS Left 12/28/2022    Procedure: REVISION, TOTAL ARTHROPLASTY, KNEE, ALL COMPONENTS-LEFT;  Surgeon: Wild Luz M.D.;  Location: SURGERY [facility];  Service: Orthopedics    IRRIGATION & DEBRIDEMENT GENERAL Left 12/28/2022    Procedure: IRRIGATION AND DEBRIDEMENT, WOUND;  Surgeon: Wild Luz  M.D.;  Location: SURGERY [facility];  Service: Orthopedics    PB REVISE KNEE JOINT REPLACE,ALL PARTS Left 2022    Procedure: REVISION, TOTAL ARTHROPLASTY, KNEE, ALL COMPONENTS - ANTIBIOTIC SPACER;  Surgeon: Wild Luz M.D.;  Location: SURGERY [facility];  Service: Orthopedics    IRRIGATION & DEBRIDEMENT GENERAL Left 2022    Procedure: IRRIGATION AND DEBRIDEMENT, SHOULDER;  Surgeon: Obdulio Gray M.D.;  Location: SURGERY [facility];  Service: Orthopedics    PB TOTAL KNEE ARTHROPLASTY Left 2020    Procedure: ARTHROPLASTY, KNEE, TOTAL;  Surgeon: Víctor Faustin M.D.;  Location: SURGERY [facility];  Service: Orthopedics    KNEE ARTHROPLASTY TOTAL Right 2018    IRRIGATION & DEBRIDEMENT ORTHO Right 9/3/2017    Procedure: IRRIGATION & DEBRIDEMENT ORTHO, POLY EXCHANGE;  Surgeon: Jerome Russell M.D.;  Location: SURGERY [facility];  Service: Orthopedics    COLONOSCOPY WITH CLIPPING  10/28/2015    Procedure: COLONOSCOPY WITH CLIPPING;  Surgeon: Ruben Colon M.D.;  Location: ENDOSCOPY [facility];  Service:     COLONOSCOPY WITH SCLEROTHERAPY  10/28/2015    Procedure: COLONOSCOPY WITH SCLEROTHERAPY;  Surgeon: Ruben Colon M.D.;  Location: [facility];  Service:     COLONOSCOPY WITH TATTOOING  10/28/2015    Procedure: COLONOSCOPY WITH TATTOOING;  Surgeon: Ruben Cooln M.D.;  Location: [facility];  Service:     GYN SURGERY      hysteroscoopy    GYN SURGERY  1982    tubal ligation     History reviewed. No pertinent family history.  Social History     Socioeconomic History    Marital status: Single     Spouse name: Not on file    Number of children: Not on file    Years of education: Not on file    Highest education level: Not on file   Occupational History    Not on file   Tobacco Use    Smoking status: Former     Types: Cigarettes     Quit date: 2016     Years since quittin.3    Smokeless tobacco: Former     Quit date: 2021     Tobacco comments:     a few a day when I can   Vaping Use    Vaping Use: Never used   Substance and Sexual Activity    Alcohol use: Not Currently    Drug use: Not Currently    Sexual activity: Not on file   Other Topics Concern    Not on file   Social History Narrative    Not on file     Social Determinants of Health     Financial Resource Strain: Low Risk  (1/28/2020)    Overall Financial Resource Strain (CARDIA)     Difficulty of Paying Living Expenses: Not hard at all   Food Insecurity: No Food Insecurity (1/28/2020)    Hunger Vital Sign     Worried About Running Out of Food in the Last Year: Never true     Ran Out of Food in the Last Year: Never true   Transportation Needs: No Transportation Needs (1/28/2020)    PRAPARE - Transportation     Lack of Transportation (Medical): No     Lack of Transportation (Non-Medical): No   Physical Activity: Not on file   Stress: Not on file   Social Connections: Not on file   Intimate Partner Violence: Not on file   Housing Stability: Not on file           Allergies/Intolerances:  Allergies   Allergen Reactions    Nkda [No Known Drug Allergy]        ROS:   Review of Systems   Constitutional:  Positive for malaise/fatigue. Negative for chills, fever and weight loss.   HENT:  Negative for congestion and hearing loss.    Eyes:  Negative for blurred vision and double vision.   Respiratory:  Negative for cough, sputum production, shortness of breath and wheezing.    Cardiovascular:  Negative for chest pain and palpitations.   Gastrointestinal:  Negative for abdominal pain, nausea and vomiting.   Genitourinary:  Negative for dysuria.   Musculoskeletal:  Positive for joint pain (Chronic left shoulder pain). Negative for myalgias.   Skin:  Negative for itching and rash.   Neurological:  Negative for dizziness, focal weakness, weakness and headaches.   Endo/Heme/Allergies:  Does not bruise/bleed easily.   Psychiatric/Behavioral:  The patient is not nervous/anxious.       ROS was  "reviewed and were negative except as above.    Objective    Most Recent Vital Signs:  Blood Pressure (Abnormal) 124/90 (BP Location: Left arm, Patient Position: Sitting, BP Cuff Size: Adult)   Pulse 73   Temperature 37 °C (98.6 °F) (Temporal)   Respiration 16   Height 1.727 m (5' 8\")   Weight 82.3 kg (181 lb 7 oz)   Last Menstrual Period 06/02/2008   Oxygen Saturation 97%   Body Mass Index 27.59 kg/m²     Physical Exam:  Physical Exam  Vitals and nursing note reviewed.   Constitutional:       General: She is not in acute distress.     Appearance: Normal appearance.      Comments: Walker   HENT:      Head: Normocephalic and atraumatic.      Nose: Nose normal.      Mouth/Throat:      Mouth: Mucous membranes are moist.   Eyes:      Pupils: Pupils are equal, round, and reactive to light.   Cardiovascular:      Rate and Rhythm: Normal rate and regular rhythm.   Pulmonary:      Effort: Pulmonary effort is normal. No respiratory distress.      Breath sounds: Normal breath sounds. No stridor.   Abdominal:      General: There is no distension.      Palpations: Abdomen is soft.   Musculoskeletal:         General: Tenderness present. No swelling.      Cervical back: Normal range of motion.      Comments: Left knee surgical scar, no erythema, warmth on palpation, no swelling or open wounds   Skin:     General: Skin is warm and dry.      Coloration: Skin is not jaundiced or pale.   Neurological:      General: No focal deficit present.      Mental Status: She is oriented to person, place, and time.      Cranial Nerves: No cranial nerve deficit.      Sensory: No sensory deficit.   Psychiatric:         Mood and Affect: Mood normal.         Behavior: Behavior normal.          Pertinent Lab/Imaging Results:  [unfilled]  @CMP@  WBC   Date/Time Value Ref Range Status   04/22/2023 06:42 AM 3.8 (L) 4.8 - 10.8 K/uL Final     RBC   Date/Time Value Ref Range Status   04/22/2023 06:42 AM 4.19 (L) 4.20 - 5.40 M/uL Final     Hemoglobin "   Date/Time Value Ref Range Status   04/22/2023 06:42 AM 11.7 (L) 12.0 - 16.0 g/dL Final     Hematocrit   Date/Time Value Ref Range Status   04/22/2023 06:42 AM 36.1 (L) 37.0 - 47.0 % Final     MCV   Date/Time Value Ref Range Status   04/22/2023 06:42 AM 86.2 81.4 - 97.8 fL Final     MCH   Date/Time Value Ref Range Status   04/22/2023 06:42 AM 27.9 27.0 - 33.0 pg Final     MCHC   Date/Time Value Ref Range Status   04/22/2023 06:42 AM 32.4 (L) 33.6 - 35.0 g/dL Final     MPV   Date/Time Value Ref Range Status   04/22/2023 06:42 AM 10.0 9.0 - 12.9 fL Final      Sodium   Date/Time Value Ref Range Status   04/22/2023 06:42  135 - 145 mmol/L Final     Potassium   Date/Time Value Ref Range Status   04/22/2023 06:42 AM 4.5 3.6 - 5.5 mmol/L Final     Chloride   Date/Time Value Ref Range Status   04/22/2023 06:42  96 - 112 mmol/L Final     Co2   Date/Time Value Ref Range Status   04/22/2023 06:42 AM 18 (L) 20 - 33 mmol/L Final     Glucose   Date/Time Value Ref Range Status   04/22/2023 06:42 AM 88 65 - 99 mg/dL Final     Bun   Date/Time Value Ref Range Status   04/22/2023 06:42 AM 18 8 - 22 mg/dL Final     Creatinine   Date/Time Value Ref Range Status   04/22/2023 06:42 AM 0.76 0.50 - 1.40 mg/dL Final   03/12/2009 06:50 PM 0.9 0.5 - 1.4 mg/dL Final     Bun-Creatinine Ratio   Date/Time Value Ref Range Status   03/08/2022 10:02 PM 12.0  Final     Alkaline Phosphatase   Date/Time Value Ref Range Status   04/22/2023 06:42  (H) 30 - 99 U/L Final     AST(SGOT)   Date/Time Value Ref Range Status   04/22/2023 06:42 AM 25 12 - 45 U/L Final     ALT(SGPT)   Date/Time Value Ref Range Status   04/22/2023 06:42 AM 18 2 - 50 U/L Final     Total Bilirubin   Date/Time Value Ref Range Status   04/22/2023 06:42 AM 0.7 0.1 - 1.5 mg/dL Final      CPK Total   Date/Time Value Ref Range Status   08/04/2022 03:17  (H) 0 - 154 U/L Final          Blood Culture   Date Value Ref Range Status   09/04/2017 No growth after 5 days of  incubation.  Final     Blood Culture Hold   Date Value Ref Range Status   10/03/2020 Collected  Final       Blood Culture   Date Value Ref Range Status   09/04/2017 No growth after 5 days of incubation.  Final     Blood Culture Hold   Date Value Ref Range Status   10/03/2020 Collected  Final    Micro:  Results         Procedure Component Value Units Date/Time     COV-2, FLU A/B, AND RSV BY PCR (2-4 HOURS CEPHEID): Collect NP swab in VTM [[id number]] Collected: 01/05/23 1531     Order Status: Completed Specimen: Respirate from Nasopharyngeal Updated: 01/05/23 1629       Influenza virus A RNA Negative       Influenza virus B, PCR Negative       RSV, PCR Negative       SARS-CoV-2 by PCR NotDetected       Comment: [facility] providers: PLEASE REFER TO DE-ESCALATION AND RETESTING PROTOCOL  on [email or web address]     **The [employer] GeneXpert Xpress SARS-CoV-2 RT-PCR Test has been made  available for use under the Emergency Use Authorization (EUA) only.             SARS-CoV-2 Source NP Swab     Narrative:       Collected By: [id number] JUAN CARLOS RAMOS     URINALYSIS [[id number]]  (Abnormal) Collected: 01/05/23 1515     Order Status: Completed Specimen: Urine, Clean Catch Updated: 01/05/23 1603       Color Yellow       Character Clear       Specific Gravity 1.014       Ph 6.5       Glucose Negative mg/dL         Ketones Negative mg/dL         Protein Negative mg/dL         Bilirubin Negative       Urobilinogen, Urine 0.2       Nitrite Negative       Leukocyte Esterase Negative       Occult Blood Small       Micro Urine Req Microscopic     Narrative:       Collected By: [id number] JUAN CARLOS RAMOS     FLUID CULTURE W/GRAM STAIN [[id number]] Collected: 01/02/23 1545     Order Status: Completed Specimen: Synovial Updated: 01/05/23 0945       Significant Indicator NEG       Source SYNO       Site Left Shoulder       Culture Result No growth at 72 hours.       Gram Stain Result Rare WBCs.  No organisms seen.        BLOOD CULTURE  "[[id number]] Collected: 01/03/23 0842     Order Status: Completed Specimen: Blood from Peripheral Updated: 01/04/23 0728       Significant Indicator NEG       Source BLD       Site PERIPHERAL       Culture Result No Growth  Note: Blood cultures are incubated for 5 days and  are monitored continuously.Positive blood cultures  are called to the RN and reported as soon as  they are identified.        Narrative:       Per Hospital Policy: Only change Specimen Src: to \"Line\" if  specified by physician order.  Right Hand     BLOOD CULTURE [[id number]] Collected: 01/03/23 0842     Order Status: Completed Specimen: Blood from Peripheral Updated: 01/04/23 0728       Significant Indicator NEG       Source BLD       Site PERIPHERAL       Culture Result No Growth  Note: Blood cultures are incubated for 5 days and  are monitored continuously.Positive blood cultures  are called to the RN and reported as soon as  they are identified.        Narrative:       Per Hospital Policy: Only change Specimen Src: to \"Line\" if  specified by physician order.  Left Forearm/Arm     URINALYSIS [[id number]]  (Abnormal) Collected: 01/03/23 1952     Order Status: Completed Specimen: Urine, Cath Updated: 01/03/23 2013       Color Yellow       Character Clear       Specific Gravity 1.020       Ph 5.5       Glucose Negative mg/dL         Ketones Negative mg/dL         Protein Negative mg/dL         Bilirubin Negative       Urobilinogen, Urine 0.2       Nitrite Negative       Leukocyte Esterase Negative       Occult Blood Small       Micro Urine Req Microscopic     Narrative:       Collected By: [id number] CECI BRANDT     GRAM STAIN [[id number]] Collected: 01/02/23 1545     Order Status: Completed Specimen: Synovial Updated: 01/02/23 1836       Significant Indicator .       Source SYNO       Site Left Shoulder       Gram Stain Result Rare WBCs.  No organisms seen.        FLUID CULTURE W/GRAM STAIN [[id number]]       Order Status: No result Specimen: Body Fluid " "from Synovial Fluid       Blood Culture [[id number]] Collected: 12/26/22 2202     Order Status: Completed Specimen: Blood from Peripheral Updated: 12/31/22 2300       Significant Indicator NEG       Source BLD       Site PERIPHERAL       Culture Result No growth after 5 days of incubation.     Narrative:       1 of 2 for Blood Culture x 2 sites order. Per Hospital  Policy: Only change Specimen Src: to \"Line\" if specified by  physician order.  No site indicated     Blood Culture [[id number]] Collected: 12/26/22 2225     Order Status: Completed Specimen: Blood from Peripheral Updated: 12/31/22 2300       Significant Indicator NEG       Source BLD       Site PERIPHERAL       Culture Result No growth after 5 days of incubation.     Narrative:       2 of 2 blood culture x2  Sites order. Per Hospital Policy:  Only change Specimen Src: to \"Line\" if specified by physician  order.  Left Hand     CULTURE TISSUE W/ GRM STAIN [[id number]]  (Abnormal) Collected: 12/28/22 1351     Order Status: Completed Specimen: Tissue Updated: 12/31/22 1205       Significant Indicator POS       Source TISS       Site Left anterior tibia       Culture Result -       Gram Stain Result Rare WBCs.  No organisms seen.          Culture Result Streptococcus agalactiae (Group B)  Light growth       Narrative:       Surgery Specimen     Anaerobic Culture [[id number]] Collected: 12/28/22 1351     Order Status: Completed Specimen: Tissue Updated: 12/31/22 1205       Significant Indicator NEG       Source TISS       Site Left anterior tibia       Culture Result No Anaerobes isolated.     Narrative:       Surgery Specimen     CULTURE TISSUE W/ GRM STAIN [[id number]]  (Abnormal) Collected: 12/28/22 1350     Order Status: Completed Specimen: Tissue Updated: 12/31/22 1201       Significant Indicator POS       Source TISS       Site Left knee lateral gutter       Culture Result -       Gram Stain Result Many WBCs.  No organisms seen.          Culture Result " Streptococcus agalactiae (Group B)  Light growth       Narrative:       Surgery Specimen     Anaerobic Culture [[id number]] Collected: 12/28/22 1350     Order Status: Completed Specimen: Tissue Updated: 12/31/22 1201       Significant Indicator NEG       Source TISS       Site Left knee lateral gutter       Culture Result No Anaerobes isolated.     Narrative:       Surgery Specimen     CULTURE TISSUE W/ GRM STAIN [[id number]]  (Abnormal) Collected: 12/28/22 1432     Order Status: Completed Specimen: Tissue Updated: 12/31/22 1200       Significant Indicator POS       Source TISS       Site Left femur       Culture Result -       Gram Stain Result Rare WBCs.  No organisms seen.          Culture Result Streptococcus agalactiae (Group B)  Light growth       Narrative:       Surgery Specimen     Anaerobic Culture [[id number]] Collected: 12/28/22 1432     Order Status: Completed Specimen: Tissue Updated: 12/31/22 1200       Significant Indicator NEG       Source TISS       Site Left femur       Culture Result No Anaerobes isolated.     Narrative:       Surgery Specimen     CULTURE TISSUE W/ GRM STAIN [[id number]]  (Abnormal) Collected: 12/28/22 1433     Order Status: Completed Specimen: Tissue Updated: 12/31/22 1159       Significant Indicator POS       Source TISS       Site Left Tibia       Culture Result -       Gram Stain Result Many WBCs.  No organisms seen.          Culture Result Streptococcus agalactiae (Group B)  Light growth       Narrative:       Surgery Specimen     Anaerobic Culture [[id number]] Collected: 12/28/22 1433     Order Status: Completed Specimen: Tissue Updated: 12/31/22 1159       Significant Indicator NEG       Source TISS       Site Left Tibia       Culture Result No Anaerobes isolated.     Narrative:       Surgery Specimen     CULTURE TISSUE W/ GRM STAIN [[id number]]  (Abnormal) Collected: 12/28/22 1349     Order Status: Completed Specimen: Tissue Updated: 12/31/22 1159       Significant Indicator  POS       Source TISS       Site Left Knee Medial Gutter       Culture Result -       Gram Stain Result Rare WBCs.  No organisms seen.          Culture Result Streptococcus agalactiae (Group B)  Light growth       Narrative:       Surgery Specimen     Anaerobic Culture [[id number]] Collected: 12/28/22 1349     Order Status: Completed Specimen: Tissue Updated: 12/31/22 1159       Significant Indicator NEG       Source TISS       Site Left Knee Medial Gutter       Culture Result No Anaerobes isolated.     Narrative:       Surgery Specimen     FLUID CULTURE W/GRAM STAIN  Order: [id number]  Status: Final result     Visible to patient: No (inaccessible in MyChart)     Next appt: Today at 11:00 AM in Infectious Diseases (Mateus Shell, A.P.R.N.)     Specimen Information: Synovial Fluid   0 Result Notes      Component 1 mo ago   Significant Indicator POS Positive (POS)    Source SYNO    Site Left Knee    Culture Result - Abnormal     Gram Stain Result  Abnormal   Many WBCs.   Rare Gram positive cocci.     Culture Result  Abnormal   Streptococcus agalactiae (Group B)   Light growth   This isolate does NOT demonstrate inducible Clindamycin   resistance in vitro.     Resulting Agency M        Susceptibility     Streptococcus agalactiae (group b)     [facility]     Clindamycin <=0.25 mcg/mL Sensitive     Daptomycin <=0.5 mcg/mL Sensitive     Penicillin <=0.03 mcg/mL Sensitive                     Impression/Assessment      1. Infection of prosthetic knee joint, subsequent encounter  CBC WITH DIFFERENTIAL    Comp Metabolic Panel    amoxicillin-clavulanate (AUGMENTIN) 875-125 MG Tab      2. Streptococcal arthritis of left knee (HCC)        3. Streptococcus, group b, as the cause of diseases classified elsewhere        4. Type 2 diabetes mellitus without complication, without long-term current use of insulin (HCC)        5. Screening for HIV (human immunodeficiency virus)  HIV AG/AB COMBO ASSAY SCREENING        April Alicia is  a 62 y.o. female with a history of alcohol and cocaine abuse, cirrhosis, asthma, A-fib on apixaban, 2 diabetes, hypertension. Patient was admitted on July 2022 with a group B strep bacteremia and left knee prosthetic joint infection along with a left shoulder septic arthritis and some subdeltoid abscess.  Plan was for her to discharge to a skilled nursing facility and continue IV ceftriaxone for 6 weeks until 8/30/2022 due to some unknown reason patient only received 4 weeks of antibiotics.  Patient was readmitted on 12/26/2022 due to recurrent left knee pain and swelling.  She was taken back to the OR on 12/28 underwent explantation and placement of articulating  spacer, wound VAC.  Multiple OR cultures again growing group B Strep.   Underwent synovial fluid removal from the left shoulder on 1/2/2022 WBC 1051, 40% polys, 40% lymphs, not consistent with infection.  Streptococcus group B (penicillin sensitive). Plan at discharge with 6 weeks of IV antibiotics ceftriaxone 2 g daily till 2/7/2023. Orthopedic MD Garcia note recommending patient continue oral antibiotics for at least 1 year prior to reimplantation as she is undergone 2 I&D's in the past with 2 antibiotic spacers which were both failed.  She is at high risk for failing stage II reimplantation and will be subject to amputation if failed again.      Previously admitted into the ER for further evaluation. Repeat CT  4/19 Previous left total knee replacement and fusion. No evidence of periprosthetic fracture or loosening. No significant joint effusion or soft tissue gas. 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  Plan to transition back to p.o. Augmentin 875/125 mg twice daily due to increased bioavailability. chronic leukopenia 3.8 stable,  HIV screening ordered.  Recommended close follow-up with PCP for further evaluation of chronic fatigue and leukopenia.  Previous HIV  screening negative.     PLAN:   -Stop p.o. cefdinir  -Start p.o. Augmentin 875/125 mg twice daily, end date 12/28/2023.  Medication education provided and side effects to monitor   -Repeat CBC/CMP ordered  -HIV screening ordered  -Recommend close follow-up with PCP for eval for chronic fatigue and leukopenia  -Continue routine follow-up with orthopedic surgery  -Recommend to keep blood sugars below 140 to allow for adequate wound healing and prevent secondary infection  -Education provided on worsening signs and symptoms of infection and when to report to ER/call 911      Return visit: 2 weeks. follow up with primary care physician for chronic medical problems      I have performed a physical exam,  updated ROS and plan today. I have reviewed previous images, labs, and provider notes.      EDITH Baez.    All Patients should seek medical re-evaluation or report to the ER for new, increasing or worsening symptoms. In some circumstances medical conditions can change from the initial evaluation and may require emergent medical re-evaluation. This includes but is not limited to chest pain, shortness of breath, atypical abdominal pain, atypical headache, ALOC, fever >101, low blood pressure, high respiratory rate (above 30), low oxygen saturation (below 90%), acute delirium, abnormal bleeding, inability to tolerate any intake, weakness on one side of the body, any worsened or concerning conditions.    Please note that this dictation was created using voice recognition software. I have worked with technical experts from [facility] to optimize the interface.  I have made every reasonable attempt to correct obvious errors, but there may be errors of grammar and possibly content that I did not discover before finalizing the note.

## 2023-05-18 ENCOUNTER — OFFICE VISIT (OUTPATIENT)
Dept: CARDIOLOGY | Facility: MEDICAL CENTER | Age: 63
End: 2023-05-18
Attending: NURSE PRACTITIONER
Payer: MEDICAID

## 2023-05-18 VITALS
HEART RATE: 52 BPM | DIASTOLIC BLOOD PRESSURE: 60 MMHG | SYSTOLIC BLOOD PRESSURE: 102 MMHG | WEIGHT: 191 LBS | OXYGEN SATURATION: 98 % | HEIGHT: 68 IN | RESPIRATION RATE: 16 BRPM | BODY MASS INDEX: 28.95 KG/M2

## 2023-05-18 DIAGNOSIS — Z86.79 HISTORY OF HYPERTENSION: ICD-10-CM

## 2023-05-18 DIAGNOSIS — I48.0 PAROXYSMAL ATRIAL FIBRILLATION (HCC): ICD-10-CM

## 2023-05-18 DIAGNOSIS — E11.00 TYPE 2 DIABETES MELLITUS WITH HYPEROSMOLARITY WITHOUT COMA, WITHOUT LONG-TERM CURRENT USE OF INSULIN (HCC): ICD-10-CM

## 2023-05-18 DIAGNOSIS — I49.3 PVC (PREMATURE VENTRICULAR CONTRACTION): ICD-10-CM

## 2023-05-18 DIAGNOSIS — E78.00 PURE HYPERCHOLESTEROLEMIA: ICD-10-CM

## 2023-05-18 PROBLEM — D68.318 ACQUIRED CIRCULATING ANTICOAGULANTS (HCC): Status: RESOLVED | Noted: 2020-01-28 | Resolved: 2023-05-18

## 2023-05-18 PROCEDURE — 3074F SYST BP LT 130 MM HG: CPT | Performed by: NURSE PRACTITIONER

## 2023-05-18 PROCEDURE — 99214 OFFICE O/P EST MOD 30 MIN: CPT | Performed by: NURSE PRACTITIONER

## 2023-05-18 PROCEDURE — 99213 OFFICE O/P EST LOW 20 MIN: CPT | Performed by: NURSE PRACTITIONER

## 2023-05-18 PROCEDURE — 3078F DIAST BP <80 MM HG: CPT | Performed by: NURSE PRACTITIONER

## 2023-05-18 RX ORDER — METOPROLOL SUCCINATE 25 MG/1
25 TABLET, EXTENDED RELEASE ORAL NIGHTLY
Qty: 30 TABLET | Refills: 11 | Status: SHIPPED | OUTPATIENT
Start: 2023-05-18

## 2023-05-18 ASSESSMENT — FIBROSIS 4 INDEX: FIB4 SCORE: 5.19

## 2023-05-18 NOTE — PROGRESS NOTES
"      Cardiology Clinic Follow-up Note    Date of note:    5/18/2023  Primary Care Provider: Juanita King N.P.    Name:             April Alicia  YOB: 1960  MRN:               [id number]    CC: ? PAF, PVCs    Primary Cardiologist: Previously Dr Dixon    Patient HPI:   pAril Alicia is a 63 y.o. female with current medical problems including hypertension, diabetes, alcoholic liver cirrhosis and history of rheumatic fever At the age of 26.     Interim History:  Having worsening problems wit L knee, feels like its infected, currently on abx.  Has follow-up soon to get an x-ray.  No cardiac complaints. She denies palpitations, chest pain, shortness of breath, dyspnea on exertion, dizziness or syncopal episodes, orthopnea, PND, lower extremity swelling, and recent weight gain.   Increased fatigue, SOB with laying flat.   No Afib noted on cardiac monitor, PVC's and PAC's present  Stopped taking Multaq over 1 week ago.  Ha not noticed any palpitations or arrhythmias    Per last office visit note 1/23/23  Since last office visit patient was seen in the ED on 12/26/22 for a septic joint at the left knee.   Currently at a SNF since discharge from hospital.  Gets dizzy with palpitations   Was taken off of apixaban during last hospitalization due to low H&H.  Noticed more swelling to her bilateral lower extremities as she spends most of her time sitting in a wheelchair.  She denies chest pain, shortness of breath, dyspnea on exertion, syncopal episodes, orthopnea, PND, and recent weight gain.     Per my last office visit note on 2/24/2022  Pt presents today still feeling liker her \"heart hurts\" and feeling chest pressure.  She did not schedule her nuclear medicine stress test that was ordered at her last appointment.  She also feels much more tired today than usual.    She denies palpitations, shortness of breath, dyspnea on exertion, dizziness or syncopal episodes, orthopnea, PND, lower " extremity swelling, and recent weight gain.     Per last office visit note on 1/25/22  Ms. Alicia was last seen in this cardiology office by Dr. Livingston in 2017, lost to follow-up.  During that visit she was placed on a Holter monitor for her bradycardia.    She has been seeing Dr. Dixon ( Clinic), has not seen him in 1 yr. She says he diagnosed her with atrial fibrillation.    During this past summer in [place] she had complications with L knee surgery, infections, and needing skin grafts. Was in the hospital 3.5 months.  Never noted to be in A. fib    She was told she had sleep apnea, but then it improved and her machine was taken back by company.      Per patient she has stopped consuming alcohol.    She get chest pain/pressure when she cleans the house, she feels this exhausts her, also causes her to sweat and have SOB.  She does feel palpitations at times.  She has positive swelling to bilateral lower extremities, on furosemide after her discharge from the hospital in [place], ran out at the end of November and notices that her swelling is increasing.    She denies dizziness or syncopal episodes, orthopnea, PND, and recent weight gain.     Patient endorses medication compliance.  Has never been on a statin medication    Cardiovascular Risk Factors:  1. Smoking status: former, quit last year  2. Type II Diabetes Mellitus: yes  Lab Results   Component Value Date/Time    HBA1C 5.4 01/01/2023 03:23 AM    HBA1C 5.7 (H) 07/12/2022 03:54 AM     3. Hypertension: Yes  4. Dyslipidemia:   Cholesterol,Tot   Date Value Ref Range Status   09/04/2017 95 (L) 100 - 199 mg/dL Final     LDL   Date Value Ref Range Status   09/04/2017 52 <100 mg/dL Final     HDL   Date Value Ref Range Status   09/04/2017 29 (A) >=40 mg/dL Final     Triglycerides   Date Value Ref Range Status   09/04/2017 69 0 - 149 mg/dL Final     5. Family history of early Coronary Artery Disease in a first degree relative (Male less than 55  years of age; Female less than 65 years of age): no  6.  Obesity and/or Metabolic Syndrome: BMI 28  7. Sedentary lifestyle: does House chores    Review of systems:  All others systems reviewed and negative except for what is outlined in the above HPI    Past Medical History:   Diagnosis Date    Arrhythmia     Arthritis     OA in knees    ASTHMA     Blood clotting disorder (HCC) 2018    right leg blood clot    Dental disorder     upper and lower dentures    Diabetes     Emphysema of lung (HCC)     Heart abnormality     leakage in left valve    Heart burn     left knee    Heart valve disease     Hypertension     Infection 9/4/2017    Infection 2018    Right knee after total knee    Liver disease     Pneumonia 02/2020    Seizure disorder (HCC)      Past Surgical History:   Procedure Laterality Date    PB REVISE KNEE JOINT REPLACE,ALL PARTS Left 12/28/2022    Procedure: REVISION, TOTAL ARTHROPLASTY, KNEE, ALL COMPONENTS-LEFT;  Surgeon: Wild Luz M.D.;  Location: [facility];  Service: Orthopedics    IRRIGATION & DEBRIDEMENT GENERAL Left 12/28/2022    Procedure: IRRIGATION AND DEBRIDEMENT, WOUND;  Surgeon: Wild Luz M.D.;  Location: [facility];  Service: Orthopedics    PB REVISE KNEE JOINT REPLACE,ALL PARTS Left 7/19/2022    Procedure: REVISION, TOTAL ARTHROPLASTY, KNEE, ALL COMPONENTS - ANTIBIOTIC SPACER;  Surgeon: Wild Luz M.D.;  Location: [facility];  Service: Orthopedics    IRRIGATION & DEBRIDEMENT GENERAL Left 7/17/2022    Procedure: IRRIGATION AND DEBRIDEMENT, SHOULDER;  Surgeon: Obdulio Gray M.D.;  Location: [facility];  Service: Orthopedics    PB TOTAL KNEE ARTHROPLASTY Left 8/24/2020    Procedure: ARTHROPLASTY, KNEE, TOTAL;  Surgeon: Víctor Faustin M.D.;  Location: [facility];  Service: Orthopedics    KNEE ARTHROPLASTY TOTAL Right 2018    IRRIGATION & DEBRIDEMENT ORTHO Right 9/3/2017    Procedure: IRRIGATION & DEBRIDEMENT ORTHO, POLY  EXCHANGE;  Surgeon: Jerome Russell M.D.;  Location: SUR[facility];  Service: Orthopedics    COLONOSCOPY WITH CLIPPING  10/28/2015    Procedure: COLONOSCOPY WITH CLIPPING;  Surgeon: Ruben Colon M.D.;  Location: ENDOSCOPY [facility];  Service:     COLONOSCOPY WITH SCLEROTHERAPY  10/28/2015    Procedure: COLONOSCOPY WITH SCLEROTHERAPY;  Surgeon: Ruben Colon M.D.;  Location: ENDOSCOPY [facility];  Service:     COLONOSCOPY WITH TATTOOING  10/28/2015    Procedure: COLONOSCOPY WITH TATTOOING;  Surgeon: Ruben Colon M.D.;  Location: ENDOSCOPY [facility];  Service:     GYN SURGERY      hysteroscoopy    GYN SURGERY      tubal ligation     History reviewed. No pertinent family history.  Social History     Socioeconomic History    Marital status: Single     Spouse name: Not on file    Number of children: Not on file    Years of education: Not on file    Highest education level: Not on file   Occupational History    Not on file   Tobacco Use    Smoking status: Former     Types: Cigarettes     Quit date: 2016     Years since quittin.3    Smokeless tobacco: Former     Quit date: 2021    Tobacco comments:     a few a day when I can   Vaping Use    Vaping Use: Never used   Substance and Sexual Activity    Alcohol use: Not Currently    Drug use: Not Currently    Sexual activity: Not on file   Other Topics Concern    Not on file   Social History Narrative    Not on file     Social Determinants of Health     Financial Resource Strain: Low Risk  (2020)    Overall Financial Resource Strain (CARDIA)     Difficulty of Paying Living Expenses: Not hard at all   Food Insecurity: No Food Insecurity (2020)    Hunger Vital Sign     Worried About Running Out of Food in the Last Year: Never true     Ran Out of Food in the Last Year: Never true   Transportation Needs: No Transportation Needs (2020)    PRAPARE - Transportation     Lack of Transportation (Medical): No      Lack of Transportation (Non-Medical): No   Physical Activity: Not on file   Stress: Not on file   Social Connections: Not on file   Intimate Partner Violence: Not on file   Housing Stability: Not on file     Allergies   Allergen Reactions    Nkda [No Known Drug Allergy]      Current Outpatient Medications   Medication Sig Dispense Refill    metoprolol SR (TOPROL XL) 25 MG TABLET SR 24 HR Take 1 Tablet by mouth every evening. 30 Tablet 11    amoxicillin-clavulanate (AUGMENTIN) 875-125 MG Tab Take 1 Tablet by mouth 2 times a day for 104 days. 208 Tablet 0    acetaminophen (TYLENOL) 325 MG Tab Take 2 Tablets by mouth every 6 hours as needed for Mild Pain, Moderate Pain or Fever. 100 Tablet 0    senna-docusate (PERICOLACE OR SENOKOT S) 8.6-50 MG Tab Take 2 Tablets by mouth 2 times a day. 30 Tablet 0    pantoprazole (PROTONIX) 20 MG tablet Take 20 mg by mouth every day.      losartan (COZAAR) 100 MG Tab Take 100 mg by mouth every day.      Ascorbic Acid (VITAMIN C) 1000 MG Tab Take 1,000 mg by mouth every day.      vitamin e (VITAMIN E) 400 UNIT Cap Take 400 Units by mouth every day.      felodipine ER (PLENDIL) 5 MG TABLET SR 24 HR Take 5 mg by mouth every day.      REFRESH TEARS 0.5 % Solution Administer 2 Drops into both eyes as needed (dry eyes).      Cholecalciferol (VITAMIN D3) 50 MCG (2000 UT) Tab Take 2,000 Units by mouth every day.      ferrous sulfate 325 (65 Fe) MG tablet Take 325 mg by mouth every day.      magnesium oxide (MAG-OX) 400 MG Tab tablet Take 400 mg by mouth every day.      zinc sulfate (ZINCATE) 220 (50 Zn) MG Cap Take 1 Capsule by mouth every day. 30 Capsule 3    sodium bicarbonate (SODIUM BICARBONATE) 650 MG Tab Take 2 Tablets by mouth 2 times a day. 120 Tablet 3    atorvastatin (LIPITOR) 20 MG Tab Take 1 Tablet by mouth every evening. 90 Tablet 3    SPIRIVA HANDIHALER 18 MCG Cap Place 1 Capsule into inhaler and inhale every day.      potassium chloride (MICRO-K) 10 MEQ capsule Take 1 Capsule  "by mouth every day. (Patient not taking: Reported on 5/18/2023) 90 Capsule 3    furosemide (LASIX) 20 MG Tab Take 1 Tablet by mouth every day. (Patient not taking: Reported on 5/18/2023) 90 Tablet 3    ibuprofen (MOTRIN) 600 MG Tab Take 1 Tablet by mouth every 6 hours as needed for Moderate Pain. (Patient not taking: Reported on 5/18/2023) 30 Tablet 0    psyllium (METAMUCIL) 58.12 % Pack Take 1 Packet by mouth every day. (Patient not taking: Reported on 5/18/2023)      lactulose 20 GM/30ML Solution Take 15 mL by mouth every day. 450 mL     gabapentin (NEURONTIN) 400 MG Cap Take 400 mg by mouth 2 times a day. (Patient not taking: Reported on 5/18/2023)       No current facility-administered medications for this visit.       Physical Exam:  Ambulatory Vitals  /60 (BP Location: Left arm, Patient Position: Sitting, BP Cuff Size: Adult)   Pulse (!) 52   Resp 16   Ht 1.727 m (5' 8\")   Wt 86.6 kg (191 lb)   SpO2 98%    BP Readings from Last 4 Encounters:   05/18/23 102/60   05/16/23 (!) 124/90   04/23/23 131/74   04/18/23 (!) 114/90       Weight/BMI: Body mass index is 29.04 kg/m².  Wt Readings from Last 4 Encounters:   05/18/23 86.6 kg (191 lb)   05/16/23 82.3 kg (181 lb 7 oz)   04/22/23 76.1 kg (167 lb 12.3 oz)   04/18/23 78.5 kg (173 lb 1 oz)     General: No apparent distress. Well nourished. [place]  Neck: No JVD. No caroid bruits, trachea midline. R carotid artery visibly pulsating, bilateral clavicles protruding  Lungs: CTAB. Normal effort, withouyt crackles/rhonchi, no wheezing  Heart: Bradycardia.  Normal S1/S2, no murmur, no rub. no lower extremity edema. 2+ radial pulses, 2+ DT pulses  Ext: No clubbing or cyanosis.  Abdomen: soft, non tender, non distended, no mack hepatomegaly.  Neurological: No focal deficits, no facial asymmetry.  Normal speech.  Psychiatric: Appropriate affect, alert and oriented x 4.   Skin: Warm and dry, no rash.    Lab Data Review:  Lab Results   Component Value Date/Time    " CHOLSTRLTOT 82 (L) 04/19/2023 03:36 AM    LDL 30 04/19/2023 03:36 AM    HDL 45 04/19/2023 03:36 AM    TRIGLYCERIDE 37 04/19/2023 03:36 AM       Lab Results   Component Value Date/Time    SODIUM 142 05/16/2023 03:10 PM    POTASSIUM 4.9 05/16/2023 03:10 PM    CHLORIDE 113 (H) 05/16/2023 03:10 PM    CO2 21 05/16/2023 03:10 PM    GLUCOSE 107 (H) 05/16/2023 03:10 PM    BUN 13 05/16/2023 03:10 PM    CREATININE 0.67 05/16/2023 03:10 PM    CREATININE 0.9 03/12/2009 06:50 PM    BUNCREATRAT 12.0 03/08/2022 10:02 PM     Lab Results   Component Value Date/Time    ALKPHOSPHAT 277 (H) 05/16/2023 03:10 PM    ASTSGOT 44 05/16/2023 03:10 PM    ALTSGPT 31 05/16/2023 03:10 PM    TBILIRUBIN 0.9 05/16/2023 03:10 PM      Lab Results   Component Value Date/Time    WBC 4.8 05/16/2023 03:10 PM     Cardiac Imaging and Procedures Review:      EKG 7/9/2020: My Personal interpretation reveals SR 54  EKG 1/3/23 : My Personal interpretation reveals SB 52    Echo 1/29/20:  Left ventricular ejection fraction is visually estimated to be 60%.  Normal diastolic function.  Mild mitral regurgitation.  Mild tricuspid regurgitation.  Estimated right ventricular systolic pressure  is 37 mmHg.    Echo 7/14/22  Normal left ventricular systolic function.  The left ventricular ejection fraction is visually estimated to be 60%.  Moderate concentric left ventricular hypertrophy.  Mild mitral regurgitation.  Normal right ventricular size and systolic function.     Cardiac Event monitor 2/23/23  Predominant rhythm was sinus rhythm with an average heart rate of 65 bpm.   Minimal heart rate of 47 bpm and maximal heart rate of 154 bpm.   There were no sinus pauses.   There were no episodes of supraventricular tachycardia.   There was 1 episode of 2nd degree AV block (Wenckebach).   There was 1 episode of nonsustained ventricular tachycardia lasting 19 beats.   There were frequent premature supraventricular complexes (7.0%)   There were frequent premature ventricular  complexes (4.0%)   Assessment and Clinical Decision Makin. PVC (premature ventricular contraction)  metoprolol SR (TOPROL XL) 25 MG TABLET SR 24 HR      2. Type 2 diabetes mellitus with hyperosmolarity without coma, without long-term current use of insulin (HCC)        3. Pure hypercholesterolemia        4. History of hypertension        5. Paroxysmal atrial fibrillation, hx of              The following treatment plan was discussed    ? Paroxysmal atrial fibrillation  -Diagnosed by Dr. Dixon, records have been unobtainable from [facility] after last office visit  -Do not restart Multaq  -No Afib noted on 30 day Biotel. Ok to stop multaq, wean down to once daily x 1 week then stop  -Start metoprolol SR 25 mg nightly for PVCs/PACs    DM2  -Continue atorvastatin 20 mg, medium intensity statin  -Check lipid panel, did not get checked after last visit  -Hemoglobin A1c at goal of 5.4 currently  -Medications per PCP    HLD  -Recent LDL controlled at 30  -Continue on atorvastatin 20 mg    Bilateral lower extremity edema  -Chronic problem post multiple knee and leg surgeries tear    Hx HTN  -BP well controlled today at appointment  -No current antihypertensive medications      Plan reviewed in detail with the patient, verbalizes understanding and is in agreement.    Encouraged Pt to follow up with us over the phone or electronically using my MyChart as cardiac issues/concerns arise.      PLEASE NOTE: This dictation was created using voice recognition software. I have made every reasonable attempt to correct obvious errors, but I expect that there are errors of grammar and possibly content that I did not discover before finalizing the note.       EDITH Beach.   [facility]  [phone]

## 2023-05-19 ENCOUNTER — TELEPHONE (OUTPATIENT)
Dept: CARDIOLOGY | Facility: MEDICAL CENTER | Age: 63
End: 2023-05-19
Payer: MEDICAID

## 2023-06-01 ENCOUNTER — OFFICE VISIT (OUTPATIENT)
Dept: INFECTIOUS DISEASES | Facility: MEDICAL CENTER | Age: 63
End: 2023-06-01
Attending: NURSE PRACTITIONER
Payer: MEDICAID

## 2023-06-01 VITALS
TEMPERATURE: 98.9 F | DIASTOLIC BLOOD PRESSURE: 80 MMHG | OXYGEN SATURATION: 92 % | HEIGHT: 68 IN | SYSTOLIC BLOOD PRESSURE: 122 MMHG | RESPIRATION RATE: 16 BRPM | HEART RATE: 59 BPM | WEIGHT: 181.22 LBS | BODY MASS INDEX: 27.47 KG/M2

## 2023-06-01 DIAGNOSIS — Z96.659 INFECTION OF PROSTHETIC KNEE JOINT, SUBSEQUENT ENCOUNTER: ICD-10-CM

## 2023-06-01 DIAGNOSIS — D63.8 ANEMIA OF CHRONIC DISEASE: ICD-10-CM

## 2023-06-01 DIAGNOSIS — E11.9 TYPE 2 DIABETES MELLITUS WITHOUT COMPLICATION, WITHOUT LONG-TERM CURRENT USE OF INSULIN (HCC): ICD-10-CM

## 2023-06-01 DIAGNOSIS — M00.262 STREPTOCOCCAL ARTHRITIS OF LEFT KNEE (HCC): ICD-10-CM

## 2023-06-01 DIAGNOSIS — F19.91 HISTORY OF ILLICIT DRUG USE: ICD-10-CM

## 2023-06-01 DIAGNOSIS — T84.59XD INFECTION OF PROSTHETIC KNEE JOINT, SUBSEQUENT ENCOUNTER: ICD-10-CM

## 2023-06-01 DIAGNOSIS — B95.1 STREPTOCOCCUS, GROUP B, AS THE CAUSE OF DISEASES CLASSIFIED ELSEWHERE: ICD-10-CM

## 2023-06-01 PROCEDURE — 3074F SYST BP LT 130 MM HG: CPT | Performed by: NURSE PRACTITIONER

## 2023-06-01 PROCEDURE — 99212 OFFICE O/P EST SF 10 MIN: CPT | Performed by: NURSE PRACTITIONER

## 2023-06-01 PROCEDURE — 99213 OFFICE O/P EST LOW 20 MIN: CPT | Performed by: NURSE PRACTITIONER

## 2023-06-01 PROCEDURE — 3079F DIAST BP 80-89 MM HG: CPT | Performed by: NURSE PRACTITIONER

## 2023-06-01 RX ORDER — AMOXICILLIN AND CLAVULANATE POTASSIUM 875; 125 MG/1; MG/1
1 TABLET, FILM COATED ORAL 2 TIMES DAILY
Qty: 420 TABLET | Refills: 0 | Status: SHIPPED | OUTPATIENT
Start: 2023-06-01 | End: 2023-10-05 | Stop reason: SDUPTHER

## 2023-06-01 ASSESSMENT — ENCOUNTER SYMPTOMS
HEADACHES: 0
ABDOMINAL PAIN: 0
SPUTUM PRODUCTION: 0
VOMITING: 0
NERVOUS/ANXIOUS: 0
WEAKNESS: 0
PALPITATIONS: 0
FOCAL WEAKNESS: 0
MYALGIAS: 0
BLURRED VISION: 0
DOUBLE VISION: 0
COUGH: 0
SHORTNESS OF BREATH: 0
FEVER: 0
NAUSEA: 0
BRUISES/BLEEDS EASILY: 0
WHEEZING: 0
DIZZINESS: 0
WEIGHT LOSS: 0
CHILLS: 0

## 2023-06-01 ASSESSMENT — FIBROSIS 4 INDEX: FIB4 SCORE: 5.19

## 2023-06-01 NOTE — PROGRESS NOTES
INFECTIOUS  DISEASE  OUTPATIENT CLINIC  NOTE   Subjective   Primary care provider:   Anum Gatica M.D.      Reason for Follow Up:   Follow-up for   1. Infection of prosthetic knee joint, subsequent encounter  amoxicillin-clavulanate (AUGMENTIN) 875-125 MG Tab    CBC WITH DIFFERENTIAL    Comp Metabolic Panel      2. Streptococcal arthritis of left knee (HCC)        3. Streptococcus, group b, as the cause of diseases classified elsewhere        4. Type 2 diabetes mellitus without complication, without long-term current use of insulin (HCC)        5. History of illicit drug use        6. Anemia of chronic disease            HPI: Patient previously seen and treated by ID team as inpatient during hospital admission.   April Alicia is a 62 y.o. female with a history of alcohol and cocaine abuse, cirrhosis, asthma, A-fib on apixaban, 2 diabetes, hypertension. Patient was admitted on July 2022 with a group B strep bacteremia and left knee prosthetic joint infection along with a left shoulder septic arthritis and some subdeltoid abscess.  Plan was for her to discharge to a skilled nursing facility and continue IV ceftriaxone for 6 weeks until 8/30/2022 due to some unknown reason patient only received 4 weeks of antibiotics.  Patient was readmitted on 12/26/2022 due to recurrent left knee pain and swelling.  She was taken back to the OR on 12/28/22 underwent explantation and placement of articulating spacer, wound VAC.  Multiple OR cultures again growing group B Strep.   Underwent synovial fluid removal from the left shoulder on 1/2/2022 WBC 1051, 40% polys, 40% lymphs, not consistent with infection.  Plan at discharge with 6 weeks of IV antibiotics ceftriaxone 2 g daily till 2/7/2023    2/15-Orthopedic MD Garcia note recommending patient continue oral antibiotics for at least 1 year prior to reimplantation as she is undergone 2 I&D's in the past with 2 antibiotic spacers which were both failed.  She is at high  risk for failing stage II reimplantation and will be subject to amputation if failed again.  We will continue with oral Augmentin 500/ 125 mg twice daily for an additional year    02/16/23- Today Patient reports feeling well. Pt stating that the wound is healing well. Pt being followed by the [facility]. Wound pictures reviewed in EPIC. Denies drainage, pungent odor, redness, pain. Denies feeling generally ill, fevers/chills, general malaise, headache, n/v/d.     3/15/23- Today Patient reports feeling  good. Denies feeling generally ill, fevers/chills, general malaise, headache, n/v/d.  Patient states that she has not taken her Augmentin for the last 3 days as the care facility she was discharged from did not give her a supply.  Medication reordered and to be picked up at her pharmacy.  Tolerating the Augmentin with no complaints of side effects.     4/18/23-patient comes in today with complaints of not feeling well. She reports increasing fatigue, trouble staying awake, and decreasing appetite. Her symptoms started about 2 weeks ago when started to notice increased swelling of her left knee with warmth despite treatment with chronic dosing of Augmentin.  I am concerned that she may have possibly failed outpatient treatment and needs additional services to rule out worsening infection.  Patient escorted to ER    5/16/23- Previously admitted into the ER for further evaluation. Repeat CT  4/19 Previous left total knee replacement and fusion. No evidence of periprosthetic fracture or loosening. No significant joint effusion or soft tissue gas. 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  Plan to transition back to p.o. Augmentin 875/125 mg twice daily due to increased bioavailability.  Most recent labs reviewed from 4/22/2023, chronic leukopenia 3.8 stable, chronic anemia, hypoalbuminemia 2.5.  Patient continues to have complaints  of chronic fatigue, left knee warmth, and bouts of nausea.  HIV screening, repeat CBC/CMP.  Recommended close follow-up with PCP for further evaluation of chronic fatigue and leukopenia.  Previous HIV screening negative 2021 6/1/23- repeat HIV screening negative.  Previous labs from 5/16/2023 reviewed, WBC WNL 4.8, thrombocytopenia stable 96, elevated alk phos level 277 stable compared to previous labs and consistent with history of alcohol cirrhosis.  Continues to tolerate p.o. Augmentin without any complaints of side effects.  Left knee with continued swelling but no erythema, drainage or worsening pain.  Denies any recent fevers, chills, nausea, vomiting, constipation or diarrhea but does complain of chronic fatigue.  Continues to follow-up with cardiology, no longer in A-fib and weaning off Multaq and recently started on metoprolol for PVCs/PACs.        Current Antimicrobials: Augmentin 875/125 mg twice daily, stop date 12/28/23  Previous Antimicrobials: Unasyn, ceftriaxone, Ancef, cefazolin, Augmentin    Other Current Medications:  Home Medications    Medication Sig Taking? Last Dose Authorizing Provider   amoxicillin-clavulanate (AUGMENTIN) 875-125 MG Tab Take 1 Tablet by mouth 2 times a day for 210 days. Yes  Mateus Shell A.P.R.N.   metoprolol SR (TOPROL XL) 25 MG TABLET SR 24 HR Take 1 Tablet by mouth every evening. Yes Taking Kirstie Whipple, A.P.R.N.   potassium chloride (MICRO-K) 10 MEQ capsule Take 1 Capsule by mouth every day. Yes Taking Kirstie Whipple, A.P.R.N.   furosemide (LASIX) 20 MG Tab Take 1 Tablet by mouth every day. Yes Taking Kirstie Whipple, A.P.R.N.   acetaminophen (TYLENOL) 325 MG Tab Take 2 Tablets by mouth every 6 hours as needed for Mild Pain, Moderate Pain or Fever. Yes Taking Poonam Patel D.O.   ibuprofen (MOTRIN) 600 MG Tab Take 1 Tablet by mouth every 6 hours as needed for Moderate Pain. Yes Taking Poonam Patel D.O.   senna-docusate (PERICOLACE OR SENOKOT S) 8.6-50 MG Tab Take  2 Tablets by mouth 2 times a day. Yes Taking Poonam Patel D.O.   pantoprazole (PROTONIX) 20 MG tablet Take 20 mg by mouth every day. Yes Taking Physician Outpatient   losartan (COZAAR) 100 MG Tab Take 100 mg by mouth every day. Yes Taking Physician Outpatient   Ascorbic Acid (VITAMIN C) 1000 MG Tab Take 1,000 mg by mouth every day. Yes Taking Physician Outpatient   vitamin e (VITAMIN E) 400 UNIT Cap Take 400 Units by mouth every day. Yes Taking Physician Outpatient   felodipine ER (PLENDIL) 5 MG TABLET SR 24 HR Take 5 mg by mouth every day. Yes Taking Physician Outpatient   psyllium (METAMUCIL) 58.12 % Pack Take 1 Packet by mouth every day. Yes Taking Physician Outpatient   REFRESH TEARS 0.5 % Solution Administer 2 Drops into both eyes as needed (dry eyes). Yes Taking Physician Outpatient   Cholecalciferol (VITAMIN D3) 50 MCG (2000 UT) Tab Take 2,000 Units by mouth every day. Yes Taking Physician Outpatient   ferrous sulfate 325 (65 Fe) MG tablet Take 325 mg by mouth every day. Yes Taking Physician Outpatient   magnesium oxide (MAG-OX) 400 MG Tab tablet Take 400 mg by mouth every day. Yes Taking Physician Outpatient   zinc sulfate (ZINCATE) 220 (50 Zn) MG Cap Take 1 Capsule by mouth every day. Yes Taking Rhys Martínez D.O.   sodium bicarbonate (SODIUM BICARBONATE) 650 MG Tab Take 2 Tablets by mouth 2 times a day. Yes Taking Rhys Martínez D.O.   lactulose 20 GM/30ML Solution Take 15 mL by mouth every day. Yes Taking Rhys Martínez D.O.   atorvastatin (LIPITOR) 20 MG Tab Take 1 Tablet by mouth every evening. Yes Taking Rhys Martínez D.O.   SPIRIVA HANDIHALER 18 MCG Cap Place 1 Capsule into inhaler and inhale every day. Yes Taking Physician Outpatient   gabapentin (NEURONTIN) 400 MG Cap Take 400 mg by mouth 2 times a day. Yes Taking Physician Outpatient        PMH:  Past Medical History:   Diagnosis Date    Arrhythmia     Arthritis     OA in knees    ASTHMA     Blood clotting disorder (HCC) 2018    right leg  blood clot    Dental disorder     upper and lower dentures    Diabetes     Emphysema of lung (HCC)     Heart abnormality     leakage in left valve    Heart burn     left knee    Heart valve disease     Hypertension     Infection 9/4/2017    Infection 2018    Right knee after total knee    Liver disease     Pneumonia 02/2020    Seizure disorder (HCC)      Past Surgical History:   Procedure Laterality Date    PB REVISE KNEE JOINT REPLACE,ALL PARTS Left 12/28/2022    Procedure: REVISION, TOTAL ARTHROPLASTY, KNEE, ALL COMPONENTS-LEFT;  Surgeon: Wild Luz M.D.;  Location: SURGERY [facility];  Service: Orthopedics    IRRIGATION & DEBRIDEMENT GENERAL Left 12/28/2022    Procedure: IRRIGATION AND DEBRIDEMENT, WOUND;  Surgeon: Wild Luz M.D.;  Location: [facility];  Service: Orthopedics    PB REVISE KNEE JOINT REPLACE,ALL PARTS Left 7/19/2022    Procedure: REVISION, TOTAL ARTHROPLASTY, KNEE, ALL COMPONENTS - ANTIBIOTIC SPACER;  Surgeon: Wild Luz M.D.;  Location: [facility];  Service: Orthopedics    IRRIGATION & DEBRIDEMENT GENERAL Left 7/17/2022    Procedure: IRRIGATION AND DEBRIDEMENT, SHOULDER;  Surgeon: Obdulio Gray M.D.;  Location: [facility];  Service: Orthopedics    PB TOTAL KNEE ARTHROPLASTY Left 8/24/2020    Procedure: ARTHROPLASTY, KNEE, TOTAL;  Surgeon: Víctor Faustin M.D.;  Location: [facility];  Service: Orthopedics    KNEE ARTHROPLASTY TOTAL Right 2018    IRRIGATION & DEBRIDEMENT ORTHO Right 9/3/2017    Procedure: IRRIGATION & DEBRIDEMENT ORTHO, POLY EXCHANGE;  Surgeon: Jerome Russell M.D.;  Location: SURGERY [facility];  Service: Orthopedics    COLONOSCOPY WITH CLIPPING  10/28/2015    Procedure: COLONOSCOPY WITH CLIPPING;  Surgeon: Ruben Colon M.D.;  Location: ENDOSCOPY [facility];  Service:     COLONOSCOPY WITH SCLEROTHERAPY  10/28/2015    Procedure: COLONOSCOPY WITH SCLEROTHERAPY;  Surgeon: Ruben Colon M.D.;   Location: ENDOSCOPY [facility];  Service:     COLONOSCOPY WITH TATTOOING  10/28/2015    Procedure: COLONOSCOPY WITH TATTOOING;  Surgeon: Ruben Colon M.D.;  Location: ENDOSCOPY [facility];  Service:     GYN SURGERY      hysteroscoopy    GYN SURGERY      tubal ligation     No family history on file.  Social History     Socioeconomic History    Marital status: Single     Spouse name: Not on file    Number of children: Not on file    Years of education: Not on file    Highest education level: Not on file   Occupational History    Not on file   Tobacco Use    Smoking status: Former     Types: Cigarettes     Quit date: 2016     Years since quittin.4    Smokeless tobacco: Former     Quit date: 2021    Tobacco comments:     a few a day when I can   Vaping Use    Vaping Use: Never used   Substance and Sexual Activity    Alcohol use: Not Currently    Drug use: Not Currently    Sexual activity: Not on file   Other Topics Concern    Not on file   Social History Narrative    Not on file     Social Determinants of Health     Financial Resource Strain: Low Risk  (2020)    Overall Financial Resource Strain (CARDIA)     Difficulty of Paying Living Expenses: Not hard at all   Food Insecurity: No Food Insecurity (2020)    Hunger Vital Sign     Worried About Running Out of Food in the Last Year: Never true     Ran Out of Food in the Last Year: Never true   Transportation Needs: No Transportation Needs (2020)    PRAPARE - Transportation     Lack of Transportation (Medical): No     Lack of Transportation (Non-Medical): No   Physical Activity: Not on file   Stress: Not on file   Social Connections: Not on file   Intimate Partner Violence: Not on file   Housing Stability: Not on file           Allergies/Intolerances:  Allergies   Allergen Reactions    Nkda [No Known Drug Allergy]        ROS:   Review of Systems   Constitutional:  Positive for malaise/fatigue. Negative for chills, fever and  "weight loss.   HENT:  Negative for congestion and hearing loss.    Eyes:  Negative for blurred vision and double vision.   Respiratory:  Negative for cough, sputum production, shortness of breath and wheezing.    Cardiovascular:  Negative for chest pain and palpitations.   Gastrointestinal:  Negative for abdominal pain, nausea and vomiting.   Genitourinary:  Negative for dysuria.   Musculoskeletal:  Positive for joint pain (Chronic left shoulder pain). Negative for myalgias.   Skin:  Negative for itching and rash.   Neurological:  Negative for dizziness, focal weakness, weakness and headaches.   Endo/Heme/Allergies:  Does not bruise/bleed easily.   Psychiatric/Behavioral:  The patient is not nervous/anxious.       ROS was reviewed and were negative except as above.    Objective    Most Recent Vital Signs:  Blood Pressure 122/80 (BP Location: Right arm, Patient Position: Sitting, BP Cuff Size: Adult)   Pulse (Abnormal) 59   Temperature 37.2 °C (98.9 °F) (Temporal)   Respiration 16   Height 1.727 m (5' 8\")   Weight 82.2 kg (181 lb 3.5 oz)   Last Menstrual Period 06/02/2008   Oxygen Saturation 92%   Body Mass Index 27.55 kg/m²     Physical Exam:  Physical Exam  Vitals and nursing note reviewed.   Constitutional:       General: She is not in acute distress.     Appearance: Normal appearance.      Comments: Walker   HENT:      Head: Normocephalic and atraumatic.      Nose: Nose normal.      Mouth/Throat:      Mouth: Mucous membranes are moist.   Eyes:      Pupils: Pupils are equal, round, and reactive to light.   Cardiovascular:      Rate and Rhythm: Normal rate and regular rhythm.   Pulmonary:      Effort: Pulmonary effort is normal. No respiratory distress.      Breath sounds: Normal breath sounds. No stridor.   Abdominal:      General: There is no distension.      Palpations: Abdomen is soft.   Musculoskeletal:         General: Tenderness present. No swelling.      Cervical back: Normal range of motion.      " Comments: Left knee surgical scar, no erythema, warmth on palpation, no swelling or open wounds   Skin:     General: Skin is warm and dry.      Coloration: Skin is not jaundiced or pale.   Neurological:      General: No focal deficit present.      Mental Status: She is oriented to person, place, and time.      Cranial Nerves: No cranial nerve deficit.      Sensory: No sensory deficit.   Psychiatric:         Mood and Affect: Mood normal.         Behavior: Behavior normal.          Pertinent Lab/Imaging Results:  [unfilled]  @CMP@  WBC   Date/Time Value Ref Range Status   05/16/2023 03:10 PM 4.8 4.8 - 10.8 K/uL Final     RBC   Date/Time Value Ref Range Status   05/16/2023 03:10 PM 4.06 (L) 4.20 - 5.40 M/uL Final     Hemoglobin   Date/Time Value Ref Range Status   05/16/2023 03:10 PM 11.7 (L) 12.0 - 16.0 g/dL Final     Hematocrit   Date/Time Value Ref Range Status   05/16/2023 03:10 PM 36.3 (L) 37.0 - 47.0 % Final     MCV   Date/Time Value Ref Range Status   05/16/2023 03:10 PM 89.4 81.4 - 97.8 fL Final     MCH   Date/Time Value Ref Range Status   05/16/2023 03:10 PM 28.8 27.0 - 33.0 pg Final     MCHC   Date/Time Value Ref Range Status   05/16/2023 03:10 PM 32.2 (L) 33.6 - 35.0 g/dL Final     MPV   Date/Time Value Ref Range Status   05/16/2023 03:10 PM 10.0 9.0 - 12.9 fL Final      Sodium   Date/Time Value Ref Range Status   05/16/2023 03:10  135 - 145 mmol/L Final     Potassium   Date/Time Value Ref Range Status   05/16/2023 03:10 PM 4.9 3.6 - 5.5 mmol/L Final     Chloride   Date/Time Value Ref Range Status   05/16/2023 03:10  (H) 96 - 112 mmol/L Final     Co2   Date/Time Value Ref Range Status   05/16/2023 03:10 PM 21 20 - 33 mmol/L Final     Glucose   Date/Time Value Ref Range Status   05/16/2023 03:10  (H) 65 - 99 mg/dL Final     Bun   Date/Time Value Ref Range Status   05/16/2023 03:10 PM 13 8 - 22 mg/dL Final     Creatinine   Date/Time Value Ref Range Status   05/16/2023 03:10 PM 0.67 0.50 - 1.40  mg/dL Final   03/12/2009 06:50 PM 0.9 0.5 - 1.4 mg/dL Final     Bun-Creatinine Ratio   Date/Time Value Ref Range Status   03/08/2022 10:02 PM 12.0  Final     Alkaline Phosphatase   Date/Time Value Ref Range Status   05/16/2023 03:10  (H) 30 - 99 U/L Final     AST(SGOT)   Date/Time Value Ref Range Status   05/16/2023 03:10 PM 44 12 - 45 U/L Final     ALT(SGPT)   Date/Time Value Ref Range Status   05/16/2023 03:10 PM 31 2 - 50 U/L Final     Total Bilirubin   Date/Time Value Ref Range Status   05/16/2023 03:10 PM 0.9 0.1 - 1.5 mg/dL Final      CPK Total   Date/Time Value Ref Range Status   08/04/2022 03:17  (H) 0 - 154 U/L Final          Blood Culture   Date Value Ref Range Status   09/04/2017 No growth after 5 days of incubation.  Final     Blood Culture Hold   Date Value Ref Range Status   10/03/2020 Collected  Final       Blood Culture   Date Value Ref Range Status   09/04/2017 No growth after 5 days of incubation.  Final     Blood Culture Hold   Date Value Ref Range Status   10/03/2020 Collected  Final    Micro:  Results         Procedure Component Value Units Date/Time     COV-2, FLU A/B, AND RSV BY PCR (2-4 HOURS CEPHEID): Collect NP swab in VTM [[id number]] Collected: 01/05/23 1531     Order Status: Completed Specimen: Respirate from Nasopharyngeal Updated: 01/05/23 1629       Influenza virus A RNA Negative       Influenza virus B, PCR Negative       RSV, PCR Negative       SARS-CoV-2 by PCR NotDetected       Comment: [facility] providers: PLEASE REFER TO DE-ESCALATION AND RETESTING PROTOCOL  on [email or web address]     **The [employer] GeneXpert Xpress SARS-CoV-2 RT-PCR Test has been made  available for use under the Emergency Use Authorization (EUA) only.             SARS-CoV-2 Source NP Swab     Narrative:       Collected By: [id number] JUAN CARLOS RAMOS     URINALYSIS [[id number]]  (Abnormal) Collected: 01/05/23 1515     Order Status: Completed Specimen: Urine, Clean Catch Updated: 01/05/23 1603       Color  "Yellow       Character Clear       Specific Gravity 1.014       Ph 6.5       Glucose Negative mg/dL         Ketones Negative mg/dL         Protein Negative mg/dL         Bilirubin Negative       Urobilinogen, Urine 0.2       Nitrite Negative       Leukocyte Esterase Negative       Occult Blood Small       Micro Urine Req Microscopic     Narrative:       Collected By: [id number] JUAN CARLOS RAMOS     FLUID CULTURE W/GRAM STAIN [[id number]] Collected: 01/02/23 1545     Order Status: Completed Specimen: Synovial Updated: 01/05/23 0945       Significant Indicator NEG       Source SYNO       Site Left Shoulder       Culture Result No growth at 72 hours.       Gram Stain Result Rare WBCs.  No organisms seen.        BLOOD CULTURE [[id number]] Collected: 01/03/23 0842     Order Status: Completed Specimen: Blood from Peripheral Updated: 01/04/23 0728       Significant Indicator NEG       Source BLD       Site PERIPHERAL       Culture Result No Growth  Note: Blood cultures are incubated for 5 days and  are monitored continuously.Positive blood cultures  are called to the RN and reported as soon as  they are identified.        Narrative:       Per Hospital Policy: Only change Specimen Src: to \"Line\" if  specified by physician order.  Right Hand     BLOOD CULTURE [[id number]] Collected: 01/03/23 0842     Order Status: Completed Specimen: Blood from Peripheral Updated: 01/04/23 0728       Significant Indicator NEG       Source BLD       Site PERIPHERAL       Culture Result No Growth  Note: Blood cultures are incubated for 5 days and  are monitored continuously.Positive blood cultures  are called to the RN and reported as soon as  they are identified.        Narrative:       Per Hospital Policy: Only change Specimen Src: to \"Line\" if  specified by physician order.  Left Forearm/Arm     URINALYSIS [[id number]]  (Abnormal) Collected: 01/03/23 1952     Order Status: Completed Specimen: Urine, Cath Updated: 01/03/23 2013       Color " "Yellow       Character Clear       Specific Gravity 1.020       Ph 5.5       Glucose Negative mg/dL         Ketones Negative mg/dL         Protein Negative mg/dL         Bilirubin Negative       Urobilinogen, Urine 0.2       Nitrite Negative       Leukocyte Esterase Negative       Occult Blood Small       Micro Urine Req Microscopic     Narrative:       Collected By: [id number] CECI BRANDT     GRAM STAIN [[id number]] Collected: 01/02/23 1545     Order Status: Completed Specimen: Synovial Updated: 01/02/23 1836       Significant Indicator .       Source SYNO       Site Left Shoulder       Gram Stain Result Rare WBCs.  No organisms seen.        FLUID CULTURE W/GRAM STAIN [[id number]]       Order Status: No result Specimen: Body Fluid from Synovial Fluid       Blood Culture [[id number]] Collected: 12/26/22 2202     Order Status: Completed Specimen: Blood from Peripheral Updated: 12/31/22 2300       Significant Indicator NEG       Source BLD       Site PERIPHERAL       Culture Result No growth after 5 days of incubation.     Narrative:       1 of 2 for Blood Culture x 2 sites order. Per Hospital  Policy: Only change Specimen Src: to \"Line\" if specified by  physician order.  No site indicated     Blood Culture [[id number]] Collected: 12/26/22 2225     Order Status: Completed Specimen: Blood from Peripheral Updated: 12/31/22 2300       Significant Indicator NEG       Source BLD       Site PERIPHERAL       Culture Result No growth after 5 days of incubation.     Narrative:       2 of 2 blood culture x2  Sites order. Per Hospital Policy:  Only change Specimen Src: to \"Line\" if specified by physician  order.  Left Hand     CULTURE TISSUE W/ GRM STAIN [[id number]]  (Abnormal) Collected: 12/28/22 1351     Order Status: Completed Specimen: Tissue Updated: 12/31/22 1205       Significant Indicator POS       Source TISS       Site Left anterior tibia       Culture Result -       Gram Stain Result Rare WBCs.  No organisms seen.          " Culture Result Streptococcus agalactiae (Group B)  Light growth       Narrative:       Surgery Specimen     Anaerobic Culture [[id number]] Collected: 12/28/22 1351     Order Status: Completed Specimen: Tissue Updated: 12/31/22 1205       Significant Indicator NEG       Source TISS       Site Left anterior tibia       Culture Result No Anaerobes isolated.     Narrative:       Surgery Specimen     CULTURE TISSUE W/ GRM STAIN [[id number]]  (Abnormal) Collected: 12/28/22 1350     Order Status: Completed Specimen: Tissue Updated: 12/31/22 1201       Significant Indicator POS       Source TISS       Site Left knee lateral gutter       Culture Result -       Gram Stain Result Many WBCs.  No organisms seen.          Culture Result Streptococcus agalactiae (Group B)  Light growth       Narrative:       Surgery Specimen     Anaerobic Culture [[id number]] Collected: 12/28/22 1350     Order Status: Completed Specimen: Tissue Updated: 12/31/22 1201       Significant Indicator NEG       Source TISS       Site Left knee lateral gutter       Culture Result No Anaerobes isolated.     Narrative:       Surgery Specimen     CULTURE TISSUE W/ GRM STAIN [[id number]]  (Abnormal) Collected: 12/28/22 1432     Order Status: Completed Specimen: Tissue Updated: 12/31/22 1200       Significant Indicator POS       Source TISS       Site Left femur       Culture Result -       Gram Stain Result Rare WBCs.  No organisms seen.          Culture Result Streptococcus agalactiae (Group B)  Light growth       Narrative:       Surgery Specimen     Anaerobic Culture [[id number]] Collected: 12/28/22 1432     Order Status: Completed Specimen: Tissue Updated: 12/31/22 1200       Significant Indicator NEG       Source TISS       Site Left femur       Culture Result No Anaerobes isolated.     Narrative:       Surgery Specimen     CULTURE TISSUE W/ GRM STAIN [[id number]]  (Abnormal) Collected: 12/28/22 1433     Order Status: Completed Specimen: Tissue Updated:  12/31/22 1159       Significant Indicator POS       Source TISS       Site Left Tibia       Culture Result -       Gram Stain Result Many WBCs.  No organisms seen.          Culture Result Streptococcus agalactiae (Group B)  Light growth       Narrative:       Surgery Specimen     Anaerobic Culture [[id number]] Collected: 12/28/22 1433     Order Status: Completed Specimen: Tissue Updated: 12/31/22 1159       Significant Indicator NEG       Source TISS       Site Left Tibia       Culture Result No Anaerobes isolated.     Narrative:       Surgery Specimen     CULTURE TISSUE W/ GRM STAIN [[id number]]  (Abnormal) Collected: 12/28/22 1349     Order Status: Completed Specimen: Tissue Updated: 12/31/22 1159       Significant Indicator POS       Source TISS       Site Left Knee Medial Gutter       Culture Result -       Gram Stain Result Rare WBCs.  No organisms seen.          Culture Result Streptococcus agalactiae (Group B)  Light growth       Narrative:       Surgery Specimen     Anaerobic Culture [[id number]] Collected: 12/28/22 1349     Order Status: Completed Specimen: Tissue Updated: 12/31/22 1159       Significant Indicator NEG       Source TISS       Site Left Knee Medial Gutter       Culture Result No Anaerobes isolated.     Narrative:       Surgery Specimen     FLUID CULTURE W/GRAM STAIN  Order: [id number]  Status: Final result     Visible to patient: No (inaccessible in MyChart)     Next appt: Today at 11:00 AM in Infectious Diseases (Mateus Shell A.P.R.N.)     Specimen Information: Synovial Fluid   0 Result Notes      Component 1 mo ago   Significant Indicator POS Positive (POS)    Source SYNO    Site Left Knee    Culture Result - Abnormal     Gram Stain Result  Abnormal   Many WBCs.   Rare Gram positive cocci.     Culture Result  Abnormal   Streptococcus agalactiae (Group B)   Light growth   This isolate does NOT demonstrate inducible Clindamycin   resistance in vitro.     Resulting Agency M         Susceptibility     Streptococcus agalactiae (group b)     [facility]     Clindamycin <=0.25 mcg/mL Sensitive     Daptomycin <=0.5 mcg/mL Sensitive     Penicillin <=0.03 mcg/mL Sensitive                       Impression/Assessment      1. Infection of prosthetic knee joint, subsequent encounter  amoxicillin-clavulanate (AUGMENTIN) 875-125 MG Tab    CBC WITH DIFFERENTIAL    Comp Metabolic Panel      2. Streptococcal arthritis of left knee (HCC)        3. Streptococcus, group b, as the cause of diseases classified elsewhere        4. Type 2 diabetes mellitus without complication, without long-term current use of insulin (HCC)        5. History of illicit drug use        6. Anemia of chronic disease          April Alicia is a 62 y.o. female with a history of alcohol and cocaine abuse, cirrhosis, asthma, A-fib on apixaban, 2 diabetes, hypertension. Patient was admitted on July 2022 with a group B strep bacteremia and left knee prosthetic joint infection along with a left shoulder septic arthritis and some subdeltoid abscess.  Plan was for her to discharge to a skilled nursing facility and continue IV ceftriaxone for 6 weeks until 8/30/2022 due to some unknown reason patient only received 4 weeks of antibiotics.  Patient was readmitted on 12/26/2022 due to recurrent left knee pain and swelling.  She was taken back to the OR on 12/28 underwent explantation and placement of articulating  spacer, wound VAC.  Multiple OR cultures again growing group B Strep.   Underwent synovial fluid removal from the left shoulder on 1/2/2022 WBC 1051, 40% polys, 40% lymphs, not consistent with infection.  Streptococcus group B (penicillin sensitive). Plan at discharge with 6 weeks of IV antibiotics ceftriaxone 2 g daily till 2/7/2023. Orthopedic MD Garcia note recommending patient continue oral antibiotics for at least 1 year prior to reimplantation as she is undergone 2 I&D's in the past with 2 antibiotic spacers which were both  failed.  She is at high risk for failing stage II reimplantation and will be subject to amputation if failed again.      Continue p.o. Augmentin 875/125 mg twice daily for at least 1 year from date of last surgery (end date 12/28/2023).  Monthly monitoring. Continues to follow-up with cardiology, no longer in A-fib and weaning off Multaq and recently started on metoprolol for PVCs/PACs.     PLAN:   -Continue Augmentin 875/125 mg twice daily, end date 12/28/2023.  Medication education provided and side effects to monitor. Medication refilled for 3 months  -Repeat CBC/CMP ordered for continuous monitoring in 1 month. Previous labs from 5/16/2023 reviewed, WBC WNL 4.8, thrombocytopenia stable 96, elevated alk phos level 277 stable compared to previous labs and consistent with history of alcohol cirrhosis   -Previous HIV screening negative  -Recommend  follow-up with PCP for eval for chronic fatigue and leukopenia for additional testing, not related to antibiotic therapy as evident by past labs   -Continue routine follow-up with orthopedic surgery and cardiology  -Recommend to keep blood sugars below 140 to allow for adequate wound healing and prevent secondary infection.  Continue on atorvastatin 20 mg.  Diabetes managed through diet.  Last A1c 5.4 from 1/1/2023  -Education provided on worsening signs and symptoms of infection and when to report to ER/call 911      Return visit: 1 month. follow up with primary care physician for chronic medical problems      I have performed a physical exam,  updated ROS and plan today. I have reviewed previous images, labs, and provider notes.      EDITH Baez.    All Patients should seek medical re-evaluation or report to the ER for new, increasing or worsening symptoms. In some circumstances medical conditions can change from the initial evaluation and may require emergent medical re-evaluation. This includes but is not limited to chest pain, shortness of breath, atypical  abdominal pain, atypical headache, ALOC, fever >101, low blood pressure, high respiratory rate (above 30), low oxygen saturation (below 90%), acute delirium, abnormal bleeding, inability to tolerate any intake, weakness on one side of the body, any worsened or concerning conditions.    Please note that this dictation was created using voice recognition software. I have worked with technical experts from [employer] to optimize the interface.  I have made every reasonable attempt to correct obvious errors, but there may be errors of grammar and possibly content that I did not discover before finalizing the note.

## 2023-06-23 ENCOUNTER — PHYSICAL THERAPY (OUTPATIENT)
Dept: PHYSICAL THERAPY | Facility: REHABILITATION | Age: 63
End: 2023-06-23
Attending: INTERNAL MEDICINE
Payer: MEDICAID

## 2023-06-23 DIAGNOSIS — G89.4 CHRONIC PAIN SYNDROME: ICD-10-CM

## 2023-06-23 PROCEDURE — 97162 PT EVAL MOD COMPLEX 30 MIN: CPT

## 2023-06-23 ASSESSMENT — ENCOUNTER SYMPTOMS
PAIN SCALE AT LOWEST: 4
PAIN SCALE: 8
PAIN LOCATION: L KNEE
PAIN SCALE AT HIGHEST: 8

## 2023-06-23 NOTE — OP THERAPY EVALUATION
"  Outpatient Physical Therapy  INITIAL EVALUATION    [facility] [place] [address]  [address]  [place] [address]  Phone:  [phone]  Fax:  [phone]    Date of Evaluation: 2023    Patient: April Alicia  YOB: 1960  MRN: [id number]     Referring Provider: Anum Gatica M.D.  [address] [place] [address]   Referring Diagnosis Chronic pain syndrome [G89.4]     Time Calculation                 Chief Complaint: No chief complaint on file.    Visit Diagnoses     ICD-10-CM   1. Chronic pain syndrome  G89.4       Date of onset of impairment: No data found    Subjective   History of Present Illness:     History of chief complaint:  Pt. is a 63 Y.O. F who reports that her L knee seems to be getting worse and she cannot bend her knee  Recent hospital admission 23 Epic MD note reads:  \"63 y.o. female with PMH of alcohol, cocaine abuse (currently does not use), cirrhosis, portal hypertension, atrial fibrillation on apixaban, type 2 diabetes mellitus, hypertension, and a complex left knee prosthetic joint infection who presented 2023 that presented to the ED for suspicion for failing outpatient antibiotic treatment. \"  Pt. To stay on antibiotics for 1 year and go to PT. Pt. Reports that she was referred to a bone specialist, but she could not get a visit for 1.5 years. So she gave up.  She reports being 'tired' of the hospital admissions.    PLOF: walked w/out limits (3 years ago)  PMHx: significant for L TKR 2023, 3 Yr h/o knee pain; skin grafts and wound care.  L SH limited ROM, head injury/Coma        Pain:     Current pain ratin    At best pain ratin    At worst pain ratin    Location:  L Knee    Pain comments:  PAIN  Location: 1. L knee , refers to: ant knee  2. L SH   Descriptors: 1.Throbbing and st ing 2. Pain and reduced ROM   Aggravated with movement and WB, settles with Aleve, lasting after settles 30 " minutes  Progression: same         Past Medical History:   Diagnosis Date    Arrhythmia     Arthritis     OA in knees    ASTHMA     Blood clotting disorder (HCC) 2018    right leg blood clot    Dental disorder     upper and lower dentures    Diabetes     Emphysema of lung (HCC)     Heart abnormality     leakage in left valve    Heart burn     left knee    Heart valve disease     Hypertension     Infection 9/4/2017    Infection 2018    Right knee after total knee    Liver disease     Pneumonia 02/2020    Seizure disorder (HCC)      Past Surgical History:   Procedure Laterality Date    PB REVISE KNEE JOINT REPLACE,ALL PARTS Left 12/28/2022    Procedure: REVISION, TOTAL ARTHROPLASTY, KNEE, ALL COMPONENTS-LEFT;  Surgeon: Wild Luz M.D.;  Location: [facility];  Service: Orthopedics    IRRIGATION & DEBRIDEMENT GENERAL Left 12/28/2022    Procedure: IRRIGATION AND DEBRIDEMENT, WOUND;  Surgeon: Wild Luz M.D.;  Location: [facility];  Service: Orthopedics    PB REVISE KNEE JOINT REPLACE,ALL PARTS Left 7/19/2022    Procedure: REVISION, TOTAL ARTHROPLASTY, KNEE, ALL COMPONENTS - ANTIBIOTIC SPACER;  Surgeon: Wild Luz M.D.;  Location: [facility];  Service: Orthopedics    IRRIGATION & DEBRIDEMENT GENERAL Left 7/17/2022    Procedure: IRRIGATION AND DEBRIDEMENT, SHOULDER;  Surgeon: Obdulio Gray M.D.;  Location: [facility];  Service: Orthopedics    PB TOTAL KNEE ARTHROPLASTY Left 8/24/2020    Procedure: ARTHROPLASTY, KNEE, TOTAL;  Surgeon: Víctor Faustin M.D.;  Location: [facility];  Service: Orthopedics    KNEE ARTHROPLASTY TOTAL Right 2018    IRRIGATION & DEBRIDEMENT ORTHO Right 9/3/2017    Procedure: IRRIGATION & DEBRIDEMENT ORTHO, POLY EXCHANGE;  Surgeon: Jerome Russell M.D.;  Location: [facility];  Service: Orthopedics    COLONOSCOPY WITH CLIPPING  10/28/2015    Procedure: COLONOSCOPY WITH CLIPPING;  Surgeon: Ruben Colon M.D.;   Location: [facility];  Service:     COLONOSCOPY WITH SCLEROTHERAPY  10/28/2015    Procedure: COLONOSCOPY WITH SCLEROTHERAPY;  Surgeon: Ruben Colon M.D.;  Location: ENDOSCOPY [facility];  Service:     COLONOSCOPY WITH TATTOOING  10/28/2015    Procedure: COLONOSCOPY WITH TATTOOING;  Surgeon: Ruben Colon M.D.;  Location: ENDOSCOPY [facility];  Service:     GYN SURGERY  2003    hysteroscoopy    GYN SURGERY  1982    tubal ligation       Precautions:       Objective   Observation and functional movement:  Sit>stand w/SPC RUE and rail bias RLE, multiple attempts to stand.  Gait w/SPC RLE, even stride, inc. SOPHY vs genu varus, stance phase past retract.     Range of motion and strength:    Knee AROM /PROM (deg)  R: WFL L: 0-85 . / 0-95   Strength  Hips  R: WFL   L F: 3- E: 5  HABD: 5, HIR/glut med: 3+ HER: 5  Knees  R: WFL  L: E: 4/5 F 4//5        Exercises/Treatment  Time-based treatments/modalities:           Assessment, Response and Plan:   Impairments: abnormal gait, abnormal or restricted ROM, impaired physical strength, lacks appropriate home exercise program and pain with function    Assessment details:     Barriers to therapy:  Comorbidities and psychosocial  Prognosis: fair    Goals:   Short Term Goals:   Pt. To self manage pain by following self care strategies recommended by Pt   Initiate hip strengthening  Short term goal time span:  2-4 weeks      Long Term Goals:    Pt. Able to sit> knee heigh chair w/ or w/out UE support with correct form   Increase gait speed with appropriate AD to age-related norms  Improve gait quality (present hip and knee flexion in swing phase; present push off in stance phase)  Pt. To be compliant w/ a HEP by prescribing PT        Long term goal time span:  2-4 months    Plan:   Therapy options:  Physical therapy treatment to continue  Planned therapy interventions:  Therapeutic Exercise (CPT 20329) and Self Care ADL Training (CPT  14249)  Frequency:  2x week  Duration in visits:  12  Discussed with:  Patient      Functional Assessment Used deferred        Referring provider co-signature:  I have reviewed this plan of care and my co-signature certifies the need for services.    Certification Period: 06/23/2023 to  09/21/23    Physician Signature: ________________________________ Date: ______________

## 2023-07-06 ENCOUNTER — HOSPITAL ENCOUNTER (OUTPATIENT)
Dept: LAB | Facility: MEDICAL CENTER | Age: 63
End: 2023-07-06
Attending: NURSE PRACTITIONER
Payer: MEDICAID

## 2023-07-06 ENCOUNTER — OFFICE VISIT (OUTPATIENT)
Dept: INFECTIOUS DISEASES | Facility: MEDICAL CENTER | Age: 63
End: 2023-07-06
Attending: NURSE PRACTITIONER
Payer: MEDICAID

## 2023-07-06 VITALS
TEMPERATURE: 98.9 F | HEART RATE: 61 BPM | HEIGHT: 68 IN | OXYGEN SATURATION: 94 % | RESPIRATION RATE: 16 BRPM | WEIGHT: 176.81 LBS | DIASTOLIC BLOOD PRESSURE: 64 MMHG | SYSTOLIC BLOOD PRESSURE: 124 MMHG | BODY MASS INDEX: 26.8 KG/M2

## 2023-07-06 DIAGNOSIS — T84.59XD INFECTION OF PROSTHETIC KNEE JOINT, SUBSEQUENT ENCOUNTER: ICD-10-CM

## 2023-07-06 DIAGNOSIS — M00.262 STREPTOCOCCAL ARTHRITIS OF LEFT KNEE (HCC): ICD-10-CM

## 2023-07-06 DIAGNOSIS — Z96.659 INFECTION OF PROSTHETIC KNEE JOINT, SUBSEQUENT ENCOUNTER: ICD-10-CM

## 2023-07-06 DIAGNOSIS — B95.1 STREPTOCOCCUS, GROUP B, AS THE CAUSE OF DISEASES CLASSIFIED ELSEWHERE: ICD-10-CM

## 2023-07-06 DIAGNOSIS — E11.9 TYPE 2 DIABETES MELLITUS WITHOUT COMPLICATION, WITHOUT LONG-TERM CURRENT USE OF INSULIN (HCC): ICD-10-CM

## 2023-07-06 LAB
ALBUMIN SERPL BCP-MCNC: 3.5 G/DL (ref 3.2–4.9)
ALBUMIN/GLOB SERPL: 1 G/DL
ALP SERPL-CCNC: 210 U/L (ref 30–99)
ALT SERPL-CCNC: 16 U/L (ref 2–50)
ANION GAP SERPL CALC-SCNC: 11 MMOL/L (ref 7–16)
AST SERPL-CCNC: 24 U/L (ref 12–45)
BASOPHILS # BLD AUTO: 1.4 % (ref 0–1.8)
BASOPHILS # BLD: 0.07 K/UL (ref 0–0.12)
BILIRUB SERPL-MCNC: 1 MG/DL (ref 0.1–1.5)
BUN SERPL-MCNC: 13 MG/DL (ref 8–22)
CALCIUM ALBUM COR SERPL-MCNC: 9.8 MG/DL (ref 8.5–10.5)
CALCIUM SERPL-MCNC: 9.4 MG/DL (ref 8.5–10.5)
CHLORIDE SERPL-SCNC: 109 MMOL/L (ref 96–112)
CO2 SERPL-SCNC: 21 MMOL/L (ref 20–33)
CREAT SERPL-MCNC: 0.82 MG/DL (ref 0.5–1.4)
EOSINOPHIL # BLD AUTO: 0.21 K/UL (ref 0–0.51)
EOSINOPHIL NFR BLD: 4.1 % (ref 0–6.9)
ERYTHROCYTE [DISTWIDTH] IN BLOOD BY AUTOMATED COUNT: 49.1 FL (ref 35.9–50)
GFR SERPLBLD CREATININE-BSD FMLA CKD-EPI: 80 ML/MIN/1.73 M 2
GLOBULIN SER CALC-MCNC: 3.4 G/DL (ref 1.9–3.5)
GLUCOSE SERPL-MCNC: 102 MG/DL (ref 65–99)
HCT VFR BLD AUTO: 33.7 % (ref 37–47)
HGB BLD-MCNC: 11.3 G/DL (ref 12–16)
IMM GRANULOCYTES # BLD AUTO: 0.01 K/UL (ref 0–0.11)
IMM GRANULOCYTES NFR BLD AUTO: 0.2 % (ref 0–0.9)
LYMPHOCYTES # BLD AUTO: 1.67 K/UL (ref 1–4.8)
LYMPHOCYTES NFR BLD: 32.2 % (ref 22–41)
MCH RBC QN AUTO: 29.7 PG (ref 27–33)
MCHC RBC AUTO-ENTMCNC: 33.5 G/DL (ref 32.2–35.5)
MCV RBC AUTO: 88.5 FL (ref 81.4–97.8)
MONOCYTES # BLD AUTO: 0.49 K/UL (ref 0–0.85)
MONOCYTES NFR BLD AUTO: 9.5 % (ref 0–13.4)
NEUTROPHILS # BLD AUTO: 2.73 K/UL (ref 1.82–7.42)
NEUTROPHILS NFR BLD: 52.6 % (ref 44–72)
NRBC # BLD AUTO: 0 K/UL
NRBC BLD-RTO: 0 /100 WBC (ref 0–0.2)
PLATELET # BLD AUTO: 126 K/UL (ref 164–446)
PMV BLD AUTO: 10.8 FL (ref 9–12.9)
POTASSIUM SERPL-SCNC: 3.7 MMOL/L (ref 3.6–5.5)
PROT SERPL-MCNC: 6.9 G/DL (ref 6–8.2)
RBC # BLD AUTO: 3.81 M/UL (ref 4.2–5.4)
SODIUM SERPL-SCNC: 141 MMOL/L (ref 135–145)
WBC # BLD AUTO: 5.2 K/UL (ref 4.8–10.8)

## 2023-07-06 PROCEDURE — 3078F DIAST BP <80 MM HG: CPT | Performed by: NURSE PRACTITIONER

## 2023-07-06 PROCEDURE — 99212 OFFICE O/P EST SF 10 MIN: CPT | Performed by: NURSE PRACTITIONER

## 2023-07-06 PROCEDURE — 36415 COLL VENOUS BLD VENIPUNCTURE: CPT

## 2023-07-06 PROCEDURE — 80053 COMPREHEN METABOLIC PANEL: CPT

## 2023-07-06 PROCEDURE — 3074F SYST BP LT 130 MM HG: CPT | Performed by: NURSE PRACTITIONER

## 2023-07-06 PROCEDURE — 85025 COMPLETE CBC W/AUTO DIFF WBC: CPT

## 2023-07-06 PROCEDURE — 99213 OFFICE O/P EST LOW 20 MIN: CPT | Performed by: NURSE PRACTITIONER

## 2023-07-06 RX ORDER — CEFAZOLIN SODIUM 1 G/3ML
INJECTION, POWDER, FOR SOLUTION INTRAMUSCULAR; INTRAVENOUS
COMMUNITY
End: 2023-07-06

## 2023-07-06 RX ORDER — ACETAMINOPHEN 500 MG
TABLET ORAL
COMMUNITY
End: 2023-07-06

## 2023-07-06 RX ORDER — EDOXABAN TOSYLATE 60 MG/1
TABLET, FILM COATED ORAL
COMMUNITY
End: 2023-07-06

## 2023-07-06 RX ORDER — DEXTROMETHORPHAN HBR, GUAIFENESIN 20; 200 MG/10ML; MG/10ML
LIQUID ORAL
COMMUNITY
Start: 2023-06-13 | End: 2023-07-06

## 2023-07-06 RX ORDER — CEPHALEXIN 500 MG/1
CAPSULE ORAL
COMMUNITY
End: 2023-07-06

## 2023-07-06 RX ORDER — IBUPROFEN 600 MG/1
1 TABLET ORAL EVERY 6 HOURS PRN
COMMUNITY
End: 2023-07-06

## 2023-07-06 RX ORDER — CALCIUM CITRATE/VITAMIN D3 200MG-6.25
TABLET ORAL
COMMUNITY
Start: 2023-06-13 | End: 2023-07-06

## 2023-07-06 RX ORDER — FLUTICASONE PROPIONATE 50 MCG
SPRAY, SUSPENSION (ML) NASAL
COMMUNITY
End: 2023-07-06

## 2023-07-06 RX ORDER — SUMATRIPTAN 50 MG/1
TABLET, FILM COATED ORAL
COMMUNITY
End: 2023-07-06

## 2023-07-06 RX ORDER — CETIRIZINE HYDROCHLORIDE 10 MG/1
TABLET ORAL
COMMUNITY
End: 2023-07-06

## 2023-07-06 RX ORDER — CEFTRIAXONE 2 G/1
INJECTION, POWDER, FOR SOLUTION INTRAMUSCULAR; INTRAVENOUS
COMMUNITY
End: 2023-07-06

## 2023-07-06 RX ORDER — TRANEXAMIC ACID 100 MG/ML
INJECTION, SOLUTION INTRAVENOUS
COMMUNITY
End: 2023-07-06

## 2023-07-06 RX ORDER — DIAPER,BRIEF,INFANT-TODD,DISP
EACH MISCELLANEOUS
COMMUNITY
Start: 2023-06-13 | End: 2023-07-06

## 2023-07-06 RX ORDER — ASCORBIC ACID 500 MG
TABLET ORAL
COMMUNITY
End: 2023-07-06

## 2023-07-06 RX ORDER — FLUCONAZOLE 150 MG/1
TABLET ORAL
COMMUNITY
End: 2023-07-06

## 2023-07-06 RX ORDER — FAMOTIDINE 40 MG/1
TABLET, FILM COATED ORAL
COMMUNITY
End: 2023-07-06

## 2023-07-06 RX ORDER — PSEUDOEPHEDRINE HCL 30 MG
TABLET ORAL
COMMUNITY
Start: 2023-05-24 | End: 2023-07-06

## 2023-07-06 RX ORDER — ALBUTEROL SULFATE 90 UG/1
AEROSOL, METERED RESPIRATORY (INHALATION)
COMMUNITY
End: 2023-07-06

## 2023-07-06 RX ORDER — AZITHROMYCIN 250 MG/1
TABLET, FILM COATED ORAL
COMMUNITY
End: 2023-07-06

## 2023-07-06 RX ORDER — DOCUSATE SODIUM 100 MG/1
CAPSULE, LIQUID FILLED ORAL
COMMUNITY
Start: 2023-06-16 | End: 2023-07-06

## 2023-07-06 RX ORDER — SULFAMETHOXAZOLE AND TRIMETHOPRIM 800; 160 MG/1; MG/1
TABLET ORAL
COMMUNITY
End: 2023-07-06

## 2023-07-06 RX ORDER — DEXAMETHASONE SODIUM PHOSPHATE 10 MG/ML
INJECTION INTRAMUSCULAR; INTRAVENOUS
COMMUNITY
End: 2023-07-06

## 2023-07-06 RX ORDER — CONJUGATED ESTROGENS 0.62 MG/G
CREAM VAGINAL
COMMUNITY
End: 2023-07-06

## 2023-07-06 RX ORDER — GINSENG 100 MG
CAPSULE ORAL
COMMUNITY
End: 2024-01-02

## 2023-07-06 RX ORDER — B-COMPLEX WITH VITAMIN C
TABLET ORAL
COMMUNITY
End: 2024-01-02

## 2023-07-06 RX ORDER — GABAPENTIN 300 MG/1
CAPSULE ORAL
COMMUNITY
End: 2023-07-06

## 2023-07-06 RX ORDER — CEFDINIR 300 MG/1
1 CAPSULE ORAL 2 TIMES DAILY
COMMUNITY
End: 2023-07-06

## 2023-07-06 RX ORDER — ALBUTEROL SULFATE 90 UG/1
AEROSOL, METERED RESPIRATORY (INHALATION)
COMMUNITY
Start: 2023-05-19 | End: 2023-07-06

## 2023-07-06 RX ORDER — BUPIVACAINE HYDROCHLORIDE AND EPINEPHRINE 5; 5 MG/ML; UG/ML
INJECTION, SOLUTION EPIDURAL; INTRACAUDAL; PERINEURAL
COMMUNITY
End: 2024-01-02

## 2023-07-06 RX ORDER — HYDROCODONE BITARTRATE AND ACETAMINOPHEN 5; 325 MG/1; MG/1
TABLET ORAL
COMMUNITY
End: 2023-07-06

## 2023-07-06 RX ORDER — ERGOCALCIFEROL 1.25 MG/1
CAPSULE ORAL
COMMUNITY
End: 2023-07-06

## 2023-07-06 RX ORDER — CETIRIZINE HYDROCHLORIDE 10 MG/1
TABLET ORAL
COMMUNITY
Start: 2023-06-13 | End: 2023-07-06

## 2023-07-06 RX ORDER — EDOXABAN TOSYLATE 30 MG/1
TABLET, FILM COATED ORAL
COMMUNITY
End: 2023-07-06

## 2023-07-06 RX ORDER — GUAIFENESIN 1200 MG/1
TABLET, EXTENDED RELEASE ORAL
COMMUNITY
End: 2023-07-06

## 2023-07-06 RX ORDER — DEXTROMETHORPHAN HBR. AND GUAIFENESIN 10; 100 MG/5ML; MG/5ML
SOLUTION ORAL
COMMUNITY
Start: 2023-05-31 | End: 2023-07-06

## 2023-07-06 RX ORDER — AMLODIPINE BESYLATE 5 MG/1
TABLET ORAL
COMMUNITY
End: 2023-07-06

## 2023-07-06 RX ORDER — AMOXICILLIN 500 MG/1
CAPSULE ORAL
COMMUNITY
End: 2023-07-06

## 2023-07-06 RX ORDER — CHOLECALCIFEROL (VITAMIN D3) 125 MCG
CAPSULE ORAL
COMMUNITY
End: 2023-07-06

## 2023-07-06 RX ORDER — AMOXICILLIN AND CLAVULANATE POTASSIUM 500; 125 MG/1; MG/1
TABLET, FILM COATED ORAL
COMMUNITY
End: 2023-07-06

## 2023-07-06 RX ORDER — ERTAPENEM 1 G/1
INJECTION, POWDER, LYOPHILIZED, FOR SOLUTION INTRAMUSCULAR; INTRAVENOUS
COMMUNITY
End: 2023-07-06

## 2023-07-06 RX ORDER — TRIAMCINOLONE ACETONIDE 1 MG/G
CREAM TOPICAL
COMMUNITY
End: 2023-07-06

## 2023-07-06 RX ORDER — DRONEDARONE 400 MG/1
1 TABLET, FILM COATED ORAL 2 TIMES DAILY
COMMUNITY
End: 2023-07-06

## 2023-07-06 ASSESSMENT — ENCOUNTER SYMPTOMS
MYALGIAS: 0
VOMITING: 0
CHILLS: 0
HEADACHES: 0
NERVOUS/ANXIOUS: 0
DIZZINESS: 0
DOUBLE VISION: 0
COUGH: 0
ABDOMINAL PAIN: 0
FEVER: 0
WEAKNESS: 0
SHORTNESS OF BREATH: 0
FOCAL WEAKNESS: 0
NAUSEA: 0
PALPITATIONS: 0
WHEEZING: 0
BRUISES/BLEEDS EASILY: 0
BLURRED VISION: 0
SPUTUM PRODUCTION: 0
WEIGHT LOSS: 0

## 2023-07-06 ASSESSMENT — FIBROSIS 4 INDEX: FIB4 SCORE: 5.19

## 2023-07-06 NOTE — PROGRESS NOTES
INFECTIOUS  DISEASE  OUTPATIENT CLINIC  NOTE   Subjective   Primary care provider:   Anum Gatica M.D.      Reason for Follow Up:   Follow-up for   1. Infection of prosthetic knee joint, subsequent encounter        2. Streptococcal arthritis of left knee (HCC)        3. Streptococcus, group b, as the cause of diseases classified elsewhere        4. Type 2 diabetes mellitus without complication, without long-term current use of insulin (HCC)            HPI: Patient previously seen and treated by ID team as inpatient during hospital admission.   April Alicia is a 62 y.o. female with a history of alcohol and cocaine abuse, cirrhosis, asthma, A-fib on apixaban, 2 diabetes, hypertension. Patient was admitted on July 2022 with a group B strep bacteremia and left knee prosthetic joint infection along with a left shoulder septic arthritis and some subdeltoid abscess.  Plan was for her to discharge to a skilled nursing facility and continue IV ceftriaxone for 6 weeks until 8/30/2022 due to some unknown reason patient only received 4 weeks of antibiotics.  Patient was readmitted on 12/26/2022 due to recurrent left knee pain and swelling.  She was taken back to the OR on 12/28/22 underwent explantation and placement of articulating spacer, wound VAC.  Multiple OR cultures again growing group B Strep.   Underwent synovial fluid removal from the left shoulder on 1/2/2022 WBC 1051, 40% polys, 40% lymphs, not consistent with infection.  Plan at discharge with 6 weeks of IV antibiotics ceftriaxone 2 g daily till 2/7/2023    2/15-Orthopedic MD Garcia note recommending patient continue oral antibiotics for at least 1 year prior to reimplantation as she is undergone 2 I&D's in the past with 2 antibiotic spacers which were both failed.  She is at high risk for failing stage II reimplantation and will be subject to amputation if failed again.  We will continue with oral Augmentin 500/ 125 mg twice daily for an additional  year    02/16/23- Today Patient reports feeling well. Pt stating that the wound is healing well. Pt being followed by the [facility]. Wound pictures reviewed in EPIC. Denies drainage, pungent odor, redness, pain. Denies feeling generally ill, fevers/chills, general malaise, headache, n/v/d.     3/15/23- Today Patient reports feeling  good. Denies feeling generally ill, fevers/chills, general malaise, headache, n/v/d.  Patient states that she has not taken her Augmentin for the last 3 days as the care facility she was discharged from did not give her a supply.  Medication reordered and to be picked up at her pharmacy.  Tolerating the Augmentin with no complaints of side effects.     4/18/23-patient comes in today with complaints of not feeling well. She reports increasing fatigue, trouble staying awake, and decreasing appetite. Her symptoms started about 2 weeks ago when started to notice increased swelling of her left knee with warmth despite treatment with chronic dosing of Augmentin.  I am concerned that she may have possibly failed outpatient treatment and needs additional services to rule out worsening infection.  Patient escorted to ER    5/16/23- Previously admitted into the ER for further evaluation. Repeat CT  4/19 Previous left total knee replacement and fusion. No evidence of periprosthetic fracture or loosening. No significant joint effusion or soft tissue gas. 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  Plan to transition back to p.o. Augmentin 875/125 mg twice daily due to increased bioavailability.  Most recent labs reviewed from 4/22/2023, chronic leukopenia 3.8 stable, chronic anemia, hypoalbuminemia 2.5.  Patient continues to have complaints of chronic fatigue, left knee warmth, and bouts of nausea.  HIV screening, repeat CBC/CMP.  Recommended close follow-up with PCP for further evaluation of chronic fatigue  and leukopenia.  Previous HIV screening negative 2021 6/1/23- repeat HIV screening negative.  Previous labs from 5/16/2023 reviewed, WBC WNL 4.8, thrombocytopenia stable 96, elevated alk phos level 277 stable compared to previous labs and consistent with history of alcohol cirrhosis.  Continues to tolerate p.o. Augmentin without any complaints of side effects.  Left knee with continued swelling but no erythema, drainage or worsening pain.  Denies any recent fevers, chills, nausea, vomiting, constipation or diarrhea but does complain of chronic fatigue.  Continues to follow-up with cardiology, no longer in A-fib and weaning off Multaq and recently started on metoprolol for PVCs/PACs.      7/6/23-patient reports that she is feeling okay.  She does still have complaints of decreased range of motion in her left knee.  She recently started PT is working on increasing mobility.  She is tolerating the p.o. Augmentin with no complaints of side effects.  She denies any fever, chills, nausea, vomiting, constipation or diarrhea.  Most recent labs reviewed from 5/16/2023 as discussed above.  She has not completed her repeat labs.  Continue with chronic dosing of Augmentin until stop date of 12/28/2023      Current Antimicrobials: Augmentin 875/125 mg twice daily, stop date 12/28/23  Previous Antimicrobials: Unasyn, ceftriaxone, Ancef, cefazolin, Augmentin    Other Current Medications:       PMH:  Past Medical History:   Diagnosis Date    Arrhythmia     Arthritis     OA in knees    ASTHMA     Blood clotting disorder (HCC) 2018    right leg blood clot    Dental disorder     upper and lower dentures    Diabetes     Emphysema of lung (HCC)     Heart abnormality     leakage in left valve    Heart burn     left knee    Heart valve disease     Hypertension     Infection 9/4/2017    Infection 2018    Right knee after total knee    Liver disease     Pneumonia 02/2020    Seizure disorder (HCC)      Past Surgical History:   Procedure  Laterality Date    PB REVISE KNEE JOINT REPLACE,ALL PARTS Left 12/28/2022    Procedure: REVISION, TOTAL ARTHROPLASTY, KNEE, ALL COMPONENTS-LEFT;  Surgeon: Wild Luz M.D.;  Location: SURGERY [facility];  Service: Orthopedics    IRRIGATION & DEBRIDEMENT GENERAL Left 12/28/2022    Procedure: IRRIGATION AND DEBRIDEMENT, WOUND;  Surgeon: Wild Luz M.D.;  Location: SURGERY [facility];  Service: Orthopedics    PB REVISE KNEE JOINT REPLACE,ALL PARTS Left 7/19/2022    Procedure: REVISION, TOTAL ARTHROPLASTY, KNEE, ALL COMPONENTS - ANTIBIOTIC SPACER;  Surgeon: Wild Luz M.D.;  Location: SURGERY [facility];  Service: Orthopedics    IRRIGATION & DEBRIDEMENT GENERAL Left 7/17/2022    Procedure: IRRIGATION AND DEBRIDEMENT, SHOULDER;  Surgeon: Obdulio Gray M.D.;  Location: [facility];  Service: Orthopedics    PB TOTAL KNEE ARTHROPLASTY Left 8/24/2020    Procedure: ARTHROPLASTY, KNEE, TOTAL;  Surgeon: Víctor Faustin M.D.;  Location: [facility];  Service: Orthopedics    KNEE ARTHROPLASTY TOTAL Right 2018    IRRIGATION & DEBRIDEMENT ORTHO Right 9/3/2017    Procedure: IRRIGATION & DEBRIDEMENT ORTHO, POLY EXCHANGE;  Surgeon: Jerome Russell M.D.;  Location: [facility];  Service: Orthopedics    COLONOSCOPY WITH CLIPPING  10/28/2015    Procedure: COLONOSCOPY WITH CLIPPING;  Surgeon: Ruben Colon M.D.;  Location: [facility];  Service:     COLONOSCOPY WITH SCLEROTHERAPY  10/28/2015    Procedure: COLONOSCOPY WITH SCLEROTHERAPY;  Surgeon: Ruben Colon M.D.;  Location: [facility];  Service:     COLONOSCOPY WITH TATTOOING  10/28/2015    Procedure: COLONOSCOPY WITH TATTOOING;  Surgeon: Ruben Colon M.D.;  Location: [facility];  Service:     GYN SURGERY  2003    hysteroscoopy    GYN SURGERY  1982    tubal ligation     History reviewed. No pertinent family history.  Social History     Socioeconomic History    Marital  status: Single     Spouse name: Not on file    Number of children: Not on file    Years of education: Not on file    Highest education level: Not on file   Occupational History    Not on file   Tobacco Use    Smoking status: Former     Types: Cigarettes     Quit date: 2016     Years since quittin.5    Smokeless tobacco: Former     Quit date: 2021    Tobacco comments:     a few a day when I can   Vaping Use    Vaping Use: Never used   Substance and Sexual Activity    Alcohol use: Not Currently    Drug use: Not Currently    Sexual activity: Not on file   Other Topics Concern    Not on file   Social History Narrative    Not on file     Social Determinants of Health     Financial Resource Strain: Low Risk  (2020)    Overall Financial Resource Strain (CARDIA)     Difficulty of Paying Living Expenses: Not hard at all   Food Insecurity: No Food Insecurity (2020)    Hunger Vital Sign     Worried About Running Out of Food in the Last Year: Never true     Ran Out of Food in the Last Year: Never true   Transportation Needs: No Transportation Needs (2020)    PRAPARE - Transportation     Lack of Transportation (Medical): No     Lack of Transportation (Non-Medical): No   Physical Activity: Not on file   Stress: Not on file   Social Connections: Not on file   Intimate Partner Violence: Not on file   Housing Stability: Not on file           Allergies/Intolerances:  Allergies   Allergen Reactions    Nkda [No Known Drug Allergy]        ROS:   Review of Systems   Constitutional:  Positive for malaise/fatigue. Negative for chills, fever and weight loss.   HENT:  Negative for congestion and hearing loss.    Eyes:  Negative for blurred vision and double vision.   Respiratory:  Negative for cough, sputum production, shortness of breath and wheezing.    Cardiovascular:  Negative for chest pain and palpitations.   Gastrointestinal:  Negative for abdominal pain, nausea and vomiting.   Genitourinary:  Negative for  "dysuria.   Musculoskeletal:  Positive for joint pain (Chronic left shoulder pain). Negative for myalgias.        Bilateral knees warmth   Skin:  Negative for itching and rash.   Neurological:  Negative for dizziness, focal weakness, weakness and headaches.   Endo/Heme/Allergies:  Does not bruise/bleed easily.   Psychiatric/Behavioral:  The patient is not nervous/anxious.       ROS was reviewed and were negative except as above.    Objective    Most Recent Vital Signs:  Blood Pressure 124/64 (BP Location: Left arm, Patient Position: Sitting, BP Cuff Size: Large adult)   Pulse 61   Temperature 37.2 °C (98.9 °F) (Temporal)   Respiration 16   Height 1.727 m (5' 8\")   Weight 80.2 kg (176 lb 12.9 oz)   Last Menstrual Period 06/02/2008   Oxygen Saturation 94%   Body Mass Index 26.88 kg/m²     Physical Exam:  Physical Exam  Vitals and nursing note reviewed.   Constitutional:       General: She is not in acute distress.     Appearance: Normal appearance.      Comments: Walker   HENT:      Head: Normocephalic and atraumatic.      Nose: Nose normal.      Mouth/Throat:      Mouth: Mucous membranes are moist.   Eyes:      Pupils: Pupils are equal, round, and reactive to light.   Cardiovascular:      Rate and Rhythm: Normal rate and regular rhythm.   Pulmonary:      Effort: Pulmonary effort is normal. No respiratory distress.      Breath sounds: Normal breath sounds. No stridor.   Abdominal:      General: There is no distension.      Palpations: Abdomen is soft.   Musculoskeletal:         General: Tenderness present. No swelling.      Cervical back: Normal range of motion.      Comments: Bilateral knees surgical scars with no erythema, swelling or drainage.  Increased warmth of bilateral knees.   Skin:     General: Skin is warm and dry.      Coloration: Skin is not jaundiced or pale.   Neurological:      General: No focal deficit present.      Mental Status: She is oriented to person, place, and time.      Cranial Nerves: " No cranial nerve deficit.      Sensory: No sensory deficit.   Psychiatric:         Mood and Affect: Mood normal.         Behavior: Behavior normal.          Pertinent Lab/Imaging Results:  [unfilled]  @CMP@  WBC   Date/Time Value Ref Range Status   05/16/2023 03:10 PM 4.8 4.8 - 10.8 K/uL Final     RBC   Date/Time Value Ref Range Status   05/16/2023 03:10 PM 4.06 (L) 4.20 - 5.40 M/uL Final     Hemoglobin   Date/Time Value Ref Range Status   05/16/2023 03:10 PM 11.7 (L) 12.0 - 16.0 g/dL Final     Hematocrit   Date/Time Value Ref Range Status   05/16/2023 03:10 PM 36.3 (L) 37.0 - 47.0 % Final     MCV   Date/Time Value Ref Range Status   05/16/2023 03:10 PM 89.4 81.4 - 97.8 fL Final     MCH   Date/Time Value Ref Range Status   05/16/2023 03:10 PM 28.8 27.0 - 33.0 pg Final     MCHC   Date/Time Value Ref Range Status   05/16/2023 03:10 PM 32.2 (L) 33.6 - 35.0 g/dL Final     MPV   Date/Time Value Ref Range Status   05/16/2023 03:10 PM 10.0 9.0 - 12.9 fL Final      Sodium   Date/Time Value Ref Range Status   05/16/2023 03:10  135 - 145 mmol/L Final     Potassium   Date/Time Value Ref Range Status   05/16/2023 03:10 PM 4.9 3.6 - 5.5 mmol/L Final     Chloride   Date/Time Value Ref Range Status   05/16/2023 03:10  (H) 96 - 112 mmol/L Final     Co2   Date/Time Value Ref Range Status   05/16/2023 03:10 PM 21 20 - 33 mmol/L Final     Glucose   Date/Time Value Ref Range Status   05/16/2023 03:10  (H) 65 - 99 mg/dL Final     Bun   Date/Time Value Ref Range Status   05/16/2023 03:10 PM 13 8 - 22 mg/dL Final     Creatinine   Date/Time Value Ref Range Status   05/16/2023 03:10 PM 0.67 0.50 - 1.40 mg/dL Final   03/12/2009 06:50 PM 0.9 0.5 - 1.4 mg/dL Final     Bun-Creatinine Ratio   Date/Time Value Ref Range Status   03/08/2022 10:02 PM 12.0  Final     Alkaline Phosphatase   Date/Time Value Ref Range Status   05/16/2023 03:10  (H) 30 - 99 U/L Final     AST(SGOT)   Date/Time Value Ref Range Status   05/16/2023  03:10 PM 44 12 - 45 U/L Final     ALT(SGPT)   Date/Time Value Ref Range Status   05/16/2023 03:10 PM 31 2 - 50 U/L Final     Total Bilirubin   Date/Time Value Ref Range Status   05/16/2023 03:10 PM 0.9 0.1 - 1.5 mg/dL Final      CPK Total   Date/Time Value Ref Range Status   08/04/2022 03:17  (H) 0 - 154 U/L Final          Blood Culture   Date Value Ref Range Status   09/04/2017 No growth after 5 days of incubation.  Final     Blood Culture Hold   Date Value Ref Range Status   10/03/2020 Collected  Final       Blood Culture   Date Value Ref Range Status   09/04/2017 No growth after 5 days of incubation.  Final     Blood Culture Hold   Date Value Ref Range Status   10/03/2020 Collected  Final    Micro:  Results         Procedure Component Value Units Date/Time     COV-2, FLU A/B, AND RSV BY PCR (2-4 HOURS CEPHEID): Collect NP swab in [place] [[id number]] Collected: 01/05/23 1531     Order Status: Completed Specimen: Respirate from Nasopharyngeal Updated: 01/05/23 1629       Influenza virus A RNA Negative       Influenza virus B, PCR Negative       RSV, PCR Negative       SARS-CoV-2 by PCR NotDetected       Comment: [facility] providers: PLEASE REFER TO DE-ESCALATION AND RETESTING PROTOCOL  on [email or web address]     **The [employer] GeneXpert Xpress SARS-CoV-2 RT-PCR Test has been made  available for use under the Emergency Use Authorization (EUA) only.             SARS-CoV-2 Source NP Swab     Narrative:       Collected By: [id number] JUAN CARLOS RAMOS     URINALYSIS [[id number]]  (Abnormal) Collected: 01/05/23 1515     Order Status: Completed Specimen: Urine, Clean Catch Updated: 01/05/23 1603       Color Yellow       Character Clear       Specific Gravity 1.014       Ph 6.5       Glucose Negative mg/dL         Ketones Negative mg/dL         Protein Negative mg/dL         Bilirubin Negative       Urobilinogen, Urine 0.2       Nitrite Negative       Leukocyte Esterase Negative       Occult Blood Small       Micro Urine Req  "Microscopic     Narrative:       Collected By: [id number] RANGEL MIKA RAMOS     FLUID CULTURE W/GRAM STAIN [[id number]] Collected: 01/02/23 1545     Order Status: Completed Specimen: Synovial Updated: 01/05/23 0945       Significant Indicator NEG       Source SYNO       Site Left Shoulder       Culture Result No growth at 72 hours.       Gram Stain Result Rare WBCs.  No organisms seen.        BLOOD CULTURE [[id number]] Collected: 01/03/23 0842     Order Status: Completed Specimen: Blood from Peripheral Updated: 01/04/23 0728       Significant Indicator NEG       Source BLD       Site PERIPHERAL       Culture Result No Growth  Note: Blood cultures are incubated for 5 days and  are monitored continuously.Positive blood cultures  are called to the RN and reported as soon as  they are identified.        Narrative:       Per Hospital Policy: Only change Specimen Src: to \"Line\" if  specified by physician order.  Right Hand     BLOOD CULTURE [[id number]] Collected: 01/03/23 0842     Order Status: Completed Specimen: Blood from Peripheral Updated: 01/04/23 0728       Significant Indicator NEG       Source BLD       Site PERIPHERAL       Culture Result No Growth  Note: Blood cultures are incubated for 5 days and  are monitored continuously.Positive blood cultures  are called to the RN and reported as soon as  they are identified.        Narrative:       Per Hospital Policy: Only change Specimen Src: to \"Line\" if  specified by physician order.  Left Forearm/Arm     URINALYSIS [[id number]]  (Abnormal) Collected: 01/03/23 1952     Order Status: Completed Specimen: Urine, Cath Updated: 01/03/23 2013       Color Yellow       Character Clear       Specific Gravity 1.020       Ph 5.5       Glucose Negative mg/dL         Ketones Negative mg/dL         Protein Negative mg/dL         Bilirubin Negative       Urobilinogen, Urine 0.2       Nitrite Negative       Leukocyte Esterase Negative       Occult Blood Small       Micro Urine Req " "Microscopic     Narrative:       Collected By: [id number] CECI BRANDT     GRAM STAIN [[id number]] Collected: 01/02/23 1545     Order Status: Completed Specimen: Synovial Updated: 01/02/23 1836       Significant Indicator .       Source SYNO       Site Left Shoulder       Gram Stain Result Rare WBCs.  No organisms seen.        FLUID CULTURE W/GRAM STAIN [[id number]]       Order Status: No result Specimen: Body Fluid from Synovial Fluid       Blood Culture [[id number]] Collected: 12/26/22 2202     Order Status: Completed Specimen: Blood from Peripheral Updated: 12/31/22 2300       Significant Indicator NEG       Source BLD       Site PERIPHERAL       Culture Result No growth after 5 days of incubation.     Narrative:       1 of 2 for Blood Culture x 2 sites order. Per Hospital  Policy: Only change Specimen Src: to \"Line\" if specified by  physician order.  No site indicated     Blood Culture [[id number]] Collected: 12/26/22 2225     Order Status: Completed Specimen: Blood from Peripheral Updated: 12/31/22 2300       Significant Indicator NEG       Source BLD       Site PERIPHERAL       Culture Result No growth after 5 days of incubation.     Narrative:       2 of 2 blood culture x2  Sites order. Per Hospital Policy:  Only change Specimen Src: to \"Line\" if specified by physician  order.  Left Hand     CULTURE TISSUE W/ GRM STAIN [[id number]]  (Abnormal) Collected: 12/28/22 1351     Order Status: Completed Specimen: Tissue Updated: 12/31/22 1205       Significant Indicator POS       Source TISS       Site Left anterior tibia       Culture Result -       Gram Stain Result Rare WBCs.  No organisms seen.          Culture Result Streptococcus agalactiae (Group B)  Light growth       Narrative:       Surgery Specimen     Anaerobic Culture [[id number]] Collected: 12/28/22 1351     Order Status: Completed Specimen: Tissue Updated: 12/31/22 1205       Significant Indicator NEG       Source TISS       Site Left anterior tibia       " Culture Result No Anaerobes isolated.     Narrative:       Surgery Specimen     CULTURE TISSUE W/ GRM STAIN [[id number]]  (Abnormal) Collected: 12/28/22 1350     Order Status: Completed Specimen: Tissue Updated: 12/31/22 1201       Significant Indicator POS       Source TISS       Site Left knee lateral gutter       Culture Result -       Gram Stain Result Many WBCs.  No organisms seen.          Culture Result Streptococcus agalactiae (Group B)  Light growth       Narrative:       Surgery Specimen     Anaerobic Culture [[id number]] Collected: 12/28/22 1350     Order Status: Completed Specimen: Tissue Updated: 12/31/22 1201       Significant Indicator NEG       Source TISS       Site Left knee lateral gutter       Culture Result No Anaerobes isolated.     Narrative:       Surgery Specimen     CULTURE TISSUE W/ GRM STAIN [[id number]]  (Abnormal) Collected: 12/28/22 1432     Order Status: Completed Specimen: Tissue Updated: 12/31/22 1200       Significant Indicator POS       Source TISS       Site Left femur       Culture Result -       Gram Stain Result Rare WBCs.  No organisms seen.          Culture Result Streptococcus agalactiae (Group B)  Light growth       Narrative:       Surgery Specimen     Anaerobic Culture [[id number]] Collected: 12/28/22 1432     Order Status: Completed Specimen: Tissue Updated: 12/31/22 1200       Significant Indicator NEG       Source TISS       Site Left femur       Culture Result No Anaerobes isolated.     Narrative:       Surgery Specimen     CULTURE TISSUE W/ GRM STAIN [[id number]]  (Abnormal) Collected: 12/28/22 1433     Order Status: Completed Specimen: Tissue Updated: 12/31/22 1159       Significant Indicator POS       Source TISS       Site Left Tibia       Culture Result -       Gram Stain Result Many WBCs.  No organisms seen.          Culture Result Streptococcus agalactiae (Group B)  Light growth       Narrative:       Surgery Specimen     Anaerobic Culture [[id number]] Collected:  12/28/22 1433     Order Status: Completed Specimen: Tissue Updated: 12/31/22 1159       Significant Indicator NEG       Source TISS       Site Left Tibia       Culture Result No Anaerobes isolated.     Narrative:       Surgery Specimen     CULTURE TISSUE W/ GRM STAIN [[id number]]  (Abnormal) Collected: 12/28/22 1349     Order Status: Completed Specimen: Tissue Updated: 12/31/22 1159       Significant Indicator POS       Source TISS       Site Left Knee Medial Gutter       Culture Result -       Gram Stain Result Rare WBCs.  No organisms seen.          Culture Result Streptococcus agalactiae (Group B)  Light growth       Narrative:       Surgery Specimen     Anaerobic Culture [[id number]] Collected: 12/28/22 1349     Order Status: Completed Specimen: Tissue Updated: 12/31/22 1159       Significant Indicator NEG       Source TISS       Site Left Knee Medial Gutter       Culture Result No Anaerobes isolated.     Narrative:       Surgery Specimen     FLUID CULTURE W/GRAM STAIN  Order: [id number]  Status: Final result     Visible to patient: No (inaccessible in MyChart)     Next appt: Today at 11:00 AM in Infectious Diseases (Mateus Shell A.P.R.N.)     Specimen Information: Synovial Fluid   0 Result Notes      Component 1 mo ago   Significant Indicator POS Positive (POS)    Source SYNO    Site Left Knee    Culture Result - Abnormal     Gram Stain Result  Abnormal   Many WBCs.   Rare Gram positive cocci.     Culture Result  Abnormal   Streptococcus agalactiae (Group B)   Light growth   This isolate does NOT demonstrate inducible Clindamycin   resistance in vitro.     Resulting Agency [facility]        Susceptibility     Streptococcus agalactiae (group b)     [facility]E     Clindamycin <=0.25 mcg/mL Sensitive     Daptomycin <=0.5 mcg/mL Sensitive     Penicillin <=0.03 mcg/mL Sensitive                       Impression/Assessment      1. Infection of prosthetic knee joint, subsequent encounter        2. Streptococcal arthritis of left knee  ([facility])        3. Streptococcus, group b, as the cause of diseases classified elsewhere        4. Type 2 diabetes mellitus without complication, without long-term current use of insulin ([facility])          April Alicia is a 62 y.o. female with a history of alcohol and cocaine abuse, cirrhosis, asthma, A-fib on apixaban, 2 diabetes, hypertension. Patient was admitted on July 2022 with a group B strep bacteremia and left knee prosthetic joint infection along with a left shoulder septic arthritis and some subdeltoid abscess.  Plan was for her to discharge to a skilled nursing facility and continue IV ceftriaxone for 6 weeks until 8/30/2022 due to some unknown reason patient only received 4 weeks of antibiotics.  Patient was readmitted on 12/26/2022 due to recurrent left knee pain and swelling.  She was taken back to the OR on 12/28 underwent explantation and placement of articulating  spacer, wound VAC.  Multiple OR cultures again growing group B Strep.   Underwent synovial fluid removal from the left shoulder on 1/2/2022 WBC 1051, 40% polys, 40% lymphs, not consistent with infection.  Streptococcus group B (penicillin sensitive). Plan at discharge with 6 weeks of IV antibiotics ceftriaxone 2 g daily till 2/7/2023. Orthopedic MD Garcia note recommending patient continue oral antibiotics for at least 1 year prior to reimplantation as she is undergone 2 I&D's in the past with 2 antibiotic spacers which were both failed.  She is at high risk for failing stage II reimplantation and will be subject to amputation if failed again.      Continue p.o. Augmentin 875/125 mg twice daily for at least 1 year from date of last surgery (end date 12/28/2023).  Monthly monitoring. Continues to follow-up with cardiology, no longer in A-fib and weaning off Multaq and recently started on metoprolol for PVCs/PACs.     PLAN:   -Continue Augmentin 875/125 mg twice daily, end date 12/28/2023.  Medication education provided and side effects  to monitor.   -Repeat CBC/CMP yet to be completed Previous labs from 5/16/2023 reviewed, WBC WNL 4.8, thrombocytopenia stable 96, elevated alk phos level 277 stable compared to previous labs and consistent with history of alcohol cirrhosis   -Recommend  follow-up with PCP for eval for chronic fatigue and leukopenia for additional testing, not related to antibiotic therapy as evident by past labs   -Continue routine follow-up with orthopedic surgery and cardiology  -Recommend to keep blood sugars below 140 to allow for adequate wound healing and prevent secondary infection.  Continue on atorvastatin 20 mg.  Diabetes managed through diet.  Last A1c 5.4 from 1/1/2023  -Education provided on worsening signs and symptoms of infection and when to report to ER/call 911      Return visit: 3 months. follow up with primary care physician for chronic medical problems      I have performed a physical exam,  updated ROS and plan today. I have reviewed previous images, labs, and provider notes.      DANYELL Baez    All Patients should seek medical re-evaluation or report to the ER for new, increasing or worsening symptoms. In some circumstances medical conditions can change from the initial evaluation and may require emergent medical re-evaluation. This includes but is not limited to chest pain, shortness of breath, atypical abdominal pain, atypical headache, ALOC, fever >101, low blood pressure, high respiratory rate (above 30), low oxygen saturation (below 90%), acute delirium, abnormal bleeding, inability to tolerate any intake, weakness on one side of the body, any worsened or concerning conditions.    Please note that this dictation was created using voice recognition software. I have worked with technical experts from [facility] to optimize the interface.  I have made every reasonable attempt to correct obvious errors, but there may be errors of grammar and possibly content that I did not discover before  finalizing the note.

## 2023-07-11 ENCOUNTER — PHYSICAL THERAPY (OUTPATIENT)
Dept: PHYSICAL THERAPY | Facility: REHABILITATION | Age: 63
End: 2023-07-11
Attending: INTERNAL MEDICINE
Payer: MEDICAID

## 2023-07-11 DIAGNOSIS — G89.4 CHRONIC PAIN SYNDROME: ICD-10-CM

## 2023-07-11 PROCEDURE — 97110 THERAPEUTIC EXERCISES: CPT

## 2023-07-11 NOTE — OP THERAPY DAILY TREATMENT
"  Outpatient Physical Therapy  DAILY TREATMENT     [facility] Physical [address].  [address]  [place] [address]  Phone:  [phone]  Fax:  [phone]    Date: 07/11/2023    Patient: April Alicia  YOB: 1960  MRN: [id number]     Time Calculation    Start time: 1115  Stop time: 1200 Time Calculation (min): 45 minutes         Chief Complaint: Shoulder Pain and Knee Pain  Precautions: fall risk, frequent fall h/o  Visit #: 2    SUBJECTIVE:  Over the weekend pt. Fell to the Right striking the ground. She c/o pain in her R>L Shs ( she has no c/o increase LE pain other than her baseline L knee pain   Pain rating (1-10) before treatment:  4  Pain rating (1-10) after treatment:  3    Location: 1. L knee , refers to: ant knee  2. L SH   Descriptors: 1.Throbbing and sting 2. Pain and reduced ROM     OBJECTIVE:  Current objective measures: Pt. Walks into clinic w/SPC, caught toe on scale in walk way, no lob.          Therapeutic Exercises (CPT 95178):     1. L LAQ, 2x10x5\"  PiTB, demo, VC for ex. 1-6    2. L seated heel digs, Isometric, 1x10 x5\"    3. supine Hip F w/KF, 1x8    4. bridge, 2x5    5. Supine L hip abd-add, 2x10 w/PiTB around    6. supine L HS, 10    7. bike seat 10, LLE pp2, L2.5-2.8  11'    9. sit >stand equal WB, 1x w/SPC    10. gait drills      Time-based treatments/modalities:    Physical Therapy Timed Treatment Charges  Therapeutic exercise minutes (CPT 64192): 40 minutes    ASSESSMENT:   Response to treatment: Initiated STGs. P/w Ltd. ROM of L Knee, dec. Strength of LLE, functional and mobility impairment.    Short Term Goals:   Pt. To self manage pain by following self care strategies recommended by Pt    Initiate hip strengthening    PLAN/RECOMMENDATIONS:   Plan for treatment: therapy treatment to continue next visit.  Planned interventions for next visit:  hep and continue with current treatment.         "

## 2023-07-13 ENCOUNTER — PHYSICAL THERAPY (OUTPATIENT)
Dept: PHYSICAL THERAPY | Facility: REHABILITATION | Age: 63
End: 2023-07-13
Attending: INTERNAL MEDICINE
Payer: MEDICAID

## 2023-07-13 DIAGNOSIS — G89.4 CHRONIC PAIN SYNDROME: ICD-10-CM

## 2023-07-13 PROCEDURE — 97110 THERAPEUTIC EXERCISES: CPT

## 2023-07-13 PROCEDURE — 97535 SELF CARE MNGMENT TRAINING: CPT

## 2023-07-13 NOTE — OP THERAPY DAILY TREATMENT
"        Outpatient Physical Therapy  DAILY TREATMENT     [facility] Physical [address]  [address]  [place] [address]  Phone:  [phone]  Fax:  [phone]    Date: 07/13/2023    Patient: April Alicia  YOB: 1960  MRN: [id number]     Time Calculation    Start time: 1115  Stop time: 1200 Time Calculation (min): 45 minutes         Chief Complaint: No chief complaint on file.  Precautions: fall risk, frequent fall h/o  [place] L>R (wears aids)  Visit #: 3    SUBJECTIVE:  She c/o pain in her R>L Sh pain. She also c/o pain in L knee after twisting in bed. I am taking Aleve arthritis formula.   Pain rating (1-10) before treatment:  8  Pain rating (1-10) after treatment:  4-5    Location: 1. L knee , refers to: ant knee  2. L SH   Descriptors: 1.Throbbing and sting 2. Pain and reduced ROM     OBJECTIVE:  Current objective measures: Pt. walks into clinic w/SPC in LUE,    AROM Shoulder  RUE F: 80* Abd 50*, remaining WFL  LUE F: 40* Abd 40*,ER: 30* remaining WFL   Comments: scapulae protraction, early upward rotation  HBH R: C7 L: temple/ear  HBB R: SIJ R: unable  PrOM SH  R: WFL   L: WFL, except F: 70 * S+ ABD: 50* crepitus and S+, ER 30* deg.,empty   Strength SH  Bilat. Grossly 3-/5    Therapeutic Exercises (CPT 37726):     1. L LAQ, demo, VC for ex. 1-6    2. L seated heel digs, Isometric    3. supine Hip F w/KF    4. bridge    5. Supine L hip abd-add    6. supine L HS    7. bike seat 10, LLE pp2    9. sit >stand equal WB    10. Shoulder - table slides F, scaption, R, L 1x10, demo, VC/TC    11. Scapula retraction, 10x5\", demo, VC/TC    12. C curve stretch, 2x20\", demo, VC/TC      Therapeutic Exercise Summary: SHOULDER TABLE SLIDES  [[id number]]    TABLE SLIDE - FLEXION -  Repeat 10 Times, Hold 1 Second(s), Complete 1 Set, Perform 2 Times a Day    TABLE SLIDE - SCAPTION -  Repeat 10 Times, Hold 1 Second(s), Complete 1 Set, Perform 2 Times a Day    SCAPULAR RETRACTIONS -  Repeat " 10 Times, Hold 5 Seconds, Complete 1 Set, Perform 2 Times a Day      Time-based treatments/modalities:    Physical Therapy Timed Treatment Charges  Functional training, self care minutes (CPT 92304): 10 minutes  Therapeutic exercise minutes (CPT 23354): 30 minutes  ASSESSMENT:   Response to treatment: assessed the shoulder today. P/W L>R reduced ROM, strength, w/reduced functional reach, scapula dyskinesis; evidence of L SH arthritis. Recommend skilled PT to address impairments and functional reach.     Short Term Goals( 2-3 weeks):   Pt. To follow a hep of ROM, postural exercises to help reduce pain and increase tolerance to ADLs  functional reach [facility] R: T1-2 L: top head    PLAN/RECOMMENDATIONS:   Plan for treatment: therapy treatment to continue next visit.  Planned interventions for next visit:  hep for LE, review SH hep, alternate SH, Knee and continue with current treatment.

## 2023-07-18 ENCOUNTER — PHYSICAL THERAPY (OUTPATIENT)
Dept: PHYSICAL THERAPY | Facility: REHABILITATION | Age: 63
End: 2023-07-18
Attending: INTERNAL MEDICINE
Payer: MEDICAID

## 2023-07-18 DIAGNOSIS — G89.4 PAIN SYNDROME, CHRONIC: ICD-10-CM

## 2023-07-18 PROCEDURE — 97110 THERAPEUTIC EXERCISES: CPT

## 2023-07-18 NOTE — OP THERAPY DAILY TREATMENT
Outpatient Physical Therapy  DAILY TREATMENT     [facility] Physical Therapy [address].  [address]  [place] [address]  Phone:  [phone]  Fax:  [phone]    Date: 07/18/2023    Patient: April Alicia  YOB: 1960  MRN: [id number]     Time Calculation    Start time: 1123  Stop time: 1205 Time Calculation (min): 42 minutes         Chief Complaint: No chief complaint on file.  Precautions: fall risk, frequent fall h/o  [place] L>R (wears aids)  Visit #: 4    SUBJECTIVE:  She c/o pain in her L>R Sh pain now, she is doing the hep.  She states the L knee is okay right now, only night time it hurts and still taking Aleve arthritis formula.   Pain rating (1-10) before treatment:  7  Pain rating (1-10) after treatment:  4-5    Location: 1. L knee , refers to: ant knee  2. L SH   Descriptors: 1.Throbbing and sting 2. Pain and reduced ROM     OBJECTIVE:  Current objective measures: Pt. walks into clinic w/SPC in LUE,    AROM Shoulder  RUE F: 80* Abd 50*,   LUE F: 40* Abd 40*,    Time-based treatments/modalities:    Physical Therapy Timed Treatment Charges  Therapeutic exercise minutes (CPT 28025): 40 minutes  ASSESSMENT:   Response to treatment:   Pt. Tolerant to progression from PROM to Isometrics and ARROM today. P/W R>L reduced ROM, strength, w/reduced functional reach, scapula dyskinesis; evidence of L SH arthritis.   Short Term Goals( 2-3 weeks):   Pt. To follow a hep of ROM, postural exercises to help reduce pain and increase tolerance to ADLs  functional reach [facility] R: T1-2 L: top head    PLAN/RECOMMENDATIONS:   Plan for treatment: therapy treatment to continue next visit.  Planned interventions for next visit:  hep for LE, alternate SH, Knee and continue with current treatment.

## 2023-07-20 ENCOUNTER — PHYSICAL THERAPY (OUTPATIENT)
Dept: PHYSICAL THERAPY | Facility: REHABILITATION | Age: 63
End: 2023-07-20
Attending: INTERNAL MEDICINE
Payer: MEDICAID

## 2023-07-20 DIAGNOSIS — G89.4 CHRONIC PAIN DISORDER: ICD-10-CM

## 2023-07-20 PROCEDURE — 97110 THERAPEUTIC EXERCISES: CPT

## 2023-07-20 PROCEDURE — 97535 SELF CARE MNGMENT TRAINING: CPT

## 2023-07-20 NOTE — OP THERAPY DAILY TREATMENT
Outpatient Physical Therapy  DAILY TREATMENT     [facility] Physical Therapy [address].  [address]  [place] [address]  Phone:  [phone]  Fax:  [phone]    Date: 07/20/2023    Patient: April Alicia  YOB: 1960  MRN: [id number]     Time Calculation    Start time: 1119              Chief Complaint: No chief complaint on file.  Precautions: fall risk, frequent fall h/o  [place] L>R (wears aids)  Visit #: 5    SUBJECTIVE:  She c/o pain in her L>R Sh pain now, she is doing the hep.  She states the L knee is stiff right now,  tPain rating (1-10) before treatment:  7  Pain rating (1-10) after treatment:  4-5    Location: 1. L knee , refers to: ant knee  2. L SH   Descriptors: 1.Throbbing and sting 2. Pain and reduced ROM;. Eases w/: Aleve arthritis formula.     OBJECTIVE:  Current objective measures: Pt. walks into clinic w/SPC in LUE,    AROM Shoulder  RUE F: 80* Abd 50*,   LUE F: 40* Abd 40*,    Time-based treatments/modalities:       ASSESSMENT:   Response to treatment:   Pt. Tolerant to progression from PROM to Isometrics and ARROM today. P/W R>L reduced ROM, strength, w/reduced functional reach, scapula dyskinesis; evidence of L SH arthritis.   Short Term Goals( 2-3 weeks):   Pt. To follow a hep of ROM, postural exercises to help reduce pain and increase tolerance to ADLs  functional reach [facility] R: T1-2 L: top head    PLAN/RECOMMENDATIONS:   Plan for treatment: therapy treatment to continue next visit.  Planned interventions for next visit:  hep for LE, alternate SH, Knee and continue with current treatment.

## 2023-07-20 NOTE — OP THERAPY DAILY TREATMENT
"  Outpatient Physical Therapy  DAILY TREATMENT     [facility] Physical [address].  [address]  [place] [address]  Phone:  [phone]  Fax:  [phone]    Date: 07/20/2023    Patient: April Alicia  YOB: 1960  MRN: [id number]     Time Calculation    Start time: 1117  Stop time: 1155 Time Calculation (min): 38 minutes         Chief Complaint: No chief complaint on file.    Visit #: 5    SUBJECTIVE:    She c/o pain in her L>R Sh pain that is improving a little with the hep.  She states the L knee is stiff right now,    Pain rating (1-10) before treatment:  3  Pain rating (1-10) after treatment:  3  Location: 1. L knee , refers to: ant knee  2. L SH   Descriptors: 1.Throbbing and sting 2. Pain and reduced ROM;. Eases w/: Aleve arthritis formula.       OBJECTIVE:  Current objective measures: L knee 5-95          Therapeutic Exercises (CPT 18737):     1. L LAQ, 2x10x5\"  PiTB, VC/TCfor ex. 1-6    2. L seated heel digs, Isometric, 1x10 x5\"    3. supine Hip F w/KF, 1x10 PiTB    4. bridge w/ add squeeze, 1x5    5. Supine L hip abd-add, 2x10 w/PiTB around    6. supine L HS    7. bike seat 10, pp2, L3  10'    9. sit >stand equal WB, 1x w/SPC    10. gait drills      Therapeutic Exercise Summary: Knee program w/T-Band [FFCTP34]    Seated LAQ with band -  Repeat 10 Times, Hold 1 Second(s), Complete 1 Set, Perform 3 Times a Week    MEDICINE BALL BRIDGE -  Repeat 10 Times, Hold 1 Second(s), Complete 1 Set, Perform 3 Times a Week    Supine Resisted Hip Flexion  -  Repeat 10 Times, Hold 2 Seconds, Complete 1 Set, Perform 3 Times a Week    Supine Hip Abduction Band -  Repeat 10 Times, Complete 1 Set, Perform 2 Times a Week      Time-based treatments/modalities:    Physical Therapy Timed Treatment Charges  Functional training, self care minutes (CPT 68806): 8 minutes  Therapeutic exercise minutes (CPT 11321): 30 minutes  ASSESSMENT:   Response to treatment: difficulty today with " communication patient repeating self and not following VC well 50% of time; pt. [place] & wears hearing aids. She will likely need review of LE hep d/t this. No increase pain from baseline.    PLAN/RECOMMENDATIONS:   Plan for treatment: therapy treatment to continue next visit.  Planned interventions for next visit: continue with current treatment.

## 2023-07-24 ENCOUNTER — PHYSICAL THERAPY (OUTPATIENT)
Dept: PHYSICAL THERAPY | Facility: REHABILITATION | Age: 63
End: 2023-07-24
Attending: INTERNAL MEDICINE
Payer: MEDICAID

## 2023-07-24 DIAGNOSIS — G89.4 CHRONIC PAIN SYNDROME: ICD-10-CM

## 2023-07-24 PROCEDURE — 97110 THERAPEUTIC EXERCISES: CPT

## 2023-07-24 NOTE — OP THERAPY DAILY TREATMENT
"  Outpatient Physical Therapy  DAILY TREATMENT     [facility] Physical [address].  [address]  [place] [address]  Phone:  [phone]  Fax:  [phone]    Date: 07/24/2023    Patient: April Alicia  YOB: 1960  MRN: [id number]     Time Calculation    Start time: 1405  Stop time: 1450 Time Calculation (min): 45 minutes         Chief Complaint: No chief complaint on file.    Visit #: 6    SUBJECTIVE:    I went outside for a little while, then came back in and vomited.  I can go to the [facility] that has a gym there's a  there, but she wants a note before I start again.\"  She c/o pain in her L>R Sh pain the L knee is stiff.   Pain rating (1-10) before treatment:  3  Pain rating (1-10) after treatment:  3  Location: 1. L knee , refers to: ant knee  2. L SH   Descriptors: 1.Throbbing and sting 2. Pain and reduced ROM;. Eases w/: Aleve arthritis formula.       OBJECTIVE:  Current objective measures:   seated posture: fwd headed w/inc. Sh elevation ; standing posture fwd trunk, SOPHY ant.  S Amb. In open gym w/out AD safe           Therapeutic Exercises (CPT 48547):     1. L LAQ, 2x10x5\"  PiTB, VC/TCfor ex. 1-6    2. L seated heel digs, Isometric, 1x10 x5\"    3. supine Hip F w/KF, 1x10 PiTB    4. bridge w/ add squeeze, 1x8-10    5. Supine L hip abd-add, 2x 5-10 w/PiTB around    6. supine L HS    7. bike seat 10, pp2, L3.5-4  7''    8. sit>partial stand w/ball sq., 1x10, demo, VC for posturing    10. gait drills, gait drills NBOS 3x 30 ft, max VC/TC uprignt posturing    11. standing balance l,R;, semi tandem 2x30\", max VC/TC for upright posture      Therapeutic Exercise Summary: Knee program w/T-Band [FFCTP34]    Seated LAQ with band -  Repeat 10 Times, Hold 1 Second(s), Complete 1 Set, Perform 3 Times a Week    MEDICINE BALL BRIDGE -  Repeat 10 Times, Hold 1 Second(s), Complete 1 Set, Perform 3 Times a Week    Supine Resisted Hip Flexion  -  Repeat 10 Times, " Hold 2 Seconds, Complete 1 Set, Perform 3 Times a Week    Supine Hip Abduction Band -  Repeat 10 Times, Complete 1 Set, Perform 2 Times a Week      Time-based treatments/modalities:    Physical Therapy Timed Treatment Charges  Therapeutic exercise minutes (CPT 77918): 40 minutes  ASSESSMENT:   Response to treatment: pt. Put a new battery in hearing aids so communication was improved today. Pt. Demo'd I w/HEP after successive reps. No increase pain from baseline.    PLAN/RECOMMENDATIONS:   Plan for treatment: therapy treatment to continue next visit.  Planned interventions for next visit: continue with current treatment. Pt. has access to a gym (for free)

## 2023-07-26 ENCOUNTER — APPOINTMENT (OUTPATIENT)
Dept: PHYSICAL THERAPY | Facility: REHABILITATION | Age: 63
End: 2023-07-26
Attending: INTERNAL MEDICINE
Payer: MEDICAID

## 2023-07-26 NOTE — OP THERAPY DAILY TREATMENT
"  Outpatient Physical Therapy  DAILY TREATMENT     [facility] Physical [address].  [address]  [place] [address]  Phone:  [phone]  Fax:  [phone]    Date: 07/26/2023    Patient: April Alicia  YOB: 1960  MRN: [id number]     Time Calculation                   Chief Complaint: No chief complaint on file.    Visit #: 7    SUBJECTIVE:    ***. I can go to [facility] that has [facility] gym there's a  there, but she wants a note before I start again.\"    She c/o pain in her L>R Sh pain the L knee is stiff.   Pain rating (1-10) before treatment:  3  Pain rating (1-10) after treatment:  3  Location: 1. L knee , refers to: ant knee  2. L SH   Descriptors: 1.Throbbing and sting 2. Pain and reduced ROM;. Eases w/: Aleve arthritis formula.       OBJECTIVE:  Current objective measures: standing posture fwd trunk, SOPHY ant.  seated posture: fwd headed w/inc. Sh elevation ;    AROM  RUE Elevation to sh height; LUE: to 40 deg. Strength (grossly)  L: 3-/5 R: 4/5 mmt.   LE:   S Amb. In open gym w/out AD safe           Therapeutic Exercises (CPT 79664):     1. L LAQ, 2x10x5\"  PiTB, VC/TCfor ex. 1-6    2. L seated heel digs, Isometric, 1x10 x5\"    3. supine Hip F w/KF, 1x10 PiTB    4. bridge w/ add squeeze, 1x8-10    5. Supine L hip abd-add, 2x 5-10 w/PiTB around    6. supine L HS    7. bike seat 10, pp2, L3.5-4  7''    8. sit>partial stand w/ball sq., 1x10, demo, VC for posturing    9. standing balance l,R;, semi tandem 2x30\", max VC/TC for upright posture    10. gait drills, gait drills NBOS 3x 30 ft, max VC/TC uprignt posturing    13. Supine AAROM L SH    14. standng wall scap squeezes    15. Standing wall abduction      Therapeutic Exercise Summary: Knee program w/T-Band [FFCTP34]    Seated LAQ with band -  Repeat 10 Times, Hold 1 Second(s), Complete 1 Set, Perform 3 Times a Week    MEDICINE BALL BRIDGE -  Repeat 10 Times, Hold 1 Second(s), Complete 1 Set, Perform 3 " Times a Week    Supine Resisted Hip Flexion  -  Repeat 10 Times, Hold 2 Seconds, Complete 1 Set, Perform 3 Times a Week    Supine Hip Abduction Band -  Repeat 10 Times, Complete 1 Set, Perform 2 Times a Week    Time-based treatments/modalities:       ASSESSMENT:   Response to treatment: ***. Pt. Demo'd I w/HEP after successive reps. No increase pain from baseline.    PLAN/RECOMMENDATIONS:   Plan for treatment: therapy treatment to continue next visit.  Planned interventions for next visit: continue with current treatment. Pt. has access to a gym (for free)

## 2023-07-26 NOTE — LETTER
July 26, 2023      No Recipients      Re:  April Alicia          Dear    No Recipients,    It was a pleasure seeing your patient, April Alicia, on 7/26/2023. There were no encounter diagnoses.     Please find enclosed a copy of this visit encounter which includes the history I obtained from Ms. Alicia, my physical examination findings, my impression and recommendations.      Once again, it was a pleasure participating in your patient's care.  Please feel free to contact me if you have any questions or if I can be of any further assistance to your patients.        Sincerely,          Dolores Montes, PT      No Recipients

## 2023-07-27 NOTE — PROGRESS NOTES
MEDICARE WELLNESS VISIT NOTE    HISTORY OF PRESENT ILLNESS:   Isra Coy presents for his Subsequent Annual Medicare Wellness Visit.   He has complaints or concerns which include see attached progress note.      Patient Care Team:  Juan Enriquez MD as PCP - General (Internal Medicine)        Patient Active Problem List   Diagnosis   • Hypothyroidism   • Dyslipidemia   • CVA (cerebral vascular accident) (CMD)   • Senile nuclear sclerosis   • PVC (premature ventricular contraction)   • RBBB   • Paroxysmal supraventricular tachycardia (CMD)   • Tricuspid regurgitation   • Internal hemorrhoid   • Type 2 diabetes mellitus with diabetic cataract (CMD)   • PAD (peripheral artery disease) (CMD)   • Atherosclerosis of native arteries of extremity with intermittent claudication (CMD)   • Family history of aneurysm   • Neuropathy   • Vitreomacular adhesion of both eyes   • Atherosclerosis of native arteries of extremities with intermittent claudication, bilateral legs (CMD)   • 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)   • Diabetic peripheral neuropathy (CMD)   • Glaucoma suspect   • History of stroke   • Late effects of cerebrovascular disease   • Pure hypercholesterolemia   • Malignant neoplasm prostate (CMD)         Past Medical History:   Diagnosis Date   • Atherosclerosis of native arteries of extremity with intermittent claudication (CMD)     bilateral legs   • Cardiovascular disease     late effects   • COVID    • CVA (cerebral vascular accident) (CMD)    • Diabetes (CMD)     type 2   • Dizziness and giddiness    • Dyslipidemia    • Family history of aneurysm    • Glaucoma suspect    • Hypothyroidism    • Internal hemorrhoid    • Malignant neoplasm (CMD) 05/02/2023    prostate cancer   • PAD (peripheral artery disease) (CMD)    • Paroxysmal supraventricular tachycardia (CMD)    • PVC (premature ventricular contraction)    • RBBB    • Senile nuclear sclerosis    • Shortness of  Hospital Medicine Daily Progress Note    Date of Service  12/28/2022    Chief Complaint  April Alicia is a 62 y.o. female admitted 12/26/2022 with left knee pain    Hospital Course  Mrs. Alicia is our 62 year old female patient with a past medical history significant for cocaine and ETOH abuse, cirrhosis, asthma, atrial fibrillation on apixaban, diabetes mellitus, hypertension who presents to the hospital with chief complaint of increased pain in her LEFT knee associated with fevers and chills that began today. Patient describes difficulty ambulating even short distances due to the pain which is atypical for her. Denies any nausea, vomiting, tremors, falls, loss of consciousness, or recent trauma to the knee. Patient is not a reliable historian; tangential when answering to questions. History procurement substantiated by thorough chart review.     Patient with history of bilateral knee surgeries. In 7/22, was found to have group B streptococcal bacteremia secondary to infection of tibial component of LEFT arthroplasty hardware, at which point orthopedics was consulted, arthrocentesis performed which confirmed infection, underwent LEFT knee revision, total arthroplasty with antibiotic spacer placed. Plan was then to continue with CTX daily through 8/30/22. Followed with Dr. Gray (of [facility]).     Patient endorses she has not drank alcohol for weeks, and denies recent cocaine use.    In the ER the patient underwent arthrocentesis of the left knee which showed 21,150 WBCs, 86% PMNs.  Culture grew group B strep.  Orthopedics and ID were consulted. She went to surgery with Dr. Luz on 12/28/22. She had symptoms of UTI and ucx grew E. Coli.  She was started on unasyn/vanc then switched to ceftriaxone.     Interval Problem Update  - febrile overnight 38.7  - feeling achy but overall a little better today, thinking more clearly  - had many BMs yesterday and had some today, bicarb 16,  breath    • Tricuspid regurgitation    • Vitreomacular adhesion of both eyes          Past Surgical History:   Procedure Laterality Date   • Thyroid lobectomy,unilat           Social History     Tobacco Use   • Smoking status: Former     Current packs/day: 0.00     Average packs/day: 1.5 packs/day for 45.0 years (67.5 ttl pk-yrs)     Types: Cigarettes     Start date: 1976     Quit date: 2021     Years since quittin.3   • Smokeless tobacco: Never   Vaping Use   • Vaping Use: never used   Substance Use Topics   • Alcohol use: Not Currently   • Drug use: Never     Drug use:    Drug Use:    Never           Family History   Problem Relation Age of Onset   • Diabetes Sister    • Diabetes Brother        Current Outpatient Medications   Medication Sig Dispense Refill   • abiraterone acetate (ZYTIGA) 250 MG Tab Take 120 mg by mouth daily.     • predniSONE (DELTASONE) 5 MG tablet Take 5 mg by mouth daily.     • lovastatin (MEVACOR) 40 MG tablet Take 2 tablets nightly 180 tablet 3   • lovastatin (MEVACOR) 40 MG tablet Take 2 tablets by mouth at bedtime.     • bicalutamide (Casodex) 50 MG tablet Take 1 tablet by mouth daily. 90 tablet 3   • aspirin 325 MG tablet Take 325 mg by mouth daily.     • metformin (GLUCOPHAGE) 1000 MG tablet Take 1,000 mg by mouth in the morning and 1,000 mg in the evening. Take with meals.     • tamsulosin (FLOMAX) 0.4 MG Cap Take 0.4 mg by mouth.     • pioglitazone (ACTOS) 45 MG tablet Take 45 mg by mouth daily.     • acarbose (PRECOSE) 100 MG tablet Take 100 mg by mouth in the morning and 100 mg at noon and 100 mg in the evening. Take with meals.     • amLODIPine (NORVASC) 5 MG tablet Take 1 tablet by mouth daily.     • glipiZIDE (GLUCOTROL) 10 MG tablet Take 1 tablet by mouth in the morning and 1 tablet in the evening.     • levothyroxine 125 MCG tablet Take 1 tablet by mouth daily.     • lisinopril (ZESTRIL) 40 MG tablet Take 1 tablet by mouth daily.     • metoPROLOL succinate  normal AG, switched IVF  - patient does report symptoms of UTI, ucx grew ecoli resident to PCN, swtiched from unasyn to CTX.   - consulting ID  - patient reports she has left shoulder pain and R, did fall on R side and no xrays, will obtain today    I have discussed this patient's plan of care and discharge plan at IDT rounds today with Case Management, Nursing, Nursing leadership, and other members of the IDT team.    Consultants/Specialty  infectious disease and orthopedics    Code Status  Full Code    Disposition  Patient is not medically cleared for discharge.   Anticipate discharge to  TBD .  I have placed the appropriate orders for post-discharge needs.    Review of Systems  Review of Systems   Constitutional:  Positive for fever and malaise/fatigue.   HENT:  Positive for hearing loss.    Eyes:  Negative for redness.   Respiratory:  Negative for shortness of breath.    Cardiovascular:  Positive for leg swelling. Negative for chest pain.   Gastrointestinal:  Negative for nausea and vomiting.   Genitourinary:  Positive for dysuria.   Musculoskeletal:  Positive for joint pain.   Skin: Negative.    Neurological:  Negative for dizziness.   Psychiatric/Behavioral:  Negative for substance abuse.       Physical Exam  Temp:  [36.2 °C (97.2 °F)-38.7 °C (101.7 °F)] 36.7 °C (98 °F)  Pulse:  [68-81] 79  Resp:  [15-16] 15  BP: (106-124)/(57-68) 124/66  SpO2:  [93 %-96 %] 93 %    Physical Exam  Constitutional:       General: She is not in acute distress.     Appearance: She is not toxic-appearing or diaphoretic.   HENT:      Head: Normocephalic and atraumatic.      Nose: Nose normal.      Mouth/Throat:      Mouth: Mucous membranes are moist.   Eyes:      General: No scleral icterus.     Conjunctiva/sclera: Conjunctivae normal.   Cardiovascular:      Rate and Rhythm: Normal rate and regular rhythm.      Heart sounds: Murmur heard.     No friction rub. No gallop.   Pulmonary:      Effort: Pulmonary effort is normal.       (TOPROL-XL) 50 MG 24 hr tablet Take 1 tablet by mouth daily.       No current facility-administered medications for this visit.        The following items on the Medicare Health Risk Assessment were found to be positive  2.) Would you like additional information on advance directives?: Yes     3.) During the past 4 weeks, how would you rate your health?: Fair     4.) During the past 4 weeks, what was the hardest physical activity you could do for at least 2 minutes?: Light     6 d.) How many servings of Sugar Sweetened Beverages do you have each day ( 1 serving = 1 can or 12 oz cup of sode or juice): 2 per day     11l.) Sexual Problems: Always     14.) During the past 4 weeks, was someone available to help if you needed and wanted help?: Yes, some         Vision and Hearing screens: No results found.    Advance care planning documents on file - no     Cognitive/Functional Status: no evidence of cognitive dysfunction by direct observation    Opioid Review: Isra is not taking opioid medications.    Recent PHQ 2/9 Score:    PHQ 2:       PHQ 9:       DEPRESSION ASSESSMENT/PLAN:  Depression screening is negative no further plan needed.     Body mass index is 26.78 kg/m².    BMI ASSESSMENT/PLAN:  Patient is overweight.    30 minutes of physical activity a day        Needed Screening/Treatment:   Hepatitis C  Needed follow up:  None    See orders.   See Patient Instructions section.   Return in about 3 months (around 10/27/2023).      Breath sounds: Normal breath sounds.   Abdominal:      General: Bowel sounds are normal. There is no distension.      Palpations: Abdomen is soft.      Tenderness: There is no abdominal tenderness.   Musculoskeletal:      Left shoulder: Decreased range of motion.      Cervical back: Normal range of motion.      Left knee: Swelling and effusion (warm) present. Decreased range of motion. Tenderness present.   Skin:     Findings: No erythema.      Comments: Skin graft scars b/l LEs   Neurological:      Mental Status: She is alert and oriented to person, place, and time. Mental status is at baseline.   Psychiatric:         Mood and Affect: Mood normal.         Behavior: Behavior normal.         Thought Content: Thought content normal.       Fluids    Intake/Output Summary (Last 24 hours) at 12/28/2022 0924  Last data filed at 12/28/2022 0637  Gross per 24 hour   Intake 3218.18 ml   Output 1100 ml   Net 2118.18 ml         Laboratory  Recent Labs     12/26/22 2202 12/27/22  0316 12/28/22  0747   WBC 7.0 7.1 8.3   RBC 3.41* 3.33* 3.43*   HEMOGLOBIN 9.8* 9.8* 10.0*   HEMATOCRIT 30.7* 29.8* 31.7*   MCV 90.0 89.5 92.4   MCH 28.7 29.4 29.2   MCHC 31.9* 32.9* 31.5*   RDW 57.9* 57.8* 59.4*   PLATELETCT 58* 59* 82*   MPV 10.1 10.7 11.7       Recent Labs     12/26/22 2202 12/27/22  0316 12/28/22  0747   SODIUM 130* 130* 138   POTASSIUM 4.3 4.1 3.9   CHLORIDE 103 103 113*   CO2 19* 17* 16*   GLUCOSE 128* 114* 114*   BUN 25* 24* 22   CREATININE 0.84 0.69 0.72   CALCIUM 8.4* 8.1* 7.8*                     Imaging  DX-CHEST-PORTABLE (1 VIEW)   Final Result      1.  No acute cardiopulmonary abnormality.   2.  Prior granulomatous exposure.         DX-KNEE 3 VIEWS LEFT   Final Result      1.  Revised left knee arthroplasty is unchanged since prior. No evidence of hardware complication.   2.  No acute fracture or dislocation.   3.  Marked soft tissue swelling and suspected knee effusion.      MR-SHOULDER-W/O LEFT    (Results Pending)    DX-SHOULDER 2+ RIGHT    (Results Pending)          Assessment/Plan  * Septic joint (HCC)- (present on admission)  Assessment & Plan  Patient has a history of group B strep bacteremia secondary to infection of tibial component of left arthroplasty hardware  Also with septic L shoulder  Status post antibiotic spacer placement by orthopedics (Dr. Luz)  Comes in with left knee pain and swelling and L shoulder pain  Arthrocentesis in the ER concerning for septic knee  Synovial cultures growing group B strep  Orthopedics consulted  Infectious disease consulted  Going for washout 12/28  Pain mgmt  PT/OT  MRI Shoulder    Metabolic acidosis  Assessment & Plan  Normal AG, likely in setting of multiple BMs from lactulose  Switched IVF today to add bicarb    Acute encephalopathy  Assessment & Plan  Likely in setting of infection +/- hepatic encephalopathy  Ammonia normal  Treat infection  Decreased gabapentin  Improved today, thinking more clearly  Having multiple BMs    GERD (gastroesophageal reflux disease)  Assessment & Plan  Continue home omeprazole    Type 2 diabetes mellitus (HCC)  Assessment & Plan  Diet controlled  SSI    Acute cystitis without hematuria- (present on admission)  Assessment & Plan  Patient c/o pain with urination and lower abd discomfort  UA c/f infection  ucx growing e.coli resistant to PCN  Switched from unasyn to CTX    Atrial fibrillation (HCC)- (present on admission)  Assessment & Plan  Continue home dronedarone 400 mg twice daily  Holding Eliquis    Cirrhosis (HCC)- (present on admission)  Assessment & Plan  In setting of alcohol use however no EtOH in over a year  Continue home lactulose    Hypertension- (present on admission)  Assessment & Plan  Home regimen: Losartan 100 mg daily, felodipine 5 mg q. evening  Inpatient regimen: continue home meds    Thrombocytopenia (HCC)- (present on admission)  Assessment & Plan  Likely in setting of cirrhosis  Holding Eliquis  Platelets stable  Continue to  monitor           VTE prophylaxis: SCDs/TEDs    I have performed a physical exam and reviewed and updated ROS and Plan today (12/28/2022). In review of yesterday's note (12/27/2022), there are no changes except as documented above.

## 2023-07-31 ENCOUNTER — APPOINTMENT (OUTPATIENT)
Dept: PHYSICAL THERAPY | Facility: REHABILITATION | Age: 63
End: 2023-07-31
Attending: INTERNAL MEDICINE
Payer: MEDICAID

## 2023-08-01 ENCOUNTER — PHYSICAL THERAPY (OUTPATIENT)
Dept: PHYSICAL THERAPY | Facility: REHABILITATION | Age: 63
End: 2023-08-01
Attending: NURSE PRACTITIONER
Payer: MEDICAID

## 2023-08-01 DIAGNOSIS — G89.4 CHRONIC PAIN SYNDROME: ICD-10-CM

## 2023-08-01 PROCEDURE — 97110 THERAPEUTIC EXERCISES: CPT

## 2023-08-01 NOTE — OP THERAPY DAILY TREATMENT
"  Outpatient Physical Therapy  DAILY TREATMENT     [facility] [address]  [address]  [place] [address]  Phone:  [phone]  Fax:  [phone]    Date: 08/01/2023    Patient: April Alicia  YOB: 1960  MRN: [id number]     Time Calculation    Start time: 1530  Stop time: 1600 Time Calculation (min): 30 minutes         Chief Complaint: Knee Problem and Shoulder Pain    Visit #: 7    SUBJECTIVE:    \"I just went to my ear MD and got my ears cleaned and aids adjusted.  I did hit my shoulder (the R side) and it's been sore.  I have stopped my hep (SH ex.) I went to the orthopedic yesterday and I may have to have an amputation d/t the leg infection that is not healing.\" Pt. to have a paracentesis soon.    Pain rating (1-10) before treatment: R SH 7-8 L knee: 8  Pain rating (1-10) after treatment:  R SH same as above L knee: 6-7  Location: 1. L knee , refers to: ant knee  2. R SH   Descriptors: 1.Throbbing and sting 2. Pain and reduced ROM;. Eases w/: Aleve arthritis formula.       OBJECTIVE:7-  Current objective measures:   seated posture: fwd headed w/inc. Sh elevation; standing posture fwd trunk, SOPHY ant.  AROM Shoulder  RUE F: 80* Abd 50*,   LUE F: 45*Abd 55,  Sit>Stand: w/ SPC;  #:5   Gait (L ref. Limb) w/SPC RUE: speed: 11 sec/20 ft.   Quality: improved swing and stance phases      Therapeutic Exercises (CPT 07396):     1. L LAQ, hep    2. L seated heel digs, Isometric, hep    3. supine Hip F w/KF    4. bridge w/ add squeeze, hep    5. Supine L hip abd-add, hep    6. supine L HS    7. bike seat 10, L pp2, L 4.5 6'    8. sit>partial stand w/ball sq., 1x10, demo, VC for posturing    10. gait drills, 2 x30 ft. SPC in RUE    11. standing balance L,R;, semi tandem 2x30\", max VC/TC for upright posture      Therapeutic Exercise Summary: Knee program w/T-Band [[id number]]    Seated LAQ with band -  Repeat 10 Times, Hold 1 Second(s), Complete 1 Set, Perform 3 Times a " Week    MEDICINE BALL BRIDGE -  Repeat 10 Times, Hold 1 Second(s), Complete 1 Set, Perform 3 Times a Week    Supine Resisted Hip Flexion  -  Repeat 10 Times, Hold 2 Seconds, Complete 1 Set, Perform 3 Times a Week    Supine Hip Abduction Band -  Repeat 10 Times, Complete 1 Set, Perform 2 Times a Week      Time-based treatments/modalities:    Physical Therapy Timed Treatment Charges  Therapeutic exercise minutes (CPT 32788): 24 minutes  ASSESSMENT:   Response to treatment:  No increase pain from baseline, knee pain actually improved post TE/testing. Bilat Sh: bilat.Sh ROM improved by 3-5 deg. See also Progress summary.     PLAN/RECOMMENDATIONS:   Plan for treatment: therapy treatment to continue next visit.  Planned interventions for next visit: continue with current treatment. PRN to extend authorization date to complete visits authorized.

## 2023-08-01 NOTE — OP THERAPY PROGRESS SUMMARY
Outpatient Physical Therapy  PROGRESS SUMMARY NOTE      [facility] Physical Therapy [place] [address]  [address]  [facility] [address]  Phone:  [phone]  Fax:  [phone]    Date of Visit: 08/01/2023    Patient: April Alicia  YOB: 1960  MRN: [id number]     Referring Provider: Anum Gatica M.D.  [address] [facility] [place] [address]   Referring Diagnosis Osteoarthritis of knee, unspecified [M17.9]     Visit Diagnoses     ICD-10-CM   1. Chronic pain syndrome  G89.4       Rehab Potential: good    Progress Report Period: 06/23/2023 to 08/01/2023    Functional Assessment Used          Objective Findings and Assessment:   Patient progression towards goals: Short Term Goals:   Pt. To self manage pain by following self care strategies recommended by Pt  -Not met pt. finding it difficult to self manage pain other than resting it d/t a recent flair up.  Initiate hip strengthening - Met  Short term goal time span:  2-4 weeks      Long Term Goals:    Pt. Able to sit> knee heigh chair w/ or w/out UE support with correct form - Met   Increase gait speed with appropriate AD to age-related norms  Improve gait quality (present hip and knee flexion in swing phase; present push off in stance phase) - Not met  Pt. To be compliant w/ a HEP by prescribing PT - partially met, pt. Had an exacerbation of R SH and stopped. She is doing LE strengthening      Objective findings and assessment details: Problem list:  1.AROM Shoulder RUE F: 80 Abd 50, LUE F: 45* Abd 55* Improved since SOC  2.Sit>Stand: w/ SPC;  5x; good form on 1st attempt ( Evaluation: several attempts)   3.Gait (L ref. Limb) w/SPC: speed: 1.8 ft./sec.   Quality: improved swing phase; still p/w past retract still WBOS (Evaluation: poor quality w/swing and stance phase of gait). Note: poor shoulder mechanics    Recommendations:  Pt. Has an I HEP s/he is following.  Recommend further skilled therapy to reach all goals. HEP alone  cannot help pt. work on problems.   Note: Per recent visit w/PCP: pt.s knee condition is worsening/destabilizing PT will focus on knee joint preservation and shoulder rehab will be primary focus.        Goals:   Short Term Goals:   Pt. To be I with and follow a HEP of shoulder exercises and hip strengthening exercises   Gait (L ref. Limb) w/SPC RUE: Quality: improved the use of R scapula stabilizers for proper shoulder mechanics using he AD to walk.    Short term goal time span:  2-4 weeks      Long Term Goals:    Pt. To be able to do sit>stands w/SPC 10 x  Pt. Able to lift R UE to reach [facility] and to 1st shelf of cabinet.        Long term goal time span:  6-8 weeks    Plan:   Planned therapy interventions:  Therapeutic Exercise (CPT 04062) and Self Care ADL Training (CPT 03311)  Frequency:  1x week  Duration in visits:  5  Plan details:  [phone]; [id number]      Referring provider co-signature:  I have reviewed this plan of care and my co-signature certifies the need for services.     Certification Period: 08/01/2023 to 09/30/23    Physician Signature: ________________________________ Date: ______________

## 2023-08-04 ENCOUNTER — OFFICE VISIT (OUTPATIENT)
Dept: PHYSICAL MEDICINE AND REHAB | Facility: MEDICAL CENTER | Age: 63
End: 2023-08-04
Payer: MEDICAID

## 2023-08-04 DIAGNOSIS — Z91.199 NO-SHOW FOR APPOINTMENT: ICD-10-CM

## 2023-08-04 PROCEDURE — 99999 PR NO CHARGE: CPT | Performed by: STUDENT IN AN ORGANIZED HEALTH CARE EDUCATION/TRAINING PROGRAM

## 2023-08-08 ENCOUNTER — APPOINTMENT (OUTPATIENT)
Dept: PHYSICAL THERAPY | Facility: REHABILITATION | Age: 63
End: 2023-08-08
Attending: NURSE PRACTITIONER
Payer: MEDICAID

## 2023-08-16 ENCOUNTER — TELEPHONE (OUTPATIENT)
Dept: INFECTIOUS DISEASES | Facility: MEDICAL CENTER | Age: 63
End: 2023-08-16
Payer: MEDICAID

## 2023-08-16 NOTE — TELEPHONE ENCOUNTER
Patient left message requesting refill for abx. I called patient and left a message current abx rx has enough refills until 12/28/23 asked patient to reach out to pharmacy. I also called [facility] pharmacy and they confirmed patient has 4 refills available and they will start processing this refill.

## 2023-10-04 ASSESSMENT — ENCOUNTER SYMPTOMS
HEADACHES: 0
FOCAL WEAKNESS: 0
PALPITATIONS: 0
DOUBLE VISION: 0
ABDOMINAL PAIN: 0
BLURRED VISION: 0
BRUISES/BLEEDS EASILY: 0
SPUTUM PRODUCTION: 0
CHILLS: 0
WHEEZING: 0
NAUSEA: 0
DIZZINESS: 0
SHORTNESS OF BREATH: 0
WEAKNESS: 0
FEVER: 0
WEIGHT LOSS: 0
COUGH: 0
NERVOUS/ANXIOUS: 0
MYALGIAS: 0
VOMITING: 0

## 2023-10-05 ENCOUNTER — OFFICE VISIT (OUTPATIENT)
Dept: INFECTIOUS DISEASES | Facility: MEDICAL CENTER | Age: 63
End: 2023-10-05
Attending: NURSE PRACTITIONER
Payer: MEDICAID

## 2023-10-05 ENCOUNTER — HOSPITAL ENCOUNTER (OUTPATIENT)
Dept: LAB | Facility: MEDICAL CENTER | Age: 63
End: 2023-10-05
Attending: NURSE PRACTITIONER
Payer: MEDICAID

## 2023-10-05 VITALS
BODY MASS INDEX: 28.57 KG/M2 | DIASTOLIC BLOOD PRESSURE: 90 MMHG | SYSTOLIC BLOOD PRESSURE: 130 MMHG | HEART RATE: 84 BPM | WEIGHT: 188.49 LBS | TEMPERATURE: 97.4 F | OXYGEN SATURATION: 97 % | HEIGHT: 68 IN

## 2023-10-05 DIAGNOSIS — Z96.659 INFECTION OF PROSTHETIC KNEE JOINT, SUBSEQUENT ENCOUNTER: ICD-10-CM

## 2023-10-05 DIAGNOSIS — T84.59XD INFECTION OF PROSTHETIC KNEE JOINT, SUBSEQUENT ENCOUNTER: ICD-10-CM

## 2023-10-05 DIAGNOSIS — E11.9 TYPE 2 DIABETES MELLITUS WITHOUT COMPLICATION, WITHOUT LONG-TERM CURRENT USE OF INSULIN (HCC): ICD-10-CM

## 2023-10-05 DIAGNOSIS — M00.262 STREPTOCOCCAL ARTHRITIS OF LEFT KNEE (HCC): ICD-10-CM

## 2023-10-05 LAB
ALBUMIN SERPL BCP-MCNC: 3.4 G/DL (ref 3.2–4.9)
ALBUMIN/GLOB SERPL: 1 G/DL
ALP SERPL-CCNC: 172 U/L (ref 30–99)
ALT SERPL-CCNC: 15 U/L (ref 2–50)
ANION GAP SERPL CALC-SCNC: 8 MMOL/L (ref 7–16)
AST SERPL-CCNC: 26 U/L (ref 12–45)
BASOPHILS # BLD AUTO: 1.2 % (ref 0–1.8)
BASOPHILS # BLD: 0.04 K/UL (ref 0–0.12)
BILIRUB SERPL-MCNC: 1.1 MG/DL (ref 0.1–1.5)
BUN SERPL-MCNC: 9 MG/DL (ref 8–22)
CALCIUM ALBUM COR SERPL-MCNC: 9.4 MG/DL (ref 8.5–10.5)
CALCIUM SERPL-MCNC: 8.9 MG/DL (ref 8.5–10.5)
CHLORIDE SERPL-SCNC: 112 MMOL/L (ref 96–112)
CO2 SERPL-SCNC: 23 MMOL/L (ref 20–33)
CREAT SERPL-MCNC: 0.55 MG/DL (ref 0.5–1.4)
CRP SERPL HS-MCNC: <0.3 MG/DL (ref 0–0.75)
EOSINOPHIL # BLD AUTO: 0.07 K/UL (ref 0–0.51)
EOSINOPHIL NFR BLD: 2.1 % (ref 0–6.9)
ERYTHROCYTE [DISTWIDTH] IN BLOOD BY AUTOMATED COUNT: 54.6 FL (ref 35.9–50)
ERYTHROCYTE [SEDIMENTATION RATE] IN BLOOD BY WESTERGREN METHOD: 20 MM/HOUR (ref 0–25)
GFR SERPLBLD CREATININE-BSD FMLA CKD-EPI: 103 ML/MIN/1.73 M 2
GLOBULIN SER CALC-MCNC: 3.3 G/DL (ref 1.9–3.5)
GLUCOSE SERPL-MCNC: 146 MG/DL (ref 65–99)
HCT VFR BLD AUTO: 34.3 % (ref 37–47)
HGB BLD-MCNC: 10.9 G/DL (ref 12–16)
IMM GRANULOCYTES # BLD AUTO: 0.01 K/UL (ref 0–0.11)
IMM GRANULOCYTES NFR BLD AUTO: 0.3 % (ref 0–0.9)
LYMPHOCYTES # BLD AUTO: 0.89 K/UL (ref 1–4.8)
LYMPHOCYTES NFR BLD: 26.4 % (ref 22–41)
MCH RBC QN AUTO: 29 PG (ref 27–33)
MCHC RBC AUTO-ENTMCNC: 31.8 G/DL (ref 32.2–35.5)
MCV RBC AUTO: 91.2 FL (ref 81.4–97.8)
MONOCYTES # BLD AUTO: 0.23 K/UL (ref 0–0.85)
MONOCYTES NFR BLD AUTO: 6.8 % (ref 0–13.4)
NEUTROPHILS # BLD AUTO: 2.13 K/UL (ref 1.82–7.42)
NEUTROPHILS NFR BLD: 63.2 % (ref 44–72)
NRBC # BLD AUTO: 0 K/UL
NRBC BLD-RTO: 0 /100 WBC (ref 0–0.2)
PLATELET # BLD AUTO: 76 K/UL (ref 164–446)
PLATELETS.RETICULATED NFR BLD AUTO: 2.2 % (ref 0.6–13.1)
PMV BLD AUTO: 10.5 FL (ref 9–12.9)
POTASSIUM SERPL-SCNC: 3.8 MMOL/L (ref 3.6–5.5)
PROT SERPL-MCNC: 6.7 G/DL (ref 6–8.2)
RBC # BLD AUTO: 3.76 M/UL (ref 4.2–5.4)
SODIUM SERPL-SCNC: 143 MMOL/L (ref 135–145)
WBC # BLD AUTO: 3.4 K/UL (ref 4.8–10.8)

## 2023-10-05 PROCEDURE — 36415 COLL VENOUS BLD VENIPUNCTURE: CPT

## 2023-10-05 PROCEDURE — 3080F DIAST BP >= 90 MM HG: CPT | Performed by: NURSE PRACTITIONER

## 2023-10-05 PROCEDURE — 3075F SYST BP GE 130 - 139MM HG: CPT | Performed by: NURSE PRACTITIONER

## 2023-10-05 PROCEDURE — 85025 COMPLETE CBC W/AUTO DIFF WBC: CPT

## 2023-10-05 PROCEDURE — 85652 RBC SED RATE AUTOMATED: CPT

## 2023-10-05 PROCEDURE — 85055 RETICULATED PLATELET ASSAY: CPT

## 2023-10-05 PROCEDURE — 80053 COMPREHEN METABOLIC PANEL: CPT

## 2023-10-05 PROCEDURE — 99213 OFFICE O/P EST LOW 20 MIN: CPT | Performed by: NURSE PRACTITIONER

## 2023-10-05 PROCEDURE — 86140 C-REACTIVE PROTEIN: CPT

## 2023-10-05 RX ORDER — LOSARTAN POTASSIUM 100 MG/1
TABLET ORAL
COMMUNITY
Start: 2023-09-18 | End: 2024-01-02

## 2023-10-05 RX ORDER — AMOXICILLIN AND CLAVULANATE POTASSIUM 875; 125 MG/1; MG/1
1 TABLET, FILM COATED ORAL 2 TIMES DAILY
Qty: 114 TABLET | Refills: 0 | Status: ON HOLD | OUTPATIENT
Start: 2023-10-05 | End: 2023-11-03

## 2023-10-05 ASSESSMENT — FIBROSIS 4 INDEX: FIB4 SCORE: 3

## 2023-10-05 NOTE — PROGRESS NOTES
INFECTIOUS  DISEASE  OUTPATIENT CLINIC  NOTE   Subjective   Primary care provider:   Anum Gatica M.D.      Reason for Follow Up:   Follow-up for   1. Infection of prosthetic knee joint, subsequent encounter  amoxicillin-clavulanate (AUGMENTIN) 875-125 MG Tab    CBC WITH DIFFERENTIAL    Comp Metabolic Panel    Sed Rate    CRP QUANTITIVE (NON-CARDIAC)      2. Streptococcal arthritis of left knee (HCC)        3. Type 2 diabetes mellitus without complication, without long-term current use of insulin (HCC)            HPI: Patient previously seen and treated by ID team as inpatient during hospital admission.   April Alicia is a 62 y.o. female with a history of alcohol and cocaine abuse, cirrhosis, asthma, A-fib on apixaban, 2 diabetes, hypertension. Patient was admitted on July 2022 with a group B strep bacteremia and left knee prosthetic joint infection along with a left shoulder septic arthritis and some subdeltoid abscess.  Plan was for her to discharge to a skilled nursing facility and continue IV ceftriaxone for 6 weeks until 8/30/2022 due to some unknown reason patient only received 4 weeks of antibiotics.  Patient was readmitted on 12/26/2022 due to recurrent left knee pain and swelling.  She was taken back to the OR on 12/28/22 underwent explantation and placement of articulating spacer, wound VAC.  Multiple OR cultures again growing group B Strep.   Underwent synovial fluid removal from the left shoulder on 1/2/2022 WBC 1051, 40% polys, 40% lymphs, not consistent with infection.  Plan at discharge with 6 weeks of IV antibiotics ceftriaxone 2 g daily till 2/7/2023    2/15-Orthopedic MD Garcia note recommending patient continue oral antibiotics for at least 1 year prior to reimplantation as she is undergone 2 I&D's in the past with 2 antibiotic spacers which were both failed.  She is at high risk for failing stage II reimplantation and will be subject to amputation if failed again.  We will continue  with oral Augmentin 500/ 125 mg twice daily for an additional year    02/16/23- Today Patient reports feeling well. Pt stating that the wound is healing well. Pt being followed by the [facility]. Wound pictures reviewed in EPIC. Denies drainage, pungent odor, redness, pain. Denies feeling generally ill, fevers/chills, general malaise, headache, n/v/d.     3/15/23- Today Patient reports feeling  good. Denies feeling generally ill, fevers/chills, general malaise, headache, n/v/d.  Patient states that she has not taken her Augmentin for the last 3 days as the care facility she was discharged from did not give her a supply.  Medication reordered and to be picked up at her pharmacy.  Tolerating the Augmentin with no complaints of side effects.     4/18/23-patient comes in today with complaints of not feeling well. She reports increasing fatigue, trouble staying awake, and decreasing appetite. Her symptoms started about 2 weeks ago when started to notice increased swelling of her left knee with warmth despite treatment with chronic dosing of Augmentin.  I am concerned that she may have possibly failed outpatient treatment and needs additional services to rule out worsening infection.  Patient escorted to ER    5/16/23- Previously admitted into the ER for further evaluation. Repeat CT  4/19 Previous left total knee replacement and fusion. No evidence of periprosthetic fracture or loosening. No significant joint effusion or soft tissue gas. 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  Plan to transition back to p.o. Augmentin 875/125 mg twice daily due to increased bioavailability.  Most recent labs reviewed from 4/22/2023, chronic leukopenia 3.8 stable, chronic anemia, hypoalbuminemia 2.5.  Patient continues to have complaints of chronic fatigue, left knee warmth, and bouts of nausea.  HIV screening, repeat CBC/CMP.  Recommended close  follow-up with PCP for further evaluation of chronic fatigue and leukopenia.  Previous HIV screening negative 2021 6/1/23- repeat HIV screening negative.  Previous labs from 5/16/2023 reviewed, WBC WNL 4.8, thrombocytopenia stable 96, elevated alk phos level 277 stable compared to previous labs and consistent with history of alcohol cirrhosis.  Continues to tolerate p.o. Augmentin without any complaints of side effects.  Left knee with continued swelling but no erythema, drainage or worsening pain.  Denies any recent fevers, chills, nausea, vomiting, constipation or diarrhea but does complain of chronic fatigue.  Continues to follow-up with cardiology, no longer in A-fib and weaning off Multaq and recently started on metoprolol for PVCs/PACs.      7/6/23-patient reports that she is feeling okay.  She does still have complaints of decreased range of motion in her left knee.  She recently started PT is working on increasing mobility.  She is tolerating the p.o. Augmentin with no complaints of side effects.  She denies any fever, chills, nausea, vomiting, constipation or diarrhea.  Most recent labs reviewed from 5/16/2023 as discussed above.  She has not completed her repeat labs.  Continue with chronic dosing of Augmentin until stop date of 12/28/2023    10/5/23-patient continue to follow-up while on p.o. Augmentin due to infected arthroplasty of the left knee.  She continues to tolerate p.o. Augmentin with no complaints of side effects.  She denies any nausea, vomiting, fever, chills, constipation or diarrhea.  Continues to go to outpatient physical therapy on a regular basis to increase range of motion of left knee.  Most recent labs reviewed from 7/6/2023, WBC WNL 5.2, stable anemia 11.3/33.7, neutrophils WNL, CMP mostly unremarkable LFTs WNL, creatinine and GFR WNL.  Repeat labs today and again in 1 month for continuous monitoring.  Continue with p.o. Augmentin until original end date of 12/28/2023.  1 year of  total antibiotic suppression/treatment.  Reports that she continues to have left knee pain despite physical therapy and antibiotic therapy.  She is scheduled to follow-up with orthopedic surgery for planning of possible joint aspiration.  Recommend she complete antibiotic therapy on 12/1/2023 and be off antibiotic therapy for at least 14 days before joint aspiration.  Labs ordered for today CBC/CMP/sed rate/CRP    Current Antimicrobials: Augmentin 875/125 mg twice daily, stop date 12/1/23  Previous Antimicrobials: Unasyn, ceftriaxone, Ancef, cefazolin, Augmentin    Other Current Medications:       PMH:  Past Medical History:   Diagnosis Date    Arrhythmia     Arthritis     OA in knees    ASTHMA     Blood clotting disorder (HCC) 2018    right leg blood clot    Dental disorder     upper and lower dentures    Diabetes     Emphysema of lung ([facility])     Heart abnormality     leakage in left valve    Heart burn     left knee    Heart valve disease     Hypertension     Infection 9/4/2017    Infection 2018    Right knee after total knee    Liver disease     Pneumonia 02/2020    Seizure disorder ([facility])      Past Surgical History:   Procedure Laterality Date    PB REVISE KNEE JOINT REPLACE,ALL PARTS Left 12/28/2022    Procedure: REVISION, TOTAL ARTHROPLASTY, KNEE, ALL COMPONENTS-LEFT;  Surgeon: Wild Luz M.D.;  Location: [facility];  Service: Orthopedics    IRRIGATION & DEBRIDEMENT GENERAL Left 12/28/2022    Procedure: IRRIGATION AND DEBRIDEMENT, WOUND;  Surgeon: Wild Luz M.D.;  Location: [facility];  Service: Orthopedics    PB REVISE KNEE JOINT REPLACE,ALL PARTS Left 7/19/2022    Procedure: REVISION, TOTAL ARTHROPLASTY, KNEE, ALL COMPONENTS - ANTIBIOTIC SPACER;  Surgeon: Wild Luz M.D.;  Location: [facility];  Service: Orthopedics    IRRIGATION & DEBRIDEMENT GENERAL Left 7/17/2022    Procedure: IRRIGATION AND DEBRIDEMENT, SHOULDER;  Surgeon: Obdulio Gray M.D.;  Location:  SURGERY [facility];  Service: Orthopedics    PB TOTAL KNEE ARTHROPLASTY Left 2020    Procedure: ARTHROPLASTY, KNEE, TOTAL;  Surgeon: Víctor Faustin M.D.;  Location: SURGERY [facility];  Service: Orthopedics    KNEE ARTHROPLASTY TOTAL Right 2018    IRRIGATION & DEBRIDEMENT ORTHO Right 9/3/2017    Procedure: IRRIGATION & DEBRIDEMENT ORTHO, POLY EXCHANGE;  Surgeon: Jerome Russell M.D.;  Location: SURGERY [facility];  Service: Orthopedics    COLONOSCOPY WITH CLIPPING  10/28/2015    Procedure: COLONOSCOPY WITH CLIPPING;  Surgeon: Ruben Colon M.D.;  Location: ENDOSCOPY [facility];  Service:     COLONOSCOPY WITH SCLEROTHERAPY  10/28/2015    Procedure: COLONOSCOPY WITH SCLEROTHERAPY;  Surgeon: Ruben Colon M.D.;  Location: ENDOSCOPY [facility];  Service:     COLONOSCOPY WITH TATTOOING  10/28/2015    Procedure: COLONOSCOPY WITH TATTOOING;  Surgeon: Ruben Colon M.D.;  Location: ENDOSCOPY [facility];  Service:     GYN SURGERY      hysteroscoopy    GYN SURGERY      tubal ligation     History reviewed. No pertinent family history.  Social History     Socioeconomic History    Marital status: Single     Spouse name: Not on file    Number of children: Not on file    Years of education: Not on file    Highest education level: Not on file   Occupational History    Not on file   Tobacco Use    Smoking status: Former     Current packs/day: 0.00     Types: Cigarettes     Quit date: 2016     Years since quittin.7    Smokeless tobacco: Former     Quit date: 2021    Tobacco comments:     a few a day when I can   Vaping Use    Vaping Use: Never used   Substance and Sexual Activity    Alcohol use: Not Currently    Drug use: Not Currently    Sexual activity: Not on file   Other Topics Concern    Not on file   Social History Narrative    Not on file     Social Determinants of Health     Financial Resource Strain: Low Risk  (2020)    Overall Financial Resource  "Strain (CARDIA)     Difficulty of Paying Living Expenses: Not hard at all   Food Insecurity: No Food Insecurity (1/28/2020)    Hunger Vital Sign     Worried About Running Out of Food in the Last Year: Never true     Ran Out of Food in the Last Year: Never true   Transportation Needs: No Transportation Needs (1/28/2020)    PRAPARE - Transportation     Lack of Transportation (Medical): No     Lack of Transportation (Non-Medical): No   Physical Activity: Not on file   Stress: Not on file   Social Connections: Not on file   Intimate Partner Violence: Not on file   Housing Stability: Not on file           Allergies/Intolerances:  Allergies   Allergen Reactions    Nkda [No Known Drug Allergy]        ROS:   Review of Systems   Constitutional:  Positive for malaise/fatigue. Negative for chills, fever and weight loss.   HENT:  Negative for congestion and hearing loss.    Eyes:  Negative for blurred vision and double vision.   Respiratory:  Negative for cough, sputum production, shortness of breath and wheezing.    Cardiovascular:  Negative for chest pain and palpitations.   Gastrointestinal:  Negative for abdominal pain, nausea and vomiting.   Genitourinary:  Negative for dysuria.   Musculoskeletal:  Positive for joint pain (Chronic left shoulder pain). Negative for myalgias.        Bilateral knees warmth   Skin:  Negative for itching and rash.   Neurological:  Negative for dizziness, focal weakness, weakness and headaches.   Endo/Heme/Allergies:  Does not bruise/bleed easily.   Psychiatric/Behavioral:  The patient is not nervous/anxious.       ROS was reviewed and were negative except as above.    Objective    Most Recent Vital Signs:  Blood Pressure (Abnormal) 130/90 (BP Location: Right arm, Patient Position: Sitting, BP Cuff Size: Adult)   Pulse 84   Temperature 36.3 °C (97.4 °F) (Temporal)   Height 1.727 m (5' 8\")   Weight 85.5 kg (188 lb 7.9 oz)   Last Menstrual Period 06/02/2008   Oxygen Saturation 97%   Body " Mass Index 28.66 kg/m²     Physical Exam:  Physical Exam  Vitals and nursing note reviewed.   Constitutional:       General: She is not in acute distress.     Appearance: Normal appearance.      Comments: Walker   HENT:      Head: Normocephalic and atraumatic.      Nose: Nose normal.      Mouth/Throat:      Mouth: Mucous membranes are moist.   Eyes:      Pupils: Pupils are equal, round, and reactive to light.   Cardiovascular:      Rate and Rhythm: Normal rate and regular rhythm.   Pulmonary:      Effort: Pulmonary effort is normal. No respiratory distress.      Breath sounds: Normal breath sounds. No stridor.   Abdominal:      General: There is no distension.      Palpations: Abdomen is soft.   Musculoskeletal:         General: Tenderness present. No swelling.      Cervical back: Normal range of motion.      Comments: Bilateral knees surgical scars with no erythema, swelling or drainage.  Increased warmth of bilateral knees.   Skin:     General: Skin is warm and dry.      Coloration: Skin is not jaundiced or pale.   Neurological:      General: No focal deficit present.      Mental Status: She is oriented to person, place, and time.      Cranial Nerves: No cranial nerve deficit.      Sensory: No sensory deficit.   Psychiatric:         Mood and Affect: Mood normal.         Behavior: Behavior normal.          Pertinent Lab/Imaging Results:  [unfilled]  @CMP@  WBC   Date/Time Value Ref Range Status   07/06/2023 01:31 PM 5.2 4.8 - 10.8 K/uL Final     RBC   Date/Time Value Ref Range Status   07/06/2023 01:31 PM 3.81 (L) 4.20 - 5.40 M/uL Final     Hemoglobin   Date/Time Value Ref Range Status   07/06/2023 01:31 PM 11.3 (L) 12.0 - 16.0 g/dL Final     Hematocrit   Date/Time Value Ref Range Status   07/06/2023 01:31 PM 33.7 (L) 37.0 - 47.0 % Final     MCV   Date/Time Value Ref Range Status   07/06/2023 01:31 PM 88.5 81.4 - 97.8 fL Final     MCH   Date/Time Value Ref Range Status   07/06/2023 01:31 PM 29.7 27.0 - 33.0 pg Final      MCHC   Date/Time Value Ref Range Status   07/06/2023 01:31 PM 33.5 32.2 - 35.5 g/dL Final     Comment:     Please note new reference range effective 05/22/2023.     MPV   Date/Time Value Ref Range Status   07/06/2023 01:31 PM 10.8 9.0 - 12.9 fL Final      Sodium   Date/Time Value Ref Range Status   07/06/2023 01:31  135 - 145 mmol/L Final     Potassium   Date/Time Value Ref Range Status   07/06/2023 01:31 PM 3.7 3.6 - 5.5 mmol/L Final     Chloride   Date/Time Value Ref Range Status   07/06/2023 01:31  96 - 112 mmol/L Final     Co2   Date/Time Value Ref Range Status   07/06/2023 01:31 PM 21 20 - 33 mmol/L Final     Glucose   Date/Time Value Ref Range Status   07/06/2023 01:31  (H) 65 - 99 mg/dL Final     Bun   Date/Time Value Ref Range Status   07/06/2023 01:31 PM 13 8 - 22 mg/dL Final     Creatinine   Date/Time Value Ref Range Status   07/06/2023 01:31 PM 0.82 0.50 - 1.40 mg/dL Final   03/12/2009 06:50 PM 0.9 0.5 - 1.4 mg/dL Final     Bun-Creatinine Ratio   Date/Time Value Ref Range Status   03/08/2022 10:02 PM 12.0  Final     Alkaline Phosphatase   Date/Time Value Ref Range Status   07/06/2023 01:31  (H) 30 - 99 U/L Final     AST(SGOT)   Date/Time Value Ref Range Status   07/06/2023 01:31 PM 24 12 - 45 U/L Final     ALT(SGPT)   Date/Time Value Ref Range Status   07/06/2023 01:31 PM 16 2 - 50 U/L Final     Total Bilirubin   Date/Time Value Ref Range Status   07/06/2023 01:31 PM 1.0 0.1 - 1.5 mg/dL Final      CPK Total   Date/Time Value Ref Range Status   08/04/2022 03:17  (H) 0 - 154 U/L Final          Blood Culture   Date Value Ref Range Status   09/04/2017 No growth after 5 days of incubation.  Final     Blood Culture Hold   Date Value Ref Range Status   10/03/2020 Collected  Final       Blood Culture   Date Value Ref Range Status   09/04/2017 No growth after 5 days of incubation.  Final     Blood Culture Hold   Date Value Ref Range Status   10/03/2020 Collected  Final     Micro:  Results         Procedure Component Value Units Date/Time     COV-2, FLU A/B, AND RSV BY PCR (2-4 HOURS [facility]): Collect NP swab in VTM [[id number]] Collected: 01/05/23 1531     Order Status: Completed Specimen: Respirate from Nasopharyngeal Updated: 01/05/23 1629       Influenza virus A RNA Negative       Influenza virus B, PCR Negative       RSV, PCR Negative       SARS-CoV-2 by PCR NotDetected       Comment: [facility] providers: PLEASE REFER TO DE-ESCALATION AND RETESTING PROTOCOL  on [email or web address]     **The Cuffed and Wanted GeneXpert Xpress SARS-CoV-2 RT-PCR Test has been made  available for use under the Emergency Use Authorization (EUA) only.             SARS-CoV-2 Source NP Swab     Narrative:       Collected By: [id number] JUAN CARLOS RAMOS     URINALYSIS [[id number]]  (Abnormal) Collected: 01/05/23 1515     Order Status: Completed Specimen: Urine, Clean Catch Updated: 01/05/23 1603       Color Yellow       Character Clear       Specific Gravity 1.014       Ph 6.5       Glucose Negative mg/dL         Ketones Negative mg/dL         Protein Negative mg/dL         Bilirubin Negative       Urobilinogen, Urine 0.2       Nitrite Negative       Leukocyte Esterase Negative       Occult Blood Small       Micro Urine Req Microscopic     Narrative:       Collected By: [id number] JUAN CARLOS RAMOS     FLUID CULTURE W/GRAM STAIN [[id number]] Collected: 01/02/23 1545     Order Status: Completed Specimen: Synovial Updated: 01/05/23 0945       Significant Indicator NEG       Source SYNO       Site Left Shoulder       Culture Result No growth at 72 hours.       Gram Stain Result Rare WBCs.  No organisms seen.        BLOOD CULTURE [[id number]] Collected: 01/03/23 0842     Order Status: Completed Specimen: Blood from Peripheral Updated: 01/04/23 0728       Significant Indicator NEG       Source BLD       Site PERIPHERAL       Culture Result No Growth  Note: Blood cultures are incubated for 5 days and  are monitored  "continuously.Positive blood cultures  are called to the RN and reported as soon as  they are identified.        Narrative:       Per Hospital Policy: Only change Specimen Src: to \"Line\" if  specified by physician order.  Right Hand     BLOOD CULTURE [[id number]] Collected: 01/03/23 0842     Order Status: Completed Specimen: Blood from Peripheral Updated: 01/04/23 0728       Significant Indicator NEG       Source BLD       Site PERIPHERAL       Culture Result No Growth  Note: Blood cultures are incubated for 5 days and  are monitored continuously.Positive blood cultures  are called to the RN and reported as soon as  they are identified.        Narrative:       Per Hospital Policy: Only change Specimen Src: to \"Line\" if  specified by physician order.  Left Forearm/Arm     URINALYSIS [[id number]]  (Abnormal) Collected: 01/03/23 1952     Order Status: Completed Specimen: Urine, Cath Updated: 01/03/23 2013       Color Yellow       Character Clear       Specific Gravity 1.020       Ph 5.5       Glucose Negative mg/dL         Ketones Negative mg/dL         Protein Negative mg/dL         Bilirubin Negative       Urobilinogen, Urine 0.2       Nitrite Negative       Leukocyte Esterase Negative       Occult Blood Small       Micro Urine Req Microscopic     Narrative:       Collected By: [id number] CECI BRANDT     GRAM STAIN [[id number]] Collected: 01/02/23 1545     Order Status: Completed Specimen: Synovial Updated: 01/02/23 1836       Significant Indicator .       Source SYNO       Site Left Shoulder       Gram Stain Result Rare WBCs.  No organisms seen.        FLUID CULTURE W/GRAM STAIN [[id number]]       Order Status: No result Specimen: Body Fluid from Synovial Fluid       Blood Culture [[id number]] Collected: 12/26/22 2202     Order Status: Completed Specimen: Blood from Peripheral Updated: 12/31/22 2300       Significant Indicator NEG       Source BLD       Site PERIPHERAL       Culture Result No growth after 5 days of " "incubation.     Narrative:       1 of 2 for Blood Culture x 2 sites order. Per Hospital  Policy: Only change Specimen Src: to \"Line\" if specified by  physician order.  No site indicated     Blood Culture [[id number]] Collected: 12/26/22 2225     Order Status: Completed Specimen: Blood from Peripheral Updated: 12/31/22 2300       Significant Indicator NEG       Source BLD       Site PERIPHERAL       Culture Result No growth after 5 days of incubation.     Narrative:       2 of 2 blood culture x2  Sites order. Per Hospital Policy:  Only change Specimen Src: to \"Line\" if specified by physician  order.  Left Hand     CULTURE TISSUE W/ GRM STAIN [[id number]]  (Abnormal) Collected: 12/28/22 1351     Order Status: Completed Specimen: Tissue Updated: 12/31/22 1205       Significant Indicator POS       Source TISS       Site Left anterior tibia       Culture Result -       Gram Stain Result Rare WBCs.  No organisms seen.          Culture Result Streptococcus agalactiae (Group B)  Light growth       Narrative:       Surgery Specimen     Anaerobic Culture [[id number]] Collected: 12/28/22 1351     Order Status: Completed Specimen: Tissue Updated: 12/31/22 1205       Significant Indicator NEG       Source TISS       Site Left anterior tibia       Culture Result No Anaerobes isolated.     Narrative:       Surgery Specimen     CULTURE TISSUE W/ GRM STAIN [[id number]]  (Abnormal) Collected: 12/28/22 1350     Order Status: Completed Specimen: Tissue Updated: 12/31/22 1201       Significant Indicator POS       Source TISS       Site Left knee lateral gutter       Culture Result -       Gram Stain Result Many WBCs.  No organisms seen.          Culture Result Streptococcus agalactiae (Group B)  Light growth       Narrative:       Surgery Specimen     Anaerobic Culture [[id number]] Collected: 12/28/22 1350     Order Status: Completed Specimen: Tissue Updated: 12/31/22 1201       Significant Indicator NEG       Source TISS       Site Left " knee lateral gutter       Culture Result No Anaerobes isolated.     Narrative:       Surgery Specimen     CULTURE TISSUE W/ GRM STAIN [[id number]]  (Abnormal) Collected: 12/28/22 1432     Order Status: Completed Specimen: Tissue Updated: 12/31/22 1200       Significant Indicator POS       Source TISS       Site Left femur       Culture Result -       Gram Stain Result Rare WBCs.  No organisms seen.          Culture Result Streptococcus agalactiae (Group B)  Light growth       Narrative:       Surgery Specimen     Anaerobic Culture [[id number]] Collected: 12/28/22 1432     Order Status: Completed Specimen: Tissue Updated: 12/31/22 1200       Significant Indicator NEG       Source TISS       Site Left femur       Culture Result No Anaerobes isolated.     Narrative:       Surgery Specimen     CULTURE TISSUE W/ GRM STAIN [[id number]]  (Abnormal) Collected: 12/28/22 1433     Order Status: Completed Specimen: Tissue Updated: 12/31/22 1159       Significant Indicator POS       Source TISS       Site Left Tibia       Culture Result -       Gram Stain Result Many WBCs.  No organisms seen.          Culture Result Streptococcus agalactiae (Group B)  Light growth       Narrative:       Surgery Specimen     Anaerobic Culture [[id number]] Collected: 12/28/22 1433     Order Status: Completed Specimen: Tissue Updated: 12/31/22 1159       Significant Indicator NEG       Source TISS       Site Left Tibia       Culture Result No Anaerobes isolated.     Narrative:       Surgery Specimen     CULTURE TISSUE W/ GRM STAIN [[id number]]  (Abnormal) Collected: 12/28/22 1349     Order Status: Completed Specimen: Tissue Updated: 12/31/22 1159       Significant Indicator POS       Source TISS       Site Left Knee Medial Gutter       Culture Result -       Gram Stain Result Rare WBCs.  No organisms seen.          Culture Result Streptococcus agalactiae (Group B)  Light growth       Narrative:       Surgery Specimen     Anaerobic Culture [[id number]]  Collected: 12/28/22 1349     Order Status: Completed Specimen: Tissue Updated: 12/31/22 1152       Significant Indicator NEG       Source TISS       Site Left Knee Medial Gutter       Culture Result No Anaerobes isolated.     Narrative:       Surgery Specimen     FLUID CULTURE W/GRAM STAIN  Order: [id number]  Status: Final result     Visible to patient: No (inaccessible in MyChart)     Next appt: Today at 11:00 AM in Infectious Diseases (Mateus Shell A.P.R.N.)     Specimen Information: Synovial Fluid   0 Result Notes      Component 1 mo ago   Significant Indicator POS Positive (POS)    Source SYNO    Site Left Knee    Culture Result - Abnormal     Gram Stain Result  Abnormal   Many WBCs.   Rare Gram positive cocci.     Culture Result  Abnormal   Streptococcus agalactiae (Group B)   Light growth   This isolate does NOT demonstrate inducible Clindamycin   resistance in vitro.     Resulting Agency M        Susceptibility     Streptococcus agalactiae (group b)     [facility]     Clindamycin <=0.25 mcg/mL Sensitive     Daptomycin <=0.5 mcg/mL Sensitive     Penicillin <=0.03 mcg/mL Sensitive                       Impression/Assessment      1. Infection of prosthetic knee joint, subsequent encounter  amoxicillin-clavulanate (AUGMENTIN) 875-125 MG Tab    CBC WITH DIFFERENTIAL    Comp Metabolic Panel    Sed Rate    CRP QUANTITIVE (NON-CARDIAC)      2. Streptococcal arthritis of left knee (HCC)        3. Type 2 diabetes mellitus without complication, without long-term current use of insulin (HCC)          April Alicia is a 62 y.o. female with a history of alcohol and cocaine abuse, cirrhosis, asthma, A-fib on apixaban, 2 diabetes, hypertension. Patient was admitted on July 2022 with a group B strep bacteremia and left knee prosthetic joint infection along with a left shoulder septic arthritis and some subdeltoid abscess.  Plan was for her to discharge to a skilled nursing facility and continue IV ceftriaxone for 6 weeks  until 8/30/2022 due to some unknown reason patient only received 4 weeks of antibiotics.  Patient was readmitted on 12/26/2022 due to recurrent left knee pain and swelling.  She was taken back to the OR on 12/28 underwent explantation and placement of articulating  spacer, wound VAC.  Multiple OR cultures again growing group B Strep.   Underwent synovial fluid removal from the left shoulder on 1/2/2022 WBC 1051, 40% polys, 40% lymphs, not consistent with infection.  Streptococcus group B (penicillin sensitive). Plan at discharge with 6 weeks of IV antibiotics ceftriaxone 2 g daily till 2/7/2023. Orthopedic MD Garcia note recommending patient continue oral antibiotics for at least 1 year prior to reimplantation as she is undergone 2 I&D's in the past with 2 antibiotic spacers which were both failed.  She is at high risk for failing stage II reimplantation and will be subject to amputation if failed again.      10/5/23-patient continue to follow-up while on p.o. Augmentin due to infected arthroplasty of the left knee.  She continues to tolerate p.o. Augmentin with no complaints of side effects.  She denies any nausea, vomiting, fever, chills, constipation or diarrhea.  Continues to go to outpatient physical therapy on a regular basis to increase range of motion of left knee.  Most recent labs reviewed from 7/6/2023, WBC WNL 5.2, stable anemia 11.3/33.7, neutrophils WNL, CMP mostly unremarkable LFTs WNL, creatinine and GFR WNL.  Repeat labs today and again in 1 month for continuous monitoring.  Continue with p.o. Augmentin until original end date of 12/28/2023.  1 year of total antibiotic suppression/treatment.  Reports that she continues to have left knee pain despite physical therapy and antibiotic therapy.  She is scheduled to follow-up with orthopedic surgery for planning of possible joint aspiration.  Recommend she complete antibiotic therapy on 12/1/2023 and be off antibiotic therapy for at least 14 days before  joint aspiration.  Labs ordered for today CBC/CMP/sed rate/CRP  PLAN:   -Continue Augmentin 875/125 mg twice daily, end date 12/1/2023.   -Medication education provided and side effects to monitor.   -Repeat CBC/CMP/SED/CRP today and in 1 month    -Continue routine follow-up with orthopedic surgery, cardiology, PT  -Recommend she complete antibiotic therapy on 12/1/2023 and be off antibiotic therapy for at least 14 days before joint aspiration  -Recommend to keep blood sugars below 140 to allow for adequate wound healing and prevent secondary infection.  Continue on atorvastatin 20 mg.  Diabetes managed through diet.  Last A1c 5.4 from 1/1/2023  -Education provided on worsening signs and symptoms of infection and when to report to ER/call 911    Return visit: 2 months. follow up with primary care physician for chronic medical problems      I have performed a physical exam,  updated ROS and plan today. I have reviewed previous images, labs, and provider notes.      EDITH Baez.    All Patients should seek medical re-evaluation or report to the ER for new, increasing or worsening symptoms. In some circumstances medical conditions can change from the initial evaluation and may require emergent medical re-evaluation. This includes but is not limited to chest pain, shortness of breath, atypical abdominal pain, atypical headache, ALOC, fever >101, low blood pressure, high respiratory rate (above 30), low oxygen saturation (below 90%), acute delirium, abnormal bleeding, inability to tolerate any intake, weakness on one side of the body, any worsened or concerning conditions.    Please note that this dictation was created using voice recognition software. I have worked with technical experts from [employer]  [facility] to optimize the interface.  I have made every reasonable attempt to correct obvious errors, but there may be errors of grammar and possibly content that I did not discover before finalizing the note.

## 2023-10-23 ENCOUNTER — APPOINTMENT (OUTPATIENT)
Dept: RADIOLOGY | Facility: MEDICAL CENTER | Age: 63
DRG: 467 | End: 2023-10-23
Attending: EMERGENCY MEDICINE
Payer: MEDICAID

## 2023-10-23 ENCOUNTER — HOSPITAL ENCOUNTER (INPATIENT)
Facility: MEDICAL CENTER | Age: 63
LOS: 11 days | DRG: 467 | End: 2023-11-03
Attending: EMERGENCY MEDICINE | Admitting: INTERNAL MEDICINE
Payer: MEDICAID

## 2023-10-23 DIAGNOSIS — A41.52: ICD-10-CM

## 2023-10-23 DIAGNOSIS — R65.21: ICD-10-CM

## 2023-10-23 DIAGNOSIS — N39.0 ACUTE URINARY TRACT INFECTION: ICD-10-CM

## 2023-10-23 DIAGNOSIS — M25.562 ACUTE PAIN OF LEFT KNEE: ICD-10-CM

## 2023-10-23 DIAGNOSIS — E87.6 HYPOKALEMIA: ICD-10-CM

## 2023-10-23 DIAGNOSIS — L03.116 CELLULITIS OF LEFT KNEE: ICD-10-CM

## 2023-10-23 DIAGNOSIS — M00.862 ARTHRITIS OF LEFT KNEE DUE TO OTHER BACTERIA (HCC): ICD-10-CM

## 2023-10-23 PROBLEM — M25.569 KNEE PAIN: Status: ACTIVE | Noted: 2023-10-23

## 2023-10-23 LAB
ALBUMIN SERPL BCP-MCNC: 2.9 G/DL (ref 3.2–4.9)
ALBUMIN/GLOB SERPL: 0.9 G/DL
ALP SERPL-CCNC: 118 U/L (ref 30–99)
ALT SERPL-CCNC: 11 U/L (ref 2–50)
ANION GAP SERPL CALC-SCNC: 10 MMOL/L (ref 7–16)
APPEARANCE UR: ABNORMAL
APTT PPP: 40.2 SEC (ref 24.7–36)
AST SERPL-CCNC: 18 U/L (ref 12–45)
BACTERIA #/AREA URNS HPF: NEGATIVE /HPF
BASOPHILS # BLD AUTO: 0.8 % (ref 0–1.8)
BASOPHILS # BLD: 0.05 K/UL (ref 0–0.12)
BILIRUB SERPL-MCNC: 1.8 MG/DL (ref 0.1–1.5)
BILIRUB UR QL STRIP.AUTO: ABNORMAL
BUN SERPL-MCNC: 18 MG/DL (ref 8–22)
CALCIUM ALBUM COR SERPL-MCNC: 8.8 MG/DL (ref 8.5–10.5)
CALCIUM SERPL-MCNC: 7.9 MG/DL (ref 8.5–10.5)
CHLORIDE SERPL-SCNC: 100 MMOL/L (ref 96–112)
CO2 SERPL-SCNC: 20 MMOL/L (ref 20–33)
COLOR UR: ABNORMAL
CREAT SERPL-MCNC: 1.01 MG/DL (ref 0.5–1.4)
CRP SERPL HS-MCNC: 10.03 MG/DL (ref 0–0.75)
EOSINOPHIL # BLD AUTO: 0.04 K/UL (ref 0–0.51)
EOSINOPHIL NFR BLD: 0.6 % (ref 0–6.9)
EPI CELLS #/AREA URNS HPF: ABNORMAL /HPF
ERYTHROCYTE [DISTWIDTH] IN BLOOD BY AUTOMATED COUNT: 49.4 FL (ref 35.9–50)
ERYTHROCYTE [SEDIMENTATION RATE] IN BLOOD BY WESTERGREN METHOD: 21 MM/HOUR (ref 0–25)
GFR SERPLBLD CREATININE-BSD FMLA CKD-EPI: 62 ML/MIN/1.73 M 2
GLOBULIN SER CALC-MCNC: 3.4 G/DL (ref 1.9–3.5)
GLUCOSE SERPL-MCNC: 155 MG/DL (ref 65–99)
GLUCOSE UR STRIP.AUTO-MCNC: NEGATIVE MG/DL
HCT VFR BLD AUTO: 36.1 % (ref 37–47)
HGB BLD-MCNC: 11.9 G/DL (ref 12–16)
HYALINE CASTS #/AREA URNS LPF: ABNORMAL /LPF
IMM GRANULOCYTES # BLD AUTO: 0.03 K/UL (ref 0–0.11)
IMM GRANULOCYTES NFR BLD AUTO: 0.5 % (ref 0–0.9)
INR PPP: 1.52 (ref 0.87–1.13)
KETONES UR STRIP.AUTO-MCNC: ABNORMAL MG/DL
LACTATE SERPL-SCNC: 1.3 MMOL/L (ref 0.5–2)
LEUKOCYTE ESTERASE UR QL STRIP.AUTO: ABNORMAL
LYMPHOCYTES # BLD AUTO: 0.99 K/UL (ref 1–4.8)
LYMPHOCYTES NFR BLD: 15.3 % (ref 22–41)
MCH RBC QN AUTO: 29.2 PG (ref 27–33)
MCHC RBC AUTO-ENTMCNC: 33 G/DL (ref 32.2–35.5)
MCV RBC AUTO: 88.5 FL (ref 81.4–97.8)
MICRO URNS: ABNORMAL
MONOCYTES # BLD AUTO: 0.73 K/UL (ref 0–0.85)
MONOCYTES NFR BLD AUTO: 11.3 % (ref 0–13.4)
NEUTROPHILS # BLD AUTO: 4.62 K/UL (ref 1.82–7.42)
NEUTROPHILS NFR BLD: 71.5 % (ref 44–72)
NITRITE UR QL STRIP.AUTO: NEGATIVE
NRBC # BLD AUTO: 0 K/UL
NRBC BLD-RTO: 0 /100 WBC (ref 0–0.2)
PH UR STRIP.AUTO: 5 [PH] (ref 5–8)
PLATELET # BLD AUTO: 82 K/UL (ref 164–446)
PLATELETS.RETICULATED NFR BLD AUTO: 2.6 % (ref 0.6–13.1)
PMV BLD AUTO: 10.4 FL (ref 9–12.9)
POTASSIUM SERPL-SCNC: 3.3 MMOL/L (ref 3.6–5.5)
PROT SERPL-MCNC: 6.3 G/DL (ref 6–8.2)
PROT UR QL STRIP: 100 MG/DL
PROTHROMBIN TIME: 18.5 SEC (ref 12–14.6)
RBC # BLD AUTO: 4.08 M/UL (ref 4.2–5.4)
RBC # URNS HPF: ABNORMAL /HPF
RBC UR QL AUTO: ABNORMAL
RENAL EPI CELLS #/AREA URNS HPF: ABNORMAL /HPF
SODIUM SERPL-SCNC: 130 MMOL/L (ref 135–145)
SP GR UR STRIP.AUTO: 1.02
UROBILINOGEN UR STRIP.AUTO-MCNC: 1 MG/DL
WBC # BLD AUTO: 6.5 K/UL (ref 4.8–10.8)
WBC #/AREA URNS HPF: ABNORMAL /HPF

## 2023-10-23 PROCEDURE — 700105 HCHG RX REV CODE 258: Mod: UD | Performed by: EMERGENCY MEDICINE

## 2023-10-23 PROCEDURE — 85025 COMPLETE CBC W/AUTO DIFF WBC: CPT

## 2023-10-23 PROCEDURE — 85610 PROTHROMBIN TIME: CPT

## 2023-10-23 PROCEDURE — 700111 HCHG RX REV CODE 636 W/ 250 OVERRIDE (IP): Mod: JZ,UD | Performed by: EMERGENCY MEDICINE

## 2023-10-23 PROCEDURE — 99285 EMERGENCY DEPT VISIT HI MDM: CPT

## 2023-10-23 PROCEDURE — 83605 ASSAY OF LACTIC ACID: CPT

## 2023-10-23 PROCEDURE — 87040 BLOOD CULTURE FOR BACTERIA: CPT | Mod: 91

## 2023-10-23 PROCEDURE — 96375 TX/PRO/DX INJ NEW DRUG ADDON: CPT

## 2023-10-23 PROCEDURE — 99222 1ST HOSP IP/OBS MODERATE 55: CPT | Performed by: INTERNAL MEDICINE

## 2023-10-23 PROCEDURE — 700111 HCHG RX REV CODE 636 W/ 250 OVERRIDE (IP): Mod: JZ | Performed by: INTERNAL MEDICINE

## 2023-10-23 PROCEDURE — 80053 COMPREHEN METABOLIC PANEL: CPT

## 2023-10-23 PROCEDURE — 85055 RETICULATED PLATELET ASSAY: CPT

## 2023-10-23 PROCEDURE — 96374 THER/PROPH/DIAG INJ IV PUSH: CPT

## 2023-10-23 PROCEDURE — 770006 HCHG ROOM/CARE - MED/SURG/GYN SEMI*

## 2023-10-23 PROCEDURE — 36415 COLL VENOUS BLD VENIPUNCTURE: CPT

## 2023-10-23 PROCEDURE — 85730 THROMBOPLASTIN TIME PARTIAL: CPT

## 2023-10-23 PROCEDURE — 86140 C-REACTIVE PROTEIN: CPT

## 2023-10-23 PROCEDURE — 81001 URINALYSIS AUTO W/SCOPE: CPT

## 2023-10-23 PROCEDURE — 85652 RBC SED RATE AUTOMATED: CPT

## 2023-10-23 RX ORDER — ACETAMINOPHEN 325 MG/1
650 TABLET ORAL EVERY 6 HOURS PRN
Status: DISCONTINUED | OUTPATIENT
Start: 2023-10-23 | End: 2023-11-03 | Stop reason: HOSPADM

## 2023-10-23 RX ORDER — KETOROLAC TROMETHAMINE 30 MG/ML
30 INJECTION, SOLUTION INTRAMUSCULAR; INTRAVENOUS ONCE
Status: COMPLETED | OUTPATIENT
Start: 2023-10-23 | End: 2023-10-23

## 2023-10-23 RX ORDER — LOSARTAN POTASSIUM 50 MG/1
50 TABLET ORAL DAILY
Status: DISCONTINUED | OUTPATIENT
Start: 2023-10-24 | End: 2023-11-03 | Stop reason: HOSPADM

## 2023-10-23 RX ORDER — LACTULOSE 20 G/30ML
15 SOLUTION ORAL DAILY
Status: DISCONTINUED | OUTPATIENT
Start: 2023-10-24 | End: 2023-10-24

## 2023-10-23 RX ORDER — TIOTROPIUM BROMIDE 18 UG/1
1 CAPSULE ORAL; RESPIRATORY (INHALATION) DAILY
Status: DISCONTINUED | OUTPATIENT
Start: 2023-10-24 | End: 2023-10-24

## 2023-10-23 RX ORDER — OXYCODONE HYDROCHLORIDE 10 MG/1
10 TABLET ORAL
Status: DISCONTINUED | OUTPATIENT
Start: 2023-10-23 | End: 2023-11-03 | Stop reason: HOSPADM

## 2023-10-23 RX ORDER — CEFAZOLIN 2 G/1
2 INJECTION, POWDER, FOR SOLUTION INTRAMUSCULAR; INTRAVENOUS ONCE
Status: COMPLETED | OUTPATIENT
Start: 2023-10-23 | End: 2023-10-23

## 2023-10-23 RX ORDER — LABETALOL HYDROCHLORIDE 5 MG/ML
10 INJECTION, SOLUTION INTRAVENOUS EVERY 4 HOURS PRN
Status: DISCONTINUED | OUTPATIENT
Start: 2023-10-23 | End: 2023-11-03 | Stop reason: HOSPADM

## 2023-10-23 RX ORDER — SODIUM CHLORIDE 9 MG/ML
INJECTION, SOLUTION INTRAVENOUS CONTINUOUS
Status: DISCONTINUED | OUTPATIENT
Start: 2023-10-23 | End: 2023-10-24

## 2023-10-23 RX ORDER — POTASSIUM CHLORIDE 750 MG/1
10 CAPSULE, EXTENDED RELEASE ORAL DAILY
Status: DISCONTINUED | OUTPATIENT
Start: 2023-10-24 | End: 2023-10-24

## 2023-10-23 RX ORDER — ONDANSETRON 4 MG/1
4 TABLET, ORALLY DISINTEGRATING ORAL EVERY 4 HOURS PRN
Status: DISCONTINUED | OUTPATIENT
Start: 2023-10-23 | End: 2023-11-03 | Stop reason: HOSPADM

## 2023-10-23 RX ORDER — ONDANSETRON 2 MG/ML
4 INJECTION INTRAMUSCULAR; INTRAVENOUS ONCE
Status: COMPLETED | OUTPATIENT
Start: 2023-10-23 | End: 2023-10-23

## 2023-10-23 RX ORDER — ONDANSETRON 2 MG/ML
4 INJECTION INTRAMUSCULAR; INTRAVENOUS EVERY 4 HOURS PRN
Status: DISCONTINUED | OUTPATIENT
Start: 2023-10-23 | End: 2023-11-03 | Stop reason: HOSPADM

## 2023-10-23 RX ORDER — PROMETHAZINE HYDROCHLORIDE 25 MG/1
12.5-25 TABLET ORAL EVERY 4 HOURS PRN
Status: DISCONTINUED | OUTPATIENT
Start: 2023-10-23 | End: 2023-11-03 | Stop reason: HOSPADM

## 2023-10-23 RX ORDER — ATORVASTATIN CALCIUM 20 MG/1
20 TABLET, FILM COATED ORAL NIGHTLY
Status: DISCONTINUED | OUTPATIENT
Start: 2023-10-24 | End: 2023-11-03 | Stop reason: HOSPADM

## 2023-10-23 RX ORDER — PROCHLORPERAZINE EDISYLATE 5 MG/ML
5-10 INJECTION INTRAMUSCULAR; INTRAVENOUS EVERY 4 HOURS PRN
Status: DISCONTINUED | OUTPATIENT
Start: 2023-10-23 | End: 2023-11-03 | Stop reason: HOSPADM

## 2023-10-23 RX ORDER — MORPHINE SULFATE 4 MG/ML
4 INJECTION INTRAVENOUS
Status: DISCONTINUED | OUTPATIENT
Start: 2023-10-23 | End: 2023-11-03 | Stop reason: HOSPADM

## 2023-10-23 RX ORDER — METOPROLOL SUCCINATE 25 MG/1
25 TABLET, EXTENDED RELEASE ORAL NIGHTLY
Status: DISCONTINUED | OUTPATIENT
Start: 2023-10-23 | End: 2023-11-03 | Stop reason: HOSPADM

## 2023-10-23 RX ORDER — SODIUM CHLORIDE, SODIUM LACTATE, POTASSIUM CHLORIDE, AND CALCIUM CHLORIDE .6; .31; .03; .02 G/100ML; G/100ML; G/100ML; G/100ML
30 INJECTION, SOLUTION INTRAVENOUS ONCE
Status: COMPLETED | OUTPATIENT
Start: 2023-10-23 | End: 2023-10-23

## 2023-10-23 RX ORDER — OXYCODONE HYDROCHLORIDE 5 MG/1
5 TABLET ORAL
Status: DISCONTINUED | OUTPATIENT
Start: 2023-10-23 | End: 2023-11-03 | Stop reason: HOSPADM

## 2023-10-23 RX ORDER — POLYETHYLENE GLYCOL 3350 17 G/17G
1 POWDER, FOR SOLUTION ORAL
Status: DISCONTINUED | OUTPATIENT
Start: 2023-10-23 | End: 2023-11-03 | Stop reason: HOSPADM

## 2023-10-23 RX ORDER — MORPHINE SULFATE 4 MG/ML
4 INJECTION INTRAVENOUS ONCE
Status: COMPLETED | OUTPATIENT
Start: 2023-10-23 | End: 2023-10-23

## 2023-10-23 RX ORDER — FUROSEMIDE 20 MG/1
20 TABLET ORAL DAILY
Status: DISCONTINUED | OUTPATIENT
Start: 2023-10-24 | End: 2023-11-03 | Stop reason: HOSPADM

## 2023-10-23 RX ORDER — PANTOPRAZOLE SODIUM 20 MG/1
20 TABLET, DELAYED RELEASE ORAL DAILY
Status: DISCONTINUED | OUTPATIENT
Start: 2023-10-24 | End: 2023-10-24

## 2023-10-23 RX ORDER — PROMETHAZINE HYDROCHLORIDE 25 MG/1
12.5-25 SUPPOSITORY RECTAL EVERY 4 HOURS PRN
Status: DISCONTINUED | OUTPATIENT
Start: 2023-10-23 | End: 2023-11-03 | Stop reason: HOSPADM

## 2023-10-23 RX ORDER — ENOXAPARIN SODIUM 100 MG/ML
40 INJECTION SUBCUTANEOUS DAILY
Status: DISCONTINUED | OUTPATIENT
Start: 2023-10-23 | End: 2023-11-03 | Stop reason: HOSPADM

## 2023-10-23 RX ORDER — BISACODYL 10 MG
10 SUPPOSITORY, RECTAL RECTAL
Status: DISCONTINUED | OUTPATIENT
Start: 2023-10-23 | End: 2023-11-03 | Stop reason: HOSPADM

## 2023-10-23 RX ORDER — AMOXICILLIN 250 MG
2 CAPSULE ORAL 2 TIMES DAILY
Status: DISCONTINUED | OUTPATIENT
Start: 2023-10-23 | End: 2023-11-03 | Stop reason: HOSPADM

## 2023-10-23 RX ADMIN — MORPHINE SULFATE 4 MG: 4 INJECTION, SOLUTION INTRAMUSCULAR; INTRAVENOUS at 19:56

## 2023-10-23 RX ADMIN — ENOXAPARIN SODIUM 40 MG: 100 INJECTION SUBCUTANEOUS at 23:17

## 2023-10-23 RX ADMIN — SODIUM CHLORIDE, POTASSIUM CHLORIDE, SODIUM LACTATE AND CALCIUM CHLORIDE 2559 ML: 600; 310; 30; 20 INJECTION, SOLUTION INTRAVENOUS at 19:59

## 2023-10-23 RX ADMIN — CEFAZOLIN 2 G: 2 INJECTION, POWDER, FOR SOLUTION INTRAMUSCULAR; INTRAVENOUS at 20:32

## 2023-10-23 RX ADMIN — SODIUM CHLORIDE: 9 INJECTION, SOLUTION INTRAVENOUS at 23:30

## 2023-10-23 RX ADMIN — KETOROLAC TROMETHAMINE 30 MG: 30 INJECTION, SOLUTION INTRAMUSCULAR; INTRAVENOUS at 19:57

## 2023-10-23 RX ADMIN — ONDANSETRON 4 MG: 2 INJECTION INTRAMUSCULAR; INTRAVENOUS at 19:57

## 2023-10-23 ASSESSMENT — FIBROSIS 4 INDEX
FIB4 SCORE: 4.17
FIB4 SCORE: 5.56

## 2023-10-23 ASSESSMENT — COGNITIVE AND FUNCTIONAL STATUS - GENERAL
WALKING IN HOSPITAL ROOM: A LITTLE
DAILY ACTIVITIY SCORE: 18
MOBILITY SCORE: 17
DRESSING REGULAR LOWER BODY CLOTHING: A LITTLE
TURNING FROM BACK TO SIDE WHILE IN FLAT BAD: A LITTLE
MOVING TO AND FROM BED TO CHAIR: A LITTLE
DRESSING REGULAR UPPER BODY CLOTHING: A LITTLE
STANDING UP FROM CHAIR USING ARMS: A LITTLE
EATING MEALS: A LITTLE
HELP NEEDED FOR BATHING: A LITTLE
CLIMB 3 TO 5 STEPS WITH RAILING: A LITTLE
SUGGESTED CMS G CODE MODIFIER MOBILITY: CK
TOILETING: A LITTLE
PERSONAL GROOMING: A LITTLE
MOVING FROM LYING ON BACK TO SITTING ON SIDE OF FLAT BED: A LOT
SUGGESTED CMS G CODE MODIFIER DAILY ACTIVITY: CK

## 2023-10-23 ASSESSMENT — LIFESTYLE VARIABLES
TOTAL SCORE: 0
DOES PATIENT WANT TO STOP DRINKING: NO
TOTAL SCORE: 0
HAVE PEOPLE ANNOYED YOU BY CRITICIZING YOUR DRINKING: NO
AVERAGE NUMBER OF DAYS PER WEEK YOU HAVE A DRINK CONTAINING ALCOHOL: 0
TOTAL SCORE: 0
EVER HAD A DRINK FIRST THING IN THE MORNING TO STEADY YOUR NERVES TO GET RID OF A HANGOVER: NO
EVER FELT BAD OR GUILTY ABOUT YOUR DRINKING: NO
ON A TYPICAL DAY WHEN YOU DRINK ALCOHOL HOW MANY DRINKS DO YOU HAVE: 0
ALCOHOL_USE: NO
CONSUMPTION TOTAL: NEGATIVE
HAVE YOU EVER FELT YOU SHOULD CUT DOWN ON YOUR DRINKING: NO
HOW MANY TIMES IN THE PAST YEAR HAVE YOU HAD 5 OR MORE DRINKS IN A DAY: 0

## 2023-10-23 ASSESSMENT — PAIN DESCRIPTION - PAIN TYPE
TYPE: ACUTE PAIN
TYPE: ACUTE PAIN

## 2023-10-24 ENCOUNTER — APPOINTMENT (OUTPATIENT)
Dept: RADIOLOGY | Facility: MEDICAL CENTER | Age: 63
DRG: 467 | End: 2023-10-24
Attending: EMERGENCY MEDICINE
Payer: MEDICAID

## 2023-10-24 LAB
ALBUMIN SERPL BCP-MCNC: 2.5 G/DL (ref 3.2–4.9)
ALBUMIN/GLOB SERPL: 0.8 G/DL
ALP SERPL-CCNC: 107 U/L (ref 30–99)
ALT SERPL-CCNC: 11 U/L (ref 2–50)
ANION GAP SERPL CALC-SCNC: 7 MMOL/L (ref 7–16)
APPEARANCE FLD: NORMAL
AST SERPL-CCNC: 16 U/L (ref 12–45)
BASOPHILS # BLD AUTO: 0.6 % (ref 0–1.8)
BASOPHILS # BLD: 0.03 K/UL (ref 0–0.12)
BILIRUB SERPL-MCNC: 0.9 MG/DL (ref 0.1–1.5)
BODY FLD TYPE: NORMAL
BUN SERPL-MCNC: 19 MG/DL (ref 8–22)
CALCIUM ALBUM COR SERPL-MCNC: 8.5 MG/DL (ref 8.5–10.5)
CALCIUM SERPL-MCNC: 7.3 MG/DL (ref 8.5–10.5)
CHLORIDE SERPL-SCNC: 101 MMOL/L (ref 96–112)
CO2 SERPL-SCNC: 23 MMOL/L (ref 20–33)
COLOR FLD: NORMAL
CREAT SERPL-MCNC: 0.99 MG/DL (ref 0.5–1.4)
CRYSTALS FLD MICRO: NORMAL
EOSINOPHIL # BLD AUTO: 0.12 K/UL (ref 0–0.51)
EOSINOPHIL NFR BLD: 2.2 % (ref 0–6.9)
ERYTHROCYTE [DISTWIDTH] IN BLOOD BY AUTOMATED COUNT: 49.8 FL (ref 35.9–50)
GFR SERPLBLD CREATININE-BSD FMLA CKD-EPI: 64 ML/MIN/1.73 M 2
GLOBULIN SER CALC-MCNC: 3.1 G/DL (ref 1.9–3.5)
GLUCOSE BLD STRIP.AUTO-MCNC: 105 MG/DL (ref 65–99)
GLUCOSE BLD STRIP.AUTO-MCNC: 126 MG/DL (ref 65–99)
GLUCOSE BLD STRIP.AUTO-MCNC: 160 MG/DL (ref 65–99)
GLUCOSE SERPL-MCNC: 112 MG/DL (ref 65–99)
HCT VFR BLD AUTO: 30.9 % (ref 37–47)
HGB BLD-MCNC: 10.2 G/DL (ref 12–16)
IMM GRANULOCYTES # BLD AUTO: 0.02 K/UL (ref 0–0.11)
IMM GRANULOCYTES NFR BLD AUTO: 0.4 % (ref 0–0.9)
LYMPHOCYTES # BLD AUTO: 1.13 K/UL (ref 1–4.8)
LYMPHOCYTES NFR BLD: 20.8 % (ref 22–41)
LYMPHOCYTES NFR FLD: 4 %
MCH RBC QN AUTO: 29.7 PG (ref 27–33)
MCHC RBC AUTO-ENTMCNC: 33 G/DL (ref 32.2–35.5)
MCV RBC AUTO: 89.8 FL (ref 81.4–97.8)
MONOCYTES # BLD AUTO: 0.68 K/UL (ref 0–0.85)
MONOCYTES NFR BLD AUTO: 12.5 % (ref 0–13.4)
MONOS+MACROS NFR FLD MANUAL: 2 %
NEUTROPHILS # BLD AUTO: 3.46 K/UL (ref 1.82–7.42)
NEUTROPHILS NFR BLD: 63.5 % (ref 44–72)
NEUTROPHILS NFR FLD: 94 %
NRBC # BLD AUTO: 0 K/UL
NRBC BLD-RTO: 0 /100 WBC (ref 0–0.2)
NUC CELL # FLD: NORMAL CELLS/UL
PLATELET # BLD AUTO: 74 K/UL (ref 164–446)
PLATELETS.RETICULATED NFR BLD AUTO: 3.9 % (ref 0.6–13.1)
PMV BLD AUTO: 11.6 FL (ref 9–12.9)
POTASSIUM SERPL-SCNC: 3.5 MMOL/L (ref 3.6–5.5)
PROT SERPL-MCNC: 5.6 G/DL (ref 6–8.2)
RBC # BLD AUTO: 3.44 M/UL (ref 4.2–5.4)
RBC # FLD: NORMAL CELLS/UL
SODIUM SERPL-SCNC: 131 MMOL/L (ref 135–145)
WBC # BLD AUTO: 5.4 K/UL (ref 4.8–10.8)

## 2023-10-24 PROCEDURE — 89060 EXAM SYNOVIAL FLUID CRYSTALS: CPT

## 2023-10-24 PROCEDURE — 80053 COMPREHEN METABOLIC PANEL: CPT

## 2023-10-24 PROCEDURE — 89051 BODY FLUID CELL COUNT: CPT

## 2023-10-24 PROCEDURE — 700102 HCHG RX REV CODE 250 W/ 637 OVERRIDE(OP): Performed by: INTERNAL MEDICINE

## 2023-10-24 PROCEDURE — 36415 COLL VENOUS BLD VENIPUNCTURE: CPT

## 2023-10-24 PROCEDURE — 82962 GLUCOSE BLOOD TEST: CPT | Mod: 91

## 2023-10-24 PROCEDURE — 85055 RETICULATED PLATELET ASSAY: CPT

## 2023-10-24 PROCEDURE — 770006 HCHG ROOM/CARE - MED/SURG/GYN SEMI*

## 2023-10-24 PROCEDURE — 73700 CT LOWER EXTREMITY W/O DYE: CPT | Mod: LT

## 2023-10-24 PROCEDURE — 700105 HCHG RX REV CODE 258: Performed by: EMERGENCY MEDICINE

## 2023-10-24 PROCEDURE — A9270 NON-COVERED ITEM OR SERVICE: HCPCS | Performed by: INTERNAL MEDICINE

## 2023-10-24 PROCEDURE — 85025 COMPLETE CBC W/AUTO DIFF WBC: CPT

## 2023-10-24 PROCEDURE — 99232 SBSQ HOSP IP/OBS MODERATE 35: CPT | Performed by: STUDENT IN AN ORGANIZED HEALTH CARE EDUCATION/TRAINING PROGRAM

## 2023-10-24 PROCEDURE — 700111 HCHG RX REV CODE 636 W/ 250 OVERRIDE (IP): Performed by: INTERNAL MEDICINE

## 2023-10-24 PROCEDURE — 700101 HCHG RX REV CODE 250: Performed by: INTERNAL MEDICINE

## 2023-10-24 RX ORDER — OMEPRAZOLE 20 MG/1
20 CAPSULE, DELAYED RELEASE ORAL DAILY
Status: DISCONTINUED | OUTPATIENT
Start: 2023-10-24 | End: 2023-11-03 | Stop reason: HOSPADM

## 2023-10-24 RX ORDER — POTASSIUM CHLORIDE 20 MEQ/1
10 TABLET, EXTENDED RELEASE ORAL DAILY
Status: DISCONTINUED | OUTPATIENT
Start: 2023-10-24 | End: 2023-11-03 | Stop reason: HOSPADM

## 2023-10-24 RX ADMIN — OXYCODONE HYDROCHLORIDE 10 MG: 10 TABLET ORAL at 17:39

## 2023-10-24 RX ADMIN — TIOTROPIUM BROMIDE INHALATION SPRAY 5 MCG: 3.12 SPRAY, METERED RESPIRATORY (INHALATION) at 11:23

## 2023-10-24 RX ADMIN — OMEPRAZOLE 20 MG: 20 CAPSULE, DELAYED RELEASE ORAL at 05:16

## 2023-10-24 RX ADMIN — CEFTRIAXONE SODIUM 2000 MG: 10 INJECTION, POWDER, FOR SOLUTION INTRAVENOUS at 07:29

## 2023-10-24 RX ADMIN — DOCUSATE SODIUM 50 MG AND SENNOSIDES 8.6 MG 2 TABLET: 8.6; 5 TABLET, FILM COATED ORAL at 17:40

## 2023-10-24 RX ADMIN — INSULIN HUMAN 1 UNITS: 100 INJECTION, SOLUTION PARENTERAL at 15:01

## 2023-10-24 RX ADMIN — ENOXAPARIN SODIUM 40 MG: 100 INJECTION SUBCUTANEOUS at 17:40

## 2023-10-24 RX ADMIN — OXYCODONE HYDROCHLORIDE 10 MG: 10 TABLET ORAL at 00:41

## 2023-10-24 RX ADMIN — METOPROLOL SUCCINATE 25 MG: 25 TABLET, EXTENDED RELEASE ORAL at 20:21

## 2023-10-24 RX ADMIN — OXYCODONE HYDROCHLORIDE 10 MG: 10 TABLET ORAL at 20:40

## 2023-10-24 RX ADMIN — ATORVASTATIN CALCIUM 20 MG: 20 TABLET, FILM COATED ORAL at 20:22

## 2023-10-24 RX ADMIN — SODIUM CHLORIDE: 9 INJECTION, SOLUTION INTRAVENOUS at 07:36

## 2023-10-24 RX ADMIN — POTASSIUM CHLORIDE 10 MEQ: 20 TABLET, EXTENDED RELEASE ORAL at 05:16

## 2023-10-24 RX ADMIN — OXYCODONE HYDROCHLORIDE 10 MG: 10 TABLET ORAL at 08:07

## 2023-10-24 ASSESSMENT — ENCOUNTER SYMPTOMS
PHOTOPHOBIA: 0
TREMORS: 0
ABDOMINAL PAIN: 0
HEARTBURN: 0
SPUTUM PRODUCTION: 0
FEVER: 1
WEIGHT LOSS: 0
VOMITING: 0
FEVER: 0
NERVOUS/ANXIOUS: 0
SENSORY CHANGE: 0
FALLS: 0
FOCAL WEAKNESS: 0
ORTHOPNEA: 0
FLANK PAIN: 0
CHILLS: 0
HEADACHES: 0
HEMOPTYSIS: 0
DOUBLE VISION: 0
HALLUCINATIONS: 0
SPEECH CHANGE: 0
INSOMNIA: 0
NAUSEA: 0
CHILLS: 1
POLYDIPSIA: 0
NECK PAIN: 0
BACK PAIN: 0
BLURRED VISION: 0
SHORTNESS OF BREATH: 0
COUGH: 0
DIZZINESS: 0
BRUISES/BLEEDS EASILY: 0
PALPITATIONS: 0
EYE PAIN: 0

## 2023-10-24 ASSESSMENT — COGNITIVE AND FUNCTIONAL STATUS - GENERAL
CLIMB 3 TO 5 STEPS WITH RAILING: A LITTLE
SUGGESTED CMS G CODE MODIFIER DAILY ACTIVITY: CK
HELP NEEDED FOR BATHING: A LITTLE
MOBILITY SCORE: 18
WALKING IN HOSPITAL ROOM: A LITTLE
SUGGESTED CMS G CODE MODIFIER MOBILITY: CK
DRESSING REGULAR UPPER BODY CLOTHING: A LITTLE
EATING MEALS: A LITTLE
MOVING TO AND FROM BED TO CHAIR: A LITTLE
TURNING FROM BACK TO SIDE WHILE IN FLAT BAD: A LITTLE
PERSONAL GROOMING: A LITTLE
DRESSING REGULAR LOWER BODY CLOTHING: A LITTLE
TOILETING: A LITTLE
MOVING FROM LYING ON BACK TO SITTING ON SIDE OF FLAT BED: A LITTLE
DAILY ACTIVITIY SCORE: 18
STANDING UP FROM CHAIR USING ARMS: A LITTLE

## 2023-10-24 ASSESSMENT — PAIN DESCRIPTION - PAIN TYPE
TYPE: ACUTE PAIN

## 2023-10-24 ASSESSMENT — LIFESTYLE VARIABLES: SUBSTANCE_ABUSE: 0

## 2023-10-24 NOTE — ED TRIAGE NOTES
"Chief Complaint   Patient presents with    Leg Pain     Left knee swelling x1 week      Pt to triage for above complaint. Pt states knee became swollen and red about a week ago. Pt endorses hx of multiple surgeries on that knee.     Pt educated on triage process and placed in lobby.      /71   Pulse 85   Temp 36.9 °C (98.5 °F) (Temporal)   Resp 14   Ht 1.727 m (5' 8\")   Wt 85.3 kg (188 lb)   SpO2 95%       "

## 2023-10-24 NOTE — ED NOTES
Bedside report from Koko MONTAÑO. Pt resting on jean NAD noted. BP and pulse ox monitoring in place, pt currently on RA. Pt high fall risk, standard precautions in place and pt using call light appropriately.     ERP at bedside.

## 2023-10-24 NOTE — ASSESSMENT & PLAN NOTE
With hyperglycemia for which she is requiring sliding scale insulin  Once she is stabilized out of the ICU then we will consider starting oral medications with goal of transitioning off insulin

## 2023-10-24 NOTE — CARE PLAN
The patient is Stable - Low risk of patient condition declining or worsening    Shift Goals  Clinical Goals: patient will report a reduced pain from 9 to less than 4/10 at the end of the shift  Patient Goals: rest  Family Goals: gisele    Progress made toward(s) clinical / shift goals:  Received patient from ED via stretcher. Patient is alert and oriented x4. Assessment was performed. Administered scheduled and PRN medication. Hourly rounds and fall precautions in place. Call light within reach.     Patient is not progressing towards the following goals:    Problem: Pain - Standard  Goal: Alleviation of pain or a reduction in pain to the patient’s comfort goal  Outcome: Not Progressing     Problem: Knowledge Deficit - Standard  Goal: Patient and family/care givers will demonstrate understanding of plan of care, disease process/condition, diagnostic tests and medications  Outcome: Not Progressing  Note: Explain disease process/condition, treatment plan, diagnostic tests and medications every shift.

## 2023-10-24 NOTE — PROGRESS NOTES
Hospital Medicine Daily Progress Note    Date of Service  10/24/2023    Chief Complaint  April Alicia is a 63 y.o. female admitted 10/23/2023 with knee pain.    Hospital Course  April Alicia is a 63 y.o. female with history of hypertension, diabetes, GERD, A-fib  and COPD and bilateral total knee replacement who presented 10/23/2023 with complaints of left leg pain onset 4 days ago, swelling and redness.  She had fever with Tmax 102.  She has been taking Augmentin from 10/5/2023.  Patient started on cefazolin in ER.  Currently CT of the left knee is pending.  Subsequently orthopedic surgery consultation will be required.    Interval Problem Update  No acute events overnight.  Patient states knee pain has been worse the past week or so.  CT knee shows large left knee effusion.  Orthopedic surgery consulted.  S/p knee aspiration, cultures pending.  Continue ceftriaxone.  Will consult infectious disease tomorrow for recommendations.  Currently not a good surgical candidate.      I have discussed this patient's plan of care and discharge plan at IDT rounds today with Case Management, Nursing, Nursing leadership, and other members of the IDT team.    Consultants/Specialty  orthopedics    Code Status  Full Code    Disposition  The patient is not medically cleared for discharge to home or a post-acute facility.      I have placed the appropriate orders for post-discharge needs.    Review of Systems  Review of Systems   Constitutional:  Negative for chills and fever.   Eyes:  Negative for blurred vision and pain.   Respiratory:  Negative for cough and shortness of breath.    Cardiovascular:  Negative for chest pain, palpitations and leg swelling.   Gastrointestinal:  Negative for abdominal pain, nausea and vomiting.   Genitourinary:  Negative for dysuria and urgency.   Musculoskeletal:  Positive for joint pain. Negative for back pain and falls.   Skin:  Negative for itching and rash.   Neurological:   Negative for dizziness, sensory change, focal weakness and headaches.   Psychiatric/Behavioral:  Negative for substance abuse. The patient does not have insomnia.         Physical Exam  Temp:  [36.4 °C (97.5 °F)-36.9 °C (98.5 °F)] (P) 36.4 °C (97.5 °F)  Pulse:  [68-85] 77  Resp:  [12-20] 19  BP: ()/(48-79) 125/61  SpO2:  [92 %-96 %] 95 %    Physical Exam  Vitals reviewed.   Constitutional:       General: She is not in acute distress.     Appearance: She is not diaphoretic.   HENT:      Head: Normocephalic and atraumatic.      Right Ear: External ear normal.      Left Ear: External ear normal.      Nose: Nose normal. No congestion.      Mouth/Throat:      Pharynx: No oropharyngeal exudate or posterior oropharyngeal erythema.   Eyes:      Extraocular Movements: Extraocular movements intact.      Pupils: Pupils are equal, round, and reactive to light.   Cardiovascular:      Rate and Rhythm: Normal rate and regular rhythm.   Pulmonary:      Effort: Pulmonary effort is normal. No respiratory distress.      Breath sounds: Normal breath sounds. No wheezing.   Abdominal:      General: Bowel sounds are normal. There is no distension.      Palpations: Abdomen is soft.      Tenderness: There is no abdominal tenderness.   Musculoskeletal:         General: Swelling and tenderness present. Normal range of motion.      Cervical back: Normal range of motion and neck supple.      Comments: Left knee swelling with warmth and pain to palpation, no open wounds   Skin:     General: Skin is warm and dry.   Neurological:      General: No focal deficit present.      Mental Status: She is alert and oriented to person, place, and time.      Cranial Nerves: No cranial nerve deficit.      Sensory: No sensory deficit.      Motor: No weakness.   Psychiatric:         Mood and Affect: Mood normal.         Behavior: Behavior normal.         Fluids    Intake/Output Summary (Last 24 hours) at 10/24/2023 6408  Last data filed at 10/24/2023  1030  Gross per 24 hour   Intake 800 ml   Output --   Net 800 ml       Laboratory  Recent Labs     10/23/23  1941 10/24/23  0503   WBC 6.5 5.4   RBC 4.08* 3.44*   HEMOGLOBIN 11.9* 10.2*   HEMATOCRIT 36.1* 30.9*   MCV 88.5 89.8   MCH 29.2 29.7   MCHC 33.0 33.0   RDW 49.4 49.8   PLATELETCT 82* 74*   MPV 10.4 11.6     Recent Labs     10/23/23  1941 10/24/23  0503   SODIUM 130* 131*   POTASSIUM 3.3* 3.5*   CHLORIDE 100 101   CO2 20 23   GLUCOSE 155* 112*   BUN 18 19   CREATININE 1.01 0.99   CALCIUM 7.9* 7.3*     Recent Labs     10/23/23  1941   APTT 40.2*   INR 1.52*               Imaging  CT-KNEE W/O LEFT   Final Result         1.  Moderate knee joint effusion, quantity of fluid appears significantly increased since prior study, noncontrast technique limits characterization of fluid.   2.  No acute traumatic bony injury identified.   3.  Slight lucency at the tibial arthroplasty component suggests possible component of hardware loosening.   4.  Atherosclerosis           Assessment/Plan  * Knee pain- (present on admission)  Assessment & Plan  Concern for cellulitis or septic joint  Hx left knee replacement with recurrent infections  Ortho consulted  CT shows left knee effusion  Knee aspiration today 10/24, follow up cultures  Not a good surgical candidate per surgery  Will consult ID tomorrow  Continue antibiotics    Type 2 diabetes mellitus (HCC)- (present on admission)  Assessment & Plan  A1c 5.4  Monitor blood sugar daily.  Insulin sliding scale    Paroxysmal atrial fibrillation, hx of- (present on admission)  Assessment & Plan  Noted, not on anticoagulation    Hypertension- (present on admission)  Assessment & Plan  Continue losartan, metoprolol with holding parameters  IV labetalol as needed         VTE prophylaxis:    enoxaparin ppx

## 2023-10-24 NOTE — CONSULTS
CC: Chronic left knee infection    HPI:   April Murcia is a 63-year-old female with chronic infection of her left total knee.  She has had resection and antibiotic spacer with spacer exchange x2.  Unfortunately, she is recurrently lost to follow-up and we are unable to complete her antibiotic protocol or her continued follow-up.  She has missed her last 5 clinic appointments and has not had follow-up.  Over the last week she has had increasing pain in her left knee.  She was walking without difficulty prior to that.  She has not been on any antibiotics in the past several months.  She notes that her knee pain increased and she was unable to walk long distances.  She is unsure of the rest of her health status at this point.  She notes that today her knee pain feels better than it did a week ago.  No fevers, chills, nausea, vomiting.    ROS: Positive for musculoskeletal pain and what is stated in the HPI and PMH, otherwise negative review of systems    PMH:   Past Medical History:   Diagnosis Date    Arrhythmia     Arthritis     OA in knees    ASTHMA     Blood clotting disorder (HCC) 2018    right leg blood clot    Dental disorder     upper and lower dentures    Diabetes     Emphysema of lung (HCC)     Heart abnormality     leakage in left valve    Heart burn     left knee    Heart valve disease     Hypertension     Infection 9/4/2017    Infection 2018    Right knee after total knee    Liver disease     Pneumonia 02/2020    Seizure disorder ([facility])        PSH:   Past Surgical History:   Procedure Laterality Date    PB REVISE KNEE JOINT REPLACE,ALL PARTS Left 12/28/2022    Procedure: REVISION, TOTAL ARTHROPLASTY, KNEE, ALL COMPONENTS-LEFT;  Surgeon: Wild Luz M.D.;  Location: SURGERY [facility];  Service: Orthopedics    IRRIGATION & DEBRIDEMENT GENERAL Left 12/28/2022    Procedure: IRRIGATION AND DEBRIDEMENT, WOUND;  Surgeon: Wild Luz M.D.;  Location: SURGERY [facility];  Service: Orthopedics     PB REVISE KNEE JOINT REPLACE,ALL PARTS Left 7/19/2022    Procedure: REVISION, TOTAL ARTHROPLASTY, KNEE, ALL COMPONENTS - ANTIBIOTIC SPACER;  Surgeon: Wild Luz M.D.;  Location: SURGERY [facility];  Service: Orthopedics    IRRIGATION & DEBRIDEMENT GENERAL Left 7/17/2022    Procedure: IRRIGATION AND DEBRIDEMENT, SHOULDER;  Surgeon: Obdulio Gray M.D.;  Location: SURGERY [facility];  Service: Orthopedics    PB TOTAL KNEE ARTHROPLASTY Left 8/24/2020    Procedure: ARTHROPLASTY, KNEE, TOTAL;  Surgeon: Víctor Faustin M.D.;  Location: SURGERY [facility];  Service: Orthopedics    KNEE ARTHROPLASTY TOTAL Right 2018    IRRIGATION & DEBRIDEMENT ORTHO Right 9/3/2017    Procedure: IRRIGATION & DEBRIDEMENT ORTHO, POLY EXCHANGE;  Surgeon: Jerome Russell M.D.;  Location: SURGERY [facility];  Service: Orthopedics    COLONOSCOPY WITH CLIPPING  10/28/2015    Procedure: COLONOSCOPY WITH CLIPPING;  Surgeon: Ruben Cooln M.D.;  Location: ENDOSCOPY [facility];  Service:     COLONOSCOPY WITH SCLEROTHERAPY  10/28/2015    Procedure: COLONOSCOPY WITH SCLEROTHERAPY;  Surgeon: Ruben Colon M.D.;  Location: ENDOSCOPY [facility];  Service:     COLONOSCOPY WITH TATTOOING  10/28/2015    Procedure: COLONOSCOPY WITH TATTOOING;  Surgeon: Ruben Colon M.D.;  Location: ENDOSCOPY [facility];  Service:     GYN SURGERY  2003    hysteroscoopy    GYN SURGERY  1982    tubal ligation       Meds:   Medications Prior to Admission   Medication Sig Dispense Refill Last Dose    losartan (COZAAR) 100 MG Tab        amoxicillin-clavulanate (AUGMENTIN) 875-125 MG Tab Take 1 Tablet by mouth 2 times a day for 57 days. 114 Tablet 0 10/23/2023 at 0800    bacitracin 500 UNIT/GM ointment        bupivacaine-0.5%-epinephrine 1:[id number] PF (SENSORCAINE-MPF/EPINEPHRINE) 0.5% -1:[id number] Solution        calcium/vitamin D (OYSTER SHELL CALCIUM/D) 500-200 MG-UNIT Tab 1 tbl twice a day by oral route.       metoprolol SR (TOPROL  XL) 25 MG TABLET SR 24 HR Take 1 Tablet by mouth every evening. 30 Tablet 11     potassium chloride (MICRO-K) 10 MEQ capsule Take 1 Capsule by mouth every day. 90 Capsule 3     furosemide (LASIX) 20 MG Tab Take 1 Tablet by mouth every day. 90 Tablet 3     senna-docusate (PERICOLACE OR SENOKOT S) 8.6-50 MG Tab Take 2 Tablets by mouth 2 times a day. 30 Tablet 0     pantoprazole (PROTONIX) 20 MG tablet Take 20 mg by mouth every day.       REFRESH TEARS 0.5 % Solution Administer 2 Drops into both eyes as needed (dry eyes).       Cholecalciferol (VITAMIN D3) 50 MCG (2000 UT) Tab Take 2,000 Units by mouth every day.       ferrous sulfate 325 (65 Fe) MG tablet Take 325 mg by mouth every day.       magnesium oxide (MAG-OX) 400 MG Tab tablet Take 400 mg by mouth every day.       zinc sulfate (ZINCATE) 220 (50 Zn) MG Cap Take 1 Capsule by mouth every day. 30 Capsule 3     sodium bicarbonate (SODIUM BICARBONATE) 650 MG Tab Take 2 Tablets by mouth 2 times a day. 120 Tablet 3     lactulose 20 GM/30ML Solution Take 15 mL by mouth every day. 450 mL      atorvastatin (LIPITOR) 20 MG Tab Take 1 Tablet by mouth every evening. 90 Tablet 3     SPIRIVA HANDIHALER 18 MCG Cap Place 1 Capsule into inhaler and inhale every day.          Allergies:   Allergies   Allergen Reactions    Nkda [No Known Drug Allergy]        SH:   Social History     Socioeconomic History    Marital status: Single     Spouse name: Not on file    Number of children: Not on file    Years of education: Not on file    Highest education level: Not on file   Occupational History    Not on file   Tobacco Use    Smoking status: Former     Current packs/day: 0.00     Types: Cigarettes     Quit date: 2016     Years since quittin.8    Smokeless tobacco: Former     Quit date: 2021    Tobacco comments:     a few a day when I can   Vaping Use    Vaping Use: Never used   Substance and Sexual Activity    Alcohol use: Not Currently    Drug use: Not Currently    Sexual  activity: Not on file   Other Topics Concern    Not on file   Social History Narrative    Not on file     Social Determinants of Health     Financial Resource Strain: Low Risk  (1/28/2020)    Overall Financial Resource Strain (CARDIA)     Difficulty of Paying Living Expenses: Not hard at all   Food Insecurity: No Food Insecurity (1/28/2020)    Hunger Vital Sign     Worried About Running Out of Food in the Last Year: Never true     Ran Out of Food in the Last Year: Never true   Transportation Needs: No Transportation Needs (1/28/2020)    PRAPARE - Transportation     Lack of Transportation (Medical): No     Lack of Transportation (Non-Medical): No   Physical Activity: Not on file   Stress: Not on file   Social Connections: Not on file   Intimate Partner Violence: Not on file   Housing Stability: Not on file       FH:   No family history on file.    Physical Exam:   General: AO x4  Eyes: non-icteric  Breathing: nonlabored  MSK:   Evaluation of the left knee demonstrates 3+ effusion.  Tenderness to palpation around the knee.  5 out of 5 strength with EHL, FHL, tib ant and gastrocsoleus    Imaging:   CT scan reviewed and demonstrates large effusion    Assessment/Plan:   63-year-old female with chronic infection of her left total knee.  Unfortunately, she has had very poor response to recurrent infections.  She has had a antibiotic spacer revision x2.  I have had long discussions with her in clinic in the hospital about continued management of chronic infection with antibiotic spacer exchange versus medical management versus amputation.  I have very poor prognosis with respect to antibiotic spacer exchange given her history of recurrent infections.  However, she does not have any other good options either.  I do not think she would be a good candidate for amputation as she has been minimally ambulatory and has few resources at home.  We did aspirate her knee today and will restart antibiotics.  I would appreciate an  infectious disease consult for consideration of chronic medical management versus antibiotic state and spacer exchange.  However, again I have low hopes for antibiotic spacer exchange providing long-term infection eradication.    Procedure.  After appropriate pain management by the medical team the left knee was prepped and draped in sterile fashion.  110 cc of cloudy statement serous fluid was aspirated from the knee.  The patient tolerated the procedure well.  Fluid was sent for culture, cell count and Gram stain.      Wild Luz M.D.

## 2023-10-24 NOTE — ED PROVIDER NOTES
ER Provider Note    Scribed for Dr. Maryanne Lauren D.O. by Sri Mcginnis. 10/23/2023  7:11 PM    Primary Care Provider: Anum Gatica M.D.    CHIEF COMPLAINT  Chief Complaint   Patient presents with    Leg Pain     Left knee swelling x1 week        EXTERNAL RECORDS REVIEWED  Outpatient Notes The patient was seen on the 5th of this month for a follow up appointment and medication refill from Infectious Disease for an infection of prosthetic knee joint. The patient has history of alcohol and cocaine abuse, cirrhosis, asthma, A-fib on apixaban, type 2 diabetes, and hypertension.     HPI/ROS  LIMITATION TO HISTORY   Select: Poor historian    April Alicia is a 63 y.o. female who has history of hypertension and COPD presents to the ED for evaluation of left leg pain onset 4 days ago. She describes that her left leg has been warm, red and swollen consistently for 4 days. The patient states she also has fever, and vomiting, but denies any diarrhea.  The patient has been able to consume chicken broth and soup, but states that her appetite has been significantly decreased. She notes that her max temperature was 102 °F. The patient reports that she has skin grafts secondary to blood poisoning and states that she almost had her legs amputated. The patient takes amoxicillin twice a day. Per nursing note, the patient endorses history of multiple surgeries on her left knee. The patient denies any history of ulcers or cancer. The patient states that she used to smoke tobacco, but reports that she stopped a few months ago.     PAST MEDICAL HISTORY  Past Medical History:   Diagnosis Date    Arrhythmia     Arthritis     OA in knees    ASTHMA     Blood clotting disorder (HCC) 2018    right leg blood clot    Dental disorder     upper and lower dentures    Diabetes     Emphysema of lung (HCC)     Heart abnormality     leakage in left valve    Heart burn     left knee    Heart valve disease     Hypertension      Infection 9/4/2017    Infection 2018    Right knee after total knee    Liver disease     Pneumonia 02/2020    Seizure disorder (HCC)      SURGICAL HISTORY  Past Surgical History:   Procedure Laterality Date    PB REVISE KNEE JOINT REPLACE,ALL PARTS Left 12/28/2022    Procedure: REVISION, TOTAL ARTHROPLASTY, KNEE, ALL COMPONENTS-LEFT;  Surgeon: Wild Luz M.D.;  Location: [facility];  Service: Orthopedics    IRRIGATION & DEBRIDEMENT GENERAL Left 12/28/2022    Procedure: IRRIGATION AND DEBRIDEMENT, WOUND;  Surgeon: Wild Luz M.D.;  Location: [facility];  Service: Orthopedics    PB REVISE KNEE JOINT REPLACE,ALL PARTS Left 7/19/2022    Procedure: REVISION, TOTAL ARTHROPLASTY, KNEE, ALL COMPONENTS - ANTIBIOTIC SPACER;  Surgeon: Wild Luz M.D.;  Location: [facility];  Service: Orthopedics    IRRIGATION & DEBRIDEMENT GENERAL Left 7/17/2022    Procedure: IRRIGATION AND DEBRIDEMENT, SHOULDER;  Surgeon: Obdulio Gray M.D.;  Location: [facility];  Service: Orthopedics    PB TOTAL KNEE ARTHROPLASTY Left 8/24/2020    Procedure: ARTHROPLASTY, KNEE, TOTAL;  Surgeon: Víctor Faustin M.D.;  Location: [facility];  Service: Orthopedics    KNEE ARTHROPLASTY TOTAL Right 2018    IRRIGATION & DEBRIDEMENT ORTHO Right 9/3/2017    Procedure: IRRIGATION & DEBRIDEMENT ORTHO, POLY EXCHANGE;  Surgeon: Jerome Russell M.D.;  Location: [facility];  Service: Orthopedics    COLONOSCOPY WITH CLIPPING  10/28/2015    Procedure: COLONOSCOPY WITH CLIPPING;  Surgeon: Ruben Colon M.D.;  Location: [facility];  Service:     COLONOSCOPY WITH SCLEROTHERAPY  10/28/2015    Procedure: COLONOSCOPY WITH SCLEROTHERAPY;  Surgeon: Ruben Colon M.D.;  Location: [facility];  Service:     COLONOSCOPY WITH TATTOOING  10/28/2015    Procedure: COLONOSCOPY WITH TATTOOING;  Surgeon: Ruben Colon M.D.;  Location: [facility];   "Service:     GYN SURGERY  2003    hysteroscoopy    GYN SURGERY  1982    tubal ligation     FAMILY HISTORY  No family history pertinent.    SOCIAL HISTORY   reports that she quit smoking about 6 years ago. Her smoking use included cigarettes. She quit smokeless tobacco use about 2 years ago. She reports that she does not currently use alcohol. She reports that she does not currently use drugs.    CURRENT MEDICATIONS  See nurses notes  ALLERGIES  Nkda [no known drug allergy]    PHYSICAL EXAM  /71   Pulse 85   Temp 36.9 °C (98.5 °F) (Temporal)   Resp 14   Ht 1.727 m (5' 8\")   Wt 85.3 kg (188 lb)   LMP 06/02/2008   SpO2 95%   BMI 28.59 kg/m²   Constitutional: Patient is extremely hard of hearing. Very poor historian. Mild distress.   HENT: Normocephalic, atraumatic. Oral mucosa moist.   Cardiovascular: Normal heart rate and Regular rhythm. No murmur  Thorax & Lungs: Clear and equal breath sounds with good excursion. No respiratory distress, no rhonchi, wheezing or rales.   Abdomen: Bowel sounds normal in all four quadrants. Soft,nontender, no rebound , guarding, palpable masses.   Skin: Warm, Dry, No rashes.   Extremities: Right leg normal. Left knee is about 3 times the size of the right knee. Increased warmth. Mild erythema. Tender to the touch. Neurovascularly intact. Old skin graft changes on left lower leg.   Neurologic: Alert & oriented x 3, Normal motor function, Normal sensory function.   Psychiatric: Affect normal, Judgment normal, Mood normal.     DIAGNOSTIC STUDIES & PROCEDURES    Labs:   Results for orders placed or performed during the hospital encounter of 10/23/23   CBC WITH DIFFERENTIAL   Result Value Ref Range    WBC 6.5 4.8 - 10.8 K/uL    RBC 4.08 (L) 4.20 - 5.40 M/uL    Hemoglobin 11.9 (L) 12.0 - 16.0 g/dL    Hematocrit 36.1 (L) 37.0 - 47.0 %    MCV 88.5 81.4 - 97.8 fL    MCH 29.2 27.0 - 33.0 pg    MCHC 33.0 32.2 - 35.5 g/dL    RDW 49.4 35.9 - 50.0 fL    Platelet Count 82 (L) 164 - 446 " K/uL    MPV 10.4 9.0 - 12.9 fL    Neutrophils-Polys 71.50 44.00 - 72.00 %    Lymphocytes 15.30 (L) 22.00 - 41.00 %    Monocytes 11.30 0.00 - 13.40 %    Eosinophils 0.60 0.00 - 6.90 %    Basophils 0.80 0.00 - 1.80 %    Immature Granulocytes 0.50 0.00 - 0.90 %    Nucleated RBC 0.00 0.00 - 0.20 /100 WBC    Neutrophils (Absolute) 4.62 1.82 - 7.42 K/uL    Lymphs (Absolute) 0.99 (L) 1.00 - 4.80 K/uL    Monos (Absolute) 0.73 0.00 - 0.85 K/uL    Eos (Absolute) 0.04 0.00 - 0.51 K/uL    Baso (Absolute) 0.05 0.00 - 0.12 K/uL    Immature Granulocytes (abs) 0.03 0.00 - 0.11 K/uL    NRBC (Absolute) 0.00 K/uL   COMP METABOLIC PANEL   Result Value Ref Range    Sodium 130 (L) 135 - 145 mmol/L    Potassium 3.3 (L) 3.6 - 5.5 mmol/L    Chloride 100 96 - 112 mmol/L    Co2 20 20 - 33 mmol/L    Anion Gap 10.0 7.0 - 16.0    Glucose 155 (H) 65 - 99 mg/dL    Bun 18 8 - 22 mg/dL    Creatinine 1.01 0.50 - 1.40 mg/dL    Calcium 7.9 (L) 8.5 - 10.5 mg/dL    Correct Calcium 8.8 8.5 - 10.5 mg/dL    AST(SGOT) 18 12 - 45 U/L    ALT(SGPT) 11 2 - 50 U/L    Alkaline Phosphatase 118 (H) 30 - 99 U/L    Total Bilirubin 1.8 (H) 0.1 - 1.5 mg/dL    Albumin 2.9 (L) 3.2 - 4.9 g/dL    Total Protein 6.3 6.0 - 8.2 g/dL    Globulin 3.4 1.9 - 3.5 g/dL    A-G Ratio 0.9 g/dL   URINALYSIS (UA)    Specimen: Urine   Result Value Ref Range    Color DK Yellow     Character Cloudy (A)     Specific Gravity 1.024 <1.035    Ph 5.0 5.0 - 8.0    Glucose Negative Negative mg/dL    Ketones Trace (A) Negative mg/dL    Protein 100 (A) Negative mg/dL    Bilirubin Small (A) Negative    Urobilinogen, Urine 1.0 Negative    Nitrite Negative Negative    Leukocyte Esterase Small (A) Negative    Occult Blood Moderate (A) Negative    Micro Urine Req Microscopic    APTT   Result Value Ref Range    APTT 40.2 (H) 24.7 - 36.0 sec   PROTHROMBIN TIME (INR)   Result Value Ref Range    PT 18.5 (H) 12.0 - 14.6 sec    INR 1.52 (H) 0.87 - 1.13   C Reactive Protein Quantitative (Non-Cardiac)   Result  Value Ref Range    Stat C-Reactive Protein 10.03 (H) 0.00 - 0.75 mg/dL   Lactic Acid   Result Value Ref Range    Lactic Acid 1.3 0.5 - 2.0 mmol/L   IMMATURE PLT FRACTION   Result Value Ref Range    Imm. Plt Fraction 2.6 0.6 - 13.1 %   URINE MICROSCOPIC (W/UA)   Result Value Ref Range    WBC 5-10 (A) /hpf    RBC 10-20 (A) /hpf    Bacteria Negative None /hpf    Epithelial Cells Few /hpf    Epithelial Cells Renal Few /hpf    Hyaline Cast 11-20 (A) /lpf   ESTIMATED GFR   Result Value Ref Range    GFR (CKD-EPI) 62 >60 mL/min/1.73 m 2   All labs reviewed by me.    Radiology:   The attending Emergency Physician has independently interpreted the diagnostic imaging associated with this visit and is awaiting the final reading from the radiologist, which will be displayed below.    Preliminary interpretation is a follows: CT is pending  Radiologist interpretation:    CT-KNEE W/O LEFT    (Results Pending)         COURSE & MEDICAL DECISION MAKING    ED Observation Status? Yes; I am placing the patient in to an observation status due to a diagnostic uncertainty as well as therapeutic intensity. Patient placed in observation status at 7:24 PM, 10/23/2023.     Observation plan is as follows: Will perform lab work and imaging for further evaluation of her symptoms.     Upon Reevaluation, the patient's condition has: not improved; and will be escalated to hospitalization.    INITIAL ASSESSMENT AND PLAN  Care Narrative:       7:11 PM - Patient seen and evaluated at bedside. Discussed plan of care, including performing lab work and imaging. Patient agrees to plan of care. Patient will be treated with Ancef 2 g, Bolus, Zofran 4 mg, Toradol 30 mg, morphine 4 mg, and fluids for her symptoms. Ordered CMP, CBC w/ Diff., Urinalysis, Blood Culture x2, Prothrombin Time, APTT, Sed Rate, C Reactive Protein Quantitative, and CT-Knee w/o (Left) to evaluate. Differential diagnoses include but are not limited to: Septic joint and cellulitis.    Patient's laboratories show a normal white blood cell count, potassium is slightly low at 3.3, urinalysis is positive for UTI, C-reactive protein is elevated 10.  She was treated with Ancef 2 g IV piggyback.  She will require hospital admission.    9:49 PM I discussed the patient's case and the above findings with Dr. Barth who will admit the patient for further treatment.      HYDRATION: Based on the patient's presentation of Inability to take oral fluids and Sepsis the patient was given IV fluids. IV Hydration was used because oral hydration was not adequate alone. Upon recheck following hydration, the patient was improved.    ADDITIONAL PROBLEM LIST AND DISPOSITION  COPD, hypertension, clotting disorder, hard of hearing               DISPOSITION AND DISCUSSIONS  I have discussed management of the patient with the following physicians and CANDIDO's: Dr. Barth    Discussion of management with other [facility] or appropriate source(s): None     Barriers to care at this time, including but not limited to:  None known .     Decision tools and prescription drugs considered including, but not limited to: Patient was given IV antibiotics, pain control and will be admitted into the hospital for further evaluation and treatment..    DISPOSITION:  Patient will be hospitalized by Dr. Barth in guarded condition.     FINAL IMPRESSION   1. Acute urinary tract infection    2. Hypokalemia    3. Acute pain of left knee    4. Cellulitis of left knee      ISri (Scribe), am scribing for, and in the presence of, Maryanne Lauren D.O..    Electronically signed by: Sri Mcginnis (Scribe), 10/23/2023    IMaryanne D.O. personally performed the services described in this documentation, as scribed by Sri Mcginnis in my presence, and it is both accurate and complete.    The note accurately reflects work and decisions made by me.  Maryanne Lauren D.O.  10/24/2023  4:12 AM

## 2023-10-24 NOTE — PROGRESS NOTES
4 Eyes Skin Assessment Completed by DANYEL Kearns and DANYEL Escobar.    Head WDL  Ears WDL  Nose WDL  Mouth WDL  Neck WDL  Breast/Chest WDL  Shoulder Blades WDL  Spine WDL  (R) Arm/Elbow/Hand WDL  (L) Arm/Elbow/Hand WDL  Abdomen WDL  Groin WDL  Scrotum/Coccyx/Buttocks WDL  (R) Leg Swelling  (L) Leg Swelling  (R) Heel/Foot/Toe WDL  (L) Heel/Foot/Toe WDL          Devices In Places       Interventions In Place N/A    Possible Skin Injury No    Pictures Uploaded Into Epic N/A  Wound Consult Placed N/A  RN Wound Prevention Protocol Ordered No

## 2023-10-24 NOTE — H&P
Hospital Medicine History & Physical Note    Date of Service  10/23/2023    Primary Care Physician  Anum Gatica M.D.        Code Status  Full Code    Chief Complaint  Chief Complaint   Patient presents with    Leg Pain     Left knee swelling x1 week        History of Presenting Illness  April Alicia is a 63 y.o. female with history of hypertension, diabetes, GERD, A-fib  and COPD and bilateral total knee replacement who presented 10/23/2023 with complaints of left leg pain onset 4 days ago, swelling and redness.  She had fever with Tmax 102.  She has been taking Augmentin from 10/5/2023.  Patient started on cefazolin in ER.  Currently CT of the left knee is pending.  Subsequently orthopedic surgery consultation will be required.    I discussed the plan of care with patient and ERP .    Review of Systems  Review of Systems   Constitutional:  Positive for chills, fever and malaise/fatigue. Negative for weight loss.   HENT:  Negative for ear pain, hearing loss and tinnitus.    Eyes:  Negative for blurred vision, double vision and photophobia.   Respiratory:  Negative for cough, hemoptysis and sputum production.    Cardiovascular:  Negative for chest pain, palpitations and orthopnea.   Gastrointestinal:  Negative for heartburn, nausea and vomiting.   Genitourinary:  Negative for dysuria, flank pain, frequency and hematuria.   Musculoskeletal:  Positive for joint pain. Negative for back pain and neck pain.   Skin:  Negative for itching and rash.   Neurological:  Negative for tremors, speech change, focal weakness and headaches.   Endo/Heme/Allergies:  Negative for environmental allergies and polydipsia. Does not bruise/bleed easily.   Psychiatric/Behavioral:  Negative for hallucinations and substance abuse. The patient is not nervous/anxious.        Past Medical History   has a past medical history of Arrhythmia, Arthritis, ASTHMA, Blood clotting disorder (HCC) (2018), Dental disorder, Diabetes, Emphysema  of lung (HCC), Heart abnormality, Heart burn, Heart valve disease, Hypertension, Infection (9/4/2017), Infection (2018), Liver disease, Pneumonia (02/2020), and Seizure disorder (HCC).    Surgical History   has a past surgical history that includes gyn surgery (1982); gyn surgery (2003); colonoscopy with clipping (10/28/2015); colonoscopy with sclerotherapy (10/28/2015); colonoscopy with tattooing (10/28/2015); irrigation & debridement ortho (Right, 9/3/2017); knee arthroplasty total (Right, 2018); pr total knee arthroplasty (Left, 8/24/2020); irrigation & debridement general (Left, 7/17/2022); pr revise knee joint replace,all parts (Left, 7/19/2022); pr revise knee joint replace,all parts (Left, 12/28/2022); and irrigation & debridement general (Left, 12/28/2022).     Family History  family history is not on file.   Family history reviewed with patient. There is no family history that is pertinent to the chief complaint.     Social History   reports that she quit smoking about 6 years ago. Her smoking use included cigarettes. She quit smokeless tobacco use about 2 years ago. She reports that she does not currently use alcohol. She reports that she does not currently use drugs.    Allergies  Allergies   Allergen Reactions    Nkda [No Known Drug Allergy]        Medications  Prior to Admission Medications   Prescriptions Last Dose Informant Patient Reported? Taking?   Cholecalciferol (VITAMIN D3) 50 MCG (2000 UT) Tab  Patient Yes No   Sig: Take 2,000 Units by mouth every day.   REFRESH TEARS 0.5 % Solution  Patient Yes No   Sig: Administer 2 Drops into both eyes as needed (dry eyes).   SPIRIVA HANDIHALER 18 MCG Cap  Patient Yes No   Sig: Place 1 Capsule into inhaler and inhale every day.   amoxicillin-clavulanate (AUGMENTIN) 875-125 MG Tab   No No   Sig: Take 1 Tablet by mouth 2 times a day for 57 days.   atorvastatin (LIPITOR) 20 MG Tab  Patient No No   Sig: Take 1 Tablet by mouth every evening.   bacitracin 500  UNIT/GM ointment   Yes No   bupivacaine-0.5%-epinephrine 1:508907 PF (SENSORCAINE-MPF/EPINEPHRINE) 0.5% -1:[id number] Solution   Yes No   calcium/vitamin D (OYSTER SHELL CALCIUM/D) 500-200 MG-UNIT Tab   Yes No   Si  twice a day by oral route.   ferrous sulfate 325 (65 Fe) MG tablet  Patient Yes No   Sig: Take 325 mg by mouth every day.   furosemide (LASIX) 20 MG Tab   No No   Sig: Take 1 Tablet by mouth every day.   lactulose 20 GM/30ML Solution  Patient No No   Sig: Take 15 mL by mouth every day.   losartan (COZAAR) 100 MG Tab   Yes No   magnesium oxide (MAG-OX) 400 MG Tab tablet  Patient Yes No   Sig: Take 400 mg by mouth every day.   metoprolol SR (TOPROL XL) 25 MG TABLET SR 24 HR   No No   Sig: Take 1 Tablet by mouth every evening.   pantoprazole (PROTONIX) 20 MG tablet  Patient Yes No   Sig: Take 20 mg by mouth every day.   potassium chloride (MICRO-K) 10 MEQ capsule   No No   Sig: Take 1 Capsule by mouth every day.   senna-docusate (PERICOLACE OR SENOKOT S) 8.6-50 MG Tab   No No   Sig: Take 2 Tablets by mouth 2 times a day.   sodium bicarbonate (SODIUM BICARBONATE) 650 MG Tab  Patient No No   Sig: Take 2 Tablets by mouth 2 times a day.   zinc sulfate (ZINCATE) 220 (50 Zn) MG Cap  Patient No No   Sig: Take 1 Capsule by mouth every day.      Facility-Administered Medications: None       Physical Exam  Temp:  [36.9 °C (98.5 °F)] 36.9 °C (98.5 °F)  Pulse:  [85] 85  Resp:  [14-16] 16  BP: (115-138)/(68-79) 138/68  SpO2:  [94 %-96 %] 96 %  Blood Pressure: 138/68   Temperature: 36.9 °C (98.5 °F)   Pulse: 85   Respiration: 16   Pulse Oximetry: 96 %       Physical Exam  Vitals and nursing note reviewed.   Constitutional:       General: She is not in acute distress.     Appearance: Normal appearance.   HENT:      Head: Normocephalic and atraumatic.      Nose: Nose normal.      Mouth/Throat:      Mouth: Mucous membranes are moist.   Eyes:      Extraocular Movements: Extraocular movements intact.      Pupils: Pupils are  "equal, round, and reactive to light.   Cardiovascular:      Rate and Rhythm: Normal rate and regular rhythm.   Pulmonary:      Effort: Pulmonary effort is normal.      Breath sounds: Normal breath sounds.   Abdominal:      General: Abdomen is flat. There is no distension.      Tenderness: There is no abdominal tenderness.   Musculoskeletal:         General: No swelling or deformity.      Cervical back: Normal range of motion and neck supple.      Left knee: Erythema present. Decreased range of motion. Tenderness present.   Skin:     General: Skin is warm and dry.   Neurological:      General: No focal deficit present.      Mental Status: She is alert and oriented to person, place, and time.   Psychiatric:         Mood and Affect: Mood normal.         Behavior: Behavior normal.         Laboratory:  Recent Labs     10/23/23  1941   WBC 6.5   RBC 4.08*   HEMOGLOBIN 11.9*   HEMATOCRIT 36.1*   MCV 88.5   MCH 29.2   MCHC 33.0   RDW 49.4   PLATELETCT 82*   MPV 10.4     Recent Labs     10/23/23  1941   SODIUM 130*   POTASSIUM 3.3*   CHLORIDE 100   CO2 20   GLUCOSE 155*   BUN 18   CREATININE 1.01   CALCIUM 7.9*     Recent Labs     10/23/23  1941   ALTSGPT 11   ASTSGOT 18   ALKPHOSPHAT 118*   TBILIRUBIN 1.8*   GLUCOSE 155*     Recent Labs     10/23/23  1941   APTT 40.2*   INR 1.52*     No results for input(s): \"NTPROBNP\" in the last 72 hours.      No results for input(s): \"TROPONINT\" in the last 72 hours.    Imaging:  CT-KNEE W/O LEFT    (Results Pending)           Assessment/Plan:  Justification for Admission Status  I anticipate this patient will require at least two midnights for appropriate medical management, necessitating inpatient admission because possible septic joint    Patient will need a Med/Surg bed on MEDICAL service .  The need is secondary to possible septic joint.    * Knee pain- (present on admission)  Assessment & Plan  Concern for cellulitis or septic joint  Continue empiric antibiotics  Follow-up with CT " of the left knee and consider orthopedic surgery consult  Pain control  Fall precautions    Paroxysmal atrial fibrillation, hx of- (present on admission)  Assessment & Plan  Noted, not on anticoagulation    Hypertension- (present on admission)  Assessment & Plan  Continue losartan, metoprolol with holding parameters  IV labetalol as needed        VTE prophylaxis: enoxaparin ppx

## 2023-10-24 NOTE — RESPIRATORY CARE
COPD EDUCATION by COPD CLINICAL EDUCATOR  10/24/2023 at 8:45 AM by Diane Garcia RRT     Patient reviewed by COPD education team. Patient does not have a history or diagnosis of COPD and is a non-smoker.  Therefore, patient does not qualify for the COPD program.

## 2023-10-24 NOTE — ASSESSMENT & PLAN NOTE
Concern for cellulitis or septic joint  Hx left knee replacement with recurrent infections  Ortho consulted  CT shows left knee effusion  Knee aspiration 10/24, follow up cultures  Repeat knee aspiration done 10/26, PCR positive for pseudomonas  Start cefepime  Plan for knee washout today per ortho, follow up post op rec's  ID and ortho following, appreciate recommendations

## 2023-10-24 NOTE — ASSESSMENT & PLAN NOTE
Home blood pressure medications were held due to hypotension and shock and will be restarted accordingly based on her blood pressure

## 2023-10-25 LAB
ANION GAP SERPL CALC-SCNC: 10 MMOL/L (ref 7–16)
BUN SERPL-MCNC: 15 MG/DL (ref 8–22)
CALCIUM SERPL-MCNC: 7.8 MG/DL (ref 8.5–10.5)
CHLORIDE SERPL-SCNC: 101 MMOL/L (ref 96–112)
CO2 SERPL-SCNC: 19 MMOL/L (ref 20–33)
CREAT SERPL-MCNC: 0.75 MG/DL (ref 0.5–1.4)
ERYTHROCYTE [DISTWIDTH] IN BLOOD BY AUTOMATED COUNT: 49.7 FL (ref 35.9–50)
GFR SERPLBLD CREATININE-BSD FMLA CKD-EPI: 89 ML/MIN/1.73 M 2
GLUCOSE BLD STRIP.AUTO-MCNC: 122 MG/DL (ref 65–99)
GLUCOSE BLD STRIP.AUTO-MCNC: 139 MG/DL (ref 65–99)
GLUCOSE BLD STRIP.AUTO-MCNC: 141 MG/DL (ref 65–99)
GLUCOSE SERPL-MCNC: 108 MG/DL (ref 65–99)
HCT VFR BLD AUTO: 36.7 % (ref 37–47)
HGB BLD-MCNC: 11.8 G/DL (ref 12–16)
MCH RBC QN AUTO: 28.7 PG (ref 27–33)
MCHC RBC AUTO-ENTMCNC: 32.2 G/DL (ref 32.2–35.5)
MCV RBC AUTO: 89.3 FL (ref 81.4–97.8)
PLATELET # BLD AUTO: 89 K/UL (ref 164–446)
PMV BLD AUTO: 11.5 FL (ref 9–12.9)
POTASSIUM SERPL-SCNC: 4.3 MMOL/L (ref 3.6–5.5)
RBC # BLD AUTO: 4.11 M/UL (ref 4.2–5.4)
SODIUM SERPL-SCNC: 130 MMOL/L (ref 135–145)
WBC # BLD AUTO: 4.8 K/UL (ref 4.8–10.8)

## 2023-10-25 PROCEDURE — 90471 IMMUNIZATION ADMIN: CPT

## 2023-10-25 PROCEDURE — 3E02340 INTRODUCTION OF INFLUENZA VACCINE INTO MUSCLE, PERCUTANEOUS APPROACH: ICD-10-PCS | Performed by: STUDENT IN AN ORGANIZED HEALTH CARE EDUCATION/TRAINING PROGRAM

## 2023-10-25 PROCEDURE — 99255 IP/OBS CONSLTJ NEW/EST HI 80: CPT | Performed by: INTERNAL MEDICINE

## 2023-10-25 PROCEDURE — 80048 BASIC METABOLIC PNL TOTAL CA: CPT

## 2023-10-25 PROCEDURE — 85027 COMPLETE CBC AUTOMATED: CPT

## 2023-10-25 PROCEDURE — 700102 HCHG RX REV CODE 250 W/ 637 OVERRIDE(OP): Performed by: INTERNAL MEDICINE

## 2023-10-25 PROCEDURE — 82962 GLUCOSE BLOOD TEST: CPT | Mod: 91

## 2023-10-25 PROCEDURE — 36415 COLL VENOUS BLD VENIPUNCTURE: CPT

## 2023-10-25 PROCEDURE — 99232 SBSQ HOSP IP/OBS MODERATE 35: CPT | Performed by: STUDENT IN AN ORGANIZED HEALTH CARE EDUCATION/TRAINING PROGRAM

## 2023-10-25 PROCEDURE — 770006 HCHG ROOM/CARE - MED/SURG/GYN SEMI*

## 2023-10-25 PROCEDURE — 90686 IIV4 VACC NO PRSV 0.5 ML IM: CPT | Performed by: STUDENT IN AN ORGANIZED HEALTH CARE EDUCATION/TRAINING PROGRAM

## 2023-10-25 PROCEDURE — A9270 NON-COVERED ITEM OR SERVICE: HCPCS | Performed by: INTERNAL MEDICINE

## 2023-10-25 PROCEDURE — 700111 HCHG RX REV CODE 636 W/ 250 OVERRIDE (IP): Mod: JZ | Performed by: INTERNAL MEDICINE

## 2023-10-25 PROCEDURE — 700101 HCHG RX REV CODE 250: Performed by: INTERNAL MEDICINE

## 2023-10-25 PROCEDURE — 700111 HCHG RX REV CODE 636 W/ 250 OVERRIDE (IP): Performed by: STUDENT IN AN ORGANIZED HEALTH CARE EDUCATION/TRAINING PROGRAM

## 2023-10-25 RX ORDER — DIPHENHYDRAMINE HYDROCHLORIDE, ZINC ACETATE 2; .1 G/100G; G/100G
CREAM TOPICAL 3 TIMES DAILY PRN
Status: DISCONTINUED | OUTPATIENT
Start: 2023-10-25 | End: 2023-11-03 | Stop reason: HOSPADM

## 2023-10-25 RX ADMIN — POTASSIUM CHLORIDE 10 MEQ: 20 TABLET, EXTENDED RELEASE ORAL at 04:47

## 2023-10-25 RX ADMIN — OXYCODONE HYDROCHLORIDE 10 MG: 10 TABLET ORAL at 03:04

## 2023-10-25 RX ADMIN — OXYCODONE HYDROCHLORIDE 10 MG: 10 TABLET ORAL at 07:30

## 2023-10-25 RX ADMIN — TIOTROPIUM BROMIDE INHALATION SPRAY 5 MCG: 3.12 SPRAY, METERED RESPIRATORY (INHALATION) at 07:31

## 2023-10-25 RX ADMIN — DOCUSATE SODIUM 50 MG AND SENNOSIDES 8.6 MG 2 TABLET: 8.6; 5 TABLET, FILM COATED ORAL at 16:57

## 2023-10-25 RX ADMIN — CEFTRIAXONE SODIUM 2000 MG: 10 INJECTION, POWDER, FOR SOLUTION INTRAVENOUS at 18:13

## 2023-10-25 RX ADMIN — FUROSEMIDE 20 MG: 40 TABLET ORAL at 04:46

## 2023-10-25 RX ADMIN — OMEPRAZOLE 20 MG: 20 CAPSULE, DELAYED RELEASE ORAL at 04:47

## 2023-10-25 RX ADMIN — INFLUENZA A VIRUS A/VICTORIA/4897/2022 IVR-238 (H1N1) ANTIGEN (FORMALDEHYDE INACTIVATED), INFLUENZA A VIRUS A/DARWIN/9/2021 SAN-010 (H3N2) ANTIGEN (FORMALDEHYDE INACTIVATED), INFLUENZA B VIRUS B/PHUKET/3073/2013 ANTIGEN (FORMALDEHYDE INACTIVATED), AND INFLUENZA B VIRUS B/MICHIGAN/01/2021 ANTIGEN (FORMALDEHYDE INACTIVATED) 0.5 ML: 15; 15; 15; 15 INJECTION, SUSPENSION INTRAMUSCULAR at 16:58

## 2023-10-25 RX ADMIN — MORPHINE SULFATE 4 MG: 4 INJECTION, SOLUTION INTRAMUSCULAR; INTRAVENOUS at 04:47

## 2023-10-25 RX ADMIN — OXYCODONE HYDROCHLORIDE 10 MG: 10 TABLET ORAL at 16:56

## 2023-10-25 RX ADMIN — ATORVASTATIN CALCIUM 20 MG: 20 TABLET, FILM COATED ORAL at 21:05

## 2023-10-25 RX ADMIN — LOSARTAN POTASSIUM 50 MG: 50 TABLET, FILM COATED ORAL at 04:47

## 2023-10-25 RX ADMIN — METOPROLOL SUCCINATE 25 MG: 25 TABLET, EXTENDED RELEASE ORAL at 21:05

## 2023-10-25 RX ADMIN — OXYCODONE HYDROCHLORIDE 10 MG: 10 TABLET ORAL at 02:24

## 2023-10-25 RX ADMIN — ENOXAPARIN SODIUM 40 MG: 100 INJECTION SUBCUTANEOUS at 17:00

## 2023-10-25 RX ADMIN — OXYCODONE HYDROCHLORIDE 10 MG: 10 TABLET ORAL at 21:05

## 2023-10-25 RX ADMIN — OXYCODONE HYDROCHLORIDE 10 MG: 10 TABLET ORAL at 11:48

## 2023-10-25 RX ADMIN — DOCUSATE SODIUM 50 MG AND SENNOSIDES 8.6 MG 2 TABLET: 8.6; 5 TABLET, FILM COATED ORAL at 04:47

## 2023-10-25 ASSESSMENT — ENCOUNTER SYMPTOMS
FOCAL WEAKNESS: 0
WEAKNESS: 0
DIZZINESS: 0
DIARRHEA: 0
BACK PAIN: 0
ABDOMINAL PAIN: 0
NAUSEA: 0
SHORTNESS OF BREATH: 0
INSOMNIA: 0
EYE PAIN: 0
COUGH: 0
DOUBLE VISION: 0
CONSTIPATION: 0
SPUTUM PRODUCTION: 0
FEVER: 0
NERVOUS/ANXIOUS: 0
HEADACHES: 0
PALPITATIONS: 0
BLURRED VISION: 0
CHILLS: 0
FALLS: 0
VOMITING: 0
MYALGIAS: 1
SENSORY CHANGE: 0

## 2023-10-25 ASSESSMENT — PAIN DESCRIPTION - PAIN TYPE
TYPE: ACUTE PAIN

## 2023-10-25 ASSESSMENT — LIFESTYLE VARIABLES: SUBSTANCE_ABUSE: 0

## 2023-10-25 NOTE — CARE PLAN
The patient is Stable - Low risk of patient condition declining or worsening    Shift Goals  Clinical Goals: Pain Control, ID consult  Patient Goals: Pain Control, Updated POC  Family Goals: gisele    Progress made toward(s) clinical / shift goals:    Problem: Communication  Goal: The ability to communicate needs accurately and effectively will improve  Outcome: Progressing  Note: Plan of Care discussed, all questions answered. Pt effectively communicates needs to staff.       Problem: Mobility  Goal: Patient's capacity to carry out activities will improve  Outcome: Progressing  Note: Pt is SBA with [facility].        Patient is not progressing towards the following goals:

## 2023-10-25 NOTE — PROGRESS NOTES
Pt states she was exposed to covid by a friend a few days ago but is not having any symptoms. Asked MD mistry if she would like covid test done. No test ordered at this time

## 2023-10-25 NOTE — PROGRESS NOTES
Hospital Medicine Daily Progress Note    Date of Service  10/25/2023    Chief Complaint  April Alicia is a 63 y.o. female admitted 10/23/2023 with knee pain.    Hospital Course  April Alicia is a 63 y.o. female with history of hypertension, diabetes, GERD, A-fib  and COPD and bilateral total knee replacement who presented 10/23/2023 with complaints of left leg pain onset 4 days ago, swelling and redness.  She had fever with Tmax 102.  She has been taking Augmentin from 10/5/2023.  Patient started on cefazolin in ER.  Currently CT of the left knee is pending.  Subsequently orthopedic surgery consultation will be required.    Interval Problem Update  No acute events overnight.  S/p knee aspiration, however sample unable to be cultured.  ID requesting new aspiration cultures. Will ask ortho.  Continue ceftriaxone.  Appreciate surgery recommendations and plan of care.      I have discussed this patient's plan of care and discharge plan at IDT rounds today with Case Management, Nursing, Nursing leadership, and other members of the IDT team.    Consultants/Specialty  orthopedics    Code Status  Full Code    Disposition  The patient is not medically cleared for discharge to home or a post-acute facility.      I have placed the appropriate orders for post-discharge needs.    Review of Systems  Review of Systems   Constitutional:  Negative for chills and fever.   Eyes:  Negative for blurred vision and pain.   Respiratory:  Negative for cough and shortness of breath.    Cardiovascular:  Negative for chest pain, palpitations and leg swelling.   Gastrointestinal:  Negative for abdominal pain, nausea and vomiting.   Genitourinary:  Negative for dysuria and urgency.   Musculoskeletal:  Positive for joint pain. Negative for back pain and falls.   Skin:  Negative for itching and rash.   Neurological:  Negative for dizziness, sensory change, focal weakness and headaches.   Psychiatric/Behavioral:  Negative  for substance abuse. The patient does not have insomnia.         Physical Exam  Temp:  [36.3 °C (97.4 °F)-36.6 °C (97.8 °F)] 36.6 °C (97.8 °F)  Pulse:  [77-82] 77  Resp:  [17-20] 17  BP: (108-151)/(49-90) 108/49  SpO2:  [92 %-94 %] 92 %    Physical Exam  Vitals reviewed.   Constitutional:       General: She is not in acute distress.     Appearance: She is not diaphoretic.   HENT:      Head: Normocephalic and atraumatic.      Right Ear: External ear normal.      Left Ear: External ear normal.      Nose: Nose normal. No congestion.      Mouth/Throat:      Pharynx: No oropharyngeal exudate or posterior oropharyngeal erythema.   Eyes:      Extraocular Movements: Extraocular movements intact.      Pupils: Pupils are equal, round, and reactive to light.   Cardiovascular:      Rate and Rhythm: Normal rate and regular rhythm.   Pulmonary:      Effort: Pulmonary effort is normal. No respiratory distress.      Breath sounds: Normal breath sounds. No wheezing.   Abdominal:      General: Bowel sounds are normal. There is no distension.      Palpations: Abdomen is soft.      Tenderness: There is no abdominal tenderness.   Musculoskeletal:         General: Swelling and tenderness present. Normal range of motion.      Cervical back: Normal range of motion and neck supple.      Comments: Left knee swelling with warmth and pain to palpation, no open wounds   Skin:     General: Skin is warm and dry.   Neurological:      General: No focal deficit present.      Mental Status: She is alert and oriented to person, place, and time.      Cranial Nerves: No cranial nerve deficit.      Sensory: No sensory deficit.      Motor: No weakness.   Psychiatric:         Mood and Affect: Mood normal.         Behavior: Behavior normal.         Fluids    Intake/Output Summary (Last 24 hours) at 10/25/2023 1638  Last data filed at 10/25/2023 1400  Gross per 24 hour   Intake 840 ml   Output 200 ml   Net 640 ml       Laboratory  Recent Labs      10/23/23  1941 10/24/23  0503 10/25/23  0522   WBC 6.5 5.4 4.8   RBC 4.08* 3.44* 4.11*   HEMOGLOBIN 11.9* 10.2* 11.8*   HEMATOCRIT 36.1* 30.9* 36.7*   MCV 88.5 89.8 89.3   MCH 29.2 29.7 28.7   MCHC 33.0 33.0 32.2   RDW 49.4 49.8 49.7   PLATELETCT 82* 74* 89*   MPV 10.4 11.6 11.5     Recent Labs     10/23/23  1941 10/24/23  0503 10/25/23  0522   SODIUM 130* 131* 130*   POTASSIUM 3.3* 3.5* 4.3   CHLORIDE 100 101 101   CO2 20 23 19*   GLUCOSE 155* 112* 108*   BUN 18 19 15   CREATININE 1.01 0.99 0.75   CALCIUM 7.9* 7.3* 7.8*     Recent Labs     10/23/23  1941   APTT 40.2*   INR 1.52*               Imaging  CT-KNEE W/O LEFT   Final Result         1.  Moderate knee joint effusion, quantity of fluid appears significantly increased since prior study, noncontrast technique limits characterization of fluid.   2.  No acute traumatic bony injury identified.   3.  Slight lucency at the tibial arthroplasty component suggests possible component of hardware loosening.   4.  Atherosclerosis           Assessment/Plan  * Knee pain- (present on admission)  Assessment & Plan  Concern for cellulitis or septic joint  Hx left knee replacement with recurrent infections  Ortho consulted  CT shows left knee effusion  Knee aspiration 10/24, follow up cultures  Not a good surgical candidate per surgery  ID consulted, request new knee cultures  Continue antibiotics    Type 2 diabetes mellitus (HCC)- (present on admission)  Assessment & Plan  A1c 5.4  Monitor blood sugar daily.  Insulin sliding scale    Paroxysmal atrial fibrillation, hx of- (present on admission)  Assessment & Plan  Noted, not on anticoagulation    Hypertension- (present on admission)  Assessment & Plan  Continue losartan, metoprolol with holding parameters  IV labetalol as needed         VTE prophylaxis:    enoxaparin ppx

## 2023-10-25 NOTE — CARE PLAN
Problem: Pain - Standard  Goal: Alleviation of pain or a reduction in pain to the patient’s comfort goal  Outcome: Progressing     Problem: Knowledge Deficit - Standard  Goal: Patient and family/care givers will demonstrate understanding of plan of care, disease process/condition, diagnostic tests and medications  Outcome: Progressing     Problem: Fall Risk  Goal: Patient will remain free from falls  Outcome: Progressing   The patient is Stable - Low risk of patient condition declining or worsening    Shift Goals  Clinical Goals: abx, pain management  Patient Goals: rest  Family Goals: gisele    Progress made toward(s) clinical / shift goals:  pt progressing toward goals    Patient is not progressing towards the following goals:

## 2023-10-25 NOTE — CARE PLAN
Problem: Pain - Standard  Goal: Alleviation of pain or a reduction in pain to the patient’s comfort goal  Outcome: Progressing     Problem: Knowledge Deficit - Standard  Goal: Patient and family/care givers will demonstrate understanding of plan of care, disease process/condition, diagnostic tests and medications  Outcome: Progressing     Problem: Fall Risk  Goal: Patient will remain free from falls  Outcome: Progressing   The patient is Stable - Low risk of patient condition declining or worsening    Shift Goals  Clinical Goals: abx, pain management  Patient Goals: rest  Family Goals: gisele    Progress made toward(s) clinical / shift goals:      Patient is not progressing towards the following goals:

## 2023-10-25 NOTE — CONSULTS
"Consults  INFECTIOUS DISEASES INPATIENT CONSULT NOTE     Date of Service: 10/25/2023    Consult Requested By: Grant Richards M.D.    Reason for Consultation: Left knee prosthetic joint infection    History of Present Illness:   April Alicia is a 63 y.o.  admitted 10/23/2023.  She was last seen in ID clinic on 10/5/2023: \"Pt has a past medical history of alcohol and cocaine abuse, cirrhosis, asthma, A-fib on apixaban, 2 diabetes, hypertension. Patient was admitted on July 2022 with a group B strep bacteremia and left knee prosthetic joint infection along with a left shoulder septic arthritis and some subdeltoid abscess.  Plan was for her to discharge to a skilled nursing facility and continue IV ceftriaxone for 6 weeks until 8/30/2022 due to some unknown reason patient only received 4 weeks of antibiotics.  Patient was readmitted on 12/26/2022 due to recurrent left knee pain and swelling.  She was taken back to the OR on 12/28 underwent explantation and placement of articulating  spacer, wound VAC.  Multiple OR cultures again growing group B Strep.   Underwent synovial fluid removal from the left shoulder on 1/2/2022 WBC 1051, 40% polys, 40% lymphs, not consistent with infection.  Streptococcus group B (penicillin sensitive). Plan at discharge with 6 weeks of IV antibiotics ceftriaxone 2 g daily till 2/7/2023. Orthopedic MD Garcia note recommending patient continue oral antibiotics for at least 1 year prior to reimplantation as she is undergone 2 I&D's in the past with 2 antibiotic spacers which were both failed.  She is at high risk for failing stage II reimplantation and will be subject to amputation if failed again.  At the time ID recommended ongoing Augmentin with possible end date of 12/1/2023.  She is now admitted complaining of worsening pain and swelling in the left knee after she reports that a drunk person in the park fell on it.     Hospital Course:   Afebrile, no leukocytosis, ongoing " thrombocytopenia, chronic.  Aspiration of the knee joint on 10/24/2023 consistent with prosthetic joint infection- WBC 32,200, 94% polys, no crystals    Review Of Systems:  Review of Systems   Constitutional:  Negative for chills and fever.   HENT:  Negative for hearing loss.    Eyes:  Negative for blurred vision and double vision.   Respiratory:  Negative for cough, sputum production and shortness of breath.    Cardiovascular:  Negative for chest pain.   Gastrointestinal:  Negative for abdominal pain, constipation, diarrhea, nausea and vomiting.   Genitourinary:  Negative for dysuria.   Musculoskeletal:  Positive for joint pain and myalgias.   Skin:  Negative for rash.   Neurological:  Negative for weakness.   Psychiatric/Behavioral:  The patient is not nervous/anxious.        PMH:   Past Medical History:   Diagnosis Date    Arrhythmia     Arthritis     OA in knees    ASTHMA     Blood clotting disorder (HCC) 2018    right leg blood clot    Dental disorder     upper and lower dentures    Diabetes     Emphysema of lung (HCC)     Heart abnormality     leakage in left valve    Heart burn     left knee    Heart valve disease     Hypertension     Infection 9/4/2017    Infection 2018    Right knee after total knee    Liver disease     Pneumonia 02/2020    Seizure disorder ([facility])        PSH:  Past Surgical History:   Procedure Laterality Date    PB REVISE KNEE JOINT REPLACE,ALL PARTS Left 12/28/2022    Procedure: REVISION, TOTAL ARTHROPLASTY, KNEE, ALL COMPONENTS-LEFT;  Surgeon: Wild Luz M.D.;  Location: [facility];  Service: Orthopedics    IRRIGATION & DEBRIDEMENT GENERAL Left 12/28/2022    Procedure: IRRIGATION AND DEBRIDEMENT, WOUND;  Surgeon: Wild Luz M.D.;  Location: [facility];  Service: Orthopedics    PB REVISE KNEE JOINT REPLACE,ALL PARTS Left 7/19/2022    Procedure: REVISION, TOTAL ARTHROPLASTY, KNEE, ALL COMPONENTS - ANTIBIOTIC SPACER;  Surgeon: Wild Luz M.D.;  Location:  SURGERY [facility];  Service: Orthopedics    IRRIGATION & DEBRIDEMENT GENERAL Left 2022    Procedure: IRRIGATION AND DEBRIDEMENT, SHOULDER;  Surgeon: Obdulio Gray M.D.;  Location: SURGERY [facility];  Service: Orthopedics    PB TOTAL KNEE ARTHROPLASTY Left 2020    Procedure: ARTHROPLASTY, KNEE, TOTAL;  Surgeon: Víctor Faustin M.D.;  Location: SURGERY [facility];  Service: Orthopedics    KNEE ARTHROPLASTY TOTAL Right 2018    IRRIGATION & DEBRIDEMENT ORTHO Right 9/3/2017    Procedure: IRRIGATION & DEBRIDEMENT ORTHO, POLY EXCHANGE;  Surgeon: Jerome Russell M.D.;  Location: SURGERY [facility];  Service: Orthopedics    COLONOSCOPY WITH CLIPPING  10/28/2015    Procedure: COLONOSCOPY WITH CLIPPING;  Surgeon: Ruben Colon M.D.;  Location: ENDOSCOPY [facility];  Service:     COLONOSCOPY WITH SCLEROTHERAPY  10/28/2015    Procedure: COLONOSCOPY WITH SCLEROTHERAPY;  Surgeon: Ruben Colon M.D.;  Location: ENDOSCOPY [facility];  Service:     COLONOSCOPY WITH TATTOOING  10/28/2015    Procedure: COLONOSCOPY WITH TATTOOING;  Surgeon: Ruben Colon M.D.;  Location: ENDOSCOPY [facility];  Service:     GYN SURGERY      hysteroscoopy    GYN SURGERY      tubal ligation       FAMILY HX:  No family history on file.  Reviewed family history. No pertinent family history.     SOCIAL HX:  Social History     Socioeconomic History    Marital status: Single     Spouse name: Not on file    Number of children: Not on file    Years of education: Not on file    Highest education level: Not on file   Occupational History    Not on file   Tobacco Use    Smoking status: Former     Current packs/day: 0.00     Types: Cigarettes     Quit date: 2016     Years since quittin.8    Smokeless tobacco: Former     Quit date: 2021    Tobacco comments:     a few a day when I can   Vaping Use    Vaping Use: Never used   Substance and Sexual Activity    Alcohol use: Not Currently    Drug  use: Not Currently    Sexual activity: Not on file   Other Topics Concern    Not on file   Social History Narrative    Not on file     Social Determinants of Health     Financial Resource Strain: Low Risk  (2020)    Overall Financial Resource Strain (CARDIA)     Difficulty of Paying Living Expenses: Not hard at all   Food Insecurity: No Food Insecurity (2020)    Hunger Vital Sign     Worried About Running Out of Food in the Last Year: Never true     Ran Out of Food in the Last Year: Never true   Transportation Needs: No Transportation Needs (2020)    PRAPARE - Transportation     Lack of Transportation (Medical): No     Lack of Transportation (Non-Medical): No   Physical Activity: Not on file   Stress: Not on file   Social Connections: Not on file   Intimate Partner Violence: Not on file   Housing Stability: Not on file     Social History     Tobacco Use   Smoking Status Former    Current packs/day: 0.00    Types: Cigarettes    Quit date: 2016    Years since quittin.8   Smokeless Tobacco Former    Quit date: 2021   Tobacco Comments    a few a day when I can     Social History     Substance and Sexual Activity   Alcohol Use Not Currently       Allergies/Intolerances:  Allergies   Allergen Reactions    Nkda [No Known Drug Allergy]        History reviewed with the patient and /or family member, chart & primary care team    Other Current Medications:    Current Facility-Administered Medications:     insulin regular (HumuLIN R,NovoLIN R) injection, 1-6 Units, Subcutaneous, 4X/DAY ACHS, 1 Units at 10/24/23 1501 **AND** POC blood glucose manual result, , , Q AC AND BEDTIME(S) **AND** NOTIFY MD and PharmD, , , Once **AND** Administer 20 grams of glucose (approximately 8 ounces of fruit juice) every 15 minutes PRN FSBG less than 70 mg/dL, , , PRN **AND** dextrose 10 % BOLUS 25 g, 25 g, Intravenous, Q15 MIN PRN, Shai Barth M.D.    omeprazole (PriLOSEC) capsule 20 mg, 20 mg, Oral, DAILY,  Shai Barth M.D., 20 mg at 10/25/23 0447    tiotropium (Spiriva Respimat) 2.5 mcg/Act inhalation spray 5 mcg, 5 mcg, Inhalation, QDAILY (RT), Shai Barth M.D., 5 mcg at 10/25/23 0731    potassium chloride SA (Kdur) tablet 10 mEq, 10 mEq, Oral, DAILY, Shai Barth M.D., 10 mEq at 10/25/23 0447    senna-docusate (Pericolace Or Senokot S) 8.6-50 MG per tablet 2 Tablet, 2 Tablet, Oral, BID, 2 Tablet at 10/25/23 0447 **AND** polyethylene glycol/lytes (Miralax) PACKET 1 Packet, 1 Packet, Oral, QDAY PRN **AND** magnesium hydroxide (Milk Of Magnesia) suspension 30 mL, 30 mL, Oral, QDAY PRN **AND** bisacodyl (Dulcolax) suppository 10 mg, 10 mg, Rectal, QDAY PRN, Shai Barth M.D.    enoxaparin (Lovenox) inj 40 mg, 40 mg, Subcutaneous, DAILY AT 1800, Shai Barth M.D., 40 mg at 10/24/23 1740    acetaminophen (Tylenol) tablet 650 mg, 650 mg, Oral, Q6HRS PRN, Shai Barth M.D.    Notify provider if pain remains uncontrolled, , , CONTINUOUS **AND** Use the Numeric Rating Scale (NRS), Foley-Baker Faces (WBF), or FLACC on regular floors and Critical-Care Pain Observation Tool (CPOT) on ICUs/Trauma to assess pain, , , CONTINUOUS **AND** Pulse Ox, , , CONTINUOUS **AND** Pharmacy Consult Request ...Pain Management Review 1 Each, 1 Each, Other, PHARMACY TO DOSE **AND** If patient difficult to arouse and/or has respiratory depression (respiratory rate of 10 or less), stop any opiates that are currently infusing and call a Rapid Response., , , CONTINUOUS, Shai Barth M.D.    oxyCODONE immediate-release (Roxicodone) tablet 5 mg, 5 mg, Oral, Q3HRS PRN **OR** oxyCODONE immediate release (Roxicodone) tablet 10 mg, 10 mg, Oral, Q3HRS PRN, 10 mg at 10/25/23 1148 **OR** morphine 4 MG/ML injection 4 mg, 4 mg, Intravenous, Q3HRS PRN, Shai Barth M.D., 4 mg at 10/25/23 0447    labetalol (Normodyne/Trandate) injection 10 mg, 10 mg, Intravenous, Q4HRS PRN, Shai Barth M.D.    ondansetron (Zofran)  "syringe/vial injection 4 mg, 4 mg, Intravenous, Q4HRS PRN, Shai Barth M.D.    ondansetron (Zofran ODT) dispertab 4 mg, 4 mg, Oral, Q4HRS PRN, Shai Barth M.D.    promethazine (Phenergan) tablet 12.5-25 mg, 12.5-25 mg, Oral, Q4HRS PRN, Shai Barth M.D.    promethazine (Phenergan) suppository 12.5-25 mg, 12.5-25 mg, Rectal, Q4HRS PRN, Shai Barth M.D.    prochlorperazine (Compazine) injection 5-10 mg, 5-10 mg, Intravenous, Q4HRS PRN, Shai Barth M.D.    cefTRIAXone (Rocephin) syringe 2,000 mg, 2,000 mg, Intravenous, Q24HRS, Shai Barth M.D., 2,000 mg at 10/24/23 07    atorvastatin (Lipitor) tablet 20 mg, 20 mg, Oral, Nightly, Shai Barth M.D., 20 mg at 10/24/23 2022    furosemide (Lasix) tablet 20 mg, 20 mg, Oral, DAILY, HUGO LangeDErrol, 20 mg at 10/25/23 044    losartan (Cozaar) tablet 50 mg, 50 mg, Oral, DAILY, HUGO LangeDErrol, 50 mg at 10/25/23 044    metoprolol SR (Toprol XL) tablet 25 mg, 25 mg, Oral, Nightly, HUGO LangeDErrol, 25 mg at 10/24/23 2021  [unfilled]    Most Recent Vital Signs:  BP (!) 151/82   Pulse 82   Temp 36.4 °C (97.6 °F) (Temporal)   Resp 17   Ht 1.727 m (5' 8\")   Wt 90 kg (198 lb 6.6 oz)   LMP 2008   SpO2 93%   BMI 30.17 kg/m²   Temp  Av.6 °C (97.9 °F)  Min: 36.3 °C (97.4 °F)  Max: 36.9 °C (98.5 °F)    Physical Exam:  Physical Exam  Constitutional:       Appearance: Normal appearance.   HENT:      Head: Normocephalic and atraumatic.      Right Ear: External ear normal.      Left Ear: External ear normal.      Nose: Nose normal.      Mouth/Throat:      Mouth: Mucous membranes are moist.      Pharynx: Oropharynx is clear.   Eyes:      Extraocular Movements: Extraocular movements intact.      Conjunctiva/sclera: Conjunctivae normal.      Pupils: Pupils are equal, round, and reactive to light.   Cardiovascular:      Rate and Rhythm: Normal rate and regular rhythm.      Heart sounds: Normal heart sounds. "   Pulmonary:      Effort: Pulmonary effort is normal.      Breath sounds: Normal breath sounds.   Abdominal:      General: Abdomen is flat. Bowel sounds are normal.      Palpations: Abdomen is soft.   Musculoskeletal:      Cervical back: Normal range of motion and neck supple.      Comments: Left knee with edema, tenderness, no erythema, mild warmth   Skin:     General: Skin is warm and dry.   Neurological:      General: No focal deficit present.      Mental Status: She is alert and oriented to person, place, and time.   Psychiatric:         Mood and Affect: Mood normal.         Behavior: Behavior normal.           Pertinent Lab Results:  Recent Labs     10/23/23  1941 10/24/23  0503 10/25/23  0522   WBC 6.5 5.4 4.8      Recent Labs     10/23/23  1941 10/24/23  0503 10/25/23  0522   HEMOGLOBIN 11.9* 10.2* 11.8*   HEMATOCRIT 36.1* 30.9* 36.7*   MCV 88.5 89.8 89.3   MCH 29.2 29.7 28.7   PLATELETCT 82* 74* 89*         Recent Labs     10/23/23  1941 10/24/23  0503 10/25/23  0522   SODIUM 130* 131* 130*   POTASSIUM 3.3* 3.5* 4.3   CHLORIDE 100 101 101   CO2 20 23 19*   CREATININE 1.01 0.99 0.75        Recent Labs     10/23/23  1941 10/24/23  0503   ALBUMIN 2.9* 2.5*        Pertinent Micro:  Results       Procedure Component Value Units Date/Time    FLUID CULTURE W/GRAM STAIN [[id number]]     Order Status: No result Specimen: Body Fluid from Synovial Fluid     FLUID CULTURE [[id number]]     Order Status: No result Specimen: Body Fluid from Synovial Fluid     FLUID CULTURE W/GRAM STAIN [[id number]] Collected: 10/24/23 1500    Order Status: Canceled Specimen: Synovial     BLOOD CULTURE [[id number]] Collected: 10/23/23 1941    Order Status: Completed Specimen: Blood from Peripheral Updated: 10/24/23 0804     Significant Indicator NEG     Source BLD     Site PERIPHERAL     Culture Result No Growth  Note: Blood cultures are incubated for 5 days and  are monitored continuously.Positive blood cultures  are called to the RN and  "reported as soon as  they are identified.      Narrative:      Per Hospital Policy: Only change Specimen Src: to \"Line\" if  specified by physician order.  Release to patient->Immediate  Right AC    BLOOD CULTURE [[id number]] Collected: 10/23/23 2009    Order Status: Completed Specimen: Blood from Peripheral Updated: 10/24/23 0804     Significant Indicator NEG     Source BLD     Site PERIPHERAL     Culture Result No Growth  Note: Blood cultures are incubated for 5 days and  are monitored continuously.Positive blood cultures  are called to the RN and reported as soon as  they are identified.      Narrative:      Per Hospital Policy: Only change Specimen Src: to \"Line\" if  specified by physician order.  Release to patient->Immediate  Right Hand    URINALYSIS (UA) [[id number]]  (Abnormal) Collected: 10/23/23 1941    Order Status: Completed Specimen: Urine Updated: 10/23/23 2040     Color DK Yellow     Character Cloudy     Specific Gravity 1.024     Ph 5.0     Glucose Negative mg/dL      Ketones Trace mg/dL      Protein 100 mg/dL      Bilirubin Small     Urobilinogen, Urine 1.0     Nitrite Negative     Leukocyte Esterase Small     Occult Blood Moderate     Micro Urine Req Microscopic    Narrative:      Release to patient->Immediate          Blood Culture   Date Value Ref Range Status   09/04/2017 No growth after 5 days of incubation.  Final     Blood Culture Hold   Date Value Ref Range Status   10/03/2020 Collected  Final        Studies:  CT-KNEE W/O LEFT    Result Date: 10/24/2023    10/24/2023 4:23 AM HISTORY/REASON FOR EXAM:  Leg Pain. TECHNIQUE/ EXAM DESCRIPTION AND NUMBER OF VIEWS:  CT scan of the LEFT knee without contrast, with thin section axial acquisition. Up to date radiation dose reduction adjustments have been utilized to meet ALARA standards for radiation dose reduction. COMPARISON:  April 19, 2023 . FINDINGS: Postsurgical changes of total knee arthroplasty are seen. There is no acute fracture, subluxation, " "or dislocation identified. There appears be slight lucency at the tibial arthroplasty component. There is moderate knee joint effusion noted. Atherosclerotic changes are seen.     1.  Moderate knee joint effusion, quantity of fluid appears significantly increased since prior study, noncontrast technique limits characterization of fluid. 2.  No acute traumatic bony injury identified. 3.  Slight lucency at the tibial arthroplasty component suggests possible component of hardware loosening. 4.  Atherosclerosis      ASSESSMENT/PLAN:     63 y.o.  admitted 10/23/2023.  She was last seen in ID clinic on 10/5/2023: \"Pt has a past medical history of alcohol and cocaine abuse, cirrhosis, asthma, A-fib on apixaban, 2 diabetes, hypertension. Patient was admitted on July 2022 with a group B strep bacteremia and left knee prosthetic joint infection along with a left shoulder septic arthritis and some subdeltoid abscess.  Plan was for her to discharge to a skilled nursing facility and continue IV ceftriaxone for 6 weeks until 8/30/2022 due to some unknown reason patient only received 4 weeks of antibiotics.  Patient was readmitted on 12/26/2022 due to recurrent left knee pain and swelling.  She was taken back to the OR on 12/28 underwent explantation and placement of articulating  spacer, wound VAC.  Multiple OR cultures again growing group B Strep.   Underwent synovial fluid removal from the left shoulder on 1/2/2022 WBC +Streptococcus group B (penicillin sensitive). Plan at discharge with 6 weeks of IV antibiotics ceftriaxone 2 g daily till 2/7/2023. Orthopedic MD Garcia note recommending patient continue oral antibiotics for at least 1 year prior to reimplantation as she is undergone 2 I&D's in the past with 2 antibiotic spacers which were both failed.  She is at high risk for failing stage II reimplantation and will be subject to amputation if failed again.  ID recommended ongoing Augmentin with possible end date of 12/1/2023.  " She is now admitted complaining of worsening pain and swelling in the left knee after she reports that [place] person in the park [place] it.  I am unable to elicit a consistent history as to whether or not she was still taking antibiotics or not.     Hospital Course:   Afebrile, no leukocytosis, ongoing thrombocytopenia, chronic.  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   -Reviewed labs to date, microbiology for current admit and prior  -Imaging was independently reviewed and interpreted    Plan:    Recommendations for antibiotics:   --- Agree with ceftriaxone 2 g every 24 hours    Recommendations for further/ongoing work-up:   --- Follow-up blood cultures, no growth to date  --- Follow-up synovial fluid cultures  --- Ortho following and considering options   --- Synovial fluid for culture was obtained but was placed in a tube which will inactivate cultures so it has been canceled.  Will need new synovial fluid if we wish to obtain cultures.  Please ask orthopedic surgery if they are able to obtain a new culture.  If this is obtained we will need to be placed in proper tube so as not to run into the same problem again.  We could also run a synovial fluid PCR for rapid results.  --- Monitor labs      Dispo: Awaiting culture results and work-up as above.  Patient is at risk for infectious complications  including limb loss.    PICC: TBD      Plan of care discussed with HERB Richards M.D.. Will continue to follow    Aurea Ramos M.D.

## 2023-10-25 NOTE — PROGRESS NOTES
Bedside report recieved, assumed care at 0700.   Pt is A&Ox4, reports 7/10 L knee pain-Medicated per MAR. Reports baseline BLE numbness/tingling.  Denies N/V. Room air. SBA w/ FWW.    Plan of care discussed. All needs met at this time.   Pt instructed to use call light when in need of assistance. Bed alarm on. Bed locked and in low position, call light and belongings within reach, upper rails up. Treaded socks on.

## 2023-10-26 LAB
A PREV+VAG DNA SNV QL NAA+NON-PROBE: NOT DETECTED
APPEARANCE FLD: NORMAL
B FRAGILIS DNA SNV QL NAA+NON-PROBE: NOT DETECTED
BLACTX-M ISLT/SPM QL: NOT DETECTED
BLAIMP ISLT/SPM QL: NOT DETECTED
BLAKPC ISLT/SPM QL: NOT DETECTED
BLAOXA-48-LIKE ISLT/SPM QL: ABNORMAL
BLAVIM ISLT/SPM QL: NOT DETECTED
BODY FLD TYPE: NORMAL
C ALBICANS DNA SNV QL NAA+NON-PROBE: NOT DETECTED
C AVID+GRANUL DNA SNV QL NAA+NON-PROBE: NOT DETECTED
C PERFRINGENS SNV QL NAA+NON-PROBE: NOT DETECTED
CANDIDA DNA SNV QL NAA+NON-PROBE: NOT DETECTED
CITROBAC SP DNA SNV QL NAA+NON-PROBE: NOT DETECTED
COLOR FLD: NORMAL
E CLOAC COMP DNA SNV QL NAA+NON-PROBE: NOT DETECTED
E COLI DNA SNV QL NAA+NON-PROBE: NOT DETECTED
E FAECALIS DNA SNV QL NAA+NON-PROBE: NOT DETECTED
E FAECIUM DNA SNV QL NAA+NON-PROBE: NOT DETECTED
F MAGNA DNA SNV QL NAA+NON-PROBE: NOT DETECTED
GLUCOSE BLD STRIP.AUTO-MCNC: 117 MG/DL (ref 65–99)
GLUCOSE BLD STRIP.AUTO-MCNC: 134 MG/DL (ref 65–99)
GLUCOSE BLD STRIP.AUTO-MCNC: 136 MG/DL (ref 65–99)
GLUCOSE BLD STRIP.AUTO-MCNC: 153 MG/DL (ref 65–99)
GP B STREP DNA SNV QL NAA+NON-PROBE: NOT DETECTED
GRAM STN SPEC: NORMAL
HAEM INFLU DNA SNV QL NAA+NON-PROBE: NOT DETECTED
K KINGAE DNA SNV QL NAA+NON-PROBE: NOT DETECTED
K. AEROGENES DNA SNV QL NAA+NON-PROBE: NOT DETECTED
K. PNEUMON GROUP DNA SNV QL NAA+NON-PRB: NOT DETECTED
LYMPHOCYTES NFR FLD: 1 %
M. MORGANII DNA SNV QL NAA+NON-PROBE: NOT DETECTED
MECA+MECC+MREJ ISLT/SPM QL: ABNORMAL
MONOS+MACROS NFR FLD MANUAL: 1 %
N GONORRHOEA DNA SNV QL NAA+NON-PROBE: NOT DETECTED
NDM (CARBAPENEMASE), PCR L739821A: NOT DETECTED
NEUTROPHILS NFR FLD: 98 %
NUC CELL # FLD: NORMAL CELLS/UL
P AERUGINOSA DNA SNV QL NAA+NON-PROBE: DETECTED
P ANAEROBIUS DNA SNV QL NAA+NON-PROBE: NOT DETECTED
P MICRA DNA SNV QL NAA+NON-PROBE: NOT DETECTED
PEPTONIPHILUS SP DNA SNV QL NAA+NON-PRB: NOT DETECTED
PROTEUS SP DNA SNV QL NAA+NON-PROBE: NOT DETECTED
RBC # FLD: NORMAL CELLS/UL
S AUREUS DNA SNV QL NAA+NON-PROBE: NOT DETECTED
S LUGDUNENSIS DNA SNV QL NAA+NON-PROBE: NOT DETECTED
S MARCESCENS DNA SNV QL NAA+NON-PROBE: NOT DETECTED
S PNEUM DNA SNV QL NAA+NON-PROBE: NOT DETECTED
S PYO DNA SNV QL NAA+NON-PROBE: NOT DETECTED
SALMONELLA DNA SNV QL NAA+NON-PROBE: NOT DETECTED
SIGNIFICANT IND 70042: NORMAL
SITE SITE: NORMAL
SOURCE SOURCE: NORMAL
STREPTOCOCCUS DNA SNV QL NAA+NON-PROBE: NOT DETECTED
VANA+VANB ISLT/SPM QL: ABNORMAL

## 2023-10-26 PROCEDURE — 700111 HCHG RX REV CODE 636 W/ 250 OVERRIDE (IP): Performed by: PHYSICIAN ASSISTANT

## 2023-10-26 PROCEDURE — 700111 HCHG RX REV CODE 636 W/ 250 OVERRIDE (IP): Mod: JZ | Performed by: INTERNAL MEDICINE

## 2023-10-26 PROCEDURE — 87999 UNLISTED MICROBIOLOGY PX: CPT

## 2023-10-26 PROCEDURE — 87077 CULTURE AEROBIC IDENTIFY: CPT | Mod: 91

## 2023-10-26 PROCEDURE — 700102 HCHG RX REV CODE 250 W/ 637 OVERRIDE(OP): Performed by: INTERNAL MEDICINE

## 2023-10-26 PROCEDURE — 82962 GLUCOSE BLOOD TEST: CPT

## 2023-10-26 PROCEDURE — 700105 HCHG RX REV CODE 258: Performed by: STUDENT IN AN ORGANIZED HEALTH CARE EDUCATION/TRAINING PROGRAM

## 2023-10-26 PROCEDURE — 99232 SBSQ HOSP IP/OBS MODERATE 35: CPT | Performed by: STUDENT IN AN ORGANIZED HEALTH CARE EDUCATION/TRAINING PROGRAM

## 2023-10-26 PROCEDURE — 770006 HCHG ROOM/CARE - MED/SURG/GYN SEMI*

## 2023-10-26 PROCEDURE — 700101 HCHG RX REV CODE 250: Performed by: PHYSICIAN ASSISTANT

## 2023-10-26 PROCEDURE — 87186 SC STD MICRODIL/AGAR DIL: CPT

## 2023-10-26 PROCEDURE — 89051 BODY FLUID CELL COUNT: CPT

## 2023-10-26 PROCEDURE — A9270 NON-COVERED ITEM OR SERVICE: HCPCS | Performed by: INTERNAL MEDICINE

## 2023-10-26 PROCEDURE — 87205 SMEAR GRAM STAIN: CPT

## 2023-10-26 PROCEDURE — 700111 HCHG RX REV CODE 636 W/ 250 OVERRIDE (IP): Mod: JZ | Performed by: STUDENT IN AN ORGANIZED HEALTH CARE EDUCATION/TRAINING PROGRAM

## 2023-10-26 PROCEDURE — 87070 CULTURE OTHR SPECIMN AEROBIC: CPT

## 2023-10-26 RX ORDER — HYDROMORPHONE HYDROCHLORIDE 1 MG/ML
0.5 INJECTION, SOLUTION INTRAMUSCULAR; INTRAVENOUS; SUBCUTANEOUS ONCE
Status: COMPLETED | OUTPATIENT
Start: 2023-10-26 | End: 2023-10-26

## 2023-10-26 RX ADMIN — DOCUSATE SODIUM 50 MG AND SENNOSIDES 8.6 MG 2 TABLET: 8.6; 5 TABLET, FILM COATED ORAL at 17:22

## 2023-10-26 RX ADMIN — POTASSIUM CHLORIDE 10 MEQ: 20 TABLET, EXTENDED RELEASE ORAL at 06:00

## 2023-10-26 RX ADMIN — HYDROMORPHONE HYDROCHLORIDE 0.5 MG: 1 INJECTION, SOLUTION INTRAMUSCULAR; INTRAVENOUS; SUBCUTANEOUS at 09:47

## 2023-10-26 RX ADMIN — ATORVASTATIN CALCIUM 20 MG: 20 TABLET, FILM COATED ORAL at 20:34

## 2023-10-26 RX ADMIN — LOSARTAN POTASSIUM 50 MG: 50 TABLET, FILM COATED ORAL at 05:47

## 2023-10-26 RX ADMIN — OMEPRAZOLE 20 MG: 20 CAPSULE, DELAYED RELEASE ORAL at 05:47

## 2023-10-26 RX ADMIN — LIDOCAINE HYDROCHLORIDE 20 ML: 10 INJECTION, SOLUTION INFILTRATION; PERINEURAL at 10:15

## 2023-10-26 RX ADMIN — CEFEPIME 2 G: 2 INJECTION, POWDER, FOR SOLUTION INTRAVENOUS at 15:18

## 2023-10-26 RX ADMIN — ENOXAPARIN SODIUM 40 MG: 100 INJECTION SUBCUTANEOUS at 17:22

## 2023-10-26 RX ADMIN — OXYCODONE HYDROCHLORIDE 10 MG: 10 TABLET ORAL at 05:47

## 2023-10-26 RX ADMIN — OXYCODONE HYDROCHLORIDE 10 MG: 10 TABLET ORAL at 12:06

## 2023-10-26 RX ADMIN — INSULIN HUMAN 1 UNITS: 100 INJECTION, SOLUTION PARENTERAL at 17:28

## 2023-10-26 RX ADMIN — TIOTROPIUM BROMIDE INHALATION SPRAY 5 MCG: 3.12 SPRAY, METERED RESPIRATORY (INHALATION) at 10:53

## 2023-10-26 RX ADMIN — METOPROLOL SUCCINATE 25 MG: 25 TABLET, EXTENDED RELEASE ORAL at 20:34

## 2023-10-26 RX ADMIN — OXYCODONE HYDROCHLORIDE 10 MG: 10 TABLET ORAL at 18:23

## 2023-10-26 RX ADMIN — FUROSEMIDE 20 MG: 40 TABLET ORAL at 05:47

## 2023-10-26 RX ADMIN — CEFEPIME 2 G: 2 INJECTION, POWDER, FOR SOLUTION INTRAVENOUS at 22:15

## 2023-10-26 ASSESSMENT — ENCOUNTER SYMPTOMS
DIZZINESS: 0
HEADACHES: 0
PALPITATIONS: 0
INSOMNIA: 0
FEVER: 0
COUGH: 0
SENSORY CHANGE: 0
EYE PAIN: 0
VOMITING: 0
NAUSEA: 0
BACK PAIN: 0
BLURRED VISION: 0
ABDOMINAL PAIN: 0
FALLS: 0
FOCAL WEAKNESS: 0
SHORTNESS OF BREATH: 0
CHILLS: 0

## 2023-10-26 ASSESSMENT — PAIN DESCRIPTION - PAIN TYPE: TYPE: ACUTE PAIN

## 2023-10-26 ASSESSMENT — LIFESTYLE VARIABLES: SUBSTANCE_ABUSE: 0

## 2023-10-26 NOTE — PROGRESS NOTES
Hospital Medicine Daily Progress Note    Date of Service  10/26/2023    Chief Complaint  April Alicia is a 63 y.o. female admitted 10/23/2023 with knee pain.    Hospital Course  April Alicia is a 63 y.o. female with history of hypertension, diabetes, GERD, A-fib  and COPD and bilateral total knee replacement who presented 10/23/2023 with complaints of left leg pain onset 4 days ago, swelling and redness.  She had fever with Tmax 102.  She has been taking Augmentin from 10/5/2023.  Patient started on cefazolin in ER.  Currently CT of the left knee is pending.  Subsequently orthopedic surgery consultation will be required.    Interval Problem Update  No acute events overnight.  S/p repeat knee aspiration by ortho today. Appreciate their assistance with this.  Will follow up aspiration culture results.  Continue ceftriaxone.  Appreciate ID recommendations.  Anticipate likely discharge to home when medically cleared.      I have discussed this patient's plan of care and discharge plan at IDT rounds today with Case Management, Nursing, Nursing leadership, and other members of the IDT team.    Consultants/Specialty  orthopedics    Code Status  Full Code    Disposition  Medically Cleared  I have placed the appropriate orders for post-discharge needs.    Review of Systems  Review of Systems   Constitutional:  Negative for chills and fever.   Eyes:  Negative for blurred vision and pain.   Respiratory:  Negative for cough and shortness of breath.    Cardiovascular:  Negative for chest pain, palpitations and leg swelling.   Gastrointestinal:  Negative for abdominal pain, nausea and vomiting.   Genitourinary:  Negative for dysuria and urgency.   Musculoskeletal:  Positive for joint pain. Negative for back pain and falls.   Skin:  Negative for itching and rash.   Neurological:  Negative for dizziness, sensory change, focal weakness and headaches.   Psychiatric/Behavioral:  Negative for substance abuse.  The patient does not have insomnia.         Physical Exam  Temp:  [36.4 °C (97.6 °F)-36.7 °C (98.1 °F)] 36.4 °C (97.6 °F)  Pulse:  [60-81] 76  Resp:  [16-18] 18  BP: ()/(49-64) 99/64  SpO2:  [91 %-95 %] 95 %    Physical Exam  Vitals reviewed.   Constitutional:       General: She is not in acute distress.     Appearance: She is not diaphoretic.   HENT:      Head: Normocephalic and atraumatic.      Right Ear: External ear normal.      Left Ear: External ear normal.      Nose: Nose normal. No congestion.      Mouth/Throat:      Pharynx: No oropharyngeal exudate or posterior oropharyngeal erythema.   Eyes:      Extraocular Movements: Extraocular movements intact.      Pupils: Pupils are equal, round, and reactive to light.   Cardiovascular:      Rate and Rhythm: Normal rate and regular rhythm.   Pulmonary:      Effort: Pulmonary effort is normal. No respiratory distress.      Breath sounds: Normal breath sounds. No wheezing.   Abdominal:      General: Bowel sounds are normal. There is no distension.      Palpations: Abdomen is soft.      Tenderness: There is no abdominal tenderness.   Musculoskeletal:         General: Swelling and tenderness present. Normal range of motion.      Cervical back: Normal range of motion and neck supple.      Comments: Left knee swelling with warmth and pain to palpation, no open wounds   Skin:     General: Skin is warm and dry.   Neurological:      General: No focal deficit present.      Mental Status: She is alert and oriented to person, place, and time.      Cranial Nerves: No cranial nerve deficit.      Sensory: No sensory deficit.      Motor: No weakness.   Psychiatric:         Mood and Affect: Mood normal.         Behavior: Behavior normal.         Fluids    Intake/Output Summary (Last 24 hours) at 10/26/2023 1145  Last data filed at 10/26/2023 1030  Gross per 24 hour   Intake 840 ml   Output --   Net 840 ml       Laboratory  Recent Labs     10/23/23  1941 10/24/23  2884  10/25/23  0522   WBC 6.5 5.4 4.8   RBC 4.08* 3.44* 4.11*   HEMOGLOBIN 11.9* 10.2* 11.8*   HEMATOCRIT 36.1* 30.9* 36.7*   MCV 88.5 89.8 89.3   MCH 29.2 29.7 28.7   MCHC 33.0 33.0 32.2   RDW 49.4 49.8 49.7   PLATELETCT 82* 74* 89*   MPV 10.4 11.6 11.5     Recent Labs     10/23/23  1941 10/24/23  0503 10/25/23  0522   SODIUM 130* 131* 130*   POTASSIUM 3.3* 3.5* 4.3   CHLORIDE 100 101 101   CO2 20 23 19*   GLUCOSE 155* 112* 108*   BUN 18 19 15   CREATININE 1.01 0.99 0.75   CALCIUM 7.9* 7.3* 7.8*     Recent Labs     10/23/23  1941   APTT 40.2*   INR 1.52*               Imaging  CT-KNEE W/O LEFT   Final Result         1.  Moderate knee joint effusion, quantity of fluid appears significantly increased since prior study, noncontrast technique limits characterization of fluid.   2.  No acute traumatic bony injury identified.   3.  Slight lucency at the tibial arthroplasty component suggests possible component of hardware loosening.   4.  Atherosclerosis           Assessment/Plan  * Knee pain- (present on admission)  Assessment & Plan  Concern for cellulitis or septic joint  Hx left knee replacement with recurrent infections  Ortho consulted  CT shows left knee effusion  Knee aspiration 10/24, follow up cultures  Not a good surgical candidate per surgery  Repeat knee aspiration done today 10/26, cultures pending  Continue antibiotics    Type 2 diabetes mellitus (HCC)- (present on admission)  Assessment & Plan  A1c 5.4  Monitor blood sugar daily.  Insulin sliding scale    Paroxysmal atrial fibrillation, hx of- (present on admission)  Assessment & Plan  Noted, not on anticoagulation    Hypertension- (present on admission)  Assessment & Plan  Continue losartan, metoprolol with holding parameters  IV labetalol as needed         VTE prophylaxis:    enoxaparin ppx

## 2023-10-26 NOTE — CARE PLAN
The patient is Stable - Low risk of patient condition declining or worsening    Shift Goals  Clinical Goals: Fuid samples  Patient Goals: pain control  Family Goals: gisele        Patient is not progressing towards the following goals:      Problem: Infection - Standard  Goal: Patient will remain free from infection  Description: Target End Date:  Prior to discharge or change in level of care    1.  Utilize Standard Precautions at all times to reduce the risk of transmission of microorganisms from both recognized and  unrecognized sources of infection  2.  Infection prevention handouts provided (general/device/diagnosis specific) and documented in Patient Education  3.  Educate patient and family/caregiver on isolation precautions if applicable  Outcome: Not Progressing

## 2023-10-26 NOTE — CARE PLAN
Problem: Pain - Standard  Goal: Alleviation of pain or a reduction in pain to the patient’s comfort goal  Outcome: Progressing     Problem: Knowledge Deficit - Standard  Goal: Patient and family/care givers will demonstrate understanding of plan of care, disease process/condition, diagnostic tests and medications  Outcome: Progressing     Problem: Fall Risk  Goal: Patient will remain free from falls  Outcome: Progressing     Problem: Communication  Goal: The ability to communicate needs accurately and effectively will improve  Outcome: Progressing     Problem: Discharge Barriers/Planning  Goal: Patient's continuum of care needs are met  Outcome: Progressing     Problem: Mobility  Goal: Patient's capacity to carry out activities will improve  Outcome: Progressing     Problem: Infection - Standard  Goal: Patient will remain free from infection  Outcome: Progressing   The patient is Stable - Low risk of patient condition declining or worsening    Shift Goals  Clinical Goals: Pain Control, ID consult  Patient Goals: Pain Control, Updated POC  Family Goals: gisele    Progress made toward(s) clinical / shift goals:  pt progressing toward goals    Patient is not progressing towards the following goals:

## 2023-10-26 NOTE — PROCEDURES
Patient seen per request of Dr Humphrey Luz MD for aspiration of Left knee.  Clinical exam of the knee showed marked pain with ROM and palpation.  Xrays were negative for fracture and showed mod joint effusion.  No cutaneous infection noted.  The indications, risks, alternatives, benefits, and alternatives of joint aspiration were presented to the patient.  Knowing full well they wished to proceed and consent was signed.  The knee was first prepped with 3 chlorhexidine prep pads. Then 7cc of 1% xylocain was injected first as a wheel then feathered down and into the joint.  Once proper analgesia was achieved 50cc of  bloody cloudy colored synovial fluid was aspirated using a 18 gauge 3 inch spinal needle.  The patient tolerated the procedure well. Blood loss was less than 1cc.  The aspirate was hand delivered to the lab for joint infection panel, culture/gram, cell count, and crystal analysis.

## 2023-10-27 LAB
ANION GAP SERPL CALC-SCNC: 8 MMOL/L (ref 7–16)
BUN SERPL-MCNC: 13 MG/DL (ref 8–22)
CALCIUM SERPL-MCNC: 8 MG/DL (ref 8.5–10.5)
CHLORIDE SERPL-SCNC: 107 MMOL/L (ref 96–112)
CO2 SERPL-SCNC: 20 MMOL/L (ref 20–33)
CREAT SERPL-MCNC: 0.59 MG/DL (ref 0.5–1.4)
GFR SERPLBLD CREATININE-BSD FMLA CKD-EPI: 101 ML/MIN/1.73 M 2
GLUCOSE BLD STRIP.AUTO-MCNC: 103 MG/DL (ref 65–99)
GLUCOSE BLD STRIP.AUTO-MCNC: 110 MG/DL (ref 65–99)
GLUCOSE BLD STRIP.AUTO-MCNC: 114 MG/DL (ref 65–99)
GLUCOSE BLD STRIP.AUTO-MCNC: 150 MG/DL (ref 65–99)
GLUCOSE SERPL-MCNC: 124 MG/DL (ref 65–99)
POTASSIUM SERPL-SCNC: 4.1 MMOL/L (ref 3.6–5.5)
SODIUM SERPL-SCNC: 135 MMOL/L (ref 135–145)

## 2023-10-27 PROCEDURE — 700102 HCHG RX REV CODE 250 W/ 637 OVERRIDE(OP): Performed by: INTERNAL MEDICINE

## 2023-10-27 PROCEDURE — 700111 HCHG RX REV CODE 636 W/ 250 OVERRIDE (IP): Mod: JZ | Performed by: INTERNAL MEDICINE

## 2023-10-27 PROCEDURE — 80048 BASIC METABOLIC PNL TOTAL CA: CPT

## 2023-10-27 PROCEDURE — 99233 SBSQ HOSP IP/OBS HIGH 50: CPT | Performed by: INTERNAL MEDICINE

## 2023-10-27 PROCEDURE — 770006 HCHG ROOM/CARE - MED/SURG/GYN SEMI*

## 2023-10-27 PROCEDURE — A9270 NON-COVERED ITEM OR SERVICE: HCPCS | Performed by: INTERNAL MEDICINE

## 2023-10-27 PROCEDURE — 82962 GLUCOSE BLOOD TEST: CPT | Mod: 91

## 2023-10-27 PROCEDURE — 36415 COLL VENOUS BLD VENIPUNCTURE: CPT

## 2023-10-27 PROCEDURE — 700111 HCHG RX REV CODE 636 W/ 250 OVERRIDE (IP): Mod: JZ | Performed by: STUDENT IN AN ORGANIZED HEALTH CARE EDUCATION/TRAINING PROGRAM

## 2023-10-27 PROCEDURE — 700105 HCHG RX REV CODE 258: Performed by: STUDENT IN AN ORGANIZED HEALTH CARE EDUCATION/TRAINING PROGRAM

## 2023-10-27 PROCEDURE — 99232 SBSQ HOSP IP/OBS MODERATE 35: CPT | Performed by: STUDENT IN AN ORGANIZED HEALTH CARE EDUCATION/TRAINING PROGRAM

## 2023-10-27 RX ADMIN — ENOXAPARIN SODIUM 40 MG: 100 INJECTION SUBCUTANEOUS at 17:19

## 2023-10-27 RX ADMIN — ATORVASTATIN CALCIUM 20 MG: 20 TABLET, FILM COATED ORAL at 21:48

## 2023-10-27 RX ADMIN — METOPROLOL SUCCINATE 25 MG: 25 TABLET, EXTENDED RELEASE ORAL at 21:49

## 2023-10-27 RX ADMIN — OXYCODONE HYDROCHLORIDE 10 MG: 10 TABLET ORAL at 03:44

## 2023-10-27 RX ADMIN — OMEPRAZOLE 20 MG: 20 CAPSULE, DELAYED RELEASE ORAL at 05:48

## 2023-10-27 RX ADMIN — OXYCODONE HYDROCHLORIDE 10 MG: 10 TABLET ORAL at 10:59

## 2023-10-27 RX ADMIN — CEFEPIME 2 G: 2 INJECTION, POWDER, FOR SOLUTION INTRAVENOUS at 15:41

## 2023-10-27 RX ADMIN — OXYCODONE HYDROCHLORIDE 10 MG: 10 TABLET ORAL at 00:36

## 2023-10-27 RX ADMIN — POTASSIUM CHLORIDE 10 MEQ: 20 TABLET, EXTENDED RELEASE ORAL at 05:48

## 2023-10-27 RX ADMIN — OXYCODONE HYDROCHLORIDE 10 MG: 10 TABLET ORAL at 21:47

## 2023-10-27 RX ADMIN — CEFEPIME 2 G: 2 INJECTION, POWDER, FOR SOLUTION INTRAVENOUS at 21:55

## 2023-10-27 RX ADMIN — TIOTROPIUM BROMIDE INHALATION SPRAY 5 MCG: 3.12 SPRAY, METERED RESPIRATORY (INHALATION) at 10:02

## 2023-10-27 RX ADMIN — CEFEPIME 2 G: 2 INJECTION, POWDER, FOR SOLUTION INTRAVENOUS at 05:50

## 2023-10-27 RX ADMIN — FUROSEMIDE 20 MG: 40 TABLET ORAL at 05:48

## 2023-10-27 RX ADMIN — OXYCODONE HYDROCHLORIDE 10 MG: 10 TABLET ORAL at 16:27

## 2023-10-27 RX ADMIN — LOSARTAN POTASSIUM 50 MG: 50 TABLET, FILM COATED ORAL at 05:48

## 2023-10-27 ASSESSMENT — ENCOUNTER SYMPTOMS
FOCAL WEAKNESS: 0
INSOMNIA: 0
DIARRHEA: 0
NERVOUS/ANXIOUS: 0
VOMITING: 0
SENSORY CHANGE: 0
BLURRED VISION: 0
CHILLS: 0
FALLS: 0
FEVER: 0
PALPITATIONS: 0
SHORTNESS OF BREATH: 0
EYE PAIN: 0
HEADACHES: 0
COUGH: 0
NAUSEA: 0
CONSTIPATION: 0
BACK PAIN: 0
DIZZINESS: 0
ABDOMINAL PAIN: 0

## 2023-10-27 ASSESSMENT — PAIN DESCRIPTION - PAIN TYPE
TYPE: ACUTE PAIN

## 2023-10-27 ASSESSMENT — LIFESTYLE VARIABLES: SUBSTANCE_ABUSE: 0

## 2023-10-27 NOTE — PROGRESS NOTES
"      Orthopaedic Progress Note    Interval changes:  Patient doing well   Case discussed with Dr Luz cultures positive for pseudomonas-return to OR Monday for L knee I&D  NPO after midnight sunday  LLE dressings are CDI    ROS - Patient denies any new issues.  Pain well controlled.    /64   Pulse 70   Temp 36.7 °C (98.1 °F) (Temporal)   Resp 16   Ht 1.727 m (5' 8\")   Wt 90 kg (198 lb 6.6 oz)   SpO2 96%     Patient seen and examined  No acute distress  Breathing non labored  RRR  LLE dressings CDI, DNVI, moves all toes.     Recent Labs     10/25/23  0522   WBC 4.8   RBC 4.11*   HEMOGLOBIN 11.8*   HEMATOCRIT 36.7*   MCV 89.3   MCH 28.7   MCHC 32.2   RDW 49.7   PLATELETCT 89*   MPV 11.5       Active Hospital Problems    Diagnosis     Hypertension [I10]      Priority: Medium    Knee pain [M25.569]     Type 2 diabetes mellitus (HCC) [E11.9]     Paroxysmal atrial fibrillation, hx of [I48.0]        Assessment/Plan:  Patient doing well   Case discussed with Dr Luz cultures positive for pseudomonas-return to OR Monday for L knee I&D  NPO after midnight sunday    "

## 2023-10-27 NOTE — PROGRESS NOTES
[facility] Medicine Daily Progress Note    Date of Service  10/27/2023    Chief Complaint  April Alicia is a 63 y.o. female admitted 10/23/2023 with knee pain.    Hospital Course  April Alicia is a 63 y.o. female with history of hypertension, diabetes, GERD, A-fib  and COPD and bilateral total knee replacement who presented 10/23/2023 with complaints of left leg pain onset 4 days ago, swelling and redness.  She had fever with Tmax 102.  She has been taking Augmentin from 10/5/2023.  Patient started on cefazolin in ER.  Currently CT of the left knee is pending.  Subsequently orthopedic surgery consultation will be required.    Interval Problem Update  No acute events overnight.  Knee aspiration PCR positive for pseudomonas.  Cefepime started.  Patient reports plan to go to OR Monday for knee washout. Will confirm with surgery.  ID following, appreciate recommendations.      I have discussed this patient's plan of care and discharge plan at IDT rounds today with Case Management, Nursing, Nursing leadership, and other members of the IDT team.    Consultants/Specialty  orthopedics    Code Status  Full Code    Disposition  The patient is not medically cleared for discharge to home or a post-acute facility.      I have placed the appropriate orders for post-discharge needs.    Review of Systems  Review of Systems   Constitutional:  Negative for chills and fever.   Eyes:  Negative for blurred vision and pain.   Respiratory:  Negative for cough and shortness of breath.    Cardiovascular:  Negative for chest pain, palpitations and leg swelling.   Gastrointestinal:  Negative for abdominal pain, nausea and vomiting.   Genitourinary:  Negative for dysuria and urgency.   Musculoskeletal:  Positive for joint pain. Negative for back pain and falls.   Skin:  Negative for itching and rash.   Neurological:  Negative for dizziness, sensory change, focal weakness and headaches.   Psychiatric/Behavioral:  Negative  for substance abuse. The patient does not have insomnia.         Physical Exam  Temp:  [36.4 °C (97.5 °F)-37.3 °C (99.1 °F)] 36.4 °C (97.5 °F)  Pulse:  [60-73] 62  Resp:  [17-18] 18  BP: (128-138)/(68-85) 137/85  SpO2:  [93 %-97 %] 97 %    Physical Exam  Vitals reviewed.   Constitutional:       General: She is not in acute distress.     Appearance: She is not diaphoretic.   HENT:      Head: Normocephalic and atraumatic.      Right Ear: External ear normal.      Left Ear: External ear normal.      Nose: Nose normal. No congestion.      Mouth/Throat:      Pharynx: No oropharyngeal exudate or posterior oropharyngeal erythema.   Eyes:      Extraocular Movements: Extraocular movements intact.      Pupils: Pupils are equal, round, and reactive to light.   Cardiovascular:      Rate and Rhythm: Normal rate and regular rhythm.   Pulmonary:      Effort: Pulmonary effort is normal. No respiratory distress.      Breath sounds: Normal breath sounds. No wheezing.   Abdominal:      General: Bowel sounds are normal. There is no distension.      Palpations: Abdomen is soft.      Tenderness: There is no abdominal tenderness.   Musculoskeletal:         General: Swelling and tenderness present. Normal range of motion.      Cervical back: Normal range of motion and neck supple.      Comments: Left knee swelling with warmth and pain to palpation, no open wounds   Skin:     General: Skin is warm and dry.   Neurological:      General: No focal deficit present.      Mental Status: She is alert and oriented to person, place, and time.      Cranial Nerves: No cranial nerve deficit.      Sensory: No sensory deficit.      Motor: No weakness.   Psychiatric:         Mood and Affect: Mood normal.         Behavior: Behavior normal.         Fluids    Intake/Output Summary (Last 24 hours) at 10/27/2023 1337  Last data filed at 10/27/2023 1100  Gross per 24 hour   Intake 610 ml   Output --   Net 610 ml       Laboratory  Recent Labs     10/25/23  1753    WBC 4.8   RBC 4.11*   HEMOGLOBIN 11.8*   HEMATOCRIT 36.7*   MCV 89.3   MCH 28.7   MCHC 32.2   RDW 49.7   PLATELETCT 89*   MPV 11.5     Recent Labs     10/25/23  0522 10/27/23  0313   SODIUM 130* 135   POTASSIUM 4.3 4.1   CHLORIDE 101 107   CO2 19* 20   GLUCOSE 108* 124*   BUN 15 13   CREATININE 0.75 0.59   CALCIUM 7.8* 8.0*                     Imaging  CT-KNEE W/O LEFT   Final Result         1.  Moderate knee joint effusion, quantity of fluid appears significantly increased since prior study, noncontrast technique limits characterization of fluid.   2.  No acute traumatic bony injury identified.   3.  Slight lucency at the tibial arthroplasty component suggests possible component of hardware loosening.   4.  Atherosclerosis           Assessment/Plan  * Knee pain- (present on admission)  Assessment & Plan  Concern for cellulitis or septic joint  Hx left knee replacement with recurrent infections  Ortho consulted  CT shows left knee effusion  Knee aspiration 10/24, follow up cultures  Repeat knee aspiration done 10/26, PCR positive for pseudomonas  Start cefepime  ID and ortho following, appreciate recommendations    Type 2 diabetes mellitus (HCC)- (present on admission)  Assessment & Plan  A1c 5.4  Monitor blood sugar daily.  Insulin sliding scale    Paroxysmal atrial fibrillation, hx of- (present on admission)  Assessment & Plan  Noted, not on anticoagulation    Hypertension- (present on admission)  Assessment & Plan  Continue losartan, metoprolol with holding parameters  IV labetalol as needed         VTE prophylaxis:    enoxaparin ppx

## 2023-10-27 NOTE — CARE PLAN
The patient is Stable - Low risk of patient condition declining or worsening    Shift Goals  Clinical Goals: Fuid samples  Patient Goals: pain control  Family Goals: gisele    Patient is not progressing towards the following goals:      Problem: Mobility  Goal: Patient's capacity to carry out activities will improve  Description: Target End Date:  Prior to discharge or change in level of care    1.  Assess for barriers to mobility/activity  2.  Implement activity per interdisciplinary team recommendations  3.  Target activity level identified and patient/family/caregiver aware of goal  4.  Provide assistive devices  5.  Instruct patient/caregiver on proper use of assistive/adaptive devices  6.  Schedule activities and rest periods to decrease effects of fatigue  7.  Encourage mobilization to extent of ability  8.  Maintain proper body alignment  9.  Provide adequate pain management to allow progressive mobilization  10. Implement pace maker precautions as needed  Outcome: Not Progressing     Problem: Infection - Standard  Goal: Patient will remain free from infection  Description: Target End Date:  Prior to discharge or change in level of care    1.  Utilize Standard Precautions at all times to reduce the risk of transmission of microorganisms from both recognized and  unrecognized sources of infection  2.  Infection prevention handouts provided (general/device/diagnosis specific) and documented in Patient Education  3.  Educate patient and family/caregiver on isolation precautions if applicable  Outcome: Not Progressing

## 2023-10-27 NOTE — CARE PLAN
The patient is Stable - Low risk of patient condition declining or worsening    Shift Goals  Clinical Goals: free of falls, pain less than 7  Patient Goals: pain control  Family Goals: gisele      Patient is not progressing towards the following goals:      Problem: Pain - Standard  Goal: Alleviation of pain or a reduction in pain to the patient’s comfort goal  Description: Target End Date:  Prior to discharge or change in level of care    Document on Vitals flowsheet    1.  Document pain using the appropriate pain scale per order or unit policy  2.  Educate and implement non-pharmacologic comfort measures (i.e. relaxation, distraction, massage, cold/heat therapy, etc.)  3.  Pain management medications as ordered  4.  Reassess pain after pain med administration per policy  5.  If opiods administered assess patient's response to pain medication is appropriate per POSS sedation scale  6.  Follow pain management plan developed in collaboration with patient and interdisciplinary team (including palliative care or pain specialists if applicable)  Outcome: Not Progressing

## 2023-10-27 NOTE — PROGRESS NOTES
Infectious Disease Progress Note    Author: Aurea Ramos M.D. Date & Time of service: 10/27/2023  3:24 PM    Chief Complaint:  Left knee prosthetic joint infection    Interval History:      Review of Systems:  Review of Systems   HENT:  Positive for hearing loss.    Respiratory:  Negative for cough and shortness of breath.    Gastrointestinal:  Negative for abdominal pain, constipation, diarrhea, nausea and vomiting.   Musculoskeletal:  Positive for joint pain.   Psychiatric/Behavioral:  The patient is not nervous/anxious.        Hemodynamics:  Temp (24hrs), Av.8 °C (98.2 °F), Min:36.4 °C (97.5 °F), Max:37.3 °C (99.1 °F)  Temperature: 36.4 °C (97.5 °F)  Pulse  Av.3  Min: 60  Max: 85   Blood Pressure: 137/85       Physical Exam:  Physical Exam  Cardiovascular:      Rate and Rhythm: Normal rate and regular rhythm.      Heart sounds: Normal heart sounds.   Pulmonary:      Effort: Pulmonary effort is normal.      Breath sounds: Normal breath sounds.   Abdominal:      General: Abdomen is flat. Bowel sounds are normal.      Palpations: Abdomen is soft.   Musculoskeletal:      Comments: Left knee edema and tenderness   Skin:     General: Skin is warm and dry.   Neurological:      General: No focal deficit present.      Mental Status: She is oriented to person, place, and time.   Psychiatric:         Mood and Affect: Mood normal.         Meds:    Current Facility-Administered Medications:     cefepime    diphenhydrAMINE-zinc acetate    insulin regular **AND** POC blood glucose manual result **AND** NOTIFY MD and PharmD **AND** Administer 20 grams of glucose (approximately 8 ounces of fruit juice) every 15 minutes PRN FSBG less than 70 mg/dL **AND** dextrose bolus    omeprazole    tiotropium    potassium chloride SA    senna-docusate **AND** polyethylene glycol/lytes **AND** magnesium hydroxide **AND** bisacodyl    enoxaparin (LOVENOX) injection    acetaminophen    Notify provider if pain remains  uncontrolled **AND** Use the Numeric Rating Scale (NRS), Foley-Baker Faces (WBF), or FLACC on regular floors and Critical-Care Pain Observation Tool (CPOT) on ICUs/Trauma to assess pain **AND** Pulse Ox **AND** Pharmacy Consult Request **AND** If patient difficult to arouse and/or has respiratory depression (respiratory rate of 10 or less), stop any opiates that are currently infusing and call a Rapid Response.    oxyCODONE immediate-release **OR** oxyCODONE immediate-release **OR** morphine injection    labetalol    ondansetron    ondansetron    promethazine    promethazine    prochlorperazine    atorvastatin    furosemide    losartan    metoprolol SR    Labs:  Recent Labs     10/25/23  0522   WBC 4.8   RBC 4.11*   HEMOGLOBIN 11.8*   HEMATOCRIT 36.7*   MCV 89.3   MCH 28.7   RDW 49.7   PLATELETCT 89*   MPV 11.5     Recent Labs     10/25/23  0522 10/27/23  0313   SODIUM 130* 135   POTASSIUM 4.3 4.1   CHLORIDE 101 107   CO2 19* 20   GLUCOSE 108* 124*   BUN 15 13     Recent Labs     10/25/23  0522 10/27/23  0313   CREATININE 0.75 0.59       Imaging:  CT-KNEE W/O LEFT    Result Date: 10/24/2023    10/24/2023 4:23 AM HISTORY/REASON FOR EXAM:  Leg Pain. TECHNIQUE/ EXAM DESCRIPTION AND NUMBER OF VIEWS:  CT scan of the LEFT knee without contrast, with thin section axial acquisition. Up to date radiation dose reduction adjustments have been utilized to meet ALARA standards for radiation dose reduction. COMPARISON:  April 19, 2023 . FINDINGS: Postsurgical changes of total knee arthroplasty are seen. There is no acute fracture, subluxation, or dislocation identified. There appears be slight lucency at the tibial arthroplasty component. There is moderate knee joint effusion noted. Atherosclerotic changes are seen.     1.  Moderate knee joint effusion, quantity of fluid appears significantly increased since prior study, noncontrast technique limits characterization of fluid. 2.  No acute traumatic bony injury identified. 3.   Slight lucency at the tibial arthroplasty component suggests possible component of hardware loosening. 4.  Atherosclerosis      Micro:  Results       Procedure Component Value Units Date/Time    FLUID CULTURE W/GRAM STAIN [[id number]] Collected: 10/26/23 1015    Order Status: Completed Specimen: Synovial Updated: 10/27/23 1500     Significant Indicator NEG     Source SYNO     Site Knee     Culture Result Culture in progress.     Gram Stain Result Many WBCs.  No organisms seen.      JOINT INFECTION PANEL [[id number]]  (Abnormal) Collected: 10/26/23 1015    Order Status: Completed Updated: 10/26/23 1347     Anaerococcus prevotii/vaginalis, PCR Not Detected     Clostridium perfringens, PCR Not Detected     Cutibacterium avidum/granulosum, PCR Not Detected     Enterococcus faecalis, PCR Not Detected     Enterococcus faecium, PCR Not Detected     Finegoldia Magna, PCR Not Detected     Parvimonas Micra, PCR Not Detected     Peptoniphilus, PCR Not Detected     Peptostreptococcus anaerobius, PCR Not Detected     Staphylococcus aureus, PCR Not Detected     Staphylococcus lugdunensis, PCR Not Detected     Streptococcus spp, PCR Not Detected     Streptococcus agalactiae, PCR Not Detected     Streptococcus pneumoniae, PCR Not Detected     Streptococcus pyogenes, PCR Not Detected     Bacteroides fragilis, PCR Not Detected     Citrobacter, PCR Not Detected     Enterobacter cloacae complex, PCR Not Detected     Escherichia coli, PCR Not Detected     Haemophilus influenzae, PCR Not Detected     Kingella kingae, PCR Not Detected     Klebsiella aerogenes, PCR Not Detected     Klebsiella pneumoniae group, PCR Not Detected     Morganella morganii, PCR Not Detected     Neisseria gonorrhoeae, PCR Not Detected     Proteus spp, PCR Not Detected     Pseudomonas aeruginosa, PCR DETECTED     Salmonella spp, PCR Not Detected     Serratia marcescens, PCR Not Detected     Candida spp, PCR Not Detected     Candida albicans, PCR Not Detected     IMP  "(Carbapenemase), PCR Not Detected     KPC (Carbapenemase), PCR Not Detected     NDM (Carbapenemase), PCR Not Detected     Oxa-48-like (Carbapenemase), PCR N/A     VIM (Carbapenemase), PCR Not Detected     CTX-M (ESBL), PCR Not Detected     mecA/C and MREJ (MRSA), PCR N/A     Tawny/B (Vancomycin Resistance), PCR N/A    GRAM STAIN [[id number]] Collected: 10/26/23 1015    Order Status: Completed Specimen: Synovial Updated: 10/26/23 1205     Significant Indicator .     Source SYNO     Site Knee     Gram Stain Result Many WBCs.  No organisms seen.      FLUID CULTURE W/GRAM STAIN [[id number]] Collected: 10/26/23 1015    Order Status: Sent Specimen: Body Fluid from Synovial Fluid     FLUID CULTURE [[id number]] Collected: 10/26/23 1015    Order Status: Sent Specimen: Body Fluid from Synovial Fluid     JOINT INFECTION PANEL [[id number]] Collected: 10/26/23 1015    Order Status: Sent Specimen: Body Fluid from Synovial Fluid     FLUID CULTURE [[id number]] Collected: 10/26/23 1015    Order Status: Sent Specimen: Body Fluid from Synovial Fluid     FLUID CULTURE W/GRAM STAIN [[id number]] Collected: 10/24/23 1500    Order Status: Canceled Specimen: Synovial     BLOOD CULTURE [[id number]] Collected: 10/23/23 1941    Order Status: Completed Specimen: Blood from Peripheral Updated: 10/24/23 0804     Significant Indicator NEG     Source BLD     Site PERIPHERAL     Culture Result No Growth  Note: Blood cultures are incubated for 5 days and  are monitored continuously.Positive blood cultures  are called to the RN and reported as soon as  they are identified.      Narrative:      Per Hospital Policy: Only change Specimen Src: to \"Line\" if  specified by physician order.  Release to patient->Immediate  Right AC    BLOOD CULTURE [[id number]] Collected: 10/23/23 2009    Order Status: Completed Specimen: Blood from Peripheral Updated: 10/24/23 0804     Significant Indicator NEG     Source BLD     Site PERIPHERAL     Culture Result No Growth  Note: " "Blood cultures are incubated for 5 days and  are monitored continuously.Positive blood cultures  are called to the RN and reported as soon as  they are identified.      Narrative:      Per Hospital Policy: Only change Specimen Src: to \"Line\" if  specified by physician order.  Release to patient->Immediate  Right Hand    URINALYSIS (UA) [[id number]]  (Abnormal) Collected: 10/23/23 1941    Order Status: Completed Specimen: Urine Updated: 10/23/23 2040     Color DK Yellow     Character Cloudy     Specific Gravity 1.024     Ph 5.0     Glucose Negative mg/dL      Ketones Trace mg/dL      Protein 100 mg/dL      Bilirubin Small     Urobilinogen, Urine 1.0     Nitrite Negative     Leukocyte Esterase Small     Occult Blood Moderate     Micro Urine Req Microscopic    Narrative:      Release to patient->Immediate            Assessment:  Active Hospital Problems    Diagnosis     *Knee pain [M25.569]     Type 2 diabetes mellitus (HCC) [E11.9]     Paroxysmal atrial fibrillation, hx of [I48.0]     Hypertension [I10]      Interval 24 hours:      AF, O2 RA  Labs reviewed to assess for clinical status, drug toxicity and organ function  Studies reviewed  Micro reviewed    Patient with some ongoing knee pain.  Antibiotics as below.      ASSESSMENT/PLAN:      63 y.o.  admitted 10/23/2023.  She was last seen in ID clinic on 10/5/2023: \"Pt has a past medical history of alcohol and cocaine abuse, cirrhosis, asthma, A-fib on apixaban, 2 diabetes, hypertension. Patient was admitted on July 2022 with a group B strep bacteremia and left knee prosthetic joint infection along with a left shoulder septic arthritis and some subdeltoid abscess.  Plan was for her to discharge to a skilled nursing facility and continue IV ceftriaxone for 6 weeks until 8/30/2022 due to some unknown reason patient only received 4 weeks of antibiotics.  Patient was readmitted on 12/26/2022 due to recurrent left knee pain and swelling.  She was taken back to the OR on " 12/28 underwent explantation and placement of articulating  spacer, wound VAC.  Multiple OR cultures again growing group B Strep.   Underwent synovial fluid removal from the left shoulder on 1/2/2022 WBC +Streptococcus group B (penicillin sensitive). Plan at discharge with 6 weeks of IV antibiotics ceftriaxone 2 g daily till 2/7/2023. Orthopedic MD Garcia note recommending patient continue oral antibiotics for at least 1 year prior to reimplantation as she is undergone 2 I&D's in the past with 2 antibiotic spacers which were both failed.  She is at high risk for failing stage II reimplantation and will be subject to amputation if failed again.  ID recommended ongoing Augmentin with possible end date of 12/1/2023.  She is now admitted complaining of worsening pain and swelling in the left knee after she reports that a drunk person in the park fell on it.  I am unable to elicit a consistent history as to whether or not she was still taking antibiotics or not.     Hospital Course:   Afebrile, no leukocytosis, ongoing thrombocytopenia, chronic.  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, prior cultures positive for Streptococcus, PCR from 10/26 positive for Pseudomonas aeruginosa  -Multiple procedures as above, patient has been on chronic antibiotics  -Orthopedics are following, considering possible amputation recommendation due to her chronic infections and multiple prior procedures making additional procedures low likelihood for success  -Aspiration on 10/26 with 49,800 WBCs, 98% polys-consistent with infection and prosthetic joint  -Synovial fluid panel positive for Pseudomonas aeruginosa  -Synovial fluid cultures from 10/26 are no growth to date  Cirrhosis  Thrombocytopenia, chronic  Leukopenia, chronic     Plan:     Recommendations for antibiotics:   --- Agree with  transition to cefepime given the new findings on PCR & culture     Recommendations for further/ongoing work-up:   --- Follow-up blood cultures, no growth to date  --- Follow-up synovial fluid cultures- + Pseudomonas aeruginosa, awaiting susceptibilities  --- Ortho following and considering options-Per patient the plan is for washout on Monday  --- Monitor labs        Dispo: Awaiting culture results and work-up as above.  Patient is at risk for infectious complications including limb loss.     PICC: TBD        Plan of care discussed with HERB Richards M.D.. Will continue to follow     Aurea Ramos M.D.

## 2023-10-27 NOTE — CARE PLAN
The patient is Stable - Low risk of patient condition declining or worsening    Shift Goals  Clinical Goals: pt's pain level <5 after pain medication t/o shift  Patient Goals: pain control  Family Goals: gisele    Progress made toward(s) clinical / shift goals:  Pt c/o left leg pain.  Prn oxy given.  Pt said that lowest pain level was 5/10 after pain med.  Left knee dressing cdi.  Left knee swollen.  Pt assisted up to BSC.      Patient is not progressing towards the following goals:      Problem: Pain - Standard  Goal: Alleviation of pain or a reduction in pain to the patient’s comfort goal  Outcome: Not Met

## 2023-10-28 LAB
BACTERIA BLD CULT: NORMAL
BACTERIA BLD CULT: NORMAL
BACTERIA FLD AEROBE CULT: ABNORMAL
BACTERIA FLD AEROBE CULT: ABNORMAL
GLUCOSE BLD STRIP.AUTO-MCNC: 144 MG/DL (ref 65–99)
GLUCOSE BLD STRIP.AUTO-MCNC: 160 MG/DL (ref 65–99)
GLUCOSE BLD STRIP.AUTO-MCNC: 177 MG/DL (ref 65–99)
GLUCOSE BLD STRIP.AUTO-MCNC: 99 MG/DL (ref 65–99)
GRAM STN SPEC: ABNORMAL
SIGNIFICANT IND 70042: ABNORMAL
SIGNIFICANT IND 70042: NORMAL
SIGNIFICANT IND 70042: NORMAL
SITE SITE: ABNORMAL
SITE SITE: NORMAL
SITE SITE: NORMAL
SOURCE SOURCE: ABNORMAL
SOURCE SOURCE: NORMAL
SOURCE SOURCE: NORMAL

## 2023-10-28 PROCEDURE — 99232 SBSQ HOSP IP/OBS MODERATE 35: CPT | Performed by: STUDENT IN AN ORGANIZED HEALTH CARE EDUCATION/TRAINING PROGRAM

## 2023-10-28 PROCEDURE — 700102 HCHG RX REV CODE 250 W/ 637 OVERRIDE(OP): Performed by: INTERNAL MEDICINE

## 2023-10-28 PROCEDURE — 700105 HCHG RX REV CODE 258: Performed by: STUDENT IN AN ORGANIZED HEALTH CARE EDUCATION/TRAINING PROGRAM

## 2023-10-28 PROCEDURE — 82962 GLUCOSE BLOOD TEST: CPT | Mod: 91

## 2023-10-28 PROCEDURE — 700111 HCHG RX REV CODE 636 W/ 250 OVERRIDE (IP): Mod: JZ | Performed by: INTERNAL MEDICINE

## 2023-10-28 PROCEDURE — 700111 HCHG RX REV CODE 636 W/ 250 OVERRIDE (IP): Mod: JZ | Performed by: STUDENT IN AN ORGANIZED HEALTH CARE EDUCATION/TRAINING PROGRAM

## 2023-10-28 PROCEDURE — 770006 HCHG ROOM/CARE - MED/SURG/GYN SEMI*

## 2023-10-28 PROCEDURE — A9270 NON-COVERED ITEM OR SERVICE: HCPCS | Performed by: INTERNAL MEDICINE

## 2023-10-28 RX ADMIN — DOCUSATE SODIUM 50 MG AND SENNOSIDES 8.6 MG 2 TABLET: 8.6; 5 TABLET, FILM COATED ORAL at 05:14

## 2023-10-28 RX ADMIN — CEFEPIME 2 G: 2 INJECTION, POWDER, FOR SOLUTION INTRAVENOUS at 15:12

## 2023-10-28 RX ADMIN — FUROSEMIDE 20 MG: 40 TABLET ORAL at 05:14

## 2023-10-28 RX ADMIN — INSULIN HUMAN 1 UNITS: 100 INJECTION, SOLUTION PARENTERAL at 21:11

## 2023-10-28 RX ADMIN — OXYCODONE HYDROCHLORIDE 10 MG: 10 TABLET ORAL at 04:06

## 2023-10-28 RX ADMIN — OXYCODONE HYDROCHLORIDE 10 MG: 10 TABLET ORAL at 15:16

## 2023-10-28 RX ADMIN — INSULIN HUMAN 1 UNITS: 100 INJECTION, SOLUTION PARENTERAL at 12:48

## 2023-10-28 RX ADMIN — OXYCODONE HYDROCHLORIDE 10 MG: 10 TABLET ORAL at 08:56

## 2023-10-28 RX ADMIN — LOSARTAN POTASSIUM 50 MG: 50 TABLET, FILM COATED ORAL at 05:15

## 2023-10-28 RX ADMIN — DOCUSATE SODIUM 50 MG AND SENNOSIDES 8.6 MG 2 TABLET: 8.6; 5 TABLET, FILM COATED ORAL at 16:48

## 2023-10-28 RX ADMIN — OXYCODONE HYDROCHLORIDE 10 MG: 10 TABLET ORAL at 21:10

## 2023-10-28 RX ADMIN — CEFEPIME 2 G: 2 INJECTION, POWDER, FOR SOLUTION INTRAVENOUS at 05:13

## 2023-10-28 RX ADMIN — CEFEPIME 2 G: 2 INJECTION, POWDER, FOR SOLUTION INTRAVENOUS at 20:54

## 2023-10-28 RX ADMIN — METOPROLOL SUCCINATE 25 MG: 25 TABLET, EXTENDED RELEASE ORAL at 20:50

## 2023-10-28 RX ADMIN — OMEPRAZOLE 20 MG: 20 CAPSULE, DELAYED RELEASE ORAL at 05:14

## 2023-10-28 RX ADMIN — TIOTROPIUM BROMIDE INHALATION SPRAY 5 MCG: 3.12 SPRAY, METERED RESPIRATORY (INHALATION) at 09:32

## 2023-10-28 RX ADMIN — POTASSIUM CHLORIDE 10 MEQ: 20 TABLET, EXTENDED RELEASE ORAL at 05:14

## 2023-10-28 RX ADMIN — ENOXAPARIN SODIUM 40 MG: 100 INJECTION SUBCUTANEOUS at 16:48

## 2023-10-28 RX ADMIN — ATORVASTATIN CALCIUM 20 MG: 20 TABLET, FILM COATED ORAL at 20:50

## 2023-10-28 ASSESSMENT — PAIN SCALES - PAIN ASSESSMENT IN ADVANCED DEMENTIA (PAINAD)
TOTALSCORE: 0
BREATHING: NORMAL
FACIALEXPRESSION: SMILING OR INEXPRESSIVE
BODYLANGUAGE: RELAXED
CONSOLABILITY: NO NEED TO CONSOLE

## 2023-10-28 ASSESSMENT — PAIN SCALES - WONG BAKER: WONGBAKER_NUMERICALRESPONSE: HURTS A LITTLE MORE

## 2023-10-28 ASSESSMENT — ENCOUNTER SYMPTOMS
FALLS: 0
FEVER: 0
BLURRED VISION: 0
COUGH: 0
CHILLS: 0
SENSORY CHANGE: 0
PALPITATIONS: 0
NAUSEA: 0
HEADACHES: 0
BACK PAIN: 0
SHORTNESS OF BREATH: 0
FOCAL WEAKNESS: 0
VOMITING: 0
ABDOMINAL PAIN: 0
DIZZINESS: 0
EYE PAIN: 0
INSOMNIA: 0

## 2023-10-28 ASSESSMENT — PAIN DESCRIPTION - PAIN TYPE
TYPE: ACUTE PAIN

## 2023-10-28 ASSESSMENT — PATIENT HEALTH QUESTIONNAIRE - PHQ9
SUM OF ALL RESPONSES TO PHQ9 QUESTIONS 1 AND 2: 0
1. LITTLE INTEREST OR PLEASURE IN DOING THINGS: NOT AT ALL
SUM OF ALL RESPONSES TO PHQ9 QUESTIONS 1 AND 2: 0
2. FEELING DOWN, DEPRESSED, IRRITABLE, OR HOPELESS: NOT AT ALL
1. LITTLE INTEREST OR PLEASURE IN DOING THINGS: NOT AT ALL
2. FEELING DOWN, DEPRESSED, IRRITABLE, OR HOPELESS: NOT AT ALL

## 2023-10-28 ASSESSMENT — LIFESTYLE VARIABLES: SUBSTANCE_ABUSE: 0

## 2023-10-28 NOTE — CARE PLAN
The patient is Stable - Low risk of patient condition declining or worsening    Shift Goals  Clinical Goals: Patient pain will <5/10 through out this shift  Patient Goals: pain control  Family Goals: gisele    Progress made toward(s) clinical / shift goals:  Patient complaints of 8/10,patient report that it helps after pain reliever but pain decrease to only 5-6/10 after pain administration. Patient have dressing on left knee. Patient able to rest and sleep.     Patient is not progressing towards the following goals:      Problem: Pain - Standard  Goal: Alleviation of pain or a reduction in pain to the patient’s comfort goal  Outcome: Not Met

## 2023-10-28 NOTE — PROGRESS NOTES
[facility] Medicine Daily Progress Note    Date of Service  10/28/2023    Chief Complaint  April Alicia is a 63 y.o. female admitted 10/23/2023 with knee pain.    Hospital Course  April Alicia is a 63 y.o. female with history of hypertension, diabetes, GERD, A-fib  and COPD and bilateral total knee replacement who presented 10/23/2023 with complaints of left leg pain onset 4 days ago, swelling and redness.  She had fever with Tmax 102.  She has been taking Augmentin from 10/5/2023.  Patient started on cefazolin in ER.  Currently CT of the left knee is pending.  Subsequently orthopedic surgery consultation will be required.    Interval Problem Update  No acute events overnight.  Continue cefepime for pseudomonas positive knee aspiration fluid.  Plan for I+D of knee Monday. NPO [place] night.  ID following, appreciate recommendations.      I have discussed this patient's plan of care and discharge plan at IDT rounds today with Case Management, Nursing, Nursing leadership, and other members of the IDT team.    Consultants/Specialty  orthopedics    Code Status  Full Code    Disposition  The patient is not medically cleared for discharge to home or a post-acute facility.      I have placed the appropriate orders for post-discharge needs.    Review of Systems  Review of Systems   Constitutional:  Negative for chills and fever.   Eyes:  Negative for blurred vision and pain.   Respiratory:  Negative for cough and shortness of breath.    Cardiovascular:  Negative for chest pain, palpitations and leg swelling.   Gastrointestinal:  Negative for abdominal pain, nausea and vomiting.   Genitourinary:  Negative for dysuria and urgency.   Musculoskeletal:  Positive for joint pain. Negative for back pain and falls.   Skin:  Negative for itching and rash.   Neurological:  Negative for dizziness, sensory change, focal weakness and headaches.   Psychiatric/Behavioral:  Negative for substance abuse. The patient does  not have insomnia.         Physical Exam  Temp:  [36.2 °C (97.1 °F)-36.7 °C (98.1 °F)] 36.2 °C (97.1 °F)  Pulse:  [65-85] 85  Resp:  [16-18] 17  BP: (118-158)/(63-95) 158/95  SpO2:  [95 %-96 %] 96 %    Physical Exam  Vitals reviewed.   Constitutional:       General: She is not in acute distress.     Appearance: She is not diaphoretic.   HENT:      Head: Normocephalic and atraumatic.      Right Ear: External ear normal.      Left Ear: External ear normal.      Nose: Nose normal. No congestion.      Mouth/Throat:      Pharynx: No oropharyngeal exudate or posterior oropharyngeal erythema.   Eyes:      Extraocular Movements: Extraocular movements intact.      Pupils: Pupils are equal, round, and reactive to light.   Cardiovascular:      Rate and Rhythm: Normal rate and regular rhythm.   Pulmonary:      Effort: Pulmonary effort is normal. No respiratory distress.      Breath sounds: Normal breath sounds. No wheezing.   Abdominal:      General: Bowel sounds are normal. There is no distension.      Palpations: Abdomen is soft.      Tenderness: There is no abdominal tenderness.   Musculoskeletal:         General: Swelling and tenderness present. Normal range of motion.      Cervical back: Normal range of motion and neck supple.      Comments: Left knee swelling with warmth and pain to palpation, no open wounds   Skin:     General: Skin is warm and dry.   Neurological:      General: No focal deficit present.      Mental Status: She is alert and oriented to person, place, and time.      Cranial Nerves: No cranial nerve deficit.      Sensory: No sensory deficit.      Motor: No weakness.   Psychiatric:         Mood and Affect: Mood normal.         Behavior: Behavior normal.         Fluids    Intake/Output Summary (Last 24 hours) at 10/28/2023 1249  Last data filed at 10/28/2023 1100  Gross per 24 hour   Intake 930 ml   Output --   Net 930 ml       Laboratory        Recent Labs     10/27/23  0313   SODIUM 135   POTASSIUM 4.1    CHLORIDE 107   CO2 20   GLUCOSE 124*   BUN 13   CREATININE 0.59   CALCIUM 8.0*                     Imaging  CT-KNEE W/O LEFT   Final Result         1.  Moderate knee joint effusion, quantity of fluid appears significantly increased since prior study, noncontrast technique limits characterization of fluid.   2.  No acute traumatic bony injury identified.   3.  Slight lucency at the tibial arthroplasty component suggests possible component of hardware loosening.   4.  Atherosclerosis           Assessment/Plan  * Knee pain- (present on admission)  Assessment & Plan  Concern for cellulitis or septic joint  Hx left knee replacement with recurrent infections  Ortho consulted  CT shows left knee effusion  Knee aspiration 10/24, follow up cultures  Repeat knee aspiration done 10/26, PCR positive for pseudomonas  Start cefepime  ID and ortho following, appreciate recommendations    Type 2 diabetes mellitus (HCC)- (present on admission)  Assessment & Plan  A1c 5.4  Monitor blood sugar daily.  Insulin sliding scale    Paroxysmal atrial fibrillation, hx of- (present on admission)  Assessment & Plan  Noted, not on anticoagulation    Hypertension- (present on admission)  Assessment & Plan  Continue losartan, metoprolol with holding parameters  IV labetalol as needed         VTE prophylaxis:    enoxaparin ppx

## 2023-10-28 NOTE — PROGRESS NOTES
Received in bed sleeping, aaox4, left knee swelling with pain with movement, dressing CDI, POC discussed, needs attended.

## 2023-10-28 NOTE — CARE PLAN
Problem: Pain - Standard  Goal: Alleviation of pain or a reduction in pain to the patient’s comfort goal  Outcome: Progressing  Note: Prn meds per MAR     Problem: Knowledge Deficit - Standard  Goal: Patient and family/care givers will demonstrate understanding of plan of care, disease process/condition, diagnostic tests and medications  Outcome: Progressing  Note: Surgery on 10/30/23, IV antibiotics     Problem: Infection - Standard  Goal: Patient will remain free from infection  Note: Monitor labs and vital signs   The patient is Watcher - Medium risk of patient condition declining or worsening    Shift Goals  Clinical Goals: Patient pain will <5/10 through out this shift  Patient Goals: pain control  Family Goals: gisele    Progress made toward(s) clinical / shift goals:  pain control    Patient is not progressing towards the following goals:

## 2023-10-29 LAB
GLUCOSE BLD STRIP.AUTO-MCNC: 106 MG/DL (ref 65–99)
GLUCOSE BLD STRIP.AUTO-MCNC: 134 MG/DL (ref 65–99)
GLUCOSE BLD STRIP.AUTO-MCNC: 173 MG/DL (ref 65–99)

## 2023-10-29 PROCEDURE — 700111 HCHG RX REV CODE 636 W/ 250 OVERRIDE (IP): Mod: JZ | Performed by: STUDENT IN AN ORGANIZED HEALTH CARE EDUCATION/TRAINING PROGRAM

## 2023-10-29 PROCEDURE — A9270 NON-COVERED ITEM OR SERVICE: HCPCS | Performed by: INTERNAL MEDICINE

## 2023-10-29 PROCEDURE — 700102 HCHG RX REV CODE 250 W/ 637 OVERRIDE(OP): Performed by: STUDENT IN AN ORGANIZED HEALTH CARE EDUCATION/TRAINING PROGRAM

## 2023-10-29 PROCEDURE — 99232 SBSQ HOSP IP/OBS MODERATE 35: CPT | Performed by: STUDENT IN AN ORGANIZED HEALTH CARE EDUCATION/TRAINING PROGRAM

## 2023-10-29 PROCEDURE — 770006 HCHG ROOM/CARE - MED/SURG/GYN SEMI*

## 2023-10-29 PROCEDURE — 700105 HCHG RX REV CODE 258: Performed by: STUDENT IN AN ORGANIZED HEALTH CARE EDUCATION/TRAINING PROGRAM

## 2023-10-29 PROCEDURE — 82962 GLUCOSE BLOOD TEST: CPT

## 2023-10-29 PROCEDURE — 700102 HCHG RX REV CODE 250 W/ 637 OVERRIDE(OP): Performed by: INTERNAL MEDICINE

## 2023-10-29 PROCEDURE — A9270 NON-COVERED ITEM OR SERVICE: HCPCS | Performed by: STUDENT IN AN ORGANIZED HEALTH CARE EDUCATION/TRAINING PROGRAM

## 2023-10-29 RX ADMIN — TIOTROPIUM BROMIDE INHALATION SPRAY 5 MCG: 3.12 SPRAY, METERED RESPIRATORY (INHALATION) at 12:25

## 2023-10-29 RX ADMIN — LOSARTAN POTASSIUM 50 MG: 50 TABLET, FILM COATED ORAL at 04:01

## 2023-10-29 RX ADMIN — OXYCODONE HYDROCHLORIDE 10 MG: 10 TABLET ORAL at 18:35

## 2023-10-29 RX ADMIN — METOPROLOL SUCCINATE 25 MG: 25 TABLET, EXTENDED RELEASE ORAL at 21:01

## 2023-10-29 RX ADMIN — INSULIN HUMAN 1 UNITS: 100 INJECTION, SOLUTION PARENTERAL at 21:04

## 2023-10-29 RX ADMIN — CEFEPIME 2 G: 2 INJECTION, POWDER, FOR SOLUTION INTRAVENOUS at 04:03

## 2023-10-29 RX ADMIN — POTASSIUM CHLORIDE 10 MEQ: 20 TABLET, EXTENDED RELEASE ORAL at 04:01

## 2023-10-29 RX ADMIN — OMEPRAZOLE 20 MG: 20 CAPSULE, DELAYED RELEASE ORAL at 04:02

## 2023-10-29 RX ADMIN — DOCUSATE SODIUM 50 MG AND SENNOSIDES 8.6 MG 2 TABLET: 8.6; 5 TABLET, FILM COATED ORAL at 04:00

## 2023-10-29 RX ADMIN — OXYCODONE HYDROCHLORIDE 10 MG: 10 TABLET ORAL at 10:32

## 2023-10-29 RX ADMIN — OXYCODONE HYDROCHLORIDE 10 MG: 10 TABLET ORAL at 21:51

## 2023-10-29 RX ADMIN — FUROSEMIDE 20 MG: 40 TABLET ORAL at 04:01

## 2023-10-29 RX ADMIN — OXYCODONE HYDROCHLORIDE 10 MG: 10 TABLET ORAL at 14:56

## 2023-10-29 RX ADMIN — CEFEPIME 2 G: 2 INJECTION, POWDER, FOR SOLUTION INTRAVENOUS at 21:14

## 2023-10-29 RX ADMIN — ATORVASTATIN CALCIUM 20 MG: 20 TABLET, FILM COATED ORAL at 21:01

## 2023-10-29 RX ADMIN — DOCUSATE SODIUM 50 MG AND SENNOSIDES 8.6 MG 2 TABLET: 8.6; 5 TABLET, FILM COATED ORAL at 18:31

## 2023-10-29 RX ADMIN — CEFEPIME 2 G: 2 INJECTION, POWDER, FOR SOLUTION INTRAVENOUS at 14:39

## 2023-10-29 RX ADMIN — OXYCODONE HYDROCHLORIDE 10 MG: 10 TABLET ORAL at 04:01

## 2023-10-29 ASSESSMENT — LIFESTYLE VARIABLES: SUBSTANCE_ABUSE: 0

## 2023-10-29 ASSESSMENT — ENCOUNTER SYMPTOMS
COUGH: 0
FEVER: 0
INSOMNIA: 0
CHILLS: 0
BACK PAIN: 0
FALLS: 0
SENSORY CHANGE: 0
ABDOMINAL PAIN: 0
BLURRED VISION: 0
HEADACHES: 0
NAUSEA: 0
VOMITING: 0
SHORTNESS OF BREATH: 0
DIZZINESS: 0
FOCAL WEAKNESS: 0
EYE PAIN: 0
PALPITATIONS: 0

## 2023-10-29 ASSESSMENT — PAIN SCALES - PAIN ASSESSMENT IN ADVANCED DEMENTIA (PAINAD)
CONSOLABILITY: NO NEED TO CONSOLE
BODYLANGUAGE: RELAXED
TOTALSCORE: 0
FACIALEXPRESSION: SMILING OR INEXPRESSIVE
BREATHING: NORMAL

## 2023-10-29 ASSESSMENT — PAIN SCALES - WONG BAKER: WONGBAKER_NUMERICALRESPONSE: HURTS A LITTLE MORE

## 2023-10-29 ASSESSMENT — PAIN DESCRIPTION - PAIN TYPE
TYPE: ACUTE PAIN
TYPE: ACUTE PAIN

## 2023-10-29 NOTE — CARE PLAN
The patient is Stable - Low risk of patient condition declining or worsening    Shift Goals  Clinical Goals: Pain mgt, IV abx, rest, comfort  Patient Goals: pain mgt, rest  Family Goals: not present    Progress made toward(s) clinical / shift goals:    Problem: Pain - Standard  Goal: Alleviation of pain or a reduction in pain to the patient’s comfort goal  Outcome: Progressing     Problem: Mobility  Goal: Patient's capacity to carry out activities will improve  Outcome: Progressing     Problem: Infection - Standard  Goal: Patient will remain free from infection  Outcome: Progressing     Patient is alert and oriented x 4. Complaint of 9/10 pain on her left arm. Pain med given and drops to 4/10. Room air and tolerating it. V/s stable. IV site patent and dressing changed. IV abx given as ordered. Plan for I and D on her left knee, no specific date of procedure. Fall precaution in placed. Refused bed alarm. Calls appropriately . Frequent roundings, continue monitoring. Call light and personal items within reached.     Patient is not progressing towards the following goals:

## 2023-10-29 NOTE — PROGRESS NOTES
Hospital Medicine Daily Progress Note    Date of Service  10/29/2023    Chief Complaint  April Alicia is a 63 y.o. female admitted 10/23/2023 with knee pain.    Hospital Course  April Alicia is a 63 y.o. female with history of hypertension, diabetes, GERD, A-fib  and COPD and bilateral total knee replacement who presented 10/23/2023 with complaints of left leg pain onset 4 days ago, swelling and redness.  She had fever with Tmax 102.  She has been taking Augmentin from 10/5/2023.  Patient started on cefazolin in ER.  Currently CT of the left knee is pending.  Subsequently orthopedic surgery consultation will be required.    Interval Problem Update  No acute events overnight.  Continue cefepime for pseudomonas positive knee aspiration fluid.  Plan for I+D of knee tomorrow. NPO at midnight.  ID and surgery following, appreciate recommendations.      I have discussed this patient's plan of care and discharge plan at IDT rounds today with Case Management, Nursing, Nursing leadership, and other members of the IDT team.    Consultants/Specialty  orthopedics    Code Status  Full Code    Disposition  The patient is not medically cleared for discharge to home or a post-acute facility.      I have placed the appropriate orders for post-discharge needs.    Review of Systems  Review of Systems   Constitutional:  Negative for chills and fever.   Eyes:  Negative for blurred vision and pain.   Respiratory:  Negative for cough and shortness of breath.    Cardiovascular:  Negative for chest pain, palpitations and leg swelling.   Gastrointestinal:  Negative for abdominal pain, nausea and vomiting.   Genitourinary:  Negative for dysuria and urgency.   Musculoskeletal:  Positive for joint pain. Negative for back pain and falls.   Skin:  Negative for itching and rash.   Neurological:  Negative for dizziness, sensory change, focal weakness and headaches.   Psychiatric/Behavioral:  Negative for substance abuse. The  patient does not have insomnia.         Physical Exam  Temp:  [36.4 °C (97.5 °F)-36.9 °C (98.4 °F)] 36.4 °C (97.5 °F)  Pulse:  [68-80] 77  Resp:  [17-19] 17  BP: (133-156)/(61-87) 154/80  SpO2:  [91 %-98 %] 91 %    Physical Exam  Vitals reviewed.   Constitutional:       General: She is not in acute distress.     Appearance: She is not diaphoretic.   HENT:      Head: Normocephalic and atraumatic.      Right Ear: External ear normal.      Left Ear: External ear normal.      Nose: Nose normal. No congestion.      Mouth/Throat:      Pharynx: No oropharyngeal exudate or posterior oropharyngeal erythema.   Eyes:      Extraocular Movements: Extraocular movements intact.      Pupils: Pupils are equal, round, and reactive to light.   Cardiovascular:      Rate and Rhythm: Normal rate and regular rhythm.   Pulmonary:      Effort: Pulmonary effort is normal. No respiratory distress.      Breath sounds: Normal breath sounds. No wheezing.   Abdominal:      General: Bowel sounds are normal. There is no distension.      Palpations: Abdomen is soft.      Tenderness: There is no abdominal tenderness.   Musculoskeletal:         General: Swelling and tenderness present. Normal range of motion.      Cervical back: Normal range of motion and neck supple.      Comments: Left knee swelling with warmth and pain to palpation, no open wounds   Skin:     General: Skin is warm and dry.   Neurological:      General: No focal deficit present.      Mental Status: She is alert and oriented to person, place, and time.      Cranial Nerves: No cranial nerve deficit.      Sensory: No sensory deficit.      Motor: No weakness.   Psychiatric:         Mood and Affect: Mood normal.         Behavior: Behavior normal.         Fluids    Intake/Output Summary (Last 24 hours) at 10/29/2023 1355  Last data filed at 10/29/2023 1000  Gross per 24 hour   Intake 120 ml   Output 2300 ml   Net -2180 ml         Laboratory        Recent Labs     10/27/23  0313   SODIUM  135   POTASSIUM 4.1   CHLORIDE 107   CO2 20   GLUCOSE 124*   BUN 13   CREATININE 0.59   CALCIUM 8.0*                       Imaging  CT-KNEE W/O LEFT   Final Result         1.  Moderate knee joint effusion, quantity of fluid appears significantly increased since prior study, noncontrast technique limits characterization of fluid.   2.  No acute traumatic bony injury identified.   3.  Slight lucency at the tibial arthroplasty component suggests possible component of hardware loosening.   4.  Atherosclerosis           Assessment/Plan  * Knee pain- (present on admission)  Assessment & Plan  Concern for cellulitis or septic joint  Hx left knee replacement with recurrent infections  Ortho consulted  CT shows left knee effusion  Knee aspiration 10/24, follow up cultures  Repeat knee aspiration done 10/26, PCR positive for pseudomonas  Start cefepime  ID and ortho following, appreciate recommendations    Type 2 diabetes mellitus (HCC)- (present on admission)  Assessment & Plan  A1c 5.4  Monitor blood sugar daily.  Insulin sliding scale    Paroxysmal atrial fibrillation, hx of- (present on admission)  Assessment & Plan  Noted, not on anticoagulation    Hypertension- (present on admission)  Assessment & Plan  Continue losartan, metoprolol with holding parameters  IV labetalol as needed         VTE prophylaxis:   SCDs/TEDs

## 2023-10-29 NOTE — PROGRESS NOTES
Patient is alert and oriented x 4. Complaint of 9/10 pain on her left arm. Pain med given and drops to 4/10. Room air and tolerating it. V/s stable. IV site patent and dressing changed. IV abx given as ordered. Plan for I and D on her left knee, on Monday,  Fall precaution in placed. Refused bed alarm. Calls appropriately . Frequent roundings, continue monitoring. Call light and personal items within reached.

## 2023-10-30 ENCOUNTER — APPOINTMENT (OUTPATIENT)
Dept: RADIOLOGY | Facility: MEDICAL CENTER | Age: 63
DRG: 467 | End: 2023-10-30
Attending: ORTHOPAEDIC SURGERY
Payer: MEDICAID

## 2023-10-30 ENCOUNTER — ANESTHESIA EVENT (OUTPATIENT)
Dept: SURGERY | Facility: MEDICAL CENTER | Age: 63
DRG: 467 | End: 2023-10-30
Payer: MEDICAID

## 2023-10-30 ENCOUNTER — ANESTHESIA (OUTPATIENT)
Dept: SURGERY | Facility: MEDICAL CENTER | Age: 63
DRG: 467 | End: 2023-10-30
Payer: MEDICAID

## 2023-10-30 PROBLEM — A41.52 SEPTIC SHOCK DUE TO PSEUDOMONAS SPECIES (HCC): Status: ACTIVE | Noted: 2023-10-30

## 2023-10-30 PROBLEM — M25.569 KNEE PAIN: Status: RESOLVED | Noted: 2023-10-23 | Resolved: 2023-10-30

## 2023-10-30 PROBLEM — R65.21 SEPTIC SHOCK DUE TO PSEUDOMONAS SPECIES (HCC): Status: ACTIVE | Noted: 2023-10-30

## 2023-10-30 LAB
EKG IMPRESSION: NORMAL
EKG IMPRESSION: NORMAL
GLUCOSE BLD STRIP.AUTO-MCNC: 123 MG/DL (ref 65–99)
GLUCOSE BLD STRIP.AUTO-MCNC: 132 MG/DL (ref 65–99)

## 2023-10-30 PROCEDURE — 160048 HCHG OR STATISTICAL LEVEL 1-5: Performed by: ORTHOPAEDIC SURGERY

## 2023-10-30 PROCEDURE — 93010 ELECTROCARDIOGRAM REPORT: CPT | Performed by: INTERNAL MEDICINE

## 2023-10-30 PROCEDURE — 0SRD0J9 REPLACEMENT OF LEFT KNEE JOINT WITH SYNTHETIC SUBSTITUTE, CEMENTED, OPEN APPROACH: ICD-10-PCS | Performed by: ORTHOPAEDIC SURGERY

## 2023-10-30 PROCEDURE — C1713 ANCHOR/SCREW BN/BN,TIS/BN: HCPCS | Performed by: ORTHOPAEDIC SURGERY

## 2023-10-30 PROCEDURE — 700111 HCHG RX REV CODE 636 W/ 250 OVERRIDE (IP): Mod: JZ | Performed by: STUDENT IN AN ORGANIZED HEALTH CARE EDUCATION/TRAINING PROGRAM

## 2023-10-30 PROCEDURE — 0SPD0JZ REMOVAL OF SYNTHETIC SUBSTITUTE FROM LEFT KNEE JOINT, OPEN APPROACH: ICD-10-PCS | Performed by: ORTHOPAEDIC SURGERY

## 2023-10-30 PROCEDURE — 770022 HCHG ROOM/CARE - ICU (200)

## 2023-10-30 PROCEDURE — A9270 NON-COVERED ITEM OR SERVICE: HCPCS | Performed by: STUDENT IN AN ORGANIZED HEALTH CARE EDUCATION/TRAINING PROGRAM

## 2023-10-30 PROCEDURE — 700102 HCHG RX REV CODE 250 W/ 637 OVERRIDE(OP): Performed by: INTERNAL MEDICINE

## 2023-10-30 PROCEDURE — 700111 HCHG RX REV CODE 636 W/ 250 OVERRIDE (IP): Performed by: ANESTHESIOLOGY

## 2023-10-30 PROCEDURE — 700105 HCHG RX REV CODE 258: Performed by: ANESTHESIOLOGY

## 2023-10-30 PROCEDURE — 99232 SBSQ HOSP IP/OBS MODERATE 35: CPT | Performed by: STUDENT IN AN ORGANIZED HEALTH CARE EDUCATION/TRAINING PROGRAM

## 2023-10-30 PROCEDURE — 87075 CULTR BACTERIA EXCEPT BLOOD: CPT | Mod: 91

## 2023-10-30 PROCEDURE — 80048 BASIC METABOLIC PNL TOTAL CA: CPT

## 2023-10-30 PROCEDURE — 700102 HCHG RX REV CODE 250 W/ 637 OVERRIDE(OP): Performed by: STUDENT IN AN ORGANIZED HEALTH CARE EDUCATION/TRAINING PROGRAM

## 2023-10-30 PROCEDURE — 83735 ASSAY OF MAGNESIUM: CPT

## 2023-10-30 PROCEDURE — 160002 HCHG RECOVERY MINUTES (STAT): Performed by: ORTHOPAEDIC SURGERY

## 2023-10-30 PROCEDURE — 160029 HCHG SURGERY MINUTES - 1ST 30 MINS LEVEL 4: Performed by: ORTHOPAEDIC SURGERY

## 2023-10-30 PROCEDURE — 87205 SMEAR GRAM STAIN: CPT | Mod: 91

## 2023-10-30 PROCEDURE — 93005 ELECTROCARDIOGRAM TRACING: CPT | Performed by: ANESTHESIOLOGY

## 2023-10-30 PROCEDURE — 87077 CULTURE AEROBIC IDENTIFY: CPT | Mod: 91

## 2023-10-30 PROCEDURE — 700111 HCHG RX REV CODE 636 W/ 250 OVERRIDE (IP): Mod: JZ | Performed by: NURSE PRACTITIONER

## 2023-10-30 PROCEDURE — 87015 SPECIMEN INFECT AGNT CONCNTJ: CPT | Mod: 91

## 2023-10-30 PROCEDURE — 160041 HCHG SURGERY MINUTES - EA ADDL 1 MIN LEVEL 4: Performed by: ORTHOPAEDIC SURGERY

## 2023-10-30 PROCEDURE — 700111 HCHG RX REV CODE 636 W/ 250 OVERRIDE (IP): Mod: JZ | Performed by: ANESTHESIOLOGY

## 2023-10-30 PROCEDURE — 700101 HCHG RX REV CODE 250: Performed by: STUDENT IN AN ORGANIZED HEALTH CARE EDUCATION/TRAINING PROGRAM

## 2023-10-30 PROCEDURE — 502000 HCHG MISC OR IMPLANTS RC 0278: Performed by: ORTHOPAEDIC SURGERY

## 2023-10-30 PROCEDURE — 700101 HCHG RX REV CODE 250: Performed by: ANESTHESIOLOGY

## 2023-10-30 PROCEDURE — 93005 ELECTROCARDIOGRAM TRACING: CPT | Performed by: ORTHOPAEDIC SURGERY

## 2023-10-30 PROCEDURE — 160035 HCHG PACU - 1ST 60 MINS PHASE I: Performed by: ORTHOPAEDIC SURGERY

## 2023-10-30 PROCEDURE — C1776 JOINT DEVICE (IMPLANTABLE): HCPCS | Performed by: ORTHOPAEDIC SURGERY

## 2023-10-30 PROCEDURE — 0S9D3ZX DRAINAGE OF LEFT KNEE JOINT, PERCUTANEOUS APPROACH, DIAGNOSTIC: ICD-10-PCS | Performed by: ORTHOPAEDIC SURGERY

## 2023-10-30 PROCEDURE — 160009 HCHG ANES TIME/MIN: Performed by: ORTHOPAEDIC SURGERY

## 2023-10-30 PROCEDURE — 73560 X-RAY EXAM OF KNEE 1 OR 2: CPT | Mod: LT

## 2023-10-30 PROCEDURE — 82533 TOTAL CORTISOL: CPT

## 2023-10-30 PROCEDURE — 82962 GLUCOSE BLOOD TEST: CPT | Mod: 91

## 2023-10-30 PROCEDURE — 85025 COMPLETE CBC W/AUTO DIFF WBC: CPT

## 2023-10-30 PROCEDURE — 87070 CULTURE OTHR SPECIMN AEROBIC: CPT | Mod: 91

## 2023-10-30 PROCEDURE — 99291 CRITICAL CARE FIRST HOUR: CPT | Performed by: INTERNAL MEDICINE

## 2023-10-30 PROCEDURE — 87186 SC STD MICRODIL/AGAR DIL: CPT | Mod: 91

## 2023-10-30 PROCEDURE — 160036 HCHG PACU - EA ADDL 30 MINS PHASE I: Performed by: ORTHOPAEDIC SURGERY

## 2023-10-30 PROCEDURE — 700105 HCHG RX REV CODE 258: Performed by: STUDENT IN AN ORGANIZED HEALTH CARE EDUCATION/TRAINING PROGRAM

## 2023-10-30 PROCEDURE — A9270 NON-COVERED ITEM OR SERVICE: HCPCS | Performed by: INTERNAL MEDICINE

## 2023-10-30 DEVICE — CEMENT BONE SPECTRUM GV (1EA): Type: IMPLANTABLE DEVICE | Site: KNEE | Status: FUNCTIONAL

## 2023-10-30 DEVICE — IMPLANTABLE DEVICE: Type: IMPLANTABLE DEVICE | Site: KNEE | Status: FUNCTIONAL

## 2023-10-30 RX ORDER — HYDROMORPHONE HYDROCHLORIDE 1 MG/ML
0.1 INJECTION, SOLUTION INTRAMUSCULAR; INTRAVENOUS; SUBCUTANEOUS
Status: DISCONTINUED | OUTPATIENT
Start: 2023-10-30 | End: 2023-10-30 | Stop reason: HOSPADM

## 2023-10-30 RX ORDER — HYDROMORPHONE HYDROCHLORIDE 2 MG/ML
INJECTION, SOLUTION INTRAMUSCULAR; INTRAVENOUS; SUBCUTANEOUS PRN
Status: DISCONTINUED | OUTPATIENT
Start: 2023-10-30 | End: 2023-10-30 | Stop reason: SURG

## 2023-10-30 RX ORDER — VASOPRESSIN 20 U/ML
INJECTION PARENTERAL PRN
Status: DISCONTINUED | OUTPATIENT
Start: 2023-10-30 | End: 2023-10-30 | Stop reason: HOSPADM

## 2023-10-30 RX ORDER — SODIUM CHLORIDE, SODIUM LACTATE, POTASSIUM CHLORIDE, CALCIUM CHLORIDE 600; 310; 30; 20 MG/100ML; MG/100ML; MG/100ML; MG/100ML
INJECTION, SOLUTION INTRAVENOUS CONTINUOUS
Status: DISCONTINUED | OUTPATIENT
Start: 2023-10-30 | End: 2023-10-30 | Stop reason: HOSPADM

## 2023-10-30 RX ORDER — DIPHENHYDRAMINE HYDROCHLORIDE 50 MG/ML
12.5 INJECTION INTRAMUSCULAR; INTRAVENOUS
Status: DISCONTINUED | OUTPATIENT
Start: 2023-10-30 | End: 2023-10-30 | Stop reason: HOSPADM

## 2023-10-30 RX ORDER — EPHEDRINE SULFATE 50 MG/ML
5 INJECTION, SOLUTION INTRAVENOUS
Status: DISCONTINUED | OUTPATIENT
Start: 2023-10-30 | End: 2023-10-30 | Stop reason: HOSPADM

## 2023-10-30 RX ORDER — NOREPINEPHRINE BITARTRATE 0.03 MG/ML
0-1 INJECTION, SOLUTION INTRAVENOUS CONTINUOUS
Status: DISCONTINUED | OUTPATIENT
Start: 2023-10-30 | End: 2023-11-01

## 2023-10-30 RX ORDER — OXYCODONE HCL 5 MG/5 ML
5 SOLUTION, ORAL ORAL
Status: DISCONTINUED | OUTPATIENT
Start: 2023-10-30 | End: 2023-10-30 | Stop reason: HOSPADM

## 2023-10-30 RX ORDER — CEFAZOLIN SODIUM 1 G/3ML
INJECTION, POWDER, FOR SOLUTION INTRAMUSCULAR; INTRAVENOUS PRN
Status: DISCONTINUED | OUTPATIENT
Start: 2023-10-30 | End: 2023-10-30 | Stop reason: SURG

## 2023-10-30 RX ORDER — HYDROMORPHONE HYDROCHLORIDE 1 MG/ML
0.2 INJECTION, SOLUTION INTRAMUSCULAR; INTRAVENOUS; SUBCUTANEOUS
Status: DISCONTINUED | OUTPATIENT
Start: 2023-10-30 | End: 2023-10-30 | Stop reason: HOSPADM

## 2023-10-30 RX ORDER — ESMOLOL HYDROCHLORIDE 10 MG/ML
INJECTION INTRAVENOUS PRN
Status: DISCONTINUED | OUTPATIENT
Start: 2023-10-30 | End: 2023-10-30 | Stop reason: SURG

## 2023-10-30 RX ORDER — PHENYLEPHRINE HCL IN 0.9% NACL 0.5 MG/5ML
SYRINGE (ML) INTRAVENOUS PRN
Status: DISCONTINUED | OUTPATIENT
Start: 2023-10-30 | End: 2023-10-30 | Stop reason: SURG

## 2023-10-30 RX ORDER — HALOPERIDOL 5 MG/ML
1 INJECTION INTRAMUSCULAR
Status: DISCONTINUED | OUTPATIENT
Start: 2023-10-30 | End: 2023-10-30 | Stop reason: HOSPADM

## 2023-10-30 RX ORDER — MAGNESIUM SULFATE HEPTAHYDRATE 40 MG/ML
INJECTION, SOLUTION INTRAVENOUS PRN
Status: DISCONTINUED | OUTPATIENT
Start: 2023-10-30 | End: 2023-10-30 | Stop reason: SURG

## 2023-10-30 RX ORDER — OXYCODONE HCL 5 MG/5 ML
10 SOLUTION, ORAL ORAL
Status: DISCONTINUED | OUTPATIENT
Start: 2023-10-30 | End: 2023-10-30 | Stop reason: HOSPADM

## 2023-10-30 RX ORDER — METOPROLOL TARTRATE 1 MG/ML
INJECTION, SOLUTION INTRAVENOUS PRN
Status: DISCONTINUED | OUTPATIENT
Start: 2023-10-30 | End: 2023-10-30 | Stop reason: SURG

## 2023-10-30 RX ORDER — SODIUM CHLORIDE, SODIUM LACTATE, POTASSIUM CHLORIDE, CALCIUM CHLORIDE 600; 310; 30; 20 MG/100ML; MG/100ML; MG/100ML; MG/100ML
INJECTION, SOLUTION INTRAVENOUS
Status: DISCONTINUED | OUTPATIENT
Start: 2023-10-30 | End: 2023-10-30 | Stop reason: SURG

## 2023-10-30 RX ORDER — LIDOCAINE HYDROCHLORIDE 20 MG/ML
INJECTION, SOLUTION EPIDURAL; INFILTRATION; INTRACAUDAL; PERINEURAL PRN
Status: DISCONTINUED | OUTPATIENT
Start: 2023-10-30 | End: 2023-10-30 | Stop reason: SURG

## 2023-10-30 RX ORDER — HYDRALAZINE HYDROCHLORIDE 20 MG/ML
5 INJECTION INTRAMUSCULAR; INTRAVENOUS
Status: DISCONTINUED | OUTPATIENT
Start: 2023-10-30 | End: 2023-10-30 | Stop reason: HOSPADM

## 2023-10-30 RX ORDER — NOREPINEPHRINE BITARTRATE 0.03 MG/ML
0-1 INJECTION, SOLUTION INTRAVENOUS CONTINUOUS
Status: DISCONTINUED | OUTPATIENT
Start: 2023-10-30 | End: 2023-10-30 | Stop reason: HOSPADM

## 2023-10-30 RX ORDER — HYDROMORPHONE HYDROCHLORIDE 1 MG/ML
0.4 INJECTION, SOLUTION INTRAMUSCULAR; INTRAVENOUS; SUBCUTANEOUS
Status: DISCONTINUED | OUTPATIENT
Start: 2023-10-30 | End: 2023-10-30 | Stop reason: HOSPADM

## 2023-10-30 RX ORDER — MIDAZOLAM HYDROCHLORIDE 1 MG/ML
INJECTION INTRAMUSCULAR; INTRAVENOUS PRN
Status: DISCONTINUED | OUTPATIENT
Start: 2023-10-30 | End: 2023-10-30 | Stop reason: SURG

## 2023-10-30 RX ORDER — LABETALOL HYDROCHLORIDE 5 MG/ML
5 INJECTION, SOLUTION INTRAVENOUS
Status: DISCONTINUED | OUTPATIENT
Start: 2023-10-30 | End: 2023-10-30 | Stop reason: HOSPADM

## 2023-10-30 RX ORDER — MIDAZOLAM HYDROCHLORIDE 1 MG/ML
INJECTION INTRAMUSCULAR; INTRAVENOUS
Status: DISPENSED
Start: 2023-10-30 | End: 2023-10-31

## 2023-10-30 RX ORDER — ONDANSETRON 2 MG/ML
4 INJECTION INTRAMUSCULAR; INTRAVENOUS
Status: DISCONTINUED | OUTPATIENT
Start: 2023-10-30 | End: 2023-10-30 | Stop reason: HOSPADM

## 2023-10-30 RX ORDER — ROCURONIUM BROMIDE 10 MG/ML
INJECTION, SOLUTION INTRAVENOUS PRN
Status: DISCONTINUED | OUTPATIENT
Start: 2023-10-30 | End: 2023-10-30 | Stop reason: SURG

## 2023-10-30 RX ORDER — MIDAZOLAM HYDROCHLORIDE 1 MG/ML
2 INJECTION INTRAMUSCULAR; INTRAVENOUS ONCE
Status: COMPLETED | OUTPATIENT
Start: 2023-10-30 | End: 2023-10-30

## 2023-10-30 RX ORDER — MEPERIDINE HYDROCHLORIDE 25 MG/ML
6.25 INJECTION INTRAMUSCULAR; INTRAVENOUS; SUBCUTANEOUS
Status: DISCONTINUED | OUTPATIENT
Start: 2023-10-30 | End: 2023-10-30 | Stop reason: HOSPADM

## 2023-10-30 RX ORDER — MAGNESIUM SULFATE HEPTAHYDRATE 40 MG/ML
2 INJECTION, SOLUTION INTRAVENOUS ONCE
Status: COMPLETED | OUTPATIENT
Start: 2023-10-30 | End: 2023-10-30

## 2023-10-30 RX ADMIN — NOREPINEPHRINE BITARTRATE 0.1 MCG/KG/MIN: 1 INJECTION, SOLUTION, CONCENTRATE INTRAVENOUS at 22:10

## 2023-10-30 RX ADMIN — CEFAZOLIN 2 G: 1 INJECTION, POWDER, FOR SOLUTION INTRAMUSCULAR; INTRAVENOUS at 18:02

## 2023-10-30 RX ADMIN — Medication 100 MCG: at 18:40

## 2023-10-30 RX ADMIN — FUROSEMIDE 20 MG: 40 TABLET ORAL at 04:35

## 2023-10-30 RX ADMIN — MAGNESIUM SULFATE HEPTAHYDRATE 2 G: 2 INJECTION, SOLUTION INTRAVENOUS at 21:30

## 2023-10-30 RX ADMIN — ESMOLOL HYDROCHLORIDE 30 MG: 100 INJECTION, SOLUTION INTRAVENOUS at 18:16

## 2023-10-30 RX ADMIN — OXYCODONE HYDROCHLORIDE 10 MG: 10 TABLET ORAL at 09:10

## 2023-10-30 RX ADMIN — POTASSIUM CHLORIDE 10 MEQ: 20 TABLET, EXTENDED RELEASE ORAL at 04:34

## 2023-10-30 RX ADMIN — MIDAZOLAM 1 MG: 1 INJECTION, SOLUTION INTRAMUSCULAR; INTRAVENOUS at 17:52

## 2023-10-30 RX ADMIN — PROPOFOL 150 MG: 10 INJECTION, EMULSION INTRAVENOUS at 17:56

## 2023-10-30 RX ADMIN — FENTANYL CITRATE 100 MCG: 50 INJECTION, SOLUTION INTRAMUSCULAR; INTRAVENOUS at 18:16

## 2023-10-30 RX ADMIN — ROCURONIUM BROMIDE 70 MG: 50 INJECTION, SOLUTION INTRAVENOUS at 17:56

## 2023-10-30 RX ADMIN — CEFEPIME 2 G: 2 INJECTION, POWDER, FOR SOLUTION INTRAVENOUS at 13:30

## 2023-10-30 RX ADMIN — HYDROMORPHONE HYDROCHLORIDE 0.6 MG: 2 INJECTION INTRAMUSCULAR; INTRAVENOUS; SUBCUTANEOUS at 18:29

## 2023-10-30 RX ADMIN — LOSARTAN POTASSIUM 50 MG: 50 TABLET, FILM COATED ORAL at 04:34

## 2023-10-30 RX ADMIN — Medication 100 MCG: at 18:03

## 2023-10-30 RX ADMIN — OXYCODONE HYDROCHLORIDE 10 MG: 10 TABLET ORAL at 13:32

## 2023-10-30 RX ADMIN — OMEPRAZOLE 20 MG: 20 CAPSULE, DELAYED RELEASE ORAL at 04:34

## 2023-10-30 RX ADMIN — FENTANYL CITRATE 50 MCG: 50 INJECTION, SOLUTION INTRAMUSCULAR; INTRAVENOUS at 18:13

## 2023-10-30 RX ADMIN — MAGNESIUM SULFATE HEPTAHYDRATE 4 G: 2 INJECTION, SOLUTION INTRAVENOUS at 18:09

## 2023-10-30 RX ADMIN — METOPROLOL TARTRATE 2.5 MG: 5 INJECTION INTRAVENOUS at 18:36

## 2023-10-30 RX ADMIN — VASOPRESSIN 2 UNITS: 20 INJECTION INTRAVENOUS at 18:59

## 2023-10-30 RX ADMIN — METOPROLOL TARTRATE 2.5 MG: 5 INJECTION INTRAVENOUS at 18:25

## 2023-10-30 RX ADMIN — MIDAZOLAM 1 MG: 1 INJECTION, SOLUTION INTRAMUSCULAR; INTRAVENOUS at 20:23

## 2023-10-30 RX ADMIN — Medication 100 MCG: at 18:46

## 2023-10-30 RX ADMIN — ESMOLOL HYDROCHLORIDE 30 MG: 100 INJECTION, SOLUTION INTRAVENOUS at 17:59

## 2023-10-30 RX ADMIN — VASOPRESSIN 2 UNITS: 20 INJECTION INTRAVENOUS at 19:13

## 2023-10-30 RX ADMIN — LIDOCAINE HYDROCHLORIDE 100 MG: 20 INJECTION, SOLUTION EPIDURAL; INFILTRATION; INTRACAUDAL at 17:56

## 2023-10-30 RX ADMIN — OXYCODONE HYDROCHLORIDE 10 MG: 10 TABLET ORAL at 22:47

## 2023-10-30 RX ADMIN — SUGAMMADEX 200 MG: 100 INJECTION, SOLUTION INTRAVENOUS at 19:30

## 2023-10-30 RX ADMIN — OXYCODONE HYDROCHLORIDE 10 MG: 10 TABLET ORAL at 16:44

## 2023-10-30 RX ADMIN — TIOTROPIUM BROMIDE INHALATION SPRAY 5 MCG: 3.12 SPRAY, METERED RESPIRATORY (INHALATION) at 04:37

## 2023-10-30 RX ADMIN — CEFEPIME 2 G: 2 INJECTION, POWDER, FOR SOLUTION INTRAVENOUS at 04:41

## 2023-10-30 RX ADMIN — OXYCODONE HYDROCHLORIDE 10 MG: 10 TABLET ORAL at 04:13

## 2023-10-30 RX ADMIN — FENTANYL CITRATE 50 MCG: 50 INJECTION, SOLUTION INTRAMUSCULAR; INTRAVENOUS at 17:55

## 2023-10-30 RX ADMIN — Medication 100 MCG: at 18:43

## 2023-10-30 RX ADMIN — SODIUM CHLORIDE, POTASSIUM CHLORIDE, SODIUM LACTATE AND CALCIUM CHLORIDE: 600; 310; 30; 20 INJECTION, SOLUTION INTRAVENOUS at 17:50

## 2023-10-30 RX ADMIN — PROPOFOL 50 MG: 10 INJECTION, EMULSION INTRAVENOUS at 18:17

## 2023-10-30 RX ADMIN — Medication 100 MCG: at 18:11

## 2023-10-30 ASSESSMENT — ENCOUNTER SYMPTOMS
FALLS: 0
COUGH: 0
CHILLS: 0
PALPITATIONS: 0
EYE PAIN: 0
HEADACHES: 0
ABDOMINAL PAIN: 0
FEVER: 0
DIZZINESS: 1
VOMITING: 0
FOCAL WEAKNESS: 0
BLURRED VISION: 0
SENSORY CHANGE: 0
SHORTNESS OF BREATH: 0
DIZZINESS: 0
BACK PAIN: 0
HEARTBURN: 0
NAUSEA: 1
TINGLING: 0
NAUSEA: 0
INSOMNIA: 0
DOUBLE VISION: 0

## 2023-10-30 ASSESSMENT — PAIN DESCRIPTION - PAIN TYPE
TYPE: ACUTE PAIN
TYPE: SURGICAL PAIN
TYPE: ACUTE PAIN
TYPE: SURGICAL PAIN

## 2023-10-30 ASSESSMENT — LIFESTYLE VARIABLES: SUBSTANCE_ABUSE: 0

## 2023-10-30 NOTE — CARE PLAN
Problem: Pain - Standard  Goal: Alleviation of pain or a reduction in pain to the patient’s comfort goal  Outcome: Progressing     Problem: Knowledge Deficit - Standard  Goal: Patient and family/care givers will demonstrate understanding of plan of care, disease process/condition, diagnostic tests and medications  Outcome: Progressing     Problem: Fall Risk  Goal: Patient will remain free from falls  Outcome: Progressing     Problem: Communication  Goal: The ability to communicate needs accurately and effectively will improve  Outcome: Progressing     Problem: Discharge Barriers/Planning  Goal: Patient's continuum of care needs are met  Outcome: Progressing     Problem: Mobility  Goal: Patient's capacity to carry out activities will improve  Outcome: Progressing     Problem: Infection - Standard  Goal: Patient will remain free from infection  Outcome: Progressing   The patient is Stable - Low risk of patient condition declining or worsening    Shift Goals  Clinical Goals: Pain management, IV antbx, comfort  Patient Goals: pain mgt, rest  Family Goals: gisele    Progress made toward(s) clinical / shift goals:  Pt. To be NPO at [facility] for I&D procedure tomorrow.  Pain tolerable with 10mg Oxy.  Blood sugars did not require SSI coverage this shift.    Patient is not progressing towards the following goals:

## 2023-10-30 NOTE — ASSESSMENT & PLAN NOTE
Concern for cellulitis or septic joint on presentation  Hx left knee replacement with recurrent infections  Ortho consulted  CT shows left knee effusion  Status post left total knee arthroplasty with insertion of a spacer on 10/30/2023 Dr. Luz orthopedic surgery  Sugars are positive for Pseudomonas  IV cefepime

## 2023-10-30 NOTE — PROGRESS NOTES
[facility] Medicine Daily Progress Note    Date of Service  10/30/2023    Chief Complaint  April Alicia is a 63 y.o. female admitted 10/23/2023 with knee pain.    Hospital Course  April Alicia is a 63 y.o. female with history of hypertension, diabetes, GERD, A-fib  and COPD and bilateral total knee replacement who presented 10/23/2023 with complaints of left leg pain onset 4 days ago, swelling and redness.  She had fever with Tmax 102.  She has been taking Augmentin from 10/5/2023. Patient found to have left knee joint swelling, s/p aspiration by orthopedic surgery. Aspiration culture growing pseudomonas. ID consulted, patient on cefepime. Ortho plan to take patient for knee washout 10/30.    Interval Problem Update  No acute events overnight.  Continue cefepime for pseudomonas positive knee aspiration fluid.  Plan for knee washout today. Follow up surgery recommendations post op. Unclear if she will need more surgery or not given chronic septic knee joint.  ID and surgery following, appreciate recommendations.  PT/OT evaluation post op.    I have discussed this patient's plan of care and discharge plan at IDT rounds today with Case Management, Nursing, Nursing leadership, and other members of the IDT team.    Consultants/Specialty  orthopedics    Code Status  Full Code    Disposition  The patient is not medically cleared for discharge to home or a post-acute facility.      I have placed the appropriate orders for post-discharge needs.    Review of Systems  Review of Systems   Constitutional:  Negative for chills and fever.   Eyes:  Negative for blurred vision and pain.   Respiratory:  Negative for cough and shortness of breath.    Cardiovascular:  Negative for chest pain, palpitations and leg swelling.   Gastrointestinal:  Negative for abdominal pain, nausea and vomiting.   Genitourinary:  Negative for dysuria and urgency.   Musculoskeletal:  Positive for joint pain. Negative for back pain and  falls.   Skin:  Negative for itching and rash.   Neurological:  Negative for dizziness, sensory change, focal weakness and headaches.   Psychiatric/Behavioral:  Negative for substance abuse. The patient does not have insomnia.         Physical Exam  Temp:  [36.2 °C (97.2 °F)-36.6 °C (97.8 °F)] 36.5 °C (97.7 °F)  Pulse:  [60-68] 68  Resp:  [17-18] 18  BP: (120-138)/(65-80) 138/80  SpO2:  [94 %-97 %] 97 %    Physical Exam  Vitals reviewed.   Constitutional:       General: She is not in acute distress.     Appearance: She is not diaphoretic.   HENT:      Head: Normocephalic and atraumatic.      Right Ear: External ear normal.      Left Ear: External ear normal.      Nose: Nose normal. No congestion.      Mouth/Throat:      Pharynx: No oropharyngeal exudate or posterior oropharyngeal erythema.   Eyes:      Extraocular Movements: Extraocular movements intact.      Pupils: Pupils are equal, round, and reactive to light.   Cardiovascular:      Rate and Rhythm: Normal rate and regular rhythm.   Pulmonary:      Effort: Pulmonary effort is normal. No respiratory distress.      Breath sounds: Normal breath sounds. No wheezing.   Abdominal:      General: Bowel sounds are normal. There is no distension.      Palpations: Abdomen is soft.      Tenderness: There is no abdominal tenderness.   Musculoskeletal:         General: Swelling and tenderness present. Normal range of motion.      Cervical back: Normal range of motion and neck supple.      Comments: Left knee swelling with warmth and pain to palpation, no open wounds   Skin:     General: Skin is warm and dry.   Neurological:      General: No focal deficit present.      Mental Status: She is alert and oriented to person, place, and time.      Cranial Nerves: No cranial nerve deficit.      Sensory: No sensory deficit.      Motor: No weakness.   Psychiatric:         Mood and Affect: Mood normal.         Behavior: Behavior normal.         Fluids    Intake/Output Summary (Last 24  hours) at 10/30/2023 1554  Last data filed at 10/30/2023 0921  Gross per 24 hour   Intake 360 ml   Output 2200 ml   Net -1840 ml       Laboratory                              Imaging  CT-KNEE W/O LEFT   Final Result         1.  Moderate knee joint effusion, quantity of fluid appears significantly increased since prior study, noncontrast technique limits characterization of fluid.   2.  No acute traumatic bony injury identified.   3.  Slight lucency at the tibial arthroplasty component suggests possible component of hardware loosening.   4.  Atherosclerosis           Assessment/Plan  Type 2 diabetes mellitus (HCC)- (present on admission)  Assessment & Plan  A1c 5.4  Monitor blood sugar daily.  Insulin sliding scale    Septic arthritis of knee, left (HCC)- (present on admission)  Assessment & Plan  Concern for cellulitis or septic joint on presentation  Hx left knee replacement with recurrent infections  Ortho consulted  CT shows left knee effusion  Knee aspiration 10/24, cultures not viable  Repeat knee aspiration done 10/26, PCR positive for pseudomonas  Continue cefepime  Plan for knee washout today per ortho, follow up post op rec's  ID and ortho following, appreciate recommendations    Paroxysmal atrial fibrillation, hx of- (present on admission)  Assessment & Plan  Noted, not on anticoagulation    Hypertension- (present on admission)  Assessment & Plan  Continue losartan, metoprolol with holding parameters  IV labetalol as needed         VTE prophylaxis:   SCDs/TEDs

## 2023-10-30 NOTE — CARE PLAN
The patient is Stable - Low risk of patient condition declining or worsening    Shift Goals  Clinical Goals: patient will report a reduced pain from 9 to less than 4 at the end of the shift  Patient Goals: rest  Family Goals: gisele    Progress made toward(s) clinical / shift goals:  Received patient in bed alert and oriented x4. Administered scheduled and PRN medications. NPO at [facility] informed. Hourly rounds performed and fall precautions in place. Call light within reach. Patient resting comfortably.     Patient is not progressing towards the following goals:

## 2023-10-31 ENCOUNTER — APPOINTMENT (OUTPATIENT)
Dept: RADIOLOGY | Facility: MEDICAL CENTER | Age: 63
DRG: 467 | End: 2023-10-31
Attending: INTERNAL MEDICINE
Payer: MEDICAID

## 2023-10-31 LAB
ANION GAP SERPL CALC-SCNC: 10 MMOL/L (ref 7–16)
BASOPHILS # BLD AUTO: 0.2 % (ref 0–1.8)
BASOPHILS # BLD: 0.03 K/UL (ref 0–0.12)
BUN SERPL-MCNC: 16 MG/DL (ref 8–22)
CALCIUM SERPL-MCNC: 7.5 MG/DL (ref 8.5–10.5)
CHLORIDE SERPL-SCNC: 104 MMOL/L (ref 96–112)
CO2 SERPL-SCNC: 21 MMOL/L (ref 20–33)
CORTIS SERPL-MCNC: 11.7 UG/DL (ref 0–23)
CORTIS SERPL-MCNC: 15.7 UG/DL (ref 0–23)
CORTIS SERPL-MCNC: 6.6 UG/DL (ref 0–23)
CREAT SERPL-MCNC: 1.02 MG/DL (ref 0.5–1.4)
EOSINOPHIL # BLD AUTO: 0.03 K/UL (ref 0–0.51)
EOSINOPHIL NFR BLD: 0.2 % (ref 0–6.9)
ERYTHROCYTE [DISTWIDTH] IN BLOOD BY AUTOMATED COUNT: 48.5 FL (ref 35.9–50)
GFR SERPLBLD CREATININE-BSD FMLA CKD-EPI: 62 ML/MIN/1.73 M 2
GLUCOSE BLD STRIP.AUTO-MCNC: 165 MG/DL (ref 65–99)
GLUCOSE BLD STRIP.AUTO-MCNC: 233 MG/DL (ref 65–99)
GLUCOSE BLD STRIP.AUTO-MCNC: 246 MG/DL (ref 65–99)
GLUCOSE BLD STRIP.AUTO-MCNC: 257 MG/DL (ref 65–99)
GLUCOSE SERPL-MCNC: 176 MG/DL (ref 65–99)
GRAM STN SPEC: NORMAL
HCT VFR BLD AUTO: 25.2 % (ref 37–47)
HGB BLD-MCNC: 8.2 G/DL (ref 12–16)
IMM GRANULOCYTES # BLD AUTO: 0.24 K/UL (ref 0–0.11)
IMM GRANULOCYTES NFR BLD AUTO: 1.3 % (ref 0–0.9)
LACTATE SERPL-SCNC: 2.1 MMOL/L (ref 0.5–2)
LACTATE SERPL-SCNC: 2.7 MMOL/L (ref 0.5–2)
LACTATE SERPL-SCNC: 3.1 MMOL/L (ref 0.5–2)
LYMPHOCYTES # BLD AUTO: 1.19 K/UL (ref 1–4.8)
LYMPHOCYTES NFR BLD: 6.5 % (ref 22–41)
MAGNESIUM SERPL-MCNC: 2.9 MG/DL (ref 1.5–2.5)
MCH RBC QN AUTO: 29 PG (ref 27–33)
MCHC RBC AUTO-ENTMCNC: 32.5 G/DL (ref 32.2–35.5)
MCV RBC AUTO: 89 FL (ref 81.4–97.8)
MONOCYTES # BLD AUTO: 0.42 K/UL (ref 0–0.85)
MONOCYTES NFR BLD AUTO: 2.3 % (ref 0–13.4)
NEUTROPHILS # BLD AUTO: 16.35 K/UL (ref 1.82–7.42)
NEUTROPHILS NFR BLD: 89.5 % (ref 44–72)
NRBC # BLD AUTO: 0 K/UL
NRBC BLD-RTO: 0 /100 WBC (ref 0–0.2)
PLATELET # BLD AUTO: 167 K/UL (ref 164–446)
PMV BLD AUTO: 10.3 FL (ref 9–12.9)
POTASSIUM SERPL-SCNC: 4.2 MMOL/L (ref 3.6–5.5)
RBC # BLD AUTO: 2.83 M/UL (ref 4.2–5.4)
SIGNIFICANT IND 70042: NORMAL
SITE SITE: NORMAL
SODIUM SERPL-SCNC: 135 MMOL/L (ref 135–145)
SOURCE SOURCE: NORMAL
WBC # BLD AUTO: 18.3 K/UL (ref 4.8–10.8)

## 2023-10-31 PROCEDURE — 700105 HCHG RX REV CODE 258: Performed by: INTERNAL MEDICINE

## 2023-10-31 PROCEDURE — 99291 CRITICAL CARE FIRST HOUR: CPT | Performed by: INTERNAL MEDICINE

## 2023-10-31 PROCEDURE — 36573 INSJ PICC RS&I 5 YR+: CPT

## 2023-10-31 PROCEDURE — 700101 HCHG RX REV CODE 250: Performed by: STUDENT IN AN ORGANIZED HEALTH CARE EDUCATION/TRAINING PROGRAM

## 2023-10-31 PROCEDURE — 82962 GLUCOSE BLOOD TEST: CPT | Mod: 91

## 2023-10-31 PROCEDURE — 02HV33Z INSERTION OF INFUSION DEVICE INTO SUPERIOR VENA CAVA, PERCUTANEOUS APPROACH: ICD-10-PCS | Performed by: INTERNAL MEDICINE

## 2023-10-31 PROCEDURE — 83605 ASSAY OF LACTIC ACID: CPT | Mod: 91

## 2023-10-31 PROCEDURE — 700111 HCHG RX REV CODE 636 W/ 250 OVERRIDE (IP): Mod: JZ | Performed by: INTERNAL MEDICINE

## 2023-10-31 PROCEDURE — 770022 HCHG ROOM/CARE - ICU (200)

## 2023-10-31 PROCEDURE — 82533 TOTAL CORTISOL: CPT

## 2023-10-31 PROCEDURE — 700111 HCHG RX REV CODE 636 W/ 250 OVERRIDE (IP): Mod: JZ | Performed by: STUDENT IN AN ORGANIZED HEALTH CARE EDUCATION/TRAINING PROGRAM

## 2023-10-31 PROCEDURE — A9270 NON-COVERED ITEM OR SERVICE: HCPCS | Performed by: INTERNAL MEDICINE

## 2023-10-31 PROCEDURE — 700105 HCHG RX REV CODE 258: Performed by: STUDENT IN AN ORGANIZED HEALTH CARE EDUCATION/TRAINING PROGRAM

## 2023-10-31 PROCEDURE — 99233 SBSQ HOSP IP/OBS HIGH 50: CPT | Performed by: INTERNAL MEDICINE

## 2023-10-31 PROCEDURE — 700102 HCHG RX REV CODE 250 W/ 637 OVERRIDE(OP): Performed by: INTERNAL MEDICINE

## 2023-10-31 RX ORDER — SODIUM CHLORIDE, SODIUM LACTATE, POTASSIUM CHLORIDE, AND CALCIUM CHLORIDE .6; .31; .03; .02 G/100ML; G/100ML; G/100ML; G/100ML
1000 INJECTION, SOLUTION INTRAVENOUS ONCE
Status: COMPLETED | OUTPATIENT
Start: 2023-10-31 | End: 2023-10-31

## 2023-10-31 RX ORDER — COSYNTROPIN 0.25 MG/ML
250 INJECTION, POWDER, FOR SOLUTION INTRAMUSCULAR; INTRAVENOUS ONCE
Status: COMPLETED | OUTPATIENT
Start: 2023-10-31 | End: 2023-10-31

## 2023-10-31 RX ORDER — DEXAMETHASONE SODIUM PHOSPHATE 4 MG/ML
4 INJECTION, SOLUTION INTRA-ARTICULAR; INTRALESIONAL; INTRAMUSCULAR; INTRAVENOUS; SOFT TISSUE EVERY 12 HOURS
Status: DISCONTINUED | OUTPATIENT
Start: 2023-10-31 | End: 2023-11-01

## 2023-10-31 RX ORDER — SODIUM CHLORIDE 9 MG/ML
INJECTION, SOLUTION INTRAVENOUS CONTINUOUS
Status: DISCONTINUED | OUTPATIENT
Start: 2023-10-31 | End: 2023-11-01

## 2023-10-31 RX ADMIN — OXYCODONE HYDROCHLORIDE 10 MG: 10 TABLET ORAL at 04:01

## 2023-10-31 RX ADMIN — OXYCODONE HYDROCHLORIDE 10 MG: 10 TABLET ORAL at 23:58

## 2023-10-31 RX ADMIN — CEFEPIME 2 G: 2 INJECTION, POWDER, FOR SOLUTION INTRAVENOUS at 14:21

## 2023-10-31 RX ADMIN — SODIUM CHLORIDE, POTASSIUM CHLORIDE, SODIUM LACTATE AND CALCIUM CHLORIDE 1000 ML: 600; 310; 30; 20 INJECTION, SOLUTION INTRAVENOUS at 09:45

## 2023-10-31 RX ADMIN — OXYCODONE HYDROCHLORIDE 10 MG: 10 TABLET ORAL at 11:07

## 2023-10-31 RX ADMIN — SODIUM CHLORIDE: 9 INJECTION, SOLUTION INTRAVENOUS at 16:08

## 2023-10-31 RX ADMIN — NOREPINEPHRINE BITARTRATE 0.25 MCG/KG/MIN: 1 INJECTION, SOLUTION, CONCENTRATE INTRAVENOUS at 06:50

## 2023-10-31 RX ADMIN — COSYNTROPIN 250 MCG: 0.25 INJECTION, POWDER, LYOPHILIZED, FOR SOLUTION INTRAMUSCULAR; INTRAVENOUS at 10:46

## 2023-10-31 RX ADMIN — INSULIN HUMAN 1 UNITS: 100 INJECTION, SOLUTION PARENTERAL at 06:54

## 2023-10-31 RX ADMIN — OXYCODONE HYDROCHLORIDE 10 MG: 10 TABLET ORAL at 20:07

## 2023-10-31 RX ADMIN — POTASSIUM CHLORIDE 10 MEQ: 20 TABLET, EXTENDED RELEASE ORAL at 05:08

## 2023-10-31 RX ADMIN — OXYCODONE HYDROCHLORIDE 5 MG: 5 TABLET ORAL at 16:57

## 2023-10-31 RX ADMIN — ENOXAPARIN SODIUM 40 MG: 100 INJECTION SUBCUTANEOUS at 18:09

## 2023-10-31 RX ADMIN — MORPHINE SULFATE 4 MG: 4 INJECTION, SOLUTION INTRAMUSCULAR; INTRAVENOUS at 00:56

## 2023-10-31 RX ADMIN — INSULIN HUMAN 2 UNITS: 100 INJECTION, SOLUTION PARENTERAL at 14:27

## 2023-10-31 RX ADMIN — INSULIN HUMAN 3 UNITS: 100 INJECTION, SOLUTION PARENTERAL at 18:29

## 2023-10-31 RX ADMIN — OMEPRAZOLE 20 MG: 20 CAPSULE, DELAYED RELEASE ORAL at 05:16

## 2023-10-31 RX ADMIN — CEFEPIME 2 G: 2 INJECTION, POWDER, FOR SOLUTION INTRAVENOUS at 05:10

## 2023-10-31 RX ADMIN — DOCUSATE SODIUM 50 MG AND SENNOSIDES 8.6 MG 2 TABLET: 8.6; 5 TABLET, FILM COATED ORAL at 05:55

## 2023-10-31 RX ADMIN — DEXAMETHASONE SODIUM PHOSPHATE 4 MG: 4 INJECTION INTRA-ARTICULAR; INTRALESIONAL; INTRAMUSCULAR; INTRAVENOUS; SOFT TISSUE at 09:46

## 2023-10-31 RX ADMIN — FUROSEMIDE 20 MG: 40 TABLET ORAL at 05:08

## 2023-10-31 RX ADMIN — MORPHINE SULFATE 4 MG: 4 INJECTION, SOLUTION INTRAMUSCULAR; INTRAVENOUS at 06:00

## 2023-10-31 RX ADMIN — ATORVASTATIN CALCIUM 20 MG: 20 TABLET, FILM COATED ORAL at 21:00

## 2023-10-31 RX ADMIN — INSULIN HUMAN 2 UNITS: 100 INJECTION, SOLUTION PARENTERAL at 21:09

## 2023-10-31 RX ADMIN — DOCUSATE SODIUM 50 MG AND SENNOSIDES 8.6 MG 2 TABLET: 8.6; 5 TABLET, FILM COATED ORAL at 18:09

## 2023-10-31 RX ADMIN — DEXAMETHASONE SODIUM PHOSPHATE 4 MG: 4 INJECTION INTRA-ARTICULAR; INTRALESIONAL; INTRAMUSCULAR; INTRAVENOUS; SOFT TISSUE at 18:36

## 2023-10-31 RX ADMIN — CEFEPIME 2 G: 2 INJECTION, POWDER, FOR SOLUTION INTRAVENOUS at 21:02

## 2023-10-31 RX ADMIN — TIOTROPIUM BROMIDE INHALATION SPRAY 5 MCG: 3.12 SPRAY, METERED RESPIRATORY (INHALATION) at 06:35

## 2023-10-31 ASSESSMENT — PAIN DESCRIPTION - PAIN TYPE
TYPE: SURGICAL PAIN

## 2023-10-31 ASSESSMENT — ENCOUNTER SYMPTOMS
CARDIOVASCULAR NEGATIVE: 1
SHORTNESS OF BREATH: 0
NERVOUS/ANXIOUS: 0
VOMITING: 0
NEUROLOGICAL NEGATIVE: 1
RESPIRATORY NEGATIVE: 1
WEAKNESS: 0
PSYCHIATRIC NEGATIVE: 1
MYALGIAS: 1
GASTROINTESTINAL NEGATIVE: 1
COUGH: 0
CONSTITUTIONAL NEGATIVE: 1
NAUSEA: 0
EYES NEGATIVE: 1
ABDOMINAL PAIN: 0
DIARRHEA: 0
CONSTIPATION: 1

## 2023-10-31 NOTE — PROGRESS NOTES
"Pulmonary Progress Note    Date of admission  10/23/2023    Chief Complaint  63 y.o. female admitted 10/23/2023 with knee pain    Hospital Course  64 yo female with past medical history significant for hypertension, diabetes, cirrhosis, history of bilateral knee replacements, and atrial fibrillation who was admitted on 10/23/2023 for left knee swelling and pain and found to have septic arthritis with Pseudomonas growing from knee aspirate on Cefepime.  The patient underwent knee washout and explant of hardware with antibiotic spacer placed this evening.  The patient went into atrial fibrillation with RVR post operatively with SBP in the 60s.  Anesthesiology performed cardioversion x 2 with SR, but the patient remains hypotensive.  She received 3 liters IVF and will now need levophed.  She will be transferred to the ICU for ongoing care.\" DR. Schultz note    Interval Problem Update  Reviewed last 24 hour events:  Chart review from the past 24 hours includes imaging, laboratory studies, vital signs and notes available.  Pertinent data for today    Cardiac:  levophed 0.3 mcgs/kg hr  Pulm:  1 l  Neuro:  intact  Heme:  no bleeding  I/O:  -895  ID: cefepime  GI/endo:  low cortisol; cosyntropin test; start dexamethasone  Labs/Imaging:  reviewed  Lines:  peripheral  Mobility:  2  Symptoms: knee pain      Review of Systems  Review of Systems   Constitutional: Negative.    HENT: Negative.     Eyes: Negative.    Respiratory: Negative.     Cardiovascular: Negative.    Gastrointestinal: Negative.    Genitourinary: Negative.    Musculoskeletal:  Positive for joint pain.   Skin: Negative.    Neurological: Negative.    Endo/Heme/Allergies: Negative.    Psychiatric/Behavioral: Negative.          Vital Signs for last 24 hours   Temp:  [35.8 °C (96.5 °F)-36.8 °C (98.2 °F)] 36.8 °C (98.2 °F)  Pulse:  [] 53  Resp:  [10-57] 20  BP: ()/(26-80) 93/54  SpO2:  [92 %-100 %] 94 %          Physical Exam   Physical " Exam  Constitutional:       Appearance: She is ill-appearing.   HENT:      Head: Normocephalic and atraumatic.      Mouth/Throat:      Mouth: Mucous membranes are moist.   Eyes:      Extraocular Movements: Extraocular movements intact.      Pupils: Pupils are equal, round, and reactive to light.   Cardiovascular:      Rate and Rhythm: Regular rhythm. Bradycardia present.   Pulmonary:      Effort: Pulmonary effort is normal.      Breath sounds: Normal breath sounds.   Abdominal:      General: Abdomen is flat. Bowel sounds are normal.      Palpations: Abdomen is soft.   Musculoskeletal:      Cervical back: Normal range of motion.      Comments: Left knee in a brace and limited movement due to pain   Skin:     General: Skin is warm and dry.   Neurological:      General: No focal deficit present.      Mental Status: She is alert and oriented to person, place, and time.   Psychiatric:         Mood and Affect: Mood normal.         Behavior: Behavior normal.         Thought Content: Thought content normal.         Judgment: Judgment normal.         Medications  Current Facility-Administered Medications   Medication Dose Route Frequency Provider Last Rate Last Admin    cosyntropin (Cortrosyn) injection 250 mcg  250 mcg Intravenous Once Jennifer Nolasco M.D.        dexamethasone (Decadron) injection 4 mg  4 mg Intravenous Q12HRS Jennifer Nolasco M.D.   4 mg at 10/31/23 0946    norepinephrine (Levophed) 8 mg in 250 mL NS infusion (premix)  0-1 mcg/kg/min (Ideal) Intravenous Continuous Christian Smith M.D. 30 mL/hr at 10/31/23 0650 0.25 mcg/kg/min at 10/31/23 0650    tiotropium (Spiriva Respimat) 2.5 mcg/Act inhalation spray 5 mcg  5 mcg Inhalation DAILY Grant Richards M.D.   5 mcg at 10/31/23 0635    cefepime (Maxipime) 2 g in  mL IVPB  2 g Intravenous Q8HRS Grant Richards M.D.   Stopped at 10/31/23 0540    diphenhydrAMINE-zinc acetate (Benadryl Itch) topical cream   Topical TID PRN DANYELL Park         insulin regular (HumuLIN R,NovoLIN R) injection  1-6 Units Subcutaneous 4X/DAY ACHCEDRIC Barth M.D.   1 Units at 10/31/23 0654    And    dextrose 10 % BOLUS 25 g  25 g Intravenous Q15 MIN PRN Shai Barth M.D.        omeprazole (PriLOSEC) capsule 20 mg  20 mg Oral DAILY Shai Barth M.D.   20 mg at 10/31/23 0516    potassium chloride SA (Kdur) tablet 10 mEq  10 mEq Oral DAILY Shai Barth M.D.   10 mEq at 10/31/23 0508    senna-docusate (Pericolace Or Senokot S) 8.6-50 MG per tablet 2 Tablet  2 Tablet Oral BID Shai Barth M.D.   2 Tablet at 10/31/23 0555    And    polyethylene glycol/lytes (Miralax) PACKET 1 Packet  1 Packet Oral QDAY PRN Shai Barth M.D.        And    magnesium hydroxide (Milk Of Magnesia) suspension 30 mL  30 mL Oral QDAY PRN Shai Barth M.D.        And    bisacodyl (Dulcolax) suppository 10 mg  10 mg Rectal QDAY PRN Shai Barth M.D.        [Held by provider] enoxaparin (Lovenox) inj 40 mg  40 mg Subcutaneous DAILY AT 1800 Shai Barth M.D.   40 mg at 10/28/23 1648    acetaminophen (Tylenol) tablet 650 mg  650 mg Oral Q6HRS PRN Shai Barth M.D.        Pharmacy Consult Request ...Pain Management Review 1 Each  1 Each Other PHARMACY TO DOSE Shai Barth M.D.        oxyCODONE immediate-release (Roxicodone) tablet 5 mg  5 mg Oral Q3HRS PRN Shai Barth M.D.        Or    oxyCODONE immediate release (Roxicodone) tablet 10 mg  10 mg Oral Q3HRS PRN Shai Barth M.D.   10 mg at 10/31/23 0401    Or    morphine 4 MG/ML injection 4 mg  4 mg Intravenous Q3HRS PRN Shai Barth M.D.   4 mg at 10/31/23 0600    labetalol (Normodyne/Trandate) injection 10 mg  10 mg Intravenous Q4HRS PRN Shai Barth M.D.        ondansetron (Zofran) syringe/vial injection 4 mg  4 mg Intravenous Q4HRS PRN Shai Barth M.D.        ondansetron (Zofran ODT) dispertab 4 mg  4 mg Oral Q4HRS PRN Shai Barth M.D.        promethazine (Phenergan)  tablet 12.5-25 mg  12.5-25 mg Oral Q4HRS PRN Shai Barth M.D.        promethazine (Phenergan) suppository 12.5-25 mg  12.5-25 mg Rectal Q4HRS PRN Shai Barth M.D.        prochlorperazine (Compazine) injection 5-10 mg  5-10 mg Intravenous Q4HRS PRN Shai Barth M.D.        atorvastatin (Lipitor) tablet 20 mg  20 mg Oral Nightly Shai Barth M.D.   20 mg at 10/29/23 2101    furosemide (Lasix) tablet 20 mg  20 mg Oral DAILY Shai Barth M.D.   20 mg at 10/31/23 0508    losartan (Cozaar) tablet 50 mg  50 mg Oral DAILY Shai Barth M.D.   50 mg at 10/30/23 0434    metoprolol SR (Toprol XL) tablet 25 mg  25 mg Oral Nightly Shai Barth M.D.   25 mg at 10/29/23 2101       Fluids    Intake/Output Summary (Last 24 hours) at 10/31/2023 1037  Last data filed at 10/31/2023 0153  Gross per 24 hour   Intake 204.18 ml   Output 100 ml   Net 104.18 ml       Laboratory          Recent Labs     10/30/23  0300   SODIUM 135   POTASSIUM 4.2   CHLORIDE 104   CO2 21   BUN 16   CREATININE 1.02   MAGNESIUM 2.9*   CALCIUM 7.5*     Recent Labs     10/30/23  0300   GLUCOSE 176*     Recent Labs     10/30/23  0300   WBC 18.3*   NEUTSPOLYS 89.50*   LYMPHOCYTES 6.50*   MONOCYTES 2.30   EOSINOPHILS 0.20   BASOPHILS 0.20     Recent Labs     10/30/23  0300   RBC 2.83*   HEMOGLOBIN 8.2*   HEMATOCRIT 25.2*   PLATELETCT 167        Assessment/Plan  Septic shock due to Pseudomonas species ([facility])  Assessment & Plan  Sepsis 2/2 Pseudomonas infection of L knee. S/p explantation with abx spacer placed on 10/30/23  - Received 3L of IVF in PACU  - Levophed initiated for ongoing hypotension, goal MAP >65  - Cont cefepime as per ID    - am corticsol showed level 6.6  - Start dexamethasone and do a cosyntropin stim test to assess for relative adrenal insufficiency  - as lactic still up to 2.7 repeat another bolus of LR  - Repeat lactic in 4 hrs  - on levophed at 0.3 mcgs/kg/hr to maintain MAP at 65      Type 2 diabetes mellitus  "(HCC)- (present on admission)  Assessment & Plan  A1c 5.4  Diabetic diet    Septic arthritis of knee, left (HCC)- (present on admission)  Assessment & Plan  Hx left knee replacement with recurrent infections  Knee aspiration 10/24, cultures not viable  Repeat knee aspiration done 10/26, PCR positive for pseudomonas  Explantation and abx spacer placement 10/30/23  Continue cefepime  ID and ortho following, appreciate recommendations    Paroxysmal atrial fibrillation, hx of- (present on admission)  Assessment & Plan  \"Patient with h/o above, not currently on anticoagulation. Per chart review, was on apixaban until about 7/22 during which she was hospitalized for bacteremia and underwent explantation of hardware due to concern for infected joint at that time. Post op she was anemic with Hgb <7 and eliquis was held and never restarted for unclear reason. However patient was eventually started on Multaq. This was also discontinued recently due to patient being found to not have significant A fib burden on evaluation with Cardiology. CHADSVASC 3 (age, htn, dm).  - S/p cardioversion for unstable A fib RVR in PACU x2, remains hypotensive however now sinus vikki\" From Dr. Smith's note      Pt is sinus and vikki at this time  On DVT prophylaxis  Need to review long term anticoagulation with patient     Hypertension- (present on admission)  Assessment & Plan  H/o above, home meds are losartan and metoprolol  - Home meds held in setting of septic shock requiring pressor support  - Resume when appropriate         VTE:  Lovenox  Ulcer: Not Indicated  Lines: Amaro Catheter  to be removed    I have performed a physical exam and reviewed and updated ROS and Plan today (10/31/2023). In review of yesterday's note (10/30/2023), there are no changes except as documented above.     Discussed patient condition and risk of morbidity and/or mortality with RN, RT, Pharmacy, Charge nurse / hot rounds, and Patient  The patient remains " critically ill.  Critical care time = 33 minutes in directly providing and coordinating critical care and extensive data review.  No time overlap and excludes procedures.

## 2023-10-31 NOTE — PROGRESS NOTES
Infectious Disease Progress Note    Author: Aurea Ramos M.D. Date & Time of service: 10/31/2023  9:28 AM    Chief Complaint:  Left knee prosthetic joint infection     Interval History:  10/31  AF, O2 RA, ongoing knee pain.  Antibiotics as below.    Review of Systems:  Review of Systems   Respiratory:  Negative for cough and shortness of breath.    Gastrointestinal:  Positive for constipation. Negative for abdominal pain, diarrhea, nausea and vomiting.   Musculoskeletal:  Positive for myalgias.   Neurological:  Negative for weakness.   Psychiatric/Behavioral:  The patient is not nervous/anxious.        Hemodynamics:  Temp (24hrs), Av.3 °C (97.4 °F), Min:35.8 °C (96.5 °F), Max:36.8 °C (98.2 °F)  Temperature: 36.8 °C (98.2 °F)  Pulse  Av.2  Min: 52  Max: 158   Blood Pressure: 93/54       Physical Exam:  Physical Exam  Cardiovascular:      Rate and Rhythm: Normal rate and regular rhythm.      Heart sounds: Normal heart sounds.   Pulmonary:      Effort: Pulmonary effort is normal.      Breath sounds: Normal breath sounds.   Abdominal:      General: Abdomen is flat. Bowel sounds are normal. There is no distension.      Palpations: Abdomen is soft.      Tenderness: There is no abdominal tenderness.   Musculoskeletal:         General: Tenderness and signs of injury present.      Left lower leg: Edema present.   Skin:     General: Skin is warm and dry.   Neurological:      General: No focal deficit present.      Mental Status: She is oriented to person, place, and time.   Psychiatric:      Comments: Anxious         Meds:    Current Facility-Administered Medications:     cosyntropin    dexamethasone    LR    NORepinephrine    tiotropium    cefepime    diphenhydrAMINE-zinc acetate    insulin regular **AND** POC blood glucose manual result **AND** NOTIFY MD and PharmD **AND** Administer 20 grams of glucose (approximately 8 ounces of fruit juice) every 15 minutes PRN FSBG less than 70 mg/dL **AND** dextrose  bolus    omeprazole    potassium chloride SA    senna-docusate **AND** polyethylene glycol/lytes **AND** magnesium hydroxide **AND** bisacodyl    [Held by provider] enoxaparin (LOVENOX) injection    acetaminophen    Notify provider if pain remains uncontrolled **AND** Use the Numeric Rating Scale (NRS), Foley-Baker Faces (WBF), or FLACC on regular floors and Critical-Care Pain Observation Tool (CPOT) on ICUs/Trauma to assess pain **AND** Pulse Ox **AND** Pharmacy Consult Request **AND** If patient difficult to arouse and/or has respiratory depression (respiratory rate of 10 or less), stop any opiates that are currently infusing and call a Rapid Response.    oxyCODONE immediate-release **OR** oxyCODONE immediate-release **OR** morphine injection    labetalol    ondansetron    ondansetron    promethazine    promethazine    prochlorperazine    atorvastatin    furosemide    losartan    metoprolol SR    Labs:  Recent Labs     10/30/23  0300   WBC 18.3*   RBC 2.83*   HEMOGLOBIN 8.2*   HEMATOCRIT 25.2*   MCV 89.0   MCH 29.0   RDW 48.5   PLATELETCT 167   MPV 10.3   NEUTSPOLYS 89.50*   LYMPHOCYTES 6.50*   MONOCYTES 2.30   EOSINOPHILS 0.20   BASOPHILS 0.20     Recent Labs     10/30/23  0300   SODIUM 135   POTASSIUM 4.2   CHLORIDE 104   CO2 21   GLUCOSE 176*   BUN 16     Recent Labs     10/30/23  0300   CREATININE 1.02       Imaging:  DX-KNEE 2- LEFT    Result Date: 10/30/2023  10/30/2023 8:00 PM HISTORY/REASON FOR EXAM: Pain/Deformity Following Trauma TECHNIQUE/EXAM DESCRIPTION:  AP, and lateral views of the LEFT knee. COMPARISON:  December 28, 2022 FINDINGS: There is no acute fracture, subluxation, or dislocation identified. Postsurgical changes of total knee arthroplasty are seen with removal of tibial component. Soft tissue gas and knee joint effusion are seen.     1.  No acute traumatic bony injury. 2.  Soft tissue gas in small knee joint effusion are seen.    CT-KNEE W/O LEFT    Result Date: 10/24/2023    10/24/2023 4:23  "AM HISTORY/REASON FOR EXAM:  Leg Pain. TECHNIQUE/ EXAM DESCRIPTION AND NUMBER OF VIEWS:  CT scan of the LEFT knee without contrast, with thin section axial acquisition. Up to date radiation dose reduction adjustments have been utilized to meet ALARA standards for radiation dose reduction. COMPARISON:  April 19, 2023 . FINDINGS: Postsurgical changes of total knee arthroplasty are seen. There is no acute fracture, subluxation, or dislocation identified. There appears be slight lucency at the tibial arthroplasty component. There is moderate knee joint effusion noted. Atherosclerotic changes are seen.     1.  Moderate knee joint effusion, quantity of fluid appears significantly increased since prior study, noncontrast technique limits characterization of fluid. 2.  No acute traumatic bony injury identified. 3.  Slight lucency at the tibial arthroplasty component suggests possible component of hardware loosening. 4.  Atherosclerosis      Micro:  Results       Procedure Component Value Units Date/Time    BLOOD CULTURE [[id number]] Collected: 10/23/23 1941    Order Status: Completed Specimen: Blood from Peripheral Updated: 10/28/23 2100     Significant Indicator NEG     Source BLD     Site PERIPHERAL     Culture Result No growth after 5 days of incubation.    Narrative:      Per Hospital Policy: Only change Specimen Src: to \"Line\" if  specified by physician order.  Release to patient->Immediate  Right AC    BLOOD CULTURE [[id number]] Collected: 10/23/23 2009    Order Status: Completed Specimen: Blood from Peripheral Updated: 10/28/23 2100     Significant Indicator NEG     Source BLD     Site PERIPHERAL     Culture Result No growth after 5 days of incubation.    Narrative:      Per Hospital Policy: Only change Specimen Src: to \"Line\" if  specified by physician order.  Release to patient->Immediate  Right Hand    FLUID CULTURE W/GRAM STAIN [[id number]]  (Abnormal)  (Susceptibility) Collected: 10/26/23 1015    Order Status: " Completed Specimen: Synovial Updated: 10/28/23 0936     Significant Indicator POS     Source SYNO     Site Knee     Culture Result -     Gram Stain Result Many WBCs.  No organisms seen.       Culture Result Pseudomonas aeruginosa  Light growth      Susceptibility       Pseudomonas aeruginosa (1)       Antibiotic Interpretation Microscan   Method Status    Ciprofloxacin Sensitive <=0.25 mcg/mL SHERRIE Final    Cefepime Sensitive <=2 mcg/mL SHERRIE Final    Gentamicin Resistant <=2 mcg/mL SHERRIE Final    Tobramycin Sensitive <=2 mcg/mL SHERRIE Final    Levofloxacin Sensitive <=0.5 mcg/mL SHERRIE Final    Meropenem Sensitive <=1 mcg/mL SHERRIE Final    Pip/Tazobactam Sensitive <=8 mcg/mL SHERRIE Final                       JOINT INFECTION PANEL [[id number]]  (Abnormal) Collected: 10/26/23 1015    Order Status: Completed Updated: 10/26/23 1347     Anaerococcus prevotii/vaginalis, PCR Not Detected     Clostridium perfringens, PCR Not Detected     Cutibacterium avidum/granulosum, PCR Not Detected     Enterococcus faecalis, PCR Not Detected     Enterococcus faecium, PCR Not Detected     Finegoldia Magna, PCR Not Detected     Parvimonas Micra, PCR Not Detected     Peptoniphilus, PCR Not Detected     Peptostreptococcus anaerobius, PCR Not Detected     Staphylococcus aureus, PCR Not Detected     Staphylococcus lugdunensis, PCR Not Detected     Streptococcus spp, PCR Not Detected     Streptococcus agalactiae, PCR Not Detected     Streptococcus pneumoniae, PCR Not Detected     Streptococcus pyogenes, PCR Not Detected     Bacteroides fragilis, PCR Not Detected     Citrobacter, PCR Not Detected     Enterobacter cloacae complex, PCR Not Detected     Escherichia coli, PCR Not Detected     Haemophilus influenzae, PCR Not Detected     Kingella kingae, PCR Not Detected     Klebsiella aerogenes, PCR Not Detected     Klebsiella pneumoniae group, PCR Not Detected     Morganella morganii, PCR Not Detected     Neisseria gonorrhoeae, PCR Not Detected     Proteus spp,  PCR Not Detected     Pseudomonas aeruginosa, PCR DETECTED     Salmonella spp, PCR Not Detected     Serratia marcescens, PCR Not Detected     Candida spp, PCR Not Detected     Candida albicans, PCR Not Detected     IMP (Carbapenemase), PCR Not Detected     KPC (Carbapenemase), PCR Not Detected     NDM (Carbapenemase), PCR Not Detected     Oxa-48-like (Carbapenemase), PCR N/A     VIM (Carbapenemase), PCR Not Detected     CTX-M (ESBL), PCR Not Detected     mecA/C and MREJ (MRSA), PCR N/A     Tawny/B (Vancomycin Resistance), PCR N/A    GRAM STAIN [[id number]] Collected: 10/26/23 1015    Order Status: Completed Specimen: Synovial Updated: 10/26/23 1205     Significant Indicator .     Source SYNO     Site Knee     Gram Stain Result Many WBCs.  No organisms seen.      FLUID CULTURE W/GRAM STAIN [[id number]] Collected: 10/26/23 1015    Order Status: Sent Specimen: Body Fluid from Synovial Fluid     FLUID CULTURE [[id number]] Collected: 10/26/23 1015    Order Status: Sent Specimen: Body Fluid from Synovial Fluid     JOINT INFECTION PANEL [[id number]] Collected: 10/26/23 1015    Order Status: Sent Specimen: Body Fluid from Synovial Fluid     FLUID CULTURE [[id number]] Collected: 10/26/23 1015    Order Status: Sent Specimen: Body Fluid from Synovial Fluid     FLUID CULTURE W/GRAM STAIN [[id number]] Collected: 10/24/23 1500    Order Status: Canceled Specimen: Synovial             Assessment:  Active Hospital Problems    Diagnosis     Septic shock due to Pseudomonas species ([facility]) [A41.52, R65.21]     Type 2 diabetes mellitus ([facility]) [E11.9]     Septic arthritis of knee, left ([facility]) [M00.9]     Paroxysmal atrial fibrillation, hx of [I48.0]     Hypertension [I10]      Interval 24 hours:      AF, O2 RA  Labs reviewed to assess for clinical status, drug toxicity and organ function  Studies reviewed  Micro reviewed    Patient is status post stage I left knee procedure.  Still with ongoing pain, hypotensive overnight so in the ICU.  Continued  "on antibiotics as below.      ASSESSMENT/PLAN:      63 y.o.  admitted 10/23/2023.  She was last seen in ID clinic on 10/5/2023: \"Pt has a past medical history of alcohol and cocaine abuse, cirrhosis, asthma, A-fib on apixaban, 2 diabetes, hypertension. Patient was admitted on July 2022 with a group B strep bacteremia and left knee prosthetic joint infection along with a left shoulder septic arthritis and some subdeltoid abscess.  Plan was for her to discharge to a skilled nursing facility and continue IV ceftriaxone for 6 weeks until 8/30/2022 due to some unknown reason patient only received 4 weeks of antibiotics.  Patient was readmitted on 12/26/2022 due to recurrent left knee pain and swelling.  She was taken back to the OR on 12/28 underwent explantation and placement of articulating  spacer, wound VAC.  Multiple OR cultures again growing group B Strep.   Underwent synovial fluid removal from the left shoulder on 1/2/2022 WBC +Streptococcus group B (penicillin sensitive). Plan at discharge with 6 weeks of IV antibiotics ceftriaxone 2 g daily till 2/7/2023. Orthopedic MD Garcia note recommending patient continue oral antibiotics for at least 1 year prior to reimplantation as she is undergone 2 I&D's in the past with 2 antibiotic spacers which were both failed.  She is at high risk for failing stage II reimplantation and will be subject to amputation if failed again.  ID recommended ongoing Augmentin with possible end date of 12/1/2023.  She is now admitted complaining of worsening pain and swelling in the left knee after she reports that a drunk person in the park fell on it.  I am unable to elicit a consistent history as to whether or not she was still taking antibiotics or not.     Hospital Course:   Afebrile, no leukocytosis, ongoing thrombocytopenia, chronic.  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, prior cultures positive for Streptococcus, PCR from 10/26 positive for Pseudomonas aeruginosa  -Multiple procedures as above, patient has been on chronic antibiotics  -Orthopedics are following, considering possible amputation recommendation due to her chronic infections and multiple prior procedures making additional procedures low likelihood for success  -Aspiration on 10/26 with 49,800 WBCs, 98% polys-consistent with infection and prosthetic joint  -Synovial fluid panel +Pseudomonas aeruginosa  -Synovial fluid cultures from 10/26   +Pseudomonas aeruginosa   -OR on 10/30 with left total knee arthroplasty explantation and insertion of articulating spacer, fluid sent for culture-Per daughter this was stage I of a planned two-stage procedure  Cirrhosis  Thrombocytopenia, chronic  Leukopenia, chronic     Plan:     Recommendations for antibiotics:   --- Continue cefepime 2 g every 8 hours, patient will need a 6-week IV antibiotic course from date of surgery (12/11/23 ) and then additional antibiotics will depend on surgical plans-if two-stage is planned would stop after the 6-week course and monitor for at least 2 weeks.  Recommend aspiration of the knee and cultures prior to second stage given history of recurrent infections.    Recommendations for further/ongoing work-up:   --- Follow-up OR cultures   --- Monitor labs      Dispo: Awaiting culture results and work-up as above.  Patient is at risk for infectious complications including limb loss.  Per patient and family would prefer discharge to rehab.       PICC: OK to place picc line - ordered         Plan of care discussed with Dr. Nguyen. ID will follow.      Aurea Ramos M.D.

## 2023-10-31 NOTE — ASSESSMENT & PLAN NOTE
Hx left knee replacement with recurrent infections  Knee aspiration 10/24, cultures not viable  Repeat knee aspiration done 10/26, PCR positive for pseudomonas  Explploration and abx spacer placement 10/30/23  Continue cefepime  ID and ortho following, appreciate recommendations

## 2023-10-31 NOTE — PROGRESS NOTES
Patient with continued Afib with RVR in pacu postop.  Becoming unstable with BP measured < 60/30 x 2.  I gave 2-3 unit boluses of vasopressin, but her BP continued sagging after a few minutes.  Elected to perform emergent synchronized cardioversion in pacu.     Patient fully monitored and pads placed anteriorly and posteriorly.  Administered versed 1 mg by nurse at my direction.  Synchronized cardioversion x 2 at 200J with conversion to sinus vikki around 55 bpm. BP stabilizing.  Advised hospitalist to transfer patient to telemetry vs. IMCU.  Patient tolerated procedure well and followed all commands 5 minutes post cardioversion.     Franki Ybarra M.D.  Anesthesiology.

## 2023-10-31 NOTE — CONSULTS
Critical Care/Pulmonary Consultation    Date of Service: 10/30/2023    Date of Admission:  10/23/2023  6:01 PM    Consulting Physician: Reina Gaston M.D.    Chief Complaint:  Leg Pain (Left knee swelling x1 week )      History of Present Illness: April Alicia is a 63 y.o. female with a past medical history of HTN, Type 2 DM, A-fib (not on anticoagulation), COPD (not on home O2) who presented to our facility on 10/23/23 with complaints of left leg pan and swelling, later found to have septic arthritis of left knee. Initial knee apsiration on 10/24 yielded no viable cultures, repeat on 10/26 found to be positive for cefepime. ID consulted and placed patient on cefepime. Patient underwent hardware explant with antibiotic spacer placement on10/30, and post op was noted to be in a fib RVR with SBP in the 60s. Anesthesiology performed cardioversion x2 with return to sinus rhythm. Patient unfortunately remained hypotensive with SBP in the 60s-70s. Patient received ~3L of IVF and unfortunately did not have any significant improvement in blood pressure. Patient was therefore admitted to ICU for pressor support.    On evaluation of patient in PACU, patient denies any CP, SOB, but does report some mild nausea (denies emesis) and dizziness (described as room spinning, no black out symptoms). In addition to ongoing L knee pain.    Review of Systems   Constitutional:  Negative for chills and fever.   HENT:  Negative for hearing loss and tinnitus.    Eyes:  Negative for blurred vision and double vision.   Respiratory:  Negative for cough and shortness of breath.    Cardiovascular:  Negative for chest pain and palpitations.   Gastrointestinal:  Positive for nausea. Negative for abdominal pain, heartburn and vomiting.   Genitourinary:  Negative for dysuria and urgency.   Neurological:  Positive for dizziness. Negative for tingling and headaches.       Home Medications       Reviewed by Koko Onofre R.N.  (Registered Nurse) on 10/30/23 at 1650  Med List Status: Complete     Medication Last Dose Status   amoxicillin-clavulanate (AUGMENTIN) 875-125 MG Tab 10/23/2023 Active   atorvastatin (LIPITOR) 20 MG Tab  Active   bacitracin 500 UNIT/GM ointment  Active   bupivacaine-0.5%-epinephrine 1:[id number] PF (SENSORCAINE-MPF/EPINEPHRINE) 0.5% -1:[id number] Solution  Active   calcium/vitamin D (OYSTER SHELL CALCIUM/D) 500-200 MG-UNIT Tab  Active   Cholecalciferol (VITAMIN D3) 50 MCG (2000 UT) Tab  Active   ferrous sulfate 325 (65 Fe) MG tablet  Active   furosemide (LASIX) 20 MG Tab  Active   lactulose 20 GM/30ML Solution  Active   losartan (COZAAR) 100 MG Tab  Active   magnesium oxide (MAG-OX) 400 MG Tab tablet  Active   metoprolol SR (TOPROL XL) 25 MG TABLET SR 24 HR  Active   pantoprazole (PROTONIX) 20 MG tablet  Active   potassium chloride (MICRO-K) 10 MEQ capsule  Active   REFRESH TEARS 0.5 % Solution  Active   senna-docusate (PERICOLACE OR SENOKOT S) 8.6-50 MG Tab  Active   sodium bicarbonate (SODIUM BICARBONATE) 650 MG Tab  Active   SPIRIVA HANDIHALER 18 MCG Cap  Active   zinc sulfate (ZINCATE) 220 (50 Zn) MG Cap  Active                    Social History     Tobacco Use    Smoking status: Former     Current packs/day: 0.00     Types: Cigarettes     Quit date: 2016     Years since quittin.8    Smokeless tobacco: Former     Quit date: 2021    Tobacco comments:     a few a day when I can   Vaping Use    Vaping Use: Never used   Substance Use Topics    Alcohol use: Not Currently    Drug use: Not Currently        Past Medical History:   Diagnosis Date    Arrhythmia     Arthritis     OA in knees    ASTHMA     Blood clotting disorder (HCC) 2018    right leg blood clot    Dental disorder     upper and lower dentures    Diabetes     Emphysema of lung (HCC)     Heart abnormality     leakage in left valve    Heart burn     left knee    Heart valve disease     Hypertension     Infection 2017    Infection 2018    Right knee  after total knee    Liver disease     Pneumonia 02/2020    Seizure disorder (HCC)        Past Surgical History:   Procedure Laterality Date    PB REVISE KNEE JOINT REPLACE,ALL PARTS Left 12/28/2022    Procedure: REVISION, TOTAL ARTHROPLASTY, KNEE, ALL COMPONENTS-LEFT;  Surgeon: Wild Luz M.D.;  Location: [facility];  Service: Orthopedics    IRRIGATION & DEBRIDEMENT GENERAL Left 12/28/2022    Procedure: IRRIGATION AND DEBRIDEMENT, WOUND;  Surgeon: Wlid Luz M.D.;  Location: [facility];  Service: Orthopedics    PB REVISE KNEE JOINT REPLACE,ALL PARTS Left 7/19/2022    Procedure: REVISION, TOTAL ARTHROPLASTY, KNEE, ALL COMPONENTS - ANTIBIOTIC SPACER;  Surgeon: Wild Luz M.D.;  Location: [facility];  Service: Orthopedics    IRRIGATION & DEBRIDEMENT GENERAL Left 7/17/2022    Procedure: IRRIGATION AND DEBRIDEMENT, SHOULDER;  Surgeon: Obdulio Gray M.D.;  Location: [facility];  Service: Orthopedics    PB TOTAL KNEE ARTHROPLASTY Left 8/24/2020    Procedure: ARTHROPLASTY, KNEE, TOTAL;  Surgeon: Víctor Faustin M.D.;  Location: [facility];  Service: Orthopedics    KNEE ARTHROPLASTY TOTAL Right 2018    IRRIGATION & DEBRIDEMENT ORTHO Right 9/3/2017    Procedure: IRRIGATION & DEBRIDEMENT ORTHO, POLY EXCHANGE;  Surgeon: Jerome Russell M.D.;  Location: [facility];  Service: Orthopedics    COLONOSCOPY WITH CLIPPING  10/28/2015    Procedure: COLONOSCOPY WITH CLIPPING;  Surgeon: Ruben Colon M.D.;  Location: [facility];  Service:     COLONOSCOPY WITH SCLEROTHERAPY  10/28/2015    Procedure: COLONOSCOPY WITH SCLEROTHERAPY;  Surgeon: Ruben Colon M.D.;  Location: [facility];  Service:     COLONOSCOPY WITH TATTOOING  10/28/2015    Procedure: COLONOSCOPY WITH TATTOOING;  Surgeon: Ruben Colon M.D.;  Location: [facility];  Service:     GYN SURGERY  2003    hysteroscoopy    GYN SURGERY  1982  "   tubal ligation       Allergies: Nkda [no known drug allergy]    History reviewed. No pertinent family history.    Vitals:    10/23/23 1710 10/23/23 1716 10/23/23 1914 10/23/23 2000   Height:  1.727 m (5' 8\")     Weight:  85.3 kg (188 lb)     Weight % change since last entry.:  0 %     BP: 115/71  117/79 138/68   Pulse: 85  85 85   BMI (Calculated):  28.59     Resp: 14  16 16   Temp: 36.9 °C (98.5 °F)      TempSrc: Temporal       10/23/23 2100 10/23/23 2200 10/23/23 2232 10/24/23 0041   Height:       Weight:   90 kg (198 lb 6.6 oz)    Weight % change since last entry.:   5.54 %    BP: 118/78 120/68 134/73    Pulse: 72 73 68    BMI (Calculated):       Resp: 12 15 17 18   Temp:   36.6 °C (97.8 °F)    TempSrc:   Temporal     10/24/23 0411 10/24/23 0807 10/24/23 0811 10/24/23 1000   Height:       Weight:       Weight % change since last entry.:       BP: 96/48  125/61    Pulse: 83  77    BMI (Calculated):       Resp: 18 20 19 18   Temp: 36.9 °C (98.5 °F)  36.8 °C (98.2 °F)    TempSrc: Temporal  Temporal     10/24/23 1517 10/24/23 1739 10/24/23 1945 10/25/23 0426   Height:       Weight:       Weight % change since last entry.:       BP: 111/73  (!) 144/90 135/89   Pulse: 64  78 77   BMI (Calculated):       Resp: 18 20 20 20   Temp: 36.4 °C (97.5 °F)  36.6 °C (97.8 °F) 36.3 °C (97.4 °F)   TempSrc: Temporal  Temporal Temporal    10/25/23 0705 10/25/23 1505 10/25/23 1906 10/25/23 2026   Height:       Weight:       Weight % change since last entry.:       BP: (!) 151/82 108/49 123/57 100/63   Pulse: 82 77 81 60   BMI (Calculated):       Resp: 17 17 18 16   Temp: 36.4 °C (97.6 °F) 36.6 °C (97.8 °F) 36.5 °C (97.7 °F) 36.6 °C (97.8 °F)   TempSrc: Temporal Temporal Temporal Temporal    10/26/23 0350 10/26/23 0800 10/26/23 1600 10/26/23 1921   Height:       Weight:       Weight % change since last entry.:       BP: 104/63 99/64 138/74 131/68   Pulse: 72 76 73 71   BMI (Calculated):       Resp: 17 18 17 18   Temp: 36.7 °C (98.1 " °F) 36.4 °C (97.6 °F) 36.6 °C (97.8 °F) 37.3 °C (99.1 °F)   TempSrc: Temporal Temporal Temporal Temporal    10/26/23 2034 10/27/23 0036 10/27/23 0200 10/27/23 0331   Height:       Weight:       Weight % change since last entry.:       BP:    128/70   Pulse:    60   BMI (Calculated):       Resp: 18 18 18 18   Temp:    36.9 °C (98.5 °F)   TempSrc:    Temporal    10/27/23 0344 10/27/23 0600 10/27/23 0800 10/27/23 1532   Height:       Weight:       Weight % change since last entry.:       BP:   137/85 118/64   Pulse:   62 70   BMI (Calculated):       Resp: 18 18 18 16   Temp:   36.4 °C (97.5 °F) 36.7 °C (98.1 °F)   TempSrc:   Temporal Temporal    10/27/23 1900 10/27/23 2148 10/27/23 2347 10/28/23 0331   Height:       Weight:       Weight % change since last entry.:       BP: 131/85   127/63   Pulse: 65   68   BMI (Calculated):       Resp: 18 18 18 18   Temp: 36.7 °C (98 °F)   36.4 °C (97.5 °F)   TempSrc: Temporal   Temporal    10/28/23 0406 10/28/23 0606 10/28/23 0801 10/28/23 1558   Height:       Weight:       Weight % change since last entry.:       BP:   (!) 158/95 (!) 156/76   Pulse:   85 68   BMI (Calculated):       Resp: 18 18 17 17   Temp:   36.2 °C (97.1 °F) 36.9 °C (98.4 °F)   TempSrc:   Temporal Temporal    10/28/23 1901 10/29/23 0448 10/29/23 0803 10/29/23 1527   Height:       Weight:       Weight % change since last entry.:       BP: 133/61 137/87 (!) 154/80 (!) 151/81   Pulse: 69 80 77 72   BMI (Calculated):       Resp: 19 19 17 18   Temp: 36.6 °C (97.8 °F) 36.6 °C (97.8 °F) 36.4 °C (97.5 °F) 37.1 °C (98.7 °F)   TempSrc: Temporal Temporal Temporal Temporal    10/29/23 1858 10/29/23 2151 10/30/23 0408 10/30/23 0413   Height:       Weight:       Weight % change since last entry.:       BP: 120/65  122/71    Pulse: 68  63    BMI (Calculated):       Resp: 18 17 18 17   Temp: 36.4 °C (97.5 °F)  36.6 °C (97.8 °F)    TempSrc: Temporal  Temporal     10/30/23 0658 10/30/23 1422 10/30/23 1731 10/30/23 1942   Height:        Weight:       Weight % change since last entry.:       BP: 131/75 138/80 (!) 153/70    Pulse: 60 68 68 (!) 151   BMI (Calculated):       Resp: 18 18 16 20   Temp: 36.2 °C (97.2 °F) 36.5 °C (97.7 °F) 36.2 °C (97.2 °F) 35.8 °C (96.5 °F)   TempSrc: Temporal Temporal Temporal Temporal    10/30/23 1950 10/30/23 1954 10/30/23 1955 10/30/23 1957   Height:       Weight:       Weight % change since last entry.:       BP: (!) 52/27 (!) 55/31 (!) 45/26 (!) 59/37   Pulse: (!) 158 (!) 149 (!) 123 (!) 135   BMI (Calculated):       Resp: (!) 22 16 17 (!) 24   Temp:       TempSrc:        10/30/23 2000 10/30/23 2002 10/30/23 2003 10/30/23 2007   Height:       Weight:       Weight % change since last entry.:       BP: (!) 55/38 (!) 67/51 (!) 73/49 133/66   Pulse: (!) 110 (!) 124 (!) 132 (!) 144   BMI (Calculated):       Resp: 18 17 17 18   Temp:       TempSrc:        10/30/23 2009 10/30/23 2012 10/30/23 2015 10/30/23 2019   Height:       Weight:       Weight % change since last entry.:       BP: 107/51 (!) 83/59 (!) 88/56 92/56   Pulse: 70 (!) 53 67 89   BMI (Calculated):       Resp: (!) 24 19 12 19   Temp:       TempSrc:        10/30/23 2021 10/30/23 2024 10/30/23 2027 10/30/23 2028   Height:       Weight:       Weight % change since last entry.:       BP: (!) 80/53 (!) 61/43 (!) 72/51 (!) 81/44   Pulse: 63 64 (!) 53 (!) 58   BMI (Calculated):       Resp: (!) 25 19 14 12   Temp:       TempSrc:        10/30/23 2031 10/30/23 2034 10/30/23 2035 10/30/23 2040   Height:       Weight:       Weight % change since last entry.:       BP: (!) 75/52 (!) 68/41 (!) 68/41 (!) 70/40   Pulse: (!) 56 (!) 53 (!) 55 (!) 55   BMI (Calculated):       Resp: (!) 10 (!) 10 18 17   Temp:       [facility]:        10/30/23 2045 10/30/23 2050 10/30/23 2055 10/30/23 2100   Height:       Weight:       Weight % change since last entry.:       BP: (!) 77/49 92/49 94/55 (!) 89/46   Pulse: (!) 56 (!) 55 (!) 58 (!) 55   BMI (Calculated):       Resp: (!) 22 16 (!)  "10 12   Temp:    36.8 °C (98.2 °F)   TempSrc:    Temporal    10/30/23 2105 10/30/23 2110 10/30/23 2115 10/30/23 2120   Height:       Weight:       Weight % change since last entry.:       BP: (!) 85/48 (!) 84/48 (!) 88/52 (!) 84/48   Pulse: (!) 59 (!) 59 (!) 58 (!) 57   BMI (Calculated):       Resp: (!) 11 14 12 (!) 22   Temp:       TempSrc:           Physical Examination  Physical Exam  Constitutional:       General: She is not in acute distress.     Appearance: She is not ill-appearing.   HENT:      Head: Normocephalic and atraumatic.      Mouth/Throat:      Mouth: Mucous membranes are moist.      Pharynx: Oropharynx is clear.   Cardiovascular:      Rate and Rhythm: Regular rhythm. Bradycardia present.      Pulses: Normal pulses.      Heart sounds: Normal heart sounds.   Pulmonary:      Effort: Pulmonary effort is normal.      Breath sounds: Normal breath sounds.   Abdominal:      General: Abdomen is flat.      Palpations: Abdomen is soft.   Skin:     General: Skin is warm and dry.      Capillary Refill: Capillary refill takes less than 2 seconds.      Comments: LLE in knee brace with dressing c/d/i   Neurological:      General: No focal deficit present.      Mental Status: She is alert and oriented to person, place, and time.   Psychiatric:         Mood and Affect: Mood normal.         Behavior: Behavior normal.             Intake/Output Summary (Last 24 hours) at 10/30/2023 2125  Last data filed at 10/30/2023 1945  Gross per 24 hour   Intake 220 ml   Output 2300 ml   Net -2080 ml           No results for input(s): \"SODIUM\", \"POTASSIUM\", \"CHLORIDE\", \"CO2\", \"BUN\", \"CREATININE\", \"MAGNESIUM\", \"PHOSPHORUS\", \"CALCIUM\" in the last 72 hours.  No results for input(s): \"ALTSGPT\", \"ASTSGOT\", \"ALKPHOSPHAT\", \"TBILIRUBIN\", \"DBILIRUBIN\", \"GAMMAGT\", \"AMYLASE\", \"LIPASE\", \"ALB\", \"PREALBUMIN\", \"GLUCOSE\" in the last 72 hours.      CT-KNEE W/O LEFT   Final Result         1.  Moderate knee joint effusion, quantity of fluid appears " significantly increased since prior study, noncontrast technique limits characterization of fluid.   2.  No acute traumatic bony injury identified.   3.  Slight lucency at the tibial arthroplasty component suggests possible component of hardware loosening.   4.  Atherosclerosis      DX-KNEE 2- LEFT    (Results Pending)       Patient Active Problem List   Diagnosis    Migraine with aura and without status migrainosus, not intractable    Medication overuse headache    History of rheumatic fever    Cirrhosis, alcoholic (HCC)    Diabetic polyneuropathy associated with type 2 diabetes mellitus (HCC)    Acute exacerbation of chronic obstructive pulmonary disease (COPD) (HCC)    Thrombocytopenia (HCC)    Controlled type 2 diabetes mellitus with hyperglycemia, without long-term current use of insulin (HCC)    Hypertension    Cirrhosis (HCC)    Primary osteoarthritis of left knee    Knee effusion, left    Paroxysmal atrial fibrillation, hx of    History of hypertension    Renal insufficiency syndrome    Sepsis (HCC)    ELIZABETH (acute kidney injury) (HCC)    Hyperlipidemia    Acute cystitis without hematuria    Bacteremia    Iron deficiency anemia    Septic arthritis of shoulder, left (HCC)    Septic arthritis of knee, left (HCC)    Pancytopenia (HCC)    Hepatitis C infection    Coagulopathy (HCC)    Erosive osteoarthritis of right hand    Vitamin D deficiency    Protein-calorie malnutrition, severe (HCC)    Septic joint (HCC)    Anemia of chronic disease    Type 2 diabetes mellitus (HCC)    GERD (gastroesophageal reflux disease)    Acute encephalopathy    Abnormal finding of lung    Hepatic encephalopathy (HCC)    Metabolic acidosis    Hypomagnesemia    Fever    H/O: hysterectomy    Murmur    Age-related cataract of both eyes    Chronic instability of knee, left knee    Chronic pain syndrome    Degeneration of lumbar intervertebral disc    Type 2 diabetes mellitus with diabetic neuropathy, unspecified (HCC)    Difficulty in  walking, not elsewhere classified    Dry eyes    Hypermetropia    Hypertensive retinopathy    Hypokalemia    Long-term current use of opiate analgesic    Mitral valve regurgitation    Other malaise    Pain, unspecified    Presbyopia    Pterygium    Sleep apnea    Streptococcus, group b, as the cause of diseases classified elsewhere    Unspecified asthma, uncomplicated    Chronic pain of both knees    Epigastric pain    Thickening of wall of gallbladder    Abdominal pain       Assessment and Plan:    Septic shock due to Pseudomonas species ([facility])  Assessment & Plan  Sepsis 2/2 Pseudomonas infection of L knee. S/p explantation with abx spacer placed on 10/30/23  - Received 3L of IVF in PACU  - Levophed initiated for ongoing hypotension, goal MAP >65  - Am cortisol and lactic acid orders placed  - Cont cefepime as per ID    Type 2 diabetes mellitus ([facility])  Assessment & Plan  A1c 5.4  Monitor blood sugar daily.  Insulin sliding scale for goal -180    Septic arthritis of knee, left ([facility])  Assessment & Plan  Hx left knee replacement with recurrent infections  Knee aspiration 10/24, cultures not viable  Repeat knee aspiration done 10/26, PCR positive for pseudomonas  Explantation and abx spacer placement 10/30/23  Continue cefepime  ID and ortho following, appreciate recommendations    Paroxysmal atrial fibrillation, hx of  Assessment & Plan  Patient with h/o above, not currently on anticoagulation. Per chart review, was on apixaban until about 7/22 during which she was hospitalized for bacteremia and underwent explantation of hardware due to concern for infected joint at that time. Post op she was anemic with Hgb <7 and eliquis was held and never restarted for unclear reason. However patient was eventually started on Multaq. This was also discontinued recently due to patient being found to not have significant A fib burden on evaluation with Cardiology. CHADSVASC 3 (age, htn, dm).  - S/p cardioversion for unstable A  fib RVR in PACU x2, remains hypotensive however now sinus vikki  - Consider reinitiation of home metoprolol when blood pressure stabilizes off of pressors and re initiation of anticoagulation as appropriate    Hypertension  Assessment & Plan  H/o above, home meds are losartan and metoprolol  - Home meds held in setting of septic shock requiring pressor support  - Resume when appropriate

## 2023-10-31 NOTE — ANESTHESIA POSTPROCEDURE EVALUATION
Patient: April Alicia    Procedure Summary     Date: 10/30/23 Room / Location: [place] [address] / SURGERY [facility]    Anesthesia Start: 1750 Anesthesia Stop: 1946    Procedure: LEFT TOTAL KNEE ARTHROPLASTY EXPLANTATION, INSERTION OF ANTIBIOTIC SPACER, AND APPLICATION OF INCISIONAL WOUND VACUUM (Left: Knee) Diagnosis: (CHRONIC INFECTION TOTAL JOINT, LEFT KNEE)    Surgeons: Wild Luz M.D. Responsible Provider: Franki Ybarra M.D.    Anesthesia Type: general ASA Status: 3          Final Anesthesia Type: general  Last vitals  BP   Blood Pressure: (!) 69/40    Temp   36.8 °C (98.2 °F)    Pulse   (!) 58   Resp   12    SpO2   100 %      Anesthesia Post Evaluation    Patient location during evaluation: PACU  Patient participation: complete - patient participated  Level of consciousness: awake and alert    Airway patency: patent  Anesthetic complications: no  Cardiovascular status: hemodynamically stable  Respiratory status: face mask    PONV: none          No notable events documented.     Nurse Pain Score: 10 (NPRS)

## 2023-10-31 NOTE — PROGRESS NOTES
Surgery:  L antibiotic spacer exchange  Date of surgery: 10/30/2023    Subjective: s/p resection antibiotic spacer exchange. Doing well.    Objective:   General: awake, alert  MSK: wound vac working well. Fires EHL, FHL, TA and GSC. SILT in L2-S1 distribution          Labs:   Lab Results   Component Value Date/Time    WBC 18.3 (H) 10/30/2023 03:00 AM    RBC 2.83 (L) 10/30/2023 03:00 AM    HEMOGLOBIN 8.2 (L) 10/30/2023 03:00 AM    HEMATOCRIT 25.2 (L) 10/30/2023 03:00 AM    MCV 89.0 10/30/2023 03:00 AM    MCH 29.0 10/30/2023 03:00 AM    MCHC 32.5 10/30/2023 03:00 AM    MPV 10.3 10/30/2023 03:00 AM    NEUTSPOLYS 89.50 (H) 10/30/2023 03:00 AM    LYMPHOCYTES 6.50 (L) 10/30/2023 03:00 AM    MONOCYTES 2.30 10/30/2023 03:00 AM    EOSINOPHILS 0.20 10/30/2023 03:00 AM    EOSINOPHILS 2 01/02/2023 03:45 PM    BASOPHILS 0.20 10/30/2023 03:00 AM    HYPOCHROMIA 1+ 12/26/2022 10:02 PM    ANISOCYTOSIS 1+ 12/28/2022 07:47 AM        Assessment/Plan: Exchange antibiotic spacer  Weight bearing: WBAT in knee immobilizer at all times   DVT ppx: asa 81 bid  Diet: general  Antibiotics: per ID  Mobilize with PT/OT  Dressing: preveena dressing; leave in place 2 weeks until staples are ready to come out  Disposition: pending progress with PT/OT, medical management

## 2023-10-31 NOTE — OP REPORT
TKA Explant    Referring Provider: No ref. provider found     PCP:  Anum Gatica M.D.    Name:  April Alicia    MRN: [id number]    DATE OF SURGERY: 10/30/23    SURGEON: Wild Luz MD    PREOPERATIVE DIAGNOSIS:  left knee periprosthetic infection    POSTOPERATIVE DIAGNOSIS:  left knee periprosthetic infection    PROCEDURE(s) PERFORMED:  1.  left total knee arthroplasty explantation  2. left knee insertion of articulating spacer   3. left knee application of incisional wound vacuum     Assistants:  Circulator: Koko Onofre R.N.  Scrub Person: Yashira Holley  1st assist:GRACIELA Vegas    Assistants were mandatory to provide adequate exposure for accurate implantation of prosthetic components, while protecting vital neurovascular structures    ANESTHESIOLOGIST:  Anesthesiologist: Kaylee Rahman M.D.; Franki Ybarra M.D.    ANESTHESIA: General    ESTIMATED BLOOD LOSS: 200 cc  TOURNIQUET TIME:  None used    ANTIBIOTICS:  2 g ancef prior to incision    COMPLICATIONS: * No complications entered in OR log *    IMPLANTS:   CR attune femur, size 6  Attune revision tibia 6x18 mm    SPECIMENS: Synovial fluid, anterior synovium, lateral gutter tissue, medial gutter tissue, tibial and femoral canal tissue as well.    AORI CLASSIFICATION:  1.  Femoral: type 2B  2.  Tibial:  type 2B    PATELLAR THICKNESS AFTER EXPLANT:  15    FIXATION:  1.  Femoral:  loose  2.  Tibial:  loose  3.  Patella: loose     INDICATION FOR PROCEDURE: April Alicia is a 63 y.o.  female  who presented with a periprosthetic knee infection. Please refer back to previous notes regarding this. Risks and benefits of surgery were reviewed which included but were not limited to PE, DVT, infection, hardware failure or loosening, reoperation, the need for transfusion and death. Alternatives to surgery had previously been reviewed with the patient as well.  The patient wished to proceed with the above procedure.    PROCEDURE IN  DETAIL: The patient was met in the preoperative holding area. Informed consent was previously obtained in clinic and we reviewed this again. Appropriate site was marked. The patient was taken to the operative theater where the above anesthesia was performed without complication.  Abundant soft roll and a tourniquet were placed high on the thigh of the operative extremity.  The operative lower extremity was then prepped and draped in normal sterile fashion. A formal time-out was performed, identifying correct patient, correct site, and correct procedure.  The patient antibiotics received as above before incision.     At that point, an incision was made through the prior incision down through subcutaneous tissue to fascia. The knee was aspirated and this fluid was sent for culture. The knee was exposed with a medial parapatellar arthrotomy.   Purulence was noted in the joint. I then performed reconstitution of the medial and lateral gutters. This tissue was sent for culture. I did an anterior synovectomy and this tissue was sent for culture as well.    I then remove the polyethylene component without complication. Attention was then turned to the femur. With a combination of a saw and osteotomes I was able to remove the femoral component. The femoral component fixation was as noted above.  Femoral bone loss was noted and classified as noted above.  I freshened my femoral cuts in all planes. I meticulously removed cement as well. I sent tissue culture from the femoral canal.    I then performed a posterior capsulectomy.  My attention was then turned to the tibia. With a combination of a saw and osteotomes I was able to remove the tibia. The  tibial component fixation was as noted above.  Tibial bone loss was noted and classified as stated above.  I freshened my tibial cut. I meticulously removed all cement as well. I did send tissue culture from the tibial canal.    At that point I then irrigated the wound with  approximately 6 L of fluid. I then changed my gloves as well as placed a new split sheet over the surgical field area I then placed my trial femoral and tibial component for fit.  I did leave these components and a little looser than I typically do so that we can have adequate scarring and will become back for the revision surgery this will allow more options on both the tibial and femoral side. I removed these components and I soaked the wound and both the tibial and femoral canal and the wound with a Betadine solution. I then placed the trial components once again. I closed the skin layer with a running 2-0 nylon.    I then removed all instruments from the field. I also reprepped and draped the patient and had new clean instruments. My whole team regowned and draped as well. I then removed the trial components once the wound was reopened. I then copiously irrigated the wound with another 3 L of fluid. I then cemented my femoral and tibial components in place and allowed the cement to cure in extension.  The arthrotomy was closed with #1 Monocryl, subcutaneous tissue closed with 2-0 Monocryl, and I used staples for the skin.  A sterile dressing was placed at the end of the case consisting of a wound vac.  All counts were correct at the end of the case. I was present for all critical portions of the case. The patient was awoken from anesthesia and seemed to tolerate procedure well.     DISPOSTION: Stable to the PACU.  The patient will be weight bearing as tolerated. The patient will be in a hinged knee brace. Anticipated length of hospitalization will be several nights and this will depend on PICC line placement as well as antibiotic treatment and set up with home care.  The patient will have PT/OT while in-house.  Our internal medicine team will assist with medical management.  DVT prophylaxis will begin POD #1.  Follow-up in my clinic has been set up for a wound check already.

## 2023-10-31 NOTE — ASSESSMENT & PLAN NOTE
"\"Patient with h/o above, not currently on anticoagulation. Per chart review, was on apixaban until about 7/22 during which she was hospitalized for bacteremia and underwent explantation of hardware due to concern for infected joint at that time. Post op she was anemic with Hgb <7 and eliquis was held and never restarted for unclear reason. However patient was eventually started on Multaq. This was also discontinued recently due to patient being found to not have significant A fib burden on evaluation with Cardiology. CHADSVASC 3 (age, htn, dm).  - S/p cardioversion for unstable A fib RVR in PACU x2, remains hypotensive however now sinus vikki\" From Dr. Smith's note      Pt is sinus and vikki at this time  Off lovenox for DVT due to acute blood loss anemia  Need to review long term anticoagulation with patient   "

## 2023-10-31 NOTE — OR NURSING
1949: Dr. Dotson notified of HOTN.   1958: Dr. Dotson at bedside.   2012: NELLA Hansen bedside, 1L LR bolus administered.  2023: Patient medicated 1mg Versed IV  2024: 200J shock delivered  2025: 200J shock delivered   2025: Sinus vikki with rare PAC's  2036: Follow-up EKG delivered  2110: Dr. Gaston bedside, patient to be admitted to ICU  2133: Dr. Smith bedside, notified of change in blood pressure.   2158: Updated patient's daughter

## 2023-10-31 NOTE — ASSESSMENT & PLAN NOTE
H/o above, home meds are losartan and metoprolol  - Home meds held in setting of septic shock requiring pressor support  - Resume when appropriate as was hypotensive the past 2 days

## 2023-10-31 NOTE — PROCEDURES
Vascular Access Team     Date of Insertion: 10/31/2023  Arm Circumference: 33  Internal length: 44  External Length: 1  Vein Occupancy %: 41   Reason for PICC: ABx  Labs: WBC 18.3, , BUN 16, Cr 1.02, GFR 62, INR n/a     Consents confirmed, vessel patency confirmed with ultrasound. Risks and benefits of procedure explained to patient and education regarding central line associated bloodstream infections provided. Questions answered.      PICC placed in LUE per licensed provider order with ultrasound guidance.  4 Fr, 1 lumen PICC placed in Basilic vein after 1 attempt(s). 2 mL of 1% lidocaine injected intradermally at the insertion site. A 21 gauge microintroducer needle was visualized entering the vein and modified Seldinger technique was used to obtain access to the vein. 44 cm catheter inserted and brisk blood return was observed from each lumen upon aspiration. Line secured at the 1 cm marker. TCS stylet removed and observed to be fully intact. Each lumen flushed using pulsatile method without resistance with 10 mL 0.9% normal saline. PICC line secured with Biopatch and Tegaderm.     PICC tip placement location is confirmed by nurse to be in the Superior Vena Cava (SVC) utilizing 3CG technology. PICC line is appropriate for use at this time. Patient tolerated procedure well, without complications.  Patient condition relayed to primary RN or ordering physician via this post procedure note in the EMR.      Ultrasound images uploaded to PACS and viewable in the EMR - yes  Ultrasound imaged printed and placed in paper chart - no     BARD Power PICC ref # [id number], Lot # [id number], Expiration Date 12/31/2024

## 2023-10-31 NOTE — ASSESSMENT & PLAN NOTE
Sepsis 2/2 Pseudomonas infection of L knee. S/p explantation with abx spacer placed on 10/30/23  - Received 3L of IVF in PACU  - Levophed initiated for ongoing hypotension, goal MAP >65  - Cont cefepime as per ID    - am corticsol showed level 6.6 stimed and not consistent with adrenal insufficiency  - Stopped dexamethasone  - bp ok and off pressors

## 2023-10-31 NOTE — PROGRESS NOTES
Surgery:  L knee chronic infection  Date of surgery: 10/30/2023    Subjective: Complex history with respect to L knee chronic infection. Previously Strep but now growing pseudomonas. I have discussed at length with her the risks associated with repeat antibiotic spacer exchange; she has yet to clear any infection.     Objective:   General: awake, alert  MSK: L knee with large effusion. Fires EHL, FHL, TA and GSC. SILT in L2-S1 distribution.           Labs:   Lab Results   Component Value Date/Time    WBC 4.8 10/25/2023 05:22 AM    RBC 4.11 (L) 10/25/2023 05:22 AM    HEMOGLOBIN 11.8 (L) 10/25/2023 05:22 AM    HEMATOCRIT 36.7 (L) 10/25/2023 05:22 AM    MCV 89.3 10/25/2023 05:22 AM    MCH 28.7 10/25/2023 05:22 AM    MCHC 32.2 10/25/2023 05:22 AM    MPV 11.5 10/25/2023 05:22 AM    NEUTSPOLYS 63.50 10/24/2023 05:03 AM    LYMPHOCYTES 20.80 (L) 10/24/2023 05:03 AM    MONOCYTES 12.50 10/24/2023 05:03 AM    EOSINOPHILS 2.20 10/24/2023 05:03 AM    EOSINOPHILS 2 01/02/2023 03:45 PM    BASOPHILS 0.60 10/24/2023 05:03 AM    HYPOCHROMIA 1+ 12/26/2022 10:02 PM    ANISOCYTOSIS 1+ 12/28/2022 07:47 AM        Assessment/Plan:   62Yo F with chronic septic total knee arthroplasty. Please refer back to previous notes regarding this. Risks and benefits of surgery were reviewed which included but were not limited to PE, DVT, infection, hardware failure or loosening, reoperation, the need for transfusion and death. Alternatives to surgery had previously been reviewed with the patient as well.  The patient wished to proceed with the above procedure.

## 2023-10-31 NOTE — ANESTHESIA TIME REPORT
Anesthesia Start and Stop Event Times     Date Time Event    10/30/2023 1746 Ready for Procedure     1750 Anesthesia Start     1946 Anesthesia Stop        Responsible Staff  10/30/23    Name Role Begin End    Kaylee Rahman M.D. Anesth 1750 1849    Franki Ybarra M.D. Anesth 1849 1946        Overtime Reason:  no overtime (within assigned shift)    Comments:

## 2023-10-31 NOTE — ANESTHESIA PREPROCEDURE EVALUATION
Case: [id number] Date/Time: 10/30/23 1700    Procedure: ARTHROPLASTY, KNEE, TOTAL FOR ANTIBIOTIC SPACER REDO (Left)    Location: [facility] OR 11 / SURGERY [facility]    Surgeons: Wild Luz M.D.          Relevant Problems   ANESTHESIA   (positive) Sleep apnea      PULMONARY   (positive) Acute exacerbation of chronic obstructive pulmonary disease (COPD) (HCC)   (positive) Unspecified asthma, uncomplicated      NEURO   (positive) Medication overuse headache   (positive) Migraine with aura and without status migrainosus, not intractable      CARDIAC   (positive) Hypertension   (positive) Migraine with aura and without status migrainosus, not intractable   (positive) Mitral valve regurgitation   (positive) Murmur   (positive) Paroxysmal atrial fibrillation, hx of      GI   (positive) GERD (gastroesophageal reflux disease)         (positive) ELIZABETH (acute kidney injury) (HCC)   (positive) Cirrhosis (HCC)   (positive) Cirrhosis, alcoholic (HCC)   (positive) Hepatitis C infection   (positive) Renal insufficiency syndrome      ENDO   (positive) Type 2 diabetes mellitus (HCC)      Other   (positive) Septic arthritis of knee, left (HCC)   (positive) Septic arthritis of shoulder, left (HCC)   (positive) Septic joint (HCC)       Physical Exam    Airway   Mallampati: II  TM distance: >3 FB  Neck ROM: full       Cardiovascular - normal exam  Rhythm: regular  Rate: normal  (-) murmur     Dental   (+) upper dentures, lower dentures           Pulmonary - normal exam  Breath sounds clear to auscultation     Abdominal    Neurological - normal exam                 Anesthesia Plan    ASA 3   ASA physical status 3 criteria: COPD - poorly controlled    Plan - general       Airway plan will be ETT          Induction: intravenous    Postoperative Plan: Postoperative administration of opioids is intended.    Pertinent diagnostic labs and testing reviewed    Informed Consent:    Anesthetic plan and risks discussed with patient.    Use of blood  products discussed with: patient whom consented to blood products.

## 2023-10-31 NOTE — ANESTHESIA PROCEDURE NOTES
Airway    Date/Time: 10/30/2023 5:58 PM    Performed by: Kaylee Rahman M.D.  Authorized by: Kaylee Rahman M.D.    Location:  OR  Urgency:  Elective  Indications for Airway Management:  Anesthesia      Spontaneous Ventilation: absent    Sedation Level:  Deep  Preoxygenated: Yes    Patient Position:  Sniffing  Mask Difficulty Assessment:  0 - not attempted  Final Airway Type:  Endotracheal airway  Final Endotracheal Airway:  ETT  Cuffed: Yes    Technique Used for Successful ETT Placement:  Direct laryngoscopy    Insertion Site:  Oral  Blade Type:  Rnoi  Laryngoscope Blade/Videolaryngoscope Blade Size:  3  ETT Size (mm):  6.5  Measured from:  Gums  ETT to Gums (cm):  22  Placement Verified by: auscultation and capnometry    Cormack-Lehane Classification:  Grade I - full view of glottis  Number of Attempts at Approach:  1

## 2023-10-31 NOTE — DISCHARGE PLANNING
[facility] Transitional Care Coordination    Referral from: Dr. Lindsey Nguyen    Insurance Provider on Facesheet: LAND[employer] FFS    Potential Rehab Diagnosis: Medically Complex: Infections    Chart review indicates patient may have on going medical management and may have therapy needs to possibly meet inpatient rehab facility criteria with the goal of returning to community.    D/C support will need to be verified: Daughter    Physiatry consultation pended per protocol.  TX pending.     Thank you for the referral.

## 2023-10-31 NOTE — DISCHARGE PLANNING
Case Management Discharge Planning    Admission Date: 10/23/2023  GMLOS: 3.9  ALOS: 8    6-Clicks ADL Score: 18  6-Clicks Mobility Score: 18      Anticipated Discharge Disposition: Disch to IP rehab facility or distinct part unit (62)    DME Needed: No    Action(s) Taken: CM met at bedside with patient & granddaughter, Marielos, to complete assessment and discuss DCP.   Patient, Hard of Hearing, AAOx4 at time of interview, reported residing in single level house with her daughter/DPOA, Ava.   Prior to admission, patient was independent with ADLs and ambulating with a cane and/or rollator.   Other DME reported:  Bilateral hearing aids (currently broken).    Patient collects disability ($980/month) & is a SNAP recipient.   She denies any Hx of ETOH or drug abuse.    PCP:  Dr. Anum Gatica @ [facility].    DCP to IRF vs SNF discussed with patient who is agreeable to either one.   CM to f/u and submit referrals as appropriate.    Escalations Completed: None    Medically Clear: No    Next Steps: CM will f/u on PT/OT eval, medical clearance, and IRF/SNF referrals    Barriers to Discharge: Medical clearance, Pending P/OT eval for DC recs    Is the patient up for discharge tomorrow: No      Care Transition Team Assessment    Information Source  Orientation Level: Oriented X4  Information Given By: Patient  Who is responsible for making decisions for patient? : Patient    Readmission Evaluation  Is this a readmission?: No    Elopement Risk  Legal Hold: No  Ambulatory or Self Mobile in Wheelchair: Yes  Disoriented: No  Psychiatric Symptoms: None  History of Wandering: No  Elopement this Admit: No  Vocalizing Wanting to Leave: No  Displays Behaviors, Body Language Wanting to Leave: No-Not at Risk for Elopement  Elopement Risk: Not at Risk for Elopement    Interdisciplinary Discharge Planning  Lives with - Patient's Self Care Capacity: Adult Children  Patient or legal guardian wants to designate a caregiver:  No  Support Systems: Children, Friends / Neighbors  Housing / Facility: 1 Story House  Durable Medical Equipment: Not Applicable    Discharge Preparedness  What is your plan after discharge?: Other (comment) (IRF vs SNF)  What are your discharge supports?: Child, Other (comment) (Grandchildren)  Prior Functional Level: Independent with Activities of Daily Living, Ambulatory, Uses Walker    Functional Assesment  Prior Functional Level: Independent with Activities of Daily Living, Ambulatory, Uses Walker    Finances  Financial Barriers to Discharge: No  Prescription Coverage: Yes    Vision / Hearing Impairment  Vision Impairment : Yes  Right Eye Vision: Impaired, Wears Glasses  Left Eye Vision: Impaired, Wears Glasses  Hearing Impairment : Yes  Hearing Impairment: Both Ears, Hearing Device Not Available  Does Pt Need Special Equipment for the Hearing Impaired?: No     Advance Directive  Advance Directive?: DPOA for Health Care (On file)  Durable Power of  Name and Contact : Avabria Jonasneros #[phone]    Domestic Abuse  Have you ever been the victim of abuse or violence?: No  Physical Abuse or Sexual Abuse: No  Verbal Abuse or Emotional Abuse: No  Possible Abuse/Neglect Reported to:: Not Applicable    Psychological Assessment  History of Substance Abuse: None  History of Psychiatric Problems: No  Non-compliant with Treatment: No  Newly Diagnosed Illness: Yes    Discharge Risks or Barriers  Discharge risks or barriers?: Complex medical needs  Patient risk factors: Complex medical needs    Anticipated Discharge Information  Discharge Disposition: Disch to  rehab facility or distinct part unit (62)

## 2023-10-31 NOTE — CARE PLAN
The patient is Stable - Low risk of patient condition declining or worsening    Shift Goals  Clinical Goals: Patient will report pain less than 5 before end of shift.  Patient Goals: rest  Family Goals: gisele    Progress made toward(s) clinical / shift goals:    Problem: Fall Risk  Goal: Patient will remain free from falls  Outcome: Progressing     Problem: Knowledge Deficit - Standard  Goal: Patient and family/care givers will demonstrate understanding of plan of care, disease process/condition, diagnostic tests and medications  Outcome: Progressing     Problem: Pain - Standard  Goal: Alleviation of pain or a reduction in pain to the patient’s comfort goal  Outcome: Progressing       Patient is not progressing towards the following goals:

## 2023-10-31 NOTE — PROGRESS NOTES
4 Eyes Skin Assessment Completed by Porfirio RN and DANYEL Flores.    Head WDL  Ears WDL  Nose WDL  Mouth WDL  Neck WDL  Breast/Chest WDL  Shoulder Blades WDL  Spine WDL  (R) Arm/Elbow/Hand Bruising  (L) Arm/Elbow/Hand WDL  Abdomen WDL  Groin WDL  Scrotum/Coccyx/Buttocks WDL  (R) Leg Scar  (L) Leg Incision  (R) Heel/Foot/Toe WDL  (L) Heel/Foot/Toe WDL          Devices In Places ECG, Blood Pressure Cuff, Pulse Ox, Amaro, and SCD's      Interventions In Place Gray Ear Foams, Sacral Mepilex, Pillows, Q2 Turns, and Low Air Loss Mattress    Possible Skin Injury No    Pictures Uploaded Into Epic N/A  Wound Consult Placed N/A  RN Wound Prevention Protocol Ordered No

## 2023-11-01 PROBLEM — D62 ACUTE BLOOD LOSS ANEMIA: Status: ACTIVE | Noted: 2023-11-01

## 2023-11-01 PROBLEM — K82.8 THICKENING OF WALL OF GALLBLADDER: Status: RESOLVED | Noted: 2023-04-19 | Resolved: 2023-11-01

## 2023-11-01 PROBLEM — H11.009 PTERYGIUM: Status: RESOLVED | Noted: 2022-03-03 | Resolved: 2023-11-01

## 2023-11-01 PROBLEM — N28.9 RENAL INSUFFICIENCY SYNDROME: Status: RESOLVED | Noted: 2021-09-02 | Resolved: 2023-11-01

## 2023-11-01 PROBLEM — A41.9 SEPSIS (HCC): Status: RESOLVED | Noted: 2021-08-11 | Resolved: 2023-11-01

## 2023-11-01 PROBLEM — B95.1 STREPTOCOCCUS, GROUP B, AS THE CAUSE OF DISEASES CLASSIFIED ELSEWHERE: Status: RESOLVED | Noted: 2022-08-04 | Resolved: 2023-11-01

## 2023-11-01 LAB
ABO GROUP BLD: NORMAL
ANION GAP SERPL CALC-SCNC: 9 MMOL/L (ref 7–16)
BARCODED ABORH UBTYP: 5100
BARCODED ABORH UBTYP: 5100
BARCODED PRD CODE UBPRD: NORMAL
BARCODED PRD CODE UBPRD: NORMAL
BARCODED UNIT NUM UBUNT: NORMAL
BARCODED UNIT NUM UBUNT: NORMAL
BASOPHILS # BLD AUTO: 0.1 % (ref 0–1.8)
BASOPHILS # BLD: 0.01 K/UL (ref 0–0.12)
BLD GP AB SCN SERPL QL: NORMAL
BUN SERPL-MCNC: 29 MG/DL (ref 8–22)
CALCIUM SERPL-MCNC: 6.9 MG/DL (ref 8.5–10.5)
CHLORIDE SERPL-SCNC: 101 MMOL/L (ref 96–112)
CO2 SERPL-SCNC: 19 MMOL/L (ref 20–33)
COMPONENT R 8504R: NORMAL
COMPONENT R 8504R: NORMAL
CREAT SERPL-MCNC: 1.12 MG/DL (ref 0.5–1.4)
EOSINOPHIL # BLD AUTO: 0 K/UL (ref 0–0.51)
EOSINOPHIL NFR BLD: 0 % (ref 0–6.9)
ERYTHROCYTE [DISTWIDTH] IN BLOOD BY AUTOMATED COUNT: 47.1 FL (ref 35.9–50)
ERYTHROCYTE [DISTWIDTH] IN BLOOD BY AUTOMATED COUNT: 47.4 FL (ref 35.9–50)
GFR SERPLBLD CREATININE-BSD FMLA CKD-EPI: 55 ML/MIN/1.73 M 2
GLUCOSE BLD STRIP.AUTO-MCNC: 206 MG/DL (ref 65–99)
GLUCOSE BLD STRIP.AUTO-MCNC: 212 MG/DL (ref 65–99)
GLUCOSE BLD STRIP.AUTO-MCNC: 227 MG/DL (ref 65–99)
GLUCOSE BLD STRIP.AUTO-MCNC: 254 MG/DL (ref 65–99)
GLUCOSE SERPL-MCNC: 230 MG/DL (ref 65–99)
HCT VFR BLD AUTO: 18.2 % (ref 37–47)
HCT VFR BLD AUTO: 18.6 % (ref 37–47)
HCT VFR BLD AUTO: 20.5 % (ref 37–47)
HGB BLD-MCNC: 5.8 G/DL (ref 12–16)
HGB BLD-MCNC: 6 G/DL (ref 12–16)
HGB BLD-MCNC: 6.7 G/DL (ref 12–16)
IMM GRANULOCYTES # BLD AUTO: 0.16 K/UL (ref 0–0.11)
IMM GRANULOCYTES NFR BLD AUTO: 1 % (ref 0–0.9)
LACTATE SERPL-SCNC: 1.7 MMOL/L (ref 0.5–2)
LYMPHOCYTES # BLD AUTO: 1.74 K/UL (ref 1–4.8)
LYMPHOCYTES NFR BLD: 11.1 % (ref 22–41)
MAGNESIUM SERPL-MCNC: 2.5 MG/DL (ref 1.5–2.5)
MCH RBC QN AUTO: 28.3 PG (ref 27–33)
MCH RBC QN AUTO: 28.4 PG (ref 27–33)
MCHC RBC AUTO-ENTMCNC: 31.9 G/DL (ref 32.2–35.5)
MCHC RBC AUTO-ENTMCNC: 32.3 G/DL (ref 32.2–35.5)
MCV RBC AUTO: 88.2 FL (ref 81.4–97.8)
MCV RBC AUTO: 88.8 FL (ref 81.4–97.8)
MONOCYTES # BLD AUTO: 0.49 K/UL (ref 0–0.85)
MONOCYTES NFR BLD AUTO: 3.1 % (ref 0–13.4)
NEUTROPHILS # BLD AUTO: 13.32 K/UL (ref 1.82–7.42)
NEUTROPHILS NFR BLD: 84.7 % (ref 44–72)
NRBC # BLD AUTO: 0 K/UL
NRBC BLD-RTO: 0 /100 WBC (ref 0–0.2)
PLATELET # BLD AUTO: 109 K/UL (ref 164–446)
PLATELET # BLD AUTO: 109 K/UL (ref 164–446)
PMV BLD AUTO: 10 FL (ref 9–12.9)
PMV BLD AUTO: 9.9 FL (ref 9–12.9)
POTASSIUM SERPL-SCNC: 4.7 MMOL/L (ref 3.6–5.5)
PRODUCT TYPE UPROD: NORMAL
PRODUCT TYPE UPROD: NORMAL
RBC # BLD AUTO: 2.05 M/UL (ref 4.2–5.4)
RBC # BLD AUTO: 2.11 M/UL (ref 4.2–5.4)
RH BLD: NORMAL
SODIUM SERPL-SCNC: 129 MMOL/L (ref 135–145)
UNIT STATUS USTAT: NORMAL
UNIT STATUS USTAT: NORMAL
WBC # BLD AUTO: 15.7 K/UL (ref 4.8–10.8)
WBC # BLD AUTO: 16.3 K/UL (ref 4.8–10.8)

## 2023-11-01 PROCEDURE — 700105 HCHG RX REV CODE 258: Performed by: INTERNAL MEDICINE

## 2023-11-01 PROCEDURE — 86901 BLOOD TYPING SEROLOGIC RH(D): CPT

## 2023-11-01 PROCEDURE — 700102 HCHG RX REV CODE 250 W/ 637 OVERRIDE(OP): Performed by: INTERNAL MEDICINE

## 2023-11-01 PROCEDURE — 80048 BASIC METABOLIC PNL TOTAL CA: CPT

## 2023-11-01 PROCEDURE — 36430 TRANSFUSION BLD/BLD COMPNT: CPT

## 2023-11-01 PROCEDURE — 85027 COMPLETE CBC AUTOMATED: CPT

## 2023-11-01 PROCEDURE — 82962 GLUCOSE BLOOD TEST: CPT

## 2023-11-01 PROCEDURE — 85025 COMPLETE CBC W/AUTO DIFF WBC: CPT

## 2023-11-01 PROCEDURE — 700111 HCHG RX REV CODE 636 W/ 250 OVERRIDE (IP): Mod: JZ | Performed by: INTERNAL MEDICINE

## 2023-11-01 PROCEDURE — 99291 CRITICAL CARE FIRST HOUR: CPT | Performed by: INTERNAL MEDICINE

## 2023-11-01 PROCEDURE — 85018 HEMOGLOBIN: CPT

## 2023-11-01 PROCEDURE — 770020 HCHG ROOM/CARE - TELE (206)

## 2023-11-01 PROCEDURE — 83735 ASSAY OF MAGNESIUM: CPT

## 2023-11-01 PROCEDURE — 85014 HEMATOCRIT: CPT

## 2023-11-01 PROCEDURE — A9270 NON-COVERED ITEM OR SERVICE: HCPCS | Performed by: INTERNAL MEDICINE

## 2023-11-01 PROCEDURE — 86900 BLOOD TYPING SEROLOGIC ABO: CPT

## 2023-11-01 PROCEDURE — 36415 COLL VENOUS BLD VENIPUNCTURE: CPT

## 2023-11-01 PROCEDURE — 30233N1 TRANSFUSION OF NONAUTOLOGOUS RED BLOOD CELLS INTO PERIPHERAL VEIN, PERCUTANEOUS APPROACH: ICD-10-PCS | Performed by: STUDENT IN AN ORGANIZED HEALTH CARE EDUCATION/TRAINING PROGRAM

## 2023-11-01 PROCEDURE — 83605 ASSAY OF LACTIC ACID: CPT

## 2023-11-01 PROCEDURE — 99233 SBSQ HOSP IP/OBS HIGH 50: CPT | Performed by: HOSPITALIST

## 2023-11-01 PROCEDURE — P9016 RBC LEUKOCYTES REDUCED: HCPCS | Mod: 91

## 2023-11-01 PROCEDURE — 86923 COMPATIBILITY TEST ELECTRIC: CPT | Mod: 91

## 2023-11-01 PROCEDURE — 86850 RBC ANTIBODY SCREEN: CPT

## 2023-11-01 RX ADMIN — OXYCODONE HYDROCHLORIDE 10 MG: 10 TABLET ORAL at 04:09

## 2023-11-01 RX ADMIN — INSULIN HUMAN 2 UNITS: 100 INJECTION, SOLUTION PARENTERAL at 21:15

## 2023-11-01 RX ADMIN — METOPROLOL SUCCINATE 25 MG: 25 TABLET, EXTENDED RELEASE ORAL at 21:12

## 2023-11-01 RX ADMIN — CEFEPIME 2 G: 2 INJECTION, POWDER, FOR SOLUTION INTRAVENOUS at 13:48

## 2023-11-01 RX ADMIN — DOCUSATE SODIUM 50 MG AND SENNOSIDES 8.6 MG 2 TABLET: 8.6; 5 TABLET, FILM COATED ORAL at 05:08

## 2023-11-01 RX ADMIN — ATORVASTATIN CALCIUM 20 MG: 20 TABLET, FILM COATED ORAL at 21:12

## 2023-11-01 RX ADMIN — FUROSEMIDE 20 MG: 40 TABLET ORAL at 05:08

## 2023-11-01 RX ADMIN — OXYCODONE HYDROCHLORIDE 10 MG: 10 TABLET ORAL at 13:34

## 2023-11-01 RX ADMIN — POTASSIUM CHLORIDE 10 MEQ: 20 TABLET, EXTENDED RELEASE ORAL at 05:09

## 2023-11-01 RX ADMIN — INSULIN HUMAN 3 UNITS: 100 INJECTION, SOLUTION PARENTERAL at 13:36

## 2023-11-01 RX ADMIN — OXYCODONE HYDROCHLORIDE 5 MG: 5 TABLET ORAL at 08:25

## 2023-11-01 RX ADMIN — CEFEPIME 2 G: 2 INJECTION, POWDER, FOR SOLUTION INTRAVENOUS at 05:07

## 2023-11-01 RX ADMIN — CEFEPIME 2 G: 2 INJECTION, POWDER, FOR SOLUTION INTRAVENOUS at 21:37

## 2023-11-01 RX ADMIN — OMEPRAZOLE 20 MG: 20 CAPSULE, DELAYED RELEASE ORAL at 05:08

## 2023-11-01 RX ADMIN — SODIUM CHLORIDE: 9 INJECTION, SOLUTION INTRAVENOUS at 04:08

## 2023-11-01 RX ADMIN — INSULIN HUMAN 2 UNITS: 100 INJECTION, SOLUTION PARENTERAL at 17:36

## 2023-11-01 RX ADMIN — INSULIN HUMAN 2 UNITS: 100 INJECTION, SOLUTION PARENTERAL at 06:05

## 2023-11-01 RX ADMIN — DOCUSATE SODIUM 50 MG AND SENNOSIDES 8.6 MG 2 TABLET: 8.6; 5 TABLET, FILM COATED ORAL at 17:37

## 2023-11-01 RX ADMIN — OXYCODONE HYDROCHLORIDE 10 MG: 10 TABLET ORAL at 19:43

## 2023-11-01 RX ADMIN — CALCIUM CHLORIDE 1000 MG: 100 INJECTION, SOLUTION INTRAVENOUS at 05:45

## 2023-11-01 RX ADMIN — DEXAMETHASONE SODIUM PHOSPHATE 4 MG: 4 INJECTION INTRA-ARTICULAR; INTRALESIONAL; INTRAMUSCULAR; INTRAVENOUS; SOFT TISSUE at 05:08

## 2023-11-01 RX ADMIN — TIOTROPIUM BROMIDE INHALATION SPRAY 5 MCG: 3.12 SPRAY, METERED RESPIRATORY (INHALATION) at 05:10

## 2023-11-01 RX ADMIN — OXYCODONE HYDROCHLORIDE 10 MG: 10 TABLET ORAL at 23:20

## 2023-11-01 ASSESSMENT — ENCOUNTER SYMPTOMS
NEUROLOGICAL NEGATIVE: 1
CARDIOVASCULAR NEGATIVE: 1
SHORTNESS OF BREATH: 0
CONSTITUTIONAL NEGATIVE: 1
GASTROINTESTINAL NEGATIVE: 1
NAUSEA: 1
PSYCHIATRIC NEGATIVE: 1
EYES NEGATIVE: 1
RESPIRATORY NEGATIVE: 1

## 2023-11-01 ASSESSMENT — PAIN DESCRIPTION - PAIN TYPE
TYPE: SURGICAL PAIN
TYPE: ACUTE PAIN
TYPE: SURGICAL PAIN
TYPE: SURGICAL PAIN
TYPE: ACUTE PAIN
TYPE: SURGICAL PAIN
TYPE: ACUTE PAIN

## 2023-11-01 NOTE — PROGRESS NOTES
"Pulmonary Progress Note    Date of admission  10/23/2023    Chief Complaint  63 y.o. female admitted 10/23/2023 with knee pain    Hospital Course  64 yo female with past medical history significant for hypertension, diabetes, cirrhosis, history of bilateral knee replacements, and atrial fibrillation who was admitted on 10/23/2023 for left knee swelling and pain and found to have septic arthritis with Pseudomonas growing from knee aspirate on Cefepime.  The patient underwent knee washout and explant of hardware with antibiotic spacer placed this evening.  The patient went into atrial fibrillation with RVR post operatively with SBP in the 60s.  Anesthesiology performed cardioversion x 2 with SR, but the patient remains hypotensive.  She received 3 liters IVF and will now need levophed.  She will be transferred to the ICU for ongoing care.\" DR. Schultz note    10/31: random cortisol 6.6   Latest Reference Range & Units 10/30/23 03:00 10/31/23 11:22 10/31/23 12:10   Cortisol 0.0 - 23.0 ug/dL 6.6 11.7 15.7     11/1: hbg dropped to 5.8; off levophed- one unit PRBC    Interval Problem Update  Reviewed last 24 hour events:  Chart review from the past 24 hours includes imaging, laboratory studies, vital signs and notes available.  Pertinent data for today    Cardiac:  off levophed this am  Pulm:  1 l  Neuro:  intact  Heme: hbg dropped to 5.8 and   I/O:  +[phone]  ID: cefepime  GI/endo:  low cortisol but did not still to support adrenal insufficiency  Labs/Imaging:  reviewed  Lines:  peripheral  Mobility:  2  Symptoms: knee pain      Review of Systems  Review of Systems   Constitutional: Negative.    HENT: Negative.     Eyes: Negative.    Respiratory: Negative.     Cardiovascular: Negative.    Gastrointestinal: Negative.    Genitourinary: Negative.    Musculoskeletal:  Positive for joint pain.   Skin: Negative.    Neurological: Negative.    Endo/Heme/Allergies: Negative.    Psychiatric/Behavioral: Negative.          Vital " Signs for last 24 hours   Temp:  [36.3 °C (97.4 °F)-36.8 °C (98.3 °F)] 36.5 °C (97.7 °F)  Pulse:  [49-67] 64  Resp:  [11-58] 17  BP: ()/(45-61) 92/51  SpO2:  [93 %-100 %] 95 %          Physical Exam   Physical Exam  Constitutional:       Appearance: She is ill-appearing.   HENT:      Head: Normocephalic and atraumatic.      Mouth/Throat:      Mouth: Mucous membranes are moist.   Eyes:      Extraocular Movements: Extraocular movements intact.      Pupils: Pupils are equal, round, and reactive to light.   Cardiovascular:      Rate and Rhythm: Regular rhythm. Bradycardia present.   Pulmonary:      Effort: Pulmonary effort is normal.      Breath sounds: Normal breath sounds.   Abdominal:      General: Abdomen is flat. Bowel sounds are normal.      Palpations: Abdomen is soft.   Musculoskeletal:      Cervical back: Normal range of motion.      Comments: Left knee in a brace and limited movement due to pain   Skin:     General: Skin is warm and dry.   Neurological:      General: No focal deficit present.      Mental Status: She is alert and oriented to person, place, and time.   Psychiatric:         Mood and Affect: Mood normal.         Behavior: Behavior normal.         Thought Content: Thought content normal.         Judgment: Judgment normal.         Medications  Current Facility-Administered Medications   Medication Dose Route Frequency Provider Last Rate Last Admin    tiotropium (Spiriva Respimat) 2.5 mcg/Act inhalation spray 5 mcg  5 mcg Inhalation DAILY Grant Richards M.D.   5 mcg at 11/01/23 0510    cefepime (Maxipime) 2 g in  mL IVPB  2 g Intravenous Q8HRS Aurea Ramos M.D.   Stopped at 11/01/23 0537    diphenhydrAMINE-zinc acetate (Benadryl Itch) topical cream   Topical TID PRN JAMEE ParkRErrolNErrol        insulin regular (HumuLIN R,NovoLIN R) injection  1-6 Units Subcutaneous 4X/DAY MAX Barth M.D.   2 Units at 11/01/23 0605    And    dextrose 10 % BOLUS 25 g  25 g Intravenous  Q15 MIN PRN Shai Barth M.D.        omeprazole (PriLOSEC) capsule 20 mg  20 mg Oral DAILY Shai Barth M.D.   20 mg at 11/01/23 0508    potassium chloride SA (Kdur) tablet 10 mEq  10 mEq Oral DAILY Shai Barth M.D.   10 mEq at 11/01/23 0509    senna-docusate (Pericolace Or Senokot S) 8.6-50 MG per tablet 2 Tablet  2 Tablet Oral BID Shai Barth M.D.   2 Tablet at 11/01/23 0508    And    polyethylene glycol/lytes (Miralax) PACKET 1 Packet  1 Packet Oral QDAY PRN Shai Barth M.D.        And    magnesium hydroxide (Milk Of Magnesia) suspension 30 mL  30 mL Oral QDAY PRN Shai Barth M.D.        And    bisacodyl (Dulcolax) suppository 10 mg  10 mg Rectal QDAY PRN Shai Barth M.D.        [Held by provider] enoxaparin (Lovenox) inj 40 mg  40 mg Subcutaneous DAILY AT 1800 Shai Barth M.D.   40 mg at 10/31/23 1809    acetaminophen (Tylenol) tablet 650 mg  650 mg Oral Q6HRS PRN Shai Barth M.D.        Pharmacy Consult Request ...Pain Management Review 1 Each  1 Each Other PHARMACY TO DOSE Shai Barth M.D.        oxyCODONE immediate-release (Roxicodone) tablet 5 mg  5 mg Oral Q3HRS PRN Shai Barth M.D.   5 mg at 11/01/23 0825    Or    oxyCODONE immediate release (Roxicodone) tablet 10 mg  10 mg Oral Q3HRS PRN Shai Barth M.D.   10 mg at 11/01/23 0409    Or    morphine 4 MG/ML injection 4 mg  4 mg Intravenous Q3HRS PRN Shai Barth M.D.   4 mg at 10/31/23 0600    labetalol (Normodyne/Trandate) injection 10 mg  10 mg Intravenous Q4HRS PRN Sahi Barth M.D.        ondansetron (Zofran) syringe/vial injection 4 mg  4 mg Intravenous Q4HRS PRN Shai Barth M.D.        ondansetron (Zofran ODT) dispertab 4 mg  4 mg Oral Q4HRS PRN Shai Barth M.D.        promethazine (Phenergan) tablet 12.5-25 mg  12.5-25 mg Oral Q4HRS PRN Shai Barth M.D.        promethazine (Phenergan) suppository 12.5-25 mg  12.5-25 mg Rectal Q4HRS PRJAYCE Barth  M.D.        prochlorperazine (Compazine) injection 5-10 mg  5-10 mg Intravenous Q4HRS PRN Shai Barth M.D.        atorvastatin (Lipitor) tablet 20 mg  20 mg Oral Nightly Shai Barth M.D.   20 mg at 10/31/23 2100    furosemide (Lasix) tablet 20 mg  20 mg Oral DAILY Shai Barth M.D.   20 mg at 11/01/23 0508    losartan (Cozaar) tablet 50 mg  50 mg Oral DAILY Shai Barth M.D.   50 mg at 10/30/23 0434    metoprolol SR (Toprol XL) tablet 25 mg  25 mg Oral Nightly Shai Barth M.D.   25 mg at 10/29/23 2101       Fluids    Intake/Output Summary (Last 24 hours) at 11/1/2023 1225  Last data filed at 11/1/2023 1030  Gross per 24 hour   Intake 3072.06 ml   Output 500 ml   Net 2572.06 ml       Laboratory          Recent Labs     10/30/23  0300 11/01/23  0300   SODIUM 135 129*   POTASSIUM 4.2 4.7   CHLORIDE 104 101   CO2 21 19*   BUN 16 29*   CREATININE 1.02 1.12   MAGNESIUM 2.9* 2.5   CALCIUM 7.5* 6.9*     Recent Labs     10/30/23  0300 11/01/23  0300   GLUCOSE 176* 230*     Recent Labs     10/30/23  0300 11/01/23  0300 11/01/23  0455   WBC 18.3* 15.7* 16.3*   NEUTSPOLYS 89.50* 84.70*  --    LYMPHOCYTES 6.50* 11.10*  --    MONOCYTES 2.30 3.10  --    EOSINOPHILS 0.20 0.00  --    BASOPHILS 0.20 0.10  --      Recent Labs     10/30/23  0300 11/01/23  0300 11/01/23  0455   RBC 2.83* 2.11* 2.05*   HEMOGLOBIN 8.2* 6.0* 5.8*   HEMATOCRIT 25.2* 18.6* 18.2*   PLATELETCT 167 109* 109*        Assessment/Plan  Septic shock due to Pseudomonas species ([facility])  Assessment & Plan  Sepsis 2/2 Pseudomonas infection of L knee. S/p explantation with abx spacer placed on 10/30/23  - Received 3L of IVF in PACU  - Levophed initiated for ongoing hypotension, goal MAP >65  - Cont cefepime as per ID    - am corticsol showed level 6.6 stimed and not consistent with adrenal insufficiency  - Stopped dexamethasone  - bp ok and off pressors      Acute blood loss anemia  Assessment & Plan  No signs of acute bleed  Ortho team  "contacted to evaluate knee and ensure no bleed  One unit of blood  H and H q 8    Type 2 diabetes mellitus (HCC)- (present on admission)  Assessment & Plan  A1c 5.4  Diabetic diet  Stooping dexamethasone so that may help the glucose high  ISS    Septic arthritis of knee, left (HCC)- (present on admission)  Assessment & Plan  Hx left knee replacement with recurrent infections  Knee aspiration 10/24, cultures not viable  Repeat knee aspiration done 10/26, PCR positive for pseudomonas  Explploration and abx spacer placement 10/30/23  Continue cefepime  ID and ortho following, appreciate recommendations    Hyponatremia- (present on admission)  Assessment & Plan  Acute  Unclear as euvolemic  Check repeat    Paroxysmal atrial fibrillation, hx of- (present on admission)  Assessment & Plan  \"Patient with h/o above, not currently on anticoagulation. Per chart review, was on apixaban until about 7/22 during which she was hospitalized for bacteremia and underwent explantation of hardware due to concern for infected joint at that time. Post op she was anemic with Hgb <7 and eliquis was held and never restarted for unclear reason. However patient was eventually started on Multaq. This was also discontinued recently due to patient being found to not have significant A fib burden on evaluation with Cardiology. CHADSVASC 3 (age, htn, dm).  - S/p cardioversion for unstable A fib RVR in PACU x2, remains hypotensive however now sinus vikki\" From Dr. Smith's note      Pt is sinus and vikki at this time  Off lovenox for DVT due to acute blood loss anemia  Need to review long term anticoagulation with patient     Hypertension- (present on admission)  Assessment & Plan  H/o above, home meds are losartan and metoprolol  - Home meds held in setting of septic shock requiring pressor support  - Resume when appropriate as was hypotensive the past 2 days         VTE:  Contraindicated  Ulcer: Not Indicated    I have performed a physical exam " and reviewed and updated ROS and Plan today (11/1/2023). In review of yesterday's note (10/31/2023), there are no changes except as documented above.     Discussed patient condition and risk of morbidity and/or mortality with RN, RT, Pharmacy, Charge nurse / hot rounds, and Patient  The patient remains critically ill.  Critical care time = 32 minutes in directly providing and coordinating critical care and extensive data review.  No time overlap and excludes procedures.

## 2023-11-01 NOTE — PROGRESS NOTES
Hospital Medicine Daily Progress Note    Date of Service  11/1/2023    Chief Complaint  April Alicia is a 63 y.o. female admitted 10/23/2023 with leg lower extremity pain.    Hospital Course  Ms. Alicia is a 63 y.o. female with history of hypertension, diabetes, GERD, A-fib  and COPD and bilateral total knee replacement who presented 10/23/2023 with complaints of left leg pain onset 4 days ago, swelling and redness.  She had fever with Tmax 102.  She has been taking Augmentin from 10/5/2023. Patient found to have left knee joint swelling, s/p aspiration by orthopedic surgery on 10/26/2023. Aspiration culture grew pseudomonas. ID consulted and she was initiated on IV cefepime.  On 10/30/2023 she underwent left total knee arthroplasty with insertion of a spacer and incisional wound vacuum by Dr. Luz orthopedic surgery.  Postoperatively she went into atrial fibrillation with rapid ventricular response and blood pressure went down into the 60s.  Anesthesia cardioverted her twice with return of sinus rhythm.  Despite 3 L of fluid she remained hypotensive and therefore was transferred to the ICU for intravenous pressors.    Interval Problem Update  11/1/2023: Ms. Alicia was evaluated and examined in the ICU. Her Hb today is down to 5.8 she will be transfused 1 unit packed red blood cells.  Blood pressure remains low.    I have discussed this patient's plan of care and discharge plan at IDT rounds today with Case Management, Nursing, Nursing leadership, and other members of the IDT team.    Consultants/Specialty  Critical care  Orthopedic surgery  Infectious disease      Code Status  Full Code    Disposition  The patient is not medically cleared for discharge to home or a post-acute facility.      I have placed the appropriate orders for post-discharge needs.    Review of Systems  Review of Systems   Respiratory:  Negative for shortness of breath.    Cardiovascular:  Negative for chest pain.    Gastrointestinal:  Positive for nausea.   Musculoskeletal:         Left leg brace on   All other systems reviewed and are negative.       Physical Exam  Temp:  [36.3 °C (97.4 °F)-36.8 °C (98.3 °F)] 36.5 °C (97.7 °F)  Pulse:  [49-67] 64  Resp:  [11-58] 17  BP: ()/(45-61) 92/51  SpO2:  [93 %-100 %] 95 %    Physical Exam  Vitals and nursing note reviewed.   Constitutional:       General: She is not in acute distress.     Appearance: She is not toxic-appearing.   HENT:      Mouth/Throat:      Mouth: Mucous membranes are dry.   Cardiovascular:      Rate and Rhythm: Bradycardia present.      Heart sounds: Murmur heard.   Pulmonary:      Effort: Pulmonary effort is normal.      Breath sounds: Normal breath sounds.      Comments: Room air  Abdominal:      General: There is no distension.      Tenderness: There is no abdominal tenderness.   Musculoskeletal:      Comments: Left leg brace on   Neurological:      General: No focal deficit present.      Mental Status: She is alert and oriented to person, place, and time.   Psychiatric:         Mood and Affect: Mood normal.         Behavior: Behavior normal.         Fluids    Intake/Output Summary (Last 24 hours) at 11/1/2023 1131  Last data filed at 11/1/2023 1030  Gross per 24 hour   Intake 4853.66 ml   Output 600 ml   Net 4253.66 ml       Laboratory  Recent Labs     10/30/23  0300 11/01/23  0300 11/01/23  0455   WBC 18.3* 15.7* 16.3*   RBC 2.83* 2.11* 2.05*   HEMOGLOBIN 8.2* 6.0* 5.8*   HEMATOCRIT 25.2* 18.6* 18.2*   MCV 89.0 88.2 88.8   MCH 29.0 28.4 28.3   MCHC 32.5 32.3 31.9*   RDW 48.5 47.4 47.1   PLATELETCT 167 109* 109*   MPV 10.3 9.9 10.0     Recent Labs     10/30/23  0300 11/01/23  0300   SODIUM 135 129*   POTASSIUM 4.2 4.7   CHLORIDE 104 101   CO2 21 19*   GLUCOSE 176* 230*   BUN 16 29*   CREATININE 1.02 1.12   CALCIUM 7.5* 6.9*                   Imaging  IR-PICC LINE PLACEMENT W/ GUIDANCE > AGE 5   Final Result                  Ultrasound-guided PICC  placement performed by qualified nursing staff as    above.          DX-KNEE 2- LEFT   Final Result         1.  No acute traumatic bony injury.   2.  Soft tissue gas in small knee joint effusion are seen.      CT-KNEE W/O LEFT   Final Result         1.  Moderate knee joint effusion, quantity of fluid appears significantly increased since prior study, noncontrast technique limits characterization of fluid.   2.  No acute traumatic bony injury identified.   3.  Slight lucency at the tibial arthroplasty component suggests possible component of hardware loosening.   4.  Atherosclerosis           Assessment/Plan  Septic shock due to Pseudomonas species (HCC)- (present on admission)  Assessment & Plan  Requiring transfer to ICU and intravenous pressors as well as IV fluids    Septic arthritis of knee, left (HCC)- (present on admission)  Assessment & Plan  Concern for cellulitis or septic joint on presentation  Hx left knee replacement with recurrent infections  Ortho consulted  CT shows left knee effusion  Status post left total knee arthroplasty with insertion of a spacer on 10/30/2023 Dr. Luz orthopedic surgery  Sugars are positive for Pseudomonas  IV cefepime    Acute blood loss anemia- (present on admission)  Assessment & Plan  Hb went down to 5.8 for which she was transfused one unit of RBCs  Check Hb in the morning and transfuse for Hb less than 7  Her Hb on admission was 11.9    Type 2 diabetes mellitus (HCC)- (present on admission)  Assessment & Plan  With hyperglycemia for which she is requiring sliding scale insulin  Once she is stabilized out of the ICU then we will consider starting oral medications with goal of transitioning off insulin      Hyponatremia- (present on admission)  Assessment & Plan  Hypovolemic    Paroxysmal atrial fibrillation, hx of- (present on admission)  Assessment & Plan  Noted, not on anticoagulation    Hypertension- (present on admission)  Assessment & Plan  Home blood pressure  medications were held due to hypotension and shock and will be restarted accordingly based on her blood pressure    Thrombocytopenia (HCC)- (present on admission)  Assessment & Plan  This has been a chronic condition for her and we will follow platelet count in the morning.         VTE prophylaxis:    enoxaparin ppx      I have performed a physical exam and reviewed and updated ROS and Plan today (11/1/2023). In review of yesterday's note (10/31/2023), there are no changes except as documented above.

## 2023-11-01 NOTE — ASSESSMENT & PLAN NOTE
No signs of acute bleed  Ortho team contacted to evaluate knee and ensure no bleed  One unit of blood  H and H q 8

## 2023-11-01 NOTE — PROGRESS NOTES
Patient's hemoglobin decreased from 8.2 to 5.8. Blood transfusion performed. Dr. Luz notified and ordered to unwrap the ace bandage to ensure vac underneath not leaking and reapply the bandage. No obvious bleeding or leakage noted from wound or wound vac. MD notified.

## 2023-11-01 NOTE — ASSESSMENT & PLAN NOTE
Hb went down to 5.8 for which she was transfused one unit of RBCs  Check Hb in the morning and transfuse for Hb less than 7  Her Hb on admission was 11.9

## 2023-11-01 NOTE — PROGRESS NOTES
"    Orthopedic PA Progress Note    Interval changes:  Patient doing well postop L knee abx spacer exchange  Transfused today  LLE incisional vac intact and dressings are CDI  WBAT in knee immobilizer at all times  No pending procedures  DVT Prophylaxis outpatient: ASA 81 mg PO BID x4 weeks  Follow up with Dr Luz in 2 weeks for wound check   Cleared for DC from orthopedic standpoint pending therapy recs and medical optimization    ROS - Patient denies any new issues.  Denies any numbness or tingling. Pain well controlled.    /75   Pulse (!) 58   Temp 36.6 °C (97.8 °F) (Temporal)   Resp 18   Ht 1.727 m (5' 7.99\")   Wt 90 kg (198 lb 6.6 oz)   SpO2 97%     Patient seen and examined  No acute distress  Breathing non labored  RRR  LLE: Wound vac in place without leak.  Patient clearly fires tibialis anterior, EHL, and gastrocnemius/soleus. Sensation is intact to light touch throughout superficial peroneal, deep peroneal, tibial, saphenous, and sural nerve distributions. Strong and palpable 2+ dorsalis pedis and posterior tibial pulses with capillary refill less than 2 seconds.     Recent Labs     10/30/23  0300 11/01/23  0300 11/01/23  0455   WBC 18.3* 15.7* 16.3*   RBC 2.83* 2.11* 2.05*   HEMOGLOBIN 8.2* 6.0* 5.8*   HEMATOCRIT 25.2* 18.6* 18.2*   MCV 89.0 88.2 88.8   MCH 29.0 28.4 28.3   MCHC 32.5 32.3 31.9*   RDW 48.5 47.4 47.1   PLATELETCT 167 109* 109*   MPV 10.3 9.9 10.0       Active Hospital Problems    Diagnosis     Hypertension [I10]      Priority: Medium    Thrombocytopenia (HCC) [D69.6]      Priority: Low    Acute blood loss anemia [D62]     Septic shock due to Pseudomonas species (HCC) [A41.52, R65.21]     Type 2 diabetes mellitus (HCC) [E11.9]     Septic arthritis of knee, left (HCC) [M00.9]     Hyponatremia [E87.1]     Paroxysmal atrial fibrillation, hx of [I48.0]        Assessment/Plan:  Patient doing well postop L knee abx spacer exchange  LLE incisional vac intact and dressings are CDI  WBAT " in knee immobilizer at all times  No pending procedures  DVT Prophylaxis outpatient: ASA 81 mg PO BID x4 weeks  Follow up with Dr Luz in 2 weeks for wound check   Cleared for DC from orthopedic standpoint pending therapy recs and medical optimization    POD#2 S/p   1.  left total knee arthroplasty explantation  2. left knee insertion of articulating spacer   3. left knee application of incisional wound vacuum   Wt bearing status - WBAT LLE in knee immobilizer  Wound care/Drains - Dressings to be left in place  Future Procedures - None planned   Lovenox: Start 10/31, Duration-until ambulatory > 150'  Sutures/Staples out- 14-21 days post operatively. Removal will completed by ortho CANDIDO's unless transferred.  DVT Prophylaxis outpatient: ASA 81 mg PO BID x4 weeks  PT/OT-initiated  Antibiotics: Cefepime,  Perioperative completed  DVT Prophylaxis- TEDS/SCDs/Foot pumps  Amaro-not needed per ortho  Case Coordination for Discharge Planning - Disposition per therapy recs.

## 2023-11-01 NOTE — CARE PLAN
The patient is Watcher - Medium risk of patient condition declining or worsening    Shift Goals  Clinical Goals: MAP > 65, wean Levophed, stable hemodynamics  Patient Goals: pain control, eat  Family Goals: unable to assess    Progress made toward(s) clinical / shift goals:    Problem: Pain - Standard  Goal: Alleviation of pain or a reduction in pain to the patient’s comfort goal  Outcome: Progressing     Problem: Knowledge Deficit - Standard  Goal: Patient and family/care givers will demonstrate understanding of plan of care, disease process/condition, diagnostic tests and medications  Outcome: Progressing     Problem: Communication  Goal: The ability to communicate needs accurately and effectively will improve  Outcome: Progressing     Problem: Skin Integrity  Goal: Skin integrity is maintained or improved  Outcome: Progressing     Problem: Hemodynamics  Goal: Patient's hemodynamics, fluid balance and neurologic status will be stable or improve  Outcome: Progressing       Patient is not progressing towards the following goals: N/A    Patient was treated with pharmacologic and nonpharmacologic pain management methods. Levophed was titrated to maintain SBP > 65.

## 2023-11-01 NOTE — PROGRESS NOTES
Report given to receiving RN. Patient transported on telebox with transport tech and CCT. All patient's belongings were left with patient.

## 2023-11-01 NOTE — CARE PLAN
Problem: Pain - Standard  Goal: Alleviation of pain or a reduction in pain to the patient’s comfort goal  Outcome: Progressing     Problem: Knowledge Deficit - Standard  Goal: Patient and family/care givers will demonstrate understanding of plan of care, disease process/condition, diagnostic tests and medications  Outcome: Progressing   The patient is Stable - Low risk of patient condition declining or worsening    Shift Goals  Clinical Goals: MAP >65  Patient Goals: Pain control for left knee  Family Goals: updates    Progress made toward(s) clinical / shift goals:       Patient is not progressing towards the following goals:

## 2023-11-02 LAB
BACTERIA TISS AEROBE CULT: ABNORMAL
BASOPHILS # BLD AUTO: 0.1 % (ref 0–1.8)
BASOPHILS # BLD: 0.01 K/UL (ref 0–0.12)
EOSINOPHIL # BLD AUTO: 0 K/UL (ref 0–0.51)
EOSINOPHIL NFR BLD: 0 % (ref 0–6.9)
ERYTHROCYTE [DISTWIDTH] IN BLOOD BY AUTOMATED COUNT: 48 FL (ref 35.9–50)
GLUCOSE BLD STRIP.AUTO-MCNC: 179 MG/DL (ref 65–99)
GLUCOSE BLD STRIP.AUTO-MCNC: 184 MG/DL (ref 65–99)
GLUCOSE BLD STRIP.AUTO-MCNC: 189 MG/DL (ref 65–99)
GLUCOSE BLD STRIP.AUTO-MCNC: 220 MG/DL (ref 65–99)
GRAM STN SPEC: ABNORMAL
GRAM STN SPEC: ABNORMAL
HCT VFR BLD AUTO: 22.8 % (ref 37–47)
HGB BLD-MCNC: 7.6 G/DL (ref 12–16)
IMM GRANULOCYTES # BLD AUTO: 0.12 K/UL (ref 0–0.11)
IMM GRANULOCYTES NFR BLD AUTO: 1.1 % (ref 0–0.9)
LYMPHOCYTES # BLD AUTO: 1.07 K/UL (ref 1–4.8)
LYMPHOCYTES NFR BLD: 9.4 % (ref 22–41)
MCH RBC QN AUTO: 29.3 PG (ref 27–33)
MCHC RBC AUTO-ENTMCNC: 33.3 G/DL (ref 32.2–35.5)
MCV RBC AUTO: 88 FL (ref 81.4–97.8)
MONOCYTES # BLD AUTO: 0.54 K/UL (ref 0–0.85)
MONOCYTES NFR BLD AUTO: 4.7 % (ref 0–13.4)
NEUTROPHILS # BLD AUTO: 9.67 K/UL (ref 1.82–7.42)
NEUTROPHILS NFR BLD: 84.7 % (ref 44–72)
NRBC # BLD AUTO: 0 K/UL
NRBC BLD-RTO: 0 /100 WBC (ref 0–0.2)
PLATELET # BLD AUTO: 96 K/UL (ref 164–446)
PLATELETS.RETICULATED NFR BLD AUTO: 1.9 % (ref 0.6–13.1)
PMV BLD AUTO: 10.8 FL (ref 9–12.9)
RBC # BLD AUTO: 2.59 M/UL (ref 4.2–5.4)
SIGNIFICANT IND 70042: ABNORMAL
SIGNIFICANT IND 70042: ABNORMAL
SITE SITE: ABNORMAL
SITE SITE: ABNORMAL
SOURCE SOURCE: ABNORMAL
SOURCE SOURCE: ABNORMAL
WBC # BLD AUTO: 11.4 K/UL (ref 4.8–10.8)

## 2023-11-02 PROCEDURE — 700102 HCHG RX REV CODE 250 W/ 637 OVERRIDE(OP): Performed by: INTERNAL MEDICINE

## 2023-11-02 PROCEDURE — 700105 HCHG RX REV CODE 258: Performed by: INTERNAL MEDICINE

## 2023-11-02 PROCEDURE — 700111 HCHG RX REV CODE 636 W/ 250 OVERRIDE (IP): Mod: JZ | Performed by: INTERNAL MEDICINE

## 2023-11-02 PROCEDURE — 85055 RETICULATED PLATELET ASSAY: CPT

## 2023-11-02 PROCEDURE — 99233 SBSQ HOSP IP/OBS HIGH 50: CPT | Performed by: STUDENT IN AN ORGANIZED HEALTH CARE EDUCATION/TRAINING PROGRAM

## 2023-11-02 PROCEDURE — A9270 NON-COVERED ITEM OR SERVICE: HCPCS | Performed by: INTERNAL MEDICINE

## 2023-11-02 PROCEDURE — 82962 GLUCOSE BLOOD TEST: CPT | Mod: 91

## 2023-11-02 PROCEDURE — 85025 COMPLETE CBC W/AUTO DIFF WBC: CPT

## 2023-11-02 PROCEDURE — 770020 HCHG ROOM/CARE - TELE (206)

## 2023-11-02 PROCEDURE — 97162 PT EVAL MOD COMPLEX 30 MIN: CPT

## 2023-11-02 RX ADMIN — METOPROLOL SUCCINATE 25 MG: 25 TABLET, EXTENDED RELEASE ORAL at 22:00

## 2023-11-02 RX ADMIN — OXYCODONE HYDROCHLORIDE 10 MG: 10 TABLET ORAL at 11:42

## 2023-11-02 RX ADMIN — POTASSIUM CHLORIDE 10 MEQ: 20 TABLET, EXTENDED RELEASE ORAL at 04:20

## 2023-11-02 RX ADMIN — TIOTROPIUM BROMIDE INHALATION SPRAY 5 MCG: 3.12 SPRAY, METERED RESPIRATORY (INHALATION) at 04:24

## 2023-11-02 RX ADMIN — INSULIN HUMAN 1 UNITS: 100 INJECTION, SOLUTION PARENTERAL at 13:26

## 2023-11-02 RX ADMIN — INSULIN HUMAN 2 UNITS: 100 INJECTION, SOLUTION PARENTERAL at 08:08

## 2023-11-02 RX ADMIN — INSULIN HUMAN 1 UNITS: 100 INJECTION, SOLUTION PARENTERAL at 18:50

## 2023-11-02 RX ADMIN — INSULIN HUMAN 1 UNITS: 100 INJECTION, SOLUTION PARENTERAL at 22:00

## 2023-11-02 RX ADMIN — OMEPRAZOLE 20 MG: 20 CAPSULE, DELAYED RELEASE ORAL at 04:21

## 2023-11-02 RX ADMIN — OXYCODONE HYDROCHLORIDE 10 MG: 10 TABLET ORAL at 21:57

## 2023-11-02 RX ADMIN — ATORVASTATIN CALCIUM 20 MG: 20 TABLET, FILM COATED ORAL at 21:57

## 2023-11-02 RX ADMIN — CEFEPIME 2 G: 2 INJECTION, POWDER, FOR SOLUTION INTRAVENOUS at 04:32

## 2023-11-02 RX ADMIN — CEFEPIME 2 G: 2 INJECTION, POWDER, FOR SOLUTION INTRAVENOUS at 13:31

## 2023-11-02 RX ADMIN — OXYCODONE HYDROCHLORIDE 10 MG: 10 TABLET ORAL at 03:35

## 2023-11-02 RX ADMIN — CEFEPIME 2 G: 2 INJECTION, POWDER, FOR SOLUTION INTRAVENOUS at 22:02

## 2023-11-02 RX ADMIN — OXYCODONE HYDROCHLORIDE 10 MG: 10 TABLET ORAL at 15:45

## 2023-11-02 RX ADMIN — OXYCODONE HYDROCHLORIDE 10 MG: 10 TABLET ORAL at 18:49

## 2023-11-02 RX ADMIN — LOSARTAN POTASSIUM 50 MG: 50 TABLET, FILM COATED ORAL at 04:20

## 2023-11-02 RX ADMIN — OXYCODONE HYDROCHLORIDE 10 MG: 10 TABLET ORAL at 06:45

## 2023-11-02 RX ADMIN — FUROSEMIDE 20 MG: 40 TABLET ORAL at 04:21

## 2023-11-02 ASSESSMENT — COGNITIVE AND FUNCTIONAL STATUS - GENERAL
MOVING FROM LYING ON BACK TO SITTING ON SIDE OF FLAT BED: A LOT
DRESSING REGULAR UPPER BODY CLOTHING: A LITTLE
STANDING UP FROM CHAIR USING ARMS: A LOT
TURNING FROM BACK TO SIDE WHILE IN FLAT BAD: A LOT
MOVING TO AND FROM BED TO CHAIR: A LOT
CLIMB 3 TO 5 STEPS WITH RAILING: A LITTLE
WALKING IN HOSPITAL ROOM: A LOT
SUGGESTED CMS G CODE MODIFIER DAILY ACTIVITY: CK
SUGGESTED CMS G CODE MODIFIER MOBILITY: CJ
MOBILITY SCORE: 22
MOBILITY SCORE: 12
SUGGESTED CMS G CODE MODIFIER MOBILITY: CL
TURNING FROM BACK TO SIDE WHILE IN FLAT BAD: A LITTLE
DAILY ACTIVITIY SCORE: 19
HELP NEEDED FOR BATHING: A LITTLE
TOILETING: A LITTLE
DRESSING REGULAR LOWER BODY CLOTHING: A LOT
CLIMB 3 TO 5 STEPS WITH RAILING: A LOT

## 2023-11-02 ASSESSMENT — FIBROSIS 4 INDEX: FIB4 SCORE: 3.17

## 2023-11-02 ASSESSMENT — GAIT ASSESSMENTS
GAIT LEVEL OF ASSIST: SUPERVISED
DISTANCE (FEET): 150
ASSISTIVE DEVICE: FRONT WHEEL WALKER

## 2023-11-02 ASSESSMENT — PAIN DESCRIPTION - PAIN TYPE
TYPE: ACUTE PAIN

## 2023-11-02 NOTE — CARE PLAN
The patient is Stable - Low risk of patient condition declining or worsening    Shift Goals  Clinical Goals: hemodynamic stability  Patient Goals: rest, pain management  Family Goals: gisele    Progress made toward(s) clinical / shift goals:  Yes    Patient is not progressing towards the following goals:

## 2023-11-02 NOTE — PROGRESS NOTES
4 Eyes Skin Assessment Completed by Sophia RN and DANYEL Erickson.    Head WDL  Ears WDL  Nose WDL  Mouth WDL  Neck WDL  Breast/Chest WDL  Shoulder Blades WDL  Spine WDL  (R) Arm/Elbow/Hand WDL  (L) Arm/Elbow/Hand WDL  Abdomen WDL  Groin WDL  Scrotum/Coccyx/Buttocks WDL  (R) Leg WDL  (L) Leg Edema  (R) Heel/Foot/Toe Edema  (L) Heel/Foot/Toe WDL          Devices In Places Tele Box, Blood Pressure Cuff, Pulse Ox, and Leg Immobilizer      Interventions In Place Sacral Mepilex, Pillows, and Heels Loaded W/Pillows    Possible Skin Injury No    Pictures Uploaded Into Epic N/A  Wound Consult Placed N/A  RN Wound Prevention Protocol Ordered No

## 2023-11-02 NOTE — CARE PLAN
The patient is Watcher - Medium risk of patient condition declining or worsening    Shift Goals  Clinical Goals: hemodynamic stability, Monitor Hg levels  Patient Goals: rest, pain management  Family Goals: gisele    Progress made toward(s) clinical / shift goals:  yes    Patient is not progressing towards the following goals:      Problem: Pain - Standard  Goal: Alleviation of pain or a reduction in pain to the patient’s comfort goal  Outcome: Progressing     Problem: Knowledge Deficit - Standard  Goal: Patient and family/care givers will demonstrate understanding of plan of care, disease process/condition, diagnostic tests and medications  Outcome: Progressing     Problem: Fall Risk  Goal: Patient will remain free from falls  Outcome: Progressing     Problem: Communication  Goal: The ability to communicate needs accurately and effectively will improve  Outcome: Progressing

## 2023-11-02 NOTE — THERAPY
Physical Therapy   Initial Evaluation     Patient Name: April Alicia  Age:  63 y.o., Sex:  female  Medical Record #: [id number]  Today's Date: 11/2/2023     Precautions  Precautions: Weight Bearing As Tolerated Left Lower Extremity    Assessment  Patient is 63 y.o. female admitted w/ c/o left knee pain.  Hx of bilateral TKA's.  Hx of asthma, clotting d/o, DM, emphysema, heart valve dz, liver dz, PN, sz d/o.  She is s/p I&D w/ a spacer to her left knee, WBAT.  She reports living w/ her adult daughter and adult granddaughter, one of whom is available t/o the day.  She lives in a single story house w/ two steps to enter.  Today, she is able to move in/out of bed w/o assist and w/o use of bed features.  She is able to stand and ambulate 150 ft w/ a fww, w/o loss of balance or need of assist.  She is able to perform stairs, also w/o assist, but does use the rails, which she does not have at home, but her daughter/granddaughter can assist.  She expresses a strong desire to d/c home and have out patient PT at the [facility], which as long as she has transportation, sound like a reasonable d/c plan.  Recommend oob/amb prn w/ nsg and fww.  PT for d/c needs  Plan    Physical Therapy Initial Treatment Plan   Duration: Discharge Needs Only    DC Equipment Recommendations: None  Discharge Recommendations: Recommend outpatient physical therapy services to address higher level deficits          Objective       11/02/23 1544   Prior Living Situation   Housing / Facility 1 Story House   Steps Into Home 2   Steps In Home 0   Rail None   Equipment Owned Front-Wheel Walker;Single Point Cane   Lives with - Patient's Self Care Capacity Adult Children   Comments reports someone home to assist 24/7   Prior Level of Functional Mobility   Bed Mobility Independent   Transfer Status Independent   Ambulation Independent   Assistive Devices Used Single Point Cane   Stairs Independent   Cognition    Level of Consciousness Alert   Balance  Assessment   Sitting Balance (Static) Fair +   Sitting Balance (Dynamic) Fair +   Standing Balance (Static) Fair   Standing Balance (Dynamic) Fair   Weight Shift Sitting Good   Weight Shift Standing Good   Comments w/ fww   Bed Mobility    Supine to Sit Supervised   Sit to Supine Supervised   Gait Analysis   Gait Level Of Assist Supervised   Assistive Device Front Wheel Walker   Distance (Feet) 150   # of Stairs Climbed 5   Level of Assist with Stairs Supervised   Weight Bearing Status WBAT   Functional Mobility   Sit to Stand Supervised   Patient / Family Goals    Patient / Family Goal #1 Home w/ out patient PT at the [facility]   Physical Therapy Initial Treatment Plan    Duration Discharge Needs Only   Anticipated Discharge Equipment and Recommendations   DC Equipment Recommendations None   Discharge Recommendations Recommend outpatient physical therapy services to address higher level deficits

## 2023-11-03 VITALS
HEART RATE: 72 BPM | WEIGHT: 201.5 LBS | OXYGEN SATURATION: 99 % | DIASTOLIC BLOOD PRESSURE: 70 MMHG | HEIGHT: 68 IN | SYSTOLIC BLOOD PRESSURE: 101 MMHG | RESPIRATION RATE: 16 BRPM | TEMPERATURE: 98.4 F | BODY MASS INDEX: 30.54 KG/M2

## 2023-11-03 LAB
ALBUMIN SERPL BCP-MCNC: 2.4 G/DL (ref 3.2–4.9)
ALBUMIN/GLOB SERPL: 0.8 G/DL
ALP SERPL-CCNC: 151 U/L (ref 30–99)
ALT SERPL-CCNC: 15 U/L (ref 2–50)
ANION GAP SERPL CALC-SCNC: 5 MMOL/L (ref 7–16)
AST SERPL-CCNC: 20 U/L (ref 12–45)
BACTERIA SPEC ANAEROBE CULT: NORMAL
BACTERIA TISS AEROBE CULT: ABNORMAL
BACTERIA TISS AEROBE CULT: ABNORMAL
BASOPHILS # BLD AUTO: 0.1 % (ref 0–1.8)
BASOPHILS # BLD: 0.01 K/UL (ref 0–0.12)
BILIRUB SERPL-MCNC: 0.7 MG/DL (ref 0.1–1.5)
BUN SERPL-MCNC: 29 MG/DL (ref 8–22)
CALCIUM ALBUM COR SERPL-MCNC: 8.8 MG/DL (ref 8.5–10.5)
CALCIUM SERPL-MCNC: 7.5 MG/DL (ref 8.5–10.5)
CHLORIDE SERPL-SCNC: 106 MMOL/L (ref 96–112)
CO2 SERPL-SCNC: 21 MMOL/L (ref 20–33)
CREAT SERPL-MCNC: 0.88 MG/DL (ref 0.5–1.4)
EOSINOPHIL # BLD AUTO: 0.09 K/UL (ref 0–0.51)
EOSINOPHIL NFR BLD: 0.9 % (ref 0–6.9)
ERYTHROCYTE [DISTWIDTH] IN BLOOD BY AUTOMATED COUNT: 48.1 FL (ref 35.9–50)
GFR SERPLBLD CREATININE-BSD FMLA CKD-EPI: 74 ML/MIN/1.73 M 2
GLOBULIN SER CALC-MCNC: 2.9 G/DL (ref 1.9–3.5)
GLUCOSE BLD STRIP.AUTO-MCNC: 146 MG/DL (ref 65–99)
GLUCOSE BLD STRIP.AUTO-MCNC: 186 MG/DL (ref 65–99)
GLUCOSE SERPL-MCNC: 173 MG/DL (ref 65–99)
GRAM STN SPEC: ABNORMAL
HCT VFR BLD AUTO: 27.4 % (ref 37–47)
HGB BLD-MCNC: 9 G/DL (ref 12–16)
IMM GRANULOCYTES # BLD AUTO: 0.14 K/UL (ref 0–0.11)
IMM GRANULOCYTES NFR BLD AUTO: 1.3 % (ref 0–0.9)
LYMPHOCYTES # BLD AUTO: 1.76 K/UL (ref 1–4.8)
LYMPHOCYTES NFR BLD: 16.8 % (ref 22–41)
MAGNESIUM SERPL-MCNC: 2 MG/DL (ref 1.5–2.5)
MCH RBC QN AUTO: 29 PG (ref 27–33)
MCHC RBC AUTO-ENTMCNC: 32.8 G/DL (ref 32.2–35.5)
MCV RBC AUTO: 88.4 FL (ref 81.4–97.8)
MONOCYTES # BLD AUTO: 0.72 K/UL (ref 0–0.85)
MONOCYTES NFR BLD AUTO: 6.9 % (ref 0–13.4)
NEUTROPHILS # BLD AUTO: 7.75 K/UL (ref 1.82–7.42)
NEUTROPHILS NFR BLD: 74 % (ref 44–72)
NRBC # BLD AUTO: 0.02 K/UL
NRBC BLD-RTO: 0.2 /100 WBC (ref 0–0.2)
PHOSPHATE SERPL-MCNC: 2 MG/DL (ref 2.5–4.5)
PLATELET # BLD AUTO: 122 K/UL (ref 164–446)
PMV BLD AUTO: 10.2 FL (ref 9–12.9)
POTASSIUM SERPL-SCNC: 4.4 MMOL/L (ref 3.6–5.5)
PROT SERPL-MCNC: 5.3 G/DL (ref 6–8.2)
RBC # BLD AUTO: 3.1 M/UL (ref 4.2–5.4)
SIGNIFICANT IND 70042: ABNORMAL
SIGNIFICANT IND 70042: NORMAL
SITE SITE: ABNORMAL
SITE SITE: NORMAL
SODIUM SERPL-SCNC: 132 MMOL/L (ref 135–145)
SOURCE SOURCE: ABNORMAL
SOURCE SOURCE: NORMAL
WBC # BLD AUTO: 10.5 K/UL (ref 4.8–10.8)

## 2023-11-03 PROCEDURE — 700102 HCHG RX REV CODE 250 W/ 637 OVERRIDE(OP): Performed by: INTERNAL MEDICINE

## 2023-11-03 PROCEDURE — 700111 HCHG RX REV CODE 636 W/ 250 OVERRIDE (IP): Mod: JZ | Performed by: INTERNAL MEDICINE

## 2023-11-03 PROCEDURE — 83735 ASSAY OF MAGNESIUM: CPT

## 2023-11-03 PROCEDURE — 80053 COMPREHEN METABOLIC PANEL: CPT

## 2023-11-03 PROCEDURE — 85025 COMPLETE CBC W/AUTO DIFF WBC: CPT

## 2023-11-03 PROCEDURE — 99239 HOSP IP/OBS DSCHRG MGMT >30: CPT | Performed by: STUDENT IN AN ORGANIZED HEALTH CARE EDUCATION/TRAINING PROGRAM

## 2023-11-03 PROCEDURE — 82962 GLUCOSE BLOOD TEST: CPT

## 2023-11-03 PROCEDURE — 84100 ASSAY OF PHOSPHORUS: CPT

## 2023-11-03 PROCEDURE — A9270 NON-COVERED ITEM OR SERVICE: HCPCS | Performed by: INTERNAL MEDICINE

## 2023-11-03 PROCEDURE — 700105 HCHG RX REV CODE 258: Performed by: INTERNAL MEDICINE

## 2023-11-03 RX ORDER — OXYCODONE HYDROCHLORIDE 5 MG/1
5 TABLET ORAL
Qty: 24 TABLET | Refills: 0 | Status: SHIPPED | OUTPATIENT
Start: 2023-11-03 | End: 2023-11-06

## 2023-11-03 RX ORDER — POTASSIUM CHLORIDE 750 MG/1
10 TABLET, EXTENDED RELEASE ORAL DAILY
Qty: 60 TABLET | Refills: 11 | Status: SHIPPED | OUTPATIENT
Start: 2023-11-04 | End: 2024-01-02

## 2023-11-03 RX ORDER — OXYCODONE HYDROCHLORIDE 5 MG/1
5 TABLET ORAL
Qty: 24 TABLET | Refills: 0 | Status: SHIPPED
Start: 2023-11-03 | End: 2023-11-03 | Stop reason: SDUPTHER

## 2023-11-03 RX ORDER — ASPIRIN 81 MG/1
81 TABLET ORAL DAILY
Qty: 30 TABLET | Refills: 0 | Status: SHIPPED | OUTPATIENT
Start: 2023-11-03 | End: 2023-12-03

## 2023-11-03 RX ADMIN — OXYCODONE HYDROCHLORIDE 10 MG: 10 TABLET ORAL at 08:46

## 2023-11-03 RX ADMIN — OXYCODONE HYDROCHLORIDE 10 MG: 10 TABLET ORAL at 16:00

## 2023-11-03 RX ADMIN — DOCUSATE SODIUM 50 MG AND SENNOSIDES 8.6 MG 2 TABLET: 8.6; 5 TABLET, FILM COATED ORAL at 04:36

## 2023-11-03 RX ADMIN — OMEPRAZOLE 20 MG: 20 CAPSULE, DELAYED RELEASE ORAL at 04:37

## 2023-11-03 RX ADMIN — CEFEPIME 2 G: 2 INJECTION, POWDER, FOR SOLUTION INTRAVENOUS at 04:38

## 2023-11-03 RX ADMIN — FUROSEMIDE 20 MG: 40 TABLET ORAL at 04:37

## 2023-11-03 RX ADMIN — POTASSIUM CHLORIDE 10 MEQ: 20 TABLET, EXTENDED RELEASE ORAL at 04:36

## 2023-11-03 RX ADMIN — OXYCODONE HYDROCHLORIDE 10 MG: 10 TABLET ORAL at 13:02

## 2023-11-03 RX ADMIN — TIOTROPIUM BROMIDE INHALATION SPRAY 5 MCG: 3.12 SPRAY, METERED RESPIRATORY (INHALATION) at 04:36

## 2023-11-03 RX ADMIN — LOSARTAN POTASSIUM 50 MG: 50 TABLET, FILM COATED ORAL at 04:37

## 2023-11-03 RX ADMIN — OXYCODONE HYDROCHLORIDE 10 MG: 10 TABLET ORAL at 04:57

## 2023-11-03 RX ADMIN — CEFEPIME 2 G: 2 INJECTION, POWDER, FOR SOLUTION INTRAVENOUS at 12:58

## 2023-11-03 ASSESSMENT — PAIN DESCRIPTION - PAIN TYPE
TYPE: ACUTE PAIN
TYPE: ACUTE PAIN

## 2023-11-03 ASSESSMENT — FIBROSIS 4 INDEX: FIB4 SCORE: 2.67

## 2023-11-03 NOTE — HOSPITAL COURSE
"From Dr. Gurrola's note: \"Ms. Alicia is a 63 y.o. female with history of hypertension, diabetes, GERD, A-fib  and COPD and bilateral total knee replacement who presented 10/23/2023 with complaints of left leg pain onset 4 days ago, swelling and redness.  She had fever with Tmax 102.  She has been taking Augmentin from 10/5/2023. Patient found to have left knee joint swelling, s/p aspiration by orthopedic surgery on 10/26/2023. Aspiration culture grew pseudomonas. ID consulted and she was initiated on IV cefepime.  On 10/30/2023 she underwent left total knee arthroplasty with insertion of a spacer and incisional wound vacuum by Dr. Luz orthopedic surgery.  Postoperatively she went into atrial fibrillation with rapid ventricular response and blood pressure went down into the 60s.  Anesthesia cardioverted her twice with return of sinus rhythm.  Despite 3 L of fluid she remained hypotensive and therefore was transferred to the ICU for intravenous pressors.    11/1/2023: Ms. Alicia was evaluated and examined in the ICU. Her Hb today is down to 5.8 she will be transfused 1 unit packed red blood cells.  Blood pressure remains low.\"  "

## 2023-11-03 NOTE — THERAPY
Occupational Therapy Contact Note    Patient Name: April Alicia  Age:  63 y.o., Sex:  female  Medical Record #: [id number]  Today's Date: 11/3/2023    OT consult rec'd. Pt already has d/c orders in chart and will be d/cing to [facility] this PM. Will hold OT eval, but continue to follow in case of change in POC.

## 2023-11-03 NOTE — DISCHARGE SUMMARY
"Discharge Summary    CHIEF COMPLAINT ON ADMISSION  Chief Complaint   Patient presents with    Leg Pain     Left knee swelling x1 week        Reason for Admission  Abd Pain    Admission Date  10/23/2023   Discharge date 11/3/23  CODE STATUS  Full Code    HPI & HOSPITAL COURSE  From Dr. Gurrola's note: \"Ms. Alicia is a 63 y.o. female with history of hypertension, diabetes, GERD, A-fib  and COPD and bilateral total knee replacement who presented 10/23/2023 with complaints of left leg pain onset 4 days ago, swelling and redness.  She had fever with Tmax 102.  She has been taking Augmentin from 10/5/2023. Patient found to have left knee joint swelling, s/p aspiration by orthopedic surgery on 10/26/2023. Aspiration culture grew pseudomonas. ID consulted and she was initiated on IV cefepime.  On 10/30/2023 she underwent left total knee arthroplasty with insertion of a spacer and incisional wound vacuum by Dr. Luz orthopedic surgery.  Postoperatively she went into atrial fibrillation with rapid ventricular response and blood pressure went down into the 60s.  Anesthesia cardioverted her twice with return of sinus rhythm.  Despite 3 L of fluid she remained hypotensive and therefore was transferred to the ICU for intravenous pressors.    11/1/2023: Ms. Alicia was evaluated and examined in the ICU. Her Hb today is down to 5.8 she will be transfused 1 unit packed red blood cells.  Blood pressure remains low.\"    11/2/23  Doing well post op, improved knee pain but still present. Tolerating PO intake. Will go to SNF for rehab and iv abx    Recs from ID  --- Continue cefepime 2 g every 8 hours, patient will need a 6-week IV antibiotic course from date of surgery (12/11/23 ) and then additional antibiotics will depend on surgical plans-if two-stage is planned would stop after the 6-week course and monitor for at least 2 weeks.  Recommend aspiration of the knee and cultures prior to second stage given history of recurrent " infections.  NO further surgeries planned according to ortho documentation.      Therefore, she is discharged in fair and stable condition to skilled nursing facility.    The patient met 2-midnight criteria for an inpatient stay at the time of discharge.      FOLLOW UP ITEMS POST DISCHARGE  Orthopedics, PCP    DISCHARGE DIAGNOSES  Principal Problem (Resolved):    Knee pain (POA: Yes)  Active Problems:    Thrombocytopenia (HCC) (POA: Yes)    Hypertension (Chronic) (POA: Yes)    Paroxysmal atrial fibrillation, hx of (POA: Yes)    Hyponatremia (POA: Yes)    Septic arthritis of knee, left (HCC) (POA: Yes)    Type 2 diabetes mellitus (HCC) (POA: Yes)    Septic shock due to Pseudomonas species (HCC) (POA: Yes)    Acute blood loss anemia (POA: Yes)      FOLLOW UP  Future Appointments   Date Time Provider Department Center   12/5/2023 10:30 AM DANYELL Baez None     [facility]  [address]  [place] [address]  [phone]          MEDICATIONS ON DISCHARGE     Medication List        START taking these medications        Instructions   aspirin 81 MG EC tablet   Take 1 Tablet by mouth every day for 30 days.  Dose: 81 mg     NS SOLN 100 mL with cefepime 2 GM SOLR 2 g   Infuse 2 g into a venous catheter every 8 hours for 38 days.  Dose: 2 g     oxyCODONE immediate-release 5 MG Tabs  Commonly known as: Roxicodone   Take 1 Tablet by mouth every 3 hours as needed for Severe Pain for up to 3 days.  Dose: 5 mg     potassium chloride SA 10 MEQ Tbcr  Start taking on: November 4, 2023  Commonly known as: K-Dur   Take 1 Tablet by mouth every day.  Dose: 10 mEq            CONTINUE taking these medications        Instructions   atorvastatin 20 MG Tabs  Commonly known as: Lipitor   Take 1 Tablet by mouth every evening.  Dose: 20 mg     bacitracin 500 UNIT/GM ointment      calcium/vitamin D 500-200 MG-UNIT Tabs   1 twice a day by oral route.     ferrous sulfate 325 (65 Fe) MG tablet   Take 325 mg by mouth every  day.  Dose: 325 mg     furosemide 20 MG Tabs  Commonly known as: Lasix   Take 1 Tablet by mouth every day.  Dose: 20 mg     lactulose 20 GM/30ML Soln   Take 15 mL by mouth every day.  Dose: 15 mL     losartan 100 MG Tabs  Commonly known as: Cozaar      magnesium oxide 400 MG Tabs tablet  Commonly known as: Mag-Ox   Take 400 mg by mouth every day.  Dose: 400 mg     metoprolol SR 25 MG Tb24  Commonly known as: Toprol XL   Take 1 Tablet by mouth every evening.  Dose: 25 mg     pantoprazole 20 MG tablet  Commonly known as: Protonix   Take 20 mg by mouth every day.  Dose: 20 mg     potassium chloride 10 MEQ capsule  Commonly known as: Micro-K   Take 1 Capsule by mouth every day.  Dose: 10 mEq     Refresh Tears 0.5 % Soln  Generic drug: carboxymethylcellulose   Administer 2 Drops into both eyes as needed (dry eyes).  Dose: 2 Drop     senna-docusate 8.6-50 MG Tabs  Commonly known as: Pericolace Or Senokot S   Take 2 Tablets by mouth 2 times a day.  Dose: 2 Tablet     Sensorcaine-MPF/EPINEPHrine 0.5% -[id number] Soln  Generic drug: bupivacaine-0.5%-epinephrine [id number] PF      sodium bicarbonate 650 MG Tabs  Commonly known as: Sodium Bicarbonate   Take 2 Tablets by mouth 2 times a day.  Dose: 1,300 mg     Spiriva HandiHaler 18 MCG Caps  Generic drug: tiotropium   Place 1 Capsule into inhaler and inhale every day.  Dose: 1 Capsule     Vitamin D3 50 MCG (2000 UT) Tabs   Take 2,000 Units by mouth every day.  Dose: 2,000 Units     zinc sulfate 220 (50 Zn) MG Caps  Commonly known as: Zincate   Take 1 Capsule by mouth every day.  Dose: 220 mg            STOP taking these medications      amoxicillin-clavulanate 875-125 MG Tabs  Commonly known as: Augmentin              Allergies  Allergies   Allergen Reactions    Nkda [No Known Drug Allergy]        DIET  Orders Placed This Encounter   Procedures    Diet Order Diet: Consistent CHO (Diabetic)     Standing Status:   Standing     Number of Occurrences:   1     Order Specific Question:    Diet:     Answer:   Consistent CHO (Diabetic) [4]       ACTIVITY  As tolerated.  Weight bearing as tolerated    LINES, DRAINS, AND WOUNDS  This is an automated list. Peripheral IVs will be removed prior to discharge.  Peripheral IV 10/28/23 20 G Anterior;Right Upper arm (Active)   Site Assessment Clean;Intact;Dry 11/03/23 0900   Dressing Type Transparent 11/03/23 0900   Line Status Flushed;Scrubbed the hub prior to access 11/03/23 0900   Dressing Status Clean;Dry;Intact 11/03/23 0900   Dressing Intervention N/A 11/03/23 0900   Dressing Change Due 11/04/23 11/03/23 0900   Infiltration Grading ([facility], [facility]) 0 11/03/23 0900   Phlebitis Scale ([facility] Only) 0 11/03/23 0900       PICC Single Lumen 10/31/23 Left Basilic (Active)   Site Assessment Clean;Dry;Intact 11/03/23 0900   Line Status Scrubbed the hub prior to access;Flushed 11/03/23 0900   Line Secured at (cm) 1 cm 10/31/23 1206   Extremity Circumference (cm) 33 cm 10/31/23 1206   Dressing Type Transparent;Biopatch;Securing device 11/03/23 0900   Dressing Status Clean;Dry;Intact 11/03/23 0900   Dressing Intervention N/A 11/03/23 0900   Dressing Change Due 11/04/23 11/02/23 2000   Line Necessity Assessed Antibiotic Therapy Greater than 7 Days 11/03/23 0900   $ Single Lumen PICC Charge Single kit used 10/31/23 1206       Wound 08/24/20 Incision Knee Left aquacel, ace bandage (Active)       Wound 09/14/20 Incision Knee Left (Active)       Wound 07/17/22 Incision Arm Left STAPLES, MEPILEX AG (Active)       Wound 07/25/22 Other (comment) Coccyx;Perineum;Labia Bilateral moisture associated breakdown/IAD (Active)       Wound 07/28/22 Incision Knee Anterior;Midline;Mid Left Surgical site (Active)      PICC Single Lumen 10/31/23 Left Basilic (Active)   Site Assessment Clean;Dry;Intact 11/03/23 0900   Line Status Scrubbed the hub prior to access;Flushed 11/03/23 0900   Line Secured at (cm) 1 cm 10/31/23 1206   Extremity Circumference (cm) 33 cm 10/31/23 1206   Dressing Type  Transparent;Biopatch;Securing device 11/03/23 0900   Dressing Status Clean;Dry;Intact 11/03/23 0900   Dressing Intervention N/A 11/03/23 0900   Dressing Change Due 11/04/23 11/02/23 2000   Line Necessity Assessed Antibiotic Therapy Greater than 7 Days 11/03/23 0900   $ Single Lumen PICC Charge Single kit used 10/31/23 1206     Peripheral IV 10/28/23 20 G Anterior;Right Upper arm (Active)   Site Assessment Clean;Intact;Dry 11/03/23 0900   Dressing Type Transparent 11/03/23 0900   Line Status Flushed;Scrubbed the hub prior to access 11/03/23 0900   Dressing Status Clean;Dry;Intact 11/03/23 0900   Dressing Intervention N/A 11/03/23 0900   Dressing Change Due 11/04/23 11/03/23 0900   Infiltration Grading ([facility], [facility]) 0 11/03/23 0900   Phlebitis Scale ([facility] Only) 0 11/03/23 0900               MENTAL STATUS ON TRANSFER      A&O x4       CONSULTATIONS  Ortho, ID, Critical care    PROCEDURES   1.  left total knee arthroplasty explantation  2. left knee insertion of articulating spacer   3. left knee application of incisional wound vacuum     LABORATORY  Lab Results   Component Value Date    SODIUM 132 (L) 11/03/2023    POTASSIUM 4.4 11/03/2023    CHLORIDE 106 11/03/2023    CO2 21 11/03/2023    GLUCOSE 173 (H) 11/03/2023    BUN 29 (H) 11/03/2023    CREATININE 0.88 11/03/2023    CREATININE 0.9 03/12/2009        Lab Results   Component Value Date    WBC 10.5 11/03/2023    HEMOGLOBIN 9.0 (L) 11/03/2023    HEMATOCRIT 27.4 (L) 11/03/2023    PLATELETCT 122 (L) 11/03/2023        Total time of the discharge process exceeds 35 minutes.

## 2023-11-03 NOTE — DISCHARGE PLANNING
April is not requiring 2 of 3 disciplines.  [facility] will no longer follow.  Please reach out to myself with any questions.

## 2023-11-03 NOTE — PROGRESS NOTES
"Hospital Medicine Daily Progress Note    Date of Service  11/2/2023    Chief Complaint  April Alicia is a 63 y.o. female admitted 10/23/2023 with knee pain.    Hospital Course  From Dr. Gurrola's note: \"Ms. Alicia is a 63 y.o. female with history of hypertension, diabetes, GERD, A-fib  and COPD and bilateral total knee replacement who presented 10/23/2023 with complaints of left leg pain onset 4 days ago, swelling and redness.  She had fever with Tmax 102.  She has been taking Augmentin from 10/5/2023. Patient found to have left knee joint swelling, s/p aspiration by orthopedic surgery on 10/26/2023. Aspiration culture grew pseudomonas. ID consulted and she was initiated on IV cefepime.  On 10/30/2023 she underwent left total knee arthroplasty with insertion of a spacer and incisional wound vacuum by Dr. Luz orthopedic surgery.  Postoperatively she went into atrial fibrillation with rapid ventricular response and blood pressure went down into the 60s.  Anesthesia cardioverted her twice with return of sinus rhythm.  Despite 3 L of fluid she remained hypotensive and therefore was transferred to the ICU for intravenous pressors.    11/1/2023: Ms. Alicia was evaluated and examined in the ICU. Her Hb today is down to 5.8 she will be transfused 1 unit packed red blood cells.  Blood pressure remains low.\"    Interval Problem Update  11/2/23  Evaluated in her inpatient room. Afebrile, HR 58-77, -119, RR 14-21, SpO2 96-99% on room air.  Labs reviewed, leukocytosis 11.4 anemia 7.6 up from 6.7, platelets 96, sodium 129 bicarb 19 glucose 230, Ca 6.9 however she has low albumin.  Doing well post op, improved knee pain but still present. Tolerating PO intake. Will need to work with PT/OT to assess mobility/stability and need for rehab.   Following cultures. Continue cefepime for now. Patient commented to PT today she would like to DC home and perform PT outpatient with [facility]. Will discuss with ID " tomorrow for final antibiotic recs since she will likely be going home as cefepime is a q8h infusion and if going for outpatient infusions would need a once daily dosed antibiotic if possible.     I have discussed this patient's plan of care and discharge plan at IDT rounds today with Case Management, Nursing, Nursing leadership, and other members of the IDT team.    Consultants/Specialty  critical care, infectious disease, and orthopedics    Code Status  Full Code    Disposition  The patient is not medically cleared for discharge to home or a post-acute facility.    I have placed the appropriate orders for post-discharge needs.    Review of Systems  ROS   Review of Systems   Respiratory:  Negative for shortness of breath.    Cardiovascular:  Negative for chest pain.   Gastrointestinal:  Positive for nausea.   Musculoskeletal:         Left leg brace on   All other systems reviewed and are negative.    Physical Exam  Temp:  [36.5 °C (97.7 °F)-37.5 °C (99.5 °F)] 36.5 °C (97.7 °F)  Pulse:  [] 68  Resp:  [18-21] 19  BP: (104-117)/(55-88) 113/70  SpO2:  [95 %-100 %] 99 %    Physical Exam  Vitals and nursing note reviewed.   Constitutional:       General: She is not in acute distress.     Appearance: She is not toxic-appearing.   HENT:      Head: Normocephalic and atraumatic.      Nose: Nose normal. No rhinorrhea.      Mouth/Throat:      Mouth: Mucous membranes are moist.      Pharynx: Oropharynx is clear.   Eyes:      General: No scleral icterus.     Extraocular Movements: Extraocular movements intact.      Conjunctiva/sclera: Conjunctivae normal.   Cardiovascular:      Rate and Rhythm: Normal rate and regular rhythm.      Pulses: Normal pulses.      Heart sounds: Murmur heard.   Pulmonary:      Effort: Pulmonary effort is normal. No respiratory distress.      Breath sounds: Normal breath sounds. No wheezing, rhonchi or rales.   Abdominal:      General: There is no distension.      Palpations: Abdomen is soft.       Tenderness: There is no abdominal tenderness. There is no guarding or rebound.   Musculoskeletal:         General: Normal range of motion.      Cervical back: Normal range of motion and neck supple. No rigidity.      Right lower leg: No edema.      Left lower leg: No edema.   Skin:     General: Skin is warm and dry.      Capillary Refill: Capillary refill takes less than 2 seconds.      Findings: No erythema or rash.   Neurological:      General: No focal deficit present.      Mental Status: She is alert.      Cranial Nerves: No cranial nerve deficit.      Motor: No weakness.      Coordination: Coordination normal.   Psychiatric:         Mood and Affect: Mood normal.         Behavior: Behavior normal.         Thought Content: Thought content normal.         Judgment: Judgment normal.       Fluids    Intake/Output Summary (Last 24 hours) at 11/2/2023 1840  Last data filed at 11/2/2023 1600  Gross per 24 hour   Intake 1582.5 ml   Output 3230 ml   Net -1647.5 ml       Laboratory  Recent Labs     11/01/23  0300 11/01/23  0455 11/01/23  2110 11/02/23  0417   WBC 15.7* 16.3*  --  11.4*   RBC 2.11* 2.05*  --  2.59*   HEMOGLOBIN 6.0* 5.8* 6.7* 7.6*   HEMATOCRIT 18.6* 18.2* 20.5* 22.8*   MCV 88.2 88.8  --  88.0   MCH 28.4 28.3  --  29.3   MCHC 32.3 31.9*  --  33.3   RDW 47.4 47.1  --  48.0   PLATELETCT 109* 109*  --  96*   MPV 9.9 10.0  --  10.8     Recent Labs     11/01/23  0300   SODIUM 129*   POTASSIUM 4.7   CHLORIDE 101   CO2 19*   GLUCOSE 230*   BUN 29*   CREATININE 1.12   CALCIUM 6.9*                   Imaging  IR-PICC LINE PLACEMENT W/ GUIDANCE > AGE 5   Final Result                  Ultrasound-guided PICC placement performed by qualified nursing staff as    above.          DX-KNEE 2- LEFT   Final Result         1.  No acute traumatic bony injury.   2.  Soft tissue gas in small knee joint effusion are seen.      CT-KNEE W/O LEFT   Final Result         1.  Moderate knee joint effusion, quantity of fluid appears  significantly increased since prior study, noncontrast technique limits characterization of fluid.   2.  No acute traumatic bony injury identified.   3.  Slight lucency at the tibial arthroplasty component suggests possible component of hardware loosening.   4.  Atherosclerosis           Assessment/Plan  Acute blood loss anemia- (present on admission)  Assessment & Plan  Hb went down to 5.8 for which she was transfused one unit of RBCs  Check Hb in the morning and transfuse for Hb less than 7  Her Hb on admission was 11.9    Septic shock due to Pseudomonas species (HCC)- (present on admission)  Assessment & Plan  Requiring transfer to ICU and intravenous pressors as well as IV fluids    Type 2 diabetes mellitus (HCC)- (present on admission)  Assessment & Plan  With hyperglycemia for which she is requiring sliding scale insulin  Once she is stabilized out of the ICU then we will consider starting oral medications with goal of transitioning off insulin      Septic arthritis of knee, left (HCC)- (present on admission)  Assessment & Plan  Concern for cellulitis or septic joint on presentation  Hx left knee replacement with recurrent infections  Ortho consulted  CT shows left knee effusion  Status post left total knee arthroplasty with insertion of a spacer on 10/30/2023 Dr. Luz orthopedic surgery  Sugars are positive for Pseudomonas  IV cefepime    Hyponatremia- (present on admission)  Assessment & Plan  Hypovolemic    Paroxysmal atrial fibrillation, hx of- (present on admission)  Assessment & Plan  Noted, not on anticoagulation    Hypertension- (present on admission)  Assessment & Plan  Home blood pressure medications were held due to hypotension and shock and will be restarted accordingly based on her blood pressure    Thrombocytopenia (HCC)- (present on admission)  Assessment & Plan  This has been a chronic condition for her and we will follow platelet count in the morning.         VTE prophylaxis:   SCDs/TEDs    enoxaparin ppx      I have performed a physical exam and reviewed and updated ROS and Plan today (11/2/2023). In review of yesterday's note (11/1/2023), there are no changes except as documented above.    Total time spent 51 minutes. I spent greater than 50% of the time for patient care, counseling, and coordination on this date, including unit/floor time, and face-to-face time with the patient as per interval events, my own review of patient's imaging and lab analysis and developing my assessment and plan above.

## 2023-11-03 NOTE — CARE PLAN
Problem: Pain - Standard  Goal: Alleviation of pain or a reduction in pain to the patient’s comfort goal  Description: Target End Date:  Prior to discharge or change in level of care    Document on Vitals flowsheet    1.  Document pain using the appropriate pain scale per order or unit policy  2.  Educate and implement non-pharmacologic comfort measures (i.e. relaxation, distraction, massage, cold/heat therapy, etc.)  3.  Pain management medications as ordered  4.  Reassess pain after pain med administration per policy  5.  If opiods administered assess patient's response to pain medication is appropriate per POSS sedation scale  6.  Follow pain management plan developed in collaboration with patient and interdisciplinary team (including palliative care or pain specialists if applicable)  Outcome: Progressing     Problem: Knowledge Deficit - Standard  Goal: Patient and family/care givers will demonstrate understanding of plan of care, disease process/condition, diagnostic tests and medications  Description: Target End Date:  1-3 days or as soon as patient condition allows    Document in Patient Education    1.  Patient and family/caregiver oriented to unit, equipment, visitation policy and means for communicating concern  2.  Complete/review Learning Assessment  3.  Assess knowledge level of disease process/condition, treatment plan, diagnostic tests and medications  4.  Explain disease process/condition, treatment plan, diagnostic tests and medications  Outcome: Progressing     Problem: Fall Risk  Goal: Patient will remain free from falls  Description: Target End Date:  Prior to discharge or change in level of care    Document interventions on the Juliana Dickey Fall Risk Assessment    1.  Assess for fall risk factors  2.  Implement fall precautions  Outcome: Progressing     Problem: Infection - Standard  Goal: Patient will remain free from infection  Description: Target End Date:  Prior to discharge or change in  level of care    1.  Utilize Standard Precautions at all times to reduce the risk of transmission of microorganisms from both recognized and  unrecognized sources of infection  2.  Infection prevention handouts provided (general/device/diagnosis specific) and documented in Patient Education  3.  Educate patient and family/caregiver on isolation precautions if applicable  Outcome: Progressing   The patient is Stable - Low risk of patient condition declining or worsening    Shift Goals  Clinical Goals: hemodynamic stability  Patient Goals: rest, pain management  Family Goals: gisele    Progress made toward(s) clinical / shift goals:  fall precautions in place, pain management per MAR, IV abx course post follow up review, review discharge education    Patient is not progressing towards the following goals:

## 2023-11-03 NOTE — DISCHARGE PLANNING
DC Transport Scheduled    Received request at: 11/3/2023 at 1124    Transport Company Scheduled:  [employer]  Spoke with Cade at [facility] to schedule transport.  [facility] Trip #: [id number]    Scheduled Date: 11/3/203  Scheduled Time: 2783-8600    [facility]     Notified care team of scheduled transport via Vo[employer].     If there are any changes needed to the DC transportation scheduled, please contact [facility] at ext. [phone] between the hours of 0379-1378 Mon-Fri. If outside those hours, contact the ED Case Manager at ext. [phone].

## 2023-11-03 NOTE — PROGRESS NOTES
Bedside report received by RN and patient care assumed. Tele Box in place, patient is A&O4, resting in bed, and on RA. Patient is tearful but shows no signs of physical distress. Comfort provided. Fall precautions in place and patient educated on use of call light for assistance. Pt updated on POC with no questions or concerns. Hourly monitoring initiated.

## 2023-11-03 NOTE — DISCHARGE PLANNING
0824  The patient is not longer being considered for inpatient rehab. Referrals being sent of to area facilities as patient will require 6 wks of antibiotics per MD. CM will monitor for acceptance.     1007 The patient has been accepted at [facility]. Choice form received from patientCM spoke with admissions liaison Rory who stated that they may have a bed later on this afternoon she will call back to update us on the status of the bed.

## 2023-11-03 NOTE — CARE PLAN
The patient is Stable - Low risk of patient condition declining or worsening    Shift Goals  Clinical Goals: pain management, hemodynamic stability  Patient Goals: go home  Family Goals: EDMUND    Progress made toward(s) clinical / shift goals:  Yes      Patient is not progressing towards the following goals:

## 2023-11-04 LAB
BACTERIA SPEC ANAEROBE CULT: NORMAL
SIGNIFICANT IND 70042: NORMAL
SITE SITE: NORMAL
SOURCE SOURCE: NORMAL

## 2023-11-05 LAB
BACTERIA SPEC ANAEROBE CULT: NORMAL
BACTERIA TISS AEROBE CULT: ABNORMAL
GRAM STN SPEC: ABNORMAL
GRAM STN SPEC: ABNORMAL
SIGNIFICANT IND 70042: ABNORMAL
SIGNIFICANT IND 70042: ABNORMAL
SIGNIFICANT IND 70042: NORMAL
SITE SITE: ABNORMAL
SITE SITE: ABNORMAL
SITE SITE: NORMAL
SOURCE SOURCE: ABNORMAL
SOURCE SOURCE: ABNORMAL
SOURCE SOURCE: NORMAL

## 2023-11-06 ENCOUNTER — HOSPITAL ENCOUNTER (OUTPATIENT)
Facility: MEDICAL CENTER | Age: 63
End: 2023-11-06
Attending: INTERNAL MEDICINE
Payer: MEDICAID

## 2023-11-06 LAB — ERYTHROCYTE [SEDIMENTATION RATE] IN BLOOD BY WESTERGREN METHOD: 8 MM/HOUR (ref 0–25)

## 2023-11-06 PROCEDURE — 85652 RBC SED RATE AUTOMATED: CPT

## 2023-11-09 ENCOUNTER — TELEPHONE (OUTPATIENT)
Dept: INFECTIOUS DISEASES | Facility: MEDICAL CENTER | Age: 63
End: 2023-11-09
Payer: MEDICAID

## 2023-11-09 NOTE — TELEPHONE ENCOUNTER
JOLIE Richards At [facility] to return my call to schedule [facility] appt for patient

## 2023-11-13 ENCOUNTER — HOSPITAL ENCOUNTER (OUTPATIENT)
Facility: MEDICAL CENTER | Age: 63
End: 2023-11-13
Attending: INTERNAL MEDICINE
Payer: MEDICAID

## 2023-11-13 LAB — ERYTHROCYTE [SEDIMENTATION RATE] IN BLOOD BY WESTERGREN METHOD: 35 MM/HOUR (ref 0–25)

## 2023-11-13 PROCEDURE — 85652 RBC SED RATE AUTOMATED: CPT

## 2023-11-20 ENCOUNTER — HOSPITAL ENCOUNTER (OUTPATIENT)
Facility: MEDICAL CENTER | Age: 63
End: 2023-11-20
Attending: INTERNAL MEDICINE
Payer: MEDICAID

## 2023-11-20 LAB — ERYTHROCYTE [SEDIMENTATION RATE] IN BLOOD BY WESTERGREN METHOD: 7 MM/HOUR (ref 0–25)

## 2023-11-20 PROCEDURE — 85652 RBC SED RATE AUTOMATED: CPT

## 2023-11-27 ENCOUNTER — HOSPITAL ENCOUNTER (OUTPATIENT)
Facility: MEDICAL CENTER | Age: 63
End: 2023-11-27
Attending: INTERNAL MEDICINE
Payer: MEDICAID

## 2023-11-27 LAB — ERYTHROCYTE [SEDIMENTATION RATE] IN BLOOD BY WESTERGREN METHOD: 27 MM/HOUR (ref 0–25)

## 2023-11-27 PROCEDURE — 85652 RBC SED RATE AUTOMATED: CPT

## 2023-12-04 ENCOUNTER — HOSPITAL ENCOUNTER (OUTPATIENT)
Facility: MEDICAL CENTER | Age: 63
End: 2023-12-04
Attending: INTERNAL MEDICINE
Payer: MEDICAID

## 2023-12-04 LAB — ERYTHROCYTE [SEDIMENTATION RATE] IN BLOOD BY WESTERGREN METHOD: 17 MM/HOUR (ref 0–25)

## 2023-12-04 PROCEDURE — 85652 RBC SED RATE AUTOMATED: CPT

## 2023-12-04 ASSESSMENT — ENCOUNTER SYMPTOMS
COUGH: 0
CHILLS: 0
NAUSEA: 0
BLURRED VISION: 0
FEVER: 0
DIZZINESS: 0
SPUTUM PRODUCTION: 0
DOUBLE VISION: 0
WEIGHT LOSS: 0
WEAKNESS: 0
ABDOMINAL PAIN: 0
WHEEZING: 0
FOCAL WEAKNESS: 0
BRUISES/BLEEDS EASILY: 0
VOMITING: 0
SHORTNESS OF BREATH: 0
PALPITATIONS: 0
NERVOUS/ANXIOUS: 0
HEADACHES: 0
MYALGIAS: 0

## 2023-12-05 ENCOUNTER — OFFICE VISIT (OUTPATIENT)
Dept: INFECTIOUS DISEASES | Facility: MEDICAL CENTER | Age: 63
End: 2023-12-05
Attending: NURSE PRACTITIONER
Payer: MEDICAID

## 2023-12-05 VITALS
TEMPERATURE: 97.8 F | BODY MASS INDEX: 27.13 KG/M2 | HEIGHT: 68 IN | HEART RATE: 100 BPM | RESPIRATION RATE: 16 BRPM | SYSTOLIC BLOOD PRESSURE: 118 MMHG | OXYGEN SATURATION: 99 % | WEIGHT: 179 LBS | DIASTOLIC BLOOD PRESSURE: 82 MMHG

## 2023-12-05 DIAGNOSIS — T84.59XD INFECTION OF PROSTHETIC KNEE JOINT, SUBSEQUENT ENCOUNTER: ICD-10-CM

## 2023-12-05 DIAGNOSIS — A49.9 POLYMICROBIAL BACTERIAL INFECTION: ICD-10-CM

## 2023-12-05 DIAGNOSIS — E11.9 TYPE 2 DIABETES MELLITUS WITHOUT COMPLICATION, WITHOUT LONG-TERM CURRENT USE OF INSULIN (HCC): ICD-10-CM

## 2023-12-05 DIAGNOSIS — Z96.659 INFECTION OF PROSTHETIC KNEE JOINT, SUBSEQUENT ENCOUNTER: ICD-10-CM

## 2023-12-05 PROCEDURE — 99214 OFFICE O/P EST MOD 30 MIN: CPT | Performed by: NURSE PRACTITIONER

## 2023-12-05 PROCEDURE — 3074F SYST BP LT 130 MM HG: CPT | Performed by: NURSE PRACTITIONER

## 2023-12-05 PROCEDURE — 99212 OFFICE O/P EST SF 10 MIN: CPT | Performed by: NURSE PRACTITIONER

## 2023-12-05 PROCEDURE — 3079F DIAST BP 80-89 MM HG: CPT | Performed by: NURSE PRACTITIONER

## 2023-12-05 ASSESSMENT — ENCOUNTER SYMPTOMS
CONSTIPATION: 0
DIARRHEA: 0

## 2023-12-05 ASSESSMENT — FIBROSIS 4 INDEX: FIB4 SCORE: 2.67

## 2023-12-05 NOTE — PROGRESS NOTES
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 Anaerobes isolated.     Narrative:       Surgery Specimen     CULTURE TISSUE W/ GRM STAIN [[id number]]  (Abnormal) Collected: 12/28/22 1432     Order Status: Completed Specimen: Tissue Updated: 12/31/22 1200       Significant Indicator POS       Source TISS       Site Left femur       Culture Result -       Gram Stain Result Rare WBCs.  No organisms seen.          Culture Result Streptococcus agalactiae (Group B)  Light growth       Narrative:       Surgery Specimen     Anaerobic Culture [[id number]] Collected: 12/28/22 1432     Order Status: Completed Specimen: Tissue Updated: 12/31/22 1200       Significant Indicator NEG       Source TISS       Site Left femur       Culture Result No Anaerobes isolated.     Narrative:       Surgery Specimen     CULTURE TISSUE W/ GRM STAIN [[id number]]  (Abnormal) Collected: 12/28/22 1433     Order Status: Completed Specimen: Tissue Updated: 12/31/22 1159       Significant Indicator POS       Source TISS       Site Left Tibia       Culture Result -       Gram Stain Result Many WBCs.  No organisms seen.          Culture Result Streptococcus agalactiae (Group B)  Light growth       Narrative:       Surgery Specimen     Anaerobic Culture  [[id number]] Collected: 12/28/22 1433     Order Status: Completed Specimen: Tissue Updated: 12/31/22 1159       Significant Indicator NEG       Source TISS       Site Left Tibia       Culture Result No Anaerobes isolated.     Narrative:       Surgery Specimen     CULTURE TISSUE W/ GRM STAIN [[id number]]  (Abnormal) Collected: 12/28/22 1349     Order Status: Completed Specimen: Tissue Updated: 12/31/22 1159       Significant Indicator POS       Source TISS       Site Left Knee Medial Gutter       Culture Result -       Gram Stain Result Rare WBCs.  No organisms seen.          Culture Result Streptococcus agalactiae (Group B)  Light growth       Narrative:       Surgery Specimen     Anaerobic Culture [[id number]] Collected: 12/28/22 1349     Order Status: Completed Specimen: Tissue Updated: 12/31/22 1159       Significant Indicator NEG       Source TISS       Site Left Knee Medial Gutter       Culture Result No Anaerobes isolated.     Narrative:       Surgery Specimen     FLUID CULTURE W/GRAM STAIN  Order: [id number]  Status: Final result     Visible to patient: No (inaccessible in MyChart)     Next appt: Today at 11:00 AM in Infectious Diseases (Mateus Shell A.P.R.N.)     Specimen Information: Synovial Fluid   0 Result Notes      Component 1 mo ago   Significant Indicator POS Positive (POS)    Source SYNO    Site Left Knee    Culture Result - Abnormal     Gram Stain Result  Abnormal   Many WBCs.   Rare Gram positive cocci.     Culture Result  Abnormal   Streptococcus agalactiae (Group B)   Light growth   This isolate does NOT demonstrate inducible Clindamycin   resistance in vitro.     Resulting Agency M        Susceptibility     Streptococcus agalactiae (group b)     SHERRIE     Clindamycin <=0.25 mcg/mL Sensitive     Daptomycin <=0.5 mcg/mL Sensitive     Penicillin <=0.03 mcg/mL Sensitive               FLUID CULTURE W/GRAM STAIN  Order: [id number] - Reflex for Order [id number]  Status: Final result    Visible to patient:  No (inaccessible in MyChart)    Next appt: Today at 10:30 AM in Infectious Diseases (Mateus Shell, A.P.R.N.)    Specimen Information: Synovial   0 Result Notes      Component 1 mo ago   Significant Indicator POS Positive (POS)   Source SYNO   Site Knee   Culture Result - Abnormal    Gram Stain Result Many WBCs.  No organisms seen.   Culture Result  Abnormal   Pseudomonas aeruginosa  Light growth    Resulting Agency M        Susceptibility     Pseudomonas aeruginosa     SHERRIE     Cefepime <=2 mcg/mL Sensitive     Ciprofloxacin <=0.25 mcg/mL Sensitive     Gentamicin <=2 mcg/mL Resistant     Levofloxacin <=0.5 mcg/mL Sensitive     Meropenem <=1 mcg/mL Sensitive     Pip/Tazobactam <=8 mcg/mL Sensitive     Tobramycin <=2 mcg/mL Sensitive                 Specimen Collected: 10/26/23 10:15 AM Last Resulted: 10/28/23  9:36 AM             Gram Stain Result No organisms seen.   Culture Result  Abnormal   Enterococcus faecium VRE  Isolated from enrichment broth only, please correlate with  clinical condition.  If daptomycin is used for E. faecium treatment, high-dose  regimen is recommended.  The susceptibility profile for this organism indicates that  Streptomycin would not be an effective component of  combination therapy.  The susceptibility profile for this organism indicates that  Vancomycin would not be an effective component of combination  therapy.    Resulting Agency M        Susceptibility     Enterococcus faecium vre     SHERRIE     Ampicillin >8 mcg/mL Resistant     Gent Synergy <=500 mcg/mL Sensitive     Linezolid 2 mcg/mL Sensitive     Vancomycin >16 mcg/mL Resistant                 Narrative  Performed by: M  Surgery Specimen      Specimen Collected: 10/30/23  6:30 PM Last Resulted: 11/05/23 11:46 AM           ntains abnormal data CULTURE TISSUE W/ GRM STAIN  Order: [id number]  Status: Edited Result - FINAL    Visible to patient: No (inaccessible in MyChart)    Next appt: Today at 10:30 AM in Infectious Diseases  (JAMEE BaezRErrolNErrol)    Specimen Information: Tissue   0 Result Notes      Component 1 mo ago   Significant Indicator POS Positive (POS)   Source TISS   Site Left Knee   Culture Result  Abnormal   Growth noted after further incubation, see below for  organism identification.    Gram Stain Result No organisms seen.   Culture Result  Abnormal   Staphylococcus auricularis  Isolated from enrichment broth only, please correlate with  clinical condition.    Resulting Agency M        Susceptibility     Staphylococcus auricularis     SHERRIE     Ampicillin/sulbactam <=8/4 mcg/mL Sensitive     Azithromycin <=2 mcg/mL Sensitive     Cefazolin <=8 mcg/mL Sensitive     Cefepime <=4 mcg/mL Sensitive     Clindamycin <=0.25 mcg/mL Sensitive     Erythromycin <=0.25 mcg/mL Sensitive     Oxacillin <=0.25 mcg/mL Sensitive     Pip/Tazobactam <=8 mcg/mL Sensitive     Tetracycline <=4 mcg/mL Sensitive     Trimeth/Sulfa <=0.5/9.5 m... Sensitive     Vancomycin 0.5 mcg/mL Sensitive                 Narrative  Performed by: IMAN  Surgery Specimen      Specimen Collected: 10/30/23  6:36 PM Last Resulted: 11/05/23  4:21 PM                   Impression/Assessment      1. Infection of prosthetic knee joint, subsequent encounter        2. Polymicrobial bacterial infection        3. Type 2 diabetes mellitus without complication, without long-term current use of insulin ([facility])          April Alicia is a 62 y.o. female with a history of alcohol and cocaine abuse, cirrhosis, asthma, A-fib on apixaban, 2 diabetes, hypertension. Patient was admitted on July 2022 with a group B strep bacteremia and left knee prosthetic joint infection along with a left shoulder septic arthritis and some subdeltoid abscess.  Plan was for her to discharge to a skilled nursing facility and continue IV ceftriaxone for 6 weeks until 8/30/2022 due to some unknown reason patient only received 4 weeks of antibiotics.  Patient was readmitted on 12/26/2022 due to recurrent  left knee pain and swelling.  She was taken back to the OR on 12/28 underwent explantation and placement of articulating  spacer, wound VAC.  Multiple OR cultures again growing group B Strep.   Underwent synovial fluid removal from the left shoulder on 1/2/2022 WBC 1051, 40% polys, 40% lymphs, not consistent with infection.  Streptococcus group B (penicillin sensitive). Plan at discharge with 6 weeks of IV antibiotics ceftriaxone 2 g daily till 2/7/2023. Orthopedic MD Garcia note recommending patient continue oral antibiotics for at least 1 year prior to reimplantation as she is undergone 2 I&D's in the past with 2 antibiotic spacers which were both failed.  She is at high risk for failing stage II reimplantation and will be subject to amputation if failed again.      12/5/23-patient following up after most recent hospital admission.  Currently on IV cefepime 2 g every 8 hours with end date of 12/11/2023 with plans for second stage reimplantation.  Today she denies any nausea, vomiting, fever, chills, constipation or diarrhea.  She reports that the knee is healing appropriately with no signs of active infection.  Previously late OR cultures were positive for rare growth enterococcal faecium VRE and staphylococcal auricularis from enriched broth.  Staphylococcal was targeted along with the Pseudomonas with cefepime.  Enterococcal faecium was not treated due to possibly being a contaminant as it was only found at 1 OR culture.  Patient reports that she is tolerating IV cefepime with no complaints of side effects.  Pending outside labs.  Most recent labs reviewed from 11/3/2023, WBC WNL 10.5, stable anemia 9.0/27.4, mildly elevated neutrophils before, CMP with mild hyponatremia slowly improving 132, hypocalcemia 7.5, hypoalbuminemia 2.4.  With IV cefepime 2 g every 8 hours until original end date 12/11/2023 and then highly recommend additional joint aspiration. Pt states no plans for 2nd stage due to post op  complications. She went into atrial fibrillation with rapid ventricular response and blood pressure went down into the 60s.  Anesthesia cardioverted her twice with return of sinus rhythm.  Despite 3 L of fluid she remained hypotensive and therefore was transferred to the ICU for intravenous pressors. Note forwarded to Orthopedic surgeon MD Luz     Late OR cultures positive for rare growth enterococcal faecium VRE and rare growth Staphylococcus auricularis.  Contaminants. Enterococcal faecium was not specifically treated by cefepime.   PLAN:   -Continue IV cefepime 2 g every 8 hours until end date of 12/11/2023.  OK to remove PICC after last IV infusion.   -Highly recommend joint aspiration with cultures in at least 2 to 3 weeks after stopping IV antibiotic therapy.  Previous OR cultures positive for late rare growth enterococcal faecium VRE which was not treated by IV cefepime.  Possibly a contaminant   -Medication education provided and side effects to monitor.   -Continue routine follow-up with orthopedic surgery.  No plans for second stage reimplantation due to postop complications but highly recommend joint aspiration 2 to 3 weeks after stopping IV antibiotic therapy  -Recommend to keep blood sugars below 140 to allow for adequate wound healing and prevent secondary infection.  Continue on atorvastatin 20 mg.  Diabetes managed through diet.  Last A1c 5.4 from 1/1/2023  -Education provided on worsening signs and symptoms of infection and when to report to ER/call 911    Return visit: 3 weeks. follow up with primary care physician for chronic medical problems      I have performed a physical exam,  updated ROS and plan today. I have reviewed previous images, labs, and provider notes.      EDITH Baez.    All Patients should seek medical re-evaluation or report to the ER for new, increasing or worsening symptoms. In some circumstances medical conditions can change from the initial evaluation and may  require emergent medical re-evaluation. This includes but is not limited to chest pain, shortness of breath, atypical abdominal pain, atypical headache, ALOC, fever >101, low blood pressure, high respiratory rate (above 30), low oxygen saturation (below 90%), acute delirium, abnormal bleeding, inability to tolerate any intake, weakness on one side of the body, any worsened or concerning conditions.    Please note that this dictation was created using voice recognition software. I have worked with technical experts from [facility] to optimize the interface.  I have made every reasonable attempt to correct obvious errors, but there may be errors of grammar and possibly content that I did not discover before finalizing the note.

## 2023-12-10 ENCOUNTER — HOSPITAL ENCOUNTER (EMERGENCY)
Facility: MEDICAL CENTER | Age: 63
End: 2023-12-10
Attending: EMERGENCY MEDICINE
Payer: MEDICAID

## 2023-12-10 VITALS
WEIGHT: 182 LBS | DIASTOLIC BLOOD PRESSURE: 101 MMHG | OXYGEN SATURATION: 99 % | SYSTOLIC BLOOD PRESSURE: 147 MMHG | HEART RATE: 87 BPM | BODY MASS INDEX: 28.56 KG/M2 | HEIGHT: 67 IN | TEMPERATURE: 97.9 F | RESPIRATION RATE: 17 BRPM

## 2023-12-10 DIAGNOSIS — R46.2 BIZARRE BEHAVIOR: ICD-10-CM

## 2023-12-10 LAB
ALBUMIN SERPL BCP-MCNC: 3.2 G/DL (ref 3.2–4.9)
ALBUMIN/GLOB SERPL: 0.8 G/DL
ALP SERPL-CCNC: 262 U/L (ref 30–99)
ALT SERPL-CCNC: 24 U/L (ref 2–50)
AMPHET UR QL SCN: NEGATIVE
ANION GAP SERPL CALC-SCNC: 9 MMOL/L (ref 7–16)
APPEARANCE UR: CLEAR
AST SERPL-CCNC: 30 U/L (ref 12–45)
BACTERIA #/AREA URNS HPF: NEGATIVE /HPF
BARBITURATES UR QL SCN: NEGATIVE
BASOPHILS # BLD AUTO: 1.2 % (ref 0–1.8)
BASOPHILS # BLD: 0.06 K/UL (ref 0–0.12)
BENZODIAZ UR QL SCN: NEGATIVE
BILIRUB SERPL-MCNC: 0.5 MG/DL (ref 0.1–1.5)
BILIRUB UR QL STRIP.AUTO: NEGATIVE
BUN SERPL-MCNC: 25 MG/DL (ref 8–22)
BZE UR QL SCN: NEGATIVE
CALCIUM ALBUM COR SERPL-MCNC: 9.4 MG/DL (ref 8.5–10.5)
CALCIUM SERPL-MCNC: 8.8 MG/DL (ref 8.5–10.5)
CANNABINOIDS UR QL SCN: NEGATIVE
CHLORIDE SERPL-SCNC: 110 MMOL/L (ref 96–112)
CO2 SERPL-SCNC: 20 MMOL/L (ref 20–33)
COLOR UR: YELLOW
CREAT SERPL-MCNC: 0.6 MG/DL (ref 0.5–1.4)
EOSINOPHIL # BLD AUTO: 0.38 K/UL (ref 0–0.51)
EOSINOPHIL NFR BLD: 7.6 % (ref 0–6.9)
EPI CELLS #/AREA URNS HPF: NEGATIVE /HPF
ERYTHROCYTE [DISTWIDTH] IN BLOOD BY AUTOMATED COUNT: 51.5 FL (ref 35.9–50)
FENTANYL UR QL: NEGATIVE
GFR SERPLBLD CREATININE-BSD FMLA CKD-EPI: 100 ML/MIN/1.73 M 2
GLOBULIN SER CALC-MCNC: 3.9 G/DL (ref 1.9–3.5)
GLUCOSE SERPL-MCNC: 121 MG/DL (ref 65–99)
GLUCOSE UR STRIP.AUTO-MCNC: NEGATIVE MG/DL
HCT VFR BLD AUTO: 35.8 % (ref 37–47)
HGB BLD-MCNC: 11.2 G/DL (ref 12–16)
HYALINE CASTS #/AREA URNS LPF: NORMAL /LPF
IMM GRANULOCYTES # BLD AUTO: 0.03 K/UL (ref 0–0.11)
IMM GRANULOCYTES NFR BLD AUTO: 0.6 % (ref 0–0.9)
KETONES UR STRIP.AUTO-MCNC: NEGATIVE MG/DL
LEUKOCYTE ESTERASE UR QL STRIP.AUTO: NEGATIVE
LYMPHOCYTES # BLD AUTO: 1.25 K/UL (ref 1–4.8)
LYMPHOCYTES NFR BLD: 24.9 % (ref 22–41)
MCH RBC QN AUTO: 28.1 PG (ref 27–33)
MCHC RBC AUTO-ENTMCNC: 31.3 G/DL (ref 32.2–35.5)
MCV RBC AUTO: 89.7 FL (ref 81.4–97.8)
METHADONE UR QL SCN: NEGATIVE
MICRO URNS: ABNORMAL
MONOCYTES # BLD AUTO: 0.4 K/UL (ref 0–0.85)
MONOCYTES NFR BLD AUTO: 8 % (ref 0–13.4)
NEUTROPHILS # BLD AUTO: 2.9 K/UL (ref 1.82–7.42)
NEUTROPHILS NFR BLD: 57.7 % (ref 44–72)
NITRITE UR QL STRIP.AUTO: NEGATIVE
NRBC # BLD AUTO: 0 K/UL
NRBC BLD-RTO: 0 /100 WBC (ref 0–0.2)
OPIATES UR QL SCN: NEGATIVE
OXYCODONE UR QL SCN: POSITIVE
PCP UR QL SCN: NEGATIVE
PH UR STRIP.AUTO: 7 [PH] (ref 5–8)
PLATELET # BLD AUTO: 110 K/UL (ref 164–446)
PMV BLD AUTO: 11.2 FL (ref 9–12.9)
POTASSIUM SERPL-SCNC: 4.1 MMOL/L (ref 3.6–5.5)
PROPOXYPH UR QL SCN: NEGATIVE
PROT SERPL-MCNC: 7.1 G/DL (ref 6–8.2)
PROT UR QL STRIP: 30 MG/DL
RBC # BLD AUTO: 3.99 M/UL (ref 4.2–5.4)
RBC # URNS HPF: NORMAL /HPF
RBC UR QL AUTO: NEGATIVE
SODIUM SERPL-SCNC: 139 MMOL/L (ref 135–145)
SP GR UR STRIP.AUTO: 1.01
UROBILINOGEN UR STRIP.AUTO-MCNC: 0.2 MG/DL
WBC # BLD AUTO: 5 K/UL (ref 4.8–10.8)
WBC #/AREA URNS HPF: NORMAL /HPF

## 2023-12-10 PROCEDURE — 99285 EMERGENCY DEPT VISIT HI MDM: CPT

## 2023-12-10 PROCEDURE — 36415 COLL VENOUS BLD VENIPUNCTURE: CPT

## 2023-12-10 PROCEDURE — 81001 URINALYSIS AUTO W/SCOPE: CPT

## 2023-12-10 PROCEDURE — 85025 COMPLETE CBC W/AUTO DIFF WBC: CPT

## 2023-12-10 PROCEDURE — 80307 DRUG TEST PRSMV CHEM ANLYZR: CPT

## 2023-12-10 PROCEDURE — 80053 COMPREHEN METABOLIC PANEL: CPT

## 2023-12-10 ASSESSMENT — PAIN DESCRIPTION - PAIN TYPE: TYPE: ACUTE PAIN

## 2023-12-10 ASSESSMENT — FIBROSIS 4 INDEX: FIB4 SCORE: 2.67

## 2023-12-11 NOTE — ED NOTES
Pt transfer to [facility]. GCS 15. IV discontinued and gauze placed, pt in possession of belongings. Pt provided discharge education and information pertaining to follow up appointments. Pt received copy of discharge instructions and verbalized understanding.     Vitals:    12/10/23 2034   BP: (!) 147/101   Pulse: 87   Resp: 17   Temp: 36.6 °C (97.9 °F)   SpO2: 99%

## 2023-12-11 NOTE — ED PROVIDER NOTES
ER Provider Note    Scribed for Santana Chambers M.d. by Kristopher Foley. 12/10/2023  8:20 PM    Primary Care Provider: Anum Gatica M.D.    CHIEF COMPLAINT  Chief Complaint   Patient presents with    Other     Feeling tired  Bad sleep pattern     HPI/ROS  LIMITATION TO HISTORY   Select: : None  OUTSIDE HISTORIAN(S):  Nurse provided history.    April Alicia is a 63 y.o. female who presents to the ED for evaluation of reported bizzare behavior.  She denies any cough. She explains she was sent from [facility] for difficulty sleeping well. But only for one night.  Patient was at rehab for left knee pain and infection. Per the nurse, she is alert, awake, and oriented x4 and has a GCS of 15.    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    Liver disease     Pneumonia 02/2020    Seizure disorder (HCC)        SURGICAL HISTORY  Past Surgical History:   Procedure Laterality Date    PB TOTAL KNEE ARTHROPLASTY Left 10/30/2023    Procedure: LEFT TOTAL KNEE ARTHROPLASTY EXPLANTATION, INSERTION OF ANTIBIOTIC SPACER, AND APPLICATION OF INCISIONAL WOUND VACUUM;  Surgeon: Wild Luz M.D.;  Location: [facility];  Service: Orthopedics    PB REVISE KNEE JOINT REPLACE,ALL PARTS Left 12/28/2022    Procedure: REVISION, TOTAL ARTHROPLASTY, KNEE, ALL COMPONENTS-LEFT;  Surgeon: Wild Luz M.D.;  Location: [facility];  Service: Orthopedics    IRRIGATION & DEBRIDEMENT GENERAL Left 12/28/2022    Procedure: IRRIGATION AND DEBRIDEMENT, WOUND;  Surgeon: Wild Luz M.D.;  Location: [facility]  "[facility];  Service: Orthopedics    PB REVISE KNEE JOINT REPLACE,ALL PARTS Left 7/19/2022    Procedure: REVISION, TOTAL ARTHROPLASTY, KNEE, ALL COMPONENTS - ANTIBIOTIC SPACER;  Surgeon: Wild Luz M.D.;  Location: SURGERY [facility];  Service: Orthopedics    IRRIGATION & DEBRIDEMENT GENERAL Left 7/17/2022    Procedure: IRRIGATION AND DEBRIDEMENT, SHOULDER;  Surgeon: Obdulio Gray M.D.;  Location: SURGERY [facility];  Service: Orthopedics    PB TOTAL KNEE ARTHROPLASTY Left 8/24/2020    Procedure: ARTHROPLASTY, KNEE, TOTAL;  Surgeon: Víctor Faustin M.D.;  Location: SURGERY [facility];  Service: Orthopedics    KNEE ARTHROPLASTY TOTAL Right 2018    IRRIGATION & DEBRIDEMENT ORTHO Right 9/3/2017    Procedure: IRRIGATION & DEBRIDEMENT ORTHO, POLY EXCHANGE;  Surgeon: Jerome Russell M.D.;  Location: SURGERY [facility];  Service: Orthopedics    COLONOSCOPY WITH CLIPPING  10/28/2015    Procedure: COLONOSCOPY WITH CLIPPING;  Surgeon: Ruben Colon M.D.;  Location: ENDOSCOPY [facility];  Service:     COLONOSCOPY WITH SCLEROTHERAPY  10/28/2015    Procedure: COLONOSCOPY WITH SCLEROTHERAPY;  Surgeon: Ruben Colon M.D.;  Location: ENDOSCOPY [facility];  Service:     COLONOSCOPY WITH TATTOOING  10/28/2015    Procedure: COLONOSCOPY WITH TATTOOING;  Surgeon: Ruben Colon M.D.;  Location: [facility];  Service:     GYN SURGERY  2003    hysteroscoopy    GYN SURGERY  1982    tubal ligation       FAMILY HISTORY  No family history noted.    SOCIAL HISTORY   reports that she quit smoking about 6 years ago. Her smoking use included cigarettes. She quit smokeless tobacco use about 2 years ago. She reports that she does not currently use alcohol. She reports that she does not currently use drugs.    CURRENT MEDICATIONS      ALLERGIES  Nkda [no known drug allergy]    PHYSICAL EXAM  /80   Pulse 85   Temp 37.3 °C (99.1 °F) (Temporal)   Resp 19   Ht 1.702 m (5' 7\")   Wt 82.6 kg " (182 lb)   LMP 06/02/2008   SpO2 97%   BMI 28.51 kg/m²   Constitutional: Alert in no apparent distress, Answers questions appropriately,  HENT: No signs of trauma, 2 mm pupils equal and reactive, Bilateral external ears normal, Nose normal. Uvula midline.   Eyes: Pupils are equal and reactive, Conjunctiva normal, Non-icteric.   Neck: Normal range of motion, No tenderness, Supple, No stridor.   Lymphatic: No lymphadenopathy noted.   Cardiovascular: Regular rate and rhythm, no murmurs.   Thorax & Lungs: Normal breath sounds, No respiratory distress, No wheezing, No chest tenderness.   Abdomen: Bowel sounds normal, Soft, No tenderness, No peritoneal signs, No masses, No pulsatile masses.   Skin: Warm, Dry, No erythema, No rash.   Back: No bony tenderness, No CVA tenderness.   Extremities: Dressing over top of large left knee incision that is clean dry and intact, no increase warmth or erythema, Intact distal pulses, No edema, No cyanosis.  Musculoskeletal: Good range of motion in all major joints. No tenderness to palpation or major deformities noted.   Neurologic: Alert and oriented x4, Normal motor function, Normal sensory function, No focal deficits noted.   Psychiatric: Affect normal, Judgment normal, Mood normal.     DIAGNOSTIC STUDIES    Labs:   Labs Reviewed   CBC WITH DIFFERENTIAL   COMP METABOLIC PANEL   URINALYSIS   URINE DRUG SCREEN      COURSE & MEDICAL DECISION MAKING     ED Observation Status? No; Patient does not meet criteria for ED Observation.     INITIAL ASSESSMENT, COURSE AND PLAN  Care Narrative: 63 y.o. female with prior history of knee infection presents to the hospital with reported bizarre behavior.  Patient denies any of this bizarre behavior    Given complexity of patient's past medical history I obtained labs and urinalysis and drug screen  Laboratory findings are unremarkable  Incidental abnormalities noted in patient's discharge instructions for her on follow-up  Urine drug screen  positive for oxycodone  Patient will be returned to [facility]        8:22 PM - Patient will be evaluated with labs. Patient agrees to plan of care. Ordered for CMP, CBC w/ Diff, Urine Drug Screen, and UA to evaluate her symptoms.      8:29 PM - Paged [facility]. No response from [facility] line 100, paged line 200.     8:34 PM - Spoke with [facility] about the patient's condition who had concerns with the patient using fentanyl.  I spoke with patient she denies using any illicit substances or drugs of any kind.  Patient denies any fentanyl use and did not know what fentanyl was.    10:27 PM  PM - Patient was reevaluated at bedside. I discussed plan for discharge and follow up as outlined below. The patient is stable for discharge at this time and will return for any new or worsening symptoms. Patient verbalizes understanding and support with my plan for discharge.      Patient was reported to have bizarre behavior at [facility] however none has been witnessed in the last 3 hours in emergency department.  Patient agrees to return if fever or increased pain develop      DISPOSITION AND DISCUSSIONS  I have discussed management of the patient with the following physicians and CANDIDO's:  None    Discussion of management with other QHP or appropriate source(s):  Velia      Escalation of care considered, and ultimately not performed: acute inpatient care management, however at this time, the patient is most appropriate for outpatient management.    Barriers to care at this time, including but not limited to:  None known at this time .     FINAL DIAGNOSIS  1. Bizarre behavior       Kristopher ARGUETA (Lisandro), am scribing for, and in the presence of, Santana Chambers M.D..    Electronically signed by: Kristopher Foley (Lisandro), 12/10/2023    Santana ARGUETA M.D. personally performed the services described in this documentation, as scribed by Kristopher Foley in my presence, and it is both accurate and complete.      The  note accurately reflects work and decisions made by me.  Santana Chambers M.D.  12/10/2023  10:27 PM

## 2023-12-11 NOTE — DISCHARGE PLANNING
Medical Social Work     SW received a call from the RN requesting SW assistance with [facility] transport back to [facility]. SUZI faxed a PCS form to [facility] and set up transport for 2245. Transfer packet complete and given to the RN.

## 2023-12-11 NOTE — ED TRIAGE NOTES
Chief Complaint   Patient presents with    Other     Feeling tired  Bad sleep pattern     Pt BIBA from [facility] for not sleeping well and feeling tired. EMS called for patient by SNF nurse stating she is acting weird and altered. Pt stated she has not acute complain beside her left knee pain. Pt is in [facility] for knee infection and taking antibiotic for that, today was the last round of her antibiotic.     Pt AAOx4, GCS 15 right now.

## 2023-12-11 NOTE — DISCHARGE INSTRUCTIONS
Please discuss the following findings with your regular doctor:  No orders to display     Labs Reviewed   CBC WITH DIFFERENTIAL - Abnormal; Notable for the following components:       Result Value    RBC 3.99 (*)     Hemoglobin 11.2 (*)     Hematocrit 35.8 (*)     MCHC 31.3 (*)     RDW 51.5 (*)     Platelet Count 110 (*)     Eosinophils 7.60 (*)     All other components within normal limits   COMP METABOLIC PANEL - Abnormal; Notable for the following components:    Glucose 121 (*)     Bun 25 (*)     Alkaline Phosphatase 262 (*)     Globulin 3.9 (*)     All other components within normal limits   URINALYSIS - Abnormal; Notable for the following components:    Protein 30 (*)     All other components within normal limits   URINE DRUG SCREEN - Abnormal; Notable for the following components:    Oxycodone Positive (*)     All other components within normal limits   URINE MICROSCOPIC (W/UA)   ESTIMATED GFR

## 2023-12-13 ENCOUNTER — HOSPITAL ENCOUNTER (OUTPATIENT)
Facility: MEDICAL CENTER | Age: 63
End: 2023-12-13
Attending: INTERNAL MEDICINE
Payer: MEDICAID

## 2023-12-13 LAB — ERYTHROCYTE [SEDIMENTATION RATE] IN BLOOD BY WESTERGREN METHOD: 20 MM/HOUR (ref 0–25)

## 2023-12-13 PROCEDURE — 85652 RBC SED RATE AUTOMATED: CPT

## 2024-01-02 ENCOUNTER — APPOINTMENT (OUTPATIENT)
Dept: RADIOLOGY | Facility: MEDICAL CENTER | Age: 64
DRG: 559 | End: 2024-01-02
Attending: EMERGENCY MEDICINE
Payer: MEDICAID

## 2024-01-02 ENCOUNTER — HOSPITAL ENCOUNTER (INPATIENT)
Facility: MEDICAL CENTER | Age: 64
LOS: 7 days | DRG: 559 | End: 2024-01-09
Attending: EMERGENCY MEDICINE | Admitting: FAMILY MEDICINE
Payer: MEDICAID

## 2024-01-02 DIAGNOSIS — M00.862 ARTHRITIS OF LEFT KNEE DUE TO OTHER BACTERIA (HCC): ICD-10-CM

## 2024-01-02 DIAGNOSIS — G89.4 CHRONIC PAIN SYNDROME: ICD-10-CM

## 2024-01-02 DIAGNOSIS — M25.562 CHRONIC PAIN OF BOTH KNEES: ICD-10-CM

## 2024-01-02 DIAGNOSIS — G89.29 CHRONIC PAIN OF BOTH KNEES: ICD-10-CM

## 2024-01-02 DIAGNOSIS — L03.116 CELLULITIS OF LEFT LOWER EXTREMITY: ICD-10-CM

## 2024-01-02 DIAGNOSIS — M25.561 CHRONIC PAIN OF BOTH KNEES: ICD-10-CM

## 2024-01-02 DIAGNOSIS — M00.9 PYOGENIC ARTHRITIS OF LEFT KNEE JOINT, DUE TO UNSPECIFIED ORGANISM (HCC): ICD-10-CM

## 2024-01-02 DIAGNOSIS — M00.9 PYOGENIC ARTHRITIS OF LEFT KNEE JOINT, DUE TO UNSPECIFIED ORGANISM (HCC): Primary | ICD-10-CM

## 2024-01-02 PROBLEM — R41.82 AMS (ALTERED MENTAL STATUS): Status: ACTIVE | Noted: 2024-01-02

## 2024-01-02 LAB
AMMONIA PLAS-SCNC: 69 UMOL/L (ref 11–45)
ANION GAP SERPL CALC-SCNC: 12 MMOL/L (ref 7–16)
APPEARANCE FLD: NORMAL
BASOPHILS # BLD AUTO: 0.5 % (ref 0–1.8)
BASOPHILS # BLD: 0.02 K/UL (ref 0–0.12)
BODY FLD TYPE: NORMAL
BUN SERPL-MCNC: 40 MG/DL (ref 8–22)
CALCIUM SERPL-MCNC: 7.4 MG/DL (ref 8.5–10.5)
CHLORIDE SERPL-SCNC: 106 MMOL/L (ref 96–112)
CO2 SERPL-SCNC: 15 MMOL/L (ref 20–33)
COLOR FLD: NORMAL
CREAT SERPL-MCNC: 1.26 MG/DL (ref 0.5–1.4)
CRP SERPL HS-MCNC: 8.62 MG/DL (ref 0–0.75)
EOSINOPHIL # BLD AUTO: 0.05 K/UL (ref 0–0.51)
EOSINOPHIL NFR BLD: 1.4 % (ref 0–6.9)
ERYTHROCYTE [DISTWIDTH] IN BLOOD BY AUTOMATED COUNT: 45 FL (ref 35.9–50)
ERYTHROCYTE [SEDIMENTATION RATE] IN BLOOD BY WESTERGREN METHOD: 20 MM/HOUR (ref 0–25)
GFR SERPLBLD CREATININE-BSD FMLA CKD-EPI: 48 ML/MIN/1.73 M 2
GLUCOSE BLD STRIP.AUTO-MCNC: 94 MG/DL (ref 65–99)
GLUCOSE SERPL-MCNC: 162 MG/DL (ref 65–99)
GRAM STN SPEC: NORMAL
HCT VFR BLD AUTO: 28.4 % (ref 37–47)
HGB BLD-MCNC: 9.4 G/DL (ref 12–16)
IMM GRANULOCYTES # BLD AUTO: 0.01 K/UL (ref 0–0.11)
IMM GRANULOCYTES NFR BLD AUTO: 0.3 % (ref 0–0.9)
LYMPHOCYTES # BLD AUTO: 0.66 K/UL (ref 1–4.8)
LYMPHOCYTES NFR BLD: 18 % (ref 22–41)
MCH RBC QN AUTO: 27.6 PG (ref 27–33)
MCHC RBC AUTO-ENTMCNC: 33.1 G/DL (ref 32.2–35.5)
MCV RBC AUTO: 83.5 FL (ref 81.4–97.8)
MONOCYTES # BLD AUTO: 0.24 K/UL (ref 0–0.85)
MONOCYTES NFR BLD AUTO: 6.6 % (ref 0–13.4)
MONOS+MACROS NFR FLD MANUAL: 2 %
NEUTROPHILS # BLD AUTO: 2.68 K/UL (ref 1.82–7.42)
NEUTROPHILS NFR BLD: 73.2 % (ref 44–72)
NEUTROPHILS NFR FLD: 98 %
NRBC # BLD AUTO: 0 K/UL
NRBC BLD-RTO: 0 /100 WBC (ref 0–0.2)
NUC CELL # FLD: NORMAL CELLS/UL
PLATELET # BLD AUTO: 95 K/UL (ref 164–446)
PLATELETS.RETICULATED NFR BLD AUTO: 0.9 % (ref 0.6–13.1)
PMV BLD AUTO: 9.8 FL (ref 9–12.9)
POTASSIUM SERPL-SCNC: 3.7 MMOL/L (ref 3.6–5.5)
RBC # BLD AUTO: 3.4 M/UL (ref 4.2–5.4)
RBC # FLD: NORMAL CELLS/UL
SIGNIFICANT IND 70042: NORMAL
SITE SITE: NORMAL
SODIUM SERPL-SCNC: 133 MMOL/L (ref 135–145)
SOURCE SOURCE: NORMAL
WBC # BLD AUTO: 3.7 K/UL (ref 4.8–10.8)

## 2024-01-02 PROCEDURE — 96365 THER/PROPH/DIAG IV INF INIT: CPT

## 2024-01-02 PROCEDURE — 73564 X-RAY EXAM KNEE 4 OR MORE: CPT | Mod: LT

## 2024-01-02 PROCEDURE — 86140 C-REACTIVE PROTEIN: CPT

## 2024-01-02 PROCEDURE — A9270 NON-COVERED ITEM OR SERVICE: HCPCS | Performed by: FAMILY MEDICINE

## 2024-01-02 PROCEDURE — 89051 BODY FLUID CELL COUNT: CPT

## 2024-01-02 PROCEDURE — 87205 SMEAR GRAM STAIN: CPT

## 2024-01-02 PROCEDURE — 85652 RBC SED RATE AUTOMATED: CPT

## 2024-01-02 PROCEDURE — 85025 COMPLETE CBC W/AUTO DIFF WBC: CPT

## 2024-01-02 PROCEDURE — 87070 CULTURE OTHR SPECIMN AEROBIC: CPT

## 2024-01-02 PROCEDURE — 700105 HCHG RX REV CODE 258

## 2024-01-02 PROCEDURE — 87999 UNLISTED MICROBIOLOGY PX: CPT

## 2024-01-02 PROCEDURE — 82140 ASSAY OF AMMONIA: CPT

## 2024-01-02 PROCEDURE — 700111 HCHG RX REV CODE 636 W/ 250 OVERRIDE (IP): Mod: JZ,UD | Performed by: EMERGENCY MEDICINE

## 2024-01-02 PROCEDURE — 99285 EMERGENCY DEPT VISIT HI MDM: CPT

## 2024-01-02 PROCEDURE — 85055 RETICULATED PLATELET ASSAY: CPT

## 2024-01-02 PROCEDURE — 36415 COLL VENOUS BLD VENIPUNCTURE: CPT

## 2024-01-02 PROCEDURE — 80048 BASIC METABOLIC PNL TOTAL CA: CPT

## 2024-01-02 PROCEDURE — 82962 GLUCOSE BLOOD TEST: CPT

## 2024-01-02 PROCEDURE — 87077 CULTURE AEROBIC IDENTIFY: CPT

## 2024-01-02 PROCEDURE — 700105 HCHG RX REV CODE 258: Mod: UD | Performed by: EMERGENCY MEDICINE

## 2024-01-02 PROCEDURE — 99999 PR NO CHARGE: CPT | Performed by: FAMILY MEDICINE

## 2024-01-02 PROCEDURE — 700102 HCHG RX REV CODE 250 W/ 637 OVERRIDE(OP): Performed by: FAMILY MEDICINE

## 2024-01-02 PROCEDURE — 770020 HCHG ROOM/CARE - TELE (206)

## 2024-01-02 PROCEDURE — 20610 DRAIN/INJ JOINT/BURSA W/O US: CPT

## 2024-01-02 RX ORDER — FERROUS SULFATE 325(65) MG
325 TABLET ORAL DAILY
COMMUNITY

## 2024-01-02 RX ORDER — SODIUM BICARBONATE 650 MG/1
1300 TABLET ORAL 2 TIMES DAILY
Status: DISCONTINUED | OUTPATIENT
Start: 2024-01-02 | End: 2024-01-02

## 2024-01-02 RX ORDER — ACETAMINOPHEN 500 MG
1000 TABLET ORAL EVERY 6 HOURS PRN
Status: DISCONTINUED | OUTPATIENT
Start: 2024-01-02 | End: 2024-01-09 | Stop reason: HOSPADM

## 2024-01-02 RX ORDER — ACETAMINOPHEN 650 MG/1
650 SUPPOSITORY RECTAL EVERY 6 HOURS PRN
Status: DISCONTINUED | OUTPATIENT
Start: 2024-01-02 | End: 2024-01-09 | Stop reason: HOSPADM

## 2024-01-02 RX ORDER — FERROUS SULFATE 325(65) MG
325 TABLET ORAL DAILY
Status: DISCONTINUED | OUTPATIENT
Start: 2024-01-03 | End: 2024-01-02

## 2024-01-02 RX ORDER — SODIUM CHLORIDE 9 MG/ML
500 INJECTION, SOLUTION INTRAVENOUS ONCE
Status: COMPLETED | OUTPATIENT
Start: 2024-01-02 | End: 2024-01-02

## 2024-01-02 RX ORDER — VITAMIN B COMPLEX
2000 TABLET ORAL DAILY
Status: DISCONTINUED | OUTPATIENT
Start: 2024-01-03 | End: 2024-01-02

## 2024-01-02 RX ORDER — LABETALOL HYDROCHLORIDE 5 MG/ML
10 INJECTION, SOLUTION INTRAVENOUS EVERY 4 HOURS PRN
Status: DISCONTINUED | OUTPATIENT
Start: 2024-01-02 | End: 2024-01-09 | Stop reason: HOSPADM

## 2024-01-02 RX ORDER — POTASSIUM CHLORIDE 750 MG/1
10 TABLET, FILM COATED, EXTENDED RELEASE ORAL DAILY
Status: DISCONTINUED | OUTPATIENT
Start: 2024-01-03 | End: 2024-01-09 | Stop reason: HOSPADM

## 2024-01-02 RX ORDER — CARBOXYMETHYLCELLULOSE SODIUM 5 MG/ML
2 SOLUTION/ DROPS OPHTHALMIC PRN
Status: DISCONTINUED | OUTPATIENT
Start: 2024-01-02 | End: 2024-01-02

## 2024-01-02 RX ORDER — GABAPENTIN 400 MG/1
400 CAPSULE ORAL 3 TIMES DAILY
Status: ON HOLD | COMMUNITY
End: 2024-01-09

## 2024-01-02 RX ORDER — GABAPENTIN 400 MG/1
400 CAPSULE ORAL EVERY 8 HOURS
Status: DISCONTINUED | OUTPATIENT
Start: 2024-01-02 | End: 2024-01-05

## 2024-01-02 RX ORDER — OXYCODONE HYDROCHLORIDE 10 MG/1
10 TABLET ORAL EVERY 4 HOURS PRN
Status: ON HOLD | COMMUNITY
End: 2024-01-09

## 2024-01-02 RX ORDER — LOSARTAN POTASSIUM 100 MG/1
100 TABLET ORAL DAILY
COMMUNITY

## 2024-01-02 RX ORDER — TIOTROPIUM BROMIDE 18 UG/1
1 CAPSULE ORAL; RESPIRATORY (INHALATION) DAILY
Status: DISCONTINUED | OUTPATIENT
Start: 2024-01-03 | End: 2024-01-02

## 2024-01-02 RX ORDER — LOSARTAN POTASSIUM 50 MG/1
100 TABLET ORAL DAILY
Status: DISCONTINUED | OUTPATIENT
Start: 2024-01-03 | End: 2024-01-09 | Stop reason: HOSPADM

## 2024-01-02 RX ORDER — POLYETHYLENE GLYCOL 3350 17 G/17G
1 POWDER, FOR SOLUTION ORAL
Status: DISCONTINUED | OUTPATIENT
Start: 2024-01-02 | End: 2024-01-09 | Stop reason: HOSPADM

## 2024-01-02 RX ORDER — MAGNESIUM OXIDE 400 MG/1
400 TABLET ORAL DAILY
Status: DISCONTINUED | OUTPATIENT
Start: 2024-01-03 | End: 2024-01-02

## 2024-01-02 RX ORDER — METOPROLOL SUCCINATE 25 MG/1
25 TABLET, EXTENDED RELEASE ORAL NIGHTLY
Status: DISCONTINUED | OUTPATIENT
Start: 2024-01-02 | End: 2024-01-09 | Stop reason: HOSPADM

## 2024-01-02 RX ORDER — BISACODYL 10 MG
10 SUPPOSITORY, RECTAL RECTAL
Status: DISCONTINUED | OUTPATIENT
Start: 2024-01-02 | End: 2024-01-09 | Stop reason: HOSPADM

## 2024-01-02 RX ORDER — B-COMPLEX WITH VITAMIN C
1 TABLET ORAL 2 TIMES DAILY WITH MEALS
Status: DISCONTINUED | OUTPATIENT
Start: 2024-01-02 | End: 2024-01-02

## 2024-01-02 RX ORDER — FERROUS SULFATE 325(65) MG
325 TABLET ORAL DAILY
Status: DISCONTINUED | OUTPATIENT
Start: 2024-01-03 | End: 2024-01-09 | Stop reason: HOSPADM

## 2024-01-02 RX ORDER — LACTULOSE 20 G/30ML
15 SOLUTION ORAL DAILY
Status: DISCONTINUED | OUTPATIENT
Start: 2024-01-03 | End: 2024-01-02

## 2024-01-02 RX ORDER — LACTULOSE 20 G/30ML
15 SOLUTION ORAL DAILY
Status: DISCONTINUED | OUTPATIENT
Start: 2024-01-03 | End: 2024-01-09 | Stop reason: HOSPADM

## 2024-01-02 RX ORDER — AMOXICILLIN 250 MG
2 CAPSULE ORAL 2 TIMES DAILY
Status: DISCONTINUED | OUTPATIENT
Start: 2024-01-02 | End: 2024-01-09 | Stop reason: HOSPADM

## 2024-01-02 RX ORDER — ATORVASTATIN CALCIUM 20 MG/1
20 TABLET, FILM COATED ORAL NIGHTLY
Status: DISCONTINUED | OUTPATIENT
Start: 2024-01-02 | End: 2024-01-09 | Stop reason: HOSPADM

## 2024-01-02 RX ORDER — PANTOPRAZOLE SODIUM 20 MG/1
20 TABLET, DELAYED RELEASE ORAL DAILY
Status: DISCONTINUED | OUTPATIENT
Start: 2024-01-03 | End: 2024-01-02

## 2024-01-02 RX ORDER — ZINC SULFATE 50(220)MG
220 CAPSULE ORAL DAILY
Status: DISCONTINUED | OUTPATIENT
Start: 2024-01-03 | End: 2024-01-02

## 2024-01-02 RX ORDER — FUROSEMIDE 20 MG/1
20 TABLET ORAL DAILY
Status: DISCONTINUED | OUTPATIENT
Start: 2024-01-03 | End: 2024-01-09 | Stop reason: HOSPADM

## 2024-01-02 RX ORDER — LOSARTAN POTASSIUM 50 MG/1
100 TABLET ORAL
Status: DISCONTINUED | OUTPATIENT
Start: 2024-01-03 | End: 2024-01-02

## 2024-01-02 RX ORDER — POTASSIUM CHLORIDE 20 MEQ/1
10 TABLET, EXTENDED RELEASE ORAL DAILY
Status: DISCONTINUED | OUTPATIENT
Start: 2024-01-03 | End: 2024-01-02

## 2024-01-02 RX ORDER — OMEPRAZOLE 20 MG/1
20 CAPSULE, DELAYED RELEASE ORAL DAILY
Status: DISCONTINUED | OUTPATIENT
Start: 2024-01-03 | End: 2024-01-09 | Stop reason: HOSPADM

## 2024-01-02 RX ADMIN — SODIUM CHLORIDE 500 ML: 9 INJECTION, SOLUTION INTRAVENOUS at 23:51

## 2024-01-02 RX ADMIN — SODIUM CHLORIDE 500 ML: 9 INJECTION, SOLUTION INTRAVENOUS at 21:05

## 2024-01-02 RX ADMIN — DOCUSATE SODIUM 50 MG AND SENNOSIDES 8.6 MG 2 TABLET: 8.6; 5 TABLET, FILM COATED ORAL at 19:12

## 2024-01-02 RX ADMIN — CEFEPIME 2 G: 2 INJECTION, POWDER, FOR SOLUTION INTRAVENOUS at 16:33

## 2024-01-02 ASSESSMENT — FIBROSIS 4 INDEX
FIB4 SCORE: 4.06
FIB4 SCORE: 3.51

## 2024-01-02 ASSESSMENT — PAIN DESCRIPTION - PAIN TYPE: TYPE: ACUTE PAIN

## 2024-01-02 NOTE — ED TRIAGE NOTES
"PT to triage, per pt her granddaughter drover her to the hospital for her lt knee swelling and pain.  PT stated that she has an infection in her lt knee from a prior surgery.  PT reports drowsiness. PT answers all questions appropriately, a&Ox 4, denies taking any medication.  Family is not with pt at this time  Chief Complaint   Patient presents with    Knee Pain     Lt       Fatigue     BP (!) 127/90   Pulse 77   Temp 36.9 °C (98.4 °F) (Temporal)   Resp 17   Ht 1.702 m (5' 7\")   Wt 81.6 kg (180 lb)   SpO2 98%     "

## 2024-01-02 NOTE — ED PROVIDER NOTES
It is awful ER Provider Note    Scribed for Ari Gamez M.d. by Wesley Mcleod. 1/2/2024  3:36 PM    Primary Care Provider: Anum Gatica M.D.    CHIEF COMPLAINT  Chief Complaint   Patient presents with    Knee Pain     Lt       Fatigue     EXTERNAL RECORDS REVIEWED  Inpatient Notes show a recent hospitalization from late October to early November of 2023 for septic arthritis of the knee and related surgeries. She was discharged to a skilled nursing facility to complete IV antibiotics through December 12, 2023.    HPI/ROS  LIMITATION TO HISTORY   Select: The patient is slightly drowsy and appears to be hard of hearing.    OUTSIDE HISTORIAN(S):  Family Collateral history provided by patient's daughter was relayed to us through social work.    April Alicia is a 63 y.o. female who presents to the ED complaining of left sided knee pain onset a couple of days ago. The patient reports she has not been able to walk normally. She states she was recently hospitalized recently for similar symptoms and had surgery performed by Dr. Luz (orthopedics) and received antibiotics before being discharged to a skilled nursing facility. She states she was discharged from her skilled nursing facility a couple of days ago and is now living at home with her friend named Dennis. She notes she was feeling better at the time of discharge.     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    Liver disease     Pneumonia 02/2020    Seizure disorder (HCC)        SURGICAL HISTORY  Past Surgical History:   Procedure  Laterality Date    PB TOTAL KNEE ARTHROPLASTY Left 10/30/2023    Procedure: LEFT TOTAL KNEE ARTHROPLASTY EXPLANTATION, INSERTION OF ANTIBIOTIC SPACER, AND APPLICATION OF INCISIONAL WOUND VACUUM;  Surgeon: Wild Luz M.D.;  Location: [facility];  Service: Orthopedics    PB REVISE KNEE JOINT REPLACE,ALL PARTS Left 12/28/2022    Procedure: REVISION, TOTAL ARTHROPLASTY, KNEE, ALL COMPONENTS-LEFT;  Surgeon: Wild Luz M.D.;  Location: [facility];  Service: Orthopedics    IRRIGATION & DEBRIDEMENT GENERAL Left 12/28/2022    Procedure: IRRIGATION AND DEBRIDEMENT, WOUND;  Surgeon: Wild Luz M.D.;  Location: [facility];  Service: Orthopedics    PB REVISE KNEE JOINT REPLACE,ALL PARTS Left 7/19/2022    Procedure: REVISION, TOTAL ARTHROPLASTY, KNEE, ALL COMPONENTS - ANTIBIOTIC SPACER;  Surgeon: Wild Luz M.D.;  Location: [facility];  Service: Orthopedics    IRRIGATION & DEBRIDEMENT GENERAL Left 7/17/2022    Procedure: IRRIGATION AND DEBRIDEMENT, SHOULDER;  Surgeon: Obdulio Gray M.D.;  Location: [facility];  Service: Orthopedics    PB TOTAL KNEE ARTHROPLASTY Left 8/24/2020    Procedure: ARTHROPLASTY, KNEE, TOTAL;  Surgeon: Víctor Faustin M.D.;  Location: [facility];  Service: Orthopedics    KNEE ARTHROPLASTY TOTAL Right 2018    IRRIGATION & DEBRIDEMENT ORTHO Right 9/3/2017    Procedure: IRRIGATION & DEBRIDEMENT ORTHO, POLY EXCHANGE;  Surgeon: Jerome Russell M.D.;  Location: [facility];  Service: Orthopedics    COLONOSCOPY WITH CLIPPING  10/28/2015    Procedure: COLONOSCOPY WITH CLIPPING;  Surgeon: Ruben Colon M.D.;  Location: ENDOSCOPY [facility] [place];  Service:     COLONOSCOPY WITH SCLEROTHERAPY  10/28/2015    Procedure: COLONOSCOPY WITH SCLEROTHERAPY;  Surgeon: Ruben Colon M.D.;  Location: [facility];  Service:     COLONOSCOPY WITH TATTOOING  10/28/2015    Procedure: COLONOSCOPY WITH TATTOOING;   Surgeon: Ruben Colon M.D.;  Location: ENDOSCOPY [facility] [place];  Service:     GYN SURGERY  2003    hysteroscoopy    GYN SURGERY  1982    tubal ligation       FAMILY HISTORY  No family history noted.    SOCIAL HISTORY   reports that she quit smoking about 7 years ago. Her smoking use included cigarettes. She quit smokeless tobacco use about 2 years ago. She reports that she does not currently use alcohol. She reports that she does not currently use drugs.    CURRENT MEDICATIONS  Previous Medications    ATORVASTATIN (LIPITOR) 20 MG TAB    Take 1 Tablet by mouth every evening.    BACITRACIN 500 UNIT/GM OINTMENT        BUPIVACAINE-0.5%-EPINEPHRINE 1:246517 PF (SENSORCAINE-MPF/EPINEPHRINE) 0.5% -1:[id number] SOLUTION        CALCIUM/VITAMIN D (OYSTER SHELL CALCIUM/D) 500-200 MG-UNIT TAB    1 tbl twice a day by oral route.    CHOLECALCIFEROL (VITAMIN D3) 50 MCG (2000 UT) TAB    Take 2,000 Units by mouth every day.    FERROUS SULFATE 325 (65 FE) MG TABLET    Take 325 mg by mouth every day.    FUROSEMIDE (LASIX) 20 MG TAB    Take 1 Tablet by mouth every day.    LACTULOSE 20 GM/30ML SOLUTION    Take 15 mL by mouth every day.    LOSARTAN (COZAAR) 100 MG TAB        MAGNESIUM OXIDE (MAG-OX) 400 MG TAB TABLET    Take 400 mg by mouth every day.    METOPROLOL SR (TOPROL XL) 25 MG TABLET SR 24 HR    Take 1 Tablet by mouth every evening.    PANTOPRAZOLE (PROTONIX) 20 MG TABLET    Take 20 mg by mouth every day.    POTASSIUM CHLORIDE (MICRO-K) 10 MEQ CAPSULE    Take 1 Capsule by mouth every day.    POTASSIUM CHLORIDE SA (K-DUR) 10 MEQ TAB CR    Take 1 Tablet by mouth every day.    REFRESH TEARS 0.5 % SOLUTION    Administer 2 Drops into both eyes as needed (dry eyes).    SENNA-DOCUSATE (PERICOLACE OR SENOKOT S) 8.6-50 MG TAB    Take 2 Tablets by mouth 2 times a day.    SODIUM BICARBONATE (SODIUM BICARBONATE) 650 MG TAB    Take 2 Tablets by mouth 2 times a day.    SPIRIVA HANDIHALER 18 MCG CAP    Place 1 Capsule into inhaler and inhale  "every day.    ZINC SULFATE (ZINCATE) 220 (50 ZN) MG CAP    Take 1 Capsule by mouth every day.       ALLERGIES  Nkda [no known drug allergy]    PHYSICAL EXAM  VITAL SIGNS: BP (!) 127/90   Pulse 77   Temp 36.9 °C (98.4 °F) (Temporal)   Resp 17   Ht 1.702 m (5' 7\")   Wt 81.6 kg (180 lb)   LMP 06/02/2008   SpO2 98%   BMI 28.19 kg/m²   Pulse ox interpretation: I interpret this pulse ox as normal.  Constitutional: Alert in mild distress.  HENT: No signs of trauma, Bilateral external ears normal, Nose normal.   Eyes: Pupils are equal and reactive, Conjunctiva normal, Non-icteric.   Neck: Normal range of motion, Supple, No stridor.    Cardiovascular: Normal peripheral perfusion  Thorax & Lungs: Unlabored respirations, equal chest expansion, no accessory muscle use  Abdomen: Non-distended  Skin:  No erythema, No rash.   Back: Normal alignment and ROM  Extremities: No gross deformity  Musculoskeletal: Good range of motion in all major joints. Large left knee effusion. Well healed old surgical scars to the left knee. Knee has no obvious erythema but is diffusely warm. There is severe pain with even slight movement at the knee joint.  Neurologic: Alert, Normal motor function, No focal deficits noted.   Psychiatric: Affect normal, Judgment normal, Mood normal.     DIAGNOSTIC STUDIES & PROCEDURES    Labs:   Labs Reviewed   CBC WITH DIFFERENTIAL - Abnormal; Notable for the following components:       Result Value    WBC 3.7 (*)     RBC 3.40 (*)     Hemoglobin 9.4 (*)     Hematocrit 28.4 (*)     Platelet Count 95 (*)     Neutrophils-Polys 73.20 (*)     Lymphocytes 18.00 (*)     Lymphs (Absolute) 0.66 (*)     All other components within normal limits   BASIC METABOLIC PANEL - Abnormal; Notable for the following components:    Sodium 133 (*)     Co2 15 (*)     Glucose 162 (*)     Bun 40 (*)     Calcium 7.4 (*)     All other components within normal limits   CRP QUANTITIVE (NON-CARDIAC) - Abnormal; Notable for the following " components:    Stat C-Reactive Protein 8.62 (*)     All other components within normal limits   ESTIMATED GFR - Abnormal; Notable for the following components:    GFR (CKD-EPI) 48 (*)     All other components within normal limits   AMMONIA - Abnormal; Notable for the following components:    Ammonia 69 (*)     All other components within normal limits   SED RATE   FLUID CELL COUNT   IMMATURE PLT FRACTION   GRAM STAIN    Narrative:     L knee  L knee   FLUID CULTURE W/GRAM STAIN    Narrative:     L knee  L knee   DIAGNOSTIC ALCOHOL   POCT GLUCOSE DEVICE RESULTS     Radiology:   The attending emergency physician has independently interpreted the diagnostic imaging associated with this visit and am waiting the final reading from the radiologist.   Preliminary interpretation is a follows: No fracture.    Radiologist interpretation:   DX-KNEE COMPLETE 4+ LEFT   Final Result         1.  No radiographic evidence of acute injury.      2. Diffuse soft tissue swelling about the knee especially in the suprapatellar region suspicious for inflammation and/or infection.      3. Previous revision of left total knee replacement.        Arthrocentesis Procedure    Indication: Joint pain, joint swelling, and to obtain joint fluid for diagnostic testing    Consent: The patient provided verbal consent for this procedure.    Procedure: The left knee was positioned appropriately and the landmarks were identified.  Local anesthesia was obtained by infiltration using 5.0 cc of 1% Lidocaine without epinephrine.  The area was then prepped and draped in the usual sterile fashion.  A needle was then introduced into the joint space at which point 3 cc of turbid fluid was aspirated.  A joint injection was not performed.  The aspirated fluid was sent to the lab for cell count, gram stain, and culture and sensitivity.  A sterile dressing was then applied to the site.    The patient tolerated the procedure well.    Complications: None     COURSE &  MEDICAL DECISION MAKING     ED Observation Status? Yes; I am placing the patient in to an observation status due to a diagnostic uncertainty as well as therapeutic intensity. Patient placed in observation status at 3:45 PM, 1/2/2024.     Observation plan is as follows: Monitor for symptom management and diagnostic results.     Upon Reevaluation, the patient's condition has: not improved; and will be escalated to hospitalization.    Patient discharged from ED Observation status at 5:20 PM (Time) 1/2/2024 (Date).     INITIAL ASSESSMENT, COURSE AND PLAN  Care Narrative:     3:36 PM Patient presents to the ED with left sided knee pain. The patient reports she has been having pain and is unable to walk normally since being discharged from her skilled nursing facility a couple of days ago. Patient evaluated at bedside and discussed plan of care, including draining some fluid from the patient's knee and testing it to rule out septic arthritis. Ordered for DX-Knee complete 4+ left, CBC w/ diff, BMP, sed rate, and CRP quantitive (non-cardiac) to evaluate her symptoms. Differential diagnoses include but not limited to: concerning for septic arthritis, less likely fracture or surgical hardware disruption.    3:44 PM - I spoke with social work who informed me the patient's daughter reports the patient was discharged home from her skilled nursing facility on 12/16/23 and has no home services. She reports progressively worsening pain and difficulty walking.    4:16 PM - Patient was reevaluated at bedside. Arthrocentesis procedure was performed by me at this time as outlined above. Despite larger effusion on exam, only 3 cc of cloudy fluid aspirated from the left knee. Ordered for fluid cell count and fluid culture w/ gram stain to evaluate.    4:26 PM - I spoke with pharmacy regarding antibiotics selection. Ordered for cefepime 2 g IV based on most recent cultures and infectious disease consults.    5:20 PM - I discussed the  patient's case and the above findings with Dr. Russell (orthopedics) who will let Dr. Luz (orthopedics) know the patient is being admitted and they will see her in the hospital.  We agreed on antibiotic treatment and no emergent need for surgical intervention.    5:36 PM - I discussed the patient's case and the above findings with [facility] family medicine, who agrees to admit the patient.  We discussed the patient's history, presentation, laboratory findings, antibiotic treatment, and orthopedic consultation.    ADDITIONAL PROBLEM LIST  History of recurrent septic arthritis and infected surgical hardware complicates this visit.    DISPOSITION AND DISCUSSIONS  I have discussed management of the patient with the following physicians and CANDIDO's:  Dr. Russell (orthopedics), [facility] family medicine    Discussion of management with other [facility] or appropriate source(s): Pharmacy regarding antibiotic selection and Social Work to determine if the patient was discharged from her skilled nursing facility and whether or not she has home medical services.      DISPOSITION:  Patient will be hospitalized by Dr. Ohara (family medicine) in guarded condition.     FINAL DIAGNOSIS  1. Rule out septic arthritis    2. Cellulitis of left lower extremity          Wesley ARGUETA (Lisandro), am scribing for, and in the presence of, Ari Gamez M.D..    Electronically signed by: Wesley Mcleod (Lisandro), 1/2/2024    Ari ARGUETA M.D. personally performed the services described in this documentation, as scribed by Wesley Mcleod in my presence, and it is both accurate and complete.

## 2024-01-02 NOTE — ED NOTES
Patient transferred from [facility]. Patient transferred into bed. Changed into gown, erp at bedside.

## 2024-01-03 LAB
ALBUMIN SERPL BCP-MCNC: 2.4 G/DL (ref 3.2–4.9)
ALBUMIN/GLOB SERPL: 0.7 G/DL
ALP SERPL-CCNC: 146 U/L (ref 30–99)
ALT SERPL-CCNC: 63 U/L (ref 2–50)
ANION GAP SERPL CALC-SCNC: 10 MMOL/L (ref 7–16)
APTT PPP: 36.2 SEC (ref 24.7–36)
AST SERPL-CCNC: 78 U/L (ref 12–45)
BASOPHILS # BLD AUTO: 1.2 % (ref 0–1.8)
BASOPHILS # BLD: 0.04 K/UL (ref 0–0.12)
BILIRUB SERPL-MCNC: 0.8 MG/DL (ref 0.1–1.5)
BUN SERPL-MCNC: 34 MG/DL (ref 8–22)
CALCIUM ALBUM COR SERPL-MCNC: 9 MG/DL (ref 8.5–10.5)
CALCIUM SERPL-MCNC: 7.7 MG/DL (ref 8.5–10.5)
CHLORIDE SERPL-SCNC: 110 MMOL/L (ref 96–112)
CO2 SERPL-SCNC: 17 MMOL/L (ref 20–33)
CREAT SERPL-MCNC: 0.94 MG/DL (ref 0.5–1.4)
EOSINOPHIL # BLD AUTO: 0.1 K/UL (ref 0–0.51)
EOSINOPHIL NFR BLD: 3.1 % (ref 0–6.9)
ERYTHROCYTE [DISTWIDTH] IN BLOOD BY AUTOMATED COUNT: 45.4 FL (ref 35.9–50)
ETHANOL BLD-MCNC: <10.1 MG/DL
GFR SERPLBLD CREATININE-BSD FMLA CKD-EPI: 68 ML/MIN/1.73 M 2
GLOBULIN SER CALC-MCNC: 3.3 G/DL (ref 1.9–3.5)
GLUCOSE SERPL-MCNC: 104 MG/DL (ref 65–99)
HCT VFR BLD AUTO: 35.9 % (ref 37–47)
HGB BLD-MCNC: 11.8 G/DL (ref 12–16)
IMM GRANULOCYTES # BLD AUTO: 0.01 K/UL (ref 0–0.11)
IMM GRANULOCYTES NFR BLD AUTO: 0.3 % (ref 0–0.9)
INR PPP: 1.41 (ref 0.87–1.13)
LYMPHOCYTES # BLD AUTO: 0.81 K/UL (ref 1–4.8)
LYMPHOCYTES NFR BLD: 25.2 % (ref 22–41)
MCH RBC QN AUTO: 27.6 PG (ref 27–33)
MCHC RBC AUTO-ENTMCNC: 32.9 G/DL (ref 32.2–35.5)
MCV RBC AUTO: 83.9 FL (ref 81.4–97.8)
MONOCYTES # BLD AUTO: 0.26 K/UL (ref 0–0.85)
MONOCYTES NFR BLD AUTO: 8.1 % (ref 0–13.4)
NEUTROPHILS # BLD AUTO: 2 K/UL (ref 1.82–7.42)
NEUTROPHILS NFR BLD: 62.1 % (ref 44–72)
NRBC # BLD AUTO: 0 K/UL
NRBC BLD-RTO: 0 /100 WBC (ref 0–0.2)
PLATELET # BLD AUTO: 116 K/UL (ref 164–446)
PMV BLD AUTO: 10.4 FL (ref 9–12.9)
POTASSIUM SERPL-SCNC: 3.9 MMOL/L (ref 3.6–5.5)
PROT SERPL-MCNC: 5.7 G/DL (ref 6–8.2)
PROTHROMBIN TIME: 17.4 SEC (ref 12–14.6)
RBC # BLD AUTO: 4.28 M/UL (ref 4.2–5.4)
SODIUM SERPL-SCNC: 137 MMOL/L (ref 135–145)
WBC # BLD AUTO: 3.2 K/UL (ref 4.8–10.8)

## 2024-01-03 PROCEDURE — 85610 PROTHROMBIN TIME: CPT

## 2024-01-03 PROCEDURE — 82077 ASSAY SPEC XCP UR&BREATH IA: CPT

## 2024-01-03 PROCEDURE — 700102 HCHG RX REV CODE 250 W/ 637 OVERRIDE(OP)

## 2024-01-03 PROCEDURE — 700102 HCHG RX REV CODE 250 W/ 637 OVERRIDE(OP): Performed by: FAMILY MEDICINE

## 2024-01-03 PROCEDURE — 85730 THROMBOPLASTIN TIME PARTIAL: CPT

## 2024-01-03 PROCEDURE — 700111 HCHG RX REV CODE 636 W/ 250 OVERRIDE (IP): Mod: JZ

## 2024-01-03 PROCEDURE — A9270 NON-COVERED ITEM OR SERVICE: HCPCS | Performed by: FAMILY MEDICINE

## 2024-01-03 PROCEDURE — 85025 COMPLETE CBC W/AUTO DIFF WBC: CPT

## 2024-01-03 PROCEDURE — 700105 HCHG RX REV CODE 258

## 2024-01-03 PROCEDURE — A9270 NON-COVERED ITEM OR SERVICE: HCPCS

## 2024-01-03 PROCEDURE — 307059 PAD,EAR PROTECTOR: Performed by: FAMILY MEDICINE

## 2024-01-03 PROCEDURE — 80053 COMPREHEN METABOLIC PANEL: CPT

## 2024-01-03 PROCEDURE — 99222 1ST HOSP IP/OBS MODERATE 55: CPT | Mod: GC | Performed by: FAMILY MEDICINE

## 2024-01-03 PROCEDURE — 36415 COLL VENOUS BLD VENIPUNCTURE: CPT

## 2024-01-03 PROCEDURE — 770020 HCHG ROOM/CARE - TELE (206)

## 2024-01-03 RX ADMIN — TIOTROPIUM BROMIDE INHALATION SPRAY 5 MCG: 3.12 SPRAY, METERED RESPIRATORY (INHALATION) at 05:25

## 2024-01-03 RX ADMIN — GABAPENTIN 400 MG: 400 CAPSULE ORAL at 21:59

## 2024-01-03 RX ADMIN — CEFEPIME 2 G: 2 INJECTION, POWDER, FOR SOLUTION INTRAVENOUS at 22:06

## 2024-01-03 RX ADMIN — GABAPENTIN 400 MG: 400 CAPSULE ORAL at 05:18

## 2024-01-03 RX ADMIN — ACETAMINOPHEN 1000 MG: 500 TABLET ORAL at 22:00

## 2024-01-03 RX ADMIN — CEFEPIME 2 G: 2 INJECTION, POWDER, FOR SOLUTION INTRAVENOUS at 13:53

## 2024-01-03 RX ADMIN — ACETAMINOPHEN 1000 MG: 500 TABLET ORAL at 08:30

## 2024-01-03 RX ADMIN — FUROSEMIDE 20 MG: 20 TABLET ORAL at 05:18

## 2024-01-03 RX ADMIN — DOCUSATE SODIUM 50 MG AND SENNOSIDES 8.6 MG 2 TABLET: 8.6; 5 TABLET, FILM COATED ORAL at 05:25

## 2024-01-03 RX ADMIN — POTASSIUM CHLORIDE 10 MEQ: 750 TABLET, EXTENDED RELEASE ORAL at 05:19

## 2024-01-03 RX ADMIN — LOSARTAN POTASSIUM 100 MG: 50 TABLET, FILM COATED ORAL at 05:18

## 2024-01-03 RX ADMIN — CEFEPIME 2 G: 2 INJECTION, POWDER, FOR SOLUTION INTRAVENOUS at 05:38

## 2024-01-03 RX ADMIN — ATORVASTATIN CALCIUM 20 MG: 20 TABLET, FILM COATED ORAL at 21:59

## 2024-01-03 RX ADMIN — FERROUS SULFATE TAB 325 MG (65 MG ELEMENTAL FE) 325 MG: 325 (65 FE) TAB at 05:18

## 2024-01-03 RX ADMIN — OMEPRAZOLE 20 MG: 20 CAPSULE, DELAYED RELEASE ORAL at 05:18

## 2024-01-03 RX ADMIN — LACTULOSE 15 ML: 20 SOLUTION ORAL at 05:18

## 2024-01-03 RX ADMIN — GABAPENTIN 400 MG: 400 CAPSULE ORAL at 13:53

## 2024-01-03 RX ADMIN — METOPROLOL SUCCINATE 25 MG: 25 TABLET, EXTENDED RELEASE ORAL at 21:59

## 2024-01-03 ASSESSMENT — COGNITIVE AND FUNCTIONAL STATUS - GENERAL
TOILETING: A LOT
DRESSING REGULAR UPPER BODY CLOTHING: A LOT
SUGGESTED CMS G CODE MODIFIER MOBILITY: CL
EATING MEALS: A LITTLE
SUGGESTED CMS G CODE MODIFIER DAILY ACTIVITY: CK
MOVING TO AND FROM BED TO CHAIR: A LOT
DRESSING REGULAR LOWER BODY CLOTHING: A LOT
TURNING FROM BACK TO SIDE WHILE IN FLAT BAD: A LOT
HELP NEEDED FOR BATHING: A LOT
DAILY ACTIVITIY SCORE: 14
CLIMB 3 TO 5 STEPS WITH RAILING: TOTAL
MOVING FROM LYING ON BACK TO SITTING ON SIDE OF FLAT BED: A LOT
PERSONAL GROOMING: A LITTLE
MOBILITY SCORE: 10
STANDING UP FROM CHAIR USING ARMS: A LOT
WALKING IN HOSPITAL ROOM: TOTAL

## 2024-01-03 ASSESSMENT — PATIENT HEALTH QUESTIONNAIRE - PHQ9
2. FEELING DOWN, DEPRESSED, IRRITABLE, OR HOPELESS: NOT AT ALL
SUM OF ALL RESPONSES TO PHQ9 QUESTIONS 1 AND 2: 0
1. LITTLE INTEREST OR PLEASURE IN DOING THINGS: NOT AT ALL
2. FEELING DOWN, DEPRESSED, IRRITABLE, OR HOPELESS: NOT AT ALL
SUM OF ALL RESPONSES TO PHQ9 QUESTIONS 1 AND 2: 0
1. LITTLE INTEREST OR PLEASURE IN DOING THINGS: NOT AT ALL

## 2024-01-03 ASSESSMENT — PAIN DESCRIPTION - PAIN TYPE: TYPE: ACUTE PAIN

## 2024-01-03 ASSESSMENT — FIBROSIS 4 INDEX: FIB4 SCORE: 5.34

## 2024-01-03 NOTE — ED NOTES
Bedside report received from off going RN: Myranda, assumed care of patient.  POC discussed with patient. Call light within reach, all needs addressed at this time.       Fall risk interventions in place: Move the patient closer to the nurse's station, Patient's personal possessions are with in their safe reach, Place fall risk sign on patient's door, and Keep floor surfaces clean and dry (all applicable per [place] Fall risk assessment)   Continuous monitoring: Cardiac Leads, Pulse Ox, or Blood Pressure  IVF/IV medications: Not Applicable   Oxygen: Room Air  Bedside sitter: Not Applicable   Isolation: Not Applicable

## 2024-01-03 NOTE — H&P
FAMILY MEDICINE HISTORY AND PHYSICAL       PATIENT ID:  NAME:  April Alicia  MRN:               [id number]  YOB: 1960    Date of Admission: 1/2/2024     Attending: Russell Ohara M.d.     Resident: Marion Cruz M.D.    Primary Care Physician:  Anum Gatica M.D.    CC:    Chief Complaint   Patient presents with    Knee Pain     Lt       Fatigue       HPI: April Alicia is a 63 y.o. female with a PMHx of alcoholic cirrhosis, left knee replacement status post revision, septic arthritis of left knee, diet-controlled type 2 diabetes, rate controlled atrial fibrillation not on anticoagulation, who presented with a warm swollen left knee.     Patient is not oriented to recall full history. She does report pain and swelling to the left knee over the past number of days. Also reports chills. Uncertain of timeline of this episode of knee swelling and pain. Uncertain of medication treatment history. Reports her Orthopedic Surgeon is Dr. Luz. States she is no longer living in SNF and is living at home.     Patient denies abdominal pain/swelling, nausea, fever, cough, SOB, pain with inspiration.     Called daughter Ava for collateral. Patient's daughter states when patient gets sick she will get confused. States patient thought tonight was New Year's Angle. Patient's daughter states after surgery in early November 2023 patient coded 3 times so she is nervous about any further surgical intervention.     ERCourse:  In ED patient vitals within normal limits  CBC with white count of 3.7 thousand with left shift, hemoglobin of 9.4, platelets of 95  BMP with sodium of 133, bicarb of 15, elevated BUN to creatinine ratio with a elevated creatinine of 1.26  CRP of 8.62 elevated  Knee aspirate with over 19,000 nucleated cells, 100,000 red blood cells, 98% neutrophils  Gram stain pending    Patient treated with cefepime 2 g  ERP reached out to Dr. Russell from [facility] who will let Natty know  patient is admitted and will see patient    REVIEW OF SYSTEMS:   Ten systems reviewed and were negative except as noted in the HPI.                PAST MEDICAL HISTORY:   has a past medical history of Arrhythmia, Arthritis, ASTHMA, Blood clotting disorder (HCC) (2018), Dental disorder, Diabetes, Emphysema of lung (HCC), Heart abnormality, Heart burn, Heart valve disease, Hypertension, Infection (9/4/2017), Infection (2018), Liver disease, Pneumonia (02/2020), and Seizure disorder (HCC).     PAST SURGICAL HISTORY:   has a past surgical history that includes gyn surgery (1982); gyn surgery (2003); colonoscopy with clipping (10/28/2015); colonoscopy with sclerotherapy (10/28/2015); colonoscopy with tattooing (10/28/2015); irrigation & debridement ortho (Right, 9/3/2017); knee arthroplasty total (Right, 2018); pr total knee arthroplasty (Left, 8/24/2020); irrigation & debridement general (Left, 7/17/2022); pr revise knee joint replace,all parts (Left, 7/19/2022); pr revise knee joint replace,all parts (Left, 12/28/2022); irrigation & debridement general (Left, 12/28/2022); and pr total knee arthroplasty (Left, 10/30/2023).     FAMILY HISTORY:  family history is not on file.     SOCIAL HISTORY:   Employment: Unable to assess  Living Situation: Reports lives independently with 1 friend  Smoking: Former  Etoh: Denies  Recreational Drug: Denies    DIET:   Orders Placed This Encounter   Procedures    Diet Order Diet: Regular     Standing Status:   Standing     Number of Occurrences:   1     Order Specific Question:   Diet:     Answer:   Regular [1]    Diet NPO Restrict to: Sips with Medications     Standing Status:   Standing     Number of Occurrences:   8     Order Specific Question:   Diet NPO Restrict to:     Answer:   Sips with Medications [3]       ALLERGIES:  Allergies   Allergen Reactions    Nkda [No Known Drug Allergy]        OUTPATIENT MEDICATIONS:    Current Facility-Administered Medications:     senna-docusate  (Pericolace Or Senokot S) 8.6-50 MG per tablet 2 Tablet, 2 Tablet, Oral, BID, 2 Tablet at 01/02/24 1912 **AND** polyethylene glycol/lytes (Miralax) Packet 1 Packet, 1 Packet, Oral, QDAY PRN **AND** magnesium hydroxide (Milk Of Magnesia) suspension 30 mL, 30 mL, Oral, QDAY PRN **AND** bisacodyl (Dulcolax) suppository 10 mg, 10 mg, Rectal, QDAY PRN, Tiny Galvan M.D.    labetalol (Normodyne/Trandate) injection 10 mg, 10 mg, Intravenous, Q4HRS PRN, Tiny Galvan M.D.    atorvastatin (Lipitor) tablet 20 mg, 20 mg, Oral, Nightly, Marion Cruz M.D.    [START ON 1/3/2024] furosemide (Lasix) tablet 20 mg, 20 mg, Oral, DAILY, Marion Cruz M.D.    metoprolol SR (Toprol XL) tablet 25 mg, 25 mg, Oral, Nightly, Mairon Cruz M.D.    [START ON 1/3/2024] potassium chloride ER (Klor-Con) tablet 10 mEq, 10 mEq, Oral, DAILY, Marion Cruz M.D.    [START ON 1/3/2024] cefepime (Maxipime) 2 g in  mL IVPB, 2 g, Intravenous, Q12HR, Marion Cruz M.D.    acetaminophen (Tylenol) tablet 1,000 mg, 1,000 mg, Oral, Q6HRS PRN **OR** acetaminophen (Tylenol) suppository 650 mg, 650 mg, Rectal, Q6HRS PRN, Marion Cruz M.D.    [START ON 1/3/2024] omeprazole (PriLOSEC) capsule 20 mg, 20 mg, Oral, DAILY, Marion Cruz M.D.    [START ON 1/3/2024] tiotropium (Spiriva Respimat) 2.5 mcg/Act inhalation spray 5 mcg, 5 mcg, Inhalation, DAILY, Russell Ohara M.D.    [START ON 1/3/2024] ferrous sulfate tablet 325 mg, 325 mg, Oral, DAILY, Marion Cruz M.D.    gabapentin (Neurontin) capsule 400 mg, 400 mg, Oral, Q8HRS, Marion Cruz M.D.    [START ON 1/3/2024] losartan (Cozaar) tablet 100 mg, 100 mg, Oral, DAILY, Marion Cruz M.D.    [START ON 1/3/2024] lactulose 20 GM/30ML solution 15 mL, 15 mL, Oral, DAILY, Marion Cruz M.D.    Current Outpatient Medications:     ferrous sulfate 325 (65 Fe) MG tablet, Take 325 mg by mouth every day., Disp: , Rfl:     losartan (COZAAR) 100 MG Tab,  Take 100 mg by mouth every day., Disp: , Rfl:     gabapentin (NEURONTIN) 400 MG Cap, Take 400 mg by mouth 3 times a day., Disp: , Rfl:     oxyCODONE immediate release (ROXICODONE) 10 MG immediate release tablet, Take 10 mg by mouth every four hours as needed for Moderate Pain., Disp: , Rfl:     metoprolol SR (TOPROL XL) 25 MG TABLET SR 24 HR, Take 1 Tablet by mouth every evening., Disp: 30 Tablet, Rfl: 11    potassium chloride (MICRO-K) 10 MEQ capsule, Take 1 Capsule by mouth every day., Disp: 90 Capsule, Rfl: 3    furosemide (LASIX) 20 MG Tab, Take 1 Tablet by mouth every day., Disp: 90 Tablet, Rfl: 3    pantoprazole (PROTONIX) 20 MG tablet, Take 20 mg by mouth every day., Disp: , Rfl:     atorvastatin (LIPITOR) 20 MG Tab, Take 1 Tablet by mouth every evening., Disp: 90 Tablet, Rfl: 3    SPIRIVA HANDIHALER 18 MCG Cap, Place 1 Capsule into inhaler and inhale every day., Disp: , Rfl:     PHYSICAL EXAM:  Vitals:    24 1701 24 1801 24 1901 24   BP: 123/69 122/71 128/70 135/73   Pulse: 76 78 88 83   Resp:       Temp:       TempSrc:       SpO2: 97% 96% 97% 98%   Weight:       Height:       , Temp (24hrs), Av.9 °C (98.4 °F), Min:36.9 °C (98.4 °F), Max:36.9 °C (98.4 °F)  , Pulse Oximetry: 98 %, O2 (LPM): 0    Physical Exam  Constitutional:       General: She is not in acute distress.     Appearance: She is not ill-appearing, toxic-appearing or diaphoretic.   HENT:      Head: Normocephalic and atraumatic.      Right Ear: External ear normal.      Left Ear: External ear normal.      Nose: Nose normal. No congestion.      Mouth/Throat:      Mouth: Mucous membranes are moist.      Pharynx: No oropharyngeal exudate.   Eyes:      General: No scleral icterus.     Extraocular Movements: Extraocular movements intact.   Cardiovascular:      Rate and Rhythm: Normal rate and regular rhythm.      Pulses: Normal pulses.      Heart sounds: No murmur heard.     No friction rub. No gallop.   Pulmonary:       "Effort: Pulmonary effort is normal. No respiratory distress.      Breath sounds: Normal breath sounds. No wheezing.   Abdominal:      General: Abdomen is flat. There is no distension.      Palpations: Abdomen is soft. There is no mass.      Tenderness: There is no abdominal tenderness. There is no right CVA tenderness, left CVA tenderness, guarding or rebound.   Musculoskeletal:         General: Swelling, tenderness and deformity present.      Cervical back: Normal range of motion. No rigidity or tenderness.      Comments: Left knee swollen, red, warm to the touch, tender to palpation.  With reduced range of motion.  Good distal pulses.  No calf tenderness to palpation.  Bilateral equal lower extremity edema without warmth redness or palpable cord   Lymphadenopathy:      Cervical: No cervical adenopathy.   Skin:     General: Skin is warm.      Coloration: Skin is not jaundiced.   Neurological:      Mental Status: She is alert. She is disoriented.      Comments: Alert and oriented x 2           LAB TESTS:   Recent Labs     01/02/24  1626   WBC 3.7*   RBC 3.40*   HEMOGLOBIN 9.4*   HEMATOCRIT 28.4*   MCV 83.5   MCH 27.6   MCHC 33.1   RDW 45.0   PLATELETCT 95*   MPV 9.8     Recent Labs     01/02/24  1626   SODIUM 133*   POTASSIUM 3.7   CHLORIDE 106   CO2 15*   GLUCOSE 162*   BUN 40*   CREATININE 1.26   CALCIUM 7.4*     Recent Labs     01/02/24  1626   GLUCOSE 162*         No results for input(s): \"NTPROBNP\" in the last 72 hours.      No results for input(s): \"TROPONINT\" in the last 72 hours.    CULTURES:   Results       Procedure Component Value Units Date/Time    GRAM STAIN [[id number]] Collected: 01/02/24 1615    Order Status: Completed Specimen: Synovial Updated: 01/02/24 1834     Significant Indicator .     Source SYNO     Site Left Knee     Gram Stain Result Many WBCs.  No organisms seen.      Narrative:      L knee  L knee    FLUID CULTURE W/GRAM STAIN [[id number]] Collected: 01/02/24 1615    Order Status: Sent " Specimen: Synovial Fluid Updated: 01/02/24 1834     Significant Indicator NEG     Source SYNO     Site Left Knee     Culture Result -     Gram Stain Result Many WBCs.  No organisms seen.      Narrative:      L knee  L knee            IMAGES:  DX-KNEE COMPLETE 4+ LEFT   Final Result         1.  No radiographic evidence of acute injury.      2. Diffuse soft tissue swelling about the knee especially in the suprapatellar region suspicious for inflammation and/or infection.      3. Previous revision of left total knee replacement.          CONSULTS:   Orthopedic surgery    ASSESSMENT/PLAN:   63 y.o. female admitted for septic arthritis and altered mental status    I anticipate this patient will require at least two midnights for appropriate medical management, necessitating inpatient admission because septic arthritis with altered mental status and concern for early sepsis    Patient will need a Telemetry bed secondary to septic arthritis with altered mental status and concern for early sepsis      * Septic arthritis (HCC)- (present on admission)  Assessment & Plan  Patient with history of septic arthritis in L knee with history of surgery to affected joint. S/p 6 week course of cefepime EOT 12/11. Plan was for possible aspiration of joint following course of antibiotics and consideration of further antibiotics versus surgical management. Joint aspirated in ED. Cell count with ~20,000 nucleated cells (98% polys), 100k RBC, initial gram stain with WBCs no organisms.  Patient with 0 out of 4 SIRS criteria though does have evidence of endorgan dysfunction with elevated creatinine and altered mental status.  These are most likely secondary to poor p.o. intake and underlying cirrhosis though cannot rule out early sepsis.  Antibiotics administered in ED prior to blood cultures.  -Cefepime 2g q8h   -Orthopedic surgery to see patient in AM, patient NPO at midnight and medication ppx held for surgical consideration   -ID when  culture results for definitive recs, followed by DANYELL Baez   -Reach out to patient's daughter for collateral in AM   -As needed pain medication ordered, will attempt to avoid opiates if possible given altered mental status  -If patient develops fever or hemodynamic instability will proceed with full sepsis workup    AMS (altered mental status)- (present on admission)  Assessment & Plan  Patient oriented to person and event, not place or time. Per chart review, patient noted to be A&Ox4 last month representing a change from baseline. Likely hepatic encephalopathy 2/2 septic arthritis.  Elevated ammonia to 69 is not diagnostic however it does increase suspicion for hepatic encephalopathy.  Unlikely SBP given lack of fever, abdominal pain, ascites. Covered with cefepime. Patient with mild hyponatremia otherwise no electrolyte abnormality on CMP.   -Home lactulose ordered, titrate to 2-3 soft BM per day with concern for hepatic encephalopathy   -Treating septic arthritis   -Continue to monitor electrolytes and replete as indicated  -Blood alcohol level ordered    GERD (gastroesophageal reflux disease)- (present on admission)  Assessment & Plan  - Home PPI    Hyperlipidemia- (present on admission)  Assessment & Plan  - Home statin    ELIZABETH (acute kidney injury) (HCC)- (present on admission)  Assessment & Plan  Cr elevated to 1.26 from baseline that ranges from 1.6-1.  Elevated BUN to creatinine ratio.  Likely prerenal in the setting of altered mental status and poor p.o. intake.  Patient with some bilateral lower extremity edema, without appreciable ascites and lungs are clear to auscultation.  Patient has echocardiogram from 1 year ago with near normal results.  Will dose fluids gently in setting of known cirrhosis.  -Gentle bolus to effect  - Continue to monitor labs and fluid status    Paroxysmal atrial fibrillation, hx of- (present on admission)  Assessment & Plan  Patient on metoprolol, not on  anticoagulation likely due to underlying coagulopathy in setting of cirrhosis.  - Home metoprolol    Hypertension- (present on admission)  Assessment & Plan  BP within JNC see 9 goals in the hospital.  - Home losartan  - As needed antihypertensives ordered    Controlled type 2 diabetes mellitus with hyperglycemia, without long-term current use of insulin (HCC)- (present on admission)  Assessment & Plan  Patient with a history of type 2 diabetes.  Most recent A1c in the normal range at 5.4 in January 2023.  Not on outpatient medication.  Patient currently altered and not tolerating p.o. intake.  - Continue to monitor blood glucose on BMP, will consider initiating sliding scale insulin if sugars are elevated    Cirrhosis, alcoholic (HCC)- (present on admission)  Assessment & Plan  History of alcoholic cirrhosis. Most recent MELD-NA on 10/23/23 of 19, 6% 3-month mortality risk.  Likely has altered mental status secondary to hepatic encephalitis in the setting of septic arthritis  -Home Lasix and potassium  -Lactulose daily ordered at her home dosing.  See altered mental status  -PT/PTT ordered for preoperative evaluation as well as risk stratification      Core Measures:  Fluids: NS bolus  Lines: PIV  Abx: Cefepime  Diet: Regular  PPX: SCDs/TEDs and pharmacologic prophylaxis contraindicated due to consideration for surgical intervention    CODE Status: Full Code

## 2024-01-03 NOTE — ED NOTES
Patient resting in bed, vss on ra, updated on poc, gurney in lowest and locked position with call light in reach.

## 2024-01-03 NOTE — ASSESSMENT & PLAN NOTE
Patient with a history of type 2 diabetes.  Most recent A1c in the normal range at 5.4 in January 2023.  Not on outpatient medication.  Patient currently altered, pending SLP eval.  - Continue to monitor blood glucose on BMP, will consider initiating sliding scale insulin if sugars are elevated  - 1/3 AM glucose 104  - 1/5 AM glucose 118  - No need for additional monitoring unless signs of hypo/hyperglycemia present

## 2024-01-03 NOTE — ASSESSMENT & PLAN NOTE
History of alcoholic cirrhosis. MELD-NA 1/8 is 10, <2% 3-month mortality risk.  INR 1.4 on admission, 1.41 on 1/7.  AMS present on admission has resolved.  -Home Lasix and potassium  -Lactulose daily ordered at her home dosing.  See #altered mental status

## 2024-01-03 NOTE — PROGRESS NOTES
FAMILY MEDICINE PROGRESS NOTE          PATIENT ID:  NAME:  April Alicia   MRN:               [id number]  YOB: 1960    Date of Admission: 1/2/2024     Attending: Russell Ohara M.d.     Resident: Jerome Molina, Student (MS3)    Primary Care Physician:  Anum Gatica M.D.    HPI: April Alicia is a 63 y.o. female with a history of T2DM, afib, L knee replacement with septic arthritis, and alcoholic cirrhosis admitted for L knee septic arthritis on hospital day 1.     Interval Problem Update  Pending cultures. Many WBCs on aspirate. No organisms found to date.    SUBJECTIVE:   No acute events overnight. Last BM: last night/this morning. Denies N/V, constipation. Denies current pain. States she feels warm.    OBJECTIVE:  Temp:  [36.5 °C (97.7 °F)-36.9 °C (98.4 °F)] 36.8 °C (98.2 °F)  Pulse:  [74-99] 99  Resp:  [14-20] 14  BP: (122-147)/(69-96) 140/93  SpO2:  [94 %-99 %] 97 %    No intake or output data in the 24 hours ending 01/03/24 0629    PHYSICAL EXAM:  Physical Exam  Vitals reviewed.   Constitutional:       General: She is not in acute distress.     Appearance: Normal appearance. She is not toxic-appearing.      Comments: Elderly female, sitting in hospital bed   HENT:      Head: Normocephalic.      Right Ear: External ear normal.      Left Ear: External ear normal.      Nose: Nose normal.      Mouth/Throat:      Mouth: Mucous membranes are moist.   Eyes:      Extraocular Movements: Extraocular movements intact.      Pupils: Pupils are equal, round, and reactive to light.   Cardiovascular:      Rate and Rhythm: Normal rate. Rhythm irregular.      Heart sounds: No murmur heard.     Comments: Jugular venous distention  Pulmonary:      Effort: Pulmonary effort is normal.      Breath sounds: Normal breath sounds. No wheezing or rales.   Abdominal:      General: Abdomen is flat. Bowel sounds are normal. There is no distension.      Palpations: Abdomen is soft.      Tenderness:  There is no abdominal tenderness. There is no guarding.   Musculoskeletal:         General: No swelling. Normal range of motion.   Skin:     General: Skin is warm and dry.      Capillary Refill: Capillary refill takes less than 2 seconds.      Findings: No rash.   Neurological:      General: No focal deficit present.      Mental Status: She is alert and oriented to person, place, and time.   Psychiatric:         Mood and Affect: Mood normal.         Behavior: Behavior is slowed.         Cognition and Memory: Cognition is impaired.         LABS:  Recent Labs     01/02/24 1626 01/03/24 0222   WBC 3.7* 3.2*   RBC 3.40* 4.28   HEMOGLOBIN 9.4* 11.8*   HEMATOCRIT 28.4* 35.9*   MCV 83.5 83.9   MCH 27.6 27.6   RDW 45.0 45.4   PLATELETCT 95* 116*   MPV 9.8 10.4   NEUTSPOLYS 73.20* 62.10   LYMPHOCYTES 18.00* 25.20   MONOCYTES 6.60 8.10   EOSINOPHILS 1.40 3.10   BASOPHILS 0.50 1.20     Recent Labs     01/02/24 1626 01/03/24 0222   SODIUM 133* 137   POTASSIUM 3.7 3.9   CHLORIDE 106 110   CO2 15* 17*   BUN 40* 34*   CREATININE 1.26 0.94   CALCIUM 7.4* 7.7*   ALBUMIN  --  2.4*     Estimated GFR/CRCL = Estimated Creatinine Clearance: 65.7 mL/min (by C-G formula based on SCr of 0.94 mg/dL).  Recent Labs     01/02/24 1626 01/03/24 0222   GLUCOSE 162* 104*     Recent Labs     01/02/24 1953 01/03/24 0222   ASTSGOT  --  78*   ALTSGPT  --  63*   TBILIRUBIN  --  0.8   ALKPHOSPHAT  --  146*   GLOBULIN  --  3.3   INR  --  1.41*   AMMONIA 69*  --              Recent Labs     01/03/24 0222   INR 1.41*   APTT 36.2*         IMAGING:  DX-KNEE COMPLETE 4+ LEFT   Final Result         1.  No radiographic evidence of acute injury.      2. Diffuse soft tissue swelling about the knee especially in the suprapatellar region suspicious for inflammation and/or infection.      3. Previous revision of left total knee replacement.          CULTURES:   Results       Procedure Component Value Units Date/Time    GRAM STAIN [[id number]] Collected:  01/02/24 1615    Order Status: Completed Specimen: Synovial Updated: 01/02/24 1834     Significant Indicator .     Source SYNO     Site Left Knee     Gram Stain Result Many WBCs.  No organisms seen.      Narrative:      L knee  L knee    FLUID CULTURE W/GRAM STAIN [[id number]] Collected: 01/02/24 1615    Order Status: Sent Specimen: Synovial Fluid Updated: 01/02/24 1834     Significant Indicator NEG     Source SYNO     Site Left Knee     Culture Result -     Gram Stain Result Many WBCs.  No organisms seen.      Narrative:      L knee  L knee            MEDS:  Current Facility-Administered Medications   Medication Last Admin    senna-docusate (Pericolace Or Senokot S) 8.6-50 MG per tablet 2 Tablet 2 Tablet at 01/03/24 0525    And    polyethylene glycol/lytes (Miralax) Packet 1 Packet      And    magnesium hydroxide (Milk Of Magnesia) suspension 30 mL      And    bisacodyl (Dulcolax) suppository 10 mg      labetalol (Normodyne/Trandate) injection 10 mg      atorvastatin (Lipitor) tablet 20 mg      furosemide (Lasix) tablet 20 mg 20 mg at 01/03/24 0518    metoprolol SR (Toprol XL) tablet 25 mg      potassium chloride ER (Klor-Con) tablet 10 mEq 10 mEq at 01/03/24 0519    cefepime (Maxipime) 2 g in  mL IVPB 2 g at 01/03/24 0538    acetaminophen (Tylenol) tablet 1,000 mg      Or    acetaminophen (Tylenol) suppository 650 mg      omeprazole (PriLOSEC) capsule 20 mg 20 mg at 01/03/24 0518    tiotropium (Spiriva Respimat) 2.5 mcg/Act inhalation spray 5 mcg 5 mcg at 01/03/24 0525    ferrous sulfate tablet 325 mg 325 mg at 01/03/24 0518    gabapentin (Neurontin) capsule 400 mg 400 mg at 01/03/24 0518    losartan (Cozaar) tablet 100 mg 100 mg at 01/03/24 0518    lactulose 20 GM/30ML solution 15 mL 15 mL at 01/03/24 0518       ASSESSMENT/PLAN:  63 y.o. female admitted for L knee septic arthritis.   #Septic arthritis: Pending cultures. Show far has shown many WBCs without organisms present. Awaiting ortho consult for  further evaluation of surgery.   #Altered mental status: Upon admission, patient was A&O x 2, as she was not oriented to time. Daughter previously stated on chart review that her mother will become confused upon admission. This is likely not an acute episode of hepatic encephalopathy as she is now A&O x 4. This may have been an episode of delirium, which can fluctuate the alertness and orientation.   #T2DM, HTN, Hyperlipidemia, afib  -Continue regimen as prescribed.     Core Measures:  Fluids: 0.9NS  Lines: Peripheral IV for intravenous access  Abx: Cefepime  Diet:  NPO  PPX: SCDs/TEDs    CODE Status: Full Code      Disposition  Patient is not medically cleared for discharge.   Anticipate discharge to an inpatient rehabilitation hospital.  I have placed the appropriate orders for post-discharge needs.    I have personally seen and examined the patient at bedside. I discussed the plan of care with Caty Ohara M.d..      Jerome Molina, Student   MS3  [employer]

## 2024-01-03 NOTE — DISCHARGE PLANNING
Case Management Discharge Planning    Admission Date: 1/2/2024  GMLOS: 6.1  ALOS: 1    6-Clicks ADL Score: 14  6-Clicks Mobility Score: 10  PT and/or OT Eval ordered: Yes  Post-acute Referrals Ordered: No  Post-acute Choice Obtained: No  Has referral(s) been sent to post-acute provider:  No      Anticipated Discharge Dispo: Discharge Disposition: Discharged to home/self care (01)    DME Needed: No    Action(s) Taken: Updated Provider/Nurse on Discharge Plan and DC Assessment Complete (See below)    Escalations Completed: None    Medically Clear: No    Next Steps: Awaiting Ortho consult for plans.    Barriers to Discharge: Medical clearance    Is the patient up for discharge tomorrow: No

## 2024-01-03 NOTE — PROGRESS NOTES
4 Eyes Skin Assessment Completed by DANYEL Candelaria and DAYNEL Mcdaniel.    Head WDL  Ears WDL  Nose WDL  Mouth WDL  Neck WDL  Breast/Chest WDL  Shoulder Blades WDL  Spine WDL  (R) Arm/Elbow/Hand Redness and Blanching  (L) Arm/Elbow/Hand Redness and Blanching  Abdomen WDL  Groin WDL  Scrotum/Coccyx/Buttocks Redness and Blanching  (R) Leg Redness and Discoloration, Scar to Knee  (L) Leg Redness and Discoloration, Scar to Knee  (R) Heel/Foot/Toe Redness, Blanching, and Edema  (L) Heel/Foot/Toe Redness, Blanching, and Edema          Devices In Places Tele Box, Blood Pressure Cuff, and Pulse Ox      Interventions In Place Pillows and Q2 Turns    Possible Skin Injury No    Pictures Uploaded Into Epic N/A  Wound Consult Placed N/A  RN Wound Prevention Protocol Ordered Yes

## 2024-01-03 NOTE — ED NOTES
Rounded on pt. Labs drawn and sent.  Pt requesting pain medication for 8/10 knee pain. MD notified.

## 2024-01-03 NOTE — ED NOTES
Patient placed on bedpan. Bed pan leaked on bed. Patient cleaned and provided with new linens and blankets.

## 2024-01-03 NOTE — CARE PLAN
The patient is Stable - Low risk of patient condition declining or worsening    Shift Goals  Clinical Goals: Safety  Patient Goals: comfort, rest  Family Goals: EDMUND    Progress made toward(s) clinical / shift goals:    Problem: Pain - Standard  Goal: Alleviation of pain or a reduction in pain to the patient’s comfort goal  Outcome: Progressing     Problem: Knowledge Deficit - Standard  Goal: Patient and family/care givers will demonstrate understanding of plan of care, disease process/condition, diagnostic tests and medications  Outcome: Progressing     Problem: Hemodynamics  Goal: Patient's hemodynamics, fluid balance and neurologic status will be stable or improve  Outcome: Progressing     Problem: Fluid Volume  Goal: Fluid volume balance will be maintained  Outcome: Progressing     Problem: Urinary - Renal Perfusion  Goal: Ability to achieve and maintain adequate renal perfusion and functioning will improve  Outcome: Progressing     Problem: Respiratory  Goal: Patient will achieve/maintain optimum respiratory ventilation and gas exchange  Outcome: Progressing     Problem: Skin Integrity  Goal: Skin integrity is maintained or improved  Outcome: Progressing     Problem: Fall Risk  Goal: Patient will remain free from falls  Outcome: Progressing       Patient is not progressing towards the following goals:

## 2024-01-03 NOTE — RESPIRATORY CARE
COPD EDUCATION by COPD CLINICAL EDUCATOR  1/3/2024 at 8:35 AM by Sun Jones, RRT     Patient reviewed by COPD education team. Patient does not have a history or diagnosis of COPD and quit smoking in 2016.  Therefore, patient does not qualify for the COPD program.

## 2024-01-03 NOTE — PROGRESS NOTES
FAMILY MEDICINE PROGRESS NOTE          PATIENT ID:  NAME:  April Alicia  MRN:               [id number]  YOB: 1960    Date of Admission: 1/2/2024     Attending: Russell Ohara M.d.     Resident: Rod Dunn M.D. (PGY-1)    Primary Care Physician:  Anum Gatica M.D.    HPI: April Alicia is a 63 y.o. female with PMHx of alcoholic cirrhosis, left knee replacement status post revision, septic arthritis of left knee, diet-controlled type 2 diabetes, rate controlled atrial fibrillation not on anticoagulation, admitted for septic arthritis on hospital day 1    Interval Problem Update  1/2: Admitted for orthopedic evaluation of septic arthritis, AMS.  Started on IV cefepime.    SUBJECTIVE:   No acute events overnight, patient's mentation status seems to have improved overnight.  Patient responding to nursing and physician staff, appears oriented though hard of hearing.  No concerns at this time with regards to speech or dysphagia from nursing staff.    Patient complaining of pain in her left knee, unchanged from admission.  Awaiting evaluation by orthopedic surgery today.    OBJECTIVE:  Temp:  [36.5 °C (97.7 °F)-36.9 °C (98.4 °F)] 36.9 °C (98.4 °F)  Pulse:  [74-99] 92  Resp:  [14-20] 16  BP: (122-147)/(69-96) 134/87  SpO2:  [94 %-99 %] 96 %    No intake or output data in the 24 hours ending 01/03/24 0743    PHYSICAL EXAM:  Physical Exam  Vitals reviewed.   Constitutional:       General: She is not in acute distress.     Appearance: Normal appearance. She is normal weight. She is not toxic-appearing.   HENT:      Head: Normocephalic and atraumatic.      Right Ear: External ear normal. Decreased hearing noted.      Left Ear: External ear normal. Decreased hearing noted.      Nose: Nose normal. No congestion.      Mouth/Throat:      Mouth: Mucous membranes are moist.      Pharynx: Oropharynx is clear.   Eyes:      Extraocular Movements: Extraocular movements intact.      Pupils: Pupils  are equal, round, and reactive to light.   Neck:      Vascular: JVD present.   Cardiovascular:      Rate and Rhythm: Normal rate. Rhythm irregular.      Heart sounds: No murmur heard.  Pulmonary:      Effort: Pulmonary effort is normal. No respiratory distress.      Breath sounds: Normal breath sounds.   Abdominal:      General: Abdomen is flat. Bowel sounds are normal. There is no distension.      Palpations: Abdomen is soft.      Tenderness: There is no abdominal tenderness. There is no guarding or rebound.   Musculoskeletal:         General: Swelling present.      Cervical back: Normal range of motion and neck supple.      Comments: Swelling of the left knee with associated redness, tenderness.  Surgical scar in place, appears well-approximated and well-healing, no signs of infection at surgical site.   Skin:     General: Skin is warm and dry.      Capillary Refill: Capillary refill takes less than 2 seconds.      Comments: Old skin grafts present on BLE   Neurological:      General: No focal deficit present.      Mental Status: She is alert and oriented to person, place, and time.      Cranial Nerves: No cranial nerve deficit.   Psychiatric:         Mood and Affect: Mood normal.         Behavior: Behavior normal.           LABS:  Recent Labs     01/02/24  1626 01/03/24 0222   WBC 3.7* 3.2*   RBC 3.40* 4.28   HEMOGLOBIN 9.4* 11.8*   HEMATOCRIT 28.4* 35.9*   MCV 83.5 83.9   MCH 27.6 27.6   RDW 45.0 45.4   PLATELETCT 95* 116*   MPV 9.8 10.4   NEUTSPOLYS 73.20* 62.10   LYMPHOCYTES 18.00* 25.20   MONOCYTES 6.60 8.10   EOSINOPHILS 1.40 3.10   BASOPHILS 0.50 1.20     Recent Labs     01/02/24  1626 01/03/24 0222   SODIUM 133* 137   POTASSIUM 3.7 3.9   CHLORIDE 106 110   CO2 15* 17*   BUN 40* 34*   CREATININE 1.26 0.94   CALCIUM 7.4* 7.7*   ALBUMIN  --  2.4*     Estimated GFR/CRCL = Estimated Creatinine Clearance: 65.7 mL/min (by C-G formula based on SCr of 0.94 mg/dL).  Recent Labs     01/02/24  1626 01/03/24 0222    GLUCOSE 162* 104*     Recent Labs     01/02/24 1953 01/03/24 0222   ASTSGOT  --  78*   ALTSGPT  --  63*   TBILIRUBIN  --  0.8   ALKPHOSPHAT  --  146*   GLOBULIN  --  3.3   INR  --  1.41*   AMMONIA 69*  --              Recent Labs     01/03/24 0222   INR 1.41*   APTT 36.2*         IMAGING:  DX-KNEE COMPLETE 4+ LEFT   Final Result         1.  No radiographic evidence of acute injury.      2. Diffuse soft tissue swelling about the knee especially in the suprapatellar region suspicious for inflammation and/or infection.      3. Previous revision of left total knee replacement.          CULTURES:   Results       Procedure Component Value Units Date/Time    GRAM STAIN [[id number]] Collected: 01/02/24 1615    Order Status: Completed Specimen: Synovial Updated: 01/02/24 1834     Significant Indicator .     Source SYNO     Site Left Knee     Gram Stain Result Many WBCs.  No organisms seen.      Narrative:      L knee  L knee    FLUID CULTURE W/GRAM STAIN [[id number]] Collected: 01/02/24 1615    Order Status: Sent Specimen: Synovial Fluid Updated: 01/02/24 1834     Significant Indicator NEG     Source SYNO     Site Left Knee     Culture Result -     Gram Stain Result Many WBCs.  No organisms seen.      Narrative:      L knee  L knee            MEDS:  Current Facility-Administered Medications   Medication Last Admin    senna-docusate (Pericolace Or Senokot S) 8.6-50 MG per tablet 2 Tablet 2 Tablet at 01/03/24 0525    And    polyethylene glycol/lytes (Miralax) Packet 1 Packet      And    magnesium hydroxide (Milk Of Magnesia) suspension 30 mL      And    bisacodyl (Dulcolax) suppository 10 mg      labetalol (Normodyne/Trandate) injection 10 mg      atorvastatin (Lipitor) tablet 20 mg      furosemide (Lasix) tablet 20 mg 20 mg at 01/03/24 0518    metoprolol SR (Toprol XL) tablet 25 mg      potassium chloride ER (Klor-Con) tablet 10 mEq 10 mEq at 01/03/24 0519    cefepime (Maxipime) 2 g in  mL IVPB Stopped at 01/03/24  0608    acetaminophen (Tylenol) tablet 1,000 mg      Or    acetaminophen (Tylenol) suppository 650 mg      omeprazole (PriLOSEC) capsule 20 mg 20 mg at 01/03/24 0518    tiotropium (Spiriva Respimat) 2.5 mcg/Act inhalation spray 5 mcg 5 mcg at 01/03/24 0525    ferrous sulfate tablet 325 mg 325 mg at 01/03/24 0518    gabapentin (Neurontin) capsule 400 mg 400 mg at 01/03/24 0518    losartan (Cozaar) tablet 100 mg 100 mg at 01/03/24 0518    lactulose 20 GM/30ML solution 15 mL 15 mL at 01/03/24 0518       ASSESSMENT/PLAN:  63 y.o. female admitted for   * Septic arthritis (HCC)- (present on admission)  Assessment & Plan  Patient with history of septic arthritis in L knee with history of surgery to affected joint. S/p 6 week course of cefepime EOT 12/11. Plan was for possible aspiration of joint following course of antibiotics and consideration of further antibiotics versus surgical management. Joint aspirated in ED. Cell count with ~20,000 nucleated cells (98% polys), 100k RBC, initial gram stain with WBCs no organisms.  Patient with 0 out of 4 SIRS criteria though does have evidence of endorgan dysfunction with elevated creatinine and altered mental status.  These are most likely secondary to poor p.o. intake and underlying cirrhosis though cannot rule out early sepsis.  Antibiotics administered in ED prior to blood cultures.  -Cefepime 2g q8h   -Orthopedic surgery to see patient 1/3, patient NPO at midnight and medication ppx held for surgical consideration   -ID when culture results for definitive recs, followed by DANYELL Beaz   -Will reach out to patient's daughter 1/3 for collateral  -As needed pain medication ordered, will attempt to avoid opiates if possible given altered mental status  -If patient develops fever or hemodynamic instability will proceed with full sepsis workup, no fever since admission as of 1/3    AMS (altered mental status)- (present on admission)  Assessment & Plan  Patient oriented  to person and event, not place or time. Per chart review, patient noted to be A&Ox4 last month representing a change from baseline. Likely hepatic encephalopathy 2/2 septic arthritis.  Elevated ammonia to 69 is not diagnostic however it does increase suspicion for hepatic encephalopathy.  Unlikely SBP given lack of fever, abdominal pain, ascites. Covered with cefepime. Patient with mild hyponatremia on admission, otherwise no electrolyte abnormality on BMP. Blood alcohol level negative  -Home lactulose ordered on admission, titrate to 2-3 soft BM per day with concern for hepatic encephalopathy   -1/3 mental status appears dramatically improved.  Due to rapid improvement, more likely consistent with delirium, likely secondary to current infection  -Treating septic arthritis   -Delirium precautions  -Continue to monitor electrolytes and replete as indicated    ELIZABETH (acute kidney injury) (HCC)- (present on admission)  Assessment & Plan  Cr elevated to 1.26 on admission from baseline that ranges from 0.6-1.0.  Elevated BUN to creatinine ratio.  Likely prerenal in the setting of altered mental status and poor p.o. intake.  Patient with some bilateral lower extremity edema, without appreciable ascites and lungs are clear to auscultation.  Patient has echocardiogram from 1 year ago with near normal results.  Will dose fluids gently in setting of known cirrhosis.  -1 L IV fluids overnight 1/2  - 1/3 Creatinine improved 0.94   - Continue to monitor labs and fluid status    Hypertension- (present on admission)  Assessment & Plan  Chronic, SBP ranging 120-145 since admission.  - Home losartan  - As needed antihypertensives ordered    Controlled type 2 diabetes mellitus with hyperglycemia, without long-term current use of insulin (HCC)- (present on admission)  Assessment & Plan  Patient with a history of type 2 diabetes.  Most recent A1c in the normal range at 5.4 in January 2023.  Not on outpatient medication.  Patient  currently altered, pending SLP eval.  - Continue to monitor blood glucose on BMP, will consider initiating sliding scale insulin if sugars are elevated  - 1/3 AM glucose 104    Cirrhosis, alcoholic (HCC)- (present on admission)  Assessment & Plan  History of alcoholic cirrhosis. MELD-NA 1/3 is 13, <2% 3-month mortality risk.  Likely has altered mental status secondary to hepatic encephalitis in the setting of septic arthritis.  -Home Lasix and potassium  -Lactulose daily ordered at her home dosing.  See #altered mental status    GERD (gastroesophageal reflux disease)- (present on admission)  Assessment & Plan  - Home PPI    Hyperlipidemia- (present on admission)  Assessment & Plan  - Home statin    Paroxysmal atrial fibrillation, hx of- (present on admission)  Assessment & Plan  Patient on metoprolol, not on anticoagulation likely due to underlying coagulopathy in setting of cirrhosis.  - Home metoprolol         Core Measures:  Fluids: 0.9NS Bolus in ED  Lines: Peripheral IV for intravenous access  Abx: Cefepime  Diet:  NPO awaiting Ortho eval  PPX: SCDs/TEDs and pharmacologic prophylaxis contraindicated due to pending surgical consult, hx of cirrhosis with elevated INR    CODE Status: Full Code      Disposition  Patient is not medically cleared for discharge.   Anticipate discharge  pending PT/OT .  I have placed the appropriate orders for post-discharge needs.    I have personally seen and examined the patient at bedside. I discussed the plan of care with patient, bedside RN, , and  Russell Ohara M.d..      Rod Dunn M.D.   PGY-1  [facility]

## 2024-01-03 NOTE — THERAPY
01/03/24 1331   Interdisciplinary Plan of Care Collaboration   Collaboration Comments OT consult received. Pt pending ortho consult for septic knee. Will follow and complete eval as appropriate once POC further established.

## 2024-01-03 NOTE — ED NOTES
Med rec updated and complete. Allergies reviewed. Confirmed name and date of birth.  Pt denies  antibiotic use in last 30 days.  Pt denies anticoagulant and antiplatelet medications.    [facility]  [phone]

## 2024-01-03 NOTE — ASSESSMENT & PLAN NOTE
Cr elevated to 1.26 on admission from baseline that ranges from 0.6-1.0.  Elevated BUN to creatinine ratio.  Likely prerenal in the setting of altered mental status and poor p.o. intake.  Patient with some bilateral lower extremity edema, without appreciable ascites and lungs are clear to auscultation.  Patient has echocardiogram from 1 year ago with near normal results.  Will dose fluids gently in setting of known cirrhosis.  - 1 L IV fluids overnight 1/2  - 1/3 Creatinine improved 0.94  - 1/5 creatinine 0.74  - Resolved, no need for additional monitoring unless clinically changed, no concerns as of 1/8

## 2024-01-03 NOTE — PROGRESS NOTES
Report received from NOC RN. Patient A&Ox3, disoriented to time. Pt requires some reorienting  Patient on room air. Patient updated on plan of care. No signs of acute distress noted at this time. Bed is locked in lowest position. Call light within reach.

## 2024-01-03 NOTE — ASSESSMENT & PLAN NOTE
Chronic, SBP ranging 120-145 since admission.  - Home losartan  - As needed antihypertensives ordered

## 2024-01-03 NOTE — ASSESSMENT & PLAN NOTE
Patient on metoprolol, not on anticoagulation likely due to underlying coagulopathy in setting of cirrhosis.  - Pt in afib since admission, no RVR.  - Tele monitoring  - Home metoprolol

## 2024-01-03 NOTE — DISCHARGE PLANNING
Care Transition Team Assessment  In the case of an emergency, pt's legal NOK isMERLEA daughter Ava Laureano     RNCM met with pt at bedside and obtained the information used in this assessment. Pt verified accuracy of facesheet. Pt lives in a single story home with adult daughter and granddaughter.Pt uses [facility]. Prior to current hospitalization, pt was completely independent in ADLS/IADLS. Pt ambulates with FWW or cane Pt has a good support system. Pt denies any hx of substance use although has dx of ETOH cirrhosis denies any dx of mh.  Information Source. Good family support in the area.    Orientation Level: Oriented X4  Information Given By: Patient, Relative  Informant's Name: April  Who is responsible for making decisions for patient? : Patient         Elopement Risk  Legal Hold: No  Ambulatory or Self Mobile in Wheelchair: No-Not an Elopement Risk    Interdisciplinary Discharge Planning  Does Admitting Nurse Feel This Could be a Complex Discharge?: No  Primary Care Physician: Gavi  Lives with - Patient's Self Care Capacity: Adult Children  Patient or legal guardian wants to designate a caregiver: No  Support Systems: Family Member(s)  Housing / Facility: 1 Story House  Do You Take your Prescribed Medications Regularly: Yes  Mobility Issues: Yes  Prior Services: None, Intermittent Physical Support for ADL Per Service  Durable Medical Equipment: Walker    Discharge Preparedness  What is your plan after discharge?: Uncertain - pending medical team collaboration  What are your discharge supports?: Child  Prior Functional Level: Ambulatory, Independent with Activities of Daily Living, Independent with Medication Management, Uses Cane, Uses Walker  Difficulity with ADLs: None  Difficulity with IADLs: None    Functional Assesment  Prior Functional Level: Ambulatory, Independent with Activities of Daily Living, Independent with Medication Management, Uses Cane, Uses  Walker    Finances  Prescription Coverage: Yes    Vision / Hearing Impairment  Vision Impairment : Yes  Right Eye Vision: Impaired, Wears Glasses  Left Eye Vision: Impaired, Wears Glasses  Hearing Impairment : Yes  Hearing Impairment: Hearing Device Not Available  Does Pt Need Special Equipment for the Hearing Impaired?: No         Advance Directive  Advance Directive?: DPOA for Health Care    Domestic Abuse  Have you ever been the victim of abuse or violence?: No  Physical Abuse or Sexual Abuse: No  Verbal Abuse or Emotional Abuse: No  Possible Abuse/Neglect Reported to:: Not Applicable    Psychological Assessment  History of Substance Abuse: None  History of Psychiatric Problems: No         Anticipated Discharge Information  Discharge Disposition: Discharged to home/self care (01)

## 2024-01-03 NOTE — ASSESSMENT & PLAN NOTE
"Patient with history of septic arthritis in L knee with history of surgery to affected joint. S/p 6 week course of cefepime EOT 12/11. Plan was for possible aspiration of joint following course of antibiotics and consideration of further antibiotics versus surgical management. Joint aspirated in ED. Cell count with ~20,000 nucleated cells (98% polys), 100k RBC, initial gram stain with WBCs no organisms.  Patient with 0 out of 4 SIRS criteria though does have evidence of endorgan dysfunction with elevated creatinine and altered mental status.  These are most likely secondary to poor p.o. intake and underlying cirrhosis though cannot rule out early sepsis.  Antibiotics administered in ED prior to blood cultures.  -Initially started on Cefepime 2g q8h  -Switch to vancomycin 1/4, plans for adjustment if indicated by fluid culture  -1/7 spoke with orthopedics, they state they do not recommend any additional surgery for washout or revision, as this has not showed any benefit to the patient in the past and is high risk for her as a poor surgical candidate.  They are willing to do amputation if surgical intervention is necessary, which they believe is the only logical neck step at this time.  -1/7 spoke with ID, they state while washout is the only possible next step to avoid amputation, they do not feel the patient will improve with antibiotic therapy alone.  They state it is \"up to the patient\" when she is ready to proceed with amputation.  -As of 1/7, with consultation with Ortho and ID, I do not feel the patient will benefit from further antibiotic therapy.  Recommended next step is amputation of the limb.  Will continue IV antibiotic therapy for now, optimizing patient as a surgical candidate while awaiting her consent for the procedure.  -Discussion with family and patient 1/8, would like to wait for patient's family to get him down to see her before surgery as they understand she is high risk and may possibly do the " procedure.  In discussion with pharmacy, must wait for final sensitivities of fluid cultures to come back before transitioning from IV antibiotics.  -See #preoperative cardiovascular examination   -Following fluid cultures drawn 1/2, 5 days will be 1/7.  Corynebacterium striatum shows local susceptibility to oral Augmentin, oral linezolid for severe infections, appropriate if patient chooses to decline amputation  -As needed pain medication ordered  -No fever since admission as of 1/8

## 2024-01-03 NOTE — ASSESSMENT & PLAN NOTE
Patient oriented to person and event, not place or time. Per chart review, patient noted to be A&Ox4 last month representing a change from baseline. Likely hepatic encephalopathy 2/2 septic arthritis.  Elevated ammonia to 69 is not diagnostic however it does increase suspicion for hepatic encephalopathy.  Unlikely SBP given lack of fever, abdominal pain, ascites. Covered with cefepime. Patient with mild hyponatremia on admission, otherwise no electrolyte abnormality on BMP. Blood alcohol level negative  -Home lactulose ordered on admission, titrate to 2-3 soft BM per day with concern for hepatic encephalopathy   -1/3 mental status appears dramatically improved.  Due to rapid improvement, more likely consistent with delirium, likely secondary to current infection  -1/8 patient remains A/Ox4  -Treating septic arthritis   -Delirium precautions  -Continue to monitor electrolytes and replete as indicated

## 2024-01-04 ENCOUNTER — HOSPITAL ENCOUNTER (INPATIENT)
Facility: REHABILITATION | Age: 64
End: 2024-01-04
Attending: PHYSICAL MEDICINE & REHABILITATION | Admitting: PHYSICAL MEDICINE & REHABILITATION
Payer: MEDICAID

## 2024-01-04 LAB
CRP SERPL HS-MCNC: 3.58 MG/DL (ref 0–0.75)
ERYTHROCYTE [SEDIMENTATION RATE] IN BLOOD BY WESTERGREN METHOD: 25 MM/HOUR (ref 0–25)

## 2024-01-04 PROCEDURE — 700111 HCHG RX REV CODE 636 W/ 250 OVERRIDE (IP): Performed by: INTERNAL MEDICINE

## 2024-01-04 PROCEDURE — A9270 NON-COVERED ITEM OR SERVICE: HCPCS | Performed by: FAMILY MEDICINE

## 2024-01-04 PROCEDURE — 36415 COLL VENOUS BLD VENIPUNCTURE: CPT

## 2024-01-04 PROCEDURE — 700105 HCHG RX REV CODE 258: Performed by: INTERNAL MEDICINE

## 2024-01-04 PROCEDURE — 700102 HCHG RX REV CODE 250 W/ 637 OVERRIDE(OP)

## 2024-01-04 PROCEDURE — A9270 NON-COVERED ITEM OR SERVICE: HCPCS

## 2024-01-04 PROCEDURE — 700111 HCHG RX REV CODE 636 W/ 250 OVERRIDE (IP): Mod: JZ

## 2024-01-04 PROCEDURE — 770020 HCHG ROOM/CARE - TELE (206)

## 2024-01-04 PROCEDURE — 86140 C-REACTIVE PROTEIN: CPT

## 2024-01-04 PROCEDURE — 700105 HCHG RX REV CODE 258

## 2024-01-04 PROCEDURE — 99255 IP/OBS CONSLTJ NEW/EST HI 80: CPT | Performed by: INTERNAL MEDICINE

## 2024-01-04 PROCEDURE — 99255 IP/OBS CONSLTJ NEW/EST HI 80: CPT | Performed by: PHYSICAL MEDICINE & REHABILITATION

## 2024-01-04 PROCEDURE — 85652 RBC SED RATE AUTOMATED: CPT

## 2024-01-04 PROCEDURE — 700102 HCHG RX REV CODE 250 W/ 637 OVERRIDE(OP): Performed by: FAMILY MEDICINE

## 2024-01-04 PROCEDURE — 99232 SBSQ HOSP IP/OBS MODERATE 35: CPT | Mod: GC | Performed by: FAMILY MEDICINE

## 2024-01-04 RX ORDER — OXYCODONE HYDROCHLORIDE 5 MG/1
5 TABLET ORAL EVERY 6 HOURS PRN
Status: DISCONTINUED | OUTPATIENT
Start: 2024-01-04 | End: 2024-01-09 | Stop reason: HOSPADM

## 2024-01-04 RX ADMIN — VANCOMYCIN HYDROCHLORIDE 2000 MG: 5 INJECTION, POWDER, LYOPHILIZED, FOR SOLUTION INTRAVENOUS at 14:02

## 2024-01-04 RX ADMIN — TIOTROPIUM BROMIDE INHALATION SPRAY 5 MCG: 3.12 SPRAY, METERED RESPIRATORY (INHALATION) at 05:24

## 2024-01-04 RX ADMIN — METOPROLOL SUCCINATE 25 MG: 25 TABLET, EXTENDED RELEASE ORAL at 21:15

## 2024-01-04 RX ADMIN — ACETAMINOPHEN 1000 MG: 500 TABLET ORAL at 18:37

## 2024-01-04 RX ADMIN — DOCUSATE SODIUM 50 MG AND SENNOSIDES 8.6 MG 2 TABLET: 8.6; 5 TABLET, FILM COATED ORAL at 16:55

## 2024-01-04 RX ADMIN — ATORVASTATIN CALCIUM 20 MG: 20 TABLET, FILM COATED ORAL at 21:15

## 2024-01-04 RX ADMIN — LOSARTAN POTASSIUM 100 MG: 50 TABLET, FILM COATED ORAL at 05:24

## 2024-01-04 RX ADMIN — GABAPENTIN 400 MG: 400 CAPSULE ORAL at 13:58

## 2024-01-04 RX ADMIN — GABAPENTIN 400 MG: 400 CAPSULE ORAL at 05:24

## 2024-01-04 RX ADMIN — OXYCODONE HYDROCHLORIDE 5 MG: 5 TABLET ORAL at 11:33

## 2024-01-04 RX ADMIN — OXYCODONE HYDROCHLORIDE 5 MG: 5 TABLET ORAL at 23:56

## 2024-01-04 RX ADMIN — FUROSEMIDE 20 MG: 20 TABLET ORAL at 05:24

## 2024-01-04 RX ADMIN — OXYCODONE HYDROCHLORIDE 5 MG: 5 TABLET ORAL at 17:34

## 2024-01-04 RX ADMIN — ACETAMINOPHEN 1000 MG: 500 TABLET ORAL at 10:40

## 2024-01-04 RX ADMIN — OMEPRAZOLE 20 MG: 20 CAPSULE, DELAYED RELEASE ORAL at 05:24

## 2024-01-04 RX ADMIN — FERROUS SULFATE TAB 325 MG (65 MG ELEMENTAL FE) 325 MG: 325 (65 FE) TAB at 05:24

## 2024-01-04 RX ADMIN — ACETAMINOPHEN 1000 MG: 500 TABLET ORAL at 23:56

## 2024-01-04 RX ADMIN — ACETAMINOPHEN 1000 MG: 500 TABLET ORAL at 03:53

## 2024-01-04 RX ADMIN — CEFEPIME 2 G: 2 INJECTION, POWDER, FOR SOLUTION INTRAVENOUS at 05:31

## 2024-01-04 RX ADMIN — GABAPENTIN 400 MG: 400 CAPSULE ORAL at 21:15

## 2024-01-04 RX ADMIN — POTASSIUM CHLORIDE 10 MEQ: 750 TABLET, EXTENDED RELEASE ORAL at 05:24

## 2024-01-04 ASSESSMENT — FIBROSIS 4 INDEX
FIB4 SCORE: 5.34
FIB4 SCORE: 5.34

## 2024-01-04 NOTE — PROGRESS NOTES
Pharmacy Vancomycin Kinetics Note for 1/4/2024     63 y.o. female on Vancomycin Day # 1     Vancomycin Indication (AUC Dosing): Septic L knee    Provider specified end date: 02/15/24    Active Antibiotics (From admission, onward)      Ordered     Ordering Provider       Thu Jan 4, 2024  2:42 PM    01/04/24 1442  vancomycin (Vancocin) 750 mg in  mL IVPB  (vancomycin (VANCOCIN) IV (LD + Maintenance))  EVERY 12 HOURS         Carmelo Amezquita M.D.       Thu Jan 4, 2024 12:49 PM    01/04/24 1249  vancomycin (Vancocin) 2,000 mg in  mL IVPB  (vancomycin (VANCOCIN) IV (LD + Maintenance))  ONCE         Carmelo Amezquita M.D.       Thu Jan 4, 2024 12:37 PM    01/04/24 1237  MD Alert...Vancomycin per Pharmacy  PHARMACY TO DOSE        Question:  Indication(s) for vancomycin?  Answer:  Osteomyelitis    Carmelo Amezquita M.D.          Dosing Weight: 79 kg (174 lb 2.6 oz)    Admission History: Admitted on 1/2/2024 for Septic arthritis    Allergies: Nkda [no known drug allergy]     Pertinent cultures to date:   Results       Procedure Component Value Units Date/Time    JOINT INFECTION PANEL [[id number]] Collected: 01/02/24 1615    Order Status: Sent Specimen: Body Fluid from Synovial Fluid Updated: 01/04/24 1305    FLUID CULTURE W/GRAM STAIN [[id number]]  (Abnormal) Collected: 01/02/24 1615    Order Status: Completed Specimen: Synovial Fluid Updated: 01/03/24 1427     Significant Indicator POS     Source SYNO     Site Left Knee     Culture Result -     Gram Stain Result Many WBCs.  No organisms seen.       Culture Result Corynebacterium striatum group  Rare growth      Narrative:      L knee  L knee    GRAM STAIN [[id number]] Collected: 01/02/24 1615    Order Status: Completed Specimen: Synovial Updated: 01/02/24 1834     Significant Indicator .     Source SYNO     Site Left Knee     Gram Stain Result Many WBCs.  No organisms seen.      Narrative:      L knee  L knee          Labs:   Estimated Creatinine Clearance: 66.3  "mL/min (by C-G formula based on SCr of 0.94 mg/dL).    Recent Labs     24  1626 24  0222   WBC 3.7* 3.2*   NEUTSPOLYS 73.20* 62.10     Recent Labs     24  1626 24  0222   BUN 40* 34*   CREATININE 1.26 0.94   ALBUMIN  --  2.4*       Intake/Output Summary (Last 24 hours) at 2024 1442  Last data filed at 2024 1148  Gross per 24 hour   Intake 1900 ml   Output 1350 ml   Net 550 ml      BP (!) 152/98   Pulse 71   Temp 36.6 °C (97.9 °F) (Temporal)   Resp 16   Ht 1.702 m (5' 7\")   Wt 79 kg (174 lb 2.6 oz)   SpO2 93%  Temp (24hrs), Av.5 °C (97.7 °F), Min:36 °C (96.8 °F), Max:36.9 °C (98.4 °F)    List concerns for Vancomycin clearance:   BUN/Scr ratio greater than 20:1;Obesity    Pharmacokinetics:   AUC kinetics:   Ke (hr ^-1): 0.0592 hr^-1  Half life: 11.71 hr  Clearance: 3.04  Estimated TDD: 1520  Estimated Dose: 868  Estimated interval: 13.7    A/P:     Hx of multiple L knee PJI's. Most recently grew PsAr (& VRE/CoNS throught to be contaminants), s/p 6 weeks of cefepime (thru ). Admitted now w/ recurrent septic L knee. Started on cefepime s/p L knee arthrocentesis, which is now +Corynebacterium striatum. Cefepime transitioned to vancomycin for 6 weeks total, through 12/15 per ID recs.      Day #1 of vancomycin. Load with 2000 mg (25 mg/kg) IV once today, followed by maintenance dosing of 750 mg Q12H tomorrow morning for a predicted AUC of 493 (goal 400-600). Renal function stable at baseline. Steady state levels ~ to .      Pharmacy will continue following.      Tyrell Akins, PharmD    "

## 2024-01-04 NOTE — PROGRESS NOTES
Monitor Summary  Rhythm: Afib  Rate: 62-87  Ectopy: rare PVC's  Measurements: ---/0.06/0.37

## 2024-01-04 NOTE — THERAPY
Physical Therapy Contact Note    Patient Name: April Alicia  Age:  63 y.o., Sex:  female  Medical Record #: [id number]  Today's Date: 1/4/2024    PT consult received, chart reported pending ortho surgery consult, no note/plan of care noted; will monitor for role/appropriate timing of PT evaluation for accurate assessment of PT/dc needs;     Dodie VALERIO, PT,  [phone]

## 2024-01-04 NOTE — DISCHARGE PLANNING
PM&R referral from Dr. Dunn. Potential NTBI Encephalopathy potentially related to septic arthritis based on chart review. Ongoing medical management as well as therapy need. Potential family support. Medicaid FFS provider. Physiatry to consult per protocol.

## 2024-01-04 NOTE — DIETARY
Nutrition Services: Cirrhosis Diet Education Consult   Day 2 of admit.  April Alicia is a 63 y.o. female with admitting DX of Septic arthritis (HCC) [M00.9]    RD able to visit pt at bedside to provide cirrhosis diet education. RD discussed adequate kcal and protein intake, optimizing PO intake with 4-6 smaller balanced meals if appetite is poor, and limiting sodium intake to 2000 mg/day. Pt with limited participation in learning; during RD teaching, pt requesting foods that she would like with her meals here. RD provided handout reinforcing topics discussed. Pt demonstrated readiness and some verbal evidence of learning. RD able to answer all questions to patient's satisfaction.     No other education needs identified at this time. Consider referral to outpatient nutrition services for continuation of education as indicated or per pt preferences.     Please re-consult RD as indicated.

## 2024-01-04 NOTE — CARE PLAN
The patient is Stable - Low risk of patient condition declining or worsening    Shift Goals  Clinical Goals: pain management, safety, Abx therapy  Patient Goals: comfort  Family Goals: gisele    Progress made toward(s) clinical / shift goals:    Problem: Pain - Standard  Goal: Alleviation of pain or a reduction in pain to the patient’s comfort goal  Outcome: Progressing     Problem: Knowledge Deficit - Standard  Goal: Patient and family/care givers will demonstrate understanding of plan of care, disease process/condition, diagnostic tests and medications  Outcome: Progressing     Problem: Hemodynamics  Goal: Patient's hemodynamics, fluid balance and neurologic status will be stable or improve  Outcome: Progressing     Problem: Physical Regulation  Goal: Diagnostic test results will improve  Outcome: Progressing     Problem: Skin Integrity  Goal: Skin integrity is maintained or improved  Outcome: Progressing     Problem: Fall Risk  Goal: Patient will remain free from falls  Outcome: Progressing       Patient is not progressing towards the following goals:
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
## 2024-01-04 NOTE — CONSULTS
Physical Medicine and Rehabilitation Consultation              Date of initial consultation: 1/4/2024  Requesting provider: ordered by Rod Dunn M.D. at 01/04/24 0758    Consulting provider: Marisol Clark D.O.  Reason for consultation: assess for acute inpatient rehab appropriateness  LOS: 2 Day(s)    Chief complaint: Left knee pain     HPI: The patient is a 63 y.o.  female with a past medical history of alcoholic cirrhosis, left TKA with revision 10/30 /23 by Dr. Luz, DM, and afib not on AC;  who presented on 1/2/2024  3:03 PM with left knee pain. Per documentation, patient had prior pseudomonas growing from  L knee joint with hospitalization 10/23 -11/3. Patient was started on 6 week IV abx and Dc'ed to SNF. Since that time patient was able to DC home, she completed her IV abx on 12/11. Patient returns to the ED on 1/2 with recurrent knee pain. Repeat knee aspiration completed showed 19K White cells, cultures growing corynebacterium stratum thus far. Patient has been started on cefepime, which was transitioned to vancomycin. L knee xrays show knee swelling with possible signs of infection. ID and ortho have been consulted, patient has not been able to work with PT/OT yet due to pending plans for ortho.     Patient seen and examined at bedside. Patient reports her L knee hurts. Reports the pain prevents her from being able to move her left leg. Patient reports she was able to walk when she left [facility], but then missed an appointment with the ID doctors, and now her knee is painful again. Is tearful when discussing her frustration when being back in the hospital.   Does not report HA, lightheadedness, SOB, CP, abdominal pain, or changes in numbness/tingling/weakness.       Social Hx:  Patient lives with adult children in 1 story house with 2 MELVIN per records, but patient tells me she is no longer living with her daughter and is now living with her friend Dennis   2 MELVIN  At prior level of function MOD  I with spc.       Tobacco: Former smoker   Alcohol: Denies   Drugs: Denies     THERAPY:  Restrictions: Fall Risk   PT: Functional mobility   Pending     OT: ADLs  Pending     SLP:   None     IMAGING:  DX-KNEE COMPLETE 4+ LEFT  Narrative: 1/2/2024 3:43 PM    HISTORY/REASON FOR EXAM:  Atraumatic Pain/Swelling/Deformity; history of recurrent joint infections    TECHNIQUE/EXAM DESCRIPTION AND NUMBER OF VIEWS:    4 of the left knee    COMPARISON: Left knee series 10/30/2023    FINDINGS:    There has been previous revision of left total knee replacement with removal of the tibial component and placement of a short sneha extending from the distal femur into the proximal tibia. There is diffuse soft tissue swelling about the knee especially   superior to the patella  Impression: 1.  No radiographic evidence of acute injury.    2. Diffuse soft tissue swelling about the knee especially in the suprapatellar region suspicious for inflammation and/or infection.    3. Previous revision of left total knee replacement.        PROCEDURES:  None     PMH:  Past Medical History:   Diagnosis Date    Arrhythmia     Arthritis     OA in knees    ASTHMA     Blood clotting disorder (HCC) 2018    right leg blood clot    Dental disorder     upper and lower dentures    Diabetes     Emphysema of lung (HCC)     Heart abnormality     leakage in left valve    Heart burn     left knee    Heart valve disease     Hypertension     Infection 9/4/2017    Infection 2018    Right knee after total knee    Liver disease     Pneumonia 02/2020    Seizure disorder (HCC)        PSH:  Past Surgical History:   Procedure Laterality Date    PB TOTAL KNEE ARTHROPLASTY Left 10/30/2023    Procedure: LEFT TOTAL KNEE ARTHROPLASTY EXPLANTATION, INSERTION OF ANTIBIOTIC SPACER, AND APPLICATION OF INCISIONAL WOUND VACUUM;  Surgeon: Wild Luz M.D.;  Location: SURGERY [facility];  Service: Orthopedics    PB REVISE KNEE JOINT REPLACE,ALL PARTS Left 12/28/2022     Procedure: REVISION, TOTAL ARTHROPLASTY, KNEE, ALL COMPONENTS-LEFT;  Surgeon: Wild Luz M.D.;  Location: SURGERY [facility];  Service: Orthopedics    IRRIGATION & DEBRIDEMENT GENERAL Left 12/28/2022    Procedure: IRRIGATION AND DEBRIDEMENT, WOUND;  Surgeon: Wild Luz M.D.;  Location: SURGERY [facility];  Service: Orthopedics    PB REVISE KNEE JOINT REPLACE,ALL PARTS Left 7/19/2022    Procedure: REVISION, TOTAL ARTHROPLASTY, KNEE, ALL COMPONENTS - ANTIBIOTIC SPACER;  Surgeon: Wild Luz M.D.;  Location: SURGERY [facility];  Service: Orthopedics    IRRIGATION & DEBRIDEMENT GENERAL Left 7/17/2022    Procedure: IRRIGATION AND DEBRIDEMENT, SHOULDER;  Surgeon: Obdulio Gray M.D.;  Location: SURGERY [facility];  Service: Orthopedics    PB TOTAL KNEE ARTHROPLASTY Left 8/24/2020    Procedure: ARTHROPLASTY, KNEE, TOTAL;  Surgeon: Víctor Faustin M.D.;  Location: [facility];  Service: Orthopedics    KNEE ARTHROPLASTY TOTAL Right 2018    IRRIGATION & DEBRIDEMENT ORTHO Right 9/3/2017    Procedure: IRRIGATION & DEBRIDEMENT ORTHO, POLY EXCHANGE;  Surgeon: Jerome Russell M.D.;  Location: SURGERY [facility];  Service: Orthopedics    COLONOSCOPY WITH CLIPPING  10/28/2015    Procedure: COLONOSCOPY WITH CLIPPING;  Surgeon: Ruben Colon M.D.;  Location: [facility];  Service:     COLONOSCOPY WITH SCLEROTHERAPY  10/28/2015    Procedure: COLONOSCOPY WITH SCLEROTHERAPY;  Surgeon: Ruben Colon M.D.;  Location: [facility];  Service:     COLONOSCOPY WITH TATTOOING  10/28/2015    Procedure: COLONOSCOPY WITH TATTOOING;  Surgeon: Ruben Colon M.D.;  Location: ENDOSCOPY [facility];  Service:     GYN SURGERY  2003    hysteroscoopy    GYN SURGERY  1982    tubal ligation       FHX:  No family history on file.    Medications:  Current Facility-Administered Medications   Medication Dose    cefepime (Maxipime) 2 g in  mL IVPB  2 g    senna-docusate  "(Pericolace Or Senokot S) 8.6-50 MG per tablet 2 Tablet  2 Tablet    And    polyethylene glycol/lytes (Miralax) Packet 1 Packet  1 Packet    And    magnesium hydroxide (Milk Of Magnesia) suspension 30 mL  30 mL    And    bisacodyl (Dulcolax) suppository 10 mg  10 mg    labetalol (Normodyne/Trandate) injection 10 mg  10 mg    atorvastatin (Lipitor) tablet 20 mg  20 mg    furosemide (Lasix) tablet 20 mg  20 mg    metoprolol SR (Toprol XL) tablet 25 mg  25 mg    potassium chloride ER (Klor-Con) tablet 10 mEq  10 mEq    acetaminophen (Tylenol) tablet 1,000 mg  1,000 mg    Or    acetaminophen (Tylenol) suppository 650 mg  650 mg    omeprazole (PriLOSEC) capsule 20 mg  20 mg    tiotropium (Spiriva Respimat) 2.5 mcg/Act inhalation spray 5 mcg  5 mcg    ferrous sulfate tablet 325 mg  325 mg    gabapentin (Neurontin) capsule 400 mg  400 mg    losartan (Cozaar) tablet 100 mg  100 mg    lactulose 20 GM/30ML solution 15 mL  15 mL       Allergies:  Allergies   Allergen Reactions    Nkda [No Known Drug Allergy]        Physical Exam:  Vitals: /88   Pulse 81   Temp 36.8 °C (98.2 °F) (Temporal)   Resp 16   Ht 1.702 m (5' 7\")   Wt 79 kg (174 lb 2.6 oz)   SpO2 93%   Gen: NAD, laying comfortably in bed   Head:  NC/AT  Eyes/ Nose/ Mouth: PERRLA, moist mucous membranes  Cardio: RRR, good distal perfusion, warm extremities  Pulm: normal respiratory effort, no cyanosis, on RA   Abd: Soft NTND, negative borborygmi   Ext: + L knee swelling  No calf tenderness. No clubbing.    Mental status:  A&Ox4 (person, place, date, situation) answers questions appropriately follows commands  Speech: fluent, no aphasia or dysarthria    CRANIAL NERVES:  2,3: visual acuity grossly intact, PERRL  3,4,6: EOMI bilaterally, no nystagmus or diplopia  5: sensation intact to light touch bilaterally and symmetric  7: no facial asymmetry  8: hearing grossly intact    Motor:      Upper Extremity  Myotome R L   Shoulder flexion C5 4/5 4/5   Elbow flexion " C5 5/5 5/5   Wrist extension C6 5/5 5/5   Elbow extension C7 5/5 5/5   Finger flexion C8 5/5 5/5   Finger abduction T1 5/5 5/5     Lower Extremity Myotome R L   Hip flexion L2 4/5 4/5   Knee extension L3 5/5 2, limited by pain/5   Ankle dorsiflexion L4 5/5 5/5   Toe extension L5 5/5 5/5   Ankle plantarflexion S1 5/5 5/5     Sensory:   intact to light touch through out    No clonus at bilateral ankles  Negative Eckert b/l     Tone: no spasticity noted    Coordination:   intact fine motor with fingers bilaterally      Labs: Reviewed and significant for   Recent Labs     01/02/24  1626 01/03/24 0222   RBC 3.40* 4.28   HEMOGLOBIN 9.4* 11.8*   HEMATOCRIT 28.4* 35.9*   PLATELETCT 95* 116*   PROTHROMBTM  --  17.4*   APTT  --  36.2*   INR  --  1.41*     Recent Labs     01/02/24  1626 01/03/24 0222   SODIUM 133* 137   POTASSIUM 3.7 3.9   CHLORIDE 106 110   CO2 15* 17*   GLUCOSE 162* 104*   BUN 40* 34*   CREATININE 1.26 0.94   CALCIUM 7.4* 7.7*     No results found for this or any previous visit (from the past 24 hour(s)).      ASSESSMENT:  Patient is a 63 y.o. female admitted with left knee pain     IGC Code / Diagnosis to Support: 0008.9 - Orthopaedic Disorders: Other Orthopaedic    Rehabilitation: Impaired ADLs and mobility  Patient is a poor candidate for inpatient rehab based on lack of DC support at home and desire to return to [facility].     Barriers to transfer include: Insurance authorization, TCCs to verify disposition, medical clearance and bed availability     Additional Recommendations:  L knee septic arthritis   - history of L TKA with infected joint, hospitlizated 10/23 -11/3   - dc'ed to SNF with 6 weeks course of cefepime, completed 12/11   - new recurrence of knee pain   - ortho and ID consulted, knee aspirate growing Coreynbacterium thus far  - now on vancomycin, pending plans from ortho   - pending PT/OT evals       AMS due to encephalopathy    Alcoholic cirrhosis   - ammonia elevated to 69 at time of  admit   - MELD score of 13  - on home dose lactulose     HTN   - on metoprolol and losartan     A fib   - rate controlled on metoprolol, not on AC      Dispo:  - patient is currently functioning below their level of baseline, recommend post acute rehab  - patient desires to return to [place], agree with request  - is not a candidate for IRF due to likely need for prolonged IV abx and lack of DC support at home (no longer living at daughter's house)  - PM&R will sign off         Medical Complexity:  L knee septic arthritis   AMS   Alcoholic cirrhosis   HTN   DM   A fib   Impaired mobility and ADLs       DVT PPX: SCD       Thank you for allowing us to participate in the care of this patient.     Patient was seen for 81 minutes on unit/floor of which > 50% of time was spent on counseling and coordination of care regarding the above, including prognosis, risk reduction, benefits of treatment, and options for next stage of care.    Marisol Clark D.O.   Physical Medicine and Rehabilitation     Please note that this dictation was created using voice recognition software. I have made every reasonable attempt to correct obvious errors, but there may be errors of grammar and possibly content that I did not discover before finalizing the note.

## 2024-01-04 NOTE — PROGRESS NOTES
FAMILY MEDICINE PROGRESS NOTE          PATIENT ID:  NAME:  April Alicia  MRN:               [id number]  YOB: 1960    Date of Admission: 1/2/2024     Attending: Wild Tierney M.d.     Resident: Desean Plunkett M.D. (PGY-3)    Primary Care Physician:  Anum Gatica M.D.    HPI: April Alicia is a 63 y.o. female with a past medical history of alcoholic cirrhosis, multiple orthopedic surgeries, diet controlled type 2 diabetes, rate controlled atrial fibrillation, history of septic arthritis of left knee after surgical revision admitted for septic arthritis of left knee status post 6 weeks of Cefepime on hospital day 2    Interval Problem Update  1/4 formal orthopedic consult placed, infectious disease consult placed with recommendation to change antibiotic regimen to vancomycin at this time but if PCR returns positive for Pseudomonas to restart cefepime and also with instruction to contact infectious disease team if VRE shows up on cultures.    SUBJECTIVE:   No acute events overnight, patient notes continued pain but otherwise states that she has had a normal appetite and no difficulty with voids or stools.  Nursing team reports no concerns at this time.    OBJECTIVE:  Temp:  [36 °C (96.8 °F)-36.9 °C (98.4 °F)] 36.6 °C (97.9 °F)  Pulse:  [71-81] 71  Resp:  [16] 16  BP: (122-152)/(85-98) 152/98  SpO2:  [93 %-96 %] 93 %      Intake/Output Summary (Last 24 hours) at 1/4/2024 1424  Last data filed at 1/4/2024 1148  Gross per 24 hour   Intake 1900 ml   Output 1350 ml   Net 550 ml       PHYSICAL EXAM:  Physical Exam  Constitutional:       General: She is not in acute distress.     Appearance: Normal appearance. She is not toxic-appearing.   HENT:      Head: Normocephalic and atraumatic.      Nose: Nose normal. No congestion.      Mouth/Throat:      Mouth: Mucous membranes are moist.      Pharynx: Oropharynx is clear.   Eyes:      Extraocular Movements: Extraocular movements intact.       Conjunctiva/sclera: Conjunctivae normal.      Pupils: Pupils are equal, round, and reactive to light.   Cardiovascular:      Rate and Rhythm: Normal rate and regular rhythm.      Pulses: Normal pulses.      Heart sounds: Normal heart sounds. No murmur heard.     No friction rub. No gallop.   Pulmonary:      Effort: Pulmonary effort is normal. No respiratory distress.      Breath sounds: Normal breath sounds. No stridor. No wheezing, rhonchi or rales.   Abdominal:      General: Abdomen is flat. Bowel sounds are normal. There is no distension.      Palpations: Abdomen is soft.      Tenderness: There is no abdominal tenderness.   Musculoskeletal:      Cervical back: Normal range of motion and neck supple.      Comments: Left knee with swelling, moderate tenderness, erythema and slight rubor, large surgical scar to bilateral anterior knees   Skin:     General: Skin is warm and dry.      Capillary Refill: Capillary refill takes less than 2 seconds.      Coloration: Skin is not jaundiced.      Findings: No bruising.   Neurological:      General: No focal deficit present.      Mental Status: She is alert and oriented to person, place, and time. Mental status is at baseline.      Cranial Nerves: No cranial nerve deficit.      Motor: No weakness.   Psychiatric:         Mood and Affect: Mood normal.         Behavior: Behavior normal.         Thought Content: Thought content normal.         Judgment: Judgment normal.         LABS:  Recent Labs     01/02/24  1626 01/03/24  0222   WBC 3.7* 3.2*   RBC 3.40* 4.28   HEMOGLOBIN 9.4* 11.8*   HEMATOCRIT 28.4* 35.9*   MCV 83.5 83.9   MCH 27.6 27.6   RDW 45.0 45.4   PLATELETCT 95* 116*   MPV 9.8 10.4   NEUTSPOLYS 73.20* 62.10   LYMPHOCYTES 18.00* 25.20   MONOCYTES 6.60 8.10   EOSINOPHILS 1.40 3.10   BASOPHILS 0.50 1.20     Recent Labs     01/02/24  1626 01/03/24 0222   SODIUM 133* 137   POTASSIUM 3.7 3.9   CHLORIDE 106 110   CO2 15* 17*   BUN 40* 34*   CREATININE 1.26 0.94   CALCIUM  7.4* 7.7*   ALBUMIN  --  2.4*     Estimated GFR/CRCL = Estimated Creatinine Clearance: 66.3 mL/min (by C-G formula based on SCr of 0.94 mg/dL).  Recent Labs     01/02/24  1626 01/03/24 0222   GLUCOSE 162* 104*     Recent Labs     01/02/24  1953 01/03/24 0222   ASTSGOT  --  78*   ALTSGPT  --  63*   TBILIRUBIN  --  0.8   ALKPHOSPHAT  --  146*   GLOBULIN  --  3.3   INR  --  1.41*   AMMONIA 69*  --              Recent Labs     01/03/24 0222   INR 1.41*   APTT 36.2*         IMAGING:  DX-KNEE COMPLETE 4+ LEFT   Final Result         1.  No radiographic evidence of acute injury.      2. Diffuse soft tissue swelling about the knee especially in the suprapatellar region suspicious for inflammation and/or infection.      3. Previous revision of left total knee replacement.          CULTURES:   Results       Procedure Component Value Units Date/Time    JOINT INFECTION PANEL [[id number]] Collected: 01/02/24 1615    Order Status: Sent Specimen: Body Fluid from Synovial Fluid Updated: 01/04/24 1305    FLUID CULTURE W/GRAM STAIN [[id number]]  (Abnormal) Collected: 01/02/24 1615    Order Status: Completed Specimen: Synovial Fluid Updated: 01/03/24 1427     Significant Indicator POS     Source SYNO     Site Left Knee     Culture Result -     Gram Stain Result Many WBCs.  No organisms seen.       Culture Result Corynebacterium striatum group  Rare growth      Narrative:      L knee  L knee    GRAM STAIN [[id number]] Collected: 01/02/24 1615    Order Status: Completed Specimen: Synovial Updated: 01/02/24 1834     Significant Indicator .     Source SYNO     Site Left Knee     Gram Stain Result Many WBCs.  No organisms seen.      Narrative:      L knee  L knee            MEDS:  Current Facility-Administered Medications   Medication Last Admin    oxyCODONE immediate-release (Roxicodone) tablet 5 mg 5 mg at 01/04/24 1133    MD Alert...Vancomycin per Pharmacy      vancomycin (Vancocin) 2,000 mg in  mL IVPB 2,000 mg at 01/04/24 5698     senna-docusate (Pericolace Or Senokot S) 8.6-50 MG per tablet 2 Tablet 2 Tablet at 01/03/24 0525    And    polyethylene glycol/lytes (Miralax) Packet 1 Packet      And    magnesium hydroxide (Milk Of Magnesia) suspension 30 mL      And    bisacodyl (Dulcolax) suppository 10 mg      labetalol (Normodyne/Trandate) injection 10 mg      atorvastatin (Lipitor) tablet 20 mg 20 mg at 01/03/24 2159    furosemide (Lasix) tablet 20 mg 20 mg at 01/04/24 0524    metoprolol SR (Toprol XL) tablet 25 mg 25 mg at 01/03/24 2159    potassium chloride ER (Klor-Con) tablet 10 mEq 10 mEq at 01/04/24 0524    acetaminophen (Tylenol) tablet 1,000 mg 1,000 mg at 01/04/24 1040    Or    acetaminophen (Tylenol) suppository 650 mg      omeprazole (PriLOSEC) capsule 20 mg 20 mg at 01/04/24 0524    tiotropium (Spiriva Respimat) 2.5 mcg/Act inhalation spray 5 mcg 5 mcg at 01/04/24 0524    ferrous sulfate tablet 325 mg 325 mg at 01/04/24 0524    gabapentin (Neurontin) capsule 400 mg 400 mg at 01/04/24 1358    losartan (Cozaar) tablet 100 mg 100 mg at 01/04/24 0524    lactulose 20 GM/30ML solution 15 mL 15 mL at 01/03/24 0518       ASSESSMENT/PLAN:  April Alicia is a 63 y.o. female with a past medical history of alcoholic cirrhosis, multiple orthopedic surgeries, diet controlled type 2 diabetes, rate controlled atrial fibrillation, history of septic arthritis of left knee after surgical revision admitted for septic arthritis of left knee status post 6 weeks of Cefepime on hospital day 2    * Septic arthritis (HCC)- (present on admission)  Assessment & Plan  Patient with history of septic arthritis in L knee with history of surgery to affected joint. S/p 6 week course of cefepime EOT 12/11. Plan was for possible aspiration of joint following course of antibiotics and consideration of further antibiotics versus surgical management. Joint aspirated in ED. Cell count with ~20,000 nucleated cells (98% polys), 100k RBC, initial gram stain with WBCs  no organisms.  Patient with 0 out of 4 SIRS criteria though does have evidence of endorgan dysfunction with elevated creatinine and altered mental status.  These are most likely secondary to poor p.o. intake and underlying cirrhosis though cannot rule out early sepsis.  Antibiotics administered in ED prior to blood cultures.  -Initially started on Cefepime 2g q8h   -Orthopedic surgery consulted, no current plans for surgery, will continue IV antibiotic therapy at this time while cultures finalize  -1/4 ID consulted, recommend switching coverage to vancomycin at this time, recommend to restart cefepime if psuedomonas shows up on the culture, request to be contacted if culture positive for VRE  -As needed pain medication ordered  -If patient develops fever or hemodynamic instability will proceed with full sepsis workup, no fever since admission as of 1/4    AMS (altered mental status)- (present on admission)  Assessment & Plan  Patient oriented to person and event, not place or time. Per chart review, patient noted to be A&Ox4 last month representing a change from baseline. Likely hepatic encephalopathy 2/2 septic arthritis.  Elevated ammonia to 69 is not diagnostic however it does increase suspicion for hepatic encephalopathy.  Unlikely SBP given lack of fever, abdominal pain, ascites. Covered with cefepime. Patient with mild hyponatremia on admission, otherwise no electrolyte abnormality on BMP. Blood alcohol level negative  -Home lactulose ordered on admission, titrate to 2-3 soft BM per day with concern for hepatic encephalopathy   -1/3 mental status appears dramatically improved.  Due to rapid improvement, more likely consistent with delirium, likely secondary to current infection  -1/4 patient remains A/Ox4  -Treating septic arthritis   -Delirium precautions  -Continue to monitor electrolytes and replete as indicated    GERD (gastroesophageal reflux disease)- (present on admission)  Assessment & Plan  - Home  PPI    Hyperlipidemia- (present on admission)  Assessment & Plan  - Home statin    ELIZABETH (acute kidney injury) (HCC)- (present on admission)  Assessment & Plan  Cr elevated to 1.26 on admission from baseline that ranges from 0.6-1.0.  Elevated BUN to creatinine ratio.  Likely prerenal in the setting of altered mental status and poor p.o. intake.  Patient with some bilateral lower extremity edema, without appreciable ascites and lungs are clear to auscultation.  Patient has echocardiogram from 1 year ago with near normal results.  Will dose fluids gently in setting of known cirrhosis.  - 1 L IV fluids overnight 1/2  - 1/3 Creatinine improved 0.94   - Continue to monitor labs and fluid status    Paroxysmal atrial fibrillation, hx of- (present on admission)  Assessment & Plan  Patient on metoprolol, not on anticoagulation likely due to underlying coagulopathy in setting of cirrhosis.  - Home metoprolol    Hypertension- (present on admission)  Assessment & Plan  Chronic, SBP ranging 120-145 since admission.  - Home losartan  - As needed antihypertensives ordered    Controlled type 2 diabetes mellitus with hyperglycemia, without long-term current use of insulin (HCC)- (present on admission)  Assessment & Plan  Patient with a history of type 2 diabetes.  Most recent A1c in the normal range at 5.4 in January 2023.  Not on outpatient medication.  Patient currently altered, pending SLP eval.  - Continue to monitor blood glucose on BMP, will consider initiating sliding scale insulin if sugars are elevated  - 1/3 AM glucose 104    Cirrhosis, alcoholic (HCC)- (present on admission)  Assessment & Plan  History of alcoholic cirrhosis. MELD-NA 1/3 is 13, <2% 3-month mortality risk.  Likely has altered mental status secondary to hepatic encephalitis in the setting of septic arthritis.  -Home Lasix and potassium  -Lactulose daily ordered at her home dosing.  See #altered mental status     [phone] Dr Luz (Ortho) Contact  info    Core Measures:  Fluids: Oral  Lines: Peripheral IV for intravenous access  Abx: Vancomycin  Diet: diabetic diet  PPX: SCDs/TEDs    CODE Status: Full Code      Disposition  Patient is not medically cleared for discharge.   Anticipate discharge to home with organized home healthcare and close outpatient follow-up vs SNF based on antibiotic regimen.  I have placed the appropriate orders for post-discharge needs.    I have personally seen and examined the patient at bedside. I discussed the plan of care with   Wild Tierney M.d..      Desean Plunkett M.D.   PGY-3  [facility]

## 2024-01-05 PROBLEM — E87.1 HYPONATREMIA: Status: RESOLVED | Noted: 2022-07-11 | Resolved: 2024-01-05

## 2024-01-05 PROBLEM — D62 ACUTE BLOOD LOSS ANEMIA: Status: RESOLVED | Noted: 2023-11-01 | Resolved: 2024-01-05

## 2024-01-05 PROBLEM — E87.6 HYPOKALEMIA: Status: RESOLVED | Noted: 2020-06-10 | Resolved: 2024-01-05

## 2024-01-05 PROBLEM — E87.20 METABOLIC ACIDOSIS: Status: RESOLVED | Noted: 2022-12-28 | Resolved: 2024-01-05

## 2024-01-05 PROBLEM — R53.81 OTHER MALAISE: Status: RESOLVED | Noted: 2022-08-04 | Resolved: 2024-01-05

## 2024-01-05 PROBLEM — R10.13 EPIGASTRIC PAIN: Status: RESOLVED | Noted: 2023-04-18 | Resolved: 2024-01-05

## 2024-01-05 PROBLEM — E11.9 TYPE 2 DIABETES MELLITUS (HCC): Status: RESOLVED | Noted: 2022-12-27 | Resolved: 2024-01-05

## 2024-01-05 PROBLEM — R65.21 SEPTIC SHOCK DUE TO PSEUDOMONAS SPECIES (HCC): Status: RESOLVED | Noted: 2023-10-30 | Resolved: 2024-01-05

## 2024-01-05 PROBLEM — N30.00 ACUTE CYSTITIS WITHOUT HEMATURIA: Status: RESOLVED | Noted: 2022-07-11 | Resolved: 2024-01-05

## 2024-01-05 PROBLEM — E83.42 HYPOMAGNESEMIA: Status: RESOLVED | Noted: 2023-01-02 | Resolved: 2024-01-05

## 2024-01-05 PROBLEM — Z86.79 HISTORY OF HYPERTENSION: Status: RESOLVED | Noted: 2017-09-04 | Resolved: 2024-01-05

## 2024-01-05 PROBLEM — G93.40 ACUTE ENCEPHALOPATHY: Status: RESOLVED | Noted: 2022-12-27 | Resolved: 2024-01-05

## 2024-01-05 PROBLEM — R50.9 FEVER: Status: RESOLVED | Noted: 2023-01-07 | Resolved: 2024-01-05

## 2024-01-05 PROBLEM — B19.20 HEPATITIS C INFECTION: Status: RESOLVED | Noted: 2022-07-28 | Resolved: 2024-01-05

## 2024-01-05 PROBLEM — H04.123 DRY EYES: Status: RESOLVED | Noted: 2017-11-16 | Resolved: 2024-01-05

## 2024-01-05 PROBLEM — R10.9 ABDOMINAL PAIN: Status: RESOLVED | Noted: 2023-04-20 | Resolved: 2024-01-05

## 2024-01-05 PROBLEM — A41.52 SEPTIC SHOCK DUE TO PSEUDOMONAS SPECIES (HCC): Status: RESOLVED | Noted: 2023-10-30 | Resolved: 2024-01-05

## 2024-01-05 PROBLEM — Z86.79 HISTORY OF RHEUMATIC FEVER: Status: RESOLVED | Noted: 2017-09-04 | Resolved: 2024-01-05

## 2024-01-05 PROBLEM — M00.9 SEPTIC ARTHRITIS OF SHOULDER, LEFT (HCC): Status: RESOLVED | Noted: 2022-07-17 | Resolved: 2024-01-05

## 2024-01-05 PROBLEM — K76.82 HEPATIC ENCEPHALOPATHY (HCC): Status: RESOLVED | Noted: 2022-12-27 | Resolved: 2024-01-05

## 2024-01-05 PROBLEM — R52 PAIN, UNSPECIFIED: Status: RESOLVED | Noted: 2022-08-04 | Resolved: 2024-01-05

## 2024-01-05 PROBLEM — D61.818 PANCYTOPENIA (HCC): Status: RESOLVED | Noted: 2022-07-26 | Resolved: 2024-01-05

## 2024-01-05 PROBLEM — R78.81 BACTEREMIA: Status: RESOLVED | Noted: 2022-07-11 | Resolved: 2024-01-05

## 2024-01-05 LAB
ALBUMIN SERPL BCP-MCNC: 2 G/DL (ref 3.2–4.9)
ALBUMIN/GLOB SERPL: 0.6 G/DL
ALP SERPL-CCNC: 133 U/L (ref 30–99)
ALT SERPL-CCNC: 37 U/L (ref 2–50)
ANION GAP SERPL CALC-SCNC: 10 MMOL/L (ref 7–16)
AST SERPL-CCNC: 31 U/L (ref 12–45)
BASOPHILS # BLD AUTO: 1.1 % (ref 0–1.8)
BASOPHILS # BLD: 0.05 K/UL (ref 0–0.12)
BILIRUB SERPL-MCNC: 0.4 MG/DL (ref 0.1–1.5)
BUN SERPL-MCNC: 20 MG/DL (ref 8–22)
CALCIUM ALBUM COR SERPL-MCNC: 8.6 MG/DL (ref 8.5–10.5)
CALCIUM SERPL-MCNC: 7 MG/DL (ref 8.5–10.5)
CHLORIDE SERPL-SCNC: 113 MMOL/L (ref 96–112)
CO2 SERPL-SCNC: 16 MMOL/L (ref 20–33)
CREAT SERPL-MCNC: 0.74 MG/DL (ref 0.5–1.4)
EOSINOPHIL # BLD AUTO: 0.18 K/UL (ref 0–0.51)
EOSINOPHIL NFR BLD: 4.1 % (ref 0–6.9)
ERYTHROCYTE [DISTWIDTH] IN BLOOD BY AUTOMATED COUNT: 45.8 FL (ref 35.9–50)
GFR SERPLBLD CREATININE-BSD FMLA CKD-EPI: 90 ML/MIN/1.73 M 2
GLOBULIN SER CALC-MCNC: 3.6 G/DL (ref 1.9–3.5)
GLUCOSE SERPL-MCNC: 118 MG/DL (ref 65–99)
HCT VFR BLD AUTO: 37.1 % (ref 37–47)
HGB BLD-MCNC: 12.1 G/DL (ref 12–16)
IMM GRANULOCYTES # BLD AUTO: 0.01 K/UL (ref 0–0.11)
IMM GRANULOCYTES NFR BLD AUTO: 0.2 % (ref 0–0.9)
LYMPHOCYTES # BLD AUTO: 1.13 K/UL (ref 1–4.8)
LYMPHOCYTES NFR BLD: 25.7 % (ref 22–41)
MCH RBC QN AUTO: 27.5 PG (ref 27–33)
MCHC RBC AUTO-ENTMCNC: 32.6 G/DL (ref 32.2–35.5)
MCV RBC AUTO: 84.3 FL (ref 81.4–97.8)
MONOCYTES # BLD AUTO: 0.36 K/UL (ref 0–0.85)
MONOCYTES NFR BLD AUTO: 8.2 % (ref 0–13.4)
NEUTROPHILS # BLD AUTO: 2.66 K/UL (ref 1.82–7.42)
NEUTROPHILS NFR BLD: 60.7 % (ref 44–72)
NRBC # BLD AUTO: 0 K/UL
NRBC BLD-RTO: 0 /100 WBC (ref 0–0.2)
PLATELET # BLD AUTO: 89 K/UL (ref 164–446)
PLATELETS.RETICULATED NFR BLD AUTO: 1.5 % (ref 0.6–13.1)
PMV BLD AUTO: 9.8 FL (ref 9–12.9)
POTASSIUM SERPL-SCNC: 3.4 MMOL/L (ref 3.6–5.5)
PROT SERPL-MCNC: 5.6 G/DL (ref 6–8.2)
RBC # BLD AUTO: 4.4 M/UL (ref 4.2–5.4)
SODIUM SERPL-SCNC: 139 MMOL/L (ref 135–145)
VANCOMYCIN TROUGH SERPL-MCNC: 14.9 UG/ML (ref 10–20)
WBC # BLD AUTO: 4.4 K/UL (ref 4.8–10.8)

## 2024-01-05 PROCEDURE — 85055 RETICULATED PLATELET ASSAY: CPT

## 2024-01-05 PROCEDURE — 700102 HCHG RX REV CODE 250 W/ 637 OVERRIDE(OP): Performed by: FAMILY MEDICINE

## 2024-01-05 PROCEDURE — 99232 SBSQ HOSP IP/OBS MODERATE 35: CPT | Mod: GC | Performed by: FAMILY MEDICINE

## 2024-01-05 PROCEDURE — A9270 NON-COVERED ITEM OR SERVICE: HCPCS

## 2024-01-05 PROCEDURE — 99233 SBSQ HOSP IP/OBS HIGH 50: CPT | Performed by: INTERNAL MEDICINE

## 2024-01-05 PROCEDURE — 700102 HCHG RX REV CODE 250 W/ 637 OVERRIDE(OP)

## 2024-01-05 PROCEDURE — 36415 COLL VENOUS BLD VENIPUNCTURE: CPT

## 2024-01-05 PROCEDURE — 97165 OT EVAL LOW COMPLEX 30 MIN: CPT

## 2024-01-05 PROCEDURE — A9270 NON-COVERED ITEM OR SERVICE: HCPCS | Performed by: FAMILY MEDICINE

## 2024-01-05 PROCEDURE — 97163 PT EVAL HIGH COMPLEX 45 MIN: CPT

## 2024-01-05 PROCEDURE — 700105 HCHG RX REV CODE 258: Performed by: INTERNAL MEDICINE

## 2024-01-05 PROCEDURE — 85025 COMPLETE CBC W/AUTO DIFF WBC: CPT

## 2024-01-05 PROCEDURE — 80202 ASSAY OF VANCOMYCIN: CPT

## 2024-01-05 PROCEDURE — 700111 HCHG RX REV CODE 636 W/ 250 OVERRIDE (IP): Performed by: INTERNAL MEDICINE

## 2024-01-05 PROCEDURE — 80053 COMPREHEN METABOLIC PANEL: CPT

## 2024-01-05 PROCEDURE — 770020 HCHG ROOM/CARE - TELE (206)

## 2024-01-05 RX ORDER — GABAPENTIN 300 MG/1
600 CAPSULE ORAL EVERY 8 HOURS
Status: DISCONTINUED | OUTPATIENT
Start: 2024-01-05 | End: 2024-01-09 | Stop reason: HOSPADM

## 2024-01-05 RX ADMIN — LOSARTAN POTASSIUM 100 MG: 50 TABLET, FILM COATED ORAL at 05:17

## 2024-01-05 RX ADMIN — POTASSIUM CHLORIDE 10 MEQ: 750 TABLET, EXTENDED RELEASE ORAL at 05:22

## 2024-01-05 RX ADMIN — DOCUSATE SODIUM 50 MG AND SENNOSIDES 8.6 MG 2 TABLET: 8.6; 5 TABLET, FILM COATED ORAL at 05:15

## 2024-01-05 RX ADMIN — OXYCODONE HYDROCHLORIDE 5 MG: 5 TABLET ORAL at 19:54

## 2024-01-05 RX ADMIN — OXYCODONE HYDROCHLORIDE 5 MG: 5 TABLET ORAL at 13:01

## 2024-01-05 RX ADMIN — GABAPENTIN 600 MG: 300 CAPSULE ORAL at 13:02

## 2024-01-05 RX ADMIN — GABAPENTIN 400 MG: 400 CAPSULE ORAL at 05:15

## 2024-01-05 RX ADMIN — VANCOMYCIN HYDROCHLORIDE 750 MG: 5 INJECTION, POWDER, LYOPHILIZED, FOR SOLUTION INTRAVENOUS at 15:07

## 2024-01-05 RX ADMIN — VANCOMYCIN HYDROCHLORIDE 750 MG: 5 INJECTION, POWDER, LYOPHILIZED, FOR SOLUTION INTRAVENOUS at 02:13

## 2024-01-05 RX ADMIN — ATORVASTATIN CALCIUM 20 MG: 20 TABLET, FILM COATED ORAL at 19:55

## 2024-01-05 RX ADMIN — GABAPENTIN 600 MG: 300 CAPSULE ORAL at 20:01

## 2024-01-05 RX ADMIN — TIOTROPIUM BROMIDE INHALATION SPRAY 5 MCG: 3.12 SPRAY, METERED RESPIRATORY (INHALATION) at 05:22

## 2024-01-05 RX ADMIN — OMEPRAZOLE 20 MG: 20 CAPSULE, DELAYED RELEASE ORAL at 05:15

## 2024-01-05 RX ADMIN — FERROUS SULFATE TAB 325 MG (65 MG ELEMENTAL FE) 325 MG: 325 (65 FE) TAB at 05:15

## 2024-01-05 RX ADMIN — FUROSEMIDE 20 MG: 20 TABLET ORAL at 05:17

## 2024-01-05 RX ADMIN — OXYCODONE HYDROCHLORIDE 5 MG: 5 TABLET ORAL at 06:44

## 2024-01-05 RX ADMIN — LACTULOSE 15 ML: 20 SOLUTION ORAL at 05:15

## 2024-01-05 RX ADMIN — ACETAMINOPHEN 1000 MG: 500 TABLET ORAL at 14:44

## 2024-01-05 ASSESSMENT — PAIN SCALES - PAIN ASSESSMENT IN ADVANCED DEMENTIA (PAINAD)
BREATHING: NORMAL
CONSOLABILITY: NO NEED TO CONSOLE
FACIALEXPRESSION: SMILING OR INEXPRESSIVE
TOTALSCORE: 0
BODYLANGUAGE: RELAXED

## 2024-01-05 ASSESSMENT — ACTIVITIES OF DAILY LIVING (ADL): TOILETING: INDEPENDENT

## 2024-01-05 ASSESSMENT — COGNITIVE AND FUNCTIONAL STATUS - GENERAL
STANDING UP FROM CHAIR USING ARMS: A LITTLE
CLIMB 3 TO 5 STEPS WITH RAILING: A LOT
SUGGESTED CMS G CODE MODIFIER MOBILITY: CL
HELP NEEDED FOR BATHING: A LOT
DAILY ACTIVITIY SCORE: 16
MOVING FROM LYING ON BACK TO SITTING ON SIDE OF FLAT BED: UNABLE
DRESSING REGULAR UPPER BODY CLOTHING: A LITTLE
EATING MEALS: A LITTLE
TOILETING: A LOT
MOBILITY SCORE: 14
SUGGESTED CMS G CODE MODIFIER DAILY ACTIVITY: CK
MOVING TO AND FROM BED TO CHAIR: UNABLE
PERSONAL GROOMING: A LITTLE
DRESSING REGULAR LOWER BODY CLOTHING: A LITTLE
WALKING IN HOSPITAL ROOM: A LITTLE

## 2024-01-05 ASSESSMENT — GAIT ASSESSMENTS
ASSISTIVE DEVICE: FRONT WHEEL WALKER
GAIT LEVEL OF ASSIST: CONTACT GUARD ASSIST
DEVIATION: ANTALGIC;STEP TO;DECREASED BASE OF SUPPORT
DISTANCE (FEET): 50

## 2024-01-05 ASSESSMENT — PAIN DESCRIPTION - PAIN TYPE: TYPE: ACUTE PAIN

## 2024-01-05 ASSESSMENT — FIBROSIS 4 INDEX: FIB4 SCORE: 3.61

## 2024-01-05 NOTE — CARE PLAN
The patient is Stable - Low risk of patient condition declining or worsening    Shift Goals  Clinical Goals: pain management, ABX therapy  Patient Goals: comfort, pain management  Family Goals: EDMUND    Progress made toward(s) clinical / shift goals:    Problem: Pain - Standard  Goal: Alleviation of pain or a reduction in pain to the patient’s comfort goal  Outcome: Progressing     Problem: Knowledge Deficit - Standard  Goal: Patient and family/care givers will demonstrate understanding of plan of care, disease process/condition, diagnostic tests and medications  Outcome: Progressing     Problem: Hemodynamics  Goal: Patient's hemodynamics, fluid balance and neurologic status will be stable or improve  Outcome: Progressing     Problem: Physical Regulation  Goal: Diagnostic test results will improve  Outcome: Progressing     Problem: Skin Integrity  Goal: Skin integrity is maintained or improved  Outcome: Progressing     Problem: Fall Risk  Goal: Patient will remain free from falls  Outcome: Progressing       Patient is not progressing towards the following goals:

## 2024-01-05 NOTE — CARE PLAN
Problem: Pain - Standard  Goal: Alleviation of pain or a reduction in pain to the patient’s comfort goal  Outcome: Progressing     Problem: Knowledge Deficit - Standard  Goal: Patient and family/care givers will demonstrate understanding of plan of care, disease process/condition, diagnostic tests and medications  Outcome: Progressing     Problem: Skin Integrity  Goal: Skin integrity is maintained or improved  Outcome: Progressing     Problem: Fall Risk  Goal: Patient will remain free from falls  Outcome: Progressing   The patient is Stable - Low risk of patient condition declining or worsening    Shift Goals  Clinical Goals: shower, ambulation, PT OT, POC  Patient Goals: pain control  Family Goals: EDMUND    Progress made toward(s) clinical / shift goals:  yes    Patient is not progressing towards the following goals:

## 2024-01-05 NOTE — PROGRESS NOTES
Wound found on buttock. Picture added to chart. Wound Consult ordered. Waffle overlay in place.

## 2024-01-05 NOTE — DISCHARGE PLANNING
1421  DPA sent [facility] referrals @1421 per LSJUAN F Tesfaye.     1551  DPA received a VM from Suyapa from [facility], Pt accepted pt will just need to be off the IV Vanco and PT/OT notes.

## 2024-01-05 NOTE — PROGRESS NOTES
Monitor Summary  Rhythm: A fib  Rate: 60-82  Ectopy: pvc, 9 bts VT (Dr. Plunkett updated)  0 / 0.08 / 0.40
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
## 2024-01-05 NOTE — CONSULTS
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 She has had resection and antibiotic spacer with spacer exchange x3. Unfortunately, she is recurrently lost to follow-up and we are unable to complete her antibiotic protocol or her continued follow-up. She has missed her last 7 clinic appointments and has not had follow-up. Regardless, she underwent resection, antibiotic spacer on 10/30/2023 for recurrent infection with new bacterial growth; previously it had been Strep. She discharged to SNF and completed her 6 weeks of IV antibiotics. It is unclear whether she completed the entire course or not but she did not transition to oral antibiotics. Over the last week she has had increasing pain in her left knee. She was walking without difficulty prior to that. She is unsure of the rest of her health status at this point. She notes that today her knee pain feels better than it did a week ago. No fevers, chills, nausea, vomiting.     ROS: Positive for musculoskeletal pain and what is stated in the HPI and PMH, otherwise negative review of systems    PMH:   Past Medical History:   Diagnosis Date    Arrhythmia     Arthritis     OA in knees    ASTHMA     Blood clotting disorder (HCC) 2018    right leg blood clot    Dental disorder     upper and lower dentures    Diabetes     Emphysema of lung (HCC)     Heart abnormality     leakage in left valve    Heart burn     left knee    Heart valve disease     Hypertension     Infection 9/4/2017    Infection 2018    Right knee after total knee    Liver disease     Pneumonia 02/2020    Seizure disorder (HCC)        PSH:   Past Surgical History:   Procedure Laterality Date    PB TOTAL KNEE ARTHROPLASTY Left 10/30/2023    Procedure: LEFT TOTAL KNEE ARTHROPLASTY EXPLANTATION, INSERTION OF ANTIBIOTIC SPACER, AND APPLICATION OF INCISIONAL WOUND VACUUM;  Surgeon: Wild Luz M.D.;  Location: SURGERY [facility];   Service: Orthopedics    PB REVISE KNEE JOINT REPLACE,ALL PARTS Left 12/28/2022    Procedure: REVISION, TOTAL ARTHROPLASTY, KNEE, ALL COMPONENTS-LEFT;  Surgeon: Wild Luz M.D.;  Location: SURGERY [facility];  Service: Orthopedics    IRRIGATION & DEBRIDEMENT GENERAL Left 12/28/2022    Procedure: IRRIGATION AND DEBRIDEMENT, WOUND;  Surgeon: Wild Luz M.D.;  Location: SURGERY [facility];  Service: Orthopedics    PB REVISE KNEE JOINT REPLACE,ALL PARTS Left 7/19/2022    Procedure: REVISION, TOTAL ARTHROPLASTY, KNEE, ALL COMPONENTS - ANTIBIOTIC SPACER;  Surgeon: Wild Luz M.D.;  Location: SURGERY [facility];  Service: Orthopedics    IRRIGATION & DEBRIDEMENT GENERAL Left 7/17/2022    Procedure: IRRIGATION AND DEBRIDEMENT, SHOULDER;  Surgeon: Obdulio Gray M.D.;  Location: [facility];  Service: Orthopedics    PB TOTAL KNEE ARTHROPLASTY Left 8/24/2020    Procedure: ARTHROPLASTY, KNEE, TOTAL;  Surgeon: Víctor Faustin M.D.;  Location: [facility];  Service: Orthopedics    KNEE ARTHROPLASTY TOTAL Right 2018    IRRIGATION & DEBRIDEMENT ORTHO Right 9/3/2017    Procedure: IRRIGATION & DEBRIDEMENT ORTHO, POLY EXCHANGE;  Surgeon: Jerome Russell M.D.;  Location: [facility];  Service: Orthopedics    COLONOSCOPY WITH CLIPPING  10/28/2015    Procedure: COLONOSCOPY WITH CLIPPING;  Surgeon: Ruben Colon M.D.;  Location: [facility];  Service:     COLONOSCOPY WITH SCLEROTHERAPY  10/28/2015    Procedure: COLONOSCOPY WITH SCLEROTHERAPY;  Surgeon: Ruben Colon M.D.;  Location: [facility];  Service:     COLONOSCOPY WITH TATTOOING  10/28/2015    Procedure: COLONOSCOPY WITH TATTOOING;  Surgeon: Ruben Colon M.D.;  Location: [facility];  Service:     GYN SURGERY  2003    hysteroscoopy    GYN SURGERY  1982    tubal ligation       Meds:   Medications Prior to Admission   Medication Sig Dispense Refill Last Dose    ferrous  sulfate 325 (65 Fe) MG tablet Take 325 mg by mouth every day.   2024 at 0800    losartan (COZAAR) 100 MG Tab Take 100 mg by mouth every day.   2024 at 0800    gabapentin (NEURONTIN) 400 MG Cap Take 400 mg by mouth 3 times a day.   2024 at 0800    oxyCODONE immediate release (ROXICODONE) 10 MG immediate release tablet Take 10 mg by mouth every four hours as needed for Moderate Pain.   2024 at 1100    metoprolol SR (TOPROL XL) 25 MG TABLET SR 24 HR Take 1 Tablet by mouth every evening. 30 Tablet 11 2024 at 2100    potassium chloride (MICRO-K) 10 MEQ capsule Take 1 Capsule by mouth every day. 90 Capsule 3 2024 at 0800    furosemide (LASIX) 20 MG Tab Take 1 Tablet by mouth every day. 90 Tablet 3 2024 at 0800    pantoprazole (PROTONIX) 20 MG tablet Take 20 mg by mouth every day.   2024 at 0800    atorvastatin (LIPITOR) 20 MG Tab Take 1 Tablet by mouth every evening. 90 Tablet 3 2024 at 1930    SPIRIVA HANDIHALER 18 MCG Cap Place 1 Capsule into inhaler and inhale every day.   2024 at 0800       Allergies:   Allergies   Allergen Reactions    Nkda [No Known Drug Allergy]        SH:   Social History     Socioeconomic History    Marital status: Single     Spouse name: Not on file    Number of children: Not on file    Years of education: Not on file    Highest education level: Not on file   Occupational History    Not on file   Tobacco Use    Smoking status: Former     Current packs/day: 0.00     Types: Cigarettes     Quit date: 2016     Years since quittin.0    Smokeless tobacco: Former     Quit date: 2021    Tobacco comments:     a few a day when I can   Vaping Use    Vaping Use: Never used   Substance and Sexual Activity    Alcohol use: Not Currently    Drug use: Not Currently    Sexual activity: Not on file   Other Topics Concern    Not on file   Social History Narrative    Not on file     Social Determinants of Health     Financial Resource Strain: Low Risk  (2020)     Overall Financial Resource Strain (CARDIA)     Difficulty of Paying Living Expenses: Not hard at all   Food Insecurity: No Food Insecurity (1/28/2020)    Hunger Vital Sign     Worried About Running Out of Food in the Last Year: Never true     Ran Out of Food in the Last Year: Never true   Transportation Needs: No Transportation Needs (1/28/2020)    PRAPARE - Transportation     Lack of Transportation (Medical): No     Lack of Transportation (Non-Medical): No   Physical Activity: Not on file   Stress: Not on file   Social Connections: Not on file   Intimate Partner Violence: Not on file   Housing Stability: Not on file       FH:   No family history on file.    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  Unfortunately, she has had very poor response with recurrent infections.  She has had a antibiotic spacer revision x3.  I have had long discussions with her in clinic in the hospital about continued management of chronic infection with antibiotic spacer exchange versus medical management versus amputation.  I have very poor prognosis with respect to antibiotic spacer exchange given her history of recurrent infections.  At this point, I think it is clear that it is unlikely that she is a candidate for revision exchange antibiotic spacer given her lack of eradication at any point. I have previously had very long discussions with her about amputation and will continue to have those discussions with her given that this is likely her only long term option. For now, I agree with continued antibiotic suppression per infectious disease.      Wild Luz M.D.

## 2024-01-05 NOTE — DISCHARGE PLANNING
Please review the consult from Dr. Clark regarding post acute recommendations.  [facility] will no longer follow.  Please reach out to myself with any questions.

## 2024-01-06 LAB
BASOPHILS # BLD AUTO: 1.2 % (ref 0–1.8)
BASOPHILS # BLD: 0.06 K/UL (ref 0–0.12)
EOSINOPHIL # BLD AUTO: 0.16 K/UL (ref 0–0.51)
EOSINOPHIL NFR BLD: 3.2 % (ref 0–6.9)
ERYTHROCYTE [DISTWIDTH] IN BLOOD BY AUTOMATED COUNT: 45.6 FL (ref 35.9–50)
HCT VFR BLD AUTO: 40.2 % (ref 37–47)
HGB BLD-MCNC: 13.5 G/DL (ref 12–16)
IMM GRANULOCYTES # BLD AUTO: 0.01 K/UL (ref 0–0.11)
IMM GRANULOCYTES NFR BLD AUTO: 0.2 % (ref 0–0.9)
LYMPHOCYTES # BLD AUTO: 1.36 K/UL (ref 1–4.8)
LYMPHOCYTES NFR BLD: 27.3 % (ref 22–41)
MCH RBC QN AUTO: 27.4 PG (ref 27–33)
MCHC RBC AUTO-ENTMCNC: 33.6 G/DL (ref 32.2–35.5)
MCV RBC AUTO: 81.5 FL (ref 81.4–97.8)
MONOCYTES # BLD AUTO: 0.28 K/UL (ref 0–0.85)
MONOCYTES NFR BLD AUTO: 5.6 % (ref 0–13.4)
NEUTROPHILS # BLD AUTO: 3.12 K/UL (ref 1.82–7.42)
NEUTROPHILS NFR BLD: 62.5 % (ref 44–72)
NRBC # BLD AUTO: 0 K/UL
NRBC BLD-RTO: 0 /100 WBC (ref 0–0.2)
PLATELET # BLD AUTO: 81 K/UL (ref 164–446)
PLATELETS.RETICULATED NFR BLD AUTO: 1.6 % (ref 0.6–13.1)
PMV BLD AUTO: 10.8 FL (ref 9–12.9)
RBC # BLD AUTO: 4.93 M/UL (ref 4.2–5.4)
VANCOMYCIN PEAK SERPL-MCNC: 24 UG/ML (ref 20–40)
WBC # BLD AUTO: 5 K/UL (ref 4.8–10.8)

## 2024-01-06 PROCEDURE — 99233 SBSQ HOSP IP/OBS HIGH 50: CPT | Performed by: INTERNAL MEDICINE

## 2024-01-06 PROCEDURE — 700102 HCHG RX REV CODE 250 W/ 637 OVERRIDE(OP)

## 2024-01-06 PROCEDURE — A9270 NON-COVERED ITEM OR SERVICE: HCPCS

## 2024-01-06 PROCEDURE — 700102 HCHG RX REV CODE 250 W/ 637 OVERRIDE(OP): Performed by: FAMILY MEDICINE

## 2024-01-06 PROCEDURE — 99232 SBSQ HOSP IP/OBS MODERATE 35: CPT | Mod: GC | Performed by: FAMILY MEDICINE

## 2024-01-06 PROCEDURE — 36415 COLL VENOUS BLD VENIPUNCTURE: CPT

## 2024-01-06 PROCEDURE — 85055 RETICULATED PLATELET ASSAY: CPT

## 2024-01-06 PROCEDURE — 700111 HCHG RX REV CODE 636 W/ 250 OVERRIDE (IP): Performed by: INTERNAL MEDICINE

## 2024-01-06 PROCEDURE — 700105 HCHG RX REV CODE 258: Performed by: INTERNAL MEDICINE

## 2024-01-06 PROCEDURE — 770020 HCHG ROOM/CARE - TELE (206)

## 2024-01-06 PROCEDURE — A9270 NON-COVERED ITEM OR SERVICE: HCPCS | Performed by: FAMILY MEDICINE

## 2024-01-06 PROCEDURE — 80202 ASSAY OF VANCOMYCIN: CPT

## 2024-01-06 PROCEDURE — 85025 COMPLETE CBC W/AUTO DIFF WBC: CPT

## 2024-01-06 RX ADMIN — FERROUS SULFATE TAB 325 MG (65 MG ELEMENTAL FE) 325 MG: 325 (65 FE) TAB at 04:20

## 2024-01-06 RX ADMIN — VANCOMYCIN HYDROCHLORIDE 750 MG: 5 INJECTION, POWDER, LYOPHILIZED, FOR SOLUTION INTRAVENOUS at 14:37

## 2024-01-06 RX ADMIN — ACETAMINOPHEN 1000 MG: 500 TABLET ORAL at 17:16

## 2024-01-06 RX ADMIN — OMEPRAZOLE 20 MG: 20 CAPSULE, DELAYED RELEASE ORAL at 04:20

## 2024-01-06 RX ADMIN — OXYCODONE HYDROCHLORIDE 5 MG: 5 TABLET ORAL at 17:15

## 2024-01-06 RX ADMIN — LACTULOSE 15 ML: 20 SOLUTION ORAL at 04:21

## 2024-01-06 RX ADMIN — GABAPENTIN 600 MG: 300 CAPSULE ORAL at 21:16

## 2024-01-06 RX ADMIN — GABAPENTIN 600 MG: 300 CAPSULE ORAL at 14:33

## 2024-01-06 RX ADMIN — LOSARTAN POTASSIUM 100 MG: 50 TABLET, FILM COATED ORAL at 04:20

## 2024-01-06 RX ADMIN — DOCUSATE SODIUM 50 MG AND SENNOSIDES 8.6 MG 2 TABLET: 8.6; 5 TABLET, FILM COATED ORAL at 17:15

## 2024-01-06 RX ADMIN — POTASSIUM CHLORIDE 10 MEQ: 750 TABLET, EXTENDED RELEASE ORAL at 04:21

## 2024-01-06 RX ADMIN — OXYCODONE HYDROCHLORIDE 5 MG: 5 TABLET ORAL at 10:55

## 2024-01-06 RX ADMIN — GABAPENTIN 600 MG: 300 CAPSULE ORAL at 04:20

## 2024-01-06 RX ADMIN — OXYCODONE HYDROCHLORIDE 5 MG: 5 TABLET ORAL at 03:16

## 2024-01-06 RX ADMIN — OXYCODONE HYDROCHLORIDE 5 MG: 5 TABLET ORAL at 23:21

## 2024-01-06 RX ADMIN — ATORVASTATIN CALCIUM 20 MG: 20 TABLET, FILM COATED ORAL at 21:16

## 2024-01-06 RX ADMIN — FUROSEMIDE 20 MG: 20 TABLET ORAL at 04:20

## 2024-01-06 RX ADMIN — ACETAMINOPHEN 1000 MG: 500 TABLET ORAL at 10:55

## 2024-01-06 RX ADMIN — TIOTROPIUM BROMIDE INHALATION SPRAY 5 MCG: 3.12 SPRAY, METERED RESPIRATORY (INHALATION) at 04:21

## 2024-01-06 RX ADMIN — VANCOMYCIN HYDROCHLORIDE 750 MG: 5 INJECTION, POWDER, LYOPHILIZED, FOR SOLUTION INTRAVENOUS at 02:23

## 2024-01-06 RX ADMIN — ACETAMINOPHEN 1000 MG: 500 TABLET ORAL at 23:30

## 2024-01-06 ASSESSMENT — FIBROSIS 4 INDEX: FIB4 SCORE: 3.61

## 2024-01-06 ASSESSMENT — ENCOUNTER SYMPTOMS: FEVER: 0

## 2024-01-06 ASSESSMENT — PAIN DESCRIPTION - PAIN TYPE
TYPE: ACUTE PAIN;CHRONIC PAIN
TYPE: ACUTE PAIN;CHRONIC PAIN
TYPE: ACUTE PAIN

## 2024-01-06 NOTE — CARE PLAN
The patient is Stable - Low risk of patient condition declining or worsening    Shift Goals  Clinical Goals: Ambulation  Patient Goals: pain control  Family Goals: EDMUND    Progress made toward(s) clinical / shift goals:    Problem: Pain - Standard  Goal: Alleviation of pain or a reduction in pain to the patient’s comfort goal  Outcome: Progressing     Problem: Knowledge Deficit - Standard  Goal: Patient and family/care givers will demonstrate understanding of plan of care, disease process/condition, diagnostic tests and medications  Outcome: Progressing     Problem: Hemodynamics  Goal: Patient's hemodynamics, fluid balance and neurologic status will be stable or improve  Outcome: Progressing     Problem: Fluid Volume  Goal: Fluid volume balance will be maintained  Outcome: Progressing       Patient is not progressing towards the following goals:

## 2024-01-06 NOTE — PROGRESS NOTES
Monitor Summary  Rhythm: Afib  Rate: 51-78  Ectopy: rare PVC's  Measurements: ---/0.11/0.39

## 2024-01-06 NOTE — PROGRESS NOTES
"    FAMILY MEDICINE PROGRESS NOTE          PATIENT ID:  NAME:  April Alicia  MRN:               [id number]  YOB: 1960    Date of Admission: 1/2/2024     Attending: Wild Tierney M.d.     Resident: Rod Dunn M.D. (PGY-1)    Primary Care Physician:  Anum Gatica M.D.    HPI: April Alicia is a 63 y.o. female with a past medical history of alcoholic cirrhosis, multiple orthopedic surgeries, diet controlled type 2 diabetes, rate controlled atrial fibrillation, history of septic arthritis of left knee after surgical revision admitted for septic arthritis of left knee status post 6 weeks of Cefepime on hospital day 3    Interval Problem Update  1/4 formal orthopedic consult placed, infectious disease consult placed with recommendation to change antibiotic regimen to vancomycin at this time but if PCR returns positive for Pseudomonas to restart cefepime and also with instruction to contact infectious disease team if VRE shows up on cultures.     SUBJECTIVE:   No acute events overnight, patient states she still has left knee pain which is unchanged.  Informed patient of current plan of care, including long-term plan for amputation if infection is not improving with antibiotic therapy.  Patient understands, states \"I do want to lose my leg, but I will do it if I have to.\"  She also expresses understanding, \"I might not do very well if I have surgery.\"  In spite of this, she expresses desire to continue with therapy for now, and states she would like to have surgery if indicated.  She also reaffirms her CODE STATUS is full code.    OBJECTIVE:  Temp:  [36.4 °C (97.5 °F)-37.1 °C (98.8 °F)] 37.1 °C (98.8 °F)  Pulse:  [56-65] 64  Resp:  [16-18] 18  BP: (119-143)/(82-95) 128/82  SpO2:  [95 %-99 %] 98 %      Intake/Output Summary (Last 24 hours) at 1/5/2024 1650  Last data filed at 1/5/2024 1600  Gross per 24 hour   Intake 2080 ml   Output 1000 ml   Net 1080 ml       PHYSICAL " EXAM:  Physical Exam  Vitals reviewed.   Constitutional:       General: She is not in acute distress.     Appearance: Normal appearance. She is normal weight. She is not toxic-appearing.   HENT:      Head: Normocephalic and atraumatic.      Right Ear: External ear normal. Decreased hearing noted.      Left Ear: External ear normal. Decreased hearing noted.      Nose: Nose normal. No congestion.      Mouth/Throat:      Mouth: Mucous membranes are moist.      Pharynx: Oropharynx is clear.   Eyes:      Extraocular Movements: Extraocular movements intact.      Conjunctiva/sclera: Conjunctivae normal.      Pupils: Pupils are equal, round, and reactive to light.   Cardiovascular:      Rate and Rhythm: Normal rate and regular rhythm.      Pulses: Normal pulses.      Heart sounds: Normal heart sounds. No murmur heard.     No friction rub. No gallop.   Pulmonary:      Effort: Pulmonary effort is normal. No respiratory distress.      Breath sounds: Normal breath sounds. No stridor. No wheezing, rhonchi or rales.   Abdominal:      General: Abdomen is flat. Bowel sounds are normal. There is no distension.      Palpations: Abdomen is soft.      Tenderness: There is no abdominal tenderness. There is no guarding or rebound.   Musculoskeletal:         General: Swelling present.      Cervical back: Normal range of motion and neck supple.      Comments: Left knee with swelling, moderate tenderness, erythema and slight rubor, large surgical scar to bilateral anterior knees   Skin:     General: Skin is warm and dry.      Capillary Refill: Capillary refill takes less than 2 seconds.      Coloration: Skin is not jaundiced.      Findings: No bruising.      Comments: Old skin grafts present on BLE   Neurological:      General: No focal deficit present.      Mental Status: She is alert and oriented to person, place, and time. Mental status is at baseline.      Cranial Nerves: No cranial nerve deficit.      Motor: No weakness.   Psychiatric:          Mood and Affect: Mood normal.         Behavior: Behavior normal.         Thought Content: Thought content normal.         Judgment: Judgment normal.         LABS:  Recent Labs     01/03/24 0222 01/05/24 0338   WBC 3.2* 4.4*   RBC 4.28 4.40   HEMOGLOBIN 11.8* 12.1   HEMATOCRIT 35.9* 37.1   MCV 83.9 84.3   MCH 27.6 27.5   RDW 45.4 45.8   PLATELETCT 116* 89*   MPV 10.4 9.8   NEUTSPOLYS 62.10 60.70   LYMPHOCYTES 25.20 25.70   MONOCYTES 8.10 8.20   EOSINOPHILS 3.10 4.10   BASOPHILS 1.20 1.10     Recent Labs     01/03/24 0222 01/05/24 0338   SODIUM 137 139   POTASSIUM 3.9 3.4*   CHLORIDE 110 113*   CO2 17* 16*   BUN 34* 20   CREATININE 0.94 0.74   CALCIUM 7.7* 7.0*   ALBUMIN 2.4* 2.0*     Estimated GFR/CRCL = Estimated Creatinine Clearance: 83.8 mL/min (by C-G formula based on SCr of 0.74 mg/dL).  Recent Labs     01/03/24 0222 01/05/24 0338   GLUCOSE 104* 118*     Recent Labs     01/02/24 1953 01/03/24 0222 01/05/24 0338   ASTSGOT  --  78* 31   ALTSGPT  --  63* 37   TBILIRUBIN  --  0.8 0.4   ALKPHOSPHAT  --  146* 133*   GLOBULIN  --  3.3 3.6*   INR  --  1.41*  --    AMMONIA 69*  --   --              Recent Labs     01/03/24 0222   INR 1.41*   APTT 36.2*         IMAGING:  DX-KNEE COMPLETE 4+ LEFT   Final Result         1.  No radiographic evidence of acute injury.      2. Diffuse soft tissue swelling about the knee especially in the suprapatellar region suspicious for inflammation and/or infection.      3. Previous revision of left total knee replacement.          CULTURES:   Results       Procedure Component Value Units Date/Time    FLUID CULTURE W/GRAM STAIN [[id number]]  (Abnormal) Collected: 01/02/24 7942    Order Status: Completed Specimen: Synovial Fluid Updated: 01/05/24 1106     Significant Indicator POS     Source SYNO     Site Left Knee     Culture Result -     Gram Stain Result Many WBCs.  No organisms seen.       Culture Result Corynebacterium striatum group  Rare growth      Narrative:      L  knee  L knee    JOINT INFECTION PANEL [[id number]] Collected: 01/02/24 1615    Order Status: Sent Specimen: Body Fluid from Synovial Fluid Updated: 01/04/24 1305    GRAM STAIN [[id number]] Collected: 01/02/24 1615    Order Status: Completed Specimen: Synovial Updated: 01/02/24 1834     Significant Indicator .     Source SYNO     Site Left Knee     Gram Stain Result Many WBCs.  No organisms seen.      Narrative:      L knee  L knee            MEDS:  Current Facility-Administered Medications   Medication Last Admin    gabapentin (Neurontin) capsule 600 mg 600 mg at 01/05/24 1302    oxyCODONE immediate-release (Roxicodone) tablet 5 mg 5 mg at 01/05/24 1301    MD Alert...Vancomycin per Pharmacy      vancomycin (Vancocin) 750 mg in  mL IVPB Stopped at 01/05/24 1707    senna-docusate (Pericolace Or Senokot S) 8.6-50 MG per tablet 2 Tablet 2 Tablet at 01/05/24 0515    And    polyethylene glycol/lytes (Miralax) Packet 1 Packet      And    magnesium hydroxide (Milk Of Magnesia) suspension 30 mL      And    bisacodyl (Dulcolax) suppository 10 mg      labetalol (Normodyne/Trandate) injection 10 mg      atorvastatin (Lipitor) tablet 20 mg 20 mg at 01/04/24 2115    furosemide (Lasix) tablet 20 mg 20 mg at 01/05/24 0517    metoprolol SR (Toprol XL) tablet 25 mg 25 mg at 01/04/24 2115    potassium chloride ER (Klor-Con) tablet 10 mEq 10 mEq at 01/05/24 0522    acetaminophen (Tylenol) tablet 1,000 mg 1,000 mg at 01/05/24 1444    Or    acetaminophen (Tylenol) suppository 650 mg      omeprazole (PriLOSEC) capsule 20 mg 20 mg at 01/05/24 0515    tiotropium (Spiriva Respimat) 2.5 mcg/Act inhalation spray 5 mcg 5 mcg at 01/05/24 0522    ferrous sulfate tablet 325 mg 325 mg at 01/05/24 0515    losartan (Cozaar) tablet 100 mg 100 mg at 01/05/24 0517    lactulose 20 GM/30ML solution 15 mL 15 mL at 01/05/24 0515       ASSESSMENT/PLAN:  63 y.o. female admitted for   * Septic arthritis (HCC)- (present on admission)  Assessment &  Plan  Patient with history of septic arthritis in L knee with history of surgery to affected joint. S/p 6 week course of cefepime EOT 12/11. Plan was for possible aspiration of joint following course of antibiotics and consideration of further antibiotics versus surgical management. Joint aspirated in ED. Cell count with ~20,000 nucleated cells (98% polys), 100k RBC, initial gram stain with WBCs no organisms.  Patient with 0 out of 4 SIRS criteria though does have evidence of endorgan dysfunction with elevated creatinine and altered mental status.  These are most likely secondary to poor p.o. intake and underlying cirrhosis though cannot rule out early sepsis.  Antibiotics administered in ED prior to blood cultures.  -Initially started on Cefepime 2g q8h   -Orthopedic surgery consulted, no current plans for surgery, will continue IV antibiotic therapy at this time while cultures finalize.  Given patient's poor response to antibiotics in the past, recommend patient will likely need amputation in the future.  -1/4 ID consulted, recommend switching coverage to vancomycin at this time, recommend to restart cefepime if psuedomonas shows up on the culture, request to be contacted if culture positive for VRE  -As needed pain medication ordered  -If patient develops fever or hemodynamic instability will proceed with full sepsis workup, no fever since admission as of 1/5    AMS (altered mental status)- (present on admission)  Assessment & Plan  Patient oriented to person and event, not place or time. Per chart review, patient noted to be A&Ox4 last month representing a change from baseline. Likely hepatic encephalopathy 2/2 septic arthritis.  Elevated ammonia to 69 is not diagnostic however it does increase suspicion for hepatic encephalopathy.  Unlikely SBP given lack of fever, abdominal pain, ascites. Covered with cefepime. Patient with mild hyponatremia on admission, otherwise no electrolyte abnormality on BMP. Blood  alcohol level negative  -Home lactulose ordered on admission, titrate to 2-3 soft BM per day with concern for hepatic encephalopathy   -1/3 mental status appears dramatically improved.  Due to rapid improvement, more likely consistent with delirium, likely secondary to current infection  -1/5 patient remains A/Ox4  -Treating septic arthritis   -Delirium precautions  -Continue to monitor electrolytes and replete as indicated    ELIZABETH (acute kidney injury) (HCC)- (present on admission)  Assessment & Plan  Cr elevated to 1.26 on admission from baseline that ranges from 0.6-1.0.  Elevated BUN to creatinine ratio.  Likely prerenal in the setting of altered mental status and poor p.o. intake.  Patient with some bilateral lower extremity edema, without appreciable ascites and lungs are clear to auscultation.  Patient has echocardiogram from 1 year ago with near normal results.  Will dose fluids gently in setting of known cirrhosis.  - 1 L IV fluids overnight 1/2  - 1/3 Creatinine improved 0.94  - 1/5 creatinine 0.74  - Continue to monitor labs and fluid status    Hypertension- (present on admission)  Assessment & Plan  Chronic, SBP ranging 120-145 since admission.  - Home losartan  - As needed antihypertensives ordered    Controlled type 2 diabetes mellitus with hyperglycemia, without long-term current use of insulin (HCC)- (present on admission)  Assessment & Plan  Patient with a history of type 2 diabetes.  Most recent A1c in the normal range at 5.4 in January 2023.  Not on outpatient medication.  Patient currently altered, pending SLP eval.  - Continue to monitor blood glucose on BMP, will consider initiating sliding scale insulin if sugars are elevated  - 1/3 AM glucose 104  - 1/5 AM glucose 118    Cirrhosis, alcoholic (HCC)- (present on admission)  Assessment & Plan  History of alcoholic cirrhosis. MELD-NA 1/3 is 13, <2% 3-month mortality risk.  Likely has altered mental status secondary to hepatic encephalitis in the  setting of septic arthritis.  -Home Lasix and potassium  -Lactulose daily ordered at her home dosing.  See #altered mental status    GERD (gastroesophageal reflux disease)- (present on admission)  Assessment & Plan  - Home PPI    Hyperlipidemia- (present on admission)  Assessment & Plan  - Home statin    Paroxysmal atrial fibrillation, hx of- (present on admission)  Assessment & Plan  Patient on metoprolol, not on anticoagulation likely due to underlying coagulopathy in setting of cirrhosis.  - Home metoprolol         Core Measures:  Fluids: IV push only, no IV fluids  Lines: Peripheral IV for intravenous access  Abx: Vancomycin  Diet: regular diet  PPX: SCDs/TEDs and pharmacologic prophylaxis contraindicated due to hx of cirrhosis, coagulopathy    CODE Status: Full Code      Disposition  Patient is not medically cleared for discharge.   Anticipate discharge to skilled nursing facility.  I have placed the appropriate orders for post-discharge needs.    I have personally seen and examined the patient at bedside. I discussed the plan of care with patient, bedside RN, , and  Wild Tierney M.d..      Rod Dunn M.D.   PGY-1  [facility]

## 2024-01-06 NOTE — PROGRESS NOTES
"    FAMILY MEDICINE PROGRESS NOTE          PATIENT ID:  NAME:  April Alicia  MRN:               [id number]  YOB: 1960    Date of Admission: 1/2/2024     Attending: Ruby Birmingham M.d.     Resident: Rod Dunn M.D. (PGY-1)    Primary Care Physician:  Anum Gatica M.D.    HPI: April Alicia is a 63 y.o. female with a past medical history of alcoholic cirrhosis, multiple orthopedic surgeries, diet controlled type 2 diabetes, rate controlled atrial fibrillation, history of septic arthritis of left knee after surgical revision admitted for septic arthritis of left knee status post 6 weeks of Cefepime on hospital day 4    Interval Problem Update  1/4: formal orthopedic consult placed, infectious disease consult placed with recommendation to change antibiotic regimen to vancomycin at this time but if PCR returns positive for Pseudomonas to restart cefepime and also with instruction to contact infectious disease team if VRE shows up on cultures.   1/5: Discussed plan of care with patient,  Agrees with plan to continue antibiotics for now, agrees with plan for future amputation if necessary.  Reaffirms CODE STATUS as full code.      SUBJECTIVE:   No acute events overnight, pain with ambulation, seems to be slightly worse than yesterday.  Continued warmth in the knee, she states this has been persistent since the week prior to her arrival and does not seem to be improving.  No other acute pain, clinical changes.  States that she has been pressured by family to leave the hospital and seek traditional medicine with sertraline.  However, she states she would like to stay because \"she is scared.\"    OBJECTIVE:  Temp:  [36.5 °C (97.7 °F)-37.1 °C (98.8 °F)] 37 °C (98.6 °F)  Pulse:  [55-65] 57  Resp:  [12-18] 14  BP: (119-143)/(80-94) 143/87  SpO2:  [97 %-99 %] 97 %      Intake/Output Summary (Last 24 hours) at 1/6/2024 0707  Last data filed at 1/6/2024 0614  Gross per 24 hour   Intake 1840 ml "   Output 1250 ml   Net 590 ml         PHYSICAL EXAM:  Physical Exam  Vitals reviewed.   Constitutional:       General: She is not in acute distress.     Appearance: Normal appearance. She is normal weight. She is not toxic-appearing.   HENT:      Head: Normocephalic and atraumatic.      Right Ear: External ear normal. Decreased hearing noted.      Left Ear: External ear normal. Decreased hearing noted.      Nose: Nose normal. No congestion.      Mouth/Throat:      Mouth: Mucous membranes are moist.      Pharynx: Oropharynx is clear.   Eyes:      Extraocular Movements: Extraocular movements intact.      Conjunctiva/sclera: Conjunctivae normal.      Pupils: Pupils are equal, round, and reactive to light.   Cardiovascular:      Rate and Rhythm: Normal rate and regular rhythm.      Pulses: Normal pulses.      Heart sounds: Normal heart sounds. No murmur heard.     No friction rub. No gallop.   Pulmonary:      Effort: Pulmonary effort is normal. No respiratory distress.      Breath sounds: Normal breath sounds. No stridor. No wheezing, rhonchi or rales.   Abdominal:      General: Abdomen is flat. Bowel sounds are normal. There is no distension.      Palpations: Abdomen is soft.      Tenderness: There is no abdominal tenderness. There is no guarding or rebound.   Musculoskeletal:         General: Swelling present.      Cervical back: Normal range of motion and neck supple.      Comments: Left knee with swelling, moderate tenderness, erythema and slight rubor, large surgical scar to bilateral anterior knees.  Unchanged from prior exam.   Skin:     General: Skin is warm and dry.      Capillary Refill: Capillary refill takes less than 2 seconds.      Coloration: Skin is not jaundiced.      Findings: No bruising.      Comments: Old skin grafts present on BLE   Neurological:      General: No focal deficit present.      Mental Status: She is alert and oriented to person, place, and time. Mental status is at baseline.       "Cranial Nerves: No cranial nerve deficit.      Motor: No weakness.   Psychiatric:         Mood and Affect: Mood normal.         Behavior: Behavior normal.         Thought Content: Thought content normal.         Judgment: Judgment normal.         LABS:  Recent Labs     01/05/24 0338   WBC 4.4*   RBC 4.40   HEMOGLOBIN 12.1   HEMATOCRIT 37.1   MCV 84.3   MCH 27.5   RDW 45.8   PLATELETCT 89*   MPV 9.8   NEUTSPOLYS 60.70   LYMPHOCYTES 25.70   MONOCYTES 8.20   EOSINOPHILS 4.10   BASOPHILS 1.10       Recent Labs     01/05/24 0338   SODIUM 139   POTASSIUM 3.4*   CHLORIDE 113*   CO2 16*   BUN 20   CREATININE 0.74   CALCIUM 7.0*   ALBUMIN 2.0*       Estimated GFR/CRCL = Estimated Creatinine Clearance: 86.7 mL/min (by C-G formula based on SCr of 0.74 mg/dL).  Recent Labs     01/05/24 0338   GLUCOSE 118*       Recent Labs     01/05/24 0338   ASTSGOT 31   ALTSGPT 37   TBILIRUBIN 0.4   ALKPHOSPHAT 133*   GLOBULIN 3.6*               No results for input(s): \"INR\", \"APTT\", \"DDIMER\", \"FIBRINOGEN\" in the last 72 hours.        IMAGING:  DX-KNEE COMPLETE 4+ LEFT   Final Result         1.  No radiographic evidence of acute injury.      2. Diffuse soft tissue swelling about the knee especially in the suprapatellar region suspicious for inflammation and/or infection.      3. Previous revision of left total knee replacement.          CULTURES:   Results       Procedure Component Value Units Date/Time    FLUID CULTURE W/GRAM STAIN [[id number]]  (Abnormal) Collected: 01/02/24 1615    Order Status: Completed Specimen: Synovial Fluid Updated: 01/05/24 1106     Significant Indicator POS     Source SYNO     Site Left Knee     Culture Result -     Gram Stain Result Many WBCs.  No organisms seen.       Culture Result Corynebacterium striatum group  Rare growth      Narrative:      L knee  L knee    JOINT INFECTION PANEL [[id number]] Collected: 01/02/24 1615    Order Status: Sent Specimen: Body Fluid from Synovial Fluid Updated: 01/04/24 1305    " GRAM STAIN [[id number]] Collected: 01/02/24 1615    Order Status: Completed Specimen: Synovial Updated: 01/02/24 1834     Significant Indicator .     Source SYNO     Site Left Knee     Gram Stain Result Many WBCs.  No organisms seen.      Narrative:      L knee  L knee            MEDS:  Current Facility-Administered Medications   Medication Last Admin    gabapentin (Neurontin) capsule 600 mg 600 mg at 01/06/24 0420    oxyCODONE immediate-release (Roxicodone) tablet 5 mg 5 mg at 01/06/24 0316    MD Alert...Vancomycin per Pharmacy      vancomycin (Vancocin) 750 mg in  mL IVPB Stopped at 01/06/24 0423    senna-docusate (Pericolace Or Senokot S) 8.6-50 MG per tablet 2 Tablet 2 Tablet at 01/05/24 0515    And    polyethylene glycol/lytes (Miralax) Packet 1 Packet      And    magnesium hydroxide (Milk Of Magnesia) suspension 30 mL      And    bisacodyl (Dulcolax) suppository 10 mg      labetalol (Normodyne/Trandate) injection 10 mg      atorvastatin (Lipitor) tablet 20 mg 20 mg at 01/05/24 1955    furosemide (Lasix) tablet 20 mg 20 mg at 01/06/24 0420    metoprolol SR (Toprol XL) tablet 25 mg 25 mg at 01/04/24 2115    potassium chloride ER (Klor-Con) tablet 10 mEq 10 mEq at 01/06/24 0421    acetaminophen (Tylenol) tablet 1,000 mg 1,000 mg at 01/05/24 1444    Or    acetaminophen (Tylenol) suppository 650 mg      omeprazole (PriLOSEC) capsule 20 mg 20 mg at 01/06/24 0420    tiotropium (Spiriva Respimat) 2.5 mcg/Act inhalation spray 5 mcg 5 mcg at 01/06/24 0421    ferrous sulfate tablet 325 mg 325 mg at 01/06/24 0420    losartan (Cozaar) tablet 100 mg 100 mg at 01/06/24 0420    lactulose 20 GM/30ML solution 15 mL 15 mL at 01/06/24 0421       ASSESSMENT/PLAN:  63 y.o. female admitted for   * Septic arthritis (HCC)- (present on admission)  Assessment & Plan  Patient with history of septic arthritis in L knee with history of surgery to affected joint. S/p 6 week course of cefepime EOT 12/11. Plan was for possible aspiration  of joint following course of antibiotics and consideration of further antibiotics versus surgical management. Joint aspirated in ED. Cell count with ~20,000 nucleated cells (98% polys), 100k RBC, initial gram stain with WBCs no organisms.  Patient with 0 out of 4 SIRS criteria though does have evidence of endorgan dysfunction with elevated creatinine and altered mental status.  These are most likely secondary to poor p.o. intake and underlying cirrhosis though cannot rule out early sepsis.  Antibiotics administered in ED prior to blood cultures.  -Initially started on Cefepime 2g q8h   -Orthopedic surgery consulted, no current plans for surgery, will continue IV antibiotic therapy at this time while cultures finalize.  Given patient's poor response to antibiotics in the past, recommend patient will likely need amputation in the future.  -1/4 ID consulted, recommend switching coverage to vancomycin at this time, recommend to restart cefepime if psuedomonas shows up on the culture, request to be contacted if culture positive for VRE  -Need cultures finalized prior to DC to SNF, drawn 1/2, 5 days will be 1/7.  -As needed pain medication ordered  -If patient develops fever or hemodynamic instability will proceed with full sepsis workup, no fever since admission as of 1/6    AMS (altered mental status)- (present on admission)  Assessment & Plan  Patient oriented to person and event, not place or time. Per chart review, patient noted to be A&Ox4 last month representing a change from baseline. Likely hepatic encephalopathy 2/2 septic arthritis.  Elevated ammonia to 69 is not diagnostic however it does increase suspicion for hepatic encephalopathy.  Unlikely SBP given lack of fever, abdominal pain, ascites. Covered with cefepime. Patient with mild hyponatremia on admission, otherwise no electrolyte abnormality on BMP. Blood alcohol level negative  -Home lactulose ordered on admission, titrate to 2-3 soft BM per day with  concern for hepatic encephalopathy   -1/3 mental status appears dramatically improved.  Due to rapid improvement, more likely consistent with delirium, likely secondary to current infection  -1/6 patient remains A/Ox4  -Treating septic arthritis   -Delirium precautions  -Continue to monitor electrolytes and replete as indicated    ELIZABETH (acute kidney injury) (HCC)- (present on admission)  Assessment & Plan  Cr elevated to 1.26 on admission from baseline that ranges from 0.6-1.0.  Elevated BUN to creatinine ratio.  Likely prerenal in the setting of altered mental status and poor p.o. intake.  Patient with some bilateral lower extremity edema, without appreciable ascites and lungs are clear to auscultation.  Patient has echocardiogram from 1 year ago with near normal results.  Will dose fluids gently in setting of known cirrhosis.  - 1 L IV fluids overnight 1/2  - 1/3 Creatinine improved 0.94  - 1/5 creatinine 0.74  - Resolved, no need for additional monitoring unless clinically changed    Hypertension- (present on admission)  Assessment & Plan  Chronic, SBP ranging 120-145 since admission.  - Home losartan  - As needed antihypertensives ordered    Controlled type 2 diabetes mellitus with hyperglycemia, without long-term current use of insulin (HCC)- (present on admission)  Assessment & Plan  Patient with a history of type 2 diabetes.  Most recent A1c in the normal range at 5.4 in January 2023.  Not on outpatient medication.  Patient currently altered, pending SLP eval.  - Continue to monitor blood glucose on BMP, will consider initiating sliding scale insulin if sugars are elevated  - 1/3 AM glucose 104  - 1/5 AM glucose 118  - No need for additional monitoring unless signs of hypo/hyperglycemia present    Cirrhosis, alcoholic (HCC)- (present on admission)  Assessment & Plan  History of alcoholic cirrhosis. MELD-NA 1/5 is 11, <2% 3-month mortality risk. AMS present on admission has resolved.  -Home Lasix and  potassium  -Lactulose daily ordered at her home dosing.  See #altered mental status    GERD (gastroesophageal reflux disease)- (present on admission)  Assessment & Plan  - Home PPI    Hyperlipidemia- (present on admission)  Assessment & Plan  - Home statin    Paroxysmal atrial fibrillation, hx of- (present on admission)  Assessment & Plan  Patient on metoprolol, not on anticoagulation likely due to underlying coagulopathy in setting of cirrhosis.  - Pt in afib since admission, no RVR.  - Tele monitoring  - Home metoprolol         Core Measures:  Fluids: IV push only, no IV fluids  Lines: Peripheral IV for intravenous access  Abx: Vancomycin  Diet: regular diet  PPX: SCDs/TEDs and pharmacologic prophylaxis contraindicated due to hx of cirrhosis, coagulopathy    CODE Status: Full Code      Disposition  Patient is medically cleared pending finalized synovial fluid cultures 1/7  for discharge.   Anticipate discharge to skilled nursing facility.  I have placed the appropriate orders for post-discharge needs.    I have personally seen and examined the patient at bedside. I discussed the plan of care with patient, bedside RN, , and  Ruby Birmingham M.d..      Rod Dunn M.D.   PGY-1  [facility]

## 2024-01-06 NOTE — DISCHARGE PLANNING
Case Management Discharge Planning    Admission Date: 1/2/2024  GMLOS: 5.1  ALOS: 4    6-Clicks ADL Score: 16  6-Clicks Mobility Score: 14  PT and/or OT Eval ordered: Yes  Post-acute Referrals Ordered: Yes  Post-acute Choice Obtained: Yes  Has referral(s) been sent to post-acute provider:  Yes      Anticipated Discharge Dispo: Discharge Disposition: D/T to SNF with Medicare cert in anticipation of skilled care (03)    DME Needed: No    Action(s) Taken: Updated Provider/Nurse on Discharge Plan    Dr. Dunn was updated that Pt was accepted by [facility] but [facility] wants her to be off the IV Vanco. Dr. Dunn with reply that Pt will be on extended course of IV antibiotic. Dr. Dunn wants to know if Pt is LTAC appropriate.    RN LISETTE called and left VM for Isatu from [facility] if Pt can be eligible to LTAC.    1530, Spoke to Isatu from [facility] and she said they do not accept Medicaid.      Escalations Completed: None    Medically Clear: No    Next Steps: CM will continue to assist Pt with discharge needs.      Barriers to Discharge: Medical clearance    Is the patient up for discharge tomorrow: No

## 2024-01-06 NOTE — PROGRESS NOTES
"Infectious Disease Progress Note    Author: Griselda Vazquez M.D. Date & Time of service: 2024  11:46 AM    Chief Complaint:  Follow-up for left knee spacer infection    Interval History:    patient remains afebrile, count is 4.4, some improvement in the pain, tolerating vancomycin, cultures as below, creatinine 0.74   AF denies SE abx-c/o \"staples\" in surgical site left knee-also states plan is for PICC and 6 weeks IV abx    Labs Reviewed and Medications Reviewed.    Review of Systems:  Review of Systems   Constitutional:  Negative for fever.   Musculoskeletal:  Positive for joint pain.   All other systems reviewed and are negative.      Hemodynamics:  Temp (24hrs), Av.9 °C (98.4 °F), Min:36.5 °C (97.7 °F), Max:37.1 °C (98.8 °F)  Temperature: 37 °C (98.6 °F), Monitored Temp: 37.1 °C (98.8 °F)  Pulse  Av.2  Min: 55  Max: 99   Blood Pressure: 114/80       Physical Exam:  Physical Exam  Vitals and nursing note reviewed.   Constitutional:       General: She is not in acute distress.     Appearance: She is not ill-appearing, toxic-appearing or diaphoretic.   HENT:      Mouth/Throat:      Pharynx: No oropharyngeal exudate.      Comments: edentulous  Eyes:      General: No scleral icterus.     Conjunctiva/sclera: Conjunctivae normal.   Cardiovascular:      Rate and Rhythm: Normal rate.   Abdominal:      General: There is no distension.      Tenderness: There is no abdominal tenderness.   Musculoskeletal:         General: Swelling and deformity present.      Comments: L knee with swelling-mild warmth  Query palpable sutures along surgical site   Skin:     Coloration: Skin is not jaundiced.      Findings: Bruising present.   Neurological:      General: No focal deficit present.      Mental Status: She is alert and oriented to person, place, and time.         Meds:    Current Facility-Administered Medications:     gabapentin    oxyCODONE immediate-release    MD Alert...Vancomycin per Pharmacy    " vancomycin    senna-docusate **AND** polyethylene glycol/lytes **AND** magnesium hydroxide **AND** bisacodyl    labetalol    atorvastatin    furosemide    metoprolol SR    potassium chloride ER    acetaminophen **OR** acetaminophen    omeprazole    tiotropium    ferrous sulfate    losartan    lactulose    Labs:  Recent Labs     01/05/24  0338 01/06/24  0830   WBC 4.4* 5.0   RBC 4.40 4.93   HEMOGLOBIN 12.1 13.5   HEMATOCRIT 37.1 40.2   MCV 84.3 81.5   MCH 27.5 27.4   RDW 45.8 45.6   PLATELETCT 89* 81*   MPV 9.8 10.8   NEUTSPOLYS 60.70 62.50   LYMPHOCYTES 25.70 27.30   MONOCYTES 8.20 5.60   EOSINOPHILS 4.10 3.20   BASOPHILS 1.10 1.20       Recent Labs     01/05/24  0338   SODIUM 139   POTASSIUM 3.4*   CHLORIDE 113*   CO2 16*   GLUCOSE 118*   BUN 20       Recent Labs     01/05/24  0338   ALBUMIN 2.0*   TBILIRUBIN 0.4   ALKPHOSPHAT 133*   TOTPROTEIN 5.6*   ALTSGPT 37   ASTSGOT 31   CREATININE 0.74         Imaging:  DX-KNEE COMPLETE 4+ LEFT    Result Date: 1/2/2024 1/2/2024 3:43 PM HISTORY/REASON FOR EXAM:  Atraumatic Pain/Swelling/Deformity; history of recurrent joint infections TECHNIQUE/EXAM DESCRIPTION AND NUMBER OF VIEWS: 4 of the left knee COMPARISON: Left knee series 10/30/2023 FINDINGS: There has been previous revision of left total knee replacement with removal of the tibial component and placement of a short sneha extending from the distal femur into the proximal tibia. There is diffuse soft tissue swelling about the knee especially superior to the patella     1.  No radiographic evidence of acute injury. 2. Diffuse soft tissue swelling about the knee especially in the suprapatellar region suspicious for inflammation and/or infection. 3. Previous revision of left total knee replacement.      Micro:  Results       Procedure Component Value Units Date/Time    FLUID CULTURE W/GRAM STAIN [[id number]]  (Abnormal) Collected: 01/02/24 1614    Order Status: Completed Specimen: Synovial Fluid Updated: 01/05/24 6794      Significant Indicator POS     Source SYNO     Site Left Knee     Culture Result -     Gram Stain Result Many WBCs.  No organisms seen.       Culture Result Corynebacterium striatum group  Rare growth      Narrative:      L knee  L knee    JOINT INFECTION PANEL [[id number]] Collected: 01/02/24 1615    Order Status: Sent Specimen: Body Fluid from Synovial Fluid Updated: 01/04/24 1305    GRAM STAIN [[id number]] Collected: 01/02/24 1615    Order Status: Completed Specimen: Synovial Updated: 01/02/24 1834     Significant Indicator .     Source SYNO     Site Left Knee     Gram Stain Result Many WBCs.  No organisms seen.      Narrative:      L knee  L knee            Assessment:  April Alicia is a 63 y.o. female patient with history of alcohol use and remote history of cocaine use, asthma, multiple left knee prosthetic joint infections, first in July 2022 with left knee prosthetic joint septic arthritis and left native shoulder joint septic arthritis with group B strep status post I&D, incompletely treated and did not follow-up, had recurrent left knee prosthetic joint infection in December 2022, underwent explantation and placement of articulating spacer, also group B strep.  Given failure of multiple procedures, there is no immediate plan for second stage reimplantation given high risk for failure.  Patient received 6 weeks of IV ceftriaxone till 2/7/2023 and remained on chronic Augmentin with plan to continue at least through December 2023.  In October 2023, patient states that a drunk person in the park fell on her left knee and developed swelling and pain, fluid analysis consistent with infection, PCR and cultures were positive for Pseudomonas.  There was also late growth of a methicillin susceptible coagulase-negative Staphylococcus as well as a VRE faecium thought to be contaminants.  Her knee improved significantly with cefepime and she completed antibiotics on 12/11/2023.     Patient is now readmitted  once again with left knee infection.  Cultures growing Corynebacterium stratum thus far.     Pertinent Diagnoses:  Left knee joint infection, Corynebacterium  History of multiple left knee prosthetic joint infections  Recent history of Pseudomonas left knee spacer infection  Prior history of group B strep left knee prosthetic joint infection and left shoulder native joint septic arthritis  Controlled type 2 diabetes  Cirrhosis  History of alcohol use  Remote history of cocaine use    Plan:  -Cultures growing Corynebacterium striatum thus far.  Spoke to microbiology lab and they will send for susceptibilities.   -Continue IV vancomycin for now  -Orthopedics consulted, noted she has missed approximately 7 follow-up appointments with them and is a poor candidate with high likelihood for failure if yet another attempt of spacer exchange is made.  They are currently recommending suppressive antibiotics.    For current episode, we will need to follow clinical course over the next few days to see if some control of her infection can be made with IV antibiotics alone.  If not successful, she may still need a washout in order to be able to get to the point of suppressive antibiotics (if oral options for the corynebacterium are available).  Other options include above-the-knee amputation or a more palliative approach    -Please have Ortho eval knee-feels like nylon or other synthetic sutures along incision  Disposition: Unable to determine at this time  Need for PICC line: Unable to determine at this time

## 2024-01-06 NOTE — THERAPY
"Occupational Therapy   Initial Evaluation     Patient Name: April Alicia  Age:  63 y.o., Sex:  female  Medical Record #: [id number]  Today's Date: 1/5/2024     Precautions  Precautions: Fall Risk  Comments: WBAT per prior admission    Assessment    Patient is 63 y.o. female admitted for septic arthritis of L knee, pending possible amputation. Other pertinent medical history includes alcoholic cirrhosis, multiple prior orthopedic surgeries, DM, afib, and HTN. Pt seen for OT evaluation. Pt donned/doffed socks with min A, performed toilet transfer w/ min A, performed toilet hygiene w/ mod A, and performed standing g/h w/ supv. When asked about missed follow up appointments, pt stated \"I did not know they were scheduled\". Pt lives with her son in an apartment. Pt's son able to assist as needed when he is home from work. Pt educated regarding the role of OT. Pt current functional performance limited by impaired activity tolerance, impaired balance, impaired cognition, and pain. Pt will benefit from skilled OT while admitted to acute care.     Plan    Occupational Therapy Initial Treatment Plan   Treatment Interventions: Self Care / Activities of Daily Living, Adaptive Equipment, Neuro Re-Education / Balance, Therapeutic Exercises, Therapeutic Activity  Treatment Frequency: 3 Times per Week  Duration: Until Therapy Goals Met    DC Equipment Recommendations: Unable to determine at this time  Discharge Recommendations: Recommend post-acute placement for additional occupational therapy services prior to discharge home     Subjective    \"I didn't know I had appointments.\"     Objective     01/05/24 1053   Prior Living Situation   Prior Services None;Intermittent Physical Support for ADL Per Service   Housing / Facility 1 Story House   Steps Into Home 0   Bathroom Set up Walk In Shower;Shower Chair   Equipment Owned Front-Wheel Walker;4-Wheel Walker;Tub / Shower Seat   Lives with - Patient's Self Care Capacity Adult " Children   Comments Pt lives with her son who assists when home. Pt's son works.   Prior Level of ADL Function   Self Feeding Independent   Grooming / Hygiene Independent   Bathing Independent   Dressing Independent   Toileting Independent   Prior Level of IADL Function   Medication Management Independent   Laundry Independent   Kitchen Mobility Independent   Finances Independent   Home Management Requires Assist  (occasionally)   Shopping Requires Assist  (occasionally)   Prior Level Of Mobility Independent Without Device in Community;Independent Without Device in Home   Driving / Transportation Driving Independent   History of Falls   History of Falls No   Date of Last Fall   (denied falls)   Precautions   Precautions Fall Risk   Pain   Pain Scales 0 to 10 Scale    Pain 0 - 10 Group   Therapist Pain Assessment Nurse Notified;Post Activity Pain Same as Prior to Activity  (not rated, agreeable to session)   Cognition    Cognition / Consciousness X   Level of Consciousness Alert   New Learning Impaired   Attention Impaired   Comments Pleasant and cooperative, reported missing appointments due to to not recalling having them scheduled   Passive ROM Upper Body   Passive ROM Upper Body WDL   Active ROM Upper Body   Active ROM Upper Body  WDL   Strength Upper Body   Upper Body Strength  WDL   Sensation Upper Body   Upper Extremity Sensation  WDL   Upper Body Muscle Tone   Upper Body Muscle Tone  WDL   Coordination Upper Body   Coordination WDL   Balance Assessment   Sitting Balance (Static) Fair   Sitting Balance (Dynamic) Fair   Standing Balance (Static) Fair -   Standing Balance (Dynamic) Fair -   Weight Shift Sitting Fair   Weight Shift Standing Fair   Comments w/ FWW   Bed Mobility    Supine to Sit Minimal Assist   Sit to Supine   (up to chair post)   Scooting Supervised   Rolling Supervised   Comments HOB slightly elevated   ADL Assessment   Grooming Contact Guard Assist;Standing  (washed hands at sink)   Lower Body  Dressing Minimal Assist  (don/doff socks)   Toileting Moderate Assist  (urine and stool on toilet)   Functional Mobility   Sit to Stand Standby Assist   Bed, Chair, Wheelchair Transfer Standby Assist   Toilet Transfers Minimal Assist   Transfer Method Stand Step   Mobility EOB>Bathroom>chair   Comments w/ FWW   Visual Perception   Visual Perception  Not Tested   Edema / Skin Assessment   Edema / Skin    (noted blood while pt performing toilet hygiene, noted small skin tear on L side of bottom; RN notified)   Activity Tolerance   Sitting in Chair up to chair post   Sitting Edge of Bed <5 min   Standing >5 min   Comments limited by weakness/pain   Patient / Family Goals   Patient / Family Goal #1 to go home   Short Term Goals   Short Term Goal # 1 Pt will perform LB dressing with supv   Short Term Goal # 2 Pt will perform ADL transfer w/ supv   Short Term Goal # 3 Pt will perform standing g/h w/ supv   Short Term Goal # 4 Pt will perform toilet hygiene w/ supv   Education Group   Education Provided Role of Occupational Therapist;Activities of Daily Living   Role of Occupational Therapist Patient Response Patient;Acceptance;Explanation;Verbal Demonstration   ADL Patient Response Patient;Acceptance;Explanation;Demonstration;Verbal Demonstration;Action Demonstration   Occupational Therapy Initial Treatment Plan    Treatment Interventions Self Care / Activities of Daily Living;Adaptive Equipment;Neuro Re-Education / Balance;Therapeutic Exercises;Therapeutic Activity   Treatment Frequency 3 Times per Week   Duration Until Therapy Goals Met   Problem List   Problem List Decreased Active Daily Living Skills;Decreased Homemaking Skills;Decreased Functional Mobility;Decreased Activity Tolerance;Impaired Postural Control / Balance

## 2024-01-06 NOTE — THERAPY
Physical Therapy   Initial Evaluation     Patient Name: April Alicia  Age:  63 y.o., Sex:  female  Medical Record #: [id number]  Today's Date: 1/5/2024     Precautions  Precautions: Fall Risk  Comments: WBAT    Assessment  Pt presents with impaired activity tolerance, dynamic balance, strength and with likely metabolic encephalopathy in setting cirrhosis, afib, ELIZABETH, DM and multiple left knee surgeries/infections. Ongoing decision for antiobotics vs amp, appears ortho prefers amp given follow up issues, ID reporting unlikely to suppress infection well enough; when asked, pt reports she was not aware of her appointments for follow up; physically, fairly motivated, antalgic gait but able to walk short household distances with contact guard and FWW; painful gait; anticipate dc to be dictated by medical plan of care of antibiotics vs amp; will monitor for decision, from a PT perspective, she is likely near her most recent baseline but would benefit from PT in the next setting.     Plan    Physical Therapy Initial Treatment Plan   Treatment Plan : Equipment, Bed Mobility, Manual Therapy, Gait Training, Neuro Re-Education / Balance, Self Care / Home Evaluation, Stair Training, Therapeutic Activities, Therapeutic Exercise  Treatment Frequency: 4 Times per Week  Duration: Until Therapy Goals Met    DC Equipment Recommendations: None  Discharge Recommendations: Other -defer to medical/IV antibiotic plan of care for location of follow up; will update PT recs once established as if goes for amp will likely need SNF if does not will likely reach function to return home with home health.        Abridged Subjective/Objective     01/05/24 1101   Prior Living Situation   Prior Services None;Intermittent Physical Support for ADL Per Service   Housing / Facility 1 Story House   Steps Into Home   (ramp)   Equipment Owned 4-Wheel Walker;Front-Wheel Walker  ('a walker thats a w/c too')   Comments reports living with a friend  that works during the day but can otherwise help; denies recent falls; reports home is w/c accessible; reports she stepped on her hearing aides and has not been able to replace   Prior Level of Functional Mobility   Bed Mobility Independent   Transfer Status Independent   Ambulation Independent   Ambulation Distance household   Assistive Devices Used 4-Wheel Walker   Cognition    Cognition / Consciousness X   Speech/ Communication Hard of Hearing   Level of Consciousness Alert   New Learning Impaired   Comments cooperative; a bit self directive but relatively receptive; motivated to move; when asked why she did not follow up with orthopedics she reports she was unaware of appointments   Passive ROM Lower Body   Passive ROM Lower Body X   Comments left knee at least 90 deg, painful wiht Long arc quads   Strength Lower Body   Lower Body Strength  X   Gross Strength Generalized Weakness, Equal Bilaterally   Comments knees painful 3+/5   Sensation Lower Body   Lower Extremity Sensation   WDL   Comments denies t/n but also has several skin grafts and reports her legs 'tried to go to sleep on me in the bed' and had to wake them up/ciruclation   Balance Assessment   Sitting Balance (Static) Fair +   Sitting Balance (Dynamic) Fair   Standing Balance (Static) Fair -   Standing Balance (Dynamic) Poor +   Weight Shift Sitting Good   Weight Shift Standing Fair   Comments B UE support in sitting/standing; step to pattern initially wiht heavy reliance on arms; no loss of balance in room   Bed Mobility    Supine to Sit Contact Guard Assist   Sit to Supine   (NT, in chiar post)   Gait Analysis   Gait Level Of Assist Contact Guard Assist   Assistive Device Front Wheel Walker   Distance (Feet) 50   # of Times Distance was Traveled 1   Deviation Antalgic;Step To;Decreased Base Of Support   Weight Bearing Status wbat   Vision Deficits Impacting Mobility none noted   Comments distance limited by pt, able to progress FWW rolling and step  through with cues for weight through legs not arms;   Functional Mobility   Sit to Stand Contact Guard Assist  (with FWW)   Bed, Chair, Wheelchair Transfer Standby Assist  (with FWW)   Edema / Skin Assessment   Edema / Skin  X   Comments diffuse scaring and incisions of knees as well as skin grafts   Patient / Family Goals    Patient / Family Goal #1 to improve strength   Short Term Goals    Short Term Goal # 1 Pt will perform supine<>sit from flat HOB/no railing with supervision within 6 vists to ensure independent mobility at home.   Short Term Goal # 2 Pt will perform sit<>stand with FWW and supervision within 6 vistis to ensure independent mobility at home.   Short Term Goal # 3 Pt will ambulate x 150ft with FWW and supervision within 6 visits to ensure idnependent mobility at home.   Education Group   Role of Physical Therapist Patient Response Patient;Acceptance;Demonstration;Explanation;Verbal Demonstration;Action Demonstration   Gait Training Patient Response Patient;Acceptance;Explanation;Demonstration;Verbal Demonstration;Action Demonstration;Reinforcement Needed   Additional Comments long arc quads

## 2024-01-07 ENCOUNTER — APPOINTMENT (OUTPATIENT)
Dept: CARDIOLOGY | Facility: MEDICAL CENTER | Age: 64
DRG: 559 | End: 2024-01-07
Payer: MEDICAID

## 2024-01-07 PROBLEM — M17.12 PRIMARY OSTEOARTHRITIS OF LEFT KNEE: Status: RESOLVED | Noted: 2020-08-25 | Resolved: 2024-01-07

## 2024-01-07 PROBLEM — J45.20 MILD INTERMITTENT ASTHMA WITHOUT COMPLICATION: Status: ACTIVE | Noted: 2022-08-04

## 2024-01-07 PROBLEM — K70.30 CIRRHOSIS, ALCOHOLIC (HCC): Chronic | Status: ACTIVE | Noted: 2017-09-04

## 2024-01-07 PROBLEM — R09.89 ABNORMAL FINDING OF LUNG: Status: RESOLVED | Noted: 2022-12-27 | Resolved: 2024-01-07

## 2024-01-07 PROBLEM — Z90.710 H/O: HYSTERECTOMY: Status: RESOLVED | Noted: 2019-12-05 | Resolved: 2024-01-07

## 2024-01-07 PROBLEM — M00.9 SEPTIC JOINT (HCC): Status: RESOLVED | Noted: 2022-12-26 | Resolved: 2024-01-07

## 2024-01-07 PROBLEM — J44.1 ACUTE EXACERBATION OF CHRONIC OBSTRUCTIVE PULMONARY DISEASE (COPD) (HCC): Status: RESOLVED | Noted: 2020-01-27 | Resolved: 2024-01-07

## 2024-01-07 PROBLEM — G44.40 MEDICATION OVERUSE HEADACHE: Status: RESOLVED | Noted: 2017-08-15 | Resolved: 2024-01-07

## 2024-01-07 PROBLEM — M00.9 SEPTIC ARTHRITIS OF KNEE, LEFT (HCC): Status: RESOLVED | Noted: 2022-07-19 | Resolved: 2024-01-07

## 2024-01-07 PROBLEM — E43 PROTEIN-CALORIE MALNUTRITION, SEVERE (HCC): Status: RESOLVED | Noted: 2022-08-01 | Resolved: 2024-01-07

## 2024-01-07 LAB
ALBUMIN SERPL BCP-MCNC: 2.4 G/DL (ref 3.2–4.9)
ALBUMIN/GLOB SERPL: 0.6 G/DL
ALP SERPL-CCNC: 206 U/L (ref 30–99)
ALT SERPL-CCNC: 31 U/L (ref 2–50)
ANION GAP SERPL CALC-SCNC: 11 MMOL/L (ref 7–16)
APTT PPP: 39.4 SEC (ref 24.7–36)
AST SERPL-CCNC: 27 U/L (ref 12–45)
BASOPHILS # BLD AUTO: 1.1 % (ref 0–1.8)
BASOPHILS # BLD: 0.05 K/UL (ref 0–0.12)
BILIRUB SERPL-MCNC: 0.4 MG/DL (ref 0.1–1.5)
BUN SERPL-MCNC: 26 MG/DL (ref 8–22)
CALCIUM ALBUM COR SERPL-MCNC: 7.9 MG/DL (ref 8.5–10.5)
CALCIUM SERPL-MCNC: 6.6 MG/DL (ref 8.5–10.5)
CHLORIDE SERPL-SCNC: 109 MMOL/L (ref 96–112)
CO2 SERPL-SCNC: 16 MMOL/L (ref 20–33)
CREAT SERPL-MCNC: 0.89 MG/DL (ref 0.5–1.4)
EKG IMPRESSION: NORMAL
EOSINOPHIL # BLD AUTO: 0.17 K/UL (ref 0–0.51)
EOSINOPHIL NFR BLD: 3.6 % (ref 0–6.9)
ERYTHROCYTE [DISTWIDTH] IN BLOOD BY AUTOMATED COUNT: 45.9 FL (ref 35.9–50)
GFR SERPLBLD CREATININE-BSD FMLA CKD-EPI: 72 ML/MIN/1.73 M 2
GLOBULIN SER CALC-MCNC: 3.9 G/DL (ref 1.9–3.5)
GLUCOSE SERPL-MCNC: 174 MG/DL (ref 65–99)
HCT VFR BLD AUTO: 34.4 % (ref 37–47)
HGB BLD-MCNC: 11.5 G/DL (ref 12–16)
IMM GRANULOCYTES # BLD AUTO: 0.03 K/UL (ref 0–0.11)
IMM GRANULOCYTES NFR BLD AUTO: 0.6 % (ref 0–0.9)
INR PPP: 1.41 (ref 0.87–1.13)
LV EJECT FRACT MOD 2C 99903: 52.66
LV EJECT FRACT MOD 4C 99902: 54.11
LV EJECT FRACT MOD BP 99901: 52.36
LYMPHOCYTES # BLD AUTO: 1.43 K/UL (ref 1–4.8)
LYMPHOCYTES NFR BLD: 30.1 % (ref 22–41)
MCH RBC QN AUTO: 27.4 PG (ref 27–33)
MCHC RBC AUTO-ENTMCNC: 33.4 G/DL (ref 32.2–35.5)
MCV RBC AUTO: 81.9 FL (ref 81.4–97.8)
MONOCYTES # BLD AUTO: 0.33 K/UL (ref 0–0.85)
MONOCYTES NFR BLD AUTO: 6.9 % (ref 0–13.4)
NEUTROPHILS # BLD AUTO: 2.74 K/UL (ref 1.82–7.42)
NEUTROPHILS NFR BLD: 57.7 % (ref 44–72)
NRBC # BLD AUTO: 0 K/UL
NRBC BLD-RTO: 0 /100 WBC (ref 0–0.2)
NT-PROBNP SERPL IA-MCNC: 1656 PG/ML (ref 0–125)
PLATELET # BLD AUTO: 73 K/UL (ref 164–446)
PLATELETS.RETICULATED NFR BLD AUTO: 1.4 % (ref 0.6–13.1)
PMV BLD AUTO: 9.9 FL (ref 9–12.9)
POTASSIUM SERPL-SCNC: 3.4 MMOL/L (ref 3.6–5.5)
PROT SERPL-MCNC: 6.3 G/DL (ref 6–8.2)
PROTHROMBIN TIME: 17.4 SEC (ref 12–14.6)
RBC # BLD AUTO: 4.2 M/UL (ref 4.2–5.4)
SODIUM SERPL-SCNC: 136 MMOL/L (ref 135–145)
VANCOMYCIN TROUGH SERPL-MCNC: 15 UG/ML (ref 10–20)
WBC # BLD AUTO: 4.8 K/UL (ref 4.8–10.8)

## 2024-01-07 PROCEDURE — 700102 HCHG RX REV CODE 250 W/ 637 OVERRIDE(OP): Mod: JZ

## 2024-01-07 PROCEDURE — 99232 SBSQ HOSP IP/OBS MODERATE 35: CPT | Mod: GC | Performed by: FAMILY MEDICINE

## 2024-01-07 PROCEDURE — A9270 NON-COVERED ITEM OR SERVICE: HCPCS

## 2024-01-07 PROCEDURE — 700102 HCHG RX REV CODE 250 W/ 637 OVERRIDE(OP)

## 2024-01-07 PROCEDURE — 700102 HCHG RX REV CODE 250 W/ 637 OVERRIDE(OP): Performed by: FAMILY MEDICINE

## 2024-01-07 PROCEDURE — 80202 ASSAY OF VANCOMYCIN: CPT

## 2024-01-07 PROCEDURE — 700111 HCHG RX REV CODE 636 W/ 250 OVERRIDE (IP): Performed by: INTERNAL MEDICINE

## 2024-01-07 PROCEDURE — 80053 COMPREHEN METABOLIC PANEL: CPT

## 2024-01-07 PROCEDURE — A9270 NON-COVERED ITEM OR SERVICE: HCPCS | Mod: JZ

## 2024-01-07 PROCEDURE — 93306 TTE W/DOPPLER COMPLETE: CPT | Mod: 26 | Performed by: STUDENT IN AN ORGANIZED HEALTH CARE EDUCATION/TRAINING PROGRAM

## 2024-01-07 PROCEDURE — 93010 ELECTROCARDIOGRAM REPORT: CPT | Performed by: STUDENT IN AN ORGANIZED HEALTH CARE EDUCATION/TRAINING PROGRAM

## 2024-01-07 PROCEDURE — 97602 WOUND(S) CARE NON-SELECTIVE: CPT

## 2024-01-07 PROCEDURE — 770020 HCHG ROOM/CARE - TELE (206)

## 2024-01-07 PROCEDURE — 85025 COMPLETE CBC W/AUTO DIFF WBC: CPT

## 2024-01-07 PROCEDURE — 85610 PROTHROMBIN TIME: CPT

## 2024-01-07 PROCEDURE — 700105 HCHG RX REV CODE 258: Performed by: INTERNAL MEDICINE

## 2024-01-07 PROCEDURE — 85055 RETICULATED PLATELET ASSAY: CPT

## 2024-01-07 PROCEDURE — 99233 SBSQ HOSP IP/OBS HIGH 50: CPT | Performed by: INTERNAL MEDICINE

## 2024-01-07 PROCEDURE — 85730 THROMBOPLASTIN TIME PARTIAL: CPT

## 2024-01-07 PROCEDURE — 36415 COLL VENOUS BLD VENIPUNCTURE: CPT

## 2024-01-07 PROCEDURE — 93306 TTE W/DOPPLER COMPLETE: CPT

## 2024-01-07 PROCEDURE — 700111 HCHG RX REV CODE 636 W/ 250 OVERRIDE (IP): Mod: JZ

## 2024-01-07 PROCEDURE — A9270 NON-COVERED ITEM OR SERVICE: HCPCS | Performed by: FAMILY MEDICINE

## 2024-01-07 PROCEDURE — 83880 ASSAY OF NATRIURETIC PEPTIDE: CPT

## 2024-01-07 PROCEDURE — 93005 ELECTROCARDIOGRAM TRACING: CPT

## 2024-01-07 RX ORDER — CALCIUM GLUCONATE 20 MG/ML
2 INJECTION, SOLUTION INTRAVENOUS ONCE
Status: COMPLETED | OUTPATIENT
Start: 2024-01-07 | End: 2024-01-07

## 2024-01-07 RX ORDER — POTASSIUM CHLORIDE 20 MEQ/1
40 TABLET, EXTENDED RELEASE ORAL DAILY
Status: DISCONTINUED | OUTPATIENT
Start: 2024-01-07 | End: 2024-01-09 | Stop reason: HOSPADM

## 2024-01-07 RX ORDER — CALCIUM GLUCONATE 20 MG/ML
1 INJECTION, SOLUTION INTRAVENOUS ONCE
Status: COMPLETED | OUTPATIENT
Start: 2024-01-07 | End: 2024-01-07

## 2024-01-07 RX ADMIN — ACETAMINOPHEN 1000 MG: 500 TABLET ORAL at 18:38

## 2024-01-07 RX ADMIN — GABAPENTIN 600 MG: 300 CAPSULE ORAL at 20:45

## 2024-01-07 RX ADMIN — GABAPENTIN 600 MG: 300 CAPSULE ORAL at 13:22

## 2024-01-07 RX ADMIN — VANCOMYCIN HYDROCHLORIDE 750 MG: 5 INJECTION, POWDER, LYOPHILIZED, FOR SOLUTION INTRAVENOUS at 14:14

## 2024-01-07 RX ADMIN — FUROSEMIDE 20 MG: 20 TABLET ORAL at 05:01

## 2024-01-07 RX ADMIN — CALCIUM GLUCONATE 1 G: 20 INJECTION, SOLUTION INTRAVENOUS at 18:41

## 2024-01-07 RX ADMIN — OMEPRAZOLE 20 MG: 20 CAPSULE, DELAYED RELEASE ORAL at 05:01

## 2024-01-07 RX ADMIN — FERROUS SULFATE TAB 325 MG (65 MG ELEMENTAL FE) 325 MG: 325 (65 FE) TAB at 05:01

## 2024-01-07 RX ADMIN — ATORVASTATIN CALCIUM 20 MG: 20 TABLET, FILM COATED ORAL at 20:45

## 2024-01-07 RX ADMIN — GABAPENTIN 600 MG: 300 CAPSULE ORAL at 05:00

## 2024-01-07 RX ADMIN — POTASSIUM CHLORIDE 10 MEQ: 750 TABLET, EXTENDED RELEASE ORAL at 05:29

## 2024-01-07 RX ADMIN — OXYCODONE HYDROCHLORIDE 5 MG: 5 TABLET ORAL at 12:30

## 2024-01-07 RX ADMIN — POTASSIUM CHLORIDE 40 MEQ: 1500 TABLET, EXTENDED RELEASE ORAL at 17:03

## 2024-01-07 RX ADMIN — ACETAMINOPHEN 1000 MG: 500 TABLET ORAL at 06:22

## 2024-01-07 RX ADMIN — OXYCODONE HYDROCHLORIDE 5 MG: 5 TABLET ORAL at 18:39

## 2024-01-07 RX ADMIN — CALCIUM GLUCONATE 2 G: 20 INJECTION, SOLUTION INTRAVENOUS at 17:10

## 2024-01-07 RX ADMIN — VANCOMYCIN HYDROCHLORIDE 750 MG: 5 INJECTION, POWDER, LYOPHILIZED, FOR SOLUTION INTRAVENOUS at 01:58

## 2024-01-07 RX ADMIN — DOCUSATE SODIUM 50 MG AND SENNOSIDES 8.6 MG 2 TABLET: 8.6; 5 TABLET, FILM COATED ORAL at 05:00

## 2024-01-07 RX ADMIN — OXYCODONE HYDROCHLORIDE 5 MG: 5 TABLET ORAL at 06:23

## 2024-01-07 RX ADMIN — TIOTROPIUM BROMIDE INHALATION SPRAY 5 MCG: 3.12 SPRAY, METERED RESPIRATORY (INHALATION) at 05:09

## 2024-01-07 RX ADMIN — ACETAMINOPHEN 1000 MG: 500 TABLET ORAL at 12:30

## 2024-01-07 RX ADMIN — LOSARTAN POTASSIUM 100 MG: 50 TABLET, FILM COATED ORAL at 05:00

## 2024-01-07 ASSESSMENT — PAIN DESCRIPTION - PAIN TYPE
TYPE: ACUTE PAIN;CHRONIC PAIN
TYPE: ACUTE PAIN
TYPE: ACUTE PAIN

## 2024-01-07 ASSESSMENT — ENCOUNTER SYMPTOMS: FEVER: 0

## 2024-01-07 NOTE — PROGRESS NOTES
Monitor summary:        Rhythm: AFIB  Rate: , up to 160  Ectopy: (R)PVC, (r)COUP  Measurements: 0./.06/.48        12hr chart check

## 2024-01-07 NOTE — CARE PLAN
The patient is Stable - Low risk of patient condition declining or worsening    Shift Goals  Clinical Goals: Abx, infection control  Patient Goals: pain management  Family Goals: na      Problem: Fall Risk  Goal: Patient will remain free from falls  Outcome: Progressing   Patient using call light appropriately. Stand by assist during ambulation. Patient has remained free from falls during shift.

## 2024-01-07 NOTE — PROGRESS NOTES
Pharmacy Vancomycin Kinetics Note for 1/7/2024     63 y.o. female on Vancomycin day # 4       Vancomycin Indication (Two level): Severe or complicated infection (goal trough 15-20) (septic L knee)    Provider specified end date: 02/15/24    Active Antibiotics (From admission, onward)      Ordered     Ordering Provider       Thu Jan 4, 2024  2:42 PM    01/04/24 1442  vancomycin (Vancocin) 750 mg in  mL IVPB  (vancomycin (VANCOCIN) IV (LD + Maintenance))  EVERY 12 HOURS         Carmelo Amezquita M.D.       Thu Jan 4, 2024 12:37 PM    01/04/24 1237  MD Alert...Vancomycin per Pharmacy  PHARMACY TO DOSE        Question:  Indication(s) for vancomycin?  Answer:  Osteomyelitis    Carmelo Amezquita M.D.            Dosing Weight: 84 kg (185 lb 3 oz)      Admission History: Admitted on 1/2/2024 for Septic arthritis (HCC) [M00.9]  Pertinent history: Hx of multiple L knee PJI's, most recently grew PsAr (& VRE/CoNS throught to be contaminants), s/p 6 weeks of cefepime through 12/11. Admitted now w/ recurrent septic L knee & was started on cefepime s/p L knee arthrocentesis, now growing Corynebacterium striatum. Cefepime transitioned to vancomycin for 6 weeks total, through 12/15 per ID recs. Pt is a poor candidate for surgical intervention and may need suppressive antibiotic therapy    Allergies:     Nkda [no known drug allergy]     Pertinent cultures to date:     Results       Procedure Component Value Units Date/Time    FLUID CULTURE W/GRAM STAIN [[id number]]  (Abnormal) Collected: 01/02/24 1615    Order Status: Completed Specimen: Synovial Fluid Updated: 01/05/24 1106     Significant Indicator POS     Source SYNO     Site Left Knee     Culture Result -     Gram Stain Result Many WBCs.  No organisms seen.       Culture Result Corynebacterium striatum group  Rare growth      Narrative:      L knee  L knee    JOINT INFECTION PANEL [[id number]] Collected: 01/02/24 1615    Order Status: Sent Specimen: Body Fluid from Synovial  "Fluid Updated: 24 1305    GRAM STAIN [[id number]] Collected: 24 1615    Order Status: Completed Specimen: Synovial Updated: 24 1834     Significant Indicator .     Source SYNO     Site Left Knee     Gram Stain Result Many WBCs.  No organisms seen.      Narrative:      L knee  L knee            Labs:     Estimated Creatinine Clearance: 86.7 mL/min (by C-G formula based on SCr of 0.74 mg/dL).  Recent Labs     24  0338 24  0830 24  0142   WBC 4.4* 5.0 4.8   NEUTSPOLYS 60.70 62.50 57.70     Recent Labs     24  0338   BUN 20   CREATININE 0.74   ALBUMIN 2.0*       Intake/Output Summary (Last 24 hours) at 2024 0948  Last data filed at 2024 0534  Gross per 24 hour   Intake 120 ml   Output 800 ml   Net -680 ml      BP 92/65   Pulse 66   Temp 36.9 °C (98.4 °F) (Temporal)   Resp 18   Ht 1.702 m (5' 7\")   Wt 84.1 kg (185 lb 6.5 oz)   SpO2 98%  Temp (24hrs), Av.1 °C (98.7 °F), Min:36.8 °C (98.2 °F), Max:37.4 °C (99.3 °F)      List concerns for Vancomycin clearance:     BUN/Scr ratio greater than 20:1;Obesity    Pharmacokinetics:     Two level kinetics:   Ke (hr ^-1): 0.0601  Half life: 11.5  Vd: Steady state Vd : 53.101  Calculated AUC: 470 mg·hr/L    Trough kinetics:   Recent Labs     24  1753 24  014   VANCOTROUGH  --  15.0   VANCOPEAK 24.0  --        A/P:     -  Vancomycin dose: continue 750 mg q12h (0200/1400)    -  Next vancomycin level(s):    -TBD, ~5 day or sooner if clinical changes    -  Predicted vancomycin AUC from initial AUC test calculator: 493     -  Calculated vancomycin AUC from two level test calculator: 470  mg·hr/L    -  Comments: vancomycin peak/trough were drawn appropriately. AUC is therapeutic and appropriate therefore will continue current regimen     Kathy Hewitt, PharmD    "

## 2024-01-07 NOTE — CARE PLAN
The patient is Stable - Low risk of patient condition declining or worsening    Shift Goals  Clinical Goals: Safety, Pain Control, Abx  Patient Goals: Pain control  Family Goals: na    Progress made toward(s) clinical / shift goals:        Problem: Pain - Standard  Goal: Alleviation of pain or a reduction in pain to the patient’s comfort goal  Outcome: Progressing     Problem: Knowledge Deficit - Standard  Goal: Patient and family/care givers will demonstrate understanding of plan of care, disease process/condition, diagnostic tests and medications  Outcome: Progressing     Problem: Hemodynamics  Goal: Patient's hemodynamics, fluid balance and neurologic status will be stable or improve  Outcome: Progressing     Problem: Skin Integrity  Goal: Skin integrity is maintained or improved  Outcome: Progressing     Problem: Fall Risk  Goal: Patient will remain free from falls  Outcome: Progressing       Patient is not progressing towards the following goals:

## 2024-01-07 NOTE — WOUND TEAM
[facility]  Inpatient Services  Wound and Skin Care Brief Evaluation    Admission Date: 1/2/2024     Last order of IP CONSULT TO WOUND CARE was found on 1/5/2024 from Hospital Encounter on 1/2/2024     HPI, PMH, SH: Reviewed    Chief Complaint   Patient presents with    Knee Pain     Lt       Fatigue     Diagnosis: Septic arthritis (HCC) [M00.9]    Unit where seen by Wound Team: T712/02     Wound consult placed regarding left buttock. Chart and images reviewed. This discussed with bedside RN. This clinician in to assess patient. Patient pleasant and agreeable. Patient able to stand with little assistance with FWW. Non-selectively debrided with No rinse foam soap and Moist warm washcloth.     No pressure injuries or advanced wound care needs identified. Bilateral heels assessed, pink intact and blanching throughout. Wound consult completed. No further follow up unless indicated and consulted.     Moisture Associated Skin Damage 01/07/24 Buttock (Active)   First Observed Date/First Observed Time: 01/07/24 1001   Present on Original Admission: Yes  Laterality: Left  Wound Location : Buttock      Assessments 1/7/2024 10:00 AM   Wound Image      NEXT Weekly Photo (Inpatient Only) 01/17/24   Drainage Amount Scant   Drainage Description Serous;Serosanguineous   Periwound Assessment Denuded   IAD Cleansing Foam Cleanser/Washcloth   Periwound Protectant Barrier Paste   IAD Containment Device Purewick   Length (cm) 1   Width (cm) 1   WOUND NURSE ONLY - Time Spent with Patient (mins) 20       PREVENTATIVE INTERVENTIONS:    Q shift Sincere - performed per nursing policy  Q shift pressure point assessments - performed per nursing policy    Surface/Positioning  Standard/trauma mattress - Currently in Place  TAPs Turning system - Currently in Place  Waffle overlay  - Currently in Place    Offloading/Redistribution  Heels floated with waffle overlay - Currently in Place  Float Heels off Bed with Pillows - Currently  in Place           Respiratory  N/A    Containment/Moisture Prevention    Purwick/Condom Cath - Currently in Place  Barrier paste - Applied this Visit    Mobilization      Up to chair and Ambulate

## 2024-01-07 NOTE — PROGRESS NOTES
"    FAMILY MEDICINE PROGRESS NOTE          PATIENT ID:  NAME:  April Alicia  MRN:               [id number]  YOB: 1960    Date of Admission: 1/2/2024     Attending: Ruby Birmingham M.d.     Resident: Rod Dunn M.D. (PGY-1)    Primary Care Physician:  Anum Gatica M.D.    HPI: April Alicia is a 63 y.o. female with a past medical history of alcoholic cirrhosis, multiple orthopedic surgeries, diet controlled type 2 diabetes, rate controlled atrial fibrillation, history of septic arthritis of left knee after surgical revision admitted for septic arthritis of left knee status post 6 weeks of Cefepime on hospital day 5    Interval Problem Update  1/4: formal orthopedic consult placed, infectious disease consult placed with recommendation to change antibiotic regimen to vancomycin at this time but if PCR returns positive for Pseudomonas to restart cefepime and also with instruction to contact infectious disease team if VRE shows up on cultures.   1/5: Discussed plan of care with patient,  Agrees with plan to continue antibiotics for now, agrees with plan for future amputation if necessary.  Reaffirms CODE STATUS as full code.    1/6: Discussion with Dr. Luz, Orthopedics. Recommends amputation if abx not showing improvement, does not recommend any more revisions as she states they are high risk for each operation and does not seem to be providing any benefit. Pt agreeable to amputation \"if needed,\" but still wants some time to decide when she is ready.     SUBJECTIVE:   No acute events overnight, patient able to get up and walk briefly, still complaining of some pain with standing.  She states \"I have had problems with this knee for 5 years.  I think it is time.\"  She states that she understands that she will likely need to have the left leg removed in order to prevent spreading of infection to the rest of her body.  She also expresses understanding that she is a very poor surgical " "candidate, and may have a bad outcome with surgery, including possible death.  She states, \"if I go, I am ready.\"  After this, she does clarify her CODE STATUS still as full code, stating \"I have kids and grandkids that want me around. I still want you to try everything.  But if it does not work, I am okay with that.\"    No other acute changes or acute complaints at this time.  States she will discuss with her children the likely impending need for amputation.    OBJECTIVE:  Temp:  [36.8 °C (98.2 °F)-37.4 °C (99.3 °F)] 36.9 °C (98.4 °F)  Pulse:  [64-92] 66  Resp:  [16-18] 18  BP: ()/(64-90) 92/65  SpO2:  [93 %-99 %] 98 %      Intake/Output Summary (Last 24 hours) at 1/7/2024 1110  Last data filed at 1/7/2024 0840  Gross per 24 hour   Intake 360 ml   Output 800 ml   Net -440 ml         PHYSICAL EXAM:  Physical Exam  Vitals reviewed.   Constitutional:       General: She is not in acute distress.     Appearance: Normal appearance. She is normal weight. She is not toxic-appearing.   HENT:      Head: Normocephalic and atraumatic.      Right Ear: External ear normal. Decreased hearing noted.      Left Ear: External ear normal. Decreased hearing noted.      Nose: Nose normal. No congestion.      Mouth/Throat:      Mouth: Mucous membranes are moist.      Pharynx: Oropharynx is clear.   Eyes:      Extraocular Movements: Extraocular movements intact.      Conjunctiva/sclera: Conjunctivae normal.      Pupils: Pupils are equal, round, and reactive to light.   Cardiovascular:      Rate and Rhythm: Normal rate and regular rhythm.      Pulses: Normal pulses.      Heart sounds: Normal heart sounds. No murmur heard.     No friction rub. No gallop.   Pulmonary:      Effort: Pulmonary effort is normal. No respiratory distress.      Breath sounds: Normal breath sounds. No stridor. No wheezing, rhonchi or rales.   Abdominal:      General: Abdomen is flat. Bowel sounds are normal. There is no distension.      Palpations: Abdomen is " soft.      Tenderness: There is no abdominal tenderness. There is no guarding or rebound.   Musculoskeletal:         General: Swelling present.      Cervical back: Normal range of motion and neck supple.      Comments: Left knee with swelling, moderate tenderness, erythema and slight rubor, large surgical scar to bilateral anterior knees.  Noted tips of sutures poking out of incision site from left knee, no sign of redness or swelling or drainage at the actual incision site.   Skin:     General: Skin is warm and dry.      Capillary Refill: Capillary refill takes less than 2 seconds.      Coloration: Skin is not jaundiced.      Findings: No bruising.      Comments: Old skin grafts present on BLE   Neurological:      General: No focal deficit present.      Mental Status: She is alert and oriented to person, place, and time. Mental status is at baseline.      Cranial Nerves: No cranial nerve deficit.      Motor: No weakness.   Psychiatric:         Mood and Affect: Mood normal.         Behavior: Behavior normal.         Thought Content: Thought content normal.         Judgment: Judgment normal.         LABS:  Recent Labs     01/05/24  0338 01/06/24  0830 01/07/24  0142   WBC 4.4* 5.0 4.8   RBC 4.40 4.93 4.20   HEMOGLOBIN 12.1 13.5 11.5*   HEMATOCRIT 37.1 40.2 34.4*   MCV 84.3 81.5 81.9   MCH 27.5 27.4 27.4   RDW 45.8 45.6 45.9   PLATELETCT 89* 81* 73*   MPV 9.8 10.8 9.9   NEUTSPOLYS 60.70 62.50 57.70   LYMPHOCYTES 25.70 27.30 30.10   MONOCYTES 8.20 5.60 6.90   EOSINOPHILS 4.10 3.20 3.60   BASOPHILS 1.10 1.20 1.10       Recent Labs     01/05/24  0338   SODIUM 139   POTASSIUM 3.4*   CHLORIDE 113*   CO2 16*   BUN 20   CREATININE 0.74   CALCIUM 7.0*   ALBUMIN 2.0*       Estimated GFR/CRCL = Estimated Creatinine Clearance: 86.7 mL/min (by C-G formula based on SCr of 0.74 mg/dL).  Recent Labs     01/05/24  0338   GLUCOSE 118*       Recent Labs     01/05/24  0338   ASTSGOT 31   ALTSGPT 37   TBILIRUBIN 0.4   ALKPHOSPHAT 133*  "  GLOBULIN 3.6*               No results for input(s): \"INR\", \"APTT\", \"DDIMER\", \"FIBRINOGEN\" in the last 72 hours.        IMAGING:  DX-KNEE COMPLETE 4+ LEFT   Final Result         1.  No radiographic evidence of acute injury.      2. Diffuse soft tissue swelling about the knee especially in the suprapatellar region suspicious for inflammation and/or infection.      3. Previous revision of left total knee replacement.          CULTURES:   Results       Procedure Component Value Units Date/Time    FLUID CULTURE W/GRAM STAIN [[id number]]  (Abnormal) Collected: 01/02/24 1615    Order Status: Completed Specimen: Synovial Fluid Updated: 01/05/24 1106     Significant Indicator POS     Source SYNO     Site Left Knee     Culture Result -     Gram Stain Result Many WBCs.  No organisms seen.       Culture Result Corynebacterium striatum group  Rare growth      Narrative:      L knee  L knee    JOINT INFECTION PANEL [[id number]] Collected: 01/02/24 1615    Order Status: Sent Specimen: Body Fluid from Synovial Fluid Updated: 01/04/24 1305    GRAM STAIN [[id number]] Collected: 01/02/24 1615    Order Status: Completed Specimen: Synovial Updated: 01/02/24 1834     Significant Indicator .     Source SYNO     Site Left Knee     Gram Stain Result Many WBCs.  No organisms seen.      Narrative:      L knee  L knee            MEDS:  Current Facility-Administered Medications   Medication Last Admin    gabapentin (Neurontin) capsule 600 mg 600 mg at 01/07/24 0500    oxyCODONE immediate-release (Roxicodone) tablet 5 mg 5 mg at 01/07/24 0623    MD Alert...Vancomycin per Pharmacy      vancomycin (Vancocin) 750 mg in  mL IVPB Stopped at 01/07/24 0358    senna-docusate (Pericolace Or Senokot S) 8.6-50 MG per tablet 2 Tablet 2 Tablet at 01/07/24 0500    And    polyethylene glycol/lytes (Miralax) Packet 1 Packet      And    magnesium hydroxide (Milk Of Magnesia) suspension 30 mL      And    bisacodyl (Dulcolax) suppository 10 mg      labetalol " (Normodyne/Trandate) injection 10 mg      atorvastatin (Lipitor) tablet 20 mg 20 mg at 01/06/24 2116    furosemide (Lasix) tablet 20 mg 20 mg at 01/07/24 0501    metoprolol SR (Toprol XL) tablet 25 mg 25 mg at 01/04/24 2115    potassium chloride ER (Klor-Con) tablet 10 mEq 10 mEq at 01/07/24 0529    acetaminophen (Tylenol) tablet 1,000 mg 1,000 mg at 01/07/24 0622    Or    acetaminophen (Tylenol) suppository 650 mg      omeprazole (PriLOSEC) capsule 20 mg 20 mg at 01/07/24 0501    tiotropium (Spiriva Respimat) 2.5 mcg/Act inhalation spray 5 mcg 5 mcg at 01/07/24 0509    ferrous sulfate tablet 325 mg 325 mg at 01/07/24 0501    losartan (Cozaar) tablet 100 mg 100 mg at 01/07/24 0500    lactulose 20 GM/30ML solution 15 mL 15 mL at 01/06/24 0421       ASSESSMENT/PLAN:  63 y.o. female admitted for   * Septic arthritis (HCC)- (present on admission)  Assessment & Plan  Patient with history of septic arthritis in L knee with history of surgery to affected joint. S/p 6 week course of cefepime EOT 12/11. Plan was for possible aspiration of joint following course of antibiotics and consideration of further antibiotics versus surgical management. Joint aspirated in ED. Cell count with ~20,000 nucleated cells (98% polys), 100k RBC, initial gram stain with WBCs no organisms.  Patient with 0 out of 4 SIRS criteria though does have evidence of endorgan dysfunction with elevated creatinine and altered mental status.  These are most likely secondary to poor p.o. intake and underlying cirrhosis though cannot rule out early sepsis.  Antibiotics administered in ED prior to blood cultures.  -Initially started on Cefepime 2g q8h  -Switch to vancomycin 1/4, plans for adjustment if indicated by fluid culture  -1/7 spoke with orthopedics, they state they do not recommend any additional surgery for washout or revision, as this has not showed any benefit to the patient in the past and is high risk for her as a poor surgical candidate.  They  "are willing to do amputation if surgical intervention is necessary, which they believe is the only logical neck step at this time.  -1/7 spoke with ID, they state while washout is the only possible next step to avoid amputation, they do not feel the patient will improve with antibiotic therapy alone.  They state it is \"up to the patient\" when she is ready to proceed with amputation.  -As of 1/7, with consultation with Ortho and ID, I do not feel the patient will benefit from further antibiotic therapy.  Recommended neck step is amputation of the limb.  Will continue IV antibiotic therapy for now, optimizing patient is a surgical candidate while awaiting her consent for the procedure.  -Will order PTT, PT/INR, CMP, BNP, EKG, echo to evaluate patient for preoperative risk  -Following fluid cultures drawn 1/2, 5 days will be 1/7.  -As needed pain medication ordered  -No fever since admission as of 1/7    AMS (altered mental status)- (present on admission)  Assessment & Plan  Patient oriented to person and event, not place or time. Per chart review, patient noted to be A&Ox4 last month representing a change from baseline. Likely hepatic encephalopathy 2/2 septic arthritis.  Elevated ammonia to 69 is not diagnostic however it does increase suspicion for hepatic encephalopathy.  Unlikely SBP given lack of fever, abdominal pain, ascites. Covered with cefepime. Patient with mild hyponatremia on admission, otherwise no electrolyte abnormality on BMP. Blood alcohol level negative  -Home lactulose ordered on admission, titrate to 2-3 soft BM per day with concern for hepatic encephalopathy   -1/3 mental status appears dramatically improved.  Due to rapid improvement, more likely consistent with delirium, likely secondary to current infection  -1/7 patient remains A/Ox4  -Treating septic arthritis   -Delirium precautions  -Continue to monitor electrolytes and replete as indicated    Coagulopathy (HCC)- (present on " admission)  Assessment & Plan  History of alcoholic cirrhosis, INR 1.4 on admission.  -See #cirrhosis    Thrombocytopenia (HCC)- (present on admission)  Assessment & Plan  Likely secondary to alcoholic cirrhosis.  Baseline appears 70s to 100s.  Platelets ranging 70s to 110s during this admission.  - Continue to monitor for signs of active bleeding  - Consider platelet transfusion prior to surgery    Cirrhosis, alcoholic (HCC)- (present on admission)  Assessment & Plan  History of alcoholic cirrhosis. MELD-NA 1/5 is 11, <2% 3-month mortality risk.  INR 1.4 on admission.  AMS present on admission has resolved.  -Home Lasix and potassium  -Lactulose daily ordered at her home dosing.  See #altered mental status    GERD (gastroesophageal reflux disease)- (present on admission)  Assessment & Plan  - Home PPI    Hyperlipidemia- (present on admission)  Assessment & Plan  - Home statin    ELIZABETH (acute kidney injury) (HCC)- (present on admission)  Assessment & Plan  Cr elevated to 1.26 on admission from baseline that ranges from 0.6-1.0.  Elevated BUN to creatinine ratio.  Likely prerenal in the setting of altered mental status and poor p.o. intake.  Patient with some bilateral lower extremity edema, without appreciable ascites and lungs are clear to auscultation.  Patient has echocardiogram from 1 year ago with near normal results.  Will dose fluids gently in setting of known cirrhosis.  - 1 L IV fluids overnight 1/2  - 1/3 Creatinine improved 0.94  - 1/5 creatinine 0.74  - Resolved, no need for additional monitoring unless clinically changed, no concerns as of 1/7    Paroxysmal atrial fibrillation, hx of- (present on admission)  Assessment & Plan  Patient on metoprolol, not on anticoagulation likely due to underlying coagulopathy in setting of cirrhosis.  - Pt in afib since admission, no RVR.  - Tele monitoring  - Home metoprolol    Hypertension- (present on admission)  Assessment & Plan  Chronic, SBP ranging 120-145 since  admission.  - Home losartan  - As needed antihypertensives ordered    Controlled type 2 diabetes mellitus with hyperglycemia, without long-term current use of insulin ([facility])- (present on admission)  Assessment & Plan  Patient with a history of type 2 diabetes.  Most recent A1c in the normal range at 5.4 in January 2023.  Not on outpatient medication.  Patient currently altered, pending SLP eval.  - Continue to monitor blood glucose on BMP, will consider initiating sliding scale insulin if sugars are elevated  - 1/3 AM glucose 104  - 1/5 AM glucose 118  - No need for additional monitoring unless signs of hypo/hyperglycemia present         Core Measures:  Fluids: IV push only, no IV fluids  Lines: Peripheral IV for intravenous access  Abx: Vancomycin  Diet: regular diet  PPX: SCDs/TEDs and pharmacologic prophylaxis contraindicated due to hx of cirrhosis, coagulopathy    CODE Status: Full Code      Disposition  Patient is medically cleared pending finalized synovial fluid cultures 1/7, possible plan for amputation of the left lower extremity  for discharge.   Anticipate discharge to skilled nursing facility.  I have placed the appropriate orders for post-discharge needs.    I have personally seen and examined the patient at bedside. I discussed the plan of care with patient, bedside RN, , and  Ruby Birmingham M.d..      Rod Dunn M.D.   PGY-1  [facility]

## 2024-01-07 NOTE — PROGRESS NOTES
"Infectious Disease Progress Note    Author: Griselda Vazquez M.D. Date & Time of service: 2024  12:00 PM    Chief Complaint:  Follow-up for left knee spacer infection    Interval History:    patient remains afebrile, count is 4.4, some improvement in the pain, tolerating vancomycin, cultures as below, creatinine 0.74   AF denies SE abx-c/o \"staples\" in surgical site left knee-also states plan is for PICC and 6 weeks IV abx   AF no new complaints    Labs Reviewed and Medications Reviewed.    Review of Systems:  Review of Systems   Constitutional:  Negative for fever.   Musculoskeletal:  Positive for joint pain.   All other systems reviewed and are negative.      Hemodynamics:  Temp (24hrs), Av.1 °C (98.7 °F), Min:36.8 °C (98.2 °F), Max:37.4 °C (99.3 °F)  Temperature: 36.9 °C (98.4 °F)  Pulse  Av.9  Min: 55  Max: 99   Blood Pressure: 92/65       Physical Exam:  Physical Exam  Vitals and nursing note reviewed.   Constitutional:       General: She is not in acute distress.     Appearance: She is not ill-appearing, toxic-appearing or diaphoretic.   HENT:      Mouth/Throat:      Pharynx: No oropharyngeal exudate.      Comments: edentulous  Eyes:      General: No scleral icterus.  Cardiovascular:      Rate and Rhythm: Normal rate.   Pulmonary:      Effort: Pulmonary effort is normal. No respiratory distress.   Abdominal:      General: There is no distension.   Musculoskeletal:         General: Swelling and deformity present.      Comments: L knee with swelling-mild warmth  Query palpable sutures along surgical site   Skin:     Coloration: Skin is not jaundiced.      Findings: Bruising present.         Meds:    Current Facility-Administered Medications:     gabapentin    oxyCODONE immediate-release    MD Alert...Vancomycin per Pharmacy    vancomycin    senna-docusate **AND** polyethylene glycol/lytes **AND** magnesium hydroxide **AND** bisacodyl    labetalol    atorvastatin    furosemide    " metoprolol SR    potassium chloride ER    acetaminophen **OR** acetaminophen    omeprazole    tiotropium    ferrous sulfate    losartan    lactulose    Labs:  Recent Labs     01/05/24  0338 01/06/24  0830 01/07/24  0142   WBC 4.4* 5.0 4.8   RBC 4.40 4.93 4.20   HEMOGLOBIN 12.1 13.5 11.5*   HEMATOCRIT 37.1 40.2 34.4*   MCV 84.3 81.5 81.9   MCH 27.5 27.4 27.4   RDW 45.8 45.6 45.9   PLATELETCT 89* 81* 73*   MPV 9.8 10.8 9.9   NEUTSPOLYS 60.70 62.50 57.70   LYMPHOCYTES 25.70 27.30 30.10   MONOCYTES 8.20 5.60 6.90   EOSINOPHILS 4.10 3.20 3.60   BASOPHILS 1.10 1.20 1.10       Recent Labs     01/05/24  0338   SODIUM 139   POTASSIUM 3.4*   CHLORIDE 113*   CO2 16*   GLUCOSE 118*   BUN 20       Recent Labs     01/05/24  0338   ALBUMIN 2.0*   TBILIRUBIN 0.4   ALKPHOSPHAT 133*   TOTPROTEIN 5.6*   ALTSGPT 37   ASTSGOT 31   CREATININE 0.74         Imaging:  DX-KNEE COMPLETE 4+ LEFT    Result Date: 1/2/2024 1/2/2024 3:43 PM HISTORY/REASON FOR EXAM:  Atraumatic Pain/Swelling/Deformity; history of recurrent joint infections TECHNIQUE/EXAM DESCRIPTION AND NUMBER OF VIEWS: 4 of the left knee COMPARISON: Left knee series 10/30/2023 FINDINGS: There has been previous revision of left total knee replacement with removal of the tibial component and placement of a short sneha extending from the distal femur into the proximal tibia. There is diffuse soft tissue swelling about the knee especially superior to the patella     1.  No radiographic evidence of acute injury. 2. Diffuse soft tissue swelling about the knee especially in the suprapatellar region suspicious for inflammation and/or infection. 3. Previous revision of left total knee replacement.      Micro:  Results       Procedure Component Value Units Date/Time    FLUID CULTURE W/GRAM STAIN [[id number]]  (Abnormal) Collected: 01/02/24 1618    Order Status: Completed Specimen: Synovial Fluid Updated: 01/05/24 1106     Significant Indicator POS     Source SYNO     Site Left Knee      Culture Result -     Gram Stain Result Many WBCs.  No organisms seen.       Culture Result Corynebacterium striatum group  Rare growth      Narrative:      L knee  L knee    JOINT INFECTION PANEL [[id number]] Collected: 01/02/24 1615    Order Status: Sent Specimen: Body Fluid from Synovial Fluid Updated: 01/04/24 1305    GRAM STAIN [[id number]] Collected: 01/02/24 1615    Order Status: Completed Specimen: Synovial Updated: 01/02/24 1834     Significant Indicator .     Source SYNO     Site Left Knee     Gram Stain Result Many WBCs.  No organisms seen.      Narrative:      L knee  L knee            Assessment:  April Alicia is a 63 y.o. female patient with history of alcohol use and remote history of cocaine use, asthma, multiple left knee prosthetic joint infections, first in July 2022 with left knee prosthetic joint septic arthritis and left native shoulder joint septic arthritis with group B strep status post I&D, incompletely treated and did not follow-up, had recurrent left knee prosthetic joint infection in December 2022, underwent explantation and placement of articulating spacer, also group B strep.  Given failure of multiple procedures, there is no immediate plan for second stage reimplantation given high risk for failure.  Patient received 6 weeks of IV ceftriaxone till 2/7/2023 and remained on chronic Augmentin with plan to continue at least through December 2023.  In October 2023, patient states that a drunk person in the park fell on her left knee and developed swelling and pain, fluid analysis consistent with infection, PCR and cultures were positive for Pseudomonas.  There was also late growth of a methicillin susceptible coagulase-negative Staphylococcus as well as a VRE faecium thought to be contaminants.  Her knee improved significantly with cefepime and she completed antibiotics on 12/11/2023.     Patient is now readmitted once again with left knee infection.  Cultures growing  Corynebacterium stratum thus far.     Pertinent Diagnoses:  Left knee joint infection, Corynebacterium  History of multiple left knee prosthetic joint infections  Recent history of Pseudomonas left knee spacer infection  Prior history of group B strep left knee prosthetic joint infection and left shoulder native joint septic arthritis  Controlled type 2 diabetes  Cirrhosis  History of alcohol use  Remote history of cocaine use    Plan:  -Cultures growing Corynebacterium striatum thus far.  Spoke to microbiology lab and they will send for susceptibilities.   -Continue IV vancomycin for now  -Orthopedics consulted, noted she has missed approximately 7 follow-up appointments with them and is a poor candidate with high likelihood for failure if yet another attempt of spacer exchange is made.  They are currently recommending suppressive antibiotics.    If IV antibiotics alone are not successful, she may still need a washout in order to be able to get to the point of suppressive antibiotics (if oral options for the corynebacterium are available).  Other options include above-the-knee amputation or a more palliative approach    -Please have Ortho eval knee-feels like nylon or other synthetic sutures along incision  Disposition: Unable to determine at this time  Need for PICC line: Yes if plan for IV abx-awaiting sensi Corynebacterium to see if oral option available

## 2024-01-07 NOTE — PROGRESS NOTES
Received report from night shift. Patient sitting in chair with chair alarm on and call light within reach.

## 2024-01-07 NOTE — CARE PLAN
The patient is Stable - Low risk of patient condition declining or worsening    Shift Goals  Clinical Goals: increase moblity, pain control, treat infection  Patient Goals: pain control, mobilize  Family Goals: na    Patient mobilized with walker and sat in chair. Pain managed, ice applied on L knee. Remain on IV abx. POC ongoing.     Progress made toward(s) clinical / shift goals:    Problem: Pain - Standard  Goal: Alleviation of pain or a reduction in pain to the patient’s comfort goal  Outcome: Progressing     Problem: Knowledge Deficit - Standard  Goal: Patient and family/care givers will demonstrate understanding of plan of care, disease process/condition, diagnostic tests and medications  Outcome: Progressing     Problem: Hemodynamics  Goal: Patient's hemodynamics, fluid balance and neurologic status will be stable or improve  Outcome: Progressing     Problem: Fluid Volume  Goal: Fluid volume balance will be maintained  Outcome: Progressing     Problem: Urinary - Renal Perfusion  Goal: Ability to achieve and maintain adequate renal perfusion and functioning will improve  Outcome: Progressing     Problem: Respiratory  Goal: Patient will achieve/maintain optimum respiratory ventilation and gas exchange  Outcome: Progressing     Problem: Mechanical Ventilation  Goal: Safe management of artificial airway and ventilation  Outcome: Progressing  Goal: Successful weaning off mechanical ventilator, spontaneously maintains adequate gas exchange  Outcome: Progressing  Goal: Patient will be able to express needs and understand communication  Outcome: Progressing     Problem: Physical Regulation  Goal: Diagnostic test results will improve  Outcome: Progressing  Goal: Signs and symptoms of infection will decrease  Outcome: Progressing     Problem: Skin Integrity  Goal: Skin integrity is maintained or improved  Outcome: Progressing     Problem: Fall Risk  Goal: Patient will remain free from falls  Outcome: Progressing        Patient is not progressing towards the following goals:

## 2024-01-08 PROBLEM — E83.51 HYPOCALCEMIA: Status: ACTIVE | Noted: 2024-01-08

## 2024-01-08 PROBLEM — Z01.810 PRE-OPERATIVE CARDIOVASCULAR EXAMINATION, HIGH RISK SURGERY: Status: ACTIVE | Noted: 2024-01-08

## 2024-01-08 LAB
ABO GROUP BLD: NORMAL
ALBUMIN SERPL BCP-MCNC: 2.2 G/DL (ref 3.2–4.9)
ALBUMIN/GLOB SERPL: 0.6 G/DL
ALP SERPL-CCNC: 194 U/L (ref 30–99)
ALT SERPL-CCNC: 27 U/L (ref 2–50)
ANION GAP SERPL CALC-SCNC: 11 MMOL/L (ref 7–16)
AST SERPL-CCNC: 25 U/L (ref 12–45)
BASOPHILS # BLD AUTO: 1.1 % (ref 0–1.8)
BASOPHILS # BLD: 0.05 K/UL (ref 0–0.12)
BILIRUB SERPL-MCNC: 0.4 MG/DL (ref 0.1–1.5)
BLD GP AB SCN SERPL QL: NORMAL
BUN SERPL-MCNC: 28 MG/DL (ref 8–22)
CA-I SERPL-SCNC: 1.1 MMOL/L (ref 1.1–1.3)
CALCIUM ALBUM COR SERPL-MCNC: 8.4 MG/DL (ref 8.5–10.5)
CALCIUM SERPL-MCNC: 7 MG/DL (ref 8.5–10.5)
CHLORIDE SERPL-SCNC: 113 MMOL/L (ref 96–112)
CK SERPL-CCNC: 70 U/L (ref 0–154)
CO2 SERPL-SCNC: 16 MMOL/L (ref 20–33)
CREAT SERPL-MCNC: 0.9 MG/DL (ref 0.5–1.4)
EOSINOPHIL # BLD AUTO: 0.15 K/UL (ref 0–0.51)
EOSINOPHIL NFR BLD: 3.2 % (ref 0–6.9)
ERYTHROCYTE [DISTWIDTH] IN BLOOD BY AUTOMATED COUNT: 47.6 FL (ref 35.9–50)
GFR SERPLBLD CREATININE-BSD FMLA CKD-EPI: 71 ML/MIN/1.73 M 2
GLOBULIN SER CALC-MCNC: 3.4 G/DL (ref 1.9–3.5)
GLUCOSE SERPL-MCNC: 119 MG/DL (ref 65–99)
HCT VFR BLD AUTO: 33.4 % (ref 37–47)
HGB BLD-MCNC: 10.9 G/DL (ref 12–16)
IMM GRANULOCYTES # BLD AUTO: 0.04 K/UL (ref 0–0.11)
IMM GRANULOCYTES NFR BLD AUTO: 0.9 % (ref 0–0.9)
LYMPHOCYTES # BLD AUTO: 1.36 K/UL (ref 1–4.8)
LYMPHOCYTES NFR BLD: 29.4 % (ref 22–41)
MCH RBC QN AUTO: 26.8 PG (ref 27–33)
MCHC RBC AUTO-ENTMCNC: 32.6 G/DL (ref 32.2–35.5)
MCV RBC AUTO: 82.3 FL (ref 81.4–97.8)
MONOCYTES # BLD AUTO: 0.31 K/UL (ref 0–0.85)
MONOCYTES NFR BLD AUTO: 6.7 % (ref 0–13.4)
NEUTROPHILS # BLD AUTO: 2.71 K/UL (ref 1.82–7.42)
NEUTROPHILS NFR BLD: 58.7 % (ref 44–72)
NRBC # BLD AUTO: 0 K/UL
NRBC BLD-RTO: 0 /100 WBC (ref 0–0.2)
PLATELET # BLD AUTO: 68 K/UL (ref 164–446)
PLATELETS.RETICULATED NFR BLD AUTO: 1.9 % (ref 0.6–13.1)
POTASSIUM SERPL-SCNC: 3.9 MMOL/L (ref 3.6–5.5)
PROT SERPL-MCNC: 5.6 G/DL (ref 6–8.2)
RBC # BLD AUTO: 4.06 M/UL (ref 4.2–5.4)
RH BLD: NORMAL
SODIUM SERPL-SCNC: 140 MMOL/L (ref 135–145)
WBC # BLD AUTO: 4.6 K/UL (ref 4.8–10.8)

## 2024-01-08 PROCEDURE — 86850 RBC ANTIBODY SCREEN: CPT

## 2024-01-08 PROCEDURE — 82550 ASSAY OF CK (CPK): CPT

## 2024-01-08 PROCEDURE — 99232 SBSQ HOSP IP/OBS MODERATE 35: CPT | Mod: GC | Performed by: FAMILY MEDICINE

## 2024-01-08 PROCEDURE — 36415 COLL VENOUS BLD VENIPUNCTURE: CPT

## 2024-01-08 PROCEDURE — 85055 RETICULATED PLATELET ASSAY: CPT

## 2024-01-08 PROCEDURE — 700105 HCHG RX REV CODE 258: Performed by: INTERNAL MEDICINE

## 2024-01-08 PROCEDURE — 97535 SELF CARE MNGMENT TRAINING: CPT

## 2024-01-08 PROCEDURE — 85025 COMPLETE CBC W/AUTO DIFF WBC: CPT

## 2024-01-08 PROCEDURE — 700111 HCHG RX REV CODE 636 W/ 250 OVERRIDE (IP): Performed by: INTERNAL MEDICINE

## 2024-01-08 PROCEDURE — 86900 BLOOD TYPING SEROLOGIC ABO: CPT

## 2024-01-08 PROCEDURE — 700102 HCHG RX REV CODE 250 W/ 637 OVERRIDE(OP): Performed by: FAMILY MEDICINE

## 2024-01-08 PROCEDURE — 700102 HCHG RX REV CODE 250 W/ 637 OVERRIDE(OP)

## 2024-01-08 PROCEDURE — 99233 SBSQ HOSP IP/OBS HIGH 50: CPT | Performed by: INTERNAL MEDICINE

## 2024-01-08 PROCEDURE — 770020 HCHG ROOM/CARE - TELE (206)

## 2024-01-08 PROCEDURE — A9270 NON-COVERED ITEM OR SERVICE: HCPCS | Performed by: FAMILY MEDICINE

## 2024-01-08 PROCEDURE — A9270 NON-COVERED ITEM OR SERVICE: HCPCS

## 2024-01-08 PROCEDURE — 86901 BLOOD TYPING SEROLOGIC RH(D): CPT

## 2024-01-08 PROCEDURE — 82330 ASSAY OF CALCIUM: CPT

## 2024-01-08 PROCEDURE — 80053 COMPREHEN METABOLIC PANEL: CPT

## 2024-01-08 PROCEDURE — 700111 HCHG RX REV CODE 636 W/ 250 OVERRIDE (IP): Mod: JZ | Performed by: INTERNAL MEDICINE

## 2024-01-08 RX ORDER — CALCIUM CARBONATE 500 MG/1
500 TABLET, CHEWABLE ORAL DAILY
Status: DISCONTINUED | OUTPATIENT
Start: 2024-01-08 | End: 2024-01-09 | Stop reason: HOSPADM

## 2024-01-08 RX ADMIN — TIOTROPIUM BROMIDE INHALATION SPRAY 5 MCG: 3.12 SPRAY, METERED RESPIRATORY (INHALATION) at 04:26

## 2024-01-08 RX ADMIN — LACTULOSE 15 ML: 20 SOLUTION ORAL at 04:19

## 2024-01-08 RX ADMIN — DAPTOMYCIN 700 MG: 500 INJECTION, POWDER, LYOPHILIZED, FOR SOLUTION INTRAVENOUS at 16:10

## 2024-01-08 RX ADMIN — LOSARTAN POTASSIUM 100 MG: 50 TABLET, FILM COATED ORAL at 04:24

## 2024-01-08 RX ADMIN — DOCUSATE SODIUM 50 MG AND SENNOSIDES 8.6 MG 2 TABLET: 8.6; 5 TABLET, FILM COATED ORAL at 04:22

## 2024-01-08 RX ADMIN — ACETAMINOPHEN 1000 MG: 500 TABLET ORAL at 21:03

## 2024-01-08 RX ADMIN — OXYCODONE HYDROCHLORIDE 5 MG: 5 TABLET ORAL at 21:03

## 2024-01-08 RX ADMIN — GABAPENTIN 600 MG: 300 CAPSULE ORAL at 20:41

## 2024-01-08 RX ADMIN — POTASSIUM CHLORIDE 10 MEQ: 750 TABLET, EXTENDED RELEASE ORAL at 04:21

## 2024-01-08 RX ADMIN — ATORVASTATIN CALCIUM 20 MG: 20 TABLET, FILM COATED ORAL at 20:42

## 2024-01-08 RX ADMIN — METOPROLOL SUCCINATE 25 MG: 25 TABLET, EXTENDED RELEASE ORAL at 20:41

## 2024-01-08 RX ADMIN — ACETAMINOPHEN 1000 MG: 500 TABLET ORAL at 11:24

## 2024-01-08 RX ADMIN — POTASSIUM CHLORIDE 40 MEQ: 1500 TABLET, EXTENDED RELEASE ORAL at 04:20

## 2024-01-08 RX ADMIN — OMEPRAZOLE 20 MG: 20 CAPSULE, DELAYED RELEASE ORAL at 04:22

## 2024-01-08 RX ADMIN — GABAPENTIN 600 MG: 300 CAPSULE ORAL at 04:22

## 2024-01-08 RX ADMIN — FERROUS SULFATE TAB 325 MG (65 MG ELEMENTAL FE) 325 MG: 325 (65 FE) TAB at 04:22

## 2024-01-08 RX ADMIN — DOCUSATE SODIUM 50 MG AND SENNOSIDES 8.6 MG 2 TABLET: 8.6; 5 TABLET, FILM COATED ORAL at 18:00

## 2024-01-08 RX ADMIN — OXYCODONE HYDROCHLORIDE 5 MG: 5 TABLET ORAL at 11:24

## 2024-01-08 RX ADMIN — OXYCODONE HYDROCHLORIDE 5 MG: 5 TABLET ORAL at 01:24

## 2024-01-08 RX ADMIN — ACETAMINOPHEN 1000 MG: 500 TABLET ORAL at 01:24

## 2024-01-08 RX ADMIN — VANCOMYCIN HYDROCHLORIDE 750 MG: 5 INJECTION, POWDER, LYOPHILIZED, FOR SOLUTION INTRAVENOUS at 01:21

## 2024-01-08 RX ADMIN — GABAPENTIN 600 MG: 300 CAPSULE ORAL at 14:56

## 2024-01-08 RX ADMIN — ANTACID TABLETS 500 MG: 500 TABLET, CHEWABLE ORAL at 09:27

## 2024-01-08 ASSESSMENT — COGNITIVE AND FUNCTIONAL STATUS - GENERAL
HELP NEEDED FOR BATHING: A LITTLE
PERSONAL GROOMING: A LITTLE
TOILETING: A LITTLE
DRESSING REGULAR UPPER BODY CLOTHING: A LITTLE
DAILY ACTIVITIY SCORE: 19
DRESSING REGULAR LOWER BODY CLOTHING: A LITTLE
SUGGESTED CMS G CODE MODIFIER DAILY ACTIVITY: CK

## 2024-01-08 ASSESSMENT — ENCOUNTER SYMPTOMS
SHORTNESS OF BREATH: 0
COUGH: 0
FEVER: 0

## 2024-01-08 ASSESSMENT — PAIN DESCRIPTION - PAIN TYPE
TYPE: ACUTE PAIN

## 2024-01-08 ASSESSMENT — FIBROSIS 4 INDEX: FIB4 SCORE: 4.46

## 2024-01-08 NOTE — ASSESSMENT & PLAN NOTE
History of alcoholic cirrhosis, INR 1.4 on admission.  Remains elevated 1.41 on repeat testing 1/7  -See #cirrhosis

## 2024-01-08 NOTE — ASSESSMENT & PLAN NOTE
Noted on preoperative CMP 1/7.  - Status post repletion with calcium gluconate 3 g 1/7  - 1/8 corrected calcium remains low at 8.4, improved from 7.9 on 1/7.  - Ionized calcium normal, 1.1 mmol/L  - CT, will add oral calcium carbonate

## 2024-01-08 NOTE — ASSESSMENT & PLAN NOTE
Preparing patient for amputation of the left lower extremity, high risk due to history of cirrhosis, prior coding during washout procedure in late 2023.  - 1/7 PTT elevated at 39.4, INR 1.41  - 1/7 EKG shows A-fib, rate controlled  - 1/7 echo shows LVEF 53%, severe dilation of left atrium  - 1/7 BNP 1656  - 1/7 CMP shows normal kidney function, elevated alk phos, low albumin, hypo-K, hypo-Ca  - Patient with chronic thrombocytopenia, most recent result 68 on 1/8.  Likely due to alcoholic cirrhosis.  Consider platelet transfusion, +/- FFP on date of surgery  - Will order type and screen to prepare patient for possible transfusion during surgery

## 2024-01-08 NOTE — CARE PLAN
Problem: Pain - Standard  Goal: Alleviation of pain or a reduction in pain to the patient’s comfort goal  Outcome: Progressing   Patient medicated per the MAR and able to participate in daily activity.     Problem: Knowledge Deficit - Standard  Goal: Patient and family/care givers will demonstrate understanding of plan of care, disease process/condition, diagnostic tests and medications  Outcome: Progressing   Pt and family educated on POC, all questions answered in regards to disease process, treatment and diet. Pt and family verbalize understanding and voice no further questions at this time.    Problem: Fall Risk  Goal: Patient will remain free from falls  Outcome: Progressing  Patient ambulated to the bathroom with FWW. Patient free from falls and bed alarm on.      The patient is Stable - Low risk of patient condition declining or worsening    Shift Goals  Clinical Goals: pain management, safety, education  Patient Goals: comfort  Family Goals: na

## 2024-01-08 NOTE — ASSESSMENT & PLAN NOTE
Likely secondary to alcoholic cirrhosis.  Baseline appears 70s to 100s.  Platelets ranging 70s to 110s during this admission.  - Continue to monitor for signs of active bleeding  - Consider platelet transfusion prior to surgery

## 2024-01-08 NOTE — ASSESSMENT & PLAN NOTE
Noted on preoperative CMP 1/7.  - Status post repletion with additional 40 mEq 1/7  - Normalized on 1/8  - Continue daily 10 mEq repletion

## 2024-01-08 NOTE — PROGRESS NOTES
"    FAMILY MEDICINE PROGRESS NOTE          PATIENT ID:  NAME:  April Alicia  MRN:               [id number]  YOB: 1960    Date of Admission: 1/2/2024     Attending: Mark Hernadez M.d.     Resident: Rod Dunn M.D. (PGY-1)    Primary Care Physician:  Anum Gatica M.D.    HPI: April Alicia is a 63 y.o. female with a past medical history of alcoholic cirrhosis, multiple orthopedic surgeries, diet controlled type 2 diabetes, rate controlled atrial fibrillation, history of septic arthritis of left knee after surgical revision admitted for septic arthritis of left knee status post 6 weeks of Cefepime on hospital day 6    Interval Problem Update  1/4: formal orthopedic consult placed, infectious disease consult placed with recommendation to change antibiotic regimen to vancomycin at this time but if PCR returns positive for Pseudomonas to restart cefepime and also with instruction to contact infectious disease team if VRE shows up on cultures.   1/5: Discussed plan of care with patient,  Agrees with plan to continue antibiotics for now, agrees with plan for future amputation if necessary.  Reaffirms CODE STATUS as full code.    1/6: Discussion with Dr. Luz, Orthopedics. Recommends amputation if abx not showing improvement, does not recommend any more revisions as she states they are high risk for each operation and does not seem to be providing any benefit. Pt agreeable to amputation \"if needed,\" but still wants some time to decide when she is ready.   1/7: Spoke with Ortho, ID regarding timeline for amputation.  Ortho states the patient would be appropriate for amputation whenever she is ready.  ID states that if no washout is possible, then amputation is appropriate whenever the patient is ready.    SUBJECTIVE:   No acute events overnight, pt states she feels about unchanged. Discussed pt's status with her and her daughter, Ava, via phone today. They state they understand the " patient's current status and agree that amputation will likely be necessary.  However, they feel better about waiting a few days to let the patient get her affairs in order, including seeing her children who was comfortable out of town in order to see her.  Due to her high risk for surgery, they are concerned that she might not make it through the surgery and would like to see her before the surgery.    OBJECTIVE:  Temp:  [36.6 °C (97.9 °F)-37.4 °C (99.3 °F)] 37.4 °C (99.3 °F)  Pulse:  [66-97] 97  Resp:  [15-19] 18  BP: ()/(63-72) 102/63  SpO2:  [92 %-100 %] 97 %      Intake/Output Summary (Last 24 hours) at 1/8/2024 0714  Last data filed at 1/8/2024 0638  Gross per 24 hour   Intake 1240 ml   Output 750 ml   Net 490 ml         PHYSICAL EXAM:  Physical Exam  Vitals reviewed.   Constitutional:       General: She is not in acute distress.     Appearance: Normal appearance. She is normal weight. She is not toxic-appearing.   HENT:      Head: Normocephalic and atraumatic.      Right Ear: External ear normal. Decreased hearing noted.      Left Ear: External ear normal. Decreased hearing noted.      Nose: Nose normal. No congestion.      Mouth/Throat:      Mouth: Mucous membranes are moist.      Pharynx: Oropharynx is clear.   Eyes:      Extraocular Movements: Extraocular movements intact.      Conjunctiva/sclera: Conjunctivae normal.      Pupils: Pupils are equal, round, and reactive to light.   Cardiovascular:      Rate and Rhythm: Normal rate and regular rhythm.      Pulses: Normal pulses.      Heart sounds: Normal heart sounds. No murmur heard.     No friction rub. No gallop.   Pulmonary:      Effort: Pulmonary effort is normal. No respiratory distress.      Breath sounds: Normal breath sounds. No stridor. No wheezing, rhonchi or rales.   Abdominal:      General: Abdomen is flat. Bowel sounds are normal. There is no distension.      Palpations: Abdomen is soft.      Tenderness: There is no abdominal tenderness.  There is no guarding or rebound.   Musculoskeletal:         General: Swelling present.      Cervical back: Normal range of motion and neck supple.      Comments: Left knee with swelling, moderate tenderness, erythema and slight rubor, large surgical scar to bilateral anterior knees.  Noted tips of sutures poking out of incision site from left knee, no sign of redness or swelling or drainage at the actual incision site.  Unchanged from prior exam.   Skin:     General: Skin is warm and dry.      Capillary Refill: Capillary refill takes less than 2 seconds.      Coloration: Skin is not jaundiced.      Findings: No bruising.      Comments: Old skin grafts present on BLE   Neurological:      General: No focal deficit present.      Mental Status: She is alert and oriented to person, place, and time. Mental status is at baseline.      Cranial Nerves: No cranial nerve deficit.      Motor: No weakness.   Psychiatric:         Mood and Affect: Mood normal.         Behavior: Behavior normal.         Thought Content: Thought content normal.         Judgment: Judgment normal.         LABS:  Recent Labs     01/06/24  0830 01/07/24  0142 01/08/24  0159   WBC 5.0 4.8 4.6*   RBC 4.93 4.20 4.06*   HEMOGLOBIN 13.5 11.5* 10.9*   HEMATOCRIT 40.2 34.4* 33.4*   MCV 81.5 81.9 82.3   MCH 27.4 27.4 26.8*   RDW 45.6 45.9 47.6   PLATELETCT 81* 73* 68*   MPV 10.8 9.9  --    NEUTSPOLYS 62.50 57.70 58.70   LYMPHOCYTES 27.30 30.10 29.40   MONOCYTES 5.60 6.90 6.70   EOSINOPHILS 3.20 3.60 3.20   BASOPHILS 1.20 1.10 1.10       Recent Labs     01/07/24  1443 01/08/24  0159   SODIUM 136 140   POTASSIUM 3.4* 3.9   CHLORIDE 109 113*   CO2 16* 16*   BUN 26* 28*   CREATININE 0.89 0.90   CALCIUM 6.6* 7.0*   ALBUMIN 2.4* 2.2*       Estimated GFR/CRCL = Estimated Creatinine Clearance: 71.6 mL/min (by C-G formula based on SCr of 0.9 mg/dL).  Recent Labs     01/07/24  1443 01/08/24  0159   GLUCOSE 174* 119*       Recent Labs     01/07/24  1443 01/08/24  0159    ASTSGOT 27 25   ALTSGPT 31 27   TBILIRUBIN 0.4 0.4   ALKPHOSPHAT 206* 194*   GLOBULIN 3.9* 3.4   INR 1.41*  --                Recent Labs     01/07/24  1443   INR 1.41*   APTT 39.4*           IMAGING:  EC-ECHOCARDIOGRAM COMPLETE W/O CONT   Final Result      DX-KNEE COMPLETE 4+ LEFT   Final Result         1.  No radiographic evidence of acute injury.      2. Diffuse soft tissue swelling about the knee especially in the suprapatellar region suspicious for inflammation and/or infection.      3. Previous revision of left total knee replacement.          CULTURES:   Results       Procedure Component Value Units Date/Time    FLUID CULTURE W/GRAM STAIN [[id number]]  (Abnormal) Collected: 01/02/24 1615    Order Status: Completed Specimen: Synovial Fluid Updated: 01/05/24 1106     Significant Indicator POS     Source SYNO     Site Left Knee     Culture Result -     Gram Stain Result Many WBCs.  No organisms seen.       Culture Result Corynebacterium striatum group  Rare growth      Narrative:      L knee  L knee    JOINT INFECTION PANEL [[id number]] Collected: 01/02/24 1615    Order Status: Sent Specimen: Body Fluid from Synovial Fluid Updated: 01/04/24 1305    GRAM STAIN [[id number]] Collected: 01/02/24 1615    Order Status: Completed Specimen: Synovial Updated: 01/02/24 1834     Significant Indicator .     Source SYNO     Site Left Knee     Gram Stain Result Many WBCs.  No organisms seen.      Narrative:      L knee  L knee            MEDS:  Current Facility-Administered Medications   Medication Last Admin    potassium chloride SA (Kdur) tablet 40 mEq 40 mEq at 01/08/24 0420    gabapentin (Neurontin) capsule 600 mg 600 mg at 01/08/24 0422    oxyCODONE immediate-release (Roxicodone) tablet 5 mg 5 mg at 01/08/24 0124    MD Alert...Vancomycin per Pharmacy      vancomycin (Vancocin) 750 mg in  mL IVPB Stopped at 01/08/24 0321    senna-docusate (Pericolace Or Senokot S) 8.6-50 MG per tablet 2 Tablet 2 Tablet at 01/08/24  0422    And    polyethylene glycol/lytes (Miralax) Packet 1 Packet      And    magnesium hydroxide (Milk Of Magnesia) suspension 30 mL      And    bisacodyl (Dulcolax) suppository 10 mg      labetalol (Normodyne/Trandate) injection 10 mg      atorvastatin (Lipitor) tablet 20 mg 20 mg at 01/07/24 2045    furosemide (Lasix) tablet 20 mg 20 mg at 01/07/24 0501    metoprolol SR (Toprol XL) tablet 25 mg 25 mg at 01/04/24 2115    potassium chloride ER (Klor-Con) tablet 10 mEq 10 mEq at 01/08/24 0421    acetaminophen (Tylenol) tablet 1,000 mg 1,000 mg at 01/08/24 0124    Or    acetaminophen (Tylenol) suppository 650 mg      omeprazole (PriLOSEC) capsule 20 mg 20 mg at 01/08/24 0422    tiotropium (Spiriva Respimat) 2.5 mcg/Act inhalation spray 5 mcg 5 mcg at 01/08/24 0426    ferrous sulfate tablet 325 mg 325 mg at 01/08/24 0422    losartan (Cozaar) tablet 100 mg 100 mg at 01/08/24 0424    lactulose 20 GM/30ML solution 15 mL 15 mL at 01/08/24 0419       ASSESSMENT/PLAN:  63 y.o. female admitted for   * Septic arthritis (HCC)- (present on admission)  Assessment & Plan  Patient with history of septic arthritis in L knee with history of surgery to affected joint. S/p 6 week course of cefepime EOT 12/11. Plan was for possible aspiration of joint following course of antibiotics and consideration of further antibiotics versus surgical management. Joint aspirated in ED. Cell count with ~20,000 nucleated cells (98% polys), 100k RBC, initial gram stain with WBCs no organisms.  Patient with 0 out of 4 SIRS criteria though does have evidence of endorgan dysfunction with elevated creatinine and altered mental status.  These are most likely secondary to poor p.o. intake and underlying cirrhosis though cannot rule out early sepsis.  Antibiotics administered in ED prior to blood cultures.  -Initially started on Cefepime 2g q8h  -Switch to vancomycin 1/4, plans for adjustment if indicated by fluid culture  -1/7 spoke with orthopedics, they  "state they do not recommend any additional surgery for washout or revision, as this has not showed any benefit to the patient in the past and is high risk for her as a poor surgical candidate.  They are willing to do amputation if surgical intervention is necessary, which they believe is the only logical neck step at this time.  -1/7 spoke with ID, they state while washout is the only possible next step to avoid amputation, they do not feel the patient will improve with antibiotic therapy alone.  They state it is \"up to the patient\" when she is ready to proceed with amputation.  -As of 1/7, with consultation with Ortho and ID, I do not feel the patient will benefit from further antibiotic therapy.  Recommended next step is amputation of the limb.  Will continue IV antibiotic therapy for now, optimizing patient as a surgical candidate while awaiting her consent for the procedure.  -Discussion with family and patient 1/8, would like to wait for patient's family to get him down to see her before surgery as they understand she is high risk and may possibly do the procedure.  In discussion with pharmacy, must wait for final sensitivities of fluid cultures to come back before transitioning from IV antibiotics.  -See #preoperative cardiovascular examination   -Following fluid cultures drawn 1/2, 5 days will be 1/7.  Corynebacterium striatum shows local susceptibility to oral Augmentin, oral linezolid for severe infections, appropriate if patient chooses to decline amputation  -As needed pain medication ordered  -No fever since admission as of 1/8    Pre-operative cardiovascular examination, high risk surgery  Assessment & Plan  Preparing patient for amputation of the left lower extremity, high risk due to history of cirrhosis, prior coding during washout procedure in late 2023.  - 1/7 PTT elevated at 39.4, INR 1.41  - 1/7 EKG shows A-fib, rate controlled  - 1/7 echo shows LVEF 53%, severe dilation of left atrium  - 1/7 BNP " 1656  - 1/7 CMP shows normal kidney function, elevated alk phos, low albumin, hypo-K, hypo-Ca  - Patient with chronic thrombocytopenia, most recent result 68 on 1/8.  Likely due to alcoholic cirrhosis.  Consider platelet transfusion, +/- FFP on date of surgery  - Will order type and screen to prepare patient for possible transfusion during surgery    Hypocalcemia  Assessment & Plan  Noted on preoperative CMP 1/7.  - Status post repletion with calcium gluconate 3 g 1/7  - 1/8 corrected calcium remains low at 8.4, improved from 7.9 on 1/7.  - Ionized calcium normal, 1.1 mmol/L  - CTM, will add oral calcium carbonate    AMS (altered mental status)- (present on admission)  Assessment & Plan  Patient oriented to person and event, not place or time. Per chart review, patient noted to be A&Ox4 last month representing a change from baseline. Likely hepatic encephalopathy 2/2 septic arthritis.  Elevated ammonia to 69 is not diagnostic however it does increase suspicion for hepatic encephalopathy.  Unlikely SBP given lack of fever, abdominal pain, ascites. Covered with cefepime. Patient with mild hyponatremia on admission, otherwise no electrolyte abnormality on BMP. Blood alcohol level negative  -Home lactulose ordered on admission, titrate to 2-3 soft BM per day with concern for hepatic encephalopathy   -1/3 mental status appears dramatically improved.  Due to rapid improvement, more likely consistent with delirium, likely secondary to current infection  -1/8 patient remains A/Ox4  -Treating septic arthritis   -Delirium precautions  -Continue to monitor electrolytes and replete as indicated    Hypokalemia- (present on admission)  Assessment & Plan  Noted on preoperative CMP 1/7.  - Status post repletion with additional 40 mEq 1/7  - Normalized on 1/8  - Continue daily 10 mEq repletion    Coagulopathy (HCC)- (present on admission)  Assessment & Plan  History of alcoholic cirrhosis, INR 1.4 on admission.  Remains elevated  1.41 on repeat testing 1/7  -See #cirrhosis    Thrombocytopenia (HCC)- (present on admission)  Assessment & Plan  Likely secondary to alcoholic cirrhosis.  Baseline appears 70s to 100s.  Platelets ranging 70s to 110s during this admission.  - Continue to monitor for signs of active bleeding  - Consider platelet transfusion prior to surgery    Cirrhosis, alcoholic (HCC)- (present on admission)  Assessment & Plan  History of alcoholic cirrhosis. MELD-NA 1/8 is 10, <2% 3-month mortality risk.  INR 1.4 on admission, 1.41 on 1/7.  AMS present on admission has resolved.  -Home Lasix and potassium  -Lactulose daily ordered at her home dosing.  See #altered mental status    GERD (gastroesophageal reflux disease)- (present on admission)  Assessment & Plan  - Home PPI    Hyperlipidemia- (present on admission)  Assessment & Plan  - Home statin    ELIZABETH (acute kidney injury) (HCC)- (present on admission)  Assessment & Plan  Cr elevated to 1.26 on admission from baseline that ranges from 0.6-1.0.  Elevated BUN to creatinine ratio.  Likely prerenal in the setting of altered mental status and poor p.o. intake.  Patient with some bilateral lower extremity edema, without appreciable ascites and lungs are clear to auscultation.  Patient has echocardiogram from 1 year ago with near normal results.  Will dose fluids gently in setting of known cirrhosis.  - 1 L IV fluids overnight 1/2  - 1/3 Creatinine improved 0.94  - 1/5 creatinine 0.74  - Resolved, no need for additional monitoring unless clinically changed, no concerns as of 1/8    Paroxysmal atrial fibrillation, hx of- (present on admission)  Assessment & Plan  Patient on metoprolol, not on anticoagulation likely due to underlying coagulopathy in setting of cirrhosis.  - Pt in afib since admission, no RVR.  - Tele monitoring  - Home metoprolol    Hypertension- (present on admission)  Assessment & Plan  Chronic, SBP ranging 120-145 since admission.  - Home losartan  - As needed  antihypertensives ordered    Controlled type 2 diabetes mellitus with hyperglycemia, without long-term current use of insulin (HCC)- (present on admission)  Assessment & Plan  Patient with a history of type 2 diabetes.  Most recent A1c in the normal range at 5.4 in January 2023.  Not on outpatient medication.  Patient currently altered, pending SLP eval.  - Continue to monitor blood glucose on BMP, will consider initiating sliding scale insulin if sugars are elevated  - 1/3 AM glucose 104  - 1/5 AM glucose 118  - No need for additional monitoring unless signs of hypo/hyperglycemia present         Core Measures:  Fluids: IV push only, no IV fluids  Lines: Peripheral IV for intravenous access  Abx: Vancomycin  Diet: regular diet  PPX: SCDs/TEDs and pharmacologic prophylaxis contraindicated due to hx of cirrhosis, coagulopathy    CODE Status: Full Code      Disposition  Patient is not medically cleared for discharge.   Anticipate discharge to skilled nursing facility.  I have placed the appropriate orders for post-discharge needs.    I have personally seen and examined the patient at bedside. I discussed the plan of care with patient, family, bedside RN, , and  Mark Hernadez M.d..      Rod Dunn M.D.   PGY-1  [facility]

## 2024-01-08 NOTE — CARE PLAN
The patient is Stable - Low risk of patient condition declining or worsening    Shift Goals  Clinical Goals: Safety / Pain Control / Mobility  Patient Goals: Pain control  Family Goals: na    Progress made toward(s) clinical / shift goals:        Problem: Pain - Standard  Goal: Alleviation of pain or a reduction in pain to the patient’s comfort goal  Outcome: Progressing     Problem: Knowledge Deficit - Standard  Goal: Patient and family/care givers will demonstrate understanding of plan of care, disease process/condition, diagnostic tests and medications  Outcome: Progressing     Problem: Hemodynamics  Goal: Patient's hemodynamics, fluid balance and neurologic status will be stable or improve  Outcome: Progressing     Problem: Fall Risk  Goal: Patient will remain free from falls  Outcome: Progressing       Patient is not progressing towards the following goals:

## 2024-01-08 NOTE — DISCHARGE PLANNING
0864  Agency/Facility Name: [facility]   Outcome: Rory left message to DPA via Teams that Pt can be accepted if Vanco can be switched to Dapto.     CM RN notified.     1127  Agency/Facility Name: [facility]   Spoke To: Rory via Teams   Outcome: DPA inform that Pt has been switched to Dapto and will be medcially cleared for tomorrow. Rory requesting transport to be arrange for tomorrow at 1200.     LISETTE Villalobos notified.
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
## 2024-01-08 NOTE — DISCHARGE PLANNING
Case Management Discharge Planning    Admission Date: 1/2/2024  GMLOS: 5.1  ALOS: 6    6-Clicks ADL Score: 16  6-Clicks Mobility Score: 14  PT and/or OT Eval ordered: Yes  Post-acute Referrals Ordered: Yes  Post-acute Choice Obtained: Yes  Has referral(s) been sent to post-acute provider:  Yes      Anticipated Discharge Dispo: Discharge Disposition: D/T to SNF with Medicare cert in anticipation of skilled care (03)  Accepted by [facility] and [facility].     DME Needed: none    Action(s) Taken: Received a message from [facility] team and pharmacy that we are still waiting for sensitivity. Pt is not clear for discharge yet.    Notified team that [facility] will accept if Vanco is changed to Dapto.     Escalations Completed: n/a    Medically Clear: no-sensitivity pending    Next Steps: follow up with MD    Barriers to Discharge: pending medical clearance.    Is the patient up for discharge tomorrow: hopefully tomorrow.     Addendum: Per MD, IV abx has been changed to Dapto and ALDA Baltazar notified [facility]. Notified ALDA that pt can go to [facility] tomorrow. ALDA sent message to Rory at [facility].     Addendum: Daughter and pt prefers [facility]. LISETTE called Nga at [facility] who said they cannot accept pt if she needs IV abx. Notified pt and daughter that the only facility that accepted is [facility]. Pt is agreeable.

## 2024-01-09 VITALS
HEIGHT: 67 IN | WEIGHT: 188.49 LBS | DIASTOLIC BLOOD PRESSURE: 76 MMHG | OXYGEN SATURATION: 95 % | SYSTOLIC BLOOD PRESSURE: 121 MMHG | RESPIRATION RATE: 18 BRPM | HEART RATE: 76 BPM | TEMPERATURE: 97.2 F | BODY MASS INDEX: 29.58 KG/M2

## 2024-01-09 PROBLEM — E83.51 HYPOCALCEMIA: Status: RESOLVED | Noted: 2024-01-08 | Resolved: 2024-01-09

## 2024-01-09 PROBLEM — N17.9 AKI (ACUTE KIDNEY INJURY) (HCC): Status: RESOLVED | Noted: 2022-07-10 | Resolved: 2024-01-09

## 2024-01-09 PROBLEM — E87.6 HYPOKALEMIA: Status: RESOLVED | Noted: 2020-06-10 | Resolved: 2024-01-09

## 2024-01-09 PROBLEM — Z01.810 PRE-OPERATIVE CARDIOVASCULAR EXAMINATION, HIGH RISK SURGERY: Status: RESOLVED | Noted: 2024-01-08 | Resolved: 2024-01-09

## 2024-01-09 PROBLEM — R41.82 AMS (ALTERED MENTAL STATUS): Status: RESOLVED | Noted: 2024-01-02 | Resolved: 2024-01-09

## 2024-01-09 LAB
BASOPHILS # BLD AUTO: 1.5 % (ref 0–1.8)
BASOPHILS # BLD: 0.06 K/UL (ref 0–0.12)
EOSINOPHIL # BLD AUTO: 0.18 K/UL (ref 0–0.51)
EOSINOPHIL NFR BLD: 4.6 % (ref 0–6.9)
ERYTHROCYTE [DISTWIDTH] IN BLOOD BY AUTOMATED COUNT: 49.9 FL (ref 35.9–50)
HCT VFR BLD AUTO: 33.5 % (ref 37–47)
HGB BLD-MCNC: 10.4 G/DL (ref 12–16)
IMM GRANULOCYTES # BLD AUTO: 0.02 K/UL (ref 0–0.11)
IMM GRANULOCYTES NFR BLD AUTO: 0.5 % (ref 0–0.9)
LYMPHOCYTES # BLD AUTO: 1.34 K/UL (ref 1–4.8)
LYMPHOCYTES NFR BLD: 34.1 % (ref 22–41)
MCH RBC QN AUTO: 26.5 PG (ref 27–33)
MCHC RBC AUTO-ENTMCNC: 31 G/DL (ref 32.2–35.5)
MCV RBC AUTO: 85.5 FL (ref 81.4–97.8)
MONOCYTES # BLD AUTO: 0.37 K/UL (ref 0–0.85)
MONOCYTES NFR BLD AUTO: 9.4 % (ref 0–13.4)
NEUTROPHILS # BLD AUTO: 1.96 K/UL (ref 1.82–7.42)
NEUTROPHILS NFR BLD: 49.9 % (ref 44–72)
NRBC # BLD AUTO: 0 K/UL
NRBC BLD-RTO: 0 /100 WBC (ref 0–0.2)
PLATELET # BLD AUTO: 71 K/UL (ref 164–446)
PLATELETS.RETICULATED NFR BLD AUTO: 1.1 % (ref 0.6–13.1)
PMV BLD AUTO: 10.6 FL (ref 9–12.9)
RBC # BLD AUTO: 3.92 M/UL (ref 4.2–5.4)
TEST NAME 95000: NORMAL
WBC # BLD AUTO: 3.9 K/UL (ref 4.8–10.8)

## 2024-01-09 PROCEDURE — 85025 COMPLETE CBC W/AUTO DIFF WBC: CPT

## 2024-01-09 PROCEDURE — 85055 RETICULATED PLATELET ASSAY: CPT

## 2024-01-09 PROCEDURE — 700102 HCHG RX REV CODE 250 W/ 637 OVERRIDE(OP)

## 2024-01-09 PROCEDURE — A9270 NON-COVERED ITEM OR SERVICE: HCPCS

## 2024-01-09 PROCEDURE — 700102 HCHG RX REV CODE 250 W/ 637 OVERRIDE(OP): Performed by: FAMILY MEDICINE

## 2024-01-09 PROCEDURE — 99238 HOSP IP/OBS DSCHRG MGMT 30/<: CPT | Mod: GC | Performed by: FAMILY MEDICINE

## 2024-01-09 PROCEDURE — A9270 NON-COVERED ITEM OR SERVICE: HCPCS | Performed by: FAMILY MEDICINE

## 2024-01-09 PROCEDURE — 36415 COLL VENOUS BLD VENIPUNCTURE: CPT

## 2024-01-09 RX ORDER — ACETAMINOPHEN 500 MG
1000 TABLET ORAL EVERY 6 HOURS PRN
Qty: 30 TABLET | Refills: 0 | Status: SHIPPED | OUTPATIENT
Start: 2024-01-09 | End: 2024-01-11

## 2024-01-09 RX ORDER — GABAPENTIN 300 MG/1
600 CAPSULE ORAL EVERY 8 HOURS
Qty: 90 CAPSULE | Refills: 3 | Status: SHIPPED | OUTPATIENT
Start: 2024-01-09

## 2024-01-09 RX ORDER — CALCIUM CARBONATE 500 MG/1
500 TABLET, CHEWABLE ORAL DAILY
Qty: 30 TABLET | COMMUNITY
Start: 2024-01-10 | End: 2024-01-11

## 2024-01-09 RX ORDER — LACTULOSE 20 G/30ML
15 SOLUTION ORAL DAILY
Qty: 473 ML | Refills: 0 | Status: SHIPPED | OUTPATIENT
Start: 2024-01-10

## 2024-01-09 RX ORDER — OXYCODONE HYDROCHLORIDE 5 MG/1
5 TABLET ORAL EVERY 6 HOURS PRN
Qty: 30 TABLET | Refills: 0 | Status: ON HOLD | OUTPATIENT
Start: 2024-01-09 | End: 2024-01-22

## 2024-01-09 RX ADMIN — OXYCODONE HYDROCHLORIDE 5 MG: 5 TABLET ORAL at 05:27

## 2024-01-09 RX ADMIN — GABAPENTIN 600 MG: 300 CAPSULE ORAL at 05:30

## 2024-01-09 RX ADMIN — FERROUS SULFATE TAB 325 MG (65 MG ELEMENTAL FE) 325 MG: 325 (65 FE) TAB at 05:30

## 2024-01-09 RX ADMIN — ANTACID TABLETS 500 MG: 500 TABLET, CHEWABLE ORAL at 05:31

## 2024-01-09 RX ADMIN — DOCUSATE SODIUM 50 MG AND SENNOSIDES 8.6 MG 2 TABLET: 8.6; 5 TABLET, FILM COATED ORAL at 05:31

## 2024-01-09 RX ADMIN — TIOTROPIUM BROMIDE INHALATION SPRAY 5 MCG: 3.12 SPRAY, METERED RESPIRATORY (INHALATION) at 06:00

## 2024-01-09 RX ADMIN — ACETAMINOPHEN 1000 MG: 500 TABLET ORAL at 05:27

## 2024-01-09 RX ADMIN — LOSARTAN POTASSIUM 100 MG: 50 TABLET, FILM COATED ORAL at 05:30

## 2024-01-09 RX ADMIN — POTASSIUM CHLORIDE 40 MEQ: 1500 TABLET, EXTENDED RELEASE ORAL at 05:31

## 2024-01-09 RX ADMIN — OMEPRAZOLE 20 MG: 20 CAPSULE, DELAYED RELEASE ORAL at 05:31

## 2024-01-09 RX ADMIN — POTASSIUM CHLORIDE 10 MEQ: 750 TABLET, EXTENDED RELEASE ORAL at 05:32

## 2024-01-09 RX ADMIN — FUROSEMIDE 20 MG: 20 TABLET ORAL at 05:31

## 2024-01-09 RX ADMIN — OXYCODONE HYDROCHLORIDE 5 MG: 5 TABLET ORAL at 11:58

## 2024-01-09 ASSESSMENT — PAIN DESCRIPTION - PAIN TYPE
TYPE: ACUTE PAIN
TYPE: ACUTE PAIN;SURGICAL PAIN;CHRONIC PAIN

## 2024-01-09 ASSESSMENT — FIBROSIS 4 INDEX: FIB4 SCORE: 4.27

## 2024-01-09 NOTE — PROGRESS NOTES
Pt d/c with transport to [facility], PIV removed, armband cut off, all belongings sent with pt. Pt personally checked the room for personal belongings. RN still waiting for a call back from SNF RN to give report. D/c paperwork signed and reviewed with pt/\. . All questions answered

## 2024-01-09 NOTE — DISCHARGE PLANNING
Case Management Discharge Planning    Admission Date: 1/2/2024  GMLOS: 5.1  ALOS: 7    6-Clicks ADL Score: 19  6-Clicks Mobility Score: 14  PT and/or OT Eval ordered: Yes  Post-acute Referrals Ordered: Yes  Post-acute Choice Obtained: Yes  Has referral(s) been sent to post-acute provider:  Yes      Anticipated Discharge Dispo: Discharge Disposition: D/T to SNF with Medicare cert in anticipation of skilled care (03)    DME Needed: No    Action(s) Taken: Updated Provider/Nurse on Discharge Plan    Escalations Completed: None    Medically Clear: Yes    Next Steps: Pt accepted to [facility] and transport scheduled for 1200. Pt aware of plan and agrees.    Barriers to Discharge: None    Is the patient up for discharge tomorrow: No

## 2024-01-09 NOTE — CARE PLAN
Problem: Pain - Standard  Goal: Alleviation of pain or a reduction in pain to the patient’s comfort goal  Outcome: Progressing     Problem: Knowledge Deficit - Standard  Goal: Patient and family/care givers will demonstrate understanding of plan of care, disease process/condition, diagnostic tests and medications  Outcome: Progressing     Problem: Hemodynamics  Goal: Patient's hemodynamics, fluid balance and neurologic status will be stable or improve  Outcome: Progressing     Problem: Fluid Volume  Goal: Fluid volume balance will be maintained  Outcome: Progressing     Problem: Urinary - Renal Perfusion  Goal: Ability to achieve and maintain adequate renal perfusion and functioning will improve  Outcome: Progressing     Problem: Respiratory  Goal: Patient will achieve/maintain optimum respiratory ventilation and gas exchange  Outcome: Progressing     Problem: Physical Regulation  Goal: Diagnostic test results will improve  Outcome: Progressing     Problem: Physical Regulation  Goal: Signs and symptoms of infection will decrease  Outcome: Progressing     Problem: Skin Integrity  Goal: Skin integrity is maintained or improved  Outcome: Progressing     Problem: Fall Risk  Goal: Patient will remain free from falls  Outcome: Progressing   The patient is Stable - Low risk of patient condition declining or worsening    Shift Goals  Clinical Goals: VSS, safety  Patient Goals: to go home  Family Goals: EDMUND    Progress made toward(s) clinical / shift goals:  VSS, safety    Patient is not progressing towards the following goals:

## 2024-01-09 NOTE — THERAPY
"Occupational Therapy  Daily Treatment     Patient Name: April Alicia  Age:  63 y.o., Sex:  female  Medical Record #: [id number]  Today's Date: 1/8/2024     Precautions  Precautions: Fall Risk  Comments: WBAT    Assessment    Pt seen for OT treatment. Pt donned pants and gown with min A, and performed functional mobility with CGA. Pt required multiple attempts to stand due to pain in her knee. Pt reported that she has good support from her family at home and that they came to visit her earlier in the day. Pt educated regarding the role of OT, pathology of bedrest, and recommendation to be up for all meals. Pt current functional performance limited by pain, impaired activity tolerance, impaired cognition, and generalized weakness. Pt will continue to benefit from skilled OT while admitted to acute care.     Plan    Treatment Plan Status: Continue Current Treatment Plan  Type of Treatment: Self Care / Activities of Daily Living, Adaptive Equipment, Neuro Re-Education / Balance, Therapeutic Exercises, Therapeutic Activity  Treatment Frequency: 3 Times per Week  Treatment Duration: Until Therapy Goals Met    DC Equipment Recommendations: Unable to determine at this time  Discharge Recommendations: Recommend post-acute placement for additional occupational therapy services prior to discharge home    Subjective    \"If you bring me a gown, I want to get up.\"     Objective     01/08/24 7649   Precautions   Precautions Fall Risk   Pain   Pain Scales 0 to 10 Scale    Pain 0 - 10 Group   Therapist Pain Assessment During Activity;Nurse Notified  (not rated, agreeable to session)   Non Verbal Descriptors   Non Verbal Scale  Calm   Cognition    Cognition / Consciousness X   Speech/ Communication Hard of Hearing   Level of Consciousness Alert   New Learning Impaired   Attention Impaired   Comments Pleasant and cooperative   Other Treatments   Other Treatments Provided Discussed the pathology of bedrest and encouraged pt to " continue to walk to the bathroom instead of using bedpan and to be up in chair for meals. Pt receptive.   Balance   Sitting Balance (Static) Fair +   Sitting Balance (Dynamic) Fair   Standing Balance (Static) Fair -   Standing Balance (Dynamic) Fair -   Weight Shift Sitting Fair   Weight Shift Standing Fair   Skilled Intervention Verbal Cuing;Facilitation   Comments w/ FWW, no LOB   Bed Mobility    Supine to Sit Contact Guard Assist   Sit to Supine   (up to chair post)   Scooting Supervised   Skilled Intervention Verbal Cuing;Facilitation   Comments HOB slightly elevated, extra time required   Activities of Daily Living   Upper Body Dressing Minimal Assist  (don gown)   Lower Body Dressing Minimal Assist  (don pants)   Toileting   (declined the need)   Skilled Intervention Verbal Cuing;Facilitation   Functional Mobility   Sit to Stand Contact Guard Assist   Bed, Chair, Wheelchair Transfer Contact Guard Assist   Transfer Method Stand Step   Mobility EOB>hallway>chair   Skilled Intervention Verbal Cuing;Facilitation   Comments w/ FWW   Visual Perception   Visual Perception  Not Tested   Activity Tolerance   Sitting in Chair up to chair post   Sitting Edge of Bed <5 min   Standing ~5 min   Comments limited by weakness/pain, fatigues quickly   Patient / Family Goals   Patient / Family Goal #1 to go home   Goal #1 Outcome Progressing as expected   Short Term Goals   Short Term Goal # 1 Pt will perform LB dressing with supv   Goal Outcome # 1 Progressing as expected   Short Term Goal # 2 Pt will perform ADL transfer w/ supv   Goal Outcome # 2 Progressing as expected   Short Term Goal # 3 Pt will perform standing g/h w/ supv   Goal Outcome # 3 Goal not met   Short Term Goal # 4 Pt will perform toilet hygiene w/ supv   Goal Outcome # 4 Goal not met   Education Group   Education Provided Role of Occupational Therapist;Activities of Daily Living;Pathology of bedrest   Role of Occupational Therapist Patient Response  Patient;Acceptance;Explanation;Verbal Demonstration   ADL Patient Response Patient;Acceptance;Explanation;Demonstration;Verbal Demonstration;Action Demonstration   Pathology of Bedrest Patient Response Patient;Acceptance;Explanation;Demonstration;Verbal Demonstration;Action Demonstration   Occupational Therapy Treatment Plan    O.T. Treatment Plan Continue Current Treatment Plan   Treatment Interventions Self Care / Activities of Daily Living;Adaptive Equipment;Neuro Re-Education / Balance;Therapeutic Exercises;Therapeutic Activity   Treatment Frequency 3 Times per Week   Duration Until Therapy Goals Met

## 2024-01-09 NOTE — DISCHARGE SUMMARY
[facility] MEDICINE DISCHARGE SUMMARY     Admit Date:  1/2/2024       Discharge Date:   1/9/2024    Attending: Mark Hernadez M.d.     Resident: Rod Dunn M.D. (PGY-1)    PATIENT: April Alicia; [id number]; 1960    Diagnoses (includes active and resolved):  Principal Problem:    Septic arthritis (HCC) (POA: Yes)  Active Problems:    Cirrhosis, alcoholic (HCC) (Chronic) (POA: Yes)    Thrombocytopenia (HCC) (POA: Yes)    Coagulopathy (HCC) (POA: Yes)    Controlled type 2 diabetes mellitus with hyperglycemia, without long-term current use of insulin (HCC) (Chronic) (POA: Yes)    Hypertension (Chronic) (POA: Yes)    Paroxysmal atrial fibrillation, hx of (POA: Yes)    Hyperlipidemia (POA: Yes)    GERD (gastroesophageal reflux disease) (POA: Yes)  Resolved Problems:    Pre-operative cardiovascular examination, high risk surgery (POA: Clinically Undetermined)    Hypokalemia (POA: Yes)    AMS (altered mental status) (POA: Yes)    Hypocalcemia (POA: No)    ELIZABETH (acute kidney injury) (HCC) (POA: Yes)       Patient Active Problem List   Diagnosis    Migraine with aura and without status migrainosus, not intractable    Cirrhosis, alcoholic (HCC)    Diabetic polyneuropathy associated with type 2 diabetes mellitus (HCC)    Thrombocytopenia (HCC)    Controlled type 2 diabetes mellitus with hyperglycemia, without long-term current use of insulin (HCC)    Hypertension    Knee effusion, left    Paroxysmal atrial fibrillation, hx of    Hyperlipidemia    Iron deficiency anemia    Coagulopathy (HCC)    Erosive osteoarthritis of right hand    Vitamin D deficiency    Anemia of chronic disease    GERD (gastroesophageal reflux disease)    Murmur    Age-related cataract of both eyes    Chronic instability of knee, left knee    Chronic pain syndrome    Degeneration of lumbar intervertebral disc    Type 2 diabetes mellitus with diabetic neuropathy, unspecified (HCC)    Difficulty in walking, not elsewhere classified     Hypermetropia    Hypertensive retinopathy    Long-term current use of opiate analgesic    Mitral valve regurgitation    Presbyopia    Sleep apnea    Mild intermittent asthma without complication    Chronic pain of both knees    Septic arthritis (HCC)        Hospital Summary (Brief Narrative):       April  is a 63 y.o.  Female with a PMHx of alcoholic cirrhosis, left knee replacement status post revision, septic arthritis of left knee, diet-controlled type 2 diabetes, rate controlled atrial fibrillation not on anticoagulation, admitted on 1/2/2024 for septic arthritis and altered mental status concern for early sepsis.    The patient has had bilateral knee replacements in 2017 and 2018, with multiple complications of the left knee since 2017, including 3 washout/revisions.  Her most recent revision was in November 2023, which was performed after another incidence of septic joint followed by 6 weeks of IV antibiotics.  Patient had resolution of symptoms, but quickly began to experience warmth, pain, and swelling of the left knee again about 1 week prior to this admission.    The patient presented with concern for recurrent septic joint as well as altered mental status. She was started on IV cefepime which was transitioned to vancomycin shortly after admission. Her mental status improved on day 2 of admission and remained appropriate throughout the rest of the hospital course. This was presumed to be secondary to her infection.    The patient was followed by ID and Ortho throughout her hospital course. ID initially recommended an extended course of IV abx. With poor response to therapy, they then recommended a possibility of repeat washout to obtain clearance of the joint. However, given the complexity and high surgical risk in this patient, Orthopedics stated that they felt the patient was not appropriate for any more revisions or washouts.  The only next logical step for surgery would be for amputation.  ID agreed  that the patient was unlikely to improve with only antibiotics alone.  This was discussed with the patient and her family.  They expressed understanding and agreement that amputation is the only next definitive step in the plan of care.  However, due to their understanding the patient's high surgical risk and high risk for poor outcomes, they are requesting to postpone the surgery for about 2 weeks in order to give time to get the patient's care organized and get her affairs in order, as well as have visits from family prior to surgery.    ID and Ortho were informed of this decision, and they agree it is appropriate to postpone surgery for 2 weeks to allow for affairs to be put in order, with the understanding that the only definitive next option for care will likely be amputation.  While waiting for surgery, patient will be discharged to SNF, IV antibiotics switched to daptomycin in coordination with pharmacy as this is able to be administered at SNF.  Patient and family were informed of next step in plan of care.  They are in agreement with current plan, including close follow-up in 2 weeks with orthopedics to plan for definitive treatment with surgical amputation of the left lower extremity.    Consultants:      Infectious disease, orthopedics    Procedures:        None    Discharge Medications:        Medication Reconciliation Completed     Medication List        START taking these medications        Instructions   acetaminophen 500 MG Tabs  Commonly known as: Tylenol   Take 2 Tablets by mouth every 6 hours as needed (first line for any pain rating. Reach out to MD for further pain medication if ineffective).  Dose: 1,000 mg     calcium carbonate 500 MG Chew  Start taking on: January 10, 2024  Commonly known as: Tums   Chew 1 Tablet every day.  Dose: 500 mg     lactulose 20 GM/30ML Soln  Start taking on: January 10, 2024   Take 15 mL by mouth every day.  Dose: 15 mL     NS SOLN 50 mL with DAPTOmycin 500 MG SOLR  700 mg   Infuse 700 mg into a venous catheter every 24 hours for 36 days.  Dose: 700 mg            CHANGE how you take these medications        Instructions   gabapentin 300 MG Caps  What changed:   medication strength  how much to take  when to take this  Commonly known as: Neurontin   Take 2 Capsules by mouth every 8 hours.  Dose: 600 mg     oxyCODONE immediate-release 5 MG Tabs  What changed:   medication strength  how much to take  when to take this  reasons to take this  Commonly known as: Roxicodone   Take 1 Tablet by mouth every 6 hours as needed for Severe Pain for up to 8 days.  Dose: 5 mg            CONTINUE taking these medications        Instructions   atorvastatin 20 MG Tabs  Commonly known as: Lipitor   Take 1 Tablet by mouth every evening.  Dose: 20 mg     ferrous sulfate 325 (65 Fe) MG tablet   Take 325 mg by mouth every day.  Dose: 325 mg     furosemide 20 MG Tabs  Commonly known as: Lasix   Take 1 Tablet by mouth every day.  Dose: 20 mg     losartan 100 MG Tabs  Commonly known as: Cozaar   Take 100 mg by mouth every day.  Dose: 100 mg     metoprolol SR 25 MG Tb24  Commonly known as: Toprol XL   Take 1 Tablet by mouth every evening.  Dose: 25 mg     pantoprazole 20 MG tablet  Commonly known as: Protonix   Take 20 mg by mouth every day.  Dose: 20 mg     potassium chloride 10 MEQ capsule  Commonly known as: Micro-K   Take 1 Capsule by mouth every day.  Dose: 10 mEq     Spiriva HandiHaler 18 MCG Caps  Generic drug: tiotropium   Place 1 Capsule into inhaler and inhale every day.  Dose: 1 Capsule              Discharge Criteria: The patient met 2-midnight criteria for an inpatient stay at the time of discharge.    Disposition:  to skilled nursing facility    Diet:  diabetic diet    Activity:  Activity as tolerated. Physical therapy as prescribed. Occupational therapy as prescribed.    Code Status: Full Code      PCP: Anum Gatica M.D.    Follow Up:  [facility]  [address]  [place] [address]  [phone]        Anum Gatica M.D.  [address]  [facility] [id number] [phone]    Schedule an appointment as soon as possible for a visit in 2 week(s)      Wild Luz M.D.  [address]  [address]  [place] [id number] [phone]    Follow up in 2 week(s)       No future appointments.     Instructions:       The patient was instructed to return to the ER in the event of worsening symptoms. I have counseled the patient on the importance of compliance and the patient has agreed to proceed with all medical recommendations and follow up plan indicated above.   The patient understands that all medications come with benefits and risks. Risks may include permanent injury or death and these risks can be minimized with close reassessment and monitoring.            #########################################################    24 Hour Events: No acute events overnight, patient states she has been coming to terms with the possibility of poor outcomes with amputation surgery.  She states she is ready for surgery after her 2 weeks at SNF, but would like to have these 2 weeks in order to get affairs in order, including hoping that her son will be able to visit from [place] before the surgery.  Given strict return precautions, emphasized the importance of returning for surgery.  She states understanding and agreement with the current plan of care.  Patient's daughter was also called and informed of plan of care, including high importance of follow-up.    OBJECTIVE:     Vitals:    01/08/24 2058 01/08/24 2100 01/08/24 2330 01/09/24 0520   BP: 139/89 139/89 132/84 107/71   Pulse:   95 74   Resp:   20 18   Temp:    36.5 °C (97.7 °F)   TempSrc:   Temporal Temporal   SpO2:   95% 96%   Weight:       Height:           Intake/Output Summary (Last 24 hours) at 1/9/2024 0745  Last data filed at 1/8/2024 2000  Gross per 24 hour   Intake 1730 ml   Output 0 ml   Net 1730 ml       Physical Exam  Vitals  reviewed.   Constitutional:       General: She is not in acute distress.     Appearance: Normal appearance. She is normal weight. She is not toxic-appearing.   HENT:      Head: Normocephalic and atraumatic.      Right Ear: External ear normal. Decreased hearing noted.      Left Ear: External ear normal. Decreased hearing noted.      Nose: Nose normal. No congestion.      Mouth/Throat:      Mouth: Mucous membranes are moist.      Pharynx: Oropharynx is clear.   Eyes:      Extraocular Movements: Extraocular movements intact.      Conjunctiva/sclera: Conjunctivae normal.      Pupils: Pupils are equal, round, and reactive to light.   Cardiovascular:      Rate and Rhythm: Normal rate and regular rhythm.      Pulses: Normal pulses.      Heart sounds: Normal heart sounds. No murmur heard.     No friction rub. No gallop.   Pulmonary:      Effort: Pulmonary effort is normal. No respiratory distress.      Breath sounds: Normal breath sounds. No stridor. No wheezing, rhonchi or rales.   Abdominal:      General: Abdomen is flat. Bowel sounds are normal. There is no distension.      Palpations: Abdomen is soft.      Tenderness: There is no abdominal tenderness. There is no guarding or rebound.   Musculoskeletal:         General: Swelling present.      Cervical back: Normal range of motion and neck supple.      Comments: Left knee with swelling, moderate tenderness, erythema and slight rubor, large surgical scar to bilateral anterior knees.  Noted tips of sutures poking out of incision site from left knee, no sign of redness or swelling or drainage at the actual incision site.  Unchanged from prior exam.   Skin:     General: Skin is warm and dry.      Capillary Refill: Capillary refill takes less than 2 seconds.      Coloration: Skin is not jaundiced.      Findings: No bruising.      Comments: Old skin grafts present on BLE   Neurological:      General: No focal deficit present.      Mental Status: She is alert and oriented to  person, place, and time. Mental status is at baseline.      Cranial Nerves: No cranial nerve deficit.      Motor: No weakness.   Psychiatric:         Mood and Affect: Mood normal.         Behavior: Behavior normal.         Thought Content: Thought content normal.         Judgment: Judgment normal.         LABS:  Recent Labs     01/06/24  0830 01/07/24  0142 01/08/24  0159 01/09/24  0313   WBC 5.0 4.8 4.6* 3.9*   RBC 4.93 4.20 4.06* 3.92*   HEMOGLOBIN 13.5 11.5* 10.9* 10.4*   HEMATOCRIT 40.2 34.4* 33.4* 33.5*   MCV 81.5 81.9 82.3 85.5   MCH 27.4 27.4 26.8* 26.5*   RDW 45.6 45.9 47.6 49.9   PLATELETCT 81* 73* 68* 71*   MPV 10.8 9.9  --  10.6   NEUTSPOLYS 62.50 57.70 58.70 49.90   LYMPHOCYTES 27.30 30.10 29.40 34.10   MONOCYTES 5.60 6.90 6.70 9.40   EOSINOPHILS 3.20 3.60 3.20 4.60   BASOPHILS 1.20 1.10 1.10 1.50     Recent Labs     01/07/24  1443 01/08/24  0159   SODIUM 136 140   POTASSIUM 3.4* 3.9   CHLORIDE 109 113*   CO2 16* 16*   BUN 26* 28*   CREATININE 0.89 0.90   CALCIUM 6.6* 7.0*   ALBUMIN 2.4* 2.2*     Estimated GFR/CRCL = Estimated Creatinine Clearance: 71.6 mL/min (by C-G formula based on SCr of 0.9 mg/dL).  Recent Labs     01/07/24  1443 01/08/24  0159   GLUCOSE 174* 119*     Recent Labs     01/07/24  1443 01/08/24  0159   ASTSGOT 27 25   ALTSGPT 31 27   TBILIRUBIN 0.4 0.4   ALKPHOSPHAT 206* 194*   GLOBULIN 3.9* 3.4   INR 1.41*  --      Recent Labs     01/08/24  0159   CPKTOTAL 70         Recent Labs     01/07/24  1443   INR 1.41*   APTT 39.4*       MICROBIOLOGY:   Results       Procedure Component Value Units Date/Time    FLUID CULTURE W/GRAM STAIN [[id number]]  (Abnormal) Collected: 01/02/24 1615    Order Status: Completed Specimen: Synovial Fluid Updated: 01/05/24 1106     Significant Indicator POS     Source SYNO     Site Left Knee     Culture Result -     Gram Stain Result Many WBCs.  No organisms seen.       Culture Result Corynebacterium striatum group  Rare growth      Narrative:      L knee  L knee     GRAM STAIN [[id number]] Collected: 01/02/24 1615    Order Status: Completed Specimen: Synovial Updated: 01/02/24 1834     Significant Indicator .     Source SYNO     Site Left Knee     Gram Stain Result Many WBCs.  No organisms seen.      Narrative:      L knee  L knee    JOINT INFECTION PANEL [[id number]] Collected: 01/02/24 1615    Order Status: Canceled Specimen: Body Fluid from Synovial Fluid               IMAGING:   EC-ECHOCARDIOGRAM COMPLETE W/O CONT   Final Result      DX-KNEE COMPLETE 4+ LEFT   Final Result         1.  No radiographic evidence of acute injury.      2. Diffuse soft tissue swelling about the knee especially in the suprapatellar region suspicious for inflammation and/or infection.      3. Previous revision of left total knee replacement.            Rod Dunn M.D.   PGY-1  [facility]

## 2024-01-09 NOTE — DISCHARGE PLANNING
Petra requesting updated LOC. Update submitted but in Manual Review as already has initial LOC.  Spoke to Rory, she is aware and can take pt as scheduled at noon today.

## 2024-01-09 NOTE — RESULT ENCOUNTER NOTE
Discussed sensitivities with inpatient infectious disease pharmacy 1/9/2024.  They state that daptomycin that the patient was discharged with should provide adequate coverage given the sensitivity shown.  No need to adjust therapy at this time.

## 2024-01-10 ENCOUNTER — HOSPITAL ENCOUNTER (OUTPATIENT)
Facility: MEDICAL CENTER | Age: 64
End: 2024-01-10
Attending: ORTHOPAEDIC SURGERY | Admitting: ORTHOPAEDIC SURGERY
Payer: MEDICAID

## 2024-01-10 LAB
BACTERIA FLD AEROBE CULT: ABNORMAL
BACTERIA FLD AEROBE CULT: ABNORMAL
GRAM STN SPEC: ABNORMAL
SIGNIFICANT IND 70042: ABNORMAL
SITE SITE: ABNORMAL
SOURCE SOURCE: ABNORMAL

## 2024-01-11 ENCOUNTER — APPOINTMENT (OUTPATIENT)
Dept: RADIOLOGY | Facility: MEDICAL CENTER | Age: 64
DRG: 474 | End: 2024-01-11
Attending: EMERGENCY MEDICINE
Payer: MEDICAID

## 2024-01-11 ENCOUNTER — HOSPITAL ENCOUNTER (INPATIENT)
Facility: MEDICAL CENTER | Age: 64
LOS: 11 days | DRG: 474 | End: 2024-01-22
Attending: EMERGENCY MEDICINE | Admitting: INTERNAL MEDICINE
Payer: MEDICAID

## 2024-01-11 DIAGNOSIS — N30.01 ACUTE CYSTITIS WITH HEMATURIA: ICD-10-CM

## 2024-01-11 DIAGNOSIS — R11.0 NAUSEA: ICD-10-CM

## 2024-01-11 DIAGNOSIS — A41.9 SEPSIS, DUE TO UNSPECIFIED ORGANISM, UNSPECIFIED WHETHER ACUTE ORGAN DYSFUNCTION PRESENT (HCC): ICD-10-CM

## 2024-01-11 DIAGNOSIS — U07.1 COVID-19: ICD-10-CM

## 2024-01-11 DIAGNOSIS — Z89.612 S/P AKA (ABOVE KNEE AMPUTATION), LEFT (HCC): ICD-10-CM

## 2024-01-11 DIAGNOSIS — M00.862 ARTHRITIS OF LEFT KNEE DUE TO OTHER BACTERIA (HCC): ICD-10-CM

## 2024-01-11 PROBLEM — Z71.89 ACP (ADVANCE CARE PLANNING): Status: ACTIVE | Noted: 2024-01-11

## 2024-01-11 LAB
ALBUMIN SERPL BCP-MCNC: 2.6 G/DL (ref 3.2–4.9)
ALBUMIN/GLOB SERPL: 0.7 G/DL
ALP SERPL-CCNC: 212 U/L (ref 30–99)
ALT SERPL-CCNC: 24 U/L (ref 2–50)
ANION GAP SERPL CALC-SCNC: 9 MMOL/L (ref 7–16)
APPEARANCE UR: CLEAR
AST SERPL-CCNC: 26 U/L (ref 12–45)
BACTERIA #/AREA URNS HPF: NEGATIVE /HPF
BASOPHILS # BLD AUTO: 0.7 % (ref 0–1.8)
BASOPHILS # BLD: 0.04 K/UL (ref 0–0.12)
BILIRUB SERPL-MCNC: 0.7 MG/DL (ref 0.1–1.5)
BILIRUB UR QL STRIP.AUTO: NEGATIVE
BUN SERPL-MCNC: 23 MG/DL (ref 8–22)
CALCIUM ALBUM COR SERPL-MCNC: 8.9 MG/DL (ref 8.5–10.5)
CALCIUM SERPL-MCNC: 7.8 MG/DL (ref 8.5–10.5)
CHLORIDE SERPL-SCNC: 108 MMOL/L (ref 96–112)
CK SERPL-CCNC: 50 U/L (ref 0–154)
CO2 SERPL-SCNC: 19 MMOL/L (ref 20–33)
COLOR UR: YELLOW
CREAT SERPL-MCNC: 0.75 MG/DL (ref 0.5–1.4)
EOSINOPHIL # BLD AUTO: 0.12 K/UL (ref 0–0.51)
EOSINOPHIL NFR BLD: 2.1 % (ref 0–6.9)
EPI CELLS #/AREA URNS HPF: NEGATIVE /HPF
ERYTHROCYTE [DISTWIDTH] IN BLOOD BY AUTOMATED COUNT: 49.5 FL (ref 35.9–50)
FLUAV RNA SPEC QL NAA+PROBE: NEGATIVE
FLUBV RNA SPEC QL NAA+PROBE: NEGATIVE
GFR SERPLBLD CREATININE-BSD FMLA CKD-EPI: 89 ML/MIN/1.73 M 2
GLOBULIN SER CALC-MCNC: 4 G/DL (ref 1.9–3.5)
GLUCOSE BLD STRIP.AUTO-MCNC: 183 MG/DL (ref 65–99)
GLUCOSE BLD STRIP.AUTO-MCNC: 96 MG/DL (ref 65–99)
GLUCOSE SERPL-MCNC: 200 MG/DL (ref 65–99)
GLUCOSE UR STRIP.AUTO-MCNC: NEGATIVE MG/DL
HCT VFR BLD AUTO: 35.9 % (ref 37–47)
HGB BLD-MCNC: 11.8 G/DL (ref 12–16)
HYALINE CASTS #/AREA URNS LPF: NORMAL /LPF
IMM GRANULOCYTES # BLD AUTO: 0.03 K/UL (ref 0–0.11)
IMM GRANULOCYTES NFR BLD AUTO: 0.5 % (ref 0–0.9)
INR PPP: 1.43 (ref 0.87–1.13)
KETONES UR STRIP.AUTO-MCNC: NEGATIVE MG/DL
LACTATE SERPL-SCNC: 1.1 MMOL/L (ref 0.5–2)
LACTATE SERPL-SCNC: 1.2 MMOL/L (ref 0.5–2)
LACTATE SERPL-SCNC: 1.2 MMOL/L (ref 0.5–2)
LACTATE SERPL-SCNC: 1.4 MMOL/L (ref 0.5–2)
LEUKOCYTE ESTERASE UR QL STRIP.AUTO: ABNORMAL
LYMPHOCYTES # BLD AUTO: 0.99 K/UL (ref 1–4.8)
LYMPHOCYTES NFR BLD: 17.1 % (ref 22–41)
MCH RBC QN AUTO: 27.6 PG (ref 27–33)
MCHC RBC AUTO-ENTMCNC: 32.9 G/DL (ref 32.2–35.5)
MCV RBC AUTO: 83.9 FL (ref 81.4–97.8)
MICRO URNS: ABNORMAL
MONOCYTES # BLD AUTO: 0.36 K/UL (ref 0–0.85)
MONOCYTES NFR BLD AUTO: 6.2 % (ref 0–13.4)
NEUTROPHILS # BLD AUTO: 4.25 K/UL (ref 1.82–7.42)
NEUTROPHILS NFR BLD: 73.4 % (ref 44–72)
NITRITE UR QL STRIP.AUTO: NEGATIVE
NRBC # BLD AUTO: 0 K/UL
NRBC BLD-RTO: 0 /100 WBC (ref 0–0.2)
PH UR STRIP.AUTO: 6 [PH] (ref 5–8)
PLATELET # BLD AUTO: 116 K/UL (ref 164–446)
PMV BLD AUTO: 10.8 FL (ref 9–12.9)
POTASSIUM SERPL-SCNC: 4.5 MMOL/L (ref 3.6–5.5)
PROT SERPL-MCNC: 6.6 G/DL (ref 6–8.2)
PROT UR QL STRIP: NEGATIVE MG/DL
PROTHROMBIN TIME: 17.6 SEC (ref 12–14.6)
RBC # BLD AUTO: 4.28 M/UL (ref 4.2–5.4)
RBC # URNS HPF: NORMAL /HPF
RBC UR QL AUTO: ABNORMAL
RSV RNA SPEC QL NAA+PROBE: NEGATIVE
SARS-COV-2 RNA RESP QL NAA+PROBE: DETECTED
SODIUM SERPL-SCNC: 136 MMOL/L (ref 135–145)
SP GR UR STRIP.AUTO: 1.01
UROBILINOGEN UR STRIP.AUTO-MCNC: 0.2 MG/DL
WBC # BLD AUTO: 5.8 K/UL (ref 4.8–10.8)
WBC #/AREA URNS HPF: NORMAL /HPF

## 2024-01-11 PROCEDURE — 87186 SC STD MICRODIL/AGAR DIL: CPT

## 2024-01-11 PROCEDURE — 99223 1ST HOSP IP/OBS HIGH 75: CPT | Performed by: INTERNAL MEDICINE

## 2024-01-11 PROCEDURE — 36415 COLL VENOUS BLD VENIPUNCTURE: CPT

## 2024-01-11 PROCEDURE — 85025 COMPLETE CBC W/AUTO DIFF WBC: CPT

## 2024-01-11 PROCEDURE — 83605 ASSAY OF LACTIC ACID: CPT

## 2024-01-11 PROCEDURE — 80053 COMPREHEN METABOLIC PANEL: CPT

## 2024-01-11 PROCEDURE — 0241U HCHG SARS-COV-2 COVID-19 NFCT DS RESP RNA 4 TRGT ED POC: CPT

## 2024-01-11 PROCEDURE — 700105 HCHG RX REV CODE 258: Performed by: INTERNAL MEDICINE

## 2024-01-11 PROCEDURE — 87086 URINE CULTURE/COLONY COUNT: CPT

## 2024-01-11 PROCEDURE — 71045 X-RAY EXAM CHEST 1 VIEW: CPT

## 2024-01-11 PROCEDURE — 770001 HCHG ROOM/CARE - MED/SURG/GYN PRIV*

## 2024-01-11 PROCEDURE — 700102 HCHG RX REV CODE 250 W/ 637 OVERRIDE(OP): Performed by: INTERNAL MEDICINE

## 2024-01-11 PROCEDURE — A9270 NON-COVERED ITEM OR SERVICE: HCPCS | Mod: UD | Performed by: EMERGENCY MEDICINE

## 2024-01-11 PROCEDURE — 96365 THER/PROPH/DIAG IV INF INIT: CPT

## 2024-01-11 PROCEDURE — 82962 GLUCOSE BLOOD TEST: CPT | Mod: 91

## 2024-01-11 PROCEDURE — 87040 BLOOD CULTURE FOR BACTERIA: CPT | Mod: 91

## 2024-01-11 PROCEDURE — 85610 PROTHROMBIN TIME: CPT

## 2024-01-11 PROCEDURE — 87077 CULTURE AEROBIC IDENTIFY: CPT

## 2024-01-11 PROCEDURE — A9270 NON-COVERED ITEM OR SERVICE: HCPCS | Performed by: INTERNAL MEDICINE

## 2024-01-11 PROCEDURE — 96367 TX/PROPH/DG ADDL SEQ IV INF: CPT

## 2024-01-11 PROCEDURE — 81001 URINALYSIS AUTO W/SCOPE: CPT

## 2024-01-11 PROCEDURE — 700111 HCHG RX REV CODE 636 W/ 250 OVERRIDE (IP): Mod: JZ,UD | Performed by: EMERGENCY MEDICINE

## 2024-01-11 PROCEDURE — 99285 EMERGENCY DEPT VISIT HI MDM: CPT

## 2024-01-11 PROCEDURE — 700102 HCHG RX REV CODE 250 W/ 637 OVERRIDE(OP): Mod: UD | Performed by: EMERGENCY MEDICINE

## 2024-01-11 PROCEDURE — 700105 HCHG RX REV CODE 258: Mod: UD | Performed by: EMERGENCY MEDICINE

## 2024-01-11 PROCEDURE — 700111 HCHG RX REV CODE 636 W/ 250 OVERRIDE (IP): Mod: JZ | Performed by: INTERNAL MEDICINE

## 2024-01-11 PROCEDURE — 8E0ZXY6 ISOLATION: ICD-10-PCS | Performed by: INTERNAL MEDICINE

## 2024-01-11 PROCEDURE — 82550 ASSAY OF CK (CPK): CPT

## 2024-01-11 RX ORDER — GABAPENTIN 300 MG/1
600 CAPSULE ORAL EVERY 8 HOURS
Status: DISCONTINUED | OUTPATIENT
Start: 2024-01-11 | End: 2024-01-22 | Stop reason: HOSPADM

## 2024-01-11 RX ORDER — LACTULOSE 20 G/30ML
15 SOLUTION ORAL DAILY
Status: DISCONTINUED | OUTPATIENT
Start: 2024-01-11 | End: 2024-01-22

## 2024-01-11 RX ORDER — OXYCODONE HYDROCHLORIDE 5 MG/1
5 TABLET ORAL EVERY 6 HOURS PRN
Status: DISCONTINUED | OUTPATIENT
Start: 2024-01-11 | End: 2024-01-16

## 2024-01-11 RX ORDER — ATORVASTATIN CALCIUM 20 MG/1
20 TABLET, FILM COATED ORAL NIGHTLY
Status: DISCONTINUED | OUTPATIENT
Start: 2024-01-11 | End: 2024-01-22 | Stop reason: HOSPADM

## 2024-01-11 RX ORDER — ACETAMINOPHEN 325 MG/1
650 TABLET ORAL EVERY 6 HOURS PRN
Status: DISCONTINUED | OUTPATIENT
Start: 2024-01-11 | End: 2024-01-22 | Stop reason: HOSPADM

## 2024-01-11 RX ORDER — FUROSEMIDE 20 MG/1
20 TABLET ORAL DAILY
Status: DISCONTINUED | OUTPATIENT
Start: 2024-01-12 | End: 2024-01-22 | Stop reason: HOSPADM

## 2024-01-11 RX ORDER — ENOXAPARIN SODIUM 100 MG/ML
40 INJECTION SUBCUTANEOUS DAILY
Status: DISCONTINUED | OUTPATIENT
Start: 2024-01-11 | End: 2024-01-17

## 2024-01-11 RX ORDER — SODIUM CHLORIDE, SODIUM LACTATE, POTASSIUM CHLORIDE, CALCIUM CHLORIDE 600; 310; 30; 20 MG/100ML; MG/100ML; MG/100ML; MG/100ML
1000 INJECTION, SOLUTION INTRAVENOUS ONCE
Status: COMPLETED | OUTPATIENT
Start: 2024-01-11 | End: 2024-01-11

## 2024-01-11 RX ORDER — PROMETHAZINE HYDROCHLORIDE 25 MG/1
12.5-25 SUPPOSITORY RECTAL EVERY 4 HOURS PRN
Status: DISCONTINUED | OUTPATIENT
Start: 2024-01-11 | End: 2024-01-22 | Stop reason: HOSPADM

## 2024-01-11 RX ORDER — PROMETHAZINE HYDROCHLORIDE 25 MG/1
12.5-25 TABLET ORAL EVERY 4 HOURS PRN
Status: DISCONTINUED | OUTPATIENT
Start: 2024-01-11 | End: 2024-01-22 | Stop reason: HOSPADM

## 2024-01-11 RX ORDER — ONDANSETRON 2 MG/ML
4 INJECTION INTRAMUSCULAR; INTRAVENOUS EVERY 4 HOURS PRN
Status: DISCONTINUED | OUTPATIENT
Start: 2024-01-11 | End: 2024-01-22 | Stop reason: HOSPADM

## 2024-01-11 RX ORDER — LOSARTAN POTASSIUM 50 MG/1
100 TABLET ORAL DAILY
Status: DISCONTINUED | OUTPATIENT
Start: 2024-01-12 | End: 2024-01-22 | Stop reason: HOSPADM

## 2024-01-11 RX ORDER — OMEPRAZOLE 20 MG/1
20 CAPSULE, DELAYED RELEASE ORAL DAILY
Status: DISCONTINUED | OUTPATIENT
Start: 2024-01-12 | End: 2024-01-22 | Stop reason: HOSPADM

## 2024-01-11 RX ORDER — BISACODYL 10 MG
10 SUPPOSITORY, RECTAL RECTAL
Status: DISCONTINUED | OUTPATIENT
Start: 2024-01-11 | End: 2024-01-21

## 2024-01-11 RX ORDER — POLYETHYLENE GLYCOL 3350 17 G/17G
1 POWDER, FOR SOLUTION ORAL
Status: DISCONTINUED | OUTPATIENT
Start: 2024-01-11 | End: 2024-01-21

## 2024-01-11 RX ORDER — ACETAMINOPHEN 325 MG/1
650 TABLET ORAL ONCE
Status: COMPLETED | OUTPATIENT
Start: 2024-01-11 | End: 2024-01-11

## 2024-01-11 RX ORDER — TIOTROPIUM BROMIDE 18 UG/1
1 CAPSULE ORAL; RESPIRATORY (INHALATION) DAILY
Status: DISCONTINUED | OUTPATIENT
Start: 2024-01-11 | End: 2024-01-11

## 2024-01-11 RX ORDER — METOPROLOL SUCCINATE 25 MG/1
25 TABLET, EXTENDED RELEASE ORAL NIGHTLY
Status: DISCONTINUED | OUTPATIENT
Start: 2024-01-11 | End: 2024-01-22 | Stop reason: HOSPADM

## 2024-01-11 RX ORDER — PROCHLORPERAZINE EDISYLATE 5 MG/ML
5-10 INJECTION INTRAMUSCULAR; INTRAVENOUS EVERY 4 HOURS PRN
Status: DISCONTINUED | OUTPATIENT
Start: 2024-01-11 | End: 2024-01-22 | Stop reason: HOSPADM

## 2024-01-11 RX ORDER — ONDANSETRON 4 MG/1
4 TABLET, ORALLY DISINTEGRATING ORAL EVERY 4 HOURS PRN
Status: DISCONTINUED | OUTPATIENT
Start: 2024-01-11 | End: 2024-01-22 | Stop reason: HOSPADM

## 2024-01-11 RX ORDER — AMOXICILLIN 250 MG
2 CAPSULE ORAL 2 TIMES DAILY
Status: DISCONTINUED | OUTPATIENT
Start: 2024-01-11 | End: 2024-01-21

## 2024-01-11 RX ORDER — SODIUM CHLORIDE, SODIUM LACTATE, POTASSIUM CHLORIDE, AND CALCIUM CHLORIDE .6; .31; .03; .02 G/100ML; G/100ML; G/100ML; G/100ML
500 INJECTION, SOLUTION INTRAVENOUS
Status: DISCONTINUED | OUTPATIENT
Start: 2024-01-11 | End: 2024-01-22 | Stop reason: HOSPADM

## 2024-01-11 RX ADMIN — TIOTROPIUM BROMIDE INHALATION SPRAY 5 MCG: 3.12 SPRAY, METERED RESPIRATORY (INHALATION) at 15:05

## 2024-01-11 RX ADMIN — ATORVASTATIN CALCIUM 20 MG: 20 TABLET, FILM COATED ORAL at 20:46

## 2024-01-11 RX ADMIN — OXYCODONE 5 MG: 5 TABLET ORAL at 20:46

## 2024-01-11 RX ADMIN — SODIUM CHLORIDE, POTASSIUM CHLORIDE, SODIUM LACTATE AND CALCIUM CHLORIDE 1000 ML: 600; 310; 30; 20 INJECTION, SOLUTION INTRAVENOUS at 11:24

## 2024-01-11 RX ADMIN — LACTULOSE 15 ML: 10 SOLUTION ORAL; RECTAL at 15:03

## 2024-01-11 RX ADMIN — GABAPENTIN 600 MG: 300 CAPSULE ORAL at 15:03

## 2024-01-11 RX ADMIN — METOPROLOL SUCCINATE 25 MG: 25 TABLET, EXTENDED RELEASE ORAL at 20:46

## 2024-01-11 RX ADMIN — CEFEPIME 2 G: 2 INJECTION, POWDER, FOR SOLUTION INTRAVENOUS at 11:25

## 2024-01-11 RX ADMIN — GABAPENTIN 600 MG: 300 CAPSULE ORAL at 22:00

## 2024-01-11 RX ADMIN — DAPTOMYCIN 700 MG: 500 INJECTION, POWDER, LYOPHILIZED, FOR SOLUTION INTRAVENOUS at 15:02

## 2024-01-11 RX ADMIN — ACETAMINOPHEN 650 MG: 325 TABLET, FILM COATED ORAL at 11:18

## 2024-01-11 ASSESSMENT — LIFESTYLE VARIABLES
TOTAL SCORE: 0
DOES PATIENT WANT TO STOP DRINKING: NO
ON A TYPICAL DAY WHEN YOU DRINK ALCOHOL HOW MANY DRINKS DO YOU HAVE: 0
CONSUMPTION TOTAL: NEGATIVE
HAVE PEOPLE ANNOYED YOU BY CRITICIZING YOUR DRINKING: NO
AVERAGE NUMBER OF DAYS PER WEEK YOU HAVE A DRINK CONTAINING ALCOHOL: 0
TOTAL SCORE: 0
HAVE YOU EVER FELT YOU SHOULD CUT DOWN ON YOUR DRINKING: NO
EVER FELT BAD OR GUILTY ABOUT YOUR DRINKING: NO
TOTAL SCORE: 0
HOW MANY TIMES IN THE PAST YEAR HAVE YOU HAD 5 OR MORE DRINKS IN A DAY: 0
EVER HAD A DRINK FIRST THING IN THE MORNING TO STEADY YOUR NERVES TO GET RID OF A HANGOVER: NO
ALCOHOL_USE: NO

## 2024-01-11 ASSESSMENT — ENCOUNTER SYMPTOMS
MYALGIAS: 0
CLAUDICATION: 0
NAUSEA: 0
ORTHOPNEA: 0
WEAKNESS: 0
ABDOMINAL PAIN: 0
SPEECH CHANGE: 0
VOMITING: 0
DIARRHEA: 0
BLURRED VISION: 0
DIZZINESS: 0
WEIGHT LOSS: 0
HEMOPTYSIS: 0
PHOTOPHOBIA: 0
CHILLS: 1
CONSTIPATION: 0
DOUBLE VISION: 0
COUGH: 0
NECK PAIN: 0
FEVER: 1
PALPITATIONS: 0

## 2024-01-11 ASSESSMENT — COGNITIVE AND FUNCTIONAL STATUS - GENERAL
MOBILITY SCORE: 13
DRESSING REGULAR LOWER BODY CLOTHING: A LITTLE
WALKING IN HOSPITAL ROOM: A LOT
SUGGESTED CMS G CODE MODIFIER DAILY ACTIVITY: CK
DRESSING REGULAR UPPER BODY CLOTHING: A LITTLE
DAILY ACTIVITIY SCORE: 19
SUGGESTED CMS G CODE MODIFIER MOBILITY: CL
TOILETING: A LITTLE
MOVING FROM LYING ON BACK TO SITTING ON SIDE OF FLAT BED: A LOT
TURNING FROM BACK TO SIDE WHILE IN FLAT BAD: A LITTLE
STANDING UP FROM CHAIR USING ARMS: A LOT
HELP NEEDED FOR BATHING: A LOT
MOVING TO AND FROM BED TO CHAIR: A LITTLE
CLIMB 3 TO 5 STEPS WITH RAILING: TOTAL

## 2024-01-11 ASSESSMENT — FIBROSIS 4 INDEX: FIB4 SCORE: 4.27

## 2024-01-11 NOTE — ASSESSMENT & PLAN NOTE
1/21/2024  Patient is being followed by Ortho before and planning for surgery as outpatient  Patient came with fever  Ortho was consulted and planning for OR and I&D during this admission  Continue daptomycin, CPK is ok, blood cxs ngtd  Surgery to be performed 1/16  Keep in house  US LE venous duplex negative for DVT  WBC is normal, afebrile  Status post left above-knee amputation 1/16  Okay to DC antibiotics per ID

## 2024-01-11 NOTE — ASSESSMENT & PLAN NOTE
1/21/2024  Likely related to covid, but high risk for bacteremia  Defer PICC line until blood cultures result (may not need further antibiotics depending on level of amputation)  NO decadron at this time for covid  Afebrile for several days now

## 2024-01-11 NOTE — ED TRIAGE NOTES
BIB EMS to [facility], transferred from [facility] per EMS for PICC line placement for antibiotics for an infection to leg.  Hx of L knee septic arthritis, recently discharged from this facility on 1/9 to [facility] to have extended IV antibiotic therapy w/ the next likely plan of surgical amputation.  Patient denies pain.  Febrile 102.3.  patient in gown, on monitor, chart up for ERP.

## 2024-01-11 NOTE — ED PROVIDER NOTES
ED Provider Note    CHIEF COMPLAINT  Chief Complaint   Patient presents with    Other     EMS states patient here for PICC line placement for antibiotics     Wound Infection       EXTERNAL RECORDS REVIEWED  Reviewed records from recent hospitalization.    HPI/ROS  LIMITATION TO HISTORY   Patient has altered mentation.  OUTSIDE HISTORIAN(S):  None    April Alicia is a 63 y.o. female who presents to the emergency department for evaluation of fever and altered mentation.  The patient has a lower extremity wound and septic arthritis.  She was recently hospitalized and plan was to delay surgery secondary to the patient and family's wishes.  She comes in today for PICC line placement but is febrile and altered.  No other history can be obtained.    PAST MEDICAL HISTORY   has a past medical history of Abnormal finding of lung (12/27/2022), Acute exacerbation of chronic obstructive pulmonary disease (COPD) ([facility]) (01/27/2020), Alcoholic cirrhosis ([facility]), Arthritis, ASTHMA, Atrial fibrillation ([facility]), Dry eyes (11/16/2017), Edentulous, Emphysema of lung ([facility]), H/O: hysterectomy (12/05/2019), Heart burn, Hepatic encephalopathy ([facility]) (12/27/2022), Hepatitis C infection (07/28/2022), History of rheumatic fever (09/04/2017), Hypertension, 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[facility]), Medication overuse headache (08/15/2017), Mitral regurgitation, Pancytopenia ([facility]) (07/26/2022), Pneumonia (02/2020), Primary osteoarthritis of left knee (08/25/2020), Prosthetic joint infection ([facility]) (2018), Protein-calorie malnutrition, severe ([facility]) (08/01/2022), Right leg DVT ([facility]) (2018), Seizure disorder ([facility]), Septic arthritis of knee, left ([facility]) (07/19/2022), Septic arthritis of shoulder, left ([facility]) (07/17/2022), Septic shock due to Pseudomonas species ([facility]) (10/30/2023), and Type 2 diabetes mellitus ([facility]) (12/27/2022).    SURGICAL HISTORY   has a past surgical history that includes gyn surgery (1982); gyn surgery (2003); colonoscopy with  "clipping (10/28/2015); colonoscopy with sclerotherapy (10/28/2015); colonoscopy with tattooing (10/28/2015); irrigation & debridement ortho (Right, 9/3/2017); knee arthroplasty total (Right, 2018); total knee arthroplasty (Left, 2020); irrigation & debridement general (Left, 2022); revise knee joint replace,all parts (Left, 2022); revise knee joint replace,all parts (Left, 2022); irrigation & debridement general (Left, 2022); and total knee arthroplasty (Left, 10/30/2023).    FAMILY HISTORY  Family History   Problem Relation Age of Onset    Diabetes Mother     Seizures Father     Heart Attack Father 45    Diabetes Brother     Alcohol abuse Brother     Dementia Brother     Diabetes Brother        SOCIAL HISTORY  Social History     Tobacco Use    Smoking status: Former     Current packs/day: 0.00     Types: Cigarettes     Quit date: 2016     Years since quittin.0    Smokeless tobacco: Former     Quit date: 2021    Tobacco comments:     a few a day when I can   Vaping Use    Vaping Use: Never used   Substance and Sexual Activity    Alcohol use: Not Currently     Comment: hx of AUD    Drug use: Not Currently    Sexual activity: Not Currently     Partners: Male     Birth control/protection: Post-Menopausal     Comment: Has boyfriend, not currently sexually active       CURRENT MEDICATIONS  Home Medications    **Home medications have not yet been reviewed for this encounter**         ALLERGIES  Allergies   Allergen Reactions    Nkda [No Known Drug Allergy]        PHYSICAL EXAM  VITAL SIGNS: /85   Pulse 85   Temp (!) 39.1 °C (102.3 °F) (Oral)   Resp 20   Ht 1.727 m (5' 8\")   Wt 85.3 kg (188 lb)   LMP 2008   SpO2 96%   BMI 28.59 kg/m²    Constitutional: Awake but slow to respond ill-appearing  HENT: Normocephalic, Atraumatic, Bilateral external ears normal, dry mucous membranes  Eyes: PERRL, EOMI, Conjunctiva normal, No discharge.   Neck: Normal range of " motion,  Cardiovascular: Normal heart rate, Normal rhythm, No murmurs, No rubs, No gallops.   Thorax & Lungs: Normal breath sounds, No respiratory distress, No wheezing  Abdomen: Bowel sounds normal, Soft, No tenderness  Skin: Warm, Dry, No erythema, No rash.   Musculoskeletal: Good range of motion in all major joints.          DIAGNOSTIC STUDIES / PROCEDURES    DX-CHEST-PORTABLE (1 VIEW)   Final Result      Cardiomegaly and mild interstitial opacity/edema.      No significant consolidation or pleural effusion.      US-EXTREMITY VENOUS LOWER UNILAT LEFT    (Results Pending)         RADIOLOGY  I have independently interpreted the diagnostic imaging associated with this visit and am waiting the final reading from the radiologist.   Radiologist interpretation:     DX-CHEST-PORTABLE (1 VIEW)   Final Result      Cardiomegaly and mild interstitial opacity/edema.      No significant consolidation or pleural effusion.      US-EXTREMITY VENOUS LOWER UNILAT LEFT    (Results Pending)         COURSE & MEDICAL DECISION MAKING    ED Observation Status? No; Patient does not meet criteria for ED Observation.     INITIAL ASSESSMENT, COURSE AND PLAN  Care Narrative:     63-year-old woman presents with fever and altered mental status.  She is being treated for septic arthritis and extremity cellulitis.  She needs surgical intervention but per family and patient wishes this was delayed.  This is planned for the 16th.  Patient comes in today for a PICC line but she is febrile and ill-appearing.    Broad official diagnosis was considered including but not limited to sepsis, worsening of her infection or bacteremia.      The patient is worked up with labs and her CBC is unremarkable.  I spoke with pharmacy and they will start her on some antibiotics.  I will also consider viral etiology and ordered COVID influenza test.  This turns out to be positive.    This point it turns out she was on Zyvox instead of daptomycin and now she has a  fever it is unclear to me if there is a worsening of her bacteremia or sepsis or bacterial infection or from a viral etiology.  Spoke with Dr. Gray on-call for orthopedics he will see the patient and determine whether or not more urgent or emergent surgery is needed    Patient hospitalist for further workup and treatment discussed case with Dr. Leon and care transferred at that time    HYDRATION: Based on the patient's presentation of Sepsis the patient was given IV fluids. IV Hydration was used because oral hydration was not adequate alone. Upon recheck following hydration, the patient was improved.      ADDITIONAL PROBLEM LIST  Diabetes    DISPOSITION AND DISCUSSIONS  I have discussed management of the patient with the following physicians and CANDIDO's:          Discussion of management with other QHP or appropriate source(s): Pharmacy team        Decision tools and prescription drugs considered including, but not limited to: sepsis protocol.    FINAL DIAGNOSIS  1. Sepsis, due to unspecified organism, unspecified whether acute organ dysfunction present (HCC)    2. Arthritis of left knee due to other bacteria (HCC)    3. COVID-19           Electronically signed by: Tyrell Garcia M.D., 1/11/2024 9:50 AM

## 2024-01-11 NOTE — RESULT ENCOUNTER NOTE
Reviewed susceptibility testing.  Discussed with inpatient pharmacy 1/9.  They state that the daptomycin the patient was discharged with is appropriate given the susceptibility testings.  Patient was discharged to SNF with daptomycin.  However, shortly after arrival, [facility] for the message back to the hospital stating that they were unable to continue IV antibiotics long-term without a PICC line in place.  Requesting options for oral treatment.  Dr. Vazquez, infectious disease, was consulted.  Therapy changed to linezolid 600 mg p.o. twice daily for 5 weeks.  Prescription handwritten and delivered to charge nurse desk on T700.  Message sent on secure messaging talia confirming acceptance and receipt of the handwritten prescription by the staff later that evening on 1/9.

## 2024-01-11 NOTE — ED NOTES
Unable to address med rec at this time. Patient is a resident at [facility] and [facility]. MAR not sent with patient. Facility contacted ([phone]) to request copy of MAR via fax.    Addendum: med rec completed per MAR received from [facility].  Allergies reviewed.    ANTICOAGULATION: NONE.    Outpatient antibiotics within the last 30 days: Patient has an order for daptomycin however per MAR this has not been administered at the facility.    Chencho Tarango, PhT

## 2024-01-11 NOTE — CONSULTS
ID consult chronic infection left knee  Patient discharged to SNF 1/9/2024  Called 1/9-documentation of conversation by Dr. Dunn is erroneous. I did NOT recommend Zyvox for 5 weeks.  It was reported the facility could not complete course of treatment with daptomycin as she was discharged without a PICC line  I advised to continue daptomycin to complete 4 weeks of IV antibiotics then switch to oral Zyvox 600 mg PO BID through  (2/1/24-2/15/2024)  She is now in ER with fever  Agree with dapto +cefepime pending blood culture results  Ortho consulted      ADDENDUM:  She is COVID +: likely cause of fever  On room air O2 sat 96%-monitor and supportive care  Planned for OR  KHADIJAH Raines

## 2024-01-11 NOTE — H&P
Hospital Medicine History & Physical Note    Date of Service  1/11/2024    Primary Care Physician  Anum Gatica M.D.    Consultants  Ortho and ID    Code Status  DNAR/DNI    Chief Complaint  Chief Complaint   Patient presents with    Other     EMS states patient here for PICC line placement for antibiotics     Wound Infection       History of Presenting Illness    63-year-old female with history of obesity, COPD, diabetes, alcoholic cirrhosis, A-fib, hypertension and recurrent knee infection presented 1/11 from outside facility for fever evaluation.  Patient was recently discharged from hospital on 1/9 for septic arthritis on her left knee, patient was discharged on daptomycin to the rehab center and follow-up with orthopedic for possible I&D later.  However on the day of admission patient started having fever with altered mental status and confusion, denied any chest pain or shortness of breath, patient has swelling and warmth on her left knee.  On admission patient was found to have fever 102, no leukocytosis, lactic acid was 1.2 and COVID test was positive, chest x-ray showed possible pulmonary edema with no significant pneumonia, patient was on room air.  Ortho was consulted for possible I&D, ID also was consulted to continue daptomycin.      I discussed the plan of care with patient, bedside RN, and ED physician .    Review of Systems  Review of Systems   Constitutional:  Positive for chills, fever and malaise/fatigue. Negative for weight loss.   HENT:  Negative for ear pain, hearing loss and tinnitus.    Eyes:  Negative for blurred vision, double vision and photophobia.   Respiratory:  Negative for cough and hemoptysis.    Cardiovascular:  Negative for chest pain, palpitations, orthopnea and claudication.   Gastrointestinal:  Negative for abdominal pain, constipation, diarrhea, nausea and vomiting.   Genitourinary:  Negative for dysuria, frequency and urgency.   Musculoskeletal:  Positive for joint pain.  Negative for myalgias and neck pain.   Skin:  Negative for rash.   Neurological:  Negative for dizziness, speech change and weakness.       Past Medical History   has a past medical history of Abnormal finding of lung (12/27/2022), Acute exacerbation of chronic obstructive pulmonary disease (COPD) ([facility]) (01/27/2020), Alcoholic cirrhosis ([facility]), Arthritis, ASTHMA, Atrial fibrillation ([facility]), Dry eyes (11/16/2017), Edentulous, Emphysema of lung ([facility]), H/O: hysterectomy (12/05/2019), Heart burn, Hepatic encephalopathy ([facility]) (12/27/2022), Hepatitis C infection (07/28/2022), History of rheumatic fever (09/04/2017), Hypertension, 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[facility]), Medication overuse headache (08/15/2017), Mitral regurgitation, Pancytopenia ([facility]) (07/26/2022), Pneumonia (02/2020), Primary osteoarthritis of left knee (08/25/2020), Prosthetic joint infection ([facility]) (2018), Protein-calorie malnutrition, severe ([facility]) (08/01/2022), Right leg DVT ([facility]) (2018), Seizure disorder ([facility]), Septic arthritis of knee, left ([facility]) (07/19/2022), Septic arthritis of shoulder, left ([facility]) (07/17/2022), Septic shock due to Pseudomonas species ([facility]) (10/30/2023), and Type 2 diabetes mellitus ([facility]) (12/27/2022).    Surgical History   has a past surgical history that includes gyn surgery (1982); gyn surgery (2003); colonoscopy with clipping (10/28/2015); colonoscopy with sclerotherapy (10/28/2015); colonoscopy with tattooing (10/28/2015); irrigation & debridement ortho (Right, 9/3/2017); knee arthroplasty total (Right, 2018); pr total knee arthroplasty (Left, 8/24/2020); irrigation & debridement general (Left, 7/17/2022); pr revise knee joint replace,all parts (Left, 7/19/2022); pr revise knee joint replace,all parts (Left, 12/28/2022); irrigation & debridement general (Left, 12/28/2022); and pr total knee arthroplasty (Left, 10/30/2023).     Family History  family history includes Alcohol abuse in her brother; Dementia in her brother; Diabetes in  her brother, brother, and mother; Heart Attack (age of onset: 45) in her father; Seizures in her father.   Family history reviewed with patient. There is no family history that is pertinent to the chief complaint.     Social History   reports that she quit smoking about 7 years ago. Her smoking use included cigarettes. She quit smokeless tobacco use about 2 years ago. She reports that she does not currently use alcohol. She reports that she does not currently use drugs.    Allergies  No Known Allergies      Medications  Prior to Admission Medications   Prescriptions Last Dose Informant Patient Reported? Taking?   NS SOLN 50 mL with DAPTOmycin 500 MG SOLR 700 mg   No No   Sig: Infuse 700 mg into a venous catheter every 24 hours for 36 days.   SPIRIVA HANDIHALER 18 MCG Cap  Patient Yes No   Sig: Place 1 Capsule into inhaler and inhale every day.   acetaminophen (TYLENOL) 500 MG Tab   No No   Sig: Take 2 Tablets by mouth every 6 hours as needed (first line for any pain rating. Reach out to MD for further pain medication if ineffective).   atorvastatin (LIPITOR) 20 MG Tab  Patient No No   Sig: Take 1 Tablet by mouth every evening.   calcium carbonate (TUMS) 500 MG Chew Tab   No No   Sig: Chew 1 Tablet every day.   ferrous sulfate 325 (65 Fe) MG tablet  Patient Yes No   Sig: Take 325 mg by mouth every day.   furosemide (LASIX) 20 MG Tab  Patient No No   Sig: Take 1 Tablet by mouth every day.   gabapentin (NEURONTIN) 300 MG Cap   No No   Sig: Take 2 Capsules by mouth every 8 hours.   lactulose 20 GM/30ML Solution   No No   Sig: Take 15 mL by mouth every day.   losartan (COZAAR) 100 MG Tab  Patient Yes No   Sig: Take 100 mg by mouth every day.   metoprolol SR (TOPROL XL) 25 MG TABLET SR 24 HR  Patient No No   Sig: Take 1 Tablet by mouth every evening.   oxyCODONE immediate-release (ROXICODONE) 5 MG Tab   No No   Sig: Take 1 Tablet by mouth every 6 hours as needed for Severe Pain for up to 8 days.   pantoprazole (PROTONIX)  20 MG tablet  Patient Yes No   Sig: Take 20 mg by mouth every day.   potassium chloride (MICRO-K) 10 MEQ capsule  Patient No No   Sig: Take 1 Capsule by mouth every day.      Facility-Administered Medications: None       Physical Exam  Temp:  [36.7 °C (98.1 °F)-39.1 °C (102.3 °F)] 36.7 °C (98.1 °F)  Pulse:  [75-95] 80  Resp:  [16-20] 17  BP: (118-142)/(70-85) 134/83  SpO2:  [93 %-98 %] 98 %  Blood Pressure: (!) 142/78   Temperature: (!) 39.1 °C (102.3 °F)   Pulse: 84   Respiration: 20   Pulse Oximetry: 93 %       Physical Exam  Constitutional:       General: She is not in acute distress.     Appearance: She is obese. She is ill-appearing.   HENT:      Head: Normocephalic and atraumatic.   Eyes:      General: No scleral icterus.  Cardiovascular:      Rate and Rhythm: Normal rate.      Heart sounds: No murmur heard.  Pulmonary:      Effort: No respiratory distress.      Breath sounds: No wheezing or rales.   Abdominal:      General: There is no distension.      Tenderness: There is no abdominal tenderness. There is no right CVA tenderness or guarding.   Musculoskeletal:         General: Swelling, tenderness and signs of injury present.      Right lower leg: No edema.      Left lower leg: Edema present.   Skin:     Coloration: Skin is not jaundiced or pale.      Findings: No bruising or lesion.   Neurological:      Cranial Nerves: No cranial nerve deficit.      Motor: No weakness.         Laboratory:  Recent Labs     01/09/24  0313 01/11/24  1005   WBC 3.9* 5.8   RBC 3.92* 4.28   HEMOGLOBIN 10.4* 11.8*   HEMATOCRIT 33.5* 35.9*   MCV 85.5 83.9   MCH 26.5* 27.6   MCHC 31.0* 32.9   RDW 49.9 49.5   PLATELETCT 71* 116*   MPV 10.6 10.8     Recent Labs     01/11/24  1005   SODIUM 136   POTASSIUM 4.5   CHLORIDE 108   CO2 19*   GLUCOSE 200*   BUN 23*   CREATININE 0.75   CALCIUM 7.8*     Recent Labs     01/11/24  1005   ALTSGPT 24   ASTSGOT 26   ALKPHOSPHAT 212*   TBILIRUBIN 0.7   GLUCOSE 200*     Recent Labs     01/11/24  1258  "  INR 1.43*     No results for input(s): \"NTPROBNP\" in the last 72 hours.      No results for input(s): \"TROPONINT\" in the last 72 hours.    Imaging:  DX-CHEST-PORTABLE (1 VIEW)   Final Result      Cardiomegaly and mild interstitial opacity/edema.      No significant consolidation or pleural effusion.      US-EXTREMITY VENOUS LOWER UNILAT LEFT    (Results Pending)       X-Ray:  I have personally reviewed the images and compared with prior images.  EKG:  I have personally reviewed the images and compared with prior images.    Assessment/Plan:  Justification for Admission Status  I anticipate this patient will require at least two midnights for appropriate medical management, necessitating inpatient admission because septic arthritis    Patient will need a Med/Surg bed on EMERGENCY service .  The need is secondary to septic arthritis.    * Septic arthritis (HCC)- (present on admission)  Assessment & Plan  Patient is being followed by Ortho before and planning for surgery as outpatient  Patient came with fever  Ortho was consulted and planning for OR and I&D during this admission  Continue daptomycin  ID on board    COVID-19  Assessment & Plan  No respiratory failure  Patient is on room air  However patient has fever  Close monitoring and continue supportive treatment    Sepsis (HCC)- (present on admission)  Assessment & Plan  This is Sepsis Present on admission  SIRS criteria identified on my evaluation include: Fever, with temperature greater than 100.9 deg F and Tachycardia, with heart rate greater than 90 BPM  Clinical indicators of end organ dysfunction include Toxic Metabolic Encephalopathy  Source is COVID and septic arthritis  Sepsis protocol initiated  Crystalloid Fluid Administration: Resuscitation volume of  ordered. Reason that resuscitation volume of less than 30ml/kg was ordered concern for causing harm given CHF  IV antibiotics as appropriate for source of sepsis  Reassessment: I have reassessed the " patient's hemodynamic status    Sleep apnea- (present on admission)  Assessment & Plan  CPAP at night    Type 2 diabetes mellitus with diabetic neuropathy, unspecified (HCC)- (present on admission)  Assessment & Plan  Check A1c sliding scale      Fever- (present on admission)  Assessment & Plan  Possible related to COVID however cannot rule out septic arthritis  Continue monitoring  Continue daptomycin  Blood culture    Paroxysmal atrial fibrillation, hx of- (present on admission)  Assessment & Plan  No RVR  Continue metoprolol and losartan  Patient is not on anticoagulation before    ACP (advance care planning)  Assessment & Plan  1/11, the plan of care was discussed in detail with the patient, answered all her questions, discussed the POLST and DNR, patient agreed for DNR/DNI.  Time 25 minutes    Metabolic encephalopathy- (present on admission)  Assessment & Plan  Likely related to fever and sepsis   improved  Continue nonpharmacological treatment            VTE prophylaxis: SCDs/TEDs and heparin ppx

## 2024-01-11 NOTE — ED NOTES
Bedside report from Robby MONTAÑO. Pt connected to BP and pulse ox, currently on RA. Pt high fall risk, standard precautions in place and pt using call light appropriately. Additional blood drawn and sent to lab.

## 2024-01-11 NOTE — ASSESSMENT & PLAN NOTE
1/21/2024  A1c 5.7  Continue ISS, BS's remain mildly elevated, continue for now for planned surgery

## 2024-01-12 ENCOUNTER — APPOINTMENT (OUTPATIENT)
Dept: RADIOLOGY | Facility: MEDICAL CENTER | Age: 64
DRG: 474 | End: 2024-01-12
Attending: INTERNAL MEDICINE
Payer: MEDICAID

## 2024-01-12 PROBLEM — D64.9 ANEMIA: Status: ACTIVE | Noted: 2024-01-12

## 2024-01-12 LAB
ANION GAP SERPL CALC-SCNC: 7 MMOL/L (ref 7–16)
BASOPHILS # BLD AUTO: 1.4 % (ref 0–1.8)
BASOPHILS # BLD: 0.06 K/UL (ref 0–0.12)
BUN SERPL-MCNC: 22 MG/DL (ref 8–22)
CALCIUM SERPL-MCNC: 7.4 MG/DL (ref 8.5–10.5)
CHLORIDE SERPL-SCNC: 112 MMOL/L (ref 96–112)
CO2 SERPL-SCNC: 18 MMOL/L (ref 20–33)
CREAT SERPL-MCNC: 0.51 MG/DL (ref 0.5–1.4)
CRP SERPL HS-MCNC: 8.19 MG/DL (ref 0–0.75)
EOSINOPHIL # BLD AUTO: 0.14 K/UL (ref 0–0.51)
EOSINOPHIL NFR BLD: 3.2 % (ref 0–6.9)
ERYTHROCYTE [DISTWIDTH] IN BLOOD BY AUTOMATED COUNT: 48.5 FL (ref 35.9–50)
EST. AVERAGE GLUCOSE BLD GHB EST-MCNC: 117 MG/DL
GFR SERPLBLD CREATININE-BSD FMLA CKD-EPI: 104 ML/MIN/1.73 M 2
GLUCOSE BLD STRIP.AUTO-MCNC: 128 MG/DL (ref 65–99)
GLUCOSE BLD STRIP.AUTO-MCNC: 147 MG/DL (ref 65–99)
GLUCOSE BLD STRIP.AUTO-MCNC: 227 MG/DL (ref 65–99)
GLUCOSE SERPL-MCNC: 124 MG/DL (ref 65–99)
HBA1C MFR BLD: 5.7 % (ref 4–5.6)
HCT VFR BLD AUTO: 32.9 % (ref 37–47)
HGB BLD-MCNC: 10.5 G/DL (ref 12–16)
IMM GRANULOCYTES # BLD AUTO: 0.01 K/UL (ref 0–0.11)
IMM GRANULOCYTES NFR BLD AUTO: 0.2 % (ref 0–0.9)
LACTATE SERPL-SCNC: 0.6 MMOL/L (ref 0.5–2)
LYMPHOCYTES # BLD AUTO: 1.15 K/UL (ref 1–4.8)
LYMPHOCYTES NFR BLD: 25.9 % (ref 22–41)
MAGNESIUM SERPL-MCNC: 1.2 MG/DL (ref 1.5–2.5)
MCH RBC QN AUTO: 26.4 PG (ref 27–33)
MCHC RBC AUTO-ENTMCNC: 31.9 G/DL (ref 32.2–35.5)
MCV RBC AUTO: 82.7 FL (ref 81.4–97.8)
MONOCYTES # BLD AUTO: 0.39 K/UL (ref 0–0.85)
MONOCYTES NFR BLD AUTO: 8.8 % (ref 0–13.4)
NEUTROPHILS # BLD AUTO: 2.69 K/UL (ref 1.82–7.42)
NEUTROPHILS NFR BLD: 60.5 % (ref 44–72)
NRBC # BLD AUTO: 0 K/UL
NRBC BLD-RTO: 0 /100 WBC (ref 0–0.2)
PLATELET # BLD AUTO: 89 K/UL (ref 164–446)
PLATELETS.RETICULATED NFR BLD AUTO: 1.5 % (ref 0.6–13.1)
PMV BLD AUTO: 9.8 FL (ref 9–12.9)
POTASSIUM SERPL-SCNC: 4.2 MMOL/L (ref 3.6–5.5)
PROCALCITONIN SERPL-MCNC: 0.13 NG/ML
RBC # BLD AUTO: 3.98 M/UL (ref 4.2–5.4)
SODIUM SERPL-SCNC: 137 MMOL/L (ref 135–145)
WBC # BLD AUTO: 4.4 K/UL (ref 4.8–10.8)

## 2024-01-12 PROCEDURE — 85025 COMPLETE CBC W/AUTO DIFF WBC: CPT

## 2024-01-12 PROCEDURE — 80048 BASIC METABOLIC PNL TOTAL CA: CPT

## 2024-01-12 PROCEDURE — 99232 SBSQ HOSP IP/OBS MODERATE 35: CPT | Performed by: INTERNAL MEDICINE

## 2024-01-12 PROCEDURE — A9270 NON-COVERED ITEM OR SERVICE: HCPCS | Performed by: INTERNAL MEDICINE

## 2024-01-12 PROCEDURE — 36415 COLL VENOUS BLD VENIPUNCTURE: CPT

## 2024-01-12 PROCEDURE — 83605 ASSAY OF LACTIC ACID: CPT

## 2024-01-12 PROCEDURE — 84145 PROCALCITONIN (PCT): CPT

## 2024-01-12 PROCEDURE — 97163 PT EVAL HIGH COMPLEX 45 MIN: CPT

## 2024-01-12 PROCEDURE — 700102 HCHG RX REV CODE 250 W/ 637 OVERRIDE(OP): Performed by: INTERNAL MEDICINE

## 2024-01-12 PROCEDURE — 770001 HCHG ROOM/CARE - MED/SURG/GYN PRIV*

## 2024-01-12 PROCEDURE — 700111 HCHG RX REV CODE 636 W/ 250 OVERRIDE (IP): Performed by: INTERNAL MEDICINE

## 2024-01-12 PROCEDURE — 82962 GLUCOSE BLOOD TEST: CPT

## 2024-01-12 PROCEDURE — 85055 RETICULATED PLATELET ASSAY: CPT

## 2024-01-12 PROCEDURE — 700105 HCHG RX REV CODE 258: Performed by: INTERNAL MEDICINE

## 2024-01-12 PROCEDURE — 83735 ASSAY OF MAGNESIUM: CPT

## 2024-01-12 PROCEDURE — 93971 EXTREMITY STUDY: CPT | Mod: LT

## 2024-01-12 PROCEDURE — 700111 HCHG RX REV CODE 636 W/ 250 OVERRIDE (IP): Mod: JZ | Performed by: INTERNAL MEDICINE

## 2024-01-12 PROCEDURE — 86140 C-REACTIVE PROTEIN: CPT

## 2024-01-12 PROCEDURE — 83036 HEMOGLOBIN GLYCOSYLATED A1C: CPT

## 2024-01-12 RX ORDER — MAGNESIUM SULFATE HEPTAHYDRATE 40 MG/ML
4 INJECTION, SOLUTION INTRAVENOUS ONCE
Status: COMPLETED | OUTPATIENT
Start: 2024-01-12 | End: 2024-01-12

## 2024-01-12 RX ADMIN — GABAPENTIN 600 MG: 300 CAPSULE ORAL at 21:03

## 2024-01-12 RX ADMIN — ENOXAPARIN SODIUM 40 MG: 100 INJECTION SUBCUTANEOUS at 17:38

## 2024-01-12 RX ADMIN — METOPROLOL SUCCINATE 25 MG: 25 TABLET, EXTENDED RELEASE ORAL at 21:03

## 2024-01-12 RX ADMIN — OMEPRAZOLE 20 MG: 20 CAPSULE, DELAYED RELEASE ORAL at 05:22

## 2024-01-12 RX ADMIN — GABAPENTIN 600 MG: 300 CAPSULE ORAL at 14:50

## 2024-01-12 RX ADMIN — MAGNESIUM SULFATE HEPTAHYDRATE 4 G: 4 INJECTION, SOLUTION INTRAVENOUS at 08:45

## 2024-01-12 RX ADMIN — OXYCODONE 5 MG: 5 TABLET ORAL at 12:07

## 2024-01-12 RX ADMIN — ATORVASTATIN CALCIUM 20 MG: 20 TABLET, FILM COATED ORAL at 21:03

## 2024-01-12 RX ADMIN — FUROSEMIDE 20 MG: 20 TABLET ORAL at 05:22

## 2024-01-12 RX ADMIN — GABAPENTIN 600 MG: 300 CAPSULE ORAL at 05:22

## 2024-01-12 RX ADMIN — OXYCODONE 5 MG: 5 TABLET ORAL at 05:22

## 2024-01-12 RX ADMIN — LOSARTAN POTASSIUM 100 MG: 50 TABLET, FILM COATED ORAL at 05:22

## 2024-01-12 RX ADMIN — INSULIN HUMAN 2 UNITS: 100 INJECTION, SOLUTION PARENTERAL at 12:13

## 2024-01-12 RX ADMIN — OXYCODONE 5 MG: 5 TABLET ORAL at 21:17

## 2024-01-12 RX ADMIN — DOCUSATE SODIUM 50 MG AND SENNOSIDES 8.6 MG 2 TABLET: 8.6; 5 TABLET, FILM COATED ORAL at 05:22

## 2024-01-12 RX ADMIN — DAPTOMYCIN 700 MG: 500 INJECTION, POWDER, LYOPHILIZED, FOR SOLUTION INTRAVENOUS at 14:55

## 2024-01-12 RX ADMIN — LACTULOSE 15 ML: 10 SOLUTION ORAL; RECTAL at 05:22

## 2024-01-12 RX ADMIN — DOCUSATE SODIUM 50 MG AND SENNOSIDES 8.6 MG 2 TABLET: 8.6; 5 TABLET, FILM COATED ORAL at 17:39

## 2024-01-12 ASSESSMENT — COGNITIVE AND FUNCTIONAL STATUS - GENERAL
MOBILITY SCORE: 23
SUGGESTED CMS G CODE MODIFIER MOBILITY: CI
CLIMB 3 TO 5 STEPS WITH RAILING: A LITTLE

## 2024-01-12 ASSESSMENT — GAIT ASSESSMENTS
ASSISTIVE DEVICE: FRONT WHEEL WALKER
DEVIATION: DECREASED BASE OF SUPPORT
DISTANCE (FEET): 20
GAIT LEVEL OF ASSIST: STANDBY ASSIST

## 2024-01-12 ASSESSMENT — ENCOUNTER SYMPTOMS
MYALGIAS: 1
NAUSEA: 0
CHILLS: 0
VOMITING: 0
ABDOMINAL PAIN: 0
WEAKNESS: 1
FEVER: 0

## 2024-01-12 ASSESSMENT — PAIN DESCRIPTION - PAIN TYPE
TYPE: ACUTE PAIN

## 2024-01-12 NOTE — THERAPY
Occupational Therapy Contact Note    Patient Name: April Alicia  Age:  63 y.o., Sex:  female  Medical Record #: [id number]  Today's Date: 1/12/2024 01/12/24 3148   Initial Contact Note    Initial Contact Note Order Received and Verified, Occupational Therapy Evaluation in Progress with Full Report to Follow.   Interdisciplinary Plan of Care Collaboration   Collaboration Comments OT orders received. Pt is scheduled to go to OR on Tuesday 1/16 for LLE amputation. Will hold eval until post-op to establish OT POC

## 2024-01-12 NOTE — PROGRESS NOTES
"Hospital Medicine Daily Progress Note    Date of Service  1/12/2024    Chief Complaint  April Alicia is a 63 y.o. female admitted 1/11/2024 with recurrent admissions for chronically infected left knee arthroplasty who has failed multiple antibiotic spacers and multiple prolonged courses of IV antibiotics who was discharged from our hospital to [facility] on January 9, 2024 with no PICC line and possible IV daptomycin.  Patient returns from skilled nursing facility after being there 1 day with fevers and confusion.  Upon readmission from the ER during this hospital admission she was found to be COVID-positive but not hypoxic.  Her confusion is slightly improved and she has been admitted to the general medical floor and deferred surgery until January 16, 2024, which is her original surgical date.  She has been placed back on daptomycin and blood cultures which have been repeated are pending.  Per PT she is ambulating somewhat decently well but will remain in the hospital to her surgery will be done on Tuesday, January 16.    Hospital Course  See above    Interval Problem Update  1/12  Patient is mildly confused. She is adamant about not wanting to have her amputation surgery done today. She does not feel \"well\" in general. Remains afebrile. No other new issues.     I have discussed this patient's plan of care and discharge plan at IDT rounds today with Case Management, Nursing, Nursing leadership, and other members of the IDT team.    Consultants/Specialty  ID  Orthopedics    Code Status  DNAR/DNI    Disposition  The patient is not medically cleared for discharge to home or a post-acute facility.  Anticipate discharge to: skilled nursing facility    I have placed the appropriate orders for post-discharge needs.    Review of Systems  Review of Systems   Constitutional:  Positive for malaise/fatigue. Negative for chills and fever.   Gastrointestinal:  Negative for abdominal pain, nausea and vomiting. "   Musculoskeletal:  Positive for joint pain and myalgias.   Neurological:  Positive for weakness.        Physical Exam  Temp:  [36.3 °C (97.3 °F)-36.7 °C (98.1 °F)] 36.4 °C (97.6 °F)  Pulse:  [61-80] 80  Resp:  [16-18] 16  BP: (107-140)/(71-83) 107/82  SpO2:  [94 %-98 %] 98 %    Physical Exam  Vitals and nursing note reviewed.   Constitutional:       General: She is not in acute distress.     Appearance: She is well-developed.      Comments: Chronically ill appearing  Sitting upright at the edge of bed   HENT:      Head: Normocephalic and atraumatic.      Mouth/Throat:      Pharynx: No oropharyngeal exudate.   Eyes:      General: No scleral icterus.     Pupils: Pupils are equal, round, and reactive to light.   Neck:      Thyroid: No thyromegaly.   Cardiovascular:      Rate and Rhythm: Normal rate and regular rhythm.      Heart sounds: Normal heart sounds. No murmur heard.  Pulmonary:      Effort: Pulmonary effort is normal. No respiratory distress.      Breath sounds: Normal breath sounds. No wheezing.   Abdominal:      General: Bowel sounds are normal. There is no distension.      Palpations: Abdomen is soft.      Tenderness: There is no abdominal tenderness.   Musculoskeletal:         General: Swelling and tenderness present. Normal range of motion.      Cervical back: Normal range of motion and neck supple.      Left lower leg: Edema present.      Comments: R BKA stump appears C/D/I  L knee with swelling and erythema, limited ROM and surgical scar which is well healed   Skin:     General: Skin is warm and dry.      Findings: No rash.   Neurological:      Mental Status: She is alert and oriented to person, place, and time.      Cranial Nerves: No cranial nerve deficit.         Fluids    Intake/Output Summary (Last 24 hours) at 1/12/2024 1432  Last data filed at 1/12/2024 0100  Gross per 24 hour   Intake 530 ml   Output 200 ml   Net 330 ml       Laboratory  Recent Labs     01/11/24  1005 01/12/24  0558   WBC 5.8  4.4*   RBC 4.28 3.98*   HEMOGLOBIN 11.8* 10.5*   HEMATOCRIT 35.9* 32.9*   MCV 83.9 82.7   MCH 27.6 26.4*   MCHC 32.9 31.9*   RDW 49.5 48.5   PLATELETCT 116* 89*   MPV 10.8 9.8     Recent Labs     01/11/24  1005 01/12/24  0558   SODIUM 136 137   POTASSIUM 4.5 4.2   CHLORIDE 108 112   CO2 19* 18*   GLUCOSE 200* 124*   BUN 23* 22   CREATININE 0.75 0.51   CALCIUM 7.8* 7.4*     Recent Labs     01/11/24  1251   INR 1.43*               Imaging  US-EXTREMITY VENOUS LOWER UNILAT LEFT   Final Result      DX-CHEST-PORTABLE (1 VIEW)   Final Result      Cardiomegaly and mild interstitial opacity/edema.      No significant consolidation or pleural effusion.           Assessment/Plan  * Septic arthritis (HCC)- (present on admission)  Assessment & Plan  Patient is being followed by Ortho before and planning for surgery as outpatient  Patient came with fever  Ortho was consulted and planning for OR and I&D during this admission  Continue daptomycin  Surgery to be performed 1/16  Keep in house  US LE venous duplex negative for DVT  WBC is normal, afebrile  Personally reviewed this lab on 1/12    Anemia- (present on admission)  Assessment & Plan  AOCD  Near her baseline  Follow    ACP (advance care planning)- (present on admission)  Assessment & Plan  Confirmed DNR/DNI    COVID-19- (present on admission)  Assessment & Plan  No respiratory failure  Patient is on room air  HDS, no changes noted on 1/12, supportive care    Sepsis (HCC)- (present on admission)  Assessment & Plan  Resolving  See elsewhere  Judicious Fluids given acute COVID    Sleep apnea- (present on admission)  Assessment & Plan  CPAP at night    Type 2 diabetes mellitus with diabetic neuropathy, unspecified (HCC)- (present on admission)  Assessment & Plan  A1c 5.7  Continue ISS, consider stopping soon if BS's remain ok  Personally reviewed above labs on 1/12      Fever- (present on admission)  Assessment & Plan  Likely related to covid, but high risk for  bacteremia  Defer PICC line until blood cultures result (may not need further antibiotics depending on level of amputation)  NO decadron at this time for covid    Metabolic encephalopathy- (present on admission)  Assessment & Plan  Likely related to fever and sepsis   Clinically improving 1/12, nearer her baseline        Paroxysmal atrial fibrillation, hx of- (present on admission)  Assessment & Plan  No RVR  Continue metoprolol and losartan  Patient is not on anticoagulation before    Hypertension- (present on admission)  Assessment & Plan  Well controlled  Continue lasix 20 mg daily, losartan 100 mg daily, toprol XL 25 mg daily    Thrombocytopenia (HCC)- (present on admission)  Assessment & Plan  Mild, trend         VTE prophylaxis:    enoxaparin ppx      I have performed a physical exam and reviewed and updated ROS and Plan today (1/12/2024). In review of yesterday's note (1/11/2024), there are no changes except as documented above.

## 2024-01-12 NOTE — PROGRESS NOTES
Brief Ortho Update:  - Spoke with April this am who wishes to still wait until Tuesday for her next procedure with Dr. Gray  - She also reportedly at already today and is no longer NPO for possible surgery today  - Make NPO Monday at [place]

## 2024-01-12 NOTE — PROGRESS NOTES
4 Eyes Skin Assessment Completed by DANYEL Narvaez and DANYEL Green.    Head WDL  Ears WDL  Nose WDL  Mouth WDL  Neck WDL  Breast/Chest WDL  Shoulder Blades WDL  Spine WDL  (R) Arm/Elbow/Hand WDL  (L) Arm/Elbow/Hand WDL  Abdomen WDL  Groin WDL  Scrotum/Coccyx/Buttocks Redness and Blanching  (R) Leg Scar and Edema  (L) Leg Scar and Edema  (R) Heel/Foot/Toe WDL  (L) Heel/Foot/Toe WDL          Devices In Places Blood Pressure Cuff      Interventions In Place Heels Loaded W/Pillows    Possible Skin Injury No    Pictures Uploaded Into Epic N/A  Wound Consult Placed N/A  RN Wound Prevention Protocol Ordered No

## 2024-01-12 NOTE — DIETARY
"Nutrition services: Day 1 of admit.  April Alicia is a 63 y.o. female with admitting DX of sepsis.     Consult received for unintentional weight loss of 14-23 lbs in the last month (MST 3). Pt in enhanced droplet isolation d/t COVID diagnosis. RD unable to visit pt at bedside.       Assessment:  Height: 172.7 cm (5' 8\")  Weight: 85.3 kg (188 lb) via other healthcare provider   Body mass index is 28.59 kg/m²., BMI classification: overweight   Diet/Intake: NPO, previously on a diabetic diet with % x1 recorded meal     Evaluation:   Pt with reported weight loss. Per chart pt's admit weight is via other healthcare provider. RD asked RN via voalte for updated weight. If current weight accurate pt's weight trending up over the last year. No acute weight loss concerns at this time.   1/11/2024: 188 lbs   10/5/2023: 188 lbs   5/16/2023: 181 lbs   4/22/2023: 168 lbs   1/23/2023: 168 lbs   Current clinical picture and MD progress notes reviewed. Pt admitted for PICC line placement for abx. Recently admitted and discharged for septic arthritis of left knee. Per surgery note plan for left transfemoral amputation. COVID+  Labs (1/12) Glu 124 (H), Ca 7.4 (L), Mag 1.2 (L)  Meds: lasix, gabapentin, lactulose, mag sulfate, oxycodone, senna  Skin: 2+ dependent edema to right lower extremity and 3+ edema to left lower extremity   +BM 1/10    Malnutrition Risk: Pt does not meet criteria per ASPEN guidelines at this time     Recommendations/Plan:  Diet advancements as medically feasible per MD    Encourage intake of all meals >50% once diet advanced.   Monitor weight.    RD to monitor per department policy         "

## 2024-01-12 NOTE — CARE PLAN
The patient is Stable - Low risk of patient condition declining or worsening    Shift Goals  Clinical Goals: IV abx, monitor electrolytes  Patient Goals: to be able to eat  Family Goals: gisele    Progress made toward(s) clinical / shift goals:  Patient updated on and agreeable to plan of care. IV abx and mag administered as documented in the MAR. Pt refused surgery and started on a diabetic diet.     Patient is not progressing towards the following goals:

## 2024-01-12 NOTE — CARE PLAN
Problem: Knowledge Deficit - Standard  Goal: Patient and family/care givers will demonstrate understanding of plan of care, disease process/condition, diagnostic tests and medications  Outcome: Progressing     Problem: Hemodynamics  Goal: Patient's hemodynamics, fluid balance and neurologic status will be stable or improve  Outcome: Progressing     Problem: Fluid Volume  Goal: Fluid volume balance will be maintained  Outcome: Progressing     Problem: Urinary - Renal Perfusion  Goal: Ability to achieve and maintain adequate renal perfusion and functioning will improve  Outcome: Progressing     Problem: Respiratory  Goal: Patient will achieve/maintain optimum respiratory ventilation and gas exchange  Outcome: Progressing     Problem: Mechanical Ventilation  Goal: Safe management of artificial airway and ventilation  Outcome: Progressing  Goal: Successful weaning off mechanical ventilator, spontaneously maintains adequate gas exchange  Outcome: Progressing  Goal: Patient will be able to express needs and understand communication  Outcome: Progressing     Problem: Physical Regulation  Goal: Diagnostic test results will improve  Outcome: Progressing  Goal: Signs and symptoms of infection will decrease  Outcome: Progressing     Problem: Skin Integrity  Goal: Skin integrity is maintained or improved  Outcome: Progressing     Problem: Pain - Standard  Goal: Alleviation of pain or a reduction in pain to the patient’s comfort goal  Outcome: Progressing   The patient is Stable - Low risk of patient condition declining or worsening    Shift Goals  Clinical Goals: IV ABX/pain management  Patient Goals: eat/comfort  Family Goals: gisele    Progress made toward(s) clinical / shift goals:  pt progressing toward goals    Patient is not progressing towards the following goals:

## 2024-01-12 NOTE — CONSULTS
DATE OF SERVICE:  01/11/2024     ORTHOPEDIC CONSULTATION     REQUESTING PHYSICIAN:  Tyrell Garcia MD, emergency department.     REASON FOR CONSULTATION:  Transferred from SNF for fever.     CHIEF COMPLAINT:  No specific complaint.     HISTORY OF PRESENT ILLNESS:  The patient is 63 years old.  She has a complex   history of infected left total knee arthroplasty prosthesis with multiple   revision surgeries including multiple exchange antibiotic prosthesis and IV   antibiotic therapy.  I actually evaluated her in clinic yesterday after she   had been admitted and transferred to a skilled nursing facility as she was   referred to me for consideration of definitive management of her chronic   infected knee arthroplasty with above knee amputation.  We had planned to   schedule tentatively next week, but she was readmitted from her skilled   nursing facility today with fever and lack of IV access to administer her IV   daptomycin.  I was consulted by Dr. Garcia to help provide further   orthopedic treatment recommendations.     PAST MEDICAL HISTORY:  ALLERGIES:  No known drug allergies.     OUTPATIENT MEDICATIONS:  Calcium carbonate, lactulose, gabapentin, oxycodone,   Tylenol, metoprolol XL, potassium chloride, Lasix, Lipitor, Spiriva, Protonix,   ferrous sulfate, losartan, and daptomycin.     PAST MEDICAL DIAGNOSES:  Include COPD, alcoholic cirrhosis, atrial   fibrillation, hepatitis C, history of rheumatic fever, mitral regurgitation,   pancytopenia, seizure disorder, septic arthritis of left shoulder, septic   arthritis of left knee, prosthetic infection of left knee, arthroplasty, and   type 2 diabetes.     PAST SURGICAL HISTORY:  Tubal ligation, hysteroscopy, right knee arthroplasty,   left knee arthroplasty, multiple revision arthroplasties as well as   subsequent antibiotic knee arthroplasty spacer placed on 12/28/2022 by Dr. Luz, and left shoulder arthrotomy and lavage for septic arthritis.     SOCIAL  HISTORY:  The patient quit smoking in 2016.  She does not currently   drink alcohol.  She has history of alcohol dependence and abuse.  She does   have a history of drug use.     PHYSICAL EXAMINATION:  VITAL SIGNS:  Temperature is 102.3, heart rate 85, respiratory rate 20, blood   pressure 121/85, and pulse oximetry 96% on room air.  GENERAL APPEARANCE:  The patient is alert.  She seemed a little bit confused.    She is following commands.  She is a little bit agitated.  MUSCULOSKELETAL:  Left lower extremity, she has a healed midline anterior knee   scar.  She has healed wounds and split-thickness skin graft to the left leg.    She is able to slightly dorsi and plantarflex foot, and flex and extend the   toes.  The knee is swollen.  There are no obvious open wound or significant   induration present.     ASSESSMENT:  A 63-year-old female with multiple medical comorbidities   including a chronically infected left total knee arthroplasty, status post   multiple revision surgeries and antibiotic spacer prosthesis placed twice.    She is transferred from SNF for fever today and reportedly tested positive for   COVID-19.  She is being evaluated for admission to the hospitalist service.     RECOMMENDATIONS:  1.  I discussed findings with the patient and I had met her and her daughter   yesterday in clinic.  We discussed recommendations for left transfemoral   amputation for definitive management of her chronic infected knee   arthroplasty, has been refractory to both antibiotic therapy as well as   surgical management.  2.  Her surgery is currently tentatively scheduled for 1/16/2024, but if she   is being hospitalized and is otherwise felt to be medically optimized then we   could perform transfemoral amputation for her likely tomorrow.  3.  I ultimately deferred to the hospitalist service as to whether or not she   is medically optimized, I can help facilitate expedited surgery or we can keep   it scheduled for  1/16/2023 if that is otherwise felt to be more appropriate.        ______________________________  MD MELBA Brady/SERGIO    DD:  01/11/2024 13:29  DT:  01/11/2024 17:29    Job#:  [id number]

## 2024-01-12 NOTE — PROGRESS NOTES
Received report from off-going nurse.   Assumed pt care at change of shift.   Assessment completed.   Pt is A&Ox 4, vital signs are stable on room air.   No apparent signs of discomfort.   Bed is in low and locked position, call light and belongings in reach, reminded to use call light, bed alarm refused.   No needs at this time. Patient ambulated to bathroom with physical therapist, successful BM.   Patient expresses that she dos not want surgery. Dr. Bird notified and at the bedside with pt.

## 2024-01-12 NOTE — THERAPY
Physical Therapy   Initial Evaluation     Patient Name: April Alicia  Age:  63 y.o., Sex:  female  Medical Record #: [id number]  Today's Date: 1/12/2024     Precautions  Precautions: Fall Risk    Assessment  Patient is 63 y.o. female with diagnosis of sepsis, with complex history of infected left total knee arthroplasty prosthesis with multiple revision surgeries including multiple exchange antibiotic prosthesis and IV antibiotic therapy. .  Per chart, pt needs IV abx with possible surgery pending full plan. Additional factors influencing patient status / progress:  Today, pt moves well, supervised for OOB, stand by assist for ambulation to toilet using FWW. Pt asks to be allowed to do on her own without help. Pt did need help to don socks before ambulation. Per chart, pt lives with friend who works daytime, helps at night. Daughter is a CNA at [employer], very supportive. See details below, PT to follow to trial longer distance ambulation.       Plan    Physical Therapy Initial Treatment Plan   Treatment Plan : Therapeutic Activities, Gait Training, Bed Mobility  Treatment Frequency: 3 Times per Week  Duration: Until Therapy Goals Met    DC Equipment Recommendations: None  Discharge Recommendations: Recommend post-acute placement for additional physical therapy services prior to discharge home (vs home with HH and family assist depending on options (COVID may impact))          Objective       01/12/24 0838   Charge Group   PT Evaluation PT Evaluation High   Total Time Spent   PT Total Time Yes   PT Evaluation Time Spent (Mins) 30   PT Total Time Spent (Calculated) 30   Initial Contact Note    Initial Contact Note Order Received and Verified, Physical Therapy Evaluation in Progress with Full Report to Follow.   Precautions   Precautions Fall Risk   Vitals   O2 Delivery Device None - Room Air   Pain 0 - 10 Group   Therapist Pain Assessment During Activity;Nurse Notified   Prior Living Situation   Prior Services  Intermittent Physical Support for ADL Per Family   Housing / Facility 1 Story Apartment / Condo   Steps Into Home 0  (ramp)   Steps In Home 0   Equipment Owned Front-Wheel Walker;Wheelchair;4-Wheel Walker;Single Point Cane   Lives with - Patient's Self Care Capacity Unrelated Adult  (friend who works daytime. Can help in pm)   Comments pt no longer lives with dghtr who is a CNA at [employer]. Dghtr helps as needed when not working. Pt is up on her own at home, showers on her own. Daughter does grocery shop, laundry.   Prior Level of Functional Mobility   Bed Mobility Independent   Transfer Status Independent   Ambulation Independent   Ambulation Distance household   Assistive Devices Used 4-Wheel Walker   Stairs Required Assist  (family pushes for community distances.)   Cognition    Cognition / Consciousness X   Speech/ Communication Hard of Hearing   Active ROM Lower Body    Active ROM Lower Body  X   Comments L knee flexion limited by swelling, pain but functional for transfers, ambulation as pt maintains shallow flexion with transfers.   Strength Lower Body   Comments R LE wfl, L LE shows no buckling with standing and walking.   Balance Assessment   Sitting Balance (Static) Fair   Sitting Balance (Dynamic) Fair   Standing Balance (Static) Fair   Standing Balance (Dynamic) Fair   Weight Shift Sitting Good   Weight Shift Standing Good   Comments with FWW   Bed Mobility    Supine to Sit Supervised   Sit to Supine Supervised   Scooting Supervised   Rolling Supervised   Comments pt asks to do this on her own, needs no assist   Gait Analysis   Gait Level Of Assist Standby Assist   Assistive Device Front Wheel Walker   Distance (Feet) 20   # of Times Distance was Traveled 1   Deviation Decreased Base Of Support  (favors L LE, functional gait with no buckling.)   Weight Bearing Status no restrictions   Functional Mobility   Sit to Stand Standby Assist   Bed, Chair, Wheelchair Transfer   (pt reports that she wants to stay up  at EOB, not in chair.)   Toilet Transfers Standby Assist  (pt prefers to use door frame to pull herself up as she does at home, no LOB.)   Transfer Method Stand Step   How much difficulty does the patient currently have...   Turning over in bed (including adjusting bedclothes, sheets and blankets)? 4   Sitting down on and standing up from a chair with arms (e.g., wheelchair, bedside commode, etc.) 4   Moving from lying on back to sitting on the side of the bed? 4   How much help from another person does the patient currently need...   Moving to and from a bed to a chair (including a wheelchair)? 4   Need to walk in a hospital room? 4   Climbing 3-5 steps with a railing? 3   6 clicks Mobility Score 23   Activity Tolerance   Sitting Edge of Bed 15+   Standing 5+   Short Term Goals    Short Term Goal # 1 Pt will ambulate x 100 feet using FWW with supervision in 6 viists to improve functional indep.   Education Group   Education Provided Role of Physical Therapist   Role of Physical Therapist Patient Response Patient;Acceptance;Explanation;Verbal Demonstration   Physical Therapy Initial Treatment Plan    Treatment Plan  Therapeutic Activities;Gait Training;Bed Mobility   Treatment Frequency 3 Times per Week   Duration Until Therapy Goals Met   Anticipated Discharge Equipment and Recommendations   DC Equipment Recommendations None   Discharge Recommendations Recommend post-acute placement for additional physical therapy services prior to discharge home  (vs home with HH and family assist depending on options (COVID may impact))   Interdisciplinary Plan of Care Collaboration   IDT Collaboration with  Nursing   Patient Position at End of Therapy Seated;Edge of Bed;Call Light within Reach;Tray Table within Reach;Phone within Reach;Family / Friend in Room   Collaboration Comments nsg in room and updated.   Session Information   Date / Session Number  1/12-1 (1/3, 1/18)

## 2024-01-12 NOTE — CONSULTS
Patient was just seen by ID, see prior progress notes by Dr. Vazquez.  She was discharged on daptomycin and then return to the hospital with fever.  Now COVID-positive.  Plan is to go back to the OR on Tuesday for likely amputation.    --- Continue daptomycin   --- Reconsult ID after her procedure on Tuesday for additional recommendations.    Aurea Ramos MD

## 2024-01-12 NOTE — CARE PLAN
The patient is Stable - Low risk of patient condition declining or worsening    Shift Goals  Clinical Goals: IV ABX/pain management  Patient Goals: eat/comfort  Family Goals: gisele    Progress made toward(s) clinical / shift goals:  Pt is Aox4. On RA. On regular diet. Purewick in place. No acute distress noted. Pt remains free of falls.     Patient is not progressing towards the following goals:

## 2024-01-13 LAB
ANION GAP SERPL CALC-SCNC: 11 MMOL/L (ref 7–16)
BACTERIA UR CULT: ABNORMAL
BACTERIA UR CULT: ABNORMAL
BASOPHILS # BLD AUTO: 1.7 % (ref 0–1.8)
BASOPHILS # BLD: 0.08 K/UL (ref 0–0.12)
BUN SERPL-MCNC: 23 MG/DL (ref 8–22)
CALCIUM SERPL-MCNC: 8.1 MG/DL (ref 8.5–10.5)
CHLORIDE SERPL-SCNC: 109 MMOL/L (ref 96–112)
CK SERPL-CCNC: 62 U/L (ref 0–154)
CO2 SERPL-SCNC: 17 MMOL/L (ref 20–33)
CREAT SERPL-MCNC: 0.71 MG/DL (ref 0.5–1.4)
EOSINOPHIL # BLD AUTO: 0.17 K/UL (ref 0–0.51)
EOSINOPHIL NFR BLD: 3.6 % (ref 0–6.9)
ERYTHROCYTE [DISTWIDTH] IN BLOOD BY AUTOMATED COUNT: 47.8 FL (ref 35.9–50)
GFR SERPLBLD CREATININE-BSD FMLA CKD-EPI: 95 ML/MIN/1.73 M 2
GLUCOSE BLD STRIP.AUTO-MCNC: 128 MG/DL (ref 65–99)
GLUCOSE BLD STRIP.AUTO-MCNC: 135 MG/DL (ref 65–99)
GLUCOSE BLD STRIP.AUTO-MCNC: 153 MG/DL (ref 65–99)
GLUCOSE BLD STRIP.AUTO-MCNC: 154 MG/DL (ref 65–99)
GLUCOSE BLD STRIP.AUTO-MCNC: 189 MG/DL (ref 65–99)
GLUCOSE SERPL-MCNC: 129 MG/DL (ref 65–99)
HCT VFR BLD AUTO: 34.8 % (ref 37–47)
HGB BLD-MCNC: 11.7 G/DL (ref 12–16)
IMM GRANULOCYTES # BLD AUTO: 0.04 K/UL (ref 0–0.11)
IMM GRANULOCYTES NFR BLD AUTO: 0.9 % (ref 0–0.9)
LYMPHOCYTES # BLD AUTO: 1.4 K/UL (ref 1–4.8)
LYMPHOCYTES NFR BLD: 29.8 % (ref 22–41)
MAGNESIUM SERPL-MCNC: 1.7 MG/DL (ref 1.5–2.5)
MCH RBC QN AUTO: 27.7 PG (ref 27–33)
MCHC RBC AUTO-ENTMCNC: 33.6 G/DL (ref 32.2–35.5)
MCV RBC AUTO: 82.5 FL (ref 81.4–97.8)
MONOCYTES # BLD AUTO: 0.37 K/UL (ref 0–0.85)
MONOCYTES NFR BLD AUTO: 7.9 % (ref 0–13.4)
NEUTROPHILS # BLD AUTO: 2.64 K/UL (ref 1.82–7.42)
NEUTROPHILS NFR BLD: 56.1 % (ref 44–72)
NRBC # BLD AUTO: 0 K/UL
NRBC BLD-RTO: 0 /100 WBC (ref 0–0.2)
PLATELET # BLD AUTO: 116 K/UL (ref 164–446)
PLATELETS.RETICULATED NFR BLD AUTO: 1.5 % (ref 0.6–13.1)
PMV BLD AUTO: 10.9 FL (ref 9–12.9)
POTASSIUM SERPL-SCNC: 4.2 MMOL/L (ref 3.6–5.5)
RBC # BLD AUTO: 4.22 M/UL (ref 4.2–5.4)
SIGNIFICANT IND 70042: ABNORMAL
SITE SITE: ABNORMAL
SODIUM SERPL-SCNC: 137 MMOL/L (ref 135–145)
SOURCE SOURCE: ABNORMAL
WBC # BLD AUTO: 4.7 K/UL (ref 4.8–10.8)

## 2024-01-13 PROCEDURE — 85055 RETICULATED PLATELET ASSAY: CPT

## 2024-01-13 PROCEDURE — 700102 HCHG RX REV CODE 250 W/ 637 OVERRIDE(OP): Performed by: INTERNAL MEDICINE

## 2024-01-13 PROCEDURE — 36415 COLL VENOUS BLD VENIPUNCTURE: CPT

## 2024-01-13 PROCEDURE — 700111 HCHG RX REV CODE 636 W/ 250 OVERRIDE (IP): Mod: JZ | Performed by: INTERNAL MEDICINE

## 2024-01-13 PROCEDURE — A9270 NON-COVERED ITEM OR SERVICE: HCPCS | Performed by: INTERNAL MEDICINE

## 2024-01-13 PROCEDURE — 85025 COMPLETE CBC W/AUTO DIFF WBC: CPT

## 2024-01-13 PROCEDURE — 700105 HCHG RX REV CODE 258: Performed by: INTERNAL MEDICINE

## 2024-01-13 PROCEDURE — 83735 ASSAY OF MAGNESIUM: CPT

## 2024-01-13 PROCEDURE — 80048 BASIC METABOLIC PNL TOTAL CA: CPT

## 2024-01-13 PROCEDURE — 82962 GLUCOSE BLOOD TEST: CPT | Mod: 91

## 2024-01-13 PROCEDURE — 82550 ASSAY OF CK (CPK): CPT

## 2024-01-13 PROCEDURE — 99232 SBSQ HOSP IP/OBS MODERATE 35: CPT | Performed by: INTERNAL MEDICINE

## 2024-01-13 PROCEDURE — 770001 HCHG ROOM/CARE - MED/SURG/GYN PRIV*

## 2024-01-13 RX ADMIN — ACETAMINOPHEN 650 MG: 325 TABLET, FILM COATED ORAL at 05:50

## 2024-01-13 RX ADMIN — INSULIN HUMAN 1 UNITS: 100 INJECTION, SOLUTION PARENTERAL at 12:22

## 2024-01-13 RX ADMIN — ENOXAPARIN SODIUM 40 MG: 100 INJECTION SUBCUTANEOUS at 18:29

## 2024-01-13 RX ADMIN — OXYCODONE 5 MG: 5 TABLET ORAL at 05:50

## 2024-01-13 RX ADMIN — DOCUSATE SODIUM 50 MG AND SENNOSIDES 8.6 MG 2 TABLET: 8.6; 5 TABLET, FILM COATED ORAL at 05:41

## 2024-01-13 RX ADMIN — TIOTROPIUM BROMIDE INHALATION SPRAY 5 MCG: 3.12 SPRAY, METERED RESPIRATORY (INHALATION) at 09:17

## 2024-01-13 RX ADMIN — DAPTOMYCIN 700 MG: 500 INJECTION, POWDER, LYOPHILIZED, FOR SOLUTION INTRAVENOUS at 18:30

## 2024-01-13 RX ADMIN — FUROSEMIDE 20 MG: 20 TABLET ORAL at 05:42

## 2024-01-13 RX ADMIN — INSULIN HUMAN 1 UNITS: 100 INJECTION, SOLUTION PARENTERAL at 09:16

## 2024-01-13 RX ADMIN — DOCUSATE SODIUM 50 MG AND SENNOSIDES 8.6 MG 2 TABLET: 8.6; 5 TABLET, FILM COATED ORAL at 21:17

## 2024-01-13 RX ADMIN — LACTULOSE 15 ML: 10 SOLUTION ORAL; RECTAL at 05:41

## 2024-01-13 RX ADMIN — OXYCODONE 5 MG: 5 TABLET ORAL at 21:39

## 2024-01-13 RX ADMIN — OMEPRAZOLE 20 MG: 20 CAPSULE, DELAYED RELEASE ORAL at 05:41

## 2024-01-13 RX ADMIN — GABAPENTIN 600 MG: 300 CAPSULE ORAL at 21:18

## 2024-01-13 RX ADMIN — ATORVASTATIN CALCIUM 20 MG: 20 TABLET, FILM COATED ORAL at 21:18

## 2024-01-13 RX ADMIN — OXYCODONE 5 MG: 5 TABLET ORAL at 12:27

## 2024-01-13 RX ADMIN — LOSARTAN POTASSIUM 100 MG: 50 TABLET, FILM COATED ORAL at 05:42

## 2024-01-13 RX ADMIN — METOPROLOL SUCCINATE 25 MG: 25 TABLET, EXTENDED RELEASE ORAL at 21:22

## 2024-01-13 RX ADMIN — GABAPENTIN 600 MG: 300 CAPSULE ORAL at 05:41

## 2024-01-13 RX ADMIN — GABAPENTIN 600 MG: 300 CAPSULE ORAL at 14:12

## 2024-01-13 RX ADMIN — INSULIN HUMAN 1 UNITS: 100 INJECTION, SOLUTION PARENTERAL at 21:31

## 2024-01-13 ASSESSMENT — ENCOUNTER SYMPTOMS
WEAKNESS: 1
MYALGIAS: 1
ABDOMINAL PAIN: 0
NAUSEA: 0
FEVER: 0
NERVOUS/ANXIOUS: 1
VOMITING: 0

## 2024-01-13 ASSESSMENT — PAIN DESCRIPTION - PAIN TYPE
TYPE: ACUTE PAIN

## 2024-01-13 NOTE — PROGRESS NOTES
Received bedside report and accepted care of patient.  Patient currently resting in bed in no visible or stated distress.  Bed controls on and bed in locked position.   Call light and personal possessions within reach.  Plan of care to include pain management, assistance with ADL's, IV antibiotic therapy, ACHS blood sugar monitoring and treatment and continued hospital care until upcoming surgery on the 16th.  Patient verbalizes agreement with plan of care, and has no additional questions or concerns at this time.  Will continue to update notes/plan of care as needed throughout shift.

## 2024-01-13 NOTE — CARE PLAN
Problem: Skin Integrity  Goal: Skin integrity is maintained or improved  Outcome: Progressing     Problem: Pain - Standard  Goal: Alleviation of pain or a reduction in pain to the patient’s comfort goal  Outcome: Progressing     The patient is Watcher - Medium risk of patient condition declining or worsening    Shift Goals  Clinical Goals: IV Abx, pain management this shift  Patient Goals: rest, pain management  Family Goals: EDMUND    Progress made toward(s) clinical / shift goals:  Pt. Medicated appropriately for pain see MAR.  Catiting SOULEYMANE MORA on 1/16.  Call light and belongings in reach, treaded socks on, bed alarm on, yellow fall risk band on, appropriate fall risk sign on door.

## 2024-01-13 NOTE — PROGRESS NOTES
"Hospital Medicine Daily Progress Note    Date of Service  1/13/2024    Chief Complaint  April Alicia is a 63 y.o. female admitted 1/11/2024 with recurrent admissions for chronically infected left knee arthroplasty who has failed multiple antibiotic spacers and multiple prolonged courses of IV antibiotics who was discharged from our hospital to [facility] on January 9, 2024 with no PICC line and possible IV daptomycin.  Patient returns from skilled nursing facility after being there 1 day with fevers and confusion.  Upon readmission from the ER during this hospital admission she was found to be COVID-positive but not hypoxic.  Her confusion is slightly improved and she has been admitted to the general medical floor and deferred surgery until January 16, 2024, which is her original surgical date.  She has been placed back on daptomycin and blood cultures which have been repeated are pending.  Per PT she is ambulating somewhat decently well but will remain in the hospital to her surgery will be done on Tuesday, January 16.    Hospital Course  See above    Interval Problem Update  1/12  Patient is mildly confused. She is adamant about not wanting to have her amputation surgery done today. She does not feel \"well\" in general. Remains afebrile. No other new issues.     1/13  Patient is tearful this AM since she is scared about getting another surgery next week. She knows she needs the surgery since she keeps getting infections in the L knee and is tired of the hospital visits and pain. Current pain relief is being achieved with oxycodone. Her malaise is a bit better today, remains afebrile and not requiring oxygen.     I have discussed this patient's plan of care and discharge plan at IDT rounds today with Case Management, Nursing, Nursing leadership, and other members of the IDT team.    Consultants/Specialty  ID  Orthopedics    Code Status  DNAR/DNI    Disposition  The patient is not medically cleared for " discharge to home or a post-acute facility.  Anticipate discharge to: home with organized home healthcare and close outpatient follow-up    I have placed the appropriate orders for post-discharge needs.    Review of Systems  Review of Systems   Constitutional:  Positive for malaise/fatigue. Negative for fever.        Some improvement noted 1/13   Gastrointestinal:  Negative for abdominal pain, nausea and vomiting.   Musculoskeletal:  Positive for joint pain and myalgias.        Pain is somewhat tolerable   Neurological:  Positive for weakness.   Psychiatric/Behavioral:  The patient is nervous/anxious.         Physical Exam  Temp:  [36.6 °C (97.8 °F)-36.9 °C (98.4 °F)] 36.8 °C (98.2 °F)  Pulse:  [60-90] 90  Resp:  [16-18] 18  BP: (104-124)/(69-92) 122/92  SpO2:  [97 %-100 %] 99 %    Physical Exam  Vitals and nursing note reviewed.   Constitutional:       General: She is not in acute distress.     Appearance: She is well-developed.      Comments: Chronically ill appearing  Sitting upright at the edge of bed  Very anxious this AM, tearful   HENT:      Head: Normocephalic and atraumatic.      Mouth/Throat:      Pharynx: No oropharyngeal exudate.   Eyes:      General: No scleral icterus.     Pupils: Pupils are equal, round, and reactive to light.   Neck:      Thyroid: No thyromegaly.   Cardiovascular:      Rate and Rhythm: Normal rate and regular rhythm.      Heart sounds: Normal heart sounds. No murmur heard.  Pulmonary:      Effort: Pulmonary effort is normal. No respiratory distress.      Breath sounds: Normal breath sounds. No wheezing.   Abdominal:      General: Bowel sounds are normal. There is no distension.      Palpations: Abdomen is soft.      Tenderness: There is no abdominal tenderness.   Musculoskeletal:         General: Swelling and tenderness present. Normal range of motion.      Cervical back: Normal range of motion and neck supple.      Left lower leg: Edema present.      Comments: R knee surgical scar  is C/D/I  L knee with swelling and erythema, limited ROM and surgical scar which is well healed, TTP appreciated  No other changes noted 1/13   Skin:     General: Skin is warm and dry.      Findings: No rash.   Neurological:      Mental Status: She is alert and oriented to person, place, and time.      Cranial Nerves: No cranial nerve deficit.         Fluids    Intake/Output Summary (Last 24 hours) at 1/13/2024 1034  Last data filed at 1/13/2024 0400  Gross per 24 hour   Intake 240 ml   Output --   Net 240 ml       Laboratory  Recent Labs     01/11/24  1005 01/12/24  0558   WBC 5.8 4.4*   RBC 4.28 3.98*   HEMOGLOBIN 11.8* 10.5*   HEMATOCRIT 35.9* 32.9*   MCV 83.9 82.7   MCH 27.6 26.4*   MCHC 32.9 31.9*   RDW 49.5 48.5   PLATELETCT 116* 89*   MPV 10.8 9.8     Recent Labs     01/11/24  1005 01/12/24  0558   SODIUM 136 137   POTASSIUM 4.5 4.2   CHLORIDE 108 112   CO2 19* 18*   GLUCOSE 200* 124*   BUN 23* 22   CREATININE 0.75 0.51   CALCIUM 7.8* 7.4*     Recent Labs     01/11/24  1251   INR 1.43*               Imaging  US-EXTREMITY VENOUS LOWER UNILAT LEFT   Final Result      DX-CHEST-PORTABLE (1 VIEW)   Final Result      Cardiomegaly and mild interstitial opacity/edema.      No significant consolidation or pleural effusion.           Assessment/Plan  * Septic arthritis (HCC)- (present on admission)  Assessment & Plan  Patient is being followed by Ortho before and planning for surgery as outpatient  Patient came with fever  Ortho was consulted and planning for OR and I&D during this admission  Continue daptomycin, check CPK today  Surgery to be performed 1/16  Keep in house  US LE venous duplex negative for DVT  WBC is normal, afebrile  No changes noted 1/13    Anemia- (present on admission)  Assessment & Plan  AOCD  Near her baseline  Follow    ACP (advance care planning)- (present on admission)  Assessment & Plan  Confirmed DNR/DNI    COVID-19- (present on admission)  Assessment & Plan  No respiratory failure  Patient  is on room air  HDS, no changes noted on 1/13, supportive care    Sepsis (HCC)- (present on admission)  Assessment & Plan  resolved    Sleep apnea- (present on admission)  Assessment & Plan  CPAP at night    Type 2 diabetes mellitus with diabetic neuropathy, unspecified (HCC)- (present on admission)  Assessment & Plan  A1c 5.7  Continue ISS, consider stopping soon if BS's remain ok  Personally reviewed above labs on 1/13/2024        Fever- (present on admission)  Assessment & Plan  Likely related to covid, but high risk for bacteremia  Defer PICC line until blood cultures result (may not need further antibiotics depending on level of amputation)  NO decadron at this time for covid  None in the last 24 hours    Hypomagnesemia- (present on admission)  Assessment & Plan  Replaced  Check labs 1/14    Metabolic encephalopathy- (present on admission)  Assessment & Plan  Likely related to fever and sepsis   Resolved        Paroxysmal atrial fibrillation, hx of- (present on admission)  Assessment & Plan  No RVR  Continue metoprolol and losartan  Patient is not on anticoagulation before    Hypertension- (present on admission)  Assessment & Plan  Well controlled, no changes planned 1/13/2024  Continue lasix 20 mg daily, losartan 100 mg daily, toprol XL 25 mg daily    Thrombocytopenia (HCC)- (present on admission)  Assessment & Plan  Mild, trend    Diabetic polyneuropathy associated with type 2 diabetes mellitus (HCC)- (present on admission)  Assessment & Plan  Continue gabapentin  No dosing changes planned 1/13/2024           VTE prophylaxis:    enoxaparin ppx      I have performed a physical exam and reviewed and updated ROS and Plan today (1/13/2024). In review of yesterday's note (1/12/2024), there are no changes except as documented above.

## 2024-01-14 LAB
GLUCOSE BLD STRIP.AUTO-MCNC: 107 MG/DL (ref 65–99)
GLUCOSE BLD STRIP.AUTO-MCNC: 121 MG/DL (ref 65–99)
GLUCOSE BLD STRIP.AUTO-MCNC: 196 MG/DL (ref 65–99)

## 2024-01-14 PROCEDURE — A9270 NON-COVERED ITEM OR SERVICE: HCPCS | Performed by: INTERNAL MEDICINE

## 2024-01-14 PROCEDURE — 82962 GLUCOSE BLOOD TEST: CPT

## 2024-01-14 PROCEDURE — 305522 CATH IV 24GA X 3/4": Performed by: ORTHOPAEDIC SURGERY

## 2024-01-14 PROCEDURE — 99232 SBSQ HOSP IP/OBS MODERATE 35: CPT | Performed by: INTERNAL MEDICINE

## 2024-01-14 PROCEDURE — 700111 HCHG RX REV CODE 636 W/ 250 OVERRIDE (IP): Mod: JZ | Performed by: INTERNAL MEDICINE

## 2024-01-14 PROCEDURE — 700102 HCHG RX REV CODE 250 W/ 637 OVERRIDE(OP): Performed by: INTERNAL MEDICINE

## 2024-01-14 PROCEDURE — 770001 HCHG ROOM/CARE - MED/SURG/GYN PRIV*

## 2024-01-14 PROCEDURE — 700105 HCHG RX REV CODE 258: Performed by: INTERNAL MEDICINE

## 2024-01-14 RX ADMIN — METOPROLOL SUCCINATE 25 MG: 25 TABLET, EXTENDED RELEASE ORAL at 20:53

## 2024-01-14 RX ADMIN — LACTULOSE 15 ML: 10 SOLUTION ORAL; RECTAL at 04:47

## 2024-01-14 RX ADMIN — OXYCODONE 5 MG: 5 TABLET ORAL at 04:56

## 2024-01-14 RX ADMIN — DAPTOMYCIN 700 MG: 500 INJECTION, POWDER, LYOPHILIZED, FOR SOLUTION INTRAVENOUS at 14:49

## 2024-01-14 RX ADMIN — INSULIN HUMAN 1 UNITS: 100 INJECTION, SOLUTION PARENTERAL at 20:59

## 2024-01-14 RX ADMIN — GABAPENTIN 600 MG: 300 CAPSULE ORAL at 04:48

## 2024-01-14 RX ADMIN — FUROSEMIDE 20 MG: 20 TABLET ORAL at 04:50

## 2024-01-14 RX ADMIN — TIOTROPIUM BROMIDE INHALATION SPRAY 5 MCG: 3.12 SPRAY, METERED RESPIRATORY (INHALATION) at 09:24

## 2024-01-14 RX ADMIN — OMEPRAZOLE 20 MG: 20 CAPSULE, DELAYED RELEASE ORAL at 04:50

## 2024-01-14 RX ADMIN — GABAPENTIN 600 MG: 300 CAPSULE ORAL at 20:54

## 2024-01-14 RX ADMIN — LOSARTAN POTASSIUM 100 MG: 50 TABLET, FILM COATED ORAL at 04:50

## 2024-01-14 RX ADMIN — ATORVASTATIN CALCIUM 20 MG: 20 TABLET, FILM COATED ORAL at 20:53

## 2024-01-14 ASSESSMENT — PAIN DESCRIPTION - PAIN TYPE
TYPE: ACUTE PAIN

## 2024-01-14 ASSESSMENT — ENCOUNTER SYMPTOMS
NERVOUS/ANXIOUS: 1
ABDOMINAL PAIN: 0
WEAKNESS: 1
MYALGIAS: 1
FEVER: 0
NAUSEA: 0
VOMITING: 0

## 2024-01-14 NOTE — CARE PLAN
The patient is Stable - Low risk of patient condition declining or worsening    Shift Goals  Clinical Goals: Pain mgnmt, maintain skin integrity, obtain IV access for IV abx tomorrw, rest  Patient Goals: Rest, pain relief, comfort  Family Goals: EDMUND    Progress made toward(s) clinical / shift goals:  Pt medicated for pain per MAR w/ relief, observed pt turn self in bed and encouraging/reminding of turns throughout night. Order placed for VAT team consult to obtain IV access.     Problem: Knowledge Deficit - Standard  Goal: Patient and family/care givers will demonstrate understanding of plan of care, disease process/condition, diagnostic tests and medications  1/14/2024 0116 by Maday Pedroza R.N.  Outcome: Progressing  1/14/2024 0116 by Maday Pedroza R.N.  Outcome: Progressing     Problem: Skin Integrity  Goal: Skin integrity is maintained or improved  1/14/2024 0116 by Maday Pedroza R.N.  Outcome: Progressing  1/14/2024 0116 by Maday Pedroza R.N.  Outcome: Progressing     Problem: Pain - Standard  Goal: Alleviation of pain or a reduction in pain to the patient’s comfort goal  1/14/2024 0116 by VENKATA TinocoN.  Outcome: Progressing  1/14/2024 0116 by Maday Pedroza R.N.  Outcome: Progressing     Problem: Fall Risk  Goal: Patient will remain free from falls  1/14/2024 0116 by Maday Pedroza R.N.  Outcome: Progressing  1/14/2024 0116 by Maday Pedroza R.N.  Outcome: Progressing

## 2024-01-14 NOTE — PROGRESS NOTES
Assumed care of pt. All medications taken and tolerated, medicated for pain per MAR. COVID isolation precautions in place. Pt resting in bed w/ call light and belongings in reach, bed alarm on, hourly rounding in place. No additional needs at this time.

## 2024-01-14 NOTE — PROGRESS NOTES
"Hospital Medicine Daily Progress Note    Date of Service  1/14/2024    Chief Complaint  April Alicia is a 63 y.o. female admitted 1/11/2024 with recurrent admissions for chronically infected left knee arthroplasty who has failed multiple antibiotic spacers and multiple prolonged courses of IV antibiotics who was discharged from our hospital to [facility] on January 9, 2024 with no PICC line and possible IV daptomycin.  Patient returns from skilled nursing facility after being there 1 day with fevers and confusion.  Upon readmission from the ER during this hospital admission she was found to be COVID-positive but not hypoxic.  Her confusion is slightly improved and she has been admitted to the general medical floor and deferred surgery until January 16, 2024, which is her original surgical date.  She has been placed back on daptomycin and blood cultures which have been repeated are pending.  Per PT she is ambulating somewhat decently well but will remain in the hospital to her surgery will be done on Tuesday, January 16.    Hospital Course  See above    Interval Problem Update  1/12  Patient is mildly confused. She is adamant about not wanting to have her amputation surgery done today. She does not feel \"well\" in general. Remains afebrile. No other new issues.     1/13  Patient is tearful this AM since she is scared about getting another surgery next week. She knows she needs the surgery since she keeps getting infections in the L knee and is tired of the hospital visits and pain. Current pain relief is being achieved with oxycodone. Her malaise is a bit better today, remains afebrile and not requiring oxygen.    1/14  Patient appears comfortable. No acute issues overnight.      I have discussed this patient's plan of care and discharge plan at IDT rounds today with Case Management, Nursing, Nursing leadership, and other members of the IDT team.    Consultants/Specialty  ID  Orthopedics    Code " Status  DNAR/DNI    Disposition  The patient is not medically cleared for discharge to home or a post-acute facility.  Anticipate discharge to: skilled nursing facility    I have placed the appropriate orders for post-discharge needs.    Review of Systems  Review of Systems   Constitutional:  Negative for fever and malaise/fatigue.        Near her baseline now, 1/14   Gastrointestinal:  Negative for abdominal pain, nausea and vomiting.   Musculoskeletal:  Positive for joint pain and myalgias.        Pain is tolerable   Neurological:  Positive for weakness.   Psychiatric/Behavioral:  The patient is nervous/anxious.         Physical Exam  Temp:  [36.7 °C (98 °F)-36.8 °C (98.2 °F)] 36.8 °C (98.2 °F)  Pulse:  [59-77] 67  Resp:  [17-18] 17  BP: (116-145)/(73-89) 145/78  SpO2:  [93 %-99 %] 93 %    Physical Exam  Vitals and nursing note reviewed.   Constitutional:       General: She is not in acute distress.     Appearance: She is well-developed.      Comments: Much calmer appearing   HENT:      Head: Normocephalic and atraumatic.      Mouth/Throat:      Pharynx: No oropharyngeal exudate.   Eyes:      General: No scleral icterus.     Pupils: Pupils are equal, round, and reactive to light.   Neck:      Thyroid: No thyromegaly.   Cardiovascular:      Rate and Rhythm: Normal rate and regular rhythm.      Heart sounds: Normal heart sounds. No murmur heard.  Pulmonary:      Effort: Pulmonary effort is normal. No respiratory distress.      Breath sounds: Normal breath sounds. No wheezing.   Abdominal:      General: Bowel sounds are normal. There is no distension.      Palpations: Abdomen is soft.      Tenderness: There is no abdominal tenderness.   Musculoskeletal:         General: Swelling and tenderness present. Normal range of motion.      Cervical back: Normal range of motion and neck supple.      Left lower leg: Edema present.      Comments: R knee surgical scar is C/D/I  L knee with swelling and erythema, limited ROM and  surgical scar which is well healed, TTP appreciated  No other changes noted 1/14   Skin:     General: Skin is warm and dry.      Findings: No rash.   Neurological:      Mental Status: She is alert and oriented to person, place, and time.      Cranial Nerves: No cranial nerve deficit.         Fluids    Intake/Output Summary (Last 24 hours) at 1/14/2024 1234  Last data filed at 1/14/2024 0500  Gross per 24 hour   Intake --   Output 1200 ml   Net -1200 ml       Laboratory  Recent Labs     01/12/24  0558 01/13/24  1759   WBC 4.4* 4.7*   RBC 3.98* 4.22   HEMOGLOBIN 10.5* 11.7*   HEMATOCRIT 32.9* 34.8*   MCV 82.7 82.5   MCH 26.4* 27.7   MCHC 31.9* 33.6   RDW 48.5 47.8   PLATELETCT 89* 116*   MPV 9.8 10.9     Recent Labs     01/12/24  0558 01/13/24  1759   SODIUM 137 137   POTASSIUM 4.2 4.2   CHLORIDE 112 109   CO2 18* 17*   GLUCOSE 124* 129*   BUN 22 23*   CREATININE 0.51 0.71   CALCIUM 7.4* 8.1*     Recent Labs     01/11/24  1251   INR 1.43*               Imaging  US-EXTREMITY VENOUS LOWER UNILAT LEFT   Final Result      DX-CHEST-PORTABLE (1 VIEW)   Final Result      Cardiomegaly and mild interstitial opacity/edema.      No significant consolidation or pleural effusion.           Assessment/Plan  * Septic arthritis (HCC)- (present on admission)  Assessment & Plan  Patient is being followed by Ortho before and planning for surgery as outpatient  Patient came with fever  Ortho was consulted and planning for OR and I&D during this admission  Continue daptomycin, CPK is ok, blood cxs ngtd  Surgery to be performed 1/16  Keep in house  US LE venous duplex negative for DVT  WBC is normal, afebrile  No changes noted 1/14    Personally reviewed the CPK and blood cultures 1/14      Anemia- (present on admission)  Assessment & Plan  AOCD  Near her baseline  Follow    ACP (advance care planning)- (present on admission)  Assessment & Plan  Confirmed DNR/DNI    COVID-19- (present on admission)  Assessment & Plan  No respiratory  failure  Patient is on room air  HDS, no changes noted on 1/13, supportive care    Sepsis (HCC)- (present on admission)  Assessment & Plan  resolved    Sleep apnea- (present on admission)  Assessment & Plan  CPAP at night    Type 2 diabetes mellitus with diabetic neuropathy, unspecified (HCC)- (present on admission)  Assessment & Plan  A1c 5.7  Continue ISS, consider stopping soon if BS's remain ok  Personally reviewed above labs on 1/14/2024        Fever- (present on admission)  Assessment & Plan  Likely related to covid, but high risk for bacteremia  Defer PICC line until blood cultures result (may not need further antibiotics depending on level of amputation)  NO decadron at this time for covid  None in the last 24 hours    Hypomagnesemia- (present on admission)  Assessment & Plan  Resolved  Personally reviewed the serum MG++ level 1/14    Metabolic encephalopathy- (present on admission)  Assessment & Plan  Likely related to fever and sepsis   Resolved        Paroxysmal atrial fibrillation, hx of- (present on admission)  Assessment & Plan  No RVR  Continue metoprolol and losartan  Patient is not on anticoagulation before    Hypertension- (present on admission)  Assessment & Plan  Well controlled, no changes planned 1/14/2024  Continue lasix 20 mg daily, losartan 100 mg daily, toprol XL 25 mg daily    Thrombocytopenia (HCC)- (present on admission)  Assessment & Plan  Mild, trend    Diabetic polyneuropathy associated with type 2 diabetes mellitus (HCC)- (present on admission)  Assessment & Plan  Continue gabapentin  No dosing changes planned 1/14/2024           VTE prophylaxis:    enoxaparin ppx      I have performed a physical exam and reviewed and updated ROS and Plan today (1/14/2024). In review of yesterday's note (1/13/2024), there are no changes except as documented above.

## 2024-01-15 ENCOUNTER — APPOINTMENT (OUTPATIENT)
Dept: RADIOLOGY | Facility: MEDICAL CENTER | Age: 64
DRG: 474 | End: 2024-01-15
Attending: INTERNAL MEDICINE
Payer: MEDICAID

## 2024-01-15 LAB
GLUCOSE BLD STRIP.AUTO-MCNC: 102 MG/DL (ref 65–99)
GLUCOSE BLD STRIP.AUTO-MCNC: 149 MG/DL (ref 65–99)
GLUCOSE BLD STRIP.AUTO-MCNC: 167 MG/DL (ref 65–99)
GLUCOSE BLD STRIP.AUTO-MCNC: 82 MG/DL (ref 65–99)

## 2024-01-15 PROCEDURE — 700105 HCHG RX REV CODE 258: Performed by: INTERNAL MEDICINE

## 2024-01-15 PROCEDURE — A9270 NON-COVERED ITEM OR SERVICE: HCPCS | Performed by: INTERNAL MEDICINE

## 2024-01-15 PROCEDURE — 770001 HCHG ROOM/CARE - MED/SURG/GYN PRIV*

## 2024-01-15 PROCEDURE — 700102 HCHG RX REV CODE 250 W/ 637 OVERRIDE(OP): Performed by: INTERNAL MEDICINE

## 2024-01-15 PROCEDURE — 99232 SBSQ HOSP IP/OBS MODERATE 35: CPT | Performed by: INTERNAL MEDICINE

## 2024-01-15 PROCEDURE — 82962 GLUCOSE BLOOD TEST: CPT | Mod: 91

## 2024-01-15 PROCEDURE — 700111 HCHG RX REV CODE 636 W/ 250 OVERRIDE (IP): Mod: JZ | Performed by: INTERNAL MEDICINE

## 2024-01-15 RX ADMIN — GABAPENTIN 600 MG: 300 CAPSULE ORAL at 21:43

## 2024-01-15 RX ADMIN — METOPROLOL SUCCINATE 25 MG: 25 TABLET, EXTENDED RELEASE ORAL at 21:43

## 2024-01-15 RX ADMIN — LACTULOSE 15 ML: 10 SOLUTION ORAL; RECTAL at 04:57

## 2024-01-15 RX ADMIN — OXYCODONE 5 MG: 5 TABLET ORAL at 17:55

## 2024-01-15 RX ADMIN — FUROSEMIDE 20 MG: 20 TABLET ORAL at 04:56

## 2024-01-15 RX ADMIN — DOCUSATE SODIUM 50 MG AND SENNOSIDES 8.6 MG 2 TABLET: 8.6; 5 TABLET, FILM COATED ORAL at 04:56

## 2024-01-15 RX ADMIN — ATORVASTATIN CALCIUM 20 MG: 20 TABLET, FILM COATED ORAL at 21:43

## 2024-01-15 RX ADMIN — OXYCODONE 5 MG: 5 TABLET ORAL at 08:49

## 2024-01-15 RX ADMIN — OMEPRAZOLE 20 MG: 20 CAPSULE, DELAYED RELEASE ORAL at 04:56

## 2024-01-15 RX ADMIN — INSULIN HUMAN 1 UNITS: 100 INJECTION, SOLUTION PARENTERAL at 21:44

## 2024-01-15 RX ADMIN — GABAPENTIN 600 MG: 300 CAPSULE ORAL at 04:55

## 2024-01-15 RX ADMIN — DAPTOMYCIN 700 MG: 500 INJECTION, POWDER, LYOPHILIZED, FOR SOLUTION INTRAVENOUS at 15:34

## 2024-01-15 RX ADMIN — DOCUSATE SODIUM 50 MG AND SENNOSIDES 8.6 MG 2 TABLET: 8.6; 5 TABLET, FILM COATED ORAL at 17:46

## 2024-01-15 RX ADMIN — TIOTROPIUM BROMIDE INHALATION SPRAY 5 MCG: 3.12 SPRAY, METERED RESPIRATORY (INHALATION) at 08:48

## 2024-01-15 RX ADMIN — GABAPENTIN 600 MG: 300 CAPSULE ORAL at 13:54

## 2024-01-15 RX ADMIN — LOSARTAN POTASSIUM 100 MG: 50 TABLET, FILM COATED ORAL at 04:55

## 2024-01-15 ASSESSMENT — PAIN DESCRIPTION - PAIN TYPE
TYPE: ACUTE PAIN
TYPE: ACUTE PAIN

## 2024-01-15 ASSESSMENT — ENCOUNTER SYMPTOMS
MYALGIAS: 1
VOMITING: 0
WEAKNESS: 1
ABDOMINAL PAIN: 0
NAUSEA: 0
FEVER: 0
NERVOUS/ANXIOUS: 1

## 2024-01-15 NOTE — PROGRESS NOTES
"Hospital Medicine Daily Progress Note    Date of Service  1/15/2024    Chief Complaint  April Alicia is a 63 y.o. female admitted 1/11/2024 with recurrent admissions for chronically infected left knee arthroplasty who has failed multiple antibiotic spacers and multiple prolonged courses of IV antibiotics who was discharged from our hospital to [facility] on January 9, 2024 with no PICC line and possible IV daptomycin.  Patient returns from skilled nursing facility after being there 1 day with fevers and confusion.  Upon readmission from the ER during this hospital admission she was found to be COVID-positive but not hypoxic.  Her confusion is slightly improved and she has been admitted to the general medical floor and deferred surgery until January 16, 2024, which is her original surgical date.  She has been placed back on daptomycin and blood cultures which have been repeated are pending.  Per PT she is ambulating somewhat decently well but will remain in the hospital to her surgery will be done on Tuesday, January 16.    Hospital Course  See above    Interval Problem Update  1/12  Patient is mildly confused. She is adamant about not wanting to have her amputation surgery done today. She does not feel \"well\" in general. Remains afebrile. No other new issues.     1/13  Patient is tearful this AM since she is scared about getting another surgery next week. She knows she needs the surgery since she keeps getting infections in the L knee and is tired of the hospital visits and pain. Current pain relief is being achieved with oxycodone. Her malaise is a bit better today, remains afebrile and not requiring oxygen.    1/14  Patient appears comfortable. No acute issues overnight.      1/15  No issues overnight. Patient remains quite anxious about her upcoming surgery. Remains afebrile. Hard IV stick.     I have discussed this patient's plan of care and discharge plan at IDT rounds today with Case Management, " Nursing, Nursing leadership, and other members of the IDT team.    Consultants/Specialty  ID  Orthopedics    Code Status  DNAR/DNI    Disposition  The patient is not medically cleared for discharge to home or a post-acute facility.  Anticipate discharge to: skilled nursing facility    I have placed the appropriate orders for post-discharge needs.    Review of Systems  Review of Systems   Constitutional:  Negative for fever and malaise/fatigue.        Near her baseline now, 1/14   Gastrointestinal:  Negative for abdominal pain, nausea and vomiting.   Musculoskeletal:  Positive for joint pain and myalgias.        Pain levels remain tolerable   Neurological:  Positive for weakness.   Psychiatric/Behavioral:  The patient is nervous/anxious (similar levels noted 1/15).         Physical Exam  Temp:  [36.7 °C (98 °F)-36.9 °C (98.5 °F)] 36.9 °C (98.5 °F)  Pulse:  [56-80] 56  Resp:  [18] 18  BP: (130-159)/(75-88) 159/83  SpO2:  [95 %-99 %] 99 %    Physical Exam  Vitals and nursing note reviewed.   Constitutional:       General: She is not in acute distress.     Appearance: She is well-developed.      Comments: Mildly anxious appearing today   HENT:      Head: Normocephalic and atraumatic.      Mouth/Throat:      Pharynx: No oropharyngeal exudate.   Eyes:      General: No scleral icterus.     Pupils: Pupils are equal, round, and reactive to light.   Neck:      Thyroid: No thyromegaly.   Cardiovascular:      Rate and Rhythm: Normal rate and regular rhythm.      Heart sounds: Normal heart sounds. No murmur heard.  Pulmonary:      Effort: Pulmonary effort is normal. No respiratory distress.      Breath sounds: Normal breath sounds. No wheezing.   Abdominal:      General: Bowel sounds are normal. There is no distension.      Palpations: Abdomen is soft.      Tenderness: There is no abdominal tenderness.   Musculoskeletal:         General: Swelling and tenderness present. Normal range of motion.      Cervical back: Normal range of  motion and neck supple.      Left lower leg: Edema present.      Comments: R knee surgical scar is C/D/I  L knee with swelling and erythema, limited ROM and surgical scar which is well healed, TTP appreciated  No other changes noted 1/15   Skin:     General: Skin is warm and dry.      Findings: No rash.   Neurological:      Mental Status: She is alert and oriented to person, place, and time.      Cranial Nerves: No cranial nerve deficit.         Fluids    Intake/Output Summary (Last 24 hours) at 1/15/2024 1512  Last data filed at 1/15/2024 1000  Gross per 24 hour   Intake 480 ml   Output --   Net 480 ml       Laboratory  Recent Labs     01/13/24  1759   WBC 4.7*   RBC 4.22   HEMOGLOBIN 11.7*   HEMATOCRIT 34.8*   MCV 82.5   MCH 27.7   MCHC 33.6   RDW 47.8   PLATELETCT 116*   MPV 10.9     Recent Labs     01/13/24  1759   SODIUM 137   POTASSIUM 4.2   CHLORIDE 109   CO2 17*   GLUCOSE 129*   BUN 23*   CREATININE 0.71   CALCIUM 8.1*                     Imaging  US-EXTREMITY VENOUS LOWER UNILAT LEFT   Final Result      DX-CHEST-PORTABLE (1 VIEW)   Final Result      Cardiomegaly and mild interstitial opacity/edema.      No significant consolidation or pleural effusion.           Assessment/Plan  * Septic arthritis (HCC)- (present on admission)  Assessment & Plan  Patient is being followed by Ortho before and planning for surgery as outpatient  Patient came with fever  Ortho was consulted and planning for OR and I&D during this admission  Continue daptomycin, CPK is ok, blood cxs ngtd  Surgery to be performed 1/16  Keep in house  US LE venous duplex negative for DVT  WBC is normal, afebrile  No changes noted 1/15, NPO @ 12 am    Personally reviewed the blood cultures 1/15      Anemia- (present on admission)  Assessment & Plan  AOCD  Near her baseline  Follow    ACP (advance care planning)- (present on admission)  Assessment & Plan  Confirmed DNR/DNI    COVID-19- (present on admission)  Assessment & Plan  No respiratory  failure  Patient is on room air  Resolved    Sepsis (HCC)- (present on admission)  Assessment & Plan  resolved    Sleep apnea- (present on admission)  Assessment & Plan  CPAP at night    Type 2 diabetes mellitus with diabetic neuropathy, unspecified (HCC)- (present on admission)  Assessment & Plan  A1c 5.7  Continue ISS, BS's remain mildly elevated, continue for now for planned surgery  Personally reviewed above labs on 1/15/2024        Fever- (present on admission)  Assessment & Plan  Likely related to covid, but high risk for bacteremia  Defer PICC line until blood cultures result (may not need further antibiotics depending on level of amputation)  NO decadron at this time for covid  Afebrile for several days now    Hypomagnesemia- (present on admission)  Assessment & Plan  Resolved      Metabolic encephalopathy- (present on admission)  Assessment & Plan  Likely related to fever and sepsis   Resolved        Paroxysmal atrial fibrillation, hx of- (present on admission)  Assessment & Plan  No RVR  Continue metoprolol and losartan  Patient is not on anticoagulation before    Hypertension- (present on admission)  Assessment & Plan  Well controlled, no changes planned 1/15/2024  Continue lasix 20 mg daily, losartan 100 mg daily, toprol XL 25 mg daily    Thrombocytopenia (HCC)- (present on admission)  Assessment & Plan  Mild, trend    Diabetic polyneuropathy associated with type 2 diabetes mellitus (HCC)- (present on admission)  Assessment & Plan  Continue gabapentin  No dosing changes planned 1/15/2024           VTE prophylaxis:   SCDs/TEDs      I have performed a physical exam and reviewed and updated ROS and Plan today (1/15/2024). In review of yesterday's note (1/14/2024), there are no changes except as documented above.

## 2024-01-15 NOTE — CARE PLAN
The patient is Stable - Low risk of patient condition declining or worsening    Shift Goals  Clinical Goals: Obtain IV access for antibiotics during day shift  Patient Goals: None Stated  Family Goals: EMDUND    Progress made toward(s) clinical / shift goals:        Problem: Knowledge Deficit - Standard  Goal: Patient and family/care givers will demonstrate understanding of plan of care, disease process/condition, diagnostic tests and medications  Outcome: Progressing     Problem: Pain - Standard  Goal: Alleviation of pain or a reduction in pain to the patient’s comfort goal  Outcome: Progressing     Problem: Fall Risk  Goal: Patient will remain free from falls  Outcome: Progressing       Patient is not progressing towards the following goals:

## 2024-01-15 NOTE — CARE PLAN
The patient is Stable - Low risk of patient condition declining or worsening    Shift Goals  Clinical Goals: Patient's pain will be <4 throughout shift  Patient Goals: Sleep and rest  Family Goals: EDMUND    Progress made toward(s) clinical / shift goals:      Patient is alert and oriented x3, disoriented to time. Patient able to verbalize needs. Patient's pain is controlled with pain medication, rest and repositioning. Vascular consultation placed for either US guided PIV or midline to be placed. Patient has scheduled surgery on 1/16. Patient's bed alarm is on, bed in low position, call light within reach.

## 2024-01-15 NOTE — RESPIRATORY CARE
COPD EDUCATION by COPD CLINICAL EDUCATOR  1/15/2024 at 7:23 AM by Sun Jones, RRT     Patient reviewed by COPD education team. Patient does not have a history or diagnosis of COPD, has a PFT on file from 2020, 79% FEV1/FVC ratio and quit smoking in 2016,  Therefore, patient does not qualify for the COPD program.

## 2024-01-16 ENCOUNTER — ANESTHESIA EVENT (OUTPATIENT)
Dept: SURGERY | Facility: MEDICAL CENTER | Age: 64
DRG: 474 | End: 2024-01-16
Payer: MEDICAID

## 2024-01-16 ENCOUNTER — ANESTHESIA (OUTPATIENT)
Dept: SURGERY | Facility: MEDICAL CENTER | Age: 64
DRG: 474 | End: 2024-01-16
Payer: MEDICAID

## 2024-01-16 LAB
BACTERIA BLD CULT: NORMAL
BACTERIA BLD CULT: NORMAL
FUNGUS SPEC FUNGUS STN: NORMAL
GLUCOSE BLD STRIP.AUTO-MCNC: 132 MG/DL (ref 65–99)
GLUCOSE BLD STRIP.AUTO-MCNC: 135 MG/DL (ref 65–99)
GLUCOSE BLD STRIP.AUTO-MCNC: 159 MG/DL (ref 65–99)
GLUCOSE BLD STRIP.AUTO-MCNC: 253 MG/DL (ref 65–99)
GRAM STN SPEC: NORMAL
PATHOLOGY CONSULT NOTE: NORMAL
RHODAMINE-AURAMINE STN SPEC: NORMAL
SIGNIFICANT IND 70042: NORMAL
SITE SITE: NORMAL
SOURCE SOURCE: NORMAL

## 2024-01-16 PROCEDURE — 87015 SPECIMEN INFECT AGNT CONCNTJ: CPT | Mod: 91

## 2024-01-16 PROCEDURE — 160035 HCHG PACU - 1ST 60 MINS PHASE I: Performed by: ORTHOPAEDIC SURGERY

## 2024-01-16 PROCEDURE — 87116 MYCOBACTERIA CULTURE: CPT

## 2024-01-16 PROCEDURE — 87102 FUNGUS ISOLATION CULTURE: CPT

## 2024-01-16 PROCEDURE — 700105 HCHG RX REV CODE 258: Performed by: INTERNAL MEDICINE

## 2024-01-16 PROCEDURE — 87206 SMEAR FLUORESCENT/ACID STAI: CPT

## 2024-01-16 PROCEDURE — 700105 HCHG RX REV CODE 258: Performed by: ANESTHESIOLOGY

## 2024-01-16 PROCEDURE — 160002 HCHG RECOVERY MINUTES (STAT): Performed by: ORTHOPAEDIC SURGERY

## 2024-01-16 PROCEDURE — 82962 GLUCOSE BLOOD TEST: CPT | Mod: 91

## 2024-01-16 PROCEDURE — 87205 SMEAR GRAM STAIN: CPT | Mod: 91

## 2024-01-16 PROCEDURE — 87075 CULTR BACTERIA EXCEPT BLOOD: CPT

## 2024-01-16 PROCEDURE — 99233 SBSQ HOSP IP/OBS HIGH 50: CPT | Performed by: INTERNAL MEDICINE

## 2024-01-16 PROCEDURE — A9270 NON-COVERED ITEM OR SERVICE: HCPCS | Performed by: ANESTHESIOLOGY

## 2024-01-16 PROCEDURE — 700111 HCHG RX REV CODE 636 W/ 250 OVERRIDE (IP): Performed by: ORTHOPAEDIC SURGERY

## 2024-01-16 PROCEDURE — 700102 HCHG RX REV CODE 250 W/ 637 OVERRIDE(OP): Performed by: INTERNAL MEDICINE

## 2024-01-16 PROCEDURE — 3E0T3BZ INTRODUCTION OF ANESTHETIC AGENT INTO PERIPHERAL NERVES AND PLEXI, PERCUTANEOUS APPROACH: ICD-10-PCS | Performed by: ANESTHESIOLOGY

## 2024-01-16 PROCEDURE — 87070 CULTURE OTHR SPECIMN AEROBIC: CPT

## 2024-01-16 PROCEDURE — 700111 HCHG RX REV CODE 636 W/ 250 OVERRIDE (IP): Mod: JZ | Performed by: ANESTHESIOLOGY

## 2024-01-16 PROCEDURE — 160048 HCHG OR STATISTICAL LEVEL 1-5: Performed by: ORTHOPAEDIC SURGERY

## 2024-01-16 PROCEDURE — 110371 HCHG SHELL REV 272: Performed by: ORTHOPAEDIC SURGERY

## 2024-01-16 PROCEDURE — A9270 NON-COVERED ITEM OR SERVICE: HCPCS | Performed by: INTERNAL MEDICINE

## 2024-01-16 PROCEDURE — 88307 TISSUE EXAM BY PATHOLOGIST: CPT

## 2024-01-16 PROCEDURE — 700111 HCHG RX REV CODE 636 W/ 250 OVERRIDE (IP): Mod: JZ | Performed by: INTERNAL MEDICINE

## 2024-01-16 PROCEDURE — 700102 HCHG RX REV CODE 250 W/ 637 OVERRIDE(OP): Performed by: ANESTHESIOLOGY

## 2024-01-16 PROCEDURE — 88311 DECALCIFY TISSUE: CPT

## 2024-01-16 PROCEDURE — 160028 HCHG SURGERY MINUTES - 1ST 30 MINS LEVEL 3: Performed by: ORTHOPAEDIC SURGERY

## 2024-01-16 PROCEDURE — 770001 HCHG ROOM/CARE - MED/SURG/GYN PRIV*

## 2024-01-16 PROCEDURE — 0Y6D0Z3 DETACHMENT AT LEFT UPPER LEG, LOW, OPEN APPROACH: ICD-10-PCS | Performed by: ORTHOPAEDIC SURGERY

## 2024-01-16 PROCEDURE — 160009 HCHG ANES TIME/MIN: Performed by: ORTHOPAEDIC SURGERY

## 2024-01-16 PROCEDURE — 700101 HCHG RX REV CODE 250: Performed by: ANESTHESIOLOGY

## 2024-01-16 PROCEDURE — 160039 HCHG SURGERY MINUTES - EA ADDL 1 MIN LEVEL 3: Performed by: ORTHOPAEDIC SURGERY

## 2024-01-16 RX ORDER — PHENYLEPHRINE HCL IN 0.9% NACL 0.5 MG/5ML
SYRINGE (ML) INTRAVENOUS PRN
Status: DISCONTINUED | OUTPATIENT
Start: 2024-01-16 | End: 2024-01-16 | Stop reason: SURG

## 2024-01-16 RX ORDER — LABETALOL HYDROCHLORIDE 5 MG/ML
5 INJECTION, SOLUTION INTRAVENOUS
Status: DISCONTINUED | OUTPATIENT
Start: 2024-01-16 | End: 2024-01-16 | Stop reason: HOSPADM

## 2024-01-16 RX ORDER — CEFAZOLIN SODIUM 1 G/3ML
INJECTION, POWDER, FOR SOLUTION INTRAMUSCULAR; INTRAVENOUS PRN
Status: DISCONTINUED | OUTPATIENT
Start: 2024-01-16 | End: 2024-01-16 | Stop reason: SURG

## 2024-01-16 RX ORDER — HYDROMORPHONE HYDROCHLORIDE 1 MG/ML
0.2 INJECTION, SOLUTION INTRAMUSCULAR; INTRAVENOUS; SUBCUTANEOUS
Status: DISCONTINUED | OUTPATIENT
Start: 2024-01-16 | End: 2024-01-16 | Stop reason: HOSPADM

## 2024-01-16 RX ORDER — ONDANSETRON 2 MG/ML
4 INJECTION INTRAMUSCULAR; INTRAVENOUS
Status: DISCONTINUED | OUTPATIENT
Start: 2024-01-16 | End: 2024-01-16 | Stop reason: HOSPADM

## 2024-01-16 RX ORDER — BUPIVACAINE HYDROCHLORIDE 2.5 MG/ML
INJECTION, SOLUTION EPIDURAL; INFILTRATION; INTRACAUDAL PRN
Status: DISCONTINUED | OUTPATIENT
Start: 2024-01-16 | End: 2024-01-16 | Stop reason: SURG

## 2024-01-16 RX ORDER — SODIUM CHLORIDE, SODIUM LACTATE, POTASSIUM CHLORIDE, CALCIUM CHLORIDE 600; 310; 30; 20 MG/100ML; MG/100ML; MG/100ML; MG/100ML
INJECTION, SOLUTION INTRAVENOUS CONTINUOUS
Status: DISCONTINUED | OUTPATIENT
Start: 2024-01-16 | End: 2024-01-16 | Stop reason: HOSPADM

## 2024-01-16 RX ORDER — HYDROMORPHONE HYDROCHLORIDE 1 MG/ML
0.5 INJECTION, SOLUTION INTRAMUSCULAR; INTRAVENOUS; SUBCUTANEOUS
Status: DISCONTINUED | OUTPATIENT
Start: 2024-01-16 | End: 2024-01-22 | Stop reason: HOSPADM

## 2024-01-16 RX ORDER — HALOPERIDOL 5 MG/ML
1 INJECTION INTRAMUSCULAR
Status: DISCONTINUED | OUTPATIENT
Start: 2024-01-16 | End: 2024-01-16 | Stop reason: HOSPADM

## 2024-01-16 RX ORDER — OXYCODONE HCL 5 MG/5 ML
10 SOLUTION, ORAL ORAL
Status: COMPLETED | OUTPATIENT
Start: 2024-01-16 | End: 2024-01-16

## 2024-01-16 RX ORDER — OXYCODONE HYDROCHLORIDE 10 MG/1
10 TABLET ORAL
Status: DISCONTINUED | OUTPATIENT
Start: 2024-01-16 | End: 2024-01-22 | Stop reason: HOSPADM

## 2024-01-16 RX ORDER — MEPERIDINE HYDROCHLORIDE 25 MG/ML
12.5 INJECTION INTRAMUSCULAR; INTRAVENOUS; SUBCUTANEOUS
Status: DISCONTINUED | OUTPATIENT
Start: 2024-01-16 | End: 2024-01-16 | Stop reason: HOSPADM

## 2024-01-16 RX ORDER — HYDRALAZINE HYDROCHLORIDE 20 MG/ML
5 INJECTION INTRAMUSCULAR; INTRAVENOUS
Status: DISCONTINUED | OUTPATIENT
Start: 2024-01-16 | End: 2024-01-16 | Stop reason: HOSPADM

## 2024-01-16 RX ORDER — TOBRAMYCIN 1.2 G/30ML
INJECTION, POWDER, LYOPHILIZED, FOR SOLUTION INTRAVENOUS
Status: DISCONTINUED | OUTPATIENT
Start: 2024-01-16 | End: 2024-01-16 | Stop reason: HOSPADM

## 2024-01-16 RX ORDER — OXYCODONE HYDROCHLORIDE 5 MG/1
5 TABLET ORAL
Status: DISCONTINUED | OUTPATIENT
Start: 2024-01-16 | End: 2024-01-22 | Stop reason: HOSPADM

## 2024-01-16 RX ORDER — HYDROMORPHONE HYDROCHLORIDE 1 MG/ML
0.1 INJECTION, SOLUTION INTRAMUSCULAR; INTRAVENOUS; SUBCUTANEOUS
Status: DISCONTINUED | OUTPATIENT
Start: 2024-01-16 | End: 2024-01-16 | Stop reason: HOSPADM

## 2024-01-16 RX ORDER — ONDANSETRON 2 MG/ML
INJECTION INTRAMUSCULAR; INTRAVENOUS PRN
Status: DISCONTINUED | OUTPATIENT
Start: 2024-01-16 | End: 2024-01-16 | Stop reason: SURG

## 2024-01-16 RX ORDER — SODIUM CHLORIDE, SODIUM LACTATE, POTASSIUM CHLORIDE, CALCIUM CHLORIDE 600; 310; 30; 20 MG/100ML; MG/100ML; MG/100ML; MG/100ML
INJECTION, SOLUTION INTRAVENOUS
Status: DISCONTINUED | OUTPATIENT
Start: 2024-01-16 | End: 2024-01-16 | Stop reason: SURG

## 2024-01-16 RX ORDER — OXYCODONE HCL 5 MG/5 ML
5 SOLUTION, ORAL ORAL
Status: COMPLETED | OUTPATIENT
Start: 2024-01-16 | End: 2024-01-16

## 2024-01-16 RX ORDER — VANCOMYCIN HYDROCHLORIDE 1 G/20ML
INJECTION, POWDER, LYOPHILIZED, FOR SOLUTION INTRAVENOUS
Status: COMPLETED | OUTPATIENT
Start: 2024-01-16 | End: 2024-01-16

## 2024-01-16 RX ORDER — LIDOCAINE HYDROCHLORIDE 20 MG/ML
INJECTION, SOLUTION EPIDURAL; INFILTRATION; INTRACAUDAL; PERINEURAL PRN
Status: DISCONTINUED | OUTPATIENT
Start: 2024-01-16 | End: 2024-01-16 | Stop reason: SURG

## 2024-01-16 RX ORDER — HYDROMORPHONE HYDROCHLORIDE 1 MG/ML
0.4 INJECTION, SOLUTION INTRAMUSCULAR; INTRAVENOUS; SUBCUTANEOUS
Status: DISCONTINUED | OUTPATIENT
Start: 2024-01-16 | End: 2024-01-16 | Stop reason: HOSPADM

## 2024-01-16 RX ADMIN — ONDANSETRON 4 MG: 2 INJECTION INTRAMUSCULAR; INTRAVENOUS at 07:45

## 2024-01-16 RX ADMIN — FENTANYL CITRATE 50 MCG: 50 INJECTION, SOLUTION INTRAMUSCULAR; INTRAVENOUS at 10:05

## 2024-01-16 RX ADMIN — OXYCODONE HYDROCHLORIDE 10 MG: 5 SOLUTION ORAL at 10:00

## 2024-01-16 RX ADMIN — OXYCODONE HYDROCHLORIDE 10 MG: 10 TABLET ORAL at 23:46

## 2024-01-16 RX ADMIN — GABAPENTIN 600 MG: 300 CAPSULE ORAL at 04:45

## 2024-01-16 RX ADMIN — OMEPRAZOLE 20 MG: 20 CAPSULE, DELAYED RELEASE ORAL at 04:45

## 2024-01-16 RX ADMIN — HYDRALAZINE HYDROCHLORIDE 5 MG: 20 INJECTION, SOLUTION INTRAMUSCULAR; INTRAVENOUS at 11:07

## 2024-01-16 RX ADMIN — INSULIN HUMAN 1 UNITS: 100 INJECTION, SOLUTION PARENTERAL at 22:17

## 2024-01-16 RX ADMIN — GABAPENTIN 600 MG: 300 CAPSULE ORAL at 22:11

## 2024-01-16 RX ADMIN — INSULIN HUMAN 3 UNITS: 100 INJECTION, SOLUTION PARENTERAL at 18:38

## 2024-01-16 RX ADMIN — METOPROLOL SUCCINATE 25 MG: 25 TABLET, EXTENDED RELEASE ORAL at 22:12

## 2024-01-16 RX ADMIN — OXYCODONE HYDROCHLORIDE 10 MG: 10 TABLET ORAL at 19:50

## 2024-01-16 RX ADMIN — FENTANYL CITRATE 25 MCG: 50 INJECTION, SOLUTION INTRAMUSCULAR; INTRAVENOUS at 08:32

## 2024-01-16 RX ADMIN — FUROSEMIDE 20 MG: 20 TABLET ORAL at 04:45

## 2024-01-16 RX ADMIN — GABAPENTIN 600 MG: 300 CAPSULE ORAL at 14:48

## 2024-01-16 RX ADMIN — PROPOFOL 150 MG: 10 INJECTION, EMULSION INTRAVENOUS at 07:45

## 2024-01-16 RX ADMIN — FENTANYL CITRATE 50 MCG: 50 INJECTION, SOLUTION INTRAMUSCULAR; INTRAVENOUS at 09:16

## 2024-01-16 RX ADMIN — FENTANYL CITRATE 50 MCG: 50 INJECTION, SOLUTION INTRAMUSCULAR; INTRAVENOUS at 08:06

## 2024-01-16 RX ADMIN — LIDOCAINE HYDROCHLORIDE 100 MG: 20 INJECTION, SOLUTION EPIDURAL; INFILTRATION; INTRACAUDAL at 07:45

## 2024-01-16 RX ADMIN — FENTANYL CITRATE 25 MCG: 50 INJECTION, SOLUTION INTRAMUSCULAR; INTRAVENOUS at 08:15

## 2024-01-16 RX ADMIN — OXYCODONE 5 MG: 5 TABLET ORAL at 15:04

## 2024-01-16 RX ADMIN — SODIUM CHLORIDE, POTASSIUM CHLORIDE, SODIUM LACTATE AND CALCIUM CHLORIDE: 600; 310; 30; 20 INJECTION, SOLUTION INTRAVENOUS at 07:19

## 2024-01-16 RX ADMIN — HYDROMORPHONE HYDROCHLORIDE 0.4 MG: 1 INJECTION, SOLUTION INTRAMUSCULAR; INTRAVENOUS; SUBCUTANEOUS at 10:48

## 2024-01-16 RX ADMIN — DAPTOMYCIN 700 MG: 500 INJECTION, POWDER, LYOPHILIZED, FOR SOLUTION INTRAVENOUS at 14:52

## 2024-01-16 RX ADMIN — HYDROMORPHONE HYDROCHLORIDE 0.2 MG: 1 INJECTION, SOLUTION INTRAMUSCULAR; INTRAVENOUS; SUBCUTANEOUS at 11:00

## 2024-01-16 RX ADMIN — Medication 100 MCG: at 08:59

## 2024-01-16 RX ADMIN — FENTANYL CITRATE 50 MCG: 50 INJECTION, SOLUTION INTRAMUSCULAR; INTRAVENOUS at 10:01

## 2024-01-16 RX ADMIN — OXYCODONE 5 MG: 5 TABLET ORAL at 05:59

## 2024-01-16 RX ADMIN — CEFAZOLIN 2 G: 1 INJECTION, POWDER, FOR SOLUTION INTRAMUSCULAR; INTRAVENOUS at 07:45

## 2024-01-16 RX ADMIN — ATORVASTATIN CALCIUM 20 MG: 20 TABLET, FILM COATED ORAL at 22:11

## 2024-01-16 RX ADMIN — BUPIVACAINE HYDROCHLORIDE 20 ML: 2.5 INJECTION, SOLUTION EPIDURAL; INFILTRATION; INTRACAUDAL at 09:28

## 2024-01-16 RX ADMIN — LOSARTAN POTASSIUM 100 MG: 50 TABLET, FILM COATED ORAL at 04:45

## 2024-01-16 ASSESSMENT — ENCOUNTER SYMPTOMS
VOMITING: 0
WEAKNESS: 1
NAUSEA: 0
NERVOUS/ANXIOUS: 1
ABDOMINAL PAIN: 0
MYALGIAS: 1
FEVER: 0

## 2024-01-16 ASSESSMENT — PAIN DESCRIPTION - PAIN TYPE
TYPE: ACUTE PAIN
TYPE: SURGICAL PAIN
TYPE: ACUTE PAIN
TYPE: SURGICAL PAIN

## 2024-01-16 NOTE — CARE PLAN
The patient is Watcher - Medium risk of patient condition declining or worsening    Shift Goals  Clinical Goals: Pt will mobilize oob at least one time during day.  Patient Goals: rest for now, sleep  Family Goals: EDMUND    Progress made toward(s) clinical / shift goals:  pt to mobilize in room with sba, use of fall precuations, able to ambulate in room and with cares, to bathroom during day with use of fww and one person sba.     Patient is not progressing towards the following goals:progressing.

## 2024-01-16 NOTE — CARE PLAN
The patient is Stable - Low risk of patient condition declining or worsening    Shift Goals  Clinical Goals: patient's pain will be<4 throughout shift  Patient Goals: sleep and rest  Family Goals: EDMUND    Progress made toward(s) clinical / shift goals:      Patient is alert and oriented x3, disoriented with time. Patient able to verbalize needs. Patient ambulatory to the bathroom with front wheel walker, stand by assist. Patient expressed being anxious for the surgery tomorrow but is hopeful that it will all went well. Patient's pain is controlled with medication, rest and repositioning. Patient is on NPO since midnight, sips with medication. Patient's bed alarm is on, bed in low position. Call light within reach.

## 2024-01-16 NOTE — OP REPORT
DATE OF SERVICE:  01/16/2024     PREOPERATIVE DIAGNOSES:  1.  Chronic, persistent infected left total knee arthroplasty.  2.  Status post multiple revision left knee arthroplasty and insertion of   antibiotic spacer.     POSTOPERATIVE DIAGNOSES:  1.  Chronic, persistent infected left total knee arthroplasty.  2.  Status post multiple revision left knee arthroplasty and insertion of   antibiotic spacer.     PROCEDURES PERFORMED:  1.  Left transfemoral amputation.  2.  Removal of nonabsorbable antibiotic drug delivery device from the   intramedullary left femur.     SURGEON:  Obdulio Gray MD     ANESTHESIOLOGIST:  Tobey Gansert, MD     ANESTHESIA:  General and regional.     ESTIMATED BLOOD LOSS:  200 mL.     TOURNIQUET TIME:  45 minutes at 250 mmHg, left thigh.     SPECIMENS:  Bone from the residual intramedullary canal of the femur was sent   to lab for aerobic, anaerobic, AFB and fungal cultures.     INDICATIONS FOR PROCEDURE:  The patient is 63 years old.  She has a history of   left knee arthroplasty complicated by multiple infections treated with   surgical debridement, revision knee arthroplasty and at least 2 separate   antibiotic spacer placement and IV antibiotic therapy.  She has failed limb   salvage and eradication of her infection.  Dr. Luz asked if I would be   available to consider transfemoral amputation.  I met the patient and her   family on an outpatient basis and we discussed this option for definitive   management of her refractory infection and this is how she wished to proceed.    After discussing risks, benefits and alternatives, she signed informed   consent and wished to proceed with surgery as outlined above.  Initially, she   was scheduled for an outpatient basis, but she was admitted last week from her   skilled nursing facility due to lack of IV access and was found to be COVID   positive, but asymptomatic from a cardiopulmonary standpoint.     DESCRIPTION OF PROCEDURE:  The  patient was met in the preoperative holding   area.  Her surgical site was signed.  Her consent was confirmed to be   accurate.  She was taken back to the operating room and general anesthesia was   induced.  A tourniquet was applied to the left thigh.  The left lower   extremity was prepped and draped in the usual sterile fashion.  A formal   timeout was performed to confirm patient's correct name, correct surgical   site, correct procedure and correct laterality.  The limb was exsanguinated   with an Esmarch and the tourniquet was inflated to 250 mmHg.  A fishmouth type   incision was made consistent with flaps for transfemoral amputation with a   scalpel down through skin.  Dissection was carried through the proximal   portion of the quadriceps tendon and the quadriceps muscle in that area   directly down to the femur.  This did expose the knee joint.  There was dark   tinged fluid within the knee joint, which was removed, circumferentially   mobilized the tissue around the femur approximately 12 cm proximal to the   medial aspect of the knee joint.  I used an oscillating saw and appropriate   retractors to cut the femur and then used an amputation knife to resect a long   posterior flap with a hamstring musculature and myocutaneous flap   posteriorly.  I was able to remove the antibiotic intramedullary spacer from   the residual femur without difficulty.  I then thoroughly lavaged the wound   with a Pulsavac using normal saline.  I identified the femoral vessels and the   sciatic nerve.  I placed the sciatic nerve on tension, sharply transected it   and allowed it to retract proximally to prevent painful neuroma formation and   used 2-0 silk stick ties and 0 silk ties to ligate the vessels.  I then   released the tourniquet and there was a sufficient hemostasis just a little   bit of oozing from the muscle diffusely, but there was a little bit of   bleeding from the intramedullary canal, which I subsequently was  able to   tamponade with bone wax after placing tobramycin and vancomycin within the   intramedullary canal and harvesting some intramedullary bone of the remaining   femur to send lab for aerobic, anaerobic, AFB and fungal cultures.  I had   placed drill holes in the lateral cortex of the femur and after I had packed   the intramedullary canal with a bone wax to prevent continued bleeding, I   performed abductor myodesis with #5 Ti-Cron and securely tensioned it down.  I   then thoroughly lavaged the wound once more.  There was a slow amount of   diffuse oozing, but no active arterial or active venous bleeding.  I repaired   the fascial layers with 0 Vicryl, subcutaneous layers with 0 Vicryl, 2-0   Vicryl and skin edges with staples without any significant tension on the   skin.  The wound was thoroughly cleansed and dried and sterile compressive   dressing was applied.  Dr. Gansert then performed a regional anesthetic at my   request to help with postoperative pain control.  The tourniquet had been   deflated after 45 minutes.  She was awoken from anesthesia, [place] and taken to postanesthesia care unit in stable condition.     PLAN:  1.  The patient will be readmitted postoperatively.  2.  Recommend continuing antibiotics just given the complexity of her previous   infection, the treatment of potential residual infection and following up   intraoperative cultures accordingly.  We will ultimately defer to infectious   disease service for definitive antibiotic recommendations.  3.  She should mobilize with physical and occupational therapy as soon as   possible.  4.  She should be continued on Lovenox or equivalent tomorrow morning if   otherwise clinically appropriate.  5.  She should follow up with me in 3-4 weeks for routine wound check and   staple removal once there has been sufficient healing of her wound.        ______________________________  MD MELBA Brady/ARELIS    DD:   01/16/2024 09:43  DT:  01/16/2024 10:20    Job#:  [id number]

## 2024-01-16 NOTE — OR NURSING
0936:  Pt arrived from OR, handoff received from anesthesiologist and RN: Patient asleep with OPA in place, breathing even and unlabored. Vitals stable. Rhythm appears afib, rate in 60s. Per anesthesia, afib hx. LLE dressing C/D/I.     1000: Patient awake, A&Ox3, disoriented to time. WALKER, denies presence of numbness and tingling. Medicated for pian per MAR.     1030: Patient reports pain improved, at tolerable level. Patient voided x2.     1040: Daughter updated on status.     1045: Anesthesia aware of elevated blood pressure, awaiting order/s.     1100: Order for iv hydralazine 5mg, received and administered.     1115: Blood pressure improved. Report given to S6 RN, Pily. Placed on 10L via oxy mask, per ERAS order. Awaiting transport.

## 2024-01-16 NOTE — OR SURGEON
Immediate Post OP Note    PreOp Diagnosis: Left chronic/persistent infected knee arthrplasty      PostOp Diagnosis: same      Procedure(s):  LEFT ABOVE KNEE AMPUTATION - Wound Class: Dirty or Infected    Surgeon(s):  Obdulio Gray M.D.    Anesthesiologist/Type of Anesthesia:  Anesthesiologist: Ray Cooper M.D.; Tobey Gansert, M.D./General    Surgical Staff:  Circulator: Rosetta Crespo R.N.  Limb Ramos: Luz Dorado R.N.  Relief Scrub: Jarek Bailon  Scrub Person: Srikanth Shaffer; Raymundo Gonzalez    Specimens removed if any:  ID Type Source Tests Collected by Time Destination   1 : Left Femur Bone Leg AFB CULTURE, FUNGAL CULTURE, AEROBIC/ANAEROBIC CULTURE (SURGERY) Obdulio Gray M.D. 1/16/2024  8:37 AM    A : Left Above Knee Amputation Tissue Leg PATHOLOGY SPECIMEN Obdulio Gray M.D. 1/16/2024  8:21 AM        Estimated Blood Loss: 200cc    Findings: see dictation    Complications: none known    PLAN:  --readmit postop  --NWB LLE  --continue abx per ID recs, fu intra-op cultures  --PT/OT  --okay to restart lovenox or equivalent tomorrow am, continue SCD RLE  --fu 3-4 weeks postop        1/16/2024 9:32 AM Obdulio Gray M.D.

## 2024-01-16 NOTE — PROGRESS NOTES
63yoF with chronic left septic knee arthroplasty s/p multiple revision and abx spacer with persistent infection.      S: NPO awaiting surgery, daughter at bedside    O:    Vitals:    01/16/24 0702   BP: (!) 143/94   Pulse: 62   Resp: 18   Temp: 36.5 °C (97.7 °F)   SpO2: 98%     Exam:  General-NAD, alert and following commands  LLE-midline knee incision healed, swelling at knee, no open wounds seen    A:  63yoF with chronic left septic knee arthroplasty s/p multiple revision and abx spacer with persistent infection.      Recs:  --Based on previous discussions and patients desire for definitive treamtent of her left knee chronic/persistent infection she would like to proceed with left transfemoral amputation  --NPO planning left AKA today

## 2024-01-16 NOTE — ANESTHESIA TIME REPORT
Anesthesia Start and Stop Event Times       Date Time Event    1/16/2024 0710 Ready for Procedure     0739 Anesthesia Start     0936 Anesthesia Stop          Responsible Staff  01/16/24      Name Role Begin End    Tobey Gansert, M.D. Anesth 0739 0975          Overtime Reason:  no overtime (within assigned shift)    Comments:

## 2024-01-16 NOTE — ANESTHESIA PREPROCEDURE EVALUATION
Case: [id number] Date/Time: 01/16/24 0730    Procedure: LEFT ABOVE KNEE AMPUTATION OTHER INDICATED PROCEDURES (Left)    Pre-op diagnosis: INFECTION ASSOC W/ PROSTHESIS OF L KNEE JOINT    Location: [facility] OR 09 / SURGERY [facility]    Surgeons: Obdulio Gray M.D.            Relevant Problems   ANESTHESIA   (positive) Sleep apnea      PULMONARY   (positive) Mild intermittent asthma without complication      NEURO   (positive) Migraine with aura and without status migrainosus, not intractable      CARDIAC   (positive) Hypertension   (positive) Migraine with aura and without status migrainosus, not intractable   (positive) Mitral valve regurgitation   (positive) Murmur   (positive) Paroxysmal atrial fibrillation, hx of      GI   (positive) GERD (gastroesophageal reflux disease)         (positive) Cirrhosis, alcoholic (HCC)      Other   (positive) Septic arthritis (HCC)       Physical Exam    Airway   Mallampati: II  TM distance: >3 FB  Neck ROM: full       Cardiovascular - normal exam  Rhythm: regular  Rate: normal  (-) murmur     Dental - normal exam           Pulmonary - normal exam  Breath sounds clear to auscultation     Abdominal    Neurological - normal exam                   Anesthesia Plan    ASA 3   ASA physical status 3 criteria: COPD - poorly controlled    Plan - general and peripheral nerve block     Peripheral nerve block will be post-op pain control  Airway plan will be LMA          Induction: intravenous    Postoperative Plan: Postoperative administration of opioids is intended.    Pertinent diagnostic labs and testing reviewed    Informed Consent:    Anesthetic plan and risks discussed with patient.    Use of blood products discussed with: patient whom consented to blood products.

## 2024-01-16 NOTE — CARE PLAN
The patient is Watcher - Medium risk of patient condition declining or worsening    Shift Goals  Clinical Goals: Pt will be monitored and post op vitals complete after surgery  Patient Goals: rest for now, sleep  Family Goals: EDMUND    Progress made toward(s) clinical / shift goals:  Drsng to LLE to remain CDI, with no noted bleeding/drainage under bandage, nursing to monitor and reinforce as needed. Elevation of pillow, post op vitals recorded, pt able to swallow, drinking fluids independently at bedside.     Patient is not progressing towards the following goals:  Progressing.

## 2024-01-16 NOTE — ANESTHESIA POSTPROCEDURE EVALUATION
Patient: April Alicia    Procedure Summary       Date: 01/16/24 Room / Location: [place] [address] / SURGERY [facility]    Anesthesia Start: 0739 Anesthesia Stop: 0936    Procedure: LEFT ABOVE KNEE AMPUTATION (Left: Leg Upper) Diagnosis: (INFECTION ASSOC W/ PROSTHESIS OF L KNEE JOINT)    Surgeons: Obdulio Gray M.D. Responsible Provider: Tobey Gansert, M.D.    Anesthesia Type: general ASA Status: 3            Final Anesthesia Type: general  Last vitals  BP   Blood Pressure: (!) 155/86    Temp   36.1 °C (97 °F)    Pulse   77   Resp   20    SpO2   96 %      Anesthesia Post Evaluation    Patient location during evaluation: PACU  Patient participation: complete - patient participated  Level of consciousness: awake and alert    Airway patency: patent  Anesthetic complications: no  Cardiovascular status: hemodynamically stable  Respiratory status: acceptable  Hydration status: euvolemic    PONV: none          No notable events documented.     Nurse Pain Score: 5 (NPRS)

## 2024-01-16 NOTE — ANESTHESIA PROCEDURE NOTES
Airway    Date/Time: 1/16/2024 7:45 AM    Performed by: Tobey Gansert, M.D.  Authorized by: Tobey Gansert, M.D.    Location:  OR  Urgency:  Elective  Indications for Airway Management:  Anesthesia      Spontaneous Ventilation: absent    Sedation Level:  Deep  Preoxygenated: Yes    Final Airway Type:  Supraglottic airway  Final Supraglottic Airway:  Standard LMA    SGA Size:  3  Number of Attempts at Approach:  1

## 2024-01-16 NOTE — PROGRESS NOTES
Hospital Medicine Daily Progress Note    Date of Service  1/16/2024    Chief Complaint  April Alicia is a 63 y.o. female admitted 1/11/2024 with recurrent admissions for chronically infected left knee arthroplasty who has failed multiple antibiotic spacers and multiple prolonged courses of IV antibiotics who was discharged from our hospital to [facility] on January 9, 2024 with no PICC line and possible IV daptomycin.  Patient returns from skilled nursing facility after being there 1 day with fevers and confusion.  Upon readmission from the ER during this hospital admission she was found to be COVID-positive but not hypoxic.  Her confusion is slightly improved and she has been admitted to the general medical floor and deferred surgery until January 16, 2024, which is her original surgical date.  She has been placed back on daptomycin and blood cultures which have been repeated are pending.  Per PT she is ambulating somewhat decently well but will remain in the hospital to her surgery will be done on Tuesday, January 16.    Hospital Course  See above    Interval Problem Update  Patient was seen and examined at bedside.  I have personally reviewed and interpreted vitals, labs, and imaging.    1/16.  Afebrile.  Mild hypertension.  On room air.  Tolerated left above-knee amputation this morning.  Currently states pain is controlled.  Still feels sedated from anesthesia.  Discussed plan of care with patient and family at bedside.  Discussed with ID who will see tomorrow.  Continue daptomycin for now    I have discussed this patient's plan of care and discharge plan at IDT rounds today with Case Management, Nursing, Nursing leadership, and other members of the IDT team.    Consultants/Specialty  ID  Orthopedics    Code Status  DNAR/DNI    Disposition  The patient is not medically cleared for discharge to home or a post-acute facility.  Anticipate discharge to: skilled nursing facility    I have placed the  appropriate orders for post-discharge needs.    Review of Systems  Review of Systems   Constitutional:  Negative for fever and malaise/fatigue.        Near her baseline now, 1/14   Gastrointestinal:  Negative for abdominal pain, nausea and vomiting.   Musculoskeletal:  Positive for joint pain and myalgias.        Pain levels remain tolerable   Neurological:  Positive for weakness.   Psychiatric/Behavioral:  The patient is nervous/anxious (similar levels noted 1/15).         Physical Exam  Temp:  [36.1 °C (96.9 °F)-36.7 °C (98 °F)] 36.4 °C (97.5 °F)  Pulse:  [60-88] 67  Resp:  [13-20] 17  BP: (112-177)/() 137/89  SpO2:  [96 %-100 %] 100 %    Physical Exam  Vitals and nursing note reviewed.   Constitutional:       General: She is not in acute distress.     Appearance: She is well-developed.      Comments: Mildly anxious appearing today   HENT:      Head: Normocephalic and atraumatic.      Mouth/Throat:      Pharynx: No oropharyngeal exudate.   Eyes:      General: No scleral icterus.     Pupils: Pupils are equal, round, and reactive to light.   Neck:      Thyroid: No thyromegaly.   Cardiovascular:      Rate and Rhythm: Normal rate and regular rhythm.      Heart sounds: Normal heart sounds. No murmur heard.  Pulmonary:      Effort: Pulmonary effort is normal. No respiratory distress.      Breath sounds: Normal breath sounds. No wheezing.   Abdominal:      General: Bowel sounds are normal. There is no distension.      Palpations: Abdomen is soft.      Tenderness: There is no abdominal tenderness.   Musculoskeletal:         General: Swelling and tenderness present. Normal range of motion.      Cervical back: Normal range of motion and neck supple.      Left lower leg: Edema present.      Comments: R knee surgical scar is C/D/I  L knee with swelling and erythema, limited ROM and surgical scar which is well healed, TTP appreciated  No other changes noted 1/15   Skin:     General: Skin is warm and dry.      Findings:  No rash.   Neurological:      Mental Status: She is alert and oriented to person, place, and time.      Cranial Nerves: No cranial nerve deficit.         Fluids    Intake/Output Summary (Last 24 hours) at 1/16/2024 1338  Last data filed at 1/16/2024 0947  Gross per 24 hour   Intake 1120 ml   Output 100 ml   Net 1020 ml         Laboratory  Recent Labs     01/13/24  1759   WBC 4.7*   RBC 4.22   HEMOGLOBIN 11.7*   HEMATOCRIT 34.8*   MCV 82.5   MCH 27.7   MCHC 33.6   RDW 47.8   PLATELETCT 116*   MPV 10.9       Recent Labs     01/13/24  1759   SODIUM 137   POTASSIUM 4.2   CHLORIDE 109   CO2 17*   GLUCOSE 129*   BUN 23*   CREATININE 0.71   CALCIUM 8.1*                       Imaging  US-EXTREMITY VENOUS LOWER UNILAT LEFT   Final Result      DX-CHEST-PORTABLE (1 VIEW)   Final Result      Cardiomegaly and mild interstitial opacity/edema.      No significant consolidation or pleural effusion.           Assessment/Plan  * Septic arthritis (HCC)- (present on admission)  Assessment & Plan  1/16/2024  Patient is being followed by Ortho before and planning for surgery as outpatient  Patient came with fever  Ortho was consulted and planning for OR and I&D during this admission  Continue daptomycin, CPK is ok, blood cxs ngtd  Surgery to be performed 1/16  Keep in house  US LE venous duplex negative for DVT  WBC is normal, afebrile  Status post left above-knee amputation 1/16    Personally reviewed the blood cultures 1/15      Anemia- (present on admission)  Assessment & Plan  1/16/2024  AOCD  Near her baseline  Follow    ACP (advance care planning)- (present on admission)  Assessment & Plan  1/16/2024  Confirmed DNR/DNI    COVID-19- (present on admission)  Assessment & Plan  1/16/2024  No respiratory failure  Patient is on room air  Resolved    Sepsis (HCC)- (present on admission)  Assessment & Plan  1/16/2024  resolved    Sleep apnea- (present on admission)  Assessment & Plan  1/16/2024  CPAP at night    Type 2 diabetes mellitus  with diabetic neuropathy, unspecified (HCC)- (present on admission)  Assessment & Plan  1/16/2024  A1c 5.7  Continue ISS, BS's remain mildly elevated, continue for now for planned surgery    Fever- (present on admission)  Assessment & Plan  1/16/2024  Likely related to covid, but high risk for bacteremia  Defer PICC line until blood cultures result (may not need further antibiotics depending on level of amputation)  NO decadron at this time for covid  Afebrile for several days now    Hypomagnesemia- (present on admission)  Assessment & Plan  1/16/2024  Monitor     Metabolic encephalopathy- (present on admission)  Assessment & Plan  1/16/2024  Likely related to fever and sepsis   Resolved    Paroxysmal atrial fibrillation, hx of- (present on admission)  Assessment & Plan  1/16/2024  No RVR  Continue metoprolol and losartan  Patient is not on anticoagulation before    Hypertension- (present on admission)  Assessment & Plan  1/16/2024  Continue lasix 20 mg daily, losartan 100 mg daily, toprol XL 25 mg daily    Thrombocytopenia (HCC)- (present on admission)  Assessment & Plan  1/16/2024  Mild, trend    Diabetic polyneuropathy associated with type 2 diabetes mellitus (HCC)- (present on admission)  Assessment & Plan  1/16/2024  Continue gabapentin         VTE prophylaxis: Hold anticoagulation with surgery today.    I have performed a physical exam and reviewed and updated ROS and Plan today (1/16/2024). In review of yesterday's note (1/15/2024), there are no changes except as documented above.    Greater than 52 minutes spent prepping to see patient (e.g. review of tests) obtaining and/or reviewing separately obtained history. Performing a medically appropriate examination and/ evaluation.  Counseling and educating the patient/family/caregiver.  Ordering medications, tests, or procedures.  Referring and communicating with other health care professionals.  Documenting clinical information in EPIC.  Independently  interpreting results and communicating results to patient/family/caregiver.  Care coordination.

## 2024-01-17 LAB
ANION GAP SERPL CALC-SCNC: 8 MMOL/L (ref 7–16)
BASOPHILS # BLD AUTO: 0.9 % (ref 0–1.8)
BASOPHILS # BLD: 0.05 K/UL (ref 0–0.12)
BUN SERPL-MCNC: 19 MG/DL (ref 8–22)
CALCIUM SERPL-MCNC: 7.4 MG/DL (ref 8.5–10.5)
CHLORIDE SERPL-SCNC: 109 MMOL/L (ref 96–112)
CO2 SERPL-SCNC: 20 MMOL/L (ref 20–33)
COMMENT 1642: NORMAL
CREAT SERPL-MCNC: 0.66 MG/DL (ref 0.5–1.4)
EOSINOPHIL # BLD AUTO: 0.12 K/UL (ref 0–0.51)
EOSINOPHIL NFR BLD: 2.1 % (ref 0–6.9)
ERYTHROCYTE [DISTWIDTH] IN BLOOD BY AUTOMATED COUNT: 49.1 FL (ref 35.9–50)
GFR SERPLBLD CREATININE-BSD FMLA CKD-EPI: 98 ML/MIN/1.73 M 2
GLUCOSE BLD STRIP.AUTO-MCNC: 121 MG/DL (ref 65–99)
GLUCOSE BLD STRIP.AUTO-MCNC: 154 MG/DL (ref 65–99)
GLUCOSE BLD STRIP.AUTO-MCNC: 155 MG/DL (ref 65–99)
GLUCOSE BLD STRIP.AUTO-MCNC: 169 MG/DL (ref 65–99)
GLUCOSE BLD STRIP.AUTO-MCNC: 174 MG/DL (ref 65–99)
GLUCOSE SERPL-MCNC: 184 MG/DL (ref 65–99)
HCT VFR BLD AUTO: 29.8 % (ref 37–47)
HGB BLD-MCNC: 9.9 G/DL (ref 12–16)
IMM GRANULOCYTES # BLD AUTO: 0.01 K/UL (ref 0–0.11)
IMM GRANULOCYTES NFR BLD AUTO: 0.2 % (ref 0–0.9)
LYMPHOCYTES # BLD AUTO: 1.53 K/UL (ref 1–4.8)
LYMPHOCYTES NFR BLD: 26.4 % (ref 22–41)
MAGNESIUM SERPL-MCNC: 1.4 MG/DL (ref 1.5–2.5)
MCH RBC QN AUTO: 27.6 PG (ref 27–33)
MCHC RBC AUTO-ENTMCNC: 33.2 G/DL (ref 32.2–35.5)
MCV RBC AUTO: 83 FL (ref 81.4–97.8)
MONOCYTES # BLD AUTO: 0.55 K/UL (ref 0–0.85)
MONOCYTES NFR BLD AUTO: 9.5 % (ref 0–13.4)
MORPHOLOGY BLD-IMP: NORMAL
NEUTROPHILS # BLD AUTO: 3.54 K/UL (ref 1.82–7.42)
NEUTROPHILS NFR BLD: 60.9 % (ref 44–72)
NRBC # BLD AUTO: 0 K/UL
NRBC BLD-RTO: 0 /100 WBC (ref 0–0.2)
OVALOCYTES BLD QL SMEAR: NORMAL
PHOSPHATE SERPL-MCNC: 2.6 MG/DL (ref 2.5–4.5)
PLATELET # BLD AUTO: 72 K/UL (ref 164–446)
PLATELET BLD QL SMEAR: NORMAL
PLATELETS.RETICULATED NFR BLD AUTO: 1.3 % (ref 0.6–13.1)
PMV BLD AUTO: 10.4 FL (ref 9–12.9)
POIKILOCYTOSIS BLD QL SMEAR: NORMAL
POTASSIUM SERPL-SCNC: 3.9 MMOL/L (ref 3.6–5.5)
RBC # BLD AUTO: 3.59 M/UL (ref 4.2–5.4)
RBC BLD AUTO: PRESENT
SCHISTOCYTES BLD QL SMEAR: NORMAL
SODIUM SERPL-SCNC: 137 MMOL/L (ref 135–145)
WBC # BLD AUTO: 5.8 K/UL (ref 4.8–10.8)

## 2024-01-17 PROCEDURE — 700102 HCHG RX REV CODE 250 W/ 637 OVERRIDE(OP): Performed by: INTERNAL MEDICINE

## 2024-01-17 PROCEDURE — 36415 COLL VENOUS BLD VENIPUNCTURE: CPT

## 2024-01-17 PROCEDURE — 99223 1ST HOSP IP/OBS HIGH 75: CPT | Performed by: INTERNAL MEDICINE

## 2024-01-17 PROCEDURE — 700111 HCHG RX REV CODE 636 W/ 250 OVERRIDE (IP): Mod: JZ | Performed by: INTERNAL MEDICINE

## 2024-01-17 PROCEDURE — 82962 GLUCOSE BLOOD TEST: CPT

## 2024-01-17 PROCEDURE — 80048 BASIC METABOLIC PNL TOTAL CA: CPT

## 2024-01-17 PROCEDURE — 700105 HCHG RX REV CODE 258: Performed by: INTERNAL MEDICINE

## 2024-01-17 PROCEDURE — A9270 NON-COVERED ITEM OR SERVICE: HCPCS | Performed by: INTERNAL MEDICINE

## 2024-01-17 PROCEDURE — 83735 ASSAY OF MAGNESIUM: CPT

## 2024-01-17 PROCEDURE — 770001 HCHG ROOM/CARE - MED/SURG/GYN PRIV*

## 2024-01-17 PROCEDURE — 85025 COMPLETE CBC W/AUTO DIFF WBC: CPT

## 2024-01-17 PROCEDURE — 97167 OT EVAL HIGH COMPLEX 60 MIN: CPT

## 2024-01-17 PROCEDURE — 99233 SBSQ HOSP IP/OBS HIGH 50: CPT | Performed by: INTERNAL MEDICINE

## 2024-01-17 PROCEDURE — 85055 RETICULATED PLATELET ASSAY: CPT

## 2024-01-17 PROCEDURE — 97164 PT RE-EVAL EST PLAN CARE: CPT

## 2024-01-17 PROCEDURE — 84100 ASSAY OF PHOSPHORUS: CPT

## 2024-01-17 RX ORDER — MAGNESIUM SULFATE HEPTAHYDRATE 40 MG/ML
2 INJECTION, SOLUTION INTRAVENOUS ONCE
Status: COMPLETED | OUTPATIENT
Start: 2024-01-17 | End: 2024-01-17

## 2024-01-17 RX ORDER — LANOLIN ALCOHOL/MO/W.PET/CERES
400 CREAM (GRAM) TOPICAL 2 TIMES DAILY
Status: COMPLETED | OUTPATIENT
Start: 2024-01-17 | End: 2024-01-18

## 2024-01-17 RX ORDER — ENOXAPARIN SODIUM 100 MG/ML
40 INJECTION SUBCUTANEOUS DAILY
Status: DISCONTINUED | OUTPATIENT
Start: 2024-01-18 | End: 2024-01-22 | Stop reason: HOSPADM

## 2024-01-17 RX ADMIN — DOCUSATE SODIUM 50 MG AND SENNOSIDES 8.6 MG 2 TABLET: 8.6; 5 TABLET, FILM COATED ORAL at 06:05

## 2024-01-17 RX ADMIN — INSULIN HUMAN 1 UNITS: 100 INJECTION, SOLUTION PARENTERAL at 08:34

## 2024-01-17 RX ADMIN — TIOTROPIUM BROMIDE INHALATION SPRAY 5 MCG: 3.12 SPRAY, METERED RESPIRATORY (INHALATION) at 08:42

## 2024-01-17 RX ADMIN — OXYCODONE HYDROCHLORIDE 10 MG: 10 TABLET ORAL at 19:36

## 2024-01-17 RX ADMIN — GABAPENTIN 600 MG: 300 CAPSULE ORAL at 15:26

## 2024-01-17 RX ADMIN — FUROSEMIDE 20 MG: 20 TABLET ORAL at 06:05

## 2024-01-17 RX ADMIN — DAPTOMYCIN 700 MG: 500 INJECTION, POWDER, LYOPHILIZED, FOR SOLUTION INTRAVENOUS at 15:32

## 2024-01-17 RX ADMIN — OMEPRAZOLE 20 MG: 20 CAPSULE, DELAYED RELEASE ORAL at 06:14

## 2024-01-17 RX ADMIN — GABAPENTIN 600 MG: 300 CAPSULE ORAL at 06:05

## 2024-01-17 RX ADMIN — INSULIN HUMAN 1 UNITS: 100 INJECTION, SOLUTION PARENTERAL at 22:14

## 2024-01-17 RX ADMIN — GABAPENTIN 600 MG: 300 CAPSULE ORAL at 22:09

## 2024-01-17 RX ADMIN — ATORVASTATIN CALCIUM 20 MG: 20 TABLET, FILM COATED ORAL at 22:09

## 2024-01-17 RX ADMIN — Medication 400 MG: at 17:23

## 2024-01-17 RX ADMIN — OXYCODONE HYDROCHLORIDE 10 MG: 10 TABLET ORAL at 15:25

## 2024-01-17 RX ADMIN — DIBASIC SODIUM PHOSPHATE, MONOBASIC POTASSIUM PHOSPHATE AND MONOBASIC SODIUM PHOSPHATE 500 MG: 852; 155; 130 TABLET ORAL at 22:23

## 2024-01-17 RX ADMIN — OXYCODONE HYDROCHLORIDE 10 MG: 10 TABLET ORAL at 11:00

## 2024-01-17 RX ADMIN — LOSARTAN POTASSIUM 100 MG: 50 TABLET, FILM COATED ORAL at 06:06

## 2024-01-17 RX ADMIN — METOPROLOL SUCCINATE 25 MG: 25 TABLET, EXTENDED RELEASE ORAL at 22:09

## 2024-01-17 RX ADMIN — DOCUSATE SODIUM 50 MG AND SENNOSIDES 8.6 MG 2 TABLET: 8.6; 5 TABLET, FILM COATED ORAL at 17:22

## 2024-01-17 RX ADMIN — OXYCODONE HYDROCHLORIDE 10 MG: 10 TABLET ORAL at 06:05

## 2024-01-17 RX ADMIN — MAGNESIUM SULFATE HEPTAHYDRATE 2 G: 2 INJECTION, SOLUTION INTRAVENOUS at 17:29

## 2024-01-17 RX ADMIN — INSULIN HUMAN 1 UNITS: 100 INJECTION, SOLUTION PARENTERAL at 11:14

## 2024-01-17 RX ADMIN — DIBASIC SODIUM PHOSPHATE, MONOBASIC POTASSIUM PHOSPHATE AND MONOBASIC SODIUM PHOSPHATE 500 MG: 852; 155; 130 TABLET ORAL at 15:26

## 2024-01-17 RX ADMIN — INSULIN HUMAN 1 UNITS: 100 INJECTION, SOLUTION PARENTERAL at 17:29

## 2024-01-17 RX ADMIN — LACTULOSE 15 ML: 10 SOLUTION ORAL; RECTAL at 06:10

## 2024-01-17 ASSESSMENT — ENCOUNTER SYMPTOMS
ABDOMINAL PAIN: 0
WEAKNESS: 1
NAUSEA: 0
VOMITING: 0
FEVER: 0
NERVOUS/ANXIOUS: 1
MYALGIAS: 1

## 2024-01-17 ASSESSMENT — COGNITIVE AND FUNCTIONAL STATUS - GENERAL
CLIMB 3 TO 5 STEPS WITH RAILING: TOTAL
WALKING IN HOSPITAL ROOM: TOTAL
TOILETING: A LOT
MOBILITY SCORE: 10
DRESSING REGULAR LOWER BODY CLOTHING: A LOT
DRESSING REGULAR UPPER BODY CLOTHING: A LITTLE
MOVING FROM LYING ON BACK TO SITTING ON SIDE OF FLAT BED: UNABLE
DAILY ACTIVITIY SCORE: 16
PERSONAL GROOMING: A LITTLE
MOVING TO AND FROM BED TO CHAIR: UNABLE
SUGGESTED CMS G CODE MODIFIER DAILY ACTIVITY: CK
HELP NEEDED FOR BATHING: A LOT
STANDING UP FROM CHAIR USING ARMS: A LOT
SUGGESTED CMS G CODE MODIFIER MOBILITY: CL

## 2024-01-17 ASSESSMENT — GAIT ASSESSMENTS
DISTANCE (FEET): 1
ASSISTIVE DEVICE: FRONT WHEEL WALKER
GAIT LEVEL OF ASSIST: MINIMAL ASSIST

## 2024-01-17 ASSESSMENT — PAIN DESCRIPTION - PAIN TYPE
TYPE: ACUTE PAIN

## 2024-01-17 ASSESSMENT — ACTIVITIES OF DAILY LIVING (ADL): TOILETING: INDEPENDENT

## 2024-01-17 NOTE — PROGRESS NOTES
[facility] Medicine Daily Progress Note    Date of Service  1/17/2024    Chief Complaint  April Alicia is a 63 y.o. female admitted 1/11/2024 with recurrent admissions for chronically infected left knee arthroplasty who has failed multiple antibiotic spacers and multiple prolonged courses of IV antibiotics who was discharged from our hospital to [facility] on January 9, 2024 with no PICC line and possible IV daptomycin.  Patient returns from skilled nursing facility after being there 1 day with fevers and confusion.  Upon readmission from the ER during this hospital admission she was found to be COVID-positive but not hypoxic.  Her confusion is slightly improved and she has been admitted to the general medical floor and deferred surgery until January 16, 2024, which is her original surgical date.  She has been placed back on daptomycin and blood cultures which have been repeated are pending.  Per PT she is ambulating somewhat decently well but will remain in the hospital to her surgery will be done on Tuesday, January 16.    Hospital Course  See above    Interval Problem Update  Patient was seen and examined at bedside.  I have personally reviewed and interpreted vitals, labs, and imaging.    1/16.  Afebrile.  Mild hypertension.  On room air.  Tolerated left above-knee amputation this morning.  Currently states pain is controlled.  Still feels sedated from anesthesia.  Discussed plan of care with patient and family at bedside.  Discussed with ID who will see tomorrow.  Continue daptomycin for now  1/17.  Afebrile.  Mild hypertension.  On room air.  Patient is lethargic and disoriented.  Feels like she just got out of surgery which was yesterday.  Complains of headache and pain from AKA.  PT/OT recommended SNF.  Discussed with ID.  Okay to discontinue antibiotics.    I have discussed this patient's plan of care and discharge plan at IDT rounds today with Case Management, Nursing, Nursing leadership, and  other members of the IDT team.    Consultants/Specialty  ID  Orthopedics    Code Status  DNAR/DNI    Disposition  The patient is not medically cleared for discharge to home or a post-acute facility.  Anticipate discharge to: skilled nursing facility    I have placed the appropriate orders for post-discharge needs.    Review of Systems  Review of Systems   Constitutional:  Negative for fever and malaise/fatigue.        Near her baseline now, 1/14   Gastrointestinal:  Negative for abdominal pain, nausea and vomiting.   Musculoskeletal:  Positive for joint pain and myalgias.        Pain levels remain tolerable   Neurological:  Positive for weakness.   Psychiatric/Behavioral:  The patient is nervous/anxious (similar levels noted 1/15).         Physical Exam  Temp:  [36.1 °C (96.9 °F)-37 °C (98.6 °F)] 37 °C (98.6 °F)  Pulse:  [61-96] 85  Resp:  [13-20] 18  BP: (105-177)/(61-96) 105/61  SpO2:  [96 %-100 %] 96 %    Physical Exam  Vitals and nursing note reviewed.   Constitutional:       General: She is not in acute distress.     Appearance: She is well-developed.      Comments: Mildly anxious appearing today   HENT:      Head: Normocephalic and atraumatic.      Mouth/Throat:      Pharynx: No oropharyngeal exudate.   Eyes:      General: No scleral icterus.     Pupils: Pupils are equal, round, and reactive to light.   Neck:      Thyroid: No thyromegaly.   Cardiovascular:      Rate and Rhythm: Normal rate and regular rhythm.      Heart sounds: Normal heart sounds. No murmur heard.  Pulmonary:      Effort: Pulmonary effort is normal. No respiratory distress.      Breath sounds: Normal breath sounds. No wheezing.   Abdominal:      General: Bowel sounds are normal. There is no distension.      Palpations: Abdomen is soft.      Tenderness: There is no abdominal tenderness.   Musculoskeletal:         General: Swelling and tenderness present. Normal range of motion.      Cervical back: Normal range of motion and neck supple.       Left lower leg: Edema present.      Comments: R knee surgical scar is C/D/I  L knee with swelling and erythema, limited ROM and surgical scar which is well healed, TTP appreciated  No other changes noted 1/15   Skin:     General: Skin is warm and dry.      Findings: No rash.   Neurological:      Mental Status: She is alert and oriented to person, place, and time.      Cranial Nerves: No cranial nerve deficit.         Fluids    Intake/Output Summary (Last 24 hours) at 1/17/2024 0510  Last data filed at 1/16/2024 1950  Gross per 24 hour   Intake 1960 ml   Output 100 ml   Net 1860 ml         Laboratory                              Imaging  US-EXTREMITY VENOUS LOWER UNILAT LEFT   Final Result      DX-CHEST-PORTABLE (1 VIEW)   Final Result      Cardiomegaly and mild interstitial opacity/edema.      No significant consolidation or pleural effusion.           Assessment/Plan  * Septic arthritis (HCC)- (present on admission)  Assessment & Plan  1/16/2024  Patient is being followed by Ortho before and planning for surgery as outpatient  Patient came with fever  Ortho was consulted and planning for OR and I&D during this admission  Continue daptomycin, CPK is ok, blood cxs ngtd  Surgery to be performed 1/16  Keep in house  US LE venous duplex negative for DVT  WBC is normal, afebrile  Status post left above-knee amputation 1/16    Personally reviewed the blood cultures 1/15      Anemia- (present on admission)  Assessment & Plan  1/16/2024  AOCD  Near her baseline  Follow    ACP (advance care planning)- (present on admission)  Assessment & Plan  1/16/2024  Confirmed DNR/DNI    COVID-19- (present on admission)  Assessment & Plan  1/16/2024  No respiratory failure  Patient is on room air  Resolved    Sepsis (HCC)- (present on admission)  Assessment & Plan  1/16/2024  resolved    Sleep apnea- (present on admission)  Assessment & Plan  1/16/2024  CPAP at night    Type 2 diabetes mellitus with diabetic neuropathy, unspecified  (HCC)- (present on admission)  Assessment & Plan  1/16/2024  A1c 5.7  Continue ISS, BS's remain mildly elevated, continue for now for planned surgery    Fever- (present on admission)  Assessment & Plan  1/16/2024  Likely related to covid, but high risk for bacteremia  Defer PICC line until blood cultures result (may not need further antibiotics depending on level of amputation)  NO decadron at this time for covid  Afebrile for several days now    Hypomagnesemia- (present on admission)  Assessment & Plan  1/16/2024  Monitor     Metabolic encephalopathy- (present on admission)  Assessment & Plan  1/16/2024  Likely related to fever and sepsis   Resolved    Paroxysmal atrial fibrillation, hx of- (present on admission)  Assessment & Plan  1/16/2024  No RVR  Continue metoprolol and losartan  Patient is not on anticoagulation before    Hypertension- (present on admission)  Assessment & Plan  1/16/2024  Continue lasix 20 mg daily, losartan 100 mg daily, toprol XL 25 mg daily    Thrombocytopenia (HCC)- (present on admission)  Assessment & Plan  1/16/2024  Mild, trend    Diabetic polyneuropathy associated with type 2 diabetes mellitus (HCC)- (present on admission)  Assessment & Plan  1/16/2024  Continue gabapentin         VTE prophylaxis: Hold anticoagulation with surgery today.    I have performed a physical exam and reviewed and updated ROS and Plan today (1/17/2024). In review of yesterday's note (1/16/2024), there are no changes except as documented above.    Greater than 51 minutes spent prepping to see patient (e.g. review of tests) obtaining and/or reviewing separately obtained history. Performing a medically appropriate examination and/ evaluation.  Counseling and educating the patient/family/caregiver.  Ordering medications, tests, or procedures.  Referring and communicating with other health care professionals.  Documenting clinical information in EPIC.  Independently interpreting results and communicating results  to patient/family/caregiver.  Care coordination.

## 2024-01-17 NOTE — CARE PLAN
The patient is Stable - Low risk of patient condition declining or worsening    Shift Goals  Clinical Goals: Pain control at comfort level 5/10 within 12 hour shift  Patient Goals: Pain control, Rest  Family Goals: EDMUND    Progress made toward(s) clinical / shift goals:  Reported pain level between 5-8/10, medicated as per MAR, verbalized phantom limb pain and sensation. Dressing to LLE remain CDI, with no noted bleeding/drainage under bandage, kept elevated with pillows. Call bell placed within easy reach, safety precautions observed.       Patient is not progressing towards the following goals:    Problem: Pain - Standard  Goal: Alleviation of pain or a reduction in pain to the patient’s comfort goal  Description: Target End Date:  Prior to discharge or change in level of care    Document on Vitals flowsheet    1.  Document pain using the appropriate pain scale per order or unit policy  2.  Educate and implement non-pharmacologic comfort measures (i.e. relaxation, distraction, massage, cold/heat therapy, etc.)  3.  Pain management medications as ordered  4.  Reassess pain after pain med administration per policy  5.  If opiods administered assess patient's response to pain medication is appropriate per POSS sedation scale  6.  Follow pain management plan developed in collaboration with patient and interdisciplinary team (including palliative care or pain specialists if applicable)  Outcome: Not Progressing

## 2024-01-17 NOTE — CARE PLAN
The patient is Stable - Low risk of patient condition declining or worsening    Shift Goals  Clinical Goals: get patient at edge of bed for meals  Patient Goals: rest comfortably  Family Goals: EDMUND    Progress made toward(s) clinical / shift goals:  Patient worked with PT and OT during the shift to mobilize and called appropriately.       Problem: Knowledge Deficit - Standard  Goal: Patient and family/care givers will demonstrate understanding of plan of care, disease process/condition, diagnostic tests and medications  Outcome: Progressing  Note: Patient will understand plan of care by the end of the shift.     Problem: Pain - Standard  Goal: Alleviation of pain or a reduction in pain to the patient’s comfort goal  Outcome: Progressing  Flowsheets (Taken 1/17/2024 1525)  Pain Rating Scale (NPRS): 9  Note: Patient will alert RN if experiencing any discomfort so that intervention can be taken by the end of the shift.

## 2024-01-17 NOTE — PROGRESS NOTES
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
## 2024-01-17 NOTE — THERAPY
Occupational Therapy   Initial Evaluation     Patient Name: April Alicia  Age:  63 y.o., Sex:  female  Medical Record #: [id number]  Today's Date: 1/17/2024     Precautions: (P) Fall Risk, Non Weight Bearing Left Lower Extremity  Comments: (P) s/p L AKA 1/16    Assessment  Patient is 63 y.o. female seen s/p L AKA 1/16 with hx of recurrent admissions for chronically infected L TKA, failed multiple antibiotic spacers and prolonged courses of antibiotics now with COVID and confusion PMHx includes but not limited to alcoholic cirrhosis, diabetes, HTN.    Pt seen for OT evaluation, primarily limited by impaired cognition including poor insight, poor executive functioning skills during functional tasks, weakness, impaired balance, and limited activity tolerance. Attempted to provide education on positioning/desensitization however pt not internalizing education and will need reinforcement. Recommend placement at this time. Will follow for skilled OT services.    Plan    Occupational Therapy Initial Treatment Plan   Treatment Interventions: (P) Self Care / Activities of Daily Living, Cognitive Skill Development, Neuro Re-Education / Balance, Therapeutic Exercises, Therapeutic Activity  Treatment Frequency: (P) 3 Times per Week  Duration: (P) Until Therapy Goals Met    DC Equipment Recommendations: (P) Unable to determine at this time  Discharge Recommendations: (P) Recommend post-acute placement for additional occupational therapy services prior to discharge home        01/17/24 1112   Prior Living Situation   Housing / Facility 1 Story Apartment / Condo   Bathroom Set up Walk In Shower;Shower Chair   Equipment Owned Front-Wheel Walker;4-Wheel Walker;Single Point Cane;Wheelchair;Tub / Shower Seat   Lives with - Patient's Self Care Capacity Significant Other   Comments Lives with SO? who works during the day   Prior Level of ADL Function   Self Feeding Independent   Grooming / Hygiene Independent   Bathing  "Independent   Dressing Independent   Toileting Independent   Prior Level of IADL Function   Medication Management Independent   Laundry Independent   Kitchen Mobility Independent   Finances Independent   Home Management Independent   Shopping Requires Assist   Precautions   Precautions Fall Risk;Non Weight Bearing Left Lower Extremity   Comments s/p L AKA 1/16   Pain 0 - 10 Group   Therapist Pain Assessment   (pain with mobility but agreeable)   Cognition    Cognition / Consciousness X   Speech/ Communication Hard of Hearing   Safety Awareness Impaired   New Learning Impaired   Comments Pt agreeable with encouragement, poor insight, distracted by pain, and not wanting to \"look at\" her leg   Active ROM Upper Body   Active ROM Upper Body  X   Comments L shoulder ROM limited, otherwise WFL   Strength Upper Body   Upper Body Strength  X   Gross Strength Generalized Weakness, Equal Bilaterally.    Balance Assessment   Sitting Balance (Static) Fair   Sitting Balance (Dynamic) Fair -   Standing Balance (Static) Poor +   Weight Shift Sitting Fair   Bed Mobility    Supine to Sit Moderate Assist   Sit to Supine Moderate Assist   Scooting Standby Assist   ADL Assessment   Grooming Standby Assist   Lower Body Dressing Maximal Assist   Toileting Maximal Assist   How much help from another person does the patient currently need...   6 Clicks Daily Activity Score 16   Functional Mobility   Sit to Stand Maximal Assist   Patient / Family Goals   Patient / Family Goal #1 to lay down   Short Term Goals   Short Term Goal # 1 Pt will demo BSC txf SPV   Short Term Goal # 2 Pt will complete LB dressing SPV   Short Term Goal # 3 Pt will tolerate sitting EOB for G/H routine SPV   Education Group   Role of Occupational Therapist Patient Response Patient;Acceptance;Explanation   Occupational Therapy Initial Treatment Plan    Treatment Interventions Self Care / Activities of Daily Living;Cognitive Skill Development;Neuro Re-Education / " Balance;Therapeutic Exercises;Therapeutic Activity   Treatment Frequency 3 Times per Week   Duration Until Therapy Goals Met   Problem List   Problem List Decreased Active Daily Living Skills;Decreased Homemaking Skills;Decreased Activity Tolerance;Decreased Functional Mobility;Safety Awareness Deficits / Cognition;Impaired Postural Control / Balance   Anticipated Discharge Equipment and Recommendations   DC Equipment Recommendations Unable to determine at this time   Discharge Recommendations Recommend post-acute placement for additional occupational therapy services prior to discharge home

## 2024-01-17 NOTE — PROGRESS NOTES
"      Orthopaedic Progress Note    Interval changes:  Patient doing well post op  LLE dressings are CDI  Cleared for DC to rehab by ortho pending medicine clearance    ROS - Patient denies any new issues.  Pain well controlled.    /71   Pulse 84   Temp 36.5 °C (97.7 °F) (Temporal)   Resp 16   Ht 1.727 m (5' 8\")   Wt 85.3 kg (188 lb)   SpO2 97%     Patient seen and examined  No acute distress  Breathing non labored  RRR  Left AKA dressings CDI, DNVI, moves all toes, cap refill <2 sec.    Recent Labs     01/17/24  0946   WBC 5.8   RBC 3.59*   HEMOGLOBIN 9.9*   HEMATOCRIT 29.8*   MCV 83.0   MCH 27.6   MCHC 33.2   RDW 49.1   PLATELETCT 72*   MPV 10.4       Active Hospital Problems    Diagnosis     Hypertension [I10]      Priority: Medium    Thrombocytopenia (HCC) [D69.6]      Priority: Low    Diabetic polyneuropathy associated with type 2 diabetes mellitus (HCC) [E11.42]      Priority: Low     - Continue gabapentin for patient's neuropathic symptoms.      Anemia [D64.9]     Sepsis (HCC) [A41.9]     COVID-19 [U07.1]     ACP (advance care planning) [Z71.89]     Septic arthritis (HCC) [M00.9]     Fever [R50.9]     Hypomagnesemia [E83.42]     Type 2 diabetes mellitus with diabetic neuropathy, unspecified (HCC) [E11.40]     Metabolic encephalopathy [G93.41]     Hyperlipidemia [E78.5]     Paroxysmal atrial fibrillation, hx of [I48.0]     Sleep apnea [G47.30]        Assessment/Plan:  Patient doing well post op  LLE dressings are CDI  Cleared for DC to rehab by ortho pending medicine clearance  POD#1 S/P:  1.  Left transfemoral amputation.  2.  Removal of nonabsorbable antibiotic drug delivery device from the intramedullary left femur.  Wt bearing status - NWB LLE  Wound care/Drains - Dressings to be changed every other day by nursing. Or PRN for saturation starting POD#2  Future Procedures - none planned   Lovenox: Start 1/11, Duration-until ambulatory > 150'  Sutures/Staples out- 14-21 days post operatively. " Removal will completed by ortho mid levels only.  PT/OT-initiated  Antibiotics: cubicin 700mg IV qd  DVT Prophylaxis- TEDS/SCDs/Foot pumps  Amaro-not needed per ortho  Case Coordination for Discharge Planning - Disposition per therapy recs.

## 2024-01-17 NOTE — THERAPY
"Physical Therapy   Re Evaluation     Patient Name: April Alicia  Age:  63 y.o., Sex:  female  Medical Record #: [id number]  Today's Date: 1/17/2024     Precautions  Precautions: Fall Risk  Comments: s/p L AKA 1/16    Assessment  Patient is 63 y.o. female initially evaluated 1/12/2024, who is now s/p left AKA.  Prior mobility and home set up remain the same.  Attempted educated regarding positioning/therex for LLE, pt not receptive.  Max assist to sit and to stand.  Once standing, she is able to maintain her balance w/ a fww.  Attempted ambulated, unable to unweight her RLE off the floor, but she is able to \"scoot\" her right foot sideways for about one foot.  PT goals have been modified as well as her frequency increased.  PT will follow and address impairments noted below.  Plan    Physical Therapy Initial Treatment Plan   Treatment Plan : Bed Mobility, Gait Training, Therapeutic Activities  Treatment Frequency: 4 Times per Week  Duration: Until Therapy Goals Met    DC Equipment Recommendations: Unable to determine at this time  Discharge Recommendations: Recommend post-acute placement for additional physical therapy services prior to discharge home         Objective       01/17/24 1113   Cognition    Comments Attempted post AKA education ie therex/positioning, pt not receptive   Balance Assessment   Sitting Balance (Static) Fair   Sitting Balance (Dynamic) Fair   Standing Balance (Static) Fair -   Standing Balance (Dynamic) Poor +   Weight Shift Sitting Fair   Weight Shift Standing Poor   Comments w/ fww   Bed Mobility    Supine to Sit Maximal Assist   Sit to Supine Minimal Assist   Gait Analysis   Gait Level Of Assist Minimal Assist   Assistive Device Front Wheel Walker   Distance (Feet) 1   Deviation   (pt able to \"scoot\" her right foot along the floor to her left)   Functional Mobility   Sit to Stand Maximal Assist   Bed, Chair, Wheelchair Transfer Unable to Participate   Short Term Goals    Short Term " Goal # 1 Pt to move supine to/from eob w/ spv in 6 visits   Short Term Goal # 2 Pt to move sit to/from stand w/ spv in 6 visits   Short Term Goal # 3 Pt to TF bed to/from chair/w/c w/ spv in 6 visits   Short Term Goal # 4 Pt to ambulate 10 ft w/ fww and spv in 6 visits   Physical Therapy Initial Treatment Plan    Treatment Plan  Bed Mobility;Gait Training;Therapeutic Activities   Treatment Frequency 4 Times per Week   Duration Until Therapy Goals Met   Problem List    Problems Impaired Bed Mobility;Impaired Transfers;Impaired Ambulation;Impaired Balance;Decreased Activity Tolerance;Safety Awareness Deficits / Cognition   Anticipated Discharge Equipment and Recommendations   DC Equipment Recommendations Unable to determine at this time   Discharge Recommendations Recommend post-acute placement for additional physical therapy services prior to discharge home

## 2024-01-17 NOTE — PROGRESS NOTES
Communication with hospitalist, pt had oxycodone 5 at 3 but her pain has returned, pt states 8/10 and requesting more pain medication. Noted new orders placed, awaiting pharmacy, with nonpharm pt states is doing much better now but still has pain and is scared of pain returning with any movement, bed mobility.

## 2024-01-18 LAB
GLUCOSE BLD STRIP.AUTO-MCNC: 138 MG/DL (ref 65–99)
GLUCOSE BLD STRIP.AUTO-MCNC: 141 MG/DL (ref 65–99)
GLUCOSE BLD STRIP.AUTO-MCNC: 149 MG/DL (ref 65–99)
GLUCOSE BLD STRIP.AUTO-MCNC: 207 MG/DL (ref 65–99)
MAGNESIUM SERPL-MCNC: 1.6 MG/DL (ref 1.5–2.5)
PHOSPHATE SERPL-MCNC: 2.8 MG/DL (ref 2.5–4.5)

## 2024-01-18 PROCEDURE — 36415 COLL VENOUS BLD VENIPUNCTURE: CPT

## 2024-01-18 PROCEDURE — 99233 SBSQ HOSP IP/OBS HIGH 50: CPT | Performed by: INTERNAL MEDICINE

## 2024-01-18 PROCEDURE — A9270 NON-COVERED ITEM OR SERVICE: HCPCS | Performed by: INTERNAL MEDICINE

## 2024-01-18 PROCEDURE — 83735 ASSAY OF MAGNESIUM: CPT

## 2024-01-18 PROCEDURE — 82962 GLUCOSE BLOOD TEST: CPT

## 2024-01-18 PROCEDURE — 700102 HCHG RX REV CODE 250 W/ 637 OVERRIDE(OP): Performed by: INTERNAL MEDICINE

## 2024-01-18 PROCEDURE — 770001 HCHG ROOM/CARE - MED/SURG/GYN PRIV*

## 2024-01-18 PROCEDURE — 84100 ASSAY OF PHOSPHORUS: CPT

## 2024-01-18 PROCEDURE — 700111 HCHG RX REV CODE 636 W/ 250 OVERRIDE (IP): Mod: JZ | Performed by: INTERNAL MEDICINE

## 2024-01-18 RX ORDER — LANOLIN ALCOHOL/MO/W.PET/CERES
400 CREAM (GRAM) TOPICAL 2 TIMES DAILY
Status: COMPLETED | OUTPATIENT
Start: 2024-01-18 | End: 2024-01-20

## 2024-01-18 RX ADMIN — DIBASIC SODIUM PHOSPHATE, MONOBASIC POTASSIUM PHOSPHATE AND MONOBASIC SODIUM PHOSPHATE 500 MG: 852; 155; 130 TABLET ORAL at 09:13

## 2024-01-18 RX ADMIN — GABAPENTIN 600 MG: 300 CAPSULE ORAL at 06:32

## 2024-01-18 RX ADMIN — OXYCODONE HYDROCHLORIDE 10 MG: 10 TABLET ORAL at 13:22

## 2024-01-18 RX ADMIN — DOCUSATE SODIUM 50 MG AND SENNOSIDES 8.6 MG 2 TABLET: 8.6; 5 TABLET, FILM COATED ORAL at 17:03

## 2024-01-18 RX ADMIN — OXYCODONE HYDROCHLORIDE 10 MG: 10 TABLET ORAL at 02:26

## 2024-01-18 RX ADMIN — ENOXAPARIN SODIUM 40 MG: 100 INJECTION SUBCUTANEOUS at 06:28

## 2024-01-18 RX ADMIN — OMEPRAZOLE 20 MG: 20 CAPSULE, DELAYED RELEASE ORAL at 06:26

## 2024-01-18 RX ADMIN — GABAPENTIN 600 MG: 300 CAPSULE ORAL at 13:21

## 2024-01-18 RX ADMIN — OXYCODONE HYDROCHLORIDE 10 MG: 10 TABLET ORAL at 06:41

## 2024-01-18 RX ADMIN — LOSARTAN POTASSIUM 100 MG: 50 TABLET, FILM COATED ORAL at 06:32

## 2024-01-18 RX ADMIN — OXYCODONE HYDROCHLORIDE 10 MG: 10 TABLET ORAL at 21:02

## 2024-01-18 RX ADMIN — TIOTROPIUM BROMIDE INHALATION SPRAY 5 MCG: 3.12 SPRAY, METERED RESPIRATORY (INHALATION) at 09:13

## 2024-01-18 RX ADMIN — Medication 400 MG: at 17:54

## 2024-01-18 RX ADMIN — POLYETHYLENE GLYCOL 3350 1 PACKET: 17 POWDER, FOR SOLUTION ORAL at 06:33

## 2024-01-18 RX ADMIN — LACTULOSE 15 ML: 10 SOLUTION ORAL; RECTAL at 06:33

## 2024-01-18 RX ADMIN — ATORVASTATIN CALCIUM 20 MG: 20 TABLET, FILM COATED ORAL at 21:03

## 2024-01-18 RX ADMIN — INSULIN HUMAN 2 UNITS: 100 INJECTION, SOLUTION PARENTERAL at 17:56

## 2024-01-18 RX ADMIN — OXYCODONE HYDROCHLORIDE 10 MG: 10 TABLET ORAL at 10:33

## 2024-01-18 RX ADMIN — OXYCODONE HYDROCHLORIDE 10 MG: 10 TABLET ORAL at 17:03

## 2024-01-18 RX ADMIN — FUROSEMIDE 20 MG: 20 TABLET ORAL at 06:31

## 2024-01-18 RX ADMIN — DOCUSATE SODIUM 50 MG AND SENNOSIDES 8.6 MG 2 TABLET: 8.6; 5 TABLET, FILM COATED ORAL at 06:31

## 2024-01-18 RX ADMIN — Medication 400 MG: at 06:32

## 2024-01-18 RX ADMIN — GABAPENTIN 600 MG: 300 CAPSULE ORAL at 21:03

## 2024-01-18 ASSESSMENT — ENCOUNTER SYMPTOMS
NAUSEA: 0
ABDOMINAL PAIN: 0
VOMITING: 0
NERVOUS/ANXIOUS: 1
FEVER: 0
WEAKNESS: 1
MYALGIAS: 1

## 2024-01-18 ASSESSMENT — PAIN DESCRIPTION - PAIN TYPE
TYPE: ACUTE PAIN

## 2024-01-18 NOTE — CARE PLAN
The patient is Stable - Low risk of patient condition declining or worsening    Shift Goals  Clinical Goals: Pain control at comfort level 3/10 within 12 hour shift  Patient Goals: Rest, Pain control  Family Goals: EDMUND    Progress made toward(s) clinical / shift goals: Pain well-controlled by pharmacologic regimen, able to sleep at intervals in between administrations. Dressing to LLE remain CDI, with no noted bleeding/drainage under bandage, kept elevated with pillows. Call bell placed within easy reach, safety precautions observed.      Patient is not progressing towards the following goals: N/A

## 2024-01-18 NOTE — DISCHARGE PLANNING
Case Management Discharge Planning    Admission Date: 1/11/2024  GMLOS: 10.1  ALOS: 7    6-Clicks ADL Score: 16  6-Clicks Mobility Score: 10  PT and/or OT Eval ordered: Yes  Post-acute Referrals Ordered: Yes  Post-acute Choice Obtained: Yes  Has referral(s) been sent to post-acute provider:  Yes      Anticipated Discharge Dispo: Discharge Disposition: Discharged to home/self care (01)    DME Needed: No    Action(s) Taken: LMSW updated by [facility]. [facility] accepting pt. [facility] needs ISO bed available for pt and will let this LMSW know once they have one for her.     Escalations Completed: None    Medically Clear: Yes    Next Steps: LMSW to follow for placement.     Barriers to Discharge: Pending Placement    Is the patient up for discharge tomorrow: No

## 2024-01-18 NOTE — PROGRESS NOTES
Hospital Medicine Daily Progress Note    Date of Service  1/18/2024    Chief Complaint  April Alicia is a 63 y.o. female admitted 1/11/2024 with recurrent admissions for chronically infected left knee arthroplasty who has failed multiple antibiotic spacers and multiple prolonged courses of IV antibiotics who was discharged from our hospital to [facility] on January 9, 2024 with no PICC line and possible IV daptomycin.  Patient returns from skilled nursing facility after being there 1 day with fevers and confusion.  Upon readmission from the ER during this hospital admission she was found to be COVID-positive but not hypoxic.  Her confusion is slightly improved and she has been admitted to the general medical floor and deferred surgery until January 16, 2024, which is her original surgical date.  She has been placed back on daptomycin and blood cultures which have been repeated are pending.  Per PT she is ambulating somewhat decently well but will remain in the hospital to her surgery will be done on Tuesday, January 16.    Status post left transfemoral amputation, removal of nonabsorbable antibiotic drug delivery device from the intramedullary left femur on 1/16.  Infectious disease recommended to discontinue antibiotics and monitor amputation site for any evidence of infection.    Hospital Course  See above    Interval Problem Update  Patient was seen and examined at bedside.  I have personally reviewed and interpreted vitals, labs, and imaging.    1/16.  Afebrile.  Mild hypertension.  On room air.  Tolerated left above-knee amputation this morning.  Currently states pain is controlled.  Still feels sedated from anesthesia.  Discussed plan of care with patient and family at bedside.  Discussed with ID who will see tomorrow.  Continue daptomycin for now  1/17.  Afebrile.  Mild hypertension.  On room air.  Patient is lethargic and disoriented.  Feels like she just got out of surgery which was yesterday.   Complains of headache and pain from AKA.  PT/OT recommended SNF.  Discussed with ID.  Okay to discontinue antibiotics.  1/18.  Afebrile.  Stable vitals.  On room air.  Patient more awake today.  She is very hard of hearing.  Denies fevers, chills, chest pain, shortness of breath.  Complains of left lower extremity pain.  Discussed with social work.  Okay for SNF.  Accepted at [facility] awaiting [facility] bed.    I have discussed this patient's plan of care and discharge plan at IDT rounds today with Case Management, Nursing, Nursing leadership, and other members of the IDT team.    Consultants/Specialty  ID  Orthopedics    Code Status  DNAR/DNI    Disposition  The patient is not medically cleared for discharge to home or a post-acute facility.  Anticipate discharge to: skilled nursing facility    I have placed the appropriate orders for post-discharge needs.    Review of Systems  Review of Systems   Constitutional:  Negative for fever and malaise/fatigue.        Near her baseline now, 1/14   Gastrointestinal:  Negative for abdominal pain, nausea and vomiting.   Musculoskeletal:  Positive for joint pain and myalgias.        Pain levels remain tolerable   Neurological:  Positive for weakness.   Psychiatric/Behavioral:  The patient is nervous/anxious (similar levels noted 1/15).         Physical Exam  Temp:  [36.4 °C (97.5 °F)-36.8 °C (98.3 °F)] 36.8 °C (98.3 °F)  Pulse:  [] 94  Resp:  [16-18] 18  BP: (109-132)/(66-85) 109/74  SpO2:  [95 %-99 %] 95 %    Physical Exam  Vitals and nursing note reviewed.   Constitutional:       General: She is not in acute distress.     Appearance: She is well-developed.      Comments: Mildly anxious appearing today   HENT:      Head: Normocephalic and atraumatic.      Mouth/Throat:      Pharynx: No oropharyngeal exudate.   Eyes:      General: No scleral icterus.     Pupils: Pupils are equal, round, and reactive to light.   Neck:      Thyroid: No thyromegaly.   Cardiovascular:      Rate and  Rhythm: Normal rate and regular rhythm.      Heart sounds: Normal heart sounds. No murmur heard.  Pulmonary:      Effort: Pulmonary effort is normal. No respiratory distress.      Breath sounds: Normal breath sounds. No wheezing.   Abdominal:      General: Bowel sounds are normal. There is no distension.      Palpations: Abdomen is soft.      Tenderness: There is no abdominal tenderness.   Musculoskeletal:         General: Swelling and tenderness present. Normal range of motion.      Cervical back: Normal range of motion and neck supple.      Left lower leg: Edema present.      Comments: R knee surgical scar is C/D/I  L knee with swelling and erythema, limited ROM and surgical scar which is well healed, TTP appreciated  No other changes noted 1/15   Skin:     General: Skin is warm and dry.      Findings: No rash.   Neurological:      Mental Status: She is alert and oriented to person, place, and time.      Cranial Nerves: No cranial nerve deficit.         Fluids    Intake/Output Summary (Last 24 hours) at 1/18/2024 0629  Last data filed at 1/17/2024 1929  Gross per 24 hour   Intake 390 ml   Output 900 ml   Net -510 ml         Laboratory  Recent Labs     01/17/24  0946   WBC 5.8   RBC 3.59*   HEMOGLOBIN 9.9*   HEMATOCRIT 29.8*   MCV 83.0   MCH 27.6   MCHC 33.2   RDW 49.1   PLATELETCT 72*   MPV 10.4       Recent Labs     01/17/24  0946   SODIUM 137   POTASSIUM 3.9   CHLORIDE 109   CO2 20   GLUCOSE 184*   BUN 19   CREATININE 0.66   CALCIUM 7.4*                       Imaging  US-EXTREMITY VENOUS LOWER UNILAT LEFT   Final Result      DX-CHEST-PORTABLE (1 VIEW)   Final Result      Cardiomegaly and mild interstitial opacity/edema.      No significant consolidation or pleural effusion.           Assessment/Plan  * Septic arthritis (HCC)- (present on admission)  Assessment & Plan  1/18/2024  Patient is being followed by Ortho before and planning for surgery as outpatient  Patient came with fever  Ortho was consulted and  planning for OR and I&D during this admission  Continue daptomycin, CPK is ok, blood cxs ngtd  Surgery to be performed 1/16  Keep in house  US LE venous duplex negative for DVT  WBC is normal, afebrile  Status post left above-knee amputation 1/16    Anemia- (present on admission)  Assessment & Plan  1/18/2024  AOCD  Near her baseline  Follow    ACP (advance care planning)- (present on admission)  Assessment & Plan  1/18/2024  Confirmed DNR/DNI    COVID-19- (present on admission)  Assessment & Plan  1/18/2024  No respiratory failure  Patient is on room air  Resolved    Sepsis (HCC)- (present on admission)  Assessment & Plan  1/18/2024  resolved    Sleep apnea- (present on admission)  Assessment & Plan  1/18/2024  CPAP at night    Type 2 diabetes mellitus with diabetic neuropathy, unspecified (HCC)- (present on admission)  Assessment & Plan  1/18/2024  A1c 5.7  Continue ISS, BS's remain mildly elevated, continue for now for planned surgery    Fever- (present on admission)  Assessment & Plan  1/18/2024  Likely related to covid, but high risk for bacteremia  Defer PICC line until blood cultures result (may not need further antibiotics depending on level of amputation)  NO decadron at this time for covid  Afebrile for several days now    Hypomagnesemia- (present on admission)  Assessment & Plan  1/18/2024  Monitor     Metabolic encephalopathy- (present on admission)  Assessment & Plan  1/18/2024  Likely related to fever and sepsis   Resolved    Paroxysmal atrial fibrillation, hx of- (present on admission)  Assessment & Plan  1/18/2024  No RVR  Continue metoprolol and losartan  Patient is not on anticoagulation before    Hypertension- (present on admission)  Assessment & Plan  1/18/2024  Continue lasix 20 mg daily, losartan 100 mg daily, toprol XL 25 mg daily    Thrombocytopenia (HCC)- (present on admission)  Assessment & Plan  1/18/2024  Mild, trend    Diabetic polyneuropathy associated with type 2 diabetes mellitus  (HCC)- (present on admission)  Assessment & Plan  1/18/2024  Continue gabapentin         VTE prophylaxis: Lovenox    I have performed a physical exam and reviewed and updated ROS and Plan today (1/18/2024). In review of yesterday's note (1/17/2024), there are no changes except as documented above.    Greater than 52 minutes spent prepping to see patient (e.g. review of tests) obtaining and/or reviewing separately obtained history. Performing a medically appropriate examination and/ evaluation.  Counseling and educating the patient/family/caregiver.  Ordering medications, tests, or procedures.  Referring and communicating with other health care professionals.  Documenting clinical information in EPIC.  Independently interpreting results and communicating results to patient/family/caregiver.  Care coordination.

## 2024-01-19 LAB
ANION GAP SERPL CALC-SCNC: 8 MMOL/L (ref 7–16)
BACTERIA TISS AEROBE CULT: NORMAL
BUN SERPL-MCNC: 18 MG/DL (ref 8–22)
CALCIUM SERPL-MCNC: 7.2 MG/DL (ref 8.5–10.5)
CHLORIDE SERPL-SCNC: 104 MMOL/L (ref 96–112)
CO2 SERPL-SCNC: 22 MMOL/L (ref 20–33)
CREAT SERPL-MCNC: 0.66 MG/DL (ref 0.5–1.4)
ERYTHROCYTE [DISTWIDTH] IN BLOOD BY AUTOMATED COUNT: 48.7 FL (ref 35.9–50)
GFR SERPLBLD CREATININE-BSD FMLA CKD-EPI: 98 ML/MIN/1.73 M 2
GLUCOSE BLD STRIP.AUTO-MCNC: 112 MG/DL (ref 65–99)
GLUCOSE BLD STRIP.AUTO-MCNC: 142 MG/DL (ref 65–99)
GLUCOSE BLD STRIP.AUTO-MCNC: 177 MG/DL (ref 65–99)
GLUCOSE BLD STRIP.AUTO-MCNC: 182 MG/DL (ref 65–99)
GLUCOSE SERPL-MCNC: 131 MG/DL (ref 65–99)
GRAM STN SPEC: NORMAL
HCT VFR BLD AUTO: 26.7 % (ref 37–47)
HGB BLD-MCNC: 8.8 G/DL (ref 12–16)
MAGNESIUM SERPL-MCNC: 1.4 MG/DL (ref 1.5–2.5)
MCH RBC QN AUTO: 27.7 PG (ref 27–33)
MCHC RBC AUTO-ENTMCNC: 33 G/DL (ref 32.2–35.5)
MCV RBC AUTO: 84 FL (ref 81.4–97.8)
PHOSPHATE SERPL-MCNC: 3.7 MG/DL (ref 2.5–4.5)
PLATELET # BLD AUTO: 69 K/UL (ref 164–446)
PLATELETS.RETICULATED NFR BLD AUTO: 2.5 % (ref 0.6–13.1)
PMV BLD AUTO: 9 FL (ref 9–12.9)
POTASSIUM SERPL-SCNC: 3.7 MMOL/L (ref 3.6–5.5)
RBC # BLD AUTO: 3.18 M/UL (ref 4.2–5.4)
SIGNIFICANT IND 70042: NORMAL
SITE SITE: NORMAL
SODIUM SERPL-SCNC: 134 MMOL/L (ref 135–145)
SOURCE SOURCE: NORMAL
WBC # BLD AUTO: 5.2 K/UL (ref 4.8–10.8)

## 2024-01-19 PROCEDURE — 99233 SBSQ HOSP IP/OBS HIGH 50: CPT | Performed by: INTERNAL MEDICINE

## 2024-01-19 PROCEDURE — 85055 RETICULATED PLATELET ASSAY: CPT

## 2024-01-19 PROCEDURE — 700111 HCHG RX REV CODE 636 W/ 250 OVERRIDE (IP): Mod: JZ | Performed by: INTERNAL MEDICINE

## 2024-01-19 PROCEDURE — 770001 HCHG ROOM/CARE - MED/SURG/GYN PRIV*

## 2024-01-19 PROCEDURE — 83735 ASSAY OF MAGNESIUM: CPT

## 2024-01-19 PROCEDURE — A9270 NON-COVERED ITEM OR SERVICE: HCPCS | Performed by: INTERNAL MEDICINE

## 2024-01-19 PROCEDURE — 36415 COLL VENOUS BLD VENIPUNCTURE: CPT

## 2024-01-19 PROCEDURE — 700102 HCHG RX REV CODE 250 W/ 637 OVERRIDE(OP): Performed by: INTERNAL MEDICINE

## 2024-01-19 PROCEDURE — 82962 GLUCOSE BLOOD TEST: CPT

## 2024-01-19 PROCEDURE — 85027 COMPLETE CBC AUTOMATED: CPT

## 2024-01-19 PROCEDURE — 84100 ASSAY OF PHOSPHORUS: CPT

## 2024-01-19 PROCEDURE — 80048 BASIC METABOLIC PNL TOTAL CA: CPT

## 2024-01-19 RX ORDER — MAGNESIUM SULFATE HEPTAHYDRATE 40 MG/ML
2 INJECTION, SOLUTION INTRAVENOUS ONCE
Status: COMPLETED | OUTPATIENT
Start: 2024-01-19 | End: 2024-01-19

## 2024-01-19 RX ORDER — POTASSIUM CHLORIDE 20 MEQ/1
40 TABLET, EXTENDED RELEASE ORAL ONCE
Status: COMPLETED | OUTPATIENT
Start: 2024-01-19 | End: 2024-01-19

## 2024-01-19 RX ADMIN — Medication 400 MG: at 06:07

## 2024-01-19 RX ADMIN — OXYCODONE 5 MG: 5 TABLET ORAL at 13:47

## 2024-01-19 RX ADMIN — OXYCODONE HYDROCHLORIDE 10 MG: 10 TABLET ORAL at 17:29

## 2024-01-19 RX ADMIN — Medication 400 MG: at 17:29

## 2024-01-19 RX ADMIN — METOPROLOL SUCCINATE 25 MG: 25 TABLET, EXTENDED RELEASE ORAL at 20:59

## 2024-01-19 RX ADMIN — ATORVASTATIN CALCIUM 20 MG: 20 TABLET, FILM COATED ORAL at 20:59

## 2024-01-19 RX ADMIN — OXYCODONE HYDROCHLORIDE 10 MG: 10 TABLET ORAL at 08:26

## 2024-01-19 RX ADMIN — MAGNESIUM SULFATE HEPTAHYDRATE 2 G: 2 INJECTION, SOLUTION INTRAVENOUS at 06:08

## 2024-01-19 RX ADMIN — INSULIN HUMAN 1 UNITS: 100 INJECTION, SOLUTION PARENTERAL at 21:00

## 2024-01-19 RX ADMIN — TIOTROPIUM BROMIDE INHALATION SPRAY 5 MCG: 3.12 SPRAY, METERED RESPIRATORY (INHALATION) at 08:26

## 2024-01-19 RX ADMIN — OMEPRAZOLE 20 MG: 20 CAPSULE, DELAYED RELEASE ORAL at 06:05

## 2024-01-19 RX ADMIN — HYDROMORPHONE HYDROCHLORIDE 0.5 MG: 1 INJECTION, SOLUTION INTRAMUSCULAR; INTRAVENOUS; SUBCUTANEOUS at 20:59

## 2024-01-19 RX ADMIN — GABAPENTIN 600 MG: 300 CAPSULE ORAL at 21:11

## 2024-01-19 RX ADMIN — GABAPENTIN 600 MG: 300 CAPSULE ORAL at 06:05

## 2024-01-19 RX ADMIN — INSULIN HUMAN 1 UNITS: 100 INJECTION, SOLUTION PARENTERAL at 17:35

## 2024-01-19 RX ADMIN — DOCUSATE SODIUM 50 MG AND SENNOSIDES 8.6 MG 2 TABLET: 8.6; 5 TABLET, FILM COATED ORAL at 17:28

## 2024-01-19 RX ADMIN — GABAPENTIN 600 MG: 300 CAPSULE ORAL at 13:49

## 2024-01-19 RX ADMIN — OXYCODONE HYDROCHLORIDE 10 MG: 10 TABLET ORAL at 00:34

## 2024-01-19 RX ADMIN — POTASSIUM CHLORIDE 40 MEQ: 1500 TABLET, EXTENDED RELEASE ORAL at 06:04

## 2024-01-19 RX ADMIN — ENOXAPARIN SODIUM 40 MG: 100 INJECTION SUBCUTANEOUS at 06:04

## 2024-01-19 ASSESSMENT — PAIN DESCRIPTION - PAIN TYPE
TYPE: ACUTE PAIN

## 2024-01-19 ASSESSMENT — ENCOUNTER SYMPTOMS
MYALGIAS: 1
WEAKNESS: 1
NERVOUS/ANXIOUS: 1
ABDOMINAL PAIN: 0
FEVER: 0
VOMITING: 0
NAUSEA: 0

## 2024-01-19 NOTE — DISCHARGE PLANNING
Care Transition Team Assessment    Per chart review, pt came from [place] and has an extensive history at [facility] due to complex medical issues. Pt can return to [facility] once [facility] has an ISO bed. Patient is hard of hearing. Prior to pt's stay in the hospital and the skilled nursing facilities, pt resided at [address] [place] [id number] with her daughter Ava who is her DPOA. Pt was ambulating with a cane and a rollator prior to admission. Pt does receive $980 a month in disability and is a SNAP recipient. Pt does have a POLST and an Advanced Directive.       Information Source  Orientation Level: Oriented X4  Information Given By:  (Chart Review)  Who is responsible for making decisions for patient? : Patient    Readmission Evaluation  Is this a readmission?: Yes - unplanned readmission    Elopement Risk  Legal Hold: No  Ambulatory or Self Mobile in Wheelchair: No-Not an Elopement Risk  Disoriented: No  Psychiatric Symptoms: None  History of Wandering: No  Elopement this Admit: No  Vocalizing Wanting to Leave: No  Displays Behaviors, Body Language Wanting to Leave: No-Not at Risk for Elopement  Elopement Risk: Not at Risk for Elopement    Interdisciplinary Discharge Planning  Lives with - Patient's Self Care Capacity: Significant Other  Patient or legal guardian wants to designate a caregiver: No  Support Systems: Children, Spouse / Significant Other  Housing / Facility: 1 Story Apartment / Condo  Prior Services: Intermittent Physical Support for ADL Per Family  Durable Medical Equipment: Walker    Discharge Preparedness  What is your plan after discharge?: Skilled nursing facility  What are your discharge supports?: Child  Prior Functional Level: Needs Assist with Activities of Daily Living  Difficulity with ADLs: Walking, Toileting, Bathing, Dressing  Difficulity with IADLs: Cooking, Driving, Keeping track of finances, Laundry, Managing medication, Shopping    Functional Assesment  Prior Functional Level:  Needs Assist with Activities of Daily Living    Finances  Financial Barriers to Discharge: Yes  Average Monthly Income: 980 $  Source of Income: Social Security Disability  Prescription Coverage: Yes    Vision / Hearing Impairment  Vision Impairment : Yes  Right Eye Vision: Impaired, Wears Glasses  Left Eye Vision: Impaired, Wears Glasses  Hearing Impairment : Yes  Hearing Impairment: Both Ears, Hearing Device Not Available  Does Pt Need Special Equipment for the Hearing Impaired?: No         Advance Directive  Advance Directive?: POLST, DPOA for Health Care    Domestic Abuse  Have you ever been the victim of abuse or violence?: No  Physical Abuse or Sexual Abuse: No  Verbal Abuse or Emotional Abuse: No  Possible Abuse/Neglect Reported to:: Not Applicable    Psychological Assessment  History of Substance Abuse: Alcohol  History of Psychiatric Problems: No  Non-compliant with Treatment: No  Newly Diagnosed Illness: No    Discharge Risks or Barriers  Discharge risks or barriers?: No  Patient risk factors: Complex medical needs, Uninsured or underinsured, Substance abuse    Anticipated Discharge Information  Discharge Disposition: D/T to SNF with Medicare cert in anticipation of skilled care (03)

## 2024-01-19 NOTE — DISCHARGE PLANNING
Case Management Discharge Planning    Admission Date: 1/11/2024  GMLOS: 10.1  ALOS: 8    6-Clicks ADL Score: 16  6-Clicks Mobility Score: 10  PT and/or OT Eval ordered: Yes  Post-acute Referrals Ordered: Yes  Post-acute Choice Obtained: Yes  Has referral(s) been sent to post-acute provider:  Yes      Anticipated Discharge Dispo: Discharge Disposition: Discharged to home/self care (01)    DME Needed: No    Action(s) Taken: LMSW spoke with [facility], [facility] pending ISO bed.     Escalations Completed: None    Medically Clear: Yes    Next Steps: LMSW to follow with [facility] for placement.     Barriers to Discharge: Pending Placement    Is the patient up for discharge tomorrow: No

## 2024-01-19 NOTE — PROGRESS NOTES
Hospital Medicine Daily Progress Note    Date of Service  1/19/2024    Chief Complaint  April Alicia is a 63 y.o. female admitted 1/11/2024 with recurrent admissions for chronically infected left knee arthroplasty who has failed multiple antibiotic spacers and multiple prolonged courses of IV antibiotics who was discharged from our hospital to [facility] on January 9, 2024 with no PICC line and possible IV daptomycin.  Patient returns from skilled nursing facility after being there 1 day with fevers and confusion.  Upon readmission from the ER during this hospital admission she was found to be COVID-positive but not hypoxic.  Her confusion is slightly improved and she has been admitted to the general medical floor and deferred surgery until January 16, 2024, which is her original surgical date.  She has been placed back on daptomycin and blood cultures which have been repeated are pending.  Per PT she is ambulating somewhat decently well but will remain in the hospital to her surgery will be done on Tuesday, January 16.    Status post left transfemoral amputation, removal of nonabsorbable antibiotic drug delivery device from the intramedullary left femur on 1/16.  Infectious disease recommended to discontinue antibiotics and monitor amputation site for any evidence of infection.    Hospital Course  See above    Interval Problem Update  Patient was seen and examined at bedside.  I have personally reviewed and interpreted vitals, labs, and imaging.    1/16.  Afebrile.  Mild hypertension.  On room air.  Tolerated left above-knee amputation this morning.  Currently states pain is controlled.  Still feels sedated from anesthesia.  Discussed plan of care with patient and family at bedside.  Discussed with ID who will see tomorrow.  Continue daptomycin for now  1/17.  Afebrile.  Mild hypertension.  On room air.  Patient is lethargic and disoriented.  Feels like she just got out of surgery which was yesterday.   Complains of headache and pain from AKA.  PT/OT recommended SNF.  Discussed with ID.  Okay to discontinue antibiotics.  1/18.  Afebrile.  Stable vitals.  On room air.  Patient more awake today.  She is very hard of hearing.  Denies fevers, chills, chest pain, shortness of breath.  Complains of left lower extremity pain.  Discussed with social work.  Okay for SNF.  Accepted at [facility] awaiting [facility] bed.  1/19.  Afebrile.  Stable vitals.  On room air.  Replete mag, K.  Denies fever, chills, chest pains, shortness of breath.  Complains of left lower extremity pain.  Counseled to stay on top of pain meds rather than trying to tough it out and catch up.  Hgb 11.7 > 9.9 > 8.8  P 72 > 69  Wbc 5.2    I have discussed this patient's plan of care and discharge plan at IDT rounds today with Case Management, Nursing, Nursing leadership, and other members of the IDT team.    Consultants/Specialty  ID  Orthopedics    Code Status  DNAR/DNI    Disposition  The patient is not medically cleared for discharge to home or a post-acute facility.  Anticipate discharge to: skilled nursing facility    I have placed the appropriate orders for post-discharge needs.    Review of Systems  Review of Systems   Constitutional:  Negative for fever and malaise/fatigue.        Near her baseline now, 1/14   Gastrointestinal:  Negative for abdominal pain, nausea and vomiting.   Musculoskeletal:  Positive for joint pain and myalgias.        Pain levels remain tolerable   Neurological:  Positive for weakness.   Psychiatric/Behavioral:  The patient is nervous/anxious (similar levels noted 1/15).         Physical Exam  Temp:  [37.1 °C (98.8 °F)-37.4 °C (99.4 °F)] 37.3 °C (99.2 °F)  Pulse:  [81-92] 85  Resp:  [16-20] 16  BP: ()/(53-72) 96/53  SpO2:  [95 %-99 %] 96 %    Physical Exam  Vitals and nursing note reviewed.   Constitutional:       General: She is not in acute distress.     Appearance: She is well-developed.      Comments: Mildly anxious  appearing today   HENT:      Head: Normocephalic and atraumatic.      Mouth/Throat:      Pharynx: No oropharyngeal exudate.   Eyes:      General: No scleral icterus.     Pupils: Pupils are equal, round, and reactive to light.   Neck:      Thyroid: No thyromegaly.   Cardiovascular:      Rate and Rhythm: Normal rate and regular rhythm.      Heart sounds: Normal heart sounds. No murmur heard.  Pulmonary:      Effort: Pulmonary effort is normal. No respiratory distress.      Breath sounds: Normal breath sounds. No wheezing.   Abdominal:      General: Bowel sounds are normal. There is no distension.      Palpations: Abdomen is soft.      Tenderness: There is no abdominal tenderness.   Musculoskeletal:         General: Swelling and tenderness present. Normal range of motion.      Cervical back: Normal range of motion and neck supple.      Left lower leg: Edema present.      Comments: R knee surgical scar is C/D/I  L knee with swelling and erythema, limited ROM and surgical scar which is well healed, TTP appreciated  No other changes noted 1/15   Skin:     General: Skin is warm and dry.      Findings: No rash.   Neurological:      Mental Status: She is alert and oriented to person, place, and time.      Cranial Nerves: No cranial nerve deficit.         Fluids    Intake/Output Summary (Last 24 hours) at 1/19/2024 0514  Last data filed at 1/18/2024 1000  Gross per 24 hour   Intake 425.01 ml   Output 1000 ml   Net -574.99 ml         Laboratory  Recent Labs     01/17/24  0946 01/19/24  0149   WBC 5.8 5.2   RBC 3.59* 3.18*   HEMOGLOBIN 9.9* 8.8*   HEMATOCRIT 29.8* 26.7*   MCV 83.0 84.0   MCH 27.6 27.7   MCHC 33.2 33.0   RDW 49.1 48.7   PLATELETCT 72* 69*   MPV 10.4 9.0       Recent Labs     01/17/24  0946 01/19/24  0149   SODIUM 137 134*   POTASSIUM 3.9 3.7   CHLORIDE 109 104   CO2 20 22   GLUCOSE 184* 131*   BUN 19 18   CREATININE 0.66 0.66   CALCIUM 7.4* 7.2*                       Imaging  US-EXTREMITY VENOUS LOWER UNILAT  LEFT   Final Result      DX-CHEST-PORTABLE (1 VIEW)   Final Result      Cardiomegaly and mild interstitial opacity/edema.      No significant consolidation or pleural effusion.           Assessment/Plan  * Septic arthritis (HCC)- (present on admission)  Assessment & Plan  1/19/2024  Patient is being followed by Ortho before and planning for surgery as outpatient  Patient came with fever  Ortho was consulted and planning for OR and I&D during this admission  Continue daptomycin, CPK is ok, blood cxs ngtd  Surgery to be performed 1/16  Keep in house  US LE venous duplex negative for DVT  WBC is normal, afebrile  Status post left above-knee amputation 1/16    Anemia- (present on admission)  Assessment & Plan  1/19/2024  AOCD  Near her baseline  Follow    ACP (advance care planning)- (present on admission)  Assessment & Plan  1/19/2024  Confirmed DNR/DNI    COVID-19- (present on admission)  Assessment & Plan  1/19/2024  No respiratory failure  Patient is on room air  Resolved    Sepsis (HCC)- (present on admission)  Assessment & Plan  1/19/2024  resolved    Sleep apnea- (present on admission)  Assessment & Plan  1/19/2024  CPAP at night    Type 2 diabetes mellitus with diabetic neuropathy, unspecified (HCC)- (present on admission)  Assessment & Plan  1/19/2024  A1c 5.7  Continue ISS, BS's remain mildly elevated, continue for now for planned surgery    Fever- (present on admission)  Assessment & Plan  1/19/2024  Likely related to covid, but high risk for bacteremia  Defer PICC line until blood cultures result (may not need further antibiotics depending on level of amputation)  NO decadron at this time for covid  Afebrile for several days now    Hypomagnesemia- (present on admission)  Assessment & Plan  1/19/2024  Monitor     Metabolic encephalopathy- (present on admission)  Assessment & Plan  1/19/2024  Likely related to fever and sepsis   Resolved    Paroxysmal atrial fibrillation, hx of- (present on  admission)  Assessment & Plan  1/19/2024  No RVR  Continue metoprolol and losartan  Patient is not on anticoagulation before    Hypertension- (present on admission)  Assessment & Plan  1/19/2024  Continue lasix 20 mg daily, losartan 100 mg daily, toprol XL 25 mg daily    Thrombocytopenia (HCC)- (present on admission)  Assessment & Plan  1/19/2024  Mild, trend    Diabetic polyneuropathy associated with type 2 diabetes mellitus (HCC)- (present on admission)  Assessment & Plan  1/19/2024  Continue gabapentin         VTE prophylaxis: Lovenox    I have performed a physical exam and reviewed and updated ROS and Plan today (1/19/2024). In review of yesterday's note (1/18/2024), there are no changes except as documented above.    Greater than 51 minutes spent prepping to see patient (e.g. review of tests) obtaining and/or reviewing separately obtained history. Performing a medically appropriate examination and/ evaluation.  Counseling and educating the patient/family/caregiver.  Ordering medications, tests, or procedures.  Referring and communicating with other health care professionals.  Documenting clinical information in EPIC.  Independently interpreting results and communicating results to patient/family/caregiver.  Care coordination.

## 2024-01-19 NOTE — PROGRESS NOTES
"      Orthopaedic Progress Note    Interval changes:  Patient doing well    LLE dressings are CDI  Cleared for DC to rehab by ortho pending medicine clearance    ROS - Patient denies any new issues.  Pain well controlled.    /72   Pulse 92   Temp 37.3 °C (99.2 °F) (Oral)   Resp 18   Ht 1.727 m (5' 8\")   Wt 85.3 kg (188 lb)   SpO2 98%     Patient seen and examined  No acute distress  Breathing non labored  RRR  Left AKA dressings CDI, DNVI, moves all toes, cap refill <2 sec.    Recent Labs     01/17/24  0946   WBC 5.8   RBC 3.59*   HEMOGLOBIN 9.9*   HEMATOCRIT 29.8*   MCV 83.0   MCH 27.6   MCHC 33.2   RDW 49.1   PLATELETCT 72*   MPV 10.4       Active Hospital Problems    Diagnosis     Hypertension [I10]      Priority: Medium    Thrombocytopenia (HCC) [D69.6]      Priority: Low    Diabetic polyneuropathy associated with type 2 diabetes mellitus (HCC) [E11.42]      Priority: Low     - Continue gabapentin for patient's neuropathic symptoms.      Anemia [D64.9]     Sepsis (HCC) [A41.9]     COVID-19 [U07.1]     ACP (advance care planning) [Z71.89]     Septic arthritis (HCC) [M00.9]     Fever [R50.9]     Hypomagnesemia [E83.42]     Type 2 diabetes mellitus with diabetic neuropathy, unspecified (HCC) [E11.40]     Metabolic encephalopathy [G93.41]     Hyperlipidemia [E78.5]     Paroxysmal atrial fibrillation, hx of [I48.0]     Sleep apnea [G47.30]        Assessment/Plan:  Patient doing well    LLE dressings are CDI  Cleared for DC to rehab by ortho pending medicine clearance  POD#2 S/P:  1.  Left transfemoral amputation.  2.  Removal of nonabsorbable antibiotic drug delivery device from the intramedullary left femur.  Wt bearing status - NWB LLE  Wound care/Drains - Dressings to be changed every other day by nursing. Or PRN for saturation starting POD#2  Future Procedures - none planned   Lovenox: Start 1/11, Duration-until ambulatory > 150'  Sutures/Staples out- 14-21 days post operatively. Removal will " completed by ortho mid levels only.  PT/OT-initiated  Antibiotics: cubicin 700mg IV qd  DVT Prophylaxis- TEDS/SCDs/Foot pumps  Amaro-not needed per ortho  Case Coordination for Discharge Planning - Disposition per therapy recs.

## 2024-01-19 NOTE — PROGRESS NOTES
"      Orthopaedic Progress Note    Interval changes:  Patient doing well    LLE dressings changed incision without issues  Cleared for DC to rehab by ortho pending medicine clearance    ROS - Patient denies any new issues.  Pain well controlled.    /72   Pulse 67   Temp 37.1 °C (98.8 °F) (Oral)   Resp 16   Ht 1.727 m (5' 8\")   Wt 85.3 kg (188 lb)   SpO2 95%     Patient seen and examined  No acute distress  Breathing non labored  RRR  LLE dressings changed incision without issues, DNVI, moves all toes, cap refill <2 sec.    Recent Labs     01/17/24  0946 01/19/24  0149   WBC 5.8 5.2   RBC 3.59* 3.18*   HEMOGLOBIN 9.9* 8.8*   HEMATOCRIT 29.8* 26.7*   MCV 83.0 84.0   MCH 27.6 27.7   MCHC 33.2 33.0   RDW 49.1 48.7   PLATELETCT 72* 69*   MPV 10.4 9.0       Active Hospital Problems    Diagnosis     Hypertension [I10]      Priority: Medium    Thrombocytopenia (HCC) [D69.6]      Priority: Low    Diabetic polyneuropathy associated with type 2 diabetes mellitus (HCC) [E11.42]      Priority: Low     - Continue gabapentin for patient's neuropathic symptoms.      Anemia [D64.9]     Sepsis (HCC) [A41.9]     COVID-19 [U07.1]     ACP (advance care planning) [Z71.89]     Septic arthritis (HCC) [M00.9]     Fever [R50.9]     Hypomagnesemia [E83.42]     Type 2 diabetes mellitus with diabetic neuropathy, unspecified (HCC) [E11.40]     Metabolic encephalopathy [G93.41]     Hyperlipidemia [E78.5]     Paroxysmal atrial fibrillation, hx of [I48.0]     Sleep apnea [G47.30]        Assessment/Plan:  Patient doing well    LLE dressings changed incision without issues  Cleared for DC to rehab by ortho pending medicine clearance  POD#3 S/P:  1.  Left transfemoral amputation.  2.  Removal of nonabsorbable antibiotic drug delivery device from the intramedullary left femur.  Wt bearing status - NWB LLE  Wound care/Drains - Dressings to be changed every other day by nursing. Or PRN for saturation    Future Procedures - none planned "   Lovenox: Start 1/11, Duration-until ambulatory > 150'  Sutures/Staples out- 14-21 days post operatively. Removal will completed by ortho mid levels only.  PT/OT-initiated  Antibiotics: cubicin 700mg IV qd  DVT Prophylaxis- TEDS/SCDs/Foot pumps  Amaro-not needed per ortho  Case Coordination for Discharge Planning - Disposition per therapy recs.

## 2024-01-19 NOTE — CARE PLAN
The patient is Stable - Low risk of patient condition declining or worsening    Shift Goals  Clinical Goals: Patient's pain will be <4 throughout shift  Patient Goals: Sleep and rest  Family Goals: gisele    Progress made toward(s) clinical / shift goals:      Patient is not progressing towards the following goals:      Problem: Skin Integrity  Goal: Skin integrity is maintained or improved  Description: Target End Date:  Prior to discharge or change in level of care    Document interventions on Skin Risk/Sincere flowsheet groups and corresponding LDA    1.  Assess and monitor skin integrity, appearance and/or temperature  2.  Assess risk factors for impaired skin integrity and/or pressures ulcers  3.  Implement precautions to protect skin integrity in collaboration with interdisciplinary team  4.  Implement pressure ulcer prevention protocol if at risk for skin breakdown  5.  Confirm wound care consult if at risk for skin breakdown  6.  Ensure patient use of pressure relieving devices  (Low air loss bed, waffle overlay, heel protectors, ROHO cushion, etc)  Outcome: Not Progressing     Patient is alert and oriented x4, able to verbalize needs. Patient's pain currently controlled with PRN pain meds, rest and repositioning. Patient with skin tear on the sacral area, cleansed with washcloth with soap and water, left open to air and wound consult has been placed by day shift nurse. Patient's bed alarm is on, bed in low position, call light within reach.

## 2024-01-20 LAB
ANION GAP SERPL CALC-SCNC: 5 MMOL/L (ref 7–16)
BUN SERPL-MCNC: 19 MG/DL (ref 8–22)
CALCIUM SERPL-MCNC: 7.4 MG/DL (ref 8.5–10.5)
CHLORIDE SERPL-SCNC: 105 MMOL/L (ref 96–112)
CO2 SERPL-SCNC: 21 MMOL/L (ref 20–33)
CREAT SERPL-MCNC: 0.6 MG/DL (ref 0.5–1.4)
ERYTHROCYTE [DISTWIDTH] IN BLOOD BY AUTOMATED COUNT: 50.7 FL (ref 35.9–50)
GFR SERPLBLD CREATININE-BSD FMLA CKD-EPI: 100 ML/MIN/1.73 M 2
GLUCOSE BLD STRIP.AUTO-MCNC: 134 MG/DL (ref 65–99)
GLUCOSE BLD STRIP.AUTO-MCNC: 142 MG/DL (ref 65–99)
GLUCOSE BLD STRIP.AUTO-MCNC: 150 MG/DL (ref 65–99)
GLUCOSE BLD STRIP.AUTO-MCNC: 215 MG/DL (ref 65–99)
GLUCOSE SERPL-MCNC: 150 MG/DL (ref 65–99)
HCT VFR BLD AUTO: 26.3 % (ref 37–47)
HGB BLD-MCNC: 8.6 G/DL (ref 12–16)
MAGNESIUM SERPL-MCNC: 1.9 MG/DL (ref 1.5–2.5)
MCH RBC QN AUTO: 27.7 PG (ref 27–33)
MCHC RBC AUTO-ENTMCNC: 32.7 G/DL (ref 32.2–35.5)
MCV RBC AUTO: 84.8 FL (ref 81.4–97.8)
PHOSPHATE SERPL-MCNC: 3.2 MG/DL (ref 2.5–4.5)
PLATELET # BLD AUTO: 83 K/UL (ref 164–446)
PLATELETS.RETICULATED NFR BLD AUTO: 2.4 % (ref 0.6–13.1)
PMV BLD AUTO: 11.7 FL (ref 9–12.9)
POTASSIUM SERPL-SCNC: 4.7 MMOL/L (ref 3.6–5.5)
RBC # BLD AUTO: 3.1 M/UL (ref 4.2–5.4)
SODIUM SERPL-SCNC: 131 MMOL/L (ref 135–145)
WBC # BLD AUTO: 5.2 K/UL (ref 4.8–10.8)

## 2024-01-20 PROCEDURE — 80048 BASIC METABOLIC PNL TOTAL CA: CPT

## 2024-01-20 PROCEDURE — A9270 NON-COVERED ITEM OR SERVICE: HCPCS | Performed by: INTERNAL MEDICINE

## 2024-01-20 PROCEDURE — 85027 COMPLETE CBC AUTOMATED: CPT

## 2024-01-20 PROCEDURE — 770001 HCHG ROOM/CARE - MED/SURG/GYN PRIV*

## 2024-01-20 PROCEDURE — 82962 GLUCOSE BLOOD TEST: CPT

## 2024-01-20 PROCEDURE — 99233 SBSQ HOSP IP/OBS HIGH 50: CPT | Performed by: INTERNAL MEDICINE

## 2024-01-20 PROCEDURE — 700111 HCHG RX REV CODE 636 W/ 250 OVERRIDE (IP): Performed by: INTERNAL MEDICINE

## 2024-01-20 PROCEDURE — 36415 COLL VENOUS BLD VENIPUNCTURE: CPT

## 2024-01-20 PROCEDURE — 84100 ASSAY OF PHOSPHORUS: CPT

## 2024-01-20 PROCEDURE — 85055 RETICULATED PLATELET ASSAY: CPT

## 2024-01-20 PROCEDURE — 700102 HCHG RX REV CODE 250 W/ 637 OVERRIDE(OP): Performed by: INTERNAL MEDICINE

## 2024-01-20 PROCEDURE — 83735 ASSAY OF MAGNESIUM: CPT

## 2024-01-20 RX ORDER — LANOLIN ALCOHOL/MO/W.PET/CERES
400 CREAM (GRAM) TOPICAL 2 TIMES DAILY
Status: COMPLETED | OUTPATIENT
Start: 2024-01-20 | End: 2024-01-21

## 2024-01-20 RX ADMIN — METOPROLOL SUCCINATE 25 MG: 25 TABLET, EXTENDED RELEASE ORAL at 20:11

## 2024-01-20 RX ADMIN — LOSARTAN POTASSIUM 100 MG: 50 TABLET, FILM COATED ORAL at 05:20

## 2024-01-20 RX ADMIN — OXYCODONE HYDROCHLORIDE 10 MG: 10 TABLET ORAL at 05:26

## 2024-01-20 RX ADMIN — LACTULOSE 15 ML: 10 SOLUTION ORAL; RECTAL at 05:20

## 2024-01-20 RX ADMIN — DOCUSATE SODIUM 50 MG AND SENNOSIDES 8.6 MG 2 TABLET: 8.6; 5 TABLET, FILM COATED ORAL at 06:00

## 2024-01-20 RX ADMIN — ATORVASTATIN CALCIUM 20 MG: 20 TABLET, FILM COATED ORAL at 20:11

## 2024-01-20 RX ADMIN — FUROSEMIDE 20 MG: 20 TABLET ORAL at 05:20

## 2024-01-20 RX ADMIN — GABAPENTIN 600 MG: 300 CAPSULE ORAL at 15:03

## 2024-01-20 RX ADMIN — GABAPENTIN 600 MG: 300 CAPSULE ORAL at 20:11

## 2024-01-20 RX ADMIN — Medication 400 MG: at 05:20

## 2024-01-20 RX ADMIN — OXYCODONE HYDROCHLORIDE 10 MG: 10 TABLET ORAL at 18:12

## 2024-01-20 RX ADMIN — OMEPRAZOLE 20 MG: 20 CAPSULE, DELAYED RELEASE ORAL at 05:20

## 2024-01-20 RX ADMIN — TIOTROPIUM BROMIDE INHALATION SPRAY 5 MCG: 3.12 SPRAY, METERED RESPIRATORY (INHALATION) at 08:52

## 2024-01-20 RX ADMIN — OXYCODONE HYDROCHLORIDE 10 MG: 10 TABLET ORAL at 12:41

## 2024-01-20 RX ADMIN — INSULIN HUMAN 2 UNITS: 100 INJECTION, SOLUTION PARENTERAL at 20:12

## 2024-01-20 RX ADMIN — Medication 400 MG: at 18:39

## 2024-01-20 RX ADMIN — ENOXAPARIN SODIUM 40 MG: 100 INJECTION SUBCUTANEOUS at 05:20

## 2024-01-20 RX ADMIN — OXYCODONE HYDROCHLORIDE 10 MG: 10 TABLET ORAL at 08:52

## 2024-01-20 RX ADMIN — HYDROMORPHONE HYDROCHLORIDE 0.5 MG: 1 INJECTION, SOLUTION INTRAMUSCULAR; INTRAVENOUS; SUBCUTANEOUS at 20:17

## 2024-01-20 RX ADMIN — GABAPENTIN 600 MG: 300 CAPSULE ORAL at 05:20

## 2024-01-20 ASSESSMENT — ENCOUNTER SYMPTOMS
ABDOMINAL PAIN: 0
WEAKNESS: 1
FEVER: 0
NAUSEA: 0
NERVOUS/ANXIOUS: 1
MYALGIAS: 1
VOMITING: 0

## 2024-01-20 ASSESSMENT — PAIN DESCRIPTION - PAIN TYPE
TYPE: ACUTE PAIN
TYPE: ACUTE PAIN

## 2024-01-20 NOTE — DISCHARGE PLANNING
I left a message for Petra that patient is off isolation on Sunday, do they have a bed?    I don't think they have anyone in admitting on the weekends.

## 2024-01-20 NOTE — CARE PLAN
The patient is Stable - Low risk of patient condition declining or worsening    Shift Goals  Clinical Goals: increase ambulation  Patient Goals: sleep  Family Goals: gisele    Progress made toward(s) clinical / shift goals:    Problem: Urinary - Renal Perfusion  Goal: Ability to achieve and maintain adequate renal perfusion and functioning will improve  Outcome: Progressing     Problem: Skin Integrity  Goal: Skin integrity is maintained or improved  Outcome: Progressing

## 2024-01-20 NOTE — CARE PLAN
The patient is Stable - Low risk of patient condition declining or worsening    Shift Goals  Clinical Goals: monitor vitals and labs  Patient Goals: sleep  Family Goals: gisele    Progress made toward(s) clinical / shift goals:    Problem: Knowledge Deficit - Standard  Goal: Patient and family/care givers will demonstrate understanding of plan of care, disease process/condition, diagnostic tests and medications  Outcome: Progressing     Patient aaox4, reports pain at incisional site, patient medicated per MAR with sedation score of 0. Dressing cdi on assessment. Call light and personal items within reach, encouraged to use call light for assistance

## 2024-01-20 NOTE — CARE PLAN
The patient is Stable - Low risk of patient condition declining or worsening    Shift Goals  Clinical Goals: Pt will report tolerable pain scale during shift.  Patient Goals: Rest & comfort  Family Goals: gisele    Progress made toward(s) clinical / shift goals:  Patient is able to verbalize her needs and ask for pain medication when needed. This helps maintain pain at tolerable level. She drinks oral fluids and is using a purewick for incont.

## 2024-01-20 NOTE — PROGRESS NOTES
Hospital Medicine Daily Progress Note    Date of Service  1/20/2024    Chief Complaint  April Alicia is a 63 y.o. female admitted 1/11/2024 with recurrent admissions for chronically infected left knee arthroplasty who has failed multiple antibiotic spacers and multiple prolonged courses of IV antibiotics who was discharged from our hospital to [facility] on January 9, 2024 with no PICC line and possible IV daptomycin.  Patient returns from skilled nursing facility after being there 1 day with fevers and confusion.  Upon readmission from the ER during this hospital admission she was found to be COVID-positive but not hypoxic.  Her confusion is slightly improved and she has been admitted to the general medical floor and deferred surgery until January 16, 2024, which is her original surgical date.  She has been placed back on daptomycin and blood cultures which have been repeated are pending.  Per PT she is ambulating somewhat decently well but will remain in the hospital to her surgery will be done on Tuesday, January 16.    Status post left transfemoral amputation, removal of nonabsorbable antibiotic drug delivery device from the intramedullary left femur on 1/16.  Infectious disease recommended to discontinue antibiotics and monitor amputation site for any evidence of infection.    Hospital Course  See above    Interval Problem Update  Patient was seen and examined at bedside.  I have personally reviewed and interpreted vitals, labs, and imaging.    1/16.  Afebrile.  Mild hypertension.  On room air.  Tolerated left above-knee amputation this morning.  Currently states pain is controlled.  Still feels sedated from anesthesia.  Discussed plan of care with patient and family at bedside.  Discussed with ID who will see tomorrow.  Continue daptomycin for now  1/17.  Afebrile.  Mild hypertension.  On room air.  Patient is lethargic and disoriented.  Feels like she just got out of surgery which was yesterday.   Complains of headache and pain from AKA.  PT/OT recommended SNF.  Discussed with ID.  Okay to discontinue antibiotics.  1/18.  Afebrile.  Stable vitals.  On room air.  Patient more awake today.  She is very hard of hearing.  Denies fevers, chills, chest pain, shortness of breath.  Complains of left lower extremity pain.  Discussed with social work.  Okay for SNF.  Accepted at [place] awaiting Iso bed.  1/19.  Afebrile.  Stable vitals.  On room air.  Replete mag, K.  Denies fever, chills, chest pains, shortness of breath.  Complains of left lower extremity pain.  Counseled to stay on top of pain meds rather than trying to tough it out and catch up.  1/20.  Afebrile.  Intermittent hypotension.  On room air.  Replete mag.  Denies fevers, chills, chest pains, shortness of breath.  Right lower extremity pain better controlled.  She would like a bath today.  Awaiting placement for isobed.  She will be off isolation for COVID tomorrow.  Hgb 11.7 > 9.9 > 8.8 > 8.6  Thrombocytopenia 72 > 69 > 83  Wbc 5.2    I have discussed this patient's plan of care and discharge plan at IDT rounds today with Case Management, Nursing, Nursing leadership, and other members of the IDT team.    Consultants/Specialty  ID  Orthopedics    Code Status  DNAR/DNI    Disposition  The patient is not medically cleared for discharge to home or a post-acute facility.  Anticipate discharge to: skilled nursing facility    I have placed the appropriate orders for post-discharge needs.    Review of Systems  Review of Systems   Constitutional:  Negative for fever and malaise/fatigue.        Near her baseline now, 1/14   Gastrointestinal:  Negative for abdominal pain, nausea and vomiting.   Musculoskeletal:  Positive for joint pain and myalgias.        Pain levels remain tolerable   Neurological:  Positive for weakness.   Psychiatric/Behavioral:  The patient is nervous/anxious (similar levels noted 1/15).         Physical Exam  Temp:  [36.9 °C (98.4 °F)-37.1  °C (98.8 °F)] 37.1 °C (98.8 °F)  Pulse:  [67-99] 68  Resp:  [16-18] 18  BP: ()/(53-72) 94/53  SpO2:  [94 %-95 %] 94 %    Physical Exam  Vitals and nursing note reviewed.   Constitutional:       General: She is not in acute distress.     Appearance: She is well-developed.      Comments: Mildly anxious appearing today   HENT:      Head: Normocephalic and atraumatic.      Mouth/Throat:      Pharynx: No oropharyngeal exudate.   Eyes:      General: No scleral icterus.     Pupils: Pupils are equal, round, and reactive to light.   Neck:      Thyroid: No thyromegaly.   Cardiovascular:      Rate and Rhythm: Normal rate and regular rhythm.      Heart sounds: Normal heart sounds. No murmur heard.  Pulmonary:      Effort: Pulmonary effort is normal. No respiratory distress.      Breath sounds: Normal breath sounds. No wheezing.   Abdominal:      General: Bowel sounds are normal. There is no distension.      Palpations: Abdomen is soft.      Tenderness: There is no abdominal tenderness.   Musculoskeletal:         General: Swelling and tenderness present. Normal range of motion.      Cervical back: Normal range of motion and neck supple.      Left lower leg: Edema present.      Comments: R knee surgical scar is C/D/I  L knee with swelling and erythema, limited ROM and surgical scar which is well healed, TTP appreciated  No other changes noted 1/15   Skin:     General: Skin is warm and dry.      Findings: No rash.   Neurological:      Mental Status: She is alert and oriented to person, place, and time.      Cranial Nerves: No cranial nerve deficit.         Fluids    Intake/Output Summary (Last 24 hours) at 1/20/2024 0647  Last data filed at 1/20/2024 0526  Gross per 24 hour   Intake 1080 ml   Output 1600 ml   Net -520 ml         Laboratory  Recent Labs     01/17/24  0946 01/19/24  0149 01/20/24  0039   WBC 5.8 5.2 5.2   RBC 3.59* 3.18* 3.10*   HEMOGLOBIN 9.9* 8.8* 8.6*   HEMATOCRIT 29.8* 26.7* 26.3*   MCV 83.0 84.0 84.8    MCH 27.6 27.7 27.7   MCHC 33.2 33.0 32.7   RDW 49.1 48.7 50.7*   PLATELETCT 72* 69* 83*   MPV 10.4 9.0 11.7       Recent Labs     01/17/24  0946 01/19/24  0149 01/20/24  0039   SODIUM 137 134* 131*   POTASSIUM 3.9 3.7 4.7   CHLORIDE 109 104 105   CO2 20 22 21   GLUCOSE 184* 131* 150*   BUN 19 18 19   CREATININE 0.66 0.66 0.60   CALCIUM 7.4* 7.2* 7.4*                       Imaging  US-EXTREMITY VENOUS LOWER UNILAT LEFT   Final Result      DX-CHEST-PORTABLE (1 VIEW)   Final Result      Cardiomegaly and mild interstitial opacity/edema.      No significant consolidation or pleural effusion.           Assessment/Plan  * Septic arthritis (HCC)- (present on admission)  Assessment & Plan  1/20/2024  Patient is being followed by Ortho before and planning for surgery as outpatient  Patient came with fever  Ortho was consulted and planning for OR and I&D during this admission  Continue daptomycin, CPK is ok, blood cxs ngtd  Surgery to be performed 1/16  Keep in house  US LE venous duplex negative for DVT  WBC is normal, afebrile  Status post left above-knee amputation 1/16  Okay to DC antibiotics per ID    Anemia- (present on admission)  Assessment & Plan  1/20/2024  AOCD  Near her baseline  Follow    ACP (advance care planning)- (present on admission)  Assessment & Plan  1/20/2024  Confirmed DNR/DNI    COVID-19- (present on admission)  Assessment & Plan  1/20/2024  No respiratory failure  Patient is on room air  Resolved    Sepsis (HCC)- (present on admission)  Assessment & Plan  1/20/2024  resolved    Sleep apnea- (present on admission)  Assessment & Plan  1/20/2024  CPAP at night    Type 2 diabetes mellitus with diabetic neuropathy, unspecified (HCC)- (present on admission)  Assessment & Plan  1/20/2024  A1c 5.7  Continue ISS, BS's remain mildly elevated, continue for now for planned surgery    Fever- (present on admission)  Assessment & Plan  1/20/2024  Likely related to covid, but high risk for bacteremia  Defer PICC  line until blood cultures result (may not need further antibiotics depending on level of amputation)  NO decadron at this time for covid  Afebrile for several days now    Hypomagnesemia- (present on admission)  Assessment & Plan  1/20/2024  Monitor     Metabolic encephalopathy- (present on admission)  Assessment & Plan  1/20/2024  Likely related to fever and sepsis   Resolved    Paroxysmal atrial fibrillation, hx of- (present on admission)  Assessment & Plan  1/20/2024  No RVR  Continue metoprolol and losartan  Patient is not on anticoagulation before    Hypertension- (present on admission)  Assessment & Plan  1/20/2024  Continue lasix 20 mg daily, losartan 100 mg daily, toprol XL 25 mg daily    Thrombocytopenia (HCC)- (present on admission)  Assessment & Plan  1/20/2024  Mild, trend    Diabetic polyneuropathy associated with type 2 diabetes mellitus (HCC)- (present on admission)  Assessment & Plan  1/20/2024  Continue gabapentin         VTE prophylaxis: Lovenox    I have performed a physical exam and reviewed and updated ROS and Plan today (1/20/2024). In review of yesterday's note (1/19/2024), there are no changes except as documented above.    Greater than 50 minutes spent prepping to see patient (e.g. review of tests) obtaining and/or reviewing separately obtained history. Performing a medically appropriate examination and/ evaluation.  Counseling and educating the patient/family/caregiver.  Ordering medications, tests, or procedures.  Referring and communicating with other health care professionals.  Documenting clinical information in EPIC.  Independently interpreting results and communicating results to patient/family/caregiver.  Care coordination.

## 2024-01-20 NOTE — PROGRESS NOTES
"      Orthopaedic Progress Note    Interval changes:  Patient doing well    LLE dressings CDI  Cleared for DC to rehab by ortho pending medicine clearance    ROS - Patient denies any new issues.  Pain well controlled.    BP 95/56   Pulse 68   Temp 36.4 °C (97.6 °F) (Temporal)   Resp 18   Ht 1.727 m (5' 8\")   Wt 85.3 kg (188 lb)   SpO2 93%     Patient seen and examined  No acute distress  Breathing non labored  RRR  LLE dressings CDI, DNVI, moves all toes, cap refill <2 sec.    Recent Labs     01/19/24  0149 01/20/24  0039   WBC 5.2 5.2   RBC 3.18* 3.10*   HEMOGLOBIN 8.8* 8.6*   HEMATOCRIT 26.7* 26.3*   MCV 84.0 84.8   MCH 27.7 27.7   MCHC 33.0 32.7   RDW 48.7 50.7*   PLATELETCT 69* 83*   MPV 9.0 11.7       Active Hospital Problems    Diagnosis     Hypertension [I10]      Priority: Medium    Thrombocytopenia (HCC) [D69.6]      Priority: Low    Diabetic polyneuropathy associated with type 2 diabetes mellitus (HCC) [E11.42]      Priority: Low     - Continue gabapentin for patient's neuropathic symptoms.      Anemia [D64.9]     Sepsis (HCC) [A41.9]     COVID-19 [U07.1]     ACP (advance care planning) [Z71.89]     Septic arthritis (HCC) [M00.9]     Fever [R50.9]     Hypomagnesemia [E83.42]     Type 2 diabetes mellitus with diabetic neuropathy, unspecified (HCC) [E11.40]     Metabolic encephalopathy [G93.41]     Hyperlipidemia [E78.5]     Paroxysmal atrial fibrillation, hx of [I48.0]     Sleep apnea [G47.30]        Assessment/Plan:  Patient doing well    LLE dressings CDI  Cleared for DC to rehab by ortho pending medicine clearance  POD#4 S/P:  1.  Left transfemoral amputation.  2.  Removal of nonabsorbable antibiotic drug delivery device from the intramedullary left femur.  Wt bearing status - NWB LLE  Wound care/Drains - Dressings to be changed every other day by nursing. Or PRN for saturation    Future Procedures - none planned   Lovenox: Start 1/11, Duration-until ambulatory > 150'  Sutures/Staples out- 14-21 " days post operatively. Removal will completed by ortho mid levels only.  PT/OT-initiated  Antibiotics: completed  DVT Prophylaxis- TEDS/SCDs/Foot pumps  Amaro-not needed per ortho  Case Coordination for Discharge Planning - Disposition per therapy recs.

## 2024-01-21 PROBLEM — N30.01 ACUTE CYSTITIS WITH HEMATURIA: Status: ACTIVE | Noted: 2022-07-11

## 2024-01-21 LAB
ANION GAP SERPL CALC-SCNC: 9 MMOL/L (ref 7–16)
BACTERIA SPEC ANAEROBE CULT: NORMAL
BUN SERPL-MCNC: 14 MG/DL (ref 8–22)
C DIFF DNA SPEC QL NAA+PROBE: NEGATIVE
C DIFF TOX GENS STL QL NAA+PROBE: NEGATIVE
CALCIUM SERPL-MCNC: 7.6 MG/DL (ref 8.5–10.5)
CHLORIDE SERPL-SCNC: 104 MMOL/L (ref 96–112)
CO2 SERPL-SCNC: 20 MMOL/L (ref 20–33)
CREAT SERPL-MCNC: 0.56 MG/DL (ref 0.5–1.4)
ERYTHROCYTE [DISTWIDTH] IN BLOOD BY AUTOMATED COUNT: 48.8 FL (ref 35.9–50)
GFR SERPLBLD CREATININE-BSD FMLA CKD-EPI: 102 ML/MIN/1.73 M 2
GLUCOSE BLD STRIP.AUTO-MCNC: 129 MG/DL (ref 65–99)
GLUCOSE BLD STRIP.AUTO-MCNC: 168 MG/DL (ref 65–99)
GLUCOSE BLD STRIP.AUTO-MCNC: 174 MG/DL (ref 65–99)
GLUCOSE BLD STRIP.AUTO-MCNC: 189 MG/DL (ref 65–99)
GLUCOSE SERPL-MCNC: 125 MG/DL (ref 65–99)
HCT VFR BLD AUTO: 25.7 % (ref 37–47)
HGB BLD-MCNC: 8.6 G/DL (ref 12–16)
LACTOFERRIN STL QL IA: POSITIVE
MAGNESIUM SERPL-MCNC: 1.6 MG/DL (ref 1.5–2.5)
MCH RBC QN AUTO: 27.4 PG (ref 27–33)
MCHC RBC AUTO-ENTMCNC: 33.5 G/DL (ref 32.2–35.5)
MCV RBC AUTO: 81.8 FL (ref 81.4–97.8)
PHOSPHATE SERPL-MCNC: 3 MG/DL (ref 2.5–4.5)
PLATELET # BLD AUTO: 104 K/UL (ref 164–446)
PMV BLD AUTO: 10.7 FL (ref 9–12.9)
POTASSIUM SERPL-SCNC: 4.4 MMOL/L (ref 3.6–5.5)
RBC # BLD AUTO: 3.14 M/UL (ref 4.2–5.4)
SIGNIFICANT IND 70042: NORMAL
SITE SITE: NORMAL
SODIUM SERPL-SCNC: 133 MMOL/L (ref 135–145)
SOURCE SOURCE: NORMAL
WBC # BLD AUTO: 4.9 K/UL (ref 4.8–10.8)

## 2024-01-21 PROCEDURE — 83735 ASSAY OF MAGNESIUM: CPT

## 2024-01-21 PROCEDURE — 82962 GLUCOSE BLOOD TEST: CPT | Mod: 91

## 2024-01-21 PROCEDURE — 84100 ASSAY OF PHOSPHORUS: CPT

## 2024-01-21 PROCEDURE — 85027 COMPLETE CBC AUTOMATED: CPT

## 2024-01-21 PROCEDURE — 97602 WOUND(S) CARE NON-SELECTIVE: CPT

## 2024-01-21 PROCEDURE — A9270 NON-COVERED ITEM OR SERVICE: HCPCS | Performed by: INTERNAL MEDICINE

## 2024-01-21 PROCEDURE — 700111 HCHG RX REV CODE 636 W/ 250 OVERRIDE (IP): Mod: JZ | Performed by: INTERNAL MEDICINE

## 2024-01-21 PROCEDURE — 87493 C DIFF AMPLIFIED PROBE: CPT

## 2024-01-21 PROCEDURE — 87045 FECES CULTURE AEROBIC BACT: CPT

## 2024-01-21 PROCEDURE — 87046 STOOL CULTR AEROBIC BACT EA: CPT

## 2024-01-21 PROCEDURE — 80048 BASIC METABOLIC PNL TOTAL CA: CPT

## 2024-01-21 PROCEDURE — 700102 HCHG RX REV CODE 250 W/ 637 OVERRIDE(OP): Performed by: INTERNAL MEDICINE

## 2024-01-21 PROCEDURE — 87077 CULTURE AEROBIC IDENTIFY: CPT

## 2024-01-21 PROCEDURE — 83630 LACTOFERRIN FECAL (QUAL): CPT

## 2024-01-21 PROCEDURE — 36415 COLL VENOUS BLD VENIPUNCTURE: CPT

## 2024-01-21 PROCEDURE — 99233 SBSQ HOSP IP/OBS HIGH 50: CPT | Performed by: INTERNAL MEDICINE

## 2024-01-21 PROCEDURE — 87899 AGENT NOS ASSAY W/OPTIC: CPT

## 2024-01-21 PROCEDURE — 770001 HCHG ROOM/CARE - MED/SURG/GYN PRIV*

## 2024-01-21 RX ORDER — SULFAMETHOXAZOLE AND TRIMETHOPRIM 800; 160 MG/1; MG/1
1 TABLET ORAL EVERY 12 HOURS
Status: DISCONTINUED | OUTPATIENT
Start: 2024-01-21 | End: 2024-01-22 | Stop reason: HOSPADM

## 2024-01-21 RX ORDER — MAGNESIUM SULFATE HEPTAHYDRATE 40 MG/ML
2 INJECTION, SOLUTION INTRAVENOUS ONCE
Status: COMPLETED | OUTPATIENT
Start: 2024-01-21 | End: 2024-01-21

## 2024-01-21 RX ORDER — MAGNESIUM SULFATE HEPTAHYDRATE 40 MG/ML
2 INJECTION, SOLUTION INTRAVENOUS ONCE
Status: DISCONTINUED | OUTPATIENT
Start: 2024-01-21 | End: 2024-01-21

## 2024-01-21 RX ORDER — LANOLIN ALCOHOL/MO/W.PET/CERES
400 CREAM (GRAM) TOPICAL 2 TIMES DAILY
Status: COMPLETED | OUTPATIENT
Start: 2024-01-21 | End: 2024-01-22

## 2024-01-21 RX ADMIN — ENOXAPARIN SODIUM 40 MG: 100 INJECTION SUBCUTANEOUS at 05:14

## 2024-01-21 RX ADMIN — LOSARTAN POTASSIUM 100 MG: 50 TABLET, FILM COATED ORAL at 06:00

## 2024-01-21 RX ADMIN — Medication 400 MG: at 05:13

## 2024-01-21 RX ADMIN — GABAPENTIN 600 MG: 300 CAPSULE ORAL at 22:00

## 2024-01-21 RX ADMIN — MAGNESIUM SULFATE HEPTAHYDRATE 2 G: 2 INJECTION, SOLUTION INTRAVENOUS at 13:38

## 2024-01-21 RX ADMIN — OXYCODONE HYDROCHLORIDE 10 MG: 10 TABLET ORAL at 20:29

## 2024-01-21 RX ADMIN — SULFAMETHOXAZOLE AND TRIMETHOPRIM 1 TABLET: 800; 160 TABLET ORAL at 13:34

## 2024-01-21 RX ADMIN — ATORVASTATIN CALCIUM 20 MG: 20 TABLET, FILM COATED ORAL at 19:56

## 2024-01-21 RX ADMIN — HYDROMORPHONE HYDROCHLORIDE 0.5 MG: 1 INJECTION, SOLUTION INTRAMUSCULAR; INTRAVENOUS; SUBCUTANEOUS at 21:38

## 2024-01-21 RX ADMIN — METOPROLOL SUCCINATE 25 MG: 25 TABLET, EXTENDED RELEASE ORAL at 19:56

## 2024-01-21 RX ADMIN — OXYCODONE HYDROCHLORIDE 10 MG: 10 TABLET ORAL at 13:34

## 2024-01-21 RX ADMIN — LACTULOSE 15 ML: 10 SOLUTION ORAL; RECTAL at 05:15

## 2024-01-21 RX ADMIN — GABAPENTIN 600 MG: 300 CAPSULE ORAL at 13:35

## 2024-01-21 RX ADMIN — OMEPRAZOLE 20 MG: 20 CAPSULE, DELAYED RELEASE ORAL at 05:14

## 2024-01-21 RX ADMIN — INSULIN HUMAN 1 UNITS: 100 INJECTION, SOLUTION PARENTERAL at 11:17

## 2024-01-21 RX ADMIN — TIOTROPIUM BROMIDE INHALATION SPRAY 5 MCG: 3.12 SPRAY, METERED RESPIRATORY (INHALATION) at 08:16

## 2024-01-21 RX ADMIN — INSULIN HUMAN 1 UNITS: 100 INJECTION, SOLUTION PARENTERAL at 17:39

## 2024-01-21 RX ADMIN — FUROSEMIDE 20 MG: 20 TABLET ORAL at 05:14

## 2024-01-21 RX ADMIN — Medication 400 MG: at 17:34

## 2024-01-21 RX ADMIN — INSULIN HUMAN 1 UNITS: 100 INJECTION, SOLUTION PARENTERAL at 19:59

## 2024-01-21 RX ADMIN — OXYCODONE HYDROCHLORIDE 10 MG: 10 TABLET ORAL at 17:34

## 2024-01-21 RX ADMIN — OXYCODONE HYDROCHLORIDE 10 MG: 10 TABLET ORAL at 00:57

## 2024-01-21 RX ADMIN — GABAPENTIN 600 MG: 300 CAPSULE ORAL at 05:14

## 2024-01-21 ASSESSMENT — ENCOUNTER SYMPTOMS
NERVOUS/ANXIOUS: 1
FEVER: 0
ABDOMINAL PAIN: 0
MYALGIAS: 1
NAUSEA: 0
VOMITING: 0
WEAKNESS: 1

## 2024-01-21 ASSESSMENT — PAIN DESCRIPTION - PAIN TYPE
TYPE: ACUTE PAIN

## 2024-01-21 NOTE — CARE PLAN
The patient is Stable - Low risk of patient condition declining or worsening    Shift Goals  Clinical Goals: increase ambulation  Patient Goals: sleep  Family Goals: gisele    Progress made toward(s) clinical / shift goals:    Problem: Knowledge Deficit - Standard  Goal: Patient and family/care givers will demonstrate understanding of plan of care, disease process/condition, diagnostic tests and medications  Outcome: Progressing       Patient pleasant and cooperative, medicated per MAR for a pain score of 8/10 to left aka incision

## 2024-01-21 NOTE — DISCHARGE PLANNING
Case Management Discharge Planning    Admission Date: 1/11/2024  GMLOS: 10.1  ALOS: 10    6-Clicks ADL Score: 16  6-Clicks Mobility Score: 10  PT and/or OT Eval ordered: Yes  Post-acute Referrals Ordered: Yes  Post-acute Choice Obtained: Yes  Has referral(s) been sent to post-acute provider:  Yes      Anticipated Discharge Dispo: Discharge Disposition: D/T to SNF with Medicare cert in anticipation of skilled care (03)    DME Needed: No    Action(s) Taken: Pt is medically cleared to DC to [place]. COVID ISO will be lifted today however pt is still in ISO for [facility]. [place] does not any any ISO beds available today    Escalations Completed: None    Medically Clear: Yes    Next Steps: CM follow up with [place] tomorrow for ISO bed     Barriers to Discharge: Pending Placement

## 2024-01-21 NOTE — DISCHARGE PLANNING
Spoke to Rory Fortune at [facility] and she confirmed there are no ISO beds for patient today

## 2024-01-21 NOTE — WOUND TEAM
[facility]  Inpatient Services  Wound and Skin Care Brief Evaluation    Admission Date: 1/11/2024     Last order of IP CONSULT TO WOUND CARE was found on 1/18/2024 from Hospital Encounter on 1/11/2024     HPI, PMH, SH: Reviewed    Chief Complaint   Patient presents with    Other     EMS states patient here for PICC line placement for antibiotics     Wound Infection     Diagnosis: Sepsis (HCC) [A41.9]    Unit where seen by Wound Team: [id number]     Wound consult placed regarding coccyx. Chart and images reviewed. This discussed with bedside RNRicardo. This clinician in to assess patient. Patient pleasant and agreeable. Patient able turn to her right side with little assistance from RN. Non-selectively debrided with Moist warm washcloth.     No pressure injuries or advanced wound care needs identified. Sacrum and coccyx are blanching at this time, patient does have complaints of pain to the bone and can be high risk for breakdown if not offloaded properly.  Sacral offloading foam not available on the floor at this time, ordered via [employer] for delivery.  Bedside RN notified and will place when available.  Patient educated to turn to her side or to sit at the side of the bed to better offload the sacral region. Wound consult completed. No further follow up unless indicated and consulted.          PREVENTATIVE INTERVENTIONS:    Q shift Sincere - performed per nursing policy  Q shift pressure point assessments - performed per nursing policy    Surface/Positioning  Standard/trauma mattress - Currently in Place  Reposition q 2 hours - Currently in Place  TAPs Turning system - Currently in Place  Waffle overlay  - Currently in Place    Offloading/Redistribution  Sacral offloading dressing (Silicone dressing) - Ordered      Respiratory  Silicone O2 tubing - Currently in Place  Gray Foam Ear protectors - Currently in Place    Containment/Moisture Prevention    Dri-bello pad - Currently in Place  Purwick/Condom Cath -  Currently in Place    Mobilization      Unable to assess

## 2024-01-21 NOTE — CARE PLAN
The patient is Stable - Low risk of patient condition declining or worsening    Shift Goals  Clinical Goals: up to commode for BMs  Patient Goals: sleep  Family Goals: gisele    Progress made toward(s) clinical / shift goals:    Problem: Skin Integrity  Goal: Skin integrity is maintained or improved  Outcome: Progressing     Problem: Pain - Standard  Goal: Alleviation of pain or a reduction in pain to the patient’s comfort goal  Outcome: Progressing

## 2024-01-21 NOTE — PROGRESS NOTES
Hospital Medicine Daily Progress Note    Date of Service  1/21/2024    Chief Complaint  April Alicia is a 63 y.o. female admitted 1/11/2024 with recurrent admissions for chronically infected left knee arthroplasty who has failed multiple antibiotic spacers and multiple prolonged courses of IV antibiotics who was discharged from our hospital to [facility] on January 9, 2024 with no PICC line and possible IV daptomycin.  Patient returns from skilled nursing facility after being there 1 day with fevers and confusion.  Upon readmission from the ER during this hospital admission she was found to be COVID-positive but not hypoxic.  Her confusion is slightly improved and she has been admitted to the general medical floor and deferred surgery until January 16, 2024, which is her original surgical date.  She has been placed back on daptomycin and blood cultures which have been repeated are pending.  Per PT she is ambulating somewhat decently well but will remain in the hospital to her surgery will be done on Tuesday, January 16.    Status post left transfemoral amputation, removal of nonabsorbable antibiotic drug delivery device from the intramedullary left femur on 1/16.  Infectious disease recommended to discontinue antibiotics and monitor amputation site for any evidence of infection.    Hospital Course  See above    Interval Problem Update  Patient was seen and examined at bedside.  I have personally reviewed and interpreted vitals, labs, and imaging.    1/16.  Afebrile.  Mild hypertension.  On room air.  Tolerated left above-knee amputation this morning.  Currently states pain is controlled.  Still feels sedated from anesthesia.  Discussed plan of care with patient and family at bedside.  Discussed with ID who will see tomorrow.  Continue daptomycin for now  1/17.  Afebrile.  Mild hypertension.  On room air.  Patient is lethargic and disoriented.  Feels like she just got out of surgery which was yesterday.   Complains of headache and pain from AKA.  PT/OT recommended SNF.  Discussed with ID.  Okay to discontinue antibiotics.  1/18.  Afebrile.  Stable vitals.  On room air.  Patient more awake today.  She is very hard of hearing.  Denies fevers, chills, chest pain, shortness of breath.  Complains of left lower extremity pain.  Discussed with social work.  Okay for SNF.  Accepted at [facility] awaiting Iso bed.  1/19.  Afebrile.  Stable vitals.  On room air.  Replete mag, K.  Denies fever, chills, chest pains, shortness of breath.  Complains of left lower extremity pain.  Counseled to stay on top of pain meds rather than trying to tough it out and catch up.  1/20.  Afebrile.  Intermittent hypotension.  On room air.  Replete mag.  Denies fevers, chills, chest pains, shortness of breath.  Right lower extremity pain better controlled.  She would like a bath today.  Awaiting placement for isobed.  She will be off isolation for COVID tomorrow.  1/21.  Afebrile.  Intermittent hypotension.  On room air.  Replete mag.  More lethargic this morning.  Had a rough night with pain and diarrhea.  Will screen for C. difficile.  Left lower extremity pain well-controlled.  Hgb 11.7 > 9.9 > 8.8 > 8.6  P 72 > 69 > 83 > 104  Wbc 4.9    I have discussed this patient's plan of care and discharge plan at IDT rounds today with Case Management, Nursing, Nursing leadership, and other members of the IDT team.    Consultants/Specialty  ID  Orthopedics    Code Status  DNAR/DNI    Disposition  The patient is not medically cleared for discharge to home or a post-acute facility.  Anticipate discharge to: skilled nursing facility    I have placed the appropriate orders for post-discharge needs.    Review of Systems  Review of Systems   Constitutional:  Negative for fever and malaise/fatigue.        Near her baseline now, 1/14   Gastrointestinal:  Negative for abdominal pain, nausea and vomiting.   Musculoskeletal:  Positive for joint pain and myalgias.         Pain levels remain tolerable   Neurological:  Positive for weakness.   Psychiatric/Behavioral:  The patient is nervous/anxious (similar levels noted 1/15).         Physical Exam  Temp:  [36.4 °C (97.6 °F)-37.2 °C (98.9 °F)] 37.1 °C (98.8 °F)  Pulse:  [] 100  Resp:  [16-24] 20  BP: ()/(56-91) 123/67  SpO2:  [93 %-100 %] 96 %    Physical Exam  Vitals and nursing note reviewed.   Constitutional:       General: She is not in acute distress.     Appearance: She is well-developed.      Comments: Mildly anxious appearing today   HENT:      Head: Normocephalic and atraumatic.      Mouth/Throat:      Pharynx: No oropharyngeal exudate.   Eyes:      General: No scleral icterus.     Pupils: Pupils are equal, round, and reactive to light.   Neck:      Thyroid: No thyromegaly.   Cardiovascular:      Rate and Rhythm: Normal rate and regular rhythm.      Heart sounds: Normal heart sounds. No murmur heard.  Pulmonary:      Effort: Pulmonary effort is normal. No respiratory distress.      Breath sounds: Normal breath sounds. No wheezing.   Abdominal:      General: Bowel sounds are normal. There is no distension.      Palpations: Abdomen is soft.      Tenderness: There is no abdominal tenderness.   Musculoskeletal:         General: Swelling and tenderness present. Normal range of motion.      Cervical back: Normal range of motion and neck supple.      Left lower leg: Edema present.      Comments: R knee surgical scar is C/D/I  L knee with swelling and erythema, limited ROM and surgical scar which is well healed, TTP appreciated  No other changes noted 1/15   Skin:     General: Skin is warm and dry.      Findings: No rash.   Neurological:      Mental Status: She is alert and oriented to person, place, and time.      Cranial Nerves: No cranial nerve deficit.         Fluids    Intake/Output Summary (Last 24 hours) at 1/21/2024 0641  Last data filed at 1/20/2024 0841  Gross per 24 hour   Intake 240 ml   Output --   Net 240 ml          Laboratory  Recent Labs     01/19/24  0149 01/20/24  0039 01/21/24  0458   WBC 5.2 5.2 4.9   RBC 3.18* 3.10* 3.14*   HEMOGLOBIN 8.8* 8.6* 8.6*   HEMATOCRIT 26.7* 26.3* 25.7*   MCV 84.0 84.8 81.8   MCH 27.7 27.7 27.4   MCHC 33.0 32.7 33.5   RDW 48.7 50.7* 48.8   PLATELETCT 69* 83* 104*   MPV 9.0 11.7 10.7       Recent Labs     01/19/24  0149 01/20/24  0039 01/21/24  0458   SODIUM 134* 131* 133*   POTASSIUM 3.7 4.7 4.4   CHLORIDE 104 105 104   CO2 22 21 20   GLUCOSE 131* 150* 125*   BUN 18 19 14   CREATININE 0.66 0.60 0.56   CALCIUM 7.2* 7.4* 7.6*                       Imaging  US-EXTREMITY VENOUS LOWER UNILAT LEFT   Final Result      DX-CHEST-PORTABLE (1 VIEW)   Final Result      Cardiomegaly and mild interstitial opacity/edema.      No significant consolidation or pleural effusion.           Assessment/Plan  * Septic arthritis (HCC)- (present on admission)  Assessment & Plan  1/21/2024  Patient is being followed by Ortho before and planning for surgery as outpatient  Patient came with fever  Ortho was consulted and planning for OR and I&D during this admission  Continue daptomycin, CPK is ok, blood cxs ngtd  Surgery to be performed 1/16  Keep in house  US LE venous duplex negative for DVT  WBC is normal, afebrile  Status post left above-knee amputation 1/16  Okay to DC antibiotics per ID    Anemia- (present on admission)  Assessment & Plan  1/21/2024  AOCD  Near her baseline  Follow    ACP (advance care planning)- (present on admission)  Assessment & Plan  1/21/2024  Confirmed DNR/DNI    COVID-19- (present on admission)  Assessment & Plan  1/21/2024  No respiratory failure  Patient is on room air  Resolved    Sepsis (HCC)- (present on admission)  Assessment & Plan  1/21/2024  resolved    Sleep apnea- (present on admission)  Assessment & Plan  1/21/2024  CPAP at night    Type 2 diabetes mellitus with diabetic neuropathy, unspecified (HCC)- (present on admission)  Assessment & Plan  1/21/2024  A1c  5.7  Continue ISS, BS's remain mildly elevated, continue for now for planned surgery    Fever- (present on admission)  Assessment & Plan  1/21/2024  Likely related to covid, but high risk for bacteremia  Defer PICC line until blood cultures result (may not need further antibiotics depending on level of amputation)  NO decadron at this time for covid  Afebrile for several days now    Hypomagnesemia- (present on admission)  Assessment & Plan  1/21/2024  Monitor     Acute cystitis with hematuria- (present on admission)  Assessment & Plan  1/21/2024  Klebsiella ESBL noted.  Started Bactrim    Metabolic encephalopathy- (present on admission)  Assessment & Plan  1/21/2024  Likely related to fever and sepsis   Resolved    Paroxysmal atrial fibrillation, hx of- (present on admission)  Assessment & Plan  1/21/2024  No RVR  Continue metoprolol and losartan  Patient is not on anticoagulation before    Hypertension- (present on admission)  Assessment & Plan  1/21/2024  Continue lasix 20 mg daily, losartan 100 mg daily, toprol XL 25 mg daily    Thrombocytopenia (HCC)- (present on admission)  Assessment & Plan  1/21/2024  Mild, trend    Diabetic polyneuropathy associated with type 2 diabetes mellitus (HCC)- (present on admission)  Assessment & Plan  1/21/2024  Continue gabapentin         VTE prophylaxis: Lovenox    I have performed a physical exam and reviewed and updated ROS and Plan today (1/21/2024). In review of yesterday's note (1/20/2024), there are no changes except as documented above.    Greater than 51 minutes spent prepping to see patient (e.g. review of tests) obtaining and/or reviewing separately obtained history. Performing a medically appropriate examination and/ evaluation.  Counseling and educating the patient/family/caregiver.  Ordering medications, tests, or procedures.  Referring and communicating with other health care professionals.  Documenting clinical information in EPIC.  Independently interpreting  results and communicating results to patient/family/caregiver.  Care coordination.

## 2024-01-22 ENCOUNTER — PATIENT OUTREACH (OUTPATIENT)
Dept: SCHEDULING | Facility: IMAGING CENTER | Age: 64
End: 2024-01-22
Payer: MEDICAID

## 2024-01-22 VITALS
HEART RATE: 98 BPM | HEIGHT: 68 IN | BODY MASS INDEX: 28.49 KG/M2 | RESPIRATION RATE: 17 BRPM | OXYGEN SATURATION: 97 % | WEIGHT: 188 LBS | SYSTOLIC BLOOD PRESSURE: 122 MMHG | TEMPERATURE: 98.6 F | DIASTOLIC BLOOD PRESSURE: 71 MMHG

## 2024-01-22 PROBLEM — U07.1 COVID-19: Status: RESOLVED | Noted: 2024-01-11 | Resolved: 2024-01-22

## 2024-01-22 PROBLEM — R11.0 NAUSEA: Status: ACTIVE | Noted: 2024-01-22

## 2024-01-22 PROBLEM — M00.9 SEPTIC ARTHRITIS (HCC): Status: RESOLVED | Noted: 2024-01-02 | Resolved: 2024-01-22

## 2024-01-22 PROBLEM — A41.9 SEPSIS (HCC): Status: RESOLVED | Noted: 2024-01-11 | Resolved: 2024-01-22

## 2024-01-22 PROBLEM — R50.9 FEVER: Status: RESOLVED | Noted: 2023-01-07 | Resolved: 2024-01-22

## 2024-01-22 PROBLEM — G93.41 METABOLIC ENCEPHALOPATHY: Status: RESOLVED | Noted: 2022-07-10 | Resolved: 2024-01-22

## 2024-01-22 LAB
AMMONIA PLAS-SCNC: 154 UMOL/L (ref 11–45)
ANION GAP SERPL CALC-SCNC: 7 MMOL/L (ref 7–16)
BUN SERPL-MCNC: 15 MG/DL (ref 8–22)
CALCIUM SERPL-MCNC: 7.3 MG/DL (ref 8.5–10.5)
CHLORIDE SERPL-SCNC: 108 MMOL/L (ref 96–112)
CO2 SERPL-SCNC: 20 MMOL/L (ref 20–33)
CREAT SERPL-MCNC: 0.65 MG/DL (ref 0.5–1.4)
E COLI SXT1+2 STL IA: NORMAL
ERYTHROCYTE [DISTWIDTH] IN BLOOD BY AUTOMATED COUNT: 54.3 FL (ref 35.9–50)
GFR SERPLBLD CREATININE-BSD FMLA CKD-EPI: 98 ML/MIN/1.73 M 2
GLUCOSE BLD STRIP.AUTO-MCNC: 129 MG/DL (ref 65–99)
GLUCOSE BLD STRIP.AUTO-MCNC: 177 MG/DL (ref 65–99)
GLUCOSE SERPL-MCNC: 129 MG/DL (ref 65–99)
HCT VFR BLD AUTO: 28.9 % (ref 37–47)
HGB BLD-MCNC: 9.1 G/DL (ref 12–16)
MAGNESIUM SERPL-MCNC: 1.8 MG/DL (ref 1.5–2.5)
MCH RBC QN AUTO: 27.3 PG (ref 27–33)
MCHC RBC AUTO-ENTMCNC: 31.5 G/DL (ref 32.2–35.5)
MCV RBC AUTO: 86.8 FL (ref 81.4–97.8)
PHOSPHATE SERPL-MCNC: 3.2 MG/DL (ref 2.5–4.5)
PLATELET # BLD AUTO: 133 K/UL (ref 164–446)
PMV BLD AUTO: 11.4 FL (ref 9–12.9)
POTASSIUM SERPL-SCNC: 4.1 MMOL/L (ref 3.6–5.5)
RBC # BLD AUTO: 3.33 M/UL (ref 4.2–5.4)
SIGNIFICANT IND 70042: NORMAL
SITE SITE: NORMAL
SODIUM SERPL-SCNC: 135 MMOL/L (ref 135–145)
SOURCE SOURCE: NORMAL
WBC # BLD AUTO: 5.4 K/UL (ref 4.8–10.8)

## 2024-01-22 PROCEDURE — 700111 HCHG RX REV CODE 636 W/ 250 OVERRIDE (IP): Mod: JZ | Performed by: INTERNAL MEDICINE

## 2024-01-22 PROCEDURE — A9270 NON-COVERED ITEM OR SERVICE: HCPCS | Performed by: INTERNAL MEDICINE

## 2024-01-22 PROCEDURE — 700102 HCHG RX REV CODE 250 W/ 637 OVERRIDE(OP): Performed by: INTERNAL MEDICINE

## 2024-01-22 PROCEDURE — 82140 ASSAY OF AMMONIA: CPT

## 2024-01-22 PROCEDURE — 85027 COMPLETE CBC AUTOMATED: CPT

## 2024-01-22 PROCEDURE — 36415 COLL VENOUS BLD VENIPUNCTURE: CPT

## 2024-01-22 PROCEDURE — 83735 ASSAY OF MAGNESIUM: CPT

## 2024-01-22 PROCEDURE — 80048 BASIC METABOLIC PNL TOTAL CA: CPT

## 2024-01-22 PROCEDURE — 99239 HOSP IP/OBS DSCHRG MGMT >30: CPT | Performed by: INTERNAL MEDICINE

## 2024-01-22 PROCEDURE — 84100 ASSAY OF PHOSPHORUS: CPT

## 2024-01-22 PROCEDURE — 82962 GLUCOSE BLOOD TEST: CPT | Mod: 91

## 2024-01-22 RX ORDER — SULFAMETHOXAZOLE AND TRIMETHOPRIM 800; 160 MG/1; MG/1
1 TABLET ORAL EVERY 12 HOURS
Qty: 10 TABLET | Refills: 0 | Status: ACTIVE | DISCHARGE
Start: 2024-01-22 | End: 2024-01-27

## 2024-01-22 RX ORDER — OXYCODONE HYDROCHLORIDE 5 MG/1
5 TABLET ORAL EVERY 6 HOURS PRN
Qty: 20 TABLET | Refills: 0 | Status: SHIPPED | OUTPATIENT
Start: 2024-01-22 | End: 2024-01-27

## 2024-01-22 RX ORDER — LANOLIN ALCOHOL/MO/W.PET/CERES
400 CREAM (GRAM) TOPICAL 2 TIMES DAILY
Status: DISCONTINUED | OUTPATIENT
Start: 2024-01-22 | End: 2024-01-22 | Stop reason: HOSPADM

## 2024-01-22 RX ORDER — ONDANSETRON 4 MG/1
4 TABLET, ORALLY DISINTEGRATING ORAL EVERY 4 HOURS PRN
Qty: 10 TABLET | Refills: 0 | Status: SHIPPED
Start: 2024-01-22

## 2024-01-22 RX ORDER — LACTULOSE 20 G/30ML
15 SOLUTION ORAL 3 TIMES DAILY
Status: DISCONTINUED | OUTPATIENT
Start: 2024-01-22 | End: 2024-01-22 | Stop reason: HOSPADM

## 2024-01-22 RX ADMIN — Medication 400 MG: at 08:40

## 2024-01-22 RX ADMIN — LACTULOSE 15 ML: 10 SOLUTION ORAL; RECTAL at 07:39

## 2024-01-22 RX ADMIN — LACTULOSE 15 ML: 20 SOLUTION ORAL at 11:39

## 2024-01-22 RX ADMIN — OMEPRAZOLE 20 MG: 20 CAPSULE, DELAYED RELEASE ORAL at 04:27

## 2024-01-22 RX ADMIN — GABAPENTIN 600 MG: 300 CAPSULE ORAL at 14:08

## 2024-01-22 RX ADMIN — FUROSEMIDE 20 MG: 20 TABLET ORAL at 04:26

## 2024-01-22 RX ADMIN — SULFAMETHOXAZOLE AND TRIMETHOPRIM 1 TABLET: 800; 160 TABLET ORAL at 04:27

## 2024-01-22 RX ADMIN — LOSARTAN POTASSIUM 100 MG: 50 TABLET, FILM COATED ORAL at 04:26

## 2024-01-22 RX ADMIN — OXYCODONE HYDROCHLORIDE 10 MG: 10 TABLET ORAL at 07:49

## 2024-01-22 RX ADMIN — TIOTROPIUM BROMIDE INHALATION SPRAY 5 MCG: 3.12 SPRAY, METERED RESPIRATORY (INHALATION) at 07:39

## 2024-01-22 RX ADMIN — INSULIN HUMAN 1 UNITS: 100 INJECTION, SOLUTION PARENTERAL at 11:46

## 2024-01-22 RX ADMIN — OXYCODONE HYDROCHLORIDE 10 MG: 10 TABLET ORAL at 11:39

## 2024-01-22 RX ADMIN — OXYCODONE HYDROCHLORIDE 10 MG: 10 TABLET ORAL at 14:52

## 2024-01-22 RX ADMIN — GABAPENTIN 600 MG: 300 CAPSULE ORAL at 04:26

## 2024-01-22 RX ADMIN — Medication 400 MG: at 04:26

## 2024-01-22 RX ADMIN — OXYCODONE HYDROCHLORIDE 10 MG: 10 TABLET ORAL at 04:26

## 2024-01-22 RX ADMIN — ENOXAPARIN SODIUM 40 MG: 100 INJECTION SUBCUTANEOUS at 04:27

## 2024-01-22 ASSESSMENT — PAIN DESCRIPTION - PAIN TYPE
TYPE: ACUTE PAIN
TYPE: ACUTE PAIN

## 2024-01-22 NOTE — CARE PLAN
The patient is Stable - Low risk of patient condition declining or worsening    Shift Goals  Clinical Goals: monitor incison, change dressing  Patient Goals: rest, pain control  Family Goals: gisele    Progress made toward(s) clinical / shift goals:    Problem: Pain - Standard  Goal: Alleviation of pain or a reduction in pain to the patient’s comfort goal  Outcome: Not Progressing       Patient is not progressing towards the following goals:  Patient reports 9/10 pain at incisional site as well as phantom pain. Medicated per mar with a sedation score of 0. L AKA incision cleaned and redressed - updated photo in flow sheet. Call light and personal items within reach, encouraged to use call light for assistance

## 2024-01-22 NOTE — PROGRESS NOTES
"      Orthopaedic Progress Note    Interval changes:  Patient doing well    LLE dressings CDI  Cleared for DC to rehab by ortho pending medicine clearance    ROS - Patient denies any new issues.  Pain well controlled.    BP 94/56   Pulse 76   Temp 37.1 °C (98.8 °F) (Oral)   Resp 18   Ht 1.727 m (5' 8\")   Wt 85.3 kg (188 lb)   SpO2 95%     Patient seen and examined  No acute distress  Breathing non labored  RRR  LLE dressings CDI, DNVI, moves all toes, cap refill <2 sec.    Recent Labs     01/20/24  0039 01/21/24  0458 01/22/24  0826   WBC 5.2 4.9 5.4   RBC 3.10* 3.14* 3.33*   HEMOGLOBIN 8.6* 8.6* 9.1*   HEMATOCRIT 26.3* 25.7* 28.9*   MCV 84.8 81.8 86.8   MCH 27.7 27.4 27.3   MCHC 32.7 33.5 31.5*   RDW 50.7* 48.8 54.3*   PLATELETCT 83* 104* 133*   MPV 11.7 10.7 11.4       Active Hospital Problems    Diagnosis     Hypertension [I10]      Priority: Medium    Thrombocytopenia (HCC) [D69.6]      Priority: Low    Diabetic polyneuropathy associated with type 2 diabetes mellitus (HCC) [E11.42]      Priority: Low     - Continue gabapentin for patient's neuropathic symptoms.      Anemia [D64.9]     Sepsis (HCC) [A41.9]     COVID-19 [U07.1]     ACP (advance care planning) [Z71.89]     Septic arthritis (HCC) [M00.9]     Fever [R50.9]     Hypomagnesemia [E83.42]     Type 2 diabetes mellitus with diabetic neuropathy, unspecified (HCC) [E11.40]     Acute cystitis with hematuria [N30.01]     Metabolic encephalopathy [G93.41]     Hyperlipidemia [E78.5]     Paroxysmal atrial fibrillation, hx of [I48.0]     Sleep apnea [G47.30]        Assessment/Plan:  Patient doing well    LLE dressings CDI  Cleared for DC to rehab by ortho pending medicine clearance  POD#6 S/P:  1.  Left transfemoral amputation.  2.  Removal of nonabsorbable antibiotic drug delivery device from the intramedullary left femur.  Wt bearing status - NWB LLE  Wound care/Drains - Dressings to be changed every other day by nursing. Or PRN for saturation    Future " Procedures - none planned   Lovenox: Start 1/11, Duration-until ambulatory > 150'  Sutures/Staples out- 14-21 days post operatively. Removal will completed by ortho mid levels only.  PT/OT-initiated  Antibiotics: completed  DVT Prophylaxis- TEDS/SCDs/Foot pumps  Amaro-not needed per ortho  Case Coordination for Discharge Planning - Disposition per therapy recs.

## 2024-01-22 NOTE — PROGRESS NOTES
Pt has had two very loose BMs with mucous and undigested food noted today.  This is the 2nd day of loose Bms with noc shift noting several loose Bms as well.  MD notified and sample collected.  C-diff rule-out initiated.

## 2024-01-22 NOTE — DISCHARGE PLANNING
Case Management Discharge Planning    Admission Date: 1/11/2024  GMLOS: 10.1  ALOS: 11    6-Clicks ADL Score: 16  6-Clicks Mobility Score: 10  PT and/or OT Eval ordered: Yes  Post-acute Referrals Ordered: Yes  Post-acute Choice Obtained: Yes  Has referral(s) been sent to post-acute provider:  Yes      Anticipated Discharge Dispo: Discharge Disposition: D/T to SNF with Medicare cert in anticipation of skilled care (03)    DME Needed: No    Action(s) Taken: LMSW updated by Petra Lambert does not have any open beds available for ESBL iso. [place] to keep with LMSW updated on placement.     Addendum @ 8390: LMSW updated by Petra. Petra accepting pt today and can the pt by 1700. LMSW to put in for transport and update MD and bedside staff.     Addendum @ 1438: LMSW completed COBRA. Transport pending.     Escalations Completed: None    Medically Clear: Yes    Next Steps: LMSW to follow for placement.     Barriers to Discharge: Pending Placement    Is the patient up for discharge tomorrow: No

## 2024-01-22 NOTE — CARE PLAN
The patient is Stable - Low risk of patient condition declining or worsening    Shift Goals  Clinical Goals: Keep skin clean and dry  Patient Goals: Reduce number of BMs  Family Goals: gisele    Progress made toward(s) clinical / shift goals:    Problem: Knowledge Deficit - Standard  Goal: Patient and family/care givers will demonstrate understanding of plan of care, disease process/condition, diagnostic tests and medications  Outcome: Progressing     Problem: Skin Integrity  Goal: Skin integrity is maintained or improved  Outcome: Progressing

## 2024-01-22 NOTE — PROGRESS NOTES
"      Orthopaedic Progress Note    Interval changes:  Patient doing well    LLE dressings CDI  Cleared for DC to rehab by ortho pending medicine clearance    ROS - Patient denies any new issues.  Pain well controlled.    /76   Pulse (!) 102   Temp 38 °C (100.4 °F) (Temporal)   Resp 19   Ht 1.727 m (5' 8\")   Wt 85.3 kg (188 lb)   SpO2 95%     Patient seen and examined  No acute distress  Breathing non labored  RRR  LLE dressings CDI, DNVI, moves all toes, cap refill <2 sec.    Recent Labs     01/19/24  0149 01/20/24  0039 01/21/24  0458   WBC 5.2 5.2 4.9   RBC 3.18* 3.10* 3.14*   HEMOGLOBIN 8.8* 8.6* 8.6*   HEMATOCRIT 26.7* 26.3* 25.7*   MCV 84.0 84.8 81.8   MCH 27.7 27.7 27.4   MCHC 33.0 32.7 33.5   RDW 48.7 50.7* 48.8   PLATELETCT 69* 83* 104*   MPV 9.0 11.7 10.7       Active Hospital Problems    Diagnosis     Hypertension [I10]      Priority: Medium    Thrombocytopenia (HCC) [D69.6]      Priority: Low    Diabetic polyneuropathy associated with type 2 diabetes mellitus (HCC) [E11.42]      Priority: Low     - Continue gabapentin for patient's neuropathic symptoms.      Anemia [D64.9]     Sepsis (HCC) [A41.9]     COVID-19 [U07.1]     ACP (advance care planning) [Z71.89]     Septic arthritis (HCC) [M00.9]     Fever [R50.9]     Hypomagnesemia [E83.42]     Type 2 diabetes mellitus with diabetic neuropathy, unspecified (HCC) [E11.40]     Acute cystitis with hematuria [N30.01]     Metabolic encephalopathy [G93.41]     Hyperlipidemia [E78.5]     Paroxysmal atrial fibrillation, hx of [I48.0]     Sleep apnea [G47.30]        Assessment/Plan:  Patient doing well    LLE dressings CDI  Cleared for DC to rehab by ortho pending medicine clearance  POD#5 S/P:  1.  Left transfemoral amputation.  2.  Removal of nonabsorbable antibiotic drug delivery device from the intramedullary left femur.  Wt bearing status - NWB LLE  Wound care/Drains - Dressings to be changed every other day by nursing. Or PRN for saturation  "   Future Procedures - none planned   Lovenox: Start 1/11, Duration-until ambulatory > 150'  Sutures/Staples out- 14-21 days post operatively. Removal will completed by ortho mid levels only.  PT/OT-initiated  Antibiotics: completed  DVT Prophylaxis- TEDS/SCDs/Foot pumps  Amaro-not needed per ortho  Case Coordination for Discharge Planning - Disposition per therapy recs.

## 2024-01-23 NOTE — DISCHARGE INSTRUCTIONS
Discharge Instructions per Dr. Mark Kemp D.O.    DIET: Diet Order Diet: Consistent CHO (Diabetic)    ACTIVITY: As tolerated    A proper diet that is low in grease, fat, and salt, along with 30 minutes of exercise per day will lead to weight loss, and better controlled blood sugar and blood pressure.    DIAGNOSIS: Septic arthritis (HCC)    Follow up with your Primary Care Provider Anum Gatica M.D. as scheduled or sooner if your symptoms persist or worsen.  Return to Emergency Room for sever chest pain, shortness of breath, signs of a stroke, or any other emergencies.

## 2024-01-23 NOTE — DISCHARGE SUMMARY
Discharge Summary    CHIEF COMPLAINT ON ADMISSION  Chief Complaint   Patient presents with    Other     EMS states patient here for PICC line placement for antibiotics     Wound Infection       Reason for Admission  EMS     Admission Date  1/11/2024    CODE STATUS  DNAR/DNI    HPI & HOSPITAL COURSE  April Alicia is a 63 y.o. female admitted 1/11/2024 with recurrent admissions for chronically infected left knee arthroplasty who has failed multiple antibiotic spacers and multiple prolonged courses of IV antibiotics who was discharged from our hospital to [facility] on January 9, 2024 with no PICC line and possible IV daptomycin.  Patient returns from skilled nursing facility after being there 1 day with fevers and confusion.  Upon readmission from the ER during this hospital admission she was found to be COVID-positive but not hypoxic.  Her confusion is slightly improved and she has been admitted to the general medical floor and deferred surgery until January 16, 2024, which is her original surgical date.  She has been placed back on daptomycin and blood cultures which have been repeated are pending.  Per PT she is ambulating somewhat decently well but will remain in the hospital to her surgery will be done on Tuesday, January 16.     Status post left transfemoral amputation, removal of nonabsorbable antibiotic drug delivery device from the intramedullary left femur on 1/16.  Infectious disease recommended to discontinue antibiotics and monitor amputation site for any evidence of infection.    Patient has completed her isolation from COVID.  She was found to have Klebsiella ESBL urinary tract infection and started on Bactrim.  She did develop some loose stools but was negative for C. difficile.  She had some intermittent lethargy especially after surgery.  Initially this was attributed to pain meds.  Her ammonia was also a little elevated at 154.  Her daily lactulose was increased to 3 times a day with  improvement in mental status.    Medically stable discharge to SNF.  Follow-up with primary care, Ortho as outpatient.    Therefore, she is discharged in fair and stable condition to skilled nursing facility.    The patient met 2-midnight criteria for an inpatient stay at the time of discharge.    Discharge Date  1/22/2024    FOLLOW UP ITEMS POST DISCHARGE  None    DISCHARGE DIAGNOSES  Principal Problem (Resolved):    Septic arthritis (HCC) (POA: Yes)  Active Problems:    Diabetic polyneuropathy associated with type 2 diabetes mellitus (HCC) (POA: Yes)      Overview: - Continue gabapentin for patient's neuropathic symptoms.    Thrombocytopenia (HCC) (POA: Yes)    Hypertension (Chronic) (POA: Yes)    Paroxysmal atrial fibrillation, hx of (POA: Yes)    Hyperlipidemia (POA: Yes)    Acute cystitis with hematuria (POA: Yes)    Hypomagnesemia (POA: Yes)    Type 2 diabetes mellitus with diabetic neuropathy, unspecified (HCC) (POA: Yes)    Sleep apnea (POA: Yes)    ACP (advance care planning) (POA: Yes)    Anemia (POA: Yes)    Nausea (POA: Unknown)  Resolved Problems:    Metabolic encephalopathy (POA: Yes)    Fever (POA: Yes)    Sepsis (HCC) (POA: Yes)    COVID-19 (POA: Yes)      FOLLOW UP  Obdulio Gray M.D.  [address]  [address] [address] [phone]    Follow up  Call ASAP to schedule fu appt for 3-4 weeks postop    Anum Gatica M.D.  [address] [facility] [address] [phone]    Call  Please call your primary care provider to schedule a hospital follow up. Thank you.      MEDICATIONS ON DISCHARGE     Medication List        START taking these medications        Instructions   ondansetron 4 MG Tbdp  Commonly known as: Zofran ODT   Take 1 Tablet by mouth every four hours as needed for Nausea/Vomiting (give PO if no IV route available).  Dose: 4 mg     sulfamethoxazole-trimethoprim 800-160 MG tablet  Commonly known as: Bactrim DS   Take 1 Tablet by mouth every 12 hours for 5  days.  Dose: 1 Tablet            CONTINUE taking these medications        Instructions   atorvastatin 20 MG Tabs  Commonly known as: Lipitor   Take 1 Tablet by mouth every evening.  Dose: 20 mg     ferrous sulfate 325 (65 Fe) MG tablet   Take 325 mg by mouth every day.  Dose: 325 mg     furosemide 20 MG Tabs  Commonly known as: Lasix   Take 1 Tablet by mouth every day.  Dose: 20 mg     gabapentin 300 MG Caps  Commonly known as: Neurontin   Take 2 Capsules by mouth every 8 hours.  Dose: 600 mg     lactulose 20 GM/30ML Soln   Take 15 mL by mouth every day.  Dose: 15 mL     losartan 100 MG Tabs  Commonly known as: Cozaar   Take 100 mg by mouth every day.  Dose: 100 mg     metoprolol SR 25 MG Tb24  Commonly known as: Toprol XL   Take 1 Tablet by mouth every evening.  Dose: 25 mg     oxyCODONE immediate-release 5 MG Tabs  Commonly known as: Roxicodone   Take 1 Tablet by mouth every 6 hours as needed for Severe Pain for up to 5 days.  Dose: 5 mg     pantoprazole 20 MG tablet  Commonly known as: Protonix   Take 20 mg by mouth every day.  Dose: 20 mg     potassium chloride 10 MEQ capsule  Commonly known as: Micro-K   Take 1 Capsule by mouth every day.  Dose: 10 mEq     Spiriva HandiHaler 18 MCG Caps  Generic drug: tiotropium   Place 1 Capsule into inhaler and inhale every day.  Dose: 1 Capsule            STOP taking these medications      NS SOLN 50 mL with DAPTOmycin 500 MG SOLR 700 mg              Allergies  No Known Allergies    DIET  Orders Placed This Encounter   Procedures    Diet Order Diet: Consistent CHO (Diabetic)     Standing Status:   Standing     Number of Occurrences:   1     Order Specific Question:   Diet:     Answer:   Consistent CHO (Diabetic) [4]       ACTIVITY  As tolerated.  Weight bearing as tolerated    CONSULTATIONS  Ortho, ID    PROCEDURES  BKA    LABORATORY  Lab Results   Component Value Date    SODIUM 135 01/22/2024    POTASSIUM 4.1 01/22/2024    CHLORIDE 108 01/22/2024    CO2 20 01/22/2024     GLUCOSE 129 (H) 01/22/2024    BUN 15 01/22/2024    CREATININE 0.65 01/22/2024    CREATININE 0.9 03/12/2009        Lab Results   Component Value Date    WBC 5.4 01/22/2024    HEMOGLOBIN 9.1 (L) 01/22/2024    HEMATOCRIT 28.9 (L) 01/22/2024    PLATELETCT 133 (L) 01/22/2024        I discussed medications and side effects with the patient.  I discussed prognosis and importance of medical compliance with the patient.  I counseled the patient about diet, exercise, weight loss, smoking cessation, and life style modifications.  All questions and concerns have been addressed.  Total time of the discharge process was 38 minutes.

## 2024-01-23 NOTE — DISCHARGE PLANNING
DC Transport Scheduled    Transport Company Scheduled:  [employer]  Spoke with Dolores at [facility] to schedule transport.  [facility] Trip #: [id number]    Scheduled Date: 1/22/2024  Scheduled Time: 1730    Destination: [facility] at [address] [place] [address]    Notified care team of scheduled transport via [employer].     If there are any changes needed to the DC transportation scheduled, please contact [facility] at ext. [phone] between the hours of 2977-4150 Mon-Fri. If outside those hours, contact the ED Case Manager at ext. [phone].

## 2024-01-23 NOTE — DISCHARGE PLANNING
@1720  [facility] notified [facility] [place] that pt will transport at 1730.

## 2024-01-24 LAB
BACTERIA STL CULT: NORMAL
E COLI SXT1+2 STL IA: NORMAL
SIGNIFICANT IND 70042: NORMAL
SITE SITE: NORMAL
SOURCE SOURCE: NORMAL

## 2024-01-29 ENCOUNTER — HOSPITAL ENCOUNTER (OUTPATIENT)
Facility: MEDICAL CENTER | Age: 64
End: 2024-01-29
Attending: NURSE PRACTITIONER
Payer: MEDICAID

## 2024-01-29 LAB — ERYTHROCYTE [SEDIMENTATION RATE] IN BLOOD BY WESTERGREN METHOD: 40 MM/HOUR (ref 0–25)

## 2024-01-29 PROCEDURE — 85652 RBC SED RATE AUTOMATED: CPT

## 2024-02-05 ENCOUNTER — HOSPITAL ENCOUNTER (OUTPATIENT)
Facility: MEDICAL CENTER | Age: 64
End: 2024-02-05
Attending: FAMILY MEDICINE
Payer: MEDICAID

## 2024-02-05 LAB — ERYTHROCYTE [SEDIMENTATION RATE] IN BLOOD BY WESTERGREN METHOD: 26 MM/HOUR (ref 0–25)

## 2024-02-05 PROCEDURE — 85652 RBC SED RATE AUTOMATED: CPT

## 2024-02-12 ENCOUNTER — HOSPITAL ENCOUNTER (OUTPATIENT)
Facility: MEDICAL CENTER | Age: 64
End: 2024-02-12
Attending: FAMILY MEDICINE
Payer: MEDICAID

## 2024-02-12 PROCEDURE — 85652 RBC SED RATE AUTOMATED: CPT

## 2024-02-13 LAB — ERYTHROCYTE [SEDIMENTATION RATE] IN BLOOD BY WESTERGREN METHOD: 23 MM/HOUR (ref 0–25)

## 2024-02-16 ENCOUNTER — HOSPITAL ENCOUNTER (OUTPATIENT)
Facility: MEDICAL CENTER | Age: 64
End: 2024-02-16
Attending: FAMILY MEDICINE
Payer: MEDICAID

## 2024-02-16 PROCEDURE — 87186 SC STD MICRODIL/AGAR DIL: CPT

## 2024-02-16 PROCEDURE — 87086 URINE CULTURE/COLONY COUNT: CPT

## 2024-02-16 PROCEDURE — 87077 CULTURE AEROBIC IDENTIFY: CPT

## 2024-02-18 LAB
BACTERIA UR CULT: ABNORMAL
BACTERIA UR CULT: ABNORMAL
SIGNIFICANT IND 70042: ABNORMAL
SITE SITE: ABNORMAL
SOURCE SOURCE: ABNORMAL

## 2024-03-04 ENCOUNTER — HOSPITAL ENCOUNTER (OUTPATIENT)
Facility: MEDICAL CENTER | Age: 64
End: 2024-03-04
Attending: FAMILY MEDICINE
Payer: COMMERCIAL

## 2024-03-06 LAB
BACTERIA UR CULT: NORMAL
SIGNIFICANT IND 70042: NORMAL
SITE SITE: NORMAL
SOURCE SOURCE: NORMAL

## 2024-03-08 ENCOUNTER — HOSPITAL ENCOUNTER (OUTPATIENT)
Facility: MEDICAL CENTER | Age: 64
End: 2024-03-08
Attending: NURSE PRACTITIONER
Payer: MEDICAID

## 2024-04-17 ENCOUNTER — APPOINTMENT (OUTPATIENT)
Dept: RADIOLOGY | Facility: MEDICAL CENTER | Age: 64
End: 2024-04-17
Attending: EMERGENCY MEDICINE
Payer: MEDICAID

## 2024-04-17 ENCOUNTER — HOSPITAL ENCOUNTER (EMERGENCY)
Facility: MEDICAL CENTER | Age: 64
End: 2024-04-17
Attending: EMERGENCY MEDICINE
Payer: MEDICAID

## 2024-04-17 ENCOUNTER — APPOINTMENT (OUTPATIENT)
Dept: PHYSICAL THERAPY | Facility: REHABILITATION | Age: 64
End: 2024-04-17
Attending: INTERNAL MEDICINE
Payer: MEDICAID

## 2024-04-17 VITALS
WEIGHT: 184 LBS | RESPIRATION RATE: 16 BRPM | OXYGEN SATURATION: 98 % | HEIGHT: 68 IN | SYSTOLIC BLOOD PRESSURE: 140 MMHG | HEART RATE: 62 BPM | BODY MASS INDEX: 27.89 KG/M2 | TEMPERATURE: 98.6 F | DIASTOLIC BLOOD PRESSURE: 80 MMHG

## 2024-04-17 DIAGNOSIS — R10.84 GENERALIZED ABDOMINAL PAIN: ICD-10-CM

## 2024-04-17 DIAGNOSIS — N39.0 ACUTE UTI: ICD-10-CM

## 2024-04-17 LAB
ALBUMIN SERPL BCP-MCNC: 3.1 G/DL (ref 3.2–4.9)
ALBUMIN/GLOB SERPL: 0.8 G/DL
ALP SERPL-CCNC: 211 U/L (ref 30–99)
ALT SERPL-CCNC: 19 U/L (ref 2–50)
ANION GAP SERPL CALC-SCNC: 8 MMOL/L (ref 7–16)
APPEARANCE UR: CLEAR
APTT PPP: 36.2 SEC (ref 24.7–36)
AST SERPL-CCNC: 28 U/L (ref 12–45)
BACTERIA #/AREA URNS HPF: ABNORMAL /HPF
BASOPHILS # BLD AUTO: 1.2 % (ref 0–1.8)
BASOPHILS # BLD: 0.06 K/UL (ref 0–0.12)
BILIRUB SERPL-MCNC: 0.6 MG/DL (ref 0.1–1.5)
BILIRUB UR QL STRIP.AUTO: NEGATIVE
BUN SERPL-MCNC: 26 MG/DL (ref 8–22)
CALCIUM ALBUM COR SERPL-MCNC: 9.2 MG/DL (ref 8.5–10.5)
CALCIUM SERPL-MCNC: 8.5 MG/DL (ref 8.5–10.5)
CHLORIDE SERPL-SCNC: 113 MMOL/L (ref 96–112)
CO2 SERPL-SCNC: 19 MMOL/L (ref 20–33)
COLOR UR: YELLOW
CREAT SERPL-MCNC: 0.79 MG/DL (ref 0.5–1.4)
EOSINOPHIL # BLD AUTO: 0.3 K/UL (ref 0–0.51)
EOSINOPHIL NFR BLD: 6.1 % (ref 0–6.9)
EPI CELLS #/AREA URNS HPF: NEGATIVE /HPF
ERYTHROCYTE [DISTWIDTH] IN BLOOD BY AUTOMATED COUNT: 56.2 FL (ref 35.9–50)
GFR SERPLBLD CREATININE-BSD FMLA CKD-EPI: 83 ML/MIN/1.73 M 2
GLOBULIN SER CALC-MCNC: 3.9 G/DL (ref 1.9–3.5)
GLUCOSE SERPL-MCNC: 127 MG/DL (ref 65–99)
GLUCOSE UR STRIP.AUTO-MCNC: NEGATIVE MG/DL
HCT VFR BLD AUTO: 28.1 % (ref 37–47)
HGB BLD-MCNC: 8.9 G/DL (ref 12–16)
HYALINE CASTS #/AREA URNS LPF: ABNORMAL /LPF
IMM GRANULOCYTES # BLD AUTO: 0.02 K/UL (ref 0–0.11)
IMM GRANULOCYTES NFR BLD AUTO: 0.4 % (ref 0–0.9)
INR PPP: 1.26 (ref 0.87–1.13)
KETONES UR STRIP.AUTO-MCNC: NEGATIVE MG/DL
LEUKOCYTE ESTERASE UR QL STRIP.AUTO: ABNORMAL
LIPASE SERPL-CCNC: 34 U/L (ref 11–82)
LYMPHOCYTES # BLD AUTO: 1.12 K/UL (ref 1–4.8)
LYMPHOCYTES NFR BLD: 22.6 % (ref 22–41)
MCH RBC QN AUTO: 28.2 PG (ref 27–33)
MCHC RBC AUTO-ENTMCNC: 31.7 G/DL (ref 32.2–35.5)
MCV RBC AUTO: 88.9 FL (ref 81.4–97.8)
MICRO URNS: ABNORMAL
MONOCYTES # BLD AUTO: 0.38 K/UL (ref 0–0.85)
MONOCYTES NFR BLD AUTO: 7.7 % (ref 0–13.4)
NEUTROPHILS # BLD AUTO: 3.07 K/UL (ref 1.82–7.42)
NEUTROPHILS NFR BLD: 62 % (ref 44–72)
NITRITE UR QL STRIP.AUTO: POSITIVE
NRBC # BLD AUTO: 0 K/UL
NRBC BLD-RTO: 0 /100 WBC (ref 0–0.2)
PH UR STRIP.AUTO: 6 [PH] (ref 5–8)
PLATELET # BLD AUTO: 127 K/UL (ref 164–446)
PMV BLD AUTO: 12.4 FL (ref 9–12.9)
POTASSIUM SERPL-SCNC: 5 MMOL/L (ref 3.6–5.5)
PROT SERPL-MCNC: 7 G/DL (ref 6–8.2)
PROT UR QL STRIP: NEGATIVE MG/DL
PROTHROMBIN TIME: 16 SEC (ref 12–14.6)
RBC # BLD AUTO: 3.16 M/UL (ref 4.2–5.4)
RBC # URNS HPF: ABNORMAL /HPF
RBC UR QL AUTO: NEGATIVE
SODIUM SERPL-SCNC: 140 MMOL/L (ref 135–145)
SP GR UR STRIP.AUTO: 1.01
UROBILINOGEN UR STRIP.AUTO-MCNC: 0.2 MG/DL
WBC # BLD AUTO: 5 K/UL (ref 4.8–10.8)
WBC #/AREA URNS HPF: ABNORMAL /HPF

## 2024-04-17 PROCEDURE — 85610 PROTHROMBIN TIME: CPT

## 2024-04-17 PROCEDURE — 85730 THROMBOPLASTIN TIME PARTIAL: CPT

## 2024-04-17 PROCEDURE — 74176 CT ABD & PELVIS W/O CONTRAST: CPT

## 2024-04-17 PROCEDURE — 99285 EMERGENCY DEPT VISIT HI MDM: CPT

## 2024-04-17 PROCEDURE — 83690 ASSAY OF LIPASE: CPT

## 2024-04-17 PROCEDURE — 80053 COMPREHEN METABOLIC PANEL: CPT

## 2024-04-17 PROCEDURE — 700102 HCHG RX REV CODE 250 W/ 637 OVERRIDE(OP): Mod: UD | Performed by: EMERGENCY MEDICINE

## 2024-04-17 PROCEDURE — 81001 URINALYSIS AUTO W/SCOPE: CPT

## 2024-04-17 PROCEDURE — A9270 NON-COVERED ITEM OR SERVICE: HCPCS | Mod: UD | Performed by: EMERGENCY MEDICINE

## 2024-04-17 PROCEDURE — 36415 COLL VENOUS BLD VENIPUNCTURE: CPT

## 2024-04-17 PROCEDURE — 85025 COMPLETE CBC W/AUTO DIFF WBC: CPT

## 2024-04-17 RX ORDER — NITROFURANTOIN 25; 75 MG/1; MG/1
100 CAPSULE ORAL 2 TIMES DAILY WITH MEALS
Status: COMPLETED | OUTPATIENT
Start: 2024-04-17 | End: 2024-04-17

## 2024-04-17 RX ORDER — NITROFURANTOIN 25; 75 MG/1; MG/1
100 CAPSULE ORAL 2 TIMES DAILY
Qty: 10 CAPSULE | Refills: 0 | Status: ACTIVE | OUTPATIENT
Start: 2024-04-17 | End: 2024-04-22

## 2024-04-17 RX ORDER — NITROFURANTOIN 25; 75 MG/1; MG/1
100 CAPSULE ORAL 2 TIMES DAILY
Qty: 10 CAPSULE | Refills: 0 | Status: ACTIVE | OUTPATIENT
Start: 2024-04-17 | End: 2024-04-17

## 2024-04-17 RX ADMIN — NITROFURANTOIN MONOHYDRATE/MACROCRYSTALS 100 MG: 75; 25 CAPSULE ORAL at 16:59

## 2024-04-17 ASSESSMENT — FIBROSIS 4 INDEX: FIB4 SCORE: 2.55

## 2024-04-17 NOTE — ED PROVIDER NOTES
ED Provider Note    CHIEF COMPLAINT  Chief Complaint   Patient presents with    Abdominal Pain     Generalized abdominal pain for several days    Painful Urination     +blood in urine       EXTERNAL RECORDS REVIEWED  Other reviewed a discharge summary from January 22, 2024.  The patient was admitted for chronically infected left knee arthroplasty which was subsequently removed via amputation above the knee.    HPI/ROS    April Alicia is a 64 y.o. female who presents with abdominal pain.  Patient states over the last couple of days she developed progressive distention and abdominal discomfort.  She has not had any vomiting.  She has noted some blood in her urine but she does not have any dysuria.  She has no associated fevers.  She states she just left rehabilitation after she unfortunately had to have amputation above the left knee for an infection to her prosthesis.  The patient has not had any diarrhea nor constipation.    PAST MEDICAL HISTORY   has a past medical history of Abnormal finding of lung (12/27/2022), Acute exacerbation of chronic obstructive pulmonary disease (COPD) ([facility]) (01/27/2020), Alcoholic cirrhosis ([facility]), Arthritis, ASTHMA, Atrial fibrillation ([facility]), Dry eyes (11/16/2017), Edentulous, Emphysema of lung ([facility]), H/O: hysterectomy (12/05/2019), Heart burn, Hepatic encephalopathy ([facility]) (12/27/2022), Hepatitis C infection (07/28/2022), History of rheumatic fever (09/04/2017), Hypertension, 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[facility]), Medication overuse headache (08/15/2017), Mitral regurgitation, Pancytopenia ([facility]) (07/26/2022), Pneumonia (02/2020), Primary osteoarthritis of left knee (08/25/2020), Prosthetic joint infection ([facility]) (2018), Protein-calorie malnutrition, severe ([facility]) (08/01/2022), Right leg DVT ([facility]) (2018), Seizure disorder ([facility]), Septic arthritis of knee, left ([facility]) (07/19/2022), Septic arthritis of shoulder, left ([facility]) (07/17/2022), Septic shock due to Pseudomonas species ([facility])  "(10/30/2023), and Type 2 diabetes mellitus (HCC) (2022).    SURGICAL HISTORY   has a past surgical history that includes gyn surgery (); gyn surgery (); colonoscopy with clipping (10/28/2015); colonoscopy with sclerotherapy (10/28/2015); colonoscopy with tattooing (10/28/2015); irrigation & debridement ortho (Right, 9/3/2017); knee arthroplasty total (Right, ); total knee arthroplasty (Left, 2020); irrigation & debridement general (Left, 2022); revise knee joint replace,all parts (Left, 2022); revise knee joint replace,all parts (Left, 2022); irrigation & debridement general (Left, 2022); total knee arthroplasty (Left, 10/30/2023); and knee amputation above (Left, 2024).    FAMILY HISTORY  Family History   Problem Relation Age of Onset    Diabetes Mother     Seizures Father     Heart Attack Father 45    Diabetes Brother     Alcohol abuse Brother     Dementia Brother     Diabetes Brother        SOCIAL HISTORY  Social History     Tobacco Use    Smoking status: Former     Current packs/day: 0.00     Types: Cigarettes     Quit date: 2016     Years since quittin.3    Smokeless tobacco: Former     Quit date: 2021    Tobacco comments:     a few a day when I can   Vaping Use    Vaping Use: Never used   Substance and Sexual Activity    Alcohol use: Not Currently     Comment: hx of AUD    Drug use: Not Currently    Sexual activity: Not Currently     Partners: Male     Birth control/protection: Post-Menopausal     Comment: Has boyfriend, not currently sexually active       CURRENT MEDICATIONS  Home Medications    **Home medications have not yet been reviewed for this encounter**         ALLERGIES  No Known Allergies    PHYSICAL EXAM  VITAL SIGNS: /63   Pulse 68   Temp 37.6 °C (99.6 °F) (Temporal)   Resp 20   Ht 1.727 m (5' 8\")   Wt 83.5 kg (184 lb)   LMP 2008   SpO2 97%   BMI 27.98 kg/m²    In general the patient appears chronically ill    HEENT " unremarkable    Pulmonary the patient's lungs are clear to auscultation bilaterally    Cardiovascular S1-S2 with a regular rate and rhythm    GI the patient has significant distention with diffuse tenderness and hypoactive bowel sounds    Skin no pallor no erythema    Neurologic examination is grossly intact    LABS  Results for orders placed or performed during the hospital encounter of 04/17/24   CBC with Differential   Result Value Ref Range    WBC 5.0 4.8 - 10.8 K/uL    RBC 3.16 (L) 4.20 - 5.40 M/uL    Hemoglobin 8.9 (L) 12.0 - 16.0 g/dL    Hematocrit 28.1 (L) 37.0 - 47.0 %    MCV 88.9 81.4 - 97.8 fL    MCH 28.2 27.0 - 33.0 pg    MCHC 31.7 (L) 32.2 - 35.5 g/dL    RDW 56.2 (H) 35.9 - 50.0 fL    Platelet Count 127 (L) 164 - 446 K/uL    MPV 12.4 9.0 - 12.9 fL    Neutrophils-Polys 62.00 44.00 - 72.00 %    Lymphocytes 22.60 22.00 - 41.00 %    Monocytes 7.70 0.00 - 13.40 %    Eosinophils 6.10 0.00 - 6.90 %    Basophils 1.20 0.00 - 1.80 %    Immature Granulocytes 0.40 0.00 - 0.90 %    Nucleated RBC 0.00 0.00 - 0.20 /100 WBC    Neutrophils (Absolute) 3.07 1.82 - 7.42 K/uL    Lymphs (Absolute) 1.12 1.00 - 4.80 K/uL    Monos (Absolute) 0.38 0.00 - 0.85 K/uL    Eos (Absolute) 0.30 0.00 - 0.51 K/uL    Baso (Absolute) 0.06 0.00 - 0.12 K/uL    Immature Granulocytes (abs) 0.02 0.00 - 0.11 K/uL    NRBC (Absolute) 0.00 K/uL   Complete Metabolic Panel   Result Value Ref Range    Sodium 140 135 - 145 mmol/L    Potassium 5.0 3.6 - 5.5 mmol/L    Chloride 113 (H) 96 - 112 mmol/L    Co2 19 (L) 20 - 33 mmol/L    Anion Gap 8.0 7.0 - 16.0    Glucose 127 (H) 65 - 99 mg/dL    Bun 26 (H) 8 - 22 mg/dL    Creatinine 0.79 0.50 - 1.40 mg/dL    Calcium 8.5 8.5 - 10.5 mg/dL    Correct Calcium 9.2 8.5 - 10.5 mg/dL    AST(SGOT) 28 12 - 45 U/L    ALT(SGPT) 19 2 - 50 U/L    Alkaline Phosphatase 211 (H) 30 - 99 U/L    Total Bilirubin 0.6 0.1 - 1.5 mg/dL    Albumin 3.1 (L) 3.2 - 4.9 g/dL    Total Protein 7.0 6.0 - 8.2 g/dL    Globulin 3.9 (H) 1.9 - 3.5  g/dL    A-G Ratio 0.8 g/dL   Lipase   Result Value Ref Range    Lipase 34 11 - 82 U/L   Urinalysis    Specimen: Urine   Result Value Ref Range    Micro Urine Req Microscopic    ESTIMATED GFR   Result Value Ref Range    GFR (CKD-EPI) 83 >60 mL/min/1.73 m 2   APTT   Result Value Ref Range    APTT 36.2 (H) 24.7 - 36.0 sec   PROTHROMBIN TIME (INR)   Result Value Ref Range    PT 16.0 (H) 12.0 - 14.6 sec    INR 1.26 (H) 0.87 - 1.13       I have independently interpreted this EKG    RADIOLOGY/PROCEDURES     Radiologist interpretation:  CT-RENAL COLIC EVALUATION(A/P W/O)   Final Result      1.  Cardiomegaly. Note, enlarged IVC may reflect elevated right heart pressures.   2.  Cirrhotic liver and associated findings consistent with portal hypertension including recanalization of the umbilical vein, dilated main portal vein measuring about 19 mm ascites, venous collaterals in the abdominal wall, ascites.   3.  Gallbladder wall thickening likely associated with liver disease with or without hypoalbuminemia.   4.  Diffuse anasarca in the soft tissues of the abdominal and pelvic wall as well as hazy edema opacifying mesenteric fat.   5.  No renal or ureteral calculi or obvious renal mass lesion.          COURSE & MEDICAL DECISION MAKING  This is a 64-year-old female who presents with abdominal discomfort as well as distention.  I did perform a CT scan as she is also had some hematuria by history and there is no evidence of ureterolithiasis.  The patient does have a lot of incidental findings including the cardiomegaly, cirrhotic liver disease with portal hypertension, gallbladder wall thickening, and diffuse anasarca.  I suspect her distention is from the anasarca in reviewing the CT scan I do not see a lot of fluid where the patient would be amenable to paracentesis.  Her abdomen is nonsurgical.  The patient's anemia is stable.  She is slightly acidotic with a bicarb of 19 but her baseline is in the 20 region and therefore this  is not far off her baseline.  Her BUN is up a little bit but I am reluctant to give IV fluids a suspect she will third space the fluids therefore we will hold off on IV fluids.    Patient's urinalysis did come back positive for urinary tract infection.  Will place the patient on Macrobid for 5 days.  The patient will utilize sitz bath's.  The patient is instructed to follow-up with her primary care doctor in 5 to 7 days and return if she is acutely worse.    FINAL DIAGNOSIS  1.  Abdominal pain  2.  Chronic liver disease  3.  Stable anemia  4.  Urinary tract infection    Disposition  The patient will be discharged in stable condition       Electronically signed by: Jonathan Orozco M.D., 4/17/2024 2:24 PM

## 2024-04-17 NOTE — ED TRIAGE NOTES
April Alicia  64 y.o. female  Chief Complaint   Patient presents with    Abdominal Pain     Generalized abdominal pain for several days    Painful Urination     +blood in urine       Vitals:    04/17/24 1322   BP: 137/75   Pulse: 73   Resp: 20   Temp: 37.6 °C (99.6 °F)   SpO2: 98%       Patient educated on triage process and encouraged to alert staff of any changes in condition.    Pt to triage via wheelchair

## 2024-04-18 NOTE — ED NOTES
Pt verbalizes discharge instructions. Pt reports she has a ride home. Vs stable. Pt ready for dc.

## 2024-04-23 ENCOUNTER — PHYSICAL THERAPY (OUTPATIENT)
Dept: PHYSICAL THERAPY | Facility: REHABILITATION | Age: 64
End: 2024-04-23
Attending: INTERNAL MEDICINE
Payer: MEDICAID

## 2024-04-23 DIAGNOSIS — S78.112A AMPUTATION OF LEFT LOWER EXTREMITY ABOVE KNEE UPON EXAMINATION (HCC): ICD-10-CM

## 2024-04-23 PROCEDURE — 999999 HB NO CHARGE

## 2024-04-23 NOTE — OP THERAPY EVALUATION
Outpatient Physical Therapy  INITIAL EVALUATION    [facility] Physical Therapy [address]  [address]  [facility] [address]  Phone:  [phone]  Fax:  [phone]    Date of Evaluation: 04/23/2024    Patient: April Alicia  YOB: 1960  MRN: [id number]     Referring Provider: Anum Gatica M.D.  [address] [place] [id number]   Referring Diagnosis Complete traumatic amputation at level between left hip and knee, subsequent encounter [S78.112D]     Time Calculation  Start time: 1330  Stop time: 1415 Time Calculation (min): 45 minutes         Chief Complaint: No chief complaint on file.    Visit Diagnoses     ICD-10-CM   1. Amputation of left lower extremity above knee upon examination ([facility])  S78.112A         Date of onset of impairment: Multiple active episodes found    Subjective    Past Medical History:   Diagnosis Date    Abnormal finding of lung 12/27/2022    Acute exacerbation of chronic obstructive pulmonary disease (COPD) ([facility]) 01/27/2020    Alcoholic cirrhosis ([facility])     Arthritis     OA in knees    ASTHMA     Atrial fibrillation ([facility])     Dry eyes 11/16/2017    Edentulous     upper and lower dentures    Emphysema of lung ([facility])     H/O: hysterectomy 12/05/2019    Heart burn     left knee    Hepatic encephalopathy (HCC) 12/27/2022    Hepatitis C infection 07/28/2022    History of rheumatic fever 09/04/2017    Hypertension     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[facility])     Recurrent, L knee, x4 with surgical washout    Medication overuse headache 08/15/2017    Mitral regurgitation     Pancytopenia ([facility]) 07/26/2022    Pneumonia 02/2020    Primary osteoarthritis of left knee 08/25/2020    Prosthetic joint infection ([facility]) 2018    Right knee after total knee    Protein-calorie malnutrition, severe ([facility]) 08/01/2022    Right leg DVT ([facility]) 2018    acute, resolved    Seizure disorder ([facility])     Septic arthritis of knee, left ([facility]) 07/19/2022    Septic arthritis of shoulder, left  ([facility]) 07/17/2022    Septic shock due to Pseudomonas species ([facility]) 10/30/2023    Type 2 diabetes mellitus ([facility]) 12/27/2022     Past Surgical History:   Procedure Laterality Date    KNEE AMPUTATION ABOVE Left 1/16/2024    Procedure: LEFT ABOVE KNEE AMPUTATION;  Surgeon: Obdulio Gray M.D.;  Location: [facility];  Service: Orthopedics    PB TOTAL KNEE ARTHROPLASTY Left 10/30/2023    Procedure: LEFT TOTAL KNEE ARTHROPLASTY EXPLANTATION, INSERTION OF ANTIBIOTIC SPACER, AND APPLICATION OF INCISIONAL WOUND VACUUM;  Surgeon: Wild Luz M.D.;  Location: [facility];  Service: Orthopedics    PB REVISE KNEE JOINT REPLACE,ALL PARTS Left 12/28/2022    Procedure: REVISION, TOTAL ARTHROPLASTY, KNEE, ALL COMPONENTS-LEFT;  Surgeon: Wild Luz M.D.;  Location: [facility];  Service: Orthopedics    IRRIGATION & DEBRIDEMENT GENERAL Left 12/28/2022    Procedure: IRRIGATION AND DEBRIDEMENT, WOUND;  Surgeon: Wild Luz M.D.;  Location: [facility];  Service: Orthopedics    PB REVISE KNEE JOINT REPLACE,ALL PARTS Left 7/19/2022    Procedure: REVISION, TOTAL ARTHROPLASTY, KNEE, ALL COMPONENTS - ANTIBIOTIC SPACER;  Surgeon: Wild Luz M.D.;  Location: [facility];  Service: Orthopedics    IRRIGATION & DEBRIDEMENT GENERAL Left 7/17/2022    Procedure: IRRIGATION AND DEBRIDEMENT, SHOULDER;  Surgeon: Obdulio Gray M.D.;  Location: [facility];  Service: Orthopedics    PB TOTAL KNEE ARTHROPLASTY Left 8/24/2020    Procedure: ARTHROPLASTY, KNEE, TOTAL;  Surgeon: Víctor Faustin M.D.;  Location: [facility];  Service: Orthopedics    KNEE ARTHROPLASTY TOTAL Right 2018    IRRIGATION & DEBRIDEMENT ORTHO Right 9/3/2017    Procedure: IRRIGATION & DEBRIDEMENT ORTHO, POLY EXCHANGE;  Surgeon: Jerome Russell M.D.;  Location: [facility];  Service: Orthopedics    COLONOSCOPY WITH CLIPPING  10/28/2015    Procedure: COLONOSCOPY WITH CLIPPING;   Surgeon: Ruben Colon M.D.;  Location: [facility];  Service:     COLONOSCOPY WITH SCLEROTHERAPY  10/28/2015    Procedure: COLONOSCOPY WITH SCLEROTHERAPY;  Surgeon: Ruben Colon M.D.;  Location: [facility];  Service:     COLONOSCOPY WITH TATTOOING  10/28/2015    Procedure: COLONOSCOPY WITH TATTOOING;  Surgeon: Ruben Colon M.D.;  Location: [facility];  Service:     GYN SURGERY      hysteroscoopy    GYN SURGERY  1982    tubal ligation     Social History     Tobacco Use    Smoking status: Former     Current packs/day: 0.00     Types: Cigarettes     Quit date: 2016     Years since quittin.3    Smokeless tobacco: Former     Quit date: 2021    Tobacco comments:     a few a day when I can   Substance Use Topics    Alcohol use: Not Currently     Comment: hx of AUD     Family and Occupational History     Socioeconomic History    Marital status: Single     Spouse name: Not on file    Number of children: 2    Years of education: Not on file    Highest education level: 11th grade   Occupational History    Occupation: CNA       Objective    Exercises/Treatment  Time-based treatments/modalities:           Assessment, Response and Plan:   Assessment details:  Pt is 65 yo female presented to outpatient gym with sig lethargy, slurred speech, difficulty maintaining eyes open.  BP taken: 100/70, HR: 60BPM, as per chart lower than normal vitals.  Pt given water and able to give hx of recently seeing MD yesterday for bleeding in rectum and reported bleeding has stopped and is stable, directed to come back if worsens.  Pt also reported inc in oxycodone to 10mg daily, reports daughter gives her meds, but was concerned about it being too much too.  Pt reports inc d/t in B shoulder pain, recently had a fall in BR at home, hx of recent clavicle fx.  TC made to daughter, edu on pt's low BP, drowsiness and concerns regarding pt being at appt alone.  Daughter inquired with  "pt as to wishing to go to ER, pt adamantly stating \"I don't need help, I can figure this out of my own, my grand daughter(16yo) will be there when I get home. \"  Assisted pt to call transport for assist home and Supervisor sat with pt until transport arrived.  Pt did state she was nauseated at end of session.  Discussed with pt that if oxy is increased these could be side effects from it vs recent UTI and antibiotics.  But to speak with MD regarding pain med recommending .  45min spent with pt d/t drowsiness, intermittent confusion d/t drowsiness and obtaining pt's hx.       Functional Assessment Used        Referring provider co-signature:  I have reviewed this plan of care and my co-signature certifies the need for services.    Certification Period: 04/23/2024 to    Physician Signature: ________________________________ Date: ______________                 "

## 2024-05-08 ENCOUNTER — PHYSICAL THERAPY (OUTPATIENT)
Dept: PHYSICAL THERAPY | Facility: REHABILITATION | Age: 64
End: 2024-05-08
Attending: INTERNAL MEDICINE
Payer: MEDICAID

## 2024-05-08 DIAGNOSIS — S78.112A ABOVE-KNEE AMPUTATION OF LEFT LOWER EXTREMITY (HCC): ICD-10-CM

## 2024-05-08 SDOH — ECONOMIC STABILITY: GENERAL: QUALITY OF LIFE: GOOD

## 2024-05-08 ASSESSMENT — ENCOUNTER SYMPTOMS
QUALITY: TINGLING
PAIN SCALE AT LOWEST: 0
QUALITY: BURNING
PAIN SCALE: 7
PAIN SCALE AT HIGHEST: 9

## 2024-05-08 ASSESSMENT — ACTIVITIES OF DAILY LIVING (ADL): POOR_BALANCE: 1

## 2024-05-08 NOTE — OP THERAPY EVALUATION
"  Outpatient Physical Therapy  INITIAL EVALUATION    [facility] Physical Therapy [address].  [address]  [facility] [address]  Phone:  [phone]  Fax:  [phone]    Date of Evaluation: 05/08/2024    Patient: April Alicia  YOB: 1960  MRN: [id number]     Referring Provider: Anum Gatica M.D.  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    Visit Diagnoses     ICD-10-CM   1. 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  Pt was hospitalzied for 1.5 weeks, D/c'd to SNF Was at SNF for a couple months, d/c'd to home on 3/21/2024.  C  LOF: Since being home has been showering w/ shower chair and toileting indep.  Household duties performing: dishes, washing countertops, sweeping, mopping, reaching over the fridge for food seated in w/c.  Uses a reacher to clean.  Pt reporting standing 25% of day, reports goal is to increase walking and goal to walking outside at the [place].  Per pt amb with a crutch can walk approx 2 blocks.  Does report a fall the first week at home in bathroom and shower.  Now has a grab bar & doesn't try to walk indep. In/out of home 2 steps, 4\" & 8\" per pt.     Per [facility] \"April Alicia is a 63 y.o. female admitted 1/11/2024 with recurrent admissions for chronically infected left knee arthroplasty who has failed multiple antibiotic spacers and multiple prolonged courses of IV antibiotics who was discharged from our hospital to [facility] on January 9, 2024 with no PICC line and possible IV daptomycin.  " "Patient returns from skilled nursing facility after being there 1 day with fevers and confusion.  Upon readmission from the ER during this hospital admission she was found to be COVID-positive but not hypoxic.  Her confusion is slightly improved and she has been admitted to the general medical floor and deferred surgery until 2024, which is her original surgical date.  She has been placed back on daptomycin and blood cultures which have been repeated are pending.  Per PT she is ambulating somewhat decently well but will remain in the hospital to her surgery will be done on .     Status post left transfemoral amputation, removal of nonabsorbable antibiotic drug delivery device from the intramedullary left femur on .  Infectious disease recommended to discontinue antibiotics and monitor amputation site for any evidence of infection.     Patient has completed her isolation from COVID.  She was found to have Klebsiella ESBL urinary tract infection and started on Bactrim.  She did develop some loose stools but was negative for C. difficile.  She had some intermittent lethargy especially after surgery.  Initially this was attributed to pain meds.  Her ammonia was also a little elevated at 154.  Her daily lactulose was increased to 3 times a day with improvement in mental status.\"  Quality of life:  Good  Pain:     Current pain ratin    At best pain ratin    At worst pain ratin    Quality:  Burning and tingling  Social Support:     Lives in:  One-story house    Lives with:  Adult children  Patient Goals:     Patient goals for therapy:  Improved balance, increased strength, decreased pain and independence with ADLs/IADLs      Past Medical History:   Diagnosis Date    Abnormal finding of lung 2022    Acute exacerbation of chronic obstructive pulmonary disease (COPD) ([facility]) 2020    Alcoholic cirrhosis (HCC)     Arthritis     OA in knees    ASTHMA     Atrial " fibrillation (HCC)     Dry eyes 11/16/2017    Edentulous     upper and lower dentures    Emphysema of lung ([facility])     H/O: hysterectomy 12/05/2019    Heart burn     left knee    Hepatic encephalopathy (HCC) 12/27/2022    Hepatitis C infection 07/28/2022    History of rheumatic fever 09/04/2017    Hypertension     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[facility])     Recurrent, L knee, x4 with surgical washout    Medication overuse headache 08/15/2017    Mitral regurgitation     Pancytopenia (HCC) 07/26/2022    Pneumonia 02/2020    Primary osteoarthritis of left knee 08/25/2020    Prosthetic joint infection ([facility]CC) 2018    Right knee after total knee    Protein-calorie malnutrition, severe ([facility]) 08/01/2022    Right leg DVT ([facility]) 2018    acute, resolved    Seizure disorder ([facility])     Septic arthritis of knee, left (HCC) 07/19/2022    Septic arthritis of shoulder, left ([facility]) 07/17/2022    Septic shock due to Pseudomonas species ([facility]) 10/30/2023    Type 2 diabetes mellitus ([facility]) 12/27/2022     Past Surgical History:   Procedure Laterality Date    KNEE AMPUTATION ABOVE Left 1/16/2024    Procedure: LEFT ABOVE KNEE AMPUTATION;  Surgeon: Obdulio Gray M.D.;  Location: [facility];  Service: Orthopedics    PB TOTAL KNEE ARTHROPLASTY Left 10/30/2023    Procedure: LEFT TOTAL KNEE ARTHROPLASTY EXPLANTATION, INSERTION OF ANTIBIOTIC SPACER, AND APPLICATION OF INCISIONAL WOUND VACUUM;  Surgeon: Wild Luz M.D.;  Location: [facility];  Service: Orthopedics    PB REVISE KNEE JOINT REPLACE,ALL PARTS Left 12/28/2022    Procedure: REVISION, TOTAL ARTHROPLASTY, KNEE, ALL COMPONENTS-LEFT;  Surgeon: Wild Luz M.D.;  Location: [facility];  Service: Orthopedics    IRRIGATION & DEBRIDEMENT GENERAL Left 12/28/2022    Procedure: IRRIGATION AND DEBRIDEMENT, WOUND;  Surgeon: Wild Luz M.D.;  Location: [facility];  Service: Orthopedics    PB REVISE KNEE JOINT REPLACE,ALL PARTS Left 7/19/2022    Procedure:  REVISION, TOTAL ARTHROPLASTY, KNEE, ALL COMPONENTS - ANTIBIOTIC SPACER;  Surgeon: Wild Luz M.D.;  Location: [facility];  Service: Orthopedics    IRRIGATION & DEBRIDEMENT GENERAL Left 2022    Procedure: IRRIGATION AND DEBRIDEMENT, SHOULDER;  Surgeon: Obdulio Gray M.D.;  Location: [facility];  Service: Orthopedics    PB TOTAL KNEE ARTHROPLASTY Left 2020    Procedure: ARTHROPLASTY, KNEE, TOTAL;  Surgeon: Víctor Faustin M.D.;  Location: [facility];  Service: Orthopedics    KNEE ARTHROPLASTY TOTAL Right 2018    IRRIGATION & DEBRIDEMENT ORTHO Right 9/3/2017    Procedure: IRRIGATION & DEBRIDEMENT ORTHO, POLY EXCHANGE;  Surgeon: Jerome Russell M.D.;  Location: [facility];  Service: Orthopedics    COLONOSCOPY WITH CLIPPING  10/28/2015    Procedure: COLONOSCOPY WITH CLIPPING;  Surgeon: Ruben Colon M.D.;  Location: [facility];  Service:     COLONOSCOPY WITH SCLEROTHERAPY  10/28/2015    Procedure: COLONOSCOPY WITH SCLEROTHERAPY;  Surgeon: Ruben Colon M.D.;  Location: [facility];  Service:     COLONOSCOPY WITH TATTOOING  10/28/2015    Procedure: COLONOSCOPY WITH TATTOOING;  Surgeon: Ruben Colon M.D.;  Location: [facility];  Service:     GYN SURGERY      hysteroscoopy    GYN SURGERY      tubal ligation     Social History     Tobacco Use    Smoking status: Former     Current packs/day: 0.00     Types: Cigarettes     Quit date: 2016     Years since quittin.3    Smokeless tobacco: Former     Quit date: 2021    Tobacco comments:     a few a day when I can   Substance Use Topics    Alcohol use: Not Currently     Comment: hx of AUD     Family and Occupational History     Socioeconomic History    Marital status: Single     Spouse name: Not on file    Number of children: 2    Years of education: Not on file    Highest education level: 11th grade   Occupational History    Occupation:  "CNA       Objective     Active Range of Motion   Left Hip   Extension: 5 degrees     Passive Range of Motion   Left Hip   Extension: 10 (mild abd tightness) degrees     Strength:      Left Hip   Planes of Motion   Flexion: 4  Extension: 4  Abduction: 4-    Right Hip   Planes of Motion   Flexion: 3  Extension: 3  Ambulation     Comments   Gait TBA        Therapeutic Exercises (CPT 33567):     1. single LE Bridging, x10, HEP    2. B hip abd sidelying, x5, HEP    3. B Prone hip glut max, 5 x 2 ea, HEP, R>L diff    4. Prone R LE HS/glut raise, 10 x2, HEP      Time-based treatments/modalities:           Assessment, Response and Plan:   Impairments: abnormal gait, impaired functional mobility, impaired balance, impaired physical strength, lacks appropriate home exercise program, limited ADL's, limited mobility and safety issue    Assessment details:  Pt is a 65 yo female c/c of impaired balance and functional mobility s/p L AKA on 1/11/2024  Initial injury is described chronically infected left knee arthroplasty who has failed multiple antibiotic spacers and multiple prolonged courses of IV antibiotics. Pt reports she over medicated herself the last PT session, taking double the oxy as prescribed. Stating \"I don't want to do that, my daughter has taken them away and I'm happy about that, I want to get off the pain meds.\"  Pertinent clinical findings include: impaired B hip strength, impaired CV endurance, impaired balance and functional mobility.  Pt is motivated, has supportive daughter in which she lives with, goals are to return to indep gait with use of prosthesis.  Pt is limited by the above mentioned impairments and would benefit from skilled PT to address these impairments.   Barriers to therapy:  Psychosocial  Prognosis: fair    Goals:   Short Term Goals:   1. Pt will be compliant with HEP  2. Pt will be able to participate in 5xSTS, TUG, 2MWT with FWW  3. Pt will demo indep of limb wrapping/sleeve donning/doffing " for compression & shaping care.       Short term goal time span:  2-4 weeks      Long Term Goals:    1. Pt will be able to amb household distance with fWW And prosthesis indep  2. Pt will be able to demo improvement in 5xSTS <16sec to decreased risk for falls  3. Pt will be able to demo TUG to <14sec to dec risk for falls  Long term goal time span:  6-8 weeks    Plan:   Planned therapy interventions:  Neuromuscular Re-education (CPT 12432) and Therapeutic Exercise (CPT 63548)  Other planned therapy interventions:  97112 x2, 97110x2  Frequency:  1x week (1-2x)  Duration in weeks:  12  Discussed with:  Patient      Functional Assessment Used    Needs ABC    Referring provider co-signature:  I have reviewed this plan of care and my co-signature certifies the need for services.    Certification Period: 05/08/2024 to  08/06/24    Physician Signature: ________________________________ Date: ______________

## 2024-05-10 ENCOUNTER — APPOINTMENT (OUTPATIENT)
Dept: PHYSICAL THERAPY | Facility: REHABILITATION | Age: 64
End: 2024-05-10
Attending: INTERNAL MEDICINE
Payer: MEDICAID

## 2024-05-12 ENCOUNTER — HOSPITAL ENCOUNTER (INPATIENT)
Facility: MEDICAL CENTER | Age: 64
LOS: 2 days | DRG: 434 | End: 2024-05-14
Attending: EMERGENCY MEDICINE | Admitting: STUDENT IN AN ORGANIZED HEALTH CARE EDUCATION/TRAINING PROGRAM
Payer: MEDICAID

## 2024-05-12 ENCOUNTER — APPOINTMENT (OUTPATIENT)
Dept: RADIOLOGY | Facility: MEDICAL CENTER | Age: 64
DRG: 434 | End: 2024-05-12
Attending: EMERGENCY MEDICINE
Payer: MEDICAID

## 2024-05-12 DIAGNOSIS — R60.0 BILATERAL LEG EDEMA: ICD-10-CM

## 2024-05-12 DIAGNOSIS — K70.31 ASCITES DUE TO ALCOHOLIC CIRRHOSIS (HCC): ICD-10-CM

## 2024-05-12 DIAGNOSIS — I48.0 PAROXYSMAL ATRIAL FIBRILLATION (HCC): ICD-10-CM

## 2024-05-12 DIAGNOSIS — K70.31 ALCOHOLIC CIRRHOSIS OF LIVER WITH ASCITES (HCC): ICD-10-CM

## 2024-05-12 DIAGNOSIS — D69.6 THROMBOCYTOPENIA (HCC): ICD-10-CM

## 2024-05-12 DIAGNOSIS — R60.1 ANASARCA: ICD-10-CM

## 2024-05-12 PROBLEM — I10 PRIMARY HYPERTENSION: Status: ACTIVE | Noted: 2020-01-28

## 2024-05-12 LAB
ALBUMIN SERPL BCP-MCNC: 2.8 G/DL (ref 3.2–4.9)
ALBUMIN/GLOB SERPL: 0.7 G/DL
ALP SERPL-CCNC: 159 U/L (ref 30–99)
ALT SERPL-CCNC: 6 U/L (ref 2–50)
ANION GAP SERPL CALC-SCNC: 9 MMOL/L (ref 7–16)
APTT PPP: 34.3 SEC (ref 24.7–36)
AST SERPL-CCNC: 18 U/L (ref 12–45)
BASOPHILS # BLD AUTO: 0.7 % (ref 0–1.8)
BASOPHILS # BLD: 0.04 K/UL (ref 0–0.12)
BILIRUB SERPL-MCNC: 0.6 MG/DL (ref 0.1–1.5)
BUN SERPL-MCNC: 17 MG/DL (ref 8–22)
CALCIUM ALBUM COR SERPL-MCNC: 9.5 MG/DL (ref 8.5–10.5)
CALCIUM SERPL-MCNC: 8.5 MG/DL (ref 8.5–10.5)
CHLORIDE SERPL-SCNC: 116 MMOL/L (ref 96–112)
CO2 SERPL-SCNC: 16 MMOL/L (ref 20–33)
CREAT SERPL-MCNC: 0.76 MG/DL (ref 0.5–1.4)
EKG IMPRESSION: NORMAL
EOSINOPHIL # BLD AUTO: 0.35 K/UL (ref 0–0.51)
EOSINOPHIL NFR BLD: 6.1 % (ref 0–6.9)
ERYTHROCYTE [DISTWIDTH] IN BLOOD BY AUTOMATED COUNT: 51.8 FL (ref 35.9–50)
GFR SERPLBLD CREATININE-BSD FMLA CKD-EPI: 87 ML/MIN/1.73 M 2
GLOBULIN SER CALC-MCNC: 3.8 G/DL (ref 1.9–3.5)
GLUCOSE SERPL-MCNC: 112 MG/DL (ref 65–99)
HCT VFR BLD AUTO: 27.3 % (ref 37–47)
HGB BLD-MCNC: 8.5 G/DL (ref 12–16)
IMM GRANULOCYTES # BLD AUTO: 0.05 K/UL (ref 0–0.11)
IMM GRANULOCYTES NFR BLD AUTO: 0.9 % (ref 0–0.9)
INR PPP: 1.31 (ref 0.87–1.13)
LYMPHOCYTES # BLD AUTO: 1.37 K/UL (ref 1–4.8)
LYMPHOCYTES NFR BLD: 23.8 % (ref 22–41)
MCH RBC QN AUTO: 26.7 PG (ref 27–33)
MCHC RBC AUTO-ENTMCNC: 31.1 G/DL (ref 32.2–35.5)
MCV RBC AUTO: 85.8 FL (ref 81.4–97.8)
MONOCYTES # BLD AUTO: 0.48 K/UL (ref 0–0.85)
MONOCYTES NFR BLD AUTO: 8.3 % (ref 0–13.4)
NEUTROPHILS # BLD AUTO: 3.47 K/UL (ref 1.82–7.42)
NEUTROPHILS NFR BLD: 60.2 % (ref 44–72)
NRBC # BLD AUTO: 0 K/UL
NRBC BLD-RTO: 0 /100 WBC (ref 0–0.2)
NT-PROBNP SERPL IA-MCNC: 1590 PG/ML (ref 0–125)
PLATELET # BLD AUTO: 122 K/UL (ref 164–446)
PMV BLD AUTO: 10.7 FL (ref 9–12.9)
POTASSIUM SERPL-SCNC: 5.1 MMOL/L (ref 3.6–5.5)
PROT SERPL-MCNC: 6.6 G/DL (ref 6–8.2)
PROTHROMBIN TIME: 16.4 SEC (ref 12–14.6)
RBC # BLD AUTO: 3.18 M/UL (ref 4.2–5.4)
SODIUM SERPL-SCNC: 141 MMOL/L (ref 135–145)
TROPONIN T SERPL-MCNC: 19 NG/L (ref 6–19)
WBC # BLD AUTO: 5.8 K/UL (ref 4.8–10.8)

## 2024-05-12 PROCEDURE — 99223 1ST HOSP IP/OBS HIGH 75: CPT | Performed by: STUDENT IN AN ORGANIZED HEALTH CARE EDUCATION/TRAINING PROGRAM

## 2024-05-12 RX ORDER — OXYCODONE HYDROCHLORIDE 5 MG/1
5 TABLET ORAL EVERY 4 HOURS PRN
Status: DISCONTINUED | OUTPATIENT
Start: 2024-05-12 | End: 2024-05-14 | Stop reason: HOSPADM

## 2024-05-12 RX ORDER — FUROSEMIDE 10 MG/ML
20 INJECTION INTRAMUSCULAR; INTRAVENOUS ONCE
Status: COMPLETED | OUTPATIENT
Start: 2024-05-12 | End: 2024-05-12

## 2024-05-12 RX ORDER — LABETALOL HYDROCHLORIDE 5 MG/ML
10 INJECTION, SOLUTION INTRAVENOUS EVERY 4 HOURS PRN
Status: DISCONTINUED | OUTPATIENT
Start: 2024-05-12 | End: 2024-05-14 | Stop reason: HOSPADM

## 2024-05-12 RX ORDER — METOPROLOL SUCCINATE 25 MG/1
12.5 TABLET, EXTENDED RELEASE ORAL NIGHTLY
Status: DISCONTINUED | OUTPATIENT
Start: 2024-05-12 | End: 2024-05-14 | Stop reason: HOSPADM

## 2024-05-12 RX ORDER — MORPHINE SULFATE 4 MG/ML
4 INJECTION INTRAVENOUS ONCE
Status: COMPLETED | OUTPATIENT
Start: 2024-05-12 | End: 2024-05-12

## 2024-05-12 RX ORDER — ATORVASTATIN CALCIUM 20 MG/1
20 TABLET, FILM COATED ORAL NIGHTLY
Status: DISCONTINUED | OUTPATIENT
Start: 2024-05-12 | End: 2024-05-14 | Stop reason: HOSPADM

## 2024-05-12 RX ORDER — GABAPENTIN 300 MG/1
600 CAPSULE ORAL EVERY 8 HOURS
Status: DISCONTINUED | OUTPATIENT
Start: 2024-05-12 | End: 2024-05-14 | Stop reason: HOSPADM

## 2024-05-12 RX ORDER — OXYCODONE HYDROCHLORIDE 10 MG/1
10 TABLET ORAL EVERY 6 HOURS PRN
Status: DISCONTINUED | OUTPATIENT
Start: 2024-05-12 | End: 2024-05-14 | Stop reason: HOSPADM

## 2024-05-12 RX ORDER — LOSARTAN POTASSIUM 50 MG/1
100 TABLET ORAL DAILY
Status: DISCONTINUED | OUTPATIENT
Start: 2024-05-12 | End: 2024-05-14 | Stop reason: HOSPADM

## 2024-05-12 RX ORDER — LACTULOSE 20 G/30ML
15 SOLUTION ORAL DAILY
Status: DISCONTINUED | OUTPATIENT
Start: 2024-05-12 | End: 2024-05-14 | Stop reason: HOSPADM

## 2024-05-12 RX ORDER — OXYCODONE HYDROCHLORIDE 10 MG/1
10 TABLET ORAL EVERY 6 HOURS PRN
Status: ON HOLD | COMMUNITY
Start: 2024-04-21 | End: 2024-05-25

## 2024-05-12 RX ORDER — ENOXAPARIN SODIUM 100 MG/ML
40 INJECTION SUBCUTANEOUS DAILY
Status: DISCONTINUED | OUTPATIENT
Start: 2024-05-12 | End: 2024-05-12

## 2024-05-12 RX ORDER — FUROSEMIDE 10 MG/ML
40 INJECTION INTRAMUSCULAR; INTRAVENOUS
Status: DISCONTINUED | OUTPATIENT
Start: 2024-05-12 | End: 2024-05-14 | Stop reason: HOSPADM

## 2024-05-12 RX ORDER — SPIRONOLACTONE 25 MG/1
12.5 TABLET ORAL
Status: DISCONTINUED | OUTPATIENT
Start: 2024-05-12 | End: 2024-05-14 | Stop reason: HOSPADM

## 2024-05-12 RX ORDER — OMEPRAZOLE 20 MG/1
20 CAPSULE, DELAYED RELEASE ORAL DAILY
Status: DISCONTINUED | OUTPATIENT
Start: 2024-05-12 | End: 2024-05-14 | Stop reason: HOSPADM

## 2024-05-12 RX ADMIN — LOSARTAN POTASSIUM 100 MG: 50 TABLET, FILM COATED ORAL at 06:30

## 2024-05-12 RX ADMIN — LACTULOSE 15 ML: 20 SOLUTION ORAL at 09:07

## 2024-05-12 RX ADMIN — GABAPENTIN 600 MG: 300 CAPSULE ORAL at 20:03

## 2024-05-12 RX ADMIN — OXYCODONE HYDROCHLORIDE 10 MG: 10 TABLET ORAL at 23:54

## 2024-05-12 RX ADMIN — METOPROLOL SUCCINATE 12.5 MG: 25 TABLET, EXTENDED RELEASE ORAL at 20:04

## 2024-05-12 RX ADMIN — SPIRONOLACTONE 12.5 MG: 25 TABLET ORAL at 09:07

## 2024-05-12 RX ADMIN — GABAPENTIN 600 MG: 300 CAPSULE ORAL at 09:06

## 2024-05-12 RX ADMIN — APIXABAN 5 MG: 5 TABLET, FILM COATED ORAL at 18:12

## 2024-05-12 RX ADMIN — ATORVASTATIN CALCIUM 20 MG: 20 TABLET, FILM COATED ORAL at 20:03

## 2024-05-12 RX ADMIN — GABAPENTIN 600 MG: 300 CAPSULE ORAL at 14:43

## 2024-05-12 RX ADMIN — FUROSEMIDE 20 MG: 10 INJECTION, SOLUTION INTRAVENOUS at 05:11

## 2024-05-12 RX ADMIN — FUROSEMIDE 40 MG: 10 INJECTION, SOLUTION INTRAMUSCULAR; INTRAVENOUS at 18:12

## 2024-05-12 RX ADMIN — OMEPRAZOLE 20 MG: 20 CAPSULE, DELAYED RELEASE ORAL at 09:06

## 2024-05-12 RX ADMIN — MORPHINE SULFATE 4 MG: 4 INJECTION INTRAVENOUS at 03:25

## 2024-05-12 ASSESSMENT — FIBROSIS 4 INDEX
FIB4 SCORE: 3.85
FIB4 SCORE: 3.24

## 2024-05-12 ASSESSMENT — ENCOUNTER SYMPTOMS
SHORTNESS OF BREATH: 1
ABDOMINAL PAIN: 1
VOMITING: 0
FEVER: 0
NAUSEA: 0
COUGH: 0
CHILLS: 0
DIARRHEA: 0

## 2024-05-12 ASSESSMENT — COGNITIVE AND FUNCTIONAL STATUS - GENERAL
TOILETING: A LOT
SUGGESTED CMS G CODE MODIFIER MOBILITY: CL
DRESSING REGULAR UPPER BODY CLOTHING: A LOT
TURNING FROM BACK TO SIDE WHILE IN FLAT BAD: A LOT
MOVING TO AND FROM BED TO CHAIR: A LOT
SUGGESTED CMS G CODE MODIFIER DAILY ACTIVITY: CK
MOBILITY SCORE: 12
CLIMB 3 TO 5 STEPS WITH RAILING: A LOT
DAILY ACTIVITIY SCORE: 16
STANDING UP FROM CHAIR USING ARMS: A LOT
HELP NEEDED FOR BATHING: A LOT
MOVING FROM LYING ON BACK TO SITTING ON SIDE OF FLAT BED: A LOT
DRESSING REGULAR LOWER BODY CLOTHING: A LOT
WALKING IN HOSPITAL ROOM: A LOT

## 2024-05-12 ASSESSMENT — PAIN DESCRIPTION - PAIN TYPE
TYPE: ACUTE PAIN
TYPE: CHRONIC PAIN
TYPE: ACUTE PAIN
TYPE: CHRONIC PAIN
TYPE: ACUTE PAIN

## 2024-05-12 ASSESSMENT — LIFESTYLE VARIABLES
DOES PATIENT WANT TO STOP DRINKING: NO
TOTAL SCORE: 0
EVER HAD A DRINK FIRST THING IN THE MORNING TO STEADY YOUR NERVES TO GET RID OF A HANGOVER: NO
ALCOHOL_USE: NO
TOTAL SCORE: 0
CONSUMPTION TOTAL: INCOMPLETE
TOTAL SCORE: 0
EVER FELT BAD OR GUILTY ABOUT YOUR DRINKING: NO
HAVE YOU EVER FELT YOU SHOULD CUT DOWN ON YOUR DRINKING: NO
HAVE PEOPLE ANNOYED YOU BY CRITICIZING YOUR DRINKING: NO

## 2024-05-12 ASSESSMENT — PAIN DESCRIPTION - DESCRIPTORS: DESCRIPTORS: PRESSURE

## 2024-05-12 NOTE — ED NOTES
Patient : Oneida Leyva Age: 65 year old Sex: female   MRN: [id number] Encounter Date: 5/11/2024      History     Chief Complaint   Patient presents with    Fever     HPI  64 yo Female presents emergency department secondary to feeling feverish, generalized weak and having diarrhea for the past week.  Patient did recently return from a ladies trip to [place].  He denies any nausea or vomiting.  She does note that today she has been having lower abdominal cramping and the urge to have a bowel movement without any output.  Patient notes that prior to that she has been having about 5 stools a day.  She denies any hematochezia or dark stools.  She denies any chest pain, shortness of breath or other complaint.  She denies any sick contacts.  She has not recently been hospitalized.  Nor she been on any recent antibiotics.  Patient's past medical history includes hypercholesterolemia, diverticulosis, osteoarthritis, monoclonal gammopathy, chronic kidney disease, ACD, hypertension.    Allergies   Allergen Reactions    Seasonal Other (See Comments)     Nasal congestion       Current Discharge Medication List        Prior to Admission Medications    Details   neomycin-polymyxin B-dexamethasone (MAXITROL) 3.5-67503-6.1 ophthalmic suspension Place 1 drop into left eye in the morning and 1 drop in the evening. X10 days  Qty: 5 mL, Refills: 0      hydroxychloroquine (PLAQUENIL) 200 MG tablet Take 1 tablet by mouth in the morning and 1 tablet in the evening.  Qty: 180 tablet, Refills: 2      amLODIPine (NORVASC) 5 MG tablet Take 1 tablet by mouth daily.  Qty: 90 tablet, Refills: 3      rosuvastatin (CRESTOR) 10 MG tablet Take 1 tablet by mouth daily.  Qty: 90 tablet, Refills: 3      fluticasone (FLONASE) 50 MCG/ACT nasal spray Spray 1 spray in each nostril daily.  Qty: 16 g, Refills: 3      aspirin 81 MG chewable tablet Chew 1 tablet by mouth daily. Do not start before February 20, 2022.  Qty: 100 tablet, Refills: 1      loratadine  Unable to complete. Med rec . Pt is unable to participate in an interview at this time.    Home pharmacy currently closed.   (CLARITIN) 10 MG tablet Take 1 tablet by mouth daily as needed (allergies).            New Prescriptions    Details   metroNIDAZOLE (Flagyl) 500 MG tablet Take 1 tablet by mouth every 12 hours for 10 days.  Qty: 20 tablet, Refills: 0      cefdinir (OMNICEF) 300 MG capsule Take 1 capsule by mouth in the morning and 1 capsule in the evening. Do all this for 10 days.  Qty: 20 capsule, Refills: 0      HYDROcodone-acetaminophen (NORCO) 5-325 MG per tablet Take 1 tablet by mouth every 8 hours as needed for Pain.  Qty: 12 tablet, Refills: 0             Past Medical History:   Diagnosis Date    Acute pericarditis 06/15/2021    Allergic rhinitis due to pollen     Allergic Rhinitis    MELITA positive 05/24/2019    Arthritis     Diverticulosis of colon (without mention of hemorrhage) 12/28/2011    Elevated CPK 08/04/2015    Hyperlipidemia 01/26/2012    Monoclonal gammopathy 09/06/2013    Personal history of colonic polyps 12/28/2011    Unspecified hemorrhoids without mention of complication 12/28/2011       Past Surgical History:   Procedure Laterality Date    BREAST BIOPSY      right    COLONOSCOPY  06/16/2017    dr calhoun, diverticulosis 10 year follow up    COLONOSCOPY W BIOPSY  12/22/2011    adenomatous polyp and diverticulosis - next exam due in 5 years    TONSILLECTOMY AND ADENOIDECTOMY      TOTAL ABDOM HYSTERECTOMY  2006    URMILA w BSO fibroids       Family History   Problem Relation Age of Onset    Asthma Father     Heart Father     Hyperlipidemia Father     Stroke Father     Diabetes Maternal Grandmother     Hypertension Mother     Heart Mother     Hyperlipidemia Mother     Thyroid Brother         hypothyroid    Cancer, Endometrial Daughter     Cancer, Pancreatic Maternal Aunt     Hypertension Sister     Hypertension Sister     Patient is unaware of any medical problems Daughter     Patient is unaware of any medical problems Son        Social History     Tobacco Use    Smoking status: Never    Smokeless tobacco: Never    Vaping Use    Vaping status: never used   Substance Use Topics    Alcohol use: Yes    Drug use: No       E-cigarette/Vaping    E-Cigarette/Vaping Use Never Used      E-Cigarette/Vaping Substances & Devices       Review of Systems  Constitutional:  + chills.  HENT:  Negative for hearing loss.   Eyes:  Negative for visual disturbance.  Respiratory:  Negative for shortness of breath.   Cardiovascular:  Negative for chest pain.  Gastrointestinal:  + for abdominal pain.  Endocrine:  Negative for polydipsia.  Genitourinary:  Negative for dysuria.  Allergic/Immunologic:  Negative for sneezing.  Neurological:  Negative for dizziness.  Hematological:  Does not bruise/bleed easily.  Psychiatric/Behavioral:  Negative for agitation.  All other systems reviewed and are negative.    Physical Exam     ED Triage Vitals   ED Triage Vitals Group      Temp 05/11/24 1946 99.4 °F (37.4 °C)      Heart Rate 05/11/24 1947 94      Resp 05/11/24 1947 18      BP 05/11/24 1947 (!) 180/98      SpO2 05/11/24 1947 97 %      EtCO2 mmHg --       Height 05/11/24 1947 5' 2\" (1.575 m)      Weight 05/11/24 1947 231 lb 7.7 oz (105 kg)      Weight Scale Used 05/11/24 1947 Standing scale      BMI (Calculated) 05/11/24 1947 42.34      IBW/kg (Calculated) 05/11/24 1947 50.1       Physical Exam  Constitutional:  Vital signs reviewed.  Normal development and Body mass index is 42.34 kg/m²., not acutely ill or toxic.   Eyes:  Pupils equal, EOMs normal, conjunctivae normal.  Ears/Nose/Throat/Mouth:  Clear, oral and nasal membranes moist.   Neck:  Trachea midline  Cardiovascular:  Regular rhythm   Respiratory:  Equal breath sounds, no wheezes/rales/rhonchi, no use of accessory muscles, normal respiratory effort.  Abdomen/Gastrointestinal:  Soft, nondistended, nontender  Back:  Normal inspection  Extremities/Musculoskeletal:  No edema, no calf tenderness.  Skin:  Warm, dry, no rash.   Neurologic:  Alert and oriented x 3, cranial nerves grossly intact, moves all  four extremities symmetrically with adequate strength.   Psychiatric:  Normal mood and insight.    ED Course     Procedures    Lab Results     Results for orders placed or performed during the hospital encounter of 05/11/24   Urinalysis & Reflex Microscopy With Culture If Indicated   Result Value Ref Range    COLOR, URINALYSIS Straw     APPEARANCE, URINALYSIS Clear     GLUCOSE, URINALYSIS Negative Negative mg/dL    BILIRUBIN, URINALYSIS Negative Negative    KETONES, URINALYSIS Negative Negative mg/dL    SPECIFIC GRAVITY, URINALYSIS 1.017 1.005 - 1.030    OCCULT BLOOD, URINALYSIS Negative Negative    PH, URINALYSIS 7.0 5.0 - 7.0    PROTEIN, URINALYSIS Trace (A) Negative mg/dL    UROBILINOGEN, URINALYSIS 0.2 0.2, 1.0 mg/dL    NITRITE, URINALYSIS Negative Negative    LEUKOCYTE ESTERASE, URINALYSIS Negative Negative   Comprehensive Metabolic Panel   Result Value Ref Range    Fasting Status      Sodium 141 135 - 145 mmol/L    Potassium 4.0 3.4 - 5.1 mmol/L    Chloride 103 97 - 110 mmol/L    Carbon Dioxide 28 21 - 32 mmol/L    Anion Gap 14 7 - 19 mmol/L    Glucose 107 (H) 70 - 99 mg/dL    BUN 10 6 - 20 mg/dL    Creatinine 0.92 0.51 - 0.95 mg/dL    Glomerular Filtration Rate 69 >=60    BUN/Cr 11 7 - 25    Calcium 8.8 8.4 - 10.2 mg/dL    Bilirubin, Total 0.6 0.2 - 1.0 mg/dL    GOT/AST 22 <=37 Units/L    GPT/ALT 24 <64 Units/L    Alkaline Phosphatase 119 (H) 45 - 117 Units/L    Albumin 3.6 3.6 - 5.1 g/dL    Protein, Total 8.0 6.4 - 8.2 g/dL    Globulin 4.4 (H) 2.0 - 4.0 g/dL    A/G Ratio 0.8 (L) 1.0 - 2.4   Magnesium   Result Value Ref Range    Magnesium 1.8 1.7 - 2.4 mg/dL   CBC with Automated Differential (performable only)   Result Value Ref Range    WBC 10.8 4.2 - 11.0 K/mcL    RBC 4.48 4.00 - 5.20 mil/mcL    HGB 12.7 12.0 - 15.5 g/dL    HCT 38.4 36.0 - 46.5 %    MCV 85.7 78.0 - 100.0 fl    MCH 28.3 26.0 - 34.0 pg    MCHC 33.1 32.0 - 36.5 g/dL    RDW-CV 14.3 11.0 - 15.0 %    RDW-SD 44.8 39.0 - 50.0 fL     140 -  450 K/mcL    NRBC 0 <=0 /100 WBC    Neutrophil, Percent 71 %    Lymphocytes, Percent 17 %    Mono, Percent 9 %    Eosinophils, Percent 2 %    Basophils, Percent 1 %    Immature Granulocytes 0 %    Absolute Neutrophils 7.8 (H) 1.8 - 7.7 K/mcL    Absolute Lymphocytes 1.8 1.0 - 4.0 K/mcL    Absolute Monocytes 0.9 0.3 - 0.9 K/mcL    Absolute Eosinophils  0.2 0.0 - 0.5 K/mcL    Absolute Basophils 0.1 0.0 - 0.3 K/mcL    Absolute Immature Granulocytes 0.0 0.0 - 0.2 K/mcL   Lactic Acid, Venous   Result Value Ref Range    Lactate, Venous 0.8 0.0 - 2.0 mmol/L   SARS-COV-2/INFLUENZA BY PCR   Result Value Ref Range    Rapid SARS-COV-2 by PCR Not Detected Not Detected / Detected / Presumptive Positive / Inhibitors present    Influenza A by PCR Not Detected Not Detected    Influenza B by PCR Not Detected Not Detected    Isolation Guidelines      Procedural Comment           Radiology Results     Imaging Results              CT ABDOMEN PELVIS W CONTRAST (Final result)  Result time 05/11/24 21:19:31      Final result                   Impression:    IMPRESSION: Sigmoid colon diverticulitis with no evidence of complicating  process.    No other positive findings are seen related to abdominal pain.    Follow-up is recommended. Last colonoscopy was in 2017.      Electronically Signed by: Joseph Rao MD  Signed on: 5/11/2024 9:19 PM  Created on Workstation ID: [id number]  Signed on Workstation ID: [id number]               Narrative:    EXAM:  CT ABDOMEN PELVIS W CONTRAST    DATE OF EXAM: 5/11/2024 8:50 PM    65 years-old Female with presenting history of lower abdominal pain.    ADDITIONAL HISTORY:  None.    COMPARISON: February 17, 2022.    TECHNIQUE: Multiple helical scans of the abdomen, and pelvis were obtained,  without oral contrast.   100 ml of Omnipaque 300 intravenous contrast was  used.    CT ABDOMEN:    There are benign-appearing cysts in the liver and in both kidneys.    The liver, biliary tree, pancreas,  and spleen show no  acute findings. The  gallbladder is normal in appearance.     Both kidneys function normally, with no evidence of mass or obstruction.    The bowel and mesentery in the upper abdomen appear within normal limits.  The appendix is normal.    There is no lymphadenopathy or free fluid.  There is no abnormal mass or  inflammatory process in the upper abdomen.    The lung bases are clear. There is no pneumoperitoneum.    CT PELVIS:    There is wall thickening of the sigmoid colon at an area where there are  multiple sigmoid colon diverticuli. The findings are consistent with  diverticulitis. No evidence of bowel obstruction, perforation, or abscess.  Follow-up is recommended with colonoscopy in the near future, at clinical  discretion.    The urinary bladder and distal ureters are normal in appearance.    No masses or abnormal fluid collections are identified.    There is no visible hernia.                                      ED Medication Orders (From admission, onward)      Ordered Start     Status Ordering Provider    05/11/24 2134 05/11/24 2135  ketorolac (TORADOL) injection 30 mg  ONCE         Last MAR action: Given ITALIA REYNA    05/11/24 2130 05/11/24 2131  cefdinir (OMNICEF) capsule 300 mg  ONCE         Last MAR action: Given ITALIA REYNA    05/11/24 2130 05/11/24 2131  metroNIDAZOLE (FLAGYL) tablet 500 mg  ONCE         Last MAR action: Given ITALIA REYNA                 Medical Decision Making  65-year-old female presents emergency department secondary to lower abdominal pain, diarrhea and chills.  Differential diagnosis includes but not limited to diverticulitis, appendicitis, pyelonephritis, enteritis.    Labs reviewed.  Influenza and COVID testing are negative.  Urine is negative for infection.  Lactic is normal.  White blood cell count is normal.  Hemoglobin stable.  CT abdomen pelvis with contrast reviewed.  Patient has findings of sigmoid diverticulitis.  She does not have any evidence  of abscess or perforation.  Final interpretation per radiologist.    Patient is notified of these results.  She notes that she has had a colonoscopy in 2017.  She has been told about diverticulosis in the past.  I have given patient return precautions.  At this time she is comfortable being discharged home on antibiotics.  She will follow up with the PCP in 2-3 days.    Clinical Impression     ED Diagnosis   1. Diverticulitis  HYDROcodone-acetaminophen (NORCO) 5-325 MG per tablet          Disposition        Discharge 5/11/2024  9:25 PM  Oneida RAMOS Wash discharge to home/self care.      ED Course/MDM:    Diagnosis:  The encounter diagnosis was Diverticulitis.    Follow Up:  Hailey Verdugo MD  [address] [facility] [id number]  [phone]    In 2 days            Summary of your Discharge Medications        Take these Medications        Details   cefdinir 300 MG capsule  Commonly known as: OMNICEF   Take 1 capsule by mouth in the morning and 1 capsule in the evening. Do all this for 10 days.     HYDROcodone-acetaminophen 5-325 MG per tablet  Commonly known as: NORCO   Take 1 tablet by mouth every 8 hours as needed for Pain.     metroNIDAZOLE 500 MG tablet  Commonly known as: Flagyl   Take 1 tablet by mouth every 12 hours for 10 days.              Pt is discharged to home/self care in stable condition.     DO Elijah Fox Phawanjit K, DO  05/11/24 [id number]

## 2024-05-12 NOTE — ASSESSMENT & PLAN NOTE
Continue metoprolol.    5/13/2024-restarted Eliquis.  Tolerating well.  Continue daily labs.  No active bleeding

## 2024-05-12 NOTE — RESPIRATORY CARE
COPD EDUCATION by COPD CLINICAL EDUCATOR  5/12/2024 at 3:52 PM by Daniela Morris, RRT     Patient reviewed by COPD education team. Patient does not have a history or diagnosis of COPD and is a non-smoker (quit 2016). Normal PFT in EMR 2/20/2020 confirms. Therefore, patient does not qualify for the COPD program.

## 2024-05-12 NOTE — ED TRIAGE NOTES
April Alicia  64 y.o.  female  Chief Complaint   Patient presents with    Abdominal Swelling     Brought in by sandy from home. C/o abdominal swelling x 1 week. States swelling getting worse, having hard time breathing./ Hx of Liver Cirrhosis.

## 2024-05-12 NOTE — CARE PLAN
The patient is Stable - Low risk of patient condition declining or worsening         Progress made toward(s) clinical / shift goals:  Pt remained free from falls during shift. Pt talking medications as prescribed. She uses a purewick while in bed. She tolerating oral and liquids well. Daughter came to visit pt today.    Admit profile completed. Skin check was not completed. Will endorse to night RN.

## 2024-05-12 NOTE — PROGRESS NOTES
Patient was seen at bedside and chart was reviewed. Please see Dr. Wyatt's note for further details.  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  She has not been using Lasix for a long time because she cannot make it to the bathroom.  In the emergency room WBC 5.8, hemoglobin 8.5, platelets 122, sodium 141, potassium 5.1, creatinine 0.76, ALT 6, AST 18, alk phos 159, INR 1.31  He started on Lasix 40 mg IV twice daily with spironolactone  Troponins are negative.  She is not clear why she has been off anticoagulation.  I will start her on Eliquis.  She has multiple EKGs with confirm A-fib  Continue Lasix and spironolactone.  Last echocardiogram 01/2024.  Continue fluid restriction 1.5 L a day  PT OT pending    Julita Walter M.D.

## 2024-05-12 NOTE — ASSESSMENT & PLAN NOTE
Patient has not been taking furosemide  Ultrasound paracentesis ordered with labs  5/13/24-not enough fluid to perform paracentesis.  Continue furosemide and spironolactone.  Continue daily lab work and replete electrolytes normally.

## 2024-05-12 NOTE — ASSESSMENT & PLAN NOTE
Reduce metoprolol dose spironolactone since she is not taking Lasix at home  5/13/2024-Lasix 40 mg IV twice daily.  Transition hopefully tomorrow on oral.  Continue spironolactone 12.5 mg daily.  Continue to increase to 25.  Metoprolol 12.5 daily.  Currently blood pressure well-controlled.

## 2024-05-12 NOTE — ED PROVIDER NOTES
ER Provider Note    Scribed for Donn Howard Ii, M.d. by Brody Jamison. 5/12/2024  3:02 AM    Primary Care Provider: Anum Gatica M.D.    CHIEF COMPLAINT   Chief Complaint   Patient presents with    Abdominal Swelling     Brought in by sandy from home. C/o abdominal swelling x 1 week. States swelling getting worse, having hard time breathing./ Hx of Liver Cirrhosis.      EXTERNAL RECORDS REVIEWED  Most recent admission was January 11, 2024 for a wound infection    HPI/ROS  LIMITATION TO HISTORY   Select: : None  OUTSIDE HISTORIAN(S):  None    April Alicia is a 64 y.o. female who presents to the ED via EMS for evaluation of abdominal swelling onset 1 week ago. She denies any history of similar symptoms. Her swelling has worsened since symptom onset. She notes difficulty breathing secondary to pressure. She now describes sensations of stretching and hardness. She notes decreased appetite secondary to her abdominal swelling. She later reports black blood that she mentioned to her primary care provider who gave her a referral to GI. She reports a history of alcohol use but quit 5 years ago. She reports history of heart failure.     Reviewed ER note from 5/17/24. She had abdominal distension. CT done which did not show ascites but showed diffuse anasarca.     PAST MEDICAL HISTORY  Past Medical History:   Diagnosis Date    Abnormal finding of lung 12/27/2022    Acute exacerbation of chronic obstructive pulmonary disease (COPD) (HCC) 01/27/2020    Alcoholic cirrhosis (HCC)     Arthritis     OA in knees    ASTHMA     Atrial fibrillation (HCC)     Dry eyes 11/16/2017    Edentulous     upper and lower dentures    Emphysema of lung (HCC)     H/O: hysterectomy 12/05/2019    Heart burn     left knee    Hepatic encephalopathy (HCC) 12/27/2022    Hepatitis C infection 07/28/2022    History of rheumatic fever 09/04/2017    Hypertension     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HCC)     Recurrent, L knee, x4 with  surgical washout    Medication overuse headache 08/15/2017    Mitral regurgitation     Pancytopenia ([facility]) 07/26/2022    Pneumonia 02/2020    Primary osteoarthritis of left knee 08/25/2020    Prosthetic joint infection ([facility]) 2018    Right knee after total knee    Protein-calorie malnutrition, severe ([facility]) 08/01/2022    Right leg DVT ([facility]) 2018    acute, resolved    Seizure disorder ([facility])     Septic arthritis of knee, left ([facility]) 07/19/2022    Septic arthritis of shoulder, left ([facility]) 07/17/2022    Septic shock due to Pseudomonas species ([facility]) 10/30/2023    Type 2 diabetes mellitus ([facility]) 12/27/2022     SURGICAL HISTORY  Past Surgical History:   Procedure Laterality Date    KNEE AMPUTATION ABOVE Left 1/16/2024    Procedure: LEFT ABOVE KNEE AMPUTATION;  Surgeon: Obdulio Gray M.D.;  Location: [facility];  Service: Orthopedics    PB TOTAL KNEE ARTHROPLASTY Left 10/30/2023    Procedure: LEFT TOTAL KNEE ARTHROPLASTY EXPLANTATION, INSERTION OF ANTIBIOTIC SPACER, AND APPLICATION OF INCISIONAL WOUND VACUUM;  Surgeon: Wild Luz M.D.;  Location: [facility];  Service: Orthopedics    PB REVISE KNEE JOINT REPLACE,ALL PARTS Left 12/28/2022    Procedure: REVISION, TOTAL ARTHROPLASTY, KNEE, ALL COMPONENTS-LEFT;  Surgeon: Wild Luz M.D.;  Location: [facility];  Service: Orthopedics    IRRIGATION & DEBRIDEMENT GENERAL Left 12/28/2022    Procedure: IRRIGATION AND DEBRIDEMENT, WOUND;  Surgeon: Wild Luz M.D.;  Location: [facility];  Service: Orthopedics    PB REVISE KNEE JOINT REPLACE,ALL PARTS Left 7/19/2022    Procedure: REVISION, TOTAL ARTHROPLASTY, KNEE, ALL COMPONENTS - ANTIBIOTIC SPACER;  Surgeon: Wild Luz M.D.;  Location: [facility];  Service: Orthopedics    IRRIGATION & DEBRIDEMENT GENERAL Left 7/17/2022    Procedure: IRRIGATION AND DEBRIDEMENT, SHOULDER;  Surgeon: Obdulio Gray M.D.;  Location: [facility];  Service: Orthopedics    PB TOTAL  KNEE ARTHROPLASTY Left 8/24/2020    Procedure: ARTHROPLASTY, KNEE, TOTAL;  Surgeon: Víctor Faustin M.D.;  Location: SURGERY [facility];  Service: Orthopedics    KNEE ARTHROPLASTY TOTAL Right 2018    IRRIGATION & DEBRIDEMENT ORTHO Right 9/3/2017    Procedure: IRRIGATION & DEBRIDEMENT ORTHO, POLY EXCHANGE;  Surgeon: Jerome Russell M.D.;  Location: SURGERY [facility];  Service: Orthopedics    COLONOSCOPY WITH CLIPPING  10/28/2015    Procedure: COLONOSCOPY WITH CLIPPING;  Surgeon: Ruben Colon M.D.;  Location: ENDOSCOPY [facility];  Service:     COLONOSCOPY WITH SCLEROTHERAPY  10/28/2015    Procedure: COLONOSCOPY WITH SCLEROTHERAPY;  Surgeon: Ruben Colon M.D.;  Location: ENDOSCOPY [facility];  Service:     COLONOSCOPY WITH TATTOOING  10/28/2015    Procedure: COLONOSCOPY WITH TATTOOING;  Surgeon: Ruben Colon M.D.;  Location: ENDOSCOPY [facility];  Service:     GYN SURGERY  2003    hysteroscoopy    GYN SURGERY  1982    tubal ligation     FAMILY HISTORY  Family History   Problem Relation Age of Onset    Diabetes Mother     Seizures Father     Heart Attack Father 45    Diabetes Brother     Alcohol abuse Brother     Dementia Brother     Diabetes Brother      SOCIAL HISTORY   reports that she quit smoking about 7 years ago. Her smoking use included cigarettes. She quit smokeless tobacco use about 3 years ago. She reports that she does not currently use alcohol. She reports that she does not currently use drugs.    CURRENT MEDICATIONS  Previous Medications    ATORVASTATIN (LIPITOR) 20 MG TAB    Take 1 Tablet by mouth every evening.    FERROUS SULFATE 325 (65 FE) MG TABLET    Take 325 mg by mouth every day.    FUROSEMIDE (LASIX) 20 MG TAB    Take 1 Tablet by mouth every day.    GABAPENTIN (NEURONTIN) 300 MG CAP    Take 2 Capsules by mouth every 8 hours.    LACTULOSE 20 GM/30ML SOLUTION    Take 15 mL by mouth every day.    LOSARTAN (COZAAR) 100 MG TAB    Take 100 mg by mouth every  "day.    METOPROLOL SR (TOPROL XL) 25 MG TABLET SR 24 HR    Take 1 Tablet by mouth every evening.    ONDANSETRON (ZOFRAN ODT) 4 MG TABLET DISPERSIBLE    Take 1 Tablet by mouth every four hours as needed for Nausea/Vomiting (give PO if no IV route available).    PANTOPRAZOLE (PROTONIX) 20 MG TABLET    Take 20 mg by mouth every day.    POTASSIUM CHLORIDE (MICRO-K) 10 MEQ CAPSULE    Take 1 Capsule by mouth every day.    SPIRIVA HANDIHALER 18 MCG CAP    Place 1 Capsule into inhaler and inhale every day.     ALLERGIES  Patient has no known allergies.    PHYSICAL EXAM  /68   Pulse 79   Temp 36.6 °C (97.9 °F) (Temporal)   Resp 18   Ht 1.727 m (5' 8\")   Wt 86.2 kg (190 lb)   LMP 06/02/2008   SpO2 96%   BMI 28.89 kg/m²   Physical Exam  Vitals and nursing note reviewed.   Constitutional:       Appearance: Normal appearance.   HENT:      Head: Normocephalic.      Mouth/Throat:      Mouth: Mucous membranes are moist.   Eyes:      Extraocular Movements: Extraocular movements intact.      Pupils: Pupils are equal, round, and reactive to light.   Cardiovascular:      Rate and Rhythm: Normal rate. Rhythm irregular.   Abdominal:      Comments: Pitting edema on abdominal wall.  Abdomen feels distended but nontender.  No erythema.   Musculoskeletal:      Comments: Generalized edema.  Left leg BKA.   Skin:     Findings: No rash.   Neurological:      General: No focal deficit present.      Mental Status: She is alert.          DIAGNOSTIC STUDIES    EKG/LABS  Results for orders placed or performed during the hospital encounter of 05/12/24   CBC WITH DIFFERENTIAL   Result Value Ref Range    WBC 5.8 4.8 - 10.8 K/uL    RBC 3.18 (L) 4.20 - 5.40 M/uL    Hemoglobin 8.5 (L) 12.0 - 16.0 g/dL    Hematocrit 27.3 (L) 37.0 - 47.0 %    MCV 85.8 81.4 - 97.8 fL    MCH 26.7 (L) 27.0 - 33.0 pg    MCHC 31.1 (L) 32.2 - 35.5 g/dL    RDW 51.8 (H) 35.9 - 50.0 fL    Platelet Count 122 (L) 164 - 446 K/uL    MPV 10.7 9.0 - 12.9 fL    " Neutrophils-Polys 60.20 44.00 - 72.00 %    Lymphocytes 23.80 22.00 - 41.00 %    Monocytes 8.30 0.00 - 13.40 %    Eosinophils 6.10 0.00 - 6.90 %    Basophils 0.70 0.00 - 1.80 %    Immature Granulocytes 0.90 0.00 - 0.90 %    Nucleated RBC 0.00 0.00 - 0.20 /100 WBC    Neutrophils (Absolute) 3.47 1.82 - 7.42 K/uL    Lymphs (Absolute) 1.37 1.00 - 4.80 K/uL    Monos (Absolute) 0.48 0.00 - 0.85 K/uL    Eos (Absolute) 0.35 0.00 - 0.51 K/uL    Baso (Absolute) 0.04 0.00 - 0.12 K/uL    Immature Granulocytes (abs) 0.05 0.00 - 0.11 K/uL    NRBC (Absolute) 0.00 K/uL   COMP METABOLIC PANEL   Result Value Ref Range    Sodium 141 135 - 145 mmol/L    Potassium 5.1 3.6 - 5.5 mmol/L    Chloride 116 (H) 96 - 112 mmol/L    Co2 16 (L) 20 - 33 mmol/L    Anion Gap 9.0 7.0 - 16.0    Glucose 112 (H) 65 - 99 mg/dL    Bun 17 8 - 22 mg/dL    Creatinine 0.76 0.50 - 1.40 mg/dL    Calcium 8.5 8.5 - 10.5 mg/dL    Correct Calcium 9.5 8.5 - 10.5 mg/dL    AST(SGOT) 18 12 - 45 U/L    ALT(SGPT) 6 2 - 50 U/L    Alkaline Phosphatase 159 (H) 30 - 99 U/L    Total Bilirubin 0.6 0.1 - 1.5 mg/dL    Albumin 2.8 (L) 3.2 - 4.9 g/dL    Total Protein 6.6 6.0 - 8.2 g/dL    Globulin 3.8 (H) 1.9 - 3.5 g/dL    A-G Ratio 0.7 g/dL   PT/INR   Result Value Ref Range    PT 16.4 (H) 12.0 - 14.6 sec    INR 1.31 (H) 0.87 - 1.13   PTT   Result Value Ref Range    APTT 34.3 24.7 - 36.0 sec   ESTIMATED GFR   Result Value Ref Range    GFR (CKD-EPI) 87 >60 mL/min/1.73 m 2   proBrain Natriuretic Peptide, NT   Result Value Ref Range    NT-proBNP 1590 (H) 0 - 125 pg/mL   TROPONIN   Result Value Ref Range    Troponin T 19 6 - 19 ng/L   EKG   Result Value Ref Range    Report       [facility] Emergency Dept.    Test Date:  2024  Pt Name:    FRANCISCA TURNER          Department: ER  MRN:        [id number]                      Room:        20  Gender:     Female                       Technician: [id number]  :        1960                   Requested By:DA  IMAN TOVAR II  Order #:    [id number]                    Reading MD: Donn Tovar II, MD    Measurements  Intervals                                Axis  Rate:       88                           P:          0  MD:         0                            QRS:        -1  QRSD:       82                           T:          86  QT:         439  QTc:        532    Interpretive Statements  Atrial fibrillation  Low voltage, extremity leads  Prolonged QT interval  Impression: Atrial fibrillation, prolonged QT interval.  Compared to ECG 01/07/2024 12:45:52  Low QRS voltage now present  Prolonged QT interval now present  Electronically Signed On 05- 05:29:32 PDT by Donn rai II, MD        I have independently interpreted this EKG    RADIOLOGY/PROCEDURES   The attending emergency physician has independently interpreted the diagnostic imaging associated with this visit and am waiting the final reading from the radiologist.   My preliminary interpretation is a follows: Cardiomegaly without pulmonary edema    Radiologist interpretation:  DX-CHEST-PORTABLE (1 VIEW)   Final Result         1. Cardiomegaly again noted.   2. No acute process.      US-PARACENTESIS, ABD WITH IMAGING    (Results Pending)     Point of Care Ultrasound    ED POINT OF CARE ULTRASOUND: Limited abdominal    Limited Diagnostic Exam: Limited Abdomen        LLQ Free Fluid:present,     Impression: Ascites with anasarca at abdominal wall    This study is a limited ultrasound examination performed and interpreted to evaluate for limited conditions as outlined above. There may be other clinically important information contained in the images that is outside this scope. When clinically warranted, a comprehensive ultrasound through the appropriate department is considered.      This study is a limited ultrasound examination performed and interpreted to evaluate for limited conditions as outlined above. There may be other clinically important  information contained in the images that is outside this scope. When clinically warranted, a comprehensive ultrasound through the appropriate department is considered.     COURSE & MEDICAL DECISION MAKING     ASSESSMENT, COURSE AND PLAN  Care Narrative:   3:07 AM - Patient seen and examined at bedside. This is an evaluation of a 64 y.o. woman who presents to the ED for evaluation of 1 week of worsening abdominal swelling and shortness of breath.  Shortness of breath worse with laying flat.  Ordered ProBNP, CBC with diff, CMP, PT/INR, and  PTT to evaluate.  Concerns for progressive liver failure now with ascites, CHF exacerbation.    3:41 AM - Patient was reevaluated at bedside. Bedside ultrasound performed which shows free fluid in abdomen likely consistent with ascites.  It is a small band and there is significant diffuse soft tissue edema at the abdomen.  This seems to correlate with prior CT scan.  Ordered Dx-chest.  Considering doing paracentesis but would like to see coags.  It is unclear what anticoagulant she is taking if any.  I see apixaban and edoxaban as prior medication she has taken in the past year.    4:44 AM - Reviewed labs at this time. ProBNP of 1590. Ordered Lasix. Will order EKG and Troponin.     5:15 AM - Patient was reevaluated at bedside.  Was able to obtain additional history.  She says she does have a history of heart failure.  She has lasix at home but does not take it because it is difficult to get to the restroom.     5:31 AM - Paged Hospitalist.    5:40 AM - I discussed the patient's case and the above findings with Dr. Wyatt (Hospitalist) who agrees to evaluate the patient for hospitalization for further treatment for fluid overload/anasarca.  Although she is not hypoxic she does have discomfort and dyspnea.  Diuresis started with Lasix..  She has already urinated over 500 cc.  Will defer paracentesis at this time. Unclear compliance with anticoagulants, unknown time of last dose.  Also  given the visualized fluid in the abdomen, does not look as though an emergent paracentesis would rapidly resolve her symptoms.    PROBLEM LIST  # Anasarca   -Likely due to cirrhosis and noncompliance with home diuretics   -Previous echo did not show obvious heart failure    # Paroxysmal atrial fibrillation   -Currently with rate controlled atrial fibrillation   -Anticoagulation status unknown.  INR 1.31, PT 16.4 APTT 34.3    # Anemia   -Chronic and minimally decreased from 1 month ago    # Thrombocytopenia   -Platelet count 122    DISPOSITION AND DISCUSSIONS  I have discussed management of the patient with the following physicians and CANDIDO's:  Dr. Wyatt (Hospitalist)    Discussion of management with other [facility] or appropriate source(s): None     Escalation of care considered, and ultimately not performed: Considered paracentesis, but she is on anticoagulants. Ascites seen on ultrasound is a small band of fluid.     Barriers to care at this time, including but not limited to:  None known at this time .       DISPOSITION:  Patient will be hospitalized by Dr. Wyatt in guarded condition.     FINAL DIAGNOSIS  1. Ascites due to alcoholic cirrhosis (HCC)    2. Anasarca    3. Thrombocytopenia (HCC)       Brody ARGUETA (Lisandro), am scribing for, and in the presence of, YESI Hudson II.    Electronically signed by: Brody Jamison (Lisandro), 5/12/2024    Donn ARGUETA II, M* personally performed the services described in this documentation, as scribed by Brody Jamison in my presence, and it is both accurate and complete.      The note accurately reflects work and decisions made by me.  Donn Howard II, M.D.  5/12/2024  9:52 AM

## 2024-05-12 NOTE — H&P
Hospital Medicine History & Physical Note    Date of Service  5/12/2024    Primary Care Physician  Anum Gatica M.D.    Code Status  Full Code    Chief Complaint  Chief Complaint   Patient presents with    Abdominal Swelling     Brought in by remsa from home. C/o abdominal swelling x 1 week. States swelling getting worse, having hard time breathing./ Hx of Liver Cirrhosis.        History of Presenting Illness  April Alicia is a 64 y.o. female who presented 5/12/2024 with abdominal swelling and pain.  PMH of alcoholic cirrhosis, atrial fibrillation not on anticoagulation, hypertension, dyslipidemia, GERD, s/p left AKA.  Patient comes in with worsening abdominal swelling and pain for the past few weeks.  He said now she is getting short of breath so she decided to come to the ED.  She has not been taking her furosemide for several weeks now because she says she is alone at home around the time and she is afraid she will make it to the bathroom in time.  She quit alcohol long ago.    In the ED afebrile, hemodynamically stable.  Labs show hemoglobin of 8.5, platelet 122.    I discussed the plan of care with patient, bedside RN, and edp .    Review of Systems  Review of Systems   Constitutional:  Negative for chills and fever.   Respiratory:  Positive for shortness of breath. Negative for cough.    Cardiovascular:  Positive for leg swelling. Negative for chest pain.   Gastrointestinal:  Positive for abdominal pain. Negative for diarrhea, nausea and vomiting.   Genitourinary:  Negative for dysuria and urgency.       Past Medical History   has a past medical history of Abnormal finding of lung (12/27/2022), Acute exacerbation of chronic obstructive pulmonary disease (COPD) (HCC) (01/27/2020), Alcoholic cirrhosis (HCC), Arthritis, ASTHMA, Atrial fibrillation (HCC), Dry eyes (11/16/2017), Edentulous, Emphysema of lung (HCC), H/O: hysterectomy (12/05/2019), Heart burn, Hepatic encephalopathy (HCC) (12/27/2022),  Hepatitis C infection (07/28/2022), History of rheumatic fever (09/04/2017), Hypertension, 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[facility]), Medication overuse headache (08/15/2017), Mitral regurgitation, Pancytopenia (HCC) (07/26/2022), Pneumonia (02/2020), Primary osteoarthritis of left knee (08/25/2020), Prosthetic joint infection ([facility]) (2018), Protein-calorie malnutrition, severe ([facility]) (08/01/2022), Right leg DVT ([facility]) (2018), Seizure disorder ([facility]), Septic arthritis of knee, left (HCC) (07/19/2022), Septic arthritis of shoulder, left ([facility]) (07/17/2022), Septic shock due to Pseudomonas species ([facility]) (10/30/2023), and Type 2 diabetes mellitus ([facility]) (12/27/2022).    Surgical History   has a past surgical history that includes gyn surgery (1982); gyn surgery (2003); colonoscopy with clipping (10/28/2015); colonoscopy with sclerotherapy (10/28/2015); colonoscopy with tattooing (10/28/2015); irrigation & debridement ortho (Right, 9/3/2017); knee arthroplasty total (Right, 2018); pr total knee arthroplasty (Left, 8/24/2020); irrigation & debridement general (Left, 7/17/2022); pr revise knee joint replace,all parts (Left, 7/19/2022); pr revise knee joint replace,all parts (Left, 12/28/2022); irrigation & debridement general (Left, 12/28/2022); pr total knee arthroplasty (Left, 10/30/2023); and knee amputation above (Left, 1/16/2024).     Family History  family history includes Alcohol abuse in her brother; Dementia in her brother; Diabetes in her brother, brother, and mother; Heart Attack (age of onset: 45) in her father; Seizures in her father.   Family history reviewed with patient. There is no family history that is pertinent to the chief complaint.     Social History   reports that she quit smoking about 7 years ago. Her smoking use included cigarettes. She quit smokeless tobacco use about 3 years ago. She reports that she does not currently use alcohol. She reports that she does not currently use drugs.    Allergies  No Known  Allergies    Medications  Prior to Admission Medications   Prescriptions Last Dose Informant Patient Reported? Taking?   SPIRIVA HANDIHALER 18 MCG Cap  MAR from Other Facility Yes No   Sig: Place 1 Capsule into inhaler and inhale every day.   atorvastatin (LIPITOR) 20 MG Tab  MAR from Other Facility No No   Sig: Take 1 Tablet by mouth every evening.   ferrous sulfate 325 (65 Fe) MG tablet  MAR from Other Facility Yes No   Sig: Take 325 mg by mouth every day.   furosemide (LASIX) 20 MG Tab  MAR from Other Facility No No   Sig: Take 1 Tablet by mouth every day.   gabapentin (NEURONTIN) 300 MG Cap  MAR from Other Facility No No   Sig: Take 2 Capsules by mouth every 8 hours.   lactulose 20 GM/30ML Solution  MAR from Other Facility No No   Sig: Take 15 mL by mouth every day.   losartan (COZAAR) 100 MG Tab  MAR from Other Facility Yes No   Sig: Take 100 mg by mouth every day.   metoprolol SR (TOPROL XL) 25 MG TABLET SR 24 HR  MAR from Other Facility No No   Sig: Take 1 Tablet by mouth every evening.   ondansetron (ZOFRAN ODT) 4 MG TABLET DISPERSIBLE   No No   Sig: Take 1 Tablet by mouth every four hours as needed for Nausea/Vomiting (give PO if no IV route available).   pantoprazole (PROTONIX) 20 MG tablet  MAR from Other Facility Yes No   Sig: Take 20 mg by mouth every day.   potassium chloride (MICRO-K) 10 MEQ capsule  MAR from Other Facility No No   Sig: Take 1 Capsule by mouth every day.      Facility-Administered Medications: None       Physical Exam  Temp:  [36.6 °C (97.9 °F)] 36.6 °C (97.9 °F)  Pulse:  [73-86] 86  Resp:  [17-19] 19  BP: (113-132)/(68-83) 132/71  SpO2:  [96 %-97 %] 96 %  Blood Pressure: 132/73   Temperature: 36.6 °C (97.9 °F)   Pulse: 73   Respiration: 18   Pulse Oximetry: 97 %       Physical Exam  Constitutional:       Appearance: Normal appearance.   HENT:      Head: Normocephalic and atraumatic.      Mouth/Throat:      Mouth: Mucous membranes are moist.      Pharynx: No oropharyngeal exudate or  posterior oropharyngeal erythema.   Cardiovascular:      Rate and Rhythm: Normal rate and regular rhythm.      Pulses: Normal pulses.      Heart sounds: Murmur heard.   Pulmonary:      Effort: Pulmonary effort is normal. No respiratory distress.      Breath sounds: Normal breath sounds.   Musculoskeletal:         General: No swelling or tenderness. Normal range of motion.      Comments: S/p left AKA   Skin:     General: Skin is warm and dry.   Neurological:      General: No focal deficit present.      Mental Status: She is alert and oriented to person, place, and time.   Psychiatric:         Mood and Affect: Mood normal.         Laboratory:  Recent Labs     05/12/24 0315   WBC 5.8   RBC 3.18*   HEMOGLOBIN 8.5*   HEMATOCRIT 27.3*   MCV 85.8   MCH 26.7*   MCHC 31.1*   RDW 51.8*   PLATELETCT 122*   MPV 10.7     Recent Labs     05/12/24 0315   SODIUM 141   POTASSIUM 5.1   CHLORIDE 116*   CO2 16*   GLUCOSE 112*   BUN 17   CREATININE 0.76   CALCIUM 8.5     Recent Labs     05/12/24 0315   ALTSGPT 6   ASTSGOT 18   ALKPHOSPHAT 159*   TBILIRUBIN 0.6   GLUCOSE 112*     Recent Labs     05/12/24 0315   APTT 34.3   INR 1.31*     Recent Labs     05/12/24 0315   NTPROBNP 1590*         Recent Labs     05/12/24 0315   TROPONINT 19       Imaging:  DX-CHEST-PORTABLE (1 VIEW)   Final Result         1. Cardiomegaly again noted.   2. No acute process.      US-PARACENTESIS, ABD WITH IMAGING    (Results Pending)       X-Ray:  I have personally reviewed the images and compared with prior images. and My impression is: Interstitial edema  EKG:  I have personally reviewed the images and compared with prior images. and My impression is: A-fib    Assessment/Plan:  Justification for Admission Status  I anticipate this patient will require at least two midnights for appropriate medical management, necessitating inpatient admission because anasarca    * Anasarca- (present on admission)  Assessment & Plan  Anasarca due to decompensated  cirrhosis and noncompliance with furosemide  She does not take it because she is afraid she will make it to the bathroom in time due to her amputation and the fact that she is alone at home usually  IV diuretics, BMP ordered to continue monitor kidney function  Started on spironolactone    Paroxysmal atrial fibrillation- (present on admission)  Assessment & Plan  Continue metoprolol.  Not on anticoagulation    Primary hypertension- (present on admission)  Assessment & Plan  Reduce metoprolol dose spironolactone since she is not taking Lasix at home    Thrombocytopenia (HCC)- (present on admission)  Assessment & Plan  121 presentation, secondary to cirrhosis.  CBC ordered continue monitoring    Alcoholic cirrhosis of liver with ascites (HCC)- (present on admission)  Assessment & Plan  Patient has not been taking furosemide  Ultrasound paracentesis ordered with labs        VTE prophylaxis: enoxaparin ppx

## 2024-05-12 NOTE — ASSESSMENT & PLAN NOTE
Anasarca due to decompensated cirrhosis and noncompliance with furosemide  She does not take it because she is afraid she will make it to the bathroom in time due to her amputation and the fact that she is alone at home usually  IV diuretics, BMP ordered to continue monitor kidney function  Started on spironolactone  5/13/2024-Improving.  Continue same treatment.  Echocardiogram is pending

## 2024-05-13 ENCOUNTER — APPOINTMENT (OUTPATIENT)
Dept: RADIOLOGY | Facility: MEDICAL CENTER | Age: 64
DRG: 434 | End: 2024-05-13
Attending: STUDENT IN AN ORGANIZED HEALTH CARE EDUCATION/TRAINING PROGRAM
Payer: MEDICAID

## 2024-05-13 LAB
ALBUMIN SERPL BCP-MCNC: 2.3 G/DL (ref 3.2–4.9)
ALBUMIN/GLOB SERPL: 0.6 G/DL
ALP SERPL-CCNC: 120 U/L (ref 30–99)
ALT SERPL-CCNC: 13 U/L (ref 2–50)
ANION GAP SERPL CALC-SCNC: 8 MMOL/L (ref 7–16)
AST SERPL-CCNC: 17 U/L (ref 12–45)
BILIRUB SERPL-MCNC: 0.6 MG/DL (ref 0.1–1.5)
BUN SERPL-MCNC: 13 MG/DL (ref 8–22)
CALCIUM ALBUM COR SERPL-MCNC: 9.5 MG/DL (ref 8.5–10.5)
CALCIUM SERPL-MCNC: 8.1 MG/DL (ref 8.5–10.5)
CHLORIDE SERPL-SCNC: 112 MMOL/L (ref 96–112)
CO2 SERPL-SCNC: 20 MMOL/L (ref 20–33)
CREAT SERPL-MCNC: 0.67 MG/DL (ref 0.5–1.4)
ERYTHROCYTE [DISTWIDTH] IN BLOOD BY AUTOMATED COUNT: 49.7 FL (ref 35.9–50)
GFR SERPLBLD CREATININE-BSD FMLA CKD-EPI: 97 ML/MIN/1.73 M 2
GLOBULIN SER CALC-MCNC: 3.9 G/DL (ref 1.9–3.5)
GLUCOSE SERPL-MCNC: 92 MG/DL (ref 65–99)
HCT VFR BLD AUTO: 26.7 % (ref 37–47)
HGB BLD-MCNC: 8.7 G/DL (ref 12–16)
MCH RBC QN AUTO: 26.9 PG (ref 27–33)
MCHC RBC AUTO-ENTMCNC: 32.6 G/DL (ref 32.2–35.5)
MCV RBC AUTO: 82.4 FL (ref 81.4–97.8)
PLATELET # BLD AUTO: 115 K/UL (ref 164–446)
PMV BLD AUTO: 9.4 FL (ref 9–12.9)
POTASSIUM SERPL-SCNC: 4.8 MMOL/L (ref 3.6–5.5)
PROT SERPL-MCNC: 6.2 G/DL (ref 6–8.2)
RBC # BLD AUTO: 3.24 M/UL (ref 4.2–5.4)
SODIUM SERPL-SCNC: 140 MMOL/L (ref 135–145)
WBC # BLD AUTO: 7.5 K/UL (ref 4.8–10.8)

## 2024-05-13 PROCEDURE — 99233 SBSQ HOSP IP/OBS HIGH 50: CPT | Performed by: INTERNAL MEDICINE

## 2024-05-13 RX ORDER — FERROUS GLUCONATE 324(38)MG
324 TABLET ORAL
Status: DISCONTINUED | OUTPATIENT
Start: 2024-05-14 | End: 2024-05-14 | Stop reason: HOSPADM

## 2024-05-13 RX ADMIN — SPIRONOLACTONE 12.5 MG: 25 TABLET ORAL at 05:46

## 2024-05-13 RX ADMIN — LOSARTAN POTASSIUM 100 MG: 50 TABLET, FILM COATED ORAL at 05:46

## 2024-05-13 RX ADMIN — ATORVASTATIN CALCIUM 20 MG: 20 TABLET, FILM COATED ORAL at 20:23

## 2024-05-13 RX ADMIN — FUROSEMIDE 40 MG: 10 INJECTION, SOLUTION INTRAMUSCULAR; INTRAVENOUS at 05:46

## 2024-05-13 RX ADMIN — METOPROLOL SUCCINATE 12.5 MG: 25 TABLET, EXTENDED RELEASE ORAL at 20:23

## 2024-05-13 RX ADMIN — FUROSEMIDE 40 MG: 10 INJECTION, SOLUTION INTRAMUSCULAR; INTRAVENOUS at 16:25

## 2024-05-13 RX ADMIN — OXYCODONE HYDROCHLORIDE 10 MG: 10 TABLET ORAL at 16:24

## 2024-05-13 RX ADMIN — OMEPRAZOLE 20 MG: 20 CAPSULE, DELAYED RELEASE ORAL at 05:46

## 2024-05-13 RX ADMIN — OXYCODONE HYDROCHLORIDE 10 MG: 10 TABLET ORAL at 09:30

## 2024-05-13 RX ADMIN — GABAPENTIN 600 MG: 300 CAPSULE ORAL at 14:22

## 2024-05-13 RX ADMIN — GABAPENTIN 600 MG: 300 CAPSULE ORAL at 05:46

## 2024-05-13 RX ADMIN — GABAPENTIN 600 MG: 300 CAPSULE ORAL at 20:23

## 2024-05-13 RX ADMIN — APIXABAN 5 MG: 5 TABLET, FILM COATED ORAL at 16:24

## 2024-05-13 ASSESSMENT — ENCOUNTER SYMPTOMS
HEADACHES: 0
BLOOD IN STOOL: 0
SPUTUM PRODUCTION: 0
COUGH: 0
DIZZINESS: 0
SORE THROAT: 0
CONSTIPATION: 0
PALPITATIONS: 0
CHILLS: 0
NECK PAIN: 0
WHEEZING: 0
VOMITING: 0
DIARRHEA: 0
NAUSEA: 0
HEARTBURN: 0
ABDOMINAL PAIN: 0
NERVOUS/ANXIOUS: 1
FEVER: 0
SHORTNESS OF BREATH: 1

## 2024-05-13 ASSESSMENT — CHA2DS2 SCORE
AGE 75 OR GREATER: NO
PRIOR STROKE OR TIA OR THROMBOEMBOLISM: NO
HYPERTENSION: YES
CHF OR LEFT VENTRICULAR DYSFUNCTION: NO
VASCULAR DISEASE: NO
AGE 65 TO 74: NO
CHA2DS2 VASC SCORE: 3
SEX: FEMALE
DIABETES: YES

## 2024-05-13 ASSESSMENT — PAIN DESCRIPTION - PAIN TYPE
TYPE: ACUTE PAIN;CHRONIC PAIN
TYPE: CHRONIC PAIN
TYPE: CHRONIC PAIN
TYPE: ACUTE PAIN

## 2024-05-13 NOTE — HOSPITAL COURSE
63 y/o female past medical history of alcohol cirrhosis, type 2 diabetes A-fib not clear why she is not on anticoagulation, previous septic joint of the left shoulder and left knee prosthetic s/p left AKA, remote history of cocaine use presented to emergency room on 5/12/2022 worsening abdominal swelling, shortness of breath.  She has not been using Lasix for a long time because she cannot make it to the bathroom.  In the emergency room WBC 5.8, hemoglobin 8.5, platelets 122, sodium 141, potassium 5.1, creatinine 0.76, ALT 6, AST 18, alk phos 159, INR 1.31  He started on Lasix 40 mg IV twice daily with spironolactone  Troponins are negative.  She is not clear why she has been off anticoagulation.  I will start her on Eliquis.  She has multiple EKGs with confirm A-fib  Continue Lasix and spironolactone.  Last echocardiogram 01/2024.  Continue fluid restriction 1.5 L a day  PT OT pending

## 2024-05-13 NOTE — CARE PLAN
The patient is Stable - Low risk of patient condition declining or worsening    Shift Goals  Clinical Goals: paracentesis today  Patient Goals: get procedure done/go home  Family Goals: n/a none present    Progress made toward(s) clinical / shift goals:        Problem: Pain - Standard  Goal: Alleviation of pain or a reduction in pain to the patient’s comfort goal  Description: Target End Date:  Prior to discharge or change in level of care    Document on Vitals flowsheet    1.  Document pain using the appropriate pain scale per order or unit policy  2.  Educate and implement non-pharmacologic comfort measures (i.e. relaxation, distraction, massage, cold/heat therapy, etc.)  3.  Pain management medications as ordered  4.  Reassess pain after pain med administration per policy  5.  If opiods administered assess patient's response to pain medication is appropriate per POSS sedation scale  6.  Follow pain management plan developed in collaboration with patient and interdisciplinary team (including palliative care or pain specialists if applicable)  Outcome: Progressing     Problem: Skin Integrity  Goal: Skin integrity is maintained or improved  Description: Target End Date:  Prior to discharge or change in level of care    Document interventions on Skin Risk/Sincere flowsheet groups and corresponding LDA    1.  Assess and monitor skin integrity, appearance and/or temperature  2.  Assess risk factors for impaired skin integrity and/or pressures ulcers  3.  Implement precautions to protect skin integrity in collaboration with interdisciplinary team  4.  Implement pressure ulcer prevention protocol if at risk for skin breakdown  5.  Confirm wound care consult if at risk for skin breakdown  6.  Ensure patient use of pressure relieving devices  (Low air loss bed, waffle overlay, heel protectors, ROHO cushion, etc)  Outcome: Progressing      Patient alert, possible paracentesis today. Per patient lives at home with her  daughter. Medicated with oxycodone x1 .

## 2024-05-13 NOTE — ASSESSMENT & PLAN NOTE
She have had iron deficiency anemia in the past.  She also have cirrhosis.  Continue empiric PPIs and iron pills.  To follow-up with GI as outpatient  Continue Eliquis, stop into any signs of bleeding

## 2024-05-13 NOTE — CARE PLAN
The patient is Stable - Low risk of patient condition declining or worsening         Progress made toward(s) clinical / shift goals:    Problem: Pain - Standard  Goal: Alleviation of pain or a reduction in pain to the patient’s comfort goal  Description: Target End Date:  Prior to discharge or change in level of care    Document on Vitals flowsheet    1.  Document pain using the appropriate pain scale per order or unit policy  2.  Educate and implement non-pharmacologic comfort measures (i.e. relaxation, distraction, massage, cold/heat therapy, etc.)  3.  Pain management medications as ordered  4.  Reassess pain after pain med administration per policy  5.  If opiods administered assess patient's response to pain medication is appropriate per POSS sedation scale  6.  Follow pain management plan developed in collaboration with patient and interdisciplinary team (including palliative care or pain specialists if applicable)  Outcome: Progressing     Problem: Knowledge Deficit - Standard  Goal: Patient and family/care givers will demonstrate understanding of plan of care, disease process/condition, diagnostic tests and medications  Description: Target End Date:  1-3 days or as soon as patient condition allows    Document in Patient Education    1.  Patient and family/caregiver oriented to unit, equipment, visitation policy and means for communicating concern  2.  Complete/review Learning Assessment  3.  Assess knowledge level of disease process/condition, treatment plan, diagnostic tests and medications  4.  Explain disease process/condition, treatment plan, diagnostic tests and medications  Outcome: Progressing       Patient is not progressing towards the following goals:

## 2024-05-13 NOTE — PROGRESS NOTES
Hospital Medicine Daily Progress Note    Date of Service  5/13/2024    Chief Complaint  April Alicia is a 64 y.o. female admitted 5/12/2024 with worsening abdominal  swelling, shortness of breath    Hospital Course  63 y/o female past medical history of alcohol cirrhosis, type 2 diabetes A-fib not clear why she is not on anticoagulation, previous septic joint of the left shoulder and left knee prosthetic s/p left AKA, remote history of cocaine use presented to emergency room on 5/12/2022 worsening abdominal swelling, shortness of breath.  She has not been using Lasix for a long time because she cannot make it to the bathroom.  In the emergency room WBC 5.8, hemoglobin 8.5, platelets 122, sodium 141, potassium 5.1, creatinine 0.76, ALT 6, AST 18, alk phos 159, INR 1.31  He started on Lasix 40 mg IV twice daily with spironolactone  Troponins are negative.  She is not clear why she has been off anticoagulation.  I will start her on Eliquis.  She has multiple EKGs with confirm A-fib  Continue Lasix and spironolactone.  Last echocardiogram 01/2024.  Continue fluid restriction 1.5 L a day  PT OT pending    Interval Problem Update  She has significant improvement on swallowing.  She reports abdomen is significantly less swollen.  Heart rate 93, blood pressure 123/78 respiratory 95% on room air  Electrolytes, GFR within normal.  Continue Lasix 40 mg twice daily.  Continue spironolactone  proBNP 1590, troponin normal  Echocardiogram is pending  Ultrasound was completed, she has minimal fluid for paracentesis therefore this procedure is postponed      I have discussed this patient's plan of care and discharge plan at IDT rounds today with Case Management, Nursing, Nursing leadership, and other members of the IDT team.    Consultants/Specialty  none    Code Status  Full Code    Disposition  Medically Cleared  I have placed the appropriate orders for post-discharge needs.    Review of Systems  Review of Systems    Constitutional:  Positive for malaise/fatigue. Negative for chills and fever.   HENT:  Negative for sore throat.    Respiratory:  Positive for shortness of breath. Negative for cough, sputum production and wheezing.    Cardiovascular:  Positive for leg swelling. Negative for chest pain and palpitations.   Gastrointestinal:  Negative for abdominal pain, blood in stool, constipation, diarrhea, heartburn, nausea and vomiting.   Genitourinary:  Negative for dysuria and urgency.   Musculoskeletal:  Negative for neck pain.   Neurological:  Negative for dizziness and headaches.   Psychiatric/Behavioral:  The patient is nervous/anxious.         Physical Exam  Temp:  [36.4 °C (97.6 °F)-37.2 °C (98.9 °F)] 36.9 °C (98.4 °F)  Pulse:  [73-99] 93  Resp:  [16-19] 16  BP: (120-157)/(67-86) 123/78  SpO2:  [95 %-99 %] 95 %    Physical Exam  Vitals and nursing note reviewed.   Constitutional:       General: She is not in acute distress.     Appearance: She is ill-appearing.   HENT:      Head: Normocephalic and atraumatic.   Eyes:      General: No scleral icterus.  Cardiovascular:      Rate and Rhythm: Normal rate.      Heart sounds: No murmur heard.  Pulmonary:      Effort: Pulmonary effort is normal. No respiratory distress.      Breath sounds: No wheezing or rales.   Abdominal:      General: There is distension.      Palpations: Abdomen is soft.      Tenderness: There is no abdominal tenderness. There is no guarding.   Musculoskeletal:      Right lower leg: Edema present.      Left lower leg: Edema present.   Skin:     Coloration: Skin is pale.   Neurological:      Mental Status: She is alert and oriented to person, place, and time.      Cranial Nerves: No cranial nerve deficit.   Psychiatric:         Mood and Affect: Mood normal.         Behavior: Behavior normal.         Thought Content: Thought content normal.         Judgment: Judgment normal.         Fluids    Intake/Output Summary (Last 24 hours) at 5/13/2024 1602  Last data  filed at 5/13/2024 0546  Gross per 24 hour   Intake 250 ml   Output --   Net 250 ml       Laboratory  Recent Labs     05/12/24 0315 05/13/24 0412   WBC 5.8 7.5   RBC 3.18* 3.24*   HEMOGLOBIN 8.5* 8.7*   HEMATOCRIT 27.3* 26.7*   MCV 85.8 82.4   MCH 26.7* 26.9*   MCHC 31.1* 32.6   RDW 51.8* 49.7   PLATELETCT 122* 115*   MPV 10.7 9.4     Recent Labs     05/12/24 0315 05/13/24 0412   SODIUM 141 140   POTASSIUM 5.1 4.8   CHLORIDE 116* 112   CO2 16* 20   GLUCOSE 112* 92   BUN 17 13   CREATININE 0.76 0.67   CALCIUM 8.5 8.1*     Recent Labs     05/12/24 0315   APTT 34.3   INR 1.31*               Imaging  DX-CHEST-PORTABLE (1 VIEW)   Final Result         1. Cardiomegaly again noted.   2. No acute process.      US-PARACENTESIS, ABD WITH IMAGING    (Results Pending)        Assessment/Plan  * Anasarca- (present on admission)  Assessment & Plan  Anasarca due to decompensated cirrhosis and noncompliance with furosemide  She does not take it because she is afraid she will make it to the bathroom in time due to her amputation and the fact that she is alone at home usually  IV diuretics, BMP ordered to continue monitor kidney function  Started on spironolactone  5/13/2024-Improving.  Continue same treatment.  Echocardiogram is pending    Anemia- (present on admission)  Assessment & Plan  She have had iron deficiency anemia in the past.  She also have cirrhosis.  Continue empiric PPIs and iron pills.  To follow-up with GI as outpatient  Continue Eliquis, stop into any signs of bleeding    Paroxysmal atrial fibrillation- (present on admission)  Assessment & Plan  Continue metoprolol.    5/13/2024-restarted Eliquis.  Tolerating well.  Continue daily labs.  No active bleeding    Primary hypertension- (present on admission)  Assessment & Plan  Reduce metoprolol dose spironolactone since she is not taking Lasix at home  5/13/2024-Lasix 40 mg IV twice daily.  Transition hopefully tomorrow on oral.  Continue spironolactone 12.5 mg  daily.  Continue to increase to 25.  Metoprolol 12.5 daily.  Currently blood pressure well-controlled.    Thrombocytopenia (HCC)- (present on admission)  Assessment & Plan  121 presentation, secondary to cirrhosis.  CBC ordered continue monitoring    Alcoholic cirrhosis of liver with ascites (HCC)- (present on admission)  Assessment & Plan  Patient has not been taking furosemide  Ultrasound paracentesis ordered with labs  5/13/24-not enough fluid to perform paracentesis.  Continue furosemide and spironolactone.  Continue daily lab work and replete electrolytes normally.         VTE prophylaxis:   SCDs/TEDs   therapeutic anticoagulation with eliquis 5 mg BID      I have performed a physical exam and reviewed and updated ROS and Plan today (5/13/2024). In review of yesterday's note (5/12/2024), there are no changes except as documented above.      Greater than 51 minutes spent prepping to see patient (e.g. review of tests) obtaining and/or reviewing separately obtained history. Performing a medically appropriate examination and/ evaluation.  Counseling and educating the patient/family/caregiver.  Ordering medications, tests, or procedures.  Referring and communicating with other health care professionals.  Documenting clinical information in EPIC.  Independently interpreting results and communicating results to patient/family/caregiver.  Care coordination.

## 2024-05-14 ENCOUNTER — PHARMACY VISIT (OUTPATIENT)
Dept: PHARMACY | Facility: MEDICAL CENTER | Age: 64
End: 2024-05-14
Payer: COMMERCIAL

## 2024-05-14 VITALS
RESPIRATION RATE: 16 BRPM | HEIGHT: 68 IN | BODY MASS INDEX: 29.1 KG/M2 | TEMPERATURE: 97.5 F | WEIGHT: 192.02 LBS | OXYGEN SATURATION: 96 % | HEART RATE: 92 BPM | DIASTOLIC BLOOD PRESSURE: 61 MMHG | SYSTOLIC BLOOD PRESSURE: 121 MMHG

## 2024-05-14 LAB
ALBUMIN SERPL BCP-MCNC: 2 G/DL (ref 3.2–4.9)
ALBUMIN/GLOB SERPL: 0.5 G/DL
ALP SERPL-CCNC: 113 U/L (ref 30–99)
ALT SERPL-CCNC: 14 U/L (ref 2–50)
ANION GAP SERPL CALC-SCNC: 9 MMOL/L (ref 7–16)
AST SERPL-CCNC: 17 U/L (ref 12–45)
BILIRUB SERPL-MCNC: 0.9 MG/DL (ref 0.1–1.5)
BUN SERPL-MCNC: 13 MG/DL (ref 8–22)
CALCIUM ALBUM COR SERPL-MCNC: 9.2 MG/DL (ref 8.5–10.5)
CALCIUM SERPL-MCNC: 7.6 MG/DL (ref 8.5–10.5)
CHLORIDE SERPL-SCNC: 106 MMOL/L (ref 96–112)
CO2 SERPL-SCNC: 21 MMOL/L (ref 20–33)
CREAT SERPL-MCNC: 0.7 MG/DL (ref 0.5–1.4)
ERYTHROCYTE [DISTWIDTH] IN BLOOD BY AUTOMATED COUNT: 47.6 FL (ref 35.9–50)
GFR SERPLBLD CREATININE-BSD FMLA CKD-EPI: 96 ML/MIN/1.73 M 2
GLOBULIN SER CALC-MCNC: 4.2 G/DL (ref 1.9–3.5)
GLUCOSE SERPL-MCNC: 120 MG/DL (ref 65–99)
HCT VFR BLD AUTO: 26.8 % (ref 37–47)
HGB BLD-MCNC: 8.8 G/DL (ref 12–16)
MCH RBC QN AUTO: 26.9 PG (ref 27–33)
MCHC RBC AUTO-ENTMCNC: 32.8 G/DL (ref 32.2–35.5)
MCV RBC AUTO: 82 FL (ref 81.4–97.8)
PLATELET # BLD AUTO: 113 K/UL (ref 164–446)
PMV BLD AUTO: 10.9 FL (ref 9–12.9)
POTASSIUM SERPL-SCNC: 4.1 MMOL/L (ref 3.6–5.5)
PROT SERPL-MCNC: 6.2 G/DL (ref 6–8.2)
RBC # BLD AUTO: 3.27 M/UL (ref 4.2–5.4)
SODIUM SERPL-SCNC: 136 MMOL/L (ref 135–145)
WBC # BLD AUTO: 11.3 K/UL (ref 4.8–10.8)

## 2024-05-14 PROCEDURE — 99239 HOSP IP/OBS DSCHRG MGMT >30: CPT | Performed by: INTERNAL MEDICINE

## 2024-05-14 PROCEDURE — RXMED WILLOW AMBULATORY MEDICATION CHARGE: Performed by: INTERNAL MEDICINE

## 2024-05-14 RX ORDER — FUROSEMIDE 40 MG/1
40 TABLET ORAL DAILY
Qty: 30 TABLET | Refills: 0 | Status: SHIPPED | OUTPATIENT
Start: 2024-05-14 | End: 2024-06-13

## 2024-05-14 RX ORDER — SPIRONOLACTONE 25 MG/1
25 TABLET ORAL DAILY
Qty: 30 TABLET | Refills: 0 | Status: SHIPPED | OUTPATIENT
Start: 2024-05-15 | End: 2024-06-14

## 2024-05-14 RX ADMIN — GABAPENTIN 600 MG: 300 CAPSULE ORAL at 04:13

## 2024-05-14 RX ADMIN — APIXABAN 5 MG: 5 TABLET, FILM COATED ORAL at 04:14

## 2024-05-14 RX ADMIN — LACTULOSE 15 ML: 20 SOLUTION ORAL at 04:13

## 2024-05-14 RX ADMIN — FERROUS GLUCONATE 324 MG: 324 TABLET ORAL at 09:41

## 2024-05-14 RX ADMIN — FUROSEMIDE 40 MG: 10 INJECTION, SOLUTION INTRAMUSCULAR; INTRAVENOUS at 04:15

## 2024-05-14 RX ADMIN — OXYCODONE HYDROCHLORIDE 10 MG: 10 TABLET ORAL at 10:09

## 2024-05-14 RX ADMIN — SPIRONOLACTONE 12.5 MG: 25 TABLET ORAL at 04:14

## 2024-05-14 RX ADMIN — GABAPENTIN 600 MG: 300 CAPSULE ORAL at 13:35

## 2024-05-14 RX ADMIN — OXYCODONE HYDROCHLORIDE 10 MG: 10 TABLET ORAL at 04:19

## 2024-05-14 RX ADMIN — LOSARTAN POTASSIUM 100 MG: 50 TABLET, FILM COATED ORAL at 04:14

## 2024-05-14 RX ADMIN — OMEPRAZOLE 20 MG: 20 CAPSULE, DELAYED RELEASE ORAL at 04:14

## 2024-05-14 ASSESSMENT — COGNITIVE AND FUNCTIONAL STATUS - GENERAL
DRESSING REGULAR LOWER BODY CLOTHING: A LITTLE
HELP NEEDED FOR BATHING: A LITTLE
MOVING TO AND FROM BED TO CHAIR: A LITTLE
MOVING FROM LYING ON BACK TO SITTING ON SIDE OF FLAT BED: A LITTLE
STANDING UP FROM CHAIR USING ARMS: A LITTLE
MOBILITY SCORE: 15
CLIMB 3 TO 5 STEPS WITH RAILING: TOTAL
WALKING IN HOSPITAL ROOM: A LOT
SUGGESTED CMS G CODE MODIFIER DAILY ACTIVITY: CJ
TOILETING: A LITTLE
TURNING FROM BACK TO SIDE WHILE IN FLAT BAD: A LITTLE
DAILY ACTIVITIY SCORE: 21
SUGGESTED CMS G CODE MODIFIER MOBILITY: CK

## 2024-05-14 ASSESSMENT — GAIT ASSESSMENTS: GAIT LEVEL OF ASSIST: UNABLE TO PARTICIPATE

## 2024-05-14 ASSESSMENT — ACTIVITIES OF DAILY LIVING (ADL): TOILETING: INDEPENDENT

## 2024-05-14 NOTE — CARE PLAN
The patient is Stable - Low risk of patient condition declining or worsening    Shift Goals  Clinical Goals: rest  Patient Goals: rest  Family Goals: n/a none present    Progress made toward(s) clinical / shift goals:      Patiens pain will be well managed      Patient is not progressing towards the following goals:

## 2024-05-14 NOTE — DISCHARGE SUMMARY
Discharge Summary    CHIEF COMPLAINT ON ADMISSION  Chief Complaint   Patient presents with    Abdominal Swelling     Brought in by keeshasa from home. C/o abdominal swelling x 1 week. States swelling getting worse, having hard time breathing./ Hx of Liver Cirrhosis.        Reason for Admission  EMS     Admission Date  5/12/2024    CODE STATUS  Full Code    HPI & HOSPITAL COURSE    65 y/o female past medical history of alcohol cirrhosis, type 2 diabetes A-fib not clear why she is not on anticoagulation, previous septic joint of the left shoulder and left knee prosthetic s/p left AKA, remote history of cocaine use presented to emergency room on 5/12/2022 worsening abdominal swelling, shortness of breath.  She has not been using Lasix for a long time because she cannot make it to the bathroom.  In the emergency room WBC 5.8, hemoglobin 8.5, platelets 122, sodium 141, potassium 5.1, creatinine 0.76, ALT 6, AST 18, alk phos 159, INR 1.31  He started on Lasix 40 mg IV twice daily with spironolactone  Troponins are negative.  She is not clear why she has been off anticoagulation. She has multiple EKGs with confirm A-fib in the past.  During this hospitalization she was started on Eliquis.  She was also started on IV Lasix and oral spironolactone.  She did respond very well to diuresing  She did have incomplete abdominal ultrasound and patient is not enough fluid to do a paracentesis.  Therefore she was diuresed with above treatment  Last echocardiogram 01/2024.  No acute findings.  Troponin was negative on this admission.  She denies chest pain.  She denies any urinary symptoms.  Continue fluid restriction 1.5 L a day  PT OT cleared patient to follow-up with outpatient.     Therefore, she is discharged in fair and stable condition to home with close outpatient follow-up.    The patient met 2-midnight criteria for an inpatient stay at the time of discharge.    Discharge Date  5/14/2024    FOLLOW UP ITEMS POST  DISCHARGE  Follow-up with primary care in a week    DISCHARGE DIAGNOSES  Principal Problem:    Anasarca (POA: Yes)  Active Problems:    Alcoholic cirrhosis of liver with ascites (HCC) (POA: Yes)    Thrombocytopenia (HCC) (POA: Yes)    Primary hypertension (POA: Yes)    Paroxysmal atrial fibrillation (POA: Yes)    Anemia (POA: Yes)  Resolved Problems:    * No resolved hospital problems. *      FOLLOW UP  Future Appointments   Date Time Provider Department Center   6/4/2024  3:00 PM ENIO MedeirosT [facility] [place] [address]   6/11/2024  8:45 AM ENIO MedeirosT [facility] [place] [address]   6/18/2024  8:45 AM ENIO MedeirosT [facility] [place] [address]   6/25/2024  9:30 AM ENIO MedeirosT [facility] [facility] [address]     No follow-up provider specified.    MEDICATIONS ON DISCHARGE     Medication List        START taking these medications        Instructions   apixaban 5mg Tabs  Commonly known as: Eliquis   Take 1 Tablet by mouth 2 times a day. Indications: Thromboembolism secondary to Atrial Fibrillation  Dose: 5 mg     spironolactone 25 MG Tabs  Start taking on: May 15, 2024  Commonly known as: Aldactone   Take 1 Tablet by mouth every day for 30 days.  Dose: 25 mg            CHANGE how you take these medications        Instructions   furosemide 40 MG Tabs  What changed:   medication strength  how much to take  Commonly known as: Lasix   Take 1 Tablet by mouth every day for 30 days.  Dose: 40 mg            CONTINUE taking these medications        Instructions   atorvastatin 20 MG Tabs  Commonly known as: Lipitor   Take 1 Tablet by mouth every evening.  Dose: 20 mg     ferrous sulfate 325 (65 Fe) MG tablet   Take 325 mg by mouth every day.  Dose: 325 mg     gabapentin 300 MG Caps  Commonly known as: Neurontin   Take 2 Capsules by mouth every 8 hours.  Dose: 600 mg     lactulose 20 GM/30ML Soln   Take 15 mL by mouth every day.  Dose: 15 mL     losartan 100 MG Tabs  Commonly known as: Cozaar   Take 100 mg by mouth every day.  Dose: 100 mg      metoprolol SR 25 MG Tb24  Commonly known as: Toprol XL   Take 1 Tablet by mouth every evening.  Dose: 25 mg     ondansetron 4 MG Tbdp  Commonly known as: Zofran ODT   Take 1 Tablet by mouth every four hours as needed for Nausea/Vomiting (give PO if no IV route available).  Dose: 4 mg     oxyCODONE immediate release 10 MG immediate release tablet  Commonly known as: Roxicodone   Take 10 mg by mouth every 6 hours as needed for Severe Pain.  Dose: 10 mg     pantoprazole 20 MG tablet  Commonly known as: Protonix   Take 20 mg by mouth every day.  Dose: 20 mg     Spiriva HandiHaler 18 MCG Caps  Generic drug: tiotropium   Place 1 Capsule into inhaler and inhale every day.  Dose: 1 Capsule            STOP taking these medications      potassium chloride 10 MEQ capsule  Commonly known as: Micro-K              Allergies  No Known Allergies    DIET  Orders Placed This Encounter   Procedures    Diet Order Diet: 2 Gram Sodium; Fluid modifications: (optional): 1500 ml Fluid Restriction     Standing Status:   Standing     Number of Occurrences:   1     Order Specific Question:   Diet:     Answer:   2 Gram Sodium [7]     Order Specific Question:   Fluid modifications: (optional)     Answer:   1500 ml Fluid Restriction [9]       ACTIVITY  As tolerated.  Weight bearing as tolerated    CONSULTATIONS  none    PROCEDURES  none    LABORATORY  Lab Results   Component Value Date    SODIUM 136 05/14/2024    POTASSIUM 4.1 05/14/2024    CHLORIDE 106 05/14/2024    CO2 21 05/14/2024    GLUCOSE 120 (H) 05/14/2024    BUN 13 05/14/2024    CREATININE 0.70 05/14/2024    CREATININE 0.9 03/12/2009        Lab Results   Component Value Date    WBC 11.3 (H) 05/14/2024    HEMOGLOBIN 8.8 (L) 05/14/2024    HEMATOCRIT 26.8 (L) 05/14/2024    PLATELETCT 113 (L) 05/14/2024        Total time of the discharge process exceeds 35 minutes.

## 2024-05-14 NOTE — PROGRESS NOTES
Discharge instructions reviewed and signed by patient. No further questions at this time. IV DC'd by RN. Medications delivered to patient in discharge lounge. Patient left with all belongings.

## 2024-05-14 NOTE — DISCHARGE PLANNING
Meds-to-Beds: Discharge prescription orders listed below delivered to patient in discharge [facility]. RN notified. Patient declined counseling. Written information regarding the dispensed prescriptions was provided to the patient including the phone number of the pharmacy to call for any questions.       Current Outpatient Medications   Medication Sig Dispense Refill    furosemide (LASIX) 40 MG Tab Take 1 Tablet by mouth every day for 30 days. 30 Tablet 0    apixaban (ELIQUIS) 5mg Tab Take 1 Tablet by mouth 2 times a day. Indications: Thromboembolism secondary to Atrial Fibrillation 60 Tablet 0    [START ON 5/15/2024] spironolactone (ALDACTONE) 25 MG Tab Take 1 Tablet by mouth every day for 30 days. 30 Tablet 0      Maris Sawyer, PharmD

## 2024-05-14 NOTE — THERAPY
Physical Therapy   Initial Evaluation     Patient Name: April Alicia  Age:  64 y.o., Sex:  female  Medical Record #: [id number]  Today's Date: 5/14/2024     Precautions  Precautions: Fall Risk  Comments: L AKA    Assessment  Patient is 64 y.o. female admitted with anasarca due to decompensated cirrhosis & noncompliance with furosemide.  PMH includes paroxysmal A fib, primary HTN, thrombocytopenia, alcoholic cirrhosis with ascites and L AKA on 1/16/24.  Pt was seen for PT evaluation, demo'd functional mobility including stand pivot to WC & hallway WC mobility with SPV.  Therapeutiic activity provided in conjunction with PT evaluation including: pt self-propelling WC; education on alternative ways to transfer & use WC (i.e. removable arm rests,positiong); transfer WC > bed.  Educated pt on importance of daily mobility with nursing staff including mobilizing to toilet/BSC as needed & sitting up in wheelchair/chair 3x/day for meals.  Pt appears to be at/near her functional baseline.  Patient will not be actively followed for physical therapy services at this time, however may be seen if requested by physician for 1 more visit within 30 days to address any discharge or equipment needs.    Plan    Physical Therapy Initial Treatment Plan   Duration: Discharge Needs Only    DC Equipment Recommendations: None  Discharge Recommendations: Anticipate that the patient will have no further physical therapy needs after discharge from the hospital       Objective     05/14/24 1053   Precautions   Precautions Fall Risk   Comments L AKA   Pain 0 - 10 Group   Therapist Pain Assessment Post Activity Pain Same as Prior to Activity;Nurse Notified;0   Prior Living Situation   Prior Services Intermittent Physical Support for ADL Per Family   Housing / Facility 1 Story House   Steps Into Home 2   Equipment Owned Wheelchair (cushion)   Lives with - Patient's Self Care Capacity Adult Children (Daughter)   Prior Level of Functional  Mobility   Bed Mobility Independent   Transfer Status Independent   Assistive Devices Used Wheelchair   Wheelchair Independent   Stairs Required Assist   Comments Pt reported her daughter bumps her up the two steps in her WC; pt wearing stump  for future prosthetic use.   Cognition    Cognition / Consciousness WDL   Comments [place], pleasant & recptive   Active ROM Lower Body    Active ROM Lower Body  WDL   Comments RLE WFL   Strength Lower Body   Lower Body Strength  WDL   Comments RLE WFL   Sensation Lower Body   Comments Endorsed RLE numbness at baseline   Other Treatments   Other Treatments Provided Therapeutiic activity provided in conjunction with PT evaluation including: pt self-propelling WC; education on alternative ways to transfer & use WC (i.e. removable arm rests,positiong); transfer WC > bed.   Balance Assessment   Sitting Balance (Static) Fair   Sitting Balance (Dynamic) Fair   Standing Balance (Static) Fair   Standing Balance (Dynamic) Fair -   Weight Shift Sitting Fair   Weight Shift Standing Fair   Bed Mobility    Supine to Sit Supervised   Sit to Supine Supervised   Scooting Supervised   Comments HOB nearly flat   Gait Analysis   Gait Level Of Assist Unable to Participate   Comments Pt does not ambulate at baseline, is looking into a prosthesis.   Functional Mobility   Sit to Stand Supervised   Bed, Chair, Wheelchair Transfer Supervised   Transfer Method Stand Pivot   Mobility supine > EOB > WC > back to bed   Activity Tolerance   Sitting in Chair 8-10 min   Sitting Edge of Bed 5+ min   Standing <2 min total   Education Group   Education Provided Role of Physical Therapist   Role of Physical Therapist Patient Response Patient;Acceptance;Explanation;Verbal Demonstration   Physical Therapy Initial Treatment Plan    Duration Discharge Needs Only

## 2024-05-14 NOTE — THERAPY
"Occupational Therapy   Initial Evaluation     Patient Name: April Alicia  Age:  64 y.o., Sex:  female  Medical Record #: [id number]  Today's Date: 5/14/2024     Precautions  Precautions: Fall Risk  Comments: L AKA    Assessment  Patient is 64 y.o. female admitted for abdominal edema and SOB, found to have decompensated liver cirrhosis and med noncompliance. PMHx of L AKA, ETOH cirrhosis with ascites, DMII, afib, and Hep C. Per chart, \"she does not take [her medication] because she is afraid she will not make it to the bathroom in time due to her amputation and the fact that she is alone at home usually\"; educated on and recommended BSC. Pt reports she is near her functional baseline and is eagerly awaiting getting her prosthetic RLE. Reports lives with her dtr who is able to assist intermittently when not working. Reports has an SO who can also provide intermittent assist.  Patient will not be actively followed for occupational therapy services at this time, however may be seen if requested by physician for 1 more visit within 30 days to address any discharge or equipment needs.     Plan    Occupational Therapy Initial Treatment Plan   Duration: Discharge Needs Only    DC Equipment Recommendations: Bed Side Commode  Discharge Recommendations: Recommend outpatient occupational therapy services to address higher level deficits (reports was getting OP therapy prior to admit; recommend continuing)      Objective     05/14/24 1221   Prior Living Situation   Prior Services Intermittent Physical Support for ADL Per Family   Housing / Facility 1 Story Apartment / Condo   Steps Into Home 0   Bathroom Set up Walk In Shower;Shower Chair   Equipment Owned Front-Wheel Walker;4-Wheel Walker;Single Point Cane;Wheelchair;Tub / Shower Seat   Lives with - Patient's Self Care Capacity Adult Children   Comments Reports lives with her dtr who is able to assist intermittently when not working. Reports has an SO who can also " provide intermittent assist.   Prior Level of ADL Function   Self Feeding Independent   Grooming / Hygiene Independent   Bathing Requires Assist   Dressing Independent   Toileting Independent   Prior Level of IADL Function   Medication Management Requires Assist   Laundry Requires Assist   Kitchen Mobility Requires Assist   Finances Requires Assist   Home Management Requires Assist   Shopping Requires Assist   Prior Level Of Mobility Uses Wheel Chair for in Home Mobility   Driving / Transportation Relatives / Others Provide Transportation   Precautions   Precautions Fall Risk   Vitals   O2 Delivery Device None - Room Air   Pain 0 - 10 Group   Location Abdomen   Therapist Pain Assessment Post Activity;During Activity;Nurse Notified  (not quantified)   Cognition    Cognition / Consciousness X   Speech/ Communication Hard of Hearing;Delayed Responses   Safety Awareness Impaired   Attention Impaired   Comments very pleasant and cooperative; reports she is near her functional baseline and is eagerly awaiting getting her prosthetic RLE. Pt with questionable retention of education, recommend reinforcement   Passive ROM Upper Body   Passive ROM Upper Body WDL   Active ROM Upper Body   Active ROM Upper Body  WDL   Strength Upper Body   Upper Body Strength  WDL   Coordination Upper Body   Coordination WDL   Balance Assessment   Sitting Balance (Static) Good   Sitting Balance (Dynamic) Fair +   Weight Shift Sitting Good   Comments seated only; declined OOB activity as reports just returned to bed after w/c mobility w/PT.   Bed Mobility    Supine to Sit Supervised   Sit to Supine Supervised   Scooting Supervised   Comments HOB flat   ADL Assessment   Eating Supervision   Grooming Supervision;Seated   Lower Body Dressing Minimal Assist   Toileting   (declined need)   Comments Educated pt on home safety, adaptive techniques for ADLs, BSC for next to bed/chair to ensure she takes her meds despite having to urinate frequently,  importance of taking medication despite side effects, and importance of continue OOB/EOB activity.   Functional Mobility   Sit to Stand   (declined; per PT she is able to complete SPTxf to w/c)   Mobility EOB only, reports just BTB from PT session, but feels she will have no difficulty with txfs/mobility at home with family assistance   Edema / Skin Assessment   Edema / Skin  Not Assessed   Activity Tolerance   Comments limited by fatigue   Education Group   Education Provided Role of Occupational Therapist;Pathology of bedrest;Adaptive Equipment   Role of Occupational Therapist Patient Response Patient;Acceptance;Explanation;Verbal Demonstration;Reinforcement Needed   Adaptive Equipment Patient Response Patient;Acceptance;Explanation;Verbal Demonstration;Reinforcement Needed   Pathology of Bedrest Patient Response Patient;Acceptance;Explanation;Verbal Demonstration;Reinforcement Needed

## 2024-05-16 ENCOUNTER — APPOINTMENT (OUTPATIENT)
Dept: RADIOLOGY | Facility: MEDICAL CENTER | Age: 64
DRG: 871 | End: 2024-05-16
Attending: STUDENT IN AN ORGANIZED HEALTH CARE EDUCATION/TRAINING PROGRAM
Payer: MEDICAID

## 2024-05-16 ENCOUNTER — HOSPITAL ENCOUNTER (INPATIENT)
Facility: MEDICAL CENTER | Age: 64
LOS: 9 days | DRG: 871 | End: 2024-05-25
Attending: STUDENT IN AN ORGANIZED HEALTH CARE EDUCATION/TRAINING PROGRAM | Admitting: STUDENT IN AN ORGANIZED HEALTH CARE EDUCATION/TRAINING PROGRAM
Payer: MEDICAID

## 2024-05-16 DIAGNOSIS — I48.91 ATRIAL FIBRILLATION, UNSPECIFIED TYPE (HCC): ICD-10-CM

## 2024-05-16 DIAGNOSIS — R65.20 SEPSIS WITH ACUTE RENAL FAILURE WITHOUT SEPTIC SHOCK, DUE TO UNSPECIFIED ORGANISM, UNSPECIFIED ACUTE RENAL FAILURE TYPE (HCC): ICD-10-CM

## 2024-05-16 DIAGNOSIS — N17.9 ACUTE KIDNEY INJURY (HCC): ICD-10-CM

## 2024-05-16 DIAGNOSIS — R53.81 PHYSICAL DECONDITIONING: ICD-10-CM

## 2024-05-16 DIAGNOSIS — N17.9 SEPSIS WITH ACUTE RENAL FAILURE WITHOUT SEPTIC SHOCK, DUE TO UNSPECIFIED ORGANISM, UNSPECIFIED ACUTE RENAL FAILURE TYPE (HCC): ICD-10-CM

## 2024-05-16 DIAGNOSIS — K70.31 ALCOHOLIC CIRRHOSIS OF LIVER WITH ASCITES (HCC): ICD-10-CM

## 2024-05-16 DIAGNOSIS — J18.9 PNEUMONIA OF LEFT LOWER LOBE DUE TO INFECTIOUS ORGANISM: ICD-10-CM

## 2024-05-16 DIAGNOSIS — N17.0 ACUTE KIDNEY INJURY (AKI) WITH ACUTE TUBULAR NECROSIS (ATN) (HCC): ICD-10-CM

## 2024-05-16 DIAGNOSIS — M25.561 CHRONIC PAIN OF BOTH KNEES: ICD-10-CM

## 2024-05-16 DIAGNOSIS — M51.36 DEGENERATION OF LUMBAR INTERVERTEBRAL DISC: ICD-10-CM

## 2024-05-16 DIAGNOSIS — Z79.891 LONG-TERM CURRENT USE OF OPIATE ANALGESIC: ICD-10-CM

## 2024-05-16 DIAGNOSIS — G89.29 CHRONIC PAIN OF BOTH KNEES: ICD-10-CM

## 2024-05-16 DIAGNOSIS — G89.4 CHRONIC PAIN SYNDROME: ICD-10-CM

## 2024-05-16 DIAGNOSIS — M25.562 CHRONIC PAIN OF BOTH KNEES: ICD-10-CM

## 2024-05-16 DIAGNOSIS — R31.9 HEMATURIA, UNSPECIFIED TYPE: ICD-10-CM

## 2024-05-16 DIAGNOSIS — A41.9 SEPSIS WITH ACUTE RENAL FAILURE WITHOUT SEPTIC SHOCK, DUE TO UNSPECIFIED ORGANISM, UNSPECIFIED ACUTE RENAL FAILURE TYPE (HCC): ICD-10-CM
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
## 2024-05-16 PROBLEM — R73.9 HYPERGLYCEMIA: Status: ACTIVE | Noted: 2024-05-16

## 2024-05-16 PROBLEM — R11.2 NAUSEA & VOMITING: Status: ACTIVE | Noted: 2024-01-22

## 2024-05-16 LAB
ALBUMIN SERPL BCP-MCNC: 2.4 G/DL (ref 3.2–4.9)
ALBUMIN/GLOB SERPL: 0.5 G/DL
ALP SERPL-CCNC: 160 U/L (ref 30–99)
ALT SERPL-CCNC: 18 U/L (ref 2–50)
ANION GAP SERPL CALC-SCNC: 13 MMOL/L (ref 7–16)
APPEARANCE UR: ABNORMAL
AST SERPL-CCNC: 38 U/L (ref 12–45)
BACTERIA #/AREA URNS HPF: ABNORMAL /HPF
BASOPHILS # BLD AUTO: 0.5 % (ref 0–1.8)
BASOPHILS # BLD: 0.07 K/UL (ref 0–0.12)
BILIRUB SERPL-MCNC: 2 MG/DL (ref 0.1–1.5)
BILIRUB UR QL STRIP.AUTO: ABNORMAL
BUN SERPL-MCNC: 28 MG/DL (ref 8–22)
CALCIUM ALBUM COR SERPL-MCNC: 8.7 MG/DL (ref 8.5–10.5)
CALCIUM SERPL-MCNC: 7.4 MG/DL (ref 8.5–10.5)
CHLORIDE SERPL-SCNC: 100 MMOL/L (ref 96–112)
CO2 SERPL-SCNC: 18 MMOL/L (ref 20–33)
COLOR UR: ABNORMAL
CREAT SERPL-MCNC: 1.42 MG/DL (ref 0.5–1.4)
EOSINOPHIL # BLD AUTO: 0.01 K/UL (ref 0–0.51)
EOSINOPHIL NFR BLD: 0.1 % (ref 0–6.9)
EPI CELLS #/AREA URNS HPF: ABNORMAL /HPF
ERYTHROCYTE [DISTWIDTH] IN BLOOD BY AUTOMATED COUNT: 47.4 FL (ref 35.9–50)
GFR SERPLBLD CREATININE-BSD FMLA CKD-EPI: 41 ML/MIN/1.73 M 2
GLOBULIN SER CALC-MCNC: 4.6 G/DL (ref 1.9–3.5)
GLUCOSE SERPL-MCNC: 161 MG/DL (ref 65–99)
GLUCOSE UR STRIP.AUTO-MCNC: ABNORMAL MG/DL
HCT VFR BLD AUTO: 29.4 % (ref 37–47)
HGB BLD-MCNC: 9.8 G/DL (ref 12–16)
HYALINE CASTS #/AREA URNS LPF: ABNORMAL /LPF
IMM GRANULOCYTES # BLD AUTO: 0.14 K/UL (ref 0–0.11)
IMM GRANULOCYTES NFR BLD AUTO: 1 % (ref 0–0.9)
KETONES UR STRIP.AUTO-MCNC: ABNORMAL MG/DL
LACTATE SERPL-SCNC: 1.8 MMOL/L (ref 0.5–2)
LACTATE SERPL-SCNC: 2.6 MMOL/L (ref 0.5–2)
LEUKOCYTE ESTERASE UR QL STRIP.AUTO: ABNORMAL
LIPASE SERPL-CCNC: 16 U/L (ref 11–82)
LYMPHOCYTES # BLD AUTO: 1.44 K/UL (ref 1–4.8)
LYMPHOCYTES NFR BLD: 10.2 % (ref 22–41)
MCH RBC QN AUTO: 26.9 PG (ref 27–33)
MCHC RBC AUTO-ENTMCNC: 33.3 G/DL (ref 32.2–35.5)
MCV RBC AUTO: 80.8 FL (ref 81.4–97.8)
MICRO URNS: ABNORMAL
MONOCYTES # BLD AUTO: 0.91 K/UL (ref 0–0.85)
MONOCYTES NFR BLD AUTO: 6.4 % (ref 0–13.4)
NEUTROPHILS # BLD AUTO: 11.58 K/UL (ref 1.82–7.42)
NEUTROPHILS NFR BLD: 81.8 % (ref 44–72)
NITRITE UR QL STRIP.AUTO: ABNORMAL
NRBC # BLD AUTO: 0 K/UL
NRBC BLD-RTO: 0 /100 WBC (ref 0–0.2)
PH UR STRIP.AUTO: ABNORMAL [PH] (ref 5–8)
PLATELET # BLD AUTO: 106 K/UL (ref 164–446)
PMV BLD AUTO: 11.4 FL (ref 9–12.9)
POTASSIUM SERPL-SCNC: 4.1 MMOL/L (ref 3.6–5.5)
PROT SERPL-MCNC: 7 G/DL (ref 6–8.2)
PROT UR QL STRIP: ABNORMAL MG/DL
RBC # BLD AUTO: 3.64 M/UL (ref 4.2–5.4)
RBC # URNS HPF: >150 /HPF
RBC UR QL AUTO: ABNORMAL
SODIUM SERPL-SCNC: 131 MMOL/L (ref 135–145)
SP GR UR STRIP.AUTO: ABNORMAL
UROBILINOGEN UR STRIP.AUTO-MCNC: ABNORMAL MG/DL
WBC # BLD AUTO: 14.2 K/UL (ref 4.8–10.8)
WBC #/AREA URNS HPF: ABNORMAL /HPF

## 2024-05-16 PROCEDURE — 85025 COMPLETE CBC W/AUTO DIFF WBC: CPT

## 2024-05-16 PROCEDURE — 96365 THER/PROPH/DIAG IV INF INIT: CPT

## 2024-05-16 PROCEDURE — 93005 ELECTROCARDIOGRAM TRACING: CPT

## 2024-05-16 PROCEDURE — 83036 HEMOGLOBIN GLYCOSYLATED A1C: CPT

## 2024-05-16 PROCEDURE — 81001 URINALYSIS AUTO W/SCOPE: CPT

## 2024-05-16 PROCEDURE — 87181 SC STD AGAR DILUTION PER AGT: CPT

## 2024-05-16 PROCEDURE — 84145 PROCALCITONIN (PCT): CPT

## 2024-05-16 PROCEDURE — 87147 CULTURE TYPE IMMUNOLOGIC: CPT

## 2024-05-16 PROCEDURE — 87086 URINE CULTURE/COLONY COUNT: CPT

## 2024-05-16 PROCEDURE — 87015 SPECIMEN INFECT AGNT CONCNTJ: CPT

## 2024-05-16 PROCEDURE — 770020 HCHG ROOM/CARE - TELE (206)

## 2024-05-16 PROCEDURE — 87186 SC STD MICRODIL/AGAR DIL: CPT

## 2024-05-16 PROCEDURE — 99223 1ST HOSP IP/OBS HIGH 75: CPT | Performed by: STUDENT IN AN ORGANIZED HEALTH CARE EDUCATION/TRAINING PROGRAM

## 2024-05-16 PROCEDURE — 74177 CT ABD & PELVIS W/CONTRAST: CPT

## 2024-05-16 PROCEDURE — 700105 HCHG RX REV CODE 258: Performed by: STUDENT IN AN ORGANIZED HEALTH CARE EDUCATION/TRAINING PROGRAM

## 2024-05-16 PROCEDURE — 80074 ACUTE HEPATITIS PANEL: CPT

## 2024-05-16 PROCEDURE — 87040 BLOOD CULTURE FOR BACTERIA: CPT | Mod: 91

## 2024-05-16 PROCEDURE — 99285 EMERGENCY DEPT VISIT HI MDM: CPT

## 2024-05-16 PROCEDURE — 71045 X-RAY EXAM CHEST 1 VIEW: CPT

## 2024-05-16 PROCEDURE — 700105 HCHG RX REV CODE 258: Mod: UD | Performed by: STUDENT IN AN ORGANIZED HEALTH CARE EDUCATION/TRAINING PROGRAM

## 2024-05-16 PROCEDURE — 83690 ASSAY OF LIPASE: CPT

## 2024-05-16 PROCEDURE — 700111 HCHG RX REV CODE 636 W/ 250 OVERRIDE (IP): Performed by: STUDENT IN AN ORGANIZED HEALTH CARE EDUCATION/TRAINING PROGRAM

## 2024-05-16 PROCEDURE — 36415 COLL VENOUS BLD VENIPUNCTURE: CPT

## 2024-05-16 PROCEDURE — 86704 HEP B CORE ANTIBODY TOTAL: CPT

## 2024-05-16 PROCEDURE — 83605 ASSAY OF LACTIC ACID: CPT

## 2024-05-16 PROCEDURE — 87522 HEPATITIS C REVRS TRNSCRPJ: CPT

## 2024-05-16 PROCEDURE — 700117 HCHG RX CONTRAST REV CODE 255: Mod: UD | Performed by: STUDENT IN AN ORGANIZED HEALTH CARE EDUCATION/TRAINING PROGRAM

## 2024-05-16 PROCEDURE — 93005 ELECTROCARDIOGRAM TRACING: CPT | Performed by: STUDENT IN AN ORGANIZED HEALTH CARE EDUCATION/TRAINING PROGRAM

## 2024-05-16 PROCEDURE — 80053 COMPREHEN METABOLIC PANEL: CPT

## 2024-05-16 PROCEDURE — 700102 HCHG RX REV CODE 250 W/ 637 OVERRIDE(OP): Mod: UD | Performed by: STUDENT IN AN ORGANIZED HEALTH CARE EDUCATION/TRAINING PROGRAM

## 2024-05-16 PROCEDURE — 700111 HCHG RX REV CODE 636 W/ 250 OVERRIDE (IP): Mod: JZ,UD | Performed by: STUDENT IN AN ORGANIZED HEALTH CARE EDUCATION/TRAINING PROGRAM

## 2024-05-16 PROCEDURE — A9270 NON-COVERED ITEM OR SERVICE: HCPCS | Mod: UD | Performed by: STUDENT IN AN ORGANIZED HEALTH CARE EDUCATION/TRAINING PROGRAM

## 2024-05-16 PROCEDURE — 96367 TX/PROPH/DG ADDL SEQ IV INF: CPT

## 2024-05-16 PROCEDURE — 96375 TX/PRO/DX INJ NEW DRUG ADDON: CPT

## 2024-05-16 PROCEDURE — 87077 CULTURE AEROBIC IDENTIFY: CPT | Mod: 91

## 2024-05-16 RX ORDER — ACETAMINOPHEN 500 MG
1000 TABLET ORAL ONCE
Status: COMPLETED | OUTPATIENT
Start: 2024-05-16 | End: 2024-05-16

## 2024-05-16 RX ORDER — METOPROLOL SUCCINATE 25 MG/1
25 TABLET, EXTENDED RELEASE ORAL NIGHTLY
Status: DISCONTINUED | OUTPATIENT
Start: 2024-05-16 | End: 2024-05-17

## 2024-05-16 RX ORDER — LOSARTAN POTASSIUM 100 MG/1
100 TABLET ORAL DAILY
Status: ON HOLD | COMMUNITY
Start: 2024-01-10 | End: 2024-05-25

## 2024-05-16 RX ORDER — GABAPENTIN 400 MG/1
400 CAPSULE ORAL 2 TIMES DAILY
Status: DISCONTINUED | OUTPATIENT
Start: 2024-05-17 | End: 2024-05-17

## 2024-05-16 RX ORDER — TIOTROPIUM BROMIDE 18 UG/1
1 CAPSULE ORAL; RESPIRATORY (INHALATION) DAILY
Status: DISCONTINUED | OUTPATIENT
Start: 2024-05-17 | End: 2024-05-17

## 2024-05-16 RX ORDER — ALBUTEROL SULFATE 90 UG/1
2 AEROSOL, METERED RESPIRATORY (INHALATION) EVERY 4 HOURS
COMMUNITY
Start: 2024-05-07

## 2024-05-16 RX ORDER — NITROFURANTOIN 25; 75 MG/1; MG/1
1 CAPSULE ORAL 2 TIMES DAILY
Status: ON HOLD | COMMUNITY
Start: 2024-04-17 | End: 2024-05-25

## 2024-05-16 RX ORDER — ATORVASTATIN CALCIUM 20 MG/1
20 TABLET, FILM COATED ORAL NIGHTLY
Status: DISCONTINUED | OUTPATIENT
Start: 2024-05-17 | End: 2024-05-25 | Stop reason: HOSPADM

## 2024-05-16 RX ORDER — ACETAMINOPHEN 325 MG/1
650 TABLET ORAL EVERY 6 HOURS PRN
Status: DISCONTINUED | OUTPATIENT
Start: 2024-05-16 | End: 2024-05-21

## 2024-05-16 RX ORDER — ONDANSETRON 4 MG/1
4 TABLET, ORALLY DISINTEGRATING ORAL EVERY 4 HOURS PRN
Status: DISCONTINUED | OUTPATIENT
Start: 2024-05-16 | End: 2024-05-25 | Stop reason: HOSPADM

## 2024-05-16 RX ORDER — LINEZOLID 2 MG/ML
600 INJECTION, SOLUTION INTRAVENOUS ONCE
Status: COMPLETED | OUTPATIENT
Start: 2024-05-16 | End: 2024-05-17

## 2024-05-16 RX ORDER — SODIUM CHLORIDE 9 MG/ML
INJECTION, SOLUTION INTRAVENOUS CONTINUOUS
Status: DISCONTINUED | OUTPATIENT
Start: 2024-05-16 | End: 2024-05-17

## 2024-05-16 RX ORDER — OXYCODONE HYDROCHLORIDE 10 MG/1
10 TABLET ORAL EVERY 6 HOURS PRN
Status: DISCONTINUED | OUTPATIENT
Start: 2024-05-16 | End: 2024-05-18

## 2024-05-16 RX ORDER — FERROUS SULFATE 325(65) MG
325 TABLET ORAL DAILY
Status: DISCONTINUED | OUTPATIENT
Start: 2024-05-17 | End: 2024-05-17

## 2024-05-16 RX ORDER — LACTULOSE 20 G/30ML
15 SOLUTION ORAL DAILY
Status: DISCONTINUED | OUTPATIENT
Start: 2024-05-17 | End: 2024-05-18

## 2024-05-16 RX ORDER — ONDANSETRON 2 MG/ML
4 INJECTION INTRAMUSCULAR; INTRAVENOUS EVERY 4 HOURS PRN
Status: DISCONTINUED | OUTPATIENT
Start: 2024-05-16 | End: 2024-05-25 | Stop reason: HOSPADM

## 2024-05-16 RX ORDER — SODIUM CHLORIDE, SODIUM LACTATE, POTASSIUM CHLORIDE, CALCIUM CHLORIDE 600; 310; 30; 20 MG/100ML; MG/100ML; MG/100ML; MG/100ML
INJECTION, SOLUTION INTRAVENOUS CONTINUOUS
Status: DISCONTINUED | OUTPATIENT
Start: 2024-05-16 | End: 2024-05-16

## 2024-05-16 RX ORDER — HYDRALAZINE HYDROCHLORIDE 20 MG/ML
10 INJECTION INTRAMUSCULAR; INTRAVENOUS EVERY 4 HOURS PRN
Status: DISCONTINUED | OUTPATIENT
Start: 2024-05-16 | End: 2024-05-18

## 2024-05-16 RX ORDER — LINEZOLID 2 MG/ML
600 INJECTION, SOLUTION INTRAVENOUS EVERY 12 HOURS
Qty: 3000 ML | Refills: 0 | Status: DISCONTINUED | OUTPATIENT
Start: 2024-05-16 | End: 2024-05-17

## 2024-05-16 RX ORDER — SODIUM CHLORIDE, SODIUM LACTATE, POTASSIUM CHLORIDE, CALCIUM CHLORIDE 600; 310; 30; 20 MG/100ML; MG/100ML; MG/100ML; MG/100ML
1000 INJECTION, SOLUTION INTRAVENOUS ONCE
Status: COMPLETED | OUTPATIENT
Start: 2024-05-16 | End: 2024-05-16

## 2024-05-16 RX ORDER — ONDANSETRON 2 MG/ML
4 INJECTION INTRAMUSCULAR; INTRAVENOUS ONCE
Status: COMPLETED | OUTPATIENT
Start: 2024-05-16 | End: 2024-05-16

## 2024-05-16 RX ORDER — OMEPRAZOLE 20 MG/1
20 CAPSULE, DELAYED RELEASE ORAL DAILY
Status: DISCONTINUED | OUTPATIENT
Start: 2024-05-17 | End: 2024-05-17

## 2024-05-16 RX ORDER — GABAPENTIN 400 MG/1
400 CAPSULE ORAL 2 TIMES DAILY
Status: ON HOLD | COMMUNITY
Start: 2024-05-07 | End: 2024-05-25

## 2024-05-16 RX ORDER — GUAIFENESIN/DEXTROMETHORPHAN 100-10MG/5
10 SYRUP ORAL EVERY 6 HOURS PRN
Status: DISCONTINUED | OUTPATIENT
Start: 2024-05-16 | End: 2024-05-25 | Stop reason: HOSPADM

## 2024-05-16 RX ADMIN — ONDANSETRON 4 MG: 2 INJECTION INTRAMUSCULAR; INTRAVENOUS at 20:28

## 2024-05-16 RX ADMIN — PIPERACILLIN AND TAZOBACTAM 4.5 G: 4; .5 INJECTION, POWDER, FOR SOLUTION INTRAVENOUS at 23:28

## 2024-05-16 RX ADMIN — FENTANYL CITRATE 50 MCG: 50 INJECTION, SOLUTION INTRAMUSCULAR; INTRAVENOUS at 20:28

## 2024-05-16 RX ADMIN — ACETAMINOPHEN 1000 MG: 500 TABLET, FILM COATED ORAL at 22:11

## 2024-05-16 RX ADMIN — LINEZOLID 600 MG: 600 INJECTION, SOLUTION INTRAVENOUS at 23:58

## 2024-05-16 RX ADMIN — IOHEXOL 100 ML: 350 INJECTION, SOLUTION INTRAVENOUS at 21:20

## 2024-05-16 RX ADMIN — SODIUM CHLORIDE, POTASSIUM CHLORIDE, SODIUM LACTATE AND CALCIUM CHLORIDE 1000 ML: 600; 310; 30; 20 INJECTION, SOLUTION INTRAVENOUS at 20:29

## 2024-05-16 ASSESSMENT — ENCOUNTER SYMPTOMS
SPUTUM PRODUCTION: 0
PHOTOPHOBIA: 0
WEIGHT LOSS: 0
NECK PAIN: 0
VOMITING: 1
ORTHOPNEA: 0
SHORTNESS OF BREATH: 1
DIZZINESS: 0
TINGLING: 0
FEVER: 1
DIARRHEA: 1
HEADACHES: 0
NAUSEA: 1
BLURRED VISION: 0
BACK PAIN: 0
HALLUCINATIONS: 0
NERVOUS/ANXIOUS: 0
STRIDOR: 0
INSOMNIA: 0
PALPITATIONS: 0
TREMORS: 0
SENSORY CHANGE: 0
EYE PAIN: 0
CONSTIPATION: 0
DIAPHORESIS: 0
COUGH: 1
ABDOMINAL PAIN: 1
SINUS PAIN: 0
DOUBLE VISION: 0
EYE DISCHARGE: 0
CHILLS: 1
BLOOD IN STOOL: 0
HEMOPTYSIS: 0

## 2024-05-16 ASSESSMENT — LIFESTYLE VARIABLES: SUBSTANCE_ABUSE: 0

## 2024-05-16 ASSESSMENT — FIBROSIS 4 INDEX: FIB4 SCORE: 5.41

## 2024-05-17 ENCOUNTER — APPOINTMENT (OUTPATIENT)
Dept: RADIOLOGY | Facility: MEDICAL CENTER | Age: 64
DRG: 871 | End: 2024-05-17
Payer: MEDICAID

## 2024-05-17 PROBLEM — J96.01 ACUTE HYPOXEMIC RESPIRATORY FAILURE (HCC): Status: ACTIVE | Noted: 2024-05-17

## 2024-05-17 LAB
ALBUMIN SERPL BCP-MCNC: 2.1 G/DL (ref 3.2–4.9)
ALBUMIN/GLOB SERPL: 0.5 G/DL
ALP SERPL-CCNC: 135 U/L (ref 30–99)
ALT SERPL-CCNC: 15 U/L (ref 2–50)
ANION GAP SERPL CALC-SCNC: 10 MMOL/L (ref 7–16)
AST SERPL-CCNC: 28 U/L (ref 12–45)
BILIRUB SERPL-MCNC: 1.7 MG/DL (ref 0.1–1.5)
BUN SERPL-MCNC: 31 MG/DL (ref 8–22)
CALCIUM ALBUM COR SERPL-MCNC: 8.2 MG/DL (ref 8.5–10.5)
CALCIUM SERPL-MCNC: 6.7 MG/DL (ref 8.5–10.5)
CHLORIDE SERPL-SCNC: 100 MMOL/L (ref 96–112)
CO2 SERPL-SCNC: 19 MMOL/L (ref 20–33)
CREAT SERPL-MCNC: 1.67 MG/DL (ref 0.5–1.4)
ERYTHROCYTE [DISTWIDTH] IN BLOOD BY AUTOMATED COUNT: 47.5 FL (ref 35.9–50)
EST. AVERAGE GLUCOSE BLD GHB EST-MCNC: 105 MG/DL
FLUAV RNA SPEC QL NAA+PROBE: NEGATIVE
FLUBV RNA SPEC QL NAA+PROBE: NEGATIVE
GFR SERPLBLD CREATININE-BSD FMLA CKD-EPI: 34 ML/MIN/1.73 M 2
GLOBULIN SER CALC-MCNC: 3.9 G/DL (ref 1.9–3.5)
GLUCOSE SERPL-MCNC: 125 MG/DL (ref 65–99)
GRAM STN SPEC: NORMAL
HAV IGM SERPL QL IA: ABNORMAL
HBA1C MFR BLD: 5.3 % (ref 4–5.6)
HBV CORE AB SERPL QL IA: NONREACTIVE
HBV CORE IGM SER QL: ABNORMAL
HBV SURFACE AG SER QL: ABNORMAL
HCT VFR BLD AUTO: 24.9 % (ref 37–47)
HCV AB SER QL: REACTIVE
HGB BLD-MCNC: 8.6 G/DL (ref 12–16)
LACTATE SERPL-SCNC: 1.5 MMOL/L (ref 0.5–2)
MCH RBC QN AUTO: 27.9 PG (ref 27–33)
MCHC RBC AUTO-ENTMCNC: 34.5 G/DL (ref 32.2–35.5)
MCV RBC AUTO: 80.8 FL (ref 81.4–97.8)
PLATELET # BLD AUTO: 76 K/UL (ref 164–446)
PLATELETS.RETICULATED NFR BLD AUTO: 2.9 % (ref 0.6–13.1)
PMV BLD AUTO: 12.6 FL (ref 9–12.9)
POTASSIUM SERPL-SCNC: 4.1 MMOL/L (ref 3.6–5.5)
PROCALCITONIN SERPL-MCNC: 0.8 NG/ML
PROT SERPL-MCNC: 6 G/DL (ref 6–8.2)
RBC # BLD AUTO: 3.08 M/UL (ref 4.2–5.4)
RSV RNA SPEC QL NAA+PROBE: NEGATIVE
SARS-COV-2 RNA RESP QL NAA+PROBE: NOTDETECTED
SCCMEC + MECA PNL NOSE NAA+PROBE: NEGATIVE
SIGNIFICANT IND 70042: NORMAL
SITE SITE: NORMAL
SODIUM SERPL-SCNC: 129 MMOL/L (ref 135–145)
SOURCE SOURCE: NORMAL
SPECIMEN SOURCE: NORMAL
WBC # BLD AUTO: 12.3 K/UL (ref 4.8–10.8)

## 2024-05-17 PROCEDURE — 700102 HCHG RX REV CODE 250 W/ 637 OVERRIDE(OP): Performed by: STUDENT IN AN ORGANIZED HEALTH CARE EDUCATION/TRAINING PROGRAM

## 2024-05-17 PROCEDURE — 96366 THER/PROPH/DIAG IV INF ADDON: CPT

## 2024-05-17 PROCEDURE — A9270 NON-COVERED ITEM OR SERVICE: HCPCS | Performed by: STUDENT IN AN ORGANIZED HEALTH CARE EDUCATION/TRAINING PROGRAM

## 2024-05-17 PROCEDURE — 85027 COMPLETE CBC AUTOMATED: CPT

## 2024-05-17 PROCEDURE — 36415 COLL VENOUS BLD VENIPUNCTURE: CPT

## 2024-05-17 PROCEDURE — 87184 SC STD DISK METHOD PER PLATE: CPT

## 2024-05-17 PROCEDURE — 700111 HCHG RX REV CODE 636 W/ 250 OVERRIDE (IP): Mod: JZ | Performed by: STUDENT IN AN ORGANIZED HEALTH CARE EDUCATION/TRAINING PROGRAM

## 2024-05-17 PROCEDURE — 87641 MR-STAPH DNA AMP PROBE: CPT

## 2024-05-17 PROCEDURE — 87181 SC STD AGAR DILUTION PER AGT: CPT

## 2024-05-17 PROCEDURE — 87077 CULTURE AEROBIC IDENTIFY: CPT

## 2024-05-17 PROCEDURE — 99232 SBSQ HOSP IP/OBS MODERATE 35: CPT | Mod: GC | Performed by: INTERNAL MEDICINE

## 2024-05-17 PROCEDURE — 700102 HCHG RX REV CODE 250 W/ 637 OVERRIDE(OP)

## 2024-05-17 PROCEDURE — 85055 RETICULATED PLATELET ASSAY: CPT

## 2024-05-17 PROCEDURE — 87205 SMEAR GRAM STAIN: CPT

## 2024-05-17 PROCEDURE — 0241U HCHG SARS-COV-2 COVID-19 NFCT DS RESP RNA 4 TRGT MIC: CPT

## 2024-05-17 PROCEDURE — 770020 HCHG ROOM/CARE - TELE (206)

## 2024-05-17 PROCEDURE — 700105 HCHG RX REV CODE 258: Performed by: STUDENT IN AN ORGANIZED HEALTH CARE EDUCATION/TRAINING PROGRAM

## 2024-05-17 PROCEDURE — A9270 NON-COVERED ITEM OR SERVICE: HCPCS

## 2024-05-17 PROCEDURE — 97165 OT EVAL LOW COMPLEX 30 MIN: CPT

## 2024-05-17 PROCEDURE — 97162 PT EVAL MOD COMPLEX 30 MIN: CPT

## 2024-05-17 PROCEDURE — 87070 CULTURE OTHR SPECIMN AEROBIC: CPT

## 2024-05-17 PROCEDURE — 80053 COMPREHEN METABOLIC PANEL: CPT

## 2024-05-17 PROCEDURE — 83605 ASSAY OF LACTIC ACID: CPT

## 2024-05-17 RX ORDER — OMEPRAZOLE 20 MG/1
40 CAPSULE, DELAYED RELEASE ORAL DAILY
Status: DISCONTINUED | OUTPATIENT
Start: 2024-05-18 | End: 2024-05-25 | Stop reason: HOSPADM

## 2024-05-17 RX ORDER — GABAPENTIN 100 MG/1
200 CAPSULE ORAL 2 TIMES DAILY
Status: DISCONTINUED | OUTPATIENT
Start: 2024-05-17 | End: 2024-05-20

## 2024-05-17 RX ADMIN — OXYCODONE HYDROCHLORIDE 10 MG: 10 TABLET ORAL at 02:12

## 2024-05-17 RX ADMIN — ATORVASTATIN CALCIUM 20 MG: 20 TABLET, FILM COATED ORAL at 21:43

## 2024-05-17 RX ADMIN — PIPERACILLIN AND TAZOBACTAM 4.5 G: 4; .5 INJECTION, POWDER, FOR SOLUTION INTRAVENOUS at 21:43

## 2024-05-17 RX ADMIN — ACETAMINOPHEN 650 MG: 325 TABLET, FILM COATED ORAL at 22:37

## 2024-05-17 RX ADMIN — GABAPENTIN 400 MG: 400 CAPSULE ORAL at 05:34

## 2024-05-17 RX ADMIN — LACTULOSE 15 ML: 20 SOLUTION ORAL at 05:35

## 2024-05-17 RX ADMIN — LINEZOLID 600 MG: 600 INJECTION, SOLUTION INTRAVENOUS at 06:26

## 2024-05-17 RX ADMIN — GABAPENTIN 200 MG: 100 CAPSULE ORAL at 18:09

## 2024-05-17 RX ADMIN — OXYCODONE HYDROCHLORIDE 10 MG: 10 TABLET ORAL at 08:44

## 2024-05-17 RX ADMIN — PIPERACILLIN AND TAZOBACTAM 4.5 G: 4; .5 INJECTION, POWDER, FOR SOLUTION INTRAVENOUS at 02:01

## 2024-05-17 RX ADMIN — SODIUM CHLORIDE: 9 INJECTION, SOLUTION INTRAVENOUS at 00:44

## 2024-05-17 RX ADMIN — PIPERACILLIN AND TAZOBACTAM 4.5 G: 4; .5 INJECTION, POWDER, FOR SOLUTION INTRAVENOUS at 13:51

## 2024-05-17 RX ADMIN — OMEPRAZOLE 20 MG: 20 CAPSULE, DELAYED RELEASE ORAL at 05:34

## 2024-05-17 RX ADMIN — OXYCODONE HYDROCHLORIDE 10 MG: 10 TABLET ORAL at 18:14

## 2024-05-17 SDOH — ECONOMIC STABILITY: TRANSPORTATION INSECURITY
IN THE PAST 12 MONTHS, HAS LACK OF RELIABLE TRANSPORTATION KEPT YOU FROM MEDICAL APPOINTMENTS, MEETINGS, WORK OR FROM GETTING THINGS NEEDED FOR DAILY LIVING?: NO

## 2024-05-17 ASSESSMENT — COGNITIVE AND FUNCTIONAL STATUS - GENERAL
DAILY ACTIVITIY SCORE: 21
SUGGESTED CMS G CODE MODIFIER DAILY ACTIVITY: CJ
SUGGESTED CMS G CODE MODIFIER MOBILITY: CK
SUGGESTED CMS G CODE MODIFIER DAILY ACTIVITY: CJ
SUGGESTED CMS G CODE MODIFIER MOBILITY: CK
WALKING IN HOSPITAL ROOM: A LOT
STANDING UP FROM CHAIR USING ARMS: A LITTLE
MOVING FROM LYING ON BACK TO SITTING ON SIDE OF FLAT BED: A LITTLE
MOVING FROM LYING ON BACK TO SITTING ON SIDE OF FLAT BED: A LITTLE
DAILY ACTIVITIY SCORE: 21
SUGGESTED CMS G CODE MODIFIER DAILY ACTIVITY: CJ
STANDING UP FROM CHAIR USING ARMS: A LITTLE
DAILY ACTIVITIY SCORE: 21
CLIMB 3 TO 5 STEPS WITH RAILING: TOTAL
MOBILITY SCORE: 15
WALKING IN HOSPITAL ROOM: A LOT
TOILETING: A LITTLE
TOILETING: A LITTLE
MOVING TO AND FROM BED TO CHAIR: A LITTLE
DRESSING REGULAR LOWER BODY CLOTHING: A LITTLE
HELP NEEDED FOR BATHING: A LITTLE
CLIMB 3 TO 5 STEPS WITH RAILING: TOTAL
TOILETING: A LITTLE
HELP NEEDED FOR BATHING: A LITTLE
CLIMB 3 TO 5 STEPS WITH RAILING: TOTAL
DRESSING REGULAR LOWER BODY CLOTHING: A LITTLE
SUGGESTED CMS G CODE MODIFIER MOBILITY: CK
MOVING FROM LYING ON BACK TO SITTING ON SIDE OF FLAT BED: A LITTLE
MOBILITY SCORE: 16
HELP NEEDED FOR BATHING: A LITTLE
DRESSING REGULAR LOWER BODY CLOTHING: A LITTLE
MOBILITY SCORE: 16
MOVING TO AND FROM BED TO CHAIR: A LITTLE
STANDING UP FROM CHAIR USING ARMS: A LITTLE
MOVING TO AND FROM BED TO CHAIR: A LITTLE
WALKING IN HOSPITAL ROOM: TOTAL

## 2024-05-17 ASSESSMENT — SOCIAL DETERMINANTS OF HEALTH (SDOH)
WITHIN THE PAST 12 MONTHS, YOU WORRIED THAT YOUR FOOD WOULD RUN OUT BEFORE YOU GOT THE MONEY TO BUY MORE: NEVER TRUE
WITHIN THE PAST 12 MONTHS, THE FOOD YOU BOUGHT JUST DIDN'T LAST AND YOU DIDN'T HAVE MONEY TO GET MORE: NEVER TRUE
WITHIN THE LAST YEAR, HAVE YOU BEEN HUMILIATED OR EMOTIONALLY ABUSED IN OTHER WAYS BY YOUR PARTNER OR EX-PARTNER?: NO
IN THE PAST 12 MONTHS, HAS THE ELECTRIC, GAS, OIL, OR WATER COMPANY THREATENED TO SHUT OFF SERVICE IN YOUR HOME?: NO
WITHIN THE LAST YEAR, HAVE YOU BEEN AFRAID OF YOUR PARTNER OR EX-PARTNER?: NO
WITHIN THE LAST YEAR, HAVE TO BEEN RAPED OR FORCED TO HAVE ANY KIND OF SEXUAL ACTIVITY BY YOUR PARTNER OR EX-PARTNER?: NO
WITHIN THE LAST YEAR, HAVE YOU BEEN KICKED, HIT, SLAPPED, OR OTHERWISE PHYSICALLY HURT BY YOUR PARTNER OR EX-PARTNER?: NO

## 2024-05-17 ASSESSMENT — PATIENT HEALTH QUESTIONNAIRE - PHQ9
2. FEELING DOWN, DEPRESSED, IRRITABLE, OR HOPELESS: NOT AT ALL
SUM OF ALL RESPONSES TO PHQ9 QUESTIONS 1 AND 2: 0
1. LITTLE INTEREST OR PLEASURE IN DOING THINGS: NOT AT ALL
SUM OF ALL RESPONSES TO PHQ9 QUESTIONS 1 AND 2: 0
2. FEELING DOWN, DEPRESSED, IRRITABLE, OR HOPELESS: NOT AT ALL
1. LITTLE INTEREST OR PLEASURE IN DOING THINGS: NOT AT ALL

## 2024-05-17 ASSESSMENT — LIFESTYLE VARIABLES
AVERAGE NUMBER OF DAYS PER WEEK YOU HAVE A DRINK CONTAINING ALCOHOL: 0
ON A TYPICAL DAY WHEN YOU DRINK ALCOHOL HOW MANY DRINKS DO YOU HAVE: 0
DOES PATIENT WANT TO STOP DRINKING: NO
HOW MANY TIMES IN THE PAST YEAR HAVE YOU HAD 5 OR MORE DRINKS IN A DAY: 0
HAVE YOU EVER FELT YOU SHOULD CUT DOWN ON YOUR DRINKING: NO
ALCOHOL_USE: NO
TOTAL SCORE: 0
CONSUMPTION TOTAL: NEGATIVE
EVER FELT BAD OR GUILTY ABOUT YOUR DRINKING: NO
TOTAL SCORE: 0
TOTAL SCORE: 0
EVER HAD A DRINK FIRST THING IN THE MORNING TO STEADY YOUR NERVES TO GET RID OF A HANGOVER: NO
HAVE PEOPLE ANNOYED YOU BY CRITICIZING YOUR DRINKING: NO

## 2024-05-17 ASSESSMENT — ENCOUNTER SYMPTOMS
DIARRHEA: 0
CONSTIPATION: 0
HEMOPTYSIS: 0
NAUSEA: 1
COUGH: 1
WHEEZING: 1
CHILLS: 1
ABDOMINAL PAIN: 1
PALPITATIONS: 0
PND: 1
ORTHOPNEA: 0
MYALGIAS: 1
VOMITING: 1
BLOOD IN STOOL: 0
FEVER: 1
SHORTNESS OF BREATH: 1
NEUROLOGICAL NEGATIVE: 1
SPUTUM PRODUCTION: 1

## 2024-05-17 ASSESSMENT — PAIN DESCRIPTION - PAIN TYPE
TYPE: ACUTE PAIN;CHRONIC PAIN
TYPE: ACUTE PAIN
TYPE: ACUTE PAIN
TYPE: ACUTE PAIN;CHRONIC PAIN
TYPE: ACUTE PAIN;CHRONIC PAIN

## 2024-05-17 ASSESSMENT — GAIT ASSESSMENTS: GAIT LEVEL OF ASSIST: UNABLE TO PARTICIPATE

## 2024-05-17 ASSESSMENT — ACTIVITIES OF DAILY LIVING (ADL): TOILETING: INDEPENDENT

## 2024-05-17 ASSESSMENT — FIBROSIS 4 INDEX: FIB4 SCORE: 6.09

## 2024-05-17 NOTE — ED TRIAGE NOTES
Chief Complaint   Patient presents with    Vaginal Bleeding     Pt states she was discharged from the hospital Monday and started having vaginal bleeding that hasn't stopped       Pt is poor historian with scatter medical complaints, pt states she feels like her heart is racing, fever, chills, heavy vaginal bleeding, and abdominal cramping pain since Monday when she was released. Pt is in wheelchair at all times d/t BKA on the left side. Pt is ax0x4, very hard of hearing. Charge nurse notified of vital signs, EKG completed in triage, patient to lab area for draw. Pt educated on triage process and wait times.     BP (!) 132/113   Pulse (!) 125   Temp (!) 39.9 °C (103.8 °F) (Oral)   Resp 20   LMP 06/02/2008   SpO2 94%

## 2024-05-17 NOTE — ASSESSMENT & PLAN NOTE
CHILD class B.   Patient has a positive HCV antibody but negative RNA/NAAT - resolved infection.   Recent hospitalization for cirrhosis decompensation with anasarca. No paracentesis needed. No hx of GIB/EV.   No concern for acute decompensation, no ascites/minimal seen in CT this time.   - Started lactulose 5/21 with improvement in mentation  - Holding aldactone and lasix   - Monitor I and Os strictly

## 2024-05-17 NOTE — ED NOTES
Pt resting, airway patent, rr even and unlabored, equal chest rise and fall. NAD noted. Pt medicated per MAR. Pt repositioned in bed. Call light within reach.

## 2024-05-17 NOTE — ASSESSMENT & PLAN NOTE
RESOVLED  Likely in the setting of UTI. Confirmed not vaginal bleeding - hx of histerecetomy. Apixaban was held however continued during inpatient stay. Patient tolerating well, without hematuria.

## 2024-05-17 NOTE — ED NOTES
4 Eyes Skin Assessment Completed by Yesi RN    Head WDL  Ears WDL  Nose WDL  Mouth WDL  Neck WDL  Breast/Chest WDL  Shoulder Blades WDL  Spine WDL  (R) Arm/Elbow/Hand bruising  (L) Arm/Elbow/Hand Bruising, edema  Abdomen scar  Groin WDL  Scrotum/Coccyx/Buttocks Redness and Blanching  (R) Leg WDL  (L) Leg WDL  (R) Heel/Foot/Toe Redness and Blanching  (L) Heel/Foot/Toe L BKA          Devices In Places ECG, Blood Pressure Cuff, and Pulse Ox      Interventions In Place Gray Ear Foams    Possible Skin Injury No    Pictures Uploaded Into Epic N/A  Wound Consult Placed N/A  RN Wound Prevention Protocol Ordered No

## 2024-05-17 NOTE — THERAPY
Occupational Therapy   Initial Evaluation     Patient Name: April Alicia  Age:  64 y.o., Sex:  female  Medical Record #: [id number]  Today's Date: 5/17/2024     Precautions  Precautions: Fall Risk  Comments: L AKA    Assessment    Patient is 64 y.o. female admitted with sepsis, hospital acquired PNA, hematuria, N/V, hyponatremia, and ELIZABETH. Other pertinent medical history includes anemia, DM, alcoholic cirrhosis, a-fib, L BKA, and recent hospitalization for anasarca and SOB. Pt seen for OT evaluation. Pt required SBA for bed mobility, CGA for ADL transfer, and min A to don underwear/pants. Pt lives with her daughter and has a SO who can assist as needed when available. Pt current functional performance limited by impaired activity tolerance, generalized weakness, and impaired balance. Pt will benefit from skilled OT while admitted to acute care.     Plan    Occupational Therapy Initial Treatment Plan   Treatment Interventions: Self Care / Activities of Daily Living, Adaptive Equipment, Neuro Re-Education / Balance, Therapeutic Exercises, Therapeutic Activity  Treatment Frequency: 3 Times per Week  Duration: Until Therapy Goals Met    DC Equipment Recommendations: Bed Side Commode  Discharge Recommendations: Recommend outpatient occupational therapy services to address higher level deficits (was receiving OP therapy prior per chart)      Objective     05/17/24 1531   Initial Contact Note    Initial Contact Note Order Received and Verified, Occupational Therapy Evaluation in Progress with Full Report to Follow.   Prior Living Situation   Prior Services Intermittent Physical Support for ADL Per Family   Housing / Facility 1 Story House   Steps Into Home 2   Steps In Home 0   Bathroom Set up Walk In Shower;Shower Chair   Equipment Owned Front-Wheel Walker;4-Wheel Walker;Single Point Cane;Wheelchair;Tub / Shower Seat   Lives with - Patient's Self Care Capacity Adult Children   Comments Pt lives with her daughter  who assists as needed. Pt's spouse also able to assist.   Prior Level of ADL Function   Self Feeding Independent   Grooming / Hygiene Independent   Bathing Requires Assist   Dressing Independent   Toileting Independent   Prior Level of IADL Function   Medication Management Requires Assist   Laundry Requires Assist   Kitchen Mobility Requires Assist   Finances Requires Assist   Home Management Requires Assist   Shopping Requires Assist   Prior Level Of Mobility Uses Wheel Chair for in Home Mobility   Driving / Transportation Relatives / Others Provide Transportation   Precautions   Precautions Fall Risk   Pain   Pain Scales 0 to 10 Scale    Pain 0 - 10 Group   Therapist Pain Assessment Post Activity Pain Same as Prior to Activity;Nurse Notified  (not rated, agreeable to session)   Non Verbal Descriptors   Non Verbal Scale  Calm   Cognition    Cognition / Consciousness WDL   Speech/ Communication Hard of Hearing   Level of Consciousness Alert   Comments Very pleasant and cooperative, motivated to work with therapy and get stronger   Passive ROM Upper Body   Passive ROM Upper Body WDL   Active ROM Upper Body   Active ROM Upper Body  WDL   Sensation Upper Body   Upper Extremity Sensation  WDL   Upper Body Muscle Tone   Upper Body Muscle Tone  WDL   Coordination Upper Body   Coordination WDL   Balance Assessment   Sitting Balance (Static) Good   Sitting Balance (Dynamic) Fair +   Standing Balance (Static) Fair   Standing Balance (Dynamic) Fair -   Weight Shift Sitting Fair   Weight Shift Standing Absent   Comments trial w/ FWW   Bed Mobility    Supine to Sit Supervised   Sit to Supine Supervised   Scooting Supervised   Rolling Supervised   Comments HOB flat, no use of rails, relied on momentum to get up   ADL Assessment   Eating Supervision   Grooming Supervision;Seated  (washed hands/face)   Lower Body Dressing Minimal Assist  (don underwear, don pants)   Toileting   (declined the need)   Functional Mobility   Sit to  "Stand Contact Guard Assist   Bed, Chair, Wheelchair Transfer Contact Guard Assist   Transfer Method Stand Pivot   Mobility EOB>chair   Comments w/ FWW   Activity Tolerance   Sitting in Chair up to chair post, encouraged pt to stay up for meal   Sitting Edge of Bed >5 min   Standing <1 min total   Comments limited by weakness   Patient / Family Goals   Patient / Family Goal #1 \"I want to go walking with my family again\"   Short Term Goals   Short Term Goal # 1 Pt will perform ADL transfer w/ supv   Short Term Goal # 2 Pt will perform LB dressing w/ supv   Short Term Goal # 3 Pt will perform toilet hygiene w/ supv   Education Group   Education Provided Role of Occupational Therapist;Activities of Daily Living   Role of Occupational Therapist Patient Response Patient;Acceptance;Explanation;Verbal Demonstration   ADL Patient Response Patient;Acceptance;Explanation;Demonstration;Verbal Demonstration;Action Demonstration   Occupational Therapy Initial Treatment Plan    Treatment Interventions Self Care / Activities of Daily Living;Adaptive Equipment;Neuro Re-Education / Balance;Therapeutic Exercises;Therapeutic Activity   Treatment Frequency 3 Times per Week   Duration Until Therapy Goals Met   Problem List   Problem List Decreased Active Daily Living Skills;Decreased Homemaking Skills;Decreased Functional Mobility;Decreased Activity Tolerance;Impaired Postural Control / Balance                 "

## 2024-05-17 NOTE — ASSESSMENT & PLAN NOTE
Likely hypovolemic - secondary to Lasix and her diuretics, also poor PO intake.  - Encouraging PO intake, dc fluids  - CTM; slowly improving

## 2024-05-17 NOTE — RESPIRATORY CARE
COPD EDUCATION by COPD CLINICAL EDUCATOR  5/17/2024 at 7:39 AM by Diane Garcia RRT     Patient reviewed by COPD education team. Patient does not have a history or diagnosis of COPD and is a non-smoker.  Therefore, patient does not qualify for the COPD program.

## 2024-05-17 NOTE — PROGRESS NOTES
[facility] Internal Medicine Daily Progress Note    Date of Service  5/17/2024    [facility] Team: R IM Blue Team   Attending: Maryanne Phillips M.d.  Senior Resident: Dr. Mathias   Intern:  Dr. Hernandez  Contact Number: [phone]    Chief Complaint  April Alicia is a 64 y.o. female admitted 5/16/2024 with vaginal bleeding.     Hospital Course  April Alicia is a 64 y.o. 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 She presented 5/16/2024 with fevers, chills, hematuria, and shortness of breath. In the ER, she was tachycardic, febrile, requiring 1-2 lt O2. CT imaging showed a left lower lobe consolidation suggestive of pneumonia and a small pleural effusion. CT abdomen without significant ascites, had bladder wall thickening without kidney abnormalities. Had a leukocytosis > 14,000, sodium 131, creatinine 1.42, lactic acid of 2.6. urinalysis with significant hematuria and inflammatory changes. Admitted for sepsis due to HAP and UTI. Started on Linezolid and Zosyn. Urine and blood cultures in process.     Interval Problem Update  Still tachycardic in the 110s, improved BP   Patient complains of diffuse abdominal pain mostly suprapubic,states hematuria is resolving, denies vaginal bleeding, no fever/chills today, persists with SOB and productive cough.   Negative viral panel  Pending MRSA screening and blood/urine cultures     I have discussed this patient's plan of care and discharge plan at IDT rounds today with Case Management, Nursing, Nursing leadership, and other members of the IDT team.    Consultants/Specialty  None     Code Status  Full Code    Disposition  The patient is not medically cleared for discharge to home or a  post-acute facility.      I have placed the appropriate orders for post-discharge needs.    Review of Systems  Review of Systems   Constitutional:  Positive for chills and fever.   HENT:  Positive for congestion.    Respiratory:  Positive for cough, sputum production, shortness of breath and wheezing. Negative for hemoptysis.    Cardiovascular:  Positive for leg swelling and PND. Negative for chest pain, palpitations and orthopnea.   Gastrointestinal:  Positive for abdominal pain, nausea and vomiting. Negative for blood in stool, constipation, diarrhea and melena.   Musculoskeletal:  Positive for myalgias.   Skin:  Negative for rash.   Neurological: Negative.         Physical Exam  Temp:  [36.3 °C (97.3 °F)-39.9 °C (103.8 °F)] 36.3 °C (97.3 °F)  Pulse:  [] 89  Resp:  [13-23] 18  BP: ()/() 103/63  SpO2:  [87 %-99 %] 95 %    Physical Exam  Vitals and nursing note reviewed.   Constitutional:       General: She is not in acute distress.     Appearance: She is obese. She is not ill-appearing.   HENT:      Head: Normocephalic and atraumatic.      Mouth/Throat:      Mouth: Mucous membranes are moist.   Eyes:      General: No scleral icterus.     Extraocular Movements: Extraocular movements intact.      Conjunctiva/sclera: Conjunctivae normal.   Cardiovascular:      Rate and Rhythm: Normal rate. Rhythm irregular.      Heart sounds: Murmur (1/6 in mitral point - systolic) heard.   Pulmonary:      Effort: Pulmonary effort is normal.      Breath sounds: Normal breath sounds.   Abdominal:      General: Bowel sounds are normal. There is distension (Hard to evaluate for fluid waive - increased abdominal girth).      Palpations: Abdomen is soft.      Tenderness: There is abdominal tenderness (Epigastric and suprapubic). There is no guarding or rebound.   Genitourinary:     General: Normal vulva.      Vagina: No vaginal discharge (or bleeding).   Musculoskeletal:         General: Swelling (1+ pitting edema  symmetric) present. Normal range of motion.   Skin:     General: Skin is warm.      Capillary Refill: Capillary refill takes less than 2 seconds.      Coloration: Skin is not jaundiced.   Neurological:      General: No focal deficit present.      Mental Status: She is alert. Mental status is at baseline.   Psychiatric:         Mood and Affect: Mood normal.         Fluids    Intake/Output Summary (Last 24 hours) at 5/17/2024 1229  Last data filed at 5/17/2024 0800  Gross per 24 hour   Intake 1687.63 ml   Output 0 ml   Net 1687.63 ml       Laboratory  Recent Labs     05/16/24 1911 05/17/24  0303   WBC 14.2* 12.3*   RBC 3.64* 3.08*   HEMOGLOBIN 9.8* 8.6*   HEMATOCRIT 29.4* 24.9*   MCV 80.8* 80.8*   MCH 26.9* 27.9   MCHC 33.3 34.5   RDW 47.4 47.5   PLATELETCT 106* 76*   MPV 11.4 12.6     Recent Labs     05/16/24 1911 05/17/24  0303   SODIUM 131* 129*   POTASSIUM 4.1 4.1   CHLORIDE 100 100   CO2 18* 19*   GLUCOSE 161* 125*   BUN 28* 31*   CREATININE 1.42* 1.67*   CALCIUM 7.4* 6.7*                   Imaging  CT-ABDOMEN-PELVIS WITH   Final Result         1. New left lower lobe consolidation and small left pleural effusion. This is likely due to pneumonia.   2. Cirrhosis, portal hypertension, and splenomegaly.   3. Atherosclerosis including coronary artery disease.   4. Ascites.   5. Possible colitis of the cecum.      DX-CHEST-PORTABLE (1 VIEW)   Final Result      Hypoinflation and cardiomegaly. Mild edema cannot be excluded.           Assessment/Plan:    * Sepsis (HCC)- (present on admission)  Assessment & Plan  This is Sepsis Present on admission  SIRS criteria identified on my evaluation include: Fever, with temperature greater than 100.9 deg F, Tachycardia, with heart rate greater than 90 BPM, and Leukocytosis, with WBC greater than 12,000  Clinical indicators of end organ dysfunction include Lactic Acid greater than 2  Source is pulmonary and urinary.   Sepsis protocol initiated. Resuscitation volume of 2L ordered.  Reason that resuscitation volume of less than 30ml/kg was ordered concern for fluid overload  IV antibiotics as appropriate for source of sepsis - on Linezolid and Zosyn given concern for HAP and UTI - see individual plans   Urine and blood cultures pending     Hospital-acquired pneumonia  Assessment & Plan  Patient with recent discharge from the [facility] several days ago.  Now presenting with a Tmax of 103.6, tachycardia, leukocytosis, a retrocardiac consolidation and positive procalcitonin. Minimal o2 requirements.   CT imaging -- New left lower lobe consolidation and small left pleural effusion.   Has hx of pseudomonas in her prior infected joint (s/p BKA), not isolated in blood, urine  or sputum priorly. Hx of ESBL in urine.   Empirically covering with linezolid and Zosyn IV  - MRSA nares pending, if negative dc Linezolid   - On Zosyn also covering for urinary source   - Follow-up cultures  - O2 per protocol - encourage IS    Acute cystitis with hematuria- (present on admission)  Assessment & Plan  Patient states that she is noticed some blood in urine over the last several days, along with suprapubic pain. Bladder wall thickening in CT without kidney abnormalities.   Concern for UTI - hx of ESBL in urine x2 S to zosyn.   - Continue Zosyn for now   - Follow urine cultures     Acute kidney injury (HCC)- (present on admission)  Assessment & Plan  Creatinine level on admission was 1.42, BUN/Cr is 15 but stills suspect prerenal in the setting of sepsis and prior diuretic use, there is no evidence of cirrhosis decompensation at this time suggesting hepatorenal etiology.   - Holding home dose Lasix, Aldactone, ACE/ARB  - Stopped IVF, continue encouraging PO fluids as patient is tolerating well   - Monitor     Alcoholic cirrhosis of liver with ascites (HCC)- (present on admission)  Assessment & Plan  CHILD class B.   Patient has a positive HCV antibody but negative RNA/NAAT - resolved infection.   Recent  hospitalization for cirrhosis decompensation with anasarca. No paracentesis needed. No hx of GIB/EV.   No concern for acute decompensation, no ascites/minimal seen in CT this time.   - Monitor for lactulose needs  - Holding aldactone and lasix   - Monitor I and Os strictly    Acute hypoxemic respiratory failure (HCC)  Assessment & Plan  Secondary to pneumonia. Stable O2 needs, minimal.   - Encourage IS   - See plan for HAP     AF (atrial fibrillation) (HCC)  Assessment & Plan  Rate controlled.   - Continue home dose metoprolol.    - Holding apixaban in the setting of hematuria      Hematuria  Assessment & Plan  Likely in the setting of UTI. Confirmed not vaginal bleeding - hx of histerecetomy.   - Holding home dose apixaban for today and reevaluate as infections clears   - If no resolution will need outpt urology    Hyperglycemia  Assessment & Plan  Per chart review, it was noted the patient has a history of type 2 diabetes, but patient is now at prediabetes range.   - CTM fasting BG     Nausea & vomiting- (present on admission)  Assessment & Plan  IV and PO antiemetics     Lactic acid acidosis- (present on admission)  Assessment & Plan  Due to sepsis. Resolved.     Anemia of chronic disease- (present on admission)  Assessment & Plan  Prior iron studies was suggestive of iron deficiency anemia   - Monitor BM for melena - describes possible hx of melena  - Recheck anemia w/u    Hyponatremia- (present on admission)  Assessment & Plan  Likely hypovolemic - secondary to Lasix and her diuretics, also poor PO intake.  - Encouraging PO intake, dc fluids  - CTM         VTE prophylaxis: therapeutic anticoagulation with apixaban - held for hematuria    I have performed a physical exam and reviewed and updated ROS and Plan today (5/17/2024). In review of yesterday's note (5/16/2024), there are no changes except as documented above.

## 2024-05-17 NOTE — THERAPY
Physical Therapy   Initial Evaluation     Patient Name: April Alicia  Age:  64 y.o., Sex:  female  Medical Record #: [id number]  Today's Date: 5/17/2024     Precautions  Precautions: Fall Risk  Comments: L AKA    Assessment  Patient is a 64 y.o. female with hx of anemia, DM, alcoholic cirrhosis with ascites, A fib, L BKA, recent hospitalization for anasarca and SOB with DC 5/14/24. Pt now admitted with sepsis, hospital acquired PNA, hematuria, N/V, hyponatremia, and ELIZABETH. PT eval complete, pt currently presents near her functional baseline with minor impairment of strength, balance, and activity tolerance. Pt is at CGA-SBA level for bed mobility and transfers, however pt reports she is moving slower than usual and feels weak. Anticipate that pt will progress while admitted and be able to safely DC home with no further PT needs once medically cleared. Will follow while admitted for skilled PT intervention.     Plan    Physical Therapy Initial Treatment Plan   Treatment Plan : Bed Mobility, Neuro Re-Education / Balance, Self Care / Home Evaluation, Therapeutic Activities, Therapeutic Exercise, Stair Training  Treatment Frequency: 3 Times per Week  Duration: Until Therapy Goals Met    DC Equipment Recommendations: None  Discharge Recommendations: Anticipate that the patient will have no further physical therapy needs after discharge from the hospital (may benefit from outpatient PT once she gets her prosthesis)         Vitals   O2 (LPM) 2   O2 Delivery Device Silicone Nasal Cannula   Pain 0 - 10 Group   Therapist Pain Assessment   (no c/o pain)   Non Verbal Descriptors   Non Verbal Scale  Calm   Prior Living Situation   Prior Services Intermittent Physical Support for ADL Per Family   Housing / Facility 1 Story House   Steps Into Home 2   Steps In Home 0   Equipment Owned Front-Wheel Walker;Wheelchair;4-Wheel Walker;Single Point Cane   Lives with - Patient's Self Care Capacity Adult Children   Comments Reports  lives with her dtr who is able to assist intermittently when not working. Reports has an SO who can also provide intermittent assist.   Prior Level of Functional Mobility   Bed Mobility Independent   Transfer Status Independent   Ambulation Dependent   Wheelchair Independent   Stairs Dependent   Cognition    Cognition / Consciousness WDL   Speech/ Communication Hard of Hearing   Level of Consciousness Alert   Comments pleasnt and participatory, answers all questions appropriately   Active ROM Lower Body    Active ROM Lower Body  WDL   Comments RLE WFL   Strength Lower Body   Lower Body Strength  WDL   Comments RLE WFL   Coordination Upper Body   Coordination WDL   Balance Assessment   Sitting Balance (Static) Good   Sitting Balance (Dynamic) Fair +   Standing Balance (Static) Fair   Standing Balance (Dynamic) Fair -   Weight Shift Sitting Good   Weight Shift Standing Fair   Bed Mobility    Supine to Sit Supervised   Scooting Supervised   Gait Analysis   Gait Level Of Assist Unable to Participate   Functional Mobility   Bed, Chair, Wheelchair Transfer Contact Guard Assist   Transfer Method Stand Pivot   Mobility eob>chair   6 Clicks Assessment - How much HELP from from another person do you currently need... (If the patient hasn't done an activity recently, how much help from another person do you think he/she would need if he/she tried?)   Turning from your back to your side while in a flat bed without using bedrails? 4   Moving from lying on your back to sitting on the side of a flat bed without using bedrails? 3   Moving to and from a bed to a chair (including a wheelchair)? 3   Standing up from a chair using your arms (e.g., wheelchair, or bedside chair)? 3   Walking in hospital room? 1   Climbing 3-5 steps with a railing? 1   6 clicks Mobility Score 15   Short Term Goals    Short Term Goal # 1 pt will complete spt with spv in 6 tx for functional mobility.   Short Term Goal # 2 pt will propel self 150 ft in wc  with spv in 6 tx for household distances.   Education Group   Education Provided Role of Physical Therapist   Role of Physical Therapist Patient Response Patient;Acceptance;Explanation;Verbal Demonstration   Physical Therapy Initial Treatment Plan    Treatment Plan  Bed Mobility;Neuro Re-Education / Balance;Self Care / Home Evaluation;Therapeutic Activities;Therapeutic Exercise;Stair Training   Treatment Frequency 3 Times per Week   Duration Until Therapy Goals Met   Problem List    Problems Impaired Transfers;Impaired Balance;Decreased Activity Tolerance   Anticipated Discharge Equipment and Recommendations   DC Equipment Recommendations None   Discharge Recommendations Anticipate that the patient will have no further physical therapy needs after discharge from the hospital  (may benefit from outpatient PT once she gets her prosthesis)   Interdisciplinary Plan of Care Collaboration   IDT Collaboration with  Nursing   Patient Position at End of Therapy Seated;Chair Alarm On;Tray Table within Reach;Call Light within Reach;Phone within Reach   Collaboration Comments RN updated   Session Information   Date / Session Number  5/17- 1 (1/3, 5/23)

## 2024-05-17 NOTE — ED PROVIDER NOTES
ED Provider Note    CHIEF COMPLAINT  Chief Complaint   Patient presents with    Vaginal Bleeding     Pt states she was discharged from the hospital Monday and started having vaginal bleeding that hasn't stopped         EXTERNAL RECORDS REVIEWED  Inpatient Notes Patient admitted 05/12-05/14 for anasarca    HPI/ROS  LIMITATION TO HISTORY   Select: : None  OUTSIDE HISTORIAN(S):  Daughter    April Alicia is a 64 y.o. female who presents for evaluation of vaginal bleeding per triage note however it is actually blood in the urine which has been ongoing for the past couple days. She has a history of hysterectomy. She denies painful urination however she states it is dark. She developed a fever today. She also has some lower abdominal pain. She denies any nausea, vomiting, diarrhea. She has no chest pain or difficulty breathing. She was recently placed on Eliquis due to history of atrial fibrillation during this most recent hospitalization.    PAST MEDICAL HISTORY   has a past medical history of Abnormal finding of lung (12/27/2022), Acute exacerbation of chronic obstructive pulmonary disease (COPD) ([facility]) (01/27/2020), Alcoholic cirrhosis ([facility]), Arthritis, ASTHMA, Atrial fibrillation ([facility]), Dry eyes (11/16/2017), Edentulous, Emphysema of lung ([facility]), H/O: hysterectomy (12/05/2019), Heart burn, Hepatic encephalopathy ([facility]) (12/27/2022), Hepatitis C infection (07/28/2022), History of rheumatic fever (09/04/2017), Hypertension, 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[facility]), Medication overuse headache (08/15/2017), Mitral regurgitation, Pancytopenia ([facility]) (07/26/2022), Pneumonia (02/2020), Primary osteoarthritis of left knee (08/25/2020), Prosthetic joint infection ([facility]) (2018), Protein-calorie malnutrition, severe ([facility]) (08/01/2022), Right leg DVT ([facility]) (2018), Seizure disorder ([facility]), Septic arthritis of knee, left ([facility]) (07/19/2022), Septic arthritis of shoulder, left ([facility]) (07/17/2022), Septic shock due to Pseudomonas  species ([facility]) (10/30/2023), and Type 2 diabetes mellitus ([facility]) (2022).    SURGICAL HISTORY   has a past surgical history that includes gyn surgery (); gyn surgery (); colonoscopy with clipping (10/28/2015); colonoscopy with sclerotherapy (10/28/2015); colonoscopy with tattooing (10/28/2015); irrigation & debridement ortho (Right, 9/3/2017); knee arthroplasty total (Right, 2018); total knee arthroplasty (Left, 2020); irrigation & debridement general (Left, 2022); revise knee joint replace,all parts (Left, 2022); revise knee joint replace,all parts (Left, 2022); irrigation & debridement general (Left, 2022); total knee arthroplasty (Left, 10/30/2023); and knee amputation above (Left, 2024).    FAMILY HISTORY  Family History   Problem Relation Age of Onset    Diabetes Mother     Seizures Father     Heart Attack Father 45    Diabetes Brother     Alcohol abuse Brother     Dementia Brother     Diabetes Brother        SOCIAL HISTORY  Social History     Tobacco Use    Smoking status: Former     Current packs/day: 0.00     Types: Cigarettes     Quit date: 2016     Years since quittin.3    Smokeless tobacco: Former     Quit date: 2021    Tobacco comments:     a few a day when I can   Vaping Use    Vaping status: Never Used   Substance and Sexual Activity    Alcohol use: Not Currently     Comment: hx of AUD    Drug use: Not Currently    Sexual activity: Not Currently     Partners: Male     Birth control/protection: Post-Menopausal     Comment: Has boyfriend, not currently sexually active       CURRENT MEDICATIONS  Home Medications       Reviewed by Chaka Dang R.N. (Registered Nurse) on 24 at 1856  Med List Status: Partial     Medication Last Dose Status   apixaban (ELIQUIS) 5mg Tab  Active   atorvastatin (LIPITOR) 20 MG Tab  Active   ferrous sulfate 325 (65 Fe) MG tablet  Active   furosemide (LASIX) 40 MG Tab  Active   gabapentin (NEURONTIN) 300 MG Cap   Active   lactulose 20 GM/30ML Solution  Active   losartan (COZAAR) 100 MG Tab  Active   metoprolol SR (TOPROL XL) 25 MG TABLET SR 24 HR  Active   ondansetron (ZOFRAN ODT) 4 MG TABLET DISPERSIBLE  Active   oxyCODONE immediate release (ROXICODONE) 10 MG immediate release tablet  Active   pantoprazole (PROTONIX) 20 MG tablet  Active   SPIRIVA HANDIHALER 18 MCG Cap  Active   spironolactone (ALDACTONE) 25 MG Tab  Active                    ALLERGIES  No Known Allergies    PHYSICAL EXAM  VITAL SIGNS: BP (!) 132/113   Pulse (!) 125   Temp (!) 39.9 °C (103.8 °F) (Oral)   Resp 20   LMP 06/02/2008   SpO2 94%      Constitutional: Well developed, Well nourished, Mild distress 2/2 to symptoms  HEENT: Normocephalic, Atraumatic,  external ears normal, pharynx pink,  Mucous  Membranes moist, No rhinorrhea or mucosal edema  Eyes: PERRL, EOMI, Conjunctiva normal, No discharge.   Neck: Normal range of motion, No tenderness, Supple, No stridor.   Cardiovascular: Tachycardic, irregularly irregular rhythm, No murmurs,  rubs, or gallops.   Thorax & Lungs: Lungs clear to auscultation bilaterally, No respiratory distress, No wheezes, rhales or rhonchi, No chest wall tenderness.   Abdomen: Soft, mild lower abdominal tenderness to palpation, non distended,  No pulsatile masses., no rebound guarding or peritoneal signs.   Skin: Warm, Dry, No erythema, No rash,   Back:  No CVA tenderness,  No spinal tenderness, bony crepitance step offs or instability.   Extremities: Equal, intact distal pulses, No cyanosis, clubbing or edema,  No tenderness.   Musculoskeletal: Good range of motion in all major joints. No tenderness to palpation or major deformities noted.   Neurologic: Alert & oriented No focal deficits noted.  Psychiatric: Affect normal, Judgment normal, Mood normal.      EKG/LABS  Results for orders placed or performed during the hospital encounter of 05/16/24   CBC with Differential   Result Value Ref Range    WBC 14.2 (H) 4.8 - 10.8  K/uL    RBC 3.64 (L) 4.20 - 5.40 M/uL    Hemoglobin 9.8 (L) 12.0 - 16.0 g/dL    Hematocrit 29.4 (L) 37.0 - 47.0 %    MCV 80.8 (L) 81.4 - 97.8 fL    MCH 26.9 (L) 27.0 - 33.0 pg    MCHC 33.3 32.2 - 35.5 g/dL    RDW 47.4 35.9 - 50.0 fL    Platelet Count 106 (L) 164 - 446 K/uL    MPV 11.4 9.0 - 12.9 fL    Neutrophils-Polys 81.80 (H) 44.00 - 72.00 %    Lymphocytes 10.20 (L) 22.00 - 41.00 %    Monocytes 6.40 0.00 - 13.40 %    Eosinophils 0.10 0.00 - 6.90 %    Basophils 0.50 0.00 - 1.80 %    Immature Granulocytes 1.00 (H) 0.00 - 0.90 %    Nucleated RBC 0.00 0.00 - 0.20 /100 WBC    Neutrophils (Absolute) 11.58 (H) 1.82 - 7.42 K/uL    Lymphs (Absolute) 1.44 1.00 - 4.80 K/uL    Monos (Absolute) 0.91 (H) 0.00 - 0.85 K/uL    Eos (Absolute) 0.01 0.00 - 0.51 K/uL    Baso (Absolute) 0.07 0.00 - 0.12 K/uL    Immature Granulocytes (abs) 0.14 (H) 0.00 - 0.11 K/uL    NRBC (Absolute) 0.00 K/uL   Complete Metabolic Panel   Result Value Ref Range    Sodium 131 (L) 135 - 145 mmol/L    Potassium 4.1 3.6 - 5.5 mmol/L    Chloride 100 96 - 112 mmol/L    Co2 18 (L) 20 - 33 mmol/L    Anion Gap 13.0 7.0 - 16.0    Glucose 161 (H) 65 - 99 mg/dL    Bun 28 (H) 8 - 22 mg/dL    Creatinine 1.42 (H) 0.50 - 1.40 mg/dL    Calcium 7.4 (L) 8.5 - 10.5 mg/dL    Correct Calcium 8.7 8.5 - 10.5 mg/dL    AST(SGOT) 38 12 - 45 U/L    ALT(SGPT) 18 2 - 50 U/L    Alkaline Phosphatase 160 (H) 30 - 99 U/L    Total Bilirubin 2.0 (H) 0.1 - 1.5 mg/dL    Albumin 2.4 (L) 3.2 - 4.9 g/dL    Total Protein 7.0 6.0 - 8.2 g/dL    Globulin 4.6 (H) 1.9 - 3.5 g/dL    A-G Ratio 0.5 g/dL   Lipase   Result Value Ref Range    Lipase 16 11 - 82 U/L   Urinalysis    Specimen: Urine   Result Value Ref Range    Color Red (A)     Character Turbid (A)     Specific Gravity see comment <1.035    Ph see comment 5.0 - 8.0    Glucose see comment Negative mg/dL    Ketones see comment Negative mg/dL    Protein see comment Negative mg/dL    Bilirubin see comment Negative    Urobilinogen, Urine see  comment Negative    Nitrite see comment Negative    Leukocyte Esterase see comment Negative    Occult Blood see comment Negative    Micro Urine Req Microscopic    ESTIMATED GFR   Result Value Ref Range    GFR (CKD-EPI) 41 (A) >60 mL/min/1.73 m 2   Lactic Acid   Result Value Ref Range    Lactic Acid 2.6 (H) 0.5 - 2.0 mmol/L   Lactic Acid   Result Value Ref Range    Lactic Acid 1.8 0.5 - 2.0 mmol/L   Lactic Acid   Result Value Ref Range    Lactic Acid 1.5 0.5 - 2.0 mmol/L   URINE MICROSCOPIC (W/UA)   Result Value Ref Range    WBC 5-10 (A) /hpf    RBC >150 (A) /hpf    Bacteria Few (A) None /hpf    Epithelial Cells Moderate (A) /hpf    Hyaline Cast 0-2 /lpf   EKG   Result Value Ref Range    Report       [facility]    Test Date:  2024  Pt Name:    FRANCISCA TURNER          Department: ER  MRN:        [id number]                      Room:       [facility]  Gender:     Female                       Technician: [id number]  :        1960                   Requested By:ER TRIAGE PROTOCOL  Order #:    [id number]                    Reading MD: Karo Campo    Measurements  Intervals                                Axis  Rate:       125                          P:          0  AK:         0                            QRS:        18  QRSD:       75                           T:          35  QT:         343  QTc:        495    Interpretive Statements  Atrial fibrillation  Normal axis  Prolonged QT intervals  Nonspecific T wave flattening  Compared to ECG 2024 05:16:32  No significant changes    Electronically Signed On 2024 18:38:29 PDT by Karo Campo         I have independently interpreted this EKG    RADIOLOGY/PROCEDURES   I have independently interpreted the diagnostic imaging associated with this visit and am waiting the final reading from the radiologist.   My preliminary interpretation is as follows: no free air in abdomen    Radiologist  interpretation:  CT-ABDOMEN-PELVIS WITH   Final Result         1. New left lower lobe consolidation and small left pleural effusion. This is likely due to pneumonia.   2. Cirrhosis, portal hypertension, and splenomegaly.   3. Atherosclerosis including coronary artery disease.   4. Ascites.   5. Possible colitis of the cecum.      DX-CHEST-PORTABLE (1 VIEW)   Final Result      Hypoinflation and cardiomegaly. Mild edema cannot be excluded.          COURSE & MEDICAL DECISION MAKING    ASSESSMENT, COURSE AND PLAN  Care Narrative:   This is a 65 yo female who presents for evaluation of fever and blood in her urine. On arrival she is febrile, tachycardic but is not hypotensive. She is in atrial fibrillation. She is overall nontoxic in appearance. She has some mild lower abdominal tenderness however no evidence of peritonitis.     Labs were obtained and she has leukocytosis to 14,200. Hemoglobin actually improved to 9.8 from four days ago. She is thrombocytopenic with platelet count 106,000 likely 2/2 cirrhosis however this is largely unchanged. She does have an ELIZABETH with creatinine 1.4 (baseline approximately 0.7). Blood cultures x 2 obtained. She was given LR bolus x 1 L and I withheld the 30 cc/kg bolus due to her history of volume overload and she is not hypotensive. Initial lactate 2.6 however has normalized after IVF. CT abd obtained and is concerning for left sided pneumonia. She has no RUQ TTP to suggest biliary problem.     Patient was treated empirically with vancomycin and Zosyn given recent hospitalization. I do think her atrial fibrillation is 2/2 to her fever, sepsis, and underlying disease. I discussed with the hospitalist, Dr. Fontaine, regarding admission. I attempted to call her daughter to provide update however she did not answer. Patient is agreeable to admission plan with no further questions.     Hydration: Based on the patient's presentation of Sepsis the patient was given IV fluids. IV Hydration was  used because oral hydration was not adequate alone. Upon recheck following hydration, the patient was improved with tachycardia resolved.  Sepsis: Infection was suspected 2008 (Time). Sepsis pathway was initiated. Less than 30cc/kg because of concern for volume overload 1000 mL of crystalloid was ordered. Antibiotics were given per protocol.          ADDITIONAL PROBLEMS MANAGED  None    DISPOSITION AND DISCUSSIONS  I have discussed management of the patient with the following physicians and CANDIDO's:    Hospitalist - Dr. Fontaine    Discussion of management with other QHP or appropriate source(s): None     Escalation of care considered, and ultimately not performed: None    Barriers to care at this time, including but not limited to:  None .     Decision tools and prescription drugs considered including, but not limited to: Antibiotics vancomycin and zosyn .    FINAL DIAGNOSIS  1. Pneumonia of left lower lobe due to infectious organism    2. Sepsis with acute renal failure without septic shock, due to unspecified organism, unspecified acute renal failure type (HCC)    3. Hematuria, unspecified type    4. Atrial fibrillation, unspecified type (HCC)           Electronically signed by: Karo Campo M.D., 5/16/2024 8:07 PM

## 2024-05-17 NOTE — ED NOTES
Pt medicated per MAR. Pt resting, airway patent, rr even and unlabored, equal chest rise and fall. NAD noted.

## 2024-05-17 NOTE — DISCHARGE PLANNING
Case Management Discharge Planning    Admission Date: 5/16/2024  GMLOS: 5.1  ALOS: 1    6-Clicks ADL Score: 21  6-Clicks Mobility Score: 15  PT and/or OT Eval ordered: Yes  Post-acute Referrals Ordered: No  Post-acute Choice Obtained: NA  Has referral(s) been sent to post-acute provider:  [facility]      Anticipated Discharge Dispo: Discharge Disposition: Discharged to home/self care (01)    DME Needed: No    Action(s) Taken: Updated Provider/Nurse on Discharge Plan, Patient discussed during IDT rounds with medical team.    Escalations Completed: None    Medically Clear: No    Next Steps: Case Management will continue to follow for discharge planning needs.    Barriers to Discharge: Medical clearance    Is the patient up for discharge tomorrow: No      RN Case Manager met with patient at bedside and obtained the information used in this assessment. Patient verified accuracy of facesheet; patient lives in a single story home with her daughter.  Prior to current hospitalization, patient was independent with ADLS/IADLS. Patient has a ride and is able to attend necessary MD appointments. Patient's PCP is Anum Gatica and prefers to use [facility] on [address]. Patient has no financial concerns. Patient has support from her daughter. Denies any history of substance use and denies any diagnosis of mental illness.      Care Transition Team Assessment    Information Source: Patient  Orientation Level: Oriented X4  Information Given By: Patient  Who is responsible for making decisions for patient? : Patient    Readmission Evaluation  Is this a readmission?: Yes - unplanned readmission    Elopement Risk  Legal Hold: No  Ambulatory or Self Mobile in Wheelchair: No-Not an Elopement Risk  Elopement Risk: Not at Risk for Elopement    Interdisciplinary Discharge Planning  Does Admitting Nurse Feel This Could be a Complex Discharge?: No  Primary Care Physician: Anum Gatica  Lives with - Patient's Self Care Capacity: Adult  Children  Patient or legal guardian wants to designate a caregiver: No  Support Systems: Family Member(s)  Housing / Facility: 1 Story House  Name of Care Facility: n/a  Able to Return to Previous ADL's: Yes  Mobility Issues: No  Prior Services: Intermittent Physical Support for ADL Per Family  Patient Prefers to be Discharged to:: Home  Assistance Needed: No  Durable Medical Equipment: Home Oxygen (Wheelchair)    Discharge Preparedness  What is your plan after discharge?: Home with help  Prior Functional Level: Uses Wheelchair  Difficulity with ADLs: None  Difficulity with IADLs: Driving    Functional Assesment  Prior Functional Level: Uses Wheelchair    Finances  Financial Barriers to Discharge: No  Prescription Coverage: Yes    Vision / Hearing Impairment  Vision Impairment : Yes  Right Eye Vision: Wears Glasses  Left Eye Vision: Wears Glasses  Hearing Impairment : Yes  Hearing Impairment: Both Ears, Hearing Device Not Available  Does Pt Need Special Equipment for the Hearing Impaired?: No    Values / Beliefs / Concerns  Values / Beliefs Concerns : No    Advance Directive  Advance Directive?: POLST (Advance directive)    Domestic Abuse  Have you ever been the victim of abuse or violence?: No  Physical Abuse or Sexual Abuse: No  Verbal Abuse or Emotional Abuse: No  Possible Abuse/Neglect Reported to:: Not Applicable    Psychological Assessment  History of Substance Abuse: None  History of Psychiatric Problems: No    Discharge Risks or Barriers  Discharge risks or barriers?: No    Anticipated Discharge Information  Discharge Disposition: Discharged to home/self care (01)

## 2024-05-17 NOTE — PROGRESS NOTES
4 Eyes Skin Assessment Completed by DANYEL Arceo and DANYEL Domignuez.    Head WDL  Ears WDL  Nose WDL  Mouth WDL  Neck WDL  Breast/Chest WDL  Shoulder Blades WDL  Spine WDL  (R) Arm/Elbow/Hand WDL  (L) Arm/Elbow/Hand WDL  Abdomen WDL  Groin WDL  Scrotum/Coccyx/Buttocks WDL  (R) Leg WDL  (L) Leg BKA  (R) Heel/Foot/Toe WDL  (L) Heel/Foot/Toe BKA          Devices In Places Tele Box and Nasal Cannula      Interventions In Place Gray Ear Foams    Possible Skin Injury No    Pictures Uploaded Into Epic N/A  Wound Consult Placed N/A  RN Wound Prevention Protocol Ordered No

## 2024-05-17 NOTE — ASSESSMENT & PLAN NOTE
Rate controlled.   - Continue home dose metoprolol.    - Holding apixaban in the setting of hematuria

## 2024-05-17 NOTE — ED NOTES
Pt aox4, skin pink warm and dry, airway patent, rr even and unlabored. Vitals stable. Pt in NAD at this time with no new complaints. Transports with transport team in stable condition cardiac monitor and on 2lpm oxygen. Pt belongings sent with her.

## 2024-05-17 NOTE — ASSESSMENT & PLAN NOTE
Prior iron studies was suggestive of iron deficiency anemia   - Monitor BM for melena - describes possible hx of melena  - Recheck anemia w/u

## 2024-05-17 NOTE — ASSESSMENT & PLAN NOTE
Patient with recent discharge from the [facility] several days ago.  Now presenting with a Tmax of 103.6, tachycardia, leukocytosis, a retrocardiac consolidation and positive procalcitonin. Minimal o2 requirements.   CT imaging -- New left lower lobe consolidation and small left pleural effusion.   Has hx of pseudomonas in her prior infected joint (s/p BKA), not isolated in blood, urine  or sputum priorly. Hx of ESBL in urine.   Empirically covering with linezolid and Zosyn IV. Switched zosyn to C3, then switched to cefuroxime. Anticipted to complete abx on 5/45/24.

## 2024-05-17 NOTE — ED NOTES
Medication history reviewed with patients daughter via phone (Ava [phone]) .   Med rec is complete  Allergies reviewed.     Patient was prescribed a 5 day course of Macrobid 100mg bid on 4/17/24- this was completed per patient.     Anticoagulants: No    Demetrius Alcaraz

## 2024-05-17 NOTE — DIETARY
"Nutrition services: Day 1 of admit.  April Alicia is a 64 y.o. female with admitting DX of Sepsis     Consult received for Malnutrition Screening Tool: 2 (poor PO reported as well as 2-13 lb within 1 month)    RD able to visit pt at bedside. Pt states has had a poor appetite for the past week PTA. States appetite was better this morning. Mentions compared to usual intake, pt was consuming 50-75% of her normal baseline intake. Pt states is unsure of weight changes, suspects she may have had some. Mentions her stomach is feeling better today but has been distended. Pt mentions does enjoy cottage cheese with fruit and would enjoy receiving this once a day. Denies additional concerns or questions for RD.     Assessment:  Height: 170.2 cm (5' 7\")  Weight: 92.1 kg (203 lb)  Body mass index is 31.79 kg/m²., BMI classification: Obesity Class I   Diet/Intake: Regular     Evaluation:   Pertinent medical hx and presentation includes: sepsis, hospital-acquired pneumonia, AF, hyperglycemia, ELIZABETH, anemia of chronic disease, alcoholic cirrhosis of liver with ascites  Pt previously admitted from 5/12/24-5/14/24 for abdominal swelling. During this recent admission, pt was consuming >50% for meals provided per ADLs. This supports likelihood of meeting nutrition demands PTA. RD offered pt high protein snacks/ protein shakes, pt prefers cottage cheese with fruit.   Per chart review, weight hx with unknown measurement sources  Wt Readings from Last 4 Encounters:   05/17/24 92.1 kg (203 lb)   05/12/24 87.1 kg (192 lb 0.3 oz)   04/17/24 83.5 kg (184 lb)   01/11/24 85.3 kg (188 lb)   Potential uptrend in weight observed, though is measured via bed scale and has some variability and margin of error as a result. Weight gain likely as a result of worsening anasarca.   Per nutrition focused physical exam, pt appears nourished. No signs of subcutaneous fat or muscle wasting observed. Does has distended abdomen, would classify as mild " to moderate. This is not considered fluid retention in setting of malnutrition, but rather as a result of cirrhosis.   Skin: no current pressure injuries charted.  Pertinent labs: Na 129, glucose 125, BUN 31, creatinine 1.67, calcium correct 8.2, albumin 2.1  Pertinent meds: lipitor, neurontin, lactulose, linezolid, prilosec, continuous NS infusion   Last BM none charted yet this admission.     Malnutrition Risk: Pt does not meet criteria at this time in congruence with ASPEN/[employer]y guidelines.     Problem: Nutritional:  Goal: Achieve adequate nutritional intake    Recommendations/Plan:  Patient will consume >50% of meals  RD to provide high protein snack of cottage cheese and fruit   Encourage intake of meals  Document intake of all meals and/or supplements as % taken in ADL's to provide interdisciplinary communication across all shifts.   Monitor weight.  Kitchen staff available to update meal preferences upon request     RD following

## 2024-05-17 NOTE — CARE PLAN
The patient is Stable - Low risk of patient condition declining or worsening    Shift Goals  Clinical Goals: Monitor test results, monitor respiratory status, IV ABX  Patient Goals: rest, feel better  Family Goals: EDMUND    Progress made toward(s) clinical / shift goals:      Problem: Knowledge Deficit - Standard  Goal: Patient and family/care givers will demonstrate understanding of plan of care, disease process/condition, diagnostic tests and medications  Description: Target End Date:  1-3 days or as soon as patient condition allows    Document in Patient Education    1.  Patient and family/caregiver oriented to unit, equipment, visitation policy and means for communicating concern  2.  Complete/review Learning Assessment  3.  Assess knowledge level of disease process/condition, treatment plan, diagnostic tests and medications  4.  Explain disease process/condition, treatment plan, diagnostic tests and medications  Outcome: Progressing     Problem: Pain - Standard  Goal: Alleviation of pain or a reduction in pain to the patient’s comfort goal  Description: Target End Date:  Prior to discharge or change in level of care    Document on Vitals flowsheet    1.  Document pain using the appropriate pain scale per order or unit policy  2.  Educate and implement non-pharmacologic comfort measures (i.e. relaxation, distraction, massage, cold/heat therapy, etc.)  3.  Pain management medications as ordered  4.  Reassess pain after pain med administration per policy  5.  If opiods administered assess patient's response to pain medication is appropriate per POSS sedation scale  6.  Follow pain management plan developed in collaboration with patient and interdisciplinary team (including palliative care or pain specialists if applicable)  Outcome: Progressing     Problem: Skin Integrity  Goal: Skin integrity is maintained or improved  Description: Target End Date:  Prior to discharge or change in level of care    Document  interventions on Skin Risk/Sincere flowsheet groups and corresponding LDA    1.  Assess and monitor skin integrity, appearance and/or temperature  2.  Assess risk factors for impaired skin integrity and/or pressures ulcers  3.  Implement precautions to protect skin integrity in collaboration with interdisciplinary team  4.  Implement pressure ulcer prevention protocol if at risk for skin breakdown  5.  Confirm wound care consult if at risk for skin breakdown  6.  Ensure patient use of pressure relieving devices  (Low air loss bed, waffle overlay, heel protectors, ROHO cushion, etc)  Outcome: Progressing     Problem: Fall Risk  Goal: Patient will remain free from falls  Description: Target End Date:  Prior to discharge or change in level of care    Document interventions on the [facility] Fall Risk Assessment    1.  Assess for fall risk factors  2.  Implement fall precautions  Outcome: Progressing     Problem: Peripheral Vascular Perfusion:  Goal: Adequacy of tissue perfusion will improve  Outcome: Progressing  Goal: Will regain or maintain baseline level of consciousness  Outcome: Progressing     Problem: Fluid Volume:  Goal: Hemodynamic stability will improve  Outcome: Progressing  Goal: Risk for third space fluid shift will decrease  Outcome: Progressing     Problem: Respiratory:  Goal: Ability to maintain adequate ventilation will improve  Outcome: Progressing  Goal: Levels of oxygenation will improve  Outcome: Progressing     Problem: Physical Regulation:  Goal: Diagnostic test results will improve  Outcome: Progressing  Goal: Signs and symptoms of infection will decrease  Outcome: Progressing     Problem: Knowledge Deficit:  Goal: Patient's knowledge of the prescribed therapeutic regimen will improve  Outcome: Progressing       Patient is not progressing towards the following goals:

## 2024-05-17 NOTE — ED NOTES
Pt moved into hospital bed and hooked back up to cardiac monitor, blood pressure monitoring and pulse ox.

## 2024-05-17 NOTE — ASSESSMENT & PLAN NOTE
Patient states that she is noticed some blood in urine over the last several days, along with suprapubic pain. Bladder wall thickening in CT without kidney abnormalities.   Concern for UTI - hx of ESBL in urine x2 S to zosyn.   -Continue cefuroxime  - Follow urine cultures (grew S. aureus)

## 2024-05-17 NOTE — ASSESSMENT & PLAN NOTE
RESOLVED  This is Sepsis Present on admission  SIRS criteria identified on my evaluation include: Fever, with temperature greater than 100.9 deg F, Tachycardia, with heart rate greater than 90 BPM, and Leukocytosis, with WBC greater than 12,000  Clinical indicators of end organ dysfunction include Lactic Acid greater than 2  Source is pulmonary and urinary.   Sepsis protocol initiated. Resuscitation volume of 2L ordered. Reason that resuscitation volume of less than 30ml/kg was ordered concern for fluid overload  IV antibiotics as appropriate for source of sepsis - on Linezolid and Zosyn given concern for HAP and UTI - see individual plans   Urine cultures grew S. aureus; blood cultures grew S pneumonia

## 2024-05-17 NOTE — ASSESSMENT & PLAN NOTE
Creatinine level on admission was 1.42, BUN/Cr is 15 but stills suspect prerenal in the setting of sepsis and prior diuretic use, there is no evidence of cirrhosis decompensation at this time suggesting hepatorenal etiology.   - Holding home dose Lasix, Aldactone, ACE/ARB  - Stopped IVF, continue encouraging PO fluids as patient is tolerating well   - Monitor

## 2024-05-17 NOTE — PROGRESS NOTES
Critical value of blood culture results, positive for gram positive cocci, reported to RN from lab at 1205. MD Mathias notified with verbal confirmation at 1213. No new orders at this time.

## 2024-05-17 NOTE — ASSESSMENT & PLAN NOTE
Per chart review, it was noted the patient has a history of type 2 diabetes, but patient is now at prediabetes range.   - CTM fasting BG

## 2024-05-18 PROBLEM — R78.81 BACTEREMIA DUE TO STREPTOCOCCUS PNEUMONIAE: Status: ACTIVE | Noted: 2024-05-18

## 2024-05-18 PROBLEM — B95.3 BACTEREMIA DUE TO STREPTOCOCCUS PNEUMONIAE: Status: ACTIVE | Noted: 2024-05-18

## 2024-05-18 PROBLEM — D64.9 NORMOCYTIC ANEMIA: Status: ACTIVE | Noted: 2022-12-27

## 2024-05-18 LAB
ALBUMIN SERPL BCP-MCNC: 2 G/DL (ref 3.2–4.9)
ALBUMIN/GLOB SERPL: 0.5 G/DL
ALP SERPL-CCNC: 124 U/L (ref 30–99)
ALT SERPL-CCNC: 13 U/L (ref 2–50)
AMORPH CRY #/AREA URNS HPF: PRESENT /HPF
ANION GAP SERPL CALC-SCNC: 12 MMOL/L (ref 7–16)
ANION GAP SERPL CALC-SCNC: 4 MMOL/L (ref 7–16)
APPEARANCE UR: ABNORMAL
AST SERPL-CCNC: 18 U/L (ref 12–45)
BACTERIA #/AREA URNS HPF: ABNORMAL /HPF
BACTERIA UR CULT: ABNORMAL
BACTERIA UR CULT: ABNORMAL
BASOPHILS # BLD AUTO: 0.7 % (ref 0–1.8)
BASOPHILS # BLD: 0.05 K/UL (ref 0–0.12)
BILIRUB SERPL-MCNC: 0.9 MG/DL (ref 0.1–1.5)
BILIRUB UR QL STRIP.AUTO: NEGATIVE
BUN SERPL-MCNC: 23 MG/DL (ref 8–22)
BUN SERPL-MCNC: 39 MG/DL (ref 8–22)
CALCIUM ALBUM COR SERPL-MCNC: 8.2 MG/DL (ref 8.5–10.5)
CALCIUM SERPL-MCNC: 6.4 MG/DL (ref 8.5–10.5)
CALCIUM SERPL-MCNC: 6.6 MG/DL (ref 8.5–10.5)
CHLORIDE SERPL-SCNC: 91 MMOL/L (ref 96–112)
CHLORIDE SERPL-SCNC: 96 MMOL/L (ref 96–112)
CO2 SERPL-SCNC: 18 MMOL/L (ref 20–33)
CO2 SERPL-SCNC: 36 MMOL/L (ref 20–33)
COLOR UR: YELLOW
CREAT SERPL-MCNC: 0.9 MG/DL (ref 0.5–1.4)
CREAT SERPL-MCNC: 2.28 MG/DL (ref 0.5–1.4)
CREAT UR-MCNC: 115.37 MG/DL
EKG IMPRESSION: NORMAL
EOSINOPHIL # BLD AUTO: 0.26 K/UL (ref 0–0.51)
EOSINOPHIL NFR BLD: 3.4 % (ref 0–6.9)
EPI CELLS #/AREA URNS HPF: ABNORMAL /HPF
ERYTHROCYTE [DISTWIDTH] IN BLOOD BY AUTOMATED COUNT: 48 FL (ref 35.9–50)
FERRITIN SERPL-MCNC: 129 NG/ML (ref 10–291)
GFR SERPLBLD CREATININE-BSD FMLA CKD-EPI: 23 ML/MIN/1.73 M 2
GFR SERPLBLD CREATININE-BSD FMLA CKD-EPI: 71 ML/MIN/1.73 M 2
GLOBULIN SER CALC-MCNC: 4 G/DL (ref 1.9–3.5)
GLUCOSE SERPL-MCNC: 127 MG/DL (ref 65–99)
GLUCOSE SERPL-MCNC: 82 MG/DL (ref 65–99)
GLUCOSE UR STRIP.AUTO-MCNC: NEGATIVE MG/DL
HCT VFR BLD AUTO: 25.7 % (ref 37–47)
HGB BLD-MCNC: 8.3 G/DL (ref 12–16)
HYALINE CASTS #/AREA URNS LPF: ABNORMAL /LPF
IMM GRANULOCYTES # BLD AUTO: 0.03 K/UL (ref 0–0.11)
IMM GRANULOCYTES NFR BLD AUTO: 0.4 % (ref 0–0.9)
INR PPP: 2.34 (ref 0.87–1.13)
IRON SATN MFR SERPL: 9 % (ref 15–55)
IRON SERPL-MCNC: 11 UG/DL (ref 40–170)
KETONES UR STRIP.AUTO-MCNC: ABNORMAL MG/DL
LEUKOCYTE ESTERASE UR QL STRIP.AUTO: ABNORMAL
LYMPHOCYTES # BLD AUTO: 1.05 K/UL (ref 1–4.8)
LYMPHOCYTES NFR BLD: 13.8 % (ref 22–41)
MCH RBC QN AUTO: 26.9 PG (ref 27–33)
MCHC RBC AUTO-ENTMCNC: 32.3 G/DL (ref 32.2–35.5)
MCV RBC AUTO: 83.4 FL (ref 81.4–97.8)
MICRO URNS: ABNORMAL
MONOCYTES # BLD AUTO: 0.59 K/UL (ref 0–0.85)
MONOCYTES NFR BLD AUTO: 7.8 % (ref 0–13.4)
NEUTROPHILS # BLD AUTO: 5.63 K/UL (ref 1.82–7.42)
NEUTROPHILS NFR BLD: 73.9 % (ref 44–72)
NITRITE UR QL STRIP.AUTO: NEGATIVE
NRBC # BLD AUTO: 0 K/UL
NRBC BLD-RTO: 0 /100 WBC (ref 0–0.2)
PH UR STRIP.AUTO: 5 [PH] (ref 5–8)
PLATELET # BLD AUTO: 69 K/UL (ref 164–446)
PLATELETS.RETICULATED NFR BLD AUTO: 5.5 % (ref 0.6–13.1)
POTASSIUM SERPL-SCNC: 4.3 MMOL/L (ref 3.6–5.5)
POTASSIUM SERPL-SCNC: 4.6 MMOL/L (ref 3.6–5.5)
PROT SERPL-MCNC: 6 G/DL (ref 6–8.2)
PROT UR QL STRIP: 30 MG/DL
PROTHROMBIN TIME: 26 SEC (ref 12–14.6)
RBC # BLD AUTO: 3.08 M/UL (ref 4.2–5.4)
RBC # URNS HPF: ABNORMAL /HPF
RBC UR QL AUTO: ABNORMAL
SIGNIFICANT IND 70042: ABNORMAL
SITE SITE: ABNORMAL
SODIUM SERPL-SCNC: 126 MMOL/L (ref 135–145)
SODIUM SERPL-SCNC: 131 MMOL/L (ref 135–145)
SODIUM UR-SCNC: <20 MMOL/L
SOURCE SOURCE: ABNORMAL
SP GR UR STRIP.AUTO: 1.03
T4 FREE SERPL-MCNC: 1.3 NG/DL (ref 0.93–1.7)
TIBC SERPL-MCNC: 117 UG/DL (ref 250–450)
TSH SERPL DL<=0.005 MIU/L-ACNC: 5.5 UIU/ML (ref 0.38–5.33)
UIBC SERPL-MCNC: 106 UG/DL (ref 110–370)
UROBILINOGEN UR STRIP.AUTO-MCNC: 1 MG/DL
WBC # BLD AUTO: 7.6 K/UL (ref 4.8–10.8)
WBC #/AREA URNS HPF: ABNORMAL /HPF

## 2024-05-18 PROCEDURE — 82570 ASSAY OF URINE CREATININE: CPT

## 2024-05-18 PROCEDURE — 770020 HCHG ROOM/CARE - TELE (206)

## 2024-05-18 PROCEDURE — 84443 ASSAY THYROID STIM HORMONE: CPT

## 2024-05-18 PROCEDURE — 85055 RETICULATED PLATELET ASSAY: CPT

## 2024-05-18 PROCEDURE — 51798 US URINE CAPACITY MEASURE: CPT

## 2024-05-18 PROCEDURE — A9270 NON-COVERED ITEM OR SERVICE: HCPCS | Performed by: STUDENT IN AN ORGANIZED HEALTH CARE EDUCATION/TRAINING PROGRAM

## 2024-05-18 PROCEDURE — A9270 NON-COVERED ITEM OR SERVICE: HCPCS

## 2024-05-18 PROCEDURE — 84439 ASSAY OF FREE THYROXINE: CPT

## 2024-05-18 PROCEDURE — 85025 COMPLETE CBC W/AUTO DIFF WBC: CPT

## 2024-05-18 PROCEDURE — 700111 HCHG RX REV CODE 636 W/ 250 OVERRIDE (IP): Mod: JZ | Performed by: STUDENT IN AN ORGANIZED HEALTH CARE EDUCATION/TRAINING PROGRAM

## 2024-05-18 PROCEDURE — 80048 BASIC METABOLIC PNL TOTAL CA: CPT

## 2024-05-18 PROCEDURE — 81001 URINALYSIS AUTO W/SCOPE: CPT

## 2024-05-18 PROCEDURE — 700102 HCHG RX REV CODE 250 W/ 637 OVERRIDE(OP): Performed by: STUDENT IN AN ORGANIZED HEALTH CARE EDUCATION/TRAINING PROGRAM

## 2024-05-18 PROCEDURE — 99232 SBSQ HOSP IP/OBS MODERATE 35: CPT | Mod: GC | Performed by: INTERNAL MEDICINE

## 2024-05-18 PROCEDURE — 85610 PROTHROMBIN TIME: CPT

## 2024-05-18 PROCEDURE — 83540 ASSAY OF IRON: CPT

## 2024-05-18 PROCEDURE — 700105 HCHG RX REV CODE 258

## 2024-05-18 PROCEDURE — 36415 COLL VENOUS BLD VENIPUNCTURE: CPT

## 2024-05-18 PROCEDURE — 700102 HCHG RX REV CODE 250 W/ 637 OVERRIDE(OP)

## 2024-05-18 PROCEDURE — 80053 COMPREHEN METABOLIC PANEL: CPT

## 2024-05-18 PROCEDURE — 84300 ASSAY OF URINE SODIUM: CPT

## 2024-05-18 PROCEDURE — 83550 IRON BINDING TEST: CPT

## 2024-05-18 PROCEDURE — 700105 HCHG RX REV CODE 258: Performed by: STUDENT IN AN ORGANIZED HEALTH CARE EDUCATION/TRAINING PROGRAM

## 2024-05-18 PROCEDURE — 82728 ASSAY OF FERRITIN: CPT

## 2024-05-18 RX ORDER — OXYCODONE HYDROCHLORIDE 5 MG/1
5 TABLET ORAL EVERY 6 HOURS PRN
Status: DISCONTINUED | OUTPATIENT
Start: 2024-05-18 | End: 2024-05-25 | Stop reason: HOSPADM

## 2024-05-18 RX ORDER — SODIUM CHLORIDE 9 MG/ML
INJECTION, SOLUTION INTRAVENOUS CONTINUOUS
Status: ACTIVE | OUTPATIENT
Start: 2024-05-18 | End: 2024-05-18

## 2024-05-18 RX ADMIN — ATORVASTATIN CALCIUM 20 MG: 20 TABLET, FILM COATED ORAL at 21:06

## 2024-05-18 RX ADMIN — PIPERACILLIN AND TAZOBACTAM 4.5 G: 4; .5 INJECTION, POWDER, FOR SOLUTION INTRAVENOUS at 12:07

## 2024-05-18 RX ADMIN — OMEPRAZOLE 40 MG: 20 CAPSULE, DELAYED RELEASE ORAL at 05:15

## 2024-05-18 RX ADMIN — OXYCODONE 5 MG: 5 TABLET ORAL at 23:03

## 2024-05-18 RX ADMIN — SODIUM CHLORIDE: 9 INJECTION, SOLUTION INTRAVENOUS at 11:39

## 2024-05-18 RX ADMIN — PIPERACILLIN AND TAZOBACTAM 4.5 G: 4; .5 INJECTION, POWDER, FOR SOLUTION INTRAVENOUS at 04:22

## 2024-05-18 RX ADMIN — PIPERACILLIN AND TAZOBACTAM 4.5 G: 4; .5 INJECTION, POWDER, FOR SOLUTION INTRAVENOUS at 21:06

## 2024-05-18 RX ADMIN — GABAPENTIN 200 MG: 100 CAPSULE ORAL at 04:21

## 2024-05-18 RX ADMIN — OXYCODONE HYDROCHLORIDE 10 MG: 10 TABLET ORAL at 12:05

## 2024-05-18 RX ADMIN — GABAPENTIN 200 MG: 100 CAPSULE ORAL at 16:42

## 2024-05-18 RX ADMIN — OXYCODONE HYDROCHLORIDE 10 MG: 10 TABLET ORAL at 04:25

## 2024-05-18 ASSESSMENT — ENCOUNTER SYMPTOMS
CHILLS: 0
PALPITATIONS: 0
SHORTNESS OF BREATH: 1
NAUSEA: 0
BLOOD IN STOOL: 0
ORTHOPNEA: 0
ABDOMINAL PAIN: 1
MYALGIAS: 0
CONSTIPATION: 0
VOMITING: 0
SPUTUM PRODUCTION: 1
DIARRHEA: 1
FEVER: 0
NEUROLOGICAL NEGATIVE: 1
WHEEZING: 0
COUGH: 1

## 2024-05-18 ASSESSMENT — FIBROSIS 4 INDEX
FIB4 SCORE: 4.63
FIB4 SCORE: 4.63

## 2024-05-18 ASSESSMENT — PAIN DESCRIPTION - PAIN TYPE
TYPE: ACUTE PAIN
TYPE: ACUTE PAIN

## 2024-05-18 NOTE — PROGRESS NOTES
[facility] Internal Medicine Daily Progress Note    Date of Service  5/18/2024    R Team: R IM Blue Team   Attending: Maryanne Phillips M.d.  Senior Resident: Dr. Mathias   Intern:  Dr. Hernandez  Contact Number: [phone]    Chief Complaint  April Alicia is a 64 y.o. female admitted 5/16/2024 with vaginal bleeding.     Hospital Course  April Alicia is a 64 y.o. 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 She presented 5/16/2024 with fevers, chills, hematuria, and shortness of breath. In the ER, she was tachycardic, febrile, requiring 1-2 lt O2. CT imaging showed a left lower lobe consolidation suggestive of pneumonia and a small pleural effusion. CT abdomen without significant ascites, had bladder wall thickening without kidney abnormalities. Had a leukocytosis > 14,000, sodium 131, creatinine 1.42, lactic acid of 2.6. urinalysis with significant hematuria and inflammatory changes. Admitted for sepsis due to HAP and UTI. Started on Linezolid and Zosyn. Urine and blood cultures in process.     Interval Problem Update  No acute events overnight.  Patient states her abdominal pain is still there although it is better than yesterday.  She reported she had 3-4 bowel movements yesterday after the lactulose.    -Bedside ultrasound done to assess for ascites.  Revealed trace fluid collection.  -Creatinine continues to trend up.  Patient appears dry on exam.  Given 500 mL NS bolus over 2 hours with repeat BMP ordered for afternoon.    I have discussed this patient's plan of care and discharge plan at IDT rounds today with Case Management, Nursing, Nursing leadership, and other members of the IDT  team.    Consultants/Specialty  None     Code Status  Full Code    Disposition  The patient is not medically cleared for discharge to home or a post-acute facility.    I have placed the appropriate orders for post-discharge needs.    Review of Systems  Review of Systems   Constitutional:  Negative for chills and fever.   Respiratory:  Positive for cough, sputum production and shortness of breath. Negative for wheezing.    Cardiovascular:  Negative for chest pain, palpitations, orthopnea and leg swelling.   Gastrointestinal:  Positive for abdominal pain and diarrhea. Negative for blood in stool, constipation, melena, nausea and vomiting.   Musculoskeletal:  Negative for myalgias.   Skin:  Negative for rash.   Neurological: Negative.         Physical Exam  Temp:  [36.3 °C (97.3 °F)-36.9 °C (98.4 °F)] 36.3 °C (97.3 °F)  Pulse:  [62-92] 92  Resp:  [18-20] 19  BP: ()/(56-65) 99/61  SpO2:  [95 %-100 %] 97 %    Physical Exam  Constitutional:       General: She is not in acute distress.     Appearance: She is not ill-appearing.   HENT:      Head: Normocephalic.      Mouth/Throat:      Mouth: Mucous membranes are dry.   Eyes:      General: No scleral icterus.        Right eye: No discharge.         Left eye: No discharge.   Cardiovascular:      Rate and Rhythm: Normal rate and regular rhythm.      Pulses: Normal pulses.      Heart sounds: Normal heart sounds.   Pulmonary:      Effort: Pulmonary effort is normal.      Breath sounds: Normal breath sounds.   Abdominal:      Palpations: Abdomen is soft.      Tenderness: There is no guarding or rebound.      Comments: Mild to moderate lower abdominal tenderness to palpation   Musculoskeletal:      Right lower leg: No edema.      Left lower leg: No edema.   Skin:     General: Skin is warm and dry.      Coloration: Skin is not jaundiced.   Neurological:      Mental Status: She is alert and oriented to person, place, and time. Mental status is at baseline.   Psychiatric:          Mood and Affect: Mood normal.        Fluids    Intake/Output Summary (Last 24 hours) at 5/18/2024 1429  Last data filed at 5/18/2024 1200  Gross per 24 hour   Intake 720 ml   Output 200 ml   Net 520 ml       Laboratory  Recent Labs     05/16/24 1911 05/17/24 0303 05/18/24  0117   WBC 14.2* 12.3* 7.6   RBC 3.64* 3.08* 3.08*   HEMOGLOBIN 9.8* 8.6* 8.3*   HEMATOCRIT 29.4* 24.9* 25.7*   MCV 80.8* 80.8* 83.4   MCH 26.9* 27.9 26.9*   MCHC 33.3 34.5 32.3   RDW 47.4 47.5 48.0   PLATELETCT 106* 76* 69*   MPV 11.4 12.6  --      Recent Labs     05/16/24 1911 05/17/24 0303 05/18/24  0117 05/18/24  1343   SODIUM 131* 129* 126* 131*   POTASSIUM 4.1 4.1 4.6 4.3   CHLORIDE 100 100 96 91*   CO2 18* 19* 18* 36*   GLUCOSE 161* 125* 127* 82   BUN 28* 31* 39* 23*   CREATININE 1.42* 1.67* 2.28* 0.90   CALCIUM 7.4* 6.7* 6.6*  --      Recent Labs     05/18/24 0117   INR 2.34*               Imaging  CT-ABDOMEN-PELVIS WITH   Final Result         1. New left lower lobe consolidation and small left pleural effusion. This is likely due to pneumonia.   2. Cirrhosis, portal hypertension, and splenomegaly.   3. Atherosclerosis including coronary artery disease.   4. Ascites.   5. Possible colitis of the cecum.      DX-CHEST-PORTABLE (1 VIEW)   Final Result      Hypoinflation and cardiomegaly. Mild edema cannot be excluded.           Assessment/Plan:    * Sepsis (HCC)- (present on admission)  Assessment & Plan  This is Sepsis Present on admission  SIRS criteria identified on my evaluation include: Fever, with temperature greater than 100.9 deg F, Tachycardia, with heart rate greater than 90 BPM, and Leukocytosis, with WBC greater than 12,000  Clinical indicators of end organ dysfunction include Lactic Acid greater than 2  Source is pulmonary and urinary.   Sepsis protocol initiated. Resuscitation volume of 2L ordered. Reason that resuscitation volume of less than 30ml/kg was ordered concern for fluid overload  -Continuing IV Zosyn   -One set  up blood cultures growing strep pneumo, continue to monitor   -Urine culture growing MSSA    Bacteremia due to Streptococcus pneumoniae  Assessment & Plan  One set of blood cultures from 5/16 growing Streptococcus pneumoniae.  Likely secondary to pneumonia.  -Continue IV Zosyn  -No indication to repeat blood cultures unless of there is clinical deterioration or lack of improvement    Hospital-acquired pneumonia  Assessment & Plan  Patient with recent discharge from the [facility] several days ago.  Now presenting with a Tmax of 103.6, tachycardia, leukocytosis, a retrocardiac consolidation and positive procalcitonin. Minimal o2 requirements.   CT imaging -- New left lower lobe consolidation and small left pleural effusion.   Has hx of pseudomonas in her prior infected joint (s/p BKA), not isolated in blood, urine  or sputum priorly. Hx of ESBL in urine.   - Continue IV Zosyn (discontinued Linezolid after negative MRSA nares)  - One set of blood culture growing strep pneumo likely 2/2 pneumonia   - O2 per protocol - encourage IS    Acute cystitis with hematuria- (present on admission)  Assessment & Plan  Patient states that she is noticed some blood in urine over the last several days, along with suprapubic pain. Bladder wall thickening in CT without kidney abnormalities.   Concern for UTI - hx of ESBL in urine x2 S to zosyn.   - Continue Zosyn given septic presentation   - Urine culture growing MSSA, follow-up susceptibility report     Hematuria  Assessment & Plan  Per nursing, patient continues to have dark-red colored urine.  [id number] 3-4.  -Continue to hold Eliquis in the setting of hematuria        Hyponatremia- (present on admission)  Assessment & Plan  Resolving   Given 500 mL bolus of NS with improvement of sodium to 131.  -Continue to monitor    Acute kidney injury (HCC)- (present on admission)  Assessment & Plan  Resolving after NS bolus  Likely prerenal in the setting of infection,  diuretic use and hypotension.  In addition all of this, she received contrast on 5/15.  She received a 500 mL NS bolus on 5/18 with considerable improvement in her renal function.  - Holding home dose Lasix, Aldactone, ACE/ARB  -Repeat CHEM panel in a.m.    Alcoholic cirrhosis of liver with ascites (HCC)- (present on admission)  Assessment & Plan  CHILD class B.   Patient has a positive HCV antibody but negative RNA/NAAT - resolved infection.   Recent hospitalization for cirrhosis decompensation with anasarca. No paracentesis needed. No hx of GIB/EV.   No concern for acute decompensation, bedside ultrasound on 5/18 revealed minimal ascites.  - Continue lactulose.  Titrate up to 2-3 bowel movements daily.  - Holding aldactone and lasix   - Monitor I and Os strictly    Acute hypoxemic respiratory failure (HCC)  Assessment & Plan  Secondary to pneumonia. Stable O2 needs, minimal.   - Encourage IS   - See plan for HAP     AF (atrial fibrillation) (HCC)  Assessment & Plan  Rate controlled.   - She does not appear to be on any rate controlling medications outpatient.  Continue to monitor on telemetry.  - Holding apixaban in the setting of hematuria    Hyperglycemia  Assessment & Plan  Per chart review, it was noted the patient has a history of type 2 diabetes, but patient is now at prediabetes range.   - CTM fasting BG     Nausea & vomiting- (present on admission)  Assessment & Plan  IV and PO antiemetics     Lactic acid acidosis- (present on admission)  Assessment & Plan  Resolved  2/2 sepsis.     Normocytic anemia- (present on admission)  Assessment & Plan  Borderline normocytic/microcytic. Iron panel revealed significantly low iron/TIBC. Ferritin is wnl although this means it is likely low given it is an acute phase reactant. As such, this is likely RODOLFO.  -Will defer iron supplementation during acute infectious process  -Will likely need GI consultation at discharge for further workup/procedures          VTE  prophylaxis: therapeutic anticoagulation with apixaban - held for hematuria    I have performed a physical exam and reviewed and updated ROS and Plan today (5/18/2024). In review of yesterday's note (5/17/2024), there are no changes except as documented above.

## 2024-05-18 NOTE — ASSESSMENT & PLAN NOTE
Hypervolemic on exam after NS bolus on 5/18 likely due to third-spacing of fluid from significant hypoalbuminemia.  Additionally, patient's sodium worsened after an NS infusion.  Will try fluid restriction per nephrology recommendations.  Urine sodium <20, urine osmolality 330.  -Nephrology consulted, appreciate recommendations  -1.2 L fluid restriction  -Repeat BMP ordered for 1600

## 2024-05-18 NOTE — ASSESSMENT & PLAN NOTE
Resolving  Secondary to pneumonia. Stable O2 needs, minimal.   - Encourage IS   - See plan for HAP

## 2024-05-18 NOTE — CARE PLAN
The patient is Stable - Low risk of patient condition declining or worsening    Shift Goals  Clinical Goals: safety, monitor skin integrity, monitor labs, ABX  Patient Goals: rest, pain management  Family Goals: gisele    Progress made toward(s) clinical / shift goals:    Problem: Pain - Standard  Goal: Alleviation of pain or a reduction in pain to the patient’s comfort goal  Description: Target End Date:  Prior to discharge or change in level of care    Document on Vitals flowsheet    1.  Document pain using the appropriate pain scale per order or unit policy  2.  Educate and implement non-pharmacologic comfort measures (i.e. relaxation, distraction, massage, cold/heat therapy, etc.)  3.  Pain management medications as ordered  4.  Reassess pain after pain med administration per policy  5.  If opiods administered assess patient's response to pain medication is appropriate per POSS sedation scale  6.  Follow pain management plan developed in collaboration with patient and interdisciplinary team (including palliative care or pain specialists if applicable)  Outcome: Progressing     Problem: Skin Integrity  Goal: Skin integrity is maintained or improved  Description: Target End Date:  Prior to discharge or change in level of care    Document interventions on Skin Risk/Sincere flowsheet groups and corresponding LDA    1.  Assess and monitor skin integrity, appearance and/or temperature  2.  Assess risk factors for impaired skin integrity and/or pressures ulcers  3.  Implement precautions to protect skin integrity in collaboration with interdisciplinary team  4.  Implement pressure ulcer prevention protocol if at risk for skin breakdown  5.  Confirm wound care consult if at risk for skin breakdown  6.  Ensure patient use of pressure relieving devices  (Low air loss bed, waffle overlay, heel protectors, ROHO cushion, etc)  Outcome: Progressing       Patient is not progressing towards the following goals:

## 2024-05-18 NOTE — ASSESSMENT & PLAN NOTE
Patient stated that she noticed some blood in urine over the last several days prior to admission, along with suprapubic pain. Bladder wall thickening in CT without kidney abnormalities.   Concern for UTI - hx of ESBL in urine x2 S to zosyn.   - Urine cultures growing MSSA  - Transitioned to IV ceftriaxone from IV Zosyn, then switched to cefuroxime on 5/20/24

## 2024-05-18 NOTE — ASSESSMENT & PLAN NOTE
CHILD class B.   Patient has a positive HCV antibody but negative RNA/NAAT - resolved infection.   Recent hospitalization for cirrhosis decompensation with anasarca. No paracentesis needed. No hx of GIB/EV.   No concern for acute decompensation, bedside ultrasound on 5/18 revealed minimal ascites.  -Discontinued lactulose on 5/18 due to multiple episodes of diarrhea and volume depletion  - Holding aldactone and lasix   - Monitor I and Os strictly

## 2024-05-18 NOTE — ASSESSMENT & PLAN NOTE
This is Sepsis Present on admission  SIRS criteria identified on my evaluation include: Fever, with temperature greater than 100.9 deg F, Tachycardia, with heart rate greater than 90 BPM, and Leukocytosis, with WBC greater than 12,000  Clinical indicators of end organ dysfunction include Lactic Acid greater than 2  Source is pulmonary and urinary.   Sepsis protocol initiated. Resuscitation volume of 2L ordered. Reason that resuscitation volume of less than 30ml/kg was ordered concern for fluid overload  -One set up blood cultures growing strep pneumo, continue to monitor   -Urine culture growing MSSA  -Transitioned to IV ceftriaxone from IV Zosyn (5/19-)

## 2024-05-18 NOTE — ASSESSMENT & PLAN NOTE
Rate controlled.   - She does not appear to be on any rate controlling medications outpatient.  Continue to monitor on telemetry.  - Restarted Eliquis for stroke prophylaxis (5/19-)

## 2024-05-18 NOTE — ASSESSMENT & PLAN NOTE
Patient with recent discharge from hospital several days ago prior to admission.  Presented with a Tmax of 103.6, tachycardia, leukocytosis, a retrocardiac consolidation and positive procalcitonin. Minimal o2 requirements.   CT imaging -- New left lower lobe consolidation and small left pleural effusion.   Has hx of pseudomonas in her prior infected joint (s/p BKA), not isolated in blood, urine  or sputum priorly. Hx of ESBL in urine.   - Transitioned to IV ceftriaxone from IV Zosyn (5/19-)  - One set of blood culture growing strep pneumo likely 2/2 pneumonia   - O2 per protocol - encourage IS

## 2024-05-18 NOTE — ASSESSMENT & PLAN NOTE
Borderline normocytic/microcytic. Iron panel revealed significantly low iron/TIBC. Ferritin is wnl although this means it is likely low given it is an acute phase reactant. As such, this is likely RODOLFO.  -Will defer iron supplementation during acute infectious process  -Will likely need GI consultation at discharge for further workup/procedures

## 2024-05-18 NOTE — CARE PLAN
The patient is Stable - Low risk of patient condition declining or worsening    Shift Goals  Clinical Goals: Monitor labs/vitals, pain mangement, antibiotic therapy  Patient Goals: Pain management  Family Goals: EDMUND    Progress made toward(s) clinical / shift goals:        Problem: Knowledge Deficit - Standard  Goal: Patient and family/care givers will demonstrate understanding of plan of care, disease process/condition, diagnostic tests and medications  Outcome: Progressing     Problem: Pain - Standard  Goal: Alleviation of pain or a reduction in pain to the patient’s comfort goal  Outcome: Progressing     Problem: Fluid Volume:  Goal: Hemodynamic stability will improve  Outcome: Progressing       Pt's vitals stable, c/o pain in mid abd, and as generalized. PRN Oxycodone 10mg PO given to assist w/pain, good effect noted. IV antibiotics in place, pt tolerating well. Pt having multiple loose BM's since 5/17, with 0600 lactulose being held this AM. This writer made Dr. Mathias and team aware of frequent BM's and lactulose being held. 1L NS X 1 bag given to pt at 250ml/hr, pt tolerated well. PO intake encouraged by pt. Pt bladder scanned to assess for urinary retention. 37ml noted in bladder. Dr. Mathias made aware. No other complications noted. Education provided to pt on all medications and treatments provided, with pt verbalizing understanding. Personal items and call bell left within reach of pt. Plan of care is ongoing at this time.

## 2024-05-18 NOTE — ASSESSMENT & PLAN NOTE
One set of blood cultures from 5/16 growing Streptococcus pneumoniae.  Likely secondary to pneumonia.  -Transitioned to IV ceftriaxone (5/19-). Received IV Zosyn from 5/16-5/18.   -No indication to repeat blood cultures unless of there is clinical deterioration or lack of improvement

## 2024-05-18 NOTE — ASSESSMENT & PLAN NOTE
Likely prerenal in the setting of infection, diuretic use and hypotension. She additionally received contrast upon admission.  FeNa indicated prerenal process.  UA from 5/19 demonstrated hyaline casts once again suggestive of prerenal ELIZABETH from volume depletion.  - Holding home dose Lasix, Aldactone, ACE/ARB  - Nephrology consulted, appreciate recommendations  - Started on sodium bicarb 1300mg TID and Lokelma, increased sodium bicarb to 1950 TID with additional IV bicarb.   - Monitor strictly for ins and outs  - Repeat CHEM panel in a.m.

## 2024-05-18 NOTE — PROGRESS NOTES
Cardiac rhythm: atrial fibrillation - controlled    Ectopy: premature ventricular contractions (unifocal)  Frequency: rare    TN: 0  QRS: 0.08  QT: 0    Rate range (shift):

## 2024-05-19 LAB
ALBUMIN SERPL BCP-MCNC: 2 G/DL (ref 3.2–4.9)
ALBUMIN SERPL BCP-MCNC: 2 G/DL (ref 3.2–4.9)
ALBUMIN/GLOB SERPL: 0.5 G/DL
ALP SERPL-CCNC: 132 U/L (ref 30–99)
ALT SERPL-CCNC: 12 U/L (ref 2–50)
ANION GAP SERPL CALC-SCNC: 10 MMOL/L (ref 7–16)
ANION GAP SERPL CALC-SCNC: 13 MMOL/L (ref 7–16)
APPEARANCE UR: ABNORMAL
AST SERPL-CCNC: 17 U/L (ref 12–45)
BACTERIA #/AREA URNS HPF: NEGATIVE /HPF
BACTERIA BLD CULT: ABNORMAL
BILIRUB SERPL-MCNC: 0.6 MG/DL (ref 0.1–1.5)
BILIRUB UR QL STRIP.AUTO: NEGATIVE
BUN SERPL-MCNC: 44 MG/DL (ref 8–22)
BUN SERPL-MCNC: 44 MG/DL (ref 8–22)
BUN SERPL-MCNC: 47 MG/DL (ref 8–22)
CA-I SERPL-SCNC: 0.9 MMOL/L (ref 1.1–1.3)
CALCIUM ALBUM COR SERPL-MCNC: 7.7 MG/DL (ref 8.5–10.5)
CALCIUM ALBUM COR SERPL-MCNC: 7.9 MG/DL (ref 8.5–10.5)
CALCIUM SERPL-MCNC: 6.1 MG/DL (ref 8.5–10.5)
CALCIUM SERPL-MCNC: 6.3 MG/DL (ref 8.5–10.5)
CALCIUM SERPL-MCNC: 6.3 MG/DL (ref 8.5–10.5)
CHLORIDE SERPL-SCNC: 93 MMOL/L (ref 96–112)
CHLORIDE SERPL-SCNC: 94 MMOL/L (ref 96–112)
CHLORIDE SERPL-SCNC: 95 MMOL/L (ref 96–112)
CO2 SERPL-SCNC: 14 MMOL/L (ref 20–33)
CO2 SERPL-SCNC: 15 MMOL/L (ref 20–33)
CO2 SERPL-SCNC: 17 MMOL/L (ref 20–33)
COLOR UR: YELLOW
CREAT SERPL-MCNC: 2.94 MG/DL (ref 0.5–1.4)
CREAT SERPL-MCNC: 3.03 MG/DL (ref 0.5–1.4)
CREAT SERPL-MCNC: 3.34 MG/DL (ref 0.5–1.4)
EPI CELLS #/AREA URNS HPF: ABNORMAL /HPF
ERYTHROCYTE [DISTWIDTH] IN BLOOD BY AUTOMATED COUNT: 47.8 FL (ref 35.9–50)
ETEST SENSITIVITY ETEST: NORMAL
GFR SERPLBLD CREATININE-BSD FMLA CKD-EPI: 15 ML/MIN/1.73 M 2
GFR SERPLBLD CREATININE-BSD FMLA CKD-EPI: 17 ML/MIN/1.73 M 2
GFR SERPLBLD CREATININE-BSD FMLA CKD-EPI: 17 ML/MIN/1.73 M 2
GLOBULIN SER CALC-MCNC: 3.9 G/DL (ref 1.9–3.5)
GLUCOSE SERPL-MCNC: 112 MG/DL (ref 65–99)
GLUCOSE SERPL-MCNC: 117 MG/DL (ref 65–99)
GLUCOSE SERPL-MCNC: 131 MG/DL (ref 65–99)
GLUCOSE UR STRIP.AUTO-MCNC: NEGATIVE MG/DL
HCT VFR BLD AUTO: 25.8 % (ref 37–47)
HCV RNA SERPL NAA+PROBE-ACNC: NOT DETECTED IU/ML
HCV RNA SERPL NAA+PROBE-LOG IU: NOT DETECTED LOG IU/ML
HCV RNA SERPL QL NAA+PROBE: NOT DETECTED
HGB BLD-MCNC: 8.3 G/DL (ref 12–16)
HYALINE CASTS #/AREA URNS LPF: ABNORMAL /LPF
KETONES UR STRIP.AUTO-MCNC: ABNORMAL MG/DL
LEUKOCYTE ESTERASE UR QL STRIP.AUTO: ABNORMAL
MAGNESIUM SERPL-MCNC: 1.6 MG/DL (ref 1.5–2.5)
MCH RBC QN AUTO: 26.4 PG (ref 27–33)
MCHC RBC AUTO-ENTMCNC: 32.2 G/DL (ref 32.2–35.5)
MCV RBC AUTO: 82.2 FL (ref 81.4–97.8)
MICRO URNS: ABNORMAL
NITRITE UR QL STRIP.AUTO: NEGATIVE
OSMOLALITY UR: 332 MOSM/KG H2O (ref 300–900)
PH UR STRIP.AUTO: 5 [PH] (ref 5–8)
PHOSPHATE SERPL-MCNC: 5.6 MG/DL (ref 2.5–4.5)
PLATELET # BLD AUTO: 118 K/UL (ref 164–446)
POTASSIUM SERPL-SCNC: 5.1 MMOL/L (ref 3.6–5.5)
POTASSIUM SERPL-SCNC: 5.5 MMOL/L (ref 3.6–5.5)
POTASSIUM SERPL-SCNC: 5.5 MMOL/L (ref 3.6–5.5)
PROT SERPL-MCNC: 5.9 G/DL (ref 6–8.2)
PROT UR QL STRIP: 30 MG/DL
RBC # BLD AUTO: 3.14 M/UL (ref 4.2–5.4)
RBC # URNS HPF: ABNORMAL /HPF
RBC UR QL AUTO: ABNORMAL
SIGNIFICANT IND 70042: ABNORMAL
SITE SITE: ABNORMAL
SODIUM SERPL-SCNC: 121 MMOL/L (ref 135–145)
SODIUM SERPL-SCNC: 122 MMOL/L (ref 135–145)
SODIUM SERPL-SCNC: 122 MMOL/L (ref 135–145)
SOURCE SOURCE: ABNORMAL
SP GR UR STRIP.AUTO: 1.04
UROBILINOGEN UR STRIP.AUTO-MCNC: 0.2 MG/DL
WBC # BLD AUTO: 5.8 K/UL (ref 4.8–10.8)
WBC #/AREA URNS HPF: ABNORMAL /HPF

## 2024-05-19 PROCEDURE — 36415 COLL VENOUS BLD VENIPUNCTURE: CPT

## 2024-05-19 PROCEDURE — 700102 HCHG RX REV CODE 250 W/ 637 OVERRIDE(OP)

## 2024-05-19 PROCEDURE — 700105 HCHG RX REV CODE 258: Performed by: STUDENT IN AN ORGANIZED HEALTH CARE EDUCATION/TRAINING PROGRAM

## 2024-05-19 PROCEDURE — 81001 URINALYSIS AUTO W/SCOPE: CPT

## 2024-05-19 PROCEDURE — 770020 HCHG ROOM/CARE - TELE (206)

## 2024-05-19 PROCEDURE — 700102 HCHG RX REV CODE 250 W/ 637 OVERRIDE(OP): Performed by: STUDENT IN AN ORGANIZED HEALTH CARE EDUCATION/TRAINING PROGRAM

## 2024-05-19 PROCEDURE — 700101 HCHG RX REV CODE 250

## 2024-05-19 PROCEDURE — A9270 NON-COVERED ITEM OR SERVICE: HCPCS | Performed by: INTERNAL MEDICINE

## 2024-05-19 PROCEDURE — 700102 HCHG RX REV CODE 250 W/ 637 OVERRIDE(OP): Performed by: INTERNAL MEDICINE

## 2024-05-19 PROCEDURE — 700111 HCHG RX REV CODE 636 W/ 250 OVERRIDE (IP): Mod: JZ | Performed by: STUDENT IN AN ORGANIZED HEALTH CARE EDUCATION/TRAINING PROGRAM

## 2024-05-19 PROCEDURE — A9270 NON-COVERED ITEM OR SERVICE: HCPCS | Performed by: STUDENT IN AN ORGANIZED HEALTH CARE EDUCATION/TRAINING PROGRAM

## 2024-05-19 PROCEDURE — 83735 ASSAY OF MAGNESIUM: CPT

## 2024-05-19 PROCEDURE — 80048 BASIC METABOLIC PNL TOTAL CA: CPT

## 2024-05-19 PROCEDURE — 700111 HCHG RX REV CODE 636 W/ 250 OVERRIDE (IP)

## 2024-05-19 PROCEDURE — A9270 NON-COVERED ITEM OR SERVICE: HCPCS

## 2024-05-19 PROCEDURE — 82330 ASSAY OF CALCIUM: CPT

## 2024-05-19 PROCEDURE — 80069 RENAL FUNCTION PANEL: CPT

## 2024-05-19 PROCEDURE — 85027 COMPLETE CBC AUTOMATED: CPT

## 2024-05-19 PROCEDURE — 80053 COMPREHEN METABOLIC PANEL: CPT

## 2024-05-19 PROCEDURE — 83935 ASSAY OF URINE OSMOLALITY: CPT

## 2024-05-19 PROCEDURE — 99233 SBSQ HOSP IP/OBS HIGH 50: CPT | Mod: GC | Performed by: INTERNAL MEDICINE

## 2024-05-19 RX ORDER — SODIUM BICARBONATE 650 MG/1
1300 TABLET ORAL 3 TIMES DAILY
Status: DISCONTINUED | OUTPATIENT
Start: 2024-05-19 | End: 2024-05-20

## 2024-05-19 RX ORDER — MAGNESIUM SULFATE HEPTAHYDRATE 40 MG/ML
4 INJECTION, SOLUTION INTRAVENOUS ONCE
Status: COMPLETED | OUTPATIENT
Start: 2024-05-19 | End: 2024-05-19

## 2024-05-19 RX ORDER — CALCIUM GLUCONATE 20 MG/ML
1 INJECTION, SOLUTION INTRAVENOUS ONCE
Status: COMPLETED | OUTPATIENT
Start: 2024-05-19 | End: 2024-05-19

## 2024-05-19 RX ORDER — CALCIUM ACETATE 667 MG/1
667 TABLET ORAL
Status: DISCONTINUED | OUTPATIENT
Start: 2024-05-19 | End: 2024-05-22

## 2024-05-19 RX ADMIN — MAGNESIUM SULFATE HEPTAHYDRATE 4 G: 4 INJECTION, SOLUTION INTRAVENOUS at 17:29

## 2024-05-19 RX ADMIN — APIXABAN 5 MG: 5 TABLET, FILM COATED ORAL at 17:23

## 2024-05-19 RX ADMIN — GABAPENTIN 200 MG: 100 CAPSULE ORAL at 05:10

## 2024-05-19 RX ADMIN — SODIUM BICARBONATE 1300 MG: 650 TABLET ORAL at 17:24

## 2024-05-19 RX ADMIN — GABAPENTIN 200 MG: 100 CAPSULE ORAL at 17:23

## 2024-05-19 RX ADMIN — ATORVASTATIN CALCIUM 20 MG: 20 TABLET, FILM COATED ORAL at 21:44

## 2024-05-19 RX ADMIN — SODIUM ZIRCONIUM CYCLOSILICATE 10 G: 10 POWDER, FOR SUSPENSION ORAL at 12:31

## 2024-05-19 RX ADMIN — PIPERACILLIN AND TAZOBACTAM 4.5 G: 4; .5 INJECTION, POWDER, FOR SOLUTION INTRAVENOUS at 05:10

## 2024-05-19 RX ADMIN — CALCIUM GLUCONATE 1 G: 20 INJECTION, SOLUTION INTRAVENOUS at 09:43

## 2024-05-19 RX ADMIN — SODIUM BICARBONATE 1300 MG: 650 TABLET ORAL at 12:31

## 2024-05-19 RX ADMIN — OXYCODONE 5 MG: 5 TABLET ORAL at 09:18

## 2024-05-19 RX ADMIN — SODIUM ZIRCONIUM CYCLOSILICATE 10 G: 10 POWDER, FOR SUSPENSION ORAL at 17:24

## 2024-05-19 RX ADMIN — SODIUM BICARBONATE 1300 MG: 650 TABLET ORAL at 09:08

## 2024-05-19 RX ADMIN — PIPERACILLIN AND TAZOBACTAM 4.5 G: 4; .5 INJECTION, POWDER, FOR SOLUTION INTRAVENOUS at 12:34

## 2024-05-19 RX ADMIN — CEFTRIAXONE SODIUM 2000 MG: 10 INJECTION, POWDER, FOR SOLUTION INTRAVENOUS at 15:20

## 2024-05-19 RX ADMIN — SODIUM ZIRCONIUM CYCLOSILICATE 10 G: 10 POWDER, FOR SUSPENSION ORAL at 09:08

## 2024-05-19 RX ADMIN — OXYCODONE 5 MG: 5 TABLET ORAL at 17:34

## 2024-05-19 RX ADMIN — Medication 667 MG: at 17:23

## 2024-05-19 RX ADMIN — OMEPRAZOLE 40 MG: 20 CAPSULE, DELAYED RELEASE ORAL at 05:09

## 2024-05-19 ASSESSMENT — ENCOUNTER SYMPTOMS
ORTHOPNEA: 0
CHILLS: 0
ABDOMINAL PAIN: 1
WHEEZING: 0
FEVER: 0
COUGH: 0
NAUSEA: 0
CONSTIPATION: 0
SPUTUM PRODUCTION: 0
PALPITATIONS: 0
VOMITING: 0
DIARRHEA: 0
SHORTNESS OF BREATH: 0

## 2024-05-19 ASSESSMENT — PAIN DESCRIPTION - PAIN TYPE
TYPE: CHRONIC PAIN
TYPE: ACUTE PAIN;CHRONIC PAIN

## 2024-05-19 ASSESSMENT — PATIENT HEALTH QUESTIONNAIRE - PHQ9
1. LITTLE INTEREST OR PLEASURE IN DOING THINGS: NOT AT ALL
SUM OF ALL RESPONSES TO PHQ9 QUESTIONS 1 AND 2: 0
SUM OF ALL RESPONSES TO PHQ9 QUESTIONS 1 AND 2: 0
2. FEELING DOWN, DEPRESSED, IRRITABLE, OR HOPELESS: NOT AT ALL
2. FEELING DOWN, DEPRESSED, IRRITABLE, OR HOPELESS: NOT AT ALL
1. LITTLE INTEREST OR PLEASURE IN DOING THINGS: NOT AT ALL

## 2024-05-19 ASSESSMENT — FIBROSIS 4 INDEX: FIB4 SCORE: 2.66

## 2024-05-19 NOTE — CARE PLAN
The patient is Stable - Low risk of patient condition declining or worsening    Shift Goals  Clinical Goals: monitor labs, collect urine sample, maintain skin integrity  Patient Goals: rest, go home  Family Goals: gisele    Progress made toward(s) clinical / shift goals:    Problem: Pain - Standard  Goal: Alleviation of pain or a reduction in pain to the patient’s comfort goal  Description: Target End Date:  Prior to discharge or change in level of care    Document on Vitals flowsheet    1.  Document pain using the appropriate pain scale per order or unit policy  2.  Educate and implement non-pharmacologic comfort measures (i.e. relaxation, distraction, massage, cold/heat therapy, etc.)  3.  Pain management medications as ordered  4.  Reassess pain after pain med administration per policy  5.  If opiods administered assess patient's response to pain medication is appropriate per POSS sedation scale  6.  Follow pain management plan developed in collaboration with patient and interdisciplinary team (including palliative care or pain specialists if applicable)  Outcome: Progressing     Problem: Skin Integrity  Goal: Skin integrity is maintained or improved  Description: Target End Date:  Prior to discharge or change in level of care    Document interventions on Skin Risk/Sincere flowsheet groups and corresponding LDA    1.  Assess and monitor skin integrity, appearance and/or temperature  2.  Assess risk factors for impaired skin integrity and/or pressures ulcers  3.  Implement precautions to protect skin integrity in collaboration with interdisciplinary team  4.  Implement pressure ulcer prevention protocol if at risk for skin breakdown  5.  Confirm wound care consult if at risk for skin breakdown  6.  Ensure patient use of pressure relieving devices  (Low air loss bed, waffle overlay, heel protectors, ROHO cushion, etc)  Outcome: Progressing       Patient is not progressing towards the following goals:

## 2024-05-19 NOTE — CONSULTS
[facility] -  CONSULTATION NOTE               Author: Tiki Camara M.D. Date & Time: 5/19/2024  9:28 AM       REASON FOR CONSULTATION:   Evaluation management of ELIZABETH    HISTORY OF PRESENT ILLNESS:    64yoF with PMH significant for HTN, DM II, alcoholic cirrhosis with ascites, atrial fibrillation on anticoagulation, history of left-sided BKA, recent hospitalization for decompensated cirrhosis, admitted with complaints of fevers chills and SOB x 2 days and now with ELIZABETH and electrolyte abnormalities.  On admission patient was found to have sepsis thought to be secondary to hospital-acquired pneumonia and UTI and so she was started on broad-spectrum antibiotics with linezolid and Zosyn.  She also has bacteremia with Streptococcus pneumoniae and urine culture with MSSA.  She did undergo a CT abdomen/pelvis with IV contrast 5/16/2024 that showed new left lower lobe lung consolidation and small left pleural effusion likely due to pneumonia, cirrhosis and portal hypertension and splenomegaly, and ascites, and possible colitis of the cecum.  On admission her creatinine was 1.4 and prior to that during her recent hospital admission for decompensated cirrhosis earlier this month her creatinine was ~0.7 (was 0.7 on 5/14/2024 on discharge).  Her creatinine increased to 1.67 on 5/17/2024 and then yesterday it increased to 2.28 and today it is higher at 3.03.  She did receive an NS bolus yesterday.  Her sodium this morning is low at 122 and her potassium is elevated at 5.5 and she does have metabolic acidosis.  Unclear if her UOP is being documented accurately but 310 cc over the past 24 hours is recorded.  She denies any F/C/N/V/CP/SOB, + LE edema, + abdominal pain, + loose stools, denies any HA/vision changes.    REVIEW OF SYSTEMS:    10 point ROS was performed and is as per HPI or otherwise negative    PAST MEDICAL HISTORY:   Past Medical History:   Diagnosis Date    Abnormal finding of lung  12/27/2022    Acute exacerbation of chronic obstructive pulmonary disease (COPD) ([facility]) 01/27/2020    Alcoholic cirrhosis ([facility])     Arthritis     OA in knees    ASTHMA     Atrial fibrillation ([facility])     Dry eyes 11/16/2017    Edentulous     upper and lower dentures    Emphysema of lung ([facility])     H/O: hysterectomy 12/05/2019    Heart burn     left knee    Hepatic encephalopathy (HCC) 12/27/2022    Hepatitis C infection 07/28/2022    History of rheumatic fever 09/04/2017    Hypertension     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HCC)     Recurrent, L knee, x4 with surgical washout    Medication overuse headache 08/15/2017    Mitral regurgitation     Pancytopenia ([facility]) 07/26/2022    Pneumonia 02/2020    Primary osteoarthritis of left knee 08/25/2020    Prosthetic joint infection ([facility]) 2018    Right knee after total knee    Protein-calorie malnutrition, severe ([facility]) 08/01/2022    Right leg DVT ([facility]) 2018    acute, resolved    Seizure disorder ([facility])     Septic arthritis of knee, left ([facility]) 07/19/2022    Septic arthritis of shoulder, left ([facility]) 07/17/2022    Septic shock due to Pseudomonas species ([facility]) 10/30/2023    Type 2 diabetes mellitus ([facility]) 12/27/2022       PAST SURGICAL HISTORY:   Past Surgical History:   Procedure Laterality Date    KNEE AMPUTATION ABOVE Left 1/16/2024    Procedure: LEFT ABOVE KNEE AMPUTATION;  Surgeon: Obdulio Gray M.D.;  Location: [facility];  Service: Orthopedics    PB TOTAL KNEE ARTHROPLASTY Left 10/30/2023    Procedure: LEFT TOTAL KNEE ARTHROPLASTY EXPLANTATION, INSERTION OF ANTIBIOTIC SPACER, AND APPLICATION OF INCISIONAL WOUND VACUUM;  Surgeon: Wild Luz M.D.;  Location: [facility];  Service: Orthopedics    PB REVISE KNEE JOINT REPLACE,ALL PARTS Left 12/28/2022    Procedure: REVISION, TOTAL ARTHROPLASTY, KNEE, ALL COMPONENTS-LEFT;  Surgeon: Wild Luz M.D.;  Location: [facility];  Service: Orthopedics    IRRIGATION & DEBRIDEMENT GENERAL Left 12/28/2022     Procedure: IRRIGATION AND DEBRIDEMENT, WOUND;  Surgeon: Wild Luz M.D.;  Location: SURGERY [facility];  Service: Orthopedics    PB REVISE KNEE JOINT REPLACE,ALL PARTS Left 7/19/2022    Procedure: REVISION, TOTAL ARTHROPLASTY, KNEE, ALL COMPONENTS - ANTIBIOTIC SPACER;  Surgeon: Wild Luz M.D.;  Location: SURGERY [facility];  Service: Orthopedics    IRRIGATION & DEBRIDEMENT GENERAL Left 7/17/2022    Procedure: IRRIGATION AND DEBRIDEMENT, SHOULDER;  Surgeon: Obdulio Gray M.D.;  Location: SURGERY [facility];  Service: Orthopedics    PB TOTAL KNEE ARTHROPLASTY Left 8/24/2020    Procedure: ARTHROPLASTY, KNEE, TOTAL;  Surgeon: Víctor Faustin M.D.;  Location: [facility];  Service: Orthopedics    KNEE ARTHROPLASTY TOTAL Right 2018    IRRIGATION & DEBRIDEMENT ORTHO Right 9/3/2017    Procedure: IRRIGATION & DEBRIDEMENT ORTHO, POLY EXCHANGE;  Surgeon: Jerome Russell M.D.;  Location: [facility];  Service: Orthopedics    COLONOSCOPY WITH CLIPPING  10/28/2015    Procedure: COLONOSCOPY WITH CLIPPING;  Surgeon: Ruben Colon M.D.;  Location: [facility];  Service:     COLONOSCOPY WITH SCLEROTHERAPY  10/28/2015    Procedure: COLONOSCOPY WITH SCLEROTHERAPY;  Surgeon: Ruben Colon M.D.;  Location: [facility];  Service:     COLONOSCOPY WITH TATTOOING  10/28/2015    Procedure: COLONOSCOPY WITH TATTOOING;  Surgeon: Ruben Colon M.D.;  Location: [facility];  Service:     GYN SURGERY  2003    hysteroscoopy    GYN SURGERY  1982    tubal ligation       FAMILY HISTORY:   Family History   Problem Relation Age of Onset    Diabetes Mother     Seizures Father     Heart Attack Father 45    Diabetes Brother     Alcohol abuse Brother     Dementia Brother     Diabetes Brother        SOCIAL HISTORY:   Social History     Tobacco Use   Smoking Status Former    Current packs/day: 0.00    Types: Cigarettes    Quit date: 12/29/2016    Years since  "quittin.3   Smokeless Tobacco Former    Quit date: 2021   Tobacco Comments    a few a day when I can     Social History     Substance and Sexual Activity   Alcohol Use Not Currently    Comment: hx of AUD     Social History     Substance and Sexual Activity   Drug Use Not Currently       HOME MEDICATIONS:   Reviewed and documented in chart    LABORATORY STUDIES:   Recent Labs     24  0117 24  1343 24  0018   SODIUM 126* 131* 122*  122*   POTASSIUM 4.6 4.3 5.5  5.5   CHLORIDE 96 91* 94*  95*   CO2 18* 36* 14*  17*   GLUCOSE 127* 82 112*  117*   BUN 39* 23* 44*  44*   CREATININE 2.28* 0.90 2.94*  3.03*   CALCIUM 6.6* 6.4* 6.3*  6.1*       ALLERGIES:  Patient has no known allergies.    VS:  /76   Pulse (!) 104   Temp 36.6 °C (97.9 °F) (Temporal)   Resp 18   Ht 1.702 m (5' 7\")   Wt 101 kg (223 lb 12.3 oz)   LMP 2008   SpO2 97%   BMI 35.05 kg/m²     Physical Exam  Vitals and nursing note reviewed.   Constitutional:       Appearance: Normal appearance.   HENT:      Head: Normocephalic and atraumatic.   Eyes:      General: No scleral icterus.     Extraocular Movements: Extraocular movements intact.   Cardiovascular:      Rate and Rhythm: Normal rate and regular rhythm.   Pulmonary:      Effort: Pulmonary effort is normal. No respiratory distress.      Breath sounds: Normal breath sounds.   Abdominal:      General: Bowel sounds are normal. There is no distension.      Palpations: Abdomen is soft.      Tenderness: There is abdominal tenderness.   Musculoskeletal:         General: No deformity.      Cervical back: Normal range of motion and neck supple.      Right lower leg: Edema present.      Left lower leg: Edema present.      Comments: +L BKA   Skin:     General: Skin is warm and dry.      Findings: No rash.   Neurological:      General: No focal deficit present.      Mental Status: She is alert and oriented to person, place, and time.   Psychiatric:         Mood and " Affect: Mood normal.         Behavior: Behavior normal. Behavior is cooperative.      Comments: +slow speech         FLUID BALANCE:  In: 1200 [P.O.:1200]  Out: 310     IMAGING:  All imaging reviewed from admission to present day    IMPRESSION:  # ELIZABETH--creatinine 0.7 on 5/14/2024 during her previous visit  -ELIZABETH likely related to LORETTA and some relative hypotension and possible prerenal etiology  -Diuretics have been held  -Received IV fluids yesterday  -Oliguric but unclear if UOP is being documented accurately  #Hyponatremia  -Diuretics now held, patient also had been on spironolactone  -Has been having several loose bowel movements related to lactulose  -Urine osmolality elevated 332 and urine Na low  #Hyperkalemia  -Will treat medically  #Metabolic acidosis  -Start p.o. bicarb supplements  # Leukocytosis--present on admission and now improving  -On antibiotics for Streptococcus bacteremia, hospital-acquired pneumonia, and MSSA UTI  #Anemia--iron sat low  #Thrombocytopenia--likely secondary to liver disease, monitor  #Hypocalcemia  -Received IV supplementation  #Alcoholic cirrhosis--with ascites  -Diuretics currently held  #Hyperphosphatemia--secondary to ELIZABETH  #Hypomagnesemia    PLAN:  - No acute need for RRT but will monitor closely  -This patient is a poor long-term dialysis candidate given her liver disease and other comorbidities  -Recommend oral fluid restriction less than 1.2 L/day  -Start Lokelma  -Start p.o. bicarbonate supplements 1300 mg 3 times daily  -Calcium gluconate ordered  -Start PhosLo 667 mg 3 times daily with meals  -Order IV magnesium replacement  - Recheck labs this afternoon  -Check iPTH and 25-hydroxy vitamin D levels  -Antibiotics per primary service  - No IV iron given active infection, ok to give PO iron supplements  -Avoid further IV contrast, nephrotoxins, NSAIDs  -Dose all meds for decreased GFR  -Other management per primary service    **Discussed with IM Resident    Thank you for the  consultation!

## 2024-05-19 NOTE — PROGRESS NOTES
[facility] [place] Internal Medicine Daily Progress Note    Date of Service  5/19/2024    [facility] [place] Team: R IM Blue Team   Attending: Maryanne Phillips M.d.  Senior Resident: Dr. Mathias   Intern:  Dr. Hernandez  Contact Number: [phone]    Chief Complaint  April Alicia is a 64 y.o. female admitted 5/16/2024 with vaginal bleeding.     Hospital Course  April Alicia is a 64 y.o. 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 She presented 5/16/2024 with fevers, chills, hematuria, and shortness of breath. In the ER, she was tachycardic, febrile, requiring 1-2 lt O2. CT imaging showed a left lower lobe consolidation suggestive of pneumonia and a small pleural effusion. CT abdomen without significant ascites, had bladder wall thickening without kidney abnormalities. Had a leukocytosis > 14,000, sodium 131, creatinine 1.42, lactic acid of 2.6. urinalysis with significant hematuria and inflammatory changes. Admitted for sepsis due to HAP and UTI. Started on Linezolid and Zosyn.  Urine culture growing MSSA 1 set of blood cultures growing strep pneumo.  Linezolid was later discontinued and the patient was clinically improving on IV Zosyn.  However, her renal function continued to worsen even with the withholding of diuretics and a small NS bolus.  Nephrology was consulted.    Interval Problem Update  No acute events overnight.  Patient reports she feels okay although she just woke up.  Her abdominal pain is still there although she thinks it is a little bit better this morning.  -Renal function continued to worsen this morning.  BMP from yesterday afternoon was likely an outlier.  Nephrology consulted for further recommendations  -Given 1 g of IV  calcium gluconate  -Transitioned to IV ceftriaxone to narrow coverage  -Started on 1.2L fluid restriction per nephro recs   I have discussed this patient's plan of care and discharge plan at IDT rounds today with Case Management, Nursing, Nursing leadership, and other members of the IDT team.    Consultants/Specialty  Nephrology  Code Status  Full Code    Disposition  The patient is not medically cleared for discharge to home or a post-acute facility.      I have placed the appropriate orders for post-discharge needs.    Review of Systems  Review of Systems   Constitutional:  Negative for chills and fever.   Respiratory:  Negative for cough, sputum production, shortness of breath and wheezing.    Cardiovascular:  Negative for chest pain, palpitations, orthopnea and leg swelling.   Gastrointestinal:  Positive for abdominal pain. Negative for constipation, diarrhea, nausea and vomiting.        Physical Exam  Temp:  [36.5 °C (97.7 °F)-36.6 °C (97.9 °F)] 36.5 °C (97.7 °F)  Pulse:  [] 88  Resp:  [16-19] 16  BP: ()/(57-80) 141/80  SpO2:  [96 %-100 %] 96 %    Physical Exam  Constitutional:       General: She is not in acute distress.     Appearance: She is not ill-appearing.   HENT:      Head: Normocephalic.      Mouth/Throat:      Mouth: Mucous membranes are dry.   Eyes:      General: No scleral icterus.        Right eye: No discharge.         Left eye: No discharge.   Cardiovascular:      Rate and Rhythm: Normal rate and regular rhythm.      Pulses: Normal pulses.      Heart sounds: Normal heart sounds.   Pulmonary:      Effort: Pulmonary effort is normal.      Breath sounds: Normal breath sounds.   Abdominal:      Palpations: Abdomen is soft.      Tenderness: There is no guarding or rebound.      Comments: Mild to moderate lower abdominal tenderness to palpation improving since yesterday   Musculoskeletal:      Right lower leg: Edema present.      Comments: 1+ pitting edema of RLE  LLE BKA   Skin:      General: Skin is warm and dry.      Coloration: Skin is not jaundiced.   Neurological:      Mental Status: She is alert and oriented to person, place, and time. Mental status is at baseline.   Psychiatric:         Mood and Affect: Mood normal.          Fluids    Intake/Output Summary (Last 24 hours) at 5/19/2024 1202  Last data filed at 5/19/2024 1000  Gross per 24 hour   Intake 1080 ml   Output 310 ml   Net 770 ml       Laboratory  Recent Labs     05/16/24  1911 05/17/24  0303 05/18/24  0117 05/19/24  0018   WBC 14.2* 12.3* 7.6 5.8   RBC 3.64* 3.08* 3.08* 3.14*   HEMOGLOBIN 9.8* 8.6* 8.3* 8.3*   HEMATOCRIT 29.4* 24.9* 25.7* 25.8*   MCV 80.8* 80.8* 83.4 82.2   MCH 26.9* 27.9 26.9* 26.4*   MCHC 33.3 34.5 32.3 32.2   RDW 47.4 47.5 48.0 47.8   PLATELETCT 106* 76* 69* 118*   MPV 11.4 12.6  --   --      Recent Labs     05/18/24  0117 05/18/24  1343 05/19/24  0018   SODIUM 126* 131* 122*  122*   POTASSIUM 4.6 4.3 5.5  5.5   CHLORIDE 96 91* 94*  95*   CO2 18* 36* 14*  17*   GLUCOSE 127* 82 112*  117*   BUN 39* 23* 44*  44*   CREATININE 2.28* 0.90 2.94*  3.03*   CALCIUM 6.6* 6.4* 6.3*  6.1*     Recent Labs     05/18/24  0117   INR 2.34*               Imaging  CT-ABDOMEN-PELVIS WITH   Final Result         1. New left lower lobe consolidation and small left pleural effusion. This is likely due to pneumonia.   2. Cirrhosis, portal hypertension, and splenomegaly.   3. Atherosclerosis including coronary artery disease.   4. Ascites.   5. Possible colitis of the cecum.      DX-CHEST-PORTABLE (1 VIEW)   Final Result      Hypoinflation and cardiomegaly. Mild edema cannot be excluded.           Assessment/Plan:    * Sepsis (HCC)- (present on admission)  Assessment & Plan  This is Sepsis Present on admission  SIRS criteria identified on my evaluation include: Fever, with temperature greater than 100.9 deg F, Tachycardia, with heart rate greater than 90 BPM, and Leukocytosis, with WBC greater than 12,000  Clinical indicators  of end organ dysfunction include Lactic Acid greater than 2  Source is pulmonary and urinary.   Sepsis protocol initiated. Resuscitation volume of 2L ordered. Reason that resuscitation volume of less than 30ml/kg was ordered concern for fluid overload  -One set up blood cultures growing strep pneumo, continue to monitor   -Urine culture growing MSSA  -Transitioned to IV ceftriaxone from IV Zosyn (5/19-)    Bacteremia due to Streptococcus pneumoniae  Assessment & Plan  One set of blood cultures from 5/16 growing Streptococcus pneumoniae.  Likely secondary to pneumonia.  -Transitioned to IV ceftriaxone (5/19-). Received IV Zosyn from 5/16-5/18.   -No indication to repeat blood cultures unless of there is clinical deterioration or lack of improvement    Hospital-acquired pneumonia  Assessment & Plan  Patient with recent discharge from hospital several days ago prior to admission.  Presented with a Tmax of 103.6, tachycardia, leukocytosis, a retrocardiac consolidation and positive procalcitonin. Minimal o2 requirements.   CT imaging -- New left lower lobe consolidation and small left pleural effusion.   Has hx of pseudomonas in her prior infected joint (s/p BKA), not isolated in blood, urine  or sputum priorly. Hx of ESBL in urine.   - Transitioned to IV ceftriaxone from IV Zosyn (5/19-)  - One set of blood culture growing strep pneumo likely 2/2 pneumonia   - O2 per protocol - encourage IS    Hyponatremia- (present on admission)  Assessment & Plan  Hypervolemic on exam after NS bolus on 5/18 likely due to third-spacing of fluid from significant hypoalbuminemia.  Additionally, patient's sodium worsened after an NS infusion.  Will try fluid restriction per nephrology recommendations.  Urine sodium <20, urine osmolality 330.  -Nephrology consulted, appreciate recommendations  -1.2 L fluid restriction  -Repeat BMP ordered for 1600    Acute cystitis with hematuria- (present on admission)  Assessment & Plan  Patient stated  that she noticed some blood in urine over the last several days prior to admission, along with suprapubic pain. Bladder wall thickening in CT without kidney abnormalities.   Concern for UTI - hx of ESBL in urine x2 S to zosyn.   - Urine cultures growing MSSA  - Transitioned to IV ceftriaxone from IV Zosyn (5/19-)    Acute kidney injury (HCC)- (present on admission)  Assessment & Plan  Likely prerenal in the setting of infection, diuretic use and hypotension. She additionally received contrast upon admission.  FeNa indicated prerenal process.  UA from 5/19 demonstrated hyaline casts once again suggestive of prerenal ELIZABETH from volume depletion.  - Holding home dose Lasix, Aldactone, ACE/ARB  - Nephrology consulted, appreciate recommendations  - Started on sodium bicarb 1300mg TID and Lokelma   - Monitor strictly for ins and outs  - Repeat CHEM panel in a.m.    Hematuria  Assessment & Plan  Resolving  -Restarted Eliquis for stroke prophylaxis (5/19-)        Alcoholic cirrhosis of liver with ascites (HCC)- (present on admission)  Assessment & Plan  CHILD class B.   Patient has a positive HCV antibody but negative RNA/NAAT - resolved infection.   Recent hospitalization for cirrhosis decompensation with anasarca. No paracentesis needed. No hx of GIB/EV.   No concern for acute decompensation, bedside ultrasound on 5/18 revealed minimal ascites.  -Discontinued lactulose on 5/18 due to multiple episodes of diarrhea and volume depletion  - Holding aldactone and lasix   - Monitor I and Os strictly    Acute hypoxemic respiratory failure (HCC)  Assessment & Plan  Resolving  Secondary to pneumonia. Stable O2 needs, minimal.   - Encourage IS   - See plan for HAP     AF (atrial fibrillation) (HCC)  Assessment & Plan  Rate controlled.   - She does not appear to be on any rate controlling medications outpatient.  Continue to monitor on telemetry.  - Restarted Eliquis for stroke prophylaxis (5/19-)    Hyperglycemia  Assessment &  Plan  Per chart review, it was noted the patient has a history of type 2 diabetes, but patient is now at prediabetes range.   - CTM fasting BG     Nausea & vomiting- (present on admission)  Assessment & Plan  IV and PO antiemetics     Lactic acid acidosis- (present on admission)  Assessment & Plan  Resolved  2/2 sepsis.     Normocytic anemia- (present on admission)  Assessment & Plan  Borderline normocytic/microcytic. Iron panel revealed significantly low iron/TIBC. Ferritin is wnl although this means it is likely low given it is an acute phase reactant. As such, this is likely RODOLFO.  -Will defer iron supplementation during acute infectious process  -Will likely need GI consultation at discharge for further workup/procedures          VTE prophylaxis: Eliquis     I have performed a physical exam and reviewed and updated ROS and Plan today (5/19/2024). In review of yesterday's note (5/18/2024), there are no changes except as documented above.

## 2024-05-19 NOTE — PROGRESS NOTES
Critical calcium level called to this writer by lab at approx 1625 for a calcium level of 6.4. Dr. Mathias made aware.

## 2024-05-19 NOTE — CARE PLAN
The patient is Stable - Low risk of patient condition declining or worsening    Shift Goals  Clinical Goals: Pain management, Collect urine samples, maintain skin integrity, IV abx  Patient Goals: Rest and get better  Family Goals: EDMUND    Progress made toward(s) clinical / shift goals:      Problem: Pain - Standard  Goal: Alleviation of pain or a reduction in pain to the patient’s comfort goal  Description: Target End Date:  Prior to discharge or change in level of care    Document on Vitals flowsheet    1.  Document pain using the appropriate pain scale per order or unit policy  2.  Educate and implement non-pharmacologic comfort measures (i.e. relaxation, distraction, massage, cold/heat therapy, etc.)  3.  Pain management medications as ordered  4.  Reassess pain after pain med administration per policy  5.  If opiods administered assess patient's response to pain medication is appropriate per POSS sedation scale  6.  Follow pain management plan developed in collaboration with patient and interdisciplinary team (including palliative care or pain specialists if applicable)  Outcome: Progressing     Problem: Skin Integrity  Goal: Skin integrity is maintained or improved  Description: Target End Date:  Prior to discharge or change in level of care    Document interventions on Skin Risk/Sincere flowsheet groups and corresponding LDA    1.  Assess and monitor skin integrity, appearance and/or temperature  2.  Assess risk factors for impaired skin integrity and/or pressures ulcers  3.  Implement precautions to protect skin integrity in collaboration with interdisciplinary team  4.  Implement pressure ulcer prevention protocol if at risk for skin breakdown  5.  Confirm wound care consult if at risk for skin breakdown  6.  Ensure patient use of pressure relieving devices  (Low air loss bed, waffle overlay, heel protectors, ROHO cushion, etc)  Outcome: Progressing     Problem: Fall Risk  Goal: Patient will remain free from  falls  Description: Target End Date:  Prior to discharge or change in level of care    Document interventions on the Juliana Dickey Fall Risk Assessment    1.  Assess for fall risk factors  2.  Implement fall precautions  Outcome: Progressing       Patient is not progressing towards the following goals:

## 2024-05-20 ENCOUNTER — APPOINTMENT (OUTPATIENT)
Dept: RADIOLOGY | Facility: MEDICAL CENTER | Age: 64
DRG: 871 | End: 2024-05-20
Payer: MEDICAID

## 2024-05-20 PROBLEM — R40.0 DROWSINESS: Status: ACTIVE | Noted: 2024-05-20

## 2024-05-20 LAB
25(OH)D3 SERPL-MCNC: 20 NG/ML (ref 30–100)
ALBUMIN SERPL BCP-MCNC: 1.8 G/DL (ref 3.2–4.9)
ALBUMIN/GLOB SERPL: 0.5 G/DL
ALP SERPL-CCNC: 154 U/L (ref 30–99)
ALT SERPL-CCNC: 9 U/L (ref 2–50)
ANION GAP SERPL CALC-SCNC: 13 MMOL/L (ref 7–16)
AST SERPL-CCNC: 15 U/L (ref 12–45)
BILIRUB SERPL-MCNC: 0.4 MG/DL (ref 0.1–1.5)
BUN SERPL-MCNC: 50 MG/DL (ref 8–22)
CA-I SERPL-SCNC: 0.9 MMOL/L (ref 1.1–1.3)
CALCIUM ALBUM COR SERPL-MCNC: 8.2 MG/DL (ref 8.5–10.5)
CALCIUM SERPL-MCNC: 6.4 MG/DL (ref 8.5–10.5)
CHLORIDE SERPL-SCNC: 92 MMOL/L (ref 96–112)
CO2 SERPL-SCNC: 16 MMOL/L (ref 20–33)
CREAT SERPL-MCNC: 3.55 MG/DL (ref 0.5–1.4)
ERYTHROCYTE [DISTWIDTH] IN BLOOD BY AUTOMATED COUNT: 45.6 FL (ref 35.9–50)
GFR SERPLBLD CREATININE-BSD FMLA CKD-EPI: 14 ML/MIN/1.73 M 2
GLOBULIN SER CALC-MCNC: 4 G/DL (ref 1.9–3.5)
GLUCOSE SERPL-MCNC: 140 MG/DL (ref 65–99)
HCT VFR BLD AUTO: 24.8 % (ref 37–47)
HGB BLD-MCNC: 8.2 G/DL (ref 12–16)
MAGNESIUM SERPL-MCNC: 2.3 MG/DL (ref 1.5–2.5)
MCH RBC QN AUTO: 26.5 PG (ref 27–33)
MCHC RBC AUTO-ENTMCNC: 33.1 G/DL (ref 32.2–35.5)
MCV RBC AUTO: 80.3 FL (ref 81.4–97.8)
PHOSPHATE SERPL-MCNC: 5.7 MG/DL (ref 2.5–4.5)
PLATELET # BLD AUTO: 97 K/UL (ref 164–446)
POTASSIUM SERPL-SCNC: 4.8 MMOL/L (ref 3.6–5.5)
PROT SERPL-MCNC: 5.8 G/DL (ref 6–8.2)
PTH-INTACT SERPL-MCNC: 160 PG/ML (ref 14–72)
RBC # BLD AUTO: 3.09 M/UL (ref 4.2–5.4)
SODIUM SERPL-SCNC: 121 MMOL/L (ref 135–145)
WBC # BLD AUTO: 5.2 K/UL (ref 4.8–10.8)

## 2024-05-20 PROCEDURE — 83970 ASSAY OF PARATHORMONE: CPT

## 2024-05-20 PROCEDURE — A9270 NON-COVERED ITEM OR SERVICE: HCPCS | Performed by: STUDENT IN AN ORGANIZED HEALTH CARE EDUCATION/TRAINING PROGRAM

## 2024-05-20 PROCEDURE — 97530 THERAPEUTIC ACTIVITIES: CPT

## 2024-05-20 PROCEDURE — 700102 HCHG RX REV CODE 250 W/ 637 OVERRIDE(OP)

## 2024-05-20 PROCEDURE — 83735 ASSAY OF MAGNESIUM: CPT

## 2024-05-20 PROCEDURE — 76705 ECHO EXAM OF ABDOMEN: CPT

## 2024-05-20 PROCEDURE — 36415 COLL VENOUS BLD VENIPUNCTURE: CPT

## 2024-05-20 PROCEDURE — A9270 NON-COVERED ITEM OR SERVICE: HCPCS

## 2024-05-20 PROCEDURE — 84100 ASSAY OF PHOSPHORUS: CPT

## 2024-05-20 PROCEDURE — 770020 HCHG ROOM/CARE - TELE (206)

## 2024-05-20 PROCEDURE — 700111 HCHG RX REV CODE 636 W/ 250 OVERRIDE (IP)

## 2024-05-20 PROCEDURE — A9270 NON-COVERED ITEM OR SERVICE: HCPCS | Performed by: INTERNAL MEDICINE

## 2024-05-20 PROCEDURE — 700102 HCHG RX REV CODE 250 W/ 637 OVERRIDE(OP): Performed by: INTERNAL MEDICINE

## 2024-05-20 PROCEDURE — 700102 HCHG RX REV CODE 250 W/ 637 OVERRIDE(OP): Performed by: STUDENT IN AN ORGANIZED HEALTH CARE EDUCATION/TRAINING PROGRAM

## 2024-05-20 PROCEDURE — 85027 COMPLETE CBC AUTOMATED: CPT

## 2024-05-20 PROCEDURE — 80053 COMPREHEN METABOLIC PANEL: CPT

## 2024-05-20 PROCEDURE — 99233 SBSQ HOSP IP/OBS HIGH 50: CPT | Mod: GC | Performed by: INTERNAL MEDICINE

## 2024-05-20 PROCEDURE — 82330 ASSAY OF CALCIUM: CPT

## 2024-05-20 PROCEDURE — 700111 HCHG RX REV CODE 636 W/ 250 OVERRIDE (IP): Mod: JZ | Performed by: INTERNAL MEDICINE

## 2024-05-20 PROCEDURE — 700101 HCHG RX REV CODE 250

## 2024-05-20 PROCEDURE — 82306 VITAMIN D 25 HYDROXY: CPT

## 2024-05-20 RX ORDER — SODIUM BICARBONATE 650 MG/1
1950 TABLET ORAL 3 TIMES DAILY
Status: DISCONTINUED | OUTPATIENT
Start: 2024-05-20 | End: 2024-05-24

## 2024-05-20 RX ORDER — GABAPENTIN 300 MG/1
300 CAPSULE ORAL DAILY
Status: DISCONTINUED | OUTPATIENT
Start: 2024-05-21 | End: 2024-05-25 | Stop reason: HOSPADM

## 2024-05-20 RX ORDER — CALCIUM GLUCONATE 20 MG/ML
2 INJECTION, SOLUTION INTRAVENOUS ONCE
Status: COMPLETED | OUTPATIENT
Start: 2024-05-20 | End: 2024-05-20

## 2024-05-20 RX ORDER — CEFUROXIME AXETIL 250 MG/1
250 TABLET ORAL EVERY 12 HOURS
Status: DISCONTINUED | OUTPATIENT
Start: 2024-05-21 | End: 2024-05-23

## 2024-05-20 RX ADMIN — OXYCODONE 5 MG: 5 TABLET ORAL at 06:37

## 2024-05-20 RX ADMIN — SODIUM ZIRCONIUM CYCLOSILICATE 10 G: 10 POWDER, FOR SUSPENSION ORAL at 09:25

## 2024-05-20 RX ADMIN — APIXABAN 5 MG: 5 TABLET, FILM COATED ORAL at 06:06

## 2024-05-20 RX ADMIN — OXYCODONE 5 MG: 5 TABLET ORAL at 00:26

## 2024-05-20 RX ADMIN — GABAPENTIN 200 MG: 100 CAPSULE ORAL at 06:06

## 2024-05-20 RX ADMIN — SODIUM BICARBONATE 1300 MG: 650 TABLET ORAL at 06:05

## 2024-05-20 RX ADMIN — SODIUM BICARBONATE 1950 MG: 650 TABLET ORAL at 11:44

## 2024-05-20 RX ADMIN — APIXABAN 5 MG: 5 TABLET, FILM COATED ORAL at 16:20

## 2024-05-20 RX ADMIN — Medication 667 MG: at 08:03

## 2024-05-20 RX ADMIN — Medication 667 MG: at 11:44

## 2024-05-20 RX ADMIN — SODIUM ZIRCONIUM CYCLOSILICATE 10 G: 10 POWDER, FOR SUSPENSION ORAL at 16:20

## 2024-05-20 RX ADMIN — SODIUM BICARBONATE 1950 MG: 650 TABLET ORAL at 16:55

## 2024-05-20 RX ADMIN — CEFTRIAXONE SODIUM 2000 MG: 10 INJECTION, POWDER, FOR SOLUTION INTRAVENOUS at 06:06

## 2024-05-20 RX ADMIN — ATORVASTATIN CALCIUM 20 MG: 20 TABLET, FILM COATED ORAL at 21:26

## 2024-05-20 RX ADMIN — OMEPRAZOLE 40 MG: 20 CAPSULE, DELAYED RELEASE ORAL at 06:06

## 2024-05-20 RX ADMIN — OXYCODONE 5 MG: 5 TABLET ORAL at 16:55

## 2024-05-20 RX ADMIN — SODIUM BICARBONATE 50 MEQ: 84 INJECTION INTRAVENOUS at 14:30

## 2024-05-20 RX ADMIN — SODIUM ZIRCONIUM CYCLOSILICATE 10 G: 10 POWDER, FOR SUSPENSION ORAL at 11:44

## 2024-05-20 RX ADMIN — Medication 667 MG: at 16:20

## 2024-05-20 RX ADMIN — CALCIUM GLUCONATE 2 G: 20 INJECTION, SOLUTION INTRAVENOUS at 10:18

## 2024-05-20 ASSESSMENT — ENCOUNTER SYMPTOMS
CONSTIPATION: 0
NAUSEA: 0
ORTHOPNEA: 0
WHEEZING: 0
FEVER: 0
PALPITATIONS: 0
ABDOMINAL PAIN: 1
SPUTUM PRODUCTION: 0
VOMITING: 0
CHILLS: 0
SHORTNESS OF BREATH: 0
COUGH: 0
DIARRHEA: 0

## 2024-05-20 ASSESSMENT — COGNITIVE AND FUNCTIONAL STATUS - GENERAL
WALKING IN HOSPITAL ROOM: TOTAL
MOBILITY SCORE: 13
MOVING FROM LYING ON BACK TO SITTING ON SIDE OF FLAT BED: A LITTLE
STANDING UP FROM CHAIR USING ARMS: A LOT
MOVING TO AND FROM BED TO CHAIR: A LOT
SUGGESTED CMS G CODE MODIFIER MOBILITY: CL
CLIMB 3 TO 5 STEPS WITH RAILING: TOTAL

## 2024-05-20 ASSESSMENT — PAIN DESCRIPTION - PAIN TYPE
TYPE: CHRONIC PAIN
TYPE: ACUTE PAIN
TYPE: CHRONIC PAIN
TYPE: ACUTE PAIN

## 2024-05-20 ASSESSMENT — GAIT ASSESSMENTS: GAIT LEVEL OF ASSIST: UNABLE TO PARTICIPATE

## 2024-05-20 ASSESSMENT — FIBROSIS 4 INDEX: FIB4 SCORE: 3.3

## 2024-05-20 NOTE — PROGRESS NOTES
Telemetry Report:        Per Telestrip from Monitor Room     Afib   PVC, Couplet  -/.08/-

## 2024-05-20 NOTE — CARE PLAN
The patient is Stable - Low risk of patient condition declining or worsening    Shift Goals  Clinical Goals: Pain management, IV abx, electrolyte replacement  Patient Goals: Rest  Family Goals: pt get better    Progress made toward(s) clinical / shift goals:    Problem: Knowledge Deficit - Standard  Goal: Patient and family/care givers will demonstrate understanding of plan of care, disease process/condition, diagnostic tests and medications  Description: Target End Date:  1-3 days or as soon as patient condition allows    Document in Patient Education    1.  Patient and family/caregiver oriented to unit, equipment, visitation policy and means for communicating concern  2.  Complete/review Learning Assessment  3.  Assess knowledge level of disease process/condition, treatment plan, diagnostic tests and medications  4.  Explain disease process/condition, treatment plan, diagnostic tests and medications  Outcome: Progressing     Problem: Skin Integrity  Goal: Skin integrity is maintained or improved  Description: Target End Date:  Prior to discharge or change in level of care    Document interventions on Skin Risk/Sincere flowsheet groups and corresponding LDA    1.  Assess and monitor skin integrity, appearance and/or temperature  2.  Assess risk factors for impaired skin integrity and/or pressures ulcers  3.  Implement precautions to protect skin integrity in collaboration with interdisciplinary team  4.  Implement pressure ulcer prevention protocol if at risk for skin breakdown  5.  Confirm wound care consult if at risk for skin breakdown  6.  Ensure patient use of pressure relieving devices  (Low air loss bed, waffle overlay, heel protectors, ROHO cushion, etc)  Outcome: Progressing     Problem: Fall Risk  Goal: Patient will remain free from falls  Description: Target End Date:  Prior to discharge or change in level of care    Document interventions on the Juliana Dickey Fall Risk Assessment    1.  Assess for fall  risk factors  2.  Implement fall precautions  Outcome: Progressing

## 2024-05-20 NOTE — PROGRESS NOTES
[facility] -  PROGRESS NOTE               Author: Scott Roper D.O. Date & Time: 5/20/2024  10:58 AM     HPI:  64yoF with PMH significant for HTN, DM II, alcoholic cirrhosis with ascites, atrial fibrillation on anticoagulation, history of left-sided BKA, recent hospitalization for decompensated cirrhosis, admitted with complaints of fevers chills and SOB x 2 days and now with ELIZABETH and electrolyte abnormalities.  On admission patient was found to have sepsis thought to be secondary to hospital-acquired pneumonia and UTI and so she was started on broad-spectrum antibiotics with linezolid and Zosyn.  She also has bacteremia with Streptococcus pneumoniae and urine culture with MSSA.  She did undergo a CT abdomen/pelvis with IV contrast 5/16/2024 that showed new left lower lobe lung consolidation and small left pleural effusion likely due to pneumonia, cirrhosis and portal hypertension and splenomegaly, and ascites, and possible colitis of the cecum.  On admission her creatinine was 1.4 and prior to that during her recent hospital admission for decompensated cirrhosis earlier this month her creatinine was ~0.7 (was 0.7 on 5/14/2024 on discharge).  Her creatinine increased to 1.67 on 5/17/2024 and then yesterday it increased to 2.28 and today it is higher at 3.03.  She did receive an NS bolus yesterday.  Her sodium this morning is low at 122 and her potassium is elevated at 5.5 and she does have metabolic acidosis.  Unclear if her UOP is being documented accurately but 310 cc over the past 24 hours is recorded.  She denies any F/C/N/V/CP/SOB, + LE edema, + abdominal pain, + loose stools, denies any HA/vision changes.    DAILY NEPHROLOGY SUMMARY:  5/20 - No acute events overnight, vitals stable, remains oliguric w/  in the past 24 hours. Overall feels improved compared to yesterday. Slow speech.    REVIEW OF SYSTEMS:    10 point ROS reviewed and is as per HPI/daily summary or otherwise  "negative    PMH/PSH/SH/FH: Reviewed and unchanged since admission note  CURRENT MEDICATIONS: Reviewed from admission to present day    VS:  /56   Pulse 85   Temp 36.8 °C (98.2 °F) (Temporal)   Resp 18   Ht 1.702 m (5' 7\")   Wt 106 kg (232 lb 12.9 oz)   LMP 06/02/2008   SpO2 93%   BMI 36.46 kg/m²   Physical Exam  Constitutional:       General: She is not in acute distress.  HENT:      Head: Normocephalic and atraumatic.      Nose: Nose normal.      Mouth/Throat:      Mouth: Mucous membranes are moist.   Eyes:      Extraocular Movements: Extraocular movements intact.      Conjunctiva/sclera: Conjunctivae normal.      Pupils: Pupils are equal, round, and reactive to light.   Cardiovascular:      Rate and Rhythm: Normal rate and regular rhythm.      Heart sounds: No murmur heard.     No friction rub. No gallop.   Pulmonary:      Effort: Pulmonary effort is normal.      Breath sounds: Normal breath sounds. No wheezing, rhonchi or rales.   Abdominal:      General: There is no distension.      Palpations: Abdomen is soft.      Tenderness: There is no abdominal tenderness. There is no guarding or rebound.   Musculoskeletal:      Right lower leg: Edema (trace) present.      Comments: S/p L BKA   Skin:     General: Skin is warm.   Neurological:      Mental Status: She is alert.      Comments: Slow speech   Psychiatric:         Mood and Affect: Mood normal.         Behavior: Behavior normal.         Fluids:  In: 610 [P.O.:610]  Out: 400     LABS:  Recent Labs     05/19/24  0018 05/19/24  1602 05/20/24  0027   SODIUM 122*  122* 121* 121*   POTASSIUM 5.5  5.5 5.1 4.8   CHLORIDE 94*  95* 93* 92*   CO2 14*  17* 15* 16*   GLUCOSE 112*  117* 131* 140*   BUN 44*  44* 47* 50*   CREATININE 2.94*  3.03* 3.34* 3.55*   CALCIUM 6.3*  6.1* 6.3* 6.4*       IMPRESSION:  # ELIZABETH--creatinine 0.7 on 5/14/2024 during her previous visit  -ELIZABETH likely related to LORETTA and some relative hypotension and possible prerenal " etiology  -Oliguric but unclear if UOP is being documented accurately  #Hyponatremia  -Diuretics now held, patient also had been on spironolactone  -Has been having several loose bowel movements related to lactulose  -Urine osmolality elevated 332 and urine Na low  #Hyperkalemia--2/2 ELIZABETH, resolve  -Treating medically  #Metabolic acidosis  -Increase p.o. bicarb supplements  -Providing 1 amp of bicarb  #Leukocytosis--present on admission and now resolved  -On antibiotics for Streptococcus bacteremia, hospital-acquired pneumonia, and MSSA UTI  #Anemia--iron sat low  #Thrombocytopenia--likely secondary to liver disease, monitor  #Hypocalcemia--PTH elevated and Vit D low  -Recieving IV supplementation  #Alcoholic cirrhosis--with ascites  -Diuretics currently held  #Hyperphosphatemia--secondary to ELIZABETH  #Hypomagnesemia--Likely in the setting of loose BM's, resolved    PLAN:  -No acute need for RRT but will monitor closely  -This patient is a poor long-term dialysis candidate given her liver disease and other comorbidities  -Continue oral fluid restriction less than 1.2 L/day  -Continue Lokelma  -Increased p.o. bicarbonate supplements 1950 mg 3 times daily, providing 1 amp of bicarb  -Calcium gluconate ordered  -Continue PhosLo 667 mg 3 times daily with meals  -Antibiotics per primary service  -No IV iron given active infection, ok to give PO iron supplements  -Avoid further IV contrast, nephrotoxins, NSAIDs  -Dose all meds for decreased GFR  -Other management per primary service

## 2024-05-20 NOTE — PROGRESS NOTES
[facility] Internal Medicine Daily Progress Note    Date of Service  5/20/2024    [facility] Team: [facility] IM Blue Team   Attending: Maryanne Phillips M.d.  Senior Resident: Dr. Schulte  Intern:  Dr. Richards  Contact Number: [phone]    Chief Complaint  April Alicia is a 64 y.o. female admitted 5/16/2024 with vaginal bleeding/hematuria    Hospital Course  April Alicia is a 64 y.o. 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 She presented 5/16/2024 with fevers, chills, hematuria, and shortness of breath. In the ER, she was tachycardic, febrile, requiring 1-2 lt O2. CT imaging showed a left lower lobe consolidation suggestive of pneumonia and a small pleural effusion. CT abdomen without significant ascites, had bladder wall thickening without kidney abnormalities. Had a leukocytosis > 14,000, sodium 131, creatinine 1.42, lactic acid of 2.6. urinalysis with significant hematuria and inflammatory changes. Admitted for sepsis due to HAP and UTI. Started on Linezolid and Zosyn.  Urine culture growing MSSA 1 set of blood cultures growing strep pneumo.  Linezolid was later discontinued and the patient was clinically improving on IV Zosyn.  However, her renal function continued to worsen even with the withholding of diuretics and a small NS bolus.  Nephrology was consulted.     Zosyn was switched to ceftriaxone given MSSA on 5/19/24. Given risk of C diff, switched to cefuroxime oral on 5/20/24.     Interval Problem Update  No acute events overnight.    No major complaints today from patient. Patient denies history of chest pain, shortness of breath.   She was noted later in the morning drowsy and sleepy, however mentation still intact with  Aox4.   Renal function remains diminished, nephrology advises to continue holding diuretics, and increase Na bicarb with calcium gluconate.   Gabapentin held given patient's drowsiness. Considered restarting lactulose, however given that it is not a medication that was at home, opted to monitor off gabapentin first and repeat labs in AM especially given risk of recurring diarrhea, and risk of ELIZABETH.     I have discussed this patient's plan of care and discharge plan at IDT rounds today with Case Management, Nursing, Nursing leadership, and other members of the IDT team.    Consultants/Specialty  Nephrology    Code Status  Full Code    Disposition  The patient is not medically cleared for discharge to home or a post-acute facility.      I have placed the appropriate orders for post-discharge needs.    Review of Systems  Review of Systems   Constitutional:  Negative for chills and fever.   Respiratory:  Negative for cough, sputum production, shortness of breath and wheezing.    Cardiovascular:  Negative for chest pain, palpitations, orthopnea and leg swelling.   Gastrointestinal:  Positive for abdominal pain. Negative for constipation, diarrhea, nausea and vomiting.        Physical Exam  Temp:  [36.5 °C (97.7 °F)-37.2 °C (99 °F)] 36.5 °C (97.7 °F)  Pulse:  [74-85] 78  Resp:  [16-18] 18  BP: ()/(56-78) 119/65  SpO2:  [93 %-97 %] 97 %    Physical Exam  Constitutional:       General: She is not in acute distress.     Appearance: She is not ill-appearing.   HENT:      Head: Normocephalic.      Mouth/Throat:      Mouth: Mucous membranes are dry.   Eyes:      General: No scleral icterus.        Right eye: No discharge.         Left eye: No discharge.   Cardiovascular:      Rate and Rhythm: Normal rate and regular rhythm.      Pulses: Normal pulses.      Heart sounds: Normal heart sounds.   Pulmonary:      Effort: Pulmonary effort is normal.      Breath sounds: Normal breath sounds.   Abdominal:      Palpations: Abdomen  is soft.      Tenderness: There is no guarding or rebound.      Comments: Mild to moderate lower abdominal tenderness to palpation improving since yesterday   Musculoskeletal:      Right lower leg: Edema present.      Comments: 1+ pitting edema of RLE  LLE BKA   Skin:     General: Skin is warm and dry.      Coloration: Skin is not jaundiced.   Neurological:      Mental Status: She is alert and oriented to person, place, and time. Mental status is at baseline.   Psychiatric:         Mood and Affect: Mood normal.          Fluids    Intake/Output Summary (Last 24 hours) at 5/20/2024 1728  Last data filed at 5/20/2024 1600  Gross per 24 hour   Intake 630 ml   Output 850 ml   Net -220 ml       Laboratory  Recent Labs     05/18/24  0117 05/19/24  0018 05/20/24  0027   WBC 7.6 5.8 5.2   RBC 3.08* 3.14* 3.09*   HEMOGLOBIN 8.3* 8.3* 8.2*   HEMATOCRIT 25.7* 25.8* 24.8*   MCV 83.4 82.2 80.3*   MCH 26.9* 26.4* 26.5*   MCHC 32.3 32.2 33.1   RDW 48.0 47.8 45.6   PLATELETCT 69* 118* 97*     Recent Labs     05/19/24  0018 05/19/24  1602 05/20/24  0027   SODIUM 122*  122* 121* 121*   POTASSIUM 5.5  5.5 5.1 4.8   CHLORIDE 94*  95* 93* 92*   CO2 14*  17* 15* 16*   GLUCOSE 112*  117* 131* 140*   BUN 44*  44* 47* 50*   CREATININE 2.94*  3.03* 3.34* 3.55*   CALCIUM 6.3*  6.1* 6.3* 6.4*     Recent Labs     05/18/24  0117   INR 2.34*               Imaging  CT-ABDOMEN-PELVIS WITH   Final Result         1. New left lower lobe consolidation and small left pleural effusion. This is likely due to pneumonia.   2. Cirrhosis, portal hypertension, and splenomegaly.   3. Atherosclerosis including coronary artery disease.   4. Ascites.   5. Possible colitis of the cecum.      DX-CHEST-PORTABLE (1 VIEW)   Final Result      Hypoinflation and cardiomegaly. Mild edema cannot be excluded.      US-ABDOMEN LTD (SOFT TISSUE)    (Results Pending)        Assessment/Plan:    * Sepsis (HCC)- (present on admission)  Assessment & Plan  This is Sepsis  Present on admission  SIRS criteria identified on my evaluation include: Fever, with temperature greater than 100.9 deg F, Tachycardia, with heart rate greater than 90 BPM, and Leukocytosis, with WBC greater than 12,000  Clinical indicators of end organ dysfunction include Lactic Acid greater than 2  Source is pulmonary and urinary.   Sepsis protocol initiated. Resuscitation volume of 2L ordered. Reason that resuscitation volume of less than 30ml/kg was ordered concern for fluid overload  -One set up blood cultures growing strep pneumo, continue to monitor   -Urine culture growing MSSA  -Transitioned to IV ceftriaxone from IV Zosyn (5/19-)    Drowsiness  Assessment & Plan  Possibly secondary to gabapentin. Held doses on 5/20/24, and to reassess.   Considered restarting lactulose, however given that it is not a medication that was at home, opted to monitor off gabapentin first and repeat labs in AM especially given risk of recurring diarrhea, and risk of ELIZABETH.     Bacteremia due to Streptococcus pneumoniae  Assessment & Plan  One set of blood cultures from 5/16 growing Streptococcus pneumoniae.  Likely secondary to pneumonia.  -Transitioned to IV ceftriaxone (5/19-). Received IV Zosyn from 5/16-5/18.   -No indication to repeat blood cultures unless of there is clinical deterioration or lack of improvement    Acute hypoxemic respiratory failure (HCC)  Assessment & Plan  Resolving  Secondary to pneumonia. Stable O2 needs, minimal.   - Encourage IS   - See plan for HAP     AF (atrial fibrillation) (HCC)  Assessment & Plan  Rate controlled.   - She does not appear to be on any rate controlling medications outpatient.  Continue to monitor on telemetry.  - Restarted Eliquis for stroke prophylaxis (5/19-)    Hematuria  Assessment & Plan  Resolving  -Restarted Eliquis for stroke prophylaxis (5/19-)        Hyperglycemia  Assessment & Plan  Per chart review, it was noted the patient has a history of type 2 diabetes, but patient  is now at prediabetes range.   - CTM fasting BG     Hospital-acquired pneumonia  Assessment & Plan  Patient with recent discharge from hospital several days ago prior to admission.  Presented with a Tmax of 103.6, tachycardia, leukocytosis, a retrocardiac consolidation and positive procalcitonin. Minimal o2 requirements.   CT imaging -- New left lower lobe consolidation and small left pleural effusion.   Has hx of pseudomonas in her prior infected joint (s/p BKA), not isolated in blood, urine  or sputum priorly. Hx of ESBL in urine.   - Transitioned to IV ceftriaxone from IV Zosyn (5/19-)  - One set of blood culture growing strep pneumo likely 2/2 pneumonia   - O2 per protocol - encourage IS    Nausea & vomiting- (present on admission)  Assessment & Plan  IV and PO antiemetics     Lactic acid acidosis- (present on admission)  Assessment & Plan  Resolved  2/2 sepsis.     Normocytic anemia- (present on admission)  Assessment & Plan  Borderline normocytic/microcytic. Iron panel revealed significantly low iron/TIBC. Ferritin is wnl although this means it is likely low given it is an acute phase reactant. As such, this is likely RODOLFO.  -Will defer iron supplementation during acute infectious process  -Will likely need GI consultation at discharge for further workup/procedures     Hyponatremia- (present on admission)  Assessment & Plan  Hypervolemic on exam after NS bolus on 5/18 likely due to third-spacing of fluid from significant hypoalbuminemia.  Additionally, patient's sodium worsened after an NS infusion.  Will try fluid restriction per nephrology recommendations.  Urine sodium <20, urine osmolality 330.  -Nephrology consulted, appreciate recommendations  -1.2 L fluid restriction  -Repeat BMP ordered for 1600    Acute cystitis with hematuria- (present on admission)  Assessment & Plan  Patient stated that she noticed some blood in urine over the last several days prior to admission, along with suprapubic pain. Bladder  wall thickening in CT without kidney abnormalities.   Concern for UTI - hx of ESBL in urine x2 S to zosyn.   - Urine cultures growing MSSA  - Transitioned to IV ceftriaxone from IV Zosyn (5/19-)    Acute kidney injury (HCC)- (present on admission)  Assessment & Plan  Likely prerenal in the setting of infection, diuretic use and hypotension. She additionally received contrast upon admission.  FeNa indicated prerenal process.  UA from 5/19 demonstrated hyaline casts once again suggestive of prerenal ELIZABETH from volume depletion.  - Holding home dose Lasix, Aldactone, ACE/ARB  - Nephrology consulted, appreciate recommendations  - Started on sodium bicarb 1300mg TID and Lokelma, increased sodium bicarb to 1950 TID with additional IV bicarb.   - Monitor strictly for ins and outs  - Repeat CHEM panel in a.m.    Alcoholic cirrhosis of liver with ascites (HCC)- (present on admission)  Assessment & Plan  CHILD class B.   Patient has a positive HCV antibody but negative RNA/NAAT - resolved infection.   Recent hospitalization for cirrhosis decompensation with anasarca. No paracentesis needed. No hx of GIB/EV.   No concern for acute decompensation, bedside ultrasound on 5/18 revealed minimal ascites.  -Discontinued lactulose on 5/18 due to multiple episodes of diarrhea and volume depletion  - Holding aldactone and lasix   - Monitor I and Os strictly         VTE prophylaxis: Eliquis     I have performed a physical exam and reviewed and updated ROS and Plan today (5/20/2024). In review of yesterday's note (5/19/2024), there are no changes except as documented above.

## 2024-05-20 NOTE — CARE PLAN
The patient is Stable - Low risk of patient condition declining or worsening    Shift Goals  Clinical Goals: pain management, electrolyte replacement, iv abx  Patient Goals: Get better, go home, rest  Family Goals: EDMUND    Progress made toward(s) clinical / shift goals:        Problem: Skin Integrity  Goal: Skin integrity is maintained or improved  Description: Target End Date:  Prior to discharge or change in level of care    Document interventions on Skin Risk/Sincere flowsheet groups and corresponding LDA    1.  Assess and monitor skin integrity, appearance and/or temperature  2.  Assess risk factors for impaired skin integrity and/or pressures ulcers  3.  Implement precautions to protect skin integrity in collaboration with interdisciplinary team  4.  Implement pressure ulcer prevention protocol if at risk for skin breakdown  5.  Confirm wound care consult if at risk for skin breakdown  6.  Ensure patient use of pressure relieving devices  (Low air loss bed, waffle overlay, heel protectors, ROHO cushion, etc)  Outcome: Progressing     Problem: Fall Risk  Goal: Patient will remain free from falls  Description: Target End Date:  Prior to discharge or change in level of care    Document interventions on the Juliana Dickey Fall Risk Assessment    1.  Assess for fall risk factors  2.  Implement fall precautions  Outcome: Progressing

## 2024-05-21 LAB
AMMONIA PLAS-SCNC: 44 UMOL/L (ref 11–45)
ANION GAP SERPL CALC-SCNC: 12 MMOL/L (ref 7–16)
BACTERIA BLD CULT: NORMAL
BACTERIA SPEC RESP CULT: ABNORMAL
BACTERIA SPEC RESP CULT: ABNORMAL
BASOPHILS # BLD AUTO: 0.7 % (ref 0–1.8)
BASOPHILS # BLD: 0.05 K/UL (ref 0–0.12)
BUN SERPL-MCNC: 53 MG/DL (ref 8–22)
CALCIUM SERPL-MCNC: 6.7 MG/DL (ref 8.5–10.5)
CHLORIDE SERPL-SCNC: 96 MMOL/L (ref 96–112)
CO2 SERPL-SCNC: 19 MMOL/L (ref 20–33)
CREAT SERPL-MCNC: 3.32 MG/DL (ref 0.5–1.4)
EOSINOPHIL # BLD AUTO: 0.2 K/UL (ref 0–0.51)
EOSINOPHIL NFR BLD: 2.7 % (ref 0–6.9)
ERYTHROCYTE [DISTWIDTH] IN BLOOD BY AUTOMATED COUNT: 45.8 FL (ref 35.9–50)
ETEST SENSITIVITY ETEST: NORMAL
GFR SERPLBLD CREATININE-BSD FMLA CKD-EPI: 15 ML/MIN/1.73 M 2
GLUCOSE SERPL-MCNC: 163 MG/DL (ref 65–99)
GRAM STN SPEC: ABNORMAL
HCT VFR BLD AUTO: 24.9 % (ref 37–47)
HGB BLD-MCNC: 8.2 G/DL (ref 12–16)
IMM GRANULOCYTES # BLD AUTO: 0.06 K/UL (ref 0–0.11)
IMM GRANULOCYTES NFR BLD AUTO: 0.8 % (ref 0–0.9)
LYMPHOCYTES # BLD AUTO: 0.98 K/UL (ref 1–4.8)
LYMPHOCYTES NFR BLD: 13.2 % (ref 22–41)
MCH RBC QN AUTO: 26.3 PG (ref 27–33)
MCHC RBC AUTO-ENTMCNC: 32.9 G/DL (ref 32.2–35.5)
MCV RBC AUTO: 79.8 FL (ref 81.4–97.8)
MONOCYTES # BLD AUTO: 0.6 K/UL (ref 0–0.85)
MONOCYTES NFR BLD AUTO: 8.1 % (ref 0–13.4)
NEUTROPHILS # BLD AUTO: 5.54 K/UL (ref 1.82–7.42)
NEUTROPHILS NFR BLD: 74.5 % (ref 44–72)
NRBC # BLD AUTO: 0 K/UL
NRBC BLD-RTO: 0 /100 WBC (ref 0–0.2)
PLATELET # BLD AUTO: 115 K/UL (ref 164–446)
PMV BLD AUTO: 10.5 FL (ref 9–12.9)
POTASSIUM SERPL-SCNC: 4.4 MMOL/L (ref 3.6–5.5)
RBC # BLD AUTO: 3.12 M/UL (ref 4.2–5.4)
SIGNIFICANT IND 70042: ABNORMAL
SIGNIFICANT IND 70042: NORMAL
SITE SITE: ABNORMAL
SITE SITE: NORMAL
SODIUM SERPL-SCNC: 127 MMOL/L (ref 135–145)
SOURCE SOURCE: ABNORMAL
SOURCE SOURCE: NORMAL
WBC # BLD AUTO: 7.4 K/UL (ref 4.8–10.8)

## 2024-05-21 PROCEDURE — 85025 COMPLETE CBC W/AUTO DIFF WBC: CPT

## 2024-05-21 PROCEDURE — 700102 HCHG RX REV CODE 250 W/ 637 OVERRIDE(OP)

## 2024-05-21 PROCEDURE — A9270 NON-COVERED ITEM OR SERVICE: HCPCS | Performed by: STUDENT IN AN ORGANIZED HEALTH CARE EDUCATION/TRAINING PROGRAM

## 2024-05-21 PROCEDURE — 700102 HCHG RX REV CODE 250 W/ 637 OVERRIDE(OP): Performed by: INTERNAL MEDICINE

## 2024-05-21 PROCEDURE — 700111 HCHG RX REV CODE 636 W/ 250 OVERRIDE (IP): Mod: JZ | Performed by: INTERNAL MEDICINE

## 2024-05-21 PROCEDURE — 82140 ASSAY OF AMMONIA: CPT

## 2024-05-21 PROCEDURE — A9270 NON-COVERED ITEM OR SERVICE: HCPCS

## 2024-05-21 PROCEDURE — A9270 NON-COVERED ITEM OR SERVICE: HCPCS | Performed by: INTERNAL MEDICINE

## 2024-05-21 PROCEDURE — 770020 HCHG ROOM/CARE - TELE (206)

## 2024-05-21 PROCEDURE — 700102 HCHG RX REV CODE 250 W/ 637 OVERRIDE(OP): Performed by: STUDENT IN AN ORGANIZED HEALTH CARE EDUCATION/TRAINING PROGRAM

## 2024-05-21 PROCEDURE — 36415 COLL VENOUS BLD VENIPUNCTURE: CPT

## 2024-05-21 PROCEDURE — 80048 BASIC METABOLIC PNL TOTAL CA: CPT

## 2024-05-21 PROCEDURE — 99232 SBSQ HOSP IP/OBS MODERATE 35: CPT | Mod: GC | Performed by: INTERNAL MEDICINE

## 2024-05-21 RX ORDER — VITAMIN B COMPLEX
1000 TABLET ORAL DAILY
Status: DISCONTINUED | OUTPATIENT
Start: 2024-05-21 | End: 2024-05-25 | Stop reason: HOSPADM

## 2024-05-21 RX ORDER — ACETAMINOPHEN 325 MG/1
650 TABLET ORAL EVERY 6 HOURS PRN
Status: DISCONTINUED | OUTPATIENT
Start: 2024-05-21 | End: 2024-05-24

## 2024-05-21 RX ORDER — CALCIUM GLUCONATE 20 MG/ML
1 INJECTION, SOLUTION INTRAVENOUS ONCE
Status: COMPLETED | OUTPATIENT
Start: 2024-05-21 | End: 2024-05-21

## 2024-05-21 RX ORDER — CALCIUM CARBONATE 500(1250)
500 TABLET ORAL 2 TIMES DAILY WITH MEALS
Status: DISCONTINUED | OUTPATIENT
Start: 2024-05-21 | End: 2024-05-22

## 2024-05-21 RX ORDER — LACTULOSE 20 G/30ML
30 SOLUTION ORAL 3 TIMES DAILY
Status: DISCONTINUED | OUTPATIENT
Start: 2024-05-21 | End: 2024-05-25 | Stop reason: HOSPADM

## 2024-05-21 RX ADMIN — SODIUM BICARBONATE 1950 MG: 650 TABLET ORAL at 19:18

## 2024-05-21 RX ADMIN — Medication 667 MG: at 08:43

## 2024-05-21 RX ADMIN — CALCIUM GLUCONATE 1 G: 20 INJECTION, SOLUTION INTRAVENOUS at 09:24

## 2024-05-21 RX ADMIN — CALCIUM 500 MG: 500 TABLET ORAL at 19:19

## 2024-05-21 RX ADMIN — APIXABAN 5 MG: 5 TABLET, FILM COATED ORAL at 05:38

## 2024-05-21 RX ADMIN — Medication 1000 UNITS: at 12:40

## 2024-05-21 RX ADMIN — OMEPRAZOLE 40 MG: 20 CAPSULE, DELAYED RELEASE ORAL at 05:38

## 2024-05-21 RX ADMIN — SODIUM BICARBONATE 1950 MG: 650 TABLET ORAL at 05:38

## 2024-05-21 RX ADMIN — Medication 667 MG: at 19:19

## 2024-05-21 RX ADMIN — CEFUROXIME AXETIL 250 MG: 250 TABLET, FILM COATED ORAL at 16:24

## 2024-05-21 RX ADMIN — OXYCODONE 5 MG: 5 TABLET ORAL at 19:23

## 2024-05-21 RX ADMIN — SODIUM ZIRCONIUM CYCLOSILICATE 10 G: 10 POWDER, FOR SUSPENSION ORAL at 12:40

## 2024-05-21 RX ADMIN — ATORVASTATIN CALCIUM 20 MG: 20 TABLET, FILM COATED ORAL at 20:19

## 2024-05-21 RX ADMIN — LACTULOSE 30 ML: 20 SOLUTION ORAL at 12:40

## 2024-05-21 RX ADMIN — CEFUROXIME AXETIL 250 MG: 250 TABLET, FILM COATED ORAL at 05:38

## 2024-05-21 RX ADMIN — RIFAXIMIN 550 MG: 550 TABLET ORAL at 12:40

## 2024-05-21 RX ADMIN — APIXABAN 5 MG: 5 TABLET, FILM COATED ORAL at 16:25

## 2024-05-21 RX ADMIN — Medication 667 MG: at 12:39

## 2024-05-21 RX ADMIN — LACTULOSE 30 ML: 20 SOLUTION ORAL at 19:19

## 2024-05-21 RX ADMIN — SODIUM BICARBONATE 1950 MG: 650 TABLET ORAL at 12:40

## 2024-05-21 RX ADMIN — ACETAMINOPHEN 650 MG: 325 TABLET, FILM COATED ORAL at 12:40

## 2024-05-21 RX ADMIN — CALCIUM 500 MG: 500 TABLET ORAL at 09:24

## 2024-05-21 ASSESSMENT — ENCOUNTER SYMPTOMS
FEVER: 0
ORTHOPNEA: 0
CHILLS: 0
DIARRHEA: 0
ABDOMINAL PAIN: 1
VOMITING: 0
WHEEZING: 0
SHORTNESS OF BREATH: 0
NAUSEA: 0
COUGH: 0
CONSTIPATION: 0
PALPITATIONS: 0
SPUTUM PRODUCTION: 0

## 2024-05-21 ASSESSMENT — PAIN DESCRIPTION - PAIN TYPE: TYPE: ACUTE PAIN

## 2024-05-21 ASSESSMENT — FIBROSIS 4 INDEX: FIB4 SCORE: 2.78

## 2024-05-21 NOTE — DIETARY
Nutrition Update:    Day 5 of admit.  April Alicia is a 64 y.o. female with admitting DX of Sepsis     Patient being followed to optimize nutrition.    Current Diet: Regular with 1200 mL fluid restriction    Problem: Nutritional:  Goal: Achieve adequate nutritional intake  Description: Patient will consume 50% of meals  Outcome: Met    PO intake 50% or > per ADL flow sheet.    RD following per dept guidelines.

## 2024-05-21 NOTE — ASSESSMENT & PLAN NOTE
Possibly secondary to gabapentin. Held doses on 5/20/24, and to reassess. Considered restarting lactulose, however given that it is not a medication that was at home, opted to monitor off gabapentin first and repeat labs in AM especially given risk of recurring diarrhea, and risk of ELIZABETH. The following day on 5/21/24 patient still drowsy, Seems that gabapentin not the culprit of drowsiness, patient may have early symptoms of hepatic encephalopathy, and therefore rifaximin and lactulose was started today. Ammonia collected.   -ammonia  -Lactulose per protocol to titrate to 2 bowel movements per day  -rifaximin oral

## 2024-05-21 NOTE — CARE PLAN
The patient is Stable - Low risk of patient condition declining or worsening    Shift Goals  Clinical Goals: [id number], pain management, IV ABX, monitor I/O  Patient Goals: go home  Family Goals: gisele    Progress made toward(s) clinical / shift goals:    Problem: Pain - Standard  Goal: Alleviation of pain or a reduction in pain to the patient’s comfort goal  Description: Target End Date:  Prior to discharge or change in level of care    Document on Vitals flowsheet    1.  Document pain using the appropriate pain scale per order or unit policy  2.  Educate and implement non-pharmacologic comfort measures (i.e. relaxation, distraction, massage, cold/heat therapy, etc.)  3.  Pain management medications as ordered  4.  Reassess pain after pain med administration per policy  5.  If opiods administered assess patient's response to pain medication is appropriate per POSS sedation scale  6.  Follow pain management plan developed in collaboration with patient and interdisciplinary team (including palliative care or pain specialists if applicable)  Outcome: Progressing     Problem: Skin Integrity  Goal: Skin integrity is maintained or improved  Description: Target End Date:  Prior to discharge or change in level of care    Document interventions on Skin Risk/Sincere flowsheet groups and corresponding LDA    1.  Assess and monitor skin integrity, appearance and/or temperature  2.  Assess risk factors for impaired skin integrity and/or pressures ulcers  3.  Implement precautions to protect skin integrity in collaboration with interdisciplinary team  4.  Implement pressure ulcer prevention protocol if at risk for skin breakdown  5.  Confirm wound care consult if at risk for skin breakdown  6.  Ensure patient use of pressure relieving devices  (Low air loss bed, waffle overlay, heel protectors, ROHO cushion, etc)  Outcome: Progressing       Patient is not progressing towards the following goals:

## 2024-05-21 NOTE — THERAPY
Physical Therapy   Daily Treatment     Patient Name: April Alicia  Age:  64 y.o., Sex:  female  Medical Record #: [id number]  Today's Date: 5/20/2024     Precautions  Precautions: Fall Risk  Comments: L AKA    Assessment  Pt seen for PT tx session with mobility detailed down below. Pt presents with increased weakness and fatigue as compared to eval. Pt is now at Max A for stand pivot transfers and needs consistent cues for safe mobility. Recommend post acute placement to maximize functional independence and decrease caregiver burden. Will follow.     Plan    Treatment Plan Status: Continue Current Treatment Plan  Type of Treatment: Bed Mobility, Neuro Re-Education / Balance, Self Care / Home Evaluation, Therapeutic Activities, Therapeutic Exercise, Stair Training  Treatment Frequency: 3 Times per Week  Treatment Duration: Until Therapy Goals Met    DC Equipment Recommendations: Unable to determine at this time  Discharge Recommendations: Recommend post-acute placement for additional physical therapy services prior to discharge home        Vitals   O2 (LPM) 0   O2 Delivery Device None - Room Air   Pain 0 - 10 Group   Location Leg   Location Orientation Left;Distal   Description Aching   Therapist Pain Assessment During Activity   Sitting Lower Body Exercises   Sitting Lower Body Exercises Yes   Long Arc Quad 2 sets of 10;Right   Balance   Sitting Balance (Static) Fair   Sitting Balance (Dynamic) Fair -   Standing Balance (Static) Poor -   Standing Balance (Dynamic) Trace +   Weight Shift Sitting Fair   Skilled Intervention Verbal Cuing;Tactile Cuing;Sequencing   Comments HHA x2 in standing, pt with worse standing balance today and needing constant assist during transfers   Bed Mobility    Supine to Sit Standby Assist   Sit to Supine Standby Assist   Scooting Standby Assist   Skilled Intervention Verbal Cuing   Comments HOB elevated   Gait Analysis   Gait Level Of Assist Unable to Participate   Functional  Mobility   Sit to Stand Maximal Assist   Bed, Chair, Wheelchair Transfer Maximal Assist   Transfer Method Stand Pivot   Mobility eob<>chair   Skilled Intervention Verbal Cuing;Tactile Cuing;Sequencing;Postural Facilitation   Comments pt needing 2 person assist for transfer back to bed due to weakness   6 Clicks Assessment - How much HELP from from another person do you currently need... (If the patient hasn't done an activity recently, how much help from another person do you think he/she would need if he/she tried?)   Turning from your back to your side while in a flat bed without using bedrails? 4   Moving from lying on your back to sitting on the side of a flat bed without using bedrails? 3   Moving to and from a bed to a chair (including a wheelchair)? 2   Standing up from a chair using your arms (e.g., wheelchair, or bedside chair)? 2   Walking in hospital room? 1   Climbing 3-5 steps with a railing? 1   6 clicks Mobility Score 13   Short Term Goals    Short Term Goal # 1 pt will complete spt with spv in 6 tx for functional mobility.   Goal Outcome # 1 goal not met   Short Term Goal # 2 pt will propel self 150 ft in wc with spv in 6 tx for household distances.   Goal Outcome # 2 Goal not met   Physical Therapy Treatment Plan   Physical Therapy Treatment Plan Continue Current Treatment Plan   Anticipated Discharge Equipment and Recommendations   DC Equipment Recommendations Unable to determine at this time   Discharge Recommendations Recommend post-acute placement for additional physical therapy services prior to discharge home   Interdisciplinary Plan of Care Collaboration   IDT Collaboration with  Nursing   Patient Position at End of Therapy In Bed;Bed Alarm On;Call Light within Reach;Tray Table within Reach;Phone within Reach   Collaboration Comments RN updated   Session Information   Date / Session Number  5/20- 2 (2/3, 5/23)

## 2024-05-21 NOTE — CARE PLAN
Problem: Knowledge Deficit - Standard  Goal: Patient and family/care givers will demonstrate understanding of plan of care, disease process/condition, diagnostic tests and medications  Outcome: Progressing     Problem: Skin Integrity  Goal: Skin integrity is maintained or improved  Outcome: Progressing   The patient is Stable - Low risk of patient condition declining or worsening    Shift Goals  Clinical Goals: [id number], pain management, IV ABX, monitor I/O  Patient Goals: go home  Family Goals: gisele

## 2024-05-21 NOTE — PROGRESS NOTES
[facility] Internal Medicine Daily Progress Note    Date of Service  5/21/2024    R Team: R IM Blue Team   Attending: Eric Viera M.d.  Senior Resident: Dr. Schulte  Intern:  Dr. Richards  Contact Number: [phone]    Chief Complaint  April Alicia is a 64 y.o. female admitted 5/16/2024 with vaginal bleeding/hematuria    Hospital Course  April Alicia is a 64 y.o. 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 She presented 5/16/2024 with fevers, chills, hematuria, and shortness of breath. In the ER, she was tachycardic, febrile, requiring 1-2 lt O2. CT imaging showed a left lower lobe consolidation suggestive of pneumonia and a small pleural effusion. CT abdomen without significant ascites, had bladder wall thickening without kidney abnormalities. Had a leukocytosis > 14,000, sodium 131, creatinine 1.42, lactic acid of 2.6. urinalysis with significant hematuria and inflammatory changes. Admitted for sepsis due to HAP and UTI. Started on Linezolid and Zosyn.  Urine culture growing MSSA 1 set of blood cultures growing strep pneumo.  Linezolid was later discontinued and the patient was clinically improving on IV Zosyn.  However, her renal function continued to worsen even with the withholding of diuretics and a small NS bolus.  Nephrology was consulted.     Zosyn was switched to ceftriaxone given MSSA on 5/19/24. Given risk of C diff, switched to cefuroxime oral on 5/20/24.     Interval Problem Update  No acute events overnight.    Patient is still drowsy and sleepy, is awake to voice/light touch, mentation still intact with Aox4.   Renal function remains diminished, nephrology advises to continue holding diuretics,  continue lokelma, bicarb supplements, phoslo and added IV calcium gluconate twice daily.   Seems that gabapentin not the culprit of drowsiness, patient may have early symptoms of hepatic encephalopathy, and therefore rifaximin and lactulose was started today. Ammonia collected.     I have discussed this patient's plan of care and discharge plan at IDT rounds today with Case Management, Nursing, Nursing leadership, and other members of the IDT team.    Consultants/Specialty  Nephrology    Code Status  Full Code    Disposition  The patient is not medically cleared for discharge to home or a post-acute facility.      I have placed the appropriate orders for post-discharge needs.    Review of Systems  Review of Systems   Constitutional:  Negative for chills and fever.   Respiratory:  Negative for cough, sputum production, shortness of breath and wheezing.    Cardiovascular:  Negative for chest pain, palpitations, orthopnea and leg swelling.   Gastrointestinal:  Positive for abdominal pain. Negative for constipation, diarrhea, nausea and vomiting.        Physical Exam  Temp:  [36.4 °C (97.5 °F)-37.2 °C (99 °F)] 36.4 °C (97.5 °F)  Pulse:  [] 76  Resp:  [18] 18  BP: (106-119)/(60-75) 113/72  SpO2:  [93 %-97 %] 94 %    Physical Exam  Constitutional:       General: She is not in acute distress.     Appearance: She is not ill-appearing.   HENT:      Head: Normocephalic.      Mouth/Throat:      Mouth: Mucous membranes are dry.   Eyes:      General: No scleral icterus.        Right eye: No discharge.         Left eye: No discharge.   Cardiovascular:      Rate and Rhythm: Normal rate and regular rhythm.      Pulses: Normal pulses.      Heart sounds: Normal heart sounds.   Pulmonary:      Effort: Pulmonary effort is normal.      Breath sounds: Normal breath sounds.   Abdominal:      Palpations: Abdomen is soft.      Tenderness: There is no guarding or rebound.      Comments: Mild to moderate lower abdominal tenderness to  palpation improving since yesterday   Musculoskeletal:      Right lower leg: Edema present.      Comments: 1+ pitting edema of RLE  LLE BKA   Skin:     General: Skin is warm and dry.      Coloration: Skin is not jaundiced.   Neurological:      Mental Status: She is alert and oriented to person, place, and time. Mental status is at baseline.   Psychiatric:         Mood and Affect: Mood normal.          Fluids    Intake/Output Summary (Last 24 hours) at 5/21/2024 1302  Last data filed at 5/21/2024 1200  Gross per 24 hour   Intake 760 ml   Output 1425 ml   Net -665 ml       Laboratory  Recent Labs     05/19/24  0018 05/20/24  0027 05/21/24  0350   WBC 5.8 5.2 7.4   RBC 3.14* 3.09* 3.12*   HEMOGLOBIN 8.3* 8.2* 8.2*   HEMATOCRIT 25.8* 24.8* 24.9*   MCV 82.2 80.3* 79.8*   MCH 26.4* 26.5* 26.3*   MCHC 32.2 33.1 32.9   RDW 47.8 45.6 45.8   PLATELETCT 118* 97* 115*   MPV  --   --  10.5     Recent Labs     05/19/24  1602 05/20/24  0027 05/21/24  0350   SODIUM 121* 121* 127*   POTASSIUM 5.1 4.8 4.4   CHLORIDE 93* 92* 96   CO2 15* 16* 19*   GLUCOSE 131* 140* 163*   BUN 47* 50* 53*   CREATININE 3.34* 3.55* 3.32*   CALCIUM 6.3* 6.4* 6.7*                     Imaging  US-ABDOMEN LTD (SOFT TISSUE)   Final Result      Small amount of ascites in the abdomen.      CT-ABDOMEN-PELVIS WITH   Final Result         1. New left lower lobe consolidation and small left pleural effusion. This is likely due to pneumonia.   2. Cirrhosis, portal hypertension, and splenomegaly.   3. Atherosclerosis including coronary artery disease.   4. Ascites.   5. Possible colitis of the cecum.      DX-CHEST-PORTABLE (1 VIEW)   Final Result      Hypoinflation and cardiomegaly. Mild edema cannot be excluded.           Assessment/Plan:    * Sepsis (HCC)- (present on admission)  Assessment & Plan  This is Sepsis Present on admission  SIRS criteria identified on my evaluation include: Fever, with temperature greater than 100.9 deg F, Tachycardia, with heart rate  greater than 90 BPM, and Leukocytosis, with WBC greater than 12,000  Clinical indicators of end organ dysfunction include Lactic Acid greater than 2  Source is pulmonary and urinary.   Sepsis protocol initiated. Resuscitation volume of 2L ordered. Reason that resuscitation volume of less than 30ml/kg was ordered concern for fluid overload  IV antibiotics as appropriate for source of sepsis - on Linezolid and Zosyn given concern for HAP and UTI - see individual plans   Urine and blood cultures pending     Acute hypoxemic respiratory failure (HCC)  Assessment & Plan  Secondary to pneumonia. Stable O2 needs, minimal.   - Encourage IS   - See plan for HAP     AF (atrial fibrillation) ([facility])  Assessment & Plan  Rate controlled.   - Continue home dose metoprolol.    - Holding apixaban in the setting of hematuria      Hematuria  Assessment & Plan  Likely in the setting of UTI. Confirmed not vaginal bleeding - hx of histerecetomy.   - Holding home dose apixaban for today and reevaluate as infections clears   - If no resolution will need outpt urology    Hyperglycemia  Assessment & Plan  Per chart review, it was noted the patient has a history of type 2 diabetes, but patient is now at prediabetes range.   - CTM fasting BG     Hospital-acquired pneumonia  Assessment & Plan  Patient with recent discharge from the [facility] several days ago.  Now presenting with a Tmax of 103.6, tachycardia, leukocytosis, a retrocardiac consolidation and positive procalcitonin. Minimal o2 requirements.   CT imaging -- New left lower lobe consolidation and small left pleural effusion.   Has hx of pseudomonas in her prior infected joint (s/p BKA), not isolated in blood, urine  or sputum priorly. Hx of ESBL in urine.   Empirically covering with linezolid and Zosyn IV  - MRSA nares pending, if negative dc Linezolid   - On Zosyn also covering for urinary source   - Follow-up cultures  - O2 per protocol - encourage IS    Nausea &  vomiting- (present on admission)  Assessment & Plan  IV and PO antiemetics     Lactic acid acidosis- (present on admission)  Assessment & Plan  Due to sepsis. Resolved.     Normocytic anemia- (present on admission)  Assessment & Plan  Prior iron studies was suggestive of iron deficiency anemia   - Monitor BM for melena - describes possible hx of melena  - Recheck anemia w/u    Hyponatremia- (present on admission)  Assessment & Plan  Likely hypovolemic - secondary to Lasix and her diuretics, also poor PO intake.  - Encouraging PO intake, dc fluids  - CTM    Acute cystitis with hematuria- (present on admission)  Assessment & Plan  Patient states that she is noticed some blood in urine over the last several days, along with suprapubic pain. Bladder wall thickening in CT without kidney abnormalities.   Concern for UTI - hx of ESBL in urine x2 S to zosyn.   - Continue Zosyn for now   - Follow urine cultures     Acute kidney injury (HCC)- (present on admission)  Assessment & Plan  Creatinine level on admission was 1.42, BUN/Cr is 15 but stills suspect prerenal in the setting of sepsis and prior diuretic use, there is no evidence of cirrhosis decompensation at this time suggesting hepatorenal etiology.   - Holding home dose Lasix, Aldactone, ACE/ARB  - Stopped IVF, continue encouraging PO fluids as patient is tolerating well   - Monitor     Alcoholic cirrhosis of liver with ascites (HCC)- (present on admission)  Assessment & Plan  CHILD class B.   Patient has a positive HCV antibody but negative RNA/NAAT - resolved infection.   Recent hospitalization for cirrhosis decompensation with anasarca. No paracentesis needed. No hx of GIB/EV.   No concern for acute decompensation, no ascites/minimal seen in CT this time.   - Monitor for lactulose needs  - Holding aldactone and lasix   - Monitor I and Os strictly         VTE prophylaxis: Eliquis     I have performed a physical exam and reviewed and updated ROS and Plan today  (5/21/2024). In review of yesterday's note (5/20/2024), there are no changes except as documented above.    Physical Exam  Constitutional:       General: She is not in acute distress.     Appearance: She is not ill-appearing.   HENT:      Head: Normocephalic.      Mouth/Throat:      Mouth: Mucous membranes are dry.   Eyes:      General: No scleral icterus.        Right eye: No discharge.         Left eye: No discharge.   Cardiovascular:      Rate and Rhythm: Normal rate and regular rhythm.      Pulses: Normal pulses.      Heart sounds: Normal heart sounds.   Pulmonary:      Effort: Pulmonary effort is normal.      Breath sounds: Normal breath sounds.   Abdominal:      Palpations: Abdomen is soft.      Tenderness: There is no guarding or rebound.      Comments: Mild to moderate lower abdominal tenderness to palpation improving since yesterday   Musculoskeletal:      Right lower leg: Edema present.      Comments: 1+ pitting edema of RLE  LLE BKA   Skin:     General: Skin is warm and dry.      Coloration: Skin is not jaundiced.   Neurological:      Mental Status: She is alert and oriented to person, place, and time. Mental status is at baseline.   Psychiatric:         Mood and Affect: Mood normal.

## 2024-05-22 PROBLEM — R53.81 PHYSICAL DECONDITIONING: Status: ACTIVE | Noted: 2024-05-22

## 2024-05-22 LAB
ALBUMIN SERPL BCP-MCNC: 1.8 G/DL (ref 3.2–4.9)
ANION GAP SERPL CALC-SCNC: 10 MMOL/L (ref 7–16)
BASOPHILS # BLD AUTO: 0.8 % (ref 0–1.8)
BASOPHILS # BLD: 0.05 K/UL (ref 0–0.12)
BUN SERPL-MCNC: 49 MG/DL (ref 8–22)
CALCIUM ALBUM COR SERPL-MCNC: 9 MG/DL (ref 8.5–10.5)
CALCIUM SERPL-MCNC: 7.2 MG/DL (ref 8.5–10.5)
CHLORIDE SERPL-SCNC: 99 MMOL/L (ref 96–112)
CO2 SERPL-SCNC: 21 MMOL/L (ref 20–33)
CREAT SERPL-MCNC: 2.9 MG/DL (ref 0.5–1.4)
EOSINOPHIL # BLD AUTO: 0.29 K/UL (ref 0–0.51)
EOSINOPHIL NFR BLD: 4.6 % (ref 0–6.9)
ERYTHROCYTE [DISTWIDTH] IN BLOOD BY AUTOMATED COUNT: 45.5 FL (ref 35.9–50)
GFR SERPLBLD CREATININE-BSD FMLA CKD-EPI: 18 ML/MIN/1.73 M 2
GLUCOSE SERPL-MCNC: 158 MG/DL (ref 65–99)
HCT VFR BLD AUTO: 25.7 % (ref 37–47)
HGB BLD-MCNC: 8.6 G/DL (ref 12–16)
IMM GRANULOCYTES # BLD AUTO: 0.05 K/UL (ref 0–0.11)
IMM GRANULOCYTES NFR BLD AUTO: 0.8 % (ref 0–0.9)
LYMPHOCYTES # BLD AUTO: 1.24 K/UL (ref 1–4.8)
LYMPHOCYTES NFR BLD: 19.6 % (ref 22–41)
MCH RBC QN AUTO: 26.4 PG (ref 27–33)
MCHC RBC AUTO-ENTMCNC: 33.5 G/DL (ref 32.2–35.5)
MCV RBC AUTO: 78.8 FL (ref 81.4–97.8)
MONOCYTES # BLD AUTO: 0.62 K/UL (ref 0–0.85)
MONOCYTES NFR BLD AUTO: 9.8 % (ref 0–13.4)
NEUTROPHILS # BLD AUTO: 4.08 K/UL (ref 1.82–7.42)
NEUTROPHILS NFR BLD: 64.4 % (ref 44–72)
NRBC # BLD AUTO: 0 K/UL
NRBC BLD-RTO: 0 /100 WBC (ref 0–0.2)
PHOSPHATE SERPL-MCNC: 4.4 MG/DL (ref 2.5–4.5)
PLATELET # BLD AUTO: 140 K/UL (ref 164–446)
PMV BLD AUTO: 11.4 FL (ref 9–12.9)
POTASSIUM SERPL-SCNC: 4 MMOL/L (ref 3.6–5.5)
RBC # BLD AUTO: 3.26 M/UL (ref 4.2–5.4)
SODIUM SERPL-SCNC: 130 MMOL/L (ref 135–145)
WBC # BLD AUTO: 6.3 K/UL (ref 4.8–10.8)

## 2024-05-22 PROCEDURE — 36415 COLL VENOUS BLD VENIPUNCTURE: CPT

## 2024-05-22 PROCEDURE — A9270 NON-COVERED ITEM OR SERVICE: HCPCS | Performed by: INTERNAL MEDICINE

## 2024-05-22 PROCEDURE — 99232 SBSQ HOSP IP/OBS MODERATE 35: CPT | Mod: GC | Performed by: INTERNAL MEDICINE

## 2024-05-22 PROCEDURE — 700102 HCHG RX REV CODE 250 W/ 637 OVERRIDE(OP): Performed by: STUDENT IN AN ORGANIZED HEALTH CARE EDUCATION/TRAINING PROGRAM

## 2024-05-22 PROCEDURE — A9270 NON-COVERED ITEM OR SERVICE: HCPCS

## 2024-05-22 PROCEDURE — 700102 HCHG RX REV CODE 250 W/ 637 OVERRIDE(OP)

## 2024-05-22 PROCEDURE — 770020 HCHG ROOM/CARE - TELE (206)

## 2024-05-22 PROCEDURE — 80069 RENAL FUNCTION PANEL: CPT

## 2024-05-22 PROCEDURE — A9270 NON-COVERED ITEM OR SERVICE: HCPCS | Performed by: STUDENT IN AN ORGANIZED HEALTH CARE EDUCATION/TRAINING PROGRAM

## 2024-05-22 PROCEDURE — 97530 THERAPEUTIC ACTIVITIES: CPT

## 2024-05-22 PROCEDURE — 700102 HCHG RX REV CODE 250 W/ 637 OVERRIDE(OP): Performed by: INTERNAL MEDICINE

## 2024-05-22 PROCEDURE — 85025 COMPLETE CBC W/AUTO DIFF WBC: CPT

## 2024-05-22 RX ADMIN — SODIUM BICARBONATE 1950 MG: 650 TABLET ORAL at 05:10

## 2024-05-22 RX ADMIN — OXYCODONE 5 MG: 5 TABLET ORAL at 02:36

## 2024-05-22 RX ADMIN — RIFAXIMIN 550 MG: 550 TABLET ORAL at 05:11

## 2024-05-22 RX ADMIN — OXYCODONE 5 MG: 5 TABLET ORAL at 20:49

## 2024-05-22 RX ADMIN — APIXABAN 5 MG: 5 TABLET, FILM COATED ORAL at 17:11

## 2024-05-22 RX ADMIN — ATORVASTATIN CALCIUM 20 MG: 20 TABLET, FILM COATED ORAL at 20:50

## 2024-05-22 RX ADMIN — SODIUM BICARBONATE 1950 MG: 650 TABLET ORAL at 14:29

## 2024-05-22 RX ADMIN — SODIUM BICARBONATE 1950 MG: 650 TABLET ORAL at 21:17

## 2024-05-22 RX ADMIN — RIFAXIMIN 550 MG: 550 TABLET ORAL at 17:11

## 2024-05-22 RX ADMIN — OXYCODONE 5 MG: 5 TABLET ORAL at 14:28

## 2024-05-22 RX ADMIN — Medication 1000 UNITS: at 05:11

## 2024-05-22 RX ADMIN — APIXABAN 5 MG: 5 TABLET, FILM COATED ORAL at 05:11

## 2024-05-22 RX ADMIN — OMEPRAZOLE 40 MG: 20 CAPSULE, DELAYED RELEASE ORAL at 05:10

## 2024-05-22 RX ADMIN — LACTULOSE 30 ML: 20 SOLUTION ORAL at 05:10

## 2024-05-22 RX ADMIN — CEFUROXIME AXETIL 250 MG: 250 TABLET, FILM COATED ORAL at 05:10

## 2024-05-22 RX ADMIN — CEFUROXIME AXETIL 250 MG: 250 TABLET, FILM COATED ORAL at 17:11

## 2024-05-22 ASSESSMENT — PAIN DESCRIPTION - PAIN TYPE
TYPE: ACUTE PAIN
TYPE: CHRONIC PAIN

## 2024-05-22 ASSESSMENT — ENCOUNTER SYMPTOMS
VOMITING: 0
PALPITATIONS: 0
CHILLS: 0
SPUTUM PRODUCTION: 0
ORTHOPNEA: 0
SHORTNESS OF BREATH: 0
FEVER: 0
DIARRHEA: 0
CONSTIPATION: 0
COUGH: 0
WHEEZING: 0
ABDOMINAL PAIN: 1
NAUSEA: 0

## 2024-05-22 ASSESSMENT — COGNITIVE AND FUNCTIONAL STATUS - GENERAL
MOVING TO AND FROM BED TO CHAIR: A LITTLE
STANDING UP FROM CHAIR USING ARMS: A LITTLE
MOBILITY SCORE: 15
MOVING FROM LYING ON BACK TO SITTING ON SIDE OF FLAT BED: A LITTLE
SUGGESTED CMS G CODE MODIFIER MOBILITY: CK
WALKING IN HOSPITAL ROOM: TOTAL
CLIMB 3 TO 5 STEPS WITH RAILING: TOTAL

## 2024-05-22 ASSESSMENT — GAIT ASSESSMENTS: GAIT LEVEL OF ASSIST: UNABLE TO PARTICIPATE

## 2024-05-22 ASSESSMENT — FIBROSIS 4 INDEX: FIB4 SCORE: 2.285714285714285714

## 2024-05-22 NOTE — CARE PLAN
The patient is Stable - Low risk of patient condition declining or worsening    Shift Goals  Clinical Goals: safety, pain management, monitor I/O, ABX  Patient Goals: rest, go home  Family Goals: gisele    Progress made toward(s) clinical / shift goals:    Problem: Knowledge Deficit - Standard  Goal: Patient and family/care givers will demonstrate understanding of plan of care, disease process/condition, diagnostic tests and medications  Description: Target End Date:  1-3 days or as soon as patient condition allows    Document in Patient Education    1.  Patient and family/caregiver oriented to unit, equipment, visitation policy and means for communicating concern  2.  Complete/review Learning Assessment  3.  Assess knowledge level of disease process/condition, treatment plan, diagnostic tests and medications  4.  Explain disease process/condition, treatment plan, diagnostic tests and medications  Outcome: Progressing     Problem: Pain - Standard  Goal: Alleviation of pain or a reduction in pain to the patient’s comfort goal  Description: Target End Date:  Prior to discharge or change in level of care    Document on Vitals flowsheet    1.  Document pain using the appropriate pain scale per order or unit policy  2.  Educate and implement non-pharmacologic comfort measures (i.e. relaxation, distraction, massage, cold/heat therapy, etc.)  3.  Pain management medications as ordered  4.  Reassess pain after pain med administration per policy  5.  If opiods administered assess patient's response to pain medication is appropriate per POSS sedation scale  6.  Follow pain management plan developed in collaboration with patient and interdisciplinary team (including palliative care or pain specialists if applicable)  Outcome: Progressing       Patient is not progressing towards the following goals:

## 2024-05-22 NOTE — CARE PLAN
Problem: Knowledge Deficit - Standard  Goal: Patient and family/care givers will demonstrate understanding of plan of care, disease process/condition, diagnostic tests and medications  Outcome: Progressing     Problem: Pain - Standard  Goal: Alleviation of pain or a reduction in pain to the patient’s comfort goal  Outcome: Progressing     Problem: Skin Integrity  Goal: Skin integrity is maintained or improved  Outcome: Progressing     Problem: Fall Risk  Goal: Patient will remain free from falls  Outcome: Progressing   The patient is Stable - Low risk of patient condition declining or worsening    Shift Goals  Clinical Goals: safety, pain management, monitor I/O, ABX  Patient Goals: rest, go home  Family Goals: gisele

## 2024-05-22 NOTE — ASSESSMENT & PLAN NOTE
Patient has had minimal mobilization during her stay in the hospital.  Also has left lower extremity BKA.  PT recommended SNF.  However patient refuses to have physical therapy.  PT has advised for home health given patient's reluctance to SNF, daughter aggreable as patient lives with daughter.   -dispo pending for clearance from nephrology.

## 2024-05-22 NOTE — THERAPY
Physical Therapy   Daily Treatment     Patient Name: April Alicia  Age:  64 y.o., Sex:  female  Medical Record #: [id number]  Today's Date: 5/22/2024     Precautions  Precautions: Fall Risk  Comments: L AKA    Assessment  Pt seen for PT tx session with mobility detailed down below. Pt demonstrated improvements from last session as is able to transfers between level surfaces at [facility]. Pt needs assist from low chair, however she states her chairs are not that low at home. Anticipate that pt will continue progressing while admitted and should be able to DC home with HHPT as long as family/friends can be there to provide guarding for transfers initially. Pt is adamant that she does not want to go to a rehab facility. Will follow.    Plan    Treatment Plan Status: Continue Current Treatment Plan  Type of Treatment: Bed Mobility, Neuro Re-Education / Balance, Self Care / Home Evaluation, Therapeutic Activities, Therapeutic Exercise, Stair Training  Treatment Frequency: 3 Times per Week  Treatment Duration: Until Therapy Goals Met    DC Equipment Recommendations: None  Discharge Recommendations: Recommend home health for continued physical therapy services (as long as family/friends can be there to provide guarding for transfers initially. pt adamant that she does not want to go to a rehab facility)        Vitals   O2 (LPM) 0   O2 Delivery Device None - Room Air   Vitals Comments CNA reporting that HR was up to 160 bpm when pt was pushing her WC in the reddy   Pain 0 - 10 Group   Therapist Pain Assessment   (notes recent abdomen pain)   Balance   Sitting Balance (Static) Fair +   Sitting Balance (Dynamic) Fair   Standing Balance (Static) Fair -   Standing Balance (Dynamic) Poor +   Weight Shift Sitting Fair   Skilled Intervention Verbal Cuing   Comments close gurading for all transfers   Bed Mobility    Comments NT, in chair pre/post   Gait Analysis   Gait Level Of Assist Unable to Participate   Functional Mobility    Sit to Stand Contact Guard Assist   Bed, Chair, Wheelchair Transfer Contact Guard Assist   Transfer Method Stand Pivot   Mobility multiple SPT   Wheelchair Assist Supervised   Distance Wheelchair (Feet or Distance) 150   Skilled Intervention Verbal Cuing   Comments pt needing Min assist with transfer from low chair height, pt stating all her chairs at home are higher/similar to her WC height   6 Clicks Assessment - How much HELP from from another person do you currently need... (If the patient hasn't done an activity recently, how much help from another person do you think he/she would need if he/she tried?)   Turning from your back to your side while in a flat bed without using bedrails? 4   Moving from lying on your back to sitting on the side of a flat bed without using bedrails? 3   Moving to and from a bed to a chair (including a wheelchair)? 3   Standing up from a chair using your arms (e.g., wheelchair, or bedside chair)? 3   Walking in hospital room? 1   Climbing 3-5 steps with a railing? 1   6 clicks Mobility Score 15   Short Term Goals    Short Term Goal # 1 pt will complete spt with spv in 6 tx for functional mobility.   Goal Outcome # 1 Progressing as expected   Short Term Goal # 2 pt will propel self 150 ft in wc with spv in 6 tx for household distances.   Goal Outcome # 2 Goal not met   Physical Therapy Treatment Plan   Physical Therapy Treatment Plan Continue Current Treatment Plan   Anticipated Discharge Equipment and Recommendations   DC Equipment Recommendations None   Discharge Recommendations Recommend home health for continued physical therapy services  (as long as family/friends can be there to provide guarding for transfers initially. pt adamant that she does not want to go to a rehab facility)   Interdisciplinary Plan of Care Collaboration   IDT Collaboration with  Nursing   Patient Position at End of Therapy Seated;Chair Alarm On;Call Light within Reach;Tray Table within Reach;Phone within  Reach   Collaboration Comments RN updated   Session Information   Date / Session Number  5/22- 3 (3/3, 5/23)

## 2024-05-22 NOTE — PROGRESS NOTES
[facility] -  PROGRESS NOTE               Author: Scott Roper D.O. Date & Time: 5/22/2024  10:54 AM     HPI:  64yoF with PMH significant for HTN, DM II, alcoholic cirrhosis with ascites, atrial fibrillation on anticoagulation, history of left-sided BKA, recent hospitalization for decompensated cirrhosis, admitted with complaints of fevers chills and SOB x 2 days and now with ELIZABETH and electrolyte abnormalities.  On admission patient was found to have sepsis thought to be secondary to hospital-acquired pneumonia and UTI and so she was started on broad-spectrum antibiotics with linezolid and Zosyn.  She also has bacteremia with Streptococcus pneumoniae and urine culture with MSSA.  She did undergo a CT abdomen/pelvis with IV contrast 5/16/2024 that showed new left lower lobe lung consolidation and small left pleural effusion likely due to pneumonia, cirrhosis and portal hypertension and splenomegaly, and ascites, and possible colitis of the cecum.  On admission her creatinine was 1.4 and prior to that during her recent hospital admission for decompensated cirrhosis earlier this month her creatinine was ~0.7 (was 0.7 on 5/14/2024 on discharge).  Her creatinine increased to 1.67 on 5/17/2024 and then yesterday it increased to 2.28 and today it is higher at 3.03.  She did receive an NS bolus yesterday.  Her sodium this morning is low at 122 and her potassium is elevated at 5.5 and she does have metabolic acidosis.  Unclear if her UOP is being documented accurately but 310 cc over the past 24 hours is recorded.  She denies any F/C/N/V/CP/SOB, + LE edema, + abdominal pain, + loose stools, denies any HA/vision changes.    DAILY NEPHROLOGY SUMMARY:  5/20 - No acute events overnight, vitals stable, remains oliguric w/  in the past 24 hours. Overall feels improved compared to yesterday. Slow speech.  5/21: No acute events overnight, vital signs stable SBP 100s-110s, on RA.  Improvement in UOP  "with 1275 cc x 24 hrs.  Significant improvement in labs with  (121), bicarb 19 (16), without AG, creatinine 3.32 (3.55).  5/22: No acute events overnight, vital signs stable SBP 100s-120s, on room air.   cc X 24 hours.  Labs stable with improvement in Na 130(127), creatinine 2.9 (3.32).  Corrected calcium 9.0, Phos 4.4, K 4.0.    REVIEW OF SYSTEMS:    10 point ROS reviewed and is as per HPI/daily summary or otherwise negative    PMH/PSH/SH/FH: Reviewed and unchanged since admission note  CURRENT MEDICATIONS: Reviewed from admission to present day    VS:  /74   Pulse 85   Temp 36.6 °C (97.9 °F) (Temporal)   Resp 18   Ht 1.702 m (5' 7\")   Wt 112 kg (246 lb 0.5 oz)   LMP 06/02/2008   SpO2 94%   BMI 38.53 kg/m²   Physical Exam  Constitutional:       General: She is not in acute distress.  HENT:      Head: Normocephalic and atraumatic.      Nose: Nose normal.      Mouth/Throat:      Mouth: Mucous membranes are moist.   Eyes:      Extraocular Movements: Extraocular movements intact.      Conjunctiva/sclera: Conjunctivae normal.      Pupils: Pupils are equal, round, and reactive to light.   Cardiovascular:      Rate and Rhythm: Normal rate and regular rhythm.      Heart sounds: No murmur heard.     No friction rub. No gallop.   Pulmonary:      Effort: Pulmonary effort is normal.      Breath sounds: Normal breath sounds. No wheezing, rhonchi or rales.   Abdominal:      General: There is no distension.      Palpations: Abdomen is soft.      Tenderness: There is abdominal tenderness (mild, lower abdomen). There is no guarding or rebound.   Musculoskeletal:      Right lower leg: Edema (trace) present.      Comments: S/p L BKA   Skin:     General: Skin is warm.   Neurological:      Mental Status: She is alert.      Comments: Slow speech   Psychiatric:         Mood and Affect: Mood normal.         Behavior: Behavior normal.       Fluids:  In: 1160 [P.O.:1160]  Out: 750     LABS:  Recent Labs     " 05/20/24  0027 05/21/24  0350 05/22/24  0132   SODIUM 121* 127* 130*   POTASSIUM 4.8 4.4 4.0   CHLORIDE 92* 96 99   CO2 16* 19* 21   GLUCOSE 140* 163* 158*   BUN 50* 53* 49*   CREATININE 3.55* 3.32* 2.90*   CALCIUM 6.4* 6.7* 7.2*       IMPRESSION:  # ELIZABETH--creatinine 0.7 on 5/14/2024 during her previous visit  -ELIZABETH multifactorial likely related to LORETTA and some relative hypotension and possible prerenal etiology  -Low concern for HRS at this time  -Improvement in UOP and Cr with supportive measures  #Hyponatremia--improving  -Diuretics now held, patient also had been on spironolactone  -Had several loose bowel movements related to lactulose  -Urine osmolality elevated 332 and urine Na low  #Hyperkalemia--2/2 ELIZABETH, resolved  -Treated medically with Lokelma  #Metabolic acidosis--resoled  -Continuing p.o. bicarb supplements  -Provided 1 amp of bicarb 5/20  #Leukocytosis--present on admission and now resolved  -On antibiotics for Streptococcus bacteremia, hospital-acquired pneumonia, and MSSA UTI  #Anemia--iron sat low  #Thrombocytopenia--likely secondary to liver disease, monitor  #Hypocalcemia--PTH elevated and Vit D low, resolved  -Repleted appropriately w/ PO/IV supplementation  #Alcoholic cirrhosis--with ascites  -Diuretics currently held  #Hyperphosphatemia--secondary to ELIZABETH, resolved with PhosLo  #Hypomagnesemia--Likely in the setting of loose BM's, resolved    PLAN:  -No acute need for RRT but will monitor closely  -This patient is a poor long-term dialysis candidate given her liver disease and other comorbidities  -Continue oral fluid restriction less than 1.2 L/day  -Hold diueretics  -Lokelma discontinued  -Continue p.o. bicarbonate supplements 1950 mg 3 times daily, goal bicarbonate of >22  -PhosLo discontinued   -Continue vitamin D 3 1000 IUs daily  -Antibiotics per primary service  -No IV iron given active infection, ok to give PO iron supplements  -Avoid further IV contrast, nephrotoxins, NSAIDs  -Dose all  meds for decreased GFR  -Other management per primary service

## 2024-05-22 NOTE — PROGRESS NOTES
[facility] Internal Medicine Daily Progress Note    Date of Service  5/22/2024    [facility] Team: R IM Blue Team   Attending: Eric Viera M.d.  Senior Resident: Dr. Schulte  Intern:  Dr. Richards  Contact Number: [phone]    Chief Complaint  April Alicia is a 64 y.o. female admitted 5/16/2024 with vaginal bleeding/hematuria    Hospital Course  April Alicia is a 64 y.o. 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 She presented 5/16/2024 with fevers, chills, hematuria, and shortness of breath. In the ER, she was tachycardic, febrile, requiring 1-2 lt O2. CT imaging showed a left lower lobe consolidation suggestive of pneumonia and a small pleural effusion. CT abdomen without significant ascites, had bladder wall thickening without kidney abnormalities. Had a leukocytosis > 14,000, sodium 131, creatinine 1.42, lactic acid of 2.6. urinalysis with significant hematuria and inflammatory changes. Admitted for sepsis due to HAP and UTI. Started on Linezolid and Zosyn.  Urine culture growing MSSA 1 set of blood cultures growing strep pneumo.  Linezolid was later discontinued and the patient was clinically improving on IV Zosyn.  However, her renal function continued to worsen even with the withholding of diuretics and a small NS bolus.  Nephrology was consulted.     Zosyn was switched to ceftriaxone given MSSA on 5/19/24. Given risk of C diff, switched to cefuroxime oral on 5/20/24.     Interval Problem Update  -No acute events overnight.    -Patient is more awake and arousable today, is awake to voice/light touch, mentation still intact with Aox4.   -Improvement in creatinine from 3.3-2.9; sodium improved from 127 to 130.   Nephrology advises to continue holding diuretics, discontinue lokelma, bicarb supplements, discontinue phoslo and added IV calcium gluconate twice daily.   -Seems that gabapentin not the culprit of drowsiness, patient may have early symptoms of hepatic encephalopathy, and therefore rifaximin and lactulose was started today. Ammonia collected (44).  Mentation looks to be improved.  - Patient refuses SNF; daughter will bring in wheelchair to see how mobile patient is.    I have discussed this patient's plan of care and discharge plan at IDT rounds today with Case Management, Nursing, Nursing leadership, and other members of the IDT team.    Consultants/Specialty  Nephrology    Code Status  Full Code    Disposition  The patient is not medically cleared for discharge to home or a post-acute facility.      I have placed the appropriate orders for post-discharge needs.    Review of Systems  Review of Systems   Constitutional:  Negative for chills and fever.   Respiratory:  Negative for cough, sputum production, shortness of breath and wheezing.    Cardiovascular:  Negative for chest pain, palpitations, orthopnea and leg swelling.   Gastrointestinal:  Positive for abdominal pain. Negative for constipation, diarrhea, nausea and vomiting.        Physical Exam  Temp:  [36.3 °C (97.3 °F)-36.7 °C (98.1 °F)] 36.3 °C (97.3 °F)  Pulse:  [78-92] 87  Resp:  [18] 18  BP: (105-130)/(59-84) 130/84  SpO2:  [94 %-96 %] 94 %    Physical Exam  Constitutional:       General: She is not in acute distress.     Appearance: She is not ill-appearing.   HENT:      Head: Normocephalic.      Mouth/Throat:      Mouth: Mucous membranes are dry.   Eyes:      General: No scleral icterus.        Right eye: No discharge.         Left eye: No discharge.   Cardiovascular:      Rate and Rhythm: Normal rate and regular rhythm.      Pulses: Normal pulses.      Heart sounds: Normal heart sounds.   Pulmonary:      Effort: Pulmonary effort is normal.      Breath  sounds: Normal breath sounds.   Abdominal:      Palpations: Abdomen is soft.      Tenderness: There is no guarding or rebound.      Comments: Mild to moderate lower abdominal tenderness to palpation improving since yesterday   Musculoskeletal:      Right lower leg: Edema present.      Comments: 1+ pitting edema of RLE  LLE BKA   Skin:     General: Skin is warm and dry.      Coloration: Skin is not jaundiced.   Neurological:      Mental Status: She is alert and oriented to person, place, and time. Mental status is at baseline.   Psychiatric:         Mood and Affect: Mood normal.          Fluids    Intake/Output Summary (Last 24 hours) at 5/22/2024 1357  Last data filed at 5/22/2024 1030  Gross per 24 hour   Intake 760 ml   Output 300 ml   Net 460 ml       Laboratory  Recent Labs     05/20/24 0027 05/21/24  0350 05/22/24  0132   WBC 5.2 7.4 6.3   RBC 3.09* 3.12* 3.26*   HEMOGLOBIN 8.2* 8.2* 8.6*   HEMATOCRIT 24.8* 24.9* 25.7*   MCV 80.3* 79.8* 78.8*   MCH 26.5* 26.3* 26.4*   MCHC 33.1 32.9 33.5   RDW 45.6 45.8 45.5   PLATELETCT 97* 115* 140*   MPV  --  10.5 11.4     Recent Labs     05/20/24  0027 05/21/24  0350 05/22/24  0132   SODIUM 121* 127* 130*   POTASSIUM 4.8 4.4 4.0   CHLORIDE 92* 96 99   CO2 16* 19* 21   GLUCOSE 140* 163* 158*   BUN 50* 53* 49*   CREATININE 3.55* 3.32* 2.90*   CALCIUM 6.4* 6.7* 7.2*       Imaging  US-ABDOMEN LTD (SOFT TISSUE)   Final Result      Small amount of ascites in the abdomen.      CT-ABDOMEN-PELVIS WITH   Final Result         1. New left lower lobe consolidation and small left pleural effusion. This is likely due to pneumonia.   2. Cirrhosis, portal hypertension, and splenomegaly.   3. Atherosclerosis including coronary artery disease.   4. Ascites.   5. Possible colitis of the cecum.      DX-CHEST-PORTABLE (1 VIEW)   Final Result      Hypoinflation and cardiomegaly. Mild edema cannot be excluded.           Assessment/Plan:    * Sepsis (HCC)- (present on admission)  Assessment &  Plan  This is Sepsis Present on admission  SIRS criteria identified on my evaluation include: Fever, with temperature greater than 100.9 deg F, Tachycardia, with heart rate greater than 90 BPM, and Leukocytosis, with WBC greater than 12,000  Clinical indicators of end organ dysfunction include Lactic Acid greater than 2  Source is pulmonary and urinary.   Sepsis protocol initiated. Resuscitation volume of 2L ordered. Reason that resuscitation volume of less than 30ml/kg was ordered concern for fluid overload  IV antibiotics as appropriate for source of sepsis - on Linezolid and Zosyn given concern for HAP and UTI - see individual plans   Urine cultures grew S. aureus; blood cultures grew S pneumonia    Alcoholic cirrhosis of liver with ascites (HCC)- (present on admission)  Assessment & Plan  CHILD class B.   Patient has a positive HCV antibody but negative RNA/NAAT - resolved infection.   Recent hospitalization for cirrhosis decompensation with anasarca. No paracentesis needed. No hx of GIB/EV.   No concern for acute decompensation, no ascites/minimal seen in CT this time.   - Started lactulose 5/21 with improvement in mentation  - Holding aldactone and lasix   - Monitor I and Os strictly    Physical deconditioning  Assessment & Plan  Patient has had minimal mobilization during her stay in the hospital.  Also has left lower extremity BKA.  PT recommended SNF.  However patient refuses to have physical therapy.  - Will reassess if patient able to transfer from bed to chair/bed to wheelchair  - Daughter will bring in wheelchair to reassess    Acute hypoxemic respiratory failure (HCC)  Assessment & Plan  Secondary to pneumonia. Stable O2 needs, minimal.   - Encourage IS   - See plan for HAP     AF (atrial fibrillation) (HCC)  Assessment & Plan  Rate controlled.   - Continue home dose metoprolol.    - Holding apixaban in the setting of hematuria    Hematuria  Assessment & Plan  Likely in the setting of UTI. Confirmed  not vaginal bleeding - hx of histerecetomy.   - Holding home dose apixaban for today and reevaluate as infections clears   - If no resolution will need outpt urology    Hyperglycemia  Assessment & Plan  Per chart review, it was noted the patient has a history of type 2 diabetes, but patient is now at prediabetes range.   - CTM fasting BG     Hospital-acquired pneumonia  Assessment & Plan  Patient with recent discharge from the [facility] several days ago.  Now presenting with a Tmax of 103.6, tachycardia, leukocytosis, a retrocardiac consolidation and positive procalcitonin. Minimal o2 requirements.   CT imaging -- New left lower lobe consolidation and small left pleural effusion.   Has hx of pseudomonas in her prior infected joint (s/p BKA), not isolated in blood, urine  or sputum priorly. Hx of ESBL in urine.   Empirically covering with linezolid and Zosyn IV  - MRSA nares negative  - On Zosyn also covering for urinary source   -Sputum cultures demonstrated strep pneumo  - O2 per protocol - encourage IS    Nausea & vomiting- (present on admission)  Assessment & Plan  - IV and PO antiemetics     Lactic acid acidosis- (present on admission)  Assessment & Plan  Due to sepsis. Resolved.     Normocytic anemia- (present on admission)  Assessment & Plan  Prior iron studies was suggestive of iron deficiency anemia   - Monitor BM for melena - describes possible hx of melena  - Recheck anemia w/u    Hyponatremia- (present on admission)  Assessment & Plan  Likely hypovolemic - secondary to Lasix and her diuretics, also poor PO intake.  - Encouraging PO intake, dc fluids  - CTM; slowly improving    Acute cystitis with hematuria- (present on admission)  Assessment & Plan  Patient states that she is noticed some blood in urine over the last several days, along with suprapubic pain. Bladder wall thickening in CT without kidney abnormalities.   Concern for UTI - hx of ESBL in urine x2 S to zosyn.   - Continue Zosyn  for now   - Follow urine cultures (grew S. aureus)    Acute kidney injury (HCC)- (present on admission)  Assessment & Plan  Creatinine level on admission was 1.42, BUN/Cr is 15 but stills suspect prerenal in the setting of sepsis and prior diuretic use, there is no evidence of cirrhosis decompensation at this time suggesting hepatorenal etiology.   - Holding home dose Lasix, Aldactone, ACE/ARB  - Stopped IVF, continue encouraging PO fluids as patient is tolerating well   - Monitor          VTE prophylaxis: Eliquis     I have performed a physical exam and reviewed and updated ROS and Plan today (5/22/2024). In review of yesterday's note (5/21/2024), there are no changes except as documented above.

## 2024-05-23 ENCOUNTER — HOME HEALTH ADMISSION (OUTPATIENT)
Dept: HOME HEALTH SERVICES | Facility: HOME HEALTHCARE | Age: 64
End: 2024-05-23
Payer: MEDICAID

## 2024-05-23 LAB
ALBUMIN SERPL BCP-MCNC: 2 G/DL (ref 3.2–4.9)
ALBUMIN SERPL BCP-MCNC: 2.1 G/DL (ref 3.2–4.9)
ALBUMIN/GLOB SERPL: 0.5 G/DL
ALBUMIN/GLOB SERPL: 0.5 G/DL
ALP SERPL-CCNC: 175 U/L (ref 30–99)
ALP SERPL-CCNC: 178 U/L (ref 30–99)
ALT SERPL-CCNC: 10 U/L (ref 2–50)
ALT SERPL-CCNC: 9 U/L (ref 2–50)
AMMONIA PLAS-SCNC: 39 UMOL/L (ref 11–45)
ANION GAP SERPL CALC-SCNC: 11 MMOL/L (ref 7–16)
ANION GAP SERPL CALC-SCNC: 9 MMOL/L (ref 7–16)
APPEARANCE UR: CLEAR
AST SERPL-CCNC: 16 U/L (ref 12–45)
AST SERPL-CCNC: 17 U/L (ref 12–45)
BACTERIA #/AREA URNS HPF: NEGATIVE /HPF
BILIRUB SERPL-MCNC: 0.4 MG/DL (ref 0.1–1.5)
BILIRUB SERPL-MCNC: 0.5 MG/DL (ref 0.1–1.5)
BILIRUB UR QL STRIP.AUTO: NEGATIVE
BUN SERPL-MCNC: 40 MG/DL (ref 8–22)
BUN SERPL-MCNC: 41 MG/DL (ref 8–22)
CALCIUM ALBUM COR SERPL-MCNC: 9 MG/DL (ref 8.5–10.5)
CALCIUM ALBUM COR SERPL-MCNC: 9.1 MG/DL (ref 8.5–10.5)
CALCIUM SERPL-MCNC: 7.4 MG/DL (ref 8.5–10.5)
CALCIUM SERPL-MCNC: 7.6 MG/DL (ref 8.5–10.5)
CHLORIDE SERPL-SCNC: 102 MMOL/L (ref 96–112)
CHLORIDE SERPL-SCNC: 103 MMOL/L (ref 96–112)
CO2 SERPL-SCNC: 21 MMOL/L (ref 20–33)
CO2 SERPL-SCNC: 22 MMOL/L (ref 20–33)
COLOR UR: YELLOW
CREAT SERPL-MCNC: 1.74 MG/DL (ref 0.5–1.4)
CREAT SERPL-MCNC: 1.85 MG/DL (ref 0.5–1.4)
EPI CELLS #/AREA URNS HPF: NEGATIVE /HPF
GFR SERPLBLD CREATININE-BSD FMLA CKD-EPI: 30 ML/MIN/1.73 M 2
GFR SERPLBLD CREATININE-BSD FMLA CKD-EPI: 32 ML/MIN/1.73 M 2
GLOBULIN SER CALC-MCNC: 3.7 G/DL (ref 1.9–3.5)
GLOBULIN SER CALC-MCNC: 4.4 G/DL (ref 1.9–3.5)
GLUCOSE SERPL-MCNC: 117 MG/DL (ref 65–99)
GLUCOSE SERPL-MCNC: 120 MG/DL (ref 65–99)
GLUCOSE UR STRIP.AUTO-MCNC: NEGATIVE MG/DL
HYALINE CASTS #/AREA URNS LPF: ABNORMAL /LPF
KETONES UR STRIP.AUTO-MCNC: NEGATIVE MG/DL
LEUKOCYTE ESTERASE UR QL STRIP.AUTO: ABNORMAL
MAGNESIUM SERPL-MCNC: 2.3 MG/DL (ref 1.5–2.5)
MICRO URNS: ABNORMAL
NITRITE UR QL STRIP.AUTO: NEGATIVE
PH UR STRIP.AUTO: 6.5 [PH] (ref 5–8)
POTASSIUM SERPL-SCNC: 3.8 MMOL/L (ref 3.6–5.5)
POTASSIUM SERPL-SCNC: 3.8 MMOL/L (ref 3.6–5.5)
PROT SERPL-MCNC: 5.7 G/DL (ref 6–8.2)
PROT SERPL-MCNC: 6.5 G/DL (ref 6–8.2)
PROT UR QL STRIP: 100 MG/DL
RBC # URNS HPF: ABNORMAL /HPF
RBC UR QL AUTO: ABNORMAL
SODIUM SERPL-SCNC: 134 MMOL/L (ref 135–145)
SODIUM SERPL-SCNC: 134 MMOL/L (ref 135–145)
SP GR UR STRIP.AUTO: 1.01
UROBILINOGEN UR STRIP.AUTO-MCNC: 0.2 MG/DL
WBC #/AREA URNS HPF: ABNORMAL /HPF

## 2024-05-23 PROCEDURE — A9270 NON-COVERED ITEM OR SERVICE: HCPCS

## 2024-05-23 PROCEDURE — 81001 URINALYSIS AUTO W/SCOPE: CPT

## 2024-05-23 PROCEDURE — A9270 NON-COVERED ITEM OR SERVICE: HCPCS | Performed by: STUDENT IN AN ORGANIZED HEALTH CARE EDUCATION/TRAINING PROGRAM

## 2024-05-23 PROCEDURE — 80053 COMPREHEN METABOLIC PANEL: CPT

## 2024-05-23 PROCEDURE — 700102 HCHG RX REV CODE 250 W/ 637 OVERRIDE(OP)

## 2024-05-23 PROCEDURE — 99232 SBSQ HOSP IP/OBS MODERATE 35: CPT | Mod: GC | Performed by: HOSPITALIST

## 2024-05-23 PROCEDURE — 82140 ASSAY OF AMMONIA: CPT

## 2024-05-23 PROCEDURE — 770001 HCHG ROOM/CARE - MED/SURG/GYN PRIV*

## 2024-05-23 PROCEDURE — 87040 BLOOD CULTURE FOR BACTERIA: CPT

## 2024-05-23 PROCEDURE — A9270 NON-COVERED ITEM OR SERVICE: HCPCS | Performed by: INTERNAL MEDICINE

## 2024-05-23 PROCEDURE — 700102 HCHG RX REV CODE 250 W/ 637 OVERRIDE(OP): Performed by: INTERNAL MEDICINE

## 2024-05-23 PROCEDURE — 83735 ASSAY OF MAGNESIUM: CPT

## 2024-05-23 PROCEDURE — 700102 HCHG RX REV CODE 250 W/ 637 OVERRIDE(OP): Performed by: STUDENT IN AN ORGANIZED HEALTH CARE EDUCATION/TRAINING PROGRAM

## 2024-05-23 PROCEDURE — 97530 THERAPEUTIC ACTIVITIES: CPT

## 2024-05-23 PROCEDURE — 36415 COLL VENOUS BLD VENIPUNCTURE: CPT

## 2024-05-23 RX ORDER — CEFUROXIME AXETIL 500 MG/1
500 TABLET ORAL EVERY 12 HOURS
Status: COMPLETED | OUTPATIENT
Start: 2024-05-23 | End: 2024-05-24

## 2024-05-23 RX ADMIN — OXYCODONE 5 MG: 5 TABLET ORAL at 21:23

## 2024-05-23 RX ADMIN — LACTULOSE 30 ML: 20 SOLUTION ORAL at 12:49

## 2024-05-23 RX ADMIN — APIXABAN 5 MG: 5 TABLET, FILM COATED ORAL at 04:06

## 2024-05-23 RX ADMIN — RIFAXIMIN 550 MG: 550 TABLET ORAL at 16:27

## 2024-05-23 RX ADMIN — ATORVASTATIN CALCIUM 20 MG: 20 TABLET, FILM COATED ORAL at 21:23

## 2024-05-23 RX ADMIN — CEFUROXIME AXETIL 250 MG: 250 TABLET, FILM COATED ORAL at 04:06

## 2024-05-23 RX ADMIN — RIFAXIMIN 550 MG: 550 TABLET ORAL at 04:06

## 2024-05-23 RX ADMIN — SODIUM BICARBONATE 1950 MG: 650 TABLET ORAL at 04:06

## 2024-05-23 RX ADMIN — LACTULOSE 30 ML: 20 SOLUTION ORAL at 16:27

## 2024-05-23 RX ADMIN — SODIUM BICARBONATE 1950 MG: 650 TABLET ORAL at 12:49

## 2024-05-23 RX ADMIN — CEFUROXIME AXETIL 500 MG: 500 TABLET ORAL at 16:27

## 2024-05-23 RX ADMIN — OMEPRAZOLE 40 MG: 20 CAPSULE, DELAYED RELEASE ORAL at 04:06

## 2024-05-23 RX ADMIN — SODIUM BICARBONATE 1950 MG: 650 TABLET ORAL at 16:27

## 2024-05-23 RX ADMIN — APIXABAN 5 MG: 5 TABLET, FILM COATED ORAL at 16:27

## 2024-05-23 RX ADMIN — Medication 1000 UNITS: at 04:06

## 2024-05-23 RX ADMIN — OXYCODONE 5 MG: 5 TABLET ORAL at 04:06

## 2024-05-23 RX ADMIN — OXYCODONE 5 MG: 5 TABLET ORAL at 15:37

## 2024-05-23 ASSESSMENT — COGNITIVE AND FUNCTIONAL STATUS - GENERAL
MOVING TO AND FROM BED TO CHAIR: A LOT
WALKING IN HOSPITAL ROOM: TOTAL
SUGGESTED CMS G CODE MODIFIER MOBILITY: CL
CLIMB 3 TO 5 STEPS WITH RAILING: TOTAL
MOBILITY SCORE: 12
TURNING FROM BACK TO SIDE WHILE IN FLAT BAD: A LITTLE
STANDING UP FROM CHAIR USING ARMS: A LOT
MOVING FROM LYING ON BACK TO SITTING ON SIDE OF FLAT BED: A LITTLE

## 2024-05-23 ASSESSMENT — PAIN DESCRIPTION - PAIN TYPE
TYPE: ACUTE PAIN
TYPE: ACUTE PAIN
TYPE: CHRONIC PAIN
TYPE: ACUTE PAIN

## 2024-05-23 ASSESSMENT — ENCOUNTER SYMPTOMS
SHORTNESS OF BREATH: 0
PALPITATIONS: 0
VOMITING: 0
FEVER: 0
ABDOMINAL PAIN: 1
DIARRHEA: 0
COUGH: 0
ORTHOPNEA: 0
WHEEZING: 0
NAUSEA: 0
CONSTIPATION: 0
CHILLS: 0
SPUTUM PRODUCTION: 0

## 2024-05-23 ASSESSMENT — GAIT ASSESSMENTS: GAIT LEVEL OF ASSIST: UNABLE TO PARTICIPATE

## 2024-05-23 ASSESSMENT — FIBROSIS 4 INDEX: FIB4 SCORE: 2.285714285714285714

## 2024-05-23 NOTE — PROGRESS NOTES
[facility] -  PROGRESS NOTE               Author: Scott Roper D.O. Date & Time: 5/23/2024  9:45 AM     HPI:  64yoF with PMH significant for HTN, DM II, alcoholic cirrhosis with ascites, atrial fibrillation on anticoagulation, history of left-sided BKA, recent hospitalization for decompensated cirrhosis, admitted with complaints of fevers chills and SOB x 2 days and now with ELIZABETH and electrolyte abnormalities.  On admission patient was found to have sepsis thought to be secondary to hospital-acquired pneumonia and UTI and so she was started on broad-spectrum antibiotics with linezolid and Zosyn.  She also has bacteremia with Streptococcus pneumoniae and urine culture with MSSA.  She did undergo a CT abdomen/pelvis with IV contrast 5/16/2024 that showed new left lower lobe lung consolidation and small left pleural effusion likely due to pneumonia, cirrhosis and portal hypertension and splenomegaly, and ascites, and possible colitis of the cecum.  On admission her creatinine was 1.4 and prior to that during her recent hospital admission for decompensated cirrhosis earlier this month her creatinine was ~0.7 (was 0.7 on 5/14/2024 on discharge).  Her creatinine increased to 1.67 on 5/17/2024 and then yesterday it increased to 2.28 and today it is higher at 3.03.  She did receive an NS bolus yesterday.  Her sodium this morning is low at 122 and her potassium is elevated at 5.5 and she does have metabolic acidosis.  Unclear if her UOP is being documented accurately but 310 cc over the past 24 hours is recorded.  She denies any F/C/N/V/CP/SOB, + LE edema, + abdominal pain, + loose stools, denies any HA/vision changes.    DAILY NEPHROLOGY SUMMARY:  5/20 - No acute events overnight, vitals stable, remains oliguric w/  in the past 24 hours. Overall feels improved compared to yesterday. Slow speech.  5/21: No acute events overnight, vital signs stable SBP 100s-110s, on RA.  Improvement in UOP  "with 1275 cc x 24 hrs.  Significant improvement in labs with  (121), bicarb 19 (16), without AG, creatinine 3.32 (3.55).  5/22: No acute events overnight, vital signs stable SBP 100s-120s, on room air.   cc X 24 hours.  Labs stable with improvement in Na 130(127), creatinine 2.9 (3.32).  Corrected calcium 9.0, Phos 4.4, K 4.0.  5/23: No acute events overnight, vital signs are stable with SBP's 110s-140s, on room air.   cc X 24 hours, however nursing staff patient wet the bed significantly.  Electrolytes WNL, Improvement of Bicarb to 22 without gap.    REVIEW OF SYSTEMS:    10 point ROS reviewed and is as per HPI/daily summary or otherwise negative    PMH/PSH/SH/FH: Reviewed and unchanged since admission note  CURRENT MEDICATIONS: Reviewed from admission to present day    VS:  /86   Pulse 77   Temp 36.8 °C (98.2 °F) (Temporal)   Resp 17   Ht 1.702 m (5' 7\")   Wt 111 kg (244 lb)   LMP 06/02/2008   SpO2 94%   BMI 38.22 kg/m²   Physical Exam  Constitutional:       General: She is not in acute distress.  HENT:      Head: Normocephalic and atraumatic.      Nose: Nose normal.      Mouth/Throat:      Mouth: Mucous membranes are moist.   Eyes:      Extraocular Movements: Extraocular movements intact.      Conjunctiva/sclera: Conjunctivae normal.      Pupils: Pupils are equal, round, and reactive to light.   Cardiovascular:      Rate and Rhythm: Normal rate and regular rhythm.      Heart sounds: No murmur heard.     No friction rub. No gallop.   Pulmonary:      Effort: Pulmonary effort is normal.      Breath sounds: Normal breath sounds. No wheezing, rhonchi or rales.   Abdominal:      General: There is no distension.      Palpations: Abdomen is soft.      Tenderness: There is abdominal tenderness (mild, lower abdomen). There is no guarding or rebound.   Musculoskeletal:      Right lower leg: Edema (trace) present.      Comments: S/p L BKA   Skin:     General: Skin is warm.   Neurological:    "   Mental Status: She is alert.      Comments: Slow speech   Psychiatric:         Mood and Affect: Mood normal.         Behavior: Behavior normal.         Fluids:  In: 0   Out: 200     LABS:  Recent Labs     05/21/24  0350 05/22/24  0132 05/23/24  0715   SODIUM 127* 130* 134*   POTASSIUM 4.4 4.0 3.8   CHLORIDE 96 99 103   CO2 19* 21 22   GLUCOSE 163* 158* 120*   BUN 53* 49* 41*   CREATININE 3.32* 2.90* 1.85*   CALCIUM 6.7* 7.2* 7.4*       IMPRESSION:  # ELIZABETH--creatinine 0.7 on 5/14/2024 during her previous visit  -ELIZABETH multifactorial likely related to LORETTA and some relative hypotension and possible prerenal etiology  -Low concern for HRS at this time  -Improvement in UOP and Cr with supportive measures,   #Hyponatremia--improving  -Diuretics now held, patient also had been on spironolactone  -Had several loose bowel movements related to lactulose  -Urine osmolality elevated 332 and urine Na low  #Hyperkalemia--2/2 ELIZABETH, resolved  -Treated medically with Lokelma  #Metabolic acidosis--resoled  -Continuing p.o. bicarb supplements  -Provided 1 amp of bicarb 5/20  #Leukocytosis--present on admission and now resolved  -On antibiotics for Streptococcus bacteremia, hospital-acquired pneumonia, and MSSA UTI  #Anemia--iron sat low  #Thrombocytopenia--likely secondary to liver disease, monitor  #Hypocalcemia--PTH elevated and Vit D low, resolved  -Repleted appropriately w/ PO/IV supplementation  #Alcoholic cirrhosis--with ascites  -Diuretics currently held  #Hyperphosphatemia--secondary to ELIZABETH, resolved with PhosLo  #Hypomagnesemia--Likely in the setting of loose BM's, resolved    PLAN:  -No acute need for RRT but will monitor closely  -This patient is a poor long-term dialysis candidate given her liver disease and other comorbidities  -Continue oral fluid restriction less than 1.2 L/day  -Hold diueretics  -Continue p.o. bicarbonate supplements 1950 mg 3 times daily, goal bicarbonate of >22.  Will likely need maintenance p.o.  bicarb supplements  -Continue vitamin D 3 1000 IUs daily  -Antibiotics per primary service  -No IV iron given active infection, ok to give PO iron supplements  -Avoid further IV contrast, nephrotoxins, NSAIDs  -Dose all meds for decreased GFR  -Other management per primary service

## 2024-05-23 NOTE — PROGRESS NOTES
[facility] Daily Progress Note    Date of Service  5/23/2024    [facility] Team: [facility] IM Blue Team   Attending: BRADY Jones M.d.  Senior Resident: Dr. Schulte  Intern:  Dr. Richards  Contact Number: [phone]    Chief Complaint  April Alicia is a 64 y.o. female admitted 5/16/2024 with vaginal bleeding/hematuria    Hospital Course  April Alicia is a 64 y.o. 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 She presented 5/16/2024 with fevers, chills, hematuria, and shortness of breath. In the ER, she was tachycardic, febrile, requiring 1-2 lt O2. CT imaging showed a left lower lobe consolidation suggestive of pneumonia and a small pleural effusion. CT abdomen without significant ascites, had bladder wall thickening without kidney abnormalities. Had a leukocytosis > 14,000, sodium 131, creatinine 1.42, lactic acid of 2.6. urinalysis with significant hematuria and inflammatory changes. Admitted for sepsis due to HAP and UTI. Started on Linezolid and Zosyn.  Urine culture growing MSSA 1 set of blood cultures growing strep pneumo.  Linezolid was later discontinued and the patient was clinically improving on IV Zosyn.  However, her renal function continued to worsen even with the withholding of diuretics and a small NS bolus.  Nephrology was consulted.     Zosyn was switched to ceftriaxone given MSSA on 5/19/24. Given risk of C diff, switched to cefuroxime oral on 5/20/24.     Interval Problem Update  NAEO  Patient has been doing better since starting lactulose, rifaximin.  Patient is easily arousable, awake to voice.  Mentation ANO x 4.  Nephrology has been following for ELIZABETH, secondary to sepsis, contrast agent.   Creatinine has down trended, Currently at 1.74 with GFR 32.  Patient has been continued on sodium bicarb supplementation.  Patient has refused SNF, will be discharged to home with home health.  Home health accepted patient today.  UA ordered today to note casts.     I have discussed this patient's plan of care and discharge plan at IDT rounds today with Case Management, Nursing, Nursing leadership, and other members of the IDT team.    Consultants/Specialty  Nephrology    Code Status  Full Code    Disposition  The patient is not medically cleared for discharge to home or a post-acute facility.    Patient pending nephrology sign out for disposition.   I have placed the appropriate orders for post-discharge needs.    Review of Systems  Review of Systems   Constitutional:  Negative for chills and fever.   Respiratory:  Negative for cough, sputum production, shortness of breath and wheezing.    Cardiovascular:  Negative for chest pain, palpitations, orthopnea and leg swelling.   Gastrointestinal:  Positive for abdominal pain. Negative for constipation, diarrhea, nausea and vomiting.        Physical Exam  Temp:  [36.5 °C (97.7 °F)-36.9 °C (98.4 °F)] 36.5 °C (97.7 °F)  Pulse:  [75-84] 83  Resp:  [17-18] 17  BP: (114-142)/(59-92) 130/79  SpO2:  [94 %-98 %] 94 %    Physical Exam  Constitutional:       General: She is not in acute distress.     Appearance: She is not ill-appearing.   HENT:      Head: Normocephalic.      Mouth/Throat:      Mouth: Mucous membranes are moist.   Eyes:      General: No scleral icterus.        Right eye: No discharge.         Left eye: No discharge.   Cardiovascular:      Rate and Rhythm: Normal rate and regular rhythm.      Pulses: Normal pulses.      Heart sounds: Normal heart sounds.   Pulmonary:      Effort: Pulmonary effort is normal.      Breath sounds: Normal breath sounds.   Abdominal:      Palpations: Abdomen is soft.      Tenderness: There is no guarding or rebound.      Comments: Mild  to moderate lower abdominal tenderness to palpation improving since yesterday   Musculoskeletal:      Comments: Mild edema RLE, LLE BKA.   Skin:     General: Skin is warm and dry.      Coloration: Skin is not jaundiced.   Neurological:      Mental Status: She is alert and oriented to person, place, and time. Mental status is at baseline.   Psychiatric:         Mood and Affect: Mood normal.          Fluids    Intake/Output Summary (Last 24 hours) at 5/23/2024 1431  Last data filed at 5/23/2024 1423  Gross per 24 hour   Intake 240 ml   Output 350 ml   Net -110 ml       Laboratory  Recent Labs     05/21/24  0350 05/22/24  0132   WBC 7.4 6.3   RBC 3.12* 3.26*   HEMOGLOBIN 8.2* 8.6*   HEMATOCRIT 24.9* 25.7*   MCV 79.8* 78.8*   MCH 26.3* 26.4*   MCHC 32.9 33.5   RDW 45.8 45.5   PLATELETCT 115* 140*   MPV 10.5 11.4     Recent Labs     05/22/24  0132 05/23/24  0715 05/23/24  1044   SODIUM 130* 134* 134*   POTASSIUM 4.0 3.8 3.8   CHLORIDE 99 103 102   CO2 21 22 21   GLUCOSE 158* 120* 117*   BUN 49* 41* 40*   CREATININE 2.90* 1.85* 1.74*   CALCIUM 7.2* 7.4* 7.6*       Imaging  US-ABDOMEN LTD (SOFT TISSUE)   Final Result      Small amount of ascites in the abdomen.      CT-ABDOMEN-PELVIS WITH   Final Result         1. New left lower lobe consolidation and small left pleural effusion. This is likely due to pneumonia.   2. Cirrhosis, portal hypertension, and splenomegaly.   3. Atherosclerosis including coronary artery disease.   4. Ascites.   5. Possible colitis of the cecum.      DX-CHEST-PORTABLE (1 VIEW)   Final Result      Hypoinflation and cardiomegaly. Mild edema cannot be excluded.           Assessment/Plan:    * Sepsis (HCC)- (present on admission)  Assessment & Plan  RESOLVED  This is Sepsis Present on admission  SIRS criteria identified on my evaluation include: Fever, with temperature greater than 100.9 deg F, Tachycardia, with heart rate greater than 90 BPM, and Leukocytosis, with WBC greater than 12,000  Clinical  indicators of end organ dysfunction include Lactic Acid greater than 2  Source is pulmonary and urinary.   Sepsis protocol initiated. Resuscitation volume of 2L ordered. Reason that resuscitation volume of less than 30ml/kg was ordered concern for fluid overload  IV antibiotics as appropriate for source of sepsis - on Linezolid and Zosyn given concern for HAP and UTI - see individual plans   Urine cultures grew S. aureus; blood cultures grew S pneumonia    Physical deconditioning  Assessment & Plan  Patient has had minimal mobilization during her stay in the hospital.  Also has left lower extremity BKA.  PT recommended SNF.  However patient refuses to have physical therapy.  PT has advised for home health given patient's reluctance to SNF, daughter aggreable as patient lives with daughter.   -dispo pending for clearance from nephrology.     Acute hypoxemic respiratory failure (HCC)  Assessment & Plan  Secondary to pneumonia. Stable O2 needs, minimal.   - Encourage IS   - See plan for HAP     AF (atrial fibrillation) (HCC)  Assessment & Plan  Rate controlled.   - Continue home dose metoprolol.    - Holding apixaban in the setting of hematuria    Hematuria  Assessment & Plan  RESOVLED  Likely in the setting of UTI. Confirmed not vaginal bleeding - hx of histerecetomy. Apixaban was held however continued during inpatient stay. Patient tolerating well, without hematuria.       Hyperglycemia  Assessment & Plan  Per chart review, it was noted the patient has a history of type 2 diabetes, but patient is now at prediabetes range.   - CTM fasting BG     Hospital-acquired pneumonia  Assessment & Plan  Patient with recent discharge from the [facility] several days ago.  Now presenting with a Tmax of 103.6, tachycardia, leukocytosis, a retrocardiac consolidation and positive procalcitonin. Minimal o2 requirements.   CT imaging -- New left lower lobe consolidation and small left pleural effusion.   Has hx of  pseudomonas in her prior infected joint (s/p BKA), not isolated in blood, urine  or sputum priorly. Hx of ESBL in urine.   Empirically covering with linezolid and Zosyn IV. Switched zosyn to C3, then switched to cefuroxime. Anticipted to complete abx on 5/24/24.     Nausea & vomiting- (present on admission)  Assessment & Plan  - IV and PO antiemetics     Lactic acid acidosis- (present on admission)  Assessment & Plan  Due to sepsis. Resolved.     Normocytic anemia- (present on admission)  Assessment & Plan  Prior iron studies was suggestive of iron deficiency anemia   - Monitor BM for melena - describes possible hx of melena  - Recheck anemia w/u    Hyponatremia- (present on admission)  Assessment & Plan  Likely hypovolemic - secondary to Lasix and her diuretics, also poor PO intake.  - Encouraging PO intake, dc fluids  - CTM; slowly improving    Acute cystitis with hematuria- (present on admission)  Assessment & Plan  Patient states that she is noticed some blood in urine over the last several days, along with suprapubic pain. Bladder wall thickening in CT without kidney abnormalities.   Concern for UTI - hx of ESBL in urine x2 S to zosyn.   -Continue cefuroxime  - Follow urine cultures (grew S. aureus)    Acute kidney injury (HCC)- (present on admission)  Assessment & Plan  Creatinine level on admission was 1.42, BUN/Cr is 15 but stills suspect prerenal in the setting of sepsis and prior diuretic use, there is no evidence of cirrhosis decompensation at this time suggesting hepatorenal etiology.   - Holding home dose Lasix, Aldactone, ACE/ARB  - Stopped IVF, continue encouraging PO fluids as patient is tolerating well   - Monitor     Alcoholic cirrhosis of liver with ascites (HCC)- (present on admission)  Assessment & Plan  CHILD class B.   Patient has a positive HCV antibody but negative RNA/NAAT - resolved infection.   Recent hospitalization for cirrhosis decompensation with anasarca. No paracentesis needed. No  hx of GIB/EV.   No concern for acute decompensation, no ascites/minimal seen in CT this time.   - Started lactulose 5/21 with improvement in mentation  - Holding aldactone and lasix   - Monitor I and Os strictly         VTE prophylaxis: Eliquis     I have performed a physical exam and reviewed and updated ROS and Plan today (5/23/2024). In review of yesterday's note (5/22/2024), there are no changes except as documented above.

## 2024-05-23 NOTE — DISCHARGE PLANNING
Spoke with patient, she became angry when I suggested a SNF for physical therapy prior to going home, said she has a daughter and granddaughter at home that can help, I called her daughter and she agrees, She and patients granddaughter can help get her up in her wheelchair and back to bed.    [facility] has accepted patient for PT/OT

## 2024-05-23 NOTE — CARE PLAN
Problem: Knowledge Deficit - Standard  Goal: Patient and family/care givers will demonstrate understanding of plan of care, disease process/condition, diagnostic tests and medications  Outcome: Progressing     Problem: Pain - Standard  Goal: Alleviation of pain or a reduction in pain to the patient’s comfort goal  Outcome: Progressing     Problem: Skin Integrity  Goal: Skin integrity is maintained or improved  Outcome: Progressing     Problem: Fall Risk  Goal: Patient will remain free from falls  Outcome: Progressing   The patient is Stable - Low risk of patient condition declining or worsening    Shift Goals  Clinical Goals: safety, pain management, ADL's  Patient Goals: to go home  Family Goals: gisele

## 2024-05-23 NOTE — THERAPY
Physical Therapy   Daily Treatment     Patient Name: April Alicia  Age:  64 y.o., Sex:  female  Medical Record #: [id number]  Today's Date: 5/23/2024     Precautions  Precautions: Fall Risk    Assessment  Pt seen for PT tx session with mobility detailed down below. Pt demonstrates increased weakness today compared to last session, needing Min-Mod A for mobility. Pt is currently unable to safely transfer to the bedside chair. Pt's function has been fluctuating throughout this admission and she cannot consistently complete mobility needed for safe discharge home. Recommend post acute placement at this time to maximize functional independence and ensure a consistent, safe level of function at home. Will follow.     Plan    Treatment Plan Status: Continue Current Treatment Plan  Type of Treatment: Bed Mobility, Neuro Re-Education / Balance, Self Care / Home Evaluation, Therapeutic Activities, Therapeutic Exercise, Stair Training  Treatment Frequency: 3 Times per Week  Treatment Duration: Until Therapy Goals Met    DC Equipment Recommendations: Unable to determine at this time  Discharge Recommendations: Recommend post-acute placement for additional physical therapy services prior to discharge home        Vitals   O2 (LPM) 0   O2 Delivery Device None - Room Air   Non Verbal Descriptors   Non Verbal Scale  Crying   Balance   Sitting Balance (Static) Fair   Sitting Balance (Dynamic) Fair -   Standing Balance (Static) Poor +   Standing Balance (Dynamic) Poor   Weight Shift Sitting Fair   Skilled Intervention Verbal Cuing   Comments HHA in standing   Bed Mobility    Supine to Sit Minimal Assist   Scooting Minimal Assist   Rolling Standby Assist   Gait Analysis   Gait Level Of Assist Unable to Participate   Functional Mobility   Sit to Stand Moderate Assist   Bed, Chair, Wheelchair Transfer Moderate Assist   Transfer Method Stand Pivot   Mobility eob>chair   Skilled Intervention Verbal Cuing;Tactile Cuing;Sequencing    Comments cues for hand placement and sequencing   6 Clicks Assessment - How much HELP from from another person do you currently need... (If the patient hasn't done an activity recently, how much help from another person do you think he/she would need if he/she tried?)   Turning from your back to your side while in a flat bed without using bedrails? 3   Moving from lying on your back to sitting on the side of a flat bed without using bedrails? 3   Moving to and from a bed to a chair (including a wheelchair)? 2   Standing up from a chair using your arms (e.g., wheelchair, or bedside chair)? 2   Walking in hospital room? 1   Climbing 3-5 steps with a railing? 1   6 clicks Mobility Score 12   Short Term Goals    Short Term Goal # 1 pt will complete spt with spv in 6 tx for functional mobility.   Goal Outcome # 1 Progressing slower than expected   Short Term Goal # 2 pt will propel self 150 ft in wc with spv in 6 tx for household distances.   Goal Outcome # 2 Progressing as expected   Physical Therapy Treatment Plan   Physical Therapy Treatment Plan Continue Current Treatment Plan   Anticipated Discharge Equipment and Recommendations   DC Equipment Recommendations Unable to determine at this time   Discharge Recommendations Recommend post-acute placement for additional physical therapy services prior to discharge home   Interdisciplinary Plan of Care Collaboration   IDT Collaboration with  Nursing   Patient Position at End of Therapy Seated;Chair Alarm On;Tray Table within Reach;Call Light within Reach;Phone within Reach   Collaboration Comments RN updated   Session Information   Date / Session Number  5/23- 4 (4/3, 5/23)            This is a 66 year old right hand dominant male seen sleeping, but easily arousable, POD# 4 status post left crani for evacuation of left SDH.     1. Continue neuro checks  2. Continue wound checks  3. Ct head without contrast Jase 3-   4. Tylenol for pain   5. Pneumatic compression device on while in bed please   6. Plan was discussed with Dr Mary Garcia  This is a 66 year old right hand dominant male seen sleeping, but easily arousable, POD# 4 status post left crani for evacuation of left SDH.     1. Continue q4 neuro checks  2. Continue wound checks  3. Ct head without contrast Jase 3-   4. Tylenol for pain   5. Pneumatic compression device on while in bed please   6. Dispo planning  7. Plan was discussed with Dr Mary Garcia

## 2024-05-23 NOTE — DISCHARGE PLANNING
0830  DPA placed HH Choice - pending orders    1032  DPA sent HH referral to [facility].     1520  DPA received SNF Choice and placed it in Pts media.

## 2024-05-23 NOTE — CARE PLAN
The patient is Stable - Low risk of patient condition declining or worsening    Shift Goals  Clinical Goals: safety, pain management, ADL's  Patient Goals: go home tomorrow  Family Goals: gisele    Progress made toward(s) clinical / shift goals:    Problem: Knowledge Deficit - Standard  Goal: Patient and family/care givers will demonstrate understanding of plan of care, disease process/condition, diagnostic tests and medications  Description: Target End Date:  1-3 days or as soon as patient condition allows    Document in Patient Education    1.  Patient and family/caregiver oriented to unit, equipment, visitation policy and means for communicating concern  2.  Complete/review Learning Assessment  3.  Assess knowledge level of disease process/condition, treatment plan, diagnostic tests and medications  4.  Explain disease process/condition, treatment plan, diagnostic tests and medications  Outcome: Progressing     Problem: Fall Risk  Goal: Patient will remain free from falls  Description: Target End Date:  Prior to discharge or change in level of care    Document interventions on the Juliana Dickey Fall Risk Assessment    1.  Assess for fall risk factors  2.  Implement fall precautions  Outcome: Progressing       Patient is not progressing towards the following goals:

## 2024-05-23 NOTE — DISCHARGE PLANNING
Spoke with patient again, states her daughter called her and said she got another job and wants her to go to a SNF, patient agrees, choice presented, chose [facility]. Medical team notified.

## 2024-05-23 NOTE — DISCHARGE PLANNING
ATTN: Case Management  RE: Referral for Home Health    As of 5/23/24, we have accepted the Home Health referral for the patient listed above.    A [facility] clinician will be out to see the patient within 48 hours. If you have any questions or concerns regarding the patient's transition to Home Health, please do not hesitate to contact us at [phone].      We look forward to collaborating with you,  [facility] Team   Satisfactory

## 2024-05-24 LAB
ALBUMIN SERPL BCP-MCNC: 1.9 G/DL (ref 3.2–4.9)
ALBUMIN/GLOB SERPL: 0.5 G/DL
ALP SERPL-CCNC: 163 U/L (ref 30–99)
ALT SERPL-CCNC: 8 U/L (ref 2–50)
ANION GAP SERPL CALC-SCNC: 8 MMOL/L (ref 7–16)
AST SERPL-CCNC: 17 U/L (ref 12–45)
BILIRUB SERPL-MCNC: 0.4 MG/DL (ref 0.1–1.5)
BUN SERPL-MCNC: 37 MG/DL (ref 8–22)
CALCIUM ALBUM COR SERPL-MCNC: 9.1 MG/DL (ref 8.5–10.5)
CALCIUM SERPL-MCNC: 7.4 MG/DL (ref 8.5–10.5)
CHLORIDE SERPL-SCNC: 105 MMOL/L (ref 96–112)
CO2 SERPL-SCNC: 22 MMOL/L (ref 20–33)
CREAT SERPL-MCNC: 1.57 MG/DL (ref 0.5–1.4)
GFR SERPLBLD CREATININE-BSD FMLA CKD-EPI: 37 ML/MIN/1.73 M 2
GLOBULIN SER CALC-MCNC: 3.9 G/DL (ref 1.9–3.5)
GLUCOSE SERPL-MCNC: 153 MG/DL (ref 65–99)
POTASSIUM SERPL-SCNC: 3.8 MMOL/L (ref 3.6–5.5)
PROT SERPL-MCNC: 5.8 G/DL (ref 6–8.2)
SODIUM SERPL-SCNC: 135 MMOL/L (ref 135–145)

## 2024-05-24 PROCEDURE — A9270 NON-COVERED ITEM OR SERVICE: HCPCS | Performed by: INTERNAL MEDICINE

## 2024-05-24 PROCEDURE — 700102 HCHG RX REV CODE 250 W/ 637 OVERRIDE(OP): Performed by: INTERNAL MEDICINE

## 2024-05-24 PROCEDURE — 700102 HCHG RX REV CODE 250 W/ 637 OVERRIDE(OP): Performed by: STUDENT IN AN ORGANIZED HEALTH CARE EDUCATION/TRAINING PROGRAM

## 2024-05-24 PROCEDURE — 36415 COLL VENOUS BLD VENIPUNCTURE: CPT

## 2024-05-24 PROCEDURE — 700102 HCHG RX REV CODE 250 W/ 637 OVERRIDE(OP)

## 2024-05-24 PROCEDURE — A9270 NON-COVERED ITEM OR SERVICE: HCPCS

## 2024-05-24 PROCEDURE — 97535 SELF CARE MNGMENT TRAINING: CPT

## 2024-05-24 PROCEDURE — 80053 COMPREHEN METABOLIC PANEL: CPT

## 2024-05-24 PROCEDURE — 770001 HCHG ROOM/CARE - MED/SURG/GYN PRIV*

## 2024-05-24 PROCEDURE — 99232 SBSQ HOSP IP/OBS MODERATE 35: CPT | Mod: GC | Performed by: HOSPITALIST

## 2024-05-24 PROCEDURE — A9270 NON-COVERED ITEM OR SERVICE: HCPCS | Performed by: STUDENT IN AN ORGANIZED HEALTH CARE EDUCATION/TRAINING PROGRAM

## 2024-05-24 RX ORDER — ACETAMINOPHEN 325 MG/1
650 TABLET ORAL EVERY 6 HOURS
Status: DISCONTINUED | OUTPATIENT
Start: 2024-05-24 | End: 2024-05-24

## 2024-05-24 RX ORDER — SODIUM BICARBONATE 650 MG/1
1300 TABLET ORAL 3 TIMES DAILY
Status: DISCONTINUED | OUTPATIENT
Start: 2024-05-24 | End: 2024-05-25 | Stop reason: HOSPADM

## 2024-05-24 RX ORDER — ACETAMINOPHEN 500 MG
500 TABLET ORAL EVERY 6 HOURS
Status: DISCONTINUED | OUTPATIENT
Start: 2024-05-24 | End: 2024-05-24

## 2024-05-24 RX ORDER — ACETAMINOPHEN 325 MG/1
650 TABLET ORAL EVERY 8 HOURS
Status: DISCONTINUED | OUTPATIENT
Start: 2024-05-24 | End: 2024-05-25 | Stop reason: HOSPADM

## 2024-05-24 RX ADMIN — ACETAMINOPHEN 650 MG: 325 TABLET, FILM COATED ORAL at 14:45

## 2024-05-24 RX ADMIN — RIFAXIMIN 550 MG: 550 TABLET ORAL at 06:03

## 2024-05-24 RX ADMIN — CEFUROXIME AXETIL 500 MG: 500 TABLET ORAL at 06:03

## 2024-05-24 RX ADMIN — ACETAMINOPHEN 650 MG: 325 TABLET, FILM COATED ORAL at 07:16

## 2024-05-24 RX ADMIN — OMEPRAZOLE 40 MG: 20 CAPSULE, DELAYED RELEASE ORAL at 06:03

## 2024-05-24 RX ADMIN — ATORVASTATIN CALCIUM 20 MG: 20 TABLET, FILM COATED ORAL at 21:22

## 2024-05-24 RX ADMIN — CEFUROXIME AXETIL 500 MG: 500 TABLET ORAL at 16:33

## 2024-05-24 RX ADMIN — SODIUM BICARBONATE 1300 MG: 650 TABLET ORAL at 16:32

## 2024-05-24 RX ADMIN — SODIUM BICARBONATE 1300 MG: 650 TABLET ORAL at 12:38

## 2024-05-24 RX ADMIN — ACETAMINOPHEN 650 MG: 325 TABLET, FILM COATED ORAL at 21:22

## 2024-05-24 RX ADMIN — OXYCODONE 5 MG: 5 TABLET ORAL at 18:07

## 2024-05-24 RX ADMIN — OXYCODONE 5 MG: 5 TABLET ORAL at 06:03

## 2024-05-24 RX ADMIN — SODIUM BICARBONATE 1950 MG: 650 TABLET ORAL at 06:03

## 2024-05-24 RX ADMIN — APIXABAN 5 MG: 5 TABLET, FILM COATED ORAL at 16:33

## 2024-05-24 RX ADMIN — RIFAXIMIN 550 MG: 550 TABLET ORAL at 16:33

## 2024-05-24 RX ADMIN — APIXABAN 5 MG: 5 TABLET, FILM COATED ORAL at 06:03

## 2024-05-24 RX ADMIN — OXYCODONE 5 MG: 5 TABLET ORAL at 11:24

## 2024-05-24 RX ADMIN — Medication 1000 UNITS: at 06:03

## 2024-05-24 ASSESSMENT — ENCOUNTER SYMPTOMS
WHEEZING: 0
ABDOMINAL PAIN: 1
NAUSEA: 0
PALPITATIONS: 0
FEVER: 0
SHORTNESS OF BREATH: 0
VOMITING: 0
COUGH: 0
CHILLS: 0
CONSTIPATION: 0
ORTHOPNEA: 0
SPUTUM PRODUCTION: 0
DIARRHEA: 0

## 2024-05-24 ASSESSMENT — PAIN DESCRIPTION - PAIN TYPE
TYPE: CHRONIC PAIN

## 2024-05-24 ASSESSMENT — COGNITIVE AND FUNCTIONAL STATUS - GENERAL
SUGGESTED CMS G CODE MODIFIER DAILY ACTIVITY: CK
DAILY ACTIVITIY SCORE: 17
DRESSING REGULAR LOWER BODY CLOTHING: A LOT
HELP NEEDED FOR BATHING: A LOT
PERSONAL GROOMING: A LITTLE
DRESSING REGULAR UPPER BODY CLOTHING: A LITTLE
TOILETING: A LITTLE

## 2024-05-24 NOTE — CARE PLAN
Problem: Knowledge Deficit - Standard  Goal: Patient and family/care givers will demonstrate understanding of plan of care, disease process/condition, diagnostic tests and medications  Outcome: Progressing     Problem: Pain - Standard  Goal: Alleviation of pain or a reduction in pain to the patient’s comfort goal  Outcome: Progressing     Problem: Skin Integrity  Goal: Skin integrity is maintained or improved  Outcome: Progressing     Problem: Fall Risk  Goal: Patient will remain free from falls  Outcome: Progressing   The patient is Stable - Low risk of patient condition declining or worsening    Shift Goals  Clinical Goals: Pain management, maintain skin integrity  Patient Goals: Pain control, sleep  Family Goals: EDMUND

## 2024-05-24 NOTE — PROGRESS NOTES
[facility] -  PROGRESS NOTE               Author: Scott Roper D.O. Date & Time: 5/24/2024  10:54 AM     HPI:  64yoF with PMH significant for HTN, DM II, alcoholic cirrhosis with ascites, atrial fibrillation on anticoagulation, history of left-sided BKA, recent hospitalization for decompensated cirrhosis, admitted with complaints of fevers chills and SOB x 2 days and now with ELIZABETH and electrolyte abnormalities.  On admission patient was found to have sepsis thought to be secondary to hospital-acquired pneumonia and UTI and so she was started on broad-spectrum antibiotics with linezolid and Zosyn.  She also has bacteremia with Streptococcus pneumoniae and urine culture with MSSA.  She did undergo a CT abdomen/pelvis with IV contrast 5/16/2024 that showed new left lower lobe lung consolidation and small left pleural effusion likely due to pneumonia, cirrhosis and portal hypertension and splenomegaly, and ascites, and possible colitis of the cecum.  On admission her creatinine was 1.4 and prior to that during her recent hospital admission for decompensated cirrhosis earlier this month her creatinine was ~0.7 (was 0.7 on 5/14/2024 on discharge).  Her creatinine increased to 1.67 on 5/17/2024 and then yesterday it increased to 2.28 and today it is higher at 3.03.  She did receive an NS bolus yesterday.  Her sodium this morning is low at 122 and her potassium is elevated at 5.5 and she does have metabolic acidosis.  Unclear if her UOP is being documented accurately but 310 cc over the past 24 hours is recorded.  She denies any F/C/N/V/CP/SOB, + LE edema, + abdominal pain, + loose stools, denies any HA/vision changes.    DAILY NEPHROLOGY SUMMARY:  5/20 - No acute events overnight, vitals stable, remains oliguric w/  in the past 24 hours. Overall feels improved compared to yesterday. Slow speech.  5/21: No acute events overnight, vital signs stable SBP 100s-110s, on RA.  Improvement in UOP  "with 1275 cc x 24 hrs.  Significant improvement in labs with  (121), bicarb 19 (16), without AG, creatinine 3.32 (3.55).  5/22: No acute events overnight, vital signs stable SBP 100s-120s, on room air.   cc X 24 hours.  Labs stable with improvement in Na 130(127), creatinine 2.9 (3.32).  Corrected calcium 9.0, Phos 4.4, K 4.0.  5/23: No acute events overnight, vital signs are stable with SBP's 110s-140s, on room air.   cc X 24 hours, however nursing staff patient wet the bed significantly.  Electrolytes WNL, Improvement of Bicarb to 22 without gap.  5/24: No acute vents overnight, vitals stable w/ -150s, on RA. UOP 1150 x 24 hrs. Improvement in Cr to 1.57 (1.74), bicarb 22 without gap.     REVIEW OF SYSTEMS:    10 point ROS reviewed and is as per HPI/daily summary or otherwise negative    PMH/PSH/SH/FH: Reviewed and unchanged since admission note  CURRENT MEDICATIONS: Reviewed from admission to present day    VS:  /73   Pulse 96   Temp 36.5 °C (97.7 °F) (Temporal)   Resp 16   Ht 1.702 m (5' 7\")   Wt 111 kg (244 lb)   LMP 06/02/2008   SpO2 96%   BMI 38.22 kg/m²   Physical Exam  Constitutional:       General: She is not in acute distress.  HENT:      Head: Normocephalic and atraumatic.      Nose: Nose normal.      Mouth/Throat:      Mouth: Mucous membranes are moist.   Eyes:      Extraocular Movements: Extraocular movements intact.      Conjunctiva/sclera: Conjunctivae normal.      Pupils: Pupils are equal, round, and reactive to light.   Cardiovascular:      Rate and Rhythm: Normal rate and regular rhythm.      Heart sounds: No murmur heard.     No friction rub. No gallop.   Pulmonary:      Effort: Pulmonary effort is normal.      Breath sounds: Normal breath sounds. No wheezing, rhonchi or rales.   Abdominal:      General: There is no distension.      Palpations: Abdomen is soft.      Tenderness: There is abdominal tenderness (mild, lower abdomen). There is no guarding or " rebound.   Musculoskeletal:      Right lower leg: Edema (trace) present.      Comments: S/p L BKA   Skin:     General: Skin is warm.   Neurological:      Mental Status: She is alert.      Comments: Slow speech   Psychiatric:         Mood and Affect: Mood normal.         Behavior: Behavior normal.         Fluids:  In: 1080 [P.O.:1080]  Out: 1150     LABS:  Recent Labs     05/23/24  0715 05/23/24  1044 05/24/24  0056   SODIUM 134* 134* 135   POTASSIUM 3.8 3.8 3.8   CHLORIDE 103 102 105   CO2 22 21 22   GLUCOSE 120* 117* 153*   BUN 41* 40* 37*   CREATININE 1.85* 1.74* 1.57*   CALCIUM 7.4* 7.6* 7.4*       IMPRESSION:  # ELIZABETH--creatinine 0.7 on 5/14/2024 during her previous visit  -ELIZABETH multifactorial likely related to LORETTA and some relative hypotension and possible prerenal etiology  -Low concern for HRS at this time  -Improvement in UOP and Cr with supportive measures,  #Hyponatremia--Resolved  -Diuretics now held, patient also had been on spironolactone  -Had several loose bowel movements related to lactulose  -Urine osmolality elevated 332 and urine Na low  #Hyperkalemia--2/2 ELIZABETH, resolved  -Treated medically with Lokelma  #Metabolic acidosis--resoled  -Continuing p.o. bicarb supplements  -Provided 1 amp of bicarb 5/20  #Leukocytosis--present on admission and now resolved  -On antibiotics for Streptococcus bacteremia, hospital-acquired pneumonia, and MSSA UTI  #Anemia--iron sat low  #Thrombocytopenia--likely secondary to liver disease, monitor  #Hypocalcemia--PTH elevated and Vit D low, resolved  -Repleted appropriately w/ PO/IV supplementation  -On vitamin D supp.  #Alcoholic cirrhosis--with ascites  -Diuretics currently held  #Hyperphosphatemia--secondary to ELIZABETH, resolved with PhosLo  #Hypomagnesemia--Likely in the setting of loose BM's, resolved    PLAN:  -No acute need for RRT but will monitor closely  -This patient is a poor long-term dialysis candidate given her liver disease and other comorbidities  -Continue  oral fluid restriction less than 1.2 L/day  -Hold diueretics  -Decreasing p.o. bicarbonate supplements to 1300 mg 3 times daily, goal bicarbonate of >22.  Will likely need maintenance p.o. bicarb supplements.  Can consider further down-titration per primary team  -Continue vitamin D 3 1000 IUs daily  -Antibiotics per primary service  -No IV iron given active infection, ok to give PO iron supplements  -Avoid further IV contrast, nephrotoxins, NSAIDs  -Dose all meds for decreased GFR  -Other management per primary service  -Nephrology will sign off    Thank you for consulting our service, if any questions or concerns arise and/or need for reconsult please do not hesitate to reach out

## 2024-05-24 NOTE — THERAPY
Occupational Therapy  Daily Treatment     Patient Name: April Alicia  Age:  64 y.o., Sex:  female  Medical Record #: [id number]  Today's Date: 5/24/2024     Precautions  Precautions: Fall Risk  Comments: L AKA    Assessment    Pt seen for OT treatment. Pt donned underwear with SBA, donned shoes with min A, washed face with SBA, put in dentures with SBA, and performed ADL transfer w/ mod A. Pt required increased assist for ADL transfer this session compared to prior session. Pt current functional performance limited by impaired activity tolerance, impaired balance, and generalized weakness. Pt will continue to benefit from skilled OT while admitted to acute care.     Plan    Treatment Plan Status: Continue Current Treatment Plan  Type of Treatment: Self Care / Activities of Daily Living, Adaptive Equipment, Neuro Re-Education / Balance, Therapeutic Exercises, Therapeutic Activity  Treatment Frequency: 3 Times per Week  Treatment Duration: Until Therapy Goals Met    DC Equipment Recommendations: Bed Side Commode  Discharge Recommendations: Recommend post-acute placement for additional occupational therapy services prior to discharge home     Objective     05/24/24 1508   Vitals   Vitals Comments some dizziness upon sitting EOB, vitals stable   Pain   Pain Scales 0 to 10 Scale    Pain 0 - 10 Group   Therapist Pain Assessment Post Activity Pain Same as Prior to Activity;Nurse Notified  (not rated, agreeable to session)   Non Verbal Descriptors   Non Verbal Scale  Calm   Cognition    Cognition / Consciousness WDL   Speech/ Communication Hard of Hearing   Comments Very pleasant, cooperative, receptive to education   Other Treatments   Other Treatments Provided Continued education regarding the role of OT and the pathology of bedrest. Discussed the importance of getting up to the BSC instead of using the bedpan or purewick.   Balance   Sitting Balance (Static) Fair   Sitting Balance (Dynamic) Fair -   Standing  "Balance (Static) Poor +   Standing Balance (Dynamic) Poor   Weight Shift Sitting Fair   Weight Shift Standing Absent   Skilled Intervention Verbal Cuing;Tactile Cuing;Postural Facilitation   Comments HHA for transfer   Bed Mobility    Supine to Sit Contact Guard Assist   Sit to Supine   (up to chair post)   Scooting Standby Assist   Rolling Standby Assist   Skilled Intervention Verbal Cuing;Facilitation   Comments HOB elevated per pt request   Activities of Daily Living   Eating Modified Independent   Grooming Supervision;Seated  (put in dentures, washed face/hands)   Lower Body Dressing Minimal Assist  (don underwear with pad rolling in bed with SBA, don shoe with min A)   Toileting   (received on bedpan, max A for toilet hygiene)   Skilled Intervention Verbal Cuing;Tactile Cuing;Facilitation   Functional Mobility   Sit to Stand Moderate Assist   Bed, Chair, Wheelchair Transfer Moderate Assist   Transfer Method Stand Pivot   Mobility EOB>chair   Skilled Intervention Verbal Cuing;Tactile Cuing;Sequencing;Facilitation   Comments w/ HHA   Visual Perception   Visual Perception  Not Tested   Activity Tolerance   Sitting in Chair up to w/c post   Sitting Edge of Bed >10 min   Standing <20 seconds   Comments limited by weakness   Patient / Family Goals   Patient / Family Goal #1 \"I want to go walking with my family again\"   Goal #1 Outcome Progressing slower than expected   Short Term Goals   Short Term Goal # 1 Pt will perform ADL transfer w/ supv   Goal Outcome # 1 Progressing slower than expected   Short Term Goal # 2 Pt will perform LB dressing w/ supv   Goal Outcome # 2 Progressing as expected   Short Term Goal # 3 Pt will perform toilet hygiene w/ supv   Goal Outcome # 3 Progressing slower than expected   Education Group   Education Provided Role of Occupational Therapist;Activities of Daily Living;Pathology of bedrest   Role of Occupational Therapist Patient Response Patient;Acceptance;Explanation;Verbal " Demonstration   ADL Patient Response Patient;Acceptance;Explanation;Demonstration;Verbal Demonstration;Action Demonstration   Pathology of Bedrest Patient Response Patient;Acceptance;Demonstration;Explanation;Verbal Demonstration;Action Demonstration

## 2024-05-24 NOTE — DISCHARGE PLANNING
DC Transport Scheduled    Transport Company Scheduled:  [employer]  Spoke with Colleen at [facility] to schedule transport.  [facility] Trip #: [id number]    Scheduled Date: 5/25/2024  Scheduled Time: 1400    Destination: [facility]   Destination address: [address] [place]  [place] [address]    Notified care team of scheduled transport via [employer].     If there are any changes needed to the DC transportation scheduled, please contact [facility] at ext. [phone] between the hours of 8050-1529 Mon-Fri. If outside those hours, contact the ED Case Manager at ext. [phone].

## 2024-05-24 NOTE — PROGRESS NOTES
[facility] Internal Medicine Daily Progress Note    Date of Service  5/24/2024    [facility] Team: [facility] IM Blue Team   Attending: BRADY Jones M.d.  Senior Resident: Dr. Schulte  Intern:  Dr. Richards  Contact Number: [phone]    Chief Complaint  April Alicia is a 64 y.o. female admitted 5/16/2024 with vaginal bleeding/hematuria    Hospital Course  April Alicia is a 64 y.o. 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 She presented 5/16/2024 with fevers, chills, hematuria, and shortness of breath. In the ER, she was tachycardic, febrile, requiring 1-2 lt O2. CT imaging showed a left lower lobe consolidation suggestive of pneumonia and a small pleural effusion. CT abdomen without significant ascites, had bladder wall thickening without kidney abnormalities. Had a leukocytosis > 14,000, sodium 131, creatinine 1.42, lactic acid of 2.6. urinalysis with significant hematuria and inflammatory changes. Admitted for sepsis due to HAP and UTI. Started on Linezolid and Zosyn.  Urine culture growing MSSA 1 set of blood cultures growing strep pneumo.  Linezolid was later discontinued and the patient was clinically improving on IV Zosyn.  However, her renal function continued to worsen even with the withholding of diuretics and a small NS bolus.  Nephrology was consulted. Nephrology has been managing patient with fluid restriction and correcting renal function with sodium bicarbonate.     Zosyn was switched to ceftriaxone given MSSA on 5/19/24. Given risk of C diff, switched to cefuroxime oral on 5/20/24.     Interval Problem Update  NAEO  Patient has been doing better since starting lactulose, rifaximin.  Patient is easily  arousable, awake to voice.  Mentation ANO x 4.  Nephrology has been following for ELIZABETH, patient is continued on sodium bicarbonate and from discussion with nephrology team will attempt to decrease sodium bicarb. Please refer to nephrology note of the day for plan.   Otherwise patient awaits disposition to SNF.     I have discussed this patient's plan of care and discharge plan at IDT rounds today with Case Management, Nursing, Nursing leadership, and other members of the IDT team.    Consultants/Specialty  Nephrology    Code Status  Full Code    Disposition  The patient is not medically cleared for discharge to home or a post-acute facility.    Patient pending nephrology sign out for disposition.   I have placed the appropriate orders for post-discharge needs.    Review of Systems  Review of Systems   Constitutional:  Negative for chills and fever.   Respiratory:  Negative for cough, sputum production, shortness of breath and wheezing.    Cardiovascular:  Negative for chest pain, palpitations, orthopnea and leg swelling.   Gastrointestinal:  Positive for abdominal pain. Negative for constipation, diarrhea, nausea and vomiting.        Physical Exam  Temp:  [36.5 °C (97.7 °F)-36.6 °C (97.9 °F)] 36.5 °C (97.7 °F)  Pulse:  [74-96] 96  Resp:  [16-20] 16  BP: (123-152)/() 123/73  SpO2:  [94 %-98 %] 96 %    Physical Exam  Constitutional:       General: She is not in acute distress.     Appearance: She is not ill-appearing.   HENT:      Head: Normocephalic.      Mouth/Throat:      Mouth: Mucous membranes are moist.   Eyes:      General: No scleral icterus.        Right eye: No discharge.         Left eye: No discharge.   Cardiovascular:      Rate and Rhythm: Normal rate and regular rhythm.      Pulses: Normal pulses.      Heart sounds: Normal heart sounds.   Pulmonary:      Effort: Pulmonary effort is normal.      Breath sounds: Normal breath sounds.   Abdominal:      Palpations: Abdomen is soft.      Tenderness: There  is no guarding or rebound.      Comments: Mild to moderate lower abdominal tenderness to palpation improving since yesterday   Musculoskeletal:      Comments: Mild edema RLE, LLE BKA.   Skin:     General: Skin is warm and dry.      Coloration: Skin is not jaundiced.   Neurological:      Mental Status: She is alert and oriented to person, place, and time. Mental status is at baseline.   Psychiatric:         Mood and Affect: Mood normal.          Fluids    Intake/Output Summary (Last 24 hours) at 5/24/2024 1235  Last data filed at 5/24/2024 1000  Gross per 24 hour   Intake 1200 ml   Output 750 ml   Net 450 ml       Laboratory  Recent Labs     05/22/24  0132   WBC 6.3   RBC 3.26*   HEMOGLOBIN 8.6*   HEMATOCRIT 25.7*   MCV 78.8*   MCH 26.4*   MCHC 33.5   RDW 45.5   PLATELETCT 140*   MPV 11.4     Recent Labs     05/23/24  0715 05/23/24  1044 05/24/24  0056   SODIUM 134* 134* 135   POTASSIUM 3.8 3.8 3.8   CHLORIDE 103 102 105   CO2 22 21 22   GLUCOSE 120* 117* 153*   BUN 41* 40* 37*   CREATININE 1.85* 1.74* 1.57*   CALCIUM 7.4* 7.6* 7.4*       Imaging  US-ABDOMEN LTD (SOFT TISSUE)   Final Result      Small amount of ascites in the abdomen.      CT-ABDOMEN-PELVIS WITH   Final Result         1. New left lower lobe consolidation and small left pleural effusion. This is likely due to pneumonia.   2. Cirrhosis, portal hypertension, and splenomegaly.   3. Atherosclerosis including coronary artery disease.   4. Ascites.   5. Possible colitis of the cecum.      DX-CHEST-PORTABLE (1 VIEW)   Final Result      Hypoinflation and cardiomegaly. Mild edema cannot be excluded.           Assessment/Plan:    * Sepsis (HCC)- (present on admission)  Assessment & Plan  RESOLVED  This is Sepsis Present on admission  SIRS criteria identified on my evaluation include: Fever, with temperature greater than 100.9 deg F, Tachycardia, with heart rate greater than 90 BPM, and Leukocytosis, with WBC greater than 12,000  Clinical indicators of end organ  dysfunction include Lactic Acid greater than 2  Source is pulmonary and urinary.   Sepsis protocol initiated. Resuscitation volume of 2L ordered. Reason that resuscitation volume of less than 30ml/kg was ordered concern for fluid overload  IV antibiotics as appropriate for source of sepsis - on Linezolid and Zosyn given concern for HAP and UTI - see individual plans   Urine cultures grew S. aureus; blood cultures grew S pneumonia    Physical deconditioning  Assessment & Plan  Patient has had minimal mobilization during her stay in the hospital.  Also has left lower extremity BKA.  PT recommended SNF.  However patient refuses to have physical therapy.  PT has advised for home health given patient's reluctance to SNF, daughter aggreable as patient lives with daughter.   -dispo pending for clearance from nephrology.     Acute hypoxemic respiratory failure (HCC)  Assessment & Plan  Secondary to pneumonia. Stable O2 needs, minimal.   - Encourage IS   - See plan for HAP     AF (atrial fibrillation) (HCC)  Assessment & Plan  Rate controlled.   - Continue home dose metoprolol.    - Holding apixaban in the setting of hematuria    Hematuria  Assessment & Plan  RESOVLED  Likely in the setting of UTI. Confirmed not vaginal bleeding - hx of histerecetomy. Apixaban was held however continued during inpatient stay. Patient tolerating well, without hematuria.       Hyperglycemia  Assessment & Plan  Per chart review, it was noted the patient has a history of type 2 diabetes, but patient is now at prediabetes range.   - CTM fasting BG     Hospital-acquired pneumonia  Assessment & Plan  Patient with recent discharge from the [facility] several days ago.  Now presenting with a Tmax of 103.6, tachycardia, leukocytosis, a retrocardiac consolidation and positive procalcitonin. Minimal o2 requirements.   CT imaging -- New left lower lobe consolidation and small left pleural effusion.   Has hx of pseudomonas in her prior  infected joint (s/p BKA), not isolated in blood, urine  or sputum priorly. Hx of ESBL in urine.   Empirically covering with linezolid and Zosyn IV. Switched zosyn to C3, then switched to cefuroxime. Anticipted to complete abx on 5/45/24.     Nausea & vomiting- (present on admission)  Assessment & Plan  - IV and PO antiemetics     Lactic acid acidosis- (present on admission)  Assessment & Plan  Due to sepsis. Resolved.     Normocytic anemia- (present on admission)  Assessment & Plan  Prior iron studies was suggestive of iron deficiency anemia   - Monitor BM for melena - describes possible hx of melena  - Recheck anemia w/u    Hyponatremia- (present on admission)  Assessment & Plan  Likely hypovolemic - secondary to Lasix and her diuretics, also poor PO intake.  - Encouraging PO intake, dc fluids  - CTM; slowly improving    Acute cystitis with hematuria- (present on admission)  Assessment & Plan  Patient states that she is noticed some blood in urine over the last several days, along with suprapubic pain. Bladder wall thickening in CT without kidney abnormalities.   Concern for UTI - hx of ESBL in urine x2 S to zosyn.   -Continue cefuroxime  - Follow urine cultures (grew S. aureus)    Acute kidney injury (HCC)- (present on admission)  Assessment & Plan  Creatinine level on admission was 1.42, BUN/Cr is 15 but stills suspect prerenal in the setting of sepsis and prior diuretic use, there is no evidence of cirrhosis decompensation at this time suggesting hepatorenal etiology.   - Holding home dose Lasix, Aldactone, ACE/ARB  - Stopped IVF, continue encouraging PO fluids as patient is tolerating well   - Monitor     Alcoholic cirrhosis of liver with ascites (HCC)- (present on admission)  Assessment & Plan  CHILD class B.   Patient has a positive HCV antibody but negative RNA/NAAT - resolved infection.   Recent hospitalization for cirrhosis decompensation with anasarca. No paracentesis needed. No hx of GIB/EV.   No concern  for acute decompensation, no ascites/minimal seen in CT this time.   - Started lactulose 5/21 with improvement in mentation  - Holding aldactone and lasix   - Monitor I and Os strictly         VTE prophylaxis: Eliquis     I have performed a physical exam and reviewed and updated ROS and Plan today (5/24/2024). In review of yesterday's note (5/23/2024), there are no changes except as documented above.

## 2024-05-24 NOTE — CARE PLAN
The patient is Stable - Low risk of patient condition declining or worsening    Shift Goals  Clinical Goals: Pain management, maintain skin integrity  Patient Goals: Pain control, sleep  Family Goals: EDMUND    Progress made toward(s) clinical / shift goals:  Patient is AAOx4,, she is able to communicate her needs. Patient's skin remains intact. Patient here for sepsis, vital signs and labs remain stable. Bed low, locked and call light in reach.    Problem: Knowledge Deficit - Standard  Goal: Patient and family/care givers will demonstrate understanding of plan of care, disease process/condition, diagnostic tests and medications  Outcome: Progressing  Note: Patient educated on POC, patient's labs and vitals have remained stable. Patient pending SNF placement. Patient demonstrates understanding of unit policies and procedures. All questions answered.     Problem: Pain - Standard  Goal: Alleviation of pain or a reduction in pain to the patient’s comfort goal  Outcome: Progressing  Note: Medicated patient for pain per MAR. Pain reassessment in place. Patient educated on non-pharmacological ways to relieve pain.     Problem: Skin Integrity  Goal: Skin integrity is maintained or improved  Outcome: Progressing  Note: Patient does not ambulate d/t L AKA, patient is on a KIMMY mattress with a TAPs system, Q2 hour turns in place to prevent skin breakdown.  1.  Assess and monitor skin integrity, appearance and/or temperature  2.  Assess risk factors for impaired skin integrity and/or pressures ulcers  3.  Implement precautions to protect skin integrity in collaboration with interdisciplinary team  4.  Implement pressure ulcer prevention protocol if at risk for skin breakdown  5.  Confirm wound care consult if at risk for skin breakdown  6.  Ensure patient use of pressure relieving devices  (Low air loss bed, waffle overlay, heel protectors, ROHO cushion, etc)

## 2024-05-25 VITALS
RESPIRATION RATE: 20 BRPM | WEIGHT: 248.9 LBS | HEART RATE: 91 BPM | HEIGHT: 67 IN | TEMPERATURE: 97.5 F | SYSTOLIC BLOOD PRESSURE: 122 MMHG | OXYGEN SATURATION: 96 % | BODY MASS INDEX: 39.07 KG/M2 | DIASTOLIC BLOOD PRESSURE: 67 MMHG

## 2024-05-25 PROBLEM — J18.9 HOSPITAL-ACQUIRED PNEUMONIA: Status: RESOLVED | Noted: 2024-05-16 | Resolved: 2024-05-25

## 2024-05-25 PROBLEM — N30.01 ACUTE CYSTITIS WITH HEMATURIA: Status: RESOLVED | Noted: 2022-07-11 | Resolved: 2024-05-25
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
## 2024-05-25 PROBLEM — R78.81 BACTEREMIA DUE TO STREPTOCOCCUS PNEUMONIAE: Status: RESOLVED | Noted: 2024-05-18 | Resolved: 2024-05-25

## 2024-05-25 PROBLEM — R73.9 HYPERGLYCEMIA: Status: RESOLVED | Noted: 2024-05-16 | Resolved: 2024-05-25

## 2024-05-25 PROBLEM — B95.3 BACTEREMIA DUE TO STREPTOCOCCUS PNEUMONIAE: Status: RESOLVED | Noted: 2024-05-18 | Resolved: 2024-05-25

## 2024-05-25 PROBLEM — R31.9 HEMATURIA: Status: RESOLVED | Noted: 2024-05-16 | Resolved: 2024-05-25

## 2024-05-25 PROBLEM — A41.9 SEPSIS (HCC): Status: RESOLVED | Noted: 2024-05-16 | Resolved: 2024-05-25

## 2024-05-25 PROBLEM — E87.1 HYPONATREMIA: Status: RESOLVED | Noted: 2022-07-11 | Resolved: 2024-05-25

## 2024-05-25 PROBLEM — R11.2 NAUSEA & VOMITING: Status: RESOLVED | Noted: 2024-01-22 | Resolved: 2024-05-25

## 2024-05-25 PROBLEM — J96.01 ACUTE HYPOXEMIC RESPIRATORY FAILURE (HCC): Status: RESOLVED | Noted: 2024-05-17 | Resolved: 2024-05-25

## 2024-05-25 PROBLEM — N17.9 ACUTE KIDNEY INJURY (HCC): Status: RESOLVED | Noted: 2022-07-10 | Resolved: 2024-05-25

## 2024-05-25 PROBLEM — E87.20 LACTIC ACID ACIDOSIS: Status: RESOLVED | Noted: 2022-12-28 | Resolved: 2024-05-25

## 2024-05-25 PROBLEM — R40.0 DROWSINESS: Status: RESOLVED | Noted: 2024-05-20 | Resolved: 2024-05-25

## 2024-05-25 LAB
ALBUMIN SERPL BCP-MCNC: 1.9 G/DL (ref 3.2–4.9)
ALBUMIN/GLOB SERPL: 0.5 G/DL
ALP SERPL-CCNC: 145 U/L (ref 30–99)
ALT SERPL-CCNC: 11 U/L (ref 2–50)
ANION GAP SERPL CALC-SCNC: 8 MMOL/L (ref 7–16)
AST SERPL-CCNC: 16 U/L (ref 12–45)
BILIRUB SERPL-MCNC: 0.5 MG/DL (ref 0.1–1.5)
BUN SERPL-MCNC: 34 MG/DL (ref 8–22)
CALCIUM ALBUM COR SERPL-MCNC: 9.4 MG/DL (ref 8.5–10.5)
CALCIUM SERPL-MCNC: 7.7 MG/DL (ref 8.5–10.5)
CHLORIDE SERPL-SCNC: 105 MMOL/L (ref 96–112)
CO2 SERPL-SCNC: 23 MMOL/L (ref 20–33)
CREAT SERPL-MCNC: 1.3 MG/DL (ref 0.5–1.4)
GFR SERPLBLD CREATININE-BSD FMLA CKD-EPI: 46 ML/MIN/1.73 M 2
GLOBULIN SER CALC-MCNC: 3.8 G/DL (ref 1.9–3.5)
GLUCOSE SERPL-MCNC: 129 MG/DL (ref 65–99)
MAGNESIUM SERPL-MCNC: 2.1 MG/DL (ref 1.5–2.5)
POTASSIUM SERPL-SCNC: 4 MMOL/L (ref 3.6–5.5)
PROT SERPL-MCNC: 5.7 G/DL (ref 6–8.2)
SODIUM SERPL-SCNC: 136 MMOL/L (ref 135–145)

## 2024-05-25 PROCEDURE — 83735 ASSAY OF MAGNESIUM: CPT

## 2024-05-25 PROCEDURE — 700102 HCHG RX REV CODE 250 W/ 637 OVERRIDE(OP)

## 2024-05-25 PROCEDURE — A9270 NON-COVERED ITEM OR SERVICE: HCPCS

## 2024-05-25 PROCEDURE — 36415 COLL VENOUS BLD VENIPUNCTURE: CPT

## 2024-05-25 PROCEDURE — 80053 COMPREHEN METABOLIC PANEL: CPT

## 2024-05-25 PROCEDURE — 99239 HOSP IP/OBS DSCHRG MGMT >30: CPT | Mod: GC | Performed by: INTERNAL MEDICINE

## 2024-05-25 RX ORDER — ACETAMINOPHEN 325 MG/1
650 TABLET ORAL EVERY 8 HOURS
Qty: 30 TABLET | Refills: 0 | Status: SHIPPED | OUTPATIENT
Start: 2024-05-25

## 2024-05-25 RX ORDER — FERROUS SULFATE 325(65) MG
325 TABLET ORAL DAILY
Qty: 30 TABLET | Refills: 1 | Status: SHIPPED | OUTPATIENT
Start: 2024-05-25

## 2024-05-25 RX ORDER — OMEPRAZOLE 40 MG/1
40 CAPSULE, DELAYED RELEASE ORAL DAILY
Qty: 30 CAPSULE | Refills: 0 | Status: SHIPPED | OUTPATIENT
Start: 2024-05-26

## 2024-05-25 RX ORDER — OXYCODONE HYDROCHLORIDE 5 MG/1
5 TABLET ORAL EVERY 6 HOURS PRN
Qty: 30 TABLET | Refills: 0 | Status: SHIPPED | OUTPATIENT
Start: 2024-05-25 | End: 2024-05-25

## 2024-05-25 RX ORDER — GUAIFENESIN/DEXTROMETHORPHAN 100-10MG/5
10 SYRUP ORAL EVERY 6 HOURS PRN
Qty: 840 ML | Refills: 0 | Status: SHIPPED | OUTPATIENT
Start: 2024-05-25

## 2024-05-25 RX ORDER — OXYCODONE HYDROCHLORIDE 5 MG/1
5 TABLET ORAL EVERY 8 HOURS PRN
Qty: 14 TABLET | Refills: 0 | Status: SHIPPED | OUTPATIENT
Start: 2024-05-25 | End: 2024-06-01

## 2024-05-25 RX ORDER — SODIUM BICARBONATE 650 MG/1
650 TABLET ORAL 2 TIMES DAILY
Qty: 60 TABLET | Refills: 0 | Status: SHIPPED | OUTPATIENT
Start: 2024-05-25 | End: 2024-06-24

## 2024-05-25 RX ORDER — LACTULOSE 20 G/30ML
30 SOLUTION ORAL 3 TIMES DAILY
Qty: 450 ML | Refills: 3 | Status: SHIPPED | OUTPATIENT
Start: 2024-05-25

## 2024-05-25 RX ADMIN — OMEPRAZOLE 40 MG: 20 CAPSULE, DELAYED RELEASE ORAL at 04:02

## 2024-05-25 RX ADMIN — Medication 1000 UNITS: at 04:03

## 2024-05-25 RX ADMIN — SODIUM BICARBONATE 1300 MG: 650 TABLET ORAL at 04:03

## 2024-05-25 RX ADMIN — ACETAMINOPHEN 650 MG: 325 TABLET, FILM COATED ORAL at 04:03

## 2024-05-25 RX ADMIN — OXYCODONE 5 MG: 5 TABLET ORAL at 04:07

## 2024-05-25 RX ADMIN — APIXABAN 5 MG: 5 TABLET, FILM COATED ORAL at 04:03

## 2024-05-25 RX ADMIN — RIFAXIMIN 550 MG: 550 TABLET ORAL at 04:04

## 2024-05-25 RX ADMIN — OXYCODONE 5 MG: 5 TABLET ORAL at 10:41

## 2024-05-25 ASSESSMENT — FIBROSIS 4 INDEX: FIB4 SCORE: 2.21

## 2024-05-25 ASSESSMENT — PAIN DESCRIPTION - PAIN TYPE: TYPE: ACUTE PAIN

## 2024-05-25 NOTE — DISCHARGE SUMMARY
[facility] Internal Medicine Discharge Summary    Attending: Eric Viera M.d.  Senior Resident: Dr. Schulte  Intern:  Dr. Richards  Contact Number: [phone]    CHIEF COMPLAINT ON ADMISSION  Chief Complaint   Patient presents with    Vaginal Bleeding     Pt states she was discharged from the hospital Monday and started having vaginal bleeding that hasn't stopped         Reason for Admission  Hematuria, urinary tract infection    Admission Date  5/16/2024    CODE STATUS  Full Code    HPI & HOSPITAL COURSE  April Alicia is a 64 y.o. 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 She presented 5/16/2024 with fevers, chills, hematuria, and shortness of breath. In the ER, she was tachycardic, febrile, requiring 1-2L O2. CT imaging showed a left lower lobe consolidation suggestive of pneumonia and a small pleural effusion. CT abdomen without significant ascites, had bladder wall thickening without kidney abnormalities. Had a leukocytosis > 14,000, sodium 131, creatinine 1.42, lactic acid of 2.6. urinalysis with significant hematuria and inflammatory changes. Admitted for sepsis due to HAP and UTI. Started on Linezolid and Zosyn.  Urine culture growing MSSA with 1 set of blood cultures growing strep pneumo sensitive to cefotaxime and penicillin.  Linezolid was later discontinued and the patient was clinically improving on IV Zosyn.  However, her renal function continued to worsen even with the withholding of diuretics and a small NS bolus.  Nephrology was consulted. Nephrology has been managing patient with fluid restriction and correcting renal function with sodium bicarbonate. Sepsis resolved, and Zosyn was downgraded to ceftriaxone  given MSSA on 5/19/24. Given risk of C diff, switched to cefuroxime oral on 5/20/24.     Patient has a history of ascites, ultrasound was completed after CT and noted no significant ascites. However During hospital course patient developed confusion, and somnolence.  Patient was restarted on lactulose and rifaximin and noted improvement. Given renal injury, patients spironolactone and laxis were held. With improvement of renal function, patient restarted spironolactone and laxis at lower dose. There is a risk of hypokalcemia, therefore patient planned for CMP in 1 week.     Patient has been discharged on lactulose, rifaximin to be added depending on patient's insurance.      Therefore, she is discharged in good and stable condition to skilled nursing facility.    The patient met 2-midnight criteria for an inpatient stay at the time of discharge.    Discharge Date  5/25/24    Physical Exam on Day of Discharge    Constitutional:       General: She is not in acute distress.     Appearance: She is not ill-appearing.   HENT:      Head: Normocephalic.      Mouth/Throat:      Mouth: Mucous membranes are moist.   Eyes:      General: No scleral icterus.        Right eye: No discharge.         Left eye: No discharge.   Cardiovascular:      Rate and Rhythm: Normal rate and regular rhythm.      Pulses: Normal pulses.      Heart sounds: Normal heart sounds.   Pulmonary:      Effort: Pulmonary effort is normal.      Breath sounds: Normal breath sounds.   Abdominal:      Palpations: Abdomen is soft.      Tenderness: There is no guarding or rebound.      Comments: Mild to moderate lower abdominal tenderness to palpation improving since yesterday   Musculoskeletal:      Comments: Mild edema RLE, LLE BKA.   Skin:     General: Skin is warm and dry.      Coloration: Skin is not jaundiced.   Neurological:      Mental Status: She is alert and oriented to person, place, and time. Mental status is at baseline.   Psychiatric:         Mood  and Affect: Mood normal.        FOLLOW UP ITEMS POST DISCHARGE    Nephrology follow up  Monitor of anemia, started on iron tabs on DC  Follow-up of alcoholic cirrhosis with ascites  Potassium monitoring on spironolactone, laxis in one week from discharge    DISCHARGE DIAGNOSES  Principal Problem (Resolved):    Sepsis (HCC) (POA: Yes)  Active Problems:    Alcoholic cirrhosis of liver with ascites (HCC) (POA: Yes)    Normocytic anemia (POA: Yes)    AF (atrial fibrillation) (HCC) (POA: Unknown)    Physical deconditioning (POA: Unknown)  Resolved Problems:    Acute kidney injury (ELIZABETH) with acute tubular necrosis (ATN) (HCC) (POA: Yes)    Acute kidney injury (HCC) (POA: Yes)    Acute cystitis with hematuria (POA: Yes)    Hyponatremia (POA: Yes)    Lactic acid acidosis (POA: Yes)    Nausea & vomiting (POA: Yes)    Hospital-acquired pneumonia (POA: Unknown)    Hyperglycemia (POA: Unknown)    Hematuria (POA: Unknown)    Acute hypoxemic respiratory failure (HCC) (POA: Unknown)    Bacteremia due to Streptococcus pneumoniae (POA: Unknown)    Drowsiness (POA: Unknown)      FOLLOW UP  No future appointments.  No follow-up provider specified.    MEDICATIONS ON DISCHARGE     Medication List        ASK your doctor about these medications        Instructions   albuterol 108 (90 Base) MCG/ACT Aers inhalation aerosol   Inhale 2 Puffs every 4 hours. Every 4-6 hours prn  Dose: 2 Puff     atorvastatin 20 MG Tabs  Commonly known as: Lipitor   Take 1 Tablet by mouth every evening.  Dose: 20 mg     Eliquis 5 MG Tabs  Generic drug: apixaban   Take 1 Tablet by mouth 2 times a day. Indications: Thromboembolism secondary to Atrial Fibrillation  Dose: 5 mg     furosemide 40 MG Tabs  Commonly known as: Lasix   Take 1 Tablet by mouth every day for 30 days.  Dose: 40 mg     gabapentin 400 MG Caps  Commonly known as: Neurontin  Ask about: Which instructions should I use?   Take 400 mg by mouth 2 times a day.  Dose: 400 mg     losartan 100 MG  Tabs  Commonly known as: Cozaar  Ask about: Which instructions should I use?   Take 100 mg by mouth every day.  Dose: 100 mg     metFORMIN 500 MG Tabs  Commonly known as: Glucophage   Take 1,000 mg by mouth 2 times a day. 2 tablets= 1000mg  Dose: 1,000 mg     nitrofurantoin 100 MG Caps  Commonly known as: Macrobid   Take 1 Capsule by mouth 2 times a day.  Dose: 1 Capsule     ondansetron 4 MG Tbdp  Commonly known as: Zofran ODT   Take 1 Tablet by mouth every four hours as needed for Nausea/Vomiting (give PO if no IV route available).  Dose: 4 mg     oxyCODONE immediate release 10 MG immediate release tablet  Commonly known as: Roxicodone   Take 10 mg by mouth every 6 hours as needed for Severe Pain.  Dose: 10 mg     pantoprazole 20 MG tablet  Commonly known as: Protonix   Take 20 mg by mouth every day.  Dose: 20 mg     PRENATAL PO   Take 1 Tablet by mouth every day.  Dose: 1 Tablet     spironolactone 25 MG Tabs  Commonly known as: Aldactone   Take 1 Tablet by mouth every day for 30 days.  Dose: 25 mg              Allergies  No Known Allergies    DIET  Orders Placed This Encounter   Procedures    Diet Order Diet: Regular; Fluid modifications: (optional): 1200 ml Fluid Restriction     Standing Status:   Standing     Number of Occurrences:   1     Order Specific Question:   Diet:     Answer:   Regular [1]     Order Specific Question:   Fluid modifications: (optional)     Answer:   1200 ml Fluid Restriction [8]       ACTIVITY  As tolerated and directed by skilled nursing.  Weight bearing as tolerated    CONSULTATIONS  Nephrology Dr. Velázquez    PROCEDURES  No procedures completed during this hospital stay    LABORATORY  Lab Results   Component Value Date    SODIUM 136 05/25/2024    POTASSIUM 4.0 05/25/2024    CHLORIDE 105 05/25/2024    CO2 23 05/25/2024    GLUCOSE 129 (H) 05/25/2024    BUN 34 (H) 05/25/2024    CREATININE 1.30 05/25/2024    CREATININE 0.9 03/12/2009        Lab Results   Component Value Date    WBC 6.3  05/22/2024    HEMOGLOBIN 8.6 (L) 05/22/2024    HEMATOCRIT 25.7 (L) 05/22/2024    PLATELETCT 140 (L) 05/22/2024        Total time of the discharge process exceeds 45 minutes.

## 2024-05-25 NOTE — DISCHARGE INSTRUCTIONS
You have been admitted due to a bloodstream infection caused by pneumonia and urinary tract infection, and completed antibiotics  We have held certain medications that prevent fluid accumulation in your abdomen due to liver cirrhosis, as her renal function has improved we have restarted that.  Please ensure you complete a lab test to ensure your electrolytes are in balance.   Please follow-up with a primary care physician as soon as you can.

## 2024-05-25 NOTE — PROGRESS NOTES
Report received from NOC RN, bedside report completed and eyes on the pt. Lab results and notes have been reviewed. The pt is resting in bed and all needs are met at this time. The bed is locked/lowered, bed alarm is on, and the call light is within reach. Will continue to monitor for any changes.

## 2024-05-25 NOTE — CARE PLAN
The patient is Stable - Low risk of patient condition declining or worsening    Shift Goals  Clinical Goals: Pain management, skin integrity, rest  Patient Goals: rest  Family Goals: gisele    Progress made toward(s) clinical / shift goals:        Problem: Pain - Standard  Goal: Alleviation of pain or a reduction in pain to the patient’s comfort goal  Outcome: Progressing  Note: Pt pain treated with Oxycodone 5 mg with good results Pt able to rest comfortably after dinner, watching TV at this time.       Problem: Fall Risk  Goal: Patient will remain free from falls  Outcome: Progressing  Note: Fall precautions in place, call light within reach, Pt uses call light appropriately.     Problem: Peripheral Vascular Perfusion:  Goal: Adequacy of tissue perfusion will improve  Outcome: Progressing  Note: Encouraged Pt to perform foot pumps and ankle rolls periodically throughout the day and while she is awake at night. To keep the blood circulating from RLE/returning to her heart. L BKA.    Pt moved bowels this shift, soft, loose, brown, moderate amount. Buttock light red, small amount of excoriation, no pain while cleansing area.              Patient is not progressing towards the following goals:

## 2024-05-25 NOTE — PROGRESS NOTES
This Rn attempted to call report to [facility] twice. Sent to voicemail twice.

## 2024-05-25 NOTE — DISCHARGE PLANNING
Case Management Discharge Planning    COBRA packet completed and given to RN    Called pt's daughter 2x and left VM notifying her of transport time

## 2024-05-28 LAB
BACTERIA BLD CULT: NORMAL
BACTERIA BLD CULT: NORMAL
SIGNIFICANT IND 70042: NORMAL
SIGNIFICANT IND 70042: NORMAL
SITE SITE: NORMAL
SITE SITE: NORMAL
SOURCE SOURCE: NORMAL
SOURCE SOURCE: NORMAL

## 2024-06-03 ENCOUNTER — HOSPITAL ENCOUNTER (INPATIENT)
Facility: MEDICAL CENTER | Age: 64
LOS: 8 days | DRG: 640 | End: 2024-06-11
Attending: EMERGENCY MEDICINE | Admitting: HOSPITALIST
Payer: MEDICAID

## 2024-06-03 ENCOUNTER — APPOINTMENT (OUTPATIENT)
Dept: RADIOLOGY | Facility: MEDICAL CENTER | Age: 64
DRG: 640 | End: 2024-06-03
Payer: MEDICAID

## 2024-06-03 ENCOUNTER — APPOINTMENT (OUTPATIENT)
Dept: RADIOLOGY | Facility: MEDICAL CENTER | Age: 64
DRG: 640 | End: 2024-06-03
Attending: EMERGENCY MEDICINE
Payer: MEDICAID

## 2024-06-03 ENCOUNTER — APPOINTMENT (OUTPATIENT)
Dept: RADIOLOGY | Facility: MEDICAL CENTER | Age: 64
DRG: 640 | End: 2024-06-03
Attending: HOSPITALIST
Payer: MEDICAID

## 2024-06-03 DIAGNOSIS — E86.0 DEHYDRATION: ICD-10-CM

## 2024-06-03 DIAGNOSIS — R52 PAIN: ICD-10-CM

## 2024-06-03 DIAGNOSIS — R60.0 BILATERAL LEG EDEMA: ICD-10-CM

## 2024-06-03 DIAGNOSIS — G93.40 ACUTE ENCEPHALOPATHY: ICD-10-CM

## 2024-06-03 DIAGNOSIS — D50.9 IRON DEFICIENCY ANEMIA, UNSPECIFIED IRON DEFICIENCY ANEMIA TYPE: ICD-10-CM

## 2024-06-03 DIAGNOSIS — N17.9 AKI (ACUTE KIDNEY INJURY) (HCC): ICD-10-CM

## 2024-06-03 DIAGNOSIS — I48.19 PERSISTENT ATRIAL FIBRILLATION (HCC): ICD-10-CM

## 2024-06-03 DIAGNOSIS — E11.65 UNCONTROLLED TYPE 2 DIABETES MELLITUS WITH HYPERGLYCEMIA (HCC): ICD-10-CM

## 2024-06-03 DIAGNOSIS — I48.91 ATRIAL FIBRILLATION WITH RAPID VENTRICULAR RESPONSE (HCC): ICD-10-CM

## 2024-06-03 PROBLEM — E87.1 HYPONATREMIA: Status: ACTIVE | Noted: 2024-06-03

## 2024-06-03 PROBLEM — R19.7 DIARRHEA: Status: ACTIVE | Noted: 2024-06-03

## 2024-06-03 PROBLEM — E87.20 METABOLIC ACIDOSIS: Status: ACTIVE | Noted: 2024-06-03

## 2024-06-03 LAB
ALBUMIN SERPL BCP-MCNC: 2.6 G/DL (ref 3.2–4.9)
ALBUMIN/GLOB SERPL: 0.6 G/DL
ALP SERPL-CCNC: 131 U/L (ref 30–99)
ALT SERPL-CCNC: <5 U/L (ref 2–50)
AMMONIA PLAS-SCNC: 28 UMOL/L (ref 11–45)
ANION GAP SERPL CALC-SCNC: 11 MMOL/L (ref 7–16)
ANION GAP SERPL CALC-SCNC: 12 MMOL/L (ref 7–16)
ANION GAP SERPL CALC-SCNC: 13 MMOL/L (ref 7–16)
AST SERPL-CCNC: 13 U/L (ref 12–45)
BASOPHILS # BLD AUTO: 0.7 % (ref 0–1.8)
BASOPHILS # BLD: 0.06 K/UL (ref 0–0.12)
BILIRUB SERPL-MCNC: 0.9 MG/DL (ref 0.1–1.5)
BUN SERPL-MCNC: 45 MG/DL (ref 8–22)
BUN SERPL-MCNC: 47 MG/DL (ref 8–22)
BUN SERPL-MCNC: 47 MG/DL (ref 8–22)
CALCIUM ALBUM COR SERPL-MCNC: 8.9 MG/DL (ref 8.5–10.5)
CALCIUM SERPL-MCNC: 7.5 MG/DL (ref 8.5–10.5)
CALCIUM SERPL-MCNC: 7.6 MG/DL (ref 8.5–10.5)
CALCIUM SERPL-MCNC: 7.8 MG/DL (ref 8.5–10.5)
CHLORIDE SERPL-SCNC: 101 MMOL/L (ref 96–112)
CHLORIDE SERPL-SCNC: 102 MMOL/L (ref 96–112)
CHLORIDE SERPL-SCNC: 102 MMOL/L (ref 96–112)
CO2 SERPL-SCNC: 14 MMOL/L (ref 20–33)
CO2 SERPL-SCNC: 15 MMOL/L (ref 20–33)
CO2 SERPL-SCNC: 17 MMOL/L (ref 20–33)
CREAT SERPL-MCNC: 2.77 MG/DL (ref 0.5–1.4)
CREAT SERPL-MCNC: 2.81 MG/DL (ref 0.5–1.4)
CREAT SERPL-MCNC: 2.97 MG/DL (ref 0.5–1.4)
EKG IMPRESSION: NORMAL
EOSINOPHIL # BLD AUTO: 0.09 K/UL (ref 0–0.51)
EOSINOPHIL NFR BLD: 1 % (ref 0–6.9)
ERYTHROCYTE [DISTWIDTH] IN BLOOD BY AUTOMATED COUNT: 58.9 FL (ref 35.9–50)
GFR SERPLBLD CREATININE-BSD FMLA CKD-EPI: 17 ML/MIN/1.73 M 2
GFR SERPLBLD CREATININE-BSD FMLA CKD-EPI: 18 ML/MIN/1.73 M 2
GFR SERPLBLD CREATININE-BSD FMLA CKD-EPI: 19 ML/MIN/1.73 M 2
GLOBULIN SER CALC-MCNC: 4.6 G/DL (ref 1.9–3.5)
GLUCOSE BLD STRIP.AUTO-MCNC: 116 MG/DL (ref 65–99)
GLUCOSE BLD STRIP.AUTO-MCNC: 120 MG/DL (ref 65–99)
GLUCOSE SERPL-MCNC: 118 MG/DL (ref 65–99)
GLUCOSE SERPL-MCNC: 132 MG/DL (ref 65–99)
GLUCOSE SERPL-MCNC: 134 MG/DL (ref 65–99)
HCT VFR BLD AUTO: 27.4 % (ref 37–47)
HGB BLD-MCNC: 8.6 G/DL (ref 12–16)
IMM GRANULOCYTES # BLD AUTO: 0.03 K/UL (ref 0–0.11)
IMM GRANULOCYTES NFR BLD AUTO: 0.3 % (ref 0–0.9)
LACTATE SERPL-SCNC: 2 MMOL/L (ref 0.5–2)
LYMPHOCYTES # BLD AUTO: 0.86 K/UL (ref 1–4.8)
LYMPHOCYTES NFR BLD: 9.8 % (ref 22–41)
MCH RBC QN AUTO: 27.2 PG (ref 27–33)
MCHC RBC AUTO-ENTMCNC: 31.4 G/DL (ref 32.2–35.5)
MCV RBC AUTO: 86.7 FL (ref 81.4–97.8)
MONOCYTES # BLD AUTO: 0.7 K/UL (ref 0–0.85)
MONOCYTES NFR BLD AUTO: 8 % (ref 0–13.4)
NEUTROPHILS # BLD AUTO: 7.06 K/UL (ref 1.82–7.42)
NEUTROPHILS NFR BLD: 80.2 % (ref 44–72)
NRBC # BLD AUTO: 0 K/UL
NRBC BLD-RTO: 0 /100 WBC (ref 0–0.2)
PLATELET # BLD AUTO: 100 K/UL (ref 164–446)
PMV BLD AUTO: 9.4 FL (ref 9–12.9)
POTASSIUM SERPL-SCNC: 5 MMOL/L (ref 3.6–5.5)
POTASSIUM SERPL-SCNC: 5.1 MMOL/L (ref 3.6–5.5)
POTASSIUM SERPL-SCNC: 5.6 MMOL/L (ref 3.6–5.5)
PROT SERPL-MCNC: 7.2 G/DL (ref 6–8.2)
RBC # BLD AUTO: 3.16 M/UL (ref 4.2–5.4)
SODIUM SERPL-SCNC: 129 MMOL/L (ref 135–145)
WBC # BLD AUTO: 8.8 K/UL (ref 4.8–10.8)

## 2024-06-03 PROCEDURE — 70450 CT HEAD/BRAIN W/O DYE: CPT

## 2024-06-03 PROCEDURE — 36415 COLL VENOUS BLD VENIPUNCTURE: CPT

## 2024-06-03 PROCEDURE — 82140 ASSAY OF AMMONIA: CPT

## 2024-06-03 PROCEDURE — A9270 NON-COVERED ITEM OR SERVICE: HCPCS | Performed by: HOSPITALIST

## 2024-06-03 PROCEDURE — 770001 HCHG ROOM/CARE - MED/SURG/GYN PRIV*

## 2024-06-03 PROCEDURE — 99285 EMERGENCY DEPT VISIT HI MDM: CPT

## 2024-06-03 PROCEDURE — 82962 GLUCOSE BLOOD TEST: CPT

## 2024-06-03 PROCEDURE — 71045 X-RAY EXAM CHEST 1 VIEW: CPT

## 2024-06-03 PROCEDURE — 99497 ADVNCD CARE PLAN 30 MIN: CPT | Performed by: HOSPITALIST

## 2024-06-03 PROCEDURE — 700111 HCHG RX REV CODE 636 W/ 250 OVERRIDE (IP): Mod: JZ,UD | Performed by: EMERGENCY MEDICINE

## 2024-06-03 PROCEDURE — 99223 1ST HOSP IP/OBS HIGH 75: CPT | Mod: 25 | Performed by: HOSPITALIST

## 2024-06-03 PROCEDURE — 96374 THER/PROPH/DIAG INJ IV PUSH: CPT

## 2024-06-03 PROCEDURE — 700105 HCHG RX REV CODE 258: Mod: UD | Performed by: EMERGENCY MEDICINE

## 2024-06-03 PROCEDURE — 80048 BASIC METABOLIC PNL TOTAL CA: CPT

## 2024-06-03 PROCEDURE — 93005 ELECTROCARDIOGRAM TRACING: CPT | Performed by: EMERGENCY MEDICINE

## 2024-06-03 PROCEDURE — 80053 COMPREHEN METABOLIC PANEL: CPT

## 2024-06-03 PROCEDURE — 85025 COMPLETE CBC W/AUTO DIFF WBC: CPT

## 2024-06-03 PROCEDURE — 83605 ASSAY OF LACTIC ACID: CPT

## 2024-06-03 PROCEDURE — 700102 HCHG RX REV CODE 250 W/ 637 OVERRIDE(OP): Performed by: HOSPITALIST

## 2024-06-03 PROCEDURE — 700105 HCHG RX REV CODE 258: Performed by: HOSPITALIST

## 2024-06-03 PROCEDURE — 87040 BLOOD CULTURE FOR BACTERIA: CPT

## 2024-06-03 RX ORDER — PROMETHAZINE HYDROCHLORIDE 25 MG/1
12.5-25 TABLET ORAL EVERY 4 HOURS PRN
Status: DISCONTINUED | OUTPATIENT
Start: 2024-06-03 | End: 2024-06-11 | Stop reason: HOSPADM

## 2024-06-03 RX ORDER — ACETAMINOPHEN 325 MG/1
650 TABLET ORAL EVERY 6 HOURS PRN
Status: DISCONTINUED | OUTPATIENT
Start: 2024-06-03 | End: 2024-06-11 | Stop reason: HOSPADM

## 2024-06-03 RX ORDER — ONDANSETRON 2 MG/ML
4 INJECTION INTRAMUSCULAR; INTRAVENOUS ONCE
Status: COMPLETED | OUTPATIENT
Start: 2024-06-03 | End: 2024-06-03

## 2024-06-03 RX ORDER — GABAPENTIN 400 MG/1
400 CAPSULE ORAL 2 TIMES DAILY
Status: DISCONTINUED | OUTPATIENT
Start: 2024-06-03 | End: 2024-06-11 | Stop reason: HOSPADM

## 2024-06-03 RX ORDER — DEXTROSE MONOHYDRATE 25 G/50ML
25 INJECTION, SOLUTION INTRAVENOUS
Status: DISCONTINUED | OUTPATIENT
Start: 2024-06-03 | End: 2024-06-05

## 2024-06-03 RX ORDER — NYSTATIN 100000 [USP'U]/G
1 POWDER TOPICAL 3 TIMES DAILY
COMMUNITY
Start: 2024-05-28

## 2024-06-03 RX ORDER — HYDROMORPHONE HYDROCHLORIDE 1 MG/ML
0.25 INJECTION, SOLUTION INTRAMUSCULAR; INTRAVENOUS; SUBCUTANEOUS
Status: DISCONTINUED | OUTPATIENT
Start: 2024-06-03 | End: 2024-06-11 | Stop reason: HOSPADM

## 2024-06-03 RX ORDER — SPIRONOLACTONE 25 MG/1
25 TABLET ORAL DAILY
Status: DISCONTINUED | OUTPATIENT
Start: 2024-06-04 | End: 2024-06-09

## 2024-06-03 RX ORDER — OXYCODONE HYDROCHLORIDE 5 MG/1
5 TABLET ORAL EVERY 8 HOURS PRN
Status: DISCONTINUED | OUTPATIENT
Start: 2024-06-03 | End: 2024-06-03

## 2024-06-03 RX ORDER — SODIUM CHLORIDE 9 MG/ML
INJECTION, SOLUTION INTRAVENOUS CONTINUOUS
Status: DISCONTINUED | OUTPATIENT
Start: 2024-06-03 | End: 2024-06-03

## 2024-06-03 RX ORDER — CLOTRIMAZOLE AND BETAMETHASONE DIPROPIONATE 10; .64 MG/G; MG/G
1 CREAM TOPICAL 2 TIMES DAILY
COMMUNITY
Start: 2024-05-28

## 2024-06-03 RX ORDER — PROMETHAZINE HYDROCHLORIDE 25 MG/1
12.5-25 SUPPOSITORY RECTAL EVERY 4 HOURS PRN
Status: DISCONTINUED | OUTPATIENT
Start: 2024-06-03 | End: 2024-06-11 | Stop reason: HOSPADM

## 2024-06-03 RX ORDER — SODIUM CHLORIDE 9 MG/ML
1000 INJECTION, SOLUTION INTRAVENOUS ONCE
Status: COMPLETED | OUTPATIENT
Start: 2024-06-03 | End: 2024-06-03

## 2024-06-03 RX ORDER — OXYCODONE HYDROCHLORIDE 5 MG/1
5 TABLET ORAL
Status: DISCONTINUED | OUTPATIENT
Start: 2024-06-03 | End: 2024-06-11 | Stop reason: HOSPADM

## 2024-06-03 RX ORDER — OXYCODONE HYDROCHLORIDE 5 MG/1
2.5 TABLET ORAL
Status: DISCONTINUED | OUTPATIENT
Start: 2024-06-03 | End: 2024-06-11 | Stop reason: HOSPADM

## 2024-06-03 RX ORDER — NITROFURANTOIN 25; 75 MG/1; MG/1
100 CAPSULE ORAL 2 TIMES DAILY
Status: ON HOLD | COMMUNITY
Start: 2024-05-25 | End: 2024-06-11

## 2024-06-03 RX ORDER — OMEPRAZOLE 20 MG/1
20 CAPSULE, DELAYED RELEASE ORAL EVERY MORNING
Status: DISCONTINUED | OUTPATIENT
Start: 2024-06-04 | End: 2024-06-11 | Stop reason: HOSPADM

## 2024-06-03 RX ORDER — ONDANSETRON 2 MG/ML
4 INJECTION INTRAMUSCULAR; INTRAVENOUS EVERY 4 HOURS PRN
Status: DISCONTINUED | OUTPATIENT
Start: 2024-06-03 | End: 2024-06-11 | Stop reason: HOSPADM

## 2024-06-03 RX ORDER — CLOTRIMAZOLE AND BETAMETHASONE DIPROPIONATE 10; .64 MG/G; MG/G
1 CREAM TOPICAL 2 TIMES DAILY
Status: DISPENSED | OUTPATIENT
Start: 2024-06-03 | End: 2024-06-08

## 2024-06-03 RX ORDER — ALBUTEROL SULFATE 90 UG/1
2 AEROSOL, METERED RESPIRATORY (INHALATION) EVERY 4 HOURS PRN
Status: DISCONTINUED | OUTPATIENT
Start: 2024-06-03 | End: 2024-06-11 | Stop reason: HOSPADM

## 2024-06-03 RX ORDER — ONDANSETRON 4 MG/1
4 TABLET, ORALLY DISINTEGRATING ORAL EVERY 4 HOURS PRN
Status: DISCONTINUED | OUTPATIENT
Start: 2024-06-03 | End: 2024-06-11 | Stop reason: HOSPADM

## 2024-06-03 RX ORDER — GABAPENTIN 400 MG/1
400 CAPSULE ORAL 2 TIMES DAILY
COMMUNITY

## 2024-06-03 RX ORDER — ATORVASTATIN CALCIUM 20 MG/1
20 TABLET, FILM COATED ORAL NIGHTLY
Status: DISCONTINUED | OUTPATIENT
Start: 2024-06-03 | End: 2024-06-11 | Stop reason: HOSPADM

## 2024-06-03 RX ORDER — LOSARTAN POTASSIUM 100 MG/1
100 TABLET ORAL EVERY MORNING
COMMUNITY

## 2024-06-03 RX ORDER — PANTOPRAZOLE SODIUM 20 MG/1
20 TABLET, DELAYED RELEASE ORAL EVERY MORNING
COMMUNITY

## 2024-06-03 RX ORDER — OXYCODONE HYDROCHLORIDE 5 MG/1
5 TABLET ORAL EVERY 8 HOURS PRN
Status: ON HOLD | COMMUNITY
End: 2024-06-11

## 2024-06-03 RX ORDER — ALBUTEROL SULFATE 90 UG/1
2 AEROSOL, METERED RESPIRATORY (INHALATION) EVERY 4 HOURS
Status: DISCONTINUED | OUTPATIENT
Start: 2024-06-03 | End: 2024-06-03

## 2024-06-03 RX ORDER — PROCHLORPERAZINE EDISYLATE 5 MG/ML
5-10 INJECTION INTRAMUSCULAR; INTRAVENOUS EVERY 4 HOURS PRN
Status: DISCONTINUED | OUTPATIENT
Start: 2024-06-03 | End: 2024-06-11 | Stop reason: HOSPADM

## 2024-06-03 RX ADMIN — ONDANSETRON 4 MG: 2 INJECTION INTRAMUSCULAR; INTRAVENOUS at 13:16

## 2024-06-03 RX ADMIN — ATORVASTATIN CALCIUM 20 MG: 20 TABLET, FILM COATED ORAL at 20:12

## 2024-06-03 RX ADMIN — ALBUTEROL SULFATE 2 PUFF: 90 AEROSOL, METERED RESPIRATORY (INHALATION) at 20:22

## 2024-06-03 RX ADMIN — CLOTRIMAZOLE AND BETAMETHASONE DIPROPIONATE 1 APPLICATION: 10; .5 CREAM TOPICAL at 18:15

## 2024-06-03 RX ADMIN — APIXABAN 5 MG: 5 TABLET, FILM COATED ORAL at 18:15

## 2024-06-03 RX ADMIN — ALBUTEROL SULFATE 2 PUFF: 90 AEROSOL, METERED RESPIRATORY (INHALATION) at 22:13

## 2024-06-03 RX ADMIN — SODIUM CHLORIDE 1000 ML: 9 INJECTION, SOLUTION INTRAVENOUS at 13:16

## 2024-06-03 RX ADMIN — GABAPENTIN 400 MG: 400 CAPSULE ORAL at 18:15

## 2024-06-03 RX ADMIN — SODIUM CHLORIDE: 9 INJECTION, SOLUTION INTRAVENOUS at 17:54

## 2024-06-03 ASSESSMENT — CHA2DS2 SCORE
AGE 65 TO 74: NO
CHF OR LEFT VENTRICULAR DYSFUNCTION: NO
HYPERTENSION: YES
AGE 75 OR GREATER: NO
CHA2DS2 VASC SCORE: 3
VASCULAR DISEASE: NO
SEX: FEMALE
PRIOR STROKE OR TIA OR THROMBOEMBOLISM: NO
DIABETES: YES

## 2024-06-03 ASSESSMENT — SOCIAL DETERMINANTS OF HEALTH (SDOH)
WITHIN THE LAST YEAR, HAVE TO BEEN RAPED OR FORCED TO HAVE ANY KIND OF SEXUAL ACTIVITY BY YOUR PARTNER OR EX-PARTNER?: NO
WITHIN THE LAST YEAR, HAVE YOU BEEN HUMILIATED OR EMOTIONALLY ABUSED IN OTHER WAYS BY YOUR PARTNER OR EX-PARTNER?: NO
WITHIN THE LAST YEAR, HAVE YOU BEEN KICKED, HIT, SLAPPED, OR OTHERWISE PHYSICALLY HURT BY YOUR PARTNER OR EX-PARTNER?: NO
WITHIN THE LAST YEAR, HAVE YOU BEEN AFRAID OF YOUR PARTNER OR EX-PARTNER?: NO

## 2024-06-03 ASSESSMENT — FIBROSIS 4 INDEX
FIB4 SCORE: 2.21
FIB4 SCORE: 3.92

## 2024-06-03 ASSESSMENT — ENCOUNTER SYMPTOMS
NERVOUS/ANXIOUS: 0
FOCAL WEAKNESS: 0
SHORTNESS OF BREATH: 0
NAUSEA: 1
FLANK PAIN: 0
CHILLS: 0
BRUISES/BLEEDS EASILY: 0
COUGH: 0
DIARRHEA: 1
EYE DISCHARGE: 0
MYALGIAS: 0
EYE REDNESS: 0
STRIDOR: 0
FEVER: 0

## 2024-06-03 ASSESSMENT — PAIN DESCRIPTION - PAIN TYPE: TYPE: ACUTE PAIN

## 2024-06-03 NOTE — ED NOTES
Medication history reviewed with MAR provided by [facility] [phone]. Med rec is complete  Allergies reviewed.     Patient was prescribed Macrobid 100mg bid on 5/25/24- last dose 6/3/24 am per MAR  Anticoagulants: Eliquis 5mg- last dose 6/3/24 am.    Yaya Marino PhT

## 2024-06-03 NOTE — ED TRIAGE NOTES
Pt bib ems from [place] [facility].  Chief Complaint   Patient presents with    Irregular Heart Beat     Pt jaquan Pace from [place] [facility] for tachycardia     Pt admitted there after PNA diagnosis.   Pt arrives tachycardic on 2lnc.  Pt in a gown, connected to monitor. IV start successful. Pt up to be seen by ERP.

## 2024-06-03 NOTE — ASSESSMENT & PLAN NOTE
Likely due to reduced intravascular volume and increase bicarb losses through diarrhea and acute kidney injury  Initially given IV fluids and then these were discontinued after developing volume overload    6/8 I discussed with nephro, started on bicarb p.o.    monitor

## 2024-06-03 NOTE — ED PROVIDER NOTES
ER Provider Note    Scribed for Nima Jesus M.D. by Janessa Arias. 6/3/2024   12:31 PM    Primary Care Provider: Anum Gatica M.D.    CHIEF COMPLAINT  Chief Complaint   Patient presents with    Irregular Heart Beat     Pt jaquan Pace from [facility] for tachycardia     EXTERNAL RECORDS REVIEWED  The patient was last here and admitted for Sepsis on 5/16/24.    HPI/ROS  LIMITATION TO HISTORY   Select: Altered mental status / Confusion  OUTSIDE HISTORIAN(S):  Family present at bedside helped to provide the HPI as noted below.    April Alicia is a 64 y.o. female who presents to the ED for evaluation of tachycardia and altered mental status noticed 1 day ago. Patient states she is unable to swallow, and feels thirsty. Patient states she had a headache earlier today. Patient states she had nausea, vomiting, and diarrhea 2 days ago. Patient states she had a persistent pain in her abdomen which has since resolved. Patient's daughter states the patient has exhibited this level of confusion 2 times prior, both of which happened with previous abdominal infections. Daughter states that at recent healthcare visit, the patient was previously instructed by a [facility] to drink less fluids and the patient may have taken this advice 'too literally'.     PAST MEDICAL HISTORY  Past Medical History:   Diagnosis Date    Abnormal finding of lung 12/27/2022    Acute exacerbation of chronic obstructive pulmonary disease (COPD) (HCC) 01/27/2020    Alcoholic cirrhosis (HCC)     Arthritis     OA in knees    ASTHMA     Atrial fibrillation (HCC)     Dry eyes 11/16/2017    Edentulous     upper and lower dentures    Emphysema of lung (HCC)     H/O: hysterectomy 12/05/2019    Heart burn     left knee    Hepatic encephalopathy (HCC) 12/27/2022    Hepatitis C infection 07/28/2022    History of rheumatic fever 09/04/2017    Hypertension     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HCC)     Recurrent, L knee, x4 with surgical washout     Medication overuse headache 08/15/2017    Mitral regurgitation     Pancytopenia ([facility]) 07/26/2022    Pneumonia 02/2020    Primary osteoarthritis of left knee 08/25/2020    Prosthetic joint infection ([facility]) 2018    Right knee after total knee    Protein-calorie malnutrition, severe ([facility]) 08/01/2022    Right leg DVT ([facility]) 2018    acute, resolved    Seizure disorder ([facility])     Septic arthritis of knee, left ([facility]) 07/19/2022    Septic arthritis of shoulder, left ([facility]) 07/17/2022    Septic shock due to Pseudomonas species ([facility]) 10/30/2023    Type 2 diabetes mellitus ([facility]) 12/27/2022       SURGICAL HISTORY  Past Surgical History:   Procedure Laterality Date    KNEE AMPUTATION ABOVE Left 1/16/2024    Procedure: LEFT ABOVE KNEE AMPUTATION;  Surgeon: Obdulio Gray M.D.;  Location: [facility];  Service: Orthopedics    PB TOTAL KNEE ARTHROPLASTY Left 10/30/2023    Procedure: LEFT TOTAL KNEE ARTHROPLASTY EXPLANTATION, INSERTION OF ANTIBIOTIC SPACER, AND APPLICATION OF INCISIONAL WOUND VACUUM;  Surgeon: Wild Luz M.D.;  Location: [facility];  Service: Orthopedics    PB REVISE KNEE JOINT REPLACE,ALL PARTS Left 12/28/2022    Procedure: REVISION, TOTAL ARTHROPLASTY, KNEE, ALL COMPONENTS-LEFT;  Surgeon: Wild Luz M.D.;  Location: [facility];  Service: Orthopedics    IRRIGATION & DEBRIDEMENT GENERAL Left 12/28/2022    Procedure: IRRIGATION AND DEBRIDEMENT, WOUND;  Surgeon: Wild Luz M.D.;  Location: [facility];  Service: Orthopedics    PB REVISE KNEE JOINT REPLACE,ALL PARTS Left 7/19/2022    Procedure: REVISION, TOTAL ARTHROPLASTY, KNEE, ALL COMPONENTS - ANTIBIOTIC SPACER;  Surgeon: Wild uLz M.D.;  Location: [facility];  Service: Orthopedics    IRRIGATION & DEBRIDEMENT GENERAL Left 7/17/2022    Procedure: IRRIGATION AND DEBRIDEMENT, SHOULDER;  Surgeon: Obdulio Gray M.D.;  Location: [facility];  Service: Orthopedics    PB TOTAL KNEE ARTHROPLASTY  Left 8/24/2020    Procedure: ARTHROPLASTY, KNEE, TOTAL;  Surgeon: Víctor Faustin M.D.;  Location: SURGERY [facility];  Service: Orthopedics    KNEE ARTHROPLASTY TOTAL Right 2018    IRRIGATION & DEBRIDEMENT ORTHO Right 9/3/2017    Procedure: IRRIGATION & DEBRIDEMENT ORTHO, POLY EXCHANGE;  Surgeon: Jerome Russell M.D.;  Location: SURGERY [facility];  Service: Orthopedics    COLONOSCOPY WITH CLIPPING  10/28/2015    Procedure: COLONOSCOPY WITH CLIPPING;  Surgeon: Ruben Colon M.D.;  Location: ENDOSCOPY [facility];  Service:     COLONOSCOPY WITH SCLEROTHERAPY  10/28/2015    Procedure: COLONOSCOPY WITH SCLEROTHERAPY;  Surgeon: Ruben Colon M.D.;  Location: ENDOSCOPY [facility];  Service:     COLONOSCOPY WITH TATTOOING  10/28/2015    Procedure: COLONOSCOPY WITH TATTOOING;  Surgeon: Ruben Colon M.D.;  Location: ENDOSCOPY [facility];  Service:     GYN SURGERY  2003    hysteroscoopy    GYN SURGERY  1982    tubal ligation       FAMILY HISTORY  Family History   Problem Relation Age of Onset    Diabetes Mother     Seizures Father     Heart Attack Father 45    Diabetes Brother     Alcohol abuse Brother     Dementia Brother     Diabetes Brother        SOCIAL HISTORY   reports that she quit smoking about 7 years ago. Her smoking use included cigarettes. She quit smokeless tobacco use about 3 years ago. She reports that she does not currently use alcohol. She reports that she does not currently use drugs.    CURRENT MEDICATIONS  Previous Medications    ACETAMINOPHEN (TYLENOL) 325 MG TAB    Take 2 Tablets by mouth every 8 hours.    ALBUTEROL 108 (90 BASE) MCG/ACT AERO SOLN INHALATION AEROSOL    Inhale 2 Puffs every 4 hours. Every 4-6 hours prn    APIXABAN (ELIQUIS) 5MG TAB    Take 1 Tablet by mouth 2 times a day. Indications: Thromboembolism secondary to Atrial Fibrillation    ATORVASTATIN (LIPITOR) 20 MG TAB    Take 1 Tablet by mouth every evening.    FERROUS SULFATE 325 (65 FE) MG  "TABLET    Take 1 Tablet by mouth every day.    FUROSEMIDE (LASIX) 40 MG TAB    Take 1 Tablet by mouth every day for 30 days.    GUAIFENESIN DEXTROMETHORPHAN (ROBITUSSIN DM) 100-10 MG/5ML SYRUP SYRUP    Take 10 mL by mouth every 6 hours as needed for Cough.    LACTULOSE 20 GM/30ML SOLUTION    Take 30 mL by mouth 3 times a day. Hold dose if 4 or more soft or loose stools in the last 24 hours, please gradually increase amount per dose by 5mls if you do not have at least 2 stools per day.    OMEPRAZOLE (PRILOSEC) 40 MG DELAYED-RELEASE CAPSULE    Take 1 Capsule by mouth every day.    ONDANSETRON (ZOFRAN ODT) 4 MG TABLET DISPERSIBLE    Take 1 Tablet by mouth every four hours as needed for Nausea/Vomiting (give PO if no IV route available).    SODIUM BICARBONATE (SODIUM BICARBONATE) 650 MG TAB    Take 1 Tablet by mouth 2 times a day for 30 days.    SPIRONOLACTONE (ALDACTONE) 25 MG TAB    Take 1 Tablet by mouth every day for 30 days.    VITAMIN D (CHOLECALCIFEROL) 1000 UNIT TAB    Take 1 Tablet by mouth every day for 30 days.       ALLERGIES  No Known Allergies     PHYSICAL EXAM  /72   Pulse (!) 107   Temp 37.5 °C (99.5 °F) (Temporal)   Resp 20   Ht 1.702 m (5' 7\")   Wt 80.7 kg (178 lb)   LMP 06/02/2008   SpO2 95%   BMI 27.88 kg/m²      Nursing note and vitals reviewed.  Constitutional: Well-developed and well-nourished. No distress. Patient is obese, hard of hearing,.  HENT: Head is normocephalic and atraumatic. Oropharynx is clear, dry mucous membranes without exudate or erythema.   Eyes: Pupils are equal, round, and reactive to light. Conjunctiva are normal.   Cardiovascular: Tachycardic with irregularly irregular rhythm. No murmur heard. Normal radial pulses.  Pulmonary/Chest: Breath sounds normal. No wheezes or rales.   Abdominal: Soft and non-tender. No distention    Musculoskeletal: Extremities exhibit normal range of motion without edema or tenderness.   Neurological: Awake, alert and oriented to " person, place, and time. No focal deficits noted.  Skin: Skin is warm and dry. No rash.   Psychiatric: Normal mood and affect. Appropriate for clinical situation    DIAGNOSTIC STUDIES    Labs:   Results for orders placed or performed during the hospital encounter of 06/03/24   Lactic Acid   Result Value Ref Range    Lactic Acid 2.0 0.5 - 2.0 mmol/L   CBC with Differential   Result Value Ref Range    WBC 8.8 4.8 - 10.8 K/uL    RBC 3.16 (L) 4.20 - 5.40 M/uL    Hemoglobin 8.6 (L) 12.0 - 16.0 g/dL    Hematocrit 27.4 (L) 37.0 - 47.0 %    MCV 86.7 81.4 - 97.8 fL    MCH 27.2 27.0 - 33.0 pg    MCHC 31.4 (L) 32.2 - 35.5 g/dL    RDW 58.9 (H) 35.9 - 50.0 fL    Platelet Count 100 (L) 164 - 446 K/uL    MPV 9.4 9.0 - 12.9 fL    Neutrophils-Polys 80.20 (H) 44.00 - 72.00 %    Lymphocytes 9.80 (L) 22.00 - 41.00 %    Monocytes 8.00 0.00 - 13.40 %    Eosinophils 1.00 0.00 - 6.90 %    Basophils 0.70 0.00 - 1.80 %    Immature Granulocytes 0.30 0.00 - 0.90 %    Nucleated RBC 0.00 0.00 - 0.20 /100 WBC    Neutrophils (Absolute) 7.06 1.82 - 7.42 K/uL    Lymphs (Absolute) 0.86 (L) 1.00 - 4.80 K/uL    Monos (Absolute) 0.70 0.00 - 0.85 K/uL    Eos (Absolute) 0.09 0.00 - 0.51 K/uL    Baso (Absolute) 0.06 0.00 - 0.12 K/uL    Immature Granulocytes (abs) 0.03 0.00 - 0.11 K/uL    NRBC (Absolute) 0.00 K/uL   Complete Metabolic Panel   Result Value Ref Range    Sodium 129 (L) 135 - 145 mmol/L    Potassium 5.0 3.6 - 5.5 mmol/L    Chloride 101 96 - 112 mmol/L    Co2 17 (L) 20 - 33 mmol/L    Anion Gap 11.0 7.0 - 16.0    Glucose 132 (H) 65 - 99 mg/dL    Bun 45 (H) 8 - 22 mg/dL    Creatinine 2.97 (H) 0.50 - 1.40 mg/dL    Calcium 7.8 (L) 8.5 - 10.5 mg/dL    Correct Calcium 8.9 8.5 - 10.5 mg/dL    AST(SGOT) 13 12 - 45 U/L    Alkaline Phosphatase 131 (H) 30 - 99 U/L    Total Bilirubin 0.9 0.1 - 1.5 mg/dL    Albumin 2.6 (L) 3.2 - 4.9 g/dL    Total Protein 7.2 6.0 - 8.2 g/dL    Globulin 4.6 (H) 1.9 - 3.5 g/dL    A-G Ratio 0.6 g/dL   ESTIMATED GFR   Result  Value Ref Range    GFR (CKD-EPI) 17 (A) >60 mL/min/1.73 m 2   EKG (NOW)   Result Value Ref Range    Report       [facility]    Test Date:  2024  Pt Name:    FRANCISCA TURNER          Department: ER  MRN:        [id number]                      Room:       RD 03  Gender:     Female                       Technician: [id number]  :        1960                   Requested By:ER TRIAGE PROTOCOL  Order #:    [id number]                    Reading MD:    Measurements  Intervals                                Axis  Rate:       112                          P:          0  DC:         0                            QRS:        3  QRSD:       79                           T:          0  QT:         422  QTc:        576    Interpretive Statements  Atrial fibrillation  Low voltage, extremity leads  Prolonged QT interval  Compared to ECG 2024 18:50:37  Low QRS voltage now present  T-wave abnormality no longer present         EKG:   I have independently interpreted this EKG as detailed above.       Radiology:   This attending emergency physician has independently interpreted the diagnostic imaging associated with this visit and is awaiting the final reading from the radiologist.   Preliminary interpretation is a follows: Chest x-ray shows no focal pneumonia.    Radiologist interpretation:   DX-CHEST-PORTABLE (1 VIEW)   Final Result      Cardiomegaly with interstitial edema.           INITIAL ASSESSMENT AND PLAN    12:31 PM - Patient was evaluated at bedside. Ordered for DX Chest, Blood culture, Urine culture, Urinalysis, CMP, CBC with diff, Lactic acid, and EKG to evaluate. I discussed with patient and her family the possibility of sepsis but the need for more diagnostic testing.I discussed IV hydration to alleviate her symptoms. Patient verbalizes understanding and support with my plan of care.  Differential diagnoses include but not limited to: dehydration, electrolyte abnormality,  ELIZABETH, arrhythmia.     COURSE AND MEDICAL DECISION MAKING    12:49 PM - Labs show normal white blood cell count, hemoglobin is slightly low at 8.6. Appears to be clinically dehydrated likely related to her recent nausea, vomiting, and diarrhea, will initiate fluid resuscitation.    1:30 PM laboratory studies are remarkable for acute kidney injury.  Elevated creatinine compared to baseline.  Patient is receiving IV fluids.  I feel that her tachycardia is related to her atrial fibrillation.  She is on metoprolol per the medication requisition form from her facility.  She is likely having some rapid ventricular response due to her dehydration.    HYDRATION: Based on the patient's presentation of Dehydration the patient was given IV fluids. IV Hydration was used because oral hydration was not adequate alone. Upon recheck following hydration, the patient was improved.       DISPOSITION AND DISCUSSIONS    I have discussed management of the patient with the following physicians and CANDIDO's:  Dr. Garcia, Hospitalist, will admit.    Patient presents today with dehydration, acute kidney injury, atrial fibrillation with rapid ventricular response.  Currently chronically anticoagulated.  Has chronic iron deficiency anemia.  She will be admitted to the hospital for further evaluation and treatment.    FINAL DIAGNOSIS  1. Atrial fibrillation with rapid ventricular response (HCC)    2. Dehydration    3. ELIZABETH (acute kidney injury) (HCC)    4. Iron deficiency anemia, unspecified iron deficiency anemia type         I, Janessa Arias (Scribe), am scribing for, and in the presence of, Nima Jesus M.D..    Electronically signed by: Janessa Arias (Lisandro), 6/3/2024    INima M.D. personally performed the services described in this documentation, as scribed by Janessa Arias in my presence, and it is both accurate and complete.      The note accurately reflects work and decisions made by me.  Nima Jesus M.D.  6/3/2024  1:31 PM

## 2024-06-03 NOTE — ASSESSMENT & PLAN NOTE
**CT scan of the head did not show evidence for acute infarction or hemorrhage  Per daughter, patient is lucid and cogent at baseline  Daughter feels patient becomes confused when she has a UTI, collected urine which is pending.  UA +, started on rocephin   Now more confused  Ammonia ava due to holding lactulose  Resuming rifaximin and lactulose

## 2024-06-03 NOTE — ASSESSMENT & PLAN NOTE
Mostly due to dehydration secondary to diarrhea and over medication with diuretics    6/9 cre flat 3>3>2.84  Continue IV Lasix, increased to 60 mg daily  Nephrology following  Not a candidate for dialysis  Persistent hyperkalemia due to ELIZABETH  Avoid nephrotoxic agent  Renal dose medications

## 2024-06-03 NOTE — PROGRESS NOTES
1530- Received report from ER RN.    1600- Pt arrived to floor. Pt is lethargic, easily aroused. She is able to answer simple questions.    Pt VSS obtained. Skin assessment completed. Admit profile not completed.    1622- Called ED RN to f/u and requested that labs/medications listed prior to arrival to S6.    4 Eyes Skin Assessment Completed by Tamara Colorado, RN and Little Moreno, RN.    Head WDL  Ears WDL  Nose WDL  Mouth WDL missing teeth  Neck WDL  Breast/Chest WDL  Shoulder Blades WDL  Spine WDL  (R) Arm/Elbow/Hand Bruising  (L) Arm/Elbow/Hand Bruising  Abdomen Peeling skin from previous rash per pt.  Groin Peeling skin from previous rash per pt.  Scrotum/Coccyx/Buttocks WDL; redness but blanching  (R) Leg/thigh WDL  (L) Leg AKA  (R) Heel/Foot/Toe lower leg appears to skin graft appearance.  Devices In Places Nasal Cannula  Interventions In Place Gray Ear Foams  Possible Skin Injury No  Pictures Uploaded Into Epic Yes  Wound Consult Placed N/A  RN Wound Prevention Protocol Ordered No

## 2024-06-03 NOTE — ASSESSMENT & PLAN NOTE
Differentials include overmedication with lactulose, viral infection, C. difficile colitis  [has been on antibiotics for UTI]  C. difficile initially ordered and pending but unlikely to be this given that patient has not had a bowel movement for a day, so discontinued  Supportive care with intravenous fluids monitor electrolytes and replace accordingly

## 2024-06-03 NOTE — H&P
Hospital Medicine History & Physical Note    Date of Service  6/3/2024    Primary Care Physician  Anum Gatica M.D.    Consultants  None      Code Status  Full Code    Chief Complaint  Chief Complaint   Patient presents with    Irregular Heart Beat     Pt jaquan Pace from [facility] skilled for tachycardia     History of Presenting Illness  April Alicia is a 64 y.o. female with a past medical history of elevated BMI of 26, diabetes, atrial fibrillation on anticoagulation, recent diagnosis of urinary tract infection who presented 6/3/2024 with generalized weakness diarrhea and palpitations.  Patient has not been feeling well over the past 1 day.  She has been progressively worsening confusion generalized weakness after a few episodes of diarrhea.  She was found to have atrial fibrillation with rapid ventricular response.     I discussed the plan of care with emergency physician, the patient and patient daughter present at bedside in the emergency room.    Review of Systems  Review of Systems   Constitutional:  Positive for malaise/fatigue. Negative for chills and fever.   Eyes:  Negative for discharge and redness.   Respiratory:  Negative for cough, shortness of breath and stridor.    Cardiovascular:  Negative for chest pain and leg swelling.   Gastrointestinal:  Positive for diarrhea and nausea.   Genitourinary:  Negative for flank pain.   Musculoskeletal:  Negative for myalgias.   Skin: Negative.    Neurological:  Negative for focal weakness.        Excessive sleepiness   Endo/Heme/Allergies:  Does not bruise/bleed easily.   Psychiatric/Behavioral:  The patient is not nervous/anxious.      Past Medical History   has a past medical history of Abnormal finding of lung (12/27/2022), Acute exacerbation of chronic obstructive pulmonary disease (COPD) ([facility]) (01/27/2020), Alcoholic cirrhosis ([facility]), Arthritis, ASTHMA, Atrial fibrillation ([facility]), Dry eyes (11/16/2017), Edentulous, Emphysema of lung ([facility]), H/O:  hysterectomy (12/05/2019), Heart burn, Hepatic encephalopathy (HCC) (12/27/2022), Hepatitis C infection (07/28/2022), History of rheumatic fever (09/04/2017), Hypertension, 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HCC), Medication overuse headache (08/15/2017), Mitral regurgitation, Pancytopenia (HCC) (07/26/2022), Pneumonia (02/2020), Primary osteoarthritis of left knee (08/25/2020), Prosthetic joint infection (HCC) (2018), Protein-calorie malnutrition, severe ([facility]) (08/01/2022), Right leg DVT ([facility]) (2018), Seizure disorder ([facility]), Septic arthritis of knee, left ([facility]) (07/19/2022), Septic arthritis of shoulder, left ([facility]) (07/17/2022), Septic shock due to Pseudomonas species ([facility]) (10/30/2023), and Type 2 diabetes mellitus ([facility]) (12/27/2022).    Surgical History   has a past surgical history that includes gyn surgery (1982); gyn surgery (2003); colonoscopy with clipping (10/28/2015); colonoscopy with sclerotherapy (10/28/2015); colonoscopy with tattooing (10/28/2015); irrigation & debridement ortho (Right, 9/3/2017); knee arthroplasty total (Right, 2018); pr total knee arthroplasty (Left, 8/24/2020); irrigation & debridement general (Left, 7/17/2022); pr revise knee joint replace,all parts (Left, 7/19/2022); pr revise knee joint replace,all parts (Left, 12/28/2022); irrigation & debridement general (Left, 12/28/2022); pr total knee arthroplasty (Left, 10/30/2023); and knee amputation above (Left, 1/16/2024).     Family History  family history includes Alcohol abuse in her brother; Dementia in her brother; Diabetes in her brother, brother, and mother; Heart Attack (age of onset: 45) in her father; Seizures in her father.      Social History   reports that she quit smoking about 7 years ago. Her smoking use included cigarettes. She quit smokeless tobacco use about 3 years ago. She reports that she does not currently use alcohol. She reports that she does not currently use drugs.    Allergies  Allergies   Allergen Reactions     Tuberculin Tests Unspecified     Per MAR from [facility]       Medications  Prior to Admission Medications   Prescriptions Last Dose Informant Patient Reported? Taking?   acetaminophen (TYLENOL) 325 MG Tab   No No   Sig: Take 2 Tablets by mouth every 8 hours.   albuterol 108 (90 Base) MCG/ACT Aero Soln inhalation aerosol  Family Member Yes No   Sig: Inhale 2 Puffs every 4 hours. Every 4-6 hours prn   apixaban (ELIQUIS) 5mg Tab  Family Member No No   Sig: Take 1 Tablet by mouth 2 times a day. Indications: Thromboembolism secondary to Atrial Fibrillation   atorvastatin (LIPITOR) 20 MG Tab  Family Member No No   Sig: Take 1 Tablet by mouth every evening.   ferrous sulfate 325 (65 Fe) MG tablet   No No   Sig: Take 1 Tablet by mouth every day.   furosemide (LASIX) 40 MG Tab  Family Member No No   Sig: Take 1 Tablet by mouth every day for 30 days.   guaiFENesin dextromethorphan (ROBITUSSIN DM) 100-10 MG/5ML Syrup syrup   No No   Sig: Take 10 mL by mouth every 6 hours as needed for Cough.   lactulose 20 GM/30ML Solution   No No   Sig: Take 30 mL by mouth 3 times a day. Hold dose if 4 or more soft or loose stools in the last 24 hours, please gradually increase amount per dose by 5mls if you do not have at least 2 stools per day.   omeprazole (PRILOSEC) 40 MG delayed-release capsule   No No   Sig: Take 1 Capsule by mouth every day.   ondansetron (ZOFRAN ODT) 4 MG TABLET DISPERSIBLE  Family Member No No   Sig: Take 1 Tablet by mouth every four hours as needed for Nausea/Vomiting (give PO if no IV route available).   sodium bicarbonate (SODIUM BICARBONATE) 650 MG Tab   No No   Sig: Take 1 Tablet by mouth 2 times a day for 30 days.   spironolactone (ALDACTONE) 25 MG Tab  Family Member No No   Sig: Take 1 Tablet by mouth every day for 30 days.   vitamin D (CHOLECALCIFEROL) 1000 UNIT Tab   No No   Sig: Take 1 Tablet by mouth every day for 30 days.      Facility-Administered Medications: None     Physical Exam  Temp:  [37.5 °C  (99.5 °F)] 37.5 °C (99.5 °F)  Pulse:  [] 83  Resp:  [11-20] 11  BP: (104-116)/(58-82) 104/58  SpO2:  [95 %-98 %] 96 %  Blood Pressure: 116/82   Temperature: 37.5 °C (99.5 °F)   Pulse: 98   Respiration: 18   Pulse Oximetry: 98 %     Physical Exam  Constitutional:       General: She is not in acute distress.     Comments: Lethargic but easily arousable   HENT:      Head: Normocephalic and atraumatic.      Right Ear: External ear normal.      Left Ear: External ear normal.      Nose: No congestion or rhinorrhea.      Mouth/Throat:      Mouth: Mucous membranes are dry.      Pharynx: No oropharyngeal exudate or posterior oropharyngeal erythema.   Eyes:      General: No scleral icterus.        Right eye: No discharge.         Left eye: No discharge.      Conjunctiva/sclera: Conjunctivae normal.      Pupils: Pupils are equal, round, and reactive to light.   Cardiovascular:      Rate and Rhythm: Tachycardia present. Rhythm irregular.      Heart sounds:      No friction rub. No gallop.   Pulmonary:      Effort: Pulmonary effort is normal.   Abdominal:      General: Abdomen is flat. There is no distension.      Tenderness: There is no abdominal tenderness. There is no guarding or rebound.   Musculoskeletal:         General: No swelling.      Cervical back: Neck supple. No rigidity. No muscular tenderness.      Right lower leg: No edema.      Left lower leg: No edema.   Skin:     General: Skin is dry.      Capillary Refill: Capillary refill takes 2 to 3 seconds.      Coloration: Skin is pale. Skin is not jaundiced.      Findings: No bruising or erythema.   Neurological:      Comments: Lethargic but easily arousable  Alert and oriented x 3 when awakened   Psychiatric:      Comments: Lethargic but easily arousable       Laboratory:  Recent Labs     06/03/24  1224   WBC 8.8   RBC 3.16*   HEMOGLOBIN 8.6*   HEMATOCRIT 27.4*   MCV 86.7   MCH 27.2   MCHC 31.4*   RDW 58.9*   PLATELETCT 100*   MPV 9.4     Recent Labs      "06/03/24  1224   SODIUM 129*   POTASSIUM 5.0   CHLORIDE 101   CO2 17*   GLUCOSE 132*   BUN 45*   CREATININE 2.97*   CALCIUM 7.8*     Recent Labs     06/03/24  1224   ALTSGPT <5   ASTSGOT 13   ALKPHOSPHAT 131*   TBILIRUBIN 0.9   GLUCOSE 132*         No results for input(s): \"NTPROBNP\" in the last 72 hours.      No results for input(s): \"TROPONINT\" in the last 72 hours.    Imaging:  CT-HEAD W/O   Final Result      1.  No evidence of acute intracranial process.      2.  Atrophy.         DX-CHEST-PORTABLE (1 VIEW)   Final Result      Cardiomegaly with interstitial edema.        I personally reviewed patient EKG shows atrial fibrillation with a rate of 112.  There is no ST elevation.  There is flattening of T waves in lead II, III and aVL and leads V4-V6.  QTc is 576.    Assessment/Plan:  Justification for Admission Status  I anticipate this patient will require at least two midnights for appropriate medical management, necessitating inpatient admission because patient has acute encephalopathy secondary to hyponatremia, acute kidney injury, and dehydration .  The patient will need intravenous fluids, treat volume status and sodium levels.    Patient will need a Med/Surg bed on MEDICAL service     * Acute encephalopathy- (present on admission)  Assessment & Plan  **CT scan of the head did not show evidence for acute infarction or hemorrhage  I will check an ammonia level [the patient has cirrhosis and is on lactulose however currently has diarrhea and dehydration]  Likely metabolic likely secondary to dehydration, acute kidney injury, and hyponatremia    Anticipate improvement with correcting/treating hyponatremia, dehydration and acute kidney injury  Consider further diagnostic testing /imaging if encephalopathy does not improve with above measures.   Mental status improving with intravenous fluids    ELIZABETH (acute kidney injury) (HCC)- (present on admission)  Assessment & Plan  Mostly due to dehydration secondary to " diarrhea and over medication with diuretics  I will hold lactulose for now.  Consider restarting according to clinical course  I will hold furosemide and spironolactone for now, consider restarting according to clinical course  I will start intravenous fluids  Avoid / minimize nephrotoxins as much as possible.  Monitor inputs and outputs    Hyponatremia- (present on admission)  Assessment & Plan  Hypovolemic hyponatremia  I expect Na to improve with IVF  I am ordering a BMPs every 4 hours, avoid over correction, aim for 8-10 meq correction in 24 hours   1200 mL free water restriction, okay for solute-containing fluids like Gatorade      Metabolic acidosis- (present on admission)  Assessment & Plan  Likely due to reduced intravascular volume and increase bicarb losses through diarrhea and acute kidney injury  I will start intravenous fluids    Anticipate improvement with intravenous fluids  Continue to monitor, I will order follow-up bicarb level        Diarrhea- (present on admission)  Assessment & Plan  Differentials include overmedication with lactulose, viral infection, C. difficile colitis  [has been on antibiotics for UTI]  I will check stool for C. difficile toxins.  I will hold home lactulose for now  Supportive care with intravenous fluids monitor electrolytes and replace accordingly     Dehydration- (present on admission)  Assessment & Plan  Secondary to diarrhea, she is on lactulose.  I will hold lactulose for now  Overmedication with diuretics.  I will hold home diuretics for now    Anemia- (present on admission)  Assessment & Plan  Appears to be chronic  No evidence of gross bleeding.  Monitor hemoglobin. I will order a follow-up CBC.  Transfuse for a hemoglobin of less than or equal to 7.     Type 2 diabetes mellitus with diabetic neuropathy, unspecified (HCC)- (present on admission)  Assessment & Plan  With no hyperglycemia  I will start short acting insulin for now  I will order Accu-Checks,  hypoglycemia protocol  Adjust according to blood sugars trend     Paroxysmal atrial fibrillation- (present on admission)  Assessment & Plan  Resume apixaban for stroke prophylaxis    ACP (advance care planning)- (present on admission)  Assessment & Plan  I had a prolonged discussion with the patient and family [daughter present at bedside in the emergency room] regarding goals of care, diagnoses, prognosis, and CODE STATUS. We discussed her prognosis and comorbidities.  The patient is middle-age with a number of chronic medical problems including atrial fibrillation, diabetes, who was brought from skilled nursing facility for acute encephalopathy, diarrhea and acute kidney injury.  At this point the patient and patient daughter wants to maintain a full code.  They are open to all forms of invasive or noninvasive diagnostic and therapeutic interventions.      GERD (gastroesophageal reflux disease)- (present on admission)  Assessment & Plan  I will start omeprazole    Hyperlipidemia- (present on admission)  Assessment & Plan  Cardiac diet when able to take orally.  I will start atorvastatin      VTE prophylaxis: SCDs/TEDs and therapeutic anticoagulation with apixaban    I had a prolonged discussion with the patient and family [daughter present at bedside in the emergency room] regarding goals of care, diagnoses, prognosis, and CODE STATUS. We discussed her prognosis and comorbidities.  The patient is middle-age with a number of chronic medical problems including atrial fibrillation, diabetes, who was brought from skilled nursing facility for acute encephalopathy, diarrhea and acute kidney injury.  At this point the patient and patient daughter wants to maintain a full code.  They are open to all forms of invasive or noninvasive diagnostic and therapeutic interventions.  I spent 17 minutes on advanced care planning

## 2024-06-03 NOTE — ASSESSMENT & PLAN NOTE
6/7 patient was DNR/DNI in the past, then changed it full code during this admission.  Patient admitted for A-fib RVR, encephalopathy, ELIZABETH.  not a candidate for dialysis per nephrology. Nephrology recommended a comfort care. History of alcoholic cirrhosis, diabetes . Poor functional status and prognosis.  The palliative care discussed with the patient's daughter, who discussed with the brother.  I had a lengthy discussion with the daughter and emphasized the poor prognosis of the patient.  However, the daughter and the son decided to continue full resuscitation, including dialysis if possible.  Continue full code.  ACP 16 minutes.

## 2024-06-03 NOTE — ED NOTES
Bedside report received from off going RN/tech: Serjio, assumed care of patient.  POC discussed with patient. Call light within reach, all needs addressed at this time.     Fall risk interventions in place: Move the patient closer to the nurse's station, Patient's personal possessions are with in their safe reach, Place fall risk sign on patient's door, and Keep floor surfaces clean and dry (all applicable per [place] Fall risk assessment)   Continuous monitoring: Cardiac Leads, Pulse Ox, or Blood Pressure  IVF/IV medications: Infusion per MAR (List Med(s)) LR  Oxygen: How many liters 2L  Bedside sitter: Not Applicable   Isolation: Not Applicable

## 2024-06-03 NOTE — ASSESSMENT & PLAN NOTE
Resume apixaban for stroke prophylaxis  Resume oral metoprolol  As needed Lopressor pushes    6/7 still afib RVR  I increased metoprolol to 50 mg twice daily, IV metoprolol as needed  6/8 still A-fib RVR, I increased the metoprolol to 100 mg twice daily  6/9 HR improving.  BP stable  Continue telemetry monitor

## 2024-06-04 ENCOUNTER — APPOINTMENT (OUTPATIENT)
Dept: RADIOLOGY | Facility: MEDICAL CENTER | Age: 64
DRG: 640 | End: 2024-06-04
Attending: HOSPITALIST
Payer: MEDICAID

## 2024-06-04 ENCOUNTER — APPOINTMENT (OUTPATIENT)
Dept: PHYSICAL THERAPY | Facility: REHABILITATION | Age: 64
End: 2024-06-04
Attending: INTERNAL MEDICINE
Payer: MEDICAID

## 2024-06-04 LAB
ALBUMIN SERPL BCP-MCNC: 2.3 G/DL (ref 3.2–4.9)
ALBUMIN/GLOB SERPL: 0.6 G/DL
ALP SERPL-CCNC: 117 U/L (ref 30–99)
ALT SERPL-CCNC: <5 U/L (ref 2–50)
AMMONIA PLAS-SCNC: 71 UMOL/L (ref 11–45)
ANION GAP SERPL CALC-SCNC: 10 MMOL/L (ref 7–16)
APPEARANCE UR: ABNORMAL
AST SERPL-CCNC: 12 U/L (ref 12–45)
BACTERIA #/AREA URNS HPF: NEGATIVE /HPF
BILIRUB SERPL-MCNC: 0.7 MG/DL (ref 0.1–1.5)
BILIRUB UR QL STRIP.AUTO: ABNORMAL
BUN SERPL-MCNC: 50 MG/DL (ref 8–22)
CALCIUM ALBUM COR SERPL-MCNC: 8.9 MG/DL (ref 8.5–10.5)
CALCIUM SERPL-MCNC: 7.5 MG/DL (ref 8.5–10.5)
CHLORIDE SERPL-SCNC: 103 MMOL/L (ref 96–112)
CO2 SERPL-SCNC: 16 MMOL/L (ref 20–33)
COLOR UR: ABNORMAL
CREAT SERPL-MCNC: 2.96 MG/DL (ref 0.5–1.4)
EPI CELLS #/AREA URNS HPF: ABNORMAL /HPF
ERYTHROCYTE [DISTWIDTH] IN BLOOD BY AUTOMATED COUNT: 58.4 FL (ref 35.9–50)
GFR SERPLBLD CREATININE-BSD FMLA CKD-EPI: 17 ML/MIN/1.73 M 2
GLOBULIN SER CALC-MCNC: 3.8 G/DL (ref 1.9–3.5)
GLUCOSE BLD STRIP.AUTO-MCNC: 115 MG/DL (ref 65–99)
GLUCOSE BLD STRIP.AUTO-MCNC: 92 MG/DL (ref 65–99)
GLUCOSE BLD STRIP.AUTO-MCNC: 98 MG/DL (ref 65–99)
GLUCOSE BLD STRIP.AUTO-MCNC: 99 MG/DL (ref 65–99)
GLUCOSE SERPL-MCNC: 119 MG/DL (ref 65–99)
GLUCOSE UR STRIP.AUTO-MCNC: NEGATIVE MG/DL
HCT VFR BLD AUTO: 26.4 % (ref 37–47)
HGB BLD-MCNC: 8.1 G/DL (ref 12–16)
HYALINE CASTS #/AREA URNS LPF: ABNORMAL /LPF
KETONES UR STRIP.AUTO-MCNC: ABNORMAL MG/DL
LEUKOCYTE ESTERASE UR QL STRIP.AUTO: ABNORMAL
MAGNESIUM SERPL-MCNC: 1.7 MG/DL (ref 1.5–2.5)
MCH RBC QN AUTO: 26.3 PG (ref 27–33)
MCHC RBC AUTO-ENTMCNC: 30.7 G/DL (ref 32.2–35.5)
MCV RBC AUTO: 85.7 FL (ref 81.4–97.8)
MICRO URNS: ABNORMAL
NITRITE UR QL STRIP.AUTO: NEGATIVE
PH UR STRIP.AUTO: 5 [PH] (ref 5–8)
PLATELET # BLD AUTO: 93 K/UL (ref 164–446)
PLATELETS.RETICULATED NFR BLD AUTO: 1.2 % (ref 0.6–13.1)
PMV BLD AUTO: 9.9 FL (ref 9–12.9)
POTASSIUM SERPL-SCNC: 5.3 MMOL/L (ref 3.6–5.5)
PROCALCITONIN SERPL-MCNC: 0.68 NG/ML
PROT SERPL-MCNC: 6.1 G/DL (ref 6–8.2)
PROT UR QL STRIP: 100 MG/DL
RBC # BLD AUTO: 3.08 M/UL (ref 4.2–5.4)
RBC # URNS HPF: ABNORMAL /HPF
RBC UR QL AUTO: ABNORMAL
SODIUM SERPL-SCNC: 129 MMOL/L (ref 135–145)
SP GR UR STRIP.AUTO: 1.02
TRANS CELLS #/AREA URNS HPF: ABNORMAL /HPF
TROPONIN T SERPL-MCNC: 35 NG/L (ref 6–19)
UROBILINOGEN UR STRIP.AUTO-MCNC: 1 MG/DL
WBC # BLD AUTO: 8.8 K/UL (ref 4.8–10.8)
WBC #/AREA URNS HPF: ABNORMAL /HPF

## 2024-06-04 PROCEDURE — 92610 EVALUATE SWALLOWING FUNCTION: CPT

## 2024-06-04 PROCEDURE — 71045 X-RAY EXAM CHEST 1 VIEW: CPT

## 2024-06-04 PROCEDURE — A9270 NON-COVERED ITEM OR SERVICE: HCPCS | Performed by: HOSPITALIST

## 2024-06-04 PROCEDURE — 99233 SBSQ HOSP IP/OBS HIGH 50: CPT | Performed by: HOSPITALIST

## 2024-06-04 PROCEDURE — 36415 COLL VENOUS BLD VENIPUNCTURE: CPT

## 2024-06-04 PROCEDURE — 80053 COMPREHEN METABOLIC PANEL: CPT

## 2024-06-04 PROCEDURE — 700102 HCHG RX REV CODE 250 W/ 637 OVERRIDE(OP): Performed by: HOSPITALIST

## 2024-06-04 PROCEDURE — 85055 RETICULATED PLATELET ASSAY: CPT

## 2024-06-04 PROCEDURE — 87086 URINE CULTURE/COLONY COUNT: CPT

## 2024-06-04 PROCEDURE — 93005 ELECTROCARDIOGRAM TRACING: CPT | Performed by: HOSPITALIST

## 2024-06-04 PROCEDURE — 700111 HCHG RX REV CODE 636 W/ 250 OVERRIDE (IP): Mod: JZ

## 2024-06-04 PROCEDURE — 84145 PROCALCITONIN (PCT): CPT

## 2024-06-04 PROCEDURE — 770020 HCHG ROOM/CARE - TELE (206)

## 2024-06-04 PROCEDURE — 81001 URINALYSIS AUTO W/SCOPE: CPT

## 2024-06-04 PROCEDURE — 82962 GLUCOSE BLOOD TEST: CPT | Mod: 91

## 2024-06-04 PROCEDURE — 82140 ASSAY OF AMMONIA: CPT

## 2024-06-04 PROCEDURE — 87186 SC STD MICRODIL/AGAR DIL: CPT

## 2024-06-04 PROCEDURE — 87077 CULTURE AEROBIC IDENTIFY: CPT

## 2024-06-04 PROCEDURE — 85027 COMPLETE CBC AUTOMATED: CPT

## 2024-06-04 PROCEDURE — 83735 ASSAY OF MAGNESIUM: CPT

## 2024-06-04 PROCEDURE — 84484 ASSAY OF TROPONIN QUANT: CPT

## 2024-06-04 RX ORDER — LACTULOSE 20 G/30ML
30 SOLUTION ORAL 3 TIMES DAILY
Status: DISCONTINUED | OUTPATIENT
Start: 2024-06-04 | End: 2024-06-05

## 2024-06-04 RX ORDER — METOPROLOL TARTRATE 1 MG/ML
5 INJECTION, SOLUTION INTRAVENOUS
Status: COMPLETED | OUTPATIENT
Start: 2024-06-04 | End: 2024-06-06

## 2024-06-04 RX ORDER — FUROSEMIDE 10 MG/ML
40 INJECTION INTRAMUSCULAR; INTRAVENOUS ONCE
Status: COMPLETED | OUTPATIENT
Start: 2024-06-04 | End: 2024-06-04

## 2024-06-04 RX ORDER — LACTULOSE 10 G/15ML
300 SOLUTION ORAL ONCE
Status: DISPENSED | OUTPATIENT
Start: 2024-06-04 | End: 2024-06-05

## 2024-06-04 RX ADMIN — APIXABAN 5 MG: 5 TABLET, FILM COATED ORAL at 05:27

## 2024-06-04 RX ADMIN — CLOTRIMAZOLE AND BETAMETHASONE DIPROPIONATE 1 APPLICATION: 10; .5 CREAM TOPICAL at 05:28

## 2024-06-04 RX ADMIN — FUROSEMIDE 40 MG: 10 INJECTION INTRAMUSCULAR; INTRAVENOUS at 00:48

## 2024-06-04 RX ADMIN — OMEPRAZOLE 20 MG: 20 CAPSULE, DELAYED RELEASE ORAL at 05:27

## 2024-06-04 RX ADMIN — GABAPENTIN 400 MG: 400 CAPSULE ORAL at 05:27

## 2024-06-04 ASSESSMENT — PAIN DESCRIPTION - PAIN TYPE
TYPE: ACUTE PAIN
TYPE: ACUTE PAIN

## 2024-06-04 ASSESSMENT — FIBROSIS 4 INDEX: FIB4 SCORE: 3.89

## 2024-06-04 NOTE — PROGRESS NOTES
Received report from off-going nurse.   Assumed pt care at change of shift.   Assessment completed.   Pt is A&Ox 1, pt is lethargic, arouses to voice, but does not stay awake for long enough to ask questions, vital signs are stable on 2L of oxygen (baseline).    no apparent signs of discomfort.   Bed is in low and locked position, call light and belongings in reach, reminded to use call light, bed alarm on.   No needs at this time.

## 2024-06-04 NOTE — ED NOTES
"Daily Note   Today's date: 24  Patient name: Ifeanyi Shin Jr.  : 2003  MRN: [id number]  Referring provider: Tony Snow MD  Dx:   Encounter Diagnosis     ICD-10-CM    1. S/P arthroscopic reconstruction of ACL of left knee using quadriceps tendon autograft  Z98.890     Z87.39         1 on 1 [id number]  IEP [id number]  Start Time: 700  Stop Time: 800  Total time in clinic (min): 60 minutes    Subjective: Patient returns from [place].  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        Objective: See treatment diary below    Assessment: Tolerated treatment well.  Patient was fatigued at the end of today's session.        Plan: Continue per plan of care.  PT focused on agility and plyometrics today.  Consider BFR in the near future as well as more deadlifts and squats (squat rack).      Precautions: ACL Reconstruction performed on 10/6    Daily Treatment Log  Manuals  6/4    Tib Glides        GISTM    [facility]    MFD[facility]         PROM        TKE Stretching        Psoas Release        FR  [facility]    Neuro Re-Ed   6/4    NMES QS        NMES TC        BFR         Ther Ex      Bike         Heel slides        Wall Slides        Wall Slides Weighted        Heel Prop        Squat with HR and Table Assist        EOT Flexion Stretch         ATG split squat 8\" box 3x10 c 4\" ecc 8\" box 3 x 10 ea              Standing clamshell / Lateral band walk  Black TB 2x   Resisted SS 3x  Monster Walks 3x Black TB 2x   Resisted SS 3x  Monster Walks 3x Black TB 2x   Resisted SS 5x  Monster Walks 5x    TKE        LAQ [place] 25# 4x15 Dorothea 25# 4 x 15 ea Dor[place]a 25# 4 x 15 ea [place] 25# 4 x 15 ea    Bird Dips   Surge 4 x 10 ea Surge 4 x 10  Surge 4 x 10 ea    HR Squats Banded BB Hi- Squat H-E 65# 3x5       Slider HS Curls         Hexbar DL BB RDL 75# 3x12 c 4 sec ecc       Temper Tantrums        Add Sliders    #20KB Pink " Assist RN: Pt back from imaging   "TB  3 x 10 ea      HS Curls         Leg Press  #150 DL 4 x 10  #155 DL 4 x 10  #75 SL 4 x 10  #100 DL to SL 2 x 10    [place] curls        Hip Flexion        Ther Activity  5/20 5/23 6/4     M4 Tank Lvl 3 +Sal  Fwd / Retro 5x50'  Lateral 4x20'       Agility Ladder  5x ea:   Double Tap  Single Tap  Typewriter  Icky   Rev Icky  Stutter Step 3x ea:   Double Tap  Single Tap  Typewriter  Icky   Rev Icky  Stutter Step 5x ea:   Double Tap  Single Tap  Typewriter  Icky   Rev Icky  Stutter Step    Assisted Band Hops   Purple   30x ea  Purple   30x ea Purple   2 x 30 ea     Depth Drops    12\" 2 x 10  12\" 3 x 10    Box Jumps    12\" 2 x 10  12\" 3 x 10     Gait Training 5/16 5/20 5/23 6/4    BOOST L 70%  1':1' WJWJ retro x 3  1':2' WJWJ x4 L 75%  5' Retro  15' JWJW L 80%  5' Retro   15' JWJW     Treadmill     Jogging 10'     Modalities                          "

## 2024-06-04 NOTE — PROGRESS NOTES
Received bedside report from previous RN, patient is lethargic, easily aroused, confused in conversation at times. On o2 at 2L via NC with o2 sat of 96-98% at this time. With ongoing IVF NS at 83 ml/hr infusing well. Fall precautions in place and call light within reach. All other needs met at this time.

## 2024-06-04 NOTE — PROGRESS NOTES
Admit profile not completed at this time. The patient is confused in conversation and unable to obtain clear answers from questions asked.

## 2024-06-04 NOTE — PROGRESS NOTES
RN and Speech assessed patient together for swallowing eval. Pt still lethargic and confused. Speech recommended patient change to NPO due to inability to follow directions and stay alert. RN held PO medications.

## 2024-06-04 NOTE — CARE PLAN
The patient is Watcher - Medium risk of patient condition declining or worsening    Shift Goals  Clinical Goals: Monitor labs and vital signs  Patient Goals: rest  Family Goals: gisele    Patient is not progressing towards the following goals:    Problem: Knowledge Deficit - Standard  Goal: Patient and family/care givers will demonstrate understanding of plan of care, disease process/condition, diagnostic tests and medications  Description: Target End Date:  1-3 days or as soon as patient condition allows    Document in Patient Education    1.  Patient and family/caregiver oriented to unit, equipment, visitation policy and means for communicating concern  2.  Complete/review Learning Assessment  3.  Assess knowledge level of disease process/condition, treatment plan, diagnostic tests and medications  4.  Explain disease process/condition, treatment plan, diagnostic tests and medications  6/4/2024 0721 by Som Roberts, R.N.  Outcome: Not Progressing  6/4/2024 0721 by Som Roberts, R.N.  Outcome: Not Progressing  6/4/2024 0649 by Som Roberts, R.N.  Outcome: Not Progressing     Problem: Respiratory  Goal: Patient will achieve/maintain optimum respiratory ventilation and gas exchange  Description: Target End Date:  Prior to discharge or change in level of care    Document on Assessment flowsheet    1.  Assess and monitor rate, rhythm, depth and effort of respiration  2.  Breath sounds assessed qshift and/or as needed  3.  Assess O2 saturation, administer/titrate oxygen as ordered  4.  Position patient for maximum ventilatory efficiency  5.  Turn, cough, and deep breath with splinting to improve effectiveness  6.  Collaborate with RT to administer medication/treatments per order  7.  Encourage use of incentive spirometer and encourage patient to cough after use and utilize splinting techniques if applicable  8.  Airway suctioning  9.  Monitor sputum production for changes in color, consistency and  frequency  10. Perform frequent oral hygiene  11. Alternate physical activity with rest periods  Outcome: Not Progressing

## 2024-06-04 NOTE — PROGRESS NOTES
NOC APRN CROSS COVER NOTE      Notified by nursing that patient is complaining of shortness of breath and difficulty breathing.     Repeat chest x-ray demonstrates pulmonary edema and/or infiltrates which appear somewhat increased since the prior exam.     I have discontinued IV fluids and ordered a one time 40 mg IV lasix. Additional orders for procalcitonin on AM labs.     Yashira Rogers ACNPC-AG, NOC Hospitalist APRN

## 2024-06-04 NOTE — DISCHARGE PLANNING
Case Management Discharge Planning    Admission Date: 6/3/2024  GMLOS: 9  ALOS: 1    6-Clicks ADL Score:    6-Clicks Mobility Score:    PT and/or OT Eval ordered: No  Post-acute Referrals Ordered: Yes  Post-acute Choice Obtained: Yes  Has referral(s) been sent to post-acute provider:  Yes      Anticipated Discharge Dispo: Discharge Disposition:   D/T to SNF with Medicare cert in anticipation of skilled care (03)    Action(s) Taken:   Pt discharged to [facility] on 5-25-24 and is a readmission.  RN CCM went to patient's hospital room.  Pt lethargic.    RN CCM spoke with patient's daughter, Ava via telephone.  She requested status update from physician.  Voalte msg sent to Dr. Daley to request he call patient's daughter.    Pt's daughter agreeable to patient returning to [facility] when medically clear.  Verbal for choice form obtained.  Form faxed to [facility].  Request to Dr. Daley for OT/PT orders please.    Medically Clear: No    Next Steps:   Complete assessment.  Follow up with [facility].    Barriers to Discharge: Medical clearance and Pending Placement

## 2024-06-04 NOTE — PROGRESS NOTES
Patient's breathing is deeper than usual. On o2 at 2L via NC with o2 sat at 96-98% . Charge nurse, RT, and hospitalist were informed and ordered a chest x-ray stat, to hold Iv fluids, and furosemide 40 mg IV.

## 2024-06-04 NOTE — CARE PLAN
The patient is Watcher - Medium risk of patient condition declining or worsening    Shift Goals  Clinical Goals: Pts mentation will improve to AxO 3 by the end of shift.  Patient Goals: Rest  Family Goals: EDMUND  Pt AxO x3 during reassessment in afternoon with speech therapy, however, pt remains very confused and lethargic.     Progress made toward(s) clinical / shift goals:    Problem: Fall Risk  Goal: Patient will remain free from falls  Outcome: Progressing   Pt remained free from falls throughout shift with education on the use of call light, bed alarm on, and belongings within reach at all times.     Patient is not progressing towards the following goals:  Problem: Knowledge Deficit - Standard  Goal: Patient and family/care givers will demonstrate understanding of plan of care, disease process/condition, diagnostic tests and medications  Outcome: Not Met   Pt unable to verbalize understanding of plan of care and need for medications.

## 2024-06-04 NOTE — THERAPY
"Speech Language Pathology   Clinical Swallow Evaluation     Patient Name: April Alicia  AGE:  64 y.o., SEX:  female  Medical Record #: [id number]  Date of Service: 6/4/2024      History of Present Illness  Admit notes-April Alicia is a 64 y.o. female with a past medical history of elevated BMI of 26, diabetes, atrial fibrillation on anticoagulation, recent diagnosis of urinary tract infection who presented 6/3/2024 with generalized weakness diarrhea and palpitations.  Patient has not been feeling well over the past 1 day.  She has been progressively worsening confusion generalized weakness after a few episodes of diarrhea.  She was found to have atrial fibrillation with rapid ventricular response.    Chest 6/3-1.  Pulmonary edema and/or infiltrates are identified, which appear somewhat increased since the prior exam.  2.  Cardiomegaly    PMH- no prior SLP, see EMR for complete hist.     General Information:  Vitals  O2 Delivery Device: (P) Silicone Nasal Cannula  Level of Consciousness: (P) Drowsy  Patient Behaviors: (P) Irritable  Orientation: (P) General place, Current month, Self  Follows Directives: (P) Yes - simple commands only      Prior Living Situation & Level of Function:  Prior Services: (P)  (see OT notes-intermittent phyical support for ADL per family)     Communication: (P) unable to determine  Swallowing: (P) unable to determine.       Oral Mechanism Evaluation:  Dentition: (P) Upper denture, Lower denture   Facial Symmetry: (P) Equal        Labial Observations: (P) WFL   Lingual Observations: (P) Midline (possible angio edema with MD to assess. Pt does not complain of lingual pain.)  Motor Speech: (P) Mild slowness in rate of speech.            Laryngeal Function:  Secretion Management: (P) Expectoration of secretions           Cough: (P) Perceptually WNL         Subjective  (P) \"Give me my food.\"      Assessment  Current Method of Nutrition: (P) Oral diet CHO  Positioning: (P) " Douglas's (60-90 degrees)  Bolus Administration: (P) SLP    O2 Delivery Device: (P) Silicone Nasal Cannula  Factor(s) Affecting Performance: (P) Impaired mental status, Impaired command following  Tracheostomy : (P) No          Swallowing Trials:  Swallowing Trials  Ice: (P) WFL  Thin Liquid (TN0): (P) Impaired  Liquidised (LQ3): (P) WFL      Comments: Awakens with verbal and tactile stimulation. Upper denture attempted to be placed but ill fitting and in probable need of adhesive. Possible angio edema with tongue appearing larger in size with MD and RN informed. Pt did not complain of lingual pain or numbness when asked. Pt with decreased attention to task and talking with oral bolus. Varying timing of laryngeal elevation with swallow triggered in 1-5 seconds depending on pt's attention to task. One cough post TNO by spoon. No coughing post TNO by cup, single ice chips, and small tsp amounts of applesauce. Near end of eval, pt more irritable, repetitive, and not following simple directives.         Clinical Impressions  Pt with s/s of being altered and at higher risks for dysphagia related pulmonary complication. Concerns regarding pt's 6/3 chest xray demonstrating possible infiltrates. Additionally, pt's current decreased mental status and varying wakefulness places her at higher risks for unsafe PO intake. SLP collaborated with RN and MD regarding consideration for NPO, single ice chips with staff after oral care to  mitigate xerostomia and non-use atrophy, and SLP to see WED.     Recommendations  Diet Consistency: (P) NPO, consider NPO related to s/s of being altered and irritable.  Instrumentation: (P) None indicated at this time  Medication: (P) Non Oral           Oral Care: (P) Q4h         SLP Treatment Plan  Treatment Plan: (P) Dysphagia Treatment  SLP Frequency: (P) 3x Per Week  Estimated Duration: (P) Until Therapy Goals Met      Anticipated Discharge Needs  Discharge Recommendations: (P) Anticipate that  "the patient will have no further speech therapy needs after discharge from the hospital   Therapy Recommendations Upon DC: (P) Dysphagia Training        Patient / Family Goals  Patient / Family Goal #1: (P) \"give me my food.\"  Short Term Goals  Short Term Goal # 1: (P) Pt will follow simple directives during 1-1 feeding with min cues for redirection and attention.  Goal Outcome # 1: (P) Goal not met  Short Term Goal # 2: (P) Pt will consume single ice chips with staff without s/s of difficulty.  Goal Outcome # 2 : (P) Goal not met      Shruthi Yu, SLP   "

## 2024-06-05 LAB
ALBUMIN SERPL BCP-MCNC: 2.4 G/DL (ref 3.2–4.9)
AMMONIA PLAS-SCNC: 28 UMOL/L (ref 11–45)
BUN SERPL-MCNC: 55 MG/DL (ref 8–22)
CALCIUM ALBUM COR SERPL-MCNC: 9.3 MG/DL (ref 8.5–10.5)
CALCIUM SERPL-MCNC: 8 MG/DL (ref 8.5–10.5)
CHLORIDE SERPL-SCNC: 101 MMOL/L (ref 96–112)
CO2 SERPL-SCNC: 18 MMOL/L (ref 20–33)
CREAT SERPL-MCNC: 2.83 MG/DL (ref 0.5–1.4)
EKG IMPRESSION: NORMAL
ERYTHROCYTE [DISTWIDTH] IN BLOOD BY AUTOMATED COUNT: 57.9 FL (ref 35.9–50)
GFR SERPLBLD CREATININE-BSD FMLA CKD-EPI: 18 ML/MIN/1.73 M 2
GLUCOSE BLD STRIP.AUTO-MCNC: 104 MG/DL (ref 65–99)
GLUCOSE BLD STRIP.AUTO-MCNC: 113 MG/DL (ref 65–99)
GLUCOSE BLD STRIP.AUTO-MCNC: 91 MG/DL (ref 65–99)
GLUCOSE BLD STRIP.AUTO-MCNC: 93 MG/DL (ref 65–99)
GLUCOSE SERPL-MCNC: 104 MG/DL (ref 65–99)
HCT VFR BLD AUTO: 29.1 % (ref 37–47)
HGB BLD-MCNC: 9.1 G/DL (ref 12–16)
MCH RBC QN AUTO: 26.5 PG (ref 27–33)
MCHC RBC AUTO-ENTMCNC: 31.3 G/DL (ref 32.2–35.5)
MCV RBC AUTO: 84.8 FL (ref 81.4–97.8)
NT-PROBNP SERPL IA-MCNC: 4762 PG/ML (ref 0–125)
PHOSPHATE SERPL-MCNC: 5.5 MG/DL (ref 2.5–4.5)
PLATELET # BLD AUTO: 109 K/UL (ref 164–446)
PMV BLD AUTO: 10.4 FL (ref 9–12.9)
POTASSIUM SERPL-SCNC: 5.2 MMOL/L (ref 3.6–5.5)
PROCALCITONIN SERPL-MCNC: 0.64 NG/ML
RBC # BLD AUTO: 3.43 M/UL (ref 4.2–5.4)
SODIUM SERPL-SCNC: 131 MMOL/L (ref 135–145)
WBC # BLD AUTO: 8.8 K/UL (ref 4.8–10.8)

## 2024-06-05 PROCEDURE — 99233 SBSQ HOSP IP/OBS HIGH 50: CPT | Performed by: INTERNAL MEDICINE

## 2024-06-05 PROCEDURE — 700111 HCHG RX REV CODE 636 W/ 250 OVERRIDE (IP): Performed by: INTERNAL MEDICINE

## 2024-06-05 PROCEDURE — 770020 HCHG ROOM/CARE - TELE (206)

## 2024-06-05 PROCEDURE — 82962 GLUCOSE BLOOD TEST: CPT | Mod: 91

## 2024-06-05 PROCEDURE — 36415 COLL VENOUS BLD VENIPUNCTURE: CPT

## 2024-06-05 PROCEDURE — 82140 ASSAY OF AMMONIA: CPT

## 2024-06-05 PROCEDURE — A9270 NON-COVERED ITEM OR SERVICE: HCPCS | Performed by: HOSPITALIST

## 2024-06-05 PROCEDURE — 93010 ELECTROCARDIOGRAM REPORT: CPT | Performed by: INTERNAL MEDICINE

## 2024-06-05 PROCEDURE — 700102 HCHG RX REV CODE 250 W/ 637 OVERRIDE(OP): Performed by: HOSPITALIST

## 2024-06-05 PROCEDURE — 80069 RENAL FUNCTION PANEL: CPT

## 2024-06-05 PROCEDURE — 700102 HCHG RX REV CODE 250 W/ 637 OVERRIDE(OP): Performed by: INTERNAL MEDICINE

## 2024-06-05 PROCEDURE — 83880 ASSAY OF NATRIURETIC PEPTIDE: CPT

## 2024-06-05 PROCEDURE — 85027 COMPLETE CBC AUTOMATED: CPT

## 2024-06-05 PROCEDURE — 92526 ORAL FUNCTION THERAPY: CPT

## 2024-06-05 PROCEDURE — 700101 HCHG RX REV CODE 250: Performed by: HOSPITALIST

## 2024-06-05 PROCEDURE — 97166 OT EVAL MOD COMPLEX 45 MIN: CPT

## 2024-06-05 PROCEDURE — 84145 PROCALCITONIN (PCT): CPT

## 2024-06-05 PROCEDURE — 92612 ENDOSCOPY SWALLOW (FEES) VID: CPT

## 2024-06-05 PROCEDURE — A9270 NON-COVERED ITEM OR SERVICE: HCPCS | Performed by: INTERNAL MEDICINE

## 2024-06-05 RX ORDER — CEFAZOLIN SODIUM 1 G/50ML
1 INJECTION, SOLUTION INTRAVENOUS EVERY 12 HOURS
Status: DISCONTINUED | OUTPATIENT
Start: 2024-06-05 | End: 2024-06-07

## 2024-06-05 RX ORDER — LACTULOSE 20 G/30ML
30 SOLUTION ORAL 2 TIMES DAILY
Status: DISCONTINUED | OUTPATIENT
Start: 2024-06-06 | End: 2024-06-09

## 2024-06-05 RX ORDER — LABETALOL HYDROCHLORIDE 5 MG/ML
10 INJECTION, SOLUTION INTRAVENOUS EVERY 8 HOURS
Status: DISCONTINUED | OUTPATIENT
Start: 2024-06-05 | End: 2024-06-05

## 2024-06-05 RX ADMIN — METOPROLOL TARTRATE 12.5 MG: 25 TABLET, FILM COATED ORAL at 16:54

## 2024-06-05 RX ADMIN — GABAPENTIN 400 MG: 400 CAPSULE ORAL at 16:54

## 2024-06-05 RX ADMIN — METOPROLOL TARTRATE 5 MG: 5 INJECTION INTRAVENOUS at 01:10

## 2024-06-05 RX ADMIN — CEFAZOLIN SODIUM 1 G: 1 INJECTION, SOLUTION INTRAVENOUS at 12:43

## 2024-06-05 RX ADMIN — LABETALOL HYDROCHLORIDE 10 MG: 5 INJECTION INTRAVENOUS at 13:08

## 2024-06-05 RX ADMIN — METOPROLOL TARTRATE 5 MG: 5 INJECTION INTRAVENOUS at 04:06

## 2024-06-05 RX ADMIN — ATORVASTATIN CALCIUM 20 MG: 20 TABLET, FILM COATED ORAL at 20:38

## 2024-06-05 RX ADMIN — CLOTRIMAZOLE AND BETAMETHASONE DIPROPIONATE 1 APPLICATION: 10; .5 CREAM TOPICAL at 05:34

## 2024-06-05 RX ADMIN — APIXABAN 5 MG: 5 TABLET, FILM COATED ORAL at 16:54

## 2024-06-05 RX ADMIN — CEFAZOLIN SODIUM 1 G: 1 INJECTION, SOLUTION INTRAVENOUS at 16:53

## 2024-06-05 RX ADMIN — RIFAXIMIN 550 MG: 550 TABLET ORAL at 16:54

## 2024-06-05 RX ADMIN — CLOTRIMAZOLE AND BETAMETHASONE DIPROPIONATE 1 APPLICATION: 10; .5 CREAM TOPICAL at 17:08

## 2024-06-05 ASSESSMENT — COGNITIVE AND FUNCTIONAL STATUS - GENERAL
PERSONAL GROOMING: A LOT
MOVING TO AND FROM BED TO CHAIR: TOTAL
TURNING FROM BACK TO SIDE WHILE IN FLAT BAD: A LOT
SUGGESTED CMS G CODE MODIFIER MOBILITY: CK
EATING MEALS: A LOT
DAILY ACTIVITIY SCORE: 12
TOILETING: A LOT
DRESSING REGULAR UPPER BODY CLOTHING: A LOT
SUGGESTED CMS G CODE MODIFIER DAILY ACTIVITY: CL
DRESSING REGULAR UPPER BODY CLOTHING: A LOT
HELP NEEDED FOR BATHING: A LOT
SUGGESTED CMS G CODE MODIFIER DAILY ACTIVITY: CL
PERSONAL GROOMING: A LOT
EATING MEALS: A LOT
DAILY ACTIVITIY SCORE: 12
DRESSING REGULAR LOWER BODY CLOTHING: A LOT
MOBILITY SCORE: 17
DRESSING REGULAR LOWER BODY CLOTHING: A LOT
MOVING FROM LYING ON BACK TO SITTING ON SIDE OF FLAT BED: A LOT
HELP NEEDED FOR BATHING: A LOT
TOILETING: A LOT

## 2024-06-05 ASSESSMENT — PAIN DESCRIPTION - PAIN TYPE
TYPE: ACUTE PAIN
TYPE: ACUTE PAIN

## 2024-06-05 ASSESSMENT — FIBROSIS 4 INDEX: FIB4 SCORE: 3.32

## 2024-06-05 ASSESSMENT — ACTIVITIES OF DAILY LIVING (ADL): TOILETING: INDEPENDENT

## 2024-06-05 NOTE — PROGRESS NOTES
Rapid Response Summary     Rapid response called at 1800 for: Chest Pain     VS: Tachycardia  and Hypotensive (See Vitals Flowsheet)  Additional info:   MD Paged: Dr. Daley  Interventions:    Imaging/Tests: Chest X-ray and EKG    Labs: Troponin   Medications:  n/a   Other: N/A  Disposition: Improved with rapid response team interventions. Primary RN updated on plan of care. Rapid team will continue to follow the patient.  and Patient transferred to telemetry.

## 2024-06-05 NOTE — THERAPY
"Speech Language Pathology   Daily Treatment     Patient Name: April Alicia  AGE:  64 y.o., SEX:  female  Medical Record #: [id number]  Date of Service: 6/5/2024      Precautions: Fall risk, Swallow precautions      Subjective  Pt agreeable and cooperative with SLP tx tasks despite poor learning evidence to SLP education from this and prior session. \"I need to get some food.\"      Assessment  Pt seen for dysphagia management. Seated in Douglas's in bed, on 2L NC satting in 90s. Mild xerostomia noted upon SLP arrival, which improved after trials of ice chips.Trials of ice chips, TN0, and LQ3 provided. Appropriate oral bolus stripping and containment. Presumed complete AP transit with effective bolus formation evidenced by complete clearance of bolus from oral cavity. Appropriate labial seal for straw suction. One to two swallows appreciated per bolus. Consistent throat clear after trials of TN0 and ice, which is concerning for airway invasion. Pt reports pain with cough, swallow, and speech. Agreeable to FEES but is likely to need reinforcement.       Clinical Impressions  Pt continues to present with clinical indicators of oropharyngeal dysphagia and is at elevated risk for same given AMS and history of GERD. Pt would benefit from further evaluation of swallow using FEES prior to meaningful initiation of PO. Pt should have ice chips after oral care with assistance from trained staff to mitigate the impacts of xerostomia and disuse atrophy as well as to provide comfort and aid with secretion management.      Recommendations  NPO / ice chips  Instrumentation: FEES  Medication:  Defer to DO  Oral Care: Q4h      SLP Treatment Plan  Treatment Plan: Dysphagia Treatment, Patient/Family/Caregiver Training  SLP Frequency: 3x Per Week  Estimated Duration: Until Therapy Goals Met      Anticipated Discharge Needs  Discharge Recommendations: Recommend post-acute placement for additional speech therapy services prior to " "discharge home (changes pending FEES)  Therapy Recommendations Upon DC: Dysphagia Training, Community Re-Integration, Patient / Family / Caregiver Education      Patient / Family Goals  Patient / Family Goal #1: \"give me my food.\"  Goal #1 Outcome: Progressing slower than expected  Short Term Goals  Short Term Goal # 1: Pt will follow simple directives during 1-1 feeding with min cues for redirection and attention.  Goal Outcome # 1: Progressing as expected  Short Term Goal # 2: Pt will consume single ice chips with staff without s/s of difficulty.  Goal Outcome # 2 : Progressing as expected      Deborah Rodriges, SLP  "

## 2024-06-05 NOTE — PROGRESS NOTES
Hospital Medicine Daily Progress Note    Date of Service  6/4/2024    Chief Complaint  April Alicia is a 64 y.o. female admitted 6/3/2024 with diarrhea generalized weakness    Hospital Course  No notes on file    Interval Problem Update  6/4: Ordered SLP but patient failed.  Due to mental status ammonia ava to 71.  Made n.p.o. and may need to try a lactulose enema.  Chest x-ray showed pulmonary edema so DC'd IV fluid and giving a dose of Lasix.  Daughter feels patient becomes confused when she has a UTI, collected urine which is pending.  Chest pain rapid response, developed A-fib RVR after metoprolol was held so transferring to telemetry for IV metoprolol.  Confirmed full code per daughter after discussion on phone and per recent POLST.  Total time 59 minutes.    I have discussed this patient's plan of care and discharge plan at IDT rounds today with Case Management, Nursing, Nursing leadership, and other members of the IDT team.    Disposition  The patient is not medically cleared for discharge to home or a post-acute facility.      I have placed the appropriate orders for post-discharge needs.    Review of Systems  Review of Systems   Unable to perform ROS: Medical condition        Physical Exam  Temp:  [36.3 °C (97.3 °F)-37.3 °C (99.1 °F)] 36.5 °C (97.7 °F)  Pulse:  [] 122  Resp:  [16-22] 17  BP: ()/(55-84) 118/65  SpO2:  [97 %-99 %] 99 %    Physical Exam  Constitutional:       General: She is not in acute distress.     Appearance: She is well-developed. She is ill-appearing, toxic-appearing and diaphoretic.   HENT:      Head: Normocephalic.   Cardiovascular:      Rate and Rhythm: Tachycardia present. Rhythm irregular.      Heart sounds:      No gallop.   Pulmonary:      Effort: No respiratory distress.      Breath sounds: No wheezing.   Abdominal:      General: There is no distension.      Tenderness: There is no abdominal tenderness.   Skin:     General: Skin is warm.      Coloration:  Skin is jaundiced.   Neurological:      Mental Status: She is alert. Mental status is at baseline. She is disoriented.   Psychiatric:      Comments: Unable to assess         Fluids    Intake/Output Summary (Last 24 hours) at 6/4/2024 1845  Last data filed at 6/4/2024 0600  Gross per 24 hour   Intake 300 ml   Output 200 ml   Net 100 ml       Laboratory  Recent Labs     06/03/24  1224 06/04/24  0522   WBC 8.8 8.8   RBC 3.16* 3.08*   HEMOGLOBIN 8.6* 8.1*   HEMATOCRIT 27.4* 26.4*   MCV 86.7 85.7   MCH 27.2 26.3*   MCHC 31.4* 30.7*   RDW 58.9* 58.4*   PLATELETCT 100* 93*   MPV 9.4 9.9     Recent Labs     06/03/24  1759 06/03/24  2207 06/04/24  0522   SODIUM 129* 129* 129*   POTASSIUM 5.1 5.6* 5.3   CHLORIDE 102 102 103   CO2 15* 14* 16*   GLUCOSE 118* 134* 119*   BUN 47* 47* 50*   CREATININE 2.81* 2.77* 2.96*   CALCIUM 7.6* 7.5* 7.5*                   Imaging  DX-CHEST-PORTABLE (1 VIEW)   Final Result      1.  Mild diffuse pulmonary opacity which may be due to edema and/or infiltrates and is mildly increased since prior.   2.  Cardiomegaly.      DX-CHEST-PORTABLE (1 VIEW)   Final Result         1.  Pulmonary edema and/or infiltrates are identified, which appear somewhat increased since the prior exam.   2.  Cardiomegaly      CT-HEAD W/O   Final Result      1.  No evidence of acute intracranial process.      2.  Atrophy.         DX-CHEST-PORTABLE (1 VIEW)   Final Result      Cardiomegaly with interstitial edema.           Assessment/Plan  * Acute encephalopathy- (present on admission)  Assessment & Plan  **CT scan of the head did not show evidence for acute infarction or hemorrhage  Per daughter, patient is lucid and cogent at baseline  Daughter feels patient becomes confused when she has a UTI, collected urine which is pending.  Now more confused  Ammonia ava due to holding lactulose  Resuming rifaximin and lactulose    ELIZABETH (acute kidney injury) (HCC)- (present on admission)  Assessment & Plan  Mostly due to dehydration  secondary to diarrhea and over medication with diuretics    Metabolic acidosis- (present on admission)  Assessment & Plan  Likely due to reduced intravascular volume and increase bicarb losses through diarrhea and acute kidney injury  Initially given IV fluids and then these were discontinued after developing volume overload    Diarrhea- (present on admission)  Assessment & Plan  Differentials include overmedication with lactulose, viral infection, C. difficile colitis  [has been on antibiotics for UTI]  C. difficile initially ordered and pending but unlikely to be this given that patient has not had a bowel movement for a day, so discontinued  Supportive care with intravenous fluids monitor electrolytes and replace accordingly     Dehydration- (present on admission)  Assessment & Plan  Secondary to diarrhea, she is on lactulose  Initially held home diuretics    Hyponatremia- (present on admission)  Assessment & Plan  Likely related to cirrhosis  Avoid rapid or aggressive correction    Anemia- (present on admission)  Assessment & Plan  Appears to be chronic  No evidence of gross bleeding.    ACP (advance care planning)- (present on admission)  Assessment & Plan   At this point the patient and patient daughter wants to maintain a full code.  They are open to all forms of invasive or noninvasive diagnostic and therapeutic interventions.      Type 2 diabetes mellitus with diabetic neuropathy, unspecified (HCC)- (present on admission)  Assessment & Plan  With no hyperglycemia  Hold metformin while inpatient   ISS    GERD (gastroesophageal reflux disease)- (present on admission)  Assessment & Plan  I will start omeprazole    Hyperlipidemia- (present on admission)  Assessment & Plan  Cardiac diet when able to take orally.  I will start atorvastatin    Paroxysmal atrial fibrillation- (present on admission)  Assessment & Plan  Resume apixaban for stroke prophylaxis         VTE prophylaxis: apixiban    I have performed a  physical exam and reviewed and updated ROS and Plan today (6/4/2024). In review of yesterday's note (6/3/2024), there are no changes except as documented above.

## 2024-06-05 NOTE — PROGRESS NOTES
Charge RN and RN preparing to give pt lactulose enema due to NPO status, rolled pt to L side when she started complaining of new radiating chest pain. Rapid called, MD notified, vitals taken immediately, code cart pulled to room.

## 2024-06-05 NOTE — THERAPY
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    Assessment  Patient is 64 y.o. female admitted from [facility] for afib RVR, acute encephalopathy, pulmonary edema, ELIZABETH, metabolic acidosis, diarrhea, dehydration, hyponatremia, and anemia. Pt very confused throughout session req max v/cs for safety and attention. Info obtained from chart d/t poor cognition/confusion; therapist is familiar with this pt. Lives with dtr who assists PRN;  can also assist intermittently. Pt reports has a prosthetic LLE, but unclear if accurate. Currently limited by decreased functional mobility, activity tolerance, cognition, strength, AROM, coordination, balance, and pain which are currently affecting pt's ability to complete ADLs/IADLs at baseline. Will continue to follow.     Plan    Occupational Therapy Initial Treatment Plan   Treatment Interventions: Self Care / Activities of Daily Living, Adaptive Equipment, Neuro Re-Education / Balance, Therapeutic Exercises, Therapeutic Activity, Family / Caregiver Training  Treatment Frequency: 3 Times per Week  Duration: Until Therapy Goals Met    DC Equipment Recommendations: Unable to determine at this time  Discharge Recommendations: Recommend post-acute placement for additional occupational therapy services prior to discharge home      Objective     06/05/24 0921   Prior Living Situation   Prior Services Intermittent Physical Support for ADL Per Family   Housing / Facility 1 Story House   Steps Into Home 2   Bathroom Set up Walk In Shower;Shower Chair   Equipment Owned Front-Wheel Walker;4-Wheel Walker;Single Point Cane;Wheelchair;Tub / Shower Seat   Lives with - Patient's Self Care Capacity Adult Children;Spouse   Comments Info taken  from chart d/t poor cognition/confusion. Lives with dtr who assists PRN;  can also assist intermittently. Pt reports has a prosthetic LLE, but unclear if accurate.   Prior Level of ADL Function   Self Feeding Independent   Grooming / Hygiene Independent   Bathing Requires Assist   Dressing Independent   Toileting Independent   Prior Level of IADL Function   Medication Management Requires Assist   Laundry Requires Assist   Kitchen Mobility Requires Assist   Finances Requires Assist   Home Management Requires Assist   Shopping Requires Assist   Prior Level Of Mobility Uses Wheel Chair for in Home Mobility   Driving / Transportation Relatives / Others Provide Transportation   Precautions   Precautions Fall Risk;Swallow Precautions   Comments L AKA; reports has prosthetic LLE, but unclear if accurate.   Vitals   Pulse (!) 141   Blood Pressure 108/75   O2 (LPM) 2   O2 Delivery Device Silicone Nasal Cannula   Vitals Comments desaturation when flat to boost in bed; recovered quickly once HOB elevated. HR elevated sitting EOB   Pain 0 - 10 Group   Therapist Pain Assessment Post Activity Pain Same as Prior to Activity;During Activity;Nurse Notified  (no c/o pain)   Cognition    Cognition / Consciousness X   Speech/ Communication Delayed Responses   Level of Consciousness Alert   Ability To Follow Commands 1 Step  (~50% of the time)   Safety Awareness Impaired   New Learning Impaired   Attention Impaired   Sequencing Impaired   Initiation Impaired   Comments pt very confused, tangential req redirection, saying off topic things about random people who aren't present; unclear if hallucinations. poor historian, problem solving and insight into deficits   Passive ROM Upper Body   Passive ROM Upper Body WDL   Active ROM Upper Body   Active ROM Upper Body  X   Comments B shoulders limited at baseline   Strength Upper Body   Upper Body Strength  X   Gross Strength Generalized Weakness, Equal Bilaterally.    Coordination Upper  Body   Coordination X   Comments FM and GM impaired   Balance Assessment   Sitting Balance (Static) Fair -   Sitting Balance (Dynamic) Poor +   Weight Shift Sitting Poor   Comments deferred standing for safety; multiple slight LOBs w/recurrent jerking corrections to maintain midline   Bed Mobility    Supine to Sit Maximal Assist   Sit to Supine Maximal Assist   Scooting Maximal Assist   Rolling Maximal Assist to Rt.   Comments HOB elevated   ADL Assessment   Eating Moderate Assist  (unable to use spoon for ice chip; dropped on self)   Grooming Minimal Assist;Seated   Lower Body Dressing Maximal Assist   Toileting Maximal Assist  (purewick and bedpan)   Functional Mobility   Mobility EOB only   Visual Perception   Comments unable to test d/t cognition   Edema / Skin Assessment   Edema / Skin  Not Assessed   Activity Tolerance   Comments limited by fatigue, cognition, and balance   Patient / Family Goals   Patient / Family Goal #1 to go home   Short Term Goals   Short Term Goal # 1 LB dressing wtih min A   Short Term Goal # 2 BSC txf with min A   Short Term Goal # 3 seated full g/h routine w/SPV   Education Group   Education Provided Role of Occupational Therapist;Pathology of bedrest   Role of Occupational Therapist Patient Response Patient;Acceptance;Explanation;Reinforcement Needed;No Learning Evidence   Pathology of Bedrest Patient Response Patient;Acceptance;Explanation;Reinforcement Needed;No Learning Evidence   Occupational Therapy Initial Treatment Plan    Treatment Interventions Self Care / Activities of Daily Living;Adaptive Equipment;Neuro Re-Education / Balance;Therapeutic Exercises;Therapeutic Activity;Family / Caregiver Training   Treatment Frequency 3 Times per Week   Duration Until Therapy Goals Met   Problem List   Problem List Decreased Homemaking Skills;Decreased Active Daily Living Skills;Decreased Upper Extremity Strength Right;Decreased Upper Extremity Strength Left;Decreased Upper Extremity AROM  Right;Decreased Upper Extremity AROM Left;Decreased Functional Mobility;Decreased Activity Tolerance;Safety Awareness Deficits / Cognition;Impaired Posture / Trunk Alignment;Impaired Coordination Right Upper Extremity;Impaired Coordination Left Upper Extremity;Impaired Cognitive Function;Impaired Postural Control / Balance

## 2024-06-05 NOTE — RESPIRATORY CARE
COPD EDUCATION by COPD CLINICAL EDUCATOR  6/5/2024 at 8:30 AM by Diane Garcia RRT     Patient reviewed by COPD education team. Patient does not have a history or diagnosis of COPD and is a non-smoker.  Therefore, patient does not qualify for the COPD program.

## 2024-06-05 NOTE — THERAPY
"Speech Language Pathology   Flexible Endoscopic Evaluation of Swallowing (FEES)        Patient Name: April Alicia  AGE:  64 y.o., SEX:  female  Medical Record #: [id number]  Date of Service: 6/5/2024      History of Present Illness  65 y/o female admitted 6/3 with generalized weakness, diarrhea, and palpitations. Found to have afib with RVR and acute encephalopathy.    CMHx: Acute encephalopathy, afib, GERD, cystitis, DM2, hyponatremia, diarrhea, ELIZABETH, dehydration  PMHx: Alcoholic cirrhosis, diabetic neuropathy    CXR 6/3:  \". Pulmonary edema and/or infiltrates are identified, which appear somewhat increased since the prior exam.  2.  Cardiomegaly\"      Pertinent Information  Current Method of Nutrition: NPO until cleared by speech pathology  Patient Behaviors: Confused, Fatigue   Dentition: Edentulous, Full dentures (Dentures at bedside but not placed this date as patient fatigue increased throughout study)   Feeding Tube: None   Tracheostomy: No   Factor(s) Affecting Performance: Impaired endurance, Impaired mental status     Discussed the risks, benefits, and alternatives of the FEES procedure. Patient/family acknowledged and agreed to proceed.      Assessment  Flexible Endoscopic Evaluation of Swallowing (FEES) completed at bedside today. The endoscope was passed transnasally via Right nare to evaluate the anatomy and physiology of swallowing. Pt tolerated the procedure with no apparent distress.    Anatomical Findings: R arytenoid larger than L  Vocal Fold Motion: Pt did not follow commands to formally assess. Bilateral movement observed with greater R arytenoid movement than L  Secretion Management: Patient appeared to have epistaxis prior to beginning of FEES. Upon passing the scope into the hypopharynx, patient was noted to have thick, bloody secretions along the PPW. Secretions also noted in the laryngeal vestibule and pyriform sinuses ([place] Secretion Scale: 7/7)  PO Trials: Ice Chips, Thin " Liquid, Mildly Thick Liquid, Liquidised, Pudding, Mixed, Minced and Moist      Consistency PAS Score Timing Residue Comments   Thin Liquid 6 During swallow, Post swallow Vallecular Residue: Mild (5%-25%)  Pyriform Sinus Residue: Mild (5%-25%) Tsp, cup - PAS 1  Straw - PAS 6, PAS 1   Mildly Thick 1 N/A Vallecular Residue: Mild (5%-25%)  Pyriform Sinus Residue: Mild (5%-25%) Cup, straw   Liquidised 1 N/A Vallecular Residue: Mild (5%-25%)  Pyriform Sinus Residue: Mild (5%-25%)    Pudding 1 N/A Vallecular Residue: Moderate (25%-50%)  Pyriform Sinus Residue: Mild (5%-25%)    Minced and Moist 1 N/A Vallecular Residue: Mild (5%-25%)  Pyriform Sinus Residue: Mild (5%-25%) Crumbled juanita cracker in puree   Mixed 1 N/A Vallecular Residue: Trace (1%-5%)  Pyriform Sinus Residue: Trace (1%-5%) SB6 peaches - expectorated all  TN0 peaches - PAS 1     Penetration-Aspiration Scale (PAS)  1     No contrast enters airway  2     Contrast enters the airway, remains above the vocal folds, and is ejected from the airway (not seen in the airway at the end of the swallow).  3     Contrast enters the airway, remains above the vocal folds, and is not ejected from the airway (is seen in the airway after the swallow).  4     Contrast enters the airway, contacts the vocal folds, and is ejected from the airway.  5     Contrast enters the airway, contacts the vocal folds, and is not ejected from the airway  6     Contrast enters the airway, crosses the plane of the vocal folds, and is ejected from the airway.  7     Contrast enters the airway, crosses the plane of the vocal folds, and is not ejected from the airway despite effort.  8     Contrast enters the airway, crosses the plane of the vocal folds, is not ejected from the airway and there is no response to aspiration.      Oral phase:  Patient with inconsistent oral bolus stripping and command following for coordination of cup and straw sips. Able to achieve straw suction but at times could  do so immediately and other times suction was laborious and prolonged. Intermittent anterior loss of bolus to the R with cup sips of TN0 and juice from tsp mixed. Patient expectorated full, un-masticated piece of peaches from trial of mixed and expectorated partial boluses of MM5 (crumbled juanita cracker in puree). Trace diffuse lingual stasis with PU4 and MM5. Suspect piecemeal deglutition with PU4. Do suspect component of fatigue; oral phase deficits worsened throughout the study.      Pharyngeal phase:  Pharyngeal phase characterized by impairments in BOT retraction, laryngeal vestibule closure (LVC), pharyngeal shortening, and pharyngeal constriction, which resulted in the following:  - Flash aspiration of TN0 during the swallow (inferred) in single instance. Aspirate did clear from the airway using spontaneous cough  - Mild to moderate vallecular residue with liquids and semi-solids, greatest with PU4 that inconsistently cleared spontaneously  -Trace to mild pyriform sinus residue throughout that generally clear spontaneously  - Mild PPW residue with PU4 that generally clear spontaneously    Of note - UES opened multiple times in between pharyngeal swallows. No observed retrograde flow.      Compensatory Strategies:  Cough - EFFECTIVE to clear aspirate  Liquid wash - EFFECTIVE to reduce pharyngeal stasis  Multiple swallows - EFFECTIVE to reduce pharyngeal stasis      Severity Rating:  KENNETH: Mild      Clinical Impressions  The pt presents with a mild oropharyngeal dysphagia, likely acute on chronic related to encephalopathy in the setting of chronic GERD. Swallow safety and efficiency are both mildly impaired. Would recommend a puree / thin liquid diet at this time with use of 1:1 spv. Pt will likely not need repeat diagnostic evaluation prior to upgrading diet; suspect that, with improved alertness and participation, she should advance quickly. Patient is a good candidate for behavioral swallow rehabilitation.  "Outcomes for pulmonary hygiene should be maximized with use of mobility as pt is able and frequent, thorough oral care.      Recommendations  Puree solids with thin liquids  Medication: Whole with puree, Crush with pudding/puree, as appropriate  Supervision: Careful 1:1 hand feeding, Encourage self-feeding  Positioning: Fully upright and midline during oral intake, Remain upright for 30-45 minutes after oral intake, Meals sitting upright in a chair, as tolerated  Strategies: Small bites/sips, Slow rate of intake, Reduce environmental distractions, Multiple swallows (2) per bite/sips, Periodic cough okay  Oral Care: Q6h  Additional Instrumentation: None  Consult Referral(s): Dietician      SLP Treatment Plan  Treatment Plan: Dysphagia Treatment, Patient/Family/Caregiver Training  SLP Frequency: 4x Per Week  Estimated Duration: Until Therapy Goals Met      Anticipated Discharge Needs  Discharge Recommendations: Recommend post-acute placement for additional speech therapy services prior to discharge home   Therapy Recommendations Upon DC: Dysphagia Training, Patient / Family / Caregiver Education, Community Re-Integration       Patient / Family Goals  Patient / Family Goal #1: \"give me my food.\"  Goal #1 Outcome: Progressing as expected  Short Term Goal # 1: Pt will follow simple directives during 1-1 feeding with min cues for redirection and attention.  Goal Outcome # 1: Goal met, new goal added  Short Term Goal # 1 B : Pt will complete FEES w SLP to further evaluate swallow function and inform POC.  Goal Outcome  # 1 B: Goal met  Short Term Goal # 2: Pt will consume single ice chips with staff without s/s of difficulty.  Goal Outcome # 2 : Goal met, new goal aded  Short Term Goal # 2 B : Pt will consume diet of PU/TN given 1:1 spv with no worsening of respiratory status.  Short Term Goal # 3: Pt will consume trials of advanced solids with no expectoration and timely AP transfer of bolus to demonstrate readiness for " upgrade.      Deborah Rodriges, SLP

## 2024-06-05 NOTE — PROGRESS NOTES
Received report from DANYEL Jack. Assessment completed. Patient A&O x1. Patient is on 2L, Spo2 >90%, no signs of increase work of breathing. Patient reporting 0/10 pain. Bed is locked and in lowest position. Fall and skin precautions in place. Call light within reach.

## 2024-06-05 NOTE — PROGRESS NOTES
4 Eyes Skin Assessment Completed by DANYEL Jack and TRUDY Hui.    Head WDL  Ears WDL  Nose WDL  Mouth Redness, dry, missing teeth  Neck WDL  Breast/Chest WDL  Shoulder Blades WDL  Spine WDL  (R) Arm/Elbow/Hand Redness, Blanching, and Bruising          (L) Arm/Elbow/Hand Redness, Blanching, and Bruising    Abdomen Redness and Blanching, peeling skin          Groin Redness, Blanching, and Excoriation, peeling skin          Scrotum/Coccyx/Buttocks Redness, Blanching, and Excoriation    (R) Leg Redness, Blanching, Scar, Scab, Bruising, and Swelling, healed skin graft        (L) Leg Redness, Blanching, Scar, Scab, Bruising, and Swelling, AKA    (R) Heel/Foot/Toe Redness, Blanching, Discoloration, Boggy, and Swelling    (L) Heel/Foot/Toe AKA          Devices In Places Tele Box, Blood Pressure Cuff, Pulse Ox, and Nasal Cannula      Interventions In Place Gray Ear Foams, NC W/Ear Foams, InterDry, Heel Mepilex, TAP System, Elbow Mepilex, Barrier Cream, Heels Loaded W/Pillows, and Pressure Redistribution Mattress    Possible Skin Injury Yes    Pictures Uploaded Into Epic Yes  Wound Consult Placed Yes  RN Wound Prevention Protocol Ordered Yes

## 2024-06-05 NOTE — PROGRESS NOTES
Monitor Summary:  Rhythm: Afib Rate: 100-120  Ectopies: rare PVC, up to 160, rare couplet  Measurement: ./.08/.28

## 2024-06-05 NOTE — CARE PLAN
The patient is Stable - Low risk of patient condition declining or worsening    Shift Goals  Clinical Goals: pt remain safe and improve mentation, speech eval  Patient Goals: to eat  Family Goals: gisele    Progress made toward(s) clinical / shift goals:    Problem: Skin Integrity  Goal: Skin integrity is maintained or improved  Outcome: Progressing  Note: Pt will be turned q2 hours to prevent skin breakdown.     Problem: Fall Risk  Goal: Patient will remain free from falls  Outcome: Progressing  Note: Fall precautions in place. Pt will remain free of falls.       Patient is not progressing towards the following goals:

## 2024-06-05 NOTE — PROGRESS NOTES
Hospital Medicine Daily Progress Note    Date of Service  6/5/2024    Chief Complaint  April Alicia is a 64 y.o. female admitted 6/3/2024 with diarrhea generalized weakness    Hospital Course  64 y.o. female with a past medical history of elevated BMI of 26, diabetes, atrial fibrillation on anticoagulation, recent diagnosis of urinary tract infection who presented 6/3/2024 with generalized weakness diarrhea and palpitations.  Patient has not been feeling well over the past 1 day.  She has been progressively worsening confusion generalized weakness after a few episodes of diarrhea.  She was found to have atrial fibrillation with rapid ventricular response.     Interval Problem Update  6/4: Ordered SLP but patient failed.  Due to mental status ammonia ava to 71.  Made n.p.o. and may need to try a lactulose enema.  Chest x-ray showed pulmonary edema so DC'd IV fluid and giving a dose of Lasix.  Daughter feels patient becomes confused when she has a UTI, collected urine which is pending.  Chest pain rapid response, developed A-fib RVR after metoprolol was held so transferring to telemetry for IV metoprolol.  Confirmed full code per daughter after discussion on phone and per recent POLST.    6/5 patient tachycardic on 2 L nasal cannula  Telemetry reviewed patient continues to be in atrial fibrillation  Received 2 pushes of IV metoprolol overnight  In RVR this morning with rates to the 150s, scheduled IV labetalol pushes while patient n.p.o.  Creatinine 2.83, GFR 18, phosphorus 5.5  Ammonia improved 28 this morning.  Restart oral lactulose  Speech performed fees exam this afternoon cleared the patient for puréed diet, will restart oral metoprolol  UA positive started on Ancef.  Urine culture pending  Patient very somnolent and disoriented.  Saying yes and no to questions but not appropriately.  Only oriented to self    I have discussed this patient's plan of care and discharge plan at IDT rounds today with  Case Management, Nursing, Nursing leadership, and other members of the IDT team.    Disposition  The patient is not medically cleared for discharge to home or a post-acute facility.  Anticipate discharge to: skilled nursing facility    I have placed the appropriate orders for post-discharge needs.    Review of Systems  Review of Systems   Unable to perform ROS: Medical condition        Physical Exam  Temp:  [36.1 °C (97 °F)-36.8 °C (98.2 °F)] 36.8 °C (98.2 °F)  Pulse:  [] 144  Resp:  [17-22] 18  BP: ()/(55-88) 95/72  SpO2:  [94 %-100 %] 94 %    Physical Exam  Constitutional:       General: She is not in acute distress.     Appearance: She is well-developed. She is ill-appearing.   HENT:      Head: Normocephalic.   Eyes:      Conjunctiva/sclera: Conjunctivae normal.   Cardiovascular:      Rate and Rhythm: Tachycardia present. Rhythm irregular.   Pulmonary:      Effort: Pulmonary effort is normal. No respiratory distress.      Breath sounds: No wheezing.   Abdominal:      General: There is no distension.      Tenderness: There is no abdominal tenderness.   Skin:     General: Skin is warm.      Coloration: Skin is jaundiced.   Neurological:      Mental Status: She is lethargic and disoriented.      Comments: Oriented to self    Psychiatric:         Behavior: Behavior is uncooperative.      Comments: Unable to assess         Fluids    Intake/Output Summary (Last 24 hours) at 6/5/2024 1635  Last data filed at 6/5/2024 1400  Gross per 24 hour   Intake --   Output 200 ml   Net -200 ml         Laboratory  Recent Labs     06/03/24  1224 06/04/24  0522 06/05/24  0005   WBC 8.8 8.8 8.8   RBC 3.16* 3.08* 3.43*   HEMOGLOBIN 8.6* 8.1* 9.1*   HEMATOCRIT 27.4* 26.4* 29.1*   MCV 86.7 85.7 84.8   MCH 27.2 26.3* 26.5*   MCHC 31.4* 30.7* 31.3*   RDW 58.9* 58.4* 57.9*   PLATELETCT 100* 93* 109*   MPV 9.4 9.9 10.4     Recent Labs     06/03/24  2207 06/04/24  0522 06/05/24  0005   SODIUM 129* 129* 131*   POTASSIUM 5.6* 5.3 5.2    CHLORIDE 102 103 101   CO2 14* 16* 18*   GLUCOSE 134* 119* 104*   BUN 47* 50* 55*   CREATININE 2.77* 2.96* 2.83*   CALCIUM 7.5* 7.5* 8.0*                   Imaging  DX-CHEST-PORTABLE (1 VIEW)   Final Result      1.  Mild diffuse pulmonary opacity which may be due to edema and/or infiltrates and is mildly increased since prior.   2.  Cardiomegaly.      DX-CHEST-PORTABLE (1 VIEW)   Final Result         1.  Pulmonary edema and/or infiltrates are identified, which appear somewhat increased since the prior exam.   2.  Cardiomegaly      CT-HEAD W/O   Final Result      1.  No evidence of acute intracranial process.      2.  Atrophy.         DX-CHEST-PORTABLE (1 VIEW)   Final Result      Cardiomegaly with interstitial edema.           Assessment/Plan  * Acute encephalopathy- (present on admission)  Assessment & Plan  **CT scan of the head did not show evidence for acute infarction or hemorrhage  Per daughter, patient is lucid and cogent at baseline  Daughter feels patient becomes confused when she has a UTI, collected urine which is pending.  UA +, started on rocephin   Now more confused  Ammonia ava due to holding lactulose  Resuming rifaximin and lactulose    ELIZABETH (acute kidney injury) (HCC)- (present on admission)  Assessment & Plan  Mostly due to dehydration secondary to diarrhea and over medication with diuretics    Metabolic acidosis- (present on admission)  Assessment & Plan  Likely due to reduced intravascular volume and increase bicarb losses through diarrhea and acute kidney injury  Initially given IV fluids and then these were discontinued after developing volume overload    Diarrhea- (present on admission)  Assessment & Plan  Differentials include overmedication with lactulose, viral infection, C. difficile colitis  [has been on antibiotics for UTI]  C. difficile initially ordered and pending but unlikely to be this given that patient has not had a bowel movement for a day, so discontinued  Supportive care  with intravenous fluids monitor electrolytes and replace accordingly     Dehydration- (present on admission)  Assessment & Plan  Secondary to diarrhea, she is on lactulose  Initially held home diuretics    Hyponatremia- (present on admission)  Assessment & Plan  Likely related to cirrhosis  Avoid rapid or aggressive correction    Anemia- (present on admission)  Assessment & Plan  Appears to be chronic  No evidence of gross bleeding.    ACP (advance care planning)- (present on admission)  Assessment & Plan   At this point the patient and patient daughter wants to maintain a full code.  They are open to all forms of invasive or noninvasive diagnostic and therapeutic interventions.      Type 2 diabetes mellitus with diabetic neuropathy, unspecified (HCC)- (present on admission)  Assessment & Plan  With no hyperglycemia  Hold metformin while inpatient   ISS    GERD (gastroesophageal reflux disease)- (present on admission)  Assessment & Plan  I will start omeprazole    Acute cystitis without hematuria- (present on admission)  Assessment & Plan  UA +  Urine culture pending   Ancef     Hyperlipidemia- (present on admission)  Assessment & Plan  Cardiac diet when able to take orally.  I will start atorvastatin    Paroxysmal atrial fibrillation- (present on admission)  Assessment & Plan  Resume apixaban for stroke prophylaxis  Resume oral metoprolol  As needed Lopressor pushes      Total time spent in chart review, at bedside with the patient, discussing with nursing and case management: 56 minutes    VTE prophylaxis: apixiban    I have performed a physical exam and reviewed and updated ROS and Plan today (6/5/2024). In review of yesterday's note (6/4/2024), there are no changes except as documented above.

## 2024-06-06 LAB
ALBUMIN SERPL BCP-MCNC: 2.3 G/DL (ref 3.2–4.9)
ANION GAP SERPL CALC-SCNC: 12 MMOL/L (ref 7–16)
BASE EXCESS BLDA CALC-SCNC: -10 MMOL/L (ref -4–3)
BODY TEMPERATURE: 36.7 CENTIGRADE
BUN SERPL-MCNC: 60 MG/DL (ref 8–22)
CALCIUM ALBUM COR SERPL-MCNC: 9 MG/DL (ref 8.5–10.5)
CALCIUM SERPL-MCNC: 7.6 MG/DL (ref 8.5–10.5)
CHLORIDE SERPL-SCNC: 102 MMOL/L (ref 96–112)
CO2 SERPL-SCNC: 16 MMOL/L (ref 20–33)
CREAT SERPL-MCNC: 3.02 MG/DL (ref 0.5–1.4)
CREAT UR-MCNC: 220.08 MG/DL
ERYTHROCYTE [DISTWIDTH] IN BLOOD BY AUTOMATED COUNT: 56.6 FL (ref 35.9–50)
EXTRA TUBE BLU BLU: NORMAL
GFR SERPLBLD CREATININE-BSD FMLA CKD-EPI: 17 ML/MIN/1.73 M 2
GLUCOSE BLD STRIP.AUTO-MCNC: 127 MG/DL (ref 65–99)
GLUCOSE BLD STRIP.AUTO-MCNC: 132 MG/DL (ref 65–99)
GLUCOSE BLD STRIP.AUTO-MCNC: 132 MG/DL (ref 65–99)
GLUCOSE BLD STRIP.AUTO-MCNC: 133 MG/DL (ref 65–99)
GLUCOSE BLD STRIP.AUTO-MCNC: 135 MG/DL (ref 65–99)
GLUCOSE BLD STRIP.AUTO-MCNC: 149 MG/DL (ref 65–99)
GLUCOSE BLD STRIP.AUTO-MCNC: 166 MG/DL (ref 65–99)
GLUCOSE SERPL-MCNC: 126 MG/DL (ref 65–99)
HCO3 BLDA-SCNC: 16 MMOL/L (ref 17–25)
HCT VFR BLD AUTO: 26.1 % (ref 37–47)
HGB BLD-MCNC: 8.2 G/DL (ref 12–16)
INHALED O2 FLOW RATE: 2 L/MIN
MCH RBC QN AUTO: 26 PG (ref 27–33)
MCHC RBC AUTO-ENTMCNC: 31.4 G/DL (ref 32.2–35.5)
MCV RBC AUTO: 82.9 FL (ref 81.4–97.8)
PCO2 BLDA: 31.2 MMHG (ref 26–37)
PCO2 TEMP ADJ BLDA: 30.8 MMHG (ref 26–37)
PH BLDA: 7.31 [PH] (ref 7.4–7.5)
PH TEMP ADJ BLDA: 7.32 [PH] (ref 7.4–7.5)
PHOSPHATE SERPL-MCNC: 6.1 MG/DL (ref 2.5–4.5)
PLATELET # BLD AUTO: 98 K/UL (ref 164–446)
PLATELETS.RETICULATED NFR BLD AUTO: 0.8 % (ref 0.6–13.1)
PMV BLD AUTO: 9.1 FL (ref 9–12.9)
PO2 BLDA: 54.6 MMHG (ref 64–87)
PO2 TEMP ADJ BLDA: 53.5 MMHG (ref 64–87)
POTASSIUM SERPL-SCNC: 5.5 MMOL/L (ref 3.6–5.5)
POTASSIUM SERPL-SCNC: 5.7 MMOL/L (ref 3.6–5.5)
PROT UR-MCNC: 83 MG/DL (ref 0–15)
PROT/CREAT UR: 377 MG/G (ref 10–107)
RBC # BLD AUTO: 3.15 M/UL (ref 4.2–5.4)
SAO2 % BLDA: 83.2 % (ref 93–99)
SODIUM SERPL-SCNC: 130 MMOL/L (ref 135–145)
SODIUM UR-SCNC: 28 MMOL/L
WBC # BLD AUTO: 7.5 K/UL (ref 4.8–10.8)

## 2024-06-06 PROCEDURE — 36415 COLL VENOUS BLD VENIPUNCTURE: CPT

## 2024-06-06 PROCEDURE — 84132 ASSAY OF SERUM POTASSIUM: CPT

## 2024-06-06 PROCEDURE — 82803 BLOOD GASES ANY COMBINATION: CPT

## 2024-06-06 PROCEDURE — 700102 HCHG RX REV CODE 250 W/ 637 OVERRIDE(OP)

## 2024-06-06 PROCEDURE — 700105 HCHG RX REV CODE 258

## 2024-06-06 PROCEDURE — 82962 GLUCOSE BLOOD TEST: CPT | Mod: 91

## 2024-06-06 PROCEDURE — A9270 NON-COVERED ITEM OR SERVICE: HCPCS | Performed by: HOSPITALIST

## 2024-06-06 PROCEDURE — 700111 HCHG RX REV CODE 636 W/ 250 OVERRIDE (IP): Mod: JZ | Performed by: INTERNAL MEDICINE

## 2024-06-06 PROCEDURE — 700102 HCHG RX REV CODE 250 W/ 637 OVERRIDE(OP): Performed by: HOSPITALIST

## 2024-06-06 PROCEDURE — 80069 RENAL FUNCTION PANEL: CPT

## 2024-06-06 PROCEDURE — 99233 SBSQ HOSP IP/OBS HIGH 50: CPT | Performed by: INTERNAL MEDICINE

## 2024-06-06 PROCEDURE — A9270 NON-COVERED ITEM OR SERVICE: HCPCS | Performed by: INTERNAL MEDICINE

## 2024-06-06 PROCEDURE — P9047 ALBUMIN (HUMAN), 25%, 50ML: HCPCS | Mod: JZ | Performed by: INTERNAL MEDICINE

## 2024-06-06 PROCEDURE — 85055 RETICULATED PLATELET ASSAY: CPT

## 2024-06-06 PROCEDURE — 97602 WOUND(S) CARE NON-SELECTIVE: CPT

## 2024-06-06 PROCEDURE — 85027 COMPLETE CBC AUTOMATED: CPT

## 2024-06-06 PROCEDURE — 84156 ASSAY OF PROTEIN URINE: CPT

## 2024-06-06 PROCEDURE — 700101 HCHG RX REV CODE 250: Performed by: HOSPITALIST

## 2024-06-06 PROCEDURE — 84300 ASSAY OF URINE SODIUM: CPT

## 2024-06-06 PROCEDURE — 82570 ASSAY OF URINE CREATININE: CPT

## 2024-06-06 PROCEDURE — 700102 HCHG RX REV CODE 250 W/ 637 OVERRIDE(OP): Performed by: INTERNAL MEDICINE

## 2024-06-06 PROCEDURE — 770020 HCHG ROOM/CARE - TELE (206)

## 2024-06-06 RX ORDER — ALBUMIN (HUMAN) 12.5 G/50ML
75 SOLUTION INTRAVENOUS ONCE
Status: COMPLETED | OUTPATIENT
Start: 2024-06-06 | End: 2024-06-06

## 2024-06-06 RX ORDER — METOPROLOL TARTRATE 1 MG/ML
5 INJECTION, SOLUTION INTRAVENOUS
Status: COMPLETED | OUTPATIENT
Start: 2024-06-06 | End: 2024-06-07

## 2024-06-06 RX ADMIN — SODIUM ZIRCONIUM CYCLOSILICATE 10 G: 10 POWDER, FOR SUSPENSION ORAL at 13:43

## 2024-06-06 RX ADMIN — METOPROLOL TARTRATE 12.5 MG: 25 TABLET, FILM COATED ORAL at 17:41

## 2024-06-06 RX ADMIN — METOPROLOL TARTRATE 5 MG: 5 INJECTION INTRAVENOUS at 03:21

## 2024-06-06 RX ADMIN — GABAPENTIN 400 MG: 400 CAPSULE ORAL at 05:39

## 2024-06-06 RX ADMIN — LACTULOSE 30 ML: 20 SOLUTION ORAL at 05:39

## 2024-06-06 RX ADMIN — DEXTROSE MONOHYDRATE 25 G: 100 INJECTION, SOLUTION INTRAVENOUS at 05:38

## 2024-06-06 RX ADMIN — CLOTRIMAZOLE AND BETAMETHASONE DIPROPIONATE 1 APPLICATION: 10; .5 CREAM TOPICAL at 05:51

## 2024-06-06 RX ADMIN — GABAPENTIN 400 MG: 400 CAPSULE ORAL at 17:41

## 2024-06-06 RX ADMIN — ATORVASTATIN CALCIUM 20 MG: 20 TABLET, FILM COATED ORAL at 21:46

## 2024-06-06 RX ADMIN — LACTULOSE 30 ML: 20 SOLUTION ORAL at 17:42

## 2024-06-06 RX ADMIN — CEFAZOLIN SODIUM 1 G: 1 INJECTION, SOLUTION INTRAVENOUS at 06:13

## 2024-06-06 RX ADMIN — ALBUMIN (HUMAN) 75 G: 0.25 INJECTION, SOLUTION INTRAVENOUS at 13:51

## 2024-06-06 RX ADMIN — METOPROLOL TARTRATE 12.5 MG: 25 TABLET, FILM COATED ORAL at 05:39

## 2024-06-06 RX ADMIN — OMEPRAZOLE 20 MG: 20 CAPSULE, DELAYED RELEASE ORAL at 05:39

## 2024-06-06 RX ADMIN — RIFAXIMIN 550 MG: 550 TABLET ORAL at 05:39

## 2024-06-06 RX ADMIN — CEFAZOLIN SODIUM 1 G: 1 INJECTION, SOLUTION INTRAVENOUS at 17:43

## 2024-06-06 RX ADMIN — APIXABAN 5 MG: 5 TABLET, FILM COATED ORAL at 17:41

## 2024-06-06 RX ADMIN — INSULIN HUMAN 4.5 UNITS: 100 INJECTION, SOLUTION PARENTERAL at 06:01

## 2024-06-06 RX ADMIN — APIXABAN 5 MG: 5 TABLET, FILM COATED ORAL at 05:51

## 2024-06-06 RX ADMIN — RIFAXIMIN 550 MG: 550 TABLET ORAL at 17:41

## 2024-06-06 ASSESSMENT — FIBROSIS 4 INDEX: FIB4 SCORE: 3.69

## 2024-06-06 ASSESSMENT — PAIN DESCRIPTION - PAIN TYPE: TYPE: ACUTE PAIN

## 2024-06-06 NOTE — PROGRESS NOTES
4 Eyes Skin Assessment Completed by DANYEL Stahl and TRUDY Hui.    Head WDL  Ears Redness and Blanching  Nose WDL  Mouth WDL  Neck WDL  Breast/Chest WDL  Shoulder Blades Redness and Blanching  Spine Redness and Blanching  (R) Arm/Elbow/Hand Redness, Blanching, and Bruising  (L) Arm/Elbow/Hand Redness, Blanching, and Bruising  Abdomen Edema, Redness, Excoriation, Flaky, and Peeling  Groin Edema, Redness and Excoriation  Scrotum/Coccyx/Buttocks Redness and Excoriation, Non-Blanching, Blanching, Partial-Thickness Wound to Sacrum  (R) Leg Edema, Discoloration, Redness, Blanching, Flaky, Scabs, Old Skin Graft/Scarring  (L) Leg Edema, Redness, Blanching, Scabs and AKA Scar  (R) Heel/Foot/Toe Edema, Discoloration, Boggy, Redness, and Blanching  (L) Heel/Foot/Toe N/A (AKA)                  Devices In Places Tele Box, BP Cuff, Pulse Ox, and Nasal Cannula      Interventions In Place Gray Ear Foams, NC W/Ear Foams, Heel Mepilex, TAP System, Pillows, Elbow Mepilex, Q2 Turns, Dri-Shalom Pads, Heels Loaded W/Pillows, and Pressure Redistribution Mattress    KIMMY Machine Ordered    Possible Skin Injury Yes    Pictures Uploaded Into Epic Yes  Wound Consult Placed Yes  RN Wound Prevention Protocol Ordered Yes

## 2024-06-06 NOTE — PROGRESS NOTES
Telemetry Shift Summary    Rhythm afib  HR Range 103-179  Ectopy PVC, couplet  Measurements -/.08/.29    Normal Values  Rhythm SR  HR Range:   Measurements: 0.12-0.20/0.06-0.10/0.30-0.52

## 2024-06-06 NOTE — PROGRESS NOTES
Hospital Medicine Daily Progress Note    Date of Service  6/6/2024    Chief Complaint  April Alicia is a 64 y.o. female admitted 6/3/2024 with diarrhea generalized weakness    Hospital Course  64 y.o. female with a past medical history of elevated BMI of 26, diabetes, atrial fibrillation on anticoagulation, recent diagnosis of urinary tract infection who presented 6/3/2024 with generalized weakness diarrhea and palpitations.  Patient has not been feeling well over the past 1 day.  She has been progressively worsening confusion generalized weakness after a few episodes of diarrhea.  She was found to have atrial fibrillation with rapid ventricular response.     Interval Problem Update  6/4: Ordered SLP but patient failed.  Due to mental status ammonia ava to 71.  Made n.p.o. and may need to try a lactulose enema.  Chest x-ray showed pulmonary edema so DC'd IV fluid and giving a dose of Lasix.  Daughter feels patient becomes confused when she has a UTI, collected urine which is pending.  Chest pain rapid response, developed A-fib RVR after metoprolol was held so transferring to telemetry for IV metoprolol.  Confirmed full code per daughter after discussion on phone and per recent POLST.    6/5 patient tachycardic on 2 L nasal cannula  Telemetry reviewed patient continues to be in atrial fibrillation  Received 2 pushes of IV metoprolol overnight  In RVR this morning with rates to the 150s, scheduled IV labetalol pushes while patient n.p.o.  Creatinine 2.83, GFR 18, phosphorus 5.5  Ammonia improved 28 this morning.  Restart oral lactulose  Speech performed fees exam this afternoon cleared the patient for puréed diet, will restart oral metoprolol  UA positive started on Ancef.  Urine culture pending  Patient very somnolent and disoriented.  Saying yes and no to questions but not appropriately.  Only oriented to self    6/6 on 2 L NC, BP low normal   Telemetry reviewed patient in afib    Hemoglobin 8.2  plt 98  Cr 3.02, GFR 17. Now with hyperkalemia 5.7. Phos 6.1  -given insulin and D50 this morning, repeat potassium 5.5  Discussed with nephrology, poor candidate for long-term dialysis with her liver disease, started Lokelma.  Recommended palliative care consult  Blood cultures negative.   Urine culture with lactose fermenting gram-negative sneha, speciation pending  Patient still very somnolent, ABG ordered pH 7.31, pCO2 31, pO2 54.  Patient did take her medications this morning.    I have discussed this patient's plan of care and discharge plan at IDT rounds today with Case Management, Nursing, Nursing leadership, and other members of the IDT team.    Consults   Nephrology   Palliative care    Disposition  The patient is not medically cleared for discharge to home or a post-acute facility.  Anticipate discharge to: skilled nursing facility    I have placed the appropriate orders for post-discharge needs.    Review of Systems  Review of Systems   Unable to perform ROS: Medical condition        Physical Exam  Temp:  [36.4 °C (97.5 °F)-36.7 °C (98.1 °F)] 36.7 °C (98.1 °F)  Pulse:  [] 61  Resp:  [18] 18  BP: ()/(49-78) 100/62  SpO2:  [97 %-99 %] 99 %    Physical Exam  Vitals and nursing note reviewed.   Constitutional:       General: She is not in acute distress.     Appearance: She is well-developed. She is ill-appearing.      Comments: Very somnolent, not answering questions during my evaluation. Will open her eyes   HENT:      Head: Normocephalic.   Eyes:      Conjunctiva/sclera: Conjunctivae normal.   Cardiovascular:      Rate and Rhythm: Tachycardia present. Rhythm irregular.   Pulmonary:      Effort: Pulmonary effort is normal. No respiratory distress.      Breath sounds: No wheezing.   Abdominal:      General: There is no distension.      Tenderness: There is no abdominal tenderness.   Skin:     General: Skin is warm.      Coloration: Skin is jaundiced.   Neurological:      Mental Status: She is  disoriented.   Psychiatric:         Behavior: Behavior is uncooperative.      Comments: Unable to assess         Fluids    Intake/Output Summary (Last 24 hours) at 6/6/2024 1655  Last data filed at 6/6/2024 0613  Gross per 24 hour   Intake 840 ml   Output 120 ml   Net 720 ml         Laboratory  Recent Labs     06/04/24 0522 06/05/24  0005 06/06/24  0342   WBC 8.8 8.8 7.5   RBC 3.08* 3.43* 3.15*   HEMOGLOBIN 8.1* 9.1* 8.2*   HEMATOCRIT 26.4* 29.1* 26.1*   MCV 85.7 84.8 82.9   MCH 26.3* 26.5* 26.0*   MCHC 30.7* 31.3* 31.4*   RDW 58.4* 57.9* 56.6*   PLATELETCT 93* 109* 98*   MPV 9.9 10.4 9.1     Recent Labs     06/04/24 0522 06/05/24  0005 06/06/24  0342 06/06/24  1106   SODIUM 129* 131* 130*  --    POTASSIUM 5.3 5.2 5.7* 5.5   CHLORIDE 103 101 102  --    CO2 16* 18* 16*  --    GLUCOSE 119* 104* 126*  --    BUN 50* 55* 60*  --    CREATININE 2.96* 2.83* 3.02*  --    CALCIUM 7.5* 8.0* 7.6*  --                    Imaging  DX-CHEST-PORTABLE (1 VIEW)   Final Result      1.  Mild diffuse pulmonary opacity which may be due to edema and/or infiltrates and is mildly increased since prior.   2.  Cardiomegaly.      DX-CHEST-PORTABLE (1 VIEW)   Final Result         1.  Pulmonary edema and/or infiltrates are identified, which appear somewhat increased since the prior exam.   2.  Cardiomegaly      CT-HEAD W/O   Final Result      1.  No evidence of acute intracranial process.      2.  Atrophy.         DX-CHEST-PORTABLE (1 VIEW)   Final Result      Cardiomegaly with interstitial edema.           Assessment/Plan  * Acute encephalopathy- (present on admission)  Assessment & Plan  **CT scan of the head did not show evidence for acute infarction or hemorrhage  Per daughter, patient is lucid and cogent at baseline  Daughter feels patient becomes confused when she has a UTI, collected urine which is pending.  UA +, started on rocephin   Now more confused  Ammonia ava due to holding lactulose  Resuming rifaximin and lactulose    ELIZABETH  (acute kidney injury) (HCC)- (present on admission)  Assessment & Plan  Mostly due to dehydration secondary to diarrhea and over medication with diuretics    Metabolic acidosis- (present on admission)  Assessment & Plan  Likely due to reduced intravascular volume and increase bicarb losses through diarrhea and acute kidney injury  Initially given IV fluids and then these were discontinued after developing volume overload    Diarrhea- (present on admission)  Assessment & Plan  Differentials include overmedication with lactulose, viral infection, C. difficile colitis  [has been on antibiotics for UTI]  C. difficile initially ordered and pending but unlikely to be this given that patient has not had a bowel movement for a day, so discontinued  Supportive care with intravenous fluids monitor electrolytes and replace accordingly     Dehydration- (present on admission)  Assessment & Plan  Secondary to diarrhea, she is on lactulose  Initially held home diuretics    Hyponatremia- (present on admission)  Assessment & Plan  Likely related to cirrhosis  Avoid rapid or aggressive correction    Anemia- (present on admission)  Assessment & Plan  Appears to be chronic  No evidence of gross bleeding.    ACP (advance care planning)- (present on admission)  Assessment & Plan   At this point the patient and patient daughter wants to maintain a full code.  They are open to all forms of invasive or noninvasive diagnostic and therapeutic interventions.      Type 2 diabetes mellitus with diabetic neuropathy, unspecified (HCC)- (present on admission)  Assessment & Plan  With no hyperglycemia  Hold metformin while inpatient   ISS    GERD (gastroesophageal reflux disease)- (present on admission)  Assessment & Plan  I will start omeprazole    Acute cystitis without hematuria- (present on admission)  Assessment & Plan  UA +  Urine culture- lactose fermenting gram-negative sneha  Ancef     Hyperlipidemia- (present on admission)  Assessment &  Plan  Cardiac diet when able to take orally.  I will start atorvastatin    Paroxysmal atrial fibrillation- (present on admission)  Assessment & Plan  Resume apixaban for stroke prophylaxis  Resume oral metoprolol  As needed Lopressor pushes      Total time spent in chart review, at bedside with the patient, discussing with nursing and case management: 52 minutes    VTE prophylaxis: apixiban    I have performed a physical exam and reviewed and updated ROS and Plan today (6/6/2024). In review of yesterday's note (6/5/2024), there are no changes except as documented above.

## 2024-06-06 NOTE — DOCUMENTATION QUERY
"                                                                         [facility]                                                                       Query Response Note      PATIENT:               FRANCISCA TURNER  ACCT #:                  [id number]  MRN:                     [id number]  :                      1960  ADMIT DATE:       6/3/2024 12:05 PM  DISCH DATE:          RESPONDING  PROVIDER #:        [id number]           QUERY TEXT:    Documentation in the medical record indicates that this patient has been diagnosed as having \"acute kidney injury\".      Can the above elevated lab requiring treatment be further clarified based on the clinical indicators above?    Note: If you agree with a diagnosis listed above, please remember to include it in your concurrent daily documentation and onto the Discharge Summary.    The patient's Clinical Indicators include:  (H&P) \"ELIZABETH - Mostly due to dehydration secondary to diarrhea and over medication with diuretics\"    Labs: Creat : 2.97, : 2.96, : 2.83, : 3.02             BUN : 45, : 50, : 55, : 60             GRF : 17, : 17, : 18, : 17    Intake/Output: : Intake: 300, Output: 200, Daily Net: + 100                              : Intake: 840, Output: 320, Daily Net: + 520    Risk Factors: Acute encephalopathy, Metabolic acidosis, Diarrhea, Dehydration, Hyponatremia, Anemia, Acute cystitis without hematuria, Paroxysmal atrial fibrillation    Tx: intravenous fluids, hold furosemide and spironolactone, hold lactulose, Avoid / minimize nephrotoxins, Monitor inputs and outputs    If you have any questions, please contact:  Mark Stanley RN CDI [facility]  [email or web address]  Mark Stanley [address] [employer]  Options provided:   -- Acute Kidney Injury with Acute Tubular Necrosis   -- Acute Kidney Injury without Acute Tubular Necrosis   -- Other explanation, Please specify   -- Unable to " determine      Query created by: Mark Torres on 6/6/2024 8:38 AM    RESPONSE TEXT:    Acute Kidney Injury with Acute Tubular Necrosis          Electronically signed by:  ODILON DODSON MD 6/6/2024 2:16 PM

## 2024-06-06 NOTE — PROGRESS NOTES
NOC HOSPITALIST CROSS COVER    Notified by RN regarding potassium of 5.7.  Treated with IV insulin and dextrose.      Vitals:    06/06/24 0321   BP: 114/65   Pulse: (!) 137   Resp:    Temp:    SpO2:      -----------------------------------------------------------------------------------------------------------    Electronically signed by:  Barry Montez, JERAD, APRN, HANNY-BC  Hospitalist Services

## 2024-06-06 NOTE — PROGRESS NOTES
4 Eyes Skin Assessment Completed by DANYEL Wang and DANYEL Damon.    Head WDL  Ears Redness and Blanching  Nose WDL  Mouth WDL  Neck WDL  Breast/Chest WDL  Shoulder Blades Redness and Blanching  Spine Redness and Blanching  (R) Arm/Elbow/Hand Redness, Blanching, and Bruising  (L) Arm/Elbow/Hand Redness, Blanching, and Bruising  Abdomen Redness, dry and peeling skin  Groin Redness and Excoriation  Scrotum/Coccyx/Buttocks Redness and Excoriation, slow to quintin  (R) Leg Redness, Blanching, Scar, Scab, Bruising, and Edema, old skin graft  (L) Leg Redness, Scar, Scab, Bruising, and Swelling, L AKA  (R) Heel/Foot/Toe Redness, Blanching, Discoloration, Boggy, and Edema  (L) Heel/Foot/Toe AKA          Devices In Places Tele Box, Pulse Ox, and Nasal Cannula      Interventions In Place Gray Ear Foams, NC W/Ear Foams, Heel Mepilex, TAP System, Pillows, Elbow Mepilex, Q2 Turns, Dri-Shalom Pads, Heels Loaded W/Pillows, and Pressure Redistribution Mattress    Possible Skin Injury Yes    Pictures Uploaded Into Epic Yes  Wound Consult Placed Yes  RN Wound Prevention Protocol Ordered Yes

## 2024-06-06 NOTE — PROGRESS NOTES
Bedside report received from off going RN/tech: Oskar assumed care of patient.     Fall Risk Score: HIGH RISK  Fall risk interventions in place: Provide patient/family education based on risk assessment, Educate patient/family to call staff for assistance when getting out of bed, Place fall precaution signage outside patient door, Place patient in room close to nursing station, Utilize bed/chair fall alarm, and Bed alarm connected correctly  Bed type: Regular (Sincere Score less than 17 interventions in place)  Patient on cardiac monitor: Yes  IVF/IV medications: Not Applicable   Oxygen: How many liters 2L  Bedside sitter: Not Applicable   Isolation: Not applicable

## 2024-06-06 NOTE — CARE PLAN
The patient is Stable - Low risk of patient condition declining or worsening    Shift Goals  Clinical Goals: monitor mental status, monitor HR/rhythm  Patient Goals: EDMUND  Family Goals: N/A    Progress made toward(s) clinical / shift goals:    Problem: Skin Integrity  Goal: Skin integrity is maintained or improved  Description: Target End Date:  Prior to discharge or change in level of care    Document interventions on Skin Risk/Sincere flowsheet groups and corresponding LDA    1.  Assess and monitor skin integrity, appearance and/or temperature  2.  Assess risk factors for impaired skin integrity and/or pressures ulcers  3.  Implement precautions to protect skin integrity in collaboration with interdisciplinary team  4.  Implement pressure ulcer prevention protocol if at risk for skin breakdown  5.  Confirm wound care consult if at risk for skin breakdown  6.  Ensure patient use of pressure relieving devices  (Low air loss bed, waffle overlay, heel protectors, ROHO cushion, etc)  Outcome: Progressing  Note: Appropriate wound, incontinence dermatitis prveention, and pressure injury prevention protocols in place. Pt undergoing Q2 turns w/ wedges. KIMMY ordered. Wound consult placed for pannus, groin, sacrum, BL buttocks, and L thigh.     Problem: Fall Risk  Goal: Patient will remain free from falls  Description: Target End Date:  Prior to discharge or change in level of care    Document interventions on the [facility] Fall Risk Assessment    1.  Assess for fall risk factors  2.  Implement fall precautions  Outcome: Progressing  Note: Pt is a high fall risk. Bed is in lowest, locked position. Call light and belongings within reach. Bed alarm is on. Pt reeducated on how to utilize call light. No learning evidence of education.

## 2024-06-06 NOTE — CONSULTS
[facility] -  CONSULTATION NOTE               Author: Dorian Rojas M.D. Date & Time: 6/6/2024  8:07 AM       REASON FOR CONSULTATION:   Acute Renal Failure    CHIEF COMPLAINT:   Unable to obtain    HISTORY OF PRESENT ILLNESS:    64 y.o. female with a past medical history of diabetes, Cirrhosis, atrial fibrillation, recent bacteremia who presented on 6/3/2024 from SNF with weakness, diarrhea, palpitations found to have Afib with RVR. She is currently encephalopathic, unable to participate in interview as such HP per chart review and 3rd person accounts. Initially started on fluids. Then Found to have altered mentation with elevated ammonia and pulm edema prompting dose of lasix. Prolonged RVR with intermittent documented low Bps. Cr 2.9 (as low as 1.3 on 5/25) on admit without significant improvement thus far. Only 320cc UOP documented last 24 hours, UA on 6/4 with large cellular component (WBC/RBC) and proteinuria, concern for UTI so started on abx. Recent course of abx for strop bacteremia/HCAP. Continued on aldactone. Thus far family has wanted to pursue all measures.    REVIEW OF SYSTEMS:    Unable to obtain     PAST MEDICAL HISTORY:   Past Medical History:   Diagnosis Date    Abnormal finding of lung 12/27/2022    Acute exacerbation of chronic obstructive pulmonary disease (COPD) (HCC) 01/27/2020    Alcoholic cirrhosis (HCC)     Arthritis     OA in knees    ASTHMA     Atrial fibrillation (HCC)     Dry eyes 11/16/2017    Edentulous     upper and lower dentures    Emphysema of lung (HCC)     H/O: hysterectomy 12/05/2019    Heart burn     left knee    Hepatic encephalopathy (HCC) 12/27/2022    Hepatitis C infection 07/28/2022    History of rheumatic fever 09/04/2017    Hypertension     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HCC)     Recurrent, L knee, x4 with surgical washout    Medication overuse headache 08/15/2017    Mitral regurgitation     Pancytopenia (HCC) 07/26/2022    Pneumonia  02/2020    Primary osteoarthritis of left knee 08/25/2020    Prosthetic joint infection ([facility]) 2018    Right knee after total knee    Protein-calorie malnutrition, severe ([facility]) 08/01/2022    Right leg DVT ([facility]) 2018    acute, resolved    Seizure disorder ([facility])     Septic arthritis of knee, left ([facility]) 07/19/2022    Septic arthritis of shoulder, left ([facility]) 07/17/2022    Septic shock due to Pseudomonas species ([facility]) 10/30/2023    Type 2 diabetes mellitus ([facility]) 12/27/2022       PAST SURGICAL HISTORY:      Past Surgical History:   Procedure Laterality Date    KNEE AMPUTATION ABOVE Left 1/16/2024    Procedure: LEFT ABOVE KNEE AMPUTATION;  Surgeon: Obdulio Gray M.D.;  Location: [facility];  Service: Orthopedics    PB TOTAL KNEE ARTHROPLASTY Left 10/30/2023    Procedure: LEFT TOTAL KNEE ARTHROPLASTY EXPLANTATION, INSERTION OF ANTIBIOTIC SPACER, AND APPLICATION OF INCISIONAL WOUND VACUUM;  Surgeon: Wild Luz M.D.;  Location: [facility];  Service: Orthopedics    PB REVISE KNEE JOINT REPLACE,ALL PARTS Left 12/28/2022    Procedure: REVISION, TOTAL ARTHROPLASTY, KNEE, ALL COMPONENTS-LEFT;  Surgeon: Wild Luz M.D.;  Location: [facility];  Service: Orthopedics    IRRIGATION & DEBRIDEMENT GENERAL Left 12/28/2022    Procedure: IRRIGATION AND DEBRIDEMENT, WOUND;  Surgeon: Wild Luz M.D.;  Location: [facility];  Service: Orthopedics    PB REVISE KNEE JOINT REPLACE,ALL PARTS Left 7/19/2022    Procedure: REVISION, TOTAL ARTHROPLASTY, KNEE, ALL COMPONENTS - ANTIBIOTIC SPACER;  Surgeon: Wild Luz M.D.;  Location: [facility];  Service: Orthopedics    IRRIGATION & DEBRIDEMENT GENERAL Left 7/17/2022    Procedure: IRRIGATION AND DEBRIDEMENT, SHOULDER;  Surgeon: Obdulio Gray M.D.;  Location: [facility];  Service: Orthopedics    PB TOTAL KNEE ARTHROPLASTY Left 8/24/2020    Procedure: ARTHROPLASTY, KNEE, TOTAL;  Surgeon: Víctor Faustin M.D.;  Location:  SURGERY [facility];  Service: Orthopedics    KNEE ARTHROPLASTY TOTAL Right 2018    IRRIGATION & DEBRIDEMENT ORTHO Right 9/3/2017    Procedure: IRRIGATION & DEBRIDEMENT ORTHO, POLY EXCHANGE;  Surgeon: Jerome Russell M.D.;  Location: SURGERY [facility];  Service: Orthopedics    COLONOSCOPY WITH CLIPPING  10/28/2015    Procedure: COLONOSCOPY WITH CLIPPING;  Surgeon: Ruben Colon M.D.;  Location: ENDOSCOPY [facility];  Service:     COLONOSCOPY WITH SCLEROTHERAPY  10/28/2015    Procedure: COLONOSCOPY WITH SCLEROTHERAPY;  Surgeon: Ruben Colon M.D.;  Location: ENDOSCOPY [facility];  Service:     COLONOSCOPY WITH TATTOOING  10/28/2015    Procedure: COLONOSCOPY WITH TATTOOING;  Surgeon: Ruben Colon M.D.;  Location: ENDOSCOPY [facility];  Service:     GYN SURGERY  2003    hysteroscoopy    GYN SURGERY  1982    tubal ligation       FAMILY HISTORY:   No family history of renal disease    SOCIAL HISTORY:   No tobacco, +past EtOH, No illicits    HOME MEDICATIONS:   No current facility-administered medications on file prior to encounter.     Current Outpatient Medications on File Prior to Encounter   Medication Sig Dispense Refill    gabapentin (NEURONTIN) 400 MG Cap Take 400 mg by mouth 2 times a day.      losartan (COZAAR) 100 MG Tab Take 100 mg by mouth every morning.      nitrofurantoin (MACROBID) 100 MG Cap Take 100 mg by mouth 2 times a day.      metformin (GLUCOPHAGE) 1000 MG tablet Take 1,000-2,000 mg by mouth 2 times a day with meals. 1 tablet every morning & 2 tablets (2000mg) at bedtime      pantoprazole (PROTONIX) 20 MG tablet Take 20 mg by mouth every morning.      clotrimazole-betamethasone (LOTRISONE) 1-0.05 % Cream Apply 1 Application topically 2 times a day. Apply to right arm rash 2 times daily for 7 days      riFAXIMin (XIFAXAN) 550 MG Tab tablet Take 550 mg by mouth 2 times a day.      nystatin (MYCOSTATIN) powder Apply 1 Application topically 3 times a day. For 10  "days      metoprolol tartrate (LOPRESSOR) 25 MG Tab Take 25 mg by mouth one time. One time only for tachycardia      oxyCODONE immediate-release (ROXICODONE) 5 MG Tab Take 5 mg by mouth every 8 hours as needed for Severe Pain.      lactulose 20 GM/30ML Solution Take 30 mL by mouth 3 times a day. Hold dose if 4 or more soft or loose stools in the last 24 hours, please gradually increase amount per dose by 5mls if you do not have at least 2 stools per day. 450 mL 3    furosemide (LASIX) 40 MG Tab Take 1 Tablet by mouth every day for 30 days. 30 Tablet 0    apixaban (ELIQUIS) 5mg Tab Take 1 Tablet by mouth 2 times a day. Indications: Thromboembolism secondary to Atrial Fibrillation 60 Tablet 0    spironolactone (ALDACTONE) 25 MG Tab Take 1 Tablet by mouth every day for 30 days. (Patient taking differently: Take 25 mg by mouth every morning.) 30 Tablet 0    atorvastatin (LIPITOR) 20 MG Tab Take 1 Tablet by mouth every evening. (Patient taking differently: Take 20 mg by mouth at bedtime.) 90 Tablet 3    acetaminophen (TYLENOL) 325 MG Tab Take 2 Tablets by mouth every 8 hours. (Patient taking differently: Take 650 mg by mouth every 6 hours as needed for Mild Pain or Moderate Pain.) 30 Tablet 0    albuterol 108 (90 Base) MCG/ACT Aero Soln inhalation aerosol Inhale 2 Puffs every 4 hours. Every 4-6 hours prn      ondansetron (ZOFRAN ODT) 4 MG TABLET DISPERSIBLE Take 1 Tablet by mouth every four hours as needed for Nausea/Vomiting (give PO if no IV route available). 10 Tablet 0       ALLERGIES:  Tuberculin tests    PHYSICAL EXAM:  VS:  /62   Pulse 61   Temp 36.7 °C (98.1 °F) (Temporal)   Resp 18   Ht 1.702 m (5' 7\")   Wt 93.3 kg (205 lb 11 oz)   LMP 06/02/2008   SpO2 99%   BMI 32.22 kg/m²   GENERAL: Ill appearing  CV: tachy, +edema  RESP: non-labored with supplemental O2  GI: Soft  MSK: LLE AKA  SKIN: RLE skin graft  NEURO: AOx1  PSYCH: Unable to assess    LABS:  Recent Results (from the past 24 hour(s))   POCT " glucose device results    Collection Time: 06/05/24  8:44 AM   Result Value Ref Range    POC Glucose, Blood 91 65 - 99 mg/dL   POCT glucose device results    Collection Time: 06/05/24 11:27 AM   Result Value Ref Range    POC Glucose, Blood 93 65 - 99 mg/dL   POCT glucose device results    Collection Time: 06/05/24  5:07 PM   Result Value Ref Range    POC Glucose, Blood 104 (H) 65 - 99 mg/dL   POCT glucose device results    Collection Time: 06/05/24  8:40 PM   Result Value Ref Range    POC Glucose, Blood 113 (H) 65 - 99 mg/dL   Renal Function Panel    Collection Time: 06/06/24  3:42 AM   Result Value Ref Range    Sodium 130 (L) 135 - 145 mmol/L    Potassium 5.7 (H) 3.6 - 5.5 mmol/L    Chloride 102 96 - 112 mmol/L    Co2 16 (L) 20 - 33 mmol/L    Glucose 126 (H) 65 - 99 mg/dL    Creatinine 3.02 (H) 0.50 - 1.40 mg/dL    Bun 60 (H) 8 - 22 mg/dL    Calcium 7.6 (L) 8.5 - 10.5 mg/dL    Correct Calcium 9.0 8.5 - 10.5 mg/dL    Phosphorus 6.1 (H) 2.5 - 4.5 mg/dL    Albumin 2.3 (L) 3.2 - 4.9 g/dL   CBC WITHOUT DIFFERENTIAL    Collection Time: 06/06/24  3:42 AM   Result Value Ref Range    WBC 7.5 4.8 - 10.8 K/uL    RBC 3.15 (L) 4.20 - 5.40 M/uL    Hemoglobin 8.2 (L) 12.0 - 16.0 g/dL    Hematocrit 26.1 (L) 37.0 - 47.0 %    MCV 82.9 81.4 - 97.8 fL    MCH 26.0 (L) 27.0 - 33.0 pg    MCHC 31.4 (L) 32.2 - 35.5 g/dL    RDW 56.6 (H) 35.9 - 50.0 fL    Platelet Count 98 (L) 164 - 446 K/uL    MPV 9.1 9.0 - 12.9 fL   Basic Metabolic Panel (BMP)    Collection Time: 06/06/24  3:42 AM   Result Value Ref Range    Anion Gap 12.0 7.0 - 16.0   IMMATURE PLT FRACTION    Collection Time: 06/06/24  3:42 AM   Result Value Ref Range    Imm. Plt Fraction 0.8 0.6 - 13.1 %   ESTIMATED GFR    Collection Time: 06/06/24  3:42 AM   Result Value Ref Range    GFR (CKD-EPI) 17 (A) >60 mL/min/1.73 m 2   POCT glucose device results    Collection Time: 06/06/24  5:35 AM   Result Value Ref Range    POC Glucose, Blood 127 (H) 65 - 99 mg/dL   POCT glucose device  results    Collection Time: 06/06/24  6:00 AM   Result Value Ref Range    POC Glucose, Blood 166 (H) 65 - 99 mg/dL   POCT glucose device results    Collection Time: 06/06/24  6:58 AM   Result Value Ref Range    POC Glucose, Blood 149 (H) 65 - 99 mg/dL       (click the triangle to expand results)    IMAGING:  DX-CHEST-PORTABLE (1 VIEW)   Final Result      1.  Mild diffuse pulmonary opacity which may be due to edema and/or infiltrates and is mildly increased since prior.   2.  Cardiomegaly.      DX-CHEST-PORTABLE (1 VIEW)   Final Result         1.  Pulmonary edema and/or infiltrates are identified, which appear somewhat increased since the prior exam.   2.  Cardiomegaly      CT-HEAD W/O   Final Result      1.  No evidence of acute intracranial process.      2.  Atrophy.         DX-CHEST-PORTABLE (1 VIEW)   Final Result      Cardiomegaly with interstitial edema.          ASSESSMENT:  # Oliguric ELIZABETH: In setting of cirrhosis, recent bacteremia/abx and afib with RVR. Cr elevated on admit with urine not benign. GN remain on the ddx. HRS diagnosis of exclusions   # Hyponatremia  # Hyperkalemia  # Metabolic acidosis  # ETOH cirrhosis  # Encephalopathy  # Anemia--iron sat recently low  #Thrombocytopenia--likely secondary to liver disease, monitor  # Hypocalcemia  -Received IV supplementation  # Hyperphosphatemia  # Afib with RVR     PLAN:  - No role for RRT yet. She is a very poor long-term dialysis candidate given her liver disease and other comorbidities, recommend against if required.  -Sending limited serologic work-up  -Albumin challenge, holding on midodrine/octreotide for now  -Urine pr/cr  -urine sodium  -Start [facility]  -Palliative care consult    Discussed with hospitalist      Dorian Rojas MD

## 2024-06-07 PROBLEM — N39.0 UTI DUE TO EXTENDED-SPECTRUM BETA LACTAMASE (ESBL) PRODUCING ESCHERICHIA COLI: Status: ACTIVE | Noted: 2024-06-07

## 2024-06-07 PROBLEM — B96.29 UTI DUE TO EXTENDED-SPECTRUM BETA LACTAMASE (ESBL) PRODUCING ESCHERICHIA COLI: Status: ACTIVE | Noted: 2024-06-07

## 2024-06-07 PROBLEM — E83.39 HYPERPHOSPHATEMIA: Status: ACTIVE | Noted: 2024-06-07

## 2024-06-07 PROBLEM — E87.5 HYPERKALEMIA: Status: ACTIVE | Noted: 2024-06-07

## 2024-06-07 PROBLEM — Z16.12 UTI DUE TO EXTENDED-SPECTRUM BETA LACTAMASE (ESBL) PRODUCING ESCHERICHIA COLI: Status: ACTIVE | Noted: 2024-06-07

## 2024-06-07 LAB
ALBUMIN SERPL BCP-MCNC: 3 G/DL (ref 3.2–4.9)
ALBUMIN SERPL BCP-MCNC: 3.2 G/DL (ref 3.2–4.9)
AMMONIA PLAS-SCNC: 37 UMOL/L (ref 11–45)
ANION GAP SERPL CALC-SCNC: 14 MMOL/L (ref 7–16)
BACTERIA UR CULT: ABNORMAL
BACTERIA UR CULT: ABNORMAL
BUN SERPL-MCNC: 69 MG/DL (ref 8–22)
BUN SERPL-MCNC: 71 MG/DL (ref 8–22)
BUN SERPL-MCNC: 74 MG/DL (ref 8–22)
C3 SERPL-MCNC: 67.5 MG/DL (ref 87–200)
C4 SERPL-MCNC: 12.9 MG/DL (ref 19–52)
CALCIUM ALBUM COR SERPL-MCNC: 8.8 MG/DL (ref 8.5–10.5)
CALCIUM ALBUM COR SERPL-MCNC: 9.1 MG/DL (ref 8.5–10.5)
CALCIUM SERPL-MCNC: 8 MG/DL (ref 8.5–10.5)
CALCIUM SERPL-MCNC: 8.5 MG/DL (ref 8.5–10.5)
CALCIUM SERPL-MCNC: 8.8 MG/DL (ref 8.5–10.5)
CHLORIDE SERPL-SCNC: 100 MMOL/L (ref 96–112)
CHLORIDE SERPL-SCNC: 101 MMOL/L (ref 96–112)
CHLORIDE SERPL-SCNC: 99 MMOL/L (ref 96–112)
CO2 SERPL-SCNC: 14 MMOL/L (ref 20–33)
CO2 SERPL-SCNC: 15 MMOL/L (ref 20–33)
CO2 SERPL-SCNC: 15 MMOL/L (ref 20–33)
CREAT SERPL-MCNC: 2.84 MG/DL (ref 0.5–1.4)
CREAT SERPL-MCNC: 2.86 MG/DL (ref 0.5–1.4)
CREAT SERPL-MCNC: 3.07 MG/DL (ref 0.5–1.4)
ERYTHROCYTE [DISTWIDTH] IN BLOOD BY AUTOMATED COUNT: 56.4 FL (ref 35.9–50)
GFR SERPLBLD CREATININE-BSD FMLA CKD-EPI: 16 ML/MIN/1.73 M 2
GFR SERPLBLD CREATININE-BSD FMLA CKD-EPI: 18 ML/MIN/1.73 M 2
GFR SERPLBLD CREATININE-BSD FMLA CKD-EPI: 18 ML/MIN/1.73 M 2
GLUCOSE BLD STRIP.AUTO-MCNC: 151 MG/DL (ref 65–99)
GLUCOSE BLD STRIP.AUTO-MCNC: 158 MG/DL (ref 65–99)
GLUCOSE BLD STRIP.AUTO-MCNC: 170 MG/DL (ref 65–99)
GLUCOSE BLD STRIP.AUTO-MCNC: 189 MG/DL (ref 65–99)
GLUCOSE SERPL-MCNC: 144 MG/DL (ref 65–99)
GLUCOSE SERPL-MCNC: 161 MG/DL (ref 65–99)
GLUCOSE SERPL-MCNC: 162 MG/DL (ref 65–99)
HCT VFR BLD AUTO: 25.4 % (ref 37–47)
HGB BLD-MCNC: 8.2 G/DL (ref 12–16)
MAGNESIUM SERPL-MCNC: 2 MG/DL (ref 1.5–2.5)
MCH RBC QN AUTO: 26.8 PG (ref 27–33)
MCHC RBC AUTO-ENTMCNC: 32.3 G/DL (ref 32.2–35.5)
MCV RBC AUTO: 83 FL (ref 81.4–97.8)
PHOSPHATE SERPL-MCNC: 5.9 MG/DL (ref 2.5–4.5)
PHOSPHATE SERPL-MCNC: 6 MG/DL (ref 2.5–4.5)
PLATELET # BLD AUTO: 97 K/UL (ref 164–446)
PLATELETS.RETICULATED NFR BLD AUTO: 0.9 % (ref 0.6–13.1)
PMV BLD AUTO: 8.5 FL (ref 9–12.9)
POTASSIUM SERPL-SCNC: 5.6 MMOL/L (ref 3.6–5.5)
POTASSIUM SERPL-SCNC: 5.9 MMOL/L (ref 3.6–5.5)
POTASSIUM SERPL-SCNC: 5.9 MMOL/L (ref 3.6–5.5)
RBC # BLD AUTO: 3.06 M/UL (ref 4.2–5.4)
SIGNIFICANT IND 70042: ABNORMAL
SITE SITE: ABNORMAL
SODIUM SERPL-SCNC: 128 MMOL/L (ref 135–145)
SODIUM SERPL-SCNC: 129 MMOL/L (ref 135–145)
SODIUM SERPL-SCNC: 130 MMOL/L (ref 135–145)
SOURCE SOURCE: ABNORMAL
WBC # BLD AUTO: 6.4 K/UL (ref 4.8–10.8)

## 2024-06-07 PROCEDURE — 700102 HCHG RX REV CODE 250 W/ 637 OVERRIDE(OP): Performed by: STUDENT IN AN ORGANIZED HEALTH CARE EDUCATION/TRAINING PROGRAM

## 2024-06-07 PROCEDURE — 99233 SBSQ HOSP IP/OBS HIGH 50: CPT | Mod: 25 | Performed by: STUDENT IN AN ORGANIZED HEALTH CARE EDUCATION/TRAINING PROGRAM

## 2024-06-07 PROCEDURE — 700101 HCHG RX REV CODE 250

## 2024-06-07 PROCEDURE — 99255 IP/OBS CONSLTJ NEW/EST HI 80: CPT | Mod: 25 | Performed by: NURSE PRACTITIONER

## 2024-06-07 PROCEDURE — 99498 ADVNCD CARE PLAN ADDL 30 MIN: CPT | Performed by: NURSE PRACTITIONER

## 2024-06-07 PROCEDURE — 86160 COMPLEMENT ANTIGEN: CPT

## 2024-06-07 PROCEDURE — 99497 ADVNCD CARE PLAN 30 MIN: CPT | Performed by: STUDENT IN AN ORGANIZED HEALTH CARE EDUCATION/TRAINING PROGRAM

## 2024-06-07 PROCEDURE — 82962 GLUCOSE BLOOD TEST: CPT | Mod: 91

## 2024-06-07 PROCEDURE — 83735 ASSAY OF MAGNESIUM: CPT

## 2024-06-07 PROCEDURE — 85027 COMPLETE CBC AUTOMATED: CPT

## 2024-06-07 PROCEDURE — 80069 RENAL FUNCTION PANEL: CPT | Mod: 91

## 2024-06-07 PROCEDURE — A9270 NON-COVERED ITEM OR SERVICE: HCPCS | Performed by: HOSPITALIST

## 2024-06-07 PROCEDURE — 700102 HCHG RX REV CODE 250 W/ 637 OVERRIDE(OP)

## 2024-06-07 PROCEDURE — 700101 HCHG RX REV CODE 250: Performed by: STUDENT IN AN ORGANIZED HEALTH CARE EDUCATION/TRAINING PROGRAM

## 2024-06-07 PROCEDURE — 700111 HCHG RX REV CODE 636 W/ 250 OVERRIDE (IP): Performed by: STUDENT IN AN ORGANIZED HEALTH CARE EDUCATION/TRAINING PROGRAM

## 2024-06-07 PROCEDURE — 700102 HCHG RX REV CODE 250 W/ 637 OVERRIDE(OP): Performed by: INTERNAL MEDICINE

## 2024-06-07 PROCEDURE — 700111 HCHG RX REV CODE 636 W/ 250 OVERRIDE (IP): Mod: JZ

## 2024-06-07 PROCEDURE — 700105 HCHG RX REV CODE 258: Performed by: STUDENT IN AN ORGANIZED HEALTH CARE EDUCATION/TRAINING PROGRAM

## 2024-06-07 PROCEDURE — 700111 HCHG RX REV CODE 636 W/ 250 OVERRIDE (IP): Mod: JZ | Performed by: INTERNAL MEDICINE

## 2024-06-07 PROCEDURE — 770020 HCHG ROOM/CARE - TELE (206)

## 2024-06-07 PROCEDURE — 82140 ASSAY OF AMMONIA: CPT

## 2024-06-07 PROCEDURE — 80048 BASIC METABOLIC PNL TOTAL CA: CPT

## 2024-06-07 PROCEDURE — A9270 NON-COVERED ITEM OR SERVICE: HCPCS | Performed by: INTERNAL MEDICINE

## 2024-06-07 PROCEDURE — 99497 ADVNCD CARE PLAN 30 MIN: CPT | Performed by: NURSE PRACTITIONER

## 2024-06-07 PROCEDURE — 85055 RETICULATED PLATELET ASSAY: CPT

## 2024-06-07 PROCEDURE — 83516 IMMUNOASSAY NONANTIBODY: CPT

## 2024-06-07 PROCEDURE — 700105 HCHG RX REV CODE 258

## 2024-06-07 PROCEDURE — 700102 HCHG RX REV CODE 250 W/ 637 OVERRIDE(OP): Performed by: HOSPITALIST

## 2024-06-07 PROCEDURE — 36415 COLL VENOUS BLD VENIPUNCTURE: CPT

## 2024-06-07 PROCEDURE — A9270 NON-COVERED ITEM OR SERVICE: HCPCS | Performed by: STUDENT IN AN ORGANIZED HEALTH CARE EDUCATION/TRAINING PROGRAM

## 2024-06-07 RX ORDER — FUROSEMIDE 10 MG/ML
40 INJECTION INTRAMUSCULAR; INTRAVENOUS ONCE
Status: COMPLETED | OUTPATIENT
Start: 2024-06-07 | End: 2024-06-08

## 2024-06-07 RX ORDER — FUROSEMIDE 40 MG/1
40 TABLET ORAL
Status: DISCONTINUED | OUTPATIENT
Start: 2024-06-08 | End: 2024-06-08

## 2024-06-07 RX ORDER — METOPROLOL TARTRATE 50 MG/1
50 TABLET, FILM COATED ORAL 2 TIMES DAILY
Status: DISCONTINUED | OUTPATIENT
Start: 2024-06-07 | End: 2024-06-08

## 2024-06-07 RX ORDER — METOPROLOL TARTRATE 1 MG/ML
5 INJECTION, SOLUTION INTRAVENOUS
Status: COMPLETED | OUTPATIENT
Start: 2024-06-07 | End: 2024-06-08

## 2024-06-07 RX ORDER — CALCIUM GLUCONATE 20 MG/ML
2 INJECTION, SOLUTION INTRAVENOUS ONCE
Status: COMPLETED | OUTPATIENT
Start: 2024-06-07 | End: 2024-06-07

## 2024-06-07 RX ADMIN — INSULIN HUMAN 2 UNITS: 100 INJECTION, SOLUTION PARENTERAL at 11:50

## 2024-06-07 RX ADMIN — METOPROLOL TARTRATE 12.5 MG: 25 TABLET, FILM COATED ORAL at 04:16

## 2024-06-07 RX ADMIN — DEXTROSE MONOHYDRATE 25 G: 100 INJECTION, SOLUTION INTRAVENOUS at 23:44

## 2024-06-07 RX ADMIN — DEXTROSE MONOHYDRATE 25 G: 100 INJECTION, SOLUTION INTRAVENOUS at 20:01

## 2024-06-07 RX ADMIN — SPIRONOLACTONE 25 MG: 25 TABLET ORAL at 04:16

## 2024-06-07 RX ADMIN — MEROPENEM 500 MG: 500 INJECTION, POWDER, FOR SOLUTION INTRAVENOUS at 11:59

## 2024-06-07 RX ADMIN — METOPROLOL TARTRATE 12.5 MG: 25 TABLET, FILM COATED ORAL at 09:43

## 2024-06-07 RX ADMIN — CLOTRIMAZOLE AND BETAMETHASONE DIPROPIONATE 1 APPLICATION: 10; .5 CREAM TOPICAL at 17:46

## 2024-06-07 RX ADMIN — OXYCODONE HYDROCHLORIDE 5 MG: 5 TABLET ORAL at 13:49

## 2024-06-07 RX ADMIN — CALCIUM GLUCONATE 2 G: 20 INJECTION, SOLUTION INTRAVENOUS at 20:30

## 2024-06-07 RX ADMIN — METOPROLOL TARTRATE 5 MG: 5 INJECTION INTRAVENOUS at 13:48

## 2024-06-07 RX ADMIN — RIFAXIMIN 550 MG: 550 TABLET ORAL at 04:16

## 2024-06-07 RX ADMIN — SODIUM BICARBONATE 50 MEQ: 84 INJECTION INTRAVENOUS at 23:45

## 2024-06-07 RX ADMIN — LACTULOSE 30 ML: 20 SOLUTION ORAL at 04:16

## 2024-06-07 RX ADMIN — APIXABAN 5 MG: 5 TABLET, FILM COATED ORAL at 04:16

## 2024-06-07 RX ADMIN — METOPROLOL TARTRATE 5 MG: 5 INJECTION INTRAVENOUS at 02:06

## 2024-06-07 RX ADMIN — METOPROLOL TARTRATE 5 MG: 5 INJECTION INTRAVENOUS at 15:42

## 2024-06-07 RX ADMIN — OMEPRAZOLE 20 MG: 20 CAPSULE, DELAYED RELEASE ORAL at 04:16

## 2024-06-07 RX ADMIN — GABAPENTIN 400 MG: 400 CAPSULE ORAL at 04:16

## 2024-06-07 RX ADMIN — CEFAZOLIN SODIUM 1 G: 1 INJECTION, SOLUTION INTRAVENOUS at 04:25

## 2024-06-07 RX ADMIN — INSULIN HUMAN 9 UNITS: 100 INJECTION, SOLUTION PARENTERAL at 19:51

## 2024-06-07 RX ADMIN — METOPROLOL TARTRATE 5 MG: 5 INJECTION INTRAVENOUS at 04:13

## 2024-06-07 RX ADMIN — CLOTRIMAZOLE AND BETAMETHASONE DIPROPIONATE 1 APPLICATION: 10; .5 CREAM TOPICAL at 04:32

## 2024-06-07 RX ADMIN — SODIUM ZIRCONIUM CYCLOSILICATE 10 G: 10 POWDER, FOR SUSPENSION ORAL at 11:51

## 2024-06-07 RX ADMIN — SODIUM BICARBONATE 50 MEQ: 84 INJECTION INTRAVENOUS at 20:04

## 2024-06-07 RX ADMIN — INSULIN HUMAN 9.5 UNITS: 100 INJECTION, SOLUTION PARENTERAL at 23:35

## 2024-06-07 RX ADMIN — METOPROLOL TARTRATE 25 MG: 25 TABLET, FILM COATED ORAL at 13:02

## 2024-06-07 RX ADMIN — APIXABAN 5 MG: 5 TABLET, FILM COATED ORAL at 17:46

## 2024-06-07 ASSESSMENT — LIFESTYLE VARIABLES
HAVE PEOPLE ANNOYED YOU BY CRITICIZING YOUR DRINKING: NO
ON A TYPICAL DAY WHEN YOU DRINK ALCOHOL HOW MANY DRINKS DO YOU HAVE: 0
CONSUMPTION TOTAL: NEGATIVE
AVERAGE NUMBER OF DAYS PER WEEK YOU HAVE A DRINK CONTAINING ALCOHOL: 0
HOW MANY TIMES IN THE PAST YEAR HAVE YOU HAD 5 OR MORE DRINKS IN A DAY: 0
ALCOHOL_USE: NO
TOTAL SCORE: 0
HAVE YOU EVER FELT YOU SHOULD CUT DOWN ON YOUR DRINKING: NO
TOTAL SCORE: 0
EVER FELT BAD OR GUILTY ABOUT YOUR DRINKING: NO
EVER HAD A DRINK FIRST THING IN THE MORNING TO STEADY YOUR NERVES TO GET RID OF A HANGOVER: NO
DOES PATIENT WANT TO STOP DRINKING: NO
TOTAL SCORE: 0

## 2024-06-07 ASSESSMENT — FIBROSIS 4 INDEX
FIB4 SCORE: 3.73

## 2024-06-07 ASSESSMENT — SOCIAL DETERMINANTS OF HEALTH (SDOH)
IN THE PAST 12 MONTHS, HAS THE ELECTRIC, GAS, OIL, OR WATER COMPANY THREATENED TO SHUT OFF SERVICE IN YOUR HOME?: NO
WITHIN THE PAST 12 MONTHS, THE FOOD YOU BOUGHT JUST DIDN'T LAST AND YOU DIDN'T HAVE MONEY TO GET MORE: NEVER TRUE
WITHIN THE PAST 12 MONTHS, YOU WORRIED THAT YOUR FOOD WOULD RUN OUT BEFORE YOU GOT THE MONEY TO BUY MORE: NEVER TRUE

## 2024-06-07 ASSESSMENT — ENCOUNTER SYMPTOMS
ABDOMINAL PAIN: 1
SHORTNESS OF BREATH: 1

## 2024-06-07 NOTE — PROGRESS NOTES
Patient's heart rate has been sustaining >130, sometimes touching to 150s for a few seconds. PRN metoprolol no longer active on MAR. Messaged hospitalist and notified of above information.

## 2024-06-07 NOTE — THERAPY
Physical Therapy Contact Note    Patient Name: April Alicia  Age:  64 y.o., Sex:  female  Medical Record #: [id number]  Today's Date: 6/7/2024 06/07/24 0818   Treatment Variance   Reason For Missed Therapy Non-Medical - Other (Please Comment)  (Pt is pending palliative care consult to discuss GOC. Will hold and re-attempt as appropriate.)   Interdisciplinary Plan of Care Collaboration   IDT Collaboration with  Other (See Comments)  (EMR)   Session Information   Date / Session Number  6/6, 6/7 - hold     Alix Vides, PT, DPT

## 2024-06-07 NOTE — CARE PLAN
Problem: Knowledge Deficit - Standard  Goal: Patient and family/care givers will demonstrate understanding of plan of care, disease process/condition, diagnostic tests and medications  Outcome: Progressing     Problem: Skin Integrity  Goal: Skin integrity is maintained or improved  Outcome: Progressing     Problem: Respiratory  Goal: Patient will achieve/maintain optimum respiratory ventilation and gas exchange  Outcome: Progressing     Problem: Fall Risk  Goal: Patient will remain free from falls  Outcome: Progressing     Problem: Pain - Standard  Goal: Alleviation of pain or a reduction in pain to the patient’s comfort goal  Outcome: Progressing   The patient is Stable - Low risk of patient condition declining or worsening    Shift Goals  Clinical Goals: turn q2 , iv abx, pallative consult  Patient Goals: comfort rest  Family Goals: N/A    Progress made toward(s) clinical / shift goals:  palliative onsult, monitor telemetry, heart rate control, skin integrity    Patient is not progressing towards the following goals:

## 2024-06-07 NOTE — ASSESSMENT & PLAN NOTE
Persistent hyperkalemia, received multiple treatments of a hyperkalemia  Due to ELIZABETH    6/8 Potassium 6, increase the Lokelma to twice daily  6/9 k 5.5>5  IV Lasix increased to 60 mg daily  Continue Lokelma  Continue telemetry monitor

## 2024-06-07 NOTE — PROGRESS NOTES
Dr Bishop notified that heart rate has been sustaining 135-145 this morning. Frequently touches up to 160s, back down within a couple of seconds.

## 2024-06-07 NOTE — PROGRESS NOTES
Hospital Medicine Daily Progress Note    Date of Service  6/7/2024    Chief Complaint  April Alicia is a 64 y.o. female admitted 6/3/2024 with diarrhea generalized weakness    Hospital Course  64 y.o. female with a past medical history of elevated BMI of 26, diabetes, alcoholic cirrhosis, atrial fibrillation on eliquis, recent diagnosis of urinary tract infection who presented 6/3/2024 with generalized weakness diarrhea and palpitations.  Patient has not been feeling well over the past 1 day.  She has been progressively worsening confusion generalized weakness after a few episodes of diarrhea.  She was found to have atrial fibrillation with rapid ventricular response.       Chest x-ray showed pulmonary edema so DC'd IV fluid and giving a dose of Lasix.      ELIZABETH -nephro following, recommend against HD given advanced cirrhosis    Interval Problem Update  I saw and examined the patient at bedside  Care plan was discussed and updated with the daughter at the bedside  AO times 1-2    A-fib with RVR -I increased metoprolol to 50 mg twice daily, IV metoprolol as needed    ESBL E. COLI UTI -discussed with pharmacy, antibiotics changed to meropenem    Hyperkalemia -potassium of 5.6, will repeat BMP    Phos 6.1  Co2 16    I have discussed this patient's plan of care and discharge plan at IDT rounds today with Case Management, Nursing, Nursing leadership, and other members of the IDT team.    Consults   Nephrology   Palliative care    Disposition  The patient is not medically cleared for discharge to home or a post-acute facility.      I have placed the appropriate orders for post-discharge needs.    Review of Systems  Review of Systems   Unable to perform ROS: Medical condition        Physical Exam  Temp:  [36.6 °C (97.9 °F)-36.7 °C (98.1 °F)] 36.7 °C (98.1 °F)  Pulse:  [103-146] 131  Resp:  [17-18] 18  BP: (111-153)/() 150/111  SpO2:  [94 %-99 %] 94 %    Physical Exam  Vitals and nursing note reviewed.    Constitutional:       General: She is not in acute distress.     Appearance: She is well-developed. She is ill-appearing.      Comments: Very somnolent, not answering questions during my evaluation. Will open her eyes   HENT:      Head: Normocephalic.   Eyes:      Conjunctiva/sclera: Conjunctivae normal.   Cardiovascular:      Rate and Rhythm: Tachycardia present. Rhythm irregular.   Pulmonary:      Effort: Pulmonary effort is normal. No respiratory distress.      Breath sounds: No wheezing.   Abdominal:      General: There is no distension.      Tenderness: There is no abdominal tenderness.   Skin:     General: Skin is warm.      Coloration: Skin is jaundiced.   Neurological:      Mental Status: She is disoriented.   Psychiatric:         Behavior: Behavior is uncooperative.      Comments: Unable to assess         Fluids    Intake/Output Summary (Last 24 hours) at 6/7/2024 1320  Last data filed at 6/7/2024 0430  Gross per 24 hour   Intake 600 ml   Output 200 ml   Net 400 ml         Laboratory  Recent Labs     06/05/24  0005 06/06/24  0342 06/07/24  0112   WBC 8.8 7.5 6.4   RBC 3.43* 3.15* 3.06*   HEMOGLOBIN 9.1* 8.2* 8.2*   HEMATOCRIT 29.1* 26.1* 25.4*   MCV 84.8 82.9 83.0   MCH 26.5* 26.0* 26.8*   MCHC 31.3* 31.4* 32.3   RDW 57.9* 56.6* 56.4*   PLATELETCT 109* 98* 97*   MPV 10.4 9.1 8.5*     Recent Labs     06/05/24  0005 06/06/24  0342 06/06/24  1106 06/07/24  0112   SODIUM 131* 130*  --  129*   POTASSIUM 5.2 5.7* 5.5 5.6*   CHLORIDE 101 102  --  100   CO2 18* 16*  --  15*   GLUCOSE 104* 126*  --  144*   BUN 55* 60*  --  69*   CREATININE 2.83* 3.02*  --  3.07*   CALCIUM 8.0* 7.6*  --  8.0*                   Imaging  DX-CHEST-PORTABLE (1 VIEW)   Final Result      1.  Mild diffuse pulmonary opacity which may be due to edema and/or infiltrates and is mildly increased since prior.   2.  Cardiomegaly.      DX-CHEST-PORTABLE (1 VIEW)   Final Result         1.  Pulmonary edema and/or infiltrates are identified, which  appear somewhat increased since the prior exam.   2.  Cardiomegaly      CT-HEAD W/O   Final Result      1.  No evidence of acute intracranial process.      2.  Atrophy.         DX-CHEST-PORTABLE (1 VIEW)   Final Result      Cardiomegaly with interstitial edema.           Assessment/Plan  * Acute encephalopathy- (present on admission)  Assessment & Plan  **CT scan of the head did not show evidence for acute infarction or hemorrhage  Per daughter, patient is lucid and cogent at baseline  Daughter feels patient becomes confused when she has a UTI, collected urine which is pending.  UA +, started on rocephin   Now more confused  Ammonia ava due to holding lactulose  Resuming rifaximin and lactulose    Acute cystitis without hematuria- (present on admission)  Assessment & Plan  UA +  Urine culture- lactose fermenting gram-negative sneha  Ancef     Hyperphosphatemia  Assessment & Plan  Due to ELIZABETH  monitor    Hyperkalemia  Assessment & Plan  K 5.6  I ordered repeat  Continue Lokelma, lactulose      UTI due to extended-spectrum beta lactamase (ESBL) producing Escherichia coli  Assessment & Plan  discussed with pharmacy, antibiotics changed to meropenem    ELIZABETH (acute kidney injury) (HCC)- (present on admission)  Assessment & Plan  Mostly due to dehydration secondary to diarrhea and over medication with diuretics    Metabolic acidosis- (present on admission)  Assessment & Plan  Likely due to reduced intravascular volume and increase bicarb losses through diarrhea and acute kidney injury  Initially given IV fluids and then these were discontinued after developing volume overload    monitor    Diarrhea- (present on admission)  Assessment & Plan  Differentials include overmedication with lactulose, viral infection, C. difficile colitis  [has been on antibiotics for UTI]  C. difficile initially ordered and pending but unlikely to be this given that patient has not had a bowel movement for a day, so discontinued  Supportive care  with intravenous fluids monitor electrolytes and replace accordingly     Dehydration- (present on admission)  Assessment & Plan  Secondary to diarrhea, she is on lactulose  Initially held home diuretics    Hyponatremia- (present on admission)  Assessment & Plan  Likely related to cirrhosis  Avoid rapid or aggressive correction    Anemia- (present on admission)  Assessment & Plan  Appears to be chronic  No evidence of gross bleeding.    Type 2 diabetes mellitus with diabetic neuropathy, unspecified (HCC)- (present on admission)  Assessment & Plan  With no hyperglycemia  Hold metformin while inpatient   ISS    GERD (gastroesophageal reflux disease)- (present on admission)  Assessment & Plan  I will start omeprazole    Hyperlipidemia- (present on admission)  Assessment & Plan  Cardiac diet when able to take orally.  I will start atorvastatin    Paroxysmal atrial fibrillation- (present on admission)  Assessment & Plan  Resume apixaban for stroke prophylaxis  Resume oral metoprolol  As needed Lopressor pushes    6/7 still afib RVR  I increased metoprolol to 50 mg twice daily, IV metoprolol as needed  Continue tele monitor    Thrombocytopenia (HCC)- (present on admission)  Assessment & Plan  Plt 100s  Due to cirrhosis  monitor    ACP (advance care planning)- (present on admission)  Assessment & Plan  6/7 patient was DNR/DNI in the past, then changed it full code during this admission.  Patient admitted for A-fib RVR, encephalopathy, ELIZABETH.  not a candidate for dialysis per nephrology. Nephrology recommended a comfort care. History of alcoholic cirrhosis, diabetes . Poor functional status and prognosis.  The palliative care discussed with the patient's daughter, who discussed with the brother.  I had a lengthy discussion with the daughter and emphasized the poor prognosis of the patient.  However, the daughter and the son decided to continue full resuscitation, including dialysis if possible.  Continue full code.  ACP 16  minutes.       Total time spent in chart review, at bedside with the patient, discussing with nursing and case management: 52 minutes    VTE prophylaxis: apixiban    I have performed a physical exam and reviewed and updated ROS and Plan today (6/7/2024). In review of yesterday's note (6/6/2024), there are no changes except as documented above.    I spent greater than 51 minutes for chart review, obtaining history independently, performing medically appropriate examination,  documenting , ordering medications, tests, or procedures, referring and communicating with other health care professionals, Independently interpreting results and communicating results with patient/family/caregiver. More than 50% of time was spent in face-to-face clinical encounter.     VTE Selection

## 2024-06-07 NOTE — THERAPY
Physical Therapy Contact Note    Patient Name: April Alicia  Age:  64 y.o., Sex:  female  Medical Record #: [id number]  Today's Date: 6/6/2024 06/06/24 0807   Initial Contact Note    Initial Contact Note Order Received and Verified, Physical Therapy Evaluation in Progress with Full Report to Follow.   Interdisciplinary Plan of Care Collaboration   IDT Collaboration with  Nursing   Collaboration Comments PT orders received. Per RN, pt is confused, lethargic, and not following any commands due to cognition. Per RN and EMR, pt is a long term resident at [facility] and plans to return there upon DC. Will follow for acute care PT needs.   Session Information   Date / Session Number  6/6 - hold  (EVAL)     Alix Vides, PT, DPT

## 2024-06-07 NOTE — CARE PLAN
The patient is Stable - Low risk of patient condition declining or worsening    Shift Goals  Clinical Goals: monitor HR/rhythm, monitor mental status  Patient Goals: rest  Family Goals: N/A    Progress made toward(s) clinical / shift goals:    Problem: Skin Integrity  Goal: Skin integrity is maintained or improved  Description: Target End Date:  Prior to discharge or change in level of care    Document interventions on Skin Risk/Sincere flowsheet groups and corresponding LDA    1.  Assess and monitor skin integrity, appearance and/or temperature  2.  Assess risk factors for impaired skin integrity and/or pressures ulcers  3.  Implement precautions to protect skin integrity in collaboration with interdisciplinary team  4.  Implement pressure ulcer prevention protocol if at risk for skin breakdown  5.  Confirm wound care consult if at risk for skin breakdown  6.  Ensure patient use of pressure relieving devices  (Low air loss bed, waffle overlay, heel protectors, ROHO cushion, etc)  Outcome: Progressing  Note: Appropriate wound, pressure injury prevention, and incontinence dermatitis prevention protocols in place. Pt is on a KIMMY bed and q2 turns being undergone.     Problem: Fall Risk  Goal: Patient will remain free from falls  Description: Target End Date:  Prior to discharge or change in level of care    Document interventions on the Juliana Dickey Fall Risk Assessment    1.  Assess for fall risk factors  2.  Implement fall precautions  Outcome: Progressing  Note: Pt is a high fall risk. Bed is in lowest, locked position. Bed alarm is on. Pt reeducated on how to utilize call light.

## 2024-06-07 NOTE — CONSULTS
MRN: [id number]  Date of palliative consult: 24  Reason for consult: ACP  Referring provider: Dr. Sifuentes  Location of consult: T837-00  Additional consulting services: Nephrology     HPI:   April Alicia is a 64 y.o. female with medical history significant for diabetes, alcoholic cirrhosis, atrial fibrillation, diabetes, atrial fibrillation on anticoagulation, recent UTI and bacteremia transferred from [facility] 6/3/2024 with weakness, diarrhea, and palpitations found to be in atrial fibrillation with RVR.  Patient being treated for atrial fibrillation with RVR and pneumonia.  Hospital course complicated by progressive confusion and generalized weakness with some diarrhea.  Failed swallow evaluation.  Ammonia 71.    ROS:    Review of Systems   Unable to perform ROS: Acuity of condition   Respiratory:  Positive for shortness of breath.    Gastrointestinal:  Positive for abdominal pain.       PE:   Recent vital signs  BMI: Body mass index is 33.15 kg/m².    Temp (24hrs), Av.7 °C (98 °F), Min:36.6 °C (97.9 °F), Max:36.7 °C (98.1 °F)  Temperature: 36.7 °C (98.1 °F), Monitored Temp: 36.5 °C (97.7 °F)  Pulse  Av.5  Min: 61  Max: 149   Blood Pressure: (!) 150/111 (Let rn know)       Physical Exam  Constitutional:       Appearance: She is ill-appearing.      Interventions: Nasal cannula in place.      Comments: Awoke easily   Musculoskeletal:      Comments: Left BKA; right prior limb preservation surgery   Neurological:      Mental Status: She is disoriented.       ASSESSMENT/PLAN WITH SHARED DECISION MAKING:   PHYSICAL ASPECTS OF CARE  Palliative Performance Scale: 30%    # Atrial fibrillation with RVR  # Community-acquired pneumonia  # Acute encephalopathy  # Acute kidney injury  # Sacral decubitus ulcer stage II  # Dehydration with electrolyte derangements  #Diarrhea   #Chronic conditions include alcoholic cirrhosis, PVD resulting in LLE BKA and RLE limb preservation surgery, anemia of chronic  disease, T2DM, GERD, atrial fibrillation, HLD    SOCIAL ASPECTS OF CARE  Single. Has 2 adult children will need to and Luz who are agent and Co. agent for healthcare decisions respectively.    SPIRITUAL ASPECTS OF CARE   Nondenominational; spiritual care order placed per patient's daughter.    GOALS OF CARE/SERIOUS ILLNESS CONVERSATION  Introduced myself to patient. Discussed role of palliative care and reason for consult.  Patient confused and has mostly intelligible jumbled speech.  Call placed to patient's daughter/primary healthcare agent will need to.  Introduced myself and discussed role on care team.  She was agreeable to discuss goals of care.  Asked permission to discuss prognosis and recommendations.  Discussed patient's prognosis is quite poor.  She has significant renal failure on top of liver cirrhosis as well as PVD and other chronic conditions.  Recommended against resuscitative measures or dialysis given patient's chronic comorbid conditions and the poor likelihood that it would improve patient's quality of life.  Strongly recommended urgent decision regarding CODE STATUS given patient's frailty.  Answered questions about prognosis if patient has renal failure without dialysis in terms of weeks.  Discussed prognostication can be challenging to pinpoint exactly and often surprising.  We discussed care options including comfort focused treatment and hospice care.  Patient's daughter was appreciative of the phone call and confirm she would process information and discuss with her brother Vernon.  Provided palliative care contact information and encouraged Garth to reach out with any questions/needs. Updated Dr. Bishop.     Code Status: Full code per daughter; DNR/DNI per POLST on file from January 2023; full code/full treat from May 2024 hanging behind patient's bed.  Copy obtained to scanned into EMR.    ACP Documents: Advance directive and POLST form on file and reviewed in detail; both completed by  palliative care RN January 2023.      Patient's daughter Sabine is healthcare agent and Vernon Laureano is first alternate.  Regarding statement of desires concerning treatment: patient selected #2, 3, 5, 6 meaning they would not want life-sustaining prolonging treatments in cases of irreversible coma, incurable/terminal illness/condition without reasonable hope of long-term recovery or survival. They would not want treatments to be provided/continued if burdens of treatment outweigh the expected benefit.  POA HC is to consider relief of suffering, preservation/restoration of function, and quality as well as possible extension of life.  If they have an incurable/terminal condition without reasonable hope of long-term recovery or survival, they would want medications to be provided to alleviate suffering without regard to the risk for addiction or hastening death.      Regarding desires concerning living arrangements: patient would want to live in their home as long as it is safe and medical needs can be met.  Healthcare agent may arrange for home care.  When it is no longer safe patient authorizes healthcare agent to place patient in a facility/home that can provide medical assistance and support.  Before placement patient wishes for healthcare agent o discuss/share information concerning placement with patient.    POLST form: DNR/allow natural death, selective treatment, trial of artificial nutrition if she is improving.    50 minutes spent discussing advance care planning, this time excludes any other billed services.    Interval diagnostic studies and medical documentation entries pertinent to this case were reviewed independently by me. This patient has at least one acute or chronic illness or injury that poses a threat to life or bodily function. This patient suffers from a high risk of morbidity from additional invasive diagnostic testing or intensive treatment. Discussion of recommendations and coordination  "of care undertaken with primary provider/treatment team.      Jayshree \"Adenike\" NELLA Gutierrez, [facility]  Inpatient Palliative Care (service hours Mon-Fri 8AM - 5PM)  [phone]      "

## 2024-06-07 NOTE — PROGRESS NOTES
"[facility] -  PROGRESS NOTE               Author: Dorian Rojas M.D. Date & Time: 6/7/2024  7:40 AM     HPI:  64 y.o. female with a past medical history of diabetes, Cirrhosis, atrial fibrillation, recent bacteremia who presented on 6/3/2024 from SNF with weakness, diarrhea, palpitations found to have Afib with RVR. She is currently encephalopathic, unable to participate in interview as such HP per chart review and 3rd person accounts. Initially started on fluids. Then Found to have altered mentation with elevated ammonia and pulm edema prompting dose of lasix. Prolonged RVR with intermittent documented low Bps. Cr 2.9 (as low as 1.3 on 5/25) on admit without significant improvement thus far. Only 320cc UOP documented last 24 hours, UA on 6/4 with large cellular component (WBC/RBC) and proteinuria, concern for UTI so started on abx. Recent course of abx for strep bacteremia/HCAP. Continued on aldactone. Thus far family has wanted to pursue all measures.     DAILY NEPHROLOGY SUMMARY:  6/6: Consult done  6/7: Remains encephalopathic but more alert, unable to participate in interview, Bps improved, 200cc UOP, cr linear    PAST FAMILY HISTORY: Reviewed and Unchanged  SOCIAL HISTORY: Reviewed and Unchanged  CURRENT MEDICATIONS: Reviewed  IMAGING STUDIES: Reviewed    ROS  Unable to obtain    PHYSICAL EXAM  VS:  BP (!) 140/99   Pulse (!) 146   Temp 36.6 °C (97.9 °F) (Temporal)   Resp 18   Ht 1.702 m (5' 7\")   Wt 96 kg (211 lb 10.3 oz)   LMP 06/02/2008   SpO2 96%   BMI 33.15 kg/m²   GENERAL: Ill appearing  CV: +edema  RESP: non-labored with supplemental O2  GI: Soft  MSK: LLE AKA  SKIN: RLE skin graft  NEURO: AOx1  PSYCH: Unable to assess    Fluids:  In: 400 [P.O.:400]  Out: 200     LABS:  Recent Results (from the past 24 hour(s))   POCT glucose device results    Collection Time: 06/06/24  8:25 AM   Result Value Ref Range    POC Glucose, Blood 135 (H) 65 - 99 mg/dL   POCT glucose device " results    Collection Time: 06/06/24 10:44 AM   Result Value Ref Range    POC Glucose, Blood 132 (H) 65 - 99 mg/dL   ABG - LAB    Collection Time: 06/06/24 10:58 AM   Result Value Ref Range    Ph 7.31 (L) 7.40 - 7.50    Pco2 31.2 26.0 - 37.0 mmHg    Po2 54.6 (L) 64.0 - 87.0 mmHg    O2 Saturation 83.2 (L) 93.0 - 99.0 %    Hco3 16 (L) 17 - 25 mmol/L    Base Excess -10 (L) -4 - 3 mmol/L    Body Temp 36.7 Centigrade    O2 Therapy 2.0     Ph -TC 7.32 (L) 7.40 - 7.50    Pco2 -TC 30.8 26.0 - 37.0 mmHg    Po2 -TC 53.5 (L) 64.0 - 87.0 mmHg   POTASSIUM SERUM (K)    Collection Time: 06/06/24 11:06 AM   Result Value Ref Range    Potassium 5.5 3.6 - 5.5 mmol/L   PROTEIN/CREAT RATIO URINE    Collection Time: 06/06/24  4:00 PM   Result Value Ref Range    Total Protein, Urine 83.0 (H) 0.0 - 15.0 mg/dL    Creatinine, Random Urine 220.08 mg/dL    Protein Creatinine Ratio 377 (H) 10 - 107 mg/g   URINE SODIUM RANDOM    Collection Time: 06/06/24  4:00 PM   Result Value Ref Range    Sodium, Urine -per volume 28 mmol/L   POCT glucose device results    Collection Time: 06/06/24  5:30 PM   Result Value Ref Range    POC Glucose, Blood 132 (H) 65 - 99 mg/dL   POCT glucose device results    Collection Time: 06/06/24  9:50 PM   Result Value Ref Range    POC Glucose, Blood 133 (H) 65 - 99 mg/dL   Renal Function Panel    Collection Time: 06/07/24  1:12 AM   Result Value Ref Range    Sodium 129 (L) 135 - 145 mmol/L    Potassium 5.6 (H) 3.6 - 5.5 mmol/L    Chloride 100 96 - 112 mmol/L    Co2 15 (L) 20 - 33 mmol/L    Glucose 144 (H) 65 - 99 mg/dL    Creatinine 3.07 (H) 0.50 - 1.40 mg/dL    Bun 69 (H) 8 - 22 mg/dL    Calcium 8.0 (L) 8.5 - 10.5 mg/dL    Correct Calcium 8.8 8.5 - 10.5 mg/dL    Phosphorus 6.0 (H) 2.5 - 4.5 mg/dL    Albumin 3.0 (L) 3.2 - 4.9 g/dL   CBC WITHOUT DIFFERENTIAL    Collection Time: 06/07/24  1:12 AM   Result Value Ref Range    WBC 6.4 4.8 - 10.8 K/uL    RBC 3.06 (L) 4.20 - 5.40 M/uL    Hemoglobin 8.2 (L) 12.0 - 16.0 g/dL     Hematocrit 25.4 (L) 37.0 - 47.0 %    MCV 83.0 81.4 - 97.8 fL    MCH 26.8 (L) 27.0 - 33.0 pg    MCHC 32.3 32.2 - 35.5 g/dL    RDW 56.4 (H) 35.9 - 50.0 fL    Platelet Count 97 (L) 164 - 446 K/uL    MPV 8.5 (L) 9.0 - 12.9 fL   COMPLEMENT C3    Collection Time: 06/07/24  1:12 AM   Result Value Ref Range    C3 Complement 67.5 (L) 87.0 - 200.0 mg/dL   COMPLEMENT C4    Collection Time: 06/07/24  1:12 AM   Result Value Ref Range    Complement C4 12.9 (L) 19.0 - 52.0 mg/dL   AMMONIA    Collection Time: 06/07/24  1:12 AM   Result Value Ref Range    Ammonia 37 11 - 45 umol/L   IMMATURE PLT FRACTION    Collection Time: 06/07/24  1:12 AM   Result Value Ref Range    Imm. Plt Fraction 0.9 0.6 - 13.1 %   ESTIMATED GFR    Collection Time: 06/07/24  1:12 AM   Result Value Ref Range    GFR (CKD-EPI) 16 (A) >60 mL/min/1.73 m 2       (click the triangle to expand results)      ASSESSMENT:  # Oliguric ELIZABETH: In setting of cirrhosis, recent bacteremia/abx and afib with RVR. Cr elevated on admit with active urine sediment. 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 HRS ddx of exclusion and urine sodium not low making less likely.   # Hyponatremia  # Hyperkalemia  # Metabolic acidosis  # ETOH cirrhosis  # Encephalopathy  # Anemia--iron sat recently low  #Thrombocytopenia--likely secondary to liver disease, monitor  # Hypocalcemia  -Received IV supplementation  # Hyperphosphatemia  # Afib with RVR     PLAN:  - No role for RRT yet. She is a very poor long-term dialysis candidate given her liver disease and other comorbidities, recommend against if required.  - Unlikely to tolerate renal biopsy and based thus far treatment most [facility] supportive  -Send RUSTAM/dsDNA and cryoglobulins  -holding on additional albumin today  -Lokelma daily  -Palliative care consult     Discussed with hospitalist       Dorian Rojas MD

## 2024-06-07 NOTE — PROGRESS NOTES
Assumed care of patient. She is arousable to voice, on 2 liters oxygen. Call light in reach, atrial fib on the monitor with rate 100-120s. Awaiting palliative care consult. Bed alarm in place

## 2024-06-07 NOTE — WOUND TEAM
[facility]  Inpatient Services  Wound and Skin Care Brief Evaluation    Admission Date: 6/3/2024     Last order of IP CONSULT TO WOUND CARE was found on 6/5/2024 from Hospital Encounter on 6/3/2024     HPI, PMH, SH: Reviewed    Chief Complaint   Patient presents with    Irregular Heart Beat     Pt bibcecelia Sifuentessa from [facility] skilled for tachycardia     Diagnosis: Hyponatremia [E87.1]    Unit where seen by Wound Team: T837/00     Wound consult placed regarding sacrum, arms, groin, and L BKA. Chart and images reviewed. This discussed with bedside RN. This clinician in to assess patient. Patient pleasant and agreeable. Patient sacrum with moisture fissure and scabbed, excoriation. Barrier paste applied for protective layer. Patient BUE with scattered bruising otherwise intact. BL groin and pannus with moisture related skin breakdown. Interdry cloth applied to wick moisture. L BKA with scabbing and scar tissue, otherwise intact.    No pressure injuries or advanced wound care needs identified. Wound consult completed. No further follow up unless indicated and consulted.     Moisture Associated Skin Damage 06/06/24 Groin;Panus (Active)   First Observed Date/First Observed Time: 06/06/24 1600   Present on Original Admission: Yes  Laterality: Bilateral  Wound Location : Groin;Panus      Assessments 6/6/2024  5:00 PM   Wound Image      NEXT Weekly Photo (Inpatient Only) 06/13/24   Drainage Amount None   Periwound Assessment Pink;Red   IAD Cleansing Dimethecone Wipes   Periwound Protectant Moisture Barrier   IAD Containment Device Interdry Cloth   WOUND NURSE ONLY - Time Spent with Patient (mins) 30       Moisture Associated Skin Damage 06/06/24 Sacrum (Active)   First Observed Date/First Observed Time: 06/06/24 1600   Present on Original Admission: Yes  Wound Location : Sacrum      Assessments 6/6/2024  5:00 PM   Wound Image     NEXT Weekly Photo (Inpatient Only) 06/13/24   Drainage Amount Scant   Drainage  Description Serosanguineous   Periwound Assessment Pink;Red;Excoriated   IAD Cleansing Dimethecone Wipes   Periwound Protectant Barrier Paste   IAD Containment Device Purewick   WOUND NURSE ONLY - Time Spent with Patient (mins) 30     L LIDA ELLIS      PREVENTATIVE INTERVENTIONS:    Q shift Sincere - performed per nursing policy  Q shift pressure point assessments - performed per nursing policy    Surface/Positioning  Standard/trauma mattress - Currently in Place  TAPs Turning system - Currently in Place    Containment/Moisture Prevention    Purwick/Condom Cath - Currently in Place  Barrier paste - Currently in Place  Interdry - Currently in Place

## 2024-06-07 NOTE — PROGRESS NOTES
Patient positive for ESBL urine, placed patient on contact precautions. Patient given NEDA education re: ESBL.

## 2024-06-07 NOTE — ASSESSMENT & PLAN NOTE
discussed with pharmacy, antibiotics changed to meropenem    6/8  -patient developed tongue swelling/facial swelling with meropenem, discontinued.  Received IV Benadryl and steroids.    I discussed with the pharmacy, sensitivity reviewed, antibiotics changed to Bactrim.     6/9 I changed Bactrim to fosfomycin given the concerns of hyperkalemia

## 2024-06-07 NOTE — PROGRESS NOTES
Bedside report received from off going RN/tech: Ayana, assumed care of patient.     Fall Risk Score: HIGH RISK  Fall risk interventions in place: Provide patient/family education based on risk assessment, Educate patient/family to call staff for assistance when getting out of bed, Place fall precaution signage outside patient door, Place patient in room close to nursing station, and Utilize bed/chair fall alarm  Bed type: Low air loss (Sincere Score less than 17 interventions in place)  Patient on cardiac monitor: Yes  IVF/IV medications: Not Applicable   Oxygen: How many liters 2L  Bedside sitter: Not Applicable   Isolation: Not applicable

## 2024-06-07 NOTE — PROGRESS NOTES
Monitor Summary     Rhythm: A-fib  Heart Rate:  (touched 160)  Ectopy: R PVC  Measurement: --/.10/--

## 2024-06-08 LAB
AMMONIA PLAS-SCNC: 33 UMOL/L (ref 11–45)
ANION GAP SERPL CALC-SCNC: 12 MMOL/L (ref 7–16)
ANION GAP SERPL CALC-SCNC: 14 MMOL/L (ref 7–16)
BACTERIA BLD CULT: NORMAL
BACTERIA BLD CULT: NORMAL
BASE EXCESS BLDA CALC-SCNC: -7 MMOL/L (ref -4–3)
BODY TEMPERATURE: 36.9 CENTIGRADE
BUN SERPL-MCNC: 73 MG/DL (ref 8–22)
BUN SERPL-MCNC: 76 MG/DL (ref 8–22)
CALCIUM SERPL-MCNC: 8.3 MG/DL (ref 8.5–10.5)
CALCIUM SERPL-MCNC: 8.4 MG/DL (ref 8.5–10.5)
CHLORIDE SERPL-SCNC: 101 MMOL/L (ref 96–112)
CHLORIDE SERPL-SCNC: 102 MMOL/L (ref 96–112)
CO2 SERPL-SCNC: 13 MMOL/L (ref 20–33)
CO2 SERPL-SCNC: 17 MMOL/L (ref 20–33)
CREAT SERPL-MCNC: 2.62 MG/DL (ref 0.5–1.4)
CREAT SERPL-MCNC: 2.84 MG/DL (ref 0.5–1.4)
ERYTHROCYTE [DISTWIDTH] IN BLOOD BY AUTOMATED COUNT: 54.3 FL (ref 35.9–50)
GFR SERPLBLD CREATININE-BSD FMLA CKD-EPI: 18 ML/MIN/1.73 M 2
GFR SERPLBLD CREATININE-BSD FMLA CKD-EPI: 20 ML/MIN/1.73 M 2
GLUCOSE BLD STRIP.AUTO-MCNC: 152 MG/DL (ref 65–99)
GLUCOSE BLD STRIP.AUTO-MCNC: 154 MG/DL (ref 65–99)
GLUCOSE BLD STRIP.AUTO-MCNC: 164 MG/DL (ref 65–99)
GLUCOSE BLD STRIP.AUTO-MCNC: 177 MG/DL (ref 65–99)
GLUCOSE BLD STRIP.AUTO-MCNC: 221 MG/DL (ref 65–99)
GLUCOSE BLD STRIP.AUTO-MCNC: 232 MG/DL (ref 65–99)
GLUCOSE BLD STRIP.AUTO-MCNC: 249 MG/DL (ref 65–99)
GLUCOSE SERPL-MCNC: 182 MG/DL (ref 65–99)
GLUCOSE SERPL-MCNC: 187 MG/DL (ref 65–99)
HCO3 BLDA-SCNC: 18 MMOL/L (ref 17–25)
HCT VFR BLD AUTO: 24.3 % (ref 37–47)
HGB BLD-MCNC: 8 G/DL (ref 12–16)
INHALED O2 FLOW RATE: ABNORMAL L/MIN
LACTATE SERPL-SCNC: 1.4 MMOL/L (ref 0.5–2)
MAGNESIUM SERPL-MCNC: 2 MG/DL (ref 1.5–2.5)
MCH RBC QN AUTO: 26.6 PG (ref 27–33)
MCHC RBC AUTO-ENTMCNC: 32.9 G/DL (ref 32.2–35.5)
MCV RBC AUTO: 80.7 FL (ref 81.4–97.8)
PCO2 BLDA: 34.1 MMHG (ref 26–37)
PCO2 TEMP ADJ BLDA: 34 MMHG (ref 26–37)
PH BLDA: 7.34 [PH] (ref 7.4–7.5)
PH TEMP ADJ BLDA: 7.34 [PH] (ref 7.4–7.5)
PLATELET # BLD AUTO: 100 K/UL (ref 164–446)
PLATELETS.RETICULATED NFR BLD AUTO: 1.1 % (ref 0.6–13.1)
PMV BLD AUTO: 9.9 FL (ref 9–12.9)
PO2 BLDA: 52.1 MMHG (ref 64–87)
PO2 TEMP ADJ BLDA: 51.7 MMHG (ref 64–87)
POTASSIUM SERPL-SCNC: 5.6 MMOL/L (ref 3.6–5.5)
POTASSIUM SERPL-SCNC: 6 MMOL/L (ref 3.6–5.5)
RBC # BLD AUTO: 3.01 M/UL (ref 4.2–5.4)
SAO2 % BLDA: 81.6 % (ref 93–99)
SIGNIFICANT IND 70042: NORMAL
SIGNIFICANT IND 70042: NORMAL
SITE SITE: NORMAL
SITE SITE: NORMAL
SODIUM SERPL-SCNC: 129 MMOL/L (ref 135–145)
SODIUM SERPL-SCNC: 130 MMOL/L (ref 135–145)
SOURCE SOURCE: NORMAL
SOURCE SOURCE: NORMAL
WBC # BLD AUTO: 7.5 K/UL (ref 4.8–10.8)

## 2024-06-08 PROCEDURE — 700102 HCHG RX REV CODE 250 W/ 637 OVERRIDE(OP): Performed by: HOSPITALIST

## 2024-06-08 PROCEDURE — A9270 NON-COVERED ITEM OR SERVICE: HCPCS | Performed by: STUDENT IN AN ORGANIZED HEALTH CARE EDUCATION/TRAINING PROGRAM

## 2024-06-08 PROCEDURE — A9270 NON-COVERED ITEM OR SERVICE: HCPCS | Performed by: HOSPITALIST

## 2024-06-08 PROCEDURE — 82803 BLOOD GASES ANY COMBINATION: CPT

## 2024-06-08 PROCEDURE — 86225 DNA ANTIBODY NATIVE: CPT

## 2024-06-08 PROCEDURE — 36415 COLL VENOUS BLD VENIPUNCTURE: CPT

## 2024-06-08 PROCEDURE — 700105 HCHG RX REV CODE 258: Performed by: STUDENT IN AN ORGANIZED HEALTH CARE EDUCATION/TRAINING PROGRAM

## 2024-06-08 PROCEDURE — 82962 GLUCOSE BLOOD TEST: CPT | Mod: 91

## 2024-06-08 PROCEDURE — 700111 HCHG RX REV CODE 636 W/ 250 OVERRIDE (IP): Mod: JZ

## 2024-06-08 PROCEDURE — A9270 NON-COVERED ITEM OR SERVICE: HCPCS | Performed by: INTERNAL MEDICINE

## 2024-06-08 PROCEDURE — 700102 HCHG RX REV CODE 250 W/ 637 OVERRIDE(OP): Performed by: INTERNAL MEDICINE

## 2024-06-08 PROCEDURE — 770020 HCHG ROOM/CARE - TELE (206)

## 2024-06-08 PROCEDURE — 85027 COMPLETE CBC AUTOMATED: CPT

## 2024-06-08 PROCEDURE — 86038 ANTINUCLEAR ANTIBODIES: CPT

## 2024-06-08 PROCEDURE — 82140 ASSAY OF AMMONIA: CPT

## 2024-06-08 PROCEDURE — 80048 BASIC METABOLIC PNL TOTAL CA: CPT

## 2024-06-08 PROCEDURE — 83735 ASSAY OF MAGNESIUM: CPT

## 2024-06-08 PROCEDURE — 700101 HCHG RX REV CODE 250: Performed by: STUDENT IN AN ORGANIZED HEALTH CARE EDUCATION/TRAINING PROGRAM

## 2024-06-08 PROCEDURE — 92526 ORAL FUNCTION THERAPY: CPT

## 2024-06-08 PROCEDURE — 82595 ASSAY OF CRYOGLOBULIN: CPT

## 2024-06-08 PROCEDURE — 700102 HCHG RX REV CODE 250 W/ 637 OVERRIDE(OP): Performed by: STUDENT IN AN ORGANIZED HEALTH CARE EDUCATION/TRAINING PROGRAM

## 2024-06-08 PROCEDURE — 700111 HCHG RX REV CODE 636 W/ 250 OVERRIDE (IP): Mod: JZ | Performed by: STUDENT IN AN ORGANIZED HEALTH CARE EDUCATION/TRAINING PROGRAM

## 2024-06-08 PROCEDURE — 85055 RETICULATED PLATELET ASSAY: CPT

## 2024-06-08 PROCEDURE — 83605 ASSAY OF LACTIC ACID: CPT

## 2024-06-08 PROCEDURE — 99291 CRITICAL CARE FIRST HOUR: CPT | Performed by: STUDENT IN AN ORGANIZED HEALTH CARE EDUCATION/TRAINING PROGRAM

## 2024-06-08 RX ORDER — SULFAMETHOXAZOLE AND TRIMETHOPRIM 400; 80 MG/1; MG/1
1 TABLET ORAL EVERY 12 HOURS
Status: DISCONTINUED | OUTPATIENT
Start: 2024-06-08 | End: 2024-06-09

## 2024-06-08 RX ORDER — DIPHENHYDRAMINE HYDROCHLORIDE 50 MG/ML
25 INJECTION INTRAMUSCULAR; INTRAVENOUS ONCE
Status: DISCONTINUED | OUTPATIENT
Start: 2024-06-08 | End: 2024-06-08

## 2024-06-08 RX ORDER — CALCIUM GLUCONATE 20 MG/ML
2 INJECTION, SOLUTION INTRAVENOUS ONCE
Status: COMPLETED | OUTPATIENT
Start: 2024-06-08 | End: 2024-06-08

## 2024-06-08 RX ORDER — METHYLPREDNISOLONE SODIUM SUCCINATE 125 MG/2ML
62.5 INJECTION, POWDER, LYOPHILIZED, FOR SOLUTION INTRAMUSCULAR; INTRAVENOUS EVERY 6 HOURS
Status: COMPLETED | OUTPATIENT
Start: 2024-06-08 | End: 2024-06-09

## 2024-06-08 RX ORDER — FUROSEMIDE 10 MG/ML
40 INJECTION INTRAMUSCULAR; INTRAVENOUS
Status: DISCONTINUED | OUTPATIENT
Start: 2024-06-08 | End: 2024-06-09

## 2024-06-08 RX ORDER — DIPHENHYDRAMINE HYDROCHLORIDE 50 MG/ML
25 INJECTION INTRAMUSCULAR; INTRAVENOUS ONCE
Status: COMPLETED | OUTPATIENT
Start: 2024-06-08 | End: 2024-06-08

## 2024-06-08 RX ORDER — SODIUM BICARBONATE 650 MG/1
650 TABLET ORAL 3 TIMES DAILY
Status: DISCONTINUED | OUTPATIENT
Start: 2024-06-08 | End: 2024-06-11 | Stop reason: HOSPADM

## 2024-06-08 RX ORDER — METHYLPREDNISOLONE SODIUM SUCCINATE 125 MG/2ML
125 INJECTION, POWDER, LYOPHILIZED, FOR SOLUTION INTRAMUSCULAR; INTRAVENOUS ONCE
Status: COMPLETED | OUTPATIENT
Start: 2024-06-08 | End: 2024-06-08

## 2024-06-08 RX ADMIN — METOPROLOL TARTRATE 5 MG: 5 INJECTION INTRAVENOUS at 08:37

## 2024-06-08 RX ADMIN — METHYLPREDNISOLONE SODIUM SUCCINATE 62.5 MG: 125 INJECTION, POWDER, FOR SOLUTION INTRAMUSCULAR; INTRAVENOUS at 17:20

## 2024-06-08 RX ADMIN — DIPHENHYDRAMINE HYDROCHLORIDE 25 MG: 50 INJECTION, SOLUTION INTRAMUSCULAR; INTRAVENOUS at 05:50

## 2024-06-08 RX ADMIN — ATORVASTATIN CALCIUM 20 MG: 20 TABLET, FILM COATED ORAL at 22:40

## 2024-06-08 RX ADMIN — LACTULOSE 30 ML: 20 SOLUTION ORAL at 17:19

## 2024-06-08 RX ADMIN — RIFAXIMIN 550 MG: 550 TABLET ORAL at 17:20

## 2024-06-08 RX ADMIN — SODIUM BICARBONATE 50 MEQ: 84 INJECTION INTRAVENOUS at 17:33

## 2024-06-08 RX ADMIN — FUROSEMIDE 40 MG: 10 INJECTION INTRAMUSCULAR; INTRAVENOUS at 00:01

## 2024-06-08 RX ADMIN — METOPROLOL TARTRATE 75 MG: 50 TABLET, FILM COATED ORAL at 17:19

## 2024-06-08 RX ADMIN — INSULIN HUMAN 2 UNITS: 100 INJECTION, SOLUTION PARENTERAL at 17:46

## 2024-06-08 RX ADMIN — FUROSEMIDE 40 MG: 10 INJECTION INTRAMUSCULAR; INTRAVENOUS at 08:32

## 2024-06-08 RX ADMIN — MEROPENEM 500 MG: 500 INJECTION, POWDER, FOR SOLUTION INTRAVENOUS at 00:22

## 2024-06-08 RX ADMIN — SODIUM BICARBONATE 650 MG: 650 TABLET ORAL at 22:40

## 2024-06-08 RX ADMIN — SULFAMETHOXAZOLE AND TRIMETHOPRIM 1 TABLET: 400; 80 TABLET ORAL at 15:04

## 2024-06-08 RX ADMIN — CALCIUM GLUCONATE 2 G: 20 INJECTION, SOLUTION INTRAVENOUS at 17:47

## 2024-06-08 RX ADMIN — SODIUM BICARBONATE 650 MG: 650 TABLET ORAL at 17:20

## 2024-06-08 RX ADMIN — DEXTROSE MONOHYDRATE 25 G: 100 INJECTION, SOLUTION INTRAVENOUS at 17:48

## 2024-06-08 RX ADMIN — APIXABAN 5 MG: 5 TABLET, FILM COATED ORAL at 17:20

## 2024-06-08 RX ADMIN — INSULIN HUMAN 2 UNITS: 100 INJECTION, SOLUTION PARENTERAL at 22:44

## 2024-06-08 RX ADMIN — METHYLPREDNISOLONE SODIUM SUCCINATE 125 MG: 125 INJECTION, POWDER, FOR SOLUTION INTRAMUSCULAR; INTRAVENOUS at 05:50

## 2024-06-08 RX ADMIN — INSULIN HUMAN 10 UNITS: 100 INJECTION, SOLUTION PARENTERAL at 17:46

## 2024-06-08 RX ADMIN — METHYLPREDNISOLONE SODIUM SUCCINATE 62.5 MG: 125 INJECTION, POWDER, FOR SOLUTION INTRAMUSCULAR; INTRAVENOUS at 11:58

## 2024-06-08 RX ADMIN — GABAPENTIN 400 MG: 400 CAPSULE ORAL at 17:20

## 2024-06-08 ASSESSMENT — FIBROSIS 4 INDEX: FIB4 SCORE: 3.62

## 2024-06-08 ASSESSMENT — PAIN DESCRIPTION - PAIN TYPE: TYPE: ACUTE PAIN

## 2024-06-08 NOTE — PROGRESS NOTES
NOC HOSPITALIST CROSS COVER    Notified of repeat BMP demonstrating hyperkalemia with a potassium of 5.9.  She was given insulin/dextrose and sodium bicarbonate per hyperkalemia protocol.  She was also given a dose of IV Lasix 40 mg. A.m. redraw ordered and resulted in improved at 5.6.    Later notified regarding patient having marked tongue swelling and right facial/periorbital edema patient was seen and examined at bedside.  She was protecting her airway and saturating well on 2 L nasal cannula.  Her mentation is improved today compared to prior according to bedside RN, and she is attempting to talk, though her speech is difficult to understand due to her tongue swelling.  Sounds diminished to auscultation.  No evidence of stridor or wheezing.    Vitals:    06/08/24 0528   BP: 107/67   Pulse: (!) 104   Resp: 20   Temp: 36.4 °C (97.5 °F)   SpO2: 94%          Plan:  # Hyperkalemia  -Insulin/dextrose and sodium bicarb per hyperkalemia protocol  -Lasix 40 mg IV  -Continue Lokelma  -A.m. redrawn with improvement to 5.6, improved CO2 to 17    # Tongue/facial swelling  -25 mg IV Benadryl  -Solu-Medrol 125 mg IV stat, followed by 62.5 mg every 6 hours x 3 doses  -Hold meropenem due to concern that this may have precipitated an allergic reaction  -Continuous pulse oximetry  -Monitor closely for signs of respiratory distress, stridor, wheezing - call rapid and notify provider stat    -----------------------------------------------------------------------------------------------------------    Electronically signed by:  Barry Montez, JERAD, APRN, FINESSEP-BC  Hospitalist Services

## 2024-06-08 NOTE — PROGRESS NOTES
Hospital Medicine Daily Progress Note    Date of Service  6/8/2024    Chief Complaint  April Alicia is a 64 y.o. female admitted 6/3/2024 with diarrhea generalized weakness    Hospital Course  64 y.o. female with a past medical history of elevated BMI of 26, diabetes, alcoholic cirrhosis, atrial fibrillation on eliquis, recent diagnosis of urinary tract infection who presented 6/3/2024 with generalized weakness diarrhea and palpitations.  Patient has not been feeling well over the past 1 day.  She has been progressively worsening confusion generalized weakness after a few episodes of diarrhea.  She was found to have atrial fibrillation with rapid ventricular response.       Chest x-ray showed pulmonary edema so DC'd IV fluid and giving a dose of Lasix.      ELIZABETH -nephro following, recommend against HD given advanced cirrhosis    Interval Problem Update  I saw and examined the patient at bedside  Care plan was discussed and updated with the daughter at the bedside  AO times 1-2  Patient is more alert and awake today    A-fib with RVR -I increased metoprolol to 75 mg twice daily, IV metoprolol as needed    ESBL E. COLI UTI -patient developed tongue swelling/facial swelling with meropenem, discontinued.  Received IV Benadryl and steroids.  Patient swelling and tongue swelling improved  I discussed with the pharmacy, sensitivity reviewed, antibiotics changed to Bactrim.     Hyperkalemia-potassium 6, I ordered hyperkalemia protocol  Continue IV Lasix and Lokelma    I have discussed this patient's plan of care and discharge plan at IDT rounds today with Case Management, Nursing, Nursing leadership, and other members of the IDT team.    Consults   Nephrology   Palliative care    Disposition  The patient is not medically cleared for discharge to home or a post-acute facility.      I have placed the appropriate orders for post-discharge needs.    Review of Systems  Review of Systems   Unable to perform ROS:  Medical condition        Physical Exam  Temp:  [36.3 °C (97.3 °F)-36.9 °C (98.4 °F)] 36.6 °C (97.9 °F)  Pulse:  [] 133  Resp:  [16-20] 20  BP: (107-144)/(67-98) 144/97  SpO2:  [94 %-100 %] 96 %    Physical Exam  Vitals and nursing note reviewed.   Constitutional:       General: She is not in acute distress.     Appearance: She is well-developed. She is ill-appearing.      Comments: Very somnolent, not answering questions during my evaluation. Will open her eyes   HENT:      Head: Normocephalic.   Eyes:      Conjunctiva/sclera: Conjunctivae normal.   Cardiovascular:      Rate and Rhythm: Tachycardia present. Rhythm irregular.   Pulmonary:      Effort: Pulmonary effort is normal. No respiratory distress.      Breath sounds: No wheezing.   Abdominal:      General: There is no distension.      Tenderness: There is no abdominal tenderness.   Skin:     General: Skin is warm.      Coloration: Skin is jaundiced.   Neurological:      Mental Status: She is disoriented.   Psychiatric:         Behavior: Behavior is uncooperative.      Comments: Unable to assess         Fluids    Intake/Output Summary (Last 24 hours) at 6/8/2024 1652  Last data filed at 6/8/2024 1500  Gross per 24 hour   Intake 50 ml   Output --   Net 50 ml         Laboratory  Recent Labs     06/06/24  0342 06/07/24  0112 06/08/24  0132   WBC 7.5 6.4 7.5   RBC 3.15* 3.06* 3.01*   HEMOGLOBIN 8.2* 8.2* 8.0*   HEMATOCRIT 26.1* 25.4* 24.3*   MCV 82.9 83.0 80.7*   MCH 26.0* 26.8* 26.6*   MCHC 31.4* 32.3 32.9   RDW 56.6* 56.4* 54.3*   PLATELETCT 98* 97* 100*   MPV 9.1 8.5* 9.9     Recent Labs     06/07/24  2245 06/08/24  0132 06/08/24  1208   SODIUM 130* 130* 129*   POTASSIUM 5.9* 5.6* 6.0*   CHLORIDE 101 101 102   CO2 15* 17* 13*   GLUCOSE 161* 187* 182*   BUN 74* 73* 76*   CREATININE 2.86* 2.84* 2.62*   CALCIUM 8.8 8.3* 8.4*                   Imaging  DX-CHEST-PORTABLE (1 VIEW)   Final Result      1.  Mild diffuse pulmonary opacity which may be due to edema  and/or infiltrates and is mildly increased since prior.   2.  Cardiomegaly.      DX-CHEST-PORTABLE (1 VIEW)   Final Result         1.  Pulmonary edema and/or infiltrates are identified, which appear somewhat increased since the prior exam.   2.  Cardiomegaly      CT-HEAD W/O   Final Result      1.  No evidence of acute intracranial process.      2.  Atrophy.         DX-CHEST-PORTABLE (1 VIEW)   Final Result      Cardiomegaly with interstitial edema.           Assessment/Plan  * Acute encephalopathy- (present on admission)  Assessment & Plan  **CT scan of the head did not show evidence for acute infarction or hemorrhage  Per daughter, patient is lucid and cogent at baseline  Daughter feels patient becomes confused when she has a UTI, collected urine which is pending.  UA +, started on rocephin   Now more confused  Ammonia ava due to holding lactulose  Resuming rifaximin and lactulose    Acute cystitis without hematuria- (present on admission)  Assessment & Plan  UA +  Urine culture- lactose fermenting gram-negative sneha  Ancef     Hyperphosphatemia  Assessment & Plan  Due to ELIZABETH  monitor    Hyperkalemia  Assessment & Plan  Persistent hyperkalemia, received multiple treatments of a hyperkalemia  Due to ELIZABETH    6/8 Potassium 6  I ordered hyperkalemia treatment, including insulin, D50, bicarb, IV calcium gluconate  Continue telemetry monitor  Continue IV Lasix and Lokelma  I discussed with the nephro, increase the Lokelma to twice daily    UTI due to extended-spectrum beta lactamase (ESBL) producing Escherichia coli  Assessment & Plan  discussed with pharmacy, antibiotics changed to meropenem    ELIZABETH (acute kidney injury) (HCC)- (present on admission)  Assessment & Plan  Mostly due to dehydration secondary to diarrhea and over medication with diuretics    6/8 cre flat 3>3>2.84  Nephrology following  Not a candidate for dialysis  Persistent hyperkalemia due to ELIZABETH  Avoid nephrotoxic agent  Renal dose  medications    Metabolic acidosis- (present on admission)  Assessment & Plan  Likely due to reduced intravascular volume and increase bicarb losses through diarrhea and acute kidney injury  Initially given IV fluids and then these were discontinued after developing volume overload    6/8 I discussed with nephro, started on bicarb p.o.    monitor    Diarrhea- (present on admission)  Assessment & Plan  Differentials include overmedication with lactulose, viral infection, C. difficile colitis  [has been on antibiotics for UTI]  C. difficile initially ordered and pending but unlikely to be this given that patient has not had a bowel movement for a day, so discontinued  Supportive care with intravenous fluids monitor electrolytes and replace accordingly     Dehydration- (present on admission)  Assessment & Plan  Secondary to diarrhea, she is on lactulose  Initially held home diuretics    Hyponatremia- (present on admission)  Assessment & Plan  Likely related to cirrhosis  Avoid rapid or aggressive correction    Anemia- (present on admission)  Assessment & Plan  Appears to be chronic  No evidence of gross bleeding.    Type 2 diabetes mellitus with diabetic neuropathy, unspecified (HCC)- (present on admission)  Assessment & Plan  With no hyperglycemia  Hold metformin while inpatient   ISS    GERD (gastroesophageal reflux disease)- (present on admission)  Assessment & Plan  I will start omeprazole    Hyperlipidemia- (present on admission)  Assessment & Plan  Cardiac diet when able to take orally.  I will start atorvastatin    Paroxysmal atrial fibrillation- (present on admission)  Assessment & Plan  Resume apixaban for stroke prophylaxis  Resume oral metoprolol  As needed Lopressor pushes    6/7 still afib RVR  I increased metoprolol to 50 mg twice daily, IV metoprolol as needed  6/8 still A-fib RVR, I increased the metoprolol to 100 mg twice daily  Continue telemetry monitor    Thrombocytopenia (HCC)- (present on  admission)  Assessment & Plan  Plt 100s  Due to cirrhosis  monitor    ACP (advance care planning)- (present on admission)  Assessment & Plan  6/7 patient was DNR/DNI in the past, then changed it full code during this admission.  Patient admitted for A-fib RVR, encephalopathy, ELIZABETH.  not a candidate for dialysis per nephrology. Nephrology recommended a comfort care. History of alcoholic cirrhosis, diabetes . Poor functional status and prognosis.  The palliative care discussed with the patient's daughter, who discussed with the brother.  I had a lengthy discussion with the daughter and emphasized the poor prognosis of the patient.  However, the daughter and the son decided to continue full resuscitation, including dialysis if possible.  Continue full code.  ACP 16 minutes.       Total time spent in chart review, at bedside with the patient, discussing with nursing and case management: 52 minutes    VTE prophylaxis: apixiban    I have performed a physical exam and reviewed and updated ROS and Plan today (6/8/2024). In review of yesterday's note (6/7/2024), there are no changes except as documented above.    Patient is critically ill.   The patient continues to have: Hyperkalemia, ELIZABETH, metabolic acidosis  The vital organ system that is affected is the: cardiovascular system, respiratory system, kidneys  If untreated there is a high chance of deterioration into: Worsening arrhythmia from hyperkalemia, worsening ELIZABETH and eventually death  The critical care that I am providing today is: Hyperkalemia cocktail (calcium, insulin, D50, IV bicarb)  The critical that has been undertaken is medically complex.   There has been no overlap in critical care time.   Critical Care Time not including procedures: 31      VTE Selection

## 2024-06-08 NOTE — PROGRESS NOTES
Per bedside report from day shift RN patient had mentation decline today. Patient currently awake to stimulation, non-verbal, not able to swallow, L side jugular vein distention noted. VSS on 2L o2. Noted to have hyperkalemia (5.9); medications given (see MAR). Repeat BMP ordered. Rapid response team and charge RN notified of patient's condition. Daughter at bedside; updated on POC.

## 2024-06-08 NOTE — CARE PLAN
The patient is Watcher - Medium risk of patient condition declining or worsening    Shift Goals  Clinical Goals: monitor mentation and HR  Patient Goals: gisele  Family Goals: comfort    Progress made toward(s) clinical / shift goals:    Problem: Skin Integrity  Goal: Skin integrity is maintained or improved  Outcome: Progressing  Note: Pt will be turned q2 hours to promote skin integrity.     Problem: Fall Risk  Goal: Patient will remain free from falls  Outcome: Progressing  Note: Fall precautions in place. Pt will remain free of falls.       Patient is not progressing towards the following goals:

## 2024-06-08 NOTE — DIETARY
"Nutrition services: Day 5 of admit.  April Alicia is a 64 y.o. female with admitting DX of Hyponatremia   Consult received for malnutrition screening tool score  fo 2 for 2-13lb wt loss x 1 month and poor PO.    Visited pt, pt's daughter, Ava, and other family at bedside. Ava said that her mom ate fine yesterday for breakfast, but that her appetite has been poor since admit. Ava mentioned that the pt took a pain pill and it caused her to sleep through lunch yesterday. Pt was more alert as well per Ava. She mentioned that the pt's UBW is 187-200lbs and that there has not been any wt loss. Ava mentioned foods that her mom likes.    D/t pt A&Ox1 and likely unable to get consent, RD did not do nutrition focused physical exam. Per visual assessment, pt w/ mild protrusion in clavicle area.     Assessment:  Height: 170.2 cm (5' 7\")  Weight: 101 kg (222 lb 0.1 oz) via bed scale on 6/8  Body mass index is 37.1kg/m²., BMI classification: Obese class II, BMI adjusted for left BKA  Diet/Intake: NPO day 2.     Evaluation:   Per RN note this am, pt A&Ox1.  Labs 6/8: Na 130, K+ 5.6, glu 187, BUN 73, creatinine 2.84, GFR 18  Meds: Lipitor, D10, lasix, SSI (held this am), lactulose, methylprednisolone, omeprazole, zofran PRN, oxycodone PRN, comazine PRN  Skin: moisture associated skin damage to groin and sacrum; pt w/ generalized edema and generalized facial edema, 2+ generalized RUE, LUE edema and 2+ RLE, LLE edema  +BM: 6/6, large  Per chart review, wt is up, however, unable to tell if wt is up d/t current fluid retention  Wt Readings from Last Encounters:   06/08/24 101 kg (222 lb 0.1 oz)   05/12/24 87.1 kg (192 lb 0.3 oz)   01/09/24 85.5 kg (188 lb 7.9 oz)   11/03/23 91.4 kg (201 lb 8 oz)   10/05/23 85.5 kg (188 lb 7.9 oz)     Malnutrition Risk: Does not meet 2 ASPEN criteria at this time.    Recommendations/Plan:  Advance diet as medically feasible.  Will adjust likes/dislikes in meal ticketing " system for if/when diet advances.   Monitor weight.    RD following.

## 2024-06-08 NOTE — CARE PLAN
The patient is Watcher - Medium risk of patient condition declining or worsening    Shift Goals  Clinical Goals: monitor Vs, telemetry  Patient Goals: EDMUND  Family Goals: n/a    Progress made toward(s) clinical / shift goals:      Problem: Respiratory  Goal: Patient will achieve/maintain optimum respiratory ventilation and gas exchange  Outcome: Progressing     Problem: Fall Risk  Goal: Patient will remain free from falls  Outcome: Progressing       Patient is not progressing towards the following goals:

## 2024-06-08 NOTE — PROGRESS NOTES
4 Eyes Skin Assessment Completed by DANYEL Wang and DANYEL Polo.    Head WDL  Ears Redness and Blanching  Nose WDL  Mouth WDL  Neck WDL  Breast/Chest WDL  Shoulder Blades Redness and Blanching  Spine Redness and Blanching  (R) Arm/Elbow/Hand Redness, Blanching, and Bruising  (L) Arm/Elbow/Hand Redness, Blanching, and Bruising  Abdomen Redness, dry, peeling  Groin Redness and Excoriation  Scrotum/Coccyx/Buttocks Blanching  (R) Leg Redness, Blanching, Scar, Scab, Bruising, and Edema, old skin graft  (L) Leg Scar, Scab, Bruising, and Swelling, L AKA  (R) Heel/Foot/Toe Redness, Blanching, Discoloration, Boggy, and Edema  (L) Heel/Foot/Toe AKA          Devices In Places Tele Box, Pulse Ox, and Nasal Cannula      Interventions In Place Gray Ear Foams, NC W/Ear Foams, InterDry, Heel Mepilex, Sacral Mepilex, TAP System, Pillows, Elbow Mepilex, Q2 Turns, Dri-Shalom Pads, Heels Loaded W/Pillows, and Pressure Redistribution Mattress    Possible Skin Injury No    Pictures Uploaded Into Epic N/A  Wound Consult Placed Yes  RN Wound Prevention Protocol Ordered Yes

## 2024-06-08 NOTE — PROGRESS NOTES
"[facility] -  PROGRESS NOTE               Author: Dorian Rojas M.D. Date & Time: 6/8/2024  8:49 AM     HPI:  64 y.o. female with a past medical history of diabetes, Cirrhosis, atrial fibrillation, recent bacteremia who presented on 6/3/2024 from SNF with weakness, diarrhea, palpitations found to have Afib with RVR. She is currently encephalopathic, unable to participate in interview as such HP per chart review and 3rd person accounts. Initially started on fluids. Then Found to have altered mentation with elevated ammonia and pulm edema prompting dose of lasix. Prolonged RVR with intermittent documented low Bps. Cr 2.9 (as low as 1.3 on 5/25) on admit without significant improvement thus far. Only 320cc UOP documented last 24 hours, UA on 6/4 with large cellular component (WBC/RBC) and proteinuria, concern for UTI so started on abx. Recent course of abx for strep bacteremia/HCAP. Continued on aldactone. Thus far family has wanted to pursue all measures.     DAILY NEPHROLOGY SUMMARY:  6/6: Consult done  6/7: Remains encephalopathic but more alert, unable to participate in interview, Bps improved, 200cc UOP, cr linear  6/8: Ios not documented, cr stable, palliative consult-would not want treatments where burden of treatment outweighs benefits, slightly more alert, tired    PAST FAMILY HISTORY: Reviewed and Unchanged  SOCIAL HISTORY: Reviewed and Unchanged  CURRENT MEDICATIONS: Reviewed  IMAGING STUDIES: Reviewed    ROS  Unable to obtain, mentation     PHYSICAL EXAM  VS:  BP (!) 130/92   Pulse (!) 150   Temp 36.4 °C (97.5 °F) (Temporal)   Resp 20   Ht 1.702 m (5' 7\")   Wt 101 kg (222 lb 0.1 oz)   LMP 06/02/2008   SpO2 94%   BMI 34.77 kg/m²   GENERAL: Ill appearing  CV: +edema  RESP: non-labored with supplemental O2  GI: Soft  MSK: LLE AKA  SKIN: RLE skin graft  NEURO: AOx1  PSYCH: Unable to assess    Fluids:  No intake/output data recorded.    LABS:  Recent Results (from the past 24 " hour(s))   POCT glucose device results    Collection Time: 06/07/24 11:49 AM   Result Value Ref Range    POC Glucose, Blood 170 (H) 65 - 99 mg/dL   Renal Function Panel    Collection Time: 06/07/24  1:50 PM   Result Value Ref Range    Sodium 128 (L) 135 - 145 mmol/L    Potassium 5.9 (H) 3.6 - 5.5 mmol/L    Chloride 99 96 - 112 mmol/L    Co2 14 (L) 20 - 33 mmol/L    Glucose 162 (H) 65 - 99 mg/dL    Creatinine 2.84 (H) 0.50 - 1.40 mg/dL    Bun 71 (H) 8 - 22 mg/dL    Calcium 8.5 8.5 - 10.5 mg/dL    Correct Calcium 9.1 8.5 - 10.5 mg/dL    Phosphorus 5.9 (H) 2.5 - 4.5 mg/dL    Albumin 3.2 3.2 - 4.9 g/dL   MAGNESIUM    Collection Time: 06/07/24  1:50 PM   Result Value Ref Range    Magnesium 2.0 1.5 - 2.5 mg/dL   ESTIMATED GFR    Collection Time: 06/07/24  1:50 PM   Result Value Ref Range    GFR (CKD-EPI) 18 (A) >60 mL/min/1.73 m 2   POCT glucose device results    Collection Time: 06/07/24  5:47 PM   Result Value Ref Range    POC Glucose, Blood 151 (H) 65 - 99 mg/dL   POCT glucose device results    Collection Time: 06/07/24  7:50 PM   Result Value Ref Range    POC Glucose, Blood 158 (H) 65 - 99 mg/dL   POCT glucose device results    Collection Time: 06/07/24  8:23 PM   Result Value Ref Range    POC Glucose, Blood 189 (H) 65 - 99 mg/dL   Basic Metabolic Panel    Collection Time: 06/07/24 10:45 PM   Result Value Ref Range    Sodium 130 (L) 135 - 145 mmol/L    Potassium 5.9 (H) 3.6 - 5.5 mmol/L    Chloride 101 96 - 112 mmol/L    Co2 15 (L) 20 - 33 mmol/L    Glucose 161 (H) 65 - 99 mg/dL    Bun 74 (H) 8 - 22 mg/dL    Creatinine 2.86 (H) 0.50 - 1.40 mg/dL    Calcium 8.8 8.5 - 10.5 mg/dL    Anion Gap 14.0 7.0 - 16.0   ESTIMATED GFR    Collection Time: 06/07/24 10:45 PM   Result Value Ref Range    GFR (CKD-EPI) 18 (A) >60 mL/min/1.73 m 2   POCT glucose device results    Collection Time: 06/07/24 11:33 PM   Result Value Ref Range    POC Glucose, Blood 154 (H) 65 - 99 mg/dL   POCT glucose device results    Collection Time: 06/07/24  11:58 PM   Result Value Ref Range    POC Glucose, Blood 249 (H) 65 - 99 mg/dL   CBC WITHOUT DIFFERENTIAL    Collection Time: 06/08/24  1:32 AM   Result Value Ref Range    WBC 7.5 4.8 - 10.8 K/uL    RBC 3.01 (L) 4.20 - 5.40 M/uL    Hemoglobin 8.0 (L) 12.0 - 16.0 g/dL    Hematocrit 24.3 (L) 37.0 - 47.0 %    MCV 80.7 (L) 81.4 - 97.8 fL    MCH 26.6 (L) 27.0 - 33.0 pg    MCHC 32.9 32.2 - 35.5 g/dL    RDW 54.3 (H) 35.9 - 50.0 fL    Platelet Count 100 (L) 164 - 446 K/uL    MPV 9.9 9.0 - 12.9 fL   Basic Metabolic Panel    Collection Time: 06/08/24  1:32 AM   Result Value Ref Range    Sodium 130 (L) 135 - 145 mmol/L    Potassium 5.6 (H) 3.6 - 5.5 mmol/L    Chloride 101 96 - 112 mmol/L    Co2 17 (L) 20 - 33 mmol/L    Glucose 187 (H) 65 - 99 mg/dL    Bun 73 (H) 8 - 22 mg/dL    Creatinine 2.84 (H) 0.50 - 1.40 mg/dL    Calcium 8.3 (L) 8.5 - 10.5 mg/dL    Anion Gap 12.0 7.0 - 16.0   MAGNESIUM    Collection Time: 06/08/24  1:32 AM   Result Value Ref Range    Magnesium 2.0 1.5 - 2.5 mg/dL   IMMATURE PLT FRACTION    Collection Time: 06/08/24  1:32 AM   Result Value Ref Range    Imm. Plt Fraction 1.1 0.6 - 13.1 %   ESTIMATED GFR    Collection Time: 06/08/24  1:32 AM   Result Value Ref Range    GFR (CKD-EPI) 18 (A) >60 mL/min/1.73 m 2   POCT glucose device results    Collection Time: 06/08/24  1:42 AM   Result Value Ref Range    POC Glucose, Blood 164 (H) 65 - 99 mg/dL   ABG - LAB    Collection Time: 06/08/24  5:59 AM   Result Value Ref Range    Ph 7.34 (L) 7.40 - 7.50    Pco2 34.1 26.0 - 37.0 mmHg    Po2 52.1 (L) 64.0 - 87.0 mmHg    O2 Saturation 81.6 (L) 93.0 - 99.0 %    Hco3 18 17 - 25 mmol/L    Base Excess -7 (L) -4 - 3 mmol/L    Body Temp 36.9 Centigrade    O2 Therapy 2L     Ph -TC 7.34 (L) 7.40 - 7.50    Pco2 -TC 34.0 26.0 - 37.0 mmHg    Po2 -TC 51.7 (L) 64.0 - 87.0 mmHg   LACTIC ACID    Collection Time: 06/08/24  5:59 AM   Result Value Ref Range    Lactic Acid 1.4 0.5 - 2.0 mmol/L   AMMONIA    Collection Time: 06/08/24  5:59 AM    Result Value Ref Range    Ammonia 33 11 - 45 umol/L   POCT glucose device results    Collection Time: 06/08/24  8:12 AM   Result Value Ref Range    POC Glucose, Blood 152 (H) 65 - 99 mg/dL       (click the triangle to expand results)      ASSESSMENT:  # Oliguric ELIZABETH: In setting of cirrhosis, recent bacteremia/abx and afib with RVR. Cr elevated on admit with active urine sediment. 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 HRS ddx of exclusion and urine sodium not low making less likely.   # Hyponatremia  # Hyperkalemia  # Metabolic acidosis  # ETOH cirrhosis  # Encephalopathy  # Anemia--iron sat recently low  #Thrombocytopenia--likely secondary to liver disease, monitor  # Hypocalcemia  -Received IV supplementation  # Hyperphosphatemia  # Afib with RVR     PLAN:  - No role for RRT yet. She is a very poor long-term dialysis candidate given her liver disease and other comorbidities, and based on palliative conversations hoping to avoid treatments where burden outweighs benefits, dialysis is an inappropriate mdality if required  - Unlikely to tolerate renal biopsy and based thus far treatment most likely supportive  -FU RUSTAM/dsDNA and cryoglobulins  -holding on additional albumin today  -Lokelma daily  -Strict IOs     Discussed with hospitalist       Dorian Rojas MD

## 2024-06-08 NOTE — PROGRESS NOTES
Monitor summary: Per Monitor Room    afib rhythm rate 116-150  none Ectopy  0.-/0.07/0.-

## 2024-06-08 NOTE — PROGRESS NOTES
Monitor summary:        Rhythm: Afib  Rate: 107-129  Ectopy: (r)PVC  Measurements: -/.07/-        12hr chart check

## 2024-06-08 NOTE — THERAPY
Speech Language Pathology   Daily Treatment     Patient Name: April Alicia  AGE:  64 y.o., SEX:  female  Medical Record #: [id number]  Date of Service: 6/8/2024      Precautions:  Precautions: Fall Risk, Swallow Precautions         Subjective  RN requested re-evaluation as pt made NPO overnight d/t difficulty swallowing pill in setting of lingual swelling 2/2 reaction to antibiotic. Pt received awake. Family at bedside who reported improvement in swelling and LEOBARDO compared to previous date. Pt expressing hunger.       Assessment  PO of thin liquids and puree presented. Adequate oral bolus containment. No cough or throat clear appreciated across all trials. No change in vocal quality or WOB. No oral bolus residue observed. Pt's family pt had been tolerating pureed diet well prior to last night.       Clinical Impressions  Patient presentation this date consistent with at time of FEES completed 6/5 which revealed mild oropharyngeal dysphagia. Recommend diet of puree solids and thin liquids with 1:1 supervision. SLP to follow. Outcomes for pulmonary hygiene should be maximized with use of mobility as pt is able and frequent, thorough oral care.       Recommendations  Diet Consistency: Puree solids with thin liquids   Instrumentation: None indicated at this time  Medication: Whole with puree, Crush with pudding/puree, as appropriate  Supervision: Careful 1:1 hand feeding, Encourage self-feeding  Positioning: Fully upright and midline during oral intake, Remain upright for 30-45 minutes after oral intake, Meals sitting upright in a chair, as tolerated  Risk Management : Small bites/sips, Slow rate of intake, Reduce environmental distractions, Multiple swallows (2) per bite/sips, Periodic cough okay  Oral Care: Q6h      SLP Treatment Plan  Treatment Plan: Dysphagia Treatment  SLP Frequency: 4x Per Week  Estimated Duration: Until Therapy Goals Met      Anticipated Discharge Needs  Discharge Recommendations:  "Recommend post-acute placement for additional speech therapy services prior to discharge home  Therapy Recommendations Upon DC: Dysphagia Training, Patient / Family / Caregiver Education, Community Re-Integration      Patient / Family Goals  Patient / Family Goal #1: \"give me my food.\"  Goal #1 Outcome: Progressing as expected  Short Term Goals  Short Term Goal # 1: Pt will follow simple directives during 1-1 feeding with min cues for redirection and attention.  Goal Outcome # 1: Goal met, new goal added  Short Term Goal # 1 B : Pt will complete FEES w SLP to further evaluate swallow function and inform POC.  Goal Outcome  # 1 B: Goal met  Short Term Goal # 2: Pt will consume single ice chips with staff without s/s of difficulty.  Goal Outcome # 2 : Goal met, new goal aded  Short Term Goal # 2 B : Pt will consume diet of PU/TN given 1:1 spv with no worsening of respiratory status.  Goal Outcome  # 2 B: Progressing as expected      Coco Murphy, SLP  "

## 2024-06-08 NOTE — THERAPY
Speech Language Therapy Contact Note    Patient Name: April Alicia  Age:  64 y.o., Sex:  female  Medical Record #: [id number]  Today's Date: 6/7/2024 06/07/24 1629   Treatment Variance   Reason For Missed Therapy Non-Medical - Other (Please Comment)   Interdisciplinary Plan of Care Collaboration   IDT Collaboration with  Nursing   Collaboration Comments Attempted to see pt for dysphagia management. Pt's family reported pt recently refused PO from family and has been lethargic all day. Politely requested SLP to return at a later time.

## 2024-06-08 NOTE — PROGRESS NOTES
Patient noted to have new tongue swelling and right side face/arm swelling. VSS. On 2L o2 95%. Hospitalist, Barry Montez notified. Patient given IV Benadryl and IV Solumedrol (see MAR). Meropenem held per order due to possible reaction. Reported of change to day shift RN.

## 2024-06-08 NOTE — PROGRESS NOTES
Patient unable to follow commands, unable to take her oral pills.   Family noted swelling to L side of neck, upon assessment pt appears to have moderate-severe new jugular distention. Notified hospitalist of swallow dysfunction, NPO status, and jugular distention.   Orders placed for hyperkalemia treatment. Discussed possible higher level of care if family wants patient a full code.

## 2024-06-08 NOTE — PROGRESS NOTES
Received report from DANYEL Martinez. Assessment completed. Patient A&O x1. Patient is on 2L, Spo2 >90%, no signs of increase work of breathing. Patient reporting 0/10 pain. Bed is locked and in lowest position. Fall and skin precautions in place. Call light within reach.

## 2024-06-09 PROBLEM — N30.00 ACUTE CYSTITIS WITHOUT HEMATURIA: Status: RESOLVED | Noted: 2022-07-11 | Resolved: 2024-06-09

## 2024-06-09 LAB
ANION GAP SERPL CALC-SCNC: 11 MMOL/L (ref 7–16)
ANION GAP SERPL CALC-SCNC: 12 MMOL/L (ref 7–16)
BUN SERPL-MCNC: 81 MG/DL (ref 8–22)
BUN SERPL-MCNC: 86 MG/DL (ref 8–22)
CALCIUM SERPL-MCNC: 8.2 MG/DL (ref 8.5–10.5)
CALCIUM SERPL-MCNC: 8.3 MG/DL (ref 8.5–10.5)
CHLORIDE SERPL-SCNC: 102 MMOL/L (ref 96–112)
CHLORIDE SERPL-SCNC: 103 MMOL/L (ref 96–112)
CO2 SERPL-SCNC: 17 MMOL/L (ref 20–33)
CO2 SERPL-SCNC: 18 MMOL/L (ref 20–33)
CREAT SERPL-MCNC: 2.74 MG/DL (ref 0.5–1.4)
CREAT SERPL-MCNC: 2.84 MG/DL (ref 0.5–1.4)
DSDNA AB TITR SER CLIF: NORMAL {TITER}
ERYTHROCYTE [DISTWIDTH] IN BLOOD BY AUTOMATED COUNT: 54 FL (ref 35.9–50)
GFR SERPLBLD CREATININE-BSD FMLA CKD-EPI: 18 ML/MIN/1.73 M 2
GFR SERPLBLD CREATININE-BSD FMLA CKD-EPI: 19 ML/MIN/1.73 M 2
GLUCOSE BLD STRIP.AUTO-MCNC: 260 MG/DL (ref 65–99)
GLUCOSE BLD STRIP.AUTO-MCNC: 266 MG/DL (ref 65–99)
GLUCOSE BLD STRIP.AUTO-MCNC: 269 MG/DL (ref 65–99)
GLUCOSE BLD STRIP.AUTO-MCNC: 299 MG/DL (ref 65–99)
GLUCOSE SERPL-MCNC: 255 MG/DL (ref 65–99)
GLUCOSE SERPL-MCNC: 300 MG/DL (ref 65–99)
HCT VFR BLD AUTO: 24.7 % (ref 37–47)
HGB BLD-MCNC: 8.1 G/DL (ref 12–16)
MAGNESIUM SERPL-MCNC: 2 MG/DL (ref 1.5–2.5)
MCH RBC QN AUTO: 26.3 PG (ref 27–33)
MCHC RBC AUTO-ENTMCNC: 32.8 G/DL (ref 32.2–35.5)
MCV RBC AUTO: 80.2 FL (ref 81.4–97.8)
MYELOPEROXIDASE AB SER-ACNC: 0 AU/ML (ref 0–19)
NUCLEAR IGG SER QL IA: NORMAL
PHOSPHATE SERPL-MCNC: 5.6 MG/DL (ref 2.5–4.5)
PLATELET # BLD AUTO: 94 K/UL (ref 164–446)
PLATELETS.RETICULATED NFR BLD AUTO: 1.5 % (ref 0.6–13.1)
POTASSIUM SERPL-SCNC: 5 MMOL/L (ref 3.6–5.5)
POTASSIUM SERPL-SCNC: 5.5 MMOL/L (ref 3.6–5.5)
PROTEINASE3 AB SER-ACNC: 1 AU/ML (ref 0–19)
RBC # BLD AUTO: 3.08 M/UL (ref 4.2–5.4)
SODIUM SERPL-SCNC: 131 MMOL/L (ref 135–145)
SODIUM SERPL-SCNC: 132 MMOL/L (ref 135–145)
WBC # BLD AUTO: 6.1 K/UL (ref 4.8–10.8)

## 2024-06-09 PROCEDURE — A9270 NON-COVERED ITEM OR SERVICE: HCPCS | Performed by: STUDENT IN AN ORGANIZED HEALTH CARE EDUCATION/TRAINING PROGRAM

## 2024-06-09 PROCEDURE — A9270 NON-COVERED ITEM OR SERVICE: HCPCS | Performed by: HOSPITALIST

## 2024-06-09 PROCEDURE — 85027 COMPLETE CBC AUTOMATED: CPT

## 2024-06-09 PROCEDURE — A9270 NON-COVERED ITEM OR SERVICE: HCPCS | Performed by: INTERNAL MEDICINE

## 2024-06-09 PROCEDURE — 700102 HCHG RX REV CODE 250 W/ 637 OVERRIDE(OP): Performed by: HOSPITALIST

## 2024-06-09 PROCEDURE — 82962 GLUCOSE BLOOD TEST: CPT | Mod: 91

## 2024-06-09 PROCEDURE — 99233 SBSQ HOSP IP/OBS HIGH 50: CPT | Performed by: STUDENT IN AN ORGANIZED HEALTH CARE EDUCATION/TRAINING PROGRAM

## 2024-06-09 PROCEDURE — 700111 HCHG RX REV CODE 636 W/ 250 OVERRIDE (IP): Mod: JZ | Performed by: STUDENT IN AN ORGANIZED HEALTH CARE EDUCATION/TRAINING PROGRAM

## 2024-06-09 PROCEDURE — 700111 HCHG RX REV CODE 636 W/ 250 OVERRIDE (IP): Mod: JZ | Performed by: INTERNAL MEDICINE

## 2024-06-09 PROCEDURE — 700101 HCHG RX REV CODE 250: Performed by: STUDENT IN AN ORGANIZED HEALTH CARE EDUCATION/TRAINING PROGRAM

## 2024-06-09 PROCEDURE — 700102 HCHG RX REV CODE 250 W/ 637 OVERRIDE(OP): Performed by: INTERNAL MEDICINE

## 2024-06-09 PROCEDURE — 84100 ASSAY OF PHOSPHORUS: CPT

## 2024-06-09 PROCEDURE — 97530 THERAPEUTIC ACTIVITIES: CPT

## 2024-06-09 PROCEDURE — 83735 ASSAY OF MAGNESIUM: CPT

## 2024-06-09 PROCEDURE — 80048 BASIC METABOLIC PNL TOTAL CA: CPT | Mod: 91

## 2024-06-09 PROCEDURE — 700111 HCHG RX REV CODE 636 W/ 250 OVERRIDE (IP): Mod: JZ

## 2024-06-09 PROCEDURE — 770020 HCHG ROOM/CARE - TELE (206)

## 2024-06-09 PROCEDURE — 36415 COLL VENOUS BLD VENIPUNCTURE: CPT

## 2024-06-09 PROCEDURE — 97163 PT EVAL HIGH COMPLEX 45 MIN: CPT

## 2024-06-09 PROCEDURE — 85055 RETICULATED PLATELET ASSAY: CPT

## 2024-06-09 PROCEDURE — 700102 HCHG RX REV CODE 250 W/ 637 OVERRIDE(OP): Performed by: STUDENT IN AN ORGANIZED HEALTH CARE EDUCATION/TRAINING PROGRAM

## 2024-06-09 RX ORDER — LACTULOSE 20 G/30ML
30 SOLUTION ORAL 3 TIMES DAILY
Status: DISCONTINUED | OUTPATIENT
Start: 2024-06-09 | End: 2024-06-11 | Stop reason: HOSPADM

## 2024-06-09 RX ORDER — GRANULES FOR ORAL 3 G/1
3 POWDER ORAL ONCE
Status: COMPLETED | OUTPATIENT
Start: 2024-06-09 | End: 2024-06-09

## 2024-06-09 RX ORDER — FUROSEMIDE 10 MG/ML
60 INJECTION INTRAMUSCULAR; INTRAVENOUS
Status: DISCONTINUED | OUTPATIENT
Start: 2024-06-09 | End: 2024-06-11 | Stop reason: HOSPADM

## 2024-06-09 RX ADMIN — OMEPRAZOLE 20 MG: 20 CAPSULE, DELAYED RELEASE ORAL at 06:01

## 2024-06-09 RX ADMIN — SULFAMETHOXAZOLE AND TRIMETHOPRIM 1 TABLET: 400; 80 TABLET ORAL at 06:01

## 2024-06-09 RX ADMIN — SPIRONOLACTONE 25 MG: 25 TABLET ORAL at 06:01

## 2024-06-09 RX ADMIN — FUROSEMIDE 40 MG: 10 INJECTION INTRAMUSCULAR; INTRAVENOUS at 06:12

## 2024-06-09 RX ADMIN — SODIUM BICARBONATE 650 MG: 650 TABLET ORAL at 20:53

## 2024-06-09 RX ADMIN — SODIUM BICARBONATE 650 MG: 650 TABLET ORAL at 16:00

## 2024-06-09 RX ADMIN — SODIUM ZIRCONIUM CYCLOSILICATE 10 G: 10 POWDER, FOR SUSPENSION ORAL at 08:31

## 2024-06-09 RX ADMIN — METHYLPREDNISOLONE SODIUM SUCCINATE 62.5 MG: 125 INJECTION, POWDER, FOR SOLUTION INTRAMUSCULAR; INTRAVENOUS at 00:36

## 2024-06-09 RX ADMIN — GABAPENTIN 400 MG: 400 CAPSULE ORAL at 06:01

## 2024-06-09 RX ADMIN — FOSFOMYCIN TROMETHAMINE 3 G: 3 GRANULE, FOR SOLUTION ORAL at 16:00

## 2024-06-09 RX ADMIN — GABAPENTIN 400 MG: 400 CAPSULE ORAL at 17:58

## 2024-06-09 RX ADMIN — SODIUM ZIRCONIUM CYCLOSILICATE 10 G: 10 POWDER, FOR SUSPENSION ORAL at 18:00

## 2024-06-09 RX ADMIN — OXYCODONE HYDROCHLORIDE 2.5 MG: 5 TABLET ORAL at 20:54

## 2024-06-09 RX ADMIN — FUROSEMIDE 60 MG: 10 INJECTION, SOLUTION INTRAVENOUS at 08:53

## 2024-06-09 RX ADMIN — METOPROLOL TARTRATE 75 MG: 50 TABLET, FILM COATED ORAL at 17:57

## 2024-06-09 RX ADMIN — LACTULOSE 30 ML: 20 SOLUTION ORAL at 17:58

## 2024-06-09 RX ADMIN — SODIUM BICARBONATE 650 MG: 650 TABLET ORAL at 08:41

## 2024-06-09 RX ADMIN — RIFAXIMIN 550 MG: 550 TABLET ORAL at 06:00

## 2024-06-09 RX ADMIN — METOPROLOL TARTRATE 75 MG: 50 TABLET, FILM COATED ORAL at 06:00

## 2024-06-09 RX ADMIN — INSULIN HUMAN 3 UNITS: 100 INJECTION, SOLUTION PARENTERAL at 20:58

## 2024-06-09 RX ADMIN — INSULIN HUMAN 3 UNITS: 100 INJECTION, SOLUTION PARENTERAL at 18:04

## 2024-06-09 RX ADMIN — ATORVASTATIN CALCIUM 20 MG: 20 TABLET, FILM COATED ORAL at 20:53

## 2024-06-09 RX ADMIN — INSULIN HUMAN 3 UNITS: 100 INJECTION, SOLUTION PARENTERAL at 12:21

## 2024-06-09 RX ADMIN — RIFAXIMIN 550 MG: 550 TABLET ORAL at 17:58

## 2024-06-09 RX ADMIN — INSULIN HUMAN 3 UNITS: 100 INJECTION, SOLUTION PARENTERAL at 08:36

## 2024-06-09 RX ADMIN — APIXABAN 5 MG: 5 TABLET, FILM COATED ORAL at 17:58

## 2024-06-09 RX ADMIN — APIXABAN 5 MG: 5 TABLET, FILM COATED ORAL at 06:01

## 2024-06-09 RX ADMIN — LACTULOSE 30 ML: 20 SOLUTION ORAL at 06:09

## 2024-06-09 ASSESSMENT — GAIT ASSESSMENTS: GAIT LEVEL OF ASSIST: UNABLE TO PARTICIPATE

## 2024-06-09 ASSESSMENT — COGNITIVE AND FUNCTIONAL STATUS - GENERAL
MOBILITY SCORE: 9
STANDING UP FROM CHAIR USING ARMS: TOTAL
WALKING IN HOSPITAL ROOM: TOTAL
SUGGESTED CMS G CODE MODIFIER MOBILITY: CM
CLIMB 3 TO 5 STEPS WITH RAILING: TOTAL
MOVING FROM LYING ON BACK TO SITTING ON SIDE OF FLAT BED: A LOT
MOVING TO AND FROM BED TO CHAIR: A LOT
TURNING FROM BACK TO SIDE WHILE IN FLAT BAD: A LOT

## 2024-06-09 ASSESSMENT — FIBROSIS 4 INDEX: FIB4 SCORE: 3.85

## 2024-06-09 ASSESSMENT — PAIN DESCRIPTION - PAIN TYPE: TYPE: ACUTE PAIN

## 2024-06-09 NOTE — PROGRESS NOTES
4 Eyes Skin Assessment Completed by DANYEL Escobar and DANYEL Kate.    Head WDL  Ears WDL  Nose WDL  Mouth WDL  Neck WDL  Breast/Chest WDL  Shoulder Blades WDL  Spine WDL  (R) Arm/Elbow/Hand Bruising  (L) Arm/Elbow/Hand Bruising  Abdomen WDL  Groin Redness, Non-Blanching, and Excoriation  Scrotum/Coccyx/Buttocks Redness, Non-Blanching, and Moisture Fissure  (R) Leg Scab  (L) Leg Redness and Blanching  (R) Heel/Foot/Toe Redness and Blanching  (L) Heel/Foot/Toe WDL          Devices In Places Tele Box and Pulse Ox      Interventions In Place Gray Ear Foams, Heel Float Boots, TAP System, Pillows, Q2 Turns, and Low Air Loss Mattress    Possible Skin Injury Yes    Pictures Uploaded Into Epic Yes  Wound Consult Placed Yes  RN Wound Prevention Protocol Ordered Yes

## 2024-06-09 NOTE — PROGRESS NOTES
Hospital Medicine Daily Progress Note    Date of Service  6/9/2024    Chief Complaint  April Alicia is a 64 y.o. female admitted 6/3/2024 with diarrhea generalized weakness    Hospital Course  64 y.o. female with a past medical history of elevated BMI of 26, diabetes, alcoholic cirrhosis, atrial fibrillation on eliquis, recent diagnosis of urinary tract infection who presented 6/3/2024 with generalized weakness diarrhea and palpitations.  Patient has not been feeling well over the past 1 day.  She has been progressively worsening confusion generalized weakness after a few episodes of diarrhea.  She was found to have atrial fibrillation with rapid ventricular response.       Chest x-ray showed pulmonary edema so DC'd IV fluid and giving a dose of Lasix.      ELIZABETH -nephro following, recommend against HD given advanced cirrhosis    Interval Problem Update  I saw and examined the patient at bedside  Care plan was discussed and updated with the daughter at the bedside  AO times 1-2  Patient is alert and awake     A-fib with RVR -I increased metoprolol to 75 mg twice daily, IV metoprolol as needed    ESBL E. COLI UTI -I changed Bactrim to fosfomycin given the concerns over hyperkalemia    Hyperkalemia- k 5.5>5  Continue IV Lasix and Lokelma    I have discussed this patient's plan of care and discharge plan at IDT rounds today with Case Management, Nursing, Nursing leadership, and other members of the IDT team.    Consults   Nephrology   Palliative care    Disposition  The patient is not medically cleared for discharge to home or a post-acute facility.      I have placed the appropriate orders for post-discharge needs.    Review of Systems  Review of Systems   Unable to perform ROS: Medical condition        Physical Exam  Temp:  [36.2 °C (97.2 °F)-36.6 °C (97.9 °F)] 36.2 °C (97.2 °F)  Pulse:  [] 72  Resp:  [16-20] 16  BP: (110-144)/(67-97) 125/70  SpO2:  [94 %-100 %] 96 %    Physical Exam  Vitals and  nursing note reviewed.   Constitutional:       General: She is not in acute distress.     Appearance: She is well-developed. She is ill-appearing.      Comments: Very somnolent, not answering questions during my evaluation. Will open her eyes   HENT:      Head: Normocephalic.   Eyes:      Conjunctiva/sclera: Conjunctivae normal.   Cardiovascular:      Rate and Rhythm: Tachycardia present. Rhythm irregular.   Pulmonary:      Effort: Pulmonary effort is normal. No respiratory distress.      Breath sounds: No wheezing.   Abdominal:      General: There is no distension.      Tenderness: There is no abdominal tenderness.   Skin:     General: Skin is warm.      Coloration: Skin is jaundiced.   Neurological:      Mental Status: She is disoriented.   Psychiatric:         Behavior: Behavior is uncooperative.      Comments: Unable to assess         Fluids    Intake/Output Summary (Last 24 hours) at 6/9/2024 1525  Last data filed at 6/9/2024 0856  Gross per 24 hour   Intake 390 ml   Output --   Net 390 ml         Laboratory  Recent Labs     06/07/24  0112 06/08/24  0132 06/09/24  0315   WBC 6.4 7.5 6.1   RBC 3.06* 3.01* 3.08*   HEMOGLOBIN 8.2* 8.0* 8.1*   HEMATOCRIT 25.4* 24.3* 24.7*   MCV 83.0 80.7* 80.2*   MCH 26.8* 26.6* 26.3*   MCHC 32.3 32.9 32.8   RDW 56.4* 54.3* 54.0*   PLATELETCT 97* 100* 94*   MPV 8.5* 9.9  --      Recent Labs     06/08/24  1208 06/09/24  0315 06/09/24  1425   SODIUM 129* 131* 132*   POTASSIUM 6.0* 5.5 5.0   CHLORIDE 102 102 103   CO2 13* 18* 17*   GLUCOSE 182* 255* 300*   BUN 76* 81* 86*   CREATININE 2.62* 2.84* 2.74*   CALCIUM 8.4* 8.3* 8.2*                   Imaging  DX-CHEST-PORTABLE (1 VIEW)   Final Result      1.  Mild diffuse pulmonary opacity which may be due to edema and/or infiltrates and is mildly increased since prior.   2.  Cardiomegaly.      DX-CHEST-PORTABLE (1 VIEW)   Final Result         1.  Pulmonary edema and/or infiltrates are identified, which appear somewhat increased since the  prior exam.   2.  Cardiomegaly      CT-HEAD W/O   Final Result      1.  No evidence of acute intracranial process.      2.  Atrophy.         DX-CHEST-PORTABLE (1 VIEW)   Final Result      Cardiomegaly with interstitial edema.           Assessment/Plan  * Acute encephalopathy- (present on admission)  Assessment & Plan  **CT scan of the head did not show evidence for acute infarction or hemorrhage  Per daughter, patient is lucid and cogent at baseline  Daughter feels patient becomes confused when she has a UTI, collected urine which is pending.  UA +, started on rocephin   Now more confused  Ammonia ava due to holding lactulose  Resuming rifaximin and lactulose    Hyperphosphatemia  Assessment & Plan  Due to ELIZABETH  monitor    Hyperkalemia  Assessment & Plan  Persistent hyperkalemia, received multiple treatments of a hyperkalemia  Due to ELIZABETH    6/8 Potassium 6, increase the Lokelma to twice daily  6/9 k 5.5>5  IV Lasix increased to 60 mg daily  Continue Lokelma  Continue telemetry monitor       UTI due to extended-spectrum beta lactamase (ESBL) producing Escherichia coli  Assessment & Plan  discussed with pharmacy, antibiotics changed to meropenem    6/8  -patient developed tongue swelling/facial swelling with meropenem, discontinued.  Received IV Benadryl and steroids.    I discussed with the pharmacy, sensitivity reviewed, antibiotics changed to Bactrim.     6/9 I changed Bactrim to fosfomycin given the concerns of hyperkalemia    ELIZABETH (acute kidney injury) (HCC)- (present on admission)  Assessment & Plan  Mostly due to dehydration secondary to diarrhea and over medication with diuretics    6/9 cre flat 3>3>2.84  Continue IV Lasix, increased to 60 mg daily  Nephrology following  Not a candidate for dialysis  Persistent hyperkalemia due to ELIZABETH  Avoid nephrotoxic agent  Renal dose medications    Metabolic acidosis- (present on admission)  Assessment & Plan  Likely due to reduced intravascular volume and increase bicarb  losses through diarrhea and acute kidney injury  Initially given IV fluids and then these were discontinued after developing volume overload    6/8 I discussed with nephro, started on bicarb p.o.    monitor    Diarrhea- (present on admission)  Assessment & Plan  Differentials include overmedication with lactulose, viral infection, C. difficile colitis  [has been on antibiotics for UTI]  C. difficile initially ordered and pending but unlikely to be this given that patient has not had a bowel movement for a day, so discontinued  Supportive care with intravenous fluids monitor electrolytes and replace accordingly     Dehydration- (present on admission)  Assessment & Plan  Secondary to diarrhea, she is on lactulose  Initially held home diuretics    Hyponatremia- (present on admission)  Assessment & Plan  Likely related to cirrhosis  Avoid rapid or aggressive correction    Anemia- (present on admission)  Assessment & Plan  Appears to be chronic  No evidence of gross bleeding.    Type 2 diabetes mellitus with diabetic neuropathy, unspecified (HCC)- (present on admission)  Assessment & Plan  With no hyperglycemia  Hold metformin while inpatient   ISS    GERD (gastroesophageal reflux disease)- (present on admission)  Assessment & Plan  I will start omeprazole    Hyperlipidemia- (present on admission)  Assessment & Plan  Cardiac diet when able to take orally.  I will start atorvastatin    Paroxysmal atrial fibrillation- (present on admission)  Assessment & Plan  Resume apixaban for stroke prophylaxis  Resume oral metoprolol  As needed Lopressor pushes    6/7 still afib RVR  I increased metoprolol to 50 mg twice daily, IV metoprolol as needed  6/8 still A-fib RVR, I increased the metoprolol to 100 mg twice daily  6/9 HR improving.  BP stable  Continue telemetry monitor    Thrombocytopenia (HCC)- (present on admission)  Assessment & Plan  Plt 100s  Due to cirrhosis  monitor    ACP (advance care planning)- (present on  admission)  Assessment & Plan  6/7 patient was DNR/DNI in the past, then changed it full code during this admission.  Patient admitted for A-fib RVR, encephalopathy, ELIZABETH.  not a candidate for dialysis per nephrology. Nephrology recommended a comfort care. History of alcoholic cirrhosis, diabetes . Poor functional status and prognosis.  The palliative care discussed with the patient's daughter, who discussed with the brother.  I had a lengthy discussion with the daughter and emphasized the poor prognosis of the patient.  However, the daughter and the son decided to continue full resuscitation, including dialysis if possible.  Continue full code.  ACP 16 minutes.       Total time spent in chart review, at bedside with the patient, discussing with nursing and case management: 52 minutes    VTE prophylaxis: apixiban    I have performed a physical exam and reviewed and updated ROS and Plan today (6/9/2024). In review of yesterday's note (6/8/2024), there are no changes except as documented above.    I spent greater than 52minutes for chart review, obtaining history independently, performing medically appropriate examination,  documenting , ordering medications, tests, or procedures, referring and communicating with other health care professionals, Independently interpreting results and communicating results with patient/family/caregiver. More than 50% of time was spent in face-to-face clinical encounter.       VTE Selection

## 2024-06-09 NOTE — THERAPY
Physical Therapy   Initial Evaluation     Patient Name: April Alicia  Age:  64 y.o., Sex:  female  Medical Record #: [id number]  Today's Date: 6/9/2024     Precautions  Precautions: Fall Risk;Swallow Precautions  Comments: L AKA; reports has prosthetic LLE, but unclear if accurate.    Assessment  Patient is 64 y.o. female with diarrhea generalized weakness, confusion, Afib with RVR. Pulmonary edema, UTI, high ammonia. Hx of DM, alcoholic cirrhosis, Afib, UTI,  L AKA. Pt oriented to self and place, RN stated that orientation waxes and wains. Pt assisted to EOB and then mod A to perform scoots from EOB to Bariatric bedside commode and then back to EOB. Please see flowsheet for additional info. Pt would benefit from continued PT and then DC to post acute placement to continue to work on goals for home with family.     Plan    Physical Therapy Initial Treatment Plan   Treatment Plan : Bed Mobility, Neuro Re-Education / Balance, Therapeutic Activities, Therapeutic Exercise  Treatment Frequency: 4 Times per Week  Duration: Until Therapy Goals Met    DC Equipment Recommendations: Unable to determine at this time  Discharge Recommendations: Recommend post-acute placement for additional physical therapy services prior to discharge home       Subjective    Pt wanting to try to get to [facility] for BM. Agreed to PT.     Objective       06/09/24 5047   Initial Contact Note    Initial Contact Note Order Received and Verified, Physical Therapy Evaluation in Progress with Full Report to Follow.   Precautions   Precautions Fall Risk;Swallow Precautions   Comments L AKA; reports has prosthetic LLE, but unclear if accurate.   Vitals   O2 (LPM) 2   Pain 0 - 10 Group   Therapist Pain Assessment 0   Prior Living Situation   Prior Services Intermittent Physical Support for ADL Per Family   Housing / Facility Skilled Nursing Facility   Steps Into Home 2   Bathroom Set up Walk In Shower;Shower Chair   Equipment Owned Front-Wheel  Walker;4-Wheel Walker;Single Point Cane;Wheelchair;Tub / Shower Seat   Lives with - Patient's Self Care Capacity Adult Children   Comments pt came from SNF prior to admission, was living with her daughter   Prior Level of Functional Mobility   Transfer Status Required Assist   Wheelchair Required Assist   Comments daughter assisted with lenin   Cognition    Cognition / Consciousness X   Speech/ Communication Delayed Responses;Slurred;Hard of Hearing  (better understanding once pt put her dentures in her mouth)   Level of Consciousness Alert   Ability To Follow Commands 1 Step   New Learning Impaired   Attention Impaired   Comments pleasant, cooperative, required redirection at times   Active ROM Lower Body    Active ROM Lower Body  WDL   Strength Lower Body   Comments L AKA, R 3/5   Coordination Lower Body    Coordination Lower Body  WDL   Balance Assessment   Sitting Balance (Static) Fair -   Sitting Balance (Dynamic) Poor +   Weight Shift Sitting Fair   Comments standing not applicable, worked on scooting   Bed Mobility    Supine to Sit Moderate Assist   Sit to Supine Minimal Assist   Scooting Minimal Assist   Rolling Minimal Assist to Rt.;Minimum Assist to Lt.   Comments HOBE for exiting bed, use of bed rail, bed flat for back to bed   Gait Analysis   Gait Level Of Assist Unable to Participate   Functional Mobility   Bed, Chair, Wheelchair Transfer Moderate Assist   Toilet Transfers Moderate Assist   Transfer Method   (scooting EOB to/from bariatric bedside commode)   6 Clicks Assessment - How much HELP from from another person do you currently need... (If the patient hasn't done an activity recently, how much help from another person do you think he/she would need if he/she tried?)   Turning from your back to your side while in a flat bed without using bedrails? 2   Moving from lying on your back to sitting on the side of a flat bed without using bedrails? 2   Moving to and from a bed to a chair (including a  wheelchair)? 2   Standing up from a chair using your arms (e.g., wheelchair, or bedside chair)? 1   Walking in hospital room? 1   Climbing 3-5 steps with a railing? 1   6 clicks Mobility Score 9   Activity Tolerance   Sitting in Chair 10 minutes on BSC   Sitting Edge of Bed 4 minutes total   Comments limted by fatigue and weakness   Edema / Skin Assessment   Edema / Skin  X   Comments breakdown on cocyx area, scarred R lower leg   Short Term Goals    Short Term Goal # 1 Pt will perform supine to/from sit with flat bed and no features supervised in 6 visits to progress bed mobility   Short Term Goal # 2 Pt will perform squat pivot transfer with min A EOB to/from WC in 6 visits to access OOB activities   Short Term Goal # 3 Pt will be able to propel WC 50ft SBA in 6 visits to progress with functional mobility   Education Group   Education Provided Role of Physical Therapist   Role of Physical Therapist Patient Response Patient;Acceptance;Explanation;Verbal Demonstration;Family   Physical Therapy Initial Treatment Plan    Treatment Plan  Bed Mobility;Neuro Re-Education / Balance;Therapeutic Activities;Therapeutic Exercise   Treatment Frequency 4 Times per Week   Duration Until Therapy Goals Met   Problem List    Problems Impaired Bed Mobility;Impaired Transfers;Impaired Ambulation;Decreased Activity Tolerance;Functional Strength Deficit;Impaired Balance;Safety Awareness Deficits / Cognition   Anticipated Discharge Equipment and Recommendations   DC Equipment Recommendations Unable to determine at this time   Discharge Recommendations Recommend post-acute placement for additional physical therapy services prior to discharge home   Interdisciplinary Plan of Care Collaboration   IDT Collaboration with  Nursing;Family / Caregiver  (grandtr and great granddtrs in room)   Patient Position at End of Therapy In Bed;Call Light within Reach;Other (Comments);Family / Friend in Room  (RN and CNA in room upon exit)   Collaboration  Comments RN updated

## 2024-06-09 NOTE — PROGRESS NOTES
Bedside report received from off going RN/tech: Desean, assumed care of patient.     Fall Risk Score: HIGH RISK  Fall risk interventions in place: Place yellow fall risk ID band on patient, Provide patient/family education based on risk assessment, Educate patient/family to call staff for assistance when getting out of bed, Place fall precaution signage outside patient door, Place patient in room close to nursing station, Utilize bed/chair fall alarm, Notify charge of high risk for huddle, and Bed alarm connected correctly  Bed type: Regular (Sincere Score less than 17 interventions in place)  Patient on cardiac monitor: Yes  IVF/IV medications: Not Applicable   Oxygen: How many liters 3L  Bedside sitter: Not Applicable   Isolation: Isolation precautions in place ESBL in urine    Assumed care of patient, A&Ox1-3, slurred speech. Denies pain and SOB at this time. All needs met will continue to monitor.

## 2024-06-09 NOTE — PROGRESS NOTES
"[facility] -  PROGRESS NOTE               Author: Dorian Rojas M.D. Date & Time: 6/9/2024  8:27 AM     HPI:  64 y.o. female with a past medical history of diabetes, Cirrhosis, atrial fibrillation, recent bacteremia who presented on 6/3/2024 from SNF with weakness, diarrhea, palpitations found to have Afib with RVR. She is currently encephalopathic, unable to participate in interview as such HP per chart review and 3rd person accounts. Initially started on fluids. Then Found to have altered mentation with elevated ammonia and pulm edema prompting dose of lasix. Prolonged RVR with intermittent documented low Bps. Cr 2.9 (as low as 1.3 on 5/25) on admit without significant improvement thus far. Only 320cc UOP documented last 24 hours, UA on 6/4 with large cellular component (WBC/RBC) and proteinuria, concern for UTI so started on abx. Recent course of abx for strep bacteremia/HCAP. Continued on aldactone. Thus far family has wanted to pursue all measures.     DAILY NEPHROLOGY SUMMARY:  6/6: Consult done  6/7: Remains encephalopathic but more alert, unable to participate in interview, Bps improved, 200cc UOP, cr linear  6/8: Ios not documented, cr stable, palliative consult-would not want treatments where burden of treatment outweighs benefits, slightly more alert, tired  6/9: UOP still not documented, more alert, communicating better, notes arm pain    PAST FAMILY HISTORY: Reviewed and Unchanged  SOCIAL HISTORY: Reviewed and Unchanged  CURRENT MEDICATIONS: Reviewed  IMAGING STUDIES: Reviewed    ROS  10 point ROS perofrmed    PHYSICAL EXAM  VS:  /67   Pulse 91   Temp 36.4 °C (97.5 °F) (Temporal)   Resp 18   Ht 1.702 m (5' 7\")   Wt 103 kg (226 lb 3.1 oz)   LMP 06/02/2008   SpO2 97%   BMI 35.43 kg/m²   GENERAL: Ill appearing  CV: +edema  RESP: non-labored with supplemental O2  GI: Soft  MSK: LLE AKA  SKIN: RLE skin graft  NEURO: AOx2  PSYCH: Unable to assess    Fluids:  In: 320 " [P.O.:320]  Out: -     LABS:  Recent Results (from the past 24 hour(s))   POCT glucose device results    Collection Time: 06/08/24 11:56 AM   Result Value Ref Range    POC Glucose, Blood 177 (H) 65 - 99 mg/dL   Basic Metabolic Panel    Collection Time: 06/08/24 12:08 PM   Result Value Ref Range    Sodium 129 (L) 135 - 145 mmol/L    Potassium 6.0 (H) 3.6 - 5.5 mmol/L    Chloride 102 96 - 112 mmol/L    Co2 13 (L) 20 - 33 mmol/L    Glucose 182 (H) 65 - 99 mg/dL    Bun 76 (H) 8 - 22 mg/dL    Creatinine 2.62 (H) 0.50 - 1.40 mg/dL    Calcium 8.4 (L) 8.5 - 10.5 mg/dL    Anion Gap 14.0 7.0 - 16.0   ESTIMATED GFR    Collection Time: 06/08/24 12:08 PM   Result Value Ref Range    GFR (CKD-EPI) 20 (A) >60 mL/min/1.73 m 2   POCT glucose device results    Collection Time: 06/08/24  5:18 PM   Result Value Ref Range    POC Glucose, Blood 221 (H) 65 - 99 mg/dL   POCT glucose device results    Collection Time: 06/08/24  6:31 PM   Result Value Ref Range    POC Glucose, Blood 269 (H) 65 - 99 mg/dL   POCT glucose device results    Collection Time: 06/08/24 10:43 PM   Result Value Ref Range    POC Glucose, Blood 232 (H) 65 - 99 mg/dL   CBC WITHOUT DIFFERENTIAL    Collection Time: 06/09/24  3:15 AM   Result Value Ref Range    WBC 6.1 4.8 - 10.8 K/uL    RBC 3.08 (L) 4.20 - 5.40 M/uL    Hemoglobin 8.1 (L) 12.0 - 16.0 g/dL    Hematocrit 24.7 (L) 37.0 - 47.0 %    MCV 80.2 (L) 81.4 - 97.8 fL    MCH 26.3 (L) 27.0 - 33.0 pg    MCHC 32.8 32.2 - 35.5 g/dL    RDW 54.0 (H) 35.9 - 50.0 fL    Platelet Count 94 (L) 164 - 446 K/uL   Basic Metabolic Panel    Collection Time: 06/09/24  3:15 AM   Result Value Ref Range    Sodium 131 (L) 135 - 145 mmol/L    Potassium 5.5 3.6 - 5.5 mmol/L    Chloride 102 96 - 112 mmol/L    Co2 18 (L) 20 - 33 mmol/L    Glucose 255 (H) 65 - 99 mg/dL    Bun 81 (H) 8 - 22 mg/dL    Creatinine 2.84 (H) 0.50 - 1.40 mg/dL    Calcium 8.3 (L) 8.5 - 10.5 mg/dL    Anion Gap 11.0 7.0 - 16.0   MAGNESIUM    Collection Time: 06/09/24  3:15  AM   Result Value Ref Range    Magnesium 2.0 1.5 - 2.5 mg/dL   PHOSPHORUS    Collection Time: 06/09/24  3:15 AM   Result Value Ref Range    Phosphorus 5.6 (H) 2.5 - 4.5 mg/dL   IMMATURE PLT FRACTION    Collection Time: 06/09/24  3:15 AM   Result Value Ref Range    Imm. Plt Fraction 1.5 0.6 - 13.1 %   ESTIMATED GFR    Collection Time: 06/09/24  3:15 AM   Result Value Ref Range    GFR (CKD-EPI) 18 (A) >60 mL/min/1.73 m 2       (click the triangle to expand results)      ASSESSMENT:  # Oliguric ELIZABETH: In setting of cirrhosis, recent bacteremia/abx and afib with RVR. Cr elevated on admit with active urine sediment. 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 HRS ddx of exclusion and urine sodium not low making less likely.   # Hyponatremia  # Hyperkalemia  # Metabolic acidosis  # ETOH cirrhosis  # Encephalopathy  # Anemia--iron sat recently low  #Thrombocytopenia--likely secondary to liver disease, monitor  # Hypocalcemia  -Received IV supplementation  # Hyperphosphatemia  # Afib with RVR     PLAN:  - No role for RRT yet. She is a very poor long-term dialysis candidate given her liver disease and other comorbidities, and based on palliative conversations hoping to avoid treatments where burden outweighs benefits, dialysis is an inappropriate mdality if required  - Unlikely to tolerate renal biopsy and based thus far treatment most likely supportive  -FU RUSTAM/dsDNA and cryoglobulins  -holding on additional albumin today  -Hold aldactone   -Lasix to 60mg IV for now  -Lokelma BID for now  -bicarb OK but likely not required yet based on most recent ABG and acute nature  -Strict IOs     Discussed with hospitalist       Dorian Rojas MD

## 2024-06-09 NOTE — PROGRESS NOTES
4 Eyes Skin Assessment Completed by Desean RN and DANYEL Levin.    Head Non-Blanching and Redness  Ears Redness and Blanching  Nose WDL  Mouth WDL  Neck Redness and Non-Blanching  Breast/Chest Bruising  Shoulder Blades Redness and Blanching  Spine Redness and Blanching  (R) Arm/Elbow/Hand Redness, Non-Blanching, Bruising, Scab, and Edema  (L) Arm/Elbow/Hand Redness, Blanching, Bruising, and Edema  Abdomen Redness, Blanching, Abrasion, and Bruising  Groin Redness, Blanching, Excoriation, and Scab  Scrotum/Coccyx/Buttocks Redness, Blanching, Excoriation, and Moisture Fissure, foam bandage removed due to saturation of urine and feces.   (R) Leg Redness, Blanching, Scar, Scab, Bruising, and Edema, old skin graft  (L) Leg Redness, Blanching, Non-Blanching, Bruising, and Edema, AKA non-blanching red area on posterior thigh.   (R) Heel/Foot/Toe Redness, Blanching, Discoloration, Boggy, Bruising, and Edema  (L) Heel/Foot/Toe AKA          Devices In Places Tele Box, Pulse Ox, and Nasal Cannula      Interventions In Place Gray Ear Foams, NC W/Ear Foams, Heel Mepilex, TAP System, Pillows, Elbow Mepilex, Q2 Turns, Low Air Loss Mattress, Barrier Cream, Dri-Shalom Pads, and Heels Loaded W/Pillows    Possible Skin Injury Yes    Pictures Uploaded Into Epic Yes  Wound Consult Placed Yes  RN Wound Prevention Protocol Ordered Yes

## 2024-06-09 NOTE — PROGRESS NOTES
Telemetry Shift Summary    Rhythm afib  HR Range   Ectopy Hx PVC/coup  Measurements -/.07/-    Normal Values  Rhythm SR  HR Range:   Measurements: 0.12-0.20/0.06-0.10/0.30-0.52

## 2024-06-09 NOTE — WOUND TEAM
[facility]  Inpatient Services  Initial Wound and Skin Care Evaluation    Admission Date: 6/3/2024     Last order of IP CONSULT TO WOUND CARE was found on 6/9/2024 from Hospital Encounter on 6/3/2024     HPI, PMH, SH: Reviewed    Past Surgical History:   Procedure Laterality Date    KNEE AMPUTATION ABOVE Left 1/16/2024    Procedure: LEFT ABOVE KNEE AMPUTATION;  Surgeon: Obdulio Gray M.D.;  Location: [facility];  Service: Orthopedics    PB TOTAL KNEE ARTHROPLASTY Left 10/30/2023    Procedure: LEFT TOTAL KNEE ARTHROPLASTY EXPLANTATION, INSERTION OF ANTIBIOTIC SPACER, AND APPLICATION OF INCISIONAL WOUND VACUUM;  Surgeon: Wild Luz M.D.;  Location: [facility];  Service: Orthopedics    PB REVISE KNEE JOINT REPLACE,ALL PARTS Left 12/28/2022    Procedure: REVISION, TOTAL ARTHROPLASTY, KNEE, ALL COMPONENTS-LEFT;  Surgeon: Wild Luz M.D.;  Location: [facility];  Service: Orthopedics    IRRIGATION & DEBRIDEMENT GENERAL Left 12/28/2022    Procedure: IRRIGATION AND DEBRIDEMENT, WOUND;  Surgeon: Wild Luz M.D.;  Location: [facility];  Service: Orthopedics    PB REVISE KNEE JOINT REPLACE,ALL PARTS Left 7/19/2022    Procedure: REVISION, TOTAL ARTHROPLASTY, KNEE, ALL COMPONENTS - ANTIBIOTIC SPACER;  Surgeon: Wild Luz M.D.;  Location: [facility];  Service: Orthopedics    IRRIGATION & DEBRIDEMENT GENERAL Left 7/17/2022    Procedure: IRRIGATION AND DEBRIDEMENT, SHOULDER;  Surgeon: Obdulio Gray M.D.;  Location: [facility];  Service: Orthopedics    PB TOTAL KNEE ARTHROPLASTY Left 8/24/2020    Procedure: ARTHROPLASTY, KNEE, TOTAL;  Surgeon: íVctor Faustin M.D.;  Location: [facility];  Service: Orthopedics    KNEE ARTHROPLASTY TOTAL Right 2018    IRRIGATION & DEBRIDEMENT ORTHO Right 9/3/2017    Procedure: IRRIGATION & DEBRIDEMENT ORTHO, POLY EXCHANGE;  Surgeon: Jerome Russell M.D.;  Location: [facility];  Service: Orthopedics    COLONOSCOPY WITH CLIPPING  10/28/2015    Procedure: COLONOSCOPY WITH CLIPPING;  Surgeon: Ruben Colon M.D.;  Location: [facility];  Service:     COLONOSCOPY WITH SCLEROTHERAPY  10/28/2015    Procedure: COLONOSCOPY WITH SCLEROTHERAPY;  Surgeon: Ruben Colon M.D.;  Location: [facility];  Service:     COLONOSCOPY WITH TATTOOING  10/28/2015    Procedure: COLONOSCOPY WITH TATTOOING;  Surgeon: Ruben Colon M.D.;  Location: [facility];  Service:     GYN SURGERY      hysteroscoopy    GYN SURGERY      tubal ligation     Social History     Tobacco Use    Smoking status: Former     Current packs/day: 0.00     Types: Cigarettes     Quit date: 2016     Years since quittin.4    Smokeless tobacco: Former     Quit date: 2021    Tobacco comments:     a few a day when I can   Substance Use Topics    Alcohol use: Not Currently     Comment: hx of AUD     Chief Complaint   Patient presents with    Irregular Heart Beat     Pt jaquan Pace from [place] skilled for tachycardia     Diagnosis: Hyponatremia [E87.1]    Unit where seen by Wound Team: [id number]/[id number]     WOUND CONSULT RELATED TO:  R elbow, sacrum, neck    WOUND TEAM PLAN OF CARE - Frequency of Follow-up:   Nursing to follow dressing orders written for wound care. Contact wound team if area fails to progress, deteriorates or with any questions/concerns if something comes up before next scheduled follow up (See below as to whether wound is following and frequency of wound follow up)   Bi-Monthly - sacrum    WOUND HISTORY:   Pt is a 64yr old female with history of diabetes, A. Fib (on anticoagulation), recent UTI and generalized weakness. Pt is verbal but appears very confused and is having loose BMs frequently. Wound team was asked to assess patients sacrococcygeal area.        WOUND ASSESSMENT/LDA  Moisture Associated Skin Damage 24 Sacrum;Perineum;Groin (Active)   First  Observed Date/First Observed Time: 06/06/24 1600   Present on Original Admission: Yes  Laterality: Bilateral  Wound Location : Sacrum;Perineum;Groin      Assessments 6/9/2024  2:00 PM   Wound Image       NEXT Weekly Photo (Inpatient Only) 06/16/24   Drainage Amount Scant   Drainage Description Serosanguineous   Periwound Assessment Pink;Red   IAD Cleansing Approved Wound Cleanser   Periwound Protectant Barrier Paste   IAD Containment Device Purewick   WOUND NURSE ONLY - Time Spent with Patient (mins) 60                                                     Vascular:    JERO:   No results found.    Lab Values:    Lab Results   Component Value Date/Time    WBC 6.1 06/09/2024 03:15 AM    RBC 3.08 (L) 06/09/2024 03:15 AM    HEMOGLOBIN 8.1 (L) 06/09/2024 03:15 AM    HEMATOCRIT 24.7 (L) 06/09/2024 03:15 AM    CREACTPROT 8.19 (H) 01/12/2024 05:58 AM    SEDRATEWES 23 02/12/2024 07:25 AM    HBA1C 5.3 05/16/2024 07:11 PM       Culture Results show:  No results found for this or any previous visit (from the past 720 hour(s)).    Pain Level/Medicated:  Patient denies pain       INTERVENTIONS BY WOUND TEAM:  Chart and images reviewed. Discussed with bedside RN. All areas of concern (based on picture review, LDA review and discussion with bedside RN) have been thoroughly assessed. Documentation of areas based on significant findings. This RN in to assess patient. Performed standard wound care which includes appropriate positioning, dressing removal and non-selective debridement. Pictures and measurements obtained weekly if/when required.    Wound:  sacral/coccyx area  Cleansed/Non-selectively Debrided with:  Perineal Wipes (Barrier wipes), Wound cleanser, and Moist warm washcloth  Barrier paste    Area Assessed:  Posterior Head  Area intact,     Area Assessed:  Bilateral Ears  Area intact, gray foam in use    Area Assessed:  Neck and Chin  Area intact, redness noted but appears to be decreasing     Area Assessed:  Bilateral  Elbows  Area intact,     Area Assessed: Breasts/Abdomen/Pannus  Area intact,     Area Assessed: Back  Area intact,     Area Assessed:  Bilateral I.T.s  Area intact, barrier paste    Area Assessed:  RLE & Left Residual limb  Area intact, large scar from skin graft to the RLE    Area Assessed:  Right heel  Area intact, floated on 2 pillows Moon boot left outside room, bedside RN to apply at next assessment      Advanced Wound Care Discharge Planning  Number of Clinicians necessary to complete wound care: 1  Is patient requiring IV pain medications for dressing changes:  No   Length of time for dressing change 30 min. (This does not include chart review, pre-medication time, set up, clean up or time spent charting.)    Interdisciplinary consultation: Patient, Bedside RN (Luz), Cyril BORJA (Wound RN).  Pressure injury and staging reviewed with Cyril BORJA (Wound RN).    EVALUATION / RATIONALE FOR TREATMENT:     Date:  06/09/24  Wound Status:  Initial evaluation    Pt with moisture breakdown to sacrococcygeal area. Pt is at high risk for breakdown related to moisture (significant fecal incontinence) and urinary incontinence, confusion, decreased mobility. Barrier paste in use. Wedges used to turn patient. KIMMY bed in use as well. Right heel intact currently floated on pillows but moon boot was left outside of room and bedside RN aware she needs to apply. Wound team to follow bi monthly due to concern for high risk breakdown.         Goals: Steady decrease in wound area and depth weekly.    NURSING PLAN OF CARE ORDERS:  RN Prevention Protocol    NUTRITION RECOMMENDATIONS   Wound Team Recommendations:  N/A    DIET ORDERS (From admission to next 24h)       Start     Ordered    06/08/24 2567  Diet Order Diet: Level 4 - Pureed; Liquid level: Level 0 - Thin; Tray Modifications (optional): SLP - 1:1 Supervision by Nursing, SLP - Deliver to Nursing Station  ALL MEALS        Question Answer Comment   Diet: Level 4 - Pureed    Liquid  level Level 0 - Thin    Tray Modifications (optional) SLP - 1:1 Supervision by Nursing    Tray Modifications (optional) SLP - Deliver to Nursing Station        06/08/24 7810                    PREVENTATIVE INTERVENTIONS:    Q shift Sincere - performed per nursing policy  Q shift pressure point assessments - performed per nursing policy    Surface/Positioning  Low Airloss - Currently in Place  Reposition q 2 hours - Currently in Place  TAPs Turning system - Currently in Place    Offloading/Redistribution  Heel float boots (Prevalon boot) - Ordered  Float Heels off Bed with Pillows - Applied this Visit           Respiratory  Silicone O2 tubing - Currently in Place  Gray Foam Ear protectors - Currently in Place    Containment/Moisture Prevention    Dri-bello pad - Currently in Place  Barrier wipes - Currently in Place  Barrier paste - Currently in Place  Interdry - Currently in Place    Anticipated discharge plans:  TBD        Vac Discharge Needs:  Vac Discharge plan is purely a recommendation from wound team and not a requirement for discharge unless otherwise stated by physician.  Not Applicable Pt not on a wound vac

## 2024-06-10 LAB
ALBUMIN SERPL BCP-MCNC: 2.3 G/DL (ref 3.2–4.9)
ALBUMIN/GLOB SERPL: 0.6 G/DL
ALP SERPL-CCNC: 98 U/L (ref 30–99)
ALT SERPL-CCNC: <5 U/L (ref 2–50)
ANION GAP SERPL CALC-SCNC: 16 MMOL/L (ref 7–16)
AST SERPL-CCNC: 22 U/L (ref 12–45)
BILIRUB SERPL-MCNC: 1 MG/DL (ref 0.1–1.5)
BUN SERPL-MCNC: 87 MG/DL (ref 8–22)
CALCIUM ALBUM COR SERPL-MCNC: 9.3 MG/DL (ref 8.5–10.5)
CALCIUM SERPL-MCNC: 7.9 MG/DL (ref 8.5–10.5)
CHLORIDE SERPL-SCNC: 103 MMOL/L (ref 96–112)
CO2 SERPL-SCNC: 15 MMOL/L (ref 20–33)
CREAT SERPL-MCNC: 2.42 MG/DL (ref 0.5–1.4)
ERYTHROCYTE [DISTWIDTH] IN BLOOD BY AUTOMATED COUNT: 53.6 FL (ref 35.9–50)
GFR SERPLBLD CREATININE-BSD FMLA CKD-EPI: 22 ML/MIN/1.73 M 2
GLOBULIN SER CALC-MCNC: 3.8 G/DL (ref 1.9–3.5)
GLUCOSE BLD STRIP.AUTO-MCNC: 256 MG/DL (ref 65–99)
GLUCOSE BLD STRIP.AUTO-MCNC: 302 MG/DL (ref 65–99)
GLUCOSE BLD STRIP.AUTO-MCNC: 353 MG/DL (ref 65–99)
GLUCOSE BLD STRIP.AUTO-MCNC: 360 MG/DL (ref 65–99)
GLUCOSE BLD STRIP.AUTO-MCNC: 370 MG/DL (ref 65–99)
GLUCOSE SERPL-MCNC: 317 MG/DL (ref 65–99)
HCT VFR BLD AUTO: 24.6 % (ref 37–47)
HGB BLD-MCNC: 8.1 G/DL (ref 12–16)
MAGNESIUM SERPL-MCNC: 2 MG/DL (ref 1.5–2.5)
MCH RBC QN AUTO: 26.4 PG (ref 27–33)
MCHC RBC AUTO-ENTMCNC: 32.9 G/DL (ref 32.2–35.5)
MCV RBC AUTO: 80.1 FL (ref 81.4–97.8)
PLATELET # BLD AUTO: 115 K/UL (ref 164–446)
PMV BLD AUTO: 11.9 FL (ref 9–12.9)
POTASSIUM SERPL-SCNC: 4.8 MMOL/L (ref 3.6–5.5)
PROT SERPL-MCNC: 6.1 G/DL (ref 6–8.2)
RBC # BLD AUTO: 3.07 M/UL (ref 4.2–5.4)
SODIUM SERPL-SCNC: 134 MMOL/L (ref 135–145)
WBC # BLD AUTO: 7.1 K/UL (ref 4.8–10.8)

## 2024-06-10 PROCEDURE — 700102 HCHG RX REV CODE 250 W/ 637 OVERRIDE(OP): Performed by: STUDENT IN AN ORGANIZED HEALTH CARE EDUCATION/TRAINING PROGRAM

## 2024-06-10 PROCEDURE — A9270 NON-COVERED ITEM OR SERVICE: HCPCS | Performed by: STUDENT IN AN ORGANIZED HEALTH CARE EDUCATION/TRAINING PROGRAM

## 2024-06-10 PROCEDURE — 80053 COMPREHEN METABOLIC PANEL: CPT

## 2024-06-10 PROCEDURE — 770020 HCHG ROOM/CARE - TELE (206)

## 2024-06-10 PROCEDURE — 700102 HCHG RX REV CODE 250 W/ 637 OVERRIDE(OP): Performed by: HOSPITALIST

## 2024-06-10 PROCEDURE — A9270 NON-COVERED ITEM OR SERVICE: HCPCS | Performed by: HOSPITALIST

## 2024-06-10 PROCEDURE — 700111 HCHG RX REV CODE 636 W/ 250 OVERRIDE (IP): Mod: JZ | Performed by: INTERNAL MEDICINE

## 2024-06-10 PROCEDURE — 36415 COLL VENOUS BLD VENIPUNCTURE: CPT

## 2024-06-10 PROCEDURE — 82962 GLUCOSE BLOOD TEST: CPT | Mod: 91

## 2024-06-10 PROCEDURE — 85027 COMPLETE CBC AUTOMATED: CPT

## 2024-06-10 PROCEDURE — 302106 OSTOMY POWDER: Performed by: STUDENT IN AN ORGANIZED HEALTH CARE EDUCATION/TRAINING PROGRAM

## 2024-06-10 PROCEDURE — 83735 ASSAY OF MAGNESIUM: CPT

## 2024-06-10 PROCEDURE — 99232 SBSQ HOSP IP/OBS MODERATE 35: CPT | Performed by: STUDENT IN AN ORGANIZED HEALTH CARE EDUCATION/TRAINING PROGRAM

## 2024-06-10 RX ADMIN — INSULIN HUMAN 5 UNITS: 100 INJECTION, SOLUTION PARENTERAL at 22:01

## 2024-06-10 RX ADMIN — ATORVASTATIN CALCIUM 20 MG: 20 TABLET, FILM COATED ORAL at 21:59

## 2024-06-10 RX ADMIN — INSULIN HUMAN 5 UNITS: 100 INJECTION, SOLUTION PARENTERAL at 12:46

## 2024-06-10 RX ADMIN — RIFAXIMIN 550 MG: 550 TABLET ORAL at 16:58

## 2024-06-10 RX ADMIN — APIXABAN 5 MG: 5 TABLET, FILM COATED ORAL at 16:58

## 2024-06-10 RX ADMIN — METOPROLOL TARTRATE 75 MG: 50 TABLET, FILM COATED ORAL at 16:58

## 2024-06-10 RX ADMIN — OMEPRAZOLE 20 MG: 20 CAPSULE, DELAYED RELEASE ORAL at 06:05

## 2024-06-10 RX ADMIN — INSULIN HUMAN 5 UNITS: 100 INJECTION, SOLUTION PARENTERAL at 17:09

## 2024-06-10 RX ADMIN — FUROSEMIDE 60 MG: 10 INJECTION, SOLUTION INTRAVENOUS at 06:05

## 2024-06-10 RX ADMIN — METOPROLOL TARTRATE 75 MG: 50 TABLET, FILM COATED ORAL at 06:04

## 2024-06-10 RX ADMIN — LACTULOSE 30 ML: 20 SOLUTION ORAL at 11:45

## 2024-06-10 RX ADMIN — SODIUM ZIRCONIUM CYCLOSILICATE 10 G: 10 POWDER, FOR SUSPENSION ORAL at 08:03

## 2024-06-10 RX ADMIN — SODIUM BICARBONATE 650 MG: 650 TABLET ORAL at 21:59

## 2024-06-10 RX ADMIN — GABAPENTIN 400 MG: 400 CAPSULE ORAL at 16:58

## 2024-06-10 RX ADMIN — SODIUM BICARBONATE 650 MG: 650 TABLET ORAL at 11:44

## 2024-06-10 RX ADMIN — APIXABAN 5 MG: 5 TABLET, FILM COATED ORAL at 06:04

## 2024-06-10 RX ADMIN — SODIUM BICARBONATE 650 MG: 650 TABLET ORAL at 15:00

## 2024-06-10 RX ADMIN — LACTULOSE 30 ML: 20 SOLUTION ORAL at 06:05

## 2024-06-10 RX ADMIN — RIFAXIMIN 550 MG: 550 TABLET ORAL at 06:04

## 2024-06-10 RX ADMIN — GABAPENTIN 400 MG: 400 CAPSULE ORAL at 06:05

## 2024-06-10 RX ADMIN — INSULIN HUMAN 4 UNITS: 100 INJECTION, SOLUTION PARENTERAL at 08:08

## 2024-06-10 ASSESSMENT — PAIN DESCRIPTION - PAIN TYPE
TYPE: ACUTE PAIN
TYPE: ACUTE PAIN

## 2024-06-10 ASSESSMENT — FIBROSIS 4 INDEX: FIB4 SCORE: 3.15

## 2024-06-10 NOTE — PROGRESS NOTES
"[facility] -  PROGRESS NOTE               Author: Joel Berg M.D. Date & Time: 6/10/2024  10:00 AM     HPI:  64 y.o. female with a past medical history of diabetes, Cirrhosis, atrial fibrillation, recent bacteremia who presented on 6/3/2024 from SNF with weakness, diarrhea, palpitations found to have Afib with RVR. She is currently encephalopathic, unable to participate in interview as such HP per chart review and 3rd person accounts. Initially started on fluids. Then Found to have altered mentation with elevated ammonia and pulm edema prompting dose of lasix. Prolonged RVR with intermittent documented low Bps. Cr 2.9 (as low as 1.3 on 5/25) on admit without significant improvement thus far. Only 320cc UOP documented last 24 hours, UA on 6/4 with large cellular component (WBC/RBC) and proteinuria, concern for UTI so started on abx. Recent course of abx for strep bacteremia/HCAP. Continued on aldactone. Thus far family has wanted to pursue all measures.     DAILY NEPHROLOGY SUMMARY:  6/6: Consult done  6/7: Remains encephalopathic but more alert, unable to participate in interview, Bps improved, 200cc UOP, cr linear  6/8: Ios not documented, cr stable, palliative consult-would not want treatments where burden of treatment outweighs benefits, slightly more alert, tired  6/9: UOP still not documented, more alert, communicating better, notes arm pain  6/10: No new overnight renal events. No labs today.     PAST FAMILY HISTORY: Reviewed and Unchanged  SOCIAL HISTORY: Reviewed and Unchanged  CURRENT MEDICATIONS: Reviewed  IMAGING STUDIES: Reviewed    ROS  10 point ROS perofrmed    PHYSICAL EXAM  VS:  /66   Pulse 88   Temp 36.6 °C (97.9 °F) (Temporal)   Resp 18   Ht 1.702 m (5' 7\")   Wt 100 kg (220 lb 7.4 oz)   LMP 06/02/2008   SpO2 97%   BMI 34.53 kg/m²   GENERAL: Ill appearing  CV: +edema  RESP: non-labored with supplemental O2  GI: Soft  MSK: LLE AKA  SKIN: RLE skin " graft  NEURO: AOx2  PSYCH: Unable to assess    Fluids:  In: 440 [P.O.:440]  Out: -     LABS:  Recent Results (from the past 24 hour(s))   POCT glucose device results    Collection Time: 06/09/24 12:15 PM   Result Value Ref Range    POC Glucose, Blood 256 (H) 65 - 99 mg/dL   Basic Metabolic Panel    Collection Time: 06/09/24  2:25 PM   Result Value Ref Range    Sodium 132 (L) 135 - 145 mmol/L    Potassium 5.0 3.6 - 5.5 mmol/L    Chloride 103 96 - 112 mmol/L    Co2 17 (L) 20 - 33 mmol/L    Glucose 300 (H) 65 - 99 mg/dL    Bun 86 (H) 8 - 22 mg/dL    Creatinine 2.74 (H) 0.50 - 1.40 mg/dL    Calcium 8.2 (L) 8.5 - 10.5 mg/dL    Anion Gap 12.0 7.0 - 16.0   ESTIMATED GFR    Collection Time: 06/09/24  2:25 PM   Result Value Ref Range    GFR (CKD-EPI) 19 (A) >60 mL/min/1.73 m 2   POCT glucose device results    Collection Time: 06/09/24  6:02 PM   Result Value Ref Range    POC Glucose, Blood 266 (H) 65 - 99 mg/dL   POCT glucose device results    Collection Time: 06/09/24  8:53 PM   Result Value Ref Range    POC Glucose, Blood 299 (H) 65 - 99 mg/dL   CBC WITHOUT DIFFERENTIAL    Collection Time: 06/10/24 12:57 AM   Result Value Ref Range    WBC 7.1 4.8 - 10.8 K/uL    RBC 3.07 (L) 4.20 - 5.40 M/uL    Hemoglobin 8.1 (L) 12.0 - 16.0 g/dL    Hematocrit 24.6 (L) 37.0 - 47.0 %    MCV 80.1 (L) 81.4 - 97.8 fL    MCH 26.4 (L) 27.0 - 33.0 pg    MCHC 32.9 32.2 - 35.5 g/dL    RDW 53.6 (H) 35.9 - 50.0 fL    Platelet Count 115 (L) 164 - 446 K/uL    MPV 11.9 9.0 - 12.9 fL   MAGNESIUM    Collection Time: 06/10/24 12:57 AM   Result Value Ref Range    Magnesium 2.0 1.5 - 2.5 mg/dL   POCT glucose device results    Collection Time: 06/10/24  8:05 AM   Result Value Ref Range    POC Glucose, Blood 302 (H) 65 - 99 mg/dL       (click the triangle to expand results)      ASSESSMENT:  # Oliguric ELIZABETH: In setting of cirrhosis, recent bacteremia/abx and afib with RVR. Cr elevated on admit with active urine sediment. Low complements-given hx concern for  post-infectious GN. HRS ddx of exclusion and urine sodium not low making less likely.   # Hyponatremia  # Hyperkalemia  # Metabolic acidosis  # ETOH cirrhosis  # Encephalopathy  # Anemia--iron sat recently low  #Thrombocytopenia--likely secondary to liver disease, monitor  # Hypocalcemia  -Received IV supplementation  # Hyperphosphatemia  # Afib with RVR     PLAN:  - Do not recommend HD if it comes to it. She is a very poor long-term dialysis candidate given her liver disease and other comorbidities, and based on palliative conversations hoping to avoid treatments where burden outweighs benefits, dialysis is an inappropriate modality if required  - Unlikely to tolerate renal biopsy and based thus far treatment most likely supportive  - RUSTAM and dsDNA are not detected. FU cryoglobulins.  - holding on additional albumin today  - Hold aldactone   - Lokelma BID for now  - bicarb OK but likely not required yet based on most recent ABG and acute nature  - Strict IOs   - Daily Labs

## 2024-06-10 NOTE — PROGRESS NOTES
Hospital Medicine Daily Progress Note    Date of Service  6/10/2024    Chief Complaint  April Alicia is a 64 y.o. female admitted 6/3/2024 with diarrhea generalized weakness    Hospital Course  64 y.o. female with a past medical history of elevated BMI of 26, diabetes, alcoholic cirrhosis, atrial fibrillation on eliquis, recent diagnosis of urinary tract infection who presented 6/3/2024 with generalized weakness diarrhea and palpitations.  Patient has not been feeling well over the past 1 day.  She has been progressively worsening confusion generalized weakness after a few episodes of diarrhea.  She was found to have atrial fibrillation with rapid ventricular response.       Chest x-ray showed pulmonary edema so DC'd IV fluid and giving a dose of Lasix.      ELIZABETH -nephro following, recommend against HD given advanced cirrhosis    Interval Problem Update  I saw and examined the patient at bedside  Care plan was discussed and updated with the daughter at the bedside    More alert awake, AO x 4    A-fib with RVR -I increased metoprolol to 75 mg twice daily, IV metoprolol as needed    ESBL E. COLI UTI -I changed Bactrim to fosfomycin given the concerns over hyperkalemia    Hyperkalemia-potassium 4.8, continue Lokelma, decreasing to daily  Continue IV Lasix and Lokelma    I have discussed this patient's plan of care and discharge plan at IDT rounds today with Case Management, Nursing, Nursing leadership, and other members of the IDT team.    Consults   Nephrology   Palliative care    Disposition  The patient is not medically cleared for discharge to home or a post-acute facility.      I have placed the appropriate orders for post-discharge needs.    Review of Systems  Review of Systems   Unable to perform ROS: Medical condition        Physical Exam  Temp:  [36.5 °C (97.7 °F)-36.6 °C (97.9 °F)] 36.6 °C (97.9 °F)  Pulse:  [74-88] 80  Resp:  [16-18] 16  BP: (118-149)/(66-93) 141/93  SpO2:  [97 %-98 %] 98  Patient said that the prednisone was helpful but within a couple of days after he completed it, he said the pain came back.  He is wondering if it would be beneficial to be on a smaller daily dose ?    Sena Sloan XRO/   %    Physical Exam  Vitals and nursing note reviewed.   Constitutional:       General: She is not in acute distress.     Appearance: She is well-developed. She is ill-appearing.      Comments: Very somnolent, not answering questions during my evaluation. Will open her eyes   HENT:      Head: Normocephalic.   Eyes:      Conjunctiva/sclera: Conjunctivae normal.   Cardiovascular:      Rate and Rhythm: Tachycardia present. Rhythm irregular.   Pulmonary:      Effort: Pulmonary effort is normal. No respiratory distress.      Breath sounds: No wheezing.   Abdominal:      General: There is no distension.      Tenderness: There is no abdominal tenderness.   Skin:     General: Skin is warm.      Coloration: Skin is jaundiced.   Neurological:      Mental Status: She is disoriented.   Psychiatric:         Behavior: Behavior is uncooperative.      Comments: Unable to assess         Fluids    Intake/Output Summary (Last 24 hours) at 6/10/2024 1650  Last data filed at 6/9/2024 2334  Gross per 24 hour   Intake 0 ml   Output --   Net 0 ml         Laboratory  Recent Labs     06/08/24  0132 06/09/24  0315 06/10/24  0057   WBC 7.5 6.1 7.1   RBC 3.01* 3.08* 3.07*   HEMOGLOBIN 8.0* 8.1* 8.1*   HEMATOCRIT 24.3* 24.7* 24.6*   MCV 80.7* 80.2* 80.1*   MCH 26.6* 26.3* 26.4*   MCHC 32.9 32.8 32.9   RDW 54.3* 54.0* 53.6*   PLATELETCT 100* 94* 115*   MPV 9.9  --  11.9     Recent Labs     06/09/24  0315 06/09/24  1425 06/10/24  0057   SODIUM 131* 132* 134*   POTASSIUM 5.5 5.0 4.8   CHLORIDE 102 103 103   CO2 18* 17* 15*   GLUCOSE 255* 300* 317*   BUN 81* 86* 87*   CREATININE 2.84* 2.74* 2.42*   CALCIUM 8.3* 8.2* 7.9*                   Imaging  DX-CHEST-PORTABLE (1 VIEW)   Final Result      1.  Mild diffuse pulmonary opacity which may be due to edema and/or infiltrates and is mildly increased since prior.   2.  Cardiomegaly.      DX-CHEST-PORTABLE (1 VIEW)   Final Result         1.  Pulmonary edema and/or infiltrates are identified, which appear  somewhat increased since the prior exam.   2.  Cardiomegaly      CT-HEAD W/O   Final Result      1.  No evidence of acute intracranial process.      2.  Atrophy.         DX-CHEST-PORTABLE (1 VIEW)   Final Result      Cardiomegaly with interstitial edema.           Assessment/Plan  * Acute encephalopathy- (present on admission)  Assessment & Plan  **CT scan of the head did not show evidence for acute infarction or hemorrhage  Per daughter, patient is lucid and cogent at baseline  Daughter feels patient becomes confused when she has a UTI, collected urine which is pending.  UA +, started on rocephin   Now more confused  Ammonia ava due to holding lactulose  Resuming rifaximin and lactulose    Hyperphosphatemia  Assessment & Plan  Due to ELIZABETH  monitor    Hyperkalemia  Assessment & Plan  Persistent hyperkalemia, received multiple treatments of a hyperkalemia  Due to ELIZABETH    6/8 Potassium 6, increase the Lokelma to twice daily  6/9 k 5.5>5  IV Lasix increased to 60 mg daily  Continue Lokelma  Continue telemetry monitor       UTI due to extended-spectrum beta lactamase (ESBL) producing Escherichia coli  Assessment & Plan  discussed with pharmacy, antibiotics changed to meropenem    6/8  -patient developed tongue swelling/facial swelling with meropenem, discontinued.  Received IV Benadryl and steroids.    I discussed with the pharmacy, sensitivity reviewed, antibiotics changed to Bactrim.     6/9 I changed Bactrim to fosfomycin given the concerns of hyperkalemia    ELIZABETH (acute kidney injury) (HCC)- (present on admission)  Assessment & Plan  Mostly due to dehydration secondary to diarrhea and over medication with diuretics    6/9 cre flat 3>3>2.84  Continue IV Lasix, increased to 60 mg daily  Nephrology following  Not a candidate for dialysis  Persistent hyperkalemia due to ELIZABETH  Avoid nephrotoxic agent  Renal dose medications    Metabolic acidosis- (present on admission)  Assessment & Plan  Likely due to reduced intravascular  volume and increase bicarb losses through diarrhea and acute kidney injury  Initially given IV fluids and then these were discontinued after developing volume overload    6/8 I discussed with nephro, started on bicarb p.o.    monitor    Diarrhea- (present on admission)  Assessment & Plan  Differentials include overmedication with lactulose, viral infection, C. difficile colitis  [has been on antibiotics for UTI]  C. difficile initially ordered and pending but unlikely to be this given that patient has not had a bowel movement for a day, so discontinued  Supportive care with intravenous fluids monitor electrolytes and replace accordingly     Dehydration- (present on admission)  Assessment & Plan  Secondary to diarrhea, she is on lactulose  Initially held home diuretics    Hyponatremia- (present on admission)  Assessment & Plan  Likely related to cirrhosis  Avoid rapid or aggressive correction    Anemia- (present on admission)  Assessment & Plan  Appears to be chronic  No evidence of gross bleeding.    Type 2 diabetes mellitus with diabetic neuropathy, unspecified (HCC)- (present on admission)  Assessment & Plan  With no hyperglycemia  Hold metformin while inpatient   ISS    GERD (gastroesophageal reflux disease)- (present on admission)  Assessment & Plan  I will start omeprazole    Hyperlipidemia- (present on admission)  Assessment & Plan  Cardiac diet when able to take orally.  I will start atorvastatin    Paroxysmal atrial fibrillation- (present on admission)  Assessment & Plan  Resume apixaban for stroke prophylaxis  Resume oral metoprolol  As needed Lopressor pushes    6/7 still afib RVR  I increased metoprolol to 50 mg twice daily, IV metoprolol as needed  6/8 still A-fib RVR, I increased the metoprolol to 100 mg twice daily  6/9 HR improving.  BP stable  Continue telemetry monitor    Thrombocytopenia (HCC)- (present on admission)  Assessment & Plan  Plt 100s  Due to cirrhosis  monitor    ACP (advance care  planning)- (present on admission)  Assessment & Plan  6/7 patient was DNR/DNI in the past, then changed it full code during this admission.  Patient admitted for A-fib RVR, encephalopathy, ELIZABETH.  not a candidate for dialysis per nephrology. Nephrology recommended a comfort care. History of alcoholic cirrhosis, diabetes . Poor functional status and prognosis.  The palliative care discussed with the patient's daughter, who discussed with the brother.  I had a lengthy discussion with the daughter and emphasized the poor prognosis of the patient.  However, the daughter and the son decided to continue full resuscitation, including dialysis if possible.  Continue full code.  ACP 16 minutes.       Total time spent in chart review, at bedside with the patient, discussing with nursing and case management: 52 minutes    VTE prophylaxis: apixiban    I have performed a physical exam and reviewed and updated ROS and Plan today (6/10/2024). In review of yesterday's note (6/9/2024), there are no changes except as documented above.    I spent 36 minutes for chart review, meeting and examining the patient, documenting, ordering medications and tests, interpreting the results independently, and communicating with the other health professionals.        VTE Selection

## 2024-06-10 NOTE — WOUND TEAM
Wound consult received regarding pts buttocks. Chart and images reviewed. Pt was seen by this RN and Lavinia SEVILLA Less then 12hrs prior and full head to toe was performed, pt was also evaluated on 6/4/24. No new images in EPIC. Pt is on wound team high risk follow up. Wound consult completed at this time.

## 2024-06-10 NOTE — PROGRESS NOTES
4 Eyes Skin Assessment Completed by DANYEL Anton and DANYEL Stahl.    Head Blanching and Redness  Ears Redness and Blanching  Nose WDL  Mouth WDL  Neck Redness and Blanching  Breast/Chest Redness, Bruising, and Blanching  Shoulder Blades Redness and Blanching  Spine Redness and Blanching  (R) Arm/Elbow/Hand Redness, Non-Blanching, Bruising, Scab, and Edema  (L) Arm/Elbow/Hand Redness, Blanching, Bruising, and Edema  Abdomen Redness, Blanching, and Bruising  Groin Redness, Blanching, Excoriation, Bruising, and Scab  Scrotum/Coccyx/Buttocks Redness, Blanching, Excoriation, and Moisture Fissure  (R) Leg Redness, Blanching, Non-Blanching, Scar, Scab, Bruising, and Edema, skin graft scar  (L) Leg Redness, Blanching, Non-Blanching, Bruising, and Edema  (R) Heel/Foot/Toe Redness, Blanching, Discoloration, Boggy, Bruising, and Edema  (L) Heel/Foot/Toe: AKA          Devices In Places Tele Box, Blood Pressure Cuff, and Pulse Ox      Interventions In Place Gray Ear Foams, NC W/Ear Foams, Heel Mepilex, Heel Float Boots, Waffle Overlay, TAP System, Pillows, Q2 Turns, Barrier Cream, and Heels Loaded W/Pillows    Possible Skin Injury Yes    Pictures Uploaded Into Epic Yes  Wound Consult Placed Yes  RN Wound Prevention Protocol Ordered Yes

## 2024-06-10 NOTE — PROGRESS NOTES
4 Eyes Skin Assessment Completed by DANYEL Escobar and DANYEL Weaver.    Head WDL  Ears WDL  Nose WDL  Mouth WDL  Neck WDL  Breast/Chest WDL  Shoulder Blades WDL  Spine WDL  (R) Arm/Elbow/Hand Bruising  (L) Arm/Elbow/Hand Bruising  Abdomen WDL  Groin Redness, Blanching, and Excoriation  Scrotum/Coccyx/Buttocks Redness, Excoriation, and Moisture Fissure  (R) Leg WDL  (L) Leg WDL  (R) Heel/Foot/Toe WDL  (L) Heel/Foot/Toe WDL          Devices In Places Tele Box and Nasal Cannula      Interventions In Place Gray Ear Foams, TAP System, Pillows, Q2 Turns, Low Air Loss Mattress, Barrier Cream, and Heels Loaded W/Pillows    Possible Skin Injury Yes    Pictures Uploaded Into Epic Yes  Wound Consult Placed Yes  RN Wound Prevention Protocol Ordered Yes

## 2024-06-11 ENCOUNTER — APPOINTMENT (OUTPATIENT)
Dept: PHYSICAL THERAPY | Facility: REHABILITATION | Age: 64
End: 2024-06-11
Attending: INTERNAL MEDICINE
Payer: MEDICAID

## 2024-06-11 VITALS
TEMPERATURE: 97.9 F | WEIGHT: 219.14 LBS | HEART RATE: 77 BPM | RESPIRATION RATE: 18 BRPM | SYSTOLIC BLOOD PRESSURE: 152 MMHG | BODY MASS INDEX: 34.39 KG/M2 | OXYGEN SATURATION: 96 % | HEIGHT: 67 IN | DIASTOLIC BLOOD PRESSURE: 97 MMHG

## 2024-06-11 LAB
ANION GAP SERPL CALC-SCNC: 13 MMOL/L (ref 7–16)
BUN SERPL-MCNC: 88 MG/DL (ref 8–22)
CALCIUM SERPL-MCNC: 7.5 MG/DL (ref 8.5–10.5)
CHLORIDE SERPL-SCNC: 102 MMOL/L (ref 96–112)
CO2 SERPL-SCNC: 17 MMOL/L (ref 20–33)
CREAT SERPL-MCNC: 1.86 MG/DL (ref 0.5–1.4)
ERYTHROCYTE [DISTWIDTH] IN BLOOD BY AUTOMATED COUNT: 54 FL (ref 35.9–50)
GFR SERPLBLD CREATININE-BSD FMLA CKD-EPI: 30 ML/MIN/1.73 M 2
GLUCOSE BLD STRIP.AUTO-MCNC: 340 MG/DL (ref 65–99)
GLUCOSE BLD STRIP.AUTO-MCNC: 346 MG/DL (ref 65–99)
GLUCOSE SERPL-MCNC: 374 MG/DL (ref 65–99)
HCT VFR BLD AUTO: 25.3 % (ref 37–47)
HGB BLD-MCNC: 8.2 G/DL (ref 12–16)
MAGNESIUM SERPL-MCNC: 1.8 MG/DL (ref 1.5–2.5)
MCH RBC QN AUTO: 25.9 PG (ref 27–33)
MCHC RBC AUTO-ENTMCNC: 32.4 G/DL (ref 32.2–35.5)
MCV RBC AUTO: 79.8 FL (ref 81.4–97.8)
PLATELET # BLD AUTO: 91 K/UL (ref 164–446)
PLATELETS.RETICULATED NFR BLD AUTO: 1 % (ref 0.6–13.1)
POTASSIUM SERPL-SCNC: 4.2 MMOL/L (ref 3.6–5.5)
RBC # BLD AUTO: 3.17 M/UL (ref 4.2–5.4)
SODIUM SERPL-SCNC: 132 MMOL/L (ref 135–145)
WBC # BLD AUTO: 5.4 K/UL (ref 4.8–10.8)

## 2024-06-11 PROCEDURE — 80048 BASIC METABOLIC PNL TOTAL CA: CPT

## 2024-06-11 PROCEDURE — 85055 RETICULATED PLATELET ASSAY: CPT

## 2024-06-11 PROCEDURE — A9270 NON-COVERED ITEM OR SERVICE: HCPCS | Performed by: HOSPITALIST

## 2024-06-11 PROCEDURE — A9270 NON-COVERED ITEM OR SERVICE: HCPCS | Performed by: STUDENT IN AN ORGANIZED HEALTH CARE EDUCATION/TRAINING PROGRAM

## 2024-06-11 PROCEDURE — 700102 HCHG RX REV CODE 250 W/ 637 OVERRIDE(OP): Performed by: HOSPITALIST

## 2024-06-11 PROCEDURE — 85027 COMPLETE CBC AUTOMATED: CPT

## 2024-06-11 PROCEDURE — 83735 ASSAY OF MAGNESIUM: CPT

## 2024-06-11 PROCEDURE — 82962 GLUCOSE BLOOD TEST: CPT | Mod: 91

## 2024-06-11 PROCEDURE — 36415 COLL VENOUS BLD VENIPUNCTURE: CPT

## 2024-06-11 PROCEDURE — 700111 HCHG RX REV CODE 636 W/ 250 OVERRIDE (IP): Mod: JZ | Performed by: INTERNAL MEDICINE

## 2024-06-11 PROCEDURE — 99239 HOSP IP/OBS DSCHRG MGMT >30: CPT | Performed by: STUDENT IN AN ORGANIZED HEALTH CARE EDUCATION/TRAINING PROGRAM

## 2024-06-11 PROCEDURE — 700102 HCHG RX REV CODE 250 W/ 637 OVERRIDE(OP): Performed by: STUDENT IN AN ORGANIZED HEALTH CARE EDUCATION/TRAINING PROGRAM

## 2024-06-11 RX ORDER — FUROSEMIDE 40 MG/1
40 TABLET ORAL 2 TIMES DAILY
Status: SHIPPED
Start: 2024-06-11

## 2024-06-11 RX ORDER — METOPROLOL TARTRATE 75 MG/1
75 TABLET, FILM COATED ORAL 2 TIMES DAILY
Status: SHIPPED
Start: 2024-06-11

## 2024-06-11 RX ORDER — OXYCODONE HYDROCHLORIDE 5 MG/1
2.5 TABLET ORAL EVERY 6 HOURS PRN
Qty: 6 TABLET | Refills: 0 | Status: SHIPPED | OUTPATIENT
Start: 2024-06-11 | End: 2024-06-14

## 2024-06-11 RX ADMIN — METOPROLOL TARTRATE 75 MG: 50 TABLET, FILM COATED ORAL at 06:05

## 2024-06-11 RX ADMIN — APIXABAN 5 MG: 5 TABLET, FILM COATED ORAL at 06:05

## 2024-06-11 RX ADMIN — GABAPENTIN 400 MG: 400 CAPSULE ORAL at 06:05

## 2024-06-11 RX ADMIN — OMEPRAZOLE 20 MG: 20 CAPSULE, DELAYED RELEASE ORAL at 06:05

## 2024-06-11 RX ADMIN — LACTULOSE 30 ML: 20 SOLUTION ORAL at 12:06

## 2024-06-11 RX ADMIN — OXYCODONE HYDROCHLORIDE 5 MG: 5 TABLET ORAL at 10:56

## 2024-06-11 RX ADMIN — RIFAXIMIN 550 MG: 550 TABLET ORAL at 06:05

## 2024-06-11 RX ADMIN — FUROSEMIDE 60 MG: 10 INJECTION, SOLUTION INTRAVENOUS at 06:06

## 2024-06-11 RX ADMIN — SODIUM BICARBONATE 650 MG: 650 TABLET ORAL at 08:22

## 2024-06-11 RX ADMIN — INSULIN HUMAN 4 UNITS: 100 INJECTION, SOLUTION PARENTERAL at 06:13

## 2024-06-11 ASSESSMENT — FIBROSIS 4 INDEX: FIB4 SCORE: 7.29

## 2024-06-11 NOTE — PROGRESS NOTES
4 Eyes Skin Assessment Completed by DANYEL Chahal and DANYEL Leyva.    Head WDL  Ears WDL  Nose WDL  Mouth WDL  Neck WDL  Breast/Chest WDL  Shoulder Blades WDL  Spine WDL  (R) Arm/Elbow/Hand Bruising  (L) Arm/Elbow/Hand Bruising  Abdomen Redness, excoriation under pannus  Groin Redness and Excoriation  Scrotum/Coccyx/Buttocks Redness, Excoriation, and Moisture Fissure, abrasion on L labia  (R) Leg healed skin graft  (L) Leg Scab, L AKA  (R) Heel/Foot/Toe Redness and Blanching            Devices In Places Pulse Ox and Nasal Cannula      Interventions In Place NC W/Ear Foams, InterDry, Heel Mepilex, TAP System, Pillows, Q2 Turns, Low Air Loss Mattress, Barrier Cream, and Heels Loaded W/Pillows    Possible Skin Injury Yes    Pictures Uploaded Into Epic Yes  Wound Consult Placed already ordered  RN Wound Prevention Protocol Ordered Yes

## 2024-06-11 NOTE — DISCHARGE PLANNING
DC Transport Scheduled    Transport Company Scheduled:  [employer]  Spoke with Lavinia at [facility] to schedule transport.  [facility] Trip #: [id number]    Scheduled Date: 6/11/2024  Scheduled Time: 1400    Destination: [facility]   Destination address: [address] [place] [id number]    Notified care team of scheduled transport via Voalte.     If there are any changes needed to the DC transportation scheduled, please contact [facility] at ext. [phone] between the hours of 1762-0301 Mon-Fri. If outside those hours, contact the ED Case Manager at ext. [phone].

## 2024-06-11 NOTE — PROGRESS NOTES
Bedside report received from off going RN/tech: Desean, assumed care of patient.     Fall Risk Score: HIGH RISK  Fall risk interventions in place: Place yellow fall risk ID band on patient, Provide patient/family education based on risk assessment, Educate patient/family to call staff for assistance when getting out of bed, Place fall precaution signage outside patient door, Place patient in room close to nursing station, Utilize bed/chair fall alarm, Notify charge of high risk for huddle, and Bed alarm connected correctly  Bed type: Low air loss (Sincere Score less than 17 interventions in place)  Patient on cardiac monitor: Yes  IVF/IV medications: Not Applicable   Oxygen: How many liters 2L  Bedside sitter: Not Applicable   Isolation: Not applicable    Assumed care of patient. A&Ox2, max assist, q2 turns. Pt repositioned in bed and made comfortable. Denies pain and SOB at this time. All needs met at this time will continue to monitor.

## 2024-06-11 NOTE — DISCHARGE PLANNING
Case Management Discharge Planning    Admission Date: 6/3/2024  GMLOS: 3.6  ALOS: 8    6-Clicks ADL Score: 12  6-Clicks Mobility Score: 9  PT and/or OT Eval ordered: Yes  PT/OT:Recommending post acute placement   Post-acute Referrals Ordered: Yes  Post-acute Choice Obtained: Yes  Has referral(s) been sent to post-acute provider:  Yes      Anticipated Discharge Dispo: Discharge Disposition: D/T to SNF with Medicare cert in anticipation of skilled care (03)      Action(s) Taken: Pt discussed in AM IDT rounds that pt is [facility] to DC back to SNF. LMSW requested transport for 1400. Pending transport confirmation.      1024 LMSW went to speak with pt and daughter at bedside. Daughter had request to put a hold on transport and would like to speak with MD. LMSW informed MD that daughter would like to speak with MD.

## 2024-06-11 NOTE — DISCHARGE INSTRUCTIONS
- Follow up with primary care physician in 1 week.      - Please take the medications as instructed     - Go to the local Emergency Department if you have any worsening condition.

## 2024-06-11 NOTE — PROGRESS NOTES
4 Eyes Skin Assessment Completed by DANYEL Sanon and DANYEL Polo.    Head WDL  Ears WDL  Nose WDL  Mouth WDL  Neck WDL  Breast/Chest WDL  Shoulder Blades WDL  Spine WDL  (R) Arm/Elbow/Hand WDL  (L) Arm/Elbow/Hand WDL  Abdomen dryness and excoriation under panis  Groin Redness and Excoration, abrasion to left labia fold  Scrotum/Coccyx/Buttocks Redness and Excoriation, moisture fissure  (R) Leg Scar from old skin graft  (L) Leg AKA, stump scab  (R) Heel/Foot/Toe Redness and Blanching  (L) Heel/Foot/Toe N/A          Devices In Places Tele Box, Pulse Ox, and Nasal Cannula      Interventions In Place Gray Ear Foams, InterDry, Heel Mepilex, TAP System, Pillows, Q2 Turns, Low Air Loss Mattress, and Barrier Cream    Possible Skin Injury Yes    Pictures Uploaded Into Epic Yes  Wound Consult Placed Yes  RN Wound Prevention Protocol Ordered Yes

## 2024-06-11 NOTE — PROGRESS NOTES
Bedside report received from Luz MONTAÑO. Pt A&Ox4. Afib 77 on the monitors. POC discussed with pt. Pt verbalizes understanding. Call light and belongings within reach. Bed locked and in lowest position. Alarm and fall precautions in place.     Fall Risk Score: HIGH RISK  Fall risk interventions in place: Place yellow fall risk ID band on patient, Provide patient/family education based on risk assessment, Educate patient/family to call staff for assistance when getting out of bed, Place fall precaution signage outside patient door, Place patient in room close to nursing station, Utilize bed/chair fall alarm, and Bed alarm connected correctly  Bed type: Regular (Sincere Score less than 17 interventions in place)  Patient on cardiac monitor: Yes  IVF/IV medications: Not Applicable   Oxygen: How many liters 2L  Bedside sitter: Not Applicable   Isolation: Not applicable

## 2024-06-11 NOTE — DISCHARGE SUMMARY
Discharge Summary    CHIEF COMPLAINT ON ADMISSION  Chief Complaint   Patient presents with    Irregular Heart Beat     Pt jaquan Pace from [facility] for tachycardia       Reason for Admission  EMS    Admission Date  6/3/2024     CODE STATUS  Full Code    HPI & HOSPITAL COURSE  64 y.o. female with a past medical history of elevated BMI of 26, diabetes, alcoholic cirrhosis, atrial fibrillation on eliquis, recent diagnosis of urinary tract infection who presented 6/3/2024 with generalized weakness diarrhea and palpitations.  She was found to have atrial fibrillation with rapid ventricular response, hepatic encephalopathy, ELIZABETH. Ammonia 71; EF 52%, ascending aorta is dilated 4cm. proBNP 4762.  chest x-ray showed pulmonary edema .  Patient was edematous, started on IV diuretics.  Lactulose and rifaximin continued.   Patient developed worsening ELIZABETH and persistent hyperkalemia.  Patient received multiple rounds of hyperkalemia treatment. nephrology consulted, recommended against HD given advanced cirrhosis.   ELIZABETH slowly improved.  Creatinine 1.86 at discharge.  Patient is cleared for discharge to by her nephrology.  Will continue p.o. Lasix 40 mg twice daily, continue spironolactone.     Patient was found to ESBL E. Coli UTI.  Completed the course of antibiotics.     patient's mentation improved, AOx4 discharging.     A-fib with RVR -home metoprolol dose increased.  Rate controlled.  Continue Eliquis.      Therefore, she is discharged in good and stable condition to skilled nursing facility.    The patient met 2-midnight criteria for an inpatient stay at the time of discharge.      FOLLOW UP ITEMS POST DISCHARGE  - Follow up with primary care physician in 1 week.     - Please take the medications as instructed    - Go to the local Emergency Department if you have any worsening condition.       DISCHARGE DIAGNOSES  Principal Problem:    Acute encephalopathy (POA: Yes)  Active Problems:    Thrombocytopenia (HCC) (POA:  Yes)    Paroxysmal atrial fibrillation (POA: Yes)    Hyperlipidemia (POA: Yes)    GERD (gastroesophageal reflux disease) (POA: Yes)    Type 2 diabetes mellitus with diabetic neuropathy, unspecified (HCC) (POA: Yes)    Anemia (POA: Yes)    Hyponatremia (POA: Yes)    Dehydration (POA: Yes)    Diarrhea (POA: Yes)    Metabolic acidosis (POA: Yes)    ELIZABETH (acute kidney injury) (HCC) (POA: Yes)    UTI due to extended-spectrum beta lactamase (ESBL) producing Escherichia coli (POA: Unknown)    Hyperkalemia (POA: Unknown)    Hyperphosphatemia (POA: Unknown)    ACP (advance care planning) (POA: Yes)  Resolved Problems:    Acute cystitis without hematuria (POA: Yes)      FOLLOW UP  No future appointments.  [facility] [place]  [address] [address]  [phone]        Anum Gatica M.D.  [address] [id number] [phone]    Follow up in 1 week(s)  Please call your primary care provider to schedule, recent hospitalization follow up      MEDICATIONS ON DISCHARGE     Medication List        START taking these medications        Instructions   insulin GLARGINE 100 UNIT/ML Soln  Commonly known as: Lantus,Semglee   Inject 10 Units under the skin every evening.  Dose: 10 Units     insulin regular 100 Unit/mL Soln  Commonly known as: Humulin R   Doctor's comments: 151 - 200 mg/dL = 1 Units 201 - 250 mg/dL = 3 Units 251 - 300 mg/dL = 5 Units 301 - 350 mg/dL = 7 Units 351 - 400 mg/dL = 8 Units Over 400 mg/dL = 10 Units  Inject 1-6 Units under the skin 4 Times a Day,Before Meals and at Bedtime.  Dose: 1-6 Units            CHANGE how you take these medications        Instructions   acetaminophen 325 MG Tabs  What changed:   when to take this  reasons to take this  Commonly known as: Tylenol   Take 2 Tablets by mouth every 8 hours.  Dose: 650 mg     atorvastatin 20 MG Tabs  What changed: when to take this  Commonly known as: Lipitor   Take 1 Tablet by mouth every evening.  Dose: 20  mg     furosemide 40 MG Tabs  What changed: when to take this  Commonly known as: Lasix   Take 1 Tablet by mouth 2 times a day.  Dose: 40 mg     Metoprolol Tartrate 75 MG Tabs  What changed:   medication strength  how much to take  when to take this  additional instructions   Take 75 mg by mouth 2 times a day.  Dose: 75 mg     oxyCODONE immediate-release 5 MG Tabs  What changed:   how much to take  when to take this  Commonly known as: Roxicodone   Take 0.5 Tablets by mouth every 6 hours as needed for Severe Pain for up to 3 days.  Dose: 2.5 mg     spironolactone 25 MG Tabs  What changed: when to take this  Commonly known as: Aldactone   Take 1 Tablet by mouth every day for 30 days.  Dose: 25 mg            CONTINUE taking these medications        Instructions   albuterol 108 (90 Base) MCG/ACT Aers inhalation aerosol   Inhale 2 Puffs every 4 hours. Every 4-6 hours prn  Dose: 2 Puff     clotrimazole-betamethasone 1-0.05 % Crea  Commonly known as: Lotrisone   Apply 1 Application topically 2 times a day. Apply to right arm rash 2 times daily for 7 days  Dose: 1 Application     Eliquis 5 MG Tabs  Generic drug: apixaban   Take 1 Tablet by mouth 2 times a day. Indications: Thromboembolism secondary to Atrial Fibrillation  Dose: 5 mg     gabapentin 400 MG Caps  Commonly known as: Neurontin   Take 400 mg by mouth 2 times a day.  Dose: 400 mg     lactulose 20 GM/30ML Soln   Take 30 mL by mouth 3 times a day. Hold dose if 4 or more soft or loose stools in the last 24 hours, please gradually increase amount per dose by 5mls if you do not have at least 2 stools per day.  Dose: 30 mL     losartan 100 MG Tabs  Commonly known as: Cozaar   Take 100 mg by mouth every morning.  Dose: 100 mg     nystatin powder  Commonly known as: Mycostatin   Apply 1 Application topically 3 times a day. For 10 days  Dose: 1 Application     ondansetron 4 MG Tbdp  Commonly known as: Zofran ODT   Take 1 Tablet by mouth every four hours as needed for  Nausea/Vomiting (give PO if no IV route available).  Dose: 4 mg     pantoprazole 20 MG tablet  Commonly known as: Protonix   Take 20 mg by mouth every morning.  Dose: 20 mg     riFAXIMin 550 MG Tabs tablet  Commonly known as: Xifaxan   Take 550 mg by mouth 2 times a day.  Dose: 550 mg            STOP taking these medications      metformin 1000 MG tablet  Commonly known as: Glucophage     nitrofurantoin 100 MG Caps  Commonly known as: Macrobid              Allergies  Allergies   Allergen Reactions    Meropenem Swelling     Pt had rxt to meropenem June 2024 (marked tongue swelling and right facial/periorbital edema)    Tuberculin Tests Unspecified     Per MAR from [facility]       DIET  Orders Placed This Encounter   Procedures    Diet Order Diet: Level 4 - Pureed; Liquid level: Level 0 - Thin; Tray Modifications (optional): SLP - 1:1 Supervision by Nursing, SLP - Deliver to Nursing Station     Standing Status:   Standing     Number of Occurrences:   1     Order Specific Question:   Diet:     Answer:   Level 4 - Pureed [25]     Order Specific Question:   Liquid level     Answer:   Level 0 - Thin     Order Specific Question:   Tray Modifications (optional)     Answer:   SLP - 1:1 Supervision by Nursing     Order Specific Question:   Tray Modifications (optional)     Answer:   SLP - Deliver to Nursing Station       ACTIVITY  As tolerated.  Weight bearing as tolerated    LINES, DRAINS, AND WOUNDS  This is an automated list. Peripheral IVs will be removed prior to discharge.  Peripheral IV 06/03/24 22 G Left;Posterior Hand (Active)   Site Assessment Clean;Dry;Intact 06/10/24 2100   Dressing Type Transparent 06/10/24 2100   Line Status Flushed;Scrubbed the hub prior to access;Saline locked 06/10/24 2100   Dressing Status Clean;Dry;Intact 06/10/24 2100   Dressing Intervention N/A 06/10/24 2100   Dressing Change Due 06/08/24 06/03/24 2250   Date Primary Tubing Changed 06/08/24 06/08/24 2100   Date Secondary Tubing Changed  06/08/24 06/08/24 2100   Infiltration Grading ([facility], [facility]) 0 06/10/24 2100   Phlebitis Scale ([facility] Only) 0 06/10/24 2100       Peripheral IV 06/07/24 20 G Anterior;Right Upper arm (Active)   Site Assessment Clean;Dry;Intact 06/10/24 2100   Dressing Type Transparent 06/10/24 2100   Line Status Flushed;Scrubbed the hub prior to access;Saline locked 06/10/24 2100   Dressing Status Clean;Dry;Intact 06/10/24 2100   Dressing Intervention N/A 06/10/24 2100   Date Primary Tubing Changed 06/08/24 06/08/24 2100   Date Secondary Tubing Changed 06/08/24 06/08/24 2100   Infiltration Grading ([facility]) 0 06/10/24 2100   Phlebitis Scale ([facility] Only) 0 06/10/24 2100     External Urinary Catheter (Female Wick) (Active)   Collection Container Suction container 06/06/24 2000      Moisture Associated Skin Damage 06/06/24 Sacrum;Perineum;Groin (Active)   Wound Image     06/09/24 1400   NEXT Weekly Photo (Inpatient Only) 06/16/24 06/09/24 1400   Drainage Amount Scant 06/11/24 0800   Drainage Description Serosanguineous 06/11/24 0800   Periwound Assessment Red;Excoriated 06/11/24 0800   IAD Cleansing Foam Cleanser/Washcloth 06/11/24 0800   Periwound Protectant Barrier Paste 06/11/24 0800   IAD Containment Device Purewick 06/09/24 1400   WOUND NURSE ONLY - Time Spent with Patient (mins) 60 06/09/24 1400       Peripheral IV 06/03/24 22 G Left;Posterior Hand (Active)   Site Assessment Clean;Dry;Intact 06/10/24 2100   Dressing Type Transparent 06/10/24 2100   Line Status Flushed;Scrubbed the hub prior to access;Saline locked 06/10/24 2100   Dressing Status Clean;Dry;Intact 06/10/24 2100   Dressing Intervention N/A 06/10/24 2100   Dressing Change Due 06/08/24 06/03/24 2250   Date Primary Tubing Changed 06/08/24 06/08/24 2100   Date Secondary Tubing Changed 06/08/24 06/08/24 2100   Infiltration Grading ([facility]) 0 06/10/24 2100   Phlebitis Scale ([facility] Only) 0 06/10/24 2100       Peripheral IV 06/07/24 20 G Anterior;Right Upper arm  (Active)   Site Assessment Clean;Dry;Intact 06/10/24 2100   Dressing Type Transparent 06/10/24 2100   Line Status Flushed;Scrubbed the hub prior to access;Saline locked 06/10/24 2100   Dressing Status Clean;Dry;Intact 06/10/24 2100   Dressing Intervention N/A 06/10/24 2100   Date Primary Tubing Changed 06/08/24 06/08/24 2100   Date Secondary Tubing Changed 06/08/24 06/08/24 2100   Infiltration Grading ([facility]) 0 06/10/24 2100   Phlebitis Scale ([facility] Only) 0 06/10/24 2100               MENTAL STATUS ON TRANSFER             CONSULTATIONS      PROCEDURES      LABORATORY  Lab Results   Component Value Date    SODIUM 132 (L) 06/11/2024    POTASSIUM 4.2 06/11/2024    CHLORIDE 102 06/11/2024    CO2 17 (L) 06/11/2024    GLUCOSE 374 (H) 06/11/2024    BUN 88 (H) 06/11/2024    CREATININE 1.86 (H) 06/11/2024    CREATININE 0.9 03/12/2009        Lab Results   Component Value Date    WBC 5.4 06/11/2024    HEMOGLOBIN 8.2 (L) 06/11/2024    HEMATOCRIT 25.3 (L) 06/11/2024    PLATELETCT 91 (L) 06/11/2024        Total time of the discharge process exceeds 35 minutes.

## 2024-06-11 NOTE — PROGRESS NOTES
Attempted to call report to [facility] for patient discharge. Call back number provided to their unit secretary.

## 2024-06-11 NOTE — CARE PLAN
The patient is Watcher - Medium risk of patient condition declining or worsening    Shift Goals  Clinical Goals: maintain skin integrity, monitor labs  Patient Goals: Comfort  Family Goals: maintain skin integrity    Progress made toward(s) clinical / shift goals:    Problem: Skin Integrity  Goal: Skin integrity is maintained or improved  Outcome: Progressing  Note: Assess skin and monitor for skin breakdown. Alleviate pressure to bony prominences and provide assistance with turning, repositioning, ROM and mobility as appropriate. Use of q2 turns, pillows, KIMMY, mepilex, barrier cream, antifungal powder, as needed.      Problem: Respiratory  Goal: Patient will achieve/maintain optimum respiratory ventilation and gas exchange  Outcome: Progressing  Note: Assess and monitor pulmonary status. Adjust oxygen as needed to maintain adequate saturation. Pt back at O2 baseline, 2L NC         Patient is not progressing towards the following goals:

## 2024-06-13 LAB — CRYOGLOB SER QL 3D COLD INC: NORMAL

## 2024-06-18 ENCOUNTER — APPOINTMENT (OUTPATIENT)
Dept: PHYSICAL THERAPY | Facility: REHABILITATION | Age: 64
End: 2024-06-18
Attending: INTERNAL MEDICINE
Payer: MEDICAID

## 2024-06-25 ENCOUNTER — APPOINTMENT (OUTPATIENT)
Dept: PHYSICAL THERAPY | Facility: REHABILITATION | Age: 64
End: 2024-06-25
Attending: INTERNAL MEDICINE
Payer: MEDICAID

## 2024-08-18 ENCOUNTER — HOSPITAL ENCOUNTER (INPATIENT)
Facility: MEDICAL CENTER | Age: 64
LOS: 1 days | DRG: 917 | End: 2024-08-20
Attending: EMERGENCY MEDICINE | Admitting: STUDENT IN AN ORGANIZED HEALTH CARE EDUCATION/TRAINING PROGRAM
Payer: MEDICAID

## 2024-08-18 DIAGNOSIS — T40.601A NARCOTIC OVERDOSE, ACCIDENTAL OR UNINTENTIONAL, INITIAL ENCOUNTER (HCC): ICD-10-CM

## 2024-08-18 DIAGNOSIS — E87.20 LACTIC ACIDOSIS: ICD-10-CM
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
## 2024-08-18 DIAGNOSIS — R09.02 HYPOXIA: ICD-10-CM

## 2024-08-18 DIAGNOSIS — K76.82 ACUTE HEPATIC ENCEPHALOPATHY (HCC): ICD-10-CM

## 2024-08-18 PROCEDURE — 99285 EMERGENCY DEPT VISIT HI MDM: CPT

## 2024-08-18 ASSESSMENT — PAIN DESCRIPTION - PAIN TYPE: TYPE: ACUTE PAIN

## 2024-08-18 ASSESSMENT — FIBROSIS 4 INDEX: FIB4 SCORE: 7.29

## 2024-08-19 ENCOUNTER — APPOINTMENT (OUTPATIENT)
Dept: RADIOLOGY | Facility: MEDICAL CENTER | Age: 64
DRG: 917 | End: 2024-08-19
Attending: EMERGENCY MEDICINE
Payer: MEDICAID

## 2024-08-19 ENCOUNTER — HOSPITAL ENCOUNTER (OUTPATIENT)
Dept: RADIOLOGY | Facility: MEDICAL CENTER | Age: 64
End: 2024-08-19
Attending: EMERGENCY MEDICINE

## 2024-08-19 PROBLEM — F11.10 NARCOTIC ABUSE (HCC): Status: ACTIVE | Noted: 2024-08-19

## 2024-08-19 PROBLEM — F11.20 NARCOTIC DEPENDENCE (HCC): Status: ACTIVE | Noted: 2024-08-19

## 2024-08-19 PROBLEM — I48.20 CHRONIC ATRIAL FIBRILLATION (HCC): Status: ACTIVE | Noted: 2024-08-19

## 2024-08-19 LAB
ALBUMIN SERPL BCP-MCNC: 2.7 G/DL (ref 3.2–4.9)
ALBUMIN/GLOB SERPL: 0.6 G/DL
ALP SERPL-CCNC: 128 U/L (ref 30–99)
ALT SERPL-CCNC: 5 U/L (ref 2–50)
AMMONIA PLAS-SCNC: 93 UMOL/L (ref 11–45)
AMPHET UR QL SCN: NEGATIVE
ANION GAP SERPL CALC-SCNC: 11 MMOL/L (ref 7–16)
APPEARANCE UR: CLEAR
APTT PPP: 46.4 SEC (ref 24.7–36)
AST SERPL-CCNC: 17 U/L (ref 12–45)
BACTERIA #/AREA URNS HPF: ABNORMAL /HPF
BARBITURATES UR QL SCN: NEGATIVE
BASOPHILS # BLD AUTO: 1.5 % (ref 0–1.8)
BASOPHILS # BLD: 0.06 K/UL (ref 0–0.12)
BENZODIAZ UR QL SCN: NEGATIVE
BILIRUB SERPL-MCNC: 1.1 MG/DL (ref 0.1–1.5)
BILIRUB UR QL STRIP.AUTO: NEGATIVE
BUN SERPL-MCNC: 26 MG/DL (ref 8–22)
BZE UR QL SCN: NEGATIVE
CALCIUM ALBUM COR SERPL-MCNC: 9.3 MG/DL (ref 8.5–10.5)
CALCIUM SERPL-MCNC: 8.3 MG/DL (ref 8.5–10.5)
CANNABINOIDS UR QL SCN: NEGATIVE
CHLORIDE SERPL-SCNC: 108 MMOL/L (ref 96–112)
CO2 SERPL-SCNC: 14 MMOL/L (ref 20–33)
COLOR UR: YELLOW
CREAT SERPL-MCNC: 1 MG/DL (ref 0.5–1.4)
EKG IMPRESSION: NORMAL
EOSINOPHIL # BLD AUTO: 0.16 K/UL (ref 0–0.51)
EOSINOPHIL NFR BLD: 4 % (ref 0–6.9)
EPI CELLS #/AREA URNS HPF: ABNORMAL /HPF
ERYTHROCYTE [DISTWIDTH] IN BLOOD BY AUTOMATED COUNT: 56.7 FL (ref 35.9–50)
ETHANOL BLD-MCNC: <10.1 MG/DL
FENTANYL UR QL: NEGATIVE
GFR SERPLBLD CREATININE-BSD FMLA CKD-EPI: 63 ML/MIN/1.73 M 2
GLOBULIN SER CALC-MCNC: 4.6 G/DL (ref 1.9–3.5)
GLUCOSE BLD STRIP.AUTO-MCNC: 119 MG/DL (ref 65–99)
GLUCOSE BLD STRIP.AUTO-MCNC: 123 MG/DL (ref 65–99)
GLUCOSE BLD STRIP.AUTO-MCNC: 149 MG/DL (ref 65–99)
GLUCOSE BLD STRIP.AUTO-MCNC: 96 MG/DL (ref 65–99)
GLUCOSE SERPL-MCNC: 103 MG/DL (ref 65–99)
GLUCOSE UR STRIP.AUTO-MCNC: NEGATIVE MG/DL
HCT VFR BLD AUTO: 25.4 % (ref 37–47)
HGB BLD-MCNC: 7.8 G/DL (ref 12–16)
HYALINE CASTS #/AREA URNS LPF: ABNORMAL /LPF
IMM GRANULOCYTES # BLD AUTO: 0.01 K/UL (ref 0–0.11)
IMM GRANULOCYTES NFR BLD AUTO: 0.2 % (ref 0–0.9)
INR PPP: 2.46 (ref 0.87–1.13)
KETONES UR STRIP.AUTO-MCNC: NEGATIVE MG/DL
LACTATE SERPL-SCNC: 2.6 MMOL/L (ref 0.5–2)
LEUKOCYTE ESTERASE UR QL STRIP.AUTO: ABNORMAL
LYMPHOCYTES # BLD AUTO: 0.91 K/UL (ref 1–4.8)
LYMPHOCYTES NFR BLD: 22.6 % (ref 22–41)
MAGNESIUM SERPL-MCNC: 1.4 MG/DL (ref 1.5–2.5)
MCH RBC QN AUTO: 27 PG (ref 27–33)
MCHC RBC AUTO-ENTMCNC: 30.7 G/DL (ref 32.2–35.5)
MCV RBC AUTO: 87.9 FL (ref 81.4–97.8)
METHADONE UR QL SCN: NEGATIVE
MICRO URNS: ABNORMAL
MONOCYTES # BLD AUTO: 0.45 K/UL (ref 0–0.85)
MONOCYTES NFR BLD AUTO: 11.2 % (ref 0–13.4)
NEUTROPHILS # BLD AUTO: 2.43 K/UL (ref 1.82–7.42)
NEUTROPHILS NFR BLD: 60.5 % (ref 44–72)
NITRITE UR QL STRIP.AUTO: NEGATIVE
NRBC # BLD AUTO: 0 K/UL
NRBC BLD-RTO: 0 /100 WBC (ref 0–0.2)
NT-PROBNP SERPL IA-MCNC: 2712 PG/ML (ref 0–125)
OPIATES UR QL SCN: NEGATIVE
OXYCODONE UR QL SCN: POSITIVE
PCP UR QL SCN: NEGATIVE
PH UR STRIP.AUTO: 6.5 [PH] (ref 5–8)
PLATELET # BLD AUTO: 104 K/UL (ref 164–446)
PMV BLD AUTO: 9.1 FL (ref 9–12.9)
POTASSIUM SERPL-SCNC: 4 MMOL/L (ref 3.6–5.5)
PROPOXYPH UR QL SCN: NEGATIVE
PROT SERPL-MCNC: 7.3 G/DL (ref 6–8.2)
PROT UR QL STRIP: NEGATIVE MG/DL
PROTHROMBIN TIME: 27 SEC (ref 12–14.6)
RBC # BLD AUTO: 2.89 M/UL (ref 4.2–5.4)
RBC # URNS HPF: ABNORMAL /HPF
RBC UR QL AUTO: ABNORMAL
SODIUM SERPL-SCNC: 133 MMOL/L (ref 135–145)
SP GR UR STRIP.AUTO: <=1.005
TROPONIN T SERPL-MCNC: 26 NG/L (ref 6–19)
UROBILINOGEN UR STRIP.AUTO-MCNC: 0.2 MG/DL
WBC # BLD AUTO: 4 K/UL (ref 4.8–10.8)
WBC #/AREA URNS HPF: ABNORMAL /HPF

## 2024-08-19 PROCEDURE — 83605 ASSAY OF LACTIC ACID: CPT

## 2024-08-19 PROCEDURE — 83880 ASSAY OF NATRIURETIC PEPTIDE: CPT

## 2024-08-19 PROCEDURE — 85025 COMPLETE CBC W/AUTO DIFF WBC: CPT

## 2024-08-19 PROCEDURE — 700102 HCHG RX REV CODE 250 W/ 637 OVERRIDE(OP)

## 2024-08-19 PROCEDURE — 99232 SBSQ HOSP IP/OBS MODERATE 35: CPT | Performed by: HOSPITALIST

## 2024-08-19 PROCEDURE — 770020 HCHG ROOM/CARE - TELE (206)

## 2024-08-19 PROCEDURE — 82962 GLUCOSE BLOOD TEST: CPT

## 2024-08-19 PROCEDURE — 99291 CRITICAL CARE FIRST HOUR: CPT | Mod: GC | Performed by: STUDENT IN AN ORGANIZED HEALTH CARE EDUCATION/TRAINING PROGRAM

## 2024-08-19 PROCEDURE — 71045 X-RAY EXAM CHEST 1 VIEW: CPT

## 2024-08-19 PROCEDURE — 85610 PROTHROMBIN TIME: CPT

## 2024-08-19 PROCEDURE — 81001 URINALYSIS AUTO W/SCOPE: CPT

## 2024-08-19 PROCEDURE — 700105 HCHG RX REV CODE 258: Mod: UD | Performed by: EMERGENCY MEDICINE

## 2024-08-19 PROCEDURE — 70450 CT HEAD/BRAIN W/O DYE: CPT

## 2024-08-19 PROCEDURE — 700111 HCHG RX REV CODE 636 W/ 250 OVERRIDE (IP): Performed by: HOSPITALIST

## 2024-08-19 PROCEDURE — 80307 DRUG TEST PRSMV CHEM ANLYZR: CPT

## 2024-08-19 PROCEDURE — 93005 ELECTROCARDIOGRAM TRACING: CPT | Performed by: EMERGENCY MEDICINE

## 2024-08-19 PROCEDURE — 83735 ASSAY OF MAGNESIUM: CPT

## 2024-08-19 PROCEDURE — 85730 THROMBOPLASTIN TIME PARTIAL: CPT

## 2024-08-19 PROCEDURE — 84484 ASSAY OF TROPONIN QUANT: CPT

## 2024-08-19 PROCEDURE — 72125 CT NECK SPINE W/O DYE: CPT

## 2024-08-19 PROCEDURE — 82140 ASSAY OF AMMONIA: CPT

## 2024-08-19 PROCEDURE — A9270 NON-COVERED ITEM OR SERVICE: HCPCS

## 2024-08-19 PROCEDURE — 80053 COMPREHEN METABOLIC PANEL: CPT

## 2024-08-19 PROCEDURE — 82077 ASSAY SPEC XCP UR&BREATH IA: CPT

## 2024-08-19 PROCEDURE — 36415 COLL VENOUS BLD VENIPUNCTURE: CPT

## 2024-08-19 RX ORDER — GABAPENTIN 400 MG/1
400 CAPSULE ORAL 2 TIMES DAILY
Status: DISCONTINUED | OUTPATIENT
Start: 2024-08-19 | End: 2024-08-20 | Stop reason: HOSPADM

## 2024-08-19 RX ORDER — NYSTATIN 100000 [USP'U]/G
1 POWDER TOPICAL 3 TIMES DAILY
Status: DISCONTINUED | OUTPATIENT
Start: 2024-08-19 | End: 2024-08-20 | Stop reason: HOSPADM

## 2024-08-19 RX ORDER — SODIUM CHLORIDE 9 MG/ML
500 INJECTION, SOLUTION INTRAVENOUS ONCE
Status: COMPLETED | OUTPATIENT
Start: 2024-08-19 | End: 2024-08-19

## 2024-08-19 RX ORDER — SODIUM CHLORIDE, SODIUM LACTATE, POTASSIUM CHLORIDE, CALCIUM CHLORIDE 600; 310; 30; 20 MG/100ML; MG/100ML; MG/100ML; MG/100ML
INJECTION, SOLUTION INTRAVENOUS CONTINUOUS
Status: DISCONTINUED | OUTPATIENT
Start: 2024-08-19 | End: 2024-08-19

## 2024-08-19 RX ORDER — LACTULOSE 20 G/30ML
30 SOLUTION ORAL 3 TIMES DAILY
Status: DISCONTINUED | OUTPATIENT
Start: 2024-08-19 | End: 2024-08-19

## 2024-08-19 RX ORDER — NALOXONE HYDROCHLORIDE 1 MG/ML
2 INJECTION INTRAMUSCULAR; INTRAVENOUS; SUBCUTANEOUS ONCE
Status: DISCONTINUED | OUTPATIENT
Start: 2024-08-19 | End: 2024-08-19

## 2024-08-19 RX ORDER — SODIUM CHLORIDE, SODIUM LACTATE, POTASSIUM CHLORIDE, AND CALCIUM CHLORIDE .6; .31; .03; .02 G/100ML; G/100ML; G/100ML; G/100ML
500 INJECTION, SOLUTION INTRAVENOUS ONCE
Status: DISCONTINUED | OUTPATIENT
Start: 2024-08-19 | End: 2024-08-19

## 2024-08-19 RX ORDER — ATORVASTATIN CALCIUM 20 MG/1
20 TABLET, FILM COATED ORAL NIGHTLY
Status: DISCONTINUED | OUTPATIENT
Start: 2024-08-19 | End: 2024-08-20 | Stop reason: HOSPADM

## 2024-08-19 RX ORDER — LOSARTAN POTASSIUM 50 MG/1
100 TABLET ORAL EVERY MORNING
Status: DISCONTINUED | OUTPATIENT
Start: 2024-08-19 | End: 2024-08-20 | Stop reason: HOSPADM

## 2024-08-19 RX ORDER — ALBUTEROL SULFATE 90 UG/1
2 AEROSOL, METERED RESPIRATORY (INHALATION)
Status: DISCONTINUED | OUTPATIENT
Start: 2024-08-19 | End: 2024-08-20 | Stop reason: HOSPADM

## 2024-08-19 RX ORDER — LACTULOSE 20 G/30ML
15 SOLUTION ORAL 3 TIMES DAILY
Status: DISCONTINUED | OUTPATIENT
Start: 2024-08-19 | End: 2024-08-20 | Stop reason: HOSPADM

## 2024-08-19 RX ORDER — ALBUTEROL SULFATE 90 UG/1
2 AEROSOL, METERED RESPIRATORY (INHALATION) EVERY 4 HOURS
Status: DISCONTINUED | OUTPATIENT
Start: 2024-08-19 | End: 2024-08-19

## 2024-08-19 RX ORDER — MAGNESIUM SULFATE HEPTAHYDRATE 40 MG/ML
4 INJECTION, SOLUTION INTRAVENOUS ONCE
Status: COMPLETED | OUTPATIENT
Start: 2024-08-19 | End: 2024-08-19

## 2024-08-19 RX ORDER — METOPROLOL TARTRATE 25 MG/1
75 TABLET, FILM COATED ORAL 2 TIMES DAILY
Status: DISCONTINUED | OUTPATIENT
Start: 2024-08-19 | End: 2024-08-20 | Stop reason: HOSPADM

## 2024-08-19 RX ORDER — LACTULOSE 20 G/30ML
30 SOLUTION ORAL ONCE
Status: DISCONTINUED | OUTPATIENT
Start: 2024-08-19 | End: 2024-08-19

## 2024-08-19 RX ORDER — DEXTROSE MONOHYDRATE 25 G/50ML
25 INJECTION, SOLUTION INTRAVENOUS
Status: DISCONTINUED | OUTPATIENT
Start: 2024-08-19 | End: 2024-08-20 | Stop reason: HOSPADM

## 2024-08-19 RX ADMIN — LACTULOSE 30 ML: 10 SOLUTION ORAL at 07:01

## 2024-08-19 RX ADMIN — SODIUM CHLORIDE 500 ML: 9 INJECTION, SOLUTION INTRAVENOUS at 02:16

## 2024-08-19 RX ADMIN — MAGNESIUM SULFATE HEPTAHYDRATE 4 G: 4 INJECTION, SOLUTION INTRAVENOUS at 08:14

## 2024-08-19 RX ADMIN — LOSARTAN POTASSIUM 100 MG: 50 TABLET, FILM COATED ORAL at 07:01

## 2024-08-19 RX ADMIN — METOPROLOL TARTRATE 75 MG: 25 TABLET, FILM COATED ORAL at 17:25

## 2024-08-19 RX ADMIN — GABAPENTIN 400 MG: 400 CAPSULE ORAL at 17:25

## 2024-08-19 RX ADMIN — ATORVASTATIN CALCIUM 20 MG: 20 TABLET, FILM COATED ORAL at 20:45

## 2024-08-19 RX ADMIN — APIXABAN 5 MG: 5 TABLET, FILM COATED ORAL at 10:29

## 2024-08-19 RX ADMIN — LACTULOSE 30 ML: 10 SOLUTION ORAL at 11:56

## 2024-08-19 RX ADMIN — APIXABAN 5 MG: 5 TABLET, FILM COATED ORAL at 17:25

## 2024-08-19 RX ADMIN — METOPROLOL TARTRATE 75 MG: 25 TABLET, FILM COATED ORAL at 07:01

## 2024-08-19 RX ADMIN — NYSTATIN 100000 UNITS: 100000 POWDER TOPICAL at 17:28

## 2024-08-19 RX ADMIN — RIFAXIMIN 550 MG: 550 TABLET ORAL at 07:01

## 2024-08-19 RX ADMIN — RIFAXIMIN 550 MG: 550 TABLET ORAL at 17:25

## 2024-08-19 RX ADMIN — NYSTATIN 100000 UNITS: 100000 POWDER TOPICAL at 11:56

## 2024-08-19 RX ADMIN — OMEPRAZOLE 20 MG: 20 CAPSULE, DELAYED RELEASE ORAL at 10:29

## 2024-08-19 RX ADMIN — GABAPENTIN 400 MG: 400 CAPSULE ORAL at 10:29

## 2024-08-19 ASSESSMENT — PAIN DESCRIPTION - PAIN TYPE
TYPE: ACUTE PAIN

## 2024-08-19 ASSESSMENT — FIBROSIS 4 INDEX: FIB4 SCORE: 4.68

## 2024-08-19 ASSESSMENT — ENCOUNTER SYMPTOMS: SHORTNESS OF BREATH: 0
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
## 2024-08-19 NOTE — ASSESSMENT & PLAN NOTE
Patient on glargine 10 units nightly, and supplemental humulin.   Continue glargine 5 units nightly, supplemented by sliding scale insulin.

## 2024-08-19 NOTE — ED NOTES
Patient complaining of being wet and wanted her pull-up changed; cleaned due to incontinence; diaper and purewick applied to patient; tolerated well

## 2024-08-19 NOTE — ED TRIAGE NOTES
"64 y.o. female [facility]    Chief Complaint   Patient presents with    GLF    ALOC   BIB [place] to [facility]  Picked up from Home  EMS called by Significant other    Patient presented to ED with complaint of unwitnessed GLF, patient was sitting on the commode and slipped. Significant other found patient; half way on the floor. As per EMS patient was hypoxic with spo2 84% and had pinpoint pupils, they administered narcan. Patient does take oxycodone for chronic pain. Patient AAO X2, Respirations even and unlabored on 2 Liters o2, afebrile at this time. FSBG by  mg/dl    Head strike/ Injury Unknown    Blood thinner/ ASA  Negative    LOC Unknown    Event Amnesia Positive     Nausea / vomiting  Negative      ED RN protocol initiated for ALOC      Medications given en route: Narcan 0.4 mg    BP (!) 141/84   Pulse 98   Temp 36.8 °C (98.2 °F) (Temporal)   Resp 17   Ht 1.737 m (5' 8.4\")   Wt 99 kg (218 lb 4.1 oz)   LMP 06/02/2008   SpO2 99%   BMI 32.80 kg/m²     Past Medical History:   Diagnosis Date    Abnormal finding of lung 12/27/2022    Acute exacerbation of chronic obstructive pulmonary disease (COPD) (HCC) 01/27/2020    Alcoholic cirrhosis (HCC)     Arthritis     OA in knees    ASTHMA     Atrial fibrillation (HCC)     Dry eyes 11/16/2017    Edentulous     upper and lower dentures    Emphysema of lung (HCC)     H/O: hysterectomy 12/05/2019    Heart burn     left knee    Hepatic encephalopathy (HCC) 12/27/2022    Hepatitis C infection 07/28/2022    History of rheumatic fever 09/04/2017    Hypertension     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HCC)     Recurrent, L knee, x4 with surgical washout    Medication overuse headache 08/15/2017    Mitral regurgitation     Pancytopenia (HCC) 07/26/2022    Pneumonia 02/2020    Primary osteoarthritis of left knee 08/25/2020    Prosthetic joint infection (HCC) 2018    Right knee after total knee    Protein-calorie malnutrition, severe (HCC) 08/01/2022    Right " leg DVT ([facility]) 2018    acute, resolved    Seizure disorder ([facility])     Septic arthritis of knee, left ([facility]) 07/19/2022    Septic arthritis of shoulder, left ([facility]) 07/17/2022    Septic shock due to Pseudomonas species ([facility]) 10/30/2023    Type 2 diabetes mellitus ([facility]) 12/27/2022       Past Surgical History:   Procedure Laterality Date    KNEE AMPUTATION ABOVE Left 1/16/2024    Procedure: LEFT ABOVE KNEE AMPUTATION;  Surgeon: Obdulio Gray M.D.;  Location: [facility];  Service: Orthopedics    PB TOTAL KNEE ARTHROPLASTY Left 10/30/2023    Procedure: LEFT TOTAL KNEE ARTHROPLASTY EXPLANTATION, INSERTION OF ANTIBIOTIC SPACER, AND APPLICATION OF INCISIONAL WOUND VACUUM;  Surgeon: Wild Luz M.D.;  Location: [facility];  Service: Orthopedics    PB REVISE KNEE JOINT REPLACE,ALL PARTS Left 12/28/2022    Procedure: REVISION, TOTAL ARTHROPLASTY, KNEE, ALL COMPONENTS-LEFT;  Surgeon: Wild Luz M.D.;  Location: [facility];  Service: Orthopedics    IRRIGATION & DEBRIDEMENT GENERAL Left 12/28/2022    Procedure: IRRIGATION AND DEBRIDEMENT, WOUND;  Surgeon: Wild Luz M.D.;  Location: [facility];  Service: Orthopedics    PB REVISE KNEE JOINT REPLACE,ALL PARTS Left 7/19/2022    Procedure: REVISION, TOTAL ARTHROPLASTY, KNEE, ALL COMPONENTS - ANTIBIOTIC SPACER;  Surgeon: Wild Luz M.D.;  Location: [facility];  Service: Orthopedics    IRRIGATION & DEBRIDEMENT GENERAL Left 7/17/2022    Procedure: IRRIGATION AND DEBRIDEMENT, SHOULDER;  Surgeon: Obdulio Gray M.D.;  Location: [facility];  Service: Orthopedics    PB TOTAL KNEE ARTHROPLASTY Left 8/24/2020    Procedure: ARTHROPLASTY, KNEE, TOTAL;  Surgeon: Víctor Faustin M.D.;  Location: [facility];  Service: Orthopedics    KNEE ARTHROPLASTY TOTAL Right 2018    IRRIGATION & DEBRIDEMENT ORTHO Right 9/3/2017    Procedure: IRRIGATION & DEBRIDEMENT ORTHO, POLY EXCHANGE;  Surgeon: Jerome VALERIO  DENNIS Russell;  Location: SUR[facility];  Service: Orthopedics    COLONOSCOPY WITH CLIPPING  10/28/2015    Procedure: COLONOSCOPY WITH CLIPPING;  Surgeon: Ruben Colon M.D.;  Location: ENDOSCOPY [facility];  Service:     COLONOSCOPY WITH SCLEROTHERAPY  10/28/2015    Procedure: COLONOSCOPY WITH SCLEROTHERAPY;  Surgeon: Ruben Colon M.D.;  Location: ENDOSCOPY [facility];  Service:     COLONOSCOPY WITH TATTOOING  10/28/2015    Procedure: COLONOSCOPY WITH TATTOOING;  Surgeon: Ruben Colon M.D.;  Location: ENDOSCOPY [facility];  Service:     GYN SURGERY  2003    hysteroscoopy    GYN SURGERY  1982    tubal ligation

## 2024-08-19 NOTE — ASSESSMENT & PLAN NOTE
With hepatic encephalopathy  Continue lactulose titrate for 2-3 bowel movements  Continue rifaximin  Minimize sedating agents

## 2024-08-19 NOTE — PROGRESS NOTES
Report received from Roseline MONTAÑO. Assumed care of pt. Pt resting comfortably in bed. A/Ox3, VSS (except high BP), no pain at this time and on room air. All needs met. POC reviewed and white board updated. Tele box on. Call light in reach. Bed locked in lowest position with 2 upper bed rails up.

## 2024-08-19 NOTE — HOSPITAL COURSE
April Alicia is a 64 y.o. 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 Per ERP's report, patient was found wedged in a toilet by neighbors. EMS was called and noted pin point pupils, hypoxic. she is on home oxycodone for chronic pain Narcan was given on route and there was reports that patient responded to narcan.  In the ED, patient was noted with respiratory rate of 17, , /96. Hemoglobin 7.8, platelet count 104, bicarb 14, BUN 26, creatinine 1.0. Patient also noted with Ammonia 93, lactic acid 2.6  Troponin 26, BNP 2710. Given her fall, CXR, head/neck CT was completed and was unremarkable.   Treated for hepatic encephalopathy and some component of toxic encephalopathy from narcotics. Improved clinically and was agreeable to discharge .Patient was determined satisfactory for discharge with appropriate follow up.

## 2024-08-19 NOTE — ASSESSMENT & PLAN NOTE
Patient maintained on oxycodone for review it appears that her dose was recently increased to 10 mg from 5 mg 4 times daily  Hold sedating agents  She would benefit from Narcan prescription on discharge will consult pharmacy      Ms. Alicia was here with a suspected overdose of T40.4 - Synthetic narcotics; she has no other known overdoses.

## 2024-08-19 NOTE — ASSESSMENT & PLAN NOTE
Patient has history of afib, noted irregular heart rate on EKG. Patient has been on apixaban  -Continue out patient apixaban, metoprolol

## 2024-08-19 NOTE — PROGRESS NOTES
Hospital Medicine Daily Progress Note    Date of Service  8/19/2024    Chief Complaint  April Alicia is a 64 y.o. female admitted 8/18/2024 with altered mental status and fall    Hospital Course  April Alicia is a 64 y.o. 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      Per ERP's report, patient was found wedged in a toilet by neighbors. EMS was called and noted pin point pupils, hypoxic. she is on home oxycodone for chronic pain Narcan was given on route and there was reports that patient responded to narcan.      In the ED, patient was noted with respiratory rate of 17, , /96. Hemoglobin 7.8, platelet count 104, bicarb 14, BUN 26, creatinine 1.0. Patient also noted with Ammonia 93, lactic acid 2.6  Troponin 26, BNP 2710. Given her fall, CXR, head/neck CT was completed and was unremarkable.     Interval Problem Update    Patient examined and chart reviewed.  Patient admitted this morning by Dr. Wyatt.    Patient is asleep she is arousable    Patient reports that she had a fall could not get herself back up she has been prescribed oxycodone I reviewed PDMP and it looks like her dose was recently increased to 10 mg.  Given hypoglycemia we will hold Lantus for now and monitor p.o. intake and CBGs discussed with pharmacist  I reviewed UDS positive for oxycodone.  I reviewed chemistry panel magnesium 1.4 sodium 133 potassium 4.0  Ammonia level 93  I repleted her magnesium and ordered repeat levels  Hold sedating agents continue lactulose and rifaximin for hepatic encephalopathy  I reviewed CT head and C-spine negative for acute pathology  I reviewed chest x-ray with no acute pathology    Total time spent today reviewing imaging and lab results  examining patient discussing with nursing staff pharmacist and case management 52 minutes    I have discussed this patient's plan of care and discharge plan at IDT rounds today with Case Management, Nursing, Nursing leadership, and other members of the IDT team.    Consultants/Specialty       Code Status  Full Code    Disposition  The patient is not medically cleared for discharge to home or a post-acute facility.      I have placed the appropriate orders for post-discharge needs.    Review of Systems  Review of Systems   Constitutional:  Positive for malaise/fatigue.   Respiratory:  Negative for shortness of breath.    Cardiovascular:  Negative for chest pain.   Musculoskeletal:  Positive for joint pain.        Physical Exam  Temp:  [36.8 °C (98.2 °F)] 36.8 °C (98.2 °F)  Pulse:  [] 75  Resp:  [12-19] 17  BP: (121-163)/() 145/104  SpO2:  [94 %-99 %] 99 %    Physical Exam  Vitals reviewed.   Constitutional:       Comments: Patient is asleep she is arousable   HENT:      Head: Normocephalic.      Nose: No congestion or rhinorrhea.   Eyes:      General:         Right eye: No discharge.         Left eye: No discharge.   Cardiovascular:      Rate and Rhythm: Normal rate.      Heart sounds: No murmur heard.     No friction rub. No gallop.   Pulmonary:      Breath sounds: Decreased breath sounds present. No rales.   Chest:      Chest wall: No tenderness.   Abdominal:      Palpations: Abdomen is soft.      Tenderness: There is no abdominal tenderness.   Musculoskeletal:         General: Swelling present.      Cervical back: Neck supple.      Comments: Status post left AKA   Neurological:      General: No focal deficit present.         Fluids    Intake/Output Summary (Last 24 hours) at 8/19/2024 1314  Last data filed at 8/19/2024 1000  Gross per 24 hour   Intake 300 ml   Output --   Net 300 ml       Laboratory  Recent Labs     08/19/24  0027   WBC 4.0*   RBC 2.89*   HEMOGLOBIN 7.8*   HEMATOCRIT 25.4*   MCV 87.9    MCH 27.0   MCHC 30.7*   RDW 56.7*   PLATELETCT 104*   MPV 9.1     Recent Labs     08/19/24  0027   SODIUM 133*   POTASSIUM 4.0   CHLORIDE 108   CO2 14*   GLUCOSE 103*   BUN 26*   CREATININE 1.00   CALCIUM 8.3*     Recent Labs     08/19/24  0027   APTT 46.4*   INR 2.46*               Imaging  CT-CSPINE WITHOUT PLUS RECONS   Final Result      Multilevel degenerative disease without definite fracture or change in alignment.      CT-HEAD W/O   Final Result         1. No definite acute intracranial abnormality.   2. Sphenoid sinusitis.               DX-CHEST-PORTABLE (1 VIEW)   Final Result      Interval improvement in cardiomegaly and aeration of the lung bases.           Assessment/Plan  * Hepatic encephalopathy (HCC)- (present on admission)  Assessment & Plan  Lactulose and rifaximin    Narcotic dependence (HCC)  Assessment & Plan  Patient maintained on oxycodone for review it appears that her dose was recently increased to 10 mg from 5 mg 4 times daily  Hold sedating agents  She would benefit from Narcan prescription on discharge will consult pharmacy      Ms. Alicia was here with a suspected overdose of T40.4 - Synthetic narcotics; she has no other known overdoses.        AF (atrial fibrillation) (HCC)- (present on admission)  Assessment & Plan  Continue metoprolol and apixaban    GERD (gastroesophageal reflux disease)- (present on admission)  Assessment & Plan  Continue Prilosec    DM (diabetes mellitus) (HCC)- (present on admission)  Assessment & Plan  Given hypoglycemia hold Lantus  Insulin sliding scale and monitor CBGs and p.o. intake    Hyperlipidemia- (present on admission)  Assessment & Plan  Continue atorvastatin    Primary hypertension- (present on admission)  Assessment & Plan  Continue losartan metoprolol monitor blood pressure    Alcoholic cirrhosis of liver (HCC)- (present on admission)  Assessment & Plan  With hepatic encephalopathy  Continue lactulose titrate for 2-3 bowel movements  Continue  rifaximin  Minimize sedating agents         VTE prophylaxis:    therapeutic anticoagulation with eliquis 5 mg BID      I have performed a physical exam and reviewed and updated ROS and Plan today (8/19/2024). In review of yesterday's note (8/18/2024), there are no changes except as documented above.

## 2024-08-19 NOTE — ASSESSMENT & PLAN NOTE
Patient has a history of alcoholic liver cirrhosis. Patient has had ascites in the past.   Unclear if patient has ascites during current admission. Patient aggressively declined physical examination.

## 2024-08-19 NOTE — ED PROVIDER NOTES
ED Provider Note    CHIEF COMPLAINT  Chief Complaint   Patient presents with    GLF    ALOC       EXTERNAL RECORDS REVIEWED  Inpatient Notes reviewed discharge summary dated June 11, 2024 by Dr. Bishop.  Diagnosed with atrial fibrillation and hepatic encephalopathy with ammonia of 71.    HPI/ROS  LIMITATION TO HISTORY   Select: Altered mental status / Confusion  OUTSIDE HISTORIAN(S):  EMS found to be hypoxic on room air with oxygen saturation 84%, pinpoint pupils, received Narcan 1 mg.    April Alicia is a 64 y.o. female who presents for evaluation of confusion and potential fall.  Patient found by significant other at home next to the commode.  Unclear if she fell.  She is anticoagulated on Eliquis for history of atrial fibrillation.  Unknown if any head strike.  Patient unable to get up and EMS was called.  Per EMS patient was hypoxic and somnolent with pinpoint pupils, given she does take prescription opioids she was given Narcan 1 mg.  Patient improved, she is confused but initial oxygen saturation 93% on my assessment.  Patient himself has no complaints, she denies any pain, she is a very poor historian and cannot tell me her name, she simply answers every question relating that she is cold and would like a blanket.  No outward signs of trauma, no obvious bleeding.    PAST MEDICAL HISTORY   has a past medical history of Abnormal finding of lung (12/27/2022), Acute exacerbation of chronic obstructive pulmonary disease (COPD) (HCC) (01/27/2020), Alcoholic cirrhosis (HCC), Arthritis, ASTHMA, Atrial fibrillation (HCC), Dry eyes (11/16/2017), Edentulous, Emphysema of lung (HCC), H/O: hysterectomy (12/05/2019), Heart burn, Hepatic encephalopathy (HCC) (12/27/2022), Hepatitis C infection (07/28/2022), History of rheumatic fever (09/04/2017), Hypertension, 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HCC), Medication overuse headache (08/15/2017), Mitral regurgitation, Pancytopenia (HCC) (07/26/2022), Pneumonia (02/2020),  Primary osteoarthritis of left knee (2020), Prosthetic joint infection ([facility]) (2018), Protein-calorie malnutrition, severe ([facility]) (2022), Right leg DVT ([facility]) (2018), Seizure disorder ([facility]), Septic arthritis of knee, left ([facility]) (2022), Septic arthritis of shoulder, left ([facility]) (2022), Septic shock due to Pseudomonas species ([facility]) (10/30/2023), and Type 2 diabetes mellitus ([facility]) (2022).    SURGICAL HISTORY   has a past surgical history that includes gyn surgery (); gyn surgery (); colonoscopy with clipping (10/28/2015); colonoscopy with sclerotherapy (10/28/2015); colonoscopy with tattooing (10/28/2015); irrigation & debridement ortho (Right, 9/3/2017); knee arthroplasty total (Right, ); total knee arthroplasty (Left, 2020); irrigation & debridement general (Left, 2022); revise knee joint replace,all parts (Left, 2022); revise knee joint replace,all parts (Left, 2022); irrigation & debridement general (Left, 2022); total knee arthroplasty (Left, 10/30/2023); and knee amputation above (Left, 2024).    FAMILY HISTORY  Family History   Problem Relation Age of Onset    Diabetes Mother     Seizures Father     Heart Attack Father 45    Diabetes Brother     Alcohol abuse Brother     Dementia Brother     Diabetes Brother        SOCIAL HISTORY  Social History     Tobacco Use    Smoking status: Former     Current packs/day: 0.00     Types: Cigarettes     Quit date: 2016     Years since quittin.6    Smokeless tobacco: Former     Quit date: 2021    Tobacco comments:     a few a day when I can   Vaping Use    Vaping status: Never Used   Substance and Sexual Activity    Alcohol use: Not Currently     Comment: hx of AUD    Drug use: Not Currently    Sexual activity: Not Currently     Partners: Male     Birth control/protection: Post-Menopausal     Comment: Has boyfriend, not currently sexually active       CURRENT MEDICATIONS  Home Medications   "  **Home medications have not yet been reviewed for this encounter**       Audit from Redirected Encounters    **Home medications have not yet been reviewed for this encounter**         ALLERGIES  Allergies   Allergen Reactions    Meropenem Swelling     Pt had rxt to meropenem June 2024 (marked tongue swelling and right facial/periorbital edema)    Tuberculin Tests Unspecified     Per MAR from [employer]       PHYSICAL EXAM  VITAL SIGNS: BP (!) 163/80   Pulse 68   Temp 36.8 °C (98.2 °F) (Temporal)   Resp 18   Ht 1.702 m (5' 7\")   Wt 99 kg (218 lb 4.1 oz)   LMP 06/02/2008   SpO2 97%   BMI 34.18 kg/m²    General: Alert, mild acute distress  Skin: Warm, dry, normal for ethnicity  Head: Normocephalic, atraumatic  Neck: Trachea midline, no tenderness to midline of C-spine, no step-off  Eye: PERRL, extraocular movements intact without nystagmus, sclera appear faintly icteric  ENMT: Oral mucosa pink and dry  Cardiovascular: Irregularly irregular rate and rhythm, no murmur, Normal peripheral perfusion  Respiratory: Lungs CTA, respirations are non-labored, breath sounds are equal  Gastrointestinal: Soft, nontender, non distended  Musculoskeletal: No swelling, no deformity.  Left above-the-knee amputation.  Neurological: Alert and awake, GCS of 14.  Appears confused, she cannot tell me her name, but is not aware of the situation, cannot tell me the date, cannot, where she is  Psychiatric: Cooperative, appropriate mood & affect     EKG/LABS  Results for orders placed or performed during the hospital encounter of 08/18/24   CBC With Differential   Result Value Ref Range    WBC 4.0 (L) 4.8 - 10.8 K/uL    RBC 2.89 (L) 4.20 - 5.40 M/uL    Hemoglobin 7.8 (L) 12.0 - 16.0 g/dL    Hematocrit 25.4 (L) 37.0 - 47.0 %    MCV 87.9 81.4 - 97.8 fL    MCH 27.0 27.0 - 33.0 pg    MCHC 30.7 (L) 32.2 - 35.5 g/dL    RDW 56.7 (H) 35.9 - 50.0 fL    Platelet Count 104 (L) 164 - 446 K/uL    MPV 9.1 9.0 - 12.9 fL    Neutrophils-Polys 60.50 " 44.00 - 72.00 %    Lymphocytes 22.60 22.00 - 41.00 %    Monocytes 11.20 0.00 - 13.40 %    Eosinophils 4.00 0.00 - 6.90 %    Basophils 1.50 0.00 - 1.80 %    Immature Granulocytes 0.20 0.00 - 0.90 %    Nucleated RBC 0.00 0.00 - 0.20 /100 WBC    Neutrophils (Absolute) 2.43 1.82 - 7.42 K/uL    Lymphs (Absolute) 0.91 (L) 1.00 - 4.80 K/uL    Monos (Absolute) 0.45 0.00 - 0.85 K/uL    Eos (Absolute) 0.16 0.00 - 0.51 K/uL    Baso (Absolute) 0.06 0.00 - 0.12 K/uL    Immature Granulocytes (abs) 0.01 0.00 - 0.11 K/uL    NRBC (Absolute) 0.00 K/uL   Comp Metabolic Panel   Result Value Ref Range    Sodium 133 (L) 135 - 145 mmol/L    Potassium 4.0 3.6 - 5.5 mmol/L    Chloride 108 96 - 112 mmol/L    Co2 14 (L) 20 - 33 mmol/L    Anion Gap 11.0 7.0 - 16.0    Glucose 103 (H) 65 - 99 mg/dL    Bun 26 (H) 8 - 22 mg/dL    Creatinine 1.00 0.50 - 1.40 mg/dL    Calcium 8.3 (L) 8.5 - 10.5 mg/dL    Correct Calcium 9.3 8.5 - 10.5 mg/dL    AST(SGOT) 17 12 - 45 U/L    ALT(SGPT) 5 2 - 50 U/L    Alkaline Phosphatase 128 (H) 30 - 99 U/L    Total Bilirubin 1.1 0.1 - 1.5 mg/dL    Albumin 2.7 (L) 3.2 - 4.9 g/dL    Total Protein 7.3 6.0 - 8.2 g/dL    Globulin 4.6 (H) 1.9 - 3.5 g/dL    A-G Ratio 0.6 g/dL   Diagnostic Alcohol   Result Value Ref Range    Diagnostic Alcohol <10.1 <10.1 mg/dL   AMMONIA   Result Value Ref Range    Ammonia 93 (H) 11 - 45 umol/L   LACTIC ACID   Result Value Ref Range    Lactic Acid 2.6 (H) 0.5 - 2.0 mmol/L   Prothrombin Time   Result Value Ref Range    PT 27.0 (H) 12.0 - 14.6 sec    INR 2.46 (H) 0.87 - 1.13   APTT   Result Value Ref Range    APTT 46.4 (H) 24.7 - 36.0 sec   TROPONIN   Result Value Ref Range    Troponin T 26 (H) 6 - 19 ng/L   proBrain Natriuretic Peptide, NT   Result Value Ref Range    NT-proBNP 2712 (H) 0 - 125 pg/mL   ESTIMATED GFR   Result Value Ref Range    GFR (CKD-EPI) 63 >60 mL/min/1.73 m 2   MAGNESIUM   Result Value Ref Range    Magnesium 1.4 (L) 1.5 - 2.5 mg/dL   EKG (NOW)   Result Value Ref Range     Report       [facility] Emergency Dept.    Test Date:  2024  Pt Name:    FRANCISCA TURNER          Department: ER  MRN:        [id number]                      Room:        26  Gender:     Female                       Technician: [id number]  :        1960                   Requested By:ROSSY NUR  Order #:    [id number]                    Reading MD:    Measurements  Intervals                                Axis  Rate:       106                          P:          0  FL:         0                            QRS:        21  QRSD:       96                           T:          192  QT:         362  QTc:        481    Interpretive Statements  Atrial fibrillation  Low voltage, extremity leads  Abnormal T, consider ischemia, diffuse leads  Compared to ECG 2024 18:09:02  Possible ischemia now present  T-wave abnormality still present        I have independently interpreted this EKG    RADIOLOGY/PROCEDURES   I have independently interpreted the diagnostic imaging associated with this visit and am waiting the final reading from the radiologist.   My preliminary interpretation is as follows: No fracture nor dislocation to C-spine, CT brain shows no midline shift, no intracranial hemorrhage    Radiologist interpretation:  CT-CSPINE WITHOUT PLUS RECONS   Final Result      Multilevel degenerative disease without definite fracture or change in alignment.      CT-HEAD W/O   Final Result         1. No definite acute intracranial abnormality.   2. Sphenoid sinusitis.               DX-CHEST-PORTABLE (1 VIEW)   Final Result      Interval improvement in cardiomegaly and aeration of the lung bases.          COURSE & MEDICAL DECISION MAKING    ASSESSMENT, COURSE AND PLAN  Care Narrative: This patient is a pleasantly confused 64-year-old with a fairly complicated medical history who presents for evaluation of confusion, altered mentation, and potential ground-level fall.  History and exam  as above.  Reassuringly there is no evidence of any outward signs of trauma and CT of brain is reassuring with no evidence of intracranial hemorrhage, CT C-spine also unremarkable other than degenerative changes.  Metabolic workup demonstrates multiple abnormalities, thankfully many of which are nonactionable and approximating previous baselines.  Anemia also similar to previous.  However patient's ammonia level is higher than before, per history there is obviously some aspect of accidental narcotic overdose given her improvement with Narcan but given this finding as well I do feel the patient would benefit from treatment with lactulose, given she is altered with encephalopathy plan is for admission.    ED OBS: Yes; I am placing the patient in to an observation status due to a diagnostic uncertainty as well as therapeutic intensity. Patient placed in observation status at 11:58 PM, 8/18/2024.     Observation plan is as follows: Patient has received Narcan 1 mg IV while en route, oxygen saturation on room air currently is 93%.  Metabolic/toxicologic workup as well as chest x-ray, CT of C-spine, and CT of head will be obtained.  Differential diagnosis at this point includes but is not restricted to hepatic encephalopathy, sepsis, accidental drug overdose, aspiration, electrolyte derangement, hypoglycemia, DKA    0147: Labs consistent with significant volume depletion, I have ordered fluid bolus at this time, 500 mL of normal saline    0243: CT thankfully reassuring with no evidence of intracranial hemorrhage.  Patient is still confused, I suspect likely hepatic encephalopathy given her impressively elevated ammonia level, will page for admission.    0258: Spoke with the internal medicine resident for Dr. Wyatt, he concurs with plan of care thus far and accepts patient to his service for admission.  I have ordered lactulose 30 mg p.o. for the patient's hepatic encephalopathy.    Upon Reevaluation, the patient's  "condition has: not improved; and will be escalated to hospitalization.    Patient discharged from ED Observation status at 0334 (Time) 8/19/24 (Date).   Hydration: Based on the patient's presentation of Dehydration the patient was given IV fluids. IV Hydration was used because oral hydration was not as rapid as required. Upon recheck following hydration, the patient was doing better, heart rate improving, currently 68.      Patient Vitals for the past 24 hrs:   BP Temp Temp src Pulse Resp SpO2 Height Weight   08/19/24 0427 (!) 163/80 -- Temporal 68 18 97 % 1.702 m (5' 7\") --   08/19/24 0300 121/75 -- -- 86 16 97 % -- --   08/19/24 0215 (!) 141/97 -- -- (!) 105 19 94 % -- --   08/19/24 0200 (!) 131/96 -- -- 66 19 94 % -- --   08/19/24 0100 122/77 -- -- 93 12 97 % -- --   08/19/24 0000 130/81 -- -- (!) 101 14 99 % -- --   08/18/24 2342 (!) 141/84 36.8 °C (98.2 °F) Temporal 98 17 99 % -- --   08/18/24 2341 -- -- -- -- -- -- 1.737 m (5' 8.4\") 99 kg (218 lb 4.1 oz)        ADDITIONAL PROBLEMS MANAGED  Hepatic encephalopathy, volume depletion, ground-level fall, altered mentation    DISPOSITION AND DISCUSSIONS  I have discussed management of the patient with the following physicians and CANDIDO's:  Consulted with Spoke with the internal medicine resident for Dr. Wyatt    Discussion of management with other [facility] or appropriate source(s): None     Escalation of care considered, and ultimately not performed:NA    Barriers to care at this time, including but not limited to: Patient lacks transportation .     Decision tools and prescription drugs considered including, but not limited to:  Initial GCS of 14 .    FINAL DIAGNOSIS  1. Acute hepatic encephalopathy (HCC)    2. Narcotic overdose, accidental or unintentional, initial encounter (HCC)    3. Fall from ground level    4. Hypoxia    5. Lactic acidosis         Electronically signed by: Dmitriy Rehman M.D., 8/18/2024 11:55 PM      "

## 2024-08-19 NOTE — CARE PLAN
The patient is Stable - Low risk of patient condition declining or worsening    Shift Goals  Clinical Goals: patient will maintain stable neuros through the shift  Patient Goals: stay warm  Family Goals: not present    Progress made toward(s) clinical / shift goals:      Problem: Knowledge Deficit - Standard  Goal: Patient and family/care givers will demonstrate understanding of plan of care, disease process/condition, diagnostic tests and medications  Outcome: Progressing  Note: A+ox3 this shift. VSS on room air.      Problem: Skin Integrity  Goal: Skin integrity is maintained or improved  Outcome: Progressing  Note: KIMMY bed. Q2t. Purewick in place. Offloading dressing to RLE heel.      Problem: Fall Risk  Goal: Patient will remain free from falls  Outcome: Progressing  Note: Fall prec in place. Pt/ot consult placed. Per pt, wc at baseline.        Patient is not progressing towards the following goals:

## 2024-08-19 NOTE — PROGRESS NOTES
Bedside report received from DANYEL Frank. Patient resting in bed. Call bell within reach, bed alarm on and in place. All belongings within reach for patient. No further needs at this time. 98% on ra.

## 2024-08-19 NOTE — ASSESSMENT & PLAN NOTE
"Patient was noted Aox4 on questioning, was slow to respond however was able to say current month, location as \"hospital\", and recall falling as the reason for hospital visit. Patient also mentions that she was found by neighbor but was not able to explain events leading up to fall.   Ammonia was noted to be elevated at 93, unclear if patient has been adherent to lactulose, rifaximin. Patient did not respond to question regarding last bowel movements.  On exam, patient exceedingly somnolent, with slurred speech, apathy and aggression, typical of grade 2 hepatic encephalopathy.   Given that patient has history of positive UDS with opiates in setting of bizzare behavior, with history of response to narcan by EMS on route to the ED during this admission, current narcotic use cannot be ruled out.   -admit to tele  -Telemetry for possible narcotic use, cont' pulse oximetry  -resume rifaximin, lactulose, titrated to 2 bowel movements  -UDS ordered  -Given unclear volume status of patient, held home furosemide.    "

## 2024-08-19 NOTE — PROGRESS NOTES
Pt reported she took all her medications yesterday at 1300 but could not recall what medications and what they are for. Pt seems forgetful. RN feels pt unable to give reliable date and time of medications she takes. Notified pharmacy that RN unable to complete med rec. Pharmacy tech notify RN to contact pt's family member (her daugher) for information. This RN called pt's daughter in the chart and pt's daughter did not . Left voicemail.

## 2024-08-19 NOTE — PROGRESS NOTES
4 Eyes Skin Assessment Completed by DANYEL Frank and Autumn, ACT    Head WDL  Ears WDL  Nose WDL  Mouth WDL  Neck WDL  Breast/Chest Bruising  Shoulder Blades WDL  Spine WDL  (R) Arm/Elbow/Hand Redness and Bruising  (L) Arm/Elbow/Hand Redness and Bruising  Abdomen WDL  Groin Redness  Scrotum/Coccyx/Buttocks Redness and Blanching  (R) Leg see picture below, old knee scar  (L) Leg BKA  (R) Heel/Foot/Toe Discoloration and Boggy  (L) Heel/Foot/Toe BKA          Devices In Places Tele Box, Blood Pressure Cuff, and Pulse Ox      Interventions In Place Pillows and Pressure Redistribution Mattress    Possible Skin Injury No    Pictures Uploaded Into Epic N/A  Wound Consult Placed N/A  RN Wound Prevention Protocol Ordered No

## 2024-08-20 VITALS
DIASTOLIC BLOOD PRESSURE: 98 MMHG | TEMPERATURE: 97.9 F | SYSTOLIC BLOOD PRESSURE: 155 MMHG | RESPIRATION RATE: 18 BRPM | BODY MASS INDEX: 34.19 KG/M2 | OXYGEN SATURATION: 100 % | WEIGHT: 217.81 LBS | HEIGHT: 67 IN | HEART RATE: 76 BPM

## 2024-08-20 LAB
ALBUMIN SERPL BCP-MCNC: 2.2 G/DL (ref 3.2–4.9)
ALBUMIN/GLOB SERPL: 0.5 G/DL
ALP SERPL-CCNC: 121 U/L (ref 30–99)
ALT SERPL-CCNC: 6 U/L (ref 2–50)
ANION GAP SERPL CALC-SCNC: 9 MMOL/L (ref 7–16)
AST SERPL-CCNC: 16 U/L (ref 12–45)
BASOPHILS # BLD AUTO: 1.1 % (ref 0–1.8)
BASOPHILS # BLD: 0.04 K/UL (ref 0–0.12)
BILIRUB SERPL-MCNC: 0.8 MG/DL (ref 0.1–1.5)
BUN SERPL-MCNC: 17 MG/DL (ref 8–22)
BURR CELLS BLD QL SMEAR: NORMAL
CALCIUM ALBUM COR SERPL-MCNC: 9.5 MG/DL (ref 8.5–10.5)
CALCIUM SERPL-MCNC: 8.1 MG/DL (ref 8.5–10.5)
CHLORIDE SERPL-SCNC: 112 MMOL/L (ref 96–112)
CO2 SERPL-SCNC: 14 MMOL/L (ref 20–33)
COMMENT 1642: NORMAL
CREAT SERPL-MCNC: 0.61 MG/DL (ref 0.5–1.4)
EOSINOPHIL # BLD AUTO: 0.21 K/UL (ref 0–0.51)
EOSINOPHIL NFR BLD: 5.6 % (ref 0–6.9)
ERYTHROCYTE [DISTWIDTH] IN BLOOD BY AUTOMATED COUNT: 59.1 FL (ref 35.9–50)
GFR SERPLBLD CREATININE-BSD FMLA CKD-EPI: 100 ML/MIN/1.73 M 2
GLOBULIN SER CALC-MCNC: 4.5 G/DL (ref 1.9–3.5)
GLUCOSE BLD STRIP.AUTO-MCNC: 122 MG/DL (ref 65–99)
GLUCOSE BLD STRIP.AUTO-MCNC: 150 MG/DL (ref 65–99)
GLUCOSE BLD STRIP.AUTO-MCNC: 99 MG/DL (ref 65–99)
GLUCOSE SERPL-MCNC: 134 MG/DL (ref 65–99)
HCT VFR BLD AUTO: 24.9 % (ref 37–47)
HGB BLD-MCNC: 7.4 G/DL (ref 12–16)
IMM GRANULOCYTES # BLD AUTO: 0.01 K/UL (ref 0–0.11)
IMM GRANULOCYTES NFR BLD AUTO: 0.3 % (ref 0–0.9)
LYMPHOCYTES # BLD AUTO: 1.01 K/UL (ref 1–4.8)
LYMPHOCYTES NFR BLD: 26.9 % (ref 22–41)
MAGNESIUM SERPL-MCNC: 1.7 MG/DL (ref 1.5–2.5)
MCH RBC QN AUTO: 26.9 PG (ref 27–33)
MCHC RBC AUTO-ENTMCNC: 29.7 G/DL (ref 32.2–35.5)
MCV RBC AUTO: 90.5 FL (ref 81.4–97.8)
MONOCYTES # BLD AUTO: 0.44 K/UL (ref 0–0.85)
MONOCYTES NFR BLD AUTO: 11.7 % (ref 0–13.4)
MORPHOLOGY BLD-IMP: NORMAL
NEUTROPHILS # BLD AUTO: 2.04 K/UL (ref 1.82–7.42)
NEUTROPHILS NFR BLD: 54.4 % (ref 44–72)
NRBC # BLD AUTO: 0 K/UL
NRBC BLD-RTO: 0 /100 WBC (ref 0–0.2)
OVALOCYTES BLD QL SMEAR: NORMAL
PHOSPHATE SERPL-MCNC: 2.7 MG/DL (ref 2.5–4.5)
PLATELET # BLD AUTO: 108 K/UL (ref 164–446)
PLATELET BLD QL SMEAR: NORMAL
PMV BLD AUTO: 10.9 FL (ref 9–12.9)
POIKILOCYTOSIS BLD QL SMEAR: NORMAL
POTASSIUM SERPL-SCNC: 4.2 MMOL/L (ref 3.6–5.5)
PROT SERPL-MCNC: 6.7 G/DL (ref 6–8.2)
RBC # BLD AUTO: 2.75 M/UL (ref 4.2–5.4)
RBC BLD AUTO: PRESENT
SCHISTOCYTES BLD QL SMEAR: NORMAL
SODIUM SERPL-SCNC: 135 MMOL/L (ref 135–145)
WBC # BLD AUTO: 3.8 K/UL (ref 4.8–10.8)

## 2024-08-20 PROCEDURE — 97167 OT EVAL HIGH COMPLEX 60 MIN: CPT

## 2024-08-20 PROCEDURE — 84100 ASSAY OF PHOSPHORUS: CPT

## 2024-08-20 PROCEDURE — 36415 COLL VENOUS BLD VENIPUNCTURE: CPT

## 2024-08-20 PROCEDURE — 97530 THERAPEUTIC ACTIVITIES: CPT

## 2024-08-20 PROCEDURE — 85025 COMPLETE CBC W/AUTO DIFF WBC: CPT

## 2024-08-20 PROCEDURE — A9270 NON-COVERED ITEM OR SERVICE: HCPCS | Performed by: HOSPITALIST

## 2024-08-20 PROCEDURE — 80053 COMPREHEN METABOLIC PANEL: CPT

## 2024-08-20 PROCEDURE — A9270 NON-COVERED ITEM OR SERVICE: HCPCS

## 2024-08-20 PROCEDURE — 97163 PT EVAL HIGH COMPLEX 45 MIN: CPT

## 2024-08-20 PROCEDURE — 97535 SELF CARE MNGMENT TRAINING: CPT

## 2024-08-20 PROCEDURE — 700102 HCHG RX REV CODE 250 W/ 637 OVERRIDE(OP)

## 2024-08-20 PROCEDURE — 83735 ASSAY OF MAGNESIUM: CPT

## 2024-08-20 PROCEDURE — 700102 HCHG RX REV CODE 250 W/ 637 OVERRIDE(OP): Performed by: HOSPITALIST

## 2024-08-20 PROCEDURE — 82962 GLUCOSE BLOOD TEST: CPT | Mod: 91

## 2024-08-20 PROCEDURE — 99239 HOSP IP/OBS DSCHRG MGMT >30: CPT | Performed by: INTERNAL MEDICINE

## 2024-08-20 RX ADMIN — LACTULOSE 15 ML: 10 SOLUTION ORAL at 17:15

## 2024-08-20 RX ADMIN — LOSARTAN POTASSIUM 100 MG: 50 TABLET, FILM COATED ORAL at 04:58

## 2024-08-20 RX ADMIN — OMEPRAZOLE 20 MG: 20 CAPSULE, DELAYED RELEASE ORAL at 04:58

## 2024-08-20 RX ADMIN — GABAPENTIN 400 MG: 400 CAPSULE ORAL at 04:58

## 2024-08-20 RX ADMIN — METOPROLOL TARTRATE 75 MG: 25 TABLET, FILM COATED ORAL at 17:14

## 2024-08-20 RX ADMIN — RIFAXIMIN 550 MG: 550 TABLET ORAL at 04:58

## 2024-08-20 RX ADMIN — RIFAXIMIN 550 MG: 550 TABLET ORAL at 17:14

## 2024-08-20 RX ADMIN — GABAPENTIN 400 MG: 400 CAPSULE ORAL at 17:14

## 2024-08-20 RX ADMIN — NYSTATIN 100000 UNITS: 100000 POWDER TOPICAL at 18:00

## 2024-08-20 RX ADMIN — APIXABAN 5 MG: 5 TABLET, FILM COATED ORAL at 04:58

## 2024-08-20 RX ADMIN — NYSTATIN 100000 UNITS: 100000 POWDER TOPICAL at 12:00

## 2024-08-20 RX ADMIN — NYSTATIN 100000 UNITS: 100000 POWDER TOPICAL at 06:34

## 2024-08-20 RX ADMIN — METOPROLOL TARTRATE 75 MG: 25 TABLET, FILM COATED ORAL at 04:58

## 2024-08-20 RX ADMIN — APIXABAN 5 MG: 5 TABLET, FILM COATED ORAL at 17:15

## 2024-08-20 ASSESSMENT — COGNITIVE AND FUNCTIONAL STATUS - GENERAL
SUGGESTED CMS G CODE MODIFIER MOBILITY: CL
WALKING IN HOSPITAL ROOM: TOTAL
PERSONAL GROOMING: A LITTLE
DAILY ACTIVITIY SCORE: 16
CLIMB 3 TO 5 STEPS WITH RAILING: TOTAL
TURNING FROM BACK TO SIDE WHILE IN FLAT BAD: A LITTLE
MOVING FROM LYING ON BACK TO SITTING ON SIDE OF FLAT BED: A LITTLE
SUGGESTED CMS G CODE MODIFIER DAILY ACTIVITY: CK
MOBILITY SCORE: 14
STANDING UP FROM CHAIR USING ARMS: A LITTLE
HELP NEEDED FOR BATHING: A LOT
MOVING TO AND FROM BED TO CHAIR: A LITTLE
DRESSING REGULAR LOWER BODY CLOTHING: A LOT
DRESSING REGULAR UPPER BODY CLOTHING: A LITTLE
TOILETING: A LOT

## 2024-08-20 ASSESSMENT — ACTIVITIES OF DAILY LIVING (ADL): TOILETING: REQUIRES ASSIST

## 2024-08-20 ASSESSMENT — FIBROSIS 4 INDEX: FIB4 SCORE: 4.68

## 2024-08-20 ASSESSMENT — GAIT ASSESSMENTS: GAIT LEVEL OF ASSIST: UNABLE TO PARTICIPATE

## 2024-08-20 NOTE — THERAPY
"Occupational Therapy   Initial Evaluation     Patient Name: April Alicia  Age:  64 y.o., Sex:  female  Medical Record #: [id number]  Today's Date: 8/20/2024     Precautions  Precautions: Fall Risk  Comments: Hx of L AKA and [place]    Assessment  Patient is 64 y.o. female admitted after GLF w/AMS found to have hepatic encephalopathy, narcotic dependence (+ for oxycodone), and suspected overdose. PMHX of COPD, L AKA, and alcoholic cirrhosis of liver; recent admit for decompensated cirrhosis. Pt reports she is comfortable going home at [facility], but has questionable insight/safety awareness. Pt is an inconsistent historian; recommend clarification on home setup, PLOF, and amount of assist available. Reports she lives with her dtr (CNA at [facility] [employer]), her SO, and her teenage granddtr; per PT, she has someone home during the day that can assist her PRN. Reports all are gone during the day at work/school, but can assist PRN when home. Pt reported to OT that she drives her w/c into the BR and using bars for stand pivot txf to shower seat or toilet, but per PT, her w/c doesn't fit in BR and she propels to doorway, then pulls \"shower chair\" toward her then txfs to chair and then again toilet. Unclear what is accurate. Currently limited by decreased functional mobility, activity tolerance, cognition, strength, AROM, balance, and pain which are currently affecting pt's ability to complete ADLs/IADLs at baseline. Will continue to follow.     Plan    Occupational Therapy Initial Treatment Plan   Treatment Interventions: Self Care / Activities of Daily Living, Adaptive Equipment, Neuro Re-Education / Balance, Therapeutic Exercises, Therapeutic Activity, Family / Caregiver Training  Treatment Frequency: 3 Times per Week  Duration: Until Therapy Goals Met    DC Equipment Recommendations: Raised Toilet Seat with Arms, Reacher  Discharge Recommendations: Other - (If pt has assistance during the day, then recommend [facility]. However, if pt is " "alone for hours at a time and  requires several txfs to get from w/c to toilet, then recommend post acute placement for continued OT services)      Objective     08/20/24 1623   Prior Living Situation   Prior Services Intermittent Physical Support for ADL Per Family   Housing / Facility 1 Story House   Steps Into Home 2   Bathroom Set up Walk In Shower;Shower Chair   Equipment Owned Wheelchair;Tub / Shower Seat;Grab Bar(s) In Tub / Shower;Grab Bar(s) By Toilet;Raised Toilet Seat Without Arms;Hand Held Shower   Lives with - Patient's Self Care Capacity Adult Children;Child Less than 18 Years of Age;Significant Other   Comments Pt is an inconsistent historian; recommend clarification on home setup, PLOF, and amount of assist available. 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 Reports all are gone during the day at work/school, but can assist PRN when home. Pt reported to OT that she drives her w/c into the BR and using bars for stand pivot txf to shower seat or toilet, but per PT, her w/c doesn't fit in BR and she propels to doorway, then pulls \"shower chair\" toward her then txfs to chair and then again toilet. Unclear what is accurate.   Prior Level of ADL Function   Self Feeding Independent   Grooming / Hygiene Independent   Bathing Requires Assist   Dressing Requires Assist   Toileting Requires Assist   Prior Level of IADL Function   Medication Management Requires Assist   Laundry Dependent   Kitchen Mobility Requires Assist   Finances Requires Assist   Home Management Dependent   Shopping Dependent   Prior Level Of Mobility Uses Wheel Chair for in Home Mobility   Driving / Transportation Relatives / Others Provide Transportation   History of Falls   History of Falls Yes   Date of Last Fall 08/18/24  (reason for admit)   Precautions   Precautions Fall Risk   Comments Hx of L AKA and MARYANA   Vitals   O2 Delivery " Device None - Room Air   Pain 0 - 10 Group   Location Shoulder   Location Orientation Left   Therapist Pain Assessment During Activity;Nurse Notified  (not quantified)   Cognition    Cognition / Consciousness X   Speech/ Communication Delayed Responses;Slurred   Level of Consciousness Alert   Safety Awareness Impaired   New Learning Impaired   Attention Impaired   Comments pleasant and cooperative; very [place]. Poor retention/receptivity to education; recommend reinforcement. decreased memory, processing, and multiple off topic statements (unclear if d/t [place])   Passive ROM Upper Body   Passive ROM Upper Body WDL   Active ROM Upper Body   Active ROM Upper Body  X   Comments L shoulder limited by pain at baseline   Strength Upper Body   Upper Body Strength  X   Comments L shoulder limited by pain at baseline   Coordination Upper Body   Coordination WDL   Balance Assessment   Sitting Balance (Static) Fair +   Sitting Balance (Dynamic) Fair   Standing Balance (Static) Fair -   Standing Balance (Dynamic) Poor +   Weight Shift Sitting Fair   Weight Shift Standing Absent   Comments stand pivot txfs   Bed Mobility    Supine to Sit Contact Guard Assist   Sit to Supine Supervised   Scooting Supervised   Comments HOB elevated   ADL Assessment   Eating Supervision   Grooming Supervision;Seated  (at sink)   Upper Body Dressing Minimal Assist   Lower Body Dressing Moderate Assist   Toileting Maximal Assist  (purewick, but likely could get to [facility])   Comments Educated on home safety, adaptive techniques for ADLs, DME for BR (RTS w/arms), ADL txf safety, and importance of continued OOB activity.   Functional Mobility   Sit to Stand Contact Guard Assist   Bed, Chair, Wheelchair Transfer Minimal Assist   Toilet Transfers   (declined d/t low toilet height. recommend RTS w/arms)   Transfer Method Stand Pivot   Mobility w/ w/c; bed>toilet>sink>BTB   Edema / Skin Assessment   Edema / Skin  Not Assessed   Activity Tolerance   Comments limited  by fatigue, cognition, and pain   Patient / Family Goals   Patient / Family Goal #1 to go home   Short Term Goals   Short Term Goal # 1 LB dressing with SPV   Short Term Goal # 2 toilet txf with RTS and SPV   Short Term Goal # 3 toileting with SPV including clothing mgmt   Education Group   Education Provided Role of Occupational Therapist;Pathology of bedrest   Role of Occupational Therapist Patient Response Patient;Acceptance;Explanation;Reinforcement Needed;No Learning Evidence   Pathology of Bedrest Patient Response Patient;Acceptance;Explanation;Reinforcement Needed;No Learning Evidence   Occupational Therapy Initial Treatment Plan    Treatment Interventions Self Care / Activities of Daily Living;Adaptive Equipment;Neuro Re-Education / Balance;Therapeutic Exercises;Therapeutic Activity;Family / Caregiver Training   Treatment Frequency 3 Times per Week   Duration Until Therapy Goals Met   Problem List   Problem List Decreased Active Daily Living Skills;Decreased Homemaking Skills;Decreased Upper Extremity Strength Left;Decreased Upper Extremity Strength Right;Decreased Upper Extremity AROM Left;Decreased Functional Mobility;Decreased Activity Tolerance;Safety Awareness Deficits / Cognition;Impaired Cognitive Function;Impaired Postural Control / Balance

## 2024-08-20 NOTE — CARE PLAN
The patient is Stable - Low risk of patient condition declining or worsening    Shift Goals  Clinical Goals: stable neuro, stable hemodynamics  Patient Goals: be warm, comfortable  Family Goals: unable to assess    Progress made toward(s) clinical / shift goals:    Problem: Knowledge Deficit - Standard  Goal: Patient and family/care givers will demonstrate understanding of plan of care, disease process/condition, diagnostic tests and medications  Outcome: Progressing     Problem: Skin Integrity  Goal: Skin integrity is maintained or improved  Outcome: Progressing     Problem: Fall Risk  Goal: Patient will remain free from falls  Outcome: Progressing       Patient is not progressing towards the following goals:

## 2024-08-20 NOTE — DISCHARGE SUMMARY
Discharge Summary    CHIEF COMPLAINT ON ADMISSION  Chief Complaint   Patient presents with    GLF    ALOC       Reason for Admission  Hepatic encephalopathy (HCC)     Admission Date  8/18/2024    CODE STATUS  Full Code    HPI & HOSPITAL COURSE  April Alicia is a 64 y.o. 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 Per ERP's report, patient was found wedged in a toilet by neighbors. EMS was called and noted pin point pupils, hypoxic. she is on home oxycodone for chronic pain Narcan was given on route and there was reports that patient responded to narcan.  In the ED, patient was noted with respiratory rate of 17, , /96. Hemoglobin 7.8, platelet count 104, bicarb 14, BUN 26, creatinine 1.0. Patient also noted with Ammonia 93, lactic acid 2.6  Troponin 26, BNP 2710. Given her fall, CXR, head/neck CT was completed and was unremarkable.   Treated for hepatic encephalopathy and some component of toxic encephalopathy from narcotics. Improved clinically and was agreeable to discharge .Patient was determined satisfactory for discharge with appropriate follow up.      Therefore, she is discharged in fair and stable condition to home with close outpatient follow-up.    The patient met 2-midnight criteria for an inpatient stay at the time of discharge.    Discharge Date  08/20/2024    FOLLOW UP ITEMS POST DISCHARGE  Please follow up with PCP in 3-5 days for post hospitalization follow up and medication reconciliation.     DISCHARGE DIAGNOSES  Principal Problem:    Hepatic encephalopathy (HCC) (POA: Yes)  Active Problems:    Alcoholic cirrhosis of liver (HCC) (POA: Yes)    Hyperlipidemia (POA: Yes)    AF (atrial fibrillation) (HCC) (POA: Yes)    Narcotic dependence  (HCC) (POA: Unknown)    Chronic atrial fibrillation (HCC) (POA: Unknown)    Primary hypertension (POA: Yes)    DM (diabetes mellitus) (HCC) (POA: Yes)    GERD (gastroesophageal reflux disease) (POA: Yes)  Resolved Problems:    * No resolved hospital problems. *      FOLLOW UP  No future appointments.  No follow-up provider specified.    MEDICATIONS ON DISCHARGE     Medication List        CHANGE how you take these medications        Instructions   atorvastatin 20 MG Tabs  What changed: when to take this  Commonly known as: Lipitor   Take 1 Tablet by mouth every evening.  Dose: 20 mg            CONTINUE taking these medications        Instructions   acetaminophen 325 MG Tabs  Commonly known as: Tylenol   Take 2 Tablets by mouth every 8 hours.  Dose: 650 mg     albuterol 108 (90 Base) MCG/ACT Aers inhalation aerosol   Inhale 2 Puffs every 4 hours. Every 4-6 hours prn  Dose: 2 Puff     clotrimazole-betamethasone 1-0.05 % Crea  Commonly known as: Lotrisone   Apply 1 Application topically 2 times a day. Apply to right arm rash 2 times daily for 7 days  Dose: 1 Application     Eliquis 5 MG Tabs  Generic drug: apixaban   Take 1 Tablet by mouth 2 times a day. Indications: Thromboembolism secondary to Atrial Fibrillation  Dose: 5 mg     furosemide 40 MG Tabs  Commonly known as: Lasix   Take 1 Tablet by mouth 2 times a day.  Dose: 40 mg     gabapentin 400 MG Caps  Commonly known as: Neurontin   Take 400 mg by mouth 2 times a day.  Dose: 400 mg     insulin GLARGINE 100 UNIT/ML Soln  Commonly known as: Lantus,Semglee   Inject 10 Units under the skin every evening.  Dose: 10 Units     insulin regular 100 Unit/mL Soln  Commonly known as: HumuLIN R/NovoLIN R   Doctor's comments: 151 - 200 mg/dL = 1 Units 201 - 250 mg/dL = 3 Units 251 - 300 mg/dL = 5 Units 301 - 350 mg/dL = 7 Units 351 - 400 mg/dL = 8 Units Over 400 mg/dL = 10 Units  Inject 1-6 Units under the skin 4 Times a Day,Before Meals and at Bedtime.  Dose: 1-6 Units      lactulose 20 GM/30ML Soln   Take 30 mL by mouth 3 times a day. Hold dose if 4 or more soft or loose stools in the last 24 hours, please gradually increase amount per dose by 5mls if you do not have at least 2 stools per day.  Dose: 30 mL     losartan 100 MG Tabs  Commonly known as: Cozaar   Take 100 mg by mouth every morning.  Dose: 100 mg     Metoprolol Tartrate 75 MG Tabs   Take 75 mg by mouth 2 times a day.  Dose: 75 mg     nystatin powder  Commonly known as: Mycostatin   Apply 1 Application topically 3 times a day. For 10 days  Dose: 1 Application     ondansetron 4 MG Tbdp  Commonly known as: Zofran ODT   Take 1 Tablet by mouth every four hours as needed for Nausea/Vomiting (give PO if no IV route available).  Dose: 4 mg     pantoprazole 20 MG tablet  Commonly known as: Protonix   Take 20 mg by mouth every morning.  Dose: 20 mg     riFAXIMin 550 MG Tabs tablet  Commonly known as: Xifaxan   Take 550 mg by mouth 2 times a day.  Dose: 550 mg              Allergies  Allergies   Allergen Reactions    Meropenem Swelling     Pt had rxt to meropenem June 2024 (marked tongue swelling and right facial/periorbital edema)    Tuberculin Tests Unspecified     Per MAR from [facility]       DIET  Orders Placed This Encounter   Procedures    Diet Order Diet: Renal     Standing Status:   Standing     Number of Occurrences:   1     Order Specific Question:   Diet:     Answer:   Renal [8]       LABORATORY  Lab Results   Component Value Date    SODIUM 135 08/20/2024    POTASSIUM 4.2 08/20/2024    CHLORIDE 112 08/20/2024    CO2 14 (L) 08/20/2024    GLUCOSE 134 (H) 08/20/2024    BUN 17 08/20/2024    CREATININE 0.61 08/20/2024    CREATININE 0.9 03/12/2009        Lab Results   Component Value Date    WBC 3.8 (L) 08/20/2024    HEMOGLOBIN 7.4 (L) 08/20/2024    HEMATOCRIT 24.9 (L) 08/20/2024    PLATELETCT 108 (L) 08/20/2024        Total time of the discharge process exceeds 41 minutes.

## 2024-08-20 NOTE — DISCHARGE PLANNING
DC Transport Scheduled    Transport Company Scheduled:  [employer]  Spoke with [facility] to schedule transport.  [facility] Trip #: [id number]     Scheduled Date: 8/20/2024  Scheduled Time: TBD- Will call the unit with time.     Destination: Home   Destination address: [address] [place] [address]    Notified care team of scheduled transport via Vo[employer].     If there are any changes needed to the DC transportation scheduled, please contact [facility] at ext. [phone] between the hours of 1055-6122 Mon-Fri. If outside those hours, contact the ED Case Manager at ext. [phone]. '

## 2024-08-20 NOTE — RESPIRATORY CARE
COPD EDUCATION by COPD CLINICAL EDUCATOR  8/20/2024 at 6:44 AM by Daniela Morris, RRT     Patient reviewed by COPD education team. Patient does not have a history or diagnosis of COPD and is a non-smoker.  Therefore, patient does not qualify for the COPD program. PFT in EMR 2/20/2020 confirms no obstruction.

## 2024-08-20 NOTE — PROGRESS NOTES
Bedside report received from off going RN/tech: Moni, assumed care of patient.     Fall Risk Score: MODERATE RISK  Fall risk interventions in place: Place yellow fall risk ID band on patient, Provide patient/family education based on risk assessment, Educate patient/family to call staff for assistance when getting out of bed, Place fall precaution signage outside patient door, and Utilize bed/chair fall alarm  Bed type: Regular (Sincere Score less than 17 interventions in place)  Patient on cardiac monitor: Yes  IVF/IV medications: Not Applicable   Oxygen: Room Air  Bedside sitter: Not Applicable   Isolation: Not applicable

## 2024-08-20 NOTE — PROGRESS NOTES
Naloxone 4 mg/0.1 mL nasal spray was dispensed to the patient's family member for prevention of potential opioid related overdose per protocol.   Counseling was provided regarding:  - Information relating to the recognition, prevention and responses to opioid-related drug overdoses  - How to safely administer the naloxone nasal spray  - Potential side effects and adverse events related to the naloxone nasal spray  - The importance of seeking immediate emergency medical assistance for a person experiencing an opioid-related drug overdose, even after the administration of naloxone  - The immunity from certain civil or criminal liabilities for seeking medical assistance for a person experiencing an opioid-related overdose    Danielle Baker PharmD.

## 2024-08-20 NOTE — PROGRESS NOTES
Monitor Summary  Rhythm: A fib   Rate: 72-90  Ectopy: NUPUR CARTER   - / .09 / -

## 2024-08-20 NOTE — RESPIRATORY CARE
COPD EDUCATION by COPD CLINICAL EDUCATOR  8/20/2024 at 6:07 AM by Diane Garcia RRT     Patient reviewed by COPD education team. Patient does not have a history or diagnosis of COPD and is a non-smoker.  Therefore, patient does not qualify for the COPD program.

## 2024-08-21 NOTE — DISCHARGE INSTRUCTIONS
Hepatic Encephalopathy    Hepatic encephalopathy is a change in brain function that includes changes in the ability to think and to use muscles. This condition happens when a person has advanced liver disease. When the liver is damaged, harmful substances (toxins) can build up in the body. Some of these toxins, such as ammonia, can harm the brain.  The effects of the condition depend on the type of liver damage and how severe it is. In some cases, hepatic encephalopathy can be reversed.  What are the causes?  Certain things can trigger or worsen liver function, which can result in hepatic encephalopathy. These things include:  Infection.  Constipation.  Taking certain medicines, such as benzodiazepines.  Alcohol use.  Bleeding into the intestinal tract.  Imbalances in minerals (electrolytes) in the body.  Dehydration.  Hepatic encephalopathy can sometimes be reversed if these triggers are resolved.  What increases the risk?  You are at risk of developing this condition if you have advanced liver disease (cirrhosis). Conditions that can cause liver disease include:  Infections in the liver, such as hepatitis C.  Infections in the blood.  Drinking a lot of alcohol over a long period of time.  Taking certain medicines, including tranquilizers, diuretics, antidepressants, sleeping pills, or acetaminophen.  Genetic diseases, such as Mario's disease.  What are the signs or symptoms?  Symptoms may develop suddenly or may develop slowly and get worse gradually. Symptoms can range from mild to severe.  Mild symptoms include:  Mild confusion.  Shortened attention span.  Personality and mood changes.  Anxiety and agitation.  Drowsiness.  Symptoms of worsening or severe hepatic encephalopathy include:  Extreme confusion (disorientation).  Slowed movement.  Slurred speech.  Extreme personality changes.  Abnormal shaking or flapping of the hands (asterixis).  Coma.  How is this diagnosed?  This condition may be diagnosed based  on:  A physical exam.  Your symptoms and medical history.  Blood tests. These may be done to check levels of ammonia in your blood, measure how long it takes your blood to clot, or check for infection.  Liver function tests. These may be done to check how well your liver is working.  MRI and CT scans. These may be done to check for a brain disorder and to check for problems with your liver.  Electroencephalogram (EEG). This test measures the electrical activity in your brain.  How is this treated?  The first step in treatment is to identify and treat the cause of your liver damage or triggering illness, if possible. The next step is taking medicine to lower the level of toxins in your body and prevent ammonia from building up. Treatment will depend on how severe your encephalopathy is, and may include:  Medicine to lower your ammonia level (lactulose).  Antibiotic medicine to reduce the amount of ammonia-producing bacteria in your gut.  Close monitoring of your blood pressure, heart rate, breathing, and oxygen levels.  Removal of fluid from your abdomen.  Close monitoring of how you think, feel, and act (mental status).  Changes to your diet.  Liver transplant, in severe cases.  Follow these instructions at home:  Medicines  Take over-the-counter and prescription medicines only as told by your health care provider.  If you were prescribed an antibiotic medicine, take it as told by your health care provider. Do not stop using the antibiotic even if you start to feel better.  Do not start taking any new medicines, including over-the-counter medicines, without first checking with your health care provider.  Eating and drinking    Work with a dietitian or your health care provider to make sure you are getting the right balance of protein and minerals.  Eat small meals throughout the day with a late-night snack of complex carbohydrates. Do not fast.  Drink enough fluids to keep your urine pale yellow.  Do not drink  alcohol.  General instructions  Do not use drugs.  Ask your health care provider if it is safe for you to drive.  Keep all follow-up visits. This is important.  Contact a health care provider if:  You develop new symptoms.  Your symptoms change or get worse.  You have a fever or chills.  You have persistent nausea, vomiting, or diarrhea.  Get help right away if:  You become very confused or drowsy.  You vomit blood or material that looks like coffee grounds.  Your stool is bloody, black, or looks like tar.  Summary  Hepatic encephalopathy is a change in brain function that includes changes in the ability to think and to use muscles. This condition happens when a person has advanced liver disease.  Certain things can trigger or worsen hepatic encephalopathy. Hepatic encephalopathy can sometimes be reversed if these triggers are resolved.  The first step in treatment is to identify and treat the cause of your liver damage or triggering illness, if possible. The next step is taking medicine to lower the level of toxins in your body and prevent ammonia from building up.  Your treatment will depend on how severe your hepatic encephalopathy is.  This information is not intended to replace advice given to you by your health care provider. Make sure you discuss any questions you have with your health care provider.  Document Revised: 09/14/2021 Document Reviewed: 09/14/2021  [employer] Patient Education © 2023 [employer]

## 2024-08-21 NOTE — THERAPY
Physical Therapy   Initial Evaluation     Patient Name: April Alicia  Age:  64 y.o., Sex:  female  Medical Record #: [id number]  Today's Date: 8/20/2024     Precautions  Precautions: Fall Risk  Comments: Hx of L AKA and Sun'aq    Assessment  Patient is 64 y.o. female presenting with AMS and fall. Pt found to have hepatic encephalopathy. Pt with PMH including HTN, HLD, COPD (no PFTs), DM2, GERD, alcoholic cirrhosis, remote cocaine use, atrial fibrillation on anticoagulation, h/o L AKA (due to chronic septic joint in 10/23), with a recent hospitalization for decompensated cirrhosis. Pt is well-known to this therapy department. Pt is independent with functional mobility at baseline at wc level. Lives in 1SH with SO, dtr, and granddtr per pt report however pt was an inconsistent historian. Recommend clarifying with family amount of support available at home.     During current session, pt presents with impaired cog, poor safety awareness, generalized weakness, impaired balance, and decreased endurance requiring overall min A-CGA for mobility as detailed below. Able to stand pivot txfer from bed>wc with CGA and self-propel in wc 140 ft with SPV. Pt required extra time for all activities d/t slow pace. Encouraged pt to continue mobilizing up to chair with nursing as tolerated. Recommend d/c home with HHPT as long as family is present and can provide support as needed. If family is gone during the day, recommend post-acute placement. Pt would benefit from continued acute PT services to improve functional mobility prior to d/c.    Plan    Physical Therapy Initial Treatment Plan   Treatment Plan : Bed Mobility, Equipment, Family / Caregiver Training, Manual Therapy, Neuro Re-Education / Balance, Self Care / Home Evaluation, Therapeutic Activities, Therapeutic Exercise  Treatment Frequency: 3 Times per Week  Duration: Until Therapy Goals Met    DC Equipment Recommendations: Unable to determine at this time  Discharge  "Recommendations: Other - (recommend d/c home with HHPT as long as family is present and can provide support as needed. If family is gone during the day, recommend post-acute placement)       Subjective    Pt received resting in bed, agreeable to participate.      Objective       08/20/24 1201   Initial Contact Note    Initial Contact Note Order Received and Verified, Physical Therapy Evaluation in Progress with Full Report to Follow.   Precautions   Precautions Fall Risk   Comments L AKA   Vitals   O2 Delivery Device None - Room Air   Pain 0 - 10 Group   Therapist Pain Assessment Post Activity Pain Same as Prior to Activity;Nurse Notified  (no c/o increased pain)   Prior Living Situation   Prior Services Intermittent Physical Support for ADL Per Family   Housing / Facility 1 Story House   Steps Into Home 2   Steps In Home 0   Equipment Owned Front-Wheel Walker;Wheelchair;Adjustable Bed Without Rails   Lives with - Patient's Self Care Capacity Significant Other;Adult Children   Comments Pt was a questionable historian. Reported that she lives with dtr and \"my nellie\" who both work during the day. Pt initially reported that 15 yo tereso will be staying with her to help then she reported that tereso lives with them. Unclear whether tereso goes to school outside the home   Prior Level of Functional Mobility   Bed Mobility Independent   Transfer Status Independent   Ambulation Dependent   Ambulation Distance non-ambulatory   Assistive Devices Used Wheelchair   Wheelchair Independent   Stairs Dependent   Comments pt reported that she uses wc at home however wc does not fit into bathroom. Pt instead pulls \"shower chair\" to doorway then stand pivot txfers from wc>shower chair>toilet. Dtr bumps pt up 2 MELVIN in wc   History of Falls   History of Falls Yes   Date of Last Fall   (related to admit)   Cognition    Cognition / Consciousness X   Speech/ Communication Hard of Hearing   Level of Consciousness Alert   Safety " Awareness Impaired   New Learning Impaired   Attention Impaired   Comments cooperative, can be impatient, adamant about d/c'ing home   Passive ROM Lower Body   Passive ROM Lower Body WDL   Active ROM Lower Body    Active ROM Lower Body  WDL   Strength Lower Body   Lower Body Strength  X   Gross Strength Generalized Weakness, Equal Bilaterally   Comments h/o L AKA, functional for stand pivot txfer per baseline   Sensation Lower Body   Comments no c/o altered sensation BLE   Coordination Lower Body    Coordination Lower Body  X   Comments slow and weak   Balance Assessment   Sitting Balance (Static) Fair +   Sitting Balance (Dynamic) Fair   Standing Balance (Static) Fair -   Standing Balance (Dynamic) Fair -   Weight Shift Sitting Fair   Weight Shift Standing Absent  (L AKA)   Comments stand pivot txfer   Bed Mobility    Supine to Sit Minimal Assist   Scooting Minimal Assist   Comments HOBE (pt endorses adjustable bed at home but unclear if this is accurate), up in wc post, RN aware   Gait Analysis   Gait Level Of Assist Unable to Participate   Comments non-ambulatory at baseline   Functional Mobility   Sit to Stand Contact Guard Assist   Bed, Chair, Wheelchair Transfer Contact Guard Assist   Transfer Method Stand Pivot   Mobility bed>wc via stand pivot txfer>wc mobility in hallway>wc in room   Wheelchair Assist Supervised   Distance Wheelchair (Feet or Distance) 140  (slow pace)   6 Clicks Assessment - How much HELP from from another person do you currently need... (If the patient hasn't done an activity recently, how much help from another person do you think he/she would need if he/she tried?)   Turning from your back to your side while in a flat bed without using bedrails? 3   Moving from lying on your back to sitting on the side of a flat bed without using bedrails? 3   Moving to and from a bed to a chair (including a wheelchair)? 3   Standing up from a chair using your arms (e.g., wheelchair, or bedside chair)?  3   Walking in hospital room? 1   Climbing 3-5 steps with a railing? 1   6 clicks Mobility Score 14   Patient / Family Goals    Patient / Family Goal #1 to go home   Short Term Goals    Short Term Goal # 1 Pt will perform supine<>sit with bed controls and SPV to progress bed mobility in 6 visits.   Short Term Goal # 2 Pt will perform sit<>stand and stand pivot txfer into/out of wc with SPV to progress OOB mobility in 6 visits.   Short Term Goal # 3 Pt will perform wc mobility 150 ft with SPV to access home in 6 visits.   Education Group   Education Provided Role of Physical Therapist   Role of Physical Therapist Patient Response Patient;Acceptance;Explanation;Demonstration;Verbal Demonstration;Action Demonstration   Physical Therapy Initial Treatment Plan    Treatment Plan  Bed Mobility;Equipment;Family / Caregiver Training;Manual Therapy;Neuro Re-Education / Balance;Self Care / Home Evaluation;Therapeutic Activities;Therapeutic Exercise   Treatment Frequency 3 Times per Week   Duration Until Therapy Goals Met   Problem List    Problems Impaired Bed Mobility;Impaired Transfers;Impaired Ambulation;Functional Strength Deficit;Impaired Balance;Decreased Activity Tolerance;Safety Awareness Deficits / Cognition   Anticipated Discharge Equipment and Recommendations   DC Equipment Recommendations Unable to determine at this time   Discharge Recommendations Other -  (recommend d/c home with HHPT as long as family is present and can provide support as needed. If family is gone during the day, recommend post-acute placement)   Interdisciplinary Plan of Care Collaboration   IDT Collaboration with  Nursing;Occupational Therapist   Patient Position at End of Therapy Seated;Call Light within Reach;Tray Table within Reach;Phone within Reach   Collaboration Comments RN and OT updated   Session Information   Date / Session Number  8/20- 1(1/3, 8/26)

## 2024-09-21 ENCOUNTER — APPOINTMENT (OUTPATIENT)
Dept: RADIOLOGY | Facility: MEDICAL CENTER | Age: 64
DRG: 309 | End: 2024-09-21
Attending: STUDENT IN AN ORGANIZED HEALTH CARE EDUCATION/TRAINING PROGRAM
Payer: MEDICAID

## 2024-09-21 ENCOUNTER — HOSPITAL ENCOUNTER (INPATIENT)
Facility: MEDICAL CENTER | Age: 64
LOS: 4 days | DRG: 309 | End: 2024-09-26
Attending: STUDENT IN AN ORGANIZED HEALTH CARE EDUCATION/TRAINING PROGRAM | Admitting: STUDENT IN AN ORGANIZED HEALTH CARE EDUCATION/TRAINING PROGRAM
Payer: MEDICAID

## 2024-09-21 DIAGNOSIS — R00.1 SYMPTOMATIC BRADYCARDIA: ICD-10-CM

## 2024-09-21 DIAGNOSIS — E83.42 HYPOMAGNESEMIA: ICD-10-CM

## 2024-09-21 DIAGNOSIS — Z79.4 TYPE 2 DIABETES MELLITUS WITH DIABETIC NEUROPATHY, WITH LONG-TERM CURRENT USE OF INSULIN (HCC): ICD-10-CM

## 2024-09-21 DIAGNOSIS — E86.0 DEHYDRATION: ICD-10-CM

## 2024-09-21 DIAGNOSIS — E11.40 TYPE 2 DIABETES MELLITUS WITH DIABETIC NEUROPATHY, WITH LONG-TERM CURRENT USE OF INSULIN (HCC): ICD-10-CM

## 2024-09-21 DIAGNOSIS — R60.0 BILATERAL LEG EDEMA: ICD-10-CM

## 2024-09-21 DIAGNOSIS — E11.65 UNCONTROLLED TYPE 2 DIABETES MELLITUS WITH HYPERGLYCEMIA (HCC): ICD-10-CM

## 2024-09-21 LAB
ALBUMIN SERPL BCP-MCNC: 2.5 G/DL (ref 3.2–4.9)
ALBUMIN/GLOB SERPL: 0.7 G/DL
ALP SERPL-CCNC: 109 U/L (ref 30–99)
ALT SERPL-CCNC: 11 U/L (ref 2–50)
AMMONIA PLAS-SCNC: 97 UMOL/L (ref 11–45)
ANION GAP SERPL CALC-SCNC: 10 MMOL/L (ref 7–16)
AST SERPL-CCNC: 15 U/L (ref 12–45)
BASOPHILS # BLD AUTO: 0.8 % (ref 0–1.8)
BASOPHILS # BLD: 0.03 K/UL (ref 0–0.12)
BILIRUB SERPL-MCNC: 1.2 MG/DL (ref 0.1–1.5)
BUN SERPL-MCNC: 10 MG/DL (ref 8–22)
CALCIUM ALBUM COR SERPL-MCNC: 9 MG/DL (ref 8.5–10.5)
CALCIUM SERPL-MCNC: 7.8 MG/DL (ref 8.5–10.5)
CHLORIDE SERPL-SCNC: 111 MMOL/L (ref 96–112)
CO2 SERPL-SCNC: 15 MMOL/L (ref 20–33)
CREAT SERPL-MCNC: 1.3 MG/DL (ref 0.5–1.4)
EOSINOPHIL # BLD AUTO: 0.2 K/UL (ref 0–0.51)
EOSINOPHIL NFR BLD: 5.1 % (ref 0–6.9)
ERYTHROCYTE [DISTWIDTH] IN BLOOD BY AUTOMATED COUNT: 55.2 FL (ref 35.9–50)
GFR SERPLBLD CREATININE-BSD FMLA CKD-EPI: 46 ML/MIN/1.73 M 2
GLOBULIN SER CALC-MCNC: 3.8 G/DL (ref 1.9–3.5)
GLUCOSE SERPL-MCNC: 78 MG/DL (ref 65–99)
HCT VFR BLD AUTO: 26.3 % (ref 37–47)
HGB BLD-MCNC: 8.1 G/DL (ref 12–16)
IMM GRANULOCYTES # BLD AUTO: 0.03 K/UL (ref 0–0.11)
IMM GRANULOCYTES NFR BLD AUTO: 0.8 % (ref 0–0.9)
LACTATE SERPL-SCNC: 2.8 MMOL/L (ref 0.5–2)
LYMPHOCYTES # BLD AUTO: 1.12 K/UL (ref 1–4.8)
LYMPHOCYTES NFR BLD: 28.7 % (ref 22–41)
MAGNESIUM SERPL-MCNC: 1.4 MG/DL (ref 1.5–2.5)
MCH RBC QN AUTO: 26.2 PG (ref 27–33)
MCHC RBC AUTO-ENTMCNC: 30.8 G/DL (ref 32.2–35.5)
MCV RBC AUTO: 85.1 FL (ref 81.4–97.8)
MONOCYTES # BLD AUTO: 0.33 K/UL (ref 0–0.85)
MONOCYTES NFR BLD AUTO: 8.5 % (ref 0–13.4)
NEUTROPHILS # BLD AUTO: 2.19 K/UL (ref 1.82–7.42)
NEUTROPHILS NFR BLD: 56.1 % (ref 44–72)
NRBC # BLD AUTO: 0 K/UL
NRBC BLD-RTO: 0 /100 WBC (ref 0–0.2)
PHOSPHATE SERPL-MCNC: 3.1 MG/DL (ref 2.5–4.5)
PLATELET # BLD AUTO: 107 K/UL (ref 164–446)
PMV BLD AUTO: 11.5 FL (ref 9–12.9)
POTASSIUM SERPL-SCNC: 4.4 MMOL/L (ref 3.6–5.5)
PROT SERPL-MCNC: 6.3 G/DL (ref 6–8.2)
RBC # BLD AUTO: 3.09 M/UL (ref 4.2–5.4)
SODIUM SERPL-SCNC: 136 MMOL/L (ref 135–145)
TROPONIN T SERPL-MCNC: 30 NG/L (ref 6–19)
TSH SERPL DL<=0.005 MIU/L-ACNC: 3.18 UIU/ML (ref 0.38–5.33)
WBC # BLD AUTO: 3.9 K/UL (ref 4.8–10.8)

## 2024-09-21 PROCEDURE — 84484 ASSAY OF TROPONIN QUANT: CPT

## 2024-09-21 PROCEDURE — 700111 HCHG RX REV CODE 636 W/ 250 OVERRIDE (IP): Mod: UD | Performed by: STUDENT IN AN ORGANIZED HEALTH CARE EDUCATION/TRAINING PROGRAM

## 2024-09-21 PROCEDURE — 96365 THER/PROPH/DIAG IV INF INIT: CPT

## 2024-09-21 PROCEDURE — 93005 ELECTROCARDIOGRAM TRACING: CPT | Performed by: STUDENT IN AN ORGANIZED HEALTH CARE EDUCATION/TRAINING PROGRAM

## 2024-09-21 PROCEDURE — 85025 COMPLETE CBC W/AUTO DIFF WBC: CPT

## 2024-09-21 PROCEDURE — 84443 ASSAY THYROID STIM HORMONE: CPT

## 2024-09-21 PROCEDURE — 99285 EMERGENCY DEPT VISIT HI MDM: CPT

## 2024-09-21 PROCEDURE — 80053 COMPREHEN METABOLIC PANEL: CPT

## 2024-09-21 PROCEDURE — 84100 ASSAY OF PHOSPHORUS: CPT

## 2024-09-21 PROCEDURE — 71045 X-RAY EXAM CHEST 1 VIEW: CPT

## 2024-09-21 PROCEDURE — 700105 HCHG RX REV CODE 258: Mod: UD | Performed by: STUDENT IN AN ORGANIZED HEALTH CARE EDUCATION/TRAINING PROGRAM

## 2024-09-21 PROCEDURE — 83605 ASSAY OF LACTIC ACID: CPT

## 2024-09-21 PROCEDURE — 82140 ASSAY OF AMMONIA: CPT

## 2024-09-21 PROCEDURE — 83735 ASSAY OF MAGNESIUM: CPT

## 2024-09-21 PROCEDURE — 36415 COLL VENOUS BLD VENIPUNCTURE: CPT

## 2024-09-21 RX ORDER — SODIUM CHLORIDE, SODIUM LACTATE, POTASSIUM CHLORIDE, CALCIUM CHLORIDE 600; 310; 30; 20 MG/100ML; MG/100ML; MG/100ML; MG/100ML
1000 INJECTION, SOLUTION INTRAVENOUS ONCE
Status: COMPLETED | OUTPATIENT
Start: 2024-09-21 | End: 2024-09-21

## 2024-09-21 RX ORDER — MAGNESIUM SULFATE HEPTAHYDRATE 40 MG/ML
2 INJECTION, SOLUTION INTRAVENOUS ONCE
Status: COMPLETED | OUTPATIENT
Start: 2024-09-21 | End: 2024-09-22

## 2024-09-21 RX ADMIN — MAGNESIUM SULFATE HEPTAHYDRATE 2 G: 2 INJECTION, SOLUTION INTRAVENOUS at 23:27

## 2024-09-21 RX ADMIN — SODIUM CHLORIDE, POTASSIUM CHLORIDE, SODIUM LACTATE AND CALCIUM CHLORIDE 1000 ML: 600; 310; 30; 20 INJECTION, SOLUTION INTRAVENOUS at 21:53

## 2024-09-21 ASSESSMENT — FIBROSIS 4 INDEX: FIB4 SCORE: 3.87

## 2024-09-22 PROBLEM — R00.1 SYMPTOMATIC BRADYCARDIA: Status: ACTIVE | Noted: 2024-09-22

## 2024-09-22 PROBLEM — Z79.4 TYPE 2 DIABETES MELLITUS WITH DIABETIC NEUROPATHY, WITH LONG-TERM CURRENT USE OF INSULIN (HCC): Status: ACTIVE | Noted: 2022-08-04

## 2024-09-22 LAB
ANION GAP SERPL CALC-SCNC: 12 MMOL/L (ref 7–16)
BUN SERPL-MCNC: 11 MG/DL (ref 8–22)
CA-I SERPL-SCNC: 1 MMOL/L (ref 1.1–1.3)
CALCIUM SERPL-MCNC: 8 MG/DL (ref 8.5–10.5)
CHLORIDE SERPL-SCNC: 106 MMOL/L (ref 96–112)
CO2 SERPL-SCNC: 15 MMOL/L (ref 20–33)
CREAT SERPL-MCNC: 1.21 MG/DL (ref 0.5–1.4)
EKG IMPRESSION: NORMAL
GFR SERPLBLD CREATININE-BSD FMLA CKD-EPI: 50 ML/MIN/1.73 M 2
GLUCOSE BLD STRIP.AUTO-MCNC: 104 MG/DL (ref 65–99)
GLUCOSE BLD STRIP.AUTO-MCNC: 77 MG/DL (ref 65–99)
GLUCOSE BLD STRIP.AUTO-MCNC: 86 MG/DL (ref 65–99)
GLUCOSE BLD STRIP.AUTO-MCNC: 87 MG/DL (ref 65–99)
GLUCOSE SERPL-MCNC: 83 MG/DL (ref 65–99)
LACTATE SERPL-SCNC: 2.1 MMOL/L (ref 0.5–2)
MAGNESIUM SERPL-MCNC: 1.7 MG/DL (ref 1.5–2.5)
MAGNESIUM SERPL-MCNC: 1.9 MG/DL (ref 1.5–2.5)
POTASSIUM SERPL-SCNC: 4.5 MMOL/L (ref 3.6–5.5)
SODIUM SERPL-SCNC: 133 MMOL/L (ref 135–145)

## 2024-09-22 PROCEDURE — 51798 US URINE CAPACITY MEASURE: CPT

## 2024-09-22 PROCEDURE — 700102 HCHG RX REV CODE 250 W/ 637 OVERRIDE(OP): Performed by: STUDENT IN AN ORGANIZED HEALTH CARE EDUCATION/TRAINING PROGRAM

## 2024-09-22 PROCEDURE — 700111 HCHG RX REV CODE 636 W/ 250 OVERRIDE (IP): Performed by: STUDENT IN AN ORGANIZED HEALTH CARE EDUCATION/TRAINING PROGRAM

## 2024-09-22 PROCEDURE — A9270 NON-COVERED ITEM OR SERVICE: HCPCS

## 2024-09-22 PROCEDURE — 99291 CRITICAL CARE FIRST HOUR: CPT | Performed by: STUDENT IN AN ORGANIZED HEALTH CARE EDUCATION/TRAINING PROGRAM

## 2024-09-22 PROCEDURE — 97602 WOUND(S) CARE NON-SELECTIVE: CPT

## 2024-09-22 PROCEDURE — 83605 ASSAY OF LACTIC ACID: CPT

## 2024-09-22 PROCEDURE — 82962 GLUCOSE BLOOD TEST: CPT

## 2024-09-22 PROCEDURE — 770020 HCHG ROOM/CARE - TELE (206)

## 2024-09-22 PROCEDURE — A9270 NON-COVERED ITEM OR SERVICE: HCPCS | Performed by: STUDENT IN AN ORGANIZED HEALTH CARE EDUCATION/TRAINING PROGRAM

## 2024-09-22 PROCEDURE — 700102 HCHG RX REV CODE 250 W/ 637 OVERRIDE(OP)

## 2024-09-22 PROCEDURE — 83735 ASSAY OF MAGNESIUM: CPT

## 2024-09-22 PROCEDURE — 82330 ASSAY OF CALCIUM: CPT

## 2024-09-22 PROCEDURE — 36415 COLL VENOUS BLD VENIPUNCTURE: CPT

## 2024-09-22 PROCEDURE — 80048 BASIC METABOLIC PNL TOTAL CA: CPT

## 2024-09-22 RX ORDER — CALCIUM CARBONATE 500 MG/1
500 TABLET, CHEWABLE ORAL 4 TIMES DAILY PRN
Status: DISCONTINUED | OUTPATIENT
Start: 2024-09-22 | End: 2024-09-26 | Stop reason: HOSPADM

## 2024-09-22 RX ORDER — ATORVASTATIN CALCIUM 20 MG/1
20 TABLET, FILM COATED ORAL
Status: DISCONTINUED | OUTPATIENT
Start: 2024-09-22 | End: 2024-09-26 | Stop reason: HOSPADM

## 2024-09-22 RX ORDER — MAGNESIUM OXIDE 400 MG/1
400 TABLET ORAL EVERY MORNING
COMMUNITY

## 2024-09-22 RX ORDER — MAGNESIUM SULFATE HEPTAHYDRATE 40 MG/ML
2 INJECTION, SOLUTION INTRAVENOUS ONCE
Status: COMPLETED | OUTPATIENT
Start: 2024-09-22 | End: 2024-09-22

## 2024-09-22 RX ORDER — FERROUS SULFATE 325(65) MG
325 TABLET ORAL 2 TIMES DAILY
COMMUNITY

## 2024-09-22 RX ORDER — ACETAMINOPHEN 325 MG/1
650 TABLET ORAL EVERY 6 HOURS PRN
Status: DISCONTINUED | OUTPATIENT
Start: 2024-09-22 | End: 2024-09-26 | Stop reason: HOSPADM

## 2024-09-22 RX ORDER — ENOXAPARIN SODIUM 100 MG/ML
40 INJECTION SUBCUTANEOUS DAILY
Status: DISCONTINUED | OUTPATIENT
Start: 2024-09-22 | End: 2024-09-22

## 2024-09-22 RX ORDER — HYDRALAZINE HYDROCHLORIDE 20 MG/ML
10 INJECTION INTRAMUSCULAR; INTRAVENOUS EVERY 4 HOURS PRN
Status: DISCONTINUED | OUTPATIENT
Start: 2024-09-22 | End: 2024-09-26 | Stop reason: HOSPADM

## 2024-09-22 RX ORDER — ASCORBIC ACID 500 MG
500 TABLET ORAL EVERY MORNING
COMMUNITY

## 2024-09-22 RX ORDER — PANTOPRAZOLE SODIUM 20 MG/1
20 TABLET, DELAYED RELEASE ORAL EVERY MORNING
Status: DISCONTINUED | OUTPATIENT
Start: 2024-09-22 | End: 2024-09-22

## 2024-09-22 RX ORDER — INSULIN LISPRO 100 [IU]/ML
1-6 INJECTION, SOLUTION INTRAVENOUS; SUBCUTANEOUS
Status: DISCONTINUED | OUTPATIENT
Start: 2024-09-22 | End: 2024-09-26 | Stop reason: HOSPADM

## 2024-09-22 RX ORDER — LACTULOSE 10 G/15ML
30 SOLUTION ORAL 3 TIMES DAILY
Status: DISCONTINUED | OUTPATIENT
Start: 2024-09-22 | End: 2024-09-26 | Stop reason: HOSPADM

## 2024-09-22 RX ORDER — DEXTROSE MONOHYDRATE 25 G/50ML
25 INJECTION, SOLUTION INTRAVENOUS
Status: DISCONTINUED | OUTPATIENT
Start: 2024-09-22 | End: 2024-09-26 | Stop reason: HOSPADM

## 2024-09-22 RX ORDER — FELODIPINE 5 MG/1
5 TABLET, EXTENDED RELEASE ORAL DAILY
Status: ON HOLD | COMMUNITY
End: 2024-09-26

## 2024-09-22 RX ORDER — INSULIN LISPRO 100 [IU]/ML
0.2 INJECTION, SOLUTION INTRAVENOUS; SUBCUTANEOUS
Status: DISCONTINUED | OUTPATIENT
Start: 2024-09-22 | End: 2024-09-25

## 2024-09-22 RX ORDER — POTASSIUM CHLORIDE 1500 MG/1
20 TABLET, EXTENDED RELEASE ORAL EVERY MORNING
COMMUNITY

## 2024-09-22 RX ADMIN — LACTULOSE 30 ML: 10 SOLUTION ORAL at 06:10

## 2024-09-22 RX ADMIN — ANTACID TABLETS 500 MG: 500 TABLET, CHEWABLE ORAL at 21:57

## 2024-09-22 RX ADMIN — OMEPRAZOLE 20 MG: 20 CAPSULE, DELAYED RELEASE ORAL at 06:10

## 2024-09-22 RX ADMIN — RIFAXIMIN 550 MG: 550 TABLET ORAL at 06:10

## 2024-09-22 RX ADMIN — MAGNESIUM SULFATE HEPTAHYDRATE 2 G: 2 INJECTION, SOLUTION INTRAVENOUS at 02:48

## 2024-09-22 RX ADMIN — APIXABAN 5 MG: 5 TABLET, FILM COATED ORAL at 17:00

## 2024-09-22 RX ADMIN — APIXABAN 5 MG: 5 TABLET, FILM COATED ORAL at 06:11

## 2024-09-22 RX ADMIN — LACTULOSE 30 ML: 10 SOLUTION ORAL at 12:16

## 2024-09-22 RX ADMIN — RIFAXIMIN 550 MG: 550 TABLET ORAL at 17:00

## 2024-09-22 RX ADMIN — ATORVASTATIN CALCIUM 20 MG: 20 TABLET, FILM COATED ORAL at 21:56

## 2024-09-22 RX ADMIN — LACTULOSE 30 ML: 10 SOLUTION ORAL at 17:00

## 2024-09-22 RX ADMIN — ACETAMINOPHEN 650 MG: 325 TABLET ORAL at 21:57

## 2024-09-22 ASSESSMENT — CHA2DS2 SCORE
CHA2DS2 VASC SCORE: 3
VASCULAR DISEASE: NO
DIABETES: YES
PRIOR STROKE OR TIA OR THROMBOEMBOLISM: NO
HYPERTENSION: NO
CHF OR LEFT VENTRICULAR DYSFUNCTION: NO
AGE 75 OR GREATER: NO
AGE 65 TO 74: NO
CHA2DS2 VASC SCORE: 2
VASCULAR DISEASE: NO
SEX: FEMALE
HYPERTENSION: YES
DIABETES: YES
CHF OR LEFT VENTRICULAR DYSFUNCTION: NO
AGE 75 OR GREATER: NO
PRIOR STROKE OR TIA OR THROMBOEMBOLISM: NO
AGE 65 TO 74: NO
SEX: FEMALE

## 2024-09-22 ASSESSMENT — ENCOUNTER SYMPTOMS
SHORTNESS OF BREATH: 0
FEVER: 0
NAUSEA: 0
CHILLS: 0
DIARRHEA: 0
COUGH: 0
ABDOMINAL PAIN: 0
VOMITING: 0

## 2024-09-22 ASSESSMENT — COGNITIVE AND FUNCTIONAL STATUS - GENERAL
DRESSING REGULAR LOWER BODY CLOTHING: A LOT
DAILY ACTIVITIY SCORE: 16
TOILETING: A LOT
MOVING FROM LYING ON BACK TO SITTING ON SIDE OF FLAT BED: A LITTLE
MOBILITY SCORE: 13
WALKING IN HOSPITAL ROOM: TOTAL
CLIMB 3 TO 5 STEPS WITH RAILING: TOTAL
TURNING FROM BACK TO SIDE WHILE IN FLAT BAD: A LITTLE
STANDING UP FROM CHAIR USING ARMS: A LOT
SUGGESTED CMS G CODE MODIFIER DAILY ACTIVITY: CK
PERSONAL GROOMING: A LITTLE
MOVING TO AND FROM BED TO CHAIR: A LITTLE
HELP NEEDED FOR BATHING: A LOT
SUGGESTED CMS G CODE MODIFIER MOBILITY: CL
DRESSING REGULAR UPPER BODY CLOTHING: A LITTLE

## 2024-09-22 ASSESSMENT — SOCIAL DETERMINANTS OF HEALTH (SDOH)
WITHIN THE PAST 12 MONTHS, YOU WORRIED THAT YOUR FOOD WOULD RUN OUT BEFORE YOU GOT THE MONEY TO BUY MORE: NEVER TRUE
WITHIN THE LAST YEAR, HAVE TO BEEN RAPED OR FORCED TO HAVE ANY KIND OF SEXUAL ACTIVITY BY YOUR PARTNER OR EX-PARTNER?: NO
WITHIN THE LAST YEAR, HAVE YOU BEEN HUMILIATED OR EMOTIONALLY ABUSED IN OTHER WAYS BY YOUR PARTNER OR EX-PARTNER?: NO
WITHIN THE PAST 12 MONTHS, THE FOOD YOU BOUGHT JUST DIDN'T LAST AND YOU DIDN'T HAVE MONEY TO GET MORE: NEVER TRUE
IN THE PAST 12 MONTHS, HAS THE ELECTRIC, GAS, OIL, OR WATER COMPANY THREATENED TO SHUT OFF SERVICE IN YOUR HOME?: NO
WITHIN THE LAST YEAR, HAVE YOU BEEN AFRAID OF YOUR PARTNER OR EX-PARTNER?: NO
WITHIN THE LAST YEAR, HAVE YOU BEEN KICKED, HIT, SLAPPED, OR OTHERWISE PHYSICALLY HURT BY YOUR PARTNER OR EX-PARTNER?: NO

## 2024-09-22 ASSESSMENT — LIFESTYLE VARIABLES
HOW MANY TIMES IN THE PAST YEAR HAVE YOU HAD 5 OR MORE DRINKS IN A DAY: 0
CONSUMPTION TOTAL: NEGATIVE
TOTAL SCORE: 0
AVERAGE NUMBER OF DAYS PER WEEK YOU HAVE A DRINK CONTAINING ALCOHOL: 0
EVER HAD A DRINK FIRST THING IN THE MORNING TO STEADY YOUR NERVES TO GET RID OF A HANGOVER: NO
ALCOHOL_USE: NO
ON A TYPICAL DAY WHEN YOU DRINK ALCOHOL HOW MANY DRINKS DO YOU HAVE: 0
EVER FELT BAD OR GUILTY ABOUT YOUR DRINKING: NO
HAVE YOU EVER FELT YOU SHOULD CUT DOWN ON YOUR DRINKING: NO
DOES PATIENT WANT TO STOP DRINKING: NO
HAVE PEOPLE ANNOYED YOU BY CRITICIZING YOUR DRINKING: NO
TOTAL SCORE: 0
TOTAL SCORE: 0

## 2024-09-22 ASSESSMENT — PAIN DESCRIPTION - PAIN TYPE
TYPE: ACUTE PAIN

## 2024-09-22 ASSESSMENT — PAIN SCALES - PAIN ASSESSMENT IN ADVANCED DEMENTIA (PAINAD)
CONSOLABILITY: DISTRACTED OR REASSURED BY VOICE/TOUCH
BREATHING: NORMAL
FACIALEXPRESSION: SMILING OR INEXPRESSIVE
BODYLANGUAGE: TENSE, DISTRESSED PACING, FIDGETING
TOTALSCORE: 2

## 2024-09-22 ASSESSMENT — FIBROSIS 4 INDEX: FIB4 SCORE: 2.71

## 2024-09-22 NOTE — CARE PLAN
"The patient is Stable - Low risk of patient condition declining or worsening    Shift Goals  Clinical Goals: monitor mentation, skin integrity, telemetry, trend vitals  Patient Goals: \"snacks\"  Family Goals: unable to assess    Progress made toward(s) clinical / shift goals:      Problem: Knowledge Deficit - Standard  Goal: Patient and family/care givers will demonstrate understanding of plan of care, disease process/condition, diagnostic tests and medications  Outcome: Progressing    Patient is educated of disease process and condition despite mentation. Treatment team has included patient in plan of care. All medications indications and side effects are explained despite mentation. Patient is encouraged to ask questions.     Problem: Fall Risk  Goal: Patient will remain free from falls  Outcome: Progressing    Patient's risk for injury and falls assessed. Appropriate safety precautions in place including bed alarm. Patient educated to utilize call light for needs but does not demonstrate understanding. Mentation assessed and at baseline per [facility].        Patient is not progressing towards the following goals: N/A      "

## 2024-09-22 NOTE — H&P
Hospital Medicine History & Physical Note    Date of Service  9/22/2024    Primary Care Physician  Anum Gatica M.D.    Code Status  Full Code    Chief Complaint  Chief Complaint   Patient presents with    Irregular Heart Beat     bradycardia       History of Presenting Illness  April Alicia is a 64 y.o. female who presented 9/21/2024 with presyncope, altered mental status.  PMH of A-fib on anticoagulation, alcoholic cirrhosis, hypertension, insulin-dependent diabetes, dyslipidemia, GERD.  Patient currently at [facility], she had a presyncopal episode tonight and found to have a heart rate down to the 30s.  Upon EMS arrival she was still in the 30s and they gave her 200s of atropine, improved to the 40s but she remained altered and confused.  On arrival in the ED here she was given a liter fluid bolus and 2 mg of IV magnesium, her mentation improved further and her heart rate is now up to the 50s.    She was admitted to this facility last month for toxic metabolic encephalopathy secondary to opiate overdose and hepatic encephalopathy, was discharged to SNF.    In the ED afebrile, heart rate in the 50s on my evaluation.  Labs showed creatinine of 1.46, magnesium 1.4, lactic acid 2.8, ammonia 97.    I discussed the plan of care with patient, bedside RN, and EDP .    Review of Systems  Review of Systems   Constitutional:  Negative for chills and fever.   Respiratory:  Negative for cough and shortness of breath.    Cardiovascular:  Negative for chest pain.   Gastrointestinal:  Negative for abdominal pain, diarrhea, nausea and vomiting.   Genitourinary:  Negative for dysuria and urgency.       Past Medical History   has a past medical history of Abnormal finding of lung (12/27/2022), Acute exacerbation of chronic obstructive pulmonary disease (COPD) ([facility]) (01/27/2020), Alcoholic cirrhosis ([facility]), Arthritis, ASTHMA, Atrial fibrillation ([facility]), Dry eyes (11/16/2017), Edentulous, Emphysema of lung ([facility]), H/O:  hysterectomy (12/05/2019), Heart burn, Hepatic encephalopathy (HCC) (12/27/2022), Hepatitis C infection (07/28/2022), History of rheumatic fever (09/04/2017), Hypertension, 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HCC), Medication overuse headache (08/15/2017), Mitral regurgitation, Pancytopenia (HCC) (07/26/2022), Pneumonia (02/2020), Primary osteoarthritis of left knee (08/25/2020), Prosthetic joint infection (HCC) (2018), Protein-calorie malnutrition, severe ([facility]) (08/01/2022), Right leg DVT ([facility]) (2018), Seizure disorder ([facility]), Septic arthritis of knee, left ([facility]) (07/19/2022), Septic arthritis of shoulder, left ([facility]) (07/17/2022), Septic shock due to Pseudomonas species ([facility]) (10/30/2023), and Type 2 diabetes mellitus ([facility]) (12/27/2022).    Surgical History   has a past surgical history that includes gyn surgery (1982); gyn surgery (2003); colonoscopy with clipping (10/28/2015); colonoscopy with sclerotherapy (10/28/2015); colonoscopy with tattooing (10/28/2015); irrigation & debridement ortho (Right, 9/3/2017); knee arthroplasty total (Right, 2018); pr total knee arthroplasty (Left, 8/24/2020); irrigation & debridement general (Left, 7/17/2022); pr revise knee joint replace,all parts (Left, 7/19/2022); pr revise knee joint replace,all parts (Left, 12/28/2022); irrigation & debridement general (Left, 12/28/2022); pr total knee arthroplasty (Left, 10/30/2023); and knee amputation above (Left, 1/16/2024).     Family History  family history includes Alcohol abuse in her brother; Dementia in her brother; Diabetes in her brother, brother, and mother; Heart Attack (age of onset: 45) in her father; Seizures in her father.   Family history reviewed with patient. There is no family history that is pertinent to the chief complaint.     Social History   reports that she quit smoking about 7 years ago. Her smoking use included cigarettes. She quit smokeless tobacco use about 3 years ago. She reports that she does not currently use  alcohol. She reports that she does not currently use drugs.    Allergies  Allergies   Allergen Reactions    Meropenem Swelling     Pt had rxt to meropenem June 2024 (marked tongue swelling and right facial/periorbital edema)    Tuberculin Tests Unspecified     Per MAR from [facility]       Medications  Prior to Admission Medications   Prescriptions Last Dose Informant Patient Reported? Taking?   acetaminophen (TYLENOL) 325 MG Tab  MAR from Other Facility No No   Sig: Take 2 Tablets by mouth every 8 hours.   albuterol 108 (90 Base) MCG/ACT Aero Soln inhalation aerosol  MAR from Other Facility Yes No   Sig: Inhale 2 Puffs every 4 hours. Every 4-6 hours prn   apixaban (ELIQUIS) 5mg Tab  MAR from Other Facility No No   Sig: Take 1 Tablet by mouth 2 times a day. Indications: Thromboembolism secondary to Atrial Fibrillation   atorvastatin (LIPITOR) 20 MG Tab  MAR from Other Facility No No   Sig: Take 1 Tablet by mouth every evening.   Patient taking differently: Take 20 mg by mouth at bedtime.   clotrimazole-betamethasone (LOTRISONE) 1-0.05 % Cream  MAR from Other Facility Yes No   Sig: Apply 1 Application topically 2 times a day. Apply to right arm rash 2 times daily for 7 days   furosemide (LASIX) 40 MG Tab   No No   Sig: Take 1 Tablet by mouth 2 times a day.   gabapentin (NEURONTIN) 400 MG Cap  MAR from Other Facility Yes No   Sig: Take 400 mg by mouth 2 times a day.   insulin GLARGINE (LANTUS,SEMGLEE) 100 UNIT/ML Solution   No No   Sig: Inject 10 Units under the skin every evening.   insulin regular (HUMULIN R) 100 Unit/mL Solution   No No   Sig: Inject 1-6 Units under the skin 4 Times a Day,Before Meals and at Bedtime.   lactulose 20 GM/30ML Solution  MAR from Other Facility No No   Sig: Take 30 mL by mouth 3 times a day. Hold dose if 4 or more soft or loose stools in the last 24 hours, please gradually increase amount per dose by 5mls if you do not have at least 2 stools per day.   losartan (COZAAR) 100 MG Tab  MAR  from Other Facility Yes No   Sig: Take 100 mg by mouth every morning.   metoprolol tartrate 75 MG Tab   No No   Sig: Take 75 mg by mouth 2 times a day.   nystatin (MYCOSTATIN) powder  MAR from Other Facility Yes No   Sig: Apply 1 Application topically 3 times a day. For 10 days   ondansetron (ZOFRAN ODT) 4 MG TABLET DISPERSIBLE  MAR from Other Facility No No   Sig: Take 1 Tablet by mouth every four hours as needed for Nausea/Vomiting (give PO if no IV route available).   pantoprazole (PROTONIX) 20 MG tablet  MAR from Other Facility Yes No   Sig: Take 20 mg by mouth every morning.   riFAXIMin (XIFAXAN) 550 MG Tab tablet  MAR from Other Facility Yes No   Sig: Take 550 mg by mouth 2 times a day.      Facility-Administered Medications: None       Physical Exam  Temp:  [36 °C (96.8 °F)] 36 °C (96.8 °F)  Pulse:  [32-55] 55  Resp:  [8-21] 18  BP: ()/(56-77) 119/68  SpO2:  [94 %-100 %] 94 %  Blood Pressure: 125/74   Temperature: 36 °C (96.8 °F)   Pulse: (!) 50   Respiration: 19   Pulse Oximetry: 98 %       Physical Exam  Constitutional:       Appearance: She is obese.   HENT:      Head: Normocephalic and atraumatic.      Mouth/Throat:      Mouth: Mucous membranes are moist.      Pharynx: No oropharyngeal exudate or posterior oropharyngeal erythema.   Cardiovascular:      Rate and Rhythm: Normal rate and regular rhythm.      Pulses: Normal pulses.      Heart sounds: Normal heart sounds. No murmur heard.  Pulmonary:      Effort: Pulmonary effort is normal. No respiratory distress.      Breath sounds: Normal breath sounds.   Musculoskeletal:         General: No tenderness. Normal range of motion.      Right lower leg: Edema present.      Left lower leg: Edema present.   Skin:     General: Skin is warm and dry.   Neurological:      General: No focal deficit present.      Mental Status: She is alert and oriented to person, place, and time.      Comments: Alert and oriented at this time but still with intermittent episodes  "of confusion   Psychiatric:         Mood and Affect: Mood normal.         Laboratory:  Recent Labs     09/21/24 2146   WBC 3.9*   RBC 3.09*   HEMOGLOBIN 8.1*   HEMATOCRIT 26.3*   MCV 85.1   MCH 26.2*   MCHC 30.8*   RDW 55.2*   PLATELETCT 107*   MPV 11.5     Recent Labs     09/21/24 2146   SODIUM 136   POTASSIUM 4.4   CHLORIDE 111   CO2 15*   GLUCOSE 78   BUN 10   CREATININE 1.30   CALCIUM 7.8*     Recent Labs     09/21/24 2146   ALTSGPT 11   ASTSGOT 15   ALKPHOSPHAT 109*   TBILIRUBIN 1.2   GLUCOSE 78         No results for input(s): \"NTPROBNP\" in the last 72 hours.      Recent Labs     09/21/24 2146   TROPONINT 30*       Imaging:  DX-CHEST-PORTABLE (1 VIEW)   Final Result      Hypoinflation with crowding of the bronchovascular markings.          X-Ray:  I have personally reviewed the images and compared with prior images. and My impression is: Chronic calcified lymph nodes, no acute process  EKG:  I have personally reviewed the images and compared with prior images. and My impression is: A-fib, bradycardia    Assessment/Plan:  Justification for Admission Status  I anticipate this patient will require at least two midnights for appropriate medical management, necessitating inpatient admission because altered mental status    * Symptomatic bradycardia- (present on admission)  Assessment & Plan  Presyncopal episode at SNF, heart rate was found to be in the 30s given atropine by EMS improved to the 40s.  Given fluids and magnesium on arrival here she was found to be hypomagnesemic with improvement in her heart rate and symptoms  Patient has a long history of medication noncompliance, discharged to SNF following admission last month where she has been taking her medication for the first time in a while  Secondary to metoprolol which she is on for A-fib.  Hold metoprolol, discontinue or decrease dose on discharge  Hypomagnesemia likely also playing a role.  Replete and monitor    ELIZABETH (acute kidney injury) (HCC)- " (present on admission)  Assessment & Plan  Creatinine on presentation 1.3, baseline on most recent labs was 0.6-1  She is in volume overload and has a history of cirrhosis.  At presentation she had.  Dehydrated, given 1 L of fluids in the ED  Hold furosemide, repeat BMP ordered if improved with fluids continue holding diuretics  If no improvement/worsens consider hepatorenal syndrome  BMP ordered continue monitor, avoid nephrotoxic agents    Hypomagnesemia- (present on admission)  Assessment & Plan  1.4 on presentation.  IV repletion, levels ordered continue monitoring    Hepatic encephalopathy (HCC)- (present on admission)  Assessment & Plan  Her mentation did improve per EDP following improvement in her heart rate however she does remain confused  Ammonia is elevated.  She does not remember when the last time she had a bowel movement was  Will continue with lactulose and rifaximin    Type 2 diabetes mellitus with diabetic neuropathy, with long-term current use of insulin (HCC)- (present on admission)  Assessment & Plan  Continue insulin.  Hold gabapentin due to ELIZABETH    Paroxysmal atrial fibrillation- (present on admission)  Assessment & Plan  Hold metoprolol due to symptomatic bradycardia.  Continue apixaban    Normocytic anemia- (present on admission)  Assessment & Plan  Chronic anemia, baseline.  CBC ordered to continue monitoring    Dyslipidemia- (present on admission)  Assessment & Plan  Continue statin    Primary hypertension- (present on admission)  Assessment & Plan  Hold metoprolol, losartan due to bradycardia and ELIZABETH  As needed antihypertensives ordered    Thrombocytopenia (HCC)- (present on admission)  Assessment & Plan  Secondary to cirrhosis, baseline.  CBC ordered continue monitoring.  No signs of bleeding    Alcoholic cirrhosis of liver (HCC)- (present on admission)  Assessment & Plan  She does have volume overload at this time.  Will hold furosemide due to ELIZABETH  Says she no longer drinks  alcohol    Patient is critically ill.   The patient continues to have: Symptomatic bradycardia, hypomagnesemia, stage 2 hepatic encephalopathy, ELIZABETH, volume overload  If untreated there is a high chance of deterioration into failure arrhythmia, worsening hepatic encephalopathy and coma, And eventually death.   The critical care that I am providing today is: Continue close monitoring on telemetry holding her home meds.  Close monitoring of her electrolytes which are playing a role in her symptoms.  Treating her hepatic encephalopathy  The critical that has been undertaken is medically complex.   There has been no overlap in critical care time.   Critical Care Time not including procedures: 55 minutes    VTE prophylaxis: therapeutic anticoagulation with apixaban

## 2024-09-22 NOTE — PROGRESS NOTES
Monitor Summary:     Rhythm: AF  Rate: 61-73  Ectopy: N/A  Measurements: -/0.08/-              12 Hour Chart Check

## 2024-09-22 NOTE — CARE PLAN
The patient is Stable - Low risk of patient condition declining or worsening    Shift Goals  Clinical Goals: monitor vitals and labs, maintain/improve skin integrity  Patient Goals: Comfort  Family Goals: Updates    Progress made toward(s) clinical / shift goals:    Problem: Knowledge Deficit - Standard  Goal: Patient and family/care givers will demonstrate understanding of plan of care, disease process/condition, diagnostic tests and medications  Outcome: Progressing     Problem: Pain - Standard  Goal: Alleviation of pain or a reduction in pain to the patient’s comfort goal  Outcome: Progressing     Problem: Skin Integrity  Goal: Skin integrity is maintained or improved  Outcome: Progressing     Problem: Fall Risk  Goal: Patient will remain free from falls  Outcome: Progressing       Patient is not progressing towards the following goals:

## 2024-09-22 NOTE — ED NOTES
Bedside report received by DANYEL Enriquez    Oxygen:RA  Fall Risk status: bed in lowest position/locked, call light within reach  Sinus bradycardia on monitor  Pending: lab results

## 2024-09-22 NOTE — ED PROVIDER NOTES
ER Provider Note    Scribed for Dr. Gregorio Kay MD. by Jayshree Pepe. 9/21/2024  8:50 PM    Primary Care Provider: Anmu Gatica M.D.    CHIEF COMPLAINT  Chief Complaint   Patient presents with    Irregular Heart Beat     bradycardia       EXTERNAL RECORDS REVIEWED  Other Patient was admitted from 6/3/2024-6/11/2024 for irregular heart beat.     HPI/ROS  LIMITATION TO HISTORY   Select: : None    OUTSIDE HISTORIAN(S):  EMS contributed to the patient's history.     April Alicia is a 64 y.o. female who presents to the ED via EMS from [facility] for irregular heartbeat onset prior to arrival. EMS reports the patient was symptomatic with a heart rate in the 30s-40s. They report she has a history of atrial fibrillation.       PAST MEDICAL HISTORY  Past Medical History:   Diagnosis Date    Abnormal finding of lung 12/27/2022    Acute exacerbation of chronic obstructive pulmonary disease (COPD) ([facility]) 01/27/2020    Alcoholic cirrhosis ([facility])     Arthritis     OA in knees    ASTHMA     Atrial fibrillation ([facility])     Dry eyes 11/16/2017    Edentulous     upper and lower dentures    Emphysema of lung ([facility])     H/O: hysterectomy 12/05/2019    Heart burn     left knee    Hepatic encephalopathy (HCC) 12/27/2022    Hepatitis C infection 07/28/2022    History of rheumatic fever 09/04/2017    Hypertension     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HCC)     Recurrent, L knee, x4 with surgical washout    Medication overuse headache 08/15/2017    Mitral regurgitation     Pancytopenia ([facility]) 07/26/2022    Pneumonia 02/2020    Primary osteoarthritis of left knee 08/25/2020    Prosthetic joint infection ([facility]) 2018    Right knee after total knee    Protein-calorie malnutrition, severe (HCC) 08/01/2022    Right leg DVT ([facility]) 2018    acute, resolved    Seizure disorder ([facility])     Septic arthritis of knee, left (HCC) 07/19/2022    Septic arthritis of shoulder, left (HCC) 07/17/2022    Septic shock due to Pseudomonas  species ([facility]) 10/30/2023    Type 2 diabetes mellitus ([facility]) 12/27/2022       SURGICAL HISTORY  Past Surgical History:   Procedure Laterality Date    KNEE AMPUTATION ABOVE Left 1/16/2024    Procedure: LEFT ABOVE KNEE AMPUTATION;  Surgeon: Obdulio Gray M.D.;  Location: [facility];  Service: Orthopedics    PB TOTAL KNEE ARTHROPLASTY Left 10/30/2023    Procedure: LEFT TOTAL KNEE ARTHROPLASTY EXPLANTATION, INSERTION OF ANTIBIOTIC SPACER, AND APPLICATION OF INCISIONAL WOUND VACUUM;  Surgeon: Wild Luz M.D.;  Location: [facility];  Service: Orthopedics    PB REVISE KNEE JOINT REPLACE,ALL PARTS Left 12/28/2022    Procedure: REVISION, TOTAL ARTHROPLASTY, KNEE, ALL COMPONENTS-LEFT;  Surgeon: Wild Luz M.D.;  Location: [facility];  Service: Orthopedics    IRRIGATION & DEBRIDEMENT GENERAL Left 12/28/2022    Procedure: IRRIGATION AND DEBRIDEMENT, WOUND;  Surgeon: Wild Luz M.D.;  Location: [facility];  Service: Orthopedics    PB REVISE KNEE JOINT REPLACE,ALL PARTS Left 7/19/2022    Procedure: REVISION, TOTAL ARTHROPLASTY, KNEE, ALL COMPONENTS - ANTIBIOTIC SPACER;  Surgeon: Wild Luz M.D.;  Location: [facility];  Service: Orthopedics    IRRIGATION & DEBRIDEMENT GENERAL Left 7/17/2022    Procedure: IRRIGATION AND DEBRIDEMENT, SHOULDER;  Surgeon: Obdulio Gray M.D.;  Location: [facility];  Service: Orthopedics    PB TOTAL KNEE ARTHROPLASTY Left 8/24/2020    Procedure: ARTHROPLASTY, KNEE, TOTAL;  Surgeon: Víctor Faustin M.D.;  Location: [facility];  Service: Orthopedics    KNEE ARTHROPLASTY TOTAL Right 2018    IRRIGATION & DEBRIDEMENT ORTHO Right 9/3/2017    Procedure: IRRIGATION & DEBRIDEMENT ORTHO, POLY EXCHANGE;  Surgeon: Jerome Russell M.D.;  Location: [facility];  Service: Orthopedics    COLONOSCOPY WITH CLIPPING  10/28/2015    Procedure: COLONOSCOPY WITH CLIPPING;  Surgeon: Ruben Colon M.D.;   Location: [facility];  Service:     COLONOSCOPY WITH SCLEROTHERAPY  10/28/2015    Procedure: COLONOSCOPY WITH SCLEROTHERAPY;  Surgeon: Ruben Colon M.D.;  Location: [facility];  Service:     COLONOSCOPY WITH TATTOOING  10/28/2015    Procedure: COLONOSCOPY WITH TATTOOING;  Surgeon: Ruben Colon M.D.;  Location: [facility];  Service:     GYN SURGERY  2003    hysteroscoopy    GYN SURGERY  1982    tubal ligation       FAMILY HISTORY  Family History   Problem Relation Age of Onset    Diabetes Mother     Seizures Father     Heart Attack Father 45    Diabetes Brother     Alcohol abuse Brother     Dementia Brother     Diabetes Brother        SOCIAL HISTORY   reports that she quit smoking about 7 years ago. Her smoking use included cigarettes. She quit smokeless tobacco use about 3 years ago. She reports that she does not currently use alcohol. She reports that she does not currently use drugs.    CURRENT MEDICATIONS  Previous Medications    ACETAMINOPHEN (TYLENOL) 325 MG TAB    Take 2 Tablets by mouth every 8 hours.    ALBUTEROL 108 (90 BASE) MCG/ACT AERO SOLN INHALATION AEROSOL    Inhale 2 Puffs every 4 hours. Every 4-6 hours prn    APIXABAN (ELIQUIS) 5MG TAB    Take 1 Tablet by mouth 2 times a day. Indications: Thromboembolism secondary to Atrial Fibrillation    ATORVASTATIN (LIPITOR) 20 MG TAB    Take 1 Tablet by mouth every evening.    CLOTRIMAZOLE-BETAMETHASONE (LOTRISONE) 1-0.05 % CREAM    Apply 1 Application topically 2 times a day. Apply to right arm rash 2 times daily for 7 days    FUROSEMIDE (LASIX) 40 MG TAB    Take 1 Tablet by mouth 2 times a day.    GABAPENTIN (NEURONTIN) 400 MG CAP    Take 400 mg by mouth 2 times a day.    INSULIN GLARGINE (LANTUS,SEMGLEE) 100 UNIT/ML SOLUTION    Inject 10 Units under the skin every evening.    INSULIN REGULAR (HUMULIN R) 100 UNIT/ML SOLUTION    Inject 1-6 Units under the skin 4 Times a Day,Before Meals and at Bedtime.     "LACTULOSE 20 GM/30ML SOLUTION    Take 30 mL by mouth 3 times a day. Hold dose if 4 or more soft or loose stools in the last 24 hours, please gradually increase amount per dose by 5mls if you do not have at least 2 stools per day.    LOSARTAN (COZAAR) 100 MG TAB    Take 100 mg by mouth every morning.    METOPROLOL TARTRATE 75 MG TAB    Take 75 mg by mouth 2 times a day.    NYSTATIN (MYCOSTATIN) POWDER    Apply 1 Application topically 3 times a day. For 10 days    ONDANSETRON (ZOFRAN ODT) 4 MG TABLET DISPERSIBLE    Take 1 Tablet by mouth every four hours as needed for Nausea/Vomiting (give PO if no IV route available).    PANTOPRAZOLE (PROTONIX) 20 MG TABLET    Take 20 mg by mouth every morning.    RIFAXIMIN (XIFAXAN) 550 MG TAB TABLET    Take 550 mg by mouth 2 times a day.       ALLERGIES  Meropenem and Tuberculin tests    PHYSICAL EXAM  BP 97/61   Pulse (!) 41   Temp 36 °C (96.8 °F) (Temporal)   Resp 16   Ht 1.702 m (5' 7\")   Wt 104 kg (230 lb)   LMP 06/02/2008   SpO2 97%   BMI 36.02 kg/m²     Physical Exam  Vitals and nursing note reviewed.   Constitutional:       Appearance: She is well-developed.   HENT:      Head: Normocephalic.   Eyes:      Extraocular Movements: Extraocular movements intact.      Pupils: Pupils are equal, round, and reactive to light.   Cardiovascular:      Rate and Rhythm: Normal rate and regular rhythm.   Pulmonary:      Effort: Pulmonary effort is normal.      Breath sounds: Normal breath sounds.   Abdominal:      Palpations: Abdomen is soft.      Tenderness: There is no abdominal tenderness.   Musculoskeletal:      Cervical back: Normal range of motion.   Neurological:      Mental Status: She is alert and oriented to person, place, and time.       DIAGNOSTIC STUDIES & PROCEDURES    Labs:   Results for orders placed or performed during the hospital encounter of 09/21/24   CBC WITH DIFFERENTIAL   Result Value Ref Range    WBC 3.9 (L) 4.8 - 10.8 K/uL    RBC 3.09 (L) 4.20 - 5.40 M/uL "    Hemoglobin 8.1 (L) 12.0 - 16.0 g/dL    Hematocrit 26.3 (L) 37.0 - 47.0 %    MCV 85.1 81.4 - 97.8 fL    MCH 26.2 (L) 27.0 - 33.0 pg    MCHC 30.8 (L) 32.2 - 35.5 g/dL    RDW 55.2 (H) 35.9 - 50.0 fL    Platelet Count 107 (L) 164 - 446 K/uL    MPV 11.5 9.0 - 12.9 fL    Neutrophils-Polys 56.10 44.00 - 72.00 %    Lymphocytes 28.70 22.00 - 41.00 %    Monocytes 8.50 0.00 - 13.40 %    Eosinophils 5.10 0.00 - 6.90 %    Basophils 0.80 0.00 - 1.80 %    Immature Granulocytes 0.80 0.00 - 0.90 %    Nucleated RBC 0.00 0.00 - 0.20 /100 WBC    Neutrophils (Absolute) 2.19 1.82 - 7.42 K/uL    Lymphs (Absolute) 1.12 1.00 - 4.80 K/uL    Monos (Absolute) 0.33 0.00 - 0.85 K/uL    Eos (Absolute) 0.20 0.00 - 0.51 K/uL    Baso (Absolute) 0.03 0.00 - 0.12 K/uL    Immature Granulocytes (abs) 0.03 0.00 - 0.11 K/uL    NRBC (Absolute) 0.00 K/uL   COMP METABOLIC PANEL   Result Value Ref Range    Sodium 136 135 - 145 mmol/L    Potassium 4.4 3.6 - 5.5 mmol/L    Chloride 111 96 - 112 mmol/L    Co2 15 (L) 20 - 33 mmol/L    Anion Gap 10.0 7.0 - 16.0    Glucose 78 65 - 99 mg/dL    Bun 10 8 - 22 mg/dL    Creatinine 1.30 0.50 - 1.40 mg/dL    Calcium 7.8 (L) 8.5 - 10.5 mg/dL    Correct Calcium 9.0 8.5 - 10.5 mg/dL    AST(SGOT) 15 12 - 45 U/L    ALT(SGPT) 11 2 - 50 U/L    Alkaline Phosphatase 109 (H) 30 - 99 U/L    Total Bilirubin 1.2 0.1 - 1.5 mg/dL    Albumin 2.5 (L) 3.2 - 4.9 g/dL    Total Protein 6.3 6.0 - 8.2 g/dL    Globulin 3.8 (H) 1.9 - 3.5 g/dL    A-G Ratio 0.7 g/dL   TROPONIN   Result Value Ref Range    Troponin T 30 (H) 6 - 19 ng/L   MAGNESIUM   Result Value Ref Range    Magnesium 1.4 (L) 1.5 - 2.5 mg/dL   PHOSPHORUS   Result Value Ref Range    Phosphorus 3.1 2.5 - 4.5 mg/dL   TSH WITH REFLEX TO FT4   Result Value Ref Range    TSH 3.180 0.380 - 5.330 uIU/mL   AMMONIA   Result Value Ref Range    Ammonia 97 (H) 11 - 45 umol/L   LACTIC ACID   Result Value Ref Range    Lactic Acid 2.8 (H) 0.5 - 2.0 mmol/L   ESTIMATED GFR   Result Value Ref Range     GFR (CKD-EPI) 46 (A) >60 mL/min/1.73 m 2   IONIZED CALCIUM   Result Value Ref Range    Ionized Calcium 1.0 (L) 1.1 - 1.3 mmol/L   EKG   Result Value Ref Range    Report       [facility]    Test Date:  2024  Pt Name:    FRANCISCA TURNER          Department: ER  MRN:        [id number]                      Room:       10  Gender:     Female                       Technician: [id number]  :        1960                   Requested By:ER TRIAGE PROTOCOL  Order #:    [id number]                    Reading MD: Gregorio Kay    Measurements  Intervals                                Axis  Rate:       33                           P:          0  FL:         0                            QRS:        26  QRSD:       101                          T:          -3  QT:         571  QTc:        423    Interpretive Statements  Atrial fibrillation  bradycardia rate of 33  Low voltage, extremity and precordial leads  Compared to ECG 2024 01:35:31  T-wave abnormality no longer present  Possible ischemia no longer present  Electronically Signed On 2024 02:09:51 PDT by Gregorio Kay         All labs reviewed by me.    EKG:   I have independently interpreted this EKG   Radiology:   The attending Emergency Physician has independently interpreted the diagnostic imaging associated with this visit and is awaiting the final reading from the radiologist, which will be displayed below.    Preliminary interpretation is a follows: Chest x-ray no acute process  Radiologist interpretation:    DX-CHEST-PORTABLE (1 VIEW)   Final Result      Hypoinflation with crowding of the bronchovascular markings.           COURSE & MEDICAL DECISION MAKING    INITIAL ASSESSMENT AND PLAN  Care Narrative:         8:50 PM - Patient seen and evaluated at bedside.  64-year-old female presenting from skilled nursing facility for symptomatic bradycardia with orthostatic symptoms.  The patient is appears bradycardic here in  the 30s with soft blood pressures with systolics in the 90s but otherwise appears well and remainder of physical exam is benign.  Will obtain additional workup to rule out ischemic process, electrolyte derangement, metabolic derangement, endocrine disruption    12:23 workup notable for hypomagnesemia and mild lactic elevation likely in the setting of chronic liver failure and mild dehydration.  After fluid resuscitation and magnesium the patient's heart rate has improved into the 50s however she remains bradycardic and somewhat orthostatic.  I feel she would be best suited with inpatient admission at this time.  I discussed the patient's case and the above findings with Dr. Wyatt (Hospitalist) who agrees to evaluate the patient for hospitalization.    12:38 AM - I reevaluated the patient at bedside.  I informed the patient of my plan to admit today given the patient's current presentation and diagnostic study results. Patient verbalizes understanding and support with my plan for admission.        ADDITIONAL PROBLEM LIST AND DISPOSITION  Past Medical History:   Diagnosis Date    Abnormal finding of lung 12/27/2022    Acute exacerbation of chronic obstructive pulmonary disease (COPD) (HCC) 01/27/2020    Alcoholic cirrhosis (HCC)     Arthritis     OA in knees    ASTHMA     Atrial fibrillation (HCC)     Dry eyes 11/16/2017    Edentulous     upper and lower dentures    Emphysema of lung (HCC)     H/O: hysterectomy 12/05/2019    Heart burn     left knee    Hepatic encephalopathy (HCC) 12/27/2022    Hepatitis C infection 07/28/2022    History of rheumatic fever 09/04/2017    Hypertension     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HCC)     Recurrent, L knee, x4 with surgical washout    Medication overuse headache 08/15/2017    Mitral regurgitation     Pancytopenia (HCC) 07/26/2022    Pneumonia 02/2020    Primary osteoarthritis of left knee 08/25/2020    Prosthetic joint infection (HCC) 2018    Right knee after total knee     Protein-calorie malnutrition, severe (HCC) 08/01/2022    Right leg DVT ([facility]) 2018    acute, resolved    Seizure disorder ([facility])     Septic arthritis of knee, left (HCC) 07/19/2022    Septic arthritis of shoulder, left (HCC) 07/17/2022    Septic shock due to Pseudomonas species ([facility]) 10/30/2023    Type 2 diabetes mellitus ([facility]) 12/27/2022                  DISPOSITION AND DISCUSSIONS  I have discussed management of the patient with the following physicians and CANDIDO's: Dr. Wyatt (hospitalist)    Discussion of management with other [facility] or appropriate source(s): None     Escalation of care considered, and ultimately not performed: .    Barriers to care at this time, including but not limited to:  None noted .     Decision tools and prescription drugs considered including, but not limited to: .    DISPOSITION:  Patient will be hospitalized by Dr. Wyatt in guarded condition.     FINAL IMPRESSION   1. Symptomatic bradycardia    2. Dehydration    3. Hypomagnesemia         Jayshree ARGUETA (Scribe), am scribing for, and in the presence of, Gregorio Kay M.D..    Electronically signed by: Jayshree Pepe (Scribe), 9/21/2024    IGregorio M.D. personally performed the services described in this documentation, as scribed by Jayshree Pepe in my presence, and it is both accurate and complete.    The note accurately reflects work and decisions made by me.  Gregorio Kay M.D.  9/22/2024  2:13 AM

## 2024-09-22 NOTE — PROGRESS NOTES
Pt arrived to unit via gurney. Assumed care of patient. Resting in bed, denied pain. A&O x2. Tele monitoring in place. HR 50-60 on monitor. Skin check complete. Educated on fall risk despite mentation, all fall precautions in place. Call light within reach, bed locked and in lowest position, denied other needs at this time.      0412: Unable to complete admission profile due to patient mental status.

## 2024-09-22 NOTE — WOUND TEAM
[facility]  Inpatient Services  Initial Wound and Skin Care Evaluation    Admission Date: 9/21/2024     Last order of IP CONSULT TO WOUND CARE was found on 9/22/2024 from Hospital Encounter on 9/21/2024     HPI, PMH, SH: Reviewed    Past Surgical History:   Procedure Laterality Date    KNEE AMPUTATION ABOVE Left 1/16/2024    Procedure: LEFT ABOVE KNEE AMPUTATION;  Surgeon: Obdulio Gray M.D.;  Location: [facility];  Service: Orthopedics    PB TOTAL KNEE ARTHROPLASTY Left 10/30/2023    Procedure: LEFT TOTAL KNEE ARTHROPLASTY EXPLANTATION, INSERTION OF ANTIBIOTIC SPACER, AND APPLICATION OF INCISIONAL WOUND VACUUM;  Surgeon: Wild Luz M.D.;  Location: [facility];  Service: Orthopedics    PB REVISE KNEE JOINT REPLACE,ALL PARTS Left 12/28/2022    Procedure: REVISION, TOTAL ARTHROPLASTY, KNEE, ALL COMPONENTS-LEFT;  Surgeon: Wild uLz M.D.;  Location: [facility];  Service: Orthopedics    IRRIGATION & DEBRIDEMENT GENERAL Left 12/28/2022    Procedure: IRRIGATION AND DEBRIDEMENT, WOUND;  Surgeon: Wild Luz M.D.;  Location: [facility];  Service: Orthopedics    PB REVISE KNEE JOINT REPLACE,ALL PARTS Left 7/19/2022    Procedure: REVISION, TOTAL ARTHROPLASTY, KNEE, ALL COMPONENTS - ANTIBIOTIC SPACER;  Surgeon: Wild Luz M.D.;  Location: [facility];  Service: Orthopedics    IRRIGATION & DEBRIDEMENT GENERAL Left 7/17/2022    Procedure: IRRIGATION AND DEBRIDEMENT, SHOULDER;  Surgeon: Obdulio Gray M.D.;  Location: [facility];  Service: Orthopedics    PB TOTAL KNEE ARTHROPLASTY Left 8/24/2020    Procedure: ARTHROPLASTY, KNEE, TOTAL;  Surgeon: Víctor Faustin M.D.;  Location: [facility];  Service: Orthopedics    KNEE ARTHROPLASTY TOTAL Right 2018    IRRIGATION & DEBRIDEMENT ORTHO Right 9/3/2017    Procedure: IRRIGATION & DEBRIDEMENT ORTHO, POLY EXCHANGE;  Surgeon: Jerome Russell M.D.;  Location: [facility];  Service: Orthopedics    COLONOSCOPY WITH CLIPPING  10/28/2015    Procedure: COLONOSCOPY WITH CLIPPING;  Surgeon: Ruben Colon M.D.;  Location: [facility];  Service:     COLONOSCOPY WITH SCLEROTHERAPY  10/28/2015    Procedure: COLONOSCOPY WITH SCLEROTHERAPY;  Surgeon: Ruben Colon M.D.;  Location: [facility];  Service:     COLONOSCOPY WITH TATTOOING  10/28/2015    Procedure: COLONOSCOPY WITH TATTOOING;  Surgeon: Ruben Colon M.D.;  Location: [facility]OSCOPY [facility];  Service:     GYN SURGERY      hysteroscoopy    GYN SURGERY      tubal ligation     Social History     Tobacco Use    Smoking status: Former     Current packs/day: 0.00     Types: Cigarettes     Quit date: 2016     Years since quittin.7    Smokeless tobacco: Former     Quit date: 2021    Tobacco comments:     a few a day when I can   Substance Use Topics    Alcohol use: Not Currently     Comment: hx of AUD     Chief Complaint   Patient presents with    Irregular Heart Beat     bradycardia     Diagnosis: Symptomatic bradycardia [R00.1]    Unit where seen by Wound Team: T710/01     WOUND CONSULT RELATED TO:  sacrum and RLE    WOUND TEAM PLAN OF CARE - Frequency of Follow-up:   Nursing to follow dressing orders written for wound care. Contact wound team if area fails to progress, deteriorates or with any questions/concerns if something comes up before next scheduled follow up (See below as to whether wound is following and frequency of wound follow up)   Not following, consult as needed  - all areas    WOUND HISTORY:   Patient currently at [facility], she had a presyncopal episode tonight and found to have a heart rate down to the 30s.  Upon EMS arrival she was still in the 30s and they gave her 200s of atropine, improved to the 40s but she remained altered and confused.  On arrival in the ED here she was given a liter fluid bolus and 2 mg of IV magnesium, her mentation improved  further and her heart rate is now up to the 50s.     She was admitted to this facility last month for toxic metabolic encephalopathy secondary to opiate overdose and hepatic encephalopathy, was discharged to SNF.       WOUND ASSESSMENT/LDA  Moisture Associated Skin Damage 06/06/24 Sacrum;Perineum;Groin (Active)   First Observed Date/First Observed Time: 06/06/24 1600   Present on Original Admission: Yes  Laterality: Bilateral  Wound Location : Sacrum;Perineum;Groin      Assessments 9/22/2024 11:30 AM   Wound Image      NEXT Weekly Photo (Inpatient Only) 09/29/24   Drainage Amount None   Drainage Description Serosanguineous   Periwound Assessment Griffin   IAD Cleansing Approved Wound Cleanser   IAD Containment Device Purewick   WOUND NURSE ONLY - Time Spent with Patient (mins) 45      Area Assessed:  Bilateral Ears  Room air    Area Assessed:  Bilateral I.T.s  Area intact         Area Assessed:  Bilateral heels  Area intact       L BKA      Right Heel      RLE scar tissue from previous graft site     Vascular:    JERO:   No results found.    Lab Values:    Lab Results   Component Value Date/Time    WBC 3.9 (L) 09/21/2024 09:46 PM    RBC 3.09 (L) 09/21/2024 09:46 PM    HEMOGLOBIN 8.1 (L) 09/21/2024 09:46 PM    HEMATOCRIT 26.3 (L) 09/21/2024 09:46 PM    CREACTPROT 8.19 (H) 01/12/2024 05:58 AM    SEDRATEWES 23 02/12/2024 07:25 AM    HBA1C 5.3 05/16/2024 07:11 PM       Culture Results show:  No results found for this or any previous visit (from the past 720 hour(s)).    Pain Level/Medicated:  Patient denies pain       INTERVENTIONS BY WOUND TEAM:  Chart and images reviewed. Discussed with bedside RN. All areas of concern (based on picture review, LDA review and discussion with bedside RN) have been thoroughly assessed. Documentation of areas based on significant findings. This RN in to assess patient. Performed standard wound care which includes appropriate positioning, dressing removal and non-selective debridement.  Pictures and measurements obtained weekly if/when required.    Wound:  sacrum old MASD  Cleansed/Non-selectively Debrided with:  Moist warm washcloth  Primary Dressing:  heel offloading dressing (no sacrals available)    Advanced Wound Care Discharge Planning  Number of Clinicians necessary to complete wound care: 1  Is patient requiring IV pain medications for dressing changes:  No   Length of time for dressing change 30 min. (This does not include chart review, pre-medication time, set up, clean up or time spent charting.)    Interdisciplinary consultation: Patient, Bedside RN, Cyril BORJA (Wound RN).  Pressure injury and staging reviewed with Cyril BORJA (Wound RN).    EVALUATION / RATIONALE FOR TREATMENT:     Date:  09/22/24  Wound Status:  Initial evaluation    Area appears improved from previous admit with MASD.  Area continues to resolve. Protect with offloading dressing and continue turns.          Goals: Steady decrease in wound area and depth weekly.    NURSING PLAN OF CARE ORDERS:  RN Prevention Protocol    NUTRITION RECOMMENDATIONS   Wound Team Recommendations:  N/A    DIET ORDERS (From admission to next 24h)       Start     Ordered    09/22/24 0036  Diet Order Diet: Consistent CHO (Diabetic); Second Modifier: (optional): 2 Gram Sodium; Fluid modifications: (optional): 1500 ml Fluid Restriction  ALL MEALS        Question Answer Comment   Diet: Consistent CHO (Diabetic)    Second Modifier: (optional) 2 Gram Sodium    Fluid modifications: (optional) 1500 ml Fluid Restriction        09/22/24 0038                    PREVENTATIVE INTERVENTIONS:    Q shift Sincere - performed per nursing policy  Q shift pressure point assessments - performed per nursing policy    Surface/Positioning  Standard/trauma mattress - Currently in Place  Reposition q 2 hours - Currently in Place  TAPs Turning system - Currently in Place    Offloading/Redistribution  Sacral offloading dressing (Silicone dressing) - Applied this Visit  Heel  offloading dressing (Silicone dressing) - Applied this Visit  Float Heels off Bed with Pillows - Ordered           Respiratory  N/A    Containment/Moisture Prevention    Dri-bello pad - Currently in Place  Purwick/Condom Cath - Currently in Place    Anticipated discharge plans:  TBD        Vac Discharge Needs:  Vac Discharge plan is purely a recommendation from wound team and not a requirement for discharge unless otherwise stated by physician.  Not Applicable Pt not on a wound vac

## 2024-09-22 NOTE — RESPIRATORY CARE
" COPD EDUCATION by COPD CLINICAL EDUCATOR  9/22/2024 at 6:32 AM by Fiordaliza Russell, RRT     Patient reviewed by COPD education team. Patient does not have a history or diagnosis of COPD and is a non-smoker.  Therefore, patient does not qualify for the COPD program.      COPD Screen       COPD Assessment  COPD Clinical Specialists ONLY  COPD Education Initiated: No--Quick Screen (quit 2016 PFT 2/20/2020 [id number] Ratio79.9 no obstruction)  Interdisciplinary Rounds: Attendance at Rounds (30 Min)    PFT Results    No results found for: \"PFT\"    Meds to Beds                "

## 2024-09-22 NOTE — ASSESSMENT & PLAN NOTE
Presyncopal episode at SNF, heart rate was found to be in the 30s given atropine by EMS improved to the 40s.  Given fluids and magnesium on arrival here she was found to be hypomagnesemic with improvement in her heart rate and symptoms    DC beta-blocker and monitor

## 2024-09-22 NOTE — ED NOTES
Break RN: Pt to T710/01 via T7 RN. Pt with all belongings in possession. Pt is on cardiac monitoring and on room air. Report has been given.

## 2024-09-22 NOTE — ASSESSMENT & PLAN NOTE
Chronic anemia, hg drifting down likely dilutional  No clinical signs of bleeding  Monitor  hemoglobin I ordered repeat CBC

## 2024-09-22 NOTE — ED TRIAGE NOTES
Chief Complaint   Patient presents with    Irregular Heart Beat     bradycardia     Pt BIB EMS from [place] skilled for above. Pt was symptomatic with heart rate 35-40's. History of afib. Pt received 2mg atropine by EMS. HR remains 40's.

## 2024-09-22 NOTE — PROGRESS NOTES
4 Eyes Skin Assessment Completed by DANYEL Meza and TRUDY Valdez.    Head WDL  Ears Redness and Blanching  Nose Redness and Blanching  Mouth WDL  Neck WDL  Breast/Chest WDL  Shoulder Blades WDL  Spine WDL  (R) Arm/Elbow/Hand Bruising  (L) Arm/Elbow/Hand Bruising  Abdomen WDL  Groin Swelling Discoloration    Scrotum/Coccyx/Buttocks Redness Blanching Discoloration open skin    (R) Leg Scar, Bruising, and Swelling     (L) Leg Scar and Swelling LEFT BKA  (R) Heel/Foot/Toe Boggy and Swelling  (L) Heel/Foot/Toe N/A L BKA          Devices In Places Tele Box, Blood Pressure Cuff, and Pulse Ox      Interventions In Place Sacral Mepilex, TAP System, Pillows, Q2 Turns, Low Air Loss Mattress, and Barrier Cream    Possible Skin Injury Yes    Pictures Uploaded Into Epic Yes  Wound Consult Placed Yes  RN Wound Prevention Protocol Ordered Yes

## 2024-09-22 NOTE — PROGRESS NOTES
Med rec completed with Lisbeth from [facility] and MAR printout.     0415: Notified admitting MD of updated med rec. Pharmacist also notified.

## 2024-09-22 NOTE — PROGRESS NOTES
Patient seen and examined, now off metoprolol for her bradycardia., close monitor on tele . Also started on lactulose as her ammonia is elevated.  For more info please refer to H&P.

## 2024-09-23 LAB
ALBUMIN SERPL BCP-MCNC: 2.2 G/DL (ref 3.2–4.9)
ALBUMIN/GLOB SERPL: 0.6 G/DL
ALP SERPL-CCNC: 113 U/L (ref 30–99)
ALT SERPL-CCNC: 6 U/L (ref 2–50)
ANION GAP SERPL CALC-SCNC: 11 MMOL/L (ref 7–16)
AST SERPL-CCNC: 14 U/L (ref 12–45)
BILIRUB SERPL-MCNC: 1 MG/DL (ref 0.1–1.5)
BUN SERPL-MCNC: 14 MG/DL (ref 8–22)
CALCIUM ALBUM COR SERPL-MCNC: 9.1 MG/DL (ref 8.5–10.5)
CALCIUM SERPL-MCNC: 7.7 MG/DL (ref 8.5–10.5)
CHLORIDE SERPL-SCNC: 109 MMOL/L (ref 96–112)
CO2 SERPL-SCNC: 15 MMOL/L (ref 20–33)
CREAT SERPL-MCNC: 1.45 MG/DL (ref 0.5–1.4)
ERYTHROCYTE [DISTWIDTH] IN BLOOD BY AUTOMATED COUNT: 52.3 FL (ref 35.9–50)
GFR SERPLBLD CREATININE-BSD FMLA CKD-EPI: 40 ML/MIN/1.73 M 2
GLOBULIN SER CALC-MCNC: 3.8 G/DL (ref 1.9–3.5)
GLUCOSE BLD STRIP.AUTO-MCNC: 102 MG/DL (ref 65–99)
GLUCOSE BLD STRIP.AUTO-MCNC: 116 MG/DL (ref 65–99)
GLUCOSE BLD STRIP.AUTO-MCNC: 121 MG/DL (ref 65–99)
GLUCOSE BLD STRIP.AUTO-MCNC: 124 MG/DL (ref 65–99)
GLUCOSE SERPL-MCNC: 123 MG/DL (ref 65–99)
HCT VFR BLD AUTO: 26 % (ref 37–47)
HGB BLD-MCNC: 8 G/DL (ref 12–16)
MCH RBC QN AUTO: 25.2 PG (ref 27–33)
MCHC RBC AUTO-ENTMCNC: 30.8 G/DL (ref 32.2–35.5)
MCV RBC AUTO: 81.8 FL (ref 81.4–97.8)
PLATELET # BLD AUTO: 102 K/UL (ref 164–446)
PMV BLD AUTO: 9.5 FL (ref 9–12.9)
POTASSIUM SERPL-SCNC: 4 MMOL/L (ref 3.6–5.5)
PROT SERPL-MCNC: 6 G/DL (ref 6–8.2)
RBC # BLD AUTO: 3.18 M/UL (ref 4.2–5.4)
SODIUM SERPL-SCNC: 135 MMOL/L (ref 135–145)
WBC # BLD AUTO: 5 K/UL (ref 4.8–10.8)

## 2024-09-23 PROCEDURE — 85027 COMPLETE CBC AUTOMATED: CPT

## 2024-09-23 PROCEDURE — 97167 OT EVAL HIGH COMPLEX 60 MIN: CPT

## 2024-09-23 PROCEDURE — 770020 HCHG ROOM/CARE - TELE (206)

## 2024-09-23 PROCEDURE — 97535 SELF CARE MNGMENT TRAINING: CPT

## 2024-09-23 PROCEDURE — 700102 HCHG RX REV CODE 250 W/ 637 OVERRIDE(OP): Performed by: STUDENT IN AN ORGANIZED HEALTH CARE EDUCATION/TRAINING PROGRAM

## 2024-09-23 PROCEDURE — 700102 HCHG RX REV CODE 250 W/ 637 OVERRIDE(OP)

## 2024-09-23 PROCEDURE — 82962 GLUCOSE BLOOD TEST: CPT | Mod: 91

## 2024-09-23 PROCEDURE — 36415 COLL VENOUS BLD VENIPUNCTURE: CPT

## 2024-09-23 PROCEDURE — A9270 NON-COVERED ITEM OR SERVICE: HCPCS | Performed by: STUDENT IN AN ORGANIZED HEALTH CARE EDUCATION/TRAINING PROGRAM

## 2024-09-23 PROCEDURE — A9270 NON-COVERED ITEM OR SERVICE: HCPCS

## 2024-09-23 PROCEDURE — 99233 SBSQ HOSP IP/OBS HIGH 50: CPT | Performed by: INTERNAL MEDICINE

## 2024-09-23 PROCEDURE — A9270 NON-COVERED ITEM OR SERVICE: HCPCS | Performed by: INTERNAL MEDICINE

## 2024-09-23 PROCEDURE — 700105 HCHG RX REV CODE 258: Performed by: INTERNAL MEDICINE

## 2024-09-23 PROCEDURE — 700102 HCHG RX REV CODE 250 W/ 637 OVERRIDE(OP): Performed by: INTERNAL MEDICINE

## 2024-09-23 PROCEDURE — 80053 COMPREHEN METABOLIC PANEL: CPT

## 2024-09-23 RX ORDER — SODIUM CHLORIDE 9 MG/ML
INJECTION, SOLUTION INTRAVENOUS ONCE
Status: COMPLETED | OUTPATIENT
Start: 2024-09-23 | End: 2024-09-23

## 2024-09-23 RX ORDER — METOPROLOL TARTRATE 25 MG/1
12.5 TABLET, FILM COATED ORAL 2 TIMES DAILY
Status: DISCONTINUED | OUTPATIENT
Start: 2024-09-23 | End: 2024-09-24

## 2024-09-23 RX ADMIN — APIXABAN 5 MG: 5 TABLET, FILM COATED ORAL at 05:27

## 2024-09-23 RX ADMIN — METOPROLOL TARTRATE 12.5 MG: 25 TABLET, FILM COATED ORAL at 17:08

## 2024-09-23 RX ADMIN — INSULIN GLARGINE-YFGN 10 UNITS: 100 INJECTION, SOLUTION SUBCUTANEOUS at 17:14

## 2024-09-23 RX ADMIN — OMEPRAZOLE 20 MG: 20 CAPSULE, DELAYED RELEASE ORAL at 05:28

## 2024-09-23 RX ADMIN — ANTACID TABLETS 500 MG: 500 TABLET, CHEWABLE ORAL at 10:13

## 2024-09-23 RX ADMIN — ATORVASTATIN CALCIUM 20 MG: 20 TABLET, FILM COATED ORAL at 20:26

## 2024-09-23 RX ADMIN — LACTULOSE 30 ML: 10 SOLUTION ORAL at 05:27

## 2024-09-23 RX ADMIN — ACETAMINOPHEN 650 MG: 325 TABLET ORAL at 08:02

## 2024-09-23 RX ADMIN — RIFAXIMIN 550 MG: 550 TABLET ORAL at 17:08

## 2024-09-23 RX ADMIN — APIXABAN 5 MG: 5 TABLET, FILM COATED ORAL at 17:08

## 2024-09-23 RX ADMIN — LACTULOSE 30 ML: 10 SOLUTION ORAL at 12:00

## 2024-09-23 RX ADMIN — RIFAXIMIN 550 MG: 550 TABLET ORAL at 05:27

## 2024-09-23 RX ADMIN — SODIUM CHLORIDE: 9 INJECTION, SOLUTION INTRAVENOUS at 10:08

## 2024-09-23 RX ADMIN — LACTULOSE 30 ML: 10 SOLUTION ORAL at 17:08

## 2024-09-23 RX ADMIN — METOPROLOL TARTRATE 12.5 MG: 25 TABLET, FILM COATED ORAL at 10:11

## 2024-09-23 ASSESSMENT — PAIN DESCRIPTION - PAIN TYPE: TYPE: ACUTE PAIN

## 2024-09-23 ASSESSMENT — ACTIVITIES OF DAILY LIVING (ADL): TOILETING: REQUIRES ASSIST

## 2024-09-23 ASSESSMENT — ENCOUNTER SYMPTOMS
SHORTNESS OF BREATH: 0
ABDOMINAL PAIN: 0
COUGH: 0
VOMITING: 0
DIARRHEA: 0
FEVER: 0
NAUSEA: 0
CHILLS: 0

## 2024-09-23 ASSESSMENT — COGNITIVE AND FUNCTIONAL STATUS - GENERAL
PERSONAL GROOMING: A LITTLE
DRESSING REGULAR UPPER BODY CLOTHING: A LITTLE
DAILY ACTIVITIY SCORE: 17
SUGGESTED CMS G CODE MODIFIER DAILY ACTIVITY: CK
DRESSING REGULAR LOWER BODY CLOTHING: A LITTLE
HELP NEEDED FOR BATHING: A LOT
TOILETING: A LOT

## 2024-09-23 ASSESSMENT — FIBROSIS 4 INDEX: FIB4 SCORE: 3.59

## 2024-09-23 NOTE — PROGRESS NOTES
Bedside report received, assumed care of patient. Resting in bed, complained of pain, will medicate per MAR. A&O x4. Tele monitoring in place. Educated on fall risk, all fall precautions in place. Call light within reach, bed locked and in lowest position, denied other needs at this time.

## 2024-09-23 NOTE — DISCHARGE PLANNING
Care Transition Team Assessment     Patient, April Alicia, is 64-year-old admitted for irregular heart beat. Patient is alert and oriented x4; information was given by patient and pt's daughter, Ava Laureano. Please see pt's H&P for prior medical history. Patient reports living in a one story home with her daughter; [address] [place] [address], Ava reports pt recently became LTR at [facility]. Pt confirmed contacts on facesheet. Patient does have PCP, Anum Gatica. Patient has ACP documents; POLST and POA, daughter is listed as POA. Confirmed medical insurance is Medicaid.     Identified DC needs at this time:    None.    Information Source  Orientation Level: Oriented X4  Information Given By: Patient  Who is responsible for making decisions for patient? : Patient    Readmission Evaluation  Is this a readmission?: No    Elopement Risk  Legal Hold: No  Ambulatory or Self Mobile in Wheelchair: No-Not an Elopement Risk  Disoriented: No  Psychiatric Symptoms: None  History of Wandering: No  Elopement this Admit: No  Vocalizing Wanting to Leave: No  Displays Behaviors, Body Language Wanting to Leave: No-Not at Risk for Elopement  Elopement Risk: Not at Risk for Elopement    Interdisciplinary Discharge Planning  Primary Care Physician: Anum Gatica  Lives with - Patient's Self Care Capacity: Adult Children, Child Less than 18 Years of Age  Patient or legal guardian wants to designate a caregiver: No  Support Systems: Family Member(s)  Housing / Facility: 1 Story House    Discharge Preparedness  What is your plan after discharge?: Skilled nursing facility    Vision / Hearing Impairment  Vision Impairment : Yes  Right Eye Vision: Impaired, Wears Glasses  Left Eye Vision: Impaired, Wears Glasses  Hearing Impairment : Yes  Hearing Impairment: Both Ears, Hearing Device Not Available  Does Pt Need Special Equipment for the Hearing Impaired?: No    Advance Directive  Advance Directive?: POLST, DPOA for  Health Care    Domestic Abuse  Have you ever been the victim of abuse or violence?: No  Possible Abuse/Neglect Reported to:: Not Applicable    Anticipated Discharge Information  Discharge Disposition: D/T to SNF with Medicare cert in anticipation of skilled care (03)

## 2024-09-23 NOTE — PROGRESS NOTES
Hospital Medicine Daily Progress Note    Date of Service  9/23/2024    Chief Complaint  April Alicia is a 64 y.o. female admitted 9/21/2024 with syncope and bradycardia     Hospital Course  This is a  64 y.o. female who presented 9/21/2024 with presyncope, altered mental status.  PMH of A-fib on anticoagulation, alcoholic cirrhosis, hypertension, insulin-dependent diabetes, dyslipidemia, GERD.  Patient currently at [facility], she had a presyncopal episode   Upon EMS arrival she was still in the 30s and they gave her 200s of atropine, improved to the 40s but she remained altered and confused. On arrival in the ED here she was given a liter fluid bolus and 2 mg of IV magnesium   In the ED afebrile, heart rate in the 50s on my evaluation.  Labs showed creatinine of 1.46, magnesium 1.4, lactic acid 2.8, ammonia 97.     Interval Problem Update  Patient seen and examined, afebrile, her bradycardia has resolved so starting back on metoprolol 12.5 mg she was taking 75 mg BID at [facility].  Also gentle hydration for her ELIZABETH  but careful to not fluid overload   Close monitor on tele for decompensation   I have discussed this patient's plan of care and discharge plan at IDT rounds today with Case Management, Nursing, Nursing leadership, and other members of the IDT team.    Consultants/Specialty  None     Code Status  Full Code    Disposition  The patient is not medically cleared for discharge to home or a post-acute facility.      I have placed the appropriate orders for post-discharge needs.    Review of Systems  Review of Systems   Constitutional:  Negative for chills and fever.   Respiratory:  Negative for cough and shortness of breath.    Cardiovascular:  Negative for chest pain.   Gastrointestinal:  Negative for abdominal pain, diarrhea, nausea and vomiting.   Genitourinary:  Negative for dysuria and urgency.        Physical Exam  Temp:  [36.5 °C (97.7 °F)-36.7 °C (98.1 °F)] 36.7 °C (98.1 °F)  Pulse:  [69-75]  71  Resp:  [18] 18  BP: ()/(50-70) 103/70  SpO2:  [94 %-97 %] 97 %    Physical Exam  Constitutional:       Appearance: She is obese.   HENT:      Head: Normocephalic and atraumatic.      Mouth/Throat:      Mouth: Mucous membranes are moist.      Pharynx: No oropharyngeal exudate or posterior oropharyngeal erythema.   Cardiovascular:      Rate and Rhythm: Normal rate and regular rhythm.      Pulses: Normal pulses.      Heart sounds: Normal heart sounds. No murmur heard.  Pulmonary:      Effort: Pulmonary effort is normal. No respiratory distress.      Breath sounds: Normal breath sounds.   Musculoskeletal:         General: No tenderness. Normal range of motion.      Right lower leg: Edema present.      Left lower leg: Edema present.   Skin:     General: Skin is warm and dry.   Neurological:      General: No focal deficit present.      Mental Status: She is alert and oriented to person, place, and time.      Comments: Alert and oriented at this time but still with intermittent episodes of confusion   Psychiatric:         Mood and Affect: Mood normal.         Fluids    Intake/Output Summary (Last 24 hours) at 9/23/2024 1512  Last data filed at 9/23/2024 0847  Gross per 24 hour   Intake 1340 ml   Output --   Net 1340 ml        Laboratory  Recent Labs     09/21/24 2146 09/23/24  0138   WBC 3.9* 5.0   RBC 3.09* 3.18*   HEMOGLOBIN 8.1* 8.0*   HEMATOCRIT 26.3* 26.0*   MCV 85.1 81.8   MCH 26.2* 25.2*   MCHC 30.8* 30.8*   RDW 55.2* 52.3*   PLATELETCT 107* 102*   MPV 11.5 9.5     Recent Labs     09/21/24 2146 09/22/24  0440 09/23/24  0138   SODIUM 136 133* 135   POTASSIUM 4.4 4.5 4.0   CHLORIDE 111 106 109   CO2 15* 15* 15*   GLUCOSE 78 83 123*   BUN 10 11 14   CREATININE 1.30 1.21 1.45*   CALCIUM 7.8* 8.0* 7.7*                   Imaging  DX-CHEST-PORTABLE (1 VIEW)   Final Result      Hypoinflation with crowding of the bronchovascular markings.           Assessment/Plan  * Symptomatic bradycardia- (present on  admission)  Assessment & Plan  Presyncopal episode at SNF, heart rate was found to be in the 30s given atropine by EMS improved to the 40s.  Given fluids and magnesium on arrival here she was found to be hypomagnesemic with improvement in her heart rate and symptoms    Restarted back metoprolol today on low dose of 12.5     ELIZABETH (acute kidney injury) (HCC)- (present on admission)  Assessment & Plan  Gentle hydration and holding lasix close monitor renal function     Type 2 diabetes mellitus with diabetic neuropathy, with long-term current use of insulin (HCC)- (present on admission)  Assessment & Plan  Continue insulin.  Hold gabapentin due to ELIZABETH    Hypomagnesemia- (present on admission)  Assessment & Plan  1.4 on presentation.  IV repletion, levels ordered continue monitoring    Hepatic encephalopathy (HCC)- (present on admission)  Assessment & Plan  Her mentation did improve per EDP following improvement in her heart rate however she does remain confused  Ammonia is elevated.  She does not remember when the last time she had a bowel movement was  Stage II hepatic encephalopathy at this time  Will continue with lactulose and rifaximin    Normocytic anemia- (present on admission)  Assessment & Plan  Chronic anemia, baseline.  CBC ordered to continue monitoring    Dyslipidemia- (present on admission)  Assessment & Plan  Continue statin    Paroxysmal atrial fibrillation- (present on admission)  Assessment & Plan  Initially holding metoprolol due to her bradycardai which has now resolved, she was on 75 mg BID outpatient starting on the on low dose 12.5 today to see if she can tolerate it   Continue apixaban    Primary hypertension- (present on admission)  Assessment & Plan  Were holding ace and metoproll due to her bradycardia and hypotension on arrival  Now starting back low dose metoprolol      Thrombocytopenia (HCC)- (present on admission)  Assessment & Plan  Secondary to cirrhosis, baseline.  CBC ordered continue  monitoring.  No signs of bleeding    Alcoholic cirrhosis of liver (HCC)- (present on admission)  Assessment & Plan  She does have volume overload at this time.  Will hold furosemide due to ELIZABETH  Says she no longer drinks alcohol         VTE prophylaxis: eliquis     Greater than 51 minutes spent prepping to see patient (e.g. review of tests) obtaining and/or reviewing separately obtained history. Performing a medically appropriate examination and/ evaluation.  Counseling and educating the patient/family/caregiver.  Ordering medications, tests, or procedures.  Referring and communicating with other health care professionals.  Documenting clinical information in EPIC.  Independently interpreting results and communicating results to patient/family/caregiver.  Care coordination.      I have performed a physical exam and reviewed and updated ROS and Plan today (9/23/2024). In review of yesterday's note (9/22/2024), there are no changes except as documented above.

## 2024-09-23 NOTE — CARE PLAN
The patient is Stable - Low risk of patient condition declining or worsening    Shift Goals  Clinical Goals: Monitor vitals and labs, maintain/improve skin integrity  Patient Goals: Comfort  Family Goals: EDMUND    Progress made toward(s) clinical / shift goals:    Problem: Knowledge Deficit - Standard  Goal: Patient and family/care givers will demonstrate understanding of plan of care, disease process/condition, diagnostic tests and medications  Outcome: Progressing     Problem: Pain - Standard  Goal: Alleviation of pain or a reduction in pain to the patient’s comfort goal  Outcome: Progressing     Problem: Skin Integrity  Goal: Skin integrity is maintained or improved  Outcome: Progressing     Problem: Fall Risk  Goal: Patient will remain free from falls  Outcome: Progressing       Patient is not progressing towards the following goals:

## 2024-09-23 NOTE — THERAPY
Occupational Therapy   Initial Evaluation     Patient Name: April Alicia  Age:  64 y.o., Sex:  female  Medical Record #: [id number]  Today's Date: 9/23/2024     Precautions  Precautions: Fall Risk  Comments: Hard of hearing; Hx of L AKA    Assessment    Patient is 64 y.o. female with a PMHx significant for A-fib, alcoholic cirrhosis, HTN, insulin dependent DM, DLS, GERD, COPD and L AKA. Pt with recent admission to [facility] for toxic metabolic encephalopathy 2/2 opiate overdose and hepatic encephalopathy. Presented to the hospital from [facility] for bradycardia, presyncope and AMS. Found to have hypomagnesia, ELIZABETH and hepatic encephalopathy. Pt is a questionable historian. Per EMR, pt admitted from SNF; however, pt stating she was recently DC'd from SNF to home prior to being brought to [facility] for this current admission. Pt greeted and seen for OT eval. Required CGA - mod A for functional mobility/transfers and SBA - mod A for ADLs. Educated on home safety, transfers and pathology of bedrest. Currently limited by deficits in cognition/safety awareness, AROM, strength, balance, activity tolerance, functional mobility and pain which are currently affecting patients ability to complete ADL/IADLs at baseline. Will continue to follow.    Plan    Occupational Therapy Initial Treatment Plan   Treatment Interventions: Self Care / Activities of Daily Living, Adaptive Equipment, Neuro Re-Education / Balance, Therapeutic Exercises, Therapeutic Activity, Family / Caregiver Training  Treatment Frequency: 3 Times per Week  Duration: Until Therapy Goals Met    DC Equipment Recommendations: Unable to determine at this time  Discharge Recommendations: Recommend post-acute placement for additional occupational therapy services prior to discharge home     Objective     09/23/24 1155   Prior Living Situation   Prior Services Intermittent Physical Support for ADL Per Family   Housing / Facility 1 Story House   Steps Into Home 2  "  Steps In Home 0   Bathroom Set up Walk In Shower;Shower Chair;Grab Bars   Equipment Owned Wheelchair;Tub / Shower Seat;Grab Bar(s) In Tub / Shower;Grab Bar(s) By Toilet;Raised Toilet Seat Without Arms;Hand Held Shower;Hospital Bed   Lives with - Patient's Self Care Capacity Adult Children;Child Less than 18 Years of Age   Comments Pt is a questionable historian. Home setup information was relatively consistent from last admit. Pt reporting she was DC'd home from SNF and then returned to [facility] this admit; per EMR, pt was admitted from SNF. Pt reports she is home alone during the day d/t family being gone at work/school.   Prior Level of ADL Function   Self Feeding Independent   Grooming / Hygiene Independent   Bathing Requires Assist   Dressing Requires Assist   Toileting Requires Assist   Prior Level of IADL Function   Medication Management Requires Assist   Laundry Dependent   Kitchen Mobility Requires Assist   Finances Requires Assist   Home Management Dependent   Shopping Dependent   Prior Level Of Mobility Uses Wheel Chair for in Home Mobility   Driving / Transportation Relatives / Others Provide Transportation   History of Falls   History of Falls Yes   Date of Last Fall   (Reason for last admit. Reporting she fell \"5 times in the bathroom at home\". Unclear if information is accurate.)   Precautions   Precautions Fall Risk   Comments Hard of hearing; Hx of L AKA   Vitals   Pulse 60   Patient BP Position Douglas's Position   Blood Pressure 110/73   Room Air Oximetry 98   O2 Delivery Device None - Room Air   Vitals Comments No c/o dizziness or light headedness   Pain 0 - 10 Group   Location Abdomen   Therapist Pain Assessment During Activity;Post Activity Pain Same as Prior to Activity;Nurse Notified  (not quantified)   Cognition    Cognition / Consciousness X   Speech/ Communication Delayed Responses;Slurred   Level of Consciousness Alert   Safety Awareness Impaired   New Learning Impaired   Attention Impaired "   Comments Pleasant and participatory. Perseverative on falling in the bathroom prior to admit. Extremely hard of hearing. Loquacious and tangential; unclear if due to hard of hearing.   Passive ROM Upper Body   Passive ROM Upper Body WDL   Active ROM Upper Body   Active ROM Upper Body  X   Dominant Hand Right   Comments L shoulder limited by pain; reports baseline   Strength Upper Body   Upper Body Strength  X   Gross Strength Generalized Weakness, Equal Bilaterally.    Comments LUE limited by baseline pain   Coordination Upper Body   Coordination WDL   Balance Assessment   Sitting Balance (Static) Fair +   Sitting Balance (Dynamic) Fair   Standing Balance (Static) Fair -   Standing Balance (Dynamic) Poor +   Weight Shift Sitting Fair   Comments Stand pivot transfer with HHA and use of arms on BSC   Bed Mobility    Supine to Sit Contact Guard Assist   Sit to Supine Supervised   Scooting Supervised  (sitting EOB and supine up in bed)   Rolling Supervised   Comments HOB slightly elevated with use of rail   ADL Assessment   Eating Supervision   Grooming Supervision;Seated  (EOB; wiped face with washcloth)   Lower Body Dressing Standby Assist  (Sock long sitting in bed; extra time needed)   Toileting Moderate Assist  (pericare following BM on BSC)   Functional Mobility   Sit to Stand Minimal Assist   Bed, Chair, Wheelchair Transfer Moderate Assist   Toilet Transfers Moderate Assist  (BSC)   Transfer Method Stand Pivot   Mobility Bed > BSC > bed   Edema / Skin Assessment   Edema / Skin  Not Assessed   Activity Tolerance   Comments Limited by fatigue, weakness, cognition and pain   Patient / Family Goals   Patient / Family Goal #1 to go back to rehab   Short Term Goals   Short Term Goal # 1 Pt will complete toilet/ADL transfer with SPV   Short Term Goal # 2 Pt will complete toileting (pericare and clothing management) ADLs with SPV   Education Group   Education Provided Transfers;Role of Occupational Therapist;Pathology  of bedrest;Home Safety   Role of Occupational Therapist Patient Response Patient;Acceptance;Explanation;Verbal Demonstration;Reinforcement Needed   Home Safety Patient Response Patient;Acceptance;Explanation;Verbal Demonstration  (Educated on importance of only transfering when someone who can provide assist PRN is present.)   Transfers Patient Response Patient;Acceptance;Explanation;Verbal Demonstration;Reinforcement Needed  (Pt educated on safe hand placement for transfers and environemtn setup for safe transfers.)   Pathology of Bedrest Patient Response Patient;Acceptance;Explanation;Verbal Demonstration;Reinforcement Needed  (Educated on the benefits and importance of frequent EOB/OOB activity and up to chair for all meals.)   Occupational Therapy Initial Treatment Plan    Treatment Interventions Self Care / Activities of Daily Living;Adaptive Equipment;Neuro Re-Education / Balance;Therapeutic Exercises;Therapeutic Activity;Family / Caregiver Training   Treatment Frequency 3 Times per Week   Duration Until Therapy Goals Met   Problem List   Problem List Decreased Active Daily Living Skills;Decreased Homemaking Skills;Decreased Upper Extremity Strength Left;Decreased Upper Extremity Strength Right;Decreased Upper Extremity AROM Left;Decreased Functional Mobility;Decreased Activity Tolerance;Safety Awareness Deficits / Cognition;Impaired Cognitive Function;Impaired Postural Control / Balance   Anticipated Discharge Equipment and Recommendations   DC Equipment Recommendations Unable to determine at this time   Discharge Recommendations Recommend post-acute placement for additional occupational therapy services prior to discharge home   Interdisciplinary Plan of Care Collaboration   IDT Collaboration with  Nursing   Patient Position at End of Therapy In Bed;Bed Alarm On;Call Light within Reach;Tray Table within Reach;Phone within Reach   Collaboration Comments OT report and recs; RN updated   Session Information    Date / Session Number  9/23, #1 (1/3, 9/29)

## 2024-09-23 NOTE — CARE PLAN
The patient is Stable - Low risk of patient condition declining or worsening    Shift Goals  Clinical Goals: monitor heart rate and blood pressure  Patient Goals: comfort  Family Goals: Updates    Progress made toward(s) clinical / shift goals:    Problem: Knowledge Deficit - Standard  Goal: Patient and family/care givers will demonstrate understanding of plan of care, disease process/condition, diagnostic tests and medications  Outcome: Progressing     Problem: Pain - Standard  Goal: Alleviation of pain or a reduction in pain to the patient’s comfort goal  Outcome: Progressing     Problem: Skin Integrity  Goal: Skin integrity is maintained or improved  Outcome: Progressing. Q2 turn. Skin integrity bundle in place     Problem: Fall Risk  Goal: Patient will remain free from falls  Outcome: Progressing. Fall precautions in place      Problem: Safety  Goal: Will remain free from injury  Outcome: Progressing  Goal: Will remain free from falls  Outcome: Progressing       Patient is not progressing towards the following goals:

## 2024-09-23 NOTE — DISCHARGE PLANNING
1135  Agency/Facility Name: [facility]   Spoke To: Rory   Outcome: DPA messaged Rory via Teams to verify if pt is LTC at [place], per Rory pt is a LTC resident. SANTO Tesfaye notified.

## 2024-09-23 NOTE — PROGRESS NOTES
Monitor Summary  Rhythm: Afib  Rate: 67-85  Ectopy: (R) PVC's, coup  Measurements: -/.07/-  ---12 hr Chart Review---

## 2024-09-24 LAB
ALBUMIN SERPL BCP-MCNC: 2.2 G/DL (ref 3.2–4.9)
ALBUMIN/GLOB SERPL: 0.6 G/DL
ALP SERPL-CCNC: 107 U/L (ref 30–99)
ALT SERPL-CCNC: <5 U/L (ref 2–50)
ANION GAP SERPL CALC-SCNC: 11 MMOL/L (ref 7–16)
AST SERPL-CCNC: 17 U/L (ref 12–45)
BILIRUB SERPL-MCNC: 0.7 MG/DL (ref 0.1–1.5)
BUN SERPL-MCNC: 15 MG/DL (ref 8–22)
CALCIUM ALBUM COR SERPL-MCNC: 9.2 MG/DL (ref 8.5–10.5)
CALCIUM SERPL-MCNC: 7.8 MG/DL (ref 8.5–10.5)
CHLORIDE SERPL-SCNC: 110 MMOL/L (ref 96–112)
CO2 SERPL-SCNC: 13 MMOL/L (ref 20–33)
CREAT SERPL-MCNC: 1.58 MG/DL (ref 0.5–1.4)
ERYTHROCYTE [DISTWIDTH] IN BLOOD BY AUTOMATED COUNT: 54.6 FL (ref 35.9–50)
GFR SERPLBLD CREATININE-BSD FMLA CKD-EPI: 36 ML/MIN/1.73 M 2
GLOBULIN SER CALC-MCNC: 3.8 G/DL (ref 1.9–3.5)
GLUCOSE BLD STRIP.AUTO-MCNC: 112 MG/DL (ref 65–99)
GLUCOSE BLD STRIP.AUTO-MCNC: 119 MG/DL (ref 65–99)
GLUCOSE BLD STRIP.AUTO-MCNC: 123 MG/DL (ref 65–99)
GLUCOSE BLD STRIP.AUTO-MCNC: 73 MG/DL (ref 65–99)
GLUCOSE SERPL-MCNC: 107 MG/DL (ref 65–99)
HCT VFR BLD AUTO: 23.6 % (ref 37–47)
HGB BLD-MCNC: 7.3 G/DL (ref 12–16)
MCH RBC QN AUTO: 25.9 PG (ref 27–33)
MCHC RBC AUTO-ENTMCNC: 30.9 G/DL (ref 32.2–35.5)
MCV RBC AUTO: 83.7 FL (ref 81.4–97.8)
PLATELET # BLD AUTO: 82 K/UL (ref 164–446)
PLATELETS.RETICULATED NFR BLD AUTO: 1.2 % (ref 0.6–13.1)
PMV BLD AUTO: 9 FL (ref 9–12.9)
POTASSIUM SERPL-SCNC: 4.1 MMOL/L (ref 3.6–5.5)
PROT SERPL-MCNC: 6 G/DL (ref 6–8.2)
RBC # BLD AUTO: 2.82 M/UL (ref 4.2–5.4)
SODIUM SERPL-SCNC: 134 MMOL/L (ref 135–145)
WBC # BLD AUTO: 3.8 K/UL (ref 4.8–10.8)

## 2024-09-24 PROCEDURE — 80053 COMPREHEN METABOLIC PANEL: CPT

## 2024-09-24 PROCEDURE — 99233 SBSQ HOSP IP/OBS HIGH 50: CPT | Performed by: HOSPITALIST

## 2024-09-24 PROCEDURE — 85055 RETICULATED PLATELET ASSAY: CPT

## 2024-09-24 PROCEDURE — 82962 GLUCOSE BLOOD TEST: CPT

## 2024-09-24 PROCEDURE — 700102 HCHG RX REV CODE 250 W/ 637 OVERRIDE(OP): Performed by: STUDENT IN AN ORGANIZED HEALTH CARE EDUCATION/TRAINING PROGRAM

## 2024-09-24 PROCEDURE — 97535 SELF CARE MNGMENT TRAINING: CPT

## 2024-09-24 PROCEDURE — 51798 US URINE CAPACITY MEASURE: CPT

## 2024-09-24 PROCEDURE — 85027 COMPLETE CBC AUTOMATED: CPT

## 2024-09-24 PROCEDURE — A9270 NON-COVERED ITEM OR SERVICE: HCPCS | Performed by: STUDENT IN AN ORGANIZED HEALTH CARE EDUCATION/TRAINING PROGRAM

## 2024-09-24 PROCEDURE — 700105 HCHG RX REV CODE 258: Performed by: HOSPITALIST

## 2024-09-24 PROCEDURE — 97162 PT EVAL MOD COMPLEX 30 MIN: CPT

## 2024-09-24 PROCEDURE — A9270 NON-COVERED ITEM OR SERVICE: HCPCS | Performed by: INTERNAL MEDICINE

## 2024-09-24 PROCEDURE — 36415 COLL VENOUS BLD VENIPUNCTURE: CPT

## 2024-09-24 PROCEDURE — 770020 HCHG ROOM/CARE - TELE (206)

## 2024-09-24 PROCEDURE — A9270 NON-COVERED ITEM OR SERVICE: HCPCS | Performed by: HOSPITALIST

## 2024-09-24 PROCEDURE — 700102 HCHG RX REV CODE 250 W/ 637 OVERRIDE(OP)

## 2024-09-24 PROCEDURE — A9270 NON-COVERED ITEM OR SERVICE: HCPCS

## 2024-09-24 PROCEDURE — 700102 HCHG RX REV CODE 250 W/ 637 OVERRIDE(OP): Performed by: INTERNAL MEDICINE

## 2024-09-24 PROCEDURE — 700102 HCHG RX REV CODE 250 W/ 637 OVERRIDE(OP): Performed by: HOSPITALIST

## 2024-09-24 RX ORDER — GABAPENTIN 400 MG/1
400 CAPSULE ORAL 2 TIMES DAILY
Status: DISCONTINUED | OUTPATIENT
Start: 2024-09-24 | End: 2024-09-26 | Stop reason: HOSPADM

## 2024-09-24 RX ORDER — OXYCODONE HYDROCHLORIDE 5 MG/1
2.5 TABLET ORAL EVERY 6 HOURS PRN
Status: DISCONTINUED | OUTPATIENT
Start: 2024-09-24 | End: 2024-09-26 | Stop reason: HOSPADM

## 2024-09-24 RX ORDER — SODIUM CHLORIDE 9 MG/ML
INJECTION, SOLUTION INTRAVENOUS CONTINUOUS
Status: ACTIVE | OUTPATIENT
Start: 2024-09-24 | End: 2024-09-24

## 2024-09-24 RX ORDER — ALBUTEROL SULFATE 90 UG/1
2 INHALANT RESPIRATORY (INHALATION) EVERY 4 HOURS
Status: DISCONTINUED | OUTPATIENT
Start: 2024-09-24 | End: 2024-09-26 | Stop reason: HOSPADM

## 2024-09-24 RX ADMIN — APIXABAN 5 MG: 5 TABLET, FILM COATED ORAL at 05:31

## 2024-09-24 RX ADMIN — OMEPRAZOLE 20 MG: 20 CAPSULE, DELAYED RELEASE ORAL at 05:31

## 2024-09-24 RX ADMIN — ATORVASTATIN CALCIUM 20 MG: 20 TABLET, FILM COATED ORAL at 21:01

## 2024-09-24 RX ADMIN — ALBUTEROL SULFATE 2 PUFF: 90 AEROSOL, METERED RESPIRATORY (INHALATION) at 10:17

## 2024-09-24 RX ADMIN — ANTACID TABLETS 500 MG: 500 TABLET, CHEWABLE ORAL at 01:18

## 2024-09-24 RX ADMIN — GABAPENTIN 400 MG: 400 CAPSULE ORAL at 10:17

## 2024-09-24 RX ADMIN — ALBUTEROL SULFATE 2 PUFF: 90 AEROSOL, METERED RESPIRATORY (INHALATION) at 22:00

## 2024-09-24 RX ADMIN — SODIUM CHLORIDE: 9 INJECTION, SOLUTION INTRAVENOUS at 08:01

## 2024-09-24 RX ADMIN — ALBUTEROL SULFATE 2 PUFF: 90 AEROSOL, METERED RESPIRATORY (INHALATION) at 16:43

## 2024-09-24 RX ADMIN — RIFAXIMIN 550 MG: 550 TABLET ORAL at 05:31

## 2024-09-24 RX ADMIN — RIFAXIMIN 550 MG: 550 TABLET ORAL at 16:40

## 2024-09-24 RX ADMIN — LACTULOSE 30 ML: 10 SOLUTION ORAL at 16:40

## 2024-09-24 RX ADMIN — LACTULOSE 30 ML: 10 SOLUTION ORAL at 11:21

## 2024-09-24 RX ADMIN — GABAPENTIN 400 MG: 400 CAPSULE ORAL at 16:40

## 2024-09-24 RX ADMIN — METOPROLOL TARTRATE 12.5 MG: 25 TABLET, FILM COATED ORAL at 05:31

## 2024-09-24 RX ADMIN — INSULIN GLARGINE-YFGN 10 UNITS: 100 INJECTION, SOLUTION SUBCUTANEOUS at 16:50

## 2024-09-24 RX ADMIN — ACETAMINOPHEN 650 MG: 325 TABLET ORAL at 01:18

## 2024-09-24 RX ADMIN — APIXABAN 5 MG: 5 TABLET, FILM COATED ORAL at 16:40

## 2024-09-24 RX ADMIN — LACTULOSE 30 ML: 10 SOLUTION ORAL at 05:31

## 2024-09-24 RX ADMIN — SODIUM CHLORIDE: 9 INJECTION, SOLUTION INTRAVENOUS at 15:37

## 2024-09-24 RX ADMIN — ALBUTEROL SULFATE 2 PUFF: 90 AEROSOL, METERED RESPIRATORY (INHALATION) at 13:56

## 2024-09-24 ASSESSMENT — COGNITIVE AND FUNCTIONAL STATUS - GENERAL
MOBILITY SCORE: 12
MOVING FROM LYING ON BACK TO SITTING ON SIDE OF FLAT BED: A LITTLE
CLIMB 3 TO 5 STEPS WITH RAILING: TOTAL
WALKING IN HOSPITAL ROOM: TOTAL
MOVING TO AND FROM BED TO CHAIR: A LOT
STANDING UP FROM CHAIR USING ARMS: A LOT
TURNING FROM BACK TO SIDE WHILE IN FLAT BAD: A LITTLE
SUGGESTED CMS G CODE MODIFIER MOBILITY: CL

## 2024-09-24 ASSESSMENT — FIBROSIS 4 INDEX: FIB4 SCORE: 4.46

## 2024-09-24 ASSESSMENT — ENCOUNTER SYMPTOMS
SHORTNESS OF BREATH: 0
ABDOMINAL PAIN: 0
VOMITING: 0
NAUSEA: 0
FEVER: 0

## 2024-09-24 ASSESSMENT — PAIN DESCRIPTION - PAIN TYPE: TYPE: ACUTE PAIN

## 2024-09-24 NOTE — CARE PLAN
The patient is Stable - Low risk of patient condition declining or worsening    Shift Goals  Clinical Goals: monitor vitals, skin integrity  Patient Goals: blankets, sleep  Family Goals: EDMUND    Progress made toward(s) clinical / shift goals:    Problem: Knowledge Deficit - Standard  Goal: Patient and family/care givers will demonstrate understanding of plan of care, disease process/condition, diagnostic tests and medications  Outcome: Progressing     Problem: Pain - Standard  Goal: Alleviation of pain or a reduction in pain to the patient’s comfort goal  Outcome: Progressing     Problem: Skin Integrity  Goal: Skin integrity is maintained or improved  Outcome: Progressing     Problem: Fall Risk  Goal: Patient will remain free from falls  Outcome: Progressing       Patient is not progressing towards the following goals:

## 2024-09-24 NOTE — PROGRESS NOTES
Bedside report received, assumed care of patient at change of shift. Chart, labs, and orders reviewed. Pt resting in bed, breathing even and unlabored, complains of pain, declined intervention. A&O x4. Tele monitoring in place. Fall precautions including bed alarm in place and education provided. Call light within reach, bed locked and in lowest position, denies other needs at this time.

## 2024-09-24 NOTE — CARE PLAN
The patient is Stable - Low risk of patient condition declining or worsening    Shift Goals  Clinical Goals: Monitor vital and labs, maintain/improve skin integrity  Patient Goals: Comfort  Family Goals: EDMUND    Progress made toward(s) clinical / shift goals:    Problem: Knowledge Deficit - Standard  Goal: Patient and family/care givers will demonstrate understanding of plan of care, disease process/condition, diagnostic tests and medications  Outcome: Progressing     Problem: Pain - Standard  Goal: Alleviation of pain or a reduction in pain to the patient’s comfort goal  Outcome: Progressing     Problem: Skin Integrity  Goal: Skin integrity is maintained or improved  Outcome: Progressing     Problem: Fall Risk  Goal: Patient will remain free from falls  Outcome: Progressing       Patient is not progressing towards the following goals:

## 2024-09-24 NOTE — THERAPY
Physical Therapy   Initial Evaluation     Patient Name: April Alicia  Age:  64 y.o., Sex:  female  Medical Record #: [id number]  Today's Date: 9/24/2024          Assessment    64 y.o. female was admitted for AMS and presyncope with bradycardia.  PMHx includes a fib, EtOH, cirrhosis, HTN, DM, COPD, asthma, L AKA, and neuropathy.  Pt was living with her daughter in a 1SH with 1 MELVIN and was mod I for mobility with a w/c for her primary mobility.  On eval, she presents with decreased functional strength, and decreased activity tolerance impairing her mobility.  She will benefit from continued acute PT services to address the above deficits in order to return home safely.  Recommend post acute PT services.    Plan    Physical Therapy Initial Treatment Plan   Treatment Plan : (P) Bed Mobility, Equipment, Family / Caregiver Training, Manual Therapy, Neuro Re-Education / Balance, Self Care / Home Evaluation, Therapeutic Activities, Therapeutic Exercise  Treatment Frequency: (P) 3 Times per Week  Duration: (P) Until Therapy Goals Met    DC Equipment Recommendations: (P) None (Pt has a w/c and 4ww)  Discharge Recommendations: (P) Recommend post-acute placement for additional physical therapy services prior to discharge home            Objective       09/24/24 1314   Initial Contact Note    Initial Contact Note Order Received and Verified, Physical Therapy Evaluation in Progress with Full Report to Follow.   Vitals   O2 Delivery Device None - Room Air   Pain 0 - 10 Group   Therapist Pain Assessment Post Activity Pain Same as Prior to Activity  (no complaints of pain)   Prior Living Situation   Housing / Facility 1 Story House   Steps Into Home 1   Bathroom Set up Walk In Shower;Shower Chair;Grab Bars   Equipment Owned 4-Wheel Walker;Wheelchair;Adjustable Bed Without Rails   Lives with - Patient's Self Care Capacity Adult Children  (daughter works)   Prior Level of Functional Mobility   Bed Mobility Independent    Transfer Status Independent   Ambulation   (non ambulatory- doesn't have a prosthesis)   Wheelchair Independent   Stairs Dependent  (pt reports her daughter pulls her up the steps in her w/c)   History of Falls   History of Falls Yes   Date of Last Fall   (Pt reports 5 falls x1 year- LOB backward trying to back up to the toilet)   Cognition    Speech/ Communication Hard of Hearing   Level of Consciousness Alert   Active ROM Lower Body    Active ROM Lower Body  WDL   Strength Lower Body   Lower Body Strength  X   Comments RLE grossly 4/5, L AKA   Sensation Lower Body   Lower Extremity Sensation   WDL   Balance Assessment   Sitting Balance (Static) Fair +   Sitting Balance (Dynamic) Fair   Standing Balance (Static) Fair -   Standing Balance (Dynamic) Fair -   Weight Shift Sitting Fair   Weight Shift Standing Poor   Bed Mobility    Supine to Sit Standby Assist   Sit to Supine Supervised   Scooting Supervised   Rolling Supervised   Comments bed flat, rail   Gait Analysis   Comments non-ambulatory at baseline   Functional Mobility   Sit to Stand Moderate Assist   Bed, Chair, Wheelchair Transfer Maximal Assist   Transfer Method Stand Pivot  (only able to complete 1/4 pivot)   Mobility with FWW   Short Term Goals    Short Term Goal # 1 Pt will perform supine > sit SPV with bed flat, no rail in 6 visits in order to set up for upright mobility   Short Term Goal # 2 Pt will perform  sit < > stand SPV in 6 visits in order to complete a stnad pivot transfer to the w/c   Short Term Goal # 3 Pt will complete stand pivot with FWW SPV in 6 visits in order to access her w/c for mobility   Education Group   Education Provided Role of Physical Therapist;Transfer Status   Role of Physical Therapist Patient Response Patient;Acceptance;Explanation;Action Demonstration   Transfer Status Patient Response Patient;Acceptance;Explanation;Action Demonstration;Reinforcement Needed   Physical Therapy Initial Treatment Plan    Treatment Plan   Bed Mobility;Equipment;Family / Caregiver Training;Manual Therapy;Neuro Re-Education / Balance;Self Care / Home Evaluation;Therapeutic Activities;Therapeutic Exercise   Treatment Frequency 3 Times per Week   Duration Until Therapy Goals Met   Problem List    Problems Impaired Bed Mobility;Impaired Transfers;Impaired Ambulation;Functional Strength Deficit;Decreased Activity Tolerance   Anticipated Discharge Equipment and Recommendations   DC Equipment Recommendations None  (Pt has a w/c and 4ww)   Discharge Recommendations Recommend post-acute placement for additional physical therapy services prior to discharge home   Interdisciplinary Plan of Care Collaboration   IDT Collaboration with  Nursing   Patient Position at End of Therapy In Bed;Call Light within Reach;Tray Table within Reach;Bed Alarm On   Collaboration Comments RN updated   Session Information   Date / Session Number  9/24 -1 (1/3, 9/30)

## 2024-09-24 NOTE — PROGRESS NOTES
Hospital Medicine Daily Progress Note    Date of Service  9/24/2024    Chief Complaint  April Alicia is a 64 y.o. female admitted 9/21/2024 with syncope and bradycardia     Hospital Course  This is a  64 y.o. female who presented 9/21/2024 with presyncope, altered mental status.  PMH of A-fib on anticoagulation, alcoholic cirrhosis, hypertension, insulin-dependent diabetes, dyslipidemia, GERD.  Patient currently at [facility], she had a presyncopal episode   Upon EMS arrival she was still in the 30s and they gave her 200s of atropine, improved to the 40s but she remained altered and confused. On arrival in the ED here she was given a liter fluid bolus and 2 mg of IV magnesium   In the ED afebrile, heart rate in the 50s on my evaluation.  Labs showed creatinine of 1.46, magnesium 1.4, lactic acid 2.8, ammonia 97.     Interval Problem Update    Patient is afebrile on room air  Reviewed telemetry strips A-fib rate  50-60 with transient bradycardia down to 30s  I reviewed chemistry panel electrolytes stable BUN 15 creatinine 1.58  Reviewed CBC WBC 3.8 hemoglobin 7.3 platelets 82  Patient complains of neuropathic pain I reordered home dose of gabapentin  I reviewed previous medical records    Total time spent reviewing medical records lab results examining patient discussing with nursing staff case management and updating patient on plan of care 53 minutes       I have discussed this patient's plan of care and discharge plan at IDT rounds today with Case Management, Nursing, Nursing leadership, and other members of the IDT team.    Consultants/Specialty  None     Code Status  Full Code    Disposition  Medically Cleared  I have placed the appropriate orders for post-discharge needs.    Review of Systems  Review of Systems   Constitutional:  Negative for fever.   Respiratory:  Negative for shortness of breath.    Cardiovascular:  Negative for chest pain.   Gastrointestinal:  Negative for abdominal pain, nausea  and vomiting.        Physical Exam  Temp:  [36.3 °C (97.3 °F)-37.3 °C (99.1 °F)] 37.3 °C (99.1 °F)  Pulse:  [57-72] 61  Resp:  [12-18] 18  BP: (106-123)/(58-74) 108/74  SpO2:  [90 %-97 %] 97 %    Physical Exam  HENT:      Head: Normocephalic.   Eyes:      General:         Right eye: No discharge.         Left eye: No discharge.   Cardiovascular:      Rate and Rhythm: Bradycardia present. Rhythm irregular.   Pulmonary:      Effort: Pulmonary effort is normal.      Breath sounds: No rales.   Chest:      Chest wall: No tenderness.   Abdominal:      Palpations: Abdomen is soft.      Tenderness: There is no abdominal tenderness. There is no guarding.   Musculoskeletal:         General: Swelling present.      Cervical back: Neck supple.      Comments: Status post left AKA   Skin:     General: Skin is warm and dry.   Neurological:      General: No focal deficit present.      Mental Status: She is alert.   Psychiatric:         Mood and Affect: Mood normal.         Fluids    Intake/Output Summary (Last 24 hours) at 9/24/2024 1331  Last data filed at 9/24/2024 1000  Gross per 24 hour   Intake 1790 ml   Output 0 ml   Net 1790 ml        Laboratory  Recent Labs     09/21/24  2146 09/23/24  0138 09/24/24  0238   WBC 3.9* 5.0 3.8*   RBC 3.09* 3.18* 2.82*   HEMOGLOBIN 8.1* 8.0* 7.3*   HEMATOCRIT 26.3* 26.0* 23.6*   MCV 85.1 81.8 83.7   MCH 26.2* 25.2* 25.9*   MCHC 30.8* 30.8* 30.9*   RDW 55.2* 52.3* 54.6*   PLATELETCT 107* 102* 82*   MPV 11.5 9.5 9.0     Recent Labs     09/22/24  0440 09/23/24  0138 09/24/24  0238   SODIUM 133* 135 134*   POTASSIUM 4.5 4.0 4.1   CHLORIDE 106 109 110   CO2 15* 15* 13*   GLUCOSE 83 123* 107*   BUN 11 14 15   CREATININE 1.21 1.45* 1.58*   CALCIUM 8.0* 7.7* 7.8*                   Imaging  DX-CHEST-PORTABLE (1 VIEW)   Final Result      Hypoinflation with crowding of the bronchovascular markings.           Assessment/Plan  * Symptomatic bradycardia- (present on admission)  Assessment & Plan  Presyncopal  episode at SNF, heart rate was found to be in the 30s given atropine by EMS improved to the 40s.  Given fluids and magnesium on arrival here she was found to be hypomagnesemic with improvement in her heart rate and symptoms    DC beta-blocker and monitor  Consult cardiology if persistent off beta-blockers    ELIZABETH (acute kidney injury) (HCC)- (present on admission)  Assessment & Plan  Continue to hold diuretics  Gentle hydration I ordered NS  Monitor volume status    Type 2 diabetes mellitus with diabetic neuropathy, with long-term current use of insulin (HCC)- (present on admission)  Assessment & Plan  Continue Lantus and sliding scale insulin monitor CBGs    Hypomagnesemia- (present on admission)  Assessment & Plan  Repleted  Monitor levels I ordered repeat chemistry panel electrolytes    Hepatic encephalopathy (HCC)- (present on admission)  Assessment & Plan  Continue lactulose and rifaximin    Clinically improved    Normocytic anemia- (present on admission)  Assessment & Plan  Chronic anemia, monitor hemoglobin    Dyslipidemia- (present on admission)  Assessment & Plan  Continue statin    Paroxysmal atrial fibrillation- (present on admission)  Assessment & Plan  With bradycardia  D/c  beta-blocker  Continue telemetry monitoring  If bradycardia persist will consult cardiology  Continue Eliquis    Primary hypertension- (present on admission)  Assessment & Plan  Continue to hold BP meds given bradycardia and hypotension  Monitor blood pressure off medical therapy    Thrombocytopenia (HCC)- (present on admission)  Assessment & Plan  Secondary to cirrhosis, baseline.  CBC ordered continue monitoring.  No signs of bleeding    Alcoholic cirrhosis of liver (HCC)- (present on admission)  Assessment & Plan  Monitor volume status  Diuretics on hold given ELIZABETH         VTE prophylaxis: eliquis           I have performed a physical exam and reviewed and updated ROS and Plan today (9/24/2024). In review of yesterday's note  (9/23/2024), there are no changes except as documented above.

## 2024-09-25 LAB
ALBUMIN SERPL BCP-MCNC: 2.2 G/DL (ref 3.2–4.9)
ALBUMIN/GLOB SERPL: 0.6 G/DL
ALP SERPL-CCNC: 113 U/L (ref 30–99)
ALT SERPL-CCNC: 10 U/L (ref 2–50)
ANION GAP SERPL CALC-SCNC: 9 MMOL/L (ref 7–16)
ANISOCYTOSIS BLD QL SMEAR: NORMAL
AST SERPL-CCNC: 19 U/L (ref 12–45)
BILIRUB SERPL-MCNC: 0.6 MG/DL (ref 0.1–1.5)
BUN SERPL-MCNC: 14 MG/DL (ref 8–22)
BURR CELLS BLD QL SMEAR: NORMAL
CALCIUM ALBUM COR SERPL-MCNC: 9.3 MG/DL (ref 8.5–10.5)
CALCIUM SERPL-MCNC: 7.9 MG/DL (ref 8.5–10.5)
CHLORIDE SERPL-SCNC: 109 MMOL/L (ref 96–112)
CO2 SERPL-SCNC: 16 MMOL/L (ref 20–33)
CREAT SERPL-MCNC: 1.16 MG/DL (ref 0.5–1.4)
ERYTHROCYTE [DISTWIDTH] IN BLOOD BY AUTOMATED COUNT: 53.1 FL (ref 35.9–50)
GFR SERPLBLD CREATININE-BSD FMLA CKD-EPI: 52 ML/MIN/1.73 M 2
GLOBULIN SER CALC-MCNC: 3.7 G/DL (ref 1.9–3.5)
GLUCOSE BLD STRIP.AUTO-MCNC: 106 MG/DL (ref 65–99)
GLUCOSE BLD STRIP.AUTO-MCNC: 113 MG/DL (ref 65–99)
GLUCOSE BLD STRIP.AUTO-MCNC: 119 MG/DL (ref 65–99)
GLUCOSE SERPL-MCNC: 120 MG/DL (ref 65–99)
HCT VFR BLD AUTO: 22.8 % (ref 37–47)
HGB BLD-MCNC: 7 G/DL (ref 12–16)
MACROCYTES BLD QL SMEAR: NORMAL
MAGNESIUM SERPL-MCNC: 1.8 MG/DL (ref 1.5–2.5)
MCH RBC QN AUTO: 25.5 PG (ref 27–33)
MCHC RBC AUTO-ENTMCNC: 30.7 G/DL (ref 32.2–35.5)
MCV RBC AUTO: 82.9 FL (ref 81.4–97.8)
MORPHOLOGY BLD-IMP: NORMAL
OVALOCYTES BLD QL SMEAR: NORMAL
PLATELET # BLD AUTO: 62 K/UL (ref 164–446)
PLATELET BLD QL SMEAR: NORMAL
PLATELETS.RETICULATED NFR BLD AUTO: 1 % (ref 0.6–13.1)
PMV BLD AUTO: 9.3 FL (ref 9–12.9)
POIKILOCYTOSIS BLD QL SMEAR: NORMAL
POTASSIUM SERPL-SCNC: 4 MMOL/L (ref 3.6–5.5)
PROT SERPL-MCNC: 5.9 G/DL (ref 6–8.2)
RBC # BLD AUTO: 2.75 M/UL (ref 4.2–5.4)
RBC BLD AUTO: PRESENT
SCHISTOCYTES BLD QL SMEAR: NORMAL
SODIUM SERPL-SCNC: 134 MMOL/L (ref 135–145)
WBC # BLD AUTO: 2.9 K/UL (ref 4.8–10.8)

## 2024-09-25 PROCEDURE — 770020 HCHG ROOM/CARE - TELE (206)

## 2024-09-25 PROCEDURE — 97530 THERAPEUTIC ACTIVITIES: CPT

## 2024-09-25 PROCEDURE — 83735 ASSAY OF MAGNESIUM: CPT

## 2024-09-25 PROCEDURE — 85027 COMPLETE CBC AUTOMATED: CPT

## 2024-09-25 PROCEDURE — 700102 HCHG RX REV CODE 250 W/ 637 OVERRIDE(OP): Performed by: STUDENT IN AN ORGANIZED HEALTH CARE EDUCATION/TRAINING PROGRAM

## 2024-09-25 PROCEDURE — 700102 HCHG RX REV CODE 250 W/ 637 OVERRIDE(OP): Performed by: HOSPITALIST

## 2024-09-25 PROCEDURE — 80053 COMPREHEN METABOLIC PANEL: CPT

## 2024-09-25 PROCEDURE — 97535 SELF CARE MNGMENT TRAINING: CPT

## 2024-09-25 PROCEDURE — A9270 NON-COVERED ITEM OR SERVICE: HCPCS | Performed by: HOSPITALIST

## 2024-09-25 PROCEDURE — 85055 RETICULATED PLATELET ASSAY: CPT

## 2024-09-25 PROCEDURE — A9270 NON-COVERED ITEM OR SERVICE: HCPCS | Performed by: STUDENT IN AN ORGANIZED HEALTH CARE EDUCATION/TRAINING PROGRAM

## 2024-09-25 PROCEDURE — 99232 SBSQ HOSP IP/OBS MODERATE 35: CPT | Performed by: HOSPITALIST

## 2024-09-25 PROCEDURE — 82962 GLUCOSE BLOOD TEST: CPT | Mod: 91

## 2024-09-25 PROCEDURE — 36415 COLL VENOUS BLD VENIPUNCTURE: CPT

## 2024-09-25 RX ORDER — FUROSEMIDE 40 MG/1
40 TABLET ORAL
Status: DISCONTINUED | OUTPATIENT
Start: 2024-09-25 | End: 2024-09-26 | Stop reason: HOSPADM

## 2024-09-25 RX ADMIN — ALBUTEROL SULFATE 2 PUFF: 90 AEROSOL, METERED RESPIRATORY (INHALATION) at 05:40

## 2024-09-25 RX ADMIN — ALBUTEROL SULFATE 2 PUFF: 90 AEROSOL, METERED RESPIRATORY (INHALATION) at 14:00

## 2024-09-25 RX ADMIN — ATORVASTATIN CALCIUM 20 MG: 20 TABLET, FILM COATED ORAL at 23:29

## 2024-09-25 RX ADMIN — GABAPENTIN 400 MG: 400 CAPSULE ORAL at 05:39

## 2024-09-25 RX ADMIN — OXYCODONE 2.5 MG: 5 TABLET ORAL at 16:20

## 2024-09-25 RX ADMIN — OXYCODONE 2.5 MG: 5 TABLET ORAL at 07:54

## 2024-09-25 RX ADMIN — ALBUTEROL SULFATE 2 PUFF: 90 AEROSOL, METERED RESPIRATORY (INHALATION) at 18:00

## 2024-09-25 RX ADMIN — ALBUTEROL SULFATE 2 PUFF: 90 AEROSOL, METERED RESPIRATORY (INHALATION) at 10:00

## 2024-09-25 RX ADMIN — INSULIN GLARGINE-YFGN 10 UNITS: 100 INJECTION, SOLUTION SUBCUTANEOUS at 17:08

## 2024-09-25 RX ADMIN — LACTULOSE 30 ML: 10 SOLUTION ORAL at 05:39

## 2024-09-25 RX ADMIN — RIFAXIMIN 550 MG: 550 TABLET ORAL at 05:40

## 2024-09-25 RX ADMIN — ALBUTEROL SULFATE 2 PUFF: 90 AEROSOL, METERED RESPIRATORY (INHALATION) at 23:30

## 2024-09-25 RX ADMIN — INSULIN LISPRO 6 UNITS: 100 INJECTION, SOLUTION INTRAVENOUS; SUBCUTANEOUS at 11:00

## 2024-09-25 RX ADMIN — RIFAXIMIN 550 MG: 550 TABLET ORAL at 17:36

## 2024-09-25 RX ADMIN — OXYCODONE 2.5 MG: 5 TABLET ORAL at 23:37

## 2024-09-25 RX ADMIN — LACTULOSE 30 ML: 10 SOLUTION ORAL at 13:33

## 2024-09-25 RX ADMIN — INSULIN LISPRO 6 UNITS: 100 INJECTION, SOLUTION INTRAVENOUS; SUBCUTANEOUS at 05:51

## 2024-09-25 RX ADMIN — OMEPRAZOLE 20 MG: 20 CAPSULE, DELAYED RELEASE ORAL at 05:40

## 2024-09-25 RX ADMIN — GABAPENTIN 400 MG: 400 CAPSULE ORAL at 17:36

## 2024-09-25 RX ADMIN — APIXABAN 5 MG: 5 TABLET, FILM COATED ORAL at 05:39

## 2024-09-25 RX ADMIN — LACTULOSE 30 ML: 10 SOLUTION ORAL at 17:36

## 2024-09-25 RX ADMIN — FUROSEMIDE 40 MG: 40 TABLET ORAL at 07:54

## 2024-09-25 RX ADMIN — APIXABAN 5 MG: 5 TABLET, FILM COATED ORAL at 17:36

## 2024-09-25 ASSESSMENT — COGNITIVE AND FUNCTIONAL STATUS - GENERAL
DAILY ACTIVITIY SCORE: 18
DRESSING REGULAR LOWER BODY CLOTHING: A LITTLE
PERSONAL GROOMING: A LITTLE
TOILETING: A LITTLE
DRESSING REGULAR UPPER BODY CLOTHING: A LITTLE
SUGGESTED CMS G CODE MODIFIER DAILY ACTIVITY: CK
HELP NEEDED FOR BATHING: A LOT

## 2024-09-25 ASSESSMENT — ENCOUNTER SYMPTOMS
VOMITING: 0
NAUSEA: 0

## 2024-09-25 ASSESSMENT — PAIN DESCRIPTION - PAIN TYPE
TYPE: ACUTE PAIN

## 2024-09-25 ASSESSMENT — FIBROSIS 4 INDEX: FIB4 SCORE: 6.25

## 2024-09-25 NOTE — PROGRESS NOTES
Bedside report received from day shift RN, assumed care of patient. Chart, labs, and orders reviewed. Pt resting in bed, breathing even and unlabored, no complaints of pain. A&O x4. Tele monitoring in place. Fall precautions including bed alarm in place and education provided. Call light within reach, bed locked and in lowest position, denies other needs at this time

## 2024-09-25 NOTE — CARE PLAN
The patient is Stable - Low risk of patient condition declining or worsening    Shift Goals  Clinical Goals: Tele monitoring, maintain skin integrity  Patient Goals: sleep  Family Goals: EDMUND    Progress made toward(s) clinical / shift goals:    Problem: Knowledge Deficit - Standard  Goal: Patient and family/care givers will demonstrate understanding of plan of care, disease process/condition, diagnostic tests and medications  Outcome: Progressing     Problem: Pain - Standard  Goal: Alleviation of pain or a reduction in pain to the patient’s comfort goal  Outcome: Progressing     Problem: Skin Integrity  Goal: Skin integrity is maintained or improved  Outcome: Progressing     Problem: Fall Risk  Goal: Patient will remain free from falls  Outcome: Progressing       Patient is not progressing towards the following goals:

## 2024-09-25 NOTE — CARE PLAN
The patient is Stable - Low risk of patient condition declining or worsening    Shift Goals  Clinical Goals: Tele monitoring, maintain skin integrity  Patient Goals: sleep  Family Goals: EDMUND    Progress made toward(s) clinical / shift goals:        Problem: Knowledge Deficit - Standard  Goal: Patient and family/care givers will demonstrate understanding of plan of care, disease process/condition, diagnostic tests and medications  Outcome: Progressing     Problem: Skin Integrity  Goal: Skin integrity is maintained or improved  Outcome: Progressing     Problem: Fall Risk  Goal: Patient will remain free from falls  Outcome: Progressing       Patient is not progressing towards the following goals:

## 2024-09-25 NOTE — THERAPY
Occupational Therapy  Daily Treatment     Patient Name: April Alicia  Age:  64 y.o., Sex:  female  Medical Record #: [id number]  Today's Date: 9/25/2024     Precautions  Precautions: Fall Risk  Comments: Hard of hearing; Hx of L AKA    Assessment    Pt greeted and seen for OT treatment session. Progressing toward OT goals. Required mod A - max A for functional mobility and min A for toileting ADLs on BSC this session. Continues to be limited by deficits in cognition/safety awareness, AROM, strength, balance, activity tolerance, functional mobility and pain which are currently affecting patients ability to complete ADL/IADLs at baseline. Will continue to follow.     Plan    Treatment Plan Status: Continue Current Treatment Plan  Type of Treatment: Self Care / Activities of Daily Living, Adaptive Equipment, Neuro Re-Education / Balance, Therapeutic Exercises, Therapeutic Activity, Family / Caregiver Training  Treatment Frequency: 3 Times per Week  Treatment Duration: Until Therapy Goals Met    DC Equipment Recommendations: Unable to determine at this time  Discharge Recommendations: Recommend post-acute placement for additional occupational therapy services prior to discharge home    Objective     09/25/24 1552   Vitals   Pulse 79   Patient BP Position Sitting   Blood Pressure (!) 143/86   Pulse Oximetry 98 %   O2 Delivery Device None - Room Air   Vitals Comments No c/o dizziness or light headedness   Pain 0 - 10 Group   Therapist Pain Assessment During Activity;Post Activity Pain Same as Prior to Activity;0  (no c/op pain)   Balance   Sitting Balance (Static) Fair +   Sitting Balance (Dynamic) Fair   Standing Balance (Static) Fair -   Standing Balance (Dynamic) Fair -   Weight Shift Sitting Fair   Weight Shift Standing Poor   Skilled Intervention Compensatory Strategies;Verbal Cuing   Bed Mobility    Supine to Sit Standby Assist   Sit to Supine Supervised   Scooting Supervised   Rolling Supervised   Skilled  Intervention Compensatory Strategies;Verbal Cuing   Comments HOB flat with use of rail   Activities of Daily Living   Eating Supervision   Grooming Supervision;Seated  (wiped hands with washcloth)   Toileting Minimal Assist  (pericare)   Skilled Intervention Compensatory Strategies;Facilitation;Tactile Cuing;Verbal Cuing   Functional Mobility   Sit to Stand Moderate Assist   Bed, Chair, Wheelchair Transfer Maximal Assist  (toward LLE)   Toilet Transfers Moderate Assist  (BSC toward RLE)   Transfer Method Stand Pivot   Mobility HHA   Skilled Intervention Compensatory Strategies;Facilitation;Tactile Cuing;Verbal Cuing;Sequencing   Comments V/cs for hand placement during transfers   Activity Tolerance   Comments Limited by fatigue, weakness, cognition and pain   Patient / Family Goals   Patient / Family Goal #1 to go back to rehab   Goal #1 Outcome Progressing as expected   Short Term Goals   Short Term Goal # 1 Pt will complete toilet/ADL transfer with SPV   Goal Outcome # 1 Progressing as expected   Short Term Goal # 2 Pt will complete toileting (pericare and clothing management) ADLs with SPV   Goal Outcome # 2 Progressing as expected   Education Group   Education Provided Transfers;Role of Occupational Therapist;Pathology of bedrest   Role of Occupational Therapist Patient Response Patient;Acceptance;Explanation;Verbal Demonstration   Transfers Patient Response Patient;Acceptance;Explanation;Verbal Demonstration;Action Demonstration;Reinforcement Needed   Pathology of Bedrest Patient Response Patient;Acceptance;Explanation;Verbal Demonstration;Reinforcement Needed   Occupational Therapy Treatment Plan    O.T. Treatment Plan Continue Current Treatment Plan   Anticipated Discharge Equipment and Recommendations   DC Equipment Recommendations Unable to determine at this time   Discharge Recommendations Recommend post-acute placement for additional occupational therapy services prior to discharge home    Interdisciplinary Plan of Care Collaboration   IDT Collaboration with  Nursing   Patient Position at End of Therapy In Bed;Bed Alarm On;Call Light within Reach;Tray Table within Reach;Phone within Reach   Collaboration Comments RN updated   Session Information   Date / Session Number  9/25, #2 (2/3, 9/29)

## 2024-09-25 NOTE — PROGRESS NOTES
Hospital Medicine Daily Progress Note    Date of Service  9/25/2024    Chief Complaint  April Alicia is a 64 y.o. female admitted 9/21/2024 with syncope and bradycardia     Hospital Course  This is a  64 y.o. female who presented 9/21/2024 with presyncope, altered mental status.  PMH of A-fib on anticoagulation, alcoholic cirrhosis, hypertension, insulin-dependent diabetes, dyslipidemia, GERD.  Patient currently at [facility], she had a presyncopal episode   Upon EMS arrival she was still in the 30s and they gave her 200s of atropine, improved to the 40s but she remained altered and confused. On arrival in the ED here she was given a liter fluid bolus and 2 mg of IV magnesium   In the ED afebrile, heart rate in the 50s on my evaluation.  Labs showed creatinine of 1.46, magnesium 1.4, lactic acid 2.8, ammonia 97.     Interval Problem Update    Patient is afebrile on room air  I reviewed CBC WBC 2.9 hemoglobin 7.0 platelets 62  Reviewed chemistry panel BUN 14 creatinine 1.1 sodium 134  Patient with edema I ordered Lasix  I reviewed CBGs 106-123  I reviewed telemetry strips patient A-fib rate in the 60s     I have discussed this patient's plan of care and discharge plan at IDT rounds today with Case Management, Nursing, Nursing leadership, and other members of the IDT team.    Consultants/Specialty  None     Code Status  Full Code    Disposition  The patient is not medically cleared for discharge to home or a post-acute facility.      I have placed the appropriate orders for post-discharge needs.    Review of Systems  Review of Systems   Constitutional:  Positive for malaise/fatigue.   HENT:  Positive for hearing loss.    Cardiovascular:  Positive for leg swelling. Negative for chest pain.   Gastrointestinal:  Negative for nausea and vomiting.        Physical Exam  Temp:  [36.9 °C (98.4 °F)-37.4 °C (99.3 °F)] 37.1 °C (98.8 °F)  Pulse:  [66-84] 79  Resp:  [18] 18  BP: (122-125)/(60-81) 122/75  SpO2:  [93  %-97 %] 95 %    Physical Exam  HENT:      Head: Normocephalic and atraumatic.   Eyes:      General:         Right eye: No discharge.         Left eye: No discharge.   Cardiovascular:      Rate and Rhythm: Normal rate. Rhythm irregular.      Heart sounds: No murmur heard.  Pulmonary:      Breath sounds: No wheezing or rales.   Abdominal:      General: There is no distension.      Palpations: Abdomen is soft.      Tenderness: There is no abdominal tenderness.   Musculoskeletal:         General: Swelling present.      Comments: Status post left AKA   Neurological:      General: No focal deficit present.       Fluids    Intake/Output Summary (Last 24 hours) at 9/25/2024 1333  Last data filed at 9/25/2024 1112  Gross per 24 hour   Intake 1120 ml   Output 1800 ml   Net -680 ml        Laboratory  Recent Labs     09/23/24 0138 09/24/24 0238 09/25/24  0327   WBC 5.0 3.8* 2.9*   RBC 3.18* 2.82* 2.75*   HEMOGLOBIN 8.0* 7.3* 7.0*   HEMATOCRIT 26.0* 23.6* 22.8*   MCV 81.8 83.7 82.9   MCH 25.2* 25.9* 25.5*   MCHC 30.8* 30.9* 30.7*   RDW 52.3* 54.6* 53.1*   PLATELETCT 102* 82* 62*   MPV 9.5 9.0 9.3     Recent Labs     09/23/24 0138 09/24/24 0238 09/25/24  0327   SODIUM 135 134* 134*   POTASSIUM 4.0 4.1 4.0   CHLORIDE 109 110 109   CO2 15* 13* 16*   GLUCOSE 123* 107* 120*   BUN 14 15 14   CREATININE 1.45* 1.58* 1.16   CALCIUM 7.7* 7.8* 7.9*                   Imaging  DX-CHEST-PORTABLE (1 VIEW)   Final Result      Hypoinflation with crowding of the bronchovascular markings.           Assessment/Plan  * Symptomatic bradycardia- (present on admission)  Assessment & Plan  Presyncopal episode at [facility], heart rate was found to be in the 30s given atropine by EMS improved to the 40s.  Given fluids and magnesium on arrival here she was found to be hypomagnesemic with improvement in her heart rate and symptoms    DC beta-blocker and monitor      ELIZABETH (acute kidney injury) (HCC)- (present on admission)  Assessment & Plan  Resolved, resume  diuretics and monitor renal function electrolytes    Type 2 diabetes mellitus with diabetic neuropathy, with long-term current use of insulin (HCC)- (present on admission)  Assessment & Plan  Continue Lantus and sliding scale insulin monitor CBGs    Hypomagnesemia- (present on admission)  Assessment & Plan  Repleted monitor diuresis    Hepatic encephalopathy (HCC)- (present on admission)  Assessment & Plan  Continue lactulose and rifaximin    Clinically improved    Normocytic anemia- (present on admission)  Assessment & Plan  Chronic anemia, hg drifting down likely dilutional  No clinical signs of bleeding  Monitor  hemoglobin I ordered repeat CBC    Dyslipidemia- (present on admission)  Assessment & Plan  Continue statin    Paroxysmal atrial fibrillation- (present on admission)  Assessment & Plan  With bradycardia  D/c  beta-blocker  Continue telemetry monitoring  Continue Eliquis    Primary hypertension- (present on admission)  Assessment & Plan  Resume Lasix and monitor blood pressure    Thrombocytopenia (HCC)- (present on admission)  Assessment & Plan  Secondary to cirrhosis, baseline.  CBC ordered continue monitoring.  No signs of bleeding    Alcoholic cirrhosis of liver (HCC)- (present on admission)  Assessment & Plan  Restart Lasix with close monitoring of renal function electrolytes         VTE prophylaxis: eliquis           I have performed a physical exam and reviewed and updated ROS and Plan today (9/25/2024). In review of yesterday's note (9/24/2024), there are no changes except as documented above.

## 2024-09-25 NOTE — DISCHARGE PLANNING
Case Management Discharge Planning    Admission Date: 9/21/2024  GMLOS: 2.3  ALOS: 3    6-Clicks ADL Score: 17  6-Clicks Mobility Score: 12  PT and/or OT Eval ordered: Yes  Post-acute Referrals Ordered: Yes  Post-acute Choice Obtained: Yes  Has referral(s) been sent to post-acute provider:  Yes      Anticipated Discharge Dispo: Discharge Disposition: D/T to SNF with Medicare cert in anticipation of skilled care (03)    DME Needed: No    Action(s) Taken: Updated Provider/Nurse on Discharge Plan    Escalations Completed: None    Medically Clear: No    Next Steps: When pt is medically cleared will return to LTC at [place].    Barriers to Discharge: Medical clearance    Is the patient up for discharge tomorrow: No

## 2024-09-26 VITALS
OXYGEN SATURATION: 97 % | RESPIRATION RATE: 18 BRPM | BODY MASS INDEX: 36.68 KG/M2 | TEMPERATURE: 98.6 F | DIASTOLIC BLOOD PRESSURE: 82 MMHG | WEIGHT: 233.69 LBS | HEART RATE: 88 BPM | HEIGHT: 67 IN | SYSTOLIC BLOOD PRESSURE: 134 MMHG

## 2024-09-26 PROBLEM — N17.9 AKI (ACUTE KIDNEY INJURY) (HCC): Status: RESOLVED | Noted: 2024-06-03 | Resolved: 2024-09-26

## 2024-09-26 PROBLEM — R00.1 SYMPTOMATIC BRADYCARDIA: Status: RESOLVED | Noted: 2024-09-22 | Resolved: 2024-09-26

## 2024-09-26 LAB
ANION GAP SERPL CALC-SCNC: 6 MMOL/L (ref 7–16)
BUN SERPL-MCNC: 13 MG/DL (ref 8–22)
CALCIUM SERPL-MCNC: 7.8 MG/DL (ref 8.5–10.5)
CHLORIDE SERPL-SCNC: 111 MMOL/L (ref 96–112)
CO2 SERPL-SCNC: 18 MMOL/L (ref 20–33)
CREAT SERPL-MCNC: 0.85 MG/DL (ref 0.5–1.4)
ERYTHROCYTE [DISTWIDTH] IN BLOOD BY AUTOMATED COUNT: 52.3 FL (ref 35.9–50)
GFR SERPLBLD CREATININE-BSD FMLA CKD-EPI: 76 ML/MIN/1.73 M 2
GLUCOSE BLD STRIP.AUTO-MCNC: 109 MG/DL (ref 65–99)
GLUCOSE BLD STRIP.AUTO-MCNC: 92 MG/DL (ref 65–99)
GLUCOSE SERPL-MCNC: 102 MG/DL (ref 65–99)
HCT VFR BLD AUTO: 24.5 % (ref 37–47)
HGB BLD-MCNC: 7.6 G/DL (ref 12–16)
MCH RBC QN AUTO: 25.3 PG (ref 27–33)
MCHC RBC AUTO-ENTMCNC: 31 G/DL (ref 32.2–35.5)
MCV RBC AUTO: 81.7 FL (ref 81.4–97.8)
PLATELET # BLD AUTO: 64 K/UL (ref 164–446)
PLATELETS.RETICULATED NFR BLD AUTO: 1.1 % (ref 0.6–13.1)
PMV BLD AUTO: 10.3 FL (ref 9–12.9)
POTASSIUM SERPL-SCNC: 4.2 MMOL/L (ref 3.6–5.5)
RBC # BLD AUTO: 3 M/UL (ref 4.2–5.4)
SODIUM SERPL-SCNC: 135 MMOL/L (ref 135–145)
WBC # BLD AUTO: 2.8 K/UL (ref 4.8–10.8)

## 2024-09-26 PROCEDURE — A9270 NON-COVERED ITEM OR SERVICE: HCPCS | Performed by: STUDENT IN AN ORGANIZED HEALTH CARE EDUCATION/TRAINING PROGRAM

## 2024-09-26 PROCEDURE — 700102 HCHG RX REV CODE 250 W/ 637 OVERRIDE(OP): Performed by: HOSPITALIST

## 2024-09-26 PROCEDURE — 85027 COMPLETE CBC AUTOMATED: CPT

## 2024-09-26 PROCEDURE — 82962 GLUCOSE BLOOD TEST: CPT

## 2024-09-26 PROCEDURE — 700102 HCHG RX REV CODE 250 W/ 637 OVERRIDE(OP): Performed by: STUDENT IN AN ORGANIZED HEALTH CARE EDUCATION/TRAINING PROGRAM

## 2024-09-26 PROCEDURE — A9270 NON-COVERED ITEM OR SERVICE: HCPCS | Performed by: HOSPITALIST

## 2024-09-26 PROCEDURE — 85055 RETICULATED PLATELET ASSAY: CPT

## 2024-09-26 PROCEDURE — 36415 COLL VENOUS BLD VENIPUNCTURE: CPT

## 2024-09-26 PROCEDURE — 99239 HOSP IP/OBS DSCHRG MGMT >30: CPT | Performed by: HOSPITALIST

## 2024-09-26 PROCEDURE — 80048 BASIC METABOLIC PNL TOTAL CA: CPT

## 2024-09-26 RX ORDER — OXYCODONE HYDROCHLORIDE 5 MG/1
2.5 TABLET ORAL EVERY 6 HOURS PRN
Qty: 6 TABLET | Refills: 0 | Status: SHIPPED | OUTPATIENT
Start: 2024-09-26 | End: 2024-09-29

## 2024-09-26 RX ORDER — CLOTRIMAZOLE AND BETAMETHASONE DIPROPIONATE 10; .64 MG/G; MG/G
1 CREAM TOPICAL 2 TIMES DAILY
Status: SHIPPED
Start: 2024-09-26

## 2024-09-26 RX ORDER — FUROSEMIDE 40 MG/1
40 TABLET ORAL DAILY
Status: SHIPPED
Start: 2024-09-26

## 2024-09-26 RX ORDER — ACETAMINOPHEN 325 MG/1
650 TABLET ORAL EVERY 6 HOURS PRN
Status: ON HOLD
Start: 2024-09-26 | End: 2024-10-07

## 2024-09-26 RX ADMIN — FUROSEMIDE 40 MG: 40 TABLET ORAL at 05:39

## 2024-09-26 RX ADMIN — RIFAXIMIN 550 MG: 550 TABLET ORAL at 05:40

## 2024-09-26 RX ADMIN — OMEPRAZOLE 20 MG: 20 CAPSULE, DELAYED RELEASE ORAL at 05:40

## 2024-09-26 RX ADMIN — APIXABAN 5 MG: 5 TABLET, FILM COATED ORAL at 05:40

## 2024-09-26 RX ADMIN — ALBUTEROL SULFATE 2 PUFF: 90 AEROSOL, METERED RESPIRATORY (INHALATION) at 05:39

## 2024-09-26 RX ADMIN — LACTULOSE 30 ML: 10 SOLUTION ORAL at 05:39

## 2024-09-26 RX ADMIN — OXYCODONE 2.5 MG: 5 TABLET ORAL at 08:19

## 2024-09-26 RX ADMIN — GABAPENTIN 400 MG: 400 CAPSULE ORAL at 05:40

## 2024-09-26 RX ADMIN — ALBUTEROL SULFATE 2 PUFF: 90 AEROSOL, METERED RESPIRATORY (INHALATION) at 10:00

## 2024-09-26 ASSESSMENT — PAIN DESCRIPTION - PAIN TYPE
TYPE: ACUTE PAIN

## 2024-09-26 ASSESSMENT — FIBROSIS 4 INDEX: FIB4 SCORE: 6.2

## 2024-09-26 NOTE — DISCHARGE SUMMARY
Discharge Summary    CHIEF COMPLAINT ON ADMISSION  Chief Complaint   Patient presents with    Irregular Heart Beat     bradycardia       Reason for Admission  EMS    Admission Date  9/21/2024     CODE STATUS  Full Code    HPI & HOSPITAL COURSE  This is a  64 y.o. female who presented 9/21/2024 with presyncope, altered mental status.  PMH of A-fib on anticoagulation, alcoholic cirrhosis, hypertension, insulin-dependent diabetes, dyslipidemia, GERD.  Patient currently at [facility], she had a presyncopal episode   Upon EMS arrival she was still in the 30s and they gave her 200s of atropine, improved to the 40s but she remained altered and confused. On arrival in the ED here she was given a liter fluid bolus and 2 mg of IV magnesium   In the ED afebrile, heart rate in the 50s on my evaluation.  Labs showed creatinine of 1.46, magnesium 1.4, lactic acid 2.8, ammonia 97.     The patient was admitted and monitored on telemetry.  Her metoprolol was discontinued.  Her bradycardia resolved and her A-fib remains rate controlled off metoprolol.  She was restarted on lactulose and rifaximin.  Renal function improved with hydration IV fluids subsequently discontinued and she was restarted on Lasix.  Her blood pressure is stable off BP meds and does remain on hold.    The patient is otherwise clinically stable to transfer back to skilled nursing facility.      Therefore, she is discharged in good and stable condition to skilled nursing facility.    The patient met 2-midnight criteria for an inpatient stay at the time of discharge.      FOLLOW UP ITEMS POST DISCHARGE  Monitor CBC and chemistry panel and adjust diuretics and potassium supplements accordingly  Monitor blood pressure and resume BP meds accordingly  Avoid beta-blockers or AV bibi blockers      DISCHARGE DIAGNOSES  Principal Problem (Resolved):    Symptomatic bradycardia (POA: Yes)  Active Problems:    Alcoholic cirrhosis of liver (HCC) (POA: Yes)    Thrombocytopenia  (HCC) (POA: Yes)    Primary hypertension (POA: Yes)    Paroxysmal atrial fibrillation (POA: Yes)    Dyslipidemia (POA: Yes)    Normocytic anemia (POA: Yes)    Hepatic encephalopathy (HCC) (POA: Yes)    Hypomagnesemia (POA: Yes)    Type 2 diabetes mellitus with diabetic neuropathy, with long-term current use of insulin (HCC) (POA: Yes)  Resolved Problems:    ELIZABETH (acute kidney injury) (HCC) (POA: Yes)      FOLLOW UP  Future Appointments   Date Time Provider Department Center   10/1/2024  2:15 PM Sherif E. Wu, PT, DPT [facility]   10/15/2024  2:15 PM Sherif E. Wu, PT, DPT [facility]   10/18/2024  2:15 PM Sherif E. Wu, PT, DPT [facility]   10/22/2024  2:15 PM Sherif E. Wu, PT, DPT [facility]   10/25/2024  2:15 PM Sherif E. Wu, PT, DPT [facility]   10/29/2024  2:15 PM Sherif E. Wu, PT, DPT [facility]   11/1/2024  2:15 PM Sherif E. Wu, PT, DPT [facility]   11/5/2024  2:15 PM Sherif E. Wu, PT, DPT [facility]   12/4/2024  2:15 PM Sherif E. Wu, PT, DPT [facility]   12/11/2024  2:15 PM Sherif E. Wu, PT, DPT [facility]     [facility]  [address] [facility] [address]  [phone]          MEDICATIONS ON DISCHARGE     Medication List        START taking these medications        Instructions   lactulose 20 GM/30ML Soln   Take 30 mL by mouth 3 times a day. Hold dose if 4 or more soft or loose stools in the last 24 hours, please gradually increase amount per dose by 5mls if you do not have at least 2 stools per day.  Dose: 30 mL     oxyCODONE immediate-release 5 MG Tabs  Commonly known as: Roxicodone   Take 0.5 Tablets by mouth every 6 hours as needed for Severe Pain for up to 3 days.  Dose: 2.5 mg     riFAXIMin 550 MG Tabs tablet  Commonly known as: Xifaxan   Take 550 mg by mouth 2 times a day.  Dose: 550 mg            CHANGE how you take these medications        Instructions   acetaminophen 325 MG Tabs  What changed:   when to take  this  reasons to take this  Commonly known as: Tylenol   Take 2 Tablets by mouth every 6 hours as needed for Mild Pain or Moderate Pain.  Dose: 650 mg     furosemide 40 MG Tabs  What changed: when to take this  Commonly known as: Lasix   Take 1 Tablet by mouth every day.  Dose: 40 mg     insulin regular 100 Unit/mL Soln  What changed: additional instructions  Commonly known as: HumuLIN R/NovoLIN R   Inject 1-6 Units under the skin 4 Times a Day,Before Meals and at Bedtime. 151 - 200 mg/dL = 1 Units 201 - 250 mg/dL = 3 Units 251 - 300 mg/dL = 5 Units 301 - 350 mg/dL = 7 Units 351 - 400 mg/dL = 8 Units Over 400 mg/dL = 10 Units  Dose: 1-6 Units            CONTINUE taking these medications        Instructions   albuterol 108 (90 Base) MCG/ACT Aers inhalation aerosol   Inhale 2 Puffs every 4 hours. Every 4-6 hours prn  Dose: 2 Puff     atorvastatin 20 MG Tabs  Commonly known as: Lipitor   Take 1 Tablet by mouth every evening.  Dose: 20 mg     clotrimazole-betamethasone 1-0.05 % Crea  Commonly known as: Lotrisone   Apply 1 Application topically 2 times a day. Apply to right arm rash 2 times daily for 7 days  Dose: 1 Application     Eliquis 5 MG Tabs  Generic drug: apixaban   Take 1 Tablet by mouth 2 times a day. Indications: Thromboembolism secondary to Atrial Fibrillation  Dose: 5 mg     ferrous sulfate 325 (65 Fe) MG tablet   Take 325 mg by mouth 2 times a day.  Dose: 325 mg     gabapentin 400 MG Caps  Commonly known as: Neurontin   Take 400 mg by mouth 2 times a day.  Dose: 400 mg     insulin GLARGINE 100 UNIT/ML Soln  Commonly known as: Lantus,Semglee   Inject 10 Units under the skin every evening.  Dose: 10 Units     magnesium oxide 400 MG Tabs tablet  Commonly known as: Mag-Ox   Take 400 mg by mouth every day.  Dose: 400 mg     metFORMIN 500 MG Tabs  Commonly known as: Glucophage   Take 1,000 mg by mouth 2 times a day with meals.  Dose: 1,000 mg     pantoprazole 20 MG tablet  Commonly known as: Protonix   Take 20  mg by mouth every morning.  Dose: 20 mg     potassium chloride SA 20 MEQ Tbcr  Commonly known as: Kdur   Take 20 mEq by mouth every day.  Dose: 20 mEq     Vitamin C 1000 MG Tabs   Take 500 mg by mouth every day.  Dose: 500 mg            STOP taking these medications      felodipine ER 5 MG Tb24  Commonly known as: Plendil     losartan 100 MG Tabs  Commonly known as: Cozaar     Metoprolol Tartrate 75 MG Tabs     nystatin powder  Commonly known as: Mycostatin     ondansetron 4 MG Tbdp  Commonly known as: Zofran ODT              Allergies  Allergies   Allergen Reactions    Meropenem Swelling     Pt had rxt to meropenem June 2024 (marked tongue swelling and right facial/periorbital edema)    Tuberculin Tests Unspecified     Per MAR from [facility]       DIET  Orders Placed This Encounter   Procedures    Diet Order Diet: Consistent CHO (Diabetic); Second Modifier: (optional): 2 Gram Sodium     Standing Status:   Standing     Number of Occurrences:   1     Order Specific Question:   Diet:     Answer:   Consistent CHO (Diabetic) [4]     Order Specific Question:   Second Modifier: (optional)     Answer:   2 Gram Sodium [7]       ACTIVITY  As tolerated.  Weight bearing as tolerated    LINES, DRAINS, AND WOUNDS  This is an automated list. Peripheral IVs will be removed prior to discharge.  Peripheral IV 09/22/24 18 G Anterior;Left Wrist (Active)   Site Assessment Clean;Dry;Intact 09/26/24 0800   Dressing Type Transparent 09/26/24 0800   Line Status Flushed;Scrubbed the hub prior to access 09/26/24 0800   Dressing Status Clean;Dry;Intact 09/26/24 0800   Dressing Intervention N/A 09/26/24 0800   Dressing Change Due 09/28/24 09/22/24 2200   Infiltration Grading ([facility], [facility]) 0 09/26/24 0800   Phlebitis Scale ([facility] Only) 0 09/26/24 0800     External Urinary Catheter (Female Wick) (Active)   Collection Container Suction container 09/22/24 2159      Moisture Associated Skin Damage 06/06/24 Sacrum;Perineum;Groin (Active)   Wound  Image    09/22/24 1130   NEXT Weekly Photo (Inpatient Only) 09/29/24 09/22/24 1130   Drainage Amount None 09/25/24 1900   Drainage Description Serosanguineous 09/22/24 1130   Periwound Assessment Pink 09/24/24 2000   IAD Cleansing Approved Wound Cleanser 09/25/24 1900   Periwound Protectant Moisture Barrier 09/22/24 2159   IAD Containment Device Purewick 09/22/24 2159   WOUND NURSE ONLY - Time Spent with Patient (mins) 45 09/22/24 1130       Peripheral IV 09/22/24 18 G Anterior;Left Wrist (Active)   Site Assessment Clean;Dry;Intact 09/26/24 0800   Dressing Type Transparent 09/26/24 0800   Line Status Flushed;Scrubbed the hub prior to access 09/26/24 0800   Dressing Status Clean;Dry;Intact 09/26/24 0800   Dressing Intervention N/A 09/26/24 0800   Dressing Change Due 09/28/24 09/22/24 2200   Infiltration Grading ([facility], [facility]) 0 09/26/24 0800   Phlebitis Scale ([facility] Only) 0 09/26/24 0800                   LABORATORY  Lab Results   Component Value Date    SODIUM 135 09/26/2024    POTASSIUM 4.2 09/26/2024    CHLORIDE 111 09/26/2024    CO2 18 (L) 09/26/2024    GLUCOSE 102 (H) 09/26/2024    BUN 13 09/26/2024    CREATININE 0.85 09/26/2024    CREATININE 0.9 03/12/2009        Lab Results   Component Value Date    WBC 2.8 (L) 09/26/2024    HEMOGLOBIN 7.6 (L) 09/26/2024    HEMATOCRIT 24.5 (L) 09/26/2024    PLATELETCT 64 (L) 09/26/2024        Total time of the discharge process exceeds 35 minutes.

## 2024-09-26 NOTE — PROGRESS NOTES
Monitor Summary  Rhythm: Afib  Rate:   Ectopy: (R) PVC's, (R) coup  Measurements: -/.09/-  ---12 hr Chart Review---

## 2024-09-26 NOTE — PROGRESS NOTES
Report received from Rn. Assumed pt care. Pt resting in bed on RA. Pt A&O x 4. Fall precautions in place, call light and belongings within reach, bed in lowest position. No signs of distress.

## 2024-09-26 NOTE — DISCHARGE PLANNING
DC Transport Scheduled    Transport Company Scheduled:  [employer]  Spoke with Reanna at [facility] to schedule transport.  [facility] Trip #: [id number]    Scheduled Date: 9/26/2024  Scheduled Time: 1300    Transport Type: Wheelchair  Destination: [facility]   Destination address: [address] [place] [address]    Notified care team of scheduled transport via Voalte.     If there are any changes needed to the DC transportation scheduled, please contact [facility] at ext. [phone] between the hours of 8468-6686 Mon-Fri. If outside those hours, contact the ED Case Manager at ext. [phone].

## 2024-09-26 NOTE — PROGRESS NOTES
Handoff report received from day shift nurse and pt care assumed. Pt is currently resting in bed, A&Ox4 (confused and needs redirecting) with complaints of pain, on RA, and VSS. Tele box on and monitors notified. Call light and belongings within reach, bed in lowest and locked position, fall precautions in place. Pt educated on use of call light. Hourly rounding in place.

## 2024-09-26 NOTE — DISCHARGE PLANNING
0930  Agency/Facility Name: [facility]   Spoke To: Rory   Outcome: DPA messaged Rory via Teams to notify pt is medically cleared. Rory requesting transport to be arranged at 1200 CM RN Pat notified.     1029  DPA messaged Rory via Teams to confirm transport time at 1300.     1207  Agency/Facility Name: [facility]  Spoke To: Rory  Outcome: Rory messaged DPA to request of Insulin order can be clarified. CM RN Pat notified.

## 2024-09-26 NOTE — CARE PLAN
The patient is Stable - Low risk of patient condition declining or worsening    Shift Goals  Clinical Goals: safety, education  Patient Goals: rest  Family Goals: gisele    Problem: Knowledge Deficit - Standard  Goal: Patient and family/care givers will demonstrate understanding of plan of care, disease process/condition, diagnostic tests and medications  Outcome: Progressing   Pt and family educated on POC, all questions answered in regards to disease process, treatment and diet. Pt and family verbalize understanding and voice no further questions at this time.    Problem: Pain - Standard  Goal: Alleviation of pain or a reduction in pain to the patient’s comfort goal  Outcome: Progressing   Pt receiving prn pain medication as ordered per MD. Pt also using distraction to decrease pain.    Problem: Skin Integrity  Goal: Skin integrity is maintained or improved  Outcome: Progressing   Skin assessment complete, skin intact, pt encouraged to turn self and ambulate as tolerated.    Problem: Fall Risk  Goal: Patient will remain free from falls  Outcome: Progressing     Fall risk assessed, fall precautions in place, bed alarm on, pt verbalizes understanding of fall risk.

## 2024-09-26 NOTE — DISCHARGE PLANNING
Case Management Discharge Planning    Admission Date: 9/21/2024  GMLOS: 2.3  ALOS: 4    6-Clicks ADL Score: 18  6-Clicks Mobility Score: 12  PT and/or OT Eval ordered: Yes  Post-acute Referrals Ordered: Yes  Post-acute Choice Obtained: Yes  Has referral(s) been sent to post-acute provider:  Yes      Anticipated Discharge Dispo: Discharge Disposition: D/T to SNF with Medicare cert in anticipation of skilled care (03)    DME Needed: No    Action(s) Taken: Updated Provider/Nurse on Discharge Plan    Escalations Completed: None    Medically Clear: Yes    Next Steps: Pt has been cleared for transfer back to [place] for LTC. Spoke to daughter, Ava who gave phone consent for transfer. Planning for noon transport.                    Barriers to Discharge: None    Is the patient up for discharge tomorrow: No

## 2024-09-30 LAB — GLUCOSE BLD STRIP.AUTO-MCNC: 89 MG/DL (ref 65–99)

## 2024-10-01 ENCOUNTER — APPOINTMENT (OUTPATIENT)
Dept: PHYSICAL THERAPY | Facility: REHABILITATION | Age: 64
End: 2024-10-01
Attending: INTERNAL MEDICINE
Payer: MEDICAID

## 2024-10-02 ENCOUNTER — PATIENT MESSAGE (OUTPATIENT)
Dept: HEALTH INFORMATION MANAGEMENT | Facility: OTHER | Age: 64
End: 2024-10-02

## 2024-10-04 ENCOUNTER — APPOINTMENT (OUTPATIENT)
Dept: RADIOLOGY | Facility: MEDICAL CENTER | Age: 64
DRG: 441 | End: 2024-10-04
Attending: EMERGENCY MEDICINE
Payer: MEDICAID

## 2024-10-04 ENCOUNTER — APPOINTMENT (OUTPATIENT)
Dept: RADIOLOGY | Facility: MEDICAL CENTER | Age: 64
DRG: 441 | End: 2024-10-04
Payer: MEDICAID

## 2024-10-04 ENCOUNTER — HOSPITAL ENCOUNTER (INPATIENT)
Facility: MEDICAL CENTER | Age: 64
LOS: 3 days | DRG: 441 | End: 2024-10-07
Attending: EMERGENCY MEDICINE | Admitting: HOSPITALIST
Payer: MEDICAID

## 2024-10-04 ENCOUNTER — APPOINTMENT (OUTPATIENT)
Dept: RADIOLOGY | Facility: MEDICAL CENTER | Age: 64
DRG: 441 | End: 2024-10-04
Attending: HOSPITALIST
Payer: MEDICAID

## 2024-10-04 ENCOUNTER — APPOINTMENT (OUTPATIENT)
Dept: RADIOLOGY | Facility: MEDICAL CENTER | Age: 64
DRG: 441 | End: 2024-10-04
Attending: INTERNAL MEDICINE
Payer: MEDICAID

## 2024-10-04 DIAGNOSIS — Z79.891 LONG-TERM CURRENT USE OF OPIATE ANALGESIC: ICD-10-CM

## 2024-10-04 DIAGNOSIS — R26.2 DIFFICULTY IN WALKING, NOT ELSEWHERE CLASSIFIED: ICD-10-CM

## 2024-10-04 DIAGNOSIS — K76.82 HEPATIC ENCEPHALOPATHY (HCC): ICD-10-CM

## 2024-10-04 PROBLEM — N39.0 UTI (URINARY TRACT INFECTION): Status: ACTIVE | Noted: 2024-10-04

## 2024-10-04 LAB
ALBUMIN SERPL BCP-MCNC: 2.5 G/DL (ref 3.2–4.9)
ALBUMIN/GLOB SERPL: 0.5 G/DL
ALP SERPL-CCNC: 168 U/L (ref 30–99)
ALT SERPL-CCNC: 13 U/L (ref 2–50)
AMMONIA PLAS-SCNC: 145 UMOL/L (ref 11–45)
ANION GAP SERPL CALC-SCNC: 9 MMOL/L (ref 7–16)
APPEARANCE UR: ABNORMAL
AST SERPL-CCNC: 23 U/L (ref 12–45)
BACTERIA #/AREA URNS HPF: ABNORMAL /HPF
BASE EXCESS BLDV CALC-SCNC: -3 MMOL/L
BASOPHILS # BLD AUTO: 1 % (ref 0–1.8)
BASOPHILS # BLD: 0.09 K/UL (ref 0–0.12)
BILIRUB SERPL-MCNC: 1.2 MG/DL (ref 0.1–1.5)
BILIRUB UR QL STRIP.AUTO: NEGATIVE
BODY TEMPERATURE: 37.4 CENTIGRADE
BUN SERPL-MCNC: 25 MG/DL (ref 8–22)
CALCIUM ALBUM COR SERPL-MCNC: 9.2 MG/DL (ref 8.5–10.5)
CALCIUM SERPL-MCNC: 8 MG/DL (ref 8.5–10.5)
CHLORIDE SERPL-SCNC: 110 MMOL/L (ref 96–112)
CO2 SERPL-SCNC: 19 MMOL/L (ref 20–33)
COLOR UR: YELLOW
CREAT SERPL-MCNC: 0.88 MG/DL (ref 0.5–1.4)
EKG IMPRESSION: NORMAL
EOSINOPHIL # BLD AUTO: 0.11 K/UL (ref 0–0.51)
EOSINOPHIL NFR BLD: 1.2 % (ref 0–6.9)
EPI CELLS #/AREA URNS HPF: NEGATIVE /HPF
ERYTHROCYTE [DISTWIDTH] IN BLOOD BY AUTOMATED COUNT: 55.8 FL (ref 35.9–50)
GFR SERPLBLD CREATININE-BSD FMLA CKD-EPI: 73 ML/MIN/1.73 M 2
GLOBULIN SER CALC-MCNC: 4.7 G/DL (ref 1.9–3.5)
GLUCOSE BLD STRIP.AUTO-MCNC: 113 MG/DL (ref 65–99)
GLUCOSE BLD STRIP.AUTO-MCNC: 88 MG/DL (ref 65–99)
GLUCOSE BLD STRIP.AUTO-MCNC: 99 MG/DL (ref 65–99)
GLUCOSE SERPL-MCNC: 95 MG/DL (ref 65–99)
GLUCOSE UR STRIP.AUTO-MCNC: NEGATIVE MG/DL
HCO3 BLDV-SCNC: 20 MMOL/L (ref 24–28)
HCT VFR BLD AUTO: 27.3 % (ref 37–47)
HGB BLD-MCNC: 9.8 G/DL (ref 12–16)
HYALINE CASTS #/AREA URNS LPF: ABNORMAL /LPF
IMM GRANULOCYTES # BLD AUTO: 0.09 K/UL (ref 0–0.11)
IMM GRANULOCYTES NFR BLD AUTO: 1 % (ref 0–0.9)
INHALED O2 FLOW RATE: ABNORMAL L/MIN
KETONES UR STRIP.AUTO-MCNC: NEGATIVE MG/DL
LACTATE SERPL-SCNC: 1.4 MMOL/L (ref 0.5–2)
LACTATE SERPL-SCNC: 2 MMOL/L (ref 0.5–2)
LEUKOCYTE ESTERASE UR QL STRIP.AUTO: ABNORMAL
LYMPHOCYTES # BLD AUTO: 2.16 K/UL (ref 1–4.8)
LYMPHOCYTES NFR BLD: 22.9 % (ref 22–41)
MCH RBC QN AUTO: 27.8 PG (ref 27–33)
MCHC RBC AUTO-ENTMCNC: 35.9 G/DL (ref 32.2–35.5)
MCV RBC AUTO: 77.6 FL (ref 81.4–97.8)
MICRO URNS: ABNORMAL
MONOCYTES # BLD AUTO: 0.61 K/UL (ref 0–0.85)
MONOCYTES NFR BLD AUTO: 6.5 % (ref 0–13.4)
NEUTROPHILS # BLD AUTO: 6.36 K/UL (ref 1.82–7.42)
NEUTROPHILS NFR BLD: 67.4 % (ref 44–72)
NITRITE UR QL STRIP.AUTO: POSITIVE
NRBC # BLD AUTO: 0 K/UL
NRBC BLD-RTO: 0 /100 WBC (ref 0–0.2)
PCO2 BLDV: 28.5 MMHG (ref 41–51)
PCO2 TEMP ADJ BLDV: 29 MMHG (ref 41–51)
PH BLDV: 7.46 [PH] (ref 7.31–7.45)
PH TEMP ADJ BLDV: 7.45 [PH] (ref 7.31–7.45)
PH UR STRIP.AUTO: 7.5 [PH] (ref 5–8)
PLATELET # BLD AUTO: 164 K/UL (ref 164–446)
PMV BLD AUTO: 10.9 FL (ref 9–12.9)
PO2 BLDV: 48.7 MMHG (ref 25–40)
PO2 TEMP ADJ BLDV: 50.1 MMHG (ref 25–40)
POTASSIUM SERPL-SCNC: 5.2 MMOL/L (ref 3.6–5.5)
PROT SERPL-MCNC: 7.2 G/DL (ref 6–8.2)
PROT UR QL STRIP: NEGATIVE MG/DL
RBC # BLD AUTO: 3.52 M/UL (ref 4.2–5.4)
RBC # URNS HPF: ABNORMAL /HPF
RBC UR QL AUTO: ABNORMAL
SAO2 % BLDV: 80.9 %
SODIUM SERPL-SCNC: 138 MMOL/L (ref 135–145)
SP GR UR STRIP.AUTO: 1.01
UROBILINOGEN UR STRIP.AUTO-MCNC: 0.2 MG/DL
WBC # BLD AUTO: 9.4 K/UL (ref 4.8–10.8)
WBC #/AREA URNS HPF: ABNORMAL /HPF

## 2024-10-04 PROCEDURE — 96375 TX/PRO/DX INJ NEW DRUG ADDON: CPT

## 2024-10-04 PROCEDURE — 0DH67UZ INSERTION OF FEEDING DEVICE INTO STOMACH, VIA NATURAL OR ARTIFICIAL OPENING: ICD-10-PCS | Performed by: HOSPITALIST

## 2024-10-04 PROCEDURE — 74177 CT ABD & PELVIS W/CONTRAST: CPT

## 2024-10-04 PROCEDURE — 82962 GLUCOSE BLOOD TEST: CPT

## 2024-10-04 PROCEDURE — 99223 1ST HOSP IP/OBS HIGH 75: CPT | Performed by: HOSPITALIST

## 2024-10-04 PROCEDURE — 71045 X-RAY EXAM CHEST 1 VIEW: CPT | Performed by: RADIOLOGY

## 2024-10-04 PROCEDURE — 71045 X-RAY EXAM CHEST 1 VIEW: CPT

## 2024-10-04 PROCEDURE — 82140 ASSAY OF AMMONIA: CPT

## 2024-10-04 PROCEDURE — 700111 HCHG RX REV CODE 636 W/ 250 OVERRIDE (IP): Mod: JZ | Performed by: HOSPITALIST

## 2024-10-04 PROCEDURE — 85025 COMPLETE CBC W/AUTO DIFF WBC: CPT

## 2024-10-04 PROCEDURE — 87077 CULTURE AEROBIC IDENTIFY: CPT | Mod: 91

## 2024-10-04 PROCEDURE — 80053 COMPREHEN METABOLIC PANEL: CPT

## 2024-10-04 PROCEDURE — 70450 CT HEAD/BRAIN W/O DYE: CPT

## 2024-10-04 PROCEDURE — 700101 HCHG RX REV CODE 250: Performed by: HOSPITALIST

## 2024-10-04 PROCEDURE — 99285 EMERGENCY DEPT VISIT HI MDM: CPT

## 2024-10-04 PROCEDURE — 81001 URINALYSIS AUTO W/SCOPE: CPT

## 2024-10-04 PROCEDURE — 93005 ELECTROCARDIOGRAM TRACING: CPT

## 2024-10-04 PROCEDURE — 700117 HCHG RX CONTRAST REV CODE 255: Mod: UD | Performed by: EMERGENCY MEDICINE

## 2024-10-04 PROCEDURE — 36415 COLL VENOUS BLD VENIPUNCTURE: CPT

## 2024-10-04 PROCEDURE — 700102 HCHG RX REV CODE 250 W/ 637 OVERRIDE(OP): Performed by: HOSPITALIST

## 2024-10-04 PROCEDURE — 87086 URINE CULTURE/COLONY COUNT: CPT

## 2024-10-04 PROCEDURE — 700105 HCHG RX REV CODE 258: Performed by: HOSPITALIST

## 2024-10-04 PROCEDURE — 82803 BLOOD GASES ANY COMBINATION: CPT

## 2024-10-04 PROCEDURE — 93005 ELECTROCARDIOGRAM TRACING: CPT | Performed by: EMERGENCY MEDICINE

## 2024-10-04 PROCEDURE — 83605 ASSAY OF LACTIC ACID: CPT | Mod: 91

## 2024-10-04 PROCEDURE — 304538 HCHG NG TUBE

## 2024-10-04 PROCEDURE — 770020 HCHG ROOM/CARE - TELE (206)

## 2024-10-04 PROCEDURE — 87186 SC STD MICRODIL/AGAR DIL: CPT

## 2024-10-04 PROCEDURE — 87040 BLOOD CULTURE FOR BACTERIA: CPT | Mod: 91

## 2024-10-04 PROCEDURE — 96374 THER/PROPH/DIAG INJ IV PUSH: CPT

## 2024-10-04 PROCEDURE — A9270 NON-COVERED ITEM OR SERVICE: HCPCS | Performed by: HOSPITALIST

## 2024-10-04 PROCEDURE — 700111 HCHG RX REV CODE 636 W/ 250 OVERRIDE (IP): Mod: JZ,UD | Performed by: EMERGENCY MEDICINE

## 2024-10-04 RX ORDER — DEXTROSE MONOHYDRATE 25 G/50ML
25 INJECTION, SOLUTION INTRAVENOUS
Status: DISCONTINUED | OUTPATIENT
Start: 2024-10-04 | End: 2024-10-07 | Stop reason: HOSPADM

## 2024-10-04 RX ORDER — LIDOCAINE HYDROCHLORIDE 20 MG/ML
JELLY TOPICAL
Status: COMPLETED | OUTPATIENT
Start: 2024-10-04 | End: 2024-10-04

## 2024-10-04 RX ORDER — ATORVASTATIN CALCIUM 20 MG/1
20 TABLET, FILM COATED ORAL NIGHTLY
Status: DISCONTINUED | OUTPATIENT
Start: 2024-10-04 | End: 2024-10-04

## 2024-10-04 RX ORDER — LACTULOSE 10 G/15ML
30 SOLUTION ORAL 3 TIMES DAILY
Status: DISCONTINUED | OUTPATIENT
Start: 2024-10-04 | End: 2024-10-04

## 2024-10-04 RX ORDER — INSULIN LISPRO 100 [IU]/ML
2-9 INJECTION, SOLUTION INTRAVENOUS; SUBCUTANEOUS EVERY 6 HOURS
Status: DISCONTINUED | OUTPATIENT
Start: 2024-10-04 | End: 2024-10-07 | Stop reason: HOSPADM

## 2024-10-04 RX ORDER — AMOXICILLIN 250 MG
2 CAPSULE ORAL EVERY EVENING
Status: DISCONTINUED | OUTPATIENT
Start: 2024-10-04 | End: 2024-10-04

## 2024-10-04 RX ORDER — POLYETHYLENE GLYCOL 3350 17 G/17G
1 POWDER, FOR SOLUTION ORAL
Status: DISCONTINUED | OUTPATIENT
Start: 2024-10-04 | End: 2024-10-04

## 2024-10-04 RX ORDER — HALOPERIDOL 5 MG/ML
2.5 INJECTION INTRAMUSCULAR ONCE
Status: COMPLETED | OUTPATIENT
Start: 2024-10-04 | End: 2024-10-04

## 2024-10-04 RX ORDER — AMOXICILLIN 250 MG
2 CAPSULE ORAL EVERY EVENING
Status: DISCONTINUED | OUTPATIENT
Start: 2024-10-04 | End: 2024-10-05

## 2024-10-04 RX ORDER — METOPROLOL TARTRATE 25 MG/1
25 TABLET, FILM COATED ORAL EVERY 8 HOURS
Status: DISCONTINUED | OUTPATIENT
Start: 2024-10-04 | End: 2024-10-04

## 2024-10-04 RX ORDER — METOPROLOL TARTRATE 25 MG/1
25 TABLET, FILM COATED ORAL EVERY 8 HOURS
Status: DISCONTINUED | OUTPATIENT
Start: 2024-10-04 | End: 2024-10-05

## 2024-10-04 RX ORDER — ATORVASTATIN CALCIUM 20 MG/1
20 TABLET, FILM COATED ORAL NIGHTLY
Status: DISCONTINUED | OUTPATIENT
Start: 2024-10-04 | End: 2024-10-05

## 2024-10-04 RX ORDER — SODIUM CHLORIDE, SODIUM LACTATE, POTASSIUM CHLORIDE, CALCIUM CHLORIDE 600; 310; 30; 20 MG/100ML; MG/100ML; MG/100ML; MG/100ML
INJECTION, SOLUTION INTRAVENOUS CONTINUOUS
Status: DISCONTINUED | OUTPATIENT
Start: 2024-10-04 | End: 2024-10-06

## 2024-10-04 RX ORDER — LACTULOSE 10 G/15ML
30 SOLUTION ORAL 3 TIMES DAILY
Status: DISCONTINUED | OUTPATIENT
Start: 2024-10-04 | End: 2024-10-05

## 2024-10-04 RX ORDER — OXYCODONE HYDROCHLORIDE 5 MG/1
5 TABLET ORAL EVERY 6 HOURS PRN
Status: ON HOLD | COMMUNITY
End: 2024-10-07

## 2024-10-04 RX ORDER — DILTIAZEM HYDROCHLORIDE 5 MG/ML
0.25 INJECTION INTRAVENOUS ONCE
Status: COMPLETED | OUTPATIENT
Start: 2024-10-04 | End: 2024-10-04

## 2024-10-04 RX ORDER — POLYETHYLENE GLYCOL 3350 17 G/17G
1 POWDER, FOR SOLUTION ORAL
Status: DISCONTINUED | OUTPATIENT
Start: 2024-10-04 | End: 2024-10-05

## 2024-10-04 RX ADMIN — DILTIAZEM HYDROCHLORIDE 24.95 MG: 5 INJECTION, SOLUTION INTRAVENOUS at 10:11

## 2024-10-04 RX ADMIN — METOPROLOL TARTRATE 25 MG: 25 TABLET, FILM COATED ORAL at 18:11

## 2024-10-04 RX ADMIN — HALOPERIDOL LACTATE 2.5 MG: 5 INJECTION, SOLUTION INTRAMUSCULAR at 09:36

## 2024-10-04 RX ADMIN — APIXABAN 5 MG: 5 TABLET, FILM COATED ORAL at 18:04

## 2024-10-04 RX ADMIN — SENNOSIDES AND DOCUSATE SODIUM 2 TABLET: 50; 8.6 TABLET ORAL at 17:55

## 2024-10-04 RX ADMIN — IOHEXOL 100 ML: 350 INJECTION, SOLUTION INTRAVENOUS at 11:45

## 2024-10-04 RX ADMIN — ATORVASTATIN CALCIUM 20 MG: 20 TABLET, FILM COATED ORAL at 20:31

## 2024-10-04 RX ADMIN — SODIUM CHLORIDE, POTASSIUM CHLORIDE, SODIUM LACTATE AND CALCIUM CHLORIDE: 600; 310; 30; 20 INJECTION, SOLUTION INTRAVENOUS at 15:12

## 2024-10-04 RX ADMIN — HALOPERIDOL LACTATE 2.5 MG: 5 INJECTION, SOLUTION INTRAMUSCULAR at 09:52

## 2024-10-04 RX ADMIN — PIPERACILLIN AND TAZOBACTAM 3.38 G: 3; .375 INJECTION, POWDER, FOR SOLUTION INTRAVENOUS at 18:30

## 2024-10-04 RX ADMIN — LACTULOSE 30 ML: 10 SOLUTION ORAL at 17:54

## 2024-10-04 RX ADMIN — RIFAXIMIN 400 MG: 200 TABLET ORAL at 18:17

## 2024-10-04 RX ADMIN — PIPERACILLIN AND TAZOBACTAM 3.38 G: 3; .375 INJECTION, POWDER, FOR SOLUTION INTRAVENOUS at 15:46

## 2024-10-04 RX ADMIN — LIDOCAINE HYDROCHLORIDE 6 ML: 20 JELLY TOPICAL at 12:47

## 2024-10-04 ASSESSMENT — PAIN DESCRIPTION - PAIN TYPE: TYPE: ACUTE PAIN

## 2024-10-04 ASSESSMENT — CHA2DS2 SCORE
HYPERTENSION: YES
AGE 75 OR GREATER: NO
CHF OR LEFT VENTRICULAR DYSFUNCTION: YES
VASCULAR DISEASE: NO
DIABETES: YES
CHA2DS2 VASC SCORE: 4
SEX: FEMALE
AGE 65 TO 74: NO
PRIOR STROKE OR TIA OR THROMBOEMBOLISM: NO

## 2024-10-04 ASSESSMENT — FIBROSIS 4 INDEX: FIB4 SCORE: 6.01

## 2024-10-05 LAB
ALBUMIN SERPL BCP-MCNC: 2.5 G/DL (ref 3.2–4.9)
ALBUMIN/GLOB SERPL: 0.5 G/DL
ALP SERPL-CCNC: 159 U/L (ref 30–99)
ALT SERPL-CCNC: 13 U/L (ref 2–50)
AMMONIA PLAS-SCNC: 22 UMOL/L (ref 11–45)
AMMONIA PLAS-SCNC: 47 UMOL/L (ref 11–45)
ANION GAP SERPL CALC-SCNC: 11 MMOL/L (ref 7–16)
ANION GAP SERPL CALC-SCNC: 9 MMOL/L (ref 7–16)
AST SERPL-CCNC: 22 U/L (ref 12–45)
BASOPHILS # BLD AUTO: 1.1 % (ref 0–1.8)
BASOPHILS # BLD: 0.09 K/UL (ref 0–0.12)
BILIRUB SERPL-MCNC: 1.5 MG/DL (ref 0.1–1.5)
BUN SERPL-MCNC: 21 MG/DL (ref 8–22)
BUN SERPL-MCNC: 22 MG/DL (ref 8–22)
CALCIUM ALBUM COR SERPL-MCNC: 9.2 MG/DL (ref 8.5–10.5)
CALCIUM SERPL-MCNC: 7.9 MG/DL (ref 8.5–10.5)
CALCIUM SERPL-MCNC: 8 MG/DL (ref 8.5–10.5)
CHLORIDE SERPL-SCNC: 112 MMOL/L (ref 96–112)
CHLORIDE SERPL-SCNC: 113 MMOL/L (ref 96–112)
CO2 SERPL-SCNC: 17 MMOL/L (ref 20–33)
CO2 SERPL-SCNC: 19 MMOL/L (ref 20–33)
CREAT SERPL-MCNC: 0.86 MG/DL (ref 0.5–1.4)
CREAT SERPL-MCNC: 0.9 MG/DL (ref 0.5–1.4)
CRP SERPL HS-MCNC: 5.69 MG/DL (ref 0–0.75)
EOSINOPHIL # BLD AUTO: 0.11 K/UL (ref 0–0.51)
EOSINOPHIL NFR BLD: 1.4 % (ref 0–6.9)
ERYTHROCYTE [DISTWIDTH] IN BLOOD BY AUTOMATED COUNT: 59.2 FL (ref 35.9–50)
GFR SERPLBLD CREATININE-BSD FMLA CKD-EPI: 71 ML/MIN/1.73 M 2
GFR SERPLBLD CREATININE-BSD FMLA CKD-EPI: 75 ML/MIN/1.73 M 2
GLOBULIN SER CALC-MCNC: 4.6 G/DL (ref 1.9–3.5)
GLUCOSE BLD STRIP.AUTO-MCNC: 112 MG/DL (ref 65–99)
GLUCOSE BLD STRIP.AUTO-MCNC: 124 MG/DL (ref 65–99)
GLUCOSE BLD STRIP.AUTO-MCNC: 130 MG/DL (ref 65–99)
GLUCOSE SERPL-MCNC: 125 MG/DL (ref 65–99)
GLUCOSE SERPL-MCNC: 140 MG/DL (ref 65–99)
HCT VFR BLD AUTO: 30 % (ref 37–47)
HGB BLD-MCNC: 9.5 G/DL (ref 12–16)
IMM GRANULOCYTES # BLD AUTO: 0.03 K/UL (ref 0–0.11)
IMM GRANULOCYTES NFR BLD AUTO: 0.4 % (ref 0–0.9)
LYMPHOCYTES # BLD AUTO: 1.2 K/UL (ref 1–4.8)
LYMPHOCYTES NFR BLD: 15 % (ref 22–41)
MCH RBC QN AUTO: 25.5 PG (ref 27–33)
MCHC RBC AUTO-ENTMCNC: 31.7 G/DL (ref 32.2–35.5)
MCV RBC AUTO: 80.4 FL (ref 81.4–97.8)
MONOCYTES # BLD AUTO: 0.54 K/UL (ref 0–0.85)
MONOCYTES NFR BLD AUTO: 6.7 % (ref 0–13.4)
NEUTROPHILS # BLD AUTO: 6.04 K/UL (ref 1.82–7.42)
NEUTROPHILS NFR BLD: 75.4 % (ref 44–72)
NRBC # BLD AUTO: 0 K/UL
NRBC BLD-RTO: 0 /100 WBC (ref 0–0.2)
PLATELET # BLD AUTO: 129 K/UL (ref 164–446)
PMV BLD AUTO: 9.4 FL (ref 9–12.9)
POTASSIUM SERPL-SCNC: 4.4 MMOL/L (ref 3.6–5.5)
POTASSIUM SERPL-SCNC: 6 MMOL/L (ref 3.6–5.5)
PREALB SERPL-MCNC: 5.4 MG/DL (ref 18–38)
PROT SERPL-MCNC: 7.1 G/DL (ref 6–8.2)
RBC # BLD AUTO: 3.73 M/UL (ref 4.2–5.4)
SODIUM SERPL-SCNC: 140 MMOL/L (ref 135–145)
SODIUM SERPL-SCNC: 141 MMOL/L (ref 135–145)
WBC # BLD AUTO: 8 K/UL (ref 4.8–10.8)

## 2024-10-05 PROCEDURE — 770020 HCHG ROOM/CARE - TELE (206)

## 2024-10-05 PROCEDURE — A9270 NON-COVERED ITEM OR SERVICE: HCPCS | Performed by: INTERNAL MEDICINE

## 2024-10-05 PROCEDURE — A9270 NON-COVERED ITEM OR SERVICE: HCPCS | Performed by: HOSPITALIST

## 2024-10-05 PROCEDURE — 84134 ASSAY OF PREALBUMIN: CPT

## 2024-10-05 PROCEDURE — 99497 ADVNCD CARE PLAN 30 MIN: CPT | Performed by: INTERNAL MEDICINE

## 2024-10-05 PROCEDURE — 99233 SBSQ HOSP IP/OBS HIGH 50: CPT | Mod: 25,GC | Performed by: INTERNAL MEDICINE

## 2024-10-05 PROCEDURE — 700111 HCHG RX REV CODE 636 W/ 250 OVERRIDE (IP): Mod: JZ | Performed by: NURSE PRACTITIONER

## 2024-10-05 PROCEDURE — 700111 HCHG RX REV CODE 636 W/ 250 OVERRIDE (IP): Mod: JZ | Performed by: HOSPITALIST

## 2024-10-05 PROCEDURE — 700105 HCHG RX REV CODE 258: Performed by: HOSPITALIST

## 2024-10-05 PROCEDURE — 80053 COMPREHEN METABOLIC PANEL: CPT

## 2024-10-05 PROCEDURE — 82962 GLUCOSE BLOOD TEST: CPT

## 2024-10-05 PROCEDURE — 80048 BASIC METABOLIC PNL TOTAL CA: CPT

## 2024-10-05 PROCEDURE — 82140 ASSAY OF AMMONIA: CPT

## 2024-10-05 PROCEDURE — 700102 HCHG RX REV CODE 250 W/ 637 OVERRIDE(OP): Performed by: INTERNAL MEDICINE

## 2024-10-05 PROCEDURE — 36415 COLL VENOUS BLD VENIPUNCTURE: CPT

## 2024-10-05 PROCEDURE — 700102 HCHG RX REV CODE 250 W/ 637 OVERRIDE(OP): Performed by: HOSPITALIST

## 2024-10-05 PROCEDURE — 86140 C-REACTIVE PROTEIN: CPT

## 2024-10-05 PROCEDURE — 85025 COMPLETE CBC W/AUTO DIFF WBC: CPT

## 2024-10-05 RX ORDER — MICONAZOLE NITRATE 20 MG/G
CREAM TOPICAL 2 TIMES DAILY
Status: DISCONTINUED | OUTPATIENT
Start: 2024-10-05 | End: 2024-10-07 | Stop reason: HOSPADM

## 2024-10-05 RX ORDER — PANTOPRAZOLE SODIUM 20 MG/1
20 TABLET, DELAYED RELEASE ORAL EVERY MORNING
Status: DISCONTINUED | OUTPATIENT
Start: 2024-10-05 | End: 2024-10-05

## 2024-10-05 RX ORDER — HALOPERIDOL 5 MG/ML
5 INJECTION INTRAMUSCULAR EVERY 6 HOURS PRN
Status: DISCONTINUED | OUTPATIENT
Start: 2024-10-05 | End: 2024-10-07 | Stop reason: HOSPADM

## 2024-10-05 RX ORDER — ATORVASTATIN CALCIUM 20 MG/1
20 TABLET, FILM COATED ORAL NIGHTLY
Status: DISCONTINUED | OUTPATIENT
Start: 2024-10-05 | End: 2024-10-05

## 2024-10-05 RX ORDER — AMOXICILLIN 250 MG
2 CAPSULE ORAL EVERY EVENING
Status: DISCONTINUED | OUTPATIENT
Start: 2024-10-06 | End: 2024-10-07 | Stop reason: HOSPADM

## 2024-10-05 RX ORDER — POLYETHYLENE GLYCOL 3350 17 G/17G
1 POWDER, FOR SOLUTION ORAL
Status: DISCONTINUED | OUTPATIENT
Start: 2024-10-05 | End: 2024-10-05

## 2024-10-05 RX ORDER — ALBUTEROL SULFATE 90 UG/1
2 INHALANT RESPIRATORY (INHALATION) EVERY 4 HOURS PRN
Status: DISCONTINUED | OUTPATIENT
Start: 2024-10-05 | End: 2024-10-07 | Stop reason: HOSPADM

## 2024-10-05 RX ORDER — POLYETHYLENE GLYCOL 3350 17 G/17G
1 POWDER, FOR SOLUTION ORAL
Status: DISCONTINUED | OUTPATIENT
Start: 2024-10-05 | End: 2024-10-07 | Stop reason: HOSPADM

## 2024-10-05 RX ORDER — METOPROLOL TARTRATE 25 MG/1
25 TABLET, FILM COATED ORAL EVERY 8 HOURS
Status: DISCONTINUED | OUTPATIENT
Start: 2024-10-05 | End: 2024-10-05

## 2024-10-05 RX ORDER — AMOXICILLIN 250 MG
2 CAPSULE ORAL EVERY EVENING
Status: DISCONTINUED | OUTPATIENT
Start: 2024-10-06 | End: 2024-10-05

## 2024-10-05 RX ORDER — LANSOPRAZOLE 30 MG/1
30 TABLET, ORALLY DISINTEGRATING, DELAYED RELEASE ORAL DAILY
Status: DISCONTINUED | OUTPATIENT
Start: 2024-10-06 | End: 2024-10-05

## 2024-10-05 RX ORDER — LANSOPRAZOLE 30 MG/1
30 TABLET, ORALLY DISINTEGRATING, DELAYED RELEASE ORAL DAILY
Status: DISCONTINUED | OUTPATIENT
Start: 2024-10-05 | End: 2024-10-05

## 2024-10-05 RX ORDER — ATORVASTATIN CALCIUM 20 MG/1
20 TABLET, FILM COATED ORAL NIGHTLY
Status: DISCONTINUED | OUTPATIENT
Start: 2024-10-05 | End: 2024-10-07 | Stop reason: HOSPADM

## 2024-10-05 RX ORDER — METOPROLOL TARTRATE 25 MG/1
25 TABLET, FILM COATED ORAL EVERY 8 HOURS
Status: DISCONTINUED | OUTPATIENT
Start: 2024-10-05 | End: 2024-10-07 | Stop reason: HOSPADM

## 2024-10-05 RX ADMIN — PIPERACILLIN AND TAZOBACTAM 3.38 G: 3; .375 INJECTION, POWDER, FOR SOLUTION INTRAVENOUS at 12:28

## 2024-10-05 RX ADMIN — ATORVASTATIN CALCIUM 20 MG: 20 TABLET, FILM COATED ORAL at 23:01

## 2024-10-05 RX ADMIN — SODIUM CHLORIDE, POTASSIUM CHLORIDE, SODIUM LACTATE AND CALCIUM CHLORIDE: 600; 310; 30; 20 INJECTION, SOLUTION INTRAVENOUS at 04:25

## 2024-10-05 RX ADMIN — HALOPERIDOL LACTATE 5 MG: 5 INJECTION, SOLUTION INTRAMUSCULAR at 12:37

## 2024-10-05 RX ADMIN — MICONAZOLE NITRATE: 2 CREAM TOPICAL at 23:00

## 2024-10-05 RX ADMIN — LANSOPRAZOLE 30 MG: 30 TABLET, ORALLY DISINTEGRATING ORAL at 10:41

## 2024-10-05 RX ADMIN — METOPROLOL TARTRATE 25 MG: 25 TABLET, FILM COATED ORAL at 17:10

## 2024-10-05 RX ADMIN — HALOPERIDOL LACTATE 5 MG: 5 INJECTION, SOLUTION INTRAMUSCULAR at 21:01

## 2024-10-05 RX ADMIN — METOPROLOL TARTRATE 25 MG: 25 TABLET, FILM COATED ORAL at 23:02

## 2024-10-05 RX ADMIN — APIXABAN 5 MG: 5 TABLET, FILM COATED ORAL at 17:10

## 2024-10-05 RX ADMIN — PIPERACILLIN AND TAZOBACTAM 3.38 G: 3; .375 INJECTION, POWDER, FOR SOLUTION INTRAVENOUS at 04:27

## 2024-10-05 RX ADMIN — METOPROLOL TARTRATE 25 MG: 25 TABLET, FILM COATED ORAL at 04:27

## 2024-10-05 RX ADMIN — RIFAXIMIN 400 MG: 200 TABLET ORAL at 04:28

## 2024-10-05 RX ADMIN — SODIUM CHLORIDE, POTASSIUM CHLORIDE, SODIUM LACTATE AND CALCIUM CHLORIDE: 600; 310; 30; 20 INJECTION, SOLUTION INTRAVENOUS at 17:20

## 2024-10-05 RX ADMIN — LACTULOSE 30 ML: 10 SOLUTION ORAL at 04:27

## 2024-10-05 RX ADMIN — HALOPERIDOL LACTATE 5 MG: 5 INJECTION, SOLUTION INTRAMUSCULAR at 04:41

## 2024-10-05 RX ADMIN — PIPERACILLIN AND TAZOBACTAM 3.38 G: 3; .375 INJECTION, POWDER, FOR SOLUTION INTRAVENOUS at 20:46

## 2024-10-05 RX ADMIN — RIFAXIMIN 400 MG: 200 TABLET ORAL at 17:10

## 2024-10-05 RX ADMIN — APIXABAN 5 MG: 5 TABLET, FILM COATED ORAL at 04:28

## 2024-10-05 ASSESSMENT — FIBROSIS 4 INDEX: FIB4 SCORE: 2.49

## 2024-10-05 ASSESSMENT — PAIN DESCRIPTION - PAIN TYPE
TYPE: ACUTE PAIN
TYPE: ACUTE PAIN;CHRONIC PAIN

## 2024-10-05 ASSESSMENT — COGNITIVE AND FUNCTIONAL STATUS - GENERAL
TURNING FROM BACK TO SIDE WHILE IN FLAT BAD: TOTAL
MOVING FROM LYING ON BACK TO SITTING ON SIDE OF FLAT BED: A LOT
MOVING TO AND FROM BED TO CHAIR: TOTAL
CLIMB 3 TO 5 STEPS WITH RAILING: TOTAL
SUGGESTED CMS G CODE MODIFIER MOBILITY: CM
WALKING IN HOSPITAL ROOM: TOTAL
SUGGESTED CMS G CODE MODIFIER DAILY ACTIVITY: CN
DAILY ACTIVITIY SCORE: 6
EATING MEALS: TOTAL
MOBILITY SCORE: 7
TOILETING: TOTAL
DRESSING REGULAR LOWER BODY CLOTHING: TOTAL
PERSONAL GROOMING: TOTAL
DRESSING REGULAR UPPER BODY CLOTHING: TOTAL
STANDING UP FROM CHAIR USING ARMS: TOTAL
HELP NEEDED FOR BATHING: TOTAL

## 2024-10-06 PROBLEM — D63.8 ANEMIA IN OTHER CHRONIC DISEASES CLASSIFIED ELSEWHERE: Status: ACTIVE | Noted: 2024-01-12

## 2024-10-06 LAB
AMMONIA PLAS-SCNC: 32 UMOL/L (ref 11–45)
ANION GAP SERPL CALC-SCNC: 14 MMOL/L (ref 7–16)
BACTERIA UR CULT: ABNORMAL
BUN SERPL-MCNC: 18 MG/DL (ref 8–22)
CALCIUM SERPL-MCNC: 8.4 MG/DL (ref 8.5–10.5)
CHLORIDE SERPL-SCNC: 108 MMOL/L (ref 96–112)
CO2 SERPL-SCNC: 16 MMOL/L (ref 20–33)
CREAT SERPL-MCNC: 0.82 MG/DL (ref 0.5–1.4)
ERYTHROCYTE [DISTWIDTH] IN BLOOD BY AUTOMATED COUNT: 57.4 FL (ref 35.9–50)
GFR SERPLBLD CREATININE-BSD FMLA CKD-EPI: 80 ML/MIN/1.73 M 2
GLUCOSE BLD STRIP.AUTO-MCNC: 111 MG/DL (ref 65–99)
GLUCOSE BLD STRIP.AUTO-MCNC: 126 MG/DL (ref 65–99)
GLUCOSE BLD STRIP.AUTO-MCNC: 138 MG/DL (ref 65–99)
GLUCOSE BLD STRIP.AUTO-MCNC: 146 MG/DL (ref 65–99)
GLUCOSE SERPL-MCNC: 121 MG/DL (ref 65–99)
HCT VFR BLD AUTO: 24 % (ref 37–47)
HGB BLD-MCNC: 7.8 G/DL (ref 12–16)
MCH RBC QN AUTO: 25.9 PG (ref 27–33)
MCHC RBC AUTO-ENTMCNC: 32.5 G/DL (ref 32.2–35.5)
MCV RBC AUTO: 79.7 FL (ref 81.4–97.8)
PLATELET # BLD AUTO: 116 K/UL (ref 164–446)
PMV BLD AUTO: 9.2 FL (ref 9–12.9)
POTASSIUM SERPL-SCNC: 3.9 MMOL/L (ref 3.6–5.5)
RBC # BLD AUTO: 3.01 M/UL (ref 4.2–5.4)
SIGNIFICANT IND 70042: ABNORMAL
SITE SITE: ABNORMAL
SODIUM SERPL-SCNC: 138 MMOL/L (ref 135–145)
SOURCE SOURCE: ABNORMAL
WBC # BLD AUTO: 6.9 K/UL (ref 4.8–10.8)

## 2024-10-06 PROCEDURE — 700102 HCHG RX REV CODE 250 W/ 637 OVERRIDE(OP): Performed by: INTERNAL MEDICINE

## 2024-10-06 PROCEDURE — 770020 HCHG ROOM/CARE - TELE (206)

## 2024-10-06 PROCEDURE — A9270 NON-COVERED ITEM OR SERVICE: HCPCS | Performed by: INTERNAL MEDICINE

## 2024-10-06 PROCEDURE — 700111 HCHG RX REV CODE 636 W/ 250 OVERRIDE (IP): Mod: JZ | Performed by: NURSE PRACTITIONER

## 2024-10-06 PROCEDURE — 700111 HCHG RX REV CODE 636 W/ 250 OVERRIDE (IP): Mod: JZ | Performed by: HOSPITALIST

## 2024-10-06 PROCEDURE — 80048 BASIC METABOLIC PNL TOTAL CA: CPT

## 2024-10-06 PROCEDURE — 85027 COMPLETE CBC AUTOMATED: CPT

## 2024-10-06 PROCEDURE — 99232 SBSQ HOSP IP/OBS MODERATE 35: CPT | Mod: GC | Performed by: INTERNAL MEDICINE

## 2024-10-06 PROCEDURE — 82962 GLUCOSE BLOOD TEST: CPT | Mod: 91

## 2024-10-06 PROCEDURE — 36415 COLL VENOUS BLD VENIPUNCTURE: CPT

## 2024-10-06 PROCEDURE — 700105 HCHG RX REV CODE 258: Performed by: HOSPITALIST

## 2024-10-06 PROCEDURE — 82140 ASSAY OF AMMONIA: CPT

## 2024-10-06 RX ADMIN — PIPERACILLIN AND TAZOBACTAM 3.38 G: 3; .375 INJECTION, POWDER, FOR SOLUTION INTRAVENOUS at 21:14

## 2024-10-06 RX ADMIN — MICONAZOLE NITRATE: 2 CREAM TOPICAL at 06:05

## 2024-10-06 RX ADMIN — MICONAZOLE NITRATE: 2 CREAM TOPICAL at 17:27

## 2024-10-06 RX ADMIN — APIXABAN 5 MG: 5 TABLET, FILM COATED ORAL at 05:59

## 2024-10-06 RX ADMIN — HALOPERIDOL LACTATE 5 MG: 5 INJECTION, SOLUTION INTRAMUSCULAR at 15:20

## 2024-10-06 RX ADMIN — METOPROLOL TARTRATE 25 MG: 25 TABLET, FILM COATED ORAL at 15:20

## 2024-10-06 RX ADMIN — METOPROLOL TARTRATE 25 MG: 25 TABLET, FILM COATED ORAL at 05:59

## 2024-10-06 RX ADMIN — RIFAXIMIN 550 MG: 550 TABLET ORAL at 05:59

## 2024-10-06 RX ADMIN — METOPROLOL TARTRATE 25 MG: 25 TABLET, FILM COATED ORAL at 22:03

## 2024-10-06 RX ADMIN — PIPERACILLIN AND TAZOBACTAM 3.38 G: 3; .375 INJECTION, POWDER, FOR SOLUTION INTRAVENOUS at 12:37

## 2024-10-06 RX ADMIN — ATORVASTATIN CALCIUM 20 MG: 20 TABLET, FILM COATED ORAL at 21:45

## 2024-10-06 RX ADMIN — PIPERACILLIN AND TAZOBACTAM 3.38 G: 3; .375 INJECTION, POWDER, FOR SOLUTION INTRAVENOUS at 05:58

## 2024-10-06 RX ADMIN — RIFAXIMIN 550 MG: 550 TABLET ORAL at 17:19

## 2024-10-06 RX ADMIN — HALOPERIDOL LACTATE 5 MG: 5 INJECTION, SOLUTION INTRAMUSCULAR at 21:46

## 2024-10-06 RX ADMIN — APIXABAN 5 MG: 5 TABLET, FILM COATED ORAL at 17:19

## 2024-10-06 RX ADMIN — SODIUM CHLORIDE, POTASSIUM CHLORIDE, SODIUM LACTATE AND CALCIUM CHLORIDE: 600; 310; 30; 20 INJECTION, SOLUTION INTRAVENOUS at 04:55

## 2024-10-06 RX ADMIN — OMEPRAZOLE 20 MG: 20 CAPSULE, DELAYED RELEASE ORAL at 05:59

## 2024-10-06 ASSESSMENT — FIBROSIS 4 INDEX: FIB4 SCORE: 3.37

## 2024-10-06 ASSESSMENT — PAIN DESCRIPTION - PAIN TYPE: TYPE: ACUTE PAIN

## 2024-10-07 VITALS
OXYGEN SATURATION: 99 % | WEIGHT: 194 LBS | HEIGHT: 68 IN | SYSTOLIC BLOOD PRESSURE: 144 MMHG | RESPIRATION RATE: 15 BRPM | TEMPERATURE: 98.4 F | DIASTOLIC BLOOD PRESSURE: 97 MMHG | BODY MASS INDEX: 29.4 KG/M2 | HEART RATE: 71 BPM

## 2024-10-07 LAB
ERYTHROCYTE [DISTWIDTH] IN BLOOD BY AUTOMATED COUNT: 58.3 FL (ref 35.9–50)
GLUCOSE BLD STRIP.AUTO-MCNC: 130 MG/DL (ref 65–99)
GLUCOSE BLD STRIP.AUTO-MCNC: 134 MG/DL (ref 65–99)
GLUCOSE BLD STRIP.AUTO-MCNC: 138 MG/DL (ref 65–99)
HCT VFR BLD AUTO: 23 % (ref 37–47)
HGB BLD-MCNC: 7.3 G/DL (ref 12–16)
MCH RBC QN AUTO: 25.5 PG (ref 27–33)
MCHC RBC AUTO-ENTMCNC: 31.7 G/DL (ref 32.2–35.5)
MCV RBC AUTO: 80.4 FL (ref 81.4–97.8)
PLATELET # BLD AUTO: 97 K/UL (ref 164–446)
PLATELETS.RETICULATED NFR BLD AUTO: 0.5 % (ref 0.6–13.1)
PMV BLD AUTO: 10.3 FL (ref 9–12.9)
RBC # BLD AUTO: 2.86 M/UL (ref 4.2–5.4)
WBC # BLD AUTO: 5.2 K/UL (ref 4.8–10.8)

## 2024-10-07 PROCEDURE — 82962 GLUCOSE BLOOD TEST: CPT | Mod: 91

## 2024-10-07 PROCEDURE — 700102 HCHG RX REV CODE 250 W/ 637 OVERRIDE(OP)

## 2024-10-07 PROCEDURE — 700105 HCHG RX REV CODE 258: Performed by: HOSPITALIST

## 2024-10-07 PROCEDURE — A9270 NON-COVERED ITEM OR SERVICE: HCPCS | Performed by: INTERNAL MEDICINE

## 2024-10-07 PROCEDURE — 700111 HCHG RX REV CODE 636 W/ 250 OVERRIDE (IP): Mod: JZ | Performed by: HOSPITALIST

## 2024-10-07 PROCEDURE — 99239 HOSP IP/OBS DSCHRG MGMT >30: CPT | Mod: GC | Performed by: INTERNAL MEDICINE

## 2024-10-07 PROCEDURE — 36415 COLL VENOUS BLD VENIPUNCTURE: CPT

## 2024-10-07 PROCEDURE — 85027 COMPLETE CBC AUTOMATED: CPT

## 2024-10-07 PROCEDURE — A9270 NON-COVERED ITEM OR SERVICE: HCPCS

## 2024-10-07 PROCEDURE — 85055 RETICULATED PLATELET ASSAY: CPT

## 2024-10-07 PROCEDURE — 700102 HCHG RX REV CODE 250 W/ 637 OVERRIDE(OP): Performed by: INTERNAL MEDICINE

## 2024-10-07 RX ORDER — SULFAMETHOXAZOLE AND TRIMETHOPRIM 800; 160 MG/1; MG/1
1 TABLET ORAL EVERY 12 HOURS
Qty: 8 TABLET | Refills: 0 | Status: ACTIVE | OUTPATIENT
Start: 2024-10-07 | End: 2024-10-11

## 2024-10-07 RX ORDER — SULFAMETHOXAZOLE AND TRIMETHOPRIM 800; 160 MG/1; MG/1
1 TABLET ORAL EVERY 12 HOURS
Status: DISCONTINUED | OUTPATIENT
Start: 2024-10-07 | End: 2024-10-07

## 2024-10-07 RX ORDER — OXYCODONE HYDROCHLORIDE 5 MG/1
5 TABLET ORAL EVERY 6 HOURS PRN
Qty: 12 TABLET | Refills: 0 | Status: SHIPPED | OUTPATIENT
Start: 2024-10-07 | End: 2024-10-11

## 2024-10-07 RX ORDER — SULFAMETHOXAZOLE AND TRIMETHOPRIM 800; 160 MG/1; MG/1
1 TABLET ORAL EVERY 12 HOURS
Status: DISCONTINUED | OUTPATIENT
Start: 2024-10-07 | End: 2024-10-07 | Stop reason: HOSPADM

## 2024-10-07 RX ADMIN — APIXABAN 5 MG: 5 TABLET, FILM COATED ORAL at 05:20

## 2024-10-07 RX ADMIN — RIFAXIMIN 550 MG: 550 TABLET ORAL at 05:20

## 2024-10-07 RX ADMIN — OMEPRAZOLE 20 MG: 20 CAPSULE, DELAYED RELEASE ORAL at 05:20

## 2024-10-07 RX ADMIN — MICONAZOLE NITRATE: 2 CREAM TOPICAL at 05:21

## 2024-10-07 RX ADMIN — METOPROLOL TARTRATE 25 MG: 25 TABLET, FILM COATED ORAL at 14:13

## 2024-10-07 RX ADMIN — METOPROLOL TARTRATE 25 MG: 25 TABLET, FILM COATED ORAL at 05:20

## 2024-10-07 RX ADMIN — SULFAMETHOXAZOLE AND TRIMETHOPRIM 1 TABLET: 800; 160 TABLET ORAL at 11:08

## 2024-10-07 RX ADMIN — PIPERACILLIN AND TAZOBACTAM 3.38 G: 3; .375 INJECTION, POWDER, FOR SOLUTION INTRAVENOUS at 05:22

## 2024-10-07 ASSESSMENT — ENCOUNTER SYMPTOMS
PSYCHIATRIC NEGATIVE: 1
NEUROLOGICAL NEGATIVE: 1
EYES NEGATIVE: 1
CONSTITUTIONAL NEGATIVE: 1
MUSCULOSKELETAL NEGATIVE: 1
CARDIOVASCULAR NEGATIVE: 1
RESPIRATORY NEGATIVE: 1
GASTROINTESTINAL NEGATIVE: 1

## 2024-10-07 ASSESSMENT — SOCIAL DETERMINANTS OF HEALTH (SDOH)
WITHIN THE LAST YEAR, HAVE YOU BEEN AFRAID OF YOUR PARTNER OR EX-PARTNER?: NO
WITHIN THE PAST 12 MONTHS, THE FOOD YOU BOUGHT JUST DIDN'T LAST AND YOU DIDN'T HAVE MONEY TO GET MORE: NEVER TRUE
WITHIN THE PAST 12 MONTHS, YOU WORRIED THAT YOUR FOOD WOULD RUN OUT BEFORE YOU GOT THE MONEY TO BUY MORE: NEVER TRUE
WITHIN THE PAST 12 MONTHS, YOU WORRIED THAT YOUR FOOD WOULD RUN OUT BEFORE YOU GOT THE MONEY TO BUY MORE: NEVER TRUE
IN THE PAST 12 MONTHS, HAS THE ELECTRIC, GAS, OIL, OR WATER COMPANY THREATENED TO SHUT OFF SERVICE IN YOUR HOME?: NO
WITHIN THE LAST YEAR, HAVE YOU BEEN KICKED, HIT, SLAPPED, OR OTHERWISE PHYSICALLY HURT BY YOUR PARTNER OR EX-PARTNER?: NO
IN THE PAST 12 MONTHS, HAS THE ELECTRIC, GAS, OIL, OR WATER COMPANY THREATENED TO SHUT OFF SERVICE IN YOUR HOME?: NO
WITHIN THE PAST 12 MONTHS, THE FOOD YOU BOUGHT JUST DIDN'T LAST AND YOU DIDN'T HAVE MONEY TO GET MORE: NEVER TRUE
WITHIN THE LAST YEAR, HAVE YOU BEEN HUMILIATED OR EMOTIONALLY ABUSED IN OTHER WAYS BY YOUR PARTNER OR EX-PARTNER?: NO
WITHIN THE LAST YEAR, HAVE TO BEEN RAPED OR FORCED TO HAVE ANY KIND OF SEXUAL ACTIVITY BY YOUR PARTNER OR EX-PARTNER?: NO

## 2024-10-07 ASSESSMENT — FIBROSIS 4 INDEX
FIB4 SCORE: 3.37
FIB4 SCORE: 4.03

## 2024-10-07 ASSESSMENT — PAIN DESCRIPTION - PAIN TYPE
TYPE: ACUTE PAIN;CHRONIC PAIN
TYPE: ACUTE PAIN;CHRONIC PAIN

## 2024-10-14 LAB — C AURIS DNA SPEC QL NAA+PROBE: NOT DETECTED

## 2024-10-15 ENCOUNTER — APPOINTMENT (OUTPATIENT)
Dept: RADIOLOGY | Facility: MEDICAL CENTER | Age: 64
End: 2024-10-15
Attending: EMERGENCY MEDICINE
Payer: MEDICAID

## 2024-10-15 ENCOUNTER — APPOINTMENT (OUTPATIENT)
Dept: PHYSICAL THERAPY | Facility: REHABILITATION | Age: 64
End: 2024-10-15
Attending: INTERNAL MEDICINE
Payer: MEDICAID

## 2024-10-15 ENCOUNTER — HOSPITAL ENCOUNTER (OUTPATIENT)
Facility: MEDICAL CENTER | Age: 64
End: 2024-10-16
Attending: EMERGENCY MEDICINE | Admitting: HOSPITALIST
Payer: MEDICAID

## 2024-10-15 DIAGNOSIS — I48.91 ATRIAL FIBRILLATION, UNSPECIFIED TYPE (HCC): ICD-10-CM

## 2024-10-15 DIAGNOSIS — R07.9 CHEST PAIN, UNSPECIFIED TYPE: ICD-10-CM

## 2024-10-15 DIAGNOSIS — Z79.01 ANTICOAGULATED: ICD-10-CM

## 2024-10-15 DIAGNOSIS — I48.0 PAROXYSMAL ATRIAL FIBRILLATION (HCC): ICD-10-CM

## 2024-10-15 DIAGNOSIS — D64.9 ANEMIA, UNSPECIFIED TYPE: ICD-10-CM

## 2024-10-15 PROBLEM — R07.89 OTHER CHEST PAIN: Status: ACTIVE | Noted: 2024-10-15

## 2024-10-15 PROBLEM — R94.31 PROLONGED Q-T INTERVAL ON ECG: Status: ACTIVE | Noted: 2024-10-15

## 2024-10-15 LAB
ABO GROUP BLD: NORMAL
ACANTHOCYTES BLD QL SMEAR: NORMAL
ALBUMIN SERPL BCP-MCNC: 2.4 G/DL (ref 3.2–4.9)
ALBUMIN/GLOB SERPL: 0.6 G/DL
ALP SERPL-CCNC: 196 U/L (ref 30–99)
ALT SERPL-CCNC: 12 U/L (ref 2–50)
AMMONIA PLAS-SCNC: 38 UMOL/L (ref 11–45)
ANION GAP SERPL CALC-SCNC: 7 MMOL/L (ref 7–16)
ANISOCYTOSIS BLD QL SMEAR: ABNORMAL
AST SERPL-CCNC: 28 U/L (ref 12–45)
BARCODED ABORH UBTYP: 5100
BARCODED PRD CODE UBPRD: NORMAL
BARCODED UNIT NUM UBUNT: NORMAL
BASOPHILS # BLD AUTO: 6 % (ref 0–1.8)
BASOPHILS # BLD: 0.24 K/UL (ref 0–0.12)
BILIRUB SERPL-MCNC: 0.6 MG/DL (ref 0.1–1.5)
BLD GP AB SCN SERPL QL: NORMAL
BUN SERPL-MCNC: 17 MG/DL (ref 8–22)
CALCIUM ALBUM COR SERPL-MCNC: 9.2 MG/DL (ref 8.5–10.5)
CALCIUM SERPL-MCNC: 7.9 MG/DL (ref 8.5–10.5)
CHLORIDE SERPL-SCNC: 108 MMOL/L (ref 96–112)
CO2 SERPL-SCNC: 18 MMOL/L (ref 20–33)
COMPONENT R 8504R: NORMAL
CREAT SERPL-MCNC: 0.89 MG/DL (ref 0.5–1.4)
EKG IMPRESSION: NORMAL
EOSINOPHIL # BLD AUTO: 0.28 K/UL (ref 0–0.51)
EOSINOPHIL NFR BLD: 6.9 % (ref 0–6.9)
ERYTHROCYTE [DISTWIDTH] IN BLOOD BY AUTOMATED COUNT: 66.7 FL (ref 35.9–50)
ERYTHROCYTE [DISTWIDTH] IN BLOOD BY AUTOMATED COUNT: 67 FL (ref 35.9–50)
EST. AVERAGE GLUCOSE BLD GHB EST-MCNC: 100 MG/DL
FERRITIN SERPL-MCNC: 108 NG/ML (ref 10–291)
GFR SERPLBLD CREATININE-BSD FMLA CKD-EPI: 72 ML/MIN/1.73 M 2
GLOBULIN SER CALC-MCNC: 4.2 G/DL (ref 1.9–3.5)
GLUCOSE BLD STRIP.AUTO-MCNC: 121 MG/DL (ref 65–99)
GLUCOSE BLD STRIP.AUTO-MCNC: 151 MG/DL (ref 65–99)
GLUCOSE SERPL-MCNC: 89 MG/DL (ref 65–99)
HBA1C MFR BLD: 5.1 % (ref 4–5.6)
HCT VFR BLD AUTO: 22.3 % (ref 37–47)
HCT VFR BLD AUTO: 24.8 % (ref 37–47)
HGB BLD-MCNC: 6.8 G/DL (ref 12–16)
HGB BLD-MCNC: 7.7 G/DL (ref 12–16)
HYPOCHROMIA BLD QL SMEAR: ABNORMAL
INR PPP: 1.69 (ref 0.87–1.13)
IRON SATN MFR SERPL: 15 % (ref 15–55)
IRON SERPL-MCNC: 27 UG/DL (ref 40–170)
LYMPHOCYTES # BLD AUTO: 0.34 K/UL (ref 1–4.8)
LYMPHOCYTES NFR BLD: 8.6 % (ref 22–41)
MACROCYTES BLD QL SMEAR: ABNORMAL
MANUAL DIFF BLD: NORMAL
MCH RBC QN AUTO: 26 PG (ref 27–33)
MCH RBC QN AUTO: 26.5 PG (ref 27–33)
MCHC RBC AUTO-ENTMCNC: 30.5 G/DL (ref 32.2–35.5)
MCHC RBC AUTO-ENTMCNC: 31 G/DL (ref 32.2–35.5)
MCV RBC AUTO: 85.1 FL (ref 81.4–97.8)
MCV RBC AUTO: 85.2 FL (ref 81.4–97.8)
MONOCYTES # BLD AUTO: 0.04 K/UL (ref 0–0.85)
MONOCYTES NFR BLD AUTO: 0.9 % (ref 0–13.4)
MORPHOLOGY BLD-IMP: NORMAL
NEUTROPHILS # BLD AUTO: 3.1 K/UL (ref 1.82–7.42)
NEUTROPHILS NFR BLD: 77.6 % (ref 44–72)
NRBC # BLD AUTO: 0 K/UL
NRBC BLD-RTO: 0 /100 WBC (ref 0–0.2)
NT-PROBNP SERPL IA-MCNC: 2111 PG/ML (ref 0–125)
OVALOCYTES BLD QL SMEAR: NORMAL
PLATELET # BLD AUTO: 106 K/UL (ref 164–446)
PLATELET # BLD AUTO: 92 K/UL (ref 164–446)
PLATELET BLD QL SMEAR: NORMAL
PLATELETS.RETICULATED NFR BLD AUTO: 1.6 % (ref 0.6–13.1)
PMV BLD AUTO: 10 FL (ref 9–12.9)
PMV BLD AUTO: 9.9 FL (ref 9–12.9)
POIKILOCYTOSIS BLD QL SMEAR: NORMAL
POTASSIUM SERPL-SCNC: 5.1 MMOL/L (ref 3.6–5.5)
PRODUCT TYPE UPROD: NORMAL
PROT SERPL-MCNC: 6.6 G/DL (ref 6–8.2)
PROTHROMBIN TIME: 20 SEC (ref 12–14.6)
RBC # BLD AUTO: 2.62 M/UL (ref 4.2–5.4)
RBC # BLD AUTO: 2.91 M/UL (ref 4.2–5.4)
RBC BLD AUTO: PRESENT
RH BLD: NORMAL
SCHISTOCYTES BLD QL SMEAR: NORMAL
SODIUM SERPL-SCNC: 133 MMOL/L (ref 135–145)
TIBC SERPL-MCNC: 179 UG/DL (ref 250–450)
TROPONIN T SERPL-MCNC: 19 NG/L (ref 6–19)
TROPONIN T SERPL-MCNC: 20 NG/L (ref 6–19)
UIBC SERPL-MCNC: 152 UG/DL (ref 110–370)
UNIT STATUS USTAT: NORMAL
VIT B12 SERPL-MCNC: 762 PG/ML (ref 211–911)
WBC # BLD AUTO: 4 K/UL (ref 4.8–10.8)
WBC # BLD AUTO: 4.1 K/UL (ref 4.8–10.8)

## 2024-10-15 PROCEDURE — 71045 X-RAY EXAM CHEST 1 VIEW: CPT

## 2024-10-15 PROCEDURE — 86900 BLOOD TYPING SEROLOGIC ABO: CPT

## 2024-10-15 PROCEDURE — 83880 ASSAY OF NATRIURETIC PEPTIDE: CPT

## 2024-10-15 PROCEDURE — 82607 VITAMIN B-12: CPT

## 2024-10-15 PROCEDURE — 82962 GLUCOSE BLOOD TEST: CPT

## 2024-10-15 PROCEDURE — 74174 CTA ABD&PLVS W/CONTRAST: CPT

## 2024-10-15 PROCEDURE — G0378 HOSPITAL OBSERVATION PER HR: HCPCS

## 2024-10-15 PROCEDURE — 84484 ASSAY OF TROPONIN QUANT: CPT

## 2024-10-15 PROCEDURE — 85027 COMPLETE CBC AUTOMATED: CPT

## 2024-10-15 PROCEDURE — 85055 RETICULATED PLATELET ASSAY: CPT

## 2024-10-15 PROCEDURE — 99285 EMERGENCY DEPT VISIT HI MDM: CPT

## 2024-10-15 PROCEDURE — 700117 HCHG RX CONTRAST REV CODE 255: Mod: UD | Performed by: EMERGENCY MEDICINE

## 2024-10-15 PROCEDURE — 36430 TRANSFUSION BLD/BLD COMPNT: CPT

## 2024-10-15 PROCEDURE — 83540 ASSAY OF IRON: CPT

## 2024-10-15 PROCEDURE — 36415 COLL VENOUS BLD VENIPUNCTURE: CPT

## 2024-10-15 PROCEDURE — 86923 COMPATIBILITY TEST ELECTRIC: CPT

## 2024-10-15 PROCEDURE — 82728 ASSAY OF FERRITIN: CPT

## 2024-10-15 PROCEDURE — 80053 COMPREHEN METABOLIC PANEL: CPT

## 2024-10-15 PROCEDURE — 700102 HCHG RX REV CODE 250 W/ 637 OVERRIDE(OP): Mod: UD | Performed by: HOSPITALIST

## 2024-10-15 PROCEDURE — 700111 HCHG RX REV CODE 636 W/ 250 OVERRIDE (IP): Mod: JZ,UD

## 2024-10-15 PROCEDURE — 86901 BLOOD TYPING SEROLOGIC RH(D): CPT

## 2024-10-15 PROCEDURE — 93005 ELECTROCARDIOGRAM TRACING: CPT | Performed by: EMERGENCY MEDICINE

## 2024-10-15 PROCEDURE — 86850 RBC ANTIBODY SCREEN: CPT

## 2024-10-15 PROCEDURE — P9016 RBC LEUKOCYTES REDUCED: HCPCS

## 2024-10-15 PROCEDURE — 85007 BL SMEAR W/DIFF WBC COUNT: CPT

## 2024-10-15 PROCEDURE — A9270 NON-COVERED ITEM OR SERVICE: HCPCS | Mod: UD

## 2024-10-15 PROCEDURE — 83036 HEMOGLOBIN GLYCOSYLATED A1C: CPT

## 2024-10-15 PROCEDURE — A9270 NON-COVERED ITEM OR SERVICE: HCPCS | Mod: UD | Performed by: HOSPITALIST

## 2024-10-15 PROCEDURE — 700102 HCHG RX REV CODE 250 W/ 637 OVERRIDE(OP): Mod: UD

## 2024-10-15 PROCEDURE — 83550 IRON BINDING TEST: CPT

## 2024-10-15 PROCEDURE — 99223 1ST HOSP IP/OBS HIGH 75: CPT | Performed by: HOSPITALIST

## 2024-10-15 PROCEDURE — 82140 ASSAY OF AMMONIA: CPT

## 2024-10-15 PROCEDURE — 85610 PROTHROMBIN TIME: CPT

## 2024-10-15 PROCEDURE — 96374 THER/PROPH/DIAG INJ IV PUSH: CPT | Mod: XU

## 2024-10-15 RX ORDER — OXYCODONE HYDROCHLORIDE 5 MG/1
5 TABLET ORAL EVERY 6 HOURS PRN
Status: DISCONTINUED | OUTPATIENT
Start: 2024-10-15 | End: 2024-10-16 | Stop reason: HOSPADM

## 2024-10-15 RX ORDER — ACETAMINOPHEN 325 MG/1
650 TABLET ORAL EVERY 6 HOURS PRN
Status: DISCONTINUED | OUTPATIENT
Start: 2024-10-15 | End: 2024-10-16 | Stop reason: HOSPADM

## 2024-10-15 RX ORDER — OXYCODONE HYDROCHLORIDE 5 MG/1
5 TABLET ORAL EVERY 6 HOURS PRN
COMMUNITY

## 2024-10-15 RX ORDER — ATORVASTATIN CALCIUM 20 MG/1
20 TABLET, FILM COATED ORAL NIGHTLY
Status: DISCONTINUED | OUTPATIENT
Start: 2024-10-15 | End: 2024-10-16 | Stop reason: HOSPADM

## 2024-10-15 RX ORDER — CLOTRIMAZOLE AND BETAMETHASONE DIPROPIONATE 10; .64 MG/G; MG/G
1 CREAM TOPICAL 2 TIMES DAILY
Status: DISCONTINUED | OUTPATIENT
Start: 2024-10-16 | End: 2024-10-16 | Stop reason: HOSPADM

## 2024-10-15 RX ORDER — SULFAMETHOXAZOLE AND TRIMETHOPRIM 800; 160 MG/1; MG/1
1 TABLET ORAL 2 TIMES DAILY
COMMUNITY
Start: 2024-10-07

## 2024-10-15 RX ORDER — LABETALOL HYDROCHLORIDE 5 MG/ML
10 INJECTION, SOLUTION INTRAVENOUS EVERY 4 HOURS PRN
Status: DISCONTINUED | OUTPATIENT
Start: 2024-10-15 | End: 2024-10-15

## 2024-10-15 RX ORDER — INSULIN LISPRO 100 [IU]/ML
2-9 INJECTION, SOLUTION INTRAVENOUS; SUBCUTANEOUS
Status: DISCONTINUED | OUTPATIENT
Start: 2024-10-15 | End: 2024-10-16 | Stop reason: HOSPADM

## 2024-10-15 RX ORDER — DEXTROSE MONOHYDRATE 25 G/50ML
25 INJECTION, SOLUTION INTRAVENOUS
Status: DISCONTINUED | OUTPATIENT
Start: 2024-10-15 | End: 2024-10-16 | Stop reason: HOSPADM

## 2024-10-15 RX ORDER — LANOLIN ALCOHOL/MO/W.PET/CERES
400 CREAM (GRAM) TOPICAL EVERY MORNING
Status: DISCONTINUED | OUTPATIENT
Start: 2024-10-16 | End: 2024-10-16 | Stop reason: HOSPADM

## 2024-10-15 RX ORDER — ALUMINA, MAGNESIA, AND SIMETHICONE 2400; 2400; 240 MG/30ML; MG/30ML; MG/30ML
10 SUSPENSION ORAL 4 TIMES DAILY PRN
Status: DISCONTINUED | OUTPATIENT
Start: 2024-10-15 | End: 2024-10-16 | Stop reason: HOSPADM

## 2024-10-15 RX ORDER — ACETAMINOPHEN 325 MG/1
650 TABLET ORAL EVERY 6 HOURS PRN
COMMUNITY

## 2024-10-15 RX ORDER — FUROSEMIDE 40 MG/1
40 TABLET ORAL DAILY
Status: DISCONTINUED | OUTPATIENT
Start: 2024-10-16 | End: 2024-10-16 | Stop reason: HOSPADM

## 2024-10-15 RX ORDER — PANTOPRAZOLE SODIUM 20 MG/1
20 TABLET, DELAYED RELEASE ORAL EVERY MORNING
Status: DISCONTINUED | OUTPATIENT
Start: 2024-10-16 | End: 2024-10-15

## 2024-10-15 RX ORDER — FUROSEMIDE 10 MG/ML
20 INJECTION INTRAMUSCULAR; INTRAVENOUS ONCE
Status: COMPLETED | OUTPATIENT
Start: 2024-10-15 | End: 2024-10-15

## 2024-10-15 RX ORDER — POTASSIUM CHLORIDE 1500 MG/1
20 TABLET, EXTENDED RELEASE ORAL EVERY MORNING
Status: DISCONTINUED | OUTPATIENT
Start: 2024-10-16 | End: 2024-10-16 | Stop reason: HOSPADM

## 2024-10-15 RX ORDER — GABAPENTIN 400 MG/1
400 CAPSULE ORAL 2 TIMES DAILY
Status: DISCONTINUED | OUTPATIENT
Start: 2024-10-15 | End: 2024-10-16 | Stop reason: HOSPADM

## 2024-10-15 RX ORDER — METOPROLOL TARTRATE 25 MG/1
50 TABLET, FILM COATED ORAL 2 TIMES DAILY
Status: DISCONTINUED | OUTPATIENT
Start: 2024-10-16 | End: 2024-10-16 | Stop reason: HOSPADM

## 2024-10-15 RX ORDER — METOPROLOL TARTRATE 1 MG/ML
5 INJECTION, SOLUTION INTRAVENOUS
Status: DISCONTINUED | OUTPATIENT
Start: 2024-10-15 | End: 2024-10-16 | Stop reason: HOSPADM

## 2024-10-15 RX ORDER — FERROUS SULFATE 325(65) MG
325 TABLET ORAL 2 TIMES DAILY
Status: DISCONTINUED | OUTPATIENT
Start: 2024-10-15 | End: 2024-10-16 | Stop reason: HOSPADM

## 2024-10-15 RX ORDER — ASCORBIC ACID 500 MG
500 TABLET ORAL EVERY MORNING
Status: DISCONTINUED | OUTPATIENT
Start: 2024-10-16 | End: 2024-10-16 | Stop reason: HOSPADM

## 2024-10-15 RX ORDER — LACTULOSE 10 G/15ML
30 SOLUTION ORAL 3 TIMES DAILY
Status: DISCONTINUED | OUTPATIENT
Start: 2024-10-16 | End: 2024-10-16 | Stop reason: HOSPADM

## 2024-10-15 RX ADMIN — ATORVASTATIN CALCIUM 20 MG: 20 TABLET, FILM COATED ORAL at 23:43

## 2024-10-15 RX ADMIN — GABAPENTIN 400 MG: 400 CAPSULE ORAL at 23:43

## 2024-10-15 RX ADMIN — ALUMINUM HYDROXIDE, MAGNESIUM HYDROXIDE, AND DIMETHICONE 10 ML: 400; 400; 40 SUSPENSION ORAL at 23:43

## 2024-10-15 RX ADMIN — IOHEXOL 100 ML: 350 INJECTION, SOLUTION INTRAVENOUS at 13:44

## 2024-10-15 RX ADMIN — FUROSEMIDE 20 MG: 10 INJECTION, SOLUTION INTRAVENOUS at 23:43

## 2024-10-15 RX ADMIN — FERROUS SULFATE TAB 325 MG (65 MG ELEMENTAL FE) 325 MG: 325 (65 FE) TAB at 23:43

## 2024-10-15 RX ADMIN — RIFAXIMIN 550 MG: 550 TABLET ORAL at 23:43

## 2024-10-15 ASSESSMENT — LIFESTYLE VARIABLES
AVERAGE NUMBER OF DAYS PER WEEK YOU HAVE A DRINK CONTAINING ALCOHOL: 0
HAVE PEOPLE ANNOYED YOU BY CRITICIZING YOUR DRINKING: NO
EVER HAD A DRINK FIRST THING IN THE MORNING TO STEADY YOUR NERVES TO GET RID OF A HANGOVER: NO
ALCOHOL_USE: NO
TOTAL SCORE: 0
EVER FELT BAD OR GUILTY ABOUT YOUR DRINKING: NO
CONSUMPTION TOTAL: NEGATIVE
HOW MANY TIMES IN THE PAST YEAR HAVE YOU HAD 5 OR MORE DRINKS IN A DAY: 0
TOTAL SCORE: 0
ON A TYPICAL DAY WHEN YOU DRINK ALCOHOL HOW MANY DRINKS DO YOU HAVE: 0
TOTAL SCORE: 0
SUBSTANCE_ABUSE: 0
HAVE YOU EVER FELT YOU SHOULD CUT DOWN ON YOUR DRINKING: NO

## 2024-10-15 ASSESSMENT — SOCIAL DETERMINANTS OF HEALTH (SDOH)
WITHIN THE LAST YEAR, HAVE YOU BEEN KICKED, HIT, SLAPPED, OR OTHERWISE PHYSICALLY HURT BY YOUR PARTNER OR EX-PARTNER?: NO
WITHIN THE LAST YEAR, HAVE YOU BEEN KICKED, HIT, SLAPPED, OR OTHERWISE PHYSICALLY HURT BY YOUR PARTNER OR EX-PARTNER?: NO
WITHIN THE LAST YEAR, HAVE YOU BEEN HUMILIATED OR EMOTIONALLY ABUSED IN OTHER WAYS BY YOUR PARTNER OR EX-PARTNER?: NO
WITHIN THE LAST YEAR, HAVE TO BEEN RAPED OR FORCED TO HAVE ANY KIND OF SEXUAL ACTIVITY BY YOUR PARTNER OR EX-PARTNER?: NO
WITHIN THE LAST YEAR, HAVE YOU BEEN AFRAID OF YOUR PARTNER OR EX-PARTNER?: NO
WITHIN THE LAST YEAR, HAVE TO BEEN RAPED OR FORCED TO HAVE ANY KIND OF SEXUAL ACTIVITY BY YOUR PARTNER OR EX-PARTNER?: NO
WITHIN THE LAST YEAR, HAVE YOU BEEN AFRAID OF YOUR PARTNER OR EX-PARTNER?: NO
WITHIN THE PAST 12 MONTHS, YOU WORRIED THAT YOUR FOOD WOULD RUN OUT BEFORE YOU GOT THE MONEY TO BUY MORE: NEVER TRUE
IN THE PAST 12 MONTHS, HAS THE ELECTRIC, GAS, OIL, OR WATER COMPANY THREATENED TO SHUT OFF SERVICE IN YOUR HOME?: NO
WITHIN THE PAST 12 MONTHS, THE FOOD YOU BOUGHT JUST DIDN'T LAST AND YOU DIDN'T HAVE MONEY TO GET MORE: NEVER TRUE
WITHIN THE LAST YEAR, HAVE YOU BEEN HUMILIATED OR EMOTIONALLY ABUSED IN OTHER WAYS BY YOUR PARTNER OR EX-PARTNER?: NO

## 2024-10-15 ASSESSMENT — ENCOUNTER SYMPTOMS
SPEECH CHANGE: 0
SPUTUM PRODUCTION: 0
DEPRESSION: 0
ABDOMINAL PAIN: 1
MYALGIAS: 0
EYE DISCHARGE: 0
DIARRHEA: 0
SHORTNESS OF BREATH: 1
PALPITATIONS: 1
BRUISES/BLEEDS EASILY: 0
SORE THROAT: 0
LOSS OF CONSCIOUSNESS: 0
CONSTIPATION: 0
DIZZINESS: 0
WEAKNESS: 0
FOCAL WEAKNESS: 0
HEMOPTYSIS: 0
NAUSEA: 0
CHILLS: 0
EYE PAIN: 0
SENSORY CHANGE: 0
NECK PAIN: 0
HEADACHES: 0
COUGH: 0
WHEEZING: 0
CLAUDICATION: 0
BACK PAIN: 0
FEVER: 0
DIAPHORESIS: 0
VOMITING: 0

## 2024-10-15 ASSESSMENT — FIBROSIS 4 INDEX
FIB4 SCORE: 5.62
FIB4 SCORE: 4.03
FIB4 SCORE: 5.62

## 2024-10-15 ASSESSMENT — PAIN DESCRIPTION - PAIN TYPE
TYPE: CHRONIC PAIN;NEUROPATHIC PAIN
TYPE: CHRONIC PAIN;NEUROPATHIC PAIN
TYPE: ACUTE PAIN

## 2024-10-16 VITALS
SYSTOLIC BLOOD PRESSURE: 141 MMHG | OXYGEN SATURATION: 97 % | DIASTOLIC BLOOD PRESSURE: 80 MMHG | TEMPERATURE: 97.7 F | BODY MASS INDEX: 30.74 KG/M2 | HEART RATE: 104 BPM | RESPIRATION RATE: 18 BRPM | WEIGHT: 202.82 LBS | HEIGHT: 68 IN

## 2024-10-16 PROBLEM — R07.89 OTHER CHEST PAIN: Status: RESOLVED | Noted: 2024-10-15 | Resolved: 2024-10-16

## 2024-10-16 LAB
ALBUMIN SERPL BCP-MCNC: 2.2 G/DL (ref 3.2–4.9)
ALBUMIN/GLOB SERPL: 0.6 G/DL
ALP SERPL-CCNC: 199 U/L (ref 30–99)
ALT SERPL-CCNC: 6 U/L (ref 2–50)
ANION GAP SERPL CALC-SCNC: 8 MMOL/L (ref 7–16)
AST SERPL-CCNC: 20 U/L (ref 12–45)
BASOPHILS # BLD AUTO: 0.9 % (ref 0–1.8)
BASOPHILS # BLD: 0.04 K/UL (ref 0–0.12)
BILIRUB SERPL-MCNC: 0.7 MG/DL (ref 0.1–1.5)
BUN SERPL-MCNC: 16 MG/DL (ref 8–22)
CALCIUM ALBUM COR SERPL-MCNC: 9.4 MG/DL (ref 8.5–10.5)
CALCIUM SERPL-MCNC: 8 MG/DL (ref 8.5–10.5)
CHLORIDE SERPL-SCNC: 107 MMOL/L (ref 96–112)
CO2 SERPL-SCNC: 17 MMOL/L (ref 20–33)
CREAT SERPL-MCNC: 0.83 MG/DL (ref 0.5–1.4)
EOSINOPHIL # BLD AUTO: 0.27 K/UL (ref 0–0.51)
EOSINOPHIL NFR BLD: 6.3 % (ref 0–6.9)
ERYTHROCYTE [DISTWIDTH] IN BLOOD BY AUTOMATED COUNT: 63.8 FL (ref 35.9–50)
FERRITIN SERPL-MCNC: 82.4 NG/ML (ref 10–291)
GFR SERPLBLD CREATININE-BSD FMLA CKD-EPI: 78 ML/MIN/1.73 M 2
GLOBULIN SER CALC-MCNC: 3.9 G/DL (ref 1.9–3.5)
GLUCOSE BLD STRIP.AUTO-MCNC: 91 MG/DL (ref 65–99)
GLUCOSE BLD STRIP.AUTO-MCNC: 93 MG/DL (ref 65–99)
GLUCOSE SERPL-MCNC: 100 MG/DL (ref 65–99)
HCT VFR BLD AUTO: 24.1 % (ref 37–47)
HGB BLD-MCNC: 7.5 G/DL (ref 12–16)
IMM GRANULOCYTES # BLD AUTO: 0.03 K/UL (ref 0–0.11)
IMM GRANULOCYTES NFR BLD AUTO: 0.7 % (ref 0–0.9)
IRON SATN MFR SERPL: 33 % (ref 15–55)
IRON SERPL-MCNC: 60 UG/DL (ref 40–170)
LYMPHOCYTES # BLD AUTO: 1.11 K/UL (ref 1–4.8)
LYMPHOCYTES NFR BLD: 25.8 % (ref 22–41)
MCH RBC QN AUTO: 26 PG (ref 27–33)
MCHC RBC AUTO-ENTMCNC: 31.1 G/DL (ref 32.2–35.5)
MCV RBC AUTO: 83.4 FL (ref 81.4–97.8)
MONOCYTES # BLD AUTO: 0.48 K/UL (ref 0–0.85)
MONOCYTES NFR BLD AUTO: 11.2 % (ref 0–13.4)
NEUTROPHILS # BLD AUTO: 2.37 K/UL (ref 1.82–7.42)
NEUTROPHILS NFR BLD: 55.1 % (ref 44–72)
NRBC # BLD AUTO: 0 K/UL
NRBC BLD-RTO: 0 /100 WBC (ref 0–0.2)
PLATELET # BLD AUTO: 97 K/UL (ref 164–446)
PLATELETS.RETICULATED NFR BLD AUTO: 1.6 % (ref 0.6–13.1)
PMV BLD AUTO: 9.1 FL (ref 9–12.9)
POTASSIUM SERPL-SCNC: 4.8 MMOL/L (ref 3.6–5.5)
PROT SERPL-MCNC: 6.1 G/DL (ref 6–8.2)
RBC # BLD AUTO: 2.89 M/UL (ref 4.2–5.4)
SODIUM SERPL-SCNC: 132 MMOL/L (ref 135–145)
TIBC SERPL-MCNC: 183 UG/DL (ref 250–450)
UIBC SERPL-MCNC: 123 UG/DL (ref 110–370)
VIT B12 SERPL-MCNC: 718 PG/ML (ref 211–911)
WBC # BLD AUTO: 4.3 K/UL (ref 4.8–10.8)

## 2024-10-16 PROCEDURE — 82962 GLUCOSE BLOOD TEST: CPT | Mod: 91

## 2024-10-16 PROCEDURE — 85055 RETICULATED PLATELET ASSAY: CPT

## 2024-10-16 PROCEDURE — 80053 COMPREHEN METABOLIC PANEL: CPT

## 2024-10-16 PROCEDURE — 99239 HOSP IP/OBS DSCHRG MGMT >30: CPT | Mod: GC | Performed by: INTERNAL MEDICINE

## 2024-10-16 PROCEDURE — A9270 NON-COVERED ITEM OR SERVICE: HCPCS | Mod: UD | Performed by: HOSPITALIST

## 2024-10-16 PROCEDURE — 82728 ASSAY OF FERRITIN: CPT

## 2024-10-16 PROCEDURE — 82607 VITAMIN B-12: CPT

## 2024-10-16 PROCEDURE — 83550 IRON BINDING TEST: CPT

## 2024-10-16 PROCEDURE — 700102 HCHG RX REV CODE 250 W/ 637 OVERRIDE(OP): Mod: UD | Performed by: HOSPITALIST

## 2024-10-16 PROCEDURE — 85025 COMPLETE CBC W/AUTO DIFF WBC: CPT

## 2024-10-16 PROCEDURE — G0378 HOSPITAL OBSERVATION PER HR: HCPCS

## 2024-10-16 PROCEDURE — 83540 ASSAY OF IRON: CPT

## 2024-10-16 PROCEDURE — 36415 COLL VENOUS BLD VENIPUNCTURE: CPT

## 2024-10-16 RX ORDER — METOPROLOL SUCCINATE 25 MG/1
25 TABLET, EXTENDED RELEASE ORAL DAILY
Status: SHIPPED
Start: 2024-10-16 | End: 2024-11-15

## 2024-10-16 RX ADMIN — RIFAXIMIN 550 MG: 550 TABLET ORAL at 06:09

## 2024-10-16 RX ADMIN — CLOTRIMAZOLE AND BETAMETHASONE DIPROPIONATE 1 APPLICATION: 10; .5 CREAM TOPICAL at 06:10

## 2024-10-16 RX ADMIN — OXYCODONE HYDROCHLORIDE AND ACETAMINOPHEN 500 MG: 500 TABLET ORAL at 06:09

## 2024-10-16 RX ADMIN — POTASSIUM CHLORIDE 20 MEQ: 1500 TABLET, EXTENDED RELEASE ORAL at 06:09

## 2024-10-16 RX ADMIN — METOPROLOL TARTRATE 50 MG: 25 TABLET, FILM COATED ORAL at 06:10

## 2024-10-16 RX ADMIN — FERROUS SULFATE TAB 325 MG (65 MG ELEMENTAL FE) 325 MG: 325 (65 FE) TAB at 06:09

## 2024-10-16 RX ADMIN — FUROSEMIDE 40 MG: 40 TABLET ORAL at 06:10

## 2024-10-16 RX ADMIN — Medication 400 MG: at 06:09

## 2024-10-16 RX ADMIN — LACTULOSE 30 ML: 10 SOLUTION ORAL at 06:10

## 2024-10-16 RX ADMIN — GABAPENTIN 400 MG: 400 CAPSULE ORAL at 06:09

## 2024-10-16 RX ADMIN — OMEPRAZOLE 20 MG: 20 CAPSULE, DELAYED RELEASE ORAL at 06:09

## 2024-10-16 ASSESSMENT — PAIN DESCRIPTION - PAIN TYPE: TYPE: ACUTE PAIN

## 2024-10-18 ENCOUNTER — APPOINTMENT (OUTPATIENT)
Dept: PHYSICAL THERAPY | Facility: REHABILITATION | Age: 64
End: 2024-10-18
Attending: INTERNAL MEDICINE
Payer: MEDICAID

## 2024-10-18 ENCOUNTER — HOSPITAL ENCOUNTER (EMERGENCY)
Facility: MEDICAL CENTER | Age: 64
End: 2024-10-19
Attending: EMERGENCY MEDICINE
Payer: MEDICAID

## 2024-10-18 ENCOUNTER — APPOINTMENT (OUTPATIENT)
Dept: RADIOLOGY | Facility: MEDICAL CENTER | Age: 64
End: 2024-10-18
Attending: EMERGENCY MEDICINE
Payer: MEDICAID

## 2024-10-18 DIAGNOSIS — R79.89 ELEVATED BRAIN NATRIURETIC PEPTIDE (BNP) LEVEL: ICD-10-CM

## 2024-10-18 DIAGNOSIS — D64.9 CHRONIC ANEMIA: ICD-10-CM

## 2024-10-18 DIAGNOSIS — R06.81 APNEIC EPISODE: ICD-10-CM

## 2024-10-18 LAB — EKG IMPRESSION: NORMAL

## 2024-10-18 PROCEDURE — 83880 ASSAY OF NATRIURETIC PEPTIDE: CPT

## 2024-10-18 PROCEDURE — 99285 EMERGENCY DEPT VISIT HI MDM: CPT

## 2024-10-18 PROCEDURE — 85025 COMPLETE CBC W/AUTO DIFF WBC: CPT

## 2024-10-18 PROCEDURE — 94760 N-INVAS EAR/PLS OXIMETRY 1: CPT

## 2024-10-18 PROCEDURE — 93005 ELECTROCARDIOGRAM TRACING: CPT | Performed by: EMERGENCY MEDICINE

## 2024-10-18 PROCEDURE — 84484 ASSAY OF TROPONIN QUANT: CPT

## 2024-10-18 PROCEDURE — 80053 COMPREHEN METABOLIC PANEL: CPT

## 2024-10-18 PROCEDURE — 36415 COLL VENOUS BLD VENIPUNCTURE: CPT

## 2024-10-18 ASSESSMENT — FIBROSIS 4 INDEX: FIB4 SCORE: 5.39

## 2024-10-19 VITALS
DIASTOLIC BLOOD PRESSURE: 74 MMHG | BODY MASS INDEX: 30.01 KG/M2 | HEIGHT: 68 IN | OXYGEN SATURATION: 94 % | SYSTOLIC BLOOD PRESSURE: 122 MMHG | HEART RATE: 96 BPM | WEIGHT: 198 LBS | RESPIRATION RATE: 18 BRPM | TEMPERATURE: 98 F

## 2024-10-19 LAB
ALBUMIN SERPL BCP-MCNC: 2.9 G/DL (ref 3.2–4.9)
ALBUMIN/GLOB SERPL: 0.7 G/DL
ALP SERPL-CCNC: 219 U/L (ref 30–99)
ALT SERPL-CCNC: 12 U/L (ref 2–50)
ANION GAP SERPL CALC-SCNC: 10 MMOL/L (ref 7–16)
APPEARANCE UR: CLEAR
AST SERPL-CCNC: 23 U/L (ref 12–45)
BACTERIA #/AREA URNS HPF: NEGATIVE /HPF
BASOPHILS # BLD AUTO: 0.8 % (ref 0–1.8)
BASOPHILS # BLD: 0.04 K/UL (ref 0–0.12)
BILIRUB SERPL-MCNC: 0.7 MG/DL (ref 0.1–1.5)
BILIRUB UR QL STRIP.AUTO: NEGATIVE
BUN SERPL-MCNC: 19 MG/DL (ref 8–22)
CALCIUM ALBUM COR SERPL-MCNC: 9.3 MG/DL (ref 8.5–10.5)
CALCIUM SERPL-MCNC: 8.4 MG/DL (ref 8.5–10.5)
CHLORIDE SERPL-SCNC: 104 MMOL/L (ref 96–112)
CO2 SERPL-SCNC: 18 MMOL/L (ref 20–33)
COLOR UR: YELLOW
CREAT SERPL-MCNC: 0.78 MG/DL (ref 0.5–1.4)
EOSINOPHIL # BLD AUTO: 0.1 K/UL (ref 0–0.51)
EOSINOPHIL NFR BLD: 2.1 % (ref 0–6.9)
EPI CELLS #/AREA URNS HPF: ABNORMAL /HPF
ERYTHROCYTE [DISTWIDTH] IN BLOOD BY AUTOMATED COUNT: 69.2 FL (ref 35.9–50)
GFR SERPLBLD CREATININE-BSD FMLA CKD-EPI: 84 ML/MIN/1.73 M 2
GLOBULIN SER CALC-MCNC: 4.2 G/DL (ref 1.9–3.5)
GLUCOSE SERPL-MCNC: 132 MG/DL (ref 65–99)
GLUCOSE UR STRIP.AUTO-MCNC: NEGATIVE MG/DL
HCT VFR BLD AUTO: 25.1 % (ref 37–47)
HGB BLD-MCNC: 7.9 G/DL (ref 12–16)
HYALINE CASTS #/AREA URNS LPF: ABNORMAL /LPF
IMM GRANULOCYTES # BLD AUTO: 0.02 K/UL (ref 0–0.11)
IMM GRANULOCYTES NFR BLD AUTO: 0.4 % (ref 0–0.9)
KETONES UR STRIP.AUTO-MCNC: NEGATIVE MG/DL
LEUKOCYTE ESTERASE UR QL STRIP.AUTO: ABNORMAL
LYMPHOCYTES # BLD AUTO: 0.89 K/UL (ref 1–4.8)
LYMPHOCYTES NFR BLD: 18.9 % (ref 22–41)
MCH RBC QN AUTO: 27.3 PG (ref 27–33)
MCHC RBC AUTO-ENTMCNC: 31.5 G/DL (ref 32.2–35.5)
MCV RBC AUTO: 86.9 FL (ref 81.4–97.8)
MICRO URNS: ABNORMAL
MONOCYTES # BLD AUTO: 0.46 K/UL (ref 0–0.85)
MONOCYTES NFR BLD AUTO: 9.8 % (ref 0–13.4)
NEUTROPHILS # BLD AUTO: 3.2 K/UL (ref 1.82–7.42)
NEUTROPHILS NFR BLD: 68 % (ref 44–72)
NITRITE UR QL STRIP.AUTO: NEGATIVE
NRBC # BLD AUTO: 0 K/UL
NRBC BLD-RTO: 0 /100 WBC (ref 0–0.2)
NT-PROBNP SERPL IA-MCNC: 1545 PG/ML (ref 0–125)
PH UR STRIP.AUTO: 6.5 [PH] (ref 5–8)
PLATELET # BLD AUTO: 109 K/UL (ref 164–446)
PMV BLD AUTO: 10.6 FL (ref 9–12.9)
POTASSIUM SERPL-SCNC: 5.6 MMOL/L (ref 3.6–5.5)
PROT SERPL-MCNC: 7.1 G/DL (ref 6–8.2)
PROT UR QL STRIP: NEGATIVE MG/DL
RBC # BLD AUTO: 2.89 M/UL (ref 4.2–5.4)
RBC # URNS HPF: ABNORMAL /HPF
RBC UR QL AUTO: ABNORMAL
SODIUM SERPL-SCNC: 132 MMOL/L (ref 135–145)
SP GR UR STRIP.AUTO: 1.01
TROPONIN T SERPL-MCNC: 15 NG/L (ref 6–19)
UROBILINOGEN UR STRIP.AUTO-MCNC: 1 MG/DL
WBC # BLD AUTO: 4.7 K/UL (ref 4.8–10.8)
WBC #/AREA URNS HPF: ABNORMAL /HPF

## 2024-10-19 PROCEDURE — 81001 URINALYSIS AUTO W/SCOPE: CPT

## 2024-10-19 PROCEDURE — 700111 HCHG RX REV CODE 636 W/ 250 OVERRIDE (IP): Mod: UD | Performed by: EMERGENCY MEDICINE

## 2024-10-19 PROCEDURE — 71045 X-RAY EXAM CHEST 1 VIEW: CPT

## 2024-10-19 PROCEDURE — 96374 THER/PROPH/DIAG INJ IV PUSH: CPT

## 2024-10-19 RX ORDER — FUROSEMIDE 10 MG/ML
40 INJECTION INTRAMUSCULAR; INTRAVENOUS ONCE
Status: COMPLETED | OUTPATIENT
Start: 2024-10-19 | End: 2024-10-19

## 2024-10-19 RX ADMIN — FUROSEMIDE 40 MG: 10 INJECTION INTRAMUSCULAR; INTRAVENOUS at 01:05

## 2024-10-22 ENCOUNTER — APPOINTMENT (OUTPATIENT)
Dept: PHYSICAL THERAPY | Facility: REHABILITATION | Age: 64
End: 2024-10-22
Attending: INTERNAL MEDICINE
Payer: MEDICAID

## 2024-10-25 ENCOUNTER — APPOINTMENT (OUTPATIENT)
Dept: PHYSICAL THERAPY | Facility: REHABILITATION | Age: 64
End: 2024-10-25
Attending: INTERNAL MEDICINE
Payer: MEDICAID

## 2024-10-29 ENCOUNTER — APPOINTMENT (OUTPATIENT)
Dept: PHYSICAL THERAPY | Facility: REHABILITATION | Age: 64
End: 2024-10-29
Attending: INTERNAL MEDICINE
Payer: MEDICAID

## 2024-11-01 ENCOUNTER — APPOINTMENT (OUTPATIENT)
Dept: PHYSICAL THERAPY | Facility: REHABILITATION | Age: 64
End: 2024-11-01
Attending: INTERNAL MEDICINE
Payer: MEDICAID

## 2024-11-05 ENCOUNTER — APPOINTMENT (OUTPATIENT)
Dept: PHYSICAL THERAPY | Facility: REHABILITATION | Age: 64
End: 2024-11-05
Attending: INTERNAL MEDICINE
Payer: MEDICAID

## 2024-11-20 ENCOUNTER — HOSPITAL ENCOUNTER (OUTPATIENT)
Facility: MEDICAL CENTER | Age: 64
End: 2024-11-20
Attending: NURSE PRACTITIONER
Payer: MEDICAID

## 2024-11-20 PROCEDURE — 87077 CULTURE AEROBIC IDENTIFY: CPT | Mod: 91

## 2024-11-20 PROCEDURE — 87086 URINE CULTURE/COLONY COUNT: CPT

## 2024-11-20 PROCEDURE — 87186 SC STD MICRODIL/AGAR DIL: CPT

## 2024-11-23 ENCOUNTER — HOSPITAL ENCOUNTER (INPATIENT)
Facility: MEDICAL CENTER | Age: 64
LOS: 3 days | End: 2024-11-28
Attending: STUDENT IN AN ORGANIZED HEALTH CARE EDUCATION/TRAINING PROGRAM | Admitting: INTERNAL MEDICINE
Payer: MEDICAID

## 2024-11-23 ENCOUNTER — APPOINTMENT (OUTPATIENT)
Dept: RADIOLOGY | Facility: MEDICAL CENTER | Age: 64
End: 2024-11-23
Attending: STUDENT IN AN ORGANIZED HEALTH CARE EDUCATION/TRAINING PROGRAM
Payer: MEDICAID

## 2024-11-23 DIAGNOSIS — R10.84 GENERALIZED ABDOMINAL PAIN: ICD-10-CM

## 2024-11-23 DIAGNOSIS — K70.31 ALCOHOLIC CIRRHOSIS OF LIVER WITH ASCITES (HCC): ICD-10-CM

## 2024-11-23 DIAGNOSIS — M51.360 DEGENERATION OF INTERVERTEBRAL DISC OF LUMBAR REGION WITH DISCOGENIC BACK PAIN: ICD-10-CM

## 2024-11-23 DIAGNOSIS — K76.82 HEPATIC ENCEPHALOPATHY (HCC): ICD-10-CM

## 2024-11-23 DIAGNOSIS — K76.6 PORTAL HYPERTENSION (HCC): ICD-10-CM

## 2024-11-23 DIAGNOSIS — K65.2 SBP (SPONTANEOUS BACTERIAL PERITONITIS) (HCC): ICD-10-CM

## 2024-11-23 DIAGNOSIS — I48.91 ATRIAL FIBRILLATION WITH RVR (HCC): ICD-10-CM

## 2024-11-23 PROBLEM — K92.1 BLOODY STOOL: Status: ACTIVE | Noted: 2024-11-23

## 2024-11-23 LAB
ALBUMIN SERPL BCP-MCNC: 2.8 G/DL (ref 3.2–4.9)
ALBUMIN/GLOB SERPL: 0.7 G/DL
ALP SERPL-CCNC: 272 U/L (ref 30–99)
ALT SERPL-CCNC: 9 U/L (ref 2–50)
AMMONIA PLAS-SCNC: 31 UMOL/L (ref 11–45)
ANION GAP SERPL CALC-SCNC: 8 MMOL/L (ref 7–16)
ANISOCYTOSIS BLD QL SMEAR: ABNORMAL
AST SERPL-CCNC: 21 U/L (ref 12–45)
BASOPHILS # BLD AUTO: 1 % (ref 0–1.8)
BASOPHILS # BLD: 0.04 K/UL (ref 0–0.12)
BILIRUB SERPL-MCNC: 0.9 MG/DL (ref 0.1–1.5)
BUN SERPL-MCNC: 23 MG/DL (ref 8–22)
CALCIUM ALBUM COR SERPL-MCNC: 8.9 MG/DL (ref 8.5–10.5)
CALCIUM SERPL-MCNC: 7.9 MG/DL (ref 8.5–10.5)
CHLORIDE SERPL-SCNC: 110 MMOL/L (ref 96–112)
CO2 SERPL-SCNC: 19 MMOL/L (ref 20–33)
COMMENT 1642: NORMAL
CREAT SERPL-MCNC: 0.67 MG/DL (ref 0.5–1.4)
EOSINOPHIL # BLD AUTO: 0.23 K/UL (ref 0–0.51)
EOSINOPHIL NFR BLD: 5.6 % (ref 0–6.9)
ERYTHROCYTE [DISTWIDTH] IN BLOOD BY AUTOMATED COUNT: 67.7 FL (ref 35.9–50)
GFR SERPLBLD CREATININE-BSD FMLA CKD-EPI: 97 ML/MIN/1.73 M 2
GLOBULIN SER CALC-MCNC: 4.2 G/DL (ref 1.9–3.5)
GLUCOSE SERPL-MCNC: 115 MG/DL (ref 65–99)
HCT VFR BLD AUTO: 26.9 % (ref 37–47)
HGB BLD-MCNC: 8.5 G/DL (ref 12–16)
HYPOCHROMIA BLD QL SMEAR: ABNORMAL
IMM GRANULOCYTES # BLD AUTO: 0.02 K/UL (ref 0–0.11)
IMM GRANULOCYTES NFR BLD AUTO: 0.5 % (ref 0–0.9)
INR PPP: 1.46 (ref 0.87–1.13)
LACTATE SERPL-SCNC: 1 MMOL/L (ref 0.5–2)
LIPASE SERPL-CCNC: 29 U/L (ref 11–82)
LYMPHOCYTES # BLD AUTO: 0.87 K/UL (ref 1–4.8)
LYMPHOCYTES NFR BLD: 21.1 % (ref 22–41)
MAGNESIUM SERPL-MCNC: 1.9 MG/DL (ref 1.5–2.5)
MCH RBC QN AUTO: 29.1 PG (ref 27–33)
MCHC RBC AUTO-ENTMCNC: 31.6 G/DL (ref 32.2–35.5)
MCV RBC AUTO: 92.1 FL (ref 81.4–97.8)
MONOCYTES # BLD AUTO: 0.42 K/UL (ref 0–0.85)
MONOCYTES NFR BLD AUTO: 10.2 % (ref 0–13.4)
MORPHOLOGY BLD-IMP: NORMAL
NEUTROPHILS # BLD AUTO: 2.54 K/UL (ref 1.82–7.42)
NEUTROPHILS NFR BLD: 61.6 % (ref 44–72)
NRBC # BLD AUTO: 0 K/UL
NRBC BLD-RTO: 0 /100 WBC (ref 0–0.2)
OVALOCYTES BLD QL SMEAR: NORMAL
PHOSPHATE SERPL-MCNC: 3.7 MG/DL (ref 2.5–4.5)
PLATELET # BLD AUTO: 82 K/UL (ref 164–446)
PLATELET BLD QL SMEAR: NORMAL
PLATELETS.RETICULATED NFR BLD AUTO: 1.1 % (ref 0.6–13.1)
PMV BLD AUTO: 10.4 FL (ref 9–12.9)
POIKILOCYTOSIS BLD QL SMEAR: NORMAL
POTASSIUM SERPL-SCNC: 4.5 MMOL/L (ref 3.6–5.5)
PROT SERPL-MCNC: 7 G/DL (ref 6–8.2)
PROTHROMBIN TIME: 17.8 SEC (ref 12–14.6)
RBC # BLD AUTO: 2.92 M/UL (ref 4.2–5.4)
RBC BLD AUTO: PRESENT
SODIUM SERPL-SCNC: 137 MMOL/L (ref 135–145)
WBC # BLD AUTO: 4.1 K/UL (ref 4.8–10.8)

## 2024-11-23 PROCEDURE — 85055 RETICULATED PLATELET ASSAY: CPT

## 2024-11-23 PROCEDURE — 700102 HCHG RX REV CODE 250 W/ 637 OVERRIDE(OP): Performed by: STUDENT IN AN ORGANIZED HEALTH CARE EDUCATION/TRAINING PROGRAM

## 2024-11-23 PROCEDURE — A9270 NON-COVERED ITEM OR SERVICE: HCPCS | Performed by: STUDENT IN AN ORGANIZED HEALTH CARE EDUCATION/TRAINING PROGRAM

## 2024-11-23 PROCEDURE — 770020 HCHG ROOM/CARE - TELE (206)

## 2024-11-23 PROCEDURE — 85025 COMPLETE CBC W/AUTO DIFF WBC: CPT

## 2024-11-23 PROCEDURE — 36415 COLL VENOUS BLD VENIPUNCTURE: CPT

## 2024-11-23 PROCEDURE — 93005 ELECTROCARDIOGRAM TRACING: CPT | Performed by: STUDENT IN AN ORGANIZED HEALTH CARE EDUCATION/TRAINING PROGRAM

## 2024-11-23 PROCEDURE — 83735 ASSAY OF MAGNESIUM: CPT

## 2024-11-23 PROCEDURE — 700101 HCHG RX REV CODE 250: Mod: UD | Performed by: STUDENT IN AN ORGANIZED HEALTH CARE EDUCATION/TRAINING PROGRAM

## 2024-11-23 PROCEDURE — 80053 COMPREHEN METABOLIC PANEL: CPT

## 2024-11-23 PROCEDURE — 84100 ASSAY OF PHOSPHORUS: CPT

## 2024-11-23 PROCEDURE — 85610 PROTHROMBIN TIME: CPT

## 2024-11-23 PROCEDURE — 76705 ECHO EXAM OF ABDOMEN: CPT

## 2024-11-23 PROCEDURE — 99285 EMERGENCY DEPT VISIT HI MDM: CPT

## 2024-11-23 PROCEDURE — 96375 TX/PRO/DX INJ NEW DRUG ADDON: CPT

## 2024-11-23 PROCEDURE — 96374 THER/PROPH/DIAG INJ IV PUSH: CPT

## 2024-11-23 PROCEDURE — 700111 HCHG RX REV CODE 636 W/ 250 OVERRIDE (IP): Mod: JZ,UD | Performed by: STUDENT IN AN ORGANIZED HEALTH CARE EDUCATION/TRAINING PROGRAM

## 2024-11-23 PROCEDURE — 99223 1ST HOSP IP/OBS HIGH 75: CPT | Performed by: STUDENT IN AN ORGANIZED HEALTH CARE EDUCATION/TRAINING PROGRAM

## 2024-11-23 PROCEDURE — 87040 BLOOD CULTURE FOR BACTERIA: CPT

## 2024-11-23 PROCEDURE — 83605 ASSAY OF LACTIC ACID: CPT

## 2024-11-23 PROCEDURE — 83690 ASSAY OF LIPASE: CPT

## 2024-11-23 PROCEDURE — 82140 ASSAY OF AMMONIA: CPT

## 2024-11-23 RX ORDER — DEXTROSE/DEXTRIN/MALTOSE 24 G/31 G
1 GEL (GRAM) ORAL SEE ADMIN INSTRUCTIONS
COMMUNITY

## 2024-11-23 RX ORDER — ENOXAPARIN SODIUM 100 MG/ML
40 INJECTION SUBCUTANEOUS DAILY
Status: DISCONTINUED | OUTPATIENT
Start: 2024-11-24 | End: 2024-11-23

## 2024-11-23 RX ORDER — GABAPENTIN 400 MG/1
400 CAPSULE ORAL 2 TIMES DAILY
Status: DISCONTINUED | OUTPATIENT
Start: 2024-11-23 | End: 2024-11-28 | Stop reason: HOSPADM

## 2024-11-23 RX ORDER — ONDANSETRON 4 MG/1
4 TABLET, ORALLY DISINTEGRATING ORAL EVERY 4 HOURS PRN
Status: DISCONTINUED | OUTPATIENT
Start: 2024-11-23 | End: 2024-11-23

## 2024-11-23 RX ORDER — METOPROLOL TARTRATE 1 MG/ML
5 INJECTION, SOLUTION INTRAVENOUS ONCE
Status: COMPLETED | OUTPATIENT
Start: 2024-11-23 | End: 2024-11-23

## 2024-11-23 RX ORDER — LACTULOSE 10 G/15ML
30 SOLUTION ORAL 3 TIMES DAILY
Status: DISCONTINUED | OUTPATIENT
Start: 2024-11-24 | End: 2024-11-28

## 2024-11-23 RX ORDER — PROCHLORPERAZINE EDISYLATE 5 MG/ML
5-10 INJECTION INTRAMUSCULAR; INTRAVENOUS EVERY 4 HOURS PRN
Status: DISCONTINUED | OUTPATIENT
Start: 2024-11-23 | End: 2024-11-23

## 2024-11-23 RX ORDER — POLYETHYLENE GLYCOL 3350 17 G/17G
1 POWDER, FOR SOLUTION ORAL
Status: DISCONTINUED | OUTPATIENT
Start: 2024-11-23 | End: 2024-11-25

## 2024-11-23 RX ORDER — METOPROLOL SUCCINATE 25 MG/1
25 TABLET, EXTENDED RELEASE ORAL DAILY
Status: ON HOLD | COMMUNITY
End: 2024-11-28

## 2024-11-23 RX ORDER — METOPROLOL TARTRATE 1 MG/ML
5 INJECTION, SOLUTION INTRAVENOUS
Status: DISCONTINUED | OUTPATIENT
Start: 2024-11-23 | End: 2024-11-28 | Stop reason: HOSPADM

## 2024-11-23 RX ORDER — METOPROLOL TARTRATE 25 MG/1
25 TABLET, FILM COATED ORAL 2 TIMES DAILY
Status: DISCONTINUED | OUTPATIENT
Start: 2024-11-23 | End: 2024-11-25

## 2024-11-23 RX ORDER — LABETALOL HYDROCHLORIDE 5 MG/ML
10 INJECTION, SOLUTION INTRAVENOUS EVERY 4 HOURS PRN
Status: DISCONTINUED | OUTPATIENT
Start: 2024-11-23 | End: 2024-11-28 | Stop reason: HOSPADM

## 2024-11-23 RX ORDER — ACETAMINOPHEN 325 MG/1
650 TABLET ORAL EVERY 6 HOURS PRN
Status: DISCONTINUED | OUTPATIENT
Start: 2024-11-23 | End: 2024-11-28 | Stop reason: HOSPADM

## 2024-11-23 RX ORDER — FUROSEMIDE 40 MG/1
40 TABLET ORAL DAILY
Status: DISCONTINUED | OUTPATIENT
Start: 2024-11-24 | End: 2024-11-28 | Stop reason: HOSPADM

## 2024-11-23 RX ORDER — OXYCODONE HYDROCHLORIDE 10 MG/1
10 TABLET ORAL EVERY 6 HOURS PRN
Status: DISCONTINUED | OUTPATIENT
Start: 2024-11-23 | End: 2024-11-24

## 2024-11-23 RX ORDER — SPIRONOLACTONE 50 MG/1
50 TABLET, FILM COATED ORAL DAILY
COMMUNITY

## 2024-11-23 RX ORDER — BISACODYL 10 MG
10 SUPPOSITORY, RECTAL RECTAL
COMMUNITY

## 2024-11-23 RX ORDER — ATORVASTATIN CALCIUM 20 MG/1
20 TABLET, FILM COATED ORAL NIGHTLY
Status: DISCONTINUED | OUTPATIENT
Start: 2024-11-23 | End: 2024-11-28 | Stop reason: HOSPADM

## 2024-11-23 RX ORDER — SPIRONOLACTONE 25 MG/1
50 TABLET ORAL DAILY
Status: DISCONTINUED | OUTPATIENT
Start: 2024-11-24 | End: 2024-11-28 | Stop reason: HOSPADM

## 2024-11-23 RX ORDER — ALBUTEROL SULFATE 90 UG/1
2 INHALANT RESPIRATORY (INHALATION) EVERY 4 HOURS PRN
Status: DISCONTINUED | OUTPATIENT
Start: 2024-11-23 | End: 2024-11-23

## 2024-11-23 RX ORDER — AMOXICILLIN 250 MG
2 CAPSULE ORAL EVERY EVENING
Status: DISCONTINUED | OUTPATIENT
Start: 2024-11-23 | End: 2024-11-25

## 2024-11-23 RX ORDER — OXYCODONE HYDROCHLORIDE 10 MG/1
10 TABLET ORAL EVERY 6 HOURS PRN
Status: ON HOLD | COMMUNITY
End: 2024-11-28

## 2024-11-23 RX ORDER — IPRATROPIUM BROMIDE AND ALBUTEROL SULFATE 2.5; .5 MG/3ML; MG/3ML
3 SOLUTION RESPIRATORY (INHALATION)
Status: DISCONTINUED | OUTPATIENT
Start: 2024-11-23 | End: 2024-11-28 | Stop reason: HOSPADM

## 2024-11-23 RX ORDER — SODIUM PHOSPHATE,MONO-DIBASIC 19G-7G/118
1 ENEMA (ML) RECTAL PRN
Status: ON HOLD | COMMUNITY
End: 2024-11-28

## 2024-11-23 RX ORDER — MORPHINE SULFATE 4 MG/ML
2 INJECTION INTRAVENOUS ONCE
Status: COMPLETED | OUTPATIENT
Start: 2024-11-23 | End: 2024-11-23

## 2024-11-23 RX ORDER — NITAZOXANIDE 500 MG/1
500 TABLET ORAL 2 TIMES DAILY
Status: ON HOLD | COMMUNITY
End: 2024-11-28

## 2024-11-23 RX ORDER — CEFTRIAXONE 2 G/1
2000 INJECTION, POWDER, FOR SOLUTION INTRAMUSCULAR; INTRAVENOUS ONCE
Status: COMPLETED | OUTPATIENT
Start: 2024-11-23 | End: 2024-11-23

## 2024-11-23 RX ORDER — PROMETHAZINE HYDROCHLORIDE 25 MG/1
12.5-25 TABLET ORAL EVERY 4 HOURS PRN
Status: DISCONTINUED | OUTPATIENT
Start: 2024-11-23 | End: 2024-11-28 | Stop reason: HOSPADM

## 2024-11-23 RX ORDER — PROMETHAZINE HYDROCHLORIDE 25 MG/1
12.5-25 SUPPOSITORY RECTAL EVERY 4 HOURS PRN
Status: DISCONTINUED | OUTPATIENT
Start: 2024-11-23 | End: 2024-11-28 | Stop reason: HOSPADM

## 2024-11-23 RX ORDER — ONDANSETRON 2 MG/ML
4 INJECTION INTRAMUSCULAR; INTRAVENOUS EVERY 4 HOURS PRN
Status: DISCONTINUED | OUTPATIENT
Start: 2024-11-23 | End: 2024-11-23

## 2024-11-23 RX ADMIN — OXYCODONE HYDROCHLORIDE 10 MG: 10 TABLET ORAL at 23:19

## 2024-11-23 RX ADMIN — METOPROLOL TARTRATE 5 MG: 5 INJECTION INTRAVENOUS at 20:02

## 2024-11-23 RX ADMIN — ACETAMINOPHEN 650 MG: 325 TABLET ORAL at 23:17

## 2024-11-23 RX ADMIN — SENNOSIDES AND DOCUSATE SODIUM 2 TABLET: 50; 8.6 TABLET ORAL at 23:23

## 2024-11-23 RX ADMIN — ATORVASTATIN CALCIUM 20 MG: 20 TABLET, FILM COATED ORAL at 23:17

## 2024-11-23 RX ADMIN — GABAPENTIN 400 MG: 400 CAPSULE ORAL at 23:17

## 2024-11-23 RX ADMIN — CEFTRIAXONE SODIUM 2000 MG: 2 INJECTION, POWDER, FOR SOLUTION INTRAMUSCULAR; INTRAVENOUS at 23:36

## 2024-11-23 RX ADMIN — METOPROLOL TARTRATE 25 MG: 25 TABLET, FILM COATED ORAL at 21:45

## 2024-11-23 RX ADMIN — MORPHINE SULFATE 2 MG: 4 INJECTION, SOLUTION INTRAMUSCULAR; INTRAVENOUS at 20:40

## 2024-11-23 ASSESSMENT — PAIN DESCRIPTION - PAIN TYPE
TYPE: ACUTE PAIN

## 2024-11-23 ASSESSMENT — FIBROSIS 4 INDEX
FIB4 SCORE: 5.46
FIB4 SCORE: 4.89
FIB4 SCORE: 3.9

## 2024-11-24 ENCOUNTER — APPOINTMENT (OUTPATIENT)
Dept: RADIOLOGY | Facility: MEDICAL CENTER | Age: 64
End: 2024-11-24
Attending: STUDENT IN AN ORGANIZED HEALTH CARE EDUCATION/TRAINING PROGRAM
Payer: MEDICAID

## 2024-11-24 PROBLEM — Z71.89 ADVANCE CARE PLANNING: Status: ACTIVE | Noted: 2024-11-24

## 2024-11-24 LAB
ALBUMIN SERPL BCP-MCNC: 2.5 G/DL (ref 3.2–4.9)
ALBUMIN/GLOB SERPL: 0.6 G/DL
ALP SERPL-CCNC: 250 U/L (ref 30–99)
ALT SERPL-CCNC: 11 U/L (ref 2–50)
ANION GAP SERPL CALC-SCNC: 10 MMOL/L (ref 7–16)
APPEARANCE UR: CLEAR
AST SERPL-CCNC: 20 U/L (ref 12–45)
BACTERIA #/AREA URNS HPF: ABNORMAL /HPF
BACTERIA UR CULT: ABNORMAL
BASOPHILS # BLD AUTO: 1 % (ref 0–1.8)
BASOPHILS # BLD: 0.04 K/UL (ref 0–0.12)
BILIRUB SERPL-MCNC: 0.8 MG/DL (ref 0.1–1.5)
BILIRUB UR QL STRIP.AUTO: NEGATIVE
BUN SERPL-MCNC: 23 MG/DL (ref 8–22)
CALCIUM ALBUM COR SERPL-MCNC: 8.9 MG/DL (ref 8.5–10.5)
CALCIUM SERPL-MCNC: 7.7 MG/DL (ref 8.5–10.5)
CASTS URNS QL MICRO: ABNORMAL /LPF (ref 0–2)
CHLORIDE SERPL-SCNC: 111 MMOL/L (ref 96–112)
CO2 SERPL-SCNC: 17 MMOL/L (ref 20–33)
COLOR UR: YELLOW
CREAT SERPL-MCNC: 0.65 MG/DL (ref 0.5–1.4)
EKG IMPRESSION: NORMAL
EOSINOPHIL # BLD AUTO: 0.3 K/UL (ref 0–0.51)
EOSINOPHIL NFR BLD: 7.1 % (ref 0–6.9)
EPITHELIAL CELLS 1715: ABNORMAL /HPF (ref 0–5)
ERYTHROCYTE [DISTWIDTH] IN BLOOD BY AUTOMATED COUNT: 68.1 FL (ref 35.9–50)
GFR SERPLBLD CREATININE-BSD FMLA CKD-EPI: 98 ML/MIN/1.73 M 2
GLOBULIN SER CALC-MCNC: 4.3 G/DL (ref 1.9–3.5)
GLUCOSE SERPL-MCNC: 103 MG/DL (ref 65–99)
GLUCOSE UR STRIP.AUTO-MCNC: NEGATIVE MG/DL
HCT VFR BLD AUTO: 27 % (ref 37–47)
HGB BLD-MCNC: 8.6 G/DL (ref 12–16)
IMM GRANULOCYTES # BLD AUTO: 0.02 K/UL (ref 0–0.11)
IMM GRANULOCYTES NFR BLD AUTO: 0.5 % (ref 0–0.9)
KETONES UR STRIP.AUTO-MCNC: NEGATIVE MG/DL
LEUKOCYTE ESTERASE UR QL STRIP.AUTO: ABNORMAL
LYMPHOCYTES # BLD AUTO: 0.97 K/UL (ref 1–4.8)
LYMPHOCYTES NFR BLD: 23.1 % (ref 22–41)
MCH RBC QN AUTO: 29.3 PG (ref 27–33)
MCHC RBC AUTO-ENTMCNC: 31.9 G/DL (ref 32.2–35.5)
MCV RBC AUTO: 91.8 FL (ref 81.4–97.8)
MICRO URNS: ABNORMAL
MONOCYTES # BLD AUTO: 0.5 K/UL (ref 0–0.85)
MONOCYTES NFR BLD AUTO: 11.9 % (ref 0–13.4)
NEUTROPHILS # BLD AUTO: 2.37 K/UL (ref 1.82–7.42)
NEUTROPHILS NFR BLD: 56.4 % (ref 44–72)
NITRITE UR QL STRIP.AUTO: NEGATIVE
NRBC # BLD AUTO: 0 K/UL
NRBC BLD-RTO: 0 /100 WBC (ref 0–0.2)
PH UR STRIP.AUTO: 6.5 [PH] (ref 5–8)
PLATELET # BLD AUTO: 79 K/UL (ref 164–446)
PLATELETS.RETICULATED NFR BLD AUTO: 1.1 % (ref 0.6–13.1)
PMV BLD AUTO: 9.4 FL (ref 9–12.9)
POTASSIUM SERPL-SCNC: 4.6 MMOL/L (ref 3.6–5.5)
PROT SERPL-MCNC: 6.8 G/DL (ref 6–8.2)
PROT UR QL STRIP: NEGATIVE MG/DL
RBC # BLD AUTO: 2.94 M/UL (ref 4.2–5.4)
RBC # URNS HPF: >100 /HPF (ref 0–2)
RBC UR QL AUTO: ABNORMAL
SIGNIFICANT IND 70042: ABNORMAL
SITE SITE: ABNORMAL
SODIUM SERPL-SCNC: 138 MMOL/L (ref 135–145)
SOURCE SOURCE: ABNORMAL
SP GR UR STRIP.AUTO: 1.01
UROBILINOGEN UR STRIP.AUTO-MCNC: 0.2 EU/DL
WBC # BLD AUTO: 4.2 K/UL (ref 4.8–10.8)
WBC #/AREA URNS HPF: ABNORMAL /HPF

## 2024-11-24 PROCEDURE — 700102 HCHG RX REV CODE 250 W/ 637 OVERRIDE(OP): Mod: UD | Performed by: STUDENT IN AN ORGANIZED HEALTH CARE EDUCATION/TRAINING PROGRAM

## 2024-11-24 PROCEDURE — G0378 HOSPITAL OBSERVATION PER HR: HCPCS

## 2024-11-24 PROCEDURE — 85055 RETICULATED PLATELET ASSAY: CPT

## 2024-11-24 PROCEDURE — 76705 ECHO EXAM OF ABDOMEN: CPT

## 2024-11-24 PROCEDURE — 700111 HCHG RX REV CODE 636 W/ 250 OVERRIDE (IP): Mod: UD | Performed by: STUDENT IN AN ORGANIZED HEALTH CARE EDUCATION/TRAINING PROGRAM

## 2024-11-24 PROCEDURE — 700102 HCHG RX REV CODE 250 W/ 637 OVERRIDE(OP): Mod: UD

## 2024-11-24 PROCEDURE — 99497 ADVNCD CARE PLAN 30 MIN: CPT | Performed by: STUDENT IN AN ORGANIZED HEALTH CARE EDUCATION/TRAINING PROGRAM

## 2024-11-24 PROCEDURE — 36415 COLL VENOUS BLD VENIPUNCTURE: CPT

## 2024-11-24 PROCEDURE — 74176 CT ABD & PELVIS W/O CONTRAST: CPT

## 2024-11-24 PROCEDURE — 99233 SBSQ HOSP IP/OBS HIGH 50: CPT | Mod: 25 | Performed by: STUDENT IN AN ORGANIZED HEALTH CARE EDUCATION/TRAINING PROGRAM

## 2024-11-24 PROCEDURE — 700111 HCHG RX REV CODE 636 W/ 250 OVERRIDE (IP): Mod: JZ,UD

## 2024-11-24 PROCEDURE — 700105 HCHG RX REV CODE 258: Mod: UD | Performed by: STUDENT IN AN ORGANIZED HEALTH CARE EDUCATION/TRAINING PROGRAM

## 2024-11-24 PROCEDURE — 81001 URINALYSIS AUTO W/SCOPE: CPT

## 2024-11-24 PROCEDURE — A9270 NON-COVERED ITEM OR SERVICE: HCPCS | Mod: UD

## 2024-11-24 PROCEDURE — 80053 COMPREHEN METABOLIC PANEL: CPT

## 2024-11-24 PROCEDURE — 85025 COMPLETE CBC W/AUTO DIFF WBC: CPT

## 2024-11-24 PROCEDURE — A9270 NON-COVERED ITEM OR SERVICE: HCPCS | Mod: UD | Performed by: STUDENT IN AN ORGANIZED HEALTH CARE EDUCATION/TRAINING PROGRAM

## 2024-11-24 RX ORDER — OXYCODONE HYDROCHLORIDE 10 MG/1
10 TABLET ORAL EVERY 4 HOURS PRN
Status: DISCONTINUED | OUTPATIENT
Start: 2024-11-24 | End: 2024-11-28 | Stop reason: HOSPADM

## 2024-11-24 RX ORDER — ENOXAPARIN SODIUM 100 MG/ML
40 INJECTION SUBCUTANEOUS DAILY
Status: DISCONTINUED | OUTPATIENT
Start: 2024-11-25 | End: 2024-11-25

## 2024-11-24 RX ADMIN — MORPHINE SULFATE 5 MG: 10 INJECTION, SOLUTION INTRAMUSCULAR; INTRAVENOUS at 05:48

## 2024-11-24 RX ADMIN — ACETAMINOPHEN 650 MG: 325 TABLET ORAL at 05:47

## 2024-11-24 RX ADMIN — GABAPENTIN 400 MG: 400 CAPSULE ORAL at 17:29

## 2024-11-24 RX ADMIN — GABAPENTIN 400 MG: 400 CAPSULE ORAL at 05:43

## 2024-11-24 RX ADMIN — SPIRONOLACTONE 50 MG: 25 TABLET ORAL at 05:42

## 2024-11-24 RX ADMIN — LACTULOSE 30 ML: 10 SOLUTION ORAL; RECTAL at 05:43

## 2024-11-24 RX ADMIN — OXYCODONE HYDROCHLORIDE 10 MG: 10 TABLET ORAL at 13:01

## 2024-11-24 RX ADMIN — SENNOSIDES AND DOCUSATE SODIUM 2 TABLET: 50; 8.6 TABLET ORAL at 17:29

## 2024-11-24 RX ADMIN — METOPROLOL TARTRATE 25 MG: 25 TABLET, FILM COATED ORAL at 17:30

## 2024-11-24 RX ADMIN — OMEPRAZOLE 20 MG: 20 CAPSULE, DELAYED RELEASE ORAL at 05:43

## 2024-11-24 RX ADMIN — ATORVASTATIN CALCIUM 20 MG: 20 TABLET, FILM COATED ORAL at 20:17

## 2024-11-24 RX ADMIN — OXYCODONE HYDROCHLORIDE 10 MG: 10 TABLET ORAL at 21:31

## 2024-11-24 RX ADMIN — OXYCODONE HYDROCHLORIDE 10 MG: 10 TABLET ORAL at 03:26

## 2024-11-24 RX ADMIN — LACTULOSE 30 ML: 10 SOLUTION ORAL; RECTAL at 13:01

## 2024-11-24 RX ADMIN — MORPHINE SULFATE 5 MG: 10 INJECTION, SOLUTION INTRAMUSCULAR; INTRAVENOUS at 20:17

## 2024-11-24 RX ADMIN — CEFTRIAXONE SODIUM 2000 MG: 10 INJECTION, POWDER, FOR SOLUTION INTRAVENOUS at 17:30

## 2024-11-24 RX ADMIN — LACTULOSE 30 ML: 10 SOLUTION ORAL; RECTAL at 17:28

## 2024-11-24 RX ADMIN — METOPROLOL TARTRATE 25 MG: 25 TABLET, FILM COATED ORAL at 05:43

## 2024-11-24 RX ADMIN — FUROSEMIDE 40 MG: 40 TABLET ORAL at 05:43

## 2024-11-24 RX ADMIN — OXYCODONE HYDROCHLORIDE 10 MG: 10 TABLET ORAL at 17:29

## 2024-11-24 ASSESSMENT — PATIENT HEALTH QUESTIONNAIRE - PHQ9
8. MOVING OR SPEAKING SO SLOWLY THAT OTHER PEOPLE COULD HAVE NOTICED. OR THE OPPOSITE, BEING SO FIGETY OR RESTLESS THAT YOU HAVE BEEN MOVING AROUND A LOT MORE THAN USUAL: NOT AT ALL
4. FEELING TIRED OR HAVING LITTLE ENERGY: SEVERAL DAYS
6. FEELING BAD ABOUT YOURSELF - OR THAT YOU ARE A FAILURE OR HAVE LET YOURSELF OR YOUR FAMILY DOWN: NOT AL ALL
2. FEELING DOWN, DEPRESSED, IRRITABLE, OR HOPELESS: SEVERAL DAYS
7. TROUBLE CONCENTRATING ON THINGS, SUCH AS READING THE NEWSPAPER OR WATCHING TELEVISION: NOT AT ALL
9. THOUGHTS THAT YOU WOULD BE BETTER OFF DEAD, OR OF HURTING YOURSELF: NOT AT ALL
SUM OF ALL RESPONSES TO PHQ QUESTIONS 1-9: 3
5. POOR APPETITE OR OVEREATING: NOT AT ALL
SUM OF ALL RESPONSES TO PHQ9 QUESTIONS 1 AND 2: 2
1. LITTLE INTEREST OR PLEASURE IN DOING THINGS: SEVERAL DAYS
3. TROUBLE FALLING OR STAYING ASLEEP OR SLEEPING TOO MUCH: NOT AT ALL

## 2024-11-24 ASSESSMENT — PAIN DESCRIPTION - PAIN TYPE
TYPE: ACUTE PAIN

## 2024-11-24 ASSESSMENT — SOCIAL DETERMINANTS OF HEALTH (SDOH)
IN THE PAST 12 MONTHS, HAS THE ELECTRIC, GAS, OIL, OR WATER COMPANY THREATENED TO SHUT OFF SERVICE IN YOUR HOME?: NO
WITHIN THE LAST YEAR, HAVE YOU BEEN KICKED, HIT, SLAPPED, OR OTHERWISE PHYSICALLY HURT BY YOUR PARTNER OR EX-PARTNER?: NO
IN THE PAST 12 MONTHS, HAS THE ELECTRIC, GAS, OIL, OR WATER COMPANY THREATENED TO SHUT OFF SERVICE IN YOUR HOME?: NO
WITHIN THE PAST 12 MONTHS, THE FOOD YOU BOUGHT JUST DIDN'T LAST AND YOU DIDN'T HAVE MONEY TO GET MORE: NEVER TRUE
WITHIN THE PAST 12 MONTHS, THE FOOD YOU BOUGHT JUST DIDN'T LAST AND YOU DIDN'T HAVE MONEY TO GET MORE: NEVER TRUE
WITHIN THE LAST YEAR, HAVE TO BEEN RAPED OR FORCED TO HAVE ANY KIND OF SEXUAL ACTIVITY BY YOUR PARTNER OR EX-PARTNER?: NO
WITHIN THE LAST YEAR, HAVE YOU BEEN AFRAID OF YOUR PARTNER OR EX-PARTNER?: NO
WITHIN THE LAST YEAR, HAVE YOU BEEN HUMILIATED OR EMOTIONALLY ABUSED IN OTHER WAYS BY YOUR PARTNER OR EX-PARTNER?: NO
WITHIN THE PAST 12 MONTHS, YOU WORRIED THAT YOUR FOOD WOULD RUN OUT BEFORE YOU GOT THE MONEY TO BUY MORE: NEVER TRUE

## 2024-11-24 ASSESSMENT — LIFESTYLE VARIABLES
TOTAL SCORE: 0
HOW MANY TIMES IN THE PAST YEAR HAVE YOU HAD 5 OR MORE DRINKS IN A DAY: 0
DOES PATIENT WANT TO STOP DRINKING: NO
HAVE YOU EVER FELT YOU SHOULD CUT DOWN ON YOUR DRINKING: NO
ON A TYPICAL DAY WHEN YOU DRINK ALCOHOL HOW MANY DRINKS DO YOU HAVE: 0
HAVE PEOPLE ANNOYED YOU BY CRITICIZING YOUR DRINKING: NO
ALCOHOL_USE: NO
EVER HAD A DRINK FIRST THING IN THE MORNING TO STEADY YOUR NERVES TO GET RID OF A HANGOVER: NO
CONSUMPTION TOTAL: NEGATIVE
TOTAL SCORE: 0
EVER FELT BAD OR GUILTY ABOUT YOUR DRINKING: NO
AVERAGE NUMBER OF DAYS PER WEEK YOU HAVE A DRINK CONTAINING ALCOHOL: 0
TOTAL SCORE: 0

## 2024-11-24 ASSESSMENT — COGNITIVE AND FUNCTIONAL STATUS - GENERAL
SUGGESTED CMS G CODE MODIFIER MOBILITY: CK
CLIMB 3 TO 5 STEPS WITH RAILING: TOTAL
MOVING FROM LYING ON BACK TO SITTING ON SIDE OF FLAT BED: A LITTLE
MOBILITY SCORE: 15
HELP NEEDED FOR BATHING: A LITTLE
MOVING TO AND FROM BED TO CHAIR: A LITTLE
DAILY ACTIVITIY SCORE: 22
WALKING IN HOSPITAL ROOM: A LOT
DRESSING REGULAR LOWER BODY CLOTHING: A LITTLE
SUGGESTED CMS G CODE MODIFIER DAILY ACTIVITY: CJ
STANDING UP FROM CHAIR USING ARMS: A LOT

## 2024-11-24 ASSESSMENT — ENCOUNTER SYMPTOMS
DIZZINESS: 1
ABDOMINAL PAIN: 1
VOMITING: 1
WEAKNESS: 1

## 2024-11-24 ASSESSMENT — FIBROSIS 4 INDEX
FIB4 SCORE: 4.89
FIB4 SCORE: 4.89

## 2024-11-24 NOTE — ED PROVIDER NOTES
ER Provider Note    Scribed for Delonte Dunham M.D. by David Wilson. 11/23/2024   6:27 PM    Primary Care Provider: Anum Gatica M.D.    CHIEF COMPLAINT  Chief Complaint   Patient presents with    Abdominal Pain     BIB ems from [facility]. Pt had a ultrasound done there following some abdominal pain which resulted in a abdominal aortic aneurysm measuring 3.3cm.     EXTERNAL RECORDS REVIEWED  Patient was seen on October 15th for anemia. CT angiography revealed no aneurysm at that time, ultimately her eliquis was stopped and the patient was discharged. Patient US from today showed a 3.3 cm abdominal aortic aneurysm, and ascites.    HPI/ROS  LIMITATION TO HISTORY   None noted   OUTSIDE HISTORIAN(S):  None noted     April Alicia is a 64 y.o. female who presents to the ED for evaluation of abdominal pain. Patient says she was instructed to come to the ED by her SNF facility following abnormal findings during her US today. Patient notes concern for gallbladder disease. Patient notes she has been having persistent stomach pain for the last 2 weeks. Patient notes she has a history of cirrhosis, but she does not recall if it has required draining before. Patient notes her cirrhosis is due to previous alcoholism. Patient is complaint with all of her prescribed medications. Patient endorses recent vomiting, she adds she has intermittent dizziness. Patient states she has been passing black and tarry stools which are intermittently bloody. Patient states her last bowel movement was this morning, and her CNA did not note black stool, but that her stool is usually dark and tarry.     PAST MEDICAL HISTORY  Past Medical History:   Diagnosis Date    Abnormal finding of lung 12/27/2022    Acute exacerbation of chronic obstructive pulmonary disease (COPD) (HCC) 01/27/2020    Alcoholic cirrhosis (HCC)     Arthritis     OA in knees    ASTHMA     Atrial fibrillation (HCC)     Dry eyes 11/16/2017    Edentulous      upper and lower dentures    Emphysema of lung ([facility])     H/O: hysterectomy 12/05/2019    Heart burn     left knee    Hepatic encephalopathy (HCC) 12/27/2022    Hepatitis C infection 07/28/2022    History of rheumatic fever 09/04/2017    Hypertension     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HCC)     Recurrent, L knee, x4 with surgical washout    Medication overuse headache 08/15/2017    Mitral regurgitation     Pancytopenia ([facility]) 07/26/2022    Pneumonia 02/2020    Primary osteoarthritis of left knee 08/25/2020    Prosthetic joint infection ([facility]) 2018    Right knee after total knee    Protein-calorie malnutrition, severe ([facility]) 08/01/2022    Right leg DVT ([facility]) 2018    acute, resolved    Seizure disorder ([facility])     Septic arthritis of knee, left ([facility]) 07/19/2022    Septic arthritis of shoulder, left ([facility]) 07/17/2022    Septic shock due to Pseudomonas species ([facility]) 10/30/2023    Type 2 diabetes mellitus ([facility]) 12/27/2022       SURGICAL HISTORY  Past Surgical History:   Procedure Laterality Date    KNEE AMPUTATION ABOVE Left 1/16/2024    Procedure: LEFT ABOVE KNEE AMPUTATION;  Surgeon: Obdulio Gray M.D.;  Location: [facility];  Service: Orthopedics    PB TOTAL KNEE ARTHROPLASTY Left 10/30/2023    Procedure: LEFT TOTAL KNEE ARTHROPLASTY EXPLANTATION, INSERTION OF ANTIBIOTIC SPACER, AND APPLICATION OF INCISIONAL WOUND VACUUM;  Surgeon: Wild Luz M.D.;  Location: [facility];  Service: Orthopedics    PB REVISE KNEE JOINT REPLACE,ALL PARTS Left 12/28/2022    Procedure: REVISION, TOTAL ARTHROPLASTY, KNEE, ALL COMPONENTS-LEFT;  Surgeon: Wild Luz M.D.;  Location: [facility];  Service: Orthopedics    IRRIGATION & DEBRIDEMENT GENERAL Left 12/28/2022    Procedure: IRRIGATION AND DEBRIDEMENT, WOUND;  Surgeon: Wild Luz M.D.;  Location: [facility];  Service: Orthopedics    PB REVISE KNEE JOINT REPLACE,ALL PARTS Left 7/19/2022    Procedure: REVISION, TOTAL ARTHROPLASTY, KNEE, ALL  COMPONENTS - ANTIBIOTIC SPACER;  Surgeon: Wild Luz M.D.;  Location: SUR[facility]ERY [facility];  Service: Orthopedics    IRRIGATION & DEBRIDEMENT GENERAL Left 7/17/2022    Procedure: IRRIGATION AND DEBRIDEMENT, SHOULDER;  Surgeon: Obdulio Gray M.D.;  Location: SURGERY [facility];  Service: Orthopedics    PB TOTAL KNEE ARTHROPLASTY Left 8/24/2020    Procedure: ARTHROPLASTY, KNEE, TOTAL;  Surgeon: Víctor Faustin M.D.;  Location: SURGERY [facility];  Service: Orthopedics    KNEE ARTHROPLASTY TOTAL Right 2018    IRRIGATION & DEBRIDEMENT ORTHO Right 9/3/2017    Procedure: IRRIGATION & DEBRIDEMENT ORTHO, POLY EXCHANGE;  Surgeon: Jerome Russell M.D.;  Location: SURGERY [facility];  Service: Orthopedics    COLONOSCOPY WITH CLIPPING  10/28/2015    Procedure: COLONOSCOPY WITH CLIPPING;  Surgeon: Ruben Colon M.D.;  Location: ENDOSCOPY [facility];  Service:     COLONOSCOPY WITH SCLEROTHERAPY  10/28/2015    Procedure: COLONOSCOPY WITH SCLEROTHERAPY;  Surgeon: Ruben Colon M.D.;  Location: ENDOSCOPY [facility];  Service:     COLONOSCOPY WITH TATTOOING  10/28/2015    Procedure: COLONOSCOPY WITH TATTOOING;  Surgeon: Ruben Colon M.D.;  Location: ENDOSCOPY [facility];  Service:     GYN SURGERY  2003    hysteroscoopy    GYN SURGERY  1982    tubal ligation       FAMILY HISTORY  Family History   Problem Relation Age of Onset    Diabetes Mother     Seizures Father     Heart Attack Father 45    Diabetes Brother     Alcohol abuse Brother     Dementia Brother     Diabetes Brother        SOCIAL HISTORY   reports that she quit smoking about 7 years ago. Her smoking use included cigarettes. She quit smokeless tobacco use about 3 years ago. She reports that she does not currently use alcohol. She reports that she does not currently use drugs.    CURRENT MEDICATIONS  Current Discharge Medication List        CONTINUE these medications which have NOT CHANGED    Details   oxyCODONE immediate  release (ROXICODONE) 10 MG immediate release tablet Take 10 mg by mouth every 6 hours as needed for Moderate Pain.      metoprolol SR (TOPROL XL) 25 MG TABLET SR 24 HR Take 25 mg by mouth every day.      spironolactone (ALDACTONE) 50 MG Tab Take 50 mg by mouth every day.      nitazoxanide (ALINIA) 500 MG tablet Take 500 mg by mouth 2 times a day. * DISCONTINUED 11/05/24*      insulin regular (HUMULIN R/NOVOLIN R) 100 Unit/mL Solution Inject 1-10 Units under the skin 4 Times a Day,Before Meals and at Bedtime. Inject per sliding scale. For blood glucose:  151 - 200 = 1 unit  201 - 250 = 3 units  251 - 300 = 5 units  301 - 350 = 7 units  351 - 400 = 8 units  If >400 = 10 units and call M.D.      furosemide (LASIX) 40 MG Tab Take 1 Tablet by mouth every day.    Associated Diagnoses: Bilateral leg edema      magnesium oxide (MAG-OX) 400 MG Tab tablet Take 400 mg by mouth every morning.      metFORMIN (GLUCOPHAGE) 500 MG Tab Take 1,000 mg by mouth every morning. 500 mg x 2 tablets = 1000 mg      potassium chloride SA (KDUR) 20 MEQ Tab CR Take 20 mEq by mouth every morning.      ascorbic acid (ASCORBIC ACID) 500 MG Tab Take 500 mg by mouth every morning.      ferrous sulfate 325 (65 Fe) MG tablet Take 325 mg by mouth 2 times a day.      insulin GLARGINE (LANTUS,SEMGLEE) 100 UNIT/ML Solution Inject 10 Units under the skin every evening.    Associated Diagnoses: Uncontrolled type 2 diabetes mellitus with hyperglycemia (HCC)      gabapentin (NEURONTIN) 400 MG Cap Take 400 mg by mouth 2 times a day.      pantoprazole (PROTONIX) 20 MG tablet Take 20 mg by mouth every morning.      lactulose 20 GM/30ML Solution Take 30 mL by mouth 3 times a day. Hold dose if 4 or more soft or loose stools in the last 24 hours, please gradually increase amount per dose by 5mls if you do not have at least 2 stools per day.  Qty: 450 mL, Refills: 3      atorvastatin (LIPITOR) 20 MG Tab Take 1 Tablet by mouth every evening.  Qty: 90 Tablet, Refills:  "3    Associated Diagnoses: Controlled type 2 diabetes mellitus with hyperglycemia, without long-term current use of insulin (HCC)      bisacodyl (DULCOLAX) 10 MG Suppos Insert 10 mg into the rectum 1 time a day as needed. Indications: Evacuation of Material from the Bowel      sodium phosphate 7-19 GM/118ML Enema Insert 1 Enema into the rectum as needed. Indications: Evacuation of Material from the Bowel      glucagon 1 mg Recon Soln Infuse 1 mg into a venous catheter one time.      Dextrose, Diabetic Use, (GLUCOSE) 77.4 % Gel Take 1 Tube by mouth see administration instructions. For BS < 60      magnesium hydroxide (MILK OF MAGNESIA) 400 MG/5ML Suspension Take 30 mL by mouth 1 time a day as needed. Indications: Constipation      acetaminophen (TYLENOL) 325 MG Tab Take 650 mg by mouth every 6 hours as needed for Mild Pain.      riFAXIMin (XIFAXAN) 550 MG Tab tablet Take 550 mg by mouth 2 times a day. DISCONTINUED ON 11/11/24  Indications: Impaired Brain Function due to Liver Disease      albuterol 108 (90 Base) MCG/ACT Aero Soln inhalation aerosol Inhale 2 Puffs every four hours as needed for Shortness of Breath.             ALLERGIES  Allergies   Allergen Reactions    Meropenem Swelling     Pt had rxt to meropenem June 2024 (marked tongue swelling and right facial/periorbital edema)    Tuberculin Tests Unspecified     Per MAR from [facility]        PHYSICAL EXAM  /63   Pulse (!) 110   Temp 36.8 °C (98.2 °F) (Temporal)   Resp 20   Ht 1.727 m (5' 8\")   Wt 93 kg (205 lb)   LMP 06/02/2008   SpO2 99%   BMI 31.17 kg/m²    General: Alert and oriented x 4 female, Uncomfortable appearing, non toxic appearing.  Head: Normocephalic atraumatic  Eyes: Extraocular motion intact, No scleral icterus  Neck: Supple, no rigidity  Cardiovascular: Tachycardic rate and irregularly irregular rhythm no murmurs rubs or gallops  Respiratory: Clear to auscultation bilaterally, equal chest rise and fall, no increased work of " breathing  Abdomen: Distended, nontender no guarding. Positive fluid wave and trace ascites on bedside ultrasound\\    Musculoskeletal: Warm and well perfused, no peripheral edema  Neuro: Alert, no focal deficits. No asterixis  Integumentary: No wounds or rashes, No jaundice    DIAGNOSTIC STUDIES    Labs:   Labs Reviewed   CBC WITH DIFFERENTIAL - Abnormal; Notable for the following components:       Result Value    WBC 4.1 (*)     RBC 2.92 (*)     Hemoglobin 8.5 (*)     Hematocrit 26.9 (*)     MCHC 31.6 (*)     RDW 67.7 (*)     Platelet Count 82 (*)     Lymphocytes 21.10 (*)     Lymphs (Absolute) 0.87 (*)     All other components within normal limits   COMP METABOLIC PANEL - Abnormal; Notable for the following components:    Co2 19 (*)     Glucose 115 (*)     Bun 23 (*)     Calcium 7.9 (*)     Alkaline Phosphatase 272 (*)     Albumin 2.8 (*)     Globulin 4.2 (*)     All other components within normal limits   PROTHROMBIN TIME - Abnormal; Notable for the following components:    PT 17.8 (*)     INR 1.46 (*)     All other components within normal limits   CBC WITH DIFFERENTIAL - Abnormal; Notable for the following components:    WBC 4.2 (*)     RBC 2.94 (*)     Hemoglobin 8.6 (*)     Hematocrit 27.0 (*)     MCHC 31.9 (*)     RDW 68.1 (*)     Platelet Count 79 (*)     Eosinophils 7.10 (*)     Lymphs (Absolute) 0.97 (*)     All other components within normal limits   COMP METABOLIC PANEL - Abnormal; Notable for the following components:    Co2 17 (*)     Glucose 103 (*)     Bun 23 (*)     Calcium 7.7 (*)     Alkaline Phosphatase 250 (*)     Albumin 2.5 (*)     Globulin 4.3 (*)     All other components within normal limits   LIPASE   AMMONIA   ESTIMATED GFR   PLATELET ESTIMATE   MORPHOLOGY   PERIPHERAL SMEAR REVIEW   IMMATURE PLT FRACTION   DIFFERENTIAL COMMENT   BLOOD CULTURE   BLOOD CULTURE   LACTIC ACID   MAGNESIUM   PHOSPHORUS   IMMATURE PLT FRACTION   ESTIMATED GFR   URINALYSIS   FLUID CELL COUNT   FLUID CULTURE  W/GRAM STAIN     Results for orders placed or performed during the hospital encounter of 24   EKG   Result Value Ref Range    Report       [facility] Emergency Dept.    Test Date:  2024  Pt Name:    FRANCISCA TURNER          Department: ER  MRN:        [id number]                      Room:       [facility]  Gender:     Female                       Technician: [id number]  :        1960                   Requested By:GRANT NUNEZ  Order #:    [id number]                    Reading MD: Grant Nunez    Measurements  Intervals                                Axis  Rate:       94                           P:          0  MO:         0                            QRS:        2  QRSD:       78                           T:          0  QT:         427  QTc:        535    Interpretive Statements  A-fib, rate 94, normal axis, Q waves in lead III, V3, unchanged compared to  prior 10/18/2024. prolonged QT  Electronically Signed On 2024 01:48:14 PST by Grant Nunez       *Note: Due to a large number of results and/or encounters for the requested time period, some results have not been displayed. A complete set of results can be found in Results Review.   EKG:   I have independently interpreted this EKG as detailed above.       Radiology:       Radiologist interpretation:   US-RUQ   Final Result      1. Cirrhotic appearing liver with perihepatic ascites.      2. There is a 6 mm gallbladder polyp.         US-PARACENTESIS, ABD WITH IMAGING    (Results Pending)        INITIAL ASSESSMENT COURSE AND PLAN  Care Narrative     6:27 PM - Patient presents to the ED for abdominal pain. Patient seen and examined at bedside. Discussed plan of care, including plan to further evaluate. Patient agrees to the plan of care. The patient will be medicated as ordered. Ordered for labs and imaging to evaluate their symptoms. Differentials include but not limited to: Decompensated cirrhosis, cholecystitis,  pancreatitis, hepatic encephalopathy, spontaneous bacterial peritonitis, portal venous thrombus.     Labs show essentially stable anemia, leukopenia. mild base deficit.     Performed a bedside ultrasound which shows ascites, however the pocket is small, and unable to safely do paracentesis in the emergency department.  Patient will be treated empirically for SBP with ceftriaxone after discussion with pharmacy.    Patient had atrial fibrillation with RVR, responded well to metoprolol 5 mg.    8:45 PM - Patient was reevaluated at bedside. Discussed lab and radiology results with the patient and informed them of plan for admission..          9:07 PM I discussed the patient's case and the above findings with Dr. Mills (hospitalist) who accepts admission.    Due to a nationwide and hospital wide critical IV fluid shortage, IV hydration was deferred in favor of oral hydration as best tolerated, prior to the consideration of IV fluids during this patient interaction.       DISPOSITION:  Patient will be hospitalized by Dr. Mills in guarded condition.    FINAL DIAGNOSIS  1. Atrial fibrillation with RVR (HCC)    2. Generalized abdominal pain    3. Alcoholic cirrhosis of liver with ascites (HCC)         David ARGUETA (Lisandro), am scribing for, and in the presence of, Grant Dunham M.D..    Electronically signed by: David Wilson (Lisandro), 11/23/2024    IGrant M.D. personally performed the services described in this documentation, as scribed by David Wilson in my presence, and it is both accurate and complete.      The note accurately reflects work and decisions made by me.  Grant Dunham M.D.  11/24/2024  1:57 AM

## 2024-11-24 NOTE — DISCHARGE PLANNING
Patient rolled back to observation/outpatient status per attending MD determination, Dr. Elva Lerma, and IPA MD secondary reviewer, Dr. Evan Harrison. Condition code 44 completed.

## 2024-11-24 NOTE — ASSESSMENT & PLAN NOTE
Presenting with abdominal pain, imaging shows small volume ascites, ERP did not feel there was enough fluid at this time for safe paracentesis, deferred.  Started on ceftriaxone in the ED, will continue for now for SBP.  Continue lactulose, rifaximin, spironolactone, Lasix.  Continue monitoring  Monitoring

## 2024-11-24 NOTE — CARE PLAN
The patient is Stable - Low risk of patient condition declining or worsening    Shift Goals  Clinical Goals: Hemodynamic stability; pain control  Patient Goals: feel better    Progress made toward(s) clinical / shift goals:  Pt AOX4, very pleasant and cooperative; pt has acute abdominal pain and ascites, as well as chronic left shoulder and right leg pain; pt given morphine in ED which she states did not help much; pt pain treated to her satisfaction w/ oxy 10; pt requested oxy before time and Krunal Irving contacted for orders; pt skin including sacrum is well cared for and intact; TAPS applied to bed for pt positioning; pt moves self freely from side to side; discussed POC w/ pt who stated understanding; oriented to room and unit; Bed low and locked; call bell and belongings in reach    Problem: Skin Integrity  Goal: Risk for impaired skin integrity will decrease  Outcome: Progressing     Problem: Knowledge Deficit  Goal: Knowledge of disease process/condition, treatment plan, diagnostic tests, and medications will improve  Outcome: Progressing

## 2024-11-24 NOTE — ED TRIAGE NOTES
Chief Complaint   Patient presents with    Abdominal Pain     BIB ems from [facility]. Pt had a ultrasound done there following some abdominal pain which resulted in a abdominal aortic aneurysm measuring 3.3cm.       Vitals:    11/23/24 1820   BP: 113/63   Pulse: (!) 110   Resp: 20   Temp: 36.8 °C (98.2 °F)   SpO2: 99%

## 2024-11-24 NOTE — DISCHARGE PLANNING
Care Transition Team Assessment    Chart review completed to obtain the information used in this assessment. Pt is a long term resident at [facility] where pt can receive assistance with all ADLs/IADLs as needed. Pt accepted back to [place], pending medical clearance.    [facility] #[id number]  LOC #[id number]    Information Source  Orientation Level: Oriented X4  Information Given By: Other (Comments) (chart review)  Who is responsible for making decisions for patient? : Patient    Readmission Evaluation  Is this a readmission?: No    Elopement Risk  Legal Hold: No  Ambulatory or Self Mobile in Wheelchair: No-Not an Elopement Risk  Disoriented: No  Psychiatric Symptoms: None  History of Wandering: No  Elopement this Admit: No  Vocalizing Wanting to Leave: No  Displays Behaviors, Body Language Wanting to Leave: No-Not at Risk for Elopement  Elopement Risk: Not at Risk for Elopement    Interdisciplinary Discharge Planning  Lives with - Patient's Self Care Capacity: Attendant / Paid Care Giver  Patient or legal guardian wants to designate a caregiver: No  Support Systems: Family Member(s)  Housing / Facility: Skilled Nursing Facility    Discharge Preparedness  What is your plan after discharge?: Skilled nursing facility  What are your discharge supports?: Child, Other (comment) (SNF staff)  Prior Functional Level: Needs Assist with Activities of Daily Living, Needs Assist with Medication Management  Difficulity with ADLs: Bathing, Dressing, Toileting, Walking  Difficulity with IADLs: Cooking, Driving, Keeping track of finances, Laundry, Managing medication, Shopping    Functional Assesment  Prior Functional Level: Needs Assist with Activities of Daily Living, Needs Assist with Medication Management    Finances  Financial Barriers to Discharge: No  Prescription Coverage: Yes    Vision / Hearing Impairment  Vision Impairment : Yes  Right Eye Vision: Impaired, Wears Glasses  Left Eye Vision: Impaired, Wears Glasses  Hearing  Impairment : Yes  Hearing Impairment: Both Ears, Hearing Device Not Available  Does Pt Need Special Equipment for the Hearing Impaired?: No    Advance Directive  Advance Directive?: POL, Living Will, DPOA for Health Care  Durable Power of  Name and Contact : Ava Laureano [phone]    Domestic Abuse  Have you ever been the victim of abuse or violence?: No  Possible Abuse/Neglect Reported to:: Not Applicable    Psychological Assessment  History of Substance Abuse: None  History of Psychiatric Problems: No  Non-compliant with Treatment: No  Newly Diagnosed Illness: No    Discharge Risks or Barriers  Discharge risks or barriers?: No    Anticipated Discharge Information  Discharge Disposition: D/T to SNF with Medicare cert in anticipation of skilled care (03)  Discharge Address: [address] [place] [id number]  Discharge Contact Phone Number: [phone]

## 2024-11-24 NOTE — ASSESSMENT & PLAN NOTE
Recently stopped Eliquis, presented with RVR.  Change oral metoprolol to Coreg for both A-fib and portal hypertension  IV metoprolol on for RVR.  Monitor on telemetry.  Follow potassium magnesium and potassium greater than 4 magnesium greater than 2.    Holding Eliquis

## 2024-11-24 NOTE — ED NOTES
Maria RN @ bedside to take pt to floor. NAD. VSS. Respirations equal and unlabored. All belongings with patient.

## 2024-11-24 NOTE — ASSESSMENT & PLAN NOTE
"Unclear reports regarding hematochezia versus melena versus brown stool.  She is on p.o. iron outpatient, Eliquis was stopped.  Per last DC summary \"I discussed with her the risks and benefits of discontinuing her Eliquis due to the drop in hemoglobin and risk for further bleeding. Discussed with patient that her risk for stroke does increase if she were to go off of her Eliquis. Patient is in agreement of stopping her Eliquis due to her increased risk of bleeding and current drop in hemoglobin. She states that she understands the risks and she is okay with the change.\"  DC Lovenox  Omeprazole twice daily  GI consulted as patient has varices on CT, plan for EGD tomorrow  -N.p.o. after midnight    Patient was going to get EGD today but unfortunately she ate this morning procedure was canceled and rescheduled for tomorrow patient will be kept n.p.o. after midnight clear liquid diet for now    EGD today showed Portal hypertensive gastropathy which is likely the source of intermittent melena. No high risk lesion to treat today.     "

## 2024-11-24 NOTE — ED NOTES
Med rec updated and complete. Allergies reviewed.     Per HAVEN from [facility].    No anticoagulant or antiplatelets noted.    Rifaximin discontinued on 11/11/24    [facility]  [place]  [phone]

## 2024-11-24 NOTE — H&P
Hospital Medicine History & Physical Note    Date of Service  11/23/2024    Primary Care Physician  Anum Gatica M.D.    Consultants  None    Code Status  Full Code    Chief Complaint  Chief Complaint   Patient presents with    Abdominal Pain     BIB ems from [facility]. Pt had a ultrasound done there following some abdominal pain which resulted in a abdominal aortic aneurysm measuring 3.3cm.       History of Presenting Illness  April Alicia is a 64 y.o. female with extensive medical history including alcohol cirrhosis of the liver, chronic anemia, recent ESBL UTI, A-fib previously on Eliquis, GI bleeds, GERD, asthma, hypertension, HCV, type 2 diabetes, who presented 11/23/2024 with progressive abdominal pain.    Patient was in [facility] complaining of abdominal pain, ultrasound was performed negative for gallbladder concerns, did not show possible 3.3 cm abdominal aortic aneurysm and ascites.  Due to the ongoing abdominal pain that has been persistent for the past 2 weeks and history of cirrhosis she was sent to the ED.  Also reports nonbloody vomiting, has intermittent dizziness, also have been having some melanotic stools intermittently bloody.  CNA from facility in regards to her stool this morning notes they were not black, stools usually dark and tarry.  Abdominal pain is generalized, moderate to severe, generalized.  No fevers or chills headache or vision changes chest pain dyspnea cough dysuria or diarrhea.    In the ED she is afebrile pulse from 70s to 120s pulse ox 15-20 systolic blood pressure 113-150 pulse ox 98 100% on room air.  WBC 4.1 hemoglobin 8.5 platelets 82 bicarb 19 glucose 115 BUN 23 lactic acid 1.0 INR 1.46, EKG A-fib low voltage extremity leads prolonged QTc 535, right upper quadrant ultrasound shows cirrhotic appearing liver with perihepatic ascites, 6 mm gallbladder polyp.  Blood cultures were obtained, workup as above, analgesia given ceftriaxone started subsequently  referred to hospitalist for admission.    I discussed the plan of care with patient, bedside RN, charge RN, , and pharmacy.    Review of Systems  Review of Systems   Constitutional:  Positive for malaise/fatigue.   Gastrointestinal:  Positive for abdominal pain and vomiting.   Neurological:  Positive for dizziness.       Past Medical History   has a past medical history of Abnormal finding of lung (12/27/2022), Acute exacerbation of chronic obstructive pulmonary disease (COPD) ([facility]) (01/27/2020), Alcoholic cirrhosis ([facility]), Arthritis, ASTHMA, Atrial fibrillation ([facility]), Dry eyes (11/16/2017), Edentulous, Emphysema of lung ([facility]), H/O: hysterectomy (12/05/2019), Heart burn, Hepatic encephalopathy ([facility]) (12/27/2022), Hepatitis C infection (07/28/2022), History of rheumatic fever (09/04/2017), Hypertension, 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[facility]), Medication overuse headache (08/15/2017), Mitral regurgitation, Pancytopenia ([facility]) (07/26/2022), Pneumonia (02/2020), Primary osteoarthritis of left knee (08/25/2020), Prosthetic joint infection ([facility]) (2018), Protein-calorie malnutrition, severe ([facility]) (08/01/2022), Right leg DVT ([facility]) (2018), Seizure disorder ([facility]), Septic arthritis of knee, left ([facility]) (07/19/2022), Septic arthritis of shoulder, left ([facility]) (07/17/2022), Septic shock due to Pseudomonas species ([facility]) (10/30/2023), and Type 2 diabetes mellitus ([facility]) (12/27/2022).    Surgical History   has a past surgical history that includes gyn surgery (1982); gyn surgery (2003); colonoscopy with clipping (10/28/2015); colonoscopy with sclerotherapy (10/28/2015); colonoscopy with tattooing (10/28/2015); irrigation & debridement ortho (Right, 9/3/2017); knee arthroplasty total (Right, 2018); pr total knee arthroplasty (Left, 8/24/2020); irrigation & debridement general (Left, 7/17/2022); pr revise knee joint replace,all parts (Left, 7/19/2022); pr revise knee joint replace,all parts (Left, 12/28/2022); irrigation &  debridement general (Left, 12/28/2022); pr total knee arthroplasty (Left, 10/30/2023); and knee amputation above (Left, 1/16/2024).     Family History  family history includes Alcohol abuse in her brother; Dementia in her brother; Diabetes in her brother, brother, and mother; Heart Attack (age of onset: 45) in her father; Seizures in her father.     Social History   reports that she quit smoking about 7 years ago. Her smoking use included cigarettes. She quit smokeless tobacco use about 3 years ago. She reports that she does not currently use alcohol. She reports that she does not currently use drugs.    Allergies  Allergies   Allergen Reactions    Meropenem Swelling     Pt had rxt to meropenem June 2024 (marked tongue swelling and right facial/periorbital edema)    Tuberculin Tests Unspecified     Per MAR from [facility]       Medications  Prior to Admission Medications   Prescriptions Last Dose Informant Patient Reported? Taking?   Dextrose, Diabetic Use, (GLUCOSE) 77.4 % Gel Unknown MAR from Other Facility Yes No   Sig: Take 1 Tube by mouth see administration instructions. For BS < 60   acetaminophen (TYLENOL) 325 MG Tab Unknown MAR from Other Facility Yes No   Sig: Take 650 mg by mouth every 6 hours as needed for Mild Pain.   albuterol 108 (90 Base) MCG/ACT Aero Soln inhalation aerosol Unknown MAR from Other Facility Yes No   Sig: Inhale 2 Puffs every four hours as needed for Shortness of Breath.   ascorbic acid (ASCORBIC ACID) 500 MG Tab 11/23/2024 Morning MAR from Other Facility Yes Yes   Sig: Take 500 mg by mouth every morning.   atorvastatin (LIPITOR) 20 MG Tab 11/22/2024 Bedtime MAR from Other Facility No Yes   Sig: Take 1 Tablet by mouth every evening.   bisacodyl (DULCOLAX) 10 MG Suppos Unknown MAR from Other Facility Yes No   Sig: Insert 10 mg into the rectum 1 time a day as needed. Indications: Evacuation of Material from the Bowel   ferrous sulfate 325 (65 Fe) MG tablet 11/23/2024 at  8:00 AM MAR from  Other Facility Yes Yes   Sig: Take 325 mg by mouth 2 times a day.   furosemide (LASIX) 40 MG Tab 11/23/2024 Morning MAR from Other Facility No Yes   Sig: Take 1 Tablet by mouth every day.   gabapentin (NEURONTIN) 400 MG Cap 11/23/2024 at  8:00 AM MAR from Other Facility Yes Yes   Sig: Take 400 mg by mouth 2 times a day.   glucagon 1 mg Recon Soln Unknown MAR from Other Facility Yes No   Sig: Infuse 1 mg into a venous catheter one time.   insulin GLARGINE (LANTUS,SEMGLEE) 100 UNIT/ML Solution 11/22/2024 Bedtime MAR from Other Facility No Yes   Sig: Inject 10 Units under the skin every evening.   insulin regular (HUMULIN R/NOVOLIN R) 100 Unit/mL Solution 11/23/2024 at  5:30 PM MAR from Other Facility Yes Yes   Sig: Inject 1-10 Units under the skin 4 Times a Day,Before Meals and at Bedtime. Inject per sliding scale. For blood glucose:  151 - 200 = 1 unit  201 - 250 = 3 units  251 - 300 = 5 units  301 - 350 = 7 units  351 - 400 = 8 units  If >400 = 10 units and call M.D.   lactulose 20 GM/30ML Solution 11/23/2024 at  2:00 PM MAR from Other Facility No Yes   Sig: Take 30 mL by mouth 3 times a day. Hold dose if 4 or more soft or loose stools in the last 24 hours, please gradually increase amount per dose by 5mls if you do not have at least 2 stools per day.   magnesium hydroxide (MILK OF MAGNESIA) 400 MG/5ML Suspension Unknown MAR from Other Facility Yes No   Sig: Take 30 mL by mouth 1 time a day as needed. Indications: Constipation   magnesium oxide (MAG-OX) 400 MG Tab tablet 11/23/2024 Morning MAR from Other Facility Yes Yes   Sig: Take 400 mg by mouth every morning.   metFORMIN (GLUCOPHAGE) 500 MG Tab 11/23/2024 Morning MAR from Other Facility Yes Yes   Sig: Take 1,000 mg by mouth every morning. 500 mg x 2 tablets = 1000 mg   metoprolol SR (TOPROL XL) 25 MG TABLET SR 24 HR 11/23/2024 Morning MAR from Other Facility Yes Yes   Sig: Take 25 mg by mouth every day.   nitazoxanide (ALINIA) 500 MG tablet  MAR from Other  Facility Yes Yes   Sig: Take 500 mg by mouth 2 times a day. * DISCONTINUED 11/05/24*   oxyCODONE immediate release (ROXICODONE) 10 MG immediate release tablet 11/23/2024 at  2:01 PM MAR from Other Facility Yes Yes   Sig: Take 10 mg by mouth every 6 hours as needed for Moderate Pain.   pantoprazole (PROTONIX) 20 MG tablet 11/23/2024 Morning MAR from Other Facility Yes Yes   Sig: Take 20 mg by mouth every morning.   potassium chloride SA (KDUR) 20 MEQ Tab CR 11/23/2024 Morning MAR from Other Facility Yes Yes   Sig: Take 20 mEq by mouth every morning.   riFAXIMin (XIFAXAN) 550 MG Tab tablet  MAR from Other Facility Yes No   Sig: Take 550 mg by mouth 2 times a day. DISCONTINUED ON 11/11/24  Indications: Impaired Brain Function due to Liver Disease   sodium phosphate 7-19 GM/118ML Enema Unknown MAR from Other Facility Yes No   Sig: Insert 1 Enema into the rectum as needed. Indications: Evacuation of Material from the Bowel   spironolactone (ALDACTONE) 50 MG Tab 11/23/2024 Morning MAR from Other Facility Yes Yes   Sig: Take 50 mg by mouth every day.      Facility-Administered Medications: None       Physical Exam  Temp:  [36.8 °C (98.2 °F)] 36.8 °C (98.2 °F)  Pulse:  [] 79  Resp:  [15-20] 15  BP: (113-150)/(63-95) 130/79  SpO2:  [98 %-100 %] 99 %  Blood Pressure: 130/79   Temperature: 36.8 °C (98.2 °F)   Pulse: 79   Respiration: 15   Pulse Oximetry: 99 %       Physical Exam  Vitals and nursing note reviewed.   Constitutional:       General: She is not in acute distress.     Appearance: She is obese. She is not toxic-appearing.   HENT:      Head: Normocephalic and atraumatic.      Nose: Nose normal. No rhinorrhea.      Mouth/Throat:      Mouth: Mucous membranes are moist.      Pharynx: Oropharynx is clear.   Eyes:      General: No scleral icterus.     Extraocular Movements: Extraocular movements intact.      Conjunctiva/sclera: Conjunctivae normal.   Cardiovascular:      Rate and Rhythm: Normal rate. Rhythm  "irregular.      Pulses: Normal pulses.   Pulmonary:      Effort: No respiratory distress.      Breath sounds: No wheezing, rhonchi or rales.   Abdominal:      General: There is distension.      Palpations: Abdomen is soft.      Tenderness: There is abdominal tenderness. There is no guarding or rebound.   Musculoskeletal:         General: Deformity: left AKA. Normal range of motion.      Cervical back: Normal range of motion and neck supple. No rigidity.   Skin:     General: Skin is warm and dry.      Capillary Refill: Capillary refill takes less than 2 seconds.   Neurological:      General: No focal deficit present.      Mental Status: She is alert and oriented to person, place, and time. Mental status is at baseline.      Cranial Nerves: No cranial nerve deficit.   Psychiatric:         Mood and Affect: Mood normal.         Behavior: Behavior normal.         Thought Content: Thought content normal.         Judgment: Judgment normal.         Laboratory:  Recent Labs     11/23/24 1857   WBC 4.1*   RBC 2.92*   HEMOGLOBIN 8.5*   HEMATOCRIT 26.9*   MCV 92.1   MCH 29.1   MCHC 31.6*   RDW 67.7*   PLATELETCT 82*   MPV 10.4     Recent Labs     11/23/24 1857   SODIUM 137   POTASSIUM 4.5   CHLORIDE 110   CO2 19*   GLUCOSE 115*   BUN 23*   CREATININE 0.67   CALCIUM 7.9*     Recent Labs     11/23/24 1857   ALTSGPT 9   ASTSGOT 21   ALKPHOSPHAT 272*   TBILIRUBIN 0.9   LIPASE 29   GLUCOSE 115*     Recent Labs     11/23/24  1857   INR 1.46*     No results for input(s): \"NTPROBNP\" in the last 72 hours.      No results for input(s): \"TROPONINT\" in the last 72 hours.    Imaging:  US-RUQ   Final Result      1. Cirrhotic appearing liver with perihepatic ascites.      2. There is a 6 mm gallbladder polyp.             EKG:  I have personally reviewed the images and compared with prior images.    Assessment/Plan:  Justification for Admission Status  I anticipate this patient will require at least two midnights for appropriate medical " management, necessitating inpatient admission because A-fib with RVR, cirrhosis, SBP      * Atrial fibrillation with RVR (HCC)- (present on admission)  Assessment & Plan  Presented in RVR, responded well to IV metoprolol.  Continue p.o. metoprolol  Monitor on telemetry.  Check magnesium.  Recently stopped Eliquis.  Keep magnesium greater than 2 potassium greater than 4    SBP (spontaneous bacterial peritonitis) (HCC)  Assessment & Plan  Suspicious for, has been having worsening abdominal pain, some distention.  Paracentesis ordered, patient was started on ceftriaxone blood cultures were drawn.  Will continue ceftriaxone for now, follow cultures.    Bloody stool  Assessment & Plan  Unclear reports regarding hematochezia versus melena versus brown stool.  She is on p.o. iron outpatient, recently treated for GI bleed, Eliquis was stopped.  On stools and hemoglobin count here, not having large-volume GI bleed at this time.  Continue PPI    Narcotic dependence (HCC)- (present on admission)  Assessment & Plan  Continue p.o. regimen  Analgesia as needed, adjust regimen as indicated    Mild intermittent asthma without complication- (present on admission)  Assessment & Plan  None acute exacerbation.  DuoNebs as needed.  Continuous pulse ox    Type 2 diabetes mellitus with diabetic neuropathy, with long-term current use of insulin ([facility])- (present on admission)  Assessment & Plan  Diabetic diet, glucose 115 on admission, glucose on BMP.    GERD (gastroesophageal reflux disease)- (present on admission)  Assessment & Plan  Continue Protonix    Dyslipidemia- (present on admission)  Assessment & Plan  Continue statin    Paroxysmal atrial fibrillation- (present on admission)  Assessment & Plan  Recently stopped Eliquis, continue metoprolol, presented with RVR.  IV metoprolol on for RVR.  Monitor on telemetry.  Follow potassium magnesium and potassium greater than 4 magnesium greater than 2.    Hypertension- (present on  admission)  Assessment & Plan  On metoprolol.  IV antihypertensives ordered with parameters.    Thrombocytopenia (HCC)- (present on admission)  Assessment & Plan  Secondary to cirrhosis, no active bleeding    Alcoholic cirrhosis of liver (HCC)- (present on admission)  Assessment & Plan  Presenting with abdominal pain, imaging shows small volume ascites, ERP did not feel there was enough fluid at this time for safe paracentesis, deferred.  Started on ceftriaxone in the ED, will continue for now for SBP.  Continue lactulose, rifaximin, spironolactone, Lasix        VTE prophylaxis: SCDs/TEDs  Total time spent on admission 77 minutes.    This included my review with ER physician, review of ED events, patient's history, outside records, recent records, face to face interview, physical examination; my review of lab results (CBC, chemistry panel), imaging review (CXR) and ECG. Also includes counseling patient on admission, treatment plan, and documentation of encounter.  In addition, speaking with specialist Dr. Dunham

## 2024-11-24 NOTE — ASSESSMENT & PLAN NOTE
Paracentesis ordered to rule out, patient was started on ceftriaxone blood cultures were drawn.  Will continue ceftriaxone for now, follow cultures.  Completing antibiotic as

## 2024-11-24 NOTE — ED NOTES
Bedside report received from off going RN/tech: Kb MONTAÑO, assumed care of patient.  POC discussed with patient. Call light within reach, all needs addressed at this time.       Fall risk interventions in place: Move the patient closer to the nurse's station, Patient's personal possessions are with in their safe reach, Place socks on patient, Place fall risk sign on patient's door, Give patient urinal if applicable, and Keep floor surfaces clean and dry (all applicable per [place] Fall risk assessment)   Continuous monitoring: Cardiac Leads, Pulse Ox, or Blood Pressure  IVF/IV medications: Not Applicable   Oxygen: Room Air  Bedside sitter: Not Applicable   Isolation: Not Applicable

## 2024-11-24 NOTE — ASSESSMENT & PLAN NOTE
Presented in RVR, responded well to IV metoprolol.  Continue p.o. metoprolol  Monitor on telemetry.  Check magnesium.  Recently stopped Eliquis.  Keep magnesium greater than 2 potassium greater than 4  Rate control.

## 2024-11-24 NOTE — RESPIRATORY CARE
COPD EDUCATION by COPD CLINICAL EDUCATOR  11/24/2024 at 7:57 AM by Daniela Morris, RRT     Patient reviewed by COPD education team. Patient does not have a history or diagnosis of COPD and is a non-smoker.  Therefore, patient does not qualify for the COPD program.  PFT in EMR 2/20/2020: FEV1-118, FEV1/FVC Ratio 79.9 confirms no obstructive process -normal airflow.

## 2024-11-25 PROBLEM — K76.6 PORTAL HYPERTENSION (HCC): Status: ACTIVE | Noted: 2024-11-25

## 2024-11-25 LAB
ALBUMIN SERPL BCP-MCNC: 2.5 G/DL (ref 3.2–4.9)
ALBUMIN/GLOB SERPL: 0.6 G/DL
ALP SERPL-CCNC: 230 U/L (ref 30–99)
ALT SERPL-CCNC: 10 U/L (ref 2–50)
AMMONIA PLAS-SCNC: 99 UMOL/L (ref 11–45)
ANION GAP SERPL CALC-SCNC: 6 MMOL/L (ref 7–16)
AST SERPL-CCNC: 23 U/L (ref 12–45)
BILIRUB SERPL-MCNC: 0.7 MG/DL (ref 0.1–1.5)
BUN SERPL-MCNC: 25 MG/DL (ref 8–22)
CALCIUM ALBUM COR SERPL-MCNC: 9.2 MG/DL (ref 8.5–10.5)
CALCIUM SERPL-MCNC: 8 MG/DL (ref 8.5–10.5)
CHLORIDE SERPL-SCNC: 110 MMOL/L (ref 96–112)
CO2 SERPL-SCNC: 20 MMOL/L (ref 20–33)
CREAT SERPL-MCNC: 0.75 MG/DL (ref 0.5–1.4)
ERYTHROCYTE [DISTWIDTH] IN BLOOD BY AUTOMATED COUNT: 65.3 FL (ref 35.9–50)
GFR SERPLBLD CREATININE-BSD FMLA CKD-EPI: 88 ML/MIN/1.73 M 2
GLOBULIN SER CALC-MCNC: 4.3 G/DL (ref 1.9–3.5)
GLUCOSE SERPL-MCNC: 98 MG/DL (ref 65–99)
HCT VFR BLD AUTO: 27.5 % (ref 37–47)
HGB BLD-MCNC: 8.7 G/DL (ref 12–16)
MAGNESIUM SERPL-MCNC: 1.8 MG/DL (ref 1.5–2.5)
MCH RBC QN AUTO: 28.7 PG (ref 27–33)
MCHC RBC AUTO-ENTMCNC: 31.6 G/DL (ref 32.2–35.5)
MCV RBC AUTO: 90.8 FL (ref 81.4–97.8)
PHOSPHATE SERPL-MCNC: 4.5 MG/DL (ref 2.5–4.5)
PLATELET # BLD AUTO: 79 K/UL (ref 164–446)
PLATELETS.RETICULATED NFR BLD AUTO: 1 % (ref 0.6–13.1)
PMV BLD AUTO: 9.2 FL (ref 9–12.9)
POTASSIUM SERPL-SCNC: 4.8 MMOL/L (ref 3.6–5.5)
PROT SERPL-MCNC: 6.8 G/DL (ref 6–8.2)
RBC # BLD AUTO: 3.03 M/UL (ref 4.2–5.4)
SODIUM SERPL-SCNC: 136 MMOL/L (ref 135–145)
WBC # BLD AUTO: 3.7 K/UL (ref 4.8–10.8)

## 2024-11-25 PROCEDURE — 82140 ASSAY OF AMMONIA: CPT

## 2024-11-25 PROCEDURE — 83735 ASSAY OF MAGNESIUM: CPT

## 2024-11-25 PROCEDURE — 770020 HCHG ROOM/CARE - TELE (206)

## 2024-11-25 PROCEDURE — 80053 COMPREHEN METABOLIC PANEL: CPT

## 2024-11-25 PROCEDURE — A9270 NON-COVERED ITEM OR SERVICE: HCPCS | Performed by: INTERNAL MEDICINE

## 2024-11-25 PROCEDURE — 700102 HCHG RX REV CODE 250 W/ 637 OVERRIDE(OP): Performed by: INTERNAL MEDICINE

## 2024-11-25 PROCEDURE — 99233 SBSQ HOSP IP/OBS HIGH 50: CPT | Performed by: INTERNAL MEDICINE

## 2024-11-25 PROCEDURE — 700111 HCHG RX REV CODE 636 W/ 250 OVERRIDE (IP): Mod: JZ,UD

## 2024-11-25 PROCEDURE — 36415 COLL VENOUS BLD VENIPUNCTURE: CPT

## 2024-11-25 PROCEDURE — 84100 ASSAY OF PHOSPHORUS: CPT

## 2024-11-25 PROCEDURE — 700102 HCHG RX REV CODE 250 W/ 637 OVERRIDE(OP): Performed by: STUDENT IN AN ORGANIZED HEALTH CARE EDUCATION/TRAINING PROGRAM

## 2024-11-25 PROCEDURE — A9270 NON-COVERED ITEM OR SERVICE: HCPCS | Mod: UD

## 2024-11-25 PROCEDURE — A9270 NON-COVERED ITEM OR SERVICE: HCPCS | Performed by: STUDENT IN AN ORGANIZED HEALTH CARE EDUCATION/TRAINING PROGRAM

## 2024-11-25 PROCEDURE — 700111 HCHG RX REV CODE 636 W/ 250 OVERRIDE (IP): Performed by: STUDENT IN AN ORGANIZED HEALTH CARE EDUCATION/TRAINING PROGRAM

## 2024-11-25 PROCEDURE — 99254 IP/OBS CNSLTJ NEW/EST MOD 60: CPT | Performed by: INTERNAL MEDICINE

## 2024-11-25 PROCEDURE — 85055 RETICULATED PLATELET ASSAY: CPT

## 2024-11-25 PROCEDURE — 85027 COMPLETE CBC AUTOMATED: CPT

## 2024-11-25 PROCEDURE — 700105 HCHG RX REV CODE 258: Performed by: STUDENT IN AN ORGANIZED HEALTH CARE EDUCATION/TRAINING PROGRAM

## 2024-11-25 PROCEDURE — 700102 HCHG RX REV CODE 250 W/ 637 OVERRIDE(OP): Mod: UD

## 2024-11-25 RX ORDER — CARVEDILOL 3.12 MG/1
3.12 TABLET ORAL 2 TIMES DAILY WITH MEALS
Status: DISCONTINUED | OUTPATIENT
Start: 2024-11-26 | End: 2024-11-28 | Stop reason: HOSPADM

## 2024-11-25 RX ADMIN — LACTULOSE 30 ML: 10 SOLUTION ORAL; RECTAL at 12:09

## 2024-11-25 RX ADMIN — OMEPRAZOLE 20 MG: 20 CAPSULE, DELAYED RELEASE ORAL at 17:15

## 2024-11-25 RX ADMIN — OMEPRAZOLE 20 MG: 20 CAPSULE, DELAYED RELEASE ORAL at 05:27

## 2024-11-25 RX ADMIN — CEFTRIAXONE SODIUM 2000 MG: 10 INJECTION, POWDER, FOR SOLUTION INTRAVENOUS at 17:15

## 2024-11-25 RX ADMIN — FUROSEMIDE 40 MG: 40 TABLET ORAL at 05:27

## 2024-11-25 RX ADMIN — GABAPENTIN 400 MG: 400 CAPSULE ORAL at 05:27

## 2024-11-25 RX ADMIN — ATORVASTATIN CALCIUM 20 MG: 20 TABLET, FILM COATED ORAL at 20:11

## 2024-11-25 RX ADMIN — OXYCODONE HYDROCHLORIDE 10 MG: 10 TABLET ORAL at 10:14

## 2024-11-25 RX ADMIN — SPIRONOLACTONE 50 MG: 25 TABLET ORAL at 05:27

## 2024-11-25 RX ADMIN — MORPHINE SULFATE 5 MG: 10 INJECTION, SOLUTION INTRAMUSCULAR; INTRAVENOUS at 05:49

## 2024-11-25 RX ADMIN — METOPROLOL TARTRATE 25 MG: 25 TABLET, FILM COATED ORAL at 05:27

## 2024-11-25 RX ADMIN — LACTULOSE 30 ML: 10 SOLUTION ORAL; RECTAL at 17:15

## 2024-11-25 RX ADMIN — LACTULOSE 30 ML: 10 SOLUTION ORAL; RECTAL at 05:28

## 2024-11-25 RX ADMIN — GABAPENTIN 400 MG: 400 CAPSULE ORAL at 17:15

## 2024-11-25 RX ADMIN — OXYCODONE HYDROCHLORIDE 10 MG: 10 TABLET ORAL at 17:15

## 2024-11-25 ASSESSMENT — PATIENT HEALTH QUESTIONNAIRE - PHQ9
8. MOVING OR SPEAKING SO SLOWLY THAT OTHER PEOPLE COULD HAVE NOTICED. OR THE OPPOSITE, BEING SO FIGETY OR RESTLESS THAT YOU HAVE BEEN MOVING AROUND A LOT MORE THAN USUAL: NOT AT ALL
3. TROUBLE FALLING OR STAYING ASLEEP OR SLEEPING TOO MUCH: NOT AT ALL
2. FEELING DOWN, DEPRESSED, IRRITABLE, OR HOPELESS: SEVERAL DAYS
SUM OF ALL RESPONSES TO PHQ9 QUESTIONS 1 AND 2: 1
6. FEELING BAD ABOUT YOURSELF - OR THAT YOU ARE A FAILURE OR HAVE LET YOURSELF OR YOUR FAMILY DOWN: NOT AL ALL
4. FEELING TIRED OR HAVING LITTLE ENERGY: SEVERAL DAYS
7. TROUBLE CONCENTRATING ON THINGS, SUCH AS READING THE NEWSPAPER OR WATCHING TELEVISION: NOT AT ALL
SUM OF ALL RESPONSES TO PHQ QUESTIONS 1-9: 2
9. THOUGHTS THAT YOU WOULD BE BETTER OFF DEAD, OR OF HURTING YOURSELF: NOT AT ALL
5. POOR APPETITE OR OVEREATING: NOT AT ALL
1. LITTLE INTEREST OR PLEASURE IN DOING THINGS: NOT AT ALL

## 2024-11-25 ASSESSMENT — LIFESTYLE VARIABLES
TOTAL SCORE: 0
AVERAGE NUMBER OF DAYS PER WEEK YOU HAVE A DRINK CONTAINING ALCOHOL: 0
CONSUMPTION TOTAL: NEGATIVE
TOTAL SCORE: 0
EVER HAD A DRINK FIRST THING IN THE MORNING TO STEADY YOUR NERVES TO GET RID OF A HANGOVER: NO
HAVE YOU EVER FELT YOU SHOULD CUT DOWN ON YOUR DRINKING: NO
TOTAL SCORE: 0
ALCOHOL_USE: NO
HOW MANY TIMES IN THE PAST YEAR HAVE YOU HAD 5 OR MORE DRINKS IN A DAY: 0
EVER FELT BAD OR GUILTY ABOUT YOUR DRINKING: NO
HAVE PEOPLE ANNOYED YOU BY CRITICIZING YOUR DRINKING: NO
ON A TYPICAL DAY WHEN YOU DRINK ALCOHOL HOW MANY DRINKS DO YOU HAVE: 0

## 2024-11-25 ASSESSMENT — ENCOUNTER SYMPTOMS
CONSTIPATION: 0
SHORTNESS OF BREATH: 0
WEAKNESS: 1
ABDOMINAL PAIN: 1
CHILLS: 0
FEVER: 0

## 2024-11-25 ASSESSMENT — SOCIAL DETERMINANTS OF HEALTH (SDOH)
WITHIN THE LAST YEAR, HAVE YOU BEEN HUMILIATED OR EMOTIONALLY ABUSED IN OTHER WAYS BY YOUR PARTNER OR EX-PARTNER?: NO
WITHIN THE PAST 12 MONTHS, THE FOOD YOU BOUGHT JUST DIDN'T LAST AND YOU DIDN'T HAVE MONEY TO GET MORE: NEVER TRUE
WITHIN THE PAST 12 MONTHS, YOU WORRIED THAT YOUR FOOD WOULD RUN OUT BEFORE YOU GOT THE MONEY TO BUY MORE: NEVER TRUE
WITHIN THE LAST YEAR, HAVE YOU BEEN KICKED, HIT, SLAPPED, OR OTHERWISE PHYSICALLY HURT BY YOUR PARTNER OR EX-PARTNER?: NO
IN THE PAST 12 MONTHS, HAS THE ELECTRIC, GAS, OIL, OR WATER COMPANY THREATENED TO SHUT OFF SERVICE IN YOUR HOME?: NO
WITHIN THE LAST YEAR, HAVE YOU BEEN AFRAID OF YOUR PARTNER OR EX-PARTNER?: NO
WITHIN THE LAST YEAR, HAVE TO BEEN RAPED OR FORCED TO HAVE ANY KIND OF SEXUAL ACTIVITY BY YOUR PARTNER OR EX-PARTNER?: NO

## 2024-11-25 ASSESSMENT — COGNITIVE AND FUNCTIONAL STATUS - GENERAL
TOILETING: A LITTLE
DRESSING REGULAR LOWER BODY CLOTHING: A LITTLE
WALKING IN HOSPITAL ROOM: TOTAL
MOVING FROM LYING ON BACK TO SITTING ON SIDE OF FLAT BED: A LITTLE
STANDING UP FROM CHAIR USING ARMS: A LOT
SUGGESTED CMS G CODE MODIFIER MOBILITY: CL
SUGGESTED CMS G CODE MODIFIER DAILY ACTIVITY: CJ
MOVING TO AND FROM BED TO CHAIR: A LOT
MOBILITY SCORE: 12
DAILY ACTIVITIY SCORE: 21
CLIMB 3 TO 5 STEPS WITH RAILING: TOTAL
HELP NEEDED FOR BATHING: A LITTLE
TURNING FROM BACK TO SIDE WHILE IN FLAT BAD: A LITTLE

## 2024-11-25 ASSESSMENT — PAIN DESCRIPTION - PAIN TYPE
TYPE: ACUTE PAIN

## 2024-11-25 ASSESSMENT — FIBROSIS 4 INDEX: FIB4 SCORE: 5.89

## 2024-11-25 NOTE — PROGRESS NOTES
Monitor summary: afib 62-91, MD --, QRS 0.09, QT --, with rare PVCs per strip from monitor room.

## 2024-11-25 NOTE — H&P (VIEW-ONLY)
Date of Consultation:  11/25/2024    Patient: : April Alciia  MRN: [id number]    Referring Physician:  Lian Sifuentes D.O.Attending     GI:Darian Gandhi M.D.     Reason for Consultation: Anemia, right side abdominal pain,?  Melena    History of Present Illness:   The patient is 64 years old female with medical history of cirrhosis from hepatitis C and possible fatty liver, the patient had GI consultant provider for liver disease follow-up in the past, the last EGD and colon was 2015 GI see office in the chart, this time, the GI team is consulted for possible melena and abdominal pain.    In 2015 likely the last EGD and colon the patient had in the past.  There was no record to show more EGD and colon recently.  The patient reports that she is trying to reestablish with GI consultant recently, waiting for the appointment.    Due to the symptom, CAT scan was done on November 24, minimal ascites, possible varices, no other major pathology from her GI.    Melena was noted yesterday however today the bowel movement was brown, which confirmed by the family member at the bedside.  No evidence of frequency at this time but yesterday the patient had multiple bowel movement.    Currently hemoglobin around 8.  Stable vitals at bedside, no evidence of acute abdomen.      Past Medical History:   Diagnosis Date    Septic shock due to Pseudomonas species (formerly [facility]) 10/30/2023    Hepatic encephalopathy (HCC) 12/27/2022    Type 2 diabetes mellitus (HCC) 12/27/2022    Abnormal finding of lung 12/27/2022    Protein-calorie malnutrition, severe (HCC) 08/01/2022    Hepatitis C infection 07/28/2022    Pancytopenia (HCC) 07/26/2022    Septic arthritis of knee, left (HCC) 07/19/2022    Septic arthritis of shoulder, left (HCC) 07/17/2022    Primary osteoarthritis of left knee 08/25/2020    Pneumonia 02/2020    Acute exacerbation of chronic obstructive pulmonary disease (COPD) (formerly [facility]) 01/27/2020    H/O: hysterectomy 12/05/2019    Right  leg DVT (HCC) 2018    acute, resolved    Prosthetic joint infection (HCC) 2018    Right knee after total knee    Dry eyes 11/16/2017    History of rheumatic fever 09/04/2017    Medication overuse headache 08/15/2017    Alcoholic cirrhosis (HCC)     Arthritis     OA in knees    ASTHMA     Atrial fibrillation (HCC)     Edentulous     upper and lower dentures    Emphysema of lung (HCC)     Heart burn     left knee    Hypertension     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HCC)     Recurrent, L knee, x4 with surgical washout    Mitral regurgitation     Seizure disorder ([facility])          Past Surgical History:   Procedure Laterality Date    KNEE AMPUTATION ABOVE Left 1/16/2024    Procedure: LEFT ABOVE KNEE AMPUTATION;  Surgeon: Obdulio Gray M.D.;  Location: [facility];  Service: Orthopedics    PB TOTAL KNEE ARTHROPLASTY Left 10/30/2023    Procedure: LEFT TOTAL KNEE ARTHROPLASTY EXPLANTATION, INSERTION OF ANTIBIOTIC SPACER, AND APPLICATION OF INCISIONAL WOUND VACUUM;  Surgeon: Wild Luz M.D.;  Location: [facility];  Service: Orthopedics    PB REVISE KNEE JOINT REPLACE,ALL PARTS Left 12/28/2022    Procedure: REVISION, TOTAL ARTHROPLASTY, KNEE, ALL COMPONENTS-LEFT;  Surgeon: Wild Luz M.D.;  Location: [facility];  Service: Orthopedics    IRRIGATION & DEBRIDEMENT GENERAL Left 12/28/2022    Procedure: IRRIGATION AND DEBRIDEMENT, WOUND;  Surgeon: Wild Luz M.D.;  Location: [facility];  Service: Orthopedics    PB REVISE KNEE JOINT REPLACE,ALL PARTS Left 7/19/2022    Procedure: REVISION, TOTAL ARTHROPLASTY, KNEE, ALL COMPONENTS - ANTIBIOTIC SPACER;  Surgeon: Wild Luz M.D.;  Location: [facility];  Service: Orthopedics    IRRIGATION & DEBRIDEMENT GENERAL Left 7/17/2022    Procedure: IRRIGATION AND DEBRIDEMENT, SHOULDER;  Surgeon: Obdulio Gray M.D.;  Location: [facility];  Service: Orthopedics    PB TOTAL KNEE ARTHROPLASTY Left 8/24/2020    Procedure:  ARTHROPLASTY, KNEE, TOTAL;  Surgeon: Víctor Faustin M.D.;  Location: SURGERY [facility];  Service: Orthopedics    KNEE ARTHROPLASTY TOTAL Right 2018    IRRIGATION & DEBRIDEMENT ORTHO Right 9/3/2017    Procedure: IRRIGATION & DEBRIDEMENT ORTHO, POLY EXCHANGE;  Surgeon: Jerome Russell M.D.;  Location: SURGERY [facility];  Service: Orthopedics    COLONOSCOPY WITH CLIPPING  10/28/2015    Procedure: COLONOSCOPY WITH CLIPPING;  Surgeon: Ruben Colon M.D.;  Location: ENDOSCOPY [facility];  Service:     COLONOSCOPY WITH SCLEROTHERAPY  10/28/2015    Procedure: COLONOSCOPY WITH SCLEROTHERAPY;  Surgeon: Ruben Colon M.D.;  Location: ENDOSCOPY [facility];  Service:     COLONOSCOPY WITH TATTOOING  10/28/2015    Procedure: COLONOSCOPY WITH TATTOOING;  Surgeon: Ruben Colon M.D.;  Location: ENDOSCOPY [facility];  Service:     GYN SURGERY      hysteroscoopy    GYN SURGERY      tubal ligation       Family History   Problem Relation Age of Onset    Diabetes Mother     Seizures Father     Heart Attack Father 45    Diabetes Brother     Alcohol abuse Brother     Dementia Brother     Diabetes Brother        Social History     Socioeconomic History    Marital status: Single    Number of children: 2    Highest education level: 11th grade   Occupational History    Occupation: CNA   Tobacco Use    Smoking status: Former     Current packs/day: 0.00     Types: Cigarettes     Quit date: 2016     Years since quittin.9    Smokeless tobacco: Former     Quit date: 2021    Tobacco comments:     a few a day when I can   Vaping Use    Vaping status: Never Used   Substance and Sexual Activity    Alcohol use: Not Currently     Comment: hx of AUD    Drug use: Not Currently    Sexual activity: Not Currently     Partners: Male     Birth control/protection: Post-Menopausal     Comment: Has boyfriend, not currently sexually active     Social Drivers of Health     Financial Resource Strain:  Low Risk  (1/28/2020)    Overall Financial Resource Strain (CARDIA)     Difficulty of Paying Living Expenses: Not hard at all   Food Insecurity: No Food Insecurity (11/24/2024)    Hunger Vital Sign     Worried About Running Out of Food in the Last Year: Never true     Ran Out of Food in the Last Year: Never true   Transportation Needs: No Transportation Needs (11/24/2024)    PRAPARE - Transportation     Lack of Transportation (Medical): No     Lack of Transportation (Non-Medical): No   Intimate Partner Violence: Not At Risk (11/24/2024)    Humiliation, Afraid, Rape, and Kick questionnaire     Fear of Current or Ex-Partner: No     Emotionally Abused: No     Physically Abused: No     Sexually Abused: No   Housing Stability: Low Risk  (11/24/2024)    Housing Stability Vital Sign     Unable to Pay for Housing in the Last Year: No     Number of Times Moved in the Last Year: 1     Homeless in the Last Year: No           Physical Exam:  Vitals:    11/25/24 0417 11/25/24 0527 11/25/24 0715 11/25/24 1014   BP: 119/78 105/58 124/72    Pulse: 79 80 74 80   Resp: 16  16 18   Temp: 37 °C (98.6 °F)  36.5 °C (97.7 °F)    TempSrc: Temporal  Tympanic    SpO2: 97%  95%    Weight: 96.2 kg (212 lb 1.3 oz)      Height:                 Labs:  Recent Labs     11/23/24 1857 11/24/24  0043 11/25/24  0302   WBC 4.1* 4.2* 3.7*   RBC 2.92* 2.94* 3.03*   HEMOGLOBIN 8.5* 8.6* 8.7*   HEMATOCRIT 26.9* 27.0* 27.5*   MCV 92.1 91.8 90.8   MCH 29.1 29.3 28.7   MCHC 31.6* 31.9* 31.6*   RDW 67.7* 68.1* 65.3*   PLATELETCT 82* 79* 79*   MPV 10.4 9.4 9.2     Recent Labs     11/23/24 1857 11/24/24  0043 11/25/24  0302   SODIUM 137 138 136   POTASSIUM 4.5 4.6 4.8   CHLORIDE 110 111 110   CO2 19* 17* 20   GLUCOSE 115* 103* 98   BUN 23* 23* 25*     Recent Labs     11/23/24  1857   INR 1.46*       Recent Labs     11/23/24  1857 11/24/24  0043 11/25/24  0302   ASTSGOT 21 20 23   ALTSGPT 9 11 10   TBILIRUBIN 0.9 0.8 0.7   ALKPHOSPHAT 272* 250* 230*   GLOBULIN  4.2* 4.3* 4.3*   INR 1.46*  --   --    AMMONIA 31  --  99*         Imaging:  CT-ABDOMEN-PELVIS W/O  Narrative: 11/24/2024 4:59 PM    HISTORY/REASON FOR EXAM:  Diffuse abdominal pain..    TECHNIQUE/EXAM DESCRIPTION:  CT scan of the abdomen and pelvis without contrast.    Noncontrast helical scanning was obtained from the diaphragmatic domes through the pubic symphysis.    Low dose optimization technique was utilized for this CT exam including automated exposure control and adjustment of the mA and/or kV according to patient size.    COMPARISON: 10/15/2024    FINDINGS:  Lower Chest: There is bibasilar atelectasis. There are small bilateral pleural effusions, left greater than right. Heart is enlarged.    Liver: The liver has a heterogeneous nodular appearance..    Spleen: Enlarged.    Pancreas: Unremarkable.    Gallbladder: No calcified stones.    Biliary: Nondilated.    Adrenal glands: Normal.    Kidneys: Unremarkable without hydronephrosis.    Bowel: No obstruction or acute inflammation.    Lymph nodes: There multiple borderline enlarged retroperitoneal lymph nodes..    Vasculature: There is atherosclerotic vascular calcification. There are gastroesophageal varices. There is recanalization of the umbilical vein with multiple mesenteric varices..    Peritoneum: There is small amount of ascites within the abdomen and pelvis. There is diffuse anasarca..    Musculoskeletal: No acute or destructive process.    Pelvis: There is some ascites..  Impression: 1.  No evidence of bowel obstruction or focal inflammatory change.    2.  Bibasilar atelectasis and small bilateral pleural effusions.    3.  Cirrhotic appearance of the liver with splenomegaly, gastroesophageal varices, recanalization of the umbilical vein with splenic varices and small amount of ascites consistent with portal hypertension.    4.  Anasarca.    5.  Cardiomegaly.  US-ABDOMEN LTD (SOFT TISSUE)  Narrative: 11/24/2024 2:56 PM    HISTORY/REASON FOR EXAM:   Ascites    TECHNIQUE/EXAM DESCRIPTION:  Limited abdominal ultrasound.    COMPARISON: None available.    FINDINGS:  Images are obtained the abdomen to evaluate for volume of ascites. There is only a small volume of ascites present.  Impression: Small amount of ascites present. Paracentesis not performed.            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    Due to possible melena, hemoglobin dropped, over varices on the CAT scan, GI team will offer upper GI scope on November 26 morning for possible bleeding control including banding.    The patient continue oral intake today.  Close monitor, regular lab, transfusion if needed per protocol.    The patient finished a conversation with me without difficulty, we will suggest daily evaluation by medicine team for mild encephalopathy.  Fluid management by the primary team.    Diagnosis: upper gastrointestinal bleeding.   Procedure: Esophagogastroduodenoscopy with bleeding control including banding.             This note was generated using voice recognition software which has a small chance of producing errors of grammar and possibly content. I have made every reasonable attempt to find and correct any obvious errors, but expect that some may not be found prior to finalization of this note.

## 2024-11-25 NOTE — PROGRESS NOTES
Hospital Medicine Daily Progress Note    Date of Service  11/24/2024    Chief Complaint  April Alicia is a 64 y.o. female admitted 11/23/2024 with abd pain    Hospital Course  64 y.o. female with extensive medical history including alcohol cirrhosis of the liver, chronic anemia, recent ESBL UTI, A-fib previously on Eliquis, GI bleeds, GERD, asthma, hypertension, HCV, type 2 diabetes, who presented 11/23/2024 with progressive abdominal pain.      Patient was in [facility] complaining of abdominal pain, ultrasound was performed negative for gallbladder concerns, did not show possible 3.3 cm abdominal aortic aneurysm and ascites.  Due to the ongoing abdominal pain that has been persistent for the past 2 weeks and history of cirrhosis she was sent to the ED.  Also reports nonbloody vomiting, has intermittent dizziness, also have been having some melanotic stools intermittently bloody.  CNA from facility in regards to her stool this morning notes they were not black, stools usually dark and tarry.  Abdominal pain is generalized, moderate to severe, generalized.  No fevers or chills headache or vision changes chest pain dyspnea cough dysuria or diarrhea.    Interval Problem Update  Seen patient at bedside  Patient is lethargic, drowsy  Reports abd pain.   Paracentesis ordered to rule out SBP  ? Dark stool, check occult blood stool  Monitoring H&H  Afib, rate controlled     I have discussed this patient's plan of care and discharge plan at IDT rounds today with Case Management, Nursing, Nursing leadership, and other members of the IDT team.    Consultants/Specialty  na    Code Status  Full Code    Disposition  The patient is not medically cleared for discharge to home or a post-acute facility.      I have placed the appropriate orders for post-discharge needs.    Review of Systems  Review of Systems   Constitutional:  Positive for malaise/fatigue.   Gastrointestinal:  Positive for abdominal pain and melena.    Neurological:  Positive for weakness.   All other systems reviewed and are negative.       Physical Exam  Temp:  [35.6 °C (96.1 °F)-36.8 °C (98.2 °F)] 36.6 °C (97.9 °F)  Pulse:  [] 70  Resp:  [13-20] 16  BP: (113-169)/(63-98) 132/98  SpO2:  [96 %-100 %] 96 %    Physical Exam  Vitals and nursing note reviewed.   Constitutional:       Appearance: Normal appearance. She is ill-appearing.   HENT:      Head: Normocephalic and atraumatic.      Nose: Nose normal.      Mouth/Throat:      Pharynx: Oropharynx is clear.   Eyes:      Extraocular Movements: Extraocular movements intact.      Conjunctiva/sclera: Conjunctivae normal.      Pupils: Pupils are equal, round, and reactive to light.   Cardiovascular:      Rate and Rhythm: Normal rate. Rhythm irregular.      Pulses: Normal pulses.      Heart sounds: Normal heart sounds.   Pulmonary:      Effort: Pulmonary effort is normal.      Breath sounds: Normal breath sounds.   Abdominal:      General: Bowel sounds are normal. There is distension.      Palpations: Abdomen is soft.      Tenderness: There is abdominal tenderness.   Musculoskeletal:         General: Normal range of motion.      Cervical back: Normal range of motion and neck supple.   Skin:     General: Skin is warm and dry.   Neurological:      General: No focal deficit present.      Mental Status: She is alert and oriented to person, place, and time. Mental status is at baseline.   Psychiatric:         Mood and Affect: Mood normal.         Behavior: Behavior normal.         Fluids    Intake/Output Summary (Last 24 hours) at 11/24/2024 1612  Last data filed at 11/24/2024 1302  Gross per 24 hour   Intake --   Output 1200 ml   Net -1200 ml        Laboratory  Recent Labs     11/23/24  1857 11/24/24  0043   WBC 4.1* 4.2*   RBC 2.92* 2.94*   HEMOGLOBIN 8.5* 8.6*   HEMATOCRIT 26.9* 27.0*   MCV 92.1 91.8   MCH 29.1 29.3   MCHC 31.6* 31.9*   RDW 67.7* 68.1*   PLATELETCT 82* 79*   MPV 10.4 9.4     Recent Labs      11/23/24 1857 11/24/24  0043   SODIUM 137 138   POTASSIUM 4.5 4.6   CHLORIDE 110 111   CO2 19* 17*   GLUCOSE 115* 103*   BUN 23* 23*   CREATININE 0.67 0.65   CALCIUM 7.9* 7.7*     Recent Labs     11/23/24 1857   INR 1.46*               Imaging  US-ABDOMEN LTD (SOFT TISSUE)   Final Result      Small amount of ascites present. Paracentesis not performed.      US-RUQ   Final Result      1. Cirrhotic appearing liver with perihepatic ascites.      2. There is a 6 mm gallbladder polyp.              Assessment/Plan  * Atrial fibrillation with RVR (HCC)- (present on admission)  Assessment & Plan  Presented in RVR, responded well to IV metoprolol.  Continue p.o. metoprolol  Monitor on telemetry.  Check magnesium.  Recently stopped Eliquis.  Keep magnesium greater than 2 potassium greater than 4    Advance care planning  Assessment & Plan  This is  65 yo female with multiple co-morbidities including alcohol cirrhosis of the liver, chronic anemia, recent ESBL UTI, A-fib previously on Eliquis, GI bleeds, GERD, asthma, hypertension, HCV, type 2 diabetes, who presented 11/23/2024 with progressive abdominal pain requiring further workup  Discussed goal of care and code status with patient. She has medical capacity. She confirms FULL CODE. ACP 16 mins    SBP (spontaneous bacterial peritonitis) (HCC)  Assessment & Plan  Paracentesis ordered to rule out, patient was started on ceftriaxone blood cultures were drawn.  Will continue ceftriaxone for now, follow cultures.    Bloody stool  Assessment & Plan  Unclear reports regarding hematochezia versus melena versus brown stool.  She is on p.o. iron outpatient, recently treated for GI bleed, Eliquis was stopped.  PPI  Occult blood stool    Narcotic dependence (HCC)- (present on admission)  Assessment & Plan  Continue p.o. regimen  Analgesia as needed, adjust regimen as indicated    Mild intermittent asthma without complication- (present on admission)  Assessment & Plan  None acute  exacerbation.  DuoNebs as needed.  Continuous pulse ox    Type 2 diabetes mellitus with diabetic neuropathy, with long-term current use of insulin (HCC)- (present on admission)  Assessment & Plan  Diabetic diet  SSI    Hepatic encephalopathy (HCC)- (present on admission)  Assessment & Plan  Trending ammonia  lactulose    GERD (gastroesophageal reflux disease)- (present on admission)  Assessment & Plan  Continue Protonix    Dyslipidemia- (present on admission)  Assessment & Plan  Continue statin    Paroxysmal atrial fibrillation- (present on admission)  Assessment & Plan  Recently stopped Eliquis, continue metoprolol, presented with RVR.  IV metoprolol on for RVR.  Monitor on telemetry.  Follow potassium magnesium and potassium greater than 4 magnesium greater than 2.    Hypertension- (present on admission)  Assessment & Plan  On metoprolol.  IV antihypertensives ordered with parameters.    Thrombocytopenia (HCC)- (present on admission)  Assessment & Plan  Secondary to cirrhosis, no active bleeding    Alcoholic cirrhosis of liver (HCC)- (present on admission)  Assessment & Plan  Presenting with abdominal pain, imaging shows small volume ascites, ERP did not feel there was enough fluid at this time for safe paracentesis, deferred.  Started on ceftriaxone in the ED, will continue for now for SBP.  Continue lactulose, rifaximin, spironolactone, Lasix         VTE prophylaxis: lovenox    I have performed a physical exam and reviewed and updated ROS and Plan today (11/24/2024). In review of yesterday's note (11/23/2024), there are no changes except as documented above.    Patient is has a high medical complexity, complex decision making and is at high risk for complication, morbidity, and mortality.  I spent 68 minutes, reviewing the chart, obtaining and/or reviewing separately obtained history. Performing a medically appropriate examination and evaluation.  Counseling and educating the patient. Ordering and reviewing  medications, tests, or procedures.  Documenting clinical information in EPIC. Independently interpreting results and communicating results to patient. Discussing future disposition of care with patient, RN and case management.  This does not include time spent on separately billable procedures/tests.

## 2024-11-25 NOTE — CARE PLAN
The patient is Stable - Low risk of patient condition declining or worsening    Shift Goals  Clinical Goals: Pain control, remain hemodynamically stable  Patient Goals: Go home  Family Goals: EDMUND    Problem: Pain - Standard  Goal: Alleviation of pain or a reduction in pain to the patient’s comfort goal  Outcome: Progressing     Problem: Fall Risk  Goal: Patient will remain free from falls  Outcome: Progressing     Problem: Communication  Goal: The ability to communicate needs accurately and effectively will improve  Outcome: Progressing     Problem: Mobility  Goal: Risk for activity intolerance will decrease  Outcome: Progressing

## 2024-11-25 NOTE — PROGRESS NOTES
4 Eyes Skin Assessment Completed by DANYEL Schmitz and TRUDY Leyva.    Head WDL  Ears Redness  Nose WDL  Mouth WDL  Neck WDL  Breast/Chest WDL  Shoulder Blades WDL  Spine WDL  (R) Arm/Elbow/Hand Redness and Bruising  (L) Arm/Elbow/Hand Redness and Bruising  Abdomen WDL  Groin Redness  Scrotum/Coccyx/Buttocks Redness and Blanching  (R) Leg Scar and Edema, Skin graft  (L) Leg BKA  (R) Heel/Foot/Toe Discoloration, Boggy, and Edema  (L) Heel/Foot/Toe BKA          Devices In Places Tele Box, pulse ox      Interventions In Place TAP System and Pillows    Possible Skin Injury No    Pictures Uploaded Into Epic N/A  Wound Consult Placed N/A  RN Wound Prevention Protocol Ordered No

## 2024-11-25 NOTE — CARE PLAN
The patient is Stable - Low risk of patient condition declining or worsening    Shift Goals  Clinical Goals: Hemodynamic stability; pain control  Patient Goals: feel better    Progress made toward(s) clinical / shift goals:    Problem: Pain - Standard  Goal: Alleviation of pain or a reduction in pain to the patient’s comfort goal  Outcome: Progressing  Note: Patients pain in her abdomen has ranged from 3-10 throughout this shift, patient has been medicated when indicated and patients pain goal was a 3 which was achieved after medicated was administered.      Problem: Mobility  Goal: Risk for activity intolerance will decrease  Outcome: Progressing  Note: Patient stated she was previously independent when transferring to wheelchair before admission, patient has been a SBA when pivoting to the chair or commode.        Patient is not progressing towards the following goals:

## 2024-11-25 NOTE — ASSESSMENT & PLAN NOTE
This is  65 yo female with multiple co-morbidities including alcohol cirrhosis of the liver, chronic anemia, recent ESBL UTI, A-fib previously on Eliquis, GI bleeds, GERD, asthma, hypertension, HCV, type 2 diabetes, who presented 11/23/2024 with progressive abdominal pain requiring further workup  Discussed goal of care and code status with patient. She has medical capacity. She confirms FULL CODE. ACP 16 mins

## 2024-11-25 NOTE — PROGRESS NOTES
Bedside report received from off going RN: Dorene. Pt transferred to floor via hospital bed. Assessment complete.     Fall Risk Score: MODERATE RISK  Fall risk interventions in place: Provide patient/family education based on risk assessment, Place fall precaution signage outside patient door, Utilize bed/chair fall alarm, and Bed alarm connected correctly  Bed type: Regular (Sincere Score less than 17 interventions in place)  Patient on cardiac monitor: Yes  IVF/IV medications: Not Applicable   Oxygen: Room Air  Bedside sitter: Not Applicable   Isolation: Not applicable

## 2024-11-25 NOTE — CONSULTS
Date of Consultation:  11/25/2024    Patient: : April Alicia  MRN: [id number]    Referring Physician:  Lian Sifuentes D.O.Attending     GI:Darian Gandhi M.D.     Reason for Consultation: Anemia, right side abdominal pain,?  Melena    History of Present Illness:   The patient is 64 years old female with medical history of cirrhosis from hepatitis C and possible fatty liver, the patient had GI consultant provider for liver disease follow-up in the past, the last EGD and colon was 2015 GI see office in the chart, this time, the GI team is consulted for possible melena and abdominal pain.    In 2015 likely the last EGD and colon the patient had in the past.  There was no record to show more EGD and colon recently.  The patient reports that she is trying to reestablish with GI consultant recently, waiting for the appointment.    Due to the symptom, CAT scan was done on November 24, minimal ascites, possible varices, no other major pathology from her GI.    Melena was noted yesterday however today the bowel movement was brown, which confirmed by the family member at the bedside.  No evidence of frequency at this time but yesterday the patient had multiple bowel movement.    Currently hemoglobin around 8.  Stable vitals at bedside, no evidence of acute abdomen.      Past Medical History:   Diagnosis Date    Septic shock due to Pseudomonas species ([facility]) 10/30/2023    Hepatic encephalopathy (HCC) 12/27/2022    Type 2 diabetes mellitus (HCC) 12/27/2022    Abnormal finding of lung 12/27/2022    Protein-calorie malnutrition, severe (HCC) 08/01/2022    Hepatitis C infection 07/28/2022    Pancytopenia (HCC) 07/26/2022    Septic arthritis of knee, left (HCC) 07/19/2022    Septic arthritis of shoulder, left (HCC) 07/17/2022    Primary osteoarthritis of left knee 08/25/2020    Pneumonia 02/2020    Acute exacerbation of chronic obstructive pulmonary disease (COPD) ([facility]) 01/27/2020    H/O: hysterectomy 12/05/2019    Right  leg DVT (HCC) 2018    acute, resolved    Prosthetic joint infection (HCC) 2018    Right knee after total knee    Dry eyes 11/16/2017    History of rheumatic fever 09/04/2017    Medication overuse headache 08/15/2017    Alcoholic cirrhosis (HCC)     Arthritis     OA in knees    ASTHMA     Atrial fibrillation (HCC)     Edentulous     upper and lower dentures    Emphysema of lung (HCC)     Heart burn     left knee    Hypertension     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HCC)     Recurrent, L knee, x4 with surgical washout    Mitral regurgitation     Seizure disorder ([facility])          Past Surgical History:   Procedure Laterality Date    KNEE AMPUTATION ABOVE Left 1/16/2024    Procedure: LEFT ABOVE KNEE AMPUTATION;  Surgeon: Obdulio Gray M.D.;  Location: [facility];  Service: Orthopedics    PB TOTAL KNEE ARTHROPLASTY Left 10/30/2023    Procedure: LEFT TOTAL KNEE ARTHROPLASTY EXPLANTATION, INSERTION OF ANTIBIOTIC SPACER, AND APPLICATION OF INCISIONAL WOUND VACUUM;  Surgeon: Wild Luz M.D.;  Location: [facility];  Service: Orthopedics    PB REVISE KNEE JOINT REPLACE,ALL PARTS Left 12/28/2022    Procedure: REVISION, TOTAL ARTHROPLASTY, KNEE, ALL COMPONENTS-LEFT;  Surgeon: Wild Luz M.D.;  Location: [facility];  Service: Orthopedics    IRRIGATION & DEBRIDEMENT GENERAL Left 12/28/2022    Procedure: IRRIGATION AND DEBRIDEMENT, WOUND;  Surgeon: Wild Luz M.D.;  Location: [facility];  Service: Orthopedics    PB REVISE KNEE JOINT REPLACE,ALL PARTS Left 7/19/2022    Procedure: REVISION, TOTAL ARTHROPLASTY, KNEE, ALL COMPONENTS - ANTIBIOTIC SPACER;  Surgeon: Wild Luz M.D.;  Location: [facility];  Service: Orthopedics    IRRIGATION & DEBRIDEMENT GENERAL Left 7/17/2022    Procedure: IRRIGATION AND DEBRIDEMENT, SHOULDER;  Surgeon: Obdulio Gray M.D.;  Location: [facility];  Service: Orthopedics    PB TOTAL KNEE ARTHROPLASTY Left 8/24/2020    Procedure:  ARTHROPLASTY, KNEE, TOTAL;  Surgeon: Víctor Faustin M.D.;  Location: SURGERY [facility];  Service: Orthopedics    KNEE ARTHROPLASTY TOTAL Right 2018    IRRIGATION & DEBRIDEMENT ORTHO Right 9/3/2017    Procedure: IRRIGATION & DEBRIDEMENT ORTHO, POLY EXCHANGE;  Surgeon: Jerome Russell M.D.;  Location: SURGERY [facility];  Service: Orthopedics    COLONOSCOPY WITH CLIPPING  10/28/2015    Procedure: COLONOSCOPY WITH CLIPPING;  Surgeon: Ruben Colon M.D.;  Location: ENDOSCOPY [facility];  Service:     COLONOSCOPY WITH SCLEROTHERAPY  10/28/2015    Procedure: COLONOSCOPY WITH SCLEROTHERAPY;  Surgeon: Ruben Colon M.D.;  Location: ENDOSCOPY [facility];  Service:     COLONOSCOPY WITH TATTOOING  10/28/2015    Procedure: COLONOSCOPY WITH TATTOOING;  Surgeon: Ruben Colon M.D.;  Location: ENDOSCOPY [facility];  Service:     GYN SURGERY      hysteroscoopy    GYN SURGERY      tubal ligation       Family History   Problem Relation Age of Onset    Diabetes Mother     Seizures Father     Heart Attack Father 45    Diabetes Brother     Alcohol abuse Brother     Dementia Brother     Diabetes Brother        Social History     Socioeconomic History    Marital status: Single    Number of children: 2    Highest education level: 11th grade   Occupational History    Occupation: CNA   Tobacco Use    Smoking status: Former     Current packs/day: 0.00     Types: Cigarettes     Quit date: 2016     Years since quittin.9    Smokeless tobacco: Former     Quit date: 2021    Tobacco comments:     a few a day when I can   Vaping Use    Vaping status: Never Used   Substance and Sexual Activity    Alcohol use: Not Currently     Comment: hx of AUD    Drug use: Not Currently    Sexual activity: Not Currently     Partners: Male     Birth control/protection: Post-Menopausal     Comment: Has boyfriend, not currently sexually active     Social Drivers of Health     Financial Resource Strain:  Low Risk  (1/28/2020)    Overall Financial Resource Strain (CARDIA)     Difficulty of Paying Living Expenses: Not hard at all   Food Insecurity: No Food Insecurity (11/24/2024)    Hunger Vital Sign     Worried About Running Out of Food in the Last Year: Never true     Ran Out of Food in the Last Year: Never true   Transportation Needs: No Transportation Needs (11/24/2024)    PRAPARE - Transportation     Lack of Transportation (Medical): No     Lack of Transportation (Non-Medical): No   Intimate Partner Violence: Not At Risk (11/24/2024)    Humiliation, Afraid, Rape, and Kick questionnaire     Fear of Current or Ex-Partner: No     Emotionally Abused: No     Physically Abused: No     Sexually Abused: No   Housing Stability: Low Risk  (11/24/2024)    Housing Stability Vital Sign     Unable to Pay for Housing in the Last Year: No     Number of Times Moved in the Last Year: 1     Homeless in the Last Year: No           Physical Exam:  Vitals:    11/25/24 0417 11/25/24 0527 11/25/24 0715 11/25/24 1014   BP: 119/78 105/58 124/72    Pulse: 79 80 74 80   Resp: 16  16 18   Temp: 37 °C (98.6 °F)  36.5 °C (97.7 °F)    TempSrc: Temporal  Tympanic    SpO2: 97%  95%    Weight: 96.2 kg (212 lb 1.3 oz)      Height:                 Labs:  Recent Labs     11/23/24 1857 11/24/24  0043 11/25/24  0302   WBC 4.1* 4.2* 3.7*   RBC 2.92* 2.94* 3.03*   HEMOGLOBIN 8.5* 8.6* 8.7*   HEMATOCRIT 26.9* 27.0* 27.5*   MCV 92.1 91.8 90.8   MCH 29.1 29.3 28.7   MCHC 31.6* 31.9* 31.6*   RDW 67.7* 68.1* 65.3*   PLATELETCT 82* 79* 79*   MPV 10.4 9.4 9.2     Recent Labs     11/23/24 1857 11/24/24  0043 11/25/24  0302   SODIUM 137 138 136   POTASSIUM 4.5 4.6 4.8   CHLORIDE 110 111 110   CO2 19* 17* 20   GLUCOSE 115* 103* 98   BUN 23* 23* 25*     Recent Labs     11/23/24  1857   INR 1.46*       Recent Labs     11/23/24  1857 11/24/24  0043 11/25/24  0302   ASTSGOT 21 20 23   ALTSGPT 9 11 10   TBILIRUBIN 0.9 0.8 0.7   ALKPHOSPHAT 272* 250* 230*   GLOBULIN  4.2* 4.3* 4.3*   INR 1.46*  --   --    AMMONIA 31  --  99*         Imaging:  CT-ABDOMEN-PELVIS W/O  Narrative: 11/24/2024 4:59 PM    HISTORY/REASON FOR EXAM:  Diffuse abdominal pain..    TECHNIQUE/EXAM DESCRIPTION:  CT scan of the abdomen and pelvis without contrast.    Noncontrast helical scanning was obtained from the diaphragmatic domes through the pubic symphysis.    Low dose optimization technique was utilized for this CT exam including automated exposure control and adjustment of the mA and/or kV according to patient size.    COMPARISON: 10/15/2024    FINDINGS:  Lower Chest: There is bibasilar atelectasis. There are small bilateral pleural effusions, left greater than right. Heart is enlarged.    Liver: The liver has a heterogeneous nodular appearance..    Spleen: Enlarged.    Pancreas: Unremarkable.    Gallbladder: No calcified stones.    Biliary: Nondilated.    Adrenal glands: Normal.    Kidneys: Unremarkable without hydronephrosis.    Bowel: No obstruction or acute inflammation.    Lymph nodes: There multiple borderline enlarged retroperitoneal lymph nodes..    Vasculature: There is atherosclerotic vascular calcification. There are gastroesophageal varices. There is recanalization of the umbilical vein with multiple mesenteric varices..    Peritoneum: There is small amount of ascites within the abdomen and pelvis. There is diffuse anasarca..    Musculoskeletal: No acute or destructive process.    Pelvis: There is some ascites..  Impression: 1.  No evidence of bowel obstruction or focal inflammatory change.    2.  Bibasilar atelectasis and small bilateral pleural effusions.    3.  Cirrhotic appearance of the liver with splenomegaly, gastroesophageal varices, recanalization of the umbilical vein with splenic varices and small amount of ascites consistent with portal hypertension.    4.  Anasarca.    5.  Cardiomegaly.  US-ABDOMEN LTD (SOFT TISSUE)  Narrative: 11/24/2024 2:56 PM    HISTORY/REASON FOR EXAM:   Ascites    TECHNIQUE/EXAM DESCRIPTION:  Limited abdominal ultrasound.    COMPARISON: None available.    FINDINGS:  Images are obtained the abdomen to evaluate for volume of ascites. There is only a small volume of ascites present.  Impression: Small amount of ascites present. Paracentesis not performed.            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    Due to possible melena, hemoglobin dropped, over varices on the CAT scan, GI team will offer upper GI scope on November 26 morning for possible bleeding control including banding.    The patient continue oral intake today.  Close monitor, regular lab, transfusion if needed per protocol.    The patient finished a conversation with me without difficulty, we will suggest daily evaluation by medicine team for mild encephalopathy.  Fluid management by the primary team.    Diagnosis: upper gastrointestinal bleeding.   Procedure: Esophagogastroduodenoscopy with bleeding control including banding.             This note was generated using voice recognition software which has a small chance of producing errors of grammar and possibly content. I have made every reasonable attempt to find and correct any obvious errors, but expect that some may not be found prior to finalization of this note.

## 2024-11-25 NOTE — PROGRESS NOTES
Hospital Medicine Daily Progress Note    Date of Service  11/25/2024    Chief Complaint  April Alicia is a 64 y.o. female admitted 11/23/2024 with abd pain    Hospital Course  64 y.o. female with extensive medical history including alcohol cirrhosis of the liver, chronic anemia, recent ESBL UTI, A-fib previously on Eliquis, GI bleeds, GERD, asthma, hypertension, HCV, type 2 diabetes, who presented 11/23/2024 with progressive abdominal pain.      Patient was in [facility] complaining of abdominal pain, ultrasound was performed negative for gallbladder concerns, did not show possible 3.3 cm abdominal aortic aneurysm and ascites.  Due to the ongoing abdominal pain that has been persistent for the past 2 weeks and history of cirrhosis she was sent to the ED.  Also reports nonbloody vomiting, has intermittent dizziness, also have been having some melanotic stools intermittently bloody.  CNA from facility in regards to her stool this morning notes they were not black, stools usually dark and tarry.  Abdominal pain is generalized, moderate to severe, generalized.  No fevers or chills headache or vision changes chest pain dyspnea cough dysuria or diarrhea.    Interval Problem Update  Seen patient at bedside  Patient is lethargic, drowsy  Reports abd pain.   Paracentesis ordered to rule out SBP  ? Dark stool, check occult blood stool  Monitoring H&H  Afib, rate controlled     11/25 vitals stable on room air  WBC 3.7, Hb 8.7, plt 79  Renal function stable  Ammonia 99  CT abdomen pelvis showed no evidence of bowel obstruction, cirrhotic appearance of the liver with splenomegaly, gastric varices and small amount of ascites  Paracentesis unable to be performed as ascites was too small of volume  Patient on lactulose TID, last BM 11/23.   Question of GI bleed on admit, hemoglobin stable however since varices visible on CT dc lovenox, increase omeprazole BID. Change BB to coreg to help with portal hypertension.   Discussed with GI who is planning EGD tomorrow.  Discussed with nursing, patient did have a large bowel movement this morning that was brown no melena or blood noted.  Will hold off on octreotide for now, if develops melena will start.  Patient reports she is feeling better today, mentation improving with lactulose    I have discussed this patient's plan of care and discharge plan at IDT rounds today with Case Management, Nursing, Nursing leadership, and other members of the IDT team.    Consultants/Specialty  Gastroenterology    Code Status  Full Code    Disposition  The patient is not medically cleared for discharge to home or a post-acute facility.  Anticipate discharge to: skilled nursing facility    I have placed the appropriate orders for post-discharge needs.    Review of Systems  Review of Systems   Constitutional:  Positive for malaise/fatigue. Negative for chills and fever.   Respiratory:  Negative for shortness of breath.    Cardiovascular:  Negative for chest pain.   Gastrointestinal:  Positive for abdominal pain. Negative for constipation and melena.   Neurological:  Positive for weakness.   All other systems reviewed and are negative.       Physical Exam  Temp:  [35.6 °C (96.1 °F)-37 °C (98.6 °F)] 36.9 °C (98.4 °F)  Pulse:  [64-80] 64  Resp:  [12-18] 18  BP: (105-169)/(58-98) 128/76  SpO2:  [95 %-99 %] 95 %    Physical Exam  Vitals and nursing note reviewed.   Constitutional:       General: She is not in acute distress.     Appearance: She is ill-appearing.   HENT:      Head: Normocephalic and atraumatic.   Eyes:      Conjunctiva/sclera: Conjunctivae normal.   Cardiovascular:      Rate and Rhythm: Normal rate. Rhythm irregular.      Pulses: Normal pulses.      Heart sounds: Normal heart sounds.   Pulmonary:      Effort: Pulmonary effort is normal.      Breath sounds: Normal breath sounds.   Abdominal:      General: There is distension (mild).      Palpations: Abdomen is soft.      Tenderness: There is  no abdominal tenderness.   Musculoskeletal:         General: Normal range of motion.      Cervical back: Normal range of motion and neck supple.      Right lower leg: No edema.      Left lower leg: No edema.   Skin:     General: Skin is warm and dry.   Neurological:      General: No focal deficit present.      Mental Status: She is alert and oriented to person, place, and time. Mental status is at baseline.      Motor: Weakness present.   Psychiatric:         Mood and Affect: Mood normal.         Behavior: Behavior normal.         Fluids    Intake/Output Summary (Last 24 hours) at 11/25/2024 1228  Last data filed at 11/25/2024 1014  Gross per 24 hour   Intake 240 ml   Output 2750 ml   Net -2510 ml        Laboratory  Recent Labs     11/23/24 1857 11/24/24  0043 11/25/24  0302   WBC 4.1* 4.2* 3.7*   RBC 2.92* 2.94* 3.03*   HEMOGLOBIN 8.5* 8.6* 8.7*   HEMATOCRIT 26.9* 27.0* 27.5*   MCV 92.1 91.8 90.8   MCH 29.1 29.3 28.7   MCHC 31.6* 31.9* 31.6*   RDW 67.7* 68.1* 65.3*   PLATELETCT 82* 79* 79*   MPV 10.4 9.4 9.2     Recent Labs     11/23/24 1857 11/24/24  0043 11/25/24  0302   SODIUM 137 138 136   POTASSIUM 4.5 4.6 4.8   CHLORIDE 110 111 110   CO2 19* 17* 20   GLUCOSE 115* 103* 98   BUN 23* 23* 25*   CREATININE 0.67 0.65 0.75   CALCIUM 7.9* 7.7* 8.0*     Recent Labs     11/23/24 1857   INR 1.46*               Imaging  CT-ABDOMEN-PELVIS W/O   Final Result      1.  No evidence of bowel obstruction or focal inflammatory change.      2.  Bibasilar atelectasis and small bilateral pleural effusions.      3.  Cirrhotic appearance of the liver with splenomegaly, gastroesophageal varices, recanalization of the umbilical vein with splenic varices and small amount of ascites consistent with portal hypertension.      4.  Anasarca.      5.  Cardiomegaly.      US-ABDOMEN LTD (SOFT TISSUE)   Final Result      Small amount of ascites present. Paracentesis not performed.      US-RUQ   Final Result      1. Cirrhotic appearing liver  "with perihepatic ascites.      2. There is a 6 mm gallbladder polyp.              Assessment/Plan  * Atrial fibrillation with RVR (HCC)- (present on admission)  Assessment & Plan  Presented in RVR, responded well to IV metoprolol.  Continue p.o. metoprolol  Monitor on telemetry.  Check magnesium.  Recently stopped Eliquis.  Keep magnesium greater than 2 potassium greater than 4    Portal hypertension (HCC)  Assessment & Plan  Changed to Coreg, increase as tolerated    Advance care planning  Assessment & Plan  This is  63 yo female with multiple co-morbidities including alcohol cirrhosis of the liver, chronic anemia, recent ESBL UTI, A-fib previously on Eliquis, GI bleeds, GERD, asthma, hypertension, HCV, type 2 diabetes, who presented 11/23/2024 with progressive abdominal pain requiring further workup  Discussed goal of care and code status with patient. She has medical capacity. She confirms FULL CODE. ACP 16 mins    SBP (spontaneous bacterial peritonitis) (HCC)  Assessment & Plan  Paracentesis ordered to rule out, patient was started on ceftriaxone blood cultures were drawn.  Will continue ceftriaxone for now, follow cultures.    Bloody stool  Assessment & Plan  Unclear reports regarding hematochezia versus melena versus brown stool.  She is on p.o. iron outpatient, Eliquis was stopped.  Per last DC summary \"I discussed with her the risks and benefits of discontinuing her Eliquis due to the drop in hemoglobin and risk for further bleeding. Discussed with patient that her risk for stroke does increase if she were to go off of her Eliquis. Patient is in agreement of stopping her Eliquis due to her increased risk of bleeding and current drop in hemoglobin. She states that she understands the risks and she is okay with the change.\"  DC Lovenox  Omeprazole twice daily  GI consulted as patient has varices on CT, plan for EGD tomorrow  -N.p.o. after midnight        Narcotic dependence (HCC)- (present on " admission)  Assessment & Plan  Continue p.o. regimen  Analgesia as needed, adjust regimen as indicated    Mild intermittent asthma without complication- (present on admission)  Assessment & Plan  None acute exacerbation.  DuoNebs as needed.  Continuous pulse ox    Type 2 diabetes mellitus with diabetic neuropathy, with long-term current use of insulin (HCC)- (present on admission)  Assessment & Plan  Diabetic diet  SSI    Hepatic encephalopathy (HCC)- (present on admission)  Assessment & Plan  Trending ammonia  lactulose    GERD (gastroesophageal reflux disease)- (present on admission)  Assessment & Plan  Continue Protonix    Dyslipidemia- (present on admission)  Assessment & Plan  Continue statin    Paroxysmal atrial fibrillation- (present on admission)  Assessment & Plan  Recently stopped Eliquis, presented with RVR.  Change oral metoprolol to Coreg for both A-fib and portal hypertension  IV metoprolol on for RVR.  Monitor on telemetry.  Follow potassium magnesium and potassium greater than 4 magnesium greater than 2.    Hypertension- (present on admission)  Assessment & Plan  On metoprolol.  IV antihypertensives ordered with parameters.    Thrombocytopenia (HCC)- (present on admission)  Assessment & Plan  Secondary to cirrhosis, no active bleeding    Alcoholic cirrhosis of liver (HCC)- (present on admission)  Assessment & Plan  Presenting with abdominal pain, imaging shows small volume ascites, ERP did not feel there was enough fluid at this time for safe paracentesis, deferred.  Started on ceftriaxone in the ED, will continue for now for SBP.  Continue lactulose, rifaximin, spironolactone, Lasix         VTE prophylaxis: SCDs/TEDs    I have performed a physical exam and reviewed and updated ROS and Plan today (11/25/2024). In review of yesterday's note (11/24/2024), there are no changes except as documented above.

## 2024-11-25 NOTE — PROGRESS NOTES
Monitor Summary  Rhythm: Afib  Rate: 67-79  Ectopy: PVC, 2.1s pause  Measurements: -/.06/-  ---12 hr Chart Review---

## 2024-11-25 NOTE — PROGRESS NOTES
Bedside report received from off going RN/tech: Ainsley, assumed care of patient.     Fall Risk Score: MODERATE RISK  Fall risk interventions in place: Provide patient/family education based on risk assessment, Educate patient/family to call staff for assistance when getting out of bed, Place fall precaution signage outside patient door, Place patient in room close to nursing station, Utilize bed/chair fall alarm, and Bed alarm connected correctly  Bed type: Regular (Sincere Score less than 17 interventions in place)  Patient on cardiac monitor: Yes  IVF/IV medications: Not Applicable   Oxygen: Room Air  Bedside sitter: Not Applicable   Isolation: Not applicable

## 2024-11-26 ENCOUNTER — ANESTHESIA (OUTPATIENT)
Dept: SURGERY | Facility: MEDICAL CENTER | Age: 64
End: 2024-11-26
Payer: MEDICAID

## 2024-11-26 ENCOUNTER — ANESTHESIA EVENT (OUTPATIENT)
Dept: SURGERY | Facility: MEDICAL CENTER | Age: 64
End: 2024-11-26
Payer: MEDICAID

## 2024-11-26 PROBLEM — R56.9 SEIZURES (HCC): Status: ACTIVE | Noted: 2024-11-26

## 2024-11-26 PROBLEM — M19.90 ARTHRITIS: Status: ACTIVE | Noted: 2024-01-02

## 2024-11-26 LAB
ALBUMIN SERPL BCP-MCNC: 2.5 G/DL (ref 3.2–4.9)
ALBUMIN/GLOB SERPL: 0.6 G/DL
ALP SERPL-CCNC: 223 U/L (ref 30–99)
ALT SERPL-CCNC: 7 U/L (ref 2–50)
ANION GAP SERPL CALC-SCNC: 8 MMOL/L (ref 7–16)
AST SERPL-CCNC: 17 U/L (ref 12–45)
BILIRUB SERPL-MCNC: 0.5 MG/DL (ref 0.1–1.5)
BUN SERPL-MCNC: 31 MG/DL (ref 8–22)
CALCIUM ALBUM COR SERPL-MCNC: 8.9 MG/DL (ref 8.5–10.5)
CALCIUM SERPL-MCNC: 7.7 MG/DL (ref 8.5–10.5)
CHLORIDE SERPL-SCNC: 112 MMOL/L (ref 96–112)
CO2 SERPL-SCNC: 19 MMOL/L (ref 20–33)
CREAT SERPL-MCNC: 0.89 MG/DL (ref 0.5–1.4)
ERYTHROCYTE [DISTWIDTH] IN BLOOD BY AUTOMATED COUNT: 67.7 FL (ref 35.9–50)
GFR SERPLBLD CREATININE-BSD FMLA CKD-EPI: 72 ML/MIN/1.73 M 2
GLOBULIN SER CALC-MCNC: 4.2 G/DL (ref 1.9–3.5)
GLUCOSE BLD STRIP.AUTO-MCNC: 105 MG/DL (ref 65–99)
GLUCOSE SERPL-MCNC: 126 MG/DL (ref 65–99)
HCT VFR BLD AUTO: 26.8 % (ref 37–47)
HCT VFR BLD AUTO: 29.2 % (ref 37–47)
HGB BLD-MCNC: 8.5 G/DL (ref 12–16)
HGB BLD-MCNC: 9 G/DL (ref 12–16)
INR PPP: 1.54 (ref 0.87–1.13)
MCH RBC QN AUTO: 29.3 PG (ref 27–33)
MCHC RBC AUTO-ENTMCNC: 31.7 G/DL (ref 32.2–35.5)
MCV RBC AUTO: 92.4 FL (ref 81.4–97.8)
PLATELET # BLD AUTO: 82 K/UL (ref 164–446)
PLATELETS.RETICULATED NFR BLD AUTO: 1.5 % (ref 0.6–13.1)
PMV BLD AUTO: 9.6 FL (ref 9–12.9)
POTASSIUM SERPL-SCNC: 5.1 MMOL/L (ref 3.6–5.5)
PROT SERPL-MCNC: 6.7 G/DL (ref 6–8.2)
PROTHROMBIN TIME: 18.5 SEC (ref 12–14.6)
RBC # BLD AUTO: 2.9 M/UL (ref 4.2–5.4)
SODIUM SERPL-SCNC: 139 MMOL/L (ref 135–145)
WBC # BLD AUTO: 3.7 K/UL (ref 4.8–10.8)

## 2024-11-26 PROCEDURE — 770020 HCHG ROOM/CARE - TELE (206)

## 2024-11-26 PROCEDURE — 700102 HCHG RX REV CODE 250 W/ 637 OVERRIDE(OP)

## 2024-11-26 PROCEDURE — 700102 HCHG RX REV CODE 250 W/ 637 OVERRIDE(OP): Performed by: INTERNAL MEDICINE

## 2024-11-26 PROCEDURE — 85027 COMPLETE CBC AUTOMATED: CPT

## 2024-11-26 PROCEDURE — 82962 GLUCOSE BLOOD TEST: CPT

## 2024-11-26 PROCEDURE — 80053 COMPREHEN METABOLIC PANEL: CPT

## 2024-11-26 PROCEDURE — 700111 HCHG RX REV CODE 636 W/ 250 OVERRIDE (IP): Performed by: STUDENT IN AN ORGANIZED HEALTH CARE EDUCATION/TRAINING PROGRAM

## 2024-11-26 PROCEDURE — 36415 COLL VENOUS BLD VENIPUNCTURE: CPT

## 2024-11-26 PROCEDURE — 700102 HCHG RX REV CODE 250 W/ 637 OVERRIDE(OP): Performed by: STUDENT IN AN ORGANIZED HEALTH CARE EDUCATION/TRAINING PROGRAM

## 2024-11-26 PROCEDURE — 700105 HCHG RX REV CODE 258: Performed by: STUDENT IN AN ORGANIZED HEALTH CARE EDUCATION/TRAINING PROGRAM

## 2024-11-26 PROCEDURE — 85055 RETICULATED PLATELET ASSAY: CPT

## 2024-11-26 PROCEDURE — 85610 PROTHROMBIN TIME: CPT

## 2024-11-26 PROCEDURE — 99233 SBSQ HOSP IP/OBS HIGH 50: CPT | Performed by: HOSPITALIST

## 2024-11-26 PROCEDURE — A9270 NON-COVERED ITEM OR SERVICE: HCPCS

## 2024-11-26 PROCEDURE — 85014 HEMATOCRIT: CPT

## 2024-11-26 PROCEDURE — A9270 NON-COVERED ITEM OR SERVICE: HCPCS | Performed by: HOSPITALIST

## 2024-11-26 PROCEDURE — 85018 HEMOGLOBIN: CPT

## 2024-11-26 PROCEDURE — 700102 HCHG RX REV CODE 250 W/ 637 OVERRIDE(OP): Performed by: HOSPITALIST

## 2024-11-26 PROCEDURE — A9270 NON-COVERED ITEM OR SERVICE: HCPCS | Performed by: STUDENT IN AN ORGANIZED HEALTH CARE EDUCATION/TRAINING PROGRAM

## 2024-11-26 PROCEDURE — A9270 NON-COVERED ITEM OR SERVICE: HCPCS | Performed by: INTERNAL MEDICINE

## 2024-11-26 RX ORDER — NYSTATIN 100000 [USP'U]/G
POWDER TOPICAL 2 TIMES DAILY
Status: DISCONTINUED | OUTPATIENT
Start: 2024-11-26 | End: 2024-11-28 | Stop reason: HOSPADM

## 2024-11-26 RX ADMIN — FUROSEMIDE 40 MG: 40 TABLET ORAL at 04:36

## 2024-11-26 RX ADMIN — OXYCODONE HYDROCHLORIDE 10 MG: 10 TABLET ORAL at 15:23

## 2024-11-26 RX ADMIN — OXYCODONE HYDROCHLORIDE 10 MG: 10 TABLET ORAL at 19:34

## 2024-11-26 RX ADMIN — CARVEDILOL 3.12 MG: 3.12 TABLET, FILM COATED ORAL at 17:00

## 2024-11-26 RX ADMIN — LACTULOSE 30 ML: 10 SOLUTION ORAL; RECTAL at 04:36

## 2024-11-26 RX ADMIN — OXYCODONE HYDROCHLORIDE 10 MG: 10 TABLET ORAL at 04:36

## 2024-11-26 RX ADMIN — LACTULOSE 30 ML: 10 SOLUTION ORAL; RECTAL at 12:00

## 2024-11-26 RX ADMIN — CEFTRIAXONE SODIUM 2000 MG: 10 INJECTION, POWDER, FOR SOLUTION INTRAVENOUS at 17:06

## 2024-11-26 RX ADMIN — GABAPENTIN 400 MG: 400 CAPSULE ORAL at 04:36

## 2024-11-26 RX ADMIN — OXYCODONE HYDROCHLORIDE 10 MG: 10 TABLET ORAL at 09:52

## 2024-11-26 RX ADMIN — OMEPRAZOLE 20 MG: 20 CAPSULE, DELAYED RELEASE ORAL at 04:36

## 2024-11-26 RX ADMIN — SPIRONOLACTONE 50 MG: 25 TABLET ORAL at 04:36

## 2024-11-26 RX ADMIN — OXYCODONE HYDROCHLORIDE 10 MG: 10 TABLET ORAL at 00:31

## 2024-11-26 RX ADMIN — ATORVASTATIN CALCIUM 20 MG: 20 TABLET, FILM COATED ORAL at 19:35

## 2024-11-26 RX ADMIN — NYSTATIN: 100000 POWDER TOPICAL at 17:00

## 2024-11-26 RX ADMIN — OMEPRAZOLE 20 MG: 20 CAPSULE, DELAYED RELEASE ORAL at 17:00

## 2024-11-26 RX ADMIN — GABAPENTIN 400 MG: 400 CAPSULE ORAL at 17:00

## 2024-11-26 RX ADMIN — LACTULOSE 30 ML: 10 SOLUTION ORAL; RECTAL at 17:00

## 2024-11-26 ASSESSMENT — FIBROSIS 4 INDEX: FIB4 SCORE: 5.01

## 2024-11-26 ASSESSMENT — COGNITIVE AND FUNCTIONAL STATUS - GENERAL
CLIMB 3 TO 5 STEPS WITH RAILING: TOTAL
STANDING UP FROM CHAIR USING ARMS: A LOT
HELP NEEDED FOR BATHING: A LITTLE
TOILETING: A LITTLE
DRESSING REGULAR LOWER BODY CLOTHING: A LITTLE
MOVING FROM LYING ON BACK TO SITTING ON SIDE OF FLAT BED: A LITTLE
DAILY ACTIVITIY SCORE: 21
SUGGESTED CMS G CODE MODIFIER MOBILITY: CL
TURNING FROM BACK TO SIDE WHILE IN FLAT BAD: A LITTLE
SUGGESTED CMS G CODE MODIFIER DAILY ACTIVITY: CJ
MOVING TO AND FROM BED TO CHAIR: A LOT
MOBILITY SCORE: 12
WALKING IN HOSPITAL ROOM: TOTAL

## 2024-11-26 ASSESSMENT — ENCOUNTER SYMPTOMS
COUGH: 0
CHILLS: 0
SHORTNESS OF BREATH: 0
MYALGIAS: 0
DIZZINESS: 0
CONSTIPATION: 0
NERVOUS/ANXIOUS: 0
ABDOMINAL PAIN: 1
INSOMNIA: 0
FEVER: 0
SORE THROAT: 0
WEAKNESS: 1

## 2024-11-26 ASSESSMENT — PAIN DESCRIPTION - PAIN TYPE
TYPE: CHRONIC PAIN
TYPE: CHRONIC PAIN
TYPE: ACUTE PAIN;CHRONIC PAIN
TYPE: CHRONIC PAIN
TYPE: CHRONIC PAIN

## 2024-11-26 NOTE — THERAPY
Physical Therapy Contact Note    PT consult received and acknowledged. Patient out of room for EGD. Will re attempt PT evaluation as able and appropriate.     Kiarra Calix, PT, DPT  [phone]

## 2024-11-26 NOTE — DISCHARGE PLANNING
Case Management Discharge Planning    Admission Date: 11/23/2024  GMLOS: 4.5  ALOS: 1    6-Clicks ADL Score: 21  6-Clicks Mobility Score: 12  PT and/or OT Eval ordered: Yes  Post-acute Referrals Ordered: Yes  Post-acute Choice Obtained: Yes  Has referral(s) been sent to post-acute provider:  Yes      Anticipated Discharge Dispo: Discharge Disposition: D/T to SNF with Medicare cert in anticipation of skilled care (03)  Discharge Address: [address] [place] [address]  Discharge Contact Phone Number: [phone]    DME Needed: No    Action(s) Taken: Chart reviewed, pt discussed in IDT rounds. She is pending an EGD tomorrow. She will return to [facility] once medically cleared where she is a long term resident. Patient signed signed choice for [facility] and faxed to DPA.    Escalations Completed: None    Medically Clear: No    Next Steps: Follow for medical clearance to set up return to [facility]    Barriers to Discharge: Medical clearance    Is the patient up for discharge tomorrow: No

## 2024-11-26 NOTE — CARE PLAN
The patient is Watcher - Medium risk of patient condition declining or worsening    Shift Goals  Clinical Goals: NPO at midnight for endoscopy  Patient Goals: comfort, rest  Family Goals: gisele    Progress made toward(s) clinical / shift goals:    Problem: Pain - Standard  Goal: Alleviation of pain or a reduction in pain to the patient’s comfort goal  Outcome: Progressing  Note: Pt assessed for pain regularly and medicated PRN per MAR.      Problem: Knowledge Deficit - Standard  Goal: Patient and family/care givers will demonstrate understanding of plan of care, disease process/condition, diagnostic tests and medications  Outcome: Progressing  Note: White board updated with POC and care team information during bedside report.     Problem: Fall Risk  Goal: Patient will remain free from falls  Outcome: Progressing  Note: Fall precautions in place. Bed in lowest position. Non-skid socks in place. Personal possessions within reach. Mobility sign on door. Bed-alarm on. Call light within reach. Pt educated regarding fall prevention and states understanding.      Problem: Skin Integrity  Goal: Skin integrity is maintained or improved  Outcome: Progressing  Note: Pt prompted to reposition every two hours. Incontinence care provided and barrier paste applied as needed.        Patient is not progressing towards the following goals:

## 2024-11-26 NOTE — PROGRESS NOTES
Hospital Medicine Daily Progress Note    Date of Service  11/26/2024    Chief Complaint  April Alicia is a 64 y.o. female admitted 11/23/2024 with abd pain    Hospital Course  64 y.o. female with extensive medical history including alcohol cirrhosis of the liver, chronic anemia, recent ESBL UTI, A-fib previously on Eliquis, GI bleeds, GERD, asthma, hypertension, HCV, type 2 diabetes, who presented 11/23/2024 with progressive abdominal pain.      Patient was in [facility] complaining of abdominal pain, ultrasound was performed negative for gallbladder concerns, did not show possible 3.3 cm abdominal aortic aneurysm and ascites.  Due to the ongoing abdominal pain that has been persistent for the past 2 weeks and history of cirrhosis she was sent to the ED.  Also reports nonbloody vomiting, has intermittent dizziness, also have been having some melanotic stools intermittently bloody.  CNA from facility in regards to her stool this morning notes they were not black, stools usually dark and tarry.  Abdominal pain is generalized, moderate to severe, generalized.  No fevers or chills headache or vision changes chest pain dyspnea cough dysuria or diarrhea.    Interval Problem Update  Seen patient at bedside  Patient is lethargic, drowsy  Reports abd pain.   Paracentesis ordered to rule out SBP  ? Dark stool, check occult blood stool  Monitoring H&H  Afib, rate controlled     11/25 vitals stable on room air  WBC 3.7, Hb 8.7, plt 79  Renal function stable  Ammonia 99  CT abdomen pelvis showed no evidence of bowel obstruction, cirrhotic appearance of the liver with splenomegaly, gastric varices and small amount of ascites  Paracentesis unable to be performed as ascites was too small of volume  Patient on lactulose TID, last BM 11/23.   Question of GI bleed on admit, hemoglobin stable however since varices visible on CT dc lovenox, increase omeprazole BID. Change BB to coreg to help with portal hypertension.   Discussed with GI who is planning EGD tomorrow.  Discussed with nursing, patient did have a large bowel movement this morning that was brown no melena or blood noted.  Will hold off on octreotide for now, if develops melena will start.  Patient reports she is feeling better today, mentation improving with lactulose          11/26 patient is resting in bed, patient is new to me today, patient denies any fever chills, no nausea no vomiting denies any chest pain palpitations, EGD was canceled today because patient ate, patient will be n.p.o. after midnight, clear liquid diet for today, note from GI reviewed, discussed with bedside nurse charge nurse  pharmacist, continue IV ceftriaxone, continue diuretics..        I have discussed this patient's plan of care and discharge plan at IDT rounds today with Case Management, Nursing, Nursing leadership, and other members of the IDT team.    Consultants/Specialty  Gastroenterology    Code Status  Full Code    Disposition  The patient is not medically cleared for discharge to home or a post-acute facility.      I have placed the appropriate orders for post-discharge needs.    Review of Systems  Review of Systems   Constitutional:  Positive for malaise/fatigue. Negative for chills and fever.   Respiratory:  Negative for shortness of breath.    Cardiovascular:  Negative for chest pain.   Gastrointestinal:  Positive for abdominal pain. Negative for constipation and melena.   Neurological:  Positive for weakness.   All other systems reviewed and are negative.       Physical Exam  Temp:  [36.5 °C (97.7 °F)-37.1 °C (98.8 °F)] 36.5 °C (97.7 °F)  Pulse:  [69-98] 69  Resp:  [14-16] 16  BP: (103-134)/(64-86) 134/86  SpO2:  [92 %-99 %] 99 %    Physical Exam  Vitals and nursing note reviewed.   Constitutional:       General: She is not in acute distress.     Appearance: She is ill-appearing.   HENT:      Head: Normocephalic and atraumatic.   Eyes:      General: No scleral  icterus.  Cardiovascular:      Rate and Rhythm: Normal rate. Rhythm irregular.      Pulses: Normal pulses.      Heart sounds: Normal heart sounds.   Pulmonary:      Effort: Pulmonary effort is normal.      Breath sounds: Normal breath sounds.   Abdominal:      General: There is distension (mild).      Palpations: Abdomen is soft.      Tenderness: There is no abdominal tenderness. There is no guarding.   Musculoskeletal:         General: Normal range of motion.      Cervical back: Normal range of motion and neck supple.      Right lower leg: No edema.      Left lower leg: No edema.   Skin:     General: Skin is warm and dry.   Neurological:      General: No focal deficit present.      Mental Status: She is alert and oriented to person, place, and time. Mental status is at baseline.      Motor: Weakness present.   Psychiatric:         Mood and Affect: Mood normal.         Behavior: Behavior normal.         Fluids    Intake/Output Summary (Last 24 hours) at 11/26/2024 1523  Last data filed at 11/26/2024 1441  Gross per 24 hour   Intake 480 ml   Output 1500 ml   Net -1020 ml        Laboratory  Recent Labs     11/24/24  0043 11/25/24  0302 11/26/24  0043   WBC 4.2* 3.7* 3.7*   RBC 2.94* 3.03* 2.90*   HEMOGLOBIN 8.6* 8.7* 8.5*   HEMATOCRIT 27.0* 27.5* 26.8*   MCV 91.8 90.8 92.4   MCH 29.3 28.7 29.3   MCHC 31.9* 31.6* 31.7*   RDW 68.1* 65.3* 67.7*   PLATELETCT 79* 79* 82*   MPV 9.4 9.2 9.6     Recent Labs     11/24/24  0043 11/25/24  0302 11/26/24  0043   SODIUM 138 136 139   POTASSIUM 4.6 4.8 5.1   CHLORIDE 111 110 112   CO2 17* 20 19*   GLUCOSE 103* 98 126*   BUN 23* 25* 31*   CREATININE 0.65 0.75 0.89   CALCIUM 7.7* 8.0* 7.7*     Recent Labs     11/23/24  1857 11/26/24  0043   INR 1.46* 1.54*               Imaging  CT-ABDOMEN-PELVIS W/O   Final Result      1.  No evidence of bowel obstruction or focal inflammatory change.      2.  Bibasilar atelectasis and small bilateral pleural effusions.      3.  Cirrhotic appearance  "of the liver with splenomegaly, gastroesophageal varices, recanalization of the umbilical vein with splenic varices and small amount of ascites consistent with portal hypertension.      4.  Anasarca.      5.  Cardiomegaly.      US-ABDOMEN LTD (SOFT TISSUE)   Final Result      Small amount of ascites present. Paracentesis not performed.      US-RUQ   Final Result      1. Cirrhotic appearing liver with perihepatic ascites.      2. There is a 6 mm gallbladder polyp.              Assessment/Plan  * Atrial fibrillation with RVR (HCC)- (present on admission)  Assessment & Plan  Presented in RVR, responded well to IV metoprolol.  Continue p.o. metoprolol  Monitor on telemetry.  Check magnesium.  Recently stopped Eliquis.  Keep magnesium greater than 2 potassium greater than 4  Rate control    Portal hypertension (HCC)  Assessment & Plan  Changed to Coreg, increase as tolerated    Advance care planning  Assessment & Plan  This is  63 yo female with multiple co-morbidities including alcohol cirrhosis of the liver, chronic anemia, recent ESBL UTI, A-fib previously on Eliquis, GI bleeds, GERD, asthma, hypertension, HCV, type 2 diabetes, who presented 11/23/2024 with progressive abdominal pain requiring further workup  Discussed goal of care and code status with patient. She has medical capacity. She confirms FULL CODE. ACP 16 mins    SBP (spontaneous bacterial peritonitis) (HCC)  Assessment & Plan  Paracentesis ordered to rule out, patient was started on ceftriaxone blood cultures were drawn.  Will continue ceftriaxone for now, follow cultures.    Bloody stool  Assessment & Plan  Unclear reports regarding hematochezia versus melena versus brown stool.  She is on p.o. iron outpatient, Eliquis was stopped.  Per last DC summary \"I discussed with her the risks and benefits of discontinuing her Eliquis due to the drop in hemoglobin and risk for further bleeding. Discussed with patient that her risk for stroke does increase if she " "were to go off of her Eliquis. Patient is in agreement of stopping her Eliquis due to her increased risk of bleeding and current drop in hemoglobin. She states that she understands the risks and she is okay with the change.\"  DC Lovenox  Omeprazole twice daily  GI consulted as patient has varices on CT, plan for EGD tomorrow  -N.p.o. after midnight    Patient was going to get EGD today but unfortunately she ate this morning procedure was canceled and rescheduled for tomorrow patient will be kept n.p.o. after midnight clear liquid diet for now      Narcotic dependence (HCC)- (present on admission)  Assessment & Plan  Continue p.o. regimen  Analgesia as needed, adjust regimen as indicated    Mild intermittent asthma without complication- (present on admission)  Assessment & Plan  None acute exacerbation.  DuoNebs as needed.  Continuous pulse ox    Type 2 diabetes mellitus with diabetic neuropathy, with long-term current use of insulin (HCC)- (present on admission)  Assessment & Plan  Diabetic diet  SSI    Hepatic encephalopathy (HCC)- (present on admission)  Assessment & Plan  Trending ammonia  lactulose    GERD (gastroesophageal reflux disease)- (present on admission)  Assessment & Plan  Continue Protonix    Dyslipidemia- (present on admission)  Assessment & Plan  Continue statin    Paroxysmal atrial fibrillation- (present on admission)  Assessment & Plan  Recently stopped Eliquis, presented with RVR.  Change oral metoprolol to Coreg for both A-fib and portal hypertension  IV metoprolol on for RVR.  Monitor on telemetry.  Follow potassium magnesium and potassium greater than 4 magnesium greater than 2.    Holding Eliquis    Hypertension- (present on admission)  Assessment & Plan  On metoprolol.  IV antihypertensives ordered with parameters.    Thrombocytopenia (HCC)- (present on admission)  Assessment & Plan  Secondary to cirrhosis, no active bleeding    Alcoholic cirrhosis of liver (HCC)- (present on " admission)  Assessment & Plan  Presenting with abdominal pain, imaging shows small volume ascites, ERP did not feel there was enough fluid at this time for safe paracentesis, deferred.  Started on ceftriaxone in the ED, will continue for now for SBP.  Continue lactulose, rifaximin, spironolactone, Lasix.  Continue monitoring         VTE prophylaxis: SCDs/TEDs      I have reviewed CBC  I reviewed CMP  I reviewed ammonia level  I reviewed note from GI to her Gandhi  Patient is on IV ceftriaxone, monitoring for side effects include but limited to C. difficile infection, interstitial nephritis, neutropenia.  I have ordered blood work for in a.m.  Discussed with clinical pharmacist

## 2024-11-26 NOTE — ANESTHESIA PREPROCEDURE EVALUATION
Case: [id number] Date/Time: 11/26/24 0755    Procedure: GASTROSCOPY    Location: [facility] ROOM 26 / SURGERY SAME DAY [facility]    Surgeons: Darian Gandhi M.D.            Relevant Problems   ANESTHESIA   (positive) Sleep apnea      PULMONARY   (positive) Mild intermittent asthma without complication      NEURO   (positive) Migraine with aura and without status migrainosus, not intractable      CARDIAC   (positive) Atrial fibrillation with RVR (HCC)   (positive) Hypertension   (positive) Migraine with aura and without status migrainosus, not intractable   (positive) Mitral valve regurgitation   (positive) Murmur   (positive) Paroxysmal atrial fibrillation   (positive) Portal hypertension (HCC)      GI   (positive) GERD (gastroesophageal reflux disease)         (positive) Alcoholic cirrhosis of liver (HCC)      ENDO   (positive) Type 2 diabetes mellitus with diabetic neuropathy, with long-term current use of insulin (HCC)       Physical Exam    Airway   Mallampati: II  TM distance: >3 FB  Neck ROM: full       Cardiovascular - normal exam  Rhythm: regular  Rate: normal  (-) murmur     Dental - normal exam           Pulmonary - normal exam  Breath sounds clear to auscultation     Abdominal    Neurological - normal exam                   Anesthesia Plan    ASA 3   ASA physical status 3 criteria: other (comment)    Plan - general       Airway plan will be ETT    (A fib)      Induction: intravenous    Postoperative Plan: Postoperative administration of opioids is intended.    Pertinent diagnostic labs and testing reviewed    Informed Consent:    Anesthetic plan and risks discussed with patient.    Use of blood products discussed with: patient whom consented to blood products.

## 2024-11-26 NOTE — PROGRESS NOTES
Procedure cancelled, because the patient had some cookie for breakfast.     Will schedule the patient for tmr.

## 2024-11-26 NOTE — PROGRESS NOTES
Bedside report received from off going RN/tech: Mirna assumed care of patient.   Overnight Events: No overnight events per night RN. NPO since midnight for EGD scheduled this morning  A&O x 4  Oxygen: Room Air  SBP Ranged: low 100's -1teen's  Patient on cardiac monitor: Yes AFIB 80's-low 100's  IVF/IV medications: Not Applicable   Fall Risk Score: moderate fall risk   Fall risk interventions in place: Place yellow fall risk ID band on patient, Provide patient/family education based on risk assessment, Educate patient/family to call staff for assistance when getting out of bed, Place fall precaution signage outside patient door, Place patient in room close to nursing station, Utilize bed/chair fall alarm, Notify charge of high risk for huddle, and Bed alarm connected correctly  Bed type: Regular (Sincere Score less than 17 interventions in place)  Bedside sitter: Not Applicable   Isolation: Not applicable

## 2024-11-26 NOTE — THERAPY
Occupational Therapy Contact Note    Patient Name: April Alicia  Age:  64 y.o., Sex:  female  Medical Record #: [id number]  Today's Date: 11/26/2024    Discussed missed therapy with Mari &        11/26/24 1204   Interdisciplinary Plan of Care Collaboration   Collaboration Comments Pt is a long term resident at [facility].  Plan is for pt to DC back to [place] following her EGD tomorrow.  Acute OT eval not indicated.  Discussed with LISETTE.

## 2024-11-27 ENCOUNTER — ANESTHESIA (OUTPATIENT)
Dept: SURGERY | Facility: MEDICAL CENTER | Age: 64
End: 2024-11-27
Payer: MEDICAID

## 2024-11-27 LAB
ANION GAP SERPL CALC-SCNC: 7 MMOL/L (ref 7–16)
BUN SERPL-MCNC: 30 MG/DL (ref 8–22)
CALCIUM SERPL-MCNC: 7.9 MG/DL (ref 8.5–10.5)
CHLORIDE SERPL-SCNC: 105 MMOL/L (ref 96–112)
CO2 SERPL-SCNC: 20 MMOL/L (ref 20–33)
CREAT SERPL-MCNC: 0.74 MG/DL (ref 0.5–1.4)
ERYTHROCYTE [DISTWIDTH] IN BLOOD BY AUTOMATED COUNT: 63.7 FL (ref 35.9–50)
GFR SERPLBLD CREATININE-BSD FMLA CKD-EPI: 90 ML/MIN/1.73 M 2
GLUCOSE BLD STRIP.AUTO-MCNC: 85 MG/DL (ref 65–99)
GLUCOSE BLD STRIP.AUTO-MCNC: 87 MG/DL (ref 65–99)
GLUCOSE SERPL-MCNC: 94 MG/DL (ref 65–99)
HCT VFR BLD AUTO: 28.1 % (ref 37–47)
HCT VFR BLD AUTO: 28.2 % (ref 37–47)
HCT VFR BLD AUTO: 29.4 % (ref 37–47)
HGB BLD-MCNC: 9 G/DL (ref 12–16)
HGB BLD-MCNC: 9 G/DL (ref 12–16)
HGB BLD-MCNC: 9.2 G/DL (ref 12–16)
MAGNESIUM SERPL-MCNC: 1.8 MG/DL (ref 1.5–2.5)
MCH RBC QN AUTO: 28.7 PG (ref 27–33)
MCHC RBC AUTO-ENTMCNC: 31.9 G/DL (ref 32.2–35.5)
MCV RBC AUTO: 89.8 FL (ref 81.4–97.8)
PHOSPHATE SERPL-MCNC: 4.3 MG/DL (ref 2.5–4.5)
PLATELET # BLD AUTO: 72 K/UL (ref 164–446)
PLATELETS.RETICULATED NFR BLD AUTO: 0.8 % (ref 0.6–13.1)
PMV BLD AUTO: 10.4 FL (ref 9–12.9)
POTASSIUM SERPL-SCNC: 4.4 MMOL/L (ref 3.6–5.5)
RBC # BLD AUTO: 3.14 M/UL (ref 4.2–5.4)
SODIUM SERPL-SCNC: 132 MMOL/L (ref 135–145)
WBC # BLD AUTO: 3.4 K/UL (ref 4.8–10.8)

## 2024-11-27 PROCEDURE — 700102 HCHG RX REV CODE 250 W/ 637 OVERRIDE(OP): Performed by: INTERNAL MEDICINE

## 2024-11-27 PROCEDURE — 0DJ08ZZ INSPECTION OF UPPER INTESTINAL TRACT, VIA NATURAL OR ARTIFICIAL OPENING ENDOSCOPIC: ICD-10-PCS | Performed by: INTERNAL MEDICINE

## 2024-11-27 PROCEDURE — A9270 NON-COVERED ITEM OR SERVICE: HCPCS

## 2024-11-27 PROCEDURE — 700102 HCHG RX REV CODE 250 W/ 637 OVERRIDE(OP): Performed by: STUDENT IN AN ORGANIZED HEALTH CARE EDUCATION/TRAINING PROGRAM

## 2024-11-27 PROCEDURE — 700102 HCHG RX REV CODE 250 W/ 637 OVERRIDE(OP)

## 2024-11-27 PROCEDURE — 700102 HCHG RX REV CODE 250 W/ 637 OVERRIDE(OP): Performed by: HOSPITALIST

## 2024-11-27 PROCEDURE — 160048 HCHG OR STATISTICAL LEVEL 1-5: Performed by: INTERNAL MEDICINE

## 2024-11-27 PROCEDURE — 43235 EGD DIAGNOSTIC BRUSH WASH: CPT | Performed by: INTERNAL MEDICINE

## 2024-11-27 PROCEDURE — 85018 HEMOGLOBIN: CPT

## 2024-11-27 PROCEDURE — 770020 HCHG ROOM/CARE - TELE (206)

## 2024-11-27 PROCEDURE — 85014 HEMATOCRIT: CPT

## 2024-11-27 PROCEDURE — 700105 HCHG RX REV CODE 258: Performed by: STUDENT IN AN ORGANIZED HEALTH CARE EDUCATION/TRAINING PROGRAM

## 2024-11-27 PROCEDURE — 80048 BASIC METABOLIC PNL TOTAL CA: CPT

## 2024-11-27 PROCEDURE — 160002 HCHG RECOVERY MINUTES (STAT): Performed by: INTERNAL MEDICINE

## 2024-11-27 PROCEDURE — 83735 ASSAY OF MAGNESIUM: CPT

## 2024-11-27 PROCEDURE — A9270 NON-COVERED ITEM OR SERVICE: HCPCS | Performed by: STUDENT IN AN ORGANIZED HEALTH CARE EDUCATION/TRAINING PROGRAM

## 2024-11-27 PROCEDURE — 700111 HCHG RX REV CODE 636 W/ 250 OVERRIDE (IP): Performed by: STUDENT IN AN ORGANIZED HEALTH CARE EDUCATION/TRAINING PROGRAM

## 2024-11-27 PROCEDURE — 85027 COMPLETE CBC AUTOMATED: CPT

## 2024-11-27 PROCEDURE — 85055 RETICULATED PLATELET ASSAY: CPT

## 2024-11-27 PROCEDURE — 99233 SBSQ HOSP IP/OBS HIGH 50: CPT | Performed by: HOSPITALIST

## 2024-11-27 PROCEDURE — 82962 GLUCOSE BLOOD TEST: CPT

## 2024-11-27 PROCEDURE — A9270 NON-COVERED ITEM OR SERVICE: HCPCS | Performed by: INTERNAL MEDICINE

## 2024-11-27 PROCEDURE — 84100 ASSAY OF PHOSPHORUS: CPT

## 2024-11-27 PROCEDURE — A9270 NON-COVERED ITEM OR SERVICE: HCPCS | Performed by: HOSPITALIST

## 2024-11-27 PROCEDURE — 160009 HCHG ANES TIME/MIN: Performed by: INTERNAL MEDICINE

## 2024-11-27 PROCEDURE — 700101 HCHG RX REV CODE 250: Performed by: STUDENT IN AN ORGANIZED HEALTH CARE EDUCATION/TRAINING PROGRAM

## 2024-11-27 PROCEDURE — 36415 COLL VENOUS BLD VENIPUNCTURE: CPT

## 2024-11-27 PROCEDURE — 160035 HCHG PACU - 1ST 60 MINS PHASE I: Performed by: INTERNAL MEDICINE

## 2024-11-27 PROCEDURE — 160202 HCHG ENDO MINUTES - 1ST 30 MINS LEVEL 3: Performed by: INTERNAL MEDICINE

## 2024-11-27 RX ORDER — LANOLIN ALCOHOL/MO/W.PET/CERES
400 CREAM (GRAM) TOPICAL 2 TIMES DAILY
Status: DISCONTINUED | OUTPATIENT
Start: 2024-11-27 | End: 2024-11-28

## 2024-11-27 RX ORDER — ONDANSETRON 2 MG/ML
4 INJECTION INTRAMUSCULAR; INTRAVENOUS
Status: DISCONTINUED | OUTPATIENT
Start: 2024-11-27 | End: 2024-11-27 | Stop reason: HOSPADM

## 2024-11-27 RX ORDER — LABETALOL HYDROCHLORIDE 5 MG/ML
5 INJECTION, SOLUTION INTRAVENOUS
Status: DISCONTINUED | OUTPATIENT
Start: 2024-11-27 | End: 2024-11-27 | Stop reason: HOSPADM

## 2024-11-27 RX ORDER — EPHEDRINE SULFATE 50 MG/ML
5 INJECTION, SOLUTION INTRAVENOUS
Status: DISCONTINUED | OUTPATIENT
Start: 2024-11-27 | End: 2024-11-27 | Stop reason: HOSPADM

## 2024-11-27 RX ORDER — DEXTROSE MONOHYDRATE 25 G/50ML
25 INJECTION, SOLUTION INTRAVENOUS
Status: DISCONTINUED | OUTPATIENT
Start: 2024-11-27 | End: 2024-11-27 | Stop reason: HOSPADM

## 2024-11-27 RX ORDER — HYDRALAZINE HYDROCHLORIDE 20 MG/ML
5 INJECTION INTRAMUSCULAR; INTRAVENOUS
Status: DISCONTINUED | OUTPATIENT
Start: 2024-11-27 | End: 2024-11-27 | Stop reason: HOSPADM

## 2024-11-27 RX ORDER — SODIUM CHLORIDE 9 MG/ML
INJECTION, SOLUTION INTRAVENOUS
Status: DISCONTINUED | OUTPATIENT
Start: 2024-11-27 | End: 2024-11-27 | Stop reason: SURG

## 2024-11-27 RX ORDER — HALOPERIDOL 5 MG/ML
1 INJECTION INTRAMUSCULAR
Status: DISCONTINUED | OUTPATIENT
Start: 2024-11-27 | End: 2024-11-27 | Stop reason: HOSPADM

## 2024-11-27 RX ORDER — DIPHENHYDRAMINE HYDROCHLORIDE 50 MG/ML
12.5 INJECTION INTRAMUSCULAR; INTRAVENOUS
Status: DISCONTINUED | OUTPATIENT
Start: 2024-11-27 | End: 2024-11-27 | Stop reason: HOSPADM

## 2024-11-27 RX ORDER — SODIUM CHLORIDE, SODIUM LACTATE, POTASSIUM CHLORIDE, CALCIUM CHLORIDE 600; 310; 30; 20 MG/100ML; MG/100ML; MG/100ML; MG/100ML
INJECTION, SOLUTION INTRAVENOUS CONTINUOUS
Status: DISCONTINUED | OUTPATIENT
Start: 2024-11-27 | End: 2024-11-27 | Stop reason: HOSPADM

## 2024-11-27 RX ORDER — ALBUTEROL SULFATE 5 MG/ML
2.5 SOLUTION RESPIRATORY (INHALATION)
Status: DISCONTINUED | OUTPATIENT
Start: 2024-11-27 | End: 2024-11-27 | Stop reason: HOSPADM

## 2024-11-27 RX ORDER — LIDOCAINE HYDROCHLORIDE 20 MG/ML
INJECTION, SOLUTION EPIDURAL; INFILTRATION; INTRACAUDAL; PERINEURAL PRN
Status: DISCONTINUED | OUTPATIENT
Start: 2024-11-27 | End: 2024-11-27 | Stop reason: SURG

## 2024-11-27 RX ORDER — INSULIN LISPRO 100 [IU]/ML
2-9 INJECTION, SOLUTION INTRAVENOUS; SUBCUTANEOUS EVERY 6 HOURS
Status: DISCONTINUED | OUTPATIENT
Start: 2024-11-27 | End: 2024-11-27 | Stop reason: HOSPADM

## 2024-11-27 RX ADMIN — SODIUM CHLORIDE: 9 INJECTION, SOLUTION INTRAVENOUS at 09:09

## 2024-11-27 RX ADMIN — Medication 400 MG: at 16:58

## 2024-11-27 RX ADMIN — OMEPRAZOLE 20 MG: 20 CAPSULE, DELAYED RELEASE ORAL at 05:29

## 2024-11-27 RX ADMIN — ATORVASTATIN CALCIUM 20 MG: 20 TABLET, FILM COATED ORAL at 20:15

## 2024-11-27 RX ADMIN — LACTULOSE 30 ML: 10 SOLUTION ORAL; RECTAL at 17:13

## 2024-11-27 RX ADMIN — OXYCODONE HYDROCHLORIDE 10 MG: 10 TABLET ORAL at 10:48

## 2024-11-27 RX ADMIN — OMEPRAZOLE 20 MG: 20 CAPSULE, DELAYED RELEASE ORAL at 16:58

## 2024-11-27 RX ADMIN — SPIRONOLACTONE 50 MG: 25 TABLET ORAL at 05:28

## 2024-11-27 RX ADMIN — LACTULOSE 30 ML: 10 SOLUTION ORAL; RECTAL at 05:29

## 2024-11-27 RX ADMIN — GABAPENTIN 400 MG: 400 CAPSULE ORAL at 17:15

## 2024-11-27 RX ADMIN — OXYCODONE HYDROCHLORIDE 10 MG: 10 TABLET ORAL at 05:29

## 2024-11-27 RX ADMIN — NYSTATIN: 100000 POWDER TOPICAL at 05:29

## 2024-11-27 RX ADMIN — FUROSEMIDE 40 MG: 40 TABLET ORAL at 05:29

## 2024-11-27 RX ADMIN — LIDOCAINE HYDROCHLORIDE 50 MG: 20 INJECTION, SOLUTION EPIDURAL; INFILTRATION; INTRACAUDAL; PERINEURAL at 09:16

## 2024-11-27 RX ADMIN — CARVEDILOL 3.12 MG: 3.12 TABLET, FILM COATED ORAL at 16:58

## 2024-11-27 RX ADMIN — PROPOFOL 100 MG: 10 INJECTION, EMULSION INTRAVENOUS at 09:16

## 2024-11-27 RX ADMIN — OXYCODONE HYDROCHLORIDE 10 MG: 10 TABLET ORAL at 20:41

## 2024-11-27 RX ADMIN — LACTULOSE 30 ML: 10 SOLUTION ORAL; RECTAL at 12:36

## 2024-11-27 RX ADMIN — NYSTATIN: 100000 POWDER TOPICAL at 18:00

## 2024-11-27 RX ADMIN — OXYCODONE HYDROCHLORIDE 10 MG: 10 TABLET ORAL at 16:32

## 2024-11-27 RX ADMIN — CEFTRIAXONE SODIUM 2000 MG: 10 INJECTION, POWDER, FOR SOLUTION INTRAVENOUS at 17:15

## 2024-11-27 RX ADMIN — GABAPENTIN 400 MG: 400 CAPSULE ORAL at 05:29

## 2024-11-27 RX ADMIN — CARVEDILOL 3.12 MG: 3.12 TABLET, FILM COATED ORAL at 12:36

## 2024-11-27 ASSESSMENT — ENCOUNTER SYMPTOMS
FEVER: 0
CHILLS: 0
CONSTIPATION: 0
ABDOMINAL PAIN: 1
WEAKNESS: 1
SHORTNESS OF BREATH: 0

## 2024-11-27 ASSESSMENT — PAIN DESCRIPTION - PAIN TYPE
TYPE: ACUTE PAIN
TYPE: SURGICAL PAIN
TYPE: ACUTE PAIN
TYPE: CHRONIC PAIN
TYPE: ACUTE PAIN

## 2024-11-27 ASSESSMENT — FIBROSIS 4 INDEX: FIB4 SCORE: 5.71

## 2024-11-27 ASSESSMENT — PAIN SCALES - GENERAL: PAIN_LEVEL: 6

## 2024-11-27 NOTE — DISCHARGE PLANNING
-1510  DPA confirmed with Rory from [facility] that facility can accept pt back tomorrow. Rory requesting 1200 transport time, RN CM notified.

## 2024-11-27 NOTE — CARE PLAN
The patient is Stable - Low risk of patient condition declining or worsening    Shift Goals  Clinical Goals: EGD  Patient Goals: pain control  Family Goals: gisele    Progress made toward(s) clinical / shift goals:    Problem: Knowledge Deficit - Standard  Goal: Patient and family/care givers will demonstrate understanding of plan of care, disease process/condition, diagnostic tests and medications  Outcome: Progressing     Problem: Fall Risk  Goal: Patient will remain free from falls  Outcome: Progressing       Patient is not progressing towards the following goals:

## 2024-11-27 NOTE — OR NURSING
0924 Patient arrived from OR to PACU 18. Connected to monitor and report received from anesthesia and RN. VSS. 6 L 02 via mask. No airway in place. Breaths calm, even, unlabored.     FSBS 85    0947: Pt waking up; room air. No pain or nausea    0950: Pt tolerated a few sips of water    0952: Report to floor DANYEL Moraes via phone    1001: Pt meets criteria to return to floor. Pt transported to T8 via [place] by transport tech. Ok for pt to be off tele for transport per order. No belongings with pt.

## 2024-11-27 NOTE — PROGRESS NOTES
Hospital Medicine Daily Progress Note    Date of Service  11/27/2024    Chief Complaint  April Alicia is a 64 y.o. female admitted 11/23/2024 with abd pain    Hospital Course  64 y.o. female with extensive medical history including alcohol cirrhosis of the liver, chronic anemia, recent ESBL UTI, A-fib previously on Eliquis, GI bleeds, GERD, asthma, hypertension, HCV, type 2 diabetes, who presented 11/23/2024 with progressive abdominal pain.      Patient was in [facility] complaining of abdominal pain, ultrasound was performed negative for gallbladder concerns, did not show possible 3.3 cm abdominal aortic aneurysm and ascites.  Due to the ongoing abdominal pain that has been persistent for the past 2 weeks and history of cirrhosis she was sent to the ED.  Also reports nonbloody vomiting, has intermittent dizziness, also have been having some melanotic stools intermittently bloody.  CNA from facility in regards to her stool this morning notes they were not black, stools usually dark and tarry.  Abdominal pain is generalized, moderate to severe, generalized.  No fevers or chills headache or vision changes chest pain dyspnea cough dysuria or diarrhea.    Interval Problem Update  Seen patient at bedside  Patient is lethargic, drowsy  Reports abd pain.   Paracentesis ordered to rule out SBP  ? Dark stool, check occult blood stool  Monitoring H&H  Afib, rate controlled     11/25 vitals stable on room air  WBC 3.7, Hb 8.7, plt 79  Renal function stable  Ammonia 99  CT abdomen pelvis showed no evidence of bowel obstruction, cirrhotic appearance of the liver with splenomegaly, gastric varices and small amount of ascites  Paracentesis unable to be performed as ascites was too small of volume  Patient on lactulose TID, last BM 11/23.   Question of GI bleed on admit, hemoglobin stable however since varices visible on CT dc lovenox, increase omeprazole BID. Change BB to coreg to help with portal hypertension.   Discussed with GI who is planning EGD tomorrow.  Discussed with nursing, patient did have a large bowel movement this morning that was brown no melena or blood noted.  Will hold off on octreotide for now, if develops melena will start.  Patient reports she is feeling better today, mentation improving with lactulose          11/26 patient is resting in bed, patient is new to me today, patient denies any fever chills, no nausea no vomiting denies any chest pain palpitations, EGD was canceled today because patient ate, patient will be n.p.o. after midnight, clear liquid diet for today, note from GI reviewed, discussed with bedside nurse charge nurse  pharmacist, continue IV ceftriaxone, continue diuretics..  11/27 patient is resting in bed, she is alert oriented follows commands, she just came back from EGD, results discussed with patient and with patient's daughter, will continue monitoring overnight, hemoglobin stable, sodium decreased today, started on free water restriction, monitoring electrolytes, if stable hopefully will be able to DC tomorrow morning back to skilled nursing, all questions been answered discussed with  charge nurse bedside nurse.        I have discussed this patient's plan of care and discharge plan at IDT rounds today with Case Management, Nursing, Nursing leadership, and other members of the IDT team.    Consultants/Specialty  Gastroenterology    Code Status  Full Code    Disposition  The patient is not medically cleared for discharge to home or a post-acute facility.      I have placed the appropriate orders for post-discharge needs.    Review of Systems  Review of Systems   Constitutional:  Positive for malaise/fatigue. Negative for chills and fever.   Respiratory:  Negative for shortness of breath.    Cardiovascular:  Negative for chest pain.   Gastrointestinal:  Positive for abdominal pain. Negative for constipation and melena.   Neurological:  Positive for  weakness.   All other systems reviewed and are negative.       Physical Exam  Temp:  [36.4 °C (97.6 °F)-37.1 °C (98.8 °F)] 37.1 °C (98.8 °F)  Pulse:  [68-87] 72  Resp:  [12-18] 16  BP: ()/(63-87) 129/80  SpO2:  [94 %-100 %] 99 %    Physical Exam  Vitals and nursing note reviewed.   Constitutional:       General: She is not in acute distress.     Appearance: She is ill-appearing.   HENT:      Head: Normocephalic and atraumatic.   Eyes:      General: No scleral icterus.  Cardiovascular:      Rate and Rhythm: Normal rate. Rhythm irregular.      Pulses: Normal pulses.      Heart sounds: Normal heart sounds.   Pulmonary:      Effort: Pulmonary effort is normal.      Breath sounds: Normal breath sounds.   Abdominal:      General: There is distension (mild).      Palpations: Abdomen is soft.      Tenderness: There is no abdominal tenderness. There is no guarding.   Musculoskeletal:         General: Normal range of motion.      Cervical back: Normal range of motion and neck supple.      Right lower leg: No edema.      Left lower leg: No edema.   Skin:     General: Skin is warm and dry.   Neurological:      General: No focal deficit present.      Mental Status: She is alert and oriented to person, place, and time. Mental status is at baseline.      Motor: Weakness present.   Psychiatric:         Mood and Affect: Mood normal.         Behavior: Behavior normal.         Fluids    Intake/Output Summary (Last 24 hours) at 11/27/2024 1524  Last data filed at 11/27/2024 1300  Gross per 24 hour   Intake 1100 ml   Output 1950 ml   Net -850 ml        Laboratory  Recent Labs     11/25/24  0302 11/26/24  0043 11/26/24  2035 11/27/24  0024 11/27/24  1041   WBC 3.7* 3.7*  --  3.4*  --    RBC 3.03* 2.90*  --  3.14*  --    HEMOGLOBIN 8.7* 8.5* 9.0* 9.0* 9.0*   HEMATOCRIT 27.5* 26.8* 29.2* 28.2* 28.1*   MCV 90.8 92.4  --  89.8  --    MCH 28.7 29.3  --  28.7  --    MCHC 31.6* 31.7*  --  31.9*  --    RDW 65.3* 67.7*  --  63.7*  --     PLATELETCT 79* 82*  --  72*  --    MPV 9.2 9.6  --  10.4  --      Recent Labs     11/25/24  0302 11/26/24  0043 11/27/24  0024   SODIUM 136 139 132*   POTASSIUM 4.8 5.1 4.4   CHLORIDE 110 112 105   CO2 20 19* 20   GLUCOSE 98 126* 94   BUN 25* 31* 30*   CREATININE 0.75 0.89 0.74   CALCIUM 8.0* 7.7* 7.9*     Recent Labs     11/26/24  0043   INR 1.54*               Imaging  CT-ABDOMEN-PELVIS W/O   Final Result      1.  No evidence of bowel obstruction or focal inflammatory change.      2.  Bibasilar atelectasis and small bilateral pleural effusions.      3.  Cirrhotic appearance of the liver with splenomegaly, gastroesophageal varices, recanalization of the umbilical vein with splenic varices and small amount of ascites consistent with portal hypertension.      4.  Anasarca.      5.  Cardiomegaly.      US-ABDOMEN LTD (SOFT TISSUE)   Final Result      Small amount of ascites present. Paracentesis not performed.      US-RUQ   Final Result      1. Cirrhotic appearing liver with perihepatic ascites.      2. There is a 6 mm gallbladder polyp.              Assessment/Plan  * Atrial fibrillation with RVR (HCC)- (present on admission)  Assessment & Plan  Presented in RVR, responded well to IV metoprolol.  Continue p.o. metoprolol  Monitor on telemetry.  Check magnesium.  Recently stopped Eliquis.  Keep magnesium greater than 2 potassium greater than 4  Rate control.    Portal hypertension (HCC)  Assessment & Plan  Changed to Coreg, increase as tolerated    Advance care planning  Assessment & Plan  This is  65 yo female with multiple co-morbidities including alcohol cirrhosis of the liver, chronic anemia, recent ESBL UTI, A-fib previously on Eliquis, GI bleeds, GERD, asthma, hypertension, HCV, type 2 diabetes, who presented 11/23/2024 with progressive abdominal pain requiring further workup  Discussed goal of care and code status with patient. She has medical capacity. She confirms FULL CODE. ACP 16 mins    SBP (spontaneous  "bacterial peritonitis) (HCC)  Assessment & Plan  Paracentesis ordered to rule out, patient was started on ceftriaxone blood cultures were drawn.  Will continue ceftriaxone for now, follow cultures.  Completing antibiotic as    Bloody stool  Assessment & Plan  Unclear reports regarding hematochezia versus melena versus brown stool.  She is on p.o. iron outpatient, Eliquis was stopped.  Per last DC summary \"I discussed with her the risks and benefits of discontinuing her Eliquis due to the drop in hemoglobin and risk for further bleeding. Discussed with patient that her risk for stroke does increase if she were to go off of her Eliquis. Patient is in agreement of stopping her Eliquis due to her increased risk of bleeding and current drop in hemoglobin. She states that she understands the risks and she is okay with the change.\"  DC Lovenox  Omeprazole twice daily  GI consulted as patient has varices on CT, plan for EGD tomorrow  -N.p.o. after midnight    Patient was going to get EGD today but unfortunately she ate this morning procedure was canceled and rescheduled for tomorrow patient will be kept n.p.o. after midnight clear liquid diet for now    EGD today showed Portal hypertensive gastropathy which is likely the source of intermittent melena. No high risk lesion to treat today.       Narcotic dependence (HCC)- (present on admission)  Assessment & Plan  Continue p.o. regimen  Analgesia as needed, adjust regimen as indicated    Arthritis  Assessment & Plan  The ASCVD Risk score (Milka DK, et al., 2019) failed to calculate for the following reasons:    The valid total cholesterol range is 130 to 320 mg/dL      Mild intermittent asthma without complication- (present on admission)  Assessment & Plan  None acute exacerbation.  DuoNebs as needed.  Continuous pulse ox    Type 2 diabetes mellitus with diabetic neuropathy, with long-term current use of insulin (HCC)- (present on admission)  Assessment & Plan  Diabetic " diet  SSI    Hepatic encephalopathy (HCC)- (present on admission)  Assessment & Plan  Trending ammonia  Lactulose.    GERD (gastroesophageal reflux disease)- (present on admission)  Assessment & Plan  Continue Protonix    Dyslipidemia- (present on admission)  Assessment & Plan  Continue statin    Paroxysmal atrial fibrillation- (present on admission)  Assessment & Plan  Recently stopped Eliquis, presented with RVR.  Change oral metoprolol to Coreg for both A-fib and portal hypertension  IV metoprolol on for RVR.  Monitor on telemetry.  Follow potassium magnesium and potassium greater than 4 magnesium greater than 2.    Holding Eliquis    Hypertension- (present on admission)  Assessment & Plan  On metoprolol.  IV antihypertensives ordered with parameters.    Thrombocytopenia (HCC)- (present on admission)  Assessment & Plan  Secondary to cirrhosis, no active bleeding    Alcoholic cirrhosis of liver (HCC)- (present on admission)  Assessment & Plan  Presenting with abdominal pain, imaging shows small volume ascites, ERP did not feel there was enough fluid at this time for safe paracentesis, deferred.  Started on ceftriaxone in the ED, will continue for now for SBP.  Continue lactulose, rifaximin, spironolactone, Lasix.  Continue monitoring  Monitoring         VTE prophylaxis: SCDs/TEDs          I have reviewed CBC  Reviewed BMP  I reviewed magnesium levels  I reviewed phosphorus levels  I reviewed note from GI EGD note  Patient is on IV ceftriaxone multiple side effects include but not limited to C. difficile infection, interstitial nephritis, neutropenia  I have ordered BMP for in a.m.  Discussed with clinical pharmacist

## 2024-11-27 NOTE — OP REPORT
PreOp Diagnosis: Melena, anemia, cirrhosis      PostOp Diagnosis: Portal hypertensive gastropathy without active bleeding      Procedure(s):  GASTROSCOPY - Wound Class: Clean Contaminated    Surgeon(s):  Jose Guadalupe Velázquez M.D.    Anesthesiologist/Type of Anesthesia:  Anesthesiologist: Jimmy Denny M.D./LELIA    Surgical Staff:  Circulator: Jayshree Joseph R.N.  Endoscopy Technician: Errol Pinedo  Endoscopy Nurse: Niels Olivares R.N.    Specimens removed if any:  * No specimens in log *      CONSENT: The risks, benefits and alternatives of the procedure were discussed in detail. The risks include and are not limited to bleeding, infection, perforation, missed lesions, and sedations risks (cardiopulmonary compromise and allergic reaction to medications).    DESCRIPTION:   The patient presented to the operating room.  A time out was performed prior to beginning the procedure.   The patient was placed in the left lateral position.   Patient was sedated by anesthesia: Propofol.    OPERATIVE FINDINGS:    Esophagus: Normal.  No varices.  Stomach: No gastroesophageal varices.  Diffuse portal hypertensive gastropathy with stigmata of recent bleeding but no high risk lesion.  The gastropathy was dominant in the fundus, body.  Antrum of the stomach relatively spared.  Duodenum: Normal to the second portion.      Blood loss: None    The patient tolerated the procedure well.      There were no immediate complications.    IMPRESSION:  Portal hypertensive gastropathy which is likely the source of intermittent melena.  No high risk lesion to treat today.    No esophagogastric varices identified.    RECOMMENDATIONS:  Observe clinical course  2 g sodium restriction diet  Return patient to hospital beltr[facility] for continued care

## 2024-11-27 NOTE — ASSESSMENT & PLAN NOTE
The ASCVD Risk score (Milka DICK, et al., 2019) failed to calculate for the following reasons:    The valid total cholesterol range is 130 to 320 mg/dL

## 2024-11-27 NOTE — PROGRESS NOTES
Gastroenterology Progress Note               Author:  DANYELL Stephens Date & Time Created: 11/26/2024 5:13 PM       Patient ID:  Name:             April Alicia  YOB: 1960  Age:                 64 y.o.  female  MRN:               [id number]        Medical Decision Making, by Problem:  Active Hospital Problems    Diagnosis     Portal hypertension (HCC) [K76.6]     Advance care planning [Z71.89]     Atrial fibrillation with RVR (HCC) [I48.91]     Bloody stool [K92.1]     SBP (spontaneous bacterial peritonitis) (HCC) [K65.2]     Narcotic dependence (HCC) [F11.20]     GERD (gastroesophageal reflux disease) [K21.9]     Hepatic encephalopathy (HCC) [K76.82]     Type 2 diabetes mellitus with diabetic neuropathy, with long-term current use of insulin (HCC) [E11.40, Z79.4]     Mild intermittent asthma without complication [J45.20]     Dyslipidemia [E78.5]     Paroxysmal atrial fibrillation [I48.0]     Thrombocytopenia (HCC) [D69.6]     Hypertension [I10]     Alcoholic cirrhosis of liver (HCC) [K70.30]            Presenting Chief Complaint:   Anemia, right side abdominal pain,?  Melena     History of Present Illness:   The patient is 64 years old female with medical history of cirrhosis from hepatitis C and possible fatty liver, the patient had GI consultant provider for liver disease follow-up in the past, the last EGD and colon was 2015 GI see office in the chart, this time, the GI team is consulted for possible melena and abdominal pain.     In 2015 likely the last EGD and colon the patient had in the past.  There was no record to show more EGD and colon recently.  The patient reports that she is trying to reestablish with GI consultant recently, waiting for the appointment.     Due to the symptom, CAT scan was done on November 24, minimal ascites, possible varices, no other major pathology from her GI.     Melena was noted yesterday however today the bowel movement was brown, which  confirmed by the family member at the bedside.  No evidence of frequency at this time but yesterday the patient had multiple bowel movement.     Currently hemoglobin around 8.  Stable vitals at bedside, no evidence of acute abdomen.         Interval History:  11/26/2024: EGD for today was canceled due to patient having eaten a cookie for breakfast.  Will reschedule for tomorrow.        Hospital Medications:  Current Facility-Administered Medications   Medication Dose Frequency Provider Last Rate Last Admin    nystatin (Mycostatin) powder   BID Cb Jorgensen M.D.   Given at 11/26/24 1700    carvedilol (Coreg) tablet 3.125 mg  3.125 mg BID WITH MEALS Lian Sifuentes D.O.   3.125 mg at 11/26/24 1700    omeprazole (PriLOSEC) capsule 20 mg  20 mg BID Lian Sifuentes, D.O.   20 mg at 11/26/24 1700    oxyCODONE immediate release (Roxicodone) tablet 10 mg  10 mg Q4HRS PRN Mark Irving P.A.-CErrol   10 mg at 11/26/24 1523    morphine 10 mg/mL injection 5 mg  5 mg Q4HRS PRN Mark Irving P.A.-C.   5 mg at 11/25/24 0549    atorvastatin (Lipitor) tablet 20 mg  20 mg Nightly Azar Mills M.D.   20 mg at 11/25/24 2011    gabapentin (Neurontin) capsule 400 mg  400 mg BID Azar Mills M.D.   400 mg at 11/26/24 1700    lactulose 20 GM/30ML solution 30 mL  30 mL TID Azar Mills M.D.   30 mL at 11/26/24 1700    acetaminophen (Tylenol) tablet 650 mg  650 mg Q6HRS PRN Azar Mills M.D.   650 mg at 11/24/24 0547    labetalol (Normodyne/Trandate) injection 10 mg  10 mg Q4HRS PRN Azar Mills M.D.        promethazine (Phenergan) tablet 12.5-25 mg  12.5-25 mg Q4HRS PRN Azar Mills M.D.        promethazine (Phenergan) suppository 12.5-25 mg  12.5-25 mg Q4HRS PRN Azar Mills M.D.        cefTRIAXone (Rocephin) syringe 2,000 mg  2,000 mg Q24HRS Azar Mills M.D.   2,000 mg at 11/26/24 1706    Metoprolol Tartrate (Lopressor) injection 5 mg  5 mg Q5 MIN PRN Azar Mills M.D.         "ipratropium-albuterol (DUONEB) nebulizer solution  3 mL Q4H PRN (RT) Azar Mills M.D.        furosemide (Lasix) tablet 40 mg  40 mg DAILY Azar Mills M.D.   40 mg at 11/26/24 0436    spironolactone (Aldactone) tablet 50 mg  50 mg DAILY Azar Mills M.D.   50 mg at 11/26/24 0436   Last reviewed on 11/23/2024 11:03 PM by Marielos Deleon R.N.       Review of Systems:  Review of Systems   Constitutional:  Positive for malaise/fatigue. Negative for chills and fever.   HENT:  Negative for congestion and sore throat.    Respiratory:  Negative for cough and shortness of breath.    Cardiovascular:  Negative for chest pain.   Gastrointestinal:  Positive for abdominal pain.   Musculoskeletal:  Negative for joint pain and myalgias.   Neurological:  Positive for weakness. Negative for dizziness.   Psychiatric/Behavioral:  The patient is not nervous/anxious and does not have insomnia.    All other systems reviewed and are negative.        Vital signs:  Weight/BMI: Body mass index is 31.98 kg/m².  /86   Pulse 69   Temp 36.5 °C (97.7 °F) (Tympanic)   Resp 16   Ht 1.727 m (5' 8\")   Wt 95.4 kg (210 lb 5.1 oz)   SpO2 99%   Vitals:    11/26/24 0351 11/26/24 0730 11/26/24 1129 11/26/24 1516   BP: 112/64 110/64 119/85 134/86   Pulse: 98 95 79 69   Resp: 16 14 15 16   Temp: 36.6 °C (97.9 °F) 36.8 °C (98.3 °F) 36.9 °C (98.4 °F) 36.5 °C (97.7 °F)   TempSrc: Temporal Temporal Tympanic Tympanic   SpO2: 99% 92% 99% 99%   Weight: 95.4 kg (210 lb 5.1 oz)      Height:         Oxygen Therapy:  Pulse Oximetry: 99 %, O2 (LPM): 0, O2 Delivery Device: None - Room Air    Intake/Output Summary (Last 24 hours) at 11/26/2024 1713  Last data filed at 11/26/2024 1441  Gross per 24 hour   Intake 480 ml   Output 1500 ml   Net -1020 ml         Physical Exam:  Physical Exam  Vitals and nursing note reviewed.   Constitutional:       General: She is not in acute distress.     Appearance: She is obese. She is not ill-appearing. "   HENT:      Head: Normocephalic.      Nose: Nose normal. No congestion.      Mouth/Throat:      Mouth: Mucous membranes are moist.      Pharynx: Oropharynx is clear. No oropharyngeal exudate.   Eyes:      General: No scleral icterus.     Extraocular Movements: Extraocular movements intact.      Conjunctiva/sclera: Conjunctivae normal.   Cardiovascular:      Rate and Rhythm: Normal rate and regular rhythm.      Pulses: Normal pulses.      Heart sounds: Normal heart sounds.   Pulmonary:      Effort: Pulmonary effort is normal.      Breath sounds: Normal breath sounds.   Abdominal:      General: Abdomen is flat. Bowel sounds are normal. There is no distension.      Palpations: Abdomen is soft.      Tenderness: There is no abdominal tenderness. There is no guarding.   Musculoskeletal:      Right lower leg: No edema.   Skin:     General: Skin is warm and dry.      Capillary Refill: Capillary refill takes less than 2 seconds.      Coloration: Skin is not jaundiced.   Neurological:      General: No focal deficit present.      Mental Status: She is alert and oriented to person, place, and time. Mental status is at baseline.   Psychiatric:         Mood and Affect: Mood normal.         Behavior: Behavior normal.             Labs:  Recent Labs     11/23/24 1857 11/24/24 0043 11/25/24 0302 11/26/24 0043   SODIUM 137 138 136 139   POTASSIUM 4.5 4.6 4.8 5.1   CHLORIDE 110 111 110 112   CO2 19* 17* 20 19*   BUN 23* 23* 25* 31*   CREATININE 0.67 0.65 0.75 0.89   MAGNESIUM 1.9  --  1.8  --    PHOSPHORUS 3.7  --  4.5  --    CALCIUM 7.9* 7.7* 8.0* 7.7*     Recent Labs     11/23/24 1857 11/24/24 0043 11/25/24 0302 11/26/24 0043   ALTSGPT 9 11 10 7   ASTSGOT 21 20 23 17   ALKPHOSPHAT 272* 250* 230* 223*   TBILIRUBIN 0.9 0.8 0.7 0.5   LIPASE 29  --   --   --    GLUCOSE 115* 103* 98 126*     Recent Labs     11/23/24 1857 11/24/24  0043 11/25/24 0302 11/26/24 0043   WBC 4.1* 4.2* 3.7* 3.7*   NEUTSPOLYS 61.60 56.40  --   --     LYMPHOCYTES 21.10* 23.10  --   --    MONOCYTES 10.20 11.90  --   --    EOSINOPHILS 5.60 7.10*  --   --    BASOPHILS 1.00 1.00  --   --    ASTSGOT 21 20 23 17   ALTSGPT 9 11 10 7   ALKPHOSPHAT 272* 250* 230* 223*   TBILIRUBIN 0.9 0.8 0.7 0.5     Recent Labs     11/23/24  1857 11/24/24  0043 11/25/24  0302 11/26/24  0043   RBC 2.92* 2.94* 3.03* 2.90*   HEMOGLOBIN 8.5* 8.6* 8.7* 8.5*   HEMATOCRIT 26.9* 27.0* 27.5* 26.8*   PLATELETCT 82* 79* 79* 82*   PROTHROMBTM 17.8*  --   --  18.5*   INR 1.46*  --   --  1.54*     Recent Results (from the past 24 hours)   CBC WITHOUT DIFFERENTIAL    Collection Time: 11/26/24 12:43 AM   Result Value Ref Range    WBC 3.7 (L) 4.8 - 10.8 K/uL    RBC 2.90 (L) 4.20 - 5.40 M/uL    Hemoglobin 8.5 (L) 12.0 - 16.0 g/dL    Hematocrit 26.8 (L) 37.0 - 47.0 %    MCV 92.4 81.4 - 97.8 fL    MCH 29.3 27.0 - 33.0 pg    MCHC 31.7 (L) 32.2 - 35.5 g/dL    RDW 67.7 (H) 35.9 - 50.0 fL    Platelet Count 82 (L) 164 - 446 K/uL    MPV 9.6 9.0 - 12.9 fL   Comp Metabolic Panel    Collection Time: 11/26/24 12:43 AM   Result Value Ref Range    Sodium 139 135 - 145 mmol/L    Potassium 5.1 3.6 - 5.5 mmol/L    Chloride 112 96 - 112 mmol/L    Co2 19 (L) 20 - 33 mmol/L    Anion Gap 8.0 7.0 - 16.0    Glucose 126 (H) 65 - 99 mg/dL    Bun 31 (H) 8 - 22 mg/dL    Creatinine 0.89 0.50 - 1.40 mg/dL    Calcium 7.7 (L) 8.5 - 10.5 mg/dL    Correct Calcium 8.9 8.5 - 10.5 mg/dL    AST(SGOT) 17 12 - 45 U/L    ALT(SGPT) 7 2 - 50 U/L    Alkaline Phosphatase 223 (H) 30 - 99 U/L    Total Bilirubin 0.5 0.1 - 1.5 mg/dL    Albumin 2.5 (L) 3.2 - 4.9 g/dL    Total Protein 6.7 6.0 - 8.2 g/dL    Globulin 4.2 (H) 1.9 - 3.5 g/dL    A-G Ratio 0.6 g/dL   Prothrombin Time    Collection Time: 11/26/24 12:43 AM   Result Value Ref Range    PT 18.5 (H) 12.0 - 14.6 sec    INR 1.54 (H) 0.87 - 1.13   IMMATURE PLT FRACTION    Collection Time: 11/26/24 12:43 AM   Result Value Ref Range    Imm. Plt Fraction 1.5 0.6 - 13.1 %   ESTIMATED GFR    Collection  Time: 11/26/24 12:43 AM   Result Value Ref Range    GFR (CKD-EPI) 72 >60 mL/min/1.73 m 2   POCT glucose device results    Collection Time: 11/26/24  7:49 AM   Result Value Ref Range    POC Glucose, Blood 105 (H) 65 - 99 mg/dL       Radiology Review:  CT-ABDOMEN-PELVIS W/O   Final Result      1.  No evidence of bowel obstruction or focal inflammatory change.      2.  Bibasilar atelectasis and small bilateral pleural effusions.      3.  Cirrhotic appearance of the liver with splenomegaly, gastroesophageal varices, recanalization of the umbilical vein with splenic varices and small amount of ascites consistent with portal hypertension.      4.  Anasarca.      5.  Cardiomegaly.      US-ABDOMEN LTD (SOFT TISSUE)   Final Result      Small amount of ascites present. Paracentesis not performed.      US-RUQ   Final Result      1. Cirrhotic appearing liver with perihepatic ascites.      2. There is a 6 mm gallbladder polyp.               MDM (Data Review):   -Records reviewed and summarized in current documentation  -I personally reviewed and interpreted the laboratory results  -I personally reviewed the radiology images    Assessment/Recommendations:  Melena  Anemia  Cirrhosis secondary to hepatitis C, possible fatty liver disease  Portal hypertension  Atrial fibrillation with RVR      Plan:  Monitor H/H and for signs of overt bleeding  Diet today per primary team; n.p.o. midnight  Plan for EGD tomorrow      Discussed with patient, primary team, Dr. Gandhi, Dr. Velázquez    Core Quality Measures   Reviewed items::  Labs, Medications and Radiology reports reviewed

## 2024-11-27 NOTE — DISCHARGE PLANNING
Case Management Discharge Planning    Admission Date: 11/23/2024  GMLOS: 4.5  ALOS: 2    6-Clicks ADL Score: 21  6-Clicks Mobility Score: 12  PT and/or OT Eval ordered: Yes  Post-acute Referrals Ordered: Yes  Post-acute Choice Obtained: Yes  Has referral(s) been sent to post-acute provider:  Yes      Anticipated Discharge Dispo: Discharge Disposition: D/T to SNF with Medicare cert in anticipation of skilled care (03)  Discharge Address: [address] [place] [id number]  Discharge Contact Phone Number: [phone]    DME Needed: No    Action(s) Taken: Transport Arranged     Patient discussed in afternoon rounds. Pt may be medically cleared for discharge tomorrow. DPA contacted [facility] and facility can accept patient back tomorrow at noon.     RNCM requested transport for noon but was informed by Ride line that patient does not have transport benefit with her Medicaid FFS plan.     RNCM reached out to leadership and got approved service for transportation to [facility]. ASF amount is $229.75 via [employer].    Received confirmation of transport time for noon.     Escalations Completed: None    Medically Clear: No    Next Steps: F/U with medical team regarding D/C needs/ barriers.     Barriers to Discharge: Medical clearance    Is the patient up for discharge tomorrow: No

## 2024-11-27 NOTE — PROGRESS NOTES
Bedside report received from off going RN/tech: Mike, assumed care of patient.     Fall Risk Score: MODERATE RISK  Fall risk interventions in place: Place yellow fall risk ID band on patient, Provide patient/family education based on risk assessment, Educate patient/family to call staff for assistance when getting out of bed, Place fall precaution signage outside patient door, Place patient in room close to nursing station, and Utilize bed/chair fall alarm  Bed type: Regular (Sincere Score less than 17 interventions in place)  Patient on cardiac monitor: Yes  IVF/IV medications: Not Applicable   Oxygen: Room Air  Bedside sitter: Not Applicable   Isolation: Not applicable

## 2024-11-27 NOTE — ANESTHESIA TIME REPORT
Anesthesia Start and Stop Event Times       Date Time Event    11/27/2024 0909 Anesthesia Start     0926 Anesthesia Stop          Responsible Staff  11/27/24      Name Role Begin End    Jimmy Denny M.D. Anesth 0909 0926          Overtime Reason:  no overtime (within assigned shift)    Comments:

## 2024-11-27 NOTE — DISCHARGE PLANNING
DC Transport Scheduled    Transport Company Scheduled:  [employer]  Spoke with [facility] at [facility] to schedule     Scheduled Date: 11/28/2024  Scheduled Time: 1200    Transport Type: [facility]  Destination: [facility]   Destination address: [address] [place] [address] [place] [address]    Notified care team of scheduled transport via [employer].     If there are any changes needed to the DC transportation scheduled, please contact [facility] Line at ext. [phone] between the hours of 9241-0599 Mon-Fri. If outside those hours, contact the ED Case Manager at ext. [phone].

## 2024-11-27 NOTE — PROGRESS NOTES
Bedside report received from off going RN/tech: Mirna, assumed care of patient. POC updated. Pt denies pain or any other needs at this time.     Fall Risk Score: MODERATE RISK  Fall risk interventions in place: Place yellow fall risk ID band on patient, Provide patient/family education based on risk assessment, Educate patient/family to call staff for assistance when getting out of bed, Place fall precaution signage outside patient door, Place patient in room close to nursing station, Utilize bed/chair fall alarm, Notify charge of high risk for huddle, and Bed alarm connected correctly  Bed type: Regular (Sincere Score less than 17 interventions in place)  Patient on cardiac monitor: Yes  IVF/IV medications: Not Applicable   Oxygen: Room Air  Bedside sitter: Not Applicable   Isolation: Not applicable

## 2024-11-27 NOTE — ANESTHESIA PREPROCEDURE EVALUATION
Case: [id number] Date/Time: 11/27/24 1055    Procedure: GASTROSCOPY    Location: [facility] ROOM 26 / SURGERY SAME DAY [facility]    Surgeons: Jose Guadalupe Velázquez M.D.            Relevant Problems   ANESTHESIA   (positive) Sleep apnea      PULMONARY   (positive) Mild intermittent asthma without complication      NEURO   (positive) Migraine with aura and without status migrainosus, not intractable   (positive) Seizures (HCC)      CARDIAC   (positive) Atrial fibrillation with RVR (HCC)   (positive) Hypertension   (positive) Migraine with aura and without status migrainosus, not intractable   (positive) Mitral valve regurgitation   (positive) Murmur   (positive) Paroxysmal atrial fibrillation   (positive) Portal hypertension (HCC)      GI   (positive) GERD (gastroesophageal reflux disease)         (positive) Alcoholic cirrhosis of liver (HCC)      ENDO   (positive) Type 2 diabetes mellitus with diabetic neuropathy, with long-term current use of insulin (HCC)      Other   (positive) Age-related cataract of both eyes   (positive) Anasarca   (positive) Arthritis   (positive) Chronic pain syndrome   (positive) Degeneration of lumbar intervertebral disc   (positive) Difficulty in walking, not elsewhere classified   (positive) Hypertensive retinopathy   (positive) Long-term current use of opiate analgesic   (positive) Physical deconditioning     Lab Results   Component Value Date/Time    WBC 3.7 (L) 11/26/2024 12:43 AM    RBC 2.90 (L) 11/26/2024 12:43 AM    HEMOGLOBIN 8.5 (L) 11/26/2024 12:43 AM    HEMATOCRIT 26.8 (L) 11/26/2024 12:43 AM    MCV 92.4 11/26/2024 12:43 AM    MCH 29.3 11/26/2024 12:43 AM    MCHC 31.7 (L) 11/26/2024 12:43 AM    MPV 9.6 11/26/2024 12:43 AM    NEUTSPOLYS 56.40 11/24/2024 12:43 AM    LYMPHOCYTES 23.10 11/24/2024 12:43 AM    MONOCYTES 11.90 11/24/2024 12:43 AM    EOSINOPHILS 7.10 (H) 11/24/2024 12:43 AM    EOSINOPHILS 2 01/02/2023 03:45 PM    BASOPHILS 1.00 11/24/2024 12:43 AM    HYPOCHROMIA 1+ 11/23/2024 06:57  PM    ANISOCYTOSIS 1+ 2024 06:57 PM    PLT 82    Lab Results   Component Value Date/Time    SODIUM 139 2024 12:43 AM    POTASSIUM 5.1 2024 12:43 AM    CHLORIDE 112 2024 12:43 AM    CO2 19 (L) 2024 12:43 AM    GLUCOSE 126 (H) 2024 12:43 AM    BUN 31 (H) 2024 12:43 AM    CREATININE 0.89 2024 12:43 AM    CREATININE 0.9 2009 06:50 PM    BUNCREATRAT 12.0 2022 10:02 PM    eGFR 72    Results for orders placed or performed during the hospital encounter of 24   EKG   Result Value Ref Range    Report       [facility]    Test Date:  2024  Pt Name:    FRANCISCA TURNER          Department: ER  MRN:        [id number]                      Room:       [facility]  Gender:     Female                       Technician: [id number]  :        1960                   Requested By:GRANT NUNEZ  Order #:    [id number]                    Reading MD: Grant Nunez    Measurements  Intervals                                Axis  Rate:       94                           P:          0  OH:         0                            QRS:        2  QRSD:       78                           T:          0  QT:         427  QTc:        535    Interpretive Statements  A-fib, rate 94, normal axis, Q waves in lead III, V3, unchanged compared to  prior 10/18/2024. prolonged QT  Electronically Signed On 2024 01:48:14 PST by Grant Nunez       *Note: Due to a large number of results and/or encounters for the requested time period, some results have not been displayed. A complete set of results can be found in Results Review.     Echo 24  Normal left ventricular systolic function. The ejection fraction is   measured to be  53% by Weiner's biplane.   Mild left ventricular hypertrophy.  Normal right ventricular size and systolic function.  Mild mitral regurgitation.  Mild tricuspid regurgitation. Estimated right ventricular systolic   pressure is 25  mmHg (normal).  The ascending aorta is dilated with a diameter of  4.0cm.  Compared to the prior study on 07/14/2022, the ascending aorta now   dilated (previously not clearly visualized).     Physical Exam    Airway   Mallampati: II  TM distance: >3 FB  Neck ROM: full       Cardiovascular - normal exam  Rhythm: regular  Rate: normal     Dental   (+) upper dentures, lower dentures           Pulmonary - normal exam  Breath sounds clear to auscultation     Abdominal    Neurological - normal exam                   Anesthesia Plan    ASA 3   ASA physical status 3 criteria: CAD/stents (> 3 months)    Plan - MAC               Induction: intravenous    Postoperative Plan: Postoperative administration of opioids is intended.    Pertinent diagnostic labs and testing reviewed    Informed Consent:    Anesthetic plan and risks discussed with patient.    Use of blood products discussed with: patient whom consented to blood products.       Risks of MAC/general anesthesia discussed including sore throat, damage to lips/teeth, anaphylaxis/drug reaction, aspiration, awareness, post-op nausea/vomiting, post-op delirium, myocardial infarction, cerebral vascular accident up to and including death.

## 2024-11-27 NOTE — INTERVAL H&P NOTE
The patient was seen and evaluated preoperatively.  The risk, benefits, and alternatives were discussed in detail. Risks include bowel perforation, procedure related bleeding event, infection, inability to safely complete the exam, sedation related complications. The patient, understanding the discussion, consents to proceed forward.

## 2024-11-27 NOTE — CARE PLAN
The patient is Watcher - Medium risk of patient condition declining or worsening    Shift Goals  Clinical Goals: EGD  Patient Goals: pain control  Family Goals: gisele    Progress made toward(s) clinical / shift goals:    Problem: Pain - Standard  Goal: Alleviation of pain or a reduction in pain to the patient’s comfort goal  Outcome: Progressing  Note: Pt assessed for pain regularly and medicated PRN per MAR.      Problem: Knowledge Deficit - Standard  Goal: Patient and family/care givers will demonstrate understanding of plan of care, disease process/condition, diagnostic tests and medications  Outcome: Progressing  Note: White board updated with POC and care team information during bedside report.     Problem: Fall Risk  Goal: Patient will remain free from falls  Outcome: Progressing  Note: Fall precautions in place. Bed in lowest position. Non-skid socks in place. Personal possessions within reach. Mobility sign on door. Bed-alarm on. Call light within reach. Pt educated regarding fall prevention and states understanding.      Problem: Infection  Goal: Will remain free from infection  Outcome: Progressing  Note: Vitals taken q4h; daily labs drawn; appropriate precautions in place; hand hygeine performed.       Patient is not progressing towards the following goals:

## 2024-11-27 NOTE — ANESTHESIA POSTPROCEDURE EVALUATION
Patient: April Alicia    Procedure Summary       Date: 11/27/24 Room / Location: [facility] ROOM 26 / SURGERY SAME DAY [facility]    Anesthesia Start: 0909 Anesthesia Stop: 0926    Procedure: GASTROSCOPY (Esophagus) Diagnosis: (Gastropathy)    Surgeons: Jose Guadalupe Velázquez M.D. Responsible Provider: Jimmy Denny M.D.    Anesthesia Type: MAC ASA Status: 3            Final Anesthesia Type: MAC  Last vitals  BP   Blood Pressure: 122/79    Temp   37.1 °C (98.8 °F)    Pulse   72   Resp   16    SpO2   97 %      Anesthesia Post Evaluation    Patient location during evaluation: PACU  Patient participation: complete - patient participated  Level of consciousness: awake and alert  Pain score: 6    Airway patency: patent  Anesthetic complications: no  Cardiovascular status: hemodynamically stable  Respiratory status: acceptable  Hydration status: euvolemic    PONV: none          No notable events documented.     Nurse Pain Score: 6 (NPRS)

## 2024-11-28 VITALS
WEIGHT: 206.79 LBS | SYSTOLIC BLOOD PRESSURE: 119 MMHG | RESPIRATION RATE: 17 BRPM | TEMPERATURE: 99.1 F | HEIGHT: 68 IN | BODY MASS INDEX: 31.34 KG/M2 | OXYGEN SATURATION: 95 % | HEART RATE: 86 BPM | DIASTOLIC BLOOD PRESSURE: 60 MMHG

## 2024-11-28 PROBLEM — K76.82 HEPATIC ENCEPHALOPATHY (HCC): Status: RESOLVED | Noted: 2022-12-27 | Resolved: 2024-11-28

## 2024-11-28 PROBLEM — I48.91 ATRIAL FIBRILLATION WITH RVR (HCC): Status: RESOLVED | Noted: 2024-11-23 | Resolved: 2024-11-28

## 2024-11-28 PROBLEM — K92.1 BLOODY STOOL: Status: RESOLVED | Noted: 2024-11-23 | Resolved: 2024-11-28

## 2024-11-28 PROBLEM — Z71.89 ADVANCE CARE PLANNING: Status: RESOLVED | Noted: 2024-11-24 | Resolved: 2024-11-28

## 2024-11-28 PROBLEM — K65.2 SBP (SPONTANEOUS BACTERIAL PERITONITIS) (HCC): Status: RESOLVED | Noted: 2024-11-23 | Resolved: 2024-11-28

## 2024-11-28 LAB
BACTERIA BLD CULT: NORMAL
BACTERIA BLD CULT: NORMAL
BASOPHILS # BLD AUTO: 1.5 % (ref 0–1.8)
BASOPHILS # BLD: 0.05 K/UL (ref 0–0.12)
EOSINOPHIL # BLD AUTO: 0.19 K/UL (ref 0–0.51)
EOSINOPHIL NFR BLD: 5.7 % (ref 0–6.9)
ERYTHROCYTE [DISTWIDTH] IN BLOOD BY AUTOMATED COUNT: 63.7 FL (ref 35.9–50)
ERYTHROCYTE [DISTWIDTH] IN BLOOD BY AUTOMATED COUNT: 64.2 FL (ref 35.9–50)
HCT VFR BLD AUTO: 26.1 % (ref 37–47)
HCT VFR BLD AUTO: 26.4 % (ref 37–47)
HGB BLD-MCNC: 8.2 G/DL (ref 12–16)
HGB BLD-MCNC: 8.5 G/DL (ref 12–16)
IMM GRANULOCYTES # BLD AUTO: 0.01 K/UL (ref 0–0.11)
IMM GRANULOCYTES NFR BLD AUTO: 0.3 % (ref 0–0.9)
LYMPHOCYTES # BLD AUTO: 0.76 K/UL (ref 1–4.8)
LYMPHOCYTES NFR BLD: 23 % (ref 22–41)
MAGNESIUM SERPL-MCNC: 1.6 MG/DL (ref 1.5–2.5)
MCH RBC QN AUTO: 28.4 PG (ref 27–33)
MCH RBC QN AUTO: 29.2 PG (ref 27–33)
MCHC RBC AUTO-ENTMCNC: 31.4 G/DL (ref 32.2–35.5)
MCHC RBC AUTO-ENTMCNC: 32.2 G/DL (ref 32.2–35.5)
MCV RBC AUTO: 90.3 FL (ref 81.4–97.8)
MCV RBC AUTO: 90.7 FL (ref 81.4–97.8)
MONOCYTES # BLD AUTO: 0.31 K/UL (ref 0–0.85)
MONOCYTES NFR BLD AUTO: 9.4 % (ref 0–13.4)
NEUTROPHILS # BLD AUTO: 1.99 K/UL (ref 1.82–7.42)
NEUTROPHILS NFR BLD: 60.1 % (ref 44–72)
NRBC # BLD AUTO: 0 K/UL
NRBC BLD-RTO: 0 /100 WBC (ref 0–0.2)
PLATELET # BLD AUTO: 67 K/UL (ref 164–446)
PLATELET # BLD AUTO: 70 K/UL (ref 164–446)
PLATELETS.RETICULATED NFR BLD AUTO: 1 % (ref 0.6–13.1)
PLATELETS.RETICULATED NFR BLD AUTO: 1.5 % (ref 0.6–13.1)
PMV BLD AUTO: 10 FL (ref 9–12.9)
PMV BLD AUTO: 9 FL (ref 9–12.9)
RBC # BLD AUTO: 2.89 M/UL (ref 4.2–5.4)
RBC # BLD AUTO: 2.91 M/UL (ref 4.2–5.4)
SIGNIFICANT IND 70042: NORMAL
SIGNIFICANT IND 70042: NORMAL
SITE SITE: NORMAL
SITE SITE: NORMAL
SODIUM SERPL-SCNC: 134 MMOL/L (ref 135–145)
SOURCE SOURCE: NORMAL
SOURCE SOURCE: NORMAL
WBC # BLD AUTO: 3.3 K/UL (ref 4.8–10.8)
WBC # BLD AUTO: 3.6 K/UL (ref 4.8–10.8)

## 2024-11-28 PROCEDURE — 700105 HCHG RX REV CODE 258

## 2024-11-28 PROCEDURE — A9270 NON-COVERED ITEM OR SERVICE: HCPCS | Performed by: INTERNAL MEDICINE

## 2024-11-28 PROCEDURE — 700102 HCHG RX REV CODE 250 W/ 637 OVERRIDE(OP)

## 2024-11-28 PROCEDURE — 99239 HOSP IP/OBS DSCHRG MGMT >30: CPT | Performed by: HOSPITALIST

## 2024-11-28 PROCEDURE — 700111 HCHG RX REV CODE 636 W/ 250 OVERRIDE (IP): Performed by: HOSPITALIST

## 2024-11-28 PROCEDURE — A9270 NON-COVERED ITEM OR SERVICE: HCPCS | Performed by: HOSPITALIST

## 2024-11-28 PROCEDURE — 700102 HCHG RX REV CODE 250 W/ 637 OVERRIDE(OP): Performed by: HOSPITALIST

## 2024-11-28 PROCEDURE — A9270 NON-COVERED ITEM OR SERVICE: HCPCS

## 2024-11-28 PROCEDURE — 700102 HCHG RX REV CODE 250 W/ 637 OVERRIDE(OP): Performed by: STUDENT IN AN ORGANIZED HEALTH CARE EDUCATION/TRAINING PROGRAM

## 2024-11-28 PROCEDURE — 84295 ASSAY OF SERUM SODIUM: CPT

## 2024-11-28 PROCEDURE — 85025 COMPLETE CBC W/AUTO DIFF WBC: CPT

## 2024-11-28 PROCEDURE — 83735 ASSAY OF MAGNESIUM: CPT

## 2024-11-28 PROCEDURE — 36415 COLL VENOUS BLD VENIPUNCTURE: CPT

## 2024-11-28 PROCEDURE — 85027 COMPLETE CBC AUTOMATED: CPT

## 2024-11-28 PROCEDURE — A9270 NON-COVERED ITEM OR SERVICE: HCPCS | Performed by: STUDENT IN AN ORGANIZED HEALTH CARE EDUCATION/TRAINING PROGRAM

## 2024-11-28 PROCEDURE — 85055 RETICULATED PLATELET ASSAY: CPT

## 2024-11-28 PROCEDURE — 700102 HCHG RX REV CODE 250 W/ 637 OVERRIDE(OP): Performed by: INTERNAL MEDICINE

## 2024-11-28 RX ORDER — LACTULOSE 10 G/15ML
30 SOLUTION ORAL 3 TIMES DAILY
Status: DISCONTINUED | OUTPATIENT
Start: 2024-11-28 | End: 2024-11-28 | Stop reason: HOSPADM

## 2024-11-28 RX ORDER — OXYCODONE HYDROCHLORIDE 10 MG/1
10 TABLET ORAL EVERY 6 HOURS PRN
Qty: 12 TABLET | Refills: 0 | Status: SHIPPED | OUTPATIENT
Start: 2024-11-28 | End: 2024-12-01

## 2024-11-28 RX ORDER — MAGNESIUM SULFATE HEPTAHYDRATE 40 MG/ML
2 INJECTION, SOLUTION INTRAVENOUS ONCE
Status: COMPLETED | OUTPATIENT
Start: 2024-11-28 | End: 2024-11-28

## 2024-11-28 RX ORDER — CARVEDILOL 3.12 MG/1
3.12 TABLET ORAL 2 TIMES DAILY WITH MEALS
Status: SHIPPED
Start: 2024-11-28

## 2024-11-28 RX ORDER — SODIUM CHLORIDE 9 MG/ML
500 INJECTION, SOLUTION INTRAVENOUS ONCE
Status: COMPLETED | OUTPATIENT
Start: 2024-11-28 | End: 2024-11-28

## 2024-11-28 RX ADMIN — OMEPRAZOLE 20 MG: 20 CAPSULE, DELAYED RELEASE ORAL at 06:21

## 2024-11-28 RX ADMIN — OXYCODONE HYDROCHLORIDE 10 MG: 10 TABLET ORAL at 06:28

## 2024-11-28 RX ADMIN — Medication 400 MG: at 06:21

## 2024-11-28 RX ADMIN — OXYCODONE HYDROCHLORIDE 10 MG: 10 TABLET ORAL at 12:33

## 2024-11-28 RX ADMIN — GABAPENTIN 400 MG: 400 CAPSULE ORAL at 06:22

## 2024-11-28 RX ADMIN — SODIUM CHLORIDE 500 ML: 9 INJECTION, SOLUTION INTRAVENOUS at 06:20

## 2024-11-28 RX ADMIN — LACTULOSE 30 ML: 10 SOLUTION ORAL; RECTAL at 11:08

## 2024-11-28 RX ADMIN — MAGNESIUM SULFATE HEPTAHYDRATE 2 G: 2 INJECTION, SOLUTION INTRAVENOUS at 11:07

## 2024-11-28 RX ADMIN — NYSTATIN: 100000 POWDER TOPICAL at 06:25

## 2024-11-28 RX ADMIN — LACTULOSE 30 ML: 10 SOLUTION ORAL; RECTAL at 06:21

## 2024-11-28 RX ADMIN — OXYCODONE HYDROCHLORIDE 10 MG: 10 TABLET ORAL at 02:02

## 2024-11-28 ASSESSMENT — FIBROSIS 4 INDEX: FIB4 SCORE: 5.71

## 2024-11-28 ASSESSMENT — PAIN DESCRIPTION - PAIN TYPE
TYPE: ACUTE PAIN
TYPE: CHRONIC PAIN

## 2024-11-28 NOTE — PROGRESS NOTES
Pt education provided, all questions answered. IV taken out, cardiac monitor removed. Pt had a safe discharge from the nursing unit via medical transport with all belongings.

## 2024-11-28 NOTE — DISCHARGE PLANNING
-1320  DPA confirmed with Rory at [facility] that facility can take pt back today and is OK with 1430 transport time.

## 2024-11-28 NOTE — PROGRESS NOTES
Bedside report received from off going RN/tech: Aleisha, assumed care of patient.     Fall Risk Score: MODERATE RISK  Fall risk interventions in place: Provide patient/family education based on risk assessment, Educate patient/family to call staff for assistance when getting out of bed, Place fall precaution signage outside patient door, Place patient in room close to nursing station, Utilize bed/chair fall alarm, and Bed alarm connected correctly  Bed type: Regular (Sincere Score less than 17 interventions in place)  Patient on cardiac monitor: Yes  IVF/IV medications: Not Applicable   Oxygen: Room Air  Bedside sitter: Not Applicable   Isolation: Not applicable

## 2024-11-28 NOTE — CARE PLAN
The patient is Stable - Low risk of patient condition declining or worsening    Shift Goals  Clinical Goals: Monitor hemodynamics, monitor labs  Patient Goals: Pain control, go home  Family Goals: EDMUND    Progress made toward(s) clinical / shift goals:      Problem: Pain - Standard  Goal: Alleviation of pain or a reduction in pain to the patient’s comfort goal  Description: Target End Date:  Prior to discharge or change in level of care    Document on Vitals flowsheet    1.  Document pain using the appropriate pain scale per order or unit policy  2.  Educate and implement non-pharmacologic comfort measures (i.e. relaxation, distraction, massage, cold/heat therapy, etc.)  3.  Pain management medications as ordered  4.  Reassess pain after pain med administration per policy  5.  If opiods administered assess patient's response to pain medication is appropriate per POSS sedation scale  6.  Follow pain management plan developed in collaboration with patient and interdisciplinary team (including palliative care or pain specialists if applicable)  Outcome: Met     Problem: Knowledge Deficit - Standard  Goal: Patient and family/care givers will demonstrate understanding of plan of care, disease process/condition, diagnostic tests and medications  Description: Target End Date:  1-3 days or as soon as patient condition allows    Document in Patient Education    1.  Patient and family/caregiver oriented to unit, equipment, visitation policy and means for communicating concern  2.  Complete/review Learning Assessment  3.  Assess knowledge level of disease process/condition, treatment plan, diagnostic tests and medications  4.  Explain disease process/condition, treatment plan, diagnostic tests and medications  Outcome: Met     Problem: Fall Risk  Goal: Patient will remain free from falls  Description: Target End Date:  Prior to discharge or change in level of care    Document interventions on the [facility] Fall Risk  Assessment    1.  Assess for fall risk factors  2.  Implement fall precautions  Outcome: Met     Problem: Communication  Goal: The ability to communicate needs accurately and effectively will improve  Outcome: Met     Problem: Safety  Goal: Will remain free from injury  Outcome: Met  Goal: Will remain free from falls  Outcome: Met     Problem: Pain Management  Goal: Pain level will decrease to patient's comfort goal  Outcome: Met     Problem: Infection  Goal: Will remain free from infection  Outcome: Met     Problem: Skin Integrity  Goal: Risk for impaired skin integrity will decrease  Outcome: Met     Problem: Mobility  Goal: Risk for activity intolerance will decrease  Outcome: Met     Problem: Skin Integrity  Goal: Skin integrity is maintained or improved  Description: Target End Date:  Prior to discharge or change in level of care    Document interventions on Skin Risk/Sincere flowsheet groups and corresponding LDA    1.  Assess and monitor skin integrity, appearance and/or temperature  2.  Assess risk factors for impaired skin integrity and/or pressures ulcers  3.  Implement precautions to protect skin integrity in collaboration with interdisciplinary team  4.  Implement pressure ulcer prevention protocol if at risk for skin breakdown  5.  Confirm wound care consult if at risk for skin breakdown  6.  Ensure patient use of pressure relieving devices  (Low air loss bed, waffle overlay, heel protectors, ROHO cushion, etc)  Outcome: Met     Problem: Knowledge Deficit  Goal: Knowledge of disease process/condition, treatment plan, diagnostic tests, and medications will improve  Outcome: Met

## 2024-11-28 NOTE — PROGRESS NOTES
Bedside report received from off going RN/tech: Maribeth, assumed care of patient.     Fall Risk Score: HIGH RISK  Fall risk interventions in place: Place yellow fall risk ID band on patient, Provide patient/family education based on risk assessment, Educate patient/family to call staff for assistance when getting out of bed, Place fall precaution signage outside patient door, Place patient in room close to nursing station, Utilize bed/chair fall alarm, Notify charge of high risk for huddle, and Bed alarm connected correctly  Bed type: Regular (Sincere Score less than 17 interventions in place)  Patient on cardiac monitor: Yes  IVF/IV medications: Not Applicable   Oxygen: Room Air  Bedside sitter: Not Applicable   Isolation: Not applicable    Pt resting,  no needs at this time, bed in low and locked position, call light within reach.

## 2024-11-28 NOTE — DISCHARGE SUMMARY
Discharge Summary    CHIEF COMPLAINT ON ADMISSION  Chief Complaint   Patient presents with    Abdominal Pain     BIB ems from [facility]. Pt had a ultrasound done there following some abdominal pain which resulted in a abdominal aortic aneurysm measuring 3.3cm.       Reason for Admission  EMS    Admission Date  11/23/2024     CODE STATUS  Full Code    HPI & HOSPITAL COURSE  Please see original H&P and consult for [place] information  64 y.o. female with extensive medical history including alcohol cirrhosis of the liver, chronic anemia, recent ESBL UTI, A-fib previously on Eliquis, GI bleeds, GERD, asthma, hypertension, HCV, type 2 diabetes, who presented 11/23/2024 with progressive abdominal pain.      Patient was in [facility] complaining of abdominal pain, ultrasound was performed negative for gallbladder concerns, did not show possible 3.3 cm abdominal aortic aneurysm and ascites.  Due to the ongoing abdominal pain that has been persistent for the past 2 weeks and history of cirrhosis she was sent to the ED.  Also reports nonbloody vomiting, has intermittent dizziness, also have been having some melanotic stools intermittently bloody.  CNA from facility in regards to her stool this morning notes they were not black, stools usually dark and tarry.  Abdominal pain is generalized, moderate to severe, generalized.  No fevers or chills headache or vision changes chest pain dyspnea cough dysuria or diarrhea.    Seen patient at bedside  Patient is lethargic, drowsy  Reports abd pain.   Paracentesis ordered to rule out SBP  ? Dark stool, check occult blood stool  Monitoring H&H  Afib, rate controlled      11/25 vitals stable on room air  WBC 3.7, Hb 8.7, plt 79  Renal function stable  Ammonia 99  CT abdomen pelvis showed no evidence of bowel obstruction, cirrhotic appearance of the liver with splenomegaly, gastric varices and small amount of ascites  Paracentesis unable to be performed as ascites was too small of  volume  Patient on lactulose TID, last BM 11/23.   Question of GI bleed on admit, hemoglobin stable however since varices visible on CT dc lovenox, increase omeprazole BID. Change BB to coreg to help with portal hypertension.  Discussed with GI who is planning EGD tomorrow.  Discussed with nursing, patient did have a large bowel movement this morning that was brown no melena or blood noted.  Will hold off on octreotide for now, if develops melena will start.  Patient reports she is feeling better today, mentation improving with lactulose              11/26 patient is resting in bed, patient is new to me today, patient denies any fever chills, no nausea no vomiting denies any chest pain palpitations, EGD was canceled today because patient ate, patient will be n.p.o. after midnight, clear liquid diet for today, note from GI reviewed, discussed with bedside nurse charge nurse  pharmacist, continue IV ceftriaxone, continue diuretics..  11/27 patient is resting in bed, she is alert oriented follows commands, she just came back from EGD, results discussed with patient and with patient's daughter, will continue monitoring overnight, hemoglobin stable, sodium decreased today, started on free water restriction, monitoring electrolytes, if stable hopefully will be able to DC tomorrow morning back to skilled nursing, all questions been answered discussed with  charge nurse bedside nurse.       Patient today is feeling much better, she is eager to go home she wants to return to skilled nursing facility, her hemoglobin earlier today was low but probably due to IV fluids earlier today, repeated CBC showed improvement in her hemoglobin and platelet count, patient without any signs of active bleeding, she is tolerating diet, she was having constipation but now she is better having bowel movements she felt that her bowel movements are at her normal right now, again patient is feeling well she is eager to go  back to skilled nursing facility, patient is stated that she mostly is bedbound and she moves to the chair sometimes, repeated blood pressure is improved 119/60, patient denies any dizziness lightheadedness chest pain palpitations, patient has been started on low-dose Coreg by GI, please follow holding parameters holding for SBP less than 105 and heart rate less than 70, again patient is recommended to follow-up with GI as outpatient, patient is being discharged today in stable condition, I have discussed with bedside nurse charge nurse  pharmacist all question have been answered.  Will complete 1 more day of oral Augmentin, cultures have been negative.    Therefore, she is discharged in good and stable condition to home with organized home healthcare and close outpatient follow-up.    The patient met 2-midnight criteria for an inpatient stay at the time of discharge.      FOLLOW UP ITEMS POST DISCHARGE  Primary care physician    DISCHARGE DIAGNOSES  Principal Problem (Resolved):    Atrial fibrillation with RVR (HCC) (POA: Yes)  Active Problems:    Alcoholic cirrhosis of liver (HCC) (POA: Yes)    Thrombocytopenia (HCC) (POA: Yes)    Hypertension (POA: Yes)    Paroxysmal atrial fibrillation (POA: Yes)    Dyslipidemia (POA: Yes)    GERD (gastroesophageal reflux disease) (POA: Yes)    Type 2 diabetes mellitus with diabetic neuropathy, with long-term current use of insulin (HCC) (POA: Yes)    Mild intermittent asthma without complication (POA: Yes)    Arthritis (POA: Unknown)    Narcotic dependence (HCC) (POA: Yes)    Portal hypertension (HCC) (POA: Unknown)    Seizures (HCC) (POA: Unknown)  Resolved Problems:    Hepatic encephalopathy (HCC) (POA: Yes)    Bloody stool (POA: Unknown)    SBP (spontaneous bacterial peritonitis) (HCC) (POA: Unknown)    Advance care planning (POA: Unknown)      FOLLOW UP  No future appointments.  [facility]  [address]  [id number]  [phone]          MEDICATIONS ON DISCHARGE     Medication List        START taking these medications        Instructions   amoxicillin-clavulanate 875-125 MG Tabs  Commonly known as: Augmentin   Take 1 Tablet by mouth every 12 hours for 1 day.  Dose: 1 Tablet     carvedilol 3.125 MG Tabs  Commonly known as: Coreg   Take 1 Tablet by mouth 2 times a day with meals.  Dose: 3.125 mg            CONTINUE taking these medications        Instructions   acetaminophen 325 MG Tabs  Commonly known as: Tylenol   Take 650 mg by mouth every 6 hours as needed for Mild Pain.  Dose: 650 mg     albuterol 108 (90 Base) MCG/ACT Aers inhalation aerosol   Inhale 2 Puffs every four hours as needed for Shortness of Breath.  Dose: 2 Puff     ascorbic acid 500 MG Tabs  Commonly known as: Ascorbic Acid   Take 500 mg by mouth every morning.  Dose: 500 mg     atorvastatin 20 MG Tabs  Commonly known as: Lipitor   Take 1 Tablet by mouth every evening.  Dose: 20 mg     bisacodyl 10 MG Supp  Commonly known as: Dulcolax   Insert 10 mg into the rectum 1 time a day as needed. Indications: Evacuation of Material from the Bowel  Dose: 10 mg     ferrous sulfate 325 (65 Fe) MG tablet   Take 325 mg by mouth 2 times a day.  Dose: 325 mg     furosemide 40 MG Tabs  Commonly known as: Lasix   Take 1 Tablet by mouth every day.  Dose: 40 mg     gabapentin 400 MG Caps  Commonly known as: Neurontin   Take 400 mg by mouth 2 times a day.  Dose: 400 mg     glucagon 1 mg Solr   Infuse 1 mg into a venous catheter one time.  Dose: 1 mg     glucose 77.4 % Gel   Take 1 Tube by mouth see administration instructions. For BS < 60  Dose: 1 Tube     insulin regular 100 Unit/mL Soln  Commonly known as: HumuLIN R/NovoLIN R   Inject 1-10 Units under the skin 4 Times a Day,Before Meals and at Bedtime. Inject per sliding scale. For blood glucose:  151 - 200 = 1 unit  201 - 250 = 3 units  251 - 300 = 5 units  301 - 350 = 7 units  351 - 400 = 8 units  If >400 = 10 units and  oliver COLLINS  Dose: 1-10 Units     lactulose 20 GM/30ML Soln   Take 30 mL by mouth 3 times a day. Hold dose if 4 or more soft or loose stools in the last 24 hours, please gradually increase amount per dose by 5mls if you do not have at least 2 stools per day.  Dose: 30 mL     magnesium hydroxide 400 MG/5ML Susp  Commonly known as: Milk Of Magnesia   Take 30 mL by mouth 1 time a day as needed. Indications: Constipation  Dose: 30 mL     magnesium oxide 400 MG Tabs tablet  Commonly known as: Mag-Ox   Take 400 mg by mouth every morning.  Dose: 400 mg     oxyCODONE immediate release 10 MG immediate release tablet  Commonly known as: Roxicodone   Take 10 mg by mouth every 6 hours as needed for Moderate Pain.  Dose: 10 mg     pantoprazole 20 MG tablet  Commonly known as: Protonix   Take 20 mg by mouth every morning.  Dose: 20 mg     spironolactone 50 MG Tabs  Commonly known as: Aldactone   Take 50 mg by mouth every day.  Dose: 50 mg            STOP taking these medications      insulin GLARGINE 100 UNIT/ML Soln  Commonly known as: Lantus,Semglee     metFORMIN 500 MG Tabs  Commonly known as: Glucophage     metoprolol SR 25 MG Tb24  Commonly known as: Toprol XL     nitazoxanide 500 MG tablet  Commonly known as: Alinia     potassium chloride SA 20 MEQ Tbcr  Commonly known as: Kdur     riFAXIMin 550 MG Tabs tablet  Commonly known as: Xifaxan     sodium phosphate 7-19 GM/118ML Enem              Allergies  Allergies   Allergen Reactions    Meropenem Swelling     Pt had rxt to meropenem June 2024 (marked tongue swelling and right facial/periorbital edema)    Tuberculin Tests Unspecified     Per MAR from [facility]       DIET  Orders Placed This Encounter   Procedures    Diet Order Diet: 2 Gram Sodium; Fluid modifications: (optional): Free Water Restrictions Only     Standing Status:   Standing     Number of Occurrences:   1     Order Specific Question:   Diet:     Answer:   2 Gram Sodium [7]     Order Specific Question:   Fluid  modifications: (optional)     Answer:   Free Water Restrictions Only [12]       ACTIVITY  As tolerated.  Weight bearing as tolerated    LINES, DRAINS, AND WOUNDS  This is an automated list. Peripheral IVs will be removed prior to discharge.  Peripheral IV 11/23/24 18 G Right;Upper Upper arm (Active)   Site Assessment Clean;Dry;Intact 11/28/24 0900   Dressing Type Transparent 11/28/24 0900   Line Status Scrubbed the hub prior to access;Flushed;Saline locked 11/28/24 0900   Dressing Status Clean;Dry;Intact 11/28/24 0900   Dressing Intervention N/A 11/27/24 2000   Dressing Change Due 11/30/24 11/24/24 2055   Infiltration Grading ([facility]) 0 11/27/24 2000   Phlebitis Scale ([facility] Only) 0 11/27/24 2000       Wound 11/25/24 Pannus Anterior (Active)   Wound Image   11/26/24 1930   Site Assessment Pink;Red;Excoriated 11/28/24 0745   Periwound Assessment Clean;Dry 11/28/24 0745   Margins Defined edges 11/28/24 0745   Closure Open to air 11/28/24 0745   Drainage Amount Scant 11/28/24 0745   Drainage Description Serosanguineous 11/28/24 0745   Treatments Cleansed;Site care 11/28/24 0745   Wound Cleansing Foam Cleanser/Washcloth 11/28/24 0745   Periwound Protectant Antifungal Therapy 11/28/24 0745   Dressing Status Open to Air 11/28/24 0745   Dressing Changed New 11/26/24 1930   Dressing Options Open to Air 11/27/24 2000       Peripheral IV 11/23/24 18 G Right;Upper Upper arm (Active)   Site Assessment Clean;Dry;Intact 11/28/24 0900   Dressing Type Transparent 11/28/24 0900   Line Status Scrubbed the hub prior to access;Flushed;Saline locked 11/28/24 0900   Dressing Status Clean;Dry;Intact 11/28/24 0900   Dressing Intervention N/A 11/27/24 2000   Dressing Change Due 11/30/24 11/24/24 2055   Infiltration Grading ([facility]) 0 11/27/24 2000   Phlebitis Scale ([facility] Only) 0 11/27/24 2000               MENTAL STATUS ON TRANSFER             CONSULTATIONS  GI    PROCEDURES  EGD    LABORATORY  Lab Results   Component Value  Date    SODIUM 134 (L) 11/28/2024    POTASSIUM 4.4 11/27/2024    CHLORIDE 105 11/27/2024    CO2 20 11/27/2024    GLUCOSE 94 11/27/2024    BUN 30 (H) 11/27/2024    CREATININE 0.74 11/27/2024    CREATININE 0.9 03/12/2009        Lab Results   Component Value Date    WBC 3.6 (L) 11/28/2024    HEMOGLOBIN 8.5 (L) 11/28/2024    HEMATOCRIT 26.4 (L) 11/28/2024    PLATELETCT 70 (L) 11/28/2024        Total time of the discharge process exceeds 32 minutes.

## 2024-11-28 NOTE — PROGRESS NOTES
On call hospitalistKonrad notified of BP of 86/58, MAP 67. Orders placed by hospitalist for 500 mL NS bolus. Bolus given. BP rechecked with day shift RN, BP came up to 97/58, MAP 71.

## 2024-11-28 NOTE — DISCHARGE PLANNING
Case Management Discharge Planning    Admission Date: 11/23/2024  GMLOS: 4.5  ALOS: 3    6-Clicks ADL Score: 21  6-Clicks Mobility Score: 12  PT and/or OT Eval ordered: Yes  Post-acute Referrals Ordered: Yes  Post-acute Choice Obtained: Yes  Has referral(s) been sent to post-acute provider:  Yes    Anticipated Discharge Dispo: Discharge Disposition: D/T to SNF with Medicare cert in anticipation of skilled care (03)  Discharge Address: [address] [place] [id number]  Discharge Contact Phone Number: [phone]    DME Needed: No    Action(s) Taken: Patient discussed in AM rounds, not medically cleared for discharge yet. Pt has low blood pressures; she will be treated and possibly ready for discharge later today.     RNCM called Wild with [facility] to add patient on will call.     13:15 PM-  patient has now been MC by MD for discharge to SNF. RNCM contacted [facility] transport and spoke to [facility] who confirmed they can  patient at 1430 today.     [facility] and [facility]RAT-MAPI works for them. RNCM met with patient at bedside to discuss, notified pt of MC and transport time. Pt stated she is happy to D/C to [facility] and looks forward to spending time with her daughter later today.     RNCM called and spoke with pt's daughter Ava. Notified her of MC and transport time. She is agreeable with DCP.     RNCM prepared [facility] Packet and gave it to bedside nurse at 1334 PM.     Escalations Completed: None    Medically Clear: No    Next Steps: [facility] will transport patient to [facility] at 1430 PM.      Barriers to Discharge: Medical clearance    Is the patient up for discharge tomorrow: No

## 2024-12-04 ENCOUNTER — APPOINTMENT (OUTPATIENT)
Dept: PHYSICAL THERAPY | Facility: REHABILITATION | Age: 64
End: 2024-12-04
Attending: INTERNAL MEDICINE
Payer: MEDICAID

## 2024-12-11 ENCOUNTER — APPOINTMENT (OUTPATIENT)
Dept: PHYSICAL THERAPY | Facility: REHABILITATION | Age: 64
End: 2024-12-11
Attending: INTERNAL MEDICINE
Payer: MEDICAID

## 2024-12-18 ENCOUNTER — APPOINTMENT (OUTPATIENT)
Dept: RADIOLOGY | Facility: MEDICAL CENTER | Age: 64
End: 2024-12-18
Attending: STUDENT IN AN ORGANIZED HEALTH CARE EDUCATION/TRAINING PROGRAM
Payer: MEDICAID

## 2024-12-18 ENCOUNTER — HOSPITAL ENCOUNTER (EMERGENCY)
Facility: MEDICAL CENTER | Age: 64
End: 2024-12-18
Attending: STUDENT IN AN ORGANIZED HEALTH CARE EDUCATION/TRAINING PROGRAM
Payer: MEDICAID

## 2024-12-18 VITALS
HEIGHT: 68 IN | DIASTOLIC BLOOD PRESSURE: 52 MMHG | TEMPERATURE: 97.3 F | WEIGHT: 206 LBS | RESPIRATION RATE: 17 BRPM | OXYGEN SATURATION: 96 % | BODY MASS INDEX: 31.22 KG/M2 | SYSTOLIC BLOOD PRESSURE: 90 MMHG | HEART RATE: 78 BPM

## 2024-12-18 DIAGNOSIS — D64.9 CHRONIC ANEMIA: ICD-10-CM

## 2024-12-18 DIAGNOSIS — I50.9 ACUTE ON CHRONIC CONGESTIVE HEART FAILURE, UNSPECIFIED HEART FAILURE TYPE (HCC): ICD-10-CM

## 2024-12-18 DIAGNOSIS — J90 PLEURAL EFFUSION: ICD-10-CM

## 2024-12-18 DIAGNOSIS — R79.89 ELEVATED TROPONIN: ICD-10-CM

## 2024-12-18 DIAGNOSIS — R06.02 SOB (SHORTNESS OF BREATH): ICD-10-CM

## 2024-12-18 LAB
ALBUMIN SERPL BCP-MCNC: 2.3 G/DL (ref 3.2–4.9)
ALBUMIN/GLOB SERPL: 0.5 G/DL
ALP SERPL-CCNC: 207 U/L (ref 30–99)
ALT SERPL-CCNC: 10 U/L (ref 2–50)
ANION GAP SERPL CALC-SCNC: 8 MMOL/L (ref 7–16)
AST SERPL-CCNC: 17 U/L (ref 12–45)
BASOPHILS # BLD AUTO: 0.9 % (ref 0–1.8)
BASOPHILS # BLD: 0.04 K/UL (ref 0–0.12)
BILIRUB SERPL-MCNC: 0.6 MG/DL (ref 0.1–1.5)
BUN SERPL-MCNC: 32 MG/DL (ref 8–22)
CALCIUM ALBUM COR SERPL-MCNC: 9.1 MG/DL (ref 8.5–10.5)
CALCIUM SERPL-MCNC: 7.7 MG/DL (ref 8.5–10.5)
CHLORIDE SERPL-SCNC: 110 MMOL/L (ref 96–112)
CO2 SERPL-SCNC: 18 MMOL/L (ref 20–33)
CREAT SERPL-MCNC: 0.81 MG/DL (ref 0.5–1.4)
D DIMER PPP IA.FEU-MCNC: 7.31 UG/ML (FEU) (ref 0–0.5)
EKG IMPRESSION: NORMAL
EOSINOPHIL # BLD AUTO: 0.31 K/UL (ref 0–0.51)
EOSINOPHIL NFR BLD: 7.2 % (ref 0–6.9)
ERYTHROCYTE [DISTWIDTH] IN BLOOD BY AUTOMATED COUNT: 56.8 FL (ref 35.9–50)
FLUAV RNA SPEC QL NAA+PROBE: NEGATIVE
FLUBV RNA SPEC QL NAA+PROBE: NEGATIVE
GFR SERPLBLD CREATININE-BSD FMLA CKD-EPI: 81 ML/MIN/1.73 M 2
GLOBULIN SER CALC-MCNC: 4.4 G/DL (ref 1.9–3.5)
GLUCOSE SERPL-MCNC: 113 MG/DL (ref 65–99)
HCT VFR BLD AUTO: 25.5 % (ref 37–47)
HGB BLD-MCNC: 8.2 G/DL (ref 12–16)
IMM GRANULOCYTES # BLD AUTO: 0.01 K/UL (ref 0–0.11)
IMM GRANULOCYTES NFR BLD AUTO: 0.2 % (ref 0–0.9)
LYMPHOCYTES # BLD AUTO: 1.03 K/UL (ref 1–4.8)
LYMPHOCYTES NFR BLD: 24.1 % (ref 22–41)
MCH RBC QN AUTO: 29.1 PG (ref 27–33)
MCHC RBC AUTO-ENTMCNC: 32.2 G/DL (ref 32.2–35.5)
MCV RBC AUTO: 90.4 FL (ref 81.4–97.8)
MONOCYTES # BLD AUTO: 0.46 K/UL (ref 0–0.85)
MONOCYTES NFR BLD AUTO: 10.7 % (ref 0–13.4)
NEUTROPHILS # BLD AUTO: 2.43 K/UL (ref 1.82–7.42)
NEUTROPHILS NFR BLD: 56.9 % (ref 44–72)
NRBC # BLD AUTO: 0 K/UL
NRBC BLD-RTO: 0 /100 WBC (ref 0–0.2)
NT-PROBNP SERPL IA-MCNC: 2036 PG/ML (ref 0–125)
PLATELET # BLD AUTO: 68 K/UL (ref 164–446)
PLATELETS.RETICULATED NFR BLD AUTO: 0.7 % (ref 0.6–13.1)
PMV BLD AUTO: 9.4 FL (ref 9–12.9)
POTASSIUM SERPL-SCNC: 4.4 MMOL/L (ref 3.6–5.5)
PROT SERPL-MCNC: 6.7 G/DL (ref 6–8.2)
RBC # BLD AUTO: 2.82 M/UL (ref 4.2–5.4)
RSV RNA SPEC QL NAA+PROBE: NEGATIVE
SARS-COV-2 RNA RESP QL NAA+PROBE: NOTDETECTED
SODIUM SERPL-SCNC: 136 MMOL/L (ref 135–145)
TROPONIN T SERPL-MCNC: 22 NG/L (ref 6–19)
WBC # BLD AUTO: 4.3 K/UL (ref 4.8–10.8)

## 2024-12-18 PROCEDURE — 85379 FIBRIN DEGRADATION QUANT: CPT

## 2024-12-18 PROCEDURE — 85055 RETICULATED PLATELET ASSAY: CPT

## 2024-12-18 PROCEDURE — 0241U HCHG SARS-COV-2 COVID-19 NFCT DS RESP RNA 4 TRGT ED POC: CPT

## 2024-12-18 PROCEDURE — 71045 X-RAY EXAM CHEST 1 VIEW: CPT

## 2024-12-18 PROCEDURE — A9270 NON-COVERED ITEM OR SERVICE: HCPCS | Mod: UD | Performed by: STUDENT IN AN ORGANIZED HEALTH CARE EDUCATION/TRAINING PROGRAM

## 2024-12-18 PROCEDURE — 700117 HCHG RX CONTRAST REV CODE 255: Mod: UD | Performed by: STUDENT IN AN ORGANIZED HEALTH CARE EDUCATION/TRAINING PROGRAM

## 2024-12-18 PROCEDURE — 99285 EMERGENCY DEPT VISIT HI MDM: CPT

## 2024-12-18 PROCEDURE — 85025 COMPLETE CBC W/AUTO DIFF WBC: CPT

## 2024-12-18 PROCEDURE — 36415 COLL VENOUS BLD VENIPUNCTURE: CPT

## 2024-12-18 PROCEDURE — 84484 ASSAY OF TROPONIN QUANT: CPT

## 2024-12-18 PROCEDURE — 700111 HCHG RX REV CODE 636 W/ 250 OVERRIDE (IP): Mod: JZ,UD | Performed by: STUDENT IN AN ORGANIZED HEALTH CARE EDUCATION/TRAINING PROGRAM

## 2024-12-18 PROCEDURE — 93005 ELECTROCARDIOGRAM TRACING: CPT | Mod: TC | Performed by: STUDENT IN AN ORGANIZED HEALTH CARE EDUCATION/TRAINING PROGRAM

## 2024-12-18 PROCEDURE — 700102 HCHG RX REV CODE 250 W/ 637 OVERRIDE(OP): Mod: UD | Performed by: STUDENT IN AN ORGANIZED HEALTH CARE EDUCATION/TRAINING PROGRAM

## 2024-12-18 PROCEDURE — 96374 THER/PROPH/DIAG INJ IV PUSH: CPT | Mod: XU

## 2024-12-18 PROCEDURE — 71275 CT ANGIOGRAPHY CHEST: CPT

## 2024-12-18 PROCEDURE — 83880 ASSAY OF NATRIURETIC PEPTIDE: CPT

## 2024-12-18 PROCEDURE — 80053 COMPREHEN METABOLIC PANEL: CPT

## 2024-12-18 RX ORDER — OXYCODONE AND ACETAMINOPHEN 5; 325 MG/1; MG/1
1 TABLET ORAL ONCE
Status: COMPLETED | OUTPATIENT
Start: 2024-12-18 | End: 2024-12-18

## 2024-12-18 RX ORDER — FUROSEMIDE 10 MG/ML
80 INJECTION INTRAMUSCULAR; INTRAVENOUS ONCE
Status: COMPLETED | OUTPATIENT
Start: 2024-12-18 | End: 2024-12-18

## 2024-12-18 RX ADMIN — IOHEXOL 100 ML: 350 INJECTION, SOLUTION INTRAVENOUS at 03:34

## 2024-12-18 RX ADMIN — FUROSEMIDE 80 MG: 10 INJECTION, SOLUTION INTRAVENOUS at 02:44

## 2024-12-18 RX ADMIN — OXYCODONE HYDROCHLORIDE AND ACETAMINOPHEN 1 TABLET: 5; 325 TABLET ORAL at 02:34

## 2024-12-18 ASSESSMENT — FIBROSIS 4 INDEX: FIB4 SCORE: 5.87

## 2024-12-18 NOTE — DISCHARGE PLANNING
Medical Social Work    MSW received a voalte message from bedside RN that pt is ready to return to [facility].  RN has called nurse to nurse and [facility] is ready to accept pt back; room 316-2.  MSW contacted Seven with [facility] to arrange stretcher transport.  PCS and facesheet faxed to [facility] and made follow up phone call confirming ride for 0600.  COBRA and transfer packet complete and given to RN.  RN made aware of transport time.

## 2024-12-18 NOTE — ED TRIAGE NOTES
[facility]  64 [facility]    Chief Complaint   Patient presents with    Shortness of Breath     Pt arrives via EMS from [facility] with complains of SOB x2 days. Pt reportedly had episode of hypotension two days ago at  and was given 1 L IVF per medics. Pt denies cough/URI symptoms. Reports occasional chest pressure. Denies chest pain on arrival. Hx of A-fib. A&Ox4

## 2024-12-18 NOTE — ED NOTES
SUZI Lynne informed about the transport; she will arrange patient transport going back to [place]

## 2024-12-18 NOTE — ED NOTES
Left per jean with [facility] on room air; all belongings with patient; AOX4 GCS15    Pt discharged to SNF. Discharge paperwork provided. Education provided by ERP. Reinforced discharge instructions.  Pt was given follow up instructions.  Pt verbalized understanding of all instructions for discharge.

## 2024-12-18 NOTE — ED PROVIDER NOTES
ED Provider Note    CHIEF COMPLAINT  Chief Complaint   Patient presents with    Shortness of Breath       EXTERNAL RECORDS REVIEWED  Inpatient Notes discharge summary on 11/28/2024 following admission for abdominal pain in the context of ascites, abdominal aortic aneurysm    HPI/ROS  LIMITATION TO HISTORY   Select: : None  OUTSIDE HISTORIAN(S):    April Alicia is a 64 y.o. female who presents with shortness of breath for the past 2 days.  Patient endorses occasional chest pressure.  Patient denies nausea vomiting diarrhea.  Patient denies abdominal pain.  Patient endorses chronic pain for which she takes oxycodone.  Patient reports right lower extremity swelling which is more than her baseline.  Patient denies runny nose, sore throat, cough.    PAST MEDICAL HISTORY   has a past medical history of Abnormal finding of lung (12/27/2022), Acute exacerbation of chronic obstructive pulmonary disease (COPD) ([facility]) (01/27/2020), Alcoholic cirrhosis ([facility]), Arthritis, ASTHMA, Atrial fibrillation ([facility]), Dry eyes (11/16/2017), Edentulous, Emphysema of lung ([facility]), H/O: hysterectomy (12/05/2019), Heart burn, Hepatic encephalopathy ([facility]) (12/27/2022), Hepatitis C infection (07/28/2022), History of rheumatic fever (09/04/2017), Hypertension, 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[facility]), Medication overuse headache (08/15/2017), Mitral regurgitation, Pancytopenia ([facility]) (07/26/2022), Pneumonia (02/2020), Primary osteoarthritis of left knee (08/25/2020), Prosthetic joint infection ([facility]) (2018), Protein-calorie malnutrition, severe ([facility]) (08/01/2022), Right leg DVT ([facility]) (2018), Seizure disorder ([facility]), Septic arthritis of knee, left ([facility]) (07/19/2022), Septic arthritis of shoulder, left ([facility]) (07/17/2022), Septic shock due to Pseudomonas species ([facility]) (10/30/2023), and Type 2 diabetes mellitus ([facility]) (12/27/2022).    SURGICAL HISTORY   has a past surgical history that includes gyn surgery (1982); gyn surgery (2003); colonoscopy with  clipping (10/28/2015); colonoscopy with sclerotherapy (10/28/2015); colonoscopy with tattooing (10/28/2015); irrigation & debridement ortho (Right, 9/3/2017); knee arthroplasty total (Right, 2018); total knee arthroplasty (Left, 2020); irrigation & debridement general (Left, 2022); revise knee joint replace,all parts (Left, 2022); revise knee joint replace,all parts (Left, 2022); irrigation & debridement general (Left, 2022); total knee arthroplasty (Left, 10/30/2023); knee amputation above (Left, 2024); and upper gi endoscopy,diagnosis (N/A, 2024).    FAMILY HISTORY  Family History   Problem Relation Age of Onset    Diabetes Mother     Seizures Father     Heart Attack Father 45    Diabetes Brother     Alcohol abuse Brother     Dementia Brother     Diabetes Brother        SOCIAL HISTORY  Social History     Tobacco Use    Smoking status: Former     Current packs/day: 0.00     Types: Cigarettes     Quit date: 2016     Years since quittin.9    Smokeless tobacco: Former     Quit date: 2021    Tobacco comments:     a few a day when I can   Vaping Use    Vaping status: Never Used   Substance and Sexual Activity    Alcohol use: Not Currently     Comment: hx of AUD    Drug use: Not Currently    Sexual activity: Not Currently     Partners: Male     Birth control/protection: Post-Menopausal     Comment: Has boyfriend, not currently sexually active       CURRENT MEDICATIONS  Home Medications       Reviewed by VENKATA BeauchampNErrol (Registered Nurse) on 24 at 0147  Med List Status: Not Addressed     Medication Last Dose Status   acetaminophen (TYLENOL) 325 MG Tab  Active   albuterol 108 (90 Base) MCG/ACT Aero Soln inhalation aerosol  Active   ascorbic acid (ASCORBIC ACID) 500 MG Tab  Active   atorvastatin (LIPITOR) 20 MG Tab  Active   bisacodyl (DULCOLAX) 10 MG Suppos  Active   carvedilol (COREG) 3.125 MG Tab  Active   Dextrose, Diabetic Use, (GLUCOSE) 77.4 % Gel  Active  "  ferrous sulfate 325 (65 Fe) MG tablet  Active   furosemide (LASIX) 40 MG Tab  Active   gabapentin (NEURONTIN) 400 MG Cap  Active   glucagon 1 mg Recon Soln  Active   insulin regular (HUMULIN R/NOVOLIN R) 100 Unit/mL Solution  Active   lactulose 20 GM/30ML Solution  Active   magnesium hydroxide (MILK OF MAGNESIA) 400 MG/5ML Suspension  Active   magnesium oxide (MAG-OX) 400 MG Tab tablet  Active   pantoprazole (PROTONIX) 20 MG tablet  Active   spironolactone (ALDACTONE) 50 MG Tab  Active                  Audit from Redirected Encounters    **Home medications have not yet been reviewed for this encounter**         ALLERGIES  Allergies   Allergen Reactions    Meropenem Swelling     Pt had rxt to meropenem June 2024 (marked tongue swelling and right facial/periorbital edema)    Tuberculin Tests Unspecified     Per MAR from [facility]       PHYSICAL EXAM  VITAL SIGNS: BP 90/64   Pulse 87   Temp 37.2 °C (98.9 °F) (Oral)   Resp 12   Ht 1.727 m (5' 8\")   Wt 93.4 kg (206 lb)   LMP 06/02/2008   SpO2 96%   BMI 31.32 kg/m²    Vitals and nursing note reviewed.   Constitutional:       Comments: Patient is lying in bed supine, pleasant, conversant, speaking in complete sentences   HENT:      Head: Normocephalic and atraumatic.   Eyes:      Extraocular Movements: Extraocular movements intact.      Conjunctiva/sclera: Conjunctivae normal.      Pupils: Pupils are equal, round, and reactive to light.   Cardiovascular:      Pulses: Normal pulses.      Comments: HR 80  Pulmonary:      Effort: Pulmonary effort is normal. No respiratory distress.   No wheezes rales or rhonchi  Musculoskeletal:         General: No swelling. Normal range of motion.      Cervical back: Normal range of motion. No rigidity.   Skin:     General: Skin is warm and dry.      Capillary Refill: Capillary refill takes less than 2 seconds.   Neurological:      Mental Status: Alert.     RADIOLOGY/PROCEDURES   I have independently interpreted the diagnostic " imaging associated with this visit and am waiting the final reading from the radiologist.   My preliminary interpretation is as follows: No PE    Radiologist interpretation:  CT-CTA CHEST PULMONARY ARTERY W/ RECONS   Final Result         1.  No large central pulmonary embolus is appreciated, evaluation of the subsegmental branches is essentially nondiagnostic due to motion artifacts. Additional imaging would be required for definitive exclusion of small distal pulmonary emboli.   2.  Small right and trace left layering pleural effusions   3.  Changes of cirrhosis   4.  Atherosclerosis and atherosclerotic coronary artery disease.      DX-CHEST-PORTABLE (1 VIEW)   Final Result         1.  Pulmonary edema and/or infiltrates.   2.  Cardiomegaly   3.  Atherosclerosis          COURSE & MEDICAL DECISION MAKING    ASSESSMENT, COURSE AND PLAN  Care Narrative:  CBC to evaluate for acute anemia and leukocytosis.  CMP to evaluate for acute electrolyte abnormality, acute kidney injury, acute liver failure or dysfunction.  Chest x-ray to evaluate for acute cardiopulmonary process such as pneumonia, pulmonary edema, pneumothorax.  D-dimer to rule out PE.  EKG, troponin to evaluate for ACS versus arrhythmia.  Percocet for chronic pain.    Electronically signed by: Jerome Hernandez M.D., 12/18/2024 2:19 AM    Troponin mildly elevated generally at baseline, BNP somewhat elevated.  Results are consistent with mild heart failure exacerbation.  Patient given IV furosemide and reports breathing is improved.  Patient is not hypoxemic.  Patient does have small pleural effusions and infiltrates consistent with her congestive heart failure.  Patient was offered admission but has declined.  Respiratory viral panel negative for flu, COVID, RSV.  Elevated D-dimer but CT pulmonary angiography negative for PE.    This dictation has been created using voice recognition software. I am continuously working with the software to minimize the  number of voice recognition errors and I have made every attempt to manually correct the errors within my dictation. However errors  related to this voice recognition software may still exist and should be interpreted within the appropriate context.     Electronically signed by: Jerome Hernandez M.D., 12/18/2024 5:01 AM    HEART SCORE:  History - 0  EKG - 0  Age - 1  Risk Factors - 1  Initial Troponin - 1  Total - 3      DISPOSITION AND DISCUSSIONS  Escalation of care considered, and ultimately not performed:after discussion with the patient / family, they have elected to decline an escalation in care      Decision tools and prescription drugs considered including, but not limited to: Antibiotics not indicated in the absence of bacterial infection .    FINAL DIAGNOSIS  1. Acute on chronic congestive heart failure, unspecified heart failure type (HCC)    2. Pleural effusion    3. SOB (shortness of breath)    4. Elevated troponin    5. Chronic anemia         Electronically signed by: Jerome Hernandez M.D., 12/18/2024 2:18 AM

## 2024-12-26 ENCOUNTER — APPOINTMENT (OUTPATIENT)
Dept: RADIOLOGY | Facility: MEDICAL CENTER | Age: 64
End: 2024-12-26
Attending: EMERGENCY MEDICINE
Payer: MEDICAID

## 2024-12-26 ENCOUNTER — HOSPITAL ENCOUNTER (EMERGENCY)
Facility: MEDICAL CENTER | Age: 64
End: 2024-12-26
Attending: EMERGENCY MEDICINE
Payer: MEDICAID

## 2024-12-26 VITALS
SYSTOLIC BLOOD PRESSURE: 126 MMHG | WEIGHT: 205.91 LBS | RESPIRATION RATE: 13 BRPM | TEMPERATURE: 98.1 F | DIASTOLIC BLOOD PRESSURE: 84 MMHG | HEART RATE: 71 BPM | OXYGEN SATURATION: 97 % | BODY MASS INDEX: 31.21 KG/M2 | HEIGHT: 68 IN

## 2024-12-26 DIAGNOSIS — R19.7 NAUSEA VOMITING AND DIARRHEA: ICD-10-CM

## 2024-12-26 DIAGNOSIS — R11.2 NAUSEA VOMITING AND DIARRHEA: ICD-10-CM

## 2024-12-26 LAB
ALBUMIN SERPL BCP-MCNC: 2.8 G/DL (ref 3.2–4.9)
ALBUMIN/GLOB SERPL: 0.6 G/DL
ALP SERPL-CCNC: 203 U/L (ref 30–99)
ALT SERPL-CCNC: 11 U/L (ref 2–50)
AMMONIA PLAS-SCNC: 76 UMOL/L (ref 11–45)
ANION GAP SERPL CALC-SCNC: 8 MMOL/L (ref 7–16)
APPEARANCE UR: CLEAR
APTT PPP: 42.2 SEC (ref 24.7–36)
AST SERPL-CCNC: 29 U/L (ref 12–45)
BACTERIA #/AREA URNS HPF: ABNORMAL /HPF
BASOPHILS # BLD AUTO: 1.1 % (ref 0–1.8)
BASOPHILS # BLD: 0.05 K/UL (ref 0–0.12)
BILIRUB SERPL-MCNC: 0.9 MG/DL (ref 0.1–1.5)
BILIRUB UR QL STRIP.AUTO: NEGATIVE
BUN SERPL-MCNC: 42 MG/DL (ref 8–22)
CALCIUM ALBUM COR SERPL-MCNC: 9.4 MG/DL (ref 8.5–10.5)
CALCIUM SERPL-MCNC: 8.4 MG/DL (ref 8.5–10.5)
CASTS URNS QL MICRO: ABNORMAL /LPF (ref 0–2)
CHLORIDE SERPL-SCNC: 111 MMOL/L (ref 96–112)
CO2 SERPL-SCNC: 18 MMOL/L (ref 20–33)
COLOR UR: YELLOW
CREAT SERPL-MCNC: 1.23 MG/DL (ref 0.5–1.4)
EOSINOPHIL # BLD AUTO: 0.2 K/UL (ref 0–0.51)
EOSINOPHIL NFR BLD: 4.5 % (ref 0–6.9)
EPITHELIAL CELLS 1715: ABNORMAL /HPF (ref 0–5)
ERYTHROCYTE [DISTWIDTH] IN BLOOD BY AUTOMATED COUNT: 55.3 FL (ref 35.9–50)
FLUAV RNA SPEC QL NAA+PROBE: NEGATIVE
FLUBV RNA SPEC QL NAA+PROBE: NEGATIVE
GFR SERPLBLD CREATININE-BSD FMLA CKD-EPI: 49 ML/MIN/1.73 M 2
GLOBULIN SER CALC-MCNC: 4.7 G/DL (ref 1.9–3.5)
GLUCOSE SERPL-MCNC: 115 MG/DL (ref 65–99)
GLUCOSE UR STRIP.AUTO-MCNC: NEGATIVE MG/DL
HCT VFR BLD AUTO: 29.4 % (ref 37–47)
HGB BLD-MCNC: 9.3 G/DL (ref 12–16)
IMM GRANULOCYTES # BLD AUTO: 0.03 K/UL (ref 0–0.11)
IMM GRANULOCYTES NFR BLD AUTO: 0.7 % (ref 0–0.9)
INR PPP: 1.47 (ref 0.87–1.13)
KETONES UR STRIP.AUTO-MCNC: NEGATIVE MG/DL
LEUKOCYTE ESTERASE UR QL STRIP.AUTO: ABNORMAL
LIPASE SERPL-CCNC: 25 U/L (ref 11–82)
LYMPHOCYTES # BLD AUTO: 0.71 K/UL (ref 1–4.8)
LYMPHOCYTES NFR BLD: 16.1 % (ref 22–41)
MCH RBC QN AUTO: 28.4 PG (ref 27–33)
MCHC RBC AUTO-ENTMCNC: 31.6 G/DL (ref 32.2–35.5)
MCV RBC AUTO: 89.6 FL (ref 81.4–97.8)
MICRO URNS: ABNORMAL
MONOCYTES # BLD AUTO: 0.39 K/UL (ref 0–0.85)
MONOCYTES NFR BLD AUTO: 8.8 % (ref 0–13.4)
NEUTROPHILS # BLD AUTO: 3.04 K/UL (ref 1.82–7.42)
NEUTROPHILS NFR BLD: 68.8 % (ref 44–72)
NITRITE UR QL STRIP.AUTO: NEGATIVE
NRBC # BLD AUTO: 0 K/UL
NRBC BLD-RTO: 0 /100 WBC (ref 0–0.2)
NT-PROBNP SERPL IA-MCNC: 1452 PG/ML (ref 0–125)
PH UR STRIP.AUTO: 6.5 [PH] (ref 5–8)
PLATELET # BLD AUTO: 69 K/UL (ref 164–446)
PLATELETS.RETICULATED NFR BLD AUTO: 1 % (ref 0.6–13.1)
PMV BLD AUTO: 10.5 FL (ref 9–12.9)
POTASSIUM SERPL-SCNC: 4.7 MMOL/L (ref 3.6–5.5)
PROT SERPL-MCNC: 7.5 G/DL (ref 6–8.2)
PROT UR QL STRIP: 30 MG/DL
PROTHROMBIN TIME: 17.9 SEC (ref 12–14.6)
RBC # BLD AUTO: 3.28 M/UL (ref 4.2–5.4)
RBC # URNS HPF: >100 /HPF (ref 0–2)
RBC UR QL AUTO: ABNORMAL
RSV RNA SPEC QL NAA+PROBE: NEGATIVE
SARS-COV-2 RNA RESP QL NAA+PROBE: NOTDETECTED
SODIUM SERPL-SCNC: 137 MMOL/L (ref 135–145)
SP GR UR STRIP.AUTO: 1.01
UROBILINOGEN UR STRIP.AUTO-MCNC: 1 EU/DL
WBC # BLD AUTO: 4.4 K/UL (ref 4.8–10.8)
WBC #/AREA URNS HPF: ABNORMAL /HPF

## 2024-12-26 PROCEDURE — 85025 COMPLETE CBC W/AUTO DIFF WBC: CPT

## 2024-12-26 PROCEDURE — 99285 EMERGENCY DEPT VISIT HI MDM: CPT

## 2024-12-26 PROCEDURE — 82140 ASSAY OF AMMONIA: CPT

## 2024-12-26 PROCEDURE — 85730 THROMBOPLASTIN TIME PARTIAL: CPT

## 2024-12-26 PROCEDURE — 80053 COMPREHEN METABOLIC PANEL: CPT

## 2024-12-26 PROCEDURE — 700111 HCHG RX REV CODE 636 W/ 250 OVERRIDE (IP): Mod: JZ,UD | Performed by: EMERGENCY MEDICINE

## 2024-12-26 PROCEDURE — 36415 COLL VENOUS BLD VENIPUNCTURE: CPT

## 2024-12-26 PROCEDURE — 96374 THER/PROPH/DIAG INJ IV PUSH: CPT

## 2024-12-26 PROCEDURE — 85055 RETICULATED PLATELET ASSAY: CPT

## 2024-12-26 PROCEDURE — 71045 X-RAY EXAM CHEST 1 VIEW: CPT

## 2024-12-26 PROCEDURE — 85610 PROTHROMBIN TIME: CPT

## 2024-12-26 PROCEDURE — 83690 ASSAY OF LIPASE: CPT

## 2024-12-26 PROCEDURE — 83880 ASSAY OF NATRIURETIC PEPTIDE: CPT

## 2024-12-26 PROCEDURE — 81001 URINALYSIS AUTO W/SCOPE: CPT

## 2024-12-26 PROCEDURE — 0241U HCHG SARS-COV-2 COVID-19 NFCT DS RESP RNA 4 TRGT ED POC: CPT

## 2024-12-26 PROCEDURE — 700105 HCHG RX REV CODE 258: Mod: UD | Performed by: EMERGENCY MEDICINE

## 2024-12-26 RX ORDER — SODIUM CHLORIDE 9 MG/ML
500 INJECTION, SOLUTION INTRAVENOUS ONCE
Status: COMPLETED | OUTPATIENT
Start: 2024-12-26 | End: 2024-12-26

## 2024-12-26 RX ORDER — ONDANSETRON 2 MG/ML
4 INJECTION INTRAMUSCULAR; INTRAVENOUS ONCE
Status: COMPLETED | OUTPATIENT
Start: 2024-12-26 | End: 2024-12-26

## 2024-12-26 RX ADMIN — ONDANSETRON 4 MG: 2 INJECTION INTRAMUSCULAR; INTRAVENOUS at 09:07

## 2024-12-26 RX ADMIN — SODIUM CHLORIDE 500 ML: 9 INJECTION, SOLUTION INTRAVENOUS at 12:00

## 2024-12-26 ASSESSMENT — FIBROSIS 4 INDEX: FIB4 SCORE: 5.06

## 2024-12-26 NOTE — ED NOTES
Pt resting on stretcher in NAD at this time. Connected to bedside monitor, call light in reach. Awaiting results.

## 2024-12-26 NOTE — DISCHARGE PLANNING
MSW received update from bedside RN that pt needs to return to [facility]. Report was called by bedside RN. MSW called Jacki at [facility] and arranged [facility] transport for 1515. MSW updated pt. COBRA and packet on chart. Bedside RN updated.

## 2024-12-26 NOTE — ED PROVIDER NOTES
ED Provider Note    CHIEF COMPLAINT  Chief Complaint   Patient presents with    Nausea/Vomiting/Diarrhea       EXTERNAL RECORDS REVIEWED  Reviewed medication list previous ER encounters recent admission and discharge summary from November HPI/ROS  LIMITATION TO HISTORY   Patient is sluggish poor historian  OUTSIDE HISTORIAN(S):  EMS provided additional history on the run report    April Alicia is a 64 y.o. female who presents for a constellation of symptoms including ongoing nausea vomiting and diarrhea subjective low-grade fever.  The patient is a complex medical history as listed below.  She was sent over from [facility].  She has a known history of cirrhosis.  She denies hematemesis hematochezia or melena.  EMS was activated yesterday but the patient ultimately declined transport.  No report of any reductive cough or strokelike symptoms such as focal numbness weakness tingling to the arms legs or face.  She was recently hospitalized at this facility and underwent upper endoscopy demonstrating portal hypertensive gastropathy that was causing slow GI bleed and melena.  There are no varices noted    PAST MEDICAL HISTORY   has a past medical history of Abnormal finding of lung (12/27/2022), Acute exacerbation of chronic obstructive pulmonary disease (COPD) ([facility]) (01/27/2020), Alcoholic cirrhosis (HCC), Arthritis, ASTHMA, Atrial fibrillation (HCC), Dry eyes (11/16/2017), Edentulous, Emphysema of lung (HCC), H/O: hysterectomy (12/05/2019), Heart burn, Hepatic encephalopathy (HCC) (12/27/2022), Hepatitis C infection (07/28/2022), History of rheumatic fever (09/04/2017), Hypertension, 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HCC), Medication overuse headache (08/15/2017), Mitral regurgitation, Pancytopenia (HCC) (07/26/2022), Pneumonia (02/2020), Primary osteoarthritis of left knee (08/25/2020), Prosthetic joint infection ([facility]) (2018), Protein-calorie malnutrition, severe ([facility]) (08/01/2022), Right leg  DVT ([facility]) (2018), Seizure disorder ([facility]), Septic arthritis of knee, left ([facility]) (2022), Septic arthritis of shoulder, left (HCC) (2022), Septic shock due to Pseudomonas species ([facility]) (10/30/2023), and Type 2 diabetes mellitus ([facility]) (2022).    SURGICAL HISTORY   has a past surgical history that includes gyn surgery (); gyn surgery (); colonoscopy with clipping (10/28/2015); colonoscopy with sclerotherapy (10/28/2015); colonoscopy with tattooing (10/28/2015); irrigation & debridement ortho (Right, 9/3/2017); knee arthroplasty total (Right, ); total knee arthroplasty (Left, 2020); irrigation & debridement general (Left, 2022); revise knee joint replace,all parts (Left, 2022); revise knee joint replace,all parts (Left, 2022); irrigation & debridement general (Left, 2022); total knee arthroplasty (Left, 10/30/2023); knee amputation above (Left, 2024); and upper gi endoscopy,diagnosis (N/A, 2024).    FAMILY HISTORY  Family History   Problem Relation Age of Onset    Diabetes Mother     Seizures Father     Heart Attack Father 45    Diabetes Brother     Alcohol abuse Brother     Dementia Brother     Diabetes Brother        SOCIAL HISTORY  Social History     Tobacco Use    Smoking status: Former     Current packs/day: 0.00     Types: Cigarettes     Quit date: 2016     Years since quittin.9    Smokeless tobacco: Former     Quit date: 2021    Tobacco comments:     a few a day when I can   Vaping Use    Vaping status: Never Used   Substance and Sexual Activity    Alcohol use: Not Currently     Comment: hx of AUD    Drug use: Not Currently    Sexual activity: Not Currently     Partners: Male     Birth control/protection: Post-Menopausal     Comment: Has boyfriend, not currently sexually active       CURRENT MEDICATIONS  Home Medications       Reviewed by Florecita Lipscomb R.N. (Registered Nurse) on 24 at 0817  Med List Status: Not Addressed  "    Medication Last Dose Status   acetaminophen (TYLENOL) 325 MG Tab  Active   albuterol 108 (90 Base) MCG/ACT Aero Soln inhalation aerosol  Active   ascorbic acid (ASCORBIC ACID) 500 MG Tab  Active   atorvastatin (LIPITOR) 20 MG Tab  Active   bisacodyl (DULCOLAX) 10 MG Suppos  Active   carvedilol (COREG) 3.125 MG Tab  Active   Dextrose, Diabetic Use, (GLUCOSE) 77.4 % Gel  Active   ferrous sulfate 325 (65 Fe) MG tablet  Active   furosemide (LASIX) 40 MG Tab  Active   gabapentin (NEURONTIN) 400 MG Cap  Active   glucagon 1 mg Recon Soln  Active   insulin regular (HUMULIN R/NOVOLIN R) 100 Unit/mL Solution  Active   lactulose 20 GM/30ML Solution  Active   magnesium hydroxide (MILK OF MAGNESIA) 400 MG/5ML Suspension  Active   magnesium oxide (MAG-OX) 400 MG Tab tablet  Active   pantoprazole (PROTONIX) 20 MG tablet  Active   spironolactone (ALDACTONE) 50 MG Tab  Active                  Audit from Redirected Encounters    **Home medications have not yet been reviewed for this encounter**         ALLERGIES  Allergies   Allergen Reactions    Meropenem Swelling     Pt had rxt to meropenem June 2024 (marked tongue swelling and right facial/periorbital edema)    Tuberculin Tests Unspecified     Per MAR from [facility]       PHYSICAL EXAM  VITAL SIGNS: BP 93/62   Pulse 75   Temp 36.7 °C (98 °F) (Oral)   Resp 17   Ht 1.727 m (5' 8\")   Wt 93.4 kg (205 lb 14.6 oz)   LMP 06/02/2008   SpO2 95%   BMI 31.31 kg/m²    Pulse ox interpretation: I interpret this pulse ox as normal.  Constitutional: Alert and oriented x 3, no acute distress patient appears chronically ill  HEENT: Atraumatic normocephalic, pupils are equal round reactive to light extraocular movements are intact. The nares is clear, external ears are normal, mouth shows moist mucous membranes normal dentition for age  Neck: Supple, no JVD no tracheal deviation  Cardiovascular: Regular rate and rhythm no murmur rub or gallop 2+ pulses peripherally x4  Thorax & Lungs: " No respiratory distress, no wheezes rales or rhonchi, No chest tenderness.   GI: Soft nontender nondistended positive bowel sounds, no peritoneal signs no rebound or guarding  Skin: Warm dry no acute rash or lesion  Musculoskeletal: Moving all extremities with full range and 5 of 5 strength no acute  deformity  Neurologic: Cranial nerves III through XII are grossly intact no sensory deficit no cerebellar dysfunction   Psychiatric: Appropriate affect for situation at this time          EKG/LABS  Results for orders placed or performed during the hospital encounter of 12/26/24   POC CoV-2, FLU A/B, RSV by PCR    Collection Time: 12/26/24  9:05 AM   Result Value Ref Range    POC Influenza A RNA, PCR Negative Negative    POC Influenza B RNA, PCR Negative Negative    POC RSV, by PCR Negative Negative    POC SARS-CoV-2, PCR NotDetected NotDetected   CBC WITH DIFFERENTIAL    Collection Time: 12/26/24  9:07 AM   Result Value Ref Range    WBC 4.4 (L) 4.8 - 10.8 K/uL    RBC 3.28 (L) 4.20 - 5.40 M/uL    Hemoglobin 9.3 (L) 12.0 - 16.0 g/dL    Hematocrit 29.4 (L) 37.0 - 47.0 %    MCV 89.6 81.4 - 97.8 fL    MCH 28.4 27.0 - 33.0 pg    MCHC 31.6 (L) 32.2 - 35.5 g/dL    RDW 55.3 (H) 35.9 - 50.0 fL    Platelet Count 69 (L) 164 - 446 K/uL    MPV 10.5 9.0 - 12.9 fL    Neutrophils-Polys 68.80 44.00 - 72.00 %    Lymphocytes 16.10 (L) 22.00 - 41.00 %    Monocytes 8.80 0.00 - 13.40 %    Eosinophils 4.50 0.00 - 6.90 %    Basophils 1.10 0.00 - 1.80 %    Immature Granulocytes 0.70 0.00 - 0.90 %    Nucleated RBC 0.00 0.00 - 0.20 /100 WBC    Neutrophils (Absolute) 3.04 1.82 - 7.42 K/uL    Lymphs (Absolute) 0.71 (L) 1.00 - 4.80 K/uL    Monos (Absolute) 0.39 0.00 - 0.85 K/uL    Eos (Absolute) 0.20 0.00 - 0.51 K/uL    Baso (Absolute) 0.05 0.00 - 0.12 K/uL    Immature Granulocytes (abs) 0.03 0.00 - 0.11 K/uL    NRBC (Absolute) 0.00 K/uL   Prothrombin Time    Collection Time: 12/26/24  9:07 AM   Result Value Ref Range    PT 17.9 (H) 12.0 - 14.6 sec     INR 1.47 (H) 0.87 - 1.13   APTT    Collection Time: 12/26/24  9:07 AM   Result Value Ref Range    APTT 42.2 (H) 24.7 - 36.0 sec   Comp Metabolic Panel    Collection Time: 12/26/24  9:07 AM   Result Value Ref Range    Sodium 137 135 - 145 mmol/L    Potassium 4.7 3.6 - 5.5 mmol/L    Chloride 111 96 - 112 mmol/L    Co2 18 (L) 20 - 33 mmol/L    Anion Gap 8.0 7.0 - 16.0    Glucose 115 (H) 65 - 99 mg/dL    Bun 42 (H) 8 - 22 mg/dL    Creatinine 1.23 0.50 - 1.40 mg/dL    Calcium 8.4 (L) 8.5 - 10.5 mg/dL    Correct Calcium 9.4 8.5 - 10.5 mg/dL    AST(SGOT) 29 12 - 45 U/L    ALT(SGPT) 11 2 - 50 U/L    Alkaline Phosphatase 203 (H) 30 - 99 U/L    Total Bilirubin 0.9 0.1 - 1.5 mg/dL    Albumin 2.8 (L) 3.2 - 4.9 g/dL    Total Protein 7.5 6.0 - 8.2 g/dL    Globulin 4.7 (H) 1.9 - 3.5 g/dL    A-G Ratio 0.6 g/dL   LIPASE    Collection Time: 12/26/24  9:07 AM   Result Value Ref Range    Lipase 25 11 - 82 U/L   proBrain Natriuretic Peptide, NT    Collection Time: 12/26/24  9:07 AM   Result Value Ref Range    NT-proBNP 1452 (H) 0 - 125 pg/mL   AMMONIA    Collection Time: 12/26/24  9:07 AM   Result Value Ref Range    Ammonia 76 (H) 11 - 45 umol/L   IMMATURE PLT FRACTION    Collection Time: 12/26/24  9:07 AM   Result Value Ref Range    Imm. Plt Fraction 1.0 0.6 - 13.1 %   ESTIMATED GFR    Collection Time: 12/26/24  9:07 AM   Result Value Ref Range    GFR (CKD-EPI) 49 (A) >60 mL/min/1.73 m 2   URINALYSIS    Collection Time: 12/26/24  1:09 PM    Specimen: Urine   Result Value Ref Range    Color Yellow     Character Clear     Specific Gravity 1.013 <1.035    Ph 6.5 5.0 - 8.0    Glucose Negative Negative mg/dL    Ketones Negative Negative mg/dL    Protein 30 (A) Negative mg/dL    Bilirubin Negative Negative    Urobilinogen, Urine 1.0 <=1.0 EU/dL    Nitrite Negative Negative    Leukocyte Esterase Trace (A) Negative    Occult Blood Large (A) Negative    Micro Urine Req Microscopic    URINE MICROSCOPIC (W/UA)    Collection Time: 12/26/24   1:09 PM   Result Value Ref Range    WBC 3-5 /hpf    RBC >100 (A) 0 - 2 /hpf    Bacteria None Seen None /hpf    Epithelial Cells 0-2 0 - 5 /hpf    Urine Casts 0-2 0 - 2 /lpf     *Note: Due to a large number of results and/or encounters for the requested time period, some results have not been displayed. A complete set of results can be found in Results Review.       I have independently interpreted this EKG    RADIOLOGY/PROCEDURES     Emergency physician performed bedside ultrasound of the left lower quadrant demonstrating no large pocket of ascitic fluid for drainage:      I have independently interpreted the diagnostic imaging associated with this visit and am waiting the final reading from the radiologist.   My preliminary interpretation is as follows: Improving infiltrates and edema    Radiologist interpretation:  DX-CHEST-PORTABLE (1 VIEW)   Final Result      1.  Improving interstitial infiltrates and/or edema.   2.  Cardiomegaly.   3.  Persistent right lower lung opacity.          COURSE & MEDICAL DECISION MAKING    ASSESSMENT, COURSE AND PLAN  Care Narrative:    This is a chronically ill 64-year-old who presents from [facility] for nausea vomiting and diarrhea.  Here her vital signs were reassuring other than slightly low blood pressures which is consistent with her history of underlying liver disease.  She had no high fever or tachycardia or worsening hypoxia.  Nursing staff had significant difficulty obtaining IV access therefore I placed a ultrasound-guided peripheral IV.  She was given parenteral nausea meds.  With her underlying liver disease I considered but did not feel that aggressive crystalloid bolus fluid repletion is indicated that she would likely third space much of her fluids.  There is also national shortage of IV fluid and she is aggressively taking oral liquids therefore I think that is reasonable at this time.  Her chest x-ray demonstrates resolving and improving airspace disease  and I performed a quick bedside ultrasound that she did not have a large pocket of ascitic fluid for sampling.  Here her CBC does not demonstrate any leukocytosis or bandemia and hemoglobin is stable.  Metabolic panel demonstrates underlying mild liver disease but no hyperbilirubinemia and kidney function is preserved.  Catheterized urine sample is negative for any obvious infection and viral studies are negative.  I considered but did not feel that abdominal imaging imaging is indicated.  I performed serial abdominal exams and she has no peritoneal signs.  She is able to take clear liquids and I suspect she has some mild viral gastroenteritis.  She is able to take clear liquids and feeling improved.  Will provide transport back to her care facility with return precautions        ADDITIONAL PROBLEMS MANAGED      DISPOSITION AND DISCUSSIONS  I have discussed management of the patient with the following physicians and CANDIDO's: None    Discussion of management with other QHP or appropriate source(s): None    Escalation of care considered, and ultimately not performed: Considered abdominal imaging and/or IV fluids    Barriers to care at this time, including but not limited to: None.     Decision tools and prescription drugs considered including, but not limited to: None.    FINAL DIAGNOSIS  1. Nausea vomiting and diarrhea         Electronically signed by: Tyrell Jones M.D., 12/26/2024 8:17 AM

## 2024-12-26 NOTE — ED NOTES
Some of Pt IVF (NS) went subQ. Noticed when removing IV. Pt and [facility] given DC instructions and verbalized understanding. Pt left with glasses, cell phone, hearing aids, shoe, and clothing.

## 2025-01-01 ENCOUNTER — APPOINTMENT (OUTPATIENT)
Dept: RADIOLOGY | Facility: MEDICAL CENTER | Age: 65
DRG: 871 | End: 2025-01-01
Attending: INTERNAL MEDICINE
Payer: MEDICARE

## 2025-01-01 ENCOUNTER — APPOINTMENT (OUTPATIENT)
Dept: RADIOLOGY | Facility: MEDICAL CENTER | Age: 65
DRG: 871 | End: 2025-01-01
Attending: HOSPITALIST
Payer: MEDICARE

## 2025-01-01 ENCOUNTER — APPOINTMENT (OUTPATIENT)
Dept: CARDIOLOGY | Facility: MEDICAL CENTER | Age: 65
DRG: 871 | End: 2025-01-01
Attending: INTERNAL MEDICINE
Payer: MEDICARE

## 2025-01-01 ENCOUNTER — HOSPITAL ENCOUNTER (INPATIENT)
Facility: MEDICAL CENTER | Age: 65
LOS: 14 days | DRG: 871 | End: 2025-08-22
Attending: EMERGENCY MEDICINE | Admitting: INTERNAL MEDICINE
Payer: MEDICARE

## 2025-01-01 ENCOUNTER — APPOINTMENT (OUTPATIENT)
Dept: RADIOLOGY | Facility: MEDICAL CENTER | Age: 65
DRG: 871 | End: 2025-01-01
Attending: EMERGENCY MEDICINE
Payer: MEDICARE

## 2025-01-01 ENCOUNTER — HOSPITAL ENCOUNTER (OUTPATIENT)
Dept: RADIOLOGY | Facility: MEDICAL CENTER | Age: 65
End: 2025-08-09

## 2025-01-01 ENCOUNTER — HOSPITAL ENCOUNTER (OUTPATIENT)
Dept: RADIOLOGY | Facility: MEDICAL CENTER | Age: 65
End: 2025-08-10
Payer: MEDICARE

## 2025-01-01 VITALS
OXYGEN SATURATION: 97 % | HEART RATE: 74 BPM | TEMPERATURE: 96.6 F | RESPIRATION RATE: 12 BRPM | SYSTOLIC BLOOD PRESSURE: 75 MMHG | BODY MASS INDEX: 34.53 KG/M2 | DIASTOLIC BLOOD PRESSURE: 43 MMHG | WEIGHT: 220.02 LBS | HEIGHT: 67 IN

## 2025-01-01 DIAGNOSIS — R65.21 SEPTIC SHOCK (HCC): ICD-10-CM

## 2025-01-01 DIAGNOSIS — R41.82 ALTERED MENTAL STATUS, UNSPECIFIED ALTERED MENTAL STATUS TYPE: Primary | ICD-10-CM

## 2025-01-01 DIAGNOSIS — N18.9 ACUTE RENAL FAILURE SUPERIMPOSED ON CHRONIC KIDNEY DISEASE, UNSPECIFIED ACUTE RENAL FAILURE TYPE, UNSPECIFIED CKD STAGE (HCC): ICD-10-CM

## 2025-01-01 DIAGNOSIS — R57.9 SHOCK (HCC): ICD-10-CM

## 2025-01-01 DIAGNOSIS — A41.9 SEPTIC SHOCK (HCC): ICD-10-CM

## 2025-01-01 DIAGNOSIS — R78.81 BACTEREMIA DUE TO GRAM-POSITIVE BACTERIA: ICD-10-CM

## 2025-01-01 DIAGNOSIS — N17.9 ACUTE RENAL FAILURE SUPERIMPOSED ON CHRONIC KIDNEY DISEASE, UNSPECIFIED ACUTE RENAL FAILURE TYPE, UNSPECIFIED CKD STAGE (HCC): ICD-10-CM

## 2025-01-01 DIAGNOSIS — G93.40 ACUTE ENCEPHALOPATHY: ICD-10-CM

## 2025-01-01 DIAGNOSIS — K70.31 ALCOHOLIC CIRRHOSIS OF LIVER WITH ASCITES (HCC): ICD-10-CM

## 2025-01-01 DIAGNOSIS — J96.01 ACUTE HYPOXIC RESPIRATORY FAILURE (HCC): ICD-10-CM

## 2025-01-01 LAB
ABO GROUP BLD: NORMAL
ACANTHOCYTES BLD QL SMEAR: NORMAL
ACTION RANGE TRIGGERED IACRT: NO
ACTION RANGE TRIGGERED IACRT: NO
ACTION RANGE TRIGGERED IACRT: YES
ALBUMIN SERPL BCP-MCNC: 1.7 G/DL (ref 3.2–4.9)
ALBUMIN SERPL BCP-MCNC: 1.8 G/DL (ref 3.2–4.9)
ALBUMIN SERPL BCP-MCNC: 1.9 G/DL (ref 3.2–4.9)
ALBUMIN SERPL BCP-MCNC: 1.9 G/DL (ref 3.2–4.9)
ALBUMIN SERPL BCP-MCNC: 2 G/DL (ref 3.2–4.9)
ALBUMIN SERPL BCP-MCNC: 2 G/DL (ref 3.2–4.9)
ALBUMIN SERPL BCP-MCNC: 2.1 G/DL (ref 3.2–4.9)
ALBUMIN SERPL BCP-MCNC: 2.3 G/DL (ref 3.2–4.9)
ALBUMIN/GLOB SERPL: 0.4 G/DL
ALBUMIN/GLOB SERPL: 0.5 G/DL
ALP SERPL-CCNC: 103 U/L (ref 30–99)
ALP SERPL-CCNC: 110 U/L (ref 30–99)
ALP SERPL-CCNC: 113 U/L (ref 30–99)
ALP SERPL-CCNC: 114 U/L (ref 30–99)
ALP SERPL-CCNC: 115 U/L (ref 30–99)
ALP SERPL-CCNC: 116 U/L (ref 30–99)
ALP SERPL-CCNC: 118 U/L (ref 30–99)
ALP SERPL-CCNC: 123 U/L (ref 30–99)
ALP SERPL-CCNC: 130 U/L (ref 30–99)
ALP SERPL-CCNC: 131 U/L (ref 30–99)
ALP SERPL-CCNC: 82 U/L (ref 30–99)
ALP SERPL-CCNC: 92 U/L (ref 30–99)
ALT SERPL-CCNC: 23 U/L (ref 2–50)
ALT SERPL-CCNC: 25 U/L (ref 2–50)
ALT SERPL-CCNC: 32 U/L (ref 2–50)
ALT SERPL-CCNC: 33 U/L (ref 2–50)
ALT SERPL-CCNC: 40 U/L (ref 2–50)
ALT SERPL-CCNC: 43 U/L (ref 2–50)
ALT SERPL-CCNC: 57 U/L (ref 2–50)
ALT SERPL-CCNC: 58 U/L (ref 2–50)
ALT SERPL-CCNC: 59 U/L (ref 2–50)
ALT SERPL-CCNC: 65 U/L (ref 2–50)
ALT SERPL-CCNC: 77 U/L (ref 2–50)
ALT SERPL-CCNC: 84 U/L (ref 2–50)
AMMONIA PLAS-SCNC: 25 UMOL/L (ref 11–45)
AMMONIA PLAS-SCNC: 32 UMOL/L (ref 11–45)
AMMONIA PLAS-SCNC: 59 UMOL/L (ref 11–45)
ANION GAP SERPL CALC-SCNC: 10 MMOL/L (ref 7–16)
ANION GAP SERPL CALC-SCNC: 11 MMOL/L (ref 7–16)
ANION GAP SERPL CALC-SCNC: 12 MMOL/L (ref 7–16)
ANION GAP SERPL CALC-SCNC: 13 MMOL/L (ref 7–16)
ANION GAP SERPL CALC-SCNC: 13 MMOL/L (ref 7–16)
ANION GAP SERPL CALC-SCNC: 14 MMOL/L (ref 7–16)
ANION GAP SERPL CALC-SCNC: 15 MMOL/L (ref 7–16)
ANION GAP SERPL CALC-SCNC: 16 MMOL/L (ref 7–16)
ANION GAP SERPL CALC-SCNC: 17 MMOL/L (ref 7–16)
ANION GAP SERPL CALC-SCNC: 17 MMOL/L (ref 7–16)
ANION GAP SERPL CALC-SCNC: 18 MMOL/L (ref 7–16)
ANION GAP SERPL CALC-SCNC: 19 MMOL/L (ref 7–16)
ANION GAP SERPL CALC-SCNC: 19 MMOL/L (ref 7–16)
ANION GAP SERPL CALC-SCNC: 21 MMOL/L (ref 7–16)
ANION GAP SERPL CALC-SCNC: 9 MMOL/L (ref 7–16)
ANISOCYTOSIS BLD QL SMEAR: ABNORMAL
APPEARANCE UR: ABNORMAL
APPEARANCE UR: ABNORMAL
APTT PPP: 42 SEC (ref 24.7–36)
ARTERIAL PATENCY WRIST A: ABNORMAL
AST SERPL-CCNC: 100 U/L (ref 12–45)
AST SERPL-CCNC: 104 U/L (ref 12–45)
AST SERPL-CCNC: 104 U/L (ref 12–45)
AST SERPL-CCNC: 130 U/L (ref 12–45)
AST SERPL-CCNC: 18 U/L (ref 12–45)
AST SERPL-CCNC: 19 U/L (ref 12–45)
AST SERPL-CCNC: 19 U/L (ref 12–45)
AST SERPL-CCNC: 20 U/L (ref 12–45)
AST SERPL-CCNC: 20 U/L (ref 12–45)
AST SERPL-CCNC: 29 U/L (ref 12–45)
AST SERPL-CCNC: 43 U/L (ref 12–45)
AST SERPL-CCNC: 98 U/L (ref 12–45)
BACTERIA #/AREA URNS HPF: ABNORMAL /HPF
BACTERIA #/AREA URNS HPF: ABNORMAL /HPF
BACTERIA BLD CULT: ABNORMAL
BACTERIA BLD CULT: NORMAL
BACTERIA BLD CULT: NORMAL
BACTERIA SPEC RESP CULT: ABNORMAL
BACTERIA UR CULT: ABNORMAL
BACTERIA UR CULT: ABNORMAL
BACTERIA UR CULT: NORMAL
BARCODED ABORH UBTYP: 5100
BARCODED ABORH UBTYP: 5100
BARCODED PRD CODE UBPRD: NORMAL
BARCODED PRD CODE UBPRD: NORMAL
BARCODED UNIT NUM UBUNT: NORMAL
BARCODED UNIT NUM UBUNT: NORMAL
BASE EXCESS BLDA CALC-SCNC: -12 MMOL/L (ref -4–3)
BASE EXCESS BLDA CALC-SCNC: -15 MMOL/L (ref -4–3)
BASE EXCESS BLDA CALC-SCNC: -17 MMOL/L (ref -4–3)
BASE EXCESS BLDA CALC-SCNC: -4 MMOL/L (ref -4–3)
BASE EXCESS BLDA CALC-SCNC: -9 MMOL/L (ref -4–3)
BASE EXCESS BLDA CALC-SCNC: 1 MMOL/L (ref -4–3)
BASOPHILS # BLD AUTO: 0 % (ref 0–1.8)
BASOPHILS # BLD AUTO: 0.2 % (ref 0–1.8)
BASOPHILS # BLD: 0 K/UL (ref 0–0.12)
BASOPHILS # BLD: 0.02 K/UL (ref 0–0.12)
BILIRUB SERPL-MCNC: 1.4 MG/DL (ref 0.1–1.5)
BILIRUB SERPL-MCNC: 1.5 MG/DL (ref 0.1–1.5)
BILIRUB SERPL-MCNC: 1.6 MG/DL (ref 0.1–1.5)
BILIRUB SERPL-MCNC: 1.6 MG/DL (ref 0.1–1.5)
BILIRUB SERPL-MCNC: 1.7 MG/DL (ref 0.1–1.5)
BILIRUB SERPL-MCNC: 1.8 MG/DL (ref 0.1–1.5)
BILIRUB SERPL-MCNC: 1.8 MG/DL (ref 0.1–1.5)
BILIRUB SERPL-MCNC: 1.9 MG/DL (ref 0.1–1.5)
BILIRUB SERPL-MCNC: 1.9 MG/DL (ref 0.1–1.5)
BILIRUB SERPL-MCNC: 2.2 MG/DL (ref 0.1–1.5)
BILIRUB SERPL-MCNC: 2.3 MG/DL (ref 0.1–1.5)
BILIRUB SERPL-MCNC: 2.3 MG/DL (ref 0.1–1.5)
BILIRUB UR QL STRIP.AUTO: ABNORMAL
BILIRUB UR QL STRIP.AUTO: NEGATIVE
BLD GP AB SCN SERPL QL: NORMAL
BODY TEMPERATURE: 36.6 CENTIGRADE
BREATHS SETTING VENT: 26
BREATHS SETTING VENT: 30
BUN SERPL-MCNC: 44 MG/DL (ref 8–22)
BUN SERPL-MCNC: 46 MG/DL (ref 8–22)
BUN SERPL-MCNC: 47 MG/DL (ref 8–22)
BUN SERPL-MCNC: 49 MG/DL (ref 8–22)
BUN SERPL-MCNC: 50 MG/DL (ref 8–22)
BUN SERPL-MCNC: 51 MG/DL (ref 8–22)
BUN SERPL-MCNC: 52 MG/DL (ref 8–22)
BUN SERPL-MCNC: 52 MG/DL (ref 8–22)
BUN SERPL-MCNC: 53 MG/DL (ref 8–22)
BUN SERPL-MCNC: 54 MG/DL (ref 8–22)
BUN SERPL-MCNC: 54 MG/DL (ref 8–22)
BUN SERPL-MCNC: 55 MG/DL (ref 8–22)
BUN SERPL-MCNC: 55 MG/DL (ref 8–22)
BUN SERPL-MCNC: 56 MG/DL (ref 8–22)
BUN SERPL-MCNC: 57 MG/DL (ref 8–22)
BUN SERPL-MCNC: 58 MG/DL (ref 8–22)
BUN SERPL-MCNC: 66 MG/DL (ref 8–22)
BUN SERPL-MCNC: 74 MG/DL (ref 8–22)
BUN SERPL-MCNC: 85 MG/DL (ref 8–22)
BUN SERPL-MCNC: 87 MG/DL (ref 8–22)
BUN SERPL-MCNC: 89 MG/DL (ref 8–22)
BURR CELLS BLD QL SMEAR: NORMAL
CA-I SERPL-SCNC: 0.9 MMOL/L (ref 1.1–1.3)
CALCIUM ALBUM COR SERPL-MCNC: 8 MG/DL (ref 8.5–10.5)
CALCIUM ALBUM COR SERPL-MCNC: 8.4 MG/DL (ref 8.5–10.5)
CALCIUM ALBUM COR SERPL-MCNC: 8.5 MG/DL (ref 8.5–10.5)
CALCIUM ALBUM COR SERPL-MCNC: 8.9 MG/DL (ref 8.5–10.5)
CALCIUM ALBUM COR SERPL-MCNC: 9.1 MG/DL (ref 8.5–10.5)
CALCIUM ALBUM COR SERPL-MCNC: 9.2 MG/DL (ref 8.5–10.5)
CALCIUM ALBUM COR SERPL-MCNC: 9.3 MG/DL (ref 8.5–10.5)
CALCIUM ALBUM COR SERPL-MCNC: 9.7 MG/DL (ref 8.5–10.5)
CALCIUM ALBUM COR SERPL-MCNC: 9.8 MG/DL (ref 8.5–10.5)
CALCIUM ALBUM COR SERPL-MCNC: 9.8 MG/DL (ref 8.5–10.5)
CALCIUM SERPL-MCNC: 6.1 MG/DL (ref 8.5–10.5)
CALCIUM SERPL-MCNC: 6.2 MG/DL (ref 8.5–10.5)
CALCIUM SERPL-MCNC: 6.2 MG/DL (ref 8.5–10.5)
CALCIUM SERPL-MCNC: 6.4 MG/DL (ref 8.5–10.5)
CALCIUM SERPL-MCNC: 6.5 MG/DL (ref 8.5–10.5)
CALCIUM SERPL-MCNC: 6.6 MG/DL (ref 8.5–10.5)
CALCIUM SERPL-MCNC: 6.7 MG/DL (ref 8.5–10.5)
CALCIUM SERPL-MCNC: 6.8 MG/DL (ref 8.5–10.5)
CALCIUM SERPL-MCNC: 7 MG/DL (ref 8.5–10.5)
CALCIUM SERPL-MCNC: 7.1 MG/DL (ref 8.5–10.5)
CALCIUM SERPL-MCNC: 7.2 MG/DL (ref 8.5–10.5)
CALCIUM SERPL-MCNC: 7.3 MG/DL (ref 8.5–10.5)
CALCIUM SERPL-MCNC: 7.5 MG/DL (ref 8.5–10.5)
CALCIUM SERPL-MCNC: 7.5 MG/DL (ref 8.5–10.5)
CALCIUM SERPL-MCNC: 7.6 MG/DL (ref 8.5–10.5)
CALCIUM SERPL-MCNC: 7.7 MG/DL (ref 8.5–10.5)
CALCIUM SERPL-MCNC: 7.9 MG/DL (ref 8.5–10.5)
CALCIUM SERPL-MCNC: 7.9 MG/DL (ref 8.5–10.5)
CALCIUM SERPL-MCNC: 8 MG/DL (ref 8.5–10.5)
CALCIUM SERPL-MCNC: 8 MG/DL (ref 8.5–10.5)
CALCIUM SERPL-MCNC: 8.1 MG/DL (ref 8.5–10.5)
CALCIUM SERPL-MCNC: 8.2 MG/DL (ref 8.5–10.5)
CALCIUM SERPL-MCNC: 8.2 MG/DL (ref 8.5–10.5)
CASTS URNS QL MICRO: ABNORMAL /LPF (ref 0–2)
CASTS URNS QL MICRO: ABNORMAL /LPF (ref 0–2)
CFT BLD TEG: 8.9 MIN (ref 4.6–9.1)
CFT P HPASE BLD TEG: 8.7 MIN (ref 4.3–8.3)
CHLORIDE SERPL-SCNC: 100 MMOL/L (ref 96–112)
CHLORIDE SERPL-SCNC: 100 MMOL/L (ref 96–112)
CHLORIDE SERPL-SCNC: 101 MMOL/L (ref 96–112)
CHLORIDE SERPL-SCNC: 102 MMOL/L (ref 96–112)
CHLORIDE SERPL-SCNC: 103 MMOL/L (ref 96–112)
CHLORIDE SERPL-SCNC: 103 MMOL/L (ref 96–112)
CHLORIDE SERPL-SCNC: 104 MMOL/L (ref 96–112)
CHLORIDE SERPL-SCNC: 105 MMOL/L (ref 96–112)
CHLORIDE SERPL-SCNC: 106 MMOL/L (ref 96–112)
CHLORIDE SERPL-SCNC: 106 MMOL/L (ref 96–112)
CHLORIDE SERPL-SCNC: 107 MMOL/L (ref 96–112)
CHLORIDE SERPL-SCNC: 107 MMOL/L (ref 96–112)
CHLORIDE SERPL-SCNC: 108 MMOL/L (ref 96–112)
CHLORIDE SERPL-SCNC: 97 MMOL/L (ref 96–112)
CHLORIDE SERPL-SCNC: 99 MMOL/L (ref 96–112)
CK SERPL-CCNC: 105 U/L (ref 0–154)
CK SERPL-CCNC: 1196 U/L (ref 0–154)
CK SERPL-CCNC: 34 U/L (ref 0–154)
CK SERPL-CCNC: 471 U/L (ref 0–154)
CLOT ANGLE BLD TEG: 65.2 DEGREES (ref 63–78)
CO2 BLDA-SCNC: 10 MMOL/L (ref 20–33)
CO2 BLDA-SCNC: 13 MMOL/L (ref 20–33)
CO2 BLDA-SCNC: 13 MMOL/L (ref 20–33)
CO2 BLDA-SCNC: 15 MMOL/L (ref 20–33)
CO2 BLDA-SCNC: 18 MMOL/L (ref 20–33)
CO2 SERPL-SCNC: 11 MMOL/L (ref 20–33)
CO2 SERPL-SCNC: 11 MMOL/L (ref 20–33)
CO2 SERPL-SCNC: 12 MMOL/L (ref 20–33)
CO2 SERPL-SCNC: 13 MMOL/L (ref 20–33)
CO2 SERPL-SCNC: 14 MMOL/L (ref 20–33)
CO2 SERPL-SCNC: 15 MMOL/L (ref 20–33)
CO2 SERPL-SCNC: 16 MMOL/L (ref 20–33)
CO2 SERPL-SCNC: 16 MMOL/L (ref 20–33)
CO2 SERPL-SCNC: 17 MMOL/L (ref 20–33)
CO2 SERPL-SCNC: 18 MMOL/L (ref 20–33)
CO2 SERPL-SCNC: 19 MMOL/L (ref 20–33)
CO2 SERPL-SCNC: 20 MMOL/L (ref 20–33)
CO2 SERPL-SCNC: 21 MMOL/L (ref 20–33)
CO2 SERPL-SCNC: 23 MMOL/L (ref 20–33)
CO2 SERPL-SCNC: 28 MMOL/L (ref 20–33)
CO2 SERPL-SCNC: 8 MMOL/L (ref 20–33)
CO2 SERPL-SCNC: 9 MMOL/L (ref 20–33)
CO2 SERPL-SCNC: 9 MMOL/L (ref 20–33)
COLOR UR: ABNORMAL
COLOR UR: YELLOW
COMMENT 1642: NORMAL
COMMENT NL1176: NORMAL
COMPONENT R 8504R: NORMAL
COMPONENT R 8504R: NORMAL
CORTIS SERPL-MCNC: 13.3 UG/DL (ref 0–23)
CREAT SERPL-MCNC: 1.56 MG/DL (ref 0.5–1.4)
CREAT SERPL-MCNC: 1.6 MG/DL (ref 0.5–1.4)
CREAT SERPL-MCNC: 1.63 MG/DL (ref 0.5–1.4)
CREAT SERPL-MCNC: 1.68 MG/DL (ref 0.5–1.4)
CREAT SERPL-MCNC: 1.71 MG/DL (ref 0.5–1.4)
CREAT SERPL-MCNC: 1.83 MG/DL (ref 0.5–1.4)
CREAT SERPL-MCNC: 1.86 MG/DL (ref 0.5–1.4)
CREAT SERPL-MCNC: 2.12 MG/DL (ref 0.5–1.4)
CREAT SERPL-MCNC: 2.19 MG/DL (ref 0.5–1.4)
CREAT SERPL-MCNC: 2.26 MG/DL (ref 0.5–1.4)
CREAT SERPL-MCNC: 2.45 MG/DL (ref 0.5–1.4)
CREAT SERPL-MCNC: 2.63 MG/DL (ref 0.5–1.4)
CREAT SERPL-MCNC: 2.65 MG/DL (ref 0.5–1.4)
CREAT SERPL-MCNC: 3 MG/DL (ref 0.5–1.4)
CREAT SERPL-MCNC: 3.27 MG/DL (ref 0.5–1.4)
CREAT SERPL-MCNC: 3.48 MG/DL (ref 0.5–1.4)
CREAT SERPL-MCNC: 3.5 MG/DL (ref 0.5–1.4)
CREAT SERPL-MCNC: 3.54 MG/DL (ref 0.5–1.4)
CREAT SERPL-MCNC: 3.54 MG/DL (ref 0.5–1.4)
CREAT SERPL-MCNC: 3.55 MG/DL (ref 0.5–1.4)
CREAT SERPL-MCNC: 3.55 MG/DL (ref 0.5–1.4)
CREAT SERPL-MCNC: 3.56 MG/DL (ref 0.5–1.4)
CREAT SERPL-MCNC: 3.57 MG/DL (ref 0.5–1.4)
CREAT SERPL-MCNC: 3.58 MG/DL (ref 0.5–1.4)
CREAT SERPL-MCNC: 3.58 MG/DL (ref 0.5–1.4)
CREAT SERPL-MCNC: 3.59 MG/DL (ref 0.5–1.4)
CREAT SERPL-MCNC: 3.64 MG/DL (ref 0.5–1.4)
CREAT SERPL-MCNC: 3.65 MG/DL (ref 0.5–1.4)
CREAT SERPL-MCNC: 3.7 MG/DL (ref 0.5–1.4)
CREAT SERPL-MCNC: 3.72 MG/DL (ref 0.5–1.4)
CREAT SERPL-MCNC: 4.17 MG/DL (ref 0.5–1.4)
CREAT UR-MCNC: 55.9 MG/DL
CT.EXTRINSIC BLD ROTEM: 3.1 MIN (ref 0.8–2.1)
DELSYS IDSYS: ABNORMAL
DOHLE BOD BLD QL SMEAR: NORMAL
EKG IMPRESSION: NORMAL
END TIDAL CARBON DIOXIDE IECO2: 18
END TIDAL CARBON DIOXIDE IECO2: 18
END TIDAL CARBON DIOXIDE IECO2: 20
END TIDAL CARBON DIOXIDE IECO2: 21
EOSINOPHIL # BLD AUTO: 0 K/UL (ref 0–0.51)
EOSINOPHIL NFR BLD: 0 % (ref 0–6.9)
EPITHELIAL CELLS 1715: ABNORMAL /HPF (ref 0–5)
EPITHELIAL CELLS 1715: ABNORMAL /HPF (ref 0–5)
EPITHELIAL CELLS RENAL  1715R: PRESENT /HPF
ERYTHROCYTE [DISTWIDTH] IN BLOOD BY AUTOMATED COUNT: 56.2 FL (ref 35.9–50)
ERYTHROCYTE [DISTWIDTH] IN BLOOD BY AUTOMATED COUNT: 56.3 FL (ref 35.9–50)
ERYTHROCYTE [DISTWIDTH] IN BLOOD BY AUTOMATED COUNT: 57.1 FL (ref 35.9–50)
ERYTHROCYTE [DISTWIDTH] IN BLOOD BY AUTOMATED COUNT: 57.5 FL (ref 35.9–50)
ERYTHROCYTE [DISTWIDTH] IN BLOOD BY AUTOMATED COUNT: 58 FL (ref 35.9–50)
ERYTHROCYTE [DISTWIDTH] IN BLOOD BY AUTOMATED COUNT: 58.4 FL (ref 35.9–50)
ERYTHROCYTE [DISTWIDTH] IN BLOOD BY AUTOMATED COUNT: 59.1 FL (ref 35.9–50)
ERYTHROCYTE [DISTWIDTH] IN BLOOD BY AUTOMATED COUNT: 59.3 FL (ref 35.9–50)
ERYTHROCYTE [DISTWIDTH] IN BLOOD BY AUTOMATED COUNT: 59.6 FL (ref 35.9–50)
ERYTHROCYTE [DISTWIDTH] IN BLOOD BY AUTOMATED COUNT: 60.3 FL (ref 35.9–50)
ERYTHROCYTE [DISTWIDTH] IN BLOOD BY AUTOMATED COUNT: 60.5 FL (ref 35.9–50)
ERYTHROCYTE [DISTWIDTH] IN BLOOD BY AUTOMATED COUNT: 60.8 FL (ref 35.9–50)
ERYTHROCYTE [DISTWIDTH] IN BLOOD BY AUTOMATED COUNT: 60.9 FL (ref 35.9–50)
ERYTHROCYTE [DISTWIDTH] IN BLOOD BY AUTOMATED COUNT: 61.5 FL (ref 35.9–50)
ERYTHROCYTE [DISTWIDTH] IN BLOOD BY AUTOMATED COUNT: 61.9 FL (ref 35.9–50)
ERYTHROCYTE [DISTWIDTH] IN BLOOD BY AUTOMATED COUNT: 67.9 FL (ref 35.9–50)
GFR SERPLBLD CREATININE-BSD FMLA CKD-EPI: 11 ML/MIN/1.73 M 2
GFR SERPLBLD CREATININE-BSD FMLA CKD-EPI: 13 ML/MIN/1.73 M 2
GFR SERPLBLD CREATININE-BSD FMLA CKD-EPI: 14 ML/MIN/1.73 M 2
GFR SERPLBLD CREATININE-BSD FMLA CKD-EPI: 15 ML/MIN/1.73 M 2
GFR SERPLBLD CREATININE-BSD FMLA CKD-EPI: 17 ML/MIN/1.73 M 2
GFR SERPLBLD CREATININE-BSD FMLA CKD-EPI: 19 ML/MIN/1.73 M 2
GFR SERPLBLD CREATININE-BSD FMLA CKD-EPI: 20 ML/MIN/1.73 M 2
GFR SERPLBLD CREATININE-BSD FMLA CKD-EPI: 21 ML/MIN/1.73 M 2
GFR SERPLBLD CREATININE-BSD FMLA CKD-EPI: 23 ML/MIN/1.73 M 2
GFR SERPLBLD CREATININE-BSD FMLA CKD-EPI: 24 ML/MIN/1.73 M 2
GFR SERPLBLD CREATININE-BSD FMLA CKD-EPI: 25 ML/MIN/1.73 M 2
GFR SERPLBLD CREATININE-BSD FMLA CKD-EPI: 30 ML/MIN/1.73 M 2
GFR SERPLBLD CREATININE-BSD FMLA CKD-EPI: 30 ML/MIN/1.73 M 2
GFR SERPLBLD CREATININE-BSD FMLA CKD-EPI: 33 ML/MIN/1.73 M 2
GFR SERPLBLD CREATININE-BSD FMLA CKD-EPI: 33 ML/MIN/1.73 M 2
GFR SERPLBLD CREATININE-BSD FMLA CKD-EPI: 35 ML/MIN/1.73 M 2
GFR SERPLBLD CREATININE-BSD FMLA CKD-EPI: 35 ML/MIN/1.73 M 2
GFR SERPLBLD CREATININE-BSD FMLA CKD-EPI: 37 ML/MIN/1.73 M 2
GLOBULIN SER CALC-MCNC: 3.3 G/DL (ref 1.9–3.5)
GLOBULIN SER CALC-MCNC: 3.6 G/DL (ref 1.9–3.5)
GLOBULIN SER CALC-MCNC: 3.7 G/DL (ref 1.9–3.5)
GLOBULIN SER CALC-MCNC: 3.7 G/DL (ref 1.9–3.5)
GLOBULIN SER CALC-MCNC: 3.8 G/DL (ref 1.9–3.5)
GLOBULIN SER CALC-MCNC: 3.8 G/DL (ref 1.9–3.5)
GLOBULIN SER CALC-MCNC: 4 G/DL (ref 1.9–3.5)
GLOBULIN SER CALC-MCNC: 4.1 G/DL (ref 1.9–3.5)
GLOBULIN SER CALC-MCNC: 4.2 G/DL (ref 1.9–3.5)
GLOBULIN SER CALC-MCNC: 4.2 G/DL (ref 1.9–3.5)
GLOBULIN SER CALC-MCNC: 4.3 G/DL (ref 1.9–3.5)
GLOBULIN SER CALC-MCNC: 4.4 G/DL (ref 1.9–3.5)
GLUCOSE BLD STRIP.AUTO-MCNC: 102 MG/DL (ref 65–99)
GLUCOSE BLD STRIP.AUTO-MCNC: 105 MG/DL (ref 65–99)
GLUCOSE BLD STRIP.AUTO-MCNC: 107 MG/DL (ref 65–99)
GLUCOSE BLD STRIP.AUTO-MCNC: 107 MG/DL (ref 65–99)
GLUCOSE BLD STRIP.AUTO-MCNC: 108 MG/DL (ref 65–99)
GLUCOSE BLD STRIP.AUTO-MCNC: 109 MG/DL (ref 65–99)
GLUCOSE BLD STRIP.AUTO-MCNC: 109 MG/DL (ref 65–99)
GLUCOSE BLD STRIP.AUTO-MCNC: 110 MG/DL (ref 65–99)
GLUCOSE BLD STRIP.AUTO-MCNC: 111 MG/DL (ref 65–99)
GLUCOSE BLD STRIP.AUTO-MCNC: 112 MG/DL (ref 65–99)
GLUCOSE BLD STRIP.AUTO-MCNC: 114 MG/DL (ref 65–99)
GLUCOSE BLD STRIP.AUTO-MCNC: 115 MG/DL (ref 65–99)
GLUCOSE BLD STRIP.AUTO-MCNC: 117 MG/DL (ref 65–99)
GLUCOSE BLD STRIP.AUTO-MCNC: 118 MG/DL (ref 65–99)
GLUCOSE BLD STRIP.AUTO-MCNC: 119 MG/DL (ref 65–99)
GLUCOSE BLD STRIP.AUTO-MCNC: 120 MG/DL (ref 65–99)
GLUCOSE BLD STRIP.AUTO-MCNC: 121 MG/DL (ref 65–99)
GLUCOSE BLD STRIP.AUTO-MCNC: 122 MG/DL (ref 65–99)
GLUCOSE BLD STRIP.AUTO-MCNC: 122 MG/DL (ref 65–99)
GLUCOSE BLD STRIP.AUTO-MCNC: 124 MG/DL (ref 65–99)
GLUCOSE BLD STRIP.AUTO-MCNC: 124 MG/DL (ref 65–99)
GLUCOSE BLD STRIP.AUTO-MCNC: 127 MG/DL (ref 65–99)
GLUCOSE BLD STRIP.AUTO-MCNC: 136 MG/DL (ref 65–99)
GLUCOSE BLD STRIP.AUTO-MCNC: 148 MG/DL (ref 65–99)
GLUCOSE BLD STRIP.AUTO-MCNC: 151 MG/DL (ref 65–99)
GLUCOSE BLD STRIP.AUTO-MCNC: 161 MG/DL (ref 65–99)
GLUCOSE BLD STRIP.AUTO-MCNC: 163 MG/DL (ref 65–99)
GLUCOSE BLD STRIP.AUTO-MCNC: 167 MG/DL (ref 65–99)
GLUCOSE BLD STRIP.AUTO-MCNC: 176 MG/DL (ref 65–99)
GLUCOSE BLD STRIP.AUTO-MCNC: 176 MG/DL (ref 65–99)
GLUCOSE BLD STRIP.AUTO-MCNC: 182 MG/DL (ref 65–99)
GLUCOSE BLD STRIP.AUTO-MCNC: 184 MG/DL (ref 65–99)
GLUCOSE BLD STRIP.AUTO-MCNC: 186 MG/DL (ref 65–99)
GLUCOSE BLD STRIP.AUTO-MCNC: 197 MG/DL (ref 65–99)
GLUCOSE BLD STRIP.AUTO-MCNC: 197 MG/DL (ref 65–99)
GLUCOSE BLD STRIP.AUTO-MCNC: 201 MG/DL (ref 65–99)
GLUCOSE BLD STRIP.AUTO-MCNC: 207 MG/DL (ref 65–99)
GLUCOSE BLD STRIP.AUTO-MCNC: 208 MG/DL (ref 65–99)
GLUCOSE BLD STRIP.AUTO-MCNC: 256 MG/DL (ref 65–99)
GLUCOSE BLD STRIP.AUTO-MCNC: 258 MG/DL (ref 65–99)
GLUCOSE BLD STRIP.AUTO-MCNC: 274 MG/DL (ref 65–99)
GLUCOSE BLD STRIP.AUTO-MCNC: 278 MG/DL (ref 65–99)
GLUCOSE BLD STRIP.AUTO-MCNC: 315 MG/DL (ref 65–99)
GLUCOSE BLD STRIP.AUTO-MCNC: 320 MG/DL (ref 65–99)
GLUCOSE BLD STRIP.AUTO-MCNC: 54 MG/DL (ref 65–99)
GLUCOSE BLD STRIP.AUTO-MCNC: 80 MG/DL (ref 65–99)
GLUCOSE BLD STRIP.AUTO-MCNC: 86 MG/DL (ref 65–99)
GLUCOSE BLD STRIP.AUTO-MCNC: 86 MG/DL (ref 65–99)
GLUCOSE BLD STRIP.AUTO-MCNC: 93 MG/DL (ref 65–99)
GLUCOSE BLD STRIP.AUTO-MCNC: 94 MG/DL (ref 65–99)
GLUCOSE BLD STRIP.AUTO-MCNC: 97 MG/DL (ref 65–99)
GLUCOSE BLD STRIP.AUTO-MCNC: 98 MG/DL (ref 65–99)
GLUCOSE SERPL-MCNC: 100 MG/DL (ref 65–99)
GLUCOSE SERPL-MCNC: 105 MG/DL (ref 65–99)
GLUCOSE SERPL-MCNC: 111 MG/DL (ref 65–99)
GLUCOSE SERPL-MCNC: 112 MG/DL (ref 65–99)
GLUCOSE SERPL-MCNC: 116 MG/DL (ref 65–99)
GLUCOSE SERPL-MCNC: 122 MG/DL (ref 65–99)
GLUCOSE SERPL-MCNC: 144 MG/DL (ref 65–99)
GLUCOSE SERPL-MCNC: 151 MG/DL (ref 65–99)
GLUCOSE SERPL-MCNC: 154 MG/DL (ref 65–99)
GLUCOSE SERPL-MCNC: 164 MG/DL (ref 65–99)
GLUCOSE SERPL-MCNC: 174 MG/DL (ref 65–99)
GLUCOSE SERPL-MCNC: 175 MG/DL (ref 65–99)
GLUCOSE SERPL-MCNC: 201 MG/DL (ref 65–99)
GLUCOSE SERPL-MCNC: 206 MG/DL (ref 65–99)
GLUCOSE SERPL-MCNC: 218 MG/DL (ref 65–99)
GLUCOSE SERPL-MCNC: 257 MG/DL (ref 65–99)
GLUCOSE SERPL-MCNC: 267 MG/DL (ref 65–99)
GLUCOSE SERPL-MCNC: 274 MG/DL (ref 65–99)
GLUCOSE SERPL-MCNC: 275 MG/DL (ref 65–99)
GLUCOSE SERPL-MCNC: 280 MG/DL (ref 65–99)
GLUCOSE SERPL-MCNC: 289 MG/DL (ref 65–99)
GLUCOSE SERPL-MCNC: 300 MG/DL (ref 65–99)
GLUCOSE SERPL-MCNC: 302 MG/DL (ref 65–99)
GLUCOSE SERPL-MCNC: 313 MG/DL (ref 65–99)
GLUCOSE SERPL-MCNC: 324 MG/DL (ref 65–99)
GLUCOSE SERPL-MCNC: 330 MG/DL (ref 65–99)
GLUCOSE SERPL-MCNC: 363 MG/DL (ref 65–99)
GLUCOSE SERPL-MCNC: 411 MG/DL (ref 65–99)
GLUCOSE SERPL-MCNC: 65 MG/DL (ref 65–99)
GLUCOSE SERPL-MCNC: 98 MG/DL (ref 65–99)
GLUCOSE SERPL-MCNC: 99 MG/DL (ref 65–99)
GLUCOSE UR STRIP.AUTO-MCNC: NEGATIVE MG/DL
GLUCOSE UR STRIP.AUTO-MCNC: NEGATIVE MG/DL
GRAM STN SPEC: ABNORMAL
GRAM STN SPEC: NORMAL
HCO3 BLDA-SCNC: 12 MMOL/L (ref 21–28)
HCO3 BLDA-SCNC: 12 MMOL/L (ref 21–28)
HCO3 BLDA-SCNC: 14 MMOL/L (ref 21–28)
HCO3 BLDA-SCNC: 17 MMOL/L (ref 21–28)
HCO3 BLDA-SCNC: 24 MMOL/L (ref 21–28)
HCO3 BLDA-SCNC: 9 MMOL/L (ref 21–28)
HCT VFR BLD AUTO: 20.6 % (ref 37–47)
HCT VFR BLD AUTO: 20.6 % (ref 37–47)
HCT VFR BLD AUTO: 21.8 % (ref 37–47)
HCT VFR BLD AUTO: 22.3 % (ref 37–47)
HCT VFR BLD AUTO: 22.4 % (ref 37–47)
HCT VFR BLD AUTO: 22.5 % (ref 37–47)
HCT VFR BLD AUTO: 22.5 % (ref 37–47)
HCT VFR BLD AUTO: 23.3 % (ref 37–47)
HCT VFR BLD AUTO: 24.1 % (ref 37–47)
HCT VFR BLD AUTO: 25 % (ref 37–47)
HCT VFR BLD AUTO: 26 % (ref 37–47)
HCT VFR BLD AUTO: 26 % (ref 37–47)
HCT VFR BLD AUTO: 26.2 % (ref 37–47)
HCT VFR BLD AUTO: 26.3 % (ref 37–47)
HCT VFR BLD AUTO: 26.3 % (ref 37–47)
HCT VFR BLD AUTO: 27.1 % (ref 37–47)
HCT VFR BLD AUTO: 27.5 % (ref 37–47)
HCT VFR BLD AUTO: 30.7 % (ref 37–47)
HGB BLD-MCNC: 6.3 G/DL (ref 12–16)
HGB BLD-MCNC: 6.4 G/DL (ref 12–16)
HGB BLD-MCNC: 7 G/DL (ref 12–16)
HGB BLD-MCNC: 7.2 G/DL (ref 12–16)
HGB BLD-MCNC: 7.4 G/DL (ref 12–16)
HGB BLD-MCNC: 7.5 G/DL (ref 12–16)
HGB BLD-MCNC: 7.5 G/DL (ref 12–16)
HGB BLD-MCNC: 7.6 G/DL (ref 12–16)
HGB BLD-MCNC: 7.6 G/DL (ref 12–16)
HGB BLD-MCNC: 7.9 G/DL (ref 12–16)
HGB BLD-MCNC: 8 G/DL (ref 12–16)
HGB BLD-MCNC: 8 G/DL (ref 12–16)
HGB BLD-MCNC: 8.1 G/DL (ref 12–16)
HGB BLD-MCNC: 8.4 G/DL (ref 12–16)
HGB BLD-MCNC: 8.4 G/DL (ref 12–16)
HGB BLD-MCNC: 8.5 G/DL (ref 12–16)
HGB BLD-MCNC: 9 G/DL (ref 12–16)
HGB BLD-MCNC: 9.6 G/DL (ref 12–16)
IMM GRANULOCYTES # BLD AUTO: 0.09 K/UL (ref 0–0.11)
IMM GRANULOCYTES NFR BLD AUTO: 0.9 % (ref 0–0.9)
INR PPP: 1.89 (ref 0.87–1.13)
INR PPP: 2.39 (ref 0.87–1.13)
INR PPP: 2.81 (ref 0.87–1.13)
INST. QUALIFIED PATIENT IIQPT: YES
KETONES UR STRIP.AUTO-MCNC: NEGATIVE MG/DL
KETONES UR STRIP.AUTO-MCNC: NEGATIVE MG/DL
LACTATE BLD-SCNC: 2.39 MMOL/L (ref 0.5–2)
LACTATE BLD-SCNC: 2.6 MMOL/L (ref 0.5–2)
LACTATE BLD-SCNC: 3.44 MMOL/L (ref 0.5–2)
LACTATE BLD-SCNC: 4.4 MMOL/L (ref 0.5–2)
LACTATE SERPL-SCNC: 1.2 MMOL/L (ref 0.5–2)
LACTATE SERPL-SCNC: 1.5 MMOL/L (ref 0.5–2)
LACTATE SERPL-SCNC: 2.4 MMOL/L (ref 0.5–2)
LACTATE SERPL-SCNC: 2.4 MMOL/L (ref 0.5–2)
LACTATE SERPL-SCNC: 2.5 MMOL/L (ref 0.5–2)
LACTATE SERPL-SCNC: 2.5 MMOL/L (ref 0.5–2)
LACTATE SERPL-SCNC: 2.7 MMOL/L (ref 0.5–2)
LACTATE SERPL-SCNC: 2.7 MMOL/L (ref 0.5–2)
LACTATE SERPL-SCNC: 3.2 MMOL/L (ref 0.5–2)
LACTATE SERPL-SCNC: 3.3 MMOL/L (ref 0.5–2)
LACTATE SERPL-SCNC: 3.7 MMOL/L (ref 0.5–2)
LACTATE SERPL-SCNC: 3.8 MMOL/L (ref 0.5–2)
LACTATE SERPL-SCNC: 4.5 MMOL/L (ref 0.5–2)
LACTATE SERPL-SCNC: 4.8 MMOL/L (ref 0.5–2)
LACTATE SERPL-SCNC: 4.8 MMOL/L (ref 0.5–2)
LACTATE SERPL-SCNC: 5.2 MMOL/L (ref 0.5–2)
LACTATE SERPL-SCNC: 5.3 MMOL/L (ref 0.5–2)
LACTATE SERPL-SCNC: 5.4 MMOL/L (ref 0.5–2)
LDH SERPL L TO P-CCNC: 263 U/L (ref 107–266)
LEUKOCYTE ESTERASE UR QL STRIP.AUTO: ABNORMAL
LEUKOCYTE ESTERASE UR QL STRIP.AUTO: ABNORMAL
LV EJECT FRACT  99904: 59
LV EJECT FRACT MOD 2C 99903: 63.08
LV EJECT FRACT MOD 4C 99902: 49.32
LV EJECT FRACT MOD BP 99901: 58.61
LYMPHOCYTES # BLD AUTO: 0 K/UL (ref 1–4.8)
LYMPHOCYTES # BLD AUTO: 0.14 K/UL (ref 1–4.8)
LYMPHOCYTES # BLD AUTO: 0.29 K/UL (ref 1–4.8)
LYMPHOCYTES # BLD AUTO: 0.39 K/UL (ref 1–4.8)
LYMPHOCYTES # BLD AUTO: 1.06 K/UL (ref 1–4.8)
LYMPHOCYTES NFR BLD: 0 % (ref 22–41)
LYMPHOCYTES NFR BLD: 1.7 % (ref 22–41)
LYMPHOCYTES NFR BLD: 10.6 % (ref 22–41)
LYMPHOCYTES NFR BLD: 4.4 % (ref 22–41)
LYMPHOCYTES NFR BLD: 5.1 % (ref 22–41)
Lab: ABNORMAL
MACROCYTES BLD QL SMEAR: ABNORMAL
MAGNESIUM SERPL-MCNC: 1.5 MG/DL (ref 1.5–2.5)
MAGNESIUM SERPL-MCNC: 1.8 MG/DL (ref 1.5–2.5)
MAGNESIUM SERPL-MCNC: 2.1 MG/DL (ref 1.5–2.5)
MAGNESIUM SERPL-MCNC: 2.1 MG/DL (ref 1.5–2.5)
MAGNESIUM SERPL-MCNC: 2.2 MG/DL (ref 1.5–2.5)
MANUAL DIFF BLD: ABNORMAL
MANUAL DIFF BLD: NORMAL
MCF BLD TEG: 41.8 MM (ref 52–69)
MCF.PLATELET INHIB BLD ROTEM: 13 MM (ref 15–32)
MCH RBC QN AUTO: 25.7 PG (ref 27–33)
MCH RBC QN AUTO: 25.9 PG (ref 27–33)
MCH RBC QN AUTO: 26 PG (ref 27–33)
MCH RBC QN AUTO: 26.1 PG (ref 27–33)
MCH RBC QN AUTO: 26.2 PG (ref 27–33)
MCH RBC QN AUTO: 26.3 PG (ref 27–33)
MCH RBC QN AUTO: 26.4 PG (ref 27–33)
MCH RBC QN AUTO: 26.5 PG (ref 27–33)
MCH RBC QN AUTO: 26.6 PG (ref 27–33)
MCH RBC QN AUTO: 26.6 PG (ref 27–33)
MCH RBC QN AUTO: 26.8 PG (ref 27–33)
MCH RBC QN AUTO: 27 PG (ref 27–33)
MCHC RBC AUTO-ENTMCNC: 29.2 G/DL (ref 32.2–35.5)
MCHC RBC AUTO-ENTMCNC: 30.4 G/DL (ref 32.2–35.5)
MCHC RBC AUTO-ENTMCNC: 30.6 G/DL (ref 32.2–35.5)
MCHC RBC AUTO-ENTMCNC: 31.1 G/DL (ref 32.2–35.5)
MCHC RBC AUTO-ENTMCNC: 31.3 G/DL (ref 32.2–35.5)
MCHC RBC AUTO-ENTMCNC: 32 G/DL (ref 32.2–35.5)
MCHC RBC AUTO-ENTMCNC: 32.1 G/DL (ref 32.2–35.5)
MCHC RBC AUTO-ENTMCNC: 32.1 G/DL (ref 32.2–35.5)
MCHC RBC AUTO-ENTMCNC: 32.2 G/DL (ref 32.2–35.5)
MCHC RBC AUTO-ENTMCNC: 32.3 G/DL (ref 32.2–35.5)
MCHC RBC AUTO-ENTMCNC: 32.3 G/DL (ref 32.2–35.5)
MCHC RBC AUTO-ENTMCNC: 32.7 G/DL (ref 32.2–35.5)
MCHC RBC AUTO-ENTMCNC: 32.9 G/DL (ref 32.2–35.5)
MCHC RBC AUTO-ENTMCNC: 33.6 G/DL (ref 32.2–35.5)
MCHC RBC AUTO-ENTMCNC: 33.6 G/DL (ref 32.2–35.5)
MCHC RBC AUTO-ENTMCNC: 33.9 G/DL (ref 32.2–35.5)
MCV RBC AUTO: 77 FL (ref 81.4–97.8)
MCV RBC AUTO: 78.2 FL (ref 81.4–97.8)
MCV RBC AUTO: 78.5 FL (ref 81.4–97.8)
MCV RBC AUTO: 79.2 FL (ref 81.4–97.8)
MCV RBC AUTO: 80.9 FL (ref 81.4–97.8)
MCV RBC AUTO: 81 FL (ref 81.4–97.8)
MCV RBC AUTO: 81.2 FL (ref 81.4–97.8)
MCV RBC AUTO: 81.4 FL (ref 81.4–97.8)
MCV RBC AUTO: 81.5 FL (ref 81.4–97.8)
MCV RBC AUTO: 82.3 FL (ref 81.4–97.8)
MCV RBC AUTO: 82.7 FL (ref 81.4–97.8)
MCV RBC AUTO: 83.4 FL (ref 81.4–97.8)
MCV RBC AUTO: 84.5 FL (ref 81.4–97.8)
MCV RBC AUTO: 85.5 FL (ref 81.4–97.8)
MCV RBC AUTO: 86.9 FL (ref 81.4–97.8)
MCV RBC AUTO: 90.9 FL (ref 81.4–97.8)
METAMYELOCYTES NFR BLD MANUAL: 0.8 %
METAMYELOCYTES NFR BLD MANUAL: 0.9 %
MICRO URNS: ABNORMAL
MICRO URNS: ABNORMAL
MICROCYTES BLD QL SMEAR: ABNORMAL
MODE IMODE: ABNORMAL
MONOCYTES # BLD AUTO: 0.2 K/UL (ref 0–0.85)
MONOCYTES # BLD AUTO: 0.49 K/UL (ref 0–0.85)
MONOCYTES NFR BLD AUTO: 1.7 % (ref 0–13.4)
MONOCYTES NFR BLD AUTO: 1.7 % (ref 0–13.4)
MONOCYTES NFR BLD AUTO: 2.5 % (ref 0–13.4)
MONOCYTES NFR BLD AUTO: 2.6 % (ref 0–13.4)
MONOCYTES NFR BLD AUTO: 4.9 % (ref 0–13.4)
MORPHOLOGY BLD-IMP: NORMAL
NEUTROPHILS # BLD AUTO: 5.12 K/UL (ref 1.82–7.42)
NEUTROPHILS # BLD AUTO: 8.08 K/UL (ref 1.82–7.42)
NEUTROPHILS # BLD AUTO: 8.26 K/UL (ref 1.82–7.42)
NEUTROPHILS # BLD AUTO: 8.38 K/UL (ref 1.82–7.42)
NEUTROPHILS # BLD AUTO: 9.83 K/UL (ref 1.82–7.42)
NEUTROPHILS NFR BLD: 64.1 % (ref 44–72)
NEUTROPHILS NFR BLD: 83.4 % (ref 44–72)
NEUTROPHILS NFR BLD: 89.9 % (ref 44–72)
NEUTROPHILS NFR BLD: 93.9 % (ref 44–72)
NEUTROPHILS NFR BLD: 97.4 % (ref 44–72)
NEUTS BAND NFR BLD MANUAL: 0.9 % (ref 0–10)
NEUTS BAND NFR BLD MANUAL: 27.3 % (ref 0–10)
NEUTS BAND NFR BLD MANUAL: 5.1 % (ref 0–10)
NITRITE UR QL STRIP.AUTO: NEGATIVE
NITRITE UR QL STRIP.AUTO: NEGATIVE
NRBC # BLD AUTO: 0.02 K/UL
NRBC # BLD AUTO: 0.02 K/UL
NRBC # BLD AUTO: 0.05 K/UL
NRBC # BLD AUTO: 0.06 K/UL
NRBC # BLD AUTO: 0.11 K/UL
NRBC BLD-RTO: 0.2 /100 WBC (ref 0–0.2)
NRBC BLD-RTO: 0.2 /100 WBC (ref 0–0.2)
NRBC BLD-RTO: 0.6 /100 WBC (ref 0–0.2)
NRBC BLD-RTO: 0.7 /100 WBC (ref 0–0.2)
NRBC BLD-RTO: 2 /100 WBC (ref 0–0.2)
O2/TOTAL GAS SETTING VFR VENT: 100 %
O2/TOTAL GAS SETTING VFR VENT: 100 %
O2/TOTAL GAS SETTING VFR VENT: 40 %
OVALOCYTES BLD QL SMEAR: NORMAL
PA AA BLD-ACNC: ABNORMAL % (ref 0–11)
PA ADP BLD-ACNC: ABNORMAL % (ref 0–17)
PCO2 BLDA: 20 MMHG (ref 32–48)
PCO2 BLDA: 21 MMHG (ref 32–48)
PCO2 BLDA: 23 MMHG (ref 32–48)
PCO2 BLDA: 24 MMHG (ref 32–48)
PCO2 BLDA: 34 MMHG (ref 32–48)
PCO2 BLDA: 36.1 MMHG (ref 32–48)
PCO2 TEMP ADJ BLDA: 19 MMHG (ref 32–48)
PCO2 TEMP ADJ BLDA: 21 MMHG (ref 32–48)
PCO2 TEMP ADJ BLDA: 21 MMHG (ref 32–48)
PCO2 TEMP ADJ BLDA: 22 MMHG (ref 32–48)
PCO2 TEMP ADJ BLDA: 32 MMHG (ref 32–48)
PCO2 TEMP ADJ BLDA: 35.5 MMHG (ref 32–48)
PEEP END EXPIRATORY PRESSURE IPEEP: 8
PH BLDA: 7.16 [PH] (ref 7.35–7.45)
PH BLDA: 7.2 [PH] (ref 7.35–7.45)
PH BLDA: 7.33 [PH] (ref 7.35–7.45)
PH BLDA: 7.42 [PH] (ref 7.35–7.45)
PH BLDA: 7.43 [PH] (ref 7.35–7.45)
PH BLDA: 7.55 [PH] (ref 7.35–7.45)
PH TEMP ADJ BLDA: 7.18 [PH] (ref 7.35–7.45)
PH TEMP ADJ BLDA: 7.22 [PH] (ref 7.35–7.45)
PH TEMP ADJ BLDA: 7.36 [PH] (ref 7.35–7.45)
PH TEMP ADJ BLDA: 7.43 [PH] (ref 7.35–7.45)
PH TEMP ADJ BLDA: 7.43 [PH] (ref 7.35–7.45)
PH TEMP ADJ BLDA: 7.56 [PH] (ref 7.35–7.45)
PH UR STRIP.AUTO: 5.5 [PH] (ref 5–8)
PH UR STRIP.AUTO: 7 [PH] (ref 5–8)
PHOSPHATE SERPL-MCNC: 3.2 MG/DL (ref 2.5–4.5)
PHOSPHATE SERPL-MCNC: 4.4 MG/DL (ref 2.5–4.5)
PHOSPHATE SERPL-MCNC: 4.7 MG/DL (ref 2.5–4.5)
PHOSPHATE SERPL-MCNC: 4.7 MG/DL (ref 2.5–4.5)
PHOSPHATE SERPL-MCNC: 6 MG/DL (ref 2.5–4.5)
PHOSPHATE SERPL-MCNC: 7 MG/DL (ref 2.5–4.5)
PLATELET # BLD AUTO: 123 K/UL (ref 164–446)
PLATELET # BLD AUTO: 13 K/UL (ref 164–446)
PLATELET # BLD AUTO: 14 K/UL (ref 164–446)
PLATELET # BLD AUTO: 159 K/UL (ref 164–446)
PLATELET # BLD AUTO: 16 K/UL (ref 164–446)
PLATELET # BLD AUTO: 18 K/UL (ref 164–446)
PLATELET # BLD AUTO: 20 K/UL (ref 164–446)
PLATELET # BLD AUTO: 21 K/UL (ref 164–446)
PLATELET # BLD AUTO: 22 K/UL (ref 164–446)
PLATELET # BLD AUTO: 22 K/UL (ref 164–446)
PLATELET # BLD AUTO: 25 K/UL (ref 164–446)
PLATELET # BLD AUTO: 31 K/UL (ref 164–446)
PLATELET # BLD AUTO: 32 K/UL (ref 164–446)
PLATELET # BLD AUTO: 34 K/UL (ref 164–446)
PLATELET # BLD AUTO: 56 K/UL (ref 164–446)
PLATELET # BLD AUTO: 77 K/UL (ref 164–446)
PLATELET BLD QL SMEAR: NORMAL
PLATELETS.RETICULATED NFR BLD AUTO: 1.2 % (ref 0.6–13.1)
PLATELETS.RETICULATED NFR BLD AUTO: 1.2 % (ref 0.6–13.1)
PLATELETS.RETICULATED NFR BLD AUTO: 1.3 % (ref 0.6–13.1)
PLATELETS.RETICULATED NFR BLD AUTO: 1.5 % (ref 0.6–13.1)
PLATELETS.RETICULATED NFR BLD AUTO: 1.5 % (ref 0.6–13.1)
PLATELETS.RETICULATED NFR BLD AUTO: 2 % (ref 0.6–13.1)
PLATELETS.RETICULATED NFR BLD AUTO: 2 % (ref 0.6–13.1)
PLATELETS.RETICULATED NFR BLD AUTO: 2.1 % (ref 0.6–13.1)
PLATELETS.RETICULATED NFR BLD AUTO: 2.8 % (ref 0.6–13.1)
PLATELETS.RETICULATED NFR BLD AUTO: 2.8 % (ref 0.6–13.1)
PLATELETS.RETICULATED NFR BLD AUTO: 3.1 % (ref 0.6–13.1)
PLATELETS.RETICULATED NFR BLD AUTO: 3.4 % (ref 0.6–13.1)
PLATELETS.RETICULATED NFR BLD AUTO: 4.1 % (ref 0.6–13.1)
PLATELETS.RETICULATED NFR BLD AUTO: 5 % (ref 0.6–13.1)
PMV BLD AUTO: 10.5 FL (ref 9–12.9)
PMV BLD AUTO: 10.8 FL (ref 9–12.9)
PMV BLD AUTO: 8.9 FL (ref 9–12.9)
PO2 BLDA: 104 MMHG (ref 83–108)
PO2 BLDA: 347 MMHG (ref 83–108)
PO2 BLDA: 80 MMHG (ref 83–108)
PO2 BLDA: 82 MMHG (ref 83–108)
PO2 BLDA: 84 MMHG (ref 83–108)
PO2 BLDA: 88.7 MMHG (ref 83–108)
PO2 TEMP ADJ BLDA: 101 MMHG (ref 83–108)
PO2 TEMP ADJ BLDA: 339 MMHG (ref 83–108)
PO2 TEMP ADJ BLDA: 72 MMHG (ref 83–108)
PO2 TEMP ADJ BLDA: 74 MMHG (ref 83–108)
PO2 TEMP ADJ BLDA: 81 MMHG (ref 83–108)
PO2 TEMP ADJ BLDA: 86.4 MMHG (ref 83–108)
POIKILOCYTOSIS BLD QL SMEAR: NORMAL
POTASSIUM SERPL-SCNC: 2.7 MMOL/L (ref 3.6–5.5)
POTASSIUM SERPL-SCNC: 2.9 MMOL/L (ref 3.6–5.5)
POTASSIUM SERPL-SCNC: 2.9 MMOL/L (ref 3.6–5.5)
POTASSIUM SERPL-SCNC: 3.1 MMOL/L (ref 3.6–5.5)
POTASSIUM SERPL-SCNC: 3.1 MMOL/L (ref 3.6–5.5)
POTASSIUM SERPL-SCNC: 3.3 MMOL/L (ref 3.6–5.5)
POTASSIUM SERPL-SCNC: 3.4 MMOL/L (ref 3.6–5.5)
POTASSIUM SERPL-SCNC: 3.5 MMOL/L (ref 3.6–5.5)
POTASSIUM SERPL-SCNC: 3.6 MMOL/L (ref 3.6–5.5)
POTASSIUM SERPL-SCNC: 3.7 MMOL/L (ref 3.6–5.5)
POTASSIUM SERPL-SCNC: 3.9 MMOL/L (ref 3.6–5.5)
POTASSIUM SERPL-SCNC: 4.1 MMOL/L (ref 3.6–5.5)
POTASSIUM SERPL-SCNC: 4.2 MMOL/L (ref 3.6–5.5)
POTASSIUM SERPL-SCNC: 4.2 MMOL/L (ref 3.6–5.5)
POTASSIUM SERPL-SCNC: 4.3 MMOL/L (ref 3.6–5.5)
POTASSIUM SERPL-SCNC: 4.3 MMOL/L (ref 3.6–5.5)
POTASSIUM SERPL-SCNC: 4.4 MMOL/L (ref 3.6–5.5)
POTASSIUM SERPL-SCNC: 4.4 MMOL/L (ref 3.6–5.5)
POTASSIUM SERPL-SCNC: 4.5 MMOL/L (ref 3.6–5.5)
POTASSIUM SERPL-SCNC: 4.6 MMOL/L (ref 3.6–5.5)
POTASSIUM SERPL-SCNC: 4.9 MMOL/L (ref 3.6–5.5)
POTASSIUM SERPL-SCNC: 5.1 MMOL/L (ref 3.6–5.5)
POTASSIUM SERPL-SCNC: 5.1 MMOL/L (ref 3.6–5.5)
POTASSIUM SERPL-SCNC: 5.2 MMOL/L (ref 3.6–5.5)
POTASSIUM SERPL-SCNC: 5.3 MMOL/L (ref 3.6–5.5)
POTASSIUM SERPL-SCNC: 5.3 MMOL/L (ref 3.6–5.5)
POTASSIUM SERPL-SCNC: 6.5 MMOL/L (ref 3.6–5.5)
PROCALCITONIN SERPL-MCNC: 18.3 NG/ML
PRODUCT TYPE UPROD: NORMAL
PRODUCT TYPE UPROD: NORMAL
PROT SERPL-MCNC: 5 G/DL (ref 6–8.2)
PROT SERPL-MCNC: 5.3 G/DL (ref 6–8.2)
PROT SERPL-MCNC: 5.4 G/DL (ref 6–8.2)
PROT SERPL-MCNC: 5.5 G/DL (ref 6–8.2)
PROT SERPL-MCNC: 5.6 G/DL (ref 6–8.2)
PROT SERPL-MCNC: 5.7 G/DL (ref 6–8.2)
PROT SERPL-MCNC: 5.8 G/DL (ref 6–8.2)
PROT SERPL-MCNC: 6.1 G/DL (ref 6–8.2)
PROT SERPL-MCNC: 6.2 G/DL (ref 6–8.2)
PROT SERPL-MCNC: 6.3 G/DL (ref 6–8.2)
PROT SERPL-MCNC: 6.5 G/DL (ref 6–8.2)
PROT UR QL STRIP: 300 MG/DL
PROT UR QL STRIP: >=300 MG/DL
PROTHROMBIN TIME: 21.9 SEC (ref 12–14.6)
PROTHROMBIN TIME: 26.2 SEC (ref 12–14.6)
PROTHROMBIN TIME: 29.8 SEC (ref 12–14.6)
RBC # BLD AUTO: 2.37 M/UL (ref 4.2–5.4)
RBC # BLD AUTO: 2.41 M/UL (ref 4.2–5.4)
RBC # BLD AUTO: 2.69 M/UL (ref 4.2–5.4)
RBC # BLD AUTO: 2.76 M/UL (ref 4.2–5.4)
RBC # BLD AUTO: 2.84 M/UL (ref 4.2–5.4)
RBC # BLD AUTO: 2.84 M/UL (ref 4.2–5.4)
RBC # BLD AUTO: 2.87 M/UL (ref 4.2–5.4)
RBC # BLD AUTO: 2.91 M/UL (ref 4.2–5.4)
RBC # BLD AUTO: 2.96 M/UL (ref 4.2–5.4)
RBC # BLD AUTO: 2.98 M/UL (ref 4.2–5.4)
RBC # BLD AUTO: 3.08 M/UL (ref 4.2–5.4)
RBC # BLD AUTO: 3.14 M/UL (ref 4.2–5.4)
RBC # BLD AUTO: 3.16 M/UL (ref 4.2–5.4)
RBC # BLD AUTO: 3.25 M/UL (ref 4.2–5.4)
RBC # BLD AUTO: 3.38 M/UL (ref 4.2–5.4)
RBC # BLD AUTO: 3.71 M/UL (ref 4.2–5.4)
RBC # URNS HPF: ABNORMAL /HPF (ref 0–2)
RBC # URNS HPF: ABNORMAL /HPF (ref 0–2)
RBC BLD AUTO: PRESENT
RBC UR QL AUTO: ABNORMAL
RBC UR QL AUTO: ABNORMAL
RH BLD: NORMAL
SAO2 % BLDA: 100 % (ref 93–99)
SAO2 % BLDA: 93 % (ref 93–99)
SAO2 % BLDA: 95 % (ref 93–99)
SAO2 % BLDA: 96 % (ref 93–99)
SAO2 % BLDA: 97 % (ref 93–99)
SAO2 % BLDA: 99 % (ref 93–99)
SCCMEC + MECA PNL NOSE NAA+PROBE: POSITIVE
SIGNIFICANT IND 70042: ABNORMAL
SIGNIFICANT IND 70042: NORMAL
SITE SITE: ABNORMAL
SITE SITE: NORMAL
SODIUM SERPL-SCNC: 129 MMOL/L (ref 135–145)
SODIUM SERPL-SCNC: 130 MMOL/L (ref 135–145)
SODIUM SERPL-SCNC: 131 MMOL/L (ref 135–145)
SODIUM SERPL-SCNC: 132 MMOL/L (ref 135–145)
SODIUM SERPL-SCNC: 133 MMOL/L (ref 135–145)
SODIUM SERPL-SCNC: 135 MMOL/L (ref 135–145)
SODIUM SERPL-SCNC: 136 MMOL/L (ref 135–145)
SODIUM SERPL-SCNC: 136 MMOL/L (ref 135–145)
SODIUM SERPL-SCNC: 137 MMOL/L (ref 135–145)
SODIUM SERPL-SCNC: 139 MMOL/L (ref 135–145)
SODIUM SERPL-SCNC: 140 MMOL/L (ref 135–145)
SODIUM UR-SCNC: 89 MMOL/L
SOURCE SOURCE: ABNORMAL
SOURCE SOURCE: NORMAL
SP GR UR STRIP.AUTO: 1.02
SP GR UR STRIP.AUTO: 1.02
SPECIMEN DRAWN FROM PATIENT: ABNORMAL
TEG ALGORITHM TGALG: ABNORMAL
TIDAL VOLUME IVT: 370
TOXIC GRANULES BLD QL SMEAR: NORMAL
TRANS CELLS URNS QL MICRO: PRESENT /HPF
TROPONIN T SERPL-MCNC: 100 NG/L (ref 6–19)
TROPONIN T SERPL-MCNC: 90 NG/L (ref 6–19)
UNIT STATUS USTAT: NORMAL
UNIT STATUS USTAT: NORMAL
UROBILINOGEN UR STRIP.AUTO-MCNC: 0.2 EU/DL
UROBILINOGEN UR STRIP.AUTO-MCNC: 0.2 EU/DL
WBC # BLD AUTO: 10 K/UL (ref 4.8–10.8)
WBC # BLD AUTO: 10 K/UL (ref 4.8–10.8)
WBC # BLD AUTO: 10.3 K/UL (ref 4.8–10.8)
WBC # BLD AUTO: 10.3 K/UL (ref 4.8–10.8)
WBC # BLD AUTO: 10.7 K/UL (ref 4.8–10.8)
WBC # BLD AUTO: 11 K/UL (ref 4.8–10.8)
WBC # BLD AUTO: 5.6 K/UL (ref 4.8–10.8)
WBC # BLD AUTO: 7.6 K/UL (ref 4.8–10.8)
WBC # BLD AUTO: 7.9 K/UL (ref 4.8–10.8)
WBC # BLD AUTO: 8.5 K/UL (ref 4.8–10.8)
WBC # BLD AUTO: 8.7 K/UL (ref 4.8–10.8)
WBC # BLD AUTO: 8.8 K/UL (ref 4.8–10.8)
WBC # BLD AUTO: 8.8 K/UL (ref 4.8–10.8)
WBC # BLD AUTO: 9 K/UL (ref 4.8–10.8)
WBC # BLD AUTO: 9.2 K/UL (ref 4.8–10.8)
WBC # BLD AUTO: 9.5 K/UL (ref 4.8–10.8)
WBC #/AREA URNS HPF: ABNORMAL /HPF
WBC #/AREA URNS HPF: ABNORMAL /HPF
WBC TOXIC VACUOLES BLD QL SMEAR: NORMAL
YEAST BUDDING URNS QL: PRESENT /HPF

## 2025-01-01 PROCEDURE — P9047 ALBUMIN (HUMAN), 25%, 50ML: HCPCS | Mod: JZ

## 2025-01-01 PROCEDURE — 80053 COMPREHEN METABOLIC PANEL: CPT

## 2025-01-01 PROCEDURE — 700102 HCHG RX REV CODE 250 W/ 637 OVERRIDE(OP): Performed by: INTERNAL MEDICINE

## 2025-01-01 PROCEDURE — 99233 SBSQ HOSP IP/OBS HIGH 50: CPT | Performed by: INTERNAL MEDICINE

## 2025-01-01 PROCEDURE — 85576 BLOOD PLATELET AGGREGATION: CPT

## 2025-01-01 PROCEDURE — 36556 INSERT NON-TUNNEL CV CATH: CPT

## 2025-01-01 PROCEDURE — 70450 CT HEAD/BRAIN W/O DYE: CPT

## 2025-01-01 PROCEDURE — A9270 NON-COVERED ITEM OR SERVICE: HCPCS | Performed by: HOSPITALIST

## 2025-01-01 PROCEDURE — 82803 BLOOD GASES ANY COMBINATION: CPT | Performed by: INTERNAL MEDICINE

## 2025-01-01 PROCEDURE — 99233 SBSQ HOSP IP/OBS HIGH 50: CPT | Performed by: HOSPITALIST

## 2025-01-01 PROCEDURE — 82962 GLUCOSE BLOOD TEST: CPT | Performed by: HOSPITALIST

## 2025-01-01 PROCEDURE — 36620 INSERTION CATHETER ARTERY: CPT | Performed by: INTERNAL MEDICINE

## 2025-01-01 PROCEDURE — 700105 HCHG RX REV CODE 258: Performed by: HOSPITALIST

## 2025-01-01 PROCEDURE — 82962 GLUCOSE BLOOD TEST: CPT | Performed by: INTERNAL MEDICINE

## 2025-01-01 PROCEDURE — 700111 HCHG RX REV CODE 636 W/ 250 OVERRIDE (IP): Mod: JZ | Performed by: HOSPITALIST

## 2025-01-01 PROCEDURE — 700102 HCHG RX REV CODE 250 W/ 637 OVERRIDE(OP): Performed by: HOSPITALIST

## 2025-01-01 PROCEDURE — 99292 CRITICAL CARE ADDL 30 MIN: CPT | Performed by: INTERNAL MEDICINE

## 2025-01-01 PROCEDURE — 87040 BLOOD CULTURE FOR BACTERIA: CPT | Mod: 91

## 2025-01-01 PROCEDURE — 94799 UNLISTED PULMONARY SVC/PX: CPT

## 2025-01-01 PROCEDURE — 36430 TRANSFUSION BLD/BLD COMPNT: CPT

## 2025-01-01 PROCEDURE — 770020 HCHG ROOM/CARE - TELE (206)

## 2025-01-01 PROCEDURE — 700111 HCHG RX REV CODE 636 W/ 250 OVERRIDE (IP): Mod: JZ | Performed by: INTERNAL MEDICINE

## 2025-01-01 PROCEDURE — 31624 DX BRONCHOSCOPE/LAVAGE: CPT | Performed by: INTERNAL MEDICINE

## 2025-01-01 PROCEDURE — 85610 PROTHROMBIN TIME: CPT

## 2025-01-01 PROCEDURE — 700111 HCHG RX REV CODE 636 W/ 250 OVERRIDE (IP): Mod: JZ

## 2025-01-01 PROCEDURE — 03HY32Z INSERTION OF MONITORING DEVICE INTO UPPER ARTERY, PERCUTANEOUS APPROACH: ICD-10-PCS | Performed by: STUDENT IN AN ORGANIZED HEALTH CARE EDUCATION/TRAINING PROGRAM

## 2025-01-01 PROCEDURE — 85027 COMPLETE CBC AUTOMATED: CPT

## 2025-01-01 PROCEDURE — 84484 ASSAY OF TROPONIN QUANT: CPT

## 2025-01-01 PROCEDURE — 93306 TTE W/DOPPLER COMPLETE: CPT

## 2025-01-01 PROCEDURE — G0545 PR INHERENT VISIT TO INPT: HCPCS | Performed by: INTERNAL MEDICINE

## 2025-01-01 PROCEDURE — 82140 ASSAY OF AMMONIA: CPT

## 2025-01-01 PROCEDURE — 94002 VENT MGMT INPAT INIT DAY: CPT

## 2025-01-01 PROCEDURE — 85055 RETICULATED PLATELET ASSAY: CPT

## 2025-01-01 PROCEDURE — 82570 ASSAY OF URINE CREATININE: CPT

## 2025-01-01 PROCEDURE — 700105 HCHG RX REV CODE 258: Performed by: INTERNAL MEDICINE

## 2025-01-01 PROCEDURE — 82962 GLUCOSE BLOOD TEST: CPT | Performed by: EMERGENCY MEDICINE

## 2025-01-01 PROCEDURE — 99291 CRITICAL CARE FIRST HOUR: CPT | Mod: 25 | Performed by: INTERNAL MEDICINE

## 2025-01-01 PROCEDURE — 36620 INSERTION CATHETER ARTERY: CPT

## 2025-01-01 PROCEDURE — 95711 VEEG 2-12 HR UNMONITORED: CPT | Performed by: STUDENT IN AN ORGANIZED HEALTH CARE EDUCATION/TRAINING PROGRAM

## 2025-01-01 PROCEDURE — 71275 CT ANGIOGRAPHY CHEST: CPT

## 2025-01-01 PROCEDURE — 87015 SPECIMEN INFECT AGNT CONCNTJ: CPT

## 2025-01-01 PROCEDURE — 93005 ELECTROCARDIOGRAM TRACING: CPT | Mod: TC | Performed by: EMERGENCY MEDICINE

## 2025-01-01 PROCEDURE — 86901 BLOOD TYPING SEROLOGIC RH(D): CPT

## 2025-01-01 PROCEDURE — 51798 US URINE CAPACITY MEASURE: CPT

## 2025-01-01 PROCEDURE — 95718 EEG PHYS/QHP 2-12 HR W/VEEG: CPT | Performed by: STUDENT IN AN ORGANIZED HEALTH CARE EDUCATION/TRAINING PROGRAM

## 2025-01-01 PROCEDURE — 36600 WITHDRAWAL OF ARTERIAL BLOOD: CPT

## 2025-01-01 PROCEDURE — 700101 HCHG RX REV CODE 250: Mod: UD | Performed by: EMERGENCY MEDICINE

## 2025-01-01 PROCEDURE — 05HC33Z INSERTION OF INFUSION DEVICE INTO LEFT BASILIC VEIN, PERCUTANEOUS APPROACH: ICD-10-PCS | Performed by: HOSPITALIST

## 2025-01-01 PROCEDURE — 700111 HCHG RX REV CODE 636 W/ 250 OVERRIDE (IP): Performed by: INTERNAL MEDICINE

## 2025-01-01 PROCEDURE — 700111 HCHG RX REV CODE 636 W/ 250 OVERRIDE (IP): Performed by: HOSPITALIST

## 2025-01-01 PROCEDURE — 97535 SELF CARE MNGMENT TRAINING: CPT

## 2025-01-01 PROCEDURE — 87086 URINE CULTURE/COLONY COUNT: CPT

## 2025-01-01 PROCEDURE — 37799 UNLISTED PX VASCULAR SURGERY: CPT

## 2025-01-01 PROCEDURE — 80048 BASIC METABOLIC PNL TOTAL CA: CPT

## 2025-01-01 PROCEDURE — 84100 ASSAY OF PHOSPHORUS: CPT

## 2025-01-01 PROCEDURE — 94003 VENT MGMT INPAT SUBQ DAY: CPT

## 2025-01-01 PROCEDURE — P9016 RBC LEUKOCYTES REDUCED: HCPCS

## 2025-01-01 PROCEDURE — A9270 NON-COVERED ITEM OR SERVICE: HCPCS | Performed by: INTERNAL MEDICINE

## 2025-01-01 PROCEDURE — 97602 WOUND(S) CARE NON-SELECTIVE: CPT

## 2025-01-01 PROCEDURE — 82330 ASSAY OF CALCIUM: CPT

## 2025-01-01 PROCEDURE — 85018 HEMOGLOBIN: CPT | Mod: 91

## 2025-01-01 PROCEDURE — A9270 NON-COVERED ITEM OR SERVICE: HCPCS | Performed by: NURSE PRACTITIONER

## 2025-01-01 PROCEDURE — 700105 HCHG RX REV CODE 258

## 2025-01-01 PROCEDURE — 82803 BLOOD GASES ANY COMBINATION: CPT

## 2025-01-01 PROCEDURE — 87186 SC STD MICRODIL/AGAR DIL: CPT | Mod: 91

## 2025-01-01 PROCEDURE — 83605 ASSAY OF LACTIC ACID: CPT | Mod: 91

## 2025-01-01 PROCEDURE — 95720 EEG PHY/QHP EA INCR W/VEEG: CPT | Performed by: STUDENT IN AN ORGANIZED HEALTH CARE EDUCATION/TRAINING PROGRAM

## 2025-01-01 PROCEDURE — 97530 THERAPEUTIC ACTIVITIES: CPT

## 2025-01-01 PROCEDURE — 99291 CRITICAL CARE FIRST HOUR: CPT | Mod: 25,GC | Performed by: INTERNAL MEDICINE

## 2025-01-01 PROCEDURE — 770022 HCHG ROOM/CARE - ICU (200)

## 2025-01-01 PROCEDURE — 700101 HCHG RX REV CODE 250

## 2025-01-01 PROCEDURE — 76775 US EXAM ABDO BACK WALL LIM: CPT

## 2025-01-01 PROCEDURE — 99292 CRITICAL CARE ADDL 30 MIN: CPT | Mod: 25 | Performed by: INTERNAL MEDICINE

## 2025-01-01 PROCEDURE — 82550 ASSAY OF CK (CPK): CPT

## 2025-01-01 PROCEDURE — 71045 X-RAY EXAM CHEST 1 VIEW: CPT

## 2025-01-01 PROCEDURE — 02HV33Z INSERTION OF INFUSION DEVICE INTO SUPERIOR VENA CAVA, PERCUTANEOUS APPROACH: ICD-10-PCS | Performed by: EMERGENCY MEDICINE

## 2025-01-01 PROCEDURE — 84145 PROCALCITONIN (PCT): CPT

## 2025-01-01 PROCEDURE — A9270 NON-COVERED ITEM OR SERVICE: HCPCS

## 2025-01-01 PROCEDURE — 85014 HEMATOCRIT: CPT | Mod: 91

## 2025-01-01 PROCEDURE — 93306 TTE W/DOPPLER COMPLETE: CPT | Mod: 26 | Performed by: INTERNAL MEDICINE

## 2025-01-01 PROCEDURE — 86900 BLOOD TYPING SEROLOGIC ABO: CPT

## 2025-01-01 PROCEDURE — 76700 US EXAM ABDOM COMPLETE: CPT

## 2025-01-01 PROCEDURE — 36415 COLL VENOUS BLD VENIPUNCTURE: CPT

## 2025-01-01 PROCEDURE — 83605 ASSAY OF LACTIC ACID: CPT | Performed by: INTERNAL MEDICINE

## 2025-01-01 PROCEDURE — 85384 FIBRINOGEN ACTIVITY: CPT

## 2025-01-01 PROCEDURE — 85007 BL SMEAR W/DIFF WBC COUNT: CPT

## 2025-01-01 PROCEDURE — 81001 URINALYSIS AUTO W/SCOPE: CPT

## 2025-01-01 PROCEDURE — 87641 MR-STAPH DNA AMP PROBE: CPT

## 2025-01-01 PROCEDURE — 5A1945Z RESPIRATORY VENTILATION, 24-96 CONSECUTIVE HOURS: ICD-10-PCS | Performed by: EMERGENCY MEDICINE

## 2025-01-01 PROCEDURE — 87205 SMEAR GRAM STAIN: CPT

## 2025-01-01 PROCEDURE — 85347 COAGULATION TIME ACTIVATED: CPT

## 2025-01-01 PROCEDURE — 86923 COMPATIBILITY TEST ELECTRIC: CPT

## 2025-01-01 PROCEDURE — 96375 TX/PRO/DX INJ NEW DRUG ADDON: CPT

## 2025-01-01 PROCEDURE — 99223 1ST HOSP IP/OBS HIGH 75: CPT | Performed by: HOSPITALIST

## 2025-01-01 PROCEDURE — 87147 CULTURE TYPE IMMUNOLOGIC: CPT

## 2025-01-01 PROCEDURE — 302307 BAG FECAL MANAGEMENT TEMPORARY COLLECTION WITH FILTER - SEAL SIGNAL FMS: Performed by: HOSPITALIST

## 2025-01-01 PROCEDURE — 96365 THER/PROPH/DIAG IV INF INIT: CPT

## 2025-01-01 PROCEDURE — 83605 ASSAY OF LACTIC ACID: CPT

## 2025-01-01 PROCEDURE — 94640 AIRWAY INHALATION TREATMENT: CPT

## 2025-01-01 PROCEDURE — 0B9F8ZX DRAINAGE OF RIGHT LOWER LUNG LOBE, VIA NATURAL OR ARTIFICIAL OPENING ENDOSCOPIC, DIAGNOSTIC: ICD-10-PCS | Performed by: INTERNAL MEDICINE

## 2025-01-01 PROCEDURE — 83735 ASSAY OF MAGNESIUM: CPT

## 2025-01-01 PROCEDURE — 700102 HCHG RX REV CODE 250 W/ 637 OVERRIDE(OP)

## 2025-01-01 PROCEDURE — 85025 COMPLETE CBC W/AUTO DIFF WBC: CPT

## 2025-01-01 PROCEDURE — 95700 EEG CONT REC W/VID EEG TECH: CPT | Performed by: STUDENT IN AN ORGANIZED HEALTH CARE EDUCATION/TRAINING PROGRAM

## 2025-01-01 PROCEDURE — 30233N1 TRANSFUSION OF NONAUTOLOGOUS RED BLOOD CELLS INTO PERIPHERAL VEIN, PERCUTANEOUS APPROACH: ICD-10-PCS | Performed by: INTERNAL MEDICINE

## 2025-01-01 PROCEDURE — 31500 INSERT EMERGENCY AIRWAY: CPT

## 2025-01-01 PROCEDURE — 95714 VEEG EA 12-26 HR UNMNTR: CPT | Performed by: STUDENT IN AN ORGANIZED HEALTH CARE EDUCATION/TRAINING PROGRAM

## 2025-01-01 PROCEDURE — 99291 CRITICAL CARE FIRST HOUR: CPT | Performed by: INTERNAL MEDICINE

## 2025-01-01 PROCEDURE — 700105 HCHG RX REV CODE 258: Mod: UD

## 2025-01-01 PROCEDURE — 86850 RBC ANTIBODY SCREEN: CPT

## 2025-01-01 PROCEDURE — 80048 BASIC METABOLIC PNL TOTAL CA: CPT | Mod: 91

## 2025-01-01 PROCEDURE — 4A00X4Z MEASUREMENT OF CENTRAL NERVOUS ELECTRICAL ACTIVITY, EXTERNAL APPROACH: ICD-10-PCS | Performed by: STUDENT IN AN ORGANIZED HEALTH CARE EDUCATION/TRAINING PROGRAM

## 2025-01-01 PROCEDURE — 99152 MOD SED SAME PHYS/QHP 5/>YRS: CPT

## 2025-01-01 PROCEDURE — 700111 HCHG RX REV CODE 636 W/ 250 OVERRIDE (IP)

## 2025-01-01 PROCEDURE — 95713 VEEG 2-12 HR CONT MNTR: CPT | Performed by: STUDENT IN AN ORGANIZED HEALTH CARE EDUCATION/TRAINING PROGRAM

## 2025-01-01 PROCEDURE — 82533 TOTAL CORTISOL: CPT

## 2025-01-01 PROCEDURE — 85730 THROMBOPLASTIN TIME PARTIAL: CPT

## 2025-01-01 PROCEDURE — 99497 ADVNCD CARE PLAN 30 MIN: CPT

## 2025-01-01 PROCEDURE — 700105 HCHG RX REV CODE 258: Performed by: EMERGENCY MEDICINE

## 2025-01-01 PROCEDURE — 700111 HCHG RX REV CODE 636 W/ 250 OVERRIDE (IP): Performed by: EMERGENCY MEDICINE

## 2025-01-01 PROCEDURE — 92610 EVALUATE SWALLOWING FUNCTION: CPT

## 2025-01-01 PROCEDURE — 96368 THER/DIAG CONCURRENT INF: CPT

## 2025-01-01 PROCEDURE — 84300 ASSAY OF URINE SODIUM: CPT

## 2025-01-01 PROCEDURE — 94150 VITAL CAPACITY TEST: CPT

## 2025-01-01 PROCEDURE — 84155 ASSAY OF PROTEIN SERUM: CPT

## 2025-01-01 PROCEDURE — 99497 ADVNCD CARE PLAN 30 MIN: CPT | Performed by: HOSPITALIST

## 2025-01-01 PROCEDURE — 83615 LACTATE (LD) (LDH) ENZYME: CPT

## 2025-01-01 PROCEDURE — 36620 INSERTION CATHETER ARTERY: CPT | Mod: GC | Performed by: STUDENT IN AN ORGANIZED HEALTH CARE EDUCATION/TRAINING PROGRAM

## 2025-01-01 PROCEDURE — 74177 CT ABD & PELVIS W/CONTRAST: CPT

## 2025-01-01 PROCEDURE — 0BH17EZ INSERTION OF ENDOTRACHEAL AIRWAY INTO TRACHEA, VIA NATURAL OR ARTIFICIAL OPENING: ICD-10-PCS | Performed by: EMERGENCY MEDICINE

## 2025-01-01 PROCEDURE — 87040 BLOOD CULTURE FOR BACTERIA: CPT

## 2025-01-01 PROCEDURE — 99233 SBSQ HOSP IP/OBS HIGH 50: CPT

## 2025-01-01 PROCEDURE — 302978 HCHG BRONCHOSCOPY-DIAGNOSTIC

## 2025-01-01 PROCEDURE — 700117 HCHG RX CONTRAST REV CODE 255: Performed by: EMERGENCY MEDICINE

## 2025-01-01 PROCEDURE — 700117 HCHG RX CONTRAST REV CODE 255: Performed by: INTERNAL MEDICINE

## 2025-01-01 PROCEDURE — 92526 ORAL FUNCTION THERAPY: CPT

## 2025-01-01 PROCEDURE — 99233 SBSQ HOSP IP/OBS HIGH 50: CPT | Mod: 25

## 2025-01-01 PROCEDURE — 99291 CRITICAL CARE FIRST HOUR: CPT

## 2025-01-01 PROCEDURE — 87077 CULTURE AEROBIC IDENTIFY: CPT | Mod: 91

## 2025-01-01 PROCEDURE — 700102 HCHG RX REV CODE 250 W/ 637 OVERRIDE(OP): Performed by: NURSE PRACTITIONER

## 2025-01-01 PROCEDURE — 87070 CULTURE OTHR SPECIMN AEROBIC: CPT

## 2025-01-01 PROCEDURE — C1751 CATH, INF, PER/CENT/MIDLINE: HCPCS

## 2025-01-01 PROCEDURE — 96366 THER/PROPH/DIAG IV INF ADDON: CPT

## 2025-01-01 PROCEDURE — 99223 1ST HOSP IP/OBS HIGH 75: CPT | Performed by: INTERNAL MEDICINE

## 2025-01-01 PROCEDURE — 700111 HCHG RX REV CODE 636 W/ 250 OVERRIDE (IP): Mod: JZ | Performed by: EMERGENCY MEDICINE

## 2025-01-01 PROCEDURE — 31645 BRNCHSC W/THER ASPIR 1ST: CPT | Performed by: INTERNAL MEDICINE

## 2025-01-01 PROCEDURE — 700101 HCHG RX REV CODE 250: Performed by: INTERNAL MEDICINE

## 2025-01-01 PROCEDURE — 87077 CULTURE AEROBIC IDENTIFY: CPT

## 2025-01-01 PROCEDURE — 770000 HCHG ROOM/CARE - INTERMEDIATE ICU *

## 2025-01-01 PROCEDURE — 97162 PT EVAL MOD COMPLEX 30 MIN: CPT

## 2025-01-01 PROCEDURE — 97166 OT EVAL MOD COMPLEX 45 MIN: CPT

## 2025-01-01 PROCEDURE — 03HY32Z INSERTION OF MONITORING DEVICE INTO UPPER ARTERY, PERCUTANEOUS APPROACH: ICD-10-PCS | Performed by: INTERNAL MEDICINE

## 2025-01-01 PROCEDURE — 99223 1ST HOSP IP/OBS HIGH 75: CPT

## 2025-01-01 RX ORDER — BISACODYL 10 MG
10 SUPPOSITORY, RECTAL RECTAL
Status: DISCONTINUED | OUTPATIENT
Start: 2025-01-01 | End: 2025-01-01

## 2025-01-01 RX ORDER — CARVEDILOL 3.12 MG/1
3.12 TABLET ORAL 2 TIMES DAILY WITH MEALS
Status: DISCONTINUED | OUTPATIENT
Start: 2025-01-01 | End: 2025-01-01

## 2025-01-01 RX ORDER — MAGNESIUM SULFATE HEPTAHYDRATE 40 MG/ML
2 INJECTION, SOLUTION INTRAVENOUS ONCE
Status: COMPLETED | OUTPATIENT
Start: 2025-01-01 | End: 2025-01-01

## 2025-01-01 RX ORDER — AMOXICILLIN 250 MG
2 CAPSULE ORAL 2 TIMES DAILY
Status: DISCONTINUED | OUTPATIENT
Start: 2025-01-01 | End: 2025-01-01

## 2025-01-01 RX ORDER — ROCURONIUM BROMIDE 10 MG/ML
100 INJECTION, SOLUTION INTRAVENOUS ONCE
Status: COMPLETED | OUTPATIENT
Start: 2025-01-01 | End: 2025-01-01

## 2025-01-01 RX ORDER — ALBUTEROL SULFATE AND BUDESONIDE 90; 80 UG/1; UG/1
1 AEROSOL, METERED RESPIRATORY (INHALATION) DAILY
COMMUNITY
Start: 2025-01-01

## 2025-01-01 RX ORDER — INDOMETHACIN 25 MG/1
100 CAPSULE ORAL ONCE
Status: COMPLETED | OUTPATIENT
Start: 2025-01-01 | End: 2025-01-01

## 2025-01-01 RX ORDER — OXYCODONE HYDROCHLORIDE 5 MG/1
5 TABLET ORAL
Refills: 0 | Status: DISCONTINUED | OUTPATIENT
Start: 2025-01-01 | End: 2025-08-23 | Stop reason: HOSPADM

## 2025-01-01 RX ORDER — DEXTROSE MONOHYDRATE 25 G/50ML
25 INJECTION, SOLUTION INTRAVENOUS
Status: DISCONTINUED | OUTPATIENT
Start: 2025-01-01 | End: 2025-01-01

## 2025-01-01 RX ORDER — MIDODRINE HYDROCHLORIDE 5 MG/1
10 TABLET ORAL EVERY 8 HOURS
Status: DISCONTINUED | OUTPATIENT
Start: 2025-01-01 | End: 2025-01-01

## 2025-01-01 RX ORDER — EPINEPHRINE 0.1 MG/ML
.05-.1 SYRINGE (ML) INJECTION
Status: ACTIVE | OUTPATIENT
Start: 2025-01-01 | End: 2025-01-01

## 2025-01-01 RX ORDER — SODIUM CHLORIDE 9 MG/ML
1000 INJECTION, SOLUTION INTRAVENOUS ONCE
Status: COMPLETED | OUTPATIENT
Start: 2025-01-01 | End: 2025-01-01

## 2025-01-01 RX ORDER — LACTULOSE 10 G/15ML
30 SOLUTION ORAL 3 TIMES DAILY
Status: DISCONTINUED | OUTPATIENT
Start: 2025-01-01 | End: 2025-01-01

## 2025-01-01 RX ORDER — ALBUMIN (HUMAN) 12.5 G/50ML
50 SOLUTION INTRAVENOUS ONCE
Status: COMPLETED | OUTPATIENT
Start: 2025-01-01 | End: 2025-01-01

## 2025-01-01 RX ORDER — POTASSIUM CHLORIDE 1500 MG/1
40 TABLET, EXTENDED RELEASE ORAL ONCE
Status: COMPLETED | OUTPATIENT
Start: 2025-01-01 | End: 2025-01-01

## 2025-01-01 RX ORDER — PROCHLORPERAZINE EDISYLATE 5 MG/ML
10 INJECTION INTRAMUSCULAR; INTRAVENOUS EVERY 6 HOURS PRN
Status: DISCONTINUED | OUTPATIENT
Start: 2025-01-01 | End: 2025-08-23 | Stop reason: HOSPADM

## 2025-01-01 RX ORDER — LINEZOLID 600 MG/1
600 TABLET, FILM COATED ORAL EVERY 12 HOURS
Status: DISCONTINUED | OUTPATIENT
Start: 2025-01-01 | End: 2025-01-01

## 2025-01-01 RX ORDER — PANTOPRAZOLE SODIUM 40 MG/10ML
40 INJECTION, POWDER, LYOPHILIZED, FOR SOLUTION INTRAVENOUS 2 TIMES DAILY
Status: DISCONTINUED | OUTPATIENT
Start: 2025-01-01 | End: 2025-01-01

## 2025-01-01 RX ORDER — SODIUM BICARBONATE IN D5W 150/1000ML
PLASTIC BAG, INJECTION (ML) INTRAVENOUS CONTINUOUS
Status: DISCONTINUED | OUTPATIENT
Start: 2025-01-01 | End: 2025-01-01

## 2025-01-01 RX ORDER — SODIUM CHLORIDE 9 MG/ML
500 INJECTION, SOLUTION INTRAVENOUS ONCE
Status: COMPLETED | OUTPATIENT
Start: 2025-01-01 | End: 2025-01-01

## 2025-01-01 RX ORDER — NAPROXEN SODIUM 220 MG/1
220 TABLET, FILM COATED ORAL 2 TIMES DAILY PRN
COMMUNITY

## 2025-01-01 RX ORDER — THIAMINE HYDROCHLORIDE 100 MG/ML
200 INJECTION, SOLUTION INTRAMUSCULAR; INTRAVENOUS DAILY
Status: COMPLETED | OUTPATIENT
Start: 2025-01-01 | End: 2025-01-01

## 2025-01-01 RX ORDER — INSULIN LISPRO 100 [IU]/ML
3-14 INJECTION, SOLUTION INTRAVENOUS; SUBCUTANEOUS
Status: DISCONTINUED | OUTPATIENT
Start: 2025-01-01 | End: 2025-01-01

## 2025-01-01 RX ORDER — SODIUM CHLORIDE, SODIUM LACTATE, POTASSIUM CHLORIDE, CALCIUM CHLORIDE 600; 310; 30; 20 MG/100ML; MG/100ML; MG/100ML; MG/100ML
1000 INJECTION, SOLUTION INTRAVENOUS ONCE
Status: DISCONTINUED | OUTPATIENT
Start: 2025-01-01 | End: 2025-01-01

## 2025-01-01 RX ORDER — SODIUM CHLORIDE, SODIUM LACTATE, POTASSIUM CHLORIDE, CALCIUM CHLORIDE 600; 310; 30; 20 MG/100ML; MG/100ML; MG/100ML; MG/100ML
INJECTION, SOLUTION INTRAVENOUS CONTINUOUS
Status: DISCONTINUED | OUTPATIENT
Start: 2025-01-01 | End: 2025-01-01

## 2025-01-01 RX ORDER — LACTULOSE 10 G/15ML
30 SOLUTION ORAL
Status: DISCONTINUED | OUTPATIENT
Start: 2025-01-01 | End: 2025-01-01

## 2025-01-01 RX ORDER — DEXMEDETOMIDINE HYDROCHLORIDE 4 UG/ML
.1-1.5 INJECTION, SOLUTION INTRAVENOUS CONTINUOUS
Status: DISCONTINUED | OUTPATIENT
Start: 2025-01-01 | End: 2025-01-01

## 2025-01-01 RX ORDER — AMLODIPINE BESYLATE 10 MG/1
5 TABLET ORAL
Status: DISCONTINUED | OUTPATIENT
Start: 2025-01-01 | End: 2025-01-01

## 2025-01-01 RX ORDER — DEXTROSE MONOHYDRATE 25 G/50ML
50 INJECTION, SOLUTION INTRAVENOUS ONCE
Status: COMPLETED | OUTPATIENT
Start: 2025-01-01 | End: 2025-01-01

## 2025-01-01 RX ORDER — LINEZOLID 2 MG/ML
600 INJECTION, SOLUTION INTRAVENOUS EVERY 12 HOURS
Status: DISCONTINUED | OUTPATIENT
Start: 2025-01-01 | End: 2025-01-01

## 2025-01-01 RX ORDER — HYDROCORTISONE SODIUM SUCCINATE 100 MG/2ML
25 INJECTION INTRAMUSCULAR; INTRAVENOUS EVERY 8 HOURS
Status: DISCONTINUED | OUTPATIENT
Start: 2025-01-01 | End: 2025-01-01

## 2025-01-01 RX ORDER — POTASSIUM CHLORIDE 1500 MG/1
20 TABLET, EXTENDED RELEASE ORAL ONCE
Status: COMPLETED | OUTPATIENT
Start: 2025-01-01 | End: 2025-01-01

## 2025-01-01 RX ORDER — DEXTROSE MONOHYDRATE AND SODIUM CHLORIDE 5; .9 G/100ML; G/100ML
INJECTION, SOLUTION INTRAVENOUS CONTINUOUS
Status: DISCONTINUED | OUTPATIENT
Start: 2025-01-01 | End: 2025-01-01

## 2025-01-01 RX ORDER — POTASSIUM CHLORIDE 1500 MG/1
40 TABLET, EXTENDED RELEASE ORAL EVERY 4 HOURS
Status: DISCONTINUED | OUTPATIENT
Start: 2025-01-01 | End: 2025-01-01

## 2025-01-01 RX ORDER — CARVEDILOL 3.12 MG/1
3.12 TABLET ORAL ONCE
Status: COMPLETED | OUTPATIENT
Start: 2025-01-01 | End: 2025-01-01

## 2025-01-01 RX ORDER — CALCIUM CHLORIDE 100 MG/ML
1 INJECTION INTRAVENOUS; INTRAVENTRICULAR ONCE
Status: COMPLETED | OUTPATIENT
Start: 2025-01-01 | End: 2025-01-01

## 2025-01-01 RX ORDER — ONDANSETRON 2 MG/ML
4 INJECTION INTRAMUSCULAR; INTRAVENOUS EVERY 4 HOURS PRN
Status: DISCONTINUED | OUTPATIENT
Start: 2025-01-01 | End: 2025-01-01

## 2025-01-01 RX ORDER — HYDROMORPHONE HYDROCHLORIDE 2 MG/ML
2 INJECTION, SOLUTION INTRAMUSCULAR; INTRAVENOUS; SUBCUTANEOUS
Status: DISCONTINUED | OUTPATIENT
Start: 2025-01-01 | End: 2025-08-23 | Stop reason: HOSPADM

## 2025-01-01 RX ORDER — HYDROMORPHONE HYDROCHLORIDE 1 MG/ML
0.5 INJECTION, SOLUTION INTRAMUSCULAR; INTRAVENOUS; SUBCUTANEOUS EVERY 4 HOURS PRN
Status: DISCONTINUED | OUTPATIENT
Start: 2025-01-01 | End: 2025-01-01

## 2025-01-01 RX ORDER — FUROSEMIDE 20 MG/1
20 TABLET ORAL
Status: DISCONTINUED | OUTPATIENT
Start: 2025-01-01 | End: 2025-01-01

## 2025-01-01 RX ORDER — MIDAZOLAM HYDROCHLORIDE 1 MG/ML
2 INJECTION INTRAMUSCULAR; INTRAVENOUS
Status: DISCONTINUED | OUTPATIENT
Start: 2025-01-01 | End: 2025-08-23 | Stop reason: HOSPADM

## 2025-01-01 RX ORDER — CALCIUM CHLORIDE 100 MG/ML
1 INJECTION INTRAVENOUS; INTRAVENTRICULAR ONCE
Status: DISCONTINUED | OUTPATIENT
Start: 2025-01-01 | End: 2025-01-01

## 2025-01-01 RX ORDER — EPINEPHRINE HCL IN 0.9 % NACL 4MG/250ML
0-.5 PLASTIC BAG, INJECTION (ML) INTRAVENOUS CONTINUOUS
Status: DISCONTINUED | OUTPATIENT
Start: 2025-01-01 | End: 2025-01-01

## 2025-01-01 RX ORDER — LIDOCAINE 50 MG/G
1 PATCH TOPICAL EVERY 24 HOURS
COMMUNITY

## 2025-01-01 RX ORDER — NOREPINEPHRINE BITARTRATE 0.03 MG/ML
0-1 INJECTION, SOLUTION INTRAVENOUS CONTINUOUS
Status: DISCONTINUED | OUTPATIENT
Start: 2025-01-01 | End: 2025-01-01

## 2025-01-01 RX ORDER — OXYCODONE HYDROCHLORIDE 10 MG/1
10 TABLET ORAL
Refills: 0 | Status: DISCONTINUED | OUTPATIENT
Start: 2025-01-01 | End: 2025-01-01

## 2025-01-01 RX ORDER — CALCIUM GLUCONATE 20 MG/ML
2 INJECTION, SOLUTION INTRAVENOUS ONCE
Status: COMPLETED | OUTPATIENT
Start: 2025-01-01 | End: 2025-01-01

## 2025-01-01 RX ORDER — NOREPINEPHRINE BITARTRATE 0.03 MG/ML
INJECTION, SOLUTION INTRAVENOUS
Status: ACTIVE
Start: 2025-01-01 | End: 2025-01-01

## 2025-01-01 RX ORDER — CARVEDILOL 6.25 MG/1
6.25 TABLET ORAL 2 TIMES DAILY WITH MEALS
Status: DISCONTINUED | OUTPATIENT
Start: 2025-01-01 | End: 2025-01-01

## 2025-01-01 RX ORDER — POLYETHYLENE GLYCOL 3350 17 G/17G
1 POWDER, FOR SOLUTION ORAL
Status: DISCONTINUED | OUTPATIENT
Start: 2025-01-01 | End: 2025-01-01

## 2025-01-01 RX ORDER — ONDANSETRON 4 MG/1
8 TABLET, ORALLY DISINTEGRATING ORAL EVERY 8 HOURS PRN
Status: DISCONTINUED | OUTPATIENT
Start: 2025-01-01 | End: 2025-08-23 | Stop reason: HOSPADM

## 2025-01-01 RX ORDER — IPRATROPIUM BROMIDE AND ALBUTEROL SULFATE 2.5; .5 MG/3ML; MG/3ML
3 SOLUTION RESPIRATORY (INHALATION)
Status: DISCONTINUED | OUTPATIENT
Start: 2025-01-01 | End: 2025-08-23 | Stop reason: HOSPADM

## 2025-01-01 RX ORDER — CALCIUM GLUCONATE 20 MG/ML
1 INJECTION, SOLUTION INTRAVENOUS ONCE
Status: COMPLETED | OUTPATIENT
Start: 2025-01-01 | End: 2025-01-01

## 2025-01-01 RX ORDER — FUROSEMIDE 10 MG/ML
40 INJECTION INTRAMUSCULAR; INTRAVENOUS ONCE
Status: COMPLETED | OUTPATIENT
Start: 2025-01-01 | End: 2025-01-01

## 2025-01-01 RX ORDER — HYDROMORPHONE HYDROCHLORIDE 1 MG/ML
0.5 INJECTION, SOLUTION INTRAMUSCULAR; INTRAVENOUS; SUBCUTANEOUS
Status: DISCONTINUED | OUTPATIENT
Start: 2025-01-01 | End: 2025-01-01

## 2025-01-01 RX ORDER — IPRATROPIUM BROMIDE AND ALBUTEROL SULFATE 2.5; .5 MG/3ML; MG/3ML
3 SOLUTION RESPIRATORY (INHALATION)
Status: DISCONTINUED | OUTPATIENT
Start: 2025-01-01 | End: 2025-01-01

## 2025-01-01 RX ORDER — NYSTATIN 100000 [USP'U]/G
POWDER TOPICAL 3 TIMES DAILY
Status: DISCONTINUED | OUTPATIENT
Start: 2025-01-01 | End: 2025-01-01

## 2025-01-01 RX ORDER — INSULIN LISPRO 100 [IU]/ML
2-9 INJECTION, SOLUTION INTRAVENOUS; SUBCUTANEOUS
Status: DISCONTINUED | OUTPATIENT
Start: 2025-01-01 | End: 2025-01-01

## 2025-01-01 RX ORDER — HYDROMORPHONE HYDROCHLORIDE 2 MG/ML
4 INJECTION, SOLUTION INTRAMUSCULAR; INTRAVENOUS; SUBCUTANEOUS
Status: DISCONTINUED | OUTPATIENT
Start: 2025-01-01 | End: 2025-08-23 | Stop reason: HOSPADM

## 2025-01-01 RX ORDER — OXYCODONE HYDROCHLORIDE 5 MG/1
5 TABLET ORAL
Refills: 0 | Status: DISCONTINUED | OUTPATIENT
Start: 2025-01-01 | End: 2025-01-01

## 2025-01-01 RX ORDER — HEPARIN SODIUM 5000 [USP'U]/ML
5000 INJECTION, SOLUTION INTRAVENOUS; SUBCUTANEOUS EVERY 8 HOURS
Status: DISCONTINUED | OUTPATIENT
Start: 2025-01-01 | End: 2025-01-01

## 2025-01-01 RX ORDER — SODIUM CHLORIDE, SODIUM LACTATE, POTASSIUM CHLORIDE, AND CALCIUM CHLORIDE .6; .31; .03; .02 G/100ML; G/100ML; G/100ML; G/100ML
1000 INJECTION, SOLUTION INTRAVENOUS ONCE
Status: DISCONTINUED | OUTPATIENT
Start: 2025-01-01 | End: 2025-01-01

## 2025-01-01 RX ORDER — HYDROCORTISONE SODIUM SUCCINATE 100 MG/2ML
50 INJECTION INTRAMUSCULAR; INTRAVENOUS EVERY 8 HOURS
Status: DISCONTINUED | OUTPATIENT
Start: 2025-01-01 | End: 2025-01-01

## 2025-01-01 RX ORDER — LACTULOSE 10 G/15ML
15 SOLUTION ORAL 3 TIMES DAILY
Status: DISCONTINUED | OUTPATIENT
Start: 2025-01-01 | End: 2025-01-01

## 2025-01-01 RX ORDER — FLUCONAZOLE 100 MG/1
200 TABLET ORAL DAILY
Status: DISCONTINUED | OUTPATIENT
Start: 2025-01-01 | End: 2025-01-01

## 2025-01-01 RX ORDER — ETOMIDATE 2 MG/ML
20 INJECTION INTRAVENOUS ONCE
Status: COMPLETED | OUTPATIENT
Start: 2025-01-01 | End: 2025-01-01

## 2025-01-01 RX ORDER — FAMOTIDINE 20 MG/1
20 TABLET, FILM COATED ORAL EVERY EVENING
Status: DISCONTINUED | OUTPATIENT
Start: 2025-01-01 | End: 2025-01-01

## 2025-01-01 RX ORDER — POTASSIUM CHLORIDE 7.45 MG/ML
10 INJECTION INTRAVENOUS
Status: COMPLETED | OUTPATIENT
Start: 2025-01-01 | End: 2025-01-01

## 2025-01-01 RX ORDER — DOXYCYCLINE 100 MG/1
100 TABLET ORAL EVERY 12 HOURS
Status: DISCONTINUED | OUTPATIENT
Start: 2025-01-01 | End: 2025-01-01 | Stop reason: HOSPADM

## 2025-01-01 RX ORDER — INSULIN LISPRO 100 [IU]/ML
1-6 INJECTION, SOLUTION INTRAVENOUS; SUBCUTANEOUS EVERY 6 HOURS
Status: DISCONTINUED | OUTPATIENT
Start: 2025-01-01 | End: 2025-01-01

## 2025-01-01 RX ORDER — ATROPINE SULFATE 10 MG/ML
2 SOLUTION/ DROPS OPHTHALMIC EVERY 4 HOURS PRN
Status: DISCONTINUED | OUTPATIENT
Start: 2025-01-01 | End: 2025-08-23 | Stop reason: HOSPADM

## 2025-01-01 RX ORDER — PHENYLEPHRINE HCL IN 0.9% NACL 1 MG/10 ML
0-300 SYRINGE (ML) INTRAVENOUS
Status: DISCONTINUED | OUTPATIENT
Start: 2025-01-01 | End: 2025-01-01

## 2025-01-01 RX ORDER — ONDANSETRON 2 MG/ML
8 INJECTION INTRAMUSCULAR; INTRAVENOUS EVERY 8 HOURS PRN
Status: DISCONTINUED | OUTPATIENT
Start: 2025-01-01 | End: 2025-08-23 | Stop reason: HOSPADM

## 2025-01-01 RX ORDER — SODIUM CHLORIDE, SODIUM LACTATE, POTASSIUM CHLORIDE, AND CALCIUM CHLORIDE .6; .31; .03; .02 G/100ML; G/100ML; G/100ML; G/100ML
1000 INJECTION, SOLUTION INTRAVENOUS ONCE
Status: COMPLETED | OUTPATIENT
Start: 2025-01-01 | End: 2025-01-01

## 2025-01-01 RX ORDER — OMEPRAZOLE 20 MG/1
20 CAPSULE, DELAYED RELEASE ORAL
COMMUNITY
Start: 2025-01-01 | End: 2026-01-22

## 2025-01-01 RX ORDER — OXYCODONE HYDROCHLORIDE 5 MG/1
5 TABLET ORAL EVERY 6 HOURS PRN
COMMUNITY
Start: 2025-01-01

## 2025-01-01 RX ORDER — HYDROCORTISONE SODIUM SUCCINATE 100 MG/2ML
100 INJECTION INTRAMUSCULAR; INTRAVENOUS EVERY 8 HOURS
Status: DISCONTINUED | OUTPATIENT
Start: 2025-01-01 | End: 2025-01-01

## 2025-01-01 RX ORDER — LABETALOL HYDROCHLORIDE 5 MG/ML
10 INJECTION, SOLUTION INTRAVENOUS EVERY 4 HOURS PRN
Status: DISCONTINUED | OUTPATIENT
Start: 2025-01-01 | End: 2025-01-01

## 2025-01-01 RX ORDER — FUROSEMIDE 20 MG/1
20 TABLET ORAL ONCE
Status: DISCONTINUED | OUTPATIENT
Start: 2025-01-01 | End: 2025-01-01

## 2025-01-01 RX ORDER — SODIUM CHLORIDE 9 MG/ML
INJECTION, SOLUTION INTRAVENOUS CONTINUOUS
Status: ACTIVE | OUTPATIENT
Start: 2025-01-01 | End: 2025-01-01

## 2025-01-01 RX ORDER — INDOMETHACIN 25 MG/1
50 CAPSULE ORAL ONCE
Status: COMPLETED | OUTPATIENT
Start: 2025-01-01 | End: 2025-01-01

## 2025-01-01 RX ORDER — INSULIN LISPRO 100 [IU]/ML
3-14 INJECTION, SOLUTION INTRAVENOUS; SUBCUTANEOUS EVERY 6 HOURS
Status: DISCONTINUED | OUTPATIENT
Start: 2025-01-01 | End: 2025-01-01

## 2025-01-01 RX ORDER — SPIRONOLACTONE 25 MG/1
50 TABLET ORAL
Status: DISCONTINUED | OUTPATIENT
Start: 2025-01-01 | End: 2025-01-01

## 2025-01-01 RX ORDER — CLINDAMYCIN PHOSPHATE 900 MG/50ML
900 INJECTION, SOLUTION INTRAVENOUS EVERY 8 HOURS
Status: DISCONTINUED | OUTPATIENT
Start: 2025-01-01 | End: 2025-01-01

## 2025-01-01 RX ADMIN — SODIUM CHLORIDE 1000 ML: 9 INJECTION, SOLUTION INTRAVENOUS at 18:49

## 2025-01-01 RX ADMIN — DAPTOMYCIN 650 MG: 500 INJECTION, POWDER, LYOPHILIZED, FOR SOLUTION INTRAVENOUS at 09:56

## 2025-01-01 RX ADMIN — POTASSIUM CHLORIDE 40 MEQ: 1500 TABLET, EXTENDED RELEASE ORAL at 05:23

## 2025-01-01 RX ADMIN — INSULIN GLARGINE-YFGN 7 UNITS: 100 INJECTION, SOLUTION SUBCUTANEOUS at 18:23

## 2025-01-01 RX ADMIN — POTASSIUM CHLORIDE 10 MEQ: 7.46 INJECTION, SOLUTION INTRAVENOUS at 16:23

## 2025-01-01 RX ADMIN — HEPARIN SODIUM 5000 UNITS: 5000 INJECTION, SOLUTION INTRAVENOUS; SUBCUTANEOUS at 20:28

## 2025-01-01 RX ADMIN — CARVEDILOL 3.12 MG: 3.12 TABLET, FILM COATED ORAL at 16:56

## 2025-01-01 RX ADMIN — DAPTOMYCIN 650 MG: 500 INJECTION, POWDER, LYOPHILIZED, FOR SOLUTION INTRAVENOUS at 04:56

## 2025-01-01 RX ADMIN — SODIUM CHLORIDE 1000 ML: 9 INJECTION, SOLUTION INTRAVENOUS at 18:50

## 2025-01-01 RX ADMIN — HYDROCORTISONE SODIUM SUCCINATE 50 MG: 100 INJECTION, POWDER, FOR SOLUTION INTRAMUSCULAR; INTRAVENOUS at 14:18

## 2025-01-01 RX ADMIN — MAGNESIUM SULFATE HEPTAHYDRATE 2 G: 2 INJECTION, SOLUTION INTRAVENOUS at 09:26

## 2025-01-01 RX ADMIN — CALCIUM GLUCONATE 2 G: 20 INJECTION, SOLUTION INTRAVENOUS at 23:42

## 2025-01-01 RX ADMIN — CALCIUM GLUCONATE 2 G: 20 INJECTION, SOLUTION INTRAVENOUS at 12:38

## 2025-01-01 RX ADMIN — FAMOTIDINE 20 MG: 20 TABLET, FILM COATED ORAL at 18:01

## 2025-01-01 RX ADMIN — POTASSIUM CHLORIDE 10 MEQ: 7.46 INJECTION, SOLUTION INTRAVENOUS at 08:49

## 2025-01-01 RX ADMIN — SENNOSIDES, DOCUSATE SODIUM 2 TABLET: 50; 8.6 TABLET, FILM COATED ORAL at 05:21

## 2025-01-01 RX ADMIN — HEPARIN SODIUM 5000 UNITS: 5000 INJECTION, SOLUTION INTRAVENOUS; SUBCUTANEOUS at 13:49

## 2025-01-01 RX ADMIN — LACTULOSE 30 ML: 10 SOLUTION ORAL; RECTAL at 05:00

## 2025-01-01 RX ADMIN — MIDODRINE HYDROCHLORIDE 10 MG: 5 TABLET ORAL at 07:28

## 2025-01-01 RX ADMIN — CARVEDILOL 6.25 MG: 6.25 TABLET, FILM COATED ORAL at 17:13

## 2025-01-01 RX ADMIN — DOXYCYCLINE 100 MG: 100 TABLET, FILM COATED ORAL at 05:30

## 2025-01-01 RX ADMIN — RIFAXIMIN 550 MG: 550 TABLET ORAL at 17:15

## 2025-01-01 RX ADMIN — AMIODARONE HYDROCHLORIDE 0.5 MG/MIN: 1.8 INJECTION, SOLUTION INTRAVENOUS at 01:46

## 2025-01-01 RX ADMIN — VANCOMYCIN HYDROCHLORIDE 2000 MG: 5 INJECTION, POWDER, LYOPHILIZED, FOR SOLUTION INTRAVENOUS at 07:55

## 2025-01-01 RX ADMIN — DOXYCYCLINE 100 MG: 100 TABLET, FILM COATED ORAL at 05:42

## 2025-01-01 RX ADMIN — SODIUM BICARBONATE INJECTION,: 84 SOLUTION INTRAVENOUS at 21:37

## 2025-01-01 RX ADMIN — LACTULOSE 30 ML: 10 SOLUTION ORAL; RECTAL at 11:45

## 2025-01-01 RX ADMIN — PIPERACILLIN AND TAZOBACTAM 3.38 G: 3; .375 INJECTION, POWDER, FOR SOLUTION INTRAVENOUS at 06:31

## 2025-01-01 RX ADMIN — NYSTATIN: 100000 POWDER TOPICAL at 05:31

## 2025-01-01 RX ADMIN — DOXYCYCLINE 100 MG: 100 TABLET, FILM COATED ORAL at 16:56

## 2025-01-01 RX ADMIN — HEPARIN SODIUM 5000 UNITS: 5000 INJECTION, SOLUTION INTRAVENOUS; SUBCUTANEOUS at 14:22

## 2025-01-01 RX ADMIN — AMIODARONE HYDROCHLORIDE 0.5 MG/MIN: 1.8 INJECTION, SOLUTION INTRAVENOUS at 12:45

## 2025-01-01 RX ADMIN — LACTULOSE 30 ML: 10 SOLUTION ORAL; RECTAL at 13:14

## 2025-01-01 RX ADMIN — SPIRONOLACTONE 50 MG: 25 TABLET ORAL at 05:30

## 2025-01-01 RX ADMIN — NOREPINEPHRINE BITARTRATE 1 MCG/KG/MIN: 1 INJECTION, SOLUTION, CONCENTRATE INTRAVENOUS at 08:18

## 2025-01-01 RX ADMIN — HEPARIN SODIUM 5000 UNITS: 5000 INJECTION, SOLUTION INTRAVENOUS; SUBCUTANEOUS at 13:51

## 2025-01-01 RX ADMIN — THIAMINE HYDROCHLORIDE 200 MG: 100 INJECTION, SOLUTION INTRAMUSCULAR; INTRAVENOUS at 06:20

## 2025-01-01 RX ADMIN — INSULIN GLARGINE-YFGN 7 UNITS: 100 INJECTION, SOLUTION SUBCUTANEOUS at 20:43

## 2025-01-01 RX ADMIN — OXYCODONE HYDROCHLORIDE 5 MG: 5 TABLET ORAL at 17:49

## 2025-01-01 RX ADMIN — HYDROMORPHONE HYDROCHLORIDE 0.5 MG: 1 INJECTION, SOLUTION INTRAMUSCULAR; INTRAVENOUS; SUBCUTANEOUS at 11:41

## 2025-01-01 RX ADMIN — SENNOSIDES, DOCUSATE SODIUM 2 TABLET: 50; 8.6 TABLET, FILM COATED ORAL at 18:36

## 2025-01-01 RX ADMIN — CALCIUM CHLORIDE 1000 MG: 100 INJECTION, SOLUTION INTRAVENOUS at 06:15

## 2025-01-01 RX ADMIN — SODIUM BICARBONATE INJECTION,: 84 SOLUTION INTRAVENOUS at 06:44

## 2025-01-01 RX ADMIN — SODIUM BICARBONATE INJECTION,: 84 SOLUTION INTRAVENOUS at 21:52

## 2025-01-01 RX ADMIN — RIFAXIMIN 550 MG: 550 TABLET ORAL at 05:40

## 2025-01-01 RX ADMIN — INSULIN LISPRO 2 UNITS: 100 INJECTION, SOLUTION INTRAVENOUS; SUBCUTANEOUS at 20:44

## 2025-01-01 RX ADMIN — PIPERACILLIN AND TAZOBACTAM 3.38 G: 3; .375 INJECTION, POWDER, FOR SOLUTION INTRAVENOUS at 22:56

## 2025-01-01 RX ADMIN — INSULIN GLARGINE-YFGN 10 UNITS: 100 INJECTION, SOLUTION SUBCUTANEOUS at 05:38

## 2025-01-01 RX ADMIN — POTASSIUM CHLORIDE 10 MEQ: 7.46 INJECTION, SOLUTION INTRAVENOUS at 11:07

## 2025-01-01 RX ADMIN — AMIODARONE HYDROCHLORIDE 0.5 MG/MIN: 1.8 INJECTION, SOLUTION INTRAVENOUS at 14:15

## 2025-01-01 RX ADMIN — SODIUM BICARBONATE 100 MEQ: 84 INJECTION INTRAVENOUS at 20:50

## 2025-01-01 RX ADMIN — SODIUM CHLORIDE 500 ML: 9 INJECTION, SOLUTION INTRAVENOUS at 21:30

## 2025-01-01 RX ADMIN — PIPERACILLIN AND TAZOBACTAM 4.5 G: 4; .5 INJECTION, POWDER, FOR SOLUTION INTRAVENOUS at 09:23

## 2025-01-01 RX ADMIN — CARVEDILOL 6.25 MG: 6.25 TABLET, FILM COATED ORAL at 08:29

## 2025-01-01 RX ADMIN — DAPTOMYCIN 650 MG: 500 INJECTION, POWDER, LYOPHILIZED, FOR SOLUTION INTRAVENOUS at 09:52

## 2025-01-01 RX ADMIN — INSULIN LISPRO 4 UNITS: 100 INJECTION, SOLUTION INTRAVENOUS; SUBCUTANEOUS at 17:55

## 2025-01-01 RX ADMIN — NYSTATIN: 100000 POWDER TOPICAL at 17:13

## 2025-01-01 RX ADMIN — LACTULOSE 30 ML: 10 SOLUTION ORAL; RECTAL at 11:08

## 2025-01-01 RX ADMIN — HUMAN ALBUMIN MICROSPHERES AND PERFLUTREN 3 ML: 10; .22 INJECTION, SOLUTION INTRAVENOUS at 15:15

## 2025-01-01 RX ADMIN — CALCIUM CHLORIDE 1 G: 100 INJECTION, SOLUTION INTRAVENOUS at 20:51

## 2025-01-01 RX ADMIN — NOREPINEPHRINE BITARTRATE 0.3 MCG/KG/MIN: 0.03 INJECTION, SOLUTION INTRAVENOUS at 06:35

## 2025-01-01 RX ADMIN — IOHEXOL 100 ML: 350 INJECTION, SOLUTION INTRAVENOUS at 22:30

## 2025-01-01 RX ADMIN — LACTULOSE 15 ML: 10 SOLUTION ORAL at 23:05

## 2025-01-01 RX ADMIN — RIFAXIMIN 550 MG: 550 TABLET ORAL at 16:56

## 2025-01-01 RX ADMIN — LACTULOSE 15 ML: 10 SOLUTION ORAL at 12:39

## 2025-01-01 RX ADMIN — CARVEDILOL 3.12 MG: 3.12 TABLET, FILM COATED ORAL at 08:56

## 2025-01-01 RX ADMIN — DAPTOMYCIN 650 MG: 500 INJECTION, POWDER, LYOPHILIZED, FOR SOLUTION INTRAVENOUS at 05:47

## 2025-01-01 RX ADMIN — VASOPRESSIN 0.03 UNITS/MIN: 20 INJECTION INTRAVENOUS at 05:42

## 2025-01-01 RX ADMIN — FUROSEMIDE 40 MG: 10 INJECTION, SOLUTION INTRAVENOUS at 09:49

## 2025-01-01 RX ADMIN — INSULIN GLARGINE-YFGN 7 UNITS: 100 INJECTION, SOLUTION SUBCUTANEOUS at 17:16

## 2025-01-01 RX ADMIN — LINEZOLID 600 MG: 600 INJECTION, SOLUTION INTRAVENOUS at 17:19

## 2025-01-01 RX ADMIN — LACTULOSE 30 ML: 10 SOLUTION ORAL; RECTAL at 18:00

## 2025-01-01 RX ADMIN — FAMOTIDINE 20 MG: 10 INJECTION, SOLUTION INTRAVENOUS at 20:58

## 2025-01-01 RX ADMIN — PIPERACILLIN AND TAZOBACTAM 3.38 G: 3; .375 INJECTION, POWDER, FOR SOLUTION INTRAVENOUS at 21:03

## 2025-01-01 RX ADMIN — FUROSEMIDE 20 MG: 20 TABLET ORAL at 05:03

## 2025-01-01 RX ADMIN — CALCIUM CHLORIDE 1000 MG: 100 INJECTION, SOLUTION INTRAVENOUS at 23:08

## 2025-01-01 RX ADMIN — NYSTATIN: 100000 POWDER TOPICAL at 12:00

## 2025-01-01 RX ADMIN — LACTULOSE 30 ML: 10 SOLUTION ORAL; RECTAL at 06:00

## 2025-01-01 RX ADMIN — RIFAXIMIN 550 MG: 550 TABLET ORAL at 05:54

## 2025-01-01 RX ADMIN — HYDROCORTISONE SODIUM SUCCINATE 100 MG: 100 INJECTION, POWDER, FOR SOLUTION INTRAMUSCULAR; INTRAVENOUS at 23:33

## 2025-01-01 RX ADMIN — INSULIN LISPRO 4 UNITS: 100 INJECTION, SOLUTION INTRAVENOUS; SUBCUTANEOUS at 05:57

## 2025-01-01 RX ADMIN — SODIUM BICARBONATE INJECTION,: 84 SOLUTION INTRAVENOUS at 10:33

## 2025-01-01 RX ADMIN — INSULIN LISPRO 3 UNITS: 100 INJECTION, SOLUTION INTRAVENOUS; SUBCUTANEOUS at 18:04

## 2025-01-01 RX ADMIN — HEPARIN SODIUM 5000 UNITS: 5000 INJECTION, SOLUTION INTRAVENOUS; SUBCUTANEOUS at 05:39

## 2025-01-01 RX ADMIN — RIFAXIMIN 550 MG: 550 TABLET ORAL at 18:00

## 2025-01-01 RX ADMIN — RIFAXIMIN 550 MG: 550 TABLET ORAL at 18:08

## 2025-01-01 RX ADMIN — SPIRONOLACTONE 50 MG: 25 TABLET ORAL at 20:36

## 2025-01-01 RX ADMIN — SENNOSIDES, DOCUSATE SODIUM 2 TABLET: 50; 8.6 TABLET, FILM COATED ORAL at 05:03

## 2025-01-01 RX ADMIN — LINEZOLID 600 MG: 600 INJECTION, SOLUTION INTRAVENOUS at 18:00

## 2025-01-01 RX ADMIN — SPIRONOLACTONE 50 MG: 25 TABLET ORAL at 05:03

## 2025-01-01 RX ADMIN — RIFAXIMIN 550 MG: 550 TABLET ORAL at 06:17

## 2025-01-01 RX ADMIN — SENNOSIDES, DOCUSATE SODIUM 2 TABLET: 50; 8.6 TABLET, FILM COATED ORAL at 18:01

## 2025-01-01 RX ADMIN — RIFAXIMIN 550 MG: 550 TABLET ORAL at 05:04

## 2025-01-01 RX ADMIN — ETOMIDATE 20 MG: 2 INJECTION, SOLUTION INTRAVENOUS at 18:33

## 2025-01-01 RX ADMIN — AMIODARONE HYDROCHLORIDE 0.5 MG/MIN: 1.8 INJECTION, SOLUTION INTRAVENOUS at 02:07

## 2025-01-01 RX ADMIN — SODIUM BICARBONATE INJECTION,: 84 SOLUTION INTRAVENOUS at 22:45

## 2025-01-01 RX ADMIN — RIFAXIMIN 550 MG: 550 TABLET ORAL at 18:36

## 2025-01-01 RX ADMIN — LACTULOSE 15 ML: 10 SOLUTION ORAL at 05:05

## 2025-01-01 RX ADMIN — LACTULOSE 30 ML: 10 SOLUTION ORAL at 07:48

## 2025-01-01 RX ADMIN — LACTULOSE 15 ML: 10 SOLUTION ORAL at 17:15

## 2025-01-01 RX ADMIN — Medication 100 MCG: at 18:49

## 2025-01-01 RX ADMIN — POTASSIUM CHLORIDE 10 MEQ: 7.46 INJECTION, SOLUTION INTRAVENOUS at 12:39

## 2025-01-01 RX ADMIN — HYDROCORTISONE SODIUM SUCCINATE 25 MG: 100 INJECTION, POWDER, FOR SOLUTION INTRAMUSCULAR; INTRAVENOUS at 21:19

## 2025-01-01 RX ADMIN — LINEZOLID 600 MG: 600 INJECTION, SOLUTION INTRAVENOUS at 12:42

## 2025-01-01 RX ADMIN — RIFAXIMIN 550 MG: 550 TABLET ORAL at 17:10

## 2025-01-01 RX ADMIN — AMPICILLIN AND SULBACTAM 3 G: 1; 2 INJECTION, POWDER, FOR SOLUTION INTRAMUSCULAR; INTRAVENOUS at 19:58

## 2025-01-01 RX ADMIN — CLINDAMYCIN IN 5 PERCENT DEXTROSE 900 MG: 18 INJECTION, SOLUTION INTRAVENOUS at 08:01

## 2025-01-01 RX ADMIN — DOXYCYCLINE 100 MG: 100 TABLET, FILM COATED ORAL at 05:03

## 2025-01-01 RX ADMIN — OXYCODONE HYDROCHLORIDE 5 MG: 5 TABLET ORAL at 22:56

## 2025-01-01 RX ADMIN — DOXYCYCLINE 100 MG: 100 TABLET, FILM COATED ORAL at 17:10

## 2025-01-01 RX ADMIN — LACTULOSE 30 ML: 10 SOLUTION ORAL at 08:29

## 2025-01-01 RX ADMIN — DOXYCYCLINE 100 MG: 100 TABLET, FILM COATED ORAL at 05:00

## 2025-01-01 RX ADMIN — PIPERACILLIN AND TAZOBACTAM 4.5 G: 4; .5 INJECTION, POWDER, FOR SOLUTION INTRAVENOUS at 05:50

## 2025-01-01 RX ADMIN — SODIUM BICARBONATE 100 MEQ: 84 INJECTION INTRAVENOUS at 09:12

## 2025-01-01 RX ADMIN — LINEZOLID 600 MG: 600 INJECTION, SOLUTION INTRAVENOUS at 05:38

## 2025-01-01 RX ADMIN — INSULIN LISPRO 3 UNITS: 100 INJECTION, SOLUTION INTRAVENOUS; SUBCUTANEOUS at 11:47

## 2025-01-01 RX ADMIN — CARVEDILOL 3.12 MG: 3.12 TABLET, FILM COATED ORAL at 18:13

## 2025-01-01 RX ADMIN — RIFAXIMIN 550 MG: 550 TABLET ORAL at 05:30

## 2025-01-01 RX ADMIN — DAPTOMYCIN 650 MG: 500 INJECTION, POWDER, LYOPHILIZED, FOR SOLUTION INTRAVENOUS at 05:55

## 2025-01-01 RX ADMIN — FUROSEMIDE 20 MG: 20 TABLET ORAL at 20:35

## 2025-01-01 RX ADMIN — HEPARIN SODIUM 5000 UNITS: 5000 INJECTION, SOLUTION INTRAVENOUS; SUBCUTANEOUS at 21:19

## 2025-01-01 RX ADMIN — MAGNESIUM SULFATE HEPTAHYDRATE 2 G: 2 INJECTION, SOLUTION INTRAVENOUS at 11:10

## 2025-01-01 RX ADMIN — NOREPINEPHRINE BITARTRATE 0.95 MCG/KG/MIN: 1 INJECTION, SOLUTION, CONCENTRATE INTRAVENOUS at 23:22

## 2025-01-01 RX ADMIN — LACTULOSE 30 ML: 10 SOLUTION ORAL; RECTAL at 13:41

## 2025-01-01 RX ADMIN — PIPERACILLIN AND TAZOBACTAM 3.38 G: 3; .375 INJECTION, POWDER, FOR SOLUTION INTRAVENOUS at 05:33

## 2025-01-01 RX ADMIN — RIFAXIMIN 550 MG: 550 TABLET ORAL at 17:12

## 2025-01-01 RX ADMIN — LINEZOLID 600 MG: 600 INJECTION, SOLUTION INTRAVENOUS at 05:40

## 2025-01-01 RX ADMIN — INSULIN LISPRO 3 UNITS: 100 INJECTION, SOLUTION INTRAVENOUS; SUBCUTANEOUS at 05:29

## 2025-01-01 RX ADMIN — OXYCODONE HYDROCHLORIDE 5 MG: 5 TABLET ORAL at 12:23

## 2025-01-01 RX ADMIN — LACTULOSE 15 ML: 10 SOLUTION ORAL at 18:00

## 2025-01-01 RX ADMIN — HYDROCORTISONE SODIUM SUCCINATE 25 MG: 100 INJECTION, POWDER, FOR SOLUTION INTRAMUSCULAR; INTRAVENOUS at 05:50

## 2025-01-01 RX ADMIN — OXYCODONE HYDROCHLORIDE 5 MG: 5 TABLET ORAL at 05:40

## 2025-01-01 RX ADMIN — HYDROCORTISONE SODIUM SUCCINATE 100 MG: 100 INJECTION, POWDER, FOR SOLUTION INTRAMUSCULAR; INTRAVENOUS at 21:32

## 2025-01-01 RX ADMIN — NYSTATIN: 100000 POWDER TOPICAL at 06:00

## 2025-01-01 RX ADMIN — AMIODARONE HYDROCHLORIDE 1 MG/MIN: 1.8 INJECTION, SOLUTION INTRAVENOUS at 02:07

## 2025-01-01 RX ADMIN — NYSTATIN: 100000 POWDER TOPICAL at 17:19

## 2025-01-01 RX ADMIN — SODIUM CHLORIDE 1000 ML: 9 INJECTION, SOLUTION INTRAVENOUS at 22:15

## 2025-01-01 RX ADMIN — INSULIN LISPRO 2 UNITS: 100 INJECTION, SOLUTION INTRAVENOUS; SUBCUTANEOUS at 17:55

## 2025-01-01 RX ADMIN — DOXYCYCLINE 100 MG: 100 TABLET, FILM COATED ORAL at 04:56

## 2025-01-01 RX ADMIN — POTASSIUM CHLORIDE: 2 INJECTION, SOLUTION, CONCENTRATE INTRAVENOUS at 08:43

## 2025-01-01 RX ADMIN — DOXYCYCLINE 100 MG: 100 TABLET, FILM COATED ORAL at 18:13

## 2025-01-01 RX ADMIN — CARVEDILOL 3.12 MG: 3.12 TABLET, FILM COATED ORAL at 08:53

## 2025-01-01 RX ADMIN — INSULIN LISPRO 3 UNITS: 100 INJECTION, SOLUTION INTRAVENOUS; SUBCUTANEOUS at 00:29

## 2025-01-01 RX ADMIN — Medication 100 MCG: at 18:57

## 2025-01-01 RX ADMIN — FUROSEMIDE 20 MG: 20 TABLET ORAL at 05:30

## 2025-01-01 RX ADMIN — RIFAXIMIN 550 MG: 550 TABLET ORAL at 05:39

## 2025-01-01 RX ADMIN — POTASSIUM CHLORIDE 10 MEQ: 7.46 INJECTION, SOLUTION INTRAVENOUS at 14:49

## 2025-01-01 RX ADMIN — CALCIUM GLUCONATE 2 G: 20 INJECTION, SOLUTION INTRAVENOUS at 09:23

## 2025-01-01 RX ADMIN — RIFAXIMIN 550 MG: 550 TABLET ORAL at 05:34

## 2025-01-01 RX ADMIN — NYSTATIN: 100000 POWDER TOPICAL at 17:00

## 2025-01-01 RX ADMIN — AMLODIPINE BESYLATE 5 MG: 5 TABLET ORAL at 05:30

## 2025-01-01 RX ADMIN — NOREPINEPHRINE BITARTRATE 0.6 MCG/KG/MIN: 1 INJECTION, SOLUTION, CONCENTRATE INTRAVENOUS at 19:57

## 2025-01-01 RX ADMIN — AMIODARONE HYDROCHLORIDE 1 MG/MIN: 1.8 INJECTION, SOLUTION INTRAVENOUS at 21:07

## 2025-01-01 RX ADMIN — SODIUM CHLORIDE, POTASSIUM CHLORIDE, SODIUM LACTATE AND CALCIUM CHLORIDE: 600; 310; 30; 20 INJECTION, SOLUTION INTRAVENOUS at 18:12

## 2025-01-01 RX ADMIN — SODIUM BICARBONATE INJECTION,: 84 SOLUTION INTRAVENOUS at 21:04

## 2025-01-01 RX ADMIN — DOXYCYCLINE 100 MG: 100 TABLET, FILM COATED ORAL at 17:12

## 2025-01-01 RX ADMIN — LIDOCAINE HYDROCHLORIDE 2 ML: 10 INJECTION, SOLUTION EPIDURAL; INFILTRATION; INTRACAUDAL; PERINEURAL at 15:49

## 2025-01-01 RX ADMIN — NYSTATIN: 100000 POWDER TOPICAL at 17:25

## 2025-01-01 RX ADMIN — CARVEDILOL 3.12 MG: 3.12 TABLET, FILM COATED ORAL at 18:07

## 2025-01-01 RX ADMIN — CALCIUM GLUCONATE 1 G: 20 INJECTION, SOLUTION INTRAVENOUS at 13:52

## 2025-01-01 RX ADMIN — OXYCODONE HYDROCHLORIDE 5 MG: 5 TABLET ORAL at 21:09

## 2025-01-01 RX ADMIN — PIPERACILLIN AND TAZOBACTAM 3.38 G: 3; .375 INJECTION, POWDER, FOR SOLUTION INTRAVENOUS at 14:41

## 2025-01-01 RX ADMIN — MAGNESIUM SULFATE HEPTAHYDRATE 2 G: 2 INJECTION, SOLUTION INTRAVENOUS at 08:46

## 2025-01-01 RX ADMIN — OXYCODONE HYDROCHLORIDE 5 MG: 5 TABLET ORAL at 23:17

## 2025-01-01 RX ADMIN — RIFAXIMIN 550 MG: 550 TABLET ORAL at 18:06

## 2025-01-01 RX ADMIN — LINEZOLID 600 MG: 600 INJECTION, SOLUTION INTRAVENOUS at 17:23

## 2025-01-01 RX ADMIN — PROCHLORPERAZINE EDISYLATE 10 MG: 5 INJECTION INTRAMUSCULAR; INTRAVENOUS at 23:50

## 2025-01-01 RX ADMIN — RIFAXIMIN 550 MG: 550 TABLET ORAL at 18:13

## 2025-01-01 RX ADMIN — INSULIN LISPRO 3 UNITS: 100 INJECTION, SOLUTION INTRAVENOUS; SUBCUTANEOUS at 23:49

## 2025-01-01 RX ADMIN — LACTULOSE 15 ML: 10 SOLUTION ORAL at 05:39

## 2025-01-01 RX ADMIN — PIPERACILLIN AND TAZOBACTAM 3.38 G: 3; .375 INJECTION, POWDER, FOR SOLUTION INTRAVENOUS at 14:00

## 2025-01-01 RX ADMIN — POTASSIUM CHLORIDE 40 MEQ: 1500 TABLET, EXTENDED RELEASE ORAL at 05:34

## 2025-01-01 RX ADMIN — RIFAXIMIN 550 MG: 550 TABLET ORAL at 04:55

## 2025-01-01 RX ADMIN — HYDROCORTISONE SODIUM SUCCINATE 100 MG: 100 INJECTION, POWDER, FOR SOLUTION INTRAMUSCULAR; INTRAVENOUS at 05:39

## 2025-01-01 RX ADMIN — INSULIN LISPRO 4 UNITS: 100 INJECTION, SOLUTION INTRAVENOUS; SUBCUTANEOUS at 19:52

## 2025-01-01 RX ADMIN — VASOPRESSIN 0.03 UNITS/MIN: 20 INJECTION INTRAVENOUS at 07:51

## 2025-01-01 RX ADMIN — CARVEDILOL 3.12 MG: 3.12 TABLET, FILM COATED ORAL at 07:45

## 2025-01-01 RX ADMIN — PIPERACILLIN AND TAZOBACTAM 3.38 G: 3; .375 INJECTION, POWDER, FOR SOLUTION INTRAVENOUS at 13:41

## 2025-01-01 RX ADMIN — CARVEDILOL 3.12 MG: 3.12 TABLET, FILM COATED ORAL at 08:46

## 2025-01-01 RX ADMIN — CARVEDILOL 3.12 MG: 3.12 TABLET, FILM COATED ORAL at 17:22

## 2025-01-01 RX ADMIN — NYSTATIN: 100000 POWDER TOPICAL at 06:07

## 2025-01-01 RX ADMIN — POTASSIUM CHLORIDE 10 MEQ: 7.46 INJECTION, SOLUTION INTRAVENOUS at 12:20

## 2025-01-01 RX ADMIN — CARVEDILOL 3.12 MG: 3.12 TABLET, FILM COATED ORAL at 09:46

## 2025-01-01 RX ADMIN — DAPTOMYCIN 650 MG: 500 INJECTION, POWDER, LYOPHILIZED, FOR SOLUTION INTRAVENOUS at 06:16

## 2025-01-01 RX ADMIN — SODIUM ZIRCONIUM CYCLOSILICATE 10 G: 10 POWDER, FOR SUSPENSION ORAL at 13:53

## 2025-01-01 RX ADMIN — INSULIN GLARGINE-YFGN 7 UNITS: 100 INJECTION, SOLUTION SUBCUTANEOUS at 16:54

## 2025-01-01 RX ADMIN — PIPERACILLIN AND TAZOBACTAM 3.38 G: 3; .375 INJECTION, POWDER, FOR SOLUTION INTRAVENOUS at 21:10

## 2025-01-01 RX ADMIN — SENNOSIDES, DOCUSATE SODIUM 2 TABLET: 50; 8.6 TABLET, FILM COATED ORAL at 05:05

## 2025-01-01 RX ADMIN — LINEZOLID 600 MG: 600 TABLET, FILM COATED ORAL at 17:45

## 2025-01-01 RX ADMIN — DAPTOMYCIN 650 MG: 500 INJECTION, POWDER, LYOPHILIZED, FOR SOLUTION INTRAVENOUS at 06:08

## 2025-01-01 RX ADMIN — DOXYCYCLINE 100 MG: 100 TABLET, FILM COATED ORAL at 06:18

## 2025-01-01 RX ADMIN — NOREPINEPHRINE BITARTRATE 0.05 MCG/KG/MIN: 0.03 INJECTION, SOLUTION INTRAVENOUS at 22:40

## 2025-01-01 RX ADMIN — CALCIUM CHLORIDE 1 G: 100 INJECTION, SOLUTION INTRAVENOUS; INTRAVENTRICULAR at 19:00

## 2025-01-01 RX ADMIN — FAMOTIDINE 20 MG: 20 TABLET, FILM COATED ORAL at 17:15

## 2025-01-01 RX ADMIN — NYSTATIN: 100000 POWDER TOPICAL at 05:54

## 2025-01-01 RX ADMIN — INSULIN GLARGINE-YFGN 7 UNITS: 100 INJECTION, SOLUTION SUBCUTANEOUS at 17:10

## 2025-01-01 RX ADMIN — NOREPINEPHRINE BITARTRATE 0.7 MCG/KG/MIN: 1 INJECTION, SOLUTION, CONCENTRATE INTRAVENOUS at 22:27

## 2025-01-01 RX ADMIN — POTASSIUM CHLORIDE 40 MEQ: 1500 TABLET, EXTENDED RELEASE ORAL at 20:48

## 2025-01-01 RX ADMIN — PIPERACILLIN AND TAZOBACTAM 4.5 G: 4; .5 INJECTION, POWDER, FOR SOLUTION INTRAVENOUS at 00:18

## 2025-01-01 RX ADMIN — SODIUM BICARBONATE 50 MEQ: 84 INJECTION INTRAVENOUS at 06:20

## 2025-01-01 RX ADMIN — AMLODIPINE BESYLATE 5 MG: 5 TABLET ORAL at 05:34

## 2025-01-01 RX ADMIN — AMIODARONE HYDROCHLORIDE 0.5 MG/MIN: 1.8 INJECTION, SOLUTION INTRAVENOUS at 23:45

## 2025-01-01 RX ADMIN — PIPERACILLIN AND TAZOBACTAM 3.38 G: 3; .375 INJECTION, POWDER, FOR SOLUTION INTRAVENOUS at 05:44

## 2025-01-01 RX ADMIN — CARVEDILOL 3.12 MG: 3.12 TABLET, FILM COATED ORAL at 14:35

## 2025-01-01 RX ADMIN — INSULIN LISPRO 3 UNITS: 100 INJECTION, SOLUTION INTRAVENOUS; SUBCUTANEOUS at 14:26

## 2025-01-01 RX ADMIN — DEXTROSE MONOHYDRATE 50 ML: 25 INJECTION, SOLUTION INTRAVENOUS at 19:00

## 2025-01-01 RX ADMIN — HYDROCORTISONE SODIUM SUCCINATE 100 MG: 100 INJECTION, POWDER, FOR SOLUTION INTRAMUSCULAR; INTRAVENOUS at 06:20

## 2025-01-01 RX ADMIN — SODIUM CHLORIDE, POTASSIUM CHLORIDE, SODIUM LACTATE AND CALCIUM CHLORIDE 1000 ML: 600; 310; 30; 20 INJECTION, SOLUTION INTRAVENOUS at 23:03

## 2025-01-01 RX ADMIN — PIPERACILLIN AND TAZOBACTAM 3.38 G: 3; .375 INJECTION, POWDER, FOR SOLUTION INTRAVENOUS at 17:24

## 2025-01-01 RX ADMIN — DAPTOMYCIN 650 MG: 500 INJECTION, POWDER, LYOPHILIZED, FOR SOLUTION INTRAVENOUS at 05:33

## 2025-01-01 RX ADMIN — HEPARIN SODIUM 5000 UNITS: 5000 INJECTION, SOLUTION INTRAVENOUS; SUBCUTANEOUS at 05:57

## 2025-01-01 RX ADMIN — OXYCODONE HYDROCHLORIDE 5 MG: 5 TABLET ORAL at 13:17

## 2025-01-01 RX ADMIN — ALBUMIN (HUMAN) 50 G: 0.25 INJECTION, SOLUTION INTRAVENOUS at 23:32

## 2025-01-01 RX ADMIN — NYSTATIN: 100000 POWDER TOPICAL at 12:31

## 2025-01-01 RX ADMIN — DOXYCYCLINE 100 MG: 100 TABLET, FILM COATED ORAL at 18:08

## 2025-01-01 RX ADMIN — RIFAXIMIN 550 MG: 550 TABLET ORAL at 06:20

## 2025-01-01 RX ADMIN — DOXYCYCLINE 100 MG: 100 TABLET, FILM COATED ORAL at 18:00

## 2025-01-01 RX ADMIN — NOREPINEPHRINE BITARTRATE 0.05 MCG/KG/MIN: 0.03 INJECTION, SOLUTION INTRAVENOUS at 18:56

## 2025-01-01 RX ADMIN — LINEZOLID 600 MG: 600 TABLET, FILM COATED ORAL at 18:36

## 2025-01-01 RX ADMIN — PIPERACILLIN AND TAZOBACTAM 4.5 G: 4; .5 INJECTION, POWDER, FOR SOLUTION INTRAVENOUS at 21:19

## 2025-01-01 RX ADMIN — AMLODIPINE BESYLATE 5 MG: 5 TABLET ORAL at 04:56

## 2025-01-01 RX ADMIN — HEPARIN SODIUM 5000 UNITS: 5000 INJECTION, SOLUTION INTRAVENOUS; SUBCUTANEOUS at 05:51

## 2025-01-01 RX ADMIN — SENNOSIDES, DOCUSATE SODIUM 2 TABLET: 50; 8.6 TABLET, FILM COATED ORAL at 05:34

## 2025-01-01 RX ADMIN — INSULIN LISPRO 3 UNITS: 100 INJECTION, SOLUTION INTRAVENOUS; SUBCUTANEOUS at 12:46

## 2025-01-01 RX ADMIN — LACTULOSE 15 ML: 10 SOLUTION ORAL at 12:36

## 2025-01-01 RX ADMIN — EPINEPHRINE 0.05 MG: 0.1 INJECTION, SOLUTION INTRAVENOUS at 18:50

## 2025-01-01 RX ADMIN — ROCURONIUM BROMIDE 100 MG: 10 INJECTION INTRAVENOUS at 18:33

## 2025-01-01 RX ADMIN — OXYCODONE HYDROCHLORIDE 5 MG: 5 TABLET ORAL at 08:53

## 2025-01-01 RX ADMIN — OXYCODONE 5 MG: 5 TABLET ORAL at 15:19

## 2025-01-01 RX ADMIN — INSULIN LISPRO 3 UNITS: 100 INJECTION, SOLUTION INTRAVENOUS; SUBCUTANEOUS at 21:06

## 2025-01-01 RX ADMIN — NYSTATIN: 100000 POWDER TOPICAL at 11:41

## 2025-01-01 RX ADMIN — PIPERACILLIN AND TAZOBACTAM 4.5 G: 4; .5 INJECTION, POWDER, FOR SOLUTION INTRAVENOUS at 18:14

## 2025-01-01 RX ADMIN — AMLODIPINE BESYLATE 5 MG: 5 TABLET ORAL at 05:02

## 2025-01-01 RX ADMIN — CARVEDILOL 6.25 MG: 6.25 TABLET, FILM COATED ORAL at 17:30

## 2025-01-01 RX ADMIN — INSULIN LISPRO 2 UNITS: 100 INJECTION, SOLUTION INTRAVENOUS; SUBCUTANEOUS at 05:28

## 2025-01-01 RX ADMIN — LACTULOSE 30 ML: 10 SOLUTION ORAL; RECTAL at 05:24

## 2025-01-01 RX ADMIN — THIAMINE HYDROCHLORIDE 200 MG: 100 INJECTION, SOLUTION INTRAMUSCULAR; INTRAVENOUS at 07:56

## 2025-01-01 RX ADMIN — OXYCODONE 5 MG: 5 TABLET ORAL at 14:21

## 2025-01-01 RX ADMIN — INSULIN LISPRO 4 UNITS: 100 INJECTION, SOLUTION INTRAVENOUS; SUBCUTANEOUS at 17:57

## 2025-01-01 RX ADMIN — THIAMINE HYDROCHLORIDE 500 MG: 100 INJECTION, SOLUTION INTRAMUSCULAR; INTRAVENOUS at 01:52

## 2025-01-01 RX ADMIN — AMLODIPINE BESYLATE 5 MG: 5 TABLET ORAL at 06:18

## 2025-01-01 RX ADMIN — POTASSIUM CHLORIDE 10 MEQ: 7.46 INJECTION, SOLUTION INTRAVENOUS at 09:54

## 2025-01-01 RX ADMIN — HYDROCORTISONE SODIUM SUCCINATE 100 MG: 100 INJECTION, POWDER, FOR SOLUTION INTRAMUSCULAR; INTRAVENOUS at 13:51

## 2025-01-01 RX ADMIN — NOREPINEPHRINE BITARTRATE 0.53 MCG/KG/MIN: 1 INJECTION, SOLUTION, CONCENTRATE INTRAVENOUS at 20:13

## 2025-01-01 RX ADMIN — INSULIN LISPRO 2 UNITS: 100 INJECTION, SOLUTION INTRAVENOUS; SUBCUTANEOUS at 05:40

## 2025-01-01 RX ADMIN — RIFAXIMIN 550 MG: 550 TABLET ORAL at 17:45

## 2025-01-01 RX ADMIN — INSULIN GLARGINE-YFGN 10 UNITS: 100 INJECTION, SOLUTION SUBCUTANEOUS at 09:27

## 2025-01-01 RX ADMIN — LINEZOLID 600 MG: 600 INJECTION, SOLUTION INTRAVENOUS at 06:19

## 2025-01-01 RX ADMIN — SODIUM BICARBONATE INJECTION,: 84 SOLUTION INTRAVENOUS at 10:46

## 2025-01-01 RX ADMIN — AMLODIPINE BESYLATE 5 MG: 5 TABLET ORAL at 08:46

## 2025-01-01 RX ADMIN — LACTULOSE 30 ML: 10 SOLUTION ORAL; RECTAL at 05:33

## 2025-01-01 RX ADMIN — LACTULOSE 30 ML: 10 SOLUTION ORAL; RECTAL at 18:13

## 2025-01-01 RX ADMIN — SODIUM CHLORIDE, POTASSIUM CHLORIDE, SODIUM LACTATE AND CALCIUM CHLORIDE: 600; 310; 30; 20 INJECTION, SOLUTION INTRAVENOUS at 06:27

## 2025-01-01 RX ADMIN — LACTULOSE 30 ML: 10 SOLUTION ORAL; RECTAL at 17:19

## 2025-01-01 RX ADMIN — POTASSIUM CHLORIDE 20 MEQ: 1500 TABLET, EXTENDED RELEASE ORAL at 11:44

## 2025-01-01 RX ADMIN — MAGNESIUM SULFATE HEPTAHYDRATE 2 G: 2 INJECTION, SOLUTION INTRAVENOUS at 03:39

## 2025-01-01 RX ADMIN — LINEZOLID 600 MG: 600 TABLET, FILM COATED ORAL at 05:33

## 2025-01-01 RX ADMIN — LACTULOSE 15 ML: 10 SOLUTION ORAL at 06:20

## 2025-01-01 RX ADMIN — PIPERACILLIN AND TAZOBACTAM 4.5 G: 4; .5 INJECTION, POWDER, FOR SOLUTION INTRAVENOUS at 17:53

## 2025-01-01 RX ADMIN — RIFAXIMIN 550 MG: 550 TABLET ORAL at 23:05

## 2025-01-01 RX ADMIN — RIFAXIMIN 550 MG: 550 TABLET ORAL at 17:19

## 2025-01-01 RX ADMIN — NOREPINEPHRINE BITARTRATE 0.37 MCG/KG/MIN: 0.03 INJECTION, SOLUTION INTRAVENOUS at 12:57

## 2025-01-01 RX ADMIN — CARVEDILOL 3.12 MG: 3.12 TABLET, FILM COATED ORAL at 17:45

## 2025-01-01 RX ADMIN — DOXYCYCLINE 100 MG: 100 TABLET, FILM COATED ORAL at 17:22

## 2025-01-01 RX ADMIN — NYSTATIN: 100000 POWDER TOPICAL at 05:06

## 2025-01-01 RX ADMIN — NYSTATIN: 100000 POWDER TOPICAL at 11:37

## 2025-01-01 RX ADMIN — AMLODIPINE BESYLATE 5 MG: 5 TABLET ORAL at 05:00

## 2025-01-01 RX ADMIN — DEXMEDETOMIDINE 0.2 MCG/KG/HR: 100 INJECTION, SOLUTION INTRAVENOUS at 19:32

## 2025-01-01 RX ADMIN — RIFAXIMIN 550 MG: 550 TABLET ORAL at 05:42

## 2025-01-01 RX ADMIN — INSULIN GLARGINE-YFGN 7 UNITS: 100 INJECTION, SOLUTION SUBCUTANEOUS at 18:24

## 2025-01-01 RX ADMIN — PIPERACILLIN AND TAZOBACTAM 3.38 G: 3; .375 INJECTION, POWDER, FOR SOLUTION INTRAVENOUS at 05:38

## 2025-01-01 RX ADMIN — VASOPRESSIN 0.03 UNITS/MIN: 20 INJECTION INTRAVENOUS at 21:12

## 2025-01-01 RX ADMIN — INSULIN LISPRO 3 UNITS: 100 INJECTION, SOLUTION INTRAVENOUS; SUBCUTANEOUS at 21:34

## 2025-01-01 RX ADMIN — AMLODIPINE BESYLATE 5 MG: 5 TABLET ORAL at 05:41

## 2025-01-01 RX ADMIN — LINEZOLID 600 MG: 600 INJECTION, SOLUTION INTRAVENOUS at 18:11

## 2025-01-01 RX ADMIN — RIFAXIMIN 550 MG: 550 TABLET ORAL at 17:22

## 2025-01-01 RX ADMIN — HYDROCORTISONE SODIUM SUCCINATE 100 MG: 100 INJECTION, POWDER, FOR SOLUTION INTRAMUSCULAR; INTRAVENOUS at 05:05

## 2025-01-01 RX ADMIN — RIFAXIMIN 550 MG: 550 TABLET ORAL at 05:05

## 2025-01-01 RX ADMIN — PIPERACILLIN AND TAZOBACTAM 3.38 G: 3; .375 INJECTION, POWDER, FOR SOLUTION INTRAVENOUS at 19:48

## 2025-01-01 RX ADMIN — AMIODARONE HYDROCHLORIDE 150 MG: 1.5 INJECTION, SOLUTION INTRAVENOUS at 20:55

## 2025-01-01 RX ADMIN — OXYCODONE HYDROCHLORIDE 5 MG: 5 TABLET ORAL at 08:39

## 2025-01-01 RX ADMIN — CARVEDILOL 3.12 MG: 3.12 TABLET, FILM COATED ORAL at 17:10

## 2025-01-01 RX ADMIN — PIPERACILLIN AND TAZOBACTAM 4.5 G: 4; .5 INJECTION, POWDER, FOR SOLUTION INTRAVENOUS at 07:05

## 2025-01-01 RX ADMIN — OXYCODONE HYDROCHLORIDE 5 MG: 5 TABLET ORAL at 18:06

## 2025-01-01 RX ADMIN — INSULIN LISPRO 3 UNITS: 100 INJECTION, SOLUTION INTRAVENOUS; SUBCUTANEOUS at 12:42

## 2025-01-01 RX ADMIN — HEPARIN SODIUM 5000 UNITS: 5000 INJECTION, SOLUTION INTRAVENOUS; SUBCUTANEOUS at 21:20

## 2025-01-01 RX ADMIN — RIFAXIMIN 550 MG: 550 TABLET ORAL at 05:00

## 2025-01-01 RX ADMIN — THIAMINE HYDROCHLORIDE 200 MG: 100 INJECTION, SOLUTION INTRAMUSCULAR; INTRAVENOUS at 05:39

## 2025-01-01 RX ADMIN — LINEZOLID 600 MG: 600 INJECTION, SOLUTION INTRAVENOUS at 05:05

## 2025-01-01 RX ADMIN — PANTOPRAZOLE SODIUM 40 MG: 40 INJECTION, POWDER, FOR SOLUTION INTRAVENOUS at 00:49

## 2025-01-01 RX ADMIN — POTASSIUM CHLORIDE 40 MEQ: 1500 TABLET, EXTENDED RELEASE ORAL at 13:14

## 2025-01-01 RX ADMIN — PIPERACILLIN AND TAZOBACTAM 3.38 G: 3; .375 INJECTION, POWDER, FOR SOLUTION INTRAVENOUS at 05:19

## 2025-01-01 RX ADMIN — HEPARIN SODIUM 5000 UNITS: 5000 INJECTION, SOLUTION INTRAVENOUS; SUBCUTANEOUS at 05:05

## 2025-01-01 RX ADMIN — POTASSIUM CHLORIDE 10 MEQ: 7.46 INJECTION, SOLUTION INTRAVENOUS at 13:37

## 2025-01-01 RX ADMIN — LACTULOSE 30 ML: 10 SOLUTION ORAL at 08:56

## 2025-01-01 RX ADMIN — HYDROCORTISONE SODIUM SUCCINATE 100 MG: 100 INJECTION, POWDER, FOR SOLUTION INTRAMUSCULAR; INTRAVENOUS at 20:28

## 2025-01-01 RX ADMIN — RIFAXIMIN 550 MG: 550 TABLET ORAL at 05:22

## 2025-01-01 RX ADMIN — NYSTATIN: 100000 POWDER TOPICAL at 16:56

## 2025-01-01 RX ADMIN — LACTULOSE 30 ML: 10 SOLUTION ORAL; RECTAL at 18:08

## 2025-01-01 RX ADMIN — IPRATROPIUM BROMIDE AND ALBUTEROL SULFATE 3 ML: .5; 2.5 SOLUTION RESPIRATORY (INHALATION) at 02:35

## 2025-01-01 RX ADMIN — LINEZOLID 600 MG: 600 TABLET, FILM COATED ORAL at 05:22

## 2025-01-01 RX ADMIN — HEPARIN SODIUM 5000 UNITS: 5000 INJECTION, SOLUTION INTRAVENOUS; SUBCUTANEOUS at 21:32

## 2025-01-01 RX ADMIN — CARVEDILOL 3.12 MG: 3.12 TABLET, FILM COATED ORAL at 07:48

## 2025-01-01 RX ADMIN — CARVEDILOL 3.12 MG: 3.12 TABLET, FILM COATED ORAL at 09:52

## 2025-01-01 ASSESSMENT — PATIENT HEALTH QUESTIONNAIRE - PHQ9
1. LITTLE INTEREST OR PLEASURE IN DOING THINGS: NOT AT ALL
SUM OF ALL RESPONSES TO PHQ9 QUESTIONS 1 AND 2: 0
2. FEELING DOWN, DEPRESSED, IRRITABLE, OR HOPELESS: NOT AT ALL
1. LITTLE INTEREST OR PLEASURE IN DOING THINGS: NOT AT ALL
2. FEELING DOWN, DEPRESSED, IRRITABLE, OR HOPELESS: NOT AT ALL
SUM OF ALL RESPONSES TO PHQ9 QUESTIONS 1 AND 2: 0
2. FEELING DOWN, DEPRESSED, IRRITABLE, OR HOPELESS: NOT AT ALL
1. LITTLE INTEREST OR PLEASURE IN DOING THINGS: NOT AT ALL
SUM OF ALL RESPONSES TO PHQ9 QUESTIONS 1 AND 2: 0

## 2025-01-01 ASSESSMENT — PAIN DESCRIPTION - PAIN TYPE
TYPE: ACUTE PAIN
TYPE: CHRONIC PAIN
TYPE: CHRONIC PAIN
TYPE: ACUTE PAIN
TYPE: ACUTE PAIN;CHRONIC PAIN
TYPE: ACUTE PAIN
TYPE: ACUTE PAIN;CHRONIC PAIN
TYPE: ACUTE PAIN
TYPE: ACUTE PAIN
TYPE: ACUTE PAIN;CHRONIC PAIN
TYPE: ACUTE PAIN
TYPE: ACUTE PAIN;CHRONIC PAIN
TYPE: ACUTE PAIN
TYPE: CHRONIC PAIN
TYPE: ACUTE PAIN
TYPE: ACUTE PAIN;CHRONIC PAIN
TYPE: ACUTE PAIN
TYPE: ACUTE PAIN;CHRONIC PAIN
TYPE: ACUTE PAIN
TYPE: ACUTE PAIN

## 2025-01-01 ASSESSMENT — ENCOUNTER SYMPTOMS
CHILLS: 0
POLYDIPSIA: 0
ABDOMINAL PAIN: 0
WEAKNESS: 1
HEARTBURN: 0
DEPRESSION: 0
SHORTNESS OF BREATH: 1
PALPITATIONS: 0
NERVOUS/ANXIOUS: 1
NERVOUS/ANXIOUS: 0
FEVER: 0
NERVOUS/ANXIOUS: 1
CARDIOVASCULAR NEGATIVE: 1
PALPITATIONS: 0
FEVER: 0
FOCAL WEAKNESS: 0
NECK PAIN: 0
FEVER: 0
BACK PAIN: 0
NERVOUS/ANXIOUS: 0
BACK PAIN: 0
HEARTBURN: 0
CHILLS: 0
EYES NEGATIVE: 1
EYES NEGATIVE: 1
MUSCULOSKELETAL NEGATIVE: 1
DEPRESSION: 0
DEPRESSION: 0
BACK PAIN: 0
CARDIOVASCULAR NEGATIVE: 1
COUGH: 1
COUGH: 1
FOCAL WEAKNESS: 0
COUGH: 1
POLYDIPSIA: 0
CHILLS: 0
MUSCULOSKELETAL NEGATIVE: 1
NECK PAIN: 0
BRUISES/BLEEDS EASILY: 0
HEADACHES: 0
FEVER: 0
VOMITING: 0
PALPITATIONS: 0
ABDOMINAL PAIN: 1
CARDIOVASCULAR NEGATIVE: 1
FEVER: 0
DIZZINESS: 0
SHORTNESS OF BREATH: 1
HEADACHES: 0
SHORTNESS OF BREATH: 1
BRUISES/BLEEDS EASILY: 0
DIZZINESS: 0
BRUISES/BLEEDS EASILY: 0
PALPITATIONS: 0
HEADACHES: 0
FEVER: 0
BRUISES/BLEEDS EASILY: 0
ABDOMINAL PAIN: 1
POLYDIPSIA: 0
POLYDIPSIA: 0
MYALGIAS: 1
NAUSEA: 0
CHILLS: 0
POLYDIPSIA: 0
CARDIOVASCULAR NEGATIVE: 1
VOMITING: 0
PALPITATIONS: 0
WEAKNESS: 1
HEARTBURN: 0
BACK PAIN: 0
NERVOUS/ANXIOUS: 1
NAUSEA: 0
HEADACHES: 0
MUSCULOSKELETAL NEGATIVE: 1
BRUISES/BLEEDS EASILY: 0
DIZZINESS: 0
DEPRESSION: 0
ABDOMINAL PAIN: 1
DIZZINESS: 0
COUGH: 1
NAUSEA: 0
DIARRHEA: 0
NAUSEA: 0
NERVOUS/ANXIOUS: 1
WEAKNESS: 1
COUGH: 1
FOCAL WEAKNESS: 0
VOMITING: 0
ABDOMINAL PAIN: 0
DIZZINESS: 0
ABDOMINAL PAIN: 1
MUSCULOSKELETAL NEGATIVE: 1
DEPRESSION: 0
CONSTIPATION: 0
EYES NEGATIVE: 1
MUSCULOSKELETAL NEGATIVE: 1
NECK PAIN: 0
POLYDIPSIA: 0
ABDOMINAL PAIN: 1
CHILLS: 0
VOMITING: 0
NERVOUS/ANXIOUS: 1
CARDIOVASCULAR NEGATIVE: 1
PALPITATIONS: 0
HEARTBURN: 0
CONSTIPATION: 0
NECK PAIN: 0
VOMITING: 0
DIZZINESS: 0
HEADACHES: 0
WEAKNESS: 1
WEAKNESS: 1
NERVOUS/ANXIOUS: 1
FEVER: 0
HEARTBURN: 0
HEADACHES: 0
SHORTNESS OF BREATH: 1
FOCAL WEAKNESS: 0
DIZZINESS: 0
CARDIOVASCULAR NEGATIVE: 1
ABDOMINAL PAIN: 1
WEAKNESS: 1
DIARRHEA: 0
NAUSEA: 0
MUSCULOSKELETAL NEGATIVE: 1
BACK PAIN: 0
SHORTNESS OF BREATH: 0
MYALGIAS: 1
MYALGIAS: 0
NECK PAIN: 0
VOMITING: 0
FOCAL WEAKNESS: 0
EYES NEGATIVE: 1
FOCAL WEAKNESS: 0
SHORTNESS OF BREATH: 1
HEARTBURN: 0
BACK PAIN: 0
DEPRESSION: 0
EYES NEGATIVE: 1
NECK PAIN: 0
ABDOMINAL PAIN: 0
CHILLS: 0
BRUISES/BLEEDS EASILY: 0
NAUSEA: 0
SHORTNESS OF BREATH: 1
COUGH: 0
COUGH: 1
PALPITATIONS: 0
EYES NEGATIVE: 1

## 2025-01-01 ASSESSMENT — COGNITIVE AND FUNCTIONAL STATUS - GENERAL
MOVING FROM LYING ON BACK TO SITTING ON SIDE OF FLAT BED: A LOT
EATING MEALS: A LOT
DRESSING REGULAR LOWER BODY CLOTHING: TOTAL
MOBILITY SCORE: 8
MOVING TO AND FROM BED TO CHAIR: TOTAL
EATING MEALS: TOTAL
EATING MEALS: A LOT
SUGGESTED CMS G CODE MODIFIER DAILY ACTIVITY: CM
TOILETING: TOTAL
CLIMB 3 TO 5 STEPS WITH RAILING: TOTAL
PERSONAL GROOMING: A LOT
TURNING FROM BACK TO SIDE WHILE IN FLAT BAD: TOTAL
DRESSING REGULAR UPPER BODY CLOTHING: TOTAL
SUGGESTED CMS G CODE MODIFIER MOBILITY: CN
MOVING FROM LYING ON BACK TO SITTING ON SIDE OF FLAT BED: TOTAL
MOBILITY SCORE: 6
HELP NEEDED FOR BATHING: TOTAL
SUGGESTED CMS G CODE MODIFIER MOBILITY: CM
DAILY ACTIVITIY SCORE: 8
STANDING UP FROM CHAIR USING ARMS: TOTAL
WALKING IN HOSPITAL ROOM: TOTAL
MOVING TO AND FROM BED TO CHAIR: TOTAL
DAILY ACTIVITIY SCORE: 6
MOVING FROM LYING ON BACK TO SITTING ON SIDE OF FLAT BED: A LOT
PERSONAL GROOMING: TOTAL
DRESSING REGULAR UPPER BODY CLOTHING: TOTAL
STANDING UP FROM CHAIR USING ARMS: TOTAL
HELP NEEDED FOR BATHING: TOTAL
STANDING UP FROM CHAIR USING ARMS: A LOT
TOILETING: TOTAL
DAILY ACTIVITIY SCORE: 8
HELP NEEDED FOR BATHING: TOTAL
WALKING IN HOSPITAL ROOM: TOTAL
CLIMB 3 TO 5 STEPS WITH RAILING: TOTAL
PERSONAL GROOMING: A LOT
DRESSING REGULAR LOWER BODY CLOTHING: TOTAL
TURNING FROM BACK TO SIDE WHILE IN FLAT BAD: A LOT
TOILETING: TOTAL
DRESSING REGULAR UPPER BODY CLOTHING: TOTAL
SUGGESTED CMS G CODE MODIFIER DAILY ACTIVITY: CN
SUGGESTED CMS G CODE MODIFIER DAILY ACTIVITY: CM
TURNING FROM BACK TO SIDE WHILE IN FLAT BAD: A LOT
SUGGESTED CMS G CODE MODIFIER MOBILITY: CM
MOVING TO AND FROM BED TO CHAIR: TOTAL
MOBILITY SCORE: 9
WALKING IN HOSPITAL ROOM: TOTAL
DRESSING REGULAR LOWER BODY CLOTHING: TOTAL
CLIMB 3 TO 5 STEPS WITH RAILING: TOTAL

## 2025-01-01 ASSESSMENT — FIBROSIS 4 INDEX
FIB4 SCORE: 11.25
FIB4 SCORE: 8.38
FIB4 SCORE: 16.16
FIB4 SCORE: 9.76
FIB4 SCORE: 7.04
FIB4 SCORE: 12.13
FIB4 SCORE: 23.05
FIB4 SCORE: 15.81
FIB4 SCORE: 11.25
FIB4 SCORE: 14.11
FIB4 SCORE: 9.75

## 2025-01-01 ASSESSMENT — GAIT ASSESSMENTS
GAIT LEVEL OF ASSIST: UNABLE TO PARTICIPATE
GAIT LEVEL OF ASSIST: UNABLE TO PARTICIPATE

## 2025-01-01 ASSESSMENT — ACTIVITIES OF DAILY LIVING (ADL): TOILETING: REQUIRES ASSIST

## 2025-01-01 ASSESSMENT — PULMONARY FUNCTION TESTS: FVC: 1.1

## 2025-01-07 ENCOUNTER — TELEPHONE (OUTPATIENT)
Dept: CARDIOLOGY | Facility: MEDICAL CENTER | Age: 65
End: 2025-01-07
Payer: MEDICAID

## 2025-01-15 ENCOUNTER — HOSPITAL ENCOUNTER (INPATIENT)
Facility: MEDICAL CENTER | Age: 65
LOS: 2 days | DRG: 442 | End: 2025-01-17
Attending: EMERGENCY MEDICINE | Admitting: STUDENT IN AN ORGANIZED HEALTH CARE EDUCATION/TRAINING PROGRAM
Payer: MEDICAID

## 2025-01-15 ENCOUNTER — APPOINTMENT (OUTPATIENT)
Dept: RADIOLOGY | Facility: MEDICAL CENTER | Age: 65
DRG: 442 | End: 2025-01-15
Attending: EMERGENCY MEDICINE
Payer: MEDICAID

## 2025-01-15 DIAGNOSIS — G93.41 METABOLIC ENCEPHALOPATHY: ICD-10-CM

## 2025-01-15 DIAGNOSIS — E72.20 HYPERAMMONEMIA (HCC): ICD-10-CM

## 2025-01-15 PROBLEM — K76.82 ACUTE HEPATIC ENCEPHALOPATHY (HCC): Status: ACTIVE | Noted: 2025-01-15

## 2025-01-15 LAB
ALBUMIN SERPL BCP-MCNC: 2.8 G/DL (ref 3.2–4.9)
ALBUMIN/GLOB SERPL: 0.6 G/DL
ALP SERPL-CCNC: 186 U/L (ref 30–99)
ALT SERPL-CCNC: 11 U/L (ref 2–50)
AMMONIA PLAS-SCNC: 85 UMOL/L (ref 11–45)
ANION GAP SERPL CALC-SCNC: 12 MMOL/L (ref 7–16)
APPEARANCE UR: CLEAR
APTT PPP: 37.2 SEC (ref 24.7–36)
AST SERPL-CCNC: 19 U/L (ref 12–45)
BACTERIA #/AREA URNS HPF: ABNORMAL /HPF
BASOPHILS # BLD AUTO: 1.5 % (ref 0–1.8)
BASOPHILS # BLD: 0.06 K/UL (ref 0–0.12)
BILIRUB SERPL-MCNC: 1.2 MG/DL (ref 0.1–1.5)
BILIRUB UR QL STRIP.AUTO: NEGATIVE
BUN SERPL-MCNC: 29 MG/DL (ref 8–22)
BURR CELLS BLD QL SMEAR: NORMAL
CALCIUM ALBUM COR SERPL-MCNC: 9.7 MG/DL (ref 8.5–10.5)
CALCIUM SERPL-MCNC: 8.7 MG/DL (ref 8.5–10.5)
CASTS URNS QL MICRO: ABNORMAL /LPF (ref 0–2)
CHLORIDE SERPL-SCNC: 109 MMOL/L (ref 96–112)
CO2 SERPL-SCNC: 18 MMOL/L (ref 20–33)
COLOR UR: YELLOW
COMMENT 1642: NORMAL
CREAT SERPL-MCNC: 0.81 MG/DL (ref 0.5–1.4)
EKG IMPRESSION: NORMAL
EOSINOPHIL # BLD AUTO: 0.17 K/UL (ref 0–0.51)
EOSINOPHIL NFR BLD: 4.1 % (ref 0–6.9)
EPITHELIAL CELLS 1715: ABNORMAL /HPF (ref 0–5)
ERYTHROCYTE [DISTWIDTH] IN BLOOD BY AUTOMATED COUNT: 51.3 FL (ref 35.9–50)
GFR SERPLBLD CREATININE-BSD FMLA CKD-EPI: 81 ML/MIN/1.73 M 2
GLOBULIN SER CALC-MCNC: 5 G/DL (ref 1.9–3.5)
GLUCOSE SERPL-MCNC: 114 MG/DL (ref 65–99)
GLUCOSE UR STRIP.AUTO-MCNC: NEGATIVE MG/DL
HCT VFR BLD AUTO: 34.8 % (ref 37–47)
HGB BLD-MCNC: 11.1 G/DL (ref 12–16)
IMM GRANULOCYTES # BLD AUTO: 0.01 K/UL (ref 0–0.11)
IMM GRANULOCYTES NFR BLD AUTO: 0.2 % (ref 0–0.9)
INR PPP: 1.39 (ref 0.87–1.13)
KETONES UR STRIP.AUTO-MCNC: NEGATIVE MG/DL
LACTATE SERPL-SCNC: 1.3 MMOL/L (ref 0.5–2)
LEUKOCYTE ESTERASE UR QL STRIP.AUTO: NEGATIVE
LYMPHOCYTES # BLD AUTO: 1.13 K/UL (ref 1–4.8)
LYMPHOCYTES NFR BLD: 27.4 % (ref 22–41)
MAGNESIUM SERPL-MCNC: 1.8 MG/DL (ref 1.5–2.5)
MCH RBC QN AUTO: 28.6 PG (ref 27–33)
MCHC RBC AUTO-ENTMCNC: 31.9 G/DL (ref 32.2–35.5)
MCV RBC AUTO: 89.7 FL (ref 81.4–97.8)
MICRO URNS: ABNORMAL
MONOCYTES # BLD AUTO: 0.31 K/UL (ref 0–0.85)
MONOCYTES NFR BLD AUTO: 7.5 % (ref 0–13.4)
MORPHOLOGY BLD-IMP: NORMAL
NEUTROPHILS # BLD AUTO: 2.44 K/UL (ref 1.82–7.42)
NEUTROPHILS NFR BLD: 59.3 % (ref 44–72)
NITRITE UR QL STRIP.AUTO: NEGATIVE
NRBC # BLD AUTO: 0 K/UL
NRBC BLD-RTO: 0 /100 WBC (ref 0–0.2)
OVALOCYTES BLD QL SMEAR: NORMAL
PH UR STRIP.AUTO: 7.5 [PH] (ref 5–8)
PLATELET # BLD AUTO: 66 K/UL (ref 164–446)
PLATELET BLD QL SMEAR: NORMAL
PLATELETS.RETICULATED NFR BLD AUTO: 1.5 % (ref 0.6–13.1)
PMV BLD AUTO: 9.9 FL (ref 9–12.9)
POIKILOCYTOSIS BLD QL SMEAR: NORMAL
POTASSIUM SERPL-SCNC: 4.3 MMOL/L (ref 3.6–5.5)
PROCALCITONIN SERPL-MCNC: 0.06 NG/ML
PROT SERPL-MCNC: 7.8 G/DL (ref 6–8.2)
PROT UR QL STRIP: 30 MG/DL
PROTHROMBIN TIME: 17.1 SEC (ref 12–14.6)
RBC # BLD AUTO: 3.88 M/UL (ref 4.2–5.4)
RBC # URNS HPF: ABNORMAL /HPF (ref 0–2)
RBC BLD AUTO: PRESENT
RBC UR QL AUTO: ABNORMAL
SODIUM SERPL-SCNC: 139 MMOL/L (ref 135–145)
SP GR UR STRIP.AUTO: 1.01
TROPONIN T SERPL-MCNC: 20 NG/L (ref 6–19)
UROBILINOGEN UR STRIP.AUTO-MCNC: 0.2 EU/DL
WBC # BLD AUTO: 4.1 K/UL (ref 4.8–10.8)
WBC #/AREA URNS HPF: ABNORMAL /HPF

## 2025-01-15 PROCEDURE — 87040 BLOOD CULTURE FOR BACTERIA: CPT | Mod: 91

## 2025-01-15 PROCEDURE — 700102 HCHG RX REV CODE 250 W/ 637 OVERRIDE(OP): Performed by: EMERGENCY MEDICINE

## 2025-01-15 PROCEDURE — 85610 PROTHROMBIN TIME: CPT

## 2025-01-15 PROCEDURE — 84484 ASSAY OF TROPONIN QUANT: CPT

## 2025-01-15 PROCEDURE — 99223 1ST HOSP IP/OBS HIGH 75: CPT | Performed by: STUDENT IN AN ORGANIZED HEALTH CARE EDUCATION/TRAINING PROGRAM

## 2025-01-15 PROCEDURE — 71045 X-RAY EXAM CHEST 1 VIEW: CPT

## 2025-01-15 PROCEDURE — 84145 PROCALCITONIN (PCT): CPT

## 2025-01-15 PROCEDURE — 87086 URINE CULTURE/COLONY COUNT: CPT

## 2025-01-15 PROCEDURE — 700102 HCHG RX REV CODE 250 W/ 637 OVERRIDE(OP): Performed by: STUDENT IN AN ORGANIZED HEALTH CARE EDUCATION/TRAINING PROGRAM

## 2025-01-15 PROCEDURE — 99285 EMERGENCY DEPT VISIT HI MDM: CPT

## 2025-01-15 PROCEDURE — 81001 URINALYSIS AUTO W/SCOPE: CPT

## 2025-01-15 PROCEDURE — 85025 COMPLETE CBC W/AUTO DIFF WBC: CPT

## 2025-01-15 PROCEDURE — 70450 CT HEAD/BRAIN W/O DYE: CPT

## 2025-01-15 PROCEDURE — 85055 RETICULATED PLATELET ASSAY: CPT

## 2025-01-15 PROCEDURE — A9270 NON-COVERED ITEM OR SERVICE: HCPCS | Performed by: STUDENT IN AN ORGANIZED HEALTH CARE EDUCATION/TRAINING PROGRAM

## 2025-01-15 PROCEDURE — 36415 COLL VENOUS BLD VENIPUNCTURE: CPT

## 2025-01-15 PROCEDURE — 82140 ASSAY OF AMMONIA: CPT

## 2025-01-15 PROCEDURE — 80053 COMPREHEN METABOLIC PANEL: CPT

## 2025-01-15 PROCEDURE — 93005 ELECTROCARDIOGRAM TRACING: CPT | Mod: TC | Performed by: EMERGENCY MEDICINE

## 2025-01-15 PROCEDURE — 770006 HCHG ROOM/CARE - MED/SURG/GYN SEMI*

## 2025-01-15 PROCEDURE — 85730 THROMBOPLASTIN TIME PARTIAL: CPT

## 2025-01-15 PROCEDURE — 83735 ASSAY OF MAGNESIUM: CPT

## 2025-01-15 PROCEDURE — A9270 NON-COVERED ITEM OR SERVICE: HCPCS | Performed by: EMERGENCY MEDICINE

## 2025-01-15 PROCEDURE — 83605 ASSAY OF LACTIC ACID: CPT

## 2025-01-15 RX ORDER — ONDANSETRON 2 MG/ML
4 INJECTION INTRAMUSCULAR; INTRAVENOUS EVERY 4 HOURS PRN
Status: DISCONTINUED | OUTPATIENT
Start: 2025-01-15 | End: 2025-01-15

## 2025-01-15 RX ORDER — LABETALOL HYDROCHLORIDE 5 MG/ML
10 INJECTION, SOLUTION INTRAVENOUS EVERY 4 HOURS PRN
Status: DISCONTINUED | OUTPATIENT
Start: 2025-01-15 | End: 2025-01-17 | Stop reason: HOSPADM

## 2025-01-15 RX ORDER — LACTULOSE 10 G/15ML
30 SOLUTION ORAL ONCE
Status: COMPLETED | OUTPATIENT
Start: 2025-01-15 | End: 2025-01-15

## 2025-01-15 RX ORDER — IPRATROPIUM BROMIDE AND ALBUTEROL SULFATE 2.5; .5 MG/3ML; MG/3ML
3 SOLUTION RESPIRATORY (INHALATION) EVERY 4 HOURS PRN
COMMUNITY

## 2025-01-15 RX ORDER — GUAIFENESIN/DEXTROMETHORPHAN 100-10MG/5
10 SYRUP ORAL EVERY 6 HOURS PRN
Status: DISCONTINUED | OUTPATIENT
Start: 2025-01-15 | End: 2025-01-17 | Stop reason: HOSPADM

## 2025-01-15 RX ORDER — LACTULOSE 10 G/15ML
45 SOLUTION ORAL 3 TIMES DAILY
Status: DISCONTINUED | OUTPATIENT
Start: 2025-01-16 | End: 2025-01-17 | Stop reason: HOSPADM

## 2025-01-15 RX ORDER — PROCHLORPERAZINE EDISYLATE 5 MG/ML
5-10 INJECTION INTRAMUSCULAR; INTRAVENOUS EVERY 4 HOURS PRN
Status: DISCONTINUED | OUTPATIENT
Start: 2025-01-15 | End: 2025-01-17 | Stop reason: HOSPADM

## 2025-01-15 RX ORDER — ACETAMINOPHEN 325 MG/1
650 TABLET ORAL EVERY 6 HOURS PRN
Status: DISCONTINUED | OUTPATIENT
Start: 2025-01-15 | End: 2025-01-17 | Stop reason: HOSPADM

## 2025-01-15 RX ORDER — CARVEDILOL 6.25 MG/1
6.25 TABLET ORAL 2 TIMES DAILY WITH MEALS
COMMUNITY

## 2025-01-15 RX ORDER — ONDANSETRON 4 MG/1
4 TABLET, ORALLY DISINTEGRATING ORAL EVERY 4 HOURS PRN
Status: DISCONTINUED | OUTPATIENT
Start: 2025-01-15 | End: 2025-01-15

## 2025-01-15 RX ORDER — PROMETHAZINE HYDROCHLORIDE 25 MG/1
12.5-25 SUPPOSITORY RECTAL EVERY 4 HOURS PRN
Status: DISCONTINUED | OUTPATIENT
Start: 2025-01-15 | End: 2025-01-17 | Stop reason: HOSPADM

## 2025-01-15 RX ORDER — SODIUM CHLORIDE, SODIUM LACTATE, POTASSIUM CHLORIDE, AND CALCIUM CHLORIDE .6; .31; .03; .02 G/100ML; G/100ML; G/100ML; G/100ML
500 INJECTION, SOLUTION INTRAVENOUS
Status: DISCONTINUED | OUTPATIENT
Start: 2025-01-15 | End: 2025-01-17 | Stop reason: HOSPADM

## 2025-01-15 RX ORDER — OXYCODONE HYDROCHLORIDE 10 MG/1
10 TABLET ORAL EVERY 8 HOURS PRN
Status: ON HOLD | COMMUNITY
End: 2025-01-31

## 2025-01-15 RX ORDER — PROMETHAZINE HYDROCHLORIDE 25 MG/1
12.5-25 TABLET ORAL EVERY 4 HOURS PRN
Status: DISCONTINUED | OUTPATIENT
Start: 2025-01-15 | End: 2025-01-17 | Stop reason: HOSPADM

## 2025-01-15 RX ADMIN — RIFAXIMIN 550 MG: 550 TABLET ORAL at 21:08

## 2025-01-15 RX ADMIN — LACTULOSE 30 ML: 10 SOLUTION ORAL; RECTAL at 19:02

## 2025-01-15 ASSESSMENT — COGNITIVE AND FUNCTIONAL STATUS - GENERAL
SUGGESTED CMS G CODE MODIFIER MOBILITY: CM
MOVING TO AND FROM BED TO CHAIR: TOTAL
EATING MEALS: A LOT
MOVING FROM LYING ON BACK TO SITTING ON SIDE OF FLAT BED: TOTAL
STANDING UP FROM CHAIR USING ARMS: A LOT
DRESSING REGULAR LOWER BODY CLOTHING: A LOT
TURNING FROM BACK TO SIDE WHILE IN FLAT BAD: A LOT
DRESSING REGULAR UPPER BODY CLOTHING: A LOT
PERSONAL GROOMING: A LOT
DAILY ACTIVITIY SCORE: 12
TOILETING: A LOT
HELP NEEDED FOR BATHING: A LOT
WALKING IN HOSPITAL ROOM: TOTAL
CLIMB 3 TO 5 STEPS WITH RAILING: TOTAL
MOBILITY SCORE: 8
SUGGESTED CMS G CODE MODIFIER DAILY ACTIVITY: CL

## 2025-01-15 ASSESSMENT — FIBROSIS 4 INDEX
FIB4 SCORE: 8.11
FIB4 SCORE: 5.56

## 2025-01-15 NOTE — ED TRIAGE NOTES
"Chief Complaint   Patient presents with    ALOC     Per [facility] staff - LKW was sometime yesterday, unclear what time. Today staff noted patient to be acutely altered, normally Aox3 at baseline. FSBG 138, Patient GCS 10 on arrival, not following commands and nonverbal.     65 yo female bib EMS from [facility] for above. ERP called to bedside. Patient unable to follow commands, and only moans when attempting to move extremities.     No interventions per EMS.    BP (!) 181/98   Pulse 72   Temp 36.8 °C (98.3 °F) (Temporal)   Resp 16   Ht 1.727 m (5' 8\")   Wt 93 kg (205 lb)   LMP 06/02/2008   SpO2 99%   BMI 31.17 kg/m²     "

## 2025-01-15 NOTE — ED PROVIDER NOTES
ED Provider Note  CHIEF COMPLAINT  Chief Complaint   Patient presents with    ALOC     Per [facility] staff - LKW was sometime yesterday, unclear what time. Today staff noted patient to be acutely altered, normally Aox3 at baseline. FSBG 138, Patient GCS 10 on arrival, not following commands and nonverbal.       HPI  April Alicia is a 64 y.o. female who presents for evaluation of altered level consciousness.  Patient was apparently in her normal state of health yesterday sometime during the day, according to EMS who spoke with staff, but they found her this afternoon altered.  Unclear whether staff had seen the patient this morning although there was no indication of this from EMS.  Patient is apparently normally alert and oriented x 3 but is not making any attempt at following commands or verbal response today.  EXTERNAL RECORDS REVIEWED  Reviewed last ED visit in December  ROS  Unable to obtain due to clinical condition        LIMITATION TO HISTORY   Encephalopathic  OUTSIDE HISTORIAN(S):  EMS        PAST FAM HISTORY  Family History   Problem Relation Age of Onset    Diabetes Mother     Seizures Father     Heart Attack Father 45    Diabetes Brother     Alcohol abuse Brother     Dementia Brother     Diabetes Brother        PAST MEDICAL HISTORY   has a past medical history of Abnormal finding of lung (12/27/2022), Acute exacerbation of chronic obstructive pulmonary disease (COPD) ([facility]) (01/27/2020), Alcoholic cirrhosis (HCC), Arthritis, ASTHMA, Atrial fibrillation ([facility]), Dry eyes (11/16/2017), Edentulous, Emphysema of lung ([facility]), H/O: hysterectomy (12/05/2019), Heart burn, Hepatic encephalopathy (HCC) (12/27/2022), Hepatitis C infection (07/28/2022), History of rheumatic fever (09/04/2017), Hypertension, 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HCC), Medication overuse headache (08/15/2017), Mitral regurgitation, Pancytopenia (HCC) (07/26/2022), Pneumonia (02/2020), Primary osteoarthritis of left knee (08/25/2020),  Prosthetic joint infection (HCC) (2018), Protein-calorie malnutrition, severe (HCC) (2022), Right leg DVT ([facility]) (2018), Seizure disorder (HCC), Septic arthritis of knee, left (HCC) (2022), Septic arthritis of shoulder, left (HCC) (2022), Septic shock due to Pseudomonas species ([facility]) (10/30/2023), and Type 2 diabetes mellitus ([facility]) (2022).    SOCIAL HISTORY  Social History     Tobacco Use    Smoking status: Former     Current packs/day: 0.00     Types: Cigarettes     Quit date: 2016     Years since quittin.0    Smokeless tobacco: Former     Quit date: 2021    Tobacco comments:     a few a day when I can   Vaping Use    Vaping status: Never Used   Substance and Sexual Activity    Alcohol use: Not Currently     Comment: hx of AUD    Drug use: Not Currently    Sexual activity: Not Currently     Partners: Male     Birth control/protection: Post-Menopausal     Comment: Has boyfriend, not currently sexually active       SURGICAL HISTORY   has a past surgical history that includes gyn surgery (); gyn surgery (); colonoscopy with clipping (10/28/2015); colonoscopy with sclerotherapy (10/28/2015); colonoscopy with tattooing (10/28/2015); irrigation & debridement ortho (Right, 9/3/2017); knee arthroplasty total (Right, 2018); total knee arthroplasty (Left, 2020); irrigation & debridement general (Left, 2022); revise knee joint replace,all parts (Left, 2022); revise knee joint replace,all parts (Left, 2022); irrigation & debridement general (Left, 2022); total knee arthroplasty (Left, 10/30/2023); knee amputation above (Left, 2024); and upper gi endoscopy,diagnosis (N/A, 2024).    CURRENT MEDICATIONS  Home Medications       Reviewed by Meg Thompson R.N. (Registered Nurse) on 01/15/25 at 1531  Med List Status: <None>     Medication Last Dose Status   acetaminophen (TYLENOL) 325 MG Tab  Active   albuterol 108 (90 Base) MCG/ACT Aero Soln  "inhalation aerosol  Active   ascorbic acid (ASCORBIC ACID) 500 MG Tab  Active   atorvastatin (LIPITOR) 20 MG Tab  Active   bisacodyl (DULCOLAX) 10 MG Suppos  Active   carvedilol (COREG) 3.125 MG Tab  Active   Dextrose, Diabetic Use, (GLUCOSE) 77.4 % Gel  Active   ferrous sulfate 325 (65 Fe) MG tablet  Active   furosemide (LASIX) 40 MG Tab  Active   gabapentin (NEURONTIN) 400 MG Cap  Active   glucagon 1 mg Recon Soln  Active   insulin regular (HUMULIN R/NOVOLIN R) 100 Unit/mL Solution  Active   lactulose 20 GM/30ML Solution  Active   magnesium hydroxide (MILK OF MAGNESIA) 400 MG/5ML Suspension  Active   magnesium oxide (MAG-OX) 400 MG Tab tablet  Active   pantoprazole (PROTONIX) 20 MG tablet  Active   spironolactone (ALDACTONE) 50 MG Tab  Active                  Audit from Redirected Encounters    **Home medications have not yet been reviewed for this encounter**          ALLERGIES  Allergies   Allergen Reactions    Meropenem Swelling     Pt had rxt to meropenem June 2024 (marked tongue swelling and right facial/periorbital edema)    Tuberculin Tests Unspecified     Per MAR from [facility]       PHYSICAL EXAM  VITAL SIGNS: BP (!) 162/103   Pulse 74   Temp 36.8 °C (98.3 °F) (Temporal)   Resp 16   Ht 1.727 m (5' 8\")   Wt 93 kg (205 lb)   LMP 06/02/2008   SpO2 97%   BMI 31.17 kg/m²    Gen: Eyes open, appears groggy, makes eye contact briefly, makes no attempted verbal reply, does not follow commands  HEENT: No signs of trauma, Bilateral external ears normal, Nose normal. Conjunctiva normal, Non-icteric.   Neck:  No tenderness, Supple, No masses  Lymphatic: No cervical lymphadenopathy noted.   Cardiovascular: Regular rate and rhythm.  Capillary refill less than 3 seconds to all extremities, 2+ distal pulses.  Thorax & Lungs: Normal breath sounds, No respiratory distress, No wheezing bilateral chest rise  Abdomen: Bowel sounds normal, Soft, No apparent tenderness  Skin: Warm, Dry, No erythema, No rash noted to " exposed areas.   Back: No lesions  Extremities: Intact distal pulses, No edema  Neurologic: Appears to move all existing extremities to noxious stimuli  Psychiatric: GCS 12    INITIAL IMPRESSION  Patient arrives for evaluation of what appears to be encephalopathy of unclear origin.  Patient has no signs of trauma and does not have any discernible neurologic deficits.  She does respond by moving all extremities to noxious stimuli and makes incomprehensible noises with this stimuli as well.  She appears to be maintaining her airway and I do not feel intubation is necessary given the findings today.  Review of the patient's chart indicates she has had a history of hepatic encephalopathy but she will also need CT imaging to ensure she does not have intracranial hemorrhage or CVA.    ED observation? No    LABS  Results for orders placed or performed during the hospital encounter of 01/15/25   EKG    Collection Time: 01/15/25  3:23 PM   Result Value Ref Range    Report       [facility]    Test Date:  2025-01-15  Pt Name:    FRANCISCA TURNER          Department: ER  MRN:        [id number]                      Room:       [facility]  Gender:     Female                       Technician:  :        [id number]                   Requested By:ER TRIAGE PROTOCOL  Order #:    [id number]                    Reading MD:    Measurements  Intervals                                Axis  Rate:       67                           P:          0  ID:         0                            QRS:        53  QRSD:       99                           T:          30  QT:         564  QTc:        596    Interpretive Statements  Atrial fibrillation  Borderline T abnormalities, anterior leads  Prolonged QT interval  Compared to ECG 2024 01:46:01  T-wave abnormality now present  Myocardial infarct finding no longer present     AMMONIA    Collection Time: 01/15/25  3:43 PM   Result Value Ref Range    Ammonia 85 (H) 11  - 45 umol/L   CBC WITH DIFFERENTIAL    Collection Time: 01/15/25  3:48 PM   Result Value Ref Range    WBC 4.1 (L) 4.8 - 10.8 K/uL    RBC 3.88 (L) 4.20 - 5.40 M/uL    Hemoglobin 11.1 (L) 12.0 - 16.0 g/dL    Hematocrit 34.8 (L) 37.0 - 47.0 %    MCV 89.7 81.4 - 97.8 fL    MCH 28.6 27.0 - 33.0 pg    MCHC 31.9 (L) 32.2 - 35.5 g/dL    RDW 51.3 (H) 35.9 - 50.0 fL    Platelet Count 66 (L) 164 - 446 K/uL    MPV 9.9 9.0 - 12.9 fL    Neutrophils-Polys 59.30 44.00 - 72.00 %    Lymphocytes 27.40 22.00 - 41.00 %    Monocytes 7.50 0.00 - 13.40 %    Eosinophils 4.10 0.00 - 6.90 %    Basophils 1.50 0.00 - 1.80 %    Immature Granulocytes 0.20 0.00 - 0.90 %    Nucleated RBC 0.00 0.00 - 0.20 /100 WBC    Neutrophils (Absolute) 2.44 1.82 - 7.42 K/uL    Lymphs (Absolute) 1.13 1.00 - 4.80 K/uL    Monos (Absolute) 0.31 0.00 - 0.85 K/uL    Eos (Absolute) 0.17 0.00 - 0.51 K/uL    Baso (Absolute) 0.06 0.00 - 0.12 K/uL    Immature Granulocytes (abs) 0.01 0.00 - 0.11 K/uL    NRBC (Absolute) 0.00 K/uL   COMP METABOLIC PANEL    Collection Time: 01/15/25  3:48 PM   Result Value Ref Range    Sodium 139 135 - 145 mmol/L    Potassium 4.3 3.6 - 5.5 mmol/L    Chloride 109 96 - 112 mmol/L    Co2 18 (L) 20 - 33 mmol/L    Anion Gap 12.0 7.0 - 16.0    Glucose 114 (H) 65 - 99 mg/dL    Bun 29 (H) 8 - 22 mg/dL    Creatinine 0.81 0.50 - 1.40 mg/dL    Calcium 8.7 8.5 - 10.5 mg/dL    Correct Calcium 9.7 8.5 - 10.5 mg/dL    AST(SGOT) 19 12 - 45 U/L    ALT(SGPT) 11 2 - 50 U/L    Alkaline Phosphatase 186 (H) 30 - 99 U/L    Total Bilirubin 1.2 0.1 - 1.5 mg/dL    Albumin 2.8 (L) 3.2 - 4.9 g/dL    Total Protein 7.8 6.0 - 8.2 g/dL    Globulin 5.0 (H) 1.9 - 3.5 g/dL    A-G Ratio 0.6 g/dL   TROPONIN    Collection Time: 01/15/25  3:48 PM   Result Value Ref Range    Troponin T 20 (H) 6 - 19 ng/L   LACTIC ACID    Collection Time: 01/15/25  3:48 PM   Result Value Ref Range    Lactic Acid 1.3 0.5 - 2.0 mmol/L   BLOOD CULTURE    Collection Time: 01/15/25  3:48 PM    Specimen:  Peripheral; Blood   Result Value Ref Range    Significant Indicator NEG     Source BLD     Site PERIPHERAL     Culture Result       No Growth  Note: Blood cultures are incubated for 5 days and  are monitored continuously.Positive blood cultures  are called to the RN and reported as soon as  they are identified.     PROTHROMBIN TIME (INR)    Collection Time: 01/15/25  3:48 PM   Result Value Ref Range    PT 17.1 (H) 12.0 - 14.6 sec    INR 1.39 (H) 0.87 - 1.13   APTT    Collection Time: 01/15/25  3:48 PM   Result Value Ref Range    APTT 37.2 (H) 24.7 - 36.0 sec   MAGNESIUM    Collection Time: 01/15/25  3:48 PM   Result Value Ref Range    Magnesium 1.8 1.5 - 2.5 mg/dL   PROCALCITONIN    Collection Time: 01/15/25  3:48 PM   Result Value Ref Range    Procalcitonin 0.06 <0.25 ng/mL   ESTIMATED GFR    Collection Time: 01/15/25  3:48 PM   Result Value Ref Range    GFR (CKD-EPI) 81 >60 mL/min/1.73 m 2   PLATELET ESTIMATE    Collection Time: 01/15/25  3:48 PM   Result Value Ref Range    Plt Estimation Decreased    MORPHOLOGY    Collection Time: 01/15/25  3:48 PM   Result Value Ref Range    RBC Morphology Present     Poikilocytosis 2+     Ovalocytes 1+     Echinocytes 1+    PERIPHERAL SMEAR REVIEW    Collection Time: 01/15/25  3:48 PM   Result Value Ref Range    Peripheral Smear Review see below    IMMATURE PLT FRACTION    Collection Time: 01/15/25  3:48 PM   Result Value Ref Range    Imm. Plt Fraction 1.5 0.6 - 13.1 %   DIFFERENTIAL COMMENT    Collection Time: 01/15/25  3:48 PM   Result Value Ref Range    Comments-Diff see below    BLOOD CULTURE    Collection Time: 01/15/25  3:57 PM    Specimen: Peripheral; Blood   Result Value Ref Range    Significant Indicator NEG     Source BLD     Site PERIPHERAL     Culture Result       No Growth  Note: Blood cultures are incubated for 5 days and  are monitored continuously.Positive blood cultures  are called to the RN and reported as soon as  they are identified.     URINALYSIS CULTURE,  IF INDICATED    Collection Time: 01/15/25  4:49 PM    Specimen: Urine, Straight Cath   Result Value Ref Range    Color Yellow     Character Clear     Specific Gravity 1.011 <1.035    Ph 7.5 5.0 - 8.0    Glucose Negative Negative mg/dL    Ketones Negative Negative mg/dL    Protein 30 (A) Negative mg/dL    Bilirubin Negative Negative    Urobilinogen, Urine 0.2 <=1.0 EU/dL    Nitrite Negative Negative    Leukocyte Esterase Negative Negative    Occult Blood Large (A) Negative    Micro Urine Req Microscopic    URINE MICROSCOPIC (W/UA)    Collection Time: 01/15/25  4:49 PM   Result Value Ref Range    WBC 0-2 /hpf    RBC 21-50 (A) 0 - 2 /hpf    Bacteria None Seen None /hpf    Epithelial Cells 0-2 0 - 5 /hpf    Urine Casts 0-2 0 - 2 /lpf     *Note: Due to a large number of results and/or encounters for the requested time period, some results have not been displayed. A complete set of results can be found in Results Review.     I have independently interpreted this EKG  Irregularly irregular narrow complex rhythm at a rate of 67.  This appears to be atrial fibrillation.  QTc is prolonged.  There are no convincing ST or T wave changes to suggest acute ischemia.  Compared to an EKG performed December 18, 2024, there are no convincing changes  I have independently interpreted the diagnostic imaging associated with this visit and am waiting the final reading from the radiologist.   My preliminary interpretation is a follows: Single view chest x-ray: There is poor inspiratory effort and some rotation hampering interpretation.  There are no convincing lobar opacities to suggest infiltrates or effusions.  Cardiomediastinal silhouette appears enlarged.  RADIOLOGY  CT-HEAD W/O   Final Result      1. No acute intracranial abnormality.   2. Atrophy and diffuse chronic microangiopathic white matter changes versus demyelination or gliosis.   3. Atherosclerosis.               DX-CHEST-PORTABLE (1 VIEW)   Final Result      1. Diffuse  interstitial opacities throughout the lungs, compatible with interstitial edema and/or pneumonitis.   2. Stable cardiomegaly and calcified mediastinal lymph nodes.   3. No other acute findings.            ASSESSMENT, COURSE AND PLAN  Care Narrative: 6:40 PM  Reevaluated patient at bedside.  She is sleeping in right lateral recumbent position and wakes to voice.  She mumbles incomprehensibly but appears to be trying to answer which is an improvement from her arrival.  She is moving her extremities spontaneously and purposefully.  She still appears encephalopathic however.  Her ammonia level is elevated although not as high as it has been in the past.  This appears to be the only reasonable explanation for the patient's encephalopathy and, feel it is reasonable to start empiric lactulose and admit the patient for observation and empiric treatment of hepatic encephalopathy.  Patient was discussed with the hospitalist.        I have discussed management of the patient with the following physicians and CANDIDO's:     Escalation of care considered, and ultimately not performed: None    Barriers to care at this time, including but not limited to: Encephalopathy.     Decision tools and Rx drugs considered including, but not limited to : Lactulose    Discussion of management with other Q or appropriate source(s): None        FINAL IMPRESSION  1. Metabolic encephalopathy    2. Hyperammonemia (HCC)        Electronically signed by: Eduardo López M.D., 1/15/2025 3:45 PM

## 2025-01-16 PROBLEM — J45.909 ASTHMA: Status: ACTIVE | Noted: 2025-01-16

## 2025-01-16 LAB
ALBUMIN SERPL BCP-MCNC: 2.5 G/DL (ref 3.2–4.9)
ALBUMIN/GLOB SERPL: 0.6 G/DL
ALP SERPL-CCNC: 156 U/L (ref 30–99)
ALT SERPL-CCNC: 11 U/L (ref 2–50)
AMMONIA PLAS-SCNC: 112 UMOL/L (ref 11–45)
ANION GAP SERPL CALC-SCNC: 8 MMOL/L (ref 7–16)
AST SERPL-CCNC: 24 U/L (ref 12–45)
BASOPHILS # BLD AUTO: 1 % (ref 0–1.8)
BASOPHILS # BLD: 0.04 K/UL (ref 0–0.12)
BILIRUB SERPL-MCNC: 1.3 MG/DL (ref 0.1–1.5)
BUN SERPL-MCNC: 28 MG/DL (ref 8–22)
CALCIUM ALBUM COR SERPL-MCNC: 9.8 MG/DL (ref 8.5–10.5)
CALCIUM SERPL-MCNC: 8.6 MG/DL (ref 8.5–10.5)
CHLORIDE SERPL-SCNC: 115 MMOL/L (ref 96–112)
CO2 SERPL-SCNC: 18 MMOL/L (ref 20–33)
CREAT SERPL-MCNC: 0.85 MG/DL (ref 0.5–1.4)
EOSINOPHIL # BLD AUTO: 0.05 K/UL (ref 0–0.51)
EOSINOPHIL NFR BLD: 1.3 % (ref 0–6.9)
ERYTHROCYTE [DISTWIDTH] IN BLOOD BY AUTOMATED COUNT: 50.5 FL (ref 35.9–50)
GFR SERPLBLD CREATININE-BSD FMLA CKD-EPI: 76 ML/MIN/1.73 M 2
GLOBULIN SER CALC-MCNC: 4.4 G/DL (ref 1.9–3.5)
GLUCOSE BLD STRIP.AUTO-MCNC: 107 MG/DL (ref 65–99)
GLUCOSE BLD STRIP.AUTO-MCNC: 118 MG/DL (ref 65–99)
GLUCOSE BLD STRIP.AUTO-MCNC: 132 MG/DL (ref 65–99)
GLUCOSE BLD STRIP.AUTO-MCNC: 134 MG/DL (ref 65–99)
GLUCOSE BLD STRIP.AUTO-MCNC: 150 MG/DL (ref 65–99)
GLUCOSE SERPL-MCNC: 117 MG/DL (ref 65–99)
HCT VFR BLD AUTO: 30.9 % (ref 37–47)
HGB BLD-MCNC: 10 G/DL (ref 12–16)
IMM GRANULOCYTES # BLD AUTO: 0.01 K/UL (ref 0–0.11)
IMM GRANULOCYTES NFR BLD AUTO: 0.3 % (ref 0–0.9)
LYMPHOCYTES # BLD AUTO: 0.96 K/UL (ref 1–4.8)
LYMPHOCYTES NFR BLD: 24.1 % (ref 22–41)
MAGNESIUM SERPL-MCNC: 1.8 MG/DL (ref 1.5–2.5)
MCH RBC QN AUTO: 28.4 PG (ref 27–33)
MCHC RBC AUTO-ENTMCNC: 32.4 G/DL (ref 32.2–35.5)
MCV RBC AUTO: 87.8 FL (ref 81.4–97.8)
MONOCYTES # BLD AUTO: 0.29 K/UL (ref 0–0.85)
MONOCYTES NFR BLD AUTO: 7.3 % (ref 0–13.4)
NEUTROPHILS # BLD AUTO: 2.64 K/UL (ref 1.82–7.42)
NEUTROPHILS NFR BLD: 66 % (ref 44–72)
NRBC # BLD AUTO: 0 K/UL
NRBC BLD-RTO: 0 /100 WBC (ref 0–0.2)
PHOSPHATE SERPL-MCNC: 3.5 MG/DL (ref 2.5–4.5)
PLATELET # BLD AUTO: 61 K/UL (ref 164–446)
PLATELETS.RETICULATED NFR BLD AUTO: 1.2 % (ref 0.6–13.1)
PMV BLD AUTO: 11.5 FL (ref 9–12.9)
POTASSIUM SERPL-SCNC: 4 MMOL/L (ref 3.6–5.5)
PROT SERPL-MCNC: 6.9 G/DL (ref 6–8.2)
RBC # BLD AUTO: 3.52 M/UL (ref 4.2–5.4)
SODIUM SERPL-SCNC: 141 MMOL/L (ref 135–145)
TROPONIN T SERPL-MCNC: 21 NG/L (ref 6–19)
WBC # BLD AUTO: 4 K/UL (ref 4.8–10.8)

## 2025-01-16 PROCEDURE — 82962 GLUCOSE BLOOD TEST: CPT

## 2025-01-16 PROCEDURE — 83735 ASSAY OF MAGNESIUM: CPT

## 2025-01-16 PROCEDURE — 36415 COLL VENOUS BLD VENIPUNCTURE: CPT

## 2025-01-16 PROCEDURE — A9270 NON-COVERED ITEM OR SERVICE: HCPCS | Performed by: STUDENT IN AN ORGANIZED HEALTH CARE EDUCATION/TRAINING PROGRAM

## 2025-01-16 PROCEDURE — 770006 HCHG ROOM/CARE - MED/SURG/GYN SEMI*

## 2025-01-16 PROCEDURE — 700102 HCHG RX REV CODE 250 W/ 637 OVERRIDE(OP): Performed by: STUDENT IN AN ORGANIZED HEALTH CARE EDUCATION/TRAINING PROGRAM

## 2025-01-16 PROCEDURE — 80053 COMPREHEN METABOLIC PANEL: CPT

## 2025-01-16 PROCEDURE — 700111 HCHG RX REV CODE 636 W/ 250 OVERRIDE (IP): Performed by: STUDENT IN AN ORGANIZED HEALTH CARE EDUCATION/TRAINING PROGRAM

## 2025-01-16 PROCEDURE — 82140 ASSAY OF AMMONIA: CPT

## 2025-01-16 PROCEDURE — 84100 ASSAY OF PHOSPHORUS: CPT

## 2025-01-16 PROCEDURE — 84484 ASSAY OF TROPONIN QUANT: CPT

## 2025-01-16 PROCEDURE — 700105 HCHG RX REV CODE 258: Performed by: STUDENT IN AN ORGANIZED HEALTH CARE EDUCATION/TRAINING PROGRAM

## 2025-01-16 PROCEDURE — 85025 COMPLETE CBC W/AUTO DIFF WBC: CPT

## 2025-01-16 PROCEDURE — 85055 RETICULATED PLATELET ASSAY: CPT

## 2025-01-16 PROCEDURE — 99233 SBSQ HOSP IP/OBS HIGH 50: CPT | Performed by: STUDENT IN AN ORGANIZED HEALTH CARE EDUCATION/TRAINING PROGRAM

## 2025-01-16 RX ORDER — ASCORBIC ACID 500 MG
500 TABLET ORAL EVERY MORNING
Status: DISCONTINUED | OUTPATIENT
Start: 2025-01-16 | End: 2025-01-17 | Stop reason: HOSPADM

## 2025-01-16 RX ORDER — ATORVASTATIN CALCIUM 20 MG/1
20 TABLET, FILM COATED ORAL NIGHTLY
Status: DISCONTINUED | OUTPATIENT
Start: 2025-01-16 | End: 2025-01-17 | Stop reason: HOSPADM

## 2025-01-16 RX ORDER — ALBUTEROL SULFATE 90 UG/1
2 INHALANT RESPIRATORY (INHALATION) EVERY 4 HOURS PRN
Status: DISCONTINUED | OUTPATIENT
Start: 2025-01-16 | End: 2025-01-17 | Stop reason: HOSPADM

## 2025-01-16 RX ORDER — OXYCODONE HYDROCHLORIDE 10 MG/1
10 TABLET ORAL EVERY 6 HOURS PRN
Status: DISCONTINUED | OUTPATIENT
Start: 2025-01-16 | End: 2025-01-17 | Stop reason: HOSPADM

## 2025-01-16 RX ORDER — CARVEDILOL 6.25 MG/1
6.25 TABLET ORAL 2 TIMES DAILY WITH MEALS
Status: DISCONTINUED | OUTPATIENT
Start: 2025-01-16 | End: 2025-01-17 | Stop reason: HOSPADM

## 2025-01-16 RX ORDER — MONTELUKAST SODIUM 10 MG/1
10 TABLET ORAL NIGHTLY
Status: DISCONTINUED | OUTPATIENT
Start: 2025-01-16 | End: 2025-01-17 | Stop reason: HOSPADM

## 2025-01-16 RX ORDER — FERROUS SULFATE 325(65) MG
325 TABLET ORAL
Status: DISCONTINUED | OUTPATIENT
Start: 2025-01-16 | End: 2025-01-17 | Stop reason: HOSPADM

## 2025-01-16 RX ORDER — GABAPENTIN 400 MG/1
400 CAPSULE ORAL 2 TIMES DAILY
Status: DISCONTINUED | OUTPATIENT
Start: 2025-01-16 | End: 2025-01-17 | Stop reason: HOSPADM

## 2025-01-16 RX ORDER — IPRATROPIUM BROMIDE AND ALBUTEROL SULFATE 2.5; .5 MG/3ML; MG/3ML
3 SOLUTION RESPIRATORY (INHALATION)
Status: DISCONTINUED | OUTPATIENT
Start: 2025-01-16 | End: 2025-01-17 | Stop reason: HOSPADM

## 2025-01-16 RX ORDER — FUROSEMIDE 40 MG/1
40 TABLET ORAL DAILY
Status: DISCONTINUED | OUTPATIENT
Start: 2025-01-16 | End: 2025-01-17 | Stop reason: HOSPADM

## 2025-01-16 RX ORDER — MAGNESIUM SULFATE 1 G/100ML
1 INJECTION INTRAVENOUS ONCE
Status: COMPLETED | OUTPATIENT
Start: 2025-01-16 | End: 2025-01-16

## 2025-01-16 RX ORDER — INSULIN LISPRO 100 [IU]/ML
1-6 INJECTION, SOLUTION INTRAVENOUS; SUBCUTANEOUS EVERY 6 HOURS
Status: DISCONTINUED | OUTPATIENT
Start: 2025-01-16 | End: 2025-01-17 | Stop reason: HOSPADM

## 2025-01-16 RX ORDER — SPIRONOLACTONE 50 MG/1
50 TABLET, FILM COATED ORAL DAILY
Status: DISCONTINUED | OUTPATIENT
Start: 2025-01-16 | End: 2025-01-17 | Stop reason: HOSPADM

## 2025-01-16 RX ORDER — DEXTROSE MONOHYDRATE 25 G/50ML
25 INJECTION, SOLUTION INTRAVENOUS
Status: DISCONTINUED | OUTPATIENT
Start: 2025-01-16 | End: 2025-01-17 | Stop reason: HOSPADM

## 2025-01-16 RX ADMIN — RIFAXIMIN 550 MG: 550 TABLET ORAL at 05:11

## 2025-01-16 RX ADMIN — GABAPENTIN 400 MG: 400 CAPSULE ORAL at 18:13

## 2025-01-16 RX ADMIN — GABAPENTIN 400 MG: 400 CAPSULE ORAL at 05:12

## 2025-01-16 RX ADMIN — SPIRONOLACTONE 50 MG: 50 TABLET ORAL at 05:12

## 2025-01-16 RX ADMIN — MAGNESIUM SULFATE IN DEXTROSE 1 G: 10 INJECTION, SOLUTION INTRAVENOUS at 03:44

## 2025-01-16 RX ADMIN — OXYCODONE HYDROCHLORIDE 10 MG: 10 TABLET ORAL at 21:06

## 2025-01-16 RX ADMIN — OXYCODONE HYDROCHLORIDE AND ACETAMINOPHEN 500 MG: 500 TABLET ORAL at 05:11

## 2025-01-16 RX ADMIN — OMEPRAZOLE 20 MG: 20 CAPSULE, DELAYED RELEASE ORAL at 18:13

## 2025-01-16 RX ADMIN — OXYCODONE HYDROCHLORIDE 10 MG: 10 TABLET ORAL at 02:20

## 2025-01-16 RX ADMIN — RIFAXIMIN 550 MG: 550 TABLET ORAL at 18:13

## 2025-01-16 RX ADMIN — THIAMINE HYDROCHLORIDE 500 MG: 100 INJECTION, SOLUTION INTRAMUSCULAR; INTRAVENOUS at 02:57

## 2025-01-16 RX ADMIN — OXYCODONE HYDROCHLORIDE 10 MG: 10 TABLET ORAL at 14:34

## 2025-01-16 RX ADMIN — LACTULOSE 45 ML: 10 SOLUTION ORAL at 12:43

## 2025-01-16 RX ADMIN — OMEPRAZOLE 20 MG: 20 CAPSULE, DELAYED RELEASE ORAL at 05:11

## 2025-01-16 RX ADMIN — LACTULOSE 45 ML: 10 SOLUTION ORAL at 18:13

## 2025-01-16 RX ADMIN — THIAMINE HYDROCHLORIDE 500 MG: 100 INJECTION, SOLUTION INTRAMUSCULAR; INTRAVENOUS at 05:28

## 2025-01-16 RX ADMIN — LACTULOSE 45 ML: 10 SOLUTION ORAL at 05:22

## 2025-01-16 RX ADMIN — MONTELUKAST 10 MG: 10 TABLET, FILM COATED ORAL at 02:20

## 2025-01-16 RX ADMIN — ATORVASTATIN CALCIUM 20 MG: 20 TABLET, FILM COATED ORAL at 21:19

## 2025-01-16 RX ADMIN — CARVEDILOL 6.25 MG: 6.25 TABLET, FILM COATED ORAL at 18:13

## 2025-01-16 RX ADMIN — THIAMINE HYDROCHLORIDE 500 MG: 100 INJECTION, SOLUTION INTRAMUSCULAR; INTRAVENOUS at 12:41

## 2025-01-16 RX ADMIN — MONTELUKAST 10 MG: 10 TABLET, FILM COATED ORAL at 21:19

## 2025-01-16 RX ADMIN — FUROSEMIDE 40 MG: 40 TABLET ORAL at 05:11

## 2025-01-16 RX ADMIN — CARVEDILOL 6.25 MG: 6.25 TABLET, FILM COATED ORAL at 09:42

## 2025-01-16 RX ADMIN — FERROUS SULFATE TAB 325 MG (65 MG ELEMENTAL FE) 325 MG: 325 (65 FE) TAB at 21:19

## 2025-01-16 ASSESSMENT — LIFESTYLE VARIABLES
HOW MANY TIMES IN THE PAST YEAR HAVE YOU HAD 5 OR MORE DRINKS IN A DAY: 0
TOTAL SCORE: 0
DOES PATIENT WANT TO STOP DRINKING: NO
CONSUMPTION TOTAL: NEGATIVE
TOTAL SCORE: 0
EVER HAD A DRINK FIRST THING IN THE MORNING TO STEADY YOUR NERVES TO GET RID OF A HANGOVER: NO
ALCOHOL_USE: NO
AVERAGE NUMBER OF DAYS PER WEEK YOU HAVE A DRINK CONTAINING ALCOHOL: 0
HAVE PEOPLE ANNOYED YOU BY CRITICIZING YOUR DRINKING: NO
TOTAL SCORE: 0
HAVE YOU EVER FELT YOU SHOULD CUT DOWN ON YOUR DRINKING: NO
ON A TYPICAL DAY WHEN YOU DRINK ALCOHOL HOW MANY DRINKS DO YOU HAVE: 0
EVER FELT BAD OR GUILTY ABOUT YOUR DRINKING: NO

## 2025-01-16 ASSESSMENT — PAIN DESCRIPTION - PAIN TYPE
TYPE: ACUTE PAIN

## 2025-01-16 ASSESSMENT — ENCOUNTER SYMPTOMS
SHORTNESS OF BREATH: 0
NAUSEA: 0
ABDOMINAL PAIN: 0
VOMITING: 0
FEVER: 0

## 2025-01-16 ASSESSMENT — SOCIAL DETERMINANTS OF HEALTH (SDOH)
IN THE PAST 12 MONTHS, HAS THE ELECTRIC, GAS, OIL, OR WATER COMPANY THREATENED TO SHUT OFF SERVICE IN YOUR HOME?: NO
WITHIN THE LAST YEAR, HAVE TO BEEN RAPED OR FORCED TO HAVE ANY KIND OF SEXUAL ACTIVITY BY YOUR PARTNER OR EX-PARTNER?: NO
WITHIN THE PAST 12 MONTHS, YOU WORRIED THAT YOUR FOOD WOULD RUN OUT BEFORE YOU GOT THE MONEY TO BUY MORE: NEVER TRUE
WITHIN THE LAST YEAR, HAVE YOU BEEN AFRAID OF YOUR PARTNER OR EX-PARTNER?: NO
WITHIN THE PAST 12 MONTHS, THE FOOD YOU BOUGHT JUST DIDN'T LAST AND YOU DIDN'T HAVE MONEY TO GET MORE: NEVER TRUE
WITHIN THE LAST YEAR, HAVE YOU BEEN KICKED, HIT, SLAPPED, OR OTHERWISE PHYSICALLY HURT BY YOUR PARTNER OR EX-PARTNER?: NO
WITHIN THE LAST YEAR, HAVE YOU BEEN HUMILIATED OR EMOTIONALLY ABUSED IN OTHER WAYS BY YOUR PARTNER OR EX-PARTNER?: NO

## 2025-01-16 NOTE — ED NOTES
Bedside report received from previous shift.   Assumed patient care. Verified patient identification.  Checked on bed, connected to monitor,  with unlabored respirations. Discussed plan of care.   Vital signs is stable. Denied any new complaints.   Gurney in low position, side rail up for pt safety. Call light within reach.   No needs identified at the moment. Instructed to use call light when needed.    Contraptions: Iv gauge 20 Rt FA  Alert and Oriented: x 2, disoriented to situation and time  Ambulatory: Not at the moment/ Lt AKA  Oxygen Treatment: No  Pending: Admission

## 2025-01-16 NOTE — ED NOTES
Admission report given to the assigned RN in  S625/02   for continuity of care and management.  Provided opportunity to asks questions.  All pt belongings at bedside

## 2025-01-16 NOTE — CARE PLAN
The patient is Stable - Low risk of patient condition declining or worsening    Shift Goals  Clinical Goals: Pt very sleeping this morning and unable to assess.  Patient Goals: Unable to assess  Family Goals: Family not present    Progress made toward(s) clinical / shift goals:      Problem: Knowledge Deficit - Standard  Goal: Patient and family/care givers will demonstrate understanding of plan of care, disease process/condition, diagnostic tests and medications  Outcome: Progressing     Problem: Pain - Standard  Goal: Alleviation of pain or a reduction in pain to the patient’s comfort goal  Outcome: Progressing       Pt very lethargic this morning. She was oriented to only self. Tried to wake pt up for medication and she would only stay awake for a few minutes. Attempted to give breakfast but too lethargic. Pt awake and alert this afternoon but forgetful. Multiple family members at beside during the day. Pt needs to have at least 3 BMs today. Pt reported pain, pain medication given per MAR.

## 2025-01-16 NOTE — ASSESSMENT & PLAN NOTE
A1C 5.1   - Continue sliding scale insulin; has not required insulin since admission  - Monitor BG trend.   - Accu-Cheks before meals and at bedtime.   - Goal to keep BG between 140-180 per 2019 ADA guidelines

## 2025-01-16 NOTE — PROGRESS NOTES
4 Eyes Skin Assessment Completed by DANYEL Shaikh and DANYEL Bartlett.    Head WDL  Ears Redness on back of Rt ear  Nose WDL  Mouth WDL  Neck WDL  Breast/Chest WDL  Shoulder Blades WDL  Spine WDL  (R) Arm/Elbow/Hand Non blanching Bruising and Discoloration  (L) Arm/Elbow/Hand Redness, Blanching, Bruising, Scab, and Discoloration  Abdomen WDL  Groin Rash  Scrotum/Coccyx/Buttocks Redness, open area, scarred tissue  (R) Leg Scar  (L) Leg Lt AKA  (R) Heel/Foot/Toe Redness and Blanching  (L) Heel/Foot/Toe Lt AKA          Devices In Places Blood Pressure Cuff and Pulse Ox      Interventions In Place Pillows, Q2 Turns, and Barrier Cream    Possible Skin Injury Yes    Pictures Uploaded Into Epic Yes  Wound Consult Placed Yes  RN Wound Prevention Protocol Ordered Yes

## 2025-01-16 NOTE — ED NOTES
Transferred pt to hospital bed and place on comfortable position  Bed alarm on  Given call light and instructed how to use it

## 2025-01-16 NOTE — PROGRESS NOTES
Bedside report received from DANYEL Shaikh. Patient resting in bed. Call bell within reach, bed alarm on and in place. All belongings within reach for patient. No further needs at this time.

## 2025-01-16 NOTE — CARE PLAN
The patient is Stable - Low risk of patient condition declining or worsening    Shift Goals  Clinical Goals: Safety, Pain control <3/10  Patient Goals: Discharge  Family Goals: gisele    Progress made toward(s) clinical / shift goals:  Patient reports abdominal and left leg pain and prn medication given with good relief. Patient on level 1 mildly thickened and tolerating well. Patient agreeable to q2 turns. Bed to lowest position, call light in reach and bed alarm on.     Patient is not progressing towards the following goals: N/A

## 2025-01-16 NOTE — H&P
Hospital Medicine History & Physical Note    Date of Service  1/15/2025    Primary Care Physician  Anum Gatica M.D.    Consultants  None    Code Status  Full Code    Chief Complaint  Chief Complaint   Patient presents with    ALOC     Per [facility] staff - LKW was sometime yesterday, unclear what time. Today staff noted patient to be acutely altered, normally Aox3 at baseline. FSBG 138, Patient GCS 10 on arrival, not following commands and nonverbal.       History of Presenting Illness  April Alicia is a 64 y.o. female with history of alcoholic cirrhosis, history of A-fib previously on Eliquis but no longer on anticoagulation due to GI bleed, GERD, asthma, hypertension, HCV, insulin-dependent diabetes, chronic pain syndrome who presented 1/15/2025 with evaluation for altered mental status.  In ER, patient is AO to self, however unable to provide other meaningful history.  No acute findings on CT head, she was found to have elevated ammonia level of 85, UA does not show pyuria.  Admission requested by ERP.  Therefore, admitted to medicine service for further evaluation and treatment.    I discussed the plan of care with patient, bedside RN, and pharmacy.    Review of Systems  Review of Systems   Unable to perform ROS: Acuity of condition   All other systems reviewed and are negative.  Unable to obtain due to patient's acute clinical status    Past Medical History   has a past medical history of Abnormal finding of lung (12/27/2022), Acute exacerbation of chronic obstructive pulmonary disease (COPD) (HCC) (01/27/2020), Alcoholic cirrhosis (HCC), Arthritis, ASTHMA, Atrial fibrillation (HCC), Dry eyes (11/16/2017), Edentulous, Emphysema of lung (HCC), H/O: hysterectomy (12/05/2019), Heart burn, Hepatic encephalopathy (HCC) (12/27/2022), Hepatitis C infection (07/28/2022), History of rheumatic fever (09/04/2017), Hypertension, 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HCC), Medication overuse headache (08/15/2017),  Mitral regurgitation, Pancytopenia ([facility]) (07/26/2022), Pneumonia (02/2020), Primary osteoarthritis of left knee (08/25/2020), Prosthetic joint infection ([facility]) (2018), Protein-calorie malnutrition, severe ([facility]) (08/01/2022), Right leg DVT ([facility]) (2018), Seizure disorder ([facility]), Septic arthritis of knee, left ([facility]) (07/19/2022), Septic arthritis of shoulder, left ([facility]) (07/17/2022), Septic shock due to Pseudomonas species ([facility]) (10/30/2023), and Type 2 diabetes mellitus ([facility]) (12/27/2022).    Surgical History   has a past surgical history that includes gyn surgery (1982); gyn surgery (2003); colonoscopy with clipping (10/28/2015); colonoscopy with sclerotherapy (10/28/2015); colonoscopy with tattooing (10/28/2015); irrigation & debridement ortho (Right, 9/3/2017); knee arthroplasty total (Right, 2018); pr total knee arthroplasty (Left, 8/24/2020); irrigation & debridement general (Left, 7/17/2022); pr revise knee joint replace,all parts (Left, 7/19/2022); pr revise knee joint replace,all parts (Left, 12/28/2022); irrigation & debridement general (Left, 12/28/2022); pr total knee arthroplasty (Left, 10/30/2023); knee amputation above (Left, 1/16/2024); and pr upper gi endoscopy,diagnosis (N/A, 11/27/2024).     Family History  family history includes Alcohol abuse in her brother; Dementia in her brother; Diabetes in her brother, brother, and mother; Heart Attack (age of onset: 45) in her father; Seizures in her father.   Family history reviewed with patient. There is no family history that is pertinent to the chief complaint.     Social History   reports that she quit smoking about 8 years ago. Her smoking use included cigarettes. She quit smokeless tobacco use about 3 years ago. She reports that she does not currently use alcohol. She reports that she does not currently use drugs.    Allergies  Allergies   Allergen Reactions    Meropenem Swelling     Pt had rxt to meropenem June 2024 (marked tongue swelling and right  facial/periorbital edema)    Tuberculin Tests Unspecified     Per MAR from [facility]       Medications  Prior to Admission Medications   Prescriptions Last Dose Informant Patient Reported? Taking?   Dextrose, Diabetic Use, (GLUCOSE) 77.4 % Gel  MAR from Other Facility Yes No   Sig: Take 1 Tube by mouth see administration instructions. For BS < 60   acetaminophen (TYLENOL) 325 MG Tab  MAR from Other Facility Yes No   Sig: Take 650 mg by mouth every 6 hours as needed for Mild Pain.   albuterol 108 (90 Base) MCG/ACT Aero Soln inhalation aerosol  MAR from Other Facility Yes No   Sig: Inhale 2 Puffs every four hours as needed for Shortness of Breath.   ascorbic acid (ASCORBIC ACID) 500 MG Tab  MAR from Other Facility Yes No   Sig: Take 500 mg by mouth every morning.   atorvastatin (LIPITOR) 20 MG Tab  MAR from Other Facility No No   Sig: Take 1 Tablet by mouth every evening.   bisacodyl (DULCOLAX) 10 MG Suppos  MAR from Other Facility Yes No   Sig: Insert 10 mg into the rectum 1 time a day as needed. Indications: Evacuation of Material from the Bowel   carvedilol (COREG) 3.125 MG Tab   No No   Sig: Take 1 Tablet by mouth 2 times a day with meals.   ferrous sulfate 325 (65 Fe) MG tablet  MAR from Other Facility Yes No   Sig: Take 325 mg by mouth 2 times a day.   furosemide (LASIX) 40 MG Tab  MAR from Other Facility No No   Sig: Take 1 Tablet by mouth every day.   gabapentin (NEURONTIN) 400 MG Cap  MAR from Other Facility Yes No   Sig: Take 400 mg by mouth 2 times a day.   glucagon 1 mg Recon Soln  MAR from Other Facility Yes No   Sig: Infuse 1 mg into a venous catheter one time.   insulin regular (HUMULIN R/NOVOLIN R) 100 Unit/mL Solution  MAR from Other Facility Yes No   Sig: Inject 1-10 Units under the skin 4 Times a Day,Before Meals and at Bedtime. Inject per sliding scale. For blood glucose:  151 - 200 = 1 unit  201 - 250 = 3 units  251 - 300 = 5 units  301 - 350 = 7 units  351 - 400 = 8 units  If >400 = 10 units and  oliver COLLINS   lactulose 20 GM/30ML Solution  MAR from Other Facility No No   Sig: Take 30 mL by mouth 3 times a day. Hold dose if 4 or more soft or loose stools in the last 24 hours, please gradually increase amount per dose by 5mls if you do not have at least 2 stools per day.   magnesium hydroxide (MILK OF MAGNESIA) 400 MG/5ML Suspension  MAR from Other Facility Yes No   Sig: Take 30 mL by mouth 1 time a day as needed. Indications: Constipation   magnesium oxide (MAG-OX) 400 MG Tab tablet  MAR from Other Facility Yes No   Sig: Take 400 mg by mouth every morning.   pantoprazole (PROTONIX) 20 MG tablet  MAR from Other Facility Yes No   Sig: Take 20 mg by mouth every morning.   spironolactone (ALDACTONE) 50 MG Tab  MAR from Other Facility Yes No   Sig: Take 50 mg by mouth every day.      Facility-Administered Medications: None       Physical Exam  Temp:  [36.3 °C (97.4 °F)-36.8 °C (98.3 °F)] 36.3 °C (97.4 °F)  Pulse:  [] 74  Resp:  [12-20] 16  BP: (146-184)/() 146/88  SpO2:  [97 %-99 %] 97 %  Blood Pressure: (!) 162/103   Temperature: 36.8 °C (98.3 °F)   Pulse: 74   Respiration: 16   Pulse Oximetry: 97 %       Physical Exam  Vitals and nursing note reviewed.   Constitutional:       General: She is not in acute distress.  HENT:      Head: Normocephalic and atraumatic.      Nose: Nose normal.      Mouth/Throat:      Mouth: Mucous membranes are dry.   Eyes:      General: No scleral icterus.  Cardiovascular:      Rate and Rhythm: Normal rate and regular rhythm.      Pulses: Normal pulses.      Heart sounds:      No friction rub.   Pulmonary:      Effort: No respiratory distress.      Breath sounds: No stridor. Rales present. No wheezing.   Chest:      Chest wall: No tenderness.   Abdominal:      General: There is distension.      Tenderness: There is no abdominal tenderness. There is no guarding or rebound.   Musculoskeletal:      Cervical back: Neck supple. No tenderness.      Comments: Trace LE edema  "  Skin:     General: Skin is warm and dry.      Capillary Refill: Capillary refill takes less than 2 seconds.   Neurological:      Mental Status: She is alert.      Comments: Arousable to hard sternal rub  Withdraws to to external stimuli  Pupil reactive   Psychiatric:      Comments: Unable to evaluate         Laboratory:  Recent Labs     01/15/25  1548   WBC 4.1*   RBC 3.88*   HEMOGLOBIN 11.1*   HEMATOCRIT 34.8*   MCV 89.7   MCH 28.6   MCHC 31.9*   RDW 51.3*   PLATELETCT 66*   MPV 9.9     Recent Labs     01/15/25  1548   SODIUM 139   POTASSIUM 4.3   CHLORIDE 109   CO2 18*   GLUCOSE 114*   BUN 29*   CREATININE 0.81   CALCIUM 8.7     Recent Labs     01/15/25  1548   ALTSGPT 11   ASTSGOT 19   ALKPHOSPHAT 186*   TBILIRUBIN 1.2   GLUCOSE 114*     Recent Labs     01/15/25  1548   APTT 37.2*   INR 1.39*     No results for input(s): \"NTPROBNP\" in the last 72 hours.      Recent Labs     01/15/25  1548   TROPONINT 20*       Imaging:  CT-HEAD W/O   Final Result      1. No acute intracranial abnormality.   2. Atrophy and diffuse chronic microangiopathic white matter changes versus demyelination or gliosis.   3. Atherosclerosis.               DX-CHEST-PORTABLE (1 VIEW)   Final Result      1. Diffuse interstitial opacities throughout the lungs, compatible with interstitial edema and/or pneumonitis.   2. Stable cardiomegaly and calcified mediastinal lymph nodes.   3. No other acute findings.          X-Ray:  I have personally reviewed the images and compared with prior images.  EKG:  I have personally reviewed the images and compared with prior images.    Assessment/Plan:  Justification for Admission Status  I anticipate this patient will require least 2 midnights hospitalization, therefore appropriate for inpatient status.      * Acute hepatic encephalopathy (HCC)- (present on admission)  Assessment & Plan  History of alcoholic cirrhosis  Admission ammonia 85  No acute findings on CT head  UA does not show pyuria    Lactulose 45 " cc 3 times daily  Continue Xifaxan, Lasix, spironolactone  Trial of high-dose IV thiamine    Alcoholic cirrhosis (HCC)- (present on admission)  Assessment & Plan  Continue Xifaxan, lactulose, spironolactone, Lasix    Asthma  Assessment & Plan  Currently not in acute exacerbation    Type 2 diabetes mellitus with diabetic neuropathy, with long-term current use of insulin (HCC)- (present on admission)  Assessment & Plan  Continue ISS  Statin    Chronic pain syndrome- (present on admission)  Assessment & Plan  Continue home pain meds once patient is more alert    GERD (gastroesophageal reflux disease)- (present on admission)  Assessment & Plan  Omeprazole    Dyslipidemia- (present on admission)  Assessment & Plan  Statin    Paroxysmal atrial fibrillation- (present on admission)  Assessment & Plan  Continue Coreg  No longer on Eliquis due to history of GI bleed    Hypertension- (present on admission)  Assessment & Plan  Continue Coreg, Lasix, spironolactone        VTE prophylaxis: SCDs/TEDs

## 2025-01-16 NOTE — RESPIRATORY CARE
COPD EDUCATION by COPD CLINICAL EDUCATOR  1/16/2025 at 8:25 AM by Diane Garcia RRT     Patient reviewed by COPD education team. Patient does not have a history or diagnosis of COPD and is a non-smoker.  Therefore, patient does not qualify for the COPD program.

## 2025-01-16 NOTE — ED NOTES
Medication history reviewed with MAR provided by [facility] [phone]. Med rec is complete  Allergies reviewed.     Patient has not had any outpatient antibiotics in the last 30 days.   Anticoagulants: No    Demetrius Alcaraz

## 2025-01-16 NOTE — HOSPITAL COURSE
April Alicia is a 64-year-old female with PMHx alcoholic liver cirrhosis, history of A-fib not on anticoagulation due to GIB, GERD, asthma, HTN, HCV, insulin-dependent DMT2, chronic pain.  Admitted 1/15 for altered mental status.    Per history patient has been residing at [facility].  Patient was brought to the emergency room for further evaluation for altered mental status.  In the ED-patient is alert and oriented to self.  CT head: Negative for acute abnormalities.  Ammonia elevated at 85.  UA negative for UTI.    Patient was restarted on her lactulose and rifaximin.  She had multiple bowel movements and subsequent improvement in her mentation.  Important that patient continue lactulose regimen for regular bowel movements 2 to 3/day.  She is discharged back to [facility] skilled nursing facility.

## 2025-01-16 NOTE — PROGRESS NOTES
Hospital Medicine Daily Progress Note    Date of Service  1/16/2025    Chief Complaint  April Alicia is a 64 y.o. female admitted 1/15/2025 with altered mental status    Hospital Course  April Alicia is a 64-year-old female with PMHx alcoholic liver cirrhosis, history of A-fib not on anticoagulation due to GIB, GERD, asthma, HTN, HCV, insulin-dependent DMT2, chronic pain.  Admitted 1/15 for altered mental status.    Per history patient has been residing at [facility].  Patient was brought to the emergency room for further evaluation for altered mental status.  In the ED-patient is alert and oriented to self.  CT head: Negative for acute abnormalities.  Ammonia elevated at 85.  UA negative for UTI.    Interval Problem Update  1/16: Vitals notable for  through 160.  WBC stable at 4.  Hb stable at 10.  Platelets stable at 61.  Continue gentle diuresis today.  Ammonia 112 from 85.  Initially, patient quite lethargic and confused.  Mentation improved throughout the day.  PT OT consults placed for discharge planning.  Continue lactulose and monitor closely for BM.    I have discussed this patient's plan of care and discharge plan at IDT rounds today with Case Management, Nursing, Nursing leadership, and other members of the IDT team.    Consultants/Specialty  None at this time    Code Status  Full Code    Disposition  The patient is not medically cleared for discharge to home or a post-acute facility.      I have placed the appropriate orders for post-discharge needs.    Review of Systems  Review of Systems   Constitutional:  Positive for malaise/fatigue. Negative for fever.   Respiratory:  Negative for shortness of breath.    Cardiovascular:  Negative for chest pain and leg swelling.   Gastrointestinal:  Negative for abdominal pain, nausea and vomiting.        Physical Exam  Temp:  [35.9 °C (96.6 °F)-36.8 °C (98.3 °F)] 35.9 °C (96.6 °F)  Pulse:  [] 65  Resp:  [12-20] 17  BP:  (118-184)/() 135/85  SpO2:  [97 %-99 %] 99 %    Physical Exam  Vitals and nursing note reviewed.   Constitutional:       General: She is not in acute distress.     Appearance: Normal appearance. She is not ill-appearing.   Cardiovascular:      Rate and Rhythm: Normal rate and regular rhythm.      Heart sounds: Murmur heard.   Pulmonary:      Effort: Pulmonary effort is normal. No respiratory distress.      Breath sounds: Normal breath sounds. No wheezing.   Skin:     General: Skin is warm and dry.   Neurological:      Mental Status: She is alert and oriented to person, place, and time.   Psychiatric:         Mood and Affect: Mood normal.         Behavior: Behavior normal.         Fluids    Intake/Output Summary (Last 24 hours) at 1/16/2025 1327  Last data filed at 1/16/2025 0533  Gross per 24 hour   Intake 340 ml   Output --   Net 340 ml        Laboratory  Recent Labs     01/15/25  1548 01/16/25  0130   WBC 4.1* 4.0*   RBC 3.88* 3.52*   HEMOGLOBIN 11.1* 10.0*   HEMATOCRIT 34.8* 30.9*   MCV 89.7 87.8   MCH 28.6 28.4   MCHC 31.9* 32.4   RDW 51.3* 50.5*   PLATELETCT 66* 61*   MPV 9.9 11.5     Recent Labs     01/15/25  1548 01/16/25  0130   SODIUM 139 141   POTASSIUM 4.3 4.0   CHLORIDE 109 115*   CO2 18* 18*   GLUCOSE 114* 117*   BUN 29* 28*   CREATININE 0.81 0.85   CALCIUM 8.7 8.6     Recent Labs     01/15/25  1548   APTT 37.2*   INR 1.39*               Imaging  CT-HEAD W/O   Final Result      1. No acute intracranial abnormality.   2. Atrophy and diffuse chronic microangiopathic white matter changes versus demyelination or gliosis.   3. Atherosclerosis.               DX-CHEST-PORTABLE (1 VIEW)   Final Result      1. Diffuse interstitial opacities throughout the lungs, compatible with interstitial edema and/or pneumonitis.   2. Stable cardiomegaly and calcified mediastinal lymph nodes.   3. No other acute findings.           Assessment/Plan  * Acute hepatic encephalopathy (HCC)- (present on admission)  Assessment  & Plan  History of alcoholic cirrhosis  Ammonia 85 --> 112  Unknown last BM  - Continue lactulose  - Continue rifaximin  - Continue high-dose thiamine  Improving      Asthma  Assessment & Plan  Currently not in acute exacerbation    Type 2 diabetes mellitus with diabetic neuropathy, with long-term current use of insulin (HCC)- (present on admission)  Assessment & Plan  A1C 5.1   - Continue sliding scale insulin; has not required insulin since admission  - Monitor BG trend.   - Accu-Cheks before meals and at bedtime.   - Goal to keep BG between 140-180 per 2019 ADA guidelines       Chronic pain syndrome- (present on admission)  Assessment & Plan  Continue home gabapentin  Continue as needed oxycodone for pain    GERD (gastroesophageal reflux disease)- (present on admission)  Assessment & Plan  Omeprazole    Dyslipidemia- (present on admission)  Assessment & Plan  Statin    Paroxysmal atrial fibrillation- (present on admission)  Assessment & Plan  Continue Coreg  No longer on Eliquis due to history of GI bleed    Hypertension- (present on admission)  Assessment & Plan   through 160  - Continue Coreg and spironolactone    Alcoholic cirrhosis (HCC)- (present on admission)  Assessment & Plan  MELD 12  - Continue rifaximin, lactulose, spironolactone  - Increase Lasix to 80 mg daily         VTE prophylaxis:   SCDs/TEDs      I have performed a physical exam and reviewed and updated ROS and Plan today (1/16/2025). In review of yesterday's note (1/15/2025), there are no changes except as documented above.

## 2025-01-16 NOTE — ASSESSMENT & PLAN NOTE
History of alcoholic cirrhosis  Ammonia 85 --> 112  Unknown last BM  - Continue lactulose  - Continue rifaximin  - Continue high-dose thiamine  Improving

## 2025-01-16 NOTE — PROGRESS NOTES
Received patient in bed, A&Ox2, confused and unable to answer questions appropriately. Not able to complete admission navigator at this time.

## 2025-01-16 NOTE — PROGRESS NOTES
Mesha from Lab called with critical result of ammonia level of 112 at 0946. Critical lab result read back to Mesha.   Dr. Ramos notified of critical lab result at 0949.  Critical lab result read back by Dr. Ramos.

## 2025-01-17 VITALS
RESPIRATION RATE: 16 BRPM | OXYGEN SATURATION: 97 % | HEIGHT: 68 IN | TEMPERATURE: 97.9 F | HEART RATE: 70 BPM | WEIGHT: 168.87 LBS | BODY MASS INDEX: 25.59 KG/M2 | DIASTOLIC BLOOD PRESSURE: 79 MMHG | SYSTOLIC BLOOD PRESSURE: 138 MMHG

## 2025-01-17 LAB
ALBUMIN SERPL BCP-MCNC: 2.2 G/DL (ref 3.2–4.9)
ALBUMIN/GLOB SERPL: 0.6 G/DL
ALP SERPL-CCNC: 149 U/L (ref 30–99)
ALT SERPL-CCNC: 8 U/L (ref 2–50)
ANION GAP SERPL CALC-SCNC: 9 MMOL/L (ref 7–16)
AST SERPL-CCNC: 16 U/L (ref 12–45)
BILIRUB SERPL-MCNC: 0.8 MG/DL (ref 0.1–1.5)
BUN SERPL-MCNC: 28 MG/DL (ref 8–22)
CALCIUM ALBUM COR SERPL-MCNC: 9.2 MG/DL (ref 8.5–10.5)
CALCIUM SERPL-MCNC: 7.8 MG/DL (ref 8.5–10.5)
CHLORIDE SERPL-SCNC: 112 MMOL/L (ref 96–112)
CO2 SERPL-SCNC: 17 MMOL/L (ref 20–33)
CREAT SERPL-MCNC: 0.83 MG/DL (ref 0.5–1.4)
ERYTHROCYTE [DISTWIDTH] IN BLOOD BY AUTOMATED COUNT: 51.8 FL (ref 35.9–50)
GFR SERPLBLD CREATININE-BSD FMLA CKD-EPI: 78 ML/MIN/1.73 M 2
GLOBULIN SER CALC-MCNC: 4 G/DL (ref 1.9–3.5)
GLUCOSE BLD STRIP.AUTO-MCNC: 124 MG/DL (ref 65–99)
GLUCOSE BLD STRIP.AUTO-MCNC: 147 MG/DL (ref 65–99)
GLUCOSE BLD STRIP.AUTO-MCNC: 99 MG/DL (ref 65–99)
GLUCOSE SERPL-MCNC: 126 MG/DL (ref 65–99)
HCT VFR BLD AUTO: 30.3 % (ref 37–47)
HGB BLD-MCNC: 9.7 G/DL (ref 12–16)
MAGNESIUM SERPL-MCNC: 1.7 MG/DL (ref 1.5–2.5)
MCH RBC QN AUTO: 28.5 PG (ref 27–33)
MCHC RBC AUTO-ENTMCNC: 32 G/DL (ref 32.2–35.5)
MCV RBC AUTO: 89.1 FL (ref 81.4–97.8)
PHOSPHATE SERPL-MCNC: 3.8 MG/DL (ref 2.5–4.5)
PLATELET # BLD AUTO: 62 K/UL (ref 164–446)
PLATELETS.RETICULATED NFR BLD AUTO: 1.3 % (ref 0.6–13.1)
PMV BLD AUTO: 9.2 FL (ref 9–12.9)
POTASSIUM SERPL-SCNC: 4.3 MMOL/L (ref 3.6–5.5)
PROT SERPL-MCNC: 6.2 G/DL (ref 6–8.2)
RBC # BLD AUTO: 3.4 M/UL (ref 4.2–5.4)
SODIUM SERPL-SCNC: 138 MMOL/L (ref 135–145)
WBC # BLD AUTO: 4.2 K/UL (ref 4.8–10.8)

## 2025-01-17 PROCEDURE — 700102 HCHG RX REV CODE 250 W/ 637 OVERRIDE(OP): Performed by: STUDENT IN AN ORGANIZED HEALTH CARE EDUCATION/TRAINING PROGRAM

## 2025-01-17 PROCEDURE — 82962 GLUCOSE BLOOD TEST: CPT

## 2025-01-17 PROCEDURE — 99239 HOSP IP/OBS DSCHRG MGMT >30: CPT | Performed by: STUDENT IN AN ORGANIZED HEALTH CARE EDUCATION/TRAINING PROGRAM

## 2025-01-17 PROCEDURE — 85055 RETICULATED PLATELET ASSAY: CPT

## 2025-01-17 PROCEDURE — 83735 ASSAY OF MAGNESIUM: CPT

## 2025-01-17 PROCEDURE — 36415 COLL VENOUS BLD VENIPUNCTURE: CPT

## 2025-01-17 PROCEDURE — 84100 ASSAY OF PHOSPHORUS: CPT

## 2025-01-17 PROCEDURE — 80053 COMPREHEN METABOLIC PANEL: CPT

## 2025-01-17 PROCEDURE — A9270 NON-COVERED ITEM OR SERVICE: HCPCS | Performed by: STUDENT IN AN ORGANIZED HEALTH CARE EDUCATION/TRAINING PROGRAM

## 2025-01-17 PROCEDURE — 85027 COMPLETE CBC AUTOMATED: CPT

## 2025-01-17 RX ADMIN — GABAPENTIN 400 MG: 400 CAPSULE ORAL at 05:01

## 2025-01-17 RX ADMIN — CARVEDILOL 6.25 MG: 6.25 TABLET, FILM COATED ORAL at 08:39

## 2025-01-17 RX ADMIN — OXYCODONE HYDROCHLORIDE 10 MG: 10 TABLET ORAL at 05:40

## 2025-01-17 RX ADMIN — RIFAXIMIN 550 MG: 550 TABLET ORAL at 05:01

## 2025-01-17 RX ADMIN — ACETAMINOPHEN 650 MG: 325 TABLET ORAL at 11:05

## 2025-01-17 RX ADMIN — OXYCODONE HYDROCHLORIDE AND ACETAMINOPHEN 500 MG: 500 TABLET ORAL at 05:01

## 2025-01-17 RX ADMIN — OMEPRAZOLE 20 MG: 20 CAPSULE, DELAYED RELEASE ORAL at 05:01

## 2025-01-17 RX ADMIN — OXYCODONE HYDROCHLORIDE 10 MG: 10 TABLET ORAL at 12:32

## 2025-01-17 RX ADMIN — LACTULOSE 45 ML: 10 SOLUTION ORAL at 11:05

## 2025-01-17 ASSESSMENT — PAIN DESCRIPTION - PAIN TYPE
TYPE: ACUTE PAIN
TYPE: ACUTE PAIN
TYPE: NEUROPATHIC PAIN

## 2025-01-17 NOTE — PROGRESS NOTES
IV removed. Patient discharging to SNF with [facility]; educated on need for daily lactulose and bowel movements. Patient signed AVS. Sent with facesheet, AVS, DC Summary, and COBRA packet.

## 2025-01-17 NOTE — PROGRESS NOTES
RN clarified discharge orders with hospitalist (Richard). Patient will need to continue lactose at SNF, no need for follow-up labs.

## 2025-01-17 NOTE — DISCHARGE PLANNING
LISETTE was notified that patient is cleared to return to [facility]. Rory at [facility] has stated they can accept the patient back at noon. Transportation arranged via [facility]. LISETTE spoke with patient's daughter to inform her that patient will be returning to the facility today. No additional needs identified for this patient.

## 2025-01-17 NOTE — PROGRESS NOTES
RN called [facility] to give report as pt is scheduled to DC with [facility] at 12pm today. RN transferred to [facility] but receiving RN not available. This RN left her direct call back number for report.

## 2025-01-17 NOTE — PROGRESS NOTES
4 Eyes Skin Assessment Completed by DANYEL Bartlett and DANYEL Shaikh.    Head WDL  Ears Redness on back of Rt ear  Nose WDL  Mouth WDL  Neck WDL  Breast/Chest WDL  Shoulder Blades WDL  Spine WDL  (R) Arm/Elbow/Hand Bruising and Discoloration  (L) Arm/Elbow/Hand Redness, Blanching, Bruising, Scab, and Discoloration  Abdomen WDL  Groin Rash  Scrotum/Coccyx/Buttocks Redness, open area, scarred tissue  (R) Leg Scar  (L) Leg Lt AKA  (R) Heel/Foot/Toe Redness and Blanching  (L) Heel/Foot/Toe Lt AKA           Devices In Places Blood Pressure Cuff and Pulse Ox      Interventions In Place Pillows, Q2 Turns, and Barrier Cream    Possible Skin Injury Yes    Pictures Uploaded Into Epic previously done   Wound Consult Placed previously done   RN Wound Prevention Protocol Ordered Yes

## 2025-01-17 NOTE — DISCHARGE SUMMARY
Discharge Summary    CHIEF COMPLAINT ON ADMISSION  Chief Complaint   Patient presents with    ALOC     Per [facility] staff - LKW was sometime yesterday, unclear what time. Today staff noted patient to be acutely altered, normally Aox3 at baseline. FSBG 138, Patient GCS 10 on arrival, not following commands and nonverbal.       Reason for Admission  ems     Admission Date  1/15/2025    CODE STATUS  Full Code    HPI & HOSPITAL COURSE    April Alicia is a 64-year-old female with PMHx alcoholic liver cirrhosis, history of A-fib not on anticoagulation due to GIB, GERD, asthma, HTN, HCV, insulin-dependent DMT2, chronic pain.  Admitted 1/15 for altered mental status.    Per history patient has been residing at [facility].  Patient was brought to the emergency room for further evaluation for altered mental status.  In the ED-patient is alert and oriented to self.  CT head: Negative for acute abnormalities.  Ammonia elevated at 85.  UA negative for UTI.    Patient was restarted on her lactulose and rifaximin.  She had multiple bowel movements and subsequent improvement in her mentation.  Important that patient continue lactulose regimen for regular bowel movements 2 to 3/day.  She is discharged back to [facility] nursing [facility].    Therefore, she is discharged in fair and stable condition to skilled nursing facility.    The patient met 2-midnight criteria for an inpatient stay at the time of discharge.    Discharge Date  1/17/2025    FOLLOW UP ITEMS POST DISCHARGE  Follow-up with primary care physician 1 to 2 weeks    DISCHARGE DIAGNOSES  Principal Problem:    Acute hepatic encephalopathy (HCC) (POA: Yes)  Active Problems:    Alcoholic cirrhosis (HCC) (POA: Yes)    Hypertension (POA: Yes)    Paroxysmal atrial fibrillation (POA: Yes)    Dyslipidemia (POA: Yes)    GERD (gastroesophageal reflux disease) (POA: Yes)    Chronic pain syndrome (POA: Yes)    Type 2 diabetes mellitus with diabetic neuropathy, with long-term  current use of insulin (HCC) (POA: Yes)    Asthma (POA: Unknown)  Resolved Problems:    * No resolved hospital problems. *      FOLLOW UP  No future appointments.  No follow-up provider specified.    MEDICATIONS ON DISCHARGE     Medication List        CONTINUE taking these medications        Instructions   acetaminophen 325 MG Tabs  Commonly known as: Tylenol   Take 650 mg by mouth every 6 hours as needed for Mild Pain.  Dose: 650 mg     albuterol 108 (90 Base) MCG/ACT Aers inhalation aerosol   Inhale 2 Puffs every four hours as needed for Shortness of Breath.  Dose: 2 Puff     ascorbic acid 500 MG Tabs  Commonly known as: Ascorbic Acid   Take 500 mg by mouth every morning.  Dose: 500 mg     atorvastatin 20 MG Tabs  Commonly known as: Lipitor   Take 1 Tablet by mouth every evening.  Dose: 20 mg     carvedilol 6.25 MG Tabs  Commonly known as: Coreg   Take 6.25 mg by mouth 2 times a day with meals.  Dose: 6.25 mg     ferrous sulfate 325 (65 Fe) MG tablet   Take 325 mg by mouth at bedtime.  Dose: 325 mg     furosemide 40 MG Tabs  Commonly known as: Lasix   Take 1 Tablet by mouth every day.  Dose: 40 mg     gabapentin 400 MG Caps  Commonly known as: Neurontin   Take 400 mg by mouth 2 times a day.  Dose: 400 mg     glucose 77.4 % Gel   Take 1 Tube by mouth see administration instructions. For BS < 60  Dose: 1 Tube     insulin regular 100 Unit/mL Soln  Commonly known as: HumuLIN R/NovoLIN R   Inject 1-10 Units under the skin 4 Times a Day,Before Meals and at Bedtime. Inject per sliding scale. For blood glucose:  151 - 200 = 1 unit  201 - 250 = 3 units  251 - 300 = 5 units  301 - 350 = 7 units  351 - 400 = 8 units  If >400 = 10 units and call M.D.  Dose: 1-10 Units     ipratropium-albuterol 0.5-2.5 (3) MG/3ML nebulizer solution  Commonly known as: Duoneb   Take 3 mL by nebulization every four hours as needed for Shortness of Breath.  Dose: 3 mL     lactulose 20 GM/30ML Soln   Take 30 mL by mouth 3 times a day. Hold dose if  4 or more soft or loose stools in the last 24 hours, please gradually increase amount per dose by 5mls if you do not have at least 2 stools per day.  Dose: 30 mL     magnesium oxide 400 MG Tabs tablet  Commonly known as: Mag-Ox   Take 400 mg by mouth every morning.  Dose: 400 mg     oxyCODONE immediate release 10 MG immediate release tablet  Commonly known as: Roxicodone   Take 10 mg by mouth every 6 hours as needed for Moderate Pain.  Dose: 10 mg     pantoprazole 20 MG tablet  Commonly known as: Protonix   Take 20 mg by mouth every morning.  Dose: 20 mg     spironolactone 50 MG Tabs  Commonly known as: Aldactone   Take 50 mg by mouth every day.  Dose: 50 mg              Allergies  Allergies   Allergen Reactions    Meropenem Swelling     Pt had rxt to meropenem June 2024 (marked tongue swelling and right facial/periorbital edema)    Tuberculin Tests Unspecified     Per MAR from [facility]       DIET  Orders Placed This Encounter   Procedures    Diet Order Diet: Consistent CHO (Diabetic)     Standing Status:   Standing     Number of Occurrences:   1     Order Specific Question:   Diet:     Answer:   Consistent CHO (Diabetic) [4]       ACTIVITY  As tolerated and directed by skilled nursing.  Weight bearing as tolerated    CONSULTATIONS  None    PROCEDURES  None    LABORATORY  Lab Results   Component Value Date    SODIUM 138 01/17/2025    POTASSIUM 4.3 01/17/2025    CHLORIDE 112 01/17/2025    CO2 17 (L) 01/17/2025    GLUCOSE 126 (H) 01/17/2025    BUN 28 (H) 01/17/2025    CREATININE 0.83 01/17/2025    CREATININE 0.9 03/12/2009        Lab Results   Component Value Date    WBC 4.2 (L) 01/17/2025    HEMOGLOBIN 9.7 (L) 01/17/2025    HEMATOCRIT 30.3 (L) 01/17/2025    PLATELETCT 62 (L) 01/17/2025      CT-HEAD W/O   Final Result      1. No acute intracranial abnormality.   2. Atrophy and diffuse chronic microangiopathic white matter changes versus demyelination or gliosis.   3. Atherosclerosis.               DX-CHEST-PORTABLE  (1 VIEW)   Final Result      1. Diffuse interstitial opacities throughout the lungs, compatible with interstitial edema and/or pneumonitis.   2. Stable cardiomegaly and calcified mediastinal lymph nodes.   3. No other acute findings.            Total time of the discharge process exceeds 47 minutes.

## 2025-01-17 NOTE — CARE PLAN
The patient is Stable - Low risk of patient condition declining or worsening    Shift Goals  Clinical Goals: Pain management  Patient Goals: EDMUND  Family Goals: Family not present    Progress made toward(s) clinical / shift goals:  Patient medicated for pain, Q2 turns, patient resting comfortably, call light within reach.  Pos dc to SNF today.    Patient is not progressing towards the following goals:

## 2025-01-17 NOTE — CARE PLAN
The patient is Watcher - Medium risk of patient condition declining or worsening    Shift Goals  Clinical Goals: Discharge before end of shift  Patient Goals: Go back to [place] today  Family Goals: EDMUND    Progress made toward(s) clinical / shift goals:  April has been medically cleared by provider and discharged back to [facility].     Patient is not progressing towards the following goals:

## 2025-01-17 NOTE — DISCHARGE PLANNING
DC Transport Scheduled    Transport Company Scheduled:  [facility] [facility] [employer]  Spoke with Wild at [employer] to schedule transport.  Trip #:     Scheduled Date: 1/17/2025  Scheduled Time: 1300    Transport Type: [place]  Destination:   [facility]   Destination address: [address] [place] [address]    Notified care team of scheduled transport via Vo[employer]te.     If there are any changes needed to the DC transportation scheduled, please contact [facility] at ext. [phone] between the hours of [phone]. If outside those hours, contact the ED Case Manager at ext. [phone].

## 2025-01-17 NOTE — PROGRESS NOTES
Report for [facility] given to DANYEL Hatch. Mamie states she is very familiar with patient. Pt is oriented and back to baseline, this RN to follow up with DC orders and medications.

## 2025-01-18 LAB
BACTERIA UR CULT: NORMAL
SIGNIFICANT IND 70042: NORMAL
SITE SITE: NORMAL
SOURCE SOURCE: NORMAL

## 2025-01-28 ENCOUNTER — HOSPITAL ENCOUNTER (OUTPATIENT)
Facility: MEDICAL CENTER | Age: 65
End: 2025-01-28
Attending: FAMILY MEDICINE
Payer: COMMERCIAL

## 2025-01-28 NOTE — DOCUMENTATION QUERY
"                                                                         [facility]                                                                       Query Response Note      PATIENT:               FRANCISCA TURNER  ACCT #:                  [id number]  MRN:                     [id number]  :                      1960  ADMIT DATE:       1/15/2025 3:13 PM  DISCH DATE:        2025 1:35 PM  RESPONDING  PROVIDER #:        [id number]           QUERY TEXT:    Hgb- 9.7; Platelet count- 62; and WBC- 4.2 are noted in the record.  Please clarify the clinical/diagnostic relevance for the documented findings.    The patient's clinical indicators include:  Clinical Findings:     - PN- Dr. Ramos:  \" PMHx alcoholic liver cirrhosis,\"  \" WBC stable at 4.  Hb stable at 10.  Platelets stable at 61.\"    Labs :  Hgb: 11.1-10.0-9.7  Platelet count: 66-61-62  WBC: 4.1-4.0-4.2  Absolute neutrophils: 2.44-2.64    Risk factors: Alcoholic cirrhosis; hepatic encephalopathy, GERD, HTN, HCV, IDDM    Treatment: CBC monitoring    Please contact me with any questions:    Gely DENG RN CCDS  [facility]  [email or web address]  Gely Boston via Voalte  Options provided:   -- Pancytopenia due to alcohol cirrhosis monitored   -- Pancytopenia due to other cause monitored, Please specify other cause of pancytopenia   -- Findings of no clinical significance   -- Other explanation, (Please specify the other explanation)      Query created by: Gely Boston on 2025 12:19 PM    RESPONSE TEXT:    Pancytopenia due to alcohol cirrhosis monitored       QUERY TEXT:    Please clarify in documentation the relationship, if any, between hepatic encephalopathy and alcoholic cirrhosis:      Note: If a query response diagnosis is provided, please include it in your daily notes and discharge summary.    The patient's Clinical Indicators include:  Clinical Findings:    - Discharge summary Dr. Ramos:  \"  Admitted 1/15 for altered " "mental status.\"  \"Patient was restarted on her lactulose and rifaximin.   She had multiple bowel movements and subsequent improvement in her mentation.\"  \"DISCHARGE DIAGNOSES :    Principal Problem:  Acute hepatic encephalopathy (HCC) (POA: Yes)\"  \"Alcoholic cirrhosis (HCC) (POA: Yes)\"    Ammonia:  85 - 112    Risk factors: Hepatic encephalopathy, alcoholic cirrhosis    Treatment:  CT head; lactulose; rifaximin; thiamine IV,     Please contact me with any questions:    Gely DENG RN CCDS  [facility]  [email or web address]  Gely Boston via Voalte  Options provided:   -- Condition hepatic encephalopathy is due to or associated with alcoholic cirrhosis   -- Condition hepatic encephalopathy is not due to or associated with alcoholic cirrhosis   -- Other explanation, please specify_____   -- Unable to determine      Query created by: Gely Boston on 1/17/2025 12:20 PM    RESPONSE TEXT:    Condition hepatic encephalopathy is due to or associated with alcoholic cirrhosis          Electronically signed by:  ADI MEZA MD 1/28/2025 5:37 AM              "

## 2025-01-29 ENCOUNTER — HOSPITAL ENCOUNTER (INPATIENT)
Facility: MEDICAL CENTER | Age: 65
LOS: 1 days | End: 2025-01-31
Attending: EMERGENCY MEDICINE | Admitting: INTERNAL MEDICINE
Payer: MEDICAID

## 2025-01-29 DIAGNOSIS — N39.0 URINARY TRACT INFECTION WITHOUT HEMATURIA, SITE UNSPECIFIED: ICD-10-CM

## 2025-01-29 DIAGNOSIS — B37.9 CANDIDA ALBICANS INFECTION: ICD-10-CM

## 2025-01-29 DIAGNOSIS — R41.82 ALTERED MENTAL STATUS, UNSPECIFIED ALTERED MENTAL STATUS TYPE: ICD-10-CM

## 2025-01-29 DIAGNOSIS — K76.82 ACUTE HEPATIC ENCEPHALOPATHY (HCC): ICD-10-CM

## 2025-01-29 DIAGNOSIS — M51.369 DEGENERATION OF INTERVERTEBRAL DISC OF LUMBAR REGION, UNSPECIFIED WHETHER PAIN PRESENT: ICD-10-CM

## 2025-01-29 DIAGNOSIS — K76.82 HEPATIC ENCEPHALOPATHY (HCC): ICD-10-CM

## 2025-01-29 LAB
ALBUMIN SERPL BCP-MCNC: 2.6 G/DL (ref 3.2–4.9)
ALBUMIN/GLOB SERPL: 0.6 G/DL
ALP SERPL-CCNC: 167 U/L (ref 30–99)
ALT SERPL-CCNC: 12 U/L (ref 2–50)
AMMONIA PLAS-SCNC: 120 UMOL/L (ref 11–45)
ANION GAP SERPL CALC-SCNC: 10 MMOL/L (ref 7–16)
APPEARANCE UR: CLEAR
AST SERPL-CCNC: 15 U/L (ref 12–45)
BACTERIA #/AREA URNS HPF: ABNORMAL /HPF
BASOPHILS # BLD AUTO: 1.1 % (ref 0–1.8)
BASOPHILS # BLD: 0.04 K/UL (ref 0–0.12)
BILIRUB SERPL-MCNC: 1.1 MG/DL (ref 0.1–1.5)
BILIRUB UR QL STRIP.AUTO: NEGATIVE
BUN SERPL-MCNC: 33 MG/DL (ref 8–22)
CALCIUM ALBUM COR SERPL-MCNC: 9.6 MG/DL (ref 8.5–10.5)
CALCIUM SERPL-MCNC: 8.5 MG/DL (ref 8.5–10.5)
CASTS URNS QL MICRO: ABNORMAL /LPF (ref 0–2)
CHLORIDE SERPL-SCNC: 111 MMOL/L (ref 96–112)
CO2 SERPL-SCNC: 16 MMOL/L (ref 20–33)
COLOR UR: YELLOW
CREAT SERPL-MCNC: 0.67 MG/DL (ref 0.5–1.4)
EOSINOPHIL # BLD AUTO: 0.18 K/UL (ref 0–0.51)
EOSINOPHIL NFR BLD: 4.8 % (ref 0–6.9)
EPITHELIAL CELLS 1715: ABNORMAL /HPF (ref 0–5)
ERYTHROCYTE [DISTWIDTH] IN BLOOD BY AUTOMATED COUNT: 53.3 FL (ref 35.9–50)
GFR SERPLBLD CREATININE-BSD FMLA CKD-EPI: 97 ML/MIN/1.73 M 2
GLOBULIN SER CALC-MCNC: 4.3 G/DL (ref 1.9–3.5)
GLUCOSE SERPL-MCNC: 109 MG/DL (ref 65–99)
GLUCOSE UR STRIP.AUTO-MCNC: NEGATIVE MG/DL
HCT VFR BLD AUTO: 32.3 % (ref 37–47)
HGB BLD-MCNC: 10.4 G/DL (ref 12–16)
IMM GRANULOCYTES # BLD AUTO: 0.01 K/UL (ref 0–0.11)
IMM GRANULOCYTES NFR BLD AUTO: 0.3 % (ref 0–0.9)
KETONES UR STRIP.AUTO-MCNC: NEGATIVE MG/DL
LEUKOCYTE ESTERASE UR QL STRIP.AUTO: ABNORMAL
LYMPHOCYTES # BLD AUTO: 1.26 K/UL (ref 1–4.8)
LYMPHOCYTES NFR BLD: 33.3 % (ref 22–41)
MCH RBC QN AUTO: 29.1 PG (ref 27–33)
MCHC RBC AUTO-ENTMCNC: 32.2 G/DL (ref 32.2–35.5)
MCV RBC AUTO: 90.2 FL (ref 81.4–97.8)
MICRO URNS: ABNORMAL
MONOCYTES # BLD AUTO: 0.22 K/UL (ref 0–0.85)
MONOCYTES NFR BLD AUTO: 5.8 % (ref 0–13.4)
NEUTROPHILS # BLD AUTO: 2.07 K/UL (ref 1.82–7.42)
NEUTROPHILS NFR BLD: 54.7 % (ref 44–72)
NITRITE UR QL STRIP.AUTO: POSITIVE
NRBC # BLD AUTO: 0 K/UL
NRBC BLD-RTO: 0 /100 WBC (ref 0–0.2)
PH UR STRIP.AUTO: 8.5 [PH] (ref 5–8)
PLATELET # BLD AUTO: 41 K/UL (ref 164–446)
PLATELETS.RETICULATED NFR BLD AUTO: 1.6 % (ref 0.6–13.1)
POTASSIUM SERPL-SCNC: 5.1 MMOL/L (ref 3.6–5.5)
PROT SERPL-MCNC: 6.9 G/DL (ref 6–8.2)
PROT UR QL STRIP: 30 MG/DL
RBC # BLD AUTO: 3.58 M/UL (ref 4.2–5.4)
RBC # URNS HPF: ABNORMAL /HPF (ref 0–2)
RBC UR QL AUTO: ABNORMAL
SODIUM SERPL-SCNC: 137 MMOL/L (ref 135–145)
SP GR UR STRIP.AUTO: 1.01
UROBILINOGEN UR STRIP.AUTO-MCNC: 1 EU/DL
WBC # BLD AUTO: 3.8 K/UL (ref 4.8–10.8)
WBC #/AREA URNS HPF: ABNORMAL /HPF

## 2025-01-29 PROCEDURE — 81001 URINALYSIS AUTO W/SCOPE: CPT

## 2025-01-29 PROCEDURE — A9270 NON-COVERED ITEM OR SERVICE: HCPCS | Mod: UD | Performed by: STUDENT IN AN ORGANIZED HEALTH CARE EDUCATION/TRAINING PROGRAM

## 2025-01-29 PROCEDURE — 99285 EMERGENCY DEPT VISIT HI MDM: CPT

## 2025-01-29 PROCEDURE — G0378 HOSPITAL OBSERVATION PER HR: HCPCS

## 2025-01-29 PROCEDURE — 36415 COLL VENOUS BLD VENIPUNCTURE: CPT

## 2025-01-29 PROCEDURE — 85055 RETICULATED PLATELET ASSAY: CPT

## 2025-01-29 PROCEDURE — 85025 COMPLETE CBC W/AUTO DIFF WBC: CPT

## 2025-01-29 PROCEDURE — 99223 1ST HOSP IP/OBS HIGH 75: CPT | Performed by: STUDENT IN AN ORGANIZED HEALTH CARE EDUCATION/TRAINING PROGRAM

## 2025-01-29 PROCEDURE — 96374 THER/PROPH/DIAG INJ IV PUSH: CPT

## 2025-01-29 PROCEDURE — 700102 HCHG RX REV CODE 250 W/ 637 OVERRIDE(OP): Mod: UD | Performed by: STUDENT IN AN ORGANIZED HEALTH CARE EDUCATION/TRAINING PROGRAM

## 2025-01-29 PROCEDURE — 80053 COMPREHEN METABOLIC PANEL: CPT

## 2025-01-29 PROCEDURE — 94760 N-INVAS EAR/PLS OXIMETRY 1: CPT

## 2025-01-29 PROCEDURE — 700111 HCHG RX REV CODE 636 W/ 250 OVERRIDE (IP): Mod: UD | Performed by: EMERGENCY MEDICINE

## 2025-01-29 PROCEDURE — 82140 ASSAY OF AMMONIA: CPT

## 2025-01-29 RX ORDER — ATORVASTATIN CALCIUM 20 MG/1
20 TABLET, FILM COATED ORAL NIGHTLY
Status: DISCONTINUED | OUTPATIENT
Start: 2025-01-29 | End: 2025-01-31 | Stop reason: HOSPADM

## 2025-01-29 RX ORDER — HEPARIN SODIUM 5000 [USP'U]/ML
5000 INJECTION, SOLUTION INTRAVENOUS; SUBCUTANEOUS EVERY 8 HOURS
Status: DISCONTINUED | OUTPATIENT
Start: 2025-01-29 | End: 2025-01-31 | Stop reason: HOSPADM

## 2025-01-29 RX ORDER — GABAPENTIN 100 MG/1
200 CAPSULE ORAL 2 TIMES DAILY
Status: DISCONTINUED | OUTPATIENT
Start: 2025-01-29 | End: 2025-01-31 | Stop reason: HOSPADM

## 2025-01-29 RX ORDER — ENOXAPARIN SODIUM 100 MG/ML
40 INJECTION SUBCUTANEOUS DAILY
Status: DISCONTINUED | OUTPATIENT
Start: 2025-01-29 | End: 2025-01-29

## 2025-01-29 RX ORDER — FERROUS SULFATE 325(65) MG
325 TABLET ORAL
Status: DISCONTINUED | OUTPATIENT
Start: 2025-01-29 | End: 2025-01-31 | Stop reason: HOSPADM

## 2025-01-29 RX ORDER — CEFTRIAXONE 2 G/1
2000 INJECTION, POWDER, FOR SOLUTION INTRAMUSCULAR; INTRAVENOUS ONCE
Status: COMPLETED | OUTPATIENT
Start: 2025-01-29 | End: 2025-01-29

## 2025-01-29 RX ORDER — ACETAMINOPHEN 325 MG/1
650 TABLET ORAL EVERY 6 HOURS PRN
Status: DISCONTINUED | OUTPATIENT
Start: 2025-01-29 | End: 2025-01-31 | Stop reason: HOSPADM

## 2025-01-29 RX ORDER — ALBUTEROL SULFATE 90 UG/1
2 INHALANT RESPIRATORY (INHALATION) EVERY 4 HOURS PRN
Status: DISCONTINUED | OUTPATIENT
Start: 2025-01-29 | End: 2025-01-31 | Stop reason: HOSPADM

## 2025-01-29 RX ORDER — LANOLIN ALCOHOL/MO/W.PET/CERES
400 CREAM (GRAM) TOPICAL EVERY MORNING
Status: DISCONTINUED | OUTPATIENT
Start: 2025-01-30 | End: 2025-01-31 | Stop reason: HOSPADM

## 2025-01-29 RX ORDER — ASCORBIC ACID 500 MG
500 TABLET ORAL EVERY MORNING
Status: DISCONTINUED | OUTPATIENT
Start: 2025-01-30 | End: 2025-01-31 | Stop reason: HOSPADM

## 2025-01-29 RX ORDER — FUROSEMIDE 40 MG/1
40 TABLET ORAL DAILY
Status: DISCONTINUED | OUTPATIENT
Start: 2025-01-29 | End: 2025-01-31 | Stop reason: HOSPADM

## 2025-01-29 RX ORDER — SPIRONOLACTONE 50 MG/1
50 TABLET, FILM COATED ORAL DAILY
Status: DISCONTINUED | OUTPATIENT
Start: 2025-01-30 | End: 2025-01-31 | Stop reason: HOSPADM

## 2025-01-29 RX ORDER — METOPROLOL TARTRATE 25 MG/1
12.5 TABLET, FILM COATED ORAL EVERY 8 HOURS PRN
COMMUNITY

## 2025-01-29 RX ORDER — LACTULOSE 10 G/15ML
30 SOLUTION ORAL 4 TIMES DAILY
Status: DISCONTINUED | OUTPATIENT
Start: 2025-01-29 | End: 2025-01-31 | Stop reason: HOSPADM

## 2025-01-29 RX ORDER — CARVEDILOL 6.25 MG/1
6.25 TABLET ORAL 2 TIMES DAILY WITH MEALS
Status: DISCONTINUED | OUTPATIENT
Start: 2025-01-29 | End: 2025-01-31 | Stop reason: HOSPADM

## 2025-01-29 RX ORDER — OXYCODONE HYDROCHLORIDE 5 MG/1
2.5-5 TABLET ORAL EVERY 4 HOURS PRN
Status: DISCONTINUED | OUTPATIENT
Start: 2025-01-29 | End: 2025-01-31

## 2025-01-29 RX ORDER — OMEPRAZOLE 20 MG/1
20 CAPSULE, DELAYED RELEASE ORAL EVERY MORNING
Status: DISCONTINUED | OUTPATIENT
Start: 2025-01-29 | End: 2025-01-31 | Stop reason: HOSPADM

## 2025-01-29 RX ADMIN — LACTULOSE 30 ML: 10 SOLUTION ORAL at 21:20

## 2025-01-29 RX ADMIN — CARVEDILOL 6.25 MG: 6.25 TABLET, FILM COATED ORAL at 19:53

## 2025-01-29 RX ADMIN — FERROUS SULFATE TAB 325 MG (65 MG ELEMENTAL FE) 325 MG: 325 (65 FE) TAB at 21:20

## 2025-01-29 RX ADMIN — CEFTRIAXONE SODIUM 2000 MG: 2 INJECTION, POWDER, FOR SOLUTION INTRAMUSCULAR; INTRAVENOUS at 14:01

## 2025-01-29 RX ADMIN — FUROSEMIDE 40 MG: 40 TABLET ORAL at 19:54

## 2025-01-29 RX ADMIN — OXYCODONE 5 MG: 5 TABLET ORAL at 19:54

## 2025-01-29 RX ADMIN — GABAPENTIN 200 MG: 100 CAPSULE ORAL at 19:44

## 2025-01-29 RX ADMIN — OXYCODONE 5 MG: 5 TABLET ORAL at 23:57

## 2025-01-29 RX ADMIN — ATORVASTATIN CALCIUM 20 MG: 20 TABLET, FILM COATED ORAL at 21:20

## 2025-01-29 ASSESSMENT — PAIN DESCRIPTION - PAIN TYPE
TYPE: CHRONIC PAIN

## 2025-01-29 ASSESSMENT — LIFESTYLE VARIABLES
HAVE YOU EVER FELT YOU SHOULD CUT DOWN ON YOUR DRINKING: NO
ON A TYPICAL DAY WHEN YOU DRINK ALCOHOL HOW MANY DRINKS DO YOU HAVE: 0
CONSUMPTION TOTAL: NEGATIVE
EVER HAD A DRINK FIRST THING IN THE MORNING TO STEADY YOUR NERVES TO GET RID OF A HANGOVER: NO
TOTAL SCORE: 0
HOW MANY TIMES IN THE PAST YEAR HAVE YOU HAD 5 OR MORE DRINKS IN A DAY: 0
DOES PATIENT WANT TO STOP DRINKING: NO
EVER FELT BAD OR GUILTY ABOUT YOUR DRINKING: NO
ALCOHOL_USE: NO
AVERAGE NUMBER OF DAYS PER WEEK YOU HAVE A DRINK CONTAINING ALCOHOL: 0
HAVE PEOPLE ANNOYED YOU BY CRITICIZING YOUR DRINKING: NO

## 2025-01-29 ASSESSMENT — SOCIAL DETERMINANTS OF HEALTH (SDOH)
WITHIN THE LAST YEAR, HAVE TO BEEN RAPED OR FORCED TO HAVE ANY KIND OF SEXUAL ACTIVITY BY YOUR PARTNER OR EX-PARTNER?: NO
WITHIN THE PAST 12 MONTHS, THE FOOD YOU BOUGHT JUST DIDN'T LAST AND YOU DIDN'T HAVE MONEY TO GET MORE: NEVER TRUE
WITHIN THE LAST YEAR, HAVE YOU BEEN HUMILIATED OR EMOTIONALLY ABUSED IN OTHER WAYS BY YOUR PARTNER OR EX-PARTNER?: NO
WITHIN THE LAST YEAR, HAVE YOU BEEN KICKED, HIT, SLAPPED, OR OTHERWISE PHYSICALLY HURT BY YOUR PARTNER OR EX-PARTNER?: NO
WITHIN THE LAST YEAR, HAVE YOU BEEN AFRAID OF YOUR PARTNER OR EX-PARTNER?: NO
IN THE PAST 12 MONTHS, HAS THE ELECTRIC, GAS, OIL, OR WATER COMPANY THREATENED TO SHUT OFF SERVICE IN YOUR HOME?: NO
WITHIN THE PAST 12 MONTHS, YOU WORRIED THAT YOUR FOOD WOULD RUN OUT BEFORE YOU GOT THE MONEY TO BUY MORE: NEVER TRUE

## 2025-01-29 ASSESSMENT — FIBROSIS 4 INDEX: FIB4 SCORE: 5.84

## 2025-01-29 ASSESSMENT — ENCOUNTER SYMPTOMS
SHORTNESS OF BREATH: 0
ABDOMINAL PAIN: 1
FEVER: 0
HEADACHES: 1

## 2025-01-29 NOTE — ED NOTES
Pt settled into gurney, PIV placed, labs collected and sent though line does not draw well.  Tip confirmed in vein with US, vein takes hard left right at line end causing difficult draw.  Flushes beautifully.     Pt incontinent of urine. Brief changed. New brief and salvador applied.  Pt position in bed boosted for comfort. Lights turned down by request.   Pt Aox2 currently. Able to follow directions. Currently on bed alarm. Safety reviewed, Call light within reach, both side rails raised for safety.

## 2025-01-29 NOTE — ED NOTES
Lab called states samples too hemolyzed to run sample. This RN asked for phleb to come collect samples directly.     Urine sample collected and sent.

## 2025-01-29 NOTE — ASSESSMENT & PLAN NOTE
Pt on chronic oxycodone, is asking for pain meds currently   Given AMS, will start low dose oxy 2.5-5 (reg dose is 10)   Gabapentin resumed at lower dose as well 200 mg BID (previous 400 mg BID), can increase as mentation improves

## 2025-01-29 NOTE — ASSESSMENT & PLAN NOTE
History of, most recent Ha1c is normal at 5.1%   Her BG checks <150  Will not start ISS at this time, if BG consistently >180 can start

## 2025-01-29 NOTE — ED TRIAGE NOTES
Chief Complaint   Patient presents with    ALOC     Pt bib EMS from [facility] where staff reports a decline in mentation status from AO4 to AO2 over the last few days        POC glucose from

## 2025-01-29 NOTE — H&P
Hospital Medicine History & Physical Note    Date of Service  1/29/2025    Primary Care Physician  Anum Gatica M.D.    Consultants  NA    Specialist Names: NA    Code Status  Full Code    Chief Complaint  Chief Complaint   Patient presents with    ALOC     Pt bib EMS from [facility] [place] where staff reports a decline in mentation status from AO4 to AO2 over the last few days        History of Presenting Illness  April Alicia is a 64 y.o. female with a past medical history of COPD, alcoholic cirrhosis, atrial fibrillation, type 2 diabetes, left BKA who resides at [facility] and presented 1/29/2025 with altered mental status.  Per report patient has had declining mental status over the last several days thus presented for further evaluation.  On evaluation patient is awake and alert to self and place.  She has slowed mentation but answers appropriately.  She is hard of hearing.  She complains of body aches/joint pains which she says is chronic.  She denies shortness of breath, cough, chest pain, nausea vomiting or diarrhea.  She denies dysuria but does have suprapubic pain on exam.   Patient she is afebrile heart rate in the 60s, RR 15, /73 she saturating 99% on room air.  Labs remarkable for WBC of 3.8, H&H 10.4/32.3 and platelets 41.  Bicarb 16, BUN 33 ammonia elevated at 120  UA is positive for nitrite leukocyte esterase WBC and many bacteria.    Patient admitted for UTI and hepatic encephalopathy.    I discussed the plan of care with patient.    Review of Systems  Review of Systems   Constitutional:  Positive for malaise/fatigue. Negative for fever.   Respiratory:  Negative for shortness of breath.    Cardiovascular:  Negative for chest pain.   Gastrointestinal:  Positive for abdominal pain.   Genitourinary:  Positive for frequency. Negative for dysuria.   Musculoskeletal:  Positive for joint pain.   Neurological:  Positive for headaches.       Past Medical History   has a past medical history of  Abnormal finding of lung (12/27/2022), Acute exacerbation of chronic obstructive pulmonary disease (COPD) ([facility]) (01/27/2020), Alcoholic cirrhosis ([facility]), Arthritis, ASTHMA, Atrial fibrillation ([facility]), Dry eyes (11/16/2017), Edentulous, Emphysema of lung ([facility]), H/O: hysterectomy (12/05/2019), Heart burn, Hepatic encephalopathy ([facility]) (12/27/2022), Hepatitis C infection (07/28/2022), History of rheumatic fever (09/04/2017), Hypertension, 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[facility]), Medication overuse headache (08/15/2017), Mitral regurgitation, Pancytopenia ([facility]) (07/26/2022), Pneumonia (02/2020), Primary osteoarthritis of left knee (08/25/2020), Prosthetic joint infection ([facility]) (2018), Protein-calorie malnutrition, severe ([facility]) (08/01/2022), Right leg DVT ([facility]) (2018), Seizure disorder ([facility]), Septic arthritis of knee, left ([facility]) (07/19/2022), Septic arthritis of shoulder, left ([facility]) (07/17/2022), Septic shock due to Pseudomonas species ([facility]) (10/30/2023), and Type 2 diabetes mellitus ([facility]) (12/27/2022).    Surgical History   has a past surgical history that includes gyn surgery (1982); gyn surgery (2003); colonoscopy with clipping (10/28/2015); colonoscopy with sclerotherapy (10/28/2015); colonoscopy with tattooing (10/28/2015); irrigation & debridement ortho (Right, 9/3/2017); knee arthroplasty total (Right, 2018); pr total knee arthroplasty (Left, 8/24/2020); irrigation & debridement general (Left, 7/17/2022); pr revise knee joint replace,all parts (Left, 7/19/2022); pr revise knee joint replace,all parts (Left, 12/28/2022); irrigation & debridement general (Left, 12/28/2022); pr total knee arthroplasty (Left, 10/30/2023); knee amputation above (Left, 1/16/2024); and pr upper gi endoscopy,diagnosis (N/A, 11/27/2024).     Family History  family history includes Alcohol abuse in her brother; Dementia in her brother; Diabetes in her brother, brother, and mother; Heart Attack (age of onset: 45) in her father; Seizures in her  father.   Family history reviewed with patient. There is no family history that is pertinent to the chief complaint.     Social History   reports that she quit smoking about 8 years ago. Her smoking use included cigarettes. She quit smokeless tobacco use about 3 years ago. She reports that she does not currently use alcohol. She reports that she does not currently use drugs.    Allergies  Allergies   Allergen Reactions    Meropenem Swelling     Pt had rxt to meropenem June 2024 (marked tongue swelling and right facial/periorbital edema)    Tuberculin Tests Unspecified     Per MAR from [facility]       Medications  Prior to Admission Medications   Prescriptions Last Dose Informant Patient Reported? Taking?   Dextrose, Diabetic Use, (GLUCOSE) 77.4 % Gel  MAR from Other Facility Yes No   Sig: Take 1 Tube by mouth see administration instructions. For BS < 60   acetaminophen (TYLENOL) 325 MG Tab  MAR from Other Facility Yes No   Sig: Take 650 mg by mouth every 6 hours as needed for Mild Pain.   albuterol 108 (90 Base) MCG/ACT Aero Soln inhalation aerosol  MAR from Other Facility Yes No   Sig: Inhale 2 Puffs every four hours as needed for Shortness of Breath.   ascorbic acid (ASCORBIC ACID) 500 MG Tab  MAR from Other Facility Yes No   Sig: Take 500 mg by mouth every morning.   atorvastatin (LIPITOR) 20 MG Tab  MAR from Other Facility No No   Sig: Take 1 Tablet by mouth every evening.   carvedilol (COREG) 6.25 MG Tab  MAR from Other Facility Yes No   Sig: Take 6.25 mg by mouth 2 times a day with meals.   ferrous sulfate 325 (65 Fe) MG tablet  MAR from Other Facility Yes No   Sig: Take 325 mg by mouth at bedtime.   furosemide (LASIX) 40 MG Tab  MAR from Other Facility No No   Sig: Take 1 Tablet by mouth every day.   gabapentin (NEURONTIN) 400 MG Cap  MAR from Other Facility Yes No   Sig: Take 400 mg by mouth 2 times a day.   insulin regular (HUMULIN R/NOVOLIN R) 100 Unit/mL Solution  MAR from Other Facility Yes No   Sig:  Inject 1-10 Units under the skin 4 Times a Day,Before Meals and at Bedtime. Inject per sliding scale. For blood glucose:  151 - 200 = 1 unit  201 - 250 = 3 units  251 - 300 = 5 units  301 - 350 = 7 units  351 - 400 = 8 units  If >400 = 10 units and call M.D.   ipratropium-albuterol (DUONEB) 0.5-2.5 (3) MG/3ML nebulizer solution  MAR from Other Facility Yes No   Sig: Take 3 mL by nebulization every four hours as needed for Shortness of Breath.   lactulose 20 GM/30ML Solution  MAR from Other Facility No No   Sig: Take 30 mL by mouth 3 times a day. Hold dose if 4 or more soft or loose stools in the last 24 hours, please gradually increase amount per dose by 5mls if you do not have at least 2 stools per day.   magnesium oxide (MAG-OX) 400 MG Tab tablet  MAR from Other Facility Yes No   Sig: Take 400 mg by mouth every morning.   oxyCODONE immediate release (ROXICODONE) 10 MG immediate release tablet  MAR from Other Facility Yes No   Sig: Take 10 mg by mouth every 6 hours as needed for Moderate Pain.   pantoprazole (PROTONIX) 20 MG tablet  MAR from Other Facility Yes No   Sig: Take 20 mg by mouth every morning.   spironolactone (ALDACTONE) 50 MG Tab  MAR from Other Facility Yes No   Sig: Take 50 mg by mouth every day.      Facility-Administered Medications: None       Physical Exam  Temp:  [36.2 °C (97.1 °F)] 36.2 °C (97.1 °F)  Pulse:  [64-65] 65  Resp:  [15-20] 20  BP: (108-139)/(72-73) 108/72  SpO2:  [98 %-99 %] 98 %  Blood Pressure: 108/72   Temperature: 36.2 °C (97.1 °F)   Pulse: 65   Respiration: 20   Pulse Oximetry: 98 %       Physical Exam  Vitals and nursing note reviewed.   Constitutional:       Appearance: She is normal weight. She is ill-appearing.      Comments: Chronically ill appearing female, appears older than stated age   HENT:      Head: Normocephalic and atraumatic.      Mouth/Throat:      Mouth: Mucous membranes are dry.      Comments: edentulous  Eyes:      Conjunctiva/sclera: Conjunctivae normal.  "  Cardiovascular:      Rate and Rhythm: Normal rate. Rhythm irregular.      Heart sounds: Normal heart sounds.   Pulmonary:      Effort: Pulmonary effort is normal.      Breath sounds: Normal breath sounds.   Abdominal:      General: Bowel sounds are normal.      Palpations: Abdomen is soft.      Tenderness: There is abdominal tenderness. There is no guarding or rebound.      Comments: Suprapubic tenderness on exam   Musculoskeletal:      Cervical back: Neck supple.      Comments: Left BKA   Skin:     General: Skin is warm.      Capillary Refill: Capillary refill takes 2 to 3 seconds.      Comments: Skin grafting to Rt LE   Neurological:      Mental Status: She is alert. She is disoriented.      Cranial Nerves: No cranial nerve deficit.   Psychiatric:         Mood and Affect: Mood normal.         Laboratory:  Recent Labs     01/29/25  1045   WBC 3.8*   RBC 3.58*   HEMOGLOBIN 10.4*   HEMATOCRIT 32.3*   MCV 90.2   MCH 29.1   MCHC 32.2   RDW 53.3*   PLATELETCT 41*     Recent Labs     01/29/25  1206   SODIUM 137   POTASSIUM 5.1   CHLORIDE 111   CO2 16*   GLUCOSE 109*   BUN 33*   CREATININE 0.67   CALCIUM 8.5     Recent Labs     01/29/25  1206   ALTSGPT 12   ASTSGOT 15   ALKPHOSPHAT 167*   TBILIRUBIN 1.1   GLUCOSE 109*         No results for input(s): \"NTPROBNP\" in the last 72 hours.      No results for input(s): \"TROPONINT\" in the last 72 hours.    Imaging:  No orders to display       no X-Ray or EKG requiring interpretation    Assessment/Plan:  Justification for Admission Status  I anticipate this patient is appropriate for observation status at this time because UTI and treatment of hepatic encephalopathy    Patient will need a Med/Surg bed on MEDICAL service .  The need is secondary to UTI/hepatic encephlaopthy.    * Hepatic encephalopathy (HCC)- (present on admission)  Assessment & Plan  Hx of alcohol cirrhosis   Presents with slowed mentation, not at her baseline, likely due to hepatic encephalopathy   Lactulose " started, pt reports taking at SNF  If no improvement consider adding rifaximin   Delirium precautions   Fall precautions     UTI (urinary tract infection)- (present on admission)  Assessment & Plan  UA pos for Nitrite, LE, WBC and bacteria   Follow up urine Cx   Given rocephin in the ER, will continue     Narcotic dependence (HCC)- (present on admission)  Assessment & Plan  Pt on chronic oxycodone, is asking for pain meds currently   Given AMS, will start low dose oxy 2.5-5 (reg dose is 10)   Gabapentin resumed at lower dose as well 200 mg BID (previous 400 mg BID), can increase as mentation improves     Metabolic acidosis- (present on admission)  Assessment & Plan  Bicarb 16, renal function stable, BUN is elevated   Continue lasix/aldactone for now  Monitor labs     Type 2 diabetes mellitus with diabetic neuropathy, with long-term current use of insulin (HCC)- (present on admission)  Assessment & Plan  History of, most recent Ha1c is normal at 5.1%   Her BG checks <150  Will not start ISS at this time, if BG consistently >180 can start     Paroxysmal atrial fibrillation- (present on admission)  Assessment & Plan  Chronic A.fib, rate controlled   Not on AC due to risk of bleeding, low plt   Stable     Thrombocytopenia (HCC)- (present on admission)  Assessment & Plan  Chronic, stable, from cirrhosis   No signs of bleeding   Monitor         VTE prophylaxis: heparin ppx

## 2025-01-29 NOTE — ED NOTES
Vi from Lab called with critical result of ammonis of 120 at 1304. Critical lab result read back to Vi.   Dr. Jesus notified of critical lab result at 1304.  Critical lab result read back by Dr. Jesus.

## 2025-01-29 NOTE — ASSESSMENT & PLAN NOTE
UA pos for Nitrite, LE, WBC and bacteria   Follow up urine Cx   Given rocephin in the ER, will continue

## 2025-01-29 NOTE — ED NOTES
Report called to Elisha MONTAÑO. Pt's daughter escorted to bedside and notified about room upstairs.

## 2025-01-29 NOTE — ASSESSMENT & PLAN NOTE
Hx of alcohol cirrhosis   Presents with slowed mentation, not at her baseline, likely due to hepatic encephalopathy   Lactulose started, pt reports taking at SNF  If no improvement consider adding rifaximin   Delirium precautions   Fall precautions

## 2025-01-29 NOTE — ED PROVIDER NOTES
ER Provider Note    Scribed for Nima Jesus M.D. by Aleisha Mclean. 1/29/2025   10:33 AM    Primary Care Provider: Anum Gatica M.D.    CHIEF COMPLAINT  Chief Complaint   Patient presents with    ALOC     Pt bib EMS from [facility] where staff reports a decline in mentation status from AO4 to AO2 over the last few days      EXTERNAL RECORDS REVIEWED  Inpatient Notes Records review show that the patient was recently admitted for AMS due to hepatic encephalopathy.    HPI/ROS  LIMITATION TO HISTORY   Select: Altered mental status / Confusion  OUTSIDE HISTORIAN(S):  RN    April Alicia is a 64 y.o. female who presents to the ED via EMS from [facility] for evaluation of ALOC. Per triage note, the staff at [facility] report that the patient's mentation status has declined from A&ox4 to A&Ox2 over the last couple of days. RN report that the paramedics had told her that they suspect her ammonia is increasing. No alleviating or exacerbating factors were noted.The patient has a history of type two diabetes and hypertension.     PAST MEDICAL HISTORY  Past Medical History:   Diagnosis Date    Abnormal finding of lung 12/27/2022    Acute exacerbation of chronic obstructive pulmonary disease (COPD) (HCC) 01/27/2020    Alcoholic cirrhosis (HCC)     Arthritis     OA in knees    ASTHMA     Atrial fibrillation (HCC)     Dry eyes 11/16/2017    Edentulous     upper and lower dentures    Emphysema of lung (HCC)     H/O: hysterectomy 12/05/2019    Heart burn     left knee    Hepatic encephalopathy (HCC) 12/27/2022    Hepatitis C infection 07/28/2022    History of rheumatic fever 09/04/2017    Hypertension     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HCC)     Recurrent, L knee, x4 with surgical washout    Medication overuse headache 08/15/2017    Mitral regurgitation     Pancytopenia (HCC) 07/26/2022    Pneumonia 02/2020    Primary osteoarthritis of left knee 08/25/2020    Prosthetic joint infection (HCC) 2018    Right knee after  total knee    Protein-calorie malnutrition, severe (HCC) 08/01/2022    Right leg DVT (Formerly [facility]) 2018    acute, resolved    Seizure disorder (Formerly [facility])     Septic arthritis of knee, left (HCC) 07/19/2022    Septic arthritis of shoulder, left ([facility]) 07/17/2022    Septic shock due to Pseudomonas species ([facility]ly [facility]) 10/30/2023    Type 2 diabetes mellitus ([facility]) 12/27/2022     SURGICAL HISTORY  Past Surgical History:   Procedure Laterality Date    MN UPPER GI ENDOSCOPY,DIAGNOSIS N/A 11/27/2024    Procedure: GASTROSCOPY;  Surgeon: Jose Guadalupe Velázquez M.D.;  Location: SURGERY SAME DAY [facility];  Service: Gastroenterology    KNEE AMPUTATION ABOVE Left 1/16/2024    Procedure: LEFT ABOVE KNEE AMPUTATION;  Surgeon: Obdulio Gray M.D.;  Location: [facility];  Service: Orthopedics    PB TOTAL KNEE ARTHROPLASTY Left 10/30/2023    Procedure: LEFT TOTAL KNEE ARTHROPLASTY EXPLANTATION, INSERTION OF ANTIBIOTIC SPACER, AND APPLICATION OF INCISIONAL WOUND VACUUM;  Surgeon: Wild Luz M.D.;  Location: [facility];  Service: Orthopedics    PB REVISE KNEE JOINT REPLACE,ALL PARTS Left 12/28/2022    Procedure: REVISION, TOTAL ARTHROPLASTY, KNEE, ALL COMPONENTS-LEFT;  Surgeon: Wild Lzu M.D.;  Location: [facility];  Service: Orthopedics    IRRIGATION & DEBRIDEMENT GENERAL Left 12/28/2022    Procedure: IRRIGATION AND DEBRIDEMENT, WOUND;  Surgeon: Wild Luz M.D.;  Location: [facility];  Service: Orthopedics    PB REVISE KNEE JOINT REPLACE,ALL PARTS Left 7/19/2022    Procedure: REVISION, TOTAL ARTHROPLASTY, KNEE, ALL COMPONENTS - ANTIBIOTIC SPACER;  Surgeon: Wild Luz M.D.;  Location: [facility];  Service: Orthopedics    IRRIGATION & DEBRIDEMENT GENERAL Left 7/17/2022    Procedure: IRRIGATION AND DEBRIDEMENT, SHOULDER;  Surgeon: Obdulio Gray M.D.;  Location: [facility];  Service: Orthopedics    PB TOTAL KNEE ARTHROPLASTY Left 8/24/2020    Procedure: ARTHROPLASTY, KNEE, TOTAL;   Surgeon: Víctor Faustin M.D.;  Location: SURGERY [facility];  Service: Orthopedics    KNEE ARTHROPLASTY TOTAL Right 2018    IRRIGATION & DEBRIDEMENT ORTHO Right 9/3/2017    Procedure: IRRIGATION & DEBRIDEMENT ORTHO, POLY EXCHANGE;  Surgeon: Jerome Russell M.D.;  Location: SURGERY [facility];  Service: Orthopedics    COLONOSCOPY WITH CLIPPING  10/28/2015    Procedure: COLONOSCOPY WITH CLIPPING;  Surgeon: Ruben Colon M.D.;  Location: ENDOSCOPY [facility];  Service:     COLONOSCOPY WITH SCLEROTHERAPY  10/28/2015    Procedure: COLONOSCOPY WITH SCLEROTHERAPY;  Surgeon: Ruben Colon M.D.;  Location: ENDOSCOPY [facility];  Service:     COLONOSCOPY WITH TATTOOING  10/28/2015    Procedure: COLONOSCOPY WITH TATTOOING;  Surgeon: Ruben Colon M.D.;  Location: ENDOSCOPY [facility];  Service:     GYN SURGERY  2003    hysteroscoopy    GYN SURGERY  1982    tubal ligation     FAMILY HISTORY  Family History   Problem Relation Age of Onset    Diabetes Mother     Seizures Father     Heart Attack Father 45    Diabetes Brother     Alcohol abuse Brother     Dementia Brother     Diabetes Brother      SOCIAL HISTORY   reports that she quit smoking about 8 years ago. Her smoking use included cigarettes. She quit smokeless tobacco use about 3 years ago. She reports that she does not currently use alcohol. She reports that she does not currently use drugs.    CURRENT MEDICATIONS  Previous Medications    ACETAMINOPHEN (TYLENOL) 325 MG TAB    Take 650 mg by mouth every 6 hours as needed for Mild Pain.    ALBUTEROL 108 (90 BASE) MCG/ACT AERO SOLN INHALATION AEROSOL    Inhale 2 Puffs every four hours as needed for Shortness of Breath.    ASCORBIC ACID (ASCORBIC ACID) 500 MG TAB    Take 500 mg by mouth every morning.    ATORVASTATIN (LIPITOR) 20 MG TAB    Take 1 Tablet by mouth every evening.    CARVEDILOL (COREG) 6.25 MG TAB    Take 6.25 mg by mouth 2 times a day with meals.    DEXTROSE, DIABETIC USE,  "(GLUCOSE) 77.4 % GEL    Take 1 Tube by mouth see administration instructions. For BS < 60    FERROUS SULFATE 325 (65 FE) MG TABLET    Take 325 mg by mouth at bedtime.    FUROSEMIDE (LASIX) 40 MG TAB    Take 1 Tablet by mouth every day.    GABAPENTIN (NEURONTIN) 400 MG CAP    Take 400 mg by mouth 2 times a day.    INSULIN REGULAR (HUMULIN R/NOVOLIN R) 100 UNIT/ML SOLUTION    Inject 1-10 Units under the skin 4 Times a Day,Before Meals and at Bedtime. Inject per sliding scale. For blood glucose:  151 - 200 = 1 unit  201 - 250 = 3 units  251 - 300 = 5 units  301 - 350 = 7 units  351 - 400 = 8 units  If >400 = 10 units and call M.D.    IPRATROPIUM-ALBUTEROL (DUONEB) 0.5-2.5 (3) MG/3ML NEBULIZER SOLUTION    Take 3 mL by nebulization every four hours as needed for Shortness of Breath.    LACTULOSE 20 GM/30ML SOLUTION    Take 30 mL by mouth 3 times a day. Hold dose if 4 or more soft or loose stools in the last 24 hours, please gradually increase amount per dose by 5mls if you do not have at least 2 stools per day.    MAGNESIUM OXIDE (MAG-OX) 400 MG TAB TABLET    Take 400 mg by mouth every morning.    OXYCODONE IMMEDIATE RELEASE (ROXICODONE) 10 MG IMMEDIATE RELEASE TABLET    Take 10 mg by mouth every 6 hours as needed for Moderate Pain.    PANTOPRAZOLE (PROTONIX) 20 MG TABLET    Take 20 mg by mouth every morning.    SPIRONOLACTONE (ALDACTONE) 50 MG TAB    Take 50 mg by mouth every day.     ALLERGIES  Allergies   Allergen Reactions    Meropenem Swelling     Pt had rxt to meropenem June 2024 (marked tongue swelling and right facial/periorbital edema)    Tuberculin Tests Unspecified     Per MAR from [facility]        PHYSICAL EXAM  /73   Pulse 64   Temp 36.2 °C (97.1 °F) (Temporal)   Resp 15   Ht 1.727 m (5' 8\")   Wt 76.2 kg (168 lb)   LMP 06/02/2008   SpO2 99%   BMI 25.54 kg/m²      Nursing note and vitals reviewed.  Constitutional: Well-developed and well-nourished. No distress.   HENT: Head is normocephalic and " atraumatic. Oropharynx is clear and moist without exudate or erythema.   Eyes: Pupils are equal, round, and reactive to light. Conjunctiva are normal.   Cardiovascular: Normal rate and regular rhythm. No murmur heard. Normal radial pulses.  Pulmonary/Chest: Breath sounds normal. No wheezes or rales.   Abdominal: No ascites or abdominal tenderness, Soft. No distention    Musculoskeletal: Extremities exhibit normal range of motion without edema or tenderness.   Neurological: A&Ox1, Seems confused, Somewhat slow to respond, Awake. No focal deficits noted.  Skin: Skin is warm and dry. No rash.   Psychiatric: Normal mood and affect. Appropriate for clinical situation    DIAGNOSTIC STUDIES    Labs:   Results for orders placed or performed during the hospital encounter of 01/29/25   CBC WITH DIFFERENTIAL    Collection Time: 01/29/25 10:45 AM   Result Value Ref Range    WBC 3.8 (L) 4.8 - 10.8 K/uL    RBC 3.58 (L) 4.20 - 5.40 M/uL    Hemoglobin 10.4 (L) 12.0 - 16.0 g/dL    Hematocrit 32.3 (L) 37.0 - 47.0 %    MCV 90.2 81.4 - 97.8 fL    MCH 29.1 27.0 - 33.0 pg    MCHC 32.2 32.2 - 35.5 g/dL    RDW 53.3 (H) 35.9 - 50.0 fL    Platelet Count 41 (L) 164 - 446 K/uL    Neutrophils-Polys 54.70 44.00 - 72.00 %    Lymphocytes 33.30 22.00 - 41.00 %    Monocytes 5.80 0.00 - 13.40 %    Eosinophils 4.80 0.00 - 6.90 %    Basophils 1.10 0.00 - 1.80 %    Immature Granulocytes 0.30 0.00 - 0.90 %    Nucleated RBC 0.00 0.00 - 0.20 /100 WBC    Neutrophils (Absolute) 2.07 1.82 - 7.42 K/uL    Lymphs (Absolute) 1.26 1.00 - 4.80 K/uL    Monos (Absolute) 0.22 0.00 - 0.85 K/uL    Eos (Absolute) 0.18 0.00 - 0.51 K/uL    Baso (Absolute) 0.04 0.00 - 0.12 K/uL    Immature Granulocytes (abs) 0.01 0.00 - 0.11 K/uL    NRBC (Absolute) 0.00 K/uL   IMMATURE PLT FRACTION    Collection Time: 01/29/25 10:45 AM   Result Value Ref Range    Imm. Plt Fraction 1.6 0.6 - 13.1 %   URINALYSIS (UA)    Collection Time: 01/29/25 11:29 AM    Specimen: Urine   Result Value Ref  Range    Color Yellow     Character Clear     Specific Gravity 1.014 <1.035    Ph 8.5 (A) 5.0 - 8.0    Glucose Negative Negative mg/dL    Ketones Negative Negative mg/dL    Protein 30 (A) Negative mg/dL    Bilirubin Negative Negative    Urobilinogen, Urine 1.0 <=1.0 EU/dL    Nitrite Positive (A) Negative    Leukocyte Esterase Large (A) Negative    Occult Blood Large (A) Negative    Micro Urine Req Microscopic    URINE MICROSCOPIC (W/UA)    Collection Time: 01/29/25 11:29 AM   Result Value Ref Range    WBC 21-50 (A) /hpf    RBC 3-5 (A) 0 - 2 /hpf    Bacteria Many (A) None /hpf    Epithelial Cells 0-2 0 - 5 /hpf    Urine Casts 0-2 0 - 2 /lpf   Comp Metabolic Panel    Collection Time: 01/29/25 12:06 PM   Result Value Ref Range    Sodium 137 135 - 145 mmol/L    Potassium 5.1 3.6 - 5.5 mmol/L    Chloride 111 96 - 112 mmol/L    Co2 16 (L) 20 - 33 mmol/L    Anion Gap 10.0 7.0 - 16.0    Glucose 109 (H) 65 - 99 mg/dL    Bun 33 (H) 8 - 22 mg/dL    Creatinine 0.67 0.50 - 1.40 mg/dL    Calcium 8.5 8.5 - 10.5 mg/dL    Correct Calcium 9.6 8.5 - 10.5 mg/dL    AST(SGOT) 15 12 - 45 U/L    ALT(SGPT) 12 2 - 50 U/L    Alkaline Phosphatase 167 (H) 30 - 99 U/L    Total Bilirubin 1.1 0.1 - 1.5 mg/dL    Albumin 2.6 (L) 3.2 - 4.9 g/dL    Total Protein 6.9 6.0 - 8.2 g/dL    Globulin 4.3 (H) 1.9 - 3.5 g/dL    A-G Ratio 0.6 g/dL   AMMONIA    Collection Time: 01/29/25 12:06 PM   Result Value Ref Range    Ammonia 120 (HH) 11 - 45 umol/L   ESTIMATED GFR    Collection Time: 01/29/25 12:06 PM   Result Value Ref Range    GFR (CKD-EPI) 97 >60 mL/min/1.73 m 2     *Note: Due to a large number of results and/or encounters for the requested time period, some results have not been displayed. A complete set of results can be found in Results Review.       INITIAL ASSESSMENT AND PLAN    10:33 AM - Patient was evaluated at bedside. Ordered for Urine microscopic, CBC with diff, UA to evaluate. Patient verbalizes understanding and support with my plan of care.   Differential diagnoses include but not limited to: UTI, Hepatic encephalopathy, Electrolyte abnormality.     ED Observation Status? No; Patient does not meet criteria for ED Observation.      COURSE AND MEDICAL DECISION MAKING    12:11 PM - Patient was medicated with Rocephin injection 2,000 mg.    Ammonia is elevated.  Urinalysis consistent urinary tract infection.  Patient's presentation is consistent with multifactorial altered mental status related to urinary tract infection and hepatic encephalopathy.    1:12 PM - I discussed the patient's case and the above findings with Dr. Greco (Hospitalist) who agrees to evaluate the patient for admission.      DISPOSITION AND DISCUSSIONS    I have discussed management of the patient with the following physicians and CANDIDO's:  Dr. Greco (Hospitalist)     Discussion of management with other [facility] or appropriate source(s): None     DISPOSITION:  Patient will be hospitalized by Dr. Greco in guarded condition.     FINAL DIAGNOSIS  1. Altered mental status, unspecified altered mental status type    2. Urinary tract infection without hematuria, site unspecified    3. Hepatic encephalopathy (HCC)         IAleisha (Lisandro), am scribing for, and in the presence of, Nima Jesus M.D..    Electronically signed by: Aleisha Mclean (Lisandro), 1/29/2025    INima M.D. personally performed the services described in this documentation, as scribed by Aleisha Mclean in my presence, and it is both accurate and complete.      The note accurately reflects work and decisions made by me.  Nima Jesus M.D.  1/29/2025  1:23 PM

## 2025-01-29 NOTE — ASSESSMENT & PLAN NOTE
Bicarb 16, renal function stable, BUN is elevated   Continue lasix/aldactone for now  Monitor labs

## 2025-01-29 NOTE — ED NOTES
Pt medicated for UTI. Given meat free snacks and water from ED.  Purewick remains patent.   Call light within reach. Lights turned down for pt.

## 2025-01-30 PROBLEM — R41.82 ALTERED MENTAL STATUS: Status: ACTIVE | Noted: 2025-01-30

## 2025-01-30 LAB
ALBUMIN SERPL BCP-MCNC: 2.8 G/DL (ref 3.2–4.9)
ALBUMIN/GLOB SERPL: 0.8 G/DL
ALP SERPL-CCNC: 175 U/L (ref 30–99)
ALT SERPL-CCNC: 10 U/L (ref 2–50)
AMMONIA PLAS-SCNC: 260 UMOL/L (ref 11–45)
ANION GAP SERPL CALC-SCNC: 11 MMOL/L (ref 7–16)
AST SERPL-CCNC: 16 U/L (ref 12–45)
BILIRUB SERPL-MCNC: 0.6 MG/DL (ref 0.1–1.5)
BUN SERPL-MCNC: 36 MG/DL (ref 8–22)
CALCIUM ALBUM COR SERPL-MCNC: 8.9 MG/DL (ref 8.5–10.5)
CALCIUM SERPL-MCNC: 7.9 MG/DL (ref 8.5–10.5)
CHLORIDE SERPL-SCNC: 109 MMOL/L (ref 96–112)
CO2 SERPL-SCNC: 16 MMOL/L (ref 20–33)
CREAT SERPL-MCNC: 0.85 MG/DL (ref 0.5–1.4)
ERYTHROCYTE [DISTWIDTH] IN BLOOD BY AUTOMATED COUNT: 54 FL (ref 35.9–50)
GFR SERPLBLD CREATININE-BSD FMLA CKD-EPI: 76 ML/MIN/1.73 M 2
GLOBULIN SER CALC-MCNC: 3.6 G/DL (ref 1.9–3.5)
GLUCOSE SERPL-MCNC: 171 MG/DL (ref 65–99)
HCT VFR BLD AUTO: 30.8 % (ref 37–47)
HGB BLD-MCNC: 9.9 G/DL (ref 12–16)
MCH RBC QN AUTO: 29 PG (ref 27–33)
MCHC RBC AUTO-ENTMCNC: 32.1 G/DL (ref 32.2–35.5)
MCV RBC AUTO: 90.3 FL (ref 81.4–97.8)
PLATELET # BLD AUTO: 63 K/UL (ref 164–446)
PLATELETS.RETICULATED NFR BLD AUTO: 1.1 % (ref 0.6–13.1)
PMV BLD AUTO: 10.2 FL (ref 9–12.9)
POTASSIUM SERPL-SCNC: 4.9 MMOL/L (ref 3.6–5.5)
PROT SERPL-MCNC: 6.4 G/DL (ref 6–8.2)
RBC # BLD AUTO: 3.41 M/UL (ref 4.2–5.4)
SODIUM SERPL-SCNC: 136 MMOL/L (ref 135–145)
WBC # BLD AUTO: 5.4 K/UL (ref 4.8–10.8)

## 2025-01-30 PROCEDURE — 80053 COMPREHEN METABOLIC PANEL: CPT

## 2025-01-30 PROCEDURE — 700105 HCHG RX REV CODE 258: Mod: UD | Performed by: STUDENT IN AN ORGANIZED HEALTH CARE EDUCATION/TRAINING PROGRAM

## 2025-01-30 PROCEDURE — 82140 ASSAY OF AMMONIA: CPT

## 2025-01-30 PROCEDURE — 85055 RETICULATED PLATELET ASSAY: CPT

## 2025-01-30 PROCEDURE — 36415 COLL VENOUS BLD VENIPUNCTURE: CPT

## 2025-01-30 PROCEDURE — A9270 NON-COVERED ITEM OR SERVICE: HCPCS | Performed by: INTERNAL MEDICINE

## 2025-01-30 PROCEDURE — A9270 NON-COVERED ITEM OR SERVICE: HCPCS | Mod: UD | Performed by: STUDENT IN AN ORGANIZED HEALTH CARE EDUCATION/TRAINING PROGRAM

## 2025-01-30 PROCEDURE — 85027 COMPLETE CBC AUTOMATED: CPT

## 2025-01-30 PROCEDURE — 99233 SBSQ HOSP IP/OBS HIGH 50: CPT | Performed by: INTERNAL MEDICINE

## 2025-01-30 PROCEDURE — 770006 HCHG ROOM/CARE - MED/SURG/GYN SEMI*

## 2025-01-30 PROCEDURE — 96376 TX/PRO/DX INJ SAME DRUG ADON: CPT

## 2025-01-30 PROCEDURE — 700111 HCHG RX REV CODE 636 W/ 250 OVERRIDE (IP): Mod: UD | Performed by: STUDENT IN AN ORGANIZED HEALTH CARE EDUCATION/TRAINING PROGRAM

## 2025-01-30 PROCEDURE — 700102 HCHG RX REV CODE 250 W/ 637 OVERRIDE(OP): Mod: UD | Performed by: STUDENT IN AN ORGANIZED HEALTH CARE EDUCATION/TRAINING PROGRAM

## 2025-01-30 PROCEDURE — 700102 HCHG RX REV CODE 250 W/ 637 OVERRIDE(OP): Performed by: INTERNAL MEDICINE

## 2025-01-30 RX ADMIN — CEFTRIAXONE SODIUM 1000 MG: 10 INJECTION, POWDER, FOR SOLUTION INTRAVENOUS at 06:20

## 2025-01-30 RX ADMIN — ATORVASTATIN CALCIUM 20 MG: 20 TABLET, FILM COATED ORAL at 21:35

## 2025-01-30 RX ADMIN — ACETAMINOPHEN 650 MG: 325 TABLET ORAL at 01:34

## 2025-01-30 RX ADMIN — Medication 400 MG: at 06:20

## 2025-01-30 RX ADMIN — GABAPENTIN 200 MG: 100 CAPSULE ORAL at 06:00

## 2025-01-30 RX ADMIN — OXYCODONE 5 MG: 5 TABLET ORAL at 23:56

## 2025-01-30 RX ADMIN — LACTULOSE 30 ML: 10 SOLUTION ORAL at 09:50

## 2025-01-30 RX ADMIN — OXYCODONE 5 MG: 5 TABLET ORAL at 07:43

## 2025-01-30 RX ADMIN — LACTULOSE 30 ML: 10 SOLUTION ORAL at 21:35

## 2025-01-30 RX ADMIN — HEPARIN SODIUM 5000 UNITS: 5000 INJECTION, SOLUTION INTRAVENOUS; SUBCUTANEOUS at 21:35

## 2025-01-30 RX ADMIN — HEPARIN SODIUM 5000 UNITS: 5000 INJECTION, SOLUTION INTRAVENOUS; SUBCUTANEOUS at 14:43

## 2025-01-30 RX ADMIN — OXYCODONE 5 MG: 5 TABLET ORAL at 14:43

## 2025-01-30 RX ADMIN — GABAPENTIN 200 MG: 100 CAPSULE ORAL at 17:20

## 2025-01-30 RX ADMIN — RIFAXIMIN 550 MG: 550 TABLET ORAL at 14:43

## 2025-01-30 RX ADMIN — FERROUS SULFATE TAB 325 MG (65 MG ELEMENTAL FE) 325 MG: 325 (65 FE) TAB at 21:35

## 2025-01-30 RX ADMIN — OXYCODONE 5 MG: 5 TABLET ORAL at 19:53

## 2025-01-30 RX ADMIN — OMEPRAZOLE 20 MG: 20 CAPSULE, DELAYED RELEASE ORAL at 06:20

## 2025-01-30 RX ADMIN — OXYCODONE HYDROCHLORIDE AND ACETAMINOPHEN 500 MG: 500 TABLET ORAL at 06:20

## 2025-01-30 RX ADMIN — CARVEDILOL 6.25 MG: 6.25 TABLET, FILM COATED ORAL at 07:44

## 2025-01-30 RX ADMIN — CARVEDILOL 6.25 MG: 6.25 TABLET, FILM COATED ORAL at 17:20

## 2025-01-30 ASSESSMENT — COGNITIVE AND FUNCTIONAL STATUS - GENERAL
SUGGESTED CMS G CODE MODIFIER DAILY ACTIVITY: CK
CLIMB 3 TO 5 STEPS WITH RAILING: TOTAL
TOILETING: A LOT
MOVING TO AND FROM BED TO CHAIR: A LOT
STANDING UP FROM CHAIR USING ARMS: A LOT
SUGGESTED CMS G CODE MODIFIER MOBILITY: CL
MOVING FROM LYING ON BACK TO SITTING ON SIDE OF FLAT BED: A LOT
DRESSING REGULAR UPPER BODY CLOTHING: A LITTLE
STANDING UP FROM CHAIR USING ARMS: A LOT
DRESSING REGULAR LOWER BODY CLOTHING: A LOT
TOILETING: A LOT
DRESSING REGULAR UPPER BODY CLOTHING: A LOT
MOVING TO AND FROM BED TO CHAIR: TOTAL
DAILY ACTIVITIY SCORE: 12
SUGGESTED CMS G CODE MODIFIER DAILY ACTIVITY: CL
PERSONAL GROOMING: A LOT
WALKING IN HOSPITAL ROOM: TOTAL
SUGGESTED CMS G CODE MODIFIER MOBILITY: CL
DRESSING REGULAR LOWER BODY CLOTHING: A LOT
EATING MEALS: A LOT
TURNING FROM BACK TO SIDE WHILE IN FLAT BAD: A LITTLE
HELP NEEDED FOR BATHING: A LOT
MOVING FROM LYING ON BACK TO SITTING ON SIDE OF FLAT BED: A LOT
WALKING IN HOSPITAL ROOM: TOTAL
MOBILITY SCORE: 10
MOBILITY SCORE: 11
HELP NEEDED FOR BATHING: A LOT
DAILY ACTIVITIY SCORE: 17
CLIMB 3 TO 5 STEPS WITH RAILING: TOTAL
TURNING FROM BACK TO SIDE WHILE IN FLAT BAD: A LITTLE

## 2025-01-30 ASSESSMENT — PAIN DESCRIPTION - PAIN TYPE
TYPE: ACUTE PAIN
TYPE: CHRONIC PAIN;ACUTE PAIN
TYPE: ACUTE PAIN
TYPE: CHRONIC PAIN
TYPE: ACUTE PAIN
TYPE: ACUTE PAIN

## 2025-01-30 ASSESSMENT — FIBROSIS 4 INDEX
FIB4 SCORE: 5.14
FIB4 SCORE: 5.14

## 2025-01-30 NOTE — PROGRESS NOTES
4 Eyes Skin Assessment Completed by July RN and DANYEL Levin.    Head WDL  Ears WDL  Nose WDL  Mouth WDL  Neck WDL  Breast/Chest WDL  Shoulder Blades WDL  Spine WDL  (R) Arm/Elbow/Hand Bruising  (L) Arm/Elbow/Hand Bruising  Abdomen WDL  Groin Redness  Scrotum/Coccyx/Buttocks Blanching    (R) Leg Skin graft, Scar and Scab    (L) Leg AKA amputation     (R) Heel/Foot/Toe WDL  (L) Heel/Foot/Toe AKA amputation           Devices In Places Pulse Ox      Interventions In Place Pillows    Possible Skin Injury No    Pictures Uploaded Into Epic N/A  Wound Consult Placed N/A  RN Wound Prevention Protocol Ordered No

## 2025-01-30 NOTE — ASSESSMENT & PLAN NOTE
Likely contributory from hepatic encephalopathy complicated by cystitis  Continue antibiotic therapy  Continue lactulose, consider rifaximin

## 2025-01-30 NOTE — PROGRESS NOTES
Assumed care of pt. Call light in reach. Bed in lowest position. Fall precautions in place. Fall education provided. Strip alarm in place. Plan of care discussed with pt. Pt agreeing to plan of care. Communication board updated. All questions answered. Assessment completed.

## 2025-01-30 NOTE — HOSPITAL COURSE
Ms. April Alicia is a 64 y.o. female with a past medical history of COPD, alcoholic cirrhosis, atrial fibrillation, type 2 diabetes, left BKA who resides at [facility] and presented 1/29/2025 with altered mental status.      Per report patient has had declining mental status over the last several days thus presented for further evaluation.    On evaluation patient is awake and alert to self and place.  She has slowed mentation but answers appropriately.  She is hard of hearing.  She complains of body aches/joint pains which she says is chronic.  She denies shortness of breath, cough, chest pain, nausea vomiting or diarrhea.  She denies dysuria but does have suprapubic pain on exam.     Patient is afebrile heart rate in the 60s, RR 15, /73 she saturating 99% on room air.Labs remarkable for WBC of 3.8, H&H 10.4/32.3 and platelets 41.  Bicarb 16, BUN 33 ammonia elevated at 120. UA is positive for nitrite leukocyte esterase WBC and many bacteria. Patient admitted for UTI and hepatic encephalopathy.    During this hospitalization, patient is being treated for urinary tract infection.  Also monitoring patient's ammonia level of which has increased since admission.  It went from 120 now to 260.  Will continue lactulose therapy.

## 2025-01-30 NOTE — PROGRESS NOTES
Hospital Medicine Daily Progress Note    Date of Service  1/30/2025    Chief Complaint  April Alicia is a 64 y.o. female admitted 1/29/2025 with altered mental status    Hospital Course  Ms. April Alicia is a 64 y.o. female with a past medical history of COPD, alcoholic cirrhosis, atrial fibrillation, type 2 diabetes, left BKA who resides at [facility] and presented 1/29/2025 with altered mental status.      Per report patient has had declining mental status over the last several days thus presented for further evaluation.    On evaluation patient is awake and alert to self and place.  She has slowed mentation but answers appropriately.  She is hard of hearing.  She complains of body aches/joint pains which she says is chronic.  She denies shortness of breath, cough, chest pain, nausea vomiting or diarrhea.  She denies dysuria but does have suprapubic pain on exam.     Patient is afebrile heart rate in the 60s, RR 15, /73 she saturating 99% on room air.Labs remarkable for WBC of 3.8, H&H 10.4/32.3 and platelets 41.  Bicarb 16, BUN 33 ammonia elevated at 120. UA is positive for nitrite leukocyte esterase WBC and many bacteria. Patient admitted for UTI and hepatic encephalopathy.    During this hospitalization, patient is being treated for urinary tract infection.  Also monitoring patient's ammonia level of which has increased since admission.  It went from 120 now to 260.  Will continue lactulose therapy.    Interval Problem Update  Patient seen and examined.  Patient alert and oriented x 2 only.  Patient unable to give details in regards to her hospitalization.  Discussed with patient continues confusion likely from elevated ammonia level complicated by urinary tract infection.  Will consult palliative team due to notable failure to thrive of patient.  Plan of care: Continue to monitor patient; reorient as indicated; monitor ammonia level; continue antibiotic therapy for noted cystitis;  palliative team consulted for advance care planning.  Disposition: Patient switched to inpatient status as she is anticipated to stay 2-3 midnights for management of hepatic encephalopathy and complicated UTI  Lab work: Reviewed; expected  VSS at this time    I have discussed this patient's plan of care and discharge plan at IDT rounds today with Case Management, Nursing, Nursing leadership, and other members of the IDT team.    Consultants/Specialty  NONE    Code Status  Full Code    Disposition  The patient is not medically cleared for discharge to home or a post-acute facility.  Anticipate discharge to: home with close outpatient follow-up    I have placed the appropriate orders for post-discharge needs.    Review of Systems  Review of Systems   Unable to perform ROS: Mental acuity        Physical Exam  Temp:  [36.3 °C (97.4 °F)-37.1 °C (98.8 °F)] 36.3 °C (97.4 °F)  Pulse:  [69-90] 75  Resp:  [12-20] 20  BP: ()/(54-85) 104/64  SpO2:  [96 %-99 %] 99 %    Physical Exam  Vitals reviewed.   Constitutional:       Appearance: She is toxic-appearing.   HENT:      Head: Normocephalic.      Nose: Nose normal.      Mouth/Throat:      Mouth: Mucous membranes are moist.   Eyes:      Pupils: Pupils are equal, round, and reactive to light.   Cardiovascular:      Rate and Rhythm: Normal rate and regular rhythm.      Pulses: Normal pulses.      Heart sounds: Normal heart sounds.   Pulmonary:      Effort: Pulmonary effort is normal.      Breath sounds: Normal breath sounds.   Abdominal:      General: There is distension.      Palpations: Abdomen is soft.      Tenderness: There is abdominal tenderness. There is guarding.   Musculoskeletal:         General: Tenderness present.      Cervical back: Normal range of motion and neck supple.   Skin:     General: Skin is dry.      Capillary Refill: Capillary refill takes 2 to 3 seconds.   Neurological:      Mental Status: Mental status is at baseline.      Motor: Weakness present.          Fluids    Intake/Output Summary (Last 24 hours) at 1/30/2025 1211  Last data filed at 1/29/2025 2357  Gross per 24 hour   Intake --   Output 400 ml   Net -400 ml        Laboratory  Recent Labs     01/29/25  1045 01/30/25  0326   WBC 3.8* 5.4   RBC 3.58* 3.41*   HEMOGLOBIN 10.4* 9.9*   HEMATOCRIT 32.3* 30.8*   MCV 90.2 90.3   MCH 29.1 29.0   MCHC 32.2 32.1*   RDW 53.3* 54.0*   PLATELETCT 41* 63*   MPV  --  10.2     Recent Labs     01/29/25  1206 01/30/25  0326   SODIUM 137 136   POTASSIUM 5.1 4.9   CHLORIDE 111 109   CO2 16* 16*   GLUCOSE 109* 171*   BUN 33* 36*   CREATININE 0.67 0.85   CALCIUM 8.5 7.9*                   Imaging  No orders to display        Assessment/Plan  * Hepatic encephalopathy (HCC)- (present on admission)  Assessment & Plan  Hx of alcohol cirrhosis   Presents with slowed mentation, not at her baseline, likely due to hepatic encephalopathy   Lactulose started, pt reports taking at SNF  If no improvement consider adding rifaximin   Delirium precautions   Fall precautions     Altered mental status- (present on admission)  Assessment & Plan  Likely contributory from hepatic encephalopathy complicated by cystitis  Continue antibiotic therapy  Continue lactulose, consider rifaximin    UTI (urinary tract infection)- (present on admission)  Assessment & Plan  UA pos for Nitrite, LE, WBC and bacteria   Follow up urine Cx   Given rocephin in the ER, will continue     Narcotic dependence (HCC)- (present on admission)  Assessment & Plan  Pt on chronic oxycodone, is asking for pain meds currently   Given AMS, will start low dose oxy 2.5-5 (reg dose is 10)   Gabapentin resumed at lower dose as well 200 mg BID (previous 400 mg BID), can increase as mentation improves     Metabolic acidosis- (present on admission)  Assessment & Plan  Bicarb 16, renal function stable, BUN is elevated   Continue lasix/aldactone for now  Monitor labs     Type 2 diabetes mellitus with diabetic neuropathy, with long-term  current use of insulin (HCC)- (present on admission)  Assessment & Plan  History of, most recent Ha1c is normal at 5.1%   Her BG checks <150  Will not start ISS at this time, if BG consistently >180 can start     Paroxysmal atrial fibrillation- (present on admission)  Assessment & Plan  Chronic A.fib, rate controlled   Not on AC due to risk of bleeding, low plt   Stable     Thrombocytopenia (HCC)- (present on admission)  Assessment & Plan  Chronic, stable, from cirrhosis   No signs of bleeding   Monitor          VTE prophylaxis:    heparin ppx      I have performed a physical exam and reviewed and updated ROS and Plan today (1/30/2025). In review of yesterday's note (1/29/2025), there are no changes except as documented above.    IFlor DNP performed a substantiated portion of the service face-to-face with same patient on the same date of service INDEPENDENTLY FROM THE MD ON ASSESSMENT, EXAMINATION, DISCUSSION PLAN OF CARE FOR 24 MINUTES.  I was personally involved in reviewing and conducting the medical decision making, including the information as described above.

## 2025-01-30 NOTE — PROGRESS NOTES
4 Eyes Skin Assessment Completed by DANYEL Polo and DANYEL Veronica.    Head WDL  Ears WDL  Nose WDL  Mouth WDL  Neck WDL  Breast/Chest WDL  Shoulder Blades WDL  Spine WDL  (R) Arm/Elbow/Hand WDL  (L) Arm/Elbow/Hand WDL  Abdomen WDL  Groin Redness and Blanching  Scrotum/Coccyx/Buttocks Redness and Blanching  (R) Leg Scab, old skin graft  (L) Leg Old amputation  (R) Heel/Foot/Toe WDL  (L) Heel/Foot/Toe N/A          Devices In Places None      Interventions In Place Pillows and Q2 Turns    Possible Skin Injury Yes    Pictures Uploaded Into Epic Yes  Wound Consult Placed N/A  RN Wound Prevention Protocol Ordered Yes

## 2025-01-30 NOTE — DISCHARGE PLANNING
Care Transition Team Assessment      Discussed in AM rounds, Patient is long term at [facility] and plan is to return when medically cleared.          Information Source  Orientation Level: Oriented to place, Oriented to person    Interdisciplinary Discharge Planning  Primary Care Physician: SNF MD  Lives with - Patient's Self Care Capacity: Other (Comments) (Lives at [facility])  Patient or legal guardian wants to designate a caregiver: No  Support Systems: Family Member(s)  Housing / Facility: Skilled Nursing Facility  Name of Care Facility: [facility]  Able to Return to Previous ADL's: Future Time w/Therapy  Mobility Issues: Yes  Prior Services: Other (Comments) (SNF)  Assistance Needed: Yes    Discharge Preparedness  What are your discharge supports?: Child (SNF staff)    Finances  Prescription Coverage: Yes    Discharge Risks or Barriers  Patient risk factors: Complex medical needs, Vulnerable adult    Anticipated Discharge Information  Discharge Disposition: D/T to SNF with Medicare cert in anticipation of skilled care (03)  Discharge Address: [address] [place] [address]  Discharge Contact Phone Number: [phone]

## 2025-01-30 NOTE — PROGRESS NOTES
I called and gave report to DANYEL Polo.  All questions and concerns have been addressed.  Pt's chart, belongings, and any medications will be gathered for transfer to her new room on S601/1.

## 2025-01-30 NOTE — RESPIRATORY CARE
COPD EDUCATION by COPD CLINICAL EDUCATOR  1/30/2025 at 11:56 AM by Shannon Varela RRT     Patient reviewed by COPD education team. Patient does not qualify for the COPD program because patient is oriented to self.

## 2025-01-30 NOTE — DISCHARGE PLANNING
Discussed in AM rounds, Patient is from [facility] and is long term there. Message sent to Gregorio LIZAMA with request to sent referral to [facility]. Anticipate return to [facility] when medically cleared.

## 2025-01-30 NOTE — PROGRESS NOTES
Fredy from Lab called with critical result of ammonia of 260 at 0926. Critical lab result read back to Fredy.   Dr.Nica Garg notified of critical lab result at 0938. Critical lab result read back by Dr. Nery Garg.

## 2025-01-30 NOTE — CARE PLAN
The patient is Watcher - Medium risk of patient condition declining or worsening    Shift Goals  Clinical Goals: lactulose, IV ABX, safety, pain management  Patient Goals: feel better  Family Goals: EDMUND    Progress made toward(s) clinical / shift goals:    Problem: Knowledge Deficit - Standard  Goal: Patient and family/care givers will demonstrate understanding of plan of care, disease process/condition, diagnostic tests and medications  Description: Target End Date:  1/30/25    1.  Patient and family/caregiver oriented to unit, equipment, visitation policy and means for communicating concern  2.  Complete/review Learning Assessment  3.  Assess knowledge level of disease process/condition, treatment plan, diagnostic tests and medications  4.  Explain disease process/condition, treatment plan, diagnostic tests and medications  Outcome: Progressing     Problem: Pain - Standard  Goal: Alleviation of pain or a reduction in pain to the patient’s comfort goal  Description: Target End Date:  1/30/25    1.  Document pain using the appropriate pain scale per order or unit policy  2.  Educate and implement non-pharmacologic comfort measures (i.e. relaxation, distraction, massage, cold/heat therapy, etc.)  3.  Pain management medications as ordered  4.  Reassess pain after pain med administration per policy  5.  If opiods administered assess patient's response to pain medication is appropriate per POSS sedation scale  6.  Follow pain management plan developed in collaboration with patient and interdisciplinary team (including palliative care or pain specialists if applicable)  Outcome: Progressing     Problem: Skin Integrity  Goal: Skin integrity is maintained or improved  Description: Target End Date:  1/30/25    1.  Assess and monitor skin integrity, appearance and/or temperature  2.  Assess risk factors for impaired skin integrity and/or pressures ulcers  3.  Implement precautions to protect skin integrity in collaboration  with interdisciplinary team  4.  Implement pressure ulcer prevention protocol if at risk for skin breakdown  5.  Confirm wound care consult if at risk for skin breakdown  6.  Ensure patient use of pressure relieving devices  (Low air loss bed, waffle overlay, heel protectors, ROHO cushion, etc)  Outcome: Progressing     Problem: Fall Risk  Goal: Patient will remain free from falls  Description: Target End Date: 1/30/25    1.  Assess for fall risk factors  2.  Implement fall precautions  Outcome: Progressing

## 2025-01-31 VITALS
DIASTOLIC BLOOD PRESSURE: 68 MMHG | BODY MASS INDEX: 24.93 KG/M2 | TEMPERATURE: 97.3 F | WEIGHT: 164.46 LBS | HEART RATE: 54 BPM | OXYGEN SATURATION: 98 % | RESPIRATION RATE: 17 BRPM | HEIGHT: 68 IN | SYSTOLIC BLOOD PRESSURE: 104 MMHG

## 2025-01-31 PROBLEM — K76.82 HEPATIC ENCEPHALOPATHY (HCC): Status: RESOLVED | Noted: 2025-01-29 | Resolved: 2025-01-31

## 2025-01-31 PROBLEM — R41.82 ALTERED MENTAL STATUS: Status: RESOLVED | Noted: 2025-01-30 | Resolved: 2025-01-31

## 2025-01-31 LAB
ANION GAP SERPL CALC-SCNC: 10 MMOL/L (ref 7–16)
BACTERIA UR CULT: ABNORMAL
BACTERIA UR CULT: ABNORMAL
BUN SERPL-MCNC: 39 MG/DL (ref 8–22)
CALCIUM SERPL-MCNC: 7.9 MG/DL (ref 8.5–10.5)
CHLORIDE SERPL-SCNC: 106 MMOL/L (ref 96–112)
CO2 SERPL-SCNC: 17 MMOL/L (ref 20–33)
CREAT SERPL-MCNC: 0.92 MG/DL (ref 0.5–1.4)
ERYTHROCYTE [DISTWIDTH] IN BLOOD BY AUTOMATED COUNT: 53.6 FL (ref 35.9–50)
GFR SERPLBLD CREATININE-BSD FMLA CKD-EPI: 69 ML/MIN/1.73 M 2
GLUCOSE SERPL-MCNC: 139 MG/DL (ref 65–99)
HCT VFR BLD AUTO: 33.1 % (ref 37–47)
HGB BLD-MCNC: 10.6 G/DL (ref 12–16)
MCH RBC QN AUTO: 29 PG (ref 27–33)
MCHC RBC AUTO-ENTMCNC: 32 G/DL (ref 32.2–35.5)
MCV RBC AUTO: 90.7 FL (ref 81.4–97.8)
PLATELET # BLD AUTO: 60 K/UL (ref 164–446)
PLATELETS.RETICULATED NFR BLD AUTO: 2.1 % (ref 0.6–13.1)
PMV BLD AUTO: 10.4 FL (ref 9–12.9)
POTASSIUM SERPL-SCNC: 5.4 MMOL/L (ref 3.6–5.5)
RBC # BLD AUTO: 3.65 M/UL (ref 4.2–5.4)
SIGNIFICANT IND 70042: ABNORMAL
SITE SITE: ABNORMAL
SODIUM SERPL-SCNC: 133 MMOL/L (ref 135–145)
SOURCE SOURCE: ABNORMAL
WBC # BLD AUTO: 4.6 K/UL (ref 4.8–10.8)

## 2025-01-31 PROCEDURE — 85055 RETICULATED PLATELET ASSAY: CPT

## 2025-01-31 PROCEDURE — 700102 HCHG RX REV CODE 250 W/ 637 OVERRIDE(OP): Performed by: INTERNAL MEDICINE

## 2025-01-31 PROCEDURE — A9270 NON-COVERED ITEM OR SERVICE: HCPCS | Performed by: HOSPITALIST

## 2025-01-31 PROCEDURE — A9270 NON-COVERED ITEM OR SERVICE: HCPCS | Performed by: INTERNAL MEDICINE

## 2025-01-31 PROCEDURE — 85027 COMPLETE CBC AUTOMATED: CPT

## 2025-01-31 PROCEDURE — 700105 HCHG RX REV CODE 258: Performed by: STUDENT IN AN ORGANIZED HEALTH CARE EDUCATION/TRAINING PROGRAM

## 2025-01-31 PROCEDURE — 36415 COLL VENOUS BLD VENIPUNCTURE: CPT

## 2025-01-31 PROCEDURE — A9270 NON-COVERED ITEM OR SERVICE: HCPCS | Performed by: STUDENT IN AN ORGANIZED HEALTH CARE EDUCATION/TRAINING PROGRAM

## 2025-01-31 PROCEDURE — 80048 BASIC METABOLIC PNL TOTAL CA: CPT

## 2025-01-31 PROCEDURE — 700111 HCHG RX REV CODE 636 W/ 250 OVERRIDE (IP): Performed by: STUDENT IN AN ORGANIZED HEALTH CARE EDUCATION/TRAINING PROGRAM

## 2025-01-31 PROCEDURE — 8E0ZXY6 ISOLATION: ICD-10-PCS

## 2025-01-31 PROCEDURE — 700102 HCHG RX REV CODE 250 W/ 637 OVERRIDE(OP): Performed by: HOSPITALIST

## 2025-01-31 PROCEDURE — 99239 HOSP IP/OBS DSCHRG MGMT >30: CPT | Performed by: HOSPITALIST

## 2025-01-31 PROCEDURE — 700102 HCHG RX REV CODE 250 W/ 637 OVERRIDE(OP): Performed by: STUDENT IN AN ORGANIZED HEALTH CARE EDUCATION/TRAINING PROGRAM

## 2025-01-31 RX ORDER — NYSTATIN 100000 [USP'U]/G
POWDER TOPICAL 2 TIMES DAILY
Status: DISCONTINUED | OUTPATIENT
Start: 2025-01-31 | End: 2025-01-31 | Stop reason: HOSPADM

## 2025-01-31 RX ORDER — NYSTATIN 100000 [USP'U]/G
1 POWDER TOPICAL 2 TIMES DAILY
Status: SHIPPED
Start: 2025-01-31

## 2025-01-31 RX ORDER — GABAPENTIN 100 MG/1
200 CAPSULE ORAL 2 TIMES DAILY
Status: SHIPPED
Start: 2025-01-31

## 2025-01-31 RX ORDER — OXYCODONE HYDROCHLORIDE 5 MG/1
2.5 TABLET ORAL 2 TIMES DAILY PRN
Status: DISCONTINUED | OUTPATIENT
Start: 2025-01-31 | End: 2025-01-31 | Stop reason: HOSPADM

## 2025-01-31 RX ORDER — LACTULOSE 10 G/15ML
30 SOLUTION ORAL 4 TIMES DAILY
Status: SHIPPED
Start: 2025-01-31

## 2025-01-31 RX ADMIN — HEPARIN SODIUM 5000 UNITS: 5000 INJECTION, SOLUTION INTRAVENOUS; SUBCUTANEOUS at 14:11

## 2025-01-31 RX ADMIN — GABAPENTIN 200 MG: 100 CAPSULE ORAL at 04:19

## 2025-01-31 RX ADMIN — SPIRONOLACTONE 50 MG: 50 TABLET ORAL at 04:20

## 2025-01-31 RX ADMIN — CEFTRIAXONE SODIUM 1000 MG: 10 INJECTION, POWDER, FOR SOLUTION INTRAVENOUS at 04:21

## 2025-01-31 RX ADMIN — OXYCODONE HYDROCHLORIDE AND ACETAMINOPHEN 500 MG: 500 TABLET ORAL at 04:20

## 2025-01-31 RX ADMIN — OXYCODONE 2.5 MG: 5 TABLET ORAL at 09:16

## 2025-01-31 RX ADMIN — ACETAMINOPHEN 650 MG: 325 TABLET ORAL at 05:07

## 2025-01-31 RX ADMIN — LACTULOSE 30 ML: 10 SOLUTION ORAL at 14:11

## 2025-01-31 RX ADMIN — RIFAXIMIN 550 MG: 550 TABLET ORAL at 04:39

## 2025-01-31 RX ADMIN — OMEPRAZOLE 20 MG: 20 CAPSULE, DELAYED RELEASE ORAL at 04:19

## 2025-01-31 RX ADMIN — LACTULOSE 30 ML: 10 SOLUTION ORAL at 09:16

## 2025-01-31 RX ADMIN — OXYCODONE 5 MG: 5 TABLET ORAL at 04:20

## 2025-01-31 RX ADMIN — HEPARIN SODIUM 5000 UNITS: 5000 INJECTION, SOLUTION INTRAVENOUS; SUBCUTANEOUS at 04:20

## 2025-01-31 RX ADMIN — Medication 400 MG: at 04:20

## 2025-01-31 RX ADMIN — FUROSEMIDE 40 MG: 40 TABLET ORAL at 04:19

## 2025-01-31 RX ADMIN — OXYCODONE 2.5 MG: 5 TABLET ORAL at 14:11

## 2025-01-31 ASSESSMENT — PAIN DESCRIPTION - PAIN TYPE
TYPE: CHRONIC PAIN
TYPE: ACUTE PAIN

## 2025-01-31 NOTE — CARE PLAN
The patient is Stable - Low risk of patient condition declining or worsening    Shift Goals  Clinical Goals: maintain skin integrity  Patient Goals: feel better  Family Goals: pt to get better    Progress made toward(s) clinical / shift goals:    Problem: Fall Risk  Goal: Patient will remain free from falls  Outcome: Progressing     Problem: Pain - Standard  Goal: Alleviation of pain or a reduction in pain to the patient’s comfort goal  Outcome: Progressing   Medicated with PRN meds for pain, see MAR.     Problem: Fall Risk  Goal: Patient will remain free from falls  Outcome: Progressing   Bed alarm in place. Bed in the lowest position. Call light within reach.     Patient is not progressing towards the following goals:

## 2025-01-31 NOTE — DISCHARGE PLANNING
Case Management Discharge Planning    Admission Date: 1/29/2025  GMLOS: 3.2  ALOS: 1    6-Clicks ADL Score: 17  6-Clicks Mobility Score: 11  PT and/or OT Eval ordered: Yes  Post-acute Referrals Ordered: Yes  Post-acute Choice Obtained: Yes  Has referral(s) been sent to post-acute provider:  Yes      Anticipated Discharge Dispo: Discharge Disposition: D/T to SNF with Medicare cert in anticipation of skilled care (03)  Discharge Address: [address] [place] [address]  Discharge Contact Phone Number: [phone]    DME Needed: No    Action(s) Taken: JAZMINE received notification from the MD that the patient is medically cleared to DC back to [facility] for LTC. NIKISW left a VM for Rory at [facility] requesting a call back.     Addendum @ 113: NIKISW received a phone call from Rory at [facility]. [facility] reported that the patient can return today at 1330. NIKISW notified IDT via voalte.     Addendum @ 1212: NIKISW completed COBRA and provided packet to RN.     Addendum @ 1241: NIKISW was updated by Vi from [employer] that the patient does not have transport benefits under her insurance. LMSW escalated cost of $277 for transport with [facility] to leadership. Leadership approved transport. ASF faxed to [employer].     Escalations Completed: None    Medically Clear: Yes    Next Steps: Patient to discharge to [facility] at 1330.    Barriers to Discharge: None    Is the patient up for discharge tomorrow: No

## 2025-01-31 NOTE — DISCHARGE SUMMARY
Discharge Summary    CHIEF COMPLAINT ON ADMISSION  Chief Complaint   Patient presents with    ALOC     Pt bib EMS from [facility] where staff reports a decline in mentation status from AO4 to AO2 over the last few days        Reason for Admission  EMS    Admission Date  1/29/2025     CODE STATUS  Full Code    HPI & HOSPITAL COURSE  Patient is a 64 year old female with history of COPD, alcoholic cirrhosis, atrial fibrillation, type 2 diabetes and left BKA. She resides at [facility] and presented to [facility] on 1/29/2025 with altered mental status.       On admission, patient was reportedly alert to self and place.  She had slowed mentation but answered appropriately and is hard of hearing.  She complained of body aches/joint pains which she said are chronic.      She was admitted for treatment of a UTI and hepatic encephalopathy. She had been recently admitted with similar symptoms and had been on xifaxin at that time in addition to lactulose. According to her medical record review, it appears to me that the xifaxin had been stopped. Cost of this medication can be prohibitive so if [facility] is unable to give it, she will require higher and titrated doses of lactulose to compensate.Her lactulose here was increased from tid to qid. She is now back at her baseline mentation. There was mention of concern of increasing ammonia levels from the previous provider, however literature and studies have suggested that treatment based on ammonia levels is not recommended as there is not always an accurate correlation. At time of discharge, she is back to her baseline. Axox4. She has chronic metabolic acidosis, with loose stool from the lactulose contributing. She also has chronic thrombocytopenia (and pancytopenia) related to the cirrhosis that is currently near her baseline. I recommend intermittent monitoring of these at [facility].    Patient is at very high risk of recurrent hepatic encephalopathy as her end stage liver disease is a  progressive condition. The acute UTI and medication change above likely contributed. Another contributing factor is also likely to be her chronic opiate use, she appears to have decreasing tolerance due to worsening hepatic function. Here her chronic opiate dose was decreased by 75%. I highly recommend that her chronic providers at [facility] continue change her to a lower dose and taper it as much as possible and is tolerated.     Patient's long term prognosis is poor. DNR/ DNI and hospice were recommended and patient stated she would consider these.     Therefore, she is discharged in fair and stable condition to skilled nursing facility.    The patient met 2-midnight criteria for an inpatient stay at the time of discharge.      FOLLOW UP ITEMS POST DISCHARGE  snf    DISCHARGE DIAGNOSES  Principal Problem (Resolved):    Hepatic encephalopathy (HCC) (POA: Yes)  Active Problems:    Thrombocytopenia (HCC) (POA: Yes)    Paroxysmal atrial fibrillation (POA: Yes)    Type 2 diabetes mellitus with diabetic neuropathy, with long-term current use of insulin (HCC) (POA: Yes)    Metabolic acidosis (POA: Yes)    Narcotic dependence (HCC) (POA: Yes)    UTI (urinary tract infection) (POA: Yes)  Resolved Problems:    Altered mental status (POA: Yes)      FOLLOW UP  No future appointments.  [facility]  [address] [facility] [address]  [phone]          MEDICATIONS ON DISCHARGE     Medication List        START taking these medications        Instructions   cefTRIAXone 1 g/10 mL  Start taking on: February 1, 2025  Commonly known as: Rocephin   Infuse 10 mL into a venous catheter every 24 hours for 1 day.  Dose: 1,000 mg     nystatin powder  Commonly known as: Mycostatin   Apply 1 g topically 2 times a day.  Dose: 1 Application     riFAXIMin 550 MG Tabs tablet  Commonly known as: Xifaxan   Take 1 Tablet by mouth 2 times a day.  Dose: 550 mg            CHANGE how you take these medications        Instructions    gabapentin 100 MG Caps  What changed:   medication strength  how much to take  when to take this  Commonly known as: Neurontin   Take 2 Capsules by mouth 2 times a day.  Dose: 200 mg     lactulose 20 GM/30ML Soln  What changed:   when to take this  additional instructions   Take 30 mL by mouth 4 times a day.  Dose: 30 mL            CONTINUE taking these medications        Instructions   albuterol 108 (90 Base) MCG/ACT Aers inhalation aerosol   Inhale 2 Puffs every four hours as needed for Shortness of Breath.  Dose: 2 Puff     ascorbic acid 500 MG Tabs  Commonly known as: Ascorbic Acid   Take 500 mg by mouth every morning.  Dose: 500 mg     atorvastatin 20 MG Tabs  Commonly known as: Lipitor   Take 1 Tablet by mouth every evening.  Dose: 20 mg     carvedilol 6.25 MG Tabs  Commonly known as: Coreg   Take 6.25 mg by mouth 2 times a day with meals.  Dose: 6.25 mg     ferrous sulfate 325 (65 Fe) MG tablet   Take 325 mg by mouth at bedtime.  Dose: 325 mg     furosemide 40 MG Tabs  Commonly known as: Lasix   Take 1 Tablet by mouth every day.  Dose: 40 mg     glucose 77.4 % Gel   Take 1 Tube by mouth see administration instructions. For BS < 60  Dose: 1 Tube     HumuLIN N 100 UNIT/ML Susp  Generic drug: insulin NPH   Inject 1-8 Units under the skin 4 Times a Day,Before Meals and at Bedtime. If -200=1 unit  201-250=3 units  251-300=5 units  301-350=7 units  351-400=8 units  Dose: 1-8 Units     ipratropium-albuterol 0.5-2.5 (3) MG/3ML nebulizer solution  Commonly known as: Duoneb   Take 3 mL by nebulization every four hours as needed for Shortness of Breath.  Dose: 3 mL     magnesium oxide 400 MG Tabs tablet  Commonly known as: Mag-Ox   Take 400 mg by mouth every morning.  Dose: 400 mg     metoprolol tartrate 25 MG Tabs  Commonly known as: Lopressor   Take 12.5 mg by mouth every 8 hours as needed. If HR >120 as long as SBP>100  Dose: 12.5 mg     pantoprazole 20 MG tablet  Commonly known as: Protonix   Take 20 mg by  mouth every morning.  Dose: 20 mg     spironolactone 50 MG Tabs  Commonly known as: Aldactone   Take 50 mg by mouth every day.  Dose: 50 mg            STOP taking these medications      acetaminophen 325 MG Tabs  Commonly known as: Tylenol     oxyCODONE immediate release 10 MG immediate release tablet  Commonly known as: Roxicodone              Allergies  Allergies   Allergen Reactions    Meropenem Swelling     Pt had rxt to meropenem June 2024 (marked tongue swelling and right facial/periorbital edema)    Tuberculin Tests Unspecified     Per MAR from [facility]       DIET  Orders Placed This Encounter   Procedures    Diet Order Diet: 2 Gram Sodium     Standing Status:   Standing     Number of Occurrences:   1     Order Specific Question:   Diet:     Answer:   2 Gram Sodium [7]       ACTIVITY  As tolerated and directed by skilled nursing.  Weight bearing as tolerated    LINES, DRAINS, AND WOUNDS  This is an automated list. Peripheral IVs will be removed prior to discharge.  Peripheral IV 01/29/25 20 G Anterior;Left Forearm (Active)   Site Assessment Clean;Dry;Intact 01/30/25 2000   Dressing Type Transparent 01/30/25 2000   Line Status Saline locked 01/30/25 2000   Dressing Status Clean;Dry;Intact 01/30/25 2000   Dressing Intervention N/A 01/30/25 2000   Dressing Change Due 02/01/25 01/29/25 2200   Infiltration Grading ([facility], [facility]) 0 01/30/25 2000   Phlebitis Scale ([facility] Only) 0 01/30/25 2000       Wound 11/25/24 Pannus Anterior (Active)       Wound 01/15/25 Other (comment) Sacrum (Active)   Wound Image   01/30/25 1500   Site Assessment Red;Excoriated;Fragile 01/30/25 2120   Periwound Assessment Red;Blanchable erythema 01/30/25 2120   Closure Open to air 01/30/25 2120   Drainage Amount None 01/30/25 2120   Drainage Description Sanguineous 01/16/25 2119   Treatments Cleansed;Site care 01/30/25 2120   Wound Cleansing Foam Cleanser/Washcloth 01/30/25 2120   Periwound Protectant Barrier Paste 01/30/25 2120   Dressing  Status Open to Air 01/30/25 2120       Peripheral IV 01/29/25 20 G Anterior;Left Forearm (Active)   Site Assessment Clean;Dry;Intact 01/30/25 2000   Dressing Type Transparent 01/30/25 2000   Line Status Saline locked 01/30/25 2000   Dressing Status Clean;Dry;Intact 01/30/25 2000   Dressing Intervention N/A 01/30/25 2000   Dressing Change Due 02/01/25 01/29/25 2200   Infiltration Grading ([facility], [facility]) 0 01/30/25 2000   Phlebitis Scale ([facility] Only) 0 01/30/25 2000               MENTAL STATUS ON TRANSFER  axox4     PROCEDURES  No orders to display         LABORATORY  Lab Results   Component Value Date    SODIUM 133 (L) 01/31/2025    POTASSIUM 5.4 01/31/2025    CHLORIDE 106 01/31/2025    CO2 17 (L) 01/31/2025    GLUCOSE 139 (H) 01/31/2025    BUN 39 (H) 01/31/2025    CREATININE 0.92 01/31/2025    CREATININE 0.9 03/12/2009        Lab Results   Component Value Date    WBC 4.6 (L) 01/31/2025    HEMOGLOBIN 10.6 (L) 01/31/2025    HEMATOCRIT 33.1 (L) 01/31/2025    PLATELETCT 60 (L) 01/31/2025        Total time of the discharge process exceeds 45 minutes.

## 2025-01-31 NOTE — PROGRESS NOTES
4 Eyes Skin Assessment Completed by DANYEL Jackson and DANYEL Stevenson.    Head WDL  Ears WDL  Nose WDL  Mouth WDL  Neck WDL  Breast/Chest WDL  Shoulder Blades WDL  Spine WDL  (R) Arm/Elbow/Hand Redness and Blanching, bruising  (L) Arm/Elbow/Hand Redness and Blanching  Abdomen Redness to the panus  Groin Redness, Blanching, and Swelling  Scrotum/Coccyx/Buttocks Redness and Blanching, discoloration,   (R) Leg old skin graft scar  (L) Leg old AKA amputation. Stump healed  (R) Heel/Foot/Toe WDL, dry  (L) Heel/Foot/Toe N/A                 Devices In Places PIV      Interventions In Place Pillows, Q2 Turns, and Barrier Cream    Possible Skin Injury Yes    Pictures Uploaded Into Epic Yes  Wound Consult Placed N/A  RN Wound Prevention Protocol Ordered Yes

## 2025-01-31 NOTE — PROGRESS NOTES
ISOLATION PRECAUTIONS EDUCATION    Educated PATIENT, FAMILY, S.O: patient on isolation for ESBL.    Educated on reason for isolation, how the infection may be transmitted, and how to help prevent transmission to others. Educated precautions involves staff and visitors wearing PPE, following Standard Precautions and performing meticulous hand hygiene in order to prevent transmission of infection.     Contact Precautions: Educated that Contact Precautions involves staff and visitors wearing gowns and gloves when in the patient room.     In addition, educated that the patient may leave the room, but prior to exiting the patient room each time, the patient needs to have on a fresh patient gown, ensure the potentially infectious area is covered, and perform hand hygiene with soap and water or alcohol-based hand rub, immediately prior to exiting the room.     Patient transport and mobilization on unit  Educated that they may leave their room, but prior to exiting, the patient needs to have on a fresh patient gown, ensure the potentially infectious area is covered, performing appropriate hand hygiene immediately prior to exiting the room.

## 2025-01-31 NOTE — CARE PLAN
The patient is Stable - Low risk of patient condition declining or worsening    Shift Goals  Clinical Goals: mentation, Pain control,safety  Patient Goals: feel better  Family Goals: EDMUND    Progress made toward(s) clinical / shift goals:    Problem: Knowledge Deficit - Standard  Goal: Patient and family/care givers will demonstrate understanding of plan of care, disease process/condition, diagnostic tests and medications  Outcome: Progressing     Problem: Pain - Standard  Goal: Alleviation of pain or a reduction in pain to the patient’s comfort goal  Outcome: Progressing     Problem: Skin Integrity  Goal: Skin integrity is maintained or improved  Outcome: Progressing       Patient is not progressing towards the following goals:

## 2025-01-31 NOTE — PROGRESS NOTES
Pt discharged to [facility] with [facility] transport via [facility]. All pt belongings with pt. No additional needs at this time.

## 2025-01-31 NOTE — PROGRESS NOTES
Called to give RN report to [facility] staff, admissions will have DANYEL Sheridan return call for report.    1315: Return call from DANYEL Sheridan at [facility], report given.    1335: Attempted to call [facility] for new update, pending return call from DANYEL Sheridan.    1350: Updated Fatimah at [facility], pt on contact for ESBL in urine. No changes to d/c.

## 2025-01-31 NOTE — DISCHARGE PLANNING
DC Transport Scheduled    Transport Company Scheduled:  [facility]    Scheduled Date: 1/31/2025  Scheduled Time: 1430    Transport Type: [facility]  Destination: [facility]   Destination address: [address] [place] [id number]    Notified care team of scheduled transport via Voalte.     If there are any changes needed to the DC transportation scheduled, please contact [facility] at ext. [phone] between the hours of 9844-8125. If outside those hours, contact the ED Case Manager at ext. [phone].

## 2025-04-23 ENCOUNTER — HOSPITAL ENCOUNTER (OUTPATIENT)
Facility: MEDICAL CENTER | Age: 65
End: 2025-04-23
Attending: FAMILY MEDICINE
Payer: MEDICARE

## 2025-04-23 DIAGNOSIS — N17.9 ACUTE KIDNEY INJURY (HCC): ICD-10-CM

## 2025-04-23 DIAGNOSIS — K70.31 ALCOHOLIC CIRRHOSIS OF LIVER WITH ASCITES (HCC): ICD-10-CM

## 2025-04-23 LAB
ACANTHOCYTES BLD QL SMEAR: NORMAL
ANISOCYTOSIS BLD QL SMEAR: ABNORMAL
BASOPHILS # BLD AUTO: 1.1 % (ref 0–1.8)
BASOPHILS # BLD: 0.05 K/UL (ref 0–0.12)
BURR CELLS BLD QL SMEAR: NORMAL
COMMENT 1642: NORMAL
EOSINOPHIL # BLD AUTO: 0.32 K/UL (ref 0–0.51)
EOSINOPHIL NFR BLD: 7.2 % (ref 0–6.9)
ERYTHROCYTE [DISTWIDTH] IN BLOOD BY AUTOMATED COUNT: 58.9 FL (ref 35.9–50)
HCT VFR BLD AUTO: 24.6 % (ref 37–47)
HGB BLD-MCNC: 7.3 G/DL (ref 12–16)
IMM GRANULOCYTES # BLD AUTO: 0.03 K/UL (ref 0–0.11)
IMM GRANULOCYTES NFR BLD AUTO: 0.7 % (ref 0–0.9)
LYMPHOCYTES # BLD AUTO: 1.12 K/UL (ref 1–4.8)
LYMPHOCYTES NFR BLD: 25.1 % (ref 22–41)
MACROCYTES BLD QL SMEAR: ABNORMAL
MCH RBC QN AUTO: 28.5 PG (ref 27–33)
MCHC RBC AUTO-ENTMCNC: 29.7 G/DL (ref 32.2–35.5)
MCV RBC AUTO: 96.1 FL (ref 81.4–97.8)
MONOCYTES # BLD AUTO: 0.47 K/UL (ref 0–0.85)
MONOCYTES NFR BLD AUTO: 10.5 % (ref 0–13.4)
MORPHOLOGY BLD-IMP: NORMAL
NEUTROPHILS # BLD AUTO: 2.47 K/UL (ref 1.82–7.42)
NEUTROPHILS NFR BLD: 55.4 % (ref 44–72)
NRBC # BLD AUTO: 0 K/UL
NRBC BLD-RTO: 0 /100 WBC (ref 0–0.2)
OVALOCYTES BLD QL SMEAR: NORMAL
PLATELET # BLD AUTO: 72 K/UL (ref 164–446)
PLATELET BLD QL SMEAR: NORMAL
PLATELETS.RETICULATED NFR BLD AUTO: 1.4 % (ref 0.6–13.1)
PMV BLD AUTO: 11.6 FL (ref 9–12.9)
POIKILOCYTOSIS BLD QL SMEAR: NORMAL
RBC # BLD AUTO: 2.56 M/UL (ref 4.2–5.4)
RBC BLD AUTO: PRESENT
WBC # BLD AUTO: 4.5 K/UL (ref 4.8–10.8)

## 2025-04-23 PROCEDURE — 80076 HEPATIC FUNCTION PANEL: CPT

## 2025-04-23 PROCEDURE — 85025 COMPLETE CBC W/AUTO DIFF WBC: CPT

## 2025-04-23 PROCEDURE — 85055 RETICULATED PLATELET ASSAY: CPT

## 2025-04-24 LAB
ALBUMIN SERPL BCP-MCNC: 2.7 G/DL (ref 3.2–4.9)
ALP SERPL-CCNC: 169 U/L (ref 30–99)
ALT SERPL-CCNC: 12 U/L (ref 2–50)
AST SERPL-CCNC: 16 U/L (ref 12–45)
BILIRUB CONJ SERPL-MCNC: 0.4 MG/DL (ref 0.1–0.5)
BILIRUB INDIRECT SERPL-MCNC: 0.2 MG/DL (ref 0–1)
BILIRUB SERPL-MCNC: 0.6 MG/DL (ref 0.1–1.5)
PROT SERPL-MCNC: 6.4 G/DL (ref 6–8.2)

## 2025-04-28 ENCOUNTER — HOSPITAL ENCOUNTER (OUTPATIENT)
Facility: MEDICAL CENTER | Age: 65
End: 2025-04-28
Attending: FAMILY MEDICINE
Payer: MEDICARE

## 2025-04-28 LAB
ALBUMIN SERPL BCP-MCNC: 2.7 G/DL (ref 3.2–4.9)
ALBUMIN/GLOB SERPL: 0.7 G/DL
ALP SERPL-CCNC: 163 U/L (ref 30–99)
ALT SERPL-CCNC: 13 U/L (ref 2–50)
ANION GAP SERPL CALC-SCNC: 8 MMOL/L (ref 7–16)
AST SERPL-CCNC: 21 U/L (ref 12–45)
BASOPHILS # BLD AUTO: 1.7 % (ref 0–1.8)
BASOPHILS # BLD: 0.07 K/UL (ref 0–0.12)
BILIRUB SERPL-MCNC: 0.8 MG/DL (ref 0.1–1.5)
BUN SERPL-MCNC: 29 MG/DL (ref 8–22)
CALCIUM ALBUM COR SERPL-MCNC: 9 MG/DL (ref 8.5–10.5)
CALCIUM SERPL-MCNC: 8 MG/DL (ref 8.5–10.5)
CHLORIDE SERPL-SCNC: 120 MMOL/L (ref 96–112)
CO2 SERPL-SCNC: 14 MMOL/L (ref 20–33)
CREAT SERPL-MCNC: 1.02 MG/DL (ref 0.5–1.4)
EOSINOPHIL # BLD AUTO: 0.3 K/UL (ref 0–0.51)
EOSINOPHIL NFR BLD: 7.3 % (ref 0–6.9)
ERYTHROCYTE [DISTWIDTH] IN BLOOD BY AUTOMATED COUNT: 59.6 FL (ref 35.9–50)
GFR SERPLBLD CREATININE-BSD FMLA CKD-EPI: 61 ML/MIN/1.73 M 2
GLOBULIN SER CALC-MCNC: 3.9 G/DL (ref 1.9–3.5)
GLUCOSE SERPL-MCNC: 102 MG/DL (ref 65–99)
HCT VFR BLD AUTO: 27.4 % (ref 37–47)
HGB BLD-MCNC: 8.3 G/DL (ref 12–16)
IMM GRANULOCYTES # BLD AUTO: 0.01 K/UL (ref 0–0.11)
IMM GRANULOCYTES NFR BLD AUTO: 0.2 % (ref 0–0.9)
LYMPHOCYTES # BLD AUTO: 0.98 K/UL (ref 1–4.8)
LYMPHOCYTES NFR BLD: 24 % (ref 22–41)
MCH RBC QN AUTO: 28.9 PG (ref 27–33)
MCHC RBC AUTO-ENTMCNC: 30.3 G/DL (ref 32.2–35.5)
MCV RBC AUTO: 95.5 FL (ref 81.4–97.8)
MONOCYTES # BLD AUTO: 0.47 K/UL (ref 0–0.85)
MONOCYTES NFR BLD AUTO: 11.5 % (ref 0–13.4)
NEUTROPHILS # BLD AUTO: 2.26 K/UL (ref 1.82–7.42)
NEUTROPHILS NFR BLD: 55.3 % (ref 44–72)
NRBC # BLD AUTO: 0 K/UL
NRBC BLD-RTO: 0 /100 WBC (ref 0–0.2)
PLATELET # BLD AUTO: 75 K/UL (ref 164–446)
PLATELETS.RETICULATED NFR BLD AUTO: 1 % (ref 0.6–13.1)
PMV BLD AUTO: 12.5 FL (ref 9–12.9)
POTASSIUM SERPL-SCNC: 5.3 MMOL/L (ref 3.6–5.5)
PROT SERPL-MCNC: 6.6 G/DL (ref 6–8.2)
RBC # BLD AUTO: 2.87 M/UL (ref 4.2–5.4)
SODIUM SERPL-SCNC: 142 MMOL/L (ref 135–145)
WBC # BLD AUTO: 4.1 K/UL (ref 4.8–10.8)

## 2025-04-28 PROCEDURE — 85025 COMPLETE CBC W/AUTO DIFF WBC: CPT

## 2025-04-28 PROCEDURE — 85055 RETICULATED PLATELET ASSAY: CPT

## 2025-04-28 PROCEDURE — 80053 COMPREHEN METABOLIC PANEL: CPT

## 2025-05-06 ENCOUNTER — APPOINTMENT (OUTPATIENT)
Dept: RADIOLOGY | Facility: MEDICAL CENTER | Age: 65
DRG: 871 | End: 2025-05-06
Attending: STUDENT IN AN ORGANIZED HEALTH CARE EDUCATION/TRAINING PROGRAM
Payer: MEDICARE

## 2025-05-06 ENCOUNTER — HOSPITAL ENCOUNTER (INPATIENT)
Facility: MEDICAL CENTER | Age: 65
LOS: 6 days | DRG: 871 | End: 2025-05-13
Attending: STUDENT IN AN ORGANIZED HEALTH CARE EDUCATION/TRAINING PROGRAM | Admitting: STUDENT IN AN ORGANIZED HEALTH CARE EDUCATION/TRAINING PROGRAM
Payer: MEDICARE

## 2025-05-06 DIAGNOSIS — I48.91 ATRIAL FIBRILLATION WITH RAPID VENTRICULAR RESPONSE (HCC): ICD-10-CM

## 2025-05-06 DIAGNOSIS — N17.9 SEPSIS WITH ACUTE RENAL FAILURE WITHOUT SEPTIC SHOCK, DUE TO UNSPECIFIED ORGANISM, UNSPECIFIED ACUTE RENAL FAILURE TYPE (HCC): ICD-10-CM

## 2025-05-06 DIAGNOSIS — R65.20 SEPSIS WITH ACUTE RENAL FAILURE WITHOUT SEPTIC SHOCK, DUE TO UNSPECIFIED ORGANISM, UNSPECIFIED ACUTE RENAL FAILURE TYPE (HCC): ICD-10-CM

## 2025-05-06 DIAGNOSIS — R78.81 BACTEREMIA DUE TO PSEUDOMONAS: ICD-10-CM

## 2025-05-06 DIAGNOSIS — A41.9 SEPSIS WITH ACUTE RENAL FAILURE WITHOUT SEPTIC SHOCK, DUE TO UNSPECIFIED ORGANISM, UNSPECIFIED ACUTE RENAL FAILURE TYPE (HCC): ICD-10-CM

## 2025-05-06 DIAGNOSIS — G89.4 CHRONIC PAIN SYNDROME: ICD-10-CM

## 2025-05-06 DIAGNOSIS — A41.9 SEPSIS, DUE TO UNSPECIFIED ORGANISM, UNSPECIFIED WHETHER ACUTE ORGAN DYSFUNCTION PRESENT (HCC): ICD-10-CM

## 2025-05-06 DIAGNOSIS — I48.0 PAROXYSMAL ATRIAL FIBRILLATION (HCC): ICD-10-CM

## 2025-05-06 DIAGNOSIS — B96.5 BACTEREMIA DUE TO PSEUDOMONAS: ICD-10-CM

## 2025-05-06 DIAGNOSIS — R68.89 FLU-LIKE SYMPTOMS: ICD-10-CM

## 2025-05-06 LAB
ALBUMIN SERPL BCP-MCNC: 2.9 G/DL (ref 3.2–4.9)
ALBUMIN/GLOB SERPL: 0.7 G/DL
ALP SERPL-CCNC: 211 U/L (ref 30–99)
ALT SERPL-CCNC: 16 U/L (ref 2–50)
ANION GAP SERPL CALC-SCNC: 9 MMOL/L (ref 7–16)
AST SERPL-CCNC: 28 U/L (ref 12–45)
BASOPHILS # BLD AUTO: 0.6 % (ref 0–1.8)
BASOPHILS # BLD: 0.04 K/UL (ref 0–0.12)
BILIRUB SERPL-MCNC: 0.8 MG/DL (ref 0.1–1.5)
BUN SERPL-MCNC: 45 MG/DL (ref 8–22)
CALCIUM ALBUM COR SERPL-MCNC: 9.1 MG/DL (ref 8.5–10.5)
CALCIUM SERPL-MCNC: 8.2 MG/DL (ref 8.5–10.5)
CHLORIDE SERPL-SCNC: 110 MMOL/L (ref 96–112)
CO2 SERPL-SCNC: 17 MMOL/L (ref 20–33)
CREAT SERPL-MCNC: 1.35 MG/DL (ref 0.5–1.4)
EKG IMPRESSION: NORMAL
EOSINOPHIL # BLD AUTO: 0.26 K/UL (ref 0–0.51)
EOSINOPHIL NFR BLD: 4.2 % (ref 0–6.9)
ERYTHROCYTE [DISTWIDTH] IN BLOOD BY AUTOMATED COUNT: 52.4 FL (ref 35.9–50)
FLUAV RNA SPEC QL NAA+PROBE: NEGATIVE
FLUBV RNA SPEC QL NAA+PROBE: NEGATIVE
GFR SERPLBLD CREATININE-BSD FMLA CKD-EPI: 44 ML/MIN/1.73 M 2
GLOBULIN SER CALC-MCNC: 4.3 G/DL (ref 1.9–3.5)
GLUCOSE SERPL-MCNC: 110 MG/DL (ref 65–99)
HCT VFR BLD AUTO: 25.1 % (ref 37–47)
HGB BLD-MCNC: 7.8 G/DL (ref 12–16)
IMM GRANULOCYTES # BLD AUTO: 0.03 K/UL (ref 0–0.11)
IMM GRANULOCYTES NFR BLD AUTO: 0.5 % (ref 0–0.9)
LACTATE SERPL-SCNC: 1.6 MMOL/L (ref 0.5–2)
LYMPHOCYTES # BLD AUTO: 0.6 K/UL (ref 1–4.8)
LYMPHOCYTES NFR BLD: 9.7 % (ref 22–41)
MCH RBC QN AUTO: 28.1 PG (ref 27–33)
MCHC RBC AUTO-ENTMCNC: 31.1 G/DL (ref 32.2–35.5)
MCV RBC AUTO: 90.3 FL (ref 81.4–97.8)
MONOCYTES # BLD AUTO: 0.21 K/UL (ref 0–0.85)
MONOCYTES NFR BLD AUTO: 3.4 % (ref 0–13.4)
NEUTROPHILS # BLD AUTO: 5.04 K/UL (ref 1.82–7.42)
NEUTROPHILS NFR BLD: 81.6 % (ref 44–72)
NRBC # BLD AUTO: 0 K/UL
NRBC BLD-RTO: 0 /100 WBC (ref 0–0.2)
NT-PROBNP SERPL IA-MCNC: 894 PG/ML (ref 0–125)
PLATELET # BLD AUTO: 86 K/UL (ref 164–446)
PLATELETS.RETICULATED NFR BLD AUTO: 0.8 % (ref 0.6–13.1)
PMV BLD AUTO: 10.6 FL (ref 9–12.9)
POTASSIUM SERPL-SCNC: 4.5 MMOL/L (ref 3.6–5.5)
PROT SERPL-MCNC: 7.2 G/DL (ref 6–8.2)
RBC # BLD AUTO: 2.78 M/UL (ref 4.2–5.4)
RSV RNA SPEC QL NAA+PROBE: NEGATIVE
SARS-COV-2 RNA RESP QL NAA+PROBE: NOTDETECTED
SODIUM SERPL-SCNC: 136 MMOL/L (ref 135–145)
TROPONIN T SERPL-MCNC: 25 NG/L (ref 6–19)
WBC # BLD AUTO: 6.2 K/UL (ref 4.8–10.8)

## 2025-05-06 PROCEDURE — 87015 SPECIMEN INFECT AGNT CONCNTJ: CPT

## 2025-05-06 PROCEDURE — 700105 HCHG RX REV CODE 258: Mod: UD | Performed by: STUDENT IN AN ORGANIZED HEALTH CARE EDUCATION/TRAINING PROGRAM

## 2025-05-06 PROCEDURE — 99285 EMERGENCY DEPT VISIT HI MDM: CPT

## 2025-05-06 PROCEDURE — 87186 SC STD MICRODIL/AGAR DIL: CPT

## 2025-05-06 PROCEDURE — 85025 COMPLETE CBC W/AUTO DIFF WBC: CPT

## 2025-05-06 PROCEDURE — 36415 COLL VENOUS BLD VENIPUNCTURE: CPT

## 2025-05-06 PROCEDURE — 84484 ASSAY OF TROPONIN QUANT: CPT

## 2025-05-06 PROCEDURE — 83880 ASSAY OF NATRIURETIC PEPTIDE: CPT

## 2025-05-06 PROCEDURE — 83605 ASSAY OF LACTIC ACID: CPT

## 2025-05-06 PROCEDURE — 80053 COMPREHEN METABOLIC PANEL: CPT

## 2025-05-06 PROCEDURE — 0241U HCHG SARS-COV-2 COVID-19 NFCT DS RESP RNA 4 TRGT ED POC: CPT

## 2025-05-06 PROCEDURE — 87040 BLOOD CULTURE FOR BACTERIA: CPT

## 2025-05-06 PROCEDURE — 85055 RETICULATED PLATELET ASSAY: CPT

## 2025-05-06 PROCEDURE — 87077 CULTURE AEROBIC IDENTIFY: CPT

## 2025-05-06 PROCEDURE — 93005 ELECTROCARDIOGRAM TRACING: CPT | Mod: TC | Performed by: STUDENT IN AN ORGANIZED HEALTH CARE EDUCATION/TRAINING PROGRAM

## 2025-05-06 PROCEDURE — 85610 PROTHROMBIN TIME: CPT

## 2025-05-06 PROCEDURE — 71045 X-RAY EXAM CHEST 1 VIEW: CPT

## 2025-05-06 RX ORDER — IBUPROFEN 600 MG/1
1 TABLET ORAL
COMMUNITY

## 2025-05-06 RX ORDER — OXYCODONE HYDROCHLORIDE 5 MG/1
5 TABLET ORAL EVERY 6 HOURS PRN
Status: ON HOLD | COMMUNITY
Start: 2025-04-18 | End: 2025-05-13

## 2025-05-06 RX ORDER — SODIUM PHOSPHATE,MONO-DIBASIC 19G-7G/118
1 ENEMA (ML) RECTAL PRN
Status: ON HOLD | COMMUNITY
End: 2025-05-13

## 2025-05-06 RX ORDER — BISACODYL 10 MG
10 SUPPOSITORY, RECTAL RECTAL PRN
COMMUNITY

## 2025-05-06 RX ORDER — ACETAMINOPHEN 500 MG
1000 TABLET ORAL ONCE
Status: DISCONTINUED | OUTPATIENT
Start: 2025-05-06 | End: 2025-05-06

## 2025-05-06 RX ORDER — SODIUM CHLORIDE, SODIUM LACTATE, POTASSIUM CHLORIDE, CALCIUM CHLORIDE 600; 310; 30; 20 MG/100ML; MG/100ML; MG/100ML; MG/100ML
1000 INJECTION, SOLUTION INTRAVENOUS ONCE
Status: COMPLETED | OUTPATIENT
Start: 2025-05-06 | End: 2025-05-06

## 2025-05-06 RX ORDER — METOPROLOL SUCCINATE 50 MG/1
50 TABLET, EXTENDED RELEASE ORAL DAILY
Status: ON HOLD | COMMUNITY
End: 2025-05-13

## 2025-05-06 RX ADMIN — SODIUM CHLORIDE, POTASSIUM CHLORIDE, SODIUM LACTATE AND CALCIUM CHLORIDE 1000 ML: 600; 310; 30; 20 INJECTION, SOLUTION INTRAVENOUS at 21:29

## 2025-05-06 ASSESSMENT — FIBROSIS 4 INDEX: FIB4 SCORE: 5.05

## 2025-05-07 ENCOUNTER — APPOINTMENT (OUTPATIENT)
Dept: RADIOLOGY | Facility: MEDICAL CENTER | Age: 65
DRG: 871 | End: 2025-05-07
Attending: INTERNAL MEDICINE
Payer: MEDICARE

## 2025-05-07 ENCOUNTER — APPOINTMENT (OUTPATIENT)
Dept: RADIOLOGY | Facility: MEDICAL CENTER | Age: 65
DRG: 871 | End: 2025-05-07
Attending: STUDENT IN AN ORGANIZED HEALTH CARE EDUCATION/TRAINING PROGRAM
Payer: MEDICARE

## 2025-05-07 PROBLEM — R79.89 ELEVATED TROPONIN: Status: ACTIVE | Noted: 2025-05-07

## 2025-05-07 PROBLEM — Z89.612 HX OF AKA (ABOVE KNEE AMPUTATION), LEFT (HCC): Status: ACTIVE | Noted: 2025-05-07

## 2025-05-07 PROBLEM — A41.9 SEPSIS (HCC): Status: ACTIVE | Noted: 2025-05-07

## 2025-05-07 PROBLEM — E11.9 DM2 (DIABETES MELLITUS, TYPE 2) (HCC): Status: ACTIVE | Noted: 2025-05-07

## 2025-05-07 LAB
ALBUMIN SERPL BCP-MCNC: 2.5 G/DL (ref 3.2–4.9)
ALBUMIN/GLOB SERPL: 0.6 G/DL
ALP SERPL-CCNC: 166 U/L (ref 30–99)
ALT SERPL-CCNC: 11 U/L (ref 2–50)
ANION GAP SERPL CALC-SCNC: 9 MMOL/L (ref 7–16)
ANISOCYTOSIS BLD QL SMEAR: ABNORMAL
APPEARANCE UR: CLEAR
AST SERPL-CCNC: 25 U/L (ref 12–45)
B PARAP IS1001 DNA NPH QL NAA+NON-PROBE: NOT DETECTED
B PERT.PT PRMT NPH QL NAA+NON-PROBE: NOT DETECTED
BACTERIA #/AREA URNS HPF: ABNORMAL /HPF
BASOPHILS # BLD AUTO: 0 % (ref 0–1.8)
BASOPHILS # BLD: 0 K/UL (ref 0–0.12)
BILIRUB SERPL-MCNC: 0.9 MG/DL (ref 0.1–1.5)
BILIRUB UR QL STRIP.AUTO: NEGATIVE
BUN SERPL-MCNC: 42 MG/DL (ref 8–22)
C PNEUM DNA NPH QL NAA+NON-PROBE: NOT DETECTED
CALCIUM ALBUM COR SERPL-MCNC: 9.1 MG/DL (ref 8.5–10.5)
CALCIUM SERPL-MCNC: 7.9 MG/DL (ref 8.5–10.5)
CASTS URNS QL MICRO: ABNORMAL /LPF (ref 0–2)
CHLORIDE SERPL-SCNC: 111 MMOL/L (ref 96–112)
CO2 SERPL-SCNC: 16 MMOL/L (ref 20–33)
COLOR UR: YELLOW
CREAT SERPL-MCNC: 1.2 MG/DL (ref 0.5–1.4)
EOSINOPHIL # BLD AUTO: 0.23 K/UL (ref 0–0.51)
EOSINOPHIL NFR BLD: 2.7 % (ref 0–6.9)
EPITHELIAL CELLS 1715: ABNORMAL /HPF (ref 0–5)
ERYTHROCYTE [DISTWIDTH] IN BLOOD BY AUTOMATED COUNT: 53.6 FL (ref 35.9–50)
FLUAV RNA NPH QL NAA+NON-PROBE: NOT DETECTED
FLUBV RNA NPH QL NAA+NON-PROBE: NOT DETECTED
GFR SERPLBLD CREATININE-BSD FMLA CKD-EPI: 50 ML/MIN/1.73 M 2
GLOBULIN SER CALC-MCNC: 4 G/DL (ref 1.9–3.5)
GLUCOSE BLD STRIP.AUTO-MCNC: 151 MG/DL (ref 65–99)
GLUCOSE BLD STRIP.AUTO-MCNC: 97 MG/DL (ref 65–99)
GLUCOSE BLD STRIP.AUTO-MCNC: 98 MG/DL (ref 65–99)
GLUCOSE SERPL-MCNC: 86 MG/DL (ref 65–99)
GLUCOSE UR STRIP.AUTO-MCNC: NEGATIVE MG/DL
HADV DNA NPH QL NAA+NON-PROBE: NOT DETECTED
HCOV 229E RNA NPH QL NAA+NON-PROBE: NOT DETECTED
HCOV HKU1 RNA NPH QL NAA+NON-PROBE: NOT DETECTED
HCOV NL63 RNA NPH QL NAA+NON-PROBE: NOT DETECTED
HCOV OC43 RNA NPH QL NAA+NON-PROBE: NOT DETECTED
HCT VFR BLD AUTO: 25.4 % (ref 37–47)
HGB BLD-MCNC: 7.9 G/DL (ref 12–16)
HMPV RNA NPH QL NAA+NON-PROBE: NOT DETECTED
HPIV1 RNA NPH QL NAA+NON-PROBE: NOT DETECTED
HPIV2 RNA NPH QL NAA+NON-PROBE: NOT DETECTED
HPIV3 RNA NPH QL NAA+NON-PROBE: NOT DETECTED
HPIV4 RNA NPH QL NAA+NON-PROBE: NOT DETECTED
INR PPP: 1.43 (ref 0.87–1.13)
KETONES UR STRIP.AUTO-MCNC: ABNORMAL MG/DL
LEUKOCYTE ESTERASE UR QL STRIP.AUTO: ABNORMAL
LYMPHOCYTES # BLD AUTO: 0.23 K/UL (ref 1–4.8)
LYMPHOCYTES NFR BLD: 2.6 % (ref 22–41)
M PNEUMO DNA NPH QL NAA+NON-PROBE: NOT DETECTED
MACROCYTES BLD QL SMEAR: ABNORMAL
MANUAL DIFF BLD: NORMAL
MCH RBC QN AUTO: 28.2 PG (ref 27–33)
MCHC RBC AUTO-ENTMCNC: 31.1 G/DL (ref 32.2–35.5)
MCV RBC AUTO: 90.7 FL (ref 81.4–97.8)
MICRO URNS: ABNORMAL
MONOCYTES # BLD AUTO: 0.2 K/UL (ref 0–0.85)
MONOCYTES NFR BLD AUTO: 2.6 % (ref 0–13.4)
MORPHOLOGY BLD-IMP: NORMAL
NEUTROPHILS # BLD AUTO: 8.01 K/UL (ref 1.82–7.42)
NEUTROPHILS NFR BLD: 89.5 % (ref 44–72)
NEUTS BAND NFR BLD MANUAL: 2.6 % (ref 0–10)
NITRITE UR QL STRIP.AUTO: NEGATIVE
NRBC # BLD AUTO: 0 K/UL
NRBC BLD-RTO: 0 /100 WBC (ref 0–0.2)
OVALOCYTES BLD QL SMEAR: NORMAL
PH UR STRIP.AUTO: 5 [PH] (ref 5–8)
PLATELET # BLD AUTO: 72 K/UL (ref 164–446)
PLATELET BLD QL SMEAR: NORMAL
PLATELETS.RETICULATED NFR BLD AUTO: 1.5 % (ref 0.6–13.1)
PMV BLD AUTO: 12.8 FL (ref 9–12.9)
POIKILOCYTOSIS BLD QL SMEAR: NORMAL
POTASSIUM SERPL-SCNC: 4.6 MMOL/L (ref 3.6–5.5)
PROT SERPL-MCNC: 6.5 G/DL (ref 6–8.2)
PROT UR QL STRIP: NEGATIVE MG/DL
PROTHROMBIN TIME: 17.5 SEC (ref 12–14.6)
RBC # BLD AUTO: 2.8 M/UL (ref 4.2–5.4)
RBC # URNS HPF: ABNORMAL /HPF (ref 0–2)
RBC BLD AUTO: PRESENT
RBC UR QL AUTO: ABNORMAL
RSV RNA NPH QL NAA+NON-PROBE: NOT DETECTED
RV+EV RNA NPH QL NAA+NON-PROBE: NOT DETECTED
SARS-COV-2 RNA NPH QL NAA+NON-PROBE: NOTDETECTED
SCHISTOCYTES BLD QL SMEAR: NORMAL
SODIUM SERPL-SCNC: 136 MMOL/L (ref 135–145)
SP GR UR STRIP.AUTO: 1.02
TROPONIN T SERPL-MCNC: 19 NG/L (ref 6–19)
UROBILINOGEN UR STRIP.AUTO-MCNC: 0.2 EU/DL
WBC # BLD AUTO: 8.7 K/UL (ref 4.8–10.8)
WBC #/AREA URNS HPF: ABNORMAL /HPF

## 2025-05-07 PROCEDURE — 96374 THER/PROPH/DIAG INJ IV PUSH: CPT

## 2025-05-07 PROCEDURE — 700117 HCHG RX CONTRAST REV CODE 255: Mod: UD | Performed by: STUDENT IN AN ORGANIZED HEALTH CARE EDUCATION/TRAINING PROGRAM

## 2025-05-07 PROCEDURE — 80053 COMPREHEN METABOLIC PANEL: CPT

## 2025-05-07 PROCEDURE — 85007 BL SMEAR W/DIFF WBC COUNT: CPT

## 2025-05-07 PROCEDURE — 82962 GLUCOSE BLOOD TEST: CPT

## 2025-05-07 PROCEDURE — 87086 URINE CULTURE/COLONY COUNT: CPT

## 2025-05-07 PROCEDURE — A9270 NON-COVERED ITEM OR SERVICE: HCPCS | Performed by: STUDENT IN AN ORGANIZED HEALTH CARE EDUCATION/TRAINING PROGRAM

## 2025-05-07 PROCEDURE — 700105 HCHG RX REV CODE 258: Performed by: INTERNAL MEDICINE

## 2025-05-07 PROCEDURE — 770020 HCHG ROOM/CARE - TELE (206)

## 2025-05-07 PROCEDURE — 96375 TX/PRO/DX INJ NEW DRUG ADDON: CPT

## 2025-05-07 PROCEDURE — 700102 HCHG RX REV CODE 250 W/ 637 OVERRIDE(OP): Performed by: STUDENT IN AN ORGANIZED HEALTH CARE EDUCATION/TRAINING PROGRAM

## 2025-05-07 PROCEDURE — 87077 CULTURE AEROBIC IDENTIFY: CPT

## 2025-05-07 PROCEDURE — 76705 ECHO EXAM OF ABDOMEN: CPT

## 2025-05-07 PROCEDURE — 96372 THER/PROPH/DIAG INJ SC/IM: CPT

## 2025-05-07 PROCEDURE — 700105 HCHG RX REV CODE 258: Performed by: STUDENT IN AN ORGANIZED HEALTH CARE EDUCATION/TRAINING PROGRAM

## 2025-05-07 PROCEDURE — A9270 NON-COVERED ITEM OR SERVICE: HCPCS | Performed by: INTERNAL MEDICINE

## 2025-05-07 PROCEDURE — 700102 HCHG RX REV CODE 250 W/ 637 OVERRIDE(OP): Performed by: INTERNAL MEDICINE

## 2025-05-07 PROCEDURE — 84484 ASSAY OF TROPONIN QUANT: CPT

## 2025-05-07 PROCEDURE — 81001 URINALYSIS AUTO W/SCOPE: CPT

## 2025-05-07 PROCEDURE — 700111 HCHG RX REV CODE 636 W/ 250 OVERRIDE (IP): Performed by: STUDENT IN AN ORGANIZED HEALTH CARE EDUCATION/TRAINING PROGRAM

## 2025-05-07 PROCEDURE — 700111 HCHG RX REV CODE 636 W/ 250 OVERRIDE (IP): Mod: JZ | Performed by: INTERNAL MEDICINE

## 2025-05-07 PROCEDURE — 71260 CT THORAX DX C+: CPT

## 2025-05-07 PROCEDURE — 700101 HCHG RX REV CODE 250: Performed by: INTERNAL MEDICINE

## 2025-05-07 PROCEDURE — 85055 RETICULATED PLATELET ASSAY: CPT

## 2025-05-07 PROCEDURE — 700111 HCHG RX REV CODE 636 W/ 250 OVERRIDE (IP): Mod: JZ,UD | Performed by: STUDENT IN AN ORGANIZED HEALTH CARE EDUCATION/TRAINING PROGRAM

## 2025-05-07 PROCEDURE — 87186 SC STD MICRODIL/AGAR DIL: CPT

## 2025-05-07 PROCEDURE — 85027 COMPLETE CBC AUTOMATED: CPT

## 2025-05-07 PROCEDURE — 99223 1ST HOSP IP/OBS HIGH 75: CPT | Mod: AI | Performed by: STUDENT IN AN ORGANIZED HEALTH CARE EDUCATION/TRAINING PROGRAM

## 2025-05-07 PROCEDURE — 36415 COLL VENOUS BLD VENIPUNCTURE: CPT

## 2025-05-07 PROCEDURE — 0202U NFCT DS 22 TRGT SARS-COV-2: CPT

## 2025-05-07 RX ORDER — INSULIN LISPRO 100 [IU]/ML
1-6 INJECTION, SOLUTION INTRAVENOUS; SUBCUTANEOUS
Status: DISCONTINUED | OUTPATIENT
Start: 2025-05-07 | End: 2025-05-10 | Stop reason: ALTCHOICE

## 2025-05-07 RX ORDER — GABAPENTIN 100 MG/1
200 CAPSULE ORAL 2 TIMES DAILY
Status: DISCONTINUED | OUTPATIENT
Start: 2025-05-07 | End: 2025-05-13 | Stop reason: HOSPADM

## 2025-05-07 RX ORDER — ONDANSETRON 2 MG/ML
4 INJECTION INTRAMUSCULAR; INTRAVENOUS EVERY 4 HOURS PRN
Status: DISCONTINUED | OUTPATIENT
Start: 2025-05-07 | End: 2025-05-13 | Stop reason: HOSPADM

## 2025-05-07 RX ORDER — INSULIN LISPRO 100 [IU]/ML
1-6 INJECTION, SOLUTION INTRAVENOUS; SUBCUTANEOUS EVERY 6 HOURS
Status: DISCONTINUED | OUTPATIENT
Start: 2025-05-07 | End: 2025-05-07

## 2025-05-07 RX ORDER — METOPROLOL SUCCINATE 50 MG/1
50 TABLET, EXTENDED RELEASE ORAL DAILY
Status: DISCONTINUED | OUTPATIENT
Start: 2025-05-07 | End: 2025-05-07

## 2025-05-07 RX ORDER — OXYCODONE HYDROCHLORIDE 5 MG/1
5 TABLET ORAL EVERY 6 HOURS PRN
Refills: 0 | Status: DISCONTINUED | OUTPATIENT
Start: 2025-05-07 | End: 2025-05-13 | Stop reason: HOSPADM

## 2025-05-07 RX ORDER — FUROSEMIDE 40 MG/1
40 TABLET ORAL DAILY
Status: DISCONTINUED | OUTPATIENT
Start: 2025-05-07 | End: 2025-05-13 | Stop reason: HOSPADM

## 2025-05-07 RX ORDER — METOPROLOL TARTRATE 1 MG/ML
5 INJECTION, SOLUTION INTRAVENOUS
Status: DISCONTINUED | OUTPATIENT
Start: 2025-05-07 | End: 2025-05-13 | Stop reason: HOSPADM

## 2025-05-07 RX ORDER — DEXTROSE MONOHYDRATE 25 G/50ML
25 INJECTION, SOLUTION INTRAVENOUS
Status: DISCONTINUED | OUTPATIENT
Start: 2025-05-07 | End: 2025-05-13 | Stop reason: HOSPADM

## 2025-05-07 RX ORDER — DEXTROSE MONOHYDRATE 25 G/50ML
25 INJECTION, SOLUTION INTRAVENOUS
Status: DISCONTINUED | OUTPATIENT
Start: 2025-05-07 | End: 2025-05-07

## 2025-05-07 RX ORDER — PANTOPRAZOLE SODIUM 20 MG/1
20 TABLET, DELAYED RELEASE ORAL EVERY MORNING
Status: DISCONTINUED | OUTPATIENT
Start: 2025-05-07 | End: 2025-05-07

## 2025-05-07 RX ORDER — SODIUM CHLORIDE 9 MG/ML
500 INJECTION, SOLUTION INTRAVENOUS ONCE
Status: COMPLETED | OUTPATIENT
Start: 2025-05-07 | End: 2025-05-07

## 2025-05-07 RX ORDER — DILTIAZEM HYDROCHLORIDE 5 MG/ML
0.25 INJECTION INTRAVENOUS ONCE
Status: COMPLETED | OUTPATIENT
Start: 2025-05-07 | End: 2025-05-07

## 2025-05-07 RX ORDER — ACETAMINOPHEN 325 MG/1
650 TABLET ORAL EVERY 6 HOURS PRN
Status: DISCONTINUED | OUTPATIENT
Start: 2025-05-07 | End: 2025-05-13 | Stop reason: HOSPADM

## 2025-05-07 RX ORDER — HEPARIN SODIUM 5000 [USP'U]/ML
5000 INJECTION, SOLUTION INTRAVENOUS; SUBCUTANEOUS EVERY 8 HOURS
Status: DISCONTINUED | OUTPATIENT
Start: 2025-05-08 | End: 2025-05-08

## 2025-05-07 RX ORDER — HEPARIN SODIUM 5000 [USP'U]/ML
5000 INJECTION, SOLUTION INTRAVENOUS; SUBCUTANEOUS EVERY 12 HOURS
Status: DISCONTINUED | OUTPATIENT
Start: 2025-05-07 | End: 2025-05-07

## 2025-05-07 RX ORDER — ONDANSETRON 4 MG/1
4 TABLET, ORALLY DISINTEGRATING ORAL EVERY 4 HOURS PRN
Status: DISCONTINUED | OUTPATIENT
Start: 2025-05-07 | End: 2025-05-13 | Stop reason: HOSPADM

## 2025-05-07 RX ORDER — ATORVASTATIN CALCIUM 20 MG/1
20 TABLET, FILM COATED ORAL NIGHTLY
Status: DISCONTINUED | OUTPATIENT
Start: 2025-05-07 | End: 2025-05-12

## 2025-05-07 RX ORDER — SODIUM CHLORIDE, SODIUM LACTATE, POTASSIUM CHLORIDE, AND CALCIUM CHLORIDE .6; .31; .03; .02 G/100ML; G/100ML; G/100ML; G/100ML
1232 INJECTION, SOLUTION INTRAVENOUS ONCE
Status: COMPLETED | OUTPATIENT
Start: 2025-05-07 | End: 2025-05-07

## 2025-05-07 RX ORDER — ALBUTEROL SULFATE 90 UG/1
2 INHALANT RESPIRATORY (INHALATION) EVERY 4 HOURS PRN
Status: DISCONTINUED | OUTPATIENT
Start: 2025-05-07 | End: 2025-05-13 | Stop reason: HOSPADM

## 2025-05-07 RX ORDER — OMEPRAZOLE 20 MG/1
20 CAPSULE, DELAYED RELEASE ORAL DAILY
Status: DISCONTINUED | OUTPATIENT
Start: 2025-05-07 | End: 2025-05-09

## 2025-05-07 RX ORDER — METOPROLOL SUCCINATE 50 MG/1
50 TABLET, EXTENDED RELEASE ORAL DAILY
Status: DISCONTINUED | OUTPATIENT
Start: 2025-05-07 | End: 2025-05-09

## 2025-05-07 RX ORDER — LACTULOSE 10 G/15ML
30 SOLUTION ORAL 4 TIMES DAILY
Status: DISCONTINUED | OUTPATIENT
Start: 2025-05-07 | End: 2025-05-12

## 2025-05-07 RX ORDER — METRONIDAZOLE 500 MG/100ML
500 INJECTION, SOLUTION INTRAVENOUS EVERY 12 HOURS
Status: DISCONTINUED | OUTPATIENT
Start: 2025-05-07 | End: 2025-05-07

## 2025-05-07 RX ADMIN — IOHEXOL 100 ML: 350 INJECTION, SOLUTION INTRAVENOUS at 01:00

## 2025-05-07 RX ADMIN — OXYCODONE 5 MG: 5 TABLET ORAL at 17:10

## 2025-05-07 RX ADMIN — OMEPRAZOLE 20 MG: 20 CAPSULE, DELAYED RELEASE ORAL at 06:01

## 2025-05-07 RX ADMIN — DILTIAZEM HYDROCHLORIDE 18.6 MG: 5 INJECTION, SOLUTION INTRAVENOUS at 01:20

## 2025-05-07 RX ADMIN — GABAPENTIN 200 MG: 100 CAPSULE ORAL at 06:01

## 2025-05-07 RX ADMIN — CEFTRIAXONE SODIUM 1000 MG: 10 INJECTION, POWDER, FOR SOLUTION INTRAVENOUS at 12:58

## 2025-05-07 RX ADMIN — GABAPENTIN 200 MG: 100 CAPSULE ORAL at 17:04

## 2025-05-07 RX ADMIN — PIPERACILLIN AND TAZOBACTAM 4.5 G: 4; .5 INJECTION, POWDER, FOR SOLUTION INTRAVENOUS at 21:56

## 2025-05-07 RX ADMIN — SODIUM CHLORIDE, POTASSIUM CHLORIDE, SODIUM LACTATE AND CALCIUM CHLORIDE 1232 ML: 600; 310; 30; 20 INJECTION, SOLUTION INTRAVENOUS at 04:51

## 2025-05-07 RX ADMIN — INSULIN LISPRO 1 UNITS: 100 INJECTION, SOLUTION INTRAVENOUS; SUBCUTANEOUS at 21:48

## 2025-05-07 RX ADMIN — SODIUM CHLORIDE 500 ML: 9 INJECTION, SOLUTION INTRAVENOUS at 09:26

## 2025-05-07 RX ADMIN — ATORVASTATIN CALCIUM 20 MG: 20 TABLET, FILM COATED ORAL at 21:41

## 2025-05-07 RX ADMIN — METOPROLOL TARTRATE 5 MG: 5 INJECTION INTRAVENOUS at 17:41

## 2025-05-07 RX ADMIN — SODIUM CHLORIDE 500 ML: 9 INJECTION, SOLUTION INTRAVENOUS at 17:46

## 2025-05-07 RX ADMIN — PIPERACILLIN AND TAZOBACTAM 4.5 G: 4; .5 INJECTION, POWDER, FOR SOLUTION INTRAVENOUS at 18:43

## 2025-05-07 RX ADMIN — METRONIDAZOLE 500 MG: 5 INJECTION, SOLUTION INTRAVENOUS at 17:04

## 2025-05-07 RX ADMIN — METOPROLOL TARTRATE 5 MG: 5 INJECTION INTRAVENOUS at 18:18

## 2025-05-07 RX ADMIN — METOPROLOL SUCCINATE 50 MG: 50 TABLET, EXTENDED RELEASE ORAL at 17:41

## 2025-05-07 RX ADMIN — HEPARIN SODIUM 5000 UNITS: 5000 INJECTION, SOLUTION INTRAVENOUS; SUBCUTANEOUS at 06:01

## 2025-05-07 ASSESSMENT — COGNITIVE AND FUNCTIONAL STATUS - GENERAL
TOILETING: A LITTLE
STANDING UP FROM CHAIR USING ARMS: A LOT
CLIMB 3 TO 5 STEPS WITH RAILING: TOTAL
PERSONAL GROOMING: A LITTLE
MOBILITY SCORE: 13
EATING MEALS: A LITTLE
TURNING FROM BACK TO SIDE WHILE IN FLAT BAD: A LITTLE
WALKING IN HOSPITAL ROOM: A LOT
SUGGESTED CMS G CODE MODIFIER MOBILITY: CL
DRESSING REGULAR LOWER BODY CLOTHING: A LITTLE
DAILY ACTIVITIY SCORE: 17
MOVING TO AND FROM BED TO CHAIR: A LOT
DRESSING REGULAR UPPER BODY CLOTHING: A LITTLE
HELP NEEDED FOR BATHING: A LOT
MOVING FROM LYING ON BACK TO SITTING ON SIDE OF FLAT BED: A LITTLE
SUGGESTED CMS G CODE MODIFIER DAILY ACTIVITY: CK

## 2025-05-07 ASSESSMENT — ENCOUNTER SYMPTOMS
COUGH: 1
PSYCHIATRIC NEGATIVE: 1
NECK PAIN: 0
WHEEZING: 0
FEVER: 0
BLURRED VISION: 0
DIZZINESS: 0
HEADACHES: 0
ABDOMINAL PAIN: 0
EYES NEGATIVE: 1
DOUBLE VISION: 0
BACK PAIN: 0
CONSTIPATION: 0
SHORTNESS OF BREATH: 1
NAUSEA: 1
MYALGIAS: 1
BLOOD IN STOOL: 0
SHORTNESS OF BREATH: 0
DIARRHEA: 0
WEAKNESS: 1
SPUTUM PRODUCTION: 0
VOMITING: 1
DIZZINESS: 1
CHILLS: 1
EYE PAIN: 0
ABDOMINAL PAIN: 1
PALPITATIONS: 0
FEVER: 1

## 2025-05-07 ASSESSMENT — LIFESTYLE VARIABLES
CONSUMPTION TOTAL: NEGATIVE
EVER FELT BAD OR GUILTY ABOUT YOUR DRINKING: NO
HAVE PEOPLE ANNOYED YOU BY CRITICIZING YOUR DRINKING: NO
EVER HAD A DRINK FIRST THING IN THE MORNING TO STEADY YOUR NERVES TO GET RID OF A HANGOVER: NO
TOTAL SCORE: 0
ALCOHOL_USE: NO
HOW MANY TIMES IN THE PAST YEAR HAVE YOU HAD 5 OR MORE DRINKS IN A DAY: 0
TOTAL SCORE: 0
AVERAGE NUMBER OF DAYS PER WEEK YOU HAVE A DRINK CONTAINING ALCOHOL: 0
TOTAL SCORE: 0
ON A TYPICAL DAY WHEN YOU DRINK ALCOHOL HOW MANY DRINKS DO YOU HAVE: 0
DOES PATIENT WANT TO STOP DRINKING: NO
HAVE YOU EVER FELT YOU SHOULD CUT DOWN ON YOUR DRINKING: NO

## 2025-05-07 ASSESSMENT — SOCIAL DETERMINANTS OF HEALTH (SDOH)
WITHIN THE PAST 12 MONTHS, THE FOOD YOU BOUGHT JUST DIDN'T LAST AND YOU DIDN'T HAVE MONEY TO GET MORE: NEVER TRUE
IN THE PAST 12 MONTHS, HAS THE ELECTRIC, GAS, OIL, OR WATER COMPANY THREATENED TO SHUT OFF SERVICE IN YOUR HOME?: NO
WITHIN THE LAST YEAR, HAVE YOU BEEN AFRAID OF YOUR PARTNER OR EX-PARTNER?: NO
WITHIN THE LAST YEAR, HAVE YOU BEEN HUMILIATED OR EMOTIONALLY ABUSED IN OTHER WAYS BY YOUR PARTNER OR EX-PARTNER?: NO
WITHIN THE LAST YEAR, HAVE YOU BEEN KICKED, HIT, SLAPPED, OR OTHERWISE PHYSICALLY HURT BY YOUR PARTNER OR EX-PARTNER?: NO
WITHIN THE LAST YEAR, HAVE TO BEEN RAPED OR FORCED TO HAVE ANY KIND OF SEXUAL ACTIVITY BY YOUR PARTNER OR EX-PARTNER?: NO
WITHIN THE PAST 12 MONTHS, YOU WORRIED THAT YOUR FOOD WOULD RUN OUT BEFORE YOU GOT THE MONEY TO BUY MORE: NEVER TRUE

## 2025-05-07 ASSESSMENT — PAIN DESCRIPTION - PAIN TYPE
TYPE: ACUTE PAIN
TYPE: CHRONIC PAIN
TYPE: ACUTE PAIN
TYPE: CHRONIC PAIN

## 2025-05-07 NOTE — ASSESSMENT & PLAN NOTE
-Patient denies LUTS.  UA with trace ketones, large occult blood, negative nitrite, moderate leukocyte esterase, 21-50 WBC, 21-50 RBC, many bacteria.  - Although low suspicion for UTI, patient is on IV Zosyn for pseudomonas bacteremia

## 2025-05-07 NOTE — PROGRESS NOTES
[facility] Medicine Daily Progress Note    Date of Service  5/7/2025    Chief Complaint  April Alicia is a 65 y.o. female admitted 5/6/2025 with fevers, chills, cough.    Hospital Course  Patient is a 65-year-old female with past medical history of alcoholic cirrhosis, migraine, HTN, paroxysmal atrial fibrillation not on anticoagulation, GERD, chronic pain, NIDDM 2, seizure disorder, asthma, status post left AKA that presents with 1 day history of malaise, dry cough, fever, chills.     Patient states that they have been having a dry cough, full body soreness, fever, chills since yesterday.  Denies any lower urinary tract symptoms.  States that they would always have abdominal pain which is unchanged.     In the ED, BP 90s to 130s/50s to 90s, HR 60s to 150s, RR 11-21, Tmax 100.8 F, saturating well on room air.  Blood cultures x 2 drawn in the ED.  Received LR 1 L bolus x 1, diltiazem 18.6 mg IV x 1.  WBC 6.2, hemoglobin 7.8, PLT 86, bicarb 17, anion gap 9, BUN 45, creatinine 1.35, alk phos 211, lactic acid 1.6, troponin 25>> 19, NT proBNP 894.  COVID/flu/RSV negative.  UA with trace ketones, large occult blood, negative nitrite, moderate leukocyte esterase, 21-50 WBC, 21-50 RBC, many bacteria.  Chest x-ray with trace bilateral pleural effusions.  CT chest abdomen pelvis with contrast showing cirrhosis with recannulization of the umbilical vein and prominent anterior abdominal wall varices compatible with portal hypertension, splenomegaly, moderate scattered abdominal ascites, small bilateral pleural effusions left greater than right, prominent main pulmonary artery suggesting pulmonary arterial hypertension.  EKG atrial fibrillation , QTc 484, with diffuse T wave flattening, similar to previous.    Interval Problem Update  5/7 patient reports symptoms of subjective fevers, chills, nausea, vomiting, abdominal pain, shallow breathing. Reports feeling very tired and fatigued and unable to ambulate.  She  denies any dysuria.  She is noted to be anemic with a hemoglobin of 7.8 and thrombocytopenic with a platelet count of 86.  Monitor CBC and transfuse for hemoglobin less than 7 or platelets less than 10 or if she develops active bleeding.  Ultrasound-guided paracentesis is pending.  Continue empiric IV ceftriaxone and Flagyl    I have discussed this patient's plan of care and discharge plan at IDT rounds today with Case Management, Nursing, Nursing leadership, and other members of the IDT team.    Consultants/Specialty  None    Code Status  Full Code    Disposition  The patient is not medically cleared for discharge to home or a post-acute facility.  Anticipate discharge to: home with organized home healthcare and close outpatient follow-up    I have placed the appropriate orders for post-discharge needs.    Review of Systems  Review of Systems   Constitutional:  Positive for chills and malaise/fatigue. Negative for fever.   HENT: Negative.     Eyes: Negative.    Respiratory:  Positive for cough and shortness of breath.    Cardiovascular:  Negative for chest pain.   Gastrointestinal:  Positive for abdominal pain, melena, nausea and vomiting.   Genitourinary:  Negative for dysuria.   Musculoskeletal:  Positive for myalgias.   Skin: Negative.    Neurological:  Positive for dizziness and weakness (Generalized).   Endo/Heme/Allergies: Negative.    Psychiatric/Behavioral: Negative.          Physical Exam  Temp:  [37.2 °C (99 °F)-38.2 °C (100.8 °F)] 37.2 °C (99 °F)  Pulse:  [] 78  Resp:  [11-33] 12  BP: ()/(52-97) 105/52  SpO2:  [96 %-99 %] 96 %    Physical Exam  Vitals and nursing note reviewed.   Constitutional:       Appearance: She is obese. She is ill-appearing.   HENT:      Head: Normocephalic and atraumatic.      Nose: Nose normal.      Mouth/Throat:      Mouth: Mucous membranes are dry.   Eyes:      Pupils: Pupils are equal, round, and reactive to light.   Cardiovascular:      Rate and Rhythm: Normal  rate. Rhythm irregular.      Pulses: Normal pulses.      Heart sounds: Normal heart sounds.   Pulmonary:      Effort: Pulmonary effort is normal.      Breath sounds: Rhonchi present.   Abdominal:      General: There is distension.      Tenderness: There is abdominal tenderness.   Musculoskeletal:         General: Normal range of motion.      Right lower leg: Edema present.      Left lower leg: Edema present.   Skin:     General: Skin is warm.      Capillary Refill: Capillary refill takes less than 2 seconds.   Neurological:      General: No focal deficit present.      Mental Status: She is alert.   Psychiatric:         Behavior: Behavior normal.         Fluids    Intake/Output Summary (Last 24 hours) at 5/7/2025 1212  Last data filed at 5/6/2025 2306  Gross per 24 hour   Intake 1000 ml   Output --   Net 1000 ml        Laboratory  Recent Labs     05/06/25 2116   WBC 6.2   RBC 2.78*   HEMOGLOBIN 7.8*   HEMATOCRIT 25.1*   MCV 90.3   MCH 28.1   MCHC 31.1*   RDW 52.4*   PLATELETCT 86*   MPV 10.6     Recent Labs     05/06/25 2116   SODIUM 136   POTASSIUM 4.5   CHLORIDE 110   CO2 17*   GLUCOSE 110*   BUN 45*   CREATININE 1.35   CALCIUM 8.2*     Recent Labs     05/06/25 2116   INR 1.43*               Imaging  CT-CHEST,ABDOMEN,PELVIS WITH   Final Result         1.  Cirrhosis with recanalization of the umbilical vein and prominent anterior abdominal wall varices, compatible with portal hypertension.   2.  Splenomegaly.   3.  Moderate scattered abdominal ascites.   4.  Small bilateral pleural effusions, left greater than right.   5.  Linear densities the bilateral lung bases favor changes of atelectasis.   6.  Cardiomegaly.   7.  Prominent main pulmonary artery, appearance suggests component of pulmonary arterial hypertension.   8.  Atherosclerosis and atherosclerotic coronary artery disease.      DX-CHEST-LIMITED (1 VIEW)   Final Result         1.  No acute cardiopulmonary disease.   2.  Trace bilateral pleural effusions    3.  Cardiomegaly.   4.  Atherosclerosis.      US-PARACENTESIS, ABD WITH IMAGING    (Results Pending)        Assessment/Plan  * Sepsis (HCC)- (present on admission)  Assessment & Plan  -SIRS criteria identified on my evaluation include: Fever, with temperature greater than 100.9 deg F, Tachycardia, with heart rate greater than 90 BPM, and Tachypnea, with respirations greater than 20 per minute  -Clinical indicators of end organ dysfunction include Hypotension with systolic blood pressure less than 90 or MAP less than 65 and Hypotension with decrease in systolic blood pressure of more than 40mmHg  -Source is intra-abdominal versus respiratory  -Sepsis protocol initiated  -Crystalloid Fluid Administration: Fluid resuscitation ordered per standard protocol - 30 mL/kg per current or ideal body weight  -Start IV ceftriaxone and IV Flagyl which will cover both SBP and respiratory source.  -Follow blood and urine cultures  -Order full respiratory panel  -Pending US paracentesis.  Ascites fluid studies ordered.    Atrial fibrillation with RVR (HCC)- (present on admission)  Assessment & Plan  -A-fib with RVR up to the 150s on admission.  Likely in the setting of sepsis. EKG atrial fibrillation , QTc 484, with diffuse T wave flattening, similar to previous.  -Received diltiazem 18.6 mg IV x 1 in the ED  -Hold off on any other AV bibi blockade at this time in the setting of sepsis with hypotension  -Currently rate controlled.  Monitor on telemetry.    ELIZABETH (acute kidney injury) (HCC)- (present on admission)  Assessment & Plan  -Creatinine 1.35, baseline 0.7-0.9.  Likely hypoperfusion in the setting of sepsis complicated by atrial fibrillation with RVR.  -Daily BMP  -Avoid nephrotoxic medications  -Renally dose medications    Alcoholic cirrhosis (HCC)- (present on admission)  Assessment & Plan  -Possible that patient has SBP given sepsis with unknown source at this time.    -CT showing cirrhosis with evidence of portal  hypertension, moderate scattered abdominal ascites.  Quit drinking 10 years ago.  -Hold home furosemide, lactulose, rifaximin in the setting of sepsis with hypotension and ELIZABETH  -ED physician not able to find sufficient pocket for paracentesis.  US paracentesis ordered.  -Start empiric ceftriaxone and IV Flagyl.  -Follow blood and urine cultures    DM2 (diabetes mellitus, type 2) ([facility])- (present on admission)  Assessment & Plan  - Hypoglycemia protocol  - Diabetic diet  - SSI    Elevated troponin- (present on admission)  Assessment & Plan  -Troponin 25>> 19.  Likely nonischemic myocardial injury in the setting of sepsis.    -EKG without ST changes.  Patient denies chest pain.    Hx of AKA (above knee amputation), left ([facility])- (present on admission)  Assessment & Plan  -History of left AKA 01/2024.    Seizures ([facility])- (present on admission)  Assessment & Plan  - Continue home gabapentin    Normal anion gap metabolic acidosis- (present on admission)  Assessment & Plan  -Bicarb 17 anion gap 9  -Daily BMP    Mild intermittent asthma without complication- (present on admission)  Assessment & Plan  -No sign of exacerbation  -Continue home albuterol as needed    Chronic pain syndrome- (present on admission)  Assessment & Plan  - Continue home oxycodone and gabapentin    GERD (gastroesophageal reflux disease)- (present on admission)  Assessment & Plan  - Continue home PPI    Normocytic anemia- (present on admission)  Assessment & Plan  -Hemoglobin 7.8, around baseline ranges from 7-10.  Likely in the setting of cirrhosis.  No sign of acute bleed.  -Daily CBC    Asymptomatic bacteriuria- (present on admission)  Assessment & Plan  -Patient denies LUTS.  UA with trace ketones, large occult blood, negative nitrite, moderate leukocyte esterase, 21-50 WBC, 21-50 RBC, many bacteria.  - Although low suspicion for UTI, patient is empirically on ceftriaxone.    Dyslipidemia- (present on admission)  Assessment & Plan  - Continue home  atorvastatin    Hypertension- (present on admission)  Assessment & Plan  - Hold all antihypertensives and diuretics in the setting of sepsis    Thrombocytopenia (HCC)- (present on admission)  Assessment & Plan  -PLT 86, around baseline ranging from 40s to 100s.  Likely in the setting of cirrhosis.  No sign of acute bleed.  -Daily CBC         VTE prophylaxis:    heparin ppx      I have performed a physical exam and reviewed and updated ROS and Plan today (5/7/2025). In review of yesterday's note (5/6/2025), there are no changes except as documented above.

## 2025-05-07 NOTE — ED NOTES
Report given to Wendi MONTAÑO. Aware of hypotension. Last 250 mL LR infusing as a bolus. VS being monitored q15min. On cardiac monitor and RA.

## 2025-05-07 NOTE — H&P
Hospital Medicine History & Physical Note    Date of Service  5/7/2025    Primary Care Physician  Anum Gatica M.D.    Consultants  none    Specialist Names: N/A    Code Status  Full Code    Chief Complaint  Chief Complaint   Patient presents with    Flu Like Symptoms     Pt BIBA from [facility] for flu like symptoms. Pt has had fever, cough and chills all day. Pt has hx of afib, arrives rate 110-170s. Pt has left AKA. Pt reporting some Cp with palpitations.     Palpitations    Fever       History of Presenting Illness  Patient is a 65-year-old female with past medical history of alcoholic cirrhosis, migraine, HTN, paroxysmal atrial fibrillation not on anticoagulation, GERD, chronic pain, NIDDM 2, seizure disorder, asthma, status post left AKA that presents with 1 day history of malaise, dry cough, fever, chills.     Patient states that they have been having a dry cough, full body soreness, fever, chills since yesterday.  Denies any lower urinary tract symptoms.  States that they would always have abdominal pain which is unchanged.     In the ED, BP 90s to 130s/50s to 90s, HR 60s to 150s, RR 11-21, Tmax 100.8 F, saturating well on room air.  Blood cultures x 2 drawn in the ED.  Received LR 1 L bolus x 1, diltiazem 18.6 mg IV x 1.  WBC 6.2, hemoglobin 7.8, PLT 86, bicarb 17, anion gap 9, BUN 45, creatinine 1.35, alk phos 211, lactic acid 1.6, troponin 25>> 19, NT proBNP 894.  COVID/flu/RSV negative.  UA with trace ketones, large occult blood, negative nitrite, moderate leukocyte esterase, 21-50 WBC, 21-50 RBC, many bacteria.  Chest x-ray with trace bilateral pleural effusions.  CT chest abdomen pelvis with contrast showing cirrhosis with recannulization of the umbilical vein and prominent anterior abdominal wall varices compatible with portal hypertension, splenomegaly, moderate scattered abdominal ascites, small bilateral pleural effusions left greater than right, prominent main pulmonary artery suggesting  pulmonary arterial hypertension.  EKG atrial fibrillation , QTc 484, with diffuse T wave flattening, similar to previous.    I discussed the plan of care with patient.    Review of Systems  Review of Systems   Constitutional:  Positive for chills, fever and malaise/fatigue.   HENT:  Negative for ear pain.    Eyes:  Negative for blurred vision, double vision and pain.   Respiratory:  Positive for cough. Negative for sputum production, shortness of breath and wheezing.    Cardiovascular:  Negative for chest pain, palpitations and leg swelling.   Gastrointestinal:  Positive for nausea and vomiting. Negative for abdominal pain, blood in stool, constipation, diarrhea and melena.   Genitourinary:  Negative for dysuria, frequency and hematuria.   Musculoskeletal:  Negative for back pain, joint pain and neck pain.   Neurological:  Negative for dizziness and headaches.       Past Medical History   has a past medical history of Abnormal finding of lung (12/27/2022), Acute exacerbation of chronic obstructive pulmonary disease (COPD) ([facility]) (01/27/2020), Alcoholic cirrhosis ([facility]), Arthritis, ASTHMA, Atrial fibrillation ([facility]), Dry eyes (11/16/2017), Edentulous, Emphysema of lung ([facility]), H/O: hysterectomy (12/05/2019), Heart burn, Hepatic encephalopathy ([facility]) (12/27/2022), Hepatitis C infection (07/28/2022), History of rheumatic fever (09/04/2017), Hypertension, 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[facility]), Medication overuse headache (08/15/2017), Mitral regurgitation, Pancytopenia ([facility]) (07/26/2022), Pneumonia (02/2020), Primary osteoarthritis of left knee (08/25/2020), Prosthetic joint infection ([facility]) (2018), Protein-calorie malnutrition, severe ([facility]) (08/01/2022), Right leg DVT ([facility]) (2018), Seizure disorder ([facility]), Septic arthritis of knee, left ([facility]) (07/19/2022), Septic arthritis of shoulder, left ([facility]) (07/17/2022), Septic shock due to Pseudomonas species ([facility]) (10/30/2023), and Type 2 diabetes mellitus ([facility])  (12/27/2022).    Surgical History   has a past surgical history that includes gyn surgery (1982); gyn surgery (2003); colonoscopy with clipping (10/28/2015); colonoscopy with sclerotherapy (10/28/2015); colonoscopy with tattooing (10/28/2015); irrigation & debridement ortho (Right, 9/3/2017); knee arthroplasty total (Right, 2018); pr total knee arthroplasty (Left, 8/24/2020); wound irrigation & debridement (Left, 7/17/2022); pr revise knee joint replace,all parts (Left, 7/19/2022); pr revise knee joint replace,all parts (Left, 12/28/2022); wound irrigation & debridement (Left, 12/28/2022); pr total knee arthroplasty (Left, 10/30/2023); knee amputation above (Left, 1/16/2024); and pr upper gi endoscopy,diagnosis (N/A, 11/27/2024).     Family History  family history includes Alcohol abuse in her brother; Dementia in her brother; Diabetes in her brother, brother, and mother; Heart Attack (age of onset: 45) in her father; Seizures in her father.   Family history reviewed with patient. There is no family history that is pertinent to the chief complaint.     Social History   reports that she quit smoking about 8 years ago. Her smoking use included cigarettes. She quit smokeless tobacco use about 4 years ago. She reports that she does not currently use alcohol. She reports that she does not currently use drugs.    Allergies  Allergies   Allergen Reactions    Meropenem Swelling     Pt had rxt to meropenem June 2024 (marked tongue swelling and right facial/periorbital edema)    Tuberculin Tests Unspecified     Per MAR from [facility]       Medications  Prior to Admission Medications   Prescriptions Last Dose Informant Patient Reported? Taking?   Dextrose, Diabetic Use, (GLUCOSE) 77.4 % Gel Unknown MAR from Other Facility Yes No   Sig: Take 1 Tube by mouth one time as needed (hypoglycemia). For BS < 60   Glucagon, rDNA, (GLUCAGON EMERGENCY) 1 MG Kit Unknown MAR from Other Facility Yes No   Sig: Inject 1 mg as directed one  time as needed (hypoglycemia).   albuterol 108 (90 Base) MCG/ACT Aero Soln inhalation aerosol Unknown MAR from Other Facility Yes No   Sig: Inhale 2 Puffs every four hours as needed for Shortness of Breath.   atorvastatin (LIPITOR) 20 MG Tab 5/6/2025 Evening MAR from Other Facility No Yes   Sig: Take 1 Tablet by mouth every evening.   bisacodyl (DULCOLAX) 10 MG Suppos Unknown MAR from Other Facility Yes No   Sig: Insert 10 mg into the rectum as needed.   furosemide (LASIX) 40 MG Tab 5/6/2025 at  8:00 AM MAR from Other Facility No Yes   Sig: Take 1 Tablet by mouth every day.   gabapentin (NEURONTIN) 100 MG Cap 5/6/2025 at  8:00 PM MAR from Other Facility No Yes   Sig: Take 2 Capsules by mouth 2 times a day.   lactulose 20 GM/30ML Solution 5/6/2025 at  8:00 PM MAR from Other Facility No Yes   Sig: Take 30 mL by mouth 4 times a day.   magnesium hydroxide (MILK OF MAGNESIA) 400 MG/5ML Suspension Unknown MAR from Other Facility Yes No   Sig: Take 30 mL by mouth 1 time a day as needed.   metoprolol SR (TOPROL XL) 50 MG TABLET SR 24 HR 5/6/2025 Morning MAR from Other Facility Yes Yes   Sig: Take 50 mg by mouth every day.   oxyCODONE immediate-release (ROXICODONE) 5 MG Tab 5/6/2025 at  6:34 PM MAR from Other Facility Yes Yes   Sig: Take 5 mg by mouth every 6 hours as needed for Severe Pain. X 30 days   pantoprazole (PROTONIX) 20 MG tablet 5/6/2025 Morning MAR from Other Facility Yes Yes   Sig: Take 20 mg by mouth every morning.   riFAXIMin (XIFAXAN) 550 MG Tab tablet 5/6/2025 at  8:00 PM MAR from Other Facility No Yes   Sig: Take 1 Tablet by mouth 2 times a day.   sodium phosphate 7-19 GM/118ML Enema Unknown MAR from Other Facility Yes No   Sig: Insert 1 Enema into the rectum as needed.      Facility-Administered Medications: None       Physical Exam  Temp:  [37.2 °C (99 °F)-38.2 °C (100.8 °F)] 37.2 °C (99 °F)  Pulse:  [] 78  Resp:  [11-21] 12  BP: ()/(54-97) 97/54  SpO2:  [96 %-99 %] 96 %  Blood Pressure :  97/54   Temperature: 37.2 °C (99 °F)   Pulse: 78   Respiration: 12   Pulse Oximetry: 96 %       Physical Exam  Vitals reviewed.   Constitutional:       General: She is not in acute distress.     Appearance: Normal appearance. She is not ill-appearing, toxic-appearing or diaphoretic.      Comments: AO x 4   HENT:      Head: Normocephalic and atraumatic.      Mouth/Throat:      Mouth: Mucous membranes are moist.      Pharynx: No oropharyngeal exudate or posterior oropharyngeal erythema.   Eyes:      General: No scleral icterus.     Extraocular Movements: Extraocular movements intact.      Conjunctiva/sclera: Conjunctivae normal.   Cardiovascular:      Rate and Rhythm: Normal rate and regular rhythm.      Heart sounds: Normal heart sounds. No murmur heard.     No friction rub. No gallop.   Pulmonary:      Effort: Pulmonary effort is normal. No respiratory distress.      Breath sounds: Normal breath sounds. No stridor. No wheezing, rhonchi or rales.   Abdominal:      General: Abdomen is flat. There is no distension.      Palpations: Abdomen is soft. There is no mass.      Tenderness: There is abdominal tenderness (mild diffuse). There is no guarding or rebound.      Hernia: No hernia is present.   Musculoskeletal:         General: Deformity (L AKA) present. No swelling or tenderness. Normal range of motion.      Cervical back: Neck supple. No rigidity.      Right lower leg: No edema.      Left lower leg: No edema.      Comments: Right lower extremity changes indicative of previous debridement   Lymphadenopathy:      Cervical: No cervical adenopathy.   Skin:     General: Skin is warm and dry.      Coloration: Skin is not jaundiced.   Neurological:      Mental Status: She is alert and oriented to person, place, and time. Mental status is at baseline.      Cranial Nerves: No cranial nerve deficit.         Laboratory:  Recent Labs     05/06/25 2116   WBC 6.2   RBC 2.78*   HEMOGLOBIN 7.8*   HEMATOCRIT 25.1*   MCV 90.3   MCH  28.1   MCHC 31.1*   RDW 52.4*   PLATELETCT 86*   MPV 10.6     Recent Labs     05/06/25 2116   SODIUM 136   POTASSIUM 4.5   CHLORIDE 110   CO2 17*   GLUCOSE 110*   BUN 45*   CREATININE 1.35   CALCIUM 8.2*     Recent Labs     05/06/25 2116   ALTSGPT 16   ASTSGOT 28   ALKPHOSPHAT 211*   TBILIRUBIN 0.8   GLUCOSE 110*         Recent Labs     05/06/25 2116   NTPROBNP 894*         Recent Labs     05/06/25 2116 05/07/25  0013   TROPONINT 25* 19       Imaging:  CT-CHEST,ABDOMEN,PELVIS WITH   Final Result         1.  Cirrhosis with recanalization of the umbilical vein and prominent anterior abdominal wall varices, compatible with portal hypertension.   2.  Splenomegaly.   3.  Moderate scattered abdominal ascites.   4.  Small bilateral pleural effusions, left greater than right.   5.  Linear densities the bilateral lung bases favor changes of atelectasis.   6.  Cardiomegaly.   7.  Prominent main pulmonary artery, appearance suggests component of pulmonary arterial hypertension.   8.  Atherosclerosis and atherosclerotic coronary artery disease.      DX-CHEST-LIMITED (1 VIEW)   Final Result         1.  No acute cardiopulmonary disease.   2.  Trace bilateral pleural effusions   3.  Cardiomegaly.   4.  Atherosclerosis.      IR-CONSULT AND TREAT    (Results Pending)       X-Ray:  I have personally reviewed the images and compared with prior images.  EKG:  I have personally reviewed the images and compared with prior images.    Assessment/Plan:  Justification for Admission Status  I anticipate this patient will require at least two midnights for appropriate medical management, necessitating inpatient admission because sepsis due to unknown source, SBP versus viral infection    Patient will need a Telemetry bed on MEDICAL service .  The need is secondary to sepsis due to unknown source, SBP versus viral infection.    * Sepsis (HCC)- (present on admission)  Assessment & Plan  This is Sepsis Present on admission  SIRS criteria  identified on my evaluation include: Fever, with temperature greater than 100.9 deg F, Tachycardia, with heart rate greater than 90 BPM, and Tachypnea, with respirations greater than 20 per minute  Clinical indicators of end organ dysfunction include Hypotension with systolic blood pressure less than 90 or MAP less than 65 and Hypotension with decrease in systolic blood pressure of more than 40mmHg  Source is intra-abdominal versus respiratory  Sepsis protocol initiated  Crystalloid Fluid Administration: Fluid resuscitation ordered per standard protocol - 30 mL/kg per current or ideal body weight  IV antibiotics as appropriate for source of sepsis  Reassessment: I have reassessed the patient's hemodynamic status    Possibly SBP given unknown source.  UA does have bacteria, but WBC 21-50 and patient denies lower urinary tract symptoms.  Given flulike symptoms also possible that this is viral infection.    - Follow blood and urine cultures  - Order full respiratory panel  - Hold off on antibiotics pending IR paracentesis; ED physician not able to find sufficient pocket for paracentesis  - IR consult for paracentesis    Atrial fibrillation with RVR (HCC)- (present on admission)  Assessment & Plan  A-fib with RVR up to the 150s.  Likely in the setting of sepsis. EKG atrial fibrillation , QTc 484, with diffuse T wave flattening, similar to previous.    - Received diltiazem 18.6 mg IV x 1 in the ED  - Hold off on any other AV bibi blockade at this time in the setting of sepsis with hypotension    ELIZABETH (acute kidney injury) (HCC)- (present on admission)  Assessment & Plan  Creatinine 1.35, baseline 0.7-0.9.  Likely hypoperfusion in the setting of sepsis complicated by atrial fibrillation with RVR.    - Daily BMP  - Avoid nephrotoxic medications  - Renally dose medications    Elevated troponin- (present on admission)  Assessment & Plan  Troponin 25>> 19.  Likely nonischemic myocardial injury in the setting of sepsis.   EKG without ST changes.  Patient denies chest pain.    Asymptomatic bacteriuria- (present on admission)  Assessment & Plan  Patient denies LUTS.  UA with trace ketones, large occult blood, negative nitrite, moderate leukocyte esterase, 21-50 WBC, 21-50 RBC, many bacteria.    - Hold off on antibiotics at this time    Alcoholic cirrhosis (HCC)- (present on admission)  Assessment & Plan  Possible that patient has SBP given sepsis with unknown source at this time.  CT showing cirrhosis with evidence of portal hypertension, moderate scattered abdominal ascites.    -Hold home furosemide, lactulose, rifaximin in the setting of sepsis with hypotension and ELIZABETH  -IR consult for paracentesis; ED physician not able to find sufficient pocket for paracentesis  -Follow blood and urine cultures    DM2 (diabetes mellitus, type 2) (HCC)- (present on admission)  Assessment & Plan  - Hypoglycemia protocol  - Diabetic diet  - SSI    Hx of AKA (above knee amputation), left (HCC)- (present on admission)  Assessment & Plan  History of left AKA 01/2024.    Seizures (HCC)- (present on admission)  Assessment & Plan  - Continue home gabapentin    Normal anion gap metabolic acidosis- (present on admission)  Assessment & Plan  Bicarb 17 anion gap 9    - Daily BMP    Mild intermittent asthma without complication- (present on admission)  Assessment & Plan  No sign of exacerbation    - Continue home albuterol as needed    Chronic pain syndrome- (present on admission)  Assessment & Plan  - Continue home oxycodone and gabapentin    GERD (gastroesophageal reflux disease)- (present on admission)  Assessment & Plan  - Continue home PPI    Normocytic anemia- (present on admission)  Assessment & Plan  Hemoglobin 7.8, around baseline ranges from 7-10.  Likely in the setting of cirrhosis.  No sign of acute bleed.    - Daily CBC    Dyslipidemia- (present on admission)  Assessment & Plan  - Continue home atorvastatin    Hypertension- (present on  admission)  Assessment & Plan  - Hold all antihypertensives and diuretics in the setting of sepsis    Thrombocytopenia (HCC)- (present on admission)  Assessment & Plan  PLT 86, around baseline ranging from 40s to 100s.  Likely in the setting of cirrhosis.  No sign of acute bleed.    - Daily CBC        VTE prophylaxis: SCDs/TEDs and heparin ppx

## 2025-05-07 NOTE — ED PROVIDER NOTES
ED Provider Note    CHIEF COMPLAINT  Chief Complaint   Patient presents with    Flu Like Symptoms     Pt BIBA from [facility] for flu like symptoms. Pt has had fever, cough and chills all day. Pt has hx of afib, arrives rate 110-170s. Pt has left AKA. Pt reporting some Cp with palpitations.     Palpitations    Fever     LIMITATION TO HISTORY   Patient is limited historian    HPI    April Alicia is a 65 y.o. female who presents to the Emergency Department for evaluation of flu like symptoms onset this morning. The patient reports symptoms of fever, chills, and cough. She notes chest pain with palpitations, confirming a history of A-fib and arriving with an irregular heart rate of 110-170. No alleviating or exacerbating factors noted.    OUTSIDE HISTORIAN(S):  None    EXTERNAL RECORDS REVIEWED  Reviewed office visit from 3/3/25 which reports that the patient has a history of hepatic encephalopathy, COPD, HTN, mitral valve regurgitation, Type 2 diabetes, chronic alcoholism, and Hepatitis C.    PAST MEDICAL HISTORY  Past Medical History:   Diagnosis Date    Abnormal finding of lung 12/27/2022    Acute exacerbation of chronic obstructive pulmonary disease (COPD) (HCC) 01/27/2020    Alcoholic cirrhosis (HCC)     Arthritis     OA in knees    ASTHMA     Atrial fibrillation (HCC)     Dry eyes 11/16/2017    Edentulous     upper and lower dentures    Emphysema of lung (HCC)     H/O: hysterectomy 12/05/2019    Heart burn     left knee    Hepatic encephalopathy (HCC) 12/27/2022    Hepatitis C infection 07/28/2022    History of rheumatic fever 09/04/2017    Hypertension     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HCC)     Recurrent, L knee, x4 with surgical washout    Medication overuse headache 08/15/2017    Mitral regurgitation     Pancytopenia (HCC) 07/26/2022    Pneumonia 02/2020    Primary osteoarthritis of left knee 08/25/2020    Prosthetic joint infection (HCC) 2018    Right knee after total knee     Protein-calorie malnutrition, severe (HCC) 08/01/2022    Right leg DVT ([facility]) 2018    acute, resolved    Seizure disorder ([facility])     Septic arthritis of knee, left ([facility]) 07/19/2022    Septic arthritis of shoulder, left ([facility]) 07/17/2022    Septic shock due to Pseudomonas species ([facility]) 10/30/2023    Type 2 diabetes mellitus ([facility]) 12/27/2022     SURGICAL HISTORY  Past Surgical History:   Procedure Laterality Date    ID UPPER GI ENDOSCOPY,DIAGNOSIS N/A 11/27/2024    Procedure: GASTROSCOPY;  Surgeon: Jose Guadalupe Velázquez M.D.;  Location: SURGERY SAME DAY [facility];  Service: Gastroenterology    KNEE AMPUTATION ABOVE Left 1/16/2024    Procedure: LEFT ABOVE KNEE AMPUTATION;  Surgeon: Obdulio Gray M.D.;  Location: [facility];  Service: Orthopedics    PB TOTAL KNEE ARTHROPLASTY Left 10/30/2023    Procedure: LEFT TOTAL KNEE ARTHROPLASTY EXPLANTATION, INSERTION OF ANTIBIOTIC SPACER, AND APPLICATION OF INCISIONAL WOUND VACUUM;  Surgeon: Wild Luz M.D.;  Location: [facility];  Service: Orthopedics    PB REVISE KNEE JOINT REPLACE,ALL PARTS Left 12/28/2022    Procedure: REVISION, TOTAL ARTHROPLASTY, KNEE, ALL COMPONENTS-LEFT;  Surgeon: Wild Luz M.D.;  Location: [facility];  Service: Orthopedics    WOUND IRRIGATION & DEBRIDEMENT Left 12/28/2022    Procedure: IRRIGATION AND DEBRIDEMENT, WOUND;  Surgeon: Wild Luz M.D.;  Location: [facility];  Service: Orthopedics    PB REVISE KNEE JOINT REPLACE,ALL PARTS Left 7/19/2022    Procedure: REVISION, TOTAL ARTHROPLASTY, KNEE, ALL COMPONENTS - ANTIBIOTIC SPACER;  Surgeon: Wild Luz M.D.;  Location: [facility];  Service: Orthopedics    WOUND IRRIGATION & DEBRIDEMENT Left 7/17/2022    Procedure: IRRIGATION AND DEBRIDEMENT, SHOULDER;  Surgeon: Obdulio Gray M.D.;  Location: [facility];  Service: Orthopedics    PB TOTAL KNEE ARTHROPLASTY Left 8/24/2020    Procedure: ARTHROPLASTY, KNEE, TOTAL;  Surgeon: Víctor KIRBY  DENNIS Faustin;  Location: SURGERY [facility];  Service: Orthopedics    KNEE ARTHROPLASTY TOTAL Right 2018    IRRIGATION & DEBRIDEMENT ORTHO Right 9/3/2017    Procedure: IRRIGATION & DEBRIDEMENT ORTHO, POLY EXCHANGE;  Surgeon: Jerome Russell M.D.;  Location: SURGERY [facility];  Service: Orthopedics    COLONOSCOPY WITH CLIPPING  10/28/2015    Procedure: COLONOSCOPY WITH CLIPPING;  Surgeon: Ruben Colon M.D.;  Location: ENDOSCOPY [facility];  Service:     COLONOSCOPY WITH SCLEROTHERAPY  10/28/2015    Procedure: COLONOSCOPY WITH SCLEROTHERAPY;  Surgeon: Ruben Colon M.D.;  Location: ENDOSCOPY [facility];  Service:     COLONOSCOPY WITH TATTOOING  10/28/2015    Procedure: COLONOSCOPY WITH TATTOOING;  Surgeon: Ruben Colon M.D.;  Location: ENDOSCOPY [facility];  Service:     GYN SURGERY      hysteroscoopy    GYN SURGERY      tubal ligation     FAMILY HISTORY  Family History   Problem Relation Age of Onset    Diabetes Mother     Seizures Father     Heart Attack Father 45    Diabetes Brother     Alcohol abuse Brother     Dementia Brother     Diabetes Brother       SOCIAL HISTORY  Social History     Socioeconomic History    Marital status: Single     Spouse name: Not on file    Number of children: 2    Years of education: Not on file    Highest education level: 11th grade   Occupational History    Occupation: CNA   Tobacco Use    Smoking status: Former     Current packs/day: 0.00     Types: Cigarettes     Quit date: 2016     Years since quittin.3    Smokeless tobacco: Former     Quit date: 2021    Tobacco comments:     a few a day when I can   Vaping Use    Vaping status: Never Used   Substance and Sexual Activity    Alcohol use: Not Currently     Comment: hx of AUD    Drug use: Not Currently    Sexual activity: Not Currently     Partners: Male     Birth control/protection: Post-Menopausal     Comment: Has boyfriend, not currently sexually active   Other Topics  Concern    Not on file   Social History Narrative    Not on file     Social Drivers of Health     Financial Resource Strain: Low Risk  (1/28/2020)    Overall Financial Resource Strain (CARDIA)     Difficulty of Paying Living Expenses: Not hard at all   Food Insecurity: No Food Insecurity (1/29/2025)    Hunger Vital Sign     Worried About Running Out of Food in the Last Year: Never true     Ran Out of Food in the Last Year: Never true   Transportation Needs: No Transportation Needs (1/29/2025)    PRAPARE - Transportation     Lack of Transportation (Medical): No     Lack of Transportation (Non-Medical): No   Physical Activity: Not on file   Stress: Not on file   Social Connections: Not on file   Intimate Partner Violence: Not At Risk (1/29/2025)    Humiliation, Afraid, Rape, and Kick questionnaire     Fear of Current or Ex-Partner: No     Emotionally Abused: No     Physically Abused: No     Sexually Abused: No   Housing Stability: Low Risk  (1/29/2025)    Housing Stability Vital Sign     Unable to Pay for Housing in the Last Year: No     Number of Times Moved in the Last Year: 1     Homeless in the Last Year: No     CURRENT MEDICATIONS  Current Outpatient Medications on File Prior to Encounter   Medication Sig Dispense Refill    gabapentin (NEURONTIN) 100 MG Cap Take 2 Capsules by mouth 2 times a day.      lactulose 20 GM/30ML Solution Take 30 mL by mouth 4 times a day.      nystatin (MYCOSTATIN) powder Apply 1 g topically 2 times a day.      riFAXIMin (XIFAXAN) 550 MG Tab tablet Take 1 Tablet by mouth 2 times a day.      insulin NPH (HUMULIN N) 100 UNIT/ML Suspension Inject 1-8 Units under the skin 4 Times a Day,Before Meals and at Bedtime. If -200=1 unit  201-250=3 units  251-300=5 units  301-350=7 units  351-400=8 units      metoprolol tartrate (LOPRESSOR) 25 MG Tab Take 12.5 mg by mouth every 8 hours as needed. If HR >120 as long as SBP>100      carvedilol (COREG) 6.25 MG Tab Take 6.25 mg by mouth 2 times a  "day with meals.        ipratropium-albuterol (DUONEB) 0.5-2.5 (3) MG/3ML nebulizer solution Take 3 mL by nebulization every four hours as needed for Shortness of Breath.      Dextrose, Diabetic Use, (GLUCOSE) 77.4 % Gel Take 1 Tube by mouth see administration instructions. For BS < 60      spironolactone (ALDACTONE) 50 MG Tab Take 50 mg by mouth every day.      furosemide (LASIX) 40 MG Tab Take 1 Tablet by mouth every day.      magnesium oxide (MAG-OX) 400 MG Tab tablet Take 400 mg by mouth every morning.      ascorbic acid (ASCORBIC ACID) 500 MG Tab Take 500 mg by mouth every morning.      ferrous sulfate 325 (65 Fe) MG tablet Take 325 mg by mouth at bedtime.      pantoprazole (PROTONIX) 20 MG tablet Take 20 mg by mouth every morning.      albuterol 108 (90 Base) MCG/ACT Aero Soln inhalation aerosol Inhale 2 Puffs every four hours as needed for Shortness of Breath.      atorvastatin (LIPITOR) 20 MG Tab Take 1 Tablet by mouth every evening. 90 Tablet 3     ALLERGIES  Allergies   Allergen Reactions    Meropenem Swelling     Pt had rxt to meropenem June 2024 (marked tongue swelling and right facial/periorbital edema)    Tuberculin Tests Unspecified     Per MAR from [facility]     PHYSICAL EXAM  VITAL SIGNS:/78   Pulse (!) 147   Temp (!) 38.2 °C (100.8 °F) (Oral)   Resp (!) 21   Ht 1.702 m (5' 7\")   Wt 74.4 kg (164 lb)   LMP 06/02/2008   SpO2 97%   BMI 25.69 kg/m²       GENERAL: Awake and alert  HEAD: Normocephalic and atraumatic  NECK: Normal range of motion, without meningismus  EYES: Pupils Equal, Round, Reactive to Light, extraocular movements intact, conjunctiva white  ENT: Mucous membranes moist, oropharynx clear  PULMONARY: Normal effort, clear to auscultation  CARDIOVASCULAR: No murmurs, clicks or rubs, peripheral pulses 2+, Irregular tachycardia.  ABDOMINAL: Soft, non-tender, no guarding or rigidity present, no pulsatile masses  BACK: no midline tenderness, no costovertebral " tenderness  NEUROLOGICAL: Grossly non-focal neurological examination, speech normal, gait normal  EXTREMITIES: No edema, Left AKA  SKIN: Warm to touch and dry.  PSYCHIATRIC: Affect is appropriate    DIAGNOSTIC STUDIES / PROCEDURES  EKG  I have independently interpreted this EKG  Results for orders placed or performed during the hospital encounter of 25   EKG   Result Value Ref Range    Report       [facility] Emergency Dept.    Test Date:  2025  Pt Name:    FRANCISCA TURNER          Department: ER  MRN:        [id number]                      Room:        09  Gender:     Female                       Technician: [id number]  :        1960                   Requested By:ER TRIAGE PROTOCOL  Order #:    [id number]                    Reading MD:    Measurements  Intervals                                Axis  Rate:       137                          P:          0  RI:         0                            QRS:        48  QRSD:       77                           T:          162  QT:         320  QTc:        484    Interpretive Statements  Atrial fibrillation  Ventricular premature complex  Low voltage, extremity leads  Nonspecific T abnormalities, lateral leads  Compared to ECG 01/15/2025 15:23:14  Ventricular premature complex(es) now present  Low QRS voltage now present  Prolonged QT interval no longer present  T-wave abnormality still present       LABS  Labs Reviewed   PROBRAIN NATRIURETIC PEPTIDE, NT - Abnormal; Notable for the following components:       Result Value    NT-proBNP 894 (*)     All other components within normal limits   TROPONIN - Abnormal; Notable for the following components:    Troponin T 25 (*)     All other components within normal limits   CBC WITH DIFFERENTIAL - Abnormal; Notable for the following components:    RBC 2.78 (*)     Hemoglobin 7.8 (*)     Hematocrit 25.1 (*)     MCHC 31.1 (*)     RDW 52.4 (*)     Platelet Count 86 (*)     Neutrophils-Polys 81.60 (*)      Lymphocytes 9.70 (*)     Lymphs (Absolute) 0.60 (*)     All other components within normal limits   COMP METABOLIC PANEL - Abnormal; Notable for the following components:    Co2 17 (*)     Glucose 110 (*)     Bun 45 (*)     Calcium 8.2 (*)     Alkaline Phosphatase 211 (*)     Albumin 2.9 (*)     Globulin 4.3 (*)     All other components within normal limits   ESTIMATED GFR - Abnormal; Notable for the following components:    GFR (CKD-EPI) 44 (*)     All other components within normal limits   LACTIC ACID   BLOOD CULTURE   BLOOD CULTURE   TROPONIN   IMMATURE PLT FRACTION   URINALYSIS   URINE CULTURE(NEW)   POCT COV-2, FLU A/B, RSV BY PCR   POC COV-2, FLU A/B, RSV BY PCR     All labs reviewed by me.     RADIOLOGY  I have independently interpreted the diagnostic imaging associated with this visit and am waiting the final reading from the radiologist.   My preliminary interpretation is as follows: No SBO    Formal Radiologist interpretation:  CT-CHEST,ABDOMEN,PELVIS WITH   Final Result         1.  Cirrhosis with recanalization of the umbilical vein and prominent anterior abdominal wall varices, compatible with portal hypertension.   2.  Splenomegaly.   3.  Moderate scattered abdominal ascites.   4.  Small bilateral pleural effusions, left greater than right.   5.  Linear densities the bilateral lung bases favor changes of atelectasis.   6.  Cardiomegaly.   7.  Prominent main pulmonary artery, appearance suggests component of pulmonary arterial hypertension.   8.  Atherosclerosis and atherosclerotic coronary artery disease.      DX-CHEST-LIMITED (1 VIEW)   Final Result         1.  No acute cardiopulmonary disease.   2.  Trace bilateral pleural effusions   3.  Cardiomegaly.   4.  Atherosclerosis.        COURSE & MEDICAL DECISION MAKING    ED COURSE:    INTERVENTIONS BY ME:  Medications   Bolus of LR (0 mL Intravenous Stopped 5/6/25 6756)   iohexol (OMNIPAQUE) 350 mg/mL (IV) (100 mL Intravenous Given 5/7/25 0100)    dilTIAZem (Cardizem) injection 18.6 mg (18.6 mg Intravenous Given 5/7/25 0120)       Response on recheck:    9:10 PM - Patient seen and examined at bedside. Patient presents to the ED for evaluation of flu like symptoms onset this morning, having an irregular rhythm in the 140's on initial visit.  After my exam, I discussed with the patient the plan of care, which includes treating the patient with medication and fluids for their symptoms, as well as obtaining lab work and imaging for further evaluation. Patient understands and verbalizes agreement to plan of care. Patient will be treated with LR 1 L infusion. Ordered DX-Chest-Limited, CoV-2 Flu A/B and RSV PCR, Lactic Acid, UA, Urine Culture, Blood Culture, NT-proBNP, Troponin, CBC w/ diff., and CMP to evaluate. If patient remains tachycardic, will place her on diltiazem.    10:00 PM - Patient was reevaluated at bedside. Vital signs were improved at this time.    1:05 AM - Patient was reevaluated at bedside and noted to have a heart rate in the 130's and there was no source for sepsis. Will treat A-fib with diltiazem.    1:39 AM - Patient was reevaluated at bedside. She is hypotensive with a MAP of 63, but is conversational and alert. Will treat with additional liter of fluids.      3:40 AM patient with normal vital signs at this time MAP is 70, ultrasound bedside performed to evaluate for diagnostic and therapeutic paracentesis however she is has no pocket of fluid that is large enough for me to feel comfortable to do a paracentesis      I disccused the mangament and decision to admit this patient with the Hospitalist.     INITIAL ASSESSMENT, COURSE AND PLAN  Care Narrative:     Hydration: Based on the patient's presentation of Tachycardia the patient was given IV fluids. IV Hydration was used because oral hydration was not adequate alone. Upon recheck following hydration, the patient was still tachycardic.  Hydration: Based on the patient's presentation  of Hypotension the patient was given IV fluids. IV Hydration was used because oral hydration was not adequate alone. Upon recheck following hydration, the patient was improved.  This is a 65-year-old female with extensive comorbidities including cirrhosis, hepatic encephalopathy, atrial fibrillation, COPD, diabetes, and a history of multiple septic joint infections, presenting from a skilled nursing facility with fever, cough, chills, chest pain, and palpitations. On arrival, she was tachycardic with irregular rhythm consistent with A-fib with RVR, febrile, and later became hypotensive with MAP in the low 60s. Her exam was notable for irregular tachycardia and clinical signs of systemic illness. Labs revealed anemia, thrombocytopenia, elevated inflammatory markers, metabolic acidosis, low albumin, and signs of decompensated liver disease. Imaging showed cirrhosis with portal hypertension, ascites, and pleural effusions, but no definitive source of infection.    Given her hemodynamic instability, high-risk comorbidities, and no clear infectious source, my differential included sepsis of unclear origin, pneumonia, spontaneous bacterial peritonitis, or influenza-like viral illness with cardiac strain. Elevated NT-proBNP, troponin, and tachyarrhythmia raised concern for cardiac involvement or decompensated heart failure. Her atrial fibrillation was rate-controlled with diltiazem, and she required IV fluids for hypotension.    The patient was closely monitored with serial exams and treatment adjustments, and although her MAP improved, her overall condition remained tenuous. Due to the potential for rapid decompensation in the setting of sepsis, cirrhosis, and cardiac arrhythmia, critical care time was appropriately initiated and documented. She was admitted for continued management and further diagnostics under inpatient care.  ADDITIONAL PROBLEM LIST      DISPOSITION AND DISCUSSIONS  Discussion of management with other  [facility] or appropriate source(s):     I have discussed management of the patient with the following physicians and CANDIDO's:          Barriers to care at this time, including but not limited to:  No known barriers to care .     Decision tools and prescription drugs considered including, but not limited to:       FINAL DIAGNOSIS  1. Sepsis, due to unspecified organism, unspecified whether acute organ dysfunction present (HCC)    2. Flu-like symptoms    3. Atrial fibrillation with rapid ventricular response (HCC)      CRITICAL CARE  The very real possibilty of a deterioration of this patient's condition required the highest level of my preparedness for sudden, emergent intervention.  I provided critical care services, which included medication orders, frequent reevaluations of the patient's condition and response to treatment, ordering and reviewing test results, and discussing the case with various consultants.  The critical care time associated with the care of the patient was 38 minutes. Review chart for interventions. This time is exclusive of any other billable procedures.     Brice ARGUETA (Scribe), am scribing for, and in the presence of, Rome Azar.    Electronically signed by: Brice Corbett (Lisandro), 5/6/2025    IRome personally performed the services described in this documentation, as scribed by Brice Corbett in my presence, and it is both accurate and complete.     Electronically signed by: Rome Azar DO ,3:53 AM 05/06/25

## 2025-05-07 NOTE — ED NOTES
Pt had large, loose bowel movement. Pt cleaned and provided with new linens/brief. Pt placed on purewick.

## 2025-05-07 NOTE — PROGRESS NOTES
4 Eyes Skin Assessment Completed by DANYEL Ayers and DANYEL Dias.    Head WDL  Ears WDL  Nose WDL  Mouth WDL  Neck WDL  Breast/Chest WDL  Shoulder Blades WDL  Spine WDL  (R) Arm/Elbow/Hand Bruising  (L) Arm/Elbow/Hand Bruising  Abdomen WDL  Groin Blanching and Excoriation  Scrotum/Coccyx/Buttocks Redness, Blanching, and Discoloration, no open areas  (R) Leg Scar and Swelling, discoloration, skin graft  (L) Leg WDL healed AKA site  (R) Heel/Foot/Toe Redness, Blanching, Discoloration, and Swelling  (L) Heel/Foot/Toe NA          Devices In Places Tele Box, Blood Pressure Cuff, and Pulse Ox      Interventions In Place Pillows, Q2 Turns, Barrier Cream, ZFlo Pillow, Dri-Shalom Pads, and Pressure Redistribution Mattress    Possible Skin Injury No    Pictures Uploaded Into Epic N/A  Wound Consult Placed N/A  RN Wound Prevention Protocol Ordered Yes

## 2025-05-07 NOTE — ED TRIAGE NOTES
Chief Complaint   Patient presents with    Flu Like Symptoms     Pt BIBA from [facility] for flu like symptoms. Pt has had fever, cough and chills all day. Pt has hx of afib, arrives rate 110-170s. Pt has left AKA. Pt reporting some Cp with palpitations.     Palpitations    Fever

## 2025-05-07 NOTE — ASSESSMENT & PLAN NOTE
MELD 15  -CT showing cirrhosis with evidence of portal hypertension, moderate scattered abdominal ascites.  Quit drinking 10 years ago.  - Restart furosemide, lactulose and rifaximin  - start on coreg for portal hypertension

## 2025-05-07 NOTE — ASSESSMENT & PLAN NOTE
-A-fib with RVR up to the 150s on admission.  Likely in the setting of sepsis. EKG atrial fibrillation , QTc 484, with diffuse T wave flattening, similar to previous.  -Received diltiazem 18.6 mg IV x 1 in the ED  -Currently rate controlled.  Monitor on telemetry.

## 2025-05-07 NOTE — ASSESSMENT & PLAN NOTE
-PLT 86, around baseline ranging from 40s to 100s.  Likely in the setting of cirrhosis.  No sign of acute bleed.  -Daily CBC

## 2025-05-07 NOTE — ASSESSMENT & PLAN NOTE
ELIZABETH on CKD stage III in the setting of sepsis and cirrhosis   -Daily BMP  -Renally dose medications

## 2025-05-07 NOTE — ED NOTES
Pt resting with even and unlabored respirations. Bed locked in lowest position, call light within reach.   Paracentesis supplies placed at bedside.

## 2025-05-07 NOTE — ASSESSMENT & PLAN NOTE
-Hemoglobin 7.8, around baseline ranges from 7-10.  Likely in the setting of cirrhosis.  No sign of acute bleed.  -Daily CBC

## 2025-05-07 NOTE — ED NOTES
Unable to obtain med rec at this time. Patient is from [facility]. No MAR in chart. Requested for MAR.

## 2025-05-07 NOTE — ASSESSMENT & PLAN NOTE
-Troponin 25>> 19.  Likely nonischemic myocardial injury in the setting of sepsis.    -EKG without ST changes.  Patient denies chest pain.

## 2025-05-07 NOTE — PROGRESS NOTES
Patient brought to the unit on [facility] with ACLS RN. Patient AO x4, very [place]. Denies pain on arrival. Vitals stable on room air. Tele monitor placed. Oriented to the unit, bed alarm on, call light within reach.

## 2025-05-07 NOTE — ASSESSMENT & PLAN NOTE
-SIRS criteria identified on my evaluation include: Fever, with temperature greater than 100.9 deg F, Tachycardia, with heart rate greater than 90 BPM, and Tachypnea, with respirations greater than 20 per minute  -Clinical indicators of end organ dysfunction include Hypotension with systolic blood pressure less than 90 or MAP less than 65 and Hypotension with decrease in systolic blood pressure of more than 40mmHg  -Source is intra-abdominal versus respiratory  -Sepsis protocol initiated  -Crystalloid Fluid Administration: Fluid resuscitation ordered per standard protocol - 30 mL/kg per current or ideal body weight  -Start IV Zosyn  - Blood culture positive for Pseudomonas, pan sensitive. Ok for cipro on discharge per ID

## 2025-05-07 NOTE — ED NOTES
Med Rec complete per MAR from [facility] (MAR in chart)   Allergies reviewed  Antibiotics in the past 30 days:yes  Anticoagulant in past 14 days:no  Pharmacy patient utilizes:[facility]

## 2025-05-07 NOTE — ED NOTES
Pt resting with even and unlabored respirations. Bed locked in lowest position, call light within reach.

## 2025-05-07 NOTE — PROGRESS NOTES
Bedside report received from off going RN/. RN, assumed care of patient.  POC discussed with patient, all needs addressed at this time.         Fall risk interventions in place: Patient's personal possessions are with in their safe reach, Call light within reach, gurney in lowest position. Pt educated to call if getting gurney.   Continuous monitoring: Cardiac Leads, Pulse Ox, or Blood Pressure  IVF/IV medications: Infusion per MAR (List Med(s))   Oxygen: Room air.   Bedside sitter: Not Applicable   Isolation: Not Applicable

## 2025-05-08 PROBLEM — B96.5 BACTEREMIA DUE TO PSEUDOMONAS: Status: ACTIVE | Noted: 2024-05-18

## 2025-05-08 LAB
ALBUMIN SERPL BCP-MCNC: 2.3 G/DL (ref 3.2–4.9)
ALBUMIN/GLOB SERPL: 0.6 G/DL
ALP SERPL-CCNC: 182 U/L (ref 30–99)
ALT SERPL-CCNC: 11 U/L (ref 2–50)
ANION GAP SERPL CALC-SCNC: 9 MMOL/L (ref 7–16)
AST SERPL-CCNC: 20 U/L (ref 12–45)
BILIRUB SERPL-MCNC: 0.7 MG/DL (ref 0.1–1.5)
BUN SERPL-MCNC: 44 MG/DL (ref 8–22)
CALCIUM ALBUM COR SERPL-MCNC: 8.9 MG/DL (ref 8.5–10.5)
CALCIUM SERPL-MCNC: 7.5 MG/DL (ref 8.5–10.5)
CHLORIDE SERPL-SCNC: 111 MMOL/L (ref 96–112)
CO2 SERPL-SCNC: 15 MMOL/L (ref 20–33)
CREAT SERPL-MCNC: 1.46 MG/DL (ref 0.5–1.4)
ERYTHROCYTE [DISTWIDTH] IN BLOOD BY AUTOMATED COUNT: 52.8 FL (ref 35.9–50)
FOLATE SERPL-MCNC: 8.6 NG/ML
GFR SERPLBLD CREATININE-BSD FMLA CKD-EPI: 40 ML/MIN/1.73 M 2
GLOBULIN SER CALC-MCNC: 3.7 G/DL (ref 1.9–3.5)
GLUCOSE BLD STRIP.AUTO-MCNC: 137 MG/DL (ref 65–99)
GLUCOSE BLD STRIP.AUTO-MCNC: 142 MG/DL (ref 65–99)
GLUCOSE BLD STRIP.AUTO-MCNC: 145 MG/DL (ref 65–99)
GLUCOSE BLD STRIP.AUTO-MCNC: 153 MG/DL (ref 65–99)
GLUCOSE SERPL-MCNC: 148 MG/DL (ref 65–99)
HCT VFR BLD AUTO: 22.3 % (ref 37–47)
HGB BLD-MCNC: 7.3 G/DL (ref 12–16)
IRON SATN MFR SERPL: 6 % (ref 15–55)
IRON SERPL-MCNC: 9 UG/DL (ref 40–170)
MCH RBC QN AUTO: 29.8 PG (ref 27–33)
MCHC RBC AUTO-ENTMCNC: 32.7 G/DL (ref 32.2–35.5)
MCV RBC AUTO: 91 FL (ref 81.4–97.8)
PLATELET # BLD AUTO: 70 K/UL (ref 164–446)
PLATELETS.RETICULATED NFR BLD AUTO: 1.3 % (ref 0.6–13.1)
PMV BLD AUTO: 11.9 FL (ref 9–12.9)
POTASSIUM SERPL-SCNC: 4.7 MMOL/L (ref 3.6–5.5)
PROT SERPL-MCNC: 6 G/DL (ref 6–8.2)
RBC # BLD AUTO: 2.45 M/UL (ref 4.2–5.4)
SODIUM SERPL-SCNC: 135 MMOL/L (ref 135–145)
TIBC SERPL-MCNC: 162 UG/DL (ref 250–450)
TSH SERPL DL<=0.005 MIU/L-ACNC: 2.38 UIU/ML (ref 0.38–5.33)
UIBC SERPL-MCNC: 153 UG/DL (ref 110–370)
VIT B12 SERPL-MCNC: 1005 PG/ML (ref 211–911)
WBC # BLD AUTO: 11.5 K/UL (ref 4.8–10.8)

## 2025-05-08 PROCEDURE — 700102 HCHG RX REV CODE 250 W/ 637 OVERRIDE(OP): Performed by: STUDENT IN AN ORGANIZED HEALTH CARE EDUCATION/TRAINING PROGRAM

## 2025-05-08 PROCEDURE — 97163 PT EVAL HIGH COMPLEX 45 MIN: CPT

## 2025-05-08 PROCEDURE — 99233 SBSQ HOSP IP/OBS HIGH 50: CPT | Performed by: INTERNAL MEDICINE

## 2025-05-08 PROCEDURE — 82607 VITAMIN B-12: CPT

## 2025-05-08 PROCEDURE — 83540 ASSAY OF IRON: CPT

## 2025-05-08 PROCEDURE — 36415 COLL VENOUS BLD VENIPUNCTURE: CPT

## 2025-05-08 PROCEDURE — 700111 HCHG RX REV CODE 636 W/ 250 OVERRIDE (IP): Performed by: INTERNAL MEDICINE

## 2025-05-08 PROCEDURE — 84443 ASSAY THYROID STIM HORMONE: CPT

## 2025-05-08 PROCEDURE — 83550 IRON BINDING TEST: CPT

## 2025-05-08 PROCEDURE — 770020 HCHG ROOM/CARE - TELE (206)

## 2025-05-08 PROCEDURE — A9270 NON-COVERED ITEM OR SERVICE: HCPCS | Performed by: INTERNAL MEDICINE

## 2025-05-08 PROCEDURE — 80053 COMPREHEN METABOLIC PANEL: CPT

## 2025-05-08 PROCEDURE — A9270 NON-COVERED ITEM OR SERVICE: HCPCS | Performed by: STUDENT IN AN ORGANIZED HEALTH CARE EDUCATION/TRAINING PROGRAM

## 2025-05-08 PROCEDURE — 82962 GLUCOSE BLOOD TEST: CPT | Mod: 91

## 2025-05-08 PROCEDURE — 97535 SELF CARE MNGMENT TRAINING: CPT

## 2025-05-08 PROCEDURE — 82746 ASSAY OF FOLIC ACID SERUM: CPT

## 2025-05-08 PROCEDURE — 85027 COMPLETE CBC AUTOMATED: CPT

## 2025-05-08 PROCEDURE — 700105 HCHG RX REV CODE 258: Performed by: INTERNAL MEDICINE

## 2025-05-08 PROCEDURE — 700102 HCHG RX REV CODE 250 W/ 637 OVERRIDE(OP): Performed by: INTERNAL MEDICINE

## 2025-05-08 PROCEDURE — 85055 RETICULATED PLATELET ASSAY: CPT

## 2025-05-08 RX ORDER — HEPARIN SODIUM 5000 [USP'U]/ML
5000 INJECTION, SOLUTION INTRAVENOUS; SUBCUTANEOUS EVERY 12 HOURS
Status: DISCONTINUED | OUTPATIENT
Start: 2025-05-08 | End: 2025-05-09

## 2025-05-08 RX ORDER — SODIUM CHLORIDE 9 MG/ML
INJECTION, SOLUTION INTRAVENOUS CONTINUOUS
Status: DISCONTINUED | OUTPATIENT
Start: 2025-05-08 | End: 2025-05-12

## 2025-05-08 RX ADMIN — OMEPRAZOLE 20 MG: 20 CAPSULE, DELAYED RELEASE ORAL at 05:19

## 2025-05-08 RX ADMIN — ACETAMINOPHEN 650 MG: 325 TABLET ORAL at 22:06

## 2025-05-08 RX ADMIN — LACTULOSE 30 ML: 10 SOLUTION ORAL at 16:53

## 2025-05-08 RX ADMIN — GABAPENTIN 200 MG: 100 CAPSULE ORAL at 16:53

## 2025-05-08 RX ADMIN — PIPERACILLIN AND TAZOBACTAM 4.5 G: 4; .5 INJECTION, POWDER, FOR SOLUTION INTRAVENOUS at 11:39

## 2025-05-08 RX ADMIN — OXYCODONE 5 MG: 5 TABLET ORAL at 16:53

## 2025-05-08 RX ADMIN — RIFAXIMIN 550 MG: 550 TABLET ORAL at 16:52

## 2025-05-08 RX ADMIN — OXYCODONE 5 MG: 5 TABLET ORAL at 02:27

## 2025-05-08 RX ADMIN — PIPERACILLIN AND TAZOBACTAM 4.5 G: 4; .5 INJECTION, POWDER, FOR SOLUTION INTRAVENOUS at 05:18

## 2025-05-08 RX ADMIN — GABAPENTIN 200 MG: 100 CAPSULE ORAL at 05:19

## 2025-05-08 RX ADMIN — OXYCODONE 5 MG: 5 TABLET ORAL at 08:52

## 2025-05-08 RX ADMIN — INSULIN LISPRO 1 UNITS: 100 INJECTION, SOLUTION INTRAVENOUS; SUBCUTANEOUS at 11:43

## 2025-05-08 RX ADMIN — ATORVASTATIN CALCIUM 20 MG: 20 TABLET, FILM COATED ORAL at 22:06

## 2025-05-08 RX ADMIN — HEPARIN SODIUM 5000 UNITS: 5000 INJECTION, SOLUTION INTRAVENOUS; SUBCUTANEOUS at 16:54

## 2025-05-08 RX ADMIN — LACTULOSE 30 ML: 10 SOLUTION ORAL at 22:06

## 2025-05-08 RX ADMIN — PIPERACILLIN AND TAZOBACTAM 4.5 G: 4; .5 INJECTION, POWDER, FOR SOLUTION INTRAVENOUS at 22:15

## 2025-05-08 ASSESSMENT — ENCOUNTER SYMPTOMS
EYES NEGATIVE: 1
FEVER: 0
SHORTNESS OF BREATH: 1
WEAKNESS: 1
CHILLS: 1
COUGH: 1
NAUSEA: 1
VOMITING: 1
ABDOMINAL PAIN: 1
DIZZINESS: 1
MYALGIAS: 1
PSYCHIATRIC NEGATIVE: 1

## 2025-05-08 ASSESSMENT — PAIN DESCRIPTION - PAIN TYPE
TYPE: CHRONIC PAIN
TYPE: ACUTE PAIN
TYPE: ACUTE PAIN;CHRONIC PAIN
TYPE: CHRONIC PAIN

## 2025-05-08 ASSESSMENT — FIBROSIS 4 INDEX: FIB4 SCORE: 5.6

## 2025-05-08 ASSESSMENT — COGNITIVE AND FUNCTIONAL STATUS - GENERAL
MOVING TO AND FROM BED TO CHAIR: A LOT
TURNING FROM BACK TO SIDE WHILE IN FLAT BAD: A LITTLE
STANDING UP FROM CHAIR USING ARMS: A LOT
SUGGESTED CMS G CODE MODIFIER MOBILITY: CL
CLIMB 3 TO 5 STEPS WITH RAILING: TOTAL
MOBILITY SCORE: 12
WALKING IN HOSPITAL ROOM: TOTAL
MOVING FROM LYING ON BACK TO SITTING ON SIDE OF FLAT BED: A LITTLE

## 2025-05-08 ASSESSMENT — GAIT ASSESSMENTS: GAIT LEVEL OF ASSIST: UNABLE TO PARTICIPATE

## 2025-05-08 NOTE — DISCHARGE PLANNING
Case Management Discharge Planning    Admission Date: 5/6/2025  GMLOS: 3.5  ALOS: 1    6-Clicks ADL Score: 17  6-Clicks Mobility Score: 12  PT and/or OT Eval ordered: Yes  Post-acute Referrals Ordered: Yes  Post-acute Choice Obtained: Yes  Has referral(s) been sent to post-acute provider:  Yes      Anticipated Discharge Dispo: Discharge Disposition: D/T to SNF with Medicare cert in anticipation of skilled care (03)    DME Needed: No    Action(s) Taken: Updated Provider/Nurse on Discharge Plan    Escalations Completed: None    Medically Clear: No    Next Steps: Pt is agreeable to return to [place] when medically cleared.    Barriers to Discharge: Medical clearance    Is the patient up for discharge tomorrow: No

## 2025-05-08 NOTE — CARE PLAN
The patient is Stable - Low risk of patient condition declining or worsening    Shift Goals  Clinical Goals: skin integrity, paracentesis, IV Abx,  Patient Goals: rest  Family Goals: gisele    Progress made toward(s) clinical / shift goals:    Problem: Knowledge Deficit - Standard  Goal: Patient and family/care givers will demonstrate understanding of plan of care, disease process/condition, diagnostic tests and medications  Outcome: Progressing     Problem: Fluid Volume  Goal: Fluid volume balance will be maintained  Outcome: Progressing     Problem: Urinary - Renal Perfusion  Goal: Ability to achieve and maintain adequate renal perfusion and functioning will improve  Outcome: Progressing     Problem: Skin Integrity  Goal: Skin integrity is maintained or improved  Outcome: Progressing     Problem: Pain - Standard  Goal: Alleviation of pain or a reduction in pain to the patient’s comfort goal  Outcome: Progressing       Patient is not progressing towards the following goals:

## 2025-05-08 NOTE — PROGRESS NOTES
Report received from Andria MONTAÑO, assumed care of pt. Pt A&Ox4. Plan of care discussed with pt, labs and chart reviewed. All needs met at this time. Tele box on. On room air. Call light within reach, bed locked and in lowest position. All fall precautions and hourly rounding in place.

## 2025-05-08 NOTE — PROGRESS NOTES
Monitor summary: Afib 100-103, CO -, QRS 0.08, QT -, with episodes of tachycardia up to 170 per strip from monitor room.

## 2025-05-08 NOTE — CARE PLAN
The patient is Watcher - Medium risk of patient condition declining or worsening    Shift Goals  Clinical Goals: stable HR, complete paracentesis  Patient Goals: eat/drink  Family Goals: Updates    Progress made toward(s) clinical / shift goals:    Problem: Knowledge Deficit - Standard  Goal: Patient and family/care givers will demonstrate understanding of plan of care, disease process/condition, diagnostic tests and medications  Outcome: Progressing     Problem: Hemodynamics  Goal: Patient's hemodynamics, fluid balance and neurologic status will be stable or improve  Outcome: Progressing     Problem: Respiratory  Goal: Patient will achieve/maintain optimum respiratory ventilation and gas exchange  Outcome: Progressing       Patient is not progressing towards the following goals:

## 2025-05-08 NOTE — PROGRESS NOTES
Bedside report received and patient care assumed. Pt is sitting in bed and refuses getting into the chair at this time, A&O 4, with 8/10 pain in right leg and medicated per MAR, and is on RA. Tele box on. All fall precautions are in place, belongings on bedside table.  Pt was updated on POC, no questions or concerns. Pt educated on use of call light for assistance.

## 2025-05-08 NOTE — PROGRESS NOTES
[facility] Medicine Daily Progress Note    Date of Service  5/8/2025    Chief Complaint  April Alicia is a 65 y.o. female admitted 5/6/2025 with fevers, chills, cough.    Hospital Course  Patient is a 65-year-old female with past medical history of alcoholic cirrhosis, migraine, HTN, paroxysmal atrial fibrillation not on anticoagulation, GERD, chronic pain, NIDDM 2, seizure disorder, asthma, status post left AKA that presents with 1 day history of malaise, dry cough, fever, chills.     Patient states that they have been having a dry cough, full body soreness, fever, chills since yesterday.  Denies any lower urinary tract symptoms.  States that they would always have abdominal pain which is unchanged.     In the ED, BP 90s to 130s/50s to 90s, HR 60s to 150s, RR 11-21, Tmax 100.8 F, saturating well on room air.  Blood cultures x 2 drawn in the ED.  Received LR 1 L bolus x 1, diltiazem 18.6 mg IV x 1.  WBC 6.2, hemoglobin 7.8, PLT 86, bicarb 17, anion gap 9, BUN 45, creatinine 1.35, alk phos 211, lactic acid 1.6, troponin 25>> 19, NT proBNP 894.  COVID/flu/RSV negative.  UA with trace ketones, large occult blood, negative nitrite, moderate leukocyte esterase, 21-50 WBC, 21-50 RBC, many bacteria.  Chest x-ray with trace bilateral pleural effusions.  CT chest abdomen pelvis with contrast showing cirrhosis with recannulization of the umbilical vein and prominent anterior abdominal wall varices compatible with portal hypertension, splenomegaly, moderate scattered abdominal ascites, small bilateral pleural effusions left greater than right, prominent main pulmonary artery suggesting pulmonary arterial hypertension.  EKG atrial fibrillation , QTc 484, with diffuse T wave flattening, similar to previous.    Interval Problem Update  5/7 patient reports symptoms of subjective fevers, chills, nausea, vomiting, abdominal pain, shallow breathing. Reports feeling very tired and fatigued and unable to ambulate.  She  denies any dysuria.  She is noted to be anemic with a hemoglobin of 7.8 and thrombocytopenic with a platelet count of 86.  Monitor CBC and transfuse for hemoglobin less than 7 or platelets less than 10 or if she develops active bleeding.  Ultrasound-guided paracentesis is pending.  Continue empiric IV ceftriaxone and Flagyl    5/8 Patient as blood cultures positive for Pseudomonas.  Switch antibiotics to IV Zosyn.  Likely source is intra-abdominal.  She does report some lower abdominal pain.  CT chest, abdomen and pelvis revealed evidence of cirrhosis and portal hypertension with scattered abdominal ascites and small bilateral pleural effusions.  Will consult ID.  Ultrasound reveals small volume ascites and no safe access window for paracentesis.    I have discussed this patient's plan of care and discharge plan at IDT rounds today with Case Management, Nursing, Nursing leadership, and other members of the IDT team.    Consultants/Specialty  None    Code Status  Full Code    Disposition  The patient is not medically cleared for discharge to home or a post-acute facility.      I have placed the appropriate orders for post-discharge needs.    Review of Systems  Review of Systems   Constitutional:  Positive for chills and malaise/fatigue. Negative for fever.   HENT: Negative.     Eyes: Negative.    Respiratory:  Positive for cough and shortness of breath.    Cardiovascular:  Negative for chest pain.   Gastrointestinal:  Positive for abdominal pain, melena, nausea and vomiting.   Genitourinary:  Negative for dysuria.   Musculoskeletal:  Positive for myalgias.   Skin: Negative.    Neurological:  Positive for dizziness and weakness (Generalized).   Endo/Heme/Allergies: Negative.    Psychiatric/Behavioral: Negative.          Physical Exam  Temp:  [36.1 °C (97 °F)-37.6 °C (99.7 °F)] 36.1 °C (97 °F)  Pulse:  [] 78  Resp:  [15-18] 18  BP: ()/(52-90) 102/58  SpO2:  [95 %-99 %] 95 %    Physical Exam  Vitals and  nursing note reviewed.   Constitutional:       Appearance: She is obese. She is ill-appearing.   HENT:      Head: Normocephalic and atraumatic.      Nose: Nose normal.      Mouth/Throat:      Mouth: Mucous membranes are dry.   Eyes:      Pupils: Pupils are equal, round, and reactive to light.   Cardiovascular:      Rate and Rhythm: Normal rate. Rhythm irregular.      Pulses: Normal pulses.      Heart sounds: Normal heart sounds.   Pulmonary:      Effort: Pulmonary effort is normal.      Breath sounds: Rhonchi present.   Abdominal:      General: There is distension.      Tenderness: There is abdominal tenderness.   Musculoskeletal:         General: Normal range of motion.      Right lower leg: Edema present.      Left lower leg: Edema present.   Skin:     General: Skin is warm.      Capillary Refill: Capillary refill takes less than 2 seconds.   Neurological:      General: No focal deficit present.      Mental Status: She is alert.   Psychiatric:         Behavior: Behavior normal.         Fluids    Intake/Output Summary (Last 24 hours) at 5/8/2025 1557  Last data filed at 5/8/2025 0852  Gross per 24 hour   Intake 1240 ml   Output 500 ml   Net 740 ml        Laboratory  Recent Labs     05/06/25 2116 05/07/25 1441 05/08/25  0239   WBC 6.2 8.7 11.5*   RBC 2.78* 2.80* 2.45*   HEMOGLOBIN 7.8* 7.9* 7.3*   HEMATOCRIT 25.1* 25.4* 22.3*   MCV 90.3 90.7 91.0   MCH 28.1 28.2 29.8   MCHC 31.1* 31.1* 32.7   RDW 52.4* 53.6* 52.8*   PLATELETCT 86* 72* 70*   MPV 10.6 12.8 11.9     Recent Labs     05/06/25 2116 05/07/25  1441 05/08/25  0239   SODIUM 136 136 135   POTASSIUM 4.5 4.6 4.7   CHLORIDE 110 111 111   CO2 17* 16* 15*   GLUCOSE 110* 86 148*   BUN 45* 42* 44*   CREATININE 1.35 1.20 1.46*   CALCIUM 8.2* 7.9* 7.5*     Recent Labs     05/06/25 2116   INR 1.43*               Imaging  US-ABDOMEN LTD (SOFT TISSUE)   Final Result         1. Small volume ascites. No safe access window for paracentesis at this time.          CT-CHEST,ABDOMEN,PELVIS WITH   Final Result         1.  Cirrhosis with recanalization of the umbilical vein and prominent anterior abdominal wall varices, compatible with portal hypertension.   2.  Splenomegaly.   3.  Moderate scattered abdominal ascites.   4.  Small bilateral pleural effusions, left greater than right.   5.  Linear densities the bilateral lung bases favor changes of atelectasis.   6.  Cardiomegaly.   7.  Prominent main pulmonary artery, appearance suggests component of pulmonary arterial hypertension.   8.  Atherosclerosis and atherosclerotic coronary artery disease.      DX-CHEST-LIMITED (1 VIEW)   Final Result         1.  No acute cardiopulmonary disease.   2.  Trace bilateral pleural effusions   3.  Cardiomegaly.   4.  Atherosclerosis.           Assessment/Plan  * Sepsis (HCC)- (present on admission)  Assessment & Plan  -SIRS criteria identified on my evaluation include: Fever, with temperature greater than 100.9 deg F, Tachycardia, with heart rate greater than 90 BPM, and Tachypnea, with respirations greater than 20 per minute  -Clinical indicators of end organ dysfunction include Hypotension with systolic blood pressure less than 90 or MAP less than 65 and Hypotension with decrease in systolic blood pressure of more than 40mmHg  -Source is intra-abdominal versus respiratory  -Sepsis protocol initiated  -Crystalloid Fluid Administration: Fluid resuscitation ordered per standard protocol - 30 mL/kg per current or ideal body weight  -Start IV Zosyn  - Blood culture positive for Pseudomonas, follow sensitivity report  -Order full respiratory panel      DM2 (diabetes mellitus, type 2) (Former[facility])- (present on admission)  Assessment & Plan  - Hypoglycemia protocol  - Diabetic diet  - SSI    Elevated troponin- (present on admission)  Assessment & Plan  -Troponin 25>> 19.  Likely nonischemic myocardial injury in the setting of sepsis.    -EKG without ST changes.  Patient denies chest pain.    Hx of AKA  (above knee amputation), left (HCC)- (present on admission)  Assessment & Plan  -History of left AKA 01/2024.    Seizures (HCC)- (present on admission)  Assessment & Plan  - Continue home gabapentin    Atrial fibrillation with RVR (HCC)- (present on admission)  Assessment & Plan  -A-fib with RVR up to the 150s on admission.  Likely in the setting of sepsis. EKG atrial fibrillation , QTc 484, with diffuse T wave flattening, similar to previous.  -Received diltiazem 18.6 mg IV x 1 in the ED  -Hold off on any other AV bibi blockade at this time in the setting of sepsis with hypotension  -Currently rate controlled.  Monitor on telemetry.    ELIZABETH (acute kidney injury) (HCC)- (present on admission)  Assessment & Plan  -Creatinine 1.35, baseline 0.7-0.9.  Likely hypoperfusion in the setting of sepsis complicated by atrial fibrillation with RVR.  -Daily BMP  -Avoid nephrotoxic medications  -Renally dose medications    Normal anion gap metabolic acidosis- (present on admission)  Assessment & Plan  -Bicarb 17 anion gap 9  -Daily BMP    Bacteremia due to Pseudomonas- (present on admission)  Assessment & Plan  Started on IV Zosyn 4.5 g  Will consult ID  Likely source is intra-abdominal  Unable to do paracentesis due to small volume ascites  Repeat blood cultures    Mild intermittent asthma without complication- (present on admission)  Assessment & Plan  -No sign of exacerbation  -Continue home albuterol as needed    Chronic pain syndrome- (present on admission)  Assessment & Plan  - Continue home oxycodone and gabapentin    GERD (gastroesophageal reflux disease)- (present on admission)  Assessment & Plan  - Continue home PPI    Normocytic anemia- (present on admission)  Assessment & Plan  -Hemoglobin 7.8, around baseline ranges from 7-10.  Likely in the setting of cirrhosis.  No sign of acute bleed.  -Daily CBC    Asymptomatic bacteriuria- (present on admission)  Assessment & Plan  -Patient denies LUTS.  UA with trace  ketones, large occult blood, negative nitrite, moderate leukocyte esterase, 21-50 WBC, 21-50 RBC, many bacteria.  - Although low suspicion for UTI, patient is on IV Zosyn for pseudomonas bacteremia     Dyslipidemia- (present on admission)  Assessment & Plan  - Continue home atorvastatin    Hypertension- (present on admission)  Assessment & Plan  - Hold all antihypertensives and diuretics in the setting of sepsis    Thrombocytopenia (HCC)- (present on admission)  Assessment & Plan  -PLT 86, around baseline ranging from 40s to 100s.  Likely in the setting of cirrhosis.  No sign of acute bleed.  -Daily CBC    Alcoholic cirrhosis (HCC)- (present on admission)  Assessment & Plan  -Possible that patient has SBP given sepsis with unknown source at this time.    -CT showing cirrhosis with evidence of portal hypertension, moderate scattered abdominal ascites.  Quit drinking 10 years ago.  - Restart furosemide, lactulose and rifaximin           VTE prophylaxis: heparin    I have performed a physical exam and reviewed and updated ROS and Plan today (5/8/2025). In review of yesterday's note (5/7/2025), there are no changes except as documented above.    I spent 52 minutes, reviewing the chart, obtaining and/or reviewing separately obtained history. Performing a medically appropriate examination and evaluation.  Counseling and educating the patient. Ordering and reviewing medications, tests, or procedures. Documenting clinical information in EPIC. Independently interpreting results and communicating results to patient. Discussing future disposition of care with patient, RN and case management.

## 2025-05-08 NOTE — PROGRESS NOTES
Monitor Summary    Rhythm; Atrial Fibrillation  bpm, up to 160    Ectopy: (R) PVCs (R) couplets    Measurements: - / 0.09 / -

## 2025-05-08 NOTE — THERAPY
Physical Therapy   Initial Evaluation     Patient Name: April Alicia  Age:  65 y.o., Sex:  female  Medical Record #: [id number]  Today's Date: 5/8/2025     Precautions  Precautions: (P) Fall Risk    Assessment  Patient is  65 y.o. female admitted 5/6/2025 with fevers, chills, cough. Pending US paracentesis, sepsis. PMH alcoholic cirrhosis, AFib, GERD ,chronic pn, NIDDM 2, seizure disorder,asthma, L AKA. T reports she has not ambulated x 2 weeks due to weakness and that PT had been working with her at long term facility. Standing , transfers are limited by weakness and pain to RLE.  Pt states she does not want to return to previous Facility and is looking for alternative facility . Will follow pt during acute care. See below flowsheet for eval findings.        Plan    Physical Therapy Initial Treatment Plan   Treatment Plan : (P) Bed Mobility, Neuro Re-Education / Balance, Self Care / Home Evaluation, Therapeutic Activities, Therapeutic Exercise  Treatment Frequency: (P) 3 Times per Week  Duration: (P) Until Therapy Goals Met    DC Equipment Recommendations: (P) Unable to determine at this time  Discharge Recommendations: (P) Recommend post-acute placement for additional physical therapy services prior to discharge home          05/08/25 0940   Time In/Time Out   Therapy Start Time 0940   Therapy End Time 1018   Total Therapy Time 38   Charge Group   PT Evaluation PT Evaluation High   PT Self Care / Home Evaluation (Units) 1   Total Time Spent   PT Evaluation Time Spent (Mins) 23   PT Self Care/Home Evaluation Time Spent (Mins) 15   PT Total Time Spent (Calculated) 38    Services   Is patient using  services for this encounter? No   Initial Contact Note    Initial Contact Note Order Received and Verified, Physical Therapy Evaluation in Progress with Full Report to Follow.   Precautions   Precautions Fall Risk   Vitals   O2 Delivery Device None - Room Air   Pain 0 - 10 Group   Location  Leg   Location Orientation Right   Description   (with standing)   Therapist Pain Assessment 8;Nurse Notified;Post Activity Pain Same as Prior to Activity   Prior Living Situation   Prior Services Continuous (24 Hour) Care Giving Per Service   Housing / Facility Long Term Care Facility   Lives with - Patient's Self Care Capacity Other (Comments)  ([facility])   Prior Level of Functional Mobility   Bed Mobility Required Assist   Transfer Status Required Assist   Ambulation Other (Comments)   Ambulation Distance   (unable x 2weeks)   Comments pt states she has not ambulated in 2 weeks, was working with PT to use prothesis, but had not gotten too far.   History of Falls   History of Falls   (none within last year)   Cognition    Cognition / Consciousness WDL   Level of Consciousness Alert   Comments pt is [place]   Passive ROM Lower Body   Passive ROM Lower Body X   Comments R ankle swollen impacting Passive and AROM   Active ROM Lower Body    Comments functional to EOB   Strength Lower Body   Lower Body Strength  X   Comments RLE weakness impacting standing tolerance   Sensation Lower Body   Lower Extremity Sensation   Not Tested   Lower Body Muscle Tone   Lower Body Muscle Tone  WDL   Neurological Concerns   Neurological Concerns No   Coordination Lower Body    Coordination Lower Body  WDL   Other Treatments   Other Treatments Provided postioning, review ther ex RLE. Review frequent turning while in bed to avoid skin breakdown   Balance Assessment   Sitting Balance (Static) Fair +   Sitting Balance (Dynamic) Fair   Standing Balance (Static) Fair -   Standing Balance (Dynamic) Poor +   Weight Shift Sitting Fair   Weight Shift Standing Absent   Bed Mobility    Supine to Sit Contact Guard Assist   Sit to Supine Contact Guard Assist   Scooting Standby Assist   Gait Analysis   Gait Level Of Assist Unable to Participate   Functional Mobility   Sit to Stand Contact Guard Assist   Comments weakness and RLE pain impacting  standing tolerence   6 Clicks Assessment - How much HELP from from another person do you currently need... (If the patient hasn't done an activity recently, how much help from another person do you think he/she would need if he/she tried?)   Turning from your back to your side while in a flat bed without using bedrails? 3   Moving from lying on your back to sitting on the side of a flat bed without using bedrails? 3   Moving to and from a bed to a chair (including a wheelchair)? 2   Standing up from a chair using your arms (e.g., wheelchair, or bedside chair)? 2   Walking in hospital room? 1   Climbing 3-5 steps with a railing? 1   6 clicks Mobility Score 12   Activity Tolerance   Sitting Edge of Bed 15 mins   Standing 20 secs   Edema / Skin Assessment   Edema / Skin  X   Comments R LE swelling   Short Term Goals    Short Term Goal # 1 in 6 V pt will perform sit to stand with SBA   Short Term Goal # 2 in 6 V pt will tolerate standing x 2 mins with FWW   Short Term Goal # 3 in 6 V pt will transfer bed to chair with CGA   Education Group   Education Provided Role of Physical Therapist   Role of Physical Therapist Patient Response Patient;Acceptance;Explanation;Verbal Demonstration   Physical Therapy Initial Treatment Plan    Treatment Plan  Bed Mobility;Neuro Re-Education / Balance;Self Care / Home Evaluation;Therapeutic Activities;Therapeutic Exercise   Treatment Frequency 3 Times per Week   Duration Until Therapy Goals Met   Problem List    Problems Impaired Transfers;Impaired Bed Mobility;Decreased Activity Tolerance;Functional Strength Deficit   Anticipated Discharge Equipment and Recommendations   DC Equipment Recommendations Unable to determine at this time   Discharge Recommendations Recommend post-acute placement for additional physical therapy services prior to discharge home   Interdisciplinary Plan of Care Collaboration   IDT Collaboration with  Nursing;Physician;Occupational Therapist   Patient Position at  End of Therapy Phone within Reach;Tray Table within Reach;Call Light within Reach;Bed Alarm On;In Bed   Collaboration Comments re; anayaal   Session Information   Date / Session Number  5/8/25-1 ( 1/3, 5/14)

## 2025-05-08 NOTE — CARE PLAN
The patient is Stable - Low risk of patient condition declining or worsening    Shift Goals  Clinical Goals: safety, pain management, education  Patient Goals: rest, pain control  Family Goals: Updates    Progress made toward(s) clinical / shift goals:      Problem: Knowledge Deficit - Standard  Goal: Patient and family/care givers will demonstrate understanding of plan of care, disease process/condition, diagnostic tests and medications  Outcome: Progressing   Pt educated on POC, all questions answered in regards to disease process, treatment and diet. Pt verbalizes understanding and voice no further questions at this time.     Problem: Hemodynamics  Goal: Patient's hemodynamics, fluid balance and neurologic status will be stable or improve  Outcome: Progressing   VSS at this time. HR and BP being closely monitored.     Problem: Respiratory  Goal: Patient will achieve/maintain optimum respiratory ventilation and gas exchange  Outcome: Progressing   Pt currently adequately saturating on room air.   Problem: Skin Integrity  Goal: Skin integrity is maintained or improved  Outcome: Progressing   Skin assessment complete, skin impairment documented, wound consult in place,  skin breakdown prevention measures in place.     Problem: Fall Risk  Goal: Patient will remain free from falls  Outcome: Progressing   Fall risk assessed, fall precautions in place, bed alarm on, pt verbalizes understanding of fall risk.     Problem: Pain - Standard  Goal: Alleviation of pain or a reduction in pain to the patient’s comfort goal  Outcome: Progressing   Pt receiving prn pain medication as ordered per MD. Pt also using distraction and positioning to decrease pain.

## 2025-05-08 NOTE — DISCHARGE PLANNING
In the case of an emergency, pt's legal NOK is daughter, Ava Laureano     RNCM met with pt at bedside and obtained the information used in this assessment. Pt verified accuracy of facesheet. Pt lives at [facility] in Long term care. Prior to current hospitalization, pt was dependent in ADLS/IADLS.Is w/c bound, able to dress and feed self.. Pt has a good support system. Pt denies any hx of substance use and denies any dx of mh.    Care Transition Team Assessment    Information Source:pt  Orientation Level: Oriented X4  Information Given By: Patient  Informant's Name: Karen  Who is responsible for making decisions for patient? : Patient         Elopement Risk  Legal Hold: No  Ambulatory or Self Mobile in Wheelchair: No-Not an Elopement Risk  Elopement Risk: Not at Risk for Elopement    Interdisciplinary Discharge Planning  Does Admitting Nurse Feel This Could be a Complex Discharge?: No  Primary Care Physician: Gavi  Lives with - Patient's Self Care Capacity: Unrelated Adult  Patient or legal guardian wants to designate a caregiver: No  Support Systems: Family Member(s)  Housing / Facility: [facility] ([facility])  Name of Care Facility: [facility]  Do You Take your Prescribed Medications Regularly: Yes  Mobility Issues: Yes  Prior Services: Continuous (24 Hour) Care Giving Per Service    Discharge Preparedness  What is your plan after discharge?: Skilled nursing facility  Prior Functional Level: Bladder Incontinence, Bowel Incontinence, Needs Assist with Activities of Daily Living, Needs Assist with Medication Management, Wheelchair Dependent  Difficulity with ADLs: Bathing, Toileting, Walking  Difficulity with IADLs: Cooking, Driving, Keeping track of finances, Laundry, Managing medication, Shopping    Functional Assesment  Prior Functional Level: Bladder Incontinence, Bowel Incontinence, Needs Assist with Activities of Daily Living, Needs Assist with Medication Management, Wheelchair  Dependent    Finances  Prescription Coverage: Yes    Vision / Hearing Impairment  Vision Impairment : Yes  Hearing Impairment : Yes  Hearing Impairment: Both Ears, Hearing Device(s) Available         Advance Directive  Advance Directive?: POLST    Domestic Abuse  Have you ever been the victim of abuse or violence?: No  Possible Abuse/Neglect Reported to:: Not Applicable    Psychological Assessment  History of Substance Abuse: None  History of Psychiatric Problems: No         Anticipated Discharge Information  Discharge Disposition: D/T to SNF with Medicare cert in anticipation of skilled care (03)

## 2025-05-08 NOTE — ASSESSMENT & PLAN NOTE
Continue IV Zosyn 4.5 g with end date of 5/14. Ok to switch to cipro on discharge   ID consulted  Likely source is intra-abdominal  Unable to do paracentesis due to small volume ascites

## 2025-05-09 LAB
ABO GROUP BLD: NORMAL
ALBUMIN SERPL BCP-MCNC: 2.2 G/DL (ref 3.2–4.9)
ALBUMIN/GLOB SERPL: 0.6 G/DL
ALP SERPL-CCNC: 129 U/L (ref 30–99)
ALT SERPL-CCNC: 7 U/L (ref 2–50)
ANION GAP SERPL CALC-SCNC: 6 MMOL/L (ref 7–16)
AST SERPL-CCNC: 15 U/L (ref 12–45)
BACTERIA BLD CULT: ABNORMAL
BACTERIA BLD CULT: ABNORMAL
BACTERIA UR CULT: ABNORMAL
BACTERIA UR CULT: ABNORMAL
BARCODED ABORH UBTYP: 5100
BARCODED PRD CODE UBPRD: NORMAL
BARCODED UNIT NUM UBUNT: NORMAL
BILIRUB SERPL-MCNC: 0.7 MG/DL (ref 0.1–1.5)
BLD GP AB SCN SERPL QL: NORMAL
BUN SERPL-MCNC: 49 MG/DL (ref 8–22)
C DIFF TOX GENS STL QL NAA+PROBE: NEGATIVE
CALCIUM ALBUM COR SERPL-MCNC: 8.9 MG/DL (ref 8.5–10.5)
CALCIUM SERPL-MCNC: 7.5 MG/DL (ref 8.5–10.5)
CHLORIDE SERPL-SCNC: 113 MMOL/L (ref 96–112)
CO2 SERPL-SCNC: 17 MMOL/L (ref 20–33)
COMPONENT R 8504R: NORMAL
CREAT SERPL-MCNC: 1.47 MG/DL (ref 0.5–1.4)
ERYTHROCYTE [DISTWIDTH] IN BLOOD BY AUTOMATED COUNT: 54.7 FL (ref 35.9–50)
GFR SERPLBLD CREATININE-BSD FMLA CKD-EPI: 39 ML/MIN/1.73 M 2
GLOBULIN SER CALC-MCNC: 3.5 G/DL (ref 1.9–3.5)
GLUCOSE BLD STRIP.AUTO-MCNC: 128 MG/DL (ref 65–99)
GLUCOSE BLD STRIP.AUTO-MCNC: 128 MG/DL (ref 65–99)
GLUCOSE BLD STRIP.AUTO-MCNC: 135 MG/DL (ref 65–99)
GLUCOSE BLD STRIP.AUTO-MCNC: 144 MG/DL (ref 65–99)
GLUCOSE SERPL-MCNC: 140 MG/DL (ref 65–99)
HCT VFR BLD AUTO: 21.7 % (ref 37–47)
HCT VFR BLD AUTO: 25.9 % (ref 37–47)
HGB BLD-MCNC: 6.7 G/DL (ref 12–16)
HGB BLD-MCNC: 8 G/DL (ref 12–16)
HGB BLD-MCNC: 8.4 G/DL (ref 12–16)
MCH RBC QN AUTO: 28.9 PG (ref 27–33)
MCHC RBC AUTO-ENTMCNC: 30.9 G/DL (ref 32.2–35.5)
MCV RBC AUTO: 93.5 FL (ref 81.4–97.8)
PLATELET # BLD AUTO: 60 K/UL (ref 164–446)
PLATELETS.RETICULATED NFR BLD AUTO: 0.8 % (ref 0.6–13.1)
PMV BLD AUTO: 12.3 FL (ref 9–12.9)
POTASSIUM SERPL-SCNC: 4.3 MMOL/L (ref 3.6–5.5)
PRODUCT TYPE UPROD: NORMAL
PROT SERPL-MCNC: 5.7 G/DL (ref 6–8.2)
RBC # BLD AUTO: 2.32 M/UL (ref 4.2–5.4)
RH BLD: NORMAL
SIGNIFICANT IND 70042: ABNORMAL
SIGNIFICANT IND 70042: ABNORMAL
SITE SITE: ABNORMAL
SITE SITE: ABNORMAL
SODIUM SERPL-SCNC: 136 MMOL/L (ref 135–145)
SOURCE SOURCE: ABNORMAL
SOURCE SOURCE: ABNORMAL
UNIT STATUS USTAT: NORMAL
WBC # BLD AUTO: 10.2 K/UL (ref 4.8–10.8)

## 2025-05-09 PROCEDURE — 700111 HCHG RX REV CODE 636 W/ 250 OVERRIDE (IP): Performed by: INTERNAL MEDICINE

## 2025-05-09 PROCEDURE — 99291 CRITICAL CARE FIRST HOUR: CPT | Performed by: INTERNAL MEDICINE

## 2025-05-09 PROCEDURE — A9270 NON-COVERED ITEM OR SERVICE: HCPCS | Performed by: STUDENT IN AN ORGANIZED HEALTH CARE EDUCATION/TRAINING PROGRAM

## 2025-05-09 PROCEDURE — 86901 BLOOD TYPING SEROLOGIC RH(D): CPT

## 2025-05-09 PROCEDURE — 82962 GLUCOSE BLOOD TEST: CPT | Mod: 91

## 2025-05-09 PROCEDURE — 86923 COMPATIBILITY TEST ELECTRIC: CPT

## 2025-05-09 PROCEDURE — 80053 COMPREHEN METABOLIC PANEL: CPT

## 2025-05-09 PROCEDURE — P9016 RBC LEUKOCYTES REDUCED: HCPCS

## 2025-05-09 PROCEDURE — 36430 TRANSFUSION BLD/BLD COMPNT: CPT

## 2025-05-09 PROCEDURE — 85018 HEMOGLOBIN: CPT | Mod: 91

## 2025-05-09 PROCEDURE — A9270 NON-COVERED ITEM OR SERVICE: HCPCS | Performed by: INTERNAL MEDICINE

## 2025-05-09 PROCEDURE — 36415 COLL VENOUS BLD VENIPUNCTURE: CPT

## 2025-05-09 PROCEDURE — 99223 1ST HOSP IP/OBS HIGH 75: CPT | Performed by: INTERNAL MEDICINE

## 2025-05-09 PROCEDURE — 87493 C DIFF AMPLIFIED PROBE: CPT

## 2025-05-09 PROCEDURE — 700102 HCHG RX REV CODE 250 W/ 637 OVERRIDE(OP)

## 2025-05-09 PROCEDURE — 86900 BLOOD TYPING SEROLOGIC ABO: CPT

## 2025-05-09 PROCEDURE — A9270 NON-COVERED ITEM OR SERVICE: HCPCS

## 2025-05-09 PROCEDURE — 30233N1 TRANSFUSION OF NONAUTOLOGOUS RED BLOOD CELLS INTO PERIPHERAL VEIN, PERCUTANEOUS APPROACH: ICD-10-PCS | Performed by: INTERNAL MEDICINE

## 2025-05-09 PROCEDURE — 700102 HCHG RX REV CODE 250 W/ 637 OVERRIDE(OP): Performed by: STUDENT IN AN ORGANIZED HEALTH CARE EDUCATION/TRAINING PROGRAM

## 2025-05-09 PROCEDURE — 770020 HCHG ROOM/CARE - TELE (206)

## 2025-05-09 PROCEDURE — 97167 OT EVAL HIGH COMPLEX 60 MIN: CPT

## 2025-05-09 PROCEDURE — 85027 COMPLETE CBC AUTOMATED: CPT

## 2025-05-09 PROCEDURE — 97535 SELF CARE MNGMENT TRAINING: CPT

## 2025-05-09 PROCEDURE — 700102 HCHG RX REV CODE 250 W/ 637 OVERRIDE(OP): Performed by: INTERNAL MEDICINE

## 2025-05-09 PROCEDURE — 85014 HEMATOCRIT: CPT

## 2025-05-09 PROCEDURE — 86850 RBC ANTIBODY SCREEN: CPT

## 2025-05-09 PROCEDURE — 85055 RETICULATED PLATELET ASSAY: CPT

## 2025-05-09 PROCEDURE — 700105 HCHG RX REV CODE 258: Performed by: INTERNAL MEDICINE

## 2025-05-09 RX ORDER — MIDODRINE HYDROCHLORIDE 5 MG/1
5 TABLET ORAL
Status: DISCONTINUED | OUTPATIENT
Start: 2025-05-09 | End: 2025-05-11

## 2025-05-09 RX ORDER — MIDODRINE HYDROCHLORIDE 5 MG/1
5 TABLET ORAL ONCE
Status: COMPLETED | OUTPATIENT
Start: 2025-05-09 | End: 2025-05-09

## 2025-05-09 RX ORDER — PANTOPRAZOLE SODIUM 40 MG/10ML
40 INJECTION, POWDER, LYOPHILIZED, FOR SOLUTION INTRAVENOUS 2 TIMES DAILY
Status: DISCONTINUED | OUTPATIENT
Start: 2025-05-09 | End: 2025-05-13 | Stop reason: HOSPADM

## 2025-05-09 RX ADMIN — PANTOPRAZOLE SODIUM 40 MG: 40 INJECTION, POWDER, FOR SOLUTION INTRAVENOUS at 17:27

## 2025-05-09 RX ADMIN — ATORVASTATIN CALCIUM 20 MG: 20 TABLET, FILM COATED ORAL at 21:27

## 2025-05-09 RX ADMIN — LACTULOSE 30 ML: 10 SOLUTION ORAL at 09:34

## 2025-05-09 RX ADMIN — OXYCODONE 5 MG: 5 TABLET ORAL at 11:34

## 2025-05-09 RX ADMIN — LACTULOSE 30 ML: 10 SOLUTION ORAL at 12:56

## 2025-05-09 RX ADMIN — OMEPRAZOLE 20 MG: 20 CAPSULE, DELAYED RELEASE ORAL at 05:45

## 2025-05-09 RX ADMIN — MIDODRINE HYDROCHLORIDE 5 MG: 5 TABLET ORAL at 04:03

## 2025-05-09 RX ADMIN — PIPERACILLIN AND TAZOBACTAM 4.5 G: 4; .5 INJECTION, POWDER, FOR SOLUTION INTRAVENOUS at 21:27

## 2025-05-09 RX ADMIN — OCTREOTIDE ACETATE 50 MCG/HR: 200 INJECTION, SOLUTION INTRAVENOUS; SUBCUTANEOUS at 17:21

## 2025-05-09 RX ADMIN — MIDODRINE HYDROCHLORIDE 5 MG: 5 TABLET ORAL at 11:32

## 2025-05-09 RX ADMIN — RIFAXIMIN 550 MG: 550 TABLET ORAL at 17:21

## 2025-05-09 RX ADMIN — OXYCODONE 5 MG: 5 TABLET ORAL at 17:55

## 2025-05-09 RX ADMIN — GABAPENTIN 200 MG: 100 CAPSULE ORAL at 05:45

## 2025-05-09 RX ADMIN — PIPERACILLIN AND TAZOBACTAM 4.5 G: 4; .5 INJECTION, POWDER, FOR SOLUTION INTRAVENOUS at 13:03

## 2025-05-09 RX ADMIN — MIDODRINE HYDROCHLORIDE 5 MG: 5 TABLET ORAL at 06:43

## 2025-05-09 RX ADMIN — METOPROLOL SUCCINATE 50 MG: 50 TABLET, EXTENDED RELEASE ORAL at 05:45

## 2025-05-09 RX ADMIN — OXYCODONE 5 MG: 5 TABLET ORAL at 02:56

## 2025-05-09 RX ADMIN — FUROSEMIDE 40 MG: 40 TABLET ORAL at 05:45

## 2025-05-09 RX ADMIN — PIPERACILLIN AND TAZOBACTAM 4.5 G: 4; .5 INJECTION, POWDER, FOR SOLUTION INTRAVENOUS at 05:51

## 2025-05-09 RX ADMIN — MIDODRINE HYDROCHLORIDE 5 MG: 5 TABLET ORAL at 17:21

## 2025-05-09 RX ADMIN — GABAPENTIN 200 MG: 100 CAPSULE ORAL at 17:21

## 2025-05-09 RX ADMIN — RIFAXIMIN 550 MG: 550 TABLET ORAL at 05:45

## 2025-05-09 RX ADMIN — HEPARIN SODIUM 5000 UNITS: 5000 INJECTION, SOLUTION INTRAVENOUS; SUBCUTANEOUS at 05:45

## 2025-05-09 ASSESSMENT — PAIN DESCRIPTION - PAIN TYPE
TYPE: CHRONIC PAIN
TYPE: ACUTE PAIN

## 2025-05-09 ASSESSMENT — ENCOUNTER SYMPTOMS
ABDOMINAL PAIN: 1
FEVER: 0
BLOOD IN STOOL: 1
MYALGIAS: 1
COUGH: 1
EYES NEGATIVE: 1
WEAKNESS: 1
DIZZINESS: 1
VOMITING: 1
CHILLS: 1
NAUSEA: 1
SHORTNESS OF BREATH: 1
PSYCHIATRIC NEGATIVE: 1

## 2025-05-09 ASSESSMENT — COGNITIVE AND FUNCTIONAL STATUS - GENERAL
DAILY ACTIVITIY SCORE: 14
TOILETING: A LOT
EATING MEALS: A LITTLE
DRESSING REGULAR LOWER BODY CLOTHING: A LOT
DRESSING REGULAR UPPER BODY CLOTHING: A LOT
PERSONAL GROOMING: A LITTLE
HELP NEEDED FOR BATHING: A LOT
SUGGESTED CMS G CODE MODIFIER DAILY ACTIVITY: CK

## 2025-05-09 ASSESSMENT — FIBROSIS 4 INDEX
FIB4 SCORE: 6.14
FIB4 SCORE: 6.14

## 2025-05-09 ASSESSMENT — ACTIVITIES OF DAILY LIVING (ADL): TOILETING: REQUIRES ASSIST

## 2025-05-09 NOTE — CONSULTS
INFECTIOUS DISEASES INPATIENT CONSULT NOTE     Date of Service: 5/9/2025    Consult Requested By: Shashank Gray M.D.    Reason for Consultation: Pseudomonas bacteremia    History of Present Illness:   April Alicia is a 65 y.o. man with a history of alcoholic cirrhosis, paroxysmal atrial fibrillation, type 2 diabetes mellitus, seizure disorder, prior left AKA and hypertension admitted 5/6/2025 secondary to fevers, chills and cough.  Extensive review of emergency physician notes, hospital medicine notes and consultant notes performed.  Patient denies any urinary symptoms.  States she is having worsening abdominal pain over the past month with bloody diarrhea but no constipation.  CT of the chest, abdomen pelvis revealed moderate scattered abdominal ascites and small bilateral pleural effusions. 1 out of 2 blood cultures +5/6 positive for Pseudomonas aeruginosa.  Respiratory panel PCR negative.  Is currently on IV Zosyn.  Unfortunately, ascites too small to tap.  Infectious disease service consulted antibiotic recommendations.    All other review of systems reviewed and negative except those documented above in the HPI.     PMH:   Past Medical History:   Diagnosis Date    Abnormal finding of lung 12/27/2022    Acute exacerbation of chronic obstructive pulmonary disease (COPD) (HCC) 01/27/2020    Alcoholic cirrhosis (HCC)     Arthritis     OA in knees    ASTHMA     Atrial fibrillation (HCC)     Dry eyes 11/16/2017    Edentulous     upper and lower dentures    Emphysema of lung (HCC)     H/O: hysterectomy 12/05/2019    Heart burn     left knee    Hepatic encephalopathy (HCC) 12/27/2022    Hepatitis C infection 07/28/2022    History of rheumatic fever 09/04/2017    Hypertension     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HCC)     Recurrent, L knee, x4 with surgical washout    Medication overuse headache 08/15/2017    Mitral regurgitation     Pancytopenia (HCC) 07/26/2022    Pneumonia 02/2020    Primary osteoarthritis  of left knee 08/25/2020    Prosthetic joint infection (HCC) 2018    Right knee after total knee    Protein-calorie malnutrition, severe (HCC) 08/01/2022    Right leg DVT (HCC) 2018    acute, resolved    Seizure disorder (HCC)     Septic arthritis of knee, left (HCC) 07/19/2022    Septic arthritis of shoulder, left (HCC) 07/17/2022    Septic shock due to Pseudomonas species (HCC) 10/30/2023    Type 2 diabetes mellitus (HCC) 12/27/2022       PSH:  Past Surgical History:   Procedure Laterality Date    CT UPPER GI ENDOSCOPY,DIAGNOSIS N/A 11/27/2024    Procedure: GASTROSCOPY;  Surgeon: Jose Guadalupe Velázquez M.D.;  Location: SURGERY SAME DAY [facility];  Service: Gastroenterology    KNEE AMPUTATION ABOVE Left 1/16/2024    Procedure: LEFT ABOVE KNEE AMPUTATION;  Surgeon: Obdulio Gray M.D.;  Location: [facility];  Service: Orthopedics    PB TOTAL KNEE ARTHROPLASTY Left 10/30/2023    Procedure: LEFT TOTAL KNEE ARTHROPLASTY EXPLANTATION, INSERTION OF ANTIBIOTIC SPACER, AND APPLICATION OF INCISIONAL WOUND VACUUM;  Surgeon: Wild Luz M.D.;  Location: [facility];  Service: Orthopedics    PB REVISE KNEE JOINT REPLACE,ALL PARTS Left 12/28/2022    Procedure: REVISION, TOTAL ARTHROPLASTY, KNEE, ALL COMPONENTS-LEFT;  Surgeon: Wild Luz M.D.;  Location: [facility];  Service: Orthopedics    WOUND IRRIGATION & DEBRIDEMENT Left 12/28/2022    Procedure: IRRIGATION AND DEBRIDEMENT, WOUND;  Surgeon: Wild Luz M.D.;  Location: [facility];  Service: Orthopedics    PB REVISE KNEE JOINT REPLACE,ALL PARTS Left 7/19/2022    Procedure: REVISION, TOTAL ARTHROPLASTY, KNEE, ALL COMPONENTS - ANTIBIOTIC SPACER;  Surgeon: Wild Luz M.D.;  Location: [facility];  Service: Orthopedics    WOUND IRRIGATION & DEBRIDEMENT Left 7/17/2022    Procedure: IRRIGATION AND DEBRIDEMENT, SHOULDER;  Surgeon: Obdulio Gray M.D.;  Location: [facility];  Service: Orthopedics    PB TOTAL KNEE  ARTHROPLASTY Left 2020    Procedure: ARTHROPLASTY, KNEE, TOTAL;  Surgeon: Víctor Faustin M.D.;  Location: SURGERY [facility];  Service: Orthopedics    KNEE ARTHROPLASTY TOTAL Right 2018    IRRIGATION & DEBRIDEMENT ORTHO Right 9/3/2017    Procedure: IRRIGATION & DEBRIDEMENT ORTHO, POLY EXCHANGE;  Surgeon: Jerome Russell M.D.;  Location: SURGERY [facility];  Service: Orthopedics    COLONOSCOPY WITH CLIPPING  10/28/2015    Procedure: COLONOSCOPY WITH CLIPPING;  Surgeon: Ruben Colon M.D.;  Location: ENDOSCOPY [facility];  Service:     COLONOSCOPY WITH SCLEROTHERAPY  10/28/2015    Procedure: COLONOSCOPY WITH SCLEROTHERAPY;  Surgeon: Ruben Colon M.D.;  Location: ENDOSCOPY [facility];  Service:     COLONOSCOPY WITH TATTOOING  10/28/2015    Procedure: COLONOSCOPY WITH TATTOOING;  Surgeon: Ruben Colon M.D.;  Location: ENDOSCOPY [facility];  Service:     GYN SURGERY      hysteroscoopy    GYN SURGERY      tubal ligation       FAMILY HX:  Family History   Problem Relation Age of Onset    Diabetes Mother     Seizures Father     Heart Attack Father 45    Diabetes Brother     Alcohol abuse Brother     Dementia Brother     Diabetes Brother        SOCIAL HX:  Social History     Socioeconomic History    Marital status: Single     Spouse name: Not on file    Number of children: 2    Years of education: Not on file    Highest education level: 11th grade   Occupational History    Occupation: CNA   Tobacco Use    Smoking status: Former     Current packs/day: 0.00     Types: Cigarettes     Quit date: 2016     Years since quittin.3    Smokeless tobacco: Former     Quit date: 2021    Tobacco comments:     a few a day when I can   Vaping Use    Vaping status: Never Used   Substance and Sexual Activity    Alcohol use: Not Currently     Comment: hx of AUD    Drug use: Not Currently    Sexual activity: Not Currently     Partners: Male     Birth control/protection:  Post-Menopausal     Comment: Has boyfriend, not currently sexually active   Other Topics Concern    Not on file   Social History Narrative    Not on file     Social Drivers of Health     Financial Resource Strain: Low Risk  (2020)    Overall Financial Resource Strain (CARDIA)     Difficulty of Paying Living Expenses: Not hard at all   Food Insecurity: No Food Insecurity (2025)    Hunger Vital Sign     Worried About Running Out of Food in the Last Year: Never true     Ran Out of Food in the Last Year: Never true   Transportation Needs: No Transportation Needs (2025)    PRAPARE - Transportation     Lack of Transportation (Medical): No     Lack of Transportation (Non-Medical): No   Physical Activity: Not on file   Stress: Not on file   Social Connections: Not on file   Intimate Partner Violence: Not At Risk (2025)    Humiliation, Afraid, Rape, and Kick questionnaire     Fear of Current or Ex-Partner: No     Emotionally Abused: No     Physically Abused: No     Sexually Abused: No   Housing Stability: Low Risk  (2025)    Housing Stability Vital Sign     Unable to Pay for Housing in the Last Year: No     Number of Times Moved in the Last Year: 0     Homeless in the Last Year: No     Social History     Tobacco Use   Smoking Status Former    Current packs/day: 0.00    Types: Cigarettes    Quit date: 2016    Years since quittin.3   Smokeless Tobacco Former    Quit date: 2021   Tobacco Comments    a few a day when I can     Social History     Substance and Sexual Activity   Alcohol Use Not Currently    Comment: hx of AUD       Allergies/Intolerances:  Allergies   Allergen Reactions    Meropenem Swelling     Pt had rxt to meropenem 2024 (marked tongue swelling and right facial/periorbital edema)    Tuberculin Tests Unspecified     Per MAR from [facility]       History reviewed with the patient     Other Current Medications:    Current Facility-Administered Medications:     midodrine  (Proamatine) tablet 5 mg, 5 mg, Oral, TID WITH MEALS, KATE CabreraNErrolP., 5 mg at 05/09/25 0643    NS infusion, , Intravenous, Continuous, Shashank Gray M.D., Last Rate: 30 mL/hr at 05/08/25 1145, Restarted at 05/08/25 1145    [Held by provider] heparin injection 5,000 Units, 5,000 Units, Subcutaneous, Q12HRS, Shashank Gray M.D., 5,000 Units at 05/09/25 0545    acetaminophen (Tylenol) tablet 650 mg, 650 mg, Oral, Q6HRS PRN, Edmund Malone M.D., 650 mg at 05/08/25 2206    ondansetron (Zofran) syringe/vial injection 4 mg, 4 mg, Intravenous, Q4HRS PRN **OR** ondansetron (Zofran ODT) dispertab 4 mg, 4 mg, Oral, Q4HRS PRN, Edmund Malone M.D.    albuterol inhaler 2 Puff, 2 Puff, Inhalation, Q4HRS PRN, Edmund Malone M.D.    atorvastatin (Lipitor) tablet 20 mg, 20 mg, Oral, Nightly, Edmund Malone M.D., 20 mg at 05/08/25 2206    furosemide (Lasix) tablet 40 mg, 40 mg, Oral, DAILY, Shashank Gary M.D., 40 mg at 05/09/25 0545    gabapentin (Neurontin) capsule 200 mg, 200 mg, Oral, BID, Edmund Malone M.D., 200 mg at 05/09/25 0545    lactulose 20 GM/30ML solution 30 mL, 30 mL, Oral, 4X/DAY, Shashank Gray M.D., 30 mL at 05/08/25 2206    oxyCODONE immediate-release (Roxicodone) tablet 5 mg, 5 mg, Oral, Q6HRS PRN, Edmund Malone M.D., 5 mg at 05/09/25 0256    riFAXIMin (Xifaxan) tablet 550 mg, 550 mg, Oral, BID, Shashank Gray M.D., 550 mg at 05/09/25 0545    omeprazole (PriLOSEC) capsule 20 mg, 20 mg, Oral, DAILY, Edmund Malone M.D., 20 mg at 05/09/25 0545    insulin lispro (HumaLOG,AdmeLOG) subcutaneous injection, 1-6 Units, Subcutaneous, 4X/DAY ACHS, 1 Units at 05/08/25 1143 **AND** POC blood glucose manual result, , , Q6H **AND** NOTIFY MD and PharmD, , , Once **AND** Administer 20 grams of glucose (approximately 8 ounces of fruit juice) every 15 minutes PRN FSBG less than 70 mg/dL, , , PRN **AND** dextrose 50 % (D50W) injection 25 g, 25 g, Intravenous, Q15 MIN PRN, Shashank Gray M.D.     "Metoprolol Tartrate (Lopressor) injection 5 mg, 5 mg, Intravenous, Q5 MIN PRN, Shashank Gray M.D., 5 mg at 25 1818    metoprolol SR (Toprol XL) tablet 50 mg, 50 mg, Oral, DAILY, Shashank Gray M.D., 50 mg at 25 0545    [COMPLETED] piperacillin-tazobactam (Zosyn) 4.5 g in  mL IVPB, 4.5 g, Intravenous, Once, Stopped at 25 1913 **AND** piperacillin-tazobactam (Zosyn) 4.5 g in  mL IVPB, 4.5 g, Intravenous, Q8HRS, Shashank Gray M.D., Last Rate: 25 mL/hr at 25 0551, 4.5 g at 25 0551  [unfilled]    Most Recent Vital Signs:  BP 95/53   Pulse 65   Temp 36.9 °C (98.4 °F) (Temporal)   Resp 16   Ht 1.702 m (5' 7\")   Wt 102 kg (224 lb 6.9 oz)   LMP 2008   SpO2 97%   BMI 35.15 kg/m²   Temp  Av °C (98.6 °F)  Min: 36.1 °C (97 °F)  Max: 38.2 °C (100.8 °F)    Physical Exam:  General: well nourished, no diaphoresis, well-appearing, no acute distress  HEENT: sclera anicteric, PERRL, extraocular muscles intact, mucous membranes moist, oropharynx clear and moist, no oral lesions or exudate, hard of hearing  Neck: supple, no lymphadenopathy  Chest: CTAB, no rales, rhonchi or wheezes, normal work of breathing.  Cardiac: regular rate and rhythm, normal S1 S2, no murmurs, rubs or gallops  Abdomen: + bowel sounds, soft, nondistended, mildly tender to palpation, no hepatosplenomegaly  Extremities: WWP, no edema, 2+ pedal pulses, evidence of left AKA stump-clean  Skin: warm and dry, no rashes or worrisome lesions  Neuro: Alert and oriented times 3, non-focal exam, speech fluent, full range of motion to bilateral upper and lower extremities  Psych: normal mood and behavior, pleasant; memory intact, normal judgement    Pertinent Lab Results:  Recent Labs     25  1441 25  0239 25  0112   WBC 8.7 11.5* 10.2      Recent Labs     25  1441 25  0239 25  0112   HEMOGLOBIN 7.9* 7.3* 6.7*   HEMATOCRIT 25.4* 22.3* 21.7*   MCV 90.7 91.0 93.5   MCH 28.2 29.8 " 28.9   MACROCYTOSIS 1+  --   --    ANISOCYTOSIS 1+  --   --    PLATELETCT 72* 70* 60*         Recent Labs     05/07/25  1441 05/08/25  0239 05/09/25  0112   SODIUM 136 135 136   POTASSIUM 4.6 4.7 4.3   CHLORIDE 111 111 113*   CO2 16* 15* 17*   CREATININE 1.20 1.46* 1.47*        Recent Labs     05/07/25  1441 05/08/25  0239 05/09/25  0112   ALBUMIN 2.5* 2.3* 2.2*        Pertinent Micro:  Results       Procedure Component Value Units Date/Time    Urine Culture (New) [[id number]]  (Abnormal) Collected: 05/07/25 0437    Order Status: Completed Specimen: Urine Updated: 05/08/25 1608     Significant Indicator POS     Source UR     Site -     Culture Result -      Escherichia coli  >100,000 cfu/mL      Blood Culture - Draw one from central line and one from peripheral site [[id number]]  (Abnormal) Collected: 05/06/25 2128    Order Status: Completed Specimen: Blood from Line Updated: 05/08/25 1157     Significant Indicator POS     Source BLD     Site Peripheral     Culture Result Growth detected by automated blood culture system.      Pseudomonas species    Respiratory Panel by PCR (Inpatient ONLY) [[id number]] Collected: 05/07/25 1449    Order Status: Completed Specimen: Respirate from Nasopharyngeal Updated: 05/07/25 1606     Adenovirus, PCR Not Detected     SARS-CoV-2 (COVID-19) RNA by ALFREDA NotDetected     Comment: [facility] providers: PLEASE REFER TO DE-ESCALATION AND RETESTING PROTOCOL  on [email or web address]    **The [employer] Respiratory Panel 2.1 (RP2.1) RT-PCR test is FDA authorized.          Coronavirus 229E, PCR Not Detected     Coronavirus HKU1, PCR Not Detected     Coronavirus NL63, PCR Not Detected     Coronavirus OC43, PCR Not Detected     Human Metapneumovirus, PCR Not Detected     Rhino/Enterovirus, PCR Not Detected     Influenza A, PCR Not Detected     Influenza B, PCR Not Detected     Parainfluenza 1, PCR Not Detected     Parainfluenza 2, PCR Not Detected     Parainfluenza 3, PCR Not Detected     Parainfluenza 4, PCR  Not Detected     RSV (Respiratory Syncytial Virus), PCR Not Detected     Bordetella parapertussis (UO3447), PCR Not Detected     Bordetella pertussis (ptxP), PCR Not Detected     Mycoplasma pneumoniae, PCR Not Detected     Chlamydia pneumoniae, PCR Not Detected    Fluid Culture with Gram Stain [[id number]]     Order Status: Sent Specimen: Body Fluid from Peritoneal Fluid     Urinalysis [[id number]]  (Abnormal) Collected: 05/07/25 0437    Order Status: Completed Specimen: Urine Updated: 05/07/25 0458     Color Yellow     Character Clear     Specific Gravity 1.023     Ph 5.0     Glucose Negative mg/dL      Ketones Trace mg/dL      Protein Negative mg/dL      Bilirubin Negative     Urobilinogen, Urine 0.2 EU/dL      Nitrite Negative     Leukocyte Esterase Moderate     Occult Blood Large     Micro Urine Req Microscopic    Blood Culture - Draw one from central line and one from peripheral site [[id number]] Collected: 05/06/25 2116    Order Status: Completed Specimen: Blood from Peripheral Updated: 05/07/25 0008     Significant Indicator NEG     Source BLD     Site PERIPHERAL     Culture Result No Growth  Note: Blood cultures are incubated for 5 days and  are monitored continuously.Positive blood cultures  are called to the RN and reported as soon as  they are identified.            Blood Culture   Date Value Ref Range Status   09/04/2017 No growth after 5 days of incubation.  Final     Blood Culture Hold   Date Value Ref Range Status   10/03/2020 Collected  Final        Studies:  US-ABDOMEN LTD (SOFT TISSUE)  Result Date: 5/7/2025 5/7/2025 2:57 PM HISTORY/REASON FOR EXAM: TECHNIQUE/EXAM DESCRIPTION: Limited abdominal ultrasound. COMPARISON: None available. FINDINGS: Focused ultrasound of the 4 quadrants of the abdomen demonstrates small volume ascites.     1. Small volume ascites. No safe access window for paracentesis at this time.     CT-CHEST,ABDOMEN,PELVIS WITH  Result Date: 5/7/2025 5/7/2025 12:08 AM HISTORY/REASON  FOR EXAM:  Sepsis, hypoxic. TECHNIQUE/EXAM DESCRIPTION: CT scan of the chest, abdomen and pelvis with contrast. Thin-section helical scanning was obtained with intravenous contrast from the lung apices through the pubic symphysis to include the chest, abdomen and pelvis. 100 mL of Omnipaque 350 nonionic contrast was administered intravenously without complication. Low dose optimization technique was utilized for this CT exam including automated exposure control and adjustment of the mA and/or kV according to patient size. COMPARISON: None. FINDINGS: CT Chest: Lungs: Linear densities the bilateral lung bases are seen.. Mediastinum/Jihan: No significant adenopathy. Pleura: Small bilateral pleural effusions are seen, left greater than right. Cardiac: Cardiomegaly is seen. Vascular: Prominent main pulmonary artery is seen. Atherosclerotic calcifications are seen including atherosclerotic coronary artery calcifications.. Soft tissues: Unremarkable. Bones: No acute or destructive process. CT Abdomen and Pelvis: Liver: Nodular hepatic contour is seen.. Recanalization of the umbilical vein is seen with prominent anterior abdominal wall varices Spleen: Splenomegaly is noted. Pancreas: Unremarkable. Gallbladder: No calcified stones. Biliary: Nondilated. Adrenal glands: Normal. Kidneys: Unremarkable without hydronephrosis. Bowel: No obstruction or acute inflammation. Lymph nodes: No adenopathy. Vasculature: Unremarkable. Peritoneum: Moderate scattered abdominal ascites is seen. Musculoskeletal: No acute or destructive process. Pelvis: No adenopathy or free fluid. Postsurgical changes of hysterectomy are seen.     1.  Cirrhosis with recanalization of the umbilical vein and prominent anterior abdominal wall varices, compatible with portal hypertension. 2.  Splenomegaly. 3.  Moderate scattered abdominal ascites. 4.  Small bilateral pleural effusions, left greater than right. 5.  Linear densities the bilateral lung bases favor  changes of atelectasis. 6.  Cardiomegaly. 7.  Prominent main pulmonary artery, appearance suggests component of pulmonary arterial hypertension. 8.  Atherosclerosis and atherosclerotic coronary artery disease.    DX-CHEST-LIMITED (1 VIEW)  Result Date: 5/6/2025 5/6/2025 9:32 PM HISTORY/REASON FOR EXAM: Shortness of Breath TECHNIQUE/EXAM DESCRIPTION:  Single AP view of the chest. COMPARISON: January 15, 2025 FINDINGS: Overlying cardiac leads are present. Cardiomegaly is observed. Atherosclerotic calcification of the aorta is noted. Calcified mediastinal lymph nodes are seen, stable. The central pulmonary vasculature appears normal. The lungs appear well expanded bilaterally.  Bilateral lungs are clear. Blunting of bilateral costophrenic angles is seen, compatible with trace bilateral pleural effusions. The bony structures appear age-appropriate.     1.  No acute cardiopulmonary disease. 2.  Trace bilateral pleural effusions 3.  Cardiomegaly. 4.  Atherosclerosis.      IMPRESSION:   Pseudomonas bacteremia, likely secondary to GI source given recent diarrhea  Diarrhea  Acute on chronic anemia  Asymptomatic bacteriuria, urine culture+ E. coli  Ascites, insufficient amount of fluid to tap  Acute kidney injury  Alcoholic cirrhosis  Chronic thrombocytopenia    ASSESSMENT/PLAN:   April Alicia is a 65 y.o. woman with a history of alcoholic cirrhosis, hypertension, prior left AKA, type 2 diabetes and seizure disorder admitted on 5/6/2025 secondary to fever, chills, abdominal pain and bloody diarrhea.  Respiratory viral panel PCR negative.  CT chest revealed small bilateral pleural effusions and CT abdomen with moderate ascites however not amenable to drainage given amount.  1 blood culture sent on admission is now positive for Pseudomonas aeruginosa.  Source of her bacteremia is likely from the gut given her recent diarrhea.    -Check C. difficile PCR-ordered  -Continue IV Zosyn 4.5 g every 8 hours  -Blood  culture on 5/6 (1 out of 2 sets)+ Pseudomonas aeruginosa.  Follow susceptibilities  -EKG with QTc of 484    This infection pose a threat to life    Plan of care discussed with HERB Gray M.D.. Will continue to follow    Shantal Shahid M.D.      Please note that this dictation was created using voice recognition software. I have worked with technical experts from [facility] to optimize the interface.  I have made every reasonable attempt to correct obvious errors, but there may be errors of grammar and possibly content that I did not discover before finalizing the note.

## 2025-05-09 NOTE — CARE PLAN
"The patient is Stable - Low risk of patient condition declining or worsening    Shift Goals  Clinical Goals: Patient will verbalize understanding of POC. Patient's pain will remain controlled throughout shift. Patient will remain free from skin breakdown.  Patient Goals: \"Get a bed bath.\"  Family Goals: EDMUND    Progress made toward(s) clinical / shift goals:  Patient provided a verbal discussion related to POC and verbalized understanding. Obtained orders for blood transfusion for acute anemia. Patient's blood pressure has greatly improved since blood transfusion. Pt is able to verbalize pain on a scale of 1-10 and is able to verbalize comfort goal. Pain management plan followed and pt educated on nonpharmacologic and pharmacological comfort measures. Pt remains free from falls at this time. Safety precautions in place. Pt educated on calling for assistance when needed. Patient educated to utilize call light. Patient and family oriented to hospital room. Patient encouraged to ask questions about plan of care. Patient effectively uses call light and is involved in POC. Proper hand hygiene before and after patient care to ensure patient will remain free from infection.       Patient is not progressing towards the following goals: N/A      "

## 2025-05-09 NOTE — CARE PLAN
The patient is Stable - Low risk of patient condition declining or worsening    Patient complains of ABD pain, meds given. She had 2 Bms tonight. BP for 0400 was low MAP (59) after receiving oxy. Order for midodrine received and given. Turned Q2hr, excoriation around buttocks- zinc past applied. A/OX4, slurred speech.    Shift Goals  Clinical Goals: q2hr turn, vitals, pain meds, abx  Patient Goals: rest  Family Goals: gisele    Progress made toward(s) clinical / shift goals:    Problem: Knowledge Deficit - Standard  Goal: Patient and family/care givers will demonstrate understanding of plan of care, disease process/condition, diagnostic tests and medications  Outcome: Progressing     Problem: Hemodynamics  Goal: Patient's hemodynamics, fluid balance and neurologic status will be stable or improve  Outcome: Progressing     Problem: Fluid Volume  Goal: Fluid volume balance will be maintained  Outcome: Progressing     Problem: Urinary - Renal Perfusion  Goal: Ability to achieve and maintain adequate renal perfusion and functioning will improve  Outcome: Progressing     Problem: Respiratory  Goal: Patient will achieve/maintain optimum respiratory ventilation and gas exchange  Outcome: Progressing     Problem: Mechanical Ventilation  Goal: Safe management of artificial airway and ventilation  Outcome: Progressing  Goal: Successful weaning off mechanical ventilator, spontaneously maintains adequate gas exchange  Outcome: Progressing  Goal: Patient will be able to express needs and understand communication  Outcome: Progressing     Problem: Physical Regulation  Goal: Diagnostic test results will improve  Outcome: Progressing  Goal: Signs and symptoms of infection will decrease  Outcome: Progressing     Problem: Skin Integrity  Goal: Skin integrity is maintained or improved  Outcome: Progressing     Problem: Fall Risk  Goal: Patient will remain free from falls  Outcome: Progressing     Problem: Pain - Standard  Goal:  Alleviation of pain or a reduction in pain to the patient’s comfort goal  Outcome: Progressing       Patient is not progressing towards the following goals:

## 2025-05-09 NOTE — PROGRESS NOTES
[facility] Medicine Daily Progress Note    Date of Service  5/9/2025    Chief Complaint  April Alicia is a 65 y.o. female admitted 5/6/2025 with fevers, chills, cough.    Hospital Course  Patient is a 65-year-old female with past medical history of alcoholic cirrhosis, migraine, HTN, paroxysmal atrial fibrillation not on anticoagulation, GERD, chronic pain, NIDDM 2, seizure disorder, asthma, status post left AKA that presents with 1 day history of malaise, dry cough, fever, chills.     Patient states that they have been having a dry cough, full body soreness, fever, chills since yesterday.  Denies any lower urinary tract symptoms.  States that they would always have abdominal pain which is unchanged.     In the ED, BP 90s to 130s/50s to 90s, HR 60s to 150s, RR 11-21, Tmax 100.8 F, saturating well on room air.  Blood cultures x 2 drawn in the ED.  Received LR 1 L bolus x 1, diltiazem 18.6 mg IV x 1.  WBC 6.2, hemoglobin 7.8, PLT 86, bicarb 17, anion gap 9, BUN 45, creatinine 1.35, alk phos 211, lactic acid 1.6, troponin 25>> 19, NT proBNP 894.  COVID/flu/RSV negative.  UA with trace ketones, large occult blood, negative nitrite, moderate leukocyte esterase, 21-50 WBC, 21-50 RBC, many bacteria.  Chest x-ray with trace bilateral pleural effusions.  CT chest abdomen pelvis with contrast showing cirrhosis with recannulization of the umbilical vein and prominent anterior abdominal wall varices compatible with portal hypertension, splenomegaly, moderate scattered abdominal ascites, small bilateral pleural effusions left greater than right, prominent main pulmonary artery suggesting pulmonary arterial hypertension.  EKG atrial fibrillation , QTc 484, with diffuse T wave flattening, similar to previous.    Interval Problem Update  5/7 patient reports symptoms of subjective fevers, chills, nausea, vomiting, abdominal pain, shallow breathing. Reports feeling very tired and fatigued and unable to ambulate.  She  denies any dysuria.  She is noted to be anemic with a hemoglobin of 7.8 and thrombocytopenic with a platelet count of 86.  Monitor CBC and transfuse for hemoglobin less than 7 or platelets less than 10 or if she develops active bleeding.  Ultrasound-guided paracentesis is pending.  Continue empiric IV ceftriaxone and Flagyl    5/8 Patient as blood cultures positive for Pseudomonas.  Switch antibiotics to IV Zosyn.  Likely source is intra-abdominal.  She does report some lower abdominal pain.  CT chest, abdomen and pelvis revealed evidence of cirrhosis and portal hypertension with scattered abdominal ascites and small bilateral pleural effusions.  Will consult ID.  Ultrasound reveals small volume ascites and no safe access window for paracentesis.    5/9 Patient reported bloody bowel movements that she states have been going on for the past week.  Her hemoglobin dropped to 6.7 this morning and she received a unit of PRBC.  Platelets have been low at 60.  Avoid aspirin or anticoagulation.  I will start the patient on IV Protonix.  I have consulted and discussed the case with GI who will evaluate the patient and plan for EGD/colonoscopy tomorrow.  Continue cardiac monitoring.  I also discussed the case with ID and at this point we will continue with IV Zosyn for treatment of her Pseudomonas bacteremia.    I have discussed this patient's plan of care and discharge plan at IDT rounds today with Case Management, Nursing, Nursing leadership, and other members of the IDT team.    Consultants/Specialty  None    Code Status  Full Code    Disposition  Not medically cleared  I have placed the appropriate orders for post-discharge needs.    Review of Systems  Review of Systems   Constitutional:  Positive for chills and malaise/fatigue. Negative for fever.   HENT: Negative.     Eyes: Negative.    Respiratory:  Positive for cough and shortness of breath.    Cardiovascular:  Negative for chest pain.   Gastrointestinal:  Positive  for abdominal pain, blood in stool, melena, nausea and vomiting.   Genitourinary:  Negative for dysuria.   Musculoskeletal:  Positive for myalgias.   Skin: Negative.    Neurological:  Positive for dizziness and weakness (Generalized).   Endo/Heme/Allergies: Negative.    Psychiatric/Behavioral: Negative.          Physical Exam  Temp:  [36.1 °C (97 °F)-36.9 °C (98.4 °F)] 36.8 °C (98.2 °F)  Pulse:  [58-97] 60  Resp:  [13-18] 16  BP: ()/(33-71) 116/70  SpO2:  [95 %-99 %] 99 %    Physical Exam  Vitals and nursing note reviewed.   Constitutional:       Appearance: She is obese. She is ill-appearing.   HENT:      Head: Normocephalic and atraumatic.      Nose: Nose normal.      Mouth/Throat:      Mouth: Mucous membranes are dry.   Eyes:      Pupils: Pupils are equal, round, and reactive to light.   Cardiovascular:      Rate and Rhythm: Normal rate. Rhythm irregular.      Pulses: Normal pulses.      Heart sounds: Normal heart sounds.   Pulmonary:      Effort: Pulmonary effort is normal.      Breath sounds: No rhonchi.   Abdominal:      General: There is distension.      Tenderness: There is abdominal tenderness.   Musculoskeletal:         General: Normal range of motion.      Right lower leg: Edema present.      Left lower leg: Edema present.   Skin:     General: Skin is warm.      Capillary Refill: Capillary refill takes less than 2 seconds.   Neurological:      General: No focal deficit present.      Mental Status: She is alert.   Psychiatric:         Behavior: Behavior normal.         Fluids    Intake/Output Summary (Last 24 hours) at 5/9/2025 1316  Last data filed at 5/9/2025 1132  Gross per 24 hour   Intake 1390.5 ml   Output 250 ml   Net 1140.5 ml        Laboratory  Recent Labs     05/07/25  1441 05/08/25  0239 05/09/25  0112   WBC 8.7 11.5* 10.2   RBC 2.80* 2.45* 2.32*   HEMOGLOBIN 7.9* 7.3* 6.7*   HEMATOCRIT 25.4* 22.3* 21.7*   MCV 90.7 91.0 93.5   MCH 28.2 29.8 28.9   MCHC 31.1* 32.7 30.9*   RDW 53.6* 52.8*  54.7*   PLATELETCT 72* 70* 60*   MPV 12.8 11.9 12.3     Recent Labs     05/07/25  1441 05/08/25  0239 05/09/25  0112   SODIUM 136 135 136   POTASSIUM 4.6 4.7 4.3   CHLORIDE 111 111 113*   CO2 16* 15* 17*   GLUCOSE 86 148* 140*   BUN 42* 44* 49*   CREATININE 1.20 1.46* 1.47*   CALCIUM 7.9* 7.5* 7.5*     Recent Labs     05/06/25  2116   INR 1.43*               Imaging  US-ABDOMEN LTD (SOFT TISSUE)   Final Result         1. Small volume ascites. No safe access window for paracentesis at this time.         CT-CHEST,ABDOMEN,PELVIS WITH   Final Result         1.  Cirrhosis with recanalization of the umbilical vein and prominent anterior abdominal wall varices, compatible with portal hypertension.   2.  Splenomegaly.   3.  Moderate scattered abdominal ascites.   4.  Small bilateral pleural effusions, left greater than right.   5.  Linear densities the bilateral lung bases favor changes of atelectasis.   6.  Cardiomegaly.   7.  Prominent main pulmonary artery, appearance suggests component of pulmonary arterial hypertension.   8.  Atherosclerosis and atherosclerotic coronary artery disease.      DX-CHEST-LIMITED (1 VIEW)   Final Result         1.  No acute cardiopulmonary disease.   2.  Trace bilateral pleural effusions   3.  Cardiomegaly.   4.  Atherosclerosis.           Assessment/Plan  * Sepsis (HCC)- (present on admission)  Assessment & Plan  -SIRS criteria identified on my evaluation include: Fever, with temperature greater than 100.9 deg F, Tachycardia, with heart rate greater than 90 BPM, and Tachypnea, with respirations greater than 20 per minute  -Clinical indicators of end organ dysfunction include Hypotension with systolic blood pressure less than 90 or MAP less than 65 and Hypotension with decrease in systolic blood pressure of more than 40mmHg  -Source is intra-abdominal versus respiratory  -Sepsis protocol initiated  -Crystalloid Fluid Administration: Fluid resuscitation ordered per standard protocol - 30  mL/kg per current or ideal body weight  -Start IV Zosyn  - Blood culture positive for Pseudomonas, follow sensitivity report  -Order full respiratory panel      GI bleed- (present on admission)  Assessment & Plan  In the setting of alcohol cirrhosis and thrombocytopenia  I have started the patient on IV Protonix  I have started the patient on octreotide drip, continue close cardiac monitoring  With hematemesis and melena, likely upper GI source  Continuous cardiac monitoring  Patient has been started on IV fluid hydration with ns  NPO at midnight  Monitor H&H every 8 hours, transfuse for hemoglobin less than 7  INR abnormal, will give vitamin K and assess response   I have consulted GI      DM2 (diabetes mellitus, type 2) ([facility])- (present on admission)  Assessment & Plan  - Hypoglycemia protocol  - Diabetic diet  - SSI    Elevated troponin- (present on admission)  Assessment & Plan  -Troponin 25>> 19.  Likely nonischemic myocardial injury in the setting of sepsis.    -EKG without ST changes.  Patient denies chest pain.    Hx of AKA (above knee amputation), left ([facility])- (present on admission)  Assessment & Plan  -History of left AKA 01/2024.    Seizures ([facility])- (present on admission)  Assessment & Plan  - Continue home gabapentin    Atrial fibrillation with RVR ([facility])- (present on admission)  Assessment & Plan  -A-fib with RVR up to the 150s on admission.  Likely in the setting of sepsis. EKG atrial fibrillation , QTc 484, with diffuse T wave flattening, similar to previous.  -Received diltiazem 18.6 mg IV x 1 in the ED  -Hold off on any other AV bibi blockade at this time in the setting of sepsis with hypotension  -Currently rate controlled.  Monitor on telemetry.    ELIZABETH (acute kidney injury) (HCC)- (present on admission)  Assessment & Plan  -Creatinine 1.35, baseline 0.7-0.9.  Likely hypoperfusion in the setting of sepsis complicated by atrial fibrillation with RVR.  -Daily BMP  -Avoid nephrotoxic  medications  -Renally dose medications    Normal anion gap metabolic acidosis- (present on admission)  Assessment & Plan  -Bicarb 17 anion gap 9  -Daily BMP    Bacteremia due to Pseudomonas- (present on admission)  Assessment & Plan  Started on IV Zosyn 4.5 g  Will consult ID  Likely source is intra-abdominal  Unable to do paracentesis due to small volume ascites  Repeat blood cultures    Mild intermittent asthma without complication- (present on admission)  Assessment & Plan  -No sign of exacerbation  -Continue home albuterol as needed    Chronic pain syndrome- (present on admission)  Assessment & Plan  - Continue home oxycodone and gabapentin    GERD (gastroesophageal reflux disease)- (present on admission)  Assessment & Plan  - Continue home PPI    Normocytic anemia- (present on admission)  Assessment & Plan  -Hemoglobin 7.8, around baseline ranges from 7-10.  Likely in the setting of cirrhosis.  No sign of acute bleed.  -Daily CBC    Asymptomatic bacteriuria- (present on admission)  Assessment & Plan  -Patient denies LUTS.  UA with trace ketones, large occult blood, negative nitrite, moderate leukocyte esterase, 21-50 WBC, 21-50 RBC, many bacteria.  - Although low suspicion for UTI, patient is on IV Zosyn for pseudomonas bacteremia     Dyslipidemia- (present on admission)  Assessment & Plan  - Continue home atorvastatin    Hypertension- (present on admission)  Assessment & Plan  - Hold all antihypertensives and diuretics in the setting of sepsis    Thrombocytopenia (HCC)- (present on admission)  Assessment & Plan  -PLT 86, around baseline ranging from 40s to 100s.  Likely in the setting of cirrhosis.  No sign of acute bleed.  -Daily CBC    Alcoholic cirrhosis (HCC)- (present on admission)  Assessment & Plan  -Possible that patient has SBP given sepsis with unknown source at this time.    -CT showing cirrhosis with evidence of portal hypertension, moderate scattered abdominal ascites.  Quit drinking 10 years  ago.  - Restart furosemide, lactulose and rifaximin           VTE prophylaxis: scd    I have performed a physical exam and reviewed and updated ROS and Plan today (5/9/2025). In review of yesterday's note (5/8/2025), there are no changes except as documented above.    Patient is critically ill with life threatening GI bleeding, acute blood loss anemia requiring blood transfusion to prevent cardiovascular collapse.  I have started the patient on IV octreotide with close cardiac monitoring to assess for arrhythmias.  Serial hemoglobin has been ordered and will transfuse for Hb <7.  Total of 45 minutes of critical care time spent with patient, discussed on rounds with GI consultant, RN and pharmacy. This time is exclusive of any procedures performed and does not overlap with other physician's time.

## 2025-05-09 NOTE — ASSESSMENT & PLAN NOTE
In the setting of alcohol cirrhosis and thrombocytopenia  Continue IV Protonix  Continue close cardiac monitoring  Patient has been started on IV fluid hydration with ns  NPO at midnight  Monitor H&H every 8 hours, transfuse for hemoglobin less than 7  INR abnormal, will give vitamin K and assess response   underwent EGD and Colonoscopy that revealed grade 1 esophageal varices, portal hypertensive gastropathy and mild rectal varices

## 2025-05-10 LAB
ALBUMIN SERPL BCP-MCNC: 2.2 G/DL (ref 3.2–4.9)
ALBUMIN/GLOB SERPL: 0.5 G/DL
ALP SERPL-CCNC: 121 U/L (ref 30–99)
ALT SERPL-CCNC: 7 U/L (ref 2–50)
ANION GAP SERPL CALC-SCNC: 8 MMOL/L (ref 7–16)
AST SERPL-CCNC: 17 U/L (ref 12–45)
BILIRUB SERPL-MCNC: 0.9 MG/DL (ref 0.1–1.5)
BUN SERPL-MCNC: 43 MG/DL (ref 8–22)
CALCIUM ALBUM COR SERPL-MCNC: 9 MG/DL (ref 8.5–10.5)
CALCIUM SERPL-MCNC: 7.6 MG/DL (ref 8.5–10.5)
CHLORIDE SERPL-SCNC: 114 MMOL/L (ref 96–112)
CO2 SERPL-SCNC: 15 MMOL/L (ref 20–33)
CREAT SERPL-MCNC: 1.42 MG/DL (ref 0.5–1.4)
ERYTHROCYTE [DISTWIDTH] IN BLOOD BY AUTOMATED COUNT: 54.6 FL (ref 35.9–50)
GFR SERPLBLD CREATININE-BSD FMLA CKD-EPI: 41 ML/MIN/1.73 M 2
GLOBULIN SER CALC-MCNC: 4.1 G/DL (ref 1.9–3.5)
GLUCOSE BLD STRIP.AUTO-MCNC: 106 MG/DL (ref 65–99)
GLUCOSE BLD STRIP.AUTO-MCNC: 131 MG/DL (ref 65–99)
GLUCOSE SERPL-MCNC: 125 MG/DL (ref 65–99)
HCT VFR BLD AUTO: 26.1 % (ref 37–47)
HCT VFR BLD AUTO: 29 % (ref 37–47)
HGB BLD-MCNC: 8 G/DL (ref 12–16)
HGB BLD-MCNC: 8.9 G/DL (ref 12–16)
INR PPP: 1.53 (ref 0.87–1.13)
MCH RBC QN AUTO: 28.5 PG (ref 27–33)
MCHC RBC AUTO-ENTMCNC: 30.7 G/DL (ref 32.2–35.5)
MCV RBC AUTO: 92.9 FL (ref 81.4–97.8)
PLATELET # BLD AUTO: 72 K/UL (ref 164–446)
PLATELETS.RETICULATED NFR BLD AUTO: 1.1 % (ref 0.6–13.1)
PMV BLD AUTO: 12.8 FL (ref 9–12.9)
POTASSIUM SERPL-SCNC: 4.5 MMOL/L (ref 3.6–5.5)
PROT SERPL-MCNC: 6.3 G/DL (ref 6–8.2)
PROTHROMBIN TIME: 18.4 SEC (ref 12–14.6)
RBC # BLD AUTO: 3.12 M/UL (ref 4.2–5.4)
SODIUM SERPL-SCNC: 137 MMOL/L (ref 135–145)
WBC # BLD AUTO: 7.3 K/UL (ref 4.8–10.8)

## 2025-05-10 PROCEDURE — 99233 SBSQ HOSP IP/OBS HIGH 50: CPT | Performed by: INTERNAL MEDICINE

## 2025-05-10 PROCEDURE — 700102 HCHG RX REV CODE 250 W/ 637 OVERRIDE(OP): Performed by: INTERNAL MEDICINE

## 2025-05-10 PROCEDURE — 700102 HCHG RX REV CODE 250 W/ 637 OVERRIDE(OP)

## 2025-05-10 PROCEDURE — A9270 NON-COVERED ITEM OR SERVICE: HCPCS | Performed by: STUDENT IN AN ORGANIZED HEALTH CARE EDUCATION/TRAINING PROGRAM

## 2025-05-10 PROCEDURE — A9270 NON-COVERED ITEM OR SERVICE: HCPCS

## 2025-05-10 PROCEDURE — 85018 HEMOGLOBIN: CPT

## 2025-05-10 PROCEDURE — 700102 HCHG RX REV CODE 250 W/ 637 OVERRIDE(OP): Performed by: NURSE PRACTITIONER

## 2025-05-10 PROCEDURE — 97602 WOUND(S) CARE NON-SELECTIVE: CPT

## 2025-05-10 PROCEDURE — A9270 NON-COVERED ITEM OR SERVICE: HCPCS | Performed by: INTERNAL MEDICINE

## 2025-05-10 PROCEDURE — 700105 HCHG RX REV CODE 258: Performed by: INTERNAL MEDICINE

## 2025-05-10 PROCEDURE — 770020 HCHG ROOM/CARE - TELE (206)

## 2025-05-10 PROCEDURE — 36415 COLL VENOUS BLD VENIPUNCTURE: CPT

## 2025-05-10 PROCEDURE — 700111 HCHG RX REV CODE 636 W/ 250 OVERRIDE (IP): Performed by: INTERNAL MEDICINE

## 2025-05-10 PROCEDURE — 85055 RETICULATED PLATELET ASSAY: CPT

## 2025-05-10 PROCEDURE — 82962 GLUCOSE BLOOD TEST: CPT | Mod: 91

## 2025-05-10 PROCEDURE — 85610 PROTHROMBIN TIME: CPT

## 2025-05-10 PROCEDURE — 85027 COMPLETE CBC AUTOMATED: CPT

## 2025-05-10 PROCEDURE — 700101 HCHG RX REV CODE 250: Performed by: NURSE PRACTITIONER

## 2025-05-10 PROCEDURE — 700102 HCHG RX REV CODE 250 W/ 637 OVERRIDE(OP): Performed by: STUDENT IN AN ORGANIZED HEALTH CARE EDUCATION/TRAINING PROGRAM

## 2025-05-10 PROCEDURE — 99222 1ST HOSP IP/OBS MODERATE 55: CPT | Performed by: NURSE PRACTITIONER

## 2025-05-10 PROCEDURE — 80053 COMPREHEN METABOLIC PANEL: CPT

## 2025-05-10 PROCEDURE — A9270 NON-COVERED ITEM OR SERVICE: HCPCS | Performed by: NURSE PRACTITIONER

## 2025-05-10 PROCEDURE — 85014 HEMATOCRIT: CPT | Mod: 91

## 2025-05-10 RX ORDER — NYSTATIN 100000 [USP'U]/G
POWDER TOPICAL 2 TIMES DAILY
Status: DISCONTINUED | OUTPATIENT
Start: 2025-05-10 | End: 2025-05-13 | Stop reason: HOSPADM

## 2025-05-10 RX ORDER — SENNOSIDES 8.6 MG/1
17.2 TABLET ORAL ONCE
Status: COMPLETED | OUTPATIENT
Start: 2025-05-10 | End: 2025-05-10

## 2025-05-10 RX ADMIN — OXYCODONE 5 MG: 5 TABLET ORAL at 17:12

## 2025-05-10 RX ADMIN — PHYTONADIONE 10 MG: 10 INJECTION, EMULSION INTRAMUSCULAR; INTRAVENOUS; SUBCUTANEOUS at 13:43

## 2025-05-10 RX ADMIN — PIPERACILLIN AND TAZOBACTAM 4.5 G: 4; .5 INJECTION, POWDER, FOR SOLUTION INTRAVENOUS at 14:44

## 2025-05-10 RX ADMIN — RIFAXIMIN 550 MG: 550 TABLET ORAL at 17:04

## 2025-05-10 RX ADMIN — ATORVASTATIN CALCIUM 20 MG: 20 TABLET, FILM COATED ORAL at 20:43

## 2025-05-10 RX ADMIN — OXYCODONE 5 MG: 5 TABLET ORAL at 11:27

## 2025-05-10 RX ADMIN — LACTULOSE 30 ML: 10 SOLUTION ORAL at 13:37

## 2025-05-10 RX ADMIN — MIDODRINE HYDROCHLORIDE 5 MG: 5 TABLET ORAL at 07:59

## 2025-05-10 RX ADMIN — OXYCODONE 5 MG: 5 TABLET ORAL at 04:08

## 2025-05-10 RX ADMIN — PIPERACILLIN AND TAZOBACTAM 4.5 G: 4; .5 INJECTION, POWDER, FOR SOLUTION INTRAVENOUS at 06:25

## 2025-05-10 RX ADMIN — LACTULOSE 30 ML: 10 SOLUTION ORAL at 16:58

## 2025-05-10 RX ADMIN — LACTULOSE 30 ML: 10 SOLUTION ORAL at 20:43

## 2025-05-10 RX ADMIN — SENNOSIDES 17.2 MG: 8.6 TABLET, FILM COATED ORAL at 16:59

## 2025-05-10 RX ADMIN — MIDODRINE HYDROCHLORIDE 5 MG: 5 TABLET ORAL at 11:27

## 2025-05-10 RX ADMIN — SODIUM CHLORIDE: 9 INJECTION, SOLUTION INTRAVENOUS at 13:42

## 2025-05-10 RX ADMIN — PIPERACILLIN AND TAZOBACTAM 4.5 G: 4; .5 INJECTION, POWDER, FOR SOLUTION INTRAVENOUS at 20:46

## 2025-05-10 RX ADMIN — LACTULOSE 30 ML: 10 SOLUTION ORAL at 08:05

## 2025-05-10 RX ADMIN — OCTREOTIDE ACETATE 50 MCG/HR: 200 INJECTION, SOLUTION INTRAVENOUS; SUBCUTANEOUS at 14:37

## 2025-05-10 RX ADMIN — GABAPENTIN 200 MG: 100 CAPSULE ORAL at 17:00

## 2025-05-10 RX ADMIN — FUROSEMIDE 40 MG: 40 TABLET ORAL at 06:25

## 2025-05-10 RX ADMIN — POLYETHYLENE GLYCOL-3350 AND ELECTROLYTES 4 L: 236; 6.74; 5.86; 2.97; 22.74 POWDER, FOR SOLUTION ORAL at 17:03

## 2025-05-10 RX ADMIN — RIFAXIMIN 550 MG: 550 TABLET ORAL at 06:25

## 2025-05-10 RX ADMIN — GABAPENTIN 200 MG: 100 CAPSULE ORAL at 06:25

## 2025-05-10 RX ADMIN — PANTOPRAZOLE SODIUM 40 MG: 40 INJECTION, POWDER, FOR SOLUTION INTRAVENOUS at 17:04

## 2025-05-10 RX ADMIN — PANTOPRAZOLE SODIUM 40 MG: 40 INJECTION, POWDER, FOR SOLUTION INTRAVENOUS at 06:26

## 2025-05-10 ASSESSMENT — ENCOUNTER SYMPTOMS
CHILLS: 1
SHORTNESS OF BREATH: 1
MYALGIAS: 1
ABDOMINAL PAIN: 1
FEVER: 0
COUGH: 1
PSYCHIATRIC NEGATIVE: 1
BLOOD IN STOOL: 1
DIZZINESS: 1
WEAKNESS: 1
NAUSEA: 1
EYES NEGATIVE: 1
VOMITING: 1

## 2025-05-10 ASSESSMENT — SOCIAL DETERMINANTS OF HEALTH (SDOH)
WITHIN THE PAST 12 MONTHS, YOU WORRIED THAT YOUR FOOD WOULD RUN OUT BEFORE YOU GOT THE MONEY TO BUY MORE: NEVER TRUE
WITHIN THE PAST 12 MONTHS, THE FOOD YOU BOUGHT JUST DIDN'T LAST AND YOU DIDN'T HAVE MONEY TO GET MORE: NEVER TRUE
IN THE PAST 12 MONTHS, HAS THE ELECTRIC, GAS, OIL, OR WATER COMPANY THREATENED TO SHUT OFF SERVICE IN YOUR HOME?: NO

## 2025-05-10 ASSESSMENT — PAIN DESCRIPTION - PAIN TYPE
TYPE: ACUTE PAIN
TYPE: CHRONIC PAIN

## 2025-05-10 ASSESSMENT — FIBROSIS 4 INDEX: FIB4 SCORE: 6.14

## 2025-05-10 NOTE — CARE PLAN
"The patient is Stable - Low risk of patient condition declining or worsening    Shift Goals  Clinical Goals: Patient will be consulted by GI. Patient's pain will remain well controlled throughout shift. Patient's skin will remain free from breakdown.  Patient Goals: \"Get to eat soon.\"  Family Goals: EDMUND    Progress made toward(s) clinical / shift goals:  Patient provided a verbal discussion related to POC and verbalized understanding. Discussed plans for EGD/Colonoscopy scheduled for tomorrow 5/11/2025 per GI. Patient verbalized understanding and acknowledged she will be NPO at midnight. Patient has remained hemodynamically stable throughout shift with hemoglobin levels trending upward. No active signs of bleeding or blood in stool currently. Patient educated to use call light for assistance. Fall precautions in place. Hourly rounding in place. Pt is able to verbalize pain on a scale of 1-10 and is able to verbalize comfort goal. Pain management plan followed and pt educated on nonpharmacologic and pharmacological comfort measures. Proper hand hygiene before and after patient care to ensure patient will remain free from infection. Patient encouraged to turn and provided assistance with turns PRN to prevent skin breakdown.     Patient is not progressing towards the following goals: N/A      "

## 2025-05-10 NOTE — PROGRESS NOTES
Patient is not on schedule for EGD/Colonoscopy at this time. Okay for patient to eat per Dr. Gray. Patient on clear liquid diet.

## 2025-05-10 NOTE — PROGRESS NOTES
Bedside report received and patient care assumed. Pt is resting in bed, A&O4, with no complaints of pain, and is on room air. Tele box on. All fall precautions are in place, belongings at bedside table.  Pt was updated on POC, no questions or concerns. Pt educated on use of call light for assistance.

## 2025-05-10 NOTE — THERAPY
Occupational Therapy   Initial Evaluation     Patient Name: April Alicia  Age:  65 y.o., Sex:  female  Medical Record #: [id number]  Today's Date: 5/9/2025     Precautions  Precautions: Fall Risk  Comments: Hx L AKA    Assessment    Patient is a very pleasant 65 y.o. female with a PMHx significant for A-fib, alcoholic cirrhosis, HTN, migraine, insulin dependent DM, DLS, GERD, chronic pain, NIDDM 2, seizure disorder, asthma, status post left AKA. Presented to the hospital 5/6/2025 from [facility] with fevers, chills, cough. Admitted with sepsis and GIB. Pt greeted and seen for OT eval and treatment. Required CGA - min A for bed mobility, min A - mod A for functional mobility and mod A -max A for ADLs. Began patient education on role of OT, energy conservation techniques, appropriate AE/DME to utilize during ADL routine, energy conservation strategies, compensatory strategies during ADLs and functional txfs to maximize independence and safety, and importance of frequent EOB.OOB activity . Patient  verbalized understanding. Currently limited by impaired cognition/safety awareness, AROM, strength, balance, activity tolerance, functional mobility and pain which are currently affecting patients ability to complete ADL/IADLs at baseline. Will continue to follow.    Plan    Occupational Therapy Initial Treatment Plan   Treatment Interventions: (P) Self Care / Activities of Daily Living, Adaptive Equipment, Neuro Re-Education / Balance, Therapeutic Exercises, Therapeutic Activity, Family / Caregiver Training  Treatment Frequency: (P) 3 Times per Week  Duration: (P) Until Therapy Goals Met    DC Equipment Recommendations: (P) Unable to determine at this time  Discharge Recommendations: (P) Other - (Return to LTAC)     Objective     05/09/25 1609   Prior Living Situation   Prior Services Continuous (24 Hour) Care Giving Per Service   Housing / Facility [facility] ([facility])   Comments Pt states she has lived at  the rehab facility for the past year but would really like to get home.   Prior Level of ADL Function   Self Feeding Independent   Grooming / Hygiene Independent   Bathing Requires Assist   Dressing Independent   Toileting Requires Assist   Comments Per pt report   Prior Level of IADL Function   Medication Management Requires Assist   Laundry Dependent   Kitchen Mobility Requires Assist   Finances Requires Assist   Home Management Dependent   Shopping Dependent   History of Falls   History of Falls No   Precautions   Precautions Fall Risk   Comments Hx L AKA   Vitals   Vitals Comments VSS throughout with no c/o dizziness or light headedness   Pain 0 - 10 Group   Therapist Pain Assessment During Activity;Post Activity Pain Same as Prior to Activity;Nurse Notified  (Increased pain with mobility; not quantified.)   Cognition    Cognition / Consciousness X   Speech/ Communication Delayed Responses;Dysarthric;Hard of Hearing   Level of Consciousness Alert   Safety Awareness Impaired;Impulsive   New Learning Impaired   Comments Very pleasant and participatory. Max multimodal cueing needed for safety and sequencing.   Passive ROM Upper Body   Passive ROM Upper Body WDL   Active ROM Upper Body   Active ROM Upper Body  X   Dominant Hand Right   Comments L shoulder limited by pain; reports baseline   Strength Upper Body   Upper Body Strength  X   Gross Strength Generalized Weakness, Equal Bilaterally.    Coordination Upper Body   Coordination WDL   Balance Assessment   Sitting Balance (Static) Fair +   Sitting Balance (Dynamic) Fair   Standing Balance (Static) Fair -   Standing Balance (Dynamic) Poor +   Weight Shift Sitting Fair   Comments HHA in standing   Bed Mobility    Supine to Sit Contact Guard Assist   Sit to Supine Contact Guard Assist   Scooting Minimal Assist   Rolling Contact Guard Assist   Comments HOB slightly elevated with use of rail   ADL Assessment   Eating Supervision   Grooming Supervision;Seated   Upper  "Body Dressing Moderate Assist   Lower Body Dressing Maximal Assist   Toileting Maximal Assist  (pericare following BM on BSC)   Functional Mobility   Sit to Stand Minimal Assist   Bed, Chair, Wheelchair Transfer Moderate Assist   Toilet Transfers Moderate Assist  (BSC)   Transfer Method Stand Pivot   Mobility Bed > BSC > bed   Edema / Skin Assessment   Edema / Skin  X   Comments RLE edema   Activity Tolerance   Comments Limited by weakness, fatigue and pain   Patient / Family Goals   Patient / Family Goal #1 \"to get strong enough to go home\"   Short Term Goals   Short Term Goal # 1 Pt will be able to complete ADL/toilet transfers with SBA   Short Term Goal # 2 Pt will be able to complete toileting ADLs with SBA   Short Term Goal # 3 Pt will be able to complete LB dressing with SBA and AE PRN   Education Group   Education Provided Energy Conservation;Home Safety;Transfers;Role of Occupational Therapist;Activities of Daily Living;Adaptive Equipment;Pathology of bedrest   Role of Occupational Therapist Patient Response Patient;Acceptance;Explanation;Verbal Demonstration   Energy Conservation Patient Response Patient;Acceptance;Explanation;Verbal Demonstration   Home Safety Patient Response Patient;Acceptance;Explanation;Verbal Demonstration   Transfers Patient Response Patient;Acceptance;Explanation;Verbal Demonstration   ADL Patient Response Patient;Acceptance;Explanation;Verbal Demonstration   Adaptive Equipment Patient Response Patient;Acceptance;Explanation;Verbal Demonstration   Pathology of Bedrest Patient Response Patient;Acceptance;Explanation;Verbal Demonstration   Occupational Therapy Initial Treatment Plan    Treatment Interventions Self Care / Activities of Daily Living;Adaptive Equipment;Neuro Re-Education / Balance;Therapeutic Exercises;Therapeutic Activity;Family / Caregiver Training   Treatment Frequency 3 Times per Week   Duration Until Therapy Goals Met   Problem List   Problem List Decreased Active " Daily Living Skills;Decreased Homemaking Skills;Decreased Upper Extremity Strength Right;Decreased Upper Extremity Strength Left;Decreased Upper Extremity AROM Left;Decreased Functional Mobility;Decreased Activity Tolerance;Safety Awareness Deficits / Cognition;Impaired Postural Control / Balance   Anticipated Discharge Equipment and Recommendations   DC Equipment Recommendations Unable to determine at this time   Discharge Recommendations Other -  (Return to LTAC)   Interdisciplinary Plan of Care Collaboration   IDT Collaboration with  Nursing   Patient Position at End of Therapy In Bed;Bed Alarm On;Call Light within Reach;Tray Table within Reach;Phone within Reach  (RN and CNA in room)   Collaboration Comments OT report and recs; RN updated   Session Information   Date / Session Number  5/9, 1 (1/3, 5/15)

## 2025-05-10 NOTE — H&P (VIEW-ONLY)
..GASTROENTEROLOGY CONSULTATION    PATIENT NAME: April Alicia  : 1960  CSN: [id number]  MRN:  [id number]     CONSULTATION DATE:  5/10/2025    PRIMARY CARE PROVIDER:  Anum Gatica M.D.      REASON FOR CONSULT:  Hematochezia and melena    CONSULT REQUESTED BY:  Dr. Shashank Gray    HISTORY OF PRESENT ILLNESS:  April Alicia is a 65 y.o. female with history of hepatitis C (treated 3 years ago) and fatty liver induced cirrhosis, hepatic encephalopathy on lactulose and rifaxamin, DM2, COPD, atrial fibrillation, and left BKA who resides at [facility] who came to the ED on 2025 with flu like symptoms. She is being treated for pseudomonas bacteremia with IV Zosyn. While inpatient, she developed bloody bowel movements which she reported had been going on for a week with lower abdominal pain. Her hemoglobin was 6.7 on  and she received one unit PRBC.     Patient describes that she has noticed black stools for the past several weeks with associated diffuse abdominal tenderness. She reports bloating but no weight loss. She occasionally has dysphagia. She began noticing blood in her stools about a week ago. She does not take blood thinners or NSAIDs. She is on lasix but not spironolactone.     VSS. Hgb 8.0. Labs pending for today  CT on admission with liver cirrhosis, abdominal wall varices with recanalization of the umbilical vein, splenomegaly, moderate scattered abdominal ascites. Cardiomegaly with likely PAH, atherosclerosis, and bilateral pleural effusions.     GI history:  10/2015 - Colonoscopy with polyp removal  2024: EGD with portal hypertensive gastropathy    PAST MEDICAL HISTORY:  Past Medical History:   Diagnosis Date    Abnormal finding of lung 2022    Acute exacerbation of chronic obstructive pulmonary disease (COPD) (HCC) 2020    Alcoholic cirrhosis (HCC)     Arthritis     OA in knees    ASTHMA     Atrial fibrillation (HCC)     Dry eyes 2017    Edentulous      upper and lower dentures    Emphysema of lung (HCC)     H/O: hysterectomy 12/05/2019    Heart burn     left knee    Hepatic encephalopathy (HCC) 12/27/2022    Hepatitis C infection 07/28/2022    History of rheumatic fever 09/04/2017    Hypertension     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HCC)     Recurrent, L knee, x4 with surgical washout    Medication overuse headache 08/15/2017    Mitral regurgitation     Pancytopenia (HCC) 07/26/2022    Pneumonia 02/2020    Primary osteoarthritis of left knee 08/25/2020    Prosthetic joint infection (HCC) 2018    Right knee after total knee    Protein-calorie malnutrition, severe (HCC) 08/01/2022    Right leg DVT (HCC) 2018    acute, resolved    Seizure disorder (HCC)     Septic arthritis of knee, left (HCC) 07/19/2022    Septic arthritis of shoulder, left (HCC) 07/17/2022    Septic shock due to Pseudomonas species (HCC) 10/30/2023    Type 2 diabetes mellitus ([facility]) 12/27/2022       PAST SURGICAL HISTORY:  Past Surgical History:   Procedure Laterality Date    NE UPPER GI ENDOSCOPY,DIAGNOSIS N/A 11/27/2024    Procedure: GASTROSCOPY;  Surgeon: Jose Guadalupe Velázquez M.D.;  Location: SURGERY SAME DAY [facility];  Service: Gastroenterology    KNEE AMPUTATION ABOVE Left 1/16/2024    Procedure: LEFT ABOVE KNEE AMPUTATION;  Surgeon: Obdulio Gray M.D.;  Location: SURGERY [facility];  Service: Orthopedics    PB TOTAL KNEE ARTHROPLASTY Left 10/30/2023    Procedure: LEFT TOTAL KNEE ARTHROPLASTY EXPLANTATION, INSERTION OF ANTIBIOTIC SPACER, AND APPLICATION OF INCISIONAL WOUND VACUUM;  Surgeon: Wild Luz M.D.;  Location: SURGERY [facility];  Service: Orthopedics    PB REVISE KNEE JOINT REPLACE,ALL PARTS Left 12/28/2022    Procedure: REVISION, TOTAL ARTHROPLASTY, KNEE, ALL COMPONENTS-LEFT;  Surgeon: Wild Luz M.D.;  Location: SURGERY [facility];  Service: Orthopedics    WOUND IRRIGATION & DEBRIDEMENT Left 12/28/2022    Procedure: IRRIGATION AND DEBRIDEMENT, WOUND;  Surgeon: Wild VALERIO  DENNIS Luz;  Location: SURGERY [facility];  Service: Orthopedics    PB REVISE KNEE JOINT REPLACE,ALL PARTS Left 7/19/2022    Procedure: REVISION, TOTAL ARTHROPLASTY, KNEE, ALL COMPONENTS - ANTIBIOTIC SPACER;  Surgeon: Wild Luz M.D.;  Location: SURGERY [facility];  Service: Orthopedics    WOUND IRRIGATION & DEBRIDEMENT Left 7/17/2022    Procedure: IRRIGATION AND DEBRIDEMENT, SHOULDER;  Surgeon: Obdulio Gray M.D.;  Location: SURGERY [facility];  Service: Orthopedics    PB TOTAL KNEE ARTHROPLASTY Left 8/24/2020    Procedure: ARTHROPLASTY, KNEE, TOTAL;  Surgeon: Víctor Faustin M.D.;  Location: SURGERY [facility];  Service: Orthopedics    KNEE ARTHROPLASTY TOTAL Right 2018    IRRIGATION & DEBRIDEMENT ORTHO Right 9/3/2017    Procedure: IRRIGATION & DEBRIDEMENT ORTHO, POLY EXCHANGE;  Surgeon: Jerome Russell M.D.;  Location: SURGERY [facility];  Service: Orthopedics    COLONOSCOPY WITH CLIPPING  10/28/2015    Procedure: COLONOSCOPY WITH CLIPPING;  Surgeon: Ruben Colon M.D.;  Location: ENDOSCOPY [facility] [place];  Service:     COLONOSCOPY WITH SCLEROTHERAPY  10/28/2015    Procedure: COLONOSCOPY WITH SCLEROTHERAPY;  Surgeon: Ruben Colon M.D.;  Location: [facility];  Service:     COLONOSCOPY WITH TATTOOING  10/28/2015    Procedure: COLONOSCOPY WITH TATTOOING;  Surgeon: Ruben Colon M.D.;  Location: [facility];  Service:     GYN SURGERY  2003    hysteroscoopy    GYN SURGERY  1982    tubal ligation        CURRENT MEDS:  Current Facility-Administered Medications   Medication Dose Route Frequency Provider Last Rate Last Admin    midodrine (Proamatine) tablet 5 mg  5 mg Oral TID WITH MEALS KATE CabreraN.P.   5 mg at 05/10/25 0759    pantoprazole (Protonix) injection 40 mg  40 mg Intravenous BID Shashank Gray M.D.   40 mg at 05/10/25 0626    octreotide (SandoSTATIN) 1,250 mcg in  mL Infusion  50 mcg/hr Intravenous Continuous Shashank Gray M.D. 10  mL/hr at 05/09/25 1721 50 mcg/hr at 05/09/25 1721    NS infusion   Intravenous Continuous Shashank Gray M.D. 30 mL/hr at 05/08/25 1145 Restarted at 05/08/25 1145    acetaminophen (Tylenol) tablet 650 mg  650 mg Oral Q6HRS PRN Edmund Malone M.D.   650 mg at 05/08/25 2206    ondansetron (Zofran) syringe/vial injection 4 mg  4 mg Intravenous Q4HRS PRN Edmund Malone M.D.        Or    ondansetron (Zofran ODT) dispertab 4 mg  4 mg Oral Q4HRS PRN Edmund Malone M.D.        albuterol inhaler 2 Puff  2 Puff Inhalation Q4HRS PRN Edmund Malone M.D.        atorvastatin (Lipitor) tablet 20 mg  20 mg Oral Nightly Edmund Malone M.D.   20 mg at 05/09/25 2127    furosemide (Lasix) tablet 40 mg  40 mg Oral DAILY Shashank Gray M.D.   40 mg at 05/10/25 0625    gabapentin (Neurontin) capsule 200 mg  200 mg Oral BID Edmund Malone M.D.   200 mg at 05/10/25 0625    lactulose 20 GM/30ML solution 30 mL  30 mL Oral 4X/DAY Shashank Gray M.D.   30 mL at 05/10/25 0805    oxyCODONE immediate-release (Roxicodone) tablet 5 mg  5 mg Oral Q6HRS PRN Edmund Malone M.D.   5 mg at 05/10/25 0408    riFAXIMin (Xifaxan) tablet 550 mg  550 mg Oral BID Shashank Gray M.D.   550 mg at 05/10/25 0625    insulin lispro (HumaLOG,AdmeLOG) subcutaneous injection  1-6 Units Subcutaneous 4X/DAY ACHS Shashank Gray M.D.   1 Units at 05/08/25 1143    And    dextrose 50 % (D50W) injection 25 g  25 g Intravenous Q15 MIN PRN Shashank Gray M.D.        Metoprolol Tartrate (Lopressor) injection 5 mg  5 mg Intravenous Q5 MIN PRN Shashank Gray M.D.   5 mg at 05/07/25 1818    piperacillin-tazobactam (Zosyn) 4.5 g in  mL IVPB  4.5 g Intravenous Q8HRS Shashank Gray M.D. 25 mL/hr at 05/10/25 0625 4.5 g at 05/10/25 0625        ALLERGIES:  Allergies   Allergen Reactions    Meropenem Swelling     Pt had rxt to meropenem June 2024 (marked tongue swelling and right facial/periorbital edema)    Tuberculin Tests Unspecified     Per MAR from  [facility]       SOCIAL HISTORY:  Social History     Socioeconomic History    Marital status: Single     Spouse name: Not on file    Number of children: 2    Years of education: Not on file    Highest education level: 11th grade   Occupational History    Occupation: CNA   Tobacco Use    Smoking status: Former     Current packs/day: 0.00     Types: Cigarettes     Quit date: 2016     Years since quittin.3    Smokeless tobacco: Former     Quit date: 2021    Tobacco comments:     a few a day when I can   Vaping Use    Vaping status: Never Used   Substance and Sexual Activity    Alcohol use: Not Currently     Comment: hx of AUD    Drug use: Not Currently    Sexual activity: Not Currently     Partners: Male     Birth control/protection: Post-Menopausal     Comment: Has boyfriend, not currently sexually active   Other Topics Concern    Not on file   Social History Narrative    Not on file     Social Drivers of Health     Financial Resource Strain: Low Risk  (2020)    Overall Financial Resource Strain (CARDIA)     Difficulty of Paying Living Expenses: Not hard at all   Food Insecurity: No Food Insecurity (2025)    Hunger Vital Sign     Worried About Running Out of Food in the Last Year: Never true     Ran Out of Food in the Last Year: Never true   Transportation Needs: No Transportation Needs (2025)    PRAPARE - Transportation     Lack of Transportation (Medical): No     Lack of Transportation (Non-Medical): No   Physical Activity: Not on file   Stress: Not on file   Social Connections: Not on file   Intimate Partner Violence: Not At Risk (2025)    Humiliation, Afraid, Rape, and Kick questionnaire     Fear of Current or Ex-Partner: No     Emotionally Abused: No     Physically Abused: No     Sexually Abused: No   Housing Stability: Low Risk  (2025)    Housing Stability Vital Sign     Unable to Pay for Housing in the Last Year: No     Number of Times Moved in the Last Year: 0     Homeless in  "the Last Year: No       FAMILY HISTORY:  Family History   Problem Relation Age of Onset    Diabetes Mother     Seizures Father     Heart Attack Father 45    Diabetes Brother     Alcohol abuse Brother     Dementia Brother     Diabetes Brother         REVIEW OF SYSTEMS:  General ROS: Negative for - chills, fever, night sweats or weight loss.  HEENT ROS: Hearing loss  Respiratory ROS: Negative for - cough or shortness of breath.  Cardiovascular ROS:  Negative for - chest pain or palpitations.  Gastrointestinal ROS: As per the history of present illness.  Genito-Urinary ROS: Negative  Musculoskeletal ROS: Negative.  Neurological ROS: Negative  Skin ROS: negative  Hematology ROS: negative  Endocrinology ROS: Negative    PHYSICAL EXAM:  VITALS: /71   Pulse 61   Temp 37.1 °C (98.8 °F) (Temporal)   Resp 17   Ht 1.702 m (5' 7\")   Wt 101 kg (222 lb 10.6 oz)   LMP 06/02/2008   SpO2 97%   BMI 34.87 kg/m²   GEN:  April Alicia is an obese 65 y.o. female in no acute distress.  HEENT: Hard of hearing. Mucous membranes pink and moist.  Sclera anicteric.  Edentulous  NECK:    Neck supple without lymphadenopathy or thyromegaly.  LUNGS: Diminished throughout  HEART: Irregular rate and rhythm. S1 and S2 normal. No murmurs, gallops  ABD:  Distended, diffusely tender to palpation. +bowel sounds   RECTAL: Not done at this time.  EXT:  Left AKA, 1+ RLE edema skin with graft   SKIN:  Pink, warm, dry.  NEURO: Grossly intact, A/OR.    LABS:  Recent Labs     05/07/25  1441 05/08/25  0239 05/09/25  0112   WBC 8.7 11.5* 10.2   MCV 90.7 91.0 93.5     Recent Labs     05/07/25  1441 05/08/25  0239 05/09/25  0112   GLUCOSE 86 148* 140*   BUN 42* 44* 49*   CO2 16* 15* 17*     Lab Results   Component Value Date    INR 1.43 (H) 05/06/2025    INR 1.39 (H) 01/15/2025    INR 1.47 (H) 12/26/2024     No components found for: \"ALT\", \"AST\", \"GGT\", \"ALKPHOS\"  No results found for: \"BILINEO\"      @LASTIMGCAFUNMI([id number])@ "     @[facility]([id number])@       IMPRESSION/PLAN:  Hematochezia  Acute blood loss anemia  Hepatitis C (treated) and fatty liver induced cirrhosis with portal hypertension  Abdominal ascites - not enough fluid for paracentesis  Hepatic encephalopathy - controlled  Splenomegaly and thrombocytopenia  Elevated INR   Hypoalbuminemia  Iron deficiency  Cardiomegaly with pulmonary hypertension, RVSP 25 mmHg on echo Jan 2024    Recommendations:  Trend H/H and transfuse for hgb <7  Continue lactulose and rifaxamin   Continue lasix 40 mg, recommend adding spironolactone 100 mg for ratio of 40/100  Check INR and await labs to calculate MELD  Clear liquid diet today  GoLytely at 1600  NPO at midnight  EGD and colonoscopy tomorrow     Discussed with patient, RN, Dr. Gray, Dr. Melania Aquino, DNP,  APRN  Gastroenterology

## 2025-05-10 NOTE — PROGRESS NOTES
[facility] Medicine Daily Progress Note    Date of Service  5/10/2025    Chief Complaint  April Alicia is a 65 y.o. female admitted 5/6/2025 with fevers, chills, cough.    Hospital Course  Patient is a 65-year-old female with past medical history of alcoholic cirrhosis, migraine, HTN, paroxysmal atrial fibrillation not on anticoagulation, GERD, chronic pain, NIDDM 2, seizure disorder, asthma, status post left AKA that presents with 1 day history of malaise, dry cough, fever, chills.     Patient states that they have been having a dry cough, full body soreness, fever, chills since yesterday.  Denies any lower urinary tract symptoms.  States that they would always have abdominal pain which is unchanged.     In the ED, BP 90s to 130s/50s to 90s, HR 60s to 150s, RR 11-21, Tmax 100.8 F, saturating well on room air.  Blood cultures x 2 drawn in the ED.  Received LR 1 L bolus x 1, diltiazem 18.6 mg IV x 1.  WBC 6.2, hemoglobin 7.8, PLT 86, bicarb 17, anion gap 9, BUN 45, creatinine 1.35, alk phos 211, lactic acid 1.6, troponin 25>> 19, NT proBNP 894.  COVID/flu/RSV negative.  UA with trace ketones, large occult blood, negative nitrite, moderate leukocyte esterase, 21-50 WBC, 21-50 RBC, many bacteria.  Chest x-ray with trace bilateral pleural effusions.  CT chest abdomen pelvis with contrast showing cirrhosis with recannulization of the umbilical vein and prominent anterior abdominal wall varices compatible with portal hypertension, splenomegaly, moderate scattered abdominal ascites, small bilateral pleural effusions left greater than right, prominent main pulmonary artery suggesting pulmonary arterial hypertension.  EKG atrial fibrillation , QTc 484, with diffuse T wave flattening, similar to previous.    Interval Problem Update  5/7 patient reports symptoms of subjective fevers, chills, nausea, vomiting, abdominal pain, shallow breathing. Reports feeling very tired and fatigued and unable to ambulate.  She  denies any dysuria.  She is noted to be anemic with a hemoglobin of 7.8 and thrombocytopenic with a platelet count of 86.  Monitor CBC and transfuse for hemoglobin less than 7 or platelets less than 10 or if she develops active bleeding.  Ultrasound-guided paracentesis is pending.  Continue empiric IV ceftriaxone and Flagyl    5/8 Patient as blood cultures positive for Pseudomonas.  Switch antibiotics to IV Zosyn.  Likely source is intra-abdominal.  She does report some lower abdominal pain.  CT chest, abdomen and pelvis revealed evidence of cirrhosis and portal hypertension with scattered abdominal ascites and small bilateral pleural effusions.  Will consult ID.  Ultrasound reveals small volume ascites and no safe access window for paracentesis.    5/9 Patient reported bloody bowel movements that she states have been going on for the past week.  Her hemoglobin dropped to 6.7 this morning and she received a unit of PRBC.  Platelets have been low at 60.  Avoid aspirin or anticoagulation.  I will start the patient on IV Protonix.  I have consulted and discussed the case with GI who will evaluate the patient and plan for EGD/colonoscopy tomorrow.  Continue cardiac monitoring.  I also discussed the case with ID and at this point we will continue with IV Zosyn for treatment of her Pseudomonas bacteremia.    5/10 patient continues to report some lower abdominal pain.  Her blood pressure is improved to 133/82.  She has been bradycardic on octreotide therefore will discontinue octreotide.  Continue cardiac monitoring.  Her hemoglobin returned at 8.9.  Platelets are low at 72.  I consulted and discussed the case with GI, plan for EGD and colonoscopy tomorrow.  N.p.o. at midnight.  C. difficile was negative.  Pseudomonas is pansensitive, continue IV Zosyn for now and may transition to Cipro on discharge with stop date of 5/14/2025 per ID.    I have discussed this patient's plan of care and discharge plan at IDT rounds today  with Case Management, Nursing, Nursing leadership, and other members of the IDT team.    Consultants/Specialty  None    Code Status  Full Code    Disposition  Not medically cleared  I have placed the appropriate orders for post-discharge needs.    Review of Systems  Review of Systems   Constitutional:  Positive for chills and malaise/fatigue. Negative for fever.   HENT: Negative.     Eyes: Negative.    Respiratory:  Positive for cough and shortness of breath.    Cardiovascular:  Negative for chest pain.   Gastrointestinal:  Positive for abdominal pain, blood in stool, melena, nausea and vomiting.   Genitourinary:  Negative for dysuria.   Musculoskeletal:  Positive for myalgias.   Skin: Negative.    Neurological:  Positive for dizziness and weakness (Generalized).   Endo/Heme/Allergies: Negative.    Psychiatric/Behavioral: Negative.          Physical Exam  Temp:  [36.7 °C (98.1 °F)-37.1 °C (98.8 °F)] 36.9 °C (98.4 °F)  Pulse:  [55-73] 55  Resp:  [15-20] 17  BP: (107-133)/(60-82) 133/82  SpO2:  [95 %-98 %] 98 %    Physical Exam  Vitals and nursing note reviewed.   Constitutional:       Appearance: She is obese. She is ill-appearing.   HENT:      Head: Normocephalic and atraumatic.      Nose: Nose normal.      Mouth/Throat:      Mouth: Mucous membranes are dry.   Eyes:      Pupils: Pupils are equal, round, and reactive to light.   Cardiovascular:      Rate and Rhythm: Normal rate. Rhythm irregular.      Pulses: Normal pulses.      Heart sounds: Normal heart sounds.   Pulmonary:      Effort: Pulmonary effort is normal.      Breath sounds: No rhonchi.   Abdominal:      General: There is distension.      Tenderness: There is abdominal tenderness.   Musculoskeletal:         General: Normal range of motion.      Right lower leg: Edema present.      Left lower leg: Edema present.   Skin:     General: Skin is warm.      Capillary Refill: Capillary refill takes less than 2 seconds.   Neurological:      General: No focal  deficit present.      Mental Status: She is alert.   Psychiatric:         Behavior: Behavior normal.         Fluids    Intake/Output Summary (Last 24 hours) at 5/10/2025 1533  Last data filed at 5/10/2025 1300  Gross per 24 hour   Intake 2350 ml   Output 1600 ml   Net 750 ml        Laboratory  Recent Labs     05/08/25  0239 05/09/25  0112 05/09/25  1534 05/09/25  2208 05/10/25  0616 05/10/25  1413   WBC 11.5* 10.2  --   --   --  7.3   RBC 2.45* 2.32*  --   --   --  3.12*   HEMOGLOBIN 7.3* 6.7*   < > 8.0* 8.0* 8.9*   HEMATOCRIT 22.3* 21.7*  --  25.9* 26.1* 29.0*   MCV 91.0 93.5  --   --   --  92.9   MCH 29.8 28.9  --   --   --  28.5   MCHC 32.7 30.9*  --   --   --  30.7*   RDW 52.8* 54.7*  --   --   --  54.6*   PLATELETCT 70* 60*  --   --   --  72*   MPV 11.9 12.3  --   --   --  12.8    < > = values in this interval not displayed.     Recent Labs     05/08/25 0239 05/09/25  0112 05/10/25  1413   SODIUM 135 136 137   POTASSIUM 4.7 4.3 4.5   CHLORIDE 111 113* 114*   CO2 15* 17* 15*   GLUCOSE 148* 140* 125*   BUN 44* 49* 43*   CREATININE 1.46* 1.47* 1.42*   CALCIUM 7.5* 7.5* 7.6*     Recent Labs     05/10/25  1413   INR 1.53*               Imaging  US-ABDOMEN LTD (SOFT TISSUE)   Final Result         1. Small volume ascites. No safe access window for paracentesis at this time.         CT-CHEST,ABDOMEN,PELVIS WITH   Final Result         1.  Cirrhosis with recanalization of the umbilical vein and prominent anterior abdominal wall varices, compatible with portal hypertension.   2.  Splenomegaly.   3.  Moderate scattered abdominal ascites.   4.  Small bilateral pleural effusions, left greater than right.   5.  Linear densities the bilateral lung bases favor changes of atelectasis.   6.  Cardiomegaly.   7.  Prominent main pulmonary artery, appearance suggests component of pulmonary arterial hypertension.   8.  Atherosclerosis and atherosclerotic coronary artery disease.      DX-CHEST-LIMITED (1 VIEW)   Final Result          1.  No acute cardiopulmonary disease.   2.  Trace bilateral pleural effusions   3.  Cardiomegaly.   4.  Atherosclerosis.           Assessment/Plan  * Sepsis (HCC)- (present on admission)  Assessment & Plan  -SIRS criteria identified on my evaluation include: Fever, with temperature greater than 100.9 deg F, Tachycardia, with heart rate greater than 90 BPM, and Tachypnea, with respirations greater than 20 per minute  -Clinical indicators of end organ dysfunction include Hypotension with systolic blood pressure less than 90 or MAP less than 65 and Hypotension with decrease in systolic blood pressure of more than 40mmHg  -Source is intra-abdominal versus respiratory  -Sepsis protocol initiated  -Crystalloid Fluid Administration: Fluid resuscitation ordered per standard protocol - 30 mL/kg per current or ideal body weight  -Start IV Zosyn  - Blood culture positive for Pseudomonas, follow sensitivity report  -Order full respiratory panel      GI bleed- (present on admission)  Assessment & Plan  In the setting of alcohol cirrhosis and thrombocytopenia  Continue IV Protonix  Continue close cardiac monitoring  Patient has been started on IV fluid hydration with ns  NPO at midnight  Monitor H&H every 8 hours, transfuse for hemoglobin less than 7  INR abnormal, will give vitamin K and assess response   I have consulted GI, plan for EGD and colonoscopy tomorrow      DM2 (diabetes mellitus, type 2) ([facility])- (present on admission)  Assessment & Plan  - Hypoglycemia protocol  - Diabetic diet  - SSI    Elevated troponin- (present on admission)  Assessment & Plan  -Troponin 25>> 19.  Likely nonischemic myocardial injury in the setting of sepsis.    -EKG without ST changes.  Patient denies chest pain.    Hx of AKA (above knee amputation), left ([facility])- (present on admission)  Assessment & Plan  -History of left AKA 01/2024.    Seizures ([facility])- (present on admission)  Assessment & Plan  - Continue home gabapentin    Atrial fibrillation  with RVR (HCC)- (present on admission)  Assessment & Plan  -A-fib with RVR up to the 150s on admission.  Likely in the setting of sepsis. EKG atrial fibrillation , QTc 484, with diffuse T wave flattening, similar to previous.  -Received diltiazem 18.6 mg IV x 1 in the ED  -Hold off on any other AV bibi blockade at this time in the setting of sepsis with hypotension  -Currently rate controlled.  Monitor on telemetry.    ELIZABETH (acute kidney injury) ([facility])- (present on admission)  Assessment & Plan  -Creatinine 1.35, baseline 0.7-0.9.  Likely hypoperfusion in the setting of sepsis complicated by atrial fibrillation with RVR.  -Daily BMP  -Avoid nephrotoxic medications  -Renally dose medications    Normal anion gap metabolic acidosis- (present on admission)  Assessment & Plan  -Bicarb 17 anion gap 9  -Daily BMP    Bacteremia due to Pseudomonas- (present on admission)  Assessment & Plan  Continue IV Zosyn 4.5 g with end date of 5/14  ID consulted  Likely source is intra-abdominal  Unable to do paracentesis due to small volume ascites      Mild intermittent asthma without complication- (present on admission)  Assessment & Plan  -No sign of exacerbation  -Continue home albuterol as needed    Chronic pain syndrome- (present on admission)  Assessment & Plan  - Continue home oxycodone and gabapentin    GERD (gastroesophageal reflux disease)- (present on admission)  Assessment & Plan  - Continue home PPI    Normocytic anemia- (present on admission)  Assessment & Plan  -Hemoglobin 7.8, around baseline ranges from 7-10.  Likely in the setting of cirrhosis.  No sign of acute bleed.  -Daily CBC    Asymptomatic bacteriuria- (present on admission)  Assessment & Plan  -Patient denies LUTS.  UA with trace ketones, large occult blood, negative nitrite, moderate leukocyte esterase, 21-50 WBC, 21-50 RBC, many bacteria.  - Although low suspicion for UTI, patient is on IV Zosyn for pseudomonas bacteremia     Dyslipidemia- (present on  admission)  Assessment & Plan  - Continue home atorvastatin    Hypertension- (present on admission)  Assessment & Plan  - Hold all antihypertensives and diuretics in the setting of sepsis    Thrombocytopenia (HCC)- (present on admission)  Assessment & Plan  -PLT 86, around baseline ranging from 40s to 100s.  Likely in the setting of cirrhosis.  No sign of acute bleed.  -Daily CBC    Alcoholic cirrhosis (HCC)- (present on admission)  Assessment & Plan  MELD 15  -CT showing cirrhosis with evidence of portal hypertension, moderate scattered abdominal ascites.  Quit drinking 10 years ago.  - Restart furosemide, lactulose and rifaximin           VTE prophylaxis: scd    I have performed a physical exam and reviewed and updated ROS and Plan today (5/10/2025). In review of yesterday's note (5/9/2025), there are no changes except as documented above.      I spent 52 minutes, reviewing the chart, obtaining and/or reviewing separately obtained history. Performing a medically appropriate examination and evaluation.  Counseling and educating the patient. Ordering and reviewing medications, tests, or procedures. Documenting clinical information in EPIC.  Discussing the case with ID and GI.  Independently interpreting results and communicating results to patient. Discussing future disposition of care with patient, RN and case management.

## 2025-05-10 NOTE — PROGRESS NOTES
Monitor Summary  Rhythm: Afib  Rate: 56-74   Ectopy: (R) pvc, (R) coup,   Measurements: -/.10/-  ---12 hr Chart Review---

## 2025-05-10 NOTE — CARE PLAN
The patient is Stable - Low risk of patient condition declining or worsening    Shift Goals  Clinical Goals: safety, IV abx, skin intergrity  Patient Goals: sleep  Family Goals: gisele    Progress made toward(s) clinical / shift goals:    Problem: Knowledge Deficit - Standard  Goal: Patient and family/care givers will demonstrate understanding of plan of care, disease process/condition, diagnostic tests and medications  Description: Target End Date:  1-3 days or as soon as patient condition allowsDocument in Patient Education1.  Patient and family/caregiver oriented to unit, equipment, visitation policy and means for communicating concern2.  Complete/review Learning Assessment3.  Assess knowledge level of disease process/condition, treatment plan, diagnostic tests and medications4.  Explain disease process/condition, treatment plan, diagnostic tests and medications  Outcome: Progressing     Problem: Urinary - Renal Perfusion  Goal: Ability to achieve and maintain adequate renal perfusion and functioning will improve  Description: Target End Date:  Prior to discharge or change in level of careDocument on I/O and Assessment flowsheet1.  Urine output will remain greater than 0.5ml/Kg/HR2.  Monitor amount and/or characteristics of urine per order/policy. Specific gravity per order/policy3.  Assess signs and symptoms of renal dysfunction  Outcome: Progressing     Problem: Skin Integrity  Goal: Skin integrity is maintained or improved  Description: Target End Date:  Prior to discharge or change in level of careDocument interventions on Skin Risk/Sincere flowsheet groups and corresponding LDA1.  Assess and monitor skin integrity, appearance and/or temperature2.  Assess risk factors for impaired skin integrity and/or pressures ulcers3.  Implement precautions to protect skin integrity in collaboration with interdisciplinary team4.  Implement pressure ulcer prevention protocol if at risk for skin breakdown5.  Confirm wound care  consult if at risk for skin breakdown6.  Ensure patient use of pressure relieving devices  (Low air loss bed, waffle overlay, heel protectors, ROHO cushion, etc)  Outcome: Progressing     Problem: Fall Risk  Goal: Patient will remain free from falls  Description: Target End Date:  Prior to discharge or change in level of careDocument interventions on the [facility] Fall Risk Assessment1.  Assess for fall risk factors2.  Implement fall precautions  Outcome: Progressing     Problem: Pain - Standard  Goal: Alleviation of pain or a reduction in pain to the patient’s comfort goal  Description: Target End Date:  Prior to discharge or change in level of careDocument on Vitals flowsheet1.  Document pain using the appropriate pain scale per order or unit policy2.  Educate and implement non-pharmacologic comfort measures (i.e. relaxation, distraction, massage, cold/heat therapy, etc.)3.  Pain management medications as ordered4.  Reassess pain after pain med administration per policy5.  If opiods administered assess patient's response to pain medication is appropriate per POSS sedation scale6.  Follow pain management plan developed in collaboration with patient and interdisciplinary team (including palliative care or pain specialists if applicable)  Outcome: Progressing

## 2025-05-10 NOTE — RESPIRATORY CARE
COPD EDUCATION by COPD CLINICAL EDUCATOR  5/10/2025 at 7:33 AM by Daniela Morris, RRT     Patient reviewed by COPD education team. Patient does not have a history or diagnosis of COPD and is a non-smoker.  PFT in EMR 2/20/2020: FEV1-118, FEV1/FVC Ratio 79.9 confirms no obstructive process -normal airflow.

## 2025-05-10 NOTE — CONSULTS
..GASTROENTEROLOGY CONSULTATION    PATIENT NAME: April Alicia  : 1960  CSN: [id number]  MRN:  [id number]     CONSULTATION DATE:  5/10/2025    PRIMARY CARE PROVIDER:  Anum Gatica M.D.      REASON FOR CONSULT:  Hematochezia and melena    CONSULT REQUESTED BY:  Dr. Shashank Gray    HISTORY OF PRESENT ILLNESS:  April Alicia is a 65 y.o. female with history of hepatitis C (treated 3 years ago) and fatty liver induced cirrhosis, hepatic encephalopathy on lactulose and rifaxamin, DM2, COPD, atrial fibrillation, and left BKA who resides at [facility] who came to the ED on 2025 with flu like symptoms. She is being treated for pseudomonas bacteremia with IV Zosyn. While inpatient, she developed bloody bowel movements which she reported had been going on for a week with lower abdominal pain. Her hemoglobin was 6.7 on  and she received one unit PRBC.     Patient describes that she has noticed black stools for the past several weeks with associated diffuse abdominal tenderness. She reports bloating but no weight loss. She occasionally has dysphagia. She began noticing blood in her stools about a week ago. She does not take blood thinners or NSAIDs. She is on lasix but not spironolactone.     VSS. Hgb 8.0. Labs pending for today  CT on admission with liver cirrhosis, abdominal wall varices with recanalization of the umbilical vein, splenomegaly, moderate scattered abdominal ascites. Cardiomegaly with likely PAH, atherosclerosis, and bilateral pleural effusions.     GI history:  10/2015 - Colonoscopy with polyp removal  2024: EGD with portal hypertensive gastropathy    PAST MEDICAL HISTORY:  Past Medical History:   Diagnosis Date    Abnormal finding of lung 2022    Acute exacerbation of chronic obstructive pulmonary disease (COPD) (HCC) 2020    Alcoholic cirrhosis (HCC)     Arthritis     OA in knees    ASTHMA     Atrial fibrillation (HCC)     Dry eyes 2017    Edentulous      upper and lower dentures    Emphysema of lung (HCC)     H/O: hysterectomy 12/05/2019    Heart burn     left knee    Hepatic encephalopathy (HCC) 12/27/2022    Hepatitis C infection 07/28/2022    History of rheumatic fever 09/04/2017    Hypertension     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HCC)     Recurrent, L knee, x4 with surgical washout    Medication overuse headache 08/15/2017    Mitral regurgitation     Pancytopenia (HCC) 07/26/2022    Pneumonia 02/2020    Primary osteoarthritis of left knee 08/25/2020    Prosthetic joint infection (HCC) 2018    Right knee after total knee    Protein-calorie malnutrition, severe (HCC) 08/01/2022    Right leg DVT (HCC) 2018    acute, resolved    Seizure disorder (HCC)     Septic arthritis of knee, left (HCC) 07/19/2022    Septic arthritis of shoulder, left (HCC) 07/17/2022    Septic shock due to Pseudomonas species (HCC) 10/30/2023    Type 2 diabetes mellitus ([facility]) 12/27/2022       PAST SURGICAL HISTORY:  Past Surgical History:   Procedure Laterality Date    KS UPPER GI ENDOSCOPY,DIAGNOSIS N/A 11/27/2024    Procedure: GASTROSCOPY;  Surgeon: Jose Guadalupe Velázquez M.D.;  Location: SURGERY SAME DAY [facility];  Service: Gastroenterology    KNEE AMPUTATION ABOVE Left 1/16/2024    Procedure: LEFT ABOVE KNEE AMPUTATION;  Surgeon: Obdulio Gray M.D.;  Location: SURGERY [facility];  Service: Orthopedics    PB TOTAL KNEE ARTHROPLASTY Left 10/30/2023    Procedure: LEFT TOTAL KNEE ARTHROPLASTY EXPLANTATION, INSERTION OF ANTIBIOTIC SPACER, AND APPLICATION OF INCISIONAL WOUND VACUUM;  Surgeon: Wild Luz M.D.;  Location: SURGERY [facility];  Service: Orthopedics    PB REVISE KNEE JOINT REPLACE,ALL PARTS Left 12/28/2022    Procedure: REVISION, TOTAL ARTHROPLASTY, KNEE, ALL COMPONENTS-LEFT;  Surgeon: Wild Luz M.D.;  Location: SURGERY [facility];  Service: Orthopedics    WOUND IRRIGATION & DEBRIDEMENT Left 12/28/2022    Procedure: IRRIGATION AND DEBRIDEMENT, WOUND;  Surgeon: Wild VALERIO  DENNIS Luz;  Location: SURGERY [facility];  Service: Orthopedics    PB REVISE KNEE JOINT REPLACE,ALL PARTS Left 7/19/2022    Procedure: REVISION, TOTAL ARTHROPLASTY, KNEE, ALL COMPONENTS - ANTIBIOTIC SPACER;  Surgeon: Wild Luz M.D.;  Location: SURGERY [facility];  Service: Orthopedics    WOUND IRRIGATION & DEBRIDEMENT Left 7/17/2022    Procedure: IRRIGATION AND DEBRIDEMENT, SHOULDER;  Surgeon: Obdulio Gray M.D.;  Location: SURGERY [facility];  Service: Orthopedics    PB TOTAL KNEE ARTHROPLASTY Left 8/24/2020    Procedure: ARTHROPLASTY, KNEE, TOTAL;  Surgeon: Víctor Faustin M.D.;  Location: SURGERY [facility];  Service: Orthopedics    KNEE ARTHROPLASTY TOTAL Right 2018    IRRIGATION & DEBRIDEMENT ORTHO Right 9/3/2017    Procedure: IRRIGATION & DEBRIDEMENT ORTHO, POLY EXCHANGE;  Surgeon: Jerome Russell M.D.;  Location: SURGERY [facility];  Service: Orthopedics    COLONOSCOPY WITH CLIPPING  10/28/2015    Procedure: COLONOSCOPY WITH CLIPPING;  Surgeon: Ruben Colon M.D.;  Location: ENDOSCOPY [facility];  Service:     COLONOSCOPY WITH SCLEROTHERAPY  10/28/2015    Procedure: COLONOSCOPY WITH SCLEROTHERAPY;  Surgeon: Ruben Colon M.D.;  Location: [facility];  Service:     COLONOSCOPY WITH TATTOOING  10/28/2015    Procedure: COLONOSCOPY WITH TATTOOING;  Surgeon: Ruben Colon M.D.;  Location: [facility];  Service:     GYN SURGERY  2003    hysteroscoopy    GYN SURGERY  1982    tubal ligation        CURRENT MEDS:  Current Facility-Administered Medications   Medication Dose Route Frequency Provider Last Rate Last Admin    midodrine (Proamatine) tablet 5 mg  5 mg Oral TID WITH MEALS KATE CabreraN.P.   5 mg at 05/10/25 0759    pantoprazole (Protonix) injection 40 mg  40 mg Intravenous BID Shashank Gray M.D.   40 mg at 05/10/25 0626    octreotide (SandoSTATIN) 1,250 mcg in  mL Infusion  50 mcg/hr Intravenous Continuous Shashank Gray M.D. 10  mL/hr at 05/09/25 1721 50 mcg/hr at 05/09/25 1721    NS infusion   Intravenous Continuous Shashank Gray M.D. 30 mL/hr at 05/08/25 1145 Restarted at 05/08/25 1145    acetaminophen (Tylenol) tablet 650 mg  650 mg Oral Q6HRS PRN Edmund Malone M.D.   650 mg at 05/08/25 2206    ondansetron (Zofran) syringe/vial injection 4 mg  4 mg Intravenous Q4HRS PRN Edmund Malone M.D.        Or    ondansetron (Zofran ODT) dispertab 4 mg  4 mg Oral Q4HRS PRN Edmund Malone M.D.        albuterol inhaler 2 Puff  2 Puff Inhalation Q4HRS PRN Edmund Malone M.D.        atorvastatin (Lipitor) tablet 20 mg  20 mg Oral Nightly Edmund Malone M.D.   20 mg at 05/09/25 2127    furosemide (Lasix) tablet 40 mg  40 mg Oral DAILY Shashank Gray M.D.   40 mg at 05/10/25 0625    gabapentin (Neurontin) capsule 200 mg  200 mg Oral BID Edmund Malone M.D.   200 mg at 05/10/25 0625    lactulose 20 GM/30ML solution 30 mL  30 mL Oral 4X/DAY Shashank Gray M.D.   30 mL at 05/10/25 0805    oxyCODONE immediate-release (Roxicodone) tablet 5 mg  5 mg Oral Q6HRS PRN Edmund Malone M.D.   5 mg at 05/10/25 0408    riFAXIMin (Xifaxan) tablet 550 mg  550 mg Oral BID Shashank Gray M.D.   550 mg at 05/10/25 0625    insulin lispro (HumaLOG,AdmeLOG) subcutaneous injection  1-6 Units Subcutaneous 4X/DAY ACHS Shashank Gray M.D.   1 Units at 05/08/25 1143    And    dextrose 50 % (D50W) injection 25 g  25 g Intravenous Q15 MIN PRN Shashank Gray M.D.        Metoprolol Tartrate (Lopressor) injection 5 mg  5 mg Intravenous Q5 MIN PRN Shashank Gray M.D.   5 mg at 05/07/25 1818    piperacillin-tazobactam (Zosyn) 4.5 g in  mL IVPB  4.5 g Intravenous Q8HRS Shashank Gray M.D. 25 mL/hr at 05/10/25 0625 4.5 g at 05/10/25 0625        ALLERGIES:  Allergies   Allergen Reactions    Meropenem Swelling     Pt had rxt to meropenem June 2024 (marked tongue swelling and right facial/periorbital edema)    Tuberculin Tests Unspecified     Per MAR from  [facility]       SOCIAL HISTORY:  Social History     Socioeconomic History    Marital status: Single     Spouse name: Not on file    Number of children: 2    Years of education: Not on file    Highest education level: 11th grade   Occupational History    Occupation: CNA   Tobacco Use    Smoking status: Former     Current packs/day: 0.00     Types: Cigarettes     Quit date: 2016     Years since quittin.3    Smokeless tobacco: Former     Quit date: 2021    Tobacco comments:     a few a day when I can   Vaping Use    Vaping status: Never Used   Substance and Sexual Activity    Alcohol use: Not Currently     Comment: hx of AUD    Drug use: Not Currently    Sexual activity: Not Currently     Partners: Male     Birth control/protection: Post-Menopausal     Comment: Has boyfriend, not currently sexually active   Other Topics Concern    Not on file   Social History Narrative    Not on file     Social Drivers of Health     Financial Resource Strain: Low Risk  (2020)    Overall Financial Resource Strain (CARDIA)     Difficulty of Paying Living Expenses: Not hard at all   Food Insecurity: No Food Insecurity (2025)    Hunger Vital Sign     Worried About Running Out of Food in the Last Year: Never true     Ran Out of Food in the Last Year: Never true   Transportation Needs: No Transportation Needs (2025)    PRAPARE - Transportation     Lack of Transportation (Medical): No     Lack of Transportation (Non-Medical): No   Physical Activity: Not on file   Stress: Not on file   Social Connections: Not on file   Intimate Partner Violence: Not At Risk (2025)    Humiliation, Afraid, Rape, and Kick questionnaire     Fear of Current or Ex-Partner: No     Emotionally Abused: No     Physically Abused: No     Sexually Abused: No   Housing Stability: Low Risk  (2025)    Housing Stability Vital Sign     Unable to Pay for Housing in the Last Year: No     Number of Times Moved in the Last Year: 0     Homeless in  "the Last Year: No       FAMILY HISTORY:  Family History   Problem Relation Age of Onset    Diabetes Mother     Seizures Father     Heart Attack Father 45    Diabetes Brother     Alcohol abuse Brother     Dementia Brother     Diabetes Brother         REVIEW OF SYSTEMS:  General ROS: Negative for - chills, fever, night sweats or weight loss.  HEENT ROS: Hearing loss  Respiratory ROS: Negative for - cough or shortness of breath.  Cardiovascular ROS:  Negative for - chest pain or palpitations.  Gastrointestinal ROS: As per the history of present illness.  Genito-Urinary ROS: Negative  Musculoskeletal ROS: Negative.  Neurological ROS: Negative  Skin ROS: negative  Hematology ROS: negative  Endocrinology ROS: Negative    PHYSICAL EXAM:  VITALS: /71   Pulse 61   Temp 37.1 °C (98.8 °F) (Temporal)   Resp 17   Ht 1.702 m (5' 7\")   Wt 101 kg (222 lb 10.6 oz)   LMP 06/02/2008   SpO2 97%   BMI 34.87 kg/m²   GEN:  April Alicia is an obese 65 y.o. female in no acute distress.  HEENT: Hard of hearing. Mucous membranes pink and moist.  Sclera anicteric.  Edentulous  NECK:    Neck supple without lymphadenopathy or thyromegaly.  LUNGS: Diminished throughout  HEART: Irregular rate and rhythm. S1 and S2 normal. No murmurs, gallops  ABD:  Distended, diffusely tender to palpation. +bowel sounds   RECTAL: Not done at this time.  EXT:  Left AKA, 1+ RLE edema skin with graft   SKIN:  Pink, warm, dry.  NEURO: Grossly intact, A/OR.    LABS:  Recent Labs     05/07/25  1441 05/08/25  0239 05/09/25  0112   WBC 8.7 11.5* 10.2   MCV 90.7 91.0 93.5     Recent Labs     05/07/25  1441 05/08/25  0239 05/09/25  0112   GLUCOSE 86 148* 140*   BUN 42* 44* 49*   CO2 16* 15* 17*     Lab Results   Component Value Date    INR 1.43 (H) 05/06/2025    INR 1.39 (H) 01/15/2025    INR 1.47 (H) 12/26/2024     No components found for: \"ALT\", \"AST\", \"GGT\", \"ALKPHOS\"  No results found for: \"BILINEO\"      @[email or web address]([id number])@ "     @[facility]([id number])@       IMPRESSION/PLAN:  Hematochezia  Acute blood loss anemia  Hepatitis C (treated) and fatty liver induced cirrhosis with portal hypertension  Abdominal ascites - not enough fluid for paracentesis  Hepatic encephalopathy - controlled  Splenomegaly and thrombocytopenia  Elevated INR   Hypoalbuminemia  Iron deficiency  Cardiomegaly with pulmonary hypertension, RVSP 25 mmHg on echo Jan 2024    Recommendations:  Trend H/H and transfuse for hgb <7  Continue lactulose and rifaxamin   Continue lasix 40 mg, recommend adding spironolactone 100 mg for ratio of 40/100  Check INR and await labs to calculate MELD  Clear liquid diet today  GoLytely at 1600  NPO at midnight  EGD and colonoscopy tomorrow     Discussed with patient, RN, Dr. Gray, Dr. Melania Aquino, DNP,  APRN  Gastroenterology

## 2025-05-10 NOTE — PROGRESS NOTES
Brief ID note:    Chart reviewed.  Patient afebrile overnight, WBC 10.2.  Pseudomonas is pan susceptible.  C. difficile PCR negative    -Continue IV Zosyn  -At discharge, okay to transition to ciprofloxacin to complete a 7-day course total with stop date of 5/14/2025    Discussed with hospitalist Dr. Gray.  ID signing off

## 2025-05-11 ENCOUNTER — ANESTHESIA EVENT (OUTPATIENT)
Dept: SURGERY | Facility: MEDICAL CENTER | Age: 65
DRG: 871 | End: 2025-05-11
Payer: MEDICARE

## 2025-05-11 ENCOUNTER — ANESTHESIA (OUTPATIENT)
Dept: SURGERY | Facility: MEDICAL CENTER | Age: 65
DRG: 871 | End: 2025-05-11
Payer: MEDICARE

## 2025-05-11 LAB
ALBUMIN SERPL BCP-MCNC: 2 G/DL (ref 3.2–4.9)
ALBUMIN/GLOB SERPL: 0.5 G/DL
ALP SERPL-CCNC: 106 U/L (ref 30–99)
ALT SERPL-CCNC: 6 U/L (ref 2–50)
ANION GAP SERPL CALC-SCNC: 9 MMOL/L (ref 7–16)
AST SERPL-CCNC: 15 U/L (ref 12–45)
BACTERIA BLD CULT: NORMAL
BILIRUB SERPL-MCNC: 0.9 MG/DL (ref 0.1–1.5)
BUN SERPL-MCNC: 39 MG/DL (ref 8–22)
CALCIUM ALBUM COR SERPL-MCNC: 9 MG/DL (ref 8.5–10.5)
CALCIUM SERPL-MCNC: 7.4 MG/DL (ref 8.5–10.5)
CHLORIDE SERPL-SCNC: 112 MMOL/L (ref 96–112)
CO2 SERPL-SCNC: 15 MMOL/L (ref 20–33)
CREAT SERPL-MCNC: 1.46 MG/DL (ref 0.5–1.4)
ERYTHROCYTE [DISTWIDTH] IN BLOOD BY AUTOMATED COUNT: 55 FL (ref 35.9–50)
GFR SERPLBLD CREATININE-BSD FMLA CKD-EPI: 40 ML/MIN/1.73 M 2
GLOBULIN SER CALC-MCNC: 3.7 G/DL (ref 1.9–3.5)
GLUCOSE BLD STRIP.AUTO-MCNC: 83 MG/DL (ref 65–99)
GLUCOSE SERPL-MCNC: 94 MG/DL (ref 65–99)
HCT VFR BLD AUTO: 29.6 % (ref 37–47)
HCT VFR BLD AUTO: 30.8 % (ref 37–47)
HGB BLD-MCNC: 8.8 G/DL (ref 12–16)
HGB BLD-MCNC: 9.6 G/DL (ref 12–16)
MCH RBC QN AUTO: 27.8 PG (ref 27–33)
MCHC RBC AUTO-ENTMCNC: 29.7 G/DL (ref 32.2–35.5)
MCV RBC AUTO: 93.4 FL (ref 81.4–97.8)
PLATELET # BLD AUTO: 85 K/UL (ref 164–446)
PLATELETS.RETICULATED NFR BLD AUTO: 1.1 % (ref 0.6–13.1)
PMV BLD AUTO: 11.3 FL (ref 9–12.9)
POTASSIUM SERPL-SCNC: 4.4 MMOL/L (ref 3.6–5.5)
PROT SERPL-MCNC: 5.7 G/DL (ref 6–8.2)
RBC # BLD AUTO: 3.17 M/UL (ref 4.2–5.4)
SIGNIFICANT IND 70042: NORMAL
SITE SITE: NORMAL
SODIUM SERPL-SCNC: 136 MMOL/L (ref 135–145)
SOURCE SOURCE: NORMAL
WBC # BLD AUTO: 6.9 K/UL (ref 4.8–10.8)

## 2025-05-11 PROCEDURE — 0DJ68ZZ INSPECTION OF STOMACH, VIA NATURAL OR ARTIFICIAL OPENING ENDOSCOPIC: ICD-10-PCS | Performed by: INTERNAL MEDICINE

## 2025-05-11 PROCEDURE — 700105 HCHG RX REV CODE 258: Performed by: INTERNAL MEDICINE

## 2025-05-11 PROCEDURE — 160035 HCHG PACU - 1ST 60 MINS PHASE I: Performed by: INTERNAL MEDICINE

## 2025-05-11 PROCEDURE — 770020 HCHG ROOM/CARE - TELE (206)

## 2025-05-11 PROCEDURE — A9270 NON-COVERED ITEM OR SERVICE: HCPCS | Performed by: STUDENT IN AN ORGANIZED HEALTH CARE EDUCATION/TRAINING PROGRAM

## 2025-05-11 PROCEDURE — 160002 HCHG RECOVERY MINUTES (STAT): Performed by: INTERNAL MEDICINE

## 2025-05-11 PROCEDURE — 700111 HCHG RX REV CODE 636 W/ 250 OVERRIDE (IP): Performed by: ANESTHESIOLOGY

## 2025-05-11 PROCEDURE — 80053 COMPREHEN METABOLIC PANEL: CPT

## 2025-05-11 PROCEDURE — 160202 HCHG ENDO MINUTES - 1ST 30 MINS LEVEL 3: Performed by: INTERNAL MEDICINE

## 2025-05-11 PROCEDURE — 160048 HCHG OR STATISTICAL LEVEL 1-5: Performed by: INTERNAL MEDICINE

## 2025-05-11 PROCEDURE — 700105 HCHG RX REV CODE 258: Performed by: ANESTHESIOLOGY

## 2025-05-11 PROCEDURE — 700101 HCHG RX REV CODE 250: Performed by: ANESTHESIOLOGY

## 2025-05-11 PROCEDURE — 700102 HCHG RX REV CODE 250 W/ 637 OVERRIDE(OP): Performed by: STUDENT IN AN ORGANIZED HEALTH CARE EDUCATION/TRAINING PROGRAM

## 2025-05-11 PROCEDURE — 160009 HCHG ANES TIME/MIN: Performed by: INTERNAL MEDICINE

## 2025-05-11 PROCEDURE — 0DJD8ZZ INSPECTION OF LOWER INTESTINAL TRACT, VIA NATURAL OR ARTIFICIAL OPENING ENDOSCOPIC: ICD-10-PCS | Performed by: INTERNAL MEDICINE

## 2025-05-11 PROCEDURE — 85027 COMPLETE CBC AUTOMATED: CPT

## 2025-05-11 PROCEDURE — 45378 DIAGNOSTIC COLONOSCOPY: CPT | Performed by: INTERNAL MEDICINE

## 2025-05-11 PROCEDURE — 36415 COLL VENOUS BLD VENIPUNCTURE: CPT

## 2025-05-11 PROCEDURE — A9270 NON-COVERED ITEM OR SERVICE: HCPCS

## 2025-05-11 PROCEDURE — 82962 GLUCOSE BLOOD TEST: CPT

## 2025-05-11 PROCEDURE — 43235 EGD DIAGNOSTIC BRUSH WASH: CPT | Performed by: INTERNAL MEDICINE

## 2025-05-11 PROCEDURE — A9270 NON-COVERED ITEM OR SERVICE: HCPCS | Performed by: INTERNAL MEDICINE

## 2025-05-11 PROCEDURE — 160015 HCHG STAT PREOP MINUTES: Performed by: INTERNAL MEDICINE

## 2025-05-11 PROCEDURE — 700102 HCHG RX REV CODE 250 W/ 637 OVERRIDE(OP)

## 2025-05-11 PROCEDURE — 160207 HCHG ENDO MINUTES - EA ADDL 1 MIN LEVEL 3: Performed by: INTERNAL MEDICINE

## 2025-05-11 PROCEDURE — 700102 HCHG RX REV CODE 250 W/ 637 OVERRIDE(OP): Performed by: INTERNAL MEDICINE

## 2025-05-11 PROCEDURE — 85055 RETICULATED PLATELET ASSAY: CPT

## 2025-05-11 PROCEDURE — 700111 HCHG RX REV CODE 636 W/ 250 OVERRIDE (IP): Mod: JZ | Performed by: INTERNAL MEDICINE

## 2025-05-11 PROCEDURE — 99233 SBSQ HOSP IP/OBS HIGH 50: CPT | Performed by: INTERNAL MEDICINE

## 2025-05-11 RX ORDER — HALOPERIDOL 5 MG/ML
1 INJECTION INTRAMUSCULAR
Status: DISCONTINUED | OUTPATIENT
Start: 2025-05-11 | End: 2025-05-11

## 2025-05-11 RX ORDER — LIDOCAINE HYDROCHLORIDE 20 MG/ML
INJECTION, SOLUTION EPIDURAL; INFILTRATION; INTRACAUDAL; PERINEURAL PRN
Status: DISCONTINUED | OUTPATIENT
Start: 2025-05-11 | End: 2025-05-11 | Stop reason: SURG

## 2025-05-11 RX ORDER — SODIUM CHLORIDE, SODIUM LACTATE, POTASSIUM CHLORIDE, CALCIUM CHLORIDE 600; 310; 30; 20 MG/100ML; MG/100ML; MG/100ML; MG/100ML
INJECTION, SOLUTION INTRAVENOUS CONTINUOUS
Status: DISCONTINUED | OUTPATIENT
Start: 2025-05-11 | End: 2025-05-11

## 2025-05-11 RX ORDER — HYDROMORPHONE HYDROCHLORIDE 1 MG/ML
0.4 INJECTION, SOLUTION INTRAMUSCULAR; INTRAVENOUS; SUBCUTANEOUS
Status: DISCONTINUED | OUTPATIENT
Start: 2025-05-11 | End: 2025-05-11

## 2025-05-11 RX ORDER — METOPROLOL TARTRATE 1 MG/ML
INJECTION, SOLUTION INTRAVENOUS PRN
Status: DISCONTINUED | OUTPATIENT
Start: 2025-05-11 | End: 2025-05-11 | Stop reason: SURG

## 2025-05-11 RX ORDER — ONDANSETRON 2 MG/ML
4 INJECTION INTRAMUSCULAR; INTRAVENOUS
Status: DISCONTINUED | OUTPATIENT
Start: 2025-05-11 | End: 2025-05-11

## 2025-05-11 RX ORDER — DIPHENHYDRAMINE HYDROCHLORIDE 50 MG/ML
12.5 INJECTION, SOLUTION INTRAMUSCULAR; INTRAVENOUS
Status: DISCONTINUED | OUTPATIENT
Start: 2025-05-11 | End: 2025-05-11

## 2025-05-11 RX ORDER — HYDRALAZINE HYDROCHLORIDE 20 MG/ML
5 INJECTION INTRAMUSCULAR; INTRAVENOUS
Status: DISCONTINUED | OUTPATIENT
Start: 2025-05-11 | End: 2025-05-11

## 2025-05-11 RX ORDER — HYDROMORPHONE HYDROCHLORIDE 1 MG/ML
0.5 INJECTION, SOLUTION INTRAMUSCULAR; INTRAVENOUS; SUBCUTANEOUS
Status: DISCONTINUED | OUTPATIENT
Start: 2025-05-11 | End: 2025-05-11

## 2025-05-11 RX ORDER — SODIUM CHLORIDE, SODIUM LACTATE, POTASSIUM CHLORIDE, CALCIUM CHLORIDE 600; 310; 30; 20 MG/100ML; MG/100ML; MG/100ML; MG/100ML
INJECTION, SOLUTION INTRAVENOUS
Status: DISCONTINUED | OUTPATIENT
Start: 2025-05-11 | End: 2025-05-11 | Stop reason: SURG

## 2025-05-11 RX ORDER — ALBUTEROL SULFATE 5 MG/ML
2.5 SOLUTION RESPIRATORY (INHALATION)
Status: DISCONTINUED | OUTPATIENT
Start: 2025-05-11 | End: 2025-05-11

## 2025-05-11 RX ORDER — HYDROMORPHONE HYDROCHLORIDE 1 MG/ML
0.2 INJECTION, SOLUTION INTRAMUSCULAR; INTRAVENOUS; SUBCUTANEOUS
Status: DISCONTINUED | OUTPATIENT
Start: 2025-05-11 | End: 2025-05-11

## 2025-05-11 RX ORDER — PROPRANOLOL HYDROCHLORIDE 10 MG/1
10 TABLET ORAL TWICE DAILY
Status: DISCONTINUED | OUTPATIENT
Start: 2025-05-11 | End: 2025-05-12

## 2025-05-11 RX ADMIN — ATORVASTATIN CALCIUM 20 MG: 20 TABLET, FILM COATED ORAL at 20:55

## 2025-05-11 RX ADMIN — OXYCODONE 5 MG: 5 TABLET ORAL at 23:09

## 2025-05-11 RX ADMIN — LIDOCAINE HYDROCHLORIDE 60 MG: 20 INJECTION, SOLUTION EPIDURAL; INFILTRATION; INTRACAUDAL; PERINEURAL at 11:13

## 2025-05-11 RX ADMIN — NYSTATIN: 100000 POWDER TOPICAL at 17:06

## 2025-05-11 RX ADMIN — RIFAXIMIN 550 MG: 550 TABLET ORAL at 17:04

## 2025-05-11 RX ADMIN — PIPERACILLIN AND TAZOBACTAM 4.5 G: 4; .5 INJECTION, POWDER, FOR SOLUTION INTRAVENOUS at 05:10

## 2025-05-11 RX ADMIN — OXYCODONE 5 MG: 5 TABLET ORAL at 03:23

## 2025-05-11 RX ADMIN — PROPRANOLOL HYDROCHLORIDE 10 MG: 10 TABLET ORAL at 17:04

## 2025-05-11 RX ADMIN — LACTULOSE 30 ML: 10 SOLUTION ORAL at 13:48

## 2025-05-11 RX ADMIN — PIPERACILLIN AND TAZOBACTAM 4.5 G: 4; .5 INJECTION, POWDER, FOR SOLUTION INTRAVENOUS at 13:58

## 2025-05-11 RX ADMIN — RIFAXIMIN 550 MG: 550 TABLET ORAL at 05:12

## 2025-05-11 RX ADMIN — GABAPENTIN 200 MG: 100 CAPSULE ORAL at 05:11

## 2025-05-11 RX ADMIN — METOPROLOL TARTRATE 5 MG: 5 INJECTION INTRAVENOUS at 11:28

## 2025-05-11 RX ADMIN — SODIUM CHLORIDE, POTASSIUM CHLORIDE, SODIUM LACTATE AND CALCIUM CHLORIDE: 600; 310; 30; 20 INJECTION, SOLUTION INTRAVENOUS at 11:10

## 2025-05-11 RX ADMIN — NYSTATIN: 100000 POWDER TOPICAL at 05:12

## 2025-05-11 RX ADMIN — FUROSEMIDE 40 MG: 40 TABLET ORAL at 05:11

## 2025-05-11 RX ADMIN — SODIUM CHLORIDE: 9 INJECTION, SOLUTION INTRAVENOUS at 13:57

## 2025-05-11 RX ADMIN — OXYCODONE 5 MG: 5 TABLET ORAL at 17:03

## 2025-05-11 RX ADMIN — LACTULOSE 30 ML: 10 SOLUTION ORAL at 17:05

## 2025-05-11 RX ADMIN — LACTULOSE 30 ML: 10 SOLUTION ORAL at 20:54

## 2025-05-11 RX ADMIN — PANTOPRAZOLE SODIUM 40 MG: 40 INJECTION, POWDER, FOR SOLUTION INTRAVENOUS at 05:11

## 2025-05-11 RX ADMIN — PIPERACILLIN AND TAZOBACTAM 4.5 G: 4; .5 INJECTION, POWDER, FOR SOLUTION INTRAVENOUS at 20:54

## 2025-05-11 RX ADMIN — PROPOFOL 50 MG: 10 INJECTION, EMULSION INTRAVENOUS at 11:13

## 2025-05-11 RX ADMIN — OXYCODONE 5 MG: 5 TABLET ORAL at 13:41

## 2025-05-11 RX ADMIN — PANTOPRAZOLE SODIUM 40 MG: 40 INJECTION, POWDER, FOR SOLUTION INTRAVENOUS at 17:05

## 2025-05-11 RX ADMIN — PROPOFOL 160 MCG/KG/MIN: 10 INJECTION, EMULSION INTRAVENOUS at 11:14

## 2025-05-11 RX ADMIN — GABAPENTIN 200 MG: 100 CAPSULE ORAL at 17:04

## 2025-05-11 ASSESSMENT — PAIN DESCRIPTION - PAIN TYPE
TYPE: CHRONIC PAIN
TYPE: ACUTE PAIN
TYPE: CHRONIC PAIN
TYPE: ACUTE PAIN
TYPE: CHRONIC PAIN
TYPE: ACUTE PAIN
TYPE: ACUTE PAIN

## 2025-05-11 ASSESSMENT — ENCOUNTER SYMPTOMS
PSYCHIATRIC NEGATIVE: 1
BLOOD IN STOOL: 1
MYALGIAS: 1
EYES NEGATIVE: 1
FEVER: 0
DIZZINESS: 1
COUGH: 1
ABDOMINAL PAIN: 1
SHORTNESS OF BREATH: 1
WEAKNESS: 1
VOMITING: 1
CHILLS: 1
NAUSEA: 1

## 2025-05-11 ASSESSMENT — PAIN SCALES - GENERAL: PAIN_LEVEL: 0

## 2025-05-11 ASSESSMENT — FIBROSIS 4 INDEX: FIB4 SCORE: 4.68

## 2025-05-11 NOTE — CARE PLAN
The patient is Stable - Low risk of patient condition declining or worsening    Shift Goals  Clinical Goals: bowel prep, BMS, skin intergity, IV abx, NPO  Patient Goals: finish cockatail,  Family Goals: gisele    Progress made toward(s) clinical / shift goals:    Problem: Knowledge Deficit - Standard  Goal: Patient and family/care givers will demonstrate understanding of plan of care, disease process/condition, diagnostic tests and medications  Description: Target End Date:  1-3 days or as soon as patient condition allowsDocument in Patient Education1.  Patient and family/caregiver oriented to unit, equipment, visitation policy and means for communicating concern2.  Complete/review Learning Assessment3.  Assess knowledge level of disease process/condition, treatment plan, diagnostic tests and medications4.  Explain disease process/condition, treatment plan, diagnostic tests and medications  Outcome: Progressing     Problem: Hemodynamics  Goal: Patient's hemodynamics, fluid balance and neurologic status will be stable or improve  Description: Target End Date:  Prior to discharge or change in level of careDocument on Assessment and I/O flowsheet templates1.  Monitor vital signs, pulse oximetry and cardiac monitor per provider order and/or policy2.  Maintain blood pressure per provider order3.  Hemodynamic monitoring per provider order4.  Manage IV fluids and IV infusions5.  Monitor intake and output6.  Daily weights per unit policy or provider order7.  Assess peripheral pulses and capillary refill8.  Assess color and body temperature9.  Position patient for maximum circulation/cardiac [id number]. Monitor for signs/symptoms of excessive [id number]. Assess mental status, restlessness and changes in level of [id number]. Monitor temperature and report fever or hypothermia to provider immediately. Consideration of targeted temperature management.  Outcome: Progressing     Problem: Fluid Volume  Goal: Fluid volume balance  will be maintained  Description: Target End Date:  Prior to discharge or change in level of careDocument on I/O flowsheet1.  Monitor intake and output as ordered2.  Promote oral intake as appropriate3.  Report inadequate intake or output to physician4.  Administer IV therapy as ordered5.  Weights per provider order6.  Assess for signs and symptoms of bleeding7.  Monitor for signs of fluid overload (respiratory changes, edema, weight gain, increased abdominal girth)8.  Monitor of signs for inadequate fluid volume (poor skin turgor, dry mucous membranes)9.  Instruct patient on adherence to fluid restrictions  Outcome: Progressing     Problem: Skin Integrity  Goal: Skin integrity is maintained or improved  Description: Target End Date:  Prior to discharge or change in level of careDocument interventions on Skin Risk/Sincere flowsheet groups and corresponding LDA1.  Assess and monitor skin integrity, appearance and/or temperature2.  Assess risk factors for impaired skin integrity and/or pressures ulcers3.  Implement precautions to protect skin integrity in collaboration with interdisciplinary team4.  Implement pressure ulcer prevention protocol if at risk for skin breakdown5.  Confirm wound care consult if at risk for skin breakdown6.  Ensure patient use of pressure relieving devices  (Low air loss bed, waffle overlay, heel protectors, ROHO cushion, etc)  Outcome: Progressing     Problem: Fall Risk  Goal: Patient will remain free from falls  Description: Target End Date:  Prior to discharge or change in level of careDocument interventions on the [facility] Fall Risk Assessment1.  Assess for fall risk factors2.  Implement fall precautions  Outcome: Progressing     Problem: Pain - Standard  Goal: Alleviation of pain or a reduction in pain to the patient’s comfort goal  Description: Target End Date:  Prior to discharge or change in level of careDocument on Vitals flowsheet1.  Document pain using the appropriate pain scale  per order or unit policy2.  Educate and implement non-pharmacologic comfort measures (i.e. relaxation, distraction, massage, cold/heat therapy, etc.)3.  Pain management medications as ordered4.  Reassess pain after pain med administration per policy5.  If opiods administered assess patient's response to pain medication is appropriate per POSS sedation scale6.  Follow pain management plan developed in collaboration with patient and interdisciplinary team (including palliative care or pain specialists if applicable)  Outcome: Progressing

## 2025-05-11 NOTE — PROGRESS NOTES
MS:    Atrial Fibrillation (58 - 79) with rare PVCs, triplets, couplets with a 6 and 8 beat run of VT  - - / 0.07 / - -

## 2025-05-11 NOTE — ANESTHESIA TIME REPORT
Anesthesia Start and Stop Event Times       Date Time Event    5/11/2025 1025 Ready for Procedure     1110 Anesthesia Start     1149 Anesthesia Stop          Responsible Staff  05/11/25      Name Role Begin End    Ari Bee M.D. Anesth 1110 1149          Overtime Reason:  no overtime (within assigned shift)    Comments:

## 2025-05-11 NOTE — WOUND TEAM
Pt seen for placement of BMS. MD order verified. Pt assessed, BMS placement to assist with prep for upcoming procedure. Sacrum/buttocks intact. Sphincter tone determined to be adequate. BMS placed without difficulty, 45 mL of tap water placed in retention balloon, irrigated with 120 mL warm tap water. Confirmed irrigant/stool output in tube. Nursing to irrigate q shift with 60 mL-120 mL warm tap water or until patent.

## 2025-05-11 NOTE — ANESTHESIA PREPROCEDURE EVALUATION
Case: [id number] Date/Time: 05/11/25 1022    Procedures:       GASTROSCOPY      COLONOSCOPY    Location: [facility] OR 06 / SURGERY [facility]    Surgeons: Jose Guadalupe Velázquez M.D.            Relevant Problems   ANESTHESIA   (positive) Sleep apnea      PULMONARY   (positive) Asthma   (positive) Mild intermittent asthma without complication      NEURO   (positive) Migraine with aura and without status migrainosus, not intractable   (positive) Seizures (HCC)      CARDIAC   (positive) Atrial fibrillation with RVR (HCC)   (positive) Hypertension   (positive) Migraine with aura and without status migrainosus, not intractable   (positive) Mitral valve regurgitation   (positive) Murmur   (positive) Paroxysmal atrial fibrillation   (positive) Portal hypertension (HCC)      GI   (positive) GERD (gastroesophageal reflux disease)         (positive) ELIZABETH (acute kidney injury) (HCC)   (positive) Alcoholic cirrhosis (HCC)      ENDO   (positive) DM2 (diabetes mellitus, type 2) ([facility])   (positive) Type 2 diabetes mellitus with diabetic neuropathy, with long-term current use of insulin (HCC)      Other   (positive) Arthritis   (positive) Erosive osteoarthritis of right hand       Physical Exam    Airway   Mallampati: II  TM distance: >3 FB  Neck ROM: full       Cardiovascular - normal exam  Rhythm: regular  Rate: normal  (-) murmur     Dental       Very poor dentition     Pulmonary - normal exam  Breath sounds clear to auscultation     Abdominal    Neurological - normal exam                   Anesthesia Plan    ASA 3 (EtOH cirrhosis)   ASA physical status 3 criteria: other (comment)    Plan - MAC               Induction: intravenous          Informed Consent:    Anesthetic plan and risks discussed with patient.    Use of blood products discussed with: patient whom consented to blood products.

## 2025-05-11 NOTE — OP REPORT
PreOp Diagnosis: Anemia, hematochezia, cirrhosis      PostOp Diagnosis:   - Grade 1 esophageal varices  - Portal hypertensive gastropathy  - Mild rectal varix x 1        Procedure(s):  GASTROSCOPY - Wound Class: Clean Contaminated  COLONOSCOPY - Wound Class: Clean Contaminated    Surgeon(s):  Jose Guadalupe Velázquez M.D.    Anesthesiologist/Type of Anesthesia:  Anesthesiologist: Ari Bee M.D./MAC    Surgical Staff:  Endoscopy Technician: Laura Cardoza  Relief Circulator: Rosetta Crespo R.N.  Endoscopy Nurse: Connie Woodson R.N.    Specimens removed if any:  * No specimens in log *      CONSENT: The risks, benefits and alternatives of the procedure were discussed in detail. The risks include and are not limited to bleeding, infection, perforation, missed lesions, and sedations risks (cardiopulmonary compromise and allergic reaction to medications).    DESCRIPTION:   The patient presented to the operating room.  A time out was performed prior to beginning the procedure.   The patient was placed in the left lateral position.   Patient was sedated by anesthesia: Propofol.    OPERATIVE FINDINGS:    Esophagus: Grade 1 esophageal varices.  Stomach: Mild portal hypertensive gastropathy.  No gastric varices seen on retroflexion.  Duodenum: Normal to second portion.  Patient repositioned.  Digital rectal examination normal.  Endoscope advanced to the terminal ileum.  Withdrawal time 6 minutes.  Gordon prep score: 9.  Terminal ileum was normal.  Evidence of prior tattooing in the descending colon/splenic flexure region.  Scattered mild diverticular changes in the sigmoid colon.  1 rectal varix which was mild seen on retroflexion in the rectum.  No evidence of continued bleeding.  Colonoscopy otherwise normal.    Blood loss: None    The patient tolerated the procedure well.      There were no immediate complications.    IMPRESSION:  - Grade 1 esophageal varices  - Portal hypertensive gastropathy  - Mild rectal  varix x 1      RECOMMENDATIONS:  Advance diet 2 g sodium restriction.  Treat conservatively for hematochezia.  Return patient to hospital beltran for continued care.  Given age and comorbidities would not recommend repeat colonoscopy for surveillance purposes.  Repeat examination as needed based on symptoms.  Consider relook EGD in 1 to 2 years based on clinical course (variceal surveillance).

## 2025-05-11 NOTE — ANESTHESIA POSTPROCEDURE EVALUATION
Patient: April Alicia    Procedure Summary       Date: 05/11/25 Room / Location: [facility] OR 06 / SURGERY [facility]    Anesthesia Start: 1110 Anesthesia Stop: 1149    Procedures:       GASTROSCOPY (Esophagus)      COLONOSCOPY (Anus) Diagnosis: (Portal Hypertensive Gastropathy, normal colon test)    Surgeons: Jose Guadalupe Velázquez M.D. Responsible Provider: Ari Bee M.D.    Anesthesia Type: MAC ASA Status: 3            Final Anesthesia Type: MAC  Last vitals  BP   Blood Pressure : (!) 81/51    Temp   36.7 °C (98 °F)    Pulse   98   Resp   20    SpO2   94 %      Anesthesia Post Evaluation    Patient location during evaluation: PACU  Patient participation: complete - patient participated  Level of consciousness: awake and alert  Pain score: 0    Airway patency: patent  Anesthetic complications: no  Cardiovascular status: hemodynamically stable  Respiratory status: acceptable  Hydration status: euvolemic    PONV: none          No notable events documented.     Nurse Pain Score: 0 (NPRS)

## 2025-05-11 NOTE — PROGRESS NOTES
Received bedside report from RN Rica, pt care assumed. VS WDL, pt assessment complete. Pt A&Ox4, c/o 4/10 pain at this time. POC discussed with pt and verbalizes no questions. Pt denies any additional needs at this time. Bed locked and in lowest position, bed alarm on. Pt educated on fall risk and verbalized understanding, call light within reach, hourly rounding initiated.

## 2025-05-11 NOTE — PROGRESS NOTES
Monitor Summary  Rhythm: Afib  Rate: 65-90  Ectopy: (F) pvc, (R) coup, (F) coup  Measurements: -/.08/-  ---12 hr Chart Review---

## 2025-05-11 NOTE — PROGRESS NOTES
[facility] Medicine Daily Progress Note    Date of Service  5/11/2025    Chief Complaint  April Alicia is a 65 y.o. female admitted 5/6/2025 with fevers, chills, cough.    Hospital Course  Patient is a 65-year-old female with past medical history of alcoholic cirrhosis, migraine, HTN, paroxysmal atrial fibrillation not on anticoagulation, GERD, chronic pain, NIDDM 2, seizure disorder, asthma, status post left AKA that presents with 1 day history of malaise, dry cough, fever, chills.     Patient states that they have been having a dry cough, full body soreness, fever, chills since yesterday.  Denies any lower urinary tract symptoms.  States that they would always have abdominal pain which is unchanged.     In the ED, BP 90s to 130s/50s to 90s, HR 60s to 150s, RR 11-21, Tmax 100.8 F, saturating well on room air.  Blood cultures x 2 drawn in the ED.  Received LR 1 L bolus x 1, diltiazem 18.6 mg IV x 1.  WBC 6.2, hemoglobin 7.8, PLT 86, bicarb 17, anion gap 9, BUN 45, creatinine 1.35, alk phos 211, lactic acid 1.6, troponin 25>> 19, NT proBNP 894.  COVID/flu/RSV negative.  UA with trace ketones, large occult blood, negative nitrite, moderate leukocyte esterase, 21-50 WBC, 21-50 RBC, many bacteria.  Chest x-ray with trace bilateral pleural effusions.  CT chest abdomen pelvis with contrast showing cirrhosis with recannulization of the umbilical vein and prominent anterior abdominal wall varices compatible with portal hypertension, splenomegaly, moderate scattered abdominal ascites, small bilateral pleural effusions left greater than right, prominent main pulmonary artery suggesting pulmonary arterial hypertension.  EKG atrial fibrillation , QTc 484, with diffuse T wave flattening, similar to previous.    Interval Problem Update  5/7 patient reports symptoms of subjective fevers, chills, nausea, vomiting, abdominal pain, shallow breathing. Reports feeling very tired and fatigued and unable to ambulate.  She  denies any dysuria.  She is noted to be anemic with a hemoglobin of 7.8 and thrombocytopenic with a platelet count of 86.  Monitor CBC and transfuse for hemoglobin less than 7 or platelets less than 10 or if she develops active bleeding.  Ultrasound-guided paracentesis is pending.  Continue empiric IV ceftriaxone and Flagyl    5/8 Patient as blood cultures positive for Pseudomonas.  Switch antibiotics to IV Zosyn.  Likely source is intra-abdominal.  She does report some lower abdominal pain.  CT chest, abdomen and pelvis revealed evidence of cirrhosis and portal hypertension with scattered abdominal ascites and small bilateral pleural effusions.  Will consult ID.  Ultrasound reveals small volume ascites and no safe access window for paracentesis.    5/9 Patient reported bloody bowel movements that she states have been going on for the past week.  Her hemoglobin dropped to 6.7 this morning and she received a unit of PRBC.  Platelets have been low at 60.  Avoid aspirin or anticoagulation.  I will start the patient on IV Protonix.  I have consulted and discussed the case with GI who will evaluate the patient and plan for EGD/colonoscopy tomorrow.  Continue cardiac monitoring.  I also discussed the case with ID and at this point we will continue with IV Zosyn for treatment of her Pseudomonas bacteremia.    5/10 patient continues to report some lower abdominal pain.  Her blood pressure is improved to 133/82.  She has been bradycardic on octreotide therefore will discontinue octreotide.  Continue cardiac monitoring.  Her hemoglobin returned at 8.9.  Platelets are low at 72.  I consulted and discussed the case with GI, plan for EGD and colonoscopy tomorrow.  N.p.o. at midnight.  C. difficile was negative.  Pseudomonas is pansensitive, continue IV Zosyn for now and may transition to Cipro on discharge with stop date of 5/14/2025 per ID.    5/11 patient underwent EGD and Colonoscopy that revealed grade 1 esophageal varices,  portal hypertensive gastropathy and mild rectal varices.  Will continue to monitor closely for ongoing bleeding.  Monitor CBC and transfuse for hemoglobin less than 7.  Advance diet to 2 g sodium striction.  Will add on propranolol 10 g twice daily for portal hypertension and titrate up as tolerated    I have discussed this patient's plan of care and discharge plan at IDT rounds today with Case Management, Nursing, Nursing leadership, and other members of the IDT team.    Consultants/Specialty  None    Code Status  Full Code    Disposition  Not medically cleared  I have placed the appropriate orders for post-discharge needs.    Review of Systems  Review of Systems   Constitutional:  Positive for chills and malaise/fatigue. Negative for fever.   HENT: Negative.     Eyes: Negative.    Respiratory:  Positive for cough and shortness of breath.    Cardiovascular:  Negative for chest pain.   Gastrointestinal:  Positive for abdominal pain, blood in stool, melena, nausea and vomiting.   Genitourinary:  Negative for dysuria.   Musculoskeletal:  Positive for myalgias.   Skin: Negative.    Neurological:  Positive for dizziness and weakness (Generalized).   Endo/Heme/Allergies: Negative.    Psychiatric/Behavioral: Negative.          Physical Exam  Temp:  [36.5 °C (97.7 °F)-36.9 °C (98.4 °F)] 36.8 °C (98.3 °F)  Pulse:  [55-98] 69  Resp:  [16-20] 16  BP: ()/(50-87) 122/81  SpO2:  [93 %-99 %] 96 %    Physical Exam  Vitals and nursing note reviewed.   Constitutional:       Appearance: She is obese. She is ill-appearing.   HENT:      Head: Normocephalic and atraumatic.      Nose: Nose normal.      Mouth/Throat:      Mouth: Mucous membranes are dry.   Eyes:      Pupils: Pupils are equal, round, and reactive to light.   Cardiovascular:      Rate and Rhythm: Normal rate. Rhythm irregular.      Pulses: Normal pulses.      Heart sounds: Normal heart sounds.   Pulmonary:      Effort: Pulmonary effort is normal.      Breath sounds:  No rhonchi.   Abdominal:      General: There is distension.      Tenderness: There is abdominal tenderness.   Musculoskeletal:         General: Normal range of motion.      Right lower leg: Edema present.      Left lower leg: Edema present.   Skin:     General: Skin is warm.      Capillary Refill: Capillary refill takes less than 2 seconds.   Neurological:      General: No focal deficit present.      Mental Status: She is alert.   Psychiatric:         Behavior: Behavior normal.         Fluids    Intake/Output Summary (Last 24 hours) at 5/11/2025 1440  Last data filed at 5/11/2025 1141  Gross per 24 hour   Intake 450 ml   Output 1250 ml   Net -800 ml        Laboratory  Recent Labs     05/09/25  0112 05/09/25  1534 05/10/25  1413 05/10/25  2337 05/11/25  0233   WBC 10.2  --  7.3  --  6.9   RBC 2.32*  --  3.12*  --  3.17*   HEMOGLOBIN 6.7*   < > 8.9* 9.6* 8.8*   HEMATOCRIT 21.7*   < > 29.0* 30.8* 29.6*   MCV 93.5  --  92.9  --  93.4   MCH 28.9  --  28.5  --  27.8   MCHC 30.9*  --  30.7*  --  29.7*   RDW 54.7*  --  54.6*  --  55.0*   PLATELETCT 60*  --  72*  --  85*   MPV 12.3  --  12.8  --  11.3    < > = values in this interval not displayed.     Recent Labs     05/09/25  0112 05/10/25  1413 05/11/25  0233   SODIUM 136 137 136   POTASSIUM 4.3 4.5 4.4   CHLORIDE 113* 114* 112   CO2 17* 15* 15*   GLUCOSE 140* 125* 94   BUN 49* 43* 39*   CREATININE 1.47* 1.42* 1.46*   CALCIUM 7.5* 7.6* 7.4*     Recent Labs     05/10/25  1413   INR 1.53*               Imaging  US-ABDOMEN LTD (SOFT TISSUE)   Final Result         1. Small volume ascites. No safe access window for paracentesis at this time.         CT-CHEST,ABDOMEN,PELVIS WITH   Final Result         1.  Cirrhosis with recanalization of the umbilical vein and prominent anterior abdominal wall varices, compatible with portal hypertension.   2.  Splenomegaly.   3.  Moderate scattered abdominal ascites.   4.  Small bilateral pleural effusions, left greater than right.   5.   Linear densities the bilateral lung bases favor changes of atelectasis.   6.  Cardiomegaly.   7.  Prominent main pulmonary artery, appearance suggests component of pulmonary arterial hypertension.   8.  Atherosclerosis and atherosclerotic coronary artery disease.      DX-CHEST-LIMITED (1 VIEW)   Final Result         1.  No acute cardiopulmonary disease.   2.  Trace bilateral pleural effusions   3.  Cardiomegaly.   4.  Atherosclerosis.           Assessment/Plan  * Sepsis (HCC)- (present on admission)  Assessment & Plan  -SIRS criteria identified on my evaluation include: Fever, with temperature greater than 100.9 deg F, Tachycardia, with heart rate greater than 90 BPM, and Tachypnea, with respirations greater than 20 per minute  -Clinical indicators of end organ dysfunction include Hypotension with systolic blood pressure less than 90 or MAP less than 65 and Hypotension with decrease in systolic blood pressure of more than 40mmHg  -Source is intra-abdominal versus respiratory  -Sepsis protocol initiated  -Crystalloid Fluid Administration: Fluid resuscitation ordered per standard protocol - 30 mL/kg per current or ideal body weight  -Start IV Zosyn  - Blood culture positive for Pseudomonas, follow sensitivity report  -Order full respiratory panel      GI bleed- (present on admission)  Assessment & Plan  In the setting of alcohol cirrhosis and thrombocytopenia  Continue IV Protonix  Continue close cardiac monitoring  Patient has been started on IV fluid hydration with ns  NPO at midnight  Monitor H&H every 8 hours, transfuse for hemoglobin less than 7  INR abnormal, will give vitamin K and assess response   underwent EGD and Colonoscopy that revealed grade 1 esophageal varices, portal hypertensive gastropathy and mild rectal varices      DM2 (diabetes mellitus, type 2) (HCC)- (present on admission)  Assessment & Plan  - Hypoglycemia protocol  - Diabetic diet  - SSI    Elevated troponin- (present on  admission)  Assessment & Plan  -Troponin 25>> 19.  Likely nonischemic myocardial injury in the setting of sepsis.    -EKG without ST changes.  Patient denies chest pain.    Hx of AKA (above knee amputation), left ([facility])- (present on admission)  Assessment & Plan  -History of left AKA 01/2024.    Seizures ([facility])- (present on admission)  Assessment & Plan  - Continue home gabapentin    Atrial fibrillation with RVR ([facility])- (present on admission)  Assessment & Plan  -A-fib with RVR up to the 150s on admission.  Likely in the setting of sepsis. EKG atrial fibrillation , QTc 484, with diffuse T wave flattening, similar to previous.  -Received diltiazem 18.6 mg IV x 1 in the ED  -Currently rate controlled.  Monitor on telemetry.    ELIZABETH (acute kidney injury) ([facility])- (present on admission)  Assessment & Plan  -Creatinine 1.35, baseline 0.7-0.9.  Likely hypoperfusion in the setting of sepsis complicated by atrial fibrillation with RVR.  -Daily BMP  -Avoid nephrotoxic medications  -Renally dose medications    Normal anion gap metabolic acidosis- (present on admission)  Assessment & Plan  -Bicarb 17 anion gap 9  -Daily BMP    Bacteremia due to Pseudomonas- (present on admission)  Assessment & Plan  Continue IV Zosyn 4.5 g with end date of 5/14  ID consulted  Likely source is intra-abdominal  Unable to do paracentesis due to small volume ascites      Mild intermittent asthma without complication- (present on admission)  Assessment & Plan  -No sign of exacerbation  -Continue home albuterol as needed    Chronic pain syndrome- (present on admission)  Assessment & Plan  - Continue home oxycodone and gabapentin    GERD (gastroesophageal reflux disease)- (present on admission)  Assessment & Plan  - Continue home PPI    Normocytic anemia- (present on admission)  Assessment & Plan  -Hemoglobin 7.8, around baseline ranges from 7-10.  Likely in the setting of cirrhosis.  No sign of acute bleed.  -Daily CBC    Asymptomatic bacteriuria-  (present on admission)  Assessment & Plan  -Patient denies LUTS.  UA with trace ketones, large occult blood, negative nitrite, moderate leukocyte esterase, 21-50 WBC, 21-50 RBC, many bacteria.  - Although low suspicion for UTI, patient is on IV Zosyn for pseudomonas bacteremia     Dyslipidemia- (present on admission)  Assessment & Plan  DC statin, patient's last LDL was 30      Hypertension- (present on admission)  Assessment & Plan  - Hold all antihypertensives and diuretics in the setting of sepsis    Thrombocytopenia (HCC)- (present on admission)  Assessment & Plan  -PLT 86, around baseline ranging from 40s to 100s.  Likely in the setting of cirrhosis.  No sign of acute bleed.  -Daily CBC    Alcoholic cirrhosis (HCC)- (present on admission)  Assessment & Plan  MELD 15  -CT showing cirrhosis with evidence of portal hypertension, moderate scattered abdominal ascites.  Quit drinking 10 years ago.  - Restart furosemide, lactulose and rifaximin  - start on propranolol for portal hypertension           VTE prophylaxis: scd    I have performed a physical exam and reviewed and updated ROS and Plan today (5/11/2025). In review of yesterday's note (5/10/2025), there are no changes except as documented above.      I spent 52 minutes, reviewing the chart, obtaining and/or reviewing separately obtained history. Performing a medically appropriate examination and evaluation.  Counseling and educating the patient. Ordering and reviewing medications, tests, or procedures. Documenting clinical information in EPIC.  Discussing the case with ID and GI.  Independently interpreting results and communicating results to patient. Discussing future disposition of care with patient, RN and case management.

## 2025-05-12 LAB
ALBUMIN SERPL BCP-MCNC: 2 G/DL (ref 3.2–4.9)
ALBUMIN/GLOB SERPL: 0.5 G/DL
ALP SERPL-CCNC: 110 U/L (ref 30–99)
ALT SERPL-CCNC: 6 U/L (ref 2–50)
AMMONIA PLAS-SCNC: 62 UMOL/L (ref 11–45)
ANION GAP SERPL CALC-SCNC: 7 MMOL/L (ref 7–16)
APPEARANCE UR: CLEAR
AST SERPL-CCNC: 19 U/L (ref 12–45)
BACTERIA #/AREA URNS HPF: ABNORMAL /HPF
BILIRUB SERPL-MCNC: 0.8 MG/DL (ref 0.1–1.5)
BILIRUB UR QL STRIP.AUTO: NEGATIVE
BUN SERPL-MCNC: 36 MG/DL (ref 8–22)
CALCIUM ALBUM COR SERPL-MCNC: 8.9 MG/DL (ref 8.5–10.5)
CALCIUM SERPL-MCNC: 7.3 MG/DL (ref 8.5–10.5)
CASTS URNS QL MICRO: ABNORMAL /LPF (ref 0–2)
CHLORIDE SERPL-SCNC: 113 MMOL/L (ref 96–112)
CO2 SERPL-SCNC: 16 MMOL/L (ref 20–33)
COLOR UR: YELLOW
CREAT SERPL-MCNC: 1.43 MG/DL (ref 0.5–1.4)
EPITHELIAL CELLS 1715: ABNORMAL /HPF (ref 0–5)
ERYTHROCYTE [DISTWIDTH] IN BLOOD BY AUTOMATED COUNT: 53 FL (ref 35.9–50)
GFR SERPLBLD CREATININE-BSD FMLA CKD-EPI: 41 ML/MIN/1.73 M 2
GLOBULIN SER CALC-MCNC: 3.8 G/DL (ref 1.9–3.5)
GLUCOSE SERPL-MCNC: 174 MG/DL (ref 65–99)
GLUCOSE UR STRIP.AUTO-MCNC: NEGATIVE MG/DL
HCT VFR BLD AUTO: 28 % (ref 37–47)
HGB BLD-MCNC: 8.5 G/DL (ref 12–16)
KETONES UR STRIP.AUTO-MCNC: NEGATIVE MG/DL
LEUKOCYTE ESTERASE UR QL STRIP.AUTO: ABNORMAL
MCH RBC QN AUTO: 27.5 PG (ref 27–33)
MCHC RBC AUTO-ENTMCNC: 30.4 G/DL (ref 32.2–35.5)
MCV RBC AUTO: 90.6 FL (ref 81.4–97.8)
MICRO URNS: ABNORMAL
NITRITE UR QL STRIP.AUTO: NEGATIVE
PH UR STRIP.AUTO: 5.5 [PH] (ref 5–8)
PLATELET # BLD AUTO: 84 K/UL (ref 164–446)
PLATELETS.RETICULATED NFR BLD AUTO: 0.9 % (ref 0.6–13.1)
PMV BLD AUTO: 9.6 FL (ref 9–12.9)
POTASSIUM SERPL-SCNC: 4.8 MMOL/L (ref 3.6–5.5)
PROT SERPL-MCNC: 5.8 G/DL (ref 6–8.2)
PROT UR QL STRIP: NEGATIVE MG/DL
RBC # BLD AUTO: 3.09 M/UL (ref 4.2–5.4)
RBC # URNS HPF: ABNORMAL /HPF (ref 0–2)
RBC UR QL AUTO: ABNORMAL
SODIUM SERPL-SCNC: 136 MMOL/L (ref 135–145)
SODIUM UR-SCNC: 89 MMOL/L
SP GR UR STRIP.AUTO: 1.01
UROBILINOGEN UR STRIP.AUTO-MCNC: 0.2 EU/DL
WBC # BLD AUTO: 6.3 K/UL (ref 4.8–10.8)
WBC #/AREA URNS HPF: ABNORMAL /HPF
YEAST BUDDING URNS QL: PRESENT /HPF

## 2025-05-12 PROCEDURE — 85027 COMPLETE CBC AUTOMATED: CPT

## 2025-05-12 PROCEDURE — 99232 SBSQ HOSP IP/OBS MODERATE 35: CPT | Performed by: NURSE PRACTITIONER

## 2025-05-12 PROCEDURE — 36415 COLL VENOUS BLD VENIPUNCTURE: CPT

## 2025-05-12 PROCEDURE — 80053 COMPREHEN METABOLIC PANEL: CPT

## 2025-05-12 PROCEDURE — 99233 SBSQ HOSP IP/OBS HIGH 50: CPT | Performed by: INTERNAL MEDICINE

## 2025-05-12 PROCEDURE — 97602 WOUND(S) CARE NON-SELECTIVE: CPT

## 2025-05-12 PROCEDURE — 770020 HCHG ROOM/CARE - TELE (206)

## 2025-05-12 PROCEDURE — 700102 HCHG RX REV CODE 250 W/ 637 OVERRIDE(OP): Performed by: INTERNAL MEDICINE

## 2025-05-12 PROCEDURE — 84300 ASSAY OF URINE SODIUM: CPT

## 2025-05-12 PROCEDURE — 700105 HCHG RX REV CODE 258: Performed by: INTERNAL MEDICINE

## 2025-05-12 PROCEDURE — 700111 HCHG RX REV CODE 636 W/ 250 OVERRIDE (IP): Mod: JZ | Performed by: INTERNAL MEDICINE

## 2025-05-12 PROCEDURE — 81001 URINALYSIS AUTO W/SCOPE: CPT

## 2025-05-12 PROCEDURE — 82140 ASSAY OF AMMONIA: CPT

## 2025-05-12 PROCEDURE — 97530 THERAPEUTIC ACTIVITIES: CPT

## 2025-05-12 PROCEDURE — 85055 RETICULATED PLATELET ASSAY: CPT

## 2025-05-12 PROCEDURE — A9270 NON-COVERED ITEM OR SERVICE: HCPCS | Performed by: INTERNAL MEDICINE

## 2025-05-12 PROCEDURE — A9270 NON-COVERED ITEM OR SERVICE: HCPCS | Performed by: STUDENT IN AN ORGANIZED HEALTH CARE EDUCATION/TRAINING PROGRAM

## 2025-05-12 PROCEDURE — 700102 HCHG RX REV CODE 250 W/ 637 OVERRIDE(OP): Performed by: STUDENT IN AN ORGANIZED HEALTH CARE EDUCATION/TRAINING PROGRAM

## 2025-05-12 RX ORDER — SPIRONOLACTONE 25 MG/1
25 TABLET ORAL
Status: DISCONTINUED | OUTPATIENT
Start: 2025-05-12 | End: 2025-05-12

## 2025-05-12 RX ORDER — LACTULOSE 10 G/15ML
45 SOLUTION ORAL 4 TIMES DAILY
Status: DISCONTINUED | OUTPATIENT
Start: 2025-05-12 | End: 2025-05-13 | Stop reason: HOSPADM

## 2025-05-12 RX ORDER — CARVEDILOL 3.12 MG/1
3.12 TABLET ORAL 2 TIMES DAILY WITH MEALS
Status: DISCONTINUED | OUTPATIENT
Start: 2025-05-12 | End: 2025-05-13 | Stop reason: HOSPADM

## 2025-05-12 RX ADMIN — GABAPENTIN 200 MG: 100 CAPSULE ORAL at 16:40

## 2025-05-12 RX ADMIN — PIPERACILLIN AND TAZOBACTAM 4.5 G: 4; .5 INJECTION, POWDER, FOR SOLUTION INTRAVENOUS at 05:01

## 2025-05-12 RX ADMIN — RIFAXIMIN 550 MG: 550 TABLET ORAL at 05:09

## 2025-05-12 RX ADMIN — PANTOPRAZOLE SODIUM 40 MG: 40 INJECTION, POWDER, FOR SOLUTION INTRAVENOUS at 05:09

## 2025-05-12 RX ADMIN — NYSTATIN: 100000 POWDER TOPICAL at 18:00

## 2025-05-12 RX ADMIN — OXYCODONE 5 MG: 5 TABLET ORAL at 19:53

## 2025-05-12 RX ADMIN — CARVEDILOL 3.12 MG: 3.12 TABLET, FILM COATED ORAL at 16:40

## 2025-05-12 RX ADMIN — GABAPENTIN 200 MG: 100 CAPSULE ORAL at 05:09

## 2025-05-12 RX ADMIN — NYSTATIN: 100000 POWDER TOPICAL at 05:10

## 2025-05-12 RX ADMIN — OXYCODONE 5 MG: 5 TABLET ORAL at 05:14

## 2025-05-12 RX ADMIN — LACTULOSE 45 ML: 10 SOLUTION ORAL at 16:39

## 2025-05-12 RX ADMIN — PROPRANOLOL HYDROCHLORIDE 10 MG: 10 TABLET ORAL at 05:09

## 2025-05-12 RX ADMIN — PIPERACILLIN AND TAZOBACTAM 4.5 G: 4; .5 INJECTION, POWDER, FOR SOLUTION INTRAVENOUS at 19:56

## 2025-05-12 RX ADMIN — PANTOPRAZOLE SODIUM 40 MG: 40 INJECTION, POWDER, FOR SOLUTION INTRAVENOUS at 16:41

## 2025-05-12 RX ADMIN — PIPERACILLIN AND TAZOBACTAM 4.5 G: 4; .5 INJECTION, POWDER, FOR SOLUTION INTRAVENOUS at 11:33

## 2025-05-12 RX ADMIN — RIFAXIMIN 550 MG: 550 TABLET ORAL at 16:40

## 2025-05-12 RX ADMIN — OXYCODONE 5 MG: 5 TABLET ORAL at 11:31

## 2025-05-12 RX ADMIN — SPIRONOLACTONE 25 MG: 25 TABLET ORAL at 11:31

## 2025-05-12 RX ADMIN — FUROSEMIDE 40 MG: 40 TABLET ORAL at 05:09

## 2025-05-12 ASSESSMENT — GAIT ASSESSMENTS: GAIT LEVEL OF ASSIST: UNABLE TO PARTICIPATE

## 2025-05-12 ASSESSMENT — ENCOUNTER SYMPTOMS
COUGH: 0
VOMITING: 0
BLURRED VISION: 0
BLOOD IN STOOL: 0
BACK PAIN: 0
CONSTIPATION: 0
DIARRHEA: 0
SHORTNESS OF BREATH: 0
NAUSEA: 1
MYALGIAS: 1
NAUSEA: 0
ABDOMINAL PAIN: 1
ABDOMINAL PAIN: 0
PSYCHIATRIC NEGATIVE: 1
HEARTBURN: 0
CHILLS: 0
DEPRESSION: 0
DIZZINESS: 0
BLOOD IN STOOL: 1
EYES NEGATIVE: 1
WEAKNESS: 1
FEVER: 0

## 2025-05-12 ASSESSMENT — COGNITIVE AND FUNCTIONAL STATUS - GENERAL
WALKING IN HOSPITAL ROOM: TOTAL
CLIMB 3 TO 5 STEPS WITH RAILING: TOTAL
MOVING TO AND FROM BED TO CHAIR: A LOT
MOVING FROM LYING ON BACK TO SITTING ON SIDE OF FLAT BED: A LITTLE
STANDING UP FROM CHAIR USING ARMS: A LOT
TURNING FROM BACK TO SIDE WHILE IN FLAT BAD: A LITTLE
SUGGESTED CMS G CODE MODIFIER MOBILITY: CL
MOBILITY SCORE: 12

## 2025-05-12 ASSESSMENT — PAIN DESCRIPTION - PAIN TYPE
TYPE: ACUTE PAIN

## 2025-05-12 ASSESSMENT — FIBROSIS 4 INDEX: FIB4 SCORE: 6

## 2025-05-12 NOTE — DISCHARGE PLANNING
Case Management Discharge Planning    Admission Date: 5/6/2025  GMLOS: 4.9  ALOS: 5    6-Clicks ADL Score: 14  6-Clicks Mobility Score: (P) 12  PT and/or OT Eval ordered: Yes  Post-acute Referrals Ordered: Yes  Post-acute Choice Obtained: Yes  Has referral(s) been sent to post-acute provider:  Yes      Anticipated Discharge Dispo: Discharge Disposition: D/T to SNF with Medicare cert in anticipation of skilled care (03)    DME Needed: No    Action(s) Taken: Updated Provider/Nurse on Discharge Plan    Escalations Completed: None    Medically Clear: Yes    Next Steps: Pt has been cleared for transfer back to [place]. Todd working on gurney transport at 1230. Pt aware of plan and agrees.    Barriers to Discharge: None    Is the patient up for discharge tomorrow: No

## 2025-05-12 NOTE — CARE PLAN
The patient is Stable - Low risk of patient condition declining or worsening    Shift Goals  Clinical Goals: Q2 turns, abx, VSS  Patient Goals: comfort, warmth  Family Goals: not present    Progress made toward(s) clinical / shift goals:    Problem: Knowledge Deficit - Standard  Goal: Patient and family/care givers will demonstrate understanding of plan of care, disease process/condition, diagnostic tests and medications  Outcome: Progressing     Problem: Hemodynamics  Goal: Patient's hemodynamics, fluid balance and neurologic status will be stable or improve  Outcome: Progressing     Problem: Fluid Volume  Goal: Fluid volume balance will be maintained  Outcome: Progressing     Problem: Urinary - Renal Perfusion  Goal: Ability to achieve and maintain adequate renal perfusion and functioning will improve  Outcome: Progressing     Problem: Respiratory  Goal: Patient will achieve/maintain optimum respiratory ventilation and gas exchange  Outcome: Progressing     Problem: Fall Risk  Goal: Patient will remain free from falls  Outcome: Progressing     Problem: Pain - Standard  Goal: Alleviation of pain or a reduction in pain to the patient’s comfort goal  Outcome: Progressing

## 2025-05-12 NOTE — PROGRESS NOTES
..Gastroenterology Progress Note               Author:  Connie Aquino, JERAD,  APRN Date & Time Created: 5/12/2025 7:22 AM       Patient ID:  Name:             April Alicia  YOB: 1960  Age:                 65 y.o.  female  MRN:               [id number]    Medical Decision Making, by Problem:  Active Hospital Problems    Diagnosis     Sepsis (HCC) [A41.9]     Hx of AKA (above knee amputation), left (HCC) [Z89.612]     Elevated troponin [R79.89]     DM2 (diabetes mellitus, type 2) (HCC) [E11.9]     Seizures (HCC) [R56.9]     Atrial fibrillation with RVR (HCC) [I48.91]     Normal anion gap metabolic acidosis [E87.20]     ELIZABETH (acute kidney injury) (HCC) [N17.9]     Bacteremia due to Pseudomonas [R78.81, B96.5]     Normocytic anemia [D64.9]     GERD (gastroesophageal reflux disease) [K21.9]     Asymptomatic bacteriuria [R82.71]     Mild intermittent asthma without complication [J45.20]     Dyslipidemia [E78.5]     Thrombocytopenia (HCC) [D69.6]     Hypertension [I10]     Alcoholic cirrhosis (HCC) [K70.30]     Chronic pain syndrome [G89.4]     GI bleed [K92.2]      Presenting Chief Complaint:  Hematochezia and melena    HISTORY OF PRESENT ILLNESS:  April Alicia is a 65 y.o. female with history of hepatitis C (treated 3 years ago) and fatty liver induced cirrhosis, hepatic encephalopathy on lactulose and rifaxamin, DM2, COPD, atrial fibrillation, and left BKA who resides at [facility] who came to the ED on 5/6/2025 with flu like symptoms. She is being treated for pseudomonas bacteremia with IV Zosyn. While inpatient, she developed bloody bowel movements which she reported had been going on for a week with lower abdominal pain. Her hemoglobin was 6.7 on 5/9 and she received one unit PRBC.      Patient describes that she has noticed black stools for the past several weeks with associated diffuse abdominal tenderness. She reports bloating but no weight loss. She occasionally has  dysphagia. She began noticing blood in her stools about a week ago. She does not take blood thinners or NSAIDs. She is on lasix but not spironolactone.      VSS. Hgb 8.0. Labs pending for today  CT on admission with liver cirrhosis, abdominal wall varices with recanalization of the umbilical vein, splenomegaly, moderate scattered abdominal ascites. Cardiomegaly with likely PAH, atherosclerosis, and bilateral pleural effusions.     GI history:  10/2015 - Colonoscopy with polyp removal  11/2024: EGD with portal hypertensive gastropathy    5/11/2025: EGD and colonoscopy  - Grade 1 esophageal varices  - Portal hypertensive gastropathy  - Mild rectal varix x 1    MELD 3.0 - 18    Interval History:  5/12/2025: Patient seen. Feels better. One brown BM overnight. Hgb stable at 8.5, Plt 84. Creatinine worsening, baseline around 0.8, now 1.43. Urinalysis with +occult blood, mod LE and many bacteria. Urine sodium 89    Hospital Medications:  Current Facility-Administered Medications   Medication Dose Frequency Provider Last Rate Last Admin    propranolol (Inderal) tablet 10 mg  10 mg TWICE DAILY Shashank Gray M.D.   10 mg at 05/12/25 0509    nystatin (Mycostatin) powder   BID KATE CabreraN.P.   Given at 05/12/25 0510    pantoprazole (Protonix) injection 40 mg  40 mg BID Shashank Gray M.D.   40 mg at 05/12/25 0509    NS infusion   Continuous Shashank Gray M.D. 30 mL/hr at 05/11/25 1357 New Bag at 05/11/25 1357    acetaminophen (Tylenol) tablet 650 mg  650 mg Q6HRS PRN Edmund Malone M.D.   650 mg at 05/08/25 2206    ondansetron (Zofran) syringe/vial injection 4 mg  4 mg Q4HRS PRN Edmund Malone M.D.        Or    ondansetron (Zofran ODT) dispertab 4 mg  4 mg Q4HRS PRN Edmund Malone M.D.        albuterol inhaler 2 Puff  2 Puff Q4HRS PRN Edmund Malone M.D.        atorvastatin (Lipitor) tablet 20 mg  20 mg Nightly Edmund Malone M.D.   20 mg at 05/11/25 2055    furosemide (Lasix) tablet 40 mg  40 mg  "DAILY Shashank Gray M.D.   40 mg at 05/12/25 0509    gabapentin (Neurontin) capsule 200 mg  200 mg BID Edmund Malone M.D.   200 mg at 05/12/25 0509    lactulose 20 GM/30ML solution 30 mL  30 mL 4X/DAY Shashank Gray M.D.   30 mL at 05/11/25 2054    oxyCODONE immediate-release (Roxicodone) tablet 5 mg  5 mg Q6HRS PRN Edmund Malone M.D.   5 mg at 05/12/25 0514    riFAXIMin (Xifaxan) tablet 550 mg  550 mg BID Shashank Gray M.D.   550 mg at 05/12/25 0509    dextrose 50 % (D50W) injection 25 g  25 g Q15 MIN PRN Shashank Gray M.D.        Metoprolol Tartrate (Lopressor) injection 5 mg  5 mg Q5 MIN PRN Shashank Gray M.D.   5 mg at 05/07/25 1818    piperacillin-tazobactam (Zosyn) 4.5 g in  mL IVPB  4.5 g Q8HRS Shashank Gray M.D. 25 mL/hr at 05/12/25 0501 4.5 g at 05/12/25 0501   Last reviewed on 5/6/2025 10:07 PM by Demetrius Gr       Review of Systems:  Review of Systems   Constitutional:  Negative for chills, fever and malaise/fatigue.   HENT:  Positive for hearing loss.    Eyes:  Negative for blurred vision.   Respiratory:  Negative for cough and shortness of breath.    Cardiovascular:  Negative for chest pain and leg swelling.   Gastrointestinal:  Negative for abdominal pain, blood in stool, constipation, diarrhea, heartburn, melena, nausea and vomiting.   Genitourinary:  Negative for dysuria.   Musculoskeletal:  Negative for back pain.   Skin:  Negative for rash.   Neurological:  Positive for weakness. Negative for dizziness.   Psychiatric/Behavioral:  Negative for depression.    All other systems reviewed and are negative.    Vital signs:  Weight/BMI: Body mass index is 35.7 kg/m².  /65   Pulse (!) 110   Temp 37.1 °C (98.8 °F) (Temporal)   Resp 17   Ht 1.702 m (5' 7\")   Wt 103 kg (227 lb 15.3 oz)   SpO2 94%   Vitals:    05/11/25 2319 05/12/25 0319 05/12/25 0400 05/12/25 0509   BP: 130/56 109/70  105/65   Pulse: 77 92  (!) 110   Resp: 18 17     Temp: 36.5 °C (97.7 °F) 37.1 °C (98.8 °F)   "   TempSrc: Temporal Temporal     SpO2: 95% 94%     Weight:   103 kg (227 lb 15.3 oz)    Height:         Oxygen Therapy:  Pulse Oximetry: 94 %, O2 (LPM): 0, O2 Delivery Device: None - Room Air    Intake/Output Summary (Last 24 hours) at 5/12/2025 0722  Last data filed at 5/11/2025 2300  Gross per 24 hour   Intake 2050 ml   Output 1950 ml   Net 100 ml       Physical Exam  Vitals and nursing note reviewed.   Constitutional:       General: She is not in acute distress.     Appearance: She is obese. She is ill-appearing.   HENT:      Head: Normocephalic and atraumatic.      Right Ear: External ear normal.      Left Ear: External ear normal.      Nose: Nose normal.      Mouth/Throat:      Mouth: Mucous membranes are moist.      Pharynx: Oropharynx is clear.      Comments: Edentulous   Eyes:      General: No scleral icterus.  Cardiovascular:      Rate and Rhythm: Normal rate. Rhythm irregular.      Pulses: Normal pulses.      Heart sounds: Normal heart sounds.   Pulmonary:      Effort: Pulmonary effort is normal. No respiratory distress.      Breath sounds: Normal breath sounds.   Abdominal:      General: Abdomen is flat. Bowel sounds are normal. There is no distension.      Palpations: Abdomen is soft.      Tenderness: There is no abdominal tenderness.   Musculoskeletal:         General: Normal range of motion.      Cervical back: Normal range of motion.      Comments: Left AKA  1+ edema RLE   Skin:     General: Skin is warm and dry.      Capillary Refill: Capillary refill takes less than 2 seconds.   Neurological:      Mental Status: She is alert and oriented to person, place, and time.   Psychiatric:         Mood and Affect: Mood normal.         Behavior: Behavior normal.         Labs:  Recent Labs     05/10/25  1413 05/11/25  0233 05/12/25  0119   SODIUM 137 136 136   POTASSIUM 4.5 4.4 4.8   CHLORIDE 114* 112 113*   CO2 15* 15* 16*   BUN 43* 39* 36*   CREATININE 1.42* 1.46* 1.43*   CALCIUM 7.6* 7.4* 7.3*     Recent  Labs     05/10/25  1413 05/11/25  0233 05/12/25  0119   ALTSGPT 7 6 6   ASTSGOT 17 15 19   ALKPHOSPHAT 121* 106* 110*   TBILIRUBIN 0.9 0.9 0.8   GLUCOSE 125* 94 174*     Recent Labs     05/10/25  1413 05/11/25  0233 05/12/25  0119   WBC 7.3 6.9 6.3   ASTSGOT 17 15 19   ALTSGPT 7 6 6   ALKPHOSPHAT 121* 106* 110*   TBILIRUBIN 0.9 0.9 0.8     Recent Labs     05/10/25  1413 05/10/25  2337 05/11/25  0233 05/12/25  0119   RBC 3.12*  --  3.17* 3.09*   HEMOGLOBIN 8.9* 9.6* 8.8* 8.5*   HEMATOCRIT 29.0* 30.8* 29.6* 28.0*   PLATELETCT 72*  --  85* 84*   PROTHROMBTM 18.4*  --   --   --    INR 1.53*  --   --   --      Recent Results (from the past 24 hours)   POCT glucose device results    Collection Time: 05/11/25 10:36 AM   Result Value Ref Range    POC Glucose, Blood 83 65 - 99 mg/dL   CBC WITHOUT DIFFERENTIAL    Collection Time: 05/12/25  1:19 AM   Result Value Ref Range    WBC 6.3 4.8 - 10.8 K/uL    RBC 3.09 (L) 4.20 - 5.40 M/uL    Hemoglobin 8.5 (L) 12.0 - 16.0 g/dL    Hematocrit 28.0 (L) 37.0 - 47.0 %    MCV 90.6 81.4 - 97.8 fL    MCH 27.5 27.0 - 33.0 pg    MCHC 30.4 (L) 32.2 - 35.5 g/dL    RDW 53.0 (H) 35.9 - 50.0 fL    Platelet Count 84 (L) 164 - 446 K/uL    MPV 9.6 9.0 - 12.9 fL   Comp Metabolic Panel    Collection Time: 05/12/25  1:19 AM   Result Value Ref Range    Sodium 136 135 - 145 mmol/L    Potassium 4.8 3.6 - 5.5 mmol/L    Chloride 113 (H) 96 - 112 mmol/L    Co2 16 (L) 20 - 33 mmol/L    Anion Gap 7.0 7.0 - 16.0    Glucose 174 (H) 65 - 99 mg/dL    Bun 36 (H) 8 - 22 mg/dL    Creatinine 1.43 (H) 0.50 - 1.40 mg/dL    Calcium 7.3 (L) 8.5 - 10.5 mg/dL    Correct Calcium 8.9 8.5 - 10.5 mg/dL    AST(SGOT) 19 12 - 45 U/L    ALT(SGPT) 6 2 - 50 U/L    Alkaline Phosphatase 110 (H) 30 - 99 U/L    Total Bilirubin 0.8 0.1 - 1.5 mg/dL    Albumin 2.0 (L) 3.2 - 4.9 g/dL    Total Protein 5.8 (L) 6.0 - 8.2 g/dL    Globulin 3.8 (H) 1.9 - 3.5 g/dL    A-G Ratio 0.5 g/dL   IMMATURE PLT FRACTION    Collection Time: 05/12/25  1:19 AM    Result Value Ref Range    Imm. Plt Fraction 0.9 0.6 - 13.1 %   ESTIMATED GFR    Collection Time: 05/12/25  1:19 AM   Result Value Ref Range    GFR (CKD-EPI) 41 (A) >60 mL/min/1.73 m 2       Radiology Review:  US-ABDOMEN LTD (SOFT TISSUE)   Final Result         1. Small volume ascites. No safe access window for paracentesis at this time.         CT-CHEST,ABDOMEN,PELVIS WITH   Final Result         1.  Cirrhosis with recanalization of the umbilical vein and prominent anterior abdominal wall varices, compatible with portal hypertension.   2.  Splenomegaly.   3.  Moderate scattered abdominal ascites.   4.  Small bilateral pleural effusions, left greater than right.   5.  Linear densities the bilateral lung bases favor changes of atelectasis.   6.  Cardiomegaly.   7.  Prominent main pulmonary artery, appearance suggests component of pulmonary arterial hypertension.   8.  Atherosclerosis and atherosclerotic coronary artery disease.      DX-CHEST-LIMITED (1 VIEW)   Final Result         1.  No acute cardiopulmonary disease.   2.  Trace bilateral pleural effusions   3.  Cardiomegaly.   4.  Atherosclerosis.            MDM (Data Review):   -Records reviewed and summarized in current documentation  -I personally reviewed and interpreted the laboratory results  -I personally reviewed the radiology images    Assessment/Recommendations:  Hematochezia  Acute blood loss anemia  Hepatitis C (treated) and fatty liver induced cirrhosis with portal hypertension  Abdominal ascites - not enough fluid for paracentesis  Hepatic encephalopathy - controlled  Splenomegaly and thrombocytopenia  Elevated INR   Hypoalbuminemia  Iron deficiency  Cardiomegaly with pulmonary hypertension, RVSP 25 mmHg on echo Jan 2024  ELIZABETH      Recommendations:  Trend H/H and transfuse for hgb <7  Continue lactulose and rifaxamin   Continue lasix 40 mg, recommend adding spironolactone 100 mg for ratio of 40/100  Started on propanolol, the GI literature supports  carvedilol due to alpha-1 adrenergic blocking activity contributing to greater effectiveness  ELIZABETH due to infection, urine sodium WNL     [facility] team will sign off. Please reconsult if needed.    Discussed with patient, RN, Dr. Gray, Dr. Reeves    ..Connie Aquino, DNP,  APRN    Core Quality Measures   Reviewed items::  Labs, Medications and Radiology reports reviewed

## 2025-05-12 NOTE — PROGRESS NOTES
[facility] Medicine Daily Progress Note    Date of Service  5/12/2025    Chief Complaint  April Alicia is a 65 y.o. female admitted 5/6/2025 with fevers, chills, cough.    Hospital Course  Patient is a 65-year-old female with past medical history of alcoholic cirrhosis, migraine, HTN, paroxysmal atrial fibrillation not on anticoagulation, GERD, chronic pain, NIDDM 2, seizure disorder, asthma, status post left AKA that presents with 1 day history of malaise, dry cough, fever, chills.     Patient states that they have been having a dry cough, full body soreness, fever, chills since yesterday.  Denies any lower urinary tract symptoms.  States that they would always have abdominal pain which is unchanged.     In the ED, BP 90s to 130s/50s to 90s, HR 60s to 150s, RR 11-21, Tmax 100.8 F, saturating well on room air.  Blood cultures x 2 drawn in the ED.  Received LR 1 L bolus x 1, diltiazem 18.6 mg IV x 1.  WBC 6.2, hemoglobin 7.8, PLT 86, bicarb 17, anion gap 9, BUN 45, creatinine 1.35, alk phos 211, lactic acid 1.6, troponin 25>> 19, NT proBNP 894.  COVID/flu/RSV negative.  UA with trace ketones, large occult blood, negative nitrite, moderate leukocyte esterase, 21-50 WBC, 21-50 RBC, many bacteria.  Chest x-ray with trace bilateral pleural effusions.  CT chest abdomen pelvis with contrast showing cirrhosis with recannulization of the umbilical vein and prominent anterior abdominal wall varices compatible with portal hypertension, splenomegaly, moderate scattered abdominal ascites, small bilateral pleural effusions left greater than right, prominent main pulmonary artery suggesting pulmonary arterial hypertension.  EKG atrial fibrillation , QTc 484, with diffuse T wave flattening, similar to previous.    Interval Problem Update  5/7 patient reports symptoms of subjective fevers, chills, nausea, vomiting, abdominal pain, shallow breathing. Reports feeling very tired and fatigued and unable to ambulate.  She  denies any dysuria.  She is noted to be anemic with a hemoglobin of 7.8 and thrombocytopenic with a platelet count of 86.  Monitor CBC and transfuse for hemoglobin less than 7 or platelets less than 10 or if she develops active bleeding.  Ultrasound-guided paracentesis is pending.  Continue empiric IV ceftriaxone and Flagyl    5/8 Patient as blood cultures positive for Pseudomonas.  Switch antibiotics to IV Zosyn.  Likely source is intra-abdominal.  She does report some lower abdominal pain.  CT chest, abdomen and pelvis revealed evidence of cirrhosis and portal hypertension with scattered abdominal ascites and small bilateral pleural effusions.  Will consult ID.  Ultrasound reveals small volume ascites and no safe access window for paracentesis.    5/9 Patient reported bloody bowel movements that she states have been going on for the past week.  Her hemoglobin dropped to 6.7 this morning and she received a unit of PRBC.  Platelets have been low at 60.  Avoid aspirin or anticoagulation.  I will start the patient on IV Protonix.  I have consulted and discussed the case with GI who will evaluate the patient and plan for EGD/colonoscopy tomorrow.  Continue cardiac monitoring.  I also discussed the case with ID and at this point we will continue with IV Zosyn for treatment of her Pseudomonas bacteremia.    5/10 patient continues to report some lower abdominal pain.  Her blood pressure is improved to 133/82.  She has been bradycardic on octreotide therefore will discontinue octreotide.  Continue cardiac monitoring.  Her hemoglobin returned at 8.9.  Platelets are low at 72.  I consulted and discussed the case with GI, plan for EGD and colonoscopy tomorrow.  N.p.o. at midnight.  C. difficile was negative.  Pseudomonas is pansensitive, continue IV Zosyn for now and may transition to Cipro on discharge with stop date of 5/14/2025 per ID.    5/11 patient underwent EGD and Colonoscopy that revealed grade 1 esophageal varices,  portal hypertensive gastropathy and mild rectal varices.  Will continue to monitor closely for ongoing bleeding.  Monitor CBC and transfuse for hemoglobin less than 7.  Advance diet to 2 g sodium striction.  Will add on propranolol 10 g twice daily for portal hypertension and titrate up as tolerated    5/12 Pt appears more somnolent today. She has worsening swelling of her right lower extremity. Her ammonia level returned elevated.  Increase lactulose to 45 ml 4 times/day. Unable to start aldactone at this time due to ELIZABETH. Monitor BMP    I have discussed this patient's plan of care and discharge plan at IDT rounds today with Case Management, Nursing, Nursing leadership, and other members of the IDT team.    Consultants/Specialty  None    Code Status  Full Code    Disposition  Not medically cleared  I have placed the appropriate orders for post-discharge needs.    Review of Systems  Review of Systems   Constitutional:  Positive for malaise/fatigue. Negative for chills and fever.   HENT: Negative.     Eyes: Negative.    Respiratory:  Negative for cough and shortness of breath.    Cardiovascular:  Negative for chest pain.   Gastrointestinal:  Positive for abdominal pain, blood in stool, melena and nausea. Negative for vomiting.   Genitourinary:  Negative for dysuria.   Musculoskeletal:  Positive for myalgias.   Skin: Negative.    Neurological:  Positive for weakness (Generalized). Negative for dizziness.   Endo/Heme/Allergies: Negative.    Psychiatric/Behavioral: Negative.          Physical Exam  Temp:  [36.5 °C (97.7 °F)-37.1 °C (98.8 °F)] 37.1 °C (98.8 °F)  Pulse:  [] 64  Resp:  [16-20] 17  BP: ()/(50-99) 108/70  SpO2:  [92 %-99 %] 92 %    Physical Exam  Vitals and nursing note reviewed.   Constitutional:       Appearance: She is obese. She is ill-appearing.   HENT:      Head: Normocephalic and atraumatic.      Nose: Nose normal.      Mouth/Throat:      Mouth: Mucous membranes are dry.   Eyes:       Pupils: Pupils are equal, round, and reactive to light.   Cardiovascular:      Rate and Rhythm: Normal rate. Rhythm irregular.      Pulses: Normal pulses.      Heart sounds: Normal heart sounds.   Pulmonary:      Effort: Pulmonary effort is normal.      Breath sounds: No rhonchi.   Abdominal:      General: There is distension.      Tenderness: There is abdominal tenderness.   Musculoskeletal:         General: Normal range of motion.      Right lower leg: Edema present.      Left lower leg: Edema present.   Skin:     General: Skin is warm.      Capillary Refill: Capillary refill takes less than 2 seconds.   Neurological:      General: No focal deficit present.      Mental Status: She is alert.   Psychiatric:         Behavior: Behavior normal.         Fluids    Intake/Output Summary (Last 24 hours) at 5/12/2025 1020  Last data filed at 5/11/2025 2300  Gross per 24 hour   Intake 2050 ml   Output 1550 ml   Net 500 ml        Laboratory  Recent Labs     05/10/25  1413 05/10/25  2337 05/11/25  0233 05/12/25  0119   WBC 7.3  --  6.9 6.3   RBC 3.12*  --  3.17* 3.09*   HEMOGLOBIN 8.9* 9.6* 8.8* 8.5*   HEMATOCRIT 29.0* 30.8* 29.6* 28.0*   MCV 92.9  --  93.4 90.6   MCH 28.5  --  27.8 27.5   MCHC 30.7*  --  29.7* 30.4*   RDW 54.6*  --  55.0* 53.0*   PLATELETCT 72*  --  85* 84*   MPV 12.8  --  11.3 9.6     Recent Labs     05/10/25  1413 05/11/25  0233 05/12/25  0119   SODIUM 137 136 136   POTASSIUM 4.5 4.4 4.8   CHLORIDE 114* 112 113*   CO2 15* 15* 16*   GLUCOSE 125* 94 174*   BUN 43* 39* 36*   CREATININE 1.42* 1.46* 1.43*   CALCIUM 7.6* 7.4* 7.3*     Recent Labs     05/10/25  1413   INR 1.53*               Imaging  US-ABDOMEN LTD (SOFT TISSUE)   Final Result         1. Small volume ascites. No safe access window for paracentesis at this time.         CT-CHEST,ABDOMEN,PELVIS WITH   Final Result         1.  Cirrhosis with recanalization of the umbilical vein and prominent anterior abdominal wall varices, compatible with portal  hypertension.   2.  Splenomegaly.   3.  Moderate scattered abdominal ascites.   4.  Small bilateral pleural effusions, left greater than right.   5.  Linear densities the bilateral lung bases favor changes of atelectasis.   6.  Cardiomegaly.   7.  Prominent main pulmonary artery, appearance suggests component of pulmonary arterial hypertension.   8.  Atherosclerosis and atherosclerotic coronary artery disease.      DX-CHEST-LIMITED (1 VIEW)   Final Result         1.  No acute cardiopulmonary disease.   2.  Trace bilateral pleural effusions   3.  Cardiomegaly.   4.  Atherosclerosis.           Assessment/Plan  * Sepsis (HCC)- (present on admission)  Assessment & Plan  -SIRS criteria identified on my evaluation include: Fever, with temperature greater than 100.9 deg F, Tachycardia, with heart rate greater than 90 BPM, and Tachypnea, with respirations greater than 20 per minute  -Clinical indicators of end organ dysfunction include Hypotension with systolic blood pressure less than 90 or MAP less than 65 and Hypotension with decrease in systolic blood pressure of more than 40mmHg  -Source is intra-abdominal versus respiratory  -Sepsis protocol initiated  -Crystalloid Fluid Administration: Fluid resuscitation ordered per standard protocol - 30 mL/kg per current or ideal body weight  -Start IV Zosyn  - Blood culture positive for Pseudomonas, pan sensitive. Ok for cipro on discharge per ID       GI bleed- (present on admission)  Assessment & Plan  In the setting of alcohol cirrhosis and thrombocytopenia  Continue IV Protonix  Continue close cardiac monitoring  Patient has been started on IV fluid hydration with ns  NPO at midnight  Monitor H&H every 8 hours, transfuse for hemoglobin less than 7  INR abnormal, will give vitamin K and assess response   underwent EGD and Colonoscopy that revealed grade 1 esophageal varices, portal hypertensive gastropathy and mild rectal varices      DM2 (diabetes mellitus, type 2) (HCC)-  (present on admission)  Assessment & Plan  - Hypoglycemia protocol  - Diabetic diet  - SSI    Elevated troponin- (present on admission)  Assessment & Plan  -Troponin 25>> 19.  Likely nonischemic myocardial injury in the setting of sepsis.    -EKG without ST changes.  Patient denies chest pain.    Hx of AKA (above knee amputation), left (HCC)- (present on admission)  Assessment & Plan  -History of left AKA 01/2024.    Seizures (HCC)- (present on admission)  Assessment & Plan  - Continue home gabapentin    Atrial fibrillation with RVR (HCC)- (present on admission)  Assessment & Plan  -A-fib with RVR up to the 150s on admission.  Likely in the setting of sepsis. EKG atrial fibrillation , QTc 484, with diffuse T wave flattening, similar to previous.  -Received diltiazem 18.6 mg IV x 1 in the ED  -Currently rate controlled.  Monitor on telemetry.    ELIZABETH (acute kidney injury) (HCC)- (present on admission)  Assessment & Plan  ELIZABETH on CKD stage III in the setting of sepsis and cirrhosis   -Daily BMP  -Renally dose medications    Normal anion gap metabolic acidosis- (present on admission)  Assessment & Plan  resolved  Daily BMP    Bacteremia due to Pseudomonas- (present on admission)  Assessment & Plan  Continue IV Zosyn 4.5 g with end date of 5/14. Ok to switch to cipro on discharge   ID consulted  Likely source is intra-abdominal  Unable to do paracentesis due to small volume ascites      Mild intermittent asthma without complication- (present on admission)  Assessment & Plan  -No sign of exacerbation  -Continue home albuterol as needed    Chronic pain syndrome- (present on admission)  Assessment & Plan  - Continue home oxycodone and gabapentin    Hepatic encephalopathy (HCC)- (present on admission)  Assessment & Plan  Increase lactulose to 45 mL 4 times a day  Titrate to 4 bowel movements per day  Continue rifaximin    GERD (gastroesophageal reflux disease)- (present on admission)  Assessment & Plan  - Continue home  PPI    Normocytic anemia- (present on admission)  Assessment & Plan  -Hemoglobin 7.8, around baseline ranges from 7-10.  Likely in the setting of cirrhosis.  No sign of acute bleed.  -Daily CBC    Asymptomatic bacteriuria- (present on admission)  Assessment & Plan  -Patient denies LUTS.  UA with trace ketones, large occult blood, negative nitrite, moderate leukocyte esterase, 21-50 WBC, 21-50 RBC, many bacteria.  - Although low suspicion for UTI, patient is on IV Zosyn for pseudomonas bacteremia     Dyslipidemia- (present on admission)  Assessment & Plan  DC statin, patient's last LDL was 30      Hypertension- (present on admission)  Assessment & Plan  Started on coreg for portal hypertension       Thrombocytopenia (HCC)- (present on admission)  Assessment & Plan  -PLT 86, around baseline ranging from 40s to 100s.  Likely in the setting of cirrhosis.  No sign of acute bleed.  -Daily CBC    Alcoholic cirrhosis (HCC)- (present on admission)  Assessment & Plan  MELD 15  -CT showing cirrhosis with evidence of portal hypertension, moderate scattered abdominal ascites.  Quit drinking 10 years ago.  - Restart furosemide, lactulose and rifaximin  - start on coreg for portal hypertension           VTE prophylaxis: scd    I have performed a physical exam and reviewed and updated ROS and Plan today (5/12/2025). In review of yesterday's note (5/11/2025), there are no changes except as documented above.      I spent 53 minutes, reviewing the chart, obtaining and/or reviewing separately obtained history. Performing a medically appropriate examination and evaluation.  Counseling and educating the patient. Ordering and reviewing medications, tests, or procedures. Documenting clinical information in EPIC.  Discussing the case with GI.  Independently interpreting results and communicating results to patient. Discussing future disposition of care with patient, RN and case management.

## 2025-05-12 NOTE — DISCHARGE PLANNING
DC Transport Scheduled    Transport Company Scheduled:  [employer]  Spoke with Lavinia at [facility] to schedule transport.    Scheduled Date: 5/12/2025  Scheduled Time: 1230    Transport Type: [place]  Destination:   [facility]   Destination address: [address] [place] [address]    Notified care team of scheduled transport via [employer].     If there are any changes needed to the DC transportation scheduled, please contact [facility] at ext. [phone] between the hours of 0312-0219. If outside those hours, contact the ED Case Manager at ext. [phone].

## 2025-05-12 NOTE — ASSESSMENT & PLAN NOTE
Increase lactulose to 45 mL 4 times a day  Titrate to 4 bowel movements per day  Continue rifaximin

## 2025-05-12 NOTE — PROGRESS NOTES
Received bedside report from DANYEL Mason, pt care assumed. VS stable, Pt A&Ox4, c/o 0/10 pain at this time. Bed locked and in lowest position, bed alarm on.

## 2025-05-12 NOTE — WOUND TEAM
[facility]  Inpatient Services  Initial Wound and Skin Care Evaluation    Admission Date: 5/6/2025     Last order of IP CONSULT TO WOUND CARE was found on 5/11/2025 from Hospital Encounter on 5/6/2025     HPI, PMH, SH: Reviewed    Past Surgical History:   Procedure Laterality Date    PA UPPER GI ENDOSCOPY,DIAGNOSIS N/A 5/11/2025    Procedure: GASTROSCOPY;  Surgeon: Jose Guadalupe Velázquez M.D.;  Location: [facility];  Service: Gastroenterology    COLONOSCOPY N/A 5/11/2025    Procedure: COLONOSCOPY;  Surgeon: Jose Guadalupe Velázquez M.D.;  Location: [facility];  Service: Gastroenterology    PA UPPER GI ENDOSCOPY,DIAGNOSIS N/A 11/27/2024    Procedure: GASTROSCOPY;  Surgeon: Jose Guadalupe Velázquez M.D.;  Location: SURGERY SAME DAY [facility];  Service: Gastroenterology    KNEE AMPUTATION ABOVE Left 1/16/2024    Procedure: LEFT ABOVE KNEE AMPUTATION;  Surgeon: Obdulio Gray M.D.;  Location: [facility];  Service: Orthopedics    PB TOTAL KNEE ARTHROPLASTY Left 10/30/2023    Procedure: LEFT TOTAL KNEE ARTHROPLASTY EXPLANTATION, INSERTION OF ANTIBIOTIC SPACER, AND APPLICATION OF INCISIONAL WOUND VACUUM;  Surgeon: Wild Luz M.D.;  Location: [facility];  Service: Orthopedics    PB REVISE KNEE JOINT REPLACE,ALL PARTS Left 12/28/2022    Procedure: REVISION, TOTAL ARTHROPLASTY, KNEE, ALL COMPONENTS-LEFT;  Surgeon: Wild Luz M.D.;  Location: [facility];  Service: Orthopedics    WOUND IRRIGATION & DEBRIDEMENT Left 12/28/2022    Procedure: IRRIGATION AND DEBRIDEMENT, WOUND;  Surgeon: Wild Luz M.D.;  Location: [facility];  Service: Orthopedics    PB REVISE KNEE JOINT REPLACE,ALL PARTS Left 7/19/2022    Procedure: REVISION, TOTAL ARTHROPLASTY, KNEE, ALL COMPONENTS - ANTIBIOTIC SPACER;  Surgeon: Wild Luz M.D.;  Location: [facility];  Service: Orthopedics    WOUND IRRIGATION & DEBRIDEMENT Left 7/17/2022    Procedure: IRRIGATION AND DEBRIDEMENT,  SHOULDER;  Surgeon: Obdulio Gray M.D.;  Location: SURGERY [facility];  Service: Orthopedics    PB TOTAL KNEE ARTHROPLASTY Left 2020    Procedure: ARTHROPLASTY, KNEE, TOTAL;  Surgeon: Víctor Faustin M.D.;  Location: SURGERY [facility];  Service: Orthopedics    KNEE ARTHROPLASTY TOTAL Right 2018    IRRIGATION & DEBRIDEMENT ORTHO Right 9/3/2017    Procedure: IRRIGATION & DEBRIDEMENT ORTHO, POLY EXCHANGE;  Surgeon: Jerome Russell M.D.;  Location: SURGERY [facility];  Service: Orthopedics    COLONOSCOPY WITH CLIPPING  10/28/2015    Procedure: COLONOSCOPY WITH CLIPPING;  Surgeon: Ruben Colon M.D.;  Location: ENDOSCOPY [facility];  Service:     COLONOSCOPY WITH SCLEROTHERAPY  10/28/2015    Procedure: COLONOSCOPY WITH SCLEROTHERAPY;  Surgeon: Ruben Colon M.D.;  Location: ENDOSCOPY [facility];  Service:     COLONOSCOPY WITH TATTOOING  10/28/2015    Procedure: COLONOSCOPY WITH TATTOOING;  Surgeon: Ruben Colon M.D.;  Location: ENDOSCOPY [facility];  Service:     GYN SURGERY      hysteroscoopy    GYN SURGERY      tubal ligation     Social History     Tobacco Use    Smoking status: Former     Current packs/day: 0.00     Types: Cigarettes     Quit date: 2016     Years since quittin.3    Smokeless tobacco: Former     Quit date: 2021    Tobacco comments:     a few a day when I can   Substance Use Topics    Alcohol use: Not Currently     Comment: hx of AUD     Chief Complaint   Patient presents with    Flu Like Symptoms     Pt BIBA from [facility] for flu like symptoms. Pt has had fever, cough and chills all day. Pt has hx of afib, arrives rate 110-170s. Pt has left AKA. Pt reporting some Cp with palpitations.     Palpitations    Fever     Diagnosis: Sepsis (HCC) [A41.9]    Unit where seen by Wound Team: T7     WOUND CONSULT RELATED TO:  Sacrum    WOUND TEAM PLAN OF CARE - Frequency of Follow-up:   Nursing to follow dressing orders written for wound  care. Contact wound team if area fails to progress, deteriorates or with any questions/concerns if something comes up before next scheduled follow up (See below as to whether wound is following and frequency of wound follow up)     Weekly - Sacrum Stage 2 pressure injury (reoccurance)    WOUND HISTORY:    April Alicia is a 65 y.o. female admitted 5/6/2025 with fevers, chills, cough.      Patient with pressure injury to this area at last admission in January 2025, photos reviewed in the chart.       WOUND ASSESSMENT/LDA  Wound 01/15/25 Pressure Injury Sacrum Stage 2 (Reoccurance) (Active)   Date First Assessed/Time First Assessed: 01/15/25 2330   Present on Original Admission: Yes  Hand Hygiene Completed: Yes  Primary Wound Type: Pressure Injury  Location: Sacrum  Wound Description (Comments): Stage 2 (Reoccurance)      Assessments 5/12/2025 12:00 PM   Wound Image     Site Assessment Pink;Red;Brown;Excoriated   Periwound Assessment Dry;Intact;Non-blanchable erythema;Excoriated   Margins Attached edges;Defined edges   Closure Open to air   Drainage Amount Scant   Drainage Description Serous   Treatments Cleansed;Site care;Offloading   Wound Cleansing Foam Cleanser/Washcloth   Periwound Protectant Barrier Paste   Dressing Status Open to Air   NEXT Weekly Photo (Inpatient Only) 05/14/25   Wound Team Following Weekly   Pressure Injury Stage Stage 2   Non-staged Wound Description Partial thickness   Wound Length (cm) 6 cm   Wound Width (cm) 10 cm   Wound Depth (cm) 0.05 cm   Wound Surface Area (cm^2) 60 cm^2   Wound Volume (cm^3) 3 cm^3   Shape irregular   Wound Odor None   Exposed Structures None   WOUND NURSE ONLY - Time Spent with Patient (mins) 30        Vascular:    JERO:   No results found.    Lab Values:    Lab Results   Component Value Date/Time    WBC 6.3 05/12/2025 01:19 AM    RBC 3.09 (L) 05/12/2025 01:19 AM    HEMOGLOBIN 8.5 (L) 05/12/2025 01:19 AM    HEMATOCRIT 28.0 (L) 05/12/2025 01:19 AM     CREACTPROT 5.69 (H) 10/05/2024 08:23 AM    SEDRATEWES 23 02/12/2024 07:25 AM    HBA1C 5.1 10/15/2024 12:12 PM    PLATELETCT 84 (L) 05/12/2025 01:19 AM         Culture Results show:  No results found for this or any previous visit (from the past 720 hours).    Pain Level/Medicated:  Patient denies pain       INTERVENTIONS BY WOUND TEAM:  Chart and images reviewed. Discussed with bedside RN. All areas of concern (based on picture review, LDA review and discussion with bedside RN) have been thoroughly assessed. Documentation of areas based on significant findings. This RN in to assess patient. Performed standard wound care which includes appropriate positioning, dressing removal and non-selective debridement. Pictures and measurements obtained weekly if/when required.    Wound:  Sacrum  - Stage 2   Preparation for Dressing removal: Open to air  Cleansed/Non-selectively Debrided with:  No rinse foam soap and Moist warm washcloth  Elizabeth wound: Cleansed with No rinse foam soap and Moist warm washcloth, Prepped with Barrier paste  Primary Dressing:  open to air    Advanced Wound Care Discharge Planning  Number of Clinicians necessary to complete wound care: 1  Is patient requiring IV pain medications for dressing changes:  No   Length of time for dressing change 30 min. (This does not include chart review, pre-medication time, set up, clean up or time spent charting.)    Interdisciplinary consultation: Patient, Bedside RN (Kristopher). Pressure injury and staging reviewed with Sonia CASEY (Wound RN).    EVALUATION / RATIONALE FOR TREATMENT:     Date:  05/12/25  Wound Status:  Initial evaluation    The patient has partial thickness tissue loss to her sacrum with excoriation and non-blanching erythema. Patient known from prior admission in January and this is deemed a reoccurance of the pressure injury to that area. Area cleansed and barrier paste applied, left open to air due to incontinence.       Goals: Steady decrease in wound  area and depth weekly.    NURSING PLAN OF CARE ORDERS:  Skin care: See Skin Care orders    NUTRITION RECOMMENDATIONS   Wound Team Recommendations:  Protein supplements  Arginine powder  Vitamin C supplements  Zinc supplements    DIET ORDERS (From admission to next 24h)       Start     Ordered    05/11/25 6868  Diet Order Diet: 2 Gram Sodium  ALL MEALS        Question:  Diet:  Answer:  2 Gram Sodium    05/11/25 1338                    PREVENTATIVE INTERVENTIONS:    Q shift Sincere - performed per nursing policy  Q shift pressure point assessments - performed per nursing policy    Surface/Positioning  Low Airloss - Currently in Place  Reposition q 2 hours with wedges - Currently in Place  Move and Transfer System (MATs) - Currently in Place    Offloading/Redistribution  Heel offloading dressing (Silicone dressing) - Currently in Place  Heel float boots (Prevalon boot) - Ordered for nursing to apply  Float Heels off Bed with Pillows - Currently in Place           Containment/Moisture Prevention    Barrier paste - Currently in Place    Anticipated discharge plans:  TBD        Vac Discharge Needs:  Vac Discharge plan is purely a recommendation from wound team and not a requirement for discharge unless otherwise stated by physician.  Not Applicable Pt not on a wound vac

## 2025-05-12 NOTE — DISCHARGE PLANNING
0916  Agency/Facility Name: [facility]   Spoke To: Rochelle  Outcome: ALDA called to notify pt is medically cleared, Rochelle to verify with DON what time pt can be admitted. Rochelle requested for updated PT and OT recs.     0930  Agency/Facility Name: [facility]  Spoke To: Rochelle  Outcome: [facility] called to request transport for 1230. LISETTE RN Pat notified.     1030  Agency/Facility Name: [facility]   Spoke To: Rochelle   Outcome: Rochelle called to ask if pt is currently on contact isolation? Per Rochelle if pt is on isolation they cannot admit pt today due to not having an isolation bed available. ALDA to verify. LISETTE RN Pat notified.     1032  Agency/Facility Name: [facility]   Spoke To: Rochelle  Outcome: [facility] called back to notify that per MD pt is no longer medically cleared.

## 2025-05-12 NOTE — THERAPY
Physical Therapy   Daily Treatment     Patient Name: April Alicia  Age:  65 y.o., Sex:  female  Medical Record #: [id number]  Today's Date: 5/12/2025     Precautions  Precautions: (P) Fall Risk    Assessment    Pt is hoing to D/C to [facility] today. Pt demo's decreased strength impacting mobility. Recommend 2 people to mobilize for safety as pt fluctuates during the day. Pt is confused and Eek. See below flow sheet for tx details.      Plan    Treatment Plan Status:    Type of Treatment: Bed Mobility, Neuro Re-Education / Balance, Self Care / Home Evaluation, Therapeutic Activities, Therapeutic Exercise  Treatment Frequency: 3 Times per Week  Treatment Duration: Until Therapy Goals Met    DC Equipment Recommendations: Unable to determine at this time  Discharge Recommendations: (P) Recommend post-acute placement for additional physical therapy services prior to discharge home         05/12/25 1006   Time In/Time Out   Therapy Start Time 0946   Therapy End Time 1006   Total Therapy Time 20   Charge Group   Charges  Yes   PT Therapeutic Activities (Units) 1   Total Time Spent   PT Therapeutic Activities Time Spent (Mins) 15    Services   Is patient using  services for this encounter? No   Precautions   Precautions Fall Risk   Vitals   O2 Delivery Device None - Room Air   Cognition    Speech/ Communication Delayed Responses   Level of Consciousness Alert   Safety Awareness Impaired   New Learning Impaired   Comments Eek, pt confused, asking for bed pan when she is already on one   Strength Lower Body   Lower Body Strength  X   Comments RLE weakness impacting standing   Supine Lower Body Exercise   Supine Lower Body Exercises Yes   Heel Slide 2 sets of 10   Ankle Pumps Reciprocal;2 sets of 10   Other Treatments   Other Treatments Provided postioning, bed mob, rolling   Balance   Comments refused to sit required bed pan   Bed Mobility    Rolling Moderate Assist to Lt.;Moderate Assist to Rt.    Gait Analysis   Gait Level Of Assist Unable to Participate   Comments pt is non amb   Functional Mobility   Comments recommedn two people to stand for safety   6 Clicks Assessment - How much HELP from from another person do you currently need... (If the patient hasn't done an activity recently, how much help from another person do you think he/she would need if he/she tried?)   Turning from your back to your side while in a flat bed without using bedrails? 3   Moving from lying on your back to sitting on the side of a flat bed without using bedrails? 3   Moving to and from a bed to a chair (including a wheelchair)? 2   Standing up from a chair using your arms (e.g., wheelchair, or bedside chair)? 2   Walking in hospital room? 1   Climbing 3-5 steps with a railing? 1   6 clicks Mobility Score 12   Short Term Goals    Short Term Goal # 1 in 6 V pt will perform sit to stand with SBA   Goal Outcome # 1 goal not met   Short Term Goal # 2 in 6 V pt will tolerate standing x 2 mins with FWW   Goal Outcome # 2 Goal not met   Short Term Goal # 3 in 6 V pt will transfer bed to chair with CGA   Goal Outcome # 3 Goal not met   Education Group   Role of Physical Therapist Patient Response Patient;Acceptance;Explanation;Verbal Demonstration   Anticipated Discharge Equipment and Recommendations   Discharge Recommendations Recommend post-acute placement for additional physical therapy services prior to discharge home   Interdisciplinary Plan of Care Collaboration   IDT Collaboration with  Nursing   Patient Position at End of Therapy In Bed;Bed Alarm On   Collaboration Comments re; tx   Session Information   Date / Session Number  5/12/25-1 ( 2/3, 5/14)

## 2025-05-13 VITALS
WEIGHT: 228.62 LBS | BODY MASS INDEX: 35.88 KG/M2 | TEMPERATURE: 97.7 F | DIASTOLIC BLOOD PRESSURE: 82 MMHG | HEIGHT: 67 IN | OXYGEN SATURATION: 98 % | HEART RATE: 69 BPM | RESPIRATION RATE: 17 BRPM | SYSTOLIC BLOOD PRESSURE: 142 MMHG

## 2025-05-13 LAB
ALBUMIN SERPL BCP-MCNC: 1.9 G/DL (ref 3.2–4.9)
ALBUMIN/GLOB SERPL: 0.5 G/DL
ALP SERPL-CCNC: 102 U/L (ref 30–99)
ALT SERPL-CCNC: 5 U/L (ref 2–50)
ANION GAP SERPL CALC-SCNC: 7 MMOL/L (ref 7–16)
AST SERPL-CCNC: 14 U/L (ref 12–45)
BILIRUB SERPL-MCNC: 0.6 MG/DL (ref 0.1–1.5)
BUN SERPL-MCNC: 29 MG/DL (ref 8–22)
CALCIUM ALBUM COR SERPL-MCNC: 8.9 MG/DL (ref 8.5–10.5)
CALCIUM SERPL-MCNC: 7.2 MG/DL (ref 8.5–10.5)
CHLORIDE SERPL-SCNC: 111 MMOL/L (ref 96–112)
CO2 SERPL-SCNC: 17 MMOL/L (ref 20–33)
CREAT SERPL-MCNC: 1.17 MG/DL (ref 0.5–1.4)
ERYTHROCYTE [DISTWIDTH] IN BLOOD BY AUTOMATED COUNT: 49.6 FL (ref 35.9–50)
GFR SERPLBLD CREATININE-BSD FMLA CKD-EPI: 52 ML/MIN/1.73 M 2
GLOBULIN SER CALC-MCNC: 3.7 G/DL (ref 1.9–3.5)
GLUCOSE SERPL-MCNC: 124 MG/DL (ref 65–99)
HCT VFR BLD AUTO: 25.5 % (ref 37–47)
HGB BLD-MCNC: 8.2 G/DL (ref 12–16)
MCH RBC QN AUTO: 28.1 PG (ref 27–33)
MCHC RBC AUTO-ENTMCNC: 32.2 G/DL (ref 32.2–35.5)
MCV RBC AUTO: 87.3 FL (ref 81.4–97.8)
PLATELET # BLD AUTO: 56 K/UL (ref 164–446)
PLATELETS.RETICULATED NFR BLD AUTO: 0.9 % (ref 0.6–13.1)
PMV BLD AUTO: 13.2 FL (ref 9–12.9)
POTASSIUM SERPL-SCNC: 4 MMOL/L (ref 3.6–5.5)
PROT SERPL-MCNC: 5.6 G/DL (ref 6–8.2)
RBC # BLD AUTO: 2.92 M/UL (ref 4.2–5.4)
SODIUM SERPL-SCNC: 135 MMOL/L (ref 135–145)
WBC # BLD AUTO: 4 K/UL (ref 4.8–10.8)

## 2025-05-13 PROCEDURE — 85027 COMPLETE CBC AUTOMATED: CPT

## 2025-05-13 PROCEDURE — 700105 HCHG RX REV CODE 258: Performed by: INTERNAL MEDICINE

## 2025-05-13 PROCEDURE — 700102 HCHG RX REV CODE 250 W/ 637 OVERRIDE(OP): Performed by: INTERNAL MEDICINE

## 2025-05-13 PROCEDURE — 99239 HOSP IP/OBS DSCHRG MGMT >30: CPT | Performed by: INTERNAL MEDICINE

## 2025-05-13 PROCEDURE — 85055 RETICULATED PLATELET ASSAY: CPT

## 2025-05-13 PROCEDURE — 36415 COLL VENOUS BLD VENIPUNCTURE: CPT

## 2025-05-13 PROCEDURE — A9270 NON-COVERED ITEM OR SERVICE: HCPCS | Performed by: STUDENT IN AN ORGANIZED HEALTH CARE EDUCATION/TRAINING PROGRAM

## 2025-05-13 PROCEDURE — 80053 COMPREHEN METABOLIC PANEL: CPT

## 2025-05-13 PROCEDURE — A9270 NON-COVERED ITEM OR SERVICE: HCPCS | Performed by: INTERNAL MEDICINE

## 2025-05-13 PROCEDURE — 700102 HCHG RX REV CODE 250 W/ 637 OVERRIDE(OP): Performed by: STUDENT IN AN ORGANIZED HEALTH CARE EDUCATION/TRAINING PROGRAM

## 2025-05-13 PROCEDURE — 700111 HCHG RX REV CODE 636 W/ 250 OVERRIDE (IP): Mod: JZ | Performed by: INTERNAL MEDICINE

## 2025-05-13 RX ORDER — OXYCODONE HYDROCHLORIDE 5 MG/1
5 TABLET ORAL EVERY 6 HOURS PRN
Qty: 8 TABLET | Refills: 0 | Status: SHIPPED | OUTPATIENT
Start: 2025-05-13 | End: 2025-05-15

## 2025-05-13 RX ORDER — CIPROFLOXACIN 500 MG/1
500 TABLET, FILM COATED ORAL 2 TIMES DAILY
Status: ACTIVE | DISCHARGE
Start: 2025-05-13 | End: 2025-05-13

## 2025-05-13 RX ORDER — CARVEDILOL 3.12 MG/1
3.12 TABLET ORAL 2 TIMES DAILY WITH MEALS
Status: SHIPPED
Start: 2025-05-13

## 2025-05-13 RX ORDER — CIPROFLOXACIN 500 MG/1
500 TABLET, FILM COATED ORAL 2 TIMES DAILY
Status: ACTIVE | DISCHARGE
Start: 2025-05-13 | End: 2025-05-17

## 2025-05-13 RX ADMIN — FUROSEMIDE 40 MG: 40 TABLET ORAL at 05:57

## 2025-05-13 RX ADMIN — OXYCODONE 5 MG: 5 TABLET ORAL at 05:57

## 2025-05-13 RX ADMIN — GABAPENTIN 200 MG: 100 CAPSULE ORAL at 05:57

## 2025-05-13 RX ADMIN — PANTOPRAZOLE SODIUM 40 MG: 40 INJECTION, POWDER, FOR SOLUTION INTRAVENOUS at 05:57

## 2025-05-13 RX ADMIN — CARVEDILOL 3.12 MG: 3.12 TABLET, FILM COATED ORAL at 09:34

## 2025-05-13 RX ADMIN — RIFAXIMIN 550 MG: 550 TABLET ORAL at 05:57

## 2025-05-13 RX ADMIN — PIPERACILLIN AND TAZOBACTAM 4.5 G: 4; .5 INJECTION, POWDER, FOR SOLUTION INTRAVENOUS at 06:04

## 2025-05-13 RX ADMIN — NYSTATIN: 100000 POWDER TOPICAL at 06:05

## 2025-05-13 ASSESSMENT — PAIN DESCRIPTION - PAIN TYPE: TYPE: ACUTE PAIN

## 2025-05-13 ASSESSMENT — FIBROSIS 4 INDEX: FIB4 SCORE: 7.27

## 2025-05-13 NOTE — DISCHARGE PLANNING
Case Management Discharge Planning    Admission Date: 5/6/2025  GMLOS: 4.9  ALOS: 6    6-Clicks ADL Score: 14  6-Clicks Mobility Score: 12  PT and/or OT Eval ordered: Yes  Post-acute Referrals Ordered: Yes  Post-acute Choice Obtained: Yes  Has referral(s) been sent to post-acute provider:  Yes      Anticipated Discharge Dispo: Discharge Disposition: D/T to SNF with Medicare cert in anticipation of skilled care (03)    DME Needed: No    Action(s) Taken: Updated Provider/Nurse on Discharge Plan    Escalations Completed: None    Medically Clear: Yes    Next Steps: Discussed in rounds, pt cleared for return to [place]. Transport scheduled for 1300 via [facility]. Pt and daughter aware of plan and agree.    Barriers to Discharge: None    Is the patient up for discharge tomorrow: No

## 2025-05-13 NOTE — PROGRESS NOTES
Attempted to call [facility] a third time for report. No answer.

## 2025-05-13 NOTE — PROGRESS NOTES
Patient is A&Ox4. Discharge instructions. Personal belongings in possession with patient. PIV and tele monitor removed. Copy of discharge instructions in patient chart, signed and reviewed. Patient verbalizes the understanding of the discharge instructions. Questions and concerns addressed prior to leaving the unit.  Transported via gurney by [facility]. Patient discharged to [place].

## 2025-05-13 NOTE — PROGRESS NOTES
Attempted to call [facility] to give report and no answer on both tries.  [phone]

## 2025-05-13 NOTE — DISCHARGE PLANNING
[facility] transport scheduled for today at 1300 going to [facility].   Care team notified.

## 2025-05-13 NOTE — DISCHARGE SUMMARY
Discharge Summary    CHIEF COMPLAINT ON ADMISSION  Chief Complaint   Patient presents with    Flu Like Symptoms     Pt BIBA from [facility] for flu like symptoms. Pt has had fever, cough and chills all day. Pt has hx of afib, arrives rate 110-170s. Pt has left AKA. Pt reporting some Cp with palpitations.     Palpitations    Fever       Reason for Admission  EMS    Admission Date  5/6/2025     CODE STATUS  Full Code    HPI & HOSPITAL COURSE  This is a 65 y.o. female th past medical history of alcoholic cirrhosis, migraine, HTN, paroxysmal atrial fibrillation not on anticoagulation, GERD, chronic pain, NIDDM 2, seizure disorder, asthma, status post left AKA that presents with 1 day history of malaise, dry cough, fever, chills.     Patient states that they have been having a dry cough, full body soreness, fever, chills since yesterday.  Denies any lower urinary tract symptoms.  States that they would always have abdominal pain which is unchanged.     In the ED, BP 90s to 130s/50s to 90s, HR 60s to 150s, RR 11-21, Tmax 100.8 F, saturating well on room air.  Blood cultures x 2 drawn in the ED.  Received LR 1 L bolus x 1, diltiazem 18.6 mg IV x 1.  WBC 6.2, hemoglobin 7.8, PLT 86, bicarb 17, anion gap 9, BUN 45, creatinine 1.35, alk phos 211, lactic acid 1.6, troponin 25>> 19, NT proBNP 894.  COVID/flu/RSV negative.  UA with trace ketones, large occult blood, negative nitrite, moderate leukocyte esterase, 21-50 WBC, 21-50 RBC, many bacteria.  Chest x-ray with trace bilateral pleural effusions.  CT chest abdomen pelvis with contrast showing cirrhosis with recannulization of the umbilical vein and prominent anterior abdominal wall varices compatible with portal hypertension, splenomegaly, moderate scattered abdominal ascites, small bilateral pleural effusions left greater than right, prominent main pulmonary artery suggesting pulmonary arterial hypertension.  EKG atrial fibrillation , QTc 484, with diffuse T wave  flattening, similar to previous.  One of the blood cultures grew pseudomonas she was switched to zosyn for pseudomonas.  Sensitivities came back and will switch to cipro.   She was seen by GI and had EGD done showing Grade 1 esophageal varices, portal hypertensive gastropathy, mild rectal varix x 1. Hemoglobin stable  For her hepatic encephalopathy she has been continued on rifaximin and lactulose. Mentation improving  Patient will be discharged to SNF today     The patient met 2-midnight criteria for an inpatient stay at the time of discharge.      FOLLOW UP ITEMS POST DISCHARGE  GI    DISCHARGE DIAGNOSES  Principal Problem:    Sepsis (HCC) (POA: Yes)  Active Problems:    GI bleed (POA: Yes)    Alcoholic cirrhosis (HCC) (POA: Yes)    Thrombocytopenia (HCC) (POA: Yes)    Hypertension (POA: Yes)    Dyslipidemia (POA: Yes)    Asymptomatic bacteriuria (POA: Yes)    Normocytic anemia (POA: Yes)    GERD (gastroesophageal reflux disease) (POA: Yes)    Hepatic encephalopathy (HCC) (POA: Yes)    Chronic pain syndrome (POA: Yes)    Mild intermittent asthma without complication (POA: Yes)    Bacteremia due to Pseudomonas (POA: Yes)    Normal anion gap metabolic acidosis (POA: Yes)    ELIZABETH (acute kidney injury) (HCC) (POA: Yes)    Atrial fibrillation with RVR (HCC) (POA: Yes)    Seizures (HCC) (POA: Yes)    Hx of AKA (above knee amputation), left (HCC) (POA: Yes)    Elevated troponin (POA: Yes)    DM2 (diabetes mellitus, type 2) (HCC) (POA: Yes)  Resolved Problems:    * No resolved hospital problems. *      FOLLOW UP  No future appointments.  [facility]  [address] [facility] [address]  [phone]        Anum Gatica M.D.  [address] [facility] [id number] [phone]    Schedule an appointment as soon as possible for a visit in 2 week(s)  As needed, If symptoms worsen      MEDICATIONS ON DISCHARGE     Medication List        START taking these medications        Instructions   carvedilol 3.125 MG  Tabs  Commonly known as: Coreg   Take 1 Tablet by mouth 2 times a day with meals.  Dose: 3.125 mg     ciprofloxacin 500 MG Tabs  Commonly known as: Cipro   Take 1 Tablet by mouth 2 times a day for 2 days.  Dose: 500 mg            CHANGE how you take these medications        Instructions   oxyCODONE immediate-release 5 MG Tabs  What changed: additional instructions  Commonly known as: Roxicodone   Take 1 Tablet by mouth every 6 hours as needed for Severe Pain for up to 2 days.  Dose: 5 mg            CONTINUE taking these medications        Instructions   albuterol 108 (90 Base) MCG/ACT Aers inhalation aerosol   Inhale 2 Puffs every four hours as needed for Shortness of Breath.  Dose: 2 Puff     atorvastatin 20 MG Tabs  Commonly known as: Lipitor   Take 1 Tablet by mouth every evening.  Dose: 20 mg     bisacodyl 10 MG Supp  Commonly known as: Dulcolax   Insert 10 mg into the rectum as needed.  Dose: 10 mg     furosemide 40 MG Tabs  Commonly known as: Lasix   Take 1 Tablet by mouth every day.  Dose: 40 mg     gabapentin 100 MG Caps  Commonly known as: Neurontin   Take 2 Capsules by mouth 2 times a day.  Dose: 200 mg     Glucagon Emergency 1 MG Kit   Inject 1 mg as directed one time as needed (hypoglycemia).  Dose: 1 mg     glucose 77.4 % Gel   Take 1 Tube by mouth one time as needed (hypoglycemia). For BS < 60  Dose: 1 Tube     lactulose 20 GM/30ML Soln   Take 30 mL by mouth 4 times a day.  Dose: 30 mL     magnesium hydroxide 400 MG/5ML Susp  Commonly known as: Milk Of Magnesia   Take 30 mL by mouth 1 time a day as needed.  Dose: 30 mL     pantoprazole 20 MG tablet  Commonly known as: Protonix   Take 20 mg by mouth every morning.  Dose: 20 mg     riFAXIMin 550 MG Tabs tablet  Commonly known as: Xifaxan   Take 1 Tablet by mouth 2 times a day.  Dose: 550 mg            STOP taking these medications      metoprolol SR 50 MG Tb24  Commonly known as: Toprol XL     sodium phosphate 7-19 GM/118ML Enem               Allergies  Allergies   Allergen Reactions    Meropenem Swelling     Pt had rxt to meropenem June 2024 (marked tongue swelling and right facial/periorbital edema)    Tuberculin Tests Unspecified     Per MAR from [facility]       DIET  Orders Placed This Encounter   Procedures    Diet Order Diet: 2 Gram Sodium     Standing Status:   Standing     Number of Occurrences:   1     Diet::   2 Gram Sodium [7]       ACTIVITY  As tolerated.  Weight bearing as tolerated    LINES, DRAINS, AND WOUNDS  This is an automated list. Peripheral IVs will be removed prior to discharge.  Peripheral IV 05/09/25 22 G Anterior;Right Forearm (Active)   Site Assessment Clean;Dry;Intact 05/12/25 2000   Dressing Type Transparent 05/12/25 2000   Line Status Saline locked 05/12/25 2000   Dressing Status Clean;Dry;Intact 05/12/25 2000   Dressing Intervention N/A 05/12/25 2000   Dressing Change Due 05/17/25 05/10/25 2130   Infiltration Grading ([facility], YOLANDA) 0 05/12/25 2000   Phlebitis Scale ([facility] Only) 0 05/12/25 2000       Peripheral IV 05/09/25 20 G Anterior;Right Upper arm (Active)   Site Assessment Clean;Dry;Intact;Ecchymotic 05/12/25 2000   Dressing Type Transparent 05/12/25 2000   Line Status Saline locked 05/12/25 2000   Dressing Status Clean;Dry;Intact 05/12/25 2000   Dressing Intervention N/A 05/12/25 2000   Dressing Change Due 05/17/25 05/10/25 0730   Date Primary Tubing Changed 05/09/25 05/09/25 1715   Infiltration Grading ([facility], [facility]) 0 05/12/25 2000   Phlebitis Scale ([facility] Only) 0 05/12/25 2000       Wound 01/15/25 Pressure Injury Sacrum Stage 2 (Reoccurance) (Active)   Wound Image   05/12/25 1200   Site Assessment Red;Dark edges;Purple 05/12/25 2000   Periwound Assessment Dry;Intact 05/12/25 2000   Margins Attached edges;Defined edges 05/12/25 2000   Closure Open to air 05/12/25 2000   Drainage Amount None 05/12/25 2000   Drainage Description Serous 05/12/25 1200   Treatments Cleansed;Offloading 05/12/25 2000   Wound  Cleansing Foam Cleanser/Washcloth 05/12/25 2000   Periwound Protectant Barrier Paste 05/12/25 2000   Dressing Status Open to Air 05/12/25 2000   NEXT Weekly Photo (Inpatient Only) 05/14/25 05/12/25 1200   Wound Team Following Weekly 05/12/25 1200   WOUND NURSE ONLY - Pressure Injury Stage 2 05/12/25 1200   Non-staged Wound Description Partial thickness 05/12/25 1200   Wound Length (cm) 6 cm 05/12/25 1200   Wound Width (cm) 10 cm 05/12/25 1200   Wound Depth (cm) 0.05 cm 05/12/25 1200   Wound Surface Area (cm^2) 60 cm^2 05/12/25 1200   Wound Volume (cm^3) 3 cm^3 05/12/25 1200   Shape irregular 05/12/25 1200   Wound Odor None 05/12/25 1200   Exposed Structures None 05/12/25 1200   WOUND NURSE ONLY - Time Spent with Patient (mins) 30 05/12/25 1200       Peripheral IV 05/09/25 22 G Anterior;Right Forearm (Active)   Site Assessment Clean;Dry;Intact 05/12/25 2000   Dressing Type Transparent 05/12/25 2000   Line Status Saline locked 05/12/25 2000   Dressing Status Clean;Dry;Intact 05/12/25 2000   Dressing Intervention N/A 05/12/25 2000   Dressing Change Due 05/17/25 05/10/25 2130   Infiltration Grading ([facility], [facility]) 0 05/12/25 2000   Phlebitis Scale ([facility] Only) 0 05/12/25 2000       Peripheral IV 05/09/25 20 G Anterior;Right Upper arm (Active)   Site Assessment Clean;Dry;Intact;Ecchymotic 05/12/25 2000   Dressing Type Transparent 05/12/25 2000   Line Status Saline locked 05/12/25 2000   Dressing Status Clean;Dry;Intact 05/12/25 2000   Dressing Intervention N/A 05/12/25 2000   Dressing Change Due 05/17/25 05/10/25 0730   Date Primary Tubing Changed 05/09/25 05/09/25 1715   Infiltration Grading ([facility], [facility]) 0 05/12/25 2000   Phlebitis Scale ([facility] Only) 0 05/12/25 2000               MENTAL STATUS ON TRANSFER             CONSULTATIONS  GI    PROCEDURES  EGD    LABORATORY  Lab Results   Component Value Date    SODIUM 135 05/13/2025    POTASSIUM 4.0 05/13/2025    CHLORIDE 111 05/13/2025    CO2 17 (L) 05/13/2025    GLUCOSE  124 (H) 05/13/2025    BUN 29 (H) 05/13/2025    CREATININE 1.17 05/13/2025    CREATININE 0.9 03/12/2009        Lab Results   Component Value Date    WBC 4.0 (L) 05/13/2025    HEMOGLOBIN 8.2 (L) 05/13/2025    HEMATOCRIT 25.5 (L) 05/13/2025    PLATELETCT 56 (L) 05/13/2025        Total time of the discharge process exceeds 35 minutes.

## 2025-05-13 NOTE — CARE PLAN
The patient is Stable - Low risk of patient condition declining or worsening    Shift Goals  Clinical Goals: Q2 turns, abx  Patient Goals: cmfort, stay warm  Family Goals: not present    Progress made toward(s) clinical / shift goals:    Problem: Knowledge Deficit - Standard  Goal: Patient and family/care givers will demonstrate understanding of plan of care, disease process/condition, diagnostic tests and medications  Outcome: Progressing     Problem: Hemodynamics  Goal: Patient's hemodynamics, fluid balance and neurologic status will be stable or improve  Outcome: Progressing     Problem: Fluid Volume  Goal: Fluid volume balance will be maintained  Outcome: Progressing     Problem: Urinary - Renal Perfusion  Goal: Ability to achieve and maintain adequate renal perfusion and functioning will improve  Outcome: Progressing     Problem: Respiratory  Goal: Patient will achieve/maintain optimum respiratory ventilation and gas exchange  Outcome: Progressing     Problem: Fall Risk  Goal: Patient will remain free from falls  Outcome: Progressing     Problem: Pain - Standard  Goal: Alleviation of pain or a reduction in pain to the patient’s comfort goal  Outcome: Progressing

## 2025-05-13 NOTE — PROGRESS NOTES
Received bedside report from DANYEL Summers, pt care assumed. VS stable, Pt A&Ox4, c/o 0/10 pain at this time. Bed locked and in lowest position, bed alarm on.

## 2025-05-24 NOTE — DISCHARGE PLANNING
DC Transport Scheduled    Received request at: 1/8/2024 at 1557    Transport Company Scheduled:  [facility]  Spoke with Dougie at [facility] to schedule transport.  [facility] Trip #: [id number]    Scheduled Date: 1/9/2024  Scheduled Time: 0365-4034    [facility] [address] [facility]     Notified care team of scheduled transport via Vo[employer]te.     If there are any changes needed to the DC transportation scheduled, please contact [facility] at ext. [phone] between the hours of [phone] Mon-Fri. If outside those hours, contact the ED Case Manager at ext. [phone].      5 lateral steps to head of bed. pt refused further ambulation secondary to feeling wet/5 feet

## 2025-06-18 ENCOUNTER — APPOINTMENT (OUTPATIENT)
Dept: RADIOLOGY | Facility: MEDICAL CENTER | Age: 65
DRG: 689 | End: 2025-06-18
Attending: EMERGENCY MEDICINE
Payer: MEDICARE

## 2025-06-18 ENCOUNTER — HOSPITAL ENCOUNTER (INPATIENT)
Facility: MEDICAL CENTER | Age: 65
LOS: 3 days | DRG: 689 | End: 2025-06-21
Attending: EMERGENCY MEDICINE | Admitting: STUDENT IN AN ORGANIZED HEALTH CARE EDUCATION/TRAINING PROGRAM
Payer: MEDICARE

## 2025-06-18 DIAGNOSIS — N30.00 ACUTE CYSTITIS WITHOUT HEMATURIA: ICD-10-CM

## 2025-06-18 DIAGNOSIS — J90 PLEURAL EFFUSION: Primary | ICD-10-CM

## 2025-06-18 DIAGNOSIS — N39.0 ACUTE UTI: ICD-10-CM

## 2025-06-18 DIAGNOSIS — R10.30 LOWER ABDOMINAL PAIN: ICD-10-CM

## 2025-06-18 DIAGNOSIS — J18.9 PNEUMONIA DUE TO INFECTIOUS ORGANISM, UNSPECIFIED LATERALITY, UNSPECIFIED PART OF LUNG: ICD-10-CM

## 2025-06-18 LAB
ALBUMIN SERPL BCP-MCNC: 2.2 G/DL (ref 3.2–4.9)
ALBUMIN/GLOB SERPL: 0.6 G/DL
ALP SERPL-CCNC: 114 U/L (ref 30–99)
ALT SERPL-CCNC: 6 U/L (ref 2–50)
ANION GAP SERPL CALC-SCNC: 8 MMOL/L (ref 7–16)
APPEARANCE UR: ABNORMAL
AST SERPL-CCNC: 18 U/L (ref 12–45)
BACTERIA #/AREA URNS HPF: ABNORMAL /HPF
BASOPHILS # BLD AUTO: 0.4 % (ref 0–1.8)
BASOPHILS # BLD: 0.06 K/UL (ref 0–0.12)
BILIRUB SERPL-MCNC: 0.9 MG/DL (ref 0.1–1.5)
BILIRUB UR QL STRIP.AUTO: ABNORMAL
BUN SERPL-MCNC: 30 MG/DL (ref 8–22)
CALCIUM ALBUM COR SERPL-MCNC: 8.9 MG/DL (ref 8.5–10.5)
CALCIUM SERPL-MCNC: 7.5 MG/DL (ref 8.5–10.5)
CASTS URNS QL MICRO: ABNORMAL /LPF (ref 0–2)
CHLORIDE SERPL-SCNC: 106 MMOL/L (ref 96–112)
CO2 SERPL-SCNC: 20 MMOL/L (ref 20–33)
COLOR UR: ABNORMAL
CREAT SERPL-MCNC: 1.5 MG/DL (ref 0.5–1.4)
EKG IMPRESSION: NORMAL
EOSINOPHIL # BLD AUTO: 0.12 K/UL (ref 0–0.51)
EOSINOPHIL NFR BLD: 0.8 % (ref 0–6.9)
EPITHELIAL CELLS 1715: ABNORMAL /HPF (ref 0–5)
EPITHELIAL CELLS RENAL  1715R: PRESENT /HPF
ERYTHROCYTE [DISTWIDTH] IN BLOOD BY AUTOMATED COUNT: 52.5 FL (ref 35.9–50)
GFR SERPLBLD CREATININE-BSD FMLA CKD-EPI: 38 ML/MIN/1.73 M 2
GLOBULIN SER CALC-MCNC: 3.7 G/DL (ref 1.9–3.5)
GLUCOSE SERPL-MCNC: 127 MG/DL (ref 65–99)
GLUCOSE UR STRIP.AUTO-MCNC: NEGATIVE MG/DL
HCT VFR BLD AUTO: 28.2 % (ref 37–47)
HGB BLD-MCNC: 8.8 G/DL (ref 12–16)
HYALINE CAST   1831: PRESENT /LPF
IMM GRANULOCYTES # BLD AUTO: 0.08 K/UL (ref 0–0.11)
IMM GRANULOCYTES NFR BLD AUTO: 0.6 % (ref 0–0.9)
KETONES UR STRIP.AUTO-MCNC: 15 MG/DL
LACTATE SERPL-SCNC: 1.9 MMOL/L (ref 0.5–2)
LEUKOCYTE ESTERASE UR QL STRIP.AUTO: ABNORMAL
LIPASE SERPL-CCNC: 28 U/L (ref 11–82)
LYMPHOCYTES # BLD AUTO: 0.63 K/UL (ref 1–4.8)
LYMPHOCYTES NFR BLD: 4.4 % (ref 22–41)
MCH RBC QN AUTO: 26.8 PG (ref 27–33)
MCHC RBC AUTO-ENTMCNC: 31.2 G/DL (ref 32.2–35.5)
MCV RBC AUTO: 86 FL (ref 81.4–97.8)
MICRO URNS: ABNORMAL
MONOCYTES # BLD AUTO: 0.38 K/UL (ref 0–0.85)
MONOCYTES NFR BLD AUTO: 2.7 % (ref 0–13.4)
NEUTROPHILS # BLD AUTO: 12.9 K/UL (ref 1.82–7.42)
NEUTROPHILS NFR BLD: 91.1 % (ref 44–72)
NITRITE UR QL STRIP.AUTO: NEGATIVE
NRBC # BLD AUTO: 0 K/UL
NRBC BLD-RTO: 0 /100 WBC (ref 0–0.2)
NT-PROBNP SERPL IA-MCNC: 1665 PG/ML (ref 0–125)
PH UR STRIP.AUTO: 5 [PH] (ref 5–8)
PLATELET # BLD AUTO: 66 K/UL (ref 164–446)
PLATELETS.RETICULATED NFR BLD AUTO: 1.1 % (ref 0.6–13.1)
POTASSIUM SERPL-SCNC: 4.7 MMOL/L (ref 3.6–5.5)
PROT SERPL-MCNC: 5.9 G/DL (ref 6–8.2)
PROT UR QL STRIP: 100 MG/DL
RBC # BLD AUTO: 3.28 M/UL (ref 4.2–5.4)
RBC # URNS HPF: ABNORMAL /HPF (ref 0–2)
RBC UR QL AUTO: ABNORMAL
SODIUM SERPL-SCNC: 134 MMOL/L (ref 135–145)
SP GR UR STRIP.AUTO: 1.02
TROPONIN T SERPL-MCNC: 27 NG/L (ref 6–19)
UROBILINOGEN UR STRIP.AUTO-MCNC: 1 EU/DL
WBC # BLD AUTO: 14.2 K/UL (ref 4.8–10.8)
WBC #/AREA URNS HPF: ABNORMAL /HPF

## 2025-06-18 PROCEDURE — 96365 THER/PROPH/DIAG IV INF INIT: CPT

## 2025-06-18 PROCEDURE — 74177 CT ABD & PELVIS W/CONTRAST: CPT

## 2025-06-18 PROCEDURE — 85055 RETICULATED PLATELET ASSAY: CPT

## 2025-06-18 PROCEDURE — 83880 ASSAY OF NATRIURETIC PEPTIDE: CPT

## 2025-06-18 PROCEDURE — 93005 ELECTROCARDIOGRAM TRACING: CPT | Mod: TC | Performed by: EMERGENCY MEDICINE

## 2025-06-18 PROCEDURE — 700117 HCHG RX CONTRAST REV CODE 255: Mod: UD | Performed by: EMERGENCY MEDICINE

## 2025-06-18 PROCEDURE — A9270 NON-COVERED ITEM OR SERVICE: HCPCS | Performed by: STUDENT IN AN ORGANIZED HEALTH CARE EDUCATION/TRAINING PROGRAM

## 2025-06-18 PROCEDURE — 700105 HCHG RX REV CODE 258: Performed by: NURSE PRACTITIONER

## 2025-06-18 PROCEDURE — 700105 HCHG RX REV CODE 258: Performed by: STUDENT IN AN ORGANIZED HEALTH CARE EDUCATION/TRAINING PROGRAM

## 2025-06-18 PROCEDURE — 700111 HCHG RX REV CODE 636 W/ 250 OVERRIDE (IP): Performed by: STUDENT IN AN ORGANIZED HEALTH CARE EDUCATION/TRAINING PROGRAM

## 2025-06-18 PROCEDURE — 83605 ASSAY OF LACTIC ACID: CPT

## 2025-06-18 PROCEDURE — A9270 NON-COVERED ITEM OR SERVICE: HCPCS | Performed by: NURSE PRACTITIONER

## 2025-06-18 PROCEDURE — 71045 X-RAY EXAM CHEST 1 VIEW: CPT

## 2025-06-18 PROCEDURE — 83690 ASSAY OF LIPASE: CPT

## 2025-06-18 PROCEDURE — 700102 HCHG RX REV CODE 250 W/ 637 OVERRIDE(OP): Performed by: NURSE PRACTITIONER

## 2025-06-18 PROCEDURE — 770006 HCHG ROOM/CARE - MED/SURG/GYN SEMI*

## 2025-06-18 PROCEDURE — 700111 HCHG RX REV CODE 636 W/ 250 OVERRIDE (IP): Mod: JZ,UD,TB | Performed by: EMERGENCY MEDICINE

## 2025-06-18 PROCEDURE — 99285 EMERGENCY DEPT VISIT HI MDM: CPT

## 2025-06-18 PROCEDURE — 700102 HCHG RX REV CODE 250 W/ 637 OVERRIDE(OP): Mod: UD | Performed by: EMERGENCY MEDICINE

## 2025-06-18 PROCEDURE — 700102 HCHG RX REV CODE 250 W/ 637 OVERRIDE(OP): Performed by: STUDENT IN AN ORGANIZED HEALTH CARE EDUCATION/TRAINING PROGRAM

## 2025-06-18 PROCEDURE — 99223 1ST HOSP IP/OBS HIGH 75: CPT | Mod: AI | Performed by: STUDENT IN AN ORGANIZED HEALTH CARE EDUCATION/TRAINING PROGRAM

## 2025-06-18 PROCEDURE — 84484 ASSAY OF TROPONIN QUANT: CPT

## 2025-06-18 PROCEDURE — 85025 COMPLETE CBC W/AUTO DIFF WBC: CPT

## 2025-06-18 PROCEDURE — 87186 SC STD MICRODIL/AGAR DIL: CPT

## 2025-06-18 PROCEDURE — 36415 COLL VENOUS BLD VENIPUNCTURE: CPT

## 2025-06-18 PROCEDURE — 700105 HCHG RX REV CODE 258: Mod: UD | Performed by: EMERGENCY MEDICINE

## 2025-06-18 PROCEDURE — 87086 URINE CULTURE/COLONY COUNT: CPT

## 2025-06-18 PROCEDURE — 80053 COMPREHEN METABOLIC PANEL: CPT

## 2025-06-18 PROCEDURE — 81001 URINALYSIS AUTO W/SCOPE: CPT

## 2025-06-18 PROCEDURE — 87077 CULTURE AEROBIC IDENTIFY: CPT | Mod: 91

## 2025-06-18 PROCEDURE — 76705 ECHO EXAM OF ABDOMEN: CPT

## 2025-06-18 PROCEDURE — 87040 BLOOD CULTURE FOR BACTERIA: CPT

## 2025-06-18 PROCEDURE — A9270 NON-COVERED ITEM OR SERVICE: HCPCS | Mod: UD | Performed by: EMERGENCY MEDICINE

## 2025-06-18 RX ORDER — ATORVASTATIN CALCIUM 20 MG/1
20 TABLET, FILM COATED ORAL NIGHTLY
Status: DISCONTINUED | OUTPATIENT
Start: 2025-06-18 | End: 2025-06-21 | Stop reason: HOSPADM

## 2025-06-18 RX ORDER — GABAPENTIN 100 MG/1
200 CAPSULE ORAL 2 TIMES DAILY
Status: DISCONTINUED | OUTPATIENT
Start: 2025-06-18 | End: 2025-06-21 | Stop reason: HOSPADM

## 2025-06-18 RX ORDER — CARVEDILOL 3.12 MG/1
3.12 TABLET ORAL 2 TIMES DAILY WITH MEALS
Status: DISCONTINUED | OUTPATIENT
Start: 2025-06-18 | End: 2025-06-21 | Stop reason: HOSPADM

## 2025-06-18 RX ORDER — OMEPRAZOLE 20 MG/1
20 CAPSULE, DELAYED RELEASE ORAL EVERY MORNING
Status: DISCONTINUED | OUTPATIENT
Start: 2025-06-19 | End: 2025-06-21 | Stop reason: HOSPADM

## 2025-06-18 RX ORDER — SODIUM CHLORIDE 9 MG/ML
INJECTION, SOLUTION INTRAVENOUS CONTINUOUS
Status: ACTIVE | OUTPATIENT
Start: 2025-06-18 | End: 2025-06-19

## 2025-06-18 RX ORDER — SPIRONOLACTONE 25 MG/1
50 TABLET ORAL EVERY MORNING
Status: DISCONTINUED | OUTPATIENT
Start: 2025-06-18 | End: 2025-06-21 | Stop reason: HOSPADM

## 2025-06-18 RX ORDER — SPIRONOLACTONE 50 MG/1
50 TABLET, FILM COATED ORAL EVERY MORNING
COMMUNITY

## 2025-06-18 RX ORDER — DOXYCYCLINE 100 MG/1
100 TABLET ORAL ONCE
Status: COMPLETED | OUTPATIENT
Start: 2025-06-18 | End: 2025-06-18

## 2025-06-18 RX ORDER — ENOXAPARIN SODIUM 100 MG/ML
40 INJECTION SUBCUTANEOUS DAILY
Status: DISCONTINUED | OUTPATIENT
Start: 2025-06-18 | End: 2025-06-21 | Stop reason: HOSPADM

## 2025-06-18 RX ORDER — LACTULOSE 10 G/15ML
30 SOLUTION ORAL 4 TIMES DAILY
Status: DISCONTINUED | OUTPATIENT
Start: 2025-06-18 | End: 2025-06-21 | Stop reason: HOSPADM

## 2025-06-18 RX ORDER — IBUPROFEN 600 MG/1
600 TABLET, FILM COATED ORAL ONCE
Status: COMPLETED | OUTPATIENT
Start: 2025-06-18 | End: 2025-06-18

## 2025-06-18 RX ORDER — OXYCODONE HYDROCHLORIDE 5 MG/1
5 TABLET ORAL EVERY 6 HOURS PRN
Status: ON HOLD | COMMUNITY
End: 2025-06-21

## 2025-06-18 RX ORDER — OXYCODONE HYDROCHLORIDE 5 MG/1
5 TABLET ORAL ONCE
Refills: 0 | Status: COMPLETED | OUTPATIENT
Start: 2025-06-18 | End: 2025-06-18

## 2025-06-18 RX ORDER — FUROSEMIDE 40 MG/1
40 TABLET ORAL DAILY
Status: DISCONTINUED | OUTPATIENT
Start: 2025-06-18 | End: 2025-06-21 | Stop reason: HOSPADM

## 2025-06-18 RX ADMIN — ATORVASTATIN CALCIUM 20 MG: 20 TABLET, FILM COATED ORAL at 21:32

## 2025-06-18 RX ADMIN — IBUPROFEN 600 MG: 600 TABLET ORAL at 21:33

## 2025-06-18 RX ADMIN — SODIUM CHLORIDE: 9 INJECTION, SOLUTION INTRAVENOUS at 21:33

## 2025-06-18 RX ADMIN — CEFAZOLIN 2 G: 2 INJECTION, POWDER, FOR SOLUTION INTRAVENOUS at 14:44

## 2025-06-18 RX ADMIN — GABAPENTIN 200 MG: 100 CAPSULE ORAL at 17:59

## 2025-06-18 RX ADMIN — AMPICILLIN AND SULBACTAM 3 G: 1; 2 INJECTION, POWDER, FOR SOLUTION INTRAMUSCULAR; INTRAVENOUS at 18:35

## 2025-06-18 RX ADMIN — RIFAXIMIN 550 MG: 550 TABLET ORAL at 17:59

## 2025-06-18 RX ADMIN — AMPICILLIN AND SULBACTAM 3 G: 1; 2 INJECTION, POWDER, FOR SOLUTION INTRAMUSCULAR; INTRAVENOUS at 23:53

## 2025-06-18 RX ADMIN — OXYCODONE 5 MG: 5 TABLET ORAL at 14:45

## 2025-06-18 RX ADMIN — ENOXAPARIN SODIUM 40 MG: 100 INJECTION SUBCUTANEOUS at 17:58

## 2025-06-18 RX ADMIN — DOXYCYCLINE 100 MG: 100 INJECTION, POWDER, LYOPHILIZED, FOR SOLUTION INTRAVENOUS at 19:44

## 2025-06-18 RX ADMIN — IOHEXOL 96 ML: 350 INJECTION, SOLUTION INTRAVENOUS at 15:00

## 2025-06-18 RX ADMIN — DOXYCYCLINE 100 MG: 100 TABLET, FILM COATED ORAL at 15:37

## 2025-06-18 RX ADMIN — LACTULOSE 30 ML: 10 SOLUTION ORAL at 18:36

## 2025-06-18 RX ADMIN — LACTULOSE 30 ML: 10 SOLUTION ORAL at 21:33

## 2025-06-18 ASSESSMENT — LIFESTYLE VARIABLES
EVER HAD A DRINK FIRST THING IN THE MORNING TO STEADY YOUR NERVES TO GET RID OF A HANGOVER: NO
ON A TYPICAL DAY WHEN YOU DRINK ALCOHOL HOW MANY DRINKS DO YOU HAVE: 0
TOTAL SCORE: 0
AVERAGE NUMBER OF DAYS PER WEEK YOU HAVE A DRINK CONTAINING ALCOHOL: 0
TOTAL SCORE: 0
EVER FELT BAD OR GUILTY ABOUT YOUR DRINKING: NO
TOTAL SCORE: 0
ALCOHOL_USE: NO
HAVE PEOPLE ANNOYED YOU BY CRITICIZING YOUR DRINKING: NO
CONSUMPTION TOTAL: NEGATIVE
HOW MANY TIMES IN THE PAST YEAR HAVE YOU HAD 5 OR MORE DRINKS IN A DAY: 0
HAVE YOU EVER FELT YOU SHOULD CUT DOWN ON YOUR DRINKING: NO

## 2025-06-18 ASSESSMENT — HEART SCORE
HEART SCORE: 5
TROPONIN: 1-3 TIMES NORMAL LIMIT
AGE: 65+
HISTORY: SLIGHTLY SUSPICIOUS
RISK FACTORS: 1-2 RISK FACTORS
ECG: NON-SPECIFIC REPOLARIZATION DISTURBANCE

## 2025-06-18 ASSESSMENT — FIBROSIS 4 INDEX
FIB4 SCORE: 7.24
FIB4 SCORE: 7.24
FIB4 SCORE: 7.27

## 2025-06-18 ASSESSMENT — SOCIAL DETERMINANTS OF HEALTH (SDOH)
WITHIN THE LAST YEAR, HAVE TO BEEN RAPED OR FORCED TO HAVE ANY KIND OF SEXUAL ACTIVITY BY YOUR PARTNER OR EX-PARTNER?: NO
WITHIN THE PAST 12 MONTHS, THE FOOD YOU BOUGHT JUST DIDN'T LAST AND YOU DIDN'T HAVE MONEY TO GET MORE: NEVER TRUE
IN THE PAST 12 MONTHS, HAS THE ELECTRIC, GAS, OIL, OR WATER COMPANY THREATENED TO SHUT OFF SERVICE IN YOUR HOME?: NO
WITHIN THE LAST YEAR, HAVE YOU BEEN HUMILIATED OR EMOTIONALLY ABUSED IN OTHER WAYS BY YOUR PARTNER OR EX-PARTNER?: NO
WITHIN THE LAST YEAR, HAVE YOU BEEN AFRAID OF YOUR PARTNER OR EX-PARTNER?: NO
WITHIN THE LAST YEAR, HAVE YOU BEEN KICKED, HIT, SLAPPED, OR OTHERWISE PHYSICALLY HURT BY YOUR PARTNER OR EX-PARTNER?: NO
WITHIN THE PAST 12 MONTHS, YOU WORRIED THAT YOUR FOOD WOULD RUN OUT BEFORE YOU GOT THE MONEY TO BUY MORE: NEVER TRUE

## 2025-06-18 ASSESSMENT — ENCOUNTER SYMPTOMS
FEVER: 0
VOMITING: 0
CHILLS: 0
NAUSEA: 0
ABDOMINAL PAIN: 1
HEADACHES: 0

## 2025-06-18 ASSESSMENT — PAIN DESCRIPTION - PAIN TYPE
TYPE: ACUTE PAIN

## 2025-06-18 ASSESSMENT — COGNITIVE AND FUNCTIONAL STATUS - GENERAL
WALKING IN HOSPITAL ROOM: A LOT
MOVING FROM LYING ON BACK TO SITTING ON SIDE OF FLAT BED: A LOT
MOVING TO AND FROM BED TO CHAIR: A LOT
DRESSING REGULAR LOWER BODY CLOTHING: A LOT
SUGGESTED CMS G CODE MODIFIER DAILY ACTIVITY: CK
DAILY ACTIVITIY SCORE: 17
MOBILITY SCORE: 13
TURNING FROM BACK TO SIDE WHILE IN FLAT BAD: A LITTLE
STANDING UP FROM CHAIR USING ARMS: A LOT
HELP NEEDED FOR BATHING: A LOT
DRESSING REGULAR UPPER BODY CLOTHING: A LITTLE
CLIMB 3 TO 5 STEPS WITH RAILING: A LOT
SUGGESTED CMS G CODE MODIFIER MOBILITY: CL
TOILETING: A LOT

## 2025-06-18 ASSESSMENT — PATIENT HEALTH QUESTIONNAIRE - PHQ9
SUM OF ALL RESPONSES TO PHQ9 QUESTIONS 1 AND 2: 0
SUM OF ALL RESPONSES TO PHQ9 QUESTIONS 1 AND 2: 0
2. FEELING DOWN, DEPRESSED, IRRITABLE, OR HOPELESS: NOT AT ALL
1. LITTLE INTEREST OR PLEASURE IN DOING THINGS: NOT AT ALL
2. FEELING DOWN, DEPRESSED, IRRITABLE, OR HOPELESS: NOT AT ALL
1. LITTLE INTEREST OR PLEASURE IN DOING THINGS: NOT AT ALL

## 2025-06-18 NOTE — ED TRIAGE NOTES
Chief Complaint   Patient presents with    Painful Urination     Pt BIBA from [facility]. Pt complaint of painful urination x3 days.       Pt at [facility] for rehab d/t recent BKA. A&O x4. Pt received 250 cc bolus of NS en route. 2L NC at baseline.

## 2025-06-18 NOTE — ED NOTES
Pharmacy Medication Reconciliation    ~Med rec updated and complete per MAR- [facility]   ~Allergies have been verified and updated per MAR    ~Anticoagulants (rivaroxaban, apixaban, edoxaban, dabigatran, warfarin, enoxaparin) taken in the last 14 days? No

## 2025-06-18 NOTE — ED PROVIDER NOTES
ER Provider Note    Scribed for Vandana Major M.d. by Ness Francis. 6/18/2025  11:30 AM    Primary Care Provider: Anum Gatica M.D.    CHIEF COMPLAINT   Chief Complaint   Patient presents with    Painful Urination     Pt BIBA from [place]. Pt complaint of painful urination x3 days.      EXTERNAL RECORDS REVIEWED  Inpatient Notes  patient was admitted here at [facility] May 6, 2025 through May 13, 2025 for sepsis.  She has history of alcoholic cirrhosis, hypertension, paroxysmal A-fib not on anticoagulation, GERD, chronic pain, type 2 diabetes, seizure disorder, asthma, left AKA.  She presented to the ER on May 6 complaining of malaise, dry cough, fevers and chills.  Chest x-ray showed bilateral trace pleural effusions.  She had A-fib with RVR with a heart rate of 132.  One of the blood cultures grew out Pseudomonas and she was switched to Zosyn.  She was seen by GI and had an EGD done which showed grade 1 esophageal varices, portal hypertensive gastropathy, mild rectal varices x 1.  Hemo-globin was stable.  For her hepatic encephalopathy she was continued on rifaximin and lactulose.  Mentation was improving.  She was discharged back to SNF.    HPI/ROS  LIMITATION TO HISTORY   Select: : None  OUTSIDE HISTORIAN(S):  None    April Alicia is a 65 y.o. female with a past medical history of alcoholic cirrhosis, migraines, hypertension, paroxysmal atrial fibrillation, GERD, asthma, who presents to the ED complaining of lower abdominal pain onset one month ago.Her abdominal pain occurs daily, and radiates to her back. The abdominal pain has been associated with dysuria for the past two days, and she reports seeing blood in her urine also. She also noted blood from her anus a few days ago, but is unsure if any blood was present in her stool today or yesterday. The patient denies any recent vomiting. She notes she had a fever with a temperature of 101°F last night.  Additionally, she also reports a  history of chest pain, which began one month ago. The chest pain occurs about once a week, lasts for a few minutes, and then resolves, with no symptoms in between episodes. The pain is described as tight. The last episode of chest pain occurred one week ago. The patient denies any chest pain currently at bedside. The patient has been residing at [facility] for the past 10 months. She denies any history of abdominal surgeries.  Patient does not have any chest pain at this time.  Last time she had chest pain was 1 week ago.  She complains of diffuse abdominal pain as well as chronic pain in her left AKA leg.  Patient is on 2 L of oxygen via nasal cannula 24/7.  No increase in oxygen requirement recently.  No shortness of breath.    PAST MEDICAL HISTORY  Past Medical History[1]    SURGICAL HISTORY  Past Surgical History[2]    FAMILY HISTORY  Family History   Problem Relation Age of Onset    Diabetes Mother     Seizures Father     Heart Attack Father 45    Diabetes Brother     Alcohol abuse Brother     Dementia Brother     Diabetes Brother        SOCIAL HISTORY   reports that she quit smoking about 8 years ago. Her smoking use included cigarettes. She quit smokeless tobacco use about 4 years ago. She reports that she does not currently use alcohol. She reports that she does not currently use drugs.    CURRENT MEDICATIONS  Current Discharge Medication List        CONTINUE these medications which have NOT CHANGED    Details   spironolactone (ALDACTONE) 50 MG Tab Take 50 mg by mouth every morning.      ciprofloxacin (CIPRO HC OTIC) 0.2-1 % Suspension Administer 3 Drops into the right ear 2 times a day. PRESCRIPTION COURSE WAS COMPLETED      oxyCODONE immediate-release (ROXICODONE) 5 MG Tab Take 5 mg by mouth every 6 hours as needed for Severe Pain.      carvedilol (COREG) 3.125 MG Tab Take 1 Tablet by mouth 2 times a day with meals.    Associated Diagnoses: Paroxysmal atrial fibrillation (HCC)      gabapentin  "(NEURONTIN) 100 MG Cap Take 2 Capsules by mouth 2 times a day.    Associated Diagnoses: Degeneration of intervertebral disc of lumbar region, unspecified whether pain present      lactulose 20 GM/30ML Solution Take 30 mL by mouth 4 times a day.    Associated Diagnoses: Acute hepatic encephalopathy (HCC)      riFAXIMin (XIFAXAN) 550 MG Tab tablet Take 1 Tablet by mouth 2 times a day.    Associated Diagnoses: Acute hepatic encephalopathy (HCC)      furosemide (LASIX) 40 MG Tab Take 1 Tablet by mouth every day.    Associated Diagnoses: Bilateral leg edema      pantoprazole (PROTONIX) 20 MG tablet Take 20 mg by mouth every morning.      atorvastatin (LIPITOR) 20 MG Tab Take 1 Tablet by mouth every evening.  Qty: 90 Tablet, Refills: 3    Associated Diagnoses: Controlled type 2 diabetes mellitus with hyperglycemia, without long-term current use of insulin (HCC)      Glucagon, rDNA, (GLUCAGON EMERGENCY) 1 MG Kit Inject 1 mg as directed one time as needed (hypoglycemia).      magnesium hydroxide (MILK OF MAGNESIA) 400 MG/5ML Suspension Take 30 mL by mouth 1 time a day as needed.      bisacodyl (DULCOLAX) 10 MG Suppos Insert 10 mg into the rectum as needed.      Dextrose, Diabetic Use, (GLUCOSE) 77.4 % Gel Take 1 Tube by mouth one time as needed (hypoglycemia). For BS < 60      albuterol 108 (90 Base) MCG/ACT Aero Soln inhalation aerosol Inhale 2 Puffs every four hours as needed for Shortness of Breath.             ALLERGIES  Meropenem and Tuberculin tests    PHYSICAL EXAM  /65   Pulse 75   Temp 37.2 °C (99 °F) (Oral)   Resp 16   Ht 1.702 m (5' 7\")   Wt 104 kg (229 lb)   LMP 06/02/2008   SpO2 99%   BMI 35.87 kg/m²   Constitutional: Well developed, well nourished; No acute distress; Non-toxic appearance.   HENT: Normocephalic, atraumatic; Bilateral external ears normal; Dry mucus membranes; .   Eyes: PERRL, EOMI, Conjunctiva normal. No discharge.   Neck:  Supple, nontender midline; No stridor; No nuchal " rigidity.   Lymphatic: No cervical lymphadenopathy noted.   Cardiovascular: Regular rate and rhythm without murmurs, rubs, or gallop.   Thorax & Lungs: No respiratory distress, breath sounds clear to auscultation bilaterally without wheezing, rales or rhonchi. Nontender chest wall. No crepitus or subcutaneous air  Abdomen: Diffuse tenderness to percussion and palpation, maximally in lower abdomen. Soft, bowel sounds normal. No obvious masses; No pulsatile masses; no rebound, guarding, or peritoneal signs.   Skin: Sallow and jaundice in color.  Back: Nontender, No CVA tenderness.    Extremities: BKA on the left- stump looks good. Scar from previous skin graft over right leg, warm and pink. Unable to palpate pulse.  Patient has 2+ dopplerable DP and PT pulses with slightly monophasic sound.; No edema; Nontender calves or saphenous, No cyanosis, No clubbing.   Musculoskeletal: Good range of motion in all major joints. No tenderness to palpation or major deformities noted.   Neurologic: Alert & oriented x 4, clear speech,    DIAGNOSTIC STUDIES    EKG/LABS  Results for orders placed or performed during the hospital encounter of 06/18/25   CBC WITH DIFFERENTIAL    Collection Time: 06/18/25 11:32 AM   Result Value Ref Range    WBC 14.2 (H) 4.8 - 10.8 K/uL    RBC 3.28 (L) 4.20 - 5.40 M/uL    Hemoglobin 8.8 (L) 12.0 - 16.0 g/dL    Hematocrit 28.2 (L) 37.0 - 47.0 %    MCV 86.0 81.4 - 97.8 fL    MCH 26.8 (L) 27.0 - 33.0 pg    MCHC 31.2 (L) 32.2 - 35.5 g/dL    RDW 52.5 (H) 35.9 - 50.0 fL    Platelet Count 66 (L) 164 - 446 K/uL    Neutrophils-Polys 91.10 (H) 44.00 - 72.00 %    Lymphocytes 4.40 (L) 22.00 - 41.00 %    Monocytes 2.70 0.00 - 13.40 %    Eosinophils 0.80 0.00 - 6.90 %    Basophils 0.40 0.00 - 1.80 %    Immature Granulocytes 0.60 0.00 - 0.90 %    Nucleated RBC 0.00 0.00 - 0.20 /100 WBC    Neutrophils (Absolute) 12.90 (H) 1.82 - 7.42 K/uL    Lymphs (Absolute) 0.63 (L) 1.00 - 4.80 K/uL    Monos (Absolute) 0.38 0.00 -  0.85 K/uL    Eos (Absolute) 0.12 0.00 - 0.51 K/uL    Baso (Absolute) 0.06 0.00 - 0.12 K/uL    Immature Granulocytes (abs) 0.08 0.00 - 0.11 K/uL    NRBC (Absolute) 0.00 K/uL   COMP METABOLIC PANEL    Collection Time: 06/18/25 11:32 AM   Result Value Ref Range    Sodium 134 (L) 135 - 145 mmol/L    Potassium 4.7 3.6 - 5.5 mmol/L    Chloride 106 96 - 112 mmol/L    Co2 20 20 - 33 mmol/L    Anion Gap 8.0 7.0 - 16.0    Glucose 127 (H) 65 - 99 mg/dL    Bun 30 (H) 8 - 22 mg/dL    Creatinine 1.50 (H) 0.50 - 1.40 mg/dL    Calcium 7.5 (L) 8.5 - 10.5 mg/dL    Correct Calcium 8.9 8.5 - 10.5 mg/dL    AST(SGOT) 18 12 - 45 U/L    ALT(SGPT) 6 2 - 50 U/L    Alkaline Phosphatase 114 (H) 30 - 99 U/L    Total Bilirubin 0.9 0.1 - 1.5 mg/dL    Albumin 2.2 (L) 3.2 - 4.9 g/dL    Total Protein 5.9 (L) 6.0 - 8.2 g/dL    Globulin 3.7 (H) 1.9 - 3.5 g/dL    A-G Ratio 0.6 g/dL   LIPASE    Collection Time: 06/18/25 11:32 AM   Result Value Ref Range    Lipase 28 11 - 82 U/L   Lactic Acid    Collection Time: 06/18/25 11:32 AM   Result Value Ref Range    Lactic Acid 1.9 0.5 - 2.0 mmol/L   IMMATURE PLT FRACTION    Collection Time: 06/18/25 11:32 AM   Result Value Ref Range    Imm. Plt Fraction 1.1 0.6 - 13.1 %   ESTIMATED GFR    Collection Time: 06/18/25 11:32 AM   Result Value Ref Range    GFR (CKD-EPI) 38 (A) >60 mL/min/1.73 m 2   proBrain Natriuretic Peptide, NT    Collection Time: 06/18/25 11:32 AM   Result Value Ref Range    NT-proBNP 1665 (H) 0 - 125 pg/mL   Blood Culture - Draw one from central line and one from peripheral site    Collection Time: 06/18/25 11:57 AM    Specimen: Line; Blood   Result Value Ref Range    Significant Indicator NEG     Source BLD     Site Peripheral     Culture Result       No Growth  Note: Blood cultures are incubated for 5 days and  are monitored continuously.Positive blood cultures  are called to the RN and reported as soon as  they are identified.     Blood Culture - Draw one from central line and one from  peripheral site    Collection Time: 25 12:20 PM    Specimen: Peripheral; Blood   Result Value Ref Range    Significant Indicator NEG     Source BLD     Site PERIPHERAL     Culture Result       No Growth  Note: Blood cultures are incubated for 5 days and  are monitored continuously.Positive blood cultures  are called to the RN and reported as soon as  they are identified.     URINALYSIS (UA)    Collection Time: 25 12:43 PM    Specimen: Urine   Result Value Ref Range    Color Dark Yellow     Character Turbid (A)     Specific Gravity 1.019 <1.035    Ph 5.0 5.0 - 8.0    Glucose Negative Negative mg/dL    Ketones 15 (A) Negative mg/dL    Protein 100 (A) Negative mg/dL    Bilirubin Small (A) Negative    Urobilinogen, Urine 1.0 <=1.0 EU/dL    Nitrite Negative Negative    Leukocyte Esterase Moderate (A) Negative    Occult Blood Large (A) Negative    Micro Urine Req Microscopic    URINE MICROSCOPIC (W/UA)    Collection Time: 25 12:43 PM   Result Value Ref Range    WBC 11-20 (A) /hpf    RBC 11-20 (A) 0 - 2 /hpf    Bacteria Many (A) None /hpf    Epithelial Cells 11-20 (A) 0 - 5 /hpf    Epithelial Cells Renal Present (A) Absent /hpf    Urine Casts 0-2 0 - 2 /lpf    Hyaline Cast Present /lpf   TROPONIN    Collection Time: 25  4:11 PM   Result Value Ref Range    Troponin T 27 (H) 6 - 19 ng/L   EKG (NOW)    Collection Time: 25  8:34 PM   Result Value Ref Range    Report       [facility] Emergency Dept.    Test Date:  2025  Pt Name:    FRANCISCA TURNER          Department: ER  MRN:        [id number]                      Room:       [facility]  Gender:     Female                       Technician: [id number]  :        1960                   Requested By:AYAAN MAJOR  Order #:    [id number]                    Reading MD: Ayaan Major    Measurements  Intervals                                Axis  Rate:       81                           P:          0  NY:         0                             QRS:        13  QRSD:       78                           T:          45  QT:         491  QTc:        570    Interpretive Statements  Atrial fibrillation rate 81  Normal axis  T waves flat throughout  No ST elevation or depression  Low voltage, extremity and precordial leads  Prolonged QT interval  Compared to ECG 05/06/2025 21:06:09  Prolonged QT interval now present  Ventricular premature complex(es) no longer present  T-wave abnormality no longer pre sent  Electronically Signed On 06- 20:34:10 PDT by Vandana Major       *Note: Due to a large number of results and/or encounters for the requested time period, some results have not been displayed. A complete set of results can be found in Results Review.     I have independently interpreted this EKG    RADIOLOGY/PROCEDURES   The attending emergency physician has independently interpreted the diagnostic imaging associated with this visit and am waiting the final reading from the radiologist.     My preliminary interpretation is a follows: ER MD is reviewed the patient's chest x-ray.  There appears to be a right-sided pleural effusion with some cardiomegaly.    Radiologist interpretation:  US-RUQ   Final Result      1.  Gallbladder sludge without discrete cholelithiasis.   2.  Gallbladder wall thickening, nonspecific. Consider HIDA scan if there is concern for acute cholecystitis.   3.  Features suspicious for cirrhosis.   4.  Small right pleural effusion.      CT-ABDOMEN-PELVIS WITH   Final Result      1.  New right pleural effusion with collapse and consolidation of the right lower lobe.   2.  Cirrhosis with portal hypertension.   3.  Mild ascites, decreased from the prior exam.   4.  Atherosclerosis including coronary artery disease.   5.  Gallbladder wall thickening/edema versus pericholecystic fluid.   6.  Wall thickening of the urinary bladder may be seen with cystitis. Urinary bladder is not well distended, however.         DX-CHEST-PORTABLE (1 VIEW)    Final Result      New right pleural effusion with associated atelectasis versus infiltrate.      NM-BILIARY (HIDA) SCAN WITH CCK    (Results Pending)       COURSE & MEDICAL DECISION MAKING     ASSESSMENT, COURSE AND PLAN  Care Narrative: Patient presents to the ER with complaint of abdominal pain which has been going on for the last 1 month.  Nursing home reports that she has had pain with urination for the last 2 to 3 days.  She also complains of chest pain which occurs once a week.  She noticed that about a month ago.  The last time she had chest pain was 1 week ago.  No chest pain today.  She is tender diffusely throughout the abdomen with percussion and palpation.  Urinalysis is positive for infection.  CT scan of the abdomen pelvis was performed and it was noted the patient has a new right pleural effusion with collapse and consolidation of the right lower lobe.  This is new from a month ago when she was admitted to the hospital.  She also was found to have mild ascites and some gallbladder wall thickening/edema versus.  Cholecystic fluid.  She also had wall thickening of the urinary bladder consistent with a cystitis.  Patient was given Ancef for her urinary tract infection.  She has no CVA tenderness and at this time I do not think she has pyelonephritis.  She reported a fever yesterday at nursing home, but this was not corroborated by nursing home staff notes.  She is afebrile here in the ER.  Blood pressure is stable.  She is not tachycardic.  She does not appear to be septic or toxic.  She was given Ancef for her urinary tract infection.  I added doxycycline for the possibility of a hidden pneumonia in the area of that right pleural effusion.  Gallbladder ultrasound was performed and reveals some gallbladder wall thickening which is nonspecific.  There is some sludge, but no obvious stones.  Patient will need to be hospitalized for this new large pleural effusion on the right.  At this time she does not  need emergent thoracentesis as she is not hypoxic and is on her normal 2 L of oxygen at baseline.  She is not in any respiratory distress.  Thoracentesis via interventional radiology may be appropriate as an inpatient.  She also need treatment for urinary tract infection/cystitis and further workup for what may be a cholecystitis although I think this is low possibility at this time as she is really not tender in the right upper quadrant.  Further evaluation with HIDA scan may be necessary.  I spoke with Dr. Tillman, hospitalist on-call, and he will kindly evaluate the patient hospitalization.      CHEST PAIN:   HEART Score for Major Cardiac Events  HEART Score     History: Slightly suspicious  ECG: Non-specific repolarization disturbance  Age: 65+  Risk Factors: 1-2 risk factors  Troponin: 1-3 times normal limit    Heart Score: 5    Total Score   0-3 Points = Low Score, risk of MACE 0.9-1.7%.  4-6 Points = Moderate Score, risk of MACE 12-16.6%  7-10 Points = High Score, risk of MACE 50-65%      11:35 AM - Patient seen and examined at bedside. Discussed plan of care, including labs. Patient agrees to the plan of care.     3:39 PM- Dr. Tillman (Hospitalist) will admit the patient for care.     ADDITIONAL PROBLEM LIST  Problem #1: Chest pain once a week x 1 month with the last episode being 1 week ago  Problem #2: Abdominal pain x 1 month   problem #3: Pain with urination x 2 to 3 days      DISPOSITION AND DISCUSSIONS  I have discussed management of the patient with the following physicians and CANDIDO's:  Dr. Tillman (Hospitalist)    Discussion of management with other [facility] or appropriate source(s): Gloria.  Pharmacy.  She says I could add doxycycline to the Ancef which would cover for possible pneumonia due to what looks like some consolidation on that right lung from the pleural effusion.    Escalation of care considered, and ultimately not performed: Patient is not septic or toxic.  She has a new right-sided pleural  effusion and history of CHF.  At this time I do not think she needs aggressive IV fluid management.    Barriers to care at this time, including but not limited to: None known.     Decision tools and prescription drugs considered including, but not limited to: Antibiotics Ancef and doxycycline..    DISPOSITION:  Patient will be hospitalized by Dr. Tillman in guarded condition.    FINAL DIANGOSIS  1. Pleural effusion Acute   2. Acute UTI Acute   3. Lower abdominal pain Acute     This dictation has been created using voice recognition software. The accuracy of the dictation is limited by the abilities of the software. I expect there may be some errors of grammar and possibly content. I made every attempt to manually correct the errors within my dictation. However, errors related to voice recognition software may still exist and should be interpreted within the appropriate context.     Ness ARGUETA (Scribe), am scribing for, and in the presence of, Vandana Major M.D..    Electronically signed by: Ness Francis (Scribe), 6/18/2025    IVandana M.D. personally performed the services described in this documentation, as scribed by Ness Francis in my presence, and it is both accurate and complete.     The note accurately reflects work and decisions made by me.  Vandana Major M.D.  6/18/2025  8:37 PM         [1]   Past Medical History:  Diagnosis Date    Abnormal finding of lung 12/27/2022    Acute exacerbation of chronic obstructive pulmonary disease (COPD) (HCC) 01/27/2020    Alcoholic cirrhosis (HCC)     Arthritis     OA in knees    ASTHMA     Atrial fibrillation (HCC)     Dry eyes 11/16/2017    Edentulous     upper and lower dentures    Emphysema of lung (HCC)     H/O: hysterectomy 12/05/2019    Heart burn     left knee    Hepatic encephalopathy (HCC) 12/27/2022    Hepatitis C infection 07/28/2022    History of rheumatic fever 09/04/2017    Hypertension     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HCC)      Recurrent, L knee, x4 with surgical washout    Medication overuse headache 08/15/2017    Mitral regurgitation     Pancytopenia ([facility]) 07/26/2022    Pneumonia 02/2020    Primary osteoarthritis of left knee 08/25/2020    Prosthetic joint infection ([facility]) 2018    Right knee after total knee    Protein-calorie malnutrition, severe ([facility]) 08/01/2022    Right leg DVT ([facility]) 2018    acute, resolved    Seizure disorder ([facility])     Septic arthritis of knee, left ([facility]) 07/19/2022    Septic arthritis of shoulder, left ([facility]) 07/17/2022    Septic shock due to Pseudomonas species ([facility]) 10/30/2023    Type 2 diabetes mellitus ([facility]) 12/27/2022   [2]   Past Surgical History:  Procedure Laterality Date    [place] UPPER GI ENDOSCOPY,DIAGNOSIS N/A 5/11/2025    Procedure: GASTROSCOPY;  Surgeon: Jose Guadalupe Velázquez M.D.;  Location: [facility];  Service: Gastroenterology    COLONOSCOPY N/A 5/11/2025    Procedure: COLONOSCOPY;  Surgeon: Jose Guadalupe Velázquez M.D.;  Location: [facility];  Service: Gastroenterology    [place] UPPER GI ENDOSCOPY,DIAGNOSIS N/A 11/27/2024    Procedure: GASTROSCOPY;  Surgeon: Jose Guadalupe Velázquez M.D.;  Location: SURGERY SAME DAY [facility];  Service: Gastroenterology    KNEE AMPUTATION ABOVE Left 1/16/2024    Procedure: LEFT ABOVE KNEE AMPUTATION;  Surgeon: Obdulio Gray M.D.;  Location: [facility];  Service: Orthopedics    PB TOTAL KNEE ARTHROPLASTY Left 10/30/2023    Procedure: LEFT TOTAL KNEE ARTHROPLASTY EXPLANTATION, INSERTION OF ANTIBIOTIC SPACER, AND APPLICATION OF INCISIONAL WOUND VACUUM;  Surgeon: Wild Luz M.D.;  Location: [facility];  Service: Orthopedics    PB REVISE KNEE JOINT REPLACE,ALL PARTS Left 12/28/2022    Procedure: REVISION, TOTAL ARTHROPLASTY, KNEE, ALL COMPONENTS-LEFT;  Surgeon: Wild Luz M.D.;  Location: [facility];  Service: Orthopedics    WOUND IRRIGATION & DEBRIDEMENT Left 12/28/2022    Procedure: IRRIGATION AND DEBRIDEMENT, WOUND;  Surgeon: Wild VALERIO  DENNIS Luz;  Location: SURGERY [facility];  Service: Orthopedics    PB REVISE KNEE JOINT REPLACE,ALL PARTS Left 7/19/2022    Procedure: REVISION, TOTAL ARTHROPLASTY, KNEE, ALL COMPONENTS - ANTIBIOTIC SPACER;  Surgeon: Wild Luz M.D.;  Location: SURGERY [facility];  Service: Orthopedics    WOUND IRRIGATION & DEBRIDEMENT Left 7/17/2022    Procedure: IRRIGATION AND DEBRIDEMENT, SHOULDER;  Surgeon: Obdulio Gray M.D.;  Location: SURGERY [facility];  Service: Orthopedics    PB TOTAL KNEE ARTHROPLASTY Left 8/24/2020    Procedure: ARTHROPLASTY, KNEE, TOTAL;  Surgeon: Víctor Faustin M.D.;  Location: [facility];  Service: Orthopedics    KNEE ARTHROPLASTY TOTAL Right 2018    IRRIGATION & DEBRIDEMENT ORTHO Right 9/3/2017    Procedure: IRRIGATION & DEBRIDEMENT ORTHO, POLY EXCHANGE;  Surgeon: Jerome Russell M.D.;  Location: [facility];  Service: Orthopedics    COLONOSCOPY WITH CLIPPING  10/28/2015    Procedure: COLONOSCOPY WITH CLIPPING;  Surgeon: Ruben Colon M.D.;  Location: [facility];  Service:     COLONOSCOPY WITH SCLEROTHERAPY  10/28/2015    Procedure: COLONOSCOPY WITH SCLEROTHERAPY;  Surgeon: Ruben Colon M.D.;  Location: [facility];  Service:     COLONOSCOPY WITH TATTOOING  10/28/2015    Procedure: COLONOSCOPY WITH TATTOOING;  Surgeon: Ruben Colon M.D.;  Location: [facility];  Service:     GYN SURGERY  2003    hysteroscoopy    GYN SURGERY  1982    tubal ligation

## 2025-06-18 NOTE — ASSESSMENT & PLAN NOTE
Has been seen previously on imaging and redemonstrated now  GB wall thickening vs pericholecystic fluid seen on CT A/P   HIDA done - pending read  Negative Irving sign and abdominal exam benign other than generalized mild tenderness which may be attributable to UTI

## 2025-06-18 NOTE — H&P
Hospital Medicine History & Physical Note    Date of Service  6/18/2025    Primary Care Physician  Anum Gatica M.D.    Code Status  Full Code    Chief Complaint  Chief Complaint   Patient presents with    Painful Urination     Pt BIBA from [facility]. Pt complaint of painful urination x3 days.        History of Presenting Illness  April Alicia is a 65 y.o. female who presented 6/18/2025 with dysuria.  Patient has a history of alcoholic cirrhosis, migraines, hypertension, paroxysmal A-fib not currently on anticoagulation with history of GI bleed.  She resides in a care facility.  She presents due to lower abdominal pain and dysuria.  Her history is inconsistent.  Initially was reported as developing over the last couple days, at times she says it has been months.  She says it stings and burns when she pees and has generalized abdominal pain.  She has multiple other nonspecific complaints.  Urinalysis was consistent with UTI.  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  She had an elevated white count at 14.2.    I discussed the plan of care with patient.    Review of Systems  Review of Systems   Constitutional:  Negative for chills and fever.   Gastrointestinal:  Positive for abdominal pain. Negative for nausea and vomiting.   Genitourinary:  Positive for dysuria and frequency.   Neurological:  Negative for headaches.       Past Medical History   has a past medical history of Abnormal finding of lung (12/27/2022), Acute exacerbation of chronic obstructive pulmonary disease (COPD) ([facility]) (01/27/2020), Alcoholic cirrhosis (HCC), Arthritis, ASTHMA, Atrial fibrillation (HCC), Dry eyes (11/16/2017), Edentulous, Emphysema of lung ([facility]), H/O: hysterectomy (12/05/2019), Heart burn, Hepatic encephalopathy (HCC) (12/27/2022), Hepatitis C infection (07/28/2022), History of rheumatic fever (09/04/2017), Hypertension, 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HCC), Medication overuse headache (08/15/2017), Mitral  regurgitation, Pancytopenia ([facility]) (07/26/2022), Pneumonia (02/2020), Primary osteoarthritis of left knee (08/25/2020), Prosthetic joint infection ([facility]) (2018), Protein-calorie malnutrition, severe ([facility]) (08/01/2022), Right leg DVT ([facility]) (2018), Seizure disorder ([facility]), Septic arthritis of knee, left ([facility]) (07/19/2022), Septic arthritis of shoulder, left ([facility]) (07/17/2022), Septic shock due to Pseudomonas species ([facility]) (10/30/2023), and Type 2 diabetes mellitus ([facility]) (12/27/2022).    Surgical History   has a past surgical history that includes gyn surgery (1982); gyn surgery (2003); colonoscopy with clipping (10/28/2015); colonoscopy with sclerotherapy (10/28/2015); colonoscopy with tattooing (10/28/2015); irrigation & debridement ortho (Right, 9/3/2017); knee arthroplasty total (Right, 2018); pr total knee arthroplasty (Left, 8/24/2020); wound irrigation & debridement (Left, 7/17/2022); pr revise knee joint replace,all parts (Left, 7/19/2022); pr revise knee joint replace,all parts (Left, 12/28/2022); wound irrigation & debridement (Left, 12/28/2022); pr total knee arthroplasty (Left, 10/30/2023); knee amputation above (Left, 1/16/2024); pr upper gi endoscopy,diagnosis (N/A, 11/27/2024); pr upper gi endoscopy,diagnosis (N/A, 5/11/2025); and colonoscopy (N/A, 5/11/2025).     Family History  Family History   Problem Relation Age of Onset    Diabetes Mother     Seizures Father     Heart Attack Father 45    Diabetes Brother     Alcohol abuse Brother     Dementia Brother     Diabetes Brother         Family history reviewed with patient. There is no family history that is pertinent to the chief complaint.     Social History   reports that she quit smoking about 8 years ago. Her smoking use included cigarettes. She quit smokeless tobacco use about 4 years ago. She reports that she does not currently use alcohol. She reports that she does not currently use drugs.    Allergies  Allergies[1]    Medications  Prior to Admission  Medications   Prescriptions Last Dose Informant Patient Reported? Taking?   Dextrose, Diabetic Use, (GLUCOSE) 77.4 % Gel Unknown MAR from Other Facility Yes No   Sig: Take 1 Tube by mouth one time as needed (hypoglycemia). For BS < 60   Glucagon, rDNA, (GLUCAGON EMERGENCY) 1 MG Kit Unknown MAR from Other Facility Yes No   Sig: Inject 1 mg as directed one time as needed (hypoglycemia).   albuterol 108 (90 Base) MCG/ACT Aero Soln inhalation aerosol Unknown MAR from Other Facility Yes No   Sig: Inhale 2 Puffs every four hours as needed for Shortness of Breath.   atorvastatin (LIPITOR) 20 MG Tab 6/17/2025 Bedtime MAR from Other Facility No Yes   Sig: Take 1 Tablet by mouth every evening.   bisacodyl (DULCOLAX) 10 MG Suppos Unknown MAR from Other Facility Yes No   Sig: Insert 10 mg into the rectum as needed.   carvedilol (COREG) 3.125 MG Tab 6/17/2025 Evening MAR from Other Facility No Yes   Sig: Take 1 Tablet by mouth 2 times a day with meals.   ciprofloxacin (CIPRO HC OTIC) 0.2-1 % Suspension 6/8/2025 at  8:00 PM MAR from Other Facility Yes Yes   Sig: Administer 3 Drops into the right ear 2 times a day. PRESCRIPTION COURSE WAS COMPLETED   furosemide (LASIX) 40 MG Tab 6/17/2025 Morning MAR from Other Facility No Yes   Sig: Take 1 Tablet by mouth every day.   gabapentin (NEURONTIN) 100 MG Cap 6/18/2025 at  8:00 AM MAR from Other Facility No Yes   Sig: Take 2 Capsules by mouth 2 times a day.   lactulose 20 GM/30ML Solution 6/18/2025 at  8:00 AM MAR from Other Facility No Yes   Sig: Take 30 mL by mouth 4 times a day.   magnesium hydroxide (MILK OF MAGNESIA) 400 MG/5ML Suspension Unknown MAR from Other Facility Yes No   Sig: Take 30 mL by mouth 1 time a day as needed.   oxyCODONE immediate-release (ROXICODONE) 5 MG Tab 6/17/2025 at  9:00 PM MAR from Other Facility Yes Yes   Sig: Take 5 mg by mouth every 6 hours as needed for Severe Pain.   pantoprazole (PROTONIX) 20 MG tablet 6/18/2025 Morning MAR from Other Facility Yes  "Yes   Sig: Take 20 mg by mouth every morning.   riFAXIMin (XIFAXAN) 550 MG Tab tablet 6/18/2025 Morning MAR from Other Facility No Yes   Sig: Take 1 Tablet by mouth 2 times a day.   spironolactone (ALDACTONE) 50 MG Tab 6/17/2025 Morning MAR from Other Facility Yes Yes   Sig: Take 50 mg by mouth every morning.      Facility-Administered Medications: None       Physical Exam  Temp:  [37.2 °C (99 °F)] 37.2 °C (99 °F)  Pulse:  [] 83  Resp:  [15-20] 20  BP: ()/(55-69) 117/67  SpO2:  [94 %-99 %] 95 %  Blood Pressure : 117/67   Temperature: 37.2 °C (99 °F)   Pulse: 83   Respiration: 20   Pulse Oximetry: 95 %       Physical Exam  Constitutional:       General: She is not in acute distress.  HENT:      Head: Normocephalic and atraumatic.      Nose: Nose normal.      Mouth/Throat:      Mouth: Mucous membranes are moist.      Pharynx: Oropharynx is clear.   Eyes:      Extraocular Movements: Extraocular movements intact.   Cardiovascular:      Rate and Rhythm: Normal rate. Rhythm irregular.      Pulses: Normal pulses.      Heart sounds: Normal heart sounds.   Pulmonary:      Breath sounds: Normal breath sounds.   Skin:     General: Skin is warm and dry.   Neurological:      General: No focal deficit present.         Laboratory:  Recent Labs     06/18/25  1132   WBC 14.2*   RBC 3.28*   HEMOGLOBIN 8.8*   HEMATOCRIT 28.2*   MCV 86.0   MCH 26.8*   MCHC 31.2*   RDW 52.5*   PLATELETCT 66*     Recent Labs     06/18/25  1132   SODIUM 134*   POTASSIUM 4.7   CHLORIDE 106   CO2 20   GLUCOSE 127*   BUN 30*   CREATININE 1.50*   CALCIUM 7.5*     Recent Labs     06/18/25  1132   ALTSGPT 6   ASTSGOT 18   ALKPHOSPHAT 114*   TBILIRUBIN 0.9   LIPASE 28   GLUCOSE 127*         Recent Labs     06/18/25  1132   NTPROBNP 1665*         No results for input(s): \"TROPONINT\" in the last 72 hours.    Imaging:  CT-ABDOMEN-PELVIS WITH   Final Result      1.  New right pleural effusion with collapse and consolidation of the right lower lobe.   2.  " Cirrhosis with portal hypertension.   3.  Mild ascites, decreased from the prior exam.   4.  Atherosclerosis including coronary artery disease.   5.  Gallbladder wall thickening/edema versus pericholecystic fluid.   6.  Wall thickening of the urinary bladder may be seen with cystitis. Urinary bladder is not well distended, however.         DX-CHEST-PORTABLE (1 VIEW)   Final Result      New right pleural effusion with associated atelectasis versus infiltrate.      US-RUQ    (Results Pending)   NM-BILIARY (HIDA) SCAN WITH CCK    (Results Pending)       X-Ray:  I have personally reviewed the images and compared with prior images.  EKG:  I have personally reviewed the images and compared with prior images.    Assessment/Plan:  Justification for Admission Status  I anticipate this patient will require at least two midnights for appropriate medical management, necessitating inpatient admission because UTI    Patient will need a Telemetry bed on EMERGENCY service .  The need is secondary to above.    * UTI (urinary tract infection)- (present on admission)  Assessment & Plan  Previously urine cultures have grown multidrug-resistant E. coli however is sensitive to Unasyn  I have started Unasyn  Also has pulmonary infiltrate and will include doxycycline as well  Follow cultures      Thickening of wall of gallbladder- (present on admission)  Assessment & Plan  Has been seen previously on imaging and redemonstrated now  Pending abdominal ultrasound  I have ordered HIDA scan to further examine, may need surgical consultation  Negative Irving sign and abdominal exam benign other than generalized mild tenderness which may be attributable to UTI    Alcoholic cirrhosis (HCC)- (present on admission)  Assessment & Plan  Continuing Lasix and spironolactone as well as rifaximin and lactulose        VTE prophylaxis: SCDs/TEDs and enoxaparin ppx       [1]   Allergies  Allergen Reactions    Meropenem Swelling     Pt had rxt to meropenem  June 2024 (marked tongue swelling and right facial/periorbital edema)    Tuberculin Tests Unspecified     Per MAR from [facility]

## 2025-06-18 NOTE — ASSESSMENT & PLAN NOTE
Previously urine cultures have grown multidrug-resistant E. coli however is sensitive to Unasyn  Also has pulmonary infiltrate  Switch to Ceftriaxone pending further work up, follow cultures

## 2025-06-19 ENCOUNTER — APPOINTMENT (OUTPATIENT)
Dept: RADIOLOGY | Facility: MEDICAL CENTER | Age: 65
DRG: 689 | End: 2025-06-19
Attending: STUDENT IN AN ORGANIZED HEALTH CARE EDUCATION/TRAINING PROGRAM
Payer: MEDICARE

## 2025-06-19 LAB
ANION GAP SERPL CALC-SCNC: 11 MMOL/L (ref 7–16)
BUN SERPL-MCNC: 33 MG/DL (ref 8–22)
CALCIUM SERPL-MCNC: 7.2 MG/DL (ref 8.5–10.5)
CHLORIDE SERPL-SCNC: 106 MMOL/L (ref 96–112)
CO2 SERPL-SCNC: 18 MMOL/L (ref 20–33)
CREAT SERPL-MCNC: 1.31 MG/DL (ref 0.5–1.4)
ERYTHROCYTE [DISTWIDTH] IN BLOOD BY AUTOMATED COUNT: 52.3 FL (ref 35.9–50)
GFR SERPLBLD CREATININE-BSD FMLA CKD-EPI: 45 ML/MIN/1.73 M 2
GLUCOSE SERPL-MCNC: 91 MG/DL (ref 65–99)
HCT VFR BLD AUTO: 29.1 % (ref 37–47)
HGB BLD-MCNC: 9.2 G/DL (ref 12–16)
MAGNESIUM SERPL-MCNC: 1.1 MG/DL (ref 1.5–2.5)
MCH RBC QN AUTO: 26.9 PG (ref 27–33)
MCHC RBC AUTO-ENTMCNC: 31.6 G/DL (ref 32.2–35.5)
MCV RBC AUTO: 85.1 FL (ref 81.4–97.8)
PHOSPHATE SERPL-MCNC: 3.8 MG/DL (ref 2.5–4.5)
PLATELET # BLD AUTO: 47 K/UL (ref 164–446)
PLATELETS.RETICULATED NFR BLD AUTO: 1.5 % (ref 0.6–13.1)
POTASSIUM SERPL-SCNC: 4.5 MMOL/L (ref 3.6–5.5)
PROCALCITONIN SERPL-MCNC: 2.78 NG/ML
RBC # BLD AUTO: 3.42 M/UL (ref 4.2–5.4)
SODIUM SERPL-SCNC: 135 MMOL/L (ref 135–145)
WBC # BLD AUTO: 11.4 K/UL (ref 4.8–10.8)

## 2025-06-19 PROCEDURE — 700111 HCHG RX REV CODE 636 W/ 250 OVERRIDE (IP): Performed by: STUDENT IN AN ORGANIZED HEALTH CARE EDUCATION/TRAINING PROGRAM

## 2025-06-19 PROCEDURE — A9270 NON-COVERED ITEM OR SERVICE: HCPCS

## 2025-06-19 PROCEDURE — 770006 HCHG ROOM/CARE - MED/SURG/GYN SEMI*

## 2025-06-19 PROCEDURE — 99233 SBSQ HOSP IP/OBS HIGH 50: CPT

## 2025-06-19 PROCEDURE — 84145 PROCALCITONIN (PCT): CPT

## 2025-06-19 PROCEDURE — 84100 ASSAY OF PHOSPHORUS: CPT

## 2025-06-19 PROCEDURE — 83735 ASSAY OF MAGNESIUM: CPT

## 2025-06-19 PROCEDURE — 85027 COMPLETE CBC AUTOMATED: CPT

## 2025-06-19 PROCEDURE — A9270 NON-COVERED ITEM OR SERVICE: HCPCS | Performed by: STUDENT IN AN ORGANIZED HEALTH CARE EDUCATION/TRAINING PROGRAM

## 2025-06-19 PROCEDURE — 700101 HCHG RX REV CODE 250

## 2025-06-19 PROCEDURE — 700111 HCHG RX REV CODE 636 W/ 250 OVERRIDE (IP)

## 2025-06-19 PROCEDURE — 700105 HCHG RX REV CODE 258: Performed by: STUDENT IN AN ORGANIZED HEALTH CARE EDUCATION/TRAINING PROGRAM

## 2025-06-19 PROCEDURE — 700105 HCHG RX REV CODE 258

## 2025-06-19 PROCEDURE — 85055 RETICULATED PLATELET ASSAY: CPT

## 2025-06-19 PROCEDURE — 700102 HCHG RX REV CODE 250 W/ 637 OVERRIDE(OP): Performed by: STUDENT IN AN ORGANIZED HEALTH CARE EDUCATION/TRAINING PROGRAM

## 2025-06-19 PROCEDURE — 36415 COLL VENOUS BLD VENIPUNCTURE: CPT

## 2025-06-19 PROCEDURE — 700102 HCHG RX REV CODE 250 W/ 637 OVERRIDE(OP)

## 2025-06-19 PROCEDURE — 80048 BASIC METABOLIC PNL TOTAL CA: CPT

## 2025-06-19 RX ORDER — AMOXICILLIN 250 MG
2 CAPSULE ORAL EVERY EVENING
Status: DISCONTINUED | OUTPATIENT
Start: 2025-06-19 | End: 2025-06-21 | Stop reason: HOSPADM

## 2025-06-19 RX ORDER — OXYCODONE HYDROCHLORIDE 5 MG/1
5 TABLET ORAL
Refills: 0 | Status: DISCONTINUED | OUTPATIENT
Start: 2025-06-19 | End: 2025-06-21 | Stop reason: HOSPADM

## 2025-06-19 RX ORDER — GRANULES FOR ORAL 3 G/1
3 POWDER ORAL ONCE
Status: COMPLETED | OUTPATIENT
Start: 2025-06-19 | End: 2025-06-19

## 2025-06-19 RX ORDER — POLYETHYLENE GLYCOL 3350 17 G/17G
1 POWDER, FOR SOLUTION ORAL
Status: DISCONTINUED | OUTPATIENT
Start: 2025-06-19 | End: 2025-06-21 | Stop reason: HOSPADM

## 2025-06-19 RX ORDER — MAGNESIUM SULFATE HEPTAHYDRATE 40 MG/ML
2 INJECTION, SOLUTION INTRAVENOUS ONCE
Status: COMPLETED | OUTPATIENT
Start: 2025-06-19 | End: 2025-06-19

## 2025-06-19 RX ORDER — HYDROMORPHONE HYDROCHLORIDE 1 MG/ML
0.25 INJECTION, SOLUTION INTRAMUSCULAR; INTRAVENOUS; SUBCUTANEOUS
Status: DISCONTINUED | OUTPATIENT
Start: 2025-06-19 | End: 2025-06-21 | Stop reason: HOSPADM

## 2025-06-19 RX ORDER — OXYCODONE HYDROCHLORIDE 5 MG/1
2.5 TABLET ORAL
Refills: 0 | Status: DISCONTINUED | OUTPATIENT
Start: 2025-06-19 | End: 2025-06-21 | Stop reason: HOSPADM

## 2025-06-19 RX ADMIN — DOXYCYCLINE 100 MG: 100 INJECTION, POWDER, LYOPHILIZED, FOR SOLUTION INTRAVENOUS at 06:11

## 2025-06-19 RX ADMIN — OXYCODONE 5 MG: 5 TABLET ORAL at 20:14

## 2025-06-19 RX ADMIN — SPIRONOLACTONE 50 MG: 25 TABLET ORAL at 05:33

## 2025-06-19 RX ADMIN — RIFAXIMIN 550 MG: 550 TABLET ORAL at 05:33

## 2025-06-19 RX ADMIN — OMEPRAZOLE 20 MG: 20 CAPSULE, DELAYED RELEASE ORAL at 05:33

## 2025-06-19 RX ADMIN — OXYCODONE 5 MG: 5 TABLET ORAL at 14:17

## 2025-06-19 RX ADMIN — GABAPENTIN 200 MG: 100 CAPSULE ORAL at 05:33

## 2025-06-19 RX ADMIN — CEFTRIAXONE SODIUM 1 G: 1 INJECTION, POWDER, FOR SOLUTION INTRAMUSCULAR; INTRAVENOUS at 12:37

## 2025-06-19 RX ADMIN — MAGNESIUM SULFATE HEPTAHYDRATE 2 G: 2 INJECTION, SOLUTION INTRAVENOUS at 14:48

## 2025-06-19 RX ADMIN — CARVEDILOL 3.12 MG: 3.12 TABLET, FILM COATED ORAL at 18:01

## 2025-06-19 RX ADMIN — LACTULOSE 30 ML: 10 SOLUTION ORAL at 12:37

## 2025-06-19 RX ADMIN — ATORVASTATIN CALCIUM 20 MG: 20 TABLET, FILM COATED ORAL at 20:14

## 2025-06-19 RX ADMIN — GRANULES FOR ORAL SOLUTION 3 G: 3 POWDER ORAL at 12:36

## 2025-06-19 RX ADMIN — GABAPENTIN 200 MG: 100 CAPSULE ORAL at 18:01

## 2025-06-19 RX ADMIN — AMPICILLIN AND SULBACTAM 3 G: 1; 2 INJECTION, POWDER, FOR SOLUTION INTRAMUSCULAR; INTRAVENOUS at 05:34

## 2025-06-19 RX ADMIN — RIFAXIMIN 550 MG: 550 TABLET ORAL at 18:02

## 2025-06-19 RX ADMIN — FUROSEMIDE 40 MG: 40 TABLET ORAL at 05:33

## 2025-06-19 ASSESSMENT — PAIN DESCRIPTION - PAIN TYPE
TYPE: ACUTE PAIN

## 2025-06-19 ASSESSMENT — PATIENT HEALTH QUESTIONNAIRE - PHQ9
SUM OF ALL RESPONSES TO PHQ9 QUESTIONS 1 AND 2: 0
2. FEELING DOWN, DEPRESSED, IRRITABLE, OR HOPELESS: NOT AT ALL
2. FEELING DOWN, DEPRESSED, IRRITABLE, OR HOPELESS: NOT AT ALL
SUM OF ALL RESPONSES TO PHQ9 QUESTIONS 1 AND 2: 0
1. LITTLE INTEREST OR PLEASURE IN DOING THINGS: NOT AT ALL
1. LITTLE INTEREST OR PLEASURE IN DOING THINGS: NOT AT ALL

## 2025-06-19 NOTE — RESPIRATORY CARE
COPD EDUCATION by COPD CLINICAL EDUCATOR  6/19/2025 at 6:13 AM by Shannon Varela RRT     Patient reviewed by COPD education team. Patient does not have a history or diagnosis of COPD and is a a former smoker.  Therefore, patient does not qualify for the COPD program.    PFT in EMR 2/20/2020: FEV1-118, FEV1/FVC Ratio 79.9 confirms no obstructive process -normal airflow.

## 2025-06-19 NOTE — PROGRESS NOTES
4 Eyes Skin Assessment Completed by Mallory RN and DANYEL Reyes.    Skin assessment is primarily focused on high risk bony prominences. Pay special attention to skin beneath and around medical devices, high risk bony prominences, skin to skin areas and areas where the patient lacks sensation to feel pain and areas where the patient previously had breakdown.     Head (Occipital):  WDL   Ears (Under Medical Devices): WDL   Nose (Under Medical Devices): WDL   Mouth:  WDL   Neck: WDL   Breast/Chest:  WDL   Shoulder Blades:  WDL   Spine:   WDL   (R) Arm/Elbow/Hand: bruising   (L) Arm/Elbow/Hand: bruising   Abdomen: WDL   Pannus/Groin:  Yeast (red satellite lesions in skin folds, ect.)   Sacrum/Coccyx:   Brown and Blanching   (R) Ischial Tuberosity (Sit Bones):  WDL and Black   (L) Ischial Tuberosity (Sit Bones):  WDL   (R) Leg:  Scab, Brown, and Scar   (L) Leg:  WDL   (R) Heel:  WDL   (R) Foot/Toe: WDL   (L) Heel: amputated   (L) Foot/Toe:  amputated       DEVICES IN USE:   Respiratory Devices:  NA, patient on room air  Feeding Devices:  N/A   Lines & BP Monitoring Devices:  Peripheral IV and BP cuff    Orthopedic Devices:  N/A  Miscellaneous Devices:  Purewick    PROTOCOL INTERVENTIONS:   Low Airloss Bed:  Applied this assessment    WOUND PHOTOS:   Completed and in EPIC     WOUND CONSULT:   Consult ordered for the following areas

## 2025-06-19 NOTE — PROGRESS NOTES
Patient arrived to unit. Oriented to room, call light and bed controls. Patient with some c/o pain. Recent L AKA, bed alarm in place. Patient currently on 2L via NC. No coughing noted. Endorses abdominal pain without nausea. Abdomen soft. Bed in lowest locked position and patient understands NPO status

## 2025-06-19 NOTE — THERAPY
Physical Therapy Contact Note    Patient Name: April Alicia  Age:  65 y.o., Sex:  female  Medical Record #: [id number]  Today's Date: 6/19/2025 06/19/25 0825   Initial Contact Note    Initial Contact Note Order Received and Verified, Physical Therapy Evaluation in Progress with Full Report to Follow.   Interdisciplinary Plan of Care Collaboration   IDT Collaboration with  Nursing   Collaboration Comments PT orders received. Pt off floor in HIDA scan. Will re-attempt as able.   Session Information   Date / Session Number  6/19 - hold  (EVAL)     Alix Vides, PT, DPT

## 2025-06-19 NOTE — PROGRESS NOTES
Hospital Medicine Daily Progress Note    Date of Service  6/19/2025    Chief Complaint  April Alicia is a 65 y.o. female admitted 6/18/2025 with multiple complaints    Hospital Course  65-year-old female with alcohol cirrhosis, A-fib not on anticoagulation with history of GI bleed who presented 6/18/2025 with abdominal pain and dysuria.  She resides at a care facility.  Inconsistent history -initially reporting developed over the last days but sometimes she says it has been months.  She also had associated multiple nonspecific complaints including mild cough and abdominal pain.    At the ED, WBC count elevated at 14.2, platelet count 66, hemoglobin 8.8.  Creatinine 1.5, lactic acid WNL.  Urinalysis consistent with UTI.  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  CXR with new right pleural effusion associated with possible atelectasis versus infiltrate.  CT A/P with contrast showed the pleural effusion as well now with consolidation of the RLL.  There is gallbladder wall thickening versus pericholecystic fluid.  Demonstrated wall thickening in the urinary bladder may be seen with cystitis.  She was started on Unasyn and added Doxy given the pulmonary infiltrate.  Admitted for further management.    Interval Problem Update    6/19  Patient new to me today  NAEO.  HDS, on 2L NC.  BCx 6/18 NGTD.  Urine culture in process.  WBC 11.4, Hgb 9.2, Plt 47 low. Mg very low at 1.1. Crea 1.31, improving.    HIDA done - pending read.     Pt reports improving SOB from yesterday. Denies any fevers or chills, no bloody stool/vomiting, no nausea/abd pain. No UTI symptoms    Switch to Ceftriaxone. Discontinue doxycycline  Hold VTE prophylaxis     I have discussed this patient's plan of care and discharge plan at IDT rounds today with Case Management, Nursing, Nursing leadership, and other members of the IDT team.    Consultants/Specialty       Code Status  Full Code    Disposition    I have placed the  appropriate orders for post-discharge needs.    Review of Systems  ROS     Physical Exam  Temp:  [36.3 °C (97.4 °F)-37.4 °C (99.4 °F)] 36.7 °C (98 °F)  Pulse:  [63-94] 63  Resp:  [15-18] 18  BP: ()/(59-80) 128/79  SpO2:  [93 %-100 %] 100 %    Physical Exam  Constitutional:       Appearance: She is ill-appearing.   Cardiovascular:      Rate and Rhythm: Normal rate and regular rhythm.      Pulses: Normal pulses.      Heart sounds: Normal heart sounds.   Pulmonary:      Effort: Pulmonary effort is normal.      Breath sounds: Normal breath sounds.   Abdominal:      General: Abdomen is flat. Bowel sounds are normal. There is no distension.      Palpations: Abdomen is soft.      Tenderness: There is no abdominal tenderness. There is no guarding.   Skin:     General: Skin is warm.      Capillary Refill: Capillary refill takes less than 2 seconds.   Neurological:      Mental Status: She is oriented to person, place, and time. Mental status is at baseline.         Fluids    Intake/Output Summary (Last 24 hours) at 6/19/2025 1623  Last data filed at 6/19/2025 1417  Gross per 24 hour   Intake 620 ml   Output --   Net 620 ml        Laboratory  Recent Labs     06/18/25  1132 06/19/25  1049   WBC 14.2* 11.4*   RBC 3.28* 3.42*   HEMOGLOBIN 8.8* 9.2*   HEMATOCRIT 28.2* 29.1*   MCV 86.0 85.1   MCH 26.8* 26.9*   MCHC 31.2* 31.6*   RDW 52.5* 52.3*   PLATELETCT 66* 47*     Recent Labs     06/18/25  1132 06/19/25  1049   SODIUM 134* 135   POTASSIUM 4.7 4.5   CHLORIDE 106 106   CO2 20 18*   GLUCOSE 127* 91   BUN 30* 33*   CREATININE 1.50* 1.31   CALCIUM 7.5* 7.2*                   Imaging        Assessment/Plan  * UTI (urinary tract infection)- (present on admission)  Assessment & Plan  Previously urine cultures have grown multidrug-resistant E. coli however is sensitive to Unasyn  Also has pulmonary infiltrate  Switch to Ceftriaxone pending further work up, follow cultures      Thickening of wall of gallbladder- (present on  admission)  Assessment & Plan  Has been seen previously on imaging and redemonstrated now  GB wall thickening vs pericholecystic fluid seen on CT A/P   HIDA done - pending read  Negative Irving sign and abdominal exam benign other than generalized mild tenderness which may be attributable to UTI    Hypomagnesemia- (present on admission)  Assessment & Plan  IV Supplementation ordered    Thrombocytopenia (HCC)- (present on admission)  Assessment & Plan  No overt bleeding. In setting of known cirrhosis and now with infection. Continue to monitor.     Alcoholic cirrhosis (HCC)- (present on admission)  Assessment & Plan  Continuing Lasix and spironolactone as well as rifaximin and lactulose         VTE prophylaxis:   SCDs/TEDs      I have performed a physical exam and reviewed and updated ROS and Plan today (6/19/2025). In review of yesterday's note (6/18/2025), there are no changes except as documented above.    Greater than 51 minutes spent prepping to see patient (e.g. review of tests) obtaining and/or reviewing separately obtained history. Performing a medically appropriate examination and evaluation.  Counseling and educating the patient/family/caregiver.  Ordering medications, tests, or procedures.  Referring and communicating with other health care professionals.  Documenting clinical information in EPIC.  Independently interpreting results and communicating results to patient/family/caregiver.  Care coordination.

## 2025-06-19 NOTE — CARE PLAN
The patient is Stable - Low risk of patient condition declining or worsening    Shift Goals  Clinical Goals: Patient pain will be 3/10 or belwo during course of shift with PRN pain medication as per orderd  Patient Goals: HIDA scan, rest,  Family Goals: EDMUND    Progress made toward(s) clinical / shift goals:      Problem: Knowledge Deficit - Standard  Goal: Patient and family/care givers will demonstrate understanding of plan of care, disease process/condition, diagnostic tests and medications  Outcome: Progressing    Patient educated on plan of care, medications, and procedures. Patient is Aox4. Patient verbalizes understanding of care. Will continue to update patient on Plan of care during shift.     Problem: Pain - Standard  Goal: Alleviation of pain or a reduction in pain to the patient’s comfort goal  Outcome: Progressing    Patient had 8/10 pain in abdomen during shift. Pain managed with PRN pain medications as per ordered in Mar.      Problem: Skin Integrity  Goal: Skin integrity is maintained or improved  Outcome: Progressing    Patient incontinent. Patient placed on low airloss and has wedges in place for postioning. Barrier placed on sacrum for prevention.     Patient is not progressing towards the following goals:

## 2025-06-19 NOTE — PROGRESS NOTES
Patient arrived back to unit by wheelchair. Patient transferred to bed. Patient bed is in low, locked position with bed alarm on. Standard fall precautions are in place. Personal belonging and call light are within reach. Patient reinforced to use call light when needed.

## 2025-06-19 NOTE — PROGRESS NOTES
Patient report received and assumed care. Assessed patient at 0715. Patient is Aox4 and hard of hearing. Patient has a left BKA. Patient is x2 assist transfer to wheelchair. Patient incontinent. Patient on 2L at baseline. Patient bed is in low, locked position with bed alarm on. Standard fall precautions are in place. Personal belonging and call light are within reach. Patient reinforced to use call light when needed.      Patient left unit for HIDA scan at 0725 by wheelchair. Report given to transport.

## 2025-06-19 NOTE — CARE PLAN
The patient is Stable - Low risk of patient condition declining or worsening    Shift Goals  Clinical Goals: pain control, IV antibiotics, q2 turns, NPO  Patient Goals: eat, pain control, rest  Family Goals: NA    Progress made toward(s) clinical / shift goals:  No PRN medications ordered at this time for pain, compliant with q2 turns, NPO at this time. Uses call light appropriately       Problem: Pain - Standard  Goal: Alleviation of pain or a reduction in pain to the patient’s comfort goal  Outcome: Progressing     Problem: Knowledge Deficit - Standard  Goal: Patient and family/care givers will demonstrate understanding of plan of care, disease process/condition, diagnostic tests and medications  Outcome: Progressing     Problem: Skin Integrity  Goal: Skin integrity is maintained or improved  Outcome: Progressing       Patient is not progressing towards the following goals:

## 2025-06-19 NOTE — DISCHARGE PLANNING
Pt resides at [facility]. Pt is wc bound and receives assistance with ADL's and IADL's.   Anticipate return to [facility] when medically cleared      Care Transition Team Assessment    Information Source  Orientation Level: Oriented X4  Information Given By: Patient  Who is responsible for making decisions for patient? : Patient    Readmission Evaluation  Is this a readmission?: No    Elopement Risk  Legal Hold: No  Ambulatory or Self Mobile in Wheelchair: No-Not an Elopement Risk  Disoriented: No  Psychiatric Symptoms: None  History of Wandering: No  Elopement this Admit: No  Vocalizing Wanting to Leave: No  Displays Behaviors, Body Language Wanting to Leave: No-Not at Risk for Elopement  Elopement Risk: Not at Risk for Elopement    Interdisciplinary Discharge Planning  Lives with - Patient's Self Care Capacity: Attendant / Paid Care Giver  Patient or legal guardian wants to designate a caregiver: No  Support Systems: Friends / Neighbors, Children  Housing / Facility: Other (Comments)  Name of Care Facility: [facility]  What is your plan after discharge?: Skilled nursing facility  What are your discharge supports?: Child  Prior Functional Level: Needs Assist with Medication Management, Needs Assist with Activities of Daily Living, Uses Wheelchair    Functional Assesment  Prior Functional Level: Needs Assist with Medication Management, Needs Assist with Activities of Daily Living, Uses Wheelchair    Finances  Financial Barriers to Discharge: No  Prescription Coverage: Yes    Vision / Hearing Impairment  Vision Impairment : Yes  Right Eye Vision: Impaired, Wears Glasses  Left Eye Vision: Impaired, Wears Glasses  Hearing Impairment : Yes  Hearing Impairment: Both Ears, Hearing Device(s) Available         Advance Directive  Advance Directive?: POLST, DPOA for Health Care  Durable Power of  Name and Contact : Ava Laureano    Domestic Abuse  Possible Abuse/Neglect Reported to:: Not  Applicable    Psychological Assessment  History of Substance Abuse: None  History of Psychiatric Problems: No  Non-compliant with Treatment: Yes  Newly Diagnosed Illness: Yes    Discharge Risks or Barriers  Discharge risks or barriers?: No    Anticipated Discharge Information  Discharge Disposition: D/T to Medicaid only Nursing home (64)  Discharge Address: [address] [place] [id number]  Discharge Contact Phone Number: [phone]

## 2025-06-19 NOTE — HOSPITAL COURSE
65-year-old female with alcohol cirrhosis, A-fib not on anticoagulation with history of GI bleed who presented 6/18/2025 with abdominal pain and dysuria.  She resides at a care facility.  Inconsistent history -initially reporting developed over the last days but sometimes she says it has been months.  She also had associated multiple nonspecific complaints including mild cough and abdominal pain.    At the ED, WBC count elevated at 14.2, platelet count 66, hemoglobin 8.8.  Creatinine 1.5, lactic acid WNL.  Urinalysis consistent with UTI.  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  CXR with new right pleural effusion associated with possible atelectasis versus infiltrate.  CT A/P with contrast showed the pleural effusion as well now with consolidation of the RLL.  There is gallbladder wall thickening versus pericholecystic fluid.  Demonstrated wall thickening in the urinary bladder may be seen with cystitis.  She was started on Unasyn and added Doxy given the pulmonary infiltrate.   Admitted for further management of infection (UTI, pneumonia).    Patient's symptoms improved while admitted and switched to IV ceftriaxone. HIDA scan was normal.  Blood culture 6/18/2025 remained negative.  Urine culture 6/18 was collected however the culture result was of doubtful clinical significance as per the culture result it had 3 or more organisms isolated and culture was of doubtful significance.  On 6/21, patient was hemodynamically stable overnight and on bedside exam was awake and alert, conversant, reports significant improvement of symptoms.  She states she feels overall well to go to facility.  Medically cleared for discharge 6/21. She was given a dose of fosfomycin on 6/19. Pt given another dose of fosfomycin prior to discharge for the UTI. Pt to be discharged with Augmentin. Discussed with pharmacy.

## 2025-06-19 NOTE — DOCUMENTATION QUERY
"                                                                         [facility]                                                                       Query Response Note      PATIENT:               FRANCISCA TURNER  ACCT #:                  [id number]  MRN:                     [id number]  :                      1960  ADMIT DATE:       2025 11:12 AM  DISCH DATE:          RESPONDING  PROVIDER #:        [id number]           QUERY TEXT:    Pt has documented Pneumonia noted as ?rule out? or similar terminology such as suspected, probable, etc.     Please clarify status of this condition.    Note: If a query response diagnosis is provided, please include it in your daily notes & DC Summary    Radha Gonzales  RN-CDIS  [address] [place] [address]  [id number]  [email or web address]  Connect via [employer] Messenger    The patient's Clinical Indicators include:  ED \"...added doxycycline for the possibility of a hidden pneumonia in the area of that right pleural effusion...chest pain, which began one month ago...She notes she had a fever with a temperature of 101°F last night...\"    WBC 14.2, Neutrophils 91.10; absolute 12.90  CXR New right pleural effusion with associated atelectasis versus infiltrate  CTA New right pleural effusion with collapse and consolidation of the right lower lobe.    Risk Factors- Resides in care facility, CHF, Alcoholic cirrhosis, COPD with home O2/albuterol  Treatment: Doxycycline, unasyn, oxygen  Options provided:   -- Treating Gram negative pneumonia   -- Treating Pneumonia, unspecified   -- Pneumonia is ruled out   -- Other explanation, (please specify other explanation)   -- Unable to determine      Query created by: Radha Gonzales on 2025 6:05 AM    RESPONSE TEXT:    Treating Pneumonia, unspecified          Electronically signed by:  TONY WOODSON MD 2025 8:04 AM              "

## 2025-06-19 NOTE — CARE PLAN
The patient is Stable - Low risk of patient condition declining or worsening    Shift Goals  Clinical Goals: Iv abx, prep for HIDA scan, free from fall, maintain skin integrity  Patient Goals: eat and comfort  Family Goals: NA    Progress made toward(s) clinical / shift goals:  pt educated and informed in prep of her HIDA scan, instructed to be NPO midnight, pt turned q2 hrs, changed pads every incontinence, bed alarms on at all times, remained free from fall    Problem: Skin Integrity  Goal: Skin integrity is maintained or improved  Outcome: Progressing     Problem: Fall Risk  Goal: Patient will remain free from falls  Outcome: Progressing       Patient is not progressing towards the following goals:

## 2025-06-20 ENCOUNTER — APPOINTMENT (OUTPATIENT)
Dept: RADIOLOGY | Facility: MEDICAL CENTER | Age: 65
DRG: 689 | End: 2025-06-20
Payer: MEDICARE

## 2025-06-20 LAB
ANION GAP SERPL CALC-SCNC: 9 MMOL/L (ref 7–16)
BACTERIA UR CULT: NORMAL
BUN SERPL-MCNC: 32 MG/DL (ref 8–22)
CALCIUM SERPL-MCNC: 7.1 MG/DL (ref 8.5–10.5)
CHLORIDE SERPL-SCNC: 104 MMOL/L (ref 96–112)
CO2 SERPL-SCNC: 20 MMOL/L (ref 20–33)
CREAT SERPL-MCNC: 1.07 MG/DL (ref 0.5–1.4)
ERYTHROCYTE [DISTWIDTH] IN BLOOD BY AUTOMATED COUNT: 49.9 FL (ref 35.9–50)
GFR SERPLBLD CREATININE-BSD FMLA CKD-EPI: 58 ML/MIN/1.73 M 2
GLUCOSE SERPL-MCNC: 94 MG/DL (ref 65–99)
HCT VFR BLD AUTO: 25.6 % (ref 37–47)
HGB BLD-MCNC: 8.3 G/DL (ref 12–16)
MAGNESIUM SERPL-MCNC: 1.4 MG/DL (ref 1.5–2.5)
MCH RBC QN AUTO: 26.7 PG (ref 27–33)
MCHC RBC AUTO-ENTMCNC: 32.4 G/DL (ref 32.2–35.5)
MCV RBC AUTO: 82.3 FL (ref 81.4–97.8)
PHOSPHATE SERPL-MCNC: 3.1 MG/DL (ref 2.5–4.5)
PLATELET # BLD AUTO: 49 K/UL (ref 164–446)
PLATELETS.RETICULATED NFR BLD AUTO: 1.6 % (ref 0.6–13.1)
PMV BLD AUTO: 10 FL (ref 9–12.9)
POTASSIUM SERPL-SCNC: 4.2 MMOL/L (ref 3.6–5.5)
RBC # BLD AUTO: 3.11 M/UL (ref 4.2–5.4)
SIGNIFICANT IND 70042: NORMAL
SITE SITE: NORMAL
SODIUM SERPL-SCNC: 133 MMOL/L (ref 135–145)
SOURCE SOURCE: NORMAL
WBC # BLD AUTO: 8.1 K/UL (ref 4.8–10.8)

## 2025-06-20 PROCEDURE — 80048 BASIC METABOLIC PNL TOTAL CA: CPT

## 2025-06-20 PROCEDURE — 99232 SBSQ HOSP IP/OBS MODERATE 35: CPT

## 2025-06-20 PROCEDURE — 700102 HCHG RX REV CODE 250 W/ 637 OVERRIDE(OP)

## 2025-06-20 PROCEDURE — 700102 HCHG RX REV CODE 250 W/ 637 OVERRIDE(OP): Performed by: NURSE PRACTITIONER

## 2025-06-20 PROCEDURE — 700101 HCHG RX REV CODE 250

## 2025-06-20 PROCEDURE — 73030 X-RAY EXAM OF SHOULDER: CPT | Mod: RT

## 2025-06-20 PROCEDURE — 83735 ASSAY OF MAGNESIUM: CPT

## 2025-06-20 PROCEDURE — 85055 RETICULATED PLATELET ASSAY: CPT

## 2025-06-20 PROCEDURE — A9270 NON-COVERED ITEM OR SERVICE: HCPCS | Performed by: STUDENT IN AN ORGANIZED HEALTH CARE EDUCATION/TRAINING PROGRAM

## 2025-06-20 PROCEDURE — 84100 ASSAY OF PHOSPHORUS: CPT

## 2025-06-20 PROCEDURE — 770006 HCHG ROOM/CARE - MED/SURG/GYN SEMI*

## 2025-06-20 PROCEDURE — A9270 NON-COVERED ITEM OR SERVICE: HCPCS

## 2025-06-20 PROCEDURE — 700111 HCHG RX REV CODE 636 W/ 250 OVERRIDE (IP): Mod: JZ

## 2025-06-20 PROCEDURE — 700102 HCHG RX REV CODE 250 W/ 637 OVERRIDE(OP): Performed by: STUDENT IN AN ORGANIZED HEALTH CARE EDUCATION/TRAINING PROGRAM

## 2025-06-20 PROCEDURE — A9270 NON-COVERED ITEM OR SERVICE: HCPCS | Performed by: NURSE PRACTITIONER

## 2025-06-20 PROCEDURE — 85027 COMPLETE CBC AUTOMATED: CPT

## 2025-06-20 PROCEDURE — 36415 COLL VENOUS BLD VENIPUNCTURE: CPT

## 2025-06-20 RX ORDER — NYSTATIN 100000 [USP'U]/G
POWDER TOPICAL 2 TIMES DAILY
Status: DISCONTINUED | OUTPATIENT
Start: 2025-06-20 | End: 2025-06-21 | Stop reason: HOSPADM

## 2025-06-20 RX ADMIN — GABAPENTIN 200 MG: 100 CAPSULE ORAL at 17:34

## 2025-06-20 RX ADMIN — FUROSEMIDE 40 MG: 40 TABLET ORAL at 05:04

## 2025-06-20 RX ADMIN — OMEPRAZOLE 20 MG: 20 CAPSULE, DELAYED RELEASE ORAL at 05:04

## 2025-06-20 RX ADMIN — RIFAXIMIN 550 MG: 550 TABLET ORAL at 05:04

## 2025-06-20 RX ADMIN — NYSTATIN: 100000 POWDER TOPICAL at 17:34

## 2025-06-20 RX ADMIN — OXYCODONE 5 MG: 5 TABLET ORAL at 17:35

## 2025-06-20 RX ADMIN — OXYCODONE 5 MG: 5 TABLET ORAL at 02:17

## 2025-06-20 RX ADMIN — ATORVASTATIN CALCIUM 20 MG: 20 TABLET, FILM COATED ORAL at 20:59

## 2025-06-20 RX ADMIN — GABAPENTIN 200 MG: 100 CAPSULE ORAL at 05:04

## 2025-06-20 RX ADMIN — RIFAXIMIN 550 MG: 550 TABLET ORAL at 17:34

## 2025-06-20 RX ADMIN — SPIRONOLACTONE 50 MG: 25 TABLET ORAL at 05:04

## 2025-06-20 RX ADMIN — NYSTATIN: 100000 POWDER TOPICAL at 05:04

## 2025-06-20 RX ADMIN — OXYCODONE 5 MG: 5 TABLET ORAL at 21:00

## 2025-06-20 RX ADMIN — WATER 1 G: 1 INJECTION INTRAMUSCULAR; INTRAVENOUS; SUBCUTANEOUS at 12:00

## 2025-06-20 RX ADMIN — LACTULOSE 30 ML: 10 SOLUTION ORAL at 08:24

## 2025-06-20 RX ADMIN — OXYCODONE 5 MG: 5 TABLET ORAL at 08:41

## 2025-06-20 RX ADMIN — CARVEDILOL 3.12 MG: 3.12 TABLET, FILM COATED ORAL at 08:25

## 2025-06-20 RX ADMIN — Medication 5 MG: at 21:43

## 2025-06-20 RX ADMIN — CARVEDILOL 3.12 MG: 3.12 TABLET, FILM COATED ORAL at 17:34

## 2025-06-20 ASSESSMENT — PAIN DESCRIPTION - PAIN TYPE
TYPE: ACUTE PAIN

## 2025-06-20 NOTE — PROGRESS NOTES
4 Eyes Skin Assessment Completed by DANYEL Huddleston and DANYEL Sinha.    Skin assessment is primarily focused on high risk bony prominences. Pay special attention to skin beneath and around medical devices, high risk bony prominences, skin to skin areas and areas where the patient lacks sensation to feel pain and areas where the patient previously had breakdown.     Head (Occipital):  WDL   Ears (Under Medical Devices): WDL   Nose (Under Medical Devices): WDL   Mouth:  WDL   Neck: WDL   Breast/Chest:  WDL   Shoulder Blades:  WDL   Spine:   WDL   (R) Arm/Elbow/Hand: Bruising   (L) Arm/Elbow/Hand: Bruising   Abdomen: WDL   Pannus/Groin:  Red, Blanching, and Moisture   Sacrum/Coccyx:   Pink and Blanching, previous healed DTI   (R) Ischial Tuberosity (Sit Bones):  Pink and Blanching   (L) Ischial Tuberosity (Sit Bones):  Pink and Blanching   (R) Leg:  amputation   (L) Leg:  Scab, Pink, and Shiny, patient has skin graft done   (R) Heel:  amputation   (R) Foot/Toe: amputation   (L) Heel: WDL, dry   (L) Foot/Toe:  Missing toe nail       DEVICES IN USE:   Respiratory Devices:  Nasal cannula  Feeding Devices:  N/A   Lines & BP Monitoring Devices:  Peripheral IV and Pulse ox    Orthopedic Devices:  Wheelchair bound  Miscellaneous Devices:  Purewick    PROTOCOL INTERVENTIONS:   Low Airloss Bed:  Already in place  Float Heels with Pillows:  Already in place  Q2 Turns with Wedges:  Already in place  Glide Sheet:  Already in place  Barrier Paste:  Already in place  Interdry:  Already in place  Nasal Cannula with Gray Foams:  Already in place    WOUND PHOTOS:   Completed and in EPIC     WOUND CONSULT:   Wound team already following area(s) of concern

## 2025-06-20 NOTE — PROGRESS NOTES
4 Eyes Skin Assessment Completed by DANYEL Tejada and DANYEL Burnham.    Skin assessment is primarily focused on high risk bony prominences. Pay special attention to skin beneath and around medical devices, high risk bony prominences, skin to skin areas and areas where the patient lacks sensation to feel pain and areas where the patient previously had breakdown.     Head (Occipital):  WDL   Ears (Under Medical Devices): WDL   Nose (Under Medical Devices): WDL   Mouth:  WDL   Neck: WDL   Breast/Chest:  WDL   Shoulder Blades:  WDL   Spine:   WDL   (R) Arm/Elbow/Hand: Bruising on forearm   (L) Arm/Elbow/Hand: Bruising on fprearm   Abdomen: WDL   Pannus/Groin:  Rash, Red, and Moisture   Sacrum/Coccyx:   Pink and Blanching   (R) Ischial Tuberosity (Sit Bones):  WDL   (L) Ischial Tuberosity (Sit Bones):  WDL   (R) Leg:  Scab, Brown, and Scar   (L) Leg:  AKA old   (R) Heel:  WDL   (R) Foot/Toe: WDL   (L) Heel: NA amputated   (L) Foot/Toe:  NA amputated       DEVICES IN USE:   Respiratory Devices:  Nasal cannula  Feeding Devices:  N/A   Lines & BP Monitoring Devices:  Peripheral IV    Orthopedic Devices:  Wheelchair bound  Miscellaneous Devices:  Purewick    PROTOCOL INTERVENTIONS:   Low Airloss Bed:  Already in place  Float heels with pillows placed  Q2 turns with wedges placed  Glide sheet placed  Antifungal therapy placed  Nasal cannula with gray foams placed    WOUND PHOTOS:   Completed and in EPIC     WOUND CONSULT:   Consult ordered for the following areas

## 2025-06-20 NOTE — CARE PLAN
The patient is Stable - Low risk of patient condition declining or worsening    Shift Goals  Clinical Goals: pt will be free from fall, pain will be reduced to 3/10  Patient Goals: pain control  Family Goals: EDMUND    Progress made toward(s) clinical / shift goals:  pt rounded q2 hours, and q2 turns done, pt changed every after incontinent episodes, medicated for pain per protocol, bed alarm on and on low locked position    Patient is not progressing towards the following goals:

## 2025-06-20 NOTE — PROGRESS NOTES
Assumed care of patient at 0710. Received Report from Mallory RN Patient A&Ox4, on 2L and not on apparent distress,Call light within reach, fall prevention education given belongings within reach, bed alarm is on and bed in lowest position. All questions answered, plan of care discussed and pt verbalized understanding

## 2025-06-20 NOTE — DISCHARGE PLANNING
Case Management Discharge Planning    Admission Date: 6/18/2025  GMLOS: 3.8  ALOS: 2    6-Clicks ADL Score: 17  6-Clicks Mobility Score: 13    Anticipated Discharge Dispo: Discharge Disposition: D/T to Medicaid only Nursing home (64)  Discharge Address: [facility] [place] [address]  Discharge Contact Phone Number: [phone]    DME Needed: No    Action(s) Taken: OTHER    SW reached out to [facility] coordinator to discuss possible medical clearance for patient tomorrow. Per Rochelle, can accept patient back tomorrow. HCM to call tomorrow to confirm medical clearance and transport.    Escalations Completed: None    Medically Clear: No    Next Steps: F/U with medical team to discuss discharge needs and barriers.    Barriers to Discharge: Medical clearance and Transportation    Is the patient up for discharge tomorrow: No

## 2025-06-20 NOTE — PROGRESS NOTES
Hospital Medicine Daily Progress Note    Date of Service  6/20/2025    Chief Complaint  April Alicia is a 65 y.o. female admitted 6/18/2025 with multiple complaints    Hospital Course  65-year-old female with alcohol cirrhosis, A-fib not on anticoagulation with history of GI bleed who presented 6/18/2025 with abdominal pain and dysuria.  She resides at a care facility.  Inconsistent history -initially reporting developed over the last days but sometimes she says it has been months.  She also had associated multiple nonspecific complaints including mild cough and abdominal pain.    At the ED, WBC count elevated at 14.2, platelet count 66, hemoglobin 8.8.  Creatinine 1.5, lactic acid WNL.  Urinalysis consistent with UTI.  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  CXR with new right pleural effusion associated with possible atelectasis versus infiltrate.  CT A/P with contrast showed the pleural effusion as well now with consolidation of the RLL.  There is gallbladder wall thickening versus pericholecystic fluid.  Demonstrated wall thickening in the urinary bladder may be seen with cystitis.  She was started on Unasyn and added Doxy given the pulmonary infiltrate.  Admitted for further management.    Interval Problem Update    6/20  NAEO.  HDS, on RA.  Afebrile.  On ceftriaxone.  For bowel movements.  UCX 6/18/2025 with E. coli, susceptibilities in progress.  BCx 6/18 NGTD.  Continue ABx        I have discussed this patient's plan of care and discharge plan at IDT rounds today with Case Management, Nursing, Nursing leadership, and other members of the IDT team.    Consultants/Specialty       Code Status  Full Code    Disposition    I have placed the appropriate orders for post-discharge needs.    Review of Systems  ROS     Physical Exam  Temp:  [36.4 °C (97.6 °F)-36.8 °C (98.2 °F)] 36.8 °C (98.2 °F)  Pulse:  [63-75] 71  Resp:  [17-18] 17  BP: (104-150)/(74-90) 104/74  SpO2:  [97 %-100 %] 100  %    Physical Exam  Constitutional:       Appearance: She is ill-appearing.   Cardiovascular:      Rate and Rhythm: Normal rate and regular rhythm.      Pulses: Normal pulses.      Heart sounds: Normal heart sounds.   Pulmonary:      Effort: Pulmonary effort is normal.      Breath sounds: Normal breath sounds.   Abdominal:      General: Abdomen is flat. Bowel sounds are normal. There is no distension.      Palpations: Abdomen is soft.      Tenderness: There is no abdominal tenderness. There is no guarding.   Skin:     General: Skin is warm.      Capillary Refill: Capillary refill takes less than 2 seconds.   Neurological:      Mental Status: She is oriented to person, place, and time. Mental status is at baseline.         Fluids    Intake/Output Summary (Last 24 hours) at 6/20/2025 0627  Last data filed at 6/19/2025 2200  Gross per 24 hour   Intake 720 ml   Output --   Net 720 ml        Laboratory  Recent Labs     06/18/25  1132 06/19/25  1049   WBC 14.2* 11.4*   RBC 3.28* 3.42*   HEMOGLOBIN 8.8* 9.2*   HEMATOCRIT 28.2* 29.1*   MCV 86.0 85.1   MCH 26.8* 26.9*   MCHC 31.2* 31.6*   RDW 52.5* 52.3*   PLATELETCT 66* 47*     Recent Labs     06/18/25  1132 06/19/25  1049   SODIUM 134* 135   POTASSIUM 4.7 4.5   CHLORIDE 106 106   CO2 20 18*   GLUCOSE 127* 91   BUN 30* 33*   CREATININE 1.50* 1.31   CALCIUM 7.5* 7.2*                   Imaging        Assessment/Plan  * UTI (urinary tract infection)- (present on admission)  Assessment & Plan  Previously urine cultures have grown multidrug-resistant E. coli however is sensitive to Unasyn  Also has pulmonary infiltrate  Switch to Ceftriaxone pending further work up, follow cultures      Thickening of wall of gallbladder- (present on admission)  Assessment & Plan  Has been seen previously on imaging and redemonstrated now  GB wall thickening vs pericholecystic fluid seen on CT A/P   HIDA done - pending read  Negative Irving sign and abdominal exam benign other than generalized  mild tenderness which may be attributable to UTI    Hypomagnesemia- (present on admission)  Assessment & Plan  IV Supplementation ordered    Thrombocytopenia (HCC)- (present on admission)  Assessment & Plan  No overt bleeding. In setting of known cirrhosis and now with infection. Continue to monitor.     Alcoholic cirrhosis (HCC)- (present on admission)  Assessment & Plan  Continuing Lasix and spironolactone as well as rifaximin and lactulose         VTE prophylaxis: VTE Selection    I have performed a physical exam and reviewed and updated ROS and Plan today (6/20/2025). In review of yesterday's note (6/19/2025), there are no changes except as documented above.    Greater than 51 minutes spent prepping to see patient (e.g. review of tests) obtaining and/or reviewing separately obtained history. Performing a medically appropriate examination and evaluation.  Counseling and educating the patient/family/caregiver.  Ordering medications, tests, or procedures.  Referring and communicating with other health care professionals.  Documenting clinical information in EPIC.  Independently interpreting results and communicating results to patient/family/caregiver.  Care coordination.

## 2025-06-20 NOTE — CARE PLAN
Problem: Knowledge Deficit - Standard  Goal: Patient and family/care givers will demonstrate understanding of plan of care, disease process/condition, diagnostic tests and medications  Outcome: Progressing   The patient is Stable - Low risk of patient condition declining or worsening    Shift Goals  Clinical Goals: Bed bath  Patient Goals: Bed bath  Family Goals: EDMUND    Progress made toward(s) clinical / shift goals:  Bed bath completed.    Patient is not progressing towards the following goals:

## 2025-06-20 NOTE — THERAPY
Physical Therapy Contact Note    Patient Name: April Alicia  Age:  65 y.o., Sex:  female  Medical Record #: [id number]  Today's Date: 6/20/2025 06/20/25 0812   Initial Contact Note    Initial Contact Note Order Received and Verified. Physical Therapy Evaluation NOT Completed Because Patient Does Not Require Acute Physical Therapy at this Time.   Interdisciplinary Plan of Care Collaboration   IDT Collaboration with  Nursing;Other (See Comments)  (EMR)   Collaboration Comments PT orders received. Per RN and CM, pt is a long term SNF resident and receives assistance for all mobility and ADL's. Pt is pending return to [facility] once medically cleared. No new acute care PT needs at this time.   Session Information   Date / Session Number  6/20 - no needs     Alix Vides, PT, DPT

## 2025-06-21 VITALS
WEIGHT: 230.56 LBS | HEART RATE: 76 BPM | TEMPERATURE: 97.8 F | DIASTOLIC BLOOD PRESSURE: 65 MMHG | SYSTOLIC BLOOD PRESSURE: 113 MMHG | RESPIRATION RATE: 12 BRPM | HEIGHT: 67 IN | BODY MASS INDEX: 36.19 KG/M2 | OXYGEN SATURATION: 96 %

## 2025-06-21 PROBLEM — K82.8 THICKENING OF WALL OF GALLBLADDER: Status: RESOLVED | Noted: 2023-04-19 | Resolved: 2025-06-21

## 2025-06-21 PROBLEM — E83.42 HYPOMAGNESEMIA: Status: RESOLVED | Noted: 2023-01-02 | Resolved: 2025-06-21

## 2025-06-21 PROCEDURE — 97602 WOUND(S) CARE NON-SELECTIVE: CPT

## 2025-06-21 PROCEDURE — 700102 HCHG RX REV CODE 250 W/ 637 OVERRIDE(OP)

## 2025-06-21 PROCEDURE — A9270 NON-COVERED ITEM OR SERVICE: HCPCS

## 2025-06-21 PROCEDURE — 700105 HCHG RX REV CODE 258

## 2025-06-21 PROCEDURE — 700111 HCHG RX REV CODE 636 W/ 250 OVERRIDE (IP)

## 2025-06-21 PROCEDURE — 700102 HCHG RX REV CODE 250 W/ 637 OVERRIDE(OP): Performed by: STUDENT IN AN ORGANIZED HEALTH CARE EDUCATION/TRAINING PROGRAM

## 2025-06-21 PROCEDURE — A9270 NON-COVERED ITEM OR SERVICE: HCPCS | Performed by: STUDENT IN AN ORGANIZED HEALTH CARE EDUCATION/TRAINING PROGRAM

## 2025-06-21 PROCEDURE — 700101 HCHG RX REV CODE 250

## 2025-06-21 PROCEDURE — 99239 HOSP IP/OBS DSCHRG MGMT >30: CPT

## 2025-06-21 RX ORDER — GRANULES FOR ORAL 3 G/1
3 POWDER ORAL ONCE
Status: COMPLETED | OUTPATIENT
Start: 2025-06-21 | End: 2025-06-21

## 2025-06-21 RX ORDER — OXYCODONE HYDROCHLORIDE 5 MG/1
5 TABLET ORAL EVERY 6 HOURS PRN
Qty: 15 TABLET | Refills: 0 | Status: SHIPPED | OUTPATIENT
Start: 2025-06-21 | End: 2025-06-26

## 2025-06-21 RX ADMIN — CARVEDILOL 3.12 MG: 3.12 TABLET, FILM COATED ORAL at 08:25

## 2025-06-21 RX ADMIN — CEFTRIAXONE SODIUM 1000 MG: 10 INJECTION, POWDER, FOR SOLUTION INTRAVENOUS at 10:40

## 2025-06-21 RX ADMIN — GABAPENTIN 200 MG: 100 CAPSULE ORAL at 05:02

## 2025-06-21 RX ADMIN — OXYCODONE 5 MG: 5 TABLET ORAL at 05:05

## 2025-06-21 RX ADMIN — NYSTATIN: 100000 POWDER TOPICAL at 05:05

## 2025-06-21 RX ADMIN — SPIRONOLACTONE 50 MG: 25 TABLET ORAL at 05:03

## 2025-06-21 RX ADMIN — OMEPRAZOLE 20 MG: 20 CAPSULE, DELAYED RELEASE ORAL at 05:03

## 2025-06-21 RX ADMIN — GRANULES FOR ORAL SOLUTION 3 G: 3 POWDER ORAL at 10:38

## 2025-06-21 RX ADMIN — FUROSEMIDE 40 MG: 40 TABLET ORAL at 05:03

## 2025-06-21 RX ADMIN — OXYCODONE 5 MG: 5 TABLET ORAL at 11:32

## 2025-06-21 RX ADMIN — RIFAXIMIN 550 MG: 550 TABLET ORAL at 05:03

## 2025-06-21 ASSESSMENT — PAIN DESCRIPTION - PAIN TYPE
TYPE: ACUTE PAIN

## 2025-06-21 NOTE — WOUND TEAM
[facility]  Inpatient Services  Wound and Skin Care Brief Evaluation    Admission Date: 6/18/2025     Last order of IP CONSULT TO WOUND CARE was found on 6/19/2025 from Hospital Encounter on 6/18/2025     HPI, PMH, SH: Reviewed    Chief Complaint   Patient presents with    Painful Urination     Pt BIBA from [facility]. Pt complaint of painful urination x3 days.      Diagnosis: UTI (urinary tract infection) [N39.0]    Unit where seen by Wound Team: S519/02     Wound consult placed regarding sacrum. Chart and images reviewed.This clinician in to assess patient. Patient pleasant and agreeable. Patient with some moisture-associated skin damage to sacrum. There is a small area on sacrum that is peeling, but blanching. Barrier cream applied to provide protective layer against moisture and bacteria. The zinc present in cream may help reduce erythema and irritation while providing hydration. Non-selectively debrided with Perineal Wipes (Barrier wipes).     No pressure injuries or advanced wound care needs identified. Wound consult completed. No further follow up unless indicated and consulted.      Right heel (L BKA)      Sacrum         PREVENTATIVE INTERVENTIONS:    Q shift Sincere - performed per nursing policy  Q shift pressure point assessments - performed per nursing policy    Surface/Positioning  ICU Low Airloss - Currently in Place  Reposition q 2 hours with wedges - Currently in Place    Offloading/Redistribution  Heel offloading dressing (Silicone dressing) - Currently in Place and Applied to Right side      Respiratory  Silicone O2 tubing - Currently in Place    Containment/Moisture Prevention    Dri-bello pad - Currently in Place  Purwick/Condom Cath - Currently in Place  Barrier paste - Currently in Place

## 2025-06-21 NOTE — DISCHARGE SUMMARY
Discharge Summary    CHIEF COMPLAINT ON ADMISSION  Chief Complaint   Patient presents with    Painful Urination     Pt BIBA from [place]. Pt complaint of painful urination x3 days.        Reason for Admission  UTI (urinary tract infection)     Admission Date  6/18/2025    CODE STATUS  Full Code    HPI & HOSPITAL COURSE    65-year-old female with alcohol cirrhosis, A-fib not on anticoagulation with history of GI bleed who presented 6/18/2025 with abdominal pain and dysuria.  She resides at a care facility.  Inconsistent history -initially reporting developed over the last days but sometimes she says it has been months.  She also had associated multiple nonspecific complaints including mild cough and abdominal pain.    At the ED, WBC count elevated at 14.2, platelet count 66, hemoglobin 8.8.  Creatinine 1.5, lactic acid WNL.  Urinalysis consistent with UTI.  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  CXR with new right pleural effusion associated with possible atelectasis versus infiltrate.  CT A/P with contrast showed the pleural effusion as well now with consolidation of the RLL.  There is gallbladder wall thickening versus pericholecystic fluid.  Demonstrated wall thickening in the urinary bladder may be seen with cystitis.  She was started on Unasyn and added Doxy given the pulmonary infiltrate.   Admitted for further management of infection (UTI, pneumonia).    Patient's symptoms improved while admitted and switched to IV ceftriaxone. HIDA scan was normal.  Blood culture 6/18/2025 remained negative.  Urine culture 6/18 was collected however the culture result was of doubtful clinical significance as per the culture result it had 3 or more organisms isolated and culture was of doubtful significance.  On 6/21, patient was hemodynamically stable overnight and on bedside exam was awake and alert, conversant, reports significant improvement of symptoms.  She states she feels overall well to go to  facility.  Medically cleared for discharge 6/21. She was given a dose of fosfomycin on 6/19. Pt given another dose of fosfomycin prior to discharge for the UTI. Pt to be discharged with Augmentin. Discussed with pharmacy.     Therefore, she is discharged in fair and stable condition to home with close outpatient follow-up.    The patient met 2-midnight criteria for an inpatient stay at the time of discharge.    Discharge Date  6/21/2025    FOLLOW UP ITEMS POST DISCHARGE  Follow up with PCP     DISCHARGE DIAGNOSES  Principal Problem:    UTI (urinary tract infection) (POA: Yes)  Active Problems:    Alcoholic cirrhosis (HCC) (POA: Yes)    Thrombocytopenia (HCC) (POA: Yes)    Community acquired pneumonia (POA: Yes)  Resolved Problems:    Hypomagnesemia (POA: Yes)    Thickening of wall of gallbladder (POA: Yes)      FOLLOW UP  No future appointments.  No follow-up provider specified.    MEDICATIONS ON DISCHARGE     Medication List        START taking these medications        Instructions   amoxicillin-clavulanate 875-125 MG Tabs  Start taking on: June 22, 2025  Commonly known as: Augmentin   Take 1 Tablet by mouth 2 times a day for 3 days.  Dose: 1 Tablet            CONTINUE taking these medications        Instructions   albuterol 108 (90 Base) MCG/ACT Aers inhalation aerosol   Inhale 2 Puffs every four hours as needed for Shortness of Breath.  Dose: 2 Puff     atorvastatin 20 MG Tabs  Commonly known as: Lipitor   Take 1 Tablet by mouth every evening.  Dose: 20 mg     bisacodyl 10 MG Supp  Commonly known as: Dulcolax   Insert 10 mg into the rectum as needed.  Dose: 10 mg     carvedilol 3.125 MG Tabs  Commonly known as: Coreg   Take 1 Tablet by mouth 2 times a day with meals.  Dose: 3.125 mg     ciprofloxacin 0.2-1 % Susp  Commonly known as: Cipro HC Otic   Administer 3 Drops into the right ear 2 times a day. PRESCRIPTION COURSE WAS COMPLETED  Dose: 3 Drop     furosemide 40 MG Tabs  Commonly known as: Lasix   Take 1  Tablet by mouth every day.  Dose: 40 mg     gabapentin 100 MG Caps  Commonly known as: Neurontin   Take 2 Capsules by mouth 2 times a day.  Dose: 200 mg     Glucagon Emergency 1 MG Kit   Inject 1 mg as directed one time as needed (hypoglycemia).  Dose: 1 mg     glucose 77.4 % Gel   Take 1 Tube by mouth one time as needed (hypoglycemia). For BS < 60  Dose: 1 Tube     lactulose 20 GM/30ML Soln   Take 30 mL by mouth 4 times a day.  Dose: 30 mL     magnesium hydroxide 400 MG/5ML Susp  Commonly known as: Milk Of Magnesia   Take 30 mL by mouth 1 time a day as needed.  Dose: 30 mL     oxyCODONE immediate-release 5 MG Tabs  Commonly known as: Roxicodone   Take 1 Tablet by mouth every 6 hours as needed for Severe Pain for up to 5 days.  Dose: 5 mg     pantoprazole 20 MG tablet  Commonly known as: Protonix   Take 20 mg by mouth every morning.  Dose: 20 mg     riFAXIMin 550 MG Tabs tablet  Commonly known as: Xifaxan   Take 1 Tablet by mouth 2 times a day.  Dose: 550 mg     spironolactone 50 MG Tabs  Commonly known as: Aldactone   Take 50 mg by mouth every morning.  Dose: 50 mg              Allergies  Allergies[1]    DIET  Orders Placed This Encounter   Procedures    Diet Order Diet: Consistent CHO (Diabetic)     Standing Status:   Standing     Number of Occurrences:   1     Diet::   Consistent CHO (Diabetic) [4]       ACTIVITY  As tolerated.  Weight bearing as tolerated    CONSULTATIONS  -    PROCEDURES  -    LABORATORY  Lab Results   Component Value Date    SODIUM 133 (L) 06/20/2025    POTASSIUM 4.2 06/20/2025    CHLORIDE 104 06/20/2025    CO2 20 06/20/2025    GLUCOSE 94 06/20/2025    BUN 32 (H) 06/20/2025    CREATININE 1.07 06/20/2025    CREATININE 0.9 03/12/2009        Lab Results   Component Value Date    WBC 8.1 06/20/2025    HEMOGLOBIN 8.3 (L) 06/20/2025    HEMATOCRIT 25.6 (L) 06/20/2025    PLATELETCT 49 (L) 06/20/2025        Total time of the discharge process exceeds 35 minutes.       [1]   Allergies  Allergen  Reactions    Meropenem Swelling     Pt had rxt to meropenem June 2024 (marked tongue swelling and right facial/periorbital edema)    Tuberculin Tests Unspecified     Per MAR from [facility]

## 2025-06-21 NOTE — PROGRESS NOTES
4 Eyes Skin Assessment Completed by DANYEL Tejada and DANYEL Nayak.    Skin assessment is primarily focused on high risk bony prominences. Pay special attention to skin beneath and around medical devices, high risk bony prominences, skin to skin areas and areas where the patient lacks sensation to feel pain and areas where the patient previously had breakdown.     Head (Occipital):  WDL   Ears (Under Medical Devices): WDL   Nose (Under Medical Devices): WDL   Mouth:  WDL   Neck: WDL   Breast/Chest:  WDL   Shoulder Blades:  WDL   Spine:   WDL   (R) Arm/Elbow/Hand: WDL   (L) Arm/Elbow/Hand: WDL   Abdomen: WDL   Pannus/Groin:  Red and Moisture   Sacrum/Coccyx:   Moisture fissure   (R) Ischial Tuberosity (Sit Bones):  WDL   (L) Ischial Tuberosity (Sit Bones):  WDL   (R) Leg:  Scab and Scar   (L) Leg:  Scar and old amputation   (R) Heel:  WDL   (R) Foot/Toe: WDL   (L) Heel: NA amputated   (L) Foot/Toe:  NA       DEVICES IN USE:   Respiratory Devices:  Nasal cannula  Feeding Devices:  N/A   Lines & BP Monitoring Devices:  Midline IV    Orthopedic Devices:  Wheelchair bound  Miscellaneous Devices:  Purewick    PROTOCOL INTERVENTIONS:   Low Airloss Bed:  Already in place  Offloading Dressing - Heel:  Already in place  Float Heels with Pillows:  Already in place  Q2 Turns with Wedges:  Already in place  Barrier Paste:  Already in place  Nasal Cannula with Gray Foams:  Already in place    WOUND PHOTOS:        WOUND CONSULT:

## 2025-06-21 NOTE — DISCHARGE PLANNING
DC Transport Scheduled    Transport Company Scheduled:  [facility]    Scheduled Date: 6/21/2025  Scheduled Time: 1230    Transport Type: Wheelchair  Destination:   Healthcare Facility   Destination Name: [facility]  Destination address: [address] [place] [id number]    Notified care team of scheduled transport via [employer].     If there are any changes needed to the DC transportation scheduled, please contact [facility] at ext. [phone] between the hours of 1115-3734. If outside those hours, contact the ED Case Manager at ext. [phone].

## 2025-06-21 NOTE — DISCHARGE SUMMARY
Discharge Summary    CHIEF COMPLAINT ON ADMISSION  Chief Complaint   Patient presents with    Painful Urination     Pt BIBA from [place]. Pt complaint of painful urination x3 days.        Reason for Admission  UTI (urinary tract infection)     Admission Date  6/18/2025    CODE STATUS  Full Code    HPI & HOSPITAL COURSE    65-year-old female with alcohol cirrhosis, A-fib not on anticoagulation with history of GI bleed who presented 6/18/2025 with abdominal pain and dysuria.  She resides at a care facility.  Inconsistent history -initially reporting developed over the last days but sometimes she says it has been months.  She also had associated multiple nonspecific complaints including mild cough and abdominal pain.    At the ED, WBC count elevated at 14.2, platelet count 66, hemoglobin 8.8.  Creatinine 1.5, lactic acid WNL.  Urinalysis consistent with UTI.  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  CXR with new right pleural effusion associated with possible atelectasis versus infiltrate.  CT A/P with contrast showed the pleural effusion as well now with consolidation of the RLL.  There is gallbladder wall thickening versus pericholecystic fluid.  Demonstrated wall thickening in the urinary bladder may be seen with cystitis.  She was started on Unasyn and added Doxy given the pulmonary infiltrate.   Admitted for further management of infection (UTI, pneumonia).    Patient's symptoms improved while admitted and switched to IV ceftriaxone. HIDA scan was normal.  Blood culture 6/18/2025 remained negative.  Urine culture 6/18 was collected however the culture result was of doubtful clinical significance as per the culture result it had 3 or more organisms isolated and culture was of doubtful significance.  On 6/21, patient was hemodynamically stable overnight and on bedside exam was awake and alert, conversant, reports significant improvement of symptoms.  She states she feels overall well to go to  facility.  Medically cleared for discharge 6/21. She was given a dose of fosfomycin on 6/19. Pt given another dose of fosfomycin prior to discharge for the UTI. Pt to be discharged with Augmentin. Discussed with pharmacy.     Therefore, she is discharged in fair and stable condition to skilled nursing facility.    The patient met 2-midnight criteria for an inpatient stay at the time of discharge.    Discharge Date  6/21    FOLLOW UP ITEMS POST DISCHARGE  Follow up with PCP    DISCHARGE DIAGNOSES  Principal Problem:    UTI (urinary tract infection) (POA: Yes)  Active Problems:    Alcoholic cirrhosis (HCC) (POA: Yes)    Thrombocytopenia (HCC) (POA: Yes)    Community acquired pneumonia (POA: Yes)  Resolved Problems:    Hypomagnesemia (POA: Yes)    Thickening of wall of gallbladder (POA: Yes)      FOLLOW UP  No future appointments.  No follow-up provider specified.    MEDICATIONS ON DISCHARGE     Medication List        START taking these medications        Instructions   amoxicillin-clavulanate 875-125 MG Tabs  Start taking on: June 22, 2025  Commonly known as: Augmentin   Take 1 Tablet by mouth 2 times a day for 3 days.  Dose: 1 Tablet            CONTINUE taking these medications        Instructions   albuterol 108 (90 Base) MCG/ACT Aers inhalation aerosol   Inhale 2 Puffs every four hours as needed for Shortness of Breath.  Dose: 2 Puff     atorvastatin 20 MG Tabs  Commonly known as: Lipitor   Take 1 Tablet by mouth every evening.  Dose: 20 mg     bisacodyl 10 MG Supp  Commonly known as: Dulcolax   Insert 10 mg into the rectum as needed.  Dose: 10 mg     carvedilol 3.125 MG Tabs  Commonly known as: Coreg   Take 1 Tablet by mouth 2 times a day with meals.  Dose: 3.125 mg     ciprofloxacin 0.2-1 % Susp  Commonly known as: Cipro HC Otic   Administer 3 Drops into the right ear 2 times a day. PRESCRIPTION COURSE WAS COMPLETED  Dose: 3 Drop     furosemide 40 MG Tabs  Commonly known as: Lasix   Take 1 Tablet by mouth every  day.  Dose: 40 mg     gabapentin 100 MG Caps  Commonly known as: Neurontin   Take 2 Capsules by mouth 2 times a day.  Dose: 200 mg     Glucagon Emergency 1 MG Kit   Inject 1 mg as directed one time as needed (hypoglycemia).  Dose: 1 mg     glucose 77.4 % Gel   Take 1 Tube by mouth one time as needed (hypoglycemia). For BS < 60  Dose: 1 Tube     lactulose 20 GM/30ML Soln   Take 30 mL by mouth 4 times a day.  Dose: 30 mL     magnesium hydroxide 400 MG/5ML Susp  Commonly known as: Milk Of Magnesia   Take 30 mL by mouth 1 time a day as needed.  Dose: 30 mL     oxyCODONE immediate-release 5 MG Tabs  Commonly known as: Roxicodone   Take 1 Tablet by mouth every 6 hours as needed for Severe Pain for up to 5 days.  Dose: 5 mg     pantoprazole 20 MG tablet  Commonly known as: Protonix   Take 20 mg by mouth every morning.  Dose: 20 mg     riFAXIMin 550 MG Tabs tablet  Commonly known as: Xifaxan   Take 1 Tablet by mouth 2 times a day.  Dose: 550 mg     spironolactone 50 MG Tabs  Commonly known as: Aldactone   Take 50 mg by mouth every morning.  Dose: 50 mg              Allergies  Allergies[1]    DIET  Orders Placed This Encounter   Procedures    Diet Order Diet: Consistent CHO (Diabetic)     Standing Status:   Standing     Number of Occurrences:   1     Diet::   Consistent CHO (Diabetic) [4]       ACTIVITY  As tolerated.  Weight bearing as tolerated    CONSULTATIONS  -    PROCEDURES  -    LABORATORY  Lab Results   Component Value Date    SODIUM 133 (L) 06/20/2025    POTASSIUM 4.2 06/20/2025    CHLORIDE 104 06/20/2025    CO2 20 06/20/2025    GLUCOSE 94 06/20/2025    BUN 32 (H) 06/20/2025    CREATININE 1.07 06/20/2025    CREATININE 0.9 03/12/2009        Lab Results   Component Value Date    WBC 8.1 06/20/2025    HEMOGLOBIN 8.3 (L) 06/20/2025    HEMATOCRIT 25.6 (L) 06/20/2025    PLATELETCT 49 (L) 06/20/2025        Total time of the discharge process exceeds 35 minutes.         [1]   Allergies  Allergen Reactions    Meropenem  Swelling     Pt had rxt to meropenem June 2024 (marked tongue swelling and right facial/periorbital edema)    Tuberculin Tests Unspecified     Per MAR from [facility]

## 2025-06-21 NOTE — DISCHARGE PLANNING
Case Management Discharge Planning        DME Needed: No    Action(s) Taken:     Chart review completed.     Pt medically clear for discharge back to [facility].    0954: DANYEL KNOX spoke with Rochelle at [facility]. They can accept pt back today at 1230.     0959: RN CM updated bedside RN, charge RN, and MD.    1013: Todd request placed.     1019: Todd has confirmed transportation setup for 1230 today via [employer].     1118: RN CM got pt signature for cobra/transfer packet and delivered IMM to patient. Pt declined having RN CM call daughter about discharge time. Pt stated she already told her.     1158: RN CM delivered cobra/transfer packet to charge desk. RN CM verbally told bedside RN that packet was at charge desk.     Medically Clear: Yes

## 2025-06-21 NOTE — CARE PLAN
The patient is Stable - Low risk of patient condition declining or worsening    Shift Goals  Clinical Goals: free from fall, maintain skin integrity  Patient Goals: sleep  Family Goals: EDMUND    Progress made toward(s) clinical / shift goals:  pt educated to using call bell and and asking for assistance, pt frame alarm on at all times, q2 turns done and linen changes during incontinence  Problem: Pain - Standard  Goal: Alleviation of pain or a reduction in pain to the patient’s comfort goal  Outcome: Progressing     Problem: Skin Integrity  Goal: Skin integrity is maintained or improved  Outcome: Progressing     Problem: Fall Risk  Goal: Patient will remain free from falls  Outcome: Progressing       Patient is not progressing towards the following goals:

## 2025-06-21 NOTE — CARE PLAN
The patient is Stable - Low risk of patient condition declining or worsening    Shift Goals  Clinical Goals: go home  Patient Goals: go home  Family Goals: EDMUND    Progress made toward(s) clinical / shift goals:    Problem: Pain - Standard  Goal: Alleviation of pain or a reduction in pain to the patient’s comfort goal  Outcome: Met     Problem: Knowledge Deficit - Standard  Goal: Patient and family/care givers will demonstrate understanding of plan of care, disease process/condition, diagnostic tests and medications  Outcome: Met     Problem: Skin Integrity  Goal: Skin integrity is maintained or improved  Outcome: Met     Problem: Fall Risk  Goal: Patient will remain free from falls  Outcome: Met       :

## 2025-06-21 NOTE — PROGRESS NOTES
Received report and assumed care of patient at change of shift. Patient is A&Ox4, on 2 L O2, and reports no pain at this time. Patient assessment completed, bed in lowest position, and call light and personal belongings are within reach. Patient expressed no further needs at this time.

## 2025-07-01 NOTE — ANESTHESIA TIME REPORT
Anesthesia Start and Stop Event Times     Date Time Event    12/28/2022 1240 Ready for Procedure     1309 Anesthesia Start     1549 Anesthesia Stop        Responsible Staff  12/28/22    Name Role Begin End    Cade Orellana M.D. Anesth 1309 1542        Overtime Reason:  no overtime (within assigned shift)    Comments:                                                       soft, nontender, nondistended, no guarding or rigidity
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
## 2025-07-08 ENCOUNTER — TELEPHONE (OUTPATIENT)
Dept: HEALTH INFORMATION MANAGEMENT | Facility: OTHER | Age: 65
End: 2025-07-08
Payer: MEDICARE

## 2025-07-14 NOTE — PROGRESS NOTES
[facility] -  PROGRESS NOTE               Author: Scott Roper D.O. Date & Time: 5/21/2024  10:19 AM     HPI:  64yoF with PMH significant for HTN, DM II, alcoholic cirrhosis with ascites, atrial fibrillation on anticoagulation, history of left-sided BKA, recent hospitalization for decompensated cirrhosis, admitted with complaints of fevers chills and SOB x 2 days and now with ELIZABETH and electrolyte abnormalities.  On admission patient was found to have sepsis thought to be secondary to hospital-acquired pneumonia and UTI and so she was started on broad-spectrum antibiotics with linezolid and Zosyn.  She also has bacteremia with Streptococcus pneumoniae and urine culture with MSSA.  She did undergo a CT abdomen/pelvis with IV contrast 5/16/2024 that showed new left lower lobe lung consolidation and small left pleural effusion likely due to pneumonia, cirrhosis and portal hypertension and splenomegaly, and ascites, and possible colitis of the cecum.  On admission her creatinine was 1.4 and prior to that during her recent hospital admission for decompensated cirrhosis earlier this month her creatinine was ~0.7 (was 0.7 on 5/14/2024 on discharge).  Her creatinine increased to 1.67 on 5/17/2024 and then yesterday it increased to 2.28 and today it is higher at 3.03.  She did receive an NS bolus yesterday.  Her sodium this morning is low at 122 and her potassium is elevated at 5.5 and she does have metabolic acidosis.  Unclear if her UOP is being documented accurately but 310 cc over the past 24 hours is recorded.  She denies any F/C/N/V/CP/SOB, + LE edema, + abdominal pain, + loose stools, denies any HA/vision changes.    DAILY NEPHROLOGY SUMMARY:  5/20 - No acute events overnight, vitals stable, remains oliguric w/  in the past 24 hours. Overall feels improved compared to yesterday. Slow speech.  5/21: No acute events overnight, vital signs stable SBP 100s-110s, on RA.  Improvement in UOP  "with 1275 cc x 24 hrs.  Significant improvement in labs with  (121), bicarb 19 (16), without AG, creatinine 3.32 (3.55).    REVIEW OF SYSTEMS:    10 point ROS reviewed and is as per HPI/daily summary or otherwise negative    PMH/PSH/SH/FH: Reviewed and unchanged since admission note  CURRENT MEDICATIONS: Reviewed from admission to present day    VS:  /74   Pulse 76   Temp 36.5 °C (97.7 °F) (Temporal)   Resp 18   Ht 1.702 m (5' 7\")   Wt 114 kg (250 lb 10.6 oz)   LMP 06/02/2008   SpO2 94%   BMI 39.26 kg/m²   Physical Exam  Constitutional:       General: She is not in acute distress.  HENT:      Head: Normocephalic and atraumatic.      Nose: Nose normal.      Mouth/Throat:      Mouth: Mucous membranes are moist.   Eyes:      Extraocular Movements: Extraocular movements intact.      Conjunctiva/sclera: Conjunctivae normal.      Pupils: Pupils are equal, round, and reactive to light.   Cardiovascular:      Rate and Rhythm: Normal rate and regular rhythm.      Heart sounds: No murmur heard.     No friction rub. No gallop.   Pulmonary:      Effort: Pulmonary effort is normal.      Breath sounds: Normal breath sounds. No wheezing, rhonchi or rales.   Abdominal:      General: There is no distension.      Palpations: Abdomen is soft.      Tenderness: There is abdominal tenderness (mild, lower abdomen). There is no guarding or rebound.   Musculoskeletal:      Right lower leg: Edema (trace) present.      Comments: S/p L BKA   Skin:     General: Skin is warm.   Neurological:      Mental Status: She is alert.      Comments: Slow speech   Psychiatric:         Mood and Affect: Mood normal.         Behavior: Behavior normal.         Fluids:  In: 870 [P.O.:870]  Out: 975     LABS:  Recent Labs     05/19/24  1602 05/20/24  0027 05/21/24  0350   SODIUM 121* 121* 127*   POTASSIUM 5.1 4.8 4.4   CHLORIDE 93* 92* 96   CO2 15* 16* 19*   GLUCOSE 131* 140* 163*   BUN 47* 50* 53*   CREATININE 3.34* 3.55* 3.32*   CALCIUM " 6.3* 6.4* 6.7*       IMPRESSION:  # ELIZABETH--creatinine 0.7 on 5/14/2024 during her previous visit  -ELIZABETH multifactorial likely related to LORETTA and some relative hypotension and possible prerenal etiology  -Low concern for HRS at this time  -Improvement in UOP with supportive measures  #Hyponatremia--improving  -Diuretics now held, patient also had been on spironolactone  -Had several loose bowel movements related to lactulose  -Urine osmolality elevated 332 and urine Na low  #Hyperkalemia--2/2 ELIZABETH, resolved  -Treated medically  #Metabolic acidosis--resoled  -Continuing p.o. bicarb supplements  -Provided 1 amp of bicarb 5/20  #Leukocytosis--present on admission and now resolved  -On antibiotics for Streptococcus bacteremia, hospital-acquired pneumonia, and MSSA UTI  #Anemia--iron sat low  #Thrombocytopenia--likely secondary to liver disease, monitor  #Hypocalcemia--PTH elevated and Vit D low  -Repleting appropriately w/ PO/IV supplementation  #Alcoholic cirrhosis--with ascites  -Diuretics currently held  #Hyperphosphatemia--secondary to ELIZABETH  #Hypomagnesemia--Likely in the setting of loose BM's, resolved    PLAN:  -No acute need for RRT but will monitor closely  -This patient is a poor long-term dialysis candidate given her liver disease and other comorbidities  -Continue oral fluid restriction less than 1.2 L/day  -Hold diueretics  -Continue Lokelma until goal of K <4  -Continue p.o. bicarbonate supplements 1950 mg 3 times daily, goal bicarbonate of >22  -Continue PhosLo 667 mg 3 times daily with meals  -Providing IV calcium gluconate 1 g, oral Os-Geovani 500 mg twice daily  -Added vitamin D 3 1000 IUs daily  -Antibiotics per primary service  -No IV iron given active infection, ok to give PO iron supplements  -Avoid further IV contrast, nephrotoxins, NSAIDs  -Dose all meds for decreased GFR  -Other management per primary service   No

## 2025-08-08 PROBLEM — E87.29 HIGH ANION GAP METABOLIC ACIDOSIS: Status: ACTIVE | Noted: 2025-01-01

## 2025-08-08 PROBLEM — E16.2 HYPOGLYCEMIA: Status: ACTIVE | Noted: 2025-01-01

## 2025-08-08 PROBLEM — G93.40 ENCEPHALOPATHY ACUTE: Status: ACTIVE | Noted: 2025-01-01

## 2025-08-08 PROBLEM — Z98.890 REQUIRED EMERGENT INTUBATION: Status: ACTIVE | Noted: 2025-01-01

## 2025-08-08 PROBLEM — Z89.512 HX OF BKA, LEFT (HCC): Status: ACTIVE | Noted: 2025-01-01

## 2025-08-08 PROBLEM — N18.9 ACUTE-ON-CHRONIC KIDNEY INJURY (HCC): Status: ACTIVE | Noted: 2024-06-03

## 2025-08-08 PROBLEM — R74.8 ABNORMAL LIVER ENZYMES: Status: ACTIVE | Noted: 2025-01-01

## 2025-08-08 PROBLEM — R57.9 SHOCK (HCC): Status: ACTIVE | Noted: 2023-10-30

## 2025-08-09 PROBLEM — R65.21 SEPTIC SHOCK (HCC): Status: ACTIVE | Noted: 2025-01-01

## 2025-08-09 PROBLEM — Z71.89 GOALS OF CARE, COUNSELING/DISCUSSION: Status: ACTIVE | Noted: 2025-01-01

## 2025-08-09 PROBLEM — R93.2 ABNORMAL GALL BLADDER DIAGNOSTIC IMAGING: Status: ACTIVE | Noted: 2025-01-01

## 2025-08-09 PROBLEM — J96.01 ACUTE HYPOXIC RESPIRATORY FAILURE (HCC): Status: ACTIVE | Noted: 2025-01-01

## 2025-08-10 PROBLEM — E87.29 HIGH ANION GAP METABOLIC ACIDOSIS: Status: RESOLVED | Noted: 2025-01-01 | Resolved: 2025-01-01

## 2025-08-11 PROBLEM — R84.5 SPUTUM CULTURE POSITIVE FOR ESBL E. COLI: Status: ACTIVE | Noted: 2025-01-01

## 2025-08-11 PROBLEM — E16.2 HYPOGLYCEMIA: Status: RESOLVED | Noted: 2025-01-01 | Resolved: 2025-01-01

## 2025-08-22 PROBLEM — E87.5 HYPERKALEMIA: Status: RESOLVED | Noted: 2024-06-07 | Resolved: 2025-01-01

## (undated) DEVICE — SUTURE 3-0 MONOCRYL PLUS PS-1 - 27 INCH (36/BX)

## (undated) DEVICE — SET LEADWIRE 5 LEAD BEDSIDE DISPOSABLE ECG (1SET OF 5/EA)

## (undated) DEVICE — BLADE SAGITTAL 6 SYSTEM 25MM

## (undated) DEVICE — DRAPE IOBAN II INCISE 23X17 - (10EA/BX 4BX/CA)

## (undated) DEVICE — SET CONTINU-FLO SOLN 3 - (48/CA)

## (undated) DEVICE — GLOVE SZ 6.5 BIOGEL PI MICRO - PF LF (50PR/BX)

## (undated) DEVICE — Device

## (undated) DEVICE — SODIUM CHL IRRIGATION 0.9% 1000ML (12EA/CA)

## (undated) DEVICE — TUBING O2 7FT CLEAR STANDARD CONNECTOR - (50/CA)

## (undated) DEVICE — LACTATED RINGERS INJ 1000 ML - (14EA/CA 60CA/PF)

## (undated) DEVICE — SUTURE 2-0 PDSII VIOLET SH 18 IN (12EA/BX)

## (undated) DEVICE — CANISTER SUCTION RIGID RED 1500CC (40EA/CA)

## (undated) DEVICE — SLEEVE VASO CALF MED - (10PR/CA)

## (undated) DEVICE — IMMOBILIZER KNEE 20 INCH - (1/EA)

## (undated) DEVICE — BANDAGE ELASTIC 6 HONEYCOMB - 6X5YD LF (20/CA)"

## (undated) DEVICE — HUMID-VENT HEAT AND MOISTURE EXCHANGE- (50/BX)

## (undated) DEVICE — TUBE CONNECT SUCTION CLEAR 120 X 1/4" (50EA/CA)"

## (undated) DEVICE — PINNING SYSTEM

## (undated) DEVICE — GOWN WARMING STANDARD FLEX - (30/CA)

## (undated) DEVICE — GLOVE BIOGEL PI ORTHO SZ 7.5 PF LF (40PR/BX)

## (undated) DEVICE — HEAD HOLDER JUNIOR/ADULT

## (undated) DEVICE — SUTURE 2-0 VICRYL PLUS CT-1 36 (36PK/BX)"

## (undated) DEVICE — BRUSH FMCNL TIP IRR STRL - (12/PKG) FOR SURGILAV

## (undated) DEVICE — PACK TOTAL KNEE  (1/CA)

## (undated) DEVICE — PENCIL ELECTSURG 10FT BTN SWH - (50/CA)

## (undated) DEVICE — HANDPIECE 10FT INTPLS SCT PLS IRRIGATION HAND CONTROL SET (6/PK)

## (undated) DEVICE — TUBING CLEARLINK DUO-VENT - C-FLO (48EA/CA)

## (undated) DEVICE — SUTURE  0 ETHIBOND CT-1 30 IN (36PK/BX)

## (undated) DEVICE — SUTURE GENERAL

## (undated) DEVICE — WATER IRRIGATION STERILE 1000ML (12EA/CA)

## (undated) DEVICE — TOWELS CLOTH SURGICAL - (4/PK 20PK/CA)

## (undated) DEVICE — STAPLER SKIN DISP - (6/BX 10BX/CA) VISISTAT

## (undated) DEVICE — COVER LIGHT HANDLE FLEXIBLE - SOFT (2EA/PK 80PK/CA)

## (undated) DEVICE — GLOVE SZ 7.5 BIOGEL PI MICRO - PF LF (50PR/BX)

## (undated) DEVICE — PAD LAP STERILE 18 X 18 - (5/PK 40PK/CA)

## (undated) DEVICE — SUTURE 1 PDS-2 PLUS CTX - (24/BX)

## (undated) DEVICE — BLOCK BITE MAXI DENTAL RETENTION RIM (100EA/BX)

## (undated) DEVICE — BOVIE BLADE COATED - (50/PK)

## (undated) DEVICE — NEPTUNE 4 PORT MANIFOLD - (20/PK)

## (undated) DEVICE — GLOVE BIOGEL PI INDICATOR SZ 7.0 SURGICAL PF LF - (50/BX 4BX/CA)

## (undated) DEVICE — DRAPE SURG STERI-DRAPE 7X11OD - (40EA/CA)

## (undated) DEVICE — ELECTRODE DUAL RETURN W/ CORD - (50/PK)

## (undated) DEVICE — COVER MAYO STAND X-LG - (22EA/CA)

## (undated) DEVICE — SUTURE 3-0 MONOCRYL SH (36PK/BX)

## (undated) DEVICE — SUTURE 1 VICRYL PLUS CTX - 8 X 18 INCH (12/BX)

## (undated) DEVICE — TRAY SKIN SCRUB PVP WET (20EA/CA) PART #DYND70356 DISCONTINUED

## (undated) DEVICE — TOWEL STOP TIMEOUT SAFETY FLAG (40EA/CA)

## (undated) DEVICE — BAG SPONGE COUNT 10.25 X 32 - BLUE (250/CA)

## (undated) DEVICE — SUTURE 0 SILK TIES (36PK/BX)

## (undated) DEVICE — DRAPE LARGE 3 QUARTER - (20/CA)

## (undated) DEVICE — KIT EVACUATER 3 SPRING PVC LF 1/8 DRAIN SIZE (10EA/CA)"

## (undated) DEVICE — TUBE E-T HI-LO CUFF 7.0MM (10EA/PK)

## (undated) DEVICE — LENS/HOOD FOR SPACESUIT - (32/PK) PEEL AWAY FACE

## (undated) DEVICE — TIP INTPLS HFLO ML ORFC BTRY - (12/CS)  FOR SURGILAV

## (undated) DEVICE — DRESSING POST OP BORDER 4 X 10 (5EA/BX)

## (undated) DEVICE — GLOVE SZ 7 BIOGEL PI MICRO - PF LF (50PR/BX 4BX/CA)

## (undated) DEVICE — SET EXTENSION WITH 2 PORTS (48EA/CA) ***PART #2C8610 IS A SUBSTITUTE*****

## (undated) DEVICE — TRAY SRGPRP PVP IOD WT PRP - (20/CA)

## (undated) DEVICE — SENSOR OXIMETER ADULT SPO2 RD SET (20EA/BX)

## (undated) DEVICE — ELECTRODE 850 FOAM ADHESIVE - HYDROGEL RADIOTRNSPRNT (50/PK)

## (undated) DEVICE — SODIUM CHL. IRRIGATION 0.9% 3000ML (4EA/CA 65CA/PF)

## (undated) DEVICE — TUBE CONNECTING SUCTION - CLEAR PLASTIC STERILE 72 IN (50EA/CA)

## (undated) DEVICE — SUTURE 3-0 ETHILON FSLX 30 (36PK/BX)"

## (undated) DEVICE — BLADE SAW RECIPROCATING DOUBLE SIDED 70MM X 12.5MM X 0.64MM STERILE (1EA)

## (undated) DEVICE — MIXER BONE CEMENT REVOLUTION - W/FEMORAL PRESSURIZER (6/CA)

## (undated) DEVICE — SUCTION INSTRUMENT YANKAUER BULBOUS TIP W/O VENT (50EA/CA)

## (undated) DEVICE — SUTURE 0 VICRYL PLUS CT-1 - 36 INCH (36/BX)

## (undated) DEVICE — GOWN SURGEONS X-LARGE - DISP. (30/CA)

## (undated) DEVICE — DRAPE SURGICAL U 77X120 - (10/CA)

## (undated) DEVICE — STOCKINETTE IMPERVIOUS 12X48 - STERILELF (10/CA)"

## (undated) DEVICE — SUTURE 2-0 MONOCRYL PLUS UNDYED CT-1 1 X 36 (36EA/BX)"

## (undated) DEVICE — SUTURE 2-0 SILK 12 X 18" (36PK/BX)"

## (undated) DEVICE — SYSTEM PREVENA INCISION MNGM - (1/EA)

## (undated) DEVICE — WRAP COBAN SELF-ADHERENT 6 IN X  5YDS STERILE TAN (12/CA)

## (undated) DEVICE — GLOVE BIOGEL SZ 8 SURGICAL PF LTX - (50PR/BX 4BX/CA)

## (undated) DEVICE — KIT ANESTHESIA W/CIRCUIT & 3/LT BAG W/FILTER (20EA/CA)

## (undated) DEVICE — GLOVE BIOGEL SZ 7 SURGICAL PF LTX - (50PR/BX 4BX/CA)

## (undated) DEVICE — GLOVE BIOGEL INDICATOR SZ 8 SURGICAL PF LTX - (50/BX 4BX/CA)

## (undated) DEVICE — GLOVE BIOGEL INDICATOR SZ 7.5 SURGICAL PF LTX - (50PR/BX 4BX/CA)

## (undated) DEVICE — MASK ANESTHESIA ADULT  - (100/CA)

## (undated) DEVICE — SENSOR SPO2 NEO LNCS ADHESIVE (20/BX) SEE USER NOTES

## (undated) DEVICE — CHLORAPREP 26 ML APPLICATOR - ORANGE TINT(25/CA)

## (undated) DEVICE — TUBE, CULTURE AEROBIC

## (undated) DEVICE — PACK LOWER EXTREMITY - (2/CA)

## (undated) DEVICE — BONE WAX (12PK/BX)

## (undated) DEVICE — DRESSING TEGADERM 8 X 12 - (10/BX 8BX/CA)

## (undated) DEVICE — PADDING CAST 6 IN X 4 YDS - SOF-ROLL (6RL/BG 6BG/CA)

## (undated) DEVICE — CANISTER SUCTION 3000ML MECHANICAL FILTER AUTO SHUTOFF MEDI-VAC NONSTERILE LF DISP  (40EA/CA)

## (undated) DEVICE — CONTAINER SPECIMEN BAG OR - STERILE 4 OZ W/LID (100EA/CA)

## (undated) DEVICE — CATHETER TROCAR 32FR - (10/CA)

## (undated) DEVICE — BLADE 90X18X1.27MM SAW SAGITTAL

## (undated) DEVICE — SUTURE 1 VICRYL PLUSCT-1 27IN - (36/BX)

## (undated) DEVICE — BUTTON ENDOSCOPY DISPOSABLE

## (undated) DEVICE — SWAB ANAEROBIC SPEC.COLLECTOR - (25/PK 4PK/CA 100EA/CA)

## (undated) DEVICE — GLOVE BIOGEL PI ORTHO SZ 7 PF LF (40PR/BX)

## (undated) DEVICE — MASK PANORAMIC OXYGEN PRO2 (30EA/CA)

## (undated) DEVICE — GLOVE, LITE (PAIR)

## (undated) DEVICE — SUTURE 0 PDS-2 CTX 36 INCH - (24/BX)

## (undated) DEVICE — CUP DENTURE W/ LID - (200/CA)

## (undated) DEVICE — FILM CASSETTE ENDO

## (undated) DEVICE — PORT AUXILLARY WATER (50EA/BX)

## (undated) DEVICE — SLEEVE, VASO, THIGH, MED

## (undated) DEVICE — DRESSING AQUACEL AG ADVANTAGE 3.5 X 10" (10EA/BX)"

## (undated) DEVICE — KIT CUSTOM PROCEDURE SINGLE FOR ENDO (15/CA)

## (undated) DEVICE — GLOVE BIOGEL INDICATOR SZ 7SURGICAL PF LTX - (50/BX 4BX/CA)

## (undated) DEVICE — MASK OXYGEN VNYL ADLT MED CONC WITH 7 FOOT TUBING - (50EA/CA)

## (undated) DEVICE — STOCKINET TUBULAR 6IN STERILE - 6 X 48YDS (25/CA)

## (undated) DEVICE — SUTURE ETHILON 2-0 FSLX 30 (36PK/BX)"

## (undated) DEVICE — TOURNIQUET CUFF 24 X 4 ONE PORT - STERILE (10/BX)

## (undated) DEVICE — SUTURE 3-0 ETHILON FS-1 - (36/BX) 30 INCH

## (undated) DEVICE — NEEDLE W/FACET S TIP ECHOGENIC W/STIMULATION CABLE SONOPLEX II 21G X 4IN (10EA/BX)

## (undated) DEVICE — KIT PROCEDURE DOUBLE ENDO ONLY (5/CA)

## (undated) DEVICE — PROTECTOR ULNA NERVE - (36PR/CA)

## (undated) DEVICE — BLADE SAGITTAL SAW DUAL CUT 75.0 X 25.0MM (1/EA)

## (undated) DEVICE — BANDAGE ELASTIC STERILE VELCRO 6 X 5 YDS (25EA/CA)

## (undated) DEVICE — SUTURE2-0 20CM STRATAFIX SPIRAL PDS CT-1 (12EA/BX)

## (undated) DEVICE — BLADE SURGICAL #10 - (50/BX)

## (undated) DEVICE — DRESSING ABDOMINAL PAD STERILE 8 X 10" (360EA/CA)"

## (undated) DEVICE — COVER LIGHT HANDLE ALC PLUS DISP (18EA/BX)

## (undated) DEVICE — GLOVE BIOGEL SZ 7.5 SURGICAL PF LTX - (50PR/BX 4BX/CA)

## (undated) DEVICE — DRESSING XEROFORM 1X8 - (50/BX 4BX/CA)

## (undated) DEVICE — GLOVESZ 8.5 BIOGEL PI MICRO - PF LF (50PR/BX)

## (undated) DEVICE — SUTURE ABSORBABLE VIOLET PDS 2-0 CT-1 L18 IN (12EA/BX)

## (undated) DEVICE — PIN TROCAR GNS 1/8INX5IN (1EA)

## (undated) DEVICE — WATER IRRIG. STER. 1500 ML - (9/CA)

## (undated) DEVICE — GLOVE BIOGEL PI INDICATOR SZ 7.5 SURGICAL PF LF -(50/BX 4BX/CA)

## (undated) DEVICE — DRAPE U ORTHOPEDIC - (10/BX)

## (undated) DEVICE — COTTON ROLL 1 POUND BIOSEAL - (5RL/CA)

## (undated) DEVICE — KIT  I.V. START (100EA/CA)

## (undated) DEVICE — DRESSING PETROLEUM GAUZE 5 X 9" (50EA/BX 4BX/CA)"

## (undated) DEVICE — KIT ROOM DECONTAMINATION

## (undated) DEVICE — PIN TROCAR GEN 1/8X3 (4EA/BX)

## (undated) DEVICE — SUTURE 2-0 VICRYL PLUS CT-1 - 8 X 18 INCH(12/BX)

## (undated) DEVICE — SPONGE GAUZESTER 4 X 4 4PLY - (128PK/CA)

## (undated) DEVICE — PADDING CAST 6 IN STERILE - 6 X 4 YDS (24/CA)

## (undated) DEVICE — TUBE E-T HI-LO CUFF 6.5MM (10EA/BX)

## (undated) DEVICE — SUTURE 1 VICRYL PLUS CTX - 36 INCH (36/BX)

## (undated) DEVICE — SUTURE 1 ETHIBOND CTX C/R 8-1 - (12/BX)

## (undated) DEVICE — DRAPE LAPAROTOMY T SHEET - (12EA/CA)

## (undated) DEVICE — CLOSURE PRINEO SKIN - (2EA/BX)

## (undated) DEVICE — PACK MINOR BASIN - (2EA/CA)